# Patient Record
Sex: MALE | Race: WHITE | NOT HISPANIC OR LATINO | Employment: OTHER | URBAN - NONMETROPOLITAN AREA
[De-identification: names, ages, dates, MRNs, and addresses within clinical notes are randomized per-mention and may not be internally consistent; named-entity substitution may affect disease eponyms.]

---

## 2018-01-30 ENCOUNTER — OFFICE VISIT CONVERTED (OUTPATIENT)
Dept: FAMILY MEDICINE CLINIC | Age: 53
End: 2018-01-30
Attending: FAMILY MEDICINE

## 2018-02-28 ENCOUNTER — OFFICE VISIT CONVERTED (OUTPATIENT)
Dept: OTOLARYNGOLOGY | Facility: CLINIC | Age: 53
End: 2018-02-28
Attending: OTOLARYNGOLOGY

## 2018-04-27 ENCOUNTER — OFFICE VISIT CONVERTED (OUTPATIENT)
Dept: FAMILY MEDICINE CLINIC | Age: 53
End: 2018-04-27
Attending: FAMILY MEDICINE

## 2018-07-06 ENCOUNTER — OFFICE VISIT CONVERTED (OUTPATIENT)
Dept: FAMILY MEDICINE CLINIC | Age: 53
End: 2018-07-06
Attending: FAMILY MEDICINE

## 2018-10-02 ENCOUNTER — OFFICE VISIT CONVERTED (OUTPATIENT)
Dept: FAMILY MEDICINE CLINIC | Age: 53
End: 2018-10-02
Attending: FAMILY MEDICINE

## 2018-10-08 ENCOUNTER — OFFICE VISIT CONVERTED (OUTPATIENT)
Dept: FAMILY MEDICINE CLINIC | Age: 53
End: 2018-10-08
Attending: FAMILY MEDICINE

## 2018-10-29 ENCOUNTER — OFFICE VISIT CONVERTED (OUTPATIENT)
Dept: FAMILY MEDICINE CLINIC | Age: 53
End: 2018-10-29
Attending: NURSE PRACTITIONER

## 2018-12-31 ENCOUNTER — OFFICE VISIT CONVERTED (OUTPATIENT)
Dept: FAMILY MEDICINE CLINIC | Age: 53
End: 2018-12-31
Attending: NURSE PRACTITIONER

## 2019-01-09 ENCOUNTER — OFFICE VISIT CONVERTED (OUTPATIENT)
Dept: FAMILY MEDICINE CLINIC | Age: 54
End: 2019-01-09
Attending: FAMILY MEDICINE

## 2019-01-09 ENCOUNTER — HOSPITAL ENCOUNTER (OUTPATIENT)
Dept: OTHER | Facility: HOSPITAL | Age: 54
Discharge: HOME | End: 2019-01-09
Attending: FAMILY MEDICINE

## 2019-01-14 LAB
BACTERIA SPEC AEROBE CULT: ABNORMAL
CONV CEFTAZIDIME (BP): 0.25
CONV CEFTRIAXONE (BP): 2
CONV LEVOFLOXACIN (BP): 4
CONV MINOCYCLINE (BP): 3
CONV TICARCILLIN + CLAVULANATE (#) (BP): 0.5
CONV TRIMETHOPRIM + SULFAMETHOXAZOLE (#) (BP): >32

## 2019-02-14 ENCOUNTER — OFFICE VISIT CONVERTED (OUTPATIENT)
Dept: FAMILY MEDICINE CLINIC | Age: 54
End: 2019-02-14
Attending: FAMILY MEDICINE

## 2019-02-27 ENCOUNTER — OFFICE VISIT CONVERTED (OUTPATIENT)
Dept: FAMILY MEDICINE CLINIC | Age: 54
End: 2019-02-27
Attending: FAMILY MEDICINE

## 2019-03-28 ENCOUNTER — OFFICE VISIT CONVERTED (OUTPATIENT)
Dept: FAMILY MEDICINE CLINIC | Age: 54
End: 2019-03-28
Attending: FAMILY MEDICINE

## 2019-07-29 ENCOUNTER — OFFICE VISIT CONVERTED (OUTPATIENT)
Dept: FAMILY MEDICINE CLINIC | Age: 54
End: 2019-07-29
Attending: NURSE PRACTITIONER

## 2019-08-06 ENCOUNTER — OFFICE VISIT CONVERTED (OUTPATIENT)
Dept: FAMILY MEDICINE CLINIC | Age: 54
End: 2019-08-06
Attending: FAMILY MEDICINE

## 2019-08-07 ENCOUNTER — HOSPITAL ENCOUNTER (OUTPATIENT)
Dept: OTHER | Facility: HOSPITAL | Age: 54
Discharge: HOME | End: 2019-08-07
Attending: FAMILY MEDICINE

## 2019-08-11 LAB
CODEINE UR QL: <20 NG/ML
CONV 6-MAM (LCMSMS): <10 NG/ML
CONV OPIATES URINE NOROXYMORPHONE: <20 NG/ML
HYDROCODONE UR QL: 1037 NG/ML
HYDROMORPHONE UR QL: <20 NG/ML
MORPHINE UR QL: <20 NG/ML
OPIATES, URINE, NORHYDROCODONE: 196 NG/ML
OPIATES, URINE, NOROXYCODONE: <20 NG/ML
OXYCODONE UR: <20 NG/ML
OXYMORPHONE UR: <20 NG/ML

## 2019-08-12 ENCOUNTER — HOSPITAL ENCOUNTER (OUTPATIENT)
Dept: OTHER | Facility: HOSPITAL | Age: 54
Discharge: HOME | End: 2019-08-12
Attending: FAMILY MEDICINE

## 2019-08-17 LAB
BACTERIA SPEC AEROBE CULT: ABNORMAL
CONV AMIKACIN (KB): 33
CONV CEFEPIME (KB): 23
CONV CEFTAZIDIME (KB): 20
CONV CIPROFLOXACIN (KB): 6
CONV GENTAMICIN (KB): 36
CONV LEVOFLOXACIN SUSCEPTIBILITY BY DISK DIFFUSION (KB): 6
CONV PIPERACILLIN/TAZOBACTAM SUSCEPTIBILITY BY DISK DIFFUSION (KB): 29
CONV TOBRAMYCIN (KB): 35

## 2019-08-19 ENCOUNTER — HOSPITAL ENCOUNTER (OUTPATIENT)
Dept: OTHER | Facility: HOSPITAL | Age: 54
Discharge: HOME | End: 2019-08-19
Attending: FAMILY MEDICINE

## 2019-08-19 LAB
BASOPHILS # BLD MANUAL: 0.05 10*3/UL (ref 0–0.2)
BASOPHILS NFR BLD MANUAL: 0.6 % (ref 0–3)
DEPRECATED RDW RBC AUTO: 43.8 FL
EOSINOPHIL # BLD MANUAL: 0.16 10*3/UL (ref 0–0.7)
EOSINOPHIL NFR BLD MANUAL: 2 % (ref 0–7)
ERYTHROCYTE [DISTWIDTH] IN BLOOD BY AUTOMATED COUNT: 14.1 % (ref 11.5–14.5)
GRANS (ABSOLUTE): 4.78 10*3/UL (ref 2–8)
GRANS: 60 % (ref 30–85)
HBA1C MFR BLD: 10.3 G/DL (ref 14–18)
HCT VFR BLD AUTO: 32 % (ref 42–52)
IMM GRANULOCYTES # BLD: 0.01 10*3/UL (ref 0–0.54)
IMM GRANULOCYTES NFR BLD: 0.1 % (ref 0–0.43)
LYMPHOCYTES # BLD MANUAL: 2.39 10*3/UL (ref 1–5)
LYMPHOCYTES NFR BLD MANUAL: 7.3 % (ref 3–10)
MCH RBC QN AUTO: 27.1 PG (ref 27–31)
MCHC RBC AUTO-ENTMCNC: 32.2 G/DL (ref 33–37)
MCV RBC AUTO: 84.2 FL (ref 80–96)
MONOCYTES # BLD AUTO: 0.58 10*3/UL (ref 0.2–1.2)
PLATELET # BLD AUTO: 279 10*3/UL (ref 130–400)
PMV BLD AUTO: 9 FL (ref 7.4–10.4)
RBC # BLD AUTO: 3.8 10*6/UL (ref 4.7–6.1)
VARIANT LYMPHS NFR BLD MANUAL: 30 % (ref 20–45)
WBC # BLD AUTO: 7.97 10*3/UL (ref 4.8–10.8)

## 2019-09-03 ENCOUNTER — HOSPITAL ENCOUNTER (OUTPATIENT)
Dept: OTHER | Facility: HOSPITAL | Age: 54
Discharge: HOME | End: 2019-09-03
Attending: FAMILY MEDICINE

## 2019-09-03 ENCOUNTER — OFFICE VISIT CONVERTED (OUTPATIENT)
Dept: FAMILY MEDICINE CLINIC | Age: 54
End: 2019-09-03
Attending: FAMILY MEDICINE

## 2019-09-03 LAB
ANION GAP SERPL CALC-SCNC: 22 MMOL/L (ref 8–19)
BNP SERPL-MCNC: 186 PG/ML (ref 0–900)
BUN SERPL-MCNC: 17 MG/DL (ref 5–25)
BUN/CREAT SERPL: 15 {RATIO} (ref 6–20)
CALCIUM SERPL-MCNC: 9.1 MG/DL (ref 8.7–10.4)
CHLORIDE SERPL-SCNC: 94 MMOL/L (ref 99–111)
CONV CO2: 27 MMOL/L (ref 22–32)
CREAT UR-MCNC: 1.11 MG/DL (ref 0.7–1.2)
ERYTHROCYTE [DISTWIDTH] IN BLOOD BY AUTOMATED COUNT: 13.8 % (ref 11.5–14.5)
GFR SERPLBLD BASED ON 1.73 SQ M-ARVRAT: >60 ML/MIN/{1.73_M2}
GLUCOSE SERPL-MCNC: 251 MG/DL (ref 70–99)
HBA1C MFR BLD: 11.6 G/DL (ref 14–18)
HCT VFR BLD AUTO: 37.7 % (ref 42–52)
MCH RBC QN AUTO: 26.7 PG (ref 27–31)
MCHC RBC AUTO-ENTMCNC: 30.8 G/DL (ref 33–37)
MCV RBC AUTO: 86.9 FL (ref 80–96)
OSMOLALITY SERPL CALC.SUM OF ELEC: 296 MOSM/KG (ref 273–304)
PLATELET # BLD AUTO: 381 10*3/UL (ref 130–400)
PMV BLD AUTO: 8.6 FL (ref 7.4–10.4)
POTASSIUM SERPL-SCNC: 5.1 MMOL/L (ref 3.5–5.3)
RBC # BLD AUTO: 4.34 10*6/UL (ref 4.7–6.1)
SODIUM SERPL-SCNC: 138 MMOL/L (ref 135–147)
WBC # BLD AUTO: 8.92 10*3/UL (ref 4.8–10.8)

## 2019-09-05 LAB — BACTERIA SPEC AEROBE CULT: NORMAL

## 2019-09-18 ENCOUNTER — HOSPITAL ENCOUNTER (OUTPATIENT)
Dept: OTHER | Facility: HOSPITAL | Age: 54
Discharge: HOME | End: 2019-09-18
Attending: FAMILY MEDICINE

## 2019-09-18 ENCOUNTER — OFFICE VISIT CONVERTED (OUTPATIENT)
Dept: FAMILY MEDICINE CLINIC | Age: 54
End: 2019-09-18
Attending: FAMILY MEDICINE

## 2019-10-28 ENCOUNTER — OFFICE VISIT CONVERTED (OUTPATIENT)
Dept: PULMONOLOGY | Facility: CLINIC | Age: 54
End: 2019-10-28
Attending: INTERNAL MEDICINE

## 2019-11-06 ENCOUNTER — HOSPITAL ENCOUNTER (OUTPATIENT)
Dept: OTHER | Facility: HOSPITAL | Age: 54
Discharge: HOME | End: 2019-11-06
Attending: NURSE PRACTITIONER

## 2019-11-11 ENCOUNTER — OFFICE VISIT CONVERTED (OUTPATIENT)
Dept: FAMILY MEDICINE CLINIC | Age: 54
End: 2019-11-11
Attending: FAMILY MEDICINE

## 2019-11-11 ENCOUNTER — HOSPITAL ENCOUNTER (OUTPATIENT)
Dept: OTHER | Facility: HOSPITAL | Age: 54
Discharge: HOME | End: 2019-11-11
Attending: FAMILY MEDICINE

## 2019-11-11 LAB
ALBUMIN SERPL-MCNC: 3.7 G/DL (ref 3.5–5)
ALBUMIN/GLOB SERPL: 1 {RATIO} (ref 1.4–2.6)
ALP SERPL-CCNC: 136 U/L (ref 56–119)
ALT SERPL-CCNC: 12 U/L (ref 10–40)
ANION GAP SERPL CALC-SCNC: 20 MMOL/L (ref 8–19)
AST SERPL-CCNC: 18 U/L (ref 15–50)
BILIRUB SERPL-MCNC: 0.21 MG/DL (ref 0.2–1.3)
BUN SERPL-MCNC: 28 MG/DL (ref 5–25)
BUN/CREAT SERPL: 18 {RATIO} (ref 6–20)
CALCIUM SERPL-MCNC: 9.4 MG/DL (ref 8.7–10.4)
CHLORIDE SERPL-SCNC: 93 MMOL/L (ref 99–111)
CHOLEST SERPL-MCNC: 129 MG/DL (ref 107–200)
CHOLEST/HDLC SERPL: 3.5 {RATIO} (ref 3–6)
CONV CO2: 28 MMOL/L (ref 22–32)
CONV CREATININE URINE, RANDOM: 140.7 MG/DL (ref 10–300)
CONV MICROALBUM.,U,RANDOM: 926 MG/L (ref 0–20)
CONV TOTAL PROTEIN: 7.3 G/DL (ref 6.3–8.2)
CREAT UR-MCNC: 1.53 MG/DL (ref 0.7–1.2)
EST. AVERAGE GLUCOSE BLD GHB EST-MCNC: 243 MG/DL
GFR SERPLBLD BASED ON 1.73 SQ M-ARVRAT: 51 ML/MIN/{1.73_M2}
GLOBULIN UR ELPH-MCNC: 3.6 G/DL (ref 2–3.5)
GLUCOSE SERPL-MCNC: 306 MG/DL (ref 70–99)
HBA1C MFR BLD: 10.1 % (ref 3.5–5.7)
HDLC SERPL-MCNC: 37 MG/DL (ref 40–60)
LDLC SERPL CALC-MCNC: 58 MG/DL (ref 70–100)
MICROALBUMIN/CREAT UR: 658.1 MG/G{CRE} (ref 0–25)
OSMOLALITY SERPL CALC.SUM OF ELEC: 299 MOSM/KG (ref 273–304)
POTASSIUM SERPL-SCNC: 5.2 MMOL/L (ref 3.5–5.3)
SODIUM SERPL-SCNC: 136 MMOL/L (ref 135–147)
TRIGL SERPL-MCNC: 172 MG/DL (ref 40–150)
VLDLC SERPL-MCNC: 34 MG/DL (ref 5–37)

## 2019-11-14 ENCOUNTER — HOSPITAL ENCOUNTER (OUTPATIENT)
Dept: OTHER | Facility: HOSPITAL | Age: 54
Discharge: HOME | End: 2019-11-14
Attending: FAMILY MEDICINE

## 2019-12-05 ENCOUNTER — OFFICE VISIT CONVERTED (OUTPATIENT)
Dept: FAMILY MEDICINE CLINIC | Age: 54
End: 2019-12-05
Attending: NURSE PRACTITIONER

## 2019-12-05 ENCOUNTER — HOSPITAL ENCOUNTER (OUTPATIENT)
Dept: CARDIOLOGY | Facility: HOSPITAL | Age: 54
Discharge: HOME | End: 2019-12-05
Attending: NURSE PRACTITIONER

## 2019-12-07 LAB — BACTERIA SPEC AEROBE CULT: NORMAL

## 2019-12-16 ENCOUNTER — HOSPITAL ENCOUNTER (OUTPATIENT)
Dept: OTHER | Facility: HOSPITAL | Age: 54
Discharge: HOME | End: 2019-12-16
Attending: NURSE PRACTITIONER

## 2019-12-16 ENCOUNTER — OFFICE VISIT CONVERTED (OUTPATIENT)
Dept: FAMILY MEDICINE CLINIC | Age: 54
End: 2019-12-16
Attending: NURSE PRACTITIONER

## 2019-12-20 LAB
BACTERIA SPEC AEROBE CULT: ABNORMAL
CEFEPIME SUSC ISLT: <=1
CONV AMIKACIN (KB): ABNORMAL
CONV CEFTAZIDIME (KB): ABNORMAL
CONV CIPROFLOXACIN (KB): ABNORMAL
CONV GENTAMICIN (KB): ABNORMAL
CONV LEVOFLOXACIN SUSCEPTIBILITY BY DISK DIFFUSION (KB): ABNORMAL
CONV PIPERACILLIN/TAZOBACTAM SUSCEPTIBILITY BY DISK DIFFUSION (KB): ABNORMAL
Lab: ABNORMAL
Lab: ABNORMAL

## 2020-01-03 ENCOUNTER — OFFICE VISIT CONVERTED (OUTPATIENT)
Dept: FAMILY MEDICINE CLINIC | Age: 55
End: 2020-01-03
Attending: NURSE PRACTITIONER

## 2020-01-27 ENCOUNTER — OFFICE VISIT CONVERTED (OUTPATIENT)
Dept: FAMILY MEDICINE CLINIC | Age: 55
End: 2020-01-27
Attending: FAMILY MEDICINE

## 2020-02-11 ENCOUNTER — OFFICE VISIT CONVERTED (OUTPATIENT)
Dept: FAMILY MEDICINE CLINIC | Age: 55
End: 2020-02-11
Attending: FAMILY MEDICINE

## 2020-02-28 ENCOUNTER — OFFICE VISIT CONVERTED (OUTPATIENT)
Dept: FAMILY MEDICINE CLINIC | Age: 55
End: 2020-02-28
Attending: FAMILY MEDICINE

## 2020-02-28 ENCOUNTER — HOSPITAL ENCOUNTER (OUTPATIENT)
Dept: OTHER | Facility: HOSPITAL | Age: 55
Discharge: HOME | End: 2020-02-28
Attending: FAMILY MEDICINE

## 2020-03-11 ENCOUNTER — OFFICE VISIT CONVERTED (OUTPATIENT)
Dept: FAMILY MEDICINE CLINIC | Age: 55
End: 2020-03-11
Attending: FAMILY MEDICINE

## 2020-03-17 ENCOUNTER — OFFICE VISIT CONVERTED (OUTPATIENT)
Dept: FAMILY MEDICINE CLINIC | Age: 55
End: 2020-03-17
Attending: FAMILY MEDICINE

## 2020-03-17 ENCOUNTER — HOSPITAL ENCOUNTER (OUTPATIENT)
Dept: OTHER | Facility: HOSPITAL | Age: 55
Discharge: HOME | End: 2020-03-17
Attending: FAMILY MEDICINE

## 2020-03-17 LAB
ALBUMIN SERPL-MCNC: 3.3 G/DL (ref 3.5–5)
ALBUMIN/GLOB SERPL: 1 {RATIO} (ref 1.4–2.6)
ALP SERPL-CCNC: 119 U/L (ref 56–119)
ALT SERPL-CCNC: 18 U/L (ref 10–40)
ANION GAP SERPL CALC-SCNC: 21 MMOL/L (ref 8–19)
AST SERPL-CCNC: 24 U/L (ref 15–50)
BILIRUB SERPL-MCNC: <0.15 MG/DL (ref 0.2–1.3)
BUN SERPL-MCNC: 15 MG/DL (ref 5–25)
BUN/CREAT SERPL: 14 {RATIO} (ref 6–20)
CALCIUM SERPL-MCNC: 8.5 MG/DL (ref 8.7–10.4)
CHLORIDE SERPL-SCNC: 92 MMOL/L (ref 99–111)
CONV CO2: 27 MMOL/L (ref 22–32)
CONV TOTAL PROTEIN: 6.6 G/DL (ref 6.3–8.2)
CREAT UR-MCNC: 1.07 MG/DL (ref 0.7–1.2)
ERYTHROCYTE [DISTWIDTH] IN BLOOD BY AUTOMATED COUNT: 13.4 % (ref 11.5–14.5)
GFR SERPLBLD BASED ON 1.73 SQ M-ARVRAT: >60 ML/MIN/{1.73_M2}
GLOBULIN UR ELPH-MCNC: 3.3 G/DL (ref 2–3.5)
GLUCOSE SERPL-MCNC: 410 MG/DL (ref 70–99)
HBA1C MFR BLD: 10.1 G/DL (ref 14–18)
HCT VFR BLD AUTO: 32 % (ref 42–52)
MAGNESIUM SERPL-MCNC: 0.78 MG/DL (ref 1.6–2.3)
MCH RBC QN AUTO: 26.7 PG (ref 27–31)
MCHC RBC AUTO-ENTMCNC: 31.6 G/DL (ref 33–37)
MCV RBC AUTO: 84.7 FL (ref 80–96)
OSMOLALITY SERPL CALC.SUM OF ELEC: 298 MOSM/KG (ref 273–304)
PLATELET # BLD AUTO: 374 10*3/UL (ref 130–400)
PMV BLD AUTO: 9.3 FL (ref 7.4–10.4)
POTASSIUM SERPL-SCNC: 4.9 MMOL/L (ref 3.5–5.3)
RBC # BLD AUTO: 3.78 10*6/UL (ref 4.7–6.1)
SODIUM SERPL-SCNC: 135 MMOL/L (ref 135–147)
TSH SERPL-ACNC: 0.58 M[IU]/L (ref 0.27–4.2)
WBC # BLD AUTO: 10.12 10*3/UL (ref 4.8–10.8)

## 2020-03-18 ENCOUNTER — CONVERSION ENCOUNTER (OUTPATIENT)
Dept: CARDIOLOGY | Facility: CLINIC | Age: 55
End: 2020-03-18
Attending: INTERNAL MEDICINE

## 2020-03-18 LAB
FERRITIN SERPL-MCNC: 87 NG/ML (ref 30–300)
FOLATE SERPL-MCNC: 15.7 NG/ML (ref 4.8–20)
IRON SATN MFR SERPL: 13 % (ref 20–55)
IRON SERPL-MCNC: 34 UG/DL (ref 70–180)
TIBC SERPL-MCNC: 256 UG/DL (ref 245–450)
TRANSFERRIN SERPL-MCNC: 179 MG/DL (ref 215–365)
VIT B12 SERPL-MCNC: 307 PG/ML (ref 211–911)

## 2020-03-31 ENCOUNTER — OFFICE VISIT CONVERTED (OUTPATIENT)
Dept: FAMILY MEDICINE CLINIC | Age: 55
End: 2020-03-31
Attending: FAMILY MEDICINE

## 2020-04-09 ENCOUNTER — OFFICE VISIT CONVERTED (OUTPATIENT)
Dept: FAMILY MEDICINE CLINIC | Age: 55
End: 2020-04-09
Attending: FAMILY MEDICINE

## 2020-04-09 ENCOUNTER — HOSPITAL ENCOUNTER (OUTPATIENT)
Dept: OTHER | Facility: HOSPITAL | Age: 55
Discharge: HOME | End: 2020-04-09
Attending: FAMILY MEDICINE

## 2020-04-09 LAB — MAGNESIUM SERPL-MCNC: 1.74 MG/DL (ref 1.6–2.3)

## 2020-04-13 ENCOUNTER — OFFICE VISIT CONVERTED (OUTPATIENT)
Dept: FAMILY MEDICINE CLINIC | Age: 55
End: 2020-04-13
Attending: FAMILY MEDICINE

## 2020-05-20 ENCOUNTER — OFFICE VISIT CONVERTED (OUTPATIENT)
Dept: FAMILY MEDICINE CLINIC | Age: 55
End: 2020-05-20
Attending: FAMILY MEDICINE

## 2020-05-21 ENCOUNTER — HOSPITAL ENCOUNTER (OUTPATIENT)
Dept: OTHER | Facility: HOSPITAL | Age: 55
Discharge: HOME | End: 2020-05-21
Attending: FAMILY MEDICINE

## 2020-06-17 ENCOUNTER — HOSPITAL ENCOUNTER (OUTPATIENT)
Dept: OTHER | Facility: HOSPITAL | Age: 55
Discharge: HOME | End: 2020-06-17
Attending: FAMILY MEDICINE

## 2020-06-17 ENCOUNTER — OFFICE VISIT CONVERTED (OUTPATIENT)
Dept: FAMILY MEDICINE CLINIC | Age: 55
End: 2020-06-17
Attending: FAMILY MEDICINE

## 2020-06-17 LAB
ERYTHROCYTE [DISTWIDTH] IN BLOOD BY AUTOMATED COUNT: 13.2 % (ref 11.5–14.5)
EST. AVERAGE GLUCOSE BLD GHB EST-MCNC: 329 MG/DL
HBA1C MFR BLD: 10.6 G/DL (ref 14–18)
HBA1C MFR BLD: 13.1 % (ref 3.5–5.7)
HCT VFR BLD AUTO: 33.2 % (ref 42–52)
IRON SERPL-MCNC: 76 UG/DL (ref 70–180)
MCH RBC QN AUTO: 27.5 PG (ref 27–31)
MCHC RBC AUTO-ENTMCNC: 31.9 G/DL (ref 33–37)
MCV RBC AUTO: 86.2 FL (ref 80–96)
PLATELET # BLD AUTO: 327 10*3/UL (ref 130–400)
PMV BLD AUTO: 8.5 FL (ref 7.4–10.4)
RBC # BLD AUTO: 3.85 10*6/UL (ref 4.7–6.1)
VIT B12 SERPL-MCNC: 263 PG/ML (ref 211–911)
WBC # BLD AUTO: 9.61 10*3/UL (ref 4.8–10.8)

## 2020-07-22 ENCOUNTER — OFFICE VISIT CONVERTED (OUTPATIENT)
Dept: FAMILY MEDICINE CLINIC | Age: 55
End: 2020-07-22
Attending: FAMILY MEDICINE

## 2020-07-23 ENCOUNTER — HOSPITAL ENCOUNTER (OUTPATIENT)
Dept: OTHER | Facility: HOSPITAL | Age: 55
Discharge: HOME | End: 2020-07-23
Attending: FAMILY MEDICINE

## 2020-07-26 LAB — SARS-COV-2 RNA SPEC QL NAA+PROBE: NOT DETECTED

## 2020-09-08 ENCOUNTER — OFFICE VISIT CONVERTED (OUTPATIENT)
Dept: FAMILY MEDICINE CLINIC | Age: 55
End: 2020-09-08
Attending: FAMILY MEDICINE

## 2020-09-21 ENCOUNTER — OFFICE VISIT CONVERTED (OUTPATIENT)
Dept: FAMILY MEDICINE CLINIC | Age: 55
End: 2020-09-21
Attending: FAMILY MEDICINE

## 2020-09-28 ENCOUNTER — HOSPITAL ENCOUNTER (OUTPATIENT)
Dept: OTHER | Facility: HOSPITAL | Age: 55
Discharge: HOME | End: 2020-09-28
Attending: FAMILY MEDICINE

## 2020-09-28 LAB
ANION GAP SERPL CALC-SCNC: 18 MMOL/L (ref 8–19)
BASOPHILS # BLD AUTO: 0.09 10*3/UL (ref 0–0.2)
BASOPHILS NFR BLD AUTO: 0.7 % (ref 0–3)
BNP SERPL-MCNC: 656 PG/ML (ref 0–900)
BUN SERPL-MCNC: 9 MG/DL (ref 5–25)
BUN/CREAT SERPL: 8 {RATIO} (ref 6–20)
CALCIUM SERPL-MCNC: 9.4 MG/DL (ref 8.7–10.4)
CHLORIDE SERPL-SCNC: 93 MMOL/L (ref 99–111)
CONV ABS IMM GRAN: 0.05 10*3/UL (ref 0–0.2)
CONV CO2: 28 MMOL/L (ref 22–32)
CONV IMMATURE GRAN: 0.4 % (ref 0–1.8)
CREAT UR-MCNC: 1.13 MG/DL (ref 0.7–1.2)
DEPRECATED RDW RBC AUTO: 40.2 FL (ref 35.1–43.9)
EOSINOPHIL # BLD AUTO: 0.26 10*3/UL (ref 0–0.7)
EOSINOPHIL # BLD AUTO: 2.1 % (ref 0–7)
ERYTHROCYTE [DISTWIDTH] IN BLOOD BY AUTOMATED COUNT: 13.1 % (ref 11.6–14.4)
GFR SERPLBLD BASED ON 1.73 SQ M-ARVRAT: >60 ML/MIN/{1.73_M2}
GLUCOSE SERPL-MCNC: 272 MG/DL (ref 70–99)
HCT VFR BLD AUTO: 41.1 % (ref 42–52)
HGB BLD-MCNC: 13.1 G/DL (ref 14–18)
LYMPHOCYTES # BLD AUTO: 2.43 10*3/UL (ref 1–5)
LYMPHOCYTES NFR BLD AUTO: 20.1 % (ref 20–45)
MCH RBC QN AUTO: 27.1 PG (ref 27–31)
MCHC RBC AUTO-ENTMCNC: 31.9 G/DL (ref 33–37)
MCV RBC AUTO: 85.1 FL (ref 80–96)
MONOCYTES # BLD AUTO: 0.9 10*3/UL (ref 0.2–1.2)
MONOCYTES NFR BLD AUTO: 7.4 % (ref 3–10)
NEUTROPHILS # BLD AUTO: 8.38 10*3/UL (ref 2–8)
NEUTROPHILS NFR BLD AUTO: 69.3 % (ref 30–85)
NRBC CBCN: 0 % (ref 0–0.7)
OSMOLALITY SERPL CALC.SUM OF ELEC: 288 MOSM/KG (ref 273–304)
PLATELET # BLD AUTO: 423 10*3/UL (ref 130–400)
PMV BLD AUTO: 9.7 FL (ref 9.4–12.4)
POTASSIUM SERPL-SCNC: 3.8 MMOL/L (ref 3.5–5.3)
RBC # BLD AUTO: 4.83 10*6/UL (ref 4.7–6.1)
SODIUM SERPL-SCNC: 135 MMOL/L (ref 135–147)
WBC # BLD AUTO: 12.11 10*3/UL (ref 4.8–10.8)

## 2020-09-29 ENCOUNTER — HOSPITAL ENCOUNTER (OUTPATIENT)
Dept: OTHER | Facility: HOSPITAL | Age: 55
Discharge: HOME | End: 2020-09-29
Attending: FAMILY MEDICINE

## 2020-09-29 LAB — D DIMER PPP FEU-MCNC: 3.07 MG/L (ref 0–0.59)

## 2020-10-13 ENCOUNTER — OFFICE VISIT CONVERTED (OUTPATIENT)
Dept: FAMILY MEDICINE CLINIC | Age: 55
End: 2020-10-13
Attending: FAMILY MEDICINE

## 2020-10-13 ENCOUNTER — HOSPITAL ENCOUNTER (OUTPATIENT)
Dept: OTHER | Facility: HOSPITAL | Age: 55
Discharge: HOME | End: 2020-10-13
Attending: FAMILY MEDICINE

## 2020-10-13 LAB
ERYTHROCYTE [DISTWIDTH] IN BLOOD BY AUTOMATED COUNT: 12.7 % (ref 11.5–14.5)
HBA1C MFR BLD: 11.3 G/DL (ref 14–18)
HCT VFR BLD AUTO: 33.5 % (ref 42–52)
MCH RBC QN AUTO: 27.8 PG (ref 27–31)
MCHC RBC AUTO-ENTMCNC: 33.7 G/DL (ref 33–37)
MCV RBC AUTO: 82.3 FL (ref 80–96)
PLATELET # BLD AUTO: 452 10*3/UL (ref 130–400)
PMV BLD AUTO: 9.2 FL (ref 7.4–10.4)
RBC # BLD AUTO: 4.07 10*6/UL (ref 4.7–6.1)
WBC # BLD AUTO: 10.79 10*3/UL (ref 4.8–10.8)

## 2020-10-16 LAB
BACTERIA SPEC AEROBE CULT: ABNORMAL
CONV AMIKACIN (KB): ABNORMAL
CONV CEFEPIME (KB): ABNORMAL
CONV CEFTAZIDIME (KB): ABNORMAL
CONV CIPROFLOXACIN (KB): ABNORMAL
CONV GENTAMICIN (KB): ABNORMAL
CONV LEVOFLOXACIN SUSCEPTIBILITY BY DISK DIFFUSION (KB): ABNORMAL
CONV PIPERACILLIN/TAZOBACTAM SUSCEPTIBILITY BY DISK DIFFUSION (KB): ABNORMAL
CONV TOBRAMYCIN (KB): ABNORMAL

## 2020-10-17 LAB — SARS-COV-2 RNA SPEC QL NAA+PROBE: NOT DETECTED

## 2020-10-27 ENCOUNTER — OFFICE VISIT CONVERTED (OUTPATIENT)
Dept: PULMONOLOGY | Facility: CLINIC | Age: 55
End: 2020-10-27
Attending: INTERNAL MEDICINE

## 2020-12-01 ENCOUNTER — OFFICE VISIT CONVERTED (OUTPATIENT)
Dept: FAMILY MEDICINE CLINIC | Age: 55
End: 2020-12-01
Attending: FAMILY MEDICINE

## 2020-12-01 ENCOUNTER — HOSPITAL ENCOUNTER (OUTPATIENT)
Dept: OTHER | Facility: HOSPITAL | Age: 55
Discharge: HOME | End: 2020-12-01
Attending: FAMILY MEDICINE

## 2020-12-14 ENCOUNTER — OFFICE VISIT CONVERTED (OUTPATIENT)
Dept: PULMONOLOGY | Facility: CLINIC | Age: 55
End: 2020-12-14
Attending: NURSE PRACTITIONER

## 2020-12-23 ENCOUNTER — OFFICE VISIT CONVERTED (OUTPATIENT)
Dept: FAMILY MEDICINE CLINIC | Age: 55
End: 2020-12-23
Attending: FAMILY MEDICINE

## 2021-01-07 ENCOUNTER — OFFICE VISIT CONVERTED (OUTPATIENT)
Dept: FAMILY MEDICINE CLINIC | Age: 56
End: 2021-01-07
Attending: FAMILY MEDICINE

## 2021-05-16 VITALS — HEIGHT: 69 IN | TEMPERATURE: 97.4 F | BODY MASS INDEX: 46.65 KG/M2 | WEIGHT: 315 LBS

## 2021-05-18 NOTE — PROGRESS NOTES
Preston Wallis 1965     Office/Outpatient Visit    Visit Date: Tue, Jan 30, 2018 09:51 am    Provider: Kimmy Riley MD (Assistant: Yessi Stockton MA)    Location: [facility]        Electronically signed by Kimmy Riley MD on  01/31/2018 03:14:53 PM                             SUBJECTIVE:        CC: chronic back pain follow up-3 month face to face         HPI:     Gómez is following up for his pain med refills, this is his 3 month face to face.  He suffers from chronic LBP and PN pain with R foot pain due to chronic foot fracture.  His pain remains chronic and daily and the  hydrocodone 10/325 tid prn which allows him to be functional.  He tolerates meds well and needs refills today.   No known concern about abuse or diversion.           Additionally, he presents with history of hypertension.  his current cardiac medication regimen includes an ACE inhibitor ( Zestril ).  He is tolerating the medication well without side effects.  Compliance with treatment has been good; he takes his medication as directed and follows up as directed.      ongoing tinnitus, and feeling dizzy and lightheaded, doesnt think he has any hearing loss-his son was dx with menieres disease and he is wanting to be treated today, also     he is bruising easily on his UE     ROS:     CONSTITUTIONAL:  Negative for chills, fatigue, fever and weight change.      E/N/T:  Negative for nasal congestion and frequent rhinorrhea.      CARDIOVASCULAR:  Positive for pedal edema.   Negative for chest pain, orthopnea or paroxysmal nocturnal dyspnea.      RESPIRATORY:  Positive for chronic cough and dyspnea.      GASTROINTESTINAL:  Negative for abdominal pain, heartburn, constipation, diarrhea, and stool changes.      INTEGUMENTARY:  Positive for left great toe ulcer.          PMH/FMH/SH:     Last Reviewed on 1/30/2018 10:20 AM by Kimmy Riley    Past Medical History:     Use of high risk medications     MRSA pneumonia     Chronic  low back pain     Closed fracture of other tarsal and metatarsal bones     Eczema     Low back pain     Type 2 diabetes     Chronic bronchitis, mucopurulent     Diabetes with neurological manifestations, type II or unspecified type, not stated as uncontrolled     Allergies     Bronchiectasis     COPD     Hypercholesterolemia     Type II DM     Hypertension     GERD     Peripheral neuropathy attributed to type II diabetes     Erectile dysfunction due to organic reasons             Surgical History:         Tonsillectomy/Adenoidectomy      L knee;    venous port - L chest;         Family History:         Positive for Coronary Artery Disease ( mother ) and Hypertension ( mother ).      Positive for Cancer- type not specified ( sister ).      Positive for Asthma ( father ).      Positive for Type 2 Diabetes ( mother ).          Social History:     Occupation: Disabled (due to COPD)     Marital Status:      Children: 2 children         Tobacco/Alcohol/Supplements:     Last Reviewed on 1/30/2018 10:20 AM by Kimmy Riley    Tobacco: He has a past history of cigarette smoking; quit date:  1993.  Non-drinker         Substance Abuse History:     Last Reviewed on 5/22/2013 03:43 PM by Jorgito Ames            Allergies:     Last Reviewed on 12/15/2017 04:33 PM by Yessi Stockton    Proventil: candidiasis (Adverse Reaction)        Current Medications:     Last Reviewed on 12/15/2017 04:35 PM by Yessi Stockton    Bydureon 2mg/1pen Injection Suspension, Extended-Release Inject 2 mg subcutaneously q week     Amitriptyline HCl 25mg Tablet Take 1 tablet(s) by mouth at bedtime     Loratadine 10mg Tablet Take 1 tablet(s) by mouth daily     Trazodone HCl 100mg Tablet 1 tab HS     Omeprazole 40mg Capsules, Extended Release Take 1 capsule(s) by mouth daily     Hydrocodone/Acetaminophen 10mg/325mg Tablet 1 tab every 8 hours prn     Symbicort 160mcg/4.5mcg Oral Inhaler 1 puff BID     Lisinopril 10mg Tablet  "1/2 tab daily     Metformin HCl 500mg Tablets, Extended Release 2 po bid     Cardizem CD  120mg Capsules, Extended Release Take 1 capsule(s) by mouth daily     Breo Ellipta 200mcg/25mcg Inhalation Powder Take 1 inhalation(s) by mouth daily     Albuterol 0.083% Nebulizer Solution 1 vial(s) by nebulizer qid as directed     Levemir 100units/1ml Injection 70 units bid     Hydrochlorothiazide (HCTZ) 12.5mg Tablet 1 po daily     Montelukast Sodium 10mg Tablet 1 tab daily     Ventolin HFA 90mcg/1actuation Oral Inhaler Inhale 2 puff(s) by mouth 4 times a day as needed     Insulin Syringes 1ml Syringe Use as directed     Mucinex 600mg Tablets, Extended Release Take 2 tablet(s) by mouth q12h     BD Ultra-Fine Mini Pen Needle 31G x 3/16\"  Pen Needle Use TID with insulin.     Aspirin (ASA) 81mg Tablets, Enteric Coated 1 tab daily     Actos 45mg Tablet Take 1 tablet(s) by mouth daily     Atorvastatin Calcium 20mg Tablet 1 po q hs         OBJECTIVE:        Vitals:         Current: 1/30/2018 9:56:38 AM    Ht:  5 ft, 9 in;  Wt: 315.6 lbs;  BMI: 46.6    T: 97 F (oral);  BP: 129/68 mm Hg (left arm, sitting);  P: 101 bpm (left arm (BP Cuff), standing);  sCr: 1.08 mg/dL;  GFR: 97.17        Exams:     PHYSICAL EXAM:     GENERAL: Vitals recorded well developed, well nourished;  well groomed;  no apparent distress;     EYES: PERRL, EOMI     E/N/T: EARS:  normal external auditory canals and tympanic membranes;  grossly normal hearing; OROPHARYNX:  normal mucosa, dentition, gingiva, and posterior pharynx;     NECK:  supple, full ROM; no thyromegaly; no carotid bruits;     RESPIRATORY: normal respiratory rate and pattern with no distress; normal breath sounds with no rales, rhonchi, wheezes or rubs;     CARDIOVASCULAR: normal rate; rhythm is regular;  normal S1; normal S2; no systolic murmur; no cyanosis; no edema;     MUSCULOSKELETAL: gait: affected by a limp and unsteady;     SKIN-sores on L forearm, with small bruises on R forearm     " NEUROLOGICAL:  cranial nerves, motor and sensory function, reflexes, gait and coordination are all intact;     PSYCHIATRIC:  appropriate affect and demeanor; normal speech pattern; grossly normal memory;         Lab/Test Results:             Amphetamines Screen, Urin:  Negative (01/30/2018),     BAR-Barbiturates Screen, Urin:  Negative (01/30/2018),     Buprenorphine:  Negative (01/30/2018),     BZO-Benzodiazepines Screen,Ur:  Negative (01/30/2018),     Cocaine(Metab.)Screen, Ur:  Negative (01/30/2018),     MDMA-Ecstasy:  Negative (01/30/2018),     Met-Methamphetamine:  Negative (01/30/2018),     MTD-Methadone Screen, Urine:  Negative (01/30/2018),     Opiate Screen, Urine:  Positive (01/30/2018),     OXY-Oxycodone:  Negative (01/30/2018),     PCP-Phencyclidine Screen, Uri:  Negative (01/30/2018),     THC Cannabinoids Screen, Urin:  Negative (01/30/2018),     Urine temperature:  confirmed (01/30/2018),     Date and time of last pill:  Norco 1-30-18 at 7:30 am (01/30/2018),     Performed by:  pr (01/30/2018),     Collection Time:  10:12 (01/30/2018),             ASSESSMENT           V58.69   Z79.899  Use of high risk medications              DDx:     724.2   M54.5  Chronic low back pain              DDx:     401.1   I10  Hypertension              DDx:     250.60   E11.40  Peripheral neuropathy attributed to type II diabetes              DDx:     388.31   H93.13  Tinnitus              DDx:     782.7   R23.3  Easy bruising              DDx:     780.57   G47.33  Obstructive sleep apnea              DDx:     250.00   E11.8  Type II DM              DDx:         ORDERS:         Meds Prescribed:       Refill of: Hydrocodone/Acetaminophen 10mg/325mg Tablet 1 tab every 8 hours prn  #70 (Seventy) tablet(s) Refills: 0       Refill of: Bydureon (Exenatide) 2mg/1pen Injection Suspension, Extended-Release Inject 2 mg subcutaneously q week  #4 (Four) each Refills: 5         Lab Orders:       [id number]  Drug test prsmv qual dir optical  obs per day  (In-House)         [address]16  [address] CBC w/o diff  (Send-Out)           Procedures Ordered:       REFER  Referral to Specialist or Other Facility  (Send-Out)                   PLAN:          Use of high risk medications christine reviewed, drug screen performed and appropriate, consent is reviewed and signed and on the chart, pt is aware of risk of addiction on this medication and understands that he will need to follow up for a review every 3 months and his medications will be adjusted or decreased as deemed appropriate at each visit.  No personal history of drug or alcohol abuse.  No concerns about diversion or abuse.  He denies side effects related to the medication.  He is  aware that he may be called in for pill counts.     Drug screen           Orders:       [id number]  Drug test prsmv qual dir optical obs per day  (In-House)            Chronic low back pain stable on meds           Prescriptions:       Refill of: Hydrocodone/Acetaminophen 10mg/325mg Tablet 1 tab every 8 hours prn  #70 (Seventy) tablet(s) Refills: 0          Hypertension stable          Peripheral neuropathy attributed to type II diabetes cont pain meds          Tinnitus will refer to ENT for further work up         REFERRALS:  Referral initiated to an E/N/T ( [facility] ).            Orders:       REFER  Referral to Specialist or Other Facility  (Send-Out)            Easy bruising     LABORATORY:  Labs ordered to be performed today include CBC W/O DIFF.            Orders:       [id number]  [address] CBC w/o diff  (Send-Out)            Obstructive sleep apnea seeing Dr Singh-getting testing done  now          Type II DM needs bydureon refilled, he is well controlled, due for labs in 3 months           Prescriptions:       Refill of: Bydureon (Exenatide) 2mg/1pen Injection Suspension, Extended-Release Inject 2 mg subcutaneously q week  #4 (Four) each Refills: 5             CHARGE CAPTURE           **Please note: ICD descriptions below are  intended for billing purposes only and may not represent clinical diagnoses**        Primary Diagnosis:         V58.69 Use of high risk medications            Z79.899    Other long term (current) drug therapy              Orders:          82123   Office/outpatient visit; established patient, level 4  (In-House)             03056   Drug test prsmv qual dir optical obs per day  (In-House)           724.2 Chronic low back pain            M54.5    Low back pain    401.1 Hypertension            I10    Essential (primary) hypertension    250.60 Peripheral neuropathy attributed to type II diabetes            E11.40    Type 2 diabetes mellitus with diabetic neuropathy, unspecified    388.31 Tinnitus            H93.13    Tinnitus, bilateral    782.7 Easy bruising            R23.3    Spontaneous ecchymoses    780.57 Obstructive sleep apnea            G47.33    Obstructive sleep apnea (adult) (pediatric)    250.00 Type II DM            E11.8    Type 2 diabetes mellitus with unspecified complications        ADDENDUMS:      ____________________________________    Date: 01/31/2018 09:27 AM    Author: Randee Spears         Visit Note Faxed to:        James Sandoval  (Otolaryngology); Number [phone]     Health Summary Faxed to:        James Sandoval  (Otolaryngology); Number [phone]            Date: 04/10/2018 02:10 PM    Author: Randee Spears         Visit Note Faxed to:        Abad Gonzalez  (Surgery, Urological); Number [phone]

## 2021-05-18 NOTE — PROGRESS NOTES
Preston Wallis 1965     Office/Outpatient Visit    Visit Date: Fri, Jul 6, 2018 10:44 am    Provider: Kimmy Riley MD (Assistant: Marion Melendez MA)    Location: [facility]        Electronically signed by Kimmy Riley MD on  07/10/2018 03:50:46 PM                             SUBJECTIVE:        CC: f/u for chronic LBP         HPI:     Gómez is in today for his face to face for his chronic pain med refills, he has chronic LBP and PN pain with R foot pain due to chronic foot fracture.  His pain is moderate, chronic and daily.  He is functional on his meds, he takes   hydrocodone 10/325 tid prn (most days bid dosing).  He tolerates meds well and needs refills today.   No known concern about abuse or diversion.           Additionally, he presents with history of type II DM.  specifically, this is type 1 diabetes, complicated by peripheral neuropathy.  Compliance with treatment has been good; he takes his medication as directed and is keeping a glucose diary.  He denies experiencing any diabetes related symptoms.  Depression screen is performed and is negative.      Tobacco screen: Non-smoker.  Current meds include an oral hypoglycemic ( Actos, Glucotrol, and bydureon ), aspirin, and a lipid lowering agent.  He does not perform home blood glucose monitoring.  Most recent lab results include TSH:  1.360 (mIU/L) (07/12/2017), Total Cholesterol:  126 (mg/dL) (05/23/2018), HDL:  39 (mg/dL) (05/23/2018), Triglycerides:  162 (mg/dL) (05/23/2018), LDL:  55 (mg/dL) (05/23/2018), Creatinine, Urine:  71.4 (mg/dL) (05/23/2018), Microalbumin, Urine, rand:  399.7 (mg/L) (05/23/2018), Microalbumin/Creat Ratio:  559.8 (mg/g creat) (05/23/2018), A/G Ratio:  0.9 (RATIO) (05/23/2018), Hemoglobin A1c:  8.4 (%) (05/23/2018).  Has not fallen recently In regard to preventative care, his last ophthalmology exam was in 10/2017.      ROS:     CONSTITUTIONAL:  Negative for chills, fatigue, fever and weight change.       E/N/T:  Negative for nasal congestion and frequent rhinorrhea.      CARDIOVASCULAR:  Positive for pedal edema.   Negative for chest pain, orthopnea or paroxysmal nocturnal dyspnea.      RESPIRATORY:  Positive for chronic cough and dyspnea.      GASTROINTESTINAL:  Negative for abdominal pain, heartburn, constipation, diarrhea, and stool changes.      INTEGUMENTARY:  Positive for left great toe ulcer.      NEUROLOGICAL:  Positive for dizziness ( with standing ).   Negative for headaches or weakness.          PMH/FMH/SH:     Last Reviewed on 7/06/2018 11:06 AM by Kimmy Riley    Past Medical History:     Use of high risk medications     MRSA pneumonia     Chronic low back pain     Closed fracture of other tarsal and metatarsal bones     Eczema     Low back pain     Type 2 diabetes     Chronic bronchitis, mucopurulent     Diabetes with neurological manifestations, type II or unspecified type, not stated as uncontrolled     Allergies     Bronchiectasis     COPD     Hypercholesterolemia     Type II DM     Hypertension     GERD     Peripheral neuropathy attributed to type II diabetes     Erectile dysfunction due to organic reasons             Surgical History:         Tonsillectomy/Adenoidectomy      L knee;    venous port - L chest;         Family History:         Positive for Coronary Artery Disease ( mother ) and Hypertension ( mother ).      Positive for Cancer- type not specified ( sister ).      Positive for Asthma ( father ).      Positive for Type 2 Diabetes ( mother ).          Social History:     Occupation: Disabled (due to COPD)     Marital Status:      Children: 2 children         Tobacco/Alcohol/Supplements:     Last Reviewed on 7/06/2018 11:06 AM by Kimmy Riley    Tobacco: He has a past history of cigarette smoking; quit date:  1993.  Non-drinker         Substance Abuse History:     Last Reviewed on 5/22/2013 03:43 PM by Jorgito Ames            Allergies:     Last Reviewed on  "7/06/2018 10:51 AM by Marion Melendeztil: candidiasis (Adverse Reaction)        Current Medications:     Last Reviewed on 7/06/2018 10:51 AM by Marion Melendez CD  120mg Capsules, Extended Release Take 1 capsule(s) by mouth daily     Hydrocodone/Acetaminophen 10mg/325mg Tablet 1 tab every 8 hours prn     Loratadine 10mg Tablet Take 1 tablet(s) by mouth daily     Omeprazole 40mg Capsules, Extended Release Take 1 capsule(s) by mouth daily     Basaglar KwikPen 100units/1ml Injection Inject 15 units daily Dx E11.9     BD Insulin Syringe 1ml Syringe Use with Lantus at HS dx e11.9     Metformin HCl 500mg Tablets, Extended Release 2 po bid     Lisinopril 10mg Tablet 1/2 tab daily     Trazodone HCl 100mg Tablet 1 tab HS     Actos 45mg Tablet Take 1 tablet(s) by mouth daily     Amitriptyline HCl 25mg Tablet Take 1 tablet(s) by mouth at bedtime     Bydureon 2mg/1pen Injection Suspension, Extended-Release Inject 2 mg subcutaneously q week     Symbicort 160mcg/4.5mcg Oral Inhaler 1 puff BID     Breo Ellipta 200mcg/25mcg Inhalation Powder Take 1 inhalation(s) by mouth daily     Albuterol 0.083% Nebulizer Solution 1 vial(s) by nebulizer qid as directed     Hydrochlorothiazide (HCTZ) 12.5mg Tablet 1 po daily     Montelukast Sodium 10mg Tablet 1 tab daily     Ventolin HFA 90mcg/1actuation Oral Inhaler Inhale 2 puff(s) by mouth 4 times a day as needed     BD Ultra-Fine Mini Pen Needle 31G x 3/16\"  Pen Needle Use TID with insulin.     Aspirin (ASA) 81mg Tablets, Enteric Coated 1 tab daily     Humalog 100units/1ml Injection 5 units with meals     Atorvastatin Calcium 20mg Tablet 1 po q hs         OBJECTIVE:        Vitals:         Current: 7/6/2018 10:46:15 AM    Ht:  5 ft, 9 in;  Wt: 293 lbs;  BMI: 43.3    T: 97.6 F (oral);  BP: 152/75 mm Hg (left arm, sitting);  P: 110 bpm (left arm (BP Cuff), standing);  sCr: 1.07 mg/dL;  GFR: 93.98        Exams:     PHYSICAL EXAM:     GENERAL: Vitals recorded well developed, " well nourished;  well groomed;  no apparent distress;     EYES: PERRL, EOMI     E/N/T: EARS:  normal external auditory canals and tympanic membranes;  grossly normal hearing; OROPHARYNX:  normal mucosa, dentition, gingiva, and posterior pharynx;     NECK:  supple, full ROM; no thyromegaly; no carotid bruits;     RESPIRATORY: normal respiratory rate and pattern with no distress; diffuse inspiratory and expiratory wheezes;     CARDIOVASCULAR: normal rate; rhythm is regular;  normal S1; normal S2; no systolic murmur; no cyanosis; no edema;     SKIN:  skin C/D/I;     MUSCULOSKELETAL: gait: affected by a limp and unsteady;     NEUROLOGICAL:  cranial nerves, motor and sensory function, reflexes, gait and coordination are all intact;     PSYCHIATRIC:  appropriate affect and demeanor; normal speech pattern; grossly normal memory;         Lab/Test Results:             Amphetamines Screen, Urin:  Negative (07/06/2018),     BAR-Barbiturates Screen, Urin:  Negative (07/06/2018),     Buprenorphine:  Negative (07/06/2018),     BZO-Benzodiazepines Screen,Ur:  Negative (07/06/2018),     Cocaine(Metab.)Screen, Ur:  Negative (07/06/2018),     MDMA-Ecstasy:  Negative (07/06/2018),     Met-Methamphetamine:  Negative (07/06/2018),     MTD-Methadone Screen, Urine:  Negative (07/06/2018),     Opiate Screen, Urine:  Positive (07/06/2018),     OXY-Oxycodone:  Negative (07/06/2018),     PCP-Phencyclidine Screen, Uri:  Negative (07/06/2018),     THC Cannabinoids Screen, Urin:  Negative (07/06/2018),     Urine temperature:  confirmed (07/06/2018),     Date and time of last pill:  Hydrocodone 7/5/18@9pm (07/06/2018),     Performed by:  evelyne (07/06/2018),     Collection Time:  10:54 (07/06/2018),     Performed by::  tls (07/06/2018),     Hemoglobin A1c:  8.5 (07/06/2018),             ASSESSMENT           724.2   M54.5  Chronic low back pain              DDx:     250.00   E11.8  Type II DM              DDx:     272.0   E78.4  Hypercholesterolemia               DDx:     496   J44.1  COPD              DDx:     401.1   I10  Hypertension              DDx:     250.60   E11.40  Peripheral neuropathy attributed to type II diabetes              DDx:     780.4   R42  Light-headedness              DDx:     V58.69   Z79.899  Use of high risk medications              DDx:     491.21   J44.9  Decompensated chronic obstructive pulmonary disease (COPD) with exacerbation              DDx:     494.0   J47.9  Bronchiectasis              DDx:         ORDERS:         Meds Prescribed:       Prednisone 5mg Tablet 8 pills day 1, 6 pills day 2, 4 pills on day three, 2 pills on day 4, and 1 pill on day 5  #21 (Twenty One) tablet(s) Refills: 0         Lab Orders:       [id number]  Drug test prsmv read direct optical obs pr date  (In-House)         33600  Hemoglobin; glycosylated (A1C)  (In-House)           Procedures Ordered:       [id number]  Collection of capillary blood specimen (eg, finger, heel, ear stick)  (In-House)                   PLAN:          Chronic low back pain stable on meds     Passport PAF fee          Type II DM improving, he stopped checking BS due to bad meter, will give him an order for a new meter          Hypercholesterolemia stable on meds          COPD worse due to heat          Hypertension elevated          Peripheral neuropathy attributed to type II diabetes stable on meds, meds refilled           Orders:       71240  Collection of capillary blood specimen (eg, finger, heel, ear stick)  (In-House)         80198  Hemoglobin; glycosylated (A1C)  (In-House)            Light-headedness due to him not eating well in  this heat, he needs to start checking his BS and eat 3 meals with protein a day          Use of high risk medications christine reviewed, drug screen performed and appropriate, consent is reviewed and signed and on the chart, pt is aware of risk of addiction on this medication and understands that he will need to follow up for a review every 3 months and his  medications will be adjusted or decreased as deemed appropriate at each visit.  No personal history of drug or alcohol abuse.  No concerns about diversion or abuse.  He denies side effects related to the medication.  He is  aware that he may be called in for pill counts.           Orders:       [id number]  Drug test prsmv read direct optical obs pr date  (In-House)            Decompensated chronic obstructive pulmonary disease (COPD) with exacerbation watch BS-they will increase on the steroids         RECOMMENDATIONS given include: Push Fluids, Rest, Follow up if no improvement or worsening symptoms like high fevers, vomiting, weakness, or increasing shortness of air.    .            Prescriptions:       Prednisone 5mg Tablet 8 pills day 1, 6 pills day 2, 4 pills on day three, 2 pills on day 4, and 1 pill on day 5  #21 (Twenty One) tablet(s) Refills: 0          Bronchiectasis stable             CHARGE CAPTURE           **Please note: ICD descriptions below are intended for billing purposes only and may not represent clinical diagnoses**        Primary Diagnosis:         724.2 Chronic low back pain            M54.5    Low back pain              Orders:          68620   Office/outpatient visit; established patient, level 4  (In-House)             32092   Passport PAF Form  (In-House)           250.00 Type II DM            E11.8    Type 2 diabetes mellitus with unspecified complications    272.0 Hypercholesterolemia            E78.4    Other hyperlipidemia    496 COPD            J44.1    Chronic obstructive pulmonary disease with (acute) exacerbation    401.1 Hypertension            I10    Essential (primary) hypertension    250.60 Peripheral neuropathy attributed to type II diabetes            E11.40    Type 2 diabetes mellitus with diabetic neuropathy, unspecified              Orders:          09522   Collection of capillary blood specimen (eg, finger, heel, ear stick)  (In-House)             55753   Hemoglobin;  glycosylated (A1C)  (In-House)           780.4 Light-headedness            R42    Dizziness and giddiness    V58.69 Use of high risk medications            Z79.899    Other long term (current) drug therapy              Orders:          56394   Drug test prsmv read direct optical obs pr date  (In-House)           491.21 Decompensated chronic obstructive pulmonary disease (COPD) with exacerbation            J44.9    Chronic obstructive pulmonary disease, unspecified    494.0 Bronchiectasis            J47.9    Bronchiectasis, uncomplicated

## 2021-05-18 NOTE — PROGRESS NOTES
BimalPrestonCamilo 1965     Office/Outpatient Visit    Visit Date: Thu, Feb 14, 2019 11:27 am    Provider: Kimmy Riley MD (Assistant: Lorin Lambert MA)    Location: [facility]        Electronically signed by Kimmy Riley MD on  02/20/2019 09:23:56 AM                             SUBJECTIVE:        CC:     Gómez is a 53 year old White male.  He is here today following a transition of care from an inpatient hospital: [facility]. The patient was admitted on 1/28/19 & d/c on 2/5/19. The patient was admitted for pneumonia. Our office called the patient within 48 hours of discharge and scheduled the follow-up appointment. During the patient's hospital stay the patient was treated by Dr. Hankins.  (PT & VISTIOR ARE UNABLE TO GO OVER MEDS, THEY DID SAY HE IS NOT TAKING ACTOS ANYMORE) PT HAS BEEN DX WITH MRSA RESPIRATORY         HPI:     Gómez is in today for PRATIBHA from his recent hospitalizations over the past 6 weeks-he has been in the hospital 3 times for MRSA pneumonia with sepsis, acute respiratory failure, COPD acute on chronic, hyponatremia, poorly controlled IDDM, hypertension and sleep apnea.  I do not have his recent d/c summary from 1/28-2/5/19 to [facility].  He states today he feels very weak and light headed, he is unable to stand up on his own so I am unable to weigh him.  He has not been eating or drinking for past several days due to mouth pain from thrush.  In addition, he is newly on bumex 2 mg bid for past 2 weeks.  He looks like he has lost a lot of weight in past 6 weeks.  He is not checking his BS, our glucometer BS reading was 542.  His BS is extremely low.  He feels like his breathing is good and his cough is minimal.      ROS:     CONSTITUTIONAL:  Positive for fatigue.   Negative for chills or fever.      EYES:  Negative for blurred vision.      E/N/T:  Positive for sore throat and thrush.   Negative for ear pain, nasal congestion or frequent rhinorrhea.      CARDIOVASCULAR:  Positive for  dizziness.   Negative for chest pain or pedal edema.      RESPIRATORY:  Positive for dyspnea and frequent wheezing.   Negative for recent cough, chronic cough or pleuritic chest pain.      GASTROINTESTINAL:  Negative for abdominal pain, constipation, diarrhea, heartburn, hematochezia, melena, nausea and vomiting.      MUSCULOSKELETAL:  Positive for back pain.   Negative for arthralgias or myalgias.      INTEGUMENTARY:  Negative for rash.      NEUROLOGICAL:  Positive for dizziness.   Negative for headaches.      PSYCHIATRIC:  Positive for sleep disturbance.   Negative for anxiety, depression or suicidal thoughts.          PMH/FMH/SH:     Last Reviewed on 2/14/2019 12:18 PM by Kimmy iRley    Past Medical History:     Use of high risk medications     MRSA pneumonia     Chronic low back pain     Closed fracture of other tarsal and metatarsal bones     Eczema     Low back pain     Type 2 diabetes     Chronic bronchitis, mucopurulent     Diabetes with neurological manifestations, type II or unspecified type, not stated as uncontrolled     Allergies     Bronchiectasis     COPD     Hypercholesterolemia     Type II DM     Hypertension     GERD     Peripheral neuropathy attributed to type II diabetes     Erectile dysfunction due to organic reasons             Surgical History:         Tonsillectomy/Adenoidectomy      L knee;    venous port - L chest;         Family History:         Positive for Coronary Artery Disease ( mother ) and Hypertension ( mother ).      Positive for Cancer- type not specified ( sister ).      Positive for Asthma ( father ).      Positive for Type 2 Diabetes ( mother ).          Social History:     Occupation: Disabled (due to COPD)     Marital Status:      Children: 2 children         Tobacco/Alcohol/Supplements:     Last Reviewed on 2/14/2019 12:18 PM by Kimmy Riley    Tobacco: He has a past history of cigarette smoking; quit date:  1993.  Non-drinker         Substance Abuse  "History:     Last Reviewed on 5/22/2013 03:43 PM by Jorgito Ames            Allergies:     Last Reviewed on 1/09/2019 08:41 AM by Spurling, Sarah C    Proventil: candidiasis (Adverse Reaction)        Current Medications:     Last Reviewed on 1/09/2019 08:50 AM by Spurling, Sarah C    Actos 45mg Tablet Take 1 tablet(s) by mouth daily     Amitriptyline HCl 25mg Tablet Take 1 tablet(s) by mouth at bedtime     Loratadine 10mg Tablet Take 1 tablet(s) by mouth daily     Lisinopril 5mg Tablet 1 tab daily     Omeprazole 40mg Capsules, Extended Release Take 1 capsule(s) by mouth daily     Trazodone HCl 100mg Tablet 1 tab HS     Bydureon 2mg/1pen Injection Suspension, Extended-Release Inject 2 mg subcutaneously q week     Hydrocodone/Acetaminophen 10mg/325mg Tablet One PO BID PRN     Cardizem CD  120mg Capsules, Extended Release Take 1 capsule(s) by mouth daily     Hydrochlorothiazide (HCTZ) 12.5mg Tablet 1 po daily     Metformin HCl 500mg Tablets, Extended Release 2 po bid     ProAir HFA 90mcg/1actuation Oral Inhaler Inhale 2 puff(s) by mouth q 4 to 6 hr prn.     Basaglar KwikPen 100units/1ml Injection Inject 15 units daily Dx E11.9     BD Insulin Syringe 1ml Syringe Use with Lantus at HS dx e11.9     Symbicort 160mcg/4.5mcg Oral Inhaler 1 puff BID     Breo Ellipta 200mcg/25mcg Inhalation Powder Take 1 inhalation(s) by mouth daily     Albuterol 0.083% Nebulizer Solution 1 vial(s) by nebulizer qid as directed     Montelukast Sodium 10mg Tablet 1 tab daily     BD Ultra-Fine Mini Pen Needle 31G x 3/16\"  Pen Needle Use TID with insulin.     Aspirin (ASA) 81mg Tablets, Enteric Coated 1 tab daily     Diflucan 150mg Tablet 1 tablet one time     Atorvastatin Calcium 20mg Tablet 1 po q hs         OBJECTIVE:        Vitals:         Current: 2/14/2019 11:39:16 AM    Ht:  5 ft, 9 in;  Wt: 238 lbs (Estimated);  BMI: 35.1    T: 97.3 F (oral);  BP: 54/30 mm Hg (left arm, sitting);  P: 82 bpm (left arm (BP Cuff), standing);  sCr: " 0.91 mg/dL;  GFR: 101.16    O2 Sat: 100 % (room air)        Exams:     PHYSICAL EXAM:     GENERAL: Vitals recorded well groomed;  lethargic;     EYES: PERRL, EOMI     E/N/T: EARS:  normal external auditory canals and tympanic membranes;  grossly normal hearing; OROPHARYNX: tongue coated with thrush;  posterior pharynx, including tonsils, tongue, and uvula are normal;     NECK:  supple, full ROM; no thyromegaly; no carotid bruits;     RESPIRATORY: normal appearance and symmetric expansion of chest wall; normal respiratory rate and pattern with no distress; diffuse expiratory wheezes;     CARDIOVASCULAR: normal rate; rhythm is regular;  normal S1; normal S2; no systolic murmur; no cyanosis; no edema;     SKIN: skin-excoriations to L hand;     MUSCULOSKELETAL: gait: needs assist of 2 to stand and wheelchair bound;     NEUROLOGIC: mental status: oriented to person, place, and time;  GROSSLY INTACT     PSYCHIATRIC: appropriate affect and demeanor;         ASSESSMENT           482.40   J15.211  MRSA pneumonia              DDx:     276.51   E86.0  Dehydration              DDx:     112.0   B37.0  Oral candida infection              DDx:     458.8   I95.89  Hypotension, other              DDx:     491.21   J44.9  Decompensated chronic obstructive pulmonary disease (COPD)              DDx:     250.01   E10.65  IDDM              DDx:         ORDERS:         Meds Prescribed:       Clotrimazole 10mg Troches Dissolve 1 lozenge(s) by mouth tid  #30 (Thirty) dylan(s) Refills: 0       Diflucan (Fluconazole) 150mg Tablet Take 1 tablet(s) by mouth once a day for 3 days  #3 (Three) tablet(s) Refills: 0                 PLAN:          MRSA pneumonia he is doing well, respiratory status is stable for him          Dehydration sent to [place] via EMS for IVF.          Oral candida infection           Prescriptions:       Clotrimazole 10mg Troches Dissolve 1 lozenge(s) by mouth tid  #30 (Thirty) dylan(s) Refills: 0       Diflucan  (Fluconazole) 150mg Tablet Take 1 tablet(s) by mouth once a day for 3 days  #3 (Three) tablet(s) Refills: 0          Hypotension, other severe, he is not eating or drinking, unable to stand due to weakness, will transfer via EMS to [facility] for rehydration, find out what water pill he is on and assess if this needs to be stopped.          Decompensated chronic obstructive pulmonary disease (COPD) stable          IDDM -he is not taking his insulin             CHARGE CAPTURE           **Please note: ICD descriptions below are intended for billing purposes only and may not represent clinical diagnoses**        Primary Diagnosis:         482.40 MRSA pneumonia            J15.211    Pneumonia due to Methicillin susceptible Staphylococcus aureus              Orders:          10230   Transitional care manage service 14 day discharge  (In-House)           276.51 Dehydration            E86.0    Dehydration    112.0 Oral candida infection            B37.0    Candidal stomatitis    458.8 Hypotension, other            I95.89    Other hypotension    491.21 Decompensated chronic obstructive pulmonary disease (COPD)            J44.9    Chronic obstructive pulmonary disease, unspecified    250.01 IDDM            E10.65    Type 1 diabetes mellitus with hyperglycemia        ADDENDUMS:      ____________________________________    Addendum: 02/14/2019 01:01 PM - Kimmy Riley         Visit Note Faxed to:        [facility] ([facility] Number [phone]

## 2021-05-18 NOTE — PROGRESS NOTES
"Preston Wallis. 1965     Office/Outpatient Visit    Visit Date: Tue, Oct 2, 2018 03:05 pm    Provider: Kimmy Riley MD (Assistant: Raquel Pierce MA)    Location: [facility]        Electronically signed by Kimmy Riley MD on  10/04/2018 05:30:09 PM                             SUBJECTIVE:        CC: f/u on closed humerus fracture after a fall. ( PATIENT DIDN'T HAVE A MED LIST )         HPI: Mr. Gómez Wallis is a 54 yo WM with a history of Type 2 Diabetes Mellitus, severe peripheral neuropathy, unspecified closed foot fracture, severe obesity, hypertension, and ulcer of the toes; he presents today for f/u on a closed humerus fracture after a fall.         Closed humerus fracture- was taking son to work at 4am two weeks ago (9/18/18) and fell. He is uncertain why he fell, but he has a history of severe peripheral neuropathy and a foot fracture.  He took an ambulance to [facility] ER where an x-ray showed two humeral fractures. In the ER, he received the Tdap vaccine, Toradol IM 60 mg and Hydromorphine IM 1 mg for pain, and Zofran once for nausea.        Since then, he has been staying in bed.  He cannot move around by himself.  He has had 4 falls since this initial fall.  He is uncertain what is causing these falls but sometimes he feels like his \"legs are going to sleep\" or \"they are giving out.\"  He denies feeling lightheaded prior to the fall, but feels dizzy during the fall.  He endorses constant leg weakness, tingling in feet and legs, and numbness in feet. He has one little sore on a toe. For pain, taking Norco 10mg QID, will cut to BID tomorrow. Pain is 8/10. No tingling in right arm, tingling in both hands.         Saw Dr. Villa: instructed him to wear a brace for 6 weeks and return, and start PT in three weeks (will go next week).         He would like a lift chair and scooter to prevent falls.     IDDM and he is non compliant, takes his lantus once a day and bydureon, but is not checking his " BS's     ROS:     CONSTITUTIONAL:  Positive for fatigue.   Negative for chills, fever or weight change.      E/N/T:  Negative for nasal congestion and frequent rhinorrhea.      CARDIOVASCULAR:  Positive for pedal edema.   Negative for chest pain, orthopnea or paroxysmal nocturnal dyspnea.      RESPIRATORY:  Positive for chronic cough and dyspnea.      GASTROINTESTINAL:  Positive for nausea and dry heaving.   Negative for constipation or diarrhea.      MUSCULOSKELETAL:  Positive for right closed humeral fracture.      INTEGUMENTARY:  Positive for right knee abrasion and toe ulcer.      NEUROLOGICAL:  Positive for dizziness ( with standing ).   Negative for headaches or weakness.          PMH/FMH/SH:     Last Reviewed on 7/06/2018 11:06 AM by Kimmy Riley    Past Medical History:     Use of high risk medications     MRSA pneumonia     Chronic low back pain     Closed fracture of other tarsal and metatarsal bones     Eczema     Low back pain     Type 2 diabetes     Chronic bronchitis, mucopurulent     Diabetes with neurological manifestations, type II or unspecified type, not stated as uncontrolled     Allergies     Bronchiectasis     COPD     Hypercholesterolemia     Type II DM     Hypertension     GERD     Peripheral neuropathy attributed to type II diabetes     Erectile dysfunction due to organic reasons             Surgical History:         Tonsillectomy/Adenoidectomy      L knee;    venous port - L chest;         Family History:         Positive for Coronary Artery Disease ( mother ) and Hypertension ( mother ).      Positive for Cancer- type not specified ( sister ).      Positive for Asthma ( father ).      Positive for Type 2 Diabetes ( mother ).          Social History:     Occupation: Disabled (due to COPD)     Marital Status:      Children: 2 children         Tobacco/Alcohol/Supplements:     Last Reviewed on 7/06/2018 11:06 AM by Kimmy Riley    Tobacco: He has a past history of  "cigarette smoking; quit date:  1993.  Non-drinker         Substance Abuse History:     Last Reviewed on 5/22/2013 03:43 PM by Jorgito Ames            Allergies:     Last Reviewed on 10/02/2018 03:07 PM by Raquel Pierce    Proventil: candidiasis (Adverse Reaction)        Current Medications:     Last Reviewed on 10/02/2018 03:07 PM by Raquel Pierce    Cardizem CD  120mg Capsules, Extended Release Take 1 capsule(s) by mouth daily     Metformin HCl 500mg Tablets, Extended Release 2 po bid     Bydureon 2mg/1pen Injection Suspension, Extended-Release Inject 2 mg subcutaneously q week     Lisinopril 10mg Tablet 1/2 tab daily     Amitriptyline HCl 25mg Tablet Take 1 tablet(s) by mouth at bedtime     Actos 45mg Tablet Take 1 tablet(s) by mouth daily     Trazodone HCl 100mg Tablet 1 tab HS     Loratadine 10mg Tablet Take 1 tablet(s) by mouth daily     Omeprazole 40mg Capsules, Extended Release Take 1 capsule(s) by mouth daily     Lantus 100units/1ml Injection 15 qhs     BD Insulin Syringe 1ml Syringe Use with Lantus at HS dx e11.9     Symbicort 160mcg/4.5mcg Oral Inhaler 1 puff BID     Breo Ellipta 200mcg/25mcg Inhalation Powder Take 1 inhalation(s) by mouth daily     Albuterol 0.083% Nebulizer Solution 1 vial(s) by nebulizer qid as directed     Hydrochlorothiazide (HCTZ) 12.5mg Tablet 1 po daily     Montelukast Sodium 10mg Tablet 1 tab daily     Ventolin HFA 90mcg/1actuation Oral Inhaler Inhale 2 puff(s) by mouth 4 times a day as needed     BD Ultra-Fine Mini Pen Needle 31G x 3/16\"  Pen Needle Use TID with insulin.     Aspirin (ASA) 81mg Tablets, Enteric Coated 1 tab daily     Hydrocodone/Acetaminophen 10mg/325mg Tablet 1 po qid for 2 weeks, then resume 1 po bid     Atorvastatin Calcium 20mg Tablet 1 po q hs         OBJECTIVE:        Vitals:         Current: 10/2/2018 3:11:52 PM    Ht:  5 ft, 9 in;  Wt: 294.2 lbs;  BMI: 43.4    T: 98.4 F (oral);  BP: 122/59 mm Hg (left arm, sitting);  P: 98 bpm (left arm (BP " Cuff), standing);  sCr: 1.07 mg/dL;  GFR: 94.14        Repeat:     4:36:22 PM     BP:   136/65mm Hg (left arm, lying, PULSE 95    O2 )     4:39:33 PM     BP:   109/52mm Hg (left arm, sitting, PULSE 93    02 SAT 95)     4:43:10 PM     BP:   99/51mm Hg (PULSE 103    02 97)         Exams:     PHYSICAL EXAM:     GENERAL: Vitals recorded well developed, well nourished;  well groomed;  no apparent distress;     E/N/T: OROPHARYNX:  normal mucosa, dentition, gingiva, and posterior pharynx;     NECK:  supple, full ROM; no thyromegaly; no carotid bruits;     RESPIRATORY: normal respiratory rate and pattern with no distress; normal breath sounds with no rales, rhonchi, wheezes or rubs;     CARDIOVASCULAR: normal rate; rhythm is regular;  normal S1; normal S2; no systolic murmur; no cyanosis; no edema;     MUSCULOSKELETAL: gait: affected by a limp and unsteady;     NEUROLOGICAL:  cranial nerves, motor and sensory function, reflexes, gait and coordination are all intact;     PSYCHIATRIC:  appropriate affect and demeanor; normal speech pattern; grossly normal memory;         ASSESSMENT           812.20   S42.301D  Closed fracture of humerus, unspecified              DDx:     401.1   I10  Hypertension              DDx:     250.60   E10.49  Peripheral neuropathy attributed to type II diabetes              DDx:     491.21   J44.9  Decompensated chronic obstructive pulmonary disease (COPD)              DDx:     250.01   E10.65  IDDM              DDx:     780.4   R42  Dizziness              DDx:         ORDERS:         Meds Prescribed:       Glucose Reagent Blood Test Strips (Glucose Reagent Blood Test Strips) Reagent Strips Check blood sugar 1-2 times per day E11.9  #100 (One [place]) strip(s) Refills: 2       Lancet  Lancet 1-2 times daily w/ one touch verio Dx E11.9  #100 (One [place]) lancet Refills: 2         Lab Orders:       ORTHO  Orthostatic blood pressure  (In-House)                   PLAN:          Closed fracture of  humerus, unspecified comminuted fracture, non surgical, Pain is controlled-he defers higher pain med, he has a shoulder brace in place and seen Dr Lau and told he needs to be in this brace for 6 weeks. He does not qualify for  a scooter or lift chair, needs to use his cane and he has a wheelchair if needed, has dizzy spells and poor coordination and balance, he needs to stop hctz and lisinopril, f/u Monday and start checking his BS and getting them under tight control to prevent his worsening PN          Hypertension stop lisinopril and hctz           Orders:       ORTHO  Orthostatic blood pressure  (In-House)            Peripheral neuropathy attributed to type II diabetes progressively worsening, and he is having trouble with balance and this appears to be the cause of his pain          Decompensated chronic obstructive pulmonary disease (COPD) stable and well controlled          IDDM he needs to start checking his BS at a minimum of bid           Prescriptions:       Glucose Reagent Blood Test Strips (Glucose Reagent Blood Test Strips) Reagent Strips Check blood sugar 1-2 times per day E11.9  #100 (One [place]) strip(s) Refills: 2       Lancet  Lancet 1-2 times daily w/ one touch verio Dx E11.9  #100 (One [place]) lancet Refills: 2          Dizziness due to BP drops with orthostatics, stop hctz and the lisinopril for now.  Push fluids, rest, will order a wheelchair, he wants a lift chair and I am ok to order this for him but insurance will not pay for it             CHARGE CAPTURE           **Please note: ICD descriptions below are intended for billing purposes only and may not represent clinical diagnoses**        Primary Diagnosis:         812.20 Closed fracture of humerus, unspecified            S42.301D    Unspecified fracture of shaft of humerus, right arm, subsequent encounter for fracture with routine healing              Orders:          98959   Office/outpatient visit; established patient, level 4   (In-House)           401.1 Hypertension            I10    Essential (primary) hypertension              Orders:          ORTHO   Orthostatic blood pressure  (In-House)           250.60 Peripheral neuropathy attributed to type II diabetes            E10.49    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    491.21 Decompensated chronic obstructive pulmonary disease (COPD)            J44.9    Chronic obstructive pulmonary disease, unspecified    250.01 IDDM            E10.65    Type 1 diabetes mellitus with hyperglycemia    780.4 Dizziness            R42    Dizziness and giddiness

## 2021-05-18 NOTE — PROGRESS NOTES
Preston Wallis  1965     Office/Outpatient Visit    Visit Date: Mon, Dec 16, 2019 02:35 pm    Provider: Talisha Chaudhry N.P. (Assistant: Cary Verdugo RN)    Location: [facility]        Electronically signed by Talisha Chaudhry N.P. on  12/16/2019 06:23:42 PM                             Subjective:        CC: Gómez is a 54 year old White male.  states he doesnt feel any better from previous appt         HPI: Gómez presents with acute upper respiratory infection, unspecified.  These have been present for the past 3 weeks.  The symptoms include sputum production and shortness of breath.  He denies fever.  He reports recent exposure to illness from grandchildren. Sputum culture last month positive for moraxella catarrhalis and pseudomonas aeruginosa. Medical history is significant for COPD.  Breo daily, Albuterol. Has taken Augmentin and Levaquin. Using Albuterol neb twice per day. Prescribed course of prednisone.    ROS:     CONSTITUTIONAL:  Negative for chills and fever.      EYES:  Negative for blurred vision and eye drainage.      E/N/T:  Negative for ear pain and sore throat.      CARDIOVASCULAR:  Negative for chest pain and palpitations.      RESPIRATORY:  Positive for recent cough, dyspnea and frequent wheezing.      INTEGUMENTARY:  Positive for rash.      NEUROLOGICAL:  Negative for dizziness and headaches.      PSYCHIATRIC:  Negative for depression and suicidal thoughts.          Past Medical History / Family History / Social History:         Last Reviewed on 9/18/2019 08:33 PM by Pawan Teresa    Past Medical History:     Use of high risk medications     MRSA pneumonia     Chronic low back pain     Closed fracture of other tarsal and metatarsal bones     Eczema     Low back pain     Type 2 diabetes     Chronic bronchitis, mucopurulent     Diabetes with neurological manifestations, type II or unspecified type, not stated as uncontrolled     Allergies     Bronchiectasis     COPD      Hypercholesterolemia     Type II DM     Hypertension     GERD     Peripheral neuropathy attributed to type II diabetes     Erectile dysfunction due to organic reasons                 PREVENTIVE HEALTH MAINTENANCE             EYE EXAM: was last done 4/24/19         Surgical History:         Tonsillectomy/Adenoidectomy     L knee;    venous port - L chest;         Family History:         Positive for Coronary Artery Disease ( mother ) and Hypertension ( mother ).      Positive for Cancer- type not specified ( sister ).      Positive for Asthma ( father ).      Positive for Type 2 Diabetes ( mother ).          Social History:     Occupation: Disabled (due to COPD)     Marital Status:      Children: 2 children         Tobacco/Alcohol/Supplements:     Last Reviewed on 12/05/2019 11:26 AM by Sosa Domínguez    Tobacco: He has a past history of cigarette smoking; quit date:  1993.  Non-drinker         Substance Abuse History:     Last Reviewed on 9/18/2019 08:33 PM by Pawan Teresa        Mental Health History:     Last Reviewed on 9/18/2019 08:33 PM by Pawan Teresa        Communicable Diseases (eg STDs):     Last Reviewed on 9/18/2019 08:33 PM by Pawan Teresa        Current Problems:     Last Reviewed on 11/11/2019 08:49 AM by Kimmy Riley    Low back pain    Other long term (current) drug therapy    Chronic low back pain    Unspecified closed foot fracture    Morbid (severe) obesity due to excess calories    Obstructive sleep apnea (adult) (pediatric)    Chronic obstructive pulmonary disease, unspecified    Decompensated chronic obstructive pulmonary disease (COPD)    IDDM    Foot ulcer    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 without evidence of necrosis    MRSA pneumonia    Major depressive disorder, recurrent, mild    Essential hypertension, benign    Fall NOS    Unspecified fall, initial encounter    Type 1 diabetes mellitus with diabetic chronic kidney  "disease    Chronic obstructive pulmonary disease with (acute) exacerbation        Immunizations:     influenza, injectable, quadrivalent, preservative free 12/5/2019    zzFluzone pf-quadrivalent 3 and up 10/18/2016    zzFluzone pf-quadrivalent 3 and up 10/16/2017    Fluzone pf (3+ years dose) 9/19/2013    Fluzone Quadrivalent (3+ years) 10/8/2018    Tdap (Tetanus, reduced diph, acellular pertussis) 9/18/2018        Allergies:     Last Reviewed on 12/05/2019 11:26 AM by Sosa Domínguezl:      Proventil: candidiasis  (Adverse Reaction)        Current Medications:     Last Reviewed on 11/11/2019 08:49 AM by Kimmy Riley    Mucinex D     Aspirin Low Dose     Claritin     Glucose Reagent Blood Test Strips  Reagent Strips [Check  blood sugars AC HS. Dispense brand covered by insurance.e11.9]    HYDROcodone-acetaminophen  mg oral tablet [One PO BID PRN]    BD Ultra-Fine Mini Pen Needle 31 gauge x 3/16\"  [use w/ trulicity & basaglar]    Omeprazole 40 mg oral capsule,delayed release (enteric coated) [Take 1 capsule(s) by mouth daily]    Loratadine 10 mg oral tablet [Take 1 tablet(s) by mouth daily]    Ventolin HFA 90 mcg/actuation Inhalation HFA Aerosol Inhaler [Inhale 2 puff(s) by mouth 4 times a day as needed]    dilTIAZem HCl 120 mg oral Capsule, Extended Release 24 hr [TAKE 1 CAPSULE BY MOUTH EVERY DAY]    metFORMIN 500 mg oral Tablet, Extended Release 24 hr [TAKE 2 TABLETS BY MOUTH TWICE DAILY]    Breo Ellipta 200mcg/25mcg Inhalation Powder [Take 1 inhalation(s) by mouth daily]    furosemide 40 mg oral tablet [TAKE 1 TABLET BY MOUTH EVERY DAY]    Basaglar KwikPen 100units/1ml Injection [inject 35units daily  DX  E11.9]    Losartan 50 mg oral tablet [Take 1 tablet(s) by mouth bid]    Gabapentin 400 mg oral capsule [1 TAB BID]    hydroCHLOROthiazide 12.5 mg oral capsule [TAKE 1 CAPSULE BY MOUTH TWICE DAILY]    Eliquis 5mg Tablet [take 1 tab BID]    Trulicity PEN 1.5mg/0.5ml Injection [inject " 1.5mg once a week]    Cefepime 1 gram IV  BID times 7 days     losartan 50 mg oral tablet [take 1 tablet (50 mg) by oral route once daily]    traZODone 150 mg oral tablet [take 1 tablet (150 mg) by oral route qhs]    Cymbalta 60 mg oral capsule,delayed release (enteric coated) [take 1 capsule (60 mg) by oral route once daily]    fluticasone propionate 50 mcg/actuation Intranasal Spray, Suspension [inhale 1 spray (50 mcg) in each nostril by intranasal route 2 times per day]    HYDROcodone-acetaminophen  mg oral tablet [1 tab PO BID PRN]    Levaquin 500 mg oral tablet [take 1 tablet (500 mg) by oral route daily times 10 days]    predniSONE 10 mg oral tablet [take 4 tablets by oral route once daily for 4 days]    Augmentin 875-125 mg oral tablet [take 1 tablet by oral route every 12 hours]    Levaquin 500 mg oral tablet [take 1 tablet (500 mg) by oral route every 24 hours]        Objective:        Vitals:         Current: 12/16/2019 2:41:22 PM    Ht:  5 ft, 9 in;  Wt: 272 lbs;  BMI: 40.2T: 97.9 F (oral);  BP: 147/63 mm Hg (left arm, sitting);  P: 85 bpm (left arm (BP Cuff), sitting);  sCr: 1.53 mg/dL;  GFR: 62.97O2 Sat: 96 % (2 liters O2)        Exams:     PHYSICAL EXAM:     GENERAL: well developed, well nourished;  no apparent distress;     EYES: PERRL, EOMI     E/N/T: EARS: external auditory canal normal;  both TMs are dull;  NOSE: normal turbinates; no sinus tenderness; OROPHARYNX: oral mucosa is normal; posterior pharynx shows no exudate and post nasal drip;     NECK: range of motion is normal; trachea is midline;     RESPIRATORY: normal appearance and symmetric expansion of chest wall; normal respiratory rate and pattern with no distress; diffuse expiratory wheezes;     CARDIOVASCULAR: normal rate; rhythm is regular;     SKIN: a rash is noted on the chest;  the color is mainly pink;  it is best characterized as maculopapular;     NEUROLOGIC: mental status: alert and oriented x 3; GROSSLY INTACT      PSYCHIATRIC: appropriate affect and demeanor;         Assessment:         J44.1   Chronic obstructive pulmonary disease with (acute) exacerbation       R21   Rash and other nonspecific skin eruption           ORDERS:         Meds Prescribed:       [New Rx] predniSONE 20 mg oral tablet [take 2 tablets by oral route once daily for 7 days], #14 (fourteen) tablets, Refills: 0 (zero)       [New Rx] ipratropium-albuterol 0.5 mg-3 mg(2.5 mg base)/3 mL Inhalation Solution for Nebulization [inhale 3 milliliters by nebulization route 4 times per day as needed], #25 (twenty five) unspecified, Refills: 0 (zero)       [New Rx] Lotrisone 1-0.05 % Topical Cream [apply to the affected and surrounding areas of skin by topical route 2 times per day in the morning and evening], #30 (thirty) grams, Refills: 0 (zero)         Radiology/Test Orders:       [id number]  Radiologic exam chest 2 views  (Send-Out)              Lab Orders:       [id number]  [facility] Sputum Culture  (Send-Out)                      Plan:         Chronic obstructive pulmonary disease with (acute) exacerbationContinue course of steroids. Duonebs. Check sputum culture and chest xray. Follow up with pulmonology. ER precautions given.    LABORATORY:  Labs ordered to be performed today include sputum culture.      RADIOLOGY:  I have ordered a chest x-ray (PA and lateral) to be done today.      RECOMMENDATIONS given include: Further recommendation to be given after test results are complete.      FOLLOW-UP: Chronic visit follow up           Prescriptions:       [New Rx] predniSONE 20 mg oral tablet [take 2 tablets by oral route once daily for 7 days], #14 (fourteen) tablets, Refills: 0 (zero)       [New Rx] ipratropium-albuterol 0.5 mg-3 mg(2.5 mg base)/3 mL Inhalation Solution for Nebulization [inhale 3 milliliters by nebulization route 4 times per day as needed], #25 (twenty five) unspecified, Refills: 0 (zero)           Orders:       [id number]  [facility] Sputum Culture   (Send-Out)            [id number]  Radiologic exam chest 2 views  (Send-Out)              Rash and other nonspecific skin eruption          Prescriptions:       [New Rx] Lotrisone 1-0.05 % Topical Cream [apply to the affected and surrounding areas of skin by topical route 2 times per day in the morning and evening], #30 (thirty) grams, Refills: 0 (zero)             Charge Capture:         Primary Diagnosis:     J44.1  Chronic obstructive pulmonary disease with (acute) exacerbation           Orders:      [id number]  Office/outpatient visit; established patient, level 4  (In-House)              R21  Rash and other nonspecific skin eruption

## 2021-05-18 NOTE — PROGRESS NOTES
Preston WallisCamilo 1965     Office/Outpatient Visit    Visit Date: Wed, Feb 27, 2019 03:20 pm    Provider: Kimmy Riley MD (Assistant: Sarah Spurling, MA)    Location: [facility]        Electronically signed by Kimmy Riley MD on  03/01/2019 09:43:08 AM                             SUBJECTIVE:        CC: hypotension         HPI:     Gómez is in today for PRATIBHA from his recent hospitalization for dehydration after being sent home MRSA pneumonia with sepsis, acute respiratory failure, COPD acute on chronic, hyponatremia, poorly controlled IDDM, hypertension and sleep apnea.  He came in for his PRATIBHA and was very weak and light headed, he is unable to stand up on his own.  He has not been eating or drinking for past several days due to mouth pain from thrush.   He was newly on bumex 2 mg bid and hydralazine 25 mg tid .  He was admitted, treated with IVFs, given steroids for COPD exacerbation.  He did have kidney injury due to dehydration.  He was sent home on bumex 2 mg daily and hydralazine 25 mg bid.  He is in with his BS diary-she is on bydureon and basaglar 40 units a day, BS diary showed BS 2-330 until last 2 days, BS <150.  He is still light headed,dasia in AM and not getting out of bed due to this.  Poor motivation, but he denies depression.  He feels like he is breathing. His thrush has resolved.  He has completed the prednisone.  He is almost done with nystatin.  He is eating ok and BM are normal.         Dx with iDDM; specifically, this is type 1 diabetes, complicated by peripheral neuropathy.  Compliance with treatment has been good; he takes his medication as directed and is keeping a glucose diary.  Primary symptoms reported include fatigue and peripheral neuropathy.  He specifically denies blurred vision, leg cramps, polydipsia, polyphagia, polyuria, weakness or yeast infections.  Depression screening is positive for anhedonia, altered sleep habits and feelings of worthlessness.      Tobacco  screen: Non-smoker.  Current meds include an oral hypoglycemic ( Actos, Glucotrol, and bydureon ), insulin/injectable ( basaglar ), aspirin, and a lipid lowering agent.  He reports home blood glucose readings have averaged fasting readings in the 2-300 until last 2 days-130 and 120 mg/dL range. He checks his glucose 1 to 2 times daily.  Most recent lab results include Alkaline Phosphatase:  160 (U/L) (10/09/2018), ALT (SGPT):  22 (U/L) (10/09/2018), AST (SGOT):  34 (U/L) (10/09/2018), Creatinine, Serum:  0.91 (mg/dl) (10/09/2018), Glom Filt Rate, Est:  >60 (ml/min/1.73m2) (10/09/2018), HDL:  41 (mg/dL) (10/09/2018), Hemoglobin A1c:  7.6 (%) (10/09/2018), LDL:  60 (mg/dL) (10/09/2018), Total Cholesterol:  137 (mg/dL) (10/09/2018), Triglycerides:  179 (mg/dL) (10/09/2018).  Has not fallen recently In regard to preventative care, his last ophthalmology exam was in 10/2017-appt schedule 1/2019.      ROS:     CONSTITUTIONAL:  Positive for fatigue.   Negative for chills or fever.      E/N/T:  Negative for ear pain, nasal congestion, frequent rhinorrhea, sore throat and thrush.      CARDIOVASCULAR:  Positive for dizziness.   Negative for chest pain, palpitations or pedal edema.      RESPIRATORY:  Positive for frequent wheezing.   Negative for recent cough, chronic cough, dyspnea or pleuritic chest pain.      GASTROINTESTINAL:  Negative for abdominal pain, constipation, diarrhea, hematochezia, melena, nausea and vomiting.      MUSCULOSKELETAL:  Positive for back pain.   Negative for arthralgias or myalgias.      NEUROLOGICAL:  Positive for dizziness.   Negative for headaches or weakness.      PSYCHIATRIC:  Positive for feelings of stress, anhedonia, difficulty concentrating and sleep disturbance.   Negative for anxiety or suicidal thoughts.          PMH/FMH/SH:     Last Reviewed on 2/27/2019 04:37 PM by Kimmy Riley    Past Medical History:     Use of high risk medications     MRSA pneumonia     Chronic low back pain      Closed fracture of other tarsal and metatarsal bones     Eczema     Low back pain     Type 2 diabetes     Chronic bronchitis, mucopurulent     Diabetes with neurological manifestations, type II or unspecified type, not stated as uncontrolled     Allergies     Bronchiectasis     COPD     Hypercholesterolemia     Type II DM     Hypertension     GERD     Peripheral neuropathy attributed to type II diabetes     Erectile dysfunction due to organic reasons             Surgical History:         Tonsillectomy/Adenoidectomy      L knee;    venous port - L chest;         Family History:         Positive for Coronary Artery Disease ( mother ) and Hypertension ( mother ).      Positive for Cancer- type not specified ( sister ).      Positive for Asthma ( father ).      Positive for Type 2 Diabetes ( mother ).          Social History:     Occupation: Disabled (due to COPD)     Marital Status:      Children: 2 children         Tobacco/Alcohol/Supplements:     Last Reviewed on 2/27/2019 04:37 PM by Kimmy Riley    Tobacco: He has a past history of cigarette smoking; quit date:  1993.  Non-drinker         Substance Abuse History:     Last Reviewed on 5/22/2013 03:43 PM by Jorgito Ames            Allergies:     Last Reviewed on 2/14/2019 11:32 AM by Lorin Lambertryl:    Proventil: candidiasis (Adverse Reaction)        Current Medications:     Last Reviewed on 2/27/2019 03:26 PM by Spurling, Grecia C    Basaglar KwikPen 100units/1ml Injection Inject 40 units daily DX E11.9     Glucose Reagent Blood Test Strips  Reagent Strips Check  blood sugars AC HS. Dispense brand covered by insurance.e11.9     Hydrocodone/Acetaminophen 10mg/325mg Tablet One PO BID PRN     Amitriptyline HCl 25mg Tablet Take 1 tablet(s) by mouth at bedtime     Loratadine 10mg Tablet Take 1 tablet(s) by mouth daily     Omeprazole 40mg Capsules, Extended Release Take 1 capsule(s) by mouth daily     Trazodone HCl 100mg Tablet 1  "tab HS     Bydureon 2mg/1pen Injection Suspension, Extended-Release Inject 2 mg subcutaneously q week     Cardizem CD  120mg Capsules, Extended Release Take 1 capsule(s) by mouth daily     Metformin HCl 500mg Tablets, Extended Release 2 po bid     ProAir HFA 90mcg/1actuation Oral Inhaler Inhale 2 puff(s) by mouth q 4 to 6 hr prn.     Losartan 50mg Tablet Take 1 tablet(s) by mouth bid     Aspirin (ASA) 81mg Tablets, Enteric Coated 1 tab daily     BD Ultra-Fine Mini Pen Needle 31G x 3/16\"  Pen Needle use dalharoon with bydureon and lantus as directed     Glucose Monitoring Care Kit  Kit check blood sugar ac and hs dispense covered meter test strips and lancets Dx E11.9     Lancet   Lancet check blood sugars AC and HS dx e11.9     Atorvastatin Calcium 20mg Tablet 1 po q hs     Dulera 200mcg/5mcg Oral Inhaler 2 PUFFS BID     Hydralazine HCl 25mg Tablets 1 tab BID     Metoprolol 50mg Tablet 1 tab bid     Bumetanide 2mg Tablet 1 tab daily         OBJECTIVE:        Vitals:         Current: 2/27/2019 3:31:35 PM    Ht:  5 ft, 9 in;  Wt: 285 lbs;  BMI: 42.1    T: 97.5 F (oral);  BP: 98/56 mm Hg (left arm, sitting);  P: 92 bpm (left arm (BP Cuff), standing);  sCr: 0.91 mg/dL;  GFR: 109.21    O2 Sat: 99 % (room air)        Exams:     PHYSICAL EXAM:     GENERAL: Vitals recorded well groomed;  no apparent distress;     E/N/T: EARS:  normal external auditory canals and tympanic membranes;  grossly normal hearing; OROPHARYNX:  normal mucosa, dentition, gingiva, and posterior pharynx;     NECK:  supple, full ROM; no thyromegaly; no carotid bruits;     RESPIRATORY: normal appearance and symmetric expansion of chest wall; normal respiratory rate and pattern with no distress; expiratory wheezes in the mild;     CARDIOVASCULAR: normal rate; rhythm is regular;  normal S1; normal S2; no systolic murmur; no cyanosis; no edema;     SKIN: skin-excoriations to L hand;     MUSCULOSKELETAL: gait: wheelchair bound;     NEUROLOGIC: mental status: " oriented to person, place, and time;  GROSSLY INTACT     PSYCHIATRIC: affect/demeanor: flat;  normal psychomotor function; speech pattern: flat;  normal thought and perception;         ASSESSMENT           458.8   I95.89  Hypotension, other              DDx:     491.21   J44.9  Decompensated chronic obstructive pulmonary disease (COPD)              DDx:     250.01   E10.65  IDDM              DDx:     296.31   F33.0  Major depression, recurrent episode, mild              DDx:         ORDERS:         Meds Prescribed:       Cymbalta (Duloxetine HCl) 30mg Capsules, Delayed Release 1 capsule daily  #30 (Thirty) capsule(s) Refills: 1       Albuterol 0.083% Nebulizer Solution 1 vial q 6 hours prn SOA/Wheezing  DX J20.9  #120 ([employer] [place]) vial(s) Refills: 2                 PLAN:          Hypotension, other decrease bumex to 1 mg a day, hold hydralazine tonight only, decrease metoprolol to 25 mg 1/2 pill bid          Decompensated chronic obstructive pulmonary disease (COPD) he is doing well          IDDM decrease basaglar to 38 units nightly and call Monday and weekly with BS readings          Major depression, recurrent episode, mild           Prescriptions:       Cymbalta (Duloxetine HCl) 30mg Capsules, Delayed Release 1 capsule daily  #30 (Thirty) capsule(s) Refills: 1       Albuterol 0.083% Nebulizer Solution 1 vial q 6 hours prn SOA/Wheezing  DX J20.9  #120 (One [place] [employer]) vial(s) Refills: 2             CHARGE CAPTURE           **Please note: ICD descriptions below are intended for billing purposes only and may not represent clinical diagnoses**        Primary Diagnosis:         458.8 Hypotension, other            I95.89    Other hypotension              Orders:          [id number]   Office/outpatient visit; established patient, level 4  (In-House)           491.21 Decompensated chronic obstructive pulmonary disease (COPD)            J44.9    Chronic obstructive pulmonary disease, unspecified    250.01  IDDM            E10.65    Type 1 diabetes mellitus with hyperglycemia    296.31 Major depression, recurrent episode, mild            F33.0    Major depressive disorder, recurrent, mild

## 2021-05-18 NOTE — PROGRESS NOTES
Preston Wallis 1965     Office/Outpatient Visit    Visit Date: Mon, Dec 31, 2018 12:24 pm    Provider: Talisha Chaudhry N.P. (Assistant: Yessi Stockton MA)    Location: [facility]        Electronically signed by Talisha Chaudhry N.P. on  12/31/2018 02:18:35 PM                             SUBJECTIVE:        CC:     Gómez is a 53 year old White male.  presents today due to cough         HPI:         Patient complains of upper respiratory illness.  These have been present for the past 3 days.  The symptoms include productive cough and fever to 101 degrees.  He reports recent exposure to illness from family with flu.  He has already tried to relieve the symptoms with Phenergan.  Medical history is significant for COPD.      ROS:     CONSTITUTIONAL:  Positive for chills and fever.      EYES:  Negative for blurred vision and eye drainage.      E/N/T:  Negative for ear pain and sore throat.      CARDIOVASCULAR:  Negative for chest pain and palpitations.      RESPIRATORY:  Positive for recent cough, dyspnea and frequent wheezing.      GASTROINTESTINAL:  Positive for nausea.   Negative for abdominal pain or vomiting.          [facility]/:     Last Reviewed on 10/08/2018 04:27 PM by Kimmy Riley    Past Medical History:     Use of high risk medications     MRSA pneumonia     Chronic low back pain     Closed fracture of other tarsal and metatarsal bones     Eczema     Low back pain     Type 2 diabetes     Chronic bronchitis, mucopurulent     Diabetes with neurological manifestations, type II or unspecified type, not stated as uncontrolled     Allergies     Bronchiectasis     COPD     Hypercholesterolemia     Type II DM     Hypertension     GERD     Peripheral neuropathy attributed to type II diabetes     Erectile dysfunction due to organic reasons             Surgical History:         Tonsillectomy/Adenoidectomy      L knee;    venous port - L chest;         Family History:         Positive for Coronary  Artery Disease ( mother ) and Hypertension ( mother ).      Positive for Cancer- type not specified ( sister ).      Positive for Asthma ( father ).      Positive for Type 2 Diabetes ( mother ).          Social History:     Occupation: Disabled (due to COPD)     Marital Status:      Children: 2 children         Tobacco/Alcohol/Supplements:     Last Reviewed on 10/29/2018 01:52 PM by Spurling, Sarah C    Tobacco: He has a past history of cigarette smoking; quit date:  1993.  Non-drinker         Substance Abuse History:     Last Reviewed on 5/22/2013 03:43 PM by Jorgito Ames            Current Problems:     Last Reviewed on 10/29/2018 03:18 PM by Lilo Preciado    Acute exacerbation of chronic obstructive pulmonary disease (COPD)     Foot ulcer     Hypertension     Decompensated chronic obstructive pulmonary disease (COPD)     Dizziness     IDDM     Decompensated chronic obstructive pulmonary disease (COPD) with exacerbation     Light-headedness     Obstructive sleep apnea     Chronic bronchitis, obstructive, with (acute) exacerbation     Microscopic hematuria     Severe obesity     Unspecified closed foot fracture     Ulcer of toes     Use of high risk medications     Chronic low back pain     Eczema     Diabetes with neurological manifestations, type II or unspecified type, not stated as uncontrolled     Allergies     Bronchiectasis     COPD     Hypercholesterolemia     GERD     Peripheral neuropathy attributed to type II diabetes     Erectile dysfunction due to organic reasons         Immunizations:     zzFluzone pf-quadrivalent 3 and up 10/18/2016     zzFluzone pf-quadrivalent 3 and up 10/16/2017     Fluzone pf (3+ years dose) 9/19/2013     Fluzone Quadrivalent (3+ years) 10/8/2018     Tdap (Tetanus, reduced diph, acellular pertussis) 9/18/2018         Allergies:     Last Reviewed on 10/29/2018 01:52 PM by Spurling, Sarah C    Proventil: candidiasis (Adverse Reaction)        Current  "Medications:     Last Reviewed on 10/29/2018 01:55 PM by Spurling, Sarah C    Hydrocodone/Acetaminophen 10mg/325mg Tablet One PO BID PRN     Actos 45mg Tablet Take 1 tablet(s) by mouth daily     Amitriptyline HCl 25mg Tablet Take 1 tablet(s) by mouth at bedtime     Trazodone HCl 100mg Tablet 1 tab HS     Loratadine 10mg Tablet Take 1 tablet(s) by mouth daily     Omeprazole 40mg Capsules, Extended Release Take 1 capsule(s) by mouth daily     Basaglar KwikPen 100units/1ml Injection Inject 15 units daily Dx E11.9     Hydrochlorothiazide (HCTZ) 12.5mg Tablet 1 po daily     Cardizem CD  120mg Capsules, Extended Release Take 1 capsule(s) by mouth daily     Metformin HCl 500mg Tablets, Extended Release 2 po bid     Bydureon 2mg/1pen Injection Suspension, Extended-Release Inject 2 mg subcutaneously q week     BD Insulin Syringe 1ml Syringe Use with Lantus at HS dx e11.9     Symbicort 160mcg/4.5mcg Oral Inhaler 1 puff BID     Breo Ellipta 200mcg/25mcg Inhalation Powder Take 1 inhalation(s) by mouth daily     Albuterol 0.083% Nebulizer Solution 1 vial(s) by nebulizer qid as directed     Montelukast Sodium 10mg Tablet 1 tab daily     Ventolin HFA 90mcg/1actuation Oral Inhaler Inhale 2 puff(s) by mouth 4 times a day as needed     BD Ultra-Fine Mini Pen Needle 31G x 3/16\"  Pen Needle Use TID with insulin.     Aspirin (ASA) 81mg Tablets, Enteric Coated 1 tab daily     Atorvastatin Calcium 20mg Tablet 1 po q hs         OBJECTIVE:        Vitals:         Current: 12/31/2018 12:31:42 PM    Ht:  5 ft, 9 in;  Wt: 288.2 lbs;  BMI: 42.6    T: 98.1 F (oral);  BP: 132/57 mm Hg (left arm, sitting);  P: 103 bpm (left arm (BP Cuff), standing);  sCr: 0.91 mg/dL;  GFR: 109.73    O2 Sat: 95 % (room air)        Exams:     PHYSICAL EXAM:     GENERAL: well developed, well nourished;  no apparent distress;     EYES: PERRL, EOMI     E/N/T: EARS: external auditory canal normal;  both TMs are dull;  NOSE: normal turbinates; no sinus tenderness; " OROPHARYNX: oral mucosa is normal; posterior pharynx shows no exudate and post nasal drip;     NECK: range of motion is normal; trachea is midline;     RESPIRATORY: normal appearance and symmetric expansion of chest wall; normal respiratory rate and pattern with no distress; diffuse expiratory wheezes;     CARDIOVASCULAR: normal rate; rhythm is regular;     GASTROINTESTINAL: nontender; normal bowel sounds; no organomegaly;     MUSCULOSKELETAL: normal gait;     NEUROLOGIC: mental status: alert and oriented x 3; GROSSLY INTACT     PSYCHIATRIC: appropriate affect and demeanor;         Lab/Test Results:             Influenza A and B:  Negative (12/31/2018),     Performed by::   (12/31/2018),             ASSESSMENT           487.1   J10.1  Influenza-like syndrome              DDx:     496   J44.1  COPD              DDx:         ORDERS:         Meds Prescribed:       Promethazine HCl 25mg Tablet 1/2 - 1 tab q 4-6 hrs prn   prn nausea and vomiting  #20 (Twenty) tablet(s) Refills: 0       Tessalon Perles (Benzonatate) 100mg Capsules One PO Q 8 hours PRN cough  #30 (Thirty) capsule(s) Refills: 0       Mucinex (Guaifenesin) 600mg Tablets, Extended Release 1 bid prn  #60 (Sixty) tablet(s) Refills: 0       ProAir HFA (Albuterol) 90mcg/1actuation Oral Inhaler Inhale 2 puff(s) by mouth q 4 to 6 hr prn.  #1 (One) gm Refills: 1       Albuterol 0.083% Nebulizer Solution 1 vial q 4 hours prn  #100 (One Dixie) vial(s) Refills: 0       Prednisone 10mg Tablet take 6 pills today, 5 pills tomorrow, 4 pills day 3, 3 pills day 4, 2 pills day 5 and 1 pill day 6  #21 (Twenty One) tablet(s) Refills: 0         Lab Orders:       [id number]  Infectious agent antigen detection by immunoassay; Influenza  (In-House)         63390-61  Infectious agent antigen detection by immunoassay; Influenza  (In-House)                   PLAN:          Influenza-like syndrome Wife being seen today as well. She tested positive for influenza B. Daughter also recently  had flu B.     LABORATORY:  Labs ordered to be performed today include Flu A&B Flu A Flu B.      RECOMMENDATIONS given include: Push Fluids, Rest, Follow up if no improvement or worsening symptoms like high fevers, vomiting, weakness, or increasing shortness of air.    .      FOLLOW-UP: Schedule follow-up appointments on a p.r.n. basis. Chronic visit follow up           Prescriptions:       Promethazine HCl 25mg Tablet 1/2 - 1 tab q 4-6 hrs prn   prn nausea and vomiting  #20 (Twenty) tablet(s) Refills: 0       Tessalon Perles (Benzonatate) 100mg Capsules One PO Q 8 hours PRN cough  #30 (Thirty) capsule(s) Refills: 0       Mucinex (Guaifenesin) 600mg Tablets, Extended Release 1 bid prn  #60 (Sixty) tablet(s) Refills: 0       ProAir HFA (Albuterol) 90mcg/1actuation Oral Inhaler Inhale 2 puff(s) by mouth q 4 to 6 hr prn.  #1 (One) gm Refills: 1       Albuterol 0.083% Nebulizer Solution 1 vial q 4 hours prn  #100 (One [place]) vial(s) Refills: 0           Orders:       [id number]  Infectious agent antigen detection by immunoassay; Influenza  (In-House)         86935-75  Infectious agent antigen detection by immunoassay; Influenza  (In-House)            COPD           Prescriptions:       Prednisone 10mg Tablet take 6 pills today, 5 pills tomorrow, 4 pills day 3, 3 pills day 4, 2 pills day 5 and 1 pill day 6  #21 (Twenty One) tablet(s) Refills: 0             Patient Recommendations:        For  Influenza-like syndrome:     Schedule follow-up appointments as needed.              CHARGE CAPTURE           **Please note: ICD descriptions below are intended for billing purposes only and may not represent clinical diagnoses**        Primary Diagnosis:         487.1 Influenza-like syndrome            J10.1    Influenza due to other identified influenza virus with other respiratory manifestations              Orders:          [id number]   Office/outpatient visit; established patient, level 3  (In-House)             54842   Infectious  agent antigen detection by immunoassay; Influenza  (In-House)             14821 -[id number]  Infectious agent antigen detection by immunoassay; Influenza  (In-House)           496 COPD            J44.1    Chronic obstructive pulmonary disease with (acute) exacerbation

## 2021-05-18 NOTE — PROGRESS NOTES
Preston Wallis WANDER  1965     Office/Outpatient Visit    Visit Date: Fri, Feb 28, 2020 03:51 pm    Provider: Pawan Teresa MD (Assistant: Spurling, Sarah C, MA)    Location: [facility]        Electronically signed by Pawan Teresa MD on  03/18/2020 04:54:06 PM                             Subjective:        CC: Gómez is a 54 year old White male.  Possible pneumonia, shortness of air.          HPI:       Patient presents clinic today with complaints of cough and dyspnea.  Of note, the patient has a significant pulmonary history with severe COPD.  He was admitted to the hospital in December for a week and says he was intubated during that time.  He said he was diagnosed with pneumonia and feels as though he has a similar condition right now. His current respiratory medication regimen includes Brio Ellipta daily, duo nebs as needed As listed on our clinics medication list however his most recent pulmonology note lists that his regimen includes Spiriva, Symbicort and Brio Ellipta.  Patient is on able to verify which list is correct.  He is on home oxygen with a baseline requirement of 2 L continuously.  He said that he has been requiring 3 to 4 L to not feel short of breath even at rest. Of note, patient has a history of PE/DVT/a-fib and says that he takes eliquis as prescribed without missed doses.    ROS:     CONSTITUTIONAL:  Positive for fatigue.   Negative for chills or fever.      E/N/T:  Negative for ear pain, tinnitus, nasal congestion, frequent rhinorrhea, sinus pressure and sore throat.      CARDIOVASCULAR:  Negative for chest pain, dizziness, palpitations and edema.      RESPIRATORY:  Positive for dyspnea, frequent wheezing and cough.      GASTROINTESTINAL:  Negative for abdominal pain, constipation, diarrhea, heartburn, nausea and vomiting.      NEUROLOGICAL:  Negative for headaches, paresthesias and weakness.      ENDOCRINE:  Negative for hair loss, heat/cold intolerance, polydipsia, and  polyphagia.          Past Medical History / Family History / Social History:         Last Reviewed on 3/18/2020 04:53 PM by Pawan Teresa    Past Medical History:     Use of high risk medications     MRSA pneumonia     Chronic low back pain     Closed fracture of other tarsal and metatarsal bones     Eczema     Low back pain     Type 2 diabetes     Chronic bronchitis, mucopurulent     Diabetes with neurological manifestations, type II or unspecified type, not stated as uncontrolled     Allergies     Bronchiectasis     COPD     Hypercholesterolemia     Type II DM     Hypertension     GERD     Peripheral neuropathy attributed to type II diabetes     Erectile dysfunction due to organic reasons                 PREVENTIVE HEALTH MAINTENANCE             EYE EXAM: was last done 4/24/19         Surgical History:         Cholecystectomy    Tonsillectomy/Adenoidectomy     L knee;    venous port - L chest;         Family History:         Positive for Coronary Artery Disease ( mother ) and Hypertension ( mother ).      Positive for Cancer- type not specified ( sister ).      Positive for Asthma ( father ).      Positive for Type 2 Diabetes ( mother ).          Social History:     Occupation: Disabled (due to COPD)     Marital Status:      Children: 2 children         Tobacco/Alcohol/Supplements:     Last Reviewed on 3/18/2020 04:53 PM by Pawan Teresa    Tobacco: He has a past history of cigarette smoking; quit date:  1993.  Non-drinker         Substance Abuse History:     Last Reviewed on 3/18/2020 04:53 PM by Pawan Teresa        Mental Health History:     Last Reviewed on 3/18/2020 04:53 PM by Pawan Teresa        Communicable Diseases (eg STDs):     Last Reviewed on 3/18/2020 04:53 PM by Pawan Teresa        Current Problems:     Last Reviewed on 3/18/2020 04:53 PM by Pawan Teresa    Other long term (current) drug therapy    Low back pain    Morbid (severe) obesity due to excess calories    Obstructive sleep apnea  "(adult) (pediatric)    Chronic obstructive pulmonary disease, unspecified    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 without evidence of necrosis    Major depressive disorder, recurrent, mild    Unspecified fall, initial encounter    Type 1 diabetes mellitus with diabetic chronic kidney disease    Chronic obstructive pulmonary disease with (acute) exacerbation    Fall NOS    Rash and other nonspecific skin eruption    Encounter for follow-up examination after completed treatment for conditions other than malignant neoplasm    Other forms of dyspnea    Other pulmonary embolism without acute cor pulmonale    Pneumonia, unspecified organism    Encounter for screening for depression    Essential (primary) hypertension    Anemia, unspecified    Other specified noninfective gastroenteritis and colitis    Follow-up examination    Paroxysmal atrial fibrillation    Chest pain, unspecified    Tachycardia, unspecified        Immunizations:     influenza, injectable, quadrivalent, preservative free 12/5/2019    zzFluzone pf-quadrivalent 3 and up 10/18/2016    zzFluzone pf-quadrivalent 3 and up 10/16/2017    Fluzone pf (3+ years dose) 9/19/2013    Fluzone Quadrivalent (3+ years) 10/8/2018    Tdap (Tetanus, reduced diph, acellular pertussis) 9/18/2018        Allergies:     Last Reviewed on 3/18/2020 04:53 PM by Pawan Teresaryl:      Proventil: candidiasis  (Adverse Reaction)        Current Medications:     Last Reviewed on 3/18/2020 04:53 PM by Pawan Teresa    atorvastatin 20 mg oral tablet [take 1 tablet (20 mg) by oral route once daily]    Glucose Reagent Blood Test Strips  Reagent Strips [Check  blood sugars AC HS. Dispense brand covered by insurance.e11.9]    HYDROcodone-acetaminophen  mg oral tablet [One PO BID PRN]    BD Ultra-Fine Mini Pen Needle 31 gauge x 3/16\"  [use w/ trulicity & basaglar]    omeprazole 40 mg oral capsule,delayed release (enteric coated) [Take " 1 capsule(s) by mouth daily]    albuterol sulfate 90 mcg/actuation Inhalation HFA Aerosol Inhaler [INHALE 2 PUFF(S) BY MOUTH 4 TIMES A DAY AS NEEDED]    metFORMIN 500 mg oral Tablet, Extended Release 24 hr [2T PO BID]    atorvastatin 20 mg oral tablet [TAKE 1  TABLET PO Q HS]    Breo Ellipta 200mcg/25mcg Inhalation Powder [Take 1 inhalation(s) by mouth daily]    furosemide 40 mg oral tablet [1T PO QD]    Eliquis 5mg Tablet [take 1 tab BID]    losartan 100 mg oral tablet [take 1 tablet (100 mg) by oral route once daily]    traZODone 150 mg oral tablet [take 1 tablet (150 mg) by oral route qhs]    Cymbalta 60 mg oral capsule,delayed release (enteric coated) [take 1 capsule (60 mg) by oral route once daily]    ipratropium-albuterol 0.5 mg-3 mg(2.5 mg base)/3 mL Inhalation Solution for Nebulization [inhale 3 milliliters by nebulization route 4 times per day as needed]    Lotrisone 1-0.05 % Topical Cream [apply to the affected and surrounding areas of skin by topical route 2 times per day in the morning and evening]    BASAGLAR INJ 100UNIT Milliliters  [INJECT 40 UNITS UNDER THE SKIN EACH NIGHT AT BEDTIME]    potassium chloride 10 mEq oral capsule, extended release [take 1 capsule (10 meq) daily ]    Zofran 8 mg oral tablet [ODT FORM.  ONE Q SIX HOURS PRN N/V]    insulin lispro 100 unit/mL subcutaneous Insulin Pen [inject by subcutaneous route per prescriber's instructions AC AND HS while on Steriods]    loratadine 10 mg oral tablet [TAKE 1 TABLET(S) BY MOUTH DAILY]    dilTIAZem HCl 120 mg oral Capsule, Extended Release 24 hr [TAKE 1 CAPSULE BY MOUTH EVERY DAY]    Bydureon 2 mg/0.65 mL subcutaneous Pen Injector [Inject 2 mg subcutaneously q week]    hydroCHLOROthiazide 12.5 mg oral capsule [TAKE 1 CAPSULE BY MOUTH TWICE DAILY]    Gabapentin 400 mg oral capsule [1 TAB BID]        Objective:        Vitals:         Current: 2/28/2020 3:55:52 PM    Ht:  5 ft, 9 in;  Wt: 280 lbs (Estimated);  BMI: 41.3T: 98.7 F (oral);  BP:  "134/52 mm Hg (left arm, sitting);  P: 83 bpm (left arm (BP Cuff), sitting);  sCr: 1.53 mg/dL;  GFR: 63.75O2 Sat: 92 % (2 liters O2)        Exams:     PHYSICAL EXAM:     GENERAL: Vitals recorded Chronically ill appearing;  no apparent distress;     EYES: conjunctiva and cornea are normal;     RESPIRATORY: No use of acccessory muscles; Clear to auscultation bilateally; decreased breath sounds in the throughout that is worse in the upper lung zones;  no rales (\"crackles\") present; no rhonchi; diffuse expiratory wheezes;     CARDIOVASCULAR: normal rate; rhythm is regular;  No murmurs, clicks, gallops or rubs appreciated; no edema;     SKIN:  No significant rashes, lesions or suspicious moles within limits of examination;     NEUROLOGIC: Grossly intact; mental status: alert and oriented x 3;     PSYCHIATRIC: appropriate affect and demeanor; normal speech pattern; Normal behavior;         Assessment:         J44.1   Chronic obstructive pulmonary disease with (acute) exacerbation           ORDERS:         Meds Prescribed:       [New Rx] predniSONE 10 mg oral tablet [take 5 pills for 3 days, 4 pills for 3 days, 3 pills for 3 days, 2 pills for 3 days, 1 pill for 3 days], #45 (forty five) tablets, Refills: 0 (zero)       [New Rx] doxycycline monohydrate 100 mg oral tablet [take 1 tablet (100 mg) by oral route 2 times per day], #14 (fourteen) tablets, Refills: 0 (zero)         Radiology/Test Orders:       [id number]  Radiologic exam chest 2 views  (Send-Out)                      Plan:         Chronic obstructive pulmonary disease with (acute) exacerbation- Will order chest x-ray for further evaluation and to r/o PNA.   Prednisone taper given.  Doxycycline 100 mg twice daily x7 days.  Continue current COPD regimen as prescribed by pulmonology. I advised him to contact his specialist to set up a sooner follow-up.  I am concerned about possible progression of his disease given his prior severe exacerbations requiring intubation.  As " such, strict ED/return precautions given.        RADIOLOGY:  I have ordered a chest x-ray (PA and lateral) to be done today.            Prescriptions:       [New Rx] predniSONE 10 mg oral tablet [take 5 pills for 3 days, 4 pills for 3 days, 3 pills for 3 days, 2 pills for 3 days, 1 pill for 3 days], #45 (forty five) tablets, Refills: 0 (zero)       [New Rx] doxycycline monohydrate 100 mg oral tablet [take 1 tablet (100 mg) by oral route 2 times per day], #14 (fourteen) tablets, Refills: 0 (zero)           Orders:       [id number]  Radiologic exam chest 2 views  (Send-Out)                  Charge Capture:         Primary Diagnosis:     J44.1  Chronic obstructive pulmonary disease with (acute) exacerbation           Orders:      [id number]  Office/outpatient visit; established patient, level 3  (In-House)

## 2021-05-18 NOTE — PROGRESS NOTES
Preston Wallis 1965     Office/Outpatient Visit    Visit Date: Tue, Sep 3, 2019 11:27 am    Provider: Kimmy Riley MD (Assistant: Sarah Spurling, MA)    Location: [facility]        Electronically signed by Kimmy Riley MD on  09/05/2019 10:10:28 AM                             SUBJECTIVE:        CC: shortness of breath            HPI:         PHQ-9 Depression Screening: Completed form scanned and in chart; Total Score 8 Alcohol Consumption Screening: Completed form scanned and in chart; Total Score 0     acute  COPD wtih pseudomonas positive in his sputum, he is seeing Dr Singh who started him on IV abx for past week, he completed abx yesterday and woke up today with more chest congestion and woresening SOA and FREITAS      ROS:     CONSTITUTIONAL:  Positive for fatigue ( mild ).   Negative for chills or fever.      EYES:  Negative for blurred vision.      CARDIOVASCULAR:  Negative for chest pain, palpitations and pedal edema.      RESPIRATORY:  Positive for recent cough, dyspnea ( with mild exertion ) and frequent wheezing.   Negative for pleuritic chest pain.      GASTROINTESTINAL:  Negative for abdominal pain, constipation, diarrhea, nausea and vomiting.      MUSCULOSKELETAL:  Positive for back pain.   Negative for arthralgias or myalgias.      INTEGUMENTARY:  Negative for rash.      NEUROLOGICAL:  Negative for headaches, memory loss and weakness.          PMH/FMH/SH:     Last Reviewed on 9/03/2019 11:37 AM by Kimmy Riley    Past Medical History:     Use of high risk medications     MRSA pneumonia     Chronic low back pain     Closed fracture of other tarsal and metatarsal bones     Eczema     Low back pain     Type 2 diabetes     Chronic bronchitis, mucopurulent     Diabetes with neurological manifestations, type II or unspecified type, not stated as uncontrolled     Allergies     Bronchiectasis     COPD     Hypercholesterolemia     Type II DM     Hypertension     GERD     Peripheral  neuropathy attributed to type II diabetes     Erectile dysfunction due to organic reasons                 PREVENTIVE HEALTH MAINTENANCE             EYE EXAM: was last done 4/24/19         Surgical History:         Tonsillectomy/Adenoidectomy      L knee;    venous port - L chest;         Family History:         Positive for Coronary Artery Disease ( mother ) and Hypertension ( mother ).      Positive for Cancer- type not specified ( sister ).      Positive for Asthma ( father ).      Positive for Type 2 Diabetes ( mother ).          Social History:     Occupation: Disabled (due to COPD)     Marital Status:      Children: 2 children         Tobacco/Alcohol/Supplements:     Last Reviewed on 9/03/2019 11:38 AM by Kimmy Riley    Tobacco: He has a past history of cigarette smoking; quit date:  1993.  Non-drinker         Substance Abuse History:     Last Reviewed on 5/22/2013 03:43 PM by Jorgito Ames            Allergies:     Last Reviewed on 8/06/2019 03:20 PM by Olesya Garcíaryl:    Proventil: candidiasis (Adverse Reaction)        Current Medications:     Last Reviewed on 8/06/2019 03:24 PM by Olesya García Ellipta 200mcg/25mcg Inhalation Powder Take 1 inhalation(s) by mouth daily     Cardizem CD  120mg Capsules, Extended Release Take 1 capsule(s) by mouth daily     Cymbalta 30mg Capsules, Delayed Release 1 capsule daily     Hydrochlorothiazide (HCTZ) 12.5mg Tablet Take 1 tablet(s) by mouth bid     Hydrocodone/Acetaminophen 10mg/325mg Tablet One PO BID PRN     Loratadine 10mg Tablet Take 1 tablet(s) by mouth daily     Losartan 50mg Tablet Take 1 tablet(s) by mouth bid     Metformin HCl 500mg Tablets, Extended Release 2 po bid     Omeprazole 40mg Capsules, Extended Release Take 1 capsule(s) by mouth daily     Trazodone HCl 100mg Tablet 1 tab HS     Glucose Reagent Blood Test Strips  Reagent Strips Check  blood sugars AC HS. Dispense brand covered by insurance.e11.9     Ventolin  HFA 90mcg/1actuation Oral Inhaler Inhale 2 puff(s) by mouth 4 times a day as needed     Levaquin 500mg Tablet 1 tab daily x 10 days     Trulicity PEN 1.5mg/0.5ml Injection inject 1.5mg once a week     Atorvastatin Calcium 20mg Tablet 1 po q hs     Basaglar KwikPen 100units/1ml Injection inject 35units daily  DX  E11.9     Eliquis 5mg Tablet take 1 tab BID     Furosemide 40mg Tablets 1 by mouth  daily     Gabapentin 400mg Capsules 1 TAB BID     Metoprolol 50mg Tablet 1 tab bid     Potassium Chloride 10mEq Capsules, Extended Release 1 daily prn with lasix use         OBJECTIVE:        Vitals:         Current: 9/3/2019 11:33:45 AM    Ht:  5 ft, 9 in;  Wt: 284.6 lbs;  BMI: 42.0    T: 97.5 F (oral);  BP: 140/57 mm Hg (left arm, sitting);  P: 84 bpm (left arm (BP Cuff), sitting);  sCr: 0.91 mg/dL;  GFR: 107.92    O2 Sat: 85 % (room air)        Exams: wt is down 9#'s     PHYSICAL EXAM:     GENERAL: Vitals recorded well groomed;  no apparent distress;     E/N/T: EARS:  normal external auditory canals and tympanic membranes;  grossly normal hearing; NOSE:  normal nasal mucosa, septum, turbinates, and sinuses; OROPHARYNX:  normal mucosa, dentition, gingiva, and posterior pharynx;     NECK:  supple, full ROM; no thyromegaly; no carotid bruits;     RESPIRATORY: normal appearance and symmetric expansion of chest wall; normal respiratory rate and pattern with no distress; rales heard throughout; expiratory wheezes;     CARDIOVASCULAR: normal rate; rhythm is regular;  no systolic murmur; no edema;     NEUROLOGIC: mental status: oriented to person, place, and time;  GROSSLY INTACT     PSYCHIATRIC: affect/demeanor: flat;  normal psychomotor function; speech pattern: flat;  normal thought and perception;         Procedures:     Shortness of breath         Nebulizer: Medication: ipratropium bromide 0.5mg and albuterol sulfate 2.5mg 1 # of Treatments treatments were performed on patient. Pre-treatment Pulse: [enter pulse rate]; O2 Sat:  96 Post-treatment O2 Sat: 90 Lot# [id number]  Expiration: june 2020 Was treatment tolerated? yes; Administered by: ael             ASSESSMENT           V79.0   Z13.31  Screening for depression              DDx:     491.21   J44.1  Acute exacerbation of chronic obstructive pulmonary disease (COPD)              DDx:     786.05   R06.02  Shortness of breath              DDx:     782.3   R60.0  Lower limb edema              DDx:     799.02   R09.02  Hypoxemia              DDx:     482.1   J15.1  Bacterial pneumonia, due to Pseudomonas              DDx:         ORDERS:         Meds Prescribed:       Tobramycin 28mg Inhalant Capsule Inhale contents of 4 capsule(s) bid for 28 days using the Pohaler device.  #1 (One) kit(s) Refills: 0       Cefepime HCl 1gm Powder for Injection 1 gram IV bid X 7 days  #14 (Fourteen) vial Refills: 0         Radiology/Test Orders:       [id number]  Radiologic exam chest 2 views  (Send-Out; Stat)           Lab Orders:       [id number]  BMP - [facility] Basic Metabolic Panel  (Send-Out)         03597  BDCB2 - [facility] CBC w/o diff  (Send-Out; Stat)         37466  NTBNP - [facility] B-Type Natriurectic peptide  (Send-Out)         59017  SPUTC - [facility] Sputum Culture  (Send-Out)           Procedures Ordered:       30989  Pressureized or nonpressurized inhalation treatment for acute airway obstruction or for sputum induc  (In-House)           Other Orders:       13554  Noninvasive ear or pulse oximetry for oxygen saturation; single determination  (In-House)           Depression screen positive and follow up plan documented  (In-House)           DuoNeb (Ipratropium) unit does for nebulizer  (Send-Out)         87229  Noninvasive ear or pulse oximetry for oxygen saturation; multiple determinations  (In-House)           Albuterol, 2.5mg & ipratropium bromide, 0.5 mg, FDA final, non-compound admin DME (x1)                 PLAN:          Screening for depression     MIPS PHQ-9 Depression Screening: Completed form scanned  and in chart; Total Score 8 Positive Depression Screen: Stable on medications. No suicidal ideation.            Orders:         Depression screen positive and follow up plan documented  (In-House)            Acute exacerbation of chronic obstructive pulmonary disease (COPD) cystic bronchiectasis on CXR with stable VS and normal WBC, his sputum was positive for pseudomonas and has been on cefipime 1 gram bid-I d/w Dr Singh his current resp status including his O2 sats 85-88% and he agrees pt is ok to stay out of the hospital and continue to treat him with O2 NC 2 liters, home health of ongoing IV cefipime, pt is to start his duonebs QID again and I will also add the tobramycin via inhalation     LABORATORY:  Labs ordered to be performed today include basic metabolic panel and CBC W/O DIFF.      RADIOLOGY:  I have ordered a chest x-ray (PA and lateral) to be done today.            Prescriptions:       Tobramycin 28mg Inhalant Capsule Inhale contents of 4 capsule(s) bid for 28 days using the Pohaler device.  #1 (One) kit(s) Refills: 0       Cefepime HCl 1gm Powder for Injection 1 gram IV bid X 7 days  #14 (Fourteen) vial Refills: 0           Orders:       [id number]  BMP - [facility] Basic Metabolic Panel  (Send-Out)         91329  BDCB2 - [facility] CBC w/o diff  (Send-Out; Stat)         69428  Radiologic exam chest 2 views  (Send-Out; Stat)            Shortness of breath     LABORATORY:  Labs ordered to be performed today include BNP.            Orders:       15909  NTBNP - [facility] B-Type Natriurectic peptide  (Send-Out)           DuoNeb (Ipratropium) unit does for nebulizer  (Send-Out)         29216  Pressureized or nonpressurized inhalation treatment for acute airway obstruction or for sputum induc  (In-House)                     Albuterol, 2.5mg & ipratropium bromide, 0.5 mg, FDA final, non-compound admin DME (x1)       57290  Noninvasive ear or pulse oximetry for oxygen saturation; multiple determinations  (In-House)             Lower limb edema increase lasix for 3 days only          Hypoxemia will start him on 2 liters NC, he is 85-88% on RA at rest, 90-92% on 2 liters in our office          Bacterial pneumonia, due to Pseudomonas add tobramycin inhalent, cont cefepime, he has f/u appt with Dr Singh next week, he knows he can go to ER or be seen here asap if he worsens.  Will recheck a sputum cx     LABORATORY:  Labs ordered to be performed today include sputum culture.            Orders:       [id number]  [facility] Sputum Culture  (Send-Out)               Other Orders:       50598  Noninvasive ear or pulse oximetry for oxygen saturation; single determination  (In-House)           CHARGE CAPTURE           **Please note: ICD descriptions below are intended for billing purposes only and may not represent clinical diagnoses**        Primary Diagnosis:         V79.0 Screening for depression            Z13.31    Encounter for screening for depression              Orders:          52104   Office/outpatient visit; established patient, level 4  (In-House)                Depression screen positive and follow up plan documented  (In-House)           491.21 Acute exacerbation of chronic obstructive pulmonary disease (COPD)            J44.1    Chronic obstructive pulmonary disease with (acute) exacerbation    786.05 Shortness of breath            R06.02    Shortness of breath              Orders:          22227   Pressureized or nonpressurized inhalation treatment for acute airway obstruction or for sputum induc  (In-House)                                           Albuterol, 2.5mg & ipratropium bromide, 0.5 mg, FDA final, non-compound admin DME (x1)           71583   Noninvasive ear or pulse oximetry for oxygen saturation; multiple determinations  (In-House)           782.3 Lower limb edema            R60.0    Localized edema    799.02 Hypoxemia            R09.02    Hypoxemia    482.1 Bacterial pneumonia, due to Pseudomonas             J15.1    Pneumonia due to Pseudomonas        Other Orders:           07585   Noninvasive ear or pulse oximetry for oxygen saturation; single determination  (In-House)

## 2021-05-18 NOTE — PROGRESS NOTES
Preston Wallis  1965     Office/Outpatient Visit    Visit Date: Thu, Jan 7, 2021 11:19 am    Provider: Kimmy Riley MD (Assistant: Yessi Stockton MA)    Location: [facility]        Electronically signed by Kimmy Riley MD on  01/11/2021 02:49:25 PM                             Subjective:        CC: dox video [phone]    HPI:       I am seeing Gómez today for an acute rash over past  days on his mid back that is very itchy, he has tried topical antihistamine with minimal relief, only new medication is inhaled tobramycin and he is on this every other month and never had issues with it, no changes with his soaps or clothes detergents.  No fever or covid symptoms.  His COPD is stable today    ROS:     CONSTITUTIONAL:  Negative for chills, fatigue, fever, and weight change.      CARDIOVASCULAR:  Negative for chest pain, palpitations, tachycardia, orthopnea, and edema.      RESPIRATORY:  Positive for recent cough and frequent wheezing.      GASTROINTESTINAL:  Negative for abdominal pain, constipation, diarrhea, nausea and vomiting.      INTEGUMENTARY:  Positive for rash.      NEUROLOGICAL:  Negative for dizziness, memory loss, paresthesias, tremor and weakness.      PSYCHIATRIC:  Negative for anxiety, depression, and sleep disturbances.          Past Medical History / Family History / Social History:         Last Reviewed on 1/07/2021 12:05 PM by Kimmy Riley    Past Medical History:     Use of high risk medications     MRSA pneumonia     Chronic low back pain     Closed fracture of other tarsal and metatarsal bones     Eczema     Low back pain     Type 2 diabetes     Chronic bronchitis, mucopurulent     Diabetes with neurological manifestations, type II or unspecified type, not stated as uncontrolled     Allergies     Bronchiectasis     COPD     Hypercholesterolemia     Type II DM     Hypertension     GERD     Peripheral neuropathy attributed to type II diabetes      "Erectile dysfunction due to organic reasons                 PREVENTIVE HEALTH MAINTENANCE             EYE EXAM: was last done 4/24/19         Surgical History:         Cholecystectomy    Tonsillectomy/Adenoidectomy     L knee;    venous port - L chest;         Family History:         Positive for Coronary Artery Disease ( mother ) and Hypertension ( mother ).      Positive for Cancer- type not specified ( sister ).      Positive for Asthma ( father ).      Positive for Type 2 Diabetes ( mother ).          Social History:     Occupation: Disabled (due to COPD)     Marital Status:      Children: 2 children         Tobacco/Alcohol/Supplements:     Last Reviewed on 1/07/2021 11:21 AM by Yessi Stockton    Tobacco: He has a past history of cigarette smoking; quit date:  1993.  Non-drinker         Substance Abuse History:     Last Reviewed on 4/09/2020 12:12 PM by Pawan Teresa        Mental Health History:     Last Reviewed on 4/09/2020 12:12 PM by Pawan Teresa        Communicable Diseases (eg STDs):     Last Reviewed on 4/09/2020 12:12 PM by Pawan Teresa        Allergies:     Last Reviewed on 12/23/2020 02:54 PM by Yessi Stockton    Benadryl:      Proventil: candidiasis  (Adverse Reaction)        Current Medications:     Last Reviewed on 1/07/2021 11:21 AM by Yessi Stockton    aspirin 81 mg oral tablet, delayed release (enteric coated) [take 1 tablet (81 mg) by oral route once daily]    Symbicort 160-4.5 mcg/actuation Inhalation HFA Aerosol Inhaler [inhale 2 puffs by inhalation route 2 times per day in the morning and evening]    loratadine 10 mg oral tablet [take 1 tablet (10 mg) by oral route once daily]    ipratropium-albuteroL 0.5 mg-3 mg(2.5 mg base)/3 mL Inhalation Solution for Nebulization [inhale 3 milliliters by nebulization route 4 times per day]    Accu-Chek SmartView Test Strips  [CHECK BLOOD SUGAR BEFORE MEALS AND AT BEDTIME]    BD Ultra-Fine Mini Pen Needle 31 gauge x 3/16\"  [use w/ trulicity & " basaglar]    omeprazole 40 mg oral capsule,delayed release (enteric coated) [TAKE 1 CAPSULE(S) BY MOUTH DAILY]    albuterol sulfate 90 mcg/actuation Inhalation HFA Aerosol Inhaler [INHALE 2 PUFF(S) BY MOUTH 4 TIMES A DAY AS NEEDED]    Bydureon 2 mg/0.65 mL subcutaneous Pen Injector [Inject 2 mg subcutaneously q week]    furosemide 40 mg oral tablet [TAKE 1 TABLET BY MOUTH EVERY DAY]    Eliquis 5 mg oral tablet [TAKE 1 TABLET BY MOUTH TWICE DAILY]    DULoxetine 60 mg oral capsule,delayed release (enteric coated) [TAKE 1 CAPSULE BY MOUTH TWICE DAILY]    Basaglar KwikPen U-100 Insulin 100 unit/mL (3 mL) subcutaneous Insulin Pen [INJECT 63 UNITS UNDER THE SKIN EACH morning]    Calcitrate 200 mg (950 mg) oral tablet [1T PO QD]    magnesium oxide 400 mg (241.3 mg magnesium) oral tablet [TAKE ONE TABLET THREE TIMES DAILY]    ferrous sulfate 325 mg (65 mg iron) oral tablet [take 1 tablet (325 mg) by oral route once daily]    lancets  [check blood sugar ac and hs E11.9]    blood pressure monitor kit  [patient to monitor b/p twice daily DX I 10]    atorvastatin 20 mg oral tablet [TAKE 1 TABLET BY MOUTH EVERY AT BEDTIME]    traZODone 50 mg oral tablet [take 1 tablet (50 mg) by oral route qhs]    metoprolol tartrate 100 mg oral tablet [take 2 tablets by oral route in the am and one tablet in the PM]    dilTIAZem HCl 120 mg oral Capsule, Extended Release 24 hr [take 1 capsule (120 mg) by oral route once daily]    hydrALAZINE 25 mg oral tablet [take 1 tablet (25 mg) by oral route 2 times per day with food]    tobramycin 28 mg Inhalation Capsule, With Inhalation Device [inhale the contents of 4 capsules (112 mg) by inhalation route every 12 hours for 28 days]    Admelog SoloStar U-100 Insulin 100 unit/mL subcutaneous Insulin Pen [CHECK BEFORE MEALS AND AT BEDTIME . IF BLOOD SUGAR < 150 -0 UNITS 151-180=1 UNITS, 181-210 =2 UNITS, 211-240 =3 UNITS, 241-270= 4 UNITS, 271-300=5 UNITS 301-330= 6 UNITS 331-390= 8 UNITS, >390 GIVE 9  UNITS]        Objective:        Exams:     PHYSICAL EXAM:     GENERAL: Vitals recorded well developed, well nourished;  well groomed;  no apparent distress;     EYES: PERRL, EOMI     RESPIRATORY: normal respiratory rate and pattern with no distress;     NEUROLOGIC: mental status: oriented to person, place, and time;  GROSSLY INTACT     PSYCHIATRIC:  appropriate affect and demeanor; normal speech pattern; grossly normal memory;     skin-20 cm erythematous eczematous rash on his back-very pruritic        Assessment:         L23.9   Allergic contact dermatitis, unspecified cause       J44.9   Chronic obstructive pulmonary disease, unspecified       E10.22   Type 1 diabetes mellitus with diabetic chronic kidney disease           ORDERS:         Meds Prescribed:       [Recorded] predniSONE 20 mg oral tablet [2 po daily x 4 days, then 1 po daily x 4 days then 1/2 po daily x 4 days.]       [Recorded] cetirizine 10 mg oral tablet [take 1 tablet (10 mg) by oral route once daily]       [Refilled] predniSONE 20 mg oral tablet [2 po daily x 4 days, then 1 po daily x 4 days then 1/2 po daily x 4 days.], #14 (fourteen) tablets, Refills: 0 (zero)       [Refilled] cetirizine 10 mg oral tablet [take 1 tablet (10 mg) by oral route once daily], #30 (thirty) tablets, Refills: 0 (zero)                 Plan:         Allergic contact dermatitis, unspecified causehe is to change his soap to moisturinzing, change his detergent to dye and perfume free, take luke warm showers and pat dry, start aveeno lotion bid  and I will start him on a steroid dose shahram with zyrtec, if he is not any better he will come in next week for face to face eval, I am concerned about an allergic tx to his inhaled tobramycin, he is to call if his rash gets wose and go to ER is signs/symptoms of SOA or throat swelling    Telehealth: Verbal consent obtained for visit to occur via televideo conferencing; Staff, other than provider, present during telephone visit include  only Dr Bourne was present during the telehealth OV with patient           Prescriptions:       [Recorded] predniSONE 20 mg oral tablet [2 po daily x 4 days, then 1 po daily x 4 days then 1/2 po daily x 4 days.]       [Recorded] cetirizine 10 mg oral tablet [take 1 tablet (10 mg) by oral route once daily]       [Refilled] predniSONE 20 mg oral tablet [2 po daily x 4 days, then 1 po daily x 4 days then 1/2 po daily x 4 days.], #14 (fourteen) tablets, Refills: 0 (zero)       [Refilled] cetirizine 10 mg oral tablet [take 1 tablet (10 mg) by oral route once daily], #30 (thirty) tablets, Refills: 0 (zero)         Chronic obstructive pulmonary disease, unspecifiedstable, cont tobramycin and pulm toilet        Type 1 diabetes mellitus with diabetic chronic kidney diseasehis BS may get worse, he is to call if this occurs, cont current meds            Charge Capture:         Primary Diagnosis:     L23.9  Allergic contact dermatitis, unspecified cause           Orders:      99730  Office/outpatient visit; established patient, level 4  (In-House)              J44.9  Chronic obstructive pulmonary disease, unspecified     E10.22  Type 1 diabetes mellitus with diabetic chronic kidney disease

## 2021-05-18 NOTE — PROGRESS NOTES
"Preston Wallis. 1965     Office/Outpatient Visit    Visit Date: Wed, Sep 18, 2019 02:54 pm    Provider: Pawan Teresa MD (Assistant: Olesya García MA)    Location: [facility]        Electronically signed by Pawan Teresa MD on  09/18/2019 09:25:14 PM                             SUBJECTIVE:        CC: pt went outside this morning on porch and fell.. hurt his right side PT STATES INSURANCE DIDNT COVER TOBRAMYCIN pt doesnt really know what he is taking         HPI:     Patient reports that approximately 10:30 AM this morning he was walking onto his porch when he fell onto his right side onto a stack of 2 by fours. During the fall, he struck his right rib cage and right side of his chest.   He did not have diaphoresis, syncope, loss of consciousness or dizziness lightheadedness prior to falling.  He did not hit his head during the fall. He attributes the fall to his severe peripheral neuropathy in his bilateral feet.  As he says, \"I cannot feel anything in my feet at all.\" Immediately after the fall, he has endorsed right rib pain and right-sided chest pain.  He denies bruising or swelling. He notes that he has a history of a right shoulder fracture at that this is not currently painful.  He has no prior history of rib fractures on the right side.  He denies shortness of breath above baseline (he has a history of severe COPD). Of note, patient reports that he falls often.  However, these are usually minor falls nand leave him withour any lingering pain or soreness.     See above     As stated above, patient reports that he \"cannot feel anything in his feet.\"  His current regimen includes gabapentin 400 mg twice daily.  He does think that this helps with pain related to neuropathy. Reports that his right foot is broken and has never healed from an injury several years ago.  However, he denies any ulcerations or wounds on his feet bilaterally.         Hypertension details; his current cardiac " medication regimen includes a diuretic ( Lasix 40 mg daily and hydrochlorothiazide 12.5 mg daily ), a beta-blocker ( Metoprolol 50 mg twice daily ), a calcium channel blocker ( Cardizem 120 mg daily ), and an angiotensin receptor blocker ( Losartan 50 mg daily ).  Compliance with treatment has been fair; he Patient reports good compliance but cannot name his medications or how he takes them..  He is tolerating the medication well without side effects.  Gómez does not check his blood pressure other than at his clinic appointments.  Patient reports that his blood pressure has been running normal to slightly high at home on his current regimen.  However, he was borderline low upon presentation today.  He does not recall this happening before.  He denies dizziness or lightheadedness.  No tachycardia, chest pain or palpitations.  Bilateral lower extremity edema is stable.      ROS:         EYES:  Negative for blurred vision.      CARDIOVASCULAR:  Positive for chest pain and edema (at baseline).   Negative for dizziness, palpitations or tachycardia.      RESPIRATORY:  Positive for chronic cough and dyspnea.      MUSCULOSKELETAL:  Negative for right sided rib pain.      INTEGUMENTARY:  Negative for bruising.      NEUROLOGICAL:  Positive for paresthesia ( right lower extremity; left lower extremity ).   Negative for headaches or weakness.      PSYCHIATRIC:  Negative for anxiety, depression, and sleep disturbances.          PMH/FMH/SH:     Last Reviewed on 9/18/2019 08:33 PM by Pawan Teresa    Past Medical History:     Use of high risk medications     MRSA pneumonia     Chronic low back pain     Closed fracture of other tarsal and metatarsal bones     Eczema     Low back pain     Type 2 diabetes     Chronic bronchitis, mucopurulent     Diabetes with neurological manifestations, type II or unspecified type, not stated as uncontrolled     Allergies     Bronchiectasis     COPD     Hypercholesterolemia     Type II DM      Hypertension     GERD     Peripheral neuropathy attributed to type II diabetes     Erectile dysfunction due to organic reasons                 PREVENTIVE HEALTH MAINTENANCE             EYE EXAM: was last done 4/24/19         Surgical History:         Tonsillectomy/Adenoidectomy      L knee;    venous port - L chest;         Family History:         Positive for Coronary Artery Disease ( mother ) and Hypertension ( mother ).      Positive for Cancer- type not specified ( sister ).      Positive for Asthma ( father ).      Positive for Type 2 Diabetes ( mother ).          Social History:     Occupation: Disabled (due to COPD)     Marital Status:      Children: 2 children         Tobacco/Alcohol/Supplements:     Last Reviewed on 9/18/2019 08:33 PM by Pawan Teresa    Tobacco: He has a past history of cigarette smoking; quit date:  1993.  Non-drinker         Substance Abuse History:     Last Reviewed on 9/18/2019 08:33 PM by Pawan Teresa        Mental Health History:     Last Reviewed on 9/18/2019 08:33 PM by Pawan Teresa        Communicable Diseases (eg STDs):     Last Reviewed on 9/18/2019 08:33 PM by Pawan Teresa            Current Problems:     Last Reviewed on 9/18/2019 08:33 PM by Pawan Teresa    Fall NOS     Hypoxemia     Use of high risk medications     Essential hypertension, benign     Major depression, recurrent episode, mild     Dehydration     MRSA pneumonia     Acute exacerbation of chronic obstructive pulmonary disease (COPD)     Foot ulcer     Hypertension     Decompensated chronic obstructive pulmonary disease (COPD)     Dizziness     IDDM     Decompensated chronic obstructive pulmonary disease (COPD) with exacerbation     Light-headedness     Obstructive sleep apnea     Chronic bronchitis, obstructive, with (acute) exacerbation     Microscopic hematuria     Severe obesity     Unspecified closed foot fracture     Ulcer of toes     Chronic low back pain     Eczema     Diabetes with neurological  manifestations, type II or unspecified type, not stated as uncontrolled     Allergies     Bronchiectasis     COPD     Hypercholesterolemia     GERD     Peripheral neuropathy attributed to type II diabetes     Erectile dysfunction due to organic reasons     Rib pain     Bacterial pneumonia, due to Pseudomonas     Lower limb edema     Shortness of breath     Acute respiratory distress, NEC     Screening for depression     Hypotension, other     Follow-up examination         Immunizations:     zzFluzone pf-quadrivalent 3 and up 10/18/2016     zzFluzone pf-quadrivalent 3 and up 10/16/2017     Fluzone pf (3+ years dose) 9/19/2013     Fluzone Quadrivalent (3+ years) 10/8/2018     Tdap (Tetanus, reduced diph, acellular pertussis) 9/18/2018         Allergies:     Last Reviewed on 9/18/2019 08:33 PM by Pawan Teresa:    Proventil: candidiasis (Adverse Reaction)        Current Medications:     Last Reviewed on 9/18/2019 08:33 PM by Pawan Teresa    Hydrocodone/Acetaminophen 10mg/325mg Tablet One PO BID PRN     Breo Ellipta 200mcg/25mcg Inhalation Powder Take 1 inhalation(s) by mouth daily     Cardizem CD  120mg Capsules, Extended Release Take 1 capsule(s) by mouth daily     Cymbalta 30mg Capsules, Delayed Release 1 capsule daily     Hydrochlorothiazide (HCTZ) 12.5mg Tablet Take 1 tablet(s) by mouth bid     Loratadine 10mg Tablet Take 1 tablet(s) by mouth daily     Losartan 50mg Tablet Take 1 tablet(s) by mouth bid     Metformin HCl 500mg Tablets, Extended Release 2 po bid     Omeprazole 40mg Capsules, Extended Release Take 1 capsule(s) by mouth daily     Trazodone HCl 100mg Tablet 1 tab HS     Glucose Reagent Blood Test Strips  Reagent Strips Check  blood sugars AC HS. Dispense brand covered by insurance.e11.9     Ventolin HFA 90mcg/1actuation Oral Inhaler Inhale 2 puff(s) by mouth 4 times a day as needed     Tobramycin 28mg Inhalant Capsule Inhale contents of 4 capsule(s) bid for 28 days using the Pohaler device.  "    Trulicity PEN 1.5mg/0.5ml Injection inject 1.5mg once a week     Basaglar KwikPen 100units/1ml Injection inject 35units daily  DX  E11.9     Eliquis 5mg Tablet take 1 tab BID     Furosemide 40mg Tablets 1 by mouth  daily     Gabapentin 400mg Capsules 1 TAB BID     Metoprolol 50mg Tablet 1 tab bid     Potassium Chloride 10mEq Capsules, Extended Release 1 daily prn with lasix use     Cefepime 1 gram IV  BID times 7 days         OBJECTIVE:        Vitals:         Current: 9/18/2019 3:07:58 PM    Ht:  5 ft, 9 in;  Wt: 284 lbs (Estimated);  BMI: 41.9    T: 98.4 F (oral);  BP: 104/48 mm Hg (left arm, sitting);  P: 85 bpm (left arm (BP Cuff), sitting);  sCr: 1.11 mg/dL;  GFR: 88.40        Repeat:     3:09:23 PM     BP:   111/41mm Hg (left arm, sitting, P-83)         Exams:     PHYSICAL EXAM:     GENERAL: Vitals recorded morbidly obese;  no apparent distress;     EYES: conjunctiva and cornea are normal; PERRL, EOMI     RESPIRATORY: coarse breath sounds throughout; no rales (\"crackles\") present; no rhonchi; (+) expiratory wheezes;     CARDIOVASCULAR: normal rate; rhythm is regular;  No murmurs, clicks, gallops or rubs appreciated; no edema;     SKIN: No bruising of the chest wall or flank;     MUSCULOSKELETAL: Tenderness to palpation of the seventh eighth and ninth ribs.  Axillary line as well as tenderness to palpation of the second third and fourth ribs in the midclavicular line; No palpable abnormalities identified     NEUROLOGIC: mental status: alert and oriented x 3; Grossly intact;     PSYCHIATRIC: appropriate affect and demeanor; normal speech pattern; Normal behavior;         ASSESSMENT           E888.9   W19.XXXA  Fall NOS              DDx:     786.50   R07.82  Rib pain              DDx: Post fall pain versus intercostal strain versus rib fracture     250.60   E10.49  Peripheral neuropathy attributed to type II diabetes - Chronic              DDx:     401.1   I10  Hypertension - Borderline low today; BP " 104-111/41-48              DDx:         ORDERS:         Radiology/Test Orders:       [id number]  Radiologic exam chest 2 views  (Send-Out)         [id number]  Radiologic exam, ribs, right; 3 views  (Send-Out)           Lab Orders:       APPTO  Appointment need  (In-House)                   PLAN:          Fall NOS -See plan for rib pain below          Rib pain -Imaging ordered to evaluate for fractures; chest x-ray with right rib views.  Patient advised to continue his already prescribed pain regimen which includes hydrocodone/acetaminophen 10 mg / 325 mg twice daily as needed and gabapentin 400 mg twice daily. Further management will be based on imaging results.  If pain persists or worsens, patient advised to return to clinic.  Otherwise, he will follow-up with his PCP at first available appointment         RADIOLOGY:  I have ordered CXR 2 view with PA and Right Unilateral Rib 2 view Chest XRAY Unilateral Rib to be done today.      FOLLOW-UP: Schedule a follow-up appointment in 2 weeks.:.:for chronic disease follow up Schedule with PCP           Orders:       [id number]  Radiologic exam chest 2 views  (Send-Out)         [id number]  Radiologic exam, ribs, right; 3 views  (Send-Out)         APPTO  Appointment need  (In-House)            Peripheral neuropathy attributed to type II diabetes -Continue gabapentin 40 mg twice daily.  Reviewed fall precautions with patient such as limiting rugs, clot or and loose items in his home as well as using ambulatory assist devices such as a cane, walker or wheelchair.          Hypertension -Continue current medication regimen of losartan 50 mg daily, metoprolol 50 mg BID, HCTZ 12.5 mg daily, lasix 40 mg daily and Cardizem 120 mg daily. Patient advised to check BP at home every few days and to alert clinic for consistent BP < 100/60.  If hypotension occurs consistently, his regimen will likely have to be adjusted.             Patient Recommendations:        For  Rib pain:     Schedule a follow-up  visit in 2 weeks.                APPOINTMENT INFORMATION:        Monday Tuesday Wednesday Thursday Friday Saturday Sunday            Time:___________________AM  PM   Date:_____________________             CHARGE CAPTURE           **Please note: ICD descriptions below are intended for billing purposes only and may not represent clinical diagnoses**        Primary Diagnosis:         E888.9 Fall NOS            W19.XXXA    Unspecified fall, initial encounter              Orders:          [id number]   Office/outpatient visit; established patient, level 4  (In-House)           786.50 Rib pain            R07.82    Intercostal pain              Orders:          APPTO   Appointment need  (In-House)           250.60 Peripheral neuropathy attributed to type II diabetes            E10.49    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    401.1 Hypertension            I10    Essential (primary) hypertension

## 2021-05-18 NOTE — PROGRESS NOTES
Preston Wallis 1965     Office/Outpatient Visit    Visit Date: Thu, Mar 28, 2019 11:36 am    Provider: Kimmy Riley MD (Assistant: Lorin Lambert MA)    Location: [facility]        Electronically signed by Kimmy Riley MD on  03/30/2019 11:16:09 AM                             SUBJECTIVE:        CC: controlled med refills         HPI:         Gómez is following up for his chronic LBP med of  hydrocodone 10/325 bid and this med makes him functional and he is able to cope with his pain on this med.  He is c/o increased PN pain in feet B and wanting to get back on the gabapentin. He, tolerates meds well and denies confusion/weakness.  He is on permanent disability.  No signs of abuse or diversion.         Concerning hypertension, his current cardiac medication regimen includes an angiotensin receptor blocker ( Cozaar ).  He is tolerating the medication well without side effects.  Compliance with treatment has been good; he takes his medication as directed and follows up as directed.      Gómez is now on basaglar 35 units daily and his BS are running great and he is feeling a lot better.     ROS:     CONSTITUTIONAL:  Positive for fatigue.   Negative for chills or fever.      CARDIOVASCULAR:  Negative for chest pain, palpitations and pedal edema.      RESPIRATORY:  Positive for frequent wheezing.   Negative for recent cough, chronic cough, dyspnea or pleuritic chest pain.      GASTROINTESTINAL:  Negative for abdominal pain, constipation, diarrhea, hematochezia, melena, nausea and vomiting.      MUSCULOSKELETAL:  Positive for back pain.   Negative for arthralgias or myalgias.      INTEGUMENTARY:  Negative for rash.      NEUROLOGICAL:  Negative for headaches, memory loss and weakness.          PMH/FMH/SH:     Last Reviewed on 2/27/2019 04:37 PM by Kimmy Riley    Past Medical History:     Use of high risk medications     MRSA pneumonia     Chronic low back pain     Closed fracture of other  tarsal and metatarsal bones     Eczema     Low back pain     Type 2 diabetes     Chronic bronchitis, mucopurulent     Diabetes with neurological manifestations, type II or unspecified type, not stated as uncontrolled     Allergies     Bronchiectasis     COPD     Hypercholesterolemia     Type II DM     Hypertension     GERD     Peripheral neuropathy attributed to type II diabetes     Erectile dysfunction due to organic reasons             Surgical History:         Tonsillectomy/Adenoidectomy      L knee;    venous port - L chest;         Family History:         Positive for Coronary Artery Disease ( mother ) and Hypertension ( mother ).      Positive for Cancer- type not specified ( sister ).      Positive for Asthma ( father ).      Positive for Type 2 Diabetes ( mother ).          Social History:     Occupation: Disabled (due to COPD)     Marital Status:      Children: 2 children         Tobacco/Alcohol/Supplements:     Last Reviewed on 2/27/2019 04:37 PM by Kimmy Riley    Tobacco: He has a past history of cigarette smoking; quit date:  1993.  Non-drinker         Substance Abuse History:     Last Reviewed on 5/22/2013 03:43 PM by Jorgito Ames            Allergies:     Last Reviewed on 2/27/2019 03:20 PM by Spurling, Sarah C    Benadryl:    Proventil: candidiasis (Adverse Reaction)        Current Medications:     Last Reviewed on 2/27/2019 03:26 PM by Spurling, Sarah C    Basaglar KwikPen 100units/1ml Injection inject 35units at hs Dx  E11.9     Hydrocodone/Acetaminophen 10mg/325mg Tablet One PO BID PRN     Losartan 50mg Tablet Take 1 tablet(s) by mouth bid     Glucose Reagent Blood Test Strips  Reagent Strips Check  blood sugars AC HS. Dispense brand covered by insurance.e11.9     Amitriptyline HCl 25mg Tablet Take 1 tablet(s) by mouth at bedtime     Loratadine 10mg Tablet Take 1 tablet(s) by mouth daily     Omeprazole 40mg Capsules, Extended Release Take 1 capsule(s) by mouth daily      "Trazodone HCl 100mg Tablet 1 tab HS     Bydureon 2mg/1pen Injection Suspension, Extended-Release Inject 2 mg subcutaneously q week     Cardizem CD  120mg Capsules, Extended Release Take 1 capsule(s) by mouth daily     Metformin HCl 500mg Tablets, Extended Release 2 po bid     ProAir HFA 90mcg/1actuation Oral Inhaler Inhale 2 puff(s) by mouth q 4 to 6 hr prn.     Aspirin (ASA) 81mg Tablets, Enteric Coated 1 tab daily     Albuterol 0.083% Nebulizer Solution 1 vial q 6 hours prn SOA/Wheezing  DX J20.9     Cymbalta 30mg Capsules, Delayed Release 1 capsule daily     BD Ultra-Fine Mini Pen Needle 31G x 3/16\"  Pen Needle use dalily with bydureon and lantus as directed     Dulera 200mcg/5mcg Oral Inhaler 2 PUFFS BID     Hydralazine HCl 25mg Tablets 1 tab BID     Metoprolol 50mg Tablet 1 tab bid     Bumetanide 2mg Tablet 1 tab daily         OBJECTIVE:        Vitals:         Current: 3/28/2019 11:44:41 AM    Ht:  5 ft, 9 in;  Wt: 277.4 lbs;  BMI: 41.0    T: 97 F (oral);  BP: 119/69 mm Hg (right arm, sitting);  P: 98 bpm (right arm (BP Cuff), standing);  sCr: 0.91 mg/dL;  GFR: 107.96    O2 Sat: 98 % (room air)        Exams:     PHYSICAL EXAM:     GENERAL: Vitals recorded well groomed;  no apparent distress;     E/N/T: OROPHARYNX:  normal mucosa, dentition, gingiva, and posterior pharynx;     NECK:  supple, full ROM; no thyromegaly; no carotid bruits;     RESPIRATORY: normal appearance and symmetric expansion of chest wall; normal respiratory rate and pattern with no distress; expiratory wheezes in the mild;     CARDIOVASCULAR: normal rate; rhythm is regular;  normal S1; normal S2; no systolic murmur; no cyanosis; no edema;     NEUROLOGIC: mental status: oriented to person, place, and time;  GROSSLY INTACT     PSYCHIATRIC: affect/demeanor: flat;  normal psychomotor function; speech pattern: flat;  normal thought and perception;         ASSESSMENT           724.2   M54.5  Chronic low back pain              DDx:     401.1   I10  " Hypertension              DDx:     250.60   E10.49  Peripheral neuropathy attributed to type II diabetes              DDx:     250.01   E10.65  IDDM              DDx:     V58.69   Z79.899  Use of high risk medications              DDx:         ORDERS:         Meds Prescribed:       Refill of: Basaglar KwikPen (Insulin Glargine (rDNA)) 100units/1ml Injection inject 35units daily  DX  E11.9  #5 (Five) prefilled pen Refills: 5         Lab Orders:       [id number]  Drug test prsmv read direct optical obs pr date  (Send-Out)                   PLAN:          Chronic low back pain stable and well controlled          Hypertension well controlled          Peripheral neuropathy attributed to type II diabetes worse, try topical gabapentin          IDDM doing much better, he is doing great           Prescriptions:       Refill of: Basaglar KwikPen (Insulin Glargine (rDNA)) 100units/1ml Injection inject 35units daily  DX  E11.9  #5 (Five) prefilled pen Refills: 5          Use of high risk medications     christine reviewed, drug screen performed and appropriate, consent is reviewed and signed and on the chart, he is aware of risk of addiction on this medication and understands that he will need to follow up for a review every 3 months and his medications will be adjusted or decreased as deemed appropriate at each visit.  No personal history of drug or alcohol abuse.  No concerns about diversion or abuse.  He denies side effects related to the medication.  He is  aware that she may be called in for pill counts           Orders:       [id number]  Drug test prsmv read direct optical obs pr date  (Send-Out)               CHARGE CAPTURE           **Please note: ICD descriptions below are intended for billing purposes only and may not represent clinical diagnoses**        Primary Diagnosis:         724.2 Chronic low back pain            M54.5    Low back pain              Orders:          [id number]   Office/outpatient visit; established patient, level  4  (In-House)           401.1 Hypertension            I10    Essential (primary) hypertension    250.60 Peripheral neuropathy attributed to type II diabetes            E10.49    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    250.01 IDDM            E10.65    Type 1 diabetes mellitus with hyperglycemia    V58.69 Use of high risk medications            Z79.899    Other long term (current) drug therapy

## 2021-05-18 NOTE — PROGRESS NOTES
Preston Wallis  1965     Office/Outpatient Visit    Visit Date: Thu, Dec 5, 2019 11:20 am    Provider: Talisha Chaudhry N.P. (Assistant: Sosa Domínguez, )    Location: [facility]        Electronically signed by Talisha Chaudhry N.P. on  12/05/2019 01:17:15 PM                             Subjective:        CC: Gómez is a 54 year old White male.  presents today due to productive cough, SOA         HPI:           Gómez presents with acute upper respiratory infection, unspecified.  These have been present for the past 2 weeks.  The symptoms include sputum production and shortness of breath.  He denies fever.  He reports recent exposure to illness from grandchildren.  He has already tried to relieve the symptoms with Levaquin prescribed after sputum culture..  Sputum culture last month positive for moraxella catarrhalis and pseudomonas aeruginosa. Medical history is significant for COPD.  Breo daily, Albuterol     ROS:     CONSTITUTIONAL:  Negative for chills and fever.      EYES:  Negative for blurred vision and eye drainage.      E/N/T:  Negative for ear pain and sore throat.      CARDIOVASCULAR:  Negative for chest pain and palpitations.      RESPIRATORY:  Positive for recent cough, dyspnea and frequent wheezing.          Past Medical History / Family History / Social History:         Last Reviewed on 9/18/2019 08:33 PM by Pawan Teresa    Past Medical History:     Use of high risk medications     MRSA pneumonia     Chronic low back pain     Closed fracture of other tarsal and metatarsal bones     Eczema     Low back pain     Type 2 diabetes     Chronic bronchitis, mucopurulent     Diabetes with neurological manifestations, type II or unspecified type, not stated as uncontrolled     Allergies     Bronchiectasis     COPD     Hypercholesterolemia     Type II DM     Hypertension     GERD     Peripheral neuropathy attributed to type II diabetes     Erectile dysfunction due to organic reasons                  PREVENTIVE HEALTH MAINTENANCE             EYE EXAM: was last done 4/24/19         Surgical History:         Tonsillectomy/Adenoidectomy     L knee;    venous port - L chest;         Family History:         Positive for Coronary Artery Disease ( mother ) and Hypertension ( mother ).      Positive for Cancer- type not specified ( sister ).      Positive for Asthma ( father ).      Positive for Type 2 Diabetes ( mother ).          Social History:     Occupation: Disabled (due to COPD)     Marital Status:      Children: 2 children         Tobacco/Alcohol/Supplements:     Last Reviewed on 11/11/2019 08:39 AM by Lorin Lambert    Tobacco: He has a past history of cigarette smoking; quit date:  1993.  Non-drinker         Substance Abuse History:     Last Reviewed on 9/18/2019 08:33 PM by Pawan Teresa        Mental Health History:     Last Reviewed on 9/18/2019 08:33 PM by Pawan Teresa        Communicable Diseases (eg STDs):     Last Reviewed on 9/18/2019 08:33 PM by Pawan Teresa        Current Problems:     Last Reviewed on 11/11/2019 08:49 AM by Kimmy Riley    Low back pain    Other long term (current) drug therapy    Chronic low back pain    Unspecified closed foot fracture    Morbid (severe) obesity due to excess calories    Obstructive sleep apnea (adult) (pediatric)    Chronic obstructive pulmonary disease, unspecified    IDDM    Decompensated chronic obstructive pulmonary disease (COPD)    Foot ulcer    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 without evidence of necrosis    MRSA pneumonia    Major depressive disorder, recurrent, mild    Essential hypertension, benign    Unspecified fall, initial encounter    Fall NOS    Type 1 diabetes mellitus with diabetic chronic kidney disease        Immunizations:     zzFluzone pf-quadrivalent 3 and up 10/18/2016    zzFluzone pf-quadrivalent 3 and up 10/16/2017    Fluzone pf (3+ years dose) 9/19/2013    Fluzone  "Quadrivalent (3+ years) 10/8/2018    Tdap (Tetanus, reduced diph, acellular pertussis) 9/18/2018        Allergies:     Last Reviewed on 11/11/2019 08:49 AM by Kimmy Riley    Benadryl:      Proventil: candidiasis  (Adverse Reaction)        Current Medications:     Last Reviewed on 11/11/2019 08:49 AM by Kimmy Riley    Mucinex D     Aspirin Low Dose     Glucose Reagent Blood Test Strips  Reagent Strips [Check  blood sugars AC HS. Dispense brand covered by insurance.e11.9]    HYDROcodone-acetaminophen  mg oral tablet [One PO BID PRN]    BD Ultra-Fine Mini Pen Needle 31 gauge x 3/16\"  [use w/ trulicity & basaglar]    Omeprazole 40 mg oral capsule,delayed release (enteric coated) [Take 1 capsule(s) by mouth daily]    Loratadine 10 mg oral tablet [Take 1 tablet(s) by mouth daily]    Ventolin HFA 90 mcg/actuation Inhalation HFA Aerosol Inhaler [Inhale 2 puff(s) by mouth 4 times a day as needed]    dilTIAZem HCl 120 mg oral Capsule, Extended Release 24 hr [TAKE 1 CAPSULE BY MOUTH EVERY DAY]    metFORMIN 500 mg oral Tablet, Extended Release 24 hr [TAKE 2 TABLETS BY MOUTH TWICE DAILY]    Breo Ellipta 200mcg/25mcg Inhalation Powder [Take 1 inhalation(s) by mouth daily]    Basaglar KwikPen 100units/1ml Injection [inject 35units daily  DX  E11.9]    Losartan 50 mg oral tablet [Take 1 tablet(s) by mouth bid]    Gabapentin 400 mg oral capsule [1 TAB BID]    hydroCHLOROthiazide 12.5 mg oral capsule [TAKE 1 CAPSULE BY MOUTH TWICE DAILY]    Eliquis 5mg Tablet [take 1 tab BID]    Trulicity PEN 1.5mg/0.5ml Injection [inject 1.5mg once a week]    Cefepime 1 gram IV  BID times 7 days     losartan 50 mg oral tablet [take 1 tablet (50 mg) by oral route once daily]    traZODone 150 mg oral tablet [take 1 tablet (150 mg) by oral route qhs]    Cymbalta 60 mg oral capsule,delayed release (enteric coated) [take 1 capsule (60 mg) by oral route once daily]    fluticasone propionate 50 mcg/actuation Intranasal Spray, " "Suspension [inhale 1 spray (50 mcg) in each nostril by intranasal route 2 times per day]    HYDROcodone-acetaminophen  mg oral tablet [1 tab PO BID PRN]    Levaquin 500 mg oral tablet [take 1 tablet (500 mg) by oral route daily times 10 days]        Objective:        Vitals:         Historical:     11/11/2019  BP:   103/57 mm Hg ( (right arm, , sitting, );) 9/18/2019  BP:   111/41 mm Hg ( (left arm, , sitting, );) 11/11/2019  P:   74bpm ( (right arm (BP Cuff), , sitting, );)     Current: 12/5/2019 11:31:25 AM    Ht:  5 ft, 9 in;  Wt: 287 lbs (Estimated);  BMI: 42.4T: 97.6 F (oral);  BP: 141/72 mm Hg (left arm, sitting);  P: 90 bpm (left arm (BP Cuff), sitting);  sCr: 1.53 mg/dL;  GFR: 64.42O2 Sat: 92 % (2 liters O2)        Repeat:     12:12:42 PM  O2 Sat:   96% (2 liters O2)     Exams:     PHYSICAL EXAM:     GENERAL: well developed, well nourished;  no apparent distress;     EYES: PERRL, EOMI     E/N/T: EARS: external auditory canal normal;  bilateral TMs are normal;  NOSE: normal turbinates; no sinus tenderness; OROPHARYNX: oral mucosa is normal; posterior pharynx shows no exudate;     NECK: range of motion is normal; trachea is midline;     RESPIRATORY: increased A-P diameter resulting in \"barrel chest\"; normal respiratory rate and pattern with no distress; diffuse expiratory wheezes;     CARDIOVASCULAR: normal rate; rhythm is regular;     NEUROLOGIC: mental status: alert and oriented x 3; GROSSLY INTACT     PSYCHIATRIC: appropriate affect and demeanor;         Lab/Test Results:         Influenza A and B: Negative (12/05/2019),     Performed by:: tls (12/05/2019),     Rapid Strep Screen: Negative (12/05/2019),             Procedures:     Chronic obstructive pulmonary disease with (acute) exacerbation        Nebulizer: Medication: Albuterol sulfate 1 of Treatments treatments were performed on patient. Pre-treatment Pulse: 87; O2 Sat: 95 Post-treatment Pulse: 89; O2 Sat: 89 Lot#  [id number] Expiration:8/2020 Was " treatment tolerated?yes; Administered by: ael 1. Kenalog 60 mg given IM in the left hip; administered by ael;  lot number [id number]; expires 06/2021             Assessment:         J44.1   Chronic obstructive pulmonary disease with (acute) exacerbation       E10.22   Type 1 diabetes mellitus with diabetic chronic kidney disease           ORDERS:         Meds Prescribed:       [New Rx] predniSONE 10 mg oral tablet [take 4 tablets by oral route once daily for 4 days], #16 (sixteen) tablets, Refills: 0 (zero)       [New Rx] Augmentin 875-125 mg oral tablet [take 1 tablet by oral route every 12 hours], #20 (twenty) tablets, Refills: 0 (zero)       [New Rx] Levaquin 500 mg oral tablet [take 1 tablet (500 mg) by oral route every 24 hours], #7 (seven) tablets, Refills: 0 (zero)         Lab Orders:       56932  Infectious agent antigen detection by immunoassay; Influenza  (In-House)            12349-93  Infectious agent antigen detection by immunoassay; Influenza  (In-House)            12124  [facility] Rapid strep A  (In-House)            97574  [facility] Throat culture, strep  (Send-Out)              Procedures Ordered:       33105  inhalation treatment for acute airway obstruction or for sputum induction for diagnostic purposes; eg, with an aerosol generator nebulizer, metered dose inhaler, or intermittent positive pressure breathing (IPPB) device  (In-House)              Other Orders:       16407  Therapeutic injection  (In-House)              Albuterol, inhale soln, FDA-apprvd final, non-compounded, admin thru DME, unit dose 1 mg  (x1)          Kenalog, per 10 mg  (x6)                  Plan:         Chronic obstructive pulmonary disease with (acute) exacerbationStrong ER precautions given. If worsening shortness of breath, wheezing, he should go to ER. Use Albuterol neb every 4-6 hours.    LABORATORY:  Labs ordered to be performed today include Flu A&B Flu A Flu B and rapid strep test.       TESTS/PROCEDURES:  Will proceed with Inhalation Treatment Albuterol 0.083% unit dose (units 2.5) to be performed/scheduled now.      RECOMMENDATIONS given include: Push Fluids, Rest, Follow up if no improvement or worsening symptoms like high fevers, vomiting, weakness, or increasing shortness of air.    .  Steroids Kenalog 60 mg 1.5 ml     FOLLOW-UP: Chronic visit follow up           Prescriptions:       [New Rx] predniSONE 10 mg oral tablet [take 4 tablets by oral route once daily for 4 days], #16 (sixteen) tablets, Refills: 0 (zero)       [New Rx] Augmentin 875-125 mg oral tablet [take 1 tablet by oral route every 12 hours], #20 (twenty) tablets, Refills: 0 (zero)       [New Rx] Levaquin 500 mg oral tablet [take 1 tablet (500 mg) by oral route every 24 hours], #7 (seven) tablets, Refills: 0 (zero)           Orders:       93510  inhalation treatment for acute airway obstruction or for sputum induction for diagnostic purposes; eg, with an aerosol generator nebulizer, metered dose inhaler, or intermittent positive pressure breathing (IPPB) device  (In-House)              Albuterol, inhale soln, FDA-apprvd final, non-compounded, admin thru DME, unit dose 1 mg  (x1)        52117  Infectious agent antigen detection by immunoassay; Influenza  (In-House)            89627-93  Infectious agent antigen detection by immunoassay; Influenza  (In-House)            55165  [facility] Rapid strep A  (In-House)            25057  Therapeutic injection  (In-House)              Kenalog, per 10 mg  (x6)        65827  [facility] Throat culture, strep  (Send-Out)              Type 1 diabetes mellitus with diabetic chronic kidney diseaseAdvised to monitor blood sugar closely while taking oral steroids. Call for any concerns.             Charge Capture:         Primary Diagnosis:     J44.1  Chronic obstructive pulmonary disease with (acute) exacerbation           Orders:      72215  Office/outpatient visit; established patient,  level 3  (In-House)            79292  inhalation treatment for acute airway obstruction or for sputum induction for diagnostic purposes; eg, with an aerosol generator nebulizer, metered dose inhaler, or intermittent positive pressure breathing (IPPB) device  (In-House)              Albuterol, inhale soln, FDA-apprvd final, non-compounded, admin thru DME, unit dose 1 mg  (x1)        59169  Infectious agent antigen detection by immunoassay; Influenza  (In-House)            04769-82  Infectious agent antigen detection by immunoassay; Influenza  (In-House)            20679  [facility] Rapid strep A  (In-House)            20851  Therapeutic injection  (In-House)              Kenalog, per 10 mg  (x6)          E10.22  Type 1 diabetes mellitus with diabetic chronic kidney disease

## 2021-05-18 NOTE — PROGRESS NOTES
Preston Wallis  1965     Office/Outpatient Visit    Visit Date: Tue, Mar 17, 2020 11:01 am    Provider: Jorgito Ames MD (Assistant: Cary Verdugo RN)    Location: [facility]        Electronically signed by Jorgito Ames MD on  03/18/2020 07:46:42 PM                             Subjective:        CC: tachycardia    HPI:       Gómez is in today for evaluation of tachycardia.  He was noted by home health to have a very rapid heart beat after walking some today.  Home health is seeing him for PT and OT.  He has been weak for some time.  He was in [facility] fairly recently and was admitted for chest pain.  He was ruled out for acute MI then.  He did have stress testing yesterday, but we do not have results.  He did have echocardiogram done while in [facility] that actually looked pretty good.  He does feel light-headed when he stands, but this is not different for him.    ROS:     CONSTITUTIONAL:  Negative for chills and fever.      CARDIOVASCULAR:  Negative for chest pain and palpitations.      RESPIRATORY:  Positive for dyspnea ( at rest ).   Negative for recent cough.      GASTROINTESTINAL:  Negative for abdominal pain, nausea and vomiting.      INTEGUMENTARY:  Negative for atypical mole(s) and rash.          Past Medical History / Family History / Social History:         Last Reviewed on 3/17/2020 11:28 AM by Jorgito Ames    Past Medical History:     Use of high risk medications     MRSA pneumonia     Chronic low back pain     Closed fracture of other tarsal and metatarsal bones     Eczema     Low back pain     Type 2 diabetes     Chronic bronchitis, mucopurulent     Diabetes with neurological manifestations, type II or unspecified type, not stated as uncontrolled     Allergies     Bronchiectasis     COPD     Hypercholesterolemia     Type II DM     Hypertension     GERD     Peripheral neuropathy attributed to type II diabetes     Erectile dysfunction due to organic reasons                  PREVENTIVE HEALTH MAINTENANCE             EYE EXAM: was last done 4/24/19         Surgical History:         Cholecystectomy    Tonsillectomy/Adenoidectomy     L knee;    venous port - L chest;         Family History:         Positive for Coronary Artery Disease ( mother ) and Hypertension ( mother ).      Positive for Cancer- type not specified ( sister ).      Positive for Asthma ( father ).      Positive for Type 2 Diabetes ( mother ).          Social History:     Occupation: Disabled (due to COPD)     Marital Status:      Children: 2 children         Tobacco/Alcohol/Supplements:     Last Reviewed on 3/17/2020 11:28 AM by Jorgito Ames    Tobacco: He has a past history of cigarette smoking; quit date:  1993.  Non-drinker         Substance Abuse History:     Last Reviewed on 3/17/2020 11:28 AM by Jorgito Ames        Mental Health History:     Last Reviewed on 3/17/2020 11:28 AM by Jorgito Ames        Communicable Diseases (eg STDs):     Last Reviewed on 3/17/2020 11:28 AM by Jorgito Ames        Current Problems:     Last Reviewed on 3/17/2020 11:28 AM by Jorgito Ames    Low back pain    Other long term (current) drug therapy    Morbid (severe) obesity due to excess calories    Obstructive sleep apnea (adult) (pediatric)    Chronic obstructive pulmonary disease, unspecified    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 without evidence of necrosis    Major depressive disorder, recurrent, mild    Fall NOS    Unspecified fall, initial encounter    Type 1 diabetes mellitus with diabetic chronic kidney disease    Chronic obstructive pulmonary disease with (acute) exacerbation    Rash and other nonspecific skin eruption    Encounter for follow-up examination after completed treatment for conditions other than malignant neoplasm    Other forms of dyspnea    Other pulmonary embolism without acute cor pulmonale     "Essential (primary) hypertension    Pneumonia, unspecified organism    Encounter for screening for depression    Anemia, unspecified    Other specified noninfective gastroenteritis and colitis    Follow-up examination    Paroxysmal atrial fibrillation    Chest pain, unspecified    Tachycardia, unspecified        Immunizations:     influenza, injectable, quadrivalent, preservative free 12/5/2019    zzFluzone pf-quadrivalent 3 and up 10/18/2016    zzFluzone pf-quadrivalent 3 and up 10/16/2017    Fluzone pf (3+ years dose) 9/19/2013    Fluzone Quadrivalent (3+ years) 10/8/2018    Tdap (Tetanus, reduced diph, acellular pertussis) 9/18/2018        Allergies:     Last Reviewed on 3/17/2020 11:28 AM by Jorgito Ames    Benadryl:      Proventil: candidiasis  (Adverse Reaction)        Current Medications:     Last Reviewed on 3/17/2020 11:28 AM by Jorgito Ames    atorvastatin 20 mg oral tablet [take 1 tablet (20 mg) by oral route once daily]    insulin lispro 100 unit/mL subcutaneous Insulin Pen [inject by subcutaneous route per prescriber's instructions AC AND HS while on Steriods]    Glucose Reagent Blood Test Strips  Reagent Strips [Check  blood sugars AC HS. Dispense brand covered by insurance.e11.9]    HYDROcodone-acetaminophen  mg oral tablet [One PO BID PRN]    BD Ultra-Fine Mini Pen Needle 31 gauge x 3/16\"  [use w/ trulicity & basaglar]    omeprazole 40 mg oral capsule,delayed release (enteric coated) [Take 1 capsule(s) by mouth daily]    loratadine 10 mg oral tablet [TAKE 1 TABLET(S) BY MOUTH DAILY]    albuterol sulfate 90 mcg/actuation Inhalation HFA Aerosol Inhaler [INHALE 2 PUFF(S) BY MOUTH 4 TIMES A DAY AS NEEDED]    dilTIAZem HCl 120 mg oral Capsule, Extended Release 24 hr [TAKE 1 CAPSULE BY MOUTH EVERY DAY]    Bydureon 2 mg/0.65 mL subcutaneous Pen Injector [Inject 2 mg subcutaneously q week]    metFORMIN 500 mg oral Tablet, Extended Release 24 hr [2T PO BID]    atorvastatin 20 mg oral " tablet [TAKE 1  TABLET PO Q HS]    Breo Ellipta 200mcg/25mcg Inhalation Powder [Take 1 inhalation(s) by mouth daily]    furosemide 40 mg oral tablet [1T PO QD]    hydroCHLOROthiazide 12.5 mg oral capsule [TAKE 1 CAPSULE BY MOUTH TWICE DAILY]    Gabapentin 400 mg oral capsule [1 TAB BID]    Eliquis 5mg Tablet [take 1 tab BID]    losartan 100 mg oral tablet [take 1 tablet (100 mg) by oral route once daily]    traZODone 150 mg oral tablet [take 1 tablet (150 mg) by oral route qhs]    Cymbalta 60 mg oral capsule,delayed release (enteric coated) [take 1 capsule (60 mg) by oral route once daily]    ipratropium-albuterol 0.5 mg-3 mg(2.5 mg base)/3 mL Inhalation Solution for Nebulization [inhale 3 milliliters by nebulization route 4 times per day as needed]    Lotrisone 1-0.05 % Topical Cream [apply to the affected and surrounding areas of skin by topical route 2 times per day in the morning and evening]    BASAGLAR INJ 100UNIT Milliliters  [INJECT 40 UNITS UNDER THE SKIN EACH NIGHT AT BEDTIME]    potassium chloride 10 mEq oral capsule, extended release [take 1 capsule (10 meq) daily ]    Zofran 8 mg oral tablet [ODT FORM.  ONE Q SIX HOURS PRN N/V]    metoprolol tartrate 50 mg oral tablet [take 1 tablet (50 mg) by oral route 2 times per day with meals]        Objective:        Vitals:         Current: 3/17/2020 11:08:26 AM    Ht:  5 ft, 9 in;  Wt: 270 lbs (Estimated);  BMI: 39.9T: 98.7 F (oral);  BP: 151/69 mm Hg (left arm, sitting);  P: 132 bpm (finger clip, sitting);  sCr: 1.53 mg/dL;  GFR: 62.77O2 Sat: 95 % (2 liters O2)        Exams:     PHYSICAL EXAM:     GENERAL: Vitals recorded well developed,  moderately obese;     NECK: range of motion is normal; thyroid is non-palpable;     RESPIRATORY: normal respiratory rate and pattern with no distress; decreased breath sounds throughout; (+) expiratory wheezes;     CARDIOVASCULAR: normal rate; rhythm is regular;  no systolic murmur;     GASTROINTESTINAL: nontender; normal  bowel sounds; no masses;     SKIN:  no significant rashes or lesions; no suspicious moles;     NEUROLOGIC: mental status: alert and oriented x 3; cranial nerves II-XII grossly intact;     PSYCHIATRIC: appropriate affect and demeanor; normal psychomotor function;         Lab/Test Results:         LABORATORY RESULTS: EKG performed by pr         Procedures:     Tachycardia, unspecified        Holter Monitor: Holter monitor was hooked up, Gómez was given instructions on use.  Patient informed to return with device. 24 hour monitor ./pr 24 hr holter returned and data up loaded to [facility]./pr            Assessment:         R00.0   Tachycardia, unspecified       D64.9   Anemia, unspecified           ORDERS:         Radiology/Test Orders:       09276  Electrocardiogram, routine with at least 12 leads; with interpretation and report  (In-House)            40550  Holter monitor connection and disconnection  (In-House)              Lab Orders:       95118  BDCB2 - [facility] CBC w/o diff  (Send-Out)            84513  COMP - [facility] Comp. Metabolic Panel  (Send-Out)            06771  MG - [facility] Magnesium, Serum  (Send-Out)            21049  TSH - [facility] TSH  (Send-Out)            43938  FERR - [facility] Ferritin Serum  (Send-Out)            49121  IRONP - [facility] Iron and TIBC  (Send-Out)            09703  B12FO - [facility] Vitamin B12 with Folate  (Send-Out)                      Plan:         Tachycardia, unspecified    LABORATORY:  Labs ordered to be performed today include CBC W/O DIFF, Comprehensive metabolic panel, Magnesium level, and TSH.      TESTS/PROCEDURES:  Will proceed with Holter Monitor 24 hour to be performed/scheduled now.      RECOMMENDATIONS given include: Today, we have reviewed Gómez's care.  His heart rate is certainly better than it was at home.  He is not low on his blood pressure, and there is not an obvious finding on the EKG that looks concerning.  We will move ahead with Holter monitor as a precaution and repeat some  blood work as noted.  I want to be especially careful with Gómez that we keep him home if possible given the severity of his COPD.  No changes are anticipated.  He was to see endocrinology tomorrow, but I have recommended they not go..            Orders:       [id number]  Electrocardiogram, routine with at least 12 leads; with interpretation and report  (In-House)            07023  BDCB2 - [facility] CBC w/o diff  (Send-Out)            03606  COMP - [facility] Comp. Metabolic Panel  (Send-Out)            37361  MG - [facility] Magnesium, Serum  (Send-Out)            27509  TSH - [facility] TSH  (Send-Out)            13917  Holter monitor connection and disconnection  (In-House)              Anemia, unspecified    LABORATORY:  Labs ordered to be performed today include B12 with Folate, Ferritin Serum, and Iron, serum and TIBC.            Orders:       [id number]  FERR - [facility] Ferritin Serum  (Send-Out)            65092  IRONP - [facility] Iron and TIBC  (Send-Out)            64882  B12FO - [facility] Vitamin B12 with Folate  (Send-Out)                  Charge Capture:         Primary Diagnosis:     R00.0  Tachycardia, unspecified           Orders:      19787  Office/outpatient visit; established patient, level 4  (In-House)            90646  Electrocardiogram, routine with at least 12 leads; with interpretation and report  (In-House)            26034  Holter monitor connection and disconnection  (In-House)              D64.9  Anemia, unspecified

## 2021-05-18 NOTE — PROGRESS NOTES
Preston Wallis 1965     Office/Outpatient Visit    Visit Date: Mon, Oct 29, 2018 01:52 pm    Provider: Lilo Preciado N.P. (Assistant: Sarah Spurling, MA)    Location: [facility]        Electronically signed by Lilo Preciado N.P. on  10/29/2018 03:19:39 PM                             SUBJECTIVE:        CC:     Gómez is a 53 year old White male.  Trouble breathing, sinus infection.          HPI:         Patient complains of upper respiratory illness.  These have been present for the past 3 days.  The symptoms include chest congestion, cough, ear complaints, nasal congestion, nasal discharge and sputum production.  He denies fever or headache.  He reports recent exposure to illness from family members.  He has not tried any medications for symptomatic relief.  Medical history is significant for asthma and COPD.      ROS:     CONSTITUTIONAL:  Negative for chills, fatigue and fever.      E/N/T:  Positive for nasal congestion, frequent rhinorrhea and sinus pressure.   Negative for sore throat.      CARDIOVASCULAR:  Positive for dizziness ( off and on (chronic problem) ).   Negative for chest pain or pedal edema.      RESPIRATORY:  Positive for chronic cough, dyspnea, pleuritic chest pain and frequent wheezing.      GASTROINTESTINAL:  Negative for abdominal pain, constipation, diarrhea, heartburn, nausea and vomiting.      ALLERGIC/IMMUNOLOGIC:  Positive for seasonal allergies.          PM/FM/:     Last Reviewed on 10/08/2018 04:27 PM by Kimmy Riley    Past Medical History:     Use of high risk medications     MRSA pneumonia     Chronic low back pain     Closed fracture of other tarsal and metatarsal bones     Eczema     Low back pain     Type 2 diabetes     Chronic bronchitis, mucopurulent     Diabetes with neurological manifestations, type II or unspecified type, not stated as uncontrolled     Allergies     Bronchiectasis     COPD     Hypercholesterolemia     Type II DM      Hypertension     GERD     Peripheral neuropathy attributed to type II diabetes     Erectile dysfunction due to organic reasons             Surgical History:         Tonsillectomy/Adenoidectomy      L knee;    venous port - L chest;         Family History:         Positive for Coronary Artery Disease ( mother ) and Hypertension ( mother ).      Positive for Cancer- type not specified ( sister ).      Positive for Asthma ( father ).      Positive for Type 2 Diabetes ( mother ).          Social History:     Occupation: Disabled (due to COPD)     Marital Status:      Children: 2 children         Tobacco/Alcohol/Supplements:     Last Reviewed on 10/29/2018 01:52 PM by Spurling, Sarah C    Tobacco: He has a past history of cigarette smoking; quit date:  1993.  Non-drinker         Substance Abuse History:     Last Reviewed on 5/22/2013 03:43 PM by Jorgito Ames            Current Problems:     Last Reviewed on 10/29/2018 03:18 PM by Lilo Preciado    Acute exacerbation of chronic obstructive pulmonary disease (COPD)     Foot ulcer     Hypertension     Decompensated chronic obstructive pulmonary disease (COPD)     Dizziness     IDDM     Decompensated chronic obstructive pulmonary disease (COPD) with exacerbation     Light-headedness     Obstructive sleep apnea     Chronic bronchitis, obstructive, with (acute) exacerbation     Microscopic hematuria     Severe obesity     Unspecified closed foot fracture     Ulcer of toes     Use of high risk medications     Chronic low back pain     Eczema     Diabetes with neurological manifestations, type II or unspecified type, not stated as uncontrolled     Allergies     Bronchiectasis     COPD     Hypercholesterolemia     GERD     Peripheral neuropathy attributed to type II diabetes     Erectile dysfunction due to organic reasons     Cerumen impaction     Closed fracture of humerus, unspecified         Immunizations:     zzFluzone pf-quadrivalent 3 and up 10/18/2016  "    zzFluzone pf-quadrivalent 3 and up 10/16/2017     Fluzone pf (3+ years dose) 9/19/2013     Fluzone Quadrivalent (3+ years) 10/8/2018     Tdap (Tetanus, reduced diph, acellular pertussis) 9/18/2018         Allergies:     Last Reviewed on 10/29/2018 01:52 PM by Spurling, Sarah C    Proventil: candidiasis (Adverse Reaction)        Current Medications:     Last Reviewed on 10/29/2018 01:55 PM by Spurling, Sarah C    Trazodone HCl 100mg Tablet 1 tab HS     Loratadine 10mg Tablet Take 1 tablet(s) by mouth daily     Omeprazole 40mg Capsules, Extended Release Take 1 capsule(s) by mouth daily     Basaglar KwikPen 100units/1ml Injection Inject 15 units daily Dx E11.9     Hydrochlorothiazide (HCTZ) 12.5mg Tablet 1 po daily     Hydrocodone/Acetaminophen 10mg/325mg Tablet One PO BID PRN     Cardizem CD  120mg Capsules, Extended Release Take 1 capsule(s) by mouth daily     Metformin HCl 500mg Tablets, Extended Release 2 po bid     Bydureon 2mg/1pen Injection Suspension, Extended-Release Inject 2 mg subcutaneously q week     Amitriptyline HCl 25mg Tablet Take 1 tablet(s) by mouth at bedtime     Actos 45mg Tablet Take 1 tablet(s) by mouth daily     BD Insulin Syringe 1ml Syringe Use with Lantus at HS dx e11.9     Symbicort 160mcg/4.5mcg Oral Inhaler 1 puff BID     Breo Ellipta 200mcg/25mcg Inhalation Powder Take 1 inhalation(s) by mouth daily     Albuterol 0.083% Nebulizer Solution 1 vial(s) by nebulizer qid as directed     Montelukast Sodium 10mg Tablet 1 tab daily     Ventolin HFA 90mcg/1actuation Oral Inhaler Inhale 2 puff(s) by mouth 4 times a day as needed     BD Ultra-Fine Mini Pen Needle 31G x 3/16\"  Pen Needle Use TID with insulin.     Aspirin (ASA) 81mg Tablets, Enteric Coated 1 tab daily     Lisinopril 5mg Tablet 1 tab daily     Glucose Reagent Blood Test Strips  Reagent Strips Check blood sugar 1-2 times per day E11.9     Humalog 100units/1ml Injection give 2 units if BS between 150-199, 4units for 200-249, 6 units " for 250-349, 8 units if BS>350     Lancet   Lancet 1-2 times daily w/ one touch verio Dx E11.9     Atorvastatin Calcium 20mg Tablet 1 po q hs         OBJECTIVE:        Vitals:         Historical:     10/08/2018  BP:   185/79 mm Hg ( (left arm, , sitting, );)     07/06/2018  BP:   152/75 mm Hg ( (left arm, , sitting, );)     04/27/2018  BP:   123/68 mm Hg ( (left arm, , sitting, );)         Current: 10/29/2018 1:56:58 PM    Ht:  5 ft, 9 in;  Wt: 294 lbs;  BMI: 43.4    T: 98.4 F (oral);  BP: 159/82 mm Hg (left arm, sitting);  P: 109 bpm (left arm (BP Cuff), standing)    O2 Sat: 94 % (2 liters O2)        Exams:     PHYSICAL EXAM:     GENERAL: Vitals recorded well developed, well nourished;  no apparent distress;     E/N/T: EARS: external auditory canal normal bilaterally and occluded by cerumen on the left;  bilateral TMs are normal;  NOSE:  normal nasal mucosa, septum, turbinates, and sinuses; OROPHARYNX:  normal mucosa, dentition, gingiva, and posterior pharynx; (TM bilaterally Normal after irrigation)     NECK: range of motion is normal;     RESPIRATORY: normal appearance and symmetric expansion of chest wall; normal respiratory rate and pattern with no distress; diffuse expiratory wheezes;     CARDIOVASCULAR: mildly tachycardic;  rhythm is regular;     LYMPHATIC: no enlargement of cervical or facial nodes; no supraclavicular nodes;     MUSCULOSKELETAL: normal gait; decreased range of motion noted in: right arm (s/p surgery - in sling);  no limb or joint pain with range of motion;     NEUROLOGIC: mental status: alert and oriented x 3;     PSYCHIATRIC: appropriate affect and demeanor; normal speech pattern; normal thought and perception;         ASSESSMENT           491.21   J44.1  Acute exacerbation of chronic obstructive pulmonary disease (COPD)              DDx:     380.4   H61.23  Cerumen impaction              DDx:     401.1   I10  Hypertension              DDx:     250.01   E10.65  IDDM              DDx:          ORDERS:         Meds Prescribed:       Medrol (Methylprednisolone) 4mg Dosepak Take as directed with food  #1 (One) dose pack Refills: 0       Azithromycin 250mg Tablet 2 po today, 1 po x 4 days  #1 (One) tablet(s) Refills: 0         Procedures Ordered:       [id number]  Removal of impacted cerumen right ear (NURSE)  (In-House)           Other Orders:       [id number]  Removal of impacted cerumen left ear (NURSE)  (In-House)                   PLAN:          Acute exacerbation of chronic obstructive pulmonary disease (COPD) Has been instructed to call with follow up if worsening or if no better in 3-5 days - may need a CXR Also, he has not been using his spiriva - He has been instructed to use that as directed BID and to increase his nebulizer use over the next 3-5 days to minimal of BID  may use up to QID He denies needing any refills at this time           Prescriptions:       Medrol (Methylprednisolone) 4mg Dosepak Take as directed with food  #1 (One) dose pack Refills: 0       Azithromycin 250mg Tablet 2 po today, 1 po x 4 days  #1 (One) tablet(s) Refills: 0          Cerumen impaction           Orders:       [id number]  Removal of impacted cerumen left ear (NURSE)  (In-House)         [id number]  Removal of impacted cerumen right ear (NURSE)  (In-House)            Hypertension follow up with PCP as recommended          IDDM monitor BS while on Medrol dose pack - discontinue use if greater tan 200             CHARGE CAPTURE           **Please note: ICD descriptions below are intended for billing purposes only and may not represent clinical diagnoses**        Primary Diagnosis:         491.21 Acute exacerbation of chronic obstructive pulmonary disease (COPD)            J44.1    Chronic obstructive pulmonary disease with (acute) exacerbation              Orders:          [id number]   Office/outpatient visit; established patient, level 3  (In-House)           380.4 Cerumen impaction            H61.23    Impacted cerumen, bilateral               Orders:          [id number]   Removal of impacted cerumen left ear (NURSE)  (In-House)             [id number]   Removal of impacted cerumen right ear (NURSE)  (In-House)           401.1 Hypertension            I10    Essential (primary) hypertension    250.01 IDDM            E10.65    Type 1 diabetes mellitus with hyperglycemia

## 2021-05-18 NOTE — PROGRESS NOTES
Preston Wallis WANDER  1965     Office/Outpatient Visit    Visit Date: Tue, Sep 8, 2020 12:09 pm    Provider: Kimmy Riley MD (Assistant: Sosa Domínguez, )    Location: [facility]        Electronically signed by Kimmy Riley MD on  09/10/2020 01:21:34 PM                             Subjective:        CC: Gómez is a 55 year old White male.  He is here today following a transition of care from an inpatient hospital: [place]. The patient was admitted on 8/24/20 and discharged on 8/31/20. The patient was admitted for metabolic encephalopathy.. Our office called the patient within 48 hours of discharge and scheduled the follow-up appointment. During the patient's hospital stay the patient was treated by Dr. Perez..          HPI:           PHQ-9 Depression Screening: Completed form scanned and in chart; Total Score 24       Gómez was laying in bed and his wife came to bed and found him with his oxygen off and he started shaking all over for 30-60 seconds and the went into a deep sleep with snoring and was unconscious and unresponsive to his wife shaking him, EMS was called and he recieved narcan and was hospitalized and found to be in renal railur.  CXR showed opacities and it was questionable for COVID. Covid test was normal. CT and MRI were normal.  He ws d/c home with immobilization syndrome, he deferred rehab and was sent home.   Final dx was acute metbolic encephalopathy due to acute on chronic kidney disease (Cr 3), rapid afib with accelerated.  He also has chronic type 2 diabetes, hypomagnesemia, COPD/chronic bronchiectasis and morbid obesity.  He was treated with metooprolol/diltiazem (increased from 120 to 240)and eliquis for afib, hydralazine for HTN, chronic bronchiectasis with neg covid test, insulin for diabetes and he has lost weight. magnesium for his hypomagnesium, symbicort and nebs for his COPD.  A seizure study could not be performed in the hospital.  He was d/c last  "Monday Aug 31st without oxygen.  He is also still on his cymbalta and bydureon.  He has not been taking his iron supplementation.  He was d/c home on omeprazole but I am going to change this to pepcid due to renal failure.        He was suppose to go to rehab and patient refused.        He has been taken off his lasix, hydrocodone, gabapentin, muccinex, hctz, loratadine, losartan, metformin, K and trazodone.            His wife states since he has been home he is not himself.  He is in a lot of lower back pain,  he is very depressed with anhydonia without sadness, poor appetite, poor sleep since stopping trazodone.  His wife is worried about him, states he doesnt \"cut up\" like he normally does at home.    ROS:     CONSTITUTIONAL:  Negative for chills and fever.      EYES:  Negative for blurred vision and eye pain.      E/N/T:  Positive for nasal congestion.   Negative for ear pain, frequent rhinorrhea or sore throat.      CARDIOVASCULAR:  Negative for chest pain, orthopnea, paroxysmal nocturnal dyspnea and pedal edema.      RESPIRATORY:  Positive for persistent cough ( typically dry ).   Negative for dyspnea, hemoptysis or frequent wheezing.      GASTROINTESTINAL:  Negative for abdominal pain, heartburn, constipation, diarrhea, and stool changes.      INTEGUMENTARY:  Negative for rash.      NEUROLOGICAL:  Negative for dizziness and headaches.      PSYCHIATRIC:  Negative for anxiety, depression, and sleep disturbances.          Past Medical History / Family History / Social History:         Last Reviewed on 7/22/2020 02:07 PM by Kimmy Riley    Past Medical History:     Use of high risk medications     MRSA pneumonia     Chronic low back pain     Closed fracture of other tarsal and metatarsal bones     Eczema     Low back pain     Type 2 diabetes     Chronic bronchitis, mucopurulent     Diabetes with neurological manifestations, type II or unspecified type, not stated as uncontrolled     Allergies     " Bronchiectasis     COPD     Hypercholesterolemia     Type II DM     Hypertension     GERD     Peripheral neuropathy attributed to type II diabetes     Erectile dysfunction due to organic reasons                 PREVENTIVE HEALTH MAINTENANCE             EYE EXAM: was last done 4/24/19         Surgical History:         Cholecystectomy    Tonsillectomy/Adenoidectomy     L knee;    venous port - L chest;         Family History:         Positive for Coronary Artery Disease ( mother ) and Hypertension ( mother ).      Positive for Cancer- type not specified ( sister ).      Positive for Asthma ( father ).      Positive for Type 2 Diabetes ( mother ).          Social History:     Occupation: Disabled (due to COPD)     Marital Status:      Children: 2 children         Tobacco/Alcohol/Supplements:     Last Reviewed on 9/08/2020 12:17 PM by Sosa Domínguez    Tobacco: He has a past history of cigarette smoking; quit date:  1993.  Non-drinker         Substance Abuse History:     Last Reviewed on 4/09/2020 12:12 PM by Pawan Teresa        Mental Health History:     Last Reviewed on 4/09/2020 12:12 PM by Pawan Teresa        Communicable Diseases (eg STDs):     Last Reviewed on 4/09/2020 12:12 PM by Pawan Teresa        Allergies:     Last Reviewed on 7/22/2020 01:08 PM by Yessi Stockton    Benadryl:      Proventil: candidiasis  (Adverse Reaction)        Current Medications:     Last Reviewed on 9/02/2020 02:33 PM by Tara Rosado    aspirin 81 mg oral tablet, delayed release (enteric coated) [take 1 tablet (81 mg) by oral route once daily]    Symbicort 160-4.5 mcg/actuation Inhalation HFA Aerosol Inhaler [inhale 2 puffs by inhalation route 2 times per day in the morning and evening]    dilTIAZem HCl 240 mg oral Capsule, Extended Release 24 hr [take 1 capsule (240 mg) by oral route once daily]    hydrALAZINE 25 mg oral tablet [take 1 tablet (25 mg) by oral route 2 times per day with food]    insulin glargine  "100 unit/mL subcutaneous Solution [inject by subcutaneous 50 units every morning]    insulin lispro 100 unit/mL subcutaneous Insulin Pen [inject by subcutaneous route 0-6 units as needed for high blood sugar]    metoprolol tartrate 50 mg oral tablet [take 1 tablet (50 mg) by oral route 2 times per day with meals]    Accu-Chek SmartView Test Strips  [CHECK BLOOD SUGAR BEFORE MEALS AND AT BEDTIME]    BD Ultra-Fine Mini Pen Needle 31 gauge x 3/16\"  [use w/ trulicity & basaglar]    omeprazole 40 mg oral capsule,delayed release (enteric coated) [TAKE 1 CAPSULE(S) BY MOUTH DAILY]    albuterol sulfate 90 mcg/actuation Inhalation HFA Aerosol Inhaler [INHALE 2 PUFF(S) BY MOUTH 4 TIMES A DAY AS NEEDED]    Bydureon 2 mg/0.65 mL subcutaneous Pen Injector [Inject 2 mg subcutaneously q week]    Eliquis 5 mg oral tablet [1T PO BID]    DULoxetine 60 mg oral capsule,delayed release (enteric coated) [1C PO QD]    Lotrisone 1-0.05 % Topical Cream [apply to the affected and surrounding areas of skin by topical route 2 times per day in the morning and evening]    Calcitrate 200 mg (950 mg) oral tablet [1T PO QD]    magnesium oxide 400 mg (241.3 mg magnesium) oral tablet [TAKE ONE TABLET THREE TIMES DAILY]    ferrous sulfate 325 mg (65 mg iron) oral tablet [take 1 tablet (325 mg) by oral route once daily]    lancets  [check blood sugar ac and hs E11.9]    blood pressure monitor kit  [patient to monitor b/p twice daily DX I 10]    atorvastatin 20 mg oral tablet [TAKE 1 TABLET BY MOUTH EVERY AT BEDTIME]        Objective:        Vitals:         Current: 9/8/2020 12:19:41 PM    Ht:  5 ft, 9 in;  Wt: 266 lbs;  BMI: 39.3T: 97 F (temporal);  BP: 139/54 mm Hg (left arm, sitting);  P: 60 bpm (left arm (BP Cuff), sitting);  sCr: 1.07 mg/dL;  GFR: 88.18        Exams:     PHYSICAL EXAM:     GENERAL: Vitals recorded well developed, well nourished;  well groomed;  no apparent distress;     EYES: PERRL, EOMI     E/N/T: EARS:  normal external auditory " canals and tympanic membranes;  grossly normal hearing; OROPHARYNX:  normal mucosa, dentition, gingiva, and posterior pharynx;     NECK: range of motion is normal; thyroid is non-palpable; carotid exam is normal with good upstroke and no bruits; supple;     RESPIRATORY: normal respiratory rate and pattern with no distress;     CARDIOVASCULAR: regular rate and rhythm; normal S1, S2; no murmur, rub, or gallop; normal PMI;     GASTROINTESTINAL: nontender; normal bowel sounds;     MUSCULOSKELETAL: gait: in wheel chair-refuses to walk but he can with a walker;     NEUROLOGIC: mental status: oriented to person, place, and time;  GROSSLY INTACT     PSYCHIATRIC: affect/demeanor: flat;  normal psychomotor function; speech pattern: flat;  normal thought and perception;         Assessment:         R55   Syncope and collapse       J44.9   Chronic obstructive pulmonary disease, unspecified       E10.22   Type 1 diabetes mellitus with diabetic chronic kidney disease       I10   Essential (primary) hypertension       D64.9   Anemia, unspecified       I48.0   Paroxysmal atrial fibrillation       F33.1   Major depressive disorder, recurrent, moderate       M62.81   Muscle weakness (generalized)       K21.9   Gastro-esophageal reflux disease without esophagitis       Z13.31   Encounter for screening for depression       N18.3   Chronic kidney disease, stage 3 (moderate)       J96.11   Chronic respiratory failure with hypoxia       G62.9   Polyneuropathy, unspecified       I50.32   Chronic diastolic (congestive) heart failure       M47.896   Other spondylosis, lumbar region       Z99.81   Dependence on supplemental oxygen           ORDERS:         Meds Prescribed:       [Refilled] DULoxetine 60 mg oral capsule,delayed release (enteric coated) [1 po bid], #180 (one hundred and eighty) capsules, Refills: 0 (zero)       [Recorded] traZODone 50 mg oral tablet [take 1 tablet (50 mg) by oral route 3 times per day]       [Refilled] traZODone  50 mg oral tablet [take 1 tablet (50 mg) by oral route qhs], #90 (ninety) tablets, Refills: 0 (zero)       [Recorded] famotidine 40 mg oral tablet [take 1 tablet (40 mg) by oral route daily]       [Refilled] famotidine 40 mg oral tablet [take 1 tablet (40 mg) by oral route daily], #90 (ninety) tablets, Refills: 1 (one)         Other Orders:         Depression screen positive and follow up plan documented  (In-House)                      Plan:         Syncope and collapsedue to renal failure and multiple medications, he is back to baseling at this time, Final dx was acute metbolic encephalopathy due to acute on chronic kidney disease (Cr 3), rapid afib with accelerated.  He also has chronic type 2 diabetes, hypomagnesemia, COPD/chronic bronchiectasis and morbid obesity.  He was treated with metooprolol/diltiazem (increased from 120 to 240)and eliquis for afib, hydralazine for HTN, chronic bronchiectasis with neg covid test, insulin for diabetes and he has lost weight. magnesium for his hypomagnesium, symbicort and nebs for his COPD.  A seizure study could not be performed in the hospital.  He was d/c last Monday Aug 31st without oxygen.  He is also still on his cymbalta and bydureon.  He has not been taking his iron supplementation.  He was d/c home on omeprazole but I am going to change this to pepcid due to renal failure.        He was suppose to go to rehab and patient refused.        He has been taken off his lasix, hydrocodone, gabapentin, muccinex, hctz, loratadine, losartan, metformin, K and trazodone.        Chronic obstructive pulmonary disease, unspecifiedstable, told he does not need oxygen anymore        Essential (primary) hypertensionstable        Anemia, unspecifiedHCT 28-he needs to restart his iron        Paroxysmal atrial fibrillationback to rate controlled on metoprolol, eliquis and higher dose of diltiazem        Major depressive disorder, recurrent, moderateHis wife states since he has been  "home he is not himself.  He is in a lot of lower back pain,  he is very depressed with anhydonia without sadness, poor appetite, poor sleep since stopping trazodone.  His wife is worried about him, states he doesnt \"cut up\" like he normally does at home. will increase his cymbalta, recommend therapy, he defers, and restart his trazodone for his insomnia        Muscle weakness (generalized)will have [facility] nursing and PT see him in his home        Gastro-esophageal reflux disease without esophagitisstop omeprazole and try pepcid          Prescriptions:       [Refilled] DULoxetine 60 mg oral capsule,delayed release (enteric coated) [1 po bid], #180 (one hundred and eighty) capsules, Refills: 0 (zero)       [Recorded] traZODone 50 mg oral tablet [take 1 tablet (50 mg) by oral route 3 times per day]       [Refilled] traZODone 50 mg oral tablet [take 1 tablet (50 mg) by oral route qhs], #90 (ninety) tablets, Refills: 0 (zero)       [Recorded] famotidine 40 mg oral tablet [take 1 tablet (40 mg) by oral route daily]       [Refilled] famotidine 40 mg oral tablet [take 1 tablet (40 mg) by oral route daily], #90 (ninety) tablets, Refills: 1 (one)         Encounter for screening for depression    MIPS PHQ-9 Depression Screening: Completed form scanned and in chart; Total Score 24 Positive Depression Screen: Suicide Risk Assessment completed--denies suicidal/homicidal ideation; Pharmacologic intervention initiated/modified; due to lack of pain pills and sleep meds and being in the hospital, since he has been home he is feeling so much better           Orders:         Depression screen positive and follow up plan documented  (In-House)              Chronic kidney disease, stage 3 (moderate)back to baseline        Chronic respiratory failure with hypoxiastable, sees pulmonology        Polyneuropathy, unspecifiedoff gabapentin, will follow        Chronic diastolic (congestive) heart failurestable, no acute issues        Other " spondylosis, lumbar regionoff pain meds, recommend referral to pain management if needed        Dependence on supplemental oxygenon 2 liters O2 continurous            Charge Capture:         Primary Diagnosis:     R55  Syncope and collapse           Orders:      18319  Transitional care manage service 14 day discharge  (In-House)              J44.9  Chronic obstructive pulmonary disease, unspecified     E10.22  Type 1 diabetes mellitus with diabetic chronic kidney disease     I10  Essential (primary) hypertension     D64.9  Anemia, unspecified     I48.0  Paroxysmal atrial fibrillation     F33.1  Major depressive disorder, recurrent, moderate     M62.81  Muscle weakness (generalized)     K21.9  Gastro-esophageal reflux disease without esophagitis     Z13.31  Encounter for screening for depression           Orders:        Depression screen positive and follow up plan documented  (In-House)              N18.3  Chronic kidney disease, stage 3 (moderate)     J96.11  Chronic respiratory failure with hypoxia     G62.9  Polyneuropathy, unspecified     I50.32  Chronic diastolic (congestive) heart failure     M47.896  Other spondylosis, lumbar region     Z99.81  Dependence on supplemental oxygen         ADDENDUMS:      ____________________________________    Addendum: 09/09/2020 10:16 AM - One, Team         [facility]; Number [phone]            Addendum: 09/14/2020 04:35 PM - Kimmy Riley            HPI:  c/o very depressed and poor sleep    ROS: should state POSITIVE for depression and sleep disturbance. denzel

## 2021-05-18 NOTE — PROGRESS NOTES
Preston Wallis 1965     Office/Outpatient Visit    Visit Date: Fri, Apr 27, 2018 02:43 pm    Provider: Kimmy Riley MD (Assistant: Cary Verdugo RN)    Location: [facility]        Electronically signed by Kimmy Riley MD on  04/28/2018 08:46:36 AM                             SUBJECTIVE:        HPI:         Dx with hypertension; his current cardiac medication regimen includes an ACE inhibitor ( Zestril ).  He is tolerating the medication well without side effects.  Compliance with treatment has been good; he takes his medication as directed and follows up as directed.          Type II DM details; specifically, this is type 1 diabetes, complicated by peripheral neuropathy.  Compliance with treatment has been good; he takes his medication as directed and is keeping a glucose diary.  He denies experiencing any diabetes related symptoms.  Depression screen is performed and is negative.      Tobacco screen: Non-smoker.  Current meds include an oral hypoglycemic ( Actos, Glucotrol, and bydureon ), aspirin, and a lipid lowering agent.  He does not perform home blood glucose monitoring.  Most recent lab results include TSH:  1.360 (mIU/L) (07/12/2017), Creatinine, Serum:  1.08 (mg/dl) (07/12/2017), Glom Filt Rate, Est:  >60 (ml/min/1.73m2) (07/12/2017), Alkaline Phosphatase, Serum:  110 (U/L) (07/12/2017), ALT (SGPT):  33 (U/L) (07/12/2017), AST (SGOT):  47 (U/L) (07/12/2017), Total Cholesterol:  253 (10/25/2017), HDL:  38 (10/25/2017), Triglycerides:  219 (10/25/2017), LDL:  171 (10/25/2017), Microalbumin, Urine, rand:  583.7 (mg/L) (07/12/2017), Hemoglobin A1c:  7.1 (10/16/2017).  fell a month ago, rehurt his back In regard to preventative care, his last ophthalmology exam was in 10/2017.      ROS:     CONSTITUTIONAL:  Negative for chills, fatigue, fever and weight change.      E/N/T:  Negative for nasal congestion and frequent rhinorrhea.      CARDIOVASCULAR:  Positive for pedal edema.   Negative  for chest pain, orthopnea or paroxysmal nocturnal dyspnea.      RESPIRATORY:  Positive for chronic cough and dyspnea.      GASTROINTESTINAL:  Negative for abdominal pain, heartburn, constipation, diarrhea, and stool changes.      INTEGUMENTARY:  Positive for left great toe ulcer.          PMH/FMH/SH:     Last Reviewed on 4/27/2018 03:34 PM by Kimmy Riley    Past Medical History:     Use of high risk medications     MRSA pneumonia     Chronic low back pain     Closed fracture of other tarsal and metatarsal bones     Eczema     Low back pain     Type 2 diabetes     Chronic bronchitis, mucopurulent     Diabetes with neurological manifestations, type II or unspecified type, not stated as uncontrolled     Allergies     Bronchiectasis     COPD     Hypercholesterolemia     Type II DM     Hypertension     GERD     Peripheral neuropathy attributed to type II diabetes     Erectile dysfunction due to organic reasons             Surgical History:         Tonsillectomy/Adenoidectomy      L knee;    venous port - L chest;         Family History:         Positive for Coronary Artery Disease ( mother ) and Hypertension ( mother ).      Positive for Cancer- type not specified ( sister ).      Positive for Asthma ( father ).      Positive for Type 2 Diabetes ( mother ).          Social History:     Occupation: Disabled (due to COPD)     Marital Status:      Children: 2 children         Tobacco/Alcohol/Supplements:     Last Reviewed on 4/27/2018 03:34 PM by Kimmy Riley    Tobacco: He has a past history of cigarette smoking; quit date:  1993.  Non-drinker         Substance Abuse History:     Last Reviewed on 5/22/2013 03:43 PM by Jorgito Ames            Allergies:     Last Reviewed on 1/30/2018 10:03 AM by Leonela Melendez    Proventil: candidiasis (Adverse Reaction)        Current Medications:     Last Reviewed on 12/15/2017 04:35 PM by Yessi Stockton    Actos 45mg Tablet Take 1 tablet(s)  "by mouth daily     Amitriptyline HCl 25mg Tablet Take 1 tablet(s) by mouth at bedtime     Hydrocodone/Acetaminophen 10mg/325mg Tablet 1 tab every 8 hours prn     Trazodone HCl 100mg Tablet 1 tab HS     Cardizem CD  120mg Capsules, Extended Release Take 1 capsule(s) by mouth daily     Loratadine 10mg Tablet Take 1 tablet(s) by mouth daily     Metformin HCl 500mg Tablets, Extended Release 2 po bid     Omeprazole 40mg Capsules, Extended Release Take 1 capsule(s) by mouth daily     Bydureon 2mg/1pen Injection Suspension, Extended-Release Inject 2 mg subcutaneously q week     Symbicort 160mcg/4.5mcg Oral Inhaler 1 puff BID     Lisinopril 10mg Tablet 1/2 tab daily     Breo Ellipta 200mcg/25mcg Inhalation Powder Take 1 inhalation(s) by mouth daily     Albuterol 0.083% Nebulizer Solution 1 vial(s) by nebulizer qid as directed     Levemir 100units/1ml Injection 70 units bid     Hydrochlorothiazide (HCTZ) 12.5mg Tablet 1 po daily     Montelukast Sodium 10mg Tablet 1 tab daily     Ventolin HFA 90mcg/1actuation Oral Inhaler Inhale 2 puff(s) by mouth 4 times a day as needed     Insulin Syringes 1ml Syringe Use as directed     Mucinex 600mg Tablets, Extended Release Take 2 tablet(s) by mouth q12h     BD Ultra-Fine Mini Pen Needle 31G x 3/16\"  Pen Needle Use TID with insulin.     Aspirin (ASA) 81mg Tablets, Enteric Coated 1 tab daily     Atorvastatin Calcium 20mg Tablet 1 po q hs         OBJECTIVE:        Vitals:         Current: 4/27/2018 2:44:55 PM    Ht:  5 ft, 9 in;  Wt: 307 lbs;  BMI: 45.3    T: 98 F (oral);  BP: 123/68 mm Hg (left arm, sitting);  P: 104 bpm (left arm (BP Cuff), standing);  sCr: 1.08 mg/dL;  GFR: 96.03        Exams:     PHYSICAL EXAM:     GENERAL: Vitals recorded well developed, well nourished;  well groomed;  no apparent distress;     EYES: PERRL, EOMI     E/N/T: EARS:  normal external auditory canals and tympanic membranes;  grossly normal hearing; OROPHARYNX:  normal mucosa, dentition, gingiva, and " posterior pharynx;     NECK:  supple, full ROM; no thyromegaly; no carotid bruits;     RESPIRATORY: normal respiratory rate and pattern with no distress; normal breath sounds with no rales, rhonchi, wheezes or rubs;     CARDIOVASCULAR: normal rate; rhythm is regular;  normal S1; normal S2; no systolic murmur; no cyanosis; no edema;     MUSCULOSKELETAL: gait: affected by a limp and unsteady;     NEUROLOGICAL:  cranial nerves, motor and sensory function, reflexes, gait and coordination are all intact;     PSYCHIATRIC:  appropriate affect and demeanor; normal speech pattern; grossly normal memory;     Left foot exam    Protective sensation using Monofilament test: Loss of protective sensation. Approximately 4 grams of force is applied without sensory awareness.    Vascular status: normal peripheral vascular exam with palpable dorsal pedal and posterior tibal pulses and brisk digital capillary refill    Skin is intact without sores or ulcers    Right foot exam    Protective sensation using Monofilament test: Decreased, with estimated force of 2 grams applied.    Vascular deficit noted in the dorsal pedal artery and the posterior tibial artery         Lab/Test Results:             Amphetamines Screen, Urin:  Negative (04/27/2018),     BAR-Barbiturates Screen, Urin:  Negative (04/27/2018),     Buprenorphine:  Negative (04/27/2018),     BZO-Benzodiazepines Screen,Ur:  Negative (04/27/2018),     Cocaine(Metab.)Screen, Ur:  Negative (04/27/2018),     MDMA-Ecstasy:  Negative (04/27/2018),     Met-Methamphetamine:  Negative (04/27/2018),     MTD-Methadone Screen, Urine:  Negative (04/27/2018),     Opiate Screen, Urine:  Positive (04/27/2018),     OXY-Oxycodone:  Negative (04/27/2018),     PCP-Phencyclidine Screen, Uri:  Negative (04/27/2018),     THC Cannabinoids Screen, Urin:  Negative (04/27/2018),     Urine temperature:  confirmed (04/27/2018),     Date and time of last pill:  Hydrocodone 4-27-18 at 8am (04/27/2018),      Performed by:  pr (04/27/2018),     Collection Time:  2:49 (04/27/2018),             ASSESSMENT           724.2   M54.5  Chronic low back pain              DDx:     401.1   I10  Hypertension              DDx:     250.60   E11.40  Peripheral neuropathy attributed to type II diabetes              DDx:     250.00   E11.8  Type II DM              DDx:     V58.69   Z79.899  Use of high risk medications              DDx:         ORDERS:         Meds Prescribed:       Refill of: Hydrocodone/Acetaminophen 10mg/325mg Tablet 1 tab every 8 hours prn  #60 (Sixty) tablet(s) Refills: 0         Lab Orders:       [id number]  Drug test prsmv read direct optical obs pr date  (In-House)         22396  DIAB - [facility] CMP A1C LIPID AND MICRO ALBUM CR RATIO: [id number]  (Send-Out)           Other Orders:       2028F  Foot examination performed (includes examination through visual inspection, sensory exam with monofi  (In-House)                   PLAN:          Chronic low back pain stable on meds          Hypertension stable and well controlled          Peripheral neuropathy attributed to type II diabetes wears diabetic shoes          Type II DM he doesnt check his BSs, on bydureon, off insulin, wearing his cpap, he has diabetic shoes but poor compliance, foot exam noted above, eye exam noted above, start compression stockings for his LE edema     LABORATORY:  Labs ordered to be performed today include Diabetes Panel 2;CMP, A1C, Lipid, Microalbumin:Creatinine Ratio.            Prescriptions:       Refill of: Hydrocodone/Acetaminophen 10mg/325mg Tablet 1 tab every 8 hours prn  #60 (Sixty) tablet(s) Refills: 0           Orders:       [id number]  DIAB[address] [place] [address] CMP A1C LIPID AND MICRO ALBUM CR RATIO: [id number]  (Send-Out)            Use of high risk medications christine reviewed, drug screen performed and appropriate, consent is reviewed and signed and on the chart, pt is aware of risk of addiction on this medication  and understands that he will need to follow up for a review every 3 months and his medications will be adjusted or decreased as deemed appropriate at each visit.  No personal history of drug or alcohol abuse.  No concerns about diversion or abuse.  He denies side effects related to the medication.  He is  aware that he may be called in for pill counts.           Orders:       [id number]  Drug test prsmv read direct optical obs pr date  (In-House)               Other Orders:       2028F  Foot examination performed (includes examination through visual inspection, sensory exam with monofi  (In-House)           CHARGE CAPTURE           **Please note: ICD descriptions below are intended for billing purposes only and may not represent clinical diagnoses**        Primary Diagnosis:         724.2 Chronic low back pain            M54.5    Low back pain              Orders:          [id number]   Office/outpatient visit; established patient, level 4  (In-House)           401.1 Hypertension            I10    Essential (primary) hypertension    250.60 Peripheral neuropathy attributed to type II diabetes            E11.40    Type 2 diabetes mellitus with diabetic neuropathy, unspecified    250.00 Type II DM            E11.8    Type 2 diabetes mellitus with unspecified complications    V58.69 Use of high risk medications            Z79.899    Other long term (current) drug therapy              Orders:          [id number]   Drug test prsmv read direct optical obs pr date  (In-House)               Other Orders:           2028F   Foot examination performed (includes examination through visual inspection, sensory exam with monofi  (In-House)           ADDENDUMS:      ____________________________________    Date: 05/24/2018 09:25 AM    Author: Randee Spears         Visit Note Faxed to:        CHAPIS Aguayo (Endocrinology); Number [phone]

## 2021-05-18 NOTE — PROGRESS NOTES
Preston Wallis  1965     Office/Outpatient Visit    Visit Date: Wed, Dec 23, 2020 02:52 pm    Provider: Kimmy Riley MD (Assistant: Yessi Stockton MA)    Location: [facility]        Electronically signed by Kimmy Riley MD on  12/30/2020 11:47:53 AM                             Subjective:        CC: Gómez is a 55 year old White male.  This is a follow-up visit.  for his poorly controlled diabetes and recent pneumonia hospitalizationPT SAYS BASAGLAR IS 63 UNITS IN THE MORNING dox video [phone]        HPI:       Gómez was in the hospital recently for pseudomonas pneumonia, and he is doing much better.  He is still on inhaled tobramycin for chronic pseudomonas bronchiectasis by Dr Chavez.  He just did a telehealth with his doctor.   He is also on symbicort, albuterol nebulizers.    CXR showed bronchial thickening and bronchiectasis.            Type 1 diabetes mellitus with diabetic chronic kidney disease details; specifically, this is type 1 diabetes, complicated by nephropathy and peripheral neuropathy.  Compliance with treatment has been poor; he skips some insulin doses due to forgetfulness and inconvenience of dosing and does not follow a diet and exercise regimen.      Tobacco screen: Non-smoker.  Current meds include insulin/injectable ( amdelog, basaglar 60 units in am ).  He does not perform home blood glucose monitoring.  Most recent lab results include Creatinine, Serum:  1.13 (mg/dl) (09/28/2020), Glom Filt Rate, Est:  >60 (ml/min/1.73m2) (09/28/2020), Iron, Serum:  76 (ug/dL) (06/17/2020), TSH:  0.580 (mIU/L) (03/17/2020), Hemoglobin:  11.30 (gm/dl) (10/13/2020), Hematocrit:  33.5 (%) (10/13/2020), Total Cholesterol:  129 (mg/dL) (11/11/2019), HDL:  37 (mg/dL) (11/11/2019), Triglycerides:  172 (mg/dL) (11/11/2019), LDL:  58 (mg/dL) (11/11/2019), Microalbumin, Urine, rand:  926.0 (mg/L) (11/11/2019), Hemoglobin A1c:  11.6 (09/21/2020).  Has not fallen recently In  regard to preventative care, his last ophthalmology exam was in 4/2019.      ROS:     CONSTITUTIONAL:  Negative for chills and fever.      EYES:  Negative for blurred vision and eye pain.      E/N/T:  Negative for ear pain, frequent rhinorrhea and sore throat.      CARDIOVASCULAR:  Negative for chest pain, orthopnea, paroxysmal nocturnal dyspnea and pedal edema.      RESPIRATORY:  Positive for recent cough ( productive and green ), persistent cough ( typically dry ) and dyspnea.   Negative for hemoptysis or frequent wheezing.      GASTROINTESTINAL:  Negative for abdominal pain, constipation, diarrhea, nausea and vomiting.      INTEGUMENTARY:  Negative for rash.      NEUROLOGICAL:  Positive for weakness.   Negative for dizziness or headaches.      PSYCHIATRIC:  Positive for anxiety and feelings of stress.   Negative for sleep disturbance or suicidal thoughts.          Past Medical History / Family History / Social History:         Last Reviewed on 12/23/2020 03:50 PM by Kimmy Riley    Past Medical History:     Use of high risk medications     MRSA pneumonia     Chronic low back pain     Closed fracture of other tarsal and metatarsal bones     Eczema     Low back pain     Type 2 diabetes     Chronic bronchitis, mucopurulent     Diabetes with neurological manifestations, type II or unspecified type, not stated as uncontrolled     Allergies     Bronchiectasis     COPD     Hypercholesterolemia     Type II DM     Hypertension     GERD     Peripheral neuropathy attributed to type II diabetes     Erectile dysfunction due to organic reasons                 PREVENTIVE HEALTH MAINTENANCE             EYE EXAM: was last done 4/24/19         Surgical History:         Cholecystectomy    Tonsillectomy/Adenoidectomy     L knee;    venous port - L chest;         Family History:         Positive for Coronary Artery Disease ( mother ) and Hypertension ( mother ).      Positive for Cancer- type not specified ( sister ).       "Positive for Asthma ( father ).      Positive for Type 2 Diabetes ( mother ).          Social History:     Occupation: Disabled (due to COPD)     Marital Status:      Children: 2 children         Tobacco/Alcohol/Supplements:     Last Reviewed on 12/23/2020 02:54 PM by Yessi Stockton    Tobacco: He has a past history of cigarette smoking; quit date:  1993.  Non-drinker         Substance Abuse History:     Last Reviewed on 4/09/2020 12:12 PM by Pawan Teresa        Mental Health History:     Last Reviewed on 4/09/2020 12:12 PM by Pawan Teresa        Communicable Diseases (eg STDs):     Last Reviewed on 4/09/2020 12:12 PM by Pawan Teresa        Allergies:     Last Reviewed on 12/01/2020 10:53 AM by Yessi Stockton    Benadryl:      Proventil: candidiasis  (Adverse Reaction)        Current Medications:     Last Reviewed on 12/23/2020 02:56 PM by Yessi Stockton    aspirin 81 mg oral tablet, delayed release (enteric coated) [take 1 tablet (81 mg) by oral route once daily]    Symbicort 160-4.5 mcg/actuation Inhalation HFA Aerosol Inhaler [inhale 2 puffs by inhalation route 2 times per day in the morning and evening]    loratadine 10 mg oral tablet [take 1 tablet (10 mg) by oral route once daily]    ipratropium-albuteroL 0.5 mg-3 mg(2.5 mg base)/3 mL Inhalation Solution for Nebulization [inhale 3 milliliters by nebulization route 4 times per day]    Accu-Chek SmartView Test Strips  [CHECK BLOOD SUGAR BEFORE MEALS AND AT BEDTIME]    BD Ultra-Fine Mini Pen Needle 31 gauge x 3/16\"  [use w/ trulicity & basaglar]    omeprazole 40 mg oral capsule,delayed release (enteric coated) [TAKE 1 CAPSULE(S) BY MOUTH DAILY]    albuterol sulfate 90 mcg/actuation Inhalation HFA Aerosol Inhaler [INHALE 2 PUFF(S) BY MOUTH 4 TIMES A DAY AS NEEDED]    Bydureon 2 mg/0.65 mL subcutaneous Pen Injector [Inject 2 mg subcutaneously q week]    furosemide 40 mg oral tablet [TAKE 1 TABLET BY MOUTH EVERY DAY]    Eliquis 5 mg oral tablet [TAKE 1 TABLET " BY MOUTH TWICE DAILY]    DULoxetine 60 mg oral capsule,delayed release (enteric coated) [1 po bid]    Basaglar KwikPen U-100 Insulin 100 unit/mL (3 mL) subcutaneous Insulin Pen [INJECT 40 UNITS UNDER THE SKIN EACH NIGHT AT BEDTIME]    Calcitrate 200 mg (950 mg) oral tablet [1T PO QD]    magnesium oxide 400 mg (241.3 mg magnesium) oral tablet [TAKE ONE TABLET THREE TIMES DAILY]    ferrous sulfate 325 mg (65 mg iron) oral tablet [take 1 tablet (325 mg) by oral route once daily]    lancets  [check blood sugar ac and hs E11.9]    blood pressure monitor kit  [patient to monitor b/p twice daily DX I 10]    atorvastatin 20 mg oral tablet [TAKE 1 TABLET BY MOUTH EVERY AT BEDTIME]    traZODone 50 mg oral tablet [take 1 tablet (50 mg) by oral route qhs]    metoprolol tartrate 100 mg oral tablet [take 2 tablets by oral route in the am and one tablet in the PM]    dilTIAZem HCl 120 mg oral Capsule, Extended Release 24 hr [take 1 capsule (120 mg) by oral route once daily]    hydrALAZINE 25 mg oral tablet [take 1 tablet (25 mg) by oral route 2 times per day with food]    tobramycin 28 mg Inhalation Capsule, With Inhalation Device [inhale the contents of 4 capsules (112 mg) by inhalation route every 12 hours for 28 days]    Admelog SoloStar U-100 Insulin 100 unit/mL subcutaneous Insulin Pen [CHECK BEFORE MEALS AND AT BEDTIME . IF BLOOD SUGAR < 150 -0 UNITS 151-180=1 UNITS, 181-210 =2 UNITS, 211-240 =3 UNITS, 241-270= 4 UNITS, 271-300=5 UNITS 301-330= 6 UNITS 331-390= 8 UNITS, >390 GIVE 9 UNITS]        Objective:        Vitals:         Current: 12/23/2020 3:53:32 PM    Ht:  5 ft, 9 inT: 98 F (temporal);  R: 18 bpm;  sCr: 1.13 mg/dL;  GFR: 61.95        Exams:     PHYSICAL EXAM:     GENERAL: Vitals recorded well developed, well nourished;  well groomed;  no apparent distress;     EYES: PERRL, EOMI     RESPIRATORY: normal respiratory rate and pattern with no distress;     NEUROLOGIC: mental status: oriented to person, place, and time;   GROSSLY INTACT     PSYCHIATRIC:  appropriate affect and demeanor; normal speech pattern; grossly normal memory;         Assessment:         E10.22   Type 1 diabetes mellitus with diabetic chronic kidney disease       M54.5   Low back pain       E66.01   Morbid (severe) obesity due to excess calories       J44.9   Chronic obstructive pulmonary disease, unspecified           Plan:         Type 1 diabetes mellitus with diabetic chronic kidney diseaseincrease basaglar to 63 units daily, call back with BS diary in 2 weeks, his diabetic dietician appt is set for Feb.    Telehealth: Verbal consent obtained for visit to occur via televideo conferencing; Staff, other than provider, present during telephone visit include only Dr Bourne was present during the telehealth OV with patient         Low back painstable, on chronic pain meds        Morbid (severe) obesity due to excess calorieswork on low carb diabetic diet, he is losing wt!!!        Chronic obstructive pulmonary disease, unspecifiedoverall stable adn doing well            Charge Capture:         Primary Diagnosis:     E10.22  Type 1 diabetes mellitus with diabetic chronic kidney disease           Orders:      [id number]  Office/outpatient visit; established patient, level 4  (In-House)              M54.5  Low back pain     E66.01  Morbid (severe) obesity due to excess calories     J44.9  Chronic obstructive pulmonary disease, unspecified

## 2021-05-18 NOTE — PROGRESS NOTES
Preston Wallis WANDER  1965     Office/Outpatient Visit    Visit Date: Mon, Nov 11, 2019 08:31 am    Provider: Kimmy Riley MD (Assistant: Lorin Lambert MA)    Location: [facility]        Electronically signed by Kimmy Riley MD on  11/12/2019 04:21:35 PM                             Subjective:        CC: f/u med refills    HPI:       chronic COPD, recent hospitalization for respiratory distress and COPD exacerbation, he is overall doing well, on 2 liters oxygen and is on breo           Dx with pure hypercholesterolemia, unspecified; current treatment includes Lipitor.  Compliance with treatment has been good; he takes his medication as directed and follows up as directed.  He denies experiencing any hypercholesterolemia related symptoms.            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  Compliance with treatment has been good; he takes his medication as directed and is keeping a glucose diary.  Primary symptoms reported include fatigue and peripheral neuropathy.  He specifically denies blurred vision, leg cramps, polydipsia, polyphagia, polyuria, weakness or yeast infections.  Depression screening is positive for anhedonia, altered sleep habits and feelings of worthlessness.      Tobacco screen: Non-smoker.  Current meds include an oral hypoglycemic ( Actos, Glucotrol, and trulicity ), insulin/injectable ( basaglar ), aspirin, and a lipid lowering agent.  He reports home blood glucose readings have averaged fasting readings in the 2-300 until last 2 days-130 and 120 mg/dL range. He checks his glucose 1 to 2 times daily.  Has not fallen recently In regard to preventative care, his last ophthalmology exam was in 10/2017-appt schedule 1/2019.        Gómez is in for his F2F for his chronic LBP and pain med refills, he is on   hydrocodone 10/325 bid and this med makes him  "functional and he is able to cope with his pain on this med. Pain is 8/10 without meds, worse due to change in weather, he also has PN pain in his feet B.   He tolerates meds well.   He is on permanent disability.  No signs of abuse or diversion.       chronic depression and he is worse, on cymbalta 30 mg and he feels he has no \"get up and go\", with anhydonia, and more anxiety and poor sleep at night.  No suicidal ideation, he is on trazodone    ROS:     CONSTITUTIONAL:  Negative for chills and fever.      EYES:  Negative for blurred vision.      E/N/T:  Positive for frequent rhinorrhea.   Negative for sore throat.      CARDIOVASCULAR:  Negative for chest pain, dizziness, palpitations and tachycardia.      RESPIRATORY:  Positive for chronic cough, dyspnea and frequent wheezing.      GASTROINTESTINAL:  Negative for abdominal pain, constipation, diarrhea, nausea and vomiting.      INTEGUMENTARY:  Negative for bruising.      NEUROLOGICAL:  Positive for dizziness.   Negative for headaches or weakness.      PSYCHIATRIC:  Positive for anxiety, depression and feelings of stress.   Negative for sleep disturbance or suicidal thoughts.          Past Medical History / Family History / Social History:         Last Reviewed on 9/18/2019 08:33 PM by Pawan Teresa    Past Medical History:     Use of high risk medications     MRSA pneumonia     Chronic low back pain     Closed fracture of other tarsal and metatarsal bones     Eczema     Low back pain     Type 2 diabetes     Chronic bronchitis, mucopurulent     Diabetes with neurological manifestations, type II or unspecified type, not stated as uncontrolled     Allergies     Bronchiectasis     COPD     Hypercholesterolemia     Type II DM     Hypertension     GERD     Peripheral neuropathy attributed to type II diabetes     Erectile dysfunction due to organic reasons                 PREVENTIVE HEALTH MAINTENANCE             EYE EXAM: was last done 4/24/19         Surgical History: "         Tonsillectomy/Adenoidectomy     L knee;    venous port - L chest;         Family History:         Positive for Coronary Artery Disease ( mother ) and Hypertension ( mother ).      Positive for Cancer- type not specified ( sister ).      Positive for Asthma ( father ).      Positive for Type 2 Diabetes ( mother ).          Social History:     Occupation: Disabled (due to COPD)     Marital Status:      Children: 2 children         Tobacco/Alcohol/Supplements:     Last Reviewed on 11/11/2019 08:39 AM by Lorin Lambert    Tobacco: He has a past history of cigarette smoking; quit date:  1993.  Non-drinker         Substance Abuse History:     Last Reviewed on 9/18/2019 08:33 PM by Pawan Teresa        Mental Health History:     Last Reviewed on 9/18/2019 08:33 PM by Pawan Teresa        Communicable Diseases (eg STDs):     Last Reviewed on 9/18/2019 08:33 PM by Pawan Teresa        Immunizations:     zzFluzone pf-quadrivalent 3 and up 10/18/2016    zzFluzone pf-quadrivalent 3 and up 10/16/2017    Fluzone pf (3+ years dose) 9/19/2013    Fluzone Quadrivalent (3+ years) 10/8/2018    Tdap (Tetanus, reduced diph, acellular pertussis) 9/18/2018        Allergies:     Last Reviewed on 9/18/2019 08:33 PM by Pawan Teresa    Benadryl:      Proventil: candidiasis  (Adverse Reaction)        Current Medications:     Last Reviewed on 9/18/2019 08:33 PM by Pawan Teresa    Glucose Reagent Blood Test Strips  Reagent Strips [Check  blood sugars AC HS. Dispense brand covered by insurance.e11.9]    Hydrocodone/Acetaminophen 10mg/325mg Tablet [One PO BID PRN]    Omeprazole 40mg Capsules, Extended Release [Take 1 capsule(s) by mouth daily]    Trazodone HCl 100mg Tablet [1 tab HS ]    Loratadine 10mg Tablet [Take 1 tablet(s) by mouth daily]    Ventolin HFA 90mcg/1actuation Oral Inhaler [Inhale 2 puff(s) by mouth 4 times a day as needed]    Cardizem CD  120mg Capsules, Extended Release [Take 1 capsule(s) by mouth daily]     "Metformin HCl 500mg Tablets, Extended Release [2 po bid]    Breo Ellipta 200mcg/25mcg Inhalation Powder [Take 1 inhalation(s) by mouth daily]    Basaglar KwikPen 100units/1ml Injection [inject 35units daily  DX  E11.9]    Losartan 50mg Tablet [Take 1 tablet(s) by mouth bid]    Metoprolol 50mg Tablet [1 tab bid]    Cymbalta 30mg Capsules, Delayed Release [1 capsule daily]    Gabapentin 400mg Capsules [1 TAB BID]    Hydrochlorothiazide (HCTZ) 12.5mg Tablet [Take 1 tablet(s) by mouth bid]    Eliquis 5mg Tablet [take 1 tab BID]    Trulicity PEN 1.5mg/0.5ml Injection [inject 1.5mg once a week]    Cefepime 1 gram IV  BID times 7 days        Objective:        Vitals:         Current: 11/11/2019 8:43:47 AM    Ht:  5 ft, 9 in;  Wt: 276.4 lbs;  BMI: 40.8T: 97.7 F (oral);  BP: 103/57 mm Hg (right arm, sitting);  P: 74 bpm (right arm (BP Cuff), sitting);  sCr: 1.11 mg/dL;  GFR: 87.39O2 Sat: 96 % (2 liters O2)        Exams:     PHYSICAL EXAM:     GENERAL: Vitals recorded morbidly obese;  no apparent distress;     EYES: conjunctiva and cornea are normal; PERRL, EOMI     RESPIRATORY: pips and squeks in apex; no rales (\"crackles\") present; no rhonchi; (+) expiratory wheezes;     CARDIOVASCULAR: normal rate; rhythm is regular;  No murmurs, clicks, gallops or rubs appreciated; no edema;     SKIN: No bruising of the chest wall or flank;     MUSCULOSKELETAL: Tenderness to palpation of the seventh eighth and ninth ribs.  Axillary line as well as tenderness to palpation of the second third and fourth ribs in the midclavicular line;     NEUROLOGIC: Grossly intact; mental status: alert and oriented x 3;     PSYCHIATRIC: appropriate affect and demeanor; normal speech pattern; Normal behavior;     Left foot exam    Protective sensation using Monofilament test: Loss of protective sensation. Approximately 4 grams of force is applied without sensory awareness.    Vascular status: normal peripheral vascular exam with palpable dorsal pedal and " posterior tibal pulses and brisk digital capillary refill    Skin is intact without sores or ulcers    Right foot exam    Protective sensation using Monofilament test: Decreased, with estimated force of 2 grams applied.    Vascular deficit noted in the dorsal pedal artery and the posterior tibial artery         Lab/Test Results:         Amphetamines Screen, Urin: Negative (11/11/2019),     BAR-Barbiturates Screen, Urin: Negative (11/11/2019),     Buprenorphine: Negative (11/11/2019),     BZO-Benzodiazepines Screen,Ur: Negative (11/11/2019),     Cocaine(Metab.)Screen, Ur: Negative (11/11/2019),     MDMA-Ecstasy: Negative (11/11/2019),     Met-Methamphetamine: Negative (11/11/2019),     MTD-Methadone Screen, Urine: Negative (11/11/2019),     Opiate Screen, Urine: Positive (11/11/2019),     OXY-Oxycodone: Negative (11/11/2019),     PCP-Phencyclidine Screen, Uri: Negative (11/11/2019),     THC Cannabinoids Screen, Urin: Negative (11/11/2019),     Urine temperature: confirmed (11/11/2019),     Date and time of last pill: hydrocodone 11/10/19 @ 1000pm, gabapentin 11/10/19 @ 1000pm   /mnp (11/11/2019),     Performed by: evelyne (11/11/2019),     Collection Time: 0859 (11/11/2019),             Assessment:         J44.9   Chronic obstructive pulmonary disease, unspecified       E78.00   Pure hypercholesterolemia, unspecified       I10   Essential (primary) hypertension       E10.49   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       K21.9   Gastro-esophageal reflux disease without esophagitis       M54.5   Low back pain       Z79.899   Other long term (current) drug therapy       E10.22   Type 1 diabetes mellitus with diabetic chronic kidney disease         N18.3 Chronic kidney disease, stage 3 (moderate)F33.0   Major depressive disorder, recurrent, mild           ORDERS:         Meds Prescribed:       [Queued Refill] Losartan 50 mg oral tablet [Take 1 tablet(s) by mouth bid], #180 (one hundred and  eighty) tablets, Refills: 0 (zero)       [Queued Refill] Ventolin HFA 90 mcg/actuation Inhalation HFA Aerosol Inhaler [Inhale 2 puff(s) by mouth 4 times a day as needed], #1 (one) inhaler, Refills: 0 (zero)       [Recorded] losartan 50 mg oral tablet [take 1 tablet (50 mg) by oral route once daily]       [Recorded] traZODone 150 mg oral tablet [take 1 tablet (150 mg) by oral route 2 times per day]       [Recorded] Cymbalta 60 mg oral capsule,delayed release (enteric coated) [take 1 capsule (60 mg) by oral route once daily]       [Recorded] fluticasone propionate 50 mcg/actuation Intranasal Spray, Suspension [inhale 1 spray (50 mcg) in each nostril by intranasal route 2 times per day]       [Refilled] losartan 50 mg oral tablet [take 1 tablet (50 mg) by oral route once daily], #90 (ninety) tablets, Refills: 1 (one)       [Refilled] traZODone 150 mg oral tablet [take 1 tablet (150 mg) by oral route qhs], #90 (ninety) tablets, Refills: 1 (one)       [Refilled] Cymbalta 60 mg oral capsule,delayed release (enteric coated) [take 1 capsule (60 mg) by oral route once daily], #90 (ninety) capsules, Refills: 1 (one)       [Refilled] fluticasone propionate 50 mcg/actuation Intranasal Spray, Suspension [inhale 1 spray (50 mcg) in each nostril by intranasal route 2 times per day], #16 (sixteen) grams, Refills: 5 (five)       [Refilled] losartan 50 mg oral tablet [take 1 tablet (50 mg) by oral route once daily], #90 (ninety) tablets, Refills: 1 (one)         Lab Orders:       98846  DIAB2 - [facility] CMP A1C LIPID AND MICRO ALBUM CR RATIO: [id number]  (Send-Out)            39611  Drug test prsmv qual dir optical obs per day  (In-House)              Other Orders:       2028F  Foot examination performed (includes examination through visual inspection, sensory exam with monofilament, and pulse exam - report when any of the three components are completed) (DM)4  (In-House)                      Plan:         Chronic  obstructive pulmonary disease, unspecifiedstable on meds and oxygen        Pure hypercholesterolemia, unspecifieddue for labs, not on statin          Prescriptions:       [Recorded] losartan 50 mg oral tablet [take 1 tablet (50 mg) by oral route once daily]       [Recorded] traZODone 150 mg oral tablet [take 1 tablet (150 mg) by oral route 2 times per day]       [Recorded] Cymbalta 60 mg oral capsule,delayed release (enteric coated) [take 1 capsule (60 mg) by oral route once daily]       [Recorded] fluticasone propionate 50 mcg/actuation Intranasal Spray, Suspension [inhale 1 spray (50 mcg) in each nostril by intranasal route 2 times per day]       [Refilled] losartan 50 mg oral tablet [take 1 tablet (50 mg) by oral route once daily], #90 (ninety) tablets, Refills: 1 (one)       [Refilled] traZODone 150 mg oral tablet [take 1 tablet (150 mg) by oral route qhs], #90 (ninety) tablets, Refills: 1 (one)       [Refilled] Cymbalta 60 mg oral capsule,delayed release (enteric coated) [take 1 capsule (60 mg) by oral route once daily], #90 (ninety) capsules, Refills: 1 (one)       [Refilled] fluticasone propionate 50 mcg/actuation Intranasal Spray, Suspension [inhale 1 spray (50 mcg) in each nostril by intranasal route 2 times per day], #16 (sixteen) grams, Refills: 5 (five)       [Refilled] losartan 50 mg oral tablet [take 1 tablet (50 mg) by oral route once daily], #90 (ninety) tablets, Refills: 1 (one)         Essential (primary) hypertensionBP low, will decrease losartan to 25 mg -he will take 1/2 pill daily        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stable on meds, due for labs, needs eye exam in April, wears diabetic shoes, on ARB, I recommend statin    LABORATORY:  Labs ordered to be performed today include Diabetes Panel 2;CMP, A1C, Lipid, Microalbumin:Creatinine Ratio.            Prescriptions:       [Queued Refill] Ventolin HFA 90 mcg/actuation Inhalation HFA Aerosol Inhaler [Inhale  2 puff(s) by mouth 4 times a day as needed], #1 (one) inhaler, Refills: 0 (zero)           Orders:       95720  DIAB2 - [facility] CMP A1C LIPID AND MICRO ALBUM CR RATIO: [id number]  (Send-Out)              Gastro-esophageal reflux disease without esophagitis        RECOMMENDATIONS given include: patient is aware of increased risk of kidney failure, osteoporosis and alzheimers with daily use of this med.          Low back painfunctional on pain meds, meds will be refilled on Friday        Other long term (current) drug therapy    LABORATORY:  Labs ordered to be performed today include Drug screen.            Orders:       63400  Drug test prsmv qual dir optical obs per day  (In-House)              Type 1 diabetes mellitus with diabetic chronic kidney diseaseas above        Major depressive disorder, recurrent, mildwill increase cymbalta to 60 mg and trazodone to 150 mg nightly          Prescriptions:       [Queued Refill] Losartan 50 mg oral tablet [Take 1 tablet(s) by mouth bid], #180 (one hundred and eighty) tablets, Refills: 0 (zero)             Other Orders      2028F  Foot examination performed (includes examination through visual inspection, sensory exam with monofilament, and pulse exam - report when any of the three components are completed) (DM)4  (In-House)              Charge Capture:         Primary Diagnosis:     J44.9  Chronic obstructive pulmonary disease, unspecified           Orders:      78453  Office/outpatient visit; established patient, level 4  (In-House)              E78.00  Pure hypercholesterolemia, unspecified     I10  Essential (primary) hypertension     E10.49  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     K21.9  Gastro-esophageal reflux disease without esophagitis     M54.5  Low back pain     Z79.899  Other long term (current) drug therapy           Orders:      88057  Drug test prsmv qual dir optical obs per day  (In-House)              E10.22   Type 1 diabetes mellitus with diabetic chronic kidney disease       N18.3 Chronic kidney disease, stage 3 (moderate)F33.0  Major depressive disorder, recurrent, mild         Other Orders:       2028F  Foot examination performed (includes examination through visual inspection, sensory exam with monofilament, and pulse exam - report when any of the three components are completed) (DM)4  (In-House)

## 2021-05-18 NOTE — PROGRESS NOTES
Preston Wallis  1965     Office/Outpatient Visit    Visit Date: Mon, Jan 27, 2020 09:27 am    Provider: Walter Fitzgerald MD (Assistant: Olesya García MA)    Location: [facility]        Electronically signed by Walter Fitzgerald MD on  01/27/2020 02:58:03 PM                             Subjective:        CC: Gómez is a 54 year old White male.  nausea, not been able to eat         HPI:           Patient to be evaluated for nausea with vomiting, unspecified.  vomiting preceeded by typical nausea This has been present for the past two days.  Associated symptoms include bloating and diarrhea.  He denies associated abdominal pain or constipation.  NAUSEA HAS IMPROVED BUT STILL HAS DIARRHEA     ROS:     CONSTITUTIONAL:  Negative for chills and fever.      EYES:  Negative for eye drainage.      E/N/T:  Negative for ear pain, nasal congestion and sore throat.      RESPIRATORY:  Negative for recent cough and dyspnea.      MUSCULOSKELETAL:  Negative for myalgias.      NEUROLOGICAL:  Negative for headaches.          Past Medical History / Family History / Social History:         Last Reviewed on 1/27/2020 09:53 AM by Walter Fitzgerald    Past Medical History:     Use of high risk medications     MRSA pneumonia     Chronic low back pain     Closed fracture of other tarsal and metatarsal bones     Eczema     Low back pain     Type 2 diabetes     Chronic bronchitis, mucopurulent     Diabetes with neurological manifestations, type II or unspecified type, not stated as uncontrolled     Allergies     Bronchiectasis     COPD     Hypercholesterolemia     Type II DM     Hypertension     GERD     Peripheral neuropathy attributed to type II diabetes     Erectile dysfunction due to organic reasons                 PREVENTIVE HEALTH MAINTENANCE             EYE EXAM: was last done 4/24/19         Surgical History:         Tonsillectomy/Adenoidectomy     L knee;    venous port - L chest;         Family  History:         Positive for Coronary Artery Disease ( mother ) and Hypertension ( mother ).      Positive for Cancer- type not specified ( sister ).      Positive for Asthma ( father ).      Positive for Type 2 Diabetes ( mother ).          Social History:     Occupation: Disabled (due to COPD)     Marital Status:      Children: 2 children         Tobacco/Alcohol/Supplements:     Last Reviewed on 1/27/2020 09:53 AM by Walter Fitzgerald    Tobacco: He has a past history of cigarette smoking; quit date:  1993.  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 without evidence of necrosis    Major depressive disorder, recurrent, mild    Unspecified fall, initial encounter    Fall NOS    Type 1 diabetes mellitus with diabetic chronic kidney disease    Chronic obstructive pulmonary disease with (acute) exacerbation    Rash and other nonspecific skin eruption    Encounter for follow-up examination after completed treatment for conditions other than malignant neoplasm    Other forms of dyspnea    Other pulmonary embolism without acute cor pulmonale    Pneumonia, unspecified organism    Essential (primary) hypertension    Encounter for screening for depression    Anemia, unspecified        Immunizations:     influenza, injectable, quadrivalent, preservative free 12/5/2019    zzFluzone  "pf-quadrivalent 3 and up 10/18/2016    zzFluzone pf-quadrivalent 3 and up 10/16/2017    Fluzone pf (3+ years dose) 9/19/2013    Fluzone Quadrivalent (3+ years) 10/8/2018    Tdap (Tetanus, reduced diph, acellular pertussis) 9/18/2018        Allergies:     Last Reviewed on 1/27/2020 09:53 AM by Walter Fitzgerald    Benadryl:      Proventil: candidiasis  (Adverse Reaction)        Current Medications:     Last Reviewed on 1/27/2020 09:53 AM by Walter Fitzgerald    metoprolol tartrate 50 mg oral tablet [take 1 tablet (50 mg) by oral route 2 times per day with meals]    atorvastatin 20 mg oral tablet [take 1 tablet (20 mg) by oral route once daily]    Glucose Reagent Blood Test Strips  Reagent Strips [Check  blood sugars AC HS. Dispense brand covered by insurance.[id number]]    HYDROcodone-acetaminophen  mg oral tablet [One PO BID PRN]    BD Ultra-Fine Mini Pen Needle 31 gauge x 3/16\"  [use w/ trulicity & basaglar]    omeprazole 40 mg oral capsule,delayed release (enteric coated) [Take 1 capsule(s) by mouth daily]    Loratadine 10 mg oral tablet [Take 1 tablet(s) by mouth daily]    Ventolin HFA 90 mcg/actuation Inhalation HFA Aerosol Inhaler [Inhale 2 puff(s) by mouth 4 times a day as needed]    dilTIAZem HCl 120 mg oral Capsule, Extended Release 24 hr [TAKE 1 CAPSULE BY MOUTH EVERY DAY]    metFORMIN 500 mg oral Tablet, Extended Release 24 hr [TAKE 2 TABLETS BY MOUTH TWICE DAILY]    Breo Ellipta 200mcg/25mcg Inhalation Powder [Take 1 inhalation(s) by mouth daily]    furosemide 40 mg oral tablet [TAKE 1 TABLET BY MOUTH EVERY DAY]    hydroCHLOROthiazide 12.5 mg oral capsule [TAKE 1 CAPSULE BY MOUTH TWICE DAILY]    Gabapentin 400 mg oral capsule [1 TAB BID]    Eliquis 5mg Tablet [take 1 tab BID]    Trulicity PEN 1.5mg/0.5ml Injection [inject 1.5mg once a week]    losartan 50 mg oral tablet [take one tablet daily]    traZODone 150 mg oral tablet [take 1 tablet (150 mg) by oral route qhs]    Cymbalta 60 mg oral " capsule,delayed release (enteric coated) [take 1 capsule (60 mg) by oral route once daily]    ipratropium-albuterol 0.5 mg-3 mg(2.5 mg base)/3 mL Inhalation Solution for Nebulization [inhale 3 milliliters by nebulization route 4 times per day as needed]    Lotrisone 1-0.05 % Topical Cream [apply to the affected and surrounding areas of skin by topical route 2 times per day in the morning and evening]    AMLODIPINE 5MG Tablets  [TAKE 1 TABLET BY MOUTH ONCE DAILY]    BASAGLAR INJ 100UNIT Milliliters  [INJECT 40 UNITS UNDER THE SKIN EACH NIGHT AT BEDTIME]    potassium chloride 10 mEq oral capsule, extended release [take 1 capsule (10 meq) daily ]        Objective:        Vitals:         Current: 1/27/2020 9:37:02 AM    Ht:  5 ft, 9 in;  Wt: 270 lbs (Estimated);  BMI: 39.9T: 98.3 F (oral);  BP: 158/75 mm Hg (left arm, sitting);  P: 82 bpm (left arm (BP Cuff), sitting);  sCr: 1.53 mg/dL;  GFR: 62.77        Exams:     PHYSICAL EXAM:     GENERAL: Vitals recorded well developed, well nourished;  well groomed;  no apparent distress;     EYES: conjunctiva and cornea are normal;     E/N/T:  normal EACs, TMs, nasal/oral mucosa, teeth, gingiva, and oropharynx;     RESPIRATORY: normal respiratory rate and pattern with no distress; normal breath sounds with no rales, rhonchi, wheezes or rubs;     CARDIOVASCULAR: normal rate; rhythm is regular;  normal S1; normal S2; no systolic murmur; no cyanosis; no edema;     GASTROINTESTINAL: nontender, nondistended; no hepatosplenomegaly or masses; no bruits;     LYMPHATIC: no enlargement of cervical or facial nodes; no supraclavicular nodes;     NEUROLOGIC: GROSSLY INTACT         Assessment:         K52.89   Other specified noninfective gastroenteritis and colitis           ORDERS:         Meds Prescribed:       [New Rx] Zofran 8 mg oral tablet [ODT FORM.  ONE Q SIX HOURS PRN N/V], #12 (twelve) tablets, Refills: 0 (zero)                 Plan:         Other specified noninfective  gastroenteritis and colitis        MEDICATIONS: Over-the-counter medications recommended include Immodium-AD.      RECOMMENDATIONS given include: get plenty of rest, maintain a clear liquid diet, resume intake of solid foods gradually, and Go to the ER if worse.      FOLLOW-UP: Schedule follow-up appointments on a p.r.n. basis.  Consider further workup           Prescriptions:       [New Rx] Zofran 8 mg oral tablet [ODT FORM.  ONE Q SIX HOURS PRN N/V], #12 (twelve) tablets, Refills: 0 (zero)             Patient Recommendations:        For  Other specified noninfective gastroenteritis and colitis:    Get plenty of rest.     Maintain a diet consisting of clear liquids (clear beverages, broth, gelatin, flavored ices, pediatric electrolyte solutions, sports drinks, etc.).  Avoid solid foods or formula. After 4-8 hours have passed without vomiting, you may gradually advance your diet to include bland foods, such as saltine crackers or bread, or dilute formula.  Schedule follow-up appointments as needed.              Charge Capture:         Primary Diagnosis:     K52.89  Other specified noninfective gastroenteritis and colitis           Orders:      [id number]  Office/outpatient visit; established patient, level 3  (In-House)

## 2021-05-18 NOTE — PROGRESS NOTES
BimalPrestonCamilo 1965     Office/Outpatient Visit    Visit Date: [place], Aug 6, 2019 03:08 pm    Provider: Kimmy Riley MD (Assistant: Olesya García MA)    Location: [facility]        Electronically signed by Kimmy Riley MD on  08/12/2019 11:16:20 AM                             SUBJECTIVE:        CC:     Gómez is a 54 year old White male.  He is here today following a transition of care from an inpatient hospital: [facility]. The patient was admitted on 7-29-19 and discharged on 8-3-19. The patient was admitted for acute on respiratory failure and large pulmonary embolus. Our office called the patient within 48 hours of discharge and scheduled the follow-up appointment. During the patient's hospital stay the patient was treated by .          HPI:     Gómez is in today for his PRATIBHA eval, he was admitted on 7/29 for acute hypoxic and hypercarbic respiratory failure requiring ICU placement and intubation.  He was dx with B pneumonia due to MRSA.  He also had a R PE, type 2 non ST elevated MI and acute on chronic COPD exacerbation.  While in the hospital he was treated with empiric heparin, and started on IV steroids, neb treatments, empiric antibiotics.  When his sputum grew out MRSA, it was sensitive to levaquin so he was treated with levaquin. and his respiratory status improved quickly and he was able to be extubated.  He did have worsening renal function while in the hospital.  He was started on xarelto for the PE.  He was also slowly weaned off O2 and sent home off oxygen.  He was in the hospital for 6 days and d/c home on new meds xarelto, levaquin and prednisone.  He will have home health for SN/PT/OT.  He was consulted with DR Da Silva cardiology while ith the hospital.      ROS:     CONSTITUTIONAL:  Positive for fatigue ( mild ).   Negative for chills or fever.      EYES:  Negative for blurred vision.      CARDIOVASCULAR:  Negative for chest pain, palpitations and pedal edema.       RESPIRATORY:  Positive for recent cough, dyspnea ( with mild exertion ) and frequent wheezing.   Negative for pleuritic chest pain.      GASTROINTESTINAL:  Negative for abdominal pain, constipation, diarrhea, hematochezia, melena, nausea and vomiting.      MUSCULOSKELETAL:  Positive for back pain.   Negative for arthralgias or myalgias.      INTEGUMENTARY:  Negative for rash.      NEUROLOGICAL:  Negative for headaches, memory loss and weakness.          PMH/FMH/SH:     Last Reviewed on 2/27/2019 04:37 PM by Kimmy Riley    Past Medical History:     Use of high risk medications     MRSA pneumonia     Chronic low back pain     Closed fracture of other tarsal and metatarsal bones     Eczema     Low back pain     Type 2 diabetes     Chronic bronchitis, mucopurulent     Diabetes with neurological manifestations, type II or unspecified type, not stated as uncontrolled     Allergies     Bronchiectasis     COPD     Hypercholesterolemia     Type II DM     Hypertension     GERD     Peripheral neuropathy attributed to type II diabetes     Erectile dysfunction due to organic reasons                 PREVENTIVE HEALTH MAINTENANCE             EYE EXAM: was last done 4/24/19         Surgical History:         Tonsillectomy/Adenoidectomy      L knee;    venous port - L chest;         Family History:         Positive for Coronary Artery Disease ( mother ) and Hypertension ( mother ).      Positive for Cancer- type not specified ( sister ).      Positive for Asthma ( father ).      Positive for Type 2 Diabetes ( mother ).          Social History:     Occupation: Disabled (due to COPD)     Marital Status:      Children: 2 children         Tobacco/Alcohol/Supplements:     Last Reviewed on 7/29/2019 03:57 PM by Cindy Lopez    Tobacco: He has a past history of cigarette smoking; quit date:  1993.  Non-drinker         Substance Abuse History:     Last Reviewed on 5/22/2013 03:43 PM by Jorgito Ames             "Allergies:     Last Reviewed on 7/29/2019 03:54 PM by Cindy Lopez    Benadryl:    Proventil: candidiasis (Adverse Reaction)        Current Medications:     Last Reviewed on 7/29/2019 03:54 PM by Cindy Lopez    Losartan 50mg Tablet Take 1 tablet(s) by mouth bid     Hydrocodone/Acetaminophen 10mg/325mg Tablet One PO BID PRN     Hydrochlorothiazide (HCTZ) 12.5mg Tablet Take 1 tablet(s) by mouth bid     Omeprazole 40mg Capsules, Extended Release Take 1 capsule(s) by mouth daily     Trazodone HCl 100mg Tablet 1 tab HS     Amitriptyline HCl 25mg Tablet Take 1 tablet(s) by mouth at bedtime     Cardizem CD  120mg Capsules, Extended Release Take 1 capsule(s) by mouth daily     Cymbalta 30mg Capsules, Delayed Release 1 capsule daily     Metformin HCl 500mg Tablets, Extended Release 2 po bid     Loratadine 10mg Tablet Take 1 tablet(s) by mouth daily     Glucose Reagent Blood Test Strips  Reagent Strips Check  blood sugars AC HS. Dispense brand covered by insurance.e11.9     Bydureon 2mg/1pen Injection Suspension, Extended-Release Inject 2 mg subcutaneously q week     ProAir HFA 90mcg/1actuation Oral Inhaler Inhale 2 puff(s) by mouth q 4 to 6 hr prn.     Furosemide 40mg Tablets 1 by mouth  daily     Potassium Chloride 10mEq Capsules, Extended Release 1 daily prn with lasix use     Atorvastatin Calcium 20mg Tablet 1 po q hs     BD Ultra-Fine Mini Pen Needle 31G x 3/16\"  Pen Needle use dalily with bydureon and lantus as directed     Basaglar KwikPen 100units/1ml Injection inject 35units daily  DX  E11.9     Eliquis 5mg Tablet take 1 tab BID     Dulera 200mcg/5mcg Oral Inhaler 2 PUFFS BID     Metoprolol 50mg Tablet 1 tab bid         OBJECTIVE:        Vitals:         Current: 8/6/2019 3:27:13 PM    Ht:  5 ft, 9 in;  Wt: 293 lbs;  BMI: 43.3    T: 98.2 F (oral);  BP: 153/58 mm Hg (left arm, sitting);  P: 85 bpm (left arm (BP Cuff), sitting);  sCr: 0.91 mg/dL;  GFR: 109.27    O2 Sat: 92 %        Exams:     PHYSICAL EXAM:     " GENERAL: Vitals recorded well groomed;  no apparent distress;     E/N/T: OROPHARYNX:  normal mucosa, dentition, gingiva, and posterior pharynx;     NECK:  supple, full ROM; no thyromegaly; no carotid bruits;     RESPIRATORY: normal appearance and symmetric expansion of chest wall; normal respiratory rate and pattern with no distress; expiratory wheezes in the mild;     CARDIOVASCULAR: normal rate; rhythm is regular;  normal S1; normal S2; no systolic murmur; no cyanosis; no edema;     NEUROLOGIC: mental status: oriented to person, place, and time;  GROSSLY INTACT     skin-bruising arms and hands B with hematoma in L wrist.     PSYCHIATRIC: affect/demeanor: flat;  normal psychomotor function; speech pattern: flat;  normal thought and perception;         Lab/Test Results:             Urine temperature:  could not confirm (08/06/2019),     All urine drug screen levels confirmed negative:  yes (08/06/2019),     Date and time of last pill:  hydrocodone 8-6-19 @ 9am/ael (08/06/2019),     Performed by:  tls (08/06/2019),     Collection Time:  1625 (08/06/2019),             ASSESSMENT           518.82   J80  Acute respiratory distress, NEC              DDx:     491.21   J44.9  Decompensated chronic obstructive pulmonary disease (COPD)              DDx:     296.31   F33.0  Major depression, recurrent episode, mild              DDx:     724.2   M54.5  Chronic low back pain              DDx:     401.1   I10  Hypertension              DDx:     250.01   E10.65  IDDM              DDx:     Neurologic disorder associated with type II diabetes mellitus    250.60   E10.49  Peripheral neuropathy attributed to type II diabetes              DDx:     V58.69   Z79.899  Use of high risk medications              DDx:         ORDERS:         Meds Prescribed:       Refill of: Gabapentin 400mg Capsules 1 TAB BID  #60 (Sixty) capsule(s) Refills: 2       Refill of: Losartan 50mg Tablet Take 1 tablet(s) by mouth bid  #180 (One [place] and Eighty)  tablet(s) Refills: 0       Refill of: Hydrocodone/Acetaminophen 10mg/325mg Tablet One PO BID PRN  #60 (Sixty) tablet(s) Refills: 0       Refill of: Hydrochlorothiazide (HCTZ) 12.5mg Tablet Take 1 tablet(s) by mouth bid  #180 (One [place] and Eighty) tablet(s) Refills: 0       Refill of: Omeprazole 40mg Capsules, Extended Release Take 1 capsule(s) by mouth daily  #90 (Ninety) capsule(s) Refills: 0       Refill of: Trazodone HCl 100mg Tablet 1 tab HS  #90 (Ninety) tablet(s) Refills: 0       Refill of: Cardizem CD  (Diltiazem HCl) 120mg Capsules, Extended Release Take 1 capsule(s) by mouth daily  #90 (Ninety) capsule(s) Refills: 0       Refill of: Cymbalta (Duloxetine HCl) 30mg Capsules, Delayed Release 1 capsule daily  #90 (Ninety) capsule(s) Refills: 0       Refill of: Metformin HCl 500mg Tablets, Extended Release 2 po bid  #360 (Three [place] and Sixty) tablet(s) Refills: 0       Refill of: Loratadine 10mg Tablet Take 1 tablet(s) by mouth daily  #90 (Ninety) tablet(s) Refills: 0       Refill of: Bydureon (Exenatide) 2mg/1pen Injection Suspension, Extended-Release Inject 2 mg subcutaneously q week  #4 (Four) each Refills: 2       Refill of: Furosemide 40mg Tablets 1 by mouth  daily  #30 (Thirty) tablet(s) Refills: 1       Refill of: Potassium Chloride 10mEq Capsules, Extended Release 1 daily prn with lasix use  #30 (Thirty) capsule(s) Refills: 1       Refill of: Atorvastatin Calcium 20mg Tablet 1 po q hs  #90 (Ninety) tablet(s) Refills: 0       Refill of: Basaglar KwikPen (Insulin Glargine (rDNA)) 100units/1ml Injection inject 35units daily  DX  E11.9  #5 (Five) prefilled pen Refills: 5       Refill of: Eliquis (Apixaban) 5mg Tablet take 1 tab BID  #60 (Sixty) tablet(s) Refills: 2       Refill of: Dulera (Mometasone Furoate/Formoterol Fumarate) 200mcg/5mcg Oral Inhaler 2 PUFFS BID  #1 (One) inhaler(s) Refills: 2       Refill of: Metoprolol 50mg Tablet 1 tab bid  #60 (Sixty) tablet(s) Refills: 2         Lab Orders:        [id number]  Drug test prsmv read direct optical obs pr date  (In-House)         11260  [facility] [id number] OPIATES  (Send-Out)           Other Orders:       [id number]  Noninvasive ear or pulse oximetry for oxygen saturation; single determination  (In-House)                   PLAN:          Acute respiratory distress, NEC complete levaquin and prednisone and f/u with Dr Da Silva           Prescriptions:       Refill of: Losartan 50mg Tablet Take 1 tablet(s) by mouth bid  #180 (One [place] and Eighty) tablet(s) Refills: 0       Refill of: Hydrocodone/Acetaminophen 10mg/325mg Tablet One PO BID PRN  #60 (Sixty) tablet(s) Refills: 0       Refill of: Hydrochlorothiazide (HCTZ) 12.5mg Tablet Take 1 tablet(s) by mouth bid  #180 (One [place] and [place]) tablet(s) Refills: 0       Refill of: Omeprazole 40mg Capsules, Extended Release Take 1 capsule(s) by mouth daily  #90 (Ninety) capsule(s) Refills: 0       Refill of: Trazodone HCl 100mg Tablet 1 tab HS  #90 (Ninety) tablet(s) Refills: 0       Refill of: Cardizem CD  (Diltiazem HCl) 120mg Capsules, Extended Release Take 1 capsule(s) by mouth daily  #90 (Ninety) capsule(s) Refills: 0       Refill of: Cymbalta (Duloxetine HCl) 30mg Capsules, Delayed Release 1 capsule daily  #90 (Ninety) capsule(s) Refills: 0       Refill of: Metformin HCl 500mg Tablets, Extended Release 2 po bid  #360 (Three [place] and Sixty) tablet(s) Refills: 0       Refill of: Loratadine 10mg Tablet Take 1 tablet(s) by mouth daily  #90 (Ninety) tablet(s) Refills: 0       Refill of: Bydureon (Exenatide) 2mg/1pen Injection Suspension, Extended-Release Inject 2 mg subcutaneously q week  #4 (Four) each Refills: 2       Refill of: Furosemide 40mg Tablets 1 by mouth  daily  #30 (Thirty) tablet(s) Refills: 1       Refill of: Potassium Chloride 10mEq Capsules, Extended Release 1 daily prn with lasix use  #30 (Thirty) capsule(s) Refills: 1       Refill of: Atorvastatin Calcium 20mg Tablet 1 po q hs  #90 (Ninety) tablet(s)  Refills: 0       Refill of: Basaglar KwikPen (Insulin Glargine (rDNA)) 100units/1ml Injection inject 35units daily  DX  E11.9  #5 (Five) prefilled pen Refills: 5       Refill of: Eliquis (Apixaban) 5mg Tablet take 1 tab BID  #60 (Sixty) tablet(s) Refills: 2       Refill of: Dulera (Mometasone Furoate/Formoterol Fumarate) 200mcg/5mcg Oral Inhaler 2 PUFFS BID  #1 (One) inhaler(s) Refills: 2       Refill of: Metoprolol 50mg Tablet 1 tab bid  #60 (Sixty) tablet(s) Refills: 2           Orders:       [id number]  Noninvasive ear or pulse oximetry for oxygen saturation; single determination  (In-House)            Decompensated chronic obstructive pulmonary disease (COPD) back to baseline, off O2, complete steroids and levaquin and cont dulera and ventolin and home nebs          Major depression, recurrent episode, mild well controlled          Chronic low back pain stable on pain meds          Hypertension elevated          IDDM he is now back on insulin, he is to forward me his BS diary.          Peripheral neuropathy attributed to type II diabetes worse, he did better on gabapentin 400 mg for the pain, but he felt like he was seeing things while in the hospital, but he was hypoxic while in hospital and on high dose steroid, so since he is home in a safe and observing environment with his wife, I am ok increasing the gabapentin           Prescriptions:       Refill of: Gabapentin 400mg Capsules 1 TAB BID  #60 (Sixty) capsule(s) Refills: 2          Use of high risk medications     LABORATORY:  Labs ordered to be performed today include Drug Screen Urine [facility] reviewed, drug screen performed and appropriate, consent is reviewed and signed and on the chart, he is aware of risk of addiction on this medication and understands that he will need to follow up for a review every 3 months and his medications will be adjusted or decreased as deemed appropriate at each visit.  No personal history of drug or  alcohol abuse.  No concerns about diversion or abuse.  He denies side effects related to the medication.  He is  aware that she may be called in for pill counts           Orders:       [id number]  Drug test prsmv read direct optical obs pr date  (In-House)         01501  [facility] [id number] OPIATES  (Send-Out)               CHARGE CAPTURE           **Please note: ICD descriptions below are intended for billing purposes only and may not represent clinical diagnoses**        Primary Diagnosis:         518.82 Acute respiratory distress, NEC            J80    Acute respiratory distress syndrome              Orders:          [id number]   Transitional care manage service 7 day discharge  (In-House)             00742   Noninvasive ear or pulse oximetry for oxygen saturation; single determination  (In-House)           491.21 Decompensated chronic obstructive pulmonary disease (COPD)            J44.9    Chronic obstructive pulmonary disease, unspecified    296.31 Major depression, recurrent episode, mild            F33.0    Major depressive disorder, recurrent, mild    724.2 Chronic low back pain            M54.5    Low back pain    401.1 Hypertension            I10    Essential (primary) hypertension    250.01 IDDM            E10.65    Type 1 diabetes mellitus with hyperglycemia    Neurologic disorder associated with type II diabetes mellitus    250.60 Peripheral neuropathy attributed to type II diabetes            E10.49    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    V58.69 Use of high risk medications            Z79.899    Other long term (current) drug therapy              Orders:          [id number]   Drug test prsmv read direct optical obs pr date  (In-House)               ADDENDUMS:      ____________________________________    Addendum: 08/21/2019 09:56 AM - Tarhsa Aranda         Visit Note Faxed to:

## 2021-05-18 NOTE — PROGRESS NOTES
Preston Wallis  1965     Office/Outpatient Visit    Visit Date: Mon, Sep 21, 2020 12:30 pm    Provider: iKmmy Riley MD (Assistant: Andrea Beckford, )    Location: [facility]        Electronically signed by Kimmy Riley MD on  09/24/2020 10:36:45 AM                             Subjective:        CC: (NOT TAKING TRAZODONE)tachycardia    HPI:       Gómez is in today for a PRATIBHA from a recent ER visit, he is seeing home health and they noticed he had a HR > 130 so he was referred to ER, HR in ER came down to 98 and he was sent home, labs were overall OK with normal WBC, his breathing is stable for him with no acute issues today, he is very thirsty and not drinking very much, his BS was >400 in the ER.  He states he is taking his insulin lantus 50 units daily and SSI but he is only eating 1 meal a day.   He is noncompliant with checking his BS daily.  He is also on bydureon.  He states he is also concerned about his days and nights being all mixed up and he is not sleeping very much.          Type 1 diabetes mellitus with diabetic chronic kidney disease details; specifically, this is type 1 diabetes, complicated by nephropathy and peripheral neuropathy.  Compliance with treatment has been poor; he skips some insulin doses due to forgetfulness and inconvenience of dosing and does not follow a diet and exercise regimen.      Tobacco screen: Non-smoker.  He does not perform home blood glucose monitoring.  Has not fallen recently In regard to preventative care, his last ophthalmology exam was in 4/2019.      ROS:     CONSTITUTIONAL:  Negative for chills and fever.      EYES:  Negative for blurred vision and eye pain.      CARDIOVASCULAR:  Negative for chest pain, orthopnea, paroxysmal nocturnal dyspnea and pedal edema.      RESPIRATORY:  Positive for persistent cough ( typically dry ).   Negative for dyspnea, hemoptysis or frequent wheezing.      GASTROINTESTINAL:  Negative for abdominal pain,  heartburn, constipation, diarrhea, and stool changes.      INTEGUMENTARY:  Negative for rash.      NEUROLOGICAL:  Negative for dizziness and headaches.      PSYCHIATRIC:  Negative for anxiety, depression, and sleep disturbances.          Past Medical History / Family History / Social History:         Last Reviewed on 9/21/2020 01:40 PM by Kimmy Riley    Past Medical History:     Use of high risk medications     MRSA pneumonia     Chronic low back pain     Closed fracture of other tarsal and metatarsal bones     Eczema     Low back pain     Type 2 diabetes     Chronic bronchitis, mucopurulent     Diabetes with neurological manifestations, type II or unspecified type, not stated as uncontrolled     Allergies     Bronchiectasis     COPD     Hypercholesterolemia     Type II DM     Hypertension     GERD     Peripheral neuropathy attributed to type II diabetes     Erectile dysfunction due to organic reasons                 PREVENTIVE HEALTH MAINTENANCE             EYE EXAM: was last done 4/24/19         Surgical History:         Cholecystectomy    Tonsillectomy/Adenoidectomy     L knee;    venous port - L chest;         Family History:         Positive for Coronary Artery Disease ( mother ) and Hypertension ( mother ).      Positive for Cancer- type not specified ( sister ).      Positive for Asthma ( father ).      Positive for Type 2 Diabetes ( mother ).          Social History:     Occupation: Disabled (due to COPD)     Marital Status:      Children: 2 children         Tobacco/Alcohol/Supplements:     Last Reviewed on 9/21/2020 12:33 PM by Andrea Beckford    Tobacco: He has a past history of cigarette smoking; quit date:  1993.  Non-drinker         Substance Abuse History:     Last Reviewed on 4/09/2020 12:12 PM by Pawan Teresa        Mental Health History:     Last Reviewed on 4/09/2020 12:12 PM by Pawan Teresa        Communicable Diseases (eg STDs):     Last Reviewed on 4/09/2020 12:12 PM by Malick  "Pawan        Allergies:     Last Reviewed on 9/08/2020 12:17 PM by Sosa Domínguez:      Proventil: candidiasis  (Adverse Reaction)        Current Medications:     Last Reviewed on 9/08/2020 12:47 PM by Kimmy Riley    Symbicort 160-4.5 mcg/actuation Inhalation HFA Aerosol Inhaler [inhale 2 puffs by inhalation route 2 times per day in the morning and evening]    dilTIAZem HCl 240 mg oral Capsule, Extended Release 24 hr [take 1 capsule (240 mg) by oral route once daily]    hydrALAZINE 25 mg oral tablet [take 1 tablet (25 mg) by oral route 2 times per day with food]    insulin glargine 100 unit/mL subcutaneous Solution [inject by subcutaneous 50 units every morning]    insulin lispro 100 unit/mL subcutaneous Insulin Pen [inject by subcutaneous route 0-6 units as needed for high blood sugar]    metoprolol tartrate 50 mg oral tablet [take 1 tablet (50 mg) by oral route 2 times per day with meals]    aspirin 81 mg oral tablet, delayed release (enteric coated) [take 1 tablet (81 mg) by oral route once daily]    Accu-Chek SmartView Test Strips  [CHECK BLOOD SUGAR BEFORE MEALS AND AT BEDTIME]    BD Ultra-Fine Mini Pen Needle 31 gauge x 3/16\"  [use w/ trulicity & basaglar]    omeprazole 40 mg oral capsule,delayed release (enteric coated) [TAKE 1 CAPSULE(S) BY MOUTH DAILY]    albuterol sulfate 90 mcg/actuation Inhalation HFA Aerosol Inhaler [INHALE 2 PUFF(S) BY MOUTH 4 TIMES A DAY AS NEEDED]    Bydureon 2 mg/0.65 mL subcutaneous Pen Injector [Inject 2 mg subcutaneously q week]    Eliquis 5 mg oral tablet [1T PO BID]    DULoxetine 60 mg oral capsule,delayed release (enteric coated) [1 po bid]    Lotrisone 1-0.05 % Topical Cream [apply to the affected and surrounding areas of skin by topical route 2 times per day in the morning and evening]    Calcitrate 200 mg (950 mg) oral tablet [1T PO QD]    magnesium oxide 400 mg (241.3 mg magnesium) oral tablet [TAKE ONE TABLET THREE TIMES DAILY]    ferrous " sulfate 325 mg (65 mg iron) oral tablet [take 1 tablet (325 mg) by oral route once daily]    lancets  [check blood sugar ac and hs E11.9]    blood pressure monitor kit  [patient to monitor b/p twice daily DX I 10]    atorvastatin 20 mg oral tablet [TAKE 1 TABLET BY MOUTH EVERY AT BEDTIME]    traZODone 50 mg oral tablet [take 1 tablet (50 mg) by oral route qhs]    famotidine 40 mg oral tablet [take 1 tablet (40 mg) by oral route daily]        Objective:        Vitals:         Current: 9/21/2020 12:37:05 PM    Ht:  5 ft, 9 in;  Wt: 266 lbs (Estimated);  BMI: 39.3T: 97.1 F (temporal);  BP: 144/81 mm Hg (left arm, sitting);  P: 120 bpm (left arm (BP Cuff), sitting);  sCr: 1.07 mg/dL;  GFR: 88.18        Repeat:     12:38:4 PM  BP:   156/83mm Hg (left arm, sitting) 12:38:13 PM  P:   127bpm (left arm (BP Cuff), sitting)     Exams:     PHYSICAL EXAM:     GENERAL: Vitals recorded well developed, well nourished;  well groomed;  no apparent distress;     EYES: PERRL, EOMI     E/N/T: EARS:  normal external auditory canals and tympanic membranes;  grossly normal hearing; OROPHARYNX:  normal mucosa, dentition, gingiva, and posterior pharynx;     NECK: range of motion is normal; thyroid is non-palpable; carotid exam is normal with good upstroke and no bruits; supple;     RESPIRATORY: normal respiratory rate and pattern with no distress; expiratory wheezes in the apices;     CARDIOVASCULAR: regular rate and rhythm; normal S1, S2; no murmur, rub, or gallop; normal PMI;     GASTROINTESTINAL: nontender; normal bowel sounds;     MUSCULOSKELETAL: gait: in wheel chair-refuses to walk but he can with a walker;     NEUROLOGIC: mental status: oriented to person, place, and time;  GROSSLY INTACT     PSYCHIATRIC: affect/demeanor: flat;  normal psychomotor function; speech pattern: flat;  normal thought and perception;         Lab/Test Results:         LABORATORY RESULTS: EKG performed by tls/ pt sitting for ekg     Glucose capillary: 418  (09/21/2020),     Hemoglobin A1c: 11.6 (09/21/2020),     Performed by:: evelyne (09/21/2020),             Assessment:         R00.0   Tachycardia, unspecified       G47.00   Insomnia, unspecified       E10.22   Type 1 diabetes mellitus with diabetic chronic kidney disease       Z23   Encounter for immunization           ORDERS:         Meds Prescribed:       [Refilled] metoprolol tartrate 100 mg oral tablet [take 1 tablet (100 mg) by oral route 2 times per day], #60 (sixty) tablets, Refills: 2 (two)       [Refilled] traZODone 50 mg oral tablet [take 1 tablet (50 mg) by oral route qhs], #90 (ninety) tablets, Refills: 0 (zero)         Radiology/Test Orders:       [id number]  Electrocardiogram, routine with at least 12 leads; with interpretation and report  (In-House)              Lab Orders:       [id number]*  Glucose quantitative inhouse  (In-House)            37697*  Hgb A1c fast lab  (In-House)              Procedures Ordered:       [id number]  Immunization administration; one vaccine  (In-House)            87500  Collection of capillary blood specimen (eg, finger, heel, ear stick)  (In-House)              Other Orders:       [id number]  Influenza virus vaccine, quadrivalent, split virus, preservative free 3 years of age & older  (In-House)                      Plan:         Tachycardia, unspecifiedsinus tachycardia due to poorly controlled diabetes, will try to get his diabetes under better control, he is to push fluids 64 ounces a day water, take his SSI as prescribed +2 units QAC and QHS, increase his basaglar to 53 units a day, call in 3 days with his BS diary.        TESTS/PROCEDURES:  Will proceed with an ECG to be performed/scheduled now.            Prescriptions:       [Refilled] metoprolol tartrate 100 mg oral tablet [take 1 tablet (100 mg) by oral route 2 times per day], #60 (sixty) tablets, Refills: 2 (two)           Orders:       [id number]  Electrocardiogram, routine with at least 12 leads; with interpretation and report   (In-House)              Insomnia, unspecifiedworse, will restart him on his trazodone          Prescriptions:       [Refilled] traZODone 50 mg oral tablet [take 1 tablet (50 mg) by oral route qhs], #90 (ninety) tablets, Refills: 0 (zero)         Type 1 diabetes mellitus with diabetic chronic kidney diseasesee above    LABORATORY:  Labs ordered to be performed today include Glucose by glucometer and Hgb A1c inhouse fast lab.            Orders:       [id number]*  Glucose quantitative inhouse  (In-House)            35443*  Hgb A1c fast lab  (In-House)            57605  Collection of capillary blood specimen (eg, finger, heel, ear stick)  (In-House)              Encounter for immunization          Immunizations:       30668  Immunization administration; one vaccine  (In-House)            75981  Influenza virus vaccine, quadrivalent, split virus, preservative free 3 years of age & older  (In-House)                Dose (ml): 0.5  Site: right deltoid  Route: intramuscular  Administered by: Andrea Beckford          : [employer]  Lot #: [id number]  Exp: 06/30/2021          NDC: [address]-76            Charge Capture:         Primary Diagnosis:     R00.0  Tachycardia, unspecified           Orders:      46598  Office/outpatient visit; established patient, level 4  (In-House)            62895  Electrocardiogram, routine with at least 12 leads; with interpretation and report  (In-House)              G47.00  Insomnia, unspecified     E10.22  Type 1 diabetes mellitus with diabetic chronic kidney disease           Orders:      24718*  Glucose quantitative inhouse  (In-House)            04554*  Hgb A1c fast lab  (In-House)            90338  Collection of capillary blood specimen (eg, finger, heel, ear stick)  (In-House)              Z23  Encounter for immunization           Orders:      10258  Immunization administration; one vaccine  (In-House)            43677  Influenza virus vaccine, quadrivalent, split virus,  preservative free 3 years of age & older  (In-House)

## 2021-05-18 NOTE — PROGRESS NOTES
Preston Wallis  1965     Office/Outpatient Visit    Visit Date: Tue, Feb 11, 2020 09:14 am    Provider: Kimmy Riley MD (Assistant: Lorin Lambert MA)    Location: [facility]        Electronically signed by Kimmy Riley MD on  02/12/2020 11:50:21 AM                             Subjective:        CC: Gómez is a 54 year old White male.  This is a follow-up visit.  face to face for chronic LBP        HPI:       Gómez is doing his F2F for his chronic LBP and PN pain, he is on gabapentin and  hydrocodone 10/325 bid scheduled and he is tolerating meds well and functional on pain meds, pain is 8/10 without meds, but 3-4/10 and tolerable with meds.  He is disabled due to his Low back issues.  He shows no signs of abuse or diversion.      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  specifically, this is type 1 diabetes, complicated by nephropathy and peripheral neuropathy.  Compliance with treatment has been poor; he skips some insulin doses due to forgetfulness and inconvenience of dosing and does not follow a diet and exercise regimen.      Tobacco screen: Non-smoker.  Current meds include an oral hypoglycemic ( Actos, Glucotrol, and trulicity ), insulin/injectable ( basaglar 35 units nightly ), aspirin, and a lipid lowering agent.  He reports home blood glucose readings have averaged fasting readings in the 2-300 until last 2 days-130 and 120 mg/dL range. He checks his glucose 1 to 2 times daily.  Most recent lab results include Hemoglobin A1c:  10.1 (%) (11/11/2019), Creatinine, Serum:  1.53 (mg/dl) (11/11/2019), Glom Filt Rate, Est:  51 (ml/min/1.73m2) (11/11/2019), Hematocrit:  37.7 (%) (09/03/2019), Hemoglobin:  11.60 (gm/dl) (09/03/2019),  Alkaline Phosphatase:  136 (U/L) (11/11/2019), ALT (SGPT):  12 (U/L) (11/11/2019), AST (SGOT):  18 (U/L) (11/11/2019), HDL:  37 (mg/dL) (11/11/2019), LDL:  58 (mg/dL) (11/11/2019), Microalbumin, Urine, rand:  926.0 (mg/L) (11/11/2019), Total Cholesterol:  129 (mg/dL) (11/11/2019), Triglycerides:  172 (mg/dL) (11/11/2019).  Has not fallen recently In regard to preventative care, his last ophthalmology exam was in 4/2019.            Concerning essential (primary) hypertension, his current cardiac medication regimen includes an angiotensin receptor blocker ( Cozaar ).  He is tolerating the medication well without side effects.  Compliance with treatment has been good; he takes his medication as directed and follows up as directed.      ROS:     CONSTITUTIONAL:  Positive for fatigue.   Negative for chills or fever.      CARDIOVASCULAR:  Negative for chest pain, orthopnea, paroxysmal nocturnal dyspnea and pedal edema.      RESPIRATORY:  Positive for recent cough, dyspnea ( with moderate exertion ) and frequent wheezing.   Negative for hemoptysis or cough.      GASTROINTESTINAL:  Negative for abdominal pain, heartburn, constipation, diarrhea, and stool changes.      INTEGUMENTARY:  Positive for rash (chronic recurring).      NEUROLOGICAL:  Positive for weakness ( generalized ).          Past Medical History / Family History / Social History:         Last Reviewed on 2/11/2020 09:31 AM by Kimmy Riley    Past Medical History:     Use of high risk medications     MRSA pneumonia     Chronic low back pain     Closed fracture of other tarsal and metatarsal bones     Eczema     Low back pain     Type 2 diabetes     Chronic bronchitis, mucopurulent     Diabetes with neurological manifestations, type II or unspecified type, not stated as uncontrolled     Allergies     Bronchiectasis     COPD     Hypercholesterolemia     Type II DM     Hypertension     GERD     Peripheral neuropathy attributed to type II diabetes      "Erectile dysfunction due to organic reasons                 PREVENTIVE HEALTH MAINTENANCE             EYE EXAM: was last done 4/24/19         Surgical History:         Cholecystectomy    Tonsillectomy/Adenoidectomy     L knee;    venous port - L chest;         Family History:         Positive for Coronary Artery Disease ( mother ) and Hypertension ( mother ).      Positive for Cancer- type not specified ( sister ).      Positive for Asthma ( father ).      Positive for Type 2 Diabetes ( mother ).          Social History:     Occupation: Disabled (due to COPD)     Marital Status:      Children: 2 children         Tobacco/Alcohol/Supplements:     Last Reviewed on 2/11/2020 09:20 AM by Lorin Lambert    Tobacco: He has a past history of cigarette smoking; quit date:  1993.  Non-drinker         Substance Abuse History:     Last Reviewed on 9/18/2019 08:33 PM by Pawan Teresa        Mental Health History:     Last Reviewed on 9/18/2019 08:33 PM by Pawan Teresa        Communicable Diseases (eg STDs):     Last Reviewed on 9/18/2019 08:33 PM by Pawan Teresa        Allergies:     Last Reviewed on 1/27/2020 09:53 AM by Walter Fitzgerald    Benjessiryl:      Proventil: candidiasis  (Adverse Reaction)        Current Medications:     Last Reviewed on 2/11/2020 09:21 AM by Lorin Lmabert    metoprolol tartrate 50 mg oral tablet [take 1 tablet (50 mg) by oral route 2 times per day with meals]    atorvastatin 20 mg oral tablet [take 1 tablet (20 mg) by oral route once daily]    Glucose Reagent Blood Test Strips  Reagent Strips [Check  blood sugars AC HS. Dispense brand covered by insurance.e11.9]    HYDROcodone-acetaminophen  mg oral tablet [One PO BID PRN]    BD Ultra-Fine Mini Pen Needle 31 gauge x 3/16\"  [use w/ trulicity & basaglar]    omeprazole 40 mg oral capsule,delayed release (enteric coated) [Take 1 capsule(s) by mouth daily]    loratadine 10 mg oral tablet [TAKE 1 TABLET(S) BY MOUTH DAILY]    " albuterol sulfate 90 mcg/actuation Inhalation HFA Aerosol Inhaler [INHALE 2 PUFF(S) BY MOUTH 4 TIMES A DAY AS NEEDED]    dilTIAZem HCl 120 mg oral Capsule, Extended Release 24 hr [TAKE 1 CAPSULE BY MOUTH EVERY DAY]    atorvastatin 20 mg oral tablet [TAKE 1  TABLET PO Q HS]    Breo Ellipta 200mcg/25mcg Inhalation Powder [Take 1 inhalation(s) by mouth daily]    furosemide 40 mg oral tablet [1T PO QD]    hydroCHLOROthiazide 12.5 mg oral capsule [TAKE 1 CAPSULE BY MOUTH TWICE DAILY]    Gabapentin 400 mg oral capsule [1 TAB BID]    Eliquis 5mg Tablet [take 1 tab BID]    Trulicity PEN 1.5mg/0.5ml Injection [inject 1.5mg once a week]    losartan 50 mg oral tablet [take one tablet daily]    traZODone 150 mg oral tablet [take 1 tablet (150 mg) by oral route qhs]    Cymbalta 60 mg oral capsule,delayed release (enteric coated) [take 1 capsule (60 mg) by oral route once daily]    ipratropium-albuterol 0.5 mg-3 mg(2.5 mg base)/3 mL Inhalation Solution for Nebulization [inhale 3 milliliters by nebulization route 4 times per day as needed]    Lotrisone 1-0.05 % Topical Cream [apply to the affected and surrounding areas of skin by topical route 2 times per day in the morning and evening]    AMLODIPINE 5MG Tablets  [TAKE 1 TABLET BY MOUTH ONCE DAILY]    BASAGLAR INJ 100UNIT Milliliters  [INJECT 40 UNITS UNDER THE SKIN EACH NIGHT AT BEDTIME]    potassium chloride 10 mEq oral capsule, extended release [take 1 capsule (10 meq) daily ]    Zofran 8 mg oral tablet [ODT FORM.  ONE Q SIX HOURS PRN N/V]    metFORMIN 500 mg oral Tablet, Extended Release 24 hr [2T PO BID]        Objective:        Vitals:         Current: 2/11/2020 9:24:36 AM    Ht:  5 ft, 9 in;  Wt: 280 lbs;  BMI: 41.3T: 97.6 F (oral);  BP: 141/62 mm Hg (left arm, sitting);  P: 76 bpm (left arm (BP Cuff), sitting);  sCr: 1.53 mg/dL;  GFR: 63.75O2 Sat: 93 % (2 liters O2)        Exams:     PHYSICAL EXAM:     GENERAL: Vitals recorded well developed, well nourished;  no apparent  distress, tired-appearing;     NECK:  supple, full ROM; no thyromegaly; no carotid bruits;     RESPIRATORY: normal respiratory rate and pattern with no distress; expiratory wheezes in the VINCENT and RUL;     CARDIOVASCULAR: normal rate; rhythm is regular;  normal S1; normal S2; no systolic murmur; no cyanosis; no edema;     LYMPHATIC: no enlargement of cervical or facial nodes;     MUSCULOSKELETAL:  Normal range of motion, strength and tone;     NEUROLOGICAL:  cranial nerves, motor and sensory function, reflexes, gait and coordination are all intact;     PSYCHIATRIC:  appropriate affect and demeanor; normal speech pattern; grossly normal memory;         Lab/Test Results:         Amphetamines Screen, Urin: Negative (02/11/2020),     BAR-Barbiturates Screen, Urin: Negative (02/11/2020),     Buprenorphine: Negative (02/11/2020),     BZO-Benzodiazepines Screen,Ur: Negative (02/11/2020),     Cocaine(Metab.)Screen, Ur: Negative (02/11/2020),     MDMA-Ecstasy: Negative (02/11/2020),     Met-Methamphetamine: Negative (02/11/2020),     MTD-Methadone Screen, Urine: Negative (02/11/2020),     Opiate Screen, Urine: Positive (02/11/2020),     OXY-Oxycodone: Negative (02/11/2020),     PCP-Phencyclidine Screen, Uri: Negative (02/11/2020),     THC Cannabinoids Screen, Urin: Negative (02/11/2020),     Urine temperature: confirmed (02/11/2020),     Date and time of last pill: hydrocode 2/10/20 @ 9pm, gabapentin 2/11/20 @ 630am   /mnp (02/11/2020),     Performed by: tls (02/11/2020),     Collection Time: 0945 (02/11/2020),     Hemoglobin A1c: 11.8 (02/11/2020),     Performed by:: pr (02/11/2020),             Assessment:         M54.5   Low back pain       Z79.899   Other long term (current) drug therapy       G47.33   Obstructive sleep apnea (adult) (pediatric)       J44.9   Chronic obstructive pulmonary disease, unspecified       F33.0   Major depressive disorder, recurrent, mild       E10.22   Type 1 diabetes mellitus with diabetic  chronic kidney disease       I10   Essential (primary) hypertension       D64.9   Anemia, unspecified           ORDERS:         Meds Prescribed:       [Refilled] losartan 100 mg oral tablet [take 1 tablet (100 mg) by oral route once daily], #90 (ninety) tablets, Refills: 1 (one)         Lab Orders:       [id number]*  Hgb A1c fast lab  (In-House)            [id number]  Drug test prsmv read direct optical obs pr date  (In-House)              Procedures Ordered:       21411  Collection of capillary blood specimen (eg, finger, heel, ear stick)  (In-House)                      Plan:         Low back painstable and functional on pain meds         Other long term (current) drug therapy    christine reviewed, drug screen performed and appropriate, consent is reviewed and signed and on the chart, he is aware of risk of addiction on this medication and understands that he will need to follow up for a review every 3 months and his medications will be adjusted or decreased as deemed appropriate at each visit.  No personal history of drug or alcohol abuse.  No concerns about diversion or abuse.  He denies side effects related to the medication.  He is  aware that she may be called in for pill counts           Orders:       [id number]  Drug test prsmv read direct optical obs pr date  (In-House)              Obstructive sleep apnea (adult) (pediatric)not on cpap, doesnt tolerate it=I will refer him back to Dr Chavez        Chronic obstructive pulmonary disease, unspecifiedhe needs follow up with Dr Chavez        Major depressive disorder, recurrent, mildwell controlled        Type 1 diabetes mellitus with diabetic chronic kidney diseasepoorly controlled, he is on metformin and basaglar and trulicity, he is non compliant with his insulin, I d/w him how serious this is to his health and how he is increasing his risk of MI and stroke everyday he has high BSs, so he and his wife agree for him to be compliant with his meds and to call me in one week  with his BS readings with basaglar 35 units.    LABORATORY:  Labs ordered to be performed today include Hgb A1c inhouse fast lab.            Orders:       [id number]*  Hgb A1c fast lab  (In-House)            70614  Collection of capillary blood specimen (eg, finger, heel, ear stick)  (In-House)              Essential (primary) hypertensionborderline, will increase the diltiazem          Prescriptions:       [Refilled] losartan 100 mg oral tablet [take 1 tablet (100 mg) by oral route once daily], #90 (ninety) tablets, Refills: 1 (one)         Anemia, unspecifiedstable            Charge Capture:         Primary Diagnosis:     M54.5  Low back pain           Orders:      [id number]  Office/outpatient visit; established patient, level 4  (In-House)              Z79.899  Other long term (current) drug therapy           Orders:      [id number]  Drug test prsmv read direct optical obs pr date  (In-House)              G47.33  Obstructive sleep apnea (adult) (pediatric)     J44.9  Chronic obstructive pulmonary disease, unspecified     F33.0  Major depressive disorder, recurrent, mild     E10.22  Type 1 diabetes mellitus with diabetic chronic kidney disease           Orders:      21933*  Hgb A1c fast lab  (In-House)            41237  Collection of capillary blood specimen (eg, finger, heel, ear stick)  (In-House)              I10  Essential (primary) hypertension     D64.9  Anemia, unspecified

## 2021-05-18 NOTE — PROGRESS NOTES
Preston Wallis [place] 1965     Office/Outpatient Visit    Visit Date: Wed, Jan 9, 2019 08:41 am    Provider: Kimmy Riley MD (Assistant: Sarah Spurling, MA)    Location: [facility]        Electronically signed by Kimmy Riley MD on  01/09/2019 05:49:25 PM                             SUBJECTIVE:        CC: ongoing URI symptoms         HPI:         Dx with hypercholesterolemia; current treatment includes Lipitor.  Compliance with treatment has been good; he takes his medication as directed and follows up as directed.  He denies experiencing any hypercholesterolemia related symptoms.      Gómez is in for his chronic pain med refills for is PN foot pain and LBP, he is on hydrocodone 10/325 bid, occasional tid, he wants more pain meds but I told him I would have to get him in to pain management to increase this dosing sched and he defers.   He is functional with his pain meds, tolerates meds well and denies confusion/weakness.  He is on permanent disability.  No signs of abuse or diversion.          Additionally, he presents with history of hypertension.  his current cardiac medication regimen includes an ACE inhibitor ( Zestril ).  He is tolerating the medication well without side effects.  Compliance with treatment has been good; he takes his medication as directed and follows up as directed.          Dx with iDDM; specifically, this is type 1 diabetes, complicated by peripheral neuropathy.  Compliance with treatment has been good; he takes his medication as directed and is keeping a glucose diary.  Primary symptoms reported include fatigue and peripheral neuropathy.  He specifically denies blurred vision, leg cramps, polydipsia, polyphagia, polyuria, weakness or yeast infections.      Tobacco screen: Non-smoker.  Current meds include an oral hypoglycemic ( Actos, Glucotrol, and bydureon ), insulin/injectable ( basaglar ), aspirin, and a lipid lowering agent.  He does not perform home blood glucose  monitoring.  Most recent lab results include Hemoglobin:  12.80 (g/dl) (01/30/2018), Hematocrit:  38.4 (%) (01/30/2018), Creatinine, Serum:  0.91 (mg/dl) (10/09/2018), Glom Filt Rate, Est:  >60 (ml/min/1.73m2) (10/09/2018), Alkaline Phosphatase, Serum:  160 (U/L) (10/09/2018), ALT (SGPT):  22 (U/L) (10/09/2018), AST (SGOT):  34 (U/L) (10/09/2018), Total Cholesterol:  137 (mg/dL) (10/09/2018), HDL:  41 (mg/dL) (10/09/2018), Triglycerides:  179 (mg/dL) (10/09/2018), LDL:  60 (mg/dL) (10/09/2018), Hemoglobin A1c:  7.6 (%) (10/09/2018).  Has not fallen recently In regard to preventative care, his last ophthalmology exam was in 10/2017-appt schedule 1/2019.      ROS:     CONSTITUTIONAL:  Negative for chills, fatigue and fever.      EYES:  Negative for blurred vision.      E/N/T:  Positive for nasal congestion and frequent rhinorrhea.   Negative for sore throat.      CARDIOVASCULAR:  Positive for dizziness ( off and on (chronic problem) ).   Negative for chest pain or pedal edema.      RESPIRATORY:  Positive for chronic cough, dyspnea, pleuritic chest pain and frequent wheezing.      GASTROINTESTINAL:  Positive for nausea and vomiting.   Negative for abdominal pain, constipation, diarrhea, heartburn, hematochezia or melena.      MUSCULOSKELETAL:  Positive for back pain.   Negative for arthralgias or myalgias.      INTEGUMENTARY:  Negative for rash.      ALLERGIC/IMMUNOLOGIC:  Positive for seasonal allergies.          PMH/FMH/SH:     Last Reviewed on 1/09/2019 09:05 AM by Kimmy Riley    Past Medical History:     Use of high risk medications     MRSA pneumonia     Chronic low back pain     Closed fracture of other tarsal and metatarsal bones     Eczema     Low back pain     Type 2 diabetes     Chronic bronchitis, mucopurulent     Diabetes with neurological manifestations, type II or unspecified type, not stated as uncontrolled     Allergies     Bronchiectasis     COPD     Hypercholesterolemia     Type II DM      Hypertension     GERD     Peripheral neuropathy attributed to type II diabetes     Erectile dysfunction due to organic reasons             Surgical History:         Tonsillectomy/Adenoidectomy      L knee;    venous port - L chest;         Family History:         Positive for Coronary Artery Disease ( mother ) and Hypertension ( mother ).      Positive for Cancer- type not specified ( sister ).      Positive for Asthma ( father ).      Positive for Type 2 Diabetes ( mother ).          Social History:     Occupation: Disabled (due to COPD)     Marital Status:      Children: 2 children         Tobacco/Alcohol/Supplements:     Last Reviewed on 1/09/2019 09:05 AM by Kimmy Riley    Tobacco: He has a past history of cigarette smoking; quit date:  1993.  Non-drinker         Substance Abuse History:     Last Reviewed on 5/22/2013 03:43 PM by Jorgito Ames            Allergies:     Last Reviewed on 12/31/2018 12:28 PM by Yessi Stockton    Proventil: candidiasis (Adverse Reaction)        Current Medications:     Last Reviewed on 12/31/2018 12:29 PM by Yessi Stockton    Cardizem CD  120mg Capsules, Extended Release Take 1 capsule(s) by mouth daily     Hydrochlorothiazide (HCTZ) 12.5mg Tablet 1 po daily     Metformin HCl 500mg Tablets, Extended Release 2 po bid     ProAir HFA 90mcg/1actuation Oral Inhaler Inhale 2 puff(s) by mouth q 4 to 6 hr prn.     Hydrocodone/Acetaminophen 10mg/325mg Tablet One PO BID PRN     Actos 45mg Tablet Take 1 tablet(s) by mouth daily     Amitriptyline HCl 25mg Tablet Take 1 tablet(s) by mouth at bedtime     Trazodone HCl 100mg Tablet 1 tab HS     Loratadine 10mg Tablet Take 1 tablet(s) by mouth daily     Omeprazole 40mg Capsules, Extended Release Take 1 capsule(s) by mouth daily     Basaglar KwikPen 100units/1ml Injection Inject 15 units daily Dx E11.9     Bydureon 2mg/1pen Injection Suspension, Extended-Release Inject 2 mg subcutaneously q week     BD Insulin  "Syringe 1ml Syringe Use with Lantus at HS dx e11.9     Symbicort 160mcg/4.5mcg Oral Inhaler 1 puff BID     Breo Ellipta 200mcg/25mcg Inhalation Powder Take 1 inhalation(s) by mouth daily     Albuterol 0.083% Nebulizer Solution 1 vial(s) by nebulizer qid as directed     Montelukast Sodium 10mg Tablet 1 tab daily     BD Ultra-Fine Mini Pen Needle 31G x 3/16\"  Pen Needle Use TID with insulin.     Aspirin (ASA) 81mg Tablets, Enteric Coated 1 tab daily     Mucinex 600mg Tablets, Extended Release 1 bid prn     Prednisone 10mg Tablet take 6 pills today, 5 pills tomorrow, 4 pills day 3, 3 pills day 4, 2 pills day 5 and 1 pill day 6     Promethazine HCl 25mg Tablet 1/2 - 1 tab q 4-6 hrs prn   prn nausea and vomiting     Tessalon Perles 100mg Capsules One PO Q 8 hours PRN cough     Atorvastatin Calcium 20mg Tablet 1 po q hs         OBJECTIVE:        Vitals:         Current: 1/9/2019 8:53:48 AM    Ht:  5 ft, 9 in;  Wt: 283.2 lbs;  BMI: 41.8    T: 97.4 F (oral);  BP: 142/82 mm Hg (left arm, sitting);  P: 100 bpm (left arm (BP Cuff), standing);  sCr: 0.91 mg/dL;  GFR: 108.91        Exams:     PHYSICAL EXAM:     GENERAL: Vitals recorded well developed, well nourished;  well groomed;  no apparent distress;     EYES: PERRL, EOMI     E/N/T: OROPHARYNX:  normal mucosa, dentition, gingiva, and posterior pharynx;     NECK:  supple, full ROM; no thyromegaly; no carotid bruits;     RESPIRATORY: normal appearance and symmetric expansion of chest wall; normal respiratory rate and pattern with no distress; rhonchi heard in the mild and lower lobes B with mild expiratory wheezing, good BS to bases B;     CARDIOVASCULAR: normal rate; rhythm is regular;  normal S1; normal S2; no systolic murmur; no cyanosis; no edema;     GASTROINTESTINAL: nontender, nondistended; no hepatosplenomegaly or masses; no bruits;     MUSCULOSKELETAL: gait: affected by a limp and unsteady;  decreased ROM R shoulder;     NEUROLOGIC: mental status: oriented to person, " place, and time;  GROSSLY INTACT     PSYCHIATRIC: appropriate affect and demeanor;         Lab/Test Results:             Amphetamines Screen, Urin:  Negative (01/09/2019),     BAR-Barbiturates Screen, Urin:  Negative (01/09/2019),     Buprenorphine:  Negative (01/09/2019),     BZO-Benzodiazepines Screen,Ur:  Negative (01/09/2019),     Cocaine(Metab.)Screen, Ur:  Negative (01/09/2019),     MDMA-Ecstasy:  Negative (01/09/2019),     Met-Methamphetamine:  Negative (01/09/2019),     MTD-Methadone Screen, Urine:  Negative (01/09/2019),     Opiate Screen, Urine:  Positive (01/09/2019),     OXY-Oxycodone:  Negative (01/09/2019),     PCP-Phencyclidine Screen, Uri:  Negative (01/09/2019),     THC Cannabinoids Screen, Urin:  Negative (01/09/2019),     Urine temperature:  confirmed (01/09/2019),     Date and time of last pill:  hydrocodone 1-9-18 @ 730 (01/09/2019),     Performed by:  [facility] (01/09/2019),     Collection Time:  0856 (01/09/2019),             ASSESSMENT           465.8   J06.9  URI              DDx:     272.0   E78.00  Hypercholesterolemia              DDx:     250.60   E10.49  Peripheral neuropathy attributed to type II diabetes              DDx:     724.2   M54.5  Chronic low back pain              DDx:     491.21   J44.1  Acute exacerbation of chronic obstructive pulmonary disease (COPD)              DDx:     401.1   I10  Hypertension              DDx:     250.01   E10.65  IDDM              DDx:     V58.69   Z79.899  Use of high risk medications              DDx:         ORDERS:         Meds Prescribed:       Refill of: Bydureon (Exenatide) 2mg/1pen Injection Suspension, Extended-Release Inject 2 mg subcutaneously q week  #4 (Four) each Refills: 2       Prednisone 5mg Tablet 8 pills day 1, 6 pills day 2, 4 pills on day three, 2 pills on day 4, and 1 pill on day 5  #21 (Twenty One) tablet(s) Refills: 0         Lab Orders:       02029  Drug test prsmv read direct optical obs pr date  (In-House)         81074  [facility]  - [facility] Sputum Culture  (Send-Out)                   PLAN:          URI recent exposure to flu, seen last week, wife positive for flu B, his test was neg, he still feels SOA with productive cough that went from clear to yellow 3 days ago     LABORATORY:  Labs ordered to be performed today include sputum culture.            Orders:       [id number]  [facility] - [facility] Sputum Culture  (Send-Out)            Hypercholesterolemia stable          Peripheral neuropathy attributed to type II diabetes stable with pain meds          Chronic low back pain stable on chronic pain meds          Acute exacerbation of chronic obstructive pulmonary disease (COPD) will repeat steroid dose pack and check sputum culture           Prescriptions:       Refill of: Bydureon (Exenatide) 2mg/1pen Injection Suspension, Extended-Release Inject 2 mg subcutaneously q week  #4 (Four) each Refills: 2       Prednisone 5mg Tablet 8 pills day 1, 6 pills day 2, 4 pills on day three, 2 pills on day 4, and 1 pill on day 5  #21 (Twenty One) tablet(s) Refills: 0          Hypertension stable          IDDM 12 # wt loss, he is doing much better with his diet.          Use of high risk medications         RECOMMENDATIONS given include: christine reviewed, drug screen performed and appropriate, consent is reviewed and signed and on the chart, she is aware of risk of addiction on this medication and understands that she will need to follow up for a review every 3 months and her medications will be adjusted or decreased as deemed appropriate at each visit.  No personal history of drug or alcohol abuse.  No concerns about diversion or abuse.  She denies side effects related to the medication.  She is  aware that she may be called in for pill counts..            Orders:       02913  Drug test prsmv read direct optical obs pr date  (In-House)               CHARGE CAPTURE           **Please note: ICD descriptions below are intended for billing purposes only and may not represent  clinical diagnoses**        Primary Diagnosis:         465.8 URI            J06.9    Acute upper respiratory infection, unspecified              Orders:          06293   Office/outpatient visit; established patient, level 4  (In-House)           272.0 Hypercholesterolemia            E78.00    Pure hypercholesterolemia, unspecified    250.60 Peripheral neuropathy attributed to type II diabetes            E10.49    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    724.2 Chronic low back pain            M54.5    Low back pain    491.21 Acute exacerbation of chronic obstructive pulmonary disease (COPD)            J44.1    Chronic obstructive pulmonary disease with (acute) exacerbation    401.1 Hypertension            I10    Essential (primary) hypertension    250.01 IDDM            E10.65    Type 1 diabetes mellitus with hyperglycemia    V58.69 Use of high risk medications            Z79.899    Other long term (current) drug therapy              Orders:          71175   Drug test prsmv read direct optical obs pr date  (In-House)

## 2021-05-18 NOTE — PROGRESS NOTES
Preston Wallis 1965     Office/Outpatient Visit    Visit Date: Mon, Jul 29, 2019 03:49 pm    Provider: Talisha Chaudhry N.P. (Assistant: Cindy Lopez MA)    Location: [facility]        Electronically signed by Talisha Chaudhry N.P. on  07/29/2019 05:54:03 PM                             SUBJECTIVE:        CC:     Gómez is a 54 year old White male.  presents today due to complaints of cough and SOB X 1 week         HPI:         Patient complains of upper respiratory illness.  These have been present for the past one week.  The symptoms include chest congestion, productive cough, shortness of breath and fatigue.  He denies fever.  He has already tried to relieve the symptoms with Mucinex D and Albuterol.  Medical history is significant for COPD.      ROS:     CONSTITUTIONAL:  Positive for fatigue.   Negative for fever.      EYES:  Negative for blurred vision and eye drainage.      E/N/T:  Negative for ear pain and sore throat.      CARDIOVASCULAR:  Negative for chest pain and palpitations.      RESPIRATORY:  Positive for recent cough, dyspnea and frequent wheezing.          PMH/FM/:     Last Reviewed on 2/27/2019 04:37 PM by Kimym Riley    Past Medical History:     Use of high risk medications     MRSA pneumonia     Chronic low back pain     Closed fracture of other tarsal and metatarsal bones     Eczema     Low back pain     Type 2 diabetes     Chronic bronchitis, mucopurulent     Diabetes with neurological manifestations, type II or unspecified type, not stated as uncontrolled     Allergies     Bronchiectasis     COPD     Hypercholesterolemia     Type II DM     Hypertension     GERD     Peripheral neuropathy attributed to type II diabetes     Erectile dysfunction due to organic reasons                 PREVENTIVE HEALTH MAINTENANCE             EYE EXAM: was last done 4/24/19         Surgical History:         Tonsillectomy/Adenoidectomy      L knee;    venous port - L chest;         Family  History:         Positive for Coronary Artery Disease ( mother ) and Hypertension ( mother ).      Positive for Cancer- type not specified ( sister ).      Positive for Asthma ( father ).      Positive for Type 2 Diabetes ( mother ).          Social History:     Occupation: Disabled (due to COPD)     Marital Status:      Children: 2 children         Tobacco/Alcohol/Supplements:     Last Reviewed on 3/28/2019 11:40 AM by Lorin Lambert    Tobacco: He has a past history of cigarette smoking; quit date:  1993.  Non-drinker         Substance Abuse History:     Last Reviewed on 5/22/2013 03:43 PM by Jorgito Ames            Current Problems:     Last Reviewed on 10/29/2018 03:18 PM by Lilo Preciado    Essential hypertension, benign     Use of high risk medications     Major depression, recurrent episode, mild     Dehydration     MRSA pneumonia     Acute exacerbation of chronic obstructive pulmonary disease (COPD)     Foot ulcer     Hypertension     Decompensated chronic obstructive pulmonary disease (COPD)     Dizziness     IDDM     Decompensated chronic obstructive pulmonary disease (COPD) with exacerbation     Light-headedness     Obstructive sleep apnea     Chronic bronchitis, obstructive, with (acute) exacerbation     Microscopic hematuria     Severe obesity     Unspecified closed foot fracture     Ulcer of toes     Chronic low back pain     Eczema     Diabetes with neurological manifestations, type II or unspecified type, not stated as uncontrolled     Allergies     Bronchiectasis     COPD     Hypercholesterolemia     GERD     Peripheral neuropathy attributed to type II diabetes     Erectile dysfunction due to organic reasons     Hypotension, other     Follow-up examination         Immunizations:     zzFluzone pf-quadrivalent 3 and up 10/18/2016     zzFluzone pf-quadrivalent 3 and up 10/16/2017     Fluzone pf (3+ years dose) 9/19/2013     Fluzone Quadrivalent (3+ years) 10/8/2018     Tdap  "(Tetanus, reduced diph, acellular pertussis) 9/18/2018         Allergies:     Last Reviewed on 3/28/2019 11:40 AM by Lorin Lambertryl:    Proventil: candidiasis (Adverse Reaction)        Current Medications:     Last Reviewed on 3/28/2019 11:40 AM by Lorin Lambert    Losartan 50mg Tablet Take 1 tablet(s) by mouth bid     Hydrocodone/Acetaminophen 10mg/325mg Tablet One PO BID PRN     Hydrochlorothiazide (HCTZ) 12.5mg Tablet Take 1 tablet(s) by mouth bid     Omeprazole 40mg Capsules, Extended Release Take 1 capsule(s) by mouth daily     Trazodone HCl 100mg Tablet 1 tab HS     Amitriptyline HCl 25mg Tablet Take 1 tablet(s) by mouth at bedtime     Cardizem CD  120mg Capsules, Extended Release Take 1 capsule(s) by mouth daily     Cymbalta 30mg Capsules, Delayed Release 1 capsule daily     Metformin HCl 500mg Tablets, Extended Release 2 po bid     Loratadine 10mg Tablet Take 1 tablet(s) by mouth daily     Glucose Reagent Blood Test Strips  Reagent Strips Check  blood sugars AC HS. Dispense brand covered by insurance.e11.9     Bydureon 2mg/1pen Injection Suspension, Extended-Release Inject 2 mg subcutaneously q week     ProAir HFA 90mcg/1actuation Oral Inhaler Inhale 2 puff(s) by mouth q 4 to 6 hr prn.     Aspirin (ASA) 81mg Tablets, Enteric Coated 1 tab daily     Furosemide 40mg Tablets 1 by mouth  daily     Potassium Chloride 10mEq Capsules, Extended Release 1 daily prn with lasix use     Atorvastatin Calcium 20mg Tablet 1 po q hs     Gabapentin 300mg Capsules 1 capsule po BID     BD Ultra-Fine Mini Pen Needle 31G x 3/16\"  Pen Needle use dalily with bydureon and lantus as directed     Basaglar KwikPen 100units/1ml Injection inject 35units daily  DX  E11.9     Dulera 200mcg/5mcg Oral Inhaler 2 PUFFS BID     Metoprolol 50mg Tablet 1 tab bid         OBJECTIVE:        Vitals:         Current: 7/29/2019 3:59:08 PM    Ht:  5 ft, 9 in;  Wt: 295.2 lbs;  BMI: 43.6    T: 97.2 F (oral);  BP: 129/50 mm Hg (left arm, " sitting);  P: 68 bpm (left arm (BP Cuff), sitting);  sCr: 0.91 mg/dL;  GFR: 109.61    O2 Sat: 88 % (room air)        Repeat:     5:48:26 PM     BP:   88/67mm Hg (taken during appointment and charted later)     5:47:31 PM     O2 Sat:   68% (room air, taken during appt time)     5:47:59 PM     O2 Sat:   98% (non-rebreather mask, taken during appt and charted later)         Exams:     PHYSICAL EXAM:     GENERAL: well developed, well nourished;  appears ill;     EYES: PERRL, EOMI     E/N/T: EARS: external auditory canal normal;  both TMs are dull;  NOSE: normal turbinates; no sinus tenderness; OROPHARYNX: oral mucosa is normal; posterior pharynx shows no exudate;     RESPIRATORY: tachypneic rales heard throughout;     CARDIOVASCULAR: normal rate; rhythm is regular;     MUSCULOSKELETAL: gait: using wheelchair due to shortness of breath;     NEUROLOGIC: mental status: alert and oriented x 3; GROSSLY INTACT     PSYCHIATRIC: appropriate affect and demeanor;         ASSESSMENT           518.82   J80  Acute respiratory distress, NEC              DDx:     V79.0   Z13.31  Screening for depression              DDx:         ORDERS:         Other Orders:         Depression screen negative  (In-House)           Negative EtOH screen  (In-House)                   PLAN:          Acute respiratory distress, NEC Patient's oxygen saturation down to 68% on room air with increasing drowsiness. Patient placed on nonrebreather mask. Discussed with patient and wife that my recommendation is that he go to ER due to decreased oxygen saturation and abnormal breath sounds. EMS called to transport patient. Oxygen saturations up to 98% on nonrebreather mask. Report called to Kimmy at [facility] ER.         FOLLOW-UP: Chronic visit follow up          Screening for depression     MIPS PHQ-9 Depression Screening: Completed form scanned and in chart; Total Score 6; Positive Depression Screen but after further evaluation the patient does not have  a diagnosis of depression.  Negative alcohol screen           Orders:         Depression screen negative  (In-House)           Negative EtOH screen  (In-House)               CHARGE CAPTURE           **Please note: ICD descriptions below are intended for billing purposes only and may not represent clinical diagnoses**        Primary Diagnosis:         518.82 Acute respiratory distress, NEC            J80    Acute respiratory distress syndrome              Orders:          05109   Office/outpatient visit; established patient, level 4 (37 minutes)  (In-House)           V79.0 Screening for depression            Z13.31    Encounter for screening for depression              Orders:             Depression screen negative  (In-House)                Negative EtOH screen  (In-House)

## 2021-05-18 NOTE — PROGRESS NOTES
Preston Wallis  1965     Office/Outpatient Visit    Visit Date: Mon, Apr 13, 2020 10:26 am    Provider: Kimmy Riley MD (Assistant: Lorin Lambert MA)    Location: [facility]        Electronically signed by Kimmy Riley MD on  04/15/2020 02:07:59 PM                             Subjective:        CC: TELEHEALTH- CONSENT BY Rosangela is a 54 year old White male.  This is a follow-up visit.  pneumonia f/u(DISCUSS POTASSIUM)        HPI:       Gómez was seen last week by Dr Teresa for a telehealth visit for acute complaints of productive cough and worsening shortness of breath with darkgreen sputum and streaks of blood.  He has COPD and was on his O2 at baseline 2 L nasal cannula, nebulizer 4-5 times daily and he has had no fever/chills/CP.  He has had recurrent pneumonia with COPD exacerbations several times this year.   He was admitted to the hospital from 3/6 to 3/8  and d/c home on LevaquinIn June and September 2019 he was treated with IV antibiotics (ceftazidime and cefepime respectively). Dr Teresa treated him with  cefdinir 300 mg twice daily x7 days, doxycycline 1 mg twice daily x7 days and prednisone 50 mg daily x5 days, ordered a sputum sample and CBC and CMP to be collected by home health.  He is doing so much better on the antibiotic.          Additionally, he presents with history of essential (primary) hypertension.  his current cardiac medication regimen includes a diuretic ( Lasix 40 mg daily and hydrochlorothiazide 12.5 mg daily ), a beta-blocker ( Metoprolol 50 mg twice daily ), a calcium channel blocker ( Cardizem 120 mg daily ), and an angiotensin receptor blocker ( Losartan 50 mg daily ).  Compliance with treatment has been fair; he Patient reports good compliance but cannot name his medications or how he takes them..  He is tolerating the medication well without side effects.  He has not kept a blood pressure diary, but states that pressures have been okay.      ROS:      CONSTITUTIONAL:  Positive for fatigue.   Negative for chills or fever.      E/N/T:  Negative for ear pain, tinnitus, nasal congestion, frequent rhinorrhea, sinus pressure and sore throat.      CARDIOVASCULAR:  Negative for chest pain, dizziness, palpitations and edema.      RESPIRATORY:  Positive for recent cough ( typically dry ) and dyspnea.      GASTROINTESTINAL:  Negative for abdominal pain, diarrhea, nausea and vomiting.      MUSCULOSKELETAL:  Negative for arthralgias and myalgias.      INTEGUMENTARY:  Negative for rash.      NEUROLOGICAL:  Negative for headaches, paresthesias and weakness.          Past Medical History / Family History / Social History:         Last Reviewed on 4/09/2020 12:12 PM by Pawan Teresa    Past Medical History:     Use of high risk medications     MRSA pneumonia     Chronic low back pain     Closed fracture of other tarsal and metatarsal bones     Eczema     Low back pain     Type 2 diabetes     Chronic bronchitis, mucopurulent     Diabetes with neurological manifestations, type II or unspecified type, not stated as uncontrolled     Allergies     Bronchiectasis     COPD     Hypercholesterolemia     Type II DM     Hypertension     GERD     Peripheral neuropathy attributed to type II diabetes     Erectile dysfunction due to organic reasons                 PREVENTIVE HEALTH MAINTENANCE             EYE EXAM: was last done 4/24/19         Surgical History:         Cholecystectomy    Tonsillectomy/Adenoidectomy     L knee;    venous port - L chest;         Family History:         Positive for Coronary Artery Disease ( mother ) and Hypertension ( mother ).      Positive for Cancer- type not specified ( sister ).      Positive for Asthma ( father ).      Positive for Type 2 Diabetes ( mother ).          Social History:     Occupation: Disabled (due to COPD)     Marital Status:      Children: 2 children         Tobacco/Alcohol/Supplements:     Last Reviewed on 4/09/2020 12:12 PM by  "Pawan Teresa    Tobacco: He has a past history of cigarette smoking; quit date:  1993.  Non-drinker         Substance Abuse History:     Last Reviewed on 4/09/2020 12:12 PM by Pawan Teresa        Mental Health History:     Last Reviewed on 4/09/2020 12:12 PM by Pawan Teresa        Communicable Diseases (eg STDs):     Last Reviewed on 4/09/2020 12:12 PM by Pawan Teresa        Allergies:     Last Reviewed on 4/09/2020 12:12 PM by Pawan Teresa    Benadryl:      Proventil: candidiasis  (Adverse Reaction)        Current Medications:     Last Reviewed on 4/13/2020 10:35 AM by Lorin Lambert    atorvastatin 20 mg oral tablet [take 1 tablet (20 mg) by oral route once daily]    Blood Glucose Test strips  [Check  blood sugars AC HS. Dispense brand covered by insurance.e11.9]    HYDROcodone-acetaminophen  mg oral tablet [One PO BID PRN]    BD Ultra-Fine Mini Pen Needle 31 gauge x 3/16\"  [use w/ trulicity & basaglar]    omeprazole 40 mg oral capsule,delayed release (enteric coated) [Take 1 capsule(s) by mouth daily]    loratadine 10 mg oral tablet [TAKE 1 TABLET(S) BY MOUTH DAILY]    albuterol sulfate 90 mcg/actuation Inhalation HFA Aerosol Inhaler [INHALE 2 PUFF(S) BY MOUTH 4 TIMES A DAY AS NEEDED]    dilTIAZem HCl 120 mg oral Capsule, Extended Release 24 hr [TAKE 1 CAPSULE BY MOUTH EVERY DAY]    Bydureon 2 mg/0.65 mL subcutaneous Pen Injector [Inject 2 mg subcutaneously q week]    metFORMIN 500 mg oral Tablet, Extended Release 24 hr [2T PO BID]    Breo Ellipta 200mcg/25mcg Inhalation Powder [Take 1 inhalation(s) by mouth daily]    furosemide 40 mg oral tablet [1T PO QD]    Gabapentin 400 mg oral capsule [1 TAB BID]    Eliquis 5 mg oral tablet [1T PO BID]    losartan 100 mg oral tablet [take 1 tablet (100 mg) by oral route once daily]    traZODone 150 mg oral tablet [take 1 tablet (150 mg) by oral route qhs]    Cymbalta 60 mg oral capsule,delayed release (enteric coated) [take 1 capsule (60 mg) by oral route once " daily]    ipratropium-albuterol 0.5 mg-3 mg(2.5 mg base)/3 mL Inhalation Solution for Nebulization [inhale 3 milliliters by nebulization route 4 times per day as needed]    Lotrisone 1-0.05 % Topical Cream [apply to the affected and surrounding areas of skin by topical route 2 times per day in the morning and evening]    BASAGLAR INJ 100UNIT Milliliters  [INJECT 40 UNITS UNDER THE SKIN EACH NIGHT AT BEDTIME]    potassium chloride 10 mEq oral tablet, extended release [1T PO QD]    Zofran 8 mg oral tablet [ODT FORM.  ONE Q SIX HOURS PRN N/V]    metoprolol tartrate 50 mg oral tablet [take 1 tablet (50 mg) by oral route 2 times per day with meals]    Calcitrate 200 mg (950 mg) oral tablet [1T PO QD]    calcium citrate 250 mg calcium oral tablet [1 tab PO daily]    magnesium oxide 400 mg magnesium oral tablet [take one tablet tid]    ferrous sulfate 325 mg (65 mg iron) oral tablet [take 1 tablet (325 mg) by oral route once daily]    lancets  [check blood sugar ac and hs E11.9]    doxycycline monohydrate 100 mg oral capsule [take 1 capsule (100 mg) by oral route 2 times per day]    PREDNISONE 50MG Tablets [TAKE 1 TABLET DAILY]        Objective:        Vitals:         Current: 4/13/2020 11:31:29 AM    Ht:  5 ft, 9 in;  Wt: 279 lbs;  BMI: 41.2T: 97.6 F (oral);  BP: 128/ (left arm, sitting);  sCr: 1.07 mg/dL;  GFR: 91.01        Assessment:         J44.9   Chronic obstructive pulmonary disease, unspecified       I10   Essential (primary) hypertension       J18.9   Pneumonia, unspecified organism           Plan:         Chronic obstructive pulmonary disease, unspecifiedhe is better, wants to be on IV abx but I told him we need to wait and see how he responds to completion of his current abx regimen, cont pulm toilet with nebulizers, and 2 liters O2 NC, cont self isolation due to COVID pandemic    Telehealth: Verbal consent obtained for visit to occur via phone call; Staff, other than provider, present during telephone visit  include Lorin Lambert; Total time spent was 11 minutes; [id number]--Bsrsmtlxp E/M 11-20 minutes         Essential (primary) hypertensionhe was accidentally taking two K pills so he is to cut back to one a day and his labs for cmp via home health are pending-will order him a BP cuff.        Pneumonia, unspecified organismas above            Charge Capture:         Primary Diagnosis:     J44.9  Chronic obstructive pulmonary disease, unspecified           Orders:      [id number]  Phys/QHP telephone evaluation 11-20 minutes  (In-House)              I10  Essential (primary) hypertension     J18.9  Pneumonia, unspecified organism

## 2021-05-18 NOTE — PROGRESS NOTES
"Preston Wallis   1965     Office/Outpatient Visit    Visit Date: Thu, Apr 9, 2020 09:52 am    Provider: Pawan Teresa MD (Assistant: Olesya García MA)    Location: [facility]        Electronically signed by Pawan Teresa MD on  04/09/2020 12:12:59 PM                             Subjective:        CC: Gómez is a 54 year old White male.  coughing up blood, started yesterday wife states no meds have changed .    TELEMEDICINE VISIT:    - Patient consented to this telemedicine visit. Consent obtained by Olesya García MA and Pawan Teresa MD.    - Persons present during the telemedicine consultation include:  Patient, Dr. Teresa    - This visit is being conducted via telephone.            HPI: Patient is being evaluated today for complaints of productive cough and worsening shortness of breath.  Preston reports that for the last several days he has been coughing up progressively darkening green sputum.  There is been at least 2 occasions where he has had blood streaks in the sputum produced.  He has baseline shortness of breath and notes that this is mildly worse.  He has not required any additional supplemental oxygen; he is still on his baseline 2 L nasal cannula. He says he has been using his nebulizer 1-2 times more than he usually does daily. He denies fever chills.  No chest pain.  He has an extensive history of lung disease and respiratory exacerbations. Some of these exacerbations were significant enough to require intubation.  He was seen in our clinic on 2/28 and was diagnosed with pneumonia at that time for which he was started on treatment as an outpatient.  He was admitted to the hospital from 3/6 to 3/8 with chest pain And was ultimately given IV antibiotics.  He was discharged home with Levuin to complete his antibiotic course.  He said he completed this as directed.  He says he feels like he is \"been on antibiotics all year.\"Previous sputum cultures have grown.  In June and September " he was treated with IV antibiotics (ceftazidime and cefepime respectively).  He says that the days following completion of IV antibiotic courses with the only times that he is felt like his symptoms were controlled.  He is wondering if he could be given a round of IV antibiotics at this time.  He says that he has a port in place as well as home health services.    ROS:     CONSTITUTIONAL:  Positive for fatigue.   Negative for chills or fever.      E/N/T:  Negative for ear pain, tinnitus, nasal congestion, frequent rhinorrhea, sinus pressure and sore throat.      CARDIOVASCULAR:  Negative for chest pain, dizziness, palpitations and edema.      RESPIRATORY:  Positive for dyspnea, frequent wheezing, blood streaked sputum and cough.      GASTROINTESTINAL:  Negative for abdominal pain, diarrhea, nausea and vomiting.      MUSCULOSKELETAL:  Negative for arthralgias and myalgias.      INTEGUMENTARY:  Negative for rash.      NEUROLOGICAL:  Negative for headaches, paresthesias and weakness.          Past Medical History / Family History / Social History:         Last Reviewed on 4/09/2020 12:12 PM by Pawan Teresa    Past Medical History:     Use of high risk medications     MRSA pneumonia     Chronic low back pain     Closed fracture of other tarsal and metatarsal bones     Eczema     Low back pain     Type 2 diabetes     Chronic bronchitis, mucopurulent     Diabetes with neurological manifestations, type II or unspecified type, not stated as uncontrolled     Allergies     Bronchiectasis     COPD     Hypercholesterolemia     Type II DM     Hypertension     GERD     Peripheral neuropathy attributed to type II diabetes     Erectile dysfunction due to organic reasons                 PREVENTIVE HEALTH MAINTENANCE             EYE EXAM: was last done 4/24/19         Surgical History:         Cholecystectomy    Tonsillectomy/Adenoidectomy     L knee;    venous port - L chest;         Family History:         Positive for Coronary  Artery Disease ( mother ) and Hypertension ( mother ).      Positive for Cancer- type not specified ( sister ).      Positive for Asthma ( father ).      Positive for Type 2 Diabetes ( mother ).          Social History:     Occupation: Disabled (due to COPD)     Marital Status:      Children: 2 children         Tobacco/Alcohol/Supplements:     Last Reviewed on 4/09/2020 12:12 PM by Pawan Teresa    Tobacco: He has a past history of cigarette smoking; quit date:  1993.  Non-drinker         Substance Abuse History:     Last Reviewed on 4/09/2020 12:12 PM by Pawan Teresa        Mental Health History:     Last Reviewed on 4/09/2020 12:12 PM by Pawan Teresa        Communicable Diseases (eg STDs):     Last Reviewed on 4/09/2020 12:12 PM by Pawan Teresa        Current Problems:     Last Reviewed on 4/09/2020 12:12 PM by Pawan Teresa    Low back pain    Other long term (current) drug therapy    Morbid (severe) obesity due to excess calories    Obstructive sleep apnea (adult) (pediatric)    Chronic obstructive pulmonary disease, unspecified    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 without evidence of necrosis    Major depressive disorder, recurrent, mild    Unspecified fall, initial encounter    Type 1 diabetes mellitus with diabetic chronic kidney disease    Chronic obstructive pulmonary disease with (acute) exacerbation    Rash and other nonspecific skin eruption    Encounter for follow-up examination after completed treatment for conditions other than malignant neoplasm    Other forms of dyspnea    Other pulmonary embolism without acute cor pulmonale    Essential (primary) hypertension    Pneumonia, unspecified organism    Encounter for screening for depression    Anemia, unspecified    Other specified noninfective gastroenteritis and colitis    Paroxysmal atrial fibrillation    Chest pain, unspecified    Tachycardia, unspecified    Hypomagnesemia         "Immunizations:     influenza, injectable, quadrivalent, preservative free 12/5/2019    zzFluzone pf-quadrivalent 3 and up 10/18/2016    zzFluzone pf-quadrivalent 3 and up 10/16/2017    Fluzone pf (3+ years dose) 9/19/2013    Fluzone Quadrivalent (3+ years) 10/8/2018    Tdap (Tetanus, reduced diph, acellular pertussis) 9/18/2018        Allergies:     Last Reviewed on 4/09/2020 12:12 PM by Pawan Teresa:      Proventil: candidiasis  (Adverse Reaction)        Current Medications:     Last Reviewed on 4/09/2020 12:12 PM by Pawan Teresa    atorvastatin 20 mg oral tablet [take 1 tablet (20 mg) by oral route once daily]    insulin lispro 100 unit/mL subcutaneous Insulin Pen [inject by subcutaneous route per prescriber's instructions AC AND HS while on Steriods]    Blood Glucose Test strips  [Check  blood sugars AC HS. Dispense brand covered by insurance.e11.9]    HYDROcodone-acetaminophen  mg oral tablet [One PO BID PRN]    BD Ultra-Fine Mini Pen Needle 31 gauge x 3/16\"  [use w/ trulicity & basaglar]    omeprazole 40 mg oral capsule,delayed release (enteric coated) [Take 1 capsule(s) by mouth daily]    loratadine 10 mg oral tablet [TAKE 1 TABLET(S) BY MOUTH DAILY]    albuterol sulfate 90 mcg/actuation Inhalation HFA Aerosol Inhaler [INHALE 2 PUFF(S) BY MOUTH 4 TIMES A DAY AS NEEDED]    dilTIAZem HCl 120 mg oral Capsule, Extended Release 24 hr [TAKE 1 CAPSULE BY MOUTH EVERY DAY]    Bydureon 2 mg/0.65 mL subcutaneous Pen Injector [Inject 2 mg subcutaneously q week]    metFORMIN 500 mg oral Tablet, Extended Release 24 hr [2T PO BID]    atorvastatin 20 mg oral tablet [TAKE 1  TABLET PO Q HS]    Breo Ellipta 200mcg/25mcg Inhalation Powder [Take 1 inhalation(s) by mouth daily]    furosemide 40 mg oral tablet [1T PO QD]    Gabapentin 400 mg oral capsule [1 TAB BID]    Eliquis 5 mg oral tablet [1T PO BID]    losartan 100 mg oral tablet [take 1 tablet (100 mg) by oral route once daily]    traZODone 150 mg oral " tablet [take 1 tablet (150 mg) by oral route qhs]    Cymbalta 60 mg oral capsule,delayed release (enteric coated) [take 1 capsule (60 mg) by oral route once daily]    ipratropium-albuterol 0.5 mg-3 mg(2.5 mg base)/3 mL Inhalation Solution for Nebulization [inhale 3 milliliters by nebulization route 4 times per day as needed]    Lotrisone 1-0.05 % Topical Cream [apply to the affected and surrounding areas of skin by topical route 2 times per day in the morning and evening]    BASAGLAR INJ 100UNIT Milliliters  [INJECT 40 UNITS UNDER THE SKIN EACH NIGHT AT BEDTIME]    potassium chloride 10 mEq oral tablet, extended release [1T PO QD]    Zofran 8 mg oral tablet [ODT FORM.  ONE Q SIX HOURS PRN N/V]    metoprolol tartrate 50 mg oral tablet [take 1 tablet (50 mg) by oral route 2 times per day with meals]    Calcitrate 200 mg (950 mg) oral tablet [1T PO QD]    calcium citrate 250 mg calcium oral tablet [1 tab PO daily]    magnesium oxide 400 mg magnesium oral tablet [take one tablet tid]    ferrous sulfate 325 mg (65 mg iron) oral tablet [take 1 tablet (325 mg) by oral route once daily]    lancets  [check blood sugar ac and hs E11.9]        Assessment:         J18.9   Pneumonia, unspecified organism       J44.1   Chronic obstructive pulmonary disease with (acute) exacerbation           ORDERS:         Meds Prescribed:       [New Rx] cefdinir 300 mg oral capsule [take 1 capsule (300 mg) by oral route every 12 hours], #14 (fourteen) capsules, Refills: 0 (zero)       [New Rx] doxycycline monohydrate 100 mg oral tablet [take 1 tablet (100 mg) by oral route 2 times per day], #14 (fourteen) tablets, Refills: 0 (zero)       [New Rx] predniSONE 50 mg oral tablet [take 1 tab daily], #5 (five) tablets, Refills: 0 (zero)         Lab Orders:       [id number]  [facility] CBC with 3 part diff  (Send-Out)            [id number]  COMP - [facility] Comp. Metabolic Panel  (Send-Out)            [id number]  SPUT - [facility] Sputum Culture  (Send-Out)                       Plan:         Pneumonia, unspecified organism- Given patient history and clinical picture, will treat for community-acquired/COPD exacerbation.  Will start cefdinir 300 mg twice daily x7 days, doxycycline 1 mg twice daily x7 days and prednisone 50 mg daily x5 days.  I have also ordered a sputum sample, CBC and CMP to be collected by home health.  Will alter therapy if indicated by sputum sample results. I informed the patient thatTo start IV antibiotics should likely be based off culture results or severe symptoms rather than just empirically. Additionally, I will have the patient follow-up with his PCP for another tele-visit on Monday. ED/return precautions given.    LABORATORY:  Labs ordered to be performed today include CBC, Comprehensive metabolic panel, and sputum culture.  Telehealth: Verbal consent obtained for visit to occur via phone call; Total time spent was 22 minutes; [id number]--Vaulhgmfm E/M 21-30 minutes           Prescriptions:       [New Rx] cefdinir 300 mg oral capsule [take 1 capsule (300 mg) by oral route every 12 hours], #14 (fourteen) capsules, Refills: 0 (zero)       [New Rx] doxycycline monohydrate 100 mg oral tablet [take 1 tablet (100 mg) by oral route 2 times per day], #14 (fourteen) tablets, Refills: 0 (zero)       [New Rx] predniSONE 50 mg oral tablet [take 1 tab daily], #5 (five) tablets, Refills: 0 (zero)           Orders:       [id number]  [facility] CBC with 3 part diff  (Send-Out)            [id number]  COMP - [facility] Comp. Metabolic Panel  (Send-Out)            [id number]  SPUT - [facility] Sputum Culture  (Send-Out)              Chronic obstructive pulmonary disease with (acute) exacerbation- See above            Charge Capture:         Primary Diagnosis:     J18.9  Pneumonia, unspecified organism           Orders:      [id number]  Phys/QHP telephone evaluation 21-30 minutes  (In-House)              J44.1  Chronic obstructive pulmonary disease with (acute) exacerbation         ADDENDUMS:       ____________________________________    Addendum: 04/09/2020 11:25 AM - One, Team         Visit Note Faxed to:        SHANNAN [facility]; Number [phone]

## 2021-05-18 NOTE — PROGRESS NOTES
Preston Wallis  1965     Office/Outpatient Visit    Visit Date: Tue, Dec 1, 2020 10:45 am    Provider: Kimmy Riley MD (Assistant: Yessi Stockton MA)    Location: [facility]        Electronically signed by Kimmy Riley MD on  12/08/2020 01:37:13 PM                             Subjective:        CC: Gómez is a 55 year old White male.  He is here today following a transition of care from an inpatient hospital: [facility]. The patient was admitted on 11-24-20 and discharged on 11-26-20. The patient was admitted for hyperglycemia. Our office called the patient within 48 hours of discharge and scheduled the follow-up appointment. During the patient's hospital stay the patient was treated by Dr. Cottrell.          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  Specifically, this is type 1 diabetes, complicated by nephropathy and peripheral neuropathy.  Compliance with treatment has been poor; he skips some insulin doses due to forgetfulness and inconvenience of dosing and does not follow a diet and exercise regimen.      Tobacco screen: Non-smoker.  Current meds include insulin/injectable ( amdelog, basaglar ).  He does not perform home blood glucose monitoring.  Has not fallen recently In regard to preventative care, his last ophthalmology exam was in 4/2019.            Concerning essential (primary) hypertension, his current cardiac medication regimen includes a diuretic ( Lasix 40 mg daily ), a beta-blocker ( Metoprolol 50 mg twice daily ), a calcium channel blocker ( Cardizem 120 mg daily ), and hydralazine.  Compliance with treatment has been fair; he Patient reports good compliance but cannot name his medications or how he takes them..  He is tolerating the medication well without side effects.  He has not kept a blood pressure diary, but states that pressures have been okay.        Patient to be evaluated for PRATIBHA for recent  hospitalization for  pseudomonas pneumonia, and he is doing better.  He was admitted on 11/24 and d/c 11/26 and was admitted with BS 1200, hgba1c 16%, and he was hypoxic and SOA with thick green sputum.  He has recently started on tobramycin for chronic pseudomonas bronchiectasis by Dr Chavez.   He is also on symbicort daily, albuterol nebulizers.  In the hospital he had a couple of episodes of difficulty talking and his BP were high with the high BS so he was worked up for a stroke and the CT head showed no acute intracranial process.    CXR showed bronchial thickening and bronchiectasis.  Gómez was admitted to ICU for DKA.  He admits to me today he has not been taking his insulin for months because he forgets about it.  I lectured him hard today about how this is a life threatening illness and if he does not become compliant with his insulin he could die, dasia of MI or stroke or kidney failure.  In addition, I reiterated the need for him to bring his meds in with him every time so I can confirm what he is on.  He does not have a f/u appt with Dr Chavez for another 3 months and is due for a tobramycin refill in 3 days.  He feels good today, no fever, eating ok-just eats 2 meals a day.  BM are normal.  He is sleeping ok.  Still feels mild depression.  No fevers or SOA at rest.  His BS today was 131.  d/c labs show WBC 10, hgb 12.8. hct 42.3, Cr 1.2.  He still has a green productive cough        He has h/o PE and is on eliquis    H/o afib and is in NSR    hypokalemia-he was sent home on K supplementation    ROS:     CONSTITUTIONAL:  Negative for chills and fever.      EYES:  Negative for blurred vision and eye pain.      E/N/T:  Negative for ear pain, frequent rhinorrhea and sore throat.      CARDIOVASCULAR:  Negative for chest pain, orthopnea, paroxysmal nocturnal dyspnea and pedal edema.      RESPIRATORY:  Positive for recent cough ( productive and green ), persistent cough ( typically dry ) and dyspnea.    Negative for hemoptysis or frequent wheezing.      GASTROINTESTINAL:  Negative for abdominal pain, constipation, diarrhea, nausea and vomiting.      INTEGUMENTARY:  Negative for rash.      NEUROLOGICAL:  Positive for weakness.   Negative for dizziness or headaches.      PSYCHIATRIC:  Positive for anxiety and feelings of stress.   Negative for sleep disturbance or suicidal thoughts.          Past Medical History / Family History / Social History:         Last Reviewed on 12/01/2020 12:02 PM by Kimmy Riley    Past Medical History:     Use of high risk medications     MRSA pneumonia     Chronic low back pain     Closed fracture of other tarsal and metatarsal bones     Eczema     Low back pain     Type 2 diabetes     Chronic bronchitis, mucopurulent     Diabetes with neurological manifestations, type II or unspecified type, not stated as uncontrolled     Allergies     Bronchiectasis     COPD     Hypercholesterolemia     Type II DM     Hypertension     GERD     Peripheral neuropathy attributed to type II diabetes     Erectile dysfunction due to organic reasons                 PREVENTIVE HEALTH MAINTENANCE             EYE EXAM: was last done 4/24/19         Surgical History:         Cholecystectomy    Tonsillectomy/Adenoidectomy     L knee;    venous port - L chest;         Family History:         Positive for Coronary Artery Disease ( mother ) and Hypertension ( mother ).      Positive for Cancer- type not specified ( sister ).      Positive for Asthma ( father ).      Positive for Type 2 Diabetes ( mother ).          Social History:     Occupation: Disabled (due to COPD)     Marital Status:      Children: 2 children         Tobacco/Alcohol/Supplements:     Last Reviewed on 12/01/2020 10:53 AM by Yessi Stockton    Tobacco: He has a past history of cigarette smoking; quit date:  1993.  Non-drinker         Substance Abuse History:     Last Reviewed on 4/09/2020 12:12 PM by Pawan Teresa        [facility]  "History:     Last Reviewed on 4/09/2020 12:12 PM by Pawan Teresa        Communicable Diseases (eg STDs):     Last Reviewed on 4/09/2020 12:12 PM by Pawan Teresa        Allergies:     Last Reviewed on 10/13/2020 03:59 PM by Sanjuanita Crocker:      Proventil: candidiasis  (Adverse Reaction)        Current Medications:     Last Reviewed on 9/21/2020 12:35 PM by Andrea Beckford    aspirin 81 mg oral tablet, delayed release (enteric coated) [take 1 tablet (81 mg) by oral route once daily]    Symbicort 160-4.5 mcg/actuation Inhalation HFA Aerosol Inhaler [inhale 2 puffs by inhalation route 2 times per day in the morning and evening]    insulin glargine 100 unit/mL subcutaneous Solution [inject by subcutaneous 50 units every morning]    insulin lispro 100 unit/mL subcutaneous Insulin Pen [inject by subcutaneous route 0-6 units as needed for high blood sugar]    Accu-Chek SmartView Test Strips  [CHECK BLOOD SUGAR BEFORE MEALS AND AT BEDTIME]    BD Ultra-Fine Mini Pen Needle 31 gauge x 3/16\"  [use w/ trulicity & basaglar]    omeprazole 40 mg oral capsule,delayed release (enteric coated) [TAKE 1 CAPSULE(S) BY MOUTH DAILY]    albuterol sulfate 90 mcg/actuation Inhalation HFA Aerosol Inhaler [INHALE 2 PUFF(S) BY MOUTH 4 TIMES A DAY AS NEEDED]    Bydureon 2 mg/0.65 mL subcutaneous Pen Injector [Inject 2 mg subcutaneously q week]    metFORMIN 500 mg oral Tablet, Extended Release 24 hr [TAKE 2 TABLETS BY MOUTH TWICE DAILY]    Eliquis 5 mg oral tablet [TAKE 1 TABLET BY MOUTH TWICE DAILY]    DULoxetine 60 mg oral capsule,delayed release (enteric coated) [1 po bid]    Calcitrate 200 mg (950 mg) oral tablet [1T PO QD]    magnesium oxide 400 mg (241.3 mg magnesium) oral tablet [TAKE ONE TABLET THREE TIMES DAILY]    ferrous sulfate 325 mg (65 mg iron) oral tablet [take 1 tablet (325 mg) by oral route once daily]    lancets  [check blood sugar ac and hs E11.9]    blood pressure monitor kit  [patient to monitor b/p twice daily " DX I 10]    atorvastatin 20 mg oral tablet [TAKE 1 TABLET BY MOUTH EVERY AT BEDTIME]    traZODone 50 mg oral tablet [take 1 tablet (50 mg) by oral route qhs]    famotidine 40 mg oral tablet [take 1 tablet (40 mg) by oral route daily]    metoprolol tartrate 100 mg oral tablet [take 2 tablets by oral route in the am and one tablet in the PM]    dilTIAZem HCl 120 mg oral Capsule, Extended Release 24 hr [take 1 capsule (120 mg) by oral route once daily]    hydrALAZINE 25 mg oral tablet [take 1 tablet (25 mg) by oral route 2 times per day with food]    tobramycin 28 mg Inhalation Capsule, With Inhalation Device [inhale the contents of 4 capsules (112 mg) by inhalation route every 12 hours for 28 days]        Objective:        Vitals:         Current: 12/1/2020 10:54:54 AM    Ht:  5 ft, 9 in;  Wt: 257.2 lbs;  BMI: 38.0T: 96.5 F (temporal);  BP: 124/67 mm Hg (left arm, sitting);  P: 65 bpm (left arm (BP Cuff), sitting);  sCr: 1.13 mg/dL;  GFR: 82.31O2 Sat: 100 % (2 liters O2)        Exams:     PHYSICAL EXAM:     GENERAL: Vitals recorded well developed, well nourished;  well groomed;  no apparent distress;     EYES: PERRL, EOMI     E/N/T: EARS:  normal external auditory canals and tympanic membranes;  grossly normal hearing; OROPHARYNX:  normal mucosa, dentition, gingiva, and posterior pharynx;     NECK: range of motion is normal; thyroid is non-palpable; carotid exam is normal with good upstroke and no bruits; supple;     RESPIRATORY: normal respiratory rate and pattern with no distress; coarse breath sounds throughout; no wheezes;     CARDIOVASCULAR: regular rate and rhythm; normal S1, S2; no murmur, rub, or gallop; normal PMI;     GASTROINTESTINAL: nontender; normal bowel sounds;     MUSCULOSKELETAL: gait: in wheel chair-refuses to walk but he can with a walker;     NEUROLOGIC: mental status: oriented to person, place, and time;  GROSSLY INTACT     PSYCHIATRIC: affect/demeanor: flat;  normal psychomotor function; speech  pattern: flat;  normal thought and perception;         Assessment:         E10.22   Type 1 diabetes mellitus with diabetic chronic kidney disease       I10   Essential (primary) hypertension       J15.1   Pneumonia due to Pseudomonas       F33.0   Major depressive disorder, recurrent, mild       J44.1   Chronic obstructive pulmonary disease with (acute) exacerbation       I50.32   Chronic diastolic (congestive) heart failure           ORDERS:         Radiology/Test Orders:       [id number]  Radiologic exam chest 2 views  (Send-Out)              Procedures Ordered:       REFER  Referral to Specialist or Other Facility  (Send-Out)                      Plan:         Type 1 diabetes mellitus with diabetic chronic kidney diseaseBS 131 this am  I lectured him hard today about how this is a life threatening illness and if he does not become compliant with his insulin he could die, dasia of MI or stroke or kidney failure.  In addition, I reiterated the need for him to bring his meds in with him every time so I can confirm what he is on.         REFERRALS:  Referral initiated to Diabetes Management Program at [facility] ANDUJAR.            Orders:       REFER  Referral to Specialist or Other Facility  (Send-Out)              Essential (primary) hypertensionstable        Pneumonia due to Pseudomonashe is on albuterol, duloxetine, lasix 40, eliquis, trazodone, asa, cartia, calcium, hydralazine, atorvastatin, pepcid, bydureoon, admelog, and basaglar He does not have a f/u appt with Dr Chavez for another 3 months and is due for a tobramycin refill in 3 days-we will contract his office to refill this med and set up f/u appt, sent for cxr to re eval pneumonia.  He feels good today, no fever, eating ok-just eats 2 meals a day.  BM are normal.  He is sleeping ok.  Still feels mild depression.  No fevers or SOA at rest.  His BS today was 131.          RADIOLOGY:  I have ordered a chest x-ray (PA and lateral) to be done today.             Orders:       [id number]  Radiologic exam chest 2 views  (Send-Out)              Major depressive disorder, recurrent, mildcontinue duloxetine 60 mg daily        Chronic obstructive pulmonary disease with (acute) exacerbationas above        Chronic diastolic (congestive) heart failurechronic but stable            Charge Capture:         Primary Diagnosis:     E10.22  Type 1 diabetes mellitus with diabetic chronic kidney disease           Orders:      [id number]  Transitional care manage service 7 day discharge  (In-House)            01853  Office/outpatient visit; established patient, level 4  (In-House)              I10  Essential (primary) hypertension     J15.1  Pneumonia due to Pseudomonas     F33.0  Major depressive disorder, recurrent, mild     J44.1  Chronic obstructive pulmonary disease with (acute) exacerbation     I50.32  Chronic diastolic (congestive) heart failure         ADDENDUMS:      ____________________________________    Addendum: 12/14/2020 02:35 PM - Kimmy Riley        remove billing charge [id number].[facility]

## 2021-05-18 NOTE — PROGRESS NOTES
Preston Wallis  1965     Office/Outpatient Visit    Visit Date: Wed, Mar 11, 2020 02:25 pm    Provider: Kimmy Riley MD (Assistant: Cindy Lopez MA)    Location: [facility]        Electronically signed by Kimmy Riley MD on  03/12/2020 01:40:19 PM                             Subjective:        CC: Gómez is a 54 year old White male.  (could not stand for weight today, doesnt know what dosage of Metoprolol he is on)         HPI:       Gómez went to [facility] ER for acute chest pain on 3/6/20-he experienced chest pressure mid chest, non radiating,  improved with morphine and baby aspirin.  He was also SOA and he was admitted for EKG/troponins and telemetry to r/o MI-all was neg and he is going to f/u with cardiology Dr Foster and has a stress test-chemical stress test set up on Monday.  He was also treated with IV steroids and bronchodilators of acute on chronic COPD exacerbations.  He is already on eliquis for paroxysmal afib/PE/DVT.    He also recently was treated for pneumonia.         ECHO showed         Diarrhea inpt was treated with imodium            Discharge meds include K, mucinex, vicodin, claritan, ventolin hfa, duonebs, bydureon, lipitor, metformin, lopressor, eliquis, cymbalta, diltiazem omeprazole, trazodone, losartan, hctz, laxis neurontin, lantus 40 units and norvasc.      He was also sent home on K and this is new.  and steroids which he has not taken due to high BS's.         Last hgba1c was >11%-he is poorly compliant with his insulin-but doing better and now taking SSI    ROS:     CONSTITUTIONAL:  Positive for fatigue.   Negative for chills or fever.      CARDIOVASCULAR:  Negative for chest pain, orthopnea, paroxysmal nocturnal dyspnea and pedal edema.      RESPIRATORY:  Positive for recent cough, dyspnea ( with moderate exertion ) and frequent wheezing.   Negative for hemoptysis or cough.      GASTROINTESTINAL:  Negative for abdominal pain, heartburn, constipation,  diarrhea, and stool changes.      INTEGUMENTARY:  Positive for rash (chronic recurring).      NEUROLOGICAL:  Positive for weakness ( generalized ).          Past Medical History / Family History / Social History:         Last Reviewed on 3/11/2020 02:44 PM by Kimmy Riley    Past Medical History:     Use of high risk medications     MRSA pneumonia     Chronic low back pain     Closed fracture of other tarsal and metatarsal bones     Eczema     Low back pain     Type 2 diabetes     Chronic bronchitis, mucopurulent     Diabetes with neurological manifestations, type II or unspecified type, not stated as uncontrolled     Allergies     Bronchiectasis     COPD     Hypercholesterolemia     Type II DM     Hypertension     GERD     Peripheral neuropathy attributed to type II diabetes     Erectile dysfunction due to organic reasons                 PREVENTIVE HEALTH MAINTENANCE             EYE EXAM: was last done 4/24/19         Surgical History:         Cholecystectomy    Tonsillectomy/Adenoidectomy     L knee;    venous port - L chest;         Family History:         Positive for Coronary Artery Disease ( mother ) and Hypertension ( mother ).      Positive for Cancer- type not specified ( sister ).      Positive for Asthma ( father ).      Positive for Type 2 Diabetes ( mother ).          Social History:     Occupation: Disabled (due to COPD)     Marital Status:      Children: 2 children         Tobacco/Alcohol/Supplements:     Last Reviewed on 3/11/2020 02:27 PM by Cindy Lopez    Tobacco: He has a past history of cigarette smoking; quit date:  1993.  Non-drinker         Substance Abuse History:     Last Reviewed on 9/18/2019 08:33 PM by Pawan Teresa        Mental Health History:     Last Reviewed on 9/18/2019 08:33 PM by Pawan Teresa        Communicable Diseases (eg STDs):     Last Reviewed on 9/18/2019 08:33 PM by Pawan Teresa        Allergies:     Last Reviewed on 3/11/2020 02:27 PM by Cindy Lopez  "JADEN Watson:      Proventil: candidiasis  (Adverse Reaction)        Current Medications:     Last Reviewed on 3/11/2020 02:27 PM by Cindy Lopez    metoprolol tartrate 50 mg oral tablet [take 1 tablet (50 mg) by oral route 2 times per day with meals]    atorvastatin 20 mg oral tablet [take 1 tablet (20 mg) by oral route once daily]    insulin lispro 100 unit/mL subcutaneous Insulin Pen [inject by subcutaneous route per prescriber's instructions AC AND HS while on Steriods]    Glucose Reagent Blood Test Strips  Reagent Strips [Check  blood sugars AC HS. Dispense brand covered by insurance.e11.9]    HYDROcodone-acetaminophen  mg oral tablet [One PO BID PRN]    BD Ultra-Fine Mini Pen Needle 31 gauge x 3/16\"  [use w/ trulicity & basaglar]    omeprazole 40 mg oral capsule,delayed release (enteric coated) [Take 1 capsule(s) by mouth daily]    loratadine 10 mg oral tablet [TAKE 1 TABLET(S) BY MOUTH DAILY]    albuterol sulfate 90 mcg/actuation Inhalation HFA Aerosol Inhaler [INHALE 2 PUFF(S) BY MOUTH 4 TIMES A DAY AS NEEDED]    dilTIAZem HCl 120 mg oral Capsule, Extended Release 24 hr [TAKE 1 CAPSULE BY MOUTH EVERY DAY]    metFORMIN 500 mg oral Tablet, Extended Release 24 hr [2T PO BID]    atorvastatin 20 mg oral tablet [TAKE 1  TABLET PO Q HS]    Breo Ellipta 200mcg/25mcg Inhalation Powder [Take 1 inhalation(s) by mouth daily]    furosemide 40 mg oral tablet [1T PO QD]    metoprolol tartrate 50 mg oral tablet [1T PO  BID]    hydroCHLOROthiazide 12.5 mg oral capsule [TAKE 1 CAPSULE BY MOUTH TWICE DAILY]    Eliquis 5mg Tablet [take 1 tab BID]    losartan 100 mg oral tablet [take 1 tablet (100 mg) by oral route once daily]    traZODone 150 mg oral tablet [take 1 tablet (150 mg) by oral route qhs]    Cymbalta 60 mg oral capsule,delayed release (enteric coated) [take 1 capsule (60 mg) by oral route once daily]    ipratropium-albuterol 0.5 mg-3 mg(2.5 mg base)/3 mL Inhalation Solution for Nebulization [inhale 3 " "milliliters by nebulization route 4 times per day as needed]    Lotrisone 1-0.05 % Topical Cream [apply to the affected and surrounding areas of skin by topical route 2 times per day in the morning and evening]    BASAGLAR INJ 100UNIT Milliliters  [INJECT 40 UNITS UNDER THE SKIN EACH NIGHT AT BEDTIME]    potassium chloride 10 mEq oral capsule, extended release [take 1 capsule (10 meq) daily ]    Zofran 8 mg oral tablet [ODT FORM.  ONE Q SIX HOURS PRN N/V]        Objective:        Vitals:         Current: 3/11/2020 2:28:15 PM    Ht:  5 ft, 9 inT: 98.3 F (oral);  BP: 98/55 mm Hg (left arm, sitting);  P: 100 bpm (left arm (BP Cuff), sitting);  sCr: 1.53 mg/dL;  GFR: 46.27        Exams:     PHYSICAL EXAM:     GENERAL: Vitals recorded well developed, well nourished;  no apparent distress, tired-appearing;     EYES: PERRL, EOMI     E/N/T: EARS:  normal external auditory canals and tympanic membranes;  grossly normal hearing; OROPHARYNX:  normal mucosa, dentition, gingiva, and posterior pharynx;     NECK:  supple, full ROM; no thyromegaly; no carotid bruits;     RESPIRATORY: normal respiratory rate and pattern with no distress; no rales (\"crackles\") present; no rhonchi; diffuse inspiratory wheezes; expiratory wheezes in the apices;     CARDIOVASCULAR: normal rate; rhythm is regular;  normal S1; normal S2; no systolic murmur; no cyanosis; no edema;     GASTROINTESTINAL: nontender, nondistended; no hepatosplenomegaly or masses; no bruits;     LYMPHATIC: no enlargement of cervical or facial nodes;     MUSCULOSKELETAL:  Normal range of motion, strength and tone;     NEUROLOGICAL:  cranial nerves, motor and sensory function, reflexes, gait and coordination are all intact;     PSYCHIATRIC:  appropriate affect and demeanor; normal speech pattern; grossly normal memory;         Assessment:         R07.9   Chest pain, unspecified       J44.1   Chronic obstructive pulmonary disease with (acute) exacerbation       M54.5   Low back " pain       E66.01   Morbid (severe) obesity due to excess calories       E10.22   Type 1 diabetes mellitus with diabetic chronic kidney disease       D64.9   Anemia, unspecified       I48.0   Paroxysmal atrial fibrillation       I10   Essential (primary) hypertension           ORDERS:         Meds Prescribed:       [Refilled] hydroCHLOROthiazide 12.5 mg oral capsule [TAKE 1 CAPSULE BY MOUTH TWICE DAILY], #180 (one hundred and eighty) capsules, Refills: 1 (one)       [Refilled] dilTIAZem HCl 120 mg oral Capsule, Extended Release 24 hr [TAKE 1 CAPSULE BY MOUTH EVERY DAY], #90 (ninety) capsules, Refills: 1 (one)       [Refilled] metoprolol tartrate 50 mg oral tablet [take 1 tablet (50 mg) by oral route 2 times per day with meals], #180 (one hundred and eighty) tablets, Refills: 1 (one)       [Refilled] loratadine 10 mg oral tablet [TAKE 1 TABLET(S) BY MOUTH DAILY], #90 (ninety) tablets, Refills: 0 (zero)       [Refilled] Bydureon 2 mg/0.65 mL subcutaneous Pen Injector [Inject 2 mg subcutaneously q week], #4 (four) each, Refills: 5 (five)                 Plan:         Chest pain, unspecifiedset up to f/u cardiology Dr Foster, has stress test scheduled for Monday, no CP today, SOA is baseline        Chronic obstructive pulmonary disease with (acute) exacerbationhe is doing well, cont bronchodilators        Low back painstable        Morbid (severe) obesity due to excess caloriesneeds wt loss        Type 1 diabetes mellitus with diabetic chronic kidney diseasepoorly controlled, but much better on SSI with Basaglar          Prescriptions:       [Refilled] hydroCHLOROthiazide 12.5 mg oral capsule [TAKE 1 CAPSULE BY MOUTH TWICE DAILY], #180 (one hundred and eighty) capsules, Refills: 1 (one)       [Refilled] dilTIAZem HCl 120 mg oral Capsule, Extended Release 24 hr [TAKE 1 CAPSULE BY MOUTH EVERY DAY], #90 (ninety) capsules, Refills: 1 (one)       [Refilled] metoprolol tartrate 50 mg oral tablet [take 1 tablet (50 mg) by oral  route 2 times per day with meals], #180 (one hundred and eighty) tablets, Refills: 1 (one)       [Refilled] loratadine 10 mg oral tablet [TAKE 1 TABLET(S) BY MOUTH DAILY], #90 (ninety) tablets, Refills: 0 (zero)       [Refilled] Bydureon 2 mg/0.65 mL subcutaneous Pen Injector [Inject 2 mg subcutaneously q week], #4 (four) each, Refills: 5 (five)         Anemia, unspecifiedhct 32 and stable in hospital        Paroxysmal atrial fibrillationstable on CCB, BB and eliquis        Essential (primary) hypertensionVNA home health to asses for PT/OT, Bps and COPD, BP are low, he is taking 150 mg losartan-this is too much, the 50 mg tablet was removed from his meds            Charge Capture:         Primary Diagnosis:     R07.9  Chest pain, unspecified           Orders:      [id number]  Office/outpatient visit; established patient, level 4  (In-House)              J44.1  Chronic obstructive pulmonary disease with (acute) exacerbation     M54.5  Low back pain     E66.01  Morbid (severe) obesity due to excess calories     E10.22  Type 1 diabetes mellitus with diabetic chronic kidney disease     D64.9  Anemia, unspecified     I48.0  Paroxysmal atrial fibrillation     I10  Essential (primary) hypertension

## 2021-05-18 NOTE — PROGRESS NOTES
Preston Wallis  1965     Office/Outpatient Visit    Visit Date: Wed, Jul 22, 2020 01:03 pm    Provider: Kimmy Riley MD (Assistant: Yessi Stockton MA)    Location: [facility]        Electronically signed by Kimmy Riley MD on  07/28/2020 12:57:11 PM                             Subjective:        CC: poorly controlled diabetes    HPI:       His diabetes has been very poorly controlled, if I cannot get him back under control I will have to refer him to derm.  At this time increase his basaglar to 40 units a day, and SSI as 2 units for -199, 4 units 200-249, 6 units 250-299, 8 units 300-349, and 10 units > 350 as well as 1 unit for every 15 grams of carbohydrate he eats at his meal. Today he states his bs over the past month have improved, running in 200s first couple weeks and last week they have been in the 100's and he is pleased    Patient presents with type 1 diabetes mellitus with diabetic chronic kidney disease.  Specifically, this is type 1 diabetes, complicated by nephropathy and peripheral neuropathy.  Compliance with treatment has been poor; he skips some insulin doses due to forgetfulness and inconvenience of dosing and does not follow a diet and exercise regimen.      Tobacco screen: Non-smoker.  Most recent lab results include Total Cholesterol:  129 (mg/dL) (11/11/2019), HDL:  37 (mg/dL) (11/11/2019), Triglycerides:  172 (mg/dL) (11/11/2019), LDL:  58 (mg/dL) (11/11/2019), Hemoglobin A1c:  13.1 (%) (06/17/2020),  9.4 (07/22/2020).  Has not fallen recently In regard to preventative care, his last ophthalmology exam was in 4/2019.        chronic COPD, he is overall stable on meds and doing well    ROS:     CONSTITUTIONAL:  Negative for chills and fever.      EYES:  Negative for blurred vision and eye pain.      E/N/T:  Positive for nasal congestion.   Negative for ear pain, frequent rhinorrhea or sore throat.      CARDIOVASCULAR:  Negative for chest pain,  orthopnea, paroxysmal nocturnal dyspnea and pedal edema.      RESPIRATORY:  Positive for persistent cough ( typically dry ).   Negative for dyspnea, hemoptysis or frequent wheezing.      GASTROINTESTINAL:  Negative for abdominal pain, heartburn, constipation, diarrhea, and stool changes.      INTEGUMENTARY:  Negative for rash.      NEUROLOGICAL:  Negative for dizziness and headaches.      PSYCHIATRIC:  Negative for anxiety, depression, and sleep disturbances.          Past Medical History / Family History / Social History:         Last Reviewed on 7/22/2020 02:07 PM by Kimmy Riley    Past Medical History:     Use of high risk medications     MRSA pneumonia     Chronic low back pain     Closed fracture of other tarsal and metatarsal bones     Eczema     Low back pain     Type 2 diabetes     Chronic bronchitis, mucopurulent     Diabetes with neurological manifestations, type II or unspecified type, not stated as uncontrolled     Allergies     Bronchiectasis     COPD     Hypercholesterolemia     Type II DM     Hypertension     GERD     Peripheral neuropathy attributed to type II diabetes     Erectile dysfunction due to organic reasons                 PREVENTIVE HEALTH MAINTENANCE             EYE EXAM: was last done 4/24/19         Surgical History:         Cholecystectomy    Tonsillectomy/Adenoidectomy     L knee;    venous port - L chest;         Family History:         Positive for Coronary Artery Disease ( mother ) and Hypertension ( mother ).      Positive for Cancer- type not specified ( sister ).      Positive for Asthma ( father ).      Positive for Type 2 Diabetes ( mother ).          Social History:     Occupation: Disabled (due to COPD)     Marital Status:      Children: 2 children         Tobacco/Alcohol/Supplements:     Last Reviewed on 7/22/2020 01:08 PM by Yessi Stockton    Tobacco: He has a past history of cigarette smoking; quit date:  1993.  Non-drinker         Substance Abuse  "History:     Last Reviewed on 4/09/2020 12:12 PM by Pawan Teresa        Mental Health History:     Last Reviewed on 4/09/2020 12:12 PM by Pawan Teresa        Communicable Diseases (eg STDs):     Last Reviewed on 4/09/2020 12:12 PM by Pawan Teresa        Allergies:     Last Reviewed on 6/17/2020 12:14 PM by Cindy Lopezl:      Proventil: candidiasis  (Adverse Reaction)        Current Medications:     Last Reviewed on 6/17/2020 12:48 PM by Kimmy Riley    aspirin 81 mg oral tablet, delayed release (enteric coated) [take 1 tablet (81 mg) by oral route once daily]    Accu-Chek SmartView Test Strips  [CHECK BLOOD SUGAR BEFORE MEALS AND AT BEDTIME]    HYDROcodone-acetaminophen  mg oral tablet [One PO BID PRN]    BD Ultra-Fine Mini Pen Needle 31 gauge x 3/16\"  [use w/ trulicity & basaglar]    omeprazole 40 mg oral capsule,delayed release (enteric coated) [TAKE 1 CAPSULE(S) BY MOUTH DAILY]    loratadine 10 mg oral tablet [TAKE 1 TABLET(S) BY MOUTH DAILY]    albuterol sulfate 90 mcg/actuation Inhalation HFA Aerosol Inhaler [INHALE 2 PUFF(S) BY MOUTH 4 TIMES A DAY AS NEEDED]    dilTIAZem HCl 120 mg oral Capsule, Extended Release 24 hr [TAKE 1 CAPSULE BY MOUTH EVERY DAY]    Bydureon 2 mg/0.65 mL subcutaneous Pen Injector [Inject 2 mg subcutaneously q week]    metFORMIN 500 mg oral Tablet, Extended Release 24 hr [2T PO BID]    Breo Ellipta 200mcg/25mcg Inhalation Powder [Take 1 inhalation(s) by mouth daily]    furosemide 40 mg oral tablet [TAKE 1 TABLET BY MOUTH EVERY DAY]    gabapentin 400 mg oral capsule [TAKE 1 CAPSULE BY MOUTH TWICE DAILY]    Eliquis 5 mg oral tablet [1T PO BID]    losartan 100 mg oral tablet [take 1 tablet (100 mg) by oral route once daily]    traZODone 150 mg oral tablet [take 1 tablet (150 mg) by oral route qhs]    DULoxetine 60 mg oral capsule,delayed release (enteric coated) [1C PO QD]    ipratropium-albuterol 0.5 mg-3 mg(2.5 mg base)/3 mL Inhalation Solution for " Nebulization [inhale 3 milliliters by nebulization route 4 times per day as needed]    Lotrisone 1-0.05 % Topical Cream [apply to the affected and surrounding areas of skin by topical route 2 times per day in the morning and evening]    BASAGLAR INJ 100UNIT Milliliters  [INJECT 40 UNITS UNDER THE SKIN EACH NIGHT AT BEDTIME]    potassium chloride 10 mEq oral tablet, extended release [TAKE 1 TABLET BY MOUTH EVERY DAY]    Zofran 8 mg oral tablet [ODT FORM.  ONE Q SIX HOURS PRN N/V]    Calcitrate 200 mg (950 mg) oral tablet [1T PO QD]    calcium citrate 250 mg calcium oral tablet [1 tab PO daily]    magnesium oxide 400 mg (241.3 mg magnesium) oral tablet [TAKE ONE TABLET THREE TIMES DAILY]    ferrous sulfate 325 mg (65 mg iron) oral tablet [take 1 tablet (325 mg) by oral route once daily]    lancets  [check blood sugar ac and hs E11.9]    blood pressure monitor kit  [patient to monitor b/p twice daily DX I 10]    atorvastatin 20 mg oral tablet [TAKE 1  TABLET PO Q HS]    metoprolol tartrate 100 mg oral tablet [take 1 tablet (100 mg) by oral route 2 times per day]        Objective:        Vitals:         Current: 7/22/2020 1:10:22 PM    Ht:  5 ft, 9 in;  Wt: 272.4 lbs;  BMI: 40.2T: 97.1 F (oral);  BP: 136/61 mm Hg (left arm, sitting);  P: 60 bpm (left arm (BP Cuff), sitting);  sCr: 1.07 mg/dL;  GFR: 89.07O2 Sat: 93 % (2 liters O2)        Exams:     PHYSICAL EXAM:     GENERAL: Vitals recorded well developed, well nourished;  well groomed;  no apparent distress;     EYES: PERRL, EOMI     NECK: supple;     RESPIRATORY: normal respiratory rate and pattern with no distress;     CARDIOVASCULAR: regular rate and rhythm; normal S1, S2; no murmur, rub, or gallop; normal PMI;     MUSCULOSKELETAL: gait: in wheel chair;     NEUROLOGIC: mental status: oriented to person, place, and time;  GROSSLY INTACT     PSYCHIATRIC:  appropriate affect and demeanor; normal speech pattern; grossly normal memory;         Lab/Test Results:          Hemoglobin A1c: 9.4 (07/22/2020),     Performed by:: ANA MARIA RN (07/22/2020),             Assessment:         E10.22   Type 1 diabetes mellitus with diabetic chronic kidney disease       M54.5   Low back pain       E66.01   Morbid (severe) obesity due to excess calories       J44.9   Chronic obstructive pulmonary disease, unspecified           ORDERS:         Meds Prescribed:       [Refilled] HYDROcodone-acetaminophen  mg oral tablet [One PO BID PRN], #60 (sixty) tablets, Refills: 0 (zero)         Lab Orders:       [id number]*  Hgb A1c fast lab  (In-House)                      Plan:         Type 1 diabetes mellitus with diabetic chronic kidney diseasegreat improvement, no change in meds/insulin at this time, cont diet and exercise for wt loss    LABORATORY:  Labs ordered to be performed today include Hgb A1c inhouse fast lab.            Orders:       [id number]*  Hgb A1c fast lab  (In-House)              Low back painpain meds refilled          Prescriptions:       [Refilled] HYDROcodone-acetaminophen  mg oral tablet [One PO BID PRN], #60 (sixty) tablets, Refills: 0 (zero)         Morbid (severe) obesity due to excess calorieshe is to cont to work on diet and exercise        Chronic obstructive pulmonary disease, unspecifiedchronic COPD, he is overall stable on meds and doing well            Charge Capture:         Primary Diagnosis:     E10.22  Type 1 diabetes mellitus with diabetic chronic kidney disease           Orders:      [id number]  Office/outpatient visit; established patient, level 4  (In-House)            [id number]*  Hgb A1c fast lab  (In-House)              M54.5  Low back pain     E66.01  Morbid (severe) obesity due to excess calories     J44.9  Chronic obstructive pulmonary disease, unspecified

## 2021-05-18 NOTE — PROGRESS NOTES
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  Specifically, this is type 1 diabetes, complicated by nephropathy and peripheral neuropathy.  Compliance with treatment has been poor; he skips some insulin doses due to forgetfulness and inconvenience of dosing and does not follow a diet and exercise regimen.      Tobacco screen: Non-smoker.  Current meds include insulin/injectable ( basaglar 35 units nightly, SSI and bydureon weekly ), aspirin, and a lipid lowering agent.  He reports home blood glucose readings have averaged fasting readings in the 2-300 until last 2 days-130 and 120 mg/dL range. He checks his glucose 1 to 2 times daily.  Most recent lab results include Hemoglobin A1c:  10.1 (%) (11/11/2019), Creatinine, Serum:  1.53 (mg/dl) (11/11/2019), Glom Filt Rate, Est:  51 (ml/min/1.73m2) (11/11/2019), Hematocrit:  37.7 (%) (09/03/2019), Hemoglobin:  11.60 (gm/dl) (09/03/2019), Alkaline Phosphatase:  136 (U/L) (11/11/2019), ALT (SGPT):  12 (U/L) (11/11/2019), AST (SGOT):  18 (U/L) (11/11/2019), HDL:  37 (mg/dL) (11/11/2019), LDL:  58 (mg/dL) (11/11/2019), Microalbumin, Urine, rand:  926.0 (mg/L) (11/11/2019), Total Cholesterol:  129 (mg/dL) (11/11/2019), Triglycerides:  172 (mg/dL) (11/11/2019).  Has not fallen recently In regard to preventative care, his last ophthalmology exam was in 4/2019.        Gómez is doing telehealth video today with his home health nurse Mel and he is doing much better since the addition of doxycycline for a CXR  that showed probable multifocal pneumonia. He is  on 2 liters oxygen, on his inhalers as prescribed,  hardly using his nebulized albuterol-I advised him to use at least 3 X per day.  His magnesium is still low so he needs to take his magnesium tid-he is very non compliant with his meds, so I d/w him today getting his meds prepackaged at [facility] and he is ok with us proceeding in this direction.  His BS are running consistently < 130.  He is eating well and his BM are normal.  He has some generalized weakness from being sick, but is overall doing well.  No complaints today    ROS:     CONSTITUTIONAL:  Positive for fatigue.   Negative for chills or fever.      EYES:  Negative for blurred vision and eye pain.      E/N/T:  Positive for nasal congestion.   Negative for ear pain, frequent rhinorrhea or sore throat.      CARDIOVASCULAR:  Negative for chest pain, orthopnea, paroxysmal nocturnal dyspnea and pedal edema.      RESPIRATORY:  Positive for recent cough.   Negative for dyspnea, hemoptysis or frequent wheezing.      GASTROINTESTINAL:  Negative for abdominal pain, heartburn, constipation, diarrhea, and stool changes.      INTEGUMENTARY:  Negative for rash.      NEUROLOGICAL:  Positive for weakness ( generalized ).   Negative for dizziness or headaches.      PSYCHIATRIC:  Negative for anxiety, depression, sleep disturbance and suicidal thoughts.          Past Medical History / Family History / Social History:         Last Reviewed on 3/31/2020 10:34 AM by Kimmy Riley    Past Medical History:     Use of high risk medications     MRSA pneumonia     Chronic low back pain     Closed fracture of other tarsal and metatarsal bones     Eczema     Low back pain     Type 2 diabetes     Chronic bronchitis, mucopurulent     Diabetes with neurological manifestations, type II or unspecified type, not stated as uncontrolled     Allergies     Bronchiectasis     COPD     Hypercholesterolemia     Type II DM     Hypertension     GERD     Peripheral neuropathy attributed to type  "II diabetes     Erectile dysfunction due to organic reasons                 PREVENTIVE HEALTH MAINTENANCE             EYE EXAM: was last done 4/24/19         Surgical History:         Cholecystectomy    Tonsillectomy/Adenoidectomy     L knee;    venous port - L chest;         Family History:         Positive for Coronary Artery Disease ( mother ) and Hypertension ( mother ).      Positive for Cancer- type not specified ( sister ).      Positive for Asthma ( father ).      Positive for Type 2 Diabetes ( mother ).          Social History:     Occupation: Disabled (due to COPD)     Marital Status:      Children: 2 children         Tobacco/Alcohol/Supplements:     Last Reviewed on 3/31/2020 10:11 AM by Leonela Melendez    Tobacco: He has a past history of cigarette smoking; quit date:  1993.  Non-drinker         Substance Abuse History:     Last Reviewed on 3/18/2020 04:53 PM by Pawan Teresa        Mental Health History:     Last Reviewed on 3/18/2020 04:53 PM by Pawan Teresa        Communicable Diseases (eg STDs):     Last Reviewed on 3/18/2020 04:53 PM by Pawan Teresa        Allergies:     Last Reviewed on 3/31/2020 09:29 AM by Olesya Garcíaryl:      Proventil: candidiasis  (Adverse Reaction)        Current Medications:     Last Reviewed on 3/31/2020 09:29 AM by Olesya García    atorvastatin 20 mg oral tablet [take 1 tablet (20 mg) by oral route once daily]    insulin lispro 100 unit/mL subcutaneous Insulin Pen [inject by subcutaneous route per prescriber's instructions AC AND HS while on Steriods]    Blood Glucose Test strips  [Check  blood sugars AC HS. Dispense brand covered by insurance.e11.9]    HYDROcodone-acetaminophen  mg oral tablet [One PO BID PRN]    BD Ultra-Fine Mini Pen Needle 31 gauge x 3/16\"  [use w/ trulicity & basaglar]    omeprazole 40 mg oral capsule,delayed release (enteric coated) [Take 1 capsule(s) by mouth daily]    loratadine 10 mg oral tablet [TAKE 1 TABLET(S) " BY MOUTH DAILY]    albuterol sulfate 90 mcg/actuation Inhalation HFA Aerosol Inhaler [INHALE 2 PUFF(S) BY MOUTH 4 TIMES A DAY AS NEEDED]    dilTIAZem HCl 120 mg oral Capsule, Extended Release 24 hr [TAKE 1 CAPSULE BY MOUTH EVERY DAY]    Bydureon 2 mg/0.65 mL subcutaneous Pen Injector [Inject 2 mg subcutaneously q week]    metFORMIN 500 mg oral Tablet, Extended Release 24 hr [2T PO BID]    atorvastatin 20 mg oral tablet [TAKE 1  TABLET PO Q HS]    Breo Ellipta 200mcg/25mcg Inhalation Powder [Take 1 inhalation(s) by mouth daily]    furosemide 40 mg oral tablet [1T PO QD]    hydroCHLOROthiazide 12.5 mg oral capsule [TAKE 1 CAPSULE BY MOUTH TWICE DAILY]    Gabapentin 400 mg oral capsule [1 TAB BID]    Eliquis 5mg Tablet [take 1 tab BID]    losartan 100 mg oral tablet [take 1 tablet (100 mg) by oral route once daily]    traZODone 150 mg oral tablet [take 1 tablet (150 mg) by oral route qhs]    Cymbalta 60 mg oral capsule,delayed release (enteric coated) [take 1 capsule (60 mg) by oral route once daily]    ipratropium-albuterol 0.5 mg-3 mg(2.5 mg base)/3 mL Inhalation Solution for Nebulization [inhale 3 milliliters by nebulization route 4 times per day as needed]    Lotrisone 1-0.05 % Topical Cream [apply to the affected and surrounding areas of skin by topical route 2 times per day in the morning and evening]    BASAGLAR INJ 100UNIT Milliliters  [INJECT 40 UNITS UNDER THE SKIN EACH NIGHT AT BEDTIME]    potassium chloride 10 mEq oral capsule, extended release [take 1 capsule (10 meq) daily ]    Zofran 8 mg oral tablet [ODT FORM.  ONE Q SIX HOURS PRN N/V]    metoprolol tartrate 50 mg oral tablet [take 1 tablet (50 mg) by oral route 2 times per day with meals]    calcium citrate 250 mg calcium oral tablet [1 tab PO daily]    magnesium oxide 400 mg magnesium oral tablet [take one tablet tid]    ferrous sulfate 325 mg (65 mg iron) oral tablet [take 1 tablet (325 mg) by oral route once daily]    lancets  [check blood sugar ac  and hs E11.9]    doxycycline monohydrate 100 mg oral capsule [take 1 capsule (100 mg) by oral route 2 times per day times 10 days]        Objective:        Vitals:         Current: 3/31/2020 10:40:25 AM    Ht:  5 ft, 9 inT: 97.6 F (oral);  BP: 118/60 mm Hg (left arm, sitting);  P: 76 bpm (finger clip, sitting);  sCr: 1.07 mg/dL;  GFR: 66.17        Exams:     PHYSICAL EXAM:     GENERAL: Vitals recorded well groomed;  no apparent distress;     RESPIRATORY: normal appearance and symmetric expansion of chest wall; normal respiratory rate and pattern with no distress; expiratory wheezes in the LLL per Elana his home health nurse;     NEUROLOGIC: mental status: oriented to person, place, and time;  GROSSLY INTACT     PSYCHIATRIC:  appropriate affect and demeanor; normal speech pattern; grossly normal memory;         Assessment:         E10.22   Type 1 diabetes mellitus with diabetic chronic kidney disease       J44.1   Chronic obstructive pulmonary disease with (acute) exacerbation       J18.9   Pneumonia, unspecified organism       E83.42   Hypomagnesemia       I10   Essential (primary) hypertension       I48.0   Paroxysmal atrial fibrillation           Plan:         Type 1 diabetes mellitus with diabetic chronic kidney diseasevery well controlled, recommend yearly eye exam and flu vaccination, cont SSI, basaglar 35 units nightly and weekly bydureon        Chronic obstructive pulmonary disease with (acute) exacerbationnebulized albuterol-I advised him to use at least 3 X per day.        Pneumonia, unspecified organismcont 2 liters O2 continuous, start his albuterol nebs TID scheduled, cont breo, and complete doxycycline X 10 daysHe is eating well and his BM are normal.  He has some generalized weakness from being sick, but is overall doing well.  No complaints today    Telehealth: Verbal consent obtained for visit to occur via televideo conferencing; Staff, other than provider, present during telephone visit include  benny, nursing with home health, tristen araujo, nurse; Total time spent was 16 minutes         HypomagnesemiaHis magnesium is still low so he needs to take his magnesium tid-he is very non compliant with his meds, so I d/w him today getting his meds prepackaged at [facility] and he is ok with us proceeding in this direction. Repeat Mg lab in 1 week        Essential (primary) hypertensionstable and well controlled        Paroxysmal atrial fibrillationoverall doing well, no acute symptoms of afib            Charge Capture:         Primary Diagnosis:     E10.22  Type 1 diabetes mellitus with diabetic chronic kidney disease           Orders:      [id number]  Office/outpatient visit; established patient, level 4  (In-House)              J44.1  Chronic obstructive pulmonary disease with (acute) exacerbation     J18.9  Pneumonia, unspecified organism     E83.42  Hypomagnesemia     I10  Essential (primary) hypertension     I48.0  Paroxysmal atrial fibrillation

## 2021-05-18 NOTE — PROGRESS NOTES
Preston Wallis [place]ERCam[place] 1965     Office/Outpatient Visit    Visit Date: Mon, Oct 8, 2018 03:49 pm    Provider: Kimmy Riley MD (Assistant: Sarah Spurling, MA)    Location: [facility]        Electronically signed by Kimmy Riley MD on  10/10/2018 09:02:10 AM                             SUBJECTIVE:        CC: f/u of low BP and diabetes         HPI:         Gómez presents with hypertension.  His current cardiac medication regimen includes an ACE inhibitor ( Zestril ).  He is tolerating the medication well without side effects.  Compliance with treatment has been good; he takes his medication as directed and follows up as directed.          Concerning iDDM, specifically, this is type 1 diabetes, complicated by peripheral neuropathy.  Compliance with treatment has been good; he takes his medication as directed and is keeping a glucose diary.  He denies experiencing any diabetes related symptoms.  Depression screen is performed and is negative.      Tobacco screen: Non-smoker.  Current meds include an oral hypoglycemic ( Actos, Glucotrol, and bydureon ), insulin/injectable ( basaglar ), aspirin, and a lipid lowering agent.  He does not perform home blood glucose monitoring.  Most recent lab results include Hemoglobin:  12.80 (g/dl) (01/30/2018), Hematocrit:  38.4 (%) (01/30/2018), Creatinine, Serum:  1.07 (mg/dl) (05/23/2018), Glom Filt Rate, Est:  >60 (ml/min/1.73m2) (05/23/2018), Total Cholesterol:  126 (mg/dL) (05/23/2018), HDL:  39 (mg/dL) (05/23/2018), Triglycerides:  162 (mg/dL) (05/23/2018), LDL:  55 (mg/dL) (05/23/2018), Hemoglobin A1c:  8.5 (07/06/2018).  Has not fallen recently In regard to preventative care, his last ophthalmology exam was in 10/2017.      Gómez is following up for his face to face, he is on chronic hydrocodone 10/325 bid for foot PN and LBP pain, increased to qid s/p fall, he is stable on meds and tolerating meds well, he is functional with his pain.  He is on permanent  disability.  No signs of abuse or diversion.          ROS:     CONSTITUTIONAL:  Positive for fatigue.   Negative for chills, fever or weight change.      E/N/T:  Negative for nasal congestion and frequent rhinorrhea.      CARDIOVASCULAR:  Positive for pedal edema.   Negative for chest pain, orthopnea or paroxysmal nocturnal dyspnea.      RESPIRATORY:  Positive for chronic cough and dyspnea.      GASTROINTESTINAL:  Positive for nausea and dry heaving.   Negative for constipation or diarrhea.      MUSCULOSKELETAL:  Positive for right closed humeral fracture.      INTEGUMENTARY:  Positive for right knee abrasion and toe ulcer.      NEUROLOGICAL:  Positive for dizziness ( with standing ).   Negative for headaches or weakness.          PMH/FMH/SH:     Last Reviewed on 10/08/2018 04:27 PM by Kimmy Riley    Past Medical History:     Use of high risk medications     MRSA pneumonia     Chronic low back pain     Closed fracture of other tarsal and metatarsal bones     Eczema     Low back pain     Type 2 diabetes     Chronic bronchitis, mucopurulent     Diabetes with neurological manifestations, type II or unspecified type, not stated as uncontrolled     Allergies     Bronchiectasis     COPD     Hypercholesterolemia     Type II DM     Hypertension     GERD     Peripheral neuropathy attributed to type II diabetes     Erectile dysfunction due to organic reasons             Surgical History:         Tonsillectomy/Adenoidectomy      L knee;    venous port - L chest;         Family History:         Positive for Coronary Artery Disease ( mother ) and Hypertension ( mother ).      Positive for Cancer- type not specified ( sister ).      Positive for Asthma ( father ).      Positive for Type 2 Diabetes ( mother ).          Social History:     Occupation: Disabled (due to COPD)     Marital Status:      Children: 2 children         Tobacco/Alcohol/Supplements:     Last Reviewed on 10/08/2018 04:27 PM by Kimmy Riley  "[place]    Tobacco: He has a past history of cigarette smoking; quit date:  1993.  Non-drinker         Substance Abuse History:     Last Reviewed on 5/22/2013 03:43 PM by Jorgito Ames            Allergies:     Last Reviewed on 10/02/2018 03:07 PM by Raquel Pierce: candidiasis (Adverse Reaction)        Current Medications:     Last Reviewed on 10/08/2018 03:52 PM by Spurling, Sarah C    Hydrocodone/Acetaminophen 10mg/325mg Tablet One PO BID PRN     Basaglar KwikPen 100units/1ml Injection Inject 15 units daily Dx E11.9     Levemir Flex Pen* pen 15 units daily     Cardizem CD  120mg Capsules, Extended Release Take 1 capsule(s) by mouth daily     Metformin HCl 500mg Tablets, Extended Release 2 po bid     Bydureon 2mg/1pen Injection Suspension, Extended-Release Inject 2 mg subcutaneously q week     Lisinopril 10mg Tablet 1/2 tab daily     Amitriptyline HCl 25mg Tablet Take 1 tablet(s) by mouth at bedtime     Actos 45mg Tablet Take 1 tablet(s) by mouth daily     Trazodone HCl 100mg Tablet 1 tab HS     Loratadine 10mg Tablet Take 1 tablet(s) by mouth daily     Omeprazole 40mg Capsules, Extended Release Take 1 capsule(s) by mouth daily     BD Insulin Syringe 1ml Syringe Use with Lantus at HS dx e11.9     Symbicort 160mcg/4.5mcg Oral Inhaler 1 puff BID     Breo Ellipta 200mcg/25mcg Inhalation Powder Take 1 inhalation(s) by mouth daily     Albuterol 0.083% Nebulizer Solution 1 vial(s) by nebulizer qid as directed     Hydrochlorothiazide (HCTZ) 12.5mg Tablet 1 po daily     Montelukast Sodium 10mg Tablet 1 tab daily     Ventolin HFA 90mcg/1actuation Oral Inhaler Inhale 2 puff(s) by mouth 4 times a day as needed     BD Ultra-Fine Mini Pen Needle 31G x 3/16\"  Pen Needle Use TID with insulin.     Aspirin (ASA) 81mg Tablets, Enteric Coated 1 tab daily     Glucose Reagent Blood Test Strips  Reagent Strips Check blood sugar 1-2 times per day E11.9     Humalog 100units/1ml Injection give 2 units if BS between " 150-199, 4units for 200-249, 6 units for 250-349, 8 units if BS>350     Lancet   Lancet 1-2 times daily w/ one touch verio Dx E11.9     Atorvastatin Calcium 20mg Tablet 1 po q hs         OBJECTIVE:        Vitals:         Current: 10/8/2018 3:54:42 PM    Ht:  5 ft, 9 in;  Wt: 295.6 lbs;  BMI: 43.7    T: 98.4 F (oral);  BP: 185/79 mm Hg (left arm, sitting);  P: 100 bpm (left arm (BP Cuff), standing);  sCr: 1.07 mg/dL;  GFR: 94.33        Exams:     PHYSICAL EXAM:     GENERAL: Vitals recorded well developed, well nourished;  well groomed;  no apparent distress;     E/N/T: OROPHARYNX:  normal mucosa, dentition, gingiva, and posterior pharynx;     NECK:  supple, full ROM; no thyromegaly; no carotid bruits;     RESPIRATORY: CTA B, no wheezing/rales/rhonchi     CARDIOVASCULAR: normal rate; rhythm is regular;  normal S1; normal S2; no systolic murmur; no cyanosis; no edema;     MUSCULOSKELETAL: gait: affected by a limp and unsteady;     NEUROLOGICAL:  cranial nerves, motor and sensory function, reflexes, gait and coordination are all intact;     PSYCHIATRIC:  appropriate affect and demeanor; normal speech pattern; grossly normal memory;     Left foot exam    Protective sensation using Monofilament test: Loss of protective sensation. Approximately 4 grams of force is applied without sensory awareness.    Vascular status: normal peripheral vascular exam with palpable dorsal pedal and posterior tibal pulses and brisk digital capillary refill    Skin is intact without sores or ulcers    Right foot exam    Protective sensation using Monofilament test: Decreased, with estimated force of 2 grams applied.    Vascular deficit noted in the dorsal pedal artery and the posterior tibial artery     L great toe-open non  healing non infect ulcer L foot         Lab/Test Results:             Amphetamines Screen, Urin:  Negative (10/08/2018),     BAR-Barbiturates Screen, Urin:  Negative (10/08/2018),     Buprenorphine:  Negative (10/08/2018),      BZO-Benzodiazepines Screen,Ur:  Negative (10/08/2018),     Cocaine(Metab.)Screen, Ur:  Negative (10/08/2018),     MDMA-Ecstasy:  Negative (10/08/2018),     Met-Methamphetamine:  Negative (10/08/2018),     MTD-Methadone Screen, Urine:  Negative (10/08/2018),     Opiate Screen, Urine:  Positive (10/08/2018),     OXY-Oxycodone:  Negative (10/08/2018),     PCP-Phencyclidine Screen, Uri:  Negative (10/08/2018),     THC Cannabinoids Screen, Urin:  Negative (10/08/2018),     Urine temperature:  confirmed (10/08/2018),     Date and time of last pill:  Norco 10/8/18 8am./[facility] (10/08/2018),     Performed by:  pr (10/08/2018),     Collection Time:  17:07 (10/08/2018),             Procedures:     Vaccination against other viral diseases, Influenza     1. Influenza, seasonal PF (children 3 years to adult): 0.5 ml unit dose given IM in the left upper arm; administered by SCS;  lot number [id number]; expires 6-30-19             ASSESSMENT           401.1   I10  Hypertension              DDx:     250.01   E10.65  IDDM              DDx:     780.57   G47.33  Obstructive sleep apnea              DDx:     272.0   E78.4  Hypercholesterolemia              DDx:     250.60   E10.49  Peripheral neuropathy attributed to type II diabetes              DDx:     491.21   J44.9  Decompensated chronic obstructive pulmonary disease (COPD)              DDx:     V58.69   Z79.899  Use of high risk medications              DDx:     724.2   M54.5  Chronic low back pain              DDx:     707.14   L97.506  Foot ulcer              DDx:     V04.81   Z23  Vaccination against other viral diseases, Influenza              DDx:         ORDERS:         Meds Prescribed:       Refill of: Lisinopril 5mg Tablet 1 tab daily  #90 (Ninety) tablet(s) Refills: 0       Refill of: Hydrochlorothiazide (HCTZ) 12.5mg Tablet 1 po daily  #90 (Ninety) tablet(s) Refills: 0         Lab Orders:       [id number]  DIAB2 - [facility] CMP A1C LIPID AND MICRO ALBUM CR RATIO:  [id number]  (Send-Out)         82167  Drug test prsmv read direct optical obs pr date  (In-House)           Procedures Ordered:       REFER  Referral to Specialist or Other Facility  (Send-Out)           Other Orders:       2028F  Foot examination performed (includes examination through visual inspection, sensory exam with monofi  (In-House)         22769  Influenza virus vaccine, quadrivalent, split virus, preservative free 3 years of age & older  (In-House)           Administration of influenza virus vaccine (x1)                 PLAN:          Hypertension restart BP meds lisinopril and hctz, his BP now high           Prescriptions:       Refill of: Lisinopril 5mg Tablet 1 tab daily  #90 (Ninety) tablet(s) Refills: 0       Refill of: Hydrochlorothiazide (HCTZ) 12.5mg Tablet 1 po daily  #90 (Ninety) tablet(s) Refills: 0          IDDM on statin and restarted ace, BS running 150-200, he needs to increase his basaglar to 15 units daily, he is due for his eye exam end of this month, he needs diabetic shoes and he wont wear them     LABORATORY:  Labs ordered to be performed today include Diabetes Panel 2;CMP, A1C, Lipid, Microalbumin:Creatinine Ratio.            Orders:       [id number]  DIAB2 - [facility] CMP A1C LIPID AND MICRO ALBUM CR RATIO: [id number]  (Send-Out)            Obstructive sleep apnea he is noncompliant with his cpap          Hypercholesterolemia due for labs, on atorvastatin          Peripheral neuropathy attributed to type II diabetes he has diabetic shoes and he is non compliant          Decompensated chronic obstructive pulmonary disease (COPD) f/u pulmonology          Use of high risk medications     christine reviewed, drug screen performed and appropriate, consent is reviewed and signed and on the chart, he is aware of risk of addiction on this medication and understands that he will need to follow up for a review every 3 months and his medications will be adjusted or  decreased as deemed appropriate at each visit.  No personal history of drug or alcohol abuse.  No concerns about diversion or abuse.  He denies side effects related to the medication.  He is  aware that she may be called in for pill counts           Orders:       98151  Drug test prsmv read direct optical obs pr date  (In-House)            Chronic low back pain stable on chronic pain pills bid dosing          Foot ulcer referal to wound care for L great toe ulcer and diabetes         REFERRALS:  Referral initiated to referal to wound care for L great toe ulcer and diabetes.            Orders:       REFER  Referral to Specialist or Other Facility  (Send-Out)            Vaccination against other viral diseases, Influenza           Orders:       04277  Influenza virus vaccine, quadrivalent, split virus, preservative free 3 years of age & older  (In-House)                     Administration of influenza virus vaccine (x1)             Other Orders:       2028F  Foot examination performed (includes examination through visual inspection, sensory exam with monofi  (In-House)           Patient Recommendations:        For  Foot ulcer:     I also recommend referal to wound care for L great toe ulcer and diabetes.              CHARGE CAPTURE           **Please note: ICD descriptions below are intended for billing purposes only and may not represent clinical diagnoses**        Primary Diagnosis:         401.1 Hypertension            I10    Essential (primary) hypertension              Orders:          95054   Office/outpatient visit; established patient, level 4  (In-House)           250.01 IDDM            E10.65    Type 1 diabetes mellitus with hyperglycemia    780.57 Obstructive sleep apnea            G47.33    Obstructive sleep apnea (adult) (pediatric)    272.0 Hypercholesterolemia            E78.4    Other hyperlipidemia    250.60 Peripheral neuropathy attributed to type II diabetes            E10.49    Type 1 diabetes  mellitus with other diabetic neurological complication    491.21 Decompensated chronic obstructive pulmonary disease (COPD)            J44.9    Chronic obstructive pulmonary disease, unspecified    V58.69 Use of high risk medications            Z79.899    Other long term (current) drug therapy              Orders:          49227   Drug test prsmv read direct optical obs pr date  (In-House)           724.2 Chronic low back pain            M54.5    Low back pain    707.14 Foot ulcer            L97.506    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 without evidence of necrosis    V04.81 Vaccination against other viral diseases, Influenza            Z23    Encounter for immunization              Orders:          41408   Influenza virus vaccine, quadrivalent, split virus, preservative free 3 years of age & older  (In-House)                                           Administration of influenza virus vaccine (x1)             Other Orders:           2028F   Foot examination performed (includes examination through visual inspection, sensory exam with monofi  (In-House)

## 2021-05-18 NOTE — PROGRESS NOTES
Preston Wallis WANDER  1965     Office/Outpatient Visit    Visit Date: Wed, Jun 17, 2020 12:09 pm    Provider: Kimmy Riley MD (Assistant: Cindy Lopez MA)    Location: [facility]        Electronically signed by Kimmy Riley MD on  06/25/2020 03:00:53 PM                             Subjective:        CC: fall with knee swelling    HPI:       Gómez has had 2 falls in past month, due to  tripping, both times he injured and twisted his L knee, xray of L knee was normal and he is finally improving, pain is mild, swelling in moderate, and he has been taking more of his pain meds due to recent falls with injury        In addition Gómez has noticed his memory is worse, primarily short term memory, like he goes shopping and forgets what he bought.          Home health is still seeing him for his copd, PT is completed, but with his falls I recommend he restart PT          Concerning type 1 diabetes mellitus with diabetic chronic kidney disease, specifically, this is type 1 diabetes, complicated by nephropathy and peripheral neuropathy.  Compliance with treatment has been poor; he skips some insulin doses due to forgetfulness and inconvenience of dosing and does not follow a diet and exercise regimen.      Tobacco screen: Non-smoker.  Current meds include insulin/injectable ( basaglar 35 units nightly, SSI and bydureon weekly ), aspirin, and a lipid lowering agent.  He reports home blood glucose readings have averaged fasting readings in the 2-300 until last 2 days-130 and 120 mg/dL range. He checks his glucose 1 to 2 times daily.  Most recent lab results include Creatinine, Serum:  1.07 (mg/dl) (03/17/2020), Glom Filt Rate, Est:  >60 (ml/min/1.73m2) (03/17/2020), Hematocrit:  32.0 (%) (03/17/2020), Hemoglobin:  10.10 (gm/dl) (03/17/2020), TSH:  0.580 (mIU/L) (03/17/2020), Hemoglobin A1c:  11.8 (02/11/2020), Microalbumin, Urine, rand:  926.0 (mg/L) (11/11/2019), Total Cholesterol:  129 (mg/dL)  (11/11/2019), HDL:  37 (mg/dL) (11/11/2019), Triglycerides:  172 (mg/dL) (11/11/2019), LDL:  58 (mg/dL) (11/11/2019).  Has not fallen recently In regard to preventative care, his last ophthalmology exam was in 4/2019.            Additionally, he presents with history of essential (primary) hypertension.  his current cardiac medication regimen includes a diuretic ( Lasix 40 mg daily and hydrochlorothiazide 12.5 mg daily ), a beta-blocker ( Metoprolol 50 mg twice daily ), a calcium channel blocker ( Cardizem 120 mg daily ), and an angiotensin receptor blocker ( Losartan 50 mg daily ).  Compliance with treatment has been fair; he Patient reports good compliance but cannot name his medications or how he takes them..  He is tolerating the medication well without side effects.  He has not kept a blood pressure diary, but states that pressures have been okay.      ROS:     CONSTITUTIONAL:  Negative for chills and fever.      EYES:  Negative for blurred vision and eye pain.      E/N/T:  Positive for nasal congestion.   Negative for ear pain, frequent rhinorrhea or sore throat.      CARDIOVASCULAR:  Negative for chest pain, orthopnea, paroxysmal nocturnal dyspnea and pedal edema.      RESPIRATORY:  Positive for persistent cough ( typically dry ).   Negative for dyspnea, hemoptysis or frequent wheezing.      GASTROINTESTINAL:  Negative for abdominal pain, heartburn, constipation, diarrhea, and stool changes.      INTEGUMENTARY:  Negative for rash.      NEUROLOGICAL:  Negative for dizziness and headaches.      PSYCHIATRIC:  Negative for anxiety, depression, and sleep disturbances.          Past Medical History / Family History / Social History:         Last Reviewed on 6/17/2020 12:48 PM by Kimmy Riley    Past Medical History:     Use of high risk medications     MRSA pneumonia     Chronic low back pain     Closed fracture of other tarsal and metatarsal bones     Eczema     Low back pain     Type 2 diabetes     Chronic  "bronchitis, mucopurulent     Diabetes with neurological manifestations, type II or unspecified type, not stated as uncontrolled     Allergies     Bronchiectasis     COPD     Hypercholesterolemia     Type II DM     Hypertension     GERD     Peripheral neuropathy attributed to type II diabetes     Erectile dysfunction due to organic reasons                 PREVENTIVE HEALTH MAINTENANCE             EYE EXAM: was last done 4/24/19         Surgical History:         Cholecystectomy    Tonsillectomy/Adenoidectomy     L knee;    venous port - L chest;         Family History:         Positive for Coronary Artery Disease ( mother ) and Hypertension ( mother ).      Positive for Cancer- type not specified ( sister ).      Positive for Asthma ( father ).      Positive for Type 2 Diabetes ( mother ).          Social History:     Occupation: Disabled (due to COPD)     Marital Status:      Children: 2 children         Tobacco/Alcohol/Supplements:     Last Reviewed on 6/17/2020 12:14 PM by Cindy Lopez    Tobacco: He has a past history of cigarette smoking; quit date:  1993.  Non-drinker         Substance Abuse History:     Last Reviewed on 4/09/2020 12:12 PM by Pawan Teresa        Mental Health History:     Last Reviewed on 4/09/2020 12:12 PM by Pawan Teresa        Communicable Diseases (eg STDs):     Last Reviewed on 4/09/2020 12:12 PM by Pawan Teresa        Allergies:     Last Reviewed on 6/17/2020 12:14 PM by Cindy Lopez    Benadryl:      Proventil: candidiasis  (Adverse Reaction)        Current Medications:     Last Reviewed on 6/17/2020 12:48 PM by Kimmy Riley    aspirin 81 mg oral tablet, delayed release (enteric coated) [take 1 tablet (81 mg) by oral route once daily]    Blood Glucose Test strips  [Check  blood sugars AC HS. Dispense brand covered by insurance.e11.9]    HYDROcodone-acetaminophen  mg oral tablet [One PO BID PRN]    BD Ultra-Fine Mini Pen Needle 31 gauge x 3/16\"  [use w/ " trulicity & basaglar]    omeprazole 40 mg oral capsule,delayed release (enteric coated) [TAKE 1 CAPSULE(S) BY MOUTH DAILY]    loratadine 10 mg oral tablet [TAKE 1 TABLET(S) BY MOUTH DAILY]    albuterol sulfate 90 mcg/actuation Inhalation HFA Aerosol Inhaler [INHALE 2 PUFF(S) BY MOUTH 4 TIMES A DAY AS NEEDED]    dilTIAZem HCl 120 mg oral Capsule, Extended Release 24 hr [TAKE 1 CAPSULE BY MOUTH EVERY DAY]    Bydureon 2 mg/0.65 mL subcutaneous Pen Injector [Inject 2 mg subcutaneously q week]    metFORMIN 500 mg oral Tablet, Extended Release 24 hr [2T PO BID]    Breo Ellipta 200mcg/25mcg Inhalation Powder [Take 1 inhalation(s) by mouth daily]    furosemide 40 mg oral tablet [1T PO QD]    Gabapentin 400 mg oral capsule [1 TAB BID]    Eliquis 5 mg oral tablet [1T PO BID]    losartan 100 mg oral tablet [take 1 tablet (100 mg) by oral route once daily]    traZODone 150 mg oral tablet [take 1 tablet (150 mg) by oral route qhs]    DULoxetine 60 mg oral capsule,delayed release (enteric coated) [1C PO QD]    ipratropium-albuterol 0.5 mg-3 mg(2.5 mg base)/3 mL Inhalation Solution for Nebulization [inhale 3 milliliters by nebulization route 4 times per day as needed]    Lotrisone 1-0.05 % Topical Cream [apply to the affected and surrounding areas of skin by topical route 2 times per day in the morning and evening]    BASAGLAR INJ 100UNIT Milliliters  [INJECT 40 UNITS UNDER THE SKIN EACH NIGHT AT BEDTIME]    potassium chloride 10 mEq oral tablet, extended release [1T PO QD]    Zofran 8 mg oral tablet [ODT FORM.  ONE Q SIX HOURS PRN N/V]    metoprolol tartrate 50 mg oral tablet [take 1 tablet (50 mg) by oral route 2 times per day with meals]    Calcitrate 200 mg (950 mg) oral tablet [1T PO QD]    calcium citrate 250 mg calcium oral tablet [1 tab PO daily]    magnesium oxide 400 mg magnesium oral tablet [take one tablet tid]    ferrous sulfate 325 mg (65 mg iron) oral tablet [take 1 tablet (325 mg) by oral route once daily]     lancets  [check blood sugar ac and hs E11.9]    blood pressure monitor kit  [patient to monitor b/p twice daily DX I 10]    atorvastatin 20 mg oral tablet [TAKE 1  TABLET PO Q HS]        Objective:        Vitals:         Current: 6/17/2020 12:16:25 PM    Ht:  5 ft, 9 in;  Wt: 276.6 lbs;  BMI: 40.8T: 97.1 F (oral);  BP: 122/59 mm Hg (left arm, sitting);  P: 103 bpm (left arm (BP Cuff), sitting);  sCr: 1.07 mg/dL;  GFR: 90.68        Exams:     PHYSICAL EXAM:     GENERAL: Vitals recorded well developed, well nourished;  well groomed;  no apparent distress;     EYES: PERRL, EOMI     NECK: supple;     RESPIRATORY: normal respiratory rate and pattern with no distress;     CARDIOVASCULAR: regular rate and rhythm; normal S1, S2; no murmur, rub, or gallop; normal PMI;     MUSCULOSKELETAL: gait: in wheel chair;  decreased ROM in L knee with diffuse edema, no erythema;     NEUROLOGIC: mental status: oriented to person, place, and time;  GROSSLY INTACT     PSYCHIATRIC:  appropriate affect and demeanor; normal speech pattern; grossly normal memory;         Lab/Test Results:         Amphetamines Screen, Urin: Negative (06/17/2020),     BAR-Barbiturates Screen, Urin: Negative (06/17/2020),     Buprenorphine: Negative (06/17/2020),     BZO-Benzodiazepines Screen,Ur: Negative (06/17/2020),     Cocaine(Metab.)Screen, Ur: Negative (06/17/2020),     MDMA-Ecstasy: Negative (06/17/2020),     Met-Methamphetamine: Negative (06/17/2020),     MTD-Methadone Screen, Urine: Negative (06/17/2020),     Opiate Screen, Urine: Positive (06/17/2020),     OXY-Oxycodone: Negative (06/17/2020),     PCP-Phencyclidine Screen, Uri: Negative (06/17/2020),     THC Cannabinoids Screen, Urin: Negative (06/17/2020),     Urine temperature: not confirmed (06/17/2020),     Date and time of last pill: hydrocodone, gabapentin 6/17/2020 @ 9am (06/17/2020),     Performed by: tyson (06/17/2020),     Collection Time: -- (06/17/2020),             Assessment:          W19.XXXS   Unspecified fall, sequela       M54.5   Low back pain       Z79.899   Other long term (current) drug therapy       G47.33   Obstructive sleep apnea (adult) (pediatric)       J44.9   Chronic obstructive pulmonary disease, unspecified       R41.81   Age-related cognitive decline       E10.22   Type 1 diabetes mellitus with diabetic chronic kidney disease       I10   Essential (primary) hypertension       D64.9   Anemia, unspecified       I48.0   Paroxysmal atrial fibrillation       M25.561   Pain in right knee           ORDERS:         Meds Prescribed:       [Refilled] HYDROcodone-acetaminophen  mg oral tablet [One PO BID PRN], #60 (sixty) tablets, Refills: 0 (zero)         Lab Orders:       12200  A1CEG - [facility] Hemoglobin A1C  (Send-Out)            93285  Drug test prsmv read direct optical obs pr date  (In-House)            96696  VB12 - [facility] Vitamin B12  (Send-Out)            12303  BDCB2 - [facility] CBC w/o diff  (Send-Out)            10896  IRON - [facility] Iron, serum  (Send-Out)                      Plan:         Low back painstable on meds        Other long term (current) drug therapy    christine reviewed, drug screen performed and appropriate, consent is reviewed and signed and on the chart, he is aware of risk of addiction on this medication and understands that he will need to follow up for a review every 3 months and his medications will be adjusted or decreased as deemed appropriate at each visit.  No personal history of drug or alcohol abuse.  No concerns about diversion or abuse.  He denies side effects related to the medication.  He is  aware that she may be called in for pill counts           Prescriptions:       [Refilled] HYDROcodone-acetaminophen  mg oral tablet [One PO BID PRN], #60 (sixty) tablets, Refills: 0 (zero)           Orders:       11049  Drug test prsmv read direct optical obs pr date  (In-House)              Obstructive sleep apnea (adult) (pediatric)cont oxygen and CPAP         Chronic obstructive pulmonary disease, unspecifiedstable on meds        Age-related cognitive declinewill check B12 levels        Type 1 diabetes mellitus with diabetic chronic kidney disease    LABORATORY:  Labs ordered to be performed today include HgbA1C.            Orders:       04472  A1CEG - [facility] Hemoglobin A1C  (Send-Out)              Essential (primary) hypertensionwell controlled        Anemia, unspecified    LABORATORY:  Labs ordered to be performed today include B12, CBC W/O DIFF, and Iron Serum.            Orders:       51987  VB12 - [facility] Vitamin B12  (Send-Out)            94950  BDCB2 - [facility] CBC w/o diff  (Send-Out)            84652  IRON - [facility] Iron, serum  (Send-Out)              Paroxysmal atrial fibrillationNSR today, on  metoprolol and eliquis        Pain in right kneeimproving, edema ongoing, will start him on a fitted knee sleeve            Charge Capture:         Primary Diagnosis:     W19.XXXS  Unspecified fall, sequela           Orders:      31003  Office/outpatient visit; established patient, level 4  (In-House)              M54.5  Low back pain     Z79.899  Other long term (current) drug therapy           Orders:      61942  Drug test prsmv read direct optical obs pr date  (In-House)              G47.33  Obstructive sleep apnea (adult) (pediatric)     J44.9  Chronic obstructive pulmonary disease, unspecified     R41.81  Age-related cognitive decline     E10.22  Type 1 diabetes mellitus with diabetic chronic kidney disease     I10  Essential (primary) hypertension     D64.9  Anemia, unspecified     I48.0  Paroxysmal atrial fibrillation     M25.561  Pain in right knee         ADDENDUMS:      ____________________________________    Addendum: 06/17/2020 01:24 PM - One, Team         Visit Note Faxed to:        [facility]; Number [phone]

## 2021-05-18 NOTE — PROGRESS NOTES
Preston Wallis  1965     Office/Outpatient Visit    Visit Date: Tue, Oct 13, 2020 03:54 pm    Provider: Kimmy Riley MD (Assistant: Sanjuanita Crocker LPN)    Location: [facility]        Electronically signed by Kimmy Riley MD on  10/15/2020 10:08:06 AM                             Subjective:        CC: Gómez is a 55 year old White male.  Spitting up blood x 3 days, this morning coughing up green stuff, SOA         HPI:           Patient to be evaluated for bronchiectasis with acute lower respiratory infection.  These have been present for the past one to two days.  The symptoms include cough, hemoptysis, sputum production, wheezing and shortness of breath.  He denies body aches, Chills, dizziness, fever, headache, nasal congestion, nasal discharge or sinus pain/pressure.  He has already tried to relieve the symptoms with Levaquin prescribed after sputum culture..  Medical history is significant for COPD.  Breo daily, Albuterol Of note, he was outside on Friday with his Grandchildren when the weather was cooler and they are now both sick.  He has no fever, no loss of taste/smell, no known covid exposureCT chest 2 weeks ago was neg for PE.  CXR today showed bronchietasis, WBC was wNL.    ROS:     CONSTITUTIONAL:  Negative for chills and fever.      EYES:  Negative for blurred vision and eye pain.      CARDIOVASCULAR:  Negative for chest pain, orthopnea, paroxysmal nocturnal dyspnea and pedal edema.      RESPIRATORY:  Positive for persistent cough ( typically dry ).   Negative for dyspnea, hemoptysis or frequent wheezing.      GASTROINTESTINAL:  Negative for abdominal pain, heartburn, constipation, diarrhea, and stool changes.      INTEGUMENTARY:  Negative for rash.      NEUROLOGICAL:  Negative for dizziness and headaches.      PSYCHIATRIC:  Negative for anxiety, depression, and sleep disturbances.          Past Medical History / Family History / Social History:         Last Reviewed on  10/13/2020 04:39 PM by Kimmy Riley    Past Medical History:     Use of high risk medications     MRSA pneumonia     Chronic low back pain     Closed fracture of other tarsal and metatarsal bones     Eczema     Low back pain     Type 2 diabetes     Chronic bronchitis, mucopurulent     Diabetes with neurological manifestations, type II or unspecified type, not stated as uncontrolled     Allergies     Bronchiectasis     COPD     Hypercholesterolemia     Type II DM     Hypertension     GERD     Peripheral neuropathy attributed to type II diabetes     Erectile dysfunction due to organic reasons                 PREVENTIVE HEALTH MAINTENANCE             EYE EXAM: was last done 4/24/19         Surgical History:         Cholecystectomy    Tonsillectomy/Adenoidectomy     L knee;    venous port - L chest;         Family History:         Positive for Coronary Artery Disease ( mother ) and Hypertension ( mother ).      Positive for Cancer- type not specified ( sister ).      Positive for Asthma ( father ).      Positive for Type 2 Diabetes ( mother ).          Social History:     Occupation: Disabled (due to COPD)     Marital Status:      Children: 2 children         Tobacco/Alcohol/Supplements:     Last Reviewed on 10/13/2020 03:59 PM by Sanjuanita Crocker    Tobacco: He has a past history of cigarette smoking; quit date:  1993.  Non-drinker         Substance Abuse History:     Last Reviewed on 4/09/2020 12:12 PM by Pawan Teresa        Mental Health History:     Last Reviewed on 4/09/2020 12:12 PM by Pawan Teresa        Communicable Diseases (eg STDs):     Last Reviewed on 4/09/2020 12:12 PM by Pawan Teresa        Allergies:     Last Reviewed on 9/21/2020 12:33 PM by Andrea Beckford    Benadryl:      Proventil: candidiasis  (Adverse Reaction)        Current Medications:     Last Reviewed on 9/21/2020 12:35 PM by Andrea Beckford    aspirin 81 mg oral tablet, delayed release (enteric coated) [take 1 tablet (81 mg)  "by oral route once daily]    Symbicort 160-4.5 mcg/actuation Inhalation HFA Aerosol Inhaler [inhale 2 puffs by inhalation route 2 times per day in the morning and evening]    insulin glargine 100 unit/mL subcutaneous Solution [inject by subcutaneous 50 units every morning]    insulin lispro 100 unit/mL subcutaneous Insulin Pen [inject by subcutaneous route 0-6 units as needed for high blood sugar]    Accu-Chek SmartView Test Strips  [CHECK BLOOD SUGAR BEFORE MEALS AND AT BEDTIME]    BD Ultra-Fine Mini Pen Needle 31 gauge x 3/16\"  [use w/ trulicity & basaglar]    omeprazole 40 mg oral capsule,delayed release (enteric coated) [TAKE 1 CAPSULE(S) BY MOUTH DAILY]    albuterol sulfate 90 mcg/actuation Inhalation HFA Aerosol Inhaler [INHALE 2 PUFF(S) BY MOUTH 4 TIMES A DAY AS NEEDED]    Bydureon 2 mg/0.65 mL subcutaneous Pen Injector [Inject 2 mg subcutaneously q week]    Eliquis 5 mg oral tablet [1T PO BID]    DULoxetine 60 mg oral capsule,delayed release (enteric coated) [1 po bid]    Lotrisone 1-0.05 % Topical Cream [apply to the affected and surrounding areas of skin by topical route 2 times per day in the morning and evening]    Calcitrate 200 mg (950 mg) oral tablet [1T PO QD]    magnesium oxide 400 mg (241.3 mg magnesium) oral tablet [TAKE ONE TABLET THREE TIMES DAILY]    ferrous sulfate 325 mg (65 mg iron) oral tablet [take 1 tablet (325 mg) by oral route once daily]    lancets  [check blood sugar ac and hs E11.9]    blood pressure monitor kit  [patient to monitor b/p twice daily DX I 10]    atorvastatin 20 mg oral tablet [TAKE 1 TABLET BY MOUTH EVERY AT BEDTIME]    traZODone 50 mg oral tablet [take 1 tablet (50 mg) by oral route qhs]    famotidine 40 mg oral tablet [take 1 tablet (40 mg) by oral route daily]    metoprolol tartrate 100 mg oral tablet [take 2 tablets by oral route in the am and one tablet in the PM]    dilTIAZem HCl 120 mg oral Capsule, Extended Release 24 hr [take 1 capsule (120 mg) by oral route " once daily]    hydrALAZINE 25 mg oral tablet [take 1 tablet (25 mg) by oral route 2 times per day with food]        Objective:        Vitals:         Current: 10/13/2020 4:03:20 PM    Ht:  5 ft, 9 inBP: 141/59 mm Hg (left arm, sitting);  P: 88 bpm (left arm (BP Cuff), sitting);  sCr: 1.13 mg/dL;  GFR: 61.95        Exams:     PHYSICAL EXAM:     GENERAL: Vitals recorded well developed, well nourished;  well groomed;  no apparent distress;     EYES: PERRL, EOMI     E/N/T: EARS:  normal external auditory canals and tympanic membranes;  grossly normal hearing; OROPHARYNX:  normal mucosa, dentition, gingiva, and posterior pharynx;     NECK: range of motion is normal; thyroid is non-palpable; carotid exam is normal with good upstroke and no bruits; supple;     RESPIRATORY: normal respiratory rate and pattern with no distress;     CARDIOVASCULAR: regular rate and rhythm; normal S1, S2; no murmur, rub, or gallop; normal PMI;     GASTROINTESTINAL: nontender; normal bowel sounds;     MUSCULOSKELETAL: gait: in wheel chair-refuses to walk but he can with a walker;     NEUROLOGIC: mental status: oriented to person, place, and time;  GROSSLY INTACT     PSYCHIATRIC: affect/demeanor: flat;  normal psychomotor function; speech pattern: flat;  normal thought and perception;         Assessment:         J47.0   Bronchiectasis with acute lower respiratory infection       I10   Essential (primary) hypertension       R05   Cough       E10.22   Type 1 diabetes mellitus with diabetic chronic kidney disease       J44.1   Chronic obstructive pulmonary disease with (acute) exacerbation           ORDERS:         Meds Prescribed:       [Recorded] clarithromycin 500 mg oral tablet [take 1 tablet (500 mg) by oral route 2 times per day]       [Refilled] clarithromycin 500 mg oral tablet [take 1 tablet (500 mg) by oral route 2 times per day], #20 (twenty) tablets, Refills: 0 (zero)       [Refilled] hydrALAZINE 25 mg oral tablet [take 1 tablet (25 mg)  by oral route 2 times per day with food], #180 (one hundred and eighty) tablets, Refills: 0 (zero)         Radiology/Test Orders:       [id number]  COVID 19 Testing  (Send-Out)            [id number]  COVID 19 Testing  (Send-Out)                      Plan:         Bronchiectasis with acute lower respiratory infectionwill start him on biaxin and test him for covid    LABORATORY:  Labs ordered to be performed today include COVID 19 Testing.      RECOMMENDATIONS given include: Push Fluids, Rest, Follow up if no improvement or worsening symptoms like high fevers, vomiting, weakness, or increasing shortness of air.    .            Prescriptions:       [Recorded] clarithromycin 500 mg oral tablet [take 1 tablet (500 mg) by oral route 2 times per day]       [Refilled] clarithromycin 500 mg oral tablet [take 1 tablet (500 mg) by oral route 2 times per day], #20 (twenty) tablets, Refills: 0 (zero)           Orders:       [id number]  COVID 19 Testing  (Send-Out)              Essential (primary) hypertension          Prescriptions:       [Refilled] hydrALAZINE 25 mg oral tablet [take 1 tablet (25 mg) by oral route 2 times per day with food], #180 (one hundred and eighty) tablets, Refills: 0 (zero)         Cough    LABORATORY:  Labs ordered to be performed today include COVID 19 Testing.            Orders:       [id number]  COVID 19 Testing  (Send-Out)              Type 1 diabetes mellitus with diabetic chronic kidney diseasehe is doing better, BS <200        Chronic obstructive pulmonary disease with (acute) exacerbationtreat as noted above, no steroids needed at this time, cont breo daily and albuterol prn, cont 2 liters O2            Charge Capture:         Primary Diagnosis:     J47.0  Bronchiectasis with acute lower respiratory infection           Orders:      [id number]  Office/outpatient visit; established patient, level 4  (In-House)              I10  Essential (primary) hypertension     R05  Cough     E10.22  Type 1 diabetes mellitus with  diabetic chronic kidney disease     J44.1  Chronic obstructive pulmonary disease with (acute) exacerbation         ADDENDUMS:      ____________________________________    Addendum: 10/16/2020 04:39 PM - Kimmy Riley            ROS: make positive for  dyspnea & - wheezing HKM

## 2021-05-18 NOTE — PROGRESS NOTES
Preston Wallis  1965     Office/Outpatient Visit    Visit Date: Fri, Jey 3, 2020 08:36 am    Provider: Lilo Preciado N.P. (Assistant: Olesya García MA)    Location: [facility]        Electronically signed by Lilo Preciado N.P. on  01/07/2020 09:01:29 AM                             Subjective:        CC: Gómez is a 54 year old White male.  He is here today following a transition of care from an inpatient hospital: [facility]. The patient was admitted on 12-17-19 and discharged on 12-24-19. The patient was admitted for cough with tinge amount of blood and pneumonia. Our office called the patient within 48 hours of discharge and scheduled the follow-up appointment. During the patient's hospital stay the patient was treated by . Medications have been reviewed and reconciled with discharge summary..  PT NOT SURE OF WHAT ALL HE IS AND ISNT TAKING         HPI:           Gómez presents in follow up from hospital admission. He was admitted to the hospital on 12/17/19 - [facility] and discharged on 12/24/19.  He was diagnosed with Community-acquired pneumonia; hemoptysis;  uncontrolled DM;  uncontrolled HTN.  The following lab tests were done: CBC ( on admission WBC 8.4; Hgb 10.8 ), glucose ( 12/17 on admission to  ), HgbA1C ( 11 ).  The following radiology tests were done: chest CT ( angiogram was negative for PE but shows chronic bronchiectasis with bilateral lower lobe infiltrates ), chest x-ray ( 12/17/19 [facility] ).  The patient received the following prescriptions: sent home with omnicef ;  instructed to start amlodipine for BP;  compliance with insulin, IV medications, which included antibiotics, steroids, and DM management with instructions on importance of compliance.  The patient's course has improved.  Reports that he 'forgets' his lantus most nights       Gómez is unsure if he is taking the medicatin that the hospital put him on.  He takes what the pharmacy gives him. He does  not check his BP at home    ROS:     CONSTITUTIONAL:  Positive for fatigue.   Negative for chills or fever.      CARDIOVASCULAR:  Negative for chest pain, orthopnea, paroxysmal nocturnal dyspnea and pedal edema.      RESPIRATORY:  Positive for recent cough, dyspnea ( with moderate exertion ) and frequent wheezing.   Negative for hemoptysis or cough.      GASTROINTESTINAL:  Negative for abdominal pain, heartburn, constipation, diarrhea, and stool changes.      INTEGUMENTARY:  Positive for rash (chronic recurring).      NEUROLOGICAL:  Positive for weakness ( generalized ).          Past Medical History / Family History / Social History:         Last Reviewed on 9/18/2019 08:33 PM by Pawan Teresa    Past Medical History:     Use of high risk medications     MRSA pneumonia     Chronic low back pain     Closed fracture of other tarsal and metatarsal bones     Eczema     Low back pain     Type 2 diabetes     Chronic bronchitis, mucopurulent     Diabetes with neurological manifestations, type II or unspecified type, not stated as uncontrolled     Allergies     Bronchiectasis     COPD     Hypercholesterolemia     Type II DM     Hypertension     GERD     Peripheral neuropathy attributed to type II diabetes     Erectile dysfunction due to organic reasons                 PREVENTIVE HEALTH MAINTENANCE             EYE EXAM: was last done 4/24/19         Surgical History:         Tonsillectomy/Adenoidectomy     L knee;    venous port - L chest;         Family History:         Positive for Coronary Artery Disease ( mother ) and Hypertension ( mother ).      Positive for Cancer- type not specified ( sister ).      Positive for Asthma ( father ).      Positive for Type 2 Diabetes ( mother ).          Social History:     Occupation: Disabled (due to COPD)     Marital Status:      Children: 2 children         Tobacco/Alcohol/Supplements:     Last Reviewed on 12/16/2019 02:38 PM by Cary Verdugo    Tobacco: He has a past  history of cigarette smoking; quit date:  1993.  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 without evidence of necrosis    Major depressive disorder, recurrent, mild    Fall NOS    Unspecified fall, initial encounter    Type 1 diabetes mellitus with diabetic chronic kidney disease    Chronic obstructive pulmonary disease with (acute) exacerbation    Rash and other nonspecific skin eruption    Encounter for follow-up examination after completed treatment for conditions other than malignant neoplasm    Other forms of dyspnea    Other pulmonary embolism without acute cor pulmonale        Immunizations:     influenza, injectable, quadrivalent, preservative free 12/5/2019    zzFluzone pf-quadrivalent 3 and up 10/18/2016    zzFluzone pf-quadrivalent 3 and up 10/16/2017    Fluzone pf (3+ years dose) 9/19/2013    Fluzone Quadrivalent (3+ years) 10/8/2018    Tdap (Tetanus, reduced diph, acellular pertussis) 9/18/2018        Allergies:     Last Reviewed on 12/16/2019 02:38 PM by Cary Verdugo:      Proventil: candidiasis  (Adverse Reaction)        Current Medications:     Last Reviewed on 12/26/2019 12:21 PM by Tara Rosado    Mucinex D     Aspirin Low Dose     Claritin     Glucose Reagent Blood Test Strips  Reagent Strips [Check  blood sugars AC HS. Dispense brand covered by  "insurance.e11.9]    HYDROcodone-acetaminophen  mg oral tablet [One PO BID PRN]    BD Ultra-Fine Mini Pen Needle 31 gauge x 3/16\"  [use w/ trulicity & basaglar]    Omeprazole 40 mg oral capsule,delayed release (enteric coated) [Take 1 capsule(s) by mouth daily]    Loratadine 10 mg oral tablet [Take 1 tablet(s) by mouth daily]    Ventolin HFA 90 mcg/actuation Inhalation HFA Aerosol Inhaler [Inhale 2 puff(s) by mouth 4 times a day as needed]    dilTIAZem HCl 120 mg oral Capsule, Extended Release 24 hr [TAKE 1 CAPSULE BY MOUTH EVERY DAY]    metFORMIN 500 mg oral Tablet, Extended Release 24 hr [TAKE 2 TABLETS BY MOUTH TWICE DAILY]    Breo Ellipta 200mcg/25mcg Inhalation Powder [Take 1 inhalation(s) by mouth daily]    furosemide 40 mg oral tablet [TAKE 1 TABLET BY MOUTH EVERY DAY]    Gabapentin 400 mg oral capsule [1 TAB BID]    hydroCHLOROthiazide 12.5 mg oral capsule [TAKE 1 CAPSULE BY MOUTH TWICE DAILY]    Eliquis 5mg Tablet [take 1 tab BID]    Trulicity PEN 1.5mg/0.5ml Injection [inject 1.5mg once a week]    Cefepime 1 gram IV  BID times 7 days     losartan 50 mg oral tablet [take 1/2 tablet (25 mg) by oral route once daily]    traZODone 150 mg oral tablet [take 1 tablet (150 mg) by oral route qhs]    Cymbalta 60 mg oral capsule,delayed release (enteric coated) [take 1 capsule (60 mg) by oral route once daily]    fluticasone propionate 50 mcg/actuation Intranasal Spray, Suspension [inhale 1 spray (50 mcg) in each nostril by intranasal route 2 times per day]    ipratropium-albuterol 0.5 mg-3 mg(2.5 mg base)/3 mL Inhalation Solution for Nebulization [inhale 3 milliliters by nebulization route 4 times per day as needed]    Lotrisone 1-0.05 % Topical Cream [apply to the affected and surrounding areas of skin by topical route 2 times per day in the morning and evening]    AMLODIPINE 5MG Tablets  [TAKE 1 TABLET BY MOUTH ONCE DAILY]    BASAGLAR INJ 100UNIT Milliliters  [INJECT 40 UNITS UNDER THE SKIN EACH NIGHT AT " BEDTIME]    CEFDINIR 300MG Capsules  [TAKE 1 CAPSULE BY MOUTH TWICE DAILY]        Objective:        Vitals:         Historical:     12/16/2019  BP:   147/63 mm Hg ( (left arm, , sitting, );) 12/5/2019  BP:   141/72 mm Hg ( (left arm, , sitting, );) 11/11/2019  BP:   103/57 mm Hg ( (right arm, , sitting, );)     Current: 1/3/2020 8:44:13 AM    Ht:  5 ft, 9 in;  Wt: 270 lbs (Estimated);  BMI: 39.9T: 98.2 F (oral);  BP: 147/62 mm Hg (left arm, sitting);  P: 89 bpm (left arm (BP Cuff), sitting);  sCr: 1.53 mg/dL;  GFR: 62.77        Exams:     PHYSICAL EXAM:     GENERAL: Vitals recorded well developed, well nourished;  no apparent distress, tired-appearing;     NECK:  supple, full ROM; no thyromegaly; no carotid bruits;     RESPIRATORY: normal respiratory rate and pattern with no distress; decreased breath sounds throughout; expiratory wheezes in the VINCENT and RUL;     CARDIOVASCULAR: normal rate; rhythm is regular;  normal S1; normal S2; no systolic murmur; no cyanosis; no edema;     LYMPHATIC: no enlargement of cervical or facial nodes;     MUSCULOSKELETAL:  Normal range of motion, strength and tone;     NEUROLOGICAL:  cranial nerves, motor and sensory function, reflexes, gait and coordination are all intact;     PSYCHIATRIC:  appropriate affect and demeanor; normal speech pattern; grossly normal memory;         Assessment:         Z13.31   Encounter for screening for depression       J44.1   Chronic obstructive pulmonary disease with (acute) exacerbation       J18.9   Pneumonia, unspecified organism       I10   Essential (primary) hypertension       E10.22   Type 1 diabetes mellitus with diabetic chronic kidney disease       R21   Rash and other nonspecific skin eruption       D64.9   Anemia, unspecified           ORDERS:         Meds Prescribed:       [New Rx] nystatin 100,000 unit/gram Topical Cream [apply to the affected area(s) by topical route 2 times per day], #15 (fifteen) grams, Refills: 0 (zero)         Lab  Orders:       FUTURE  Future order to be done at patients convenience  (Send-Out)            [id number]  [facility] CBC w/o diff  (Send-Out)              Other Orders:         Depression screen positive and follow up plan documented  (In-House)                      Plan:         Encounter for screening for depression    MIPS PHQ-9 Depression Screening: Completed form scanned and in chart; Total Score 10 Positive Depression Screen: feels stable - related to recent illness           Orders:         Depression screen positive and follow up plan documented  (In-House)              Chronic obstructive pulmonary disease with (acute) exacerbationcontinue follow up with PCP week.  continue medication as prescribed        Pneumonia, unspecified organismcontinue taking medication until complete.  continue nebs at home and follow up if no improvement        Essential (primary) hypertensionI have instructed Gómez to always bring his medications with him to his appts.  Discussed that it is difficult to manage medications and chronic conditions if we are not aware of the medicaiton he is taking.  He will have his wife return to the office with his current bottles to ensure that the list we have is accurate and we will determine need for changes based on that updated list        Type 1 diabetes mellitus with diabetic chronic kidney diseasetommy admits to not feeling well when his blood sugar is below 200 so this makes him not want to take his medication.  I have discussed the long term effects of uncontrolled DM and that over time, he will feel better the longer his blood sugar is better controlled.  He will make every attempt to become more compliant.  He will discuss long term management with PCP at office visit next week.          Rash and other nonspecific skin eruption          Prescriptions:       [New Rx] nystatin 100,000 unit/gram Topical Cream [apply to the affected area(s) by topical route 2 times per day], #15  (fifteen) grams, Refills: 0 (zero)         Anemia, unspecifiedrecommend follow up in 3-4 weeks repeat CBC         FOLLOW-UP TESTING #1: FOLLOW-UP LABORATORY:  Labs to be scheduled in the future include CBC without diff.   Patient to schedule to be performed in 4 weeks.            Orders:       FUTURE  Future order to be done at patients convenience  (Send-Out)            [id number]  [address] CBC w/o diff  (Send-Out)                  Patient Recommendations:        For  Anemia, unspecified:            The following laboratory testing has been ordered: Schedule the above testing in 4 weeks.              Charge Capture:         Primary Diagnosis:     Z13.31  Encounter for screening for depression           Orders:      [id number]  Transitional care manage service 14 day discharge  (In-House)              Depression screen positive and follow up plan documented  (In-House)              J44.1  Chronic obstructive pulmonary disease with (acute) exacerbation     J18.9  Pneumonia, unspecified organism     I10  Essential (primary) hypertension     E10.22  Type 1 diabetes mellitus with diabetic chronic kidney disease     R21  Rash and other nonspecific skin eruption     D64.9  Anemia, unspecified

## 2021-05-18 NOTE — PROGRESS NOTES
Preston Wallis WANDER  1965     Office/Outpatient Visit    Visit Date: Wed, May 20, 2020 08:38 am    Provider: Kimmy Riley MD (Assistant: Roopa Contreras LPN)    Location: [facility]        Electronically signed by Kimmy Riley MD on  05/20/2020 11:29:04 AM                             Subjective:        CC: pt has been taking MucinexTommy is a 54 year old White male.  [employer] [phone], 3 month follow up appt.  pain med refills, COPD f/u        HPI:       Gómez is doing great, he is self isolating and his COPD is well controlled, pharmacy was asking for steroid refills so I had to see him today to assess his need, but he is doing great, breathing well on 2 liters O2 NC, walking more and his O2 walking is 98%, he is sleeping well, BM are normal, he is working on wt loss and exercising by walking every day and his breathing is good.  He is taking all of his inhalers as directed.  He uses his CPAP every night.  He has a chronic dry cough that is stable for him.  No fevers.  Allergies are well controlled.  No issues with anxiety or depression at this time.  Home health is still caring for him and I will get labs drawn for his diabetes.  We are also working with home health on getting his port flushed.          With regard to the type 1 diabetes mellitus with diabetic chronic kidney disease, specifically, this is type 1 diabetes, complicated by nephropathy and peripheral neuropathy.  Compliance with treatment has been poor; he skips some insulin doses due to forgetfulness and inconvenience of dosing and does not follow a diet and exercise regimen.      Tobacco screen: Non-smoker.  Current meds include insulin/injectable ( basaglar 35 units nightly, SSI and bydureon weekly ), aspirin, and a lipid lowering agent.  He reports home blood glucose readings have averaged fasting readings in the 2-300 until last 2 days-130 and 120 mg/dL range. He checks his glucose 1 to 2 times daily.   Most recent lab results include Creatinine, Serum:  1.07 (mg/dl) (03/17/2020), Glom Filt Rate, Est:  >60 (ml/min/1.73m2) (03/17/2020), Hematocrit:  32.0 (%) (03/17/2020), Hemoglobin:  10.10 (gm/dl) (03/17/2020), TSH:  0.580 (mIU/L) (03/17/2020), Hemoglobin A1c:  11.8 (02/11/2020), Microalbumin, Urine, rand:  926.0 (mg/L) (11/11/2019), Total Cholesterol:  129 (mg/dL) (11/11/2019), HDL:  37 (mg/dL) (11/11/2019), Triglycerides:  172 (mg/dL) (11/11/2019), LDL:  58 (mg/dL) (11/11/2019).  Has not fallen recently In regard to preventative care, his last ophthalmology exam was in 4/2019.      ROS:     CONSTITUTIONAL:  Negative for chills and fever.      EYES:  Negative for blurred vision and eye pain.      E/N/T:  Positive for nasal congestion.   Negative for ear pain, frequent rhinorrhea or sore throat.      CARDIOVASCULAR:  Negative for chest pain, orthopnea, paroxysmal nocturnal dyspnea and pedal edema.      RESPIRATORY:  Positive for persistent cough ( typically dry ).   Negative for recent cough, dyspnea, hemoptysis or frequent wheezing.      GASTROINTESTINAL:  Negative for abdominal pain, heartburn, constipation, diarrhea, and stool changes.      INTEGUMENTARY:  Negative for rash.      NEUROLOGICAL:  Negative for dizziness and headaches.      PSYCHIATRIC:  Negative for anxiety, depression, and sleep disturbances.          Past Medical History / Family History / Social History:         Last Reviewed on 5/20/2020 09:10 AM by Kimmy Riley    Past Medical History:     Use of high risk medications     MRSA pneumonia     Chronic low back pain     Closed fracture of other tarsal and metatarsal bones     Eczema     Low back pain     Type 2 diabetes     Chronic bronchitis, mucopurulent     Diabetes with neurological manifestations, type II or unspecified type, not stated as uncontrolled     Allergies     Bronchiectasis     COPD     Hypercholesterolemia     Type II DM     Hypertension     GERD     Peripheral neuropathy  "attributed to type II diabetes     Erectile dysfunction due to organic reasons                 PREVENTIVE HEALTH MAINTENANCE             EYE EXAM: was last done 4/24/19         Surgical History:         Cholecystectomy    Tonsillectomy/Adenoidectomy     L knee;    venous port - L chest;         Family History:         Positive for Coronary Artery Disease ( mother ) and Hypertension ( mother ).      Positive for Cancer- type not specified ( sister ).      Positive for Asthma ( father ).      Positive for Type 2 Diabetes ( mother ).          Social History:     Occupation: Disabled (due to COPD)     Marital Status:      Children: 2 children         Tobacco/Alcohol/Supplements:     Last Reviewed on 5/20/2020 08:42 AM by Roopa Contreras    Tobacco: He has a past history of cigarette smoking; quit date:  1993.  Non-drinker         Substance Abuse History:     Last Reviewed on 4/09/2020 12:12 PM by Pawan Teresa        Mental Health History:     Last Reviewed on 4/09/2020 12:12 PM by Pawan Teresa        Communicable Diseases (eg STDs):     Last Reviewed on 4/09/2020 12:12 PM by Pawan Teresa        Allergies:     Last Reviewed on 4/13/2020 10:27 AM by Lorin Lambert    Benadryl:      Proventil: candidiasis  (Adverse Reaction)        Current Medications:     Last Reviewed on 4/13/2020 10:35 AM by Lorin Lambert    aspirin 81 mg oral tablet, delayed release (enteric coated) [take 1 tablet (81 mg) by oral route once daily]    Blood Glucose Test strips  [Check  blood sugars AC HS. Dispense brand covered by insurance.e11.9]    HYDROcodone-acetaminophen  mg oral tablet [One PO BID PRN]    BD Ultra-Fine Mini Pen Needle 31 gauge x 3/16\"  [use w/ trulicity & basaglar]    omeprazole 40 mg oral capsule,delayed release (enteric coated) [TAKE 1 CAPSULE(S) BY MOUTH DAILY]    loratadine 10 mg oral tablet [TAKE 1 TABLET(S) BY MOUTH DAILY]    albuterol sulfate 90 mcg/actuation Inhalation HFA Aerosol Inhaler [INHALE 2 " PUFF(S) BY MOUTH 4 TIMES A DAY AS NEEDED]    dilTIAZem HCl 120 mg oral Capsule, Extended Release 24 hr [TAKE 1 CAPSULE BY MOUTH EVERY DAY]    Bydureon 2 mg/0.65 mL subcutaneous Pen Injector [Inject 2 mg subcutaneously q week]    metFORMIN 500 mg oral Tablet, Extended Release 24 hr [2T PO BID]    Breo Ellipta 200mcg/25mcg Inhalation Powder [Take 1 inhalation(s) by mouth daily]    furosemide 40 mg oral tablet [1T PO QD]    Gabapentin 400 mg oral capsule [1 TAB BID]    Eliquis 5 mg oral tablet [1T PO BID]    losartan 100 mg oral tablet [take 1 tablet (100 mg) by oral route once daily]    traZODone 150 mg oral tablet [take 1 tablet (150 mg) by oral route qhs]    Cymbalta 60 mg oral capsule,delayed release (enteric coated) [take 1 capsule (60 mg) by oral route once daily]    ipratropium-albuterol 0.5 mg-3 mg(2.5 mg base)/3 mL Inhalation Solution for Nebulization [inhale 3 milliliters by nebulization route 4 times per day as needed]    Lotrisone 1-0.05 % Topical Cream [apply to the affected and surrounding areas of skin by topical route 2 times per day in the morning and evening]    BASAGLAR INJ 100UNIT Milliliters  [INJECT 40 UNITS UNDER THE SKIN EACH NIGHT AT BEDTIME]    potassium chloride 10 mEq oral tablet, extended release [1T PO QD]    Zofran 8 mg oral tablet [ODT FORM.  ONE Q SIX HOURS PRN N/V]    metoprolol tartrate 50 mg oral tablet [take 1 tablet (50 mg) by oral route 2 times per day with meals]    calcium citrate 250 mg calcium oral tablet [1 tab PO daily]    Calcitrate 200 mg (950 mg) oral tablet [1T PO QD]    magnesium oxide 400 mg magnesium oral tablet [take one tablet tid]    ferrous sulfate 325 mg (65 mg iron) oral tablet [take 1 tablet (325 mg) by oral route once daily]    lancets  [check blood sugar ac and hs E11.9]    PREDNISONE 50MG Tablets  [TAKE 1 TABLET DAILY]    doxycycline monohydrate 100 mg oral capsule [take 1 capsule (100 mg) by oral route 2 times per day]    blood pressure monitor kit   [patient to monitor b/p twice daily DX I 10]    atorvastatin 20 mg oral tablet [TAKE 1  TABLET PO Q HS]        Objective:        Vitals:         Current: 5/20/2020 8:59:57 AM    Ht:  5 ft, 9 in;  Wt: 286 lbs;  BMI: 42.2sCr: 1.07 mg/dL;  GFR: 91.98        Exams:     PHYSICAL EXAM:     GENERAL: Vitals recorded well developed, well nourished;  well groomed;  no apparent distress;     EYES: PERRL, EOMI     RESPIRATORY: normal respiratory rate and pattern with no distress;     NEUROLOGIC: mental status: oriented to person, place, and time;  GROSSLY INTACT     PSYCHIATRIC:  appropriate affect and demeanor; normal speech pattern; grossly normal memory;         Assessment:         M54.5   Low back pain       G47.33   Obstructive sleep apnea (adult) (pediatric)       E10.22   Type 1 diabetes mellitus with diabetic chronic kidney disease       J44.9   Chronic obstructive pulmonary disease, unspecified       Z79.899   Other long term (current) drug therapy           ORDERS:         Radiology/Test Orders:       3017F  Colorectal CA screen results documented and reviewed (PV)  (In-House)              Lab Orders:       [id number]  Drug test prsmv read direct optical obs pr date  (Send-Out)            35871  DIAB2 - [facility] CMP A1C LIPID AND MICRO ALBUM CR RATIO: [id number]  (Send-Out)              Other Orders:         Depression screen negative  (In-House)            1101F  Pt screen for fall risk; document no falls in past year or only 1 fall w/o injury in past year (MCKENZIE)  (In-House)              Screening mammogram results documented  (Send-Out)                      Plan:         Low back painstable on current pain meds, he is walking more and working on wt loss which is GREAT!!!!  Counseled on diet and exercise    MIPS Negative Depression Screen Telehealth: Verbal consent obtained for visit to occur via televideo conferencing; Staff, other than provider, present during telephone visit include only Dr Bourne  was present during the telehealth OV with patient           Orders:         Depression screen negative  (In-House)            1101F  Pt screen for fall risk; document no falls in past year or only 1 fall w/o injury in past year (MCKENZIE)  (In-House)              Screening mammogram results documented  (Send-Out)            3017F  Colorectal CA screen results documented and reviewed (PV)  (In-House)              Obstructive sleep apnea (adult) (pediatric)cont CPAP-STABLE        Type 1 diabetes mellitus with diabetic chronic kidney diseasedue for labs and eye exam, overall he thinks his BS are doing better    LABORATORY:  Labs ordered to be performed today include Diabetes Panel 2;CMP, A1C, Lipid, Microalbumin:Creatinine Ratio.            Orders:       [id number]  DIAB2 - [facility] CMP A1C LIPID AND MICRO ALBUM CR RATIO: [id number]  (Send-Out)              Chronic obstructive pulmonary disease, unspecifieddoing well, feels good. just needs his port flushed with home health        Other long term (current) drug therapy    christine reviewed, drug screen was unable to be performed  due to COVID pandemic, consent is reviewed and signed and on the chart, pt is aware of risk of addiction on this medication and understands that he will need to follow up for a review every 3 months and his medications will be adjusted or decreased as deemed appropriate at each visit.  No personal history of drug or alcohol abuse.  No concerns about diversion or abuse.  He denies side effects related to the medication.  He is  aware that he may be called in for pill counts.            Orders:       [id number]  Drug test prsmv read direct optical obs pr date  (Send-Out)                  Charge Capture:         Primary Diagnosis:     M54.5  Low back pain           Orders:      [id number]  Office/outpatient visit; established patient, level 4  (In-House)              Depression screen negative  (In-House)            1101F  Pt screen for  fall risk; document no falls in past year or only 1 fall w/o injury in past year (MCKENZIE)  (In-House)            3017F  Colorectal CA screen results documented and reviewed (PV)  (In-House)              G47.33  Obstructive sleep apnea (adult) (pediatric)     E10.22  Type 1 diabetes mellitus with diabetic chronic kidney disease     J44.9  Chronic obstructive pulmonary disease, unspecified     Z79.899  Other long term (current) drug therapy

## 2021-05-28 VITALS
OXYGEN SATURATION: 93 % | DIASTOLIC BLOOD PRESSURE: 43 MMHG | SYSTOLIC BLOOD PRESSURE: 127 MMHG | WEIGHT: 276.5 LBS | RESPIRATION RATE: 20 BRPM | HEART RATE: 80 BPM | HEIGHT: 69 IN | BODY MASS INDEX: 40.95 KG/M2 | TEMPERATURE: 97.9 F

## 2021-05-28 VITALS
SYSTOLIC BLOOD PRESSURE: 138 MMHG | DIASTOLIC BLOOD PRESSURE: 60 MMHG | RESPIRATION RATE: 18 BRPM | TEMPERATURE: 97.1 F | HEART RATE: 79 BPM | OXYGEN SATURATION: 100 % | WEIGHT: 276 LBS | HEIGHT: 69 IN | BODY MASS INDEX: 40.88 KG/M2

## 2021-05-28 NOTE — PROGRESS NOTES
Patient: RICH URIARTE     Acct: [id number]     Report: #[id number]  UNIT #: [id number]     : 1965    Encounter Date:10/27/2020  PRIMARY CARE: BUNNY PUTNAM  ***Signed***  --------------------------------------------------------------------------------------------------------------------  Chief Complaint      Encounter Date      Oct 27, 2020            Primary Care Provider      BUNNY PUTNAM            Referring Provider      BUNNY PUTNAM            Patient Complaint      Patient is complaining of      Sputum Results, COPD            VITALS      Height 5 ft 9 in / 175.26 cm      Weight 276 lbs  / 125.017110 kg      BSA 2.37 m2      BMI 40.8 kg/m2      Temperature 97.1 F / 36.17 C - Tympanic      Pulse 79      Respirations 18      Blood Pressure 138/60 Sitting, Right Arm      Pulse Oximetry 100%, nasal cannula      Initial Exhaled Nitrous Oxide      Date:  Oct 28, 2019            HPI      The patient is a very pleasant 55 year old  male with bronchiectasis     and recurrent pseudomonas pneumonia here for bronchiectasis exacerbation.             I saw the patient last in 2018 and he was supposed to follow up with me but     unfortunately he never has. Since his last office visit he has grown pseudomonas    numerous times, his most recent one is multi-drug resistant. I discussed the     case with his primary care provider and he has a port already and he is about     penitentiary through his 2 week course of Cefepime. He has cough productive of mucoid    sputum, somewhat better since being on Cefepime. He has been on tobramycin     nebulizers in the past and we will need consider putting him on them again. He     is essentially wheelchair bound and needs assistance with activities of daily     living. He gets short of breath walking about 300-400 feet, severe in severity,     worse with exertion, improved with rest. He has nebulizer at home and only takes    them intermittently.  He denies nausea or  vomiting, fever or chills, headaches     and hemoptysis, chest pain, weight loss. His dyspnea is severe in severity worse    with exertion, improved with rest. I did review Dr. JOSELO Singh's note and he has    had a significant bronchiectasis work up all of which is unremarkable. He is     activities of daily living with assistance and denies any swollen glands in head    and neck.             I personally reviewed Review of Systems, family, social, surgical and medical     history and agree with their findings.            ROS      Constitutional:  Complains of: Fatigue; Denies: Fever, Weight gain, Weight loss,    Chills, Insomnia, Other      Respiratory/Breathing:  Complains of: Shortness of air, Wheezing, Cough; Denies:    Hemoptysis, Pleuritic pain, Other      Endocrine:  Denies: Polydipsia, Polyuria, Heat/cold intolerance, Diabetes, Other      Eyes:  Denies: Blurred vision, Vision Changes, Other      Ears, nose, mouth, throat:  Denies: Congestion, Dysphagia, Hearing Changes, Nose    Bleeding, Nasal Discharge, Throat pain, Tinnitus, Other      Cardiovascular:  Denies: Chest Pain, Exertional dyspnea, Peripheral Edema,     Palpitations, Syncope, Wake up Gasping for air, Orthopnea, Tachycardia, Other      Gastrointestinal:  Denies: Abdominal pain/cramping, Bloody stools, Constipation,    Diarrhea, Melena, Nausea, Vomiting, Other      Genitourinary:  Denies: Dysuria, Urinary frequency, Incontinence, Hematuria,     Urgency, Other      Musculoskeletal:  Denies: Joint Pain, Joint Stiffness, Joint Swelling, Myalgias,    Other      Hematologic/lymphatic:  DENIES: Lymphadenopathy, Bruising, Bleeding tendencies,     Other      Neurologic:  Denies: Headache, Numbness, Weakness, Seizures, Other      Psychiatric:  Denies: Anxiety, Appropriate Effect, Depression, Other      Sleep:  No: Excessive daytime sleep, Morning Headache?, Snoring, Insomnia?, Stop    breathing at sleep?, Other      Integumentary:  Denies: Rash, Dry skin,  "Skin Warm to Touch, Other            FAMILY/SOCIAL/MEDICAL HX      Surgical History:  Yes: Abdominal Surgery (hernia surgery 2004),     Cholecystectomy, Head Surgery (cataract ), Oral Surgery (TONSILLECTOMY),     Orthopedic Surgery (LEFT KNEE FRAGMENT REMOVED), Throat Surgery (T AND A), Other    Surgeries; No: Appendectomy, Bladder Surgery, Bowel Surgery, CABG, Vascular     Surgery      Stroke - Family Hx:  Mother, Father      Heart - Family Hx:  Mother, Father, Brother, Sister      Diabetes - Family Hx:  Mother, Sister      Cancer/Type - Family Hx:  Sister      Is Father Still Living?:  No      Is Mother Still Living?:  No       Family History:  Yes      Social History:  No Tobacco Use, No Alcohol Use, No Recreational Drug use      Smoking status:  Former smoker (quit 1995, .5 ppd x 6 years)      Anticoagulation Therapy:  No      Antibiotic Prophylaxis:  No      Medical History:  Yes: Arthritis (knee and right shoulder), Asthma, Cataracts     (SURGERY  BOTH EYES), Chronic Bronchitis/COPD, Depression, Anxiety, Diabetes     (TYPE II INSULIN DEPENDENT), Heart Attack (\"Possible mini heart attack\" last     week), Hemorrhoids/Rectal Prob (ACID REFLUX), High Blood Pressure, Reflux     Disease, Shortness Of Breath; No: Blood Disease, Chemotherapy/Cancer, Congestive    Heart Failu, Deafness or Ringing Ears, Seizures, Sinus Trouble, Miscellaneous     Medical/oth      Psychiatric History      anxiety and depression            PREVENTION      Hx Influenza Vaccination:  Yes      Date Influenza Vaccine Given:  Oct 1, 2020      Influenza Vaccine Declined:  No      2 or More Falls in Past Year?:  No      Fall Past Year with Injury?:  No      Hx Pneumococcal Vaccination:  Yes      Encouraged to follow-up with:  PCP regarding preventative exams.      Chart initiated by      Kelly Contreras CMA            ALLERGIES/MEDICATIONS      Allergies:        Coded Allergies:             DIPHENHYDRAMINE (Verified  Adverse Reaction, Intermediate, " 10/27/20)           ALBUTEROL (Verified  Adverse Reaction, Unknown, ALLERGIC TO BRAND NAME ONLY    (THRUSH); GENERIC IS OK, 10/27/20)                  pt. breaks out in hives if he takes proventil      Uncoded Allergies:             Albuterol (Allergy, Unknown, ALLERGIC TO BRAND NAME ONLY; GENERIC IS OK,     6/21/06)                  pt. breaks out in hives if he takes proventil      Medications    Last Reconciled on 10/27/20 09:16 by NEVILLE SANTORO MD      NEB-Albuterol Sulf (Albuterol) 2.5 Mg/0.5 Ml Vial.neb      2.5 MG INH Q4H PRN for SHORTNESS OF BREATH, #120 NEB 0 Refills         Prov: NEVILLE SANTORO         10/27/20       DULoxetine (Cymbalta) Unknown Strength Capsule.dr      PO BID, #60 CAP 0 Refills         Reported         10/27/20       Aspirin Chew (Aspirin Baby) 81 Mg Tab.chew      81 MG PO QDAY, #30 TAB.CHEW 0 Refills         Reported         1/28/19       MDI-Albuterol (Ventolin HFA) 18 Gm Hfa.aer.ad      2 PUFFS INH RTQID, #1 MDI 0 Refills         Reported         1/28/19       guaiFENesin Granules (Mucinex Mini-Melts) 100 Mg Gran.pack      200 MG PO BID, PKT         Reported         1/28/19       Atorvastatin (Atorvastatin) 20 Mg Tablet      20 MG PO QDAY         Reported         1/28/19       Hctz (hydroCHLOROthiazide) 12.5 Mg Capsule      12.5 MG PO QDAY         Reported         1/28/19       dilTIAZem LA (Cardizem LA) 120 Mg Tab.sr.24h      120 MG PO QDAY         Reported         1/28/19       (Bydureon Pen Inj 2MG) 1 PEN.INJCTR No Conflict Check      1 PEN.INJCTR SUBQ WEEKLY         Reported         1/28/19       Omeprazole (Omeprazole*) 40 Mg Capsule      40 MG PO QDAY         Reported         1/28/19       Losartan Potassium (Losartan*) 50 Mg Tablet      50 MG PO BID         Reported         1/28/19       Bumetanide (BUMETANIDE) 2 Mg Tablet      2 MG PO BID         Reported         1/28/19       Metoprolol Tartrate (Metoprolol Tartrate) 50 Mg Tablet      50 MG PO BID         Reported          19       hydrALAZINE HCL (hydrALAZINE HCL) 25 Mg Tablet      25 MG PO TID         Reported         19      Current Medications      Current Medications Reviewed 10/27/20            EXAM      Vital Signs Reviewed      Gen: WDWN, Alert, NAD.        HEENT:  PERRL, EOMI.  OP, nares clear, no sinus tenderness.      Neck:  Supple, no JVD, no thyromegaly.      Chest:  Good aeration, coarse crackles and rhonchi throughout all lung fields,      tympanic to percussion bilaterally, no work of breathing noted.      CV:  RRR, no MGR, pulses 2+, equal.      Abd:  Soft, NT, ND, + BS, no HSM. Obese.        EXT:  No clubbing, no cyanosis, trace bilateral lower extremity edema, no joint     tenderness.       Neuro:  A  Skin: No rashes or lesions.      Vitals      Vitals:             Height 5 ft 9 in / 175.26 cm           Weight 276 lbs  / 125.021361 kg           BSA 2.37 m2           BMI 40.8 kg/m2           Temperature 97.1 F / 36.17 C - Tympanic           Pulse 79           Respirations 18           Blood Pressure 138/60 Sitting, Right Arm           Pulse Oximetry 100%, nasal cannula            REVIEW      Results Reviewed      PCCS Results Reviewed?:  Yes Prev Lab Results, Yes Prev Radiology Results, Yes     Previous Mecial Records      Lab Results      I personally reviewed my last office note and Dr. JOSELO Singh's notes. I     personally reviewed alpha 1 antitrypsin testing which was unremarkable. Sputum     cultures grew multi-drug resistant pseudomonas.      Radiographic Results               [facility]                PACS RADIOLOGY REPORT            Patient: RICH URIARTE   Acct: #[id number]   Report: #[id number]            UNIT #: [id number]    DOS: 10/13/20 1514      INSURANCE:[employer] [facility]   ORDER #:[id number]      LOCATION:[facility]     : 1965            PROVIDERS      ADMITTING:     ATTENDING: BUNNY PUTNAM      FAMILY:  NONE,MD    ORDERING:  BUNNY PUTNAM         OTHER:    DICTATING:  MAHNAZ GREER MD            REQ #:[id number]   EXAM:CXR2 - CHEST 2V AP PA LAT      REASON FOR EXAM:  COPD      REASON FOR VISIT:  J44.1            *******Signed******         PROCEDURE:   CHEST AP/PA AND LATERAL             COMPARISON:   [facility], CT, CHEST W/ CONTRAST, 2020, 12:01.     [facility], CR, CHEST PA/AP          INDICATIONS:   INCREASED SHORT OF BREATH X 3 DAYS             FINDINGS:         There is a left subclavian Gqophy-S-Cgzf catheter with the tip in the superior     vena cava.  The       heart size and pulmonary vessels are normal.  There are chronic interstitial     changes in the upper       lobes bilaterally with decrease in the amount of confluent opacities associated     likely with the       fluid seen within bronchi and cystic bronchiectasis on prior exam.  The lung     bases are clear.  The       osseous structures are normal.             CONCLUSION:         1. Bilateral upper lobe chronic interstitial disease with slight decrease in the    previously noted       more confluent densities likely representing decrease in the fluid within the     dilated is cystic       bronchiectasis of the apices.              MAHNAZ GREER MD             Electronically Signed and Approved By: MAHNAZ GREER MD on 10/13/2020 at 15:47                               Until signed, this is an unconfirmed preliminary report that may contain      errors and is subject to change.                                              PILAR:      D:10/13/20 1547                     [facility]                PACS RADIOLOGY REPORT            Patient: RICH URIARTE   Acct: #[id number]   Report: #[id number]            UNIT #: [id number]    DOS: 20 1118      INSURANCE:[employer]   ORDER #:[id number]      LOCATION:ER     : 1965            PROVIDERS       ADMITTING:     ATTENDING:       FAMILY:  BUNNY PUTNAM   ORDERING:  DEVANTE BURK         OTHER:    DICTATING:  ORIANA WHITLEY MD            REQ #:[id number]   EXAM:CHW - CT CHEST with CONTRAST      REASON FOR EXAM:  Shortness of Breath      REASON FOR VISIT:  ABNORMAL LABS            *******Signed******         PROCEDURE:   CT CHEST W/ CONTRAST             COMPARISON:   [facility], CT, CHEST W/O CONTRAST, 11/06/2019,     12:08.             INDICATIONS:   Shortness of Breath             TECHNIQUE:   After obtaining the patient's consent, CT images were obtained with    non-ionic       intravenous contrast material.               PROTOCOL:     Pulmonary embolism imaging protocol performed                RADIATION:     DLP: 622.7mGy*cm          Automated exposure control was utilized to minimize radiation dose.       CONTRAST:   100cc Isovue 370 I.V.             FINDINGS:         The visualized soft tissue structures at the base and neck including the thyroid    appear within       normal limits for there is no lower cervical or axillary adenopathy.  The heart     size is normal.        There is no pericardial effusion.  The aorta is normal in caliber without     evidence of aneurysm       formation.  The main pulmonary artery is normal in caliber.  There are no     filling defects within       the pulmonary arterial system to suggest presence of underlying pulmonary e    mbolism.  There are       enlarged bilateral hilar lymph nodes which appear similar in size to the prior     examination, likely       reactive to underlying pulmonary disease.               The trachea and mainstem bronchi are patent.  There is abnormal sludge ago     bronchiectasis       predominantly involving the posterior left upper lobe and medial left lower lobe    as well as the       central portion of the lingula on the left and right upper, right middle, and     right lower lobe       centrally on the right.  There are  areas of surrounding tree-in-bud nodularity     as well as bronchial       wall thickening distally likely related to bronchiolitis and endobronchial     spread of infection.        This appears to be a chronic ongoing process.  There is no pleural effusion or     pneumothorax.             There is a tiny sliding type hiatal hernia.  Visualized portions of the upper     abdomen demonstrate       postcholecystectomy changes.  There are multilevel degenerative changes of the     thoracic spine.  No       suspicious lytic or sclerotic osseous lesion.             CONCLUSION:         1. No evidence of pulmonary embolism.      2. Bilateral central bronchiectasis with interstitial thickening and surrounding    tree-in-bud       nodularity.  This likely represents acute on chronic bronchitis/bronchiolitis     with endobronchial       spread of infection.  Potential etiologies would include aspiration or chronic     atypical       mycobacterium infection.                ORIANA WHITLEY MD             Electronically Signed and Approved By: ORIANA WHITLEY MD on 9/30/2020 at 12:22                                  Until signed, this is an unconfirmed preliminary report that may contain      errors and is subject to change.                                              BATB1:      D:09/30/20 1222            Assessment      Notes      New Medications      * DULoxetine (Cymbalta) Unknown Strength CAPSULE.DR: PO BID #60      Renewed Medications      * NEB-Albuterol Sulf (Albuterol) 2.5 MG/0.5 ML VIAL.NEB: 2.5 MG INH Q4H PRN       SHORTNESS OF BREATH #120         Instructions: DIAGNOSIS CODE REQUIRED PRIOR TO PRESCRIBING.      Discontinued Medications      * TIOTROPIUM BROMIDE (Spiriva Respimat 2.5 mcg/Puff) 4 GM MIST.INHAL: 2 PUFFS       INH QDAY #1      * Fluticasone/Vilanterol 200-25 Mcg Inh (Breo Ellipta 200-25 Mcg Inh) 1 EACH       BLST.W.DEV: 1 PUFF INH QDAY 30 Days #1      * Fluticasone/Vilanterol 200-25 Mcg Inh (Breo  Ellipta 200-25 Mcg Inh) 1 EACH       BLST.W.DEV: 1 PUFF INH QDAY 30 Days #1      ASSESSMENT:      1. Bronchiectasis with exacerbation.       2. Recurrent pseudomonas pneumonia and bronchopneumonia with multi-drug     resistant pseudomonas.       3. Chronic cough.       4. Chronic dyspnea.       5. Chronic wheezing.       6. History of PE in July 2019 on Xarelto without cor pulmonale.       7. Morbid obesity with BMI 40.8.      8. Very severe chronic obstructive pulmonary disease, FEV1 21% in 2008     noncompliant with inhalers. Chronic obstructive pulmonary disease assessment     test score is 35 today.             PLAN:      1. Complete 14 days of Cefepime via port for recurrent pseudomonas MDR,     bronchopneumonia.      2. Start tobramycin nebulizers. We will cycle off and on for 4 weeks intervals     for 6 months and reassess symptoms.      3. Patient refuses steroids.       4. I recommend the patient take DuoNeb up to 4 times a day and take them     scheduled at least 2-3 times a day to help improve his airway clearance. The     patient refuses flutter valve and controller medications for chronic obstructive    pulmonary disease.       5.  I spent 4 minutes discussing diet and exercise counseling. I recommend 30     minutes of daily exercise as well as 1800 calorie low fat diet.      6. Diuretics per primary care provider.       7. Continue anticoagulation.      8. The patient refuses vaccinations for Prevnar and Pneumovax. He is up to date     with flu vaccine.       9. Continue supplemental oxygen to keep oxygen saturation at or above 89%.      10. Follow up with us in 6-8 weeks to reassess symptoms.            Patient Education      ACO BMI High above 25:  Counseling Given, Encouraged weight loss, Encourage     dietary changes      Patient Education Provided:  Acute Respiratory Syndrom            Electronically signed by NEVILLE SANTORO  10/29/2020 13:05       Disclaimer: Converted document may not contain  table formatting or lab diagrams. Please see [employer] for the authenticated document.

## 2021-05-28 NOTE — PROGRESS NOTES
Patient: PRESTON WALLIS     Acct: [id number]     Report: #[id number]  UNIT #: [id number]     : 1965    Encounter Date:2020  PRIMARY CARE: BUNNY PUTNAM  ***Signed***  --------------------------------------------------------------------------------------------------------------------  History of Present Illness      Chief Complaint: F/U, Bronchiectasis, Pneumonia, COPD            Preston Wallis is presenting for evaluation via Telehealth visit. Verbal consent    obtained before beginning visit.            PAST MEDICAL HISTORY/OVERVIEW OF PATIENT SYMPTOMS            Symptoms: Cough, Wheezing, Soa            Any known Exposure to COVID-19:No            Former smoker (quit , .5 ppd x 6 years)            Provider spent 16 minutes with the patient during telehealth visit.            The following staff were present during this visit: Demetrice Johnston CMA, Betzaida ANDUJAR             The patient is a 55 year old male patient of Dr. Carolina's with bronchiectasis     and recurrent pseudomonas pneumonia  who presents for Telehealth visit today.     The patient was started on tobramycin nebulizers at the last visit and he is     doing 4 weeks on and 4 weeks off. The patient states his symptoms have improved     since starting tobramycin nebulizers however he still has some coughing and     wheezing at night when he lies down. The patient states his sputum is thick and     clear to yellow. The patient states he does get short of breath that is moderate    in severity, worse with exertion, improved with rest. The patient has severe     chronic obstructive pulmonary disease however he is not on any controller     medicines. The patient states he has flutter valve at home that he uses twice     daily along with DuoNeb. The patient denies any fever or chills, night sweats,     hemoptysis, swollen glands in head and neck, unintentional weight loss, chest     pain or chest tightness, abdominal pain, nausea  or vomiting or diarrhea. The     patient admits to having reflux and is taking omeprazole. The patient denies      any headaches, myalgias, sore throat, changes in sense of taste and smell any     coronavirus or flu like symptoms.  The patient states he finished his 2 week     course of Cefepime. The patient states his cough is productive of mucosputum     however it is improved with tobramycin nebulizers. The patient states he is     getting ready to restart his tobramycin nebulizers in about 1.5 weeks.  The     patient reports he did have a hospital stay at [facility] when he had a seizure when    his blood sugars were over 1200 and is following up with his PCP.   The patient     states he is able to perform his activities of daily living.            I reviewed the Review of Systems, medical, surgical and family history and agree    with those as entered.               Physical exam is deferred due to Telehealth visit.            I personally reviewed Dr. Carolina's last office note.                    Allergies and Medications      Allergies:        Coded Allergies:             DIPHENHYDRAMINE (Verified  Adverse Reaction, Intermediate, 12/14/20)           ALBUTEROL (Verified  Adverse Reaction, Unknown, ALLERGIC TO BRAND NAME ONLY    (THRUSH); GENERIC IS OK, 12/14/20)                  pt. breaks out in hives if he takes proventil      Uncoded Allergies:             Albuterol (Allergy, Unknown, ALLERGIC TO BRAND NAME ONLY; GENERIC IS OK,     6/21/06)                  pt. breaks out in hives if he takes proventil      Medications    Last Reconciled on 12/14/20 13:51 by MIGUEL ÁNGEL VERONICA       Tiotropium Br/Olodaterol HCl (Stiolto Respimat Inhal Spray) 4 Gm Mist.inhal      2 PUFFS INH QDAY, #1 INH 5 Refills         Prov: MIGUEL ÁNGEL VERONICA [facility]         12/14/20       Neb-Tobramycin (Jt 300 mg/5 ml Solution) 300 Mg/5 Ml Ampul.neb      300 MG INH RTQ12H, NEB         Reported         12/14/20       NEB-Albuterol Sulf  (Albuterol) 2.5 Mg/0.5 Ml Vial.neb      2.5 MG INH Q4H PRN for SHORTNESS OF BREATH, #120 NEB 0 Refills         Prov: NEVILLE SANTORO         10/27/20       DULoxetine (Cymbalta) Unknown Strength Capsule.dr      PO BID, #60 CAP 0 Refills         Reported         10/27/20       Aspirin Chew (Aspirin Baby) 81 Mg Tab.chew      81 MG PO QDAY, #30 TAB.CHEW 0 Refills         Reported         1/28/19       MDI-Albuterol (Ventolin HFA) 18 Gm Hfa.aer.ad      2 PUFFS INH RTQID, #1 MDI 0 Refills         Reported         1/28/19       guaiFENesin Granules (Mucinex Mini-Melts) 100 Mg Gran.pack      200 MG PO BID, PKT         Reported         1/28/19       Atorvastatin (Atorvastatin) 20 Mg Tablet      20 MG PO QDAY         Reported         1/28/19       Hctz (hydroCHLOROthiazide) 12.5 Mg Capsule      12.5 MG PO QDAY         Reported         1/28/19       dilTIAZem LA (Cardizem LA) 120 Mg Tab.sr.24h      120 MG PO QDAY         Reported         1/28/19       (Bydureon Pen Inj 2MG) 1 PEN.INJCTR No Conflict Check      1 PEN.INJCTR SUBQ WEEKLY         Reported         1/28/19       Omeprazole (Omeprazole*) 40 Mg Capsule      40 MG PO QDAY         Reported         1/28/19       Losartan Potassium (Losartan*) 50 Mg Tablet      50 MG PO BID         Reported         1/28/19       Bumetanide (BUMETANIDE) 2 Mg Tablet      2 MG PO BID         Reported         1/28/19       Metoprolol Tartrate (Metoprolol Tartrate) 50 Mg Tablet      50 MG PO BID         Reported         1/28/19       hydrALAZINE HCL (hydrALAZINE HCL) 25 Mg Tablet      25 MG PO TID         Reported         1/28/19            Assessment      Cough R05, Shortness of Air  R06.02            Plan      Orders:  Phone Eval 11-20 mi [phone]      Instructions      * Chronic conditions reviewed and taken in consideration for today's treatment       plan.      * Plan Of Care: ()      * Patient instructed to seek medical attention urgently for new or worsening       symptoms.      * Patient  was educated/instructed on their diagnosis, treatment and medications       today.      * Recommend self monitoring. Instructions given.      * Recommend self quarantine for 14 days.      * Recommend self quarantine until without fever for 72 hours without using fever       reducing medications.      * Recommends over the counter medications for symptom management.            ASSESSMENT:      1. Bronchiectasis.       2. Recurrent pseudomonas pneumonia and bronchopneumonia with multidrug resistant     pseudomonas, the patient on tobramycin nebulizers 4 weeks on and 4 weeks off.       3. Chronic cough.       4. Chronic dyspnea.       5. Chronic wheezing.       6. Gastroesophageal reflux disease.        7. Type II diabetes.        8. History of PE in July 2019 on Xarelto without cor pulmonale.       9. Very severe chronic obstructive pulmonary disease with FEV1 of 21% in 2008     noncompliant with inhalers.       10. Obstructive sleep apnea noncompliant with BiPAP.       11. Tobacco abuse of cigarettes in remission.               PLAN:      1. Continue tobramycin nebulizers 4 weeks on and 4 weeks off.       2. I will start the patient on Stiolto 2 puffs once a day.       3. Continue DuoNeb and albuterol as needed.       4. The patient is advised to continue flutter valve twice daily along with     DuoNeb to assist with airway clearance.       5. Diuretics per primary care provider.        6. The patient is advised to follow up with his primary care provider in regards     to management of his type II diabetes.        7. Continue with PPI as prescribed, sleep with the head of the bed elevated and     not eat 3-4 hours prior to bedtime.       8. Continue anticoagulation.       9. The patient reports he is up to date with his flu and pneumonia vaccines.       10. Continue supplemental oxygen to keep oxygen saturation at or above 89%.      11. The patient is advised to call the office, call 911 or go to the ER for any      new or worsening symptoms.       12. Follow up with Dr. Carolina in 3-4 weeks sooner if needed.            Electronically signed by MIGUEL ÁNGEL VERONICA [facility]  12/17/2020 16:25       Disclaimer: Converted document may not contain table formatting or lab diagrams. Please see [employer] [facility] for the authenticated document.

## 2021-05-28 NOTE — PROGRESS NOTES
Patient: RICH URIARTE     Acct: [id number]     Report: #[id number]  UNIT #: [id number]     : 1965    Encounter Date:10/28/2019  PRIMARY CARE: BUNNY PUTNAM  ***Signed***  --------------------------------------------------------------------------------------------------------------------  Chief Complaint      Encounter Date      Oct 28, 2019            Primary Care Provider      BUNNY PUTNAM            Referring Provider      BUNNY PUTNAM            Patient Complaint      Patient is complaining of      abnormal sputum results            VITALS      Height 5 ft 9.00 in / 175.26 cm      Weight 276 lbs 8.000 oz / 125.888285 kg      BSA 2.37 m2      BMI 40.8 kg/m2      Temperature 97.9 F / 36.61 C - Oral      Pulse 80      Respirations 20      Blood Pressure 127/43 Sitting, Left Arm      Pulse Oximetry 93%, nasal cannula, 2.0 lpm      Initial Exhaled Nitrous Oxide      Date:  Oct 28, 2019      Exhaled Nitrous Oxide Results:  6            HPI      The patient is a 54 year old morbidly obese  male former cigarette     smoker with interstitial lung disease, possible cystic lung disease and COPD     here for a second opinion.  He had previously seen Dr. JOSELO Singh, but wants to     see us because we are closer. He is not sure what inhaler he is on, but after     further questioning, it appears that he takes Dulera with albuterol.  He has a     history of pulmonary embolism two months after after being hospitalized at     [facility]. He had MRSA pneumonia and was also on a ventilator. He is at     baseline now, wearing two liters of oxygen since his hospitalization. He gets     short of breath walking about 100-200 feet, severe in severity, worse with     exertion and relieved with rest.  He wheezes nonstop and has chronically wheezed    for years. Inhalers do not help. He has a dry cough with no sputum production or    hemoptysis. He denies any headaches, leg swelling, orthopnea or PND. He denies      any nausea, vomiting, fevers, chills, headaches, chest pain or hemoptysis. He q    uit smoking about 4-5 years ago. Labs in 09/2019 show 160 peripheral eosinophils    and no evidence of chronic hypercapnic respiratory failure.  He is able to     perform ADLs without difficulty.  Denies any swollen glands or lymph nodes of     the head and neck.              I have personally reviewed the review of systems, past family, social, surgical     and medical histories and I agree with the findings.            ROS      Constitutional:  Denies: Fatigue, Fever, Weight gain, Weight loss, Chills,     Insomnia, Other      Respiratory/Breathing:  Complains of: Shortness of air, Wheezing, Cough; Denies:    Hemoptysis, Pleuritic pain, Other      Endocrine:  Denies: Polydipsia, Polyuria, Heat/cold intolerance, Diabetes, Other      Eyes:  Denies: Blurred vision, Vision Changes, Other      Ears, nose, mouth, throat:  Denies: Mouth lesions, Thrush, Throat pain,     Hoarseness, Allergies/Hay Fever, Post Nasal Drip, Headaches, Recent Head Injury,    Nose Bleeding, Neck Stiffness, Thyroid Mass, Hearing Loss, Ear Fullness, Dry     Mouth, Nasal or Sinus Pain, Dry Lips, Nasal discharge, Nasal congestion, Other      Cardiovascular:  Denies: Palpitations, Syncope, Claudication, Chest Pain, Wake     up Gasping for air, Leg Swelling, Irregular Heart Rate, Cyanosis, Dyspnea on     Exertion, Other      Gastrointestinal:  Denies: Nausea, Constipation, Diarrhea, Abdominal pain,     Vomiting, Difficulty Swallowing, Reflux/Heartburn, Dysphagia, Jaundice,     Bloating, Melena, Bloody stools, Other      Genitourinary:  Denies: Urinary frequency, Incontinence, Hematuria, Urgency,     Nocturia, Dysuria, Testicular problems, Other      Musculoskeletal:  Denies: Joint Pain, Joint Stiffness, Joint Swelling, Myalgias,    Other      Hematologic/lymphatic:  DENIES: Lymphadenopathy, Bruising, Bleeding tendencies,     Other      Neurological:  Denies: Headache,  "Numbness, Weakness, Seizures, Other      Psychiatric:  Denies: Anxiety, Appropriate Effect, Depression, Other      Sleep:  No: Excessive daytime sleep, Morning Headache?, Snoring, Insomnia?, Stop    breathing at sleep?, Other      Integumentary:  Denies: Rash, Dry skin, Skin Warm to Touch, Other      Immunologic/Allergic:  Denies: Latex allergy, Seasonal allergies, Asthma,     Urticaria, Eczema, Other      Immunization status:  No: Up to date            FAMILY/SOCIAL/MEDICAL HX      Surgical History:  Yes: Abdominal Surgery (hernia surgery 2004),     Cholecystectomy, Head Surgery (cataract ), Oral Surgery (TONSILLECTOMY),     Orthopedic Surgery (LEFT KNEE FRAGMENT REMOVED), Throat Surgery (T AND A), Other    Surgeries; No: Appendectomy, Bladder Surgery, Bowel Surgery, CABG, Vascular     Surgery      Stroke - Family Hx:  Mother, Father      Heart - Family Hx:  Mother, Father, Brother, Sister      Diabetes - Family Hx:  Mother, Sister (x2)      Cancer/Type - Family Hx:  Sister      Is Father Still Living?:  No      Is Mother Still Living?:  No       Family History:  Yes      Social History:  No Tobacco Use, No Alcohol Use, No Recreational Drug use      Smoking status:  Former smoker (.5 ppd x 20y quit 1990s)      Medical History:  Yes: Arthritis (knee and right shoulder), Asthma, Cataracts     (SURGERY  BOTH EYES), Chronic Bronchitis/COPD, Diabetes (TYPE II INSULIN     DEPENDENT), Heart Attack (\"Possible mini heart attack\" last week),     Hemorrhoids/Rectal Prob (ACID REFLUX), High Blood Pressure, Reflux Disease,     Shortness Of Breath; No: Blood Disease, Chemotherapy/Cancer, Congestive Heart     Failu, Deafness or Ringing Ears, Seizures, Miscellaneous Medical/oth      Psychiatric History      none            PREVENTION      Hx Influenza Vaccination:  Yes      Date Influenza Vaccine Given:  Sep 1, 2018      2 or More Falls Past Year?:  No      Fall Past Year with Injury?:  No      Hx Pneumococcal Vaccination:  No    "   Encouraged to follow-up with:  PCP regarding preventative exams.      Chart initiated by      bea milner/ ma            ALLERGIES/MEDICATIONS      Allergies:        Coded Allergies:             DIPHENHYDRAMINE (Verified  Adverse Reaction, Intermediate, 10/28/19)           ALBUTEROL (Verified  Adverse Reaction, Unknown, ALLERGIC TO BRAND NAME ONLY    (THRUSH); GENERIC IS OK, 10/28/19)                  pt. breaks out in hives if he takes proventil      Uncoded Allergies:             Albuterol (Allergy, Unknown, ALLERGIC TO BRAND NAME ONLY; GENERIC IS OK,     6/21/06)                  pt. breaks out in hives if he takes proventil      Medications    Last Reconciled on 10/28/19 10:08 by NEVILLE SANTORO MD      Budesonide/Formoterol Fumarate (Symbicort 160/4.5 Mcg) 10.2 Gm Inh      2 PUFF INH BID, #1 INH 5 Refills         Prov: [facility]         10/28/19       Tiotropium Bromide (Spiriva Respimat 2.5 mcg/Puff) 4 Gm Mist.inhal      2 PUFFS INH QDAY, #1 MDI 5 Refills         Prov: MIGUEL [facility]NGEL VERONICA [facility]         10/28/19       Furosemide* (Lasix*) 40 Mg Tablet      40 MG PO QDAY, #30 TAB 0 Refills         Reported         10/28/19       Aspirin Chew (Aspirin Baby) 81 Mg Tab.chew      81 MG PO QDAY, #30 TAB.CHEW 0 Refills         Reported         1/28/19       NEB-Albuterol Sulf (Albuterol) 2.5 Mg/0.5 Ml Vial.neb      2.5 MG INH Q4H PRN for SHORTNESS OF BREATH, #120 NEB 0 Refills         Reported         1/28/19       MDI-Albuterol (Ventolin HFA) 18 Gm Hfa.aer.ad      2 PUFFS INH RTQID, #1 MDI 0 Refills         Reported         1/28/19       Loratadine (Claritin) 5 Mg/5 Ml Solution      10 MG PO QDAY, ML         Reported         1/28/19       Guaifenesin (Mucinex*) 100 Mg Gran.pack      200 MG PO BID, PKT         Reported         1/28/19       Atorvastatin (Atorvastatin) 20 Mg Tablet      20 MG PO QDAY         Reported         1/28/19       Metformin HCl (Fortamet) 1,000 Mg Tab.er.24      1000 MG PO BID          Reported         1/28/19       Hydrochlorothiazide (Hydrochlorothiazide*) 12.5 Mg Capsule      12.5 MG PO QDAY         Reported         1/28/19       Diltiazem LA (Cardizem LA) 120 Mg Tab.sr.24h      120 MG PO QDAY         Reported         1/28/19       (Bydureon Pen Inj 2MG) 1 PEN.INJCTR No Conflict Check      1 PEN.INJCTR SUBQ WEEKLY         Reported         1/28/19       Acetaminophen/Hydrocodone 10/325 (Hydrocodone/Acetaminophen 10/325) 1 Each     Tablet      1 TAB PO BID         Reported         1/28/19       Omeprazole (Omeprazole*) 40 Mg Capsule      40 MG PO QDAY         Reported         1/28/19       Losartan Potassium (Losartan*) 50 Mg Tablet      50 MG PO BID         Reported         1/28/19       Bumetanide (BUMETANIDE) 2 Mg Tablet      2 MG PO BID         Reported         1/28/19       Metoprolol Tartrate (Metoprolol Tartrate) 50 Mg Tablet      50 MG PO BID         Reported         1/28/19       hydrALAZINE HCl (hydrALAZINE HCl) 25 Mg Tablet      25 MG PO TID         Reported         1/28/19      Current Medications      Current Medications Reviewed 10/28/19            EXAM      Vital Signs Reviewed.      General:  Wheelchair bound, WDWN, Alert, NAD.      HEENT: PERRL, EOMI.  OP, nares clear, no sinus tenderness.      Neck: Supple, no JVD, no thyromegaly.      Lymph: No axillary, cervical, supraclavicular lymphadenopathy noted bilaterally.      Chest: Barrel chested, coarse wheezing and rhonchi through all lung fields,     tympanic to percussion bilaterally, no work of breathing noted.        CV: RRR, no MGR, pulses 2+, equal.        Abd: Obese, soft, NT, ND, +BS, no HSM.      EXT: No clubbing, no cyanosis, no edema, no joint tenderness.        Neuro:  A  Skin: No rashes or lesions.      Vtials      Vitals:             Height 5 ft 9.00 in / 175.26 cm           Weight 276 lbs 8.000 oz / 125.570238 kg           BSA 2.37 m2           BMI 40.8 kg/m2           Temperature 97.9 F / 36.61 C - Oral            Pulse 80           Respirations 20           Blood Pressure 127/43 Sitting, Left Arm           Pulse Oximetry 93%, nasal cannula, 2.0 lpm            REVIEW      Results Reviewed      PCCS Results Reviewed?:  Yes Prev Lab Results, Yes Prev Radiology Results, Yes     Previous [facility] Records      Lab Results      I reviewed office notes from referring provider. I reviewed labs showing no     peripheral eosinophilia and no evidence of chronic hypercapnic respiratory     failure. I personally reviewed a chest x-ray from 2019.  I also reviewed his     hospital records from a hospital records from a hospitalization done in 2019.     I also personally reviewed a pulmonary function test from  showing an FEV1     of 21% of predicted at that time.      Radiographic Results               [facility]                PACS RADIOLOGY REPORT            Patient: RICH URIARTE   Acct: #[id number]   Report: #[id number]            UNIT #: [id number]    DOS: 19 1601      INSURANCE:[employer]   ORDER #:[id number]      LOCATION:[facility]     : 1965            PROVIDERS      ADMITTING:     ATTENDING: OSVALDO PORTILLO      FAMILY:  BUNNY PUTNAM   ORDERING:  OSVALDO PORTILLO         OTHER:    DICTATING:  CATIE LEMUS MD            REQ #:[id number]   EXAM:CXR2 - CHEST 2V AP PA LAT      REASON FOR EXAM:  V      REASON FOR VISIT:  RIB PAIN            *******Signed******         PROCEDURE:   CHEST AP/PA AND LATERAL             COMPARISON:   [facility] , CHEST PA/AP   12:32.             INDICATIONS:   CHEST XRAYS DUE TO POST FALL ON 2019.             FINDINGS:         Heart size unchanged.  Chronic appearing parenchymal change in both lungs is     stable.  Left-sided       chest port is stable.  No new dense consolidation.  No pleural fluid or     pneumothorax.             CONCLUSION:   No acute change from 9/3/2019               CATIE LEMUS MD             Electronically Signed and Approved By: CATIE LEMUS MD on 9/18/2019 at 16:30                        Until signed, this is an unconfirmed preliminary report that may contain      errors and is subject to change.                                              CAROLINE:      D:09/18/19 1630            Assessment      Dyspnea         Dyspnea on exertion - R06.09         Dyspnea type: dyspnea on exertion            Wheezing - R06.2            Pulmonary embolus         Other acute pulmonary embolism without acute cor pulmonale - I26.99         Pulmonary embolism type: other         Chronicity: acute         Acute cor pulmonale presence: without acute cor pulmonale            Pneumonia         Pneumonia of both lungs due to methicillin resistant Staphylococcus aureus        (MRSA), unspecified part of lung - J15.212         Pneumonia type: due to methicillin-resistant Staphylococcus aureus (MRSA)         Laterality: bilateral         Lung location: unspecified part of lung            Notes      New Medications      * Furosemide* (Lasix*) 40 MG TABLET: 40 MG PO QDAY #30      * TIOTROPIUM BROMIDE (Spiriva Respimat 2.5 mcg/Puff) 4 GM MIST.INHAL: 2 PUFFS       INH QDAY #1      * Budesonide/Formoterol Fumarate (Symbicort 160/4.5 Mcg) 10.2 GM INH: 2 PUFF INH      BID #1      * Fluticasone/Vilanterol 200-25 Mcg Inh (Breo Ellipta 200-25 Mcg Inh) 1 EACH       BLST.W.DEV: 1 PUFF INH QDAY 30 Days #1      Discontinued Medications      * predniSONE* 20 MG TABLET: 20 MG PO BID 7 Days #14      * Linezoid (Zyvox) 600 MG TAB: 600 MG PO BID #10      New Diagnostics      * 6 Min Walk w O2 Titration Test, Routine         Dx: Dyspnea - R06.00      * PFT-Comp, PrePost,DLCO,BodyBox, Week         Dx: Dyspnea - R06.00      * Chest W/O Cont CT, SCHEDULED PROCEDURE         Dx: Dyspnea - R06.00      New Office Procedures      * Flu Vacc Fluarix Quadrivalent, As Soon As Possible         Flu Vacc [id number](6Mos Up)/Pf  (Fluarix Quadrivalent [id number] Syringe) 60        MCG/0.5 ML SYRINGE: 60 MICROGRAM INTRAMUSCULARLY Qty 1 SYRINGE      IMPRESSION:      1.  Chronic dyspnea.      2. Chronic wheeze.      3. Chronic cough.      4. Acute pulmonary embolism in 07/2019 on Xarelto therapy without cor pulmonale.      5. History of bilateral MRSA pneumonia in 07/2019.      6.  Interstitial lung disease with question of cystic lung disease on chest x-    ray.      7. Morbid obesity, BMI 40.8.      8. Chronic hypoxemic respiratory failure.      9. Very severe COPD, FEV1 is 21% in 2008, only on Dulera therapy. COPD     assessment test score is 32 signifying poor control of underlying disease on     current therapies.               PLAN:      1.  I performed exhale nitric oxide testing in the office today.  Level of 6     indicative of no eosinophilic airway inflammation.       2.  Check noncontrast chest CT now to get a better appreciation of cystic lung     disease and pulmonary fibrosis, unclear of the etiology of this.      3. Stop Dulera.  Start Symbicort 160/4.5 two puffs twice a day and Spiriva     Respimat two puffs daily.  Inhaler education provided today. Continue albuterol     as needed.      4. Continue 2 liters of oxygen and keep SPO2 greater than 90%.      5. Check full PFTs and six minute walk test.      6. Check alpha 1 antitrypsin level and genotype.      7. Give patient flu vaccination today. He refuses any Prevnar or Pneumovax at th    is time.  We will reassess next visit.        8. I spent 4 minutes counseling the patient on diet and exercise.  I recommended    30 minutes of daily exercise and a 1800 calorie a day low fat diet.  The patient    verbalized understanding and will make attempts to lose weight.        9. Continue 2 liters of oxygen to keep SPO2 greater than 90%.      10. We will have patient follow up with us in 2-3 months to reassess his     symptoms and discuss his test results.            Patient Education       ACO BMI High above 25:  Counseling Given, Encouraged weight loss, Encourage     dietary changes      Patient Education Provided:  COPD, How to use an Inhaler            Electronically signed by NEVILLE SANTORO  10/31/2019 07:21       Disclaimer: Converted document may not contain table formatting or lab diagrams. Please see [employer] for the authenticated document.

## 2021-06-08 RX ORDER — METFORMIN HYDROCHLORIDE 500 MG/1
TABLET, EXTENDED RELEASE ORAL
Qty: 60 TABLET | Refills: 0 | Status: SHIPPED | OUTPATIENT
Start: 2021-06-08 | End: 2021-07-27

## 2021-06-09 DIAGNOSIS — Z79.4 TYPE 2 DIABETES MELLITUS WITH HYPERGLYCEMIA, WITH LONG-TERM CURRENT USE OF INSULIN (HCC): Primary | ICD-10-CM

## 2021-06-09 DIAGNOSIS — E11.65 TYPE 2 DIABETES MELLITUS WITH HYPERGLYCEMIA, WITH LONG-TERM CURRENT USE OF INSULIN (HCC): Primary | ICD-10-CM

## 2021-06-10 DIAGNOSIS — E11.65 TYPE 2 DIABETES MELLITUS WITH HYPERGLYCEMIA, UNSPECIFIED WHETHER LONG TERM INSULIN USE (HCC): Primary | ICD-10-CM

## 2021-06-15 RX ORDER — DULOXETIN HYDROCHLORIDE 60 MG/1
60 CAPSULE, DELAYED RELEASE ORAL 2 TIMES DAILY
COMMUNITY
End: 2021-07-30

## 2021-06-15 RX ORDER — ASPIRIN 81 MG/1
81 TABLET ORAL DAILY
COMMUNITY
End: 2022-07-06 | Stop reason: SDUPTHER

## 2021-06-15 RX ORDER — IPRATROPIUM BROMIDE AND ALBUTEROL SULFATE 2.5; .5 MG/3ML; MG/3ML
3 SOLUTION RESPIRATORY (INHALATION) EVERY 4 HOURS PRN
COMMUNITY
End: 2022-06-03 | Stop reason: SDUPTHER

## 2021-06-15 RX ORDER — DILTIAZEM HYDROCHLORIDE 120 MG/1
1 CAPSULE, COATED, EXTENDED RELEASE ORAL DAILY
COMMUNITY
Start: 2021-06-14 | End: 2021-07-30

## 2021-06-15 RX ORDER — ATORVASTATIN CALCIUM 20 MG/1
20 TABLET, FILM COATED ORAL DAILY
COMMUNITY
End: 2021-07-13

## 2021-06-15 RX ORDER — METOPROLOL TARTRATE 100 MG/1
100 TABLET ORAL
COMMUNITY
End: 2021-06-16 | Stop reason: SDUPTHER

## 2021-06-15 RX ORDER — TRAZODONE HYDROCHLORIDE 50 MG/1
50 TABLET ORAL NIGHTLY
COMMUNITY
End: 2021-07-21

## 2021-06-15 RX ORDER — LORATADINE 10 MG/1
10 TABLET ORAL DAILY
COMMUNITY
End: 2021-07-27

## 2021-06-15 RX ORDER — OMEPRAZOLE 40 MG/1
40 CAPSULE, DELAYED RELEASE ORAL DAILY
COMMUNITY
End: 2021-11-01

## 2021-06-15 RX ORDER — METFORMIN HYDROCHLORIDE 500 MG/1
1000 TABLET, EXTENDED RELEASE ORAL 2 TIMES DAILY
COMMUNITY
End: 2021-07-27

## 2021-06-15 RX ORDER — ALBUTEROL SULFATE 90 UG/1
2 AEROSOL, METERED RESPIRATORY (INHALATION) 4 TIMES DAILY PRN
COMMUNITY
End: 2022-06-03 | Stop reason: SDUPTHER

## 2021-06-15 RX ORDER — APIXABAN 5 MG/1
1 TABLET, FILM COATED ORAL 2 TIMES DAILY
COMMUNITY
Start: 2021-05-20 | End: 2021-07-30

## 2021-06-15 RX ORDER — INSULIN GLARGINE 100 [IU]/ML
40 INJECTION, SOLUTION SUBCUTANEOUS DAILY
COMMUNITY
End: 2021-09-13

## 2021-06-15 RX ORDER — FAMOTIDINE 40 MG/1
40 TABLET, FILM COATED ORAL DAILY
COMMUNITY
End: 2021-07-21

## 2021-06-15 RX ORDER — BUDESONIDE AND FORMOTEROL FUMARATE DIHYDRATE 160; 4.5 UG/1; UG/1
2 AEROSOL RESPIRATORY (INHALATION) 2 TIMES DAILY
COMMUNITY
End: 2022-04-05

## 2021-06-15 RX ORDER — FUROSEMIDE 40 MG/1
40 TABLET ORAL DAILY
COMMUNITY
End: 2021-07-21

## 2021-06-15 RX ORDER — FERROUS SULFATE 325(65) MG
325 TABLET ORAL
COMMUNITY
End: 2021-08-23

## 2021-06-16 RX ORDER — HYDRALAZINE HYDROCHLORIDE 25 MG/1
25 TABLET, FILM COATED ORAL 2 TIMES DAILY
COMMUNITY
End: 2021-07-30

## 2021-06-16 RX ORDER — FERROUS SULFATE 325(65) MG
TABLET ORAL
Qty: 30 TABLET | Refills: 1 | Status: SHIPPED | OUTPATIENT
Start: 2021-06-16 | End: 2021-07-27

## 2021-06-16 RX ORDER — APIXABAN 5 MG/1
TABLET, FILM COATED ORAL
Qty: 60 TABLET | Refills: 0 | Status: SHIPPED | OUTPATIENT
Start: 2021-06-16 | End: 2021-07-27

## 2021-06-16 RX ORDER — METOPROLOL TARTRATE 100 MG/1
TABLET ORAL
Qty: 90 TABLET | Refills: 1 | Status: SHIPPED | OUTPATIENT
Start: 2021-06-16 | End: 2021-09-13

## 2021-06-16 RX ORDER — CETIRIZINE HYDROCHLORIDE 10 MG/1
10 TABLET ORAL DAILY
COMMUNITY
End: 2021-07-27

## 2021-06-16 NOTE — TELEPHONE ENCOUNTER
eliquis LF:5/20/21 #60  Ferrous sulf LF:3/20/21 #30 w/ 2RF's  Metoprolol LF:3/4/21 #90 w/ 2RF's  LV:1/7/21./[facility]

## 2021-07-01 VITALS
BODY MASS INDEX: 43.78 KG/M2 | TEMPERATURE: 98.4 F | HEART RATE: 100 BPM | HEIGHT: 69 IN | SYSTOLIC BLOOD PRESSURE: 185 MMHG | DIASTOLIC BLOOD PRESSURE: 79 MMHG | WEIGHT: 295.6 LBS

## 2021-07-01 VITALS
DIASTOLIC BLOOD PRESSURE: 68 MMHG | TEMPERATURE: 97 F | HEART RATE: 101 BPM | SYSTOLIC BLOOD PRESSURE: 129 MMHG | WEIGHT: 315 LBS | HEIGHT: 69 IN | BODY MASS INDEX: 46.65 KG/M2

## 2021-07-01 VITALS
WEIGHT: 283.2 LBS | DIASTOLIC BLOOD PRESSURE: 82 MMHG | BODY MASS INDEX: 41.95 KG/M2 | HEART RATE: 100 BPM | SYSTOLIC BLOOD PRESSURE: 142 MMHG | HEIGHT: 69 IN | TEMPERATURE: 97.4 F

## 2021-07-01 VITALS
HEART RATE: 109 BPM | OXYGEN SATURATION: 94 % | TEMPERATURE: 98.4 F | SYSTOLIC BLOOD PRESSURE: 159 MMHG | WEIGHT: 294 LBS | DIASTOLIC BLOOD PRESSURE: 82 MMHG | HEIGHT: 69 IN | BODY MASS INDEX: 43.55 KG/M2

## 2021-07-01 VITALS
HEART RATE: 82 BPM | WEIGHT: 238 LBS | OXYGEN SATURATION: 100 % | TEMPERATURE: 97.3 F | HEIGHT: 69 IN | BODY MASS INDEX: 35.25 KG/M2 | DIASTOLIC BLOOD PRESSURE: 30 MMHG | SYSTOLIC BLOOD PRESSURE: 54 MMHG

## 2021-07-01 VITALS
HEART RATE: 85 BPM | TEMPERATURE: 98.4 F | HEIGHT: 69 IN | DIASTOLIC BLOOD PRESSURE: 41 MMHG | SYSTOLIC BLOOD PRESSURE: 111 MMHG | WEIGHT: 284 LBS | BODY MASS INDEX: 42.06 KG/M2

## 2021-07-01 VITALS
SYSTOLIC BLOOD PRESSURE: 153 MMHG | TEMPERATURE: 98.2 F | OXYGEN SATURATION: 92 % | HEIGHT: 69 IN | WEIGHT: 293 LBS | HEART RATE: 85 BPM | DIASTOLIC BLOOD PRESSURE: 58 MMHG | BODY MASS INDEX: 43.4 KG/M2

## 2021-07-01 VITALS
WEIGHT: 277.4 LBS | TEMPERATURE: 97 F | BODY MASS INDEX: 41.09 KG/M2 | OXYGEN SATURATION: 98 % | HEART RATE: 98 BPM | DIASTOLIC BLOOD PRESSURE: 69 MMHG | SYSTOLIC BLOOD PRESSURE: 119 MMHG | HEIGHT: 69 IN

## 2021-07-01 VITALS
TEMPERATURE: 97.9 F | HEART RATE: 85 BPM | WEIGHT: 272 LBS | SYSTOLIC BLOOD PRESSURE: 147 MMHG | OXYGEN SATURATION: 96 % | BODY MASS INDEX: 40.29 KG/M2 | HEIGHT: 69 IN | DIASTOLIC BLOOD PRESSURE: 63 MMHG

## 2021-07-01 VITALS
DIASTOLIC BLOOD PRESSURE: 67 MMHG | SYSTOLIC BLOOD PRESSURE: 88 MMHG | TEMPERATURE: 97.2 F | OXYGEN SATURATION: 98 % | HEART RATE: 68 BPM | HEIGHT: 69 IN | BODY MASS INDEX: 43.72 KG/M2 | WEIGHT: 295.2 LBS

## 2021-07-01 VITALS
HEART RATE: 98 BPM | BODY MASS INDEX: 43.58 KG/M2 | SYSTOLIC BLOOD PRESSURE: 99 MMHG | HEIGHT: 69 IN | TEMPERATURE: 98.4 F | DIASTOLIC BLOOD PRESSURE: 51 MMHG | WEIGHT: 294.2 LBS

## 2021-07-01 VITALS
DIASTOLIC BLOOD PRESSURE: 75 MMHG | HEART RATE: 110 BPM | HEIGHT: 69 IN | WEIGHT: 293 LBS | SYSTOLIC BLOOD PRESSURE: 152 MMHG | TEMPERATURE: 97.6 F | BODY MASS INDEX: 43.4 KG/M2

## 2021-07-01 VITALS
HEIGHT: 69 IN | WEIGHT: 276.4 LBS | BODY MASS INDEX: 40.94 KG/M2 | SYSTOLIC BLOOD PRESSURE: 103 MMHG | TEMPERATURE: 97.7 F | DIASTOLIC BLOOD PRESSURE: 57 MMHG | OXYGEN SATURATION: 96 % | HEART RATE: 74 BPM

## 2021-07-01 VITALS
HEIGHT: 69 IN | OXYGEN SATURATION: 96 % | DIASTOLIC BLOOD PRESSURE: 72 MMHG | BODY MASS INDEX: 42.51 KG/M2 | SYSTOLIC BLOOD PRESSURE: 141 MMHG | TEMPERATURE: 97.6 F | WEIGHT: 287 LBS | HEART RATE: 90 BPM

## 2021-07-01 VITALS
BODY MASS INDEX: 42.15 KG/M2 | TEMPERATURE: 97.5 F | HEIGHT: 69 IN | SYSTOLIC BLOOD PRESSURE: 140 MMHG | WEIGHT: 284.6 LBS | OXYGEN SATURATION: 85 % | HEART RATE: 84 BPM | DIASTOLIC BLOOD PRESSURE: 57 MMHG

## 2021-07-01 VITALS
SYSTOLIC BLOOD PRESSURE: 123 MMHG | TEMPERATURE: 98 F | HEIGHT: 69 IN | BODY MASS INDEX: 45.47 KG/M2 | HEART RATE: 104 BPM | DIASTOLIC BLOOD PRESSURE: 68 MMHG | WEIGHT: 307 LBS

## 2021-07-01 VITALS
OXYGEN SATURATION: 99 % | DIASTOLIC BLOOD PRESSURE: 56 MMHG | HEART RATE: 92 BPM | TEMPERATURE: 97.5 F | BODY MASS INDEX: 42.21 KG/M2 | WEIGHT: 285 LBS | SYSTOLIC BLOOD PRESSURE: 98 MMHG | HEIGHT: 69 IN

## 2021-07-01 VITALS
HEIGHT: 69 IN | TEMPERATURE: 98.1 F | WEIGHT: 288.2 LBS | BODY MASS INDEX: 42.69 KG/M2 | HEART RATE: 103 BPM | OXYGEN SATURATION: 95 % | DIASTOLIC BLOOD PRESSURE: 57 MMHG | SYSTOLIC BLOOD PRESSURE: 132 MMHG

## 2021-07-02 VITALS
SYSTOLIC BLOOD PRESSURE: 141 MMHG | OXYGEN SATURATION: 93 % | DIASTOLIC BLOOD PRESSURE: 62 MMHG | HEIGHT: 69 IN | TEMPERATURE: 97.6 F | WEIGHT: 280 LBS | HEART RATE: 76 BPM | BODY MASS INDEX: 41.47 KG/M2

## 2021-07-02 VITALS
DIASTOLIC BLOOD PRESSURE: 69 MMHG | OXYGEN SATURATION: 95 % | SYSTOLIC BLOOD PRESSURE: 151 MMHG | HEART RATE: 132 BPM | BODY MASS INDEX: 39.99 KG/M2 | WEIGHT: 270 LBS | HEIGHT: 69 IN | TEMPERATURE: 98.7 F

## 2021-07-02 VITALS
SYSTOLIC BLOOD PRESSURE: 158 MMHG | WEIGHT: 270 LBS | DIASTOLIC BLOOD PRESSURE: 75 MMHG | TEMPERATURE: 98.3 F | HEART RATE: 82 BPM | BODY MASS INDEX: 39.99 KG/M2 | HEIGHT: 69 IN

## 2021-07-02 VITALS
DIASTOLIC BLOOD PRESSURE: 59 MMHG | BODY MASS INDEX: 40.97 KG/M2 | TEMPERATURE: 97.1 F | WEIGHT: 276.6 LBS | SYSTOLIC BLOOD PRESSURE: 122 MMHG | HEIGHT: 69 IN | HEART RATE: 103 BPM

## 2021-07-02 VITALS
TEMPERATURE: 98.7 F | SYSTOLIC BLOOD PRESSURE: 134 MMHG | HEIGHT: 69 IN | DIASTOLIC BLOOD PRESSURE: 52 MMHG | HEART RATE: 83 BPM | BODY MASS INDEX: 41.47 KG/M2 | OXYGEN SATURATION: 92 % | WEIGHT: 280 LBS

## 2021-07-02 VITALS — HEIGHT: 69 IN | BODY MASS INDEX: 42.36 KG/M2 | WEIGHT: 286 LBS

## 2021-07-02 VITALS
HEART RATE: 76 BPM | HEIGHT: 69 IN | WEIGHT: 279 LBS | TEMPERATURE: 97.6 F | BODY MASS INDEX: 41.32 KG/M2 | BODY MASS INDEX: 39.87 KG/M2 | TEMPERATURE: 97.6 F | DIASTOLIC BLOOD PRESSURE: 60 MMHG | HEIGHT: 69 IN | SYSTOLIC BLOOD PRESSURE: 118 MMHG

## 2021-07-02 VITALS — BODY MASS INDEX: 37.98 KG/M2 | TEMPERATURE: 98 F | RESPIRATION RATE: 18 BRPM | HEIGHT: 69 IN

## 2021-07-02 VITALS
WEIGHT: 257.2 LBS | OXYGEN SATURATION: 100 % | DIASTOLIC BLOOD PRESSURE: 67 MMHG | TEMPERATURE: 96.5 F | HEIGHT: 69 IN | BODY MASS INDEX: 38.09 KG/M2 | HEART RATE: 65 BPM | SYSTOLIC BLOOD PRESSURE: 124 MMHG

## 2021-07-02 VITALS
BODY MASS INDEX: 41.35 KG/M2 | HEIGHT: 69 IN | HEART RATE: 100 BPM | SYSTOLIC BLOOD PRESSURE: 98 MMHG | TEMPERATURE: 98.3 F | DIASTOLIC BLOOD PRESSURE: 55 MMHG

## 2021-07-02 VITALS
DIASTOLIC BLOOD PRESSURE: 59 MMHG | BODY MASS INDEX: 39.28 KG/M2 | HEIGHT: 69 IN | SYSTOLIC BLOOD PRESSURE: 141 MMHG | HEART RATE: 88 BPM

## 2021-07-02 VITALS
WEIGHT: 266 LBS | DIASTOLIC BLOOD PRESSURE: 54 MMHG | BODY MASS INDEX: 39.4 KG/M2 | HEART RATE: 60 BPM | TEMPERATURE: 97 F | HEIGHT: 69 IN | SYSTOLIC BLOOD PRESSURE: 139 MMHG

## 2021-07-02 VITALS
HEIGHT: 69 IN | SYSTOLIC BLOOD PRESSURE: 156 MMHG | DIASTOLIC BLOOD PRESSURE: 83 MMHG | WEIGHT: 266 LBS | TEMPERATURE: 97.1 F | BODY MASS INDEX: 39.4 KG/M2 | HEART RATE: 127 BPM

## 2021-07-02 VITALS
HEART RATE: 89 BPM | TEMPERATURE: 98.2 F | SYSTOLIC BLOOD PRESSURE: 147 MMHG | WEIGHT: 270 LBS | BODY MASS INDEX: 39.99 KG/M2 | HEIGHT: 69 IN | DIASTOLIC BLOOD PRESSURE: 62 MMHG

## 2021-07-02 VITALS
HEART RATE: 60 BPM | BODY MASS INDEX: 40.35 KG/M2 | TEMPERATURE: 97.1 F | WEIGHT: 272.4 LBS | SYSTOLIC BLOOD PRESSURE: 136 MMHG | OXYGEN SATURATION: 93 % | HEIGHT: 69 IN | DIASTOLIC BLOOD PRESSURE: 61 MMHG

## 2021-07-12 DIAGNOSIS — E78.5 HYPERLIPIDEMIA, UNSPECIFIED HYPERLIPIDEMIA TYPE: Primary | ICD-10-CM

## 2021-07-13 RX ORDER — ATORVASTATIN CALCIUM 20 MG/1
TABLET, FILM COATED ORAL
Qty: 90 TABLET | Refills: 0 | Status: SHIPPED | OUTPATIENT
Start: 2021-07-13 | End: 2021-11-01

## 2021-07-21 RX ORDER — FAMOTIDINE 40 MG/1
TABLET, FILM COATED ORAL
Qty: 90 TABLET | Refills: 0 | Status: SHIPPED | OUTPATIENT
Start: 2021-07-21 | End: 2021-11-01

## 2021-07-21 RX ORDER — TRAZODONE HYDROCHLORIDE 50 MG/1
TABLET ORAL
Qty: 90 TABLET | Refills: 0 | Status: SHIPPED | OUTPATIENT
Start: 2021-07-21 | End: 2021-11-01

## 2021-07-21 RX ORDER — FUROSEMIDE 40 MG/1
TABLET ORAL
Qty: 90 TABLET | Refills: 0 | Status: SHIPPED | OUTPATIENT
Start: 2021-07-21 | End: 2021-11-01

## 2021-07-27 ENCOUNTER — OFFICE VISIT (OUTPATIENT)
Dept: FAMILY MEDICINE CLINIC | Age: 56
End: 2021-07-27

## 2021-07-27 ENCOUNTER — LAB (OUTPATIENT)
Dept: LAB | Facility: HOSPITAL | Age: 56
End: 2021-07-27

## 2021-07-27 VITALS
OXYGEN SATURATION: 98 % | WEIGHT: 243 LBS | HEIGHT: 69 IN | TEMPERATURE: 98 F | DIASTOLIC BLOOD PRESSURE: 59 MMHG | BODY MASS INDEX: 35.99 KG/M2 | HEART RATE: 105 BPM | SYSTOLIC BLOOD PRESSURE: 126 MMHG

## 2021-07-27 DIAGNOSIS — G62.9 PERIPHERAL POLYNEUROPATHY: ICD-10-CM

## 2021-07-27 DIAGNOSIS — Z79.4 TYPE 2 DIABETES MELLITUS WITH HYPERGLYCEMIA, WITH LONG-TERM CURRENT USE OF INSULIN (HCC): ICD-10-CM

## 2021-07-27 DIAGNOSIS — G89.29 CHRONIC BILATERAL LOW BACK PAIN, UNSPECIFIED WHETHER SCIATICA PRESENT: ICD-10-CM

## 2021-07-27 DIAGNOSIS — E11.65 TYPE 2 DIABETES MELLITUS WITH HYPERGLYCEMIA, WITH LONG-TERM CURRENT USE OF INSULIN (HCC): ICD-10-CM

## 2021-07-27 DIAGNOSIS — G25.81 RESTLESS LEG SYNDROME: ICD-10-CM

## 2021-07-27 DIAGNOSIS — M25.511 ACUTE PAIN OF RIGHT SHOULDER: ICD-10-CM

## 2021-07-27 DIAGNOSIS — I10 ESSENTIAL HYPERTENSION: ICD-10-CM

## 2021-07-27 DIAGNOSIS — Z79.899 HIGH RISK MEDICATION USE: Primary | ICD-10-CM

## 2021-07-27 DIAGNOSIS — E78.5 HYPERLIPIDEMIA, UNSPECIFIED HYPERLIPIDEMIA TYPE: ICD-10-CM

## 2021-07-27 DIAGNOSIS — M54.50 CHRONIC BILATERAL LOW BACK PAIN, UNSPECIFIED WHETHER SCIATICA PRESENT: ICD-10-CM

## 2021-07-27 DIAGNOSIS — K59.09 OTHER CONSTIPATION: ICD-10-CM

## 2021-07-27 LAB
ALBUMIN SERPL-MCNC: 3.4 G/DL (ref 3.5–5.2)
ALBUMIN UR-MCNC: 171.9 MG/DL
ALBUMIN/GLOB SERPL: 0.7 G/DL
ALP SERPL-CCNC: 182 U/L (ref 39–117)
ALT SERPL W P-5'-P-CCNC: 12 U/L (ref 1–41)
AMPHET+METHAMPHET UR QL: NEGATIVE
AMPHETAMINES UR QL: NEGATIVE
ANION GAP SERPL CALCULATED.3IONS-SCNC: 14.4 MMOL/L (ref 5–15)
AST SERPL-CCNC: 15 U/L (ref 1–40)
BARBITURATES UR QL SCN: NEGATIVE
BENZODIAZ UR QL SCN: NEGATIVE
BILIRUB SERPL-MCNC: 0.3 MG/DL (ref 0–1.2)
BUN SERPL-MCNC: 15 MG/DL (ref 6–20)
BUN/CREAT SERPL: 9.5 (ref 7–25)
BUPRENORPHINE SERPL-MCNC: NEGATIVE NG/ML
CALCIUM SPEC-SCNC: 9.4 MG/DL (ref 8.6–10.5)
CANNABINOIDS SERPL QL: NEGATIVE
CHLORIDE SERPL-SCNC: 90 MMOL/L (ref 98–107)
CHOLEST SERPL-MCNC: 183 MG/DL (ref 0–200)
CO2 SERPL-SCNC: 28.6 MMOL/L (ref 22–29)
COCAINE UR QL: NEGATIVE
CREAT SERPL-MCNC: 1.58 MG/DL (ref 0.76–1.27)
CREAT UR-MCNC: 35.9 MG/DL
EXPIRATION DATE: NORMAL
GFR SERPL CREATININE-BSD FRML MDRD: 46 ML/MIN/1.73
GLOBULIN UR ELPH-MCNC: 4.6 GM/DL
GLUCOSE SERPL-MCNC: 402 MG/DL (ref 65–99)
HDLC SERPL-MCNC: 38 MG/DL (ref 40–60)
LDLC SERPL CALC-MCNC: 114 MG/DL (ref 0–100)
LDLC/HDLC SERPL: 2.91 {RATIO}
Lab: NORMAL
MDMA UR QL SCN: NEGATIVE
METHADONE UR QL SCN: NEGATIVE
MICROALBUMIN/CREAT UR: 4788.3 MG/G
OPIATES UR QL: NEGATIVE
OXYCODONE UR QL SCN: NEGATIVE
PCP UR QL SCN: NEGATIVE
POTASSIUM SERPL-SCNC: 3.9 MMOL/L (ref 3.5–5.2)
PROT SERPL-MCNC: 8 G/DL (ref 6–8.5)
SODIUM SERPL-SCNC: 133 MMOL/L (ref 136–145)
TRIGL SERPL-MCNC: 173 MG/DL (ref 0–150)
VLDLC SERPL-MCNC: 31 MG/DL (ref 5–40)

## 2021-07-27 PROCEDURE — 80053 COMPREHEN METABOLIC PANEL: CPT

## 2021-07-27 PROCEDURE — 82043 UR ALBUMIN QUANTITATIVE: CPT

## 2021-07-27 PROCEDURE — 80061 LIPID PANEL: CPT

## 2021-07-27 PROCEDURE — 99214 OFFICE O/P EST MOD 30 MIN: CPT | Performed by: FAMILY MEDICINE

## 2021-07-27 PROCEDURE — 82570 ASSAY OF URINE CREATININE: CPT

## 2021-07-27 PROCEDURE — 80305 DRUG TEST PRSMV DIR OPT OBS: CPT | Performed by: FAMILY MEDICINE

## 2021-07-27 PROCEDURE — 36415 COLL VENOUS BLD VENIPUNCTURE: CPT

## 2021-07-27 RX ORDER — DOCUSATE SODIUM 100 MG/1
100 CAPSULE, LIQUID FILLED ORAL 2 TIMES DAILY
Qty: 180 CAPSULE | Refills: 1 | Status: SHIPPED | OUTPATIENT
Start: 2021-07-27 | End: 2021-10-25

## 2021-07-27 RX ORDER — GABAPENTIN 300 MG/1
300 CAPSULE ORAL
Qty: 90 CAPSULE | Refills: 0 | Status: SHIPPED | OUTPATIENT
Start: 2021-07-27 | End: 2021-07-28 | Stop reason: SDUPTHER

## 2021-07-28 ENCOUNTER — TELEPHONE (OUTPATIENT)
Dept: FAMILY MEDICINE CLINIC | Age: 56
End: 2021-07-28

## 2021-07-28 DIAGNOSIS — E11.65 TYPE 2 DIABETES MELLITUS WITH HYPERGLYCEMIA, WITH LONG-TERM CURRENT USE OF INSULIN (HCC): Primary | ICD-10-CM

## 2021-07-28 DIAGNOSIS — G89.29 CHRONIC BILATERAL LOW BACK PAIN, UNSPECIFIED WHETHER SCIATICA PRESENT: ICD-10-CM

## 2021-07-28 DIAGNOSIS — M25.511 ACUTE PAIN OF RIGHT SHOULDER: ICD-10-CM

## 2021-07-28 DIAGNOSIS — Z79.4 TYPE 2 DIABETES MELLITUS WITH HYPERGLYCEMIA, WITH LONG-TERM CURRENT USE OF INSULIN (HCC): Primary | ICD-10-CM

## 2021-07-28 DIAGNOSIS — M54.50 CHRONIC BILATERAL LOW BACK PAIN, UNSPECIFIED WHETHER SCIATICA PRESENT: ICD-10-CM

## 2021-07-28 RX ORDER — GABAPENTIN 300 MG/1
300 CAPSULE ORAL
Qty: 90 CAPSULE | Refills: 0 | Status: SHIPPED | OUTPATIENT
Start: 2021-07-28 | End: 2021-12-15 | Stop reason: SDUPTHER

## 2021-07-28 NOTE — TELEPHONE ENCOUNTER
Good morning.  I just spoke with Gómez regarding his critical glucose value.  I did ask him to check his sugar while he was on the phone and it was still about 440.  He did not take his insulin last night.  I advised him to go ahead and take 8 units of Admelog along with 40 units of Basaglar.  I triple check this with him given the early hours.  He and his wife Kami expressed understanding.  I recommended he repeat the sugar in about an hour and call us if the sugar is not coming down.  He indicated he would do this.  He denied confusion or other worrisome issue.     Please reach out to him this morning and see how he is doing.  Then, please follow-up with Dr. Riley about this.  He may need to consider changing the time of day for the long-acting insulin and/or possibly being changed to one of the newer medications that can be dosed during the day more readily.  Thanks.

## 2021-07-28 NOTE — TELEPHONE ENCOUNTER
Pt's wife is calling and reports that crumes did not get any rx from yesterday's ov can you resend

## 2021-07-28 NOTE — TELEPHONE ENCOUNTER
Inform patient that I Susana set him up with the diabetic educator to work on his insulin and his diet

## 2021-07-28 NOTE — TELEPHONE ENCOUNTER
I called pt and he is out and about and he feels great he said his blood sugar was 294 and she took 6 units of admelog this am . DR Bourne is out of the office today so I also sent this info to on call DR Dobson

## 2021-07-29 RX ORDER — GLUCOSAM/CHON-MSM1/C/MANG/BOSW 500-416.6
TABLET ORAL
Qty: 100 EACH | Refills: 0 | Status: SHIPPED | OUTPATIENT
Start: 2021-07-29

## 2021-07-30 RX ORDER — APIXABAN 5 MG/1
TABLET, FILM COATED ORAL
Qty: 60 TABLET | Refills: 0 | Status: SHIPPED | OUTPATIENT
Start: 2021-07-30 | End: 2021-08-31

## 2021-07-30 RX ORDER — HYDRALAZINE HYDROCHLORIDE 25 MG/1
TABLET, FILM COATED ORAL
Qty: 180 TABLET | Refills: 1 | Status: SHIPPED | OUTPATIENT
Start: 2021-07-30 | End: 2022-03-03

## 2021-07-30 RX ORDER — DULOXETIN HYDROCHLORIDE 60 MG/1
CAPSULE, DELAYED RELEASE ORAL
Qty: 180 CAPSULE | Refills: 0 | Status: SHIPPED | OUTPATIENT
Start: 2021-07-30 | End: 2022-03-17 | Stop reason: SDUPTHER

## 2021-07-30 RX ORDER — DILTIAZEM HYDROCHLORIDE 120 MG/1
CAPSULE, COATED, EXTENDED RELEASE ORAL
Qty: 30 CAPSULE | Refills: 1 | Status: SHIPPED | OUTPATIENT
Start: 2021-07-30 | End: 2021-09-13

## 2021-08-12 ENCOUNTER — NUTRITION (OUTPATIENT)
Dept: DIABETES SERVICES | Facility: HOSPITAL | Age: 56
End: 2021-08-12

## 2021-08-12 DIAGNOSIS — E11.65 UNCONTROLLED TYPE 2 DIABETES MELLITUS WITH HYPERGLYCEMIA (HCC): Primary | ICD-10-CM

## 2021-08-12 PROCEDURE — 97802 MEDICAL NUTRITION INDIV IN: CPT | Performed by: DIETITIAN, REGISTERED

## 2021-08-13 VITALS — HEIGHT: 69 IN | BODY MASS INDEX: 35.99 KG/M2 | WEIGHT: 243 LBS

## 2021-08-13 NOTE — PROGRESS NOTES
"Preston Wallis presents to [facility] for nutrition consult r/t diagnosis of T2DM.     General Information  General Information   Referral From:: MD hitchcock  Height: 175.3 cm (69\")  Height Method: Actual  Weight: 110 kg (243 lb)  Weight Method: Standing scale  Is patient pregnant?: n/a    Diabetes History  Diabetes History  What type of diabetes do you have?: Type 2  Length of Diabetes Diagnosis: 10 + years  Current DM knowledge: good  Do you test your blood sugar at home?: yes  Frequency of checks: 3x/day  Typical readings: fasting- 107; before lunch 210-220; before dinner 500s    Education Preferences  Education Preferences  What areas of diabetes would you like to learn about?: diet information    Nutrition Information  Nutrition Information  When was the last time you saw a dietician?: never  Enter everything you can remember eating in the last 24 hours (1 day): breakfast- egg, 1/2 biscuit; lunch- fast food, 2 piece fish meal, fried okra, hush puppies; dinner- turkey, gravy, dressing, fried green tomatoes, fried zucchini, mashed potatoes; snacks- peanuts, frosted mini wheats; beverages- diet tea, sugar-free flavor in water  How many meals do you eat each day?: 2  How many snacks do you eat each day?: 1  What is the biggest challenge you have with your diet?: Knowledge, Portions, Eating out    Medications  Pt reports taking Bydureon, Basaglar (65 units/day), Admelog (amount varies, he usually only takes 1-2x/day).  Pt states he is more consistent w/ taking his meds.    Labs   Lab Results   Component Value Date    HGBA1C 11.1 (H) 08/26/2020        Nutrition counseling provided on carbohydrate counting, portion control, measuring and reading labels. Discussed eating out and gave suggestions on controlling carbohydrate intake and making healthier food choices.     Meal Plan:   Total Carbohydrates per meal: 3-4 carb servings/meal, at least 3 meals/day  Lean protein with meals.  Limit " added fats.  Snacks: 1 carbohydrate serving (</= 22 g) + 1 protein serving.     Daily exercise encouraged (as recommended by healthcare provider). Discussed the befits of exercise in lowering blood glucose, blood pressure, cholesterol, stress and controlling body weight.     Advised to continue daily blood glucose monitoring to assist with understanding of factors affecting blood glucose and assist with management of diabetes.  Discussed and provided with target BG ranges.     Literature provided: Diabetes Nutrition Placemat, Choose Your Foods Booklet    Phone number provided and encouraged to call with questions or concerns.     Pt and wife would like to return for f/u visit, scheduled Sept 23 @ 9am.    Time spent with patient: 45 minutes    Demetrice Oshea RDN, NICKOLAS  08/12/2021

## 2021-08-16 RX ORDER — FLURBIPROFEN SODIUM 0.3 MG/ML
SOLUTION/ DROPS OPHTHALMIC
Qty: 100 EACH | Refills: 0 | Status: SHIPPED | OUTPATIENT
Start: 2021-08-16 | End: 2022-06-02 | Stop reason: SDUPTHER

## 2021-08-23 RX ORDER — FERROUS SULFATE 325(65) MG
TABLET ORAL
Qty: 30 TABLET | Refills: 1 | Status: SHIPPED | OUTPATIENT
Start: 2021-08-23 | End: 2021-11-01

## 2021-08-31 RX ORDER — APIXABAN 5 MG/1
TABLET, FILM COATED ORAL
Qty: 60 TABLET | Refills: 0 | Status: SHIPPED | OUTPATIENT
Start: 2021-08-31 | End: 2021-10-13

## 2021-09-07 ENCOUNTER — CLINICAL SUPPORT (OUTPATIENT)
Dept: FAMILY MEDICINE CLINIC | Age: 56
End: 2021-09-07

## 2021-09-07 DIAGNOSIS — Z20.822 EXPOSURE TO COVID-19 VIRUS: Primary | ICD-10-CM

## 2021-09-07 DIAGNOSIS — Z20.822 EXPOSURE TO COVID-19 VIRUS: ICD-10-CM

## 2021-09-07 PROCEDURE — 99211 OFF/OP EST MAY X REQ PHY/QHP: CPT | Performed by: NURSE PRACTITIONER

## 2021-09-07 PROCEDURE — U0004 COV-19 TEST NON-CDC HGH THRU: HCPCS | Performed by: NURSE PRACTITIONER

## 2021-09-07 PROCEDURE — C9803 HOPD COVID-19 SPEC COLLECT: HCPCS

## 2021-09-07 RX ORDER — EXENATIDE 2 MG/.85ML
INJECTION, SUSPENSION, EXTENDED RELEASE SUBCUTANEOUS
Qty: 4 ML | Refills: 2 | Status: SHIPPED | OUTPATIENT
Start: 2021-09-07 | End: 2022-03-17 | Stop reason: SDUPTHER

## 2021-09-08 ENCOUNTER — TELEPHONE (OUTPATIENT)
Dept: FAMILY MEDICINE CLINIC | Age: 56
End: 2021-09-08

## 2021-09-08 LAB — SARS-COV-2 RNA NOSE QL NAA+PROBE: NOT DETECTED

## 2021-09-08 NOTE — TELEPHONE ENCOUNTER
Caller: INGA URIARTE    Relationship: Emergency Contact    Best call back number: [phone]    Caller requesting test results: YES    What test was performed: COVID    When was the test performed: YESTERDAY 09/08/21    Where was the test performed: WITH IN [facility]

## 2021-09-13 RX ORDER — INSULIN GLARGINE 100 [IU]/ML
INJECTION, SOLUTION SUBCUTANEOUS
Qty: 15 ML | Refills: 0 | Status: SHIPPED | OUTPATIENT
Start: 2021-09-13 | End: 2021-10-13

## 2021-09-13 RX ORDER — DILTIAZEM HYDROCHLORIDE 120 MG/1
CAPSULE, COATED, EXTENDED RELEASE ORAL
Qty: 30 CAPSULE | Refills: 0 | Status: SHIPPED | OUTPATIENT
Start: 2021-09-13 | End: 2021-11-05

## 2021-09-13 RX ORDER — METOPROLOL TARTRATE 100 MG/1
TABLET ORAL
Qty: 90 TABLET | Refills: 0 | Status: SHIPPED | OUTPATIENT
Start: 2021-09-13 | End: 2021-11-05

## 2021-09-16 DIAGNOSIS — G89.29 CHRONIC BILATERAL LOW BACK PAIN, UNSPECIFIED WHETHER SCIATICA PRESENT: ICD-10-CM

## 2021-09-16 DIAGNOSIS — M25.511 ACUTE PAIN OF RIGHT SHOULDER: ICD-10-CM

## 2021-09-16 DIAGNOSIS — M54.50 CHRONIC BILATERAL LOW BACK PAIN, UNSPECIFIED WHETHER SCIATICA PRESENT: ICD-10-CM

## 2021-10-13 RX ORDER — APIXABAN 5 MG/1
TABLET, FILM COATED ORAL
Qty: 60 TABLET | Refills: 0 | Status: SHIPPED | OUTPATIENT
Start: 2021-10-13 | End: 2021-11-29 | Stop reason: SDUPTHER

## 2021-10-13 RX ORDER — INSULIN GLARGINE 100 [IU]/ML
INJECTION, SOLUTION SUBCUTANEOUS
Qty: 15 ML | Refills: 0 | Status: SHIPPED | OUTPATIENT
Start: 2021-10-13 | End: 2021-11-11

## 2021-10-19 ENCOUNTER — HOSPITAL ENCOUNTER (OUTPATIENT)
Dept: GENERAL RADIOLOGY | Facility: HOSPITAL | Age: 56
Discharge: HOME | End: 2021-10-19

## 2021-10-19 ENCOUNTER — TELEPHONE (OUTPATIENT)
Dept: FAMILY MEDICINE CLINIC | Age: 56
End: 2021-10-19

## 2021-10-19 ENCOUNTER — OFFICE VISIT (OUTPATIENT)
Dept: FAMILY MEDICINE CLINIC | Age: 56
End: 2021-10-19

## 2021-10-19 VITALS
OXYGEN SATURATION: 98 % | DIASTOLIC BLOOD PRESSURE: 47 MMHG | TEMPERATURE: 98.9 F | HEART RATE: 62 BPM | SYSTOLIC BLOOD PRESSURE: 117 MMHG

## 2021-10-19 DIAGNOSIS — U07.1 COVID-19: ICD-10-CM

## 2021-10-19 DIAGNOSIS — R05.9 COUGH: Primary | ICD-10-CM

## 2021-10-19 DIAGNOSIS — R05.9 COUGH: ICD-10-CM

## 2021-10-19 DIAGNOSIS — J44.9 CHRONIC OBSTRUCTIVE PULMONARY DISEASE, UNSPECIFIED COPD TYPE (HCC): ICD-10-CM

## 2021-10-19 LAB
EXPIRATION DATE: ABNORMAL
FLUAV AG UPPER RESP QL IA.RAPID: NOT DETECTED
FLUBV AG UPPER RESP QL IA.RAPID: NOT DETECTED
INTERNAL CONTROL: ABNORMAL
Lab: ABNORMAL
SARS-COV-2 AG UPPER RESP QL IA.RAPID: DETECTED

## 2021-10-19 PROCEDURE — 99213 OFFICE O/P EST LOW 20 MIN: CPT | Performed by: NURSE PRACTITIONER

## 2021-10-19 PROCEDURE — 87428 SARSCOV & INF VIR A&B AG IA: CPT | Performed by: NURSE PRACTITIONER

## 2021-10-19 NOTE — PROGRESS NOTES
Preston Wallis presents to [facility].    Chief Complaint:  Exposure To Known Illness (son tested positive for covid on 10/13/21, having fatigue and congestion)         History of Present Illness:  URI  When did symptoms start: 5 days ago   Any exposures:step son with covid/ he was + on 10-13-21  Symptoms: congestion and fatigue, increased cough and mild increase in his SOA  Treatment tried:albuterol in neb and inhaler, they have helped       COVID vaccines: not had     PMH: he has DM, HTN   COPD on disability/sees Emily mendiola/ saw his PCP here in 9-2021: He has h/o hospitalization for pseudomonas pneumonia in past, and he gets recurrent infections and sees Dr Chavez and is on intermittent tobramycin nebulizer.  He is also on albuterol/duo nebs and symbicort daily, albuterol nebulizers.    disabled    2 children         Review of Systems:  Review of Systems   Constitutional: Positive for fatigue and fever (99.8).   Respiratory: Positive for cough (productive cough at times) and wheezing (neb helps ).    Cardiovascular: Negative for chest pain.   Neurological:        No loss of taste or smell           Vital Signs:   /47 (BP Location: Left arm, Patient Position: Sitting)   Pulse 62   Temp 98.9 °F (37.2 °C) (Oral)   SpO2 98% Comment: 2 liters      Physical Exam:  Physical Exam  Constitutional:       General: He is not in acute distress.     Appearance: Normal appearance. He is obese.   HENT:      Right Ear: Tympanic membrane, ear canal and external ear normal.      Left Ear: Tympanic membrane, ear canal and external ear normal.      Nose: Nose normal.      Mouth/Throat:      Pharynx: Oropharynx is clear. No posterior oropharyngeal erythema.   Cardiovascular:      Rate and Rhythm: Normal rate and regular rhythm.      Heart sounds: No murmur heard.      Pulmonary:      Effort: Pulmonary effort is normal.      Breath sounds: Wheezing (heard faintly throughout lung  fields ) present.   Musculoskeletal:      Comments: Sitting in wheelchair    Lymphadenopathy:      Cervical: No cervical adenopathy.   Neurological:      Mental Status: He is alert.   Psychiatric:         Mood and Affect: Mood normal.         Behavior: Behavior normal.         Result Review      The following data was reviewed by: CON Hull on 10/19/2021:    Results for orders placed or performed in visit on 10/19/21   POCT SARS-CoV-2 Antigen BRENDA + Flu    Specimen: Swab   Result Value Ref Range    SARS Antigen Detected (A) Not Detected    Influenza A Antigen BRENDA Not Detected     Influenza B Antigen BRENDA Not Detected     Internal Control Passed Passed    Lot Number [id number]     Expiration Date 10,052,022                Assessment and Plan:          Diagnoses and all orders for this visit:    1. Cough (Primary)  Assessment & Plan:  Flu screen negative     Orders:  -     POCT SARS-CoV-2 Antigen BRENDA + Flu  -     Cancel: XR Chest 2 View; Future    2. Chronic obstructive pulmonary disease, unspecified COPD type (HCC)  Assessment & Plan:  Continue current rx's and follow up with pulm as directed        3. COVID-19  Assessment & Plan:  covid +, not had vaccines, high risk for complications, was going to send over for CXR, but he prefers to go to [facility] for monoclonal antibody infusion, contacted house supervisor at [facility] and sent to ER   Gave a hand out and discussion on positive covid / quarantine and the monoclonal antibody infusion     Orders:  -     Cancel: XR Chest 2 View; Future        Follow Up   Return if symptoms worsen or fail to improve.  Patient was given instructions and counseling regarding his condition or for health maintenance advice. Please see specific information pulled into the AVS if appropriate.

## 2021-10-19 NOTE — ASSESSMENT & PLAN NOTE
covid +, not had vaccines, high risk for complications, was going to send over for CXR, but he prefers to go to [facility] for monoclonal antibody infusion, contacted house supervisor at [facility] and sent to ER   Gave a hand out and discussion on positive covid / quarantine and the monoclonal antibody infusion

## 2021-10-19 NOTE — TELEPHONE ENCOUNTER
HUB UNABLE TO WARM TRANSFER    Caller: INGA URIARTE    Relationship to patient: Emergency Contact    Best call back number: [phone]    Patient is needing: PATIENT'S WIFE CALLED IN BECAUSE SHE AND HER  HAVE BEEN EXPOSED TO COVID.  THEY ARE NOW SICK AND HER  HAS COPD AND IS CONGESTED.  THEY NEED AN APPOINTMENT TOGETHER SINCE HE IS IN A WHEELCHAIR.    PLEASE CALL INGA AND SCHEDULE A SAME DAY APPOINTMENT ASAP.

## 2021-10-23 DIAGNOSIS — K59.09 OTHER CONSTIPATION: ICD-10-CM

## 2021-10-25 DIAGNOSIS — J44.9 CHRONIC OBSTRUCTIVE PULMONARY DISEASE, UNSPECIFIED COPD TYPE (HCC): Primary | ICD-10-CM

## 2021-10-25 RX ORDER — TOBRAMYCIN INHALATION 300 MG/4ML
SOLUTION RESPIRATORY (INHALATION)
Qty: 224 ML | OUTPATIENT
Start: 2021-10-25

## 2021-10-25 RX ORDER — TOBRAMYCIN INHALATION SOLUTION 300 MG/5ML
300 INHALANT RESPIRATORY (INHALATION) 2 TIMES DAILY
Qty: 5 ML | Refills: 3 | Status: SHIPPED | OUTPATIENT
Start: 2021-10-25 | End: 2022-04-05

## 2021-10-25 RX ORDER — TOBRAMYCIN INHALATION SOLUTION 300 MG/5ML
300 INHALANT RESPIRATORY (INHALATION) 2 TIMES DAILY
COMMUNITY
Start: 2021-08-23 | End: 2021-10-25 | Stop reason: SDUPTHER

## 2021-10-25 RX ORDER — DOCUSATE SODIUM 100 MG/1
CAPSULE, LIQUID FILLED ORAL
Qty: 180 CAPSULE | Refills: 1 | Status: SHIPPED | OUTPATIENT
Start: 2021-10-25 | End: 2022-04-04

## 2021-10-25 RX ORDER — TOBRAMYCIN INHALATION SOLUTION 300 MG/5ML
300 INHALANT RESPIRATORY (INHALATION) 2 TIMES DAILY
OUTPATIENT
Start: 2021-10-25

## 2021-10-30 DIAGNOSIS — E78.5 HYPERLIPIDEMIA, UNSPECIFIED HYPERLIPIDEMIA TYPE: ICD-10-CM

## 2021-11-01 ENCOUNTER — OFFICE VISIT (OUTPATIENT)
Dept: FAMILY MEDICINE CLINIC | Age: 56
End: 2021-11-01

## 2021-11-01 VITALS
HEIGHT: 69 IN | OXYGEN SATURATION: 97 % | BODY MASS INDEX: 35.25 KG/M2 | WEIGHT: 238 LBS | HEART RATE: 71 BPM | DIASTOLIC BLOOD PRESSURE: 50 MMHG | SYSTOLIC BLOOD PRESSURE: 118 MMHG

## 2021-11-01 DIAGNOSIS — E11.65 TYPE 2 DIABETES MELLITUS WITH HYPERGLYCEMIA, WITH LONG-TERM CURRENT USE OF INSULIN (HCC): ICD-10-CM

## 2021-11-01 DIAGNOSIS — U07.1 PNEUMONIA DUE TO COVID-19 VIRUS: Primary | ICD-10-CM

## 2021-11-01 DIAGNOSIS — U07.1 DEEP VEIN THROMBOSIS (DVT) ASSOCIATED WITH COVID-19: ICD-10-CM

## 2021-11-01 DIAGNOSIS — I82.90 DEEP VEIN THROMBOSIS (DVT) ASSOCIATED WITH COVID-19: ICD-10-CM

## 2021-11-01 DIAGNOSIS — Z79.4 TYPE 2 DIABETES MELLITUS WITH HYPERGLYCEMIA, WITH LONG-TERM CURRENT USE OF INSULIN (HCC): ICD-10-CM

## 2021-11-01 DIAGNOSIS — J12.82 PNEUMONIA DUE TO COVID-19 VIRUS: Primary | ICD-10-CM

## 2021-11-01 DIAGNOSIS — I10 ESSENTIAL HYPERTENSION: ICD-10-CM

## 2021-11-01 DIAGNOSIS — G62.9 PERIPHERAL POLYNEUROPATHY: ICD-10-CM

## 2021-11-01 PROCEDURE — 87205 SMEAR GRAM STAIN: CPT | Performed by: FAMILY MEDICINE

## 2021-11-01 PROCEDURE — 87147 CULTURE TYPE IMMUNOLOGIC: CPT | Performed by: FAMILY MEDICINE

## 2021-11-01 PROCEDURE — 87186 SC STD MICRODIL/AGAR DIL: CPT | Performed by: FAMILY MEDICINE

## 2021-11-01 PROCEDURE — 1111F DSCHRG MED/CURRENT MED MERGE: CPT | Performed by: FAMILY MEDICINE

## 2021-11-01 PROCEDURE — 99214 OFFICE O/P EST MOD 30 MIN: CPT | Performed by: FAMILY MEDICINE

## 2021-11-01 PROCEDURE — 87070 CULTURE OTHR SPECIMN AEROBIC: CPT | Performed by: FAMILY MEDICINE

## 2021-11-01 RX ORDER — LEVALBUTEROL TARTRATE 45 UG/1
1-2 AEROSOL, METERED ORAL EVERY 4 HOURS PRN
COMMUNITY
End: 2022-04-05

## 2021-11-01 RX ORDER — TRAZODONE HYDROCHLORIDE 50 MG/1
TABLET ORAL
Qty: 90 TABLET | Refills: 0 | Status: SHIPPED | OUTPATIENT
Start: 2021-11-01 | End: 2022-03-17

## 2021-11-01 RX ORDER — IRON POLYSACCHARIDE COMPLEX 150 MG
150 CAPSULE ORAL 2 TIMES DAILY
COMMUNITY
End: 2021-11-29 | Stop reason: SDUPTHER

## 2021-11-01 RX ORDER — ATORVASTATIN CALCIUM 20 MG/1
TABLET, FILM COATED ORAL
Qty: 90 TABLET | Refills: 0 | Status: SHIPPED | OUTPATIENT
Start: 2021-11-01 | End: 2022-03-17 | Stop reason: SDUPTHER

## 2021-11-01 RX ORDER — FUROSEMIDE 40 MG/1
TABLET ORAL
Qty: 90 TABLET | Refills: 0 | Status: SHIPPED | OUTPATIENT
Start: 2021-11-01 | End: 2022-02-04

## 2021-11-01 RX ORDER — FAMOTIDINE 40 MG/1
TABLET, FILM COATED ORAL
Qty: 90 TABLET | Refills: 0 | Status: SHIPPED | OUTPATIENT
Start: 2021-11-01 | End: 2022-03-17 | Stop reason: SDUPTHER

## 2021-11-01 NOTE — PROGRESS NOTES
Preston Wallis presents to [facility].    Chief Complaint:hospitalization for covid    Subjective       History of Present Illness:  HPI Mr. Wallis is being seen today for recent hospitalization from 10/19/2021 to 10/21/2021 for bilateral Covid pneumonia.  He has chronic lung disease with intermittent pseudomonal infections and does see a pulmonologist routinely.  He was admitted to the hospital when he tested positive for Covid and had significant shortness of air.  He was treated with IV Decadron, IV remdesivir,baricitinib, and antioxidants.  He was found to be in acute on chronic hypoxic respiratory failure with hypercapnia and was stable on 2 L/min nasal cannula oxygen patient states he was actually in the ICU for 2 days and then the regular floor for 1 day.  He was also diagnosed with a DVT right lower extremity and he was taking Eliquis when this occurred and it was recommended that he increase his Eliquis to 10 mg twice a day and patient has not done this yet.  He has poorly controlled diabetes type 2 and his hemoglobin A1c in the hospital was 9.1%.  Patient takes his medication but he is significantly noncompliant with diet, exercise, and checking his sugars.  He was also found to have stage III acute kidney injury on chronic kidney disease which improved with IV hydration.  His iron levels were low and he does have known iron deficiency anemia.  He was on 325 mg of iron daily and this was changed to polyiron 150 mg p.o. twice a day he was discharged home in good condition.  Discharge medications include polyiron 150 mg twice a day duo nebs Ventolin Bydureon pen weekly atorvastatin cardia metoprolol hydralazine trazodone Lasix insulin glargine gabapentin 300 mg at bedtime Cymbalta 60 mg twice a day and Colace 100 mg twice a day    Review of Systems:  Review of Systems   Constitutional: Positive for fatigue. Negative for chills and fever.   HENT: Positive for congestion.     Respiratory: Positive for cough, shortness of breath and wheezing.    Cardiovascular: Positive for leg swelling. Negative for chest pain and palpitations.   Gastrointestinal: Negative for abdominal pain, constipation, diarrhea, nausea, vomiting and GERD.   Genitourinary: Negative for flank pain.   Neurological: Negative for dizziness and headache.   Psychiatric/Behavioral: Negative for depressed mood.        Objective   Medical History:  Past Medical History:   • Age-related cognitive decline   • Allergic contact dermatitis   • Allergies   • Anemia   • Bronchiectasis with acute lower respiratory infection ([facility])   • Chest pain   • Chronic bronchitis ([facility])   • Chronic diastolic (congestive) heart failure ([facility])   • Chronic kidney disease   • Chronic respiratory failure with hypoxia ([facility])   • COPD (chronic obstructive pulmonary disease) ([facility])   • COPD with acute exacerbation ([facility])   • Cough   • Dependence on supplemental oxygen   • Eczema   • Erectile dysfunction    due to organic reasons   • Essential (primary) hypertension   • Fracture    closed fracture of other tarsal and metatarsal bones   • GERD without esophagitis   • High risk medication use   • Hypercholesteremia   • Hypomagnesemia   • Insomnia   • Low back pain   • Major depressive disorder   • Major depressive disorder   • Morbid (severe) obesity due to excess calories ([facility])   • MRSA pneumonia ([facility])   • Muscle weakness   • 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 without evidence of necrosis ([facility])   • Obstructive sleep apnea (adult) (pediatric)   • Other forms of dyspnea   • Other long term (current) drug therapy   • Other pulmonary embolism without acute cor pulmonale ([facility])   • Other specified noninfective gastroenteritis and colitis   • Other spondylosis, lumbar region   • Pain in both knees   • Paroxysmal atrial fibrillation ([facility])   • Peripheral neuropathy    attributed to type 2 diabetes   • Pneumonia,  unspecified organism   • Polyneuropathy   • Rash and other nonspecific skin eruption   • Syncope and collapse   • Tachycardia   • Type 1 diabetes mellitus with diabetic chronic kidney disease (HCC)   • Type 2 diabetes mellitus (HCC)   • Unspecified fall, initial encounter     Past Surgical History:   • CHOLECYSTECTOMY   • KNEE SURGERY   • OTHER SURGICAL HISTORY    venous port   • TONSILLECTOMY AND ADENOIDECTOMY      Family History   Problem Relation Age of Onset   • Coronary artery disease Mother    • Hypertension Mother    • Diabetes type II Mother    • Asthma Father    • Cancer Sister      Social History     Tobacco Use   • Smoking status: Former Smoker     Packs/day: 1.00     Years: 6.00     Pack years: 6.00     Types: Cigarettes     Quit date:      Years since quittin.8   • Smokeless tobacco: Never Used   Substance Use Topics   • Alcohol use: Not Currently       Health Maintenance Due   Topic Date Due   • COLORECTAL CANCER SCREENING  Never done   • ANNUAL PHYSICAL  Never done   • COVID-19 Vaccine (1) Never done   • Hepatitis B (1 of 3 - Risk 3-dose series) Never done   • ZOSTER VACCINE (1 of 2) Never done   • DIABETIC FOOT EXAM  Never done   • HEMOGLOBIN A1C  2021   • DIABETIC EYE EXAM  2021   • INFLUENZA VACCINE  2021        Immunization History   Administered Date(s) Administered   • Flu Vaccine Quad PF >36MO 10/18/2016, 10/16/2017, 2019   • Influenza Quad Vaccine (Inpatient) 2013   • Influenza, Unspecified 2020   • Pneumococcal Polysaccharide (PPSV23) 1997   • Tdap 2018       Allergies   Allergen Reactions   • Benadryl [Diphenhydramine] Itching   • Proventil [Albuterol] Other (See Comments)     Mouth sores          Medications:  Current Outpatient Medications on File Prior to Visit   Medication Sig   • albuterol sulfate  (90 Base) MCG/ACT inhaler Inhale 2 puffs 4 (Four) Times a Day As Needed.   • aspirin 81 MG EC tablet Take 81 mg by mouth Daily.    • B-D UF III MINI PEN NEEDLES 31G X 5 MM misc USE AS DIRECTED WITH INSULIN   • Bydureon BCise 2 MG/0.85ML auto-injector injection INJECT 2 MG SUBCUTANEOUSLY EVERY WEEK   • calcium citrate-vitamin d (CITRACAL) 200-250 MG-UNIT tablet tablet Take 1 tablet by mouth Daily.   • dilTIAZem CD (CARDIZEM CD) 120 MG 24 hr capsule TAKE 1 CAPSULE BY MOUTH EVERY DAY   • docusate sodium (COLACE) 100 MG capsule TAKE 1 CAPSULE BY MOUTH TWICE DAILY   • DULoxetine (CYMBALTA) 60 MG capsule TAKE 1 CAPSULE BY MOUTH TWICE DAILY   • Eliquis 5 MG tablet tablet TAKE 1 TABLET BY MOUTH TWICE DAILY   • gabapentin (NEURONTIN) 300 MG capsule Take 1 capsule by mouth every night at bedtime.   • hydrALAZINE (APRESOLINE) 25 MG tablet TAKE 1 TABLET (25 MG) BY ORAL ROUTE 2 TIMES PER DAY WITH FOOD   • Insulin Glargine (BASAGLAR KWIKPEN) 100 UNIT/ML injection pen INJECT 40 UNITS UNDER THE SKIN ONCE DAILY   • Insulin Lispro (ADMELOG SOLOSTAR SC) Inject  under the skin into the appropriate area as directed. Per SSI, if BS <150 = 0 units, 151-180= 1 unit, 181-210= 2units, 211-240= 3units, 241-270= 4units, 271-300= 5units, 301-330=6 untis, 331-390= 8units, <390 give 9units   • ipratropium (ATROVENT HFA) 17 MCG/ACT inhaler Inhale 2 puffs 4 (Four) Times a Day.   • ipratropium-albuterol (DUO-NEB) 0.5-2.5 mg/3 ml nebulizer Take 3 mL by nebulization Every 4 (Four) Hours As Needed.   • iron polysaccharides (NIFEREX) 150 MG capsule Take 150 mg by mouth 2 (Two) Times a Day.   • levalbuterol (XOPENEX HFA) 45 MCG/ACT inhaler Inhale 1-2 puffs Every 4 (Four) Hours As Needed for Wheezing.   • metoprolol tartrate (LOPRESSOR) 100 MG tablet TAKE 2 TABLETS BY MOUTH EVERY MORNING AND 1 TABLET IN THE EVENING   • mometasone-formoterol (DULERA 200) 200-5 MCG/ACT inhaler Inhale 2 puffs 2 (Two) Times a Day.   • tobramycin PF (MOIZ) 300 MG/5ML nebulizer solution Take 5 mL by nebulization 2 (two) times a day.   • TRUEplus Lancets 28G misc USE AS DIRECTED   • [DISCONTINUED]  "atorvastatin (LIPITOR) 20 MG tablet TAKE 1 TABLET BY MOUTH EVERY AT BEDTIME   • [DISCONTINUED] famotidine (PEPCID) 40 MG tablet TAKE 1 TABLET BY MOUTH EVERY DAY   • [DISCONTINUED] FeroSul 325 (65 Fe) MG tablet TAKE 1 TABLET BY MOUTH EVERY DAY   • [DISCONTINUED] furosemide (LASIX) 40 MG tablet TAKE 1 TABLET BY MOUTH EVERY DAY   • [DISCONTINUED] traZODone (DESYREL) 50 MG tablet TAKE 1 TABLET BY MOUTH AT BEDTIME   • atorvastatin (LIPITOR) 20 MG tablet TAKE 1 TABLET BY MOUTH EACH NIGHT AT BEDTIME   • budesonide-formoterol (Symbicort) 160-4.5 MCG/ACT inhaler Inhale 2 puffs 2 (two) times a day.   • Diclofenac Sodium (VOLTAREN) 1 % gel gel    • famotidine (PEPCID) 40 MG tablet TAKE 1 TABLET BY MOUTH EVERY DAY   • furosemide (LASIX) 40 MG tablet TAKE 1 TABLET BY MOUTH EVERY DAY   • magnesium oxide (MAGOX) 400 (241.3 Mg) MG tablet tablet Take 400 mg by mouth 3 (Three) Times a Day.   • traZODone (DESYREL) 50 MG tablet TAKE 1 TABLET BY MOUTH AT BEDTIME   • [DISCONTINUED] Calcitrate 950 MG tablet TAKE 1 TABLET BY MOUTH EVERY DAY   • [DISCONTINUED] omeprazole (priLOSEC) 40 MG capsule Take 40 mg by mouth Daily.     No current facility-administered medications on file prior to visit.       Vital Signs:   /50 (BP Location: Right arm, Patient Position: Sitting, Cuff Size: Large Adult)   Pulse 71   Ht 175.3 cm (69\")   Wt 108 kg (238 lb)   SpO2 97% Comment: room air  BMI 35.15 kg/m²       Physical Exam:  Physical Exam  Vitals and nursing note reviewed.   Constitutional:       General: He is not in acute distress.     Appearance: Normal appearance. He is not ill-appearing, toxic-appearing or diaphoretic.   HENT:      Head: Normocephalic and atraumatic.      Right Ear: Tympanic membrane, ear canal and external ear normal.      Left Ear: Tympanic membrane, ear canal and external ear normal.      Nose: No congestion or rhinorrhea.      Mouth/Throat:      Pharynx: Oropharynx is clear. No oropharyngeal exudate or posterior " oropharyngeal erythema.   Eyes:      Extraocular Movements: Extraocular movements intact.      Conjunctiva/sclera: Conjunctivae normal.   Cardiovascular:      Rate and Rhythm: Normal rate and regular rhythm.      Heart sounds: Normal heart sounds.   Pulmonary:      Effort: Pulmonary effort is normal.      Breath sounds: Examination of the right-upper field reveals wheezing and rhonchi. Examination of the left-upper field reveals wheezing and rhonchi. Examination of the right-middle field reveals rhonchi. Examination of the left-middle field reveals rhonchi. Examination of the right-lower field reveals rhonchi. Examination of the left-lower field reveals rhonchi. Wheezing and rhonchi present. No rales.   Abdominal:      General: Abdomen is flat.      Palpations: Abdomen is soft.   Musculoskeletal:      Cervical back: Neck supple. No rigidity.   Lymphadenopathy:      Cervical: No cervical adenopathy.   Skin:     General: Skin is warm and dry.   Neurological:      Mental Status: He is alert and oriented to person, place, and time.   Psychiatric:         Mood and Affect: Mood normal.         Behavior: Behavior normal.         Result Review      The following data was reviewed by Kimmy Riley MD on 11/01/2021.  Lab Results   Component Value Date    WBC 10.79 10/13/2020    HGB 11.30 (L) 10/13/2020    HCT 33.5 (L) 10/13/2020    MCV 82.3 10/13/2020    .00 (H) 10/13/2020     Lab Results   Component Value Date    GLUCOSE 402 (C) 07/27/2021    BUN 15 07/27/2021    CREATININE 1.58 (H) 07/27/2021    EGFRIFNONA 46 (L) 07/27/2021    BCR 9.5 07/27/2021    K 3.9 07/27/2021    CO2 28.6 07/27/2021    CALCIUM 9.4 07/27/2021    ALBUMIN 3.40 (L) 07/27/2021    LABIL2 0.9 (L) 09/30/2020    AST 15 07/27/2021    ALT 12 07/27/2021     Lab Results   Component Value Date    CHOL 183 07/27/2021    CHLPL 165 08/26/2020    TRIG 173 (H) 07/27/2021    HDL 38 (L) 07/27/2021     (H) 07/27/2021     Lab Results   Component Value  Date    TSH 0.580 03/17/2020     Lab Results   Component Value Date    HGBA1C 11.1 (H) 08/26/2020     No results found for: PSA                    Assessment and Plan:          Diagnoses and all orders for this visit:    1. Pneumonia due to COVID-19 virus (Primary)  Comments:  He has improved significantly.  Oxygen level 97%.  Continue 2 L of O2 and will send sputum in for culture  Orders:  -     Respiratory Culture - Sputum, Lung, Right Lower Lobe; Future  -     US Ankle / Brachial Indices Extremity Complete; Future  -     Respiratory Culture - Sputum, Lung, Right Lower Lobe    2. Deep vein thrombosis (DVT) associated with COVID-19  Comments:  Increase Eliquis 5 mg to 1-1/2 pills twice daily x10 days then go back to 1 p.o. twice daily KEYLA was ordered for further assessment  Orders:  -     US Ankle / Brachial Indices Extremity Complete; Future    3. Peripheral polyneuropathy  Comments:  Recommend he increase his gabapentin to 300 mg 2 pills nightly  Orders:  -     US Ankle / Brachial Indices Extremity Complete; Future    4. Essential hypertension  Comments:  Stable and well-controlled    5. Type 2 diabetes mellitus with hyperglycemia, with long-term current use of insulin (HCC)  Comments:  Poorly compliant.  I have asked patient to keep a blood sugar diary for the next 2 weeks for me so I can better manage his blood sugars and medications          Follow Up   No follow-ups on file.

## 2021-11-02 ENCOUNTER — TELEPHONE (OUTPATIENT)
Dept: PULMONOLOGY | Facility: CLINIC | Age: 56
End: 2021-11-02

## 2021-11-02 NOTE — TELEPHONE ENCOUNTER
[facility] for [facility]. [facility] had [facility] over a refill request and someonr for this office denied. Evelino was asking was it D/C. I looked Mr. Wallis and he has not been seen since 12/2020 by Betzaida and he has not a follow up appointment with Dr. Carolina. I spoke with Dr. Carolina and he advised Mr. Wallis needed a follow up before refill.

## 2021-11-04 ENCOUNTER — TELEPHONE (OUTPATIENT)
Dept: FAMILY MEDICINE CLINIC | Age: 56
End: 2021-11-04

## 2021-11-04 DIAGNOSIS — J15.212 PNEUMONIA DUE TO METHICILLIN RESISTANT STAPHYLOCOCCUS AUREUS (MRSA), UNSPECIFIED LATERALITY, UNSPECIFIED PART OF LUNG (HCC): Primary | ICD-10-CM

## 2021-11-04 LAB
BACTERIA SPEC RESP CULT: ABNORMAL
BACTERIA SPEC RESP CULT: ABNORMAL
GRAM STN SPEC: ABNORMAL

## 2021-11-04 RX ORDER — SULFAMETHOXAZOLE AND TRIMETHOPRIM 800; 160 MG/1; MG/1
1 TABLET ORAL 2 TIMES DAILY
Qty: 20 TABLET | Refills: 0 | Status: SHIPPED | OUTPATIENT
Start: 2021-11-04 | End: 2022-02-28

## 2021-11-05 RX ORDER — DILTIAZEM HYDROCHLORIDE 120 MG/1
CAPSULE, COATED, EXTENDED RELEASE ORAL
Qty: 30 CAPSULE | Refills: 0 | Status: SHIPPED | OUTPATIENT
Start: 2021-11-05 | End: 2021-11-29

## 2021-11-05 RX ORDER — CALCIUM CITRATE 200 MG (950 MG) TABLET 200(950)MG
TABLET ORAL
Qty: 90 TABLET | Refills: 0 | OUTPATIENT
Start: 2021-11-05

## 2021-11-05 RX ORDER — METOPROLOL TARTRATE 100 MG/1
TABLET ORAL
Qty: 90 TABLET | Refills: 0 | Status: SHIPPED | OUTPATIENT
Start: 2021-11-05 | End: 2021-11-29

## 2021-11-08 DIAGNOSIS — M79.604 PAIN IN BOTH LOWER EXTREMITIES: Primary | ICD-10-CM

## 2021-11-08 DIAGNOSIS — M79.605 PAIN IN BOTH LOWER EXTREMITIES: Primary | ICD-10-CM

## 2021-11-11 RX ORDER — INSULIN GLARGINE 100 [IU]/ML
INJECTION, SOLUTION SUBCUTANEOUS
Qty: 15 ML | Refills: 1 | Status: SHIPPED | OUTPATIENT
Start: 2021-11-11 | End: 2021-11-16

## 2021-11-12 ENCOUNTER — PATIENT EDUCATION (SURGERY INSTRUCTIONS) (OUTPATIENT)
Dept: FAMILY MEDICINE CLINIC | Age: 56
End: 2021-11-12

## 2021-11-12 ENCOUNTER — TELEPHONE (OUTPATIENT)
Dept: FAMILY MEDICINE CLINIC | Age: 56
End: 2021-11-12

## 2021-11-12 RX ORDER — CALCIUM CITRATE 200 MG (950 MG) TABLET 200(950)MG
TABLET ORAL
Qty: 90 TABLET | Refills: 0 | Status: SHIPPED | OUTPATIENT
Start: 2021-11-12 | End: 2022-04-05

## 2021-11-24 ENCOUNTER — APPOINTMENT (OUTPATIENT)
Dept: CARDIOLOGY | Facility: HOSPITAL | Age: 56
End: 2021-11-24

## 2021-11-29 RX ORDER — IRON POLYSACCHARIDE COMPLEX 150 MG
150 CAPSULE ORAL DAILY
Qty: 90 CAPSULE | Refills: 0 | Status: SHIPPED | OUTPATIENT
Start: 2021-11-29 | End: 2022-04-05

## 2021-11-29 RX ORDER — METOPROLOL TARTRATE 100 MG/1
TABLET ORAL
Qty: 90 TABLET | Refills: 0 | Status: SHIPPED | OUTPATIENT
Start: 2021-11-29 | End: 2022-03-17 | Stop reason: SDUPTHER

## 2021-11-29 RX ORDER — DILTIAZEM HYDROCHLORIDE 120 MG/1
CAPSULE, COATED, EXTENDED RELEASE ORAL
Qty: 30 CAPSULE | Refills: 0 | Status: SHIPPED | OUTPATIENT
Start: 2021-11-29 | End: 2022-02-04 | Stop reason: SDUPTHER

## 2021-12-10 DIAGNOSIS — Z79.4 TYPE 2 DIABETES MELLITUS WITH HYPERGLYCEMIA, WITH LONG-TERM CURRENT USE OF INSULIN (HCC): Primary | ICD-10-CM

## 2021-12-10 DIAGNOSIS — E11.65 TYPE 2 DIABETES MELLITUS WITH HYPERGLYCEMIA, WITH LONG-TERM CURRENT USE OF INSULIN (HCC): Primary | ICD-10-CM

## 2021-12-15 DIAGNOSIS — G89.29 CHRONIC BILATERAL LOW BACK PAIN, UNSPECIFIED WHETHER SCIATICA PRESENT: ICD-10-CM

## 2021-12-15 DIAGNOSIS — M54.50 CHRONIC BILATERAL LOW BACK PAIN, UNSPECIFIED WHETHER SCIATICA PRESENT: ICD-10-CM

## 2021-12-15 RX ORDER — GABAPENTIN 300 MG/1
300 CAPSULE ORAL
Qty: 90 CAPSULE | Refills: 0 | Status: SHIPPED | OUTPATIENT
Start: 2021-12-15 | End: 2022-06-09 | Stop reason: SDUPTHER

## 2021-12-15 NOTE — TELEPHONE ENCOUNTER
Caller: INGA URIARTE    Relationship: Emergency Contact    Best call back number: [phone]    Requested Prescriptions:   Requested Prescriptions     Pending Prescriptions Disp Refills   • gabapentin (NEURONTIN) 300 MG capsule 90 capsule 0     Sig: Take 1 capsule by mouth every night at bedtime.        Pharmacy where request should be sent:    [facility] [address] [place]  - [phone]  - [phone]   [phone]  Associate Signed OrdersPatient EstimateProvidersCurrent Interactions          Additional details provided by patient:     Does the patient have less than a 3 day supply:  [x] Yes  [] No    Hilda Breen Rep   12/15/21 11:58 EST

## 2021-12-22 ENCOUNTER — TELEPHONE (OUTPATIENT)
Dept: FAMILY MEDICINE CLINIC | Age: 56
End: 2021-12-22

## 2021-12-22 NOTE — TELEPHONE ENCOUNTER
Caller: INGA URIARTE    Relationship to patient: Emergency Contact    Best call back number: [phone]    Patient is needing: PATIENTS WIFE CALLED STATING SHE JUST WANTED TO MAKE SURE THE PATIENTS PCP AND HER NURSE KNEW ABOUT THE PATIENT BEING IN [facility] DUE TO HIM BREAKING HIS LEG AND IS SUPPOSED TO BE GETTING SURGERY ON IT TODAY. SHE STATED HE IS STILL IN THE ER AT THIS MOMENT AND JUST WANTED TO LET HER KNOW. PLEASE ADVISE THANK YOU.

## 2021-12-29 RX ORDER — DULOXETIN HYDROCHLORIDE 60 MG/1
CAPSULE, DELAYED RELEASE ORAL
Qty: 60 CAPSULE | Refills: 1 | OUTPATIENT
Start: 2021-12-29

## 2022-01-04 RX ORDER — EXENATIDE 2 MG/.85ML
INJECTION, SUSPENSION, EXTENDED RELEASE SUBCUTANEOUS
Qty: 4 ML | Refills: 2 | OUTPATIENT
Start: 2022-01-04

## 2022-01-06 RX ORDER — FAMOTIDINE 40 MG/1
TABLET, FILM COATED ORAL
Qty: 90 TABLET | Refills: 1 | OUTPATIENT
Start: 2022-01-06

## 2022-01-06 RX ORDER — HYDRALAZINE HYDROCHLORIDE 25 MG/1
TABLET, FILM COATED ORAL
Qty: 180 TABLET | Refills: 1 | OUTPATIENT
Start: 2022-01-06

## 2022-01-25 ENCOUNTER — READMISSION MANAGEMENT (OUTPATIENT)
Dept: CALL CENTER | Facility: HOSPITAL | Age: 57
End: 2022-01-25

## 2022-01-25 ENCOUNTER — TELEPHONE (OUTPATIENT)
Dept: FAMILY MEDICINE CLINIC | Age: 57
End: 2022-01-25

## 2022-01-25 NOTE — OUTREACH NOTE
Prep Survey      Responses   [facility] facility patient discharged from? Non-BH   Is LACE score < 7 ? Non-BH Discharge   Emergency Room discharge w/ pulse ox? No   Eligibility [facility]   [facility]   Date of Discharge 01/26/22   Discharge diagnosis unavailable   Does the patient have one of the following disease processes/diagnoses(primary or secondary)? Other   Prep survey completed? Yes          Jesica Osman RN

## 2022-01-25 NOTE — TELEPHONE ENCOUNTER
Caller: MRS CAROLINA    Relationship to patient: [facility]  Best call back number: [phone]    New or established patient?  [] New  [x] Established    Date of discharge: 01/26/21    Facility discharged from:  [facility]

## 2022-01-27 ENCOUNTER — TRANSITIONAL CARE MANAGEMENT TELEPHONE ENCOUNTER (OUTPATIENT)
Dept: CALL CENTER | Facility: HOSPITAL | Age: 57
End: 2022-01-27

## 2022-01-27 ENCOUNTER — TELEPHONE (OUTPATIENT)
Dept: FAMILY MEDICINE CLINIC | Age: 57
End: 2022-01-27

## 2022-01-27 NOTE — TELEPHONE ENCOUNTER
Caller: [facility]    Best call back number: [phone]    Who are you requesting to speak with (clinical staff, provider,  specific staff member): JANNA    What was the call regarding: THEY WANTED TO UPDATE OFFICE THAT THEY WILL BE PICKING UP PATIENT AND STARTING CARE. IF THERE ARE ANY CONFLICTS, PLEASE CALL [facility].

## 2022-01-27 NOTE — OUTREACH NOTE
Call Center TCM Note      Responses   [facility] patient discharged from? Non-   Does the patient have one of the following disease processes/diagnoses(primary or secondary)? Other   TCM attempt successful? Yes  [Kami spouse on verbal release ]   Call start time 1007   Call end time 1008   Discharge diagnosis unavailable   Meds reviewed with patient/caregiver? Yes   Is the patient having any side effects they believe may be caused by any medication additions or changes? No   Does the patient have all medications ordered at discharge? N/A   Is the patient taking all medications as directed (includes completed medication regime)? Yes   Does the patient have a primary care provider?  Yes   Does the patient have an appointment with their PCP within 7 days of discharge? Yes   Comments regarding PCP [facility] on 1-31-22    Has the patient kept scheduled appointments due by today? N/A   Psychosocial issues? No   Did the patient receive a copy of their discharge instructions? Yes   Nursing interventions Reviewed instructions with patient   What is the patient's perception of their health status since discharge? Improving   Is the patient/caregiver able to teach back signs and symptoms related to disease process for when to call PCP? Yes   Is the patient/caregiver able to teach back signs and symptoms related to disease process for when to call 911? Yes   Is the patient/caregiver able to teach back the hierarchy of who to call/visit for symptoms/problems? PCP, Specialist, Home health nurse, Urgent Care, ED, 911 Yes   If the patient is a current smoker, are they able to teach back resources for cessation? Not a smoker   TCM call completed? Yes          Herminia Ray RN    1/27/2022, 10:09 EST

## 2022-01-31 ENCOUNTER — OFFICE VISIT (OUTPATIENT)
Dept: FAMILY MEDICINE CLINIC | Age: 57
End: 2022-01-31

## 2022-01-31 VITALS
OXYGEN SATURATION: 90 % | HEART RATE: 67 BPM | DIASTOLIC BLOOD PRESSURE: 48 MMHG | WEIGHT: 248 LBS | SYSTOLIC BLOOD PRESSURE: 124 MMHG | BODY MASS INDEX: 36.73 KG/M2 | HEIGHT: 69 IN

## 2022-01-31 DIAGNOSIS — E11.65 TYPE 2 DIABETES MELLITUS WITH HYPERGLYCEMIA, WITH LONG-TERM CURRENT USE OF INSULIN: ICD-10-CM

## 2022-01-31 DIAGNOSIS — S72.8X2D OTHER CLOSED FRACTURE OF LEFT FEMUR WITH ROUTINE HEALING, UNSPECIFIED PORTION OF FEMUR, SUBSEQUENT ENCOUNTER: Primary | ICD-10-CM

## 2022-01-31 DIAGNOSIS — Z79.4 TYPE 2 DIABETES MELLITUS WITH HYPERGLYCEMIA, WITH LONG-TERM CURRENT USE OF INSULIN: ICD-10-CM

## 2022-01-31 DIAGNOSIS — I10 ESSENTIAL HYPERTENSION: ICD-10-CM

## 2022-01-31 DIAGNOSIS — N18.32 STAGE 3B CHRONIC KIDNEY DISEASE: ICD-10-CM

## 2022-01-31 DIAGNOSIS — J44.9 CHRONIC OBSTRUCTIVE PULMONARY DISEASE, UNSPECIFIED COPD TYPE: ICD-10-CM

## 2022-01-31 DIAGNOSIS — Z79.01 ON CONTINUOUS ORAL ANTICOAGULATION: ICD-10-CM

## 2022-01-31 DIAGNOSIS — I50.32 CHRONIC DIASTOLIC (CONGESTIVE) HEART FAILURE: ICD-10-CM

## 2022-01-31 PROCEDURE — 1111F DSCHRG MED/CURRENT MED MERGE: CPT | Performed by: FAMILY MEDICINE

## 2022-01-31 PROCEDURE — 99214 OFFICE O/P EST MOD 30 MIN: CPT | Performed by: FAMILY MEDICINE

## 2022-01-31 RX ORDER — TAMSULOSIN HYDROCHLORIDE 0.4 MG/1
1 CAPSULE ORAL DAILY
COMMUNITY
End: 2022-02-08

## 2022-01-31 RX ORDER — OXYCODONE HYDROCHLORIDE 5 MG/1
5 CAPSULE ORAL EVERY 4 HOURS PRN
COMMUNITY
End: 2022-04-05

## 2022-01-31 RX ORDER — DAPAGLIFLOZIN 5 MG/1
5 TABLET, FILM COATED ORAL DAILY
COMMUNITY
End: 2022-02-28 | Stop reason: SDUPTHER

## 2022-01-31 RX ORDER — ACETAMINOPHEN 500 MG
1000 TABLET ORAL EVERY 8 HOURS PRN
COMMUNITY
Start: 2021-12-30 | End: 2022-06-08 | Stop reason: ALTCHOICE

## 2022-01-31 NOTE — PROGRESS NOTES
Preston Wallis presents to [facility].    Chief Complaint: PRATIBHA from hospitalization for L femur fracture and NH admit for rehabd/c on 1/19/22 from [facility]    Subjective       History of Present Illness:  HPI   Patient is a 56-year-old male with a history of CHF, DVT, CKD, COPD, hypertension, diabetes mellitus, and hyperlipidemia who presents with left leg pain status post recent surgery onset yesterday. Patient reports that he was seen in the ED on 12/22 for a fracture to his LLE and received a surgical repair with judith and pins L femur. Post-op, he had worsening ERIC which was found to be due to a neurogenic bladder likely from DM. With IVF and lopez catheter, ERIC resolved. Started on Tamsulosin which should be continued after discharge.   Hb low prior to surgery, did require blood transfusion. Hb stable thereafter.   DM NP was consulted for DM management, changes made to DM regimen and reflected in discharge medications.   BP medications titrated as well, will likely need further titration as OP   Con't Apixaban for DVT hx   He is doing well with pain on hydrocodone 5 every 8 hours and tylenol in between.  He ended up going to the ER last week due a red shin but was evaluated and told all was ok.  He is on atorvastatin, aspirin, albuterol, dapagliflozin, apixaban, metoprolol, gabapentin, and insulin. Allergic to Benadryl and Proventil. Patient  Went to [facility] for a month and is now back home and has home health for PT/OT.  BM are normal.  He is eating well.   Denies CP, SOA, n/v/d, fever, dizziness, hitting head, or LOC. Also noted at [facility], patient is COVID positive. He has CHF.   His echo in the hospital showed Left ventricular global and regional systolic function is normal. Calculated left ventricular ejection fraction of 53 % (Single plane Dick'smethod). The right ventricle is normal in size and function.  He is on eliquis blood thinner due to DVT secondary to  Covid  Pt chronically on O2 for COPD, may also have chronic changes related to recent COVID infection. Remained stable on 2L NC.        PMH: COPD on 2L home O2, MILADY, obesity, CKD (baseline unclear), T2DM requiring insulin c/b peripheral neuropathy, HTN, HLD, CHF, DVT R thigh on Eliquis  PSH: L knee sx, exodontias, B cataract repairs, umbilical hernia repair, port L chest   SH: former smoker, quit 25-30yrs ago. Denies ETOH or drug use. Lives with wife and 2 sons in [place]. Disabled. Uses walker at baseline.          Review of Systems:  Review of Systems   Constitutional: Negative for chills, fatigue and fever.   HENT: Negative for congestion, ear discharge and sore throat.    Respiratory: Negative for shortness of breath.    Cardiovascular: Negative for chest pain.   Gastrointestinal: Negative for abdominal pain, constipation, diarrhea, nausea, vomiting and GERD.   Genitourinary: Negative for flank pain.   Neurological: Negative for dizziness and headache.   Psychiatric/Behavioral: Negative for depressed mood.        Objective   Medical History:  Past Medical History:   • Age-related cognitive decline   • Allergic contact dermatitis   • Allergies   • Anemia   • Bronchiectasis with acute lower respiratory infection (HCC)   • Chest pain   • Chronic bronchitis (HCC)   • Chronic diastolic (congestive) heart failure (HCC)   • Chronic kidney disease   • Chronic respiratory failure with hypoxia (HCC)   • COPD (chronic obstructive pulmonary disease) (HCC)   • COPD with acute exacerbation (HCC)   • Cough   • Dependence on supplemental oxygen   • Eczema   • Erectile dysfunction    due to organic reasons   • Essential (primary) hypertension   • Fracture    closed fracture of other tarsal and metatarsal bones   • GERD without esophagitis   • High risk medication use   • Hypercholesteremia   • Hypomagnesemia   • Insomnia   • Low back pain   • Major depressive disorder   • Major depressive disorder   • Morbid (severe) obesity  due to excess calories (HCC)   • MRSA pneumonia (HCC)   • Muscle weakness   • 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 without evidence of necrosis (HCC)   • Obstructive sleep apnea (adult) (pediatric)   • Other forms of dyspnea   • Other long term (current) drug therapy   • Other pulmonary embolism without acute cor pulmonale (HCC)   • Other specified noninfective gastroenteritis and colitis   • Other spondylosis, lumbar region   • Pain in both knees   • Paroxysmal atrial fibrillation (HCC)   • Peripheral neuropathy    attributed to type 2 diabetes   • Pneumonia, unspecified organism   • Polyneuropathy   • Rash and other nonspecific skin eruption   • Syncope and collapse   • Tachycardia   • Type 1 diabetes mellitus with diabetic chronic kidney disease (HCC)   • Type 2 diabetes mellitus (HCC)   • Unspecified fall, initial encounter     Past Surgical History:   • CHOLECYSTECTOMY   • KNEE SURGERY   • OTHER SURGICAL HISTORY    venous port   • TONSILLECTOMY AND ADENOIDECTOMY      Family History   Problem Relation Age of Onset   • Coronary artery disease Mother    • Hypertension Mother    • Diabetes type II Mother    • Asthma Father    • Cancer Sister      Social History     Tobacco Use   • Smoking status: Former Smoker     Packs/day: 1.00     Years: 6.00     Pack years: 6.00     Types: Cigarettes     Quit date:      Years since quittin.1   • Smokeless tobacco: Never Used   Substance Use Topics   • Alcohol use: Not Currently       Health Maintenance Due   Topic Date Due   • COLORECTAL CANCER SCREENING  Never done   • ANNUAL PHYSICAL  Never done   • COVID-19 Vaccine (1) Never done   • Hepatitis B (1 of 3 - Risk 3-dose series) Never done   • ZOSTER VACCINE (1 of 2) Never done   • DIABETIC FOOT EXAM  Never done   • HEMOGLOBIN A1C  2021   • DIABETIC EYE EXAM  2021   • INFLUENZA VACCINE  2021        Immunization History   Administered Date(s) Administered    • Flu Vaccine Quad PF >36MO 10/18/2016, 10/16/2017, 11/04/2019   • Influenza Quad Vaccine (Inpatient) 09/19/2013   • Influenza, Unspecified 09/21/2020   • Pneumococcal Polysaccharide (PPSV23) 11/20/1997   • Tdap 09/18/2018       Allergies   Allergen Reactions   • Benadryl [Diphenhydramine] Itching   • Proventil [Albuterol] Other (See Comments)     Mouth sores          Medications:  Current Outpatient Medications on File Prior to Visit   Medication Sig   • acetaminophen (TYLENOL) 500 MG tablet Take 1,000 mg by mouth Every 8 (Eight) Hours As Needed.   • albuterol sulfate  (90 Base) MCG/ACT inhaler Inhale 2 puffs 4 (Four) Times a Day As Needed.   • apixaban (Eliquis) 5 MG tablet tablet Take two tablets once  daily   • aspirin 81 MG EC tablet Take 81 mg by mouth Daily.   • atorvastatin (LIPITOR) 20 MG tablet TAKE 1 TABLET BY MOUTH EACH NIGHT AT BEDTIME   • B-D UF III MINI PEN NEEDLES 31G X 5 MM misc USE AS DIRECTED WITH INSULIN   • budesonide-formoterol (Symbicort) 160-4.5 MCG/ACT inhaler Inhale 2 puffs 2 (two) times a day.   • Bydureon BCise 2 MG/0.85ML auto-injector injection INJECT 2 MG SUBCUTANEOUSLY EVERY WEEK   • Calcitrate 950 (200 Ca) MG tablet TAKE 1 TABLET BY MOUTH EVERY DAY   • dapagliflozin (Farxiga) 5 MG tablet tablet Take 5 mg by mouth Daily.   • Diclofenac Sodium (VOLTAREN) 1 % gel gel    • dilTIAZem CD (CARDIZEM CD) 120 MG 24 hr capsule TAKE 1 CAPSULE BY MOUTH EVERY DAY   • docusate sodium (COLACE) 100 MG capsule TAKE 1 CAPSULE BY MOUTH TWICE DAILY   • DULoxetine (CYMBALTA) 60 MG capsule TAKE 1 CAPSULE BY MOUTH TWICE DAILY   • famotidine (PEPCID) 40 MG tablet TAKE 1 TABLET BY MOUTH EVERY DAY   • furosemide (LASIX) 40 MG tablet TAKE 1 TABLET BY MOUTH EVERY DAY   • gabapentin (NEURONTIN) 300 MG capsule Take 1 capsule by mouth every night at bedtime.   • hydrALAZINE (APRESOLINE) 25 MG tablet TAKE 1 TABLET (25 MG) BY ORAL ROUTE 2 TIMES PER DAY WITH FOOD   • Insulin Glargine (LANTUS SOLOSTAR) 100  "UNIT/ML injection pen Inject 40 Units under the skin into the appropriate area as directed Daily.   • Insulin Lispro (ADMELOG SOLOSTAR SC) Inject  under the skin into the appropriate area as directed. Per SSI, if BS <150 = 0 units, 151-180= 1 unit, 181-210= 2units, 211-240= 3units, 241-270= 4units, 271-300= 5units, 301-330=6 untis, 331-390= 8units, <390 give 9units   • ipratropium (ATROVENT HFA) 17 MCG/ACT inhaler Inhale 2 puffs 4 (Four) Times a Day.   • ipratropium-albuterol (DUO-NEB) 0.5-2.5 mg/3 ml nebulizer Take 3 mL by nebulization Every 4 (Four) Hours As Needed.   • iron polysaccharides (NIFEREX) 150 MG capsule Take 1 capsule by mouth Daily.   • levalbuterol (XOPENEX HFA) 45 MCG/ACT inhaler Inhale 1-2 puffs Every 4 (Four) Hours As Needed for Wheezing.   • magnesium oxide (MAGOX) 400 (241.3 Mg) MG tablet tablet Take 400 mg by mouth 3 (Three) Times a Day.   • metoprolol tartrate (LOPRESSOR) 100 MG tablet TAKE 2 TABLETS BY MOUTH EVERY MORNING AND 1 TABLET IN THE EVENING   • mometasone-formoterol (DULERA 200) 200-5 MCG/ACT inhaler Inhale 2 puffs 2 (Two) Times a Day.   • oxyCODONE (OXY-IR) 5 MG capsule Take 5 mg by mouth Every 4 (Four) Hours As Needed for Moderate Pain .   • sulfamethoxazole-trimethoprim (Bactrim DS) 800-160 MG per tablet Take 1 tablet by mouth 2 (Two) Times a Day.   • tamsulosin (FLOMAX) 0.4 MG capsule 24 hr capsule Take 1 capsule by mouth Daily.   • tobramycin PF (MOIZ) 300 MG/5ML nebulizer solution Take 5 mL by nebulization 2 (two) times a day.   • traZODone (DESYREL) 50 MG tablet TAKE 1 TABLET BY MOUTH AT BEDTIME   • TRUEplus Lancets 28G misc USE AS DIRECTED     No current facility-administered medications on file prior to visit.       Vital Signs:   /48 (BP Location: Left arm, Patient Position: Sitting, Cuff Size: Large Adult)   Pulse 67   Ht 175.3 cm (69\")   Wt 112 kg (248 lb)   SpO2 90% Comment: on 2l of oxygen  BMI 36.62 kg/m²       Physical Exam:  Physical Exam  Vitals and " nursing note reviewed.   Constitutional:       General: He is not in acute distress.     Appearance: Normal appearance. He is not ill-appearing, toxic-appearing or diaphoretic.   HENT:      Head: Normocephalic and atraumatic.      Right Ear: Tympanic membrane, ear canal and external ear normal.      Left Ear: Tympanic membrane, ear canal and external ear normal.      Nose: No congestion or rhinorrhea.      Mouth/Throat:      Mouth: Mucous membranes are moist.      Pharynx: Oropharynx is clear. No oropharyngeal exudate or posterior oropharyngeal erythema.   Eyes:      Extraocular Movements: Extraocular movements intact.      Conjunctiva/sclera: Conjunctivae normal.      Pupils: Pupils are equal, round, and reactive to light.   Cardiovascular:      Rate and Rhythm: Normal rate and regular rhythm.      Heart sounds: Normal heart sounds.   Pulmonary:      Effort: Pulmonary effort is normal.      Breath sounds: Normal breath sounds. No wheezing, rhonchi or rales.   Abdominal:      General: Abdomen is flat.      Palpations: Abdomen is soft.   Musculoskeletal:      Cervical back: Neck supple. No rigidity.   Lymphadenopathy:      Cervical: No cervical adenopathy.   Skin:     General: Skin is warm and dry.   Neurological:      Mental Status: He is alert and oriented to person, place, and time.   Psychiatric:         Mood and Affect: Mood normal.         Behavior: Behavior normal.         Result Review      The following data was reviewed by Kimmy Riley MD on 01/31/2022.  Lab Results   Component Value Date    WBC 12.34 (H) 01/02/2022    HGB 7.8 (L) 01/02/2022    HCT 24.9 (L) 01/02/2022    MCV 92.9 01/02/2022     (H) 01/02/2022     Lab Results   Component Value Date    GLUCOSE 402 (C) 07/27/2021    BUN 15 07/27/2021    CREATININE 1.58 (H) 07/27/2021    EGFRIFNONA 46 (L) 07/27/2021    BCR 9.5 07/27/2021    K 3.9 07/27/2021    CO2 28.6 07/27/2021    CALCIUM 9.4 07/27/2021    ALBUMIN 3.40 (L) 07/27/2021    LABIL2  0.9 (L) 09/30/2020    AST 15 07/27/2021    ALT 12 07/27/2021     Lab Results   Component Value Date    CHOL 183 07/27/2021    CHLPL 165 08/26/2020    TRIG 173 (H) 07/27/2021    HDL 38 (L) 07/27/2021     (H) 07/27/2021     Lab Results   Component Value Date    TSH 0.580 03/17/2020     Lab Results   Component Value Date    HGBA1C 11.1 (H) 08/26/2020     No results found for: PSA                    Assessment and Plan:          Diagnoses and all orders for this visit:    1. Other closed fracture of left femur with routine healing, unspecified portion of femur, subsequent encounter (Primary)  Comments:  Encouraged him to get up and ambulate more and to get out of bed.  Continue PT/OT with home health.  Pain is stable and well-controlled    2. Chronic obstructive pulmonary disease, unspecified COPD type ([facility])  Comments:  Continue pulmonary toilet with his Dulera and nebulizers.  Lungs are overall clear today    3. On continuous oral anticoagulation  Comments:  On Eliquis status post DVT due to Covid.  We will continue for 6 months total    4. Type 2 diabetes mellitus with hyperglycemia, with long-term current use of insulin ([facility])  Comments:  He is doing quite well with his current insulin doses and diabetes medications.  No changes needed at this time    5. Essential hypertension  Comments:  Stable and well-controlled with current medication    6. Chronic diastolic (congestive) heart failure ([facility])  Comments:  Echo reviewed.  Patient stable and has no current symptoms.  No chest pain or lower extremity edema, no shortness of air    7. Stage 3b chronic kidney disease ([facility])  Comments:  Improved back to baseline prior to hospital discharge          Follow Up   Return in about 1 month (around 2/28/2022) for Recheck.

## 2022-02-02 ENCOUNTER — TELEPHONE (OUTPATIENT)
Dept: FAMILY MEDICINE CLINIC | Age: 57
End: 2022-02-02

## 2022-02-02 NOTE — TELEPHONE ENCOUNTER
Caller: DAVID    Relationship to patient: Home Health    Best call back number: [phone]    Patient is needing: DAVID WITH [facility] HAS CALLED REQUESTING A CALL BACK FROM DR. PUTNAM'S NURSE TO DISCUSS THE PATIENT'S MEDICATIONS.  PLEASE ADVISE, THANK YOU.

## 2022-02-03 ENCOUNTER — TELEPHONE (OUTPATIENT)
Dept: FAMILY MEDICINE CLINIC | Age: 57
End: 2022-02-03

## 2022-02-03 DIAGNOSIS — Z79.4 TYPE 2 DIABETES MELLITUS WITH HYPERGLYCEMIA, WITH LONG-TERM CURRENT USE OF INSULIN: Primary | ICD-10-CM

## 2022-02-03 DIAGNOSIS — E11.65 TYPE 2 DIABETES MELLITUS WITH HYPERGLYCEMIA, WITH LONG-TERM CURRENT USE OF INSULIN: Primary | ICD-10-CM

## 2022-02-03 NOTE — TELEPHONE ENCOUNTER
I have gone thru my last few OV and DC summary from hospital to NH, he was on lasix in Nov, he was not on lasix at DC to NH, I recommend we change his lasix 40 mg to 3 days a week with K on same days and he is to weigh himself and can take an additional lasix if wt is > 2# in 1 day or 5# in one week.  Also have him check BP daily and hold lasix if BP <110/60. denzel

## 2022-02-03 NOTE — TELEPHONE ENCOUNTER
Call per Home health nurse and there is issues with meds that pt has in the home and the list that [facility] gave him at discharged and what we have here  [facility] did not have lasix , cardiezem or hydralazine listed . Per pt report to home health he is taking lasix 40 mg prn daily , and taking the cardizem as listed in 1- OV here and also taking hydralazine. On Tuesday pt's b/p was 100/60 and pulse in the 60's and on wed it was 144/76 pulse was 67 please advise ? I informed her that pcp is out of the office and to have the pt monitor b/p and pulse and call with any issues .

## 2022-02-04 RX ORDER — POTASSIUM CHLORIDE 750 MG/1
TABLET, FILM COATED, EXTENDED RELEASE ORAL
Qty: 40 TABLET | Refills: 1 | Status: SHIPPED | OUTPATIENT
Start: 2022-02-04 | End: 2022-04-01 | Stop reason: SDUPTHER

## 2022-02-04 RX ORDER — DILTIAZEM HYDROCHLORIDE 120 MG/1
120 CAPSULE, COATED, EXTENDED RELEASE ORAL DAILY
Qty: 30 CAPSULE | Refills: 0 | Status: SHIPPED | OUTPATIENT
Start: 2022-02-04 | End: 2022-02-15

## 2022-02-04 RX ORDER — FUROSEMIDE 40 MG/1
TABLET ORAL
Qty: 40 TABLET | Refills: 1 | Status: SHIPPED | OUTPATIENT
Start: 2022-02-04 | End: 2022-03-17 | Stop reason: SDUPTHER

## 2022-02-08 RX ORDER — TAMSULOSIN HYDROCHLORIDE 0.4 MG/1
CAPSULE ORAL
Qty: 14 CAPSULE | Refills: 0 | Status: SHIPPED | OUTPATIENT
Start: 2022-02-08 | End: 2022-02-22

## 2022-02-08 RX ORDER — CHOLECALCIFEROL (VITAMIN D3) 125 MCG
CAPSULE ORAL
Qty: 14 TABLET | Refills: 0 | Status: SHIPPED | OUTPATIENT
Start: 2022-02-08 | End: 2022-02-22

## 2022-02-11 ENCOUNTER — TELEPHONE (OUTPATIENT)
Dept: FAMILY MEDICINE CLINIC | Age: 57
End: 2022-02-11

## 2022-02-11 ENCOUNTER — TELEPHONE (OUTPATIENT)
Dept: PULMONOLOGY | Facility: CLINIC | Age: 57
End: 2022-02-11

## 2022-02-11 DIAGNOSIS — R05.9 COUGH: Primary | ICD-10-CM

## 2022-02-11 NOTE — TELEPHONE ENCOUNTER
Pt states that he coughed up thick green sputum that smells like psuemdomas . O2 96 % continuous O2 at 2 liters he is afebrile and denies any soa .    MD Tena [facility] is calling and they said that pt said he coughed up thick green sputum that smelled like [place] can they do a sputum cx and also I have asked them to call [facility]      per Dr Tena ok for sputum culture, he needs to be seen if he is SOA or having respiratory distress [facility] nurse informed .

## 2022-02-11 NOTE — TELEPHONE ENCOUNTER
"Received a call from [facility].  During home visit today patient complained of intermittent productive cough and further described the expectorated product as \"smells like pseudomonas\".  Nurse states patient is afebrile.  Primary care office gave an order for a sputum culture.    Requested home health ask patient to call pulmonary to schedule a follow up appointment as soon as possible, as it has been over one year since the patient was seen by Dr. Carolina or CON Fields.  She is agreeable and will have sputum culture results faxed to the pulmonary office.  "

## 2022-02-15 RX ORDER — DILTIAZEM HYDROCHLORIDE 120 MG/1
CAPSULE, COATED, EXTENDED RELEASE ORAL
Qty: 30 CAPSULE | Refills: 0 | Status: SHIPPED | OUTPATIENT
Start: 2022-02-15 | End: 2022-03-17 | Stop reason: SDUPTHER

## 2022-02-21 NOTE — TELEPHONE ENCOUNTER
Home health JOSE  said that pt has been unable to produce a sputum sample his coughing in less frequent but still yellow and it still smells .

## 2022-02-22 RX ORDER — TAMSULOSIN HYDROCHLORIDE 0.4 MG/1
CAPSULE ORAL
Qty: 14 CAPSULE | Refills: 0 | Status: SHIPPED | OUTPATIENT
Start: 2022-02-22 | End: 2022-02-28 | Stop reason: SDUPTHER

## 2022-02-22 RX ORDER — CHOLECALCIFEROL (VITAMIN D3) 125 MCG
CAPSULE ORAL
Qty: 14 TABLET | Refills: 0 | Status: SHIPPED | OUTPATIENT
Start: 2022-02-22 | End: 2022-02-28 | Stop reason: SDUPTHER

## 2022-02-28 ENCOUNTER — TELEMEDICINE (OUTPATIENT)
Dept: FAMILY MEDICINE CLINIC | Age: 57
End: 2022-02-28

## 2022-02-28 DIAGNOSIS — E55.9 VITAMIN D DEFICIENCY: ICD-10-CM

## 2022-02-28 DIAGNOSIS — I10 ESSENTIAL HYPERTENSION: ICD-10-CM

## 2022-02-28 DIAGNOSIS — I25.10 CORONARY ARTERY DISEASE INVOLVING NATIVE CORONARY ARTERY OF NATIVE HEART WITHOUT ANGINA PECTORIS: ICD-10-CM

## 2022-02-28 DIAGNOSIS — Z12.5 PROSTATE CANCER SCREENING: ICD-10-CM

## 2022-02-28 DIAGNOSIS — Z79.4 TYPE 2 DIABETES MELLITUS WITH HYPERGLYCEMIA, WITH LONG-TERM CURRENT USE OF INSULIN: ICD-10-CM

## 2022-02-28 DIAGNOSIS — N18.32 STAGE 3B CHRONIC KIDNEY DISEASE: ICD-10-CM

## 2022-02-28 DIAGNOSIS — J44.9 CHRONIC OBSTRUCTIVE PULMONARY DISEASE, UNSPECIFIED COPD TYPE: Primary | ICD-10-CM

## 2022-02-28 DIAGNOSIS — E11.65 TYPE 2 DIABETES MELLITUS WITH HYPERGLYCEMIA, WITH LONG-TERM CURRENT USE OF INSULIN: ICD-10-CM

## 2022-02-28 DIAGNOSIS — M54.50 CHRONIC BILATERAL LOW BACK PAIN, UNSPECIFIED WHETHER SCIATICA PRESENT: ICD-10-CM

## 2022-02-28 DIAGNOSIS — R35.1 BENIGN PROSTATIC HYPERPLASIA WITH NOCTURIA: ICD-10-CM

## 2022-02-28 DIAGNOSIS — I50.32 CHRONIC DIASTOLIC (CONGESTIVE) HEART FAILURE: ICD-10-CM

## 2022-02-28 DIAGNOSIS — N40.1 BENIGN PROSTATIC HYPERPLASIA WITH NOCTURIA: ICD-10-CM

## 2022-02-28 DIAGNOSIS — Z11.59 ENCOUNTER FOR SCREENING FOR OTHER VIRAL DISEASES: ICD-10-CM

## 2022-02-28 DIAGNOSIS — G89.29 CHRONIC BILATERAL LOW BACK PAIN, UNSPECIFIED WHETHER SCIATICA PRESENT: ICD-10-CM

## 2022-02-28 PROCEDURE — 99214 OFFICE O/P EST MOD 30 MIN: CPT | Performed by: FAMILY MEDICINE

## 2022-02-28 RX ORDER — CHOLECALCIFEROL (VITAMIN D3) 125 MCG
2000 CAPSULE ORAL DAILY
Qty: 90 TABLET | Refills: 1 | Status: SHIPPED | OUTPATIENT
Start: 2022-02-28 | End: 2022-03-11

## 2022-02-28 RX ORDER — TAMSULOSIN HYDROCHLORIDE 0.4 MG/1
1 CAPSULE ORAL EVERY EVENING
Qty: 90 CAPSULE | Refills: 1 | Status: SHIPPED | OUTPATIENT
Start: 2022-02-28 | End: 2022-06-08

## 2022-02-28 RX ORDER — NIFEDIPINE 30 MG/1
30 TABLET, EXTENDED RELEASE ORAL DAILY
Qty: 90 TABLET | Refills: 1 | Status: SHIPPED | OUTPATIENT
Start: 2022-02-28 | End: 2022-04-11 | Stop reason: HOSPADM

## 2022-02-28 RX ORDER — DAPAGLIFLOZIN 5 MG/1
5 TABLET, FILM COATED ORAL DAILY
Qty: 90 TABLET | Refills: 1 | Status: SHIPPED | OUTPATIENT
Start: 2022-02-28 | End: 2022-06-09 | Stop reason: SDUPTHER

## 2022-02-28 NOTE — PROGRESS NOTES
Preston Wallis presents to [facility].    Chief Complaint: Routine follow-up for med refills    Subjective       History of Present Illness:  HPI    Patient presents with type 1 diabetes mellitus with diabetic chronic kidney disease.  Specifically, this is type 1 diabetes, complicated by nephropathy and peripheral neuropathy.  Compliance with treatment has been poor; he skips some insulin doses due to forgetfulness and inconvenience of dosing and does not follow a diet and exercise regimen.      Tobacco screen: Non-smoker.  Current meds include insulin/injectable ( amdelog, basaglar ).  He does not perform home blood glucose monitoring-he states BS running 130-200.  Has not fallen recently In regard to preventative care, his last ophthalmology exam was in 4/2020 per patient memory          Concerning essential (primary) hypertension, his current cardiac medication regimen includes a diuretic ( Lasix 40 mg daily ), a beta-blocker ( Metoprolol 50 mg twice daily ), a calcium channel blocker ( Cardizem 120 mg daily ), and hydralazine.  Compliance with treatment has been fair; he Patient reports good compliance but cannot name his medications or how he takes them and continues to not bring his meds in to see me.  He is tolerating the medication well without side effects.  He has not kept a blood pressure diary, but states that pressures have been okay.        He has h/o hospitalization for pseudomonas and MRSA  pneumonia in past, and he gets recurrent infections and sees Dr Chavez and is on intermittent tobramycin nebulizer.  He is also on albuterol/duo nebs and symbicort daily, albuterol nebulizers.     Hypokalemia-he was sent home on K supplementation    He has foot pain due to neuropathy       Review of Systems:  Review of Systems   Constitutional: Negative for chills, fatigue and fever.   HENT: Negative for congestion, ear discharge and sore throat.    Respiratory: Negative for  shortness of breath.    Cardiovascular: Negative for chest pain.   Gastrointestinal: Negative for abdominal pain, constipation, diarrhea, nausea, vomiting and GERD.   Genitourinary: Negative for flank pain.   Neurological: Negative for dizziness and headache.   Psychiatric/Behavioral: Negative for sleep disturbance, suicidal ideas and depressed mood. The patient is not nervous/anxious.         Objective   Medical History:  Past Medical History:   • Age-related cognitive decline   • Allergic contact dermatitis   • Allergies   • Anemia   • Bronchiectasis with acute lower respiratory infection ([facility])   • Chest pain   • Chronic bronchitis ([facility])   • Chronic diastolic (congestive) heart failure ([facility])   • Chronic kidney disease   • Chronic respiratory failure with hypoxia ([facility])   • COPD (chronic obstructive pulmonary disease) ([facility])   • COPD with acute exacerbation ([facility])   • Cough   • Dependence on supplemental oxygen   • Eczema   • Erectile dysfunction    due to organic reasons   • Essential (primary) hypertension   • Fracture    closed fracture of other tarsal and metatarsal bones   • GERD without esophagitis   • High risk medication use   • Hypercholesteremia   • Hypomagnesemia   • Insomnia   • Low back pain   • Major depressive disorder   • Major depressive disorder   • Morbid (severe) obesity due to excess calories ([facility])   • MRSA pneumonia ([facility])   • Muscle weakness   • 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 without evidence of necrosis ([facility])   • Obstructive sleep apnea (adult) (pediatric)   • Other forms of dyspnea   • Other long term (current) drug therapy   • Other pulmonary embolism without acute cor pulmonale ([facility])   • Other specified noninfective gastroenteritis and colitis   • Other spondylosis, lumbar region   • Pain in both knees   • Paroxysmal atrial fibrillation ([facility])   • Peripheral neuropathy    attributed to type 2 diabetes   • Pneumonia, unspecified organism   •  Polyneuropathy   • Rash and other nonspecific skin eruption   • Syncope and collapse   • Tachycardia   • Type 1 diabetes mellitus with diabetic chronic kidney disease (HCC)   • Type 2 diabetes mellitus (HCC)   • Unspecified fall, initial encounter     Past Surgical History:   • CHOLECYSTECTOMY   • KNEE SURGERY   • OTHER SURGICAL HISTORY    venous port   • TONSILLECTOMY AND ADENOIDECTOMY      Family History   Problem Relation Age of Onset   • Coronary artery disease Mother    • Hypertension Mother    • Diabetes type II Mother    • Asthma Father    • Cancer Sister      Social History     Tobacco Use   • Smoking status: Former Smoker     Packs/day: 1.00     Years: 6.00     Pack years: 6.00     Types: Cigarettes     Quit date:      Years since quittin.1   • Smokeless tobacco: Never Used   Substance Use Topics   • Alcohol use: Not Currently       Health Maintenance Due   Topic Date Due   • COLORECTAL CANCER SCREENING  Never done   • ANNUAL PHYSICAL  Never done   • COVID-19 Vaccine (1) Never done   • Hepatitis B (1 of 3 - Risk 3-dose series) Never done   • ZOSTER VACCINE (1 of 2) Never done   • DIABETIC FOOT EXAM  Never done   • HEMOGLOBIN A1C  2021   • DIABETIC EYE EXAM  2021   • INFLUENZA VACCINE  2021        Immunization History   Administered Date(s) Administered   • Flu Vaccine Quad PF >36MO 10/18/2016, 10/16/2017, 2019   • Flu Vaccine Split Quad 2019   • Influenza Quad Vaccine (Inpatient) 2013   • Influenza, Unspecified 2020   • Pneumococcal Polysaccharide (PPSV23) 1997   • Tdap 2018       Allergies   Allergen Reactions   • Benadryl [Diphenhydramine] Itching   • Proventil [Albuterol] Other (See Comments)     Mouth sores          Medications:  Current Outpatient Medications on File Prior to Visit   Medication Sig   • acetaminophen (TYLENOL) 500 MG tablet Take 1,000 mg by mouth Every 8 (Eight) Hours As Needed.   • albuterol sulfate  (90 Base)  MCG/ACT inhaler Inhale 2 puffs 4 (Four) Times a Day As Needed.   • apixaban (Eliquis) 5 MG tablet tablet take 2 tablets BY MOUTH EVERY DAY   • aspirin 81 MG EC tablet Take 81 mg by mouth Daily.   • atorvastatin (LIPITOR) 20 MG tablet TAKE 1 TABLET BY MOUTH EACH NIGHT AT BEDTIME   • B-D UF III MINI PEN NEEDLES 31G X 5 MM misc USE AS DIRECTED WITH INSULIN   • Blood Glucose Monitoring Suppl w/Device kit 1 kit Take As Directed. Dx e11.9   • budesonide-formoterol (Symbicort) 160-4.5 MCG/ACT inhaler Inhale 2 puffs 2 (two) times a day.   • Bydureon BCise 2 MG/0.85ML auto-injector injection INJECT 2 MG SUBCUTANEOUSLY EVERY WEEK   • Calcitrate 950 (200 Ca) MG tablet TAKE 1 TABLET BY MOUTH EVERY DAY   • Diclofenac Sodium (VOLTAREN) 1 % gel gel    • dilTIAZem CD (CARDIZEM CD) 120 MG 24 hr capsule TAKE 1 CAPSULE BY MOUTH EVERY DAY   • docusate sodium (COLACE) 100 MG capsule TAKE 1 CAPSULE BY MOUTH TWICE DAILY   • DULoxetine (CYMBALTA) 60 MG capsule TAKE 1 CAPSULE BY MOUTH TWICE DAILY   • famotidine (PEPCID) 40 MG tablet TAKE 1 TABLET BY MOUTH EVERY DAY   • furosemide (Lasix) 40 MG tablet 1 po 3 days a week, hold if BP < 110/60. Take one potassium with every lasix   • gabapentin (NEURONTIN) 300 MG capsule Take 1 capsule by mouth every night at bedtime.   • glucose blood test strip 1 each by Other route 4 (Four) Times a Day With Meals & at Bedtime. Use as instructed dx e 11.9   • hydrALAZINE (APRESOLINE) 25 MG tablet TAKE 1 TABLET (25 MG) BY ORAL ROUTE 2 TIMES PER DAY WITH FOOD   • Insulin Glargine (LANTUS SOLOSTAR) 100 UNIT/ML injection pen Inject 40 Units under the skin into the appropriate area as directed Daily.   • Insulin Lispro (ADMELOG SOLOSTAR SC) Inject  under the skin into the appropriate area as directed. Per SSI, if BS <150 = 0 units, 151-180= 1 unit, 181-210= 2units, 211-240= 3units, 241-270= 4units, 271-300= 5units, 301-330=6 untis, 331-390= 8units, <390 give 9units   • ipratropium (ATROVENT HFA) 17 MCG/ACT inhaler  Inhale 2 puffs 4 (Four) Times a Day.   • ipratropium-albuterol (DUO-NEB) 0.5-2.5 mg/3 ml nebulizer Take 3 mL by nebulization Every 4 (Four) Hours As Needed.   • iron polysaccharides (NIFEREX) 150 MG capsule Take 1 capsule by mouth Daily.   • levalbuterol (XOPENEX HFA) 45 MCG/ACT inhaler Inhale 1-2 puffs Every 4 (Four) Hours As Needed for Wheezing.   • magnesium oxide (MAGOX) 400 (241.3 Mg) MG tablet tablet Take 400 mg by mouth 3 (Three) Times a Day.   • metoprolol tartrate (LOPRESSOR) 100 MG tablet TAKE 2 TABLETS BY MOUTH EVERY MORNING AND 1 TABLET IN THE EVENING   • mometasone-formoterol (DULERA 200) 200-5 MCG/ACT inhaler Inhale 2 puffs 2 (Two) Times a Day.   • oxyCODONE (OXY-IR) 5 MG capsule Take 5 mg by mouth Every 4 (Four) Hours As Needed for Moderate Pain .   • potassium chloride 10 MEQ CR tablet Take 1 po 3 days a week with lasix.   • tobramycin PF (MOIZ) 300 MG/5ML nebulizer solution Take 5 mL by nebulization 2 (two) times a day.   • traZODone (DESYREL) 50 MG tablet TAKE 1 TABLET BY MOUTH AT BEDTIME   • TRUEplus Lancets 28G misc USE AS DIRECTED   • [DISCONTINUED] Cholecalciferol (Vitamin D3) 50 MCG (2000 UT) tablet Take 1 tablet BY MOUTH EVERY MORNING FOR vitamin deficiency   • [DISCONTINUED] dapagliflozin (Farxiga) 5 MG tablet tablet Take 5 mg by mouth Daily.   • [DISCONTINUED] tamsulosin (FLOMAX) 0.4 MG capsule 24 hr capsule TAKE 1 CAPSULE BY MOUTH EVERY evening   • [DISCONTINUED] sulfamethoxazole-trimethoprim (Bactrim DS) 800-160 MG per tablet Take 1 tablet by mouth 2 (Two) Times a Day.     No current facility-administered medications on file prior to visit.       Vital Signs:   There were no vitals taken for this visit.      Physical Exam:  Physical Exam  Vitals reviewed.   Constitutional:       General: He is not in acute distress.     Appearance: Normal appearance. He is normal weight. He is not ill-appearing.   HENT:      Head: Normocephalic and atraumatic.   Eyes:      Extraocular Movements:  Extraocular movements intact.      Pupils: Pupils are equal, round, and reactive to light.   Pulmonary:      Effort: Pulmonary effort is normal.   Neurological:      General: No focal deficit present.      Mental Status: He is alert and oriented to person, place, and time.   Psychiatric:         Mood and Affect: Mood normal.         Behavior: Behavior normal.         Thought Content: Thought content normal.         Judgment: Judgment normal.         Result Review      The following data was reviewed by iKmmy Riley MD on 02/28/2022.  Lab Results   Component Value Date    WBC 12.34 (H) 01/02/2022    HGB 7.8 (L) 01/02/2022    HCT 24.9 (L) 01/02/2022    MCV 92.9 01/02/2022     (H) 01/02/2022     Lab Results   Component Value Date    GLUCOSE 402 (C) 07/27/2021    BUN 15 07/27/2021    CREATININE 1.58 (H) 07/27/2021    EGFRIFNONA 46 (L) 07/27/2021    BCR 9.5 07/27/2021    K 3.9 07/27/2021    CO2 28.6 07/27/2021    CALCIUM 9.4 07/27/2021    ALBUMIN 3.40 (L) 07/27/2021    LABIL2 0.9 (L) 09/30/2020    AST 15 07/27/2021    ALT 12 07/27/2021     Lab Results   Component Value Date    CHOL 183 07/27/2021    CHLPL 165 08/26/2020    TRIG 173 (H) 07/27/2021    HDL 38 (L) 07/27/2021     (H) 07/27/2021     Lab Results   Component Value Date    TSH 0.580 03/17/2020     Lab Results   Component Value Date    HGBA1C 11.1 (H) 08/26/2020     No results found for: PSA                    Assessment and Plan:          Diagnoses and all orders for this visit:    1. Chronic obstructive pulmonary disease, unspecified COPD type (HCC) (Primary)  Comments:  Overall stable and doing well on current inhalers.  No changes are needed in current medication treatment.  Patient tolerates medications well    2. Type 2 diabetes mellitus with hyperglycemia, with long-term current use of insulin ([facility])  Comments:  Blood sugars are improving.  Especially with the Farxiga.  Patient is due for labs.  Previous labs reviewed.  Patient needs his  eye exam updated  Orders:  -     dapagliflozin (Farxiga) 5 MG tablet tablet; Take 1 tablet by mouth Daily.  Dispense: 90 tablet; Refill: 1  -     Comprehensive Metabolic Panel; Future  -     Lipid Panel; Future  -     Hemoglobin A1c; Future  -     Microalbumin / Creatinine Urine Ratio - Urine, Clean Catch; Future    3. Essential hypertension  Comments:  Recommend home blood pressure checks.  Orders:  -     CBC (No Diff); Future    4. Chronic diastolic (congestive) heart failure (HCC)    5. Stage 3b chronic kidney disease (HCC)  Comments:  Avoid NSAIDs and PPIs, push fluids 64 ounces a day.  Due for labs reeval kidney function    6. Chronic bilateral low back pain, unspecified whether sciatica present  Comments:  Overall stable on current medication    7. Benign prostatic hyperplasia with nocturia  Comments:  Check a PSA  Orders:  -     tamsulosin (FLOMAX) 0.4 MG capsule 24 hr capsule; Take 1 capsule by mouth Every Evening.  Dispense: 90 capsule; Refill: 1  -     PSA SCREENING; Future    8. Coronary artery disease involving native coronary artery of native heart without angina pectoris  Comments:  No acute issues   Orders:  -     NIFEdipine XL (Procardia XL) 30 MG 24 hr tablet; Take 1 tablet by mouth Daily.  Dispense: 90 tablet; Refill: 1    9. Encounter for screening for other viral diseases  -     Hepatitis C antibody; Future    10. Vitamin D deficiency  -     Cholecalciferol (Vitamin D3) 50 MCG (2000 UT) tablet; Take 1 tablet by mouth Daily.  Dispense: 90 tablet; Refill: 1    11. Prostate cancer screening  -     PSA SCREENING; Future          Follow Up   Return in about 3 months (around 5/28/2022) for Annual physical.     Telehealth: Verbal consent obtained for visit to occur via televideo conferencing; Staff, other than provider, present during telephone visit include the floor nurse,  Dr Bourne was present during the telehealth OV with patient. [facility]

## 2022-03-03 RX ORDER — HYDRALAZINE HYDROCHLORIDE 25 MG/1
TABLET, FILM COATED ORAL
Qty: 180 TABLET | Refills: 1 | Status: SHIPPED | OUTPATIENT
Start: 2022-03-03 | End: 2022-03-25 | Stop reason: SDUPTHER

## 2022-03-10 ENCOUNTER — TELEPHONE (OUTPATIENT)
Dept: FAMILY MEDICINE CLINIC | Age: 57
End: 2022-03-10

## 2022-03-10 DIAGNOSIS — E55.9 VITAMIN D DEFICIENCY: ICD-10-CM

## 2022-03-10 NOTE — TELEPHONE ENCOUNTER
I called [facility] to inform them of overdue lab orders I had to leave a message on Carmen's mail to call me back . Carmen inf of needed orders they will see pt on 3- postponed  Until then .

## 2022-03-10 NOTE — TELEPHONE ENCOUNTER
----- Message from Yuliet Marques LPN sent at 2/21/2022 11:50 AM EST -----      ----- Message -----  From: SYSTEM  Sent: 2/21/2022   1:17 AM EST  To: [facility]

## 2022-03-14 RX ORDER — CHOLECALCIFEROL (VITAMIN D3) 125 MCG
CAPSULE ORAL
Qty: 90 TABLET | Refills: 0 | Status: SHIPPED | OUTPATIENT
Start: 2022-03-14 | End: 2022-04-18 | Stop reason: SDUPTHER

## 2022-03-15 NOTE — TELEPHONE ENCOUNTER
Pt is coming in Thursday for an appt and he will go to our lab to complete needed labs he has refused home health times 2 days.

## 2022-03-17 ENCOUNTER — OFFICE VISIT (OUTPATIENT)
Dept: FAMILY MEDICINE CLINIC | Age: 57
End: 2022-03-17

## 2022-03-17 ENCOUNTER — LAB (OUTPATIENT)
Dept: LAB | Facility: HOSPITAL | Age: 57
End: 2022-03-17

## 2022-03-17 VITALS
HEART RATE: 69 BPM | BODY MASS INDEX: 39.99 KG/M2 | SYSTOLIC BLOOD PRESSURE: 123 MMHG | HEIGHT: 69 IN | DIASTOLIC BLOOD PRESSURE: 55 MMHG | WEIGHT: 270 LBS | OXYGEN SATURATION: 100 % | TEMPERATURE: 97.9 F

## 2022-03-17 DIAGNOSIS — D50.8 IRON DEFICIENCY ANEMIA SECONDARY TO INADEQUATE DIETARY IRON INTAKE: ICD-10-CM

## 2022-03-17 DIAGNOSIS — K21.9 GASTROESOPHAGEAL REFLUX DISEASE WITHOUT ESOPHAGITIS: ICD-10-CM

## 2022-03-17 DIAGNOSIS — I10 ESSENTIAL HYPERTENSION: ICD-10-CM

## 2022-03-17 DIAGNOSIS — I48.0 PAROXYSMAL ATRIAL FIBRILLATION: ICD-10-CM

## 2022-03-17 DIAGNOSIS — E11.65 TYPE 2 DIABETES MELLITUS WITH HYPERGLYCEMIA, WITH LONG-TERM CURRENT USE OF INSULIN: ICD-10-CM

## 2022-03-17 DIAGNOSIS — N18.32 STAGE 3B CHRONIC KIDNEY DISEASE: ICD-10-CM

## 2022-03-17 DIAGNOSIS — I10 ESSENTIAL (PRIMARY) HYPERTENSION: ICD-10-CM

## 2022-03-17 DIAGNOSIS — R60.0 LOCALIZED EDEMA: ICD-10-CM

## 2022-03-17 DIAGNOSIS — F51.01 PRIMARY INSOMNIA: Primary | ICD-10-CM

## 2022-03-17 DIAGNOSIS — N40.1 BENIGN PROSTATIC HYPERPLASIA WITH NOCTURIA: ICD-10-CM

## 2022-03-17 DIAGNOSIS — Z11.59 ENCOUNTER FOR SCREENING FOR OTHER VIRAL DISEASES: ICD-10-CM

## 2022-03-17 DIAGNOSIS — Z12.5 PROSTATE CANCER SCREENING: ICD-10-CM

## 2022-03-17 DIAGNOSIS — Z79.4 TYPE 2 DIABETES MELLITUS WITH HYPERGLYCEMIA, WITH LONG-TERM CURRENT USE OF INSULIN: ICD-10-CM

## 2022-03-17 DIAGNOSIS — F32.4 MAJOR DEPRESSIVE DISORDER WITH SINGLE EPISODE, IN PARTIAL REMISSION: ICD-10-CM

## 2022-03-17 DIAGNOSIS — E55.9 VITAMIN D DEFICIENCY: ICD-10-CM

## 2022-03-17 DIAGNOSIS — E78.5 HYPERLIPIDEMIA, UNSPECIFIED HYPERLIPIDEMIA TYPE: ICD-10-CM

## 2022-03-17 DIAGNOSIS — R35.1 BENIGN PROSTATIC HYPERPLASIA WITH NOCTURIA: ICD-10-CM

## 2022-03-17 LAB
25(OH)D3 SERPL-MCNC: 27 NG/ML (ref 30–100)
ALBUMIN SERPL-MCNC: 3.6 G/DL (ref 3.5–5.2)
ALBUMIN/GLOB SERPL: 0.8 G/DL
ALP SERPL-CCNC: 176 U/L (ref 39–117)
ALT SERPL W P-5'-P-CCNC: 12 U/L (ref 1–41)
ANION GAP SERPL CALCULATED.3IONS-SCNC: 10.3 MMOL/L (ref 5–15)
AST SERPL-CCNC: 16 U/L (ref 1–40)
BILIRUB SERPL-MCNC: 0.2 MG/DL (ref 0–1.2)
BUN SERPL-MCNC: 17 MG/DL (ref 6–20)
BUN/CREAT SERPL: 11.1 (ref 7–25)
CALCIUM SPEC-SCNC: 9.4 MG/DL (ref 8.6–10.5)
CHLORIDE SERPL-SCNC: 95 MMOL/L (ref 98–107)
CHOLEST SERPL-MCNC: 131 MG/DL (ref 0–200)
CO2 SERPL-SCNC: 32.7 MMOL/L (ref 22–29)
CREAT SERPL-MCNC: 1.53 MG/DL (ref 0.76–1.27)
DEPRECATED RDW RBC AUTO: 40.7 FL (ref 37–54)
EGFRCR SERPLBLD CKD-EPI 2021: 53 ML/MIN/1.73
ERYTHROCYTE [DISTWIDTH] IN BLOOD BY AUTOMATED COUNT: 12.3 % (ref 12.3–15.4)
GLOBULIN UR ELPH-MCNC: 4.3 GM/DL
GLUCOSE SERPL-MCNC: 238 MG/DL (ref 65–99)
HBA1C MFR BLD: 8.3 % (ref 4.8–5.6)
HCT VFR BLD AUTO: 35.2 % (ref 37.5–51)
HCV AB SER DONR QL: NORMAL
HDLC SERPL-MCNC: 65 MG/DL (ref 40–60)
HGB BLD-MCNC: 10.8 G/DL (ref 13–17.7)
LDLC SERPL CALC-MCNC: 52 MG/DL (ref 0–100)
LDLC/HDLC SERPL: 0.8 {RATIO}
MCH RBC QN AUTO: 27.6 PG (ref 26.6–33)
MCHC RBC AUTO-ENTMCNC: 30.7 G/DL (ref 31.5–35.7)
MCV RBC AUTO: 90 FL (ref 79–97)
PLATELET # BLD AUTO: 397 10*3/MM3 (ref 140–450)
PMV BLD AUTO: 8.4 FL (ref 6–12)
POTASSIUM SERPL-SCNC: 3.5 MMOL/L (ref 3.5–5.2)
PROT SERPL-MCNC: 7.9 G/DL (ref 6–8.5)
PSA SERPL-MCNC: 0.72 NG/ML (ref 0–4)
RBC # BLD AUTO: 3.91 10*6/MM3 (ref 4.14–5.8)
SODIUM SERPL-SCNC: 138 MMOL/L (ref 136–145)
TRIGL SERPL-MCNC: 69 MG/DL (ref 0–150)
VLDLC SERPL-MCNC: 14 MG/DL (ref 5–40)
WBC NRBC COR # BLD: 10.63 10*3/MM3 (ref 3.4–10.8)

## 2022-03-17 PROCEDURE — G0103 PSA SCREENING: HCPCS

## 2022-03-17 PROCEDURE — 85027 COMPLETE CBC AUTOMATED: CPT

## 2022-03-17 PROCEDURE — 86803 HEPATITIS C AB TEST: CPT

## 2022-03-17 PROCEDURE — 99214 OFFICE O/P EST MOD 30 MIN: CPT | Performed by: FAMILY MEDICINE

## 2022-03-17 PROCEDURE — 83036 HEMOGLOBIN GLYCOSYLATED A1C: CPT

## 2022-03-17 PROCEDURE — 82306 VITAMIN D 25 HYDROXY: CPT

## 2022-03-17 PROCEDURE — 83540 ASSAY OF IRON: CPT

## 2022-03-17 PROCEDURE — 84466 ASSAY OF TRANSFERRIN: CPT

## 2022-03-17 PROCEDURE — 36415 COLL VENOUS BLD VENIPUNCTURE: CPT

## 2022-03-17 PROCEDURE — 80061 LIPID PANEL: CPT

## 2022-03-17 PROCEDURE — 80053 COMPREHEN METABOLIC PANEL: CPT

## 2022-03-17 RX ORDER — FUROSEMIDE 40 MG/1
TABLET ORAL
Qty: 40 TABLET | Refills: 1 | Status: SHIPPED | OUTPATIENT
Start: 2022-03-17 | End: 2022-04-05 | Stop reason: ALTCHOICE

## 2022-03-17 RX ORDER — DOXYCYCLINE HYCLATE 50 MG/1
324 CAPSULE, GELATIN COATED ORAL
Qty: 90 TABLET | Refills: 1 | Status: SHIPPED | OUTPATIENT
Start: 2022-03-17 | End: 2022-09-12 | Stop reason: SDUPTHER

## 2022-03-17 RX ORDER — FAMOTIDINE 40 MG/1
40 TABLET, FILM COATED ORAL DAILY
Qty: 90 TABLET | Refills: 0 | Status: SHIPPED | OUTPATIENT
Start: 2022-03-17 | End: 2022-03-21 | Stop reason: SDUPTHER

## 2022-03-17 RX ORDER — TRAZODONE HYDROCHLORIDE 100 MG/1
100 TABLET ORAL NIGHTLY
Qty: 90 TABLET | Refills: 1 | Status: SHIPPED | OUTPATIENT
Start: 2022-03-17 | End: 2022-09-12 | Stop reason: SDUPTHER

## 2022-03-17 RX ORDER — ATORVASTATIN CALCIUM 20 MG/1
20 TABLET, FILM COATED ORAL NIGHTLY
Qty: 90 TABLET | Refills: 1 | Status: SHIPPED | OUTPATIENT
Start: 2022-03-17 | End: 2022-11-28 | Stop reason: SDUPTHER

## 2022-03-17 RX ORDER — DILTIAZEM HYDROCHLORIDE 120 MG/1
120 CAPSULE, COATED, EXTENDED RELEASE ORAL DAILY
Qty: 30 CAPSULE | Refills: 0 | Status: SHIPPED | OUTPATIENT
Start: 2022-03-17 | End: 2022-05-05

## 2022-03-17 RX ORDER — DULOXETIN HYDROCHLORIDE 60 MG/1
60 CAPSULE, DELAYED RELEASE ORAL 2 TIMES DAILY
Qty: 180 CAPSULE | Refills: 0 | Status: SHIPPED | OUTPATIENT
Start: 2022-03-17 | End: 2022-06-02 | Stop reason: SDUPTHER

## 2022-03-17 RX ORDER — EXENATIDE 2 MG/.85ML
2 INJECTION, SUSPENSION, EXTENDED RELEASE SUBCUTANEOUS WEEKLY
Qty: 4 ML | Refills: 5 | Status: SHIPPED | OUTPATIENT
Start: 2022-03-17 | End: 2022-07-25

## 2022-03-17 RX ORDER — METOPROLOL TARTRATE 100 MG/1
100 TABLET ORAL 2 TIMES DAILY
Qty: 180 TABLET | Refills: 1 | Status: SHIPPED | OUTPATIENT
Start: 2022-03-17 | End: 2022-07-06 | Stop reason: SDUPTHER

## 2022-03-17 NOTE — PROGRESS NOTES
Preston Wallis presents to [facility].    Chief Complaint:    Subjective       History of Present Illness:  HPI     Not sleeping well, only gets about 4 hours a night.  He is s/p femur fracture LLE and he is still sore, s/p surgical repair with judith and pins L femur. He is on eliquis blood thinner due to DVT secondary to Covid  He is taking hydrocodone for pain.  He also has had N/V with stomach virus running thru the family,  He has had poor appetite, too, for past 4 days.       history of CHF, DVT, CKD, COPD, hypertension, diabetes mellitus, and hyperlipidemia       He is on atorvastatin, aspirin, albuterol, dapagliflozin, apixaban, metoprolol, gabapentin, and insulin. Allergic to Benadryl and Proventil.     He is on 2 liters oxygen for the COPD, as well as proventil      Denies CP, SOA, n/v/d, fever, dizziness, hitting head, or LOC.       His echo in the hospital showed Left ventricular global and regional systolic function is normal. Calculated left ventricular ejection fraction of 53 % (Single plane Dick'smethod). The right ventricle is normal in size and function.  Pt chronically on O2 for COPD, may also have chronic changes related to recent COVID infection. Remained stable on 2L NC.                Review of Systems:  Review of Systems   Constitutional: Negative for chills, fatigue and fever.   HENT: Negative for congestion, ear discharge and sore throat.    Respiratory: Negative for shortness of breath.    Cardiovascular: Negative for chest pain.   Gastrointestinal: Negative for abdominal pain, constipation, diarrhea, nausea, vomiting and GERD.   Genitourinary: Negative for flank pain.   Neurological: Negative for dizziness and headache.   Psychiatric/Behavioral: Negative for depressed mood.        Objective   Medical History:  Past Medical History:   • Age-related cognitive decline   • Allergic contact dermatitis   • Allergies   • Anemia   • Bronchiectasis with acute lower  respiratory infection ([facility])   • Chest pain   • Chronic bronchitis ([facility])   • Chronic diastolic (congestive) heart failure ([facility])   • Chronic kidney disease   • Chronic respiratory failure with hypoxia ([facility])   • COPD (chronic obstructive pulmonary disease) ([facility])   • COPD with acute exacerbation ([facility])   • Cough   • Dependence on supplemental oxygen   • Eczema   • Erectile dysfunction    due to organic reasons   • Essential (primary) hypertension   • Fracture    closed fracture of other tarsal and metatarsal bones   • GERD without esophagitis   • High risk medication use   • Hypercholesteremia   • Hypomagnesemia   • Insomnia   • Low back pain   • Major depressive disorder   • Major depressive disorder   • Morbid (severe) obesity due to excess calories ([facility])   • MRSA pneumonia ([facility])   • Muscle weakness   • 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 without evidence of necrosis ([facility])   • Obstructive sleep apnea (adult) (pediatric)   • Other forms of dyspnea   • Other long term (current) drug therapy   • Other pulmonary embolism without acute cor pulmonale ([facility])   • Other specified noninfective gastroenteritis and colitis   • Other spondylosis, lumbar region   • Pain in both knees   • Paroxysmal atrial fibrillation ([facility])   • Peripheral neuropathy    attributed to type 2 diabetes   • Pneumonia, unspecified organism   • Polyneuropathy   • Rash and other nonspecific skin eruption   • Syncope and collapse   • Tachycardia   • Type 1 diabetes mellitus with diabetic chronic kidney disease ([facility])   • Type 2 diabetes mellitus ([facility])   • Unspecified fall, initial encounter     Past Surgical History:   • CHOLECYSTECTOMY   • KNEE SURGERY   • OTHER SURGICAL HISTORY    venous port   • TONSILLECTOMY AND ADENOIDECTOMY      Family History   Problem Relation Age of Onset   • Coronary artery disease Mother    • Hypertension Mother    • Diabetes type II Mother    • Asthma Father    • Cancer Sister       Social History     Tobacco Use   • Smoking status: Former Smoker     Packs/day: 1.00     Years: 6.00     Pack years: 6.00     Types: Cigarettes     Quit date:      Years since quittin.2   • Smokeless tobacco: Never Used   Substance Use Topics   • Alcohol use: Not Currently       Health Maintenance Due   Topic Date Due   • COLORECTAL CANCER SCREENING  Never done   • ANNUAL PHYSICAL  Never done   • Hepatitis B (1 of 3 - Risk 3-dose series) Never done   • DIABETIC FOOT EXAM  Never done   • HEMOGLOBIN A1C  2021   • DIABETIC EYE EXAM  2021        Immunization History   Administered Date(s) Administered   • Flu Vaccine Quad PF >36MO 10/18/2016, 10/16/2017, 2019   • Flu Vaccine Split Quad 2019   • Influenza Quad Vaccine (Inpatient) 2013   • Influenza, Unspecified 2020   • Pneumococcal Polysaccharide (PPSV23) 1997   • Tdap 2018       Allergies   Allergen Reactions   • Benadryl [Diphenhydramine] Itching   • Proventil [Albuterol] Other (See Comments)     Mouth sores          Medications:  Current Outpatient Medications on File Prior to Visit   Medication Sig   • acetaminophen (TYLENOL) 500 MG tablet Take 1,000 mg by mouth Every 8 (Eight) Hours As Needed.   • albuterol sulfate  (90 Base) MCG/ACT inhaler Inhale 2 puffs 4 (Four) Times a Day As Needed.   • apixaban (Eliquis) 5 MG tablet tablet take 2 tablets BY MOUTH EVERY DAY   • aspirin 81 MG EC tablet Take 81 mg by mouth Daily.   • B-D UF III MINI PEN NEEDLES 31G X 5 MM misc USE AS DIRECTED WITH INSULIN   • Blood Glucose Monitoring Suppl w/Device kit 1 kit Take As Directed. Dx e11.9   • budesonide-formoterol (SYMBICORT) 160-4.5 MCG/ACT inhaler Inhale 2 puffs 2 (two) times a day.   • Calcitrate 950 (200 Ca) MG tablet TAKE 1 TABLET BY MOUTH EVERY DAY   • Cholecalciferol (Vitamin D3) 50 MCG (2000 UT) tablet Take 1 tablet BY MOUTH EVERY MORNING FOR vitamin deficiency   • dapagliflozin (Farxiga) 5 MG tablet tablet  Take 1 tablet by mouth Daily.   • Diclofenac Sodium (VOLTAREN) 1 % gel gel    • docusate sodium (COLACE) 100 MG capsule TAKE 1 CAPSULE BY MOUTH TWICE DAILY   • gabapentin (NEURONTIN) 300 MG capsule Take 1 capsule by mouth every night at bedtime.   • glucose blood test strip 1 each by Other route 4 (Four) Times a Day With Meals & at Bedtime. Use as instructed dx e 11.9   • hydrALAZINE (APRESOLINE) 25 MG tablet TAKE 1 TABLET BY MOUTH TWICE DAILY with food   • Insulin Glargine (LANTUS SOLOSTAR) 100 UNIT/ML injection pen Inject 40 Units under the skin into the appropriate area as directed Daily.   • Insulin Lispro (ADMELOG SOLOSTAR SC) Inject  under the skin into the appropriate area as directed. Per SSI, if BS <150 = 0 units, 151-180= 1 unit, 181-210= 2units, 211-240= 3units, 241-270= 4units, 271-300= 5units, 301-330=6 untis, 331-390= 8units, <390 give 9units   • ipratropium (ATROVENT HFA) 17 MCG/ACT inhaler Inhale 2 puffs 4 (Four) Times a Day.   • ipratropium-albuterol (DUO-NEB) 0.5-2.5 mg/3 ml nebulizer Take 3 mL by nebulization Every 4 (Four) Hours As Needed.   • NIFEdipine XL (Procardia XL) 30 MG 24 hr tablet Take 1 tablet by mouth Daily.   • oxyCODONE (OXY-IR) 5 MG capsule Take 5 mg by mouth Every 4 (Four) Hours As Needed for Moderate Pain .   • tamsulosin (FLOMAX) 0.4 MG capsule 24 hr capsule Take 1 capsule by mouth Every Evening.   • tobramycin PF (MOIZ) 300 MG/5ML nebulizer solution Take 5 mL by nebulization 2 (two) times a day.   • TRUEplus Lancets 28G misc USE AS DIRECTED   • [DISCONTINUED] atorvastatin (LIPITOR) 20 MG tablet TAKE 1 TABLET BY MOUTH EACH NIGHT AT BEDTIME   • [DISCONTINUED] Bydureon BCise 2 MG/0.85ML auto-injector injection INJECT 2 MG SUBCUTANEOUSLY EVERY WEEK   • [DISCONTINUED] dilTIAZem CD (CARDIZEM CD) 120 MG 24 hr capsule TAKE 1 CAPSULE BY MOUTH EVERY DAY   • [DISCONTINUED] DULoxetine (CYMBALTA) 60 MG capsule TAKE 1 CAPSULE BY MOUTH TWICE DAILY   • [DISCONTINUED] famotidine (PEPCID) 40 MG  "tablet TAKE 1 TABLET BY MOUTH EVERY DAY   • [DISCONTINUED] furosemide (Lasix) 40 MG tablet 1 po 3 days a week, hold if BP < 110/60. Take one potassium with every lasix   • [DISCONTINUED] metoprolol tartrate (LOPRESSOR) 100 MG tablet TAKE 2 TABLETS BY MOUTH EVERY MORNING AND 1 TABLET IN THE EVENING   • [DISCONTINUED] traZODone (DESYREL) 50 MG tablet TAKE 1 TABLET BY MOUTH AT BEDTIME   • iron polysaccharides (NIFEREX) 150 MG capsule Take 1 capsule by mouth Daily.   • levalbuterol (XOPENEX HFA) 45 MCG/ACT inhaler Inhale 1-2 puffs Every 4 (Four) Hours As Needed for Wheezing.   • magnesium oxide (MAGOX) 400 (241.3 Mg) MG tablet tablet Take 400 mg by mouth 3 (Three) Times a Day.   • mometasone-formoterol (DULERA 200) 200-5 MCG/ACT inhaler Inhale 2 puffs 2 (Two) Times a Day.   • potassium chloride 10 MEQ CR tablet Take 1 po 3 days a week with lasix.     No current facility-administered medications on file prior to visit.       Vital Signs:   /55 (BP Location: Right arm, Patient Position: Sitting, Cuff Size: Large Adult)   Pulse 69   Temp 97.9 °F (36.6 °C) (Oral)   Ht 175.3 cm (69\")   Wt 122 kg (270 lb) Comment: Pt reported  SpO2 100% Comment: 2L of O2  BMI 39.87 kg/m²       Physical Exam:  Physical Exam  Vitals and nursing note reviewed.   Constitutional:       General: He is not in acute distress.     Appearance: Normal appearance. He is not ill-appearing, toxic-appearing or diaphoretic.   HENT:      Head: Normocephalic and atraumatic.      Right Ear: Tympanic membrane, ear canal and external ear normal.      Left Ear: Tympanic membrane, ear canal and external ear normal.      Nose: No congestion or rhinorrhea.      Mouth/Throat:      Mouth: Mucous membranes are moist.      Pharynx: Oropharynx is clear. No oropharyngeal exudate or posterior oropharyngeal erythema.   Eyes:      Extraocular Movements: Extraocular movements intact.      Conjunctiva/sclera: Conjunctivae normal.      Pupils: Pupils are equal, " round, and reactive to light.   Cardiovascular:      Rate and Rhythm: Normal rate and regular rhythm.      Heart sounds: Normal heart sounds.   Pulmonary:      Effort: Pulmonary effort is normal.      Breath sounds: Normal breath sounds. No wheezing, rhonchi or rales.   Abdominal:      General: Abdomen is flat.      Palpations: Abdomen is soft.   Musculoskeletal:      Cervical back: Neck supple. No rigidity.   Lymphadenopathy:      Cervical: No cervical adenopathy.   Skin:     General: Skin is warm and dry.   Neurological:      Mental Status: He is alert and oriented to person, place, and time.   Psychiatric:         Mood and Affect: Mood normal.         Behavior: Behavior normal.         Result Review      The following data was reviewed by Kimmy Riley MD on 03/17/2022.  Lab Results   Component Value Date    WBC 12.34 (H) 01/02/2022    HGB 7.8 (L) 01/02/2022    HCT 24.9 (L) 01/02/2022    MCV 92.9 01/02/2022     (H) 01/02/2022     Lab Results   Component Value Date    GLUCOSE 402 (C) 07/27/2021    BUN 15 07/27/2021    CREATININE 1.58 (H) 07/27/2021    EGFRIFNONA 46 (L) 07/27/2021    BCR 9.5 07/27/2021    K 3.9 07/27/2021    CO2 28.6 07/27/2021    CALCIUM 9.4 07/27/2021    ALBUMIN 3.40 (L) 07/27/2021    LABIL2 0.9 (L) 09/30/2020    AST 15 07/27/2021    ALT 12 07/27/2021     Lab Results   Component Value Date    CHOL 183 07/27/2021    CHLPL 165 08/26/2020    TRIG 173 (H) 07/27/2021    HDL 38 (L) 07/27/2021     (H) 07/27/2021     Lab Results   Component Value Date    TSH 0.580 03/17/2020     Lab Results   Component Value Date    HGBA1C 11.1 (H) 08/26/2020     No results found for: PSA                    Assessment and Plan:          Diagnoses and all orders for this visit:    1. Primary insomnia (Primary)  -     traZODone (DESYREL) 100 MG tablet; Take 1 tablet by mouth Every Night.  Dispense: 90 tablet; Refill: 1    2. Hyperlipidemia, unspecified hyperlipidemia type  -     atorvastatin (LIPITOR)  20 MG tablet; Take 1 tablet by mouth Every Night.  Dispense: 90 tablet; Refill: 1    3. Essential (primary) hypertension  -     dilTIAZem CD (CARDIZEM CD) 120 MG 24 hr capsule; Take 1 capsule by mouth Daily.  Dispense: 30 capsule; Refill: 0  -     metoprolol tartrate (LOPRESSOR) 100 MG tablet; Take 1 tablet by mouth 2 (Two) Times a Day.  Dispense: 180 tablet; Refill: 1  -     Comprehensive Metabolic Panel; Future  -     CBC (No Diff); Future    4. Paroxysmal atrial fibrillation (HCC)    5. Iron deficiency anemia secondary to inadequate dietary iron intake  -     ferrous gluconate (FERGON) 324 MG tablet; Take 1 tablet by mouth Daily With Breakfast.  Dispense: 90 tablet; Refill: 1  -     CBC (No Diff); Future  -     Iron Profile; Future    6. Type 2 diabetes mellitus with hyperglycemia, with long-term current use of insulin ([facility])  -     exenatide er (Bydureon BCise) 2 MG/0.85ML auto-injector injection; Inject 0.85 mL under the skin into the appropriate area as directed 1 (One) Time Per Week.  Dispense: 4 mL; Refill: 5  -     Lipid Panel; Future  -     Hemoglobin A1c; Future  -     Microalbumin / Creatinine Urine Ratio - Urine, Clean Catch; Future    7. Localized edema  -     furosemide (Lasix) 40 MG tablet; 1 po 3 days a week, hold if BP < 110/60. Take one potassium with every lasix  Dispense: 40 tablet; Refill: 1    8. Stage 3b chronic kidney disease ([facility])    9. Gastroesophageal reflux disease without esophagitis  -     famotidine (PEPCID) 40 MG tablet; Take 1 tablet by mouth Daily.  Dispense: 90 tablet; Refill: 0    10. Major depressive disorder with single episode, in partial remission ([facility])  -     DULoxetine (CYMBALTA) 60 MG capsule; Take 1 capsule by mouth 2 (Two) Times a Day.  Dispense: 180 capsule; Refill: 0          Follow Up   Return in about 3 months (around 6/17/2022) for Recheck.

## 2022-03-18 ENCOUNTER — TELEPHONE (OUTPATIENT)
Dept: FAMILY MEDICINE CLINIC | Age: 57
End: 2022-03-18

## 2022-03-18 LAB
IRON 24H UR-MRATE: 83 MCG/DL (ref 59–158)
IRON SATN MFR SERPL: 30 % (ref 20–50)
TIBC SERPL-MCNC: 273 MCG/DL (ref 298–536)
TRANSFERRIN SERPL-MCNC: 183 MG/DL (ref 200–360)

## 2022-03-18 NOTE — TELEPHONE ENCOUNTER
Home health called is concerned  pt is c/o more soa and lung sounds are very congested, with a non productive cough . SPo2 96 at rest with O2 but decreases with activity to 90 % I reviewed labs with Home health nurse but informed him if he is concerned then he needs to go to the er at [facility] to be evaluated as his respiratory status changes very easy . I advised that he go to [facility] because he is an established pt with Dr Romano and they also can access his records . Sent to on call MD Dobson as an FYI and also PCP

## 2022-03-21 ENCOUNTER — OFFICE VISIT (OUTPATIENT)
Dept: FAMILY MEDICINE CLINIC | Age: 57
End: 2022-03-21

## 2022-03-21 VITALS
SYSTOLIC BLOOD PRESSURE: 158 MMHG | HEART RATE: 93 BPM | TEMPERATURE: 97.6 F | DIASTOLIC BLOOD PRESSURE: 60 MMHG | HEIGHT: 69 IN | OXYGEN SATURATION: 95 % | BODY MASS INDEX: 39.87 KG/M2

## 2022-03-21 DIAGNOSIS — J44.1 COPD WITH EXACERBATION: Primary | ICD-10-CM

## 2022-03-21 DIAGNOSIS — K21.9 GASTROESOPHAGEAL REFLUX DISEASE WITHOUT ESOPHAGITIS: ICD-10-CM

## 2022-03-21 DIAGNOSIS — R09.81 SINUS CONGESTION: ICD-10-CM

## 2022-03-21 PROCEDURE — 99213 OFFICE O/P EST LOW 20 MIN: CPT | Performed by: FAMILY MEDICINE

## 2022-03-21 RX ORDER — METHYLPREDNISOLONE 4 MG/1
TABLET ORAL
Qty: 1 EACH | Refills: 0 | Status: SHIPPED | OUTPATIENT
Start: 2022-03-21 | End: 2022-04-05

## 2022-03-21 RX ORDER — GUAIFENESIN AND DEXTROMETHORPHAN HYDROBROMIDE 600; 30 MG/1; MG/1
1 TABLET, EXTENDED RELEASE ORAL 2 TIMES DAILY PRN
Qty: 60 TABLET | Refills: 0 | Status: SHIPPED | OUTPATIENT
Start: 2022-03-21 | End: 2022-06-09 | Stop reason: SDUPTHER

## 2022-03-21 RX ORDER — FAMOTIDINE 40 MG/1
40 TABLET, FILM COATED ORAL 2 TIMES DAILY
Qty: 60 TABLET | Refills: 0 | Status: SHIPPED | OUTPATIENT
Start: 2022-03-21 | End: 2022-04-05

## 2022-03-21 NOTE — PROGRESS NOTES
Preston Wallis presents to [facility].    Chief Complaint: SOA  Subjective       History of Present Illness:  HPI  Pt is in today for increased SOA after seeing me last week in the office, his lungs sounded great at his OV, but on Friday he was seen by home health and he was very short of air and they called concerned.  He was referred by nursing to the ER and refused to go.  Today he announces that actually he figured out why he was more short of air.  He has a hole in his hose for his oxygen machine and the hose was not attached appropriately at the end so he felt like he was not getting the oxygen he normally does.  He is feeling better today and has fixed his oxygen machine problem and states that his oxygen levels are good again.  He denies fever other cold symptoms sore throat ear pain chills loss of taste or smell.  He is not vaccinated for Covid and I educated him on the Covid vaccine and encouraged him to consider getting his Covid vaccinations.    Review of Systems:  Review of Systems   Constitutional: Negative for chills, fatigue and fever.   HENT: Negative for congestion, ear discharge and sore throat.    Respiratory: Positive for shortness of breath and wheezing. Negative for cough.    Cardiovascular: Negative for chest pain.   Gastrointestinal: Positive for GERD. Negative for abdominal pain, constipation, diarrhea, nausea and vomiting.   Genitourinary: Negative for flank pain.   Neurological: Negative for dizziness and headache.   Psychiatric/Behavioral: Negative for depressed mood.        Objective   Medical History:  Past Medical History:   • Age-related cognitive decline   • Allergic contact dermatitis   • Allergies   • Anemia   • Bronchiectasis with acute lower respiratory infection (HCC)   • Chest pain   • Chronic bronchitis (HCC)   • Chronic diastolic (congestive) heart failure (HCC)   • Chronic kidney disease   • Chronic respiratory failure with hypoxia (HCC)   •  COPD (chronic obstructive pulmonary disease) ([facility])   • COPD with acute exacerbation ([facility])   • Cough   • Dependence on supplemental oxygen   • Eczema   • Erectile dysfunction    due to organic reasons   • Essential (primary) hypertension   • Fracture    closed fracture of other tarsal and metatarsal bones   • GERD without esophagitis   • High risk medication use   • Hypercholesteremia   • Hypomagnesemia   • Insomnia   • Low back pain   • Major depressive disorder   • Major depressive disorder   • Morbid (severe) obesity due to excess calories ([facility])   • MRSA pneumonia ([facility])   • Muscle weakness   • 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 without evidence of necrosis ([facility])   • Obstructive sleep apnea (adult) (pediatric)   • Other forms of dyspnea   • Other long term (current) drug therapy   • Other pulmonary embolism without acute cor pulmonale ([facility])   • Other specified noninfective gastroenteritis and colitis   • Other spondylosis, lumbar region   • Pain in both knees   • Paroxysmal atrial fibrillation ([facility])   • Peripheral neuropathy    attributed to type 2 diabetes   • Pneumonia, unspecified organism   • Polyneuropathy   • Rash and other nonspecific skin eruption   • Syncope and collapse   • Tachycardia   • Type 1 diabetes mellitus with diabetic chronic kidney disease ([facility])   • Type 2 diabetes mellitus ([facility])   • Unspecified fall, initial encounter     Past Surgical History:   • CHOLECYSTECTOMY   • KNEE SURGERY   • OTHER SURGICAL HISTORY    venous port   • TONSILLECTOMY AND ADENOIDECTOMY      Family History   Problem Relation Age of Onset   • Coronary artery disease Mother    • Hypertension Mother    • Diabetes type II Mother    • Asthma Father    • Cancer Sister      Social History     Tobacco Use   • Smoking status: Former Smoker     Packs/day: 1.00     Years: 6.00     Pack years: 6.00     Types: Cigarettes     Quit date:      Years since quittin.2   • Smokeless  tobacco: Never Used   Substance Use Topics   • Alcohol use: Not Currently       Health Maintenance Due   Topic Date Due   • COLORECTAL CANCER SCREENING  Never done   • ANNUAL PHYSICAL  Never done   • COVID-19 Vaccine (1) Never done   • Hepatitis B (1 of 3 - Risk 3-dose series) Never done   • ZOSTER VACCINE (1 of 2) Never done   • DIABETIC FOOT EXAM  Never done   • DIABETIC EYE EXAM  06/09/2021        Immunization History   Administered Date(s) Administered   • Flu Vaccine Quad PF >36MO 10/18/2016, 10/16/2017, 11/04/2019   • Flu Vaccine Split Quad 11/04/2019   • Influenza Quad Vaccine (Inpatient) 09/19/2013   • Influenza, Unspecified 09/21/2020   • Pneumococcal Polysaccharide (PPSV23) 11/20/1997   • Tdap 09/18/2018       Allergies   Allergen Reactions   • Benadryl [Diphenhydramine] Itching   • Proventil [Albuterol] Other (See Comments)     Mouth sores          Medications:  Current Outpatient Medications on File Prior to Visit   Medication Sig   • acetaminophen (TYLENOL) 500 MG tablet Take 1,000 mg by mouth Every 8 (Eight) Hours As Needed.   • albuterol sulfate  (90 Base) MCG/ACT inhaler Inhale 2 puffs 4 (Four) Times a Day As Needed.   • apixaban (Eliquis) 5 MG tablet tablet take 2 tablets BY MOUTH EVERY DAY   • aspirin 81 MG EC tablet Take 81 mg by mouth Daily.   • atorvastatin (LIPITOR) 20 MG tablet Take 1 tablet by mouth Every Night.   • B-D UF III MINI PEN NEEDLES 31G X 5 MM misc USE AS DIRECTED WITH INSULIN   • Blood Glucose Monitoring Suppl w/Device kit 1 kit Take As Directed. Dx e11.9   • budesonide-formoterol (SYMBICORT) 160-4.5 MCG/ACT inhaler Inhale 2 puffs 2 (two) times a day.   • Calcitrate 950 (200 Ca) MG tablet TAKE 1 TABLET BY MOUTH EVERY DAY   • Cholecalciferol (Vitamin D3) 50 MCG (2000 UT) tablet Take 1 tablet BY MOUTH EVERY MORNING FOR vitamin deficiency   • dapagliflozin (Farxiga) 5 MG tablet tablet Take 1 tablet by mouth Daily.   • Diclofenac Sodium (VOLTAREN) 1 % gel gel    • dilTIAZem CD  (CARDIZEM CD) 120 MG 24 hr capsule Take 1 capsule by mouth Daily.   • docusate sodium (COLACE) 100 MG capsule TAKE 1 CAPSULE BY MOUTH TWICE DAILY   • DULoxetine (CYMBALTA) 60 MG capsule Take 1 capsule by mouth 2 (Two) Times a Day.   • exenatide er (Bydureon BCise) 2 MG/0.85ML auto-injector injection Inject 0.85 mL under the skin into the appropriate area as directed 1 (One) Time Per Week.   • ferrous gluconate (FERGON) 324 MG tablet Take 1 tablet by mouth Daily With Breakfast.   • furosemide (Lasix) 40 MG tablet 1 po 3 days a week, hold if BP < 110/60. Take one potassium with every lasix   • gabapentin (NEURONTIN) 300 MG capsule Take 1 capsule by mouth every night at bedtime.   • glucose blood test strip 1 each by Other route 4 (Four) Times a Day With Meals & at Bedtime. Use as instructed dx e 11.9   • hydrALAZINE (APRESOLINE) 25 MG tablet TAKE 1 TABLET BY MOUTH TWICE DAILY with food   • Insulin Glargine (LANTUS SOLOSTAR) 100 UNIT/ML injection pen Inject 40 Units under the skin into the appropriate area as directed Daily.   • Insulin Lispro (ADMELOG SOLOSTAR SC) Inject  under the skin into the appropriate area as directed. Per SSI, if BS <150 = 0 units, 151-180= 1 unit, 181-210= 2units, 211-240= 3units, 241-270= 4units, 271-300= 5units, 301-330=6 untis, 331-390= 8units, <390 give 9units   • ipratropium (ATROVENT HFA) 17 MCG/ACT inhaler Inhale 2 puffs 4 (Four) Times a Day.   • ipratropium-albuterol (DUO-NEB) 0.5-2.5 mg/3 ml nebulizer Take 3 mL by nebulization Every 4 (Four) Hours As Needed.   • iron polysaccharides (NIFEREX) 150 MG capsule Take 1 capsule by mouth Daily.   • levalbuterol (XOPENEX HFA) 45 MCG/ACT inhaler Inhale 1-2 puffs Every 4 (Four) Hours As Needed for Wheezing.   • magnesium oxide (MAGOX) 400 (241.3 Mg) MG tablet tablet Take 400 mg by mouth 3 (Three) Times a Day.   • metoprolol tartrate (LOPRESSOR) 100 MG tablet Take 1 tablet by mouth 2 (Two) Times a Day.   • mometasone-formoterol (DULERA 200)  "200-5 MCG/ACT inhaler Inhale 2 puffs 2 (Two) Times a Day.   • NIFEdipine XL (Procardia XL) 30 MG 24 hr tablet Take 1 tablet by mouth Daily.   • O2 (OXYGEN) 2 Liter O2 - CONTINUOUS (route: Oxygen)   • oxyCODONE (OXY-IR) 5 MG capsule Take 5 mg by mouth Every 4 (Four) Hours As Needed for Moderate Pain .   • potassium chloride 10 MEQ CR tablet Take 1 po 3 days a week with lasix.   • tamsulosin (FLOMAX) 0.4 MG capsule 24 hr capsule Take 1 capsule by mouth Every Evening.   • tobramycin PF (MOIZ) 300 MG/5ML nebulizer solution Take 5 mL by nebulization 2 (two) times a day.   • traZODone (DESYREL) 100 MG tablet Take 1 tablet by mouth Every Night.   • TRUEplus Lancets 28G misc USE AS DIRECTED   • [DISCONTINUED] famotidine (PEPCID) 40 MG tablet Take 1 tablet by mouth Daily.     No current facility-administered medications on file prior to visit.       Vital Signs:   /60 (BP Location: Right arm, Patient Position: Sitting, Cuff Size: Adult)   Pulse 93   Temp 97.6 °F (36.4 °C) (Oral)   Ht 175.3 cm (69\")   SpO2 95% Comment: 2 liters of 02  BMI 39.87 kg/m²       Physical Exam:  Physical Exam  Vitals and nursing note reviewed.   Constitutional:       General: He is not in acute distress.     Appearance: Normal appearance. He is not ill-appearing, toxic-appearing or diaphoretic.   HENT:      Head: Normocephalic and atraumatic.      Right Ear: Tympanic membrane, ear canal and external ear normal.      Left Ear: Tympanic membrane, ear canal and external ear normal.      Nose: No congestion or rhinorrhea.      Mouth/Throat:      Mouth: Mucous membranes are moist.      Pharynx: Oropharynx is clear. No oropharyngeal exudate or posterior oropharyngeal erythema.   Eyes:      Extraocular Movements: Extraocular movements intact.      Conjunctiva/sclera: Conjunctivae normal.      Pupils: Pupils are equal, round, and reactive to light.   Cardiovascular:      Rate and Rhythm: Normal rate and regular rhythm.      Heart sounds: Normal " heart sounds.   Pulmonary:      Effort: Pulmonary effort is normal.      Breath sounds: Wheezing present. No rhonchi or rales.   Abdominal:      General: Abdomen is flat.      Palpations: Abdomen is soft.   Musculoskeletal:      Cervical back: Neck supple. No rigidity.   Lymphadenopathy:      Cervical: No cervical adenopathy.   Skin:     General: Skin is warm and dry.   Neurological:      Mental Status: He is alert and oriented to person, place, and time.   Psychiatric:         Mood and Affect: Mood normal.         Behavior: Behavior normal.         Result Review      The following data was reviewed by Kimmy Riley MD on 03/21/2022.  Lab Results   Component Value Date    WBC 10.63 03/17/2022    HGB 10.8 (L) 03/17/2022    HCT 35.2 (L) 03/17/2022    MCV 90.0 03/17/2022     03/17/2022     Lab Results   Component Value Date    GLUCOSE 238 (H) 03/17/2022    BUN 17 03/17/2022    CREATININE 1.53 (H) 03/17/2022    EGFRIFNONA 46 (L) 07/27/2021    BCR 11.1 03/17/2022    K 3.5 03/17/2022    CO2 32.7 (H) 03/17/2022    CALCIUM 9.4 03/17/2022    ALBUMIN 3.60 03/17/2022    LABIL2 0.9 (L) 09/30/2020    AST 16 03/17/2022    ALT 12 03/17/2022     Lab Results   Component Value Date    CHOL 131 03/17/2022    CHLPL 165 08/26/2020    TRIG 69 03/17/2022    HDL 65 (H) 03/17/2022    LDL 52 03/17/2022     Lab Results   Component Value Date    TSH 0.580 03/17/2020     Lab Results   Component Value Date    HGBA1C 8.30 (H) 03/17/2022     Lab Results   Component Value Date    PSA 0.725 03/17/2022                       Assessment and Plan:          Diagnoses and all orders for this visit:    1. COPD with exacerbation (HCC) (Primary)  Comments:  Will treat with Medrol dose steroid pack and cough syrup, continue pulmonary toilet with his current inhalers.  Orders:  -     methylPREDNISolone (Medrol) 4 MG dose pack; Take as directed on package instructions.  Dispense: 1 each; Refill: 0  -     guaifenesin-dextromethorphan (MUCINEX DM)   MG tablet sustained-release 12 hour tablet; Take 1 tablet by mouth 2 (Two) Times a Day As Needed (congestion).  Dispense: 60 tablet; Refill: 0    2. Sinus congestion  -     guaifenesin-dextromethorphan (MUCINEX DM)  MG tablet sustained-release 12 hour tablet; Take 1 tablet by mouth 2 (Two) Times a Day As Needed (congestion).  Dispense: 60 tablet; Refill: 0    3. Gastroesophageal reflux disease without esophagitis  Comments:  Worse.  Will increase Pepcid to twice a day dosing  Orders:  -     famotidine (PEPCID) 40 MG tablet; Take 1 tablet by mouth 2 (Two) Times a Day.  Dispense: 60 tablet; Refill: 0          Follow Up   Return if symptoms worsen or fail to improve.

## 2022-03-25 ENCOUNTER — TELEPHONE (OUTPATIENT)
Dept: FAMILY MEDICINE CLINIC | Age: 57
End: 2022-03-25

## 2022-03-25 ENCOUNTER — CLINICAL SUPPORT (OUTPATIENT)
Dept: FAMILY MEDICINE CLINIC | Age: 57
End: 2022-03-25

## 2022-03-25 VITALS — SYSTOLIC BLOOD PRESSURE: 175 MMHG | HEART RATE: 97 BPM | DIASTOLIC BLOOD PRESSURE: 78 MMHG

## 2022-03-25 RX ORDER — HYDRALAZINE HYDROCHLORIDE 25 MG/1
50 TABLET, FILM COATED ORAL 2 TIMES DAILY WITH MEALS
Qty: 180 TABLET | Refills: 1 | Status: SHIPPED | OUTPATIENT
Start: 2022-03-25 | End: 2022-04-05

## 2022-03-25 NOTE — TELEPHONE ENCOUNTER
Home health called yesterday and left a vm reporting pt has wheezing and rhonci  spo2 98 % with O2 , afebrile, pulse normal she reports b/p 180/60,170/62,179/73,211/78 he refused to go to the er and reports that he has taken all his medication

## 2022-03-25 NOTE — TELEPHONE ENCOUNTER
Pt is unable to check b/p at home , he said he feels great I asked if he can come in and have b/p checked at the office .tr for home health nurse [place] [phone]

## 2022-03-25 NOTE — TELEPHONE ENCOUNTER
Pt inf I asked pt if he was coming by here to get his b/p checked and he said no its cold I am going home and [place] with Home health inf    Message to Darinel because pt is currently on Hydralazine 25 mg bid she gave verbal orders to change Hydralazine 50 mg po bid . I called wife jason and informed her and also emphasized to her that if his b/p is greater then 180 or 120 then he needs to go to the er.

## 2022-03-25 NOTE — TELEPHONE ENCOUNTER
Increase his hydralazine to 50 mg tid, needs to check BP's at home and since he cant he needs to be seen either at our office or ER with a BP >180/120. denzel

## 2022-03-31 ENCOUNTER — TELEPHONE (OUTPATIENT)
Dept: FAMILY MEDICINE CLINIC | Age: 57
End: 2022-03-31

## 2022-04-01 NOTE — TELEPHONE ENCOUNTER
Pt is still complaining of insomnia he is taking his trazodone at night he refuses CPAP. He is complaining of  restless leg at night can you increase his gabapentin  Or do you recommend something else? Also home health recommends monthly visits here if that is ok with you ?

## 2022-04-03 DIAGNOSIS — K59.09 OTHER CONSTIPATION: ICD-10-CM

## 2022-04-04 RX ORDER — DOCUSATE SODIUM 100 MG/1
CAPSULE, LIQUID FILLED ORAL
Qty: 180 CAPSULE | Refills: 1 | Status: SHIPPED | OUTPATIENT
Start: 2022-04-04 | End: 2022-09-26 | Stop reason: SDUPTHER

## 2022-04-04 RX ORDER — POTASSIUM CHLORIDE 750 MG/1
TABLET, FILM COATED, EXTENDED RELEASE ORAL
Qty: 40 TABLET | Refills: 1 | Status: SHIPPED | OUTPATIENT
Start: 2022-04-04 | End: 2022-06-02

## 2022-04-05 ENCOUNTER — APPOINTMENT (OUTPATIENT)
Dept: CT IMAGING | Facility: HOSPITAL | Age: 57
End: 2022-04-05

## 2022-04-05 ENCOUNTER — OFFICE VISIT (OUTPATIENT)
Dept: FAMILY MEDICINE CLINIC | Age: 57
End: 2022-04-05

## 2022-04-05 ENCOUNTER — HOSPITAL ENCOUNTER (INPATIENT)
Facility: HOSPITAL | Age: 57
LOS: 6 days | Discharge: HOME HEALTH | End: 2022-04-11
Attending: EMERGENCY MEDICINE | Admitting: HOSPITALIST

## 2022-04-05 ENCOUNTER — APPOINTMENT (OUTPATIENT)
Dept: GENERAL RADIOLOGY | Facility: HOSPITAL | Age: 57
End: 2022-04-05

## 2022-04-05 VITALS
SYSTOLIC BLOOD PRESSURE: 126 MMHG | OXYGEN SATURATION: 61 % | BODY MASS INDEX: 39.87 KG/M2 | HEIGHT: 69 IN | DIASTOLIC BLOOD PRESSURE: 47 MMHG | HEART RATE: 67 BPM

## 2022-04-05 DIAGNOSIS — J44.9 CHRONIC OBSTRUCTIVE PULMONARY DISEASE, UNSPECIFIED COPD TYPE: ICD-10-CM

## 2022-04-05 DIAGNOSIS — E66.9 OBESITY (BMI 30-39.9): Chronic | ICD-10-CM

## 2022-04-05 DIAGNOSIS — I21.4 NON-STEMI (NON-ST ELEVATED MYOCARDIAL INFARCTION): ICD-10-CM

## 2022-04-05 DIAGNOSIS — Z78.9 DECREASED ACTIVITIES OF DAILY LIVING (ADL): ICD-10-CM

## 2022-04-05 DIAGNOSIS — J44.1 COPD WITH EXACERBATION: ICD-10-CM

## 2022-04-05 DIAGNOSIS — J96.21 ACUTE ON CHRONIC RESPIRATORY FAILURE WITH HYPOXIA: ICD-10-CM

## 2022-04-05 DIAGNOSIS — I10 ESSENTIAL (PRIMARY) HYPERTENSION: ICD-10-CM

## 2022-04-05 DIAGNOSIS — J18.9 MULTIFOCAL PNEUMONIA: Primary | ICD-10-CM

## 2022-04-05 DIAGNOSIS — U07.1 COVID-19: ICD-10-CM

## 2022-04-05 DIAGNOSIS — J44.1 CHRONIC OBSTRUCTIVE PULMONARY DISEASE WITH ACUTE EXACERBATION: ICD-10-CM

## 2022-04-05 DIAGNOSIS — J96.01 ACUTE RESPIRATORY FAILURE WITH HYPOXIA: Primary | ICD-10-CM

## 2022-04-05 DIAGNOSIS — G47.33 OBSTRUCTIVE SLEEP APNEA (ADULT) (PEDIATRIC): ICD-10-CM

## 2022-04-05 DIAGNOSIS — R13.12 OROPHARYNGEAL DYSPHAGIA: ICD-10-CM

## 2022-04-05 DIAGNOSIS — I50.32 CHRONIC DIASTOLIC (CONGESTIVE) HEART FAILURE: ICD-10-CM

## 2022-04-05 DIAGNOSIS — E10.22 TYPE 1 DIABETES MELLITUS WITH STAGE 3A CHRONIC KIDNEY DISEASE: ICD-10-CM

## 2022-04-05 DIAGNOSIS — N18.31 TYPE 1 DIABETES MELLITUS WITH STAGE 3A CHRONIC KIDNEY DISEASE: ICD-10-CM

## 2022-04-05 DIAGNOSIS — R26.2 DIFFICULTY WALKING: ICD-10-CM

## 2022-04-05 LAB
ALBUMIN SERPL-MCNC: 3.7 G/DL (ref 3.5–5.2)
ALBUMIN/GLOB SERPL: 0.9 G/DL
ALP SERPL-CCNC: 181 U/L (ref 39–117)
ALT SERPL W P-5'-P-CCNC: 14 U/L (ref 1–41)
ANION GAP SERPL CALCULATED.3IONS-SCNC: 10.2 MMOL/L (ref 5–15)
ANION GAP SERPL CALCULATED.3IONS-SCNC: 14 MMOL/L (ref 5–15)
AST SERPL-CCNC: 14 U/L (ref 1–40)
BASOPHILS # BLD AUTO: 0.03 10*3/MM3 (ref 0–0.2)
BASOPHILS # BLD AUTO: 0.06 10*3/MM3 (ref 0–0.2)
BASOPHILS NFR BLD AUTO: 0.2 % (ref 0–1.5)
BASOPHILS NFR BLD AUTO: 0.4 % (ref 0–1.5)
BILIRUB SERPL-MCNC: 0.4 MG/DL (ref 0–1.2)
BUN SERPL-MCNC: 40 MG/DL (ref 6–20)
BUN SERPL-MCNC: 43 MG/DL (ref 6–20)
BUN/CREAT SERPL: 21 (ref 7–25)
BUN/CREAT SERPL: 22 (ref 7–25)
CALCIUM SPEC-SCNC: 9.2 MG/DL (ref 8.6–10.5)
CALCIUM SPEC-SCNC: 9.4 MG/DL (ref 8.6–10.5)
CHLORIDE SERPL-SCNC: 94 MMOL/L (ref 98–107)
CHLORIDE SERPL-SCNC: 95 MMOL/L (ref 98–107)
CO2 SERPL-SCNC: 27 MMOL/L (ref 22–29)
CO2 SERPL-SCNC: 29.8 MMOL/L (ref 22–29)
CREAT SERPL-MCNC: 1.82 MG/DL (ref 0.76–1.27)
CREAT SERPL-MCNC: 2.05 MG/DL (ref 0.76–1.27)
D-LACTATE SERPL-SCNC: 1.2 MMOL/L (ref 0.5–2)
DEPRECATED RDW RBC AUTO: 42 FL (ref 37–54)
DEPRECATED RDW RBC AUTO: 42.3 FL (ref 37–54)
EGFRCR SERPLBLD CKD-EPI 2021: 37.3 ML/MIN/1.73
EGFRCR SERPLBLD CKD-EPI 2021: 43.1 ML/MIN/1.73
EOSINOPHIL # BLD AUTO: 0.01 10*3/MM3 (ref 0–0.4)
EOSINOPHIL # BLD AUTO: 0.06 10*3/MM3 (ref 0–0.4)
EOSINOPHIL NFR BLD AUTO: 0.1 % (ref 0.3–6.2)
EOSINOPHIL NFR BLD AUTO: 0.4 % (ref 0.3–6.2)
ERYTHROCYTE [DISTWIDTH] IN BLOOD BY AUTOMATED COUNT: 12.6 % (ref 12.3–15.4)
ERYTHROCYTE [DISTWIDTH] IN BLOOD BY AUTOMATED COUNT: 12.7 % (ref 12.3–15.4)
FERRITIN SERPL-MCNC: 146.8 NG/ML (ref 30–400)
GLOBULIN UR ELPH-MCNC: 4.3 GM/DL
GLUCOSE BLDC GLUCOMTR-MCNC: 217 MG/DL (ref 70–130)
GLUCOSE BLDC GLUCOMTR-MCNC: 244 MG/DL (ref 70–99)
GLUCOSE SERPL-MCNC: 231 MG/DL (ref 65–99)
GLUCOSE SERPL-MCNC: 279 MG/DL (ref 65–99)
HCT VFR BLD AUTO: 32.7 % (ref 37.5–51)
HCT VFR BLD AUTO: 33.4 % (ref 37.5–51)
HGB BLD-MCNC: 10 G/DL (ref 13–17.7)
HGB BLD-MCNC: 10.2 G/DL (ref 13–17.7)
HOLD SPECIMEN: NORMAL
IMM GRANULOCYTES # BLD AUTO: 0.14 10*3/MM3 (ref 0–0.05)
IMM GRANULOCYTES # BLD AUTO: 0.25 10*3/MM3 (ref 0–0.05)
IMM GRANULOCYTES NFR BLD AUTO: 1 % (ref 0–0.5)
IMM GRANULOCYTES NFR BLD AUTO: 1.7 % (ref 0–0.5)
IRON 24H UR-MRATE: 22 MCG/DL (ref 59–158)
IRON SATN MFR SERPL: 7 % (ref 20–50)
LYMPHOCYTES # BLD AUTO: 0.56 10*3/MM3 (ref 0.7–3.1)
LYMPHOCYTES # BLD AUTO: 1.27 10*3/MM3 (ref 0.7–3.1)
LYMPHOCYTES NFR BLD AUTO: 4.2 % (ref 19.6–45.3)
LYMPHOCYTES NFR BLD AUTO: 8.7 % (ref 19.6–45.3)
MCH RBC QN AUTO: 27.9 PG (ref 26.6–33)
MCH RBC QN AUTO: 28 PG (ref 26.6–33)
MCHC RBC AUTO-ENTMCNC: 30.5 G/DL (ref 31.5–35.7)
MCHC RBC AUTO-ENTMCNC: 30.6 G/DL (ref 31.5–35.7)
MCV RBC AUTO: 91.3 FL (ref 79–97)
MCV RBC AUTO: 91.6 FL (ref 79–97)
MONOCYTES # BLD AUTO: 0.18 10*3/MM3 (ref 0.1–0.9)
MONOCYTES # BLD AUTO: 1.18 10*3/MM3 (ref 0.1–0.9)
MONOCYTES NFR BLD AUTO: 1.3 % (ref 5–12)
MONOCYTES NFR BLD AUTO: 8.1 % (ref 5–12)
NEUTROPHILS NFR BLD AUTO: 11.74 10*3/MM3 (ref 1.7–7)
NEUTROPHILS NFR BLD AUTO: 12.57 10*3/MM3 (ref 1.7–7)
NEUTROPHILS NFR BLD AUTO: 80.7 % (ref 42.7–76)
NEUTROPHILS NFR BLD AUTO: 93.2 % (ref 42.7–76)
NRBC BLD AUTO-RTO: 0 /100 WBC (ref 0–0.2)
NRBC BLD AUTO-RTO: 0 /100 WBC (ref 0–0.2)
NT-PROBNP SERPL-MCNC: 1966 PG/ML (ref 0–900)
PLATELET # BLD AUTO: 340 10*3/MM3 (ref 140–450)
PLATELET # BLD AUTO: 346 10*3/MM3 (ref 140–450)
PMV BLD AUTO: 8.8 FL (ref 6–12)
PMV BLD AUTO: 9.1 FL (ref 6–12)
POTASSIUM SERPL-SCNC: 4.4 MMOL/L (ref 3.5–5.2)
POTASSIUM SERPL-SCNC: 5.4 MMOL/L (ref 3.5–5.2)
PROT SERPL-MCNC: 8 G/DL (ref 6–8.5)
QT INTERVAL: 443 MS
RBC # BLD AUTO: 3.57 10*6/MM3 (ref 4.14–5.8)
RBC # BLD AUTO: 3.66 10*6/MM3 (ref 4.14–5.8)
SODIUM SERPL-SCNC: 135 MMOL/L (ref 136–145)
SODIUM SERPL-SCNC: 135 MMOL/L (ref 136–145)
TIBC SERPL-MCNC: 304 MCG/DL (ref 298–536)
TRANSFERRIN SERPL-MCNC: 204 MG/DL (ref 200–360)
TROPONIN T SERPL-MCNC: 0.13 NG/ML (ref 0–0.03)
WBC NRBC COR # BLD: 13.49 10*3/MM3 (ref 3.4–10.8)
WBC NRBC COR # BLD: 14.56 10*3/MM3 (ref 3.4–10.8)
WHOLE BLOOD HOLD SPECIMEN: NORMAL

## 2022-04-05 PROCEDURE — 83540 ASSAY OF IRON: CPT | Performed by: HOSPITALIST

## 2022-04-05 PROCEDURE — 86738 MYCOPLASMA ANTIBODY: CPT | Performed by: NURSE PRACTITIONER

## 2022-04-05 PROCEDURE — 80053 COMPREHEN METABOLIC PANEL: CPT | Performed by: EMERGENCY MEDICINE

## 2022-04-05 PROCEDURE — 25010000002 CEFTRIAXONE PER 250 MG: Performed by: EMERGENCY MEDICINE

## 2022-04-05 PROCEDURE — 25010000002 FUROSEMIDE PER 20 MG: Performed by: EMERGENCY MEDICINE

## 2022-04-05 PROCEDURE — 84484 ASSAY OF TROPONIN QUANT: CPT | Performed by: EMERGENCY MEDICINE

## 2022-04-05 PROCEDURE — 94761 N-INVAS EAR/PLS OXIMETRY MLT: CPT

## 2022-04-05 PROCEDURE — 99214 OFFICE O/P EST MOD 30 MIN: CPT | Performed by: FAMILY MEDICINE

## 2022-04-05 PROCEDURE — 93010 ELECTROCARDIOGRAM REPORT: CPT | Performed by: INTERNAL MEDICINE

## 2022-04-05 PROCEDURE — U0004 COV-19 TEST NON-CDC HGH THRU: HCPCS | Performed by: EMERGENCY MEDICINE

## 2022-04-05 PROCEDURE — 94799 UNLISTED PULMONARY SVC/PX: CPT

## 2022-04-05 PROCEDURE — 86713 LEGIONELLA ANTIBODY: CPT | Performed by: HOSPITALIST

## 2022-04-05 PROCEDURE — 63710000001 INSULIN DETEMIR PER 5 UNITS: Performed by: HOSPITALIST

## 2022-04-05 PROCEDURE — 86603 ADENOVIRUS ANTIBODY: CPT | Performed by: HOSPITALIST

## 2022-04-05 PROCEDURE — 99285 EMERGENCY DEPT VISIT HI MDM: CPT

## 2022-04-05 PROCEDURE — 84466 ASSAY OF TRANSFERRIN: CPT | Performed by: HOSPITALIST

## 2022-04-05 PROCEDURE — 85025 COMPLETE CBC W/AUTO DIFF WBC: CPT | Performed by: EMERGENCY MEDICINE

## 2022-04-05 PROCEDURE — 82728 ASSAY OF FERRITIN: CPT | Performed by: HOSPITALIST

## 2022-04-05 PROCEDURE — 99223 1ST HOSP IP/OBS HIGH 75: CPT | Performed by: HOSPITALIST

## 2022-04-05 PROCEDURE — 25010000002 METHYLPREDNISOLONE PER 125 MG: Performed by: EMERGENCY MEDICINE

## 2022-04-05 PROCEDURE — 25010000002 AZITHROMYCIN PER 500 MG: Performed by: EMERGENCY MEDICINE

## 2022-04-05 PROCEDURE — 82962 GLUCOSE BLOOD TEST: CPT | Performed by: FAMILY MEDICINE

## 2022-04-05 PROCEDURE — 71250 CT THORAX DX C-: CPT

## 2022-04-05 PROCEDURE — 84484 ASSAY OF TROPONIN QUANT: CPT | Performed by: HOSPITALIST

## 2022-04-05 PROCEDURE — 86631 CHLAMYDIA ANTIBODY: CPT | Performed by: HOSPITALIST

## 2022-04-05 PROCEDURE — 85025 COMPLETE CBC W/AUTO DIFF WBC: CPT | Performed by: HOSPITALIST

## 2022-04-05 PROCEDURE — 94640 AIRWAY INHALATION TREATMENT: CPT

## 2022-04-05 PROCEDURE — 83605 ASSAY OF LACTIC ACID: CPT | Performed by: EMERGENCY MEDICINE

## 2022-04-05 PROCEDURE — 71045 X-RAY EXAM CHEST 1 VIEW: CPT

## 2022-04-05 PROCEDURE — 86738 MYCOPLASMA ANTIBODY: CPT | Performed by: HOSPITALIST

## 2022-04-05 PROCEDURE — 87040 BLOOD CULTURE FOR BACTERIA: CPT | Performed by: EMERGENCY MEDICINE

## 2022-04-05 PROCEDURE — 63710000001 INSULIN LISPRO (HUMAN) PER 5 UNITS: Performed by: HOSPITALIST

## 2022-04-05 PROCEDURE — 93005 ELECTROCARDIOGRAM TRACING: CPT | Performed by: EMERGENCY MEDICINE

## 2022-04-05 PROCEDURE — 83880 ASSAY OF NATRIURETIC PEPTIDE: CPT | Performed by: EMERGENCY MEDICINE

## 2022-04-05 PROCEDURE — 82962 GLUCOSE BLOOD TEST: CPT

## 2022-04-05 RX ORDER — METHYLPREDNISOLONE SODIUM SUCCINATE 125 MG/2ML
125 INJECTION, POWDER, LYOPHILIZED, FOR SOLUTION INTRAMUSCULAR; INTRAVENOUS ONCE
Status: COMPLETED | OUTPATIENT
Start: 2022-04-05 | End: 2022-04-05

## 2022-04-05 RX ORDER — GUAIFENESIN 600 MG/1
1200 TABLET, EXTENDED RELEASE ORAL EVERY 12 HOURS SCHEDULED
Status: DISCONTINUED | OUTPATIENT
Start: 2022-04-05 | End: 2022-04-11 | Stop reason: HOSPADM

## 2022-04-05 RX ORDER — SODIUM CHLORIDE 0.9 % (FLUSH) 0.9 %
10 SYRINGE (ML) INJECTION AS NEEDED
Status: DISCONTINUED | OUTPATIENT
Start: 2022-04-05 | End: 2022-04-11 | Stop reason: HOSPADM

## 2022-04-05 RX ORDER — IPRATROPIUM BROMIDE AND ALBUTEROL SULFATE 2.5; .5 MG/3ML; MG/3ML
3 SOLUTION RESPIRATORY (INHALATION) ONCE
Status: COMPLETED | OUTPATIENT
Start: 2022-04-05 | End: 2022-04-05

## 2022-04-05 RX ORDER — ARFORMOTEROL TARTRATE 15 UG/2ML
15 SOLUTION RESPIRATORY (INHALATION)
Status: DISCONTINUED | OUTPATIENT
Start: 2022-04-05 | End: 2022-04-11 | Stop reason: HOSPADM

## 2022-04-05 RX ORDER — HYDRALAZINE HYDROCHLORIDE 25 MG/1
25 TABLET, FILM COATED ORAL 2 TIMES DAILY
COMMUNITY
Start: 2022-03-25 | End: 2022-04-18 | Stop reason: SDUPTHER

## 2022-04-05 RX ORDER — ASPIRIN 81 MG/1
324 TABLET, CHEWABLE ORAL ONCE
Status: COMPLETED | OUTPATIENT
Start: 2022-04-05 | End: 2022-04-05

## 2022-04-05 RX ORDER — GABAPENTIN 300 MG/1
300 CAPSULE ORAL NIGHTLY
Status: DISCONTINUED | OUTPATIENT
Start: 2022-04-05 | End: 2022-04-11 | Stop reason: HOSPADM

## 2022-04-05 RX ORDER — DEXTROSE MONOHYDRATE 100 MG/ML
25 INJECTION, SOLUTION INTRAVENOUS
Status: DISCONTINUED | OUTPATIENT
Start: 2022-04-05 | End: 2022-04-11 | Stop reason: HOSPADM

## 2022-04-05 RX ORDER — FUROSEMIDE 10 MG/ML
40 INJECTION INTRAMUSCULAR; INTRAVENOUS
Status: DISCONTINUED | OUTPATIENT
Start: 2022-04-06 | End: 2022-04-07

## 2022-04-05 RX ORDER — ATORVASTATIN CALCIUM 20 MG/1
20 TABLET, FILM COATED ORAL NIGHTLY
Status: DISCONTINUED | OUTPATIENT
Start: 2022-04-05 | End: 2022-04-11 | Stop reason: HOSPADM

## 2022-04-05 RX ORDER — BUDESONIDE 0.5 MG/2ML
0.5 INHALANT ORAL
Status: DISCONTINUED | OUTPATIENT
Start: 2022-04-05 | End: 2022-04-11 | Stop reason: HOSPADM

## 2022-04-05 RX ORDER — METOPROLOL TARTRATE 50 MG/1
100 TABLET, FILM COATED ORAL 2 TIMES DAILY
Status: DISCONTINUED | OUTPATIENT
Start: 2022-04-05 | End: 2022-04-11 | Stop reason: HOSPADM

## 2022-04-05 RX ORDER — CEFTRIAXONE SODIUM 1 G/50ML
1 INJECTION, SOLUTION INTRAVENOUS EVERY 24 HOURS
Status: DISCONTINUED | OUTPATIENT
Start: 2022-04-05 | End: 2022-04-05

## 2022-04-05 RX ORDER — TRAZODONE HYDROCHLORIDE 100 MG/1
100 TABLET ORAL NIGHTLY
Status: DISCONTINUED | OUTPATIENT
Start: 2022-04-05 | End: 2022-04-11 | Stop reason: HOSPADM

## 2022-04-05 RX ORDER — FAMOTIDINE 40 MG/1
40 TABLET, FILM COATED ORAL 2 TIMES DAILY
COMMUNITY
Start: 2022-03-25 | End: 2022-04-18 | Stop reason: SDUPTHER

## 2022-04-05 RX ORDER — METHYLPREDNISOLONE SODIUM SUCCINATE 40 MG/ML
40 INJECTION, POWDER, LYOPHILIZED, FOR SOLUTION INTRAMUSCULAR; INTRAVENOUS EVERY 12 HOURS
Status: DISCONTINUED | OUTPATIENT
Start: 2022-04-06 | End: 2022-04-09

## 2022-04-05 RX ORDER — FERROUS SULFATE 325(65) MG
325 TABLET ORAL
Status: DISCONTINUED | OUTPATIENT
Start: 2022-04-06 | End: 2022-04-11 | Stop reason: HOSPADM

## 2022-04-05 RX ORDER — IPRATROPIUM BROMIDE AND ALBUTEROL SULFATE 2.5; .5 MG/3ML; MG/3ML
3 SOLUTION RESPIRATORY (INHALATION)
Status: DISCONTINUED | OUTPATIENT
Start: 2022-04-05 | End: 2022-04-11 | Stop reason: HOSPADM

## 2022-04-05 RX ORDER — HYDRALAZINE HYDROCHLORIDE 25 MG/1
25 TABLET, FILM COATED ORAL EVERY 12 HOURS SCHEDULED
Status: DISCONTINUED | OUTPATIENT
Start: 2022-04-05 | End: 2022-04-11 | Stop reason: HOSPADM

## 2022-04-05 RX ORDER — ASPIRIN 81 MG/1
81 TABLET ORAL DAILY
Status: DISCONTINUED | OUTPATIENT
Start: 2022-04-06 | End: 2022-04-05 | Stop reason: SDUPTHER

## 2022-04-05 RX ORDER — CEFTRIAXONE SODIUM 1 G/50ML
1 INJECTION, SOLUTION INTRAVENOUS EVERY 24 HOURS
Status: DISCONTINUED | OUTPATIENT
Start: 2022-04-06 | End: 2022-04-06

## 2022-04-05 RX ORDER — NICOTINE POLACRILEX 4 MG
15 LOZENGE BUCCAL
Status: DISCONTINUED | OUTPATIENT
Start: 2022-04-05 | End: 2022-04-11 | Stop reason: HOSPADM

## 2022-04-05 RX ORDER — GUAIFENESIN AND DEXTROMETHORPHAN HYDROBROMIDE 600; 30 MG/1; MG/1
1 TABLET, EXTENDED RELEASE ORAL 2 TIMES DAILY PRN
Status: DISCONTINUED | OUTPATIENT
Start: 2022-04-05 | End: 2022-04-11 | Stop reason: HOSPADM

## 2022-04-05 RX ORDER — FUROSEMIDE 40 MG/1
40 TABLET ORAL DAILY
Status: ON HOLD | COMMUNITY
Start: 2022-04-01 | End: 2022-04-11 | Stop reason: SDUPTHER

## 2022-04-05 RX ORDER — DILTIAZEM HYDROCHLORIDE 120 MG/1
120 CAPSULE, COATED, EXTENDED RELEASE ORAL DAILY
Status: DISCONTINUED | OUTPATIENT
Start: 2022-04-06 | End: 2022-04-11 | Stop reason: HOSPADM

## 2022-04-05 RX ORDER — CEFTRIAXONE SODIUM 1 G/50ML
1 INJECTION, SOLUTION INTRAVENOUS ONCE
Status: COMPLETED | OUTPATIENT
Start: 2022-04-05 | End: 2022-04-05

## 2022-04-05 RX ORDER — FUROSEMIDE 10 MG/ML
60 INJECTION INTRAMUSCULAR; INTRAVENOUS ONCE
Status: COMPLETED | OUTPATIENT
Start: 2022-04-05 | End: 2022-04-05

## 2022-04-05 RX ORDER — IBUPROFEN 200 MG
CAPSULE ORAL
Qty: 90 TABLET | Refills: 0 | Status: SHIPPED | OUTPATIENT
Start: 2022-04-05 | End: 2022-06-02 | Stop reason: SDUPTHER

## 2022-04-05 RX ORDER — FAMOTIDINE 20 MG/1
20 TABLET, FILM COATED ORAL
Status: DISCONTINUED | OUTPATIENT
Start: 2022-04-06 | End: 2022-04-11 | Stop reason: HOSPADM

## 2022-04-05 RX ORDER — ASPIRIN 81 MG/1
81 TABLET ORAL DAILY
Status: DISCONTINUED | OUTPATIENT
Start: 2022-04-06 | End: 2022-04-11 | Stop reason: HOSPADM

## 2022-04-05 RX ORDER — METHYLPREDNISOLONE SODIUM SUCCINATE 40 MG/ML
40 INJECTION, POWDER, LYOPHILIZED, FOR SOLUTION INTRAMUSCULAR; INTRAVENOUS EVERY 12 HOURS
Status: DISCONTINUED | OUTPATIENT
Start: 2022-04-05 | End: 2022-04-05

## 2022-04-05 RX ORDER — IPRATROPIUM BROMIDE AND ALBUTEROL SULFATE 2.5; .5 MG/3ML; MG/3ML
3 SOLUTION RESPIRATORY (INHALATION) EVERY 4 HOURS PRN
Status: DISCONTINUED | OUTPATIENT
Start: 2022-04-05 | End: 2022-04-11 | Stop reason: HOSPADM

## 2022-04-05 RX ORDER — DULOXETIN HYDROCHLORIDE 30 MG/1
60 CAPSULE, DELAYED RELEASE ORAL 2 TIMES DAILY
Status: DISCONTINUED | OUTPATIENT
Start: 2022-04-05 | End: 2022-04-11 | Stop reason: HOSPADM

## 2022-04-05 RX ORDER — TAMSULOSIN HYDROCHLORIDE 0.4 MG/1
0.4 CAPSULE ORAL EVERY EVENING
Status: DISCONTINUED | OUTPATIENT
Start: 2022-04-05 | End: 2022-04-11 | Stop reason: HOSPADM

## 2022-04-05 RX ADMIN — Medication 10 ML: at 23:26

## 2022-04-05 RX ADMIN — GUAIFENESIN 1200 MG: 600 TABLET ORAL at 23:27

## 2022-04-05 RX ADMIN — TRAZODONE HYDROCHLORIDE 100 MG: 100 TABLET ORAL at 23:33

## 2022-04-05 RX ADMIN — IPRATROPIUM BROMIDE AND ALBUTEROL SULFATE 3 ML: 2.5; .5 SOLUTION RESPIRATORY (INHALATION) at 16:57

## 2022-04-05 RX ADMIN — IPRATROPIUM BROMIDE AND ALBUTEROL SULFATE 3 ML: 2.5; .5 SOLUTION RESPIRATORY (INHALATION) at 20:52

## 2022-04-05 RX ADMIN — FUROSEMIDE 60 MG: 10 INJECTION, SOLUTION INTRAMUSCULAR; INTRAVENOUS at 16:53

## 2022-04-05 RX ADMIN — CEFTRIAXONE SODIUM 1 G: 1 INJECTION, SOLUTION INTRAVENOUS at 19:33

## 2022-04-05 RX ADMIN — TAMSULOSIN HYDROCHLORIDE 0.4 MG: 0.4 CAPSULE ORAL at 23:27

## 2022-04-05 RX ADMIN — GABAPENTIN 300 MG: 300 CAPSULE ORAL at 23:26

## 2022-04-05 RX ADMIN — DULOXETINE HYDROCHLORIDE 60 MG: 30 CAPSULE, DELAYED RELEASE ORAL at 23:27

## 2022-04-05 RX ADMIN — ATORVASTATIN CALCIUM 20 MG: 20 TABLET, FILM COATED ORAL at 23:27

## 2022-04-05 RX ADMIN — INSULIN LISPRO 4 UNITS: 100 INJECTION, SOLUTION INTRAVENOUS; SUBCUTANEOUS at 23:26

## 2022-04-05 RX ADMIN — AZITHROMYCIN 500 MG: 500 INJECTION, POWDER, LYOPHILIZED, FOR SOLUTION INTRAVENOUS at 20:05

## 2022-04-05 RX ADMIN — ASPIRIN 81 MG 324 MG: 81 TABLET ORAL at 16:54

## 2022-04-05 RX ADMIN — METHYLPREDNISOLONE SODIUM SUCCINATE 125 MG: 125 INJECTION, POWDER, FOR SOLUTION INTRAMUSCULAR; INTRAVENOUS at 16:53

## 2022-04-05 RX ADMIN — HYDRALAZINE HYDROCHLORIDE 25 MG: 25 TABLET, FILM COATED ORAL at 23:28

## 2022-04-05 RX ADMIN — APIXABAN 5 MG: 5 TABLET, FILM COATED ORAL at 23:27

## 2022-04-05 RX ADMIN — INSULIN DETEMIR 40 UNITS: 100 INJECTION, SOLUTION SUBCUTANEOUS at 23:26

## 2022-04-05 NOTE — ED PROVIDER NOTES
Time: 7:32 PM EDT  Arrived by: EMS  Chief Complaint: Shortness of breath   History provided by: Patient  History is limited by: N/A     History of Present Illness:  Patient is a 56 y.o.  male that presents to the emergency department with shortness of breath.  Patient reports that she was seen by her primary care provider last week and was placed on steroids.  Patient states she can feel better that the steroids have worn off she feels like it is coming back.  Patient states that she finished her steroids this past Sunday.  Patient reports a lot of nasal congestion.  She also admits to some chest congestion with a cough.  Patient reports mostly nonproductive.  She denies any fevers.  Patient does report that she is on home oxygen at 2 L nasal cannula.  Patient denies any other complaints.    HPI    Patient Care Team  Primary Care Provider: Kimmy Riley MD    Past Medical History:     Allergies   Allergen Reactions   • Benadryl [Diphenhydramine] Itching   • Proventil [Albuterol] Other (See Comments)     Mouth sores       Past Medical History:   Diagnosis Date   • Age-related cognitive decline    • Allergic contact dermatitis    • Allergies    • Anemia    • Bronchiectasis with acute lower respiratory infection (HCC)    • Chest pain    • Chronic bronchitis (HCC)    • Chronic diastolic (congestive) heart failure (HCC)    • Chronic kidney disease    • Chronic respiratory failure with hypoxia (HCC)    • COPD (chronic obstructive pulmonary disease) (HCC)    • COPD with acute exacerbation (HCC)    • Cough    • Dependence on supplemental oxygen    • Eczema    • Erectile dysfunction     due to organic reasons   • Essential (primary) hypertension    • Fracture     closed fracture of other tarsal and metatarsal bones   • GERD without esophagitis    • High risk medication use    • Hypercholesteremia    • Hypomagnesemia    • Insomnia    • Low back pain    • Major depressive disorder    • Major depressive disorder    •  Morbid (severe) obesity due to excess calories ([facility])    • MRSA pneumonia ([facility])    • Muscle weakness    • 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 without evidence of necrosis ([facility])    • Obstructive sleep apnea (adult) (pediatric)    • Other forms of dyspnea    • Other long term (current) drug therapy    • Other pulmonary embolism without acute cor pulmonale ([facility])    • Other specified noninfective gastroenteritis and colitis    • Other spondylosis, lumbar region    • Pain in both knees    • Paroxysmal atrial fibrillation (HCC)    • Peripheral neuropathy     attributed to type 2 diabetes   • Pneumonia, unspecified organism    • Polyneuropathy    • Rash and other nonspecific skin eruption    • Syncope and collapse    • Tachycardia    • Type 1 diabetes mellitus with diabetic chronic kidney disease (HCC)    • Type 2 diabetes mellitus (HCC)    • Unspecified fall, initial encounter      Past Surgical History:   Procedure Laterality Date   • CHOLECYSTECTOMY     • KNEE SURGERY Left    • OTHER SURGICAL HISTORY Left     venous port   • TONSILLECTOMY AND ADENOIDECTOMY       Family History   Problem Relation Age of Onset   • Coronary artery disease Mother    • Hypertension Mother    • Diabetes type II Mother    • Asthma Father    • Cancer Sister        Home Medications:  Prior to Admission medications    Medication Sig Start Date End Date Taking? Authorizing Provider   acetaminophen (TYLENOL) 500 MG tablet Take 1,000 mg by mouth Every 8 (Eight) Hours As Needed. 12/30/21   Breann Shukla MD   albuterol sulfate  (90 Base) MCG/ACT inhaler Inhale 2 puffs 4 (Four) Times a Day As Needed.    ProviderBreann MD   apixaban (Eliquis) 5 MG tablet tablet take 2 tablets BY MOUTH EVERY DAY 3/14/22   Kimmy Riley MD   aspirin 81 MG EC tablet Take 81 mg by mouth Daily.    Breann Shukla MD   atorvastatin (LIPITOR) 20 MG tablet Take 1 tablet by mouth Every Night.  3/17/22   Kimmy Riley MD   B-D UF III MINI PEN NEEDLES 31G X 5 MM misc USE AS DIRECTED WITH INSULIN 8/16/21   Kimmy Riley MD   Blood Glucose Monitoring Suppl w/Device kit 1 kit Take As Directed. Dx e11.9 2/3/22   Kimmy Riley MD   budesonide-formoterol (SYMBICORT) 160-4.5 MCG/ACT inhaler Inhale 2 puffs 2 (two) times a day.    Breann Shukla MD   calcium citrate (CALCITRATE) 950 (200 Ca) MG tablet TAKE 1 TABLET BY MOUTH EVERY DAY 4/5/22   Kimmy Riley MD   Cholecalciferol (Vitamin D3) 50 MCG (2000 UT) tablet Take 1 tablet BY MOUTH EVERY MORNING FOR vitamin deficiency 3/14/22   Kimmy Riley MD   dapagliflozin (Farxiga) 5 MG tablet tablet Take 1 tablet by mouth Daily. 2/28/22   Kimmy Riley MD   Diclofenac Sodium (VOLTAREN) 1 % gel gel  9/11/21   Breann Shukla MD   dilTIAZem CD (CARDIZEM CD) 120 MG 24 hr capsule Take 1 capsule by mouth Daily. 3/17/22   Kimmy Riley MD   docusate sodium (COLACE) 100 MG capsule TAKE 1 CAPSULE BY MOUTH TWICE DAILY 4/4/22   Kimmy Riley MD   DULoxetine (CYMBALTA) 60 MG capsule Take 1 capsule by mouth 2 (Two) Times a Day. 3/17/22   Kimmy Riley MD   exenatide er (Bydureon BCise) 2 MG/0.85ML auto-injector injection Inject 0.85 mL under the skin into the appropriate area as directed 1 (One) Time Per Week. 3/17/22   Kimmy Riley MD   FAMOTIDINE PO Take 20 mg by mouth 2 (Two) Times a Day. 3/25/22   Breann Shukla MD   ferrous gluconate (FERGON) 324 MG tablet Take 1 tablet by mouth Daily With Breakfast. 3/17/22   Kimmy Riley MD   furosemide (Lasix) 40 MG tablet Take 40 mg by mouth Daily. 4/1/22   Breann Shukla MD   gabapentin (NEURONTIN) 300 MG capsule Take 1 capsule by mouth every night at bedtime. 12/15/21   Kimmy Riley MD   glucose blood test strip 1 each by Other route 4 (Four) Times a Day With Meals & at Bedtime. Use as instructed dx e 11.9 2/3/22   Kimmy Riley  MD [place]   guaifenesin-dextromethorphan (MUCINEX DM)  MG tablet sustained-release 12 hour tablet Take 1 tablet by mouth 2 (Two) Times a Day As Needed (congestion). 3/21/22   Kimmy Riley MD   hydrALAZINE (APRESOLINE) 25 MG tablet 2 (Two) Times a Day. 3/25/22   Breann Shukla MD   Insulin Glargine (LANTUS SOLOSTAR) 100 UNIT/ML injection pen Inject 40 Units under the skin into the appropriate area as directed Daily. 11/16/21   Kimmy Riley MD   Insulin Lispro (ADMELOG SOLOSTAR SC) Inject  under the skin into the appropriate area as directed. Per SSI, if BS <150 = 0 units, 151-180= 1 unit, 181-210= 2units, 211-240= 3units, 241-270= 4units, 271-300= 5units, 301-330=6 untis, 331-390= 8units, <390 give 9units    Breann Shukla MD   ipratropium (ATROVENT HFA) 17 MCG/ACT inhaler Inhale 2 puffs 4 (Four) Times a Day.    Breann Shukla MD   ipratropium-albuterol (DUO-NEB) 0.5-2.5 mg/3 ml nebulizer Take 3 mL by nebulization Every 4 (Four) Hours As Needed.    Breann Shukla MD   iron polysaccharides (NIFEREX) 150 MG capsule Take 1 capsule by mouth Daily. 11/29/21   Kimmy Riley MD   levalbuterol (XOPENEX HFA) 45 MCG/ACT inhaler Inhale 1-2 puffs Every 4 (Four) Hours As Needed for Wheezing.    Breann Shukla MD   magnesium oxide (MAGOX) 400 (241.3 Mg) MG tablet tablet Take 400 mg by mouth 3 (Three) Times a Day.    Breann Shukla MD   methylPREDNISolone (Medrol) 4 MG dose pack Take as directed on package instructions. 3/21/22   Kimmy Riley MD   metoprolol tartrate (LOPRESSOR) 100 MG tablet Take 1 tablet by mouth 2 (Two) Times a Day. 3/17/22   Kimmy Riley MD   mometasone-formoterol (DULERA 200) 200-5 MCG/ACT inhaler Inhale 2 puffs 2 (Two) Times a Day.    Provider, MD Breann   NIFEdipine XL (Procardia XL) 30 MG 24 hr tablet Take 1 tablet by mouth Daily. 2/28/22   Kimmy Riley MD   O2 (OXYGEN) 2 Liter O2 - CONTINUOUS (route:  Oxygen) 22   Breann Shukla MD   oxyCODONE (OXY-IR) 5 MG capsule Take 5 mg by mouth Every 4 (Four) Hours As Needed for Moderate Pain .    Breann Shukla MD   potassium chloride 10 MEQ CR tablet Take 1 po 3 days a week with lasix. 22   Kimmy Riley MD   tamsulosin (FLOMAX) 0.4 MG capsule 24 hr capsule Take 1 capsule by mouth Every Evening. 22   Kimmy Riley MD   tobramycin PF (MOIZ) 300 MG/5ML nebulizer solution Take 5 mL by nebulization 2 (two) times a day. 10/25/21   Severiano Carolina MD   traZODone (DESYREL) 100 MG tablet Take 1 tablet by mouth Every Night. 3/17/22   Kimmy Riley MD   TRUEplus Lancets 28G misc USE AS DIRECTED 21   Kimmy Riley MD   Calcitrate 950 (200 Ca) MG tablet TAKE 1 TABLET BY MOUTH EVERY DAY 21  Kimmy Riley MD   famotidine (PEPCID) 40 MG tablet Take 1 tablet by mouth 2 (Two) Times a Day. 3/21/22 4/5/22  Kimmy Riley MD   furosemide (Lasix) 40 MG tablet 1 po 3 days a week, hold if BP < 110/60. Take one potassium with every lasix 3/17/22 4/5/22  Kimmy Riley MD   hydrALAZINE (APRESOLINE) 25 MG tablet Take 2 tablets by mouth 2 (Two) Times a Day With Meals. 3/25/22 4/5/22  Kimmy Riley MD        Social History:   Social History     Tobacco Use   • Smoking status: Former Smoker     Packs/day: 1.00     Years: 6.00     Pack years: 6.00     Types: Cigarettes     Quit date:      Years since quittin.2   • Smokeless tobacco: Never Used   Vaping Use   • Vaping Use: Never used   Substance Use Topics   • Alcohol use: Not Currently   • Drug use: Never       Review of Systems:  Review of Systems   Constitutional: Negative for chills and fever.   HENT: Positive for congestion. Negative for ear pain and sore throat.    Eyes: Negative for pain.   Respiratory: Positive for cough and shortness of breath. Negative for chest tightness.    Cardiovascular: Negative for chest pain.  "  Gastrointestinal: Negative for abdominal pain, diarrhea, nausea and vomiting.   Genitourinary: Negative for flank pain and hematuria.   Musculoskeletal: Negative for joint swelling.   Skin: Negative for pallor.   Neurological: Negative for seizures and headaches.   All other systems reviewed and are negative.         Physical Exam:  /62 (BP Location: Left arm, Patient Position: Sitting)   Pulse (!) 45   Temp 97.9 °F (36.6 °C) (Oral)   Resp 23   Ht 175.3 cm (69\")   Wt 125 kg (276 lb 3.8 oz)   SpO2 (!) 88%   BMI 40.79 kg/m²     Physical Exam Vital signs were reviewed under triage note.  General appearance - Patient appears well-developed and well-nourished.  Patient is in no acute distress.  Head - Normocephalic, atraumatic.  Pupils - Equal, round, reactive to light.  Extraocular muscles are intact.  Conjunctive is clear.  Nasal - Normal inspection.  No evidence of trauma or epistaxis.  Tympanic membranes - Gray, intact without erythema or retractions.  Oral mucosa - Pink and moist without lesions or erythema.  Uvula is midline.  Chest wall - Atraumatic.  Chest wall is nontender.  There is no vesicular rashes noted.  Neck - Supple.  Trachea was midline.  There is no palpable lymphadenopathy or thyromegaly.  There are no meningeal signs  Lungs - Clear to auscultation and percussion bilaterally.  There is mild scattered expiratory wheezes.  Heart - Regular rate and rhythm without any murmurs, clicks, or gallops.  Abdomen - Soft.  Bowel sounds are present.  There is no palpable tenderness.  There is no rebound, guarding, or rigidity.  There are no palpable masses.  There are no pulsatile masses.  Back - Spine is straight and midline.  There is no CVA tenderness.  Extremities - Intact x4 with full range of motion.  There is no palpable edema.  Pulses are intact x4 and equal.  Neurologic - Patient is awake, alert, and oriented x3.  Cranial nerves II through XII are grossly intact.  Motor and sensory " functions grossly intact.  Cerebellar function was normal.  Integument - There are no rashes.  There are no petechia or purpura lesions noted.  There are no vesicular lesions noted.            Medications in the Emergency Department:  Medications   sodium chloride 0.9 % flush 10 mL (has no administration in time range)   cefTRIAXone (ROCEPHIN) IVPB 1 g (1 g Intravenous New Bag 4/5/22 1933)   AZITHROMYCIN 500 MG/250 ML 0.9% NS IVPB (vial-mate) (has no administration in time range)   aspirin chewable tablet 324 mg (324 mg Oral Given 4/5/22 1654)   methylPREDNISolone sodium succinate (SOLU-Medrol) injection 125 mg (125 mg Intravenous Given 4/5/22 1653)   ipratropium-albuterol (DUO-NEB) nebulizer solution 3 mL (3 mL Nebulization Given 4/5/22 1657)   furosemide (LASIX) injection 60 mg (60 mg Intravenous Given 4/5/22 1653)        Labs  Lab Results (last 24 hours)     Procedure Component Value Units Date/Time    POCT Glucose [[id number]]  (Abnormal) Collected: 04/05/22 1357    Specimen: Blood Updated: 04/05/22 1357     Glucose 217 mg/dL     CBC & Differential [[id number]]  (Abnormal) Collected: 04/05/22 1529    Specimen: Blood Updated: 04/05/22 1536    Narrative:      The following orders were created for panel order CBC & Differential.  Procedure                               Abnormality         Status                     ---------                               -----------         ------                     CBC Auto Differential[[id number]]        Abnormal            Final result                 Please view results for these tests on the individual orders.    Comprehensive Metabolic Panel [[id number]]  (Abnormal) Collected: 04/05/22 1529    Specimen: Blood Updated: 04/05/22 1555     Glucose 231 mg/dL      BUN 40 mg/dL      Creatinine 1.82 mg/dL      Sodium 135 mmol/L      Potassium 4.4 mmol/L      Chloride 95 mmol/L      CO2 29.8 mmol/L      Calcium 9.4 mg/dL      Total Protein 8.0 g/dL      Albumin 3.70 g/dL      ALT (SGPT)  14 U/L      AST (SGOT) 14 U/L      Alkaline Phosphatase 181 U/L      Total Bilirubin 0.4 mg/dL      Globulin 4.3 gm/dL      A/G Ratio 0.9 g/dL      BUN/Creatinine Ratio 22.0     Anion Gap 10.2 mmol/L      eGFR 43.1 mL/min/1.73      Comment: National Kidney Foundation and American Society of Nephrology (ASN) Task Force recommended calculation based on the Chronic Kidney Disease Epidemiology Collaboration (CKD-EPI) equation refit without adjustment for race.       Narrative:      GFR Normal >60  Chronic Kidney Disease <60  Kidney Failure <15      BNP [[id number]]  (Abnormal) Collected: 04/05/22 1529    Specimen: Blood Updated: 04/05/22 1553     proBNP 1,966.0 pg/mL     Narrative:      Among patients with dyspnea, NT-proBNP is highly sensitive for the detection of acute congestive heart failure. In addition NT-proBNP of <300 pg/ml effectively rules out acute congestive heart failure with 99% negative predictive value.    Results may be falsely decreased if patient taking Biotin.      Troponin [[id number]]  (Abnormal) Collected: 04/05/22 1529    Specimen: Blood Updated: 04/05/22 1610     Troponin T 0.134 ng/mL     Narrative:      Troponin T Reference Range:  <= 0.03 ng/mL-   Negative for AMI  >0.03 ng/mL-     Abnormal for myocardial necrosis.  Clinicians would have to utilize clinical acumen, EKG, Troponin and serial changes to determine if it is an Acute Myocardial Infarction or myocardial injury due to an underlying chronic condition.       Results may be falsely decreased if patient taking Biotin.      CBC Auto Differential [[id number]]  (Abnormal) Collected: 04/05/22 1529    Specimen: Blood Updated: 04/05/22 1536     WBC 14.56 10*3/mm3      RBC 3.57 10*6/mm3      Hemoglobin 10.0 g/dL      Hematocrit 32.7 %      MCV 91.6 fL      MCH 28.0 pg      MCHC 30.6 g/dL      RDW 12.7 %      RDW-SD 42.3 fl      MPV 8.8 fL      Platelets 340 10*3/mm3      Neutrophil % 80.7 %      Lymphocyte % 8.7 %      Monocyte % 8.1 %       Eosinophil % 0.4 %      Basophil % 0.4 %      Immature Grans % 1.7 %      Neutrophils, Absolute 11.74 10*3/mm3      Lymphocytes, Absolute 1.27 10*3/mm3      Monocytes, Absolute 1.18 10*3/mm3      Eosinophils, Absolute 0.06 10*3/mm3      Basophils, Absolute 0.06 10*3/mm3      Immature Grans, Absolute 0.25 10*3/mm3      nRBC 0.0 /100 WBC     Lactic Acid, Plasma [[id number]] Collected: 04/05/22 1933    Specimen: Blood Updated: 04/05/22 1938    Blood Culture - Blood, Arm, Left [[id number]] Collected: 04/05/22 1933    Specimen: Blood from Arm, Left Updated: 04/05/22 1939    Blood Culture - Blood, Arm, Left [[id number]] Collected: 04/05/22 1933    Specimen: Blood from Arm, Left Updated: 04/05/22 1939    COVID-19,APTIMA PANTHER(DEBRA),[facility], NP/OP SWAB IN UTM/VTM/[facility] TRANSPORT MEDIA,24 HR TAT - Swab, Nasopharynx [[id number]] Collected: 04/05/22 1938    Specimen: Swab from Nasopharynx Updated: 04/05/22 1943           Imaging:  XR Chest 1 View    Result Date: 4/5/2022  PROCEDURE: XR CHEST 1 VW  COMPARISON: [facility], CHEST PA/AP & LAT 2V, 10/13/2020, 15:42.  [facility], CR, CHEST PA/AP & LAT 2V, 12/01/2020, 11:50.  INDICATIONS: short of breath  FINDINGS:  The patient is rotated.  There is a left-sided chest port with tip terminating at the upper SVC.  There is cardiomegaly.  There is persistent right upper lobe airspace opacity when compared to the prior examinations.  There are streaky interstitial opacities throughout the left lung.  Findings are concerning for multifocal pneumonia.  There is a probable right-sided pleural effusion.  There is no pneumothorax.  There are degenerative changes of the thoracic spine.        1. Persistent right upper lobe airspace opacity representing focal infection or mass. 2. Diffuse interstitial opacities within the left lung likely representing multifocal pneumonia. 3. Probable small right-sided pleural effusion.       ORIANA WHITLEY MD        Electronically Signed and Approved By: ORIANA WHITLEY MD on 4/05/2022 at 15:45                EKG:  EKG performed at 1540 was interpreted by me to show a sinus bradycardia with a ventricular rate of 50 bpm.  The NJ interval was 214 ms which constitutes a first-degree AV block.  P waves are normal.  QRS interval was normal.  Axis is 35 degrees.  There is a nonspecific ST-T wave changes identified.  QT corrected was 404 ms.  This EKG was compared with a prior dated 1/20/2019 and is changed.    Procedures:  Procedures    Progress                      The patient was seen and evaluated the ED by me.  The above history and physical examination was performed as documented.  Patient was placed on 5 to 6 L of O2 to maintain O2 saturations at 88 to 89%.  Patient's BNP came back elevated and was given IV Lasix of 60 mg.  Patient was also treated for a COPD exacerbation with IV Solu-Medrol and a DuoNeb breathing treatment.  Once the patient's chest x-ray was obtained and reviewed patient had blood cultures and as well as lactic acid drawn.  Patient was started on Rocephin and azithromycin to treat community-acquired pneumonia.  COVID-19 swab was also ordered.  Due to the patient's respiratory failure with hypoxemia patient require hospitalization.  I did consult the hospitalist service who agreed admit the patient.  Patient was noted have an elevated troponin and will need to have this trended as is possible he may have also had a non-STEMI.  Since patient is not having chest pain no nitroglycerin was administered.      Medical Decision Making:  [facility]     Final diagnoses:   Multifocal pneumonia   Chronic obstructive pulmonary disease, unspecified COPD type ([facility])   Non-STEMI (non-ST elevated myocardial infarction) ([facility])        Disposition:  ED Disposition     ED Disposition   Decision to Admit    Condition   --    Comment   Level of Care: Telemetry [5]   Diagnosis: COPD (chronic obstructive pulmonary disease) ([facility]) [150287]    Admitting Physician: LACEY CASAS [[id number]]   Attending Physician: LACEY CASAS [[id number]]   Isolate for COVID?: No [0]   Certification: I Certify That Inpatient Hospital Services Are Medically Necessary For Greater Than 2 Midnights                Reg Ring DO  04/10/22 1132

## 2022-04-05 NOTE — PROGRESS NOTES
Preston Wallis presents to [facility].    Chief Complaint: shortness of air    Subjective       History of Present Illness:  TONNY Magaña presents today with acute SOA, onset last night with extreme fatigue, evidentally he lays in bed all the time, on presentation his O2 sats were 56% on 2 liters non continuous, and he was falling asleep on us during conversation, his wife tried to get him to go to the hospital last night and he refused.  He has h/o MRSA pneumonia, COPD with frequent recurrent exacerbations, hospitalization for respiratory failure, sleep apnea (, stage 3a kidney failure, diabetes with hgba1c 8.3%.  He is non compliant with his cpap and his inhalers.      Review of Systems:  Review of Systems   Constitutional: Positive for chills and fatigue. Negative for fever.   HENT: Negative for congestion, ear discharge and sore throat.    Respiratory: Positive for cough, shortness of breath and wheezing.    Cardiovascular: Negative for chest pain, palpitations and leg swelling.   Gastrointestinal: Negative for abdominal pain, constipation, diarrhea, nausea, vomiting and GERD.   Genitourinary: Negative for dysuria.   Skin: Negative for rash.   Neurological: Negative for dizziness and headache.        Objective   Medical History:  Past Medical History:   • Age-related cognitive decline   • Allergic contact dermatitis   • Allergies   • Anemia   • Bronchiectasis with acute lower respiratory infection (HCC)   • Chest pain   • Chronic bronchitis (HCC)   • Chronic diastolic (congestive) heart failure (HCC)   • Chronic kidney disease   • Chronic respiratory failure with hypoxia (HCC)   • COPD (chronic obstructive pulmonary disease) (HCC)   • COPD with acute exacerbation (HCC)   • Cough   • Dependence on supplemental oxygen   • Eczema   • Erectile dysfunction    due to organic reasons   • Essential (primary) hypertension   • Fracture    closed fracture of other tarsal and metatarsal bones    • GERD without esophagitis   • High risk medication use   • Hypercholesteremia   • Hypomagnesemia   • Insomnia   • Low back pain   • Major depressive disorder   • Major depressive disorder   • Morbid (severe) obesity due to excess calories (HCC)   • MRSA pneumonia (HCC)   • Muscle weakness   • 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 without evidence of necrosis (HCC)   • Obstructive sleep apnea (adult) (pediatric)   • Other forms of dyspnea   • Other long term (current) drug therapy   • Other pulmonary embolism without acute cor pulmonale (HCC)   • Other specified noninfective gastroenteritis and colitis   • Other spondylosis, lumbar region   • Pain in both knees   • Paroxysmal atrial fibrillation (HCC)   • Peripheral neuropathy    attributed to type 2 diabetes   • Pneumonia, unspecified organism   • Polyneuropathy   • Rash and other nonspecific skin eruption   • Syncope and collapse   • Tachycardia   • Type 1 diabetes mellitus with diabetic chronic kidney disease (HCC)   • Type 2 diabetes mellitus (HCC)   • Unspecified fall, initial encounter     Past Surgical History:   • CHOLECYSTECTOMY   • KNEE SURGERY   • OTHER SURGICAL HISTORY    venous port   • TONSILLECTOMY AND ADENOIDECTOMY      Family History   Problem Relation Age of Onset   • Coronary artery disease Mother    • Hypertension Mother    • Diabetes type II Mother    • Asthma Father    • Cancer Sister      Social History     Tobacco Use   • Smoking status: Former Smoker     Packs/day: 1.00     Years: 6.00     Pack years: 6.00     Types: Cigarettes     Quit date:      Years since quittin.2   • Smokeless tobacco: Never Used   Substance Use Topics   • Alcohol use: Not Currently       Health Maintenance Due   Topic Date Due   • COLORECTAL CANCER SCREENING  Never done   • ANNUAL PHYSICAL  Never done   • COVID-19 Vaccine (1) Never done   • Hepatitis B (1 of 3 - Risk 3-dose series) Never done   • ZOSTER VACCINE  (1 of 2) Never done   • DIABETIC FOOT EXAM  Never done   • DIABETIC EYE EXAM  06/09/2021        Immunization History   Administered Date(s) Administered   • Flu Vaccine Quad PF >36MO 10/18/2016, 10/16/2017, 11/04/2019   • Flu Vaccine Split Quad 11/04/2019   • Influenza Quad Vaccine (Inpatient) 09/19/2013   • Influenza, Unspecified 09/21/2020   • Pneumococcal Polysaccharide (PPSV23) 11/20/1997   • Tdap 09/18/2018       Allergies   Allergen Reactions   • Benadryl [Diphenhydramine] Itching   • Proventil [Albuterol] Other (See Comments)     Mouth sores          Medications:  No current facility-administered medications on file prior to visit.     Current Outpatient Medications on File Prior to Visit   Medication Sig   • acetaminophen (TYLENOL) 500 MG tablet Take 1,000 mg by mouth Every 8 (Eight) Hours As Needed.   • albuterol sulfate  (90 Base) MCG/ACT inhaler Inhale 2 puffs 4 (Four) Times a Day As Needed.   • apixaban (Eliquis) 5 MG tablet tablet take 2 tablets BY MOUTH EVERY DAY   • aspirin 81 MG EC tablet Take 81 mg by mouth Daily.   • atorvastatin (LIPITOR) 20 MG tablet Take 1 tablet by mouth Every Night.   • B-D UF III MINI PEN NEEDLES 31G X 5 MM misc USE AS DIRECTED WITH INSULIN   • Blood Glucose Monitoring Suppl w/Device kit 1 kit Take As Directed. Dx e11.9   • budesonide-formoterol (SYMBICORT) 160-4.5 MCG/ACT inhaler Inhale 2 puffs 2 (two) times a day.   • calcium citrate (CALCITRATE) 950 (200 Ca) MG tablet TAKE 1 TABLET BY MOUTH EVERY DAY   • Cholecalciferol (Vitamin D3) 50 MCG (2000 UT) tablet Take 1 tablet BY MOUTH EVERY MORNING FOR vitamin deficiency   • dapagliflozin (Farxiga) 5 MG tablet tablet Take 1 tablet by mouth Daily.   • Diclofenac Sodium (VOLTAREN) 1 % gel gel    • dilTIAZem CD (CARDIZEM CD) 120 MG 24 hr capsule Take 1 capsule by mouth Daily.   • docusate sodium (COLACE) 100 MG capsule TAKE 1 CAPSULE BY MOUTH TWICE DAILY   • DULoxetine (CYMBALTA) 60 MG capsule Take 1 capsule by mouth 2 (Two)  Times a Day.   • exenatide er (Bydureon BCise) 2 MG/0.85ML auto-injector injection Inject 0.85 mL under the skin into the appropriate area as directed 1 (One) Time Per Week.   • FAMOTIDINE PO Take 20 mg by mouth 2 (Two) Times a Day.   • ferrous gluconate (FERGON) 324 MG tablet Take 1 tablet by mouth Daily With Breakfast.   • furosemide (Lasix) 40 MG tablet Take 40 mg by mouth Daily.   • gabapentin (NEURONTIN) 300 MG capsule Take 1 capsule by mouth every night at bedtime.   • glucose blood test strip 1 each by Other route 4 (Four) Times a Day With Meals & at Bedtime. Use as instructed dx e 11.9   • guaifenesin-dextromethorphan (MUCINEX DM)  MG tablet sustained-release 12 hour tablet Take 1 tablet by mouth 2 (Two) Times a Day As Needed (congestion).   • hydrALAZINE (APRESOLINE) 25 MG tablet 2 (Two) Times a Day.   • Insulin Glargine (LANTUS SOLOSTAR) 100 UNIT/ML injection pen Inject 40 Units under the skin into the appropriate area as directed Daily.   • Insulin Lispro (ADMELOG SOLOSTAR SC) Inject  under the skin into the appropriate area as directed. Per SSI, if BS <150 = 0 units, 151-180= 1 unit, 181-210= 2units, 211-240= 3units, 241-270= 4units, 271-300= 5units, 301-330=6 untis, 331-390= 8units, <390 give 9units   • ipratropium (ATROVENT HFA) 17 MCG/ACT inhaler Inhale 2 puffs 4 (Four) Times a Day.   • ipratropium-albuterol (DUO-NEB) 0.5-2.5 mg/3 ml nebulizer Take 3 mL by nebulization Every 4 (Four) Hours As Needed.   • iron polysaccharides (NIFEREX) 150 MG capsule Take 1 capsule by mouth Daily.   • levalbuterol (XOPENEX HFA) 45 MCG/ACT inhaler Inhale 1-2 puffs Every 4 (Four) Hours As Needed for Wheezing.   • magnesium oxide (MAGOX) 400 (241.3 Mg) MG tablet tablet Take 400 mg by mouth 3 (Three) Times a Day.   • methylPREDNISolone (Medrol) 4 MG dose pack Take as directed on package instructions.   • metoprolol tartrate (LOPRESSOR) 100 MG tablet Take 1 tablet by mouth 2 (Two) Times a Day.   •  "mometasone-formoterol (DULERA 200) 200-5 MCG/ACT inhaler Inhale 2 puffs 2 (Two) Times a Day.   • NIFEdipine XL (Procardia XL) 30 MG 24 hr tablet Take 1 tablet by mouth Daily.   • O2 (OXYGEN) 2 Liter O2 - CONTINUOUS (route: Oxygen)   • oxyCODONE (OXY-IR) 5 MG capsule Take 5 mg by mouth Every 4 (Four) Hours As Needed for Moderate Pain .   • potassium chloride 10 MEQ CR tablet Take 1 po 3 days a week with lasix.   • tamsulosin (FLOMAX) 0.4 MG capsule 24 hr capsule Take 1 capsule by mouth Every Evening.   • tobramycin PF (MOIZ) 300 MG/5ML nebulizer solution Take 5 mL by nebulization 2 (two) times a day.   • traZODone (DESYREL) 100 MG tablet Take 1 tablet by mouth Every Night.   • TRUEplus Lancets 28G misc USE AS DIRECTED   • [DISCONTINUED] famotidine (PEPCID) 40 MG tablet Take 1 tablet by mouth 2 (Two) Times a Day.   • [DISCONTINUED] furosemide (Lasix) 40 MG tablet 1 po 3 days a week, hold if BP < 110/60. Take one potassium with every lasix   • [DISCONTINUED] hydrALAZINE (APRESOLINE) 25 MG tablet Take 2 tablets by mouth 2 (Two) Times a Day With Meals.   • [DISCONTINUED] Calcitrate 950 (200 Ca) MG tablet TAKE 1 TABLET BY MOUTH EVERY DAY       Vital Signs:   /47 (BP Location: Right arm, Patient Position: Sitting, Cuff Size: Adult)   Pulse 67   Ht 175.3 cm (69\")   SpO2 (!) 61%   BMI 39.87 kg/m²       Physical Exam:  Physical Exam  Vitals and nursing note reviewed.   Constitutional:       General: He is in acute distress.      Appearance: Normal appearance. He is ill-appearing and toxic-appearing. He is not diaphoretic.   HENT:      Head: Normocephalic and atraumatic.      Nose: No congestion or rhinorrhea.      Mouth/Throat:      Mouth: Mucous membranes are dry.      Pharynx: No oropharyngeal exudate or posterior oropharyngeal erythema.   Eyes:      Extraocular Movements: Extraocular movements intact.      Conjunctiva/sclera: Conjunctivae normal.      Pupils: Pupils are equal, round, and reactive to light. "   Cardiovascular:      Rate and Rhythm: Normal rate and regular rhythm.      Heart sounds: Normal heart sounds.   Pulmonary:      Breath sounds: Decreased air movement present. Examination of the right-upper field reveals decreased breath sounds. Examination of the left-upper field reveals decreased breath sounds. Examination of the right-middle field reveals decreased breath sounds. Examination of the left-middle field reveals decreased breath sounds. Examination of the right-lower field reveals decreased breath sounds. Examination of the left-lower field reveals decreased breath sounds. Decreased breath sounds and wheezing present. No rhonchi or rales.   Abdominal:      General: Abdomen is flat.      Palpations: Abdomen is soft.   Musculoskeletal:      Cervical back: Neck supple. No rigidity.   Lymphadenopathy:      Cervical: No cervical adenopathy.   Skin:     General: Skin is warm and dry.   Neurological:      Mental Status: He is oriented to person, place, and time. He is lethargic.      Comments: Not confused, but falls asleep mid conversation, easily arousable.    Psychiatric:         Mood and Affect: Mood normal.         Behavior: Behavior normal.         Result Review      The following data was reviewed by Kimmy Riley MD on 04/05/2022.  Lab Results   Component Value Date    WBC 14.56 (H) 04/05/2022    HGB 10.0 (L) 04/05/2022    HCT 32.7 (L) 04/05/2022    MCV 91.6 04/05/2022     04/05/2022     Lab Results   Component Value Date    GLUCOSE 231 (H) 04/05/2022    BUN 40 (H) 04/05/2022    CREATININE 1.82 (H) 04/05/2022    EGFRIFNONA 46 (L) 07/27/2021    BCR 22.0 04/05/2022    K 4.4 04/05/2022    CO2 29.8 (H) 04/05/2022    CALCIUM 9.4 04/05/2022    ALBUMIN 3.70 04/05/2022    LABIL2 0.9 (L) 09/30/2020    AST 14 04/05/2022    ALT 14 04/05/2022     Lab Results   Component Value Date    CHOL 131 03/17/2022    CHLPL 165 08/26/2020    TRIG 69 03/17/2022    HDL 65 (H) 03/17/2022    LDL 52 03/17/2022      Lab Results   Component Value Date    TSH 0.580 03/17/2020     Lab Results   Component Value Date    HGBA1C 8.30 (H) 03/17/2022     Lab Results   Component Value Date    PSA 0.725 03/17/2022                       Assessment and Plan:          Diagnoses and all orders for this visit:    1. Acute respiratory failure with hypoxia (HCC) (Primary)  Comments:  O 2 sats 56% on 2 L non continuous, increased to 89 % on 4 L cont.    2. COPD with exacerbation (HCC)    3. Essential (primary) hypertension    4. Chronic diastolic (congestive) heart failure (HCC)    5. Type 1 diabetes mellitus with stage 3a chronic kidney disease (HCC)  -     POCT Glucose      EMS was contacted and came to  patient to take him to [facility].  He was in significant acute respiratory failure with hypoxia.  His O2 sats improved to 89% on 4 L continuous oxygen.  He was still drowsy but easily aroused upon leaving the facility.  [facility] ER was contacted and I discussed his case with the Dr. Other vital signs were stable with normal blood pressure and pulse.  No fever.  He had recent hip surgery and does not get out of bed often so I discussed with the doctor possibility of PE versus acute respiratory failure due to COPD acute exacerbation.      Follow Up   No follow-ups on file.

## 2022-04-05 NOTE — H&P
[facility] HISTORY AND PHYSICAL  Date: 2022   Patient Name: Preston Wallis  : 1965  MRN: [id number]  Primary Care Physician:  Kimmy Riley MD  Date of admission: 2022    Subjective Shortness of air  Subjective     Chief Complaint: Shortness of air    HPI: Patient presents with shortness of air that started late last night.  Patient also has extreme fatigue and has been sleeping most of the day.    On arrival to the ED, patient's temperature is 97.9, pulse is 72, respiratory rate is 24, blood pressure is 109/62, he is on 4 L of oxygen and saturating 89%.  He was then switched to 6 L of oxygen and is now saturating 88%.  On chest x-ray has persistent right upper lobe airspace opacity representing focal infection or mass.  He has diffuse interstitial opacities within the left lung likely representing 1 multifocal pneumonia.  And a probable small right-sided pleural effusion.    On labs patient has a white blood cell count of 14.56, hemoglobin of 10, troponin of 0.134, proBNP of 1966, glucose is 231, creatinine is 1.82, sodium is 134, CO2 was 29.8.    Patient has a complex pulmonary history.  He has had MRSA pneumonia, COPD with frequent recurrent exacerbations, hospitalization for respiratory failure, sleep apnea, stage III kidney failure, diabetes, is noncompliant with the CPAP or his inhalers.        Personal History     Past Medical History:  Past Medical History:   Diagnosis Date   • Age-related cognitive decline    • Allergic contact dermatitis    • Allergies    • Anemia    • Bronchiectasis with acute lower respiratory infection (HCC)    • Chest pain    • Chronic bronchitis (HCC)    • Chronic diastolic (congestive) heart failure (HCC)    • Chronic kidney disease    • Chronic respiratory failure with hypoxia (HCC)    • COPD (chronic obstructive pulmonary disease) (HCC)    • COPD with acute exacerbation (HCC)    • Cough    • Dependence on supplemental oxygen    • Eczema     • Erectile dysfunction     due to organic reasons   • Essential (primary) hypertension    • Fracture     closed fracture of other tarsal and metatarsal bones   • GERD without esophagitis    • High risk medication use    • Hypercholesteremia    • Hypomagnesemia    • Insomnia    • Low back pain    • Major depressive disorder    • Major depressive disorder    • Morbid (severe) obesity due to excess calories ([facility])    • MRSA pneumonia ([facility])    • Muscle weakness    • 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 without evidence of necrosis ([facility])    • Obstructive sleep apnea (adult) (pediatric)    • Other forms of dyspnea    • Other long term (current) drug therapy    • Other pulmonary embolism without acute cor pulmonale ([facility])    • Other specified noninfective gastroenteritis and colitis    • Other spondylosis, lumbar region    • Pain in both knees    • Paroxysmal atrial fibrillation ([facility])    • Peripheral neuropathy     attributed to type 2 diabetes   • Pneumonia, unspecified organism    • Polyneuropathy    • Rash and other nonspecific skin eruption    • Syncope and collapse    • Tachycardia    • Type 1 diabetes mellitus with diabetic chronic kidney disease ([facility])    • Type 2 diabetes mellitus ([facility])    • Unspecified fall, initial encounter          Past Surgical History:  Past Surgical History:   Procedure Laterality Date   • CHOLECYSTECTOMY     • KNEE SURGERY Left    • OTHER SURGICAL HISTORY Left     venous port   • TONSILLECTOMY AND ADENOIDECTOMY         Family History:   Family History   Problem Relation Age of Onset   • Coronary artery disease Mother    • Hypertension Mother    • Diabetes type II Mother    • Asthma Father    • Cancer Sister        Social History:   Social History     Socioeconomic History   • Marital status:    Tobacco Use   • Smoking status: Former Smoker     Packs/day: 1.00     Years: 6.00     Pack years: 6.00     Types: Cigarettes     Quit date: 1993      Years since quittin.2   • Smokeless tobacco: Never Used   Vaping Use   • Vaping Use: Never used   Substance and Sexual Activity   • Alcohol use: Not Currently   • Drug use: Never   • Sexual activity: Defer         Home Medications:  Blood Glucose Monitoring Suppl, DULoxetine, Insulin Glargine, Insulin Lispro, Insulin Pen Needle, NIFEdipine XL, O2, TRUEplus Lancets 28G, Vitamin D3, acetaminophen, albuterol sulfate HFA, apixaban, aspirin, atorvastatin, calcium citrate, dapagliflozin, dilTIAZem CD, docusate sodium, exenatide er, famotidine, ferrous gluconate, furosemide, gabapentin, glucose blood, guaifenesin-dextromethorphan, hydrALAZINE, ipratropium-albuterol, magnesium oxide, metoprolol tartrate, potassium chloride, tamsulosin, and traZODone    Allergies:  Allergies   Allergen Reactions   • Benadryl [Diphenhydramine] Itching   • Proventil [Albuterol] Other (See Comments)     Mouth sores         Review of Systems   All systems were reviewed and negative except for: somnolent, sob, cough, fatigue    Objective   Objective     Vitals:   Temp:  [97.7 °F (36.5 °C)-97.9 °F (36.6 °C)] 97.7 °F (36.5 °C)  Heart Rate:  [45-72] 49  Resp:  [18-24] 22  BP: (109-126)/(47-63) 125/63  Flow (L/min):  [4-12] 12    Physical Exam    Constitutional: somnolent    Eyes: Pupils equal, sclerae anicteric, no conjunctival injection   HENT: NCAT, mucous membranes moist   Neck: Supple, no thyromegaly, no lymphadenopathy, trachea midline   Respiratory: wheeze present    Cardiovascular: RRR, no murmurs, rubs, or gallops, palpable pedal pulses bilaterally   Gastrointestinal: Positive bowel sounds, soft, nontender, nondistended   Musculoskeletal: No bilateral ankle edema, no clubbing or cyanosis to extremities   Psychiatric: Appropriate affect, cooperative   Neurologic: Oriented x 3, strength symmetric in all extremities, Cranial Nerves grossly intact to confrontation, speech clear   Skin: No rashes     Result Review    Result Review:  I have  personally reviewed the results from the time of this admission to 4/5/2022 22:17 EDT and agree with these findings:  [x]  Laboratory  []  Microbiology  [x]  Radiology  []  EKG/Telemetry   []  Cardiology/Vascular   []  Pathology  [x]  Old records  []  Other:      Assessment/Plan   Assessment / Plan   #1 COPD exacerbation-duoneb, brovana, pulmicort, Mucinex, bronchopulmonary hygiene. Pulmonary consult.   #2  Multifocal pneumonia-azithromycin and Rocephin.  Respiratory cultures ordered.  CT for better visualization.  Swallow study R/o Aspiration.   #3 CHF exacerbation-40 mg of IV Lasix twice daily.  Echo ordered.  #4 elevated troponin-will trend.   #5 chance on ckd stage 3-should improve with diuresis.   #6 Anemia-check iron panel, FOBT, continue iron supplement  #7 Hx of pulmonary embolism-on eliquis   #8 CAD-ASA, lipitor, BB  #9 Poorly controlled DM-2-ISS, basal home dose  #10 MILADY-put on bipap   #11 GERD-continue pepcid  #12 depression-continue cymbalta   #13 acute hypoxic failure with hypercapnia-bipap   #14 PAF-on eliquis, cardizem, metoprolol.    #15 BMI-40.79   #16 hyperkalemia-should come down with lasix.        Addendum: CT shows: 1. Cardiomegaly with small bilateral pleural effusions, smooth interlobular septal thickening and ground-glass opacities likely representing pulmonary edema pattern.  2. Central bronchiectasis involving both lungs with surrounding consolidation likely representing superimposed infection.  3. Enlarged right paratracheal lymph node, likely reactive to underlying pulmonary disease.        DVT prophylaxis:  Medical DVT prophylaxis orders are present.    CODE STATUS:    Level Of Support Discussed With: Patient  Code Status (Patient has no pulse and is not breathing): CPR (Attempt to Resuscitate)  Medical Interventions (Patient has pulse or is breathing): Full Support      Admission Status:  I believe this patient meetsi npatient status.    Electronically signed by Marbella Aly DO, 04/05/22,  7:35 PM EDT.

## 2022-04-06 PROBLEM — J96.21 ACUTE ON CHRONIC RESPIRATORY FAILURE WITH HYPOXIA: Status: ACTIVE | Noted: 2022-04-05

## 2022-04-06 PROBLEM — E66.9 OBESITY (BMI 30-39.9): Chronic | Status: ACTIVE | Noted: 2022-04-06

## 2022-04-06 PROBLEM — Z79.01 CHRONIC ANTICOAGULATION: Status: ACTIVE | Noted: 2022-04-06

## 2022-04-06 LAB
ALPHA1 GLOB MFR UR ELPH: 256 MG/DL (ref 90–200)
B PARAPERT DNA SPEC QL NAA+PROBE: NOT DETECTED
B PERT DNA SPEC QL NAA+PROBE: NOT DETECTED
C PNEUM DNA NPH QL NAA+NON-PROBE: NOT DETECTED
CRP SERPL-MCNC: 16.36 MG/DL (ref 0–0.5)
D DIMER PPP FEU-MCNC: 1.54 MG/L (FEU) (ref 0–0.59)
FLUAV SUBTYP SPEC NAA+PROBE: NOT DETECTED
FLUBV RNA ISLT QL NAA+PROBE: NOT DETECTED
GLUCOSE BLDC GLUCOMTR-MCNC: 264 MG/DL (ref 70–99)
GLUCOSE BLDC GLUCOMTR-MCNC: 335 MG/DL (ref 70–99)
GLUCOSE BLDC GLUCOMTR-MCNC: 383 MG/DL (ref 70–99)
HADV DNA SPEC NAA+PROBE: NOT DETECTED
HCOV 229E RNA SPEC QL NAA+PROBE: NOT DETECTED
HCOV HKU1 RNA SPEC QL NAA+PROBE: NOT DETECTED
HCOV NL63 RNA SPEC QL NAA+PROBE: NOT DETECTED
HCOV OC43 RNA SPEC QL NAA+PROBE: NOT DETECTED
HMPV RNA NPH QL NAA+NON-PROBE: NOT DETECTED
HPIV1 RNA ISLT QL NAA+PROBE: NOT DETECTED
HPIV2 RNA SPEC QL NAA+PROBE: NOT DETECTED
HPIV3 RNA NPH QL NAA+PROBE: NOT DETECTED
HPIV4 P GENE NPH QL NAA+PROBE: NOT DETECTED
L PNEUMO1 AG UR QL IA: NEGATIVE
M PNEUMO IGG SER IA-ACNC: NOT DETECTED
M PNEUMO IGM SER QL: NEGATIVE
MRSA DNA SPEC QL NAA+PROBE: ABNORMAL
PROCALCITONIN SERPL-MCNC: 0.29 NG/ML (ref 0–0.25)
RHINOVIRUS RNA SPEC NAA+PROBE: NOT DETECTED
RSV RNA NPH QL NAA+NON-PROBE: NOT DETECTED
S PNEUM AG SPEC QL LA: NEGATIVE
SARS-COV-2 RNA PNL SPEC NAA+PROBE: DETECTED
SARS-COV-2 RNA PNL SPEC NAA+PROBE: NOT DETECTED
TROPONIN T SERPL-MCNC: 0.13 NG/ML (ref 0–0.03)
TROPONIN T SERPL-MCNC: 0.14 NG/ML (ref 0–0.03)

## 2022-04-06 PROCEDURE — 94799 UNLISTED PULMONARY SVC/PX: CPT

## 2022-04-06 PROCEDURE — 97161 PT EVAL LOW COMPLEX 20 MIN: CPT

## 2022-04-06 PROCEDURE — 92610 EVALUATE SWALLOWING FUNCTION: CPT

## 2022-04-06 PROCEDURE — 99233 SBSQ HOSP IP/OBS HIGH 50: CPT | Performed by: INTERNAL MEDICINE

## 2022-04-06 PROCEDURE — 25010000002 CEFEPIME PER 500 MG: Performed by: NURSE PRACTITIONER

## 2022-04-06 PROCEDURE — 82103 ALPHA-1-ANTITRYPSIN TOTAL: CPT | Performed by: NURSE PRACTITIONER

## 2022-04-06 PROCEDURE — 86140 C-REACTIVE PROTEIN: CPT | Performed by: NURSE PRACTITIONER

## 2022-04-06 PROCEDURE — 97110 THERAPEUTIC EXERCISES: CPT

## 2022-04-06 PROCEDURE — 0202U NFCT DS 22 TRGT SARS-COV-2: CPT | Performed by: NURSE PRACTITIONER

## 2022-04-06 PROCEDURE — 97165 OT EVAL LOW COMPLEX 30 MIN: CPT

## 2022-04-06 PROCEDURE — 84484 ASSAY OF TROPONIN QUANT: CPT | Performed by: HOSPITALIST

## 2022-04-06 PROCEDURE — 82962 GLUCOSE BLOOD TEST: CPT

## 2022-04-06 PROCEDURE — 97166 OT EVAL MOD COMPLEX 45 MIN: CPT

## 2022-04-06 PROCEDURE — 25010000002 FUROSEMIDE PER 20 MG: Performed by: HOSPITALIST

## 2022-04-06 PROCEDURE — 94640 AIRWAY INHALATION TREATMENT: CPT

## 2022-04-06 PROCEDURE — 63710000001 INSULIN LISPRO (HUMAN) PER 5 UNITS: Performed by: INTERNAL MEDICINE

## 2022-04-06 PROCEDURE — 87899 AGENT NOS ASSAY W/OPTIC: CPT | Performed by: HOSPITALIST

## 2022-04-06 PROCEDURE — 87641 MR-STAPH DNA AMP PROBE: CPT | Performed by: NURSE PRACTITIONER

## 2022-04-06 PROCEDURE — 82104 ALPHA-1-ANTITRYPSIN PHENO: CPT | Performed by: NURSE PRACTITIONER

## 2022-04-06 PROCEDURE — 94664 DEMO&/EVAL PT USE INHALER: CPT

## 2022-04-06 PROCEDURE — 99252 IP/OBS CONSLTJ NEW/EST SF 35: CPT | Performed by: INTERNAL MEDICINE

## 2022-04-06 PROCEDURE — 63710000001 INSULIN DETEMIR PER 5 UNITS: Performed by: HOSPITALIST

## 2022-04-06 PROCEDURE — 84145 PROCALCITONIN (PCT): CPT | Performed by: NURSE PRACTITIONER

## 2022-04-06 PROCEDURE — 25010000002 VANCOMYCIN 5 G RECONSTITUTED SOLUTION: Performed by: NURSE PRACTITIONER

## 2022-04-06 PROCEDURE — 85379 FIBRIN DEGRADATION QUANT: CPT | Performed by: NURSE PRACTITIONER

## 2022-04-06 PROCEDURE — 63710000001 INSULIN LISPRO (HUMAN) PER 5 UNITS: Performed by: HOSPITALIST

## 2022-04-06 PROCEDURE — 25010000002 METHYLPREDNISOLONE PER 40 MG: Performed by: HOSPITALIST

## 2022-04-06 RX ORDER — CEFEPIME 1 G/50ML
2 INJECTION, SOLUTION INTRAVENOUS EVERY 12 HOURS
Status: DISCONTINUED | OUTPATIENT
Start: 2022-04-06 | End: 2022-04-11 | Stop reason: HOSPADM

## 2022-04-06 RX ORDER — TOBRAMYCIN INHALATION SOLUTION 300 MG/5ML
300 INHALANT RESPIRATORY (INHALATION)
Status: DISCONTINUED | OUTPATIENT
Start: 2022-04-06 | End: 2022-04-11 | Stop reason: HOSPADM

## 2022-04-06 RX ORDER — CEFEPIME 1 G/50ML
2 INJECTION, SOLUTION INTRAVENOUS ONCE
Status: COMPLETED | OUTPATIENT
Start: 2022-04-06 | End: 2022-04-06

## 2022-04-06 RX ADMIN — IPRATROPIUM BROMIDE AND ALBUTEROL SULFATE 3 ML: 2.5; .5 SOLUTION RESPIRATORY (INHALATION) at 12:00

## 2022-04-06 RX ADMIN — GUAIFENESIN 1200 MG: 600 TABLET ORAL at 21:58

## 2022-04-06 RX ADMIN — METHYLPREDNISOLONE SODIUM SUCCINATE 40 MG: 40 INJECTION, POWDER, FOR SOLUTION INTRAMUSCULAR; INTRAVENOUS at 17:50

## 2022-04-06 RX ADMIN — DULOXETINE HYDROCHLORIDE 60 MG: 30 CAPSULE, DELAYED RELEASE ORAL at 21:58

## 2022-04-06 RX ADMIN — FAMOTIDINE 20 MG: 20 TABLET ORAL at 08:29

## 2022-04-06 RX ADMIN — HYDRALAZINE HYDROCHLORIDE 25 MG: 25 TABLET, FILM COATED ORAL at 21:59

## 2022-04-06 RX ADMIN — IPRATROPIUM BROMIDE AND ALBUTEROL SULFATE 3 ML: 2.5; .5 SOLUTION RESPIRATORY (INHALATION) at 06:28

## 2022-04-06 RX ADMIN — INSULIN DETEMIR 40 UNITS: 100 INJECTION, SOLUTION SUBCUTANEOUS at 21:58

## 2022-04-06 RX ADMIN — FERROUS SULFATE TAB 325 MG (65 MG ELEMENTAL FE) 325 MG: 325 (65 FE) TAB at 08:29

## 2022-04-06 RX ADMIN — IPRATROPIUM BROMIDE AND ALBUTEROL SULFATE 3 ML: 2.5; .5 SOLUTION RESPIRATORY (INHALATION) at 03:20

## 2022-04-06 RX ADMIN — IPRATROPIUM BROMIDE AND ALBUTEROL SULFATE 3 ML: 2.5; .5 SOLUTION RESPIRATORY (INHALATION) at 19:56

## 2022-04-06 RX ADMIN — ATORVASTATIN CALCIUM 20 MG: 20 TABLET, FILM COATED ORAL at 22:08

## 2022-04-06 RX ADMIN — GUAIFENESIN 1200 MG: 600 TABLET ORAL at 08:30

## 2022-04-06 RX ADMIN — Medication 10 ML: at 21:57

## 2022-04-06 RX ADMIN — DULOXETINE HYDROCHLORIDE 60 MG: 30 CAPSULE, DELAYED RELEASE ORAL at 08:29

## 2022-04-06 RX ADMIN — INSULIN LISPRO 6 UNITS: 100 INJECTION, SOLUTION INTRAVENOUS; SUBCUTANEOUS at 17:50

## 2022-04-06 RX ADMIN — TAMSULOSIN HYDROCHLORIDE 0.4 MG: 0.4 CAPSULE ORAL at 17:50

## 2022-04-06 RX ADMIN — FUROSEMIDE 40 MG: 10 INJECTION, SOLUTION INTRAMUSCULAR; INTRAVENOUS at 08:28

## 2022-04-06 RX ADMIN — INSULIN LISPRO 7 UNITS: 100 INJECTION, SOLUTION INTRAVENOUS; SUBCUTANEOUS at 08:28

## 2022-04-06 RX ADMIN — DILTIAZEM HYDROCHLORIDE 120 MG: 120 CAPSULE, COATED, EXTENDED RELEASE ORAL at 08:30

## 2022-04-06 RX ADMIN — METOPROLOL TARTRATE 100 MG: 50 TABLET, FILM COATED ORAL at 08:29

## 2022-04-06 RX ADMIN — CEFEPIME 2 G: 1 INJECTION, SOLUTION INTRAVENOUS at 13:00

## 2022-04-06 RX ADMIN — METHYLPREDNISOLONE SODIUM SUCCINATE 40 MG: 40 INJECTION, POWDER, FOR SOLUTION INTRAMUSCULAR; INTRAVENOUS at 05:51

## 2022-04-06 RX ADMIN — APIXABAN 5 MG: 5 TABLET, FILM COATED ORAL at 21:58

## 2022-04-06 RX ADMIN — HYDRALAZINE HYDROCHLORIDE 25 MG: 25 TABLET, FILM COATED ORAL at 08:30

## 2022-04-06 RX ADMIN — ASPIRIN 81 MG: 81 TABLET, COATED ORAL at 08:29

## 2022-04-06 RX ADMIN — BUDESONIDE 0.5 MG: 0.5 SUSPENSION RESPIRATORY (INHALATION) at 06:28

## 2022-04-06 RX ADMIN — METOPROLOL TARTRATE 100 MG: 50 TABLET, FILM COATED ORAL at 21:58

## 2022-04-06 RX ADMIN — INSULIN LISPRO 10 UNITS: 100 INJECTION, SOLUTION INTRAVENOUS; SUBCUTANEOUS at 17:50

## 2022-04-06 RX ADMIN — FAMOTIDINE 20 MG: 20 TABLET ORAL at 17:50

## 2022-04-06 RX ADMIN — TOBRAMYCIN 300 MG: 300 SOLUTION RESPIRATORY (INHALATION) at 12:00

## 2022-04-06 RX ADMIN — Medication 1500 MG: at 13:42

## 2022-04-06 RX ADMIN — INSULIN LISPRO 8 UNITS: 100 INJECTION, SOLUTION INTRAVENOUS; SUBCUTANEOUS at 13:00

## 2022-04-06 RX ADMIN — FUROSEMIDE 40 MG: 10 INJECTION, SOLUTION INTRAMUSCULAR; INTRAVENOUS at 17:50

## 2022-04-06 RX ADMIN — ARFORMOTEROL TARTRATE 15 MCG: 15 SOLUTION RESPIRATORY (INHALATION) at 06:28

## 2022-04-06 RX ADMIN — INSULIN LISPRO 10 UNITS: 100 INJECTION, SOLUTION INTRAVENOUS; SUBCUTANEOUS at 13:00

## 2022-04-06 RX ADMIN — TRAZODONE HYDROCHLORIDE 100 MG: 100 TABLET ORAL at 21:58

## 2022-04-06 RX ADMIN — ARFORMOTEROL TARTRATE 15 MCG: 15 SOLUTION RESPIRATORY (INHALATION) at 19:56

## 2022-04-06 RX ADMIN — APIXABAN 5 MG: 5 TABLET, FILM COATED ORAL at 08:29

## 2022-04-06 RX ADMIN — BUDESONIDE 0.5 MG: 0.5 SUSPENSION RESPIRATORY (INHALATION) at 19:56

## 2022-04-06 RX ADMIN — GABAPENTIN 300 MG: 300 CAPSULE ORAL at 21:58

## 2022-04-06 RX ADMIN — CEFEPIME 2 G: 1 INJECTION, SOLUTION INTRAVENOUS at 21:59

## 2022-04-06 NOTE — PROGRESS NOTES
"PHARMACY TO DOSE VANCOMYCIN DAY: 1  DURATION OF THERAPY: 4-13-22    INDICATION: PNEUMONIA  GOAL AUC: 400-600 MG/L.HR    55 YO male who presented with shortness of air that started late night PTA.  Patient also had extreme fatigue and had been sleeping most of the day. On arrival to the ED, patient's temperature was 97.9, pulse 72, respiratory rate 24, blood pressure 109/62, he was on 4 L of oxygen and saturating 89%.  He was then switched to 6 L of oxygen and then saturated 88%.  On chest x-ray had persistent right upper lobe airspace opacity representing focal infection or mass.  He has diffuse interstitial opacities within the left lung likely representing 1 multifocal pneumonia.  And a probable small right-sided pleural effusion. On labs patient had a white blood cell count of 14.56, hemoglobin of 10, troponin of 0.134, proBNP of 1966, glucose 231, creatinine 1.82, sodium 134, CO2 29.8. Patient has a complex pulmonary history.  He has had MRSA pneumonia, COPD with frequent recurrent exacerbations, hospitalization for respiratory failure, sleep apnea, stage III kidney failure, diabetes, is noncompliant with the CPAP or his inhalers.    HT: 175.3 cm (69\")      04/05/22 2151      Weight: 123 kg (270 lb 8.1 oz)        Estimated Creatinine Clearance: 52.1 mL/min (A) (by C-G formula based on SCr of 2.05 mg/dL (H)).  HD/CRRT/PD? NO  CONTRAST ADMINISTERED? NO  I/O last 3 completed shifts:  In: -   Out: 400 [Urine:400]    WBC: 13.49  TMAX: AF    Microbiology Results (last 10 days)       Procedure Component Value - Date/Time    Mycoplasma Pneumoniae Antibody, IgM - Blood, [[id number]]  (Normal) Collected: 04/05/22 2230    Lab Status: Final result Specimen: Blood Updated: 04/06/22 1109     Mycoplasma pneumo IgM Negative    COVID-19,APTIMA PANTHER(DEBRA),[facility], NP/OP SWAB IN UTM/VTM/SALINE TRANSPORT MEDIA,24 HR TAT - Swab, Nasopharynx [[id number]]  (Normal) Collected: 04/05/22 1938    Lab Status: Final result Specimen: " Swab from Nasopharynx Updated: 04/06/22 0552     COVID19 Not Detected    Narrative:      Fact sheet for providers: https://www.fda.gov/media/279772/download     Fact sheet for patients: https://www.fda.gov/media/178222/download    Test performed by RT PCR.          MRSA PCR ORDERED    04/05/22 2032  CT Chest    Cardiomegaly with small bilateral pleural effusions, smooth interlobular septal thickening and ground-glass opacities likely representing pulmonary edema pattern. Central bronchiectasis involving both lungs with surrounding consolidation likely representing superimposed infection. Enlarged right paratracheal lymph node, likely reactive to underlying pulmonary disease.     04/05/22 1549  XR Chest    Persistent right upper lobe airspace opacity representing focal infection or mass. Diffuse interstitial opacities within the left lung likely representing multifocal pneumonia. Probable small right-sided pleural effusion.    OTHER ANTIMICROBIAL THERAPY: Cefepime, tobramycin nebs    ASSESSMENT / PLAN:  Per [facility], a loading dose of 1500 mg followed by 1000 mg IV every 24 hours should provide:    AUC24,ss: 484 mg/L.hr  PAUC*: 66 %  Ctrough,ss: 13.8 mg/L  Pconc*: 28 %  Tox.: 9 %    Will order this and check a random level in a.m.  Labs ordered: BMP ordered for a.m.

## 2022-04-06 NOTE — PLAN OF CARE
Goal Outcome Evaluation:      Patient is COVID positive after being swabbed for respiratory panel with COVID. Patient is also positive for MRSA of the nares. Vitals have been within patient's normal limits. Urine sample was collected. Patient is on 7L high flow NC and has been in the chair for the majority of the shift. Blood sugars have been monitored and sliding scale insuline administered per MAR. Will continue to monitor.

## 2022-04-06 NOTE — PLAN OF CARE
Goal Outcome Evaluation:  Plan of Care Reviewed With: (P) patient           Outcome Evaluation: (P) Pt has decreased exercise tolerance at this time and is below reported baseline. Pt will benefit from skilled physical therapy to address their LE strength, balance, and activity tolerance to improve upon their functional mobility, gait, and transfers. Upon d/c, pt would benefit from continued physical therapy services w/ home health to continue improving on their functional mobility and activity tolerance.

## 2022-04-06 NOTE — PLAN OF CARE
Goal Outcome Evaluation:  Plan of Care Reviewed With: patient        Progress: no change  Outcome Evaluation: Patient presents with limitations in self-care, functional transfers, balance, and endurance. He would benefit from continued skilled occupational therapy services to maximize ADL performance and return home safely and independently.
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
## 2022-04-06 NOTE — THERAPY EVALUATION
Acute Care - Speech Language Pathology   Swallow Initial Evaluation  [place]     Patient Name: Preston Wallis  : 1965  MRN: [id number]  Today's Date: 2022               Admit Date: 2022    Visit Dx:     ICD-10-CM ICD-9-CM   1. Multifocal pneumonia  J18.9 486   2. Chronic obstructive pulmonary disease, unspecified COPD type ([facility])  J44.9 496   3. Non-STEMI (non-ST elevated myocardial infarction) ([facility])  I21.4 410.70   4. Oropharyngeal dysphagia  R13.12 787.22     Patient Active Problem List   Diagnosis   • COPD with exacerbation ([facility])   • Cough   • COVID-19   • Type 1 diabetes mellitus with diabetic chronic kidney disease ([facility])   • Polyneuropathy   • Pneumonia, unspecified organism   • Peripheral neuropathy   • Polyneuropathy   • Paroxysmal atrial fibrillation ([facility])   • Obstructive sleep apnea (adult) (pediatric)   • MRSA pneumonia ([facility])   • Low back pain   • Insomnia   • Essential (primary) hypertension   • Chronic kidney disease   • Chronic diastolic (congestive) heart failure ([facility])   • Anemia   • Allergies   • Acute on chronic respiratory failure with hypoxia ([facility])   • COPD (chronic obstructive pulmonary disease) ([facility])     Past Medical History:   Diagnosis Date   • Age-related cognitive decline    • Allergic contact dermatitis    • Allergies    • Anemia    • Bronchiectasis with acute lower respiratory infection ([facility])    • Chest pain    • Chronic bronchitis ([facility])    • Chronic diastolic (congestive) heart failure ([facility])    • Chronic kidney disease    • Chronic respiratory failure with hypoxia ([facility])    • COPD (chronic obstructive pulmonary disease) ([facility])    • COPD with acute exacerbation ([facility])    • Cough    • Dependence on supplemental oxygen    • Eczema    • Erectile dysfunction     due to organic reasons   • Essential (primary) hypertension    • Fracture     closed fracture of other tarsal and metatarsal bones   • GERD without esophagitis    • High risk medication use    • Hypercholesteremia    •  Hypomagnesemia    • Insomnia    • Low back pain    • Major depressive disorder    • Major depressive disorder    • Morbid (severe) obesity due to excess calories (HCC)    • MRSA pneumonia (HCC)    • Muscle weakness    • 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 without evidence of necrosis (HCC)    • Obstructive sleep apnea (adult) (pediatric)    • Other forms of dyspnea    • Other long term (current) drug therapy    • Other pulmonary embolism without acute cor pulmonale (HCC)    • Other specified noninfective gastroenteritis and colitis    • Other spondylosis, lumbar region    • Pain in both knees    • Paroxysmal atrial fibrillation (HCC)    • Peripheral neuropathy     attributed to type 2 diabetes   • Pneumonia, unspecified organism    • Polyneuropathy    • Rash and other nonspecific skin eruption    • Syncope and collapse    • Tachycardia    • Type 1 diabetes mellitus with diabetic chronic kidney disease (HCC)    • Type 2 diabetes mellitus (HCC)    • Unspecified fall, initial encounter      Past Surgical History:   Procedure Laterality Date   • CHOLECYSTECTOMY     • KNEE SURGERY Left    • OTHER SURGICAL HISTORY Left     venous port   • TONSILLECTOMY AND ADENOIDECTOMY             Inpatient Speech Pathology Dysphagia Evaluation        PAIN SCALE: None indicated.    PRECAUTIONS/CONTRAINDICATIONS: Standard    SUSPECTED ABUSE/NEGLECT/EXPLOITATION: None indicated.    SOCIAL/PSYCHOLOGICAL NEEDS/BARRIERS: None indicated.    PAST SOCIAL HISTORY: 56-year-old male lives at home    PRIOR FUNCTION: Independent    PATIENT GOALS/EXPECTATIONS: Return home    HISTORY: 56-year-old male with the above diagnosis is referred for speech therapy evaluation to assess for swallowing.  No previous speech therapy is reported.  Patient stated no complaint of difficulty swallowing but that foods do occasionally stick with him indicating mid chest.    CURRENT DIET LEVEL: Regular    OBJECTIVE:    TEST  ADMINISTERED: Clinical dysphagia evaluation    COGNITION/SAFETY AWARENESS: Patient followed directions and answered questions without difficulty    BEHAVIORAL OBSERVATIONS: Alert and cooperative    ORAL MOTOR EXAM: Grossly within functional limits.  Patient is edentulous    VOICE QUALITY: Adequate    REFLEX EXAM: Deferred    POSTURE: Assisted sitting upright in bed    FEEDING/SWALLOWING FUNCTION: Assessed with nectar liquid, thin liquids, puréed solids, crunchy solid.    CLINICAL OBSERVATIONS: Nectar liquid by cup appeared timely with vocal quality remaining clear to cervical auscultation.  Thin liquid by cup and by straw appeared timely with vocal quality remaining clear to cervical auscultation.  Patient taking multiple sips from straw.  Purée solid with swallow completed with laryngeal elevation noted to palpation.  Crunchy solid with adequate chewing followed by swallow completed clearing the oral cavity.    DYSPHAGIA CRITERIA: Swallow appears grossly functional for nutritional needs.  No overt clinical signs or symptoms of aspiration were noted at the bedside.  Note silent aspiration cannot be ruled out at the bedside.    FUNCTIONAL ASSESSMENT INSTRUMENT: Patient currently scored a level 7 of 7 on Functional Communication Measures for swallowing indicating a 0% limitation in function.    ASSESSMENT/ PLAN OF CARE:  No direct speech therapy is recommended at this time. Recommend rereferral should patient demonstrate change in status.    RECOMMENDATIONS:   1.   DIET: Regular solids cut small with additional moisture, thin liquid.    2.  POSITION: Positioning fully upright for all p.o. intake and 30 minutes following.    3.  COMPENSATORY STRATEGIES: Alternate small bites and small sips of solids and liquids at a slow rate.      Pt/responsible party agrees with plan of care and has been informed of all alternatives, risks and benefits.                            Anticipated Discharge Disposition (SLP): home  (04/06/22 0916)                                                                   Time Calculation:    Time Calculation- SLP     Row Name 04/06/22 0916             Time Calculation- SLP    SLP Start Time 0645  -TB      SLP Stop Time 0745  -TB      SLP Time Calculation (min) 60 min  -TB      SLP Received On 04/06/22  -TB              Untimed Charges    SLP Eval/Re-eval  ST Eval Oral Pharyng Swallow - 94757  -TB      03711-DB Eval Oral Pharyng Swallow Minutes 60  -TB              Total Minutes    Untimed Charges Total Minutes 60  -TB       Total Minutes 60  -TB            User Key  (r) = Recorded By, (t) = Taken By, (c) = Cosigned By    Initials Name Provider Type    TB Jayne Padilla SLP Speech and Language Pathologist                Therapy Charges for Today     Code Description Service Date Service Provider Modifiers Qty    36402219991  ST EVAL ORAL PHARYNG SWALLOW 4 4/6/2022 Jayne Padilla SLP GN 1               IVIS Wright  4/6/2022

## 2022-04-06 NOTE — PLAN OF CARE
Goal Outcome Evaluation:  Plan of Care Reviewed With: patient         DYSPHAGIA CRITERIA: Swallow appears grossly functional for nutritional needs.  No overt clinical signs or symptoms of aspiration were noted at the bedside.  Note silent aspiration cannot be ruled out at the bedside.     FUNCTIONAL ASSESSMENT INSTRUMENT: Patient currently scored a level 7 of 7 on Functional Communication Measures for swallowing indicating a 0% limitation in function.     ASSESSMENT/ PLAN OF CARE:  No direct speech therapy is recommended at this time. Recommend rereferral should patient demonstrate change in status.     RECOMMENDATIONS:   1.   DIET: Regular solids cut small with additional moisture, thin liquid.     2.  POSITION: Positioning fully upright for all p.o. intake and 30 minutes following.     3.  COMPENSATORY STRATEGIES: Alternate small bites and small sips of solids and liquids at a slow rate.

## 2022-04-06 NOTE — THERAPY EVALUATION
Acute Care - Physical Therapy Initial Evaluation  YAIMA [place]     Patient Name: Preston Wallis  : 1965  MRN: [id number]     Admit date: 2022     Referring Physician: Leonard Multani DO     Surgery Date:* No surgery found *          Today's Date: 2022      Visit Dx:     ICD-10-CM ICD-9-CM   1. Multifocal pneumonia  J18.9 486   2. Chronic obstructive pulmonary disease, unspecified COPD type ([facility])  J44.9 496   3. Non-STEMI (non-ST elevated myocardial infarction) ([facility])  I21.4 410.70   4. Oropharyngeal dysphagia  R13.12 787.22   5. Difficulty walking  R26.2 719.7     Patient Active Problem List   Diagnosis   • COPD with exacerbation ([facility])   • Cough   • COVID-19   • Type 1 diabetes mellitus with diabetic chronic kidney disease ([facility])   • Polyneuropathy   • Multifocal pneumonia   • Peripheral neuropathy   • Polyneuropathy   • Paroxysmal atrial fibrillation ([facility])   • Obstructive sleep apnea (adult) (pediatric)   • MRSA pneumonia ([facility])   • Low back pain   • Insomnia   • Essential (primary) hypertension   • Chronic kidney disease   • Chronic diastolic (congestive) heart failure ([facility])   • Anemia   • Allergies   • Acute on chronic respiratory failure with hypoxia ([facility])   • COPD (chronic obstructive pulmonary disease) ([facility])   • Obesity (BMI 30-39.9)   • Chronic anticoagulation     Past Medical History:   Diagnosis Date   • Age-related cognitive decline    • Allergic contact dermatitis    • Allergies    • Anemia    • Bronchiectasis with acute lower respiratory infection ([facility])    • Chest pain    • Chronic bronchitis ([facility])    • Chronic diastolic (congestive) heart failure ([facility])    • Chronic kidney disease    • Chronic respiratory failure with hypoxia ([facility])    • COPD (chronic obstructive pulmonary disease) ([facility])    • COPD with acute exacerbation ([facility])    • Cough    • Dependence on supplemental oxygen    • Eczema    • Erectile dysfunction     due to organic reasons   • Essential (primary) hypertension    • Fracture      closed fracture of other tarsal and metatarsal bones   • GERD without esophagitis    • High risk medication use    • Hypercholesteremia    • Hypomagnesemia    • Insomnia    • Low back pain    • Major depressive disorder    • Major depressive disorder    • Morbid (severe) obesity due to excess calories ([facility])    • MRSA pneumonia ([facility])    • Muscle weakness    • 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 without evidence of necrosis ([facility])    • Obstructive sleep apnea (adult) (pediatric)    • Other forms of dyspnea    • Other long term (current) drug therapy    • Other pulmonary embolism without acute cor pulmonale ([facility])    • Other specified noninfective gastroenteritis and colitis    • Other spondylosis, lumbar region    • Pain in both knees    • Paroxysmal atrial fibrillation ([facility])    • Peripheral neuropathy     attributed to type 2 diabetes   • Pneumonia, unspecified organism    • Polyneuropathy    • Rash and other nonspecific skin eruption    • Syncope and collapse    • Tachycardia    • Type 1 diabetes mellitus with diabetic chronic kidney disease (HCC)    • Type 2 diabetes mellitus (HCC)    • Unspecified fall, initial encounter      Past Surgical History:   Procedure Laterality Date   • CHOLECYSTECTOMY     • KNEE SURGERY Left    • OTHER SURGICAL HISTORY Left     venous port   • TONSILLECTOMY AND ADENOIDECTOMY       PT Assessment (last 12 hours)     PT Evaluation and Treatment     Row Name 04/06/22 1113          Physical Therapy Time and Intention    Subjective Information complains of;weakness;fatigue;pain (P)   -TK     Document Type evaluation (P)   -TK     Mode of Treatment individual therapy;physical therapy (P)   -TK     Patient Effort good (P)   -TK     Symptoms Noted During/After Treatment fatigue (P)   -TK     Row Name 04/06/22 1111          General Information    Patient Profile Reviewed yes (P)   -TK     Patient Observations alert;cooperative;agree to therapy (P)    -TK     Prior Level of Function min assist:;all household mobility;gait;transfer (P)   -TK     Equipment Currently Used at Home walker, rolling;rollator;wheelchair (P)   -TK     Existing Precautions/Restrictions oxygen therapy device and L/min (P)   -TK     Barriers to Rehab previous functional deficit (P)   -TK     Comment, General Information Pt reports using 2L of oxygen at home on a normal basis. (P)   -TK     Row Name 04/06/22 1113          Previous Level of Function/Home Environm    Bed Mobility, Premorbid Functional Level independent (P)   -TK     Transfers, Premorbid Functional Level partial assistance (P)   -TK     Household Ambulation, Premorbid Functional Level partial assistance (P)   -TK     Stairs, Premorbid Functional Level not applicable (see comment) (P)   Pt has ramp to enter home  -TK     Community Ambulation, Premorbid Functional Level not applicable (see comment) (P)   Pt uses wc w/ longer distances.  -TK     Row Name 04/06/22 1113          Living Environment    Current Living Arrangements home (P)   -TK     Home Accessibility -- (P)   ramp to enter home  -TK     People in Home child(chon), dependent;spouse (P)   -TK     Primary Care Provided by self;spouse/significant other;child(chon) (P)   -TK     Row Name 04/06/22 1113          Home Use of Assistive/Adaptive Equipment    Equipment Currently Used at Home wheelchair;walker, rolling;rollator (P)   -TK     Row Name 04/06/22 1113          Pain    Pretreatment Pain Rating 5/10 (P)   -TK     Posttreatment Pain Rating 5/10 (P)   -TK     Pain Location - Side/Orientation Left (P)   -TK     Pain Location - knee (P)   -TK     Row Name 04/06/22 1113          Strength (Manual Muscle Testing)    Strength (Manual Muscle Testing) -- (P)   grossly 4/5 B LEs  -TK     Row Name 04/06/22 1113          Transfers    Transfers sit-stand transfer;stand-sit transfer (P)   -TK     Sit-Stand [place] (Transfers) minimum assist (75% patient effort);nonverbal cues  (demo/gesture);verbal cues (P)   -TK     Stand-Sit [place] (Transfers) contact guard;nonverbal cues (demo/gesture);verbal cues (P)   -TK     Row Name 04/06/22 1113          Sit-Stand Transfer    Assistive Device (Sit-Stand Transfers) walker, front-wheeled (P)   -TK     Row Name 04/06/22 1113          Stand-Sit Transfer    Assistive Device (Stand-Sit Transfers) walker, front-wheeled (P)   -TK     Row Name 04/06/22 1113          Gait/Stairs (Locomotion)    Gait/Stairs Locomotion -- (P)   pt declines ambulation at this time.  -TK     Row Name 04/06/22 1113          Safety Issues, Functional Mobility    Safety Issues Affecting Function (Mobility) at risk behavior observed (P)   -TK     Impairments Affecting Function (Mobility) balance;pain;strength (P)   -TK     Row Name 04/06/22 1113          Balance    Balance Assessment standing static balance (P)   -TK     Static Standing Balance contact guard;non-verbal cues (demo/gesture);verbal cues (P)   -TK     Position/Device Used, Standing Balance supported;walker, front-wheeled (P)   -TK     Row Name 04/06/22 1113          Motor Skills    Therapeutic Exercise hip;knee;ankle (P)   -TK     Row Name 04/06/22 1113          Hip (Therapeutic Exercise)    Hip (Therapeutic Exercise) strengthening exercise (P)   -TK     Hip Strengthening (Therapeutic Exercise) marching while seated (P)   2x20 B  -TK     Row Name 04/06/22 1113          Knee (Therapeutic Exercise)    Knee (Therapeutic Exercise) strengthening exercise (P)   -TK     Knee Strengthening (Therapeutic Exercise) LAQ (long arc quad) (P)   2x20 B  -TK     Row Name 04/06/22 1113          Ankle (Therapeutic Exercise)    Ankle (Therapeutic Exercise) strengthening exercise (P)   -TK     Ankle Strengthening (Therapeutic Exercise) dorsiflexion;plantarflexion (P)   2x20 toe rais w/ calf raise B  -TK     Row Name 04/06/22 1113          Plan of Care Review    Plan of Care Reviewed With patient (P)   -TK     Outcome Evaluation Pt  has decreased exercise tolerance at this time and is below reported baseline. Pt will benefit from skilled physical therapy to address their LE strength, balance, and activity tolerance to improve upon their functional mobility, gait, and transfers. Upon d/c, pt would benefit from continued physical therapy services w/ home health to continue improving on their functional mobility and activity tolerance. (P)   -TK     Row Name 04/06/22 1113          Vital Signs    Pre SpO2 (%) 90 (P)   -TK     O2 Delivery Pre Treatment nasal cannula (P)   6L  -TK     O2 Delivery Intra Treatment -- (P)   6L  -TK     Post SpO2 (%) 90 (P)   -TK     O2 Delivery Post Treatment nasal cannula (P)   6L  -TK     Row Name 04/06/22 1113          Positioning and Restraints    Pre-Treatment Position sitting in chair/recliner (P)   -TK     Post Treatment Position chair (P)   -TK     In Chair call light within reach;encouraged to call for assist;with other staff (P)   -TK     Row Name 04/06/22 1113          Therapy Assessment/Plan (PT)    Rehab Potential (PT) fair, will monitor progress closely (P)   -TK     Criteria for Skilled Interventions Met (PT) yes;skilled treatment is necessary (P)   -TK     Problem List (PT) problems related to;balance;mobility;strength;pain (P)   -TK     Activity Limitations Related to Problem List (PT) unable to ambulate safely;unable to transfer safely (P)   -TK     Row Name 04/06/22 1113          Therapy Plan Review/Discharge Plan (PT)    Therapy Plan Review (PT) evaluation/treatment results reviewed;patient (P)   -TK     Row Name 04/06/22 1113          Physical Therapy Goals    Bed Mobility Goal Selection (PT) bed mobility, PT goal 1 (P)   -TK     Transfer Goal Selection (PT) transfer, PT goal 1 (P)   -TK     Gait Training Goal Selection (PT) gait training, PT goal 1 (P)   -TK     Row Name 04/06/22 1113          Bed Mobility Goal 1 (PT)    Activity/Assistive Device (Bed Mobility Goal 1, PT) bed mobility activities,  all (P)   -TK     [place] Level/Cues Needed (Bed Mobility Goal 1, PT) independent (P)   -TK     Time Frame (Bed Mobility Goal 1, PT) long term goal (LTG);10 days (P)   -TK     Row Name 04/06/22 1113          Transfer Goal 1 (PT)    Activity/Assistive Device (Transfer Goal 1, PT) transfers, all (P)   -TK     [place] Level/Cues Needed (Transfer Goal 1, PT) independent (P)   -TK     Time Frame (Transfer Goal 1, PT) long term goal (LTG);10 days (P)   -TK     Row Name 04/06/22 1113          Gait Training Goal 1 (PT)    Activity/Assistive Device (Gait Training Goal 1, PT) gait (walking locomotion);assistive device use (P)   -TK     [place] Level (Gait Training Goal 1, PT) standby assist (P)   -TK     Distance (Gait Training Goal 1, PT) 50 (P)   -TK     Time Frame (Gait Training Goal 1, PT) long term goal (LTG);10 days (P)   -TK           User Key  (r) = Recorded By, (t) = Taken By, (c) = Cosigned By    Initials Name Provider Type    TK Alber Dickson, PT Student PT Student                Physical Therapy Education                 Title: PT OT SLP Therapies (Done)     Topic: Physical Therapy (Done)     Point: Mobility training (Done)     Learning Progress Summary           Patient Acceptance, E, VU by TK at 4/6/2022 1138                   Point: Home exercise program (Done)     Learning Progress Summary           Patient Acceptance, E, VU by TK at 4/6/2022 1138                   Point: Body mechanics (Done)     Learning Progress Summary           Patient Acceptance, E, VU by TK at 4/6/2022 1138                   Point: Precautions (Done)     Learning Progress Summary           Patient Acceptance, E, VU by TK at 4/6/2022 1138                               User Key     Initials Effective Dates Name Provider Type Discipline    TK 02/09/22 -  Alber Dickson, PT Student PT Student PT              PT Recommendation and Plan  Anticipated Discharge Disposition (PT): (P) home with home health, home with assist  Planned  Therapy Interventions (PT): (P) balance training, bed mobility training, gait training, home exercise program, patient/family education, strengthening, stretching, transfer training, wheelchair management/propulsion training  Therapy Frequency (PT): (P) daily  Plan of Care Reviewed With: (P) patient  Outcome Evaluation: (P) Pt has decreased exercise tolerance at this time and is below reported baseline. Pt will benefit from skilled physical therapy to address their LE strength, balance, and activity tolerance to improve upon their functional mobility, gait, and transfers. Upon d/c, pt would benefit from continued physical therapy services w/ home health to continue improving on their functional mobility and activity tolerance.   Outcome Measures     Row Name 04/06/22 1100             How much help from another person do you currently need...    Turning from your back to your side while in flat bed without using bedrails? 3 (P)   -TK      Moving from lying on back to sitting on the side of a flat bed without bedrails? 3 (P)   -TK      Moving to and from a bed to a chair (including a wheelchair)? 3 (P)   -TK      Standing up from a chair using your arms (e.g., wheelchair, bedside chair)? 3 (P)   -TK      Climbing 3-5 steps with a railing? 2 (P)   -TK      To walk in hospital room? 2 (P)   -TK      AM-PAC 6 Clicks Score (PT) 16 (P)   -TK              Functional Assessment    Outcome Measure Options AM-PAC 6 Clicks Basic Mobility (PT) (P)   -TK            User Key  (r) = Recorded By, (t) = Taken By, (c) = Cosigned By    Initials Name Provider Type    TK Alber Dickson, PT Student PT Student                 Time Calculation:    PT Charges     Row Name 04/06/22 1113             Time Calculation    PT Received On 04/06/22 (P)   -TK      PT Goal Re-Cert Due Date 04/15/22 (P)   -TK              Timed Charges    53514 - PT Therapeutic Exercise Minutes 8 (P)   -TK              Untimed Charges    PT Eval/Re-eval Minutes 35 (P)    -TK              Total Minutes    Timed Charges Total Minutes 8 (P)   -TK      Untimed Charges Total Minutes 35 (P)   -TK       Total Minutes 43 (P)   -TK            User Key  (r) = Recorded By, (t) = Taken By, (c) = Cosigned By    Initials Name Provider Type    TK Alber Dickson, PT Student PT Student              Therapy Charges for Today     Code Description Service Date Service Provider Modifiers Qty    77029055836 HC PT THER PROC EA 15 MIN 4/6/2022 Alber Dickson, PT Student GP 1    79532342811 HC PT EVAL LOW COMPLEXITY 3 4/6/2022 Alber Dickson, PT Student GP 1          PT G-Codes  Outcome Measure Options: (P) AM-PAC 6 Clicks Basic Mobility (PT)  AM-PAC 6 Clicks Score (PT): (P) 16    Alber Dickson, PT Student  4/6/2022

## 2022-04-06 NOTE — PROGRESS NOTES
[facility]   Hospitalist Progress Note  Date: 2022  Patient Name: Preston Wallis  : 1965  MRN: [id number]  Date of admission: 2022      Subjective   Subjective     Chief Complaint: follow up for shortness of breath    Summary: 55 y/o M with COPD, sleep apnea, noncompliance with CPAP/inhalers, CKD stage III, type II DM with complications who presented with worsening shortness of air.  Oxygen saturation 89% on 4 L.  CT of the chest showed central bronchiectasis with surrounding consolidation involving both lungs + pulmonary edema.  Covid negative.  On antibiotics, diuretics.  Improving.  Pulmonary.    Interval Followup: Up in chair this morning.  Weaned down to 6 L/min.  Shortness of air about the same.  Having dry cough, unable to bring up any sputum.  No fevers or chills.  No chest pain or palpitations.  No swelling in his lower extremities.  Tolerated diet without nausea vomiting abdominal pain.  Denies MSK pain.  Discussed results of imaging and treatment plan.    Review of Systems  All other systems reviewed and negative unless stated above    Objective   Objective     Vitals:   Temp:  [97.3 °F (36.3 °C)-97.9 °F (36.6 °C)] 97.3 °F (36.3 °C)  Heart Rate:  [45-72] 71  Resp:  [18-24] 22  BP: (109-129)/(47-63) 129/59  Flow (L/min):  [4-15] 15  Physical Exam    Constitutional: WNWD, awake, alert, no acute distress   Eyes: Pupils equal and reactive, no conjunctival injection   HENT: NCAT, moist mucous membranes   Neck: Supple, trachea midline   Respiratory: Diminished aeration bilaterally with bilateral ankles, nonlabored respiration   Cardiovascular: RRR, no murmurs, trace BLE    Gastrointestinal: Positive bowel sounds, soft, nontender, nondistended   Musculoskeletal: No gross deformities, no clubbing or cyanosis to extremities   Psychiatric: Appropriate affect, cooperative   Neurologic: Oriented x 3, Cranial Nerves grossly intact speech clear   Skin: Warm and dry, no rashes     Result Review     Result Review:  I have personally reviewed the results from the time of this admission to 4/6/2022 07:40 EDT and agree with these findings:  [x]  Laboratory  CBC    CBC 1/2/22 3/17/22 4/5/22 4/5/22      1529 2240   WBC 12.34 (A) 10.63 14.56 (A) 13.49 (A)   RBC 2.68 (A) 3.91 (A) 3.57 (A) 3.66 (A)   Hemoglobin 7.8 (A) 10.8 (A) 10.0 (A) 10.2 (A)   Hematocrit 24.9 (A) 35.2 (A) 32.7 (A) 33.4 (A)   MCV 92.9 90.0 91.6 91.3   MCH 29.1 27.6 28.0 27.9   MCHC 31.3 (A) 30.7 (A) 30.6 (A) 30.5 (A)   RDW 13.6 12.3 12.7 12.6   Platelets 540 (A) 397 340 346   (A) Abnormal value            BMP    BMP 7/27/21 3/17/22 4/5/22 4/5/22      1529 2240   BUN 15 17 40 (A) 43 (A)   Creatinine 1.58 (A) 1.53 (A) 1.82 (A) 2.05 (A)   Sodium 133 (A) 138 135 (A) 135 (A)   Potassium 3.9 3.5 4.4 5.4 (A)   Chloride 90 (A) 95 (A) 95 (A) 94 (A)   CO2 28.6 32.7 (A) 29.8 (A) 27.0   Calcium 9.4 9.4 9.4 9.2   (A) Abnormal value                [x]  Microbiology Covid 19 negative  [x]  Radiology  [x]  EKG/Telemetry NSR, no events  []  Cardiology/Vascular   []  Pathology  []  Old records  []  Other:    Assessment/Plan   Assessment / Plan     Assessment/Plan:  Active Hospital Problems    Diagnosis  POA   • **Acute on chronic respiratory failure with hypoxia (HCC) [J96.21]  Unknown   • Obesity (BMI 30-39.9) [E66.9]  Unknown   • Chronic anticoagulation [Z79.01]  Not Applicable   • Paroxysmal atrial fibrillation (HCC) [I48.0]  Yes   • Essential (primary) hypertension [I10]  Yes   • COPD with exacerbation (HCC) [J44.1]  Yes          Pulmonology consulted; appreciate recommendations  Weaned down to 6 L/min this morning.  Baseline is 2 L.  Continue to wean to maintain SpO2 >90%  Continue IV Solu-Medrol 40 mg every 12 hours  Brovana and Pulmicort nebs twice daily  Strep and Legionella urinary antigen pending. Continue azithromycin and Rocephin, day 2.  Continue Mucinex twice daily  Add bronchopulmonary and bronchodilator protocol    Does not appear grossly volume  overloaded on exam.  Given evidence of pulmonary edema and increased proBNP will continue diuretics with IV Lasix 40 mg twice daily. Monitor strict I's and O's.  Trend renal function and electrolytes.  TTE pending    Currently in NSR.  Continue p.o. diltiazem  mg daily and Lopressor 100 mg twice daily  Continue Eliquis    Sugars uncontrolled.  Acutely worsened by steroids.  Add mealtime Humalog 10 units 3 times a day.  Continue Levemir 40 units q. nightly with moderate dose SSI  Continue home gabapentin 300 mg q. Nightly    PT/OT  A.m. labs ordered    Discussed plan with RN, pulmonology.    DVT prophylaxis:  Medical DVT prophylaxis orders are present.    CODE STATUS:   Level Of Support Discussed With: Patient  Code Status (Patient has no pulse and is not breathing): CPR (Attempt to Resuscitate)  Medical Interventions (Patient has pulse or is breathing): Full Support      Electronically signed by Leonard Multani DO, 04/06/22, 7:40 AM EDT.

## 2022-04-06 NOTE — THERAPY EVALUATION
Patient Name: Preston Wallis  : 1965    MRN: [id number]                              Today's Date: 2022       Admit Date: 2022    Visit Dx:     ICD-10-CM ICD-9-CM   1. Multifocal pneumonia  J18.9 486   2. Chronic obstructive pulmonary disease, unspecified COPD type ([facility])  J44.9 496   3. Non-STEMI (non-ST elevated myocardial infarction) ([facility])  I21.4 410.70   4. Oropharyngeal dysphagia  R13.12 787.22   5. Difficulty walking  R26.2 719.7   6. Decreased activities of daily living (ADL)  Z78.9 V49.89     Patient Active Problem List   Diagnosis   • COPD with exacerbation ([facility])   • Cough   • COVID-19   • Type 1 diabetes mellitus with diabetic chronic kidney disease ([facility])   • Polyneuropathy   • Multifocal pneumonia   • Peripheral neuropathy   • Polyneuropathy   • Paroxysmal atrial fibrillation ([facility])   • Obstructive sleep apnea (adult) (pediatric)   • MRSA pneumonia ([facility])   • Low back pain   • Insomnia   • Essential (primary) hypertension   • Chronic kidney disease   • Chronic diastolic (congestive) heart failure ([facility])   • Anemia   • Allergies   • Acute on chronic respiratory failure with hypoxia ([facility])   • COPD (chronic obstructive pulmonary disease) ([facility])   • Obesity (BMI 30-39.9)   • Chronic anticoagulation     Past Medical History:   Diagnosis Date   • Age-related cognitive decline    • Allergic contact dermatitis    • Allergies    • Anemia    • Bronchiectasis with acute lower respiratory infection ([facility])    • Chest pain    • Chronic bronchitis ([facility])    • Chronic diastolic (congestive) heart failure ([facility])    • Chronic kidney disease    • Chronic respiratory failure with hypoxia ([facility])    • COPD (chronic obstructive pulmonary disease) ([facility])    • COPD with acute exacerbation ([facility])    • Cough    • Dependence on supplemental oxygen    • Eczema    • Erectile dysfunction     due to organic reasons   • Essential (primary) hypertension    • Fracture     closed fracture of other tarsal and metatarsal bones   • GERD  without esophagitis    • High risk medication use    • Hypercholesteremia    • Hypomagnesemia    • Insomnia    • Low back pain    • Major depressive disorder    • Major depressive disorder    • Morbid (severe) obesity due to excess calories ([facility])    • MRSA pneumonia ([facility])    • Muscle weakness    • 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 without evidence of necrosis ([facility])    • Obstructive sleep apnea (adult) (pediatric)    • Other forms of dyspnea    • Other long term (current) drug therapy    • Other pulmonary embolism without acute cor pulmonale ([facility])    • Other specified noninfective gastroenteritis and colitis    • Other spondylosis, lumbar region    • Pain in both knees    • Paroxysmal atrial fibrillation ([facility])    • Peripheral neuropathy     attributed to type 2 diabetes   • Pneumonia, unspecified organism    • Polyneuropathy    • Rash and other nonspecific skin eruption    • Syncope and collapse    • Tachycardia    • Type 1 diabetes mellitus with diabetic chronic kidney disease ([facility])    • Type 2 diabetes mellitus ([facility])    • Unspecified fall, initial encounter      Past Surgical History:   Procedure Laterality Date   • CHOLECYSTECTOMY     • KNEE SURGERY Left    • OTHER SURGICAL HISTORY Left     venous port   • TONSILLECTOMY AND ADENOIDECTOMY        General Information     Row Name 04/06/22 1340          OT Time and Intention    Document Type evaluation  -LF     Mode of Treatment individual therapy;occupational therapy  -LF     Row Name 04/06/22 4377          General Information    Patient Profile Reviewed yes  -     Prior Level of Function --  Assist with ADLs from his wife, ambulated short distances with a RW or rollator, uses a w/c for long distances, sponge bathes with set-up, uses a BSC, sits to groom, and wears 2L home O2.  -LF     Existing Precautions/Restrictions no known precautions/restrictions  -LF     Barriers to Rehab none identified  -LF     Row Name  04/06/22 1348          Occupational Profile    Reason for Services/Referral (Occupational Profile) Occupational therapy consulted due to recent decline in ADLs/functional transfers. No previous occupational therapy services for current condition.  -     Row Name 04/06/22 1348          Living Environment    People in Home spouse;child(chon), adult;child(chon), dependent  -     Row Name 04/06/22 1348          Home Main Entrance    Number of Stairs, Main Entrance two  with ramp available  -     Row Name 04/06/22 1348          Stairs Within Home, Primary    Number of Stairs, Within Home, Primary none  -     Row Name 04/06/22 1348          Cognition    Orientation Status (Cognition) oriented x 3  -     Row Name 04/06/22 1348          Safety Issues, Functional Mobility    Impairments Affecting Function (Mobility) balance;endurance/activity tolerance;strength;shortness of breath  -     Comment, Safety Issues/Impairments (Mobility) Therapeutic exercises to address endurance.  -           User Key  (r) = Recorded By, (t) = Taken By, (c) = Cosigned By    Initials Name Provider Type     Lisa Miranda OT Occupational Therapist                 Mobility/ADL's     Row Name 04/06/22 1350          Bed Mobility    Bed Mobility supine-sit  -     Supine-Sit [place] (Bed Mobility) minimum assist (75% patient effort)  -     Bed Mobility, Safety Issues decreased use of arms for pushing/pulling;decreased use of legs for bridging/pushing  -     Assistive Device (Bed Mobility) bed rails;head of bed elevated  -     Row Name 04/06/22 1350          Transfers    Transfers sit-stand transfer;stand-sit transfer;bed-chair transfer  -     Bed-Chair Orla (Transfers) minimum assist (75% patient effort)  -     Assistive Device (Bed-Chair Transfers) walker, front-wheeled  -     Sit-Stand Orla (Transfers) minimum assist (75% patient effort)  -     Stand-Sit [place]la (Transfers) minimum assist  (75% patient effort)  -     Row Name 04/06/22 1350          Sit-Stand Transfer    Assistive Device (Sit-Stand Transfers) walker, front-wheeled  -     Row Name 04/06/22 1350          Activities of Daily Living    BADL Assessment/Intervention bathing;upper body dressing;lower body dressing;grooming;feeding;toileting  -[facility] Name 04/06/22 1350          Stand-Sit Transfer    Assistive Device (Stand-Sit Transfers) walker, front-wheeled  -     Row Name 04/06/22 1350          Bathing Assessment/Intervention    [place] Level (Bathing) bathing skills;upper body;standby assist;lower body;maximum assist (25% patient effort)  -     Row Name 04/06/22 1350          Upper Body Dressing Assessment/Training    [place] Level (Upper Body Dressing) upper body dressing skills;standby assist  -[facility] Name 04/06/22 1350          Lower Body Dressing Assessment/Training    [place] Level (Lower Body Dressing) lower body dressing skills;maximum assist (25% patient effort)  -[facility] Name 04/06/22 1350          Grooming Assessment/Training    [place] Level (Grooming) grooming skills;standby assist  -[facility] Name 04/06/22 1350          Self-Feeding Assessment/Training    [place] Level (Feeding) feeding skills;set up  -[facility] Name 04/06/22 1350          Toileting Assessment/Training    [place] Level (Toileting) toileting skills;maximum assist (25% patient effort)  -           User Key  (r) = Recorded By, (t) = Taken By, (c) = Cosigned By    Initials Name Provider Type     Lisa Miranda OT Occupational Therapist               Obj/Interventions     [facility] Name 04/06/22 1352          Sensory Assessment (Somatosensory)    Sensory Assessment (Somatosensory) UE sensation intact  -[facility] Name 04/06/22 1352          Vision Assessment/Intervention    Visual Impairment/Limitations WFL  -[facility] Name 04/06/22 1352          Range of Motion Comprehensive    General Range of Motion bilateral  upper extremity ROM WFL  -LF     Row Name 04/06/22 1352          Strength Comprehensive (MMT)    Comment, General Manual Muscle Testing (MMT) Assessment 4/5 bilateral upper extremities  -LF     Row Name 04/06/22 1352          Motor Skills    Motor Skills coordination;functional endurance  -LF     Coordination WFL  -LF     Functional Endurance Poor+/Fair-  -LF     Row Name 04/06/22 [facility]2          Balance    Balance Assessment sitting dynamic balance;standing dynamic balance  -LF     Dynamic Sitting Balance contact guard  -LF     Position, Sitting Balance supported;sitting edge of bed  -LF     Dynamic Standing Balance minimal assist  -LF     Position/Device Used, Standing Balance supported;walker, front-wheeled  -LF           User Key  (r) = Recorded By, (t) = Taken By, (c) = Cosigned By    Initials Name Provider Type     Lisa Miranda OT Occupational Therapist               Goals/Plan     [facility] Name 04/06/22 [facility]          Bed Mobility Goal 1 (OT)    Activity/Assistive Device (Bed Mobility Goal 1, OT) bed mobility activities, all  -LF     [place] Level/Cues Needed (Bed Mobility Goal 1, OT) modified independence  -LF     Time Frame (Bed Mobility Goal 1, OT) long term goal (LTG);10 days  -[facility] Name 04/06/22 [facility]          Transfer Goal 1 (OT)    Activity/Assistive Device (Transfer Goal 1, OT) transfers, all;walker, rolling  -     [place] Level/Cues Needed (Transfer Goal 1, OT) modified independence  -[facility] Name 04/06/22 [facility]          Bathing Goal 1 (OT)    Activity/Device (Bathing Goal 1, OT) bathing skills, all;lower body bathing  -     [place] Level/Cues Needed (Bathing Goal 1, OT) minimum assist (75% or more patient effort)  -     Time Frame (Bathing Goal 1, OT) long term goal (LTG);10 days  -[facility] Name 04/06/22 1354          Dressing Goal 1 (OT)    Activity/Device (Dressing Goal 1, OT) dressing skills, all;lower body dressing  -     [place]/Cues Needed (Dressing Goal 1,  OT) minimum assist (75% or more patient effort)  -LF     Time Frame (Dressing Goal 1, OT) long term goal (LTG);10 days  -     Row Name 04/06/22 731          Toileting Goal 1 (OT)    Activity/Device (Toileting Goal 1, OT) toileting skills, all  -LF     [place] Level/Cues Needed (Toileting Goal 1, OT) minimum assist (75% or more patient effort)  -LF     Time Frame (Toileting Goal 1, OT) long term goal (LTG);10 days  -     Row Name 04/06/22 378          Grooming Goal 1 (OT)    Activity/Device (Grooming Goal 1, OT) grooming skills, all  -LF     [place] (Grooming Goal 1, OT) set-up required  -LF     Time Frame (Grooming Goal 1, OT) long term goal (LTG);10 days  -     Row Name 04/06/22 3566          Therapy Assessment/Plan (OT)    Planned Therapy Interventions (OT) activity tolerance training;patient/caregiver education/training;BADL retraining;functional balance retraining;occupation/activity based interventions;transfer/mobility retraining  -           User Key  (r) = Recorded By, (t) = Taken By, (c) = Cosigned By    Initials Name Provider Type     Lisa Miranda OT Occupational Therapist               Clinical Impression     Row Name 04/06/22 1302          Plan of Care Review    Plan of Care Reviewed With patient  -     Progress no change  -     Outcome Evaluation Patient presents with limitations in self-care, functional transfers, balance, and endurance. He would benefit from continued skilled occupational therapy services to maximize ADL performance and return home safely and independently.  -     Row Name 04/06/22 4802          Therapy Assessment/Plan (OT)    Patient/Family Therapy Goal Statement (OT) To maximize independence.  -     Rehab Potential (OT) good, to achieve stated therapy goals  -     Criteria for Skilled Therapeutic Interventions Met (OT) yes;meets criteria;skilled treatment is necessary  -     Therapy Frequency (OT) 5 times/wk  -     Row Name 04/06/22 6646           Therapy Plan Review/Discharge Plan (OT)    Anticipated Discharge Disposition (OT) sub acute care setting;home with home health;home with assist  -LF     Row Name 04/06/22 1353          Vital Signs    O2 Delivery Pre Treatment hi-flow  -LF     O2 Delivery Intra Treatment hi-flow  -LF     O2 Delivery Post Treatment hi-flow  -LF           User Key  (r) = Recorded By, (t) = Taken By, (c) = Cosigned By    Initials Name Provider Type    LF Lisa Miranda, OT Occupational Therapist               Outcome Measures     Row Name 04/06/22 1354          How much help from another is currently needed...    Putting on and taking off regular lower body clothing? 2  -LF     Bathing (including washing, rinsing, and drying) 2  -LF     Toileting (which includes using toilet bed pan or urinal) 2  -LF     Putting on and taking off regular upper body clothing 3  -LF     Taking care of personal grooming (such as brushing teeth) 3  -LF     Eating meals 4  -LF     AM-PAC 6 Clicks Score (OT) 16  -LF     Row Name 04/06/22 1100          How much help from another person do you currently need...    Turning from your back to your side while in flat bed without using bedrails? 3 (P)   -TK     Moving from lying on back to sitting on the side of a flat bed without bedrails? 3 (P)   -TK     Moving to and from a bed to a chair (including a wheelchair)? 3 (P)   -TK     Standing up from a chair using your arms (e.g., wheelchair, bedside chair)? 3 (P)   -TK     Climbing 3-5 steps with a railing? 2 (P)   -TK     To walk in hospital room? 2 (P)   -TK     AM-PAC 6 Clicks Score (PT) 16 (P)   -TK     Row Name 04/06/22 1354 04/06/22 1100       Functional Assessment    Outcome Measure Options AM-PAC 6 Clicks Daily Activity (OT);Optimal Instrument  -LF AM-PAC 6 Clicks Basic Mobility (PT) (P)   -TK    Row Name 04/06/22 1354          Optimal Instrument    Optimal Instrument Optimal - 3  -LF     Bending/Stooping 2  -LF     Standing 2  -LF     Reaching 1   -LF     From the list, choose the 3 activities you would most like to be able to do without any difficulty Bending/stooping;Reaching;Standing  -LF     Total Score Optimal - 3 5  -LF           User Key  (r) = Recorded By, (t) = Taken By, (c) = Cosigned By    Initials Name Provider Type     Lisa Miranda, SAMSON Occupational Therapist    Alber Naranjo, PT Student PT Student                Occupational Therapy Education                 Title: PT OT SLP Therapies (In Progress)     Topic: Occupational Therapy (In Progress)     Point: ADL training (Done)     Description:   Instruct learner(s) on proper safety adaptation and remediation techniques during self care or transfers.   Instruct in proper use of assistive devices.              Learning Progress Summary           Patient Acceptance, E,TB, VU by  at 4/6/2022 1355                   Point: Home exercise program (Not Started)     Description:   Instruct learner(s) on appropriate technique for monitoring, assisting and/or progressing therapeutic exercises/activities.              Learner Progress:  Not documented in this visit.          Point: Precautions (Done)     Description:   Instruct learner(s) on prescribed precautions during self-care and functional transfers.              Learning Progress Summary           Patient Acceptance, E,TB, VU by  at 4/6/2022 1355                   Point: Body mechanics (Done)     Description:   Instruct learner(s) on proper positioning and spine alignment during self-care, functional mobility activities and/or exercises.              Learning Progress Summary           Patient Acceptance, E,TB, VU by  at 4/6/2022 1355                               User Key     Initials Effective Dates Name Provider Type Discipline     06/16/21 -  Lisa Miranda OT Occupational Therapist OT              OT Recommendation and Plan  Planned Therapy Interventions (OT): activity tolerance training, patient/caregiver education/training, BADL  retraining, functional balance retraining, occupation/activity based interventions, transfer/mobility retraining  Therapy Frequency (OT): 5 times/wk  Plan of Care Review  Plan of Care Reviewed With: patient  Progress: no change  Outcome Evaluation: Patient presents with limitations in self-care, functional transfers, balance, and endurance. He would benefit from continued skilled occupational therapy services to maximize ADL performance and return home safely and independently.     Time Calculation:    Time Calculation- OT     Row Name 04/06/22 1356             Time Calculation- OT    OT Received On 04/06/22  -LF      OT Goal Re-Cert Due Date 04/15/22  -LF              Untimed Charges    OT Eval/Re-eval Minutes 35  -LF              Total Minutes    Untimed Charges Total Minutes 35  -LF       Total Minutes 35  -LF            User Key  (r) = Recorded By, (t) = Taken By, (c) = Cosigned By    Initials Name Provider Type    LF Lisa Miranda OT Occupational Therapist              Therapy Charges for Today     Code Description Service Date Service Provider Modifiers Qty    [id number] HC OT EVAL MOD COMPLEXITY 3 4/6/2022 Lisa Miranda OT GO 1               Lisa Miranda OT  4/6/2022

## 2022-04-06 NOTE — PLAN OF CARE
Goal Outcome Evaluation:  Plan of Care Reviewed With: patient        Progress: no change  Outcome Evaluation: Pt arrived to unit from ER via stretcher at approx 2200 4-5-22, was able to ambulate with assist from stretcher to bed but 02 sats dropped with effort to mid 80s, pt recovered quickly, reviewed unit orientation and plan of care, pt denies questions at this time and states he just got out of the hospital recently, pt refusing Cpap and reports it makes him feel like he can't breathe at all when wearing the mask, has continued to report shortness of air throughout the night with exertion, will continue to monitor closely

## 2022-04-06 NOTE — CONSULTS
RT CM consulted to arrange for chest vest, PAP supplies and outpatient pulmonary rehab for pt with hx of COPD, bronchiectasis, MRSA pneumonia, MILADY and is now COVID positive.  Pt is home oxygen dependant and on PAP therapy for MILADY.  Pt states he has been dealing with chronic, daily productive cough since his initial bronchiectasis diagnosis in  2019.  Chest CT performed 04/05 confirms central bronchiectasis involving both lungs.  Mr Wallis states he has tried deep breathing and cough techniques, flutter and aerobika/PEP therapy, and postural drainage, but is still unable to fully mobilize secretions.  Pt believes he may have had a chest vest in the past but is uncertain.  When RT CM provided photos of therapy device, pt did not recognize any. No maintenance COPD medications are noted on pt's home medication list, only rescue albuterol hfa and duoneb. Pt states he need a order for PAP replacement supplies, and would like to try nasal pillows instead of full face mask.  RT CM discussed outpatient pulmonary rehab with pt.  He states he no longer drives, but says his wife could bring him on Tues and Thurs as long as he is scheduled for the morning.  RT CM placed pulmonary rehab order for cosign.  RT CM will continue to follow.

## 2022-04-06 NOTE — PROGRESS NOTES
PTD Remdesivir Day 1    COVID+ result on 4-6-22. Pt is on supplemental O2 via [facility]iflow NC. Labs Reviewed and approved for treatment.     COVID19   Date Value Ref Range Status   04/06/2022 Detected (C) Not Detected - Ref. Range Final        Lab Results   Component Value Date    GLUCOSE 279 (H) 04/05/2022    BUN 43 (H) 04/05/2022    CREATININE 2.05 (H) 04/05/2022    EGFRIFNONA 46 (L) 07/27/2021    BCR 21.0 04/05/2022    K 5.4 (H) 04/05/2022    CO2 27.0 04/05/2022    CALCIUM 9.2 04/05/2022    ALBUMIN 3.70 04/05/2022    LABIL2 0.9 (L) 09/30/2020    AST 14 04/05/2022    ALT 14 04/05/2022         Estimated Creatinine Clearance: 52.1 mL/min (A) (by C-G formula based on SCr of 2.05 mg/dL (H)).    COVID LABS:  Results From Last 14 Days   Lab Units 04/06/22  0321 04/05/22  2240 04/05/22  2206 04/05/22  1933 04/05/22  1529   PROBNP pg/mL  --   --   --   --  1,966.0*   FERRITIN ng/mL  --   --  146.80  --   --    LACTATE mmol/L  --   --   --  1.2  --    PROCALCITONIN ng/mL 0.29*  --   --   --   --    TROPONIN T ng/mL 0.130* 0.142*  --   --  0.134*

## 2022-04-07 ENCOUNTER — APPOINTMENT (OUTPATIENT)
Dept: CARDIOLOGY | Facility: HOSPITAL | Age: 57
End: 2022-04-07

## 2022-04-07 PROBLEM — J96.01 ACUTE HYPOXEMIC RESPIRATORY FAILURE DUE TO COVID-19: Status: ACTIVE | Noted: 2021-10-19

## 2022-04-07 PROBLEM — J96.02 ACUTE RESPIRATORY FAILURE WITH HYPERCAPNIA: Status: ACTIVE | Noted: 2022-04-07

## 2022-04-07 PROBLEM — E11.65 TYPE 2 DIABETES MELLITUS WITH HYPERGLYCEMIA: Status: ACTIVE | Noted: 2022-04-07

## 2022-04-07 PROBLEM — Z51.81 THERAPEUTIC DRUG MONITORING: Status: ACTIVE | Noted: 2022-04-07

## 2022-04-07 LAB
ALBUMIN SERPL-MCNC: 3.2 G/DL (ref 3.5–5.2)
ALP SERPL-CCNC: 176 U/L (ref 39–117)
ALT SERPL W P-5'-P-CCNC: 14 U/L (ref 1–41)
ANION GAP SERPL CALCULATED.3IONS-SCNC: 13.6 MMOL/L (ref 5–15)
ARTERIAL PATENCY WRIST A: POSITIVE
AST SERPL-CCNC: 17 U/L (ref 1–40)
BASE EXCESS BLDA CALC-SCNC: 0.5 MMOL/L (ref -2–2)
BASOPHILS # BLD AUTO: 0.01 10*3/MM3 (ref 0–0.2)
BASOPHILS NFR BLD AUTO: 0.1 % (ref 0–1.5)
BDY SITE: ABNORMAL
BILIRUB CONJ SERPL-MCNC: <0.2 MG/DL (ref 0–0.3)
BILIRUB INDIRECT SERPL-MCNC: ABNORMAL MG/DL
BILIRUB SERPL-MCNC: 0.2 MG/DL (ref 0–1.2)
BUN SERPL-MCNC: 58 MG/DL (ref 6–20)
BUN/CREAT SERPL: 25.2 (ref 7–25)
CALCIUM SPEC-SCNC: 9 MG/DL (ref 8.6–10.5)
CHLORIDE SERPL-SCNC: 91 MMOL/L (ref 98–107)
CO2 SERPL-SCNC: 25.4 MMOL/L (ref 22–29)
COHGB MFR BLD: 0.3 % (ref 0–1.5)
CREAT SERPL-MCNC: 2.3 MG/DL (ref 0.76–1.27)
CRP SERPL-MCNC: 9.73 MG/DL (ref 0–0.5)
DEPRECATED RDW RBC AUTO: 41.1 FL (ref 37–54)
EGFRCR SERPLBLD CKD-EPI 2021: 32.5 ML/MIN/1.73
EOSINOPHIL # BLD AUTO: 0 10*3/MM3 (ref 0–0.4)
EOSINOPHIL NFR BLD AUTO: 0 % (ref 0.3–6.2)
ERYTHROCYTE [DISTWIDTH] IN BLOOD BY AUTOMATED COUNT: 12.9 % (ref 12.3–15.4)
FHHB: 5.5 % (ref 0–5)
GAS FLOW AIRWAY: 6 LPM
GLUCOSE BLDC GLUCOMTR-MCNC: 223 MG/DL (ref 70–99)
GLUCOSE BLDC GLUCOMTR-MCNC: 295 MG/DL (ref 70–99)
GLUCOSE BLDC GLUCOMTR-MCNC: 346 MG/DL (ref 70–99)
GLUCOSE SERPL-MCNC: 340 MG/DL (ref 65–99)
HCO3 BLDA-SCNC: 28.5 MMOL/L (ref 22–26)
HCT VFR BLD AUTO: 28.7 % (ref 37.5–51)
HGB BLD-MCNC: 9.1 G/DL (ref 13–17.7)
HGB BLDA-MCNC: 10.3 G/DL (ref 13.8–16.4)
IMM GRANULOCYTES # BLD AUTO: 0.15 10*3/MM3 (ref 0–0.05)
IMM GRANULOCYTES NFR BLD AUTO: 0.9 % (ref 0–0.5)
LYMPHOCYTES # BLD AUTO: 0.73 10*3/MM3 (ref 0.7–3.1)
LYMPHOCYTES NFR BLD AUTO: 4.4 % (ref 19.6–45.3)
MCH RBC QN AUTO: 27.8 PG (ref 26.6–33)
MCHC RBC AUTO-ENTMCNC: 31.7 G/DL (ref 31.5–35.7)
MCV RBC AUTO: 87.8 FL (ref 79–97)
METHGB BLD QL: 0.3 % (ref 0–1.5)
MODALITY: ABNORMAL
MONOCYTES # BLD AUTO: 0.83 10*3/MM3 (ref 0.1–0.9)
MONOCYTES NFR BLD AUTO: 5 % (ref 5–12)
NEUTROPHILS NFR BLD AUTO: 14.78 10*3/MM3 (ref 1.7–7)
NEUTROPHILS NFR BLD AUTO: 89.6 % (ref 42.7–76)
NRBC BLD AUTO-RTO: 0 /100 WBC (ref 0–0.2)
OXYHGB MFR BLDV: 93.9 % (ref 94–99)
PCO2 BLDA: 65.1 MM HG (ref 35–45)
PH BLDA: 7.26 PH UNITS (ref 7.35–7.45)
PLATELET # BLD AUTO: 383 10*3/MM3 (ref 140–450)
PMV BLD AUTO: 9.4 FL (ref 6–12)
PO2 BLDA: 86.1 MM HG (ref 80–100)
POTASSIUM SERPL-SCNC: 4.9 MMOL/L (ref 3.5–5.2)
PROT SERPL-MCNC: 7.4 G/DL (ref 6–8.5)
RBC # BLD AUTO: 3.27 10*6/MM3 (ref 4.14–5.8)
SAO2 % BLDCOA: 94.5 % (ref 95–99)
SODIUM SERPL-SCNC: 130 MMOL/L (ref 136–145)
VANCOMYCIN SERPL-MCNC: 12.45 MCG/ML (ref 5–40)
WBC NRBC COR # BLD: 16.5 10*3/MM3 (ref 3.4–10.8)

## 2022-04-07 PROCEDURE — 25010000002 CEFEPIME PER 500 MG: Performed by: NURSE PRACTITIONER

## 2022-04-07 PROCEDURE — 25010000002 REMDESIVIR 100 MG RECONSTITUTED SOLUTION: Performed by: NURSE PRACTITIONER

## 2022-04-07 PROCEDURE — 82805 BLOOD GASES W/O2 SATURATION: CPT | Performed by: NURSE PRACTITIONER

## 2022-04-07 PROCEDURE — 80076 HEPATIC FUNCTION PANEL: CPT | Performed by: NURSE PRACTITIONER

## 2022-04-07 PROCEDURE — 83050 HGB METHEMOGLOBIN QUAN: CPT | Performed by: NURSE PRACTITIONER

## 2022-04-07 PROCEDURE — 94660 CPAP INITIATION&MGMT: CPT

## 2022-04-07 PROCEDURE — 99233 SBSQ HOSP IP/OBS HIGH 50: CPT | Performed by: INTERNAL MEDICINE

## 2022-04-07 PROCEDURE — 36600 WITHDRAWAL OF ARTERIAL BLOOD: CPT | Performed by: NURSE PRACTITIONER

## 2022-04-07 PROCEDURE — 82375 ASSAY CARBOXYHB QUANT: CPT | Performed by: NURSE PRACTITIONER

## 2022-04-07 PROCEDURE — 85025 COMPLETE CBC W/AUTO DIFF WBC: CPT | Performed by: HOSPITALIST

## 2022-04-07 PROCEDURE — 63710000001 INSULIN LISPRO (HUMAN) PER 5 UNITS: Performed by: INTERNAL MEDICINE

## 2022-04-07 PROCEDURE — 80202 ASSAY OF VANCOMYCIN: CPT | Performed by: NURSE PRACTITIONER

## 2022-04-07 PROCEDURE — 63710000001 INSULIN LISPRO (HUMAN) PER 5 UNITS: Performed by: HOSPITALIST

## 2022-04-07 PROCEDURE — 86140 C-REACTIVE PROTEIN: CPT | Performed by: INTERNAL MEDICINE

## 2022-04-07 PROCEDURE — 25010000002 METHYLPREDNISOLONE PER 40 MG: Performed by: HOSPITALIST

## 2022-04-07 PROCEDURE — 63710000001 INSULIN DETEMIR PER 5 UNITS: Performed by: INTERNAL MEDICINE

## 2022-04-07 PROCEDURE — 94799 UNLISTED PULMONARY SVC/PX: CPT

## 2022-04-07 PROCEDURE — 82962 GLUCOSE BLOOD TEST: CPT

## 2022-04-07 PROCEDURE — 99291 CRITICAL CARE FIRST HOUR: CPT | Performed by: INTERNAL MEDICINE

## 2022-04-07 PROCEDURE — 25010000002 VANCOMYCIN 5 G RECONSTITUTED SOLUTION: Performed by: NURSE PRACTITIONER

## 2022-04-07 PROCEDURE — 25010000002 FUROSEMIDE PER 20 MG: Performed by: HOSPITALIST

## 2022-04-07 PROCEDURE — 80048 BASIC METABOLIC PNL TOTAL CA: CPT | Performed by: HOSPITALIST

## 2022-04-07 PROCEDURE — 94761 N-INVAS EAR/PLS OXIMETRY MLT: CPT

## 2022-04-07 PROCEDURE — XW033E5 INTRODUCTION OF REMDESIVIR ANTI-INFECTIVE INTO PERIPHERAL VEIN, PERCUTANEOUS APPROACH, NEW TECHNOLOGY GROUP 5: ICD-10-PCS | Performed by: INTERNAL MEDICINE

## 2022-04-07 RX ORDER — FUROSEMIDE 10 MG/ML
40 INJECTION INTRAMUSCULAR; INTRAVENOUS DAILY
Status: DISCONTINUED | OUTPATIENT
Start: 2022-04-08 | End: 2022-04-11 | Stop reason: HOSPADM

## 2022-04-07 RX ADMIN — TOBRAMYCIN 300 MG: 300 SOLUTION RESPIRATORY (INHALATION) at 13:36

## 2022-04-07 RX ADMIN — INSULIN LISPRO 7 UNITS: 100 INJECTION, SOLUTION INTRAVENOUS; SUBCUTANEOUS at 08:26

## 2022-04-07 RX ADMIN — METHYLPREDNISOLONE SODIUM SUCCINATE 40 MG: 40 INJECTION, POWDER, FOR SOLUTION INTRAMUSCULAR; INTRAVENOUS at 04:38

## 2022-04-07 RX ADMIN — METOPROLOL TARTRATE 100 MG: 50 TABLET, FILM COATED ORAL at 22:09

## 2022-04-07 RX ADMIN — METHYLPREDNISOLONE SODIUM SUCCINATE 40 MG: 40 INJECTION, POWDER, FOR SOLUTION INTRAMUSCULAR; INTRAVENOUS at 17:41

## 2022-04-07 RX ADMIN — INSULIN LISPRO 15 UNITS: 100 INJECTION, SOLUTION INTRAVENOUS; SUBCUTANEOUS at 12:03

## 2022-04-07 RX ADMIN — ARFORMOTEROL TARTRATE 15 MCG: 15 SOLUTION RESPIRATORY (INHALATION) at 06:30

## 2022-04-07 RX ADMIN — TAMSULOSIN HYDROCHLORIDE 0.4 MG: 0.4 CAPSULE ORAL at 17:41

## 2022-04-07 RX ADMIN — ARFORMOTEROL TARTRATE 15 MCG: 15 SOLUTION RESPIRATORY (INHALATION) at 18:22

## 2022-04-07 RX ADMIN — FAMOTIDINE 20 MG: 20 TABLET ORAL at 17:42

## 2022-04-07 RX ADMIN — ATORVASTATIN CALCIUM 20 MG: 20 TABLET, FILM COATED ORAL at 22:09

## 2022-04-07 RX ADMIN — VANCOMYCIN HYDROCHLORIDE 1000 MG: 5 INJECTION, POWDER, LYOPHILIZED, FOR SOLUTION INTRAVENOUS at 13:47

## 2022-04-07 RX ADMIN — APIXABAN 5 MG: 5 TABLET, FILM COATED ORAL at 22:09

## 2022-04-07 RX ADMIN — BUDESONIDE 0.5 MG: 0.5 SUSPENSION RESPIRATORY (INHALATION) at 18:22

## 2022-04-07 RX ADMIN — INSULIN DETEMIR 50 UNITS: 100 INJECTION, SOLUTION SUBCUTANEOUS at 22:08

## 2022-04-07 RX ADMIN — INSULIN LISPRO 15 UNITS: 100 INJECTION, SOLUTION INTRAVENOUS; SUBCUTANEOUS at 08:26

## 2022-04-07 RX ADMIN — REMDESIVIR 100 MG: 100 INJECTION, POWDER, LYOPHILIZED, FOR SOLUTION INTRAVENOUS at 12:02

## 2022-04-07 RX ADMIN — GUAIFENESIN 1200 MG: 600 TABLET ORAL at 22:09

## 2022-04-07 RX ADMIN — INSULIN LISPRO 4 UNITS: 100 INJECTION, SOLUTION INTRAVENOUS; SUBCUTANEOUS at 12:03

## 2022-04-07 RX ADMIN — HYDRALAZINE HYDROCHLORIDE 25 MG: 25 TABLET, FILM COATED ORAL at 22:08

## 2022-04-07 RX ADMIN — CEFEPIME 2 G: 1 INJECTION, SOLUTION INTRAVENOUS at 21:27

## 2022-04-07 RX ADMIN — BUDESONIDE 0.5 MG: 0.5 SUSPENSION RESPIRATORY (INHALATION) at 06:30

## 2022-04-07 RX ADMIN — DULOXETINE HYDROCHLORIDE 60 MG: 30 CAPSULE, DELAYED RELEASE ORAL at 22:09

## 2022-04-07 RX ADMIN — INSULIN LISPRO 15 UNITS: 100 INJECTION, SOLUTION INTRAVENOUS; SUBCUTANEOUS at 17:42

## 2022-04-07 RX ADMIN — CEFEPIME 2 G: 1 INJECTION, SOLUTION INTRAVENOUS at 08:26

## 2022-04-07 RX ADMIN — Medication 10 ML: at 22:08

## 2022-04-07 RX ADMIN — TRAZODONE HYDROCHLORIDE 100 MG: 100 TABLET ORAL at 22:08

## 2022-04-07 RX ADMIN — IPRATROPIUM BROMIDE AND ALBUTEROL SULFATE 3 ML: 2.5; .5 SOLUTION RESPIRATORY (INHALATION) at 00:15

## 2022-04-07 RX ADMIN — IPRATROPIUM BROMIDE AND ALBUTEROL SULFATE 3 ML: 2.5; .5 SOLUTION RESPIRATORY (INHALATION) at 06:30

## 2022-04-07 RX ADMIN — INSULIN LISPRO 6 UNITS: 100 INJECTION, SOLUTION INTRAVENOUS; SUBCUTANEOUS at 17:41

## 2022-04-07 RX ADMIN — IPRATROPIUM BROMIDE AND ALBUTEROL SULFATE 3 ML: 2.5; .5 SOLUTION RESPIRATORY (INHALATION) at 13:35

## 2022-04-07 RX ADMIN — IPRATROPIUM BROMIDE AND ALBUTEROL SULFATE 3 ML: 2.5; .5 SOLUTION RESPIRATORY (INHALATION) at 18:22

## 2022-04-07 RX ADMIN — GABAPENTIN 300 MG: 300 CAPSULE ORAL at 22:08

## 2022-04-07 RX ADMIN — TOBRAMYCIN 300 MG: 300 SOLUTION RESPIRATORY (INHALATION) at 00:16

## 2022-04-07 RX ADMIN — FUROSEMIDE 40 MG: 10 INJECTION, SOLUTION INTRAMUSCULAR; INTRAVENOUS at 08:27

## 2022-04-07 NOTE — NURSING NOTE
Attempted to give PT AM meds. Pt was agreeable to take AM meds but was unable to stay awake long enough to take. MD made aware. Non PO meds given as ordered.

## 2022-04-07 NOTE — PROGRESS NOTES
[facility]   Hospitalist Progress Note  Date: 2022  Patient Name: Preston Wallis  : 1965  MRN: [id number]  Date of admission: 2022      Subjective   Subjective     Chief Complaint: follow up for shortness of breath    Summary: 55 y/o M with COPD, sleep apnea, noncompliance with CPAP/inhalers, CKD stage III, type II DM with complications who presented with worsening shortness of air.  Oxygen saturation 89% on 4 L.  CT of the chest showed central bronchiectasis with surrounding consolidation involving both lungs + pulmonary edema.  Covid positive  On broad-spectrum antibiotics, diuretics. Pulmonary on board    Interval Followup: No fevers overnight.  Hemodynamically stable.  More confused this morning.  On BiPAP.  pH 7.26, PCO2 65, PO2 86.  Patient lethargic on my exam will awake to stimuli but dozing off to sleep not answering any questions.    Review of Systems  All other systems reviewed and negative unless stated above    Objective   Objective     Vitals:   Temp:  [97.3 °F (36.3 °C)-98.2 °F (36.8 °C)] 97.3 °F (36.3 °C)  Heart Rate:  [67-86] 84  Resp:  [18-22] 20  BP: (113-155)/(49-67) 155/67  Flow (L/min):  [3.5-8] 3.5  Physical Exam    Constitutional:  male, up in bed on BiPAP, lethargic but arousable   Eyes: Pupils equal and reactive, no conjunctival injection   HENT: NCAT, nares patent, MMM   Neck: Supple, trachea midline   Respiratory: Diminished aeration bilaterally with bilateral wheezing, nonlabored respiration   Cardiovascular: RRR, no murmurs, trace BLE    Gastrointestinal: Positive bowel sounds, soft, nontender, nondistended   Musculoskeletal: No gross deformities, no clubbing or cyanosis to extremities   Neurologic: Suboptimally responsive, Cranial Nerves grossly intact speech clear   Skin: Warm and dry, no rashes     Result Review    Result Review:  I have personally reviewed the results from the time of this admission to 2022 17:04 EDT and agree with these  findings:  [x]  Laboratory  CBC    CBC 3/17/22 4/5/22 4/5/22 4/7/22     1529 2240    WBC 10.63 14.56 (A) 13.49 (A) 16.50 (A)   RBC 3.91 (A) 3.57 (A) 3.66 (A) 3.27 (A)   Hemoglobin 10.8 (A) 10.0 (A) 10.2 (A) 9.1 (A)   Hematocrit 35.2 (A) 32.7 (A) 33.4 (A) 28.7 (A)   MCV 90.0 91.6 91.3 87.8   MCH 27.6 28.0 27.9 27.8   MCHC 30.7 (A) 30.6 (A) 30.5 (A) 31.7   RDW 12.3 12.7 12.6 12.9   Platelets 397 340 346 383   (A) Abnormal value            BMP    BMP 3/17/22 4/5/22 4/5/22 4/7/22     1529 2240    BUN 17 40 (A) 43 (A) 58 (A)   Creatinine 1.53 (A) 1.82 (A) 2.05 (A) 2.30 (A)   Sodium 138 135 (A) 135 (A) 130 (A)   Potassium 3.5 4.4 5.4 (A) 4.9   Chloride 95 (A) 95 (A) 94 (A) 91 (A)   CO2 32.7 (A) 29.8 (A) 27.0 25.4   Calcium 9.4 9.4 9.2 9.0   (A) Abnormal value                [x]  Microbiology respiratory panel positive for Covid  MRSA PCR positive  Blood cultures pending  [x]  Radiology  [x]  EKG/Telemetry NSR, PVCs  []  Cardiology/Vascular   []  Pathology  []  Old records  [x]  Other:     Intake/Output Summary (Last 24 hours) at 4/7/2022 1712  Last data filed at 4/7/2022 1300  Gross per 24 hour   Intake 600 ml   Output 2550 ml   Net -1950 ml         Assessment/Plan   Assessment / Plan     Assessment/Plan:  Active Hospital Problems    Diagnosis  POA   • **Acute on chronic respiratory failure with hypoxia (HCC) [J96.21]  Unknown   • Acute respiratory failure with hypercapnia (HCC) [J96.02]  Unknown   • Type 2 diabetes mellitus with hyperglycemia (HCC) [E11.65]  Unknown   • Therapeutic drug monitoring [Z51.81]  Not Applicable   • Obesity (BMI 30-39.9) [E66.9]  Unknown   • Chronic anticoagulation [Z79.01]  Not Applicable   • Paroxysmal atrial fibrillation (HCC) [I48.0]  Yes   • Essential (primary) hypertension [I10]  Yes   • Multifocal pneumonia [J18.9]  Yes   • Acute hypoxemic respiratory failure due to COVID-19 (HCC) [U07.1, J96.01]  Yes   • COPD with exacerbation (HCC) [J44.1]  Yes          · Covid returned positive on  respiratory panel PCR.  Continue enhanced airborne isolation. Continue IV remdesivir.  Day 2. Daily CMP to monitor creatinine and liver enzymes  · Continued on BiPAP 12/5 due to elevated pCO2 and confusion.  If more alert this afternoon, will transition back to NC, SPO2 goal >88-92%.   · Continue IV steroids per pulmonology recommendation  · Continue Brovana and Pulmicort nebs twice daily  · Continue bronchopulmonary and bronchodilator protocol  · MRSA PCR positive.  Agree with IV vancomycin.  Continue IV cefepime.  Day 2.  Started on tobramycin nebs.  Blood cultures no growth to date.  Sputum culture pending.  · 2L net negative over last 24 hours. Cr 2.05 -> 2.30.  Adjust IV Lasix to 40 mg daily. TTE pending  · Remains in NSR.  Continue p.o. diltiazem  mg + Lopressor 100 mg twice daily. Continue Eliquis  · Blood sugar still high.  Adjust Levemir to 50 units q. nightly.  Increase mealtime Humalog to 15 units three times a day.  Continue moderate dose SSI  · Continue baby aspirin and statin  · A.m. labs ordered    Discussed plan with staff.     DVT prophylaxis:  Medical DVT prophylaxis orders are present.    CODE STATUS:   Level Of Support Discussed With: Patient  Code Status (Patient has no pulse and is not breathing): CPR (Attempt to Resuscitate)  Medical Interventions (Patient has pulse or is breathing): Full Support    Electronically signed by Leonard Multani DO, 04/07/22, 5:05 PM EDT.

## 2022-04-07 NOTE — PLAN OF CARE
Goal Outcome Evaluation:     Pt sat up in chair for several hours prior to bedtime, was able to transfer from chair to bed with assist x 1 and 02 sats did not drop, pt does not think diagnosis of Covid is correct and states he wants to talk to doctor this morning to be retested, pt still short of air on exertion but recovers quickly, will continue to monitor

## 2022-04-07 NOTE — PLAN OF CARE
Goal Outcome Evaluation:  Pt has been stable throughout the shift. Pt has been in recliner for the majority of the day. Pt wife was at bedside some earlier.

## 2022-04-07 NOTE — PROGRESS NOTES
Pulmonary / Critical Care Progress Note      Patient Name: Preston Wallis  : 1965  MRN: [id number]  Attending:  Leonard Multani DO  Date of admission: 2022    Subjective   Subjective   Follow-up for acute on chronic hypoxic respiratory failure secondary to COVID-19.    Over past 24 hours, has been weaned to 6 L nasal cannula.  Was started on steroids and remdesivir for diagnosis of COVID-19.  Continues on antibiotics and nebulizers.    No acute events overnight.    This morning,  Lying in bed on NIPPV /5 with FiO2 40% O2 sats 97%  Patient is very sleepy this morning  Does arouse some trying to answer questions  Feels slightly better  Diuresing well  -1 L fluid balance  Continues with 1-2+ pitting edema  Nonproductive cough  No chest pain  No fever or chills  Weak and fatigued    Review of Systems  General: Fatigue, otherwise denied complaints  Cardiovascular: Orthopnea, leg swelling, otherwise denied complaints  Respiratory: Dyspnea, cough, otherwise denied complaints  Gastrointestinal: Denied complaints  Musculoskeletal: Weakness, otherwise denied complaints    Objective   Objective     Vitals:   Temp:  [97.7 °F (36.5 °C)-98.4 °F (36.9 °C)] 97.9 °F (36.6 °C)  Heart Rate:  [63-86] 81  Resp:  [18-24] 18  BP: (130-152)/(51-66) 138/60  Flow (L/min):  [6-15] 6    Physical Exam   Vital Signs Reviewed   General: Obese male, lethargic, NAD on NIPPV 12/5   HEENT:  Dentition poor, Mallampati 4/4 PERRL, EOMI.  OP, nares clear  Chest: Shallow breaths, clear to auscultation bilaterally, mild work of breathing noted  CV: NSR 76, no MGR, pulses 2+, equal  Abd:  Obese, Soft, NT, ND, + BS, no HSM  EXT:  no clubbing, no cyanosis, 1-2+ BLE edema  Neuro:  A&Ox3, CN grossly intact, no focal deficits  Skin: No rashes or lesions noted    Result Review    Result Review:  I have personally reviewed the results from the time of this admission to 2022 06:43 EDT and agree with these findings:  [x]  Laboratory  [x]   Microbiology  [x]  Radiology  [x]  EKG/Telemetry   []  Cardiology/Vascular   []  Pathology  []  Old records  []  Other:  Most notable findings include: Pro-Cristi 0.29, potassium 4.9, creatinine 2.3  CRP trending down 16.7 --> 9.73  D-dimer 1.5  Ferritin 146    4/7 0630 ABG --> 7.25, 65.1, 86.1, 28.5 on 6 L nasal cannula    4/6 Covid PCR positive  Covid rapid swab negative  Strep and Legionella negative  MRSA PCR positive    Assessment/Plan   Assessment / Plan     Active Hospital Problems:  Active Hospital Problems    Diagnosis    • **Acute on chronic respiratory failure with hypoxia (HCC)    • Obesity (BMI 30-39.9)    • Chronic anticoagulation    • Paroxysmal atrial fibrillation (HCC)    • Essential (primary) hypertension    • Multifocal pneumonia    • COPD with exacerbation ([facility])      Impression:  1. MRSA pneumonia: History of Covid pneumonia 10/2021, Pseudomonas pneumonia 2019 and 2020., MRSA pneumonia 1/2019     2. Covid: positive 4/6/22, re-infection from 10/2021, unvaccinated      --> elevated inflammatory markers     3.  Acute exacerbation of heart failure: proBNP 1966, unknown EF  CT with small bilateral pleural effusions and pulmonary edema pattern     4. Acute exacerbation bronchiectasis: ?? smart vest in the past     5. Acute exacerbation COPD: clinically severe, FEV1 of 21% in 2008, 24-pack-year smoking history   a) oxygen dependent   b) chronic bronchitis   c) bronchiectasis   d) recurrent pneumonia     6. Acute on chronic hypoxic respiratory failure: Secondary to above     7. ERIC on chronic kidney disease: Baseline creatinine 1.5, diabetic nephropathy     8. MILADY: PSG unavailable, noncompliant with BiPAP, 2019 overnight oximetry with 100 minutes of desaturations low was 81%     9. Paroxysmal A. Fib: On chronic anticoagulation Eliquis     10. Pulmonary embolism: 2019 on Eliquis     11. NIDDM: 3/22 A1c 8.3     12.  Obesity: BMI 40     13.  Medical noncompliance    Plan:    ABG --> 7.25, 65.1, 86.1, 28.5 on 6  L nasal cannula.  Was placed on NIPPV 12/5 with FiO2 40%.  Pulling good tidal volumes.  Continue NIPPV today with breaks for meals.  Continue to wean O2 to keep sats 88-92%.    Continue aggressive airway clearance with MetaNeb.  Continue Pulmicort, Brovana, and DuoNebs.  Continue bronchopulmonary hygiene. Encourage I-S and flutter valve.  Continue Mucinex.     MRSA PCR positive.  Pending sputum culture.  Continue vancomycin and cefepime. Continue tobramycin nebulizers. De-escalate based on cultures.      COVID-19 PCR positive.  Continue Remdesivir, day 2 of 5.  Not a candidate for Actemra at this time due to MRSA pneumonia.  Continue Solu-Medrol 40 mg IV twice daily.  Continue to trend inflammatory markers every 48 hours.     Continue Lasix 40 mg IV twice daily.  Trend renal panel and electrolytes.  Pending echocardiogram.     Continue Eliquis.     Pending alpha 1 antitrypsin and phenotype.     Encourage activity.  Up to chair as tolerated.  PT/OT on board.     Speech therapy on board and appreciate recommendations.  No overt signs of aspiration noted.  Regular diet with thin liquids.     RT  assist patient in getting nasal pillows for home BiPAP machine, chest vest, and arrange for pulmonary rehab.     Not up-to-date on vaccines: No Covid/pneumonia/flu vaccines obtained     Would benefit from outpatient pulmonary rehab.  Will need outpatient PFTs.  Will need to reestablish care in our office.    DVT prophylaxis:  Medical DVT prophylaxis orders are present.    CODE STATUS:   Level Of Support Discussed With: Patient  Code Status (Patient has no pulse and is not breathing): CPR (Attempt to Resuscitate)  Medical Interventions (Patient has pulse or is breathing): Full Support    Labs, microbiology, radiology, medications, and provider notes personally reviewed.  Discussed with primary services and bedside RN.  Remains critically ill with multiple medical problems/frail and tenuous guarded status/40  minutes    Electronically signed by CON Garcia, 04/07/22, 11:59 AM EDT.  Electronically signed by Mick Lopes MD, 04/07/22, 6:43 AM EDT.

## 2022-04-08 LAB
A1AT PHENOTYP SERPL IFE: ABNORMAL
A1AT SERPL-MCNC: 252 MG/DL (ref 101–187)
ALBUMIN SERPL-MCNC: 3.4 G/DL (ref 3.5–5.2)
ALBUMIN/GLOB SERPL: 0.8 G/DL
ALP SERPL-CCNC: 160 U/L (ref 39–117)
ALT SERPL W P-5'-P-CCNC: 15 U/L (ref 1–41)
ANION GAP SERPL CALCULATED.3IONS-SCNC: 13.1 MMOL/L (ref 5–15)
AST SERPL-CCNC: 16 U/L (ref 1–40)
BILIRUB SERPL-MCNC: <0.2 MG/DL (ref 0–1.2)
BUN SERPL-MCNC: 59 MG/DL (ref 6–20)
BUN/CREAT SERPL: 26.7 (ref 7–25)
CALCIUM SPEC-SCNC: 9.4 MG/DL (ref 8.6–10.5)
CHLORIDE SERPL-SCNC: 95 MMOL/L (ref 98–107)
CO2 SERPL-SCNC: 26.9 MMOL/L (ref 22–29)
CREAT SERPL-MCNC: 2.21 MG/DL (ref 0.76–1.27)
DEPRECATED RDW RBC AUTO: 42.5 FL (ref 37–54)
EGFRCR SERPLBLD CKD-EPI 2021: 34.1 ML/MIN/1.73
ERYTHROCYTE [DISTWIDTH] IN BLOOD BY AUTOMATED COUNT: 13.2 % (ref 12.3–15.4)
GLOBULIN UR ELPH-MCNC: 4.2 GM/DL
GLUCOSE BLDC GLUCOMTR-MCNC: 201 MG/DL (ref 70–99)
GLUCOSE BLDC GLUCOMTR-MCNC: 233 MG/DL (ref 70–99)
GLUCOSE BLDC GLUCOMTR-MCNC: 256 MG/DL (ref 70–99)
GLUCOSE BLDC GLUCOMTR-MCNC: 311 MG/DL (ref 70–99)
GLUCOSE SERPL-MCNC: 288 MG/DL (ref 65–99)
HCT VFR BLD AUTO: 32.2 % (ref 37.5–51)
HGB BLD-MCNC: 10.2 G/DL (ref 13–17.7)
MCH RBC QN AUTO: 27.9 PG (ref 26.6–33)
MCHC RBC AUTO-ENTMCNC: 31.7 G/DL (ref 31.5–35.7)
MCV RBC AUTO: 88 FL (ref 79–97)
PLATELET # BLD AUTO: 392 10*3/MM3 (ref 140–450)
PMV BLD AUTO: 9.3 FL (ref 6–12)
POTASSIUM SERPL-SCNC: 4.5 MMOL/L (ref 3.5–5.2)
PROT SERPL-MCNC: 7.6 G/DL (ref 6–8.5)
RBC # BLD AUTO: 3.66 10*6/MM3 (ref 4.14–5.8)
SODIUM SERPL-SCNC: 135 MMOL/L (ref 136–145)
WBC NRBC COR # BLD: 12.92 10*3/MM3 (ref 3.4–10.8)

## 2022-04-08 PROCEDURE — 94660 CPAP INITIATION&MGMT: CPT

## 2022-04-08 PROCEDURE — 97110 THERAPEUTIC EXERCISES: CPT

## 2022-04-08 PROCEDURE — 99233 SBSQ HOSP IP/OBS HIGH 50: CPT | Performed by: INTERNAL MEDICINE

## 2022-04-08 PROCEDURE — 94799 UNLISTED PULMONARY SVC/PX: CPT

## 2022-04-08 PROCEDURE — 82962 GLUCOSE BLOOD TEST: CPT

## 2022-04-08 PROCEDURE — 63710000001 INSULIN LISPRO (HUMAN) PER 5 UNITS: Performed by: HOSPITALIST

## 2022-04-08 PROCEDURE — 94664 DEMO&/EVAL PT USE INHALER: CPT

## 2022-04-08 PROCEDURE — 25010000002 REMDESIVIR 100 MG RECONSTITUTED SOLUTION: Performed by: NURSE PRACTITIONER

## 2022-04-08 PROCEDURE — 63710000001 INSULIN LISPRO (HUMAN) PER 5 UNITS: Performed by: INTERNAL MEDICINE

## 2022-04-08 PROCEDURE — 80053 COMPREHEN METABOLIC PANEL: CPT | Performed by: INTERNAL MEDICINE

## 2022-04-08 PROCEDURE — 94761 N-INVAS EAR/PLS OXIMETRY MLT: CPT

## 2022-04-08 PROCEDURE — 25010000002 METHYLPREDNISOLONE PER 40 MG: Performed by: HOSPITALIST

## 2022-04-08 PROCEDURE — 25010000002 VANCOMYCIN 5 G RECONSTITUTED SOLUTION: Performed by: NURSE PRACTITIONER

## 2022-04-08 PROCEDURE — 63710000001 INSULIN DETEMIR PER 5 UNITS: Performed by: INTERNAL MEDICINE

## 2022-04-08 PROCEDURE — 97530 THERAPEUTIC ACTIVITIES: CPT

## 2022-04-08 PROCEDURE — 25010000002 CEFEPIME PER 500 MG: Performed by: NURSE PRACTITIONER

## 2022-04-08 PROCEDURE — 25010000002 FUROSEMIDE PER 20 MG: Performed by: INTERNAL MEDICINE

## 2022-04-08 PROCEDURE — 85027 COMPLETE CBC AUTOMATED: CPT | Performed by: INTERNAL MEDICINE

## 2022-04-08 RX ORDER — SODIUM CHLORIDE FOR INHALATION 3 %
4 VIAL, NEBULIZER (ML) INHALATION ONCE AS NEEDED
Status: DISCONTINUED | OUTPATIENT
Start: 2022-04-08 | End: 2022-04-11 | Stop reason: HOSPADM

## 2022-04-08 RX ADMIN — IPRATROPIUM BROMIDE AND ALBUTEROL SULFATE 3 ML: 2.5; .5 SOLUTION RESPIRATORY (INHALATION) at 11:42

## 2022-04-08 RX ADMIN — VANCOMYCIN HYDROCHLORIDE 1000 MG: 5 INJECTION, POWDER, LYOPHILIZED, FOR SOLUTION INTRAVENOUS at 14:11

## 2022-04-08 RX ADMIN — HYDRALAZINE HYDROCHLORIDE 25 MG: 25 TABLET, FILM COATED ORAL at 21:01

## 2022-04-08 RX ADMIN — METOPROLOL TARTRATE 100 MG: 50 TABLET, FILM COATED ORAL at 21:01

## 2022-04-08 RX ADMIN — INSULIN LISPRO 6 UNITS: 100 INJECTION, SOLUTION INTRAVENOUS; SUBCUTANEOUS at 12:19

## 2022-04-08 RX ADMIN — GUAIFENESIN 1200 MG: 600 TABLET ORAL at 08:01

## 2022-04-08 RX ADMIN — ARFORMOTEROL TARTRATE 15 MCG: 15 SOLUTION RESPIRATORY (INHALATION) at 06:34

## 2022-04-08 RX ADMIN — BUDESONIDE 0.5 MG: 0.5 SUSPENSION RESPIRATORY (INHALATION) at 06:34

## 2022-04-08 RX ADMIN — FAMOTIDINE 20 MG: 20 TABLET ORAL at 17:51

## 2022-04-08 RX ADMIN — Medication 10 ML: at 08:01

## 2022-04-08 RX ADMIN — HYDRALAZINE HYDROCHLORIDE 25 MG: 25 TABLET, FILM COATED ORAL at 08:02

## 2022-04-08 RX ADMIN — GUAIFENESIN 1200 MG: 600 TABLET ORAL at 21:01

## 2022-04-08 RX ADMIN — INSULIN LISPRO 4 UNITS: 100 INJECTION, SOLUTION INTRAVENOUS; SUBCUTANEOUS at 08:02

## 2022-04-08 RX ADMIN — APIXABAN 5 MG: 5 TABLET, FILM COATED ORAL at 08:01

## 2022-04-08 RX ADMIN — IPRATROPIUM BROMIDE AND ALBUTEROL SULFATE 3 ML: 2.5; .5 SOLUTION RESPIRATORY (INHALATION) at 00:31

## 2022-04-08 RX ADMIN — DILTIAZEM HYDROCHLORIDE 120 MG: 120 CAPSULE, COATED, EXTENDED RELEASE ORAL at 08:01

## 2022-04-08 RX ADMIN — FUROSEMIDE 40 MG: 10 INJECTION, SOLUTION INTRAMUSCULAR; INTRAVENOUS at 09:48

## 2022-04-08 RX ADMIN — METHYLPREDNISOLONE SODIUM SUCCINATE 40 MG: 40 INJECTION, POWDER, FOR SOLUTION INTRAMUSCULAR; INTRAVENOUS at 17:51

## 2022-04-08 RX ADMIN — ATORVASTATIN CALCIUM 20 MG: 20 TABLET, FILM COATED ORAL at 21:00

## 2022-04-08 RX ADMIN — IPRATROPIUM BROMIDE AND ALBUTEROL SULFATE 3 ML: 2.5; .5 SOLUTION RESPIRATORY (INHALATION) at 18:30

## 2022-04-08 RX ADMIN — INSULIN LISPRO 4 UNITS: 100 INJECTION, SOLUTION INTRAVENOUS; SUBCUTANEOUS at 21:09

## 2022-04-08 RX ADMIN — REMDESIVIR 100 MG: 100 INJECTION, POWDER, LYOPHILIZED, FOR SOLUTION INTRAVENOUS at 14:11

## 2022-04-08 RX ADMIN — INSULIN LISPRO 7 UNITS: 100 INJECTION, SOLUTION INTRAVENOUS; SUBCUTANEOUS at 18:17

## 2022-04-08 RX ADMIN — GABAPENTIN 300 MG: 300 CAPSULE ORAL at 21:00

## 2022-04-08 RX ADMIN — METHYLPREDNISOLONE SODIUM SUCCINATE 40 MG: 40 INJECTION, POWDER, FOR SOLUTION INTRAMUSCULAR; INTRAVENOUS at 04:53

## 2022-04-08 RX ADMIN — INSULIN DETEMIR 50 UNITS: 100 INJECTION, SOLUTION SUBCUTANEOUS at 21:09

## 2022-04-08 RX ADMIN — DULOXETINE HYDROCHLORIDE 60 MG: 30 CAPSULE, DELAYED RELEASE ORAL at 08:01

## 2022-04-08 RX ADMIN — TRAZODONE HYDROCHLORIDE 100 MG: 100 TABLET ORAL at 21:01

## 2022-04-08 RX ADMIN — METOPROLOL TARTRATE 100 MG: 50 TABLET, FILM COATED ORAL at 08:01

## 2022-04-08 RX ADMIN — TAMSULOSIN HYDROCHLORIDE 0.4 MG: 0.4 CAPSULE ORAL at 17:51

## 2022-04-08 RX ADMIN — TOBRAMYCIN 300 MG: 300 SOLUTION RESPIRATORY (INHALATION) at 00:31

## 2022-04-08 RX ADMIN — CEFEPIME 2 G: 1 INJECTION, SOLUTION INTRAVENOUS at 08:01

## 2022-04-08 RX ADMIN — DULOXETINE HYDROCHLORIDE 60 MG: 30 CAPSULE, DELAYED RELEASE ORAL at 21:00

## 2022-04-08 RX ADMIN — ASPIRIN 81 MG: 81 TABLET, COATED ORAL at 08:01

## 2022-04-08 RX ADMIN — CEFEPIME 2 G: 1 INJECTION, SOLUTION INTRAVENOUS at 21:00

## 2022-04-08 RX ADMIN — APIXABAN 5 MG: 5 TABLET, FILM COATED ORAL at 21:01

## 2022-04-08 RX ADMIN — FERROUS SULFATE TAB 325 MG (65 MG ELEMENTAL FE) 325 MG: 325 (65 FE) TAB at 08:01

## 2022-04-08 RX ADMIN — INSULIN LISPRO 20 UNITS: 100 INJECTION, SOLUTION INTRAVENOUS; SUBCUTANEOUS at 08:00

## 2022-04-08 RX ADMIN — ARFORMOTEROL TARTRATE 15 MCG: 15 SOLUTION RESPIRATORY (INHALATION) at 18:30

## 2022-04-08 RX ADMIN — INSULIN LISPRO 20 UNITS: 100 INJECTION, SOLUTION INTRAVENOUS; SUBCUTANEOUS at 18:17

## 2022-04-08 RX ADMIN — IPRATROPIUM BROMIDE AND ALBUTEROL SULFATE 3 ML: 2.5; .5 SOLUTION RESPIRATORY (INHALATION) at 06:34

## 2022-04-08 RX ADMIN — BUDESONIDE 0.5 MG: 0.5 SUSPENSION RESPIRATORY (INHALATION) at 18:30

## 2022-04-08 RX ADMIN — INSULIN LISPRO 20 UNITS: 100 INJECTION, SOLUTION INTRAVENOUS; SUBCUTANEOUS at 12:19

## 2022-04-08 RX ADMIN — TOBRAMYCIN 300 MG: 300 SOLUTION RESPIRATORY (INHALATION) at 11:42

## 2022-04-08 RX ADMIN — FAMOTIDINE 20 MG: 20 TABLET ORAL at 08:01

## 2022-04-08 NOTE — THERAPY TREATMENT NOTE
Acute Care - Physical Therapy Treatment Note   [facility]     Patient Name: Preston Wallis  : 1965  MRN: [id number]  Today's Date: 2022      Visit Dx:     ICD-10-CM ICD-9-CM   1. Multifocal pneumonia  J18.9 486   2. Chronic obstructive pulmonary disease, unspecified COPD type ([facility])  J44.9 496   3. Non-STEMI (non-ST elevated myocardial infarction) ([facility])  I21.4 410.70   4. Oropharyngeal dysphagia  R13.12 787.22   5. Difficulty walking  R26.2 719.7   6. Decreased activities of daily living (ADL)  Z78.9 V49.89   7. Obesity (BMI 30-39.9)  E66.9 278.00   8. Obstructive sleep apnea (adult) (pediatric)  G47.33 327.23   9. COVID-19  U07.1 079.89     Patient Active Problem List   Diagnosis   • COPD with exacerbation ([facility])   • Cough   • Acute hypoxemic respiratory failure due to COVID-19 ([facility])   • Type 1 diabetes mellitus with diabetic chronic kidney disease ([facility])   • Polyneuropathy   • Multifocal pneumonia   • Peripheral neuropathy   • Polyneuropathy   • Paroxysmal atrial fibrillation ([facility])   • Obstructive sleep apnea (adult) (pediatric)   • MRSA pneumonia ([facility])   • Low back pain   • Insomnia   • Essential (primary) hypertension   • Chronic kidney disease   • Chronic diastolic (congestive) heart failure ([facility])   • Anemia   • Allergies   • Acute on chronic respiratory failure with hypoxia ([facility])   • COPD (chronic obstructive pulmonary disease) ([facility])   • Obesity (BMI 30-39.9)   • Chronic anticoagulation   • Acute respiratory failure with hypercapnia ([facility])   • Type 2 diabetes mellitus with hyperglycemia ([facility])   • Therapeutic drug monitoring     Past Medical History:   Diagnosis Date   • Age-related cognitive decline    • Allergic contact dermatitis    • Allergies    • Anemia    • Bronchiectasis with acute lower respiratory infection ([facility])    • Chest pain    • Chronic bronchitis ([facility])    • Chronic diastolic (congestive) heart failure ([facility])    • Chronic kidney disease    • Chronic respiratory failure with hypoxia ([facility])     • COPD (chronic obstructive pulmonary disease) ([facility])    • COPD with acute exacerbation ([facility])    • Cough    • Dependence on supplemental oxygen    • Eczema    • Erectile dysfunction     due to organic reasons   • Essential (primary) hypertension    • Fracture     closed fracture of other tarsal and metatarsal bones   • GERD without esophagitis    • High risk medication use    • Hypercholesteremia    • Hypomagnesemia    • Insomnia    • Low back pain    • Major depressive disorder    • Major depressive disorder    • Morbid (severe) obesity due to excess calories ([facility])    • MRSA pneumonia ([facility])    • Muscle weakness    • 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 without evidence of necrosis ([facility])    • Obstructive sleep apnea (adult) (pediatric)    • Other forms of dyspnea    • Other long term (current) drug therapy    • Other pulmonary embolism without acute cor pulmonale ([facility])    • Other specified noninfective gastroenteritis and colitis    • Other spondylosis, lumbar region    • Pain in both knees    • Paroxysmal atrial fibrillation ([facility])    • Peripheral neuropathy     attributed to type 2 diabetes   • Pneumonia, unspecified organism    • Polyneuropathy    • Rash and other nonspecific skin eruption    • Syncope and collapse    • Tachycardia    • Type 1 diabetes mellitus with diabetic chronic kidney disease ([facility])    • Type 2 diabetes mellitus ([facility])    • Unspecified fall, initial encounter      Past Surgical History:   Procedure Laterality Date   • CHOLECYSTECTOMY     • KNEE SURGERY Left    • OTHER SURGICAL HISTORY Left     venous port   • TONSILLECTOMY AND ADENOIDECTOMY       PT Assessment (last 12 hours)     PT Evaluation and Treatment     Row Name 04/08/22 0839          Physical Therapy Time and Intention    Subjective Information no complaints  -WM     Document Type therapy note (daily note)  -WM     Mode of Treatment individual therapy;physical therapy  -WM     Patient  Effort good  -WM     Symptoms Noted During/After Treatment fatigue  -WM     Row Name 04/08/22 0839          Cognition    Affect/Mental Status (Cognition) WNL  -WM     Row Name 04/08/22 0839          Transfers    Sit-Stand [place] (Transfers) contact guard  -WM     Stand-Sit [place] (Transfers) contact guard  -WM     Row Name 04/08/22 0839          Sit-Stand Transfer    Assistive Device (Sit-Stand Transfers) walker, front-wheeled  -WM     Row Name 04/08/22 0839          Stand-Sit Transfer    Assistive Device (Stand-Sit Transfers) walker, front-wheeled  -WM     Row Name 04/08/22 0839          Gait/Stairs (Locomotion)    [place] Level (Gait) contact guard  -     Assistive Device (Gait) walker, front-wheeled  -     Distance in Feet (Gait) 4 forward/backward  -     Row Name 04/08/22 0839          Safety Issues, Functional Mobility    Impairments Affecting Function (Mobility) balance;endurance/activity tolerance;strength  -     Row Name 04/08/22 0839          Hip (Therapeutic Exercise)    Hip (Therapeutic Exercise) isometric exercises  -     Hip Isometrics (Therapeutic Exercise) bilateral;gluteal sets;10 repetitions;3 second hold;2 sets  -     Hip Strengthening (Therapeutic Exercise) bilateral;aBduction;aDduction;marching while seated;10 repetitions;2 sets  -     Row Name 04/08/22 0839          Knee (Therapeutic Exercise)    Knee (Therapeutic Exercise) isometric exercises  -     Knee Isometrics (Therapeutic Exercise) bilateral;quad sets;10 repetitions;3 second hold;2 sets  -     Knee Strengthening (Therapeutic Exercise) bilateral;LAQ (long arc quad);hamstring curls;sitting;10 repetitions;2 sets  -     Row Name 04/08/22 0839          Ankle (Therapeutic Exercise)    Ankle (Therapeutic Exercise) AROM (active range of motion)  -     Ankle AROM (Therapeutic Exercise) bilateral;dorsiflexion;plantarflexion;10 repetitions;2 sets  -     Row Name 04/08/22 0839          Progress Summary (PT)     Progress Toward Functional Goals (PT) progress toward functional goals is fair  -WM           User Key  (r) = Recorded By, (t) = Taken By, (c) = Cosigned By    Initials Name Provider Type    WM Walter Lee PTA Physical Therapist Assistant                Physical Therapy Education                 Title: PT OT SLP Therapies (In Progress)     Topic: Physical Therapy (Done)     Point: Mobility training (Done)     Learning Progress Summary           Patient Acceptance, E, VU by TK at 4/6/2022 1138                   Point: Home exercise program (Done)     Learning Progress Summary           Patient Acceptance, E, VU by TK at 4/6/2022 1138                   Point: Body mechanics (Done)     Learning Progress Summary           Patient Acceptance, E, VU by TK at 4/6/2022 1138                   Point: Precautions (Done)     Learning Progress Summary           Patient Acceptance, E, VU by TK at 4/6/2022 1138                               User Key     Initials Effective Dates Name Provider Type Discipline    TK 02/09/22 -  Alber Dickson, PT Student PT Student PT              PT Recommendation and Plan     Progress Summary (PT)  Progress Toward Functional Goals (PT): progress toward functional goals is fair   Outcome Measures     Row Name 04/08/22 0844 04/06/22 1100          How much help from another person do you currently need...    Turning from your back to your side while in flat bed without using bedrails? 3  -WM 3  -GILMA (r) TK (t) GILMA (c)     Moving from lying on back to sitting on the side of a flat bed without bedrails? 3  -WM 3  -GILMA (r) TK (t) GILMA (c)     Moving to and from a bed to a chair (including a wheelchair)? 3  -WM 3  -GILMA (r) TK (t) GILMA (c)     Standing up from a chair using your arms (e.g., wheelchair, bedside chair)? 3  -WM 3  -GILMA (r) TK (t) GILMA (c)     Climbing 3-5 steps with a railing? 2  -WM 2  -GILMA (r) TK (t) GILMA (c)     To walk in hospital room? 3  -WM 2  -GILMA (r) TK (t) GILMA (c)     AM-PAC 6 Clicks Score (PT)  17  -WM 16  -GILMA (r) TK (t)            Functional Assessment    Outcome Measure Options -- AM-PAC 6 Clicks Basic Mobility (PT)  -GILMA (r) TK (t) GILMA (c)           User Key  (r) = Recorded By, (t) = Taken By, (c) = Cosigned By    Initials Name Provider Type     Walter Lee PTA Physical Therapist Assistant    GILMAOctavio Baxter, PT Physical Therapist    TK Alber Dickson, PT Student PT Student                 Time Calculation:    PT Charges     Row Name 04/08/22 0837             Time Calculation    PT Received On 04/08/22  -WM              Timed Charges    07309 - PT Therapeutic Exercise Minutes 14  -WM      93549 - Gait Training Minutes  4  -WM      66494 - PT Therapeutic Activity Minutes 5  -WM              Total Minutes    Timed Charges Total Minutes 23  -WM       Total Minutes 23  -WM            User Key  (r) = Recorded By, (t) = Taken By, (c) = Cosigned By    Initials Name Provider Type     Walter Lee PTA Physical Therapist Assistant              Therapy Charges for Today     Code Description Service Date Service Provider Modifiers Qty    72651257058 HC PT THER PROC EA 15 MIN 4/8/2022 Walter Lee PTA GP 1    28525507027 HC PT THERAPEUTIC ACT EA 15 MIN 4/8/2022 Walter Lee PTA GP 1          PT G-Codes  Outcome Measure Options: AM-PAC 6 Clicks Daily Activity (OT), Optimal Instrument  AM-PAC 6 Clicks Score (PT): 17  AM-PAC 6 Clicks Score (OT): 16    Walter Lee PTA  4/8/2022

## 2022-04-08 NOTE — PLAN OF CARE
Goal Outcome Evaluation:  Pt has rested well, went to bed at approximately 11pm and was agreeable to wearing Bipap, tolerated Bipap throughout the night, continues to experience shortness of air with exertion, will continue to monitor

## 2022-04-08 NOTE — PROGRESS NOTES
[facility]   Hospitalist Progress Note  Date: 2022  Patient Name: Preston Wallis  : 1965  MRN: [id number]  Date of admission: 2022      Subjective   Subjective     Chief Complaint: follow up for shortness of breath    Summary: 57 y/o M with COPD, sleep apnea, noncompliance with CPAP/inhalers, CKD stage III, type II DM with complications who presented with worsening shortness of air.  Oxygen saturation 89% on 4 L.  CT of the chest showed central bronchiectasis with surrounding consolidation involving both lungs + pulmonary edema.  Covid positive, on remdesivir.  On broad-spectrum antibiotics, diuretics. Pulmonary on board.  Improving, oxygen requirements decreased    Interval Followup: Feeling better today.  No confusion.  Up in chair on 4 L/min nasal cannula O2 saturation 99%.  Reports improved shortness of air and less cough.  No chest pain or pressure.  No fever or chills.  No palpitations.  Reports mild wheezing but not bothering him significantly.  Getting up to chair independently with minimal FREITAS.  No issues with urination or constipation.  Have some swelling in his feet bilaterally.  No calf pain.  Tolerating oral intake, no nausea vomiting or abdominal pain.    Review of Systems  All other systems reviewed and negative unless stated above    Objective   Objective     Vitals:   Temp:  [97.2 °F (36.2 °C)-98.2 °F (36.8 °C)] 97.7 °F (36.5 °C)  Heart Rate:  [76-96] 76  Resp:  [18-22] 20  BP: (149-162)/(61-75) 161/64  Flow (L/min):  [3.5-4] 4  Physical Exam    Constitutional:  male, up in chair, conversant, pleasant, NAD   Eyes: Pupils equal and reactive, no conjunctival injection   HENT: NCAT, nares patent, MMM   Neck: Supple, trachea midline   Respiratory: Diminished aeration bilaterally with bilateral wheezing, nonlabored respiration   Cardiovascular: RRR, no murmurs, trace BLE    Gastrointestinal: Positive bowel sounds, soft, nontender, nondistended   Musculoskeletal: No gross  deformities, no clubbing or cyanosis to extremities   Neurologic: Alert and oriented x3, cranial Nerves grossly intact speech clear   Skin: Warm and dry, no rashes     Result Review    Result Review:  I have personally reviewed the results from the time of this admission to 4/8/2022 12:56 EDT and agree with these findings:  [x]  Laboratory  CBC    CBC 4/5/22 4/5/22 4/7/22 4/8/22    1529 2240     WBC 14.56 (A) 13.49 (A) 16.50 (A) 12.92 (A)   RBC 3.57 (A) 3.66 (A) 3.27 (A) 3.66 (A)   Hemoglobin 10.0 (A) 10.2 (A) 9.1 (A) 10.2 (A)   Hematocrit 32.7 (A) 33.4 (A) 28.7 (A) 32.2 (A)   MCV 91.6 91.3 87.8 88.0   MCH 28.0 27.9 27.8 27.9   MCHC 30.6 (A) 30.5 (A) 31.7 31.7   RDW 12.7 12.6 12.9 13.2   Platelets 340 346 383 392   (A) Abnormal value            BMP    BMP 4/5/22 4/5/22 4/7/22 4/8/22    1529 2240     BUN 40 (A) 43 (A) 58 (A) 59 (A)   Creatinine 1.82 (A) 2.05 (A) 2.30 (A) 2.21 (A)   Sodium 135 (A) 135 (A) 130 (A) 135 (A)   Potassium 4.4 5.4 (A) 4.9 4.5   Chloride 95 (A) 94 (A) 91 (A) 95 (A)   CO2 29.8 (A) 27.0 25.4 26.9   Calcium 9.4 9.2 9.0 9.4   (A) Abnormal value                [x]  Microbiology respiratory panel positive for Covid  MRSA PCR positive  Blood cultures NGTD  [x]  Radiology  [x]  EKG/Telemetry NSR, PVCs  []  Cardiology/Vascular   []  Pathology  []  Old records  [x]  Other:     Intake/Output Summary (Last 24 hours) at 4/8/2022 1256  Last data filed at 4/8/2022 0949  Gross per 24 hour   Intake 1200 ml   Output 5100 ml   Net -3900 ml         Assessment/Plan   Assessment / Plan     Assessment/Plan:  Active Hospital Problems    Diagnosis  POA   • **Acute on chronic respiratory failure with hypoxia (HCC) [J96.21]  Unknown   • Acute respiratory failure with hypercapnia (HCC) [J96.02]  Unknown   • Type 2 diabetes mellitus with hyperglycemia (HCC) [E11.65]  Unknown   • Therapeutic drug monitoring [Z51.81]  Not Applicable   • Obesity (BMI 30-39.9) [E66.9]  Unknown   • Chronic anticoagulation [Z79.01]  Not  Applicable   • Paroxysmal atrial fibrillation (HCC) [I48.0]  Yes   • Essential (primary) hypertension [I10]  Yes   • Multifocal pneumonia [J18.9]  Yes   • Acute hypoxemic respiratory failure due to COVID-19 ([facility]) [U07.1, J96.01]  Yes   • COPD with exacerbation (HCC) [J44.1]  Yes          Doing better from respiratory standpoint.  Continue to wean supplemental oxygen, SPO2 goal >90%. BiPAP 12/5 at night and as needed.   Liver enzymes normal.  Creatinine is elevated but EGFR greater than 30.  Okay to continue remdesivir. Day 3. Daily CMP to monitor creatinine and liver enzymes.  Check D-dimer, CRP in a.m.  Continue IV steroids per pulmonology recommendation  Continue Brovana and Pulmicort nebs twice daily.  DuoNebs every 6 hours.  Continue bronchopulmonary and bronchodilator protocol  Continue IV vancomycin and cefepime day 2.  Continue tobramycin nebs.  Sputum culture pending.  Cr 2-.2.2.  Almost net -4 L over the last 24 hours.  Continue IV Lasix to 40 mg daily.  May need to hold tomorrow depending on creatinine.   Remains in NSR.  Blood pressure well controlled.  Continue p.o. diltiazem  mg + Lopressor 100 mg twice daily. Continue Eliquis  Blood sugar still high.  Increase mealtime Humalog to 20 units three times a day. Continue Levemir 50 units q. nightly.   Continue moderate dose SSI  Continue baby aspirin and statin  Activity ad juanjo.  Up to chair daily  Continue enhanced airborne isolation    Discussed plan with staff.     DVT prophylaxis:  Medical DVT prophylaxis orders are present.    CODE STATUS:   Level Of Support Discussed With: Patient  Code Status (Patient has no pulse and is not breathing): CPR (Attempt to Resuscitate)  Medical Interventions (Patient has pulse or is breathing): Full Support    Electronically signed by Leonard Multani DO, 04/08/22, 12:56 PM EDT.

## 2022-04-08 NOTE — PROGRESS NOTES
"PHARMACY TO DOSE VANCOMYCIN DAY: 3  DURATION OF THERAPY: 4-13-22    INDICATION: PNEUMONIA  GOAL AUC: 400-600 MG/L.HR    HT: 175.3 cm (69\")      04/05/22 2151      Weight: 123 kg (270 lb 8.1 oz)        Estimated Creatinine Clearance: 48.4 mL/min (A) (by C-G formula based on SCr of 2.21 mg/dL (H)).  HD/CRRT/PD? NO  CONTRAST ADMINISTERED? NO  I/O last 3 completed shifts:  In: 1080 [P.O.:1080]  Out: 6550 [Urine:6550]    WBC: 12.92  TMAX: AF    Microbiology Results (last 10 days)       Procedure Component Value - Date/Time    Legionella Antigen, Urine - Urine, Urine, Clean Catch [[id number]]  (Normal) Collected: 04/06/22 1540    Lab Status: Final result Specimen: Urine, Clean Catch Updated: 04/06/22 1644     LEGIONELLA ANTIGEN, URINE Negative    S. Pneumo Ag Urine or CSF - Urine, Urine, Clean Catch [[id number]]  (Normal) Collected: 04/06/22 1540    Lab Status: Final result Specimen: Urine, Clean Catch Updated: 04/06/22 1645     Strep Pneumo Ag Negative    Respiratory Panel PCR w/COVID-19(SARS-CoV-2) [facility] In-House, NP Swab in UTM/VTM, 3-4 HR TAT - Swab, Nasopharynx [[id number]]  (Abnormal) Collected: 04/06/22 1102    Lab Status: Final result Specimen: Swab from Nasopharynx Updated: 04/06/22 1231     ADENOVIRUS, PCR Not Detected     Coronavirus 229E Not Detected     Coronavirus HKU1 Not Detected     Coronavirus NL63 Not Detected     Coronavirus OC43 Not Detected     COVID19 Detected     Human Metapneumovirus Not Detected     Human Rhinovirus/Enterovirus Not Detected     Influenza A PCR Not Detected     Influenza B PCR Not Detected     Parainfluenza Virus 1 Not Detected     Parainfluenza Virus 2 Not Detected     Parainfluenza Virus 3 Not Detected     Parainfluenza Virus 4 Not Detected     RSV, PCR Not Detected     Bordetella pertussis pcr Not Detected     Bordetella parapertussis PCR Not Detected     Chlamydophila pneumoniae PCR Not Detected     Mycoplasma pneumo by PCR Not Detected    Narrative:      " In the setting of a positive respiratory panel with a viral infection PLUS a negative procalcitonin without other underlying concern for bacterial infection, consider observing off antibiotics or discontinuation of antibiotics and continue supportive care. If the respiratory panel is positive for atypical bacterial infection (Bordetella pertussis, Chlamydophila pneumoniae, or Mycoplasma pneumoniae), consider antibiotic de-escalation to target atypical bacterial infection.    MRSA Screen, PCR (Inpatient) - Swab, Nares [[id number]]  (Abnormal) Collected: 04/06/22 1102    Lab Status: Final result Specimen: Swab from Nares Updated: 04/06/22 1423     MRSA PCR MRSA Detected    Mycoplasma Pneumoniae Antibody, IgM - Blood, [[id number]]  (Normal) Collected: 04/05/22 2230    Lab Status: Final result Specimen: Blood Updated: 04/06/22 1109     Mycoplasma pneumo IgM Negative    COVID-19,APTIMA PANTHER(DEBRA),[facility], NP/OP SWAB IN UTM/VTM/SALINE TRANSPORT MEDIA,24 HR TAT - Swab, Nasopharynx [[id number]]  (Normal) Collected: 04/05/22 1938    Lab Status: Final result Specimen: Swab from Nasopharynx Updated: 04/06/22 0552     COVID19 Not Detected    Narrative:      Fact sheet for providers: https://www.fda.gov/media/729235/download     Fact sheet for patients: https://www.fda.gov/media/341206/download    Test performed by RT PCR.    Blood Culture - Blood, Arm, Left [[id number]]  (Normal) Collected: 04/05/22 1933    Lab Status: Preliminary result Specimen: Blood from Arm, Left Updated: 04/07/22 1947     Blood Culture No growth at 2 days    Blood Culture - Blood, Arm, Left [[id number]]  (Normal) Collected: 04/05/22 1933    Lab Status: Preliminary result Specimen: Blood from Arm, Left Updated: 04/07/22 1947     Blood Culture No growth at 2 days          04/05/22 2032  CT Chest    Cardiomegaly with small bilateral pleural effusions, smooth interlobular septal thickening and ground-glass opacities likely representing pulmonary edema  pattern. Central bronchiectasis involving both lungs with surrounding consolidation likely representing superimposed infection. Enlarged right paratracheal lymph node, likely reactive to underlying pulmonary disease.     04/05/22 1549  XR Chest    Persistent right upper lobe airspace opacity representing focal infection or mass. Diffuse interstitial opacities within the left lung likely representing multifocal pneumonia. Probable small right-sided pleural effusion.    OTHER ANTIMICROBIAL THERAPY: Cefepime, remdesivir, tobramycin nebs    ASSESSMENT / PLAN:  Lab Results   Component Value Date    [facility] 29 (C) 01/30/2019    VANCORANDOM 12.45 04/07/2022     Current regimen is vancomycin 1000 mg q24hr. Per [facility], this regimen should provide:    AUC24,ss: 509 mg/L.hr  PAUC*: 78 %  Ctrough,ss: 16.0 mg/L  Pconc*: 30 %  Tox.: 11 %    Will continue same dose for now.

## 2022-04-08 NOTE — PROGRESS NOTES
Pulmonary / Critical Care Progress Note      Patient Name: Preston Wallis  : 1965  MRN: [id number]  Attending:  Leonard Multani DO  Date of admission: 2022    Subjective   Subjective   Follow-up for acute on chronic hypoxic respiratory failure secondary to COVID-19.    Over past 24 hours, has been weaned to 4 L nasal cannula.  Was started on steroids and remdesivir for diagnosis of COVID-19.  Continues on antibiotics and nebulizers.    No acute events overnight.    This morning,  Sitting up in chair, 2L NC with O2 sats 96-94%  Awake, alert and answering questions  Feels better  Diuresing well  -1 L fluid balance  Continues with 1-2+ pitting edema  Nonproductive cough  No chest pain  No fever or chills  Would like more education on Covid Vaccines    Review of Systems  General: Fatigue, otherwise denied complaints  Cardiovascular: Orthopnea, leg swelling, otherwise denied complaints  Respiratory: Dyspnea, cough, otherwise denied complaints  Gastrointestinal: Denied complaints  Musculoskeletal: Weakness, otherwise denied complaints    Objective   Objective     Vitals:   Temp:  [97.3 °F (36.3 °C)-98.2 °F (36.8 °C)] 98.2 °F (36.8 °C)  Heart Rate:  [72-96] 80  Resp:  [18-22] 20  BP: (113-162)/(49-72) 162/72  Flow (L/min):  [3.5-4] 4    Physical Exam   Vital Signs Reviewed   General: Obese male, awake and alert, NAD on NC O2 at 2L   HEENT:  Dentition poor, Mallampati 4/4 PERRL, EOMI.  OP, nares clear  Chest: Unlabored respirations, wheezing noted throughout all lung fields  CV: NSR, no MGR, pulses 2+, equal  Abd:  Obese, Soft, NT, ND, + BS, no HSM  EXT:  no clubbing, no cyanosis, 1-2+ BLE edema  Neuro:  A&Ox3, CN grossly intact, no focal deficits  Skin: No rashes or lesions noted    Result Review    Result Review:  I have personally reviewed the results from the time of this admission to 2022 06:52 EDT and agree with these findings:  [x]  Laboratory  [x]  Microbiology  [x]  Radiology  [x]  EKG/Telemetry   []   Cardiology/Vascular   []  Pathology  []  Old records  []  Other:  Most notable findings include: Blood sugars low to high 200's, 135 sodium, Cr 2.21  Yesterday,   CRP trending down 16.7 --> 9.73  D-dimer 1.5  Ferritin 146    4/7 0630 ABG --> 7.25, 65.1, 86.1, 28.5 on 6 L nasal cannula    4/6 Covid PCR positive  Covid rapid swab negative  Strep and Legionella negative  MRSA PCR positive    Assessment/Plan   Assessment / Plan     Active Hospital Problems:  Active Hospital Problems    Diagnosis    • **Acute on chronic respiratory failure with hypoxia (HCC)    • Acute respiratory failure with hypercapnia (HCC)    • Type 2 diabetes mellitus with hyperglycemia ([facility])    • Therapeutic drug monitoring    • Obesity (BMI 30-39.9)    • Chronic anticoagulation    • Paroxysmal atrial fibrillation (HCC)    • Essential (primary) hypertension    • Multifocal pneumonia    • Acute hypoxemic respiratory failure due to COVID-19 ([facility])    • COPD with exacerbation ([facility])      Impression:    1. MRSA pneumonia recurrent  history of Covid pneumonia 10/2021  Pseudomonas pneumonia 2019 and 2020  MRSA pneumonia 1/2019     2. Covid: positive 4/6/22, re-infection from 10/2021, unvaccinated      --> elevated inflammatory markers     3.  Acute exacerbation of heart failure: proBNP 1966, unknown EF  CT with small bilateral pleural effusions and pulmonary edema pattern     4. Acute exacerbation bronchiectasis: ? smart vest in the past     5. Acute exacerbation COPD: clinically severe, FEV1 of 21% in 2008, 24-pack-year smoking history   a) oxygen dependent   b) chronic bronchitis   c) bronchiectasis   d) recurrent pneumonia     6. Acute on chronic hypoxic respiratory failure: Secondary to above     7. ERIC on chronic kidney disease: Baseline creatinine 1.5, diabetic nephropathy     8. MILADY: PSG unavailable, noncompliant with BiPAP, 2019 overnight oximetry with 100 minutes of desaturations low was 81%     9. Paroxysmal A. Fib: On chronic anticoagulation  Eliquis     10. Pulmonary embolism: 2019 on Eliquis     11. NIDDM: 3/22 A1c 8.3     12.  Obesity: BMI 40     13.  Medical noncompliance    Plan:    Vanco # 3  Cefepime  # 3  Jt Nebs # 3    transitioned off of NIPPV to NC and very comfortable with NC  Continue to wean O2 to keep sats 88-92%.    Continue aggressive airway clearance with MetaNeb.  Continue Pulmicort, Brovana, and DuoNebs.  Continue bronchopulmonary hygiene. Encourage I-S and flutter valve.  Continue Mucinex     COVID-19 PCR positive.  Continue Remdesivir, day 3 of 5.  Not a candidate for Actemra at this time due to MRSA pneumonia.  Continue Solu-Medrol 40 mg IV twice daily.  Continue to trend inflammatory markers every 48 hours.     Continue Lasix 40 mg IV twice daily.  Trend renal panel and electrolytes.  Pending echocardiogram.     Continue Eliquis.     Pending alpha 1 antitrypsin and phenotype.     Encourage activity.  Up to chair as tolerated.  PT/OT on board.     Speech therapy on board and appreciate recommendations.  No overt signs of aspiration noted.  Regular diet with thin liquids.     RT  assist patient in getting nasal pillows for home BiPAP machine, chest vest, and arrange for pulmonary rehab.     Not up-to-date on vaccines: No Covid/pneumonia/flu vaccines obtained     Would benefit from outpatient pulmonary rehab.  Will need outpatient PFTs.  Will need to reestablish care in our office.    DVT prophylaxis:  Medical DVT prophylaxis orders are present.    CODE STATUS:   Level Of Support Discussed With: Patient  Code Status (Patient has no pulse and is not breathing): CPR (Attempt to Resuscitate)  Medical Interventions (Patient has pulse or is breathing): Full Support    Labs, microbiology, radiology, medications, and provider notes personally reviewed.  Discussed with primary services and bedside RN.  Remains ill with multiple medical problems/ guarded status/30 minutes

## 2022-04-09 ENCOUNTER — APPOINTMENT (OUTPATIENT)
Dept: CARDIOLOGY | Facility: HOSPITAL | Age: 57
End: 2022-04-09

## 2022-04-09 LAB
ALBUMIN SERPL-MCNC: 3.5 G/DL (ref 3.5–5.2)
ALBUMIN/GLOB SERPL: 0.9 G/DL
ALP SERPL-CCNC: 154 U/L (ref 39–117)
ALT SERPL W P-5'-P-CCNC: 17 U/L (ref 1–41)
ANION GAP SERPL CALCULATED.3IONS-SCNC: 9.6 MMOL/L (ref 5–15)
AST SERPL-CCNC: 18 U/L (ref 1–40)
BILIRUB SERPL-MCNC: 0.2 MG/DL (ref 0–1.2)
BUN SERPL-MCNC: 57 MG/DL (ref 6–20)
BUN/CREAT SERPL: 30.3 (ref 7–25)
CALCIUM SPEC-SCNC: 9.1 MG/DL (ref 8.6–10.5)
CHLAMYDIA IGG SER-ACNC: <0.91 RATIO (ref 0–0.9)
CHLORIDE SERPL-SCNC: 93 MMOL/L (ref 98–107)
CO2 SERPL-SCNC: 27.4 MMOL/L (ref 22–29)
CREAT SERPL-MCNC: 1.88 MG/DL (ref 0.76–1.27)
CRP SERPL-MCNC: 3.34 MG/DL (ref 0–0.5)
D DIMER PPP FEU-MCNC: 1.69 MG/L (FEU) (ref 0–0.59)
DEPRECATED RDW RBC AUTO: 40.4 FL (ref 37–54)
EGFRCR SERPLBLD CKD-EPI 2021: 41.4 ML/MIN/1.73
ERYTHROCYTE [DISTWIDTH] IN BLOOD BY AUTOMATED COUNT: 12.7 % (ref 12.3–15.4)
GLOBULIN UR ELPH-MCNC: 4 GM/DL
GLUCOSE BLDC GLUCOMTR-MCNC: 181 MG/DL (ref 70–99)
GLUCOSE BLDC GLUCOMTR-MCNC: 247 MG/DL (ref 70–99)
GLUCOSE BLDC GLUCOMTR-MCNC: 259 MG/DL (ref 70–99)
GLUCOSE BLDC GLUCOMTR-MCNC: 294 MG/DL (ref 70–99)
GLUCOSE SERPL-MCNC: 310 MG/DL (ref 65–99)
HADV AB TITR SER CF: NEGATIVE {TITER}
HCT VFR BLD AUTO: 33.1 % (ref 37.5–51)
HGB BLD-MCNC: 10.5 G/DL (ref 13–17.7)
L PNEUMO AB SER IA-ACNC: 1.51 OD RATIO (ref 0–0.9)
M PNEUMO IGG SER IA-ACNC: 394 U/ML (ref 0–99)
M PNEUMO IGM SER IA-ACNC: <770 U/ML (ref 0–769)
MCH RBC QN AUTO: 27.8 PG (ref 26.6–33)
MCHC RBC AUTO-ENTMCNC: 31.7 G/DL (ref 31.5–35.7)
MCV RBC AUTO: 87.6 FL (ref 79–97)
PLATELET # BLD AUTO: 405 10*3/MM3 (ref 140–450)
PMV BLD AUTO: 9.3 FL (ref 6–12)
POTASSIUM SERPL-SCNC: 4.7 MMOL/L (ref 3.5–5.2)
PROT SERPL-MCNC: 7.5 G/DL (ref 6–8.5)
RBC # BLD AUTO: 3.78 10*6/MM3 (ref 4.14–5.8)
SODIUM SERPL-SCNC: 130 MMOL/L (ref 136–145)
WBC NRBC COR # BLD: 13.33 10*3/MM3 (ref 3.4–10.8)

## 2022-04-09 PROCEDURE — 94761 N-INVAS EAR/PLS OXIMETRY MLT: CPT

## 2022-04-09 PROCEDURE — 63710000001 INSULIN DETEMIR PER 5 UNITS: Performed by: INTERNAL MEDICINE

## 2022-04-09 PROCEDURE — 25010000002 CEFEPIME PER 500 MG: Performed by: NURSE PRACTITIONER

## 2022-04-09 PROCEDURE — 25010000002 VANCOMYCIN 5 G RECONSTITUTED SOLUTION: Performed by: NURSE PRACTITIONER

## 2022-04-09 PROCEDURE — 94664 DEMO&/EVAL PT USE INHALER: CPT

## 2022-04-09 PROCEDURE — 94660 CPAP INITIATION&MGMT: CPT

## 2022-04-09 PROCEDURE — 94799 UNLISTED PULMONARY SVC/PX: CPT

## 2022-04-09 PROCEDURE — 63710000001 INSULIN LISPRO (HUMAN) PER 5 UNITS: Performed by: INTERNAL MEDICINE

## 2022-04-09 PROCEDURE — 85027 COMPLETE CBC AUTOMATED: CPT | Performed by: INTERNAL MEDICINE

## 2022-04-09 PROCEDURE — 63710000001 INSULIN LISPRO (HUMAN) PER 5 UNITS: Performed by: HOSPITALIST

## 2022-04-09 PROCEDURE — 99233 SBSQ HOSP IP/OBS HIGH 50: CPT | Performed by: INTERNAL MEDICINE

## 2022-04-09 PROCEDURE — 93308 TTE F-UP OR LMTD: CPT

## 2022-04-09 PROCEDURE — 25010000002 METHYLPREDNISOLONE PER 40 MG: Performed by: HOSPITALIST

## 2022-04-09 PROCEDURE — 86140 C-REACTIVE PROTEIN: CPT | Performed by: INTERNAL MEDICINE

## 2022-04-09 PROCEDURE — 25010000002 FUROSEMIDE PER 20 MG: Performed by: INTERNAL MEDICINE

## 2022-04-09 PROCEDURE — 80053 COMPREHEN METABOLIC PANEL: CPT | Performed by: INTERNAL MEDICINE

## 2022-04-09 PROCEDURE — 82962 GLUCOSE BLOOD TEST: CPT

## 2022-04-09 PROCEDURE — 85379 FIBRIN DEGRADATION QUANT: CPT | Performed by: INTERNAL MEDICINE

## 2022-04-09 PROCEDURE — 93321 DOPPLER ECHO F-UP/LMTD STD: CPT

## 2022-04-09 PROCEDURE — 25010000002 METHYLPREDNISOLONE PER 40 MG: Performed by: NURSE PRACTITIONER

## 2022-04-09 PROCEDURE — 25010000002 SULFUR HEXAFLUORIDE MICROSPH 60.7-25 MG RECONSTITUTED SUSPENSION: Performed by: INTERNAL MEDICINE

## 2022-04-09 PROCEDURE — 25010000002 REMDESIVIR 100 MG/20ML SOLUTION 1 EACH VIAL: Performed by: NURSE PRACTITIONER

## 2022-04-09 PROCEDURE — 93325 DOPPLER ECHO COLOR FLOW MAPG: CPT

## 2022-04-09 RX ORDER — METHYLPREDNISOLONE SODIUM SUCCINATE 40 MG/ML
20 INJECTION, POWDER, LYOPHILIZED, FOR SOLUTION INTRAMUSCULAR; INTRAVENOUS EVERY 12 HOURS
Status: DISCONTINUED | OUTPATIENT
Start: 2022-04-09 | End: 2022-04-11 | Stop reason: HOSPADM

## 2022-04-09 RX ADMIN — CEFEPIME 2 G: 1 INJECTION, SOLUTION INTRAVENOUS at 21:03

## 2022-04-09 RX ADMIN — METOPROLOL TARTRATE 100 MG: 50 TABLET, FILM COATED ORAL at 21:04

## 2022-04-09 RX ADMIN — IPRATROPIUM BROMIDE AND ALBUTEROL SULFATE 3 ML: 2.5; .5 SOLUTION RESPIRATORY (INHALATION) at 18:25

## 2022-04-09 RX ADMIN — GABAPENTIN 300 MG: 300 CAPSULE ORAL at 21:04

## 2022-04-09 RX ADMIN — DULOXETINE HYDROCHLORIDE 60 MG: 30 CAPSULE, DELAYED RELEASE ORAL at 21:04

## 2022-04-09 RX ADMIN — GUAIFENESIN 1200 MG: 600 TABLET ORAL at 09:16

## 2022-04-09 RX ADMIN — ARFORMOTEROL TARTRATE 15 MCG: 15 SOLUTION RESPIRATORY (INHALATION) at 18:26

## 2022-04-09 RX ADMIN — INSULIN LISPRO 25 UNITS: 100 INJECTION, SOLUTION INTRAVENOUS; SUBCUTANEOUS at 13:22

## 2022-04-09 RX ADMIN — METOPROLOL TARTRATE 100 MG: 50 TABLET, FILM COATED ORAL at 09:16

## 2022-04-09 RX ADMIN — BUDESONIDE 0.5 MG: 0.5 SUSPENSION RESPIRATORY (INHALATION) at 06:37

## 2022-04-09 RX ADMIN — APIXABAN 5 MG: 5 TABLET, FILM COATED ORAL at 21:04

## 2022-04-09 RX ADMIN — METHYLPREDNISOLONE SODIUM SUCCINATE 20 MG: 40 INJECTION, POWDER, FOR SOLUTION INTRAMUSCULAR; INTRAVENOUS at 18:05

## 2022-04-09 RX ADMIN — IPRATROPIUM BROMIDE AND ALBUTEROL SULFATE 3 ML: 2.5; .5 SOLUTION RESPIRATORY (INHALATION) at 06:37

## 2022-04-09 RX ADMIN — METHYLPREDNISOLONE SODIUM SUCCINATE 40 MG: 40 INJECTION, POWDER, FOR SOLUTION INTRAMUSCULAR; INTRAVENOUS at 04:32

## 2022-04-09 RX ADMIN — INSULIN LISPRO 2 UNITS: 100 INJECTION, SOLUTION INTRAVENOUS; SUBCUTANEOUS at 21:03

## 2022-04-09 RX ADMIN — ARFORMOTEROL TARTRATE 15 MCG: 15 SOLUTION RESPIRATORY (INHALATION) at 06:37

## 2022-04-09 RX ADMIN — INSULIN LISPRO 6 UNITS: 100 INJECTION, SOLUTION INTRAVENOUS; SUBCUTANEOUS at 09:15

## 2022-04-09 RX ADMIN — INSULIN LISPRO 25 UNITS: 100 INJECTION, SOLUTION INTRAVENOUS; SUBCUTANEOUS at 09:16

## 2022-04-09 RX ADMIN — ASPIRIN 81 MG: 81 TABLET, COATED ORAL at 09:16

## 2022-04-09 RX ADMIN — INSULIN LISPRO 6 UNITS: 100 INJECTION, SOLUTION INTRAVENOUS; SUBCUTANEOUS at 13:22

## 2022-04-09 RX ADMIN — VANCOMYCIN HYDROCHLORIDE 1000 MG: 5 INJECTION, POWDER, LYOPHILIZED, FOR SOLUTION INTRAVENOUS at 13:22

## 2022-04-09 RX ADMIN — DULOXETINE HYDROCHLORIDE 60 MG: 30 CAPSULE, DELAYED RELEASE ORAL at 09:16

## 2022-04-09 RX ADMIN — REMDESIVIR 100 MG: 100 INJECTION, POWDER, LYOPHILIZED, FOR SOLUTION INTRAVENOUS at 13:35

## 2022-04-09 RX ADMIN — Medication 10 ML: at 21:05

## 2022-04-09 RX ADMIN — TOBRAMYCIN 300 MG: 300 SOLUTION RESPIRATORY (INHALATION) at 00:07

## 2022-04-09 RX ADMIN — INSULIN DETEMIR 15 UNITS: 100 INJECTION, SOLUTION SUBCUTANEOUS at 10:11

## 2022-04-09 RX ADMIN — FAMOTIDINE 20 MG: 20 TABLET ORAL at 09:16

## 2022-04-09 RX ADMIN — HYDRALAZINE HYDROCHLORIDE 25 MG: 25 TABLET, FILM COATED ORAL at 09:16

## 2022-04-09 RX ADMIN — FERROUS SULFATE TAB 325 MG (65 MG ELEMENTAL FE) 325 MG: 325 (65 FE) TAB at 09:16

## 2022-04-09 RX ADMIN — TOBRAMYCIN 300 MG: 300 SOLUTION RESPIRATORY (INHALATION) at 23:44

## 2022-04-09 RX ADMIN — CEFEPIME 2 G: 1 INJECTION, SOLUTION INTRAVENOUS at 09:19

## 2022-04-09 RX ADMIN — TOBRAMYCIN 300 MG: 300 SOLUTION RESPIRATORY (INHALATION) at 11:43

## 2022-04-09 RX ADMIN — IPRATROPIUM BROMIDE AND ALBUTEROL SULFATE 3 ML: 2.5; .5 SOLUTION RESPIRATORY (INHALATION) at 00:07

## 2022-04-09 RX ADMIN — DILTIAZEM HYDROCHLORIDE 120 MG: 120 CAPSULE, COATED, EXTENDED RELEASE ORAL at 09:17

## 2022-04-09 RX ADMIN — GUAIFENESIN 1200 MG: 600 TABLET ORAL at 21:04

## 2022-04-09 RX ADMIN — Medication 10 ML: at 09:17

## 2022-04-09 RX ADMIN — INSULIN DETEMIR 50 UNITS: 100 INJECTION, SOLUTION SUBCUTANEOUS at 21:03

## 2022-04-09 RX ADMIN — INSULIN LISPRO 4 UNITS: 100 INJECTION, SOLUTION INTRAVENOUS; SUBCUTANEOUS at 18:06

## 2022-04-09 RX ADMIN — SULFUR HEXAFLUORIDE 2 ML: KIT at 16:41

## 2022-04-09 RX ADMIN — FAMOTIDINE 20 MG: 20 TABLET ORAL at 18:10

## 2022-04-09 RX ADMIN — APIXABAN 5 MG: 5 TABLET, FILM COATED ORAL at 09:16

## 2022-04-09 RX ADMIN — IPRATROPIUM BROMIDE AND ALBUTEROL SULFATE 3 ML: 2.5; .5 SOLUTION RESPIRATORY (INHALATION) at 11:43

## 2022-04-09 RX ADMIN — INSULIN LISPRO 25 UNITS: 100 INJECTION, SOLUTION INTRAVENOUS; SUBCUTANEOUS at 18:05

## 2022-04-09 RX ADMIN — HYDRALAZINE HYDROCHLORIDE 25 MG: 25 TABLET, FILM COATED ORAL at 21:04

## 2022-04-09 RX ADMIN — FUROSEMIDE 40 MG: 10 INJECTION, SOLUTION INTRAMUSCULAR; INTRAVENOUS at 09:16

## 2022-04-09 RX ADMIN — TRAZODONE HYDROCHLORIDE 100 MG: 100 TABLET ORAL at 21:04

## 2022-04-09 RX ADMIN — ATORVASTATIN CALCIUM 20 MG: 20 TABLET, FILM COATED ORAL at 21:04

## 2022-04-09 RX ADMIN — TAMSULOSIN HYDROCHLORIDE 0.4 MG: 0.4 CAPSULE ORAL at 18:10

## 2022-04-09 RX ADMIN — BUDESONIDE 0.5 MG: 0.5 SUSPENSION RESPIRATORY (INHALATION) at 18:26

## 2022-04-09 NOTE — PROGRESS NOTES
[facility]   Hospitalist Progress Note  Date: 2022  Patient Name: Preston Wallis  : 1965  MRN: [id number]  Date of admission: 2022      Subjective   Subjective     Chief Complaint: follow up for shortness of breath    Summary: 55 y/o M with COPD, sleep apnea, noncompliance with CPAP/inhalers, CKD stage III, type II DM with complications who presented with worsening shortness of air.  Oxygen saturation 89% on 4 L.  CT of the chest showed central bronchiectasis with surrounding consolidation involving both lungs + pulmonary edema.  Covid positive, on remdesivir.  On broad-spectrum antibiotics, diuretics. Pulmonary on board.  Improving, oxygen requirements decreased    Interval Followup: NAEON.  Compliant with NIPPV overnight.  Feeling a lot better.  Up in chair.  Weaned to room air.  Denies worsening shortness of air or productive cough.  No chest pain or pressure.  No fever or chills.  Continues to complain of swelling in the feet as well as neuropathy.  Neuropathy is chronic.  Tolerating oral intake.  No N/V, abdominal pain.  No issues with urination or constipation.    Review of Systems  All other systems reviewed and negative unless stated above    Objective   Objective     Vitals:   Temp:  [97.9 °F (36.6 °C)-98.4 °F (36.9 °C)] 97.9 °F (36.6 °C)  Heart Rate:  [65-82] 76  Resp:  [16-20] 20  BP: (144-166)/(57-65) 158/57  Flow (L/min):  [1-2] 1  Physical Exam    Constitutional:  male, up in chair, conversant, pleasant, NAD   Eyes: Pupils equal and reactive, no conjunctival injection   HENT: NCAT, nares patent, MMM   Neck: Supple, trachea midline   Respiratory: Improved aeration, bilateral wheezing, nonlabored respiration   Cardiovascular: RRR, no murmurs, trace BLE    Gastrointestinal: Positive bowel sounds, soft, nontender, nondistended   Musculoskeletal: No gross deformities, no clubbing or cyanosis to extremities   Neurologic: Alert and oriented x3, cranial Nerves grossly intact  speech clear   Skin: Warm and dry, no rashes     Result Review    Result Review:  I have personally reviewed the results from the time of this admission to 4/9/2022 12:16 EDT and agree with these findings:  [x]  Laboratory  CBC    CBC 4/7/22 4/8/22 4/9/22   WBC 16.50 (A) 12.92 (A) 13.33 (A)   RBC 3.27 (A) 3.66 (A) 3.78 (A)   Hemoglobin 9.1 (A) 10.2 (A) 10.5 (A)   Hematocrit 28.7 (A) 32.2 (A) 33.1 (A)   MCV 87.8 88.0 87.6   MCH 27.8 27.9 27.8   MCHC 31.7 31.7 31.7   RDW 12.9 13.2 12.7   Platelets 383 392 405   (A) Abnormal value            BMP    BMP 4/7/22 4/8/22 4/9/22   BUN 58 (A) 59 (A) 57 (A)   Creatinine 2.30 (A) 2.21 (A) 1.88 (A)   Sodium 130 (A) 135 (A) 130 (A)   Potassium 4.9 4.5 4.7   Chloride 91 (A) 95 (A) 93 (A)   CO2 25.4 26.9 27.4   Calcium 9.0 9.4 9.1   (A) Abnormal value                [x]  Microbiology respiratory panel positive for Covid  MRSA PCR positive  Blood cultures NGTD  [x]  Radiology  [x]  EKG/Telemetry NSR, PVCs  []  Cardiology/Vascular   []  Pathology  []  Old records  [x]  Other:     Intake/Output Summary (Last 24 hours) at 4/9/2022 1216  Last data filed at 4/9/2022 1120  Gross per 24 hour   Intake 2260 ml   Output 4070 ml   Net -1810 ml         Assessment/Plan   Assessment / Plan     Assessment/Plan:  Active Hospital Problems    Diagnosis  POA   • **Acute on chronic respiratory failure with hypoxia (HCC) [J96.21]  Unknown   • Acute respiratory failure with hypercapnia (HCC) [J96.02]  Unknown   • Type 2 diabetes mellitus with hyperglycemia (HCC) [E11.65]  Unknown   • Therapeutic drug monitoring [Z51.81]  Not Applicable   • Obesity (BMI 30-39.9) [E66.9]  Unknown   • Chronic anticoagulation [Z79.01]  Not Applicable   • Paroxysmal atrial fibrillation (HCC) [I48.0]  Yes   • Essential (primary) hypertension [I10]  Yes   • Multifocal pneumonia [J18.9]  Yes   • Acute hypoxemic respiratory failure due to COVID-19 (HCC) [U07.1, J96.01]  Yes   • COPD with exacerbation (HCC) [J44.1]  Yes           Continues to improve clinically.  Weaned to room air this AM.  CRP improved.  D-dimer not significantly elevated compared to prior value.  Liver enzymes normal. GFR greater than 30. Continue remdesivir, day 4/5   ->Daily CMP to monitor creatinine and liver enzymes  Pulmonology following; IV steroid regimen decreased today  Continue scheduled Brovana/Pulmicort/DuoNeb's. continue bronchopulmonary/bronchodilator protocol  Unable to get sputum cx.  Continue IV vancomycin and cefepime day 2.  Continue tobramycin nebs.    Cr improved. Continue IV Lasix to 40 mg daily.  Strict I's and O's.  Trend renal function and electrolytes  Continue BiPAP 12/5 at night and as needed.   Remains in NSR.  Blood pressure controlled.  Continue p.o. diltiazem  mg + Lopressor 100 mg twice daily. Continue Eliquis  Blood sugar not at goal.  Increase mealtime Humalog to 25 units three times a day.  Add Levemir 15 units daily.  Continue Levemir 50 units q. nightly.   Continue moderate dose SSI  Continue baby aspirin and statin  Activity ad juanjo.  Up to chair daily  Continue enhanced airborne isolation    Discussed plan with staff.     DVT prophylaxis:  Medical DVT prophylaxis orders are present.    CODE STATUS:   Level Of Support Discussed With: Patient  Code Status (Patient has no pulse and is not breathing): CPR (Attempt to Resuscitate)  Medical Interventions (Patient has pulse or is breathing): Full Support    Electronically signed by Leonard Multani DO, 04/09/22, 12:16 PM EDT.

## 2022-04-09 NOTE — PLAN OF CARE
Goal Outcome Evaluation:              Outcome Evaluation: patient still on one liter NC. blood sugars controlled through out shift per sliding scale. antibiotics still being given. no other changes noted. vss. will continue to monitor.

## 2022-04-09 NOTE — PROGRESS NOTES
Pulmonary / Critical Care Progress Note      Patient Name: Preston Wallis  : 1965  MRN: [id number]  Attending:  Leonard Multani DO  Date of admission: 2022    Subjective   Subjective   Follow-up for acute on chronic hypoxic respiratory failure secondary to COVID-19.    Over past 24 hours, has been weaned to 1 L nasal cannula.  Was started on steroids and remdesivir for diagnosis of COVID-19.  Continues on antibiotics and nebulizers.    No acute events overnight.    This morning,  Sitting up in chair, on room air with O2 sats 93-94%  Awake, alert and answering questions  Feels better  Continues to diurese well  Continues with 1-2+ pitting edema in his lower extremities  Nonproductive cough  Blood sugars in the 300s overnight  No chest pain  No fever or chills  Would like more education on Covid Vaccines    Review of Systems  General: Fatigue, otherwise denied complaints  Cardiovascular: Orthopnea, leg swelling, otherwise denied complaints  Respiratory: Dyspnea, cough, otherwise denied complaints  Gastrointestinal: Denied complaints  Musculoskeletal: Weakness, otherwise denied complaints    Objective   Objective     Vitals:   Temp:  [97.7 °F (36.5 °C)-98.4 °F (36.9 °C)] 97.9 °F (36.6 °C)  Heart Rate:  [65-82] 72  Resp:  [16-20] 20  BP: (144-166)/(57-65) 158/57  Flow (L/min):  [1-2] 2    Physical Exam   Vital Signs Reviewed   General: Obese male, awake and alert, NAD on room air   HEENT:  Dentition poor, Mallampati 4/4 PERRL, EOMI.  OP, nares clear  Chest: Unlabored respirations, wheezing noted throughout all lung fields, speaking full sentences  CV: NSR, no MGR, pulses 2+, equal  Abd:  Obese, Soft, NT, ND, + BS, no HSM  EXT:  no clubbing, no cyanosis, 1-2+ BLE edema, endorsing numbness and tingling to bilateral legs  Neuro:  A&Ox3, CN grossly intact, no focal deficits  Skin: No rashes or lesions noted    Result Review    Result Review:  I have personally reviewed the results from the time of this admission  to 4/9/2022 09:48 EDT and agree with these findings:  [x]  Laboratory  [x]  Microbiology  [x]  Radiology  [x]  EKG/Telemetry   []  Cardiology/Vascular   []  Pathology  []  Old records  []  Other:  Most notable findings include: Blood sugars up to 310 , 130 sodium, Cr improving 1.88    CRP trending down 16.7 --> 9.73 -> 3.34  D-dimer 1.69      4/7 0630 ABG --> 7.25, 65.1, 86.1, 28.5 on 6 L nasal cannula    4/6 Covid PCR positive  Covid rapid swab negative  Strep and Legionella negative  MRSA PCR positive    Assessment/Plan   Assessment / Plan     Active Hospital Problems:  Active Hospital Problems    Diagnosis    • **Acute on chronic respiratory failure with hypoxia (HCC)    • Acute respiratory failure with hypercapnia ([facility])    • Type 2 diabetes mellitus with hyperglycemia ([facility])    • Therapeutic drug monitoring    • Obesity (BMI 30-39.9)    • Chronic anticoagulation    • Paroxysmal atrial fibrillation ([facility])    • Essential (primary) hypertension    • Multifocal pneumonia    • Acute hypoxemic respiratory failure due to COVID-19 ([facility])    • COPD with exacerbation ([facility])      Impression:    1. MRSA pneumonia recurrent  history of Covid pneumonia 10/2021  Pseudomonas pneumonia 2019 and 2020  MRSA pneumonia 1/2019     2. Covid: positive 4/6/22, re-infection from 10/2021, unvaccinated      --> elevated inflammatory markers     3.  Acute exacerbation of heart failure: proBNP 1966, unknown EF  CT with small bilateral pleural effusions and pulmonary edema pattern     4. Acute exacerbation bronchiectasis: ? smart vest in the past     5. Acute exacerbation COPD: clinically severe, FEV1 of 21% in 2008, 24-pack-year smoking history   a) oxygen dependent   b) chronic bronchitis   c) bronchiectasis   d) recurrent pneumonia     6. Acute on chronic hypoxic respiratory failure: Secondary to above     7. ERIC on chronic kidney disease: Baseline creatinine 1.5, diabetic nephropathy     8. MILADY: PSG unavailable, noncompliant with BiPAP, 2019  overnight oximetry with 100 minutes of desaturations low was 81%     9. Paroxysmal A. Fib: On chronic anticoagulation Eliquis     10. Pulmonary embolism: 2019 on Eliquis     11. NIDDM: 3/22 A1c 8.3     12.  Obesity: BMI 40     13.  Medical noncompliance    Plan:    Vanco # 4  Cefepime  # 4  Jt Nebs # 4    transitioned off of NIPPV to NC and very comfortable with NC, now tolerating room air  Continue to wean O2 to keep sats 88-92%.    Continue aggressive airway clearance with MetaNeb.  Continue Pulmicort, Brovana, and DuoNebs.  Continue bronchopulmonary hygiene. Encourage I-S and flutter valve.  Continue Mucinex     COVID-19 PCR positive.  Continue Remdesivir, day 4 of 5.  Not a candidate for Actemra at this time due to MRSA pneumonia.  Continue Solu-Medrol, but will decrease from 40 mg IV twice daily to 20 mg twice daily  Continue to trend inflammatory markers every 48 hours,  Downtrending nicely     Continue Lasix 40 mg IV twice daily.  Trend renal panel and electrolytes.  Pending echocardiogram.     Continue Eliquis.     Pending alpha 1 antitrypsin and phenotype.     Encourage activity.  Up to chair as tolerated.  PT/OT on board.     Speech therapy on board and appreciate recommendations.  No overt signs of aspiration noted.  Regular diet with thin liquids.     RT  assist patient in getting nasal pillows for home BiPAP machine, chest vest, and arrange for pulmonary rehab.     Not up-to-date on vaccines: No Covid/pneumonia/flu vaccines obtained     Would benefit from outpatient pulmonary rehab.  Will need outpatient PFTs.  Will need to reestablish care in our office.    DVT prophylaxis:  Medical DVT prophylaxis orders are present.    CODE STATUS:   Level Of Support Discussed With: Patient  Code Status (Patient has no pulse and is not breathing): CPR (Attempt to Resuscitate)  Medical Interventions (Patient has pulse or is breathing): Full Support    Labs, microbiology, radiology, medications, and  provider notes personally reviewed.  Discussed with primary services and bedside RN.  Remains ill with multiple medical problems/ guarded status/30 minutes       [facility]-NP, am scribing for & in the presence of Dr. Lopes on 04/09/2022, who was present during rounds with me.    Part of this note may be an electronic transcription/translation of spoken language to printed text using the Dragon Dictation System.

## 2022-04-09 NOTE — PLAN OF CARE
Problem: Adult Inpatient Plan of Care  Goal: Patient-Specific Goal (Individualized)  4/9/2022 0330 by Yessica Santiago RN  Outcome: Ongoing, Progressing  4/9/2022 0330 by Yessica Santiago RN  Outcome: Ongoing, Progressing  Goal: Absence of Hospital-Acquired Illness or Injury  4/9/2022 0330 by Yessica Santiago RN  Outcome: Ongoing, Progressing  4/9/2022 0330 by Yessica Santiago RN  Outcome: Ongoing, Progressing  Intervention: Identify and Manage Fall Risk  Recent Flowsheet Documentation  Taken 4/9/2022 0050 by Yessica Santiago RN  Safety Promotion/Fall Prevention:   assistive device/personal items within reach   clutter free environment maintained   safety round/check completed   room organization consistent  Taken 4/8/2022 2101 by Yessica Santiago RN  Safety Promotion/Fall Prevention:   assistive device/personal items within reach   clutter free environment maintained   safety round/check completed   room organization consistent  Taken 4/8/2022 1920 by Yessica Santiago RN  Safety Promotion/Fall Prevention:   safety round/check completed   room organization consistent   assistive device/personal items within reach   clutter free environment maintained  Intervention: Prevent Infection  Recent Flowsheet Documentation  Taken 4/9/2022 0050 by Yessica Santiago RN  Infection Prevention:   single patient room provided   rest/sleep promoted   environmental surveillance performed   equipment surfaces disinfected  Taken 4/8/2022 2101 by Yessica Santiago RN  Infection Prevention:   single patient room provided   rest/sleep promoted   environmental surveillance performed   equipment surfaces disinfected  Taken 4/8/2022 1920 by Yessica Santiago RN  Infection Prevention:   single patient room provided   rest/sleep promoted   environmental surveillance performed   equipment surfaces disinfected  Goal: Optimal Comfort and Wellbeing  4/9/2022 0330 by Yessica Santiago RN  Outcome: Ongoing, Progressing  4/9/2022 0330 by Yessica Santiago RN  Outcome: Ongoing,  Progressing  Intervention: Provide Person-Centered Care  Recent Flowsheet Documentation  Taken 4/8/2022 1920 by Yessica Santiago RN  Trust Relationship/Rapport:   care explained   choices provided   emotional support provided   empathic listening provided   questions answered   questions encouraged   reassurance provided   thoughts/feelings acknowledged  Goal: Readiness for Transition of Care  4/9/2022 0330 by Yessica Santiago RN  Outcome: Ongoing, Progressing  4/9/2022 0330 by Yessica Santiago RN  Outcome: Ongoing, Progressing     Problem: Fall Injury Risk  Goal: Absence of Fall and Fall-Related Injury  4/9/2022 0330 by Yessica Santiago RN  Outcome: Ongoing, Progressing  4/9/2022 0330 by Yessica Santiago RN  Outcome: Ongoing, Progressing  Intervention: Identify and Manage Contributors  Recent Flowsheet Documentation  Taken 4/9/2022 0050 by Yessica Santiago RN  Medication Review/Management: medications reviewed  Taken 4/8/2022 2101 by Yessica Santiago RN  Medication Review/Management: medications reviewed  Taken 4/8/2022 1920 by Yessica Santiago RN  Medication Review/Management: medications reviewed  Intervention: Promote Injury-Free Environment  Recent Flowsheet Documentation  Taken 4/9/2022 0050 by Yessica Santiago RN  Safety Promotion/Fall Prevention:   assistive device/personal items within reach   clutter free environment maintained   safety round/check completed   room organization consistent  Taken 4/8/2022 2101 by Yessica Santiago RN  Safety Promotion/Fall Prevention:   assistive device/personal items within reach   clutter free environment maintained   safety round/check completed   room organization consistent  Taken 4/8/2022 1920 by Yessica Santiago RN  Safety Promotion/Fall Prevention:   safety round/check completed   room organization consistent   assistive device/personal items within reach   clutter free environment maintained     Problem: Skin Injury Risk Increased  Goal: Skin Health and Integrity  4/9/2022 0330 by Yessica Santiago RN  Outcome:  Ongoing, Progressing  4/9/2022 0330 by Yessica Santiago RN  Outcome: Ongoing, Progressing     Problem: Airway Clearance Ineffective  Goal: Effective Airway Clearance  4/9/2022 0330 by Yessica Santiago RN  Outcome: Ongoing, Progressing  4/9/2022 0330 by Yessica Santiago RN  Outcome: Ongoing, Progressing  Intervention: Promote Airway Secretion Clearance  Recent Flowsheet Documentation  Taken 4/8/2022 1920 by Yessica Santiago RN  Cough And Deep Breathing: done independently per patient   Goal Outcome Evaluation:  Plan of Care Reviewed With: patient        Progress: improving  Outcome Evaluation: patient down to one liter nasal canula and still on antibiotics. no other changes at this time will continue to monitor patient.

## 2022-04-09 NOTE — PROGRESS NOTES
"PHARMACY TO DOSE VANCOMYCIN DAY: 4  DURATION OF THERAPY: 4-13-22    INDICATION: PNEUMONIA  GOAL AUC: 400-600 MG/L.HR    HT: 175.3 cm (69\")      04/05/22 2151      Weight: 123 kg (270 lb 8.1 oz)        Estimated Creatinine Clearance: 56.8 mL/min (A) (by C-G formula based on SCr of 1.88 mg/dL (H)).  HD/CRRT/PD? NO  CONTRAST ADMINISTERED? NO  I/O last 3 completed shifts:  In: 3900 [P.O.:3800; IV Piggyback:100]  Out: 8170 [Urine:8170]    WBC: 13.33  TMAX: AF    Microbiology Results (last 10 days)       Procedure Component Value - Date/Time    Legionella Antigen, Urine - Urine, Urine, Clean Catch [[id number]]  (Normal) Collected: 04/06/22 1540    Lab Status: Final result Specimen: Urine, Clean Catch Updated: 04/06/22 1644     LEGIONELLA ANTIGEN, URINE Negative    S. Pneumo Ag Urine or CSF - Urine, Urine, Clean Catch [[id number]]  (Normal) Collected: 04/06/22 1540    Lab Status: Final result Specimen: Urine, Clean Catch Updated: 04/06/22 1645     Strep Pneumo Ag Negative    Respiratory Panel PCR w/COVID-19(SARS-CoV-2) OSMIN/ROBERTA/OCHOA/PAD/COR/MAD/LUIS In-House, NP Swab in UTM/VTM, 3-4 HR TAT - Swab, Nasopharynx [[id number]]  (Abnormal) Collected: 04/06/22 1102    Lab Status: Final result Specimen: Swab from Nasopharynx Updated: 04/06/22 1231     ADENOVIRUS, PCR Not Detected     Coronavirus 229E Not Detected     Coronavirus HKU1 Not Detected     Coronavirus NL63 Not Detected     Coronavirus OC43 Not Detected     COVID19 Detected     Human Metapneumovirus Not Detected     Human Rhinovirus/Enterovirus Not Detected     Influenza A PCR Not Detected     Influenza B PCR Not Detected     Parainfluenza Virus 1 Not Detected     Parainfluenza Virus 2 Not Detected     Parainfluenza Virus 3 Not Detected     Parainfluenza Virus 4 Not Detected     RSV, PCR Not Detected     Bordetella pertussis pcr Not Detected     Bordetella parapertussis PCR Not Detected     Chlamydophila pneumoniae PCR Not Detected     Mycoplasma pneumo by PCR Not Detected    " Narrative:      In the setting of a positive respiratory panel with a viral infection PLUS a negative procalcitonin without other underlying concern for bacterial infection, consider observing off antibiotics or discontinuation of antibiotics and continue supportive care. If the respiratory panel is positive for atypical bacterial infection (Bordetella pertussis, Chlamydophila pneumoniae, or Mycoplasma pneumoniae), consider antibiotic de-escalation to target atypical bacterial infection.    MRSA Screen, PCR (Inpatient) - Swab, Nares [[id number]]  (Abnormal) Collected: 04/06/22 1102    Lab Status: Final result Specimen: Swab from Nares Updated: 04/06/22 1423     MRSA PCR MRSA Detected    Mycoplasma Pneumoniae Antibody, IgM - Blood, [[id number]]  (Normal) Collected: 04/05/22 2230    Lab Status: Final result Specimen: Blood Updated: 04/06/22 1109     Mycoplasma pneumo IgM Negative    COVID-19,APTIMA PANTHER(DEBRA),[facility], NP/OP SWAB IN UTM/VTM/SALINE TRANSPORT MEDIA,24 HR TAT - Swab, Nasopharynx [[id number]]  (Normal) Collected: 04/05/22 1938    Lab Status: Final result Specimen: Swab from Nasopharynx Updated: 04/06/22 0552     COVID19 Not Detected    Narrative:      Fact sheet for providers: https://www.fda.gov/media/745472/download     Fact sheet for patients: https://www.fda.gov/media/605886/download    Test performed by RT PCR.    Blood Culture - Blood, Arm, Left [[id number]]  (Normal) Collected: 04/05/22 1933    Lab Status: Preliminary result Specimen: Blood from Arm, Left Updated: 04/08/22 1945     Blood Culture No growth at 3 days    Blood Culture - Blood, Arm, Left [[id number]]  (Normal) Collected: 04/05/22 1933    Lab Status: Preliminary result Specimen: Blood from Arm, Left Updated: 04/08/22 1945     Blood Culture No growth at 3 days          04/05/22 2032  CT Chest    Cardiomegaly with small bilateral pleural effusions, smooth interlobular septal thickening and ground-glass opacities likely representing  pulmonary edema pattern. Central bronchiectasis involving both lungs with surrounding consolidation likely representing superimposed infection. Enlarged right paratracheal lymph node, likely reactive to underlying pulmonary disease.     04/05/22 1549  XR Chest    Persistent right upper lobe airspace opacity representing focal infection or mass. Diffuse interstitial opacities within the left lung likely representing multifocal pneumonia. Probable small right-sided pleural effusion.    OTHER ANTIMICROBIAL THERAPY: Cefepime, remdesivir, tobramycin nebs    ASSESSMENT / PLAN:  Lab Results   Component Value Date    [facility] 29 (C) 01/30/2019    VANCORANDOM 12.45 04/07/2022     Current regimen is vancomycin 1000 mg q24hr. Per [facility], this regimen should provide:    AUC24,ss: 471 mg/L.hr  PAUC*: 67 %  Ctrough,ss: 14.1 mg/L  Pconc*: 25 %  Tox.: 9 %    Will continue same for now.

## 2022-04-10 ENCOUNTER — APPOINTMENT (OUTPATIENT)
Dept: GENERAL RADIOLOGY | Facility: HOSPITAL | Age: 57
End: 2022-04-10

## 2022-04-10 LAB
ALBUMIN SERPL-MCNC: 3.4 G/DL (ref 3.5–5.2)
ALBUMIN/GLOB SERPL: 0.8 G/DL
ALP SERPL-CCNC: 146 U/L (ref 39–117)
ALT SERPL W P-5'-P-CCNC: 19 U/L (ref 1–41)
ANION GAP SERPL CALCULATED.3IONS-SCNC: 11.4 MMOL/L (ref 5–15)
AST SERPL-CCNC: 18 U/L (ref 1–40)
BACTERIA SPEC AEROBE CULT: NORMAL
BACTERIA SPEC AEROBE CULT: NORMAL
BILIRUB SERPL-MCNC: 0.2 MG/DL (ref 0–1.2)
BUN SERPL-MCNC: 61 MG/DL (ref 6–20)
BUN/CREAT SERPL: 29.8 (ref 7–25)
CALCIUM SPEC-SCNC: 9.2 MG/DL (ref 8.6–10.5)
CHLORIDE SERPL-SCNC: 94 MMOL/L (ref 98–107)
CO2 SERPL-SCNC: 26.6 MMOL/L (ref 22–29)
CREAT SERPL-MCNC: 2.05 MG/DL (ref 0.76–1.27)
DEPRECATED RDW RBC AUTO: 40.4 FL (ref 37–54)
EGFRCR SERPLBLD CKD-EPI 2021: 37.3 ML/MIN/1.73
ERYTHROCYTE [DISTWIDTH] IN BLOOD BY AUTOMATED COUNT: 12.9 % (ref 12.3–15.4)
GLOBULIN UR ELPH-MCNC: 4.1 GM/DL
GLUCOSE BLDC GLUCOMTR-MCNC: 192 MG/DL (ref 70–99)
GLUCOSE BLDC GLUCOMTR-MCNC: 213 MG/DL (ref 70–99)
GLUCOSE BLDC GLUCOMTR-MCNC: 218 MG/DL (ref 70–99)
GLUCOSE BLDC GLUCOMTR-MCNC: 244 MG/DL (ref 70–99)
GLUCOSE SERPL-MCNC: 290 MG/DL (ref 65–99)
HCT VFR BLD AUTO: 33.7 % (ref 37.5–51)
HGB BLD-MCNC: 10.7 G/DL (ref 13–17.7)
MCH RBC QN AUTO: 27 PG (ref 26.6–33)
MCHC RBC AUTO-ENTMCNC: 31.8 G/DL (ref 31.5–35.7)
MCV RBC AUTO: 84.9 FL (ref 79–97)
PLATELET # BLD AUTO: 416 10*3/MM3 (ref 140–450)
PMV BLD AUTO: 9.1 FL (ref 6–12)
POTASSIUM SERPL-SCNC: 4.3 MMOL/L (ref 3.5–5.2)
PROT SERPL-MCNC: 7.5 G/DL (ref 6–8.5)
RBC # BLD AUTO: 3.97 10*6/MM3 (ref 4.14–5.8)
SODIUM SERPL-SCNC: 132 MMOL/L (ref 136–145)
WBC NRBC COR # BLD: 14.13 10*3/MM3 (ref 3.4–10.8)

## 2022-04-10 PROCEDURE — 97110 THERAPEUTIC EXERCISES: CPT

## 2022-04-10 PROCEDURE — 25010000002 METHYLPREDNISOLONE PER 40 MG: Performed by: NURSE PRACTITIONER

## 2022-04-10 PROCEDURE — 80053 COMPREHEN METABOLIC PANEL: CPT | Performed by: INTERNAL MEDICINE

## 2022-04-10 PROCEDURE — 63710000001 INSULIN DETEMIR PER 5 UNITS: Performed by: INTERNAL MEDICINE

## 2022-04-10 PROCEDURE — 94660 CPAP INITIATION&MGMT: CPT

## 2022-04-10 PROCEDURE — 25010000002 FUROSEMIDE PER 20 MG: Performed by: INTERNAL MEDICINE

## 2022-04-10 PROCEDURE — 25010000002 VANCOMYCIN 5 G RECONSTITUTED SOLUTION: Performed by: NURSE PRACTITIONER

## 2022-04-10 PROCEDURE — 97530 THERAPEUTIC ACTIVITIES: CPT

## 2022-04-10 PROCEDURE — 85027 COMPLETE CBC AUTOMATED: CPT | Performed by: INTERNAL MEDICINE

## 2022-04-10 PROCEDURE — 99232 SBSQ HOSP IP/OBS MODERATE 35: CPT | Performed by: INTERNAL MEDICINE

## 2022-04-10 PROCEDURE — 94799 UNLISTED PULMONARY SVC/PX: CPT

## 2022-04-10 PROCEDURE — 71045 X-RAY EXAM CHEST 1 VIEW: CPT

## 2022-04-10 PROCEDURE — 25010000002 REMDESIVIR 100 MG RECONSTITUTED SOLUTION: Performed by: NURSE PRACTITIONER

## 2022-04-10 PROCEDURE — 82962 GLUCOSE BLOOD TEST: CPT

## 2022-04-10 PROCEDURE — 63710000001 INSULIN LISPRO (HUMAN) PER 5 UNITS: Performed by: HOSPITALIST

## 2022-04-10 PROCEDURE — 25010000002 CEFEPIME PER 500 MG: Performed by: NURSE PRACTITIONER

## 2022-04-10 PROCEDURE — 99233 SBSQ HOSP IP/OBS HIGH 50: CPT | Performed by: INTERNAL MEDICINE

## 2022-04-10 PROCEDURE — 94761 N-INVAS EAR/PLS OXIMETRY MLT: CPT

## 2022-04-10 PROCEDURE — 63710000001 INSULIN LISPRO (HUMAN) PER 5 UNITS: Performed by: INTERNAL MEDICINE

## 2022-04-10 RX ADMIN — HYDRALAZINE HYDROCHLORIDE 25 MG: 25 TABLET, FILM COATED ORAL at 21:04

## 2022-04-10 RX ADMIN — INSULIN LISPRO 4 UNITS: 100 INJECTION, SOLUTION INTRAVENOUS; SUBCUTANEOUS at 08:37

## 2022-04-10 RX ADMIN — REMDESIVIR 100 MG: 100 INJECTION, POWDER, LYOPHILIZED, FOR SOLUTION INTRAVENOUS at 12:12

## 2022-04-10 RX ADMIN — IPRATROPIUM BROMIDE AND ALBUTEROL SULFATE 3 ML: 2.5; .5 SOLUTION RESPIRATORY (INHALATION) at 18:21

## 2022-04-10 RX ADMIN — HYDRALAZINE HYDROCHLORIDE 25 MG: 25 TABLET, FILM COATED ORAL at 08:36

## 2022-04-10 RX ADMIN — IPRATROPIUM BROMIDE AND ALBUTEROL SULFATE 3 ML: 2.5; .5 SOLUTION RESPIRATORY (INHALATION) at 07:42

## 2022-04-10 RX ADMIN — FAMOTIDINE 20 MG: 20 TABLET ORAL at 17:28

## 2022-04-10 RX ADMIN — INSULIN LISPRO 4 UNITS: 100 INJECTION, SOLUTION INTRAVENOUS; SUBCUTANEOUS at 12:13

## 2022-04-10 RX ADMIN — APIXABAN 5 MG: 5 TABLET, FILM COATED ORAL at 21:05

## 2022-04-10 RX ADMIN — FUROSEMIDE 40 MG: 10 INJECTION, SOLUTION INTRAMUSCULAR; INTRAVENOUS at 08:36

## 2022-04-10 RX ADMIN — TRAZODONE HYDROCHLORIDE 100 MG: 100 TABLET ORAL at 21:05

## 2022-04-10 RX ADMIN — METOPROLOL TARTRATE 100 MG: 50 TABLET, FILM COATED ORAL at 08:36

## 2022-04-10 RX ADMIN — METHYLPREDNISOLONE SODIUM SUCCINATE 20 MG: 40 INJECTION, POWDER, FOR SOLUTION INTRAMUSCULAR; INTRAVENOUS at 17:28

## 2022-04-10 RX ADMIN — TAMSULOSIN HYDROCHLORIDE 0.4 MG: 0.4 CAPSULE ORAL at 17:28

## 2022-04-10 RX ADMIN — DILTIAZEM HYDROCHLORIDE 120 MG: 120 CAPSULE, COATED, EXTENDED RELEASE ORAL at 08:36

## 2022-04-10 RX ADMIN — INSULIN LISPRO 4 UNITS: 100 INJECTION, SOLUTION INTRAVENOUS; SUBCUTANEOUS at 21:23

## 2022-04-10 RX ADMIN — ATORVASTATIN CALCIUM 20 MG: 20 TABLET, FILM COATED ORAL at 21:05

## 2022-04-10 RX ADMIN — GABAPENTIN 300 MG: 300 CAPSULE ORAL at 21:05

## 2022-04-10 RX ADMIN — TOBRAMYCIN 300 MG: 300 SOLUTION RESPIRATORY (INHALATION) at 11:14

## 2022-04-10 RX ADMIN — ARFORMOTEROL TARTRATE 15 MCG: 15 SOLUTION RESPIRATORY (INHALATION) at 07:42

## 2022-04-10 RX ADMIN — GUAIFENESIN 1200 MG: 600 TABLET ORAL at 08:36

## 2022-04-10 RX ADMIN — DULOXETINE HYDROCHLORIDE 60 MG: 30 CAPSULE, DELAYED RELEASE ORAL at 21:04

## 2022-04-10 RX ADMIN — FAMOTIDINE 20 MG: 20 TABLET ORAL at 08:36

## 2022-04-10 RX ADMIN — BUDESONIDE 0.5 MG: 0.5 SUSPENSION RESPIRATORY (INHALATION) at 18:21

## 2022-04-10 RX ADMIN — INSULIN DETEMIR 20 UNITS: 100 INJECTION, SOLUTION SUBCUTANEOUS at 08:40

## 2022-04-10 RX ADMIN — DULOXETINE HYDROCHLORIDE 60 MG: 30 CAPSULE, DELAYED RELEASE ORAL at 08:36

## 2022-04-10 RX ADMIN — APIXABAN 5 MG: 5 TABLET, FILM COATED ORAL at 08:36

## 2022-04-10 RX ADMIN — GUAIFENESIN 1200 MG: 600 TABLET ORAL at 21:05

## 2022-04-10 RX ADMIN — CEFEPIME 2 G: 1 INJECTION, SOLUTION INTRAVENOUS at 21:22

## 2022-04-10 RX ADMIN — CEFEPIME 2 G: 1 INJECTION, SOLUTION INTRAVENOUS at 08:40

## 2022-04-10 RX ADMIN — VANCOMYCIN HYDROCHLORIDE 1000 MG: 5 INJECTION, POWDER, LYOPHILIZED, FOR SOLUTION INTRAVENOUS at 15:04

## 2022-04-10 RX ADMIN — IPRATROPIUM BROMIDE AND ALBUTEROL SULFATE 3 ML: 2.5; .5 SOLUTION RESPIRATORY (INHALATION) at 00:01

## 2022-04-10 RX ADMIN — METHYLPREDNISOLONE SODIUM SUCCINATE 20 MG: 40 INJECTION, POWDER, FOR SOLUTION INTRAMUSCULAR; INTRAVENOUS at 05:26

## 2022-04-10 RX ADMIN — BUDESONIDE 0.5 MG: 0.5 SUSPENSION RESPIRATORY (INHALATION) at 07:42

## 2022-04-10 RX ADMIN — IPRATROPIUM BROMIDE AND ALBUTEROL SULFATE 3 ML: 2.5; .5 SOLUTION RESPIRATORY (INHALATION) at 11:17

## 2022-04-10 RX ADMIN — INSULIN LISPRO 25 UNITS: 100 INJECTION, SOLUTION INTRAVENOUS; SUBCUTANEOUS at 17:29

## 2022-04-10 RX ADMIN — ASPIRIN 81 MG: 81 TABLET, COATED ORAL at 08:36

## 2022-04-10 RX ADMIN — INSULIN LISPRO 25 UNITS: 100 INJECTION, SOLUTION INTRAVENOUS; SUBCUTANEOUS at 08:37

## 2022-04-10 RX ADMIN — FERROUS SULFATE TAB 325 MG (65 MG ELEMENTAL FE) 325 MG: 325 (65 FE) TAB at 08:36

## 2022-04-10 RX ADMIN — INSULIN DETEMIR 50 UNITS: 100 INJECTION, SOLUTION SUBCUTANEOUS at 21:23

## 2022-04-10 RX ADMIN — INSULIN LISPRO 2 UNITS: 100 INJECTION, SOLUTION INTRAVENOUS; SUBCUTANEOUS at 17:29

## 2022-04-10 RX ADMIN — INSULIN LISPRO 25 UNITS: 100 INJECTION, SOLUTION INTRAVENOUS; SUBCUTANEOUS at 12:12

## 2022-04-10 RX ADMIN — ARFORMOTEROL TARTRATE 15 MCG: 15 SOLUTION RESPIRATORY (INHALATION) at 18:21

## 2022-04-10 RX ADMIN — METOPROLOL TARTRATE 100 MG: 50 TABLET, FILM COATED ORAL at 21:04

## 2022-04-10 NOTE — PROGRESS NOTES
Pulmonary / Critical Care Progress Note      Patient Name: Preston Wallis  : 1965  MRN: [id number]  Attending:  Leonard Multani DO  Date of admission: 2022    Subjective   Subjective   Follow-up for acute on chronic hypoxic respiratory failure secondary to COVID-19.    Over past 24 hours, has been on room air, maintaining sats. Occasionally uses 1L NC and Bipap at night and during naps. Continues on antibiotics, nebs, lasix and steroids (decreased yesterday).    No acute events overnight.    This morning,  Sitting up in chair, on room air with O2 sats 93-95%, wore BiPap for 6 hours HS  Awake, alert and answering questions  Feels better  Continues to diurese well  Continues with 1-2+ pitting edema in his lower extremities  Nonproductive cough  Blood sugars improving with reduction of steroids yesterday  No chest pain  No fever or chills      Review of Systems  General: Fatigue, otherwise denied complaints  Cardiovascular: Orthopnea, leg swelling, otherwise denied complaints  Respiratory: Dyspnea, cough, otherwise denied complaints  Gastrointestinal: Denied complaints  Musculoskeletal: Weakness, otherwise denied complaints    Objective   Objective     Vitals:   Temp:  [97.3 °F (36.3 °C)-98.4 °F (36.9 °C)] 97.9 °F (36.6 °C)  Heart Rate:  [68-82] 68  Resp:  [18-22] 18  BP: (154-172)/(57-70) 165/69  Flow (L/min):  [1] 1    Physical Exam   Vital Signs Reviewed   General: Obese male, awake and alert, NAD on room air   HEENT:  Dentition poor, Mallampati 4/4 PERRL, EOMI.  OP, nares clear, earring  Chest: Unlabored respirations, wheezing noted throughout all lung fields, speaking full sentences  CV: NSR, no MGR, pulses 2+, equal  Abd:  Obese, Soft, NT, ND, + BS, no HSM  EXT:  no clubbing, no cyanosis, 1-2+ BLE edema, endorsing numbness and tingling to bilateral legs  Neuro:  A&Ox3, CN grossly intact, no focal deficits  Skin: No rashes or lesions noted, multiple tattoos    Result Review    Result Review:  I have  personally reviewed the results from the time of this admission to 4/10/2022 06:45 EDT and agree with these findings:  [x]  Laboratory  [x]  Microbiology  [x]  Radiology  [x]  EKG/Telemetry   []  Cardiology/Vascular   []  Pathology  []  Old records  []  Other:  Most notable findings include: Blood sugars up to 290 , 132 sodium, Cr slight increase to 2.05    CRP trending down 16.7 --> 9.73 -> 3.34  D-dimer 1.69      4/7 0630 ABG --> 7.25, 65.1, 86.1, 28.5 on 6 L nasal cannula    4/6 Covid PCR positive  Covid rapid swab negative  Strep and Legionella negative  MRSA PCR positive    Assessment/Plan   Assessment / Plan     Active Hospital Problems:  Active Hospital Problems    Diagnosis    • **Acute on chronic respiratory failure with hypoxia (HCC)    • Acute respiratory failure with hypercapnia (HCC)    • Type 2 diabetes mellitus with hyperglycemia ([facility])    • Therapeutic drug monitoring    • Obesity (BMI 30-39.9)    • Chronic anticoagulation    • Paroxysmal atrial fibrillation (HCC)    • Essential (primary) hypertension    • Multifocal pneumonia    • Acute hypoxemic respiratory failure due to COVID-19 ([facility])    • COPD with exacerbation ([facility])      Impression:    1. MRSA pneumonia recurrent  history of Covid pneumonia 10/2021  Pseudomonas pneumonia 2019 and 2020  MRSA pneumonia 1/2019     2. Covid: positive 4/6/22, re-infection from 10/2021, unvaccinated      --> elevated inflammatory markers     3.  Acute exacerbation of heart failure: proBNP 1966, unknown EF  CT with small bilateral pleural effusions and pulmonary edema pattern     4. Acute exacerbation bronchiectasis: ? smart vest in the past     5. Acute exacerbation COPD: clinically severe, FEV1 of 21% in 2008, 24-pack-year smoking history   a) oxygen dependent   b) chronic bronchitis   c) bronchiectasis   d) recurrent pneumonia     6. Acute on chronic hypoxic respiratory failure: Secondary to above     7. ERIC on chronic kidney disease: Baseline creatinine 1.5,  diabetic nephropathy     8. MILADY: PSG unavailable, noncompliant with BiPAP, 2019 overnight oximetry with 100 minutes of desaturations low was 81%     9. Paroxysmal A. Fib: On chronic anticoagulation Eliquis     10. Pulmonary embolism: 2019 on Eliquis     11. NIDDM: 3/22 A1c 8.3     12.  Obesity: BMI 40     13.  Medical noncompliance    Plan:    Follow-up CXR today     Vanco # 5  Cefepime  # 5  Jt Nebs # 5    transitioned off of NIPPV to NC and very comfortable with NC, now tolerating room air  Continue to wean O2 to keep sats 88-92%.    Continue aggressive airway clearance with MetaNeb.  Continue Pulmicort, Brovana, and DuoNebs.  Continue bronchopulmonary hygiene. Encourage I-S and flutter valve.  Continue Mucinex     COVID-19 PCR positive.  Completed Remdesivir  Not a candidate for Actemra at this time due to MRSA pneumonia.  Continue Solu-Medrol, decreased from 40 mg IV twice daily to 20 mg twice daily on 4/9  Continue to trend inflammatory markers every 48 hours,  Downtrending nicely     Continue Lasix 40 mg IV twice daily. Mobilize for dependent edema in feet/lower legs.  Trend renal panel and electrolytes.  Pending echocardiogram.     Continue Eliquis.     Pending alpha 1 antitrypsin and phenotype.     Encourage activity.  Up to chair as tolerated.  PT/OT on board.     Speech therapy on board and appreciate recommendations.  No overt signs of aspiration noted.  Regular diet with thin liquids.     RT  assist patient in getting nasal pillows for home BiPAP machine, chest vest, and arrange for pulmonary rehab.     Not up-to-date on vaccines: No Covid/pneumonia/flu vaccines obtained     Would benefit from outpatient pulmonary rehab.  Will need outpatient PFTs.  Will need to reestablish care in our office.  Safe for discharge tomorrow from pulmonary standpoint.    DVT prophylaxis:  Medical DVT prophylaxis orders are present.    CODE STATUS:   Level Of Support Discussed With: Patient  Code Status  (Patient has no pulse and is not breathing): CPR (Attempt to Resuscitate)  Medical Interventions (Patient has pulse or is breathing): Full Support    Labs, microbiology, radiology, medications, and provider notes personally reviewed.  Discussed with primary services and bedside RN.  Remains ill with multiple medical problems/ guarded status/30 minutes       Yessi BEAVER [facility]-NP, am scribing for & in the presence of Dr. Lopes on 04/10/2022, who was present during rounds with me.    Part of this note may be an electronic transcription/translation of spoken language to printed text using the Dragon Dictation System.

## 2022-04-10 NOTE — PROGRESS NOTES
[facility]   Hospitalist Progress Note  Date: 4/10/2022  Patient Name: Preston Wallis  : 1965  MRN: [id number]  Date of admission: 2022      Subjective   Subjective     Chief Complaint: follow up for shortness of breath    Summary: 55 y/o M with COPD, sleep apnea, noncompliance with CPAP/inhalers, CKD stage III, type II DM with complications who presented with worsening shortness of air.  Oxygen saturation 89% on 4 L.  CT of the chest showed central bronchiectasis with surrounding consolidation involving both lungs + pulmonary edema.  Covid positive, on remdesivir.  On broad-spectrum antibiotics, diuretics. Pulmonary on board.  Clinically improved.  Weaned to room air.    Interval Followup: No issues overnight.  Remains on room air.  No fevers.  Blood pressures well controlled.  Has been tolerating NIPPV.  Up in chair this morning.  No acute complaints.  Feels swelling in the feet has improved.  Denies trouble breathing, cough, chest pain, wheezing, hemoptysis.  Tolerating oral intake.  Ambulating independently.  No new complaints.    Review of Systems  All other systems reviewed and negative unless stated above    Objective   Objective     Vitals:   Temp:  [97 °F (36.1 °C)-98.4 °F (36.9 °C)] 97 °F (36.1 °C)  Heart Rate:  [68-82] 71  Resp:  [18-22] 18  BP: (145-172)/(64-70) 145/64  Physical Exam    Constitutional:  male, up in chair, conversant, pleasant, NAD   Eyes: Pupils equal and reactive, no conjunctival injection   HENT: NCAT, nares patent, MMM   Neck: Supple, trachea midline   Respiratory: Improved aeration, less wheezing, nonlabored respiration   Cardiovascular: RRR, no murmurs, trace BLE    Gastrointestinal: Positive bowel sounds, soft, nontender, nondistended   Musculoskeletal: No gross deformities, no clubbing or cyanosis to extremities   Neurologic: Alert and oriented x3, cranial Nerves grossly intact speech clear   Skin: Warm and dry, no rashes     Result Review    Result  Review:  I have personally reviewed the results from the time of this admission to 4/10/2022 11:59 EDT and agree with these findings:  [x]  Laboratory  CBC    CBC 4/8/22 4/9/22 4/10/22   WBC 12.92 (A) 13.33 (A) 14.13 (A)   RBC 3.66 (A) 3.78 (A) 3.97 (A)   Hemoglobin 10.2 (A) 10.5 (A) 10.7 (A)   Hematocrit 32.2 (A) 33.1 (A) 33.7 (A)   MCV 88.0 87.6 84.9   MCH 27.9 27.8 27.0   MCHC 31.7 31.7 31.8   RDW 13.2 12.7 12.9   Platelets 392 405 416   (A) Abnormal value            BMP    BMP 4/8/22 4/9/22 4/10/22   BUN 59 (A) 57 (A) 61 (A)   Creatinine 2.21 (A) 1.88 (A) 2.05 (A)   Sodium 135 (A) 130 (A) 132 (A)   Potassium 4.5 4.7 4.3   Chloride 95 (A) 93 (A) 94 (A)   CO2 26.9 27.4 26.6   Calcium 9.4 9.1 9.2   (A) Abnormal value                [x]  Microbiology respiratory panel positive for Covid  MRSA PCR positive  Blood cultures NGTD  [x]  Radiology  [x]  EKG/Telemetry NSR, no events  []  Cardiology/Vascular   []  Pathology  []  Old records  [x]  Other:     Intake/Output Summary (Last 24 hours) at 4/10/2022 1159  Last data filed at 4/10/2022 0724  Gross per 24 hour   Intake 1190 ml   Output 6900 ml   Net -5710 ml         Assessment/Plan   Assessment / Plan     Assessment/Plan:  Active Hospital Problems    Diagnosis  POA   • **Acute on chronic respiratory failure with hypoxia (HCC) [J96.21]  Unknown   • Acute respiratory failure with hypercapnia (HCC) [J96.02]  Unknown   • Type 2 diabetes mellitus with hyperglycemia (HCC) [E11.65]  Unknown   • Therapeutic drug monitoring [Z51.81]  Not Applicable   • Obesity (BMI 30-39.9) [E66.9]  Unknown   • Chronic anticoagulation [Z79.01]  Not Applicable   • Paroxysmal atrial fibrillation (HCC) [I48.0]  Yes   • Essential (primary) hypertension [I10]  Yes   • Multifocal pneumonia [J18.9]  Yes   • Acute hypoxemic respiratory failure due to COVID-19 (HCC) [U07.1, J96.01]  Yes   • COPD with exacerbation (HCC) [J44.1]  Yes        Clinically continues to do well.  Remains on room air.   Tolerating NIPPV at night.     -> Working on getting BiPAP set up for him at home; RT CM to follow-up tomorrow.  Continue enhanced airborne isolation  Complete 5-day course of remdesivir today  Pulmonology following; steroid regimen per their recommendation  Continue scheduled Brovana/Pulmicort/DuoNebs  Continue IV vancomycin and cefepime, day 3.  Continue tobramycin nebs.    Continue IV Lasix to 40 mg daily. Trend renal function and electrolytes  BP/HR controlled. Continue p.o. diltiazem  mg + Lopressor 100 mg twice daily.   Hemoglobin stable.  Continue Eliquis  Blood sugar not at goal.  Increase morning Levemir to 20 units daily. Continue Levemir 50 units q. nightly. Continue Humalog 25 units three times a day.   Continue moderate dose SSI  Continue baby aspirin and statin  Activity ad juanjo  CBC, CMP in a.m.    Discussed plan with staff.     DVT prophylaxis:  Medical DVT prophylaxis orders are present.    CODE STATUS:   Level Of Support Discussed With: Patient  Code Status (Patient has no pulse and is not breathing): CPR (Attempt to Resuscitate)  Medical Interventions (Patient has pulse or is breathing): Full Support    Electronically signed by Leonard Multani DO, 04/10/22, 11:59 AM EDT.

## 2022-04-10 NOTE — PROGRESS NOTES
"PHARMACY TO DOSE VANCOMYCIN DAY: 5  DURATION OF THERAPY: 4-13-22    INDICATION: PNEUMONIA  GOAL AUC: 400-600 MG/L.HR    HT: 175.3 cm (69\")      04/05/22 2151      Weight: 123 kg (270 lb 8.1 oz)        Estimated Creatinine Clearance: 52.1 mL/min (A) (by C-G formula based on SCr of 2.05 mg/dL (H)).  HD/CRRT/PD? NO  CONTRAST ADMINISTERED? LUMASON on 4-9-22  I/O last 3 completed shifts:  In: 2010 [P.O.:1560; IV Piggyback:450]  Out: 8670 [Urine:8670]    WBC: 14.13  TMAX: AF    Microbiology Results (last 10 days)       Procedure Component Value - Date/Time    Legionella Antigen, Urine - Urine, Urine, Clean Catch [[id number]]  (Normal) Collected: 04/06/22 1540    Lab Status: Final result Specimen: Urine, Clean Catch Updated: 04/06/22 1644     LEGIONELLA ANTIGEN, URINE Negative    S. Pneumo Ag Urine or CSF - Urine, Urine, Clean Catch [[id number]]  (Normal) Collected: 04/06/22 1540    Lab Status: Final result Specimen: Urine, Clean Catch Updated: 04/06/22 1645     Strep Pneumo Ag Negative    Respiratory Panel PCR w/COVID-19(SARS-CoV-2) [facility]/ROBERTA/OCHOA/PAD/COR/MAD/LUIS In-House, NP Swab in UTM/VTM, 3-4 HR TAT - Swab, Nasopharynx [[id number]]  (Abnormal) Collected: 04/06/22 1102    Lab Status: Final result Specimen: Swab from Nasopharynx Updated: 04/06/22 1231     ADENOVIRUS, PCR Not Detected     Coronavirus 229E Not Detected     Coronavirus HKU1 Not Detected     Coronavirus NL63 Not Detected     Coronavirus OC43 Not Detected     COVID19 Detected     Human Metapneumovirus Not Detected     Human Rhinovirus/Enterovirus Not Detected     Influenza A PCR Not Detected     Influenza B PCR Not Detected     Parainfluenza Virus 1 Not Detected     Parainfluenza Virus 2 Not Detected     Parainfluenza Virus 3 Not Detected     Parainfluenza Virus 4 Not Detected     RSV, PCR Not Detected     Bordetella pertussis pcr Not Detected     Bordetella parapertussis PCR Not Detected     Chlamydophila pneumoniae PCR Not Detected     Mycoplasma pneumo by PCR " Not Detected    Narrative:      In the setting of a positive respiratory panel with a viral infection PLUS a negative procalcitonin without other underlying concern for bacterial infection, consider observing off antibiotics or discontinuation of antibiotics and continue supportive care. If the respiratory panel is positive for atypical bacterial infection (Bordetella pertussis, Chlamydophila pneumoniae, or Mycoplasma pneumoniae), consider antibiotic de-escalation to target atypical bacterial infection.    MRSA Screen, PCR (Inpatient) - Swab, Nares [[id number]]  (Abnormal) Collected: 04/06/22 1102    Lab Status: Final result Specimen: Swab from Nares Updated: 04/06/22 1423     MRSA PCR MRSA Detected    Mycoplasma Pneumoniae Antibody, IgM - Blood, [[id number]]  (Normal) Collected: 04/05/22 2230    Lab Status: Final result Specimen: Blood Updated: 04/06/22 1109     Mycoplasma pneumo IgM Negative    COVID-19,APTIMA PANTHER(DEBRA),BH [facility]/BH [facility], NP/OP SWAB IN UTM/VTM/SALINE TRANSPORT MEDIA,24 HR TAT - Swab, Nasopharynx [[id number]]  (Normal) Collected: 04/05/22 1938    Lab Status: Final result Specimen: Swab from Nasopharynx Updated: 04/06/22 0552     COVID19 Not Detected    Narrative:      Fact sheet for providers: https://www.fda.gov/media/708099/download     Fact sheet for patients: https://www.fda.gov/media/147801/download    Test performed by RT PCR.    Blood Culture - Blood, Arm, Left [[id number]]  (Normal) Collected: 04/05/22 1933    Lab Status: Preliminary result Specimen: Blood from Arm, Left Updated: 04/09/22 1947     Blood Culture No growth at 4 days    Blood Culture - Blood, Arm, Left [[id number]]  (Normal) Collected: 04/05/22 1933    Lab Status: Preliminary result Specimen: Blood from Arm, Left Updated: 04/09/22 1946     Blood Culture No growth at 4 days          04/05/22 2032  CT Chest    Cardiomegaly with small bilateral pleural effusions, smooth interlobular septal thickening and ground-glass opacities likely  representing pulmonary edema pattern. Central bronchiectasis involving both lungs with surrounding consolidation likely representing superimposed infection. Enlarged right paratracheal lymph node, likely reactive to underlying pulmonary disease.     04/05/22 1549  XR Chest    Persistent right upper lobe airspace opacity representing focal infection or mass. Diffuse interstitial opacities within the left lung likely representing multifocal pneumonia. Probable small right-sided pleural effusion.    OTHER ANTIMICROBIAL THERAPY: Cefepime, tobramycin nebs    ASSESSMENT / PLAN:  Lab Results   Component Value Date    [facility] 29 (C) 01/30/2019    VANCORANDOM 12.45 04/07/2022     Current regimen is vancomycin 1000 mg q24hr. Per [facility], this regimen should provide:    AUC24,ss: 476 mg/L.hr  PAUC*: 70 %  Ctrough,ss: 14.7 mg/L  Pconc*: 25 %  Tox.: 10 %    Will continue same regimen for now. Will check a trough level at 1300 tomorrow  Labs ordered: CMP ordered for a.m.

## 2022-04-10 NOTE — THERAPY TREATMENT NOTE
Acute Care - Physical Therapy Progress Note   [facility]     Patient Name: Preston Wallis  : 1965  MRN: [id number]  Today's Date: 4/10/2022      Visit Dx:     ICD-10-CM ICD-9-CM   1. Multifocal pneumonia  J18.9 486   2. Chronic obstructive pulmonary disease, unspecified COPD type ([facility])  J44.9 496   3. Non-STEMI (non-ST elevated myocardial infarction) ([facility])  I21.4 410.70   4. Oropharyngeal dysphagia  R13.12 787.22   5. Difficulty walking  R26.2 719.7   6. Decreased activities of daily living (ADL)  Z78.9 V49.89   7. Obesity (BMI 30-39.9)  E66.9 278.00   8. Obstructive sleep apnea (adult) (pediatric)  G47.33 327.23   9. COVID-19  U07.1 079.89     Patient Active Problem List   Diagnosis   • COPD with exacerbation ([facility])   • Cough   • Acute hypoxemic respiratory failure due to COVID-19 ([facility])   • Type 1 diabetes mellitus with diabetic chronic kidney disease ([facility])   • Polyneuropathy   • Multifocal pneumonia   • Peripheral neuropathy   • Polyneuropathy   • Paroxysmal atrial fibrillation ([facility])   • Obstructive sleep apnea (adult) (pediatric)   • MRSA pneumonia ([facility])   • Low back pain   • Insomnia   • Essential (primary) hypertension   • Chronic kidney disease   • Chronic diastolic (congestive) heart failure ([facility])   • Anemia   • Allergies   • Acute on chronic respiratory failure with hypoxia ([facility])   • COPD (chronic obstructive pulmonary disease) ([facility])   • Obesity (BMI 30-39.9)   • Chronic anticoagulation   • Acute respiratory failure with hypercapnia ([facility])   • Type 2 diabetes mellitus with hyperglycemia ([facility])   • Therapeutic drug monitoring     Past Medical History:   Diagnosis Date   • Age-related cognitive decline    • Allergic contact dermatitis    • Allergies    • Anemia    • Bronchiectasis with acute lower respiratory infection ([facility])    • Chest pain    • Chronic bronchitis ([facility])    • Chronic diastolic (congestive) heart failure ([facility])    • Chronic kidney disease    • Chronic respiratory failure with hypoxia ([facility])     • COPD (chronic obstructive pulmonary disease) ([facility])    • COPD with acute exacerbation ([facility])    • Cough    • Dependence on supplemental oxygen    • Eczema    • Erectile dysfunction     due to organic reasons   • Essential (primary) hypertension    • Fracture     closed fracture of other tarsal and metatarsal bones   • GERD without esophagitis    • High risk medication use    • Hypercholesteremia    • Hypomagnesemia    • Insomnia    • Low back pain    • Major depressive disorder    • Major depressive disorder    • Morbid (severe) obesity due to excess calories ([facility])    • MRSA pneumonia ([facility])    • Muscle weakness    • 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 without evidence of necrosis ([facility])    • Obstructive sleep apnea (adult) (pediatric)    • Other forms of dyspnea    • Other long term (current) drug therapy    • Other pulmonary embolism without acute cor pulmonale ([facility])    • Other specified noninfective gastroenteritis and colitis    • Other spondylosis, lumbar region    • Pain in both knees    • Paroxysmal atrial fibrillation ([facility])    • Peripheral neuropathy     attributed to type 2 diabetes   • Pneumonia, unspecified organism    • Polyneuropathy    • Rash and other nonspecific skin eruption    • Syncope and collapse    • Tachycardia    • Type 1 diabetes mellitus with diabetic chronic kidney disease ([facility])    • Type 2 diabetes mellitus ([facility])    • Unspecified fall, initial encounter      Past Surgical History:   Procedure Laterality Date   • CHOLECYSTECTOMY     • KNEE SURGERY Left    • OTHER SURGICAL HISTORY Left     venous port   • TONSILLECTOMY AND ADENOIDECTOMY       PT Assessment (last 12 hours)     PT Evaluation and Treatment     Row Name 04/10/22 1300          Physical Therapy Time and Intention    Subjective Information no complaints  -CS     Document Type therapy note (daily note)  -CS     Mode of Treatment individual therapy;physical therapy  -CS     Patient  Effort good  -CS     Symptoms Noted During/After Treatment none  -CS     Row Name 04/10/22 1300          Transfers    Sit-Stand [place] (Transfers) contact guard;1 person assist  -CS     Stand-Sit [place] (Transfers) contact guard;1 person assist  -CS     Row Name 04/10/22 1300          Sit-Stand Transfer    Assistive Device (Sit-Stand Transfers) walker, front-wheeled  -CS     Row Name 04/10/22 1300          Stand-Sit Transfer    Assistive Device (Stand-Sit Transfers) walker, front-wheeled  -CS     Row Name 04/10/22 1300          Gait/Stairs (Locomotion)    Gait/Stairs Locomotion gait/ambulation independence;gait/ambulation assistive device;distance ambulated;gait pattern  -CS     [place] Level (Gait) contact guard;1 person assist  -CS     Assistive Device (Gait) walker, front-wheeled  -CS     Distance in Feet (Gait) 20  -CS     Pattern (Gait) 4-point;step-through  -CS     Deviations/Abnormal Patterns (Gait) christine decreased;festinating/shuffling;gait speed decreased  -CS     Bilateral Gait Deviations forward flexed posture  -CS     Row Name 04/10/22 1300          Hip (Therapeutic Exercise)    Hip (Therapeutic Exercise) AROM (active range of motion)  -CS     Hip AROM (Therapeutic Exercise) bilateral;flexion;extension;aBduction;aDduction;sitting;30 repititions  -CS     Row Name 04/10/22 1300          Knee (Therapeutic Exercise)    Knee (Therapeutic Exercise) AROM (active range of motion)  -CS     Knee AROM (Therapeutic Exercise) bilateral;LAQ (long arc quad);sitting;30 repititions  -CS     Row Name 04/10/22 1300          Ankle (Therapeutic Exercise)    Ankle (Therapeutic Exercise) AROM (active range of motion)  -CS     Ankle AROM (Therapeutic Exercise) bilateral;dorsiflexion;plantarflexion;sitting;30 repititions  -CS     Row Name 04/10/22 1300          Vital Signs    Pre SpO2 (%) 95  -CS     O2 Delivery Pre Treatment other (see comments)  Room air  -CS     Intra SpO2 (%) 91  -CS     O2 Delivery Intra  Treatment other (see comments)  Room air  -CS     Post SpO2 (%) 94  -CS     O2 Delivery Post Treatment other (see comments)  room air  -CS     Pre Patient Position Sitting  -CS     Intra Patient Position Standing  -CS     Post Patient Position Sitting  -CS     Row Name 04/10/22 1300          Progress Summary (PT)    Daily Progress Summary (PT) Ambulation performed in pt's room today while on room air.  SPO2 monitored throughout Tx while on room air and pt. sats maintained at least 91% throughout Tx today.  Pt. reports that distance ambulated today was relatively the same distance as his PLOF.  -CS           User Key  (r) = Recorded By, (t) = Taken By, (c) = Cosigned By    Initials Name Provider Type    Reilly Lombardo, DEBBY Physical Therapist Assistant                Physical Therapy Education                 Title: PT OT SLP Therapies (Done)     Topic: Physical Therapy (Done)     Point: Mobility training (Done)     Learning Progress Summary           Patient Acceptance, E,TB, VU,NR by AT at 4/9/2022 2152    Acceptance, E, VU by TK at 4/6/2022 1138                   Point: Home exercise program (Done)     Learning Progress Summary           Patient Acceptance, E,TB, VU,NR by AT at 4/9/2022 2152    Acceptance, E, VU by TK at 4/6/2022 1138                   Point: Body mechanics (Done)     Learning Progress Summary           Patient Acceptance, E,TB, VU,NR by AT at 4/9/2022 2152    Acceptance, E, VU by TK at 4/6/2022 1138                   Point: Precautions (Done)     Learning Progress Summary           Patient Acceptance, E,TB, VU,NR by AT at 4/9/2022 2152    Acceptance, E, VU by TK at 4/6/2022 1138                               User Key     Initials Effective Dates Name Provider Type Discipline    AT 06/16/21 -  Yessica Santiago, RN Registered Nurse Nurse    TK 02/09/22 -  Alber Dickson, PT Student PT Student PT              PT Recommendation and Plan     Progress Summary (PT)  Daily Progress Summary (PT): Ambulation  performed in pt's room today while on room air.  SPO2 monitored throughout Tx while on room air and pt. sats maintained at least 91% throughout Tx today.  Pt. reports that distance ambulated today was relatively the same distance as his PLOF.   Outcome Measures     Row Name 04/10/22 1300 04/08/22 0844          How much help from another person do you currently need...    Turning from your back to your side while in flat bed without using bedrails? 3  -CS 3  -WM     Moving from lying on back to sitting on the side of a flat bed without bedrails? 3  -CS 3  -WM     Moving to and from a bed to a chair (including a wheelchair)? 3  -CS 3  -WM     Standing up from a chair using your arms (e.g., wheelchair, bedside chair)? 3  -CS 3  -WM     Climbing 3-5 steps with a railing? 2  -CS 2  -WM     To walk in hospital room? 3  -CS 3  -WM     AM-PAC 6 Clicks Score (PT) 17  -CS 17  -WM            Functional Assessment    Outcome Measure Options AM-PAC 6 Clicks Basic Mobility (PT)  -CS --           User Key  (r) = Recorded By, (t) = Taken By, (c) = Cosigned By    Initials Name Provider Type     Walter Lee PTA Physical Therapist Assistant    Reilly Lombardo PTA Physical Therapist Assistant                 Time Calculation:    PT Charges     Row Name 04/10/22 1336             Time Calculation    Start Time 0919  -CS      PT Received On 04/10/22  -CS              Timed Charges    92939 - PT Therapeutic Exercise Minutes 13  -CS      45648 - PT Therapeutic Activity Minutes 12  -CS              Total Minutes    Timed Charges Total Minutes 25  -CS       Total Minutes 25  -CS            User Key  (r) = Recorded By, (t) = Taken By, (c) = Cosigned By    Initials Name Provider Type     Reilly Pond PTA Physical Therapist Assistant              Therapy Charges for Today     Code Description Service Date Service Provider Modifiers Qty    69746047458 HC PT THER PROC EA 15 MIN 4/10/2022 Reilly Pond PTA GP 1    [id number]  HC PT THERAPEUTIC ACT EA 15 MIN 4/10/2022 Reilly Pond, DEBBY GP 1          PT G-Codes  Outcome Measure Options: AM-PAC 6 Clicks Basic Mobility (PT)  AM-PAC 6 Clicks Score (PT): 17  AM-PAC 6 Clicks Score (OT): 16    Reilly Pond PTA  4/10/2022

## 2022-04-10 NOTE — PLAN OF CARE
Problem: Adult Inpatient Plan of Care  Goal: Plan of Care Review  Outcome: Ongoing, Progressing  Flowsheets (Taken 4/10/2022 0402)  Progress: improving  Plan of Care Reviewed With: patient  Outcome Evaluation: paient tolerating bipap well at night. when not on bipap staying on room air. no other changes.  Goal: Patient-Specific Goal (Individualized)  Outcome: Ongoing, Progressing  Goal: Absence of Hospital-Acquired Illness or Injury  Outcome: Ongoing, Progressing  Intervention: Identify and Manage Fall Risk  Recent Flowsheet Documentation  Taken 4/10/2022 0203 by Yessica Santiago RN  Safety Promotion/Fall Prevention:   safety round/check completed   room organization consistent   assistive device/personal items within reach   clutter free environment maintained  Taken 4/9/2022 2349 by Yessica Santiago RN  Safety Promotion/Fall Prevention:   assistive device/personal items within reach   clutter free environment maintained   safety round/check completed   room organization consistent  Taken 4/9/2022 2104 by Yessica Santiago RN  Safety Promotion/Fall Prevention:   assistive device/personal items within reach   clutter free environment maintained   safety round/check completed   room organization consistent  Taken 4/9/2022 1931 by Yessica Santiago RN  Safety Promotion/Fall Prevention:   assistive device/personal items within reach   clutter free environment maintained   safety round/check completed   room organization consistent  Intervention: Prevent Infection  Recent Flowsheet Documentation  Taken 4/10/2022 0203 by Yessica Santiago RN  Infection Prevention:   single patient room provided   rest/sleep promoted   environmental surveillance performed   equipment surfaces disinfected  Taken 4/9/2022 2349 by Yessica Santiago RN  Infection Prevention:   single patient room provided   rest/sleep promoted   environmental surveillance performed   equipment surfaces disinfected  Taken 4/9/2022 2104 by Yessica Santiago RN  Infection Prevention:   single  patient room provided   rest/sleep promoted   environmental surveillance performed   equipment surfaces disinfected  Taken 4/9/2022 1931 by Yessica Santiago RN  Infection Prevention:   single patient room provided   rest/sleep promoted   environmental surveillance performed   equipment surfaces disinfected  Goal: Optimal Comfort and Wellbeing  Outcome: Ongoing, Progressing  Intervention: Provide Person-Centered Care  Recent Flowsheet Documentation  Taken 4/9/2022 1931 by Yessica Santiago RN  Trust Relationship/Rapport:   care explained   choices provided   emotional support provided   empathic listening provided   questions answered   questions encouraged   reassurance provided   thoughts/feelings acknowledged  Goal: Readiness for Transition of Care  Outcome: Ongoing, Progressing     Problem: Fall Injury Risk  Goal: Absence of Fall and Fall-Related Injury  Outcome: Ongoing, Progressing  Intervention: Identify and Manage Contributors  Recent Flowsheet Documentation  Taken 4/10/2022 0203 by Yessica Santiago RN  Medication Review/Management: medications reviewed  Taken 4/9/2022 2349 by Yessica Santiago RN  Medication Review/Management: medications reviewed  Taken 4/9/2022 2104 by Yessica Santiago RN  Medication Review/Management: medications reviewed  Taken 4/9/2022 1931 by Yessica Santiago RN  Medication Review/Management: medications reviewed  Intervention: Promote Injury-Free Environment  Recent Flowsheet Documentation  Taken 4/10/2022 0203 by Yessica Santiago RN  Safety Promotion/Fall Prevention:   safety round/check completed   room organization consistent   assistive device/personal items within reach   clutter free environment maintained  Taken 4/9/2022 2349 by Yessica Santiago RN  Safety Promotion/Fall Prevention:   assistive device/personal items within reach   clutter free environment maintained   safety round/check completed   room organization consistent  Taken 4/9/2022 2104 by Yessica Santiago RN  Safety Promotion/Fall Prevention:    assistive device/personal items within reach   clutter free environment maintained   safety round/check completed   room organization consistent  Taken 4/9/2022 1931 by Yessica Santiago, RN  Safety Promotion/Fall Prevention:   assistive device/personal items within reach   clutter free environment maintained   safety round/check completed   room organization consistent     Problem: Skin Injury Risk Increased  Goal: Skin Health and Integrity  Outcome: Ongoing, Progressing     Problem: Airway Clearance Ineffective  Goal: Effective Airway Clearance  Outcome: Ongoing, Progressing  Intervention: Promote Airway Secretion Clearance  Recent Flowsheet Documentation  Taken 4/9/2022 1931 by Yessica Santiago, RN  Cough And Deep Breathing: done independently per patient   Goal Outcome Evaluation:  Plan of Care Reviewed With: patient        Progress: improving  Outcome Evaluation: paient tolerating bipap well at night. when not on bipap staying on room air. no other changes.

## 2022-04-11 ENCOUNTER — READMISSION MANAGEMENT (OUTPATIENT)
Dept: CALL CENTER | Facility: HOSPITAL | Age: 57
End: 2022-04-11

## 2022-04-11 VITALS
SYSTOLIC BLOOD PRESSURE: 151 MMHG | BODY MASS INDEX: 40.07 KG/M2 | DIASTOLIC BLOOD PRESSURE: 61 MMHG | RESPIRATION RATE: 18 BRPM | HEIGHT: 69 IN | HEART RATE: 70 BPM | WEIGHT: 270.5 LBS | OXYGEN SATURATION: 100 % | TEMPERATURE: 97.7 F

## 2022-04-11 PROBLEM — U07.1 ACUTE HYPOXEMIC RESPIRATORY FAILURE DUE TO COVID-19: Status: RESOLVED | Noted: 2021-10-19 | Resolved: 2022-04-11

## 2022-04-11 PROBLEM — D89.833 CYTOKINE RELEASE SYNDROME, GRADE 3: Status: RESOLVED | Noted: 2022-04-11 | Resolved: 2022-04-11

## 2022-04-11 PROBLEM — Z51.81 THERAPEUTIC DRUG MONITORING: Status: RESOLVED | Noted: 2022-04-07 | Resolved: 2022-04-11

## 2022-04-11 PROBLEM — R77.8 ELEVATED TROPONIN: Status: RESOLVED | Noted: 2022-04-11 | Resolved: 2022-04-11

## 2022-04-11 PROBLEM — D89.833 CYTOKINE RELEASE SYNDROME, GRADE 3: Status: ACTIVE | Noted: 2022-04-11

## 2022-04-11 PROBLEM — J96.01 ACUTE HYPOXEMIC RESPIRATORY FAILURE DUE TO COVID-19: Status: RESOLVED | Noted: 2021-10-19 | Resolved: 2022-04-11

## 2022-04-11 PROBLEM — J96.02 ACUTE RESPIRATORY FAILURE WITH HYPERCAPNIA (HCC): Status: RESOLVED | Noted: 2022-04-07 | Resolved: 2022-04-11

## 2022-04-11 PROBLEM — R79.89 ELEVATED TROPONIN: Status: RESOLVED | Noted: 2022-04-11 | Resolved: 2022-04-11

## 2022-04-11 PROBLEM — R77.8 ELEVATED TROPONIN: Status: ACTIVE | Noted: 2022-04-11

## 2022-04-11 PROBLEM — J96.21 ACUTE ON CHRONIC RESPIRATORY FAILURE WITH HYPOXIA: Status: RESOLVED | Noted: 2022-04-05 | Resolved: 2022-04-11

## 2022-04-11 PROBLEM — R79.89 ELEVATED TROPONIN: Status: ACTIVE | Noted: 2022-04-11

## 2022-04-11 LAB
ALBUMIN SERPL-MCNC: 3.5 G/DL (ref 3.5–5.2)
ALBUMIN/GLOB SERPL: 1 G/DL
ALP SERPL-CCNC: 141 U/L (ref 39–117)
ALT SERPL W P-5'-P-CCNC: 20 U/L (ref 1–41)
ANION GAP SERPL CALCULATED.3IONS-SCNC: 10.6 MMOL/L (ref 5–15)
AST SERPL-CCNC: 18 U/L (ref 1–40)
BH CV ECHO MEAS - AO ROOT DIAM: 3.1 CM
BH CV ECHO MEAS - EDV(MOD-SP2): 103 ML
BH CV ECHO MEAS - EDV(MOD-SP4): 107 ML
BH CV ECHO MEAS - EF(MOD-BP): 56.3 %
BH CV ECHO MEAS - ESV(MOD-SP2): 46 ML
BH CV ECHO MEAS - ESV(MOD-SP4): 47 ML
BH CV ECHO MEAS - IVSD: 0.7 CM
BH CV ECHO MEAS - LA DIMENSION(2D): 3.6 CM
BH CV ECHO MEAS - LVIDD: 5.7 CM
BH CV ECHO MEAS - LVIDS: 3.9 CM
BH CV ECHO MEAS - LVOT DIAM: 2 CM
BH CV ECHO MEAS - LVPWD: 1 CM
BH CV ECHO MEAS - MV A MAX VEL: 125 CM/SEC
BH CV ECHO MEAS - MV DEC TIME: 155 MSEC
BH CV ECHO MEAS - MV E MAX VEL: 97 CM/SEC
BH CV ECHO MEAS - MV E/A: 0.8
BH CV ECHO MEAS - RVDD: 2.8 CM
BILIRUB SERPL-MCNC: 0.2 MG/DL (ref 0–1.2)
BUN SERPL-MCNC: 62 MG/DL (ref 6–20)
BUN/CREAT SERPL: 32.8 (ref 7–25)
CALCIUM SPEC-SCNC: 9.1 MG/DL (ref 8.6–10.5)
CHLORIDE SERPL-SCNC: 96 MMOL/L (ref 98–107)
CO2 SERPL-SCNC: 28.4 MMOL/L (ref 22–29)
CREAT SERPL-MCNC: 1.89 MG/DL (ref 0.76–1.27)
DEPRECATED RDW RBC AUTO: 40.2 FL (ref 37–54)
EGFRCR SERPLBLD CKD-EPI 2021: 41.1 ML/MIN/1.73
ERYTHROCYTE [DISTWIDTH] IN BLOOD BY AUTOMATED COUNT: 12.9 % (ref 12.3–15.4)
GLOBULIN UR ELPH-MCNC: 3.6 GM/DL
GLUCOSE BLDC GLUCOMTR-MCNC: 245 MG/DL (ref 70–99)
GLUCOSE BLDC GLUCOMTR-MCNC: 294 MG/DL (ref 70–99)
GLUCOSE SERPL-MCNC: 275 MG/DL (ref 65–99)
HCT VFR BLD AUTO: 35 % (ref 37.5–51)
HGB BLD-MCNC: 11.2 G/DL (ref 13–17.7)
LEFT ATRIUM VOLUME INDEX: 17.3 ML/M2
MAXIMAL PREDICTED HEART RATE: 164 BPM
MCH RBC QN AUTO: 27.6 PG (ref 26.6–33)
MCHC RBC AUTO-ENTMCNC: 32 G/DL (ref 31.5–35.7)
MCV RBC AUTO: 86.2 FL (ref 79–97)
PLATELET # BLD AUTO: 406 10*3/MM3 (ref 140–450)
PMV BLD AUTO: 9.1 FL (ref 6–12)
POTASSIUM SERPL-SCNC: 4.1 MMOL/L (ref 3.5–5.2)
PROT SERPL-MCNC: 7.1 G/DL (ref 6–8.5)
RBC # BLD AUTO: 4.06 10*6/MM3 (ref 4.14–5.8)
SODIUM SERPL-SCNC: 135 MMOL/L (ref 136–145)
STRESS TARGET HR: 139 BPM
VANCOMYCIN TROUGH SERPL-MCNC: 18.88 MCG/ML (ref 5–20)
WBC NRBC COR # BLD: 13.86 10*3/MM3 (ref 3.4–10.8)

## 2022-04-11 PROCEDURE — 82962 GLUCOSE BLOOD TEST: CPT

## 2022-04-11 PROCEDURE — 25010000002 FUROSEMIDE PER 20 MG: Performed by: INTERNAL MEDICINE

## 2022-04-11 PROCEDURE — 94799 UNLISTED PULMONARY SVC/PX: CPT

## 2022-04-11 PROCEDURE — 85027 COMPLETE CBC AUTOMATED: CPT | Performed by: INTERNAL MEDICINE

## 2022-04-11 PROCEDURE — 25010000002 REMDESIVIR 100 MG/20ML SOLUTION 1 EACH VIAL: Performed by: NURSE PRACTITIONER

## 2022-04-11 PROCEDURE — 80202 ASSAY OF VANCOMYCIN: CPT | Performed by: NURSE PRACTITIONER

## 2022-04-11 PROCEDURE — 97530 THERAPEUTIC ACTIVITIES: CPT

## 2022-04-11 PROCEDURE — 97110 THERAPEUTIC EXERCISES: CPT

## 2022-04-11 PROCEDURE — 94761 N-INVAS EAR/PLS OXIMETRY MLT: CPT

## 2022-04-11 PROCEDURE — 25010000002 METHYLPREDNISOLONE PER 40 MG: Performed by: NURSE PRACTITIONER

## 2022-04-11 PROCEDURE — 80053 COMPREHEN METABOLIC PANEL: CPT | Performed by: INTERNAL MEDICINE

## 2022-04-11 PROCEDURE — 99239 HOSP IP/OBS DSCHRG MGMT >30: CPT | Performed by: INTERNAL MEDICINE

## 2022-04-11 PROCEDURE — 63710000001 INSULIN DETEMIR PER 5 UNITS: Performed by: INTERNAL MEDICINE

## 2022-04-11 PROCEDURE — 94664 DEMO&/EVAL PT USE INHALER: CPT

## 2022-04-11 PROCEDURE — 25010000002 CEFEPIME PER 500 MG: Performed by: NURSE PRACTITIONER

## 2022-04-11 PROCEDURE — 63710000001 INSULIN LISPRO (HUMAN) PER 5 UNITS: Performed by: HOSPITALIST

## 2022-04-11 PROCEDURE — 63710000001 INSULIN LISPRO (HUMAN) PER 5 UNITS: Performed by: INTERNAL MEDICINE

## 2022-04-11 PROCEDURE — 99232 SBSQ HOSP IP/OBS MODERATE 35: CPT | Performed by: INTERNAL MEDICINE

## 2022-04-11 PROCEDURE — 94660 CPAP INITIATION&MGMT: CPT

## 2022-04-11 RX ORDER — DOXYCYCLINE HYCLATE 100 MG/1
100 CAPSULE ORAL 2 TIMES DAILY
Qty: 4 CAPSULE | Refills: 0 | Status: SHIPPED | OUTPATIENT
Start: 2022-04-11 | End: 2022-06-02

## 2022-04-11 RX ORDER — ARFORMOTEROL TARTRATE 15 UG/2ML
15 SOLUTION RESPIRATORY (INHALATION)
Qty: 120 ML | Refills: 0 | Status: SHIPPED | OUTPATIENT
Start: 2022-04-11 | End: 2022-06-03 | Stop reason: SDUPTHER

## 2022-04-11 RX ORDER — PREDNISONE 20 MG/1
TABLET ORAL
Qty: 10 TABLET | Refills: 0 | Status: SHIPPED | OUTPATIENT
Start: 2022-04-12 | End: 2022-04-20

## 2022-04-11 RX ORDER — FUROSEMIDE 40 MG/1
40 TABLET ORAL DAILY
Qty: 30 TABLET | Refills: 0 | Status: SHIPPED | OUTPATIENT
Start: 2022-04-11 | End: 2022-06-02

## 2022-04-11 RX ORDER — BUDESONIDE 0.5 MG/2ML
0.5 INHALANT ORAL
Qty: 120 ML | Refills: 0 | Status: SHIPPED | OUTPATIENT
Start: 2022-04-11 | End: 2022-06-03 | Stop reason: SDUPTHER

## 2022-04-11 RX ADMIN — DILTIAZEM HYDROCHLORIDE 120 MG: 120 CAPSULE, COATED, EXTENDED RELEASE ORAL at 08:24

## 2022-04-11 RX ADMIN — IPRATROPIUM BROMIDE AND ALBUTEROL SULFATE 3 ML: 2.5; .5 SOLUTION RESPIRATORY (INHALATION) at 06:29

## 2022-04-11 RX ADMIN — INSULIN LISPRO 25 UNITS: 100 INJECTION, SOLUTION INTRAVENOUS; SUBCUTANEOUS at 12:01

## 2022-04-11 RX ADMIN — APIXABAN 5 MG: 5 TABLET, FILM COATED ORAL at 08:24

## 2022-04-11 RX ADMIN — DULOXETINE HYDROCHLORIDE 60 MG: 30 CAPSULE, DELAYED RELEASE ORAL at 08:23

## 2022-04-11 RX ADMIN — TOBRAMYCIN 300 MG: 300 SOLUTION RESPIRATORY (INHALATION) at 12:32

## 2022-04-11 RX ADMIN — FERROUS SULFATE TAB 325 MG (65 MG ELEMENTAL FE) 325 MG: 325 (65 FE) TAB at 08:24

## 2022-04-11 RX ADMIN — IPRATROPIUM BROMIDE AND ALBUTEROL SULFATE 3 ML: 2.5; .5 SOLUTION RESPIRATORY (INHALATION) at 00:00

## 2022-04-11 RX ADMIN — BUDESONIDE 0.5 MG: 0.5 SUSPENSION RESPIRATORY (INHALATION) at 06:29

## 2022-04-11 RX ADMIN — ASPIRIN 81 MG: 81 TABLET, COATED ORAL at 08:24

## 2022-04-11 RX ADMIN — ARFORMOTEROL TARTRATE 15 MCG: 15 SOLUTION RESPIRATORY (INHALATION) at 06:29

## 2022-04-11 RX ADMIN — INSULIN LISPRO 4 UNITS: 100 INJECTION, SOLUTION INTRAVENOUS; SUBCUTANEOUS at 08:24

## 2022-04-11 RX ADMIN — FAMOTIDINE 20 MG: 20 TABLET ORAL at 08:23

## 2022-04-11 RX ADMIN — IPRATROPIUM BROMIDE AND ALBUTEROL SULFATE 3 ML: 2.5; .5 SOLUTION RESPIRATORY (INHALATION) at 12:32

## 2022-04-11 RX ADMIN — METHYLPREDNISOLONE SODIUM SUCCINATE 20 MG: 40 INJECTION, POWDER, FOR SOLUTION INTRAMUSCULAR; INTRAVENOUS at 05:33

## 2022-04-11 RX ADMIN — INSULIN DETEMIR 20 UNITS: 100 INJECTION, SOLUTION SUBCUTANEOUS at 08:25

## 2022-04-11 RX ADMIN — HYDRALAZINE HYDROCHLORIDE 25 MG: 25 TABLET, FILM COATED ORAL at 08:24

## 2022-04-11 RX ADMIN — TOBRAMYCIN 300 MG: 300 SOLUTION RESPIRATORY (INHALATION) at 00:00

## 2022-04-11 RX ADMIN — GUAIFENESIN 1200 MG: 600 TABLET ORAL at 08:23

## 2022-04-11 RX ADMIN — FUROSEMIDE 40 MG: 10 INJECTION, SOLUTION INTRAMUSCULAR; INTRAVENOUS at 08:23

## 2022-04-11 RX ADMIN — CEFEPIME 2 G: 1 INJECTION, SOLUTION INTRAVENOUS at 10:06

## 2022-04-11 RX ADMIN — INSULIN LISPRO 6 UNITS: 100 INJECTION, SOLUTION INTRAVENOUS; SUBCUTANEOUS at 12:01

## 2022-04-11 RX ADMIN — INSULIN LISPRO 25 UNITS: 100 INJECTION, SOLUTION INTRAVENOUS; SUBCUTANEOUS at 08:24

## 2022-04-11 RX ADMIN — METOPROLOL TARTRATE 100 MG: 50 TABLET, FILM COATED ORAL at 08:24

## 2022-04-11 NOTE — PLAN OF CARE
Goal Outcome Evaluation:  Plan of Care Reviewed With: patient           Outcome Evaluation: Patient tolerating BIPAP well tonight. No acute changes, VSS.

## 2022-04-11 NOTE — CASE MANAGEMENT/SOCIAL WORK
[facility] arrived to set up home NIV but pt had already discharged.  RT CM phoned pt on cell phone, he stated he was still in [place], at [facility]'s to get something to eat.  RT CM provided pt with [facility] address and instructed pt to meet the RT's at the [facility] office for NIV set up.  RT CM will submit PA's for pt's nebulized brovana and budesonide through cover my meds.  RT CM confirms office notes documenting prior trials of Dulera, Symbicort, Breo, Spiriva and Stiolto. Due to confirmed very severe COPD (FEV1 21% of predicted),acute on chronic respiratory failure and pt currently being COVID positive,pt is unable to perform proper inspiratory flow or breath hold required to continue maintenance mdi treatment; will need nebulized LABA & ICS on discharge.

## 2022-04-11 NOTE — PROGRESS NOTES
Pulmonary / Critical Care Progress Note      Patient Name: Preston Wallis  : 1965  MRN: [id number]  Attending:  Leonard Multani DO  Date of admission: 2022    Subjective   Subjective   Follow-up for acute on chronic hypoxic respiratory failure secondary to COVID-19.    On room air  Feels breathing is improved  Using NIPPV  Breathing treatments helping  No chest pain      Review of Systems  General: Fatigue, otherwise denied complaints  Cardiovascular: Orthopnea, leg swelling, otherwise denied complaints  Respiratory: Dyspnea, cough, otherwise denied complaints  Gastrointestinal: Denied complaints  Musculoskeletal: Weakness, otherwise denied complaints    Objective   Objective     Vitals:   Temp:  [97.7 °F (36.5 °C)-98.8 °F (37.1 °C)] 97.7 °F (36.5 °C)  Heart Rate:  [71-85] 71  Resp:  [18-24] 18  BP: (152-175)/(58-79) 160/79  Flow (L/min):  [2] 2    Physical Exam   Vital Signs Reviewed   General: Obese male, awake and alert, NAD on room air   HEENT:  Dentition poor, Mallampati 4/4 PERRL, EOMI.  OP, nares clear, earring  Chest: Unlabored respirations, wheezing noted throughout all lung fields, speaking full sentences  CV: NSR, no MGR, pulses 2+, equal  Abd:  Obese, Soft, NT, ND, + BS, no HSM  EXT:  no clubbing, no cyanosis, 1-2+ BLE edema, endorsing numbness and tingling to bilateral legs  Neuro:  A&Ox3, CN grossly intact, no focal deficits  Skin: No rashes or lesions noted, multiple tattoos    Result Review    Result Review:  I have personally reviewed the results from the time of this admission to 2022 11:57 EDT and agree with these findings:  [x]  Laboratory  [x]  Microbiology  [x]  Radiology  [x]  EKG/Telemetry   []  Cardiology/Vascular   []  Pathology  []  Old records  []  Other:  Most notable findings include: Blood sugars up to 290 , 132 sodium, Cr slight increase to 2.05    CRP trending down 16.7 --> 9.73 -> 3.34  D-dimer 1.69       0630 ABG --> 7.25, 65.1, 86.1, 28.5 on 6 L nasal  cannula    4/6 Covid PCR positive  Covid rapid swab negative  Strep and Legionella negative  MRSA PCR positive    Assessment/Plan   Assessment / Plan     Active Hospital Problems:  Active Hospital Problems    Diagnosis    • **Acute on chronic respiratory failure with hypoxia ([facility])    • Acute respiratory failure with hypercapnia ([facility])    • Type 2 diabetes mellitus with hyperglycemia ([facility])    • Therapeutic drug monitoring    • Obesity (BMI 30-39.9)    • Chronic anticoagulation    • Paroxysmal atrial fibrillation ([facility])    • Essential (primary) hypertension    • Multifocal pneumonia    • Acute hypoxemic respiratory failure due to COVID-19 ([facility])    • COPD with exacerbation ([facility])      Impression:    1. MRSA pneumonia recurrent  history of Covid pneumonia 10/2021  Pseudomonas pneumonia 2019 and 2020  MRSA pneumonia 1/2019     2. Covid: positive 4/6/22, re-infection from 10/2021, unvaccinated      --> elevated inflammatory markers     3.  Acute exacerbation of heart failure: proBNP 1966, unknown EF  CT with small bilateral pleural effusions and pulmonary edema pattern     4. Acute exacerbation bronchiectasis: ? smart vest in the past     5. Acute exacerbation COPD: clinically severe, FEV1 of 21% in 2008, 24-pack-year smoking history   a) oxygen dependent   b) chronic bronchitis   c) bronchiectasis   d) recurrent pneumonia     6. Acute on chronic hypoxic respiratory failure: Secondary to above     7. ERIC on chronic kidney disease: Baseline creatinine 1.5, diabetic nephropathy     8. MILADY: PSG unavailable, noncompliant with BiPAP, 2019 overnight oximetry with 100 minutes of desaturations low was 81%     9. Paroxysmal A. Fib: On chronic anticoagulation Eliquis     10. Pulmonary embolism: 2019 on Eliquis     11. NIDDM: 3/22 A1c 8.3     12.  Obesity: BMI 40     13.  Medical noncompliance    Plan:  Patient doing well from pulmonary standpoint  On room air  Continue Brovana Pulmicort duo nebs  Continue bronchopulmonary  hygiene  Recommend continuing steroid and slowly tapering course the next 7 days  We will try to arrange NIPPV for home use    Will need to reestablish care in our office.  Safe for discharge from pulmonary standpoint.    We will sign off on the patient please call with questions  DVT prophylaxis:  Medical DVT prophylaxis orders are present.    CODE STATUS:   Level Of Support Discussed With: Patient  Code Status (Patient has no pulse and is not breathing): CPR (Attempt to Resuscitate)  Medical Interventions (Patient has pulse or is breathing): Full Support    Electronically signed by Walter Nicole DO, 04/11/22, 11:57 AM EDT.  '

## 2022-04-11 NOTE — THERAPY TREATMENT NOTE
Acute Care - Physical Therapy Treatment Note   [facility]     Patient Name: Preston Wallis  : 1965  MRN: [id number]  Today's Date: 2022      Visit Dx:     ICD-10-CM ICD-9-CM   1. Multifocal pneumonia  J18.9 486   2. Chronic obstructive pulmonary disease, unspecified COPD type ([facility])  J44.9 496   3. Non-STEMI (non-ST elevated myocardial infarction) ([facility])  I21.4 410.70   4. Oropharyngeal dysphagia  R13.12 787.22   5. Difficulty walking  R26.2 719.7   6. Decreased activities of daily living (ADL)  Z78.9 V49.89   7. Obesity (BMI 30-39.9)  E66.9 278.00   8. Obstructive sleep apnea (adult) (pediatric)  G47.33 327.23   9. COVID-19  U07.1 079.89     Patient Active Problem List   Diagnosis   • COPD with exacerbation ([facility])   • Cough   • Acute hypoxemic respiratory failure due to COVID-19 ([facility])   • Type 1 diabetes mellitus with diabetic chronic kidney disease ([facility])   • Polyneuropathy   • Multifocal pneumonia   • Peripheral neuropathy   • Polyneuropathy   • Paroxysmal atrial fibrillation ([facility])   • Obstructive sleep apnea (adult) (pediatric)   • MRSA pneumonia ([facility])   • Low back pain   • Insomnia   • Essential (primary) hypertension   • Chronic kidney disease   • Chronic diastolic (congestive) heart failure ([facility])   • Anemia   • Allergies   • Acute on chronic respiratory failure with hypoxia ([facility])   • COPD (chronic obstructive pulmonary disease) ([facility])   • Obesity (BMI 30-39.9)   • Chronic anticoagulation   • Acute respiratory failure with hypercapnia ([facility])   • Type 2 diabetes mellitus with hyperglycemia ([facility])   • Therapeutic drug monitoring     Past Medical History:   Diagnosis Date   • Age-related cognitive decline    • Allergic contact dermatitis    • Allergies    • Anemia    • Bronchiectasis with acute lower respiratory infection ([facility])    • Chest pain    • Chronic bronchitis ([facility])    • Chronic diastolic (congestive) heart failure ([facility])    • Chronic kidney disease    • Chronic respiratory failure with hypoxia ([facility])     • COPD (chronic obstructive pulmonary disease) ([facility])    • COPD with acute exacerbation ([facility])    • Cough    • Dependence on supplemental oxygen    • Eczema    • Erectile dysfunction     due to organic reasons   • Essential (primary) hypertension    • Fracture     closed fracture of other tarsal and metatarsal bones   • GERD without esophagitis    • High risk medication use    • Hypercholesteremia    • Hypomagnesemia    • Insomnia    • Low back pain    • Major depressive disorder    • Major depressive disorder    • Morbid (severe) obesity due to excess calories ([facility])    • MRSA pneumonia ([facility])    • Muscle weakness    • 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 without evidence of necrosis ([facility])    • Obstructive sleep apnea (adult) (pediatric)    • Other forms of dyspnea    • Other long term (current) drug therapy    • Other pulmonary embolism without acute cor pulmonale ([facility])    • Other specified noninfective gastroenteritis and colitis    • Other spondylosis, lumbar region    • Pain in both knees    • Paroxysmal atrial fibrillation ([facility])    • Peripheral neuropathy     attributed to type 2 diabetes   • Pneumonia, unspecified organism    • Polyneuropathy    • Rash and other nonspecific skin eruption    • Syncope and collapse    • Tachycardia    • Type 1 diabetes mellitus with diabetic chronic kidney disease ([facility])    • Type 2 diabetes mellitus ([facility])    • Unspecified fall, initial encounter      Past Surgical History:   Procedure Laterality Date   • CHOLECYSTECTOMY     • KNEE SURGERY Left    • OTHER SURGICAL HISTORY Left     venous port   • TONSILLECTOMY AND ADENOIDECTOMY       PT Assessment (last 12 hours)     PT Evaluation and Treatment     Row Name 04/11/22 1232          Physical Therapy Time and Intention    Subjective Information no complaints (P)   -PJ     Document Type therapy note (daily note) (P)   -PJ     Mode of Treatment individual therapy;physical therapy (P)   -PJ      Patient Effort good (P)   -PJ     Symptoms Noted During/After Treatment increased pain;fatigue (P)   Neuropathic pain in Bilateral feet with WB and ambulation.  -PJ     Row Name 04/11/22 1232          General Information    Patient Profile Reviewed yes (P)   -PJ     Patient Observations alert;cooperative;agree to therapy (P)   -PJ     Existing Precautions/Restrictions fall (P)   -PJ     Row Name 04/11/22 1232          Pain    Pretreatment Pain Rating 0/10 - no pain (P)   -PJ     Posttreatment Pain Rating 0/10 - no pain (P)   -PJ     Pain Location - Side/Orientation Bilateral (P)   -PJ     Pain Location other (see comments) (P)   [place]  -PJ     Pain Location - foot (P)   -PJ     Pre/Posttreatment Pain Comment Neuropathic pain 7/10 with WB and ambulation. Dissipated sitting in recliner. (P)   -PJ     Row Name 04/11/22 1232          Cognition    Orientation Status (Cognition) oriented x 3 (P)   -PJ     Follows Commands (Cognition) WFL (P)   -PJ     Row Name 04/11/22 1232          Transfers    Transfers sit-stand transfer;stand-sit transfer (P)   -PJ     Sit-Stand [place] (Transfers) minimum assist (75% patient effort);1 person assist;verbal cues (P)   -PJ     Stand-Sit [place] (Transfers) minimum assist (75% patient effort);1 person assist;verbal cues (P)   -PJ     Row Name 04/11/22 1232          Sit-Stand Transfer    Assistive Device (Sit-Stand Transfers) walker, front-wheeled (P)   -PJ     Comment, (Sit-Stand Transfer) x2 (P)   -PJ     Row Name 04/11/22 1232          Stand-Sit Transfer    Assistive Device (Stand-Sit Transfers) walker, front-wheeled (P)   -PJ     Comment, (Stand-Sit Transfer) Poor eccentric control (P)   -PJ     Row Name 04/11/22 1232          Gait/Stairs (Locomotion)    Gait/Stairs Locomotion gait/ambulation independence;gait/ambulation assistive device;distance ambulated;gait pattern (P)   -PJ     [place] Level (Gait) contact guard;1 person assist (P)   -PJ     Assistive Device  (Gait) walker, front-wheeled;other (see comments) (P)   Recliner in tow  -PJ     Distance in Feet (Gait) 20 (P)   Seated rest break after 10 ft  -PJ     Pattern (Gait) 4-point;step-through (P)   -PJ     Deviations/Abnormal Patterns (Gait) christine decreased;festinating/shuffling;gait speed decreased (P)   -PJ     Bilateral Gait Deviations forward flexed posture (P)   -PJ     Gait Assessment/Intervention Pt requiring seated rest break after 10 ft due to fatigue and neuropathic pain. SpO2 at 91% post ambulation. (P)   -PJ     Row Name 04/11/22 1232          Safety Issues, Functional Mobility    Impairments Affecting Function (Mobility) balance;endurance/activity tolerance;pain;strength (P)   -PJ     Row Name 04/11/22 1232          Balance    Balance Assessment standing dynamic balance (P)   -PJ     Dynamic Standing Balance contact guard;verbal cues (P)   -PJ     Position/Device Used, Standing Balance supported;walker, front-wheeled (P)   -PJ     Balance Interventions sit to stand;standing;supported;dynamic;weight shifting activity (P)   -PJ     Comment, Balance Good dynamic standing balance with RW use during ambulation. (P)   -PJ     Row Name 04/11/22 1232          Motor Skills    Therapeutic Exercise hip;knee;ankle (P)   -PJ     Row Name 04/11/22 1232          Hip (Therapeutic Exercise)    Hip (Therapeutic Exercise) AROM (active range of motion);isometric exercises (P)   -PJ     Hip AROM (Therapeutic Exercise) bilateral;flexion;aBduction;sitting;30 repititions (P)   -PJ     Hip Isometrics (Therapeutic Exercise) bilateral;aDduction;3 sets;10 repetitions;sitting (P)   -PJ     Row Name 04/11/22 1232          Knee (Therapeutic Exercise)    Knee (Therapeutic Exercise) AROM (active range of motion) (P)   -PJ     Knee AROM (Therapeutic Exercise) bilateral;LAQ (long arc quad);sitting;30 repititions (P)   -PJ     Row Name 04/11/22 1232          Ankle (Therapeutic Exercise)    Ankle (Therapeutic Exercise) AROM (active range of  motion) (P)   -PJ     Ankle AROM (Therapeutic Exercise) bilateral;dorsiflexion;plantarflexion;sitting;30 repititions (P)   -PJ     Row Name 04/11/22 1232          Vital Signs    Pre SpO2 (%) 95 (P)   -PJ     O2 Delivery Pre Treatment room air (P)   -PJ     Intra SpO2 (%) 91 (P)   -PJ     O2 Delivery Intra Treatment room air (P)   -PJ     Post SpO2 (%) 95 (P)   -PJ     O2 Delivery Post Treatment room air (P)   -PJ     Row Name 04/11/22 1232          Positioning and Restraints    Post Treatment Position chair (P)   -PJ     In Chair reclined;call light within reach (P)   -PJ     Row Name 04/11/22 1232          Progress Summary (PT)    Progress Toward Functional Goals (PT) progress toward functional goals as expected (P)   -PJ     Daily Progress Summary (PT) Pt tolerating more activity on room air, SpO2 maintaining above 90% with ambulation and therapeutic exercises. Neuropathic pain in Bilateral plantar feet and fatigue limited ambulation distance. Pt still demonstrates shuffling, slow christine with ambulation. Continue with POC. (P)   -PJ           User Key  (r) = Recorded By, (t) = Taken By, (c) = Cosigned By    Initials Name Provider Type    Wily Verma, PT Student PT Student                Physical Therapy Education                 Title: PT OT SLP Therapies (Done)     Topic: Physical Therapy (Done)     Point: Mobility training (Done)     Learning Progress Summary           Patient Acceptance, E,TB, VU,NR by AT at 4/9/2022 2152    Acceptance, E, VU by TK at 4/6/2022 1138                   Point: Home exercise program (Done)     Learning Progress Summary           Patient Acceptance, E,TB, VU,NR by AT at 4/9/2022 2152    Acceptance, E, VU by TK at 4/6/2022 1138                   Point: Body mechanics (Done)     Learning Progress Summary           Patient Acceptance, E,TB, VU,NR by AT at 4/9/2022 2152    Acceptance, E, VU by TK at 4/6/2022 1138                   Point: Precautions (Done)     Learning Progress  Summary           Patient Acceptance, E,TB, VU,NR by AT at 4/9/2022 2152    Acceptance, E, VU by TK at 4/6/2022 1138                               User Key     Initials Effective Dates Name Provider Type Discipline    AT 06/16/21 -  Yessica Santiago, RN Registered Nurse Nurse    TK 02/09/22 -  Alber Dickson, PT Student PT Student PT              PT Recommendation and Plan     Progress Summary (PT)  Progress Toward Functional Goals (PT): (P) progress toward functional goals as expected  Daily Progress Summary (PT): (P) Pt tolerating more activity on room air, SpO2 maintaining above 90% with ambulation and therapeutic exercises. Neuropathic pain in Bilateral plantar feet and fatigue limited ambulation distance. Pt still demonstrates shuffling, slow christine with ambulation. Continue with POC.   Outcome Measures     Row Name 04/11/22 1200 04/10/22 1300          How much help from another person do you currently need...    Turning from your back to your side while in flat bed without using bedrails? 4 (P)   -PJ 3  -CS     Moving from lying on back to sitting on the side of a flat bed without bedrails? 3 (P)   -PJ 3  -CS     Moving to and from a bed to a chair (including a wheelchair)? 3 (P)   -PJ 3  -CS     Standing up from a chair using your arms (e.g., wheelchair, bedside chair)? 3 (P)   -PJ 3  -CS     Climbing 3-5 steps with a railing? 2 (P)   -PJ 2  -CS     To walk in hospital room? 3 (P)   -PJ 3  -CS     AM-PAC 6 Clicks Score (PT) 18 (P)   -PJ 17  -CS            Functional Assessment    Outcome Measure Options AM-PAC 6 Clicks Basic Mobility (PT) (P)   -PJ AM-PAC 6 Clicks Basic Mobility (PT)  -CS           User Key  (r) = Recorded By, (t) = Taken By, (c) = Cosigned By    Initials Name Provider Type    CS Reilly Pond PTA Physical Therapist Assistant    Wily Verma, PT Student PT Student                 Time Calculation:    PT Charges     Row Name 04/11/22 1236             Time Calculation    PT Received On  04/11/22 (P)   -PJ              Timed Charges    96257 - PT Therapeutic Exercise Minutes 15 (P)   -PJ      06644 - Gait Training Minutes  4 (P)   -PJ      09550 - PT Therapeutic Activity Minutes 6 (P)   -PJ              Total Minutes    Timed Charges Total Minutes 25 (P)   -PJ       Total Minutes 25 (P)   -PJ            User Key  (r) = Recorded By, (t) = Taken By, (c) = Cosigned By    Initials Name Provider Type    PJ Wily Nguyen, PT Student PT Student              Therapy Charges for Today     Code Description Service Date Service Provider Modifiers Qty    00724802492 HC PT THER PROC EA 15 MIN 4/11/2022 Wily Nguyen, PT Student GP 1    89924380085 HC PT THERAPEUTIC ACT EA 15 MIN 4/11/2022 Wily Nguyen, PT Student GP 1          PT G-Codes  Outcome Measure Options: (P) AM-PAC 6 Clicks Basic Mobility (PT)  AM-PAC 6 Clicks Score (PT): (P) 18  AM-PAC 6 Clicks Score (OT): 16    Wily Nguyen, PT Student  4/11/2022

## 2022-04-11 NOTE — DISCHARGE SUMMARY
[facility]         HOSPITALIST  DISCHARGE SUMMARY    Patient Name: Preston Wallis  : 1965  MRN: [id number]    Date of Admission: 2022  Date of Discharge:  22  Primary Care Physician: Kimmy Riley MD    Consults     Date and Time Order Name Status Description    2022  7:49 AM Inpatient Pulmonology Consult      2022  7:13 PM Hospitalist (on-call MD unless specified)            Active and Resolved Hospital Problems:  Active Hospital Problems    Diagnosis POA   • Cytokine release syndrome, grade 3 [D89.833] No   • Elevated troponin [R77.8] Unknown   • Type 2 diabetes mellitus with hyperglycemia (HCC) [E11.65] Unknown   • Obesity (BMI 30-39.9) [E66.9] Unknown   • Chronic anticoagulation [Z79.01] Not Applicable   • Paroxysmal atrial fibrillation (HCC) [I48.0] Yes   • Essential (primary) hypertension [I10] Yes   • Multifocal pneumonia [J18.9] Yes   • COPD with exacerbation (HCC) [J44.1] Yes      Resolved Hospital Problems    Diagnosis POA   • **Acute on chronic respiratory failure with hypoxia (HCC) [J96.21] Unknown   • Acute respiratory failure with hypercapnia (HCC) [J96.02] Unknown   • Therapeutic drug monitoring [Z51.81] Not Applicable   • Acute hypoxemic respiratory failure due to COVID-19 (HCC) [U07.1, J96.01] Yes       Hospital Course     Hospital Course:  Preston Wallis is a 56 y.o. male chronic obstructive pulmonary disease, sleep apnea, noncompliance with CPAP/inhalers, chronic kidney disease stage III, type 2 diabetes with complications who presented with chief complaint of shortness of breath.  On presentation, oxygen saturation was 89% on 4 L with increased work of breathing and wheezing.  CT of the chest notable for changes of bronchiectasis with surrounding consolidation and pulmonary edema.  Infectious work-up obtained.  Had a history of previous MRSA and Pseudomonas pneumonia.  Started on IV broad-spectrum antibiotics, tobramycin nebulizers, steroids,  diuretics.  Found to be Covid positive.  Not a candidate for Actemra.  Started on remdesivir and completed 5 days.  Liver enzymes remained stable and creatinine remained near baseline.  He did improve with medical management and was weaned to room air.  Was unable to produce sputum specimen, transitioned to doxycycline to complete empiric course.  Transitioned to prednisone with rapid taper per pulmonology recommendations. Counseled on importance of medication compliance as well as NIPPV use at home and RT  will assist with arranging. Discharge medications were discussed. Follow-up was arranged with family doctor as well as pulmonologist.  Discharged in stable condition    Day of Discharge     Vital Signs:  Temp:  [97.7 °F (36.5 °C)-98.8 °F (37.1 °C)] 97.7 °F (36.5 °C)  Heart Rate:  [70-85] 70  Resp:  [18-24] 18  BP: (151-175)/(58-79) 151/61  Flow (L/min):  [2] 2  Physical Exam:   GENERAL: The patient is conversant and nontoxic.  HEENT: PERRLA, EOMI. Oropharynx clear. MMM  NECK: Supple, trachea midline  HEART: Regular rate and rhythm without murmurs.  LUNGS: Equal aeration bilaterally, mild expiratory wheezing, nonlabored respiration  ABDOMEN: Soft, positive bowel sounds, nontender, nondistended  SKIN: No rash or open wounds   NEUROLOGIC: Alert, cranial nerves grossly intact    Discharge Details        Discharge Medications      New Medications      Instructions Start Date   arformoterol 15 MCG/2ML nebulizer solution  Commonly known as: BROVANA   15 mcg, Nebulization, 2 Times Daily - RT      budesonide 0.5 MG/2ML nebulizer solution  Commonly known as: PULMICORT   0.5 mg, Nebulization, 2 Times Daily - RT      doxycycline 100 MG capsule  Commonly known as: VIBRAMYCIN   100 mg, Oral, 2 Times Daily      predniSONE 20 MG tablet  Commonly known as: DELTASONE   Take 2 tablets by mouth Daily for 2 days, THEN 1.5 tablets Daily for 2 days, THEN 1 tablet Daily for 2 days, THEN 0.5 tablets Daily for 2 days.   Start  Date: April 12, 2022        Changes to Medications      Instructions Start Date   apixaban 5 MG tablet tablet  Commonly known as: Eliquis  What changed:   how much to take  how to take this  when to take this  additional instructions   take 2 tablets BY MOUTH EVERY DAY      potassium chloride 10 MEQ CR tablet  What changed:   how much to take  how to take this  when to take this   Take 1 po 3 days a week with lasix.      Vitamin D3 50 MCG (2000 UT) tablet  What changed: See the new instructions.   Take 1 tablet BY MOUTH EVERY MORNING FOR vitamin deficiency         Continue These Medications      Instructions Start Date   acetaminophen 500 MG tablet  Commonly known as: TYLENOL   1,000 mg, Oral, Every 8 Hours PRN      ADMELOG SOLOSTAR SC   Subcutaneous, Per SSI, if BS <150 = 0 units, 151-180= 1 unit, 181-210= 2units, 211-240= 3units, 241-270= 4units, 271-300= 5units, 301-330=6 untis, 331-390= 8units, <390 give 9units      albuterol sulfate  (90 Base) MCG/ACT inhaler  Commonly known as: PROVENTIL HFA;VENTOLIN HFA;PROAIR HFA   2 puffs, Inhalation, 4 Times Daily PRN      aspirin 81 MG EC tablet   81 mg, Oral, Daily      atorvastatin 20 MG tablet  Commonly known as: LIPITOR   20 mg, Oral, Nightly      B-D UF III MINI PEN NEEDLES 31G X 5 MM misc  Generic drug: Insulin Pen Needle   USE AS DIRECTED WITH INSULIN      Blood Glucose Monitoring Suppl w/Device kit   1 kit, Does not apply, Take As Directed, Dx e11.9      Bydureon BCise 2 MG/0.85ML auto-injector injection  Generic drug: exenatide er   2 mg, Subcutaneous, Weekly      calcium citrate 950 (200 Ca) MG tablet  Commonly known as: CALCITRATE   TAKE 1 TABLET BY MOUTH EVERY DAY      dilTIAZem  MG 24 hr capsule  Commonly known as: CARDIZEM CD   120 mg, Oral, Daily      docusate sodium 100 MG capsule  Commonly known as: COLACE   TAKE 1 CAPSULE BY MOUTH TWICE DAILY      DULoxetine 60 MG capsule  Commonly known as: CYMBALTA   60 mg, Oral, 2 Times Daily       famotidine 40 MG tablet  Commonly known as: PEPCID   40 mg, Oral, 2 Times Daily      Farxiga 5 MG tablet tablet  Generic drug: dapagliflozin   5 mg, Oral, Daily      ferrous gluconate 324 MG tablet  Commonly known as: FERGON   324 mg, Oral, Daily With Breakfast      furosemide 40 MG tablet  Commonly known as: LASIX   40 mg, Oral, Daily      gabapentin 300 MG capsule  Commonly known as: NEURONTIN   300 mg, Oral, Every Night at Bedtime      glucose blood test strip   1 each, Other, 4 Times Daily With Meals & Nightly, Use as instructed dx e 11.9      guaifenesin-dextromethorphan  MG tablet sustained-release 12 hour tablet   1 tablet, Oral, 2 Times Daily PRN      hydrALAZINE 25 MG tablet  Commonly known as: APRESOLINE   25 mg, Oral, 2 Times Daily      Insulin Glargine 100 UNIT/ML injection pen  Commonly known as: LANTUS SOLOSTAR   40 Units, Subcutaneous, Daily      ipratropium-albuterol 0.5-2.5 mg/3 ml nebulizer  Commonly known as: DUO-NEB   3 mL, Nebulization, Every 4 Hours PRN      magnesium oxide 400 (241.3 Mg) MG tablet tablet  Commonly known as: MAGOX   400 mg, Oral, 2 Times Daily      metoprolol tartrate 100 MG tablet  Commonly known as: LOPRESSOR   100 mg, Oral, 2 Times Daily      O2  Commonly known as: OXYGEN   2 Liter O2 - CONTINUOUS (route: Oxygen)      tamsulosin 0.4 MG capsule 24 hr capsule  Commonly known as: FLOMAX   0.4 mg, Oral, Every Evening      traZODone 100 MG tablet  Commonly known as: DESYREL   100 mg, Oral, Nightly      TRUEplus Lancets 28G misc   USE AS DIRECTED         Stop These Medications    NIFEdipine XL 30 MG 24 hr tablet  Commonly known as: Procardia XL            Allergies   Allergen Reactions   • Benadryl [Diphenhydramine] Itching   • Proventil [Albuterol] Other (See Comments)     Mouth sores         Discharge Disposition:  Home-Health Care [facility]    Diet:  Hospital:  Diet Order   Procedures   • Diet Regular; Consistent Carbohydrate       Discharge Activity:   Activity Instructions      Activity as Tolerated            CODE STATUS:  Code Status and Medical Interventions:   Ordered at: 04/05/22 1919     Level Of Support Discussed With:    Patient     Code Status (Patient has no pulse and is not breathing):    CPR (Attempt to Resuscitate)     Medical Interventions (Patient has pulse or is breathing):    Full Support         Future Appointments   Date Time Provider Department Center   6/30/2022  1:30 PM Kimmy Riley MD [facility]       Additional Instructions for the Follow-ups that You Need to Schedule     Ambulatory Referral to Cardiac Rehab   As directed      Phase I and II pulmonary rehab   Dx: COVID WITH HX OF COPD J44.9    Order Comments: Phase I and II pulmonary rehab Dx: COVID WITH HX OF COPD J44.9          Discharge Follow-up with PCP   As directed       Currently Documented PCP:    Kimmy Riley MD    PCP Phone Number:    [phone]     Follow Up Details: 1 week         Discharge Follow-up with Specified Provider: Dr Carolina; 2 Weeks   As directed      To: Dr Carolina    Follow Up: 2 Weeks               Pertinent  and/or Most Recent Results     PROCEDURES: NONE    IMAGING:  CT Chest Without Contrast Diagnostic    Result Date: 4/5/2022  PROCEDURE: CT CHEST WO CONTRAST DIAGNOSTIC  COMPARISON: [facility], CT, CHEST W/ CONTRAST, 9/30/2020, 12:01.  INDICATIONS: Bilateral airspace disease.  TECHNIQUE: CT images were created without the administration of contrast material.   PROTOCOL:   Standard imaging protocol performed    RADIATION:   DLP: 731mGy*cm   Automated exposure control was utilized to minimize radiation dose.  FINDINGS:  The visualized soft tissue structures at the base of the neck including the thyroid appear within normal limits.  There is no lower cervical or axillary adenopathy.  There is a left-sided chest port with tip terminating in the SVC.  The heart is enlarged.  There is no pericardial effusion.  The aorta is normal in caliber without  evidence of aneurysm formation.  The main pulmonary artery appears normal in caliber.  There are partially calcified AP window lymph nodes likely related to chronic granulomatous disease.  There is an enlarged right paratracheal lymph node measuring up to 1.4 cm in short axis, increased from the prior examination.  This may be reactive to the underlying pulmonary parenchymal disease.  There is abnormal central bronchiectasis involving the bilateral upper and bilateral lower lobes.  There are small bilateral pleural effusions which appear new from the prior examination.  There is smooth interlobular septal thickening likely related to interstitial edema pattern.  There are areas of consolidation surrounding the bronchiectasis suspicious for superimposed pneumonia.  The esophagus is normal in course and caliber.  Visualized portions of the upper abdomen demonstrate postcholecystectomy changes.  There is paravertebral bridging ossification compatible with diffuse idiopathic skeletal hyperostosis.  There is an old left posterior 8th rib fracture.        1. Cardiomegaly with small bilateral pleural effusions, smooth interlobular septal thickening and ground-glass opacities likely representing pulmonary edema pattern. 2. Central bronchiectasis involving both lungs with surrounding consolidation likely representing superimposed infection. 3. Enlarged right paratracheal lymph node, likely reactive to underlying pulmonary disease.      ORIANA WHITLEY MD       Electronically Signed and Approved By: ORIANA WHITLEY MD on 4/05/2022 at 20:29             XR Chest 1 View    Result Date: 4/10/2022  PROCEDURE: XR CHEST 1 VW  COMPARISON: [facility], CT, CT CHEST WO CONTRAST DIAGNOSTIC, 4/05/2022, 19:53.  [facility], CR, XR CHEST 1 VW, 4/05/2022, 15:35.  INDICATIONS: Follow-up pneumonia, Shortness of Breath; Weakness - Generalized  FINDINGS:  The heart is unchanged in size.  Bilateral airspace disease is  improved in comparison to 4/5/2022.  Left subclavian Port-A-Cath device is in unchanged position.        Bilateral airspace disease is improved in comparison to 4/5/2022.       CASPER LONG MD       Electronically Signed and Approved By: CASPER LONG MD on 4/10/2022 at 16:09             XR Chest 1 View    Result Date: 4/5/2022  PROCEDURE: XR CHEST 1 VW  COMPARISON: [facility], CHEST PA/AP & LAT 2V, 10/13/2020, 15:42.  [facility], CR, CHEST PA/AP & LAT 2V, 12/01/2020, 11:50.  INDICATIONS: short of breath  FINDINGS:  The patient is rotated.  There is a left-sided chest port with tip terminating at the upper SVC.  There is cardiomegaly.  There is persistent right upper lobe airspace opacity when compared to the prior examinations.  There are streaky interstitial opacities throughout the left lung.  Findings are concerning for multifocal pneumonia.  There is a probable right-sided pleural effusion.  There is no pneumothorax.  There are degenerative changes of the thoracic spine.        1. Persistent right upper lobe airspace opacity representing focal infection or mass. 2. Diffuse interstitial opacities within the left lung likely representing multifocal pneumonia. 3. Probable small right-sided pleural effusion.       ORIANA WHITLEY MD       Electronically Signed and Approved By: ORIANA WHITLEY MD on 4/05/2022 at 15:45             Adult Transthoracic Echo Limited W/ Cont if Necessary Per Protocol    Result Date: 4/11/2022  · The aortic root measures 3.1 cm. · Left ventricular ejection fraction appears to be 56 - 60%. · Left ventricular diastolic function is consistent with (grade I) impaired relaxation.  There were no apparent intracardiac masses, vegetations or thrombi.       LAB RESULTS:      Lab 04/11/22  0434 04/10/22  0520 04/09/22  0433 04/09/22  0432 04/08/22  0434 04/07/22  0442 04/06/22  0321 04/05/22  2240 04/05/22  1933 04/05/22  1529   WBC 13.86* 14.13*  --  13.33*  12.92* 16.50*  --  13.49*  --  14.56*   HEMOGLOBIN 11.2* 10.7*  --  10.5* 10.2* 9.1*  --  10.2*  --  10.0*   HEMATOCRIT 35.0* 33.7*  --  33.1* 32.2* 28.7*  --  33.4*  --  32.7*   PLATELETS 406 416  --  405 392 383  --  346  --  340   NEUTROS ABS  --   --   --   --   --  14.78*  --  12.57*  --  11.74*   IMMATURE GRANS (ABS)  --   --   --   --   --  0.15*  --  0.14*  --  0.25*   LYMPHS ABS  --   --   --   --   --  0.73  --  0.56*  --  1.27   MONOS ABS  --   --   --   --   --  0.83  --  0.18  --  1.18*   EOS ABS  --   --   --   --   --  0.00  --  0.01  --  0.06   MCV 86.2 84.9  --  87.6 88.0 87.8  --  91.3  --  91.6   CRP  --   --  3.34*  --   --  9.73* 16.36*  --   --   --    PROCALCITONIN  --   --   --   --   --   --  0.29*  --   --   --    LACTATE  --   --   --   --   --   --   --   --  1.2  --          Lab 04/11/22  0434 04/10/22  05 04/09/22 0433 04/08/22 0434 04/07/22 0442   SODIUM 135* 132* 130* 135* 130*   POTASSIUM 4.1 4.3 4.7 4.5 4.9   CHLORIDE 96* 94* 93* 95* 91*   CO2 28.4 26.6 27.4 26.9 25.4   ANION GAP 10.6 11.4 9.6 13.1 13.6   BUN 62* 61* 57* 59* 58*   CREATININE 1.89* 2.05* 1.88* 2.21* 2.30*   EGFR 41.1* 37.3* 41.4* 34.1* 32.5*   GLUCOSE 275* 290* 310* 288* 340*   CALCIUM 9.1 9.2 9.1 9.4 9.0         Lab 04/11/22  0434 04/10/22  0520 04/09/22  0433 04/08/22  0434 04/07/22  0442 04/05/22  1529   TOTAL PROTEIN 7.1 7.5 7.5 7.6 7.4 8.0   ALBUMIN 3.50 3.40* 3.50 3.40* 3.20* 3.70   GLOBULIN 3.6 4.1 4.0 4.2  --  4.3   ALT (SGPT) 20 19 17 15 14 14   AST (SGOT) 18 18 18 16 17 14   BILIRUBIN 0.2 0.2 0.2 <0.2 0.2 0.4   BILIRUBIN DIRECT  --   --   --   --  <0.2  --    ALK PHOS 141* 146* 154* 160* 176* 181*         Lab 04/06/22  0321 04/05/22  2240 04/05/22  1529   PROBNP  --   --  1,966.0*   TROPONIN T 0.130* 0.142* 0.134*             Lab 04/05/22  2206   IRON 22*   IRON SATURATION 7*   TIBC 304   TRANSFERRIN 204   FERRITIN 146.80         Lab 04/07/22  0625   PH, ARTERIAL 7.259*   PCO2, ARTERIAL 65.1*   PO2 ART  86.1   O2 SATURATION ART 94.5*   HCO3 ART 28.5*   BASE EXCESS ART 0.5   CARBOXYHEMOGLOBIN 0.3     Brief Urine Lab Results  (Last result in the past 365 days)      Color   Clarity   Blood   Leuk Est   Nitrite   Protein   CREAT   Urine HCG        07/27/21 1430             35.9             Microbiology Results (last 10 days)     Procedure Component Value - Date/Time    Legionella Antigen, Urine - Urine, Urine, Clean Catch [[id number]]  (Normal) Collected: 04/06/22 1540    Lab Status: Final result Specimen: Urine, Clean Catch Updated: 04/06/22 1644     LEGIONELLA ANTIGEN, URINE Negative    S. Pneumo Ag Urine or CSF - Urine, Urine, Clean Catch [[id number]]  (Normal) Collected: 04/06/22 1540    Lab Status: Final result Specimen: Urine, Clean Catch Updated: 04/06/22 1645     Strep Pneumo Ag Negative    Respiratory Panel PCR w/COVID-19(SARS-CoV-2) [facility]/ROBERTA/OCHOA/PAD/COR/MAD/LUIS In-House, NP Swab in UTM/VTM, 3-4 HR TAT - Swab, Nasopharynx [[id number]]  (Abnormal) Collected: 04/06/22 1102    Lab Status: Final result Specimen: Swab from Nasopharynx Updated: 04/06/22 1231     ADENOVIRUS, PCR Not Detected     Coronavirus 229E Not Detected     Coronavirus HKU1 Not Detected     Coronavirus NL63 Not Detected     Coronavirus OC43 Not Detected     COVID19 Detected     Human Metapneumovirus Not Detected     Human Rhinovirus/Enterovirus Not Detected     Influenza A PCR Not Detected     Influenza B PCR Not Detected     Parainfluenza Virus 1 Not Detected     Parainfluenza Virus 2 Not Detected     Parainfluenza Virus 3 Not Detected     Parainfluenza Virus 4 Not Detected     RSV, PCR Not Detected     Bordetella pertussis pcr Not Detected     Bordetella parapertussis PCR Not Detected     Chlamydophila pneumoniae PCR Not Detected     Mycoplasma pneumo by PCR Not Detected    Narrative:      In the setting of a positive respiratory panel with a viral infection PLUS a negative procalcitonin without other underlying concern for bacterial infection,  consider observing off antibiotics or discontinuation of antibiotics and continue supportive care. If the respiratory panel is positive for atypical bacterial infection (Bordetella pertussis, Chlamydophila pneumoniae, or Mycoplasma pneumoniae), consider antibiotic de-escalation to target atypical bacterial infection.    MRSA Screen, PCR (Inpatient) - Swab, Nares [[id number]]  (Abnormal) Collected: 04/06/22 1102    Lab Status: Final result Specimen: Swab from Nares Updated: 04/06/22 1423     MRSA PCR MRSA Detected    Mycoplasma Pneumoniae Antibody, IgM - Blood, [[id number]]  (Normal) Collected: 04/05/22 2230    Lab Status: Final result Specimen: Blood Updated: 04/06/22 1109     Mycoplasma pneumo IgM Negative    COVID-19,APTIMA PANTHER(DEBRA),[facility], NP/OP SWAB IN UTM/VTM/SALINE TRANSPORT MEDIA,24 HR TAT - Swab, Nasopharynx [[id number]]  (Normal) Collected: 04/05/22 1938    Lab Status: Final result Specimen: Swab from Nasopharynx Updated: 04/06/22 0552     COVID19 Not Detected    Narrative:      Fact sheet for providers: https://www.fda.gov/media/701997/download     Fact sheet for patients: https://www.fda.gov/media/906119/download    Test performed by RT PCR.    Blood Culture - Blood, Arm, Left [[id number]]  (Normal) Collected: 04/05/22 1933    Lab Status: Final result Specimen: Blood from Arm, Left Updated: 04/10/22 1947     Blood Culture No growth at 5 days    Blood Culture - Blood, Arm, Left [[id number]]  (Normal) Collected: 04/05/22 1933    Lab Status: Final result Specimen: Blood from Arm, Left Updated: 04/10/22 1947     Blood Culture No growth at 5 days          CT Chest Without Contrast Diagnostic    Result Date: 4/5/2022  Impression:   1. Cardiomegaly with small bilateral pleural effusions, smooth interlobular septal thickening and ground-glass opacities likely representing pulmonary edema pattern. 2. Central bronchiectasis involving both lungs with surrounding consolidation likely representing superimposed  infection. 3. Enlarged right paratracheal lymph node, likely reactive to underlying pulmonary disease.      ORIANA WHITLEY MD       Electronically Signed and Approved By: ORIANA WHITLEY MD on 4/05/2022 at 20:29             XR Chest 1 View    Result Date: 4/10/2022  Impression:   Bilateral airspace disease is improved in comparison to 4/5/2022.       CASPER LONG MD       Electronically Signed and Approved By: CASPER LONG MD on 4/10/2022 at 16:09             XR Chest 1 View    Result Date: 4/5/2022  Impression:   1. Persistent right upper lobe airspace opacity representing focal infection or mass. 2. Diffuse interstitial opacities within the left lung likely representing multifocal pneumonia. 3. Probable small right-sided pleural effusion.       ORIANA WHITLEY MD       Electronically Signed and Approved By: ORIANA WHITLEY MD on 4/05/2022 at 15:45                       Results for orders placed during the hospital encounter of 04/05/22    Adult Transthoracic Echo Limited W/ Cont if Necessary Per Protocol    Interpretation Summary  · The aortic root measures 3.1 cm.  · Left ventricular ejection fraction appears to be 56 - 60%.  · Left ventricular diastolic function is consistent with (grade I) impaired relaxation.    There were no apparent intracardiac masses, vegetations or thrombi.      Labs Pending at Discharge:  Pending Labs     Order Current Status    Vancomycin, Trough This is a timed trough level. Make sure vancomycin is not infusing when this level is collected In process            Time spent on Discharge including face to face service: >30 minutes    Electronically signed by Leonard Multani DO, 04/11/22, 2:23 PM EDT.

## 2022-04-11 NOTE — SIGNIFICANT NOTE
04/11/22 1452   Plan   Final Discharge Disposition Code 06 - home with home health care   Final Note Pt already has home oxygen through [employer]. SW notified [facility] of pts discharge.

## 2022-04-11 NOTE — DISCHARGE INSTR - APPOINTMENTS
Please wear a mask to appointment on 4/18/22    Follow-up appointment with Dr. Carolina 4/26/22 9am

## 2022-04-11 NOTE — CONSULTS
Nutrition Services    Patient Name:  Preston Wallis  YOB: 1965  MRN: [id number]  Admit Date:  4/5/2022    Following for length of stay day 6. Pt has 100% meal intake. Sodium remains low but is improving. Wt is stable. No nutrition intervention indicated at this time. Will continue to monitor and follow per protocol.    Electronically signed by:  Kyra Bee RD  04/11/22 08:38 EDT

## 2022-04-11 NOTE — PROGRESS NOTES
"PHARMACY TO DOSE VANCOMYCIN DAY: 6  DURATION OF THERAPY: 4-13-22    INDICATION: PNEUMONIA  GOAL AUC: 400-600 MG/L.HR    HT: 175.3 cm (69\")      04/05/22 2151      Weight: 123 kg (270 lb 8.1 oz)        Estimated Creatinine Clearance: 56.5 mL/min (A) (by C-G formula based on SCr of 1.89 mg/dL (H)).  HD/CRRT/PD? NO  CONTRAST ADMINISTERED? LUMASON on 4-9-22  I/O last 3 completed shifts:  In: 1300 [P.O.:1200; IV Piggyback:100]  Out: 36651 [Urine:19873]    WBC: 14.13  TMAX: AF    Microbiology Results (last 10 days)       Procedure  Component  Value  -  Date/Time    Legionella Antigen, Urine - Urine, Urine, Clean Catch [[id number]]  (Normal)  Collected: 04/06/22 1540    Lab Status: Final result  Specimen: Urine, Clean Catch  Updated: 04/06/22 1644      LEGIONELLA ANTIGEN, URINE  Negative    S. Pneumo Ag Urine or CSF - Urine, Urine, Clean Catch [[id number]]  (Normal)  Collected: 04/06/22 1540    Lab Status: Final result  Specimen: Urine, Clean Catch  Updated: 04/06/22 1645      Strep Pneumo Ag  Negative    Respiratory Panel PCR w/COVID-19(SARS-CoV-2) OSMIN/ROBERTA/OCHOA/PAD/COR/MAD/LUIS In-House, NP Swab in UTM/VTM, 3-4 HR TAT - Swab, Nasopharynx [[id number]]  (Abnormal)  Collected: 04/06/22 1102    Lab Status: Final result  Specimen: Swab from Nasopharynx  Updated: 04/06/22 1231      ADENOVIRUS, PCR  Not Detected      Coronavirus 229E  Not Detected      Coronavirus HKU1  Not Detected      Coronavirus NL63  Not Detected      Coronavirus OC43  Not Detected      COVID19  Detected      Human Metapneumovirus  Not Detected      Human Rhinovirus/Enterovirus  Not Detected      Influenza A PCR  Not Detected      Influenza B PCR  Not Detected      Parainfluenza Virus 1  Not Detected      Parainfluenza Virus 2  Not Detected      Parainfluenza Virus 3  Not Detected      Parainfluenza Virus 4  Not Detected      RSV, PCR  Not Detected      Bordetella pertussis pcr  Not Detected      Bordetella parapertussis PCR  Not Detected      Chlamydophila " pneumoniae PCR  Not Detected      Mycoplasma pneumo by PCR  Not Detected    Narrative:      In the setting of a positive respiratory panel with a viral infection PLUS a negative procalcitonin without other underlying concern for bacterial infection, consider observing off antibiotics or discontinuation of antibiotics and continue supportive care. If the respiratory panel is positive for atypical bacterial infection (Bordetella pertussis, Chlamydophila pneumoniae, or Mycoplasma pneumoniae), consider antibiotic de-escalation to target atypical bacterial infection.    MRSA Screen, PCR (Inpatient) - Swab, Nares [[id number]]  (Abnormal)  Collected: 04/06/22 1102    Lab Status: Final result  Specimen: Swab from Nares  Updated: 04/06/22 1423      MRSA PCR  MRSA Detected    Mycoplasma Pneumoniae Antibody, IgM - Blood, [[id number]]  (Normal)  Collected: 04/05/22 2230    Lab Status: Final result  Specimen: Blood  Updated: 04/06/22 1109      Mycoplasma pneumo IgM  Negative    COVID-19,APTIMA PANTHER(DEBRA),[facility], NP/OP SWAB IN UTM/VTM/SALINE TRANSPORT MEDIA,24 HR TAT - Swab, Nasopharynx [[id number]]  (Normal)  Collected: 04/05/22 1938    Lab Status: Final result  Specimen: Swab from Nasopharynx  Updated: 04/06/22 0552      COVID19  Not Detected    Narrative:      Fact sheet for providers: https://www.fda.gov/media/120446/download     Fact sheet for patients: https://www.fda.gov/media/503965/download    Test performed by RT PCR.    Blood Culture - Blood, Arm, Left [[id number]]  (Normal)  Collected: 04/05/22 1933    Lab Status: Final result  Specimen: Blood from Arm, Left  Updated: 04/10/22 1947      Blood Culture  No growth at 5 days    Blood Culture - Blood, Arm, Left [[id number]]  (Normal)  Collected: 04/05/22 1933    Lab Status: Final result  Specimen: Blood from Arm, Left  Updated: 04/10/22 1947      Blood Culture  No growth at 5 days         04/05/22 2032  CT Chest    Cardiomegaly with small bilateral pleural effusions,  smooth interlobular septal thickening and ground-glass opacities likely representing pulmonary edema pattern. Central bronchiectasis involving both lungs with surrounding consolidation likely representing superimposed infection. Enlarged right paratracheal lymph node, likely reactive to underlying pulmonary disease.     04/05/22 1549  XR Chest    Persistent right upper lobe airspace opacity representing focal infection or mass. Diffuse interstitial opacities within the left lung likely representing multifocal pneumonia. Probable small right-sided pleural effusion.    OTHER ANTIMICROBIAL THERAPY: Cefepime, tobramycin nebs    ASSESSMENT / PLAN:  Lab Results   Component Value Date    VANCOTROUGH 18.88 04/11/2022    VANCORANDOM 12.45 04/07/2022     Regimen: 1000 mg IV every 24 hours.  Start time: 15:55 on 04/11/2022  Exposure target: AUC24 (range)400-600 mg/L.hr   AUC24,ss: 571 mg/L.hr  PAUC*: 100 %  Ctrough,ss: 18.9 mg/L  Pconc*: 28 %  Tox.: 15 %    Vancomycin trough returned at 18.88 mcg/mL  Vancomycin is scheduled to stop after 2 more doses. Will continue with this dose.  If vancomycin is continued past 2 more doses would recommend repeating trough and consider decreasing dose.    Labs ordered: BMP ordered for a.m.

## 2022-04-11 NOTE — CASE MANAGEMENT/SOCIAL WORK
RT CM consulted to arrange home NIV for pt with chronic respiratory failure secondary to COPD, PCO2 65.  Pt has tried and failed bilevel therapy as he was unable to tolerate at home.  Pt has had positIive response to NIPPV treatment while in hospitalized and agrees to try NIV with Astral.  Pt will need to continue non invasive ventilaltion at home on discharge to optimize pulmonary status/ventilation. Bilevel cannot provide target minute or tidal volume necessary to assure adequate ventilation to lessen/prevent hypercarbia.   Failure to to continue NIV would result in exacerbation of his clinically severe (FEV1 of 21% in 2008) COPD leading to rehospitalization.

## 2022-04-11 NOTE — OUTREACH NOTE
Prep Survey    Flowsheet Row Responses   [facility] discharged from? [facility]   Is LACE score < 7 ? No   Emergency Room discharge w/ pulse ox? No   Eligibility TCM   Hospital [facility]   Date of Admission 04/05/22   Date of Discharge 04/11/22   Discharge Disposition Home-Health Care S[facility]   Discharge diagnosis A/C hypoxic resp failure d/t COVID-19, multifocal PNA, COPD with exacerbation, T2DM   Does the patient have one of the following disease processes/diagnoses(primary or secondary)? COVID-19   Does the patient have Home health ordered? Yes   What is the Home health agency?  [facility]   Is there a DME ordered? Yes   What DME was ordered? has home O2, CPAP from [employer] ordered   Prep survey completed? Yes          THOR RODARTE - Registered Nurse

## 2022-04-12 ENCOUNTER — TRANSITIONAL CARE MANAGEMENT TELEPHONE ENCOUNTER (OUTPATIENT)
Dept: CALL CENTER | Facility: HOSPITAL | Age: 57
End: 2022-04-12

## 2022-04-12 NOTE — CASE MANAGEMENT/SOCIAL WORK
"RT JAVIER received approval from [employer] for Budesonide but Arformoterol was denied based on the member requiring   \"at least a 14-day trial and therapeutic failure, allergy, contraindication or intolerance to one of the preferred agents with the same sub class: Serevent Diskus\"  Serevent diskus is in the LABA class.    RT CM phoned [employer] and requested a reconsideration based on the fact that the information submitted supported the fact that the patient had indeed previously been on a LABA. In fact, Mr Wallis has been on Dulera, which contains formoterol, Breo, which contains vilanterol, and prior to admission was taking Symbicort (LABA/ICS), which contains formoterol and budesonide.  RT CM explained that since the Budesonide was approved and the arformoterol was denied, Mr Wallis will now be without his previously approved LABA/ICS therapy.  He will need both medications in nebulized form in order to avoid double dosing budesonide.  Mr Wallis has documented stage IV COPD (FGB602% of predicted), GROUP D and his inspiratory flow is currently further compromised to due his current COVID infection, which makes performing an mdi correctly difficult.  Failure to provide Mr Wallis with LABA/LAMA ICS after hospitalization puts him extremely high risk of readmission.  RT CM will fax appeal request to [employer] as the representative stated at this time it would not be reconsidered and an appeal must be filed.     Member ID:  [id number]  Member : [id number]  Prior Authorization Reference Number: [id number]    "

## 2022-04-12 NOTE — OUTREACH NOTE
Call Center TCM Note    Flowsheet Row Responses   [facility] patient discharged from? [facility]   Does the patient have one of the following disease processes/diagnoses(primary or secondary)? COVID-19   COVID-19 underlying condition? COPD   TCM attempt successful? Yes   Call start time 1455   Call end time 1504   Discharge diagnosis A/C hypoxic resp failure d/t COVID-19, multifocal PNA, COPD with exacerbation, T2DM   Is patient permission given to speak with other caregiver? Yes   List who call center can speak with wife   Person spoke with today (if not patient) and relationship Patient and wife   Meds reviewed with patient/caregiver? Yes   Is the patient having any side effects they believe may be caused by any medication additions or changes? No   Does the patient have all medications ordered at discharge? No   What is keeping the patient from filling the prescriptions? Lost script/didn't receive   Nursing Interventions Nurse called pharmacy   [facility] and they are waiting for insurance approval for the Brovana   Is the patient taking all medications as directed (includes completed medication regime)? No   What is preventing the patient from taking all medications as directed? Other   Medication comments Wife states she will  budesonide.   Does the patient have a primary care provider?  Yes   Does the patient have an appointment with their PCP or specialist within 7 days of discharge? Yes   Has the patient kept scheduled appointments due by today? N/A   Comments Has appt on 4/18   What is the Home health agency?  [facility]   Has home health visited the patient within 72 hours of discharge? Call prior to 72 hours   What DME was ordered? has home O2, CPAP from [employer] ordered   Has all DME been delivered? Yes   Psychosocial issues? No   Did the patient receive a copy of their discharge instructions? Yes   Did the patient receive a copy  of COVID-19 specific instructions? Yes   Nursing interventions Reviewed instructions with patient   What is the patient's perception of their health status since discharge? Improving   Does the patient have any of the following symptoms? None   Pulse Ox monitoring Intermittent   Pulse Ox device source Patient   O2 Sat: education provided Sat levels   Is the patient/caregiver able to teach back steps to recovery at home? Set small, achievable goals for return to baseline health, Rest and rebuild strength, gradually increase activity, Eat a well-balance diet, Make a list of questions for provider's appointment   If the patient is a current smoker, are they able to teach back resources for cessation? Not a smoker   Is the patient/caregiver able to teach back the hierarchy of who to call/visit for symptoms/problems? PCP, Specialist, Home health nurse, Urgent Care, ED, 911 Yes   Is the patient able to teach back COPD zones? Yes   Nursing interventions Education provided on various zones   Patient reports what zone on this call? Green Zone   Green Zone Reports doing well, Breathing without shortness of breath, Appetite is good   Green Zone interventions: Take daily medications   TCM call completed? Yes   Wrap up additional comments States he is doing well.  Has new cpap and he already had oxygen at home.          Kelly Wang LPN    4/12/2022, 15:13 EDT

## 2022-04-13 ENCOUNTER — READMISSION MANAGEMENT (OUTPATIENT)
Dept: CALL CENTER | Facility: HOSPITAL | Age: 57
End: 2022-04-13

## 2022-04-13 NOTE — OUTREACH NOTE
COVID-19 Week 1 Survey    Flowsheet Row Responses   [facility] patient discharged from? [facility]   Does the patient have one of the following disease processes/diagnoses(primary or secondary)? COVID-19   COVID-19 underlying condition? COPD   Call Number Call 2   Week 1 Call successful? Yes   Call start time 0857   Call end time 0909   Discharge diagnosis A/C hypoxic resp failure d/t COVID-19, multifocal PNA, COPD with exacerbation, T2DM   Is patient permission given to speak with other caregiver? Yes   List who call center can speak with wife   Person spoke with today (if not patient) and relationship patient   Meds reviewed with patient/caregiver? Yes   Does the patient have all medications ordered at discharge? No   What is keeping the patient from filling the prescriptions? --  [Patient reports that they have not gotten the new nebulizer meds but will check back with the pharmacy today. ]   Nursing Interventions Nurse provided patient education   Is the patient taking all medications as directed (includes completed medication regime)? No   What is preventing the patient from taking all medications as directed? Other  [See above. Patient taking his previous home meds and has been taking the antibiotic and steroid ordered. ]   Does the patient have a primary care provider?  Yes   Comments regarding PCP PCP Dr Riley. Appt 4/18/22   Does the patient have an appointment with their PCP or specialist within 7 days of discharge? Yes   Has the patient kept scheduled appointments due by today? N/A   What is the Home health agency?  [facility]   Has home health visited the patient within 72 hours of discharge? Call prior to 72 hours   Has all DME been delivered? Yes   DME comments Patient reports that he is not needing to wear the home O2 at this time.    Psychosocial issues? No   Did the patient receive a copy of their discharge instructions? Yes   Did the patient receive a copy of COVID-19 specific instructions? Yes    Nursing interventions Reviewed instructions with patient   What is the patient's perception of their health status since discharge? Improving   Does the patient have any of the following symptoms? Cough  [Occasional nonproductive cough. ]   Nursing Interventions Nurse provided patient education   Pulse Ox monitoring Intermittent   Pulse Ox device source Patient   O2 Sat comments Patient reports that he can check sats with his watch.    O2 Sat: education provided Sat levels, Monitoring frequency, When to seek care   O2 Sat education comments 98% on room air   Is the patient/caregiver able to teach back steps to recovery at home? Set small, achievable goals for return to baseline health, Rest and rebuild strength, gradually increase activity, Eat a well-balance diet   If the patient is a current smoker, are they able to teach back resources for cessation? Not a smoker   Is the patient/caregiver able to teach back the hierarchy of who to call/visit for symptoms/problems? PCP, Specialist, Home health nurse, Urgent Care, ED, 911 Yes   Patient reports what zone on this call? Green Zone   Green Zone Reports doing well, Breathing without shortness of breath, Sleeping well, Appetite is good, Usual activity and exercise level   Green Zone interventions: Avoid indoor/outdoor triggers, Take daily medications, Use oxygen as prescribed, Continue regular exercise/diet plan   COVID-19 call completed? Yes   Wrap up additional comments Patient reports that he is doing well. Denies any new questions or needs today.           ANDRES FANG - Registered Nurse

## 2022-04-14 ENCOUNTER — READMISSION MANAGEMENT (OUTPATIENT)
Dept: CALL CENTER | Facility: HOSPITAL | Age: 57
End: 2022-04-14

## 2022-04-14 NOTE — OUTREACH NOTE
COVID-19 Week 1 Survey    Flowsheet Row Responses   [facility] patient discharged from? [facility]   Does the patient have one of the following disease processes/diagnoses(primary or secondary)? COVID-19   COVID-19 underlying condition? COPD   Call Number Call 3   Week 1 Call successful? Yes   Call start time 1115   Call end time 1117   Discharge diagnosis A/C hypoxic resp failure d/t COVID-19, multifocal PNA, COPD with exacerbation, T2DM   Meds reviewed with patient/caregiver? Yes   Is the patient taking all medications as directed (includes completed medication regime)? Yes   Comments regarding PCP PCP Dr Riley. Appt 4/18/22   Has the patient kept scheduled appointments due by today? N/A   What is the patient's perception of their health status since discharge? Improving   Does the patient have any of the following symptoms? None   Pulse Ox monitoring Intermittent   O2 Sat comments 95% on RA    Is the patient/caregiver able to teach back steps to recovery at home? Rest and rebuild strength, gradually increase activity, Eat a well-balance diet   Patient reports what zone on this call? Green Zone   Green Zone Reports doing well, Sleeping well, Appetite is good, Breathing without shortness of breath   Green Zone interventions: Take daily medications   COVID-19 call completed? Yes   Revoked No further contact(revokes)-requires comment   Is the patient interested in additional calls from an ambulatory ?  NOTE:  applies to high risk patients requiring additional follow-up. No   Graduated/Revoked comments Pt reports he's continuing to improve. He does not have COVID symptoms.           AMALIA DOBBINS - Registered Nurse

## 2022-04-18 ENCOUNTER — OFFICE VISIT (OUTPATIENT)
Dept: FAMILY MEDICINE CLINIC | Age: 57
End: 2022-04-18

## 2022-04-18 VITALS
OXYGEN SATURATION: 93 % | HEART RATE: 68 BPM | DIASTOLIC BLOOD PRESSURE: 60 MMHG | HEIGHT: 69 IN | SYSTOLIC BLOOD PRESSURE: 158 MMHG | BODY MASS INDEX: 39.95 KG/M2 | TEMPERATURE: 97.9 F

## 2022-04-18 DIAGNOSIS — K21.9 GASTROESOPHAGEAL REFLUX DISEASE WITHOUT ESOPHAGITIS: ICD-10-CM

## 2022-04-18 DIAGNOSIS — N18.32 STAGE 3B CHRONIC KIDNEY DISEASE: ICD-10-CM

## 2022-04-18 DIAGNOSIS — E55.9 VITAMIN D DEFICIENCY: ICD-10-CM

## 2022-04-18 DIAGNOSIS — I10 ESSENTIAL HYPERTENSION: ICD-10-CM

## 2022-04-18 DIAGNOSIS — I48.0 PAROXYSMAL ATRIAL FIBRILLATION: ICD-10-CM

## 2022-04-18 DIAGNOSIS — I10 ESSENTIAL (PRIMARY) HYPERTENSION: ICD-10-CM

## 2022-04-18 DIAGNOSIS — J44.1 CHRONIC OBSTRUCTIVE PULMONARY DISEASE WITH ACUTE EXACERBATION: Primary | ICD-10-CM

## 2022-04-18 DIAGNOSIS — I21.3 ST ELEVATION MYOCARDIAL INFARCTION (STEMI), UNSPECIFIED ARTERY: ICD-10-CM

## 2022-04-18 DIAGNOSIS — E11.65 TYPE 2 DIABETES MELLITUS WITH HYPERGLYCEMIA, WITH LONG-TERM CURRENT USE OF INSULIN: ICD-10-CM

## 2022-04-18 DIAGNOSIS — Z22.322 MRSA NASAL COLONIZATION: ICD-10-CM

## 2022-04-18 DIAGNOSIS — I50.32 CHRONIC DIASTOLIC (CONGESTIVE) HEART FAILURE: ICD-10-CM

## 2022-04-18 DIAGNOSIS — Z79.4 TYPE 2 DIABETES MELLITUS WITH HYPERGLYCEMIA, WITH LONG-TERM CURRENT USE OF INSULIN: ICD-10-CM

## 2022-04-18 PROCEDURE — 1111F DSCHRG MED/CURRENT MED MERGE: CPT | Performed by: FAMILY MEDICINE

## 2022-04-18 PROCEDURE — 99214 OFFICE O/P EST MOD 30 MIN: CPT | Performed by: FAMILY MEDICINE

## 2022-04-18 RX ORDER — FAMOTIDINE 40 MG/1
40 TABLET, FILM COATED ORAL DAILY
Qty: 90 TABLET | Refills: 1 | Status: SHIPPED | OUTPATIENT
Start: 2022-04-18 | End: 2022-11-28 | Stop reason: SDUPTHER

## 2022-04-18 RX ORDER — HYDRALAZINE HYDROCHLORIDE 25 MG/1
50 TABLET, FILM COATED ORAL 3 TIMES DAILY
Qty: 90 TABLET | Refills: 2 | Status: SHIPPED | OUTPATIENT
Start: 2022-04-18 | End: 2022-05-10

## 2022-04-18 RX ORDER — CHOLECALCIFEROL (VITAMIN D3) 125 MCG
1 CAPSULE ORAL DAILY
Qty: 90 TABLET | Refills: 1 | Status: SHIPPED | OUTPATIENT
Start: 2022-04-18 | End: 2022-10-07

## 2022-04-18 NOTE — PROGRESS NOTES
Preston Wallis presents to [facility].    Chief Complaint:  F/U RESPIRATORY FAILURE    Subjective       History of Present Illness:  HPI    Preston Wallis is a 56 y.o. male chronic obstructive pulmonary disease, sleep apnea, noncompliance with CPAP/inhalers, chronic kidney disease stage III, type 2 diabetes with complications who I sent over the ER after he presented to meet with shortness of breath.    Hypoxia and wheezing.  CT of the chest notable for changes of bronchiectasis with surrounding consolidation and pulmonary edema.  Infectious work-up obtained and he ended up being COVID-positive.  Had a history of previous MRSA and Pseudomonas pneumonia.  Started on IV broad-spectrum antibiotics, tobramycin nebulizers, steroids, diuretics.    COVID was treated with remdesivir and completed 5 days.  Liver enzymes remained stable and creatinine remained near baseline.   When he stabilized he was discharged home in stable condition on doxycycline he was also started on prednisone with a rapid taper per pulmonology recommendations. WBC on admit 16, d/c was 13.  COVID was positive for covid19, MRSA in nose, Blood cx negative              CT  FINDINGS:            The visualized soft tissue structures at the base of the neck including the thyroid appear within normal limits.  There is no lower cervical or axillary adenopathy.  There is a left-sided chest port with tip terminating in the SVC.  The heart is enlarged.  There is no pericardial effusion.  The aorta is normal in caliber without evidence of aneurysm formation.  The main pulmonary artery appears normal in caliber.  There are partially calcified AP window lymph nodes likely related to chronic granulomatous disease.  There is an enlarged right paratracheal lymph node measuring up to 1.4 cm in short axis, increased from the prior examination.  This may be reactive to the underlying pulmonary parenchymal disease.  There is abnormal  central bronchiectasis involving the bilateral upper and bilateral lower lobes.  There are small bilateral pleural effusions which appear new from the prior examination.  There is smooth interlobular septal thickening likely related to interstitial edema pattern.  There are areas of consolidation surrounding the bronchiectasis suspicious for superimposed pneumonia.  The esophagus is normal in course and caliber.  Visualized portions of the upper abdomen demonstrate postcholecystectomy changes.  There is paravertebral bridging ossification compatible with diffuse idiopathic skeletal hyperostosis.  There is an old left posterior 8th rib fracture.     1. Cardiomegaly with small bilateral pleural effusions, smooth interlobular septal thickening and ground-glass opacities likely representing pulmonary edema pattern. 2. Central bronchiectasis involving both lungs with surrounding consolidation likely representing superimposed infection. 3. Enlarged right paratracheal lymph node, likely reactive to underlying pulmonary disease.         CXR:   Bilateral airspace disease is improved in comparison to 4/5/2022.         Follow-Ups     1 Follow up with [facility] (Cardiac Rehabilitation)  2 Follow up with Kimmy Riley MD (Family Medicine); 1 week  3 Follow up with Severiano Carolina MD (Pulmonary Disease)  4 Follow up with Kimmy Riley MD (Family Medicine)    Medication List at Discharge       Arformoterol Tartrate 15 mcg Nebulization 2 Times Daily - RT  Budesonide 0.5 mg Nebulization 2 Times Daily - RT  Doxycycline Hyclate 100 mg Oral 2 Times Daily  predniSONE 20 MG Take 2 tablets by mouth Daily for 2 days, THEN 1.5 tablets Daily for 2 days, THEN 1 tablet Daily for 2 days, THEN 0.5 tablets Daily for 2 days.  Acetaminophen 1,000 mg Oral Every 8 Hours PRN  Albuterol Sulfate 108 (90 Base) MCG/ACT 2 puffs Inhalation 4 Times Daily PRN  Apixaban 5 MG take 2 tablets BY MOUTH  EVERY DAY   Patient taking differently:  Take 5 mg by mouth 2 (Two) Times a Day.  Aspirin 81 mg Oral Daily  Atorvastatin Calcium 20 mg Oral Nightly  Blood Glucose Monitoring Suppl w/Device 1 kit Does not apply Take As Directed, Dx e11.9  Calcium Citrate 950 (200 Ca) MG TAKE 1 TABLET BY MOUTH EVERY DAY  Cholecalciferol 50 MCG (2000 UT) Take 1 tablet BY MOUTH EVERY MORNING FOR vitamin deficiency   Patient taking differently:  Take 1 tablet by mouth Daily.  Dapagliflozin Propanediol 5 mg Oral Daily  Dextromethorphan-guaiFENesin  MG 1 tablet Oral 2 Times Daily PRN  dilTIAZem HCl Coated Beads 120 mg Oral Daily  Docusate Sodium 100 MG TAKE 1 CAPSULE BY MOUTH TWICE DAILY  DULoxetine HCl 60 mg Oral 2 Times Daily  Exenatide 2 mg Subcutaneous Weekly  Famotidine 40 mg Oral 2 Times Daily  Ferrous Gluconate 324 mg Oral Daily With Breakfast  Furosemide 40 mg Oral Daily ONLY 3 x PER WEEK  Gabapentin 300 mg Oral Every Night at Bedtime  Protocol Details  Glucose Blood 1 each Other 4 Times Daily With Meals & Nightly, Use as instructed dx e 11.9  hydrALAZINE HCl 25 mg Oral 2 Times Daily  Insulin Glargine 40 Units Subcutaneous Daily  Insulin Lispro Subcutaneous, Per SSI, if BS <150 = 0 units, 151-180= 1 unit, 181-210= 2units, 211-240= 3units, 241-270= 4units, 271-300= 5units, 301-330=6 untis, 331-390= 8units, <390 give 9units  Insulin Pen Needle 31G X 5 MM USE AS DIRECTED WITH INSULIN  Ipratropium-Albuterol 0.5-2.5 mg/3 ml 3 mL Nebulization Every 4 Hours PRN  Lancets USE AS DIRECTED  Magnesium Oxide 400 mg Oral 2 Times Daily  Metoprolol Tartrate 100 mg Oral 2 Times Daily  O2 2 Liter O2 - CONTINUOUS (route: Oxygen)  Potassium Chloride 10 MEQ Take 1 po 3 days a week with lasix.   Patient taking differently:  Take 10 mEq by mouth 3 (Three) Times a Week. Take 1 po 3 days a week with lasix.  Tamsulosin HCl 0.4 mg Oral Every Evening  traZODone HCl 100 mg Oral Nightly    Review of Systems:  Review of Systems   Constitutional: Negative  for chills, fatigue and fever.   HENT: Negative for congestion, ear discharge and sore throat.    Respiratory: Positive for cough, shortness of breath and wheezing.    Cardiovascular: Negative for chest pain, palpitations and leg swelling.   Gastrointestinal: Negative for abdominal pain, constipation, diarrhea, nausea, vomiting and GERD.   Genitourinary: Negative for flank pain.   Neurological: Negative for dizziness and headache.   Psychiatric/Behavioral: Negative for depressed mood.        Objective   Medical History:  Past Medical History:   • Age-related cognitive decline   • Allergic contact dermatitis   • Allergies   • Anemia   • Bronchiectasis with acute lower respiratory infection ([facility])   • Chest pain   • Chronic bronchitis ([facility])   • Chronic diastolic (congestive) heart failure ([facility])   • Chronic kidney disease   • Chronic respiratory failure with hypoxia ([facility])   • COPD (chronic obstructive pulmonary disease) ([facility])   • COPD with acute exacerbation ([facility])   • Cough   • Dependence on supplemental oxygen   • Eczema   • Erectile dysfunction    due to organic reasons   • Essential (primary) hypertension   • Fracture    closed fracture of other tarsal and metatarsal bones   • GERD without esophagitis   • High risk medication use   • Hypercholesteremia   • Hypomagnesemia   • Insomnia   • Low back pain   • Major depressive disorder   • Major depressive disorder   • Morbid (severe) obesity due to excess calories ([facility])   • MRSA pneumonia ([facility])   • Muscle weakness   • 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 without evidence of necrosis ([facility])   • Obstructive sleep apnea (adult) (pediatric)   • Other forms of dyspnea   • Other long term (current) drug therapy   • Other pulmonary embolism without acute cor pulmonale ([facility])   • Other specified noninfective gastroenteritis and colitis   • Other spondylosis, lumbar region   • Pain in both knees   • Paroxysmal atrial fibrillation ([facility])    • Peripheral neuropathy    attributed to type 2 diabetes   • Pneumonia, unspecified organism   • Polyneuropathy   • Rash and other nonspecific skin eruption   • Syncope and collapse   • Tachycardia   • Type 1 diabetes mellitus with diabetic chronic kidney disease (HCC)   • Type 2 diabetes mellitus (HCC)   • Unspecified fall, initial encounter     Past Surgical History:   • CHOLECYSTECTOMY   • KNEE SURGERY   • OTHER SURGICAL HISTORY    venous port   • TONSILLECTOMY AND ADENOIDECTOMY      Family History   Problem Relation Age of Onset   • Coronary artery disease Mother    • Hypertension Mother    • Diabetes type II Mother    • Asthma Father    • Cancer Sister      Social History     Tobacco Use   • Smoking status: Former Smoker     Packs/day: 1.00     Years: 6.00     Pack years: 6.00     Types: Cigarettes     Quit date:      Years since quittin.3   • Smokeless tobacco: Never Used   Substance Use Topics   • Alcohol use: Not Currently       Health Maintenance Due   Topic Date Due   • COLORECTAL CANCER SCREENING  Never done   • ANNUAL PHYSICAL  Never done   • COVID-19 Vaccine (1) Never done   • Hepatitis B (1 of 3 - Risk 3-dose series) Never done   • Pneumococcal Vaccine 0-64 (2 - PCV) 1998   • ZOSTER VACCINE (1 of 2) Never done   • DIABETIC FOOT EXAM  Never done   • DIABETIC EYE EXAM  2021        Immunization History   Administered Date(s) Administered   • Flu Vaccine Quad PF >36MO 10/18/2016, 10/16/2017, 2019   • Flu Vaccine Split Quad 2019   • Influenza Quad Vaccine (Inpatient) 2013   • Influenza, Unspecified 2020   • Pneumococcal Polysaccharide (PPSV23) 1997   • Tdap 2018       Allergies   Allergen Reactions   • Benadryl [Diphenhydramine] Itching   • Proventil [Albuterol] Other (See Comments)     Mouth sores          Medications:  Current Outpatient Medications on File Prior to Visit   Medication Sig   • acetaminophen (TYLENOL) 500 MG tablet Take 1,000 mg by  mouth Every 8 (Eight) Hours As Needed.   • albuterol sulfate  (90 Base) MCG/ACT inhaler Inhale 2 puffs 4 (Four) Times a Day As Needed.   • arformoterol (BROVANA) 15 MCG/2ML nebulizer solution Take 2 mL by nebulization 2 (Two) Times a Day.   • aspirin 81 MG EC tablet Take 81 mg by mouth Daily.   • atorvastatin (LIPITOR) 20 MG tablet Take 1 tablet by mouth Every Night.   • B-D UF III MINI PEN NEEDLES 31G X 5 MM misc USE AS DIRECTED WITH INSULIN   • Blood Glucose Monitoring Suppl w/Device kit 1 kit Take As Directed. Dx e11.9   • budesonide (PULMICORT) 0.5 MG/2ML nebulizer solution Take 2 mL by nebulization 2 (Two) Times a Day.   • calcium citrate (CALCITRATE) 950 (200 Ca) MG tablet TAKE 1 TABLET BY MOUTH EVERY DAY   • dapagliflozin (Farxiga) 5 MG tablet tablet Take 1 tablet by mouth Daily.   • dilTIAZem CD (CARDIZEM CD) 120 MG 24 hr capsule Take 1 capsule by mouth Daily.   • docusate sodium (COLACE) 100 MG capsule TAKE 1 CAPSULE BY MOUTH TWICE DAILY   • doxycycline (VIBRAMYCIN) 100 MG capsule Take 1 capsule by mouth 2 (Two) Times a Day.   • DULoxetine (CYMBALTA) 60 MG capsule Take 1 capsule by mouth 2 (Two) Times a Day.   • exenatide er (Bydureon BCise) 2 MG/0.85ML auto-injector injection Inject 0.85 mL under the skin into the appropriate area as directed 1 (One) Time Per Week.   • ferrous gluconate (FERGON) 324 MG tablet Take 1 tablet by mouth Daily With Breakfast.   • furosemide (LASIX) 40 MG tablet Take 1 tablet by mouth Daily.   • gabapentin (NEURONTIN) 300 MG capsule Take 1 capsule by mouth every night at bedtime.   • glucose blood test strip 1 each by Other route 4 (Four) Times a Day With Meals & at Bedtime. Use as instructed dx e 11.9   • guaifenesin-dextromethorphan (MUCINEX DM)  MG tablet sustained-release 12 hour tablet Take 1 tablet by mouth 2 (Two) Times a Day As Needed (congestion).   • Insulin Glargine (LANTUS SOLOSTAR) 100 UNIT/ML injection pen Inject 40 Units under the skin into the  appropriate area as directed Daily.   • Insulin Lispro (ADMELOG SOLOSTAR SC) Inject  under the skin into the appropriate area as directed. Per SSI, if BS <150 = 0 units, 151-180= 1 unit, 181-210= 2units, 211-240= 3units, 241-270= 4units, 271-300= 5units, 301-330=6 untis, 331-390= 8units, <390 give 9units   • ipratropium-albuterol (DUO-NEB) 0.5-2.5 mg/3 ml nebulizer Take 3 mL by nebulization Every 4 (Four) Hours As Needed.   • magnesium oxide (MAGOX) 400 (241.3 Mg) MG tablet tablet Take 400 mg by mouth 2 (Two) Times a Day.   • metoprolol tartrate (LOPRESSOR) 100 MG tablet Take 1 tablet by mouth 2 (Two) Times a Day.   • O2 (OXYGEN) 2 Liter O2 - CONTINUOUS (route: Oxygen)   • potassium chloride 10 MEQ CR tablet Take 1 po 3 days a week with lasix. (Patient taking differently: Take 10 mEq by mouth 3 (Three) Times a Week. Take 1 po 3 days a week with lasix.)   • predniSONE (DELTASONE) 20 MG tablet Take 2 tablets by mouth Daily for 2 days, THEN 1.5 tablets Daily for 2 days, THEN 1 tablet Daily for 2 days, THEN 0.5 tablets Daily for 2 days.   • tamsulosin (FLOMAX) 0.4 MG capsule 24 hr capsule Take 1 capsule by mouth Every Evening.   • traZODone (DESYREL) 100 MG tablet Take 1 tablet by mouth Every Night.   • TRUEplus Lancets 28G misc USE AS DIRECTED   • [DISCONTINUED] apixaban (Eliquis) 5 MG tablet tablet take 2 tablets BY MOUTH EVERY DAY (Patient taking differently: Take 5 mg by mouth 2 (Two) Times a Day.)   • [DISCONTINUED] Cholecalciferol (Vitamin D3) 50 MCG (2000 UT) tablet Take 1 tablet BY MOUTH EVERY MORNING FOR vitamin deficiency (Patient taking differently: Take 1 tablet by mouth Daily.)   • [DISCONTINUED] famotidine (PEPCID) 40 MG tablet Take 40 mg by mouth 2 (Two) Times a Day.   • [DISCONTINUED] hydrALAZINE (APRESOLINE) 25 MG tablet Take 25 mg by mouth 2 (Two) Times a Day.     No current facility-administered medications on file prior to visit.       Vital Signs:   /60 (BP Location: Right arm, Patient  "Position: Sitting, Cuff Size: Adult)   Pulse 68   Temp 97.9 °F (36.6 °C) (Oral)   Ht 175.3 cm (69\")   SpO2 93% Comment: On 2L on O2  BMI 39.95 kg/m²       Physical Exam:  Physical Exam  Vitals and nursing note reviewed.   Constitutional:       General: He is not in acute distress.     Appearance: Normal appearance. He is not ill-appearing, toxic-appearing or diaphoretic.   HENT:      Head: Normocephalic and atraumatic.      Right Ear: Tympanic membrane, ear canal and external ear normal.      Left Ear: Tympanic membrane, ear canal and external ear normal.      Nose: No congestion or rhinorrhea.      Mouth/Throat:      Mouth: Mucous membranes are moist.      Pharynx: Oropharynx is clear. No oropharyngeal exudate or posterior oropharyngeal erythema.   Eyes:      Extraocular Movements: Extraocular movements intact.      Conjunctiva/sclera: Conjunctivae normal.      Pupils: Pupils are equal, round, and reactive to light.   Cardiovascular:      Rate and Rhythm: Normal rate and regular rhythm.      Heart sounds: Normal heart sounds.   Pulmonary:      Effort: Pulmonary effort is normal.      Breath sounds: Normal breath sounds. No wheezing, rhonchi or rales.   Abdominal:      General: Abdomen is flat.      Palpations: Abdomen is soft.   Musculoskeletal:      Cervical back: Neck supple. No rigidity.   Lymphadenopathy:      Cervical: No cervical adenopathy.   Skin:     General: Skin is warm and dry.   Neurological:      Mental Status: He is alert and oriented to person, place, and time.   Psychiatric:         Mood and Affect: Mood normal.         Behavior: Behavior normal.         Result Review      The following data was reviewed by Kimmy Riley MD on 04/18/2022.  Lab Results   Component Value Date    WBC 13.86 (H) 04/11/2022    HGB 11.2 (L) 04/11/2022    HCT 35.0 (L) 04/11/2022    MCV 86.2 04/11/2022     04/11/2022     Lab Results   Component Value Date    GLUCOSE 275 (H) 04/11/2022    BUN 62 (H) " 04/11/2022    CREATININE 1.89 (H) 04/11/2022    EGFRIFNONA 46 (L) 07/27/2021    BCR 32.8 (H) 04/11/2022    K 4.1 04/11/2022    CO2 28.4 04/11/2022    CALCIUM 9.1 04/11/2022    ALBUMIN 3.50 04/11/2022    LABIL2 0.9 (L) 09/30/2020    AST 18 04/11/2022    ALT 20 04/11/2022     Lab Results   Component Value Date    CHOL 131 03/17/2022    CHLPL 165 08/26/2020    TRIG 69 03/17/2022    HDL 65 (H) 03/17/2022    LDL 52 03/17/2022     Lab Results   Component Value Date    TSH 0.580 03/17/2020     Lab Results   Component Value Date    HGBA1C 8.30 (H) 03/17/2022     Lab Results   Component Value Date    PSA 0.725 03/17/2022                       Assessment and Plan:          Diagnoses and all orders for this visit:    1. Chronic obstructive pulmonary disease with acute exacerbation (HCC) (Primary)  Comments:  hospitalized for respiratory failure, he is IMPROVED ON BIPAP, moving air well, cont pulm toilet and f/u with Dr Morejon.     2. Chronic diastolic (congestive) heart failure (HCC)    3. Essential (primary) hypertension    4. ST elevation myocardial infarction (STEMI), unspecified artery (HCC)    5. Type 2 diabetes mellitus with hyperglycemia, with long-term current use of insulin (HCC)    6. Stage 3b chronic kidney disease (HCC)    7. Vitamin D deficiency  -     Cholecalciferol (Vitamin D3) 50 MCG (2000 UT) tablet; Take 1 tablet by mouth Daily.  Dispense: 90 tablet; Refill: 1    8. Essential hypertension  -     hydrALAZINE (APRESOLINE) 25 MG tablet; Take 2 tablets by mouth 3 (Three) Times a Day.  Dispense: 90 tablet; Refill: 2    9. Paroxysmal atrial fibrillation (HCC)  -     apixaban (Eliquis) 5 MG tablet tablet; Take 1 tablet by mouth Every 12 (Twelve) Hours.  Dispense: 60 tablet; Refill: 5    10. Gastroesophageal reflux disease without esophagitis  -     famotidine (PEPCID) 40 MG tablet; Take 1 tablet by mouth Daily.  Dispense: 90 tablet; Refill: 1    11. MRSA nasal colonization  -     mupirocin (Bactroban Nasal) 2 %  nasal ointment; into the nostril(s) as directed by provider 2 (Two) Times a Day.  Dispense: 10 g; Refill: 0      He is overall doing so much better.  He is to complete his antibiotics and tapering steroid and continue the oxygen.  I have advised that he start getting up more and doing things for himself around the house.  He needs to start preparing his own meals and getting his own drinks.  Time he has not been very active within the house and typically lays around and sleeps all day or gets in his recliner and watches TV all day.  He is really doing himself a disservice by not being active to help with that lung function improve.  He has a follow-up with pulmonology tomorrow.  Recommend he continue all of his current nebulizers and inhalers at this time.    Follow Up   Return in about 1 month (around 5/18/2022), or if symptoms worsen or fail to improve, for Recheck.

## 2022-04-25 ENCOUNTER — READMISSION MANAGEMENT (OUTPATIENT)
Dept: CALL CENTER | Facility: HOSPITAL | Age: 57
End: 2022-04-25

## 2022-04-25 NOTE — OUTREACH NOTE
Prep Survey    Flowsheet Row Responses   [facility] facility patient discharged from? Non-[facility]   Is LACE score < 7 ? Non-BH Discharge   Emergency Room discharge w/ pulse ox? No   Eligibility TCM   [facility]   Date of Admission 04/21/22   Date of Discharge 04/25/22   Discharge diagnosis Pneumonia    Does the patient have one of the following disease processes/diagnoses(primary or secondary)? Other   Prep survey completed? Yes          AMALIA DOBBINS - Registered Nurse

## 2022-04-26 ENCOUNTER — TRANSITIONAL CARE MANAGEMENT TELEPHONE ENCOUNTER (OUTPATIENT)
Dept: CALL CENTER | Facility: HOSPITAL | Age: 57
End: 2022-04-26

## 2022-04-26 NOTE — OUTREACH NOTE
Call Center TCM Note    Flowsheet Row Responses   [facility] patient discharged from? Non-BH  [[facility]]   Does the patient have one of the following disease processes/diagnoses(primary or secondary)? Other   TCM attempt successful? Yes  [verbal release for wife]   Call start time 0927   Call end time 0940   Discharge diagnosis Pneumonia    Person spoke with today (if not patient) and relationship Cristiana Mcclure reviewed with patient/caregiver? Yes   Is the patient having any side effects they believe may be caused by any medication additions or changes? No   Does the patient have all medications ordered at discharge? Yes   Is the patient taking all medications as directed (includes completed medication regime)? Yes   Medication comments Wife reports steroid and another medication added but uncertain of name at time of call   Comments regarding appointments Pulmonary 5/3/22   Does the patient have a primary care provider?  Yes   Does the patient have an appointment with their PCP within 7 days of discharge? Yes   Comments regarding PCP Hospital PCP FOLLOW UP APPOINTMENT IS 5/2/22@915am   Has the patient kept scheduled appointments due by today? N/A   What is the Home health agency?  [facility]   Has home health visited the patient within 72 hours of discharge? Unsure   DME comments Patient has O2, Bipap at home--compliant with use.  Family to purchase a pulse ox and is still awaiting approval for chest vest (has been sent to another company for possible coverage)   Psychosocial issues? No   Did the patient receive a copy of their discharge instructions? Yes   Nursing interventions Reviewed instructions with patient   What is the patient's perception of their health status since discharge? Improving  [Wife reports patient treated for pneumonia--encouraged use of nebs, oxygen devices as well as I.S. and flutter valves.  Reviewed return s/s and need to seek care.  ]   Is the patient/caregiver able to teach back signs  and symptoms related to disease process for when to call PCP? Yes   Is the patient/caregiver able to teach back signs and symptoms related to disease process for when to call 911? Yes   Is the patient/caregiver able to teach back the hierarchy of who to call/visit for symptoms/problems? PCP, Specialist, Home health nurse, Urgent Care, ED, 911 Yes   If the patient is a current smoker, are they able to teach back resources for cessation? Not a smoker  [Not smoking in over 20 years]   TCM call completed? Yes          Carmen Zurita RN    4/26/2022, 09:44 EDT

## 2022-05-02 ENCOUNTER — OFFICE VISIT (OUTPATIENT)
Dept: FAMILY MEDICINE CLINIC | Age: 57
End: 2022-05-02

## 2022-05-02 VITALS
HEART RATE: 65 BPM | DIASTOLIC BLOOD PRESSURE: 41 MMHG | BODY MASS INDEX: 39.95 KG/M2 | HEIGHT: 69 IN | OXYGEN SATURATION: 95 % | SYSTOLIC BLOOD PRESSURE: 103 MMHG

## 2022-05-02 DIAGNOSIS — G62.9 POLYNEUROPATHY: ICD-10-CM

## 2022-05-02 DIAGNOSIS — G47.33 OBSTRUCTIVE SLEEP APNEA (ADULT) (PEDIATRIC): ICD-10-CM

## 2022-05-02 DIAGNOSIS — I50.33 ACUTE ON CHRONIC DIASTOLIC (CONGESTIVE) HEART FAILURE: ICD-10-CM

## 2022-05-02 DIAGNOSIS — E10.22 TYPE 1 DIABETES MELLITUS WITH STAGE 3A CHRONIC KIDNEY DISEASE: ICD-10-CM

## 2022-05-02 DIAGNOSIS — I10 ESSENTIAL (PRIMARY) HYPERTENSION: ICD-10-CM

## 2022-05-02 DIAGNOSIS — N18.31 TYPE 1 DIABETES MELLITUS WITH STAGE 3A CHRONIC KIDNEY DISEASE: ICD-10-CM

## 2022-05-02 DIAGNOSIS — J44.1 COPD WITH EXACERBATION: Primary | ICD-10-CM

## 2022-05-02 DIAGNOSIS — I48.0 PAROXYSMAL ATRIAL FIBRILLATION: ICD-10-CM

## 2022-05-02 DIAGNOSIS — N18.31 STAGE 3A CHRONIC KIDNEY DISEASE: ICD-10-CM

## 2022-05-02 PROCEDURE — 99214 OFFICE O/P EST MOD 30 MIN: CPT | Performed by: FAMILY MEDICINE

## 2022-05-02 RX ORDER — PREDNISONE 20 MG/1
TABLET ORAL
COMMUNITY
Start: 2022-04-25 | End: 2022-06-02

## 2022-05-02 RX ORDER — AZITHROMYCIN 250 MG/1
TABLET, FILM COATED ORAL
COMMUNITY
Start: 2022-04-25 | End: 2022-06-02

## 2022-05-02 RX ORDER — LOSARTAN POTASSIUM 50 MG/1
TABLET ORAL
COMMUNITY
Start: 2022-04-25 | End: 2022-06-02

## 2022-05-02 NOTE — PROGRESS NOTES
Preston Wallis presents to [facility].    Chief Complaint: PRATIBHA for CO2 narcosis, copd exacerbation    Subjective       History of Present Illness:  HPI     I am seeing Mr. Wallis today for follow-up from recent hospitalization.  He was admitted to [facility] on 4/21/2022 and discharged on 4/25/2022.  He was admitted for chronic obstructive pulmonary disease exacerbation with acute on chronic hypoxic and hypercapnic respiratory failure as well as acute on chronic heart failure with diastolic dysfunction.  He also has underlying hypertension, diabetes, GERD, hypercholesterolemia, sleep apnea, stage 3 renal failure as well as recurrent pneumonia and h/o MRSA and pseudomonas pneumonia.  This is his second hospitalization in the last couple weeks.  For the exact same condition.  Mr. Wallis presented to the ER with acute onset of worsening shortness of air, fatigue and decreased activity tolerance.  His PCO2 was up in the 80s with adequate oxygen saturations on 4 L nasal cannula but because he had CO2 narcosis he was placed on BiPAP.  His CO2 decreased down to the 50 range with overnight BiPAP and with this his mental status improved and he was started on Solu-Medrol IV treatment and IV antibiotic biotics for suspected pneumonia Mr. Wallis had a slow recovery during his hospital course primarily with a significant decrease in his activity tolerance because of his recent recurrent exacerbation Mr. Wallis was sent home on a longer tapering dose of steroids as well as azithromycin 3 times a week as a prophylactic antibiotic in addition Mr. Wallis was volume overloaded and needed IV diuresis and upon discharge had an increase in his oral Lasix dose discharge medication includes azithromycin 250 mg Monday Wednesday Friday, Eliquis 5 mg twice daily, Lasix 40 mg twice daily, Cozaar 50 mg daily, prednisone 60 mg tapering over the next 8 days, albuterol MDI,exanatide 2 mg subcu as directed Lipitor  20 mg diltiazem 120 mg XR aspirin 81 Symbicort 80/4.5, insulin lispro, calcium citrate, ferrous sulfate, Pepcid, Lopressor 500 twice daily, hydralazine 50 mg twice daily, trazodone 100 mg nightly, Basaglar 50 units subcu daily, Neurontin 300 mg nightly, Cymbalta 60 mg twice daily, Brovana 15 mcg twice daily, Pulmicort 0.5 mg twice daily, Roseann Sean flows in 5 mg daily, guaifenesin 60 mg twice daily, DuoNebs every 4 hours as needed, magnesium oxide 400 mg twice daily, Klor-Con 10 mEq daily, Flomax 0.5 mg at bedtime, vitamin D 50 mcg daily.  Upon discharge he was started on a low-sodium and diabetic diet and activity as tolerated.  He has an appt with Scott tomorrow.   His BS are running in the 400s due to prednisone.  Insulin dose is now 60 basaglar qhs and SSI .  He is testing his BS 4 X a day.  He has home health, getting PT and nursing, he is unable to stand on his own due to LE weakness and edema.  I recommend NH place and he refuses.  I explained to him that he is a high fall risk in his current state and I do not feel like he is safe at home.        CT of the chest a couple weeks ago was notable for changes of bronchiectasis with surrounding consolidation and pulmonary edema.  Infectious work-up obtained and he ended up being COVID-positive.        Review of Systems:  Review of Systems   Constitutional: Negative for chills, fatigue and fever.   HENT: Negative for congestion, ear discharge and sore throat.    Respiratory: Negative for shortness of breath.    Cardiovascular: Negative for chest pain.   Gastrointestinal: Negative for abdominal pain, constipation, diarrhea, nausea, vomiting and GERD.   Genitourinary: Negative for flank pain.   Neurological: Negative for dizziness and headache.   Psychiatric/Behavioral: Negative for depressed mood.        Objective   Medical History:  Past Medical History:   • Age-related cognitive decline   • Allergic contact dermatitis   • Allergies   • Anemia   •  Bronchiectasis with acute lower respiratory infection ([facility])   • Chest pain   • Chronic bronchitis ([facility])   • Chronic diastolic (congestive) heart failure ([facility])   • Chronic kidney disease   • Chronic respiratory failure with hypoxia ([facility])   • COPD (chronic obstructive pulmonary disease) ([facility])   • COPD with acute exacerbation ([facility])   • Cough   • Dependence on supplemental oxygen   • Eczema   • Erectile dysfunction    due to organic reasons   • Essential (primary) hypertension   • Fracture    closed fracture of other tarsal and metatarsal bones   • GERD without esophagitis   • High risk medication use   • Hypercholesteremia   • Hypomagnesemia   • Insomnia   • Low back pain   • Major depressive disorder   • Major depressive disorder   • Morbid (severe) obesity due to excess calories ([facility])   • MRSA pneumonia ([facility])   • Muscle weakness   • 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 without evidence of necrosis ([facility])   • Obstructive sleep apnea (adult) (pediatric)   • Other forms of dyspnea   • Other long term (current) drug therapy   • Other pulmonary embolism without acute cor pulmonale ([facility])   • Other specified noninfective gastroenteritis and colitis   • Other spondylosis, lumbar region   • Pain in both knees   • Paroxysmal atrial fibrillation ([facility])   • Peripheral neuropathy    attributed to type 2 diabetes   • Pneumonia, unspecified organism   • Polyneuropathy   • Rash and other nonspecific skin eruption   • Syncope and collapse   • Tachycardia   • Type 1 diabetes mellitus with diabetic chronic kidney disease ([facility])   • Type 2 diabetes mellitus ([facility])   • Unspecified fall, initial encounter     Past Surgical History:   • CHOLECYSTECTOMY   • KNEE SURGERY   • OTHER SURGICAL HISTORY    venous port   • TONSILLECTOMY AND ADENOIDECTOMY      Family History   Problem Relation Age of Onset   • Coronary artery disease Mother    • Hypertension Mother    • Diabetes type II Mother    • Asthma  Father    • Cancer Sister      Social History     Tobacco Use   • Smoking status: Former Smoker     Packs/day: 1.00     Years: 6.00     Pack years: 6.00     Types: Cigarettes     Quit date:      Years since quittin.3   • Smokeless tobacco: Never Used   Substance Use Topics   • Alcohol use: Not Currently       Health Maintenance Due   Topic Date Due   • COLORECTAL CANCER SCREENING  Never done   • ANNUAL PHYSICAL  Never done   • COVID-19 Vaccine (1) Never done   • Hepatitis B (1 of 3 - Risk 3-dose series) Never done   • Pneumococcal Vaccine 0-64 (2 - PCV) 1998   • ZOSTER VACCINE (1 of 2) Never done   • DIABETIC FOOT EXAM  Never done   • DIABETIC EYE EXAM  2021        Immunization History   Administered Date(s) Administered   • Flu Vaccine Quad PF >36MO 10/18/2016, 10/16/2017, 2019   • Flu Vaccine Split Quad 2019   • Influenza Quad Vaccine (Inpatient) 2013   • Influenza, Unspecified 2020   • Pneumococcal Polysaccharide (PPSV23) 1997   • Tdap 2018       Allergies   Allergen Reactions   • Benadryl [Diphenhydramine] Itching   • Proventil [Albuterol] Other (See Comments)     Mouth sores          Medications:  Current Outpatient Medications on File Prior to Visit   Medication Sig   • acetaminophen (TYLENOL) 500 MG tablet Take 1,000 mg by mouth Every 8 (Eight) Hours As Needed.   • albuterol sulfate  (90 Base) MCG/ACT inhaler Inhale 2 puffs 4 (Four) Times a Day As Needed.   • apixaban (Eliquis) 5 MG tablet tablet Take 1 tablet by mouth Every 12 (Twelve) Hours.   • arformoterol (BROVANA) 15 MCG/2ML nebulizer solution Take 2 mL by nebulization 2 (Two) Times a Day.   • aspirin 81 MG EC tablet Take 81 mg by mouth Daily.   • atorvastatin (LIPITOR) 20 MG tablet Take 1 tablet by mouth Every Night.   • azithromycin (ZITHROMAX) 250 MG tablet    • B-D UF III MINI PEN NEEDLES 31G X 5 MM misc USE AS DIRECTED WITH INSULIN   • Blood Glucose Monitoring Suppl w/Device kit 1 kit  Take As Directed. Dx e11.9   • budesonide (PULMICORT) 0.5 MG/2ML nebulizer solution Take 2 mL by nebulization 2 (Two) Times a Day.   • calcium citrate (CALCITRATE) 950 (200 Ca) MG tablet TAKE 1 TABLET BY MOUTH EVERY DAY   • Cholecalciferol (Vitamin D3) 50 MCG (2000 UT) tablet Take 1 tablet by mouth Daily.   • dapagliflozin (Farxiga) 5 MG tablet tablet Take 1 tablet by mouth Daily.   • dilTIAZem CD (CARDIZEM CD) 120 MG 24 hr capsule Take 1 capsule by mouth Daily.   • docusate sodium (COLACE) 100 MG capsule TAKE 1 CAPSULE BY MOUTH TWICE DAILY   • doxycycline (VIBRAMYCIN) 100 MG capsule Take 1 capsule by mouth 2 (Two) Times a Day.   • DULoxetine (CYMBALTA) 60 MG capsule Take 1 capsule by mouth 2 (Two) Times a Day.   • exenatide er (Bydureon BCise) 2 MG/0.85ML auto-injector injection Inject 0.85 mL under the skin into the appropriate area as directed 1 (One) Time Per Week.   • famotidine (PEPCID) 40 MG tablet Take 1 tablet by mouth Daily.   • ferrous gluconate (FERGON) 324 MG tablet Take 1 tablet by mouth Daily With Breakfast.   • furosemide (LASIX) 40 MG tablet Take 1 tablet by mouth Daily. (Patient taking differently: Take 40 mg by mouth 2 (Two) Times a Day.)   • gabapentin (NEURONTIN) 300 MG capsule Take 1 capsule by mouth every night at bedtime.   • glucose blood test strip 1 each by Other route 4 (Four) Times a Day With Meals & at Bedtime. Use as instructed dx e 11.9   • guaifenesin-dextromethorphan (MUCINEX DM)  MG tablet sustained-release 12 hour tablet Take 1 tablet by mouth 2 (Two) Times a Day As Needed (congestion).   • hydrALAZINE (APRESOLINE) 25 MG tablet Take 2 tablets by mouth 3 (Three) Times a Day.   • Insulin Glargine (LANTUS SOLOSTAR) 100 UNIT/ML injection pen Inject 40 Units under the skin into the appropriate area as directed Daily.   • Insulin Lispro (ADMELOG SOLOSTAR SC) Inject  under the skin into the appropriate area as directed. Per SSI, if BS <150 = 0 units, 151-180= 1 unit, 181-210=  "2units, 211-240= 3units, 241-270= 4units, 271-300= 5units, 301-330=6 untis, 331-390= 8units, <390 give 9units   • ipratropium-albuterol (DUO-NEB) 0.5-2.5 mg/3 ml nebulizer Take 3 mL by nebulization Every 4 (Four) Hours As Needed.   • losartan (COZAAR) 50 MG tablet    • magnesium oxide (MAGOX) 400 (241.3 Mg) MG tablet tablet Take 400 mg by mouth 2 (Two) Times a Day.   • metoprolol tartrate (LOPRESSOR) 100 MG tablet Take 1 tablet by mouth 2 (Two) Times a Day.   • mupirocin (Bactroban Nasal) 2 % nasal ointment into the nostril(s) as directed by provider 2 (Two) Times a Day.   • O2 (OXYGEN) 2 Liter O2 - CONTINUOUS (route: Oxygen)   • potassium chloride 10 MEQ CR tablet Take 1 po 3 days a week with lasix. (Patient taking differently: Take 10 mEq by mouth 3 (Three) Times a Week. Take 1 po 3 days a week with lasix.)   • predniSONE (DELTASONE) 20 MG tablet    • tamsulosin (FLOMAX) 0.4 MG capsule 24 hr capsule Take 1 capsule by mouth Every Evening.   • traZODone (DESYREL) 100 MG tablet Take 1 tablet by mouth Every Night.   • TRUEplus Lancets 28G misc USE AS DIRECTED     No current facility-administered medications on file prior to visit.       Vital Signs:   /41 (BP Location: Right arm, Patient Position: Sitting, Cuff Size: Large Adult)   Pulse 65   Ht 175.3 cm (69\")   SpO2 95% Comment: Room air  BMI 39.95 kg/m²       Physical Exam:  Physical Exam  Vitals and nursing note reviewed.   Constitutional:       General: He is not in acute distress.     Appearance: He is obese. He is not ill-appearing, toxic-appearing or diaphoretic.   HENT:      Head: Normocephalic and atraumatic.      Nose: No congestion or rhinorrhea.      Mouth/Throat:      Mouth: Mucous membranes are moist.      Pharynx: Oropharynx is clear. No oropharyngeal exudate or posterior oropharyngeal erythema.   Eyes:      Extraocular Movements: Extraocular movements intact.      Conjunctiva/sclera: Conjunctivae normal.      Pupils: Pupils are equal, round, " and reactive to light.   Cardiovascular:      Rate and Rhythm: Normal rate and regular rhythm.      Heart sounds: Normal heart sounds.   Pulmonary:      Effort: Pulmonary effort is normal.      Breath sounds: Wheezing and rhonchi present.   Abdominal:      General: Abdomen is flat.      Palpations: Abdomen is soft.   Musculoskeletal:      Cervical back: Neck supple. No rigidity.      Right lower leg: 3+ Pitting Edema present.      Left lower leg: 3+ Pitting Edema present.   Skin:     General: Skin is warm and dry.   Neurological:      Mental Status: He is alert and oriented to person, place, and time.   Psychiatric:         Mood and Affect: Mood normal.         Behavior: Behavior normal.         Result Review      The following data was reviewed by Kimmy Riley MD on 05/02/2022.  Lab Results   Component Value Date    WBC 13.86 (H) 04/11/2022    HGB 11.2 (L) 04/11/2022    HCT 35.0 (L) 04/11/2022    MCV 86.2 04/11/2022     04/11/2022     Lab Results   Component Value Date    GLUCOSE 275 (H) 04/11/2022    BUN 62 (H) 04/11/2022    CREATININE 1.89 (H) 04/11/2022    EGFRIFNONA 46 (L) 07/27/2021    BCR 32.8 (H) 04/11/2022    K 4.1 04/11/2022    CO2 28.4 04/11/2022    CALCIUM 9.1 04/11/2022    ALBUMIN 3.50 04/11/2022    LABIL2 0.9 (L) 09/30/2020    AST 18 04/11/2022    ALT 20 04/11/2022     Lab Results   Component Value Date    CHOL 131 03/17/2022    CHLPL 165 08/26/2020    TRIG 69 03/17/2022    HDL 65 (H) 03/17/2022    LDL 52 03/17/2022     Lab Results   Component Value Date    TSH 0.580 03/17/2020     Lab Results   Component Value Date    HGBA1C 8.30 (H) 03/17/2022     Lab Results   Component Value Date    PSA 0.725 03/17/2022                       Assessment and Plan:          Diagnoses and all orders for this visit:    1. COPD with exacerbation (HCC) (Primary)    2. Essential (primary) hypertension    3. Stage 3a chronic kidney disease (HCC)    4. Type 1 diabetes mellitus with stage 3a chronic kidney  disease (HCC)    5. Polyneuropathy    6. Paroxysmal atrial fibrillation (HCC)    7. Obstructive sleep apnea (adult) (pediatric)    8. Acute on chronic diastolic (congestive) heart failure (HCC)      Gómez continues to not do very well.  He is unable to ambulate independently and has significant difficulty with transfers due to lower extremity swelling.  He is clearly fluid overloaded despite being increased in his furosemide to 40 mg twice a day.  He is also not moving air well in all 4 lobes of the lung, his O2 sats are good at 95% and he is not in respiratory distress but I am afraid he is at the beginning of another acute exacerbation of his COPD, especially with his underlying acute on chronic congestive heart failure.  I have advised that he go to [facility] ER where his pulmonologist is but his heart doctor is at [facility] and this does look more like congestive heart failure with fluid overload, so I am hoping he can get into the ER and be monitored closely with his low blood pressure and diuresed.  I also advised that we get him into a nursing home environment for rehab and he is adamant that he absolutely will not go back to a nursing home.  Of note he had CO2 narcosis on his recent admit on 4/21 even though his oxygen levels were good.  He may warrant another CO2 blood test.  Finally his diabetes is out of control with the prednisone his blood sugars have been running in the 4-600s and he needs to get these under control.    Follow Up   Return if symptoms worsen or fail to improve.

## 2022-05-05 ENCOUNTER — TELEPHONE (OUTPATIENT)
Dept: FAMILY MEDICINE CLINIC | Age: 57
End: 2022-05-05

## 2022-05-05 ENCOUNTER — READMISSION MANAGEMENT (OUTPATIENT)
Dept: CALL CENTER | Facility: HOSPITAL | Age: 57
End: 2022-05-05

## 2022-05-05 RX ORDER — DILTIAZEM HYDROCHLORIDE 120 MG/1
CAPSULE, COATED, EXTENDED RELEASE ORAL
Qty: 30 CAPSULE | Refills: 0 | Status: SHIPPED | OUTPATIENT
Start: 2022-05-05 | End: 2022-06-09 | Stop reason: ALTCHOICE

## 2022-05-05 NOTE — TELEPHONE ENCOUNTER
Caller: CRISTELA    Relationship to patient:     Best call back number: [phone]    New or established patient?  [] New  [x] Established    Date of discharge: 5/5/22    Facility discharged from: [facility]    Diagnosis/Symptoms: COPD, COVID    Length of stay (If applicable): 3 DAYS

## 2022-05-05 NOTE — OUTREACH NOTE
Prep Survey    Flowsheet Row Responses   [facility] facility patient discharged from? Non-BH   Is LACE score < 7 ? Non-BH Discharge   Emergency Room discharge w/ pulse ox? No   Eligibility [facility]   Hospital [facility]   Date of Admission 05/02/22   Date of Discharge 05/05/22   Discharge diagnosis COPD, COVID    Does the patient have one of the following disease processes/diagnoses(primary or secondary)? Other   Prep survey completed? Yes          AMALIA DOBBINS - Registered Nurse

## 2022-05-06 ENCOUNTER — TRANSITIONAL CARE MANAGEMENT TELEPHONE ENCOUNTER (OUTPATIENT)
Dept: CALL CENTER | Facility: HOSPITAL | Age: 57
End: 2022-05-06

## 2022-05-06 NOTE — OUTREACH NOTE
Call Center TCM Note    Flowsheet Row Responses   [facility] patient discharged from? Non-BH  [[facility]]   Does the patient have one of the following disease processes/diagnoses(primary or secondary)? Other   TCM attempt successful? Yes  [verbal release for wife]   Call start time 0824   Call end time 0826   Discharge diagnosis COPD, COVID    Person spoke with today (if not patient) and relationship Kami,    Meds reviewed with patient/caregiver? Yes   Is the patient having any side effects they believe may be caused by any medication additions or changes? No   Does the patient have all medications ordered at discharge? N/A   Is the patient taking all medications as directed (includes completed medication regime)? Yes   Medication comments Wife communicates no medication changes at discharge   Does the patient have a primary care provider?  Yes   Does the patient have an appointment with their PCP within 7 days of discharge? Yes   Comments regarding PCP Hospital PCP FOLLOW UP APPOINTMENT IS 5/10/22@0830am   Has the patient kept scheduled appointments due by today? N/A   What is the Home health agency?  [facility]   Has home health visited the patient within 72 hours of discharge? Unsure   DME comments Encouraged use of of O2, Bipap--family has not been able to purchase a pulse ox yet   Psychosocial issues? No   Nursing interventions --  [discharge instructions from [facility]]   What is the patient's perception of their health status since discharge? Improving  [Patient and wife still sleeping when call made so only provided limited information.  Wife reports patient has imrproved-compliant with oxgyen use, no change in medications and remains on steroids. unable to provide BG readings this am. ]   Is the patient/caregiver able to teach back signs and symptoms related to disease process for when to call PCP? Yes   Is the patient/caregiver able to teach back signs and symptoms related to disease process for when to  call 911? Yes   Additional teach back comments Encouraged to monitor respiratory status--seek care, notify MD for changes.    TCM call completed? Yes          Carmen Zurita RN    5/6/2022, 08:32 EDT

## 2022-05-08 DIAGNOSIS — I10 ESSENTIAL HYPERTENSION: ICD-10-CM

## 2022-05-10 ENCOUNTER — OFFICE VISIT (OUTPATIENT)
Dept: FAMILY MEDICINE CLINIC | Age: 57
End: 2022-05-10

## 2022-05-10 VITALS
HEART RATE: 74 BPM | WEIGHT: 272 LBS | DIASTOLIC BLOOD PRESSURE: 55 MMHG | OXYGEN SATURATION: 96 % | TEMPERATURE: 97.7 F | HEIGHT: 69 IN | BODY MASS INDEX: 40.29 KG/M2 | SYSTOLIC BLOOD PRESSURE: 98 MMHG

## 2022-05-10 DIAGNOSIS — N18.31 TYPE 1 DIABETES MELLITUS WITH STAGE 3A CHRONIC KIDNEY DISEASE: ICD-10-CM

## 2022-05-10 DIAGNOSIS — I50.32 CHRONIC DIASTOLIC (CONGESTIVE) HEART FAILURE: ICD-10-CM

## 2022-05-10 DIAGNOSIS — N18.31 STAGE 3A CHRONIC KIDNEY DISEASE: ICD-10-CM

## 2022-05-10 DIAGNOSIS — G47.33 OBSTRUCTIVE SLEEP APNEA (ADULT) (PEDIATRIC): ICD-10-CM

## 2022-05-10 DIAGNOSIS — J44.1 CHRONIC OBSTRUCTIVE PULMONARY DISEASE WITH ACUTE EXACERBATION: Primary | ICD-10-CM

## 2022-05-10 DIAGNOSIS — E10.22 TYPE 1 DIABETES MELLITUS WITH STAGE 3A CHRONIC KIDNEY DISEASE: ICD-10-CM

## 2022-05-10 DIAGNOSIS — I10 ESSENTIAL (PRIMARY) HYPERTENSION: ICD-10-CM

## 2022-05-10 DIAGNOSIS — I48.0 PAROXYSMAL ATRIAL FIBRILLATION: ICD-10-CM

## 2022-05-10 PROBLEM — E11.65 TYPE 2 DIABETES MELLITUS WITH HYPERGLYCEMIA: Status: RESOLVED | Noted: 2022-04-07 | Resolved: 2022-05-10

## 2022-05-10 PROCEDURE — 99214 OFFICE O/P EST MOD 30 MIN: CPT | Performed by: FAMILY MEDICINE

## 2022-05-10 RX ORDER — FINASTERIDE 5 MG/1
5 TABLET, FILM COATED ORAL
COMMUNITY
Start: 2022-05-05 | End: 2022-06-09 | Stop reason: SDUPTHER

## 2022-05-10 RX ORDER — HYDRALAZINE HYDROCHLORIDE 25 MG/1
TABLET, FILM COATED ORAL
Qty: 180 TABLET | Refills: 2 | Status: SHIPPED | OUTPATIENT
Start: 2022-05-10 | End: 2022-06-02 | Stop reason: SDUPTHER

## 2022-05-10 RX ORDER — FOLIC ACID 1 MG/1
1 TABLET ORAL DAILY
COMMUNITY
End: 2022-06-09 | Stop reason: SDUPTHER

## 2022-05-10 RX ORDER — MONTELUKAST SODIUM 10 MG/1
10 TABLET ORAL NIGHTLY
COMMUNITY
End: 2022-06-09 | Stop reason: SDUPTHER

## 2022-05-10 RX ORDER — CEFDINIR 300 MG/1
300 CAPSULE ORAL 2 TIMES DAILY
COMMUNITY
End: 2022-06-02

## 2022-05-10 RX ORDER — METOLAZONE 2.5 MG/1
TABLET ORAL
Qty: 20 TABLET | Refills: 1 | Status: SHIPPED | OUTPATIENT
Start: 2022-05-10 | End: 2022-06-02

## 2022-05-10 NOTE — PROGRESS NOTES
Preston Wallis presents to [facility].    Chief Complaint: Follow-up for transition of care    Subjective   {Problem List  Visit Diagnosis   Encounters  Notes  Medications  Labs  Result Review Imaging  Media :23}     History of Present Illness:  HPI  I saw Gómez on 5/2 for PRATIBHA from hospitalization at [facility] 4/21/2022 and discharged on 4/25/2022.  He was admitted for chronic obstructive pulmonary disease exacerbation with acute on chronic hypoxic and hypercapnic respiratory failure as well as acute on chronic heart failure with diastolic dysfunction.  He also has underlying hypertension, diabetes, GERD, hypercholesterolemia, sleep apnea, stage 3 renal failure as well as recurrent pneumonia and h/o MRSA and pseudomonas pneumonia.  This was his second hospitalization in the last couple weeks.  For the exact same condition.  Mr. Wallis presented to the ER with acute onset of worsening shortness of air, fatigue and decreased activity tolerance.  His PCO2 was up in the 80s with adequate oxygen saturations on 4 L nasal cannula but because he had CO2 narcosis he was placed on BiPAP.  His CO2 decreased down to the 50 range with overnight BiPAP and with this his mental status improved and he was started on Solu-Medrol IV treatment and IV antibiotic biotics for suspected pneumonia Mr. Wallis had a slow recovery during his hospital course primarily with a significant decrease in his activity tolerance because of his recent recurrent exacerbation.  Mr. Wallis was sent home on a longer tapering dose of steroids as well as azithromycin 3 times a week as a prophylactic antibiotic in addition Mr. Wallis was volume overloaded and needed IV diuresis and upon discharge had an increase in his oral Lasix dose discharge medication includes azithromycin 250 mg Monday Wednesday Friday, Eliquis 5 mg twice daily, Lasix 40 mg twice daily, Cozaar 50 mg daily, prednisone 60 mg tapering  Insulin dose is now 60  [facility] and [facility] . CT of the chest a couple weeks ago was notable for changes of bronchiectasis with surrounding consolidation and pulmonary edema.  Infectious work-up obtained and he ended up being COVID-positive.    SO: when I saw Gómez on 5/2 he was not doing well, he was fluid overloaded, low BP, unable to ambulate on his own, and not moving air in his lungs so I sent him back to the ER for CHF exacerbation and concerns with CO2 narcosis and he was readmitted rtom 5/2/22-5/6/22.  Admit diagnosis was heart failure with preserved ejection fraction and COPD exacerbation along with obstructive sleep apnea hypoventilation syndrome.  In the emergency room he was hypoxemic above his baseline and had to have an ABG with elevated CO2 again.  He was again placed on BiPAP and diuresed with Lasix as well as full pulmonary toilet and prednisone in addition he was diagnosed with acute on chronic kidney disease stage III.  Renal ultrasound was performed and within normal limits.  He did have hyperkalemia secondary to acute kidney injury.  In addition his diabetes was uncontrolled and he was treated with insulin.  He also had underlying chronic atrial fibrillation which was under control.  He was discharged home on 5/6/2022 with cefdinir 300 mg twice daily for 5 days folic acid 1 mg daily Proscar 5 mg daily and singular 10 mg daily.  His metoprolol was increased to 100 mg twice daily, duloxetine 30 mg twice daily, Farxiga 5 mg daily, he said 600 mg twice daily, vitamin D 2000 units daily, Lasix 40 mg was increased to 80 mg daily tabs prednisone 40 mg daily x3 days and 20 mg daily x3 days and 10 mg daily x3 days then stop.  BS running < 180 in am and 300s at night    Review of Systems:  Review of Systems   Constitutional: Negative for chills and fever.   HENT: Negative for congestion, ear discharge and sore throat.    Respiratory: Positive for cough and wheezing. Negative for shortness of breath.    Cardiovascular: Positive  for leg swelling. Negative for chest pain and palpitations.   Gastrointestinal: Negative for abdominal pain, constipation, diarrhea, nausea, vomiting and GERD.   Genitourinary: Negative for flank pain.   Neurological: Negative for dizziness and headache.   Psychiatric/Behavioral: Negative for depressed mood.        Objective   Medical History:  Past Medical History:   • Age-related cognitive decline   • Allergic contact dermatitis   • Allergies   • Anemia   • Bronchiectasis with acute lower respiratory infection ([facility])   • Chest pain   • Chronic bronchitis ([facility])   • Chronic diastolic (congestive) heart failure ([facility])   • Chronic kidney disease   • Chronic respiratory failure with hypoxia ([facility])   • COPD (chronic obstructive pulmonary disease) ([facility])   • COPD with acute exacerbation ([facility])   • Cough   • Dependence on supplemental oxygen   • Eczema   • Erectile dysfunction    due to organic reasons   • Essential (primary) hypertension   • Fracture    closed fracture of other tarsal and metatarsal bones   • GERD without esophagitis   • High risk medication use   • Hypercholesteremia   • Hypomagnesemia   • Insomnia   • Low back pain   • Major depressive disorder   • Major depressive disorder   • Morbid (severe) obesity due to excess calories ([facility])   • MRSA pneumonia ([facility])   • Muscle weakness   • 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 without evidence of necrosis ([facility])   • Obstructive sleep apnea (adult) (pediatric)   • Other forms of dyspnea   • Other long term (current) drug therapy   • Other pulmonary embolism without acute cor pulmonale ([facility])   • Other specified noninfective gastroenteritis and colitis   • Other spondylosis, lumbar region   • Pain in both knees   • Paroxysmal atrial fibrillation ([facility])   • Peripheral neuropathy    attributed to type 2 diabetes   • Pneumonia, unspecified organism   • Polyneuropathy   • Rash and other nonspecific skin eruption   • Syncope and  collapse   • Tachycardia   • Type 1 diabetes mellitus with diabetic chronic kidney disease (HCC)   • Type 2 diabetes mellitus (HCC)   • Unspecified fall, initial encounter     Past Surgical History:   • CHOLECYSTECTOMY   • KNEE SURGERY   • OTHER SURGICAL HISTORY    venous port   • TONSILLECTOMY AND ADENOIDECTOMY      Family History   Problem Relation Age of Onset   • Coronary artery disease Mother    • Hypertension Mother    • Diabetes type II Mother    • Asthma Father    • Cancer Sister      Social History     Tobacco Use   • Smoking status: Former Smoker     Packs/day: 1.00     Years: 6.00     Pack years: 6.00     Types: Cigarettes     Quit date:      Years since quittin.3   • Smokeless tobacco: Never Used   Substance Use Topics   • Alcohol use: Not Currently       Health Maintenance Due   Topic Date Due   • COLORECTAL CANCER SCREENING  Never done   • ANNUAL PHYSICAL  Never done   • COVID-19 Vaccine (1) Never done   • Hepatitis B (1 of 3 - Risk 3-dose series) Never done   • Pneumococcal Vaccine 0-64 (2 - PCV) 1998   • ZOSTER VACCINE (1 of 2) Never done   • DIABETIC FOOT EXAM  Never done   • DIABETIC EYE EXAM  2021        Immunization History   Administered Date(s) Administered   • Flu Vaccine Quad PF >36MO 10/18/2016, 10/16/2017, 2019   • Flu Vaccine Split Quad 2019   • Influenza Quad Vaccine (Inpatient) 2013   • Influenza, Unspecified 2020   • Pneumococcal Polysaccharide (PPSV23) 1997   • Tdap 2018       Allergies   Allergen Reactions   • Benadryl [Diphenhydramine] Itching   • Proventil [Albuterol] Other (See Comments)     Mouth sores          Medications:  Current Outpatient Medications on File Prior to Visit   Medication Sig   • acetaminophen (TYLENOL) 500 MG tablet Take 1,000 mg by mouth Every 8 (Eight) Hours As Needed.   • albuterol sulfate  (90 Base) MCG/ACT inhaler Inhale 2 puffs 4 (Four) Times a Day As Needed.   • apixaban (Eliquis) 5 MG  tablet tablet Take 1 tablet by mouth Every 12 (Twelve) Hours.   • arformoterol (BROVANA) 15 MCG/2ML nebulizer solution Take 2 mL by nebulization 2 (Two) Times a Day.   • aspirin 81 MG EC tablet Take 81 mg by mouth Daily.   • atorvastatin (LIPITOR) 20 MG tablet Take 1 tablet by mouth Every Night.   • azithromycin (ZITHROMAX) 250 MG tablet    • B-D UF III MINI PEN NEEDLES 31G X 5 MM misc USE AS DIRECTED WITH INSULIN   • Blood Glucose Monitoring Suppl w/Device kit 1 kit Take As Directed. Dx e11.9   • budesonide (PULMICORT) 0.5 MG/2ML nebulizer solution Take 2 mL by nebulization 2 (Two) Times a Day.   • calcium citrate (CALCITRATE) 950 (200 Ca) MG tablet TAKE 1 TABLET BY MOUTH EVERY DAY   • cefdinir (OMNICEF) 300 MG capsule Take 300 mg by mouth 2 (Two) Times a Day.   • Cholecalciferol (Vitamin D3) 50 MCG (2000 UT) tablet Take 1 tablet by mouth Daily.   • dapagliflozin (Farxiga) 5 MG tablet tablet Take 1 tablet by mouth Daily.   • dilTIAZem CD (CARDIZEM CD) 120 MG 24 hr capsule TAKE 1 CAPSULE BY MOUTH EVERY DAY   • docusate sodium (COLACE) 100 MG capsule TAKE 1 CAPSULE BY MOUTH TWICE DAILY   • doxycycline (VIBRAMYCIN) 100 MG capsule Take 1 capsule by mouth 2 (Two) Times a Day.   • DULoxetine (CYMBALTA) 60 MG capsule Take 1 capsule by mouth 2 (Two) Times a Day.   • exenatide er (Bydureon BCise) 2 MG/0.85ML auto-injector injection Inject 0.85 mL under the skin into the appropriate area as directed 1 (One) Time Per Week.   • famotidine (PEPCID) 40 MG tablet Take 1 tablet by mouth Daily.   • ferrous gluconate (FERGON) 324 MG tablet Take 1 tablet by mouth Daily With Breakfast.   • finasteride (PROSCAR) 5 MG tablet Take 5 mg by mouth.   • folic acid (FOLVITE) 1 MG tablet Take 1 mg by mouth Daily.   • furosemide (LASIX) 40 MG tablet Take 1 tablet by mouth Daily. (Patient taking differently: Take 40 mg by mouth 2 (Two) Times a Day.)   • gabapentin (NEURONTIN) 300 MG capsule Take 1 capsule by mouth every night at bedtime.   •  glucose blood test strip 1 each by Other route 4 (Four) Times a Day With Meals & at Bedtime. Use as instructed dx e 11.9   • guaifenesin-dextromethorphan (MUCINEX DM)  MG tablet sustained-release 12 hour tablet Take 1 tablet by mouth 2 (Two) Times a Day As Needed (congestion).   • hydrALAZINE (APRESOLINE) 25 MG tablet Take 2 tablets by mouth 3 (Three) Times a Day.   • Insulin Glargine (LANTUS SOLOSTAR) 100 UNIT/ML injection pen Inject 40 Units under the skin into the appropriate area as directed Daily.   • Insulin Lispro (ADMELOG SOLOSTAR SC) Inject  under the skin into the appropriate area as directed. Per SSI, if BS <150 = 0 units, 151-180= 1 unit, 181-210= 2units, 211-240= 3units, 241-270= 4units, 271-300= 5units, 301-330=6 untis, 331-390= 8units, <390 give 9units   • ipratropium-albuterol (DUO-NEB) 0.5-2.5 mg/3 ml nebulizer Take 3 mL by nebulization Every 4 (Four) Hours As Needed.   • losartan (COZAAR) 50 MG tablet    • magnesium oxide (MAGOX) 400 (241.3 Mg) MG tablet tablet Take 400 mg by mouth 2 (Two) Times a Day.   • metoprolol tartrate (LOPRESSOR) 100 MG tablet Take 1 tablet by mouth 2 (Two) Times a Day.   • montelukast (SINGULAIR) 10 MG tablet Take 10 mg by mouth Every Night.   • mupirocin (Bactroban Nasal) 2 % nasal ointment into the nostril(s) as directed by provider 2 (Two) Times a Day.   • O2 (OXYGEN) 2 Liter O2 - CONTINUOUS (route: Oxygen)   • potassium chloride 10 MEQ CR tablet Take 1 po 3 days a week with lasix. (Patient taking differently: Take 10 mEq by mouth 3 (Three) Times a Week. Take 1 po 3 days a week with lasix.)   • predniSONE (DELTASONE) 20 MG tablet    • tamsulosin (FLOMAX) 0.4 MG capsule 24 hr capsule Take 1 capsule by mouth Every Evening.   • traZODone (DESYREL) 100 MG tablet Take 1 tablet by mouth Every Night.   • TRUEplus Lancets 28G misc USE AS DIRECTED     No current facility-administered medications on file prior to visit.       Vital Signs:   BP 98/55 (BP Location: Right arm,  "Patient Position: Sitting, Cuff Size: Adult) Comment: manual  Pulse 74   Temp 97.7 °F (36.5 °C) (Oral)   Ht 175.3 cm (69\")   Wt 123 kg (272 lb)   SpO2 96% Comment: 2 liters of o2  BMI 40.17 kg/m²       Physical Exam:  Physical Exam  Vitals and nursing note reviewed.   Constitutional:       General: He is not in acute distress.     Appearance: Normal appearance. He is not ill-appearing, toxic-appearing or diaphoretic.   HENT:      Head: Normocephalic and atraumatic.      Right Ear: Tympanic membrane, ear canal and external ear normal.      Left Ear: Tympanic membrane, ear canal and external ear normal.      Nose: No congestion or rhinorrhea.      Mouth/Throat:      Mouth: Mucous membranes are moist.      Pharynx: Oropharynx is clear. No oropharyngeal exudate or posterior oropharyngeal erythema.   Eyes:      Extraocular Movements: Extraocular movements intact.      Conjunctiva/sclera: Conjunctivae normal.      Pupils: Pupils are equal, round, and reactive to light.   Cardiovascular:      Rate and Rhythm: Normal rate and regular rhythm.      Heart sounds: Normal heart sounds.   Pulmonary:      Effort: Pulmonary effort is normal.      Breath sounds: Normal breath sounds. No wheezing, rhonchi or rales.   Abdominal:      General: Abdomen is flat.      Palpations: Abdomen is soft.   Musculoskeletal:      Cervical back: Neck supple. No rigidity.   Lymphadenopathy:      Cervical: No cervical adenopathy.   Skin:     General: Skin is warm and dry.   Neurological:      Mental Status: He is alert and oriented to person, place, and time.   Psychiatric:         Mood and Affect: Mood normal.         Behavior: Behavior normal.         Result Review      The following data was reviewed by Kimmy Riley MD on 05/10/2022.  Lab Results   Component Value Date    WBC 13.86 (H) 04/11/2022    HGB 11.2 (L) 04/11/2022    HCT 35.0 (L) 04/11/2022    MCV 86.2 04/11/2022     04/11/2022     Lab Results   Component Value Date    " GLUCOSE 275 (H) 04/11/2022    BUN 62 (H) 04/11/2022    CREATININE 1.89 (H) 04/11/2022    EGFRIFNONA 46 (L) 07/27/2021    BCR 32.8 (H) 04/11/2022    K 4.1 04/11/2022    CO2 28.4 04/11/2022    CALCIUM 9.1 04/11/2022    ALBUMIN 3.50 04/11/2022    LABIL2 0.9 (L) 09/30/2020    AST 18 04/11/2022    ALT 20 04/11/2022     Lab Results   Component Value Date    CHOL 131 03/17/2022    CHLPL 165 08/26/2020    TRIG 69 03/17/2022    HDL 65 (H) 03/17/2022    LDL 52 03/17/2022     Lab Results   Component Value Date    TSH 0.580 03/17/2020     Lab Results   Component Value Date    HGBA1C 8.30 (H) 03/17/2022     Lab Results   Component Value Date    PSA 0.725 03/17/2022                       Assessment and Plan:          Diagnoses and all orders for this visit:    1. Chronic obstructive pulmonary disease with acute exacerbation (HCC) (Primary)    2. Stage 3a chronic kidney disease (HCC)    3. Chronic diastolic (congestive) heart failure (HCC)    4. Essential (primary) hypertension    5. Paroxysmal atrial fibrillation (HCC)    6. Type 1 diabetes mellitus with stage 3a chronic kidney disease (HCC)    7. Obstructive sleep apnea (adult) (pediatric)    Other orders  -     metOLazone (ZAROXOLYN) 2.5 MG tablet; 1 po daily x 3 days, then 1 po 3 days a week (M/W/F)  Dispense: 20 tablet; Refill: 1      Gómez is  fluid overloaded today.  He is moving air well in his lungs.  His lower extremity edema is not pitting.  He is to be on 80 mg of Lasix a day.  For the next 3 days I will add 40 mg at 2 PM and start him on Zaroxolyn 2.5 mg daily for 3 days and then 1 pill 3 days a week from here on out.  With his low blood pressures I think he is on too much blood pressure medication so I am going to stop his hydralazine 50 mg twice daily and change it to 25 mg twice daily as needed blood pressure greater than 150/100.  He needs to continue his sliding scale insulin with his sugars as well as his long-acting insulin.  He is also to take the cefdinir and  prednisone as prescribed by the hospital which she has not started yet.  Overall he has generalized weakness and really is poorly compliant with medications and taking care of himself.  Is why he keeps ending up in the hospital.  Advised nursing home placement for rehab and nursing care for at least the next 2 weeks if not more on a longstanding basis.  Patient refuses and is adamant he will not go into the nursing home.  He will receive much better care if he was in a nursing home situation.  His wife tries and does everything she can that time he is going to do what he wants to do.    Follow Up   Return in about 1 week (around 5/17/2022), or if symptoms worsen or fail to improve.

## 2022-05-16 ENCOUNTER — TELEPHONE (OUTPATIENT)
Dept: FAMILY MEDICINE CLINIC | Age: 57
End: 2022-05-16

## 2022-05-16 NOTE — TELEPHONE ENCOUNTER
He wants to be admitted to [facility] for rehab goal of  30 days I spoke with [facility] and they are going to readmit pt

## 2022-05-31 ENCOUNTER — READMISSION MANAGEMENT (OUTPATIENT)
Dept: CALL CENTER | Facility: HOSPITAL | Age: 57
End: 2022-05-31

## 2022-05-31 NOTE — OUTREACH NOTE
Prep Survey    Flowsheet Row Responses   [facility] facility patient discharged from? Non-BH   Is LACE score < 7 ? Non-BH Discharge   Emergency Room discharge w/ pulse ox? No   Eligibility TCM   H[facility]    Date of Admission 05/25/22   Date of Discharge 05/31/22   Discharge diagnosis chf, pneumonia    Does the patient have one of the following disease processes/diagnoses(primary or secondary)? Other   Prep survey completed? Yes          AMALIA DOBBINS - Registered Nurse

## 2022-06-01 ENCOUNTER — TRANSITIONAL CARE MANAGEMENT TELEPHONE ENCOUNTER (OUTPATIENT)
Dept: CALL CENTER | Facility: HOSPITAL | Age: 57
End: 2022-06-01

## 2022-06-01 NOTE — OUTREACH NOTE
Call Center TCM Note    Flowsheet Row Responses   [facility] patient discharged from? Non-  [[facility]]   Does the patient have one of the following disease processes/diagnoses(primary or secondary)? Other   TCM attempt successful? Yes   Call start time 0939   Call end time 0945   Discharge diagnosis chf, pneumonia    Person spoke with today (if not patient) and relationship catherine Mcclure   Meds reviewed with patient/caregiver? Yes   Does the patient have all medications ordered at discharge? Yes   Is the patient taking all medications as directed (includes completed medication regime)? Yes   Medication comments Wife reports patient had some medications added and some removed at discharge,  communicated her plans to bring all prescriptions and paperwork to f/u visit as she wants to review with PCP as she has concerns with interactions as he has many medications   Does the patient have a primary care provider?  Yes   Does the patient have an appointment with their PCP within 7 days of discharge? Yes   Comments regarding PCP Hospital PCP FOLLOW UP APPOINTMENT IS 6/2/22@1215pm   Has the patient kept scheduled appointments due by today? N/A   What is the Home health agency?  [facility]   Has home health visited the patient within 72 hours of discharge? Unsure   DME comments Encouraged use of of O2, Bipap-patient has purchased a CPAP but wife unable to verbalize readings at time of call   Psychosocial issues? No   Did the patient receive a copy of their discharge instructions? Yes   Nursing interventions --  [discharge instructions from [facility]]   What is the patient's perception of their health status since discharge? Same  [Wife reports continued leg edema even though he was diuresed while inpatient,  he has no scale at home for QD weights.  Reports he had SOA last pm and she had to remind him to use Bipap as ordered.  ]   Is the patient/caregiver able to teach back signs and symptoms related to disease process for  when to call PCP? Yes   Is the patient/caregiver able to teach back signs and symptoms related to disease process for when to call 911? Yes   Additional teach back comments BG this am 48--discussed insulin dosing, wife uncertain.  Discussed pm snack and wife reports he omitted last night as he went to bed after dinner.  She was unable to verbalize his readings now after eating as not home.  Encouraged HS snack and to bring BG readings to f/u appt tomorrow for PCP review.  Encouraged to monitor fluid and respiratory status--notify MD for worsening condition.    TCM call completed? Yes          Carmen Zurita RN    6/1/2022, 09:53 EDT

## 2022-06-02 ENCOUNTER — OFFICE VISIT (OUTPATIENT)
Dept: FAMILY MEDICINE CLINIC | Age: 57
End: 2022-06-02

## 2022-06-02 ENCOUNTER — TELEPHONE (OUTPATIENT)
Dept: FAMILY MEDICINE CLINIC | Age: 57
End: 2022-06-02

## 2022-06-02 VITALS
OXYGEN SATURATION: 93 % | SYSTOLIC BLOOD PRESSURE: 123 MMHG | DIASTOLIC BLOOD PRESSURE: 65 MMHG | HEART RATE: 70 BPM | BODY MASS INDEX: 40.17 KG/M2 | HEIGHT: 69 IN

## 2022-06-02 DIAGNOSIS — I10 ESSENTIAL (PRIMARY) HYPERTENSION: ICD-10-CM

## 2022-06-02 DIAGNOSIS — J44.1 CHRONIC OBSTRUCTIVE PULMONARY DISEASE WITH ACUTE EXACERBATION: ICD-10-CM

## 2022-06-02 DIAGNOSIS — E10.22 TYPE 1 DIABETES MELLITUS WITH STAGE 3A CHRONIC KIDNEY DISEASE: ICD-10-CM

## 2022-06-02 DIAGNOSIS — I10 ESSENTIAL HYPERTENSION: ICD-10-CM

## 2022-06-02 DIAGNOSIS — R39.11 BENIGN PROSTATIC HYPERPLASIA WITH URINARY HESITANCY: ICD-10-CM

## 2022-06-02 DIAGNOSIS — I50.32 CHRONIC DIASTOLIC (CONGESTIVE) HEART FAILURE: Primary | ICD-10-CM

## 2022-06-02 DIAGNOSIS — N18.31 TYPE 1 DIABETES MELLITUS WITH STAGE 3A CHRONIC KIDNEY DISEASE: ICD-10-CM

## 2022-06-02 DIAGNOSIS — I48.0 PAROXYSMAL ATRIAL FIBRILLATION: ICD-10-CM

## 2022-06-02 DIAGNOSIS — F32.4 MAJOR DEPRESSIVE DISORDER WITH SINGLE EPISODE, IN PARTIAL REMISSION: ICD-10-CM

## 2022-06-02 DIAGNOSIS — N40.1 BENIGN PROSTATIC HYPERPLASIA WITH URINARY HESITANCY: ICD-10-CM

## 2022-06-02 PROCEDURE — 99214 OFFICE O/P EST MOD 30 MIN: CPT | Performed by: FAMILY MEDICINE

## 2022-06-02 RX ORDER — IBUPROFEN 200 MG
950 CAPSULE ORAL DAILY
Qty: 90 TABLET | Refills: 3 | Status: SHIPPED | OUTPATIENT
Start: 2022-06-02 | End: 2022-06-09 | Stop reason: SDUPTHER

## 2022-06-02 RX ORDER — HYDRALAZINE HYDROCHLORIDE 25 MG/1
50 TABLET, FILM COATED ORAL 3 TIMES DAILY
Qty: 180 TABLET | Refills: 5 | Status: SHIPPED | OUTPATIENT
Start: 2022-06-02 | End: 2022-06-08

## 2022-06-02 RX ORDER — DULOXETIN HYDROCHLORIDE 60 MG/1
60 CAPSULE, DELAYED RELEASE ORAL 2 TIMES DAILY
Qty: 180 CAPSULE | Refills: 1 | Status: SHIPPED | OUTPATIENT
Start: 2022-06-02 | End: 2022-06-09 | Stop reason: SDUPTHER

## 2022-06-02 RX ORDER — OXYCODONE HYDROCHLORIDE 5 MG/1
5 CAPSULE ORAL EVERY 4 HOURS PRN
COMMUNITY
End: 2022-06-08 | Stop reason: ALTCHOICE

## 2022-06-02 RX ORDER — NIFEDIPINE 30 MG/1
30 TABLET, EXTENDED RELEASE ORAL DAILY
COMMUNITY
End: 2022-06-09 | Stop reason: SDUPTHER

## 2022-06-02 RX ORDER — FLURBIPROFEN SODIUM 0.3 MG/ML
1 SOLUTION/ DROPS OPHTHALMIC SEE ADMIN INSTRUCTIONS
Qty: 100 EACH | Refills: 3 | Status: SHIPPED | OUTPATIENT
Start: 2022-06-02 | End: 2022-09-27

## 2022-06-02 NOTE — PROGRESS NOTES
Preston Wallis presents to [facility].    Chief Complaint: CHF follow up, PRATIBHA    Subjective       History of Present Illness:  HPI     Preston Wallis is a 56 y.o. male. He had been in hospital at [facility] last week seen by Dr. Prabhakar for CHF with fluid overload on 5/25/22 from [facility].  Gómez states while in the NH he didn't get his meds for 3 days, was not monitored for BP or weights at all.  He has been in the hospital multiple times the past few months for chronic obstructive pulmonary disease exacerbation with acute on chronic hypoxic and hypercapnic respiratory failure as well as acute on chronic heart failure with diastolic dysfunction. He is on cpap and oxygen 2 liters NC.  He also has underlying hypertension, diabetes, GERD, hypercholesterolemia, sleep apnea, stage 3 renal failure as well as recurrent pneumonia and h/o MRSA and pseudomonas pneumonia.  In addition,  He recently saw Dr Martínez and Gómez states nothing was done with him.  He was told in the hospital that his prostate was enlarged and he is having trouble urinating.     D/C meds: pepcid, metoprolol, hydralazine, trazodone, insulin, gabapentin, marcos, brovana, pulmicort, farxiga, mucines, duonebs, vit d, eliquis, folic acid, proscar, singulair, flomax, procardia.  NWELY ON BUMEX, FLORANEX AND BACTRIM.      STOPPED PREDNISONE, LASIX CEFDINIR, MUPIROCIN, LOSARTAN, K    BS running low at home.  He is on 40 units basaglar.        Review of Systems:  Review of Systems   Constitutional: Negative for chills, fatigue and fever.   HENT: Negative for congestion, ear discharge and sore throat.    Respiratory: Negative for shortness of breath.    Cardiovascular: Negative for chest pain.   Gastrointestinal: Negative for abdominal pain, constipation, diarrhea, nausea, vomiting and GERD.   Genitourinary: Negative for flank pain.   Neurological: Negative for dizziness and headache.   Psychiatric/Behavioral: Negative for depressed mood.         Objective   Medical History:  Past Medical History:   • Age-related cognitive decline   • Allergic contact dermatitis   • Allergies   • Anemia   • Bronchiectasis with acute lower respiratory infection ([facility])   • Chest pain   • Chronic bronchitis ([facility])   • Chronic diastolic (congestive) heart failure ([facility])   • Chronic kidney disease   • Chronic respiratory failure with hypoxia ([facility])   • COPD (chronic obstructive pulmonary disease) ([facility])   • COPD with acute exacerbation ([facility])   • Cough   • Dependence on supplemental oxygen   • Eczema   • Erectile dysfunction    due to organic reasons   • Essential (primary) hypertension   • Fracture    closed fracture of other tarsal and metatarsal bones   • GERD without esophagitis   • High risk medication use   • Hypercholesteremia   • Hypomagnesemia   • Insomnia   • Low back pain   • Major depressive disorder   • Major depressive disorder   • Morbid (severe) obesity due to excess calories ([facility])   • MRSA pneumonia ([facility])   • Muscle weakness   • 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 without evidence of necrosis ([facility])   • Obstructive sleep apnea (adult) (pediatric)   • Other forms of dyspnea   • Other long term (current) drug therapy   • Other pulmonary embolism without acute cor pulmonale ([facility])   • Other specified noninfective gastroenteritis and colitis   • Other spondylosis, lumbar region   • Pain in both knees   • Paroxysmal atrial fibrillation ([facility])   • Peripheral neuropathy    attributed to type 2 diabetes   • Pneumonia, unspecified organism   • Polyneuropathy   • Rash and other nonspecific skin eruption   • Syncope and collapse   • Tachycardia   • Type 1 diabetes mellitus with diabetic chronic kidney disease ([facility])   • Type 2 diabetes mellitus ([facility])   • Unspecified fall, initial encounter     Past Surgical History:   • CHOLECYSTECTOMY   • KNEE SURGERY   • OTHER SURGICAL HISTORY    venous port   • TONSILLECTOMY AND ADENOIDECTOMY       Family History   Problem Relation Age of Onset   • Coronary artery disease Mother    • Hypertension Mother    • Diabetes type II Mother    • Asthma Father    • Cancer Sister      Social History     Tobacco Use   • Smoking status: Former Smoker     Packs/day: 1.00     Years: 6.00     Pack years: 6.00     Types: Cigarettes     Quit date:      Years since quittin.4   • Smokeless tobacco: Never Used   Substance Use Topics   • Alcohol use: Not Currently       Health Maintenance Due   Topic Date Due   • COLORECTAL CANCER SCREENING  Never done   • ANNUAL PHYSICAL  Never done   • COVID-19 Vaccine (1) Never done   • Hepatitis B (1 of 3 - Risk 3-dose series) Never done   • Pneumococcal Vaccine 0-64 (2 - PCV) 1998   • ZOSTER VACCINE (1 of 2) Never done   • DIABETIC FOOT EXAM  Never done   • DIABETIC EYE EXAM  2021        Immunization History   Administered Date(s) Administered   • Flu Vaccine Quad PF >36MO 10/18/2016, 10/16/2017, 2019   • Flu Vaccine Split Quad 2019   • Influenza Quad Vaccine (Inpatient) 2013   • Influenza, Unspecified 2020   • Pneumococcal Polysaccharide (PPSV23) 1997   • Tdap 2018       Allergies   Allergen Reactions   • Benadryl [Diphenhydramine] Itching   • Proventil [Albuterol] Other (See Comments)     Mouth sores          Medications:  Current Outpatient Medications on File Prior to Visit   Medication Sig   • acetaminophen (TYLENOL) 500 MG tablet Take 1,000 mg by mouth Every 8 (Eight) Hours As Needed.   • albuterol sulfate  (90 Base) MCG/ACT inhaler Inhale 2 puffs 4 (Four) Times a Day As Needed.   • apixaban (Eliquis) 5 MG tablet tablet Take 1 tablet by mouth Every 12 (Twelve) Hours.   • arformoterol (BROVANA) 15 MCG/2ML nebulizer solution Take 2 mL by nebulization 2 (Two) Times a Day.   • aspirin 81 MG EC tablet Take 81 mg by mouth Daily.   • atorvastatin (LIPITOR) 20 MG tablet Take 1 tablet by mouth Every Night.   • Blood Glucose  Monitoring Suppl w/Device kit 1 kit Take As Directed. Dx e11.9   • budesonide (PULMICORT) 0.5 MG/2ML nebulizer solution Take 2 mL by nebulization 2 (Two) Times a Day.   • Cholecalciferol (Vitamin D3) 50 MCG (2000 UT) tablet Take 1 tablet by mouth Daily.   • dapagliflozin (Farxiga) 5 MG tablet tablet Take 1 tablet by mouth Daily.   • dilTIAZem CD (CARDIZEM CD) 120 MG 24 hr capsule TAKE 1 CAPSULE BY MOUTH EVERY DAY   • docusate sodium (COLACE) 100 MG capsule TAKE 1 CAPSULE BY MOUTH TWICE DAILY   • exenatide er (Bydureon BCise) 2 MG/0.85ML auto-injector injection Inject 0.85 mL under the skin into the appropriate area as directed 1 (One) Time Per Week.   • famotidine (PEPCID) 40 MG tablet Take 1 tablet by mouth Daily.   • ferrous gluconate (FERGON) 324 MG tablet Take 1 tablet by mouth Daily With Breakfast.   • finasteride (PROSCAR) 5 MG tablet Take 5 mg by mouth.   • folic acid (FOLVITE) 1 MG tablet Take 1 mg by mouth Daily.   • gabapentin (NEURONTIN) 300 MG capsule Take 1 capsule by mouth every night at bedtime.   • glucose blood test strip 1 each by Other route 4 (Four) Times a Day With Meals & at Bedtime. Use as instructed dx e 11.9   • guaifenesin-dextromethorphan (MUCINEX DM)  MG tablet sustained-release 12 hour tablet Take 1 tablet by mouth 2 (Two) Times a Day As Needed (congestion).   • Insulin Glargine (LANTUS SOLOSTAR) 100 UNIT/ML injection pen Inject 40 Units under the skin into the appropriate area as directed Daily.   • Insulin Lispro (ADMELOG SOLOSTAR SC) Inject  under the skin into the appropriate area as directed. Per SSI, if BS <150 = 0 units, 151-180= 1 unit, 181-210= 2units, 211-240= 3units, 241-270= 4units, 271-300= 5units, 301-330=6 untis, 331-390= 8units, <390 give 9units   • ipratropium-albuterol (DUO-NEB) 0.5-2.5 mg/3 ml nebulizer Take 3 mL by nebulization Every 4 (Four) Hours As Needed.   • metoprolol tartrate (LOPRESSOR) 100 MG tablet Take 1 tablet by mouth 2 (Two) Times a Day.   •  montelukast (SINGULAIR) 10 MG tablet Take 10 mg by mouth Every Night.   • NIFEdipine XL (PROCARDIA XL) 30 MG 24 hr tablet Take 30 mg by mouth Daily.   • O2 (OXYGEN) 2 Liter O2 - CONTINUOUS (route: Oxygen)   • oxyCODONE (OXY-IR) 5 MG capsule Take 5 mg by mouth Every 4 (Four) Hours As Needed for Moderate Pain .   • tamsulosin (FLOMAX) 0.4 MG capsule 24 hr capsule Take 1 capsule by mouth Every Evening.   • traZODone (DESYREL) 100 MG tablet Take 1 tablet by mouth Every Night.   • TRUEplus Lancets 28G misc USE AS DIRECTED   • [DISCONTINUED] B-D UF III MINI PEN NEEDLES 31G X 5 MM misc USE AS DIRECTED WITH INSULIN   • [DISCONTINUED] calcium citrate (CALCITRATE) 950 (200 Ca) MG tablet TAKE 1 TABLET BY MOUTH EVERY DAY   • [DISCONTINUED] cefdinir (OMNICEF) 300 MG capsule Take 300 mg by mouth 2 (Two) Times a Day.   • [DISCONTINUED] DULoxetine (CYMBALTA) 60 MG capsule Take 1 capsule by mouth 2 (Two) Times a Day.   • [DISCONTINUED] hydrALAZINE (APRESOLINE) 25 MG tablet take 2 tablets BY MOUTH THREE TIMES DAILY   • [DISCONTINUED] azithromycin (ZITHROMAX) 250 MG tablet    • [DISCONTINUED] doxycycline (VIBRAMYCIN) 100 MG capsule Take 1 capsule by mouth 2 (Two) Times a Day.   • [DISCONTINUED] furosemide (LASIX) 40 MG tablet Take 1 tablet by mouth Daily. (Patient taking differently: Take 40 mg by mouth 2 (Two) Times a Day.)   • [DISCONTINUED] losartan (COZAAR) 50 MG tablet    • [DISCONTINUED] magnesium oxide (MAGOX) 400 (241.3 Mg) MG tablet tablet Take 400 mg by mouth 2 (Two) Times a Day.   • [DISCONTINUED] metOLazone (ZAROXOLYN) 2.5 MG tablet 1 po daily x 3 days, then 1 po 3 days a week (M/W/F)   • [DISCONTINUED] mupirocin (Bactroban Nasal) 2 % nasal ointment into the nostril(s) as directed by provider 2 (Two) Times a Day.   • [DISCONTINUED] potassium chloride 10 MEQ CR tablet Take 1 po 3 days a week with lasix. (Patient taking differently: Take 10 mEq by mouth 3 (Three) Times a Week. Take 1 po 3 days a week with lasix.)   •  "[DISCONTINUED] predniSONE (DELTASONE) 20 MG tablet      No current facility-administered medications on file prior to visit.       Vital Signs:   /65 (BP Location: Left arm, Patient Position: Sitting, Cuff Size: Large Adult)   Pulse 70   Ht 175.3 cm (69\")   SpO2 93% Comment: 2 Liters of 02  BMI 40.17 kg/m²       Physical Exam:  Physical Exam  Vitals and nursing note reviewed.   Constitutional:       General: He is not in acute distress.     Appearance: Normal appearance. He is not ill-appearing, toxic-appearing or diaphoretic.   HENT:      Head: Normocephalic and atraumatic.      Nose: No congestion or rhinorrhea.      Mouth/Throat:      Mouth: Mucous membranes are moist.      Pharynx: Oropharynx is clear. No oropharyngeal exudate or posterior oropharyngeal erythema.   Eyes:      Extraocular Movements: Extraocular movements intact.      Conjunctiva/sclera: Conjunctivae normal.      Pupils: Pupils are equal, round, and reactive to light.   Cardiovascular:      Rate and Rhythm: Normal rate and regular rhythm.      Heart sounds: Normal heart sounds.   Pulmonary:      Effort: Pulmonary effort is normal.      Breath sounds: Normal breath sounds. No wheezing, rhonchi or rales.   Abdominal:      General: Abdomen is flat.      Palpations: Abdomen is soft.   Musculoskeletal:      Cervical back: Neck supple. No rigidity.   Lymphadenopathy:      Cervical: No cervical adenopathy.   Skin:     General: Skin is warm and dry.   Neurological:      Mental Status: He is alert and oriented to person, place, and time.   Psychiatric:         Mood and Affect: Mood normal.         Behavior: Behavior normal.         Result Review      The following data was reviewed by Kimmy Riley MD on 06/02/2022.  Lab Results   Component Value Date    WBC 13.86 (H) 04/11/2022    HGB 11.2 (L) 04/11/2022    HCT 35.0 (L) 04/11/2022    MCV 86.2 04/11/2022     04/11/2022     Lab Results   Component Value Date    GLUCOSE 275 (H) " 04/11/2022    BUN 62 (H) 04/11/2022    CREATININE 1.89 (H) 04/11/2022    EGFRIFNONA 46 (L) 07/27/2021    BCR 32.8 (H) 04/11/2022    K 4.1 04/11/2022    CO2 28.4 04/11/2022    CALCIUM 9.1 04/11/2022    ALBUMIN 3.50 04/11/2022    LABIL2 0.9 (L) 09/30/2020    AST 18 04/11/2022    ALT 20 04/11/2022     Lab Results   Component Value Date    CHOL 131 03/17/2022    CHLPL 165 08/26/2020    TRIG 69 03/17/2022    HDL 65 (H) 03/17/2022    LDL 52 03/17/2022     Lab Results   Component Value Date    TSH 0.580 03/17/2020     Lab Results   Component Value Date    HGBA1C 8.30 (H) 03/17/2022     Lab Results   Component Value Date    PSA 0.725 03/17/2022                       Assessment and Plan:          Diagnoses and all orders for this visit:    1. Chronic diastolic (congestive) heart failure (HCC) (Primary)  Comments:  needs to start daily wts and call if > 2 # wt gain in a day or >5 # in a week. Cardiology referral for re eval  Orders:  -     Ambulatory Referral to Cardiology    2. Chronic obstructive pulmonary disease with acute exacerbation (HCC)  Comments:  stable, cont all nebs/inhalers    3. Essential (primary) hypertension  Comments:  stable and well controlled with recent med changes    4. Type 1 diabetes mellitus with stage 3a chronic kidney disease (HCC)  Comments:  BS running low, decrease basaglar to 35 units valerio  Orders:  -     Insulin Pen Needle (B-D UF III MINI PEN NEEDLES) 31G X 5 MM misc; Inject 1 each under the skin into the appropriate area as directed See Admin Instructions. with insulin  Dispense: 100 each; Refill: 3    5. Paroxysmal atrial fibrillation (HCC)  Comments:  NSR today, on eliquis    6. Benign prostatic hyperplasia with urinary hesitancy  Comments:  newly on proscar, already on flomax, needs urology eval  Orders:  -     Ambulatory Referral to Urology    7. Major depressive disorder with single episode, in partial remission (HCC)  -     DULoxetine (CYMBALTA) 60 MG capsule; Take 1 capsule by mouth  2 (Two) Times a Day.  Dispense: 180 capsule; Refill: 1    8. Essential hypertension  -     hydrALAZINE (APRESOLINE) 25 MG tablet; Take 2 tablets by mouth 3 (Three) Times a Day.  Dispense: 180 tablet; Refill: 5    Other orders  -     calcium citrate (CALCITRATE) 950 (200 Ca) MG tablet; Take 1 tablet by mouth Daily.  Dispense: 90 tablet; Refill: 3      Time he is overall stable today and seems to be improving.  No changes are needed in his current medications.  His medications were reviewed and meds refilled as needed.  He needs a cardiologist to start following him for this congestive heart failure with acute exacerbations.  He already sees his pulmonologist routinely.  He is to continue all his current inhalers.  His diabetes is doing well and fact he is having low blood sugar so I am decreasing his Basaglar to 35 units daily and would like a blood sugar diary in a couple weeks.  Blood pressure is well controlled.  Follow Up   Return in about 1 month (around 7/2/2022) for Recheck.

## 2022-06-02 NOTE — TELEPHONE ENCOUNTER
Caller: INGA URIARTE    Relationship: Emergency Contact    Best call back number: [phone]    Requested Prescriptions:   Requested Prescriptions      No prescriptions requested or ordered in this encounter        Pharmacy where request should be sent: [facility] [address] [phone] [facility] [phone] FX     Additional details provided by patient:       THE PATIENT'S WIFE SAID  THE PATIENT IS NEEDING HIS ALBUTEROL NEBULIZER REFILLED. SHE WASN'T SURE OF THE NAME          Does the patient have less than a 3 day supply:  [x] Yes  [] No    Hilda Mcpherson Rep   06/02/22 16:55 EDT

## 2022-06-03 RX ORDER — BUDESONIDE 0.5 MG/2ML
0.5 INHALANT ORAL
Qty: 120 ML | Refills: 0 | Status: SHIPPED | OUTPATIENT
Start: 2022-06-03 | End: 2022-08-25

## 2022-06-03 RX ORDER — ALBUTEROL SULFATE 90 UG/1
2 AEROSOL, METERED RESPIRATORY (INHALATION) 4 TIMES DAILY PRN
Qty: 18 G | Refills: 2 | Status: SHIPPED | OUTPATIENT
Start: 2022-06-03 | End: 2022-08-25 | Stop reason: SDUPTHER

## 2022-06-03 RX ORDER — ARFORMOTEROL TARTRATE 15 UG/2ML
15 SOLUTION RESPIRATORY (INHALATION)
Qty: 120 ML | Refills: 0 | Status: SHIPPED | OUTPATIENT
Start: 2022-06-03 | End: 2022-06-03 | Stop reason: ALTCHOICE

## 2022-06-03 RX ORDER — IPRATROPIUM BROMIDE AND ALBUTEROL SULFATE 2.5; .5 MG/3ML; MG/3ML
3 SOLUTION RESPIRATORY (INHALATION) EVERY 4 HOURS PRN
Qty: 360 ML | Refills: 0 | Status: SHIPPED | OUTPATIENT
Start: 2022-06-03 | End: 2022-08-25 | Stop reason: SDUPTHER

## 2022-06-03 NOTE — TELEPHONE ENCOUNTER
Please clarify inhalers d/c summary from [facility] and your note are not clear and the bovana is not covered and will need a PA if you continue insurance will prefer an advair or breo or symbicort  per pharmacy

## 2022-06-06 ENCOUNTER — READMISSION MANAGEMENT (OUTPATIENT)
Dept: CALL CENTER | Facility: HOSPITAL | Age: 57
End: 2022-06-06

## 2022-06-06 NOTE — OUTREACH NOTE
Prep Survey    Flowsheet Row Responses   [facility] facility patient discharged from? Non-BH   Is LACE score < 7 ? Non-BH Discharge   Emergency Room discharge w/ pulse ox? No   Eligibility TCM   Hospital [facility]   Date of Admission 06/03/22   Date of Discharge 06/05/22   Discharge diagnosis CHF/COPD   Does the patient have one of the following disease processes/diagnoses(primary or secondary)? CHF   Prep survey completed? Yes          ANGY Palencia Registered Nurse

## 2022-06-07 ENCOUNTER — TRANSITIONAL CARE MANAGEMENT TELEPHONE ENCOUNTER (OUTPATIENT)
Dept: CALL CENTER | Facility: HOSPITAL | Age: 57
End: 2022-06-07

## 2022-06-07 NOTE — OUTREACH NOTE
Call Center TCM Note    Flowsheet Row Responses   [facility] patient discharged from? Non-BH  [[facility]]   Does the patient have one of the following disease processes/diagnoses(primary or secondary)? CHF   TCM attempt successful? Yes   Call start time [phone]   Call end time 1102   Discharge diagnosis CHF   Is patient permission given to speak with other caregiver? Yes   List who call center can speak with wife, Kami   Person spoke with today (if not patient) and relationship patient   Meds reviewed with patient/caregiver? Yes  [Patient will bring discharge papers with updated med list to appt tomorrow. ]   Does the patient have all medications ordered at discharge? Yes   Is the patient taking all medications as directed (includes completed medication regime)? Yes   Does the patient have a primary care provider?  Yes   Does the patient have an appointment with their PCP within 7 days of discharge? Yes   Comments regarding PCP PCP Dr Riley. Hospital follow up scheduled for tomorrow 6/8/22  10am with Lilo Braggr CON   Has the patient kept scheduled appointments due by today? N/A   Has home health visited the patient within 72 hours of discharge? Call prior to 72 hours   DME comments Patient has home O2, CPAP   Psychosocial issues? No   Did the patient receive a copy of their discharge instructions? Yes   Nursing interventions Reviewed instructions with patient   What is the patient's perception of their health status since discharge? Improving   Is the patient/caregiver able to teach back signs and symptoms related to disease process for when to call PCP? Yes   Is the patient/caregiver able to teach back the hierarchy of who to call/visit for symptoms/problems? PCP, Specialist, Home health nurse, Urgent Care, ED, 911 Yes   If the patient is a current smoker, are they able to teach back resources for cessation? Not a smoker   Additional teach back comments Patient verbalized understanding of daily weight  monitoring for CHF management. Patient aware to contact DR office with gain >2# overnight or >5# in a week.    TCM call completed? Yes   Wrap up additional comments Denies any further questions or needs today before f/u appt tomorrow.           Mishel Hightower RN    6/7/2022, 11:02 EDT

## 2022-06-08 ENCOUNTER — PATIENT OUTREACH (OUTPATIENT)
Dept: CASE MANAGEMENT | Facility: OTHER | Age: 57
End: 2022-06-08

## 2022-06-08 ENCOUNTER — TELEPHONE (OUTPATIENT)
Dept: FAMILY MEDICINE CLINIC | Age: 57
End: 2022-06-08

## 2022-06-08 ENCOUNTER — REFERRAL TRIAGE (OUTPATIENT)
Dept: CASE MANAGEMENT | Facility: OTHER | Age: 57
End: 2022-06-08

## 2022-06-08 DIAGNOSIS — E10.22 TYPE 1 DIABETES MELLITUS WITH STAGE 3A CHRONIC KIDNEY DISEASE: ICD-10-CM

## 2022-06-08 DIAGNOSIS — I10 ESSENTIAL HYPERTENSION: ICD-10-CM

## 2022-06-08 DIAGNOSIS — I50.32 CHRONIC DIASTOLIC (CONGESTIVE) HEART FAILURE: Primary | ICD-10-CM

## 2022-06-08 DIAGNOSIS — R35.1 BENIGN PROSTATIC HYPERPLASIA WITH NOCTURIA: ICD-10-CM

## 2022-06-08 DIAGNOSIS — J44.1 CHRONIC OBSTRUCTIVE PULMONARY DISEASE WITH ACUTE EXACERBATION: ICD-10-CM

## 2022-06-08 DIAGNOSIS — N40.1 BENIGN PROSTATIC HYPERPLASIA WITH NOCTURIA: ICD-10-CM

## 2022-06-08 DIAGNOSIS — N18.31 TYPE 1 DIABETES MELLITUS WITH STAGE 3A CHRONIC KIDNEY DISEASE: ICD-10-CM

## 2022-06-08 RX ORDER — TAMSULOSIN HYDROCHLORIDE 0.4 MG/1
2 CAPSULE ORAL EVERY EVENING
Qty: 90 CAPSULE | Refills: 1 | Status: SHIPPED | OUTPATIENT
Start: 2022-06-08 | End: 2022-07-06 | Stop reason: SDUPTHER

## 2022-06-08 RX ORDER — HYDRALAZINE HYDROCHLORIDE 25 MG/1
25 TABLET, FILM COATED ORAL 3 TIMES DAILY
Qty: 180 TABLET | Refills: 5 | Status: SHIPPED | OUTPATIENT
Start: 2022-06-08 | End: 2022-06-09 | Stop reason: DRUGHIGH

## 2022-06-08 NOTE — OUTREACH NOTE
AMBULATORY CASE MANAGEMENT NOTE    Name and Relationship of Patient/Support Person:  -     Gómez has had multiple hospitalizations and was asked to review chart and possible engagement in Chronic care management program.    Called Kyra,  for passport to see if she can reach out to patient to see if she can inform of any available resources.  Kyra notes that she had attempted in the past, however they were busy at the time and requested that she call back at another time.      Gómez needs his medication pre-packaged for ease and less mix up.  Concern that his medications are changed so frequently, that it would be impossible.  Possibly if he was getting medications regularly, he would not need as much medication changes.  She recommend [facility].      Medication reconciliation performed with discharge summary at [facility].  Consult note from Dr. Da Silva states to discontinue Diltiazem, however the medication was still listed in the discharge summary and the patient knew nothing of the discontinuation.    There was a question about hydrazaline dosage also, appears that the change was made in EMR in April, will verify with provider if change was made or error.    Flomax dosage change also noted, will need to get discharge summary from [facility] in May.    Having an outside hospital creates a challenge with having a consistent medication list, but overall most are the same.     Gómez needs a follow up with pulmonology and urology.     Education Documentation  No documentation found.        ALISSON GOMEZ  Ambulatory Case Management    6/8/2022, 11:41 EDT

## 2022-06-08 NOTE — TELEPHONE ENCOUNTER
Med list updated as per d/c summary pt's wife is to bring in all pill bottles to the office tomorrow to reconcile and home health is to call on next visit on Friday .  DR Morgan's office about cardiac  Med consult in the hospital said pt was to d/c diltiazem but it is listed on d/c med list .

## 2022-06-09 ENCOUNTER — TELEPHONE (OUTPATIENT)
Dept: FAMILY MEDICINE CLINIC | Age: 57
End: 2022-06-09

## 2022-06-09 ENCOUNTER — PATIENT OUTREACH (OUTPATIENT)
Dept: CASE MANAGEMENT | Facility: OTHER | Age: 57
End: 2022-06-09

## 2022-06-09 ENCOUNTER — TELEPHONE (OUTPATIENT)
Dept: UROLOGY | Facility: CLINIC | Age: 57
End: 2022-06-09

## 2022-06-09 DIAGNOSIS — M54.50 CHRONIC BILATERAL LOW BACK PAIN, UNSPECIFIED WHETHER SCIATICA PRESENT: ICD-10-CM

## 2022-06-09 DIAGNOSIS — I50.32 CHRONIC DIASTOLIC (CONGESTIVE) HEART FAILURE: Primary | ICD-10-CM

## 2022-06-09 DIAGNOSIS — Z79.4 TYPE 2 DIABETES MELLITUS WITH HYPERGLYCEMIA, WITH LONG-TERM CURRENT USE OF INSULIN: ICD-10-CM

## 2022-06-09 DIAGNOSIS — R09.81 SINUS CONGESTION: ICD-10-CM

## 2022-06-09 DIAGNOSIS — G89.29 CHRONIC BILATERAL LOW BACK PAIN, UNSPECIFIED WHETHER SCIATICA PRESENT: ICD-10-CM

## 2022-06-09 DIAGNOSIS — F32.4 MAJOR DEPRESSIVE DISORDER WITH SINGLE EPISODE, IN PARTIAL REMISSION: ICD-10-CM

## 2022-06-09 DIAGNOSIS — E11.65 TYPE 2 DIABETES MELLITUS WITH HYPERGLYCEMIA, WITH LONG-TERM CURRENT USE OF INSULIN: ICD-10-CM

## 2022-06-09 DIAGNOSIS — J44.1 COPD WITH EXACERBATION: ICD-10-CM

## 2022-06-09 RX ORDER — HYDRALAZINE HYDROCHLORIDE 50 MG/1
50 TABLET, FILM COATED ORAL 2 TIMES DAILY
Qty: 180 TABLET | Refills: 1 | Status: SHIPPED | OUTPATIENT
Start: 2022-06-09 | End: 2022-11-21

## 2022-06-09 RX ORDER — NIFEDIPINE 30 MG/1
30 TABLET, EXTENDED RELEASE ORAL EVERY MORNING
COMMUNITY
End: 2022-11-28 | Stop reason: SDUPTHER

## 2022-06-09 RX ORDER — BUMETANIDE 2 MG/1
2 TABLET ORAL DAILY
COMMUNITY
End: 2022-06-09 | Stop reason: SDUPTHER

## 2022-06-09 RX ORDER — FINASTERIDE 5 MG/1
5 TABLET, FILM COATED ORAL DAILY
Qty: 90 TABLET | Refills: 0 | Status: SHIPPED | OUTPATIENT
Start: 2022-06-09 | End: 2022-06-22 | Stop reason: SDUPTHER

## 2022-06-09 RX ORDER — NIFEDIPINE 30 MG/1
30 TABLET, EXTENDED RELEASE ORAL DAILY
Qty: 90 TABLET | Refills: 1 | Status: SHIPPED | OUTPATIENT
Start: 2022-06-09 | End: 2022-06-09

## 2022-06-09 RX ORDER — HYDRALAZINE HYDROCHLORIDE 50 MG/1
50 TABLET, FILM COATED ORAL 2 TIMES DAILY
COMMUNITY
End: 2022-06-09 | Stop reason: SDUPTHER

## 2022-06-09 RX ORDER — GABAPENTIN 300 MG/1
300 CAPSULE ORAL
Qty: 90 CAPSULE | Refills: 0 | Status: SHIPPED | OUTPATIENT
Start: 2022-06-09 | End: 2022-08-04 | Stop reason: SDUPTHER

## 2022-06-09 RX ORDER — BUMETANIDE 2 MG/1
TABLET ORAL
Qty: 1 TABLET | Refills: 0 | Status: SHIPPED | OUTPATIENT
Start: 2022-06-09 | End: 2022-06-09

## 2022-06-09 RX ORDER — FOLIC ACID 1 MG/1
1 TABLET ORAL DAILY
Qty: 90 TABLET | Refills: 1 | Status: SHIPPED | OUTPATIENT
Start: 2022-06-09 | End: 2022-07-06 | Stop reason: SDUPTHER

## 2022-06-09 RX ORDER — DULOXETIN HYDROCHLORIDE 60 MG/1
60 CAPSULE, DELAYED RELEASE ORAL 2 TIMES DAILY
Qty: 180 CAPSULE | Refills: 1 | Status: SHIPPED | OUTPATIENT
Start: 2022-06-09 | End: 2022-11-21

## 2022-06-09 RX ORDER — DAPAGLIFLOZIN 5 MG/1
5 TABLET, FILM COATED ORAL DAILY
Qty: 90 TABLET | Refills: 1 | Status: SHIPPED | OUTPATIENT
Start: 2022-06-09 | End: 2023-01-30

## 2022-06-09 RX ORDER — MONTELUKAST SODIUM 10 MG/1
10 TABLET ORAL NIGHTLY
Qty: 90 TABLET | Refills: 1 | Status: SHIPPED | OUTPATIENT
Start: 2022-06-09 | End: 2022-11-21

## 2022-06-09 RX ORDER — GUAIFENESIN AND DEXTROMETHORPHAN HYDROBROMIDE 600; 30 MG/1; MG/1
1 TABLET, EXTENDED RELEASE ORAL 2 TIMES DAILY PRN
Qty: 60 TABLET | Refills: 3 | Status: SHIPPED | OUTPATIENT
Start: 2022-06-09 | End: 2022-09-27

## 2022-06-09 RX ORDER — IBUPROFEN 200 MG
950 CAPSULE ORAL DAILY
Qty: 90 TABLET | Refills: 3 | Status: SHIPPED | OUTPATIENT
Start: 2022-06-09

## 2022-06-09 RX ORDER — MONTELUKAST SODIUM 10 MG/1
10 TABLET ORAL NIGHTLY
COMMUNITY
End: 2022-06-09 | Stop reason: SDUPTHER

## 2022-06-09 NOTE — TELEPHONE ENCOUNTER
"----- Message from Judy Garg sent at 6/9/2022  8:59 AM EDT -----  Regarding: FW: APPT  Can see White or nurse practitioner, otherwise next available  ----- Message -----  From: Raj Warner RegSched Rep  Sent: 6/9/2022   8:49 AM EDT  To: Judy Garg  Subject: APPT                                             [facility]WIG[facility] REF PT TO DR LEWIS FOR \"Benign prostatic hyperplasia with urinary hesitancy, RECORDS SCANNED IN Epic, PLEASE ADVISE FOR AN APPT.      "

## 2022-06-09 NOTE — TELEPHONE ENCOUNTER
Bumex clarified by this nurse and Sima Rosado per Dr zamora during med set up and consult with wife . Order is to take 2 mg bid and call cardiology if weight is greater then 2 pounds in one day or 5 pounds . Rayna at DR Lantigua office also said that pt is to stop the diltiazem

## 2022-06-09 NOTE — TELEPHONE ENCOUNTER
CALLED AND SPOKE W/HENRIK AT DR.HOLLY PUTNAM OFFICE AND THEY WERE OK FOR PT REFERRAL TO BE SENT TO  OFFICE/SENT REFERRAL TO  OFFICE TO SCHEDULE APPT

## 2022-06-09 NOTE — TELEPHONE ENCOUNTER
Gabapentin last filled 12- #90 and per hospital # 14 6--9-2022, tox 7-, last ov 6-2-22 next OV 6-30-22

## 2022-06-09 NOTE — OUTREACH NOTE
AMBULATORY CASE MANAGEMENT NOTE    Name and Relationship of Patient/Support Person: INGA URIARTE - Emergency Contact    Referral place by PCP for chronic care management.  Gómez needs consist of medication management to prevent hospitalization.  Gómez has had 2 recent hospitalizations in the past month.      Consulted with PCP and nurse to review care, medication reconciliation and coordination with other providers.      Updated pharmacy, nephrologist and [facility] with medication list.    He is currently using remote Telehealth to monitor weight, blood pressure and oxygen saturations.     Education given regarding the importance of medication adherence.  Pre-planned 2 weeks of medication and will follow up in 2 weeks with additional med refills.      Education Documentation  Provider Follow-Up, taught by Traa Rosado RN at 6/9/2022  3:56 PM.  Learner: Significant Other  Readiness: Acceptance  Method: Explanation  Response: Verbalizes Understanding    Medication Management, taught by Tara Rosado RN at 6/9/2022  3:56 PM.  Learner: Significant Other  Readiness: Acceptance  Method: Explanation  Response: Verbalizes Understanding    Signs/Symptoms, taught by Tara Rosado RN at 6/9/2022  3:56 PM.  Learner: Significant Other  Readiness: Acceptance  Method: Explanation  Response: Verbalizes Understanding        Adult Patient Profile  Questions/Answers    Flowsheet Row Most Recent Value   Symptoms/Conditions Managed at Home cardiovascular   Cardiovascular Symptoms/Conditions heart failure   Cardiovascular Management Strategies fluid modification, medication therapy, weight management   Cardiovascular Self-Management Outcome 3 (uncertain)   Importance of Change 5   Confidence to Make Change 5   Readiness to Change 5   Missed Doses of Prescribed Medications During Past Week yes   Taken Prescribed Medications at Different Time or Schedule During Past Week yes   Taken More or Less Medication Than Prescribed yes         ALISSON GOMEZ  Ambulatory Case Management    6/9/2022, 15:57 EDT

## 2022-06-09 NOTE — TELEPHONE ENCOUNTER
PA request sent to plan 06/09/22    Drug -   Budesonide 0.5MG/2ML suspension  Form -   [place] Medicaid Electronic PA Form    Key: [id number] - PA Case ID: [id number] - Rx #: [id number]

## 2022-06-10 RX ORDER — BUMETANIDE 2 MG/1
TABLET ORAL
Qty: 1 TABLET | Refills: 0 | Status: SHIPPED | OUTPATIENT
Start: 2022-06-10 | End: 2022-07-06 | Stop reason: SDUPTHER

## 2022-06-13 ENCOUNTER — OFFICE VISIT (OUTPATIENT)
Dept: CARDIOLOGY | Facility: CLINIC | Age: 57
End: 2022-06-13

## 2022-06-13 ENCOUNTER — PATIENT OUTREACH (OUTPATIENT)
Dept: CASE MANAGEMENT | Facility: OTHER | Age: 57
End: 2022-06-13

## 2022-06-13 VITALS
SYSTOLIC BLOOD PRESSURE: 113 MMHG | DIASTOLIC BLOOD PRESSURE: 55 MMHG | HEIGHT: 69 IN | BODY MASS INDEX: 40.17 KG/M2 | HEART RATE: 79 BPM

## 2022-06-13 DIAGNOSIS — I10 ESSENTIAL (PRIMARY) HYPERTENSION: ICD-10-CM

## 2022-06-13 DIAGNOSIS — I50.32 CHRONIC DIASTOLIC (CONGESTIVE) HEART FAILURE: Primary | ICD-10-CM

## 2022-06-13 DIAGNOSIS — I48.0 PAROXYSMAL ATRIAL FIBRILLATION: ICD-10-CM

## 2022-06-13 PROCEDURE — 99213 OFFICE O/P EST LOW 20 MIN: CPT | Performed by: INTERNAL MEDICINE

## 2022-06-13 NOTE — OUTREACH NOTE
AMBULATORY CASE MANAGEMENT NOTE    Name and Relationship of Patient/Support Person: Kami Wallis - Emergency Contact    Spoke with both Elizabeth and Gómez.  Appointment today with cardiology went well.  No changes in medications.  Follow up in 1 month.  No labs today.  Has follow up with Dr. Riley end of month and urology this week.    Medication planning with medications was helpful they report.    Inquired about his port, Gómez says that it gets flushed at [facility] but was told needs replacing.  This has never been completed due to numerous hospitalizations.  Records show that last access was 2020 and outpatient infusion has not flushed since that time.  Last admissions, he has had peripheral IV access and not using port.  The port was placed in 11/2005 Nancy for Venous Insuffiency and IV antibiotics.  Should he need a consult to have removed ?      He is scheduled to come in on the 30th - do you want labs in advance?       PT is working with Gómez for mobility, deconditioning.       Education Documentation  No documentation found.        ALISSON GOMEZ  Ambulatory Case Management    6/13/2022, 14:02 EDT

## 2022-06-15 ENCOUNTER — OFFICE VISIT (OUTPATIENT)
Dept: UROLOGY | Facility: CLINIC | Age: 57
End: 2022-06-15

## 2022-06-15 VITALS
TEMPERATURE: 98.4 F | DIASTOLIC BLOOD PRESSURE: 58 MMHG | BODY MASS INDEX: 40.29 KG/M2 | HEART RATE: 84 BPM | HEIGHT: 69 IN | WEIGHT: 272 LBS | SYSTOLIC BLOOD PRESSURE: 135 MMHG

## 2022-06-15 DIAGNOSIS — Z74.09 IMPAIRED MOBILITY AND ENDURANCE: ICD-10-CM

## 2022-06-15 DIAGNOSIS — I50.32 CHRONIC DIASTOLIC (CONGESTIVE) HEART FAILURE: ICD-10-CM

## 2022-06-15 DIAGNOSIS — R33.8 URINARY RETENTION DUE TO BENIGN PROSTATIC HYPERPLASIA: Primary | ICD-10-CM

## 2022-06-15 DIAGNOSIS — J44.9 CHRONIC OBSTRUCTIVE PULMONARY DISEASE, UNSPECIFIED COPD TYPE: ICD-10-CM

## 2022-06-15 DIAGNOSIS — E10.22 TYPE 1 DIABETES MELLITUS WITH STAGE 3 CHRONIC KIDNEY DISEASE, UNSPECIFIED WHETHER STAGE 3A OR 3B CKD: ICD-10-CM

## 2022-06-15 DIAGNOSIS — Z97.8 FOLEY CATHETER IN PLACE: ICD-10-CM

## 2022-06-15 DIAGNOSIS — Z86.79 HISTORY OF ATRIAL FIBRILLATION: ICD-10-CM

## 2022-06-15 DIAGNOSIS — R31.29 MICROSCOPIC HEMATURIA: ICD-10-CM

## 2022-06-15 DIAGNOSIS — N40.1 URINARY RETENTION DUE TO BENIGN PROSTATIC HYPERPLASIA: Primary | ICD-10-CM

## 2022-06-15 DIAGNOSIS — N18.30 TYPE 1 DIABETES MELLITUS WITH STAGE 3 CHRONIC KIDNEY DISEASE, UNSPECIFIED WHETHER STAGE 3A OR 3B CKD: ICD-10-CM

## 2022-06-15 PROBLEM — N40.0 BENIGN PROSTATIC HYPERPLASIA: Status: ACTIVE | Noted: 2022-06-15

## 2022-06-15 PROBLEM — R05.9 COUGH: Status: RESOLVED | Noted: 2021-10-19 | Resolved: 2022-06-15

## 2022-06-15 LAB
BILIRUB BLD-MCNC: NEGATIVE MG/DL
CLARITY, POC: CLEAR
COLOR UR: YELLOW
GLUCOSE UR STRIP-MCNC: ABNORMAL MG/DL
KETONES UR QL: NEGATIVE
LEUKOCYTE EST, POC: NEGATIVE
NITRITE UR-MCNC: NEGATIVE MG/ML
PH UR: 7 [PH] (ref 5–8)
PROT UR STRIP-MCNC: ABNORMAL MG/DL
RBC # UR STRIP: ABNORMAL /UL
SP GR UR: 1.02 (ref 1–1.03)
UROBILINOGEN UR QL: NORMAL

## 2022-06-15 PROCEDURE — 99215 OFFICE O/P EST HI 40 MIN: CPT | Performed by: NURSE PRACTITIONER

## 2022-06-15 NOTE — PROGRESS NOTES
CARDIOLOGY INITIAL CONSULT       Chief Complaint  Atrial Fibrillation and Congestive Heart Failure (Establish care)    Subjective            Preston Wallis presents to [facility] CARDIOLOGY  History of Present Illness    This is a 56-year-old male with chronic diastolic heart failure, paroxysmal atrial fibrillation, chronic kidney disease, COPD, hypertension, sleep apnea, diabetes mellitus.  He is here to establish cardiac care.  He came in a wheelchair accompanied by spouse.  He had recurrent admissions to hospitals recently for heart failure exacerbation and COPD exacerbation.  He was most recently discharged from [facility] on 6/5/2022 after being treated for COPD and CHF exacerbation.  A Doyle catheter was placed on discharge since patient had inability in passing urine.  He is currently on Bumex twice daily.  For the past 1 week he is feeling somewhat better.  However he is unable to walk around due to severe weakness and fatigue along with shortness of breath.  He has no chest pain or tightness.  He denies having any palpitations.      Past History:    Medical History:  Past Medical History:   Diagnosis Date   • Age-related cognitive decline    • Allergic contact dermatitis    • Allergies    • Anemia    • Bronchiectasis with acute lower respiratory infection (HCC)    • Chest pain    • Chronic bronchitis (HCC)    • Chronic diastolic (congestive) heart failure (HCC)    • Chronic kidney disease    • Chronic respiratory failure with hypoxia (HCC)    • COPD (chronic obstructive pulmonary disease) (HCC)    • Cough    • Dependence on supplemental oxygen    • Eczema    • Erectile dysfunction     due to organic reasons   • Essential (primary) hypertension    • Fracture     closed fracture of other tarsal and metatarsal bones   • GERD without esophagitis    • High risk medication use    • Hypercholesteremia    • Hypomagnesemia    • Insomnia    • Low back pain    • Major depressive disorder    •  Major depressive disorder    • Morbid (severe) obesity due to excess calories (HCC)    • MRSA pneumonia (HCC)    • Muscle weakness    • 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 without evidence of necrosis (HCC)    • Obstructive sleep apnea (adult) (pediatric)    • Other forms of dyspnea    • Other long term (current) drug therapy    • Other specified noninfective gastroenteritis and colitis    • Other spondylosis, lumbar region    • Pain in both knees    • Paroxysmal atrial fibrillation (HCC)    • Peripheral neuropathy     attributed to type 2 diabetes   • Pneumonia, unspecified organism    • Polyneuropathy    • Rash and other nonspecific skin eruption    • Syncope and collapse    • Tachycardia    • Type 1 diabetes mellitus with diabetic chronic kidney disease (HCC)    • Type 2 diabetes mellitus (HCC)    • Unspecified fall, initial encounter        Surgical History: has a past surgical history that includes Cholecystectomy; tonsillectomy and adenoidectomy; Knee surgery (Left); and Other surgical history (Left).     Family History: family history includes Asthma in his father; Cancer in his sister; Coronary artery disease in his mother; Diabetes type II in his mother; Hypertension in his mother.  Family history reviewed and strongly positive for premature coronary artery disease.  Patient's mom had coronary bypass grafting at the age of 60 years.  Patient's brother had bypass grafting in his 50s.    Social History: reports that he quit smoking about 29 years ago. His smoking use included cigarettes. He has a 6.00 pack-year smoking history. He has never used smokeless tobacco. He reports previous alcohol use. He reports that he does not use drugs.    Allergies: Benadryl [diphenhydramine] and Proventil [albuterol]    Current Outpatient Medications on File Prior to Visit   Medication Sig   • albuterol sulfate  (90 Base) MCG/ACT inhaler Inhale 2 puffs 4 (Four) Times a Day  As Needed for Wheezing.   • apixaban (Eliquis) 5 MG tablet tablet Take 1 tablet by mouth Every 12 (Twelve) Hours.   • aspirin 81 MG EC tablet Take 81 mg by mouth Daily.   • atorvastatin (LIPITOR) 20 MG tablet Take 1 tablet by mouth Every Night.   • Blood Glucose Monitoring Suppl w/Device kit 1 kit Take As Directed. Dx e11.9   • budesonide (PULMICORT) 0.5 MG/2ML nebulizer solution Take 2 mL by nebulization 2 (Two) Times a Day.   • bumetanide (BUMEX) 2 MG tablet Take 1 tablet twice daily .  Call cardiology  if weight is great then 2 pounds in one day or 5 pounds in a week   • calcium citrate (CALCITRATE) 950 (200 Ca) MG tablet Take 1 tablet by mouth Daily.   • Cholecalciferol (Vitamin D3) 50 MCG (2000 UT) tablet Take 1 tablet by mouth Daily.   • dapagliflozin (Farxiga) 5 MG tablet tablet Take 1 tablet by mouth Daily.   • docusate sodium (COLACE) 100 MG capsule TAKE 1 CAPSULE BY MOUTH TWICE DAILY   • DULoxetine (CYMBALTA) 60 MG capsule Take 1 capsule by mouth 2 (Two) Times a Day.   • exenatide er (Bydureon BCise) 2 MG/0.85ML auto-injector injection Inject 0.85 mL under the skin into the appropriate area as directed 1 (One) Time Per Week.   • famotidine (PEPCID) 40 MG tablet Take 1 tablet by mouth Daily.   • ferrous gluconate (FERGON) 324 MG tablet Take 1 tablet by mouth Daily With Breakfast.   • finasteride (PROSCAR) 5 MG tablet Take 1 tablet by mouth Daily.   • folic acid (FOLVITE) 1 MG tablet Take 1 tablet by mouth Daily.   • gabapentin (NEURONTIN) 300 MG capsule Take 1 capsule by mouth every night at bedtime.   • glucose blood test strip 1 each by Other route 4 (Four) Times a Day With Meals & at Bedtime. Use as instructed dx e 11.9   • guaifenesin-dextromethorphan (MUCINEX DM)  MG tablet sustained-release 12 hour tablet Take 1 tablet by mouth 2 (Two) Times a Day As Needed (congestion).   • hydrALAZINE (APRESOLINE) 50 MG tablet Take 1 tablet by mouth 2 (Two) Times a Day.   • Insulin Glargine (LANTUS SOLOSTAR)  100 UNIT/ML injection pen Inject 40 Units under the skin into the appropriate area as directed Daily.   • Insulin Lispro (ADMELOG SOLOSTAR SC) Inject  under the skin into the appropriate area as directed. Per SSI, if BS <150 = 0 units, 151-180= 1 unit, 181-210= 2units, 211-240= 3units, 241-270= 4units, 271-300= 5units, 301-330=6 untis, 331-390= 8units, <390 give 9units   • Insulin Pen Needle (B-D UF III MINI PEN NEEDLES) 31G X 5 MM misc Inject 1 each under the skin into the appropriate area as directed See Admin Instructions. with insulin   • ipratropium-albuterol (DUO-NEB) 0.5-2.5 mg/3 ml nebulizer Take 3 mL by nebulization Every 4 (Four) Hours As Needed for Wheezing or Shortness of Air. Cancel plain albuterol nebs   • metoprolol tartrate (LOPRESSOR) 100 MG tablet Take 1 tablet by mouth 2 (Two) Times a Day.   • montelukast (SINGULAIR) 10 MG tablet Take 1 tablet by mouth Every Night.   • NIFEdipine XL (PROCARDIA XL) 30 MG 24 hr tablet Take 30 mg by mouth Every Morning.   • O2 (OXYGEN) 2 Liter O2 - CONTINUOUS (route: Oxygen)   • salmeterol (Serevent Diskus) 50 MCG/DOSE diskus inhaler Inhale 1 puff 2 (Two) Times a Day.   • tamsulosin (FLOMAX) 0.4 MG capsule 24 hr capsule Take 2 capsules by mouth Every Evening.   • traZODone (DESYREL) 100 MG tablet Take 1 tablet by mouth Every Night.   • TRUEplus Lancets 28G misc USE AS DIRECTED     No current facility-administered medications on file prior to visit.          Review of Systems   Constitutional: Positive for fatigue. Negative for unexpected weight gain and unexpected weight loss.   Eyes: Negative for double vision.   Respiratory: Positive for shortness of breath and wheezing. Negative for cough.    Cardiovascular: Positive for leg swelling. Negative for chest pain.   Gastrointestinal: Negative for abdominal pain, nausea and vomiting.   Endocrine: Negative for cold intolerance, heat intolerance, polydipsia and polyuria.   Musculoskeletal: Negative for arthralgias and  "back pain.   Skin: Negative for color change.   Neurological: Negative for dizziness, syncope, weakness and headache.   Hematological: Does not bruise/bleed easily.        Objective     /55   Pulse 79   Ht 175.3 cm (69\")   BMI 40.17 kg/m²       Physical Exam  Constitutional:       General: He is awake. He is not in acute distress.     Appearance: Normal appearance.   Eyes:      Extraocular Movements: Extraocular movements intact.      Pupils: Pupils are equal, round, and reactive to light.   Neck:      Thyroid: No thyromegaly.      Vascular: No carotid bruit or JVD.   Cardiovascular:      Rate and Rhythm: Normal rate and regular rhythm.      Chest Wall: PMI is not displaced.      Heart sounds: Normal heart sounds, S1 normal and S2 normal. No murmur heard.    No friction rub. No gallop. No S3 or S4 sounds.   Pulmonary:      Effort: Pulmonary effort is normal. No respiratory distress.      Breath sounds: Wheezing present. No rhonchi or rales.   Abdominal:      General: Bowel sounds are normal.      Palpations: Abdomen is soft.      Tenderness: There is no abdominal tenderness.   Musculoskeletal:      Cervical back: Neck supple.      Right lower leg: Edema present.      Left lower leg: Edema present.   Skin:     Nails: There is no clubbing.   Neurological:      General: No focal deficit present.      Mental Status: He is alert and oriented to person, place, and time.           Result Review :     The following data was reviewed by: Lino Ware MD on 06/13/2022:    CMP    CMP 4/9/22 4/10/22 4/11/22   Glucose 310 (A) 290 (A) 275 (A)   BUN 57 (A) 61 (A) 62 (A)   Creatinine 1.88 (A) 2.05 (A) 1.89 (A)   Sodium 130 (A) 132 (A) 135 (A)   Potassium 4.7 4.3 4.1   Chloride 93 (A) 94 (A) 96 (A)   Calcium 9.1 9.2 9.1   Albumin 3.50 3.40 (A) 3.50   Total Bilirubin 0.2 0.2 0.2   Alkaline Phosphatase 154 (A) 146 (A) 141 (A)   AST (SGOT) 18 18 18   ALT (SGPT) 17 19 20   (A) Abnormal value            CBC    CBC 4/9/22 " 4/10/22 4/11/22   WBC 13.33 (A) 14.13 (A) 13.86 (A)   RBC 3.78 (A) 3.97 (A) 4.06 (A)   Hemoglobin 10.5 (A) 10.7 (A) 11.2 (A)   Hematocrit 33.1 (A) 33.7 (A) 35.0 (A)   MCV 87.6 84.9 86.2   MCH 27.8 27.0 27.6   MCHC 31.7 31.8 32.0   RDW 12.7 12.9 12.9   Platelets 405 416 406   (A) Abnormal value              Lipid Panel    Lipid Panel 7/27/21 3/17/22   Total Cholesterol 183 131   Triglycerides 173 (A) 69   HDL Cholesterol 38 (A) 65 (A)   VLDL Cholesterol 31 14   LDL Cholesterol  114 (A) 52   LDL/HDL Ratio 2.91 0.80   (A) Abnormal value               Data reviewed: Cardiology studies    Results for orders placed during the hospital encounter of 04/05/22    Adult Transthoracic Echo Limited W/ Cont if Necessary Per Protocol    Interpretation Summary  · The aortic root measures 3.1 cm.  · Left ventricular ejection fraction appears to be 56 - 60%.  · Left ventricular diastolic function is consistent with (grade I) impaired relaxation.    There were no apparent intracardiac masses, vegetations or thrombi.          EKG done in April showed sinus rhythm, prolonged NC interval, borderline repolarization normalities multiple leads             Assessment and Plan        Diagnoses and all orders for this visit:    1. Chronic diastolic (congestive) heart failure (HCC) (Primary)  Assessment & Plan:  He also has chronic kidney disease and COPD.  He had multiple recent hospital admissions for exacerbation.  Today he has pedal edema.  No crackles on lung auscultation.  Most reasonable echocardiogram showed normal LV systolic function.  I talked extensively regarding medication compliance.  Also discussed about fluid restriction up to 1800 mL/day.  He is already following a low-salt diet.  We will continue Bumex at the current dose.      2. Paroxysmal atrial fibrillation (HCC)  Assessment & Plan:  He is in sinus rhythm on physical examination.  We will continue Eliquis for anticoagulation because of high [id number]-[id number] 2 score.  Continue  metoprolol for rate and rhythm management.      3. Essential (primary) hypertension  Assessment & Plan:  Blood pressure very well controlled.  He is on multiple antihypertensive medications including nifedipine, metoprolol, hydralazine.  Medication will be continued without changes.  Encouraged follow-up with nephrology as well and he has appointment in August.        I spent 24 minutes caring for Preston on this date of service. This time includes time spent by me in the following activities:reviewing tests, obtaining and/or reviewing a separately obtained history, performing a medically appropriate examination and/or evaluation , ordering medications, tests, or procedures, and documenting information in the medical record    Follow Up     We will follow closely in 1 month.  He is advised to call office for any rapid weight gain or worsening shortness of breath in the meantime.  Return in about 1 month (around 7/13/2022) for Recheck in [place] office .    Patient was given instructions and counseling regarding his condition or for health maintenance advice. Please see specific information pulled into the AVS if appropriate.

## 2022-06-15 NOTE — ASSESSMENT & PLAN NOTE
He also has chronic kidney disease and COPD.  He had multiple recent hospital admissions for exacerbation.  Today he has pedal edema.  No crackles on lung auscultation.  Most reasonable echocardiogram showed normal LV systolic function.  I talked extensively regarding medication compliance.  Also discussed about fluid restriction up to 1800 mL/day.  He is already following a low-salt diet.  We will continue Bumex at the current dose.

## 2022-06-15 NOTE — ASSESSMENT & PLAN NOTE
He is in sinus rhythm on physical examination.  We will continue Eliquis for anticoagulation because of high [id number] 2 score.  Continue metoprolol for rate and rhythm management.

## 2022-06-15 NOTE — PROGRESS NOTES
"Chief Complaint  Urinary retention,BPH     Subjective          Preston Wallis 56 y.o. male presents to [facility] UROLOGY  Referred by  PCP Kimmy Riley MD for evaluation of Urinary hesitancy and enlarged prostate.  Patient arrived in w/c with indwelling F/C. Spouse accompanied. Last psa level 0.725 on 3-17-22. Patient with extensive  medical history to include  Diabetes, Atrial Fibrillation,  CHF, COPD, Recurrent pneumonia and CKD. Patient is followed by Nephrology Associated Dr Martínez for ckd stage 3.  According to patient and spouse, patient having difficulty with voiding for at least a year and began tamsulosin. Finesteride added more recently . Patient hospitalized in April for CHF and pneumonia and a lopez catheter placed during admission  Due to difficulty voiding. Described as almost a liter of urine emptied. Removed prior to discharge. No previous history of f/c placement according to patient. \"In and out of hospital several times past few months. Latest admission to [facility] approximately 3 weeks ago under 48 hour observation due to his CHF. Discharged home with another lopez. CT ABD/Pelvis 5/26/22 without Upper tract abnormalities. No stones or hydronephrosis. Lopez Catheter decompresses bladder. Moderate colonic stool. Denies personal or family history of prostate cancer.     Review of Systems   Constitutional: Negative for chills and fever.   Cardiovascular: Positive for leg swelling.   Genitourinary: Positive for difficulty urinating. Negative for dysuria, frequency, hematuria, scrotal swelling and testicular pain.   Musculoskeletal: Positive for gait problem.      Objective   Vital Signs:   /58   Pulse 84   Temp 98.4 °F (36.9 °C)   Ht 175.3 cm (69\")   Wt 123 kg (272 lb)   BMI 40.17 kg/m²      Past Surgical History:   Procedure Laterality Date   • CHOLECYSTECTOMY     • KNEE SURGERY Left    • OTHER SURGICAL HISTORY Left     venous port   • TONSILLECTOMY AND " ADENOIDECTOMY          Physical Exam  Vitals and nursing note reviewed.   Constitutional:       General: He is not in acute distress.     Appearance: Normal appearance. He is well-developed. He is not ill-appearing.      Comments: Transfer and stands with max assist. W/C bmi 40.17   Cardiovascular:      Rate and Rhythm: Normal rate.      Heart sounds: No murmur heard.  Pulmonary:      Effort: Pulmonary effort is normal.      Comments: Lungs diminished throughout. Oxygen per N/C  Abdominal:      General: Bowel sounds are normal. There is no distension.      Palpations: Abdomen is soft. There is no mass.      Tenderness: There is no abdominal tenderness. There is no guarding or rebound.      Hernia: There is no hernia in the left inguinal area or right inguinal area.      Comments: Small umbilical hernia   Genitourinary:     Penis: Circumcised. No discharge or lesions. Penile erythema: meatus mild erythema f/c present.       Testes: Normal.      Epididymis:      Right: Normal.      Left: Normal.      Comments: Prostate approximately 25g normal consistance no nodules. Hard stool in rectal vault. Mild erythema meatus   Musculoskeletal:      Right lower leg: Edema present.      Left lower leg: Edema present.   Lymphadenopathy:      Lower Body: No right inguinal adenopathy. No left inguinal adenopathy.   Skin:     General: Skin is warm and dry.   Neurological:      General: No focal deficit present.      Mental Status: He is alert and oriented to person, place, and time.   Psychiatric:         Mood and Affect: Mood normal.         Behavior: Behavior is cooperative.         Thought Content: Thought content normal.         Judgment: Judgment normal.        Result Review :   The following data was reviewed by: CON Mon on 06/15/2022:  CMP    CMP 4/9/22 4/10/22 4/11/22   Glucose 310 (A) 290 (A) 275 (A)   BUN 57 (A) 61 (A) 62 (A)   Creatinine 1.88 (A) 2.05 (A) 1.89 (A)   Sodium 130 (A) 132 (A) 135 (A)    Potassium 4.7 4.3 4.1   Chloride 93 (A) 94 (A) 96 (A)   Calcium 9.1 9.2 9.1   Albumin 3.50 3.40 (A) 3.50   Total Bilirubin 0.2 0.2 0.2   Alkaline Phosphatase 154 (A) 146 (A) 141 (A)   AST (SGOT) 18 18 18   ALT (SGPT) 17 19 20   (A) Abnormal value            CBC    CBC 4/9/22 4/10/22 4/11/22   WBC 13.33 (A) 14.13 (A) 13.86 (A)   RBC 3.78 (A) 3.97 (A) 4.06 (A)   Hemoglobin 10.5 (A) 10.7 (A) 11.2 (A)   Hematocrit 33.1 (A) 33.7 (A) 35.0 (A)   MCV 87.6 84.9 86.2   MCH 27.8 27.0 27.6   MCHC 31.7 31.8 32.0   RDW 12.7 12.9 12.9   Platelets 405 416 406   (A) Abnormal value                PSA    PSA 3/17/22   PSA 0.725            POC Urinalysis Dipstick (06/15/2022 10:23)         SCANNED - IMAGING (05/26/2022)  SCANNED - IMAGING (05/03/2022)       Assessment and Plan    Diagnoses and all orders for this visit:    1. Urinary retention due to benign prostatic hyperplasia (Primary)  -     POC Urinalysis Dipstick    2. Lopez catheter in place    3. Microscopic hematuria    4. Chronic diastolic (congestive) heart failure (HCC)    5. Chronic obstructive pulmonary disease, unspecified COPD type (HCC)- varicoid bronchiectasis per imaging    6. Type 1 diabetes mellitus with stage 3 chronic kidney disease, unspecified whether stage 3a or 3b CKD (HCC)    7. History of atrial fibrillation    8. Impaired mobility and endurance    schedule cystoscopy in office to evaluate prostate and urinary retention. This lopez catheter present approximately 3 weeks.  clear yellow urine bsd. Will  evaluate if able to remove lopez during procedure. Evaluate microscopic hematuria and SAMUEL. Discussed also will evaluate if candidate for urolift. Prostate mild enlargement on LENY.  Patient is on anticoagulant therapy.   Continue current orders for tamsulosin 2 q hs and finesteride. Discussed appropriate perineal hygiene to prevent candidiasis. CT recent no stones or hydronephrosis.     Instructed to wear appropriate footwear/shoes and bring walker next  visit. Educated on difference between fluid retention( generalized edema) and urinary retention.     I spent 45 minutes caring for Preston on this date of service. This time includes time spent by me in the following activities:preparing for the visit, reviewing tests, obtaining and/or reviewing a separately obtained history, performing a medically appropriate examination and/or evaluation , counseling and educating the patient/family/caregiver, ordering medications, tests, or procedures, referring and communicating with other health care professionals , documenting information in the medical record and independently interpreting results and communicating that information with the patient/family/caregiver  Follow Up   Return in about 1 week (around 6/22/2022) for cystoscopy and remove f/c..  Patient was given instructions and counseling regarding his condition or for health maintenance advice. Please see specific information pulled into the AVS if appropriate.     Sulema Merritt, APRN

## 2022-06-15 NOTE — ASSESSMENT & PLAN NOTE
Blood pressure very well controlled.  He is on multiple antihypertensive medications including nifedipine, metoprolol, hydralazine.  Medication will be continued without changes.  Encouraged follow-up with nephrology as well and he has appointment in August.

## 2022-06-21 ENCOUNTER — PATIENT OUTREACH (OUTPATIENT)
Dept: CASE MANAGEMENT | Facility: OTHER | Age: 57
End: 2022-06-21

## 2022-06-21 DIAGNOSIS — Z45.2 ENCOUNTER FOR CARE RELATED TO VASCULAR ACCESS PORT: Primary | ICD-10-CM

## 2022-06-21 DIAGNOSIS — I50.32 CHRONIC DIASTOLIC (CONGESTIVE) HEART FAILURE: Primary | ICD-10-CM

## 2022-06-21 DIAGNOSIS — Z79.4 UNCONTROLLED TYPE 2 DIABETES MELLITUS WITH HYPERGLYCEMIA, WITH LONG-TERM CURRENT USE OF INSULIN: ICD-10-CM

## 2022-06-21 DIAGNOSIS — E55.9 VITAMIN D DEFICIENCY: ICD-10-CM

## 2022-06-21 DIAGNOSIS — I50.32 CHRONIC DIASTOLIC (CONGESTIVE) HEART FAILURE: ICD-10-CM

## 2022-06-21 DIAGNOSIS — D63.1 ANEMIA DUE TO STAGE 3B CHRONIC KIDNEY DISEASE: Primary | ICD-10-CM

## 2022-06-21 DIAGNOSIS — N18.32 ANEMIA DUE TO STAGE 3B CHRONIC KIDNEY DISEASE: Primary | ICD-10-CM

## 2022-06-21 DIAGNOSIS — E11.65 UNCONTROLLED TYPE 2 DIABETES MELLITUS WITH HYPERGLYCEMIA, WITH LONG-TERM CURRENT USE OF INSULIN: ICD-10-CM

## 2022-06-21 DIAGNOSIS — I10 ESSENTIAL (PRIMARY) HYPERTENSION: ICD-10-CM

## 2022-06-21 PROBLEM — E66.01 CLASS 3 SEVERE OBESITY WITH SERIOUS COMORBIDITY IN ADULT: Status: ACTIVE | Noted: 2022-06-21

## 2022-06-21 PROBLEM — D50.8 IRON DEFICIENCY ANEMIA SECONDARY TO INADEQUATE DIETARY IRON INTAKE: Status: ACTIVE | Noted: 2022-06-21

## 2022-06-21 PROBLEM — E66.813 CLASS 3 SEVERE OBESITY WITH SERIOUS COMORBIDITY IN ADULT: Status: ACTIVE | Noted: 2022-06-21

## 2022-06-21 PROBLEM — N18.9 VITAMIN D DEFICIENCY DUE TO CHRONIC KIDNEY DISEASE: Status: ACTIVE | Noted: 2022-06-21

## 2022-06-21 PROBLEM — R74.8 ELEVATED ALKALINE PHOSPHATASE LEVEL: Status: ACTIVE | Noted: 2022-06-21

## 2022-06-21 PROBLEM — R60.0 LOWER EXTREMITY EDEMA: Chronic | Status: ACTIVE | Noted: 2022-06-21

## 2022-06-21 NOTE — PROGRESS NOTES
Yes on referral to Dr. Engel to reeval need to keep port in versus removed.  It looks like labs are already ordered for him in the chart.  Dr. Riley

## 2022-06-21 NOTE — OUTREACH NOTE
AMBULATORY CASE MANAGEMENT NOTE    Name and Relationship of Patient/Support Person: Kami Wallis G - Emergency Contact    Called Kami to check in.  Tomorrow Gómez is scheduled for procedure at urology office, will follow.  Asked if Kami would drop off medications on the way to [facility] and can  on way back so can fill up planner.      Pended labs for Dr. Riley to sign for home health to draw next week.   Called Fanny Farmer to inform.     Education Documentation  No documentation found.      ALISSON R  Ambulatory Case Management    6/21/2022, 10:50 EDT

## 2022-06-22 ENCOUNTER — LAB (OUTPATIENT)
Dept: LAB | Facility: HOSPITAL | Age: 57
End: 2022-06-22

## 2022-06-22 ENCOUNTER — TELEPHONE (OUTPATIENT)
Dept: FAMILY MEDICINE CLINIC | Age: 57
End: 2022-06-22

## 2022-06-22 ENCOUNTER — DOCUMENTATION (OUTPATIENT)
Dept: FAMILY MEDICINE CLINIC | Age: 57
End: 2022-06-22

## 2022-06-22 ENCOUNTER — PATIENT OUTREACH (OUTPATIENT)
Dept: CASE MANAGEMENT | Facility: OTHER | Age: 57
End: 2022-06-22

## 2022-06-22 ENCOUNTER — PROCEDURE VISIT (OUTPATIENT)
Dept: UROLOGY | Facility: CLINIC | Age: 57
End: 2022-06-22

## 2022-06-22 DIAGNOSIS — Z79.4 UNCONTROLLED TYPE 2 DIABETES MELLITUS WITH HYPERGLYCEMIA, WITH LONG-TERM CURRENT USE OF INSULIN: Primary | ICD-10-CM

## 2022-06-22 DIAGNOSIS — I50.32 CHRONIC DIASTOLIC (CONGESTIVE) HEART FAILURE: ICD-10-CM

## 2022-06-22 DIAGNOSIS — N13.8 BPH WITH URINARY OBSTRUCTION: ICD-10-CM

## 2022-06-22 DIAGNOSIS — I10 ESSENTIAL (PRIMARY) HYPERTENSION: ICD-10-CM

## 2022-06-22 DIAGNOSIS — N18.32 ANEMIA DUE TO STAGE 3B CHRONIC KIDNEY DISEASE: ICD-10-CM

## 2022-06-22 DIAGNOSIS — Z79.4 UNCONTROLLED TYPE 2 DIABETES MELLITUS WITH HYPERGLYCEMIA, WITH LONG-TERM CURRENT USE OF INSULIN: ICD-10-CM

## 2022-06-22 DIAGNOSIS — D50.9 IRON DEFICIENCY ANEMIA, UNSPECIFIED IRON DEFICIENCY ANEMIA TYPE: Primary | ICD-10-CM

## 2022-06-22 DIAGNOSIS — N40.1 BENIGN PROSTATIC HYPERPLASIA WITH URINARY RETENTION: Primary | ICD-10-CM

## 2022-06-22 DIAGNOSIS — N40.1 BPH WITH URINARY OBSTRUCTION: ICD-10-CM

## 2022-06-22 DIAGNOSIS — E11.65 UNCONTROLLED TYPE 2 DIABETES MELLITUS WITH HYPERGLYCEMIA, WITH LONG-TERM CURRENT USE OF INSULIN: Primary | ICD-10-CM

## 2022-06-22 DIAGNOSIS — D63.1 ANEMIA DUE TO STAGE 3B CHRONIC KIDNEY DISEASE: ICD-10-CM

## 2022-06-22 DIAGNOSIS — R33.9 URINARY RETENTION: Primary | ICD-10-CM

## 2022-06-22 DIAGNOSIS — E55.9 VITAMIN D DEFICIENCY: ICD-10-CM

## 2022-06-22 DIAGNOSIS — R33.8 BENIGN PROSTATIC HYPERPLASIA WITH URINARY RETENTION: Primary | ICD-10-CM

## 2022-06-22 DIAGNOSIS — E11.65 UNCONTROLLED TYPE 2 DIABETES MELLITUS WITH HYPERGLYCEMIA, WITH LONG-TERM CURRENT USE OF INSULIN: ICD-10-CM

## 2022-06-22 DIAGNOSIS — N31.2 FLACCID NEUROGENIC BLADDER: ICD-10-CM

## 2022-06-22 LAB
25(OH)D3 SERPL-MCNC: 29.4 NG/ML (ref 30–100)
ALBUMIN SERPL-MCNC: 3.8 G/DL (ref 3.5–5.2)
ALBUMIN/GLOB SERPL: 1 G/DL
ALP SERPL-CCNC: 201 U/L (ref 39–117)
ALT SERPL W P-5'-P-CCNC: 20 U/L (ref 1–41)
ANION GAP SERPL CALCULATED.3IONS-SCNC: 11.9 MMOL/L (ref 5–15)
AST SERPL-CCNC: 19 U/L (ref 1–40)
BASOPHILS # BLD AUTO: 0.09 10*3/MM3 (ref 0–0.2)
BASOPHILS NFR BLD AUTO: 0.8 % (ref 0–1.5)
BILIRUB SERPL-MCNC: <0.2 MG/DL (ref 0–1.2)
BUN SERPL-MCNC: 24 MG/DL (ref 6–20)
BUN/CREAT SERPL: 14.8 (ref 7–25)
CALCIUM SPEC-SCNC: 9.3 MG/DL (ref 8.6–10.5)
CHLORIDE SERPL-SCNC: 93 MMOL/L (ref 98–107)
CO2 SERPL-SCNC: 31.1 MMOL/L (ref 22–29)
CREAT SERPL-MCNC: 1.62 MG/DL (ref 0.76–1.27)
DEPRECATED RDW RBC AUTO: 49.4 FL (ref 37–54)
EGFRCR SERPLBLD CKD-EPI 2021: 49.2 ML/MIN/1.73
EOSINOPHIL # BLD AUTO: 0.44 10*3/MM3 (ref 0–0.4)
EOSINOPHIL NFR BLD AUTO: 4.1 % (ref 0.3–6.2)
ERYTHROCYTE [DISTWIDTH] IN BLOOD BY AUTOMATED COUNT: 14.8 % (ref 12.3–15.4)
GLOBULIN UR ELPH-MCNC: 3.7 GM/DL
GLUCOSE SERPL-MCNC: 326 MG/DL (ref 65–99)
HBA1C MFR BLD: 8.3 % (ref 4.8–5.6)
HCT VFR BLD AUTO: 33.2 % (ref 37.5–51)
HGB BLD-MCNC: 10.2 G/DL (ref 13–17.7)
IMM GRANULOCYTES # BLD AUTO: 0.03 10*3/MM3 (ref 0–0.05)
IMM GRANULOCYTES NFR BLD AUTO: 0.3 % (ref 0–0.5)
LYMPHOCYTES # BLD AUTO: 1.95 10*3/MM3 (ref 0.7–3.1)
LYMPHOCYTES NFR BLD AUTO: 18.3 % (ref 19.6–45.3)
MCH RBC QN AUTO: 27.5 PG (ref 26.6–33)
MCHC RBC AUTO-ENTMCNC: 30.7 G/DL (ref 31.5–35.7)
MCV RBC AUTO: 89.5 FL (ref 79–97)
MONOCYTES # BLD AUTO: 0.84 10*3/MM3 (ref 0.1–0.9)
MONOCYTES NFR BLD AUTO: 7.9 % (ref 5–12)
NEUTROPHILS NFR BLD AUTO: 68.6 % (ref 42.7–76)
NEUTROPHILS NFR BLD AUTO: 7.28 10*3/MM3 (ref 1.7–7)
NT-PROBNP SERPL-MCNC: 463 PG/ML (ref 0–900)
PLATELET # BLD AUTO: 379 10*3/MM3 (ref 140–450)
PMV BLD AUTO: 8.8 FL (ref 6–12)
POTASSIUM SERPL-SCNC: 4.2 MMOL/L (ref 3.5–5.2)
PROT SERPL-MCNC: 7.5 G/DL (ref 6–8.5)
RBC # BLD AUTO: 3.71 10*6/MM3 (ref 4.14–5.8)
SODIUM SERPL-SCNC: 136 MMOL/L (ref 136–145)
WBC NRBC COR # BLD: 10.63 10*3/MM3 (ref 3.4–10.8)

## 2022-06-22 PROCEDURE — 83880 ASSAY OF NATRIURETIC PEPTIDE: CPT

## 2022-06-22 PROCEDURE — 80053 COMPREHEN METABOLIC PANEL: CPT

## 2022-06-22 PROCEDURE — 82306 VITAMIN D 25 HYDROXY: CPT

## 2022-06-22 PROCEDURE — 83036 HEMOGLOBIN GLYCOSYLATED A1C: CPT

## 2022-06-22 PROCEDURE — 52000 CYSTOURETHROSCOPY: CPT | Performed by: UROLOGY

## 2022-06-22 PROCEDURE — 99490 CHRNC CARE MGMT STAFF 1ST 20: CPT | Performed by: FAMILY MEDICINE

## 2022-06-22 PROCEDURE — 99439 CHRNC CARE MGMT STAF EA ADDL: CPT | Performed by: FAMILY MEDICINE

## 2022-06-22 PROCEDURE — 85025 COMPLETE CBC W/AUTO DIFF WBC: CPT

## 2022-06-22 PROCEDURE — 36415 COLL VENOUS BLD VENIPUNCTURE: CPT

## 2022-06-22 RX ORDER — FINASTERIDE 5 MG/1
5 TABLET, FILM COATED ORAL DAILY
Qty: 90 TABLET | Refills: 0 | Status: SHIPPED | OUTPATIENT
Start: 2022-06-22 | End: 2023-01-31

## 2022-06-22 RX ORDER — METOLAZONE 2.5 MG/1
TABLET ORAL
Qty: 20 TABLET | Refills: 1 | OUTPATIENT
Start: 2022-06-22

## 2022-06-22 NOTE — PROGRESS NOTES
Cystoscopy    Date/Time: 6/22/2022 2:06 PM  Performed by: Teresita White MD  Authorized by: Teresita White MD   Preparation: Patient was prepped and draped in the usual sterile fashion.  Local anesthesia used: yes    Anesthesia:  Local anesthesia used: yes  Local Anesthetic: topical anesthetic  Anesthetic total: 12 mL    Sedation:  Patient sedated: no        Indication.  Urinary retention.    Patient has a Doyle catheter.    Diabetes mellitus and possible neurogenic bladder    Patient was placed in lithotomy position.  Thorough scrubbing of lower abdomen and external genitalia was performed with Hibiclens.  18 Olympus flexible cystoscope was inserted into the urethra, which was normal.  Prostate gland is not very large but does have slight obstruction.  Both ureteral orifices are normal.  Bladder is trabeculated.  There is no bladder tumor present.  Patient has spotty inflammation of the urinary bladder where the catheter balloon resided but no tumor.  Patient felt like that his bladder is full so I removed the flexible cystoscope.  Patient could not urinate and I went to have his catheterization and emptied the urinary bladder.    Patient son is quadriplegic and mother has to catheterizing 4 times a day.  Since she cannot catheterize his son and knows how to do it I will recommend that she catheterizes him 4 times a day and she is willing to do it.  I will recheck him in 2 weeks time and I am hoping may be some of the bladder function will come back.  We can probably do a UroLift to open him up even bladder but before that we might do urodynamic testings on the patient.  Patient tolerated her procedure well.

## 2022-06-22 NOTE — OUTREACH NOTE
AMBULATORY CASE MANAGEMENT NOTE    Name and Relationship of Patient/Support Person: Kami Wallis - Emergency Contact    Kami and Gómez stopped by with all of Gómez's medications.  2 weeks were filled up in planner, however he is out of Finasteride, Gabapentin, Nifedipine and Lantus. Reached out to Dr. White, if he would like for Gómez to continue on both Tamsulosin and Finasteride, to send in refill today.  Gabapentin, Nifedipine and Lantus had remaining refills.  Will send in all new Rx after July 1, 2022 - insurance will .     Informed labs to do today if possible     Education Documentation  No documentation found.        ALISSON GOMEZ  Ambulatory Case Management    6/22/2022, 12:50 EDT     Last iron (Ferrlecit) infusion at [facility] 5/4/22 - 2 bags.

## 2022-06-22 NOTE — TELEPHONE ENCOUNTER
PA sent to plan covermymeds    Drug  Farxiga 5MG tablets    Key: [id number]    Form  [place] [employer] Electronic PA Form          PA Requirement*  Farxiga 5MG tablets Required  Lantus SoloStar Not Required  Levemir FlexTouch Not Required  MetFORMIN HCl Not Required  Alogliptin Benzoate Required  Basaglar KwikPen Required  Glyxambi Required  Invokana Required  Januvia Required  Jardiance Required  Ozempic (0.25 or 0.5 MG/DOSE) Required  Rybelsus Required  Steglatro Required  Tradjenta Required  Trijardy XR Required  Trulicity Required

## 2022-06-22 NOTE — ADDENDUM NOTE
Addended by: ALISSON PALACIOS on: 6/22/2022 12:44 PM     Modules accepted: Level of Service, SmartSet

## 2022-06-23 ENCOUNTER — PATIENT ROUNDING (BHMG ONLY) (OUTPATIENT)
Dept: CARDIOLOGY | Facility: CLINIC | Age: 57
End: 2022-06-23

## 2022-06-23 NOTE — PROGRESS NOTES
June 23, 2022    Hello, may I speak with Preston Wallis?    My name is Thu Judd.       I am  with [facility]  [facility] [place] [address] [phone].    Before we get started may I verify your date of birth? 1965    I am calling to officially welcome you to our practice and ask about your recent visit. Is this a good time to talk? no    Thank you, and have a great day.

## 2022-06-27 ENCOUNTER — PRIOR AUTHORIZATION (OUTPATIENT)
Dept: FAMILY MEDICINE CLINIC | Age: 57
End: 2022-06-27

## 2022-06-27 NOTE — TELEPHONE ENCOUNTER
PA for Finasteride sent to plan via CoverMyMeds    Form  [place] [employer] Electronic PA Form    Key: [id number]

## 2022-06-27 NOTE — TELEPHONE ENCOUNTER
PA for Bydureon sent to plan via CoverMyMeds    Key: [id number]    Form  [place] [employer] Electronic PA Form

## 2022-06-29 ENCOUNTER — PATIENT OUTREACH (OUTPATIENT)
Dept: CASE MANAGEMENT | Facility: OTHER | Age: 57
End: 2022-06-29

## 2022-06-29 DIAGNOSIS — N18.32 STAGE 3B CHRONIC KIDNEY DISEASE (CKD): Primary | ICD-10-CM

## 2022-06-29 DIAGNOSIS — D50.8 IRON DEFICIENCY ANEMIA SECONDARY TO INADEQUATE DIETARY IRON INTAKE: ICD-10-CM

## 2022-06-29 DIAGNOSIS — Z79.4 UNCONTROLLED TYPE 2 DIABETES MELLITUS WITH HYPERGLYCEMIA, WITH LONG-TERM CURRENT USE OF INSULIN: ICD-10-CM

## 2022-06-29 DIAGNOSIS — E11.65 UNCONTROLLED TYPE 2 DIABETES MELLITUS WITH HYPERGLYCEMIA, WITH LONG-TERM CURRENT USE OF INSULIN: ICD-10-CM

## 2022-06-29 DIAGNOSIS — N40.1 BENIGN PROSTATIC HYPERPLASIA WITH URINARY RETENTION: ICD-10-CM

## 2022-06-29 DIAGNOSIS — R33.8 BENIGN PROSTATIC HYPERPLASIA WITH URINARY RETENTION: ICD-10-CM

## 2022-06-29 PROCEDURE — 99487 CPLX CHRNC CARE 1ST 60 MIN: CPT | Performed by: FAMILY MEDICINE

## 2022-06-29 PROCEDURE — 99489 CPLX CHRNC CARE EA ADDL 30: CPT | Performed by: FAMILY MEDICINE

## 2022-06-30 ENCOUNTER — OFFICE VISIT (OUTPATIENT)
Dept: FAMILY MEDICINE CLINIC | Age: 57
End: 2022-06-30

## 2022-06-30 VITALS
SYSTOLIC BLOOD PRESSURE: 135 MMHG | WEIGHT: 272 LBS | BODY MASS INDEX: 40.29 KG/M2 | HEIGHT: 69 IN | DIASTOLIC BLOOD PRESSURE: 73 MMHG | TEMPERATURE: 98.4 F | OXYGEN SATURATION: 95 % | HEART RATE: 85 BPM

## 2022-06-30 DIAGNOSIS — G89.29 CHRONIC BILATERAL LOW BACK PAIN, UNSPECIFIED WHETHER SCIATICA PRESENT: ICD-10-CM

## 2022-06-30 DIAGNOSIS — N40.1 BENIGN PROSTATIC HYPERPLASIA WITH NOCTURIA: ICD-10-CM

## 2022-06-30 DIAGNOSIS — I10 ESSENTIAL (PRIMARY) HYPERTENSION: ICD-10-CM

## 2022-06-30 DIAGNOSIS — D50.9 IRON DEFICIENCY ANEMIA, UNSPECIFIED IRON DEFICIENCY ANEMIA TYPE: ICD-10-CM

## 2022-06-30 DIAGNOSIS — G62.9 PERIPHERAL POLYNEUROPATHY: ICD-10-CM

## 2022-06-30 DIAGNOSIS — N31.9 NEUROGENIC BLADDER: Primary | ICD-10-CM

## 2022-06-30 DIAGNOSIS — N18.31 STAGE 3A CHRONIC KIDNEY DISEASE: ICD-10-CM

## 2022-06-30 DIAGNOSIS — Z79.899 HIGH RISK MEDICATION USE: ICD-10-CM

## 2022-06-30 DIAGNOSIS — I48.0 PAROXYSMAL ATRIAL FIBRILLATION: ICD-10-CM

## 2022-06-30 DIAGNOSIS — N18.30 TYPE 1 DIABETES MELLITUS WITH STAGE 3 CHRONIC KIDNEY DISEASE, UNSPECIFIED WHETHER STAGE 3A OR 3B CKD: ICD-10-CM

## 2022-06-30 DIAGNOSIS — R35.1 BENIGN PROSTATIC HYPERPLASIA WITH NOCTURIA: ICD-10-CM

## 2022-06-30 DIAGNOSIS — M54.50 CHRONIC BILATERAL LOW BACK PAIN, UNSPECIFIED WHETHER SCIATICA PRESENT: ICD-10-CM

## 2022-06-30 DIAGNOSIS — E10.22 TYPE 1 DIABETES MELLITUS WITH STAGE 3 CHRONIC KIDNEY DISEASE, UNSPECIFIED WHETHER STAGE 3A OR 3B CKD: ICD-10-CM

## 2022-06-30 LAB
AMPHET+METHAMPHET UR QL: NEGATIVE
AMPHETAMINE INTERNAL CONTROL: NORMAL
AMPHETAMINES UR QL: NEGATIVE
BARBITURATE INTERNAL CONTROL: NORMAL
BARBITURATES UR QL SCN: NEGATIVE
BENZODIAZ UR QL SCN: NEGATIVE
BENZODIAZEPINE INTERNAL CONTROL: NORMAL
BUPRENORPHINE INTERNAL CONTROL: NORMAL
BUPRENORPHINE SERPL-MCNC: NEGATIVE NG/ML
CANNABINOIDS SERPL QL: NEGATIVE
COCAINE INTERNAL CONTROL: NORMAL
COCAINE UR QL: NEGATIVE
EXPIRATION DATE: NORMAL
Lab: NORMAL
MDMA (ECSTASY) INTERNAL CONTROL: NORMAL
MDMA UR QL SCN: NEGATIVE
METHADONE INTERNAL CONTROL: NORMAL
METHADONE UR QL SCN: NEGATIVE
METHAMPHETAMINE INTERNAL CONTROL: NORMAL
OPIATES INTERNAL CONTROL: NORMAL
OPIATES UR QL: NEGATIVE
OXYCODONE INTERNAL CONTROL: NORMAL
OXYCODONE UR QL SCN: NEGATIVE
PCP UR QL SCN: NEGATIVE
PHENCYCLIDINE INTERNAL CONTROL: NORMAL
THC INTERNAL CONTROL: NORMAL

## 2022-06-30 PROCEDURE — 99214 OFFICE O/P EST MOD 30 MIN: CPT | Performed by: FAMILY MEDICINE

## 2022-06-30 PROCEDURE — 80305 DRUG TEST PRSMV DIR OPT OBS: CPT | Performed by: FAMILY MEDICINE

## 2022-06-30 NOTE — PROGRESS NOTES
Preston Wallis presents to [facility].    Chief Complaint:  PRATIBHA from hospital admit on 6/3 and d/c 6/9/22    Subjective       History of Present Illness:  HPI   Preston Wallis is a 56 y.o. male who presented to the hospital again and was recently admitted to [facility] on 6/3 and discharged on 6/5/22 with PMH of congestive heart failure with preserved ejection fraction, diastolic dysfunction (in addition he has chronic lower extremity edema), chronic hypoxic hypercapnic respiratory failure with chronic obesity/ hypoventilation syndrome as well as chronic obstructive pulmonary disease moderate to severe  chronic failure.  The reason for admission was prostate hypertrophy with obstruction and a Doyle catheter placed, chronic kidney disease stage III, and type 2 diabetes with neuropathy.  He also suffers from deconditioning because he does not ambulate or take good care of himself.  He was released 6/5 with a Doyle catheter in place.  It was advised that he be discharged to a skilled nursing facility due to his multiple medical issues and need for more medical intervention at home and patient once again refused to go to rehab and skilled nursing facility.  His main problem was his inability to void.  He has been referred to urology for follow-up outpatient.  While in the hospital he did not have any fevers, hypoxia, or acute issues on this admission.  He did have significant lower extremity edema while in the hospital along with shortness of air so he was diuresed with Bumex IV and started on steroids for possible COPD exacerbation.  He remained hemodynamically stable while in the hospital and a failed febrile throughout his hospital stay.  He was on 2 L of oxygen nasal cannula which is his baseline.  After initial IV diuresis with 3 L of output patient started to feel better but continued to have chronic wheezing and shortness of air.  His Bumex was increased to 2 mg twice a day on  discharge.  He was not discharged home on oral steroids.  He is on positive pressure CPAP for obstructive sleep apnea at home, he has not been very compliant with this in the past.    Discharge medications include Bumex 4 mg daily and increase to twice a day as needed, albuterol, by durian, Lipitor, aspirin, lispro sliding scale, Cristi-Citrate, iron, Pepcid, Lopressor, hydralazine 25 mg 3 times daily, trazodone, gabapentin, Cymbalta, Colace, Brovana, budesonide, Deppe lymph [place], DuoNebs, vitamin D, Eliquis, folate, Proscar, Singulair, Flomax, Procardia, MiraLAX, senna/D OCC, Tylenol.  Diltiazem was stopped on his medication list per cardiology who consulted him during his hospitalization    Discharge instructions low-sodium diet, cardiac diet.  Activity as tolerated.  Home health for PT OT and nursing.  Outpatient cardiology appointment in place, urology referral, nephrology referral.  Doyle care.    A referral has been placed by me for him dilatory care Case management who is now preplanning his medication at 2-week intervals.  It is very clear to me that he is noncompliant with his medications and is not taking them correctly.      He also has underlying hypertension, diabetes, GERD, hypercholesterolemia, sleep apnea, stage 3 renal failure as well as recurrent pneumonia and h/o MRSA and pseudomonas pneumonia.     Since discharge he has seen urology Dr. White twice Doyle has been removed and he is now performing in and out caths.  He is also seeing cardiology Dr. Ware and no medications were changed he also has follow-up appointment scheduled for both of those.  He also has a follow-up with his pulmonologist in August.     D/C meds: pepcid, metoprolol, hydralazine, trazodone, insulin, gabapentin, marcos, brovana, pulmicort, farxiga, mucines, duonebs, vit d, eliquis, folic acid, proscar, singulair, flomax, procardia.  NEWELY ON BUMEX, FLORANEX AND BACTRIM.      He also has low iron with iron deficiency anemia and  has a referral in place to hematology Dr. Hanson for further work-up and treatment plan August 12, 2022.  He had 2 iron infusions while in the hospital in May.    He also has a 15-year-old port in place and has a referral to Dr. Engel on 6/28/2022 and he is scheduled for removal of this port with replacement on 7/7/2022.  Will stop eliquis 3 days prior to surgery.     Labs: Blood cultures were negative.    BM are normal, eating ok.       BS running good, 129 this am, he is on 40 units basaglar.      Review of Systems:  Review of Systems   Constitutional: Negative for chills, fatigue and fever.   HENT: Negative for congestion, ear discharge and sore throat.    Respiratory: Negative for shortness of breath.    Cardiovascular: Negative for chest pain.   Gastrointestinal: Negative for abdominal pain, constipation, diarrhea, nausea, vomiting and GERD.   Genitourinary: Negative for flank pain.   Neurological: Negative for dizziness and headache.   Psychiatric/Behavioral: Negative for depressed mood.        Objective   Medical History:  Past Medical History:   • Age-related cognitive decline   • Allergic contact dermatitis   • Allergies   • Anemia   • Bronchiectasis with acute lower respiratory infection (HCC)   • Chest pain   • Chronic bronchitis (HCC)   • Chronic diastolic (congestive) heart failure (HCC)   • Chronic kidney disease   • Chronic respiratory failure with hypoxia (HCC)   • COPD (chronic obstructive pulmonary disease) (HCC)   • Cough   • Dependence on supplemental oxygen   • Eczema   • Erectile dysfunction    due to organic reasons   • Essential (primary) hypertension   • Fracture    closed fracture of other tarsal and metatarsal bones   • GERD without esophagitis   • High risk medication use   • Hypercholesteremia   • Hypomagnesemia   • Insomnia   • Low back pain   • Major depressive disorder   • Major depressive disorder   • Morbid (severe) obesity due to excess calories (HCC)   • MRSA pneumonia ([facility])    • Muscle weakness   • 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 without evidence of necrosis (HCC)   • Obstructive sleep apnea (adult) (pediatric)   • Other forms of dyspnea   • Other long term (current) drug therapy   • Other specified noninfective gastroenteritis and colitis   • Other spondylosis, lumbar region   • Pain in both knees   • Paroxysmal atrial fibrillation (HCC)   • Peripheral neuropathy    attributed to type 2 diabetes   • Pneumonia, unspecified organism   • Polyneuropathy   • Rash and other nonspecific skin eruption   • Syncope and collapse   • Tachycardia   • Type 1 diabetes mellitus with diabetic chronic kidney disease (HCC)   • Type 2 diabetes mellitus (HCC)   • Unspecified fall, initial encounter     Past Surgical History:   • CHOLECYSTECTOMY   • KNEE SURGERY   • OTHER SURGICAL HISTORY    venous port   • TONSILLECTOMY AND ADENOIDECTOMY      Family History   Problem Relation Age of Onset   • Coronary artery disease Mother    • Hypertension Mother    • Diabetes type II Mother    • Asthma Father    • Cancer Sister      Social History     Tobacco Use   • Smoking status: Former Smoker     Packs/day: 1.00     Years: 6.00     Pack years: 6.00     Types: Cigarettes     Quit date:      Years since quittin.5   • Smokeless tobacco: Never Used   Substance Use Topics   • Alcohol use: Not Currently       Health Maintenance Due   Topic Date Due   • COLORECTAL CANCER SCREENING  Never done   • COVID-19 Vaccine (1) Never done   • ANNUAL PHYSICAL  Never done   • Hepatitis B (1 of 3 - Risk 3-dose series) Never done   • Pneumococcal Vaccine 0-64 (2 - PCV) 1998   • ZOSTER VACCINE (1 of 2) Never done   • DIABETIC FOOT EXAM  Never done   • DIABETIC EYE EXAM  2021        Immunization History   Administered Date(s) Administered   • Flu Vaccine Quad PF >36MO 10/18/2016, 10/16/2017, 2019   • Flu Vaccine Split Quad 2019   • Influenza Quad Vaccine  (Inpatient) 09/19/2013   • Influenza, Unspecified 09/21/2020   • Pneumococcal Polysaccharide (PPSV23) 11/20/1997   • Tdap 09/18/2018       Allergies   Allergen Reactions   • Benadryl [Diphenhydramine] Itching   • Proventil [Albuterol] Other (See Comments)     Mouth sores          Medications:  Current Outpatient Medications on File Prior to Visit   Medication Sig   • albuterol sulfate  (90 Base) MCG/ACT inhaler Inhale 2 puffs 4 (Four) Times a Day As Needed for Wheezing.   • apixaban (Eliquis) 5 MG tablet tablet Take 1 tablet by mouth Every 12 (Twelve) Hours.   • aspirin 81 MG EC tablet Take 81 mg by mouth Daily.   • atorvastatin (LIPITOR) 20 MG tablet Take 1 tablet by mouth Every Night.   • Blood Glucose Monitoring Suppl w/Device kit 1 kit Take As Directed. Dx e11.9   • budesonide (PULMICORT) 0.5 MG/2ML nebulizer solution Take 2 mL by nebulization 2 (Two) Times a Day.   • bumetanide (BUMEX) 2 MG tablet Take 1 tablet twice daily .  Call cardiology  if weight is great then 2 pounds in one day or 5 pounds in a week   • calcium citrate (CALCITRATE) 950 (200 Ca) MG tablet Take 1 tablet by mouth Daily.   • Cholecalciferol (Vitamin D3) 50 MCG (2000 UT) tablet Take 1 tablet by mouth Daily.   • dapagliflozin (Farxiga) 5 MG tablet tablet Take 1 tablet by mouth Daily.   • docusate sodium (COLACE) 100 MG capsule TAKE 1 CAPSULE BY MOUTH TWICE DAILY   • DULoxetine (CYMBALTA) 60 MG capsule Take 1 capsule by mouth 2 (Two) Times a Day.   • exenatide er (Bydureon BCise) 2 MG/0.85ML auto-injector injection Inject 0.85 mL under the skin into the appropriate area as directed 1 (One) Time Per Week.   • famotidine (PEPCID) 40 MG tablet Take 1 tablet by mouth Daily.   • ferrous gluconate (FERGON) 324 MG tablet Take 1 tablet by mouth Daily With Breakfast.   • finasteride (PROSCAR) 5 MG tablet Take 1 tablet by mouth Daily.   • folic acid (FOLVITE) 1 MG tablet Take 1 tablet by mouth Daily.   • gabapentin (NEURONTIN) 300 MG capsule Take  1 capsule by mouth every night at bedtime.   • glucose blood test strip 1 each by Other route 4 (Four) Times a Day With Meals & at Bedtime. Use as instructed dx e 11.9   • guaifenesin-dextromethorphan (MUCINEX DM)  MG tablet sustained-release 12 hour tablet Take 1 tablet by mouth 2 (Two) Times a Day As Needed (congestion).   • hydrALAZINE (APRESOLINE) 50 MG tablet Take 1 tablet by mouth 2 (Two) Times a Day.   • Insulin Glargine (LANTUS SOLOSTAR) 100 UNIT/ML injection pen Inject 40 Units under the skin into the appropriate area as directed Daily.   • Insulin Lispro (ADMELOG SOLOSTAR SC) Inject  under the skin into the appropriate area as directed. Per SSI, if BS <150 = 0 units, 151-180= 1 unit, 181-210= 2units, 211-240= 3units, 241-270= 4units, 271-300= 5units, 301-330=6 untis, 331-390= 8units, <390 give 9units   • Insulin Pen Needle (B-D UF III MINI PEN NEEDLES) 31G X 5 MM misc Inject 1 each under the skin into the appropriate area as directed See Admin Instructions. with insulin   • ipratropium-albuterol (DUO-NEB) 0.5-2.5 mg/3 ml nebulizer Take 3 mL by nebulization Every 4 (Four) Hours As Needed for Wheezing or Shortness of Air. Cancel plain albuterol nebs   • metoprolol tartrate (LOPRESSOR) 100 MG tablet Take 1 tablet by mouth 2 (Two) Times a Day.   • montelukast (SINGULAIR) 10 MG tablet Take 1 tablet by mouth Every Night.   • NIFEdipine XL (PROCARDIA XL) 30 MG 24 hr tablet Take 30 mg by mouth Every Morning.   • O2 (OXYGEN) 2 Liter O2 - CONTINUOUS (route: Oxygen)   • salmeterol (Serevent Diskus) 50 MCG/DOSE diskus inhaler Inhale 1 puff 2 (Two) Times a Day.   • tamsulosin (FLOMAX) 0.4 MG capsule 24 hr capsule Take 2 capsules by mouth Every Evening.   • traZODone (DESYREL) 100 MG tablet Take 1 tablet by mouth Every Night.   • TRUEplus Lancets 28G misc USE AS DIRECTED     No current facility-administered medications on file prior to visit.       Vital Signs:   /73 (BP Location: Left arm, Patient Position:  "Sitting, Cuff Size: Adult)   Pulse 85   Temp 98.4 °F (36.9 °C) (Oral)   Ht 175.3 cm (69.02\")   Wt 123 kg (272 lb)   SpO2 95% Comment: room air  BMI 40.15 kg/m²       Physical Exam:  Physical Exam  Vitals and nursing note reviewed.   Constitutional:       General: He is not in acute distress.     Appearance: Normal appearance. He is not ill-appearing, toxic-appearing or diaphoretic.   HENT:      Head: Normocephalic and atraumatic.      Right Ear: Tympanic membrane, ear canal and external ear normal.      Left Ear: Tympanic membrane, ear canal and external ear normal.      Nose: No congestion or rhinorrhea.      Mouth/Throat:      Mouth: Mucous membranes are moist.      Pharynx: Oropharynx is clear. No oropharyngeal exudate or posterior oropharyngeal erythema.   Eyes:      Extraocular Movements: Extraocular movements intact.      Conjunctiva/sclera: Conjunctivae normal.      Pupils: Pupils are equal, round, and reactive to light.   Cardiovascular:      Rate and Rhythm: Normal rate and regular rhythm.      Heart sounds: Normal heart sounds.   Pulmonary:      Effort: Pulmonary effort is normal.      Breath sounds: Normal breath sounds. No wheezing, rhonchi or rales.   Abdominal:      General: Abdomen is flat.      Palpations: Abdomen is soft.   Musculoskeletal:      Cervical back: Neck supple. No rigidity.   Lymphadenopathy:      Cervical: No cervical adenopathy.   Skin:     General: Skin is warm and dry.   Neurological:      Mental Status: He is alert and oriented to person, place, and time.   Psychiatric:         Mood and Affect: Mood normal.         Behavior: Behavior normal.         Result Review      The following data was reviewed by Kimmy Riley MD on 06/30/2022.  Lab Results   Component Value Date    WBC 10.63 06/22/2022    HGB 10.2 (L) 06/22/2022    HCT 33.2 (L) 06/22/2022    MCV 89.5 06/22/2022     06/22/2022     Lab Results   Component Value Date    GLUCOSE 326 (H) 06/22/2022    BUN 24 (H) " 06/22/2022    CREATININE 1.62 (H) 06/22/2022    EGFRIFNONA 46 (L) 07/27/2021    BCR 14.8 06/22/2022    K 4.2 06/22/2022    CO2 31.1 (H) 06/22/2022    CALCIUM 9.3 06/22/2022    ALBUMIN 3.80 06/22/2022    LABIL2 0.9 (L) 09/30/2020    AST 19 06/22/2022    ALT 20 06/22/2022     Lab Results   Component Value Date    CHOL 131 03/17/2022    CHLPL 165 08/26/2020    TRIG 69 03/17/2022    HDL 65 (H) 03/17/2022    LDL 52 03/17/2022     Lab Results   Component Value Date    TSH 0.580 03/17/2020     Lab Results   Component Value Date    HGBA1C 8.30 (H) 06/22/2022     Lab Results   Component Value Date    PSA 0.725 03/17/2022                       Assessment and Plan:          Diagnoses and all orders for this visit:    1. Neurogenic bladder (Primary)  Comments:  encouraged warm bladder stimulation and pelvic floor exercises, cont I and O cath    2. Type 1 diabetes mellitus with stage 3 chronic kidney disease, unspecified whether stage 3a or 3b CKD (HCC)    3. Peripheral polyneuropathy    4. Chronic bilateral low back pain, unspecified whether sciatica present  -     POC Urine Drug Screen [facility]    5. Iron deficiency anemia, unspecified iron deficiency anemia type    6. Essential (primary) hypertension    7. Paroxysmal atrial fibrillation (HCC)    8. Benign prostatic hyperplasia with nocturia    9. Stage 3a chronic kidney disease (HCC)    10. High risk medication use  -     POC Urine Drug Screen [facility]      Time he is overall doing well.  His wife is is self cathing without any difficulty but patient refuses to do it on his own.  His blood sugars are stable and well-controlled.  His chronic lower back pain with peripheral neuropathy pain are stable.  His today's is face-to-face and he is due for his urine tox for his gabapentin refills.  Urine screen was within normal limits.  His blood pressures are stable and well-controlled.  He has paroxysmal A. fib and is not currently in A. fib at this time.  Chronic  kidney disease is stable.  For his neurogenic bladder I encouraged bladder stimulation and pelvic floor exercises and to continue TREVOR cath.  Dr. Riley    Follow Up   No follow-ups on file.

## 2022-06-30 NOTE — OUTREACH NOTE
AMBULATORY CASE MANAGEMENT NOTE    Name and Relationship of Patient/Support Person:  -     Called Dr. Engel's office for office note, scheduled for 7/7/22 for Port replacement.  Called Dr. Hanson office, scheduled for 8/12/22 8:30am -iron.    Updated Dr. Riley with progress.     ALISSON R  Ambulatory Case Management    6/30/2022, 15:01 EDT    [facility] End of Month Documentation    This Chronic Medical Management Care Plan for Preston Wallis, 57 y.o. male, has been a new plan of care implemented and a new plan of care implemented for the month of June.  A cumulative time of 131  minutes was spent on this patient record this month, including face to face visit with provider and patient; phone call with care giver; electronic communication with other providers; electronic communication with primary care provider; electronic communication with pharmacist; chart review; phone call with pharmacist.    Regarding the patient's problems: has Type 1 diabetes mellitus with diabetic chronic kidney disease ([facility]); Polyneuropathy; Peripheral neuropathy; Paroxysmal atrial fibrillation ([facility]); Obstructive sleep apnea (adult) (pediatric); MRSA pneumonia ([facility]); Low back pain; Insomnia; Essential (primary) hypertension; Chronic diastolic (congestive) heart failure (HCC); Allergies; COPD (chronic obstructive pulmonary disease) ([facility]); Chronic anticoagulation; Benign prostatic hyperplasia; Doyle catheter in place; Microscopic hematuria; Impaired mobility and endurance; Stage 3b chronic kidney disease (CKD) ([facility]); Iron deficiency anemia secondary to inadequate dietary iron intake; Vitamin D deficiency due to chronic kidney disease; Class 3 severe obesity with serious comorbidity in adult (HCC); Lower extremity edema; and Elevated alkaline phosphatase level on their problem list., the following items were addressed: medications; transitions to medical care; medical records; referrals to community service providers and any changes can be found  within the plan section of the note.  A detailed listing of time spent for chronic care management is tracked within each outreach encounter.  Current medications include:  has a current medication list which includes the following prescription(s): albuterol sulfate hfa, apixaban, aspirin, atorvastatin, blood glucose monitoring suppl, budesonide, bumetanide, calcium citrate, vitamin d3, farxiga, docusate sodium, duloxetine, bydureon bcise, famotidine, ferrous gluconate, finasteride, folic acid, gabapentin, glucose blood, guaifenesin-dextromethorphan, hydralazine, insulin glargine, insulin lispro, b-d uf iii mini pen needles, ipratropium-albuterol, metoprolol tartrate, montelukast, nifedipine xl, o2, serevent diskus, tamsulosin, trazodone, and trueplus lancets 28g. and the patient is reported to be caregiver will take responsibility for med compliance,  Medications are reported to be non-effective in controlling symptoms and changes have been made to the medication protocol.  Regarding these diagnoses, referrals were made to the following provider(s):  specialist.  All notes on chart for PCP to review.    The patient was monitored remotely for pain; medications.    The patient's physical needs include:  help taking medications as prescribed; medication education; needs assistance with ADLs; physical healthcare; resources for disability needs; physician referral.     The patient's mental support needs include:  continued support    The patient's cognitive support needs include:  medication; continued support; needs assistance with ADLs    The patient's psychosocial support needs include:  continued support; coordination of community providers; medication management or adherence    The patient's functional needs include: health care coverage; medication education; needs assistance for ADLs; physical healthcare; physician referral; resources for disability needs    The patient's environmental needs include:  resources  for disability needs    Care Plan overall comments:  Gómez is a new referral, medication management to keep him out of the hospital.    Refer to previous outreach notes for more information on the areas listed above.    Monthly Billing Diagnoses  (N18.32) Stage 3b chronic kidney disease (CKD) ([facility])    (D50.8) Iron deficiency anemia secondary to inadequate dietary iron intake    (E11.65,  Z79.4) Uncontrolled type 2 diabetes mellitus with hyperglycemia, with long-term current use of insulin ([facility])    (N40.1,  R33.8) Benign prostatic hyperplasia with urinary retention    Medications   · Medications have been reconciled    Care Plan progress this month:      Recently Modified Care Plans Updates made since 5/30/2022 12:00 AM     Wellness (Adult)         Problem Priority Last Modified     ELS MEDICATION ADHERENCE (WELLNESS) (ADULT) --  6/9/2022  3:33 PM by Tara Rosado, RN              Goal Recent Progress Last Modified     Medication Adherence Maintained --  6/9/2022  3:33 PM by Tara Rosado, RN     Evidence-based guidance:   Develop a complete and accurate medication list including those prescribed and over-the-counter, those taken only occasionally and those not taken by mouth such as injections, inhalers, ointments or creams and drops.   Review all medications to determine if patient or caregiver knows why the medications are given and if taken as prescribed.   Complete or review a medication adherence assessment including barriers to medication adherence.   Arrange and encourage counseling and medication review by pharmacist.   Assess barriers to medication adherence.   Manage poor understanding or health literacy by using easy to understand language, teach-back, visual aids and teaching only 2 or 3 points at a time.   Assess presence of side effects; provide suggestions to manage or reduce side effects.   Consult with provider and/or pharmacist regarding substitute medication, changing dose, simplification of  regimen or safe discontinuation of some medications.   Encourage the use of medication reminders such as clock or cell phone alarm, color coding, pillboxes for am/pm and days of the week, pharmacy refill reminder, auto-refill system or mail-order option.   Assist with resources when cost is a barrier; refer to prescription assistance programs; confirm that generics are prescribed whenever possible; consider 90-day prescriptions to reduce copay cost; synchronize refills.   Provide help to complete medication assistance applications or health insurance forms as needed.   Complete a follow-up call 2 to 3 weeks after medication self-management plan developed; assess adherence and understanding, as well as listen to patient or caregiver concerns; amend plan as needed.   Provide frequent follow-up providing motivation, encouragement and support when medication nonadherence is identified.    Notes:              Task Due Date Last Modified     Optimize Medication Use --  6/22/2022  1:58 PM by Tara Rosado RN     Care Management Activities:      - barriers to medication adherence identified  - medication list compiled  - medication list reviewed  - medication-adherence assessment completed  - self-management plan initiated or updated      Notes:                   Heart Failure (Adult)         Problem Priority Last Modified     ELS SYMPTOM EXACERBATION (HEART FAILURE) (ADULT) --  6/9/2022  3:33 PM by Tara Rosado RN              Goal Recent Progress Last Modified     Symptom Exacerbation Prevented or Minimized --  6/9/2022  3:33 PM by Tara Rosado RN     Evidence-based guidance:   Perform or review cognitive and/or health literacy screening.   Assess understanding of adherence and barriers to treatment plan, as well as lifestyle changes; develop strategies to address barriers.   Establish a [id number] early intervention process to communicate with primary care provider when signs/symptoms worsen.    Facilitate timely posthospital discharge or emergency department treatment that includes intensive follow-up via telephone calls, home visit, telehealth monitoring and care at multidisciplinary heart failure clinic.   Adjust frequency and intensity of follow-up based on presentation, number of emergency department visits, hospital admissions and frequency and severity of symptom exacerbation.   Facilitate timely visit, usually within 1 week, with primary care provider following hospital discharge.   Collaborate with clinical pharmacist to address adverse drug reactions, drug interactions, subtherapeutic dosage, patient and family education.   Regularly screen for presence of depressive symptoms using a validated tool; consider pharmacologic therapy and/or referral for cognitive behavioral therapy when present.   Refer to community-based services, such as a heart failure support group, community health worker or peer support program.   Review immunization status; arrange receipt of needed vaccinations.   Prepare patient for home oxygen use based on signs/symptoms.    Notes:              Task Due Date Last Modified     Identify and Minimize Risk of Heart Failure Exacerbation --  6/21/2022 10:51 AM by Tara Rosado RN     Care Management Activities:      - in-home support services arranged  - medication-adherence assessment completed  - self-awareness of signs/symptoms of worsening disease encouraged      Notes:                Problem Priority Last Modified     ELS DISEASE PROGRESSION (HEART FAILURE) (ADULT) --  6/9/2022  3:33 PM by Tara Rosado, RN              Goal Recent Progress Last Modified     Comorbidities Identified and Managed --  6/9/2022  3:33 PM by Tara Rosado, RN     Evidence-based guidance:   Assess and address signs/symptoms of comorbidity, including dyslipidemia, diabetes, iron deficiency, gout, arthritis, dysrhythmia, hypertension, cachexia, coronary artery disease, kidney dysfunction and  lung disease.   Prepare patient for laboratory and diagnostic exams based on risk and presentation.   Prepare for use of pharmacologic therapy that may include statin, angiotensin converting enzyme (ACE) inhibitor, angiotensin receptor blocker (ARB), beta-blocker, digoxin, antidysrhythmic, diuretic or omega-3 fatty acid.   Monitor side effects and anticipate need for periodic adjustments.   Prepare patient for potential invasive treatment, such as implantable cardioverter-defibrillator, cardiac resynchronization therapy or heart transplant as disease progresses.    Notes:              Task Due Date Last Modified     Identify and Manage Comorbidities --  6/9/2022  3:34 PM by Tara Rosado, LEN     Care Management Activities:      - response to pharmacologic therapy monitored      Notes:                Goal Recent Progress Last Modified     Health Optimized --  6/9/2022  3:33 PM by Tara Rosado RN     Evidence-based guidance:   Use brief intervention, such as 5 A's (Ask, Advise, Assess, Assist, Arrange) to encourage smoking cessation; refer to smoking cessation program, if ready for more intensive intervention.   Perform or refer to a registered dietitian for a nutrition assessment and nutrition-focused physical exam.    Identify potential micronutrient deficiencies, such as iron, vitamin D and thiamin.   Assess need for potential diet and fluid modification, such as reduced sodium or fluid intake.   Minimize unnecessary dietary restrictions to increase oral intake. Note: Sodium restriction should be individualized to the patient and clinical status.   Facilitate home monitoring of weight.    Notes:              Task Due Date Last Modified     Optimize Health --  6/9/2022  3:34 PM by Tara Rosado, RN     Care Management Activities:      - fluid modification encouraged  - home monitoring of blood pressure encouraged  - home monitoring of weight gain or loss encouraged      Notes:                Problem Priority  Last Modified     ELS ACTIVITY TOLERANCE (HEART FAILURE) (ADULT) --  6/9/2022  3:33 PM by Alisson Rosado, RN              Goal Recent Progress Last Modified     Activity Tolerance Optimized --  6/9/2022  3:33 PM by Alisson Rosado, RN     Evidence-based guidance:   Promote daily physical activity that improves functional ability, cognition and quality of life.   Encourage reduction in sedentary time.    Encourage optimal, safe functional mobility and self-care performance based on ability and tolerance.    Promote breathing and energy conservation techniques, such as pursed-lip breathing, preplanning and pacing of activity, balancing activity and rest.   Encourage participation in cardiac rehabilitation services.    Notes:              Task Due Date Last Modified     Maintain Strength and Functional Ability --  6/9/2022  3:35 PM by Alisson Rosado RN     Care Management Activities:      - not discussed during this outreach      Notes:                          Instructions   · Patient was provided an electronic copy of care plan  · CCM services were explained and offered and patient has accepted these services.  · Patient has given their written consent to receive CCM services and understands that this includes the authorization of electronic communication of medical information with the other treating providers.  · Patient understands that they may stop CCM services at any time and these changes will be effective at the end of the calendar month and will effectively revocate the agreement of CCM services.  · Patient understands that only one practitioner can furnish and be paid for CCM services during one calendar month.  Patient also understands that there may be co-payment or deductible fees in association with CCM services.  · Patient will continue with at least monthly follow-up calls with the Nurse Navigator.    ALISSON GOMEZ  Ambulatory Case Management    6/30/2022, 15:01 EDT

## 2022-07-06 ENCOUNTER — OFFICE VISIT (OUTPATIENT)
Dept: UROLOGY | Facility: CLINIC | Age: 57
End: 2022-07-06

## 2022-07-06 ENCOUNTER — PATIENT OUTREACH (OUTPATIENT)
Dept: CASE MANAGEMENT | Facility: OTHER | Age: 57
End: 2022-07-06

## 2022-07-06 VITALS
HEIGHT: 69 IN | TEMPERATURE: 98.7 F | SYSTOLIC BLOOD PRESSURE: 152 MMHG | HEART RATE: 86 BPM | BODY MASS INDEX: 40.29 KG/M2 | WEIGHT: 272 LBS | DIASTOLIC BLOOD PRESSURE: 59 MMHG

## 2022-07-06 DIAGNOSIS — R30.0 DYSURIA: Primary | ICD-10-CM

## 2022-07-06 DIAGNOSIS — N18.32 STAGE 3B CHRONIC KIDNEY DISEASE (CKD): Primary | ICD-10-CM

## 2022-07-06 DIAGNOSIS — I50.32 CHRONIC DIASTOLIC (CONGESTIVE) HEART FAILURE: ICD-10-CM

## 2022-07-06 DIAGNOSIS — E11.65 UNCONTROLLED TYPE 2 DIABETES MELLITUS WITH HYPERGLYCEMIA, WITH LONG-TERM CURRENT USE OF INSULIN: Primary | ICD-10-CM

## 2022-07-06 DIAGNOSIS — N40.1 BENIGN PROSTATIC HYPERPLASIA WITH NOCTURIA: ICD-10-CM

## 2022-07-06 DIAGNOSIS — Z79.4 UNCONTROLLED TYPE 2 DIABETES MELLITUS WITH HYPERGLYCEMIA, WITH LONG-TERM CURRENT USE OF INSULIN: Primary | ICD-10-CM

## 2022-07-06 DIAGNOSIS — N31.9 NEUROGENIC BLADDER: ICD-10-CM

## 2022-07-06 DIAGNOSIS — R35.1 BENIGN PROSTATIC HYPERPLASIA WITH NOCTURIA: ICD-10-CM

## 2022-07-06 DIAGNOSIS — I10 ESSENTIAL (PRIMARY) HYPERTENSION: ICD-10-CM

## 2022-07-06 DIAGNOSIS — R33.9 URINARY RETENTION: ICD-10-CM

## 2022-07-06 LAB
BACTERIA UR QL AUTO: ABNORMAL /HPF
BILIRUB BLD-MCNC: NEGATIVE MG/DL
CLARITY, POC: CLEAR
COLOR UR: YELLOW
EPI CELLS #/AREA URNS HPF: 0 /[HPF]
GLUCOSE UR STRIP-MCNC: ABNORMAL MG/DL
KETONES UR QL: NEGATIVE
LEUKOCYTE EST, POC: NEGATIVE
NITRITE UR-MCNC: NEGATIVE MG/ML
PH UR: 6 [PH] (ref 5–8)
PROT UR STRIP-MCNC: ABNORMAL MG/DL
RBC # UR STRIP: ABNORMAL /HPF
RBC # UR STRIP: NEGATIVE /UL
RENAL EPITHELIAL, POC: 0
SP GR UR: 1.01 (ref 1–1.03)
UNIDENT CRYS URNS QL MICRO: ABNORMAL /HPF
UROBILINOGEN UR QL: NORMAL
WBC # UR STRIP: ABNORMAL /HPF

## 2022-07-06 PROCEDURE — 87086 URINE CULTURE/COLONY COUNT: CPT | Performed by: UROLOGY

## 2022-07-06 PROCEDURE — 81002 URINALYSIS NONAUTO W/O SCOPE: CPT | Performed by: UROLOGY

## 2022-07-06 PROCEDURE — 99213 OFFICE O/P EST LOW 20 MIN: CPT | Performed by: UROLOGY

## 2022-07-06 PROCEDURE — 51701 INSERT BLADDER CATHETER: CPT | Performed by: UROLOGY

## 2022-07-06 RX ORDER — METOPROLOL TARTRATE 100 MG/1
100 TABLET ORAL 2 TIMES DAILY
Qty: 180 TABLET | Refills: 1 | Status: SHIPPED | OUTPATIENT
Start: 2022-07-06 | End: 2022-11-28 | Stop reason: SDUPTHER

## 2022-07-06 RX ORDER — DILTIAZEM HYDROCHLORIDE 120 MG/1
CAPSULE, EXTENDED RELEASE ORAL
COMMUNITY
Start: 2022-05-17 | End: 2022-07-21 | Stop reason: ALTCHOICE

## 2022-07-06 RX ORDER — BUMETANIDE 2 MG/1
TABLET ORAL
Qty: 180 TABLET | Refills: 0 | Status: SHIPPED | OUTPATIENT
Start: 2022-07-06 | End: 2022-09-29 | Stop reason: SDUPTHER

## 2022-07-06 RX ORDER — FERROUS SULFATE 325(65) MG
TABLET ORAL
COMMUNITY
Start: 2022-05-16 | End: 2022-07-21

## 2022-07-06 RX ORDER — LANOLIN ALCOHOL/MO/W.PET/CERES
CREAM (GRAM) TOPICAL
COMMUNITY
Start: 2022-05-17 | End: 2022-07-21

## 2022-07-06 RX ORDER — TAMSULOSIN HYDROCHLORIDE 0.4 MG/1
2 CAPSULE ORAL EVERY EVENING
Qty: 180 CAPSULE | Refills: 1 | Status: SHIPPED | OUTPATIENT
Start: 2022-07-06 | End: 2022-09-12

## 2022-07-06 RX ORDER — DOCUSATE SODIUM AND SENNOSIDES 8.6; 5 MG/1; MG/1
TABLET ORAL
COMMUNITY
Start: 2022-05-16 | End: 2022-07-21

## 2022-07-06 RX ORDER — ASPIRIN 81 MG/1
TABLET, CHEWABLE ORAL
COMMUNITY
Start: 2022-05-16 | End: 2022-07-21

## 2022-07-06 RX ORDER — FOLIC ACID 1 MG/1
1 TABLET ORAL DAILY
Qty: 90 TABLET | Refills: 1 | Status: SHIPPED | OUTPATIENT
Start: 2022-07-06 | End: 2022-11-21

## 2022-07-06 NOTE — OUTREACH NOTE
AMBULATORY CASE MANAGEMENT NOTE    Name and Relationship of Patient/Support Person: Preston Wallis - Self    Elizabeth and Gómez dropped off Gómez meds.  Filled planner.  Called Harinder pharmacist at [facility] for refills (medicaid only paid for 30 day supply).  Sent in remaining prescriptions that needed to be filled.    Gómez did see urology today and scheduled again in a month for a follow up.  Scheduled a follow up in 6 weeks with PCP.    Having surgery in am for port placement.     Education Documentation  No documentation found.        ALISSON GOMEZ  Ambulatory Case Management    7/6/2022, 14:35 EDT

## 2022-07-06 NOTE — PROGRESS NOTES
"Chief Complaint  Follow-up for urinary retention.    Patient's wife catheterizes him.    Subjective No acute distress        Preston Wallis presents to [facility] UROLOGY  History of Present Illness    57-year-old white male has possible diabetic neurogenic bladder.  He urinates small amount but that burns him.  Volume of the urinary bladder sometimes exceeds 1000 mL because he takes diuretics.  No fever or chills    Objective No acute distress  Vital Signs:   /59   Pulse 86   Temp 98.7 °F (37.1 °C)   Ht 175.3 cm (69.02\")   Wt 123 kg (272 lb)   BMI 40.14 kg/m²     Allergies   Allergen Reactions   • Benadryl [Diphenhydramine] Itching   • Proventil [Albuterol] Other (See Comments)     Mouth sores        Past medical history:  has a past medical history of Age-related cognitive decline, Allergic contact dermatitis, Allergies, Anemia, Bronchiectasis with acute lower respiratory infection ([facility]), Chest pain, Chronic bronchitis ([facility]), Chronic diastolic (congestive) heart failure ([facility]), Chronic kidney disease, Chronic respiratory failure with hypoxia ([facility]), COPD (chronic obstructive pulmonary disease) ([facility]), Cough, Dependence on supplemental oxygen, Eczema, Erectile dysfunction, Essential (primary) hypertension, Fracture, GERD without esophagitis, High risk medication use, Hypercholesteremia, Hypomagnesemia, Insomnia, Low back pain, Major depressive disorder, Major depressive disorder, Morbid (severe) obesity due to excess calories ([facility]), MRSA pneumonia ([facility]), Muscle weakness, 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 without evidence of necrosis ([facility]), Obstructive sleep apnea (adult) (pediatric), Other forms of dyspnea, Other long term (current) drug therapy, Other specified noninfective gastroenteritis and colitis, Other spondylosis, lumbar region, Pain in both knees, Paroxysmal atrial fibrillation ([facility]), Peripheral neuropathy, Pneumonia, unspecified " organism, Polyneuropathy, Rash and other nonspecific skin eruption, Syncope and collapse, Tachycardia, Type 1 diabetes mellitus with diabetic chronic kidney disease (HCC), Type 2 diabetes mellitus (HCC), and Unspecified fall, initial encounter.   Past surgical history:  has a past surgical history that includes Cholecystectomy; tonsillectomy and adenoidectomy; Knee surgery (Left); and Other surgical history (Left).  Personal history: family history includes Asthma in his father; Cancer in his sister; Coronary artery disease in his mother; Diabetes type II in his mother; Hypertension in his mother.  Social history:  reports that he quit smoking about 29 years ago. His smoking use included cigarettes. He has a 6.00 pack-year smoking history. He has never used smokeless tobacco. He reports previous alcohol use. He reports that he does not use drugs.    Review of Systems    Please see past medical and surgical history and rest of the system is negative    Physical Exam  Constitutional:       General: He is not in acute distress.     Appearance: Normal appearance. He is obese. He is not ill-appearing or toxic-appearing.      Comments: Patient is O2 dependent   HENT:      Head: Normocephalic and atraumatic.      Ears:      Comments: No obvious hearing loss  Abdominal:      Palpations: Abdomen is soft.   Genitourinary:     Penis: Normal.    Skin:     General: Skin is warm.      Coloration: Skin is not jaundiced.   Neurological:      General: No focal deficit present.      Mental Status: He is alert and oriented to person, place, and time.      Motor: Weakness present.      Comments: Patient is on wheelchair.  Patient is a very unsteady if he tries to walk   Psychiatric:         Mood and Affect: Mood normal.         Behavior: Behavior normal.         Thought Content: Thought content normal.         Judgment: Judgment normal.        Result Review :                 Assessment and Plan    Diagnoses and all orders for this  visit:    1. Urinary retention (Primary)  -     Urine Culture - Urine, Urine, Catheter In/Out    2. Neurogenic bladder    3. Dysuria      I went ahead and catheterized the patient we received about 450 mL of urine.  Urine is sent for culture because of dysuria and 1+ bacteria in the urine.  We will give him antibiotic if the culture is positive.  Patient is going to have urodynamic testings on 8/11/2022 and I will see him after that  Brief Urine Lab Results  (Last result in the past 365 days)      Color   Clarity   Blood   Leuk Est   Nitrite   Protein   CREAT   Urine HCG        06/15/22 1023 Yellow   Clear   Large   Negative   Negative   100 mg/dL                  Follow Up   No follow-ups on file.  Patient was given instructions and counseling regarding his condition or for health maintenance advice. Please see specific information pulled into the AVS if appropriate.     Teresita White MD

## 2022-07-07 LAB — BACTERIA SPEC AEROBE CULT: NO GROWTH

## 2022-07-13 ENCOUNTER — OFFICE VISIT (OUTPATIENT)
Dept: CARDIOLOGY | Facility: CLINIC | Age: 57
End: 2022-07-13

## 2022-07-13 VITALS
DIASTOLIC BLOOD PRESSURE: 65 MMHG | BODY MASS INDEX: 40.14 KG/M2 | OXYGEN SATURATION: 95 % | SYSTOLIC BLOOD PRESSURE: 146 MMHG | HEART RATE: 92 BPM | WEIGHT: 272 LBS

## 2022-07-13 DIAGNOSIS — I50.32 CHRONIC DIASTOLIC (CONGESTIVE) HEART FAILURE: Primary | ICD-10-CM

## 2022-07-13 DIAGNOSIS — I48.0 PAROXYSMAL ATRIAL FIBRILLATION: ICD-10-CM

## 2022-07-13 DIAGNOSIS — I10 ESSENTIAL (PRIMARY) HYPERTENSION: ICD-10-CM

## 2022-07-13 PROCEDURE — 99213 OFFICE O/P EST LOW 20 MIN: CPT | Performed by: INTERNAL MEDICINE

## 2022-07-13 RX ORDER — HYDROCODONE BITARTRATE AND ACETAMINOPHEN 5; 325 MG/1; MG/1
TABLET ORAL AS NEEDED
COMMUNITY
Start: 2022-07-07 | End: 2022-07-21

## 2022-07-14 NOTE — ASSESSMENT & PLAN NOTE
He is compliant with all the medications at this time.  Per patient's spouse, once he started doing self-catheterization he has not had any major problems with the fluid retention, shortness of breath or swelling.  We will continue Bumex at the current dose.  Labs done on 7/7/2022 at [facility] showed a creatinine of 1.4 which is near his baseline.

## 2022-07-14 NOTE — ASSESSMENT & PLAN NOTE
Currently in sinus rhythm and denies any major palpitations.  Continue Cardizem CD and metoprolol along with Eliquis for anticoagulation.

## 2022-07-14 NOTE — PROGRESS NOTES
CARDIOLOGY FOLLOW-UP PROGRESS NOTE        Chief Complaint  Congestive Heart Failure and Atrial Fibrillation    Subjective            Preston Wallis presents to [facility] CARDIOLOGY  History of Present Illness    Mr. Wallis is here for 1 month follow-up visit.  He was previously seen on 6/13/2022 when he came to establish cardiac care for chronic diastolic heart failure, atrial fibrillation with multiple hospital admissions.  No medication changes were made at that time.  Since last visit, patient was seen by urologist for neurogenic bladder.  He is currently doing self catheterizations.  Overall there is no change in his symptoms over the past 1 month.  He has not had any hospital admissions or ER visits.  Pedal edema and shortness of breath are stable.  He denies any chest pain.  Of note, he is unable to get up from wheelchair, and is currently getting physical therapy at home.  Unable to quantify any weight gain.       Past History:    chronic diastolic heart failure  paroxysmal atrial fibrillation  chronic kidney disease  COPD  Hypertension  sleep apnea  diabetes mellitus  Neurogenic bladder, currently doing intermittent self catheterizations    Medical History:  Past Medical History:   Diagnosis Date   • Age-related cognitive decline    • Allergic contact dermatitis    • Allergies    • Anemia    • Bronchiectasis with acute lower respiratory infection (HCC)    • Chest pain    • Chronic bronchitis (HCC)    • Chronic diastolic (congestive) heart failure (HCC)    • Chronic kidney disease    • Chronic respiratory failure with hypoxia (HCC)    • COPD (chronic obstructive pulmonary disease) (HCC)    • Cough    • Dependence on supplemental oxygen    • Eczema    • Erectile dysfunction     due to organic reasons   • Essential (primary) hypertension    • Fracture     closed fracture of other tarsal and metatarsal bones   • GERD without esophagitis    • High risk medication use    • Hypercholesteremia    •  Hypomagnesemia    • Insomnia    • Low back pain    • Major depressive disorder    • Major depressive disorder    • Morbid (severe) obesity due to excess calories (HCC)    • MRSA pneumonia (HCC)    • Muscle weakness    • 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 without evidence of necrosis (HCC)    • Obstructive sleep apnea (adult) (pediatric)    • Other forms of dyspnea    • Other long term (current) drug therapy    • Other specified noninfective gastroenteritis and colitis    • Other spondylosis, lumbar region    • Pain in both knees    • Paroxysmal atrial fibrillation (HCC)    • Peripheral neuropathy     attributed to type 2 diabetes   • Pneumonia, unspecified organism    • Polyneuropathy    • Rash and other nonspecific skin eruption    • Syncope and collapse    • Tachycardia    • Type 1 diabetes mellitus with diabetic chronic kidney disease (HCC)    • Type 2 diabetes mellitus (HCC)    • Unspecified fall, initial encounter        Surgical History: has a past surgical history that includes Cholecystectomy; tonsillectomy and adenoidectomy; Knee surgery (Left); and Other surgical history (Left).     Family History: family history includes Asthma in his father; Cancer in his sister; Coronary artery disease in his mother; Diabetes type II in his mother; Hypertension in his mother.     Social History: reports that he quit smoking about 29 years ago. His smoking use included cigarettes. He has a 6.00 pack-year smoking history. He has never used smokeless tobacco. He reports previous alcohol use. He reports that he does not use drugs.    Allergies: Benadryl [diphenhydramine] and Proventil [albuterol]    Current Outpatient Medications on File Prior to Visit   Medication Sig   • albuterol sulfate  (90 Base) MCG/ACT inhaler Inhale 2 puffs 4 (Four) Times a Day As Needed for Wheezing.   • apixaban (Eliquis) 5 MG tablet tablet Take 1 tablet by mouth Every 12 (Twelve) Hours.   •  aspirin 81 MG chewable tablet    • atorvastatin (LIPITOR) 20 MG tablet Take 1 tablet by mouth Every Night.   • Blood Glucose Monitoring Suppl w/Device kit 1 kit Take As Directed. Dx e11.9   • budesonide (PULMICORT) 0.5 MG/2ML nebulizer solution Take 2 mL by nebulization 2 (Two) Times a Day.   • bumetanide (BUMEX) 2 MG tablet Take 1 tablet twice daily .  Call cardiology  if weight is great then 2 pounds in one day or 5 pounds in a week   • calcium citrate (CALCITRATE) 950 (200 Ca) MG tablet Take 1 tablet by mouth Daily.   • Cholecalciferol (Vitamin D3) 50 MCG (2000 UT) tablet Take 1 tablet by mouth Daily.   • dapagliflozin (Farxiga) 5 MG tablet tablet Take 1 tablet by mouth Daily.   • dilTIAZem XR (DILACOR XR) 120 MG 24 hr capsule    • docusate sodium (COLACE) 100 MG capsule TAKE 1 CAPSULE BY MOUTH TWICE DAILY   • DULoxetine (CYMBALTA) 60 MG capsule Take 1 capsule by mouth 2 (Two) Times a Day.   • exenatide er (Bydureon BCise) 2 MG/0.85ML auto-injector injection Inject 0.85 mL under the skin into the appropriate area as directed 1 (One) Time Per Week.   • famotidine (PEPCID) 40 MG tablet Take 1 tablet by mouth Daily.   • FeroSul 325 (65 Fe) MG tablet    • ferrous gluconate (FERGON) 324 MG tablet Take 1 tablet by mouth Daily With Breakfast.   • finasteride (PROSCAR) 5 MG tablet Take 1 tablet by mouth Daily.   • folic acid (FOLVITE) 1 MG tablet Take 1 tablet by mouth Daily.   • gabapentin (NEURONTIN) 300 MG capsule Take 1 capsule by mouth every night at bedtime.   • glucose blood test strip 1 each by Other route 4 (Four) Times a Day With Meals & at Bedtime. Use as instructed dx e 11.9   • guaifenesin-dextromethorphan (MUCINEX DM)  MG tablet sustained-release 12 hour tablet Take 1 tablet by mouth 2 (Two) Times a Day As Needed (congestion).   • hydrALAZINE (APRESOLINE) 50 MG tablet Take 1 tablet by mouth 2 (Two) Times a Day.   • HYDROcodone-acetaminophen (NORCO) 5-325 MG per tablet As Needed.   • Insulin Glargine  (LANTUS SOLOSTAR) 100 UNIT/ML injection pen Inject 40 Units under the skin into the appropriate area as directed Daily.   • Insulin Lispro (ADMELOG SOLOSTAR SC) Inject  under the skin into the appropriate area as directed. Per SSI, if BS <150 = 0 units, 151-180= 1 unit, 181-210= 2units, 211-240= 3units, 241-270= 4units, 271-300= 5units, 301-330=6 untis, 331-390= 8units, <390 give 9units   • Insulin Pen Needle (B-D UF III MINI PEN NEEDLES) 31G X 5 MM misc Inject 1 each under the skin into the appropriate area as directed See Admin Instructions. with insulin   • ipratropium-albuterol (DUO-NEB) 0.5-2.5 mg/3 ml nebulizer Take 3 mL by nebulization Every 4 (Four) Hours As Needed for Wheezing or Shortness of Air. Cancel plain albuterol nebs   • Magnesium Oxide 400 (240 Mg) MG tablet    • metoprolol tartrate (LOPRESSOR) 100 MG tablet Take 1 tablet by mouth 2 (Two) Times a Day.   • montelukast (SINGULAIR) 10 MG tablet Take 1 tablet by mouth Every Night.   • NIFEdipine XL (PROCARDIA XL) 30 MG 24 hr tablet Take 30 mg by mouth Every Morning.   • O2 (OXYGEN) 2 Liter O2 - CONTINUOUS (route: Oxygen)   • salmeterol (Serevent Diskus) 50 MCG/DOSE diskus inhaler Inhale 1 puff 2 (Two) Times a Day.   • Senna S 8.6-50 MG per tablet    • tamsulosin (FLOMAX) 0.4 MG capsule 24 hr capsule Take 2 capsules by mouth Every Evening.   • traZODone (DESYREL) 100 MG tablet Take 1 tablet by mouth Every Night.   • TRUEplus Lancets 28G misc USE AS DIRECTED     No current facility-administered medications on file prior to visit.          Review of Systems   Constitutional: Positive for fatigue.   Respiratory: Positive for cough and shortness of breath. Negative for wheezing.    Cardiovascular: Positive for leg swelling. Negative for chest pain and palpitations.   Gastrointestinal: Negative for nausea and vomiting.   Neurological: Negative for dizziness and syncope.        Objective     /65   Pulse 92   Wt 123 kg (272 lb)   SpO2 95% Comment: 2  LT O2  BMI 40.14 kg/m²       Physical Exam    General : Alert, awake, no acute distress  Neck : Supple, no carotid bruit, no jugular venous distention  CVS : Regular rate and rhythm, no murmur, rubs or gallops  Lungs: Bilateral wheezing heard, no crackles  Abdomen: Soft, nontender, bowel sounds heard in all 4 quadrants  Extremities: Warm, well-perfused, 1+ edema bilaterally      Result Review :     The following data was reviewed by: Lino Ware MD on 07/13/2022:    CMP    CMP 4/10/22 4/11/22 6/22/22   Glucose 290 (A) 275 (A) 326 (A)   BUN 61 (A) 62 (A) 24 (A)   Creatinine 2.05 (A) 1.89 (A) 1.62 (A)   Sodium 132 (A) 135 (A) 136   Potassium 4.3 4.1 4.2   Chloride 94 (A) 96 (A) 93 (A)   Calcium 9.2 9.1 9.3   Albumin 3.40 (A) 3.50 3.80   Total Bilirubin 0.2 0.2 <0.2   Alkaline Phosphatase 146 (A) 141 (A) 201 (A)   AST (SGOT) 18 18 19   ALT (SGPT) 19 20 20   (A) Abnormal value            CBC    CBC 4/10/22 4/11/22 6/22/22   WBC 14.13 (A) 13.86 (A) 10.63   RBC 3.97 (A) 4.06 (A) 3.71 (A)   Hemoglobin 10.7 (A) 11.2 (A) 10.2 (A)   Hematocrit 33.7 (A) 35.0 (A) 33.2 (A)   MCV 84.9 86.2 89.5   MCH 27.0 27.6 27.5   MCHC 31.8 32.0 30.7 (A)   RDW 12.9 12.9 14.8   Platelets 416 406 379   (A) Abnormal value              Lipid Panel    Lipid Panel 7/27/21 3/17/22   Total Cholesterol 183 131   Triglycerides 173 (A) 69   HDL Cholesterol 38 (A) 65 (A)   VLDL Cholesterol 31 14   LDL Cholesterol  114 (A) 52   LDL/HDL Ratio 2.91 0.80   (A) Abnormal value                       Data reviewed: Cardiology studies        Results for orders placed during the hospital encounter of 04/05/22    Adult Transthoracic Echo Limited W/ Cont if Necessary Per Protocol    Interpretation Summary  · The aortic root measures 3.1 cm.  · Left ventricular ejection fraction appears to be 56 - 60%.  · Left ventricular diastolic function is consistent with (grade I) impaired relaxation.    There were no apparent intracardiac masses, vegetations or  thrombi.                   Assessment and Plan        Diagnoses and all orders for this visit:    1. Chronic diastolic (congestive) heart failure (HCC) (Primary)  Assessment & Plan:  He is compliant with all the medications at this time.  Per patient's spouse, once he started doing self-catheterization he has not had any major problems with the fluid retention, shortness of breath or swelling.  We will continue Bumex at the current dose.  Labs done on 7/7/2022 at [facility] showed a creatinine of 1.4 which is near his baseline.      2. Paroxysmal atrial fibrillation (HCC)  Assessment & Plan:  Currently in sinus rhythm and denies any major palpitations.  Continue Cardizem CD and metoprolol along with Eliquis for anticoagulation.      3. Essential (primary) hypertension  Assessment & Plan:  Blood pressure is reasonably well controlled.  Continue current regimen for now.  Of note, he is both on Cardizem CD and nifedipine.  One of them may be trimmed down during follow-up visits.  Continue hydralazine and metoprolol without changes.              Follow Up     Return in about 3 months (around 10/13/2022) for Next scheduled follow up.    Patient was given instructions and counseling regarding his condition or for health maintenance advice. Please see specific information pulled into the AVS if appropriate.

## 2022-07-14 NOTE — ASSESSMENT & PLAN NOTE
Blood pressure is reasonably well controlled.  Continue current regimen for now.  Of note, he is both on Cardizem CD and nifedipine.  One of them may be trimmed down during follow-up visits.  Continue hydralazine and metoprolol without changes.

## 2022-07-20 ENCOUNTER — PATIENT OUTREACH (OUTPATIENT)
Dept: CASE MANAGEMENT | Facility: OTHER | Age: 57
End: 2022-07-20

## 2022-07-20 ENCOUNTER — TELEPHONE (OUTPATIENT)
Dept: FAMILY MEDICINE CLINIC | Age: 57
End: 2022-07-20

## 2022-07-20 DIAGNOSIS — Z12.11 SCREEN FOR COLON CANCER: Primary | ICD-10-CM

## 2022-07-21 ENCOUNTER — TRANSCRIBE ORDERS (OUTPATIENT)
Dept: ADMINISTRATIVE | Facility: HOSPITAL | Age: 57
End: 2022-07-21

## 2022-07-21 DIAGNOSIS — I87.2 VENOUS INSUFFICIENCY (CHRONIC) (PERIPHERAL): Primary | ICD-10-CM

## 2022-07-21 DIAGNOSIS — N18.31 CHRONIC KIDNEY DISEASE (CKD) STAGE G3A/A1, MODERATELY DECREASED GLOMERULAR FILTRATION RATE (GFR) BETWEEN 45-59 ML/MIN/1.73 SQUARE METER AND ALBUMINURIA CREATININE RATIO LESS THAN 30 MG/G (CMS/H*: Primary | ICD-10-CM

## 2022-07-21 RX ORDER — HEPARIN SODIUM (PORCINE) LOCK FLUSH IV SOLN 100 UNIT/ML 100 UNIT/ML
500 SOLUTION INTRAVENOUS
Qty: 5 ML | Refills: 12 | OUTPATIENT
Start: 2022-07-21

## 2022-07-21 RX ORDER — 0.9 % SODIUM CHLORIDE 0.9 %
10 VIAL (ML) INJECTION
Qty: 10 ML | Refills: 12 | OUTPATIENT
Start: 2022-08-03

## 2022-07-21 NOTE — OUTREACH NOTE
AMBULATORY CASE MANAGEMENT NOTE    Name and Relationship of Patient/Support Person: Kami Wallis G - Emergency Contact    Gómez brought in medications for me to fill.  Filled 3 weeks in planners.  Kami reports things are going well, taking medications as prescribed.  Orders placed for port flushes and scheduled for August.  Will mail letter.    Also will check with Kita to see if she has any lab orders that need to be done in advance.      Care gaps need to be addressed, scheduled physical exam in Fall.  Mailed all information to patient. Called and discussed.  Called nephrology for future lab order.     Education Documentation  No documentation found.        ALISSON GOMEZ  Ambulatory Case Management    7/21/2022, 13:15 EDT

## 2022-07-22 NOTE — TELEPHONE ENCOUNTER
Insurance was able to approve 1 visit for next week,then will try to recert him afterward.  Gómez notified.

## 2022-07-23 DIAGNOSIS — Z79.4 TYPE 2 DIABETES MELLITUS WITH HYPERGLYCEMIA, WITH LONG-TERM CURRENT USE OF INSULIN: ICD-10-CM

## 2022-07-23 DIAGNOSIS — E11.65 TYPE 2 DIABETES MELLITUS WITH HYPERGLYCEMIA, WITH LONG-TERM CURRENT USE OF INSULIN: ICD-10-CM

## 2022-07-25 RX ORDER — EXENATIDE 2 MG/.85ML
INJECTION, SUSPENSION, EXTENDED RELEASE SUBCUTANEOUS
Qty: 3.4 ML | Refills: 5 | Status: SHIPPED | OUTPATIENT
Start: 2022-07-25 | End: 2022-12-27

## 2022-07-29 ENCOUNTER — PATIENT OUTREACH (OUTPATIENT)
Dept: CASE MANAGEMENT | Facility: OTHER | Age: 57
End: 2022-07-29

## 2022-07-29 DIAGNOSIS — I50.32 CHRONIC DIASTOLIC (CONGESTIVE) HEART FAILURE: ICD-10-CM

## 2022-07-29 DIAGNOSIS — I10 ESSENTIAL (PRIMARY) HYPERTENSION: ICD-10-CM

## 2022-07-29 DIAGNOSIS — I87.2 VENOUS INSUFFICIENCY (CHRONIC) (PERIPHERAL): Primary | ICD-10-CM

## 2022-07-29 PROCEDURE — 99439 CHRNC CARE MGMT STAF EA ADDL: CPT | Performed by: FAMILY MEDICINE

## 2022-07-29 PROCEDURE — 99490 CHRNC CARE MGMT STAFF 1ST 20: CPT | Performed by: FAMILY MEDICINE

## 2022-07-29 NOTE — OUTREACH NOTE
AMBULATORY CASE MANAGEMENT NOTE    Name and Relationship of Patient/Support Person: Fanny Farmer - [facility]    Fanny Farmer called to report that [facility] did not deliver supplies.  She called and they state that they didn't service or insurance not me.  Noone was informed, however his appointment at [facility] for port flush was not cancelled.  Refaxed information to [employer].        ALISSON GOMEZ  Ambulatory Case Management    7/29/2022, 14:25 EDT     CCM End of Month Documentation    This Chronic Medical Management Care Plan for Preston Wallis, 57 y.o. male, has been monitored and managed; reviewed and a new plan of care implemented for the month of July.  A cumulative time of 70  minutes was spent on this patient record this month, including phone call with care giver; electronic communication with other providers; electronic communication with primary care provider; electronic communication with pharmacist; chart review; phone call with pharmacist.    Regarding the patient's problems: has Type 1 diabetes mellitus with diabetic chronic kidney disease ([facility]); Polyneuropathy; Peripheral neuropathy; Paroxysmal atrial fibrillation ([facility]); Obstructive sleep apnea (adult) (pediatric); MRSA pneumonia ([facility]); Low back pain; Insomnia; Essential (primary) hypertension; Chronic diastolic (congestive) heart failure ([facility]); Allergies; COPD (chronic obstructive pulmonary disease) ([facility]); Chronic anticoagulation; Benign prostatic hyperplasia; Doyle catheter in place; Microscopic hematuria; Impaired mobility and endurance; Stage 3b chronic kidney disease (CKD) ([facility]); Iron deficiency anemia secondary to inadequate dietary iron intake; Vitamin D deficiency due to chronic kidney disease; Class 3 severe obesity with serious comorbidity in adult ([facility]); Lower extremity edema; Elevated alkaline phosphatase level; and Venous insufficiency (chronic) (peripheral) on their problem list., the following items were addressed: medications;  transitions to medical care; medical records; referrals to community service providers and any changes can be found within the plan section of the note.  A detailed listing of time spent for chronic care management is tracked within each outreach encounter.  Current medications include:  has a current medication list which includes the following prescription(s): albuterol sulfate hfa, apixaban, atorvastatin, budesonide, bumetanide, bydureon bcise, calcium citrate, vitamin d3, farxiga, docusate sodium, duloxetine, famotidine, ferrous gluconate, finasteride, folic acid, gabapentin, glucose blood, guaifenesin-dextromethorphan, heparin, hydralazine, insulin glargine, insulin lispro, b-d uf iii mini pen needles, ipratropium-albuterol, metoprolol tartrate, montelukast, nifedipine xl, o2, serevent diskus, [START ON 8/3/2022] sodium chloride, tamsulosin, trazodone, and trueplus lancets 28g. and the patient is reported to be caregiver will take responsibility for med compliance,  Medications are reported to be effective.  Regarding these diagnoses, referrals were made to the following provider(s):  specialists.  All notes on chart for PCP to review.    The patient was monitored remotely for pain; medications.    The patient's physical needs include:  help taking medications as prescribed; medication education; needs assistance with ADLs; physical healthcare; resources for disability needs; physician referral.     The patient's mental support needs include:  continued support    The patient's cognitive support needs include:  medication; continued support; needs assistance with ADLs    The patient's psychosocial support needs include:  continued support; coordination of community providers; medication management or adherence    The patient's functional needs include: health care coverage; medication education; needs assistance for ADLs; physical healthcare; physician referral; resources for disability needs    The patient's  environmental needs include:  resources for disability needs    Care Plan overall comments:  Gómez has been stable for 6 weeks.  Still not at goal, however improving.    Refer to previous outreach notes for more information on the areas listed above.    Monthly Billing Diagnoses  (I87.2) Venous insufficiency (chronic) (peripheral)    (I50.32) Chronic diastolic (congestive) heart failure (HCC)    (I10) Essential (primary) hypertension    Medications   · Medications have been reconciled    Care Plan progress this month:      Recently Modified Care Plans Updates made since 6/28/2022 12:00 AM    No recently modified care plans.          Instructions   · Patient was provided an electronic copy of care plan  · CCM services were explained and offered and patient has accepted these services.  · Patient has given their written consent to receive CCM services and understands that this includes the authorization of electronic communication of medical information with the other treating providers.  · Patient understands that they may stop CCM services at any time and these changes will be effective at the end of the calendar month and will effectively revocate the agreement of CCM services.  · Patient understands that only one practitioner can furnish and be paid for CCM services during one calendar month.  Patient also understands that there may be co-payment or deductible fees in association with CCM services.  · Patient will continue with at least monthly follow-up calls with the Nurse Navigator.    ALISSON GOMEZ  Ambulatory Case Management    7/29/2022, 14:25 EDT

## 2022-08-04 DIAGNOSIS — G89.29 CHRONIC BILATERAL LOW BACK PAIN, UNSPECIFIED WHETHER SCIATICA PRESENT: ICD-10-CM

## 2022-08-04 DIAGNOSIS — M54.50 CHRONIC BILATERAL LOW BACK PAIN, UNSPECIFIED WHETHER SCIATICA PRESENT: ICD-10-CM

## 2022-08-04 RX ORDER — GABAPENTIN 300 MG/1
300 CAPSULE ORAL
Qty: 90 CAPSULE | Refills: 1 | Status: SHIPPED | OUTPATIENT
Start: 2022-08-04 | End: 2022-09-01

## 2022-08-04 NOTE — TELEPHONE ENCOUNTER
Not due until September, however I am filling his medication planner 3 weeks in advance.  I am not sure the pharmacy CAN fill early, but would like to have it ready.  Add 1 refill.      Gabapentin last filled 6/9/22  #90    tox 7-

## 2022-08-09 ENCOUNTER — PATIENT OUTREACH (OUTPATIENT)
Dept: CASE MANAGEMENT | Facility: OTHER | Age: 57
End: 2022-08-09

## 2022-08-09 ENCOUNTER — TELEMEDICINE (OUTPATIENT)
Dept: FAMILY MEDICINE CLINIC | Age: 57
End: 2022-08-09

## 2022-08-09 DIAGNOSIS — I48.0 PAROXYSMAL ATRIAL FIBRILLATION: ICD-10-CM

## 2022-08-09 DIAGNOSIS — N18.30 TYPE 1 DIABETES MELLITUS WITH STAGE 3 CHRONIC KIDNEY DISEASE, UNSPECIFIED WHETHER STAGE 3A OR 3B CKD: Primary | ICD-10-CM

## 2022-08-09 DIAGNOSIS — I87.2 VENOUS INSUFFICIENCY (CHRONIC) (PERIPHERAL): Primary | ICD-10-CM
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
## 2022-08-09 DIAGNOSIS — M54.50 CHRONIC BILATERAL LOW BACK PAIN, UNSPECIFIED WHETHER SCIATICA PRESENT: ICD-10-CM

## 2022-08-09 DIAGNOSIS — N18.31 STAGE 3A CHRONIC KIDNEY DISEASE: ICD-10-CM

## 2022-08-09 DIAGNOSIS — I10 ESSENTIAL (PRIMARY) HYPERTENSION: ICD-10-CM

## 2022-08-09 DIAGNOSIS — G62.9 PERIPHERAL POLYNEUROPATHY: ICD-10-CM

## 2022-08-09 DIAGNOSIS — G89.29 CHRONIC BILATERAL LOW BACK PAIN, UNSPECIFIED WHETHER SCIATICA PRESENT: ICD-10-CM

## 2022-08-09 DIAGNOSIS — M25.562 PAIN IN LATERAL PORTION OF LEFT KNEE: ICD-10-CM

## 2022-08-09 DIAGNOSIS — E10.22 TYPE 1 DIABETES MELLITUS WITH STAGE 3 CHRONIC KIDNEY DISEASE, UNSPECIFIED WHETHER STAGE 3A OR 3B CKD: Primary | ICD-10-CM

## 2022-08-09 DIAGNOSIS — D50.9 IRON DEFICIENCY ANEMIA, UNSPECIFIED IRON DEFICIENCY ANEMIA TYPE: ICD-10-CM

## 2022-08-09 PROCEDURE — 99214 OFFICE O/P EST MOD 30 MIN: CPT | Performed by: FAMILY MEDICINE

## 2022-08-09 NOTE — PROGRESS NOTES
Preston Wallis presents to [facility].    Chief Complaint: routine follow up, copd follow up    Subjective   {Problem List  Visit Diagnosis   Encounters  Notes  Medications  Labs  Result Review Imaging  Media :23}     History of Present Illness:  HPI   I am seeing Gómez for his chronic obstructive pulmonary disease exacerbation with recent hospitalization for acute on chronic hypoxic/hypercapnic respiratory failure and headache tell people that acute on chronic heart failure with diastolic dysfunction.  He also has underlying hypertension, diabetes, GERD, hypercholesterolemia, sleep apnea, stage 3 renal failure as well as recurrent pneumonia and h/o MRSA and pseudomonas pneumonia.  He also had underlying chronic atrial fibrillation  He is stable and has no CP or SOA or heart palpitations.  He is on Eliquis 5 mg twice daily, Bumex, metoprolol daily and tolerating meds well. He had diabetes and his BS are running < 180 in am and 300s at night.  He denies low BS or dizzy spells.  He is eating well and taking his meds as directed.  He has a nurse care manager who helps him with his medications and since she has started working with him his health has been improved.    Review of Systems:  Review of Systems   Constitutional: Negative for chills, fatigue and fever.   HENT: Negative for congestion, ear discharge and sore throat.    Respiratory: Negative for shortness of breath.    Cardiovascular: Negative for chest pain.   Gastrointestinal: Negative for abdominal pain, constipation, diarrhea, nausea, vomiting and GERD.   Genitourinary: Negative for flank pain.   Neurological: Negative for dizziness and headache.   Psychiatric/Behavioral: Negative for depressed mood.        Objective   Medical History:  Past Medical History:   • Age-related cognitive decline   • Allergic contact dermatitis   • Allergies   • Anemia   • Bronchiectasis with acute lower respiratory infection (HCC)   • Chest  pain   • Chronic bronchitis ([facility])   • Chronic diastolic (congestive) heart failure ([facility])   • Chronic kidney disease   • Chronic respiratory failure with hypoxia ([facility])   • COPD (chronic obstructive pulmonary disease) ([facility])   • Cough   • Dependence on supplemental oxygen   • Eczema   • Erectile dysfunction    due to organic reasons   • Essential (primary) hypertension   • Fracture    closed fracture of other tarsal and metatarsal bones   • GERD without esophagitis   • High risk medication use   • Hypercholesteremia   • Hypomagnesemia   • Insomnia   • Low back pain   • Major depressive disorder   • Major depressive disorder   • Morbid (severe) obesity due to excess calories ([facility])   • MRSA pneumonia ([facility])   • Muscle weakness   • 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 without evidence of necrosis ([facility])   • Obstructive sleep apnea (adult) (pediatric)   • Other forms of dyspnea   • Other long term (current) drug therapy   • Other specified noninfective gastroenteritis and colitis   • Other spondylosis, lumbar region   • Pain in both knees   • Paroxysmal atrial fibrillation ([facility])   • Peripheral neuropathy    attributed to type 2 diabetes   • Pneumonia, unspecified organism   • Polyneuropathy   • Rash and other nonspecific skin eruption   • Syncope and collapse   • Tachycardia   • Type 1 diabetes mellitus with diabetic chronic kidney disease ([facility])   • Type 2 diabetes mellitus ([facility])   • Unspecified fall, initial encounter     Past Surgical History:   • CHOLECYSTECTOMY   • KNEE SURGERY   • OTHER SURGICAL HISTORY    venous port   • TONSILLECTOMY AND ADENOIDECTOMY      Family History   Problem Relation Age of Onset   • Coronary artery disease Mother    • Hypertension Mother    • Diabetes type II Mother    • Asthma Father    • Cancer Sister      Social History     Tobacco Use   • Smoking status: Former Smoker     Packs/day: 1.00     Years: 6.00     Pack years: 6.00     Types: Cigarettes      Quit date:      Years since quittin.6   • Smokeless tobacco: Never Used   Substance Use Topics   • Alcohol use: Not Currently       Health Maintenance Due   Topic Date Due   • COLORECTAL CANCER SCREENING  Never done   • COVID-19 Vaccine (1) Never done   • ANNUAL PHYSICAL  Never done   • Hepatitis B (1 of 3 - Risk 3-dose series) Never done   • Pneumococcal Vaccine 0-64 (2 - PCV) 1998   • ZOSTER VACCINE (1 of 2) Never done   • DIABETIC FOOT EXAM  Never done   • DIABETIC EYE EXAM  2021   • URINE MICROALBUMIN  2022        Immunization History   Administered Date(s) Administered   • Flu Vaccine Quad PF >36MO 10/18/2016, 10/16/2017, 2019   • Flu Vaccine Split Quad 2019   • Influenza Quad Vaccine (Inpatient) 2013   • Influenza, Unspecified 2020   • Pneumococcal Polysaccharide (PPSV23) 1997   • Tdap 2018       Allergies   Allergen Reactions   • Benadryl [Diphenhydramine] Itching   • Proventil [Albuterol] Other (See Comments)     Mouth sores          Medications:  Current Outpatient Medications on File Prior to Visit   Medication Sig   • albuterol sulfate  (90 Base) MCG/ACT inhaler Inhale 2 puffs 4 (Four) Times a Day As Needed for Wheezing.   • apixaban (Eliquis) 5 MG tablet tablet Take 1 tablet by mouth Every 12 (Twelve) Hours.   • atorvastatin (LIPITOR) 20 MG tablet Take 1 tablet by mouth Every Night.   • budesonide (PULMICORT) 0.5 MG/2ML nebulizer solution Take 2 mL by nebulization 2 (Two) Times a Day.   • bumetanide (BUMEX) 2 MG tablet Take 1 tablet twice daily .  Call cardiology  if weight is great then 2 pounds in one day or 5 pounds in a week   • Bydureon BCise 2 MG/0.85ML auto-injector injection inject 2mg (one pen) UNDER THE SKIN into THE appropriate AREA AS DIRECTED one time PER WEEK   • calcium citrate (CALCITRATE) 950 (200 Ca) MG tablet Take 1 tablet by mouth Daily.   • Cholecalciferol (Vitamin D3) 50 MCG (2000 UT) tablet Take 1 tablet by  mouth Daily.   • dapagliflozin (Farxiga) 5 MG tablet tablet Take 1 tablet by mouth Daily.   • docusate sodium (COLACE) 100 MG capsule TAKE 1 CAPSULE BY MOUTH TWICE DAILY   • DULoxetine (CYMBALTA) 60 MG capsule Take 1 capsule by mouth 2 (Two) Times a Day.   • famotidine (PEPCID) 40 MG tablet Take 1 tablet by mouth Daily.   • ferrous gluconate (FERGON) 324 MG tablet Take 1 tablet by mouth Daily With Breakfast.   • finasteride (PROSCAR) 5 MG tablet Take 1 tablet by mouth Daily.   • folic acid (FOLVITE) 1 MG tablet Take 1 tablet by mouth Daily.   • gabapentin (NEURONTIN) 300 MG capsule Take 1 capsule by mouth every night at bedtime.   • glucose blood test strip 1 each by Other route 4 (Four) Times a Day With Meals & at Bedtime. Use as instructed dx e 11.9   • guaifenesin-dextromethorphan (MUCINEX DM)  MG tablet sustained-release 12 hour tablet Take 1 tablet by mouth 2 (Two) Times a Day As Needed (congestion).   • heparin 100 UNIT/ML solution injection 5 mL by Intracatheter route Every 30 (Thirty) Days.   • hydrALAZINE (APRESOLINE) 50 MG tablet Take 1 tablet by mouth 2 (Two) Times a Day.   • Insulin Glargine (LANTUS SOLOSTAR) 100 UNIT/ML injection pen Inject 40 Units under the skin into the appropriate area as directed Daily.   • Insulin Lispro (ADMELOG SOLOSTAR SC) Inject  under the skin into the appropriate area as directed. Per SSI, if BS <150 = 0 units, 151-180= 1 unit, 181-210= 2units, 211-240= 3units, 241-270= 4units, 271-300= 5units, 301-330=6 untis, 331-390= 8units, <390 give 9units   • Insulin Pen Needle (B-D UF III MINI PEN NEEDLES) 31G X 5 MM misc Inject 1 each under the skin into the appropriate area as directed See Admin Instructions. with insulin   • ipratropium-albuterol (DUO-NEB) 0.5-2.5 mg/3 ml nebulizer Take 3 mL by nebulization Every 4 (Four) Hours As Needed for Wheezing or Shortness of Air. Cancel plain albuterol nebs   • metoprolol tartrate (LOPRESSOR) 100 MG tablet Take 1 tablet by mouth 2  (Two) Times a Day.   • montelukast (SINGULAIR) 10 MG tablet Take 1 tablet by mouth Every Night.   • NIFEdipine XL (PROCARDIA XL) 30 MG 24 hr tablet Take 30 mg by mouth Every Morning.   • O2 (OXYGEN) 2 Liter O2 - CONTINUOUS (route: Oxygen)   • salmeterol (Serevent Diskus) 50 MCG/DOSE diskus inhaler Inhale 1 puff 2 (Two) Times a Day.   • sodium chloride 0.9 % injection Infuse 10 mL into a venous catheter Every 30 (Thirty) Days. Standing order.  Port flush q 30 days - 5mL Heparin   • tamsulosin (FLOMAX) 0.4 MG capsule 24 hr capsule Take 2 capsules by mouth Every Evening.   • traZODone (DESYREL) 100 MG tablet Take 1 tablet by mouth Every Night.   • TRUEplus Lancets 28G misc USE AS DIRECTED     No current facility-administered medications on file prior to visit.       Vital Signs:   There were no vitals taken for this visit.      Physical Exam:  Physical Exam  Vitals and nursing note reviewed.   Constitutional:       General: He is not in acute distress.     Appearance: Normal appearance. He is not ill-appearing, toxic-appearing or diaphoretic.   HENT:      Head: Normocephalic and atraumatic.      Mouth/Throat:      Pharynx: No posterior oropharyngeal erythema.   Pulmonary:      Effort: Pulmonary effort is normal.      Breath sounds: Normal breath sounds.   Musculoskeletal:      Left knee: Swelling present.   Skin:     General: Skin is warm and dry.   Neurological:      Mental Status: He is alert and oriented to person, place, and time.   Psychiatric:         Mood and Affect: Mood normal.         Behavior: Behavior normal.         Result Review      The following data was reviewed by Kimmy Riley MD on 08/09/2022.  Lab Results   Component Value Date    WBC 10.63 06/22/2022    HGB 10.2 (L) 06/22/2022    HCT 33.2 (L) 06/22/2022    MCV 89.5 06/22/2022     06/22/2022     Lab Results   Component Value Date    GLUCOSE 326 (H) 06/22/2022    BUN 24 (H) 06/22/2022    CREATININE 1.62 (H) 06/22/2022    EGFRIFNONA 46  (L) 07/27/2021    BCR 14.8 06/22/2022    K 4.2 06/22/2022    CO2 31.1 (H) 06/22/2022    CALCIUM 9.3 06/22/2022    ALBUMIN 3.80 06/22/2022    LABIL2 0.9 (L) 09/30/2020    AST 19 06/22/2022    ALT 20 06/22/2022     Lab Results   Component Value Date    CHOL 131 03/17/2022    CHLPL 165 08/26/2020    TRIG 69 03/17/2022    HDL 65 (H) 03/17/2022    LDL 52 03/17/2022     Lab Results   Component Value Date    TSH 0.580 03/17/2020     Lab Results   Component Value Date    HGBA1C 8.30 (H) 06/22/2022     Lab Results   Component Value Date    PSA 0.725 03/17/2022                       Assessment and Plan:          Diagnoses and all orders for this visit:    1. Type 1 diabetes mellitus with stage 3 chronic kidney disease, unspecified whether stage 3a or 3b CKD (HCC) (Primary)  Comments:  Not at goal, I will increase lantus to 38 units.  continue SSI     2. Peripheral polyneuropathy  Comments:  Stable on current dose of gabapentin.  No changes needed in current medications or treatment plan    3. Chronic bilateral low back pain, unspecified whether sciatica present    4. Iron deficiency anemia, unspecified iron deficiency anemia type  Comments:  Chronic but stable with hemoglobin 10.2    5. Essential (primary) hypertension  Comments:  Per patient has blood pressures have been all normal and he gets them checked routinely with home health. No changes needed in current medications or treatment     6. Paroxysmal atrial fibrillation (HCC)  Comments:  Stable and well-controlled.  Continue beta-blocker metoprolol and Eliquis as well as Coreg.    7. Stage 3a chronic kidney disease (HCC)  Comments:  Creatinine 1.62, GFR 46    8. Fall, initial encounter  Comments:  L knee gave out and he went home.     9. Pain in lateral portion of left knee  Comments:  He is to come in and get an x-ray of the left knee.  Orders:  -     XR Knee 1 or 2 View Left; Future          Follow Up   Return in about 3 months (around 11/9/2022).

## 2022-08-10 DIAGNOSIS — D50.9 IRON DEFICIENCY ANEMIA, UNSPECIFIED IRON DEFICIENCY ANEMIA TYPE: Primary | ICD-10-CM

## 2022-08-10 DIAGNOSIS — I50.32 CHRONIC DIASTOLIC (CONGESTIVE) HEART FAILURE: ICD-10-CM

## 2022-08-10 NOTE — OUTREACH NOTE
AMBULATORY CASE MANAGEMENT NOTE    Name and Relationship of Patient/Support Person:  -     Elizbaeth and Gómez stopped by with medication. Filled planner for 2 weeks only.    Discussed that pending lab orders waiting for nephrology.  Seeing Dr. Hanson on 8/12/22 and urology tomorrow.    Spoke with pharmacy regarding refills.  Insurance only given home health very few visits, cannot go out today for lab.    Dr. Hanson August 11, 2022 10am  Nephrology Aug 22, 2022 at 12pm Akash Castellon  Mailed AVS to patient with appts.       ALISSON GOMEZ  Ambulatory Case Management    8/9/2022, 15:23 EDT

## 2022-08-11 ENCOUNTER — HOSPITAL ENCOUNTER (OUTPATIENT)
Dept: GENERAL RADIOLOGY | Facility: HOSPITAL | Age: 57
Discharge: HOME | End: 2022-08-11

## 2022-08-11 ENCOUNTER — TELEPHONE (OUTPATIENT)
Dept: FAMILY MEDICINE CLINIC | Age: 57
End: 2022-08-11

## 2022-08-11 ENCOUNTER — PROCEDURE VISIT (OUTPATIENT)
Dept: UROLOGY | Facility: CLINIC | Age: 57
End: 2022-08-11

## 2022-08-11 ENCOUNTER — LAB (OUTPATIENT)
Dept: LAB | Facility: HOSPITAL | Age: 57
End: 2022-08-11

## 2022-08-11 DIAGNOSIS — D50.9 IRON DEFICIENCY ANEMIA, UNSPECIFIED IRON DEFICIENCY ANEMIA TYPE: ICD-10-CM

## 2022-08-11 DIAGNOSIS — I50.32 CHRONIC DIASTOLIC (CONGESTIVE) HEART FAILURE: ICD-10-CM

## 2022-08-11 DIAGNOSIS — D53.9 NUTRITIONAL ANEMIA, UNSPECIFIED: Primary | ICD-10-CM

## 2022-08-11 DIAGNOSIS — M25.562 PAIN IN LATERAL PORTION OF LEFT KNEE: ICD-10-CM

## 2022-08-11 DIAGNOSIS — R33.9 URINARY RETENTION: Primary | ICD-10-CM

## 2022-08-11 LAB
IRON 24H UR-MRATE: 49 MCG/DL (ref 59–158)
IRON SATN MFR SERPL: 15 % (ref 20–50)
NT-PROBNP SERPL-MCNC: 240 PG/ML (ref 0–900)
TIBC SERPL-MCNC: 325 MCG/DL (ref 298–536)
TRANSFERRIN SERPL-MCNC: 218 MG/DL (ref 200–360)

## 2022-08-11 PROCEDURE — 51741 ELECTRO-UROFLOWMETRY FIRST: CPT | Performed by: UROLOGY

## 2022-08-11 PROCEDURE — 73560 X-RAY EXAM OF KNEE 1 OR 2: CPT

## 2022-08-11 PROCEDURE — 51729 CYSTOMETROGRAM W/VP&UP: CPT | Performed by: UROLOGY

## 2022-08-11 PROCEDURE — 84466 ASSAY OF TRANSFERRIN: CPT

## 2022-08-11 PROCEDURE — 83880 ASSAY OF NATRIURETIC PEPTIDE: CPT

## 2022-08-11 PROCEDURE — 83540 ASSAY OF IRON: CPT

## 2022-08-11 PROCEDURE — 36415 COLL VENOUS BLD VENIPUNCTURE: CPT

## 2022-08-11 PROCEDURE — 51784 ANAL/URINARY MUSCLE STUDY: CPT | Performed by: UROLOGY

## 2022-08-11 PROCEDURE — 51797 INTRAABDOMINAL PRESSURE TEST: CPT | Performed by: UROLOGY

## 2022-08-11 NOTE — TELEPHONE ENCOUNTER
Incoming call from [facility], they are needing a passport referral for patient to be referred to Dr. Poncho Engel's Office.                  DX: D53.9- Nutritional Anemia    Passport referral created & faxed to Toro's Office- [phone].     shawna

## 2022-08-11 NOTE — PROGRESS NOTES
Primary Care Provider  Kimmy Riley MD     Referring Provider  No ref. provider found     Chief Complaint  COPD    Subjective          History of Presenting Illness  Patient is a 57-year-old male, patient of Dr. Jackson who presents for management of bronchiectasis and has a history of recurrent Pseudomonas pneumonia who presents for follow-up visit today. Patient's wife is present with the patient in the office today.  Of note, patient has not followed up since December 2020.  Patient states that he does get short of breath that is worse with exertion and extreme weather changes, moderate in severity, and improved with rest.  Patient also has intermittent coughing and wheezing.  Patient states he is currently not taking any maintenance inhalers.  Patient states he only has albuterol inhaler and DuoNeb nebulizer treatments that he takes as needed.  Patient is on oxygen 2 L/min via nasal cannula continuously.  Patient is a former cigarette smoker reports that he quit smoking in 1993.  Patient states that he is wearing his BiPAP machine at night and with naps which helps him to sleep.  Patient denies any morning headaches or excessive daytime sleepiness. Patient denies fever, chills, night sweats, swollen glands in the head and neck, unintentional weight loss, hemoptysis, purulent sputum production, dysphagia, chest pain, palpitations, chest tightness, abdominal pain, nausea, vomiting, and diarrhea.  Patient also denies any myalgias, changes in sense of taste and/or smell, sore throat, any other coronavirus or flu-like symptoms.  Patient denies any leg swelling, orthopnea, paroxysmal nocturnal dyspnea.  Patient is able to perform activities of daily living.        Review of Systems   Constitutional: Negative for activity change, appetite change, chills, diaphoresis, fatigue, fever, unexpected weight gain and unexpected weight loss.        Negative for Insomnia   HENT: Negative for congestion (Nasal), mouth  sores, nosebleeds, postnasal drip, sore throat, swollen glands and trouble swallowing.         Negative for Thrush  Negative for Hoarseness  Negative for Allergies/Hay Fever  Negative for Recent Head injury  Negative for Ear Fullness  Negative for Nasal or Sinus pain  Negative for Dry lips  Negative for Nasal discharge   Respiratory: Positive for cough, shortness of breath and wheezing. Negative for apnea and chest tightness.         Negative for Hemoptysis  Negative for Pleuritic pain   Cardiovascular: Negative for chest pain, palpitations and leg swelling.        Negative for Claudication  Negative for Cyanosis  Negative for Dyspnea on exertion   Gastrointestinal: Negative for abdominal pain, diarrhea, nausea, vomiting and GERD.   Musculoskeletal: Negative for joint swelling and myalgias.        Negative for Joint pain  Negative for Joint stiffness   Skin: Negative for color change, dry skin, pallor and rash.   Neurological: Negative for syncope, weakness and headache.   Hematological: Negative for adenopathy. Does not bruise/bleed easily.        Family History   Problem Relation Age of Onset   • Coronary artery disease Mother    • Hypertension Mother    • Diabetes type II Mother    • Asthma Father    • Cancer Sister         Social History     Socioeconomic History   • Marital status:    Tobacco Use   • Smoking status: Former Smoker     Packs/day: 1.00     Years: 12.00     Pack years: 12.00     Types: Cigarettes     Start date:      Quit date:      Years since quittin.6   • Smokeless tobacco: Never Used   Vaping Use   • Vaping Use: Never used   Substance and Sexual Activity   • Alcohol use: Not Currently   • Drug use: Never   • Sexual activity: Defer        Past Medical History:   Diagnosis Date   • Age-related cognitive decline    • Allergic contact dermatitis    • Allergies    • Anemia    • Bronchiectasis with acute lower respiratory infection (HCC)    • Chest pain    • Chronic bronchitis  ([facility])    • Chronic diastolic (congestive) heart failure ([facility])    • Chronic kidney disease    • Chronic respiratory failure with hypoxia ([facility])    • COPD (chronic obstructive pulmonary disease) ([facility])    • Cough    • Dependence on supplemental oxygen    • Eczema    • Erectile dysfunction     due to organic reasons   • Essential (primary) hypertension    • Fracture     closed fracture of other tarsal and metatarsal bones   • GERD without esophagitis    • High risk medication use    • Hypercholesteremia    • Hypomagnesemia    • Insomnia    • Low back pain    • Major depressive disorder    • Major depressive disorder    • Morbid (severe) obesity due to excess calories ([facility])    • MRSA pneumonia ([facility])    • Muscle weakness    • 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 without evidence of necrosis ([facility])    • Obstructive sleep apnea (adult) (pediatric)    • Other forms of dyspnea    • Other long term (current) drug therapy    • Other specified noninfective gastroenteritis and colitis    • Other spondylosis, lumbar region    • Pain in both knees    • Paroxysmal atrial fibrillation ([facility])    • Peripheral neuropathy     attributed to type 2 diabetes   • Pneumonia, unspecified organism    • Polyneuropathy    • Rash and other nonspecific skin eruption    • Syncope and collapse    • Tachycardia    • Type 1 diabetes mellitus with diabetic chronic kidney disease ([facility])    • Type 2 diabetes mellitus ([facility])    • Unspecified fall, initial encounter    • Urinary retention         Immunization History   Administered Date(s) Administered   • Flu Vaccine Quad PF >36MO 10/18/2016, 10/16/2017, 11/04/2019   • Flu Vaccine Split Quad 11/04/2019   • Influenza Quad Vaccine (Inpatient) 09/19/2013   • Influenza, Unspecified 09/21/2020   • Pneumococcal Polysaccharide (PPSV23) 11/20/1997   • Tdap 09/18/2018       Allergies   Allergen Reactions   • Benadryl [Diphenhydramine] Itching   • Proventil [Albuterol] Other  (See Comments)     Mouth sores            Current Outpatient Medications:   •  albuterol sulfate  (90 Base) MCG/ACT inhaler, Inhale 2 puffs 4 (Four) Times a Day As Needed for Wheezing., Disp: 18 g, Rfl: 11  •  apixaban (Eliquis) 5 MG tablet tablet, Take 1 tablet by mouth Every 12 (Twelve) Hours., Disp: 60 tablet, Rfl: 5  •  atorvastatin (LIPITOR) 20 MG tablet, Take 1 tablet by mouth Every Night., Disp: 90 tablet, Rfl: 1  •  bumetanide (BUMEX) 2 MG tablet, Take 1 tablet twice daily .  Call cardiology  if weight is great then 2 pounds in one day or 5 pounds in a week, Disp: 180 tablet, Rfl: 0  •  Bydureon BCise 2 MG/0.85ML auto-injector injection, inject 2mg (one pen) UNDER THE SKIN into THE appropriate AREA AS DIRECTED one time PER WEEK, Disp: 3.4 mL, Rfl: 5  •  calcium citrate (CALCITRATE) 950 (200 Ca) MG tablet, Take 1 tablet by mouth Daily., Disp: 90 tablet, Rfl: 3  •  Cholecalciferol (Vitamin D3) 50 MCG (2000 UT) tablet, Take 1 tablet by mouth Daily., Disp: 90 tablet, Rfl: 1  •  dapagliflozin (Farxiga) 5 MG tablet tablet, Take 1 tablet by mouth Daily., Disp: 90 tablet, Rfl: 1  •  docusate sodium (COLACE) 100 MG capsule, TAKE 1 CAPSULE BY MOUTH TWICE DAILY, Disp: 180 capsule, Rfl: 1  •  DULoxetine (CYMBALTA) 60 MG capsule, Take 1 capsule by mouth 2 (Two) Times a Day., Disp: 180 capsule, Rfl: 1  •  famotidine (PEPCID) 40 MG tablet, Take 1 tablet by mouth Daily., Disp: 90 tablet, Rfl: 1  •  ferrous gluconate (FERGON) 324 MG tablet, Take 1 tablet by mouth Daily With Breakfast., Disp: 90 tablet, Rfl: 1  •  finasteride (PROSCAR) 5 MG tablet, Take 1 tablet by mouth Daily., Disp: 90 tablet, Rfl: 0  •  folic acid (FOLVITE) 1 MG tablet, Take 1 tablet by mouth Daily., Disp: 90 tablet, Rfl: 1  •  gabapentin (NEURONTIN) 300 MG capsule, Take 1 capsule by mouth every night at bedtime., Disp: 90 capsule, Rfl: 1  •  glucose blood test strip, 1 each by Other route 4 (Four) Times a Day With Meals & at Bedtime. Use as  instructed dx e 11.9, Disp: 100 each, Rfl: 4  •  guaifenesin-dextromethorphan (MUCINEX DM)  MG tablet sustained-release 12 hour tablet, Take 1 tablet by mouth 2 (Two) Times a Day As Needed (congestion)., Disp: 60 tablet, Rfl: 3  •  heparin 100 UNIT/ML solution injection, 5 mL by Intracatheter route Every 30 (Thirty) Days., Disp: 5 mL, Rfl: 12  •  hydrALAZINE (APRESOLINE) 50 MG tablet, Take 1 tablet by mouth 2 (Two) Times a Day., Disp: 180 tablet, Rfl: 1  •  Insulin Lispro (ADMELOG SOLOSTAR SC), Inject  under the skin into the appropriate area as directed. Per SSI, if BS <150 = 0 units, 151-180= 1 unit, 181-210= 2units, 211-240= 3units, 241-270= 4units, 271-300= 5units, 301-330=6 untis, 331-390= 8units, <390 give 9units, Disp: , Rfl:   •  Insulin Pen Needle (B-D UF III MINI PEN NEEDLES) 31G X 5 MM misc, Inject 1 each under the skin into the appropriate area as directed See Admin Instructions. with insulin, Disp: 100 each, Rfl: 3  •  ipratropium-albuterol (DUO-NEB) 0.5-2.5 mg/3 ml nebulizer, Take 3 mL by nebulization Every 4 (Four) Hours As Needed for Wheezing or Shortness of Air for up to 30 days. Cancel plain albuterol nebs, Disp: 360 mL, Rfl: 11  •  Lantus SoloStar 100 UNIT/ML injection pen, INJECT 40 UNITS UNDER THE SKIN ONCE DAILY, Disp: 15 mL, Rfl: 1  •  metoprolol tartrate (LOPRESSOR) 100 MG tablet, Take 1 tablet by mouth 2 (Two) Times a Day., Disp: 180 tablet, Rfl: 1  •  montelukast (SINGULAIR) 10 MG tablet, Take 1 tablet by mouth Every Night., Disp: 90 tablet, Rfl: 1  •  NIFEdipine XL (PROCARDIA XL) 30 MG 24 hr tablet, Take 30 mg by mouth Every Morning., Disp: , Rfl:   •  O2 (OXYGEN), 2 Liter O2 - CONTINUOUS (route: Oxygen), Disp: , Rfl:   •  sodium chloride 0.9 % injection, Infuse 10 mL into a venous catheter Every 30 (Thirty) Days. Standing order.  Port flush q 30 days - 5mL Heparin, Disp: 10 mL, Rfl: 12  •  tamsulosin (FLOMAX) 0.4 MG capsule 24 hr capsule, Take 2 capsules by mouth Every Evening.,  "Disp: 180 capsule, Rfl: 1  •  traZODone (DESYREL) 100 MG tablet, Take 1 tablet by mouth Every Night., Disp: 90 tablet, Rfl: 1  •  TRUEplus Lancets 28G misc, USE AS DIRECTED, Disp: 100 each, Rfl: 0  •  Fluticasone-Umeclidin-Vilant (Trelegy Ellipta) 200-62.5-25 MCG/INH inhaler, Inhale 1 puff Daily for 30 days. Rinse mouth out after each use, Disp: 1 each, Rfl: 11     Objective     Physical Exam  Vital Signs:   WDWN, Alert, NAD.    HEENT:  PERRL, EOMI.  OP, nares clear, no sinus tenderness  Neck:  Supple, no JVD, no thyromegaly.  Lymph: no axillary, cervical, supraclavicular lymphadenopathy noted bilaterally  Chest: Mildly decreased breath sounds throughout. No wheezes, rales, or rhonchi appreciated.  Normal work of breathing noted.  Patient is able speak full sentences without difficulty.  Patient is on 2 L of oxygen per minute via nasal cannula.  CV: RRR, no MGR, pulses 2+, equal.  Abd:  Soft, NT, ND, + BS, no HSM  EXT:  no clubbing, no cyanosis, no edema, no joint tenderness  Neuro:  A&Ox3, CN grossly intact, no focal deficits.  Skin: No rashes or lesions noted.    /50 (BP Location: Left arm, Patient Position: Sitting)   Pulse 72   Temp 97.8 °F (36.6 °C)   Resp 18   Ht 175.3 cm (69.02\")   Wt 123 kg (271 lb)   SpO2 92% Comment: 2 L/min nasal cannula  BMI 40.00 kg/m²         Result Review :   I have reviewed my last telehealth visit note.    Procedures:         Assessment and Plan      Assessment:  1.  Very severe COPD with an FEV1 of 21% in 2008, noncompliant with inhalers.  2.  Bronchiectasis.  3.  Recurrent Pseudomonas pneumonia and bronchopneumonia with multidrug-resistant Pseudomonas, no longer on MOIZ nebs.   4.  Chronic dyspnea.  5.  Chronic cough.  6.  Chronic wheezing.  7.  GERD.  8.  History of PE in July 2019 on Xarelto without cor pulmonale.  9.  Obstructive sleep apnea, BiPAP  10.  Tobacco abuse of cigarettes in remission.      Plan:  1.  Will start patient on Trelegy Ellipta inhaler 200 mcg " 1 puff once daily.  Patient is advised rinse his mouth out after each use.  Medication compliance discussed with patient in the office today.  Risks of not taking medications as prescribed discussed with the patient.  Patient verbalized understanding and compliance. Patient is advised to take all medications as prescribed.  2.  Continue albuterol inhaler and DuoNeb nebulizer treatments as needed.  3.  Continue BiPAP at current settings at night and with naps oxygen bled in and clean mask and tubing daily.  Will request a copy of BiPAP compliance report and notify patient if any changes need to be made.  4.  Continue oxygen to keep SPO2 at 89% and above.  5.  Will order a CBC, IgE level, and an updated pulmonary function test.  Patient refuses 6-minute walk test stating he is not able to walk that well.  6.  For  GERD,  patient to continue PPI.   Patient is also advised to sleep with the head of the bed elevated and do not eat 3-4 hours prior to bedtime.  7.  Vaccination status:  patient reports they are up-to-date with flu vaccine.  Patient declines pneumonia and COVID-19 vaccinations.  Discussed with patient the benefits of vaccination including decreased risk of severe illness, hospitalization and death related to flu, pneumonia, and COVID-19.  Patient verbalized understanding and will consider getting vaccinated.  Patient is advised to continue to follow CDC recommendations such as social distancing, wearing a mask, and washing hands for least 20 seconds.  8.  Smoking status: Patient is a former cigarette smoker.  9.  Patient to call the office, 911, or go to the ER with new or worsening symptoms.  10.  Follow-up in 4 months, sooner if needed.              Follow Up   Return for 4 months in [place] with Dr. Carolina.  Patient was given instructions and counseling regarding his condition or for health maintenance advice. Please see specific information pulled into the AVS if appropriate.

## 2022-08-16 ENCOUNTER — OFFICE VISIT (OUTPATIENT)
Dept: UROLOGY | Facility: CLINIC | Age: 57
End: 2022-08-16

## 2022-08-16 VITALS
HEIGHT: 69 IN | WEIGHT: 272 LBS | SYSTOLIC BLOOD PRESSURE: 124 MMHG | DIASTOLIC BLOOD PRESSURE: 55 MMHG | BODY MASS INDEX: 40.29 KG/M2 | HEART RATE: 76 BPM | TEMPERATURE: 97.8 F

## 2022-08-16 DIAGNOSIS — R33.9 URINARY RETENTION: Primary | ICD-10-CM

## 2022-08-16 DIAGNOSIS — N31.9 NEUROGENIC BLADDER: ICD-10-CM

## 2022-08-16 DIAGNOSIS — N31.2 NEUROGENIC BLADDER, FLACCID: ICD-10-CM

## 2022-08-16 DIAGNOSIS — E10.8 DIABETES MELLITUS TYPE 1 WITH COMPLICATIONS: ICD-10-CM

## 2022-08-16 LAB
BILIRUB BLD-MCNC: NEGATIVE MG/DL
CLARITY, POC: CLEAR
COLOR UR: YELLOW
EXPIRATION DATE: ABNORMAL
GLUCOSE UR STRIP-MCNC: ABNORMAL MG/DL
KETONES UR QL: NEGATIVE
LEUKOCYTE EST, POC: NEGATIVE
Lab: ABNORMAL
NITRITE UR-MCNC: NEGATIVE MG/ML
PH UR: 5 [PH] (ref 5–8)
PROT UR STRIP-MCNC: ABNORMAL MG/DL
RBC # UR STRIP: NEGATIVE /UL
SP GR UR: 1.01 (ref 1–1.03)
UROBILINOGEN UR QL: NORMAL

## 2022-08-16 PROCEDURE — 99212 OFFICE O/P EST SF 10 MIN: CPT | Performed by: UROLOGY

## 2022-08-16 NOTE — PROGRESS NOTES
"Chief Complaint  Urinary Retention (Here for urodynamic results)    Diabetes mellitus    Neurogenic bladder    Subjective          Preston Wallis presents to [facility]  History of Present Illness    Patient had urodynamics testing because of neurogenic bladder.  Urodynamics checked and patient has no detrusor activity.  Patient is diabetic and obviously is he has motor paralytic bladder.    Objective No acute distress  Vital Signs:   /55   Pulse 76   Temp 97.8 °F (36.6 °C) (Infrared)   Ht 175.3 cm (69.02\")   Wt 123 kg (272 lb)   BMI 40.14 kg/m²     Allergies   Allergen Reactions   • Benadryl [Diphenhydramine] Itching   • Proventil [Albuterol] Other (See Comments)     Mouth sores        Past medical history:  has a past medical history of Age-related cognitive decline, Allergic contact dermatitis, Allergies, Anemia, Bronchiectasis with acute lower respiratory infection ([facility]), Chest pain, Chronic bronchitis ([facility]), Chronic diastolic (congestive) heart failure ([facility]), Chronic kidney disease, Chronic respiratory failure with hypoxia ([facility]), COPD (chronic obstructive pulmonary disease) ([facility]), Cough, Dependence on supplemental oxygen, Eczema, Erectile dysfunction, Essential (primary) hypertension, Fracture, GERD without esophagitis, High risk medication use, Hypercholesteremia, Hypomagnesemia, Insomnia, Low back pain, Major depressive disorder, Major depressive disorder, Morbid (severe) obesity due to excess calories ([facility]), MRSA pneumonia ([facility]), Muscle weakness, 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 without evidence of necrosis ([facility]), Obstructive sleep apnea (adult) (pediatric), Other forms of dyspnea, Other long term (current) drug therapy, Other specified noninfective gastroenteritis and colitis, Other spondylosis, lumbar region, Pain in both knees, Paroxysmal atrial fibrillation ([facility]), Peripheral neuropathy, Pneumonia, unspecified organism, " Polyneuropathy, Rash and other nonspecific skin eruption, Syncope and collapse, Tachycardia, Type 1 diabetes mellitus with diabetic chronic kidney disease (HCC), Type 2 diabetes mellitus (HCC), Unspecified fall, initial encounter, and Urinary retention.   Past surgical history:  has a past surgical history that includes Cholecystectomy; tonsillectomy and adenoidectomy; Knee surgery (Left); Other surgical history (Left); and Cystoscopy.  Personal history: family history includes Asthma in his father; Cancer in his sister; Coronary artery disease in his mother; Diabetes type II in his mother; Hypertension in his mother.  Social history:  reports that he quit smoking about 29 years ago. His smoking use included cigarettes. He has a 6.00 pack-year smoking history. He has never used smokeless tobacco. He reports previous alcohol use. He reports that he does not use drugs.    Review of Systems    No change from before    Physical Exam  Constitutional:       General: He is not in acute distress.     Appearance: Normal appearance. He is obese. He is not ill-appearing or toxic-appearing.      Comments: Patient is on a wheelchair   HENT:      Head: Normocephalic and atraumatic.      Ears:      Comments: No hearing loss  Pulmonary:      Effort: Pulmonary effort is normal.   Skin:     General: Skin is warm.      Coloration: Skin is not jaundiced.   Neurological:      General: No focal deficit present.      Mental Status: He is alert and oriented to person, place, and time.      Motor: Weakness present.      Comments: Cannot walk because of diabetic neuropathy   Psychiatric:         Mood and Affect: Mood normal.         Behavior: Behavior normal.         Thought Content: Thought content normal.         Judgment: Judgment normal.        Result Review :                 Assessment and Plan    Diagnoses and all orders for this visit:    1. Urinary retention (Primary)  -     POC Urinalysis Dipstick, Automated    2. Diabetes mellitus  type 1 with complications (HCC)    3. Neurogenic bladder    4. Neurogenic bladder, flaccid      Will continue intermittent catheterization and thankfully the wife is taking care of of his son who is quadriplegic and she knows how to catheterize the patient.  I will recheck him in 6 months time to make sure is not getting breakthrough infections.  Brief Urine Lab Results  (Last result in the past 365 days)      Color   Clarity   Blood   Leuk Est   Nitrite   Protein   CREAT   Urine HCG        08/16/22 1118 Yellow   Clear   Negative   Negative   Negative   100 mg/dL                  Follow Up   No follow-ups on file.  Patient was given instructions and counseling regarding his condition or for health maintenance advice. Please see specific information pulled into the AVS if appropriate.     Teresita White MD

## 2022-08-17 ENCOUNTER — OUTSIDE FACILITY SERVICE (OUTPATIENT)
Dept: FAMILY MEDICINE CLINIC | Age: 57
End: 2022-08-17

## 2022-08-17 DIAGNOSIS — M25.562 ACUTE PAIN OF LEFT KNEE: Primary | ICD-10-CM

## 2022-08-17 PROCEDURE — OUTSIDEPOS PR OUTSIDE POS PLACEHOLDER: Performed by: FAMILY MEDICINE

## 2022-08-18 ENCOUNTER — PATIENT OUTREACH (OUTPATIENT)
Dept: CASE MANAGEMENT | Facility: OTHER | Age: 57
End: 2022-08-18

## 2022-08-18 DIAGNOSIS — Z79.4 UNCONTROLLED TYPE 2 DIABETES MELLITUS WITH HYPERGLYCEMIA, WITH LONG-TERM CURRENT USE OF INSULIN: Primary | ICD-10-CM

## 2022-08-18 DIAGNOSIS — E11.65 UNCONTROLLED TYPE 2 DIABETES MELLITUS WITH HYPERGLYCEMIA, WITH LONG-TERM CURRENT USE OF INSULIN: Primary | ICD-10-CM

## 2022-08-18 RX ORDER — INSULIN GLARGINE 100 [IU]/ML
INJECTION, SOLUTION SUBCUTANEOUS
Qty: 15 ML | Refills: 1 | Status: SHIPPED | OUTPATIENT
Start: 2022-08-18 | End: 2022-11-21

## 2022-08-18 NOTE — OUTREACH NOTE
AMBULATORY CASE MANAGEMENT NOTE    Name and Relationship of Patient/Support Person: Dr. Riley - Provider    Consulted with Dr. Riley and ortho office regarding Gómez's x-ray of knee.  Referral placed for evaluation.  Called Gómez to discuss and he was agreeable.    Reviewed urology note.    Called nephrology to see if they received labs, states no. Resent to nephrology office      ALISSON GOMEZ  Ambulatory Case Management    8/17/2022, 09:09 EDT

## 2022-08-22 ENCOUNTER — LAB (OUTPATIENT)
Dept: LAB | Facility: HOSPITAL | Age: 57
End: 2022-08-22

## 2022-08-22 ENCOUNTER — TRANSCRIBE ORDERS (OUTPATIENT)
Dept: ADMINISTRATIVE | Facility: HOSPITAL | Age: 57
End: 2022-08-22

## 2022-08-22 DIAGNOSIS — R80.1 PERSISTENT PROTEINURIA: Primary | ICD-10-CM

## 2022-08-22 DIAGNOSIS — R80.1 PERSISTENT PROTEINURIA: ICD-10-CM

## 2022-08-22 DIAGNOSIS — N18.31 CHRONIC KIDNEY DISEASE (CKD) STAGE G3A/A1, MODERATELY DECREASED GLOMERULAR FILTRATION RATE (GFR) BETWEEN 45-59 ML/MIN/1.73 SQUARE METER AND ALBUMINURIA CREATININE RATIO LESS THAN 30 MG/G (CMS/H*: ICD-10-CM

## 2022-08-22 LAB
ANION GAP SERPL CALCULATED.3IONS-SCNC: 11.4 MMOL/L (ref 5–15)
BASOPHILS # BLD AUTO: 0.04 10*3/MM3 (ref 0–0.2)
BASOPHILS NFR BLD AUTO: 0.4 % (ref 0–1.5)
BUN SERPL-MCNC: 31 MG/DL (ref 6–20)
BUN/CREAT SERPL: 19 (ref 7–25)
C3 SERPL-MCNC: 165 MG/DL (ref 82–167)
C4 SERPL-MCNC: 37 MG/DL (ref 14–44)
CALCIUM SPEC-SCNC: 8.9 MG/DL (ref 8.6–10.5)
CHLORIDE SERPL-SCNC: 92 MMOL/L (ref 98–107)
CO2 SERPL-SCNC: 33.6 MMOL/L (ref 22–29)
CREAT SERPL-MCNC: 1.63 MG/DL (ref 0.76–1.27)
DEPRECATED RDW RBC AUTO: 43 FL (ref 37–54)
EGFRCR SERPLBLD CKD-EPI 2021: 48.8 ML/MIN/1.73
EOSINOPHIL # BLD AUTO: 0.31 10*3/MM3 (ref 0–0.4)
EOSINOPHIL NFR BLD AUTO: 2.7 % (ref 0.3–6.2)
ERYTHROCYTE [DISTWIDTH] IN BLOOD BY AUTOMATED COUNT: 13.1 % (ref 12.3–15.4)
GLUCOSE SERPL-MCNC: 358 MG/DL (ref 65–99)
HCT VFR BLD AUTO: 34.1 % (ref 37.5–51)
HGB BLD-MCNC: 10.4 G/DL (ref 13–17.7)
IMM GRANULOCYTES # BLD AUTO: 0.04 10*3/MM3 (ref 0–0.05)
IMM GRANULOCYTES NFR BLD AUTO: 0.4 % (ref 0–0.5)
LYMPHOCYTES # BLD AUTO: 2.37 10*3/MM3 (ref 0.7–3.1)
LYMPHOCYTES NFR BLD AUTO: 20.9 % (ref 19.6–45.3)
MCH RBC QN AUTO: 27.2 PG (ref 26.6–33)
MCHC RBC AUTO-ENTMCNC: 30.5 G/DL (ref 31.5–35.7)
MCV RBC AUTO: 89 FL (ref 79–97)
MONOCYTES # BLD AUTO: 0.84 10*3/MM3 (ref 0.1–0.9)
MONOCYTES NFR BLD AUTO: 7.4 % (ref 5–12)
NEUTROPHILS NFR BLD AUTO: 68.2 % (ref 42.7–76)
NEUTROPHILS NFR BLD AUTO: 7.75 10*3/MM3 (ref 1.7–7)
PHOSPHATE SERPL-MCNC: 4.5 MG/DL (ref 2.5–4.5)
PLATELET # BLD AUTO: 337 10*3/MM3 (ref 140–450)
PMV BLD AUTO: 8.6 FL (ref 6–12)
POTASSIUM SERPL-SCNC: 4.3 MMOL/L (ref 3.5–5.2)
PTH-INTACT SERPL-MCNC: 69.3 PG/ML (ref 15–65)
RBC # BLD AUTO: 3.83 10*6/MM3 (ref 4.14–5.8)
SODIUM SERPL-SCNC: 137 MMOL/L (ref 136–145)
WBC NRBC COR # BLD: 11.35 10*3/MM3 (ref 3.4–10.8)

## 2022-08-22 PROCEDURE — 86235 NUCLEAR ANTIGEN ANTIBODY: CPT

## 2022-08-22 PROCEDURE — 84100 ASSAY OF PHOSPHORUS: CPT

## 2022-08-22 PROCEDURE — 86160 COMPLEMENT ANTIGEN: CPT

## 2022-08-22 PROCEDURE — 83516 IMMUNOASSAY NONANTIBODY: CPT

## 2022-08-22 PROCEDURE — 82784 ASSAY IGA/IGD/IGG/IGM EACH: CPT

## 2022-08-22 PROCEDURE — 86037 ANCA TITER EACH ANTIBODY: CPT

## 2022-08-22 PROCEDURE — 85025 COMPLETE CBC W/AUTO DIFF WBC: CPT

## 2022-08-22 PROCEDURE — 84155 ASSAY OF PROTEIN SERUM: CPT

## 2022-08-22 PROCEDURE — 80048 BASIC METABOLIC PNL TOTAL CA: CPT

## 2022-08-22 PROCEDURE — 86225 DNA ANTIBODY NATIVE: CPT

## 2022-08-22 PROCEDURE — 83970 ASSAY OF PARATHORMONE: CPT

## 2022-08-22 PROCEDURE — 36415 COLL VENOUS BLD VENIPUNCTURE: CPT

## 2022-08-22 PROCEDURE — 84165 PROTEIN E-PHORESIS SERUM: CPT

## 2022-08-22 PROCEDURE — 86334 IMMUNOFIX E-PHORESIS SERUM: CPT

## 2022-08-23 ENCOUNTER — LAB (OUTPATIENT)
Dept: LAB | Facility: HOSPITAL | Age: 57
End: 2022-08-23

## 2022-08-23 DIAGNOSIS — N18.31 CHRONIC KIDNEY DISEASE (CKD) STAGE G3A/A1, MODERATELY DECREASED GLOMERULAR FILTRATION RATE (GFR) BETWEEN 45-59 ML/MIN/1.73 SQUARE METER AND ALBUMINURIA CREATININE RATIO LESS THAN 30 MG/G (CMS/H*: ICD-10-CM

## 2022-08-23 LAB
BACTERIA UR QL AUTO: ABNORMAL /HPF
BILIRUB UR QL STRIP: NEGATIVE
CLARITY UR: CLEAR
COLOR UR: YELLOW
GLUCOSE UR STRIP-MCNC: ABNORMAL MG/DL
HGB UR QL STRIP.AUTO: NEGATIVE
KETONES UR QL STRIP: NEGATIVE
LEUKOCYTE ESTERASE UR QL STRIP.AUTO: NEGATIVE
NITRITE UR QL STRIP: NEGATIVE
PH UR STRIP.AUTO: 6.5 [PH] (ref 5–8)
PROT UR QL STRIP: ABNORMAL
RBC # UR STRIP: ABNORMAL /HPF
REF LAB TEST METHOD: ABNORMAL
SP GR UR STRIP: 1.02 (ref 1–1.03)
SQUAMOUS #/AREA URNS HPF: ABNORMAL /HPF
UROBILINOGEN UR QL STRIP: ABNORMAL
WBC # UR STRIP: ABNORMAL /HPF

## 2022-08-23 PROCEDURE — 81001 URINALYSIS AUTO W/SCOPE: CPT

## 2022-08-23 PROCEDURE — 87086 URINE CULTURE/COLONY COUNT: CPT | Performed by: UROLOGY

## 2022-08-24 LAB
ALBUMIN SERPL ELPH-MCNC: 3 G/DL (ref 2.9–4.4)
ALBUMIN/GLOB SERPL: 0.8 {RATIO} (ref 0.7–1.7)
ALPHA1 GLOB SERPL ELPH-MCNC: 0.3 G/DL (ref 0–0.4)
ALPHA2 GLOB SERPL ELPH-MCNC: 1.4 G/DL (ref 0.4–1)
B-GLOBULIN SERPL ELPH-MCNC: 1.1 G/DL (ref 0.7–1.3)
BACTERIA SPEC AEROBE CULT: NO GROWTH
CENTROMERE B AB SER-ACNC: <0.2 AI (ref 0–0.9)
CHROMATIN AB SERPL-ACNC: <0.2 AI (ref 0–0.9)
DSDNA AB SER-ACNC: <1 IU/ML (ref 0–9)
ENA JO1 AB SER-ACNC: <0.2 AI (ref 0–0.9)
ENA RNP AB SER-ACNC: <0.2 AI (ref 0–0.9)
ENA SCL70 AB SER-ACNC: <0.2 AI (ref 0–0.9)
ENA SM AB SER-ACNC: <0.2 AI (ref 0–0.9)
ENA SS-A AB SER-ACNC: <0.2 AI (ref 0–0.9)
ENA SS-B AB SER-ACNC: <0.2 AI (ref 0–0.9)
GAMMA GLOB SERPL ELPH-MCNC: 1.2 G/DL (ref 0.4–1.8)
GLOBULIN SER-MCNC: 4.1 G/DL (ref 2.2–3.9)
IGA SERPL-MCNC: 430 MG/DL (ref 90–386)
IGG SERPL-MCNC: 1177 MG/DL (ref 603–1613)
IGM SERPL-MCNC: 70 MG/DL (ref 20–172)
INTERPRETATION SERPL IEP-IMP: ABNORMAL
LABORATORY COMMENT REPORT: ABNORMAL
Lab: NORMAL
M PROTEIN SERPL ELPH-MCNC: ABNORMAL G/DL
PROT SERPL-MCNC: 7.1 G/DL (ref 6–8.5)

## 2022-08-25 ENCOUNTER — TELEPHONE (OUTPATIENT)
Dept: PULMONOLOGY | Facility: CLINIC | Age: 57
End: 2022-08-25

## 2022-08-25 ENCOUNTER — OFFICE VISIT (OUTPATIENT)
Dept: PULMONOLOGY | Facility: CLINIC | Age: 57
End: 2022-08-25

## 2022-08-25 VITALS
WEIGHT: 271 LBS | OXYGEN SATURATION: 92 % | RESPIRATION RATE: 18 BRPM | HEART RATE: 72 BPM | TEMPERATURE: 97.8 F | BODY MASS INDEX: 40.14 KG/M2 | HEIGHT: 69 IN | DIASTOLIC BLOOD PRESSURE: 50 MMHG | SYSTOLIC BLOOD PRESSURE: 114 MMHG

## 2022-08-25 DIAGNOSIS — Z87.01 HISTORY OF PSEUDOMONAS PNEUMONIA: ICD-10-CM

## 2022-08-25 DIAGNOSIS — R06.09 CHRONIC DYSPNEA: ICD-10-CM

## 2022-08-25 DIAGNOSIS — J44.9 CHRONIC OBSTRUCTIVE PULMONARY DISEASE, UNSPECIFIED COPD TYPE: Primary | ICD-10-CM

## 2022-08-25 DIAGNOSIS — F17.201 TOBACCO ABUSE, IN REMISSION: ICD-10-CM

## 2022-08-25 DIAGNOSIS — J47.9 BRONCHIECTASIS WITHOUT COMPLICATION: ICD-10-CM

## 2022-08-25 DIAGNOSIS — R06.2 WHEEZING: ICD-10-CM

## 2022-08-25 DIAGNOSIS — R05.3 CHRONIC COUGH: ICD-10-CM

## 2022-08-25 DIAGNOSIS — K21.9 GASTROESOPHAGEAL REFLUX DISEASE, UNSPECIFIED WHETHER ESOPHAGITIS PRESENT: ICD-10-CM

## 2022-08-25 DIAGNOSIS — G47.33 OSA (OBSTRUCTIVE SLEEP APNEA): ICD-10-CM

## 2022-08-25 LAB
C-ANCA TITR SER IF: NORMAL TITER
MYELOPEROXIDASE AB SER IA-ACNC: <0.2 UNITS (ref 0–0.9)
P-ANCA ATYPICAL TITR SER IF: NORMAL TITER
P-ANCA TITR SER IF: NORMAL TITER
PROTEINASE3 AB SER IA-ACNC: <0.2 UNITS (ref 0–0.9)

## 2022-08-25 PROCEDURE — 99214 OFFICE O/P EST MOD 30 MIN: CPT | Performed by: NURSE PRACTITIONER

## 2022-08-25 RX ORDER — IPRATROPIUM BROMIDE AND ALBUTEROL SULFATE 2.5; .5 MG/3ML; MG/3ML
3 SOLUTION RESPIRATORY (INHALATION) EVERY 4 HOURS PRN
Qty: 360 ML | Refills: 11 | Status: SHIPPED | OUTPATIENT
Start: 2022-08-25 | End: 2022-09-26

## 2022-08-25 RX ORDER — ALBUTEROL SULFATE 90 UG/1
2 AEROSOL, METERED RESPIRATORY (INHALATION) 4 TIMES DAILY PRN
Qty: 18 G | Refills: 11 | Status: SHIPPED | OUTPATIENT
Start: 2022-08-25 | End: 2023-02-09 | Stop reason: SDUPTHER

## 2022-08-26 ENCOUNTER — PATIENT OUTREACH (OUTPATIENT)
Dept: CASE MANAGEMENT | Facility: OTHER | Age: 57
End: 2022-08-26

## 2022-08-26 DIAGNOSIS — I50.32 CHRONIC DIASTOLIC (CONGESTIVE) HEART FAILURE: ICD-10-CM

## 2022-08-26 DIAGNOSIS — Z79.4 UNCONTROLLED TYPE 2 DIABETES MELLITUS WITH HYPERGLYCEMIA, WITH LONG-TERM CURRENT USE OF INSULIN: Primary | ICD-10-CM

## 2022-08-26 DIAGNOSIS — E11.65 UNCONTROLLED TYPE 2 DIABETES MELLITUS WITH HYPERGLYCEMIA, WITH LONG-TERM CURRENT USE OF INSULIN: Primary | ICD-10-CM

## 2022-08-26 DIAGNOSIS — I87.2 VENOUS INSUFFICIENCY (CHRONIC) (PERIPHERAL): ICD-10-CM

## 2022-08-26 PROCEDURE — 99490 CHRNC CARE MGMT STAFF 1ST 20: CPT | Performed by: FAMILY MEDICINE

## 2022-08-26 PROCEDURE — 99439 CHRNC CARE MGMT STAF EA ADDL: CPT | Performed by: FAMILY MEDICINE

## 2022-08-26 NOTE — OUTREACH NOTE
AMBULATORY CASE MANAGEMENT NOTE    Name and Relationship of Patient/Support Person:  -     Gómez and chaim came in on the 8/22/22 for me to fill up medication planner.  Filled for 3 weeks duration.    Reviewed labs ordered by nephrology and pulmonology - surprised to see the glucose level and amount of glucose in urine still with medication compliance improved.      ALISSON R  Ambulatory Case Management    8/26/2022, 13:47 EDT     CCM End of Month Documentation    This Chronic Medical Management Care Plan for Preston Wallis, 57 y.o. male, has been monitored and managed; reviewed and a new plan of care implemented for the month of August.  A cumulative time of 75  minutes was spent on this patient record this month, including phone call with care giver; electronic communication with other providers; electronic communication with primary care provider; electronic communication with pharmacist; chart review; phone call with pharmacist.    Regarding the patient's problems: has Chronic cough; Type 1 diabetes mellitus with diabetic chronic kidney disease ([facility]); Polyneuropathy; Peripheral neuropathy; Paroxysmal atrial fibrillation ([facility]); MILADY (obstructive sleep apnea); MRSA pneumonia ([facility]); Low back pain; Insomnia; Essential (primary) hypertension; Chronic diastolic (congestive) heart failure ([facility]); Allergies; COPD (chronic obstructive pulmonary disease) ([facility]); Chronic anticoagulation; Benign prostatic hyperplasia; Doyle catheter in place; Microscopic hematuria; Impaired mobility and endurance; Stage 3b chronic kidney disease (CKD) ([facility]); Iron deficiency anemia secondary to inadequate dietary iron intake; Vitamin D deficiency due to chronic kidney disease; Class 3 severe obesity with serious comorbidity in adult ([facility]); Lower extremity edema; Elevated alkaline phosphatase level; Venous insufficiency (chronic) (peripheral); Tobacco abuse, in remission; History of Pseudomonas pneumonia; Chronic dyspnea; Gastroesophageal reflux  disease; Bronchiectasis without complication (HCC); and Wheezing on their problem list., the following items were addressed: medications; transitions to medical care; medical records; referrals to community service providers and any changes can be found within the plan section of the note.  A detailed listing of time spent for chronic care management is tracked within each outreach encounter.  Current medications include:  has a current medication list which includes the following prescription(s): albuterol sulfate hfa, apixaban, atorvastatin, bumetanide, bydureon bcise, calcium citrate, vitamin d3, farxiga, docusate sodium, duloxetine, famotidine, ferrous gluconate, finasteride, trelegy ellipta, folic acid, gabapentin, glucose blood, guaifenesin-dextromethorphan, heparin, hydralazine, insulin lispro, b-d uf iii mini pen needles, ipratropium-albuterol, lantus solostar, metoprolol tartrate, montelukast, nifedipine xl, o2, sodium chloride, tamsulosin, trazodone, and trueplus lancets 28g. and the patient is reported to be caregiver will take responsibility for med compliance,  Medications are reported to be non-effective in controlling symptoms and changes have been made to the medication protocol.  Regarding these diagnoses, referrals were made to the following provider(s):  specialists.  All notes on chart for PCP to review.    The patient was monitored remotely for pain; medications.    The patient's physical needs include:  help taking medications as prescribed; medication education; needs assistance with ADLs; physical healthcare; resources for disability needs; physician referral.     The patient's mental support needs include:  continued support    The patient's cognitive support needs include:  medication; continued support; needs assistance with ADLs    The patient's psychosocial support needs include:  continued support; coordination of community providers; medication management or adherence    The patient's  functional needs include: health care coverage; medication education; needs assistance for ADLs; physical healthcare; physician referral; resources for disability needs    The patient's environmental needs include:  resources for disability needs    Care Plan overall comments:  Still not at goal, however improving. will continue to follow.    Refer to previous outreach notes for more information on the areas listed above.    Monthly Billing Diagnoses  (E11.65,  Z79.4) Uncontrolled type 2 diabetes mellitus with hyperglycemia, with long-term current use of insulin (HCC)    (I87.2) Venous insufficiency (chronic) (peripheral)    (I50.32) Chronic diastolic (congestive) heart failure (HCC)    Medications   · Medications have been reconciled    Care Plan progress this month:      Recently Modified Care Plans Updates made since 7/26/2022 12:00 AM    No recently modified care plans.            Instructions   · Patient was provided an electronic copy of care plan  · CCM services were explained and offered and patient has accepted these services.  · Patient has given their written consent to receive CCM services and understands that this includes the authorization of electronic communication of medical information with the other treating providers.  · Patient understands that they may stop CCM services at any time and these changes will be effective at the end of the calendar month and will effectively revocate the agreement of CCM services.  · Patient understands that only one practitioner can furnish and be paid for CCM services during one calendar month.  Patient also understands that there may be co-payment or deductible fees in association with CCM services.  · Patient will continue with at least monthly follow-up calls with the Nurse Navigator.    ALISSON GOMEZ  Ambulatory Case Management    8/26/2022, 13:47 EDT

## 2022-09-01 ENCOUNTER — OFFICE VISIT (OUTPATIENT)
Dept: FAMILY MEDICINE CLINIC | Age: 57
End: 2022-09-01

## 2022-09-01 ENCOUNTER — PATIENT OUTREACH (OUTPATIENT)
Dept: CASE MANAGEMENT | Facility: OTHER | Age: 57
End: 2022-09-01

## 2022-09-01 VITALS
OXYGEN SATURATION: 94 % | BODY MASS INDEX: 40.14 KG/M2 | HEIGHT: 69 IN | WEIGHT: 271 LBS | DIASTOLIC BLOOD PRESSURE: 74 MMHG | HEART RATE: 80 BPM | SYSTOLIC BLOOD PRESSURE: 127 MMHG

## 2022-09-01 DIAGNOSIS — M17.12 PRIMARY OSTEOARTHRITIS OF LEFT KNEE: ICD-10-CM

## 2022-09-01 DIAGNOSIS — I10 ESSENTIAL (PRIMARY) HYPERTENSION: ICD-10-CM

## 2022-09-01 DIAGNOSIS — Z79.899 HIGH RISK MEDICATION USE: ICD-10-CM

## 2022-09-01 DIAGNOSIS — N31.9 NEUROGENIC BLADDER: ICD-10-CM

## 2022-09-01 DIAGNOSIS — D50.9 IRON DEFICIENCY ANEMIA, UNSPECIFIED IRON DEFICIENCY ANEMIA TYPE: ICD-10-CM

## 2022-09-01 DIAGNOSIS — N18.32 STAGE 3B CHRONIC KIDNEY DISEASE (CKD): Primary | ICD-10-CM

## 2022-09-01 DIAGNOSIS — I48.0 PAROXYSMAL ATRIAL FIBRILLATION: ICD-10-CM

## 2022-09-01 DIAGNOSIS — E11.40 TYPE 2 DIABETES MELLITUS WITH DIABETIC NEUROPATHY, WITH LONG-TERM CURRENT USE OF INSULIN: ICD-10-CM

## 2022-09-01 DIAGNOSIS — G62.9 PERIPHERAL POLYNEUROPATHY: ICD-10-CM

## 2022-09-01 DIAGNOSIS — L03.116 CELLULITIS OF LEFT LOWER EXTREMITY: ICD-10-CM

## 2022-09-01 DIAGNOSIS — Z79.4 TYPE 2 DIABETES MELLITUS WITH DIABETIC NEUROPATHY, WITH LONG-TERM CURRENT USE OF INSULIN: ICD-10-CM

## 2022-09-01 DIAGNOSIS — E11.40 POORLY CONTROLLED TYPE 2 DIABETES MELLITUS WITH NEUROPATHY: ICD-10-CM

## 2022-09-01 DIAGNOSIS — N18.31 STAGE 3A CHRONIC KIDNEY DISEASE: ICD-10-CM

## 2022-09-01 DIAGNOSIS — E55.9 VITAMIN D DEFICIENCY: ICD-10-CM

## 2022-09-01 DIAGNOSIS — N18.30 TYPE 1 DIABETES MELLITUS WITH STAGE 3 CHRONIC KIDNEY DISEASE, UNSPECIFIED WHETHER STAGE 3A OR 3B CKD: Primary | ICD-10-CM

## 2022-09-01 DIAGNOSIS — G89.29 CHRONIC BILATERAL LOW BACK PAIN, UNSPECIFIED WHETHER SCIATICA PRESENT: ICD-10-CM

## 2022-09-01 DIAGNOSIS — Z87.01 HISTORY OF PSEUDOMONAS PNEUMONIA: ICD-10-CM

## 2022-09-01 DIAGNOSIS — M54.50 CHRONIC BILATERAL LOW BACK PAIN, UNSPECIFIED WHETHER SCIATICA PRESENT: ICD-10-CM

## 2022-09-01 DIAGNOSIS — I50.32 CHRONIC DIASTOLIC (CONGESTIVE) HEART FAILURE: ICD-10-CM

## 2022-09-01 DIAGNOSIS — E10.22 TYPE 1 DIABETES MELLITUS WITH STAGE 3 CHRONIC KIDNEY DISEASE, UNSPECIFIED WHETHER STAGE 3A OR 3B CKD: Primary | ICD-10-CM

## 2022-09-01 DIAGNOSIS — E11.65 POORLY CONTROLLED TYPE 2 DIABETES MELLITUS WITH NEUROPATHY: ICD-10-CM

## 2022-09-01 PROBLEM — E11.22 TYPE 2 DM WITH CKD STAGE 3 AND HYPERTENSION: Status: ACTIVE | Noted: 2022-09-01

## 2022-09-01 PROBLEM — I12.9 TYPE 2 DM WITH CKD STAGE 3 AND HYPERTENSION: Status: ACTIVE | Noted: 2022-09-01

## 2022-09-01 LAB
AMPHET+METHAMPHET UR QL: NEGATIVE
AMPHETAMINES UR QL: NEGATIVE
BARBITURATES UR QL SCN: NEGATIVE
BENZODIAZ UR QL SCN: NEGATIVE
BUPRENORPHINE SERPL-MCNC: NEGATIVE NG/ML
CANNABINOIDS SERPL QL: NEGATIVE
COCAINE UR QL: NEGATIVE
EXPIRATION DATE: NORMAL
Lab: NORMAL
MDMA UR QL SCN: NEGATIVE
METHADONE UR QL SCN: NEGATIVE
OPIATES UR QL: NEGATIVE
OXYCODONE UR QL SCN: NEGATIVE
PCP UR QL SCN: NEGATIVE

## 2022-09-01 PROCEDURE — 80305 DRUG TEST PRSMV DIR OPT OBS: CPT | Performed by: FAMILY MEDICINE

## 2022-09-01 PROCEDURE — 99215 OFFICE O/P EST HI 40 MIN: CPT | Performed by: FAMILY MEDICINE

## 2022-09-01 RX ORDER — INSULIN LISPRO 100 U/ML
8 INJECTION, SOLUTION SUBCUTANEOUS 3 TIMES DAILY
Qty: 15 ML | Refills: 1 | Status: SHIPPED | OUTPATIENT
Start: 2022-09-01 | End: 2023-03-30 | Stop reason: SDUPTHER

## 2022-09-01 RX ORDER — INSULIN LISPRO 100 U/ML
INJECTION, SOLUTION SUBCUTANEOUS 3 TIMES DAILY
COMMUNITY
End: 2022-09-01 | Stop reason: DRUGHIGH

## 2022-09-01 RX ORDER — AMOXICILLIN AND CLAVULANATE POTASSIUM 875; 125 MG/1; MG/1
1 TABLET, FILM COATED ORAL 2 TIMES DAILY
Qty: 20 TABLET | Refills: 0 | Status: SHIPPED | OUTPATIENT
Start: 2022-09-01 | End: 2022-09-12

## 2022-09-01 RX ORDER — TRAMADOL HYDROCHLORIDE 50 MG/1
50 TABLET ORAL EVERY 6 HOURS PRN
Qty: 40 TABLET | Refills: 0 | Status: SHIPPED | OUTPATIENT
Start: 2022-09-01 | End: 2022-11-30 | Stop reason: SDUPTHER

## 2022-09-01 RX ORDER — INSULIN LISPRO 100 [IU]/ML
INJECTION, SOLUTION INTRAVENOUS; SUBCUTANEOUS
COMMUNITY
End: 2022-09-01

## 2022-09-01 RX ORDER — ACETAMINOPHEN 650 MG
TABLET, EXTENDED RELEASE ORAL AS NEEDED
Qty: 118 ML | Refills: 0 | Status: SHIPPED | OUTPATIENT
Start: 2022-09-01

## 2022-09-01 RX ORDER — GABAPENTIN 400 MG/1
400 CAPSULE ORAL NIGHTLY
Qty: 90 CAPSULE | Refills: 0 | Status: SHIPPED | OUTPATIENT
Start: 2022-09-01 | End: 2022-11-03

## 2022-09-01 NOTE — OUTREACH NOTE
AMBULATORY CASE MANAGEMENT NOTE    Name and Relationship of Patient/Support Person:  -     Checking in with Gómez - saw Dr. Bourne, she increased gabapentin, added Tramadol for pain control.  Currently scheduled with Dr. Engel on 9/8/22 for EGD/colon.  Will follow.     Interested in continuous glucose monitor for compliance. Unsure if taking any rapid insulin pre-meal.   Will send in new Rx with directions.   Will refer to diabetic educator for CGM help.    ALISSON GOMEZ  Ambulatory Case Management    9/1/2022, 15:07 EDT

## 2022-09-01 NOTE — PROGRESS NOTES
Preston Wallis presents to [facility].    Chief Complaint:  PRATIBHA from hospital admit on 6/3 and d/c 6/9/22    Subjective       History of Present Illness:  HPI     Gómez now has a diabetic bladder and will chronically need to be cathed.  He is tolerating the procedure well.  He sees urology Dr White    His L leg has had a lot of sun exposure and now he has an open sore that is not healing well.      Gómez has congestive heart failure with preserved ejection fraction, diastolic dysfunction (in addition he has chronic lower extremity edema).  He is stable on current meds.  He is on bumex.  He is working closely with our care coordinator nurse Sima.  He is to avoid salt in his diet    He is on positive pressure CPAP for obstructive sleep apnea at home and he is compliant    He also has low iron with iron deficiency anemia and sees hematology Dr. Hanson.  He had 2 iron infusions while in the hospital in May.  Colonoscopy and EGD pending with Dr Toro dasilva for Sept 8th     Patient presents with type 1 diabetes mellitus with diabetic chronic kidney disease and peripheral neuropathy.  Compliance with treatment has been poor; he skips some insulin doses due to forgetfulness and inconvenience of dosing and does not follow a diet and exercise regimen.    Tobacco screen: Non-smoker.  Current meds include insulin/injectable ( SSI, lantus 38 units nightly ), bydureon and farxiga.  He does not perform home blood glucose monitoring-he states they are [id number] wife states he is only checking his BS twice a day.   Has not fallen recently In regard to preventative care, his last ophthalmology exam was in 4/2020 per patient memory          Concerning essential (primary) hypertension, his current cardiac medication regimen includes a diuretic (bumex ), a beta-blocker ( Metoprolol 100 mg twice daily ), CCB procardia, and hydralazine.  Compliance  Has been good with pill packs.  He is tolerating the  medication well without side effects.  He has not kept a blood pressure diary, but states that pressures have been okay.           He has h/o hospitalization for pseudomonas pneumonia in past, and he gets recurrent infections and sees Dr Chavez and is on intermittent tobramycin nebulizer.  He is also on albuterol/duo nebs and symbicort daily, albuterol nebulizers       He has h/o PE and is on eliquis      H/o afib and is in NSR, on BB and eliquis    PN pain is worse with more RLS at night, he is on the gabapentin.      Worsening L knee pain, his pain is severe, he cant stand or balance due to pain, he has an ortho appt with Arti oropeza for knee replacement.     Review of Systems:  Review of Systems   Constitutional: Positive for fatigue. Negative for chills and fever.   HENT: Negative for congestion, ear discharge and sore throat.    Respiratory: Positive for cough, shortness of breath and wheezing.    Cardiovascular: Positive for leg swelling. Negative for chest pain and palpitations.   Gastrointestinal: Negative for abdominal pain, constipation, diarrhea, nausea, vomiting and GERD.   Genitourinary: Negative for flank pain.   Skin: Positive for rash.   Neurological: Negative for dizziness and headache.   Psychiatric/Behavioral: Positive for depressed mood. Negative for sleep disturbance. The patient is not nervous/anxious.         Objective   Medical History:  Past Medical History:   • Age-related cognitive decline   • Allergic contact dermatitis   • Allergies   • Anemia   • Bronchiectasis with acute lower respiratory infection (HCC)   • Chest pain   • Chronic bronchitis (HCC)   • Chronic diastolic (congestive) heart failure (HCC)   • Chronic kidney disease   • Chronic respiratory failure with hypoxia (HCC)   • COPD (chronic obstructive pulmonary disease) (HCC)   • Cough   • Dependence on supplemental oxygen   • Eczema   • Erectile dysfunction    due to organic reasons   • Essential (primary) hypertension   •  Fracture    closed fracture of other tarsal and metatarsal bones   • GERD without esophagitis   • High risk medication use   • Hypercholesteremia   • Hypomagnesemia   • Insomnia   • Low back pain   • Major depressive disorder   • Major depressive disorder   • Morbid (severe) obesity due to excess calories ([facility])   • MRSA pneumonia ([facility])   • Muscle weakness   • 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 without evidence of necrosis ([facility])   • Obstructive sleep apnea (adult) (pediatric)   • Other forms of dyspnea   • Other long term (current) drug therapy   • Other specified noninfective gastroenteritis and colitis   • Other spondylosis, lumbar region   • Pain in both knees   • Paroxysmal atrial fibrillation ([facility])   • Peripheral neuropathy    attributed to type 2 diabetes   • Pneumonia, unspecified organism   • Polyneuropathy   • Rash and other nonspecific skin eruption   • Syncope and collapse   • Tachycardia   • Type 1 diabetes mellitus with diabetic chronic kidney disease (HCC)   • Type 2 diabetes mellitus (HCC)   • Unspecified fall, initial encounter   • Urinary retention     Past Surgical History:   • CHOLECYSTECTOMY   • CYSTOSCOPY   • KNEE SURGERY   • OTHER SURGICAL HISTORY    venous port   • TONSILLECTOMY AND ADENOIDECTOMY      Family History   Problem Relation Age of Onset   • Coronary artery disease Mother    • Hypertension Mother    • Diabetes type II Mother    • Asthma Father    • Cancer Sister      Social History     Tobacco Use   • Smoking status: Former Smoker     Packs/day: 1.00     Years: 12.00     Pack years: 12.00     Types: Cigarettes     Start date:      Quit date:      Years since quittin.6   • Smokeless tobacco: Never Used   Substance Use Topics   • Alcohol use: Not Currently       Health Maintenance Due   Topic Date Due   • COLORECTAL CANCER SCREENING  Never done   • COVID-19 Vaccine (1) Never done   • ANNUAL PHYSICAL  Never done   • ZOSTER  VACCINE (1 of 2) Never done   • DIABETIC FOOT EXAM  Never done   • DIABETIC EYE EXAM  06/09/2021   • URINE MICROALBUMIN  07/27/2022        Immunization History   Administered Date(s) Administered   • Flu Vaccine Quad PF >36MO 10/18/2016, 10/16/2017, 11/04/2019   • Flu Vaccine Split Quad 11/04/2019   • Influenza Quad Vaccine (Inpatient) 09/19/2013   • Influenza, Unspecified 09/21/2020   • Pneumococcal Polysaccharide (PPSV23) 11/20/1997   • Tdap 09/18/2018       Allergies   Allergen Reactions   • Benadryl [Diphenhydramine] Itching   • Proventil [Albuterol] Other (See Comments)     Mouth sores          Medications:  Current Outpatient Medications on File Prior to Visit   Medication Sig   • [DISCONTINUED] Insulin Lispro (ADMELOG SOLOSTAR) 100 UNIT/ML injection pen Inject  under the skin into the appropriate area as directed 3 (Three) Times a Day. Per SSI, if BS <160 = 0 units, 161-220= 2 unit, 221-280= 4units, 281-340= 6units, 341-400= 8units  Dx: E11.40   • albuterol sulfate  (90 Base) MCG/ACT inhaler Inhale 2 puffs 4 (Four) Times a Day As Needed for Wheezing.   • apixaban (Eliquis) 5 MG tablet tablet Take 1 tablet by mouth Every 12 (Twelve) Hours.   • atorvastatin (LIPITOR) 20 MG tablet Take 1 tablet by mouth Every Night.   • bumetanide (BUMEX) 2 MG tablet Take 1 tablet twice daily .  Call cardiology  if weight is great then 2 pounds in one day or 5 pounds in a week   • Bydureon BCise 2 MG/0.85ML auto-injector injection inject 2mg (one pen) UNDER THE SKIN into THE appropriate AREA AS DIRECTED one time PER WEEK   • calcium citrate (CALCITRATE) 950 (200 Ca) MG tablet Take 1 tablet by mouth Daily.   • Cholecalciferol (Vitamin D3) 50 MCG (2000 UT) tablet Take 1 tablet by mouth Daily.   • dapagliflozin (Farxiga) 5 MG tablet tablet Take 1 tablet by mouth Daily.   • docusate sodium (COLACE) 100 MG capsule TAKE 1 CAPSULE BY MOUTH TWICE DAILY   • DULoxetine (CYMBALTA) 60 MG capsule Take 1 capsule by mouth 2 (Two) Times  a Day.   • famotidine (PEPCID) 40 MG tablet Take 1 tablet by mouth Daily.   • ferrous gluconate (FERGON) 324 MG tablet Take 1 tablet by mouth Daily With Breakfast.   • finasteride (PROSCAR) 5 MG tablet Take 1 tablet by mouth Daily.   • Fluticasone-Umeclidin-Vilant (Trelegy Ellipta) 200-62.5-25 MCG/INH inhaler Inhale 1 puff Daily for 30 days. Rinse mouth out after each use   • folic acid (FOLVITE) 1 MG tablet Take 1 tablet by mouth Daily.   • guaifenesin-dextromethorphan (MUCINEX DM)  MG tablet sustained-release 12 hour tablet Take 1 tablet by mouth 2 (Two) Times a Day As Needed (congestion).   • heparin 100 UNIT/ML solution injection 5 mL by Intracatheter route Every 30 (Thirty) Days.   • hydrALAZINE (APRESOLINE) 50 MG tablet Take 1 tablet by mouth 2 (Two) Times a Day.   • Insulin Pen Needle (B-D UF III MINI PEN NEEDLES) 31G X 5 MM misc Inject 1 each under the skin into the appropriate area as directed See Admin Instructions. with insulin   • ipratropium-albuterol (DUO-NEB) 0.5-2.5 mg/3 ml nebulizer Take 3 mL by nebulization Every 4 (Four) Hours As Needed for Wheezing or Shortness of Air for up to 30 days. Cancel plain albuterol nebs   • Lantus SoloStar 100 UNIT/ML injection pen INJECT 40 UNITS UNDER THE SKIN ONCE DAILY   • metoprolol tartrate (LOPRESSOR) 100 MG tablet Take 1 tablet by mouth 2 (Two) Times a Day.   • montelukast (SINGULAIR) 10 MG tablet Take 1 tablet by mouth Every Night.   • NIFEdipine XL (PROCARDIA XL) 30 MG 24 hr tablet Take 30 mg by mouth Every Morning.   • O2 (OXYGEN) 2 Liter O2 - CONTINUOUS (route: Oxygen)   • sodium chloride 0.9 % injection Infuse 10 mL into a venous catheter Every 30 (Thirty) Days. Standing order.  Port flush q 30 days - 5mL Heparin   • tamsulosin (FLOMAX) 0.4 MG capsule 24 hr capsule Take 2 capsules by mouth Every Evening.   • traZODone (DESYREL) 100 MG tablet Take 1 tablet by mouth Every Night.   • TRUEplus Lancets 28G misc USE AS DIRECTED   • [DISCONTINUED]  "gabapentin (NEURONTIN) 300 MG capsule Take 1 capsule by mouth every night at bedtime.   • [DISCONTINUED] glucose blood test strip 1 each by Other route 4 (Four) Times a Day With Meals & at Bedtime. Use as instructed dx e 11.9   • [DISCONTINUED] Insulin Lispro (ADMELOG SOLOSTAR SC) Inject  under the skin into the appropriate area as directed 3 (Three) Times a Day. Per SSI, if BS <160 = 0 units, 161-220= 2 unit, 221-280= 4units, 281-340= 6units, 341-400= 8units  Dx: E11.40   • [DISCONTINUED] Insulin Lispro (humaLOG) 100 UNIT/ML injection Inject  under the skin into the appropriate area as directed 3 (Three) Times a Day Before Meals.     No current facility-administered medications on file prior to visit.       Vital Signs:   /74 (BP Location: Right arm, Patient Position: Sitting, Cuff Size: Large Adult)   Pulse 80   Ht 175.3 cm (69.02\")   Wt 123 kg (271 lb) Comment: pt [id number] at home scale  SpO2 94% Comment: Room air  BMI 40.00 kg/m²       Physical Exam:  Physical Exam  Vitals and nursing note reviewed.   Constitutional:       General: He is not in acute distress.     Appearance: Normal appearance. He is not ill-appearing, toxic-appearing or diaphoretic.   HENT:      Head: Normocephalic and atraumatic.      Right Ear: Tympanic membrane, ear canal and external ear normal.      Left Ear: Tympanic membrane, ear canal and external ear normal.      Nose: No congestion or rhinorrhea.      Mouth/Throat:      Mouth: Mucous membranes are moist.      Pharynx: Oropharynx is clear. No oropharyngeal exudate or posterior oropharyngeal erythema.   Eyes:      Extraocular Movements: Extraocular movements intact.      Conjunctiva/sclera: Conjunctivae normal.      Pupils: Pupils are equal, round, and reactive to light.   Cardiovascular:      Rate and Rhythm: Normal rate and regular rhythm.      Pulses:           Dorsalis pedis pulses are 2+ on the right side and 2+ on the left side.      Heart sounds: Normal heart sounds. "   Pulmonary:      Effort: Pulmonary effort is normal.      Breath sounds: Normal breath sounds. No wheezing, rhonchi or rales.   Abdominal:      General: Abdomen is flat.      Palpations: Abdomen is soft.   Musculoskeletal:      Cervical back: Neck supple. No rigidity.   Feet:      Right foot:      Protective Sensation: 7 sites tested. 7 sites sensed.      Skin integrity: Skin integrity normal. No ulcer or blister.      Toenail Condition: Right toenails are normal.      Left foot:      Protective Sensation: 7 sites tested. 7 sites sensed.      Skin integrity: Skin integrity normal. No ulcer or blister.      Toenail Condition: Left toenails are normal.      Comments: Diabetic Foot Exam Performed and Monofilament Test Performed     Lymphadenopathy:      Cervical: No cervical adenopathy.   Skin:     General: Skin is warm and dry.          Neurological:      Mental Status: He is alert and oriented to person, place, and time.   Psychiatric:         Mood and Affect: Mood normal.         Behavior: Behavior normal.         Result Review      The following data was reviewed by Kimmy Riley MD on 06/30/2022.  Lab Results   Component Value Date    WBC 11.35 (H) 08/22/2022    HGB 10.4 (L) 08/22/2022    HCT 34.1 (L) 08/22/2022    MCV 89.0 08/22/2022     08/22/2022     Lab Results   Component Value Date    GLUCOSE 358 (H) 08/22/2022    BUN 31 (H) 08/22/2022    CREATININE 1.63 (H) 08/22/2022    EGFRIFNONA 46 (L) 07/27/2021    BCR 19.0 08/22/2022    K 4.3 08/22/2022    CO2 33.6 (H) 08/22/2022    CALCIUM 8.9 08/22/2022    PROTENTOTREF 7.1 08/22/2022    ALBUMIN 3.0 08/22/2022    LABIL2 0.8 08/22/2022    AST 19 06/22/2022    ALT 20 06/22/2022     Lab Results   Component Value Date    CHOL 131 03/17/2022    CHLPL 165 08/26/2020    TRIG 69 03/17/2022    HDL 65 (H) 03/17/2022    LDL 52 03/17/2022     Lab Results   Component Value Date    TSH 0.580 03/17/2020     Lab Results   Component Value Date    HGBA1C 8.30 (H)  06/22/2022     Lab Results   Component Value Date    PSA 0.725 03/17/2022                         Assessment and Plan:          Diagnoses and all orders for this visit:    1. Type 1 diabetes mellitus with stage 3 chronic kidney disease, unspecified whether stage 3a or 3b CKD (HCC) (Primary)  Comments:  Not well controlled.  We will have him add a blood sugar check and sliding scale insulin midday.    2. Iron deficiency anemia, unspecified iron deficiency anemia type  Comments:  He is seen Dr. Hanson and on iron supplementation and pending colonoscopy and EGD    3. Chronic diastolic (congestive) heart failure (HCC)  Comments:  Stable on Bumex and beta-blocker/calcium channel blocker.  No changes needed in current medication    4. Peripheral polyneuropathy  Comments:  Worse.  We will increase gabapentin to 400 mg nightly.  Continue diabetic shoes  Orders:  -     gabapentin (NEURONTIN) 400 MG capsule; Take 1 capsule by mouth Every Night.  Dispense: 90 capsule; Refill: 0    5. Essential (primary) hypertension  Comments:  Stable and very well controlled on current medications.  No changes are needed in his blood pressure medications or treatment plan at this time    6. Chronic bilateral low back pain, unspecified whether sciatica present  Comments:  Chronic and ongoing.  Today's is face-to-face, he is on gabapentin and he tolerates meds well.  Nosi/sx of abuse or diversion.  I will add tramadol to help with  Orders:  -     traMADol (ULTRAM) 50 MG tablet; Take 1 tablet by mouth Every 6 (Six) Hours As Needed for Moderate Pain.  Dispense: 40 tablet; Refill: 0    7. Paroxysmal atrial fibrillation (HCC)  Comments:  He is in normal sinus rhythm today.  No changes in current medications.  Follow-up with cardiology    8. Stage 3a chronic kidney disease (HCC)  Comments:  Labs reviewed and stable    9. Neurogenic bladder    10. Vitamin D deficiency    11. History of Pseudomonas pneumonia    12. High risk medication use  -     POC  Urine Drug Screen [facility]    13. Primary osteoarthritis of left knee  Comments:  Worse, he is seeing Ortho and will need a total knee replacement.  I will start him on tramadol for the pain    14. Cellulitis of left lower extremity  -     amoxicillin-clavulanate (Augmentin) 875-125 MG per tablet; Take 1 tablet by mouth 2 (Two) Times a Day for 10 days.  Dispense: 20 tablet; Refill: 0  -     povidone-iodine (Betadine) 10 % external solution; Apply  topically to the appropriate area as directed As Needed for Wound Care.  Dispense: 118 mL; Refill: 0    15. Poorly controlled type 2 diabetes mellitus with neuropathy (HCC)  Comments:  We will start him on sliding scale insulin and increase his Lantus to 40 units daily  Orders:  -     Insulin Lispro (ADMELOG SOLOSTAR) 100 UNIT/ML injection pen; Inject 8 Units under the skin into the appropriate area as directed 3 (Three) Times a Day. Per SSI, if BS <160 = 0 units, 161-220= 2 unit, 221-280= 4units, 281-340= 6units, 341-400= 8units  Dx: E11.40  Dispense: 15 mL; Refill: 1  -     glucose blood test strip; 1 each by Other route 3 (Three) Times a Day. Check blood sugar each meal 3 times daily     Dx:   E11.40  Dispense: 100 each; Refill: 4    16. Type 2 diabetes mellitus with diabetic neuropathy, with long-term current use of insulin (HCC)  -     Insulin Lispro (ADMELOG SOLOSTAR) 100 UNIT/ML injection pen; Inject 8 Units under the skin into the appropriate area as directed 3 (Three) Times a Day. Per SSI, if BS <160 = 0 units, 161-220= 2 unit, 221-280= 4units, 281-340= 6units, 341-400= 8units  Dx: E11.40  Dispense: 15 mL; Refill: 1  -     glucose blood test strip; 1 each by Other route 3 (Three) Times a Day. Check blood sugar each meal 3 times daily     Dx:   E11.40  Dispense: 100 each; Refill: 4  -     Ambulatory Referral to Diabetic Education      I spent over 60 minutes with the patient reviewing the record counseling refilling meds and going over treatment plans as  well as documenting.  Follow Up   Return in about 3 months (around 12/1/2022), or if symptoms worsen or fail to improve.

## 2022-09-08 ENCOUNTER — PATIENT OUTREACH (OUTPATIENT)
Dept: CASE MANAGEMENT | Facility: OTHER | Age: 57
End: 2022-09-08

## 2022-09-08 DIAGNOSIS — N18.32 STAGE 3B CHRONIC KIDNEY DISEASE (CKD): Primary | ICD-10-CM

## 2022-09-08 NOTE — OUTREACH NOTE
AMBULATORY CASE MANAGEMENT NOTE    Name and Relationship of Patient/Support Person:  -     Pulled labs from [facility] - pre-op for EGD / colon today.  Report not available at this time.  Elizabeth reports should go home today.       ALISSON GOMEZ  Ambulatory Case Management    9/8/2022, 13:01 EDT     AMBULATORY CASE MANAGEMENT NOTE    Name and Relationship of Patient/Support Person:  -     Called Elizabeth and Gómez came home yesterday - 1 polyp.  Will request reports.  Scheduled 2 month follow up.        ALISSON GOMEZ  Ambulatory Case Management    9/9/2022, 14:14 EDT

## 2022-09-12 ENCOUNTER — PATIENT OUTREACH (OUTPATIENT)
Dept: CASE MANAGEMENT | Facility: OTHER | Age: 57
End: 2022-09-12

## 2022-09-12 DIAGNOSIS — N18.32 STAGE 3B CHRONIC KIDNEY DISEASE (CKD): Primary | ICD-10-CM

## 2022-09-12 DIAGNOSIS — F51.01 PRIMARY INSOMNIA: ICD-10-CM

## 2022-09-12 DIAGNOSIS — D50.8 IRON DEFICIENCY ANEMIA SECONDARY TO INADEQUATE DIETARY IRON INTAKE: ICD-10-CM

## 2022-09-12 PROCEDURE — 99489 CPLX CHRNC CARE EA ADDL 30: CPT | Performed by: FAMILY MEDICINE

## 2022-09-12 PROCEDURE — 99487 CPLX CHRNC CARE 1ST 60 MIN: CPT | Performed by: FAMILY MEDICINE

## 2022-09-12 RX ORDER — TRAZODONE HYDROCHLORIDE 100 MG/1
100 TABLET ORAL NIGHTLY
Qty: 90 TABLET | Refills: 1 | Status: SHIPPED | OUTPATIENT
Start: 2022-09-12 | End: 2022-11-03

## 2022-09-12 RX ORDER — DOXYCYCLINE HYCLATE 50 MG/1
324 CAPSULE, GELATIN COATED ORAL
Qty: 90 TABLET | Refills: 1 | Status: SHIPPED | OUTPATIENT
Start: 2022-09-12 | End: 2023-02-27

## 2022-09-12 NOTE — OUTREACH NOTE
AMBULATORY CASE MANAGEMENT NOTE    Name and Relationship of Patient/Support Person:  -     Gómez/chaim came by to drop off medications to be filled.  Filled up 3 weeks, called pharmacy for the medications that could not be filled due to supply.     Reached out to urology - he is still taking tamsulosin 0.8mg - he is not urinating on his own and is being self cathed.    Response from Dr. White:  It was a very good question thank you.  We can stop Flomax completely on this patient because he does not have any detrusor function.  I will follow him like usual.  Thank you for your help.    Removed Flomax from medication planner and called let Harinder at [facility] know medication d/c.    Reached out to nephrology to see if they want to send order for port flush next month- fax order .  Outreach made for refill and lab order follow up.     Reviewed Dr. Engel operative note.  Not sure of follow up appt.     ALISSON GOMEZ  Ambulatory Case Management    9/12/2022, 11:23 EDT

## 2022-09-26 ENCOUNTER — PATIENT OUTREACH (OUTPATIENT)
Dept: CASE MANAGEMENT | Facility: OTHER | Age: 57
End: 2022-09-26

## 2022-09-26 DIAGNOSIS — Z79.4 UNCONTROLLED TYPE 2 DIABETES MELLITUS WITH HYPERGLYCEMIA, WITH LONG-TERM CURRENT USE OF INSULIN: Primary | ICD-10-CM

## 2022-09-26 DIAGNOSIS — I50.32 CHRONIC DIASTOLIC HEART FAILURE: Primary | ICD-10-CM

## 2022-09-26 DIAGNOSIS — N18.31 TYPE 1 DIABETES MELLITUS WITH STAGE 3A CHRONIC KIDNEY DISEASE: ICD-10-CM

## 2022-09-26 DIAGNOSIS — J44.1 COPD WITH EXACERBATION: ICD-10-CM

## 2022-09-26 DIAGNOSIS — E11.65 UNCONTROLLED TYPE 2 DIABETES MELLITUS WITH HYPERGLYCEMIA, WITH LONG-TERM CURRENT USE OF INSULIN: Primary | ICD-10-CM

## 2022-09-26 DIAGNOSIS — R09.81 SINUS CONGESTION: ICD-10-CM

## 2022-09-26 DIAGNOSIS — K59.09 OTHER CONSTIPATION: ICD-10-CM

## 2022-09-26 DIAGNOSIS — E10.22 TYPE 1 DIABETES MELLITUS WITH STAGE 3A CHRONIC KIDNEY DISEASE: ICD-10-CM

## 2022-09-26 NOTE — TELEPHONE ENCOUNTER
Dr. Riley, I asked Gómez/Elizabeth why he was taking a cough suppressant 2 times a day all the time (Mucinex DM) - and they weren't sure when it was started and why.      Can I suggest that we hold the medication for a couple of weeks to see how he does without.  Quite possibly this could have been prescribed from a hospitalization for pneumonia and has just remained on his list.    Another option would be to change just to plain Mucinex without the suppressant?     If we held the medication, they can always add back - I fill his medications every 3 weeks.

## 2022-09-26 NOTE — OUTREACH NOTE
AMBULATORY CASE MANAGEMENT NOTE    Name and Relationship of Patient/Support Person:  -     Called nephrology and cardiology and Dr. Hanson to see if they needed any labs ordered in advance of his appointment.  Also pended refill for PCP to sign and faxed a list for [facility] to fill and specialists.     ALISSON GOMEZ  Ambulatory Case Management    9/26/2022, 13:02 EDT   AMBULATORY CASE MANAGEMENT NOTE    Name and Relationship of Patient/Support Person: Preston Wallis - Self    Called and requested lab order from nephrology.  Faxed to [facility].  Unsuccessful reaching Dr. Hanson office for lab order.  Reached out to cardiology, added bnp.  Will see if PCP would like any in addition.     ALISSON GOMEZ  Ambulatory Case Management    9/27/2022, 14:32 EDT

## 2022-09-27 DIAGNOSIS — N18.31 TYPE 1 DIABETES MELLITUS WITH STAGE 3A CHRONIC KIDNEY DISEASE: Primary | ICD-10-CM

## 2022-09-27 DIAGNOSIS — E10.22 TYPE 1 DIABETES MELLITUS WITH STAGE 3A CHRONIC KIDNEY DISEASE: Primary | ICD-10-CM

## 2022-09-27 RX ORDER — GUAIFENESIN, DEXTROMETHORPHAN HBR 600; 30 MG/1; MG/1
TABLET ORAL
Qty: 60 TABLET | Refills: 3 | Status: SHIPPED | OUTPATIENT
Start: 2022-09-27

## 2022-09-27 RX ORDER — DOCUSATE SODIUM 100 MG/1
100 CAPSULE, LIQUID FILLED ORAL 2 TIMES DAILY
Qty: 180 CAPSULE | Refills: 1 | Status: SHIPPED | OUTPATIENT
Start: 2022-09-27 | End: 2022-11-28 | Stop reason: SDUPTHER

## 2022-09-27 RX ORDER — FLURBIPROFEN SODIUM 0.3 MG/ML
SOLUTION/ DROPS OPHTHALMIC
Qty: 100 EACH | Refills: 3 | Status: SHIPPED | OUTPATIENT
Start: 2022-09-27 | End: 2023-01-09

## 2022-09-29 ENCOUNTER — PATIENT OUTREACH (OUTPATIENT)
Dept: CASE MANAGEMENT | Facility: OTHER | Age: 57
End: 2022-09-29

## 2022-09-29 DIAGNOSIS — I87.2 VENOUS INSUFFICIENCY (CHRONIC) (PERIPHERAL): ICD-10-CM

## 2022-09-29 DIAGNOSIS — I50.32 CHRONIC DIASTOLIC (CONGESTIVE) HEART FAILURE: ICD-10-CM

## 2022-09-29 DIAGNOSIS — Z79.4 UNCONTROLLED TYPE 2 DIABETES MELLITUS WITH HYPERGLYCEMIA, WITH LONG-TERM CURRENT USE OF INSULIN: ICD-10-CM

## 2022-09-29 DIAGNOSIS — I50.32 CHRONIC DIASTOLIC (CONGESTIVE) HEART FAILURE: Primary | ICD-10-CM

## 2022-09-29 DIAGNOSIS — N18.32 STAGE 3B CHRONIC KIDNEY DISEASE (CKD): ICD-10-CM

## 2022-09-29 DIAGNOSIS — E11.65 UNCONTROLLED TYPE 2 DIABETES MELLITUS WITH HYPERGLYCEMIA, WITH LONG-TERM CURRENT USE OF INSULIN: ICD-10-CM

## 2022-09-29 DIAGNOSIS — D50.8 IRON DEFICIENCY ANEMIA SECONDARY TO INADEQUATE DIETARY IRON INTAKE: ICD-10-CM

## 2022-09-29 RX ORDER — BUMETANIDE 2 MG/1
TABLET ORAL
Qty: 180 TABLET | Refills: 1 | Status: SHIPPED | OUTPATIENT
Start: 2022-09-29 | End: 2023-03-23

## 2022-09-29 NOTE — OUTREACH NOTE
AMBULATORY CASE MANAGEMENT NOTE    Name and Relationship of Patient/Support Person:  -     Gómez dropped off his medications for me to fill for 21 days.  Needed refill on bumex, sent to pharmacy.      He is requesting pain medication for his left knee pain.  The toradol was ineffective and requesting Hydrocodone 5.        ALISSON R  Ambulatory Case Management    9/29/2022, 15:33 EDT     [facility] End of Month Documentation    This Chronic Medical Management Care Plan for Preston Wallis, 57 y.o. male, has been monitored and managed; reviewed and a new plan of care implemented for the month of September.  A cumulative time of 122  minutes was spent on this patient record this month, including phone call with care giver; electronic communication with other providers; electronic communication with primary care provider; electronic communication with pharmacist; chart review; phone call with pharmacist.    Regarding the patient's problems: has Chronic cough; Type 1 diabetes mellitus with diabetic chronic kidney disease (Formerly [facility]); Polyneuropathy; Peripheral neuropathy; Paroxysmal atrial fibrillation ([facility]); MILADY (obstructive sleep apnea); MRSA pneumonia ([facility]); Low back pain; Insomnia; Essential (primary) hypertension; Chronic diastolic (congestive) heart failure ([facility]); Allergies; COPD (chronic obstructive pulmonary disease) (Formerly [facility]); Chronic anticoagulation; Benign prostatic hyperplasia; Doyle catheter in place; Microscopic hematuria; Impaired mobility and endurance; Stage 3b chronic kidney disease (CKD) (Formerly [facility]); Iron deficiency anemia secondary to inadequate dietary iron intake; Vitamin D deficiency due to chronic kidney disease; Class 3 severe obesity with serious comorbidity in adult (Formerly [facility]); Lower extremity edema; Elevated alkaline phosphatase level; Venous insufficiency (chronic) (peripheral); Tobacco abuse, in remission; History of Pseudomonas pneumonia; Chronic dyspnea; Gastroesophageal reflux disease; Bronchiectasis without  complication (HCC); Wheezing; Poorly controlled type 2 diabetes mellitus with neuropathy (HCC); and Type 2 DM with CKD stage 3 and hypertension (HCC) on their problem list., the following items were addressed: medications; transitions to medical care; medical records; referrals to community service providers and any changes can be found within the plan section of the note.  A detailed listing of time spent for chronic care management is tracked within each outreach encounter.  Current medications include:  has a current medication list which includes the following prescription(s): albuterol sulfate hfa, apixaban, atorvastatin, b-d uf iii mini pen needles, bumetanide, bydureon bcise, calcium citrate, vitamin d3, farxiga, mucus relief dm, docusate sodium, duloxetine, famotidine, ferrous gluconate, finasteride, folic acid, gabapentin, glucose blood, heparin, hydralazine, insulin lispro, lantus solostar, metoprolol tartrate, montelukast, nifedipine xl, o2, povidone-iodine, sodium chloride, tramadol, trazodone, and trueplus lancets 28g. and the patient is reported to be caregiver will take responsibility for med compliance,  Medications are reported to be non-effective in controlling symptoms and changes have been made to the medication protocol.  Regarding these diagnoses, referrals were made to the following provider(s):  specialists.  All notes on chart for PCP to review.    The patient was monitored remotely for pain; medications.    The patient's physical needs include:  help taking medications as prescribed; medication education; needs assistance with ADLs; physical healthcare; resources for disability needs; physician referral.     The patient's mental support needs include:  continued support    The patient's cognitive support needs include:  medication; continued support; needs assistance with ADLs    The patient's psychosocial support needs include:  continued support; coordination of community providers;  medication management or adherence    The patient's functional needs include: health care coverage; medication education; needs assistance for ADLs; physical healthcare; physician referral; resources for disability needs    The patient's environmental needs include:  resources for disability needs    Care Plan overall comments:  Still not at goal, however improving. will continue to follow.    Refer to previous outreach notes for more information on the areas listed above.    Monthly Billing Diagnoses  (I50.32) Chronic diastolic (congestive) heart failure (HCC)    (E11.65,  Z79.4) Uncontrolled type 2 diabetes mellitus with hyperglycemia, with long-term current use of insulin (HCC)    (D50.8) Iron deficiency anemia secondary to inadequate dietary iron intake    (N18.32) Stage 3b chronic kidney disease (CKD) (HCC)    (I87.2) Venous insufficiency (chronic) (peripheral)    Medications   · Medications have been reconciled    Care Plan progress this month:      Recently Modified Care Plans Updates made since 8/29/2022 12:00 AM    No recently modified care plans.          · Current Specialty Plan of Care Status signed by both patient and provider    Instructions   · Patient was provided an electronic copy of care plan  · CCM services were explained and offered and patient has accepted these services.  · Patient has given their written consent to receive CCM services and understands that this includes the authorization of electronic communication of medical information with the other treating providers.  · Patient understands that they may stop CCM services at any time and these changes will be effective at the end of the calendar month and will effectively revocate the agreement of CCM services.  · Patient understands that only one practitioner can furnish and be paid for CCM services during one calendar month.  Patient also understands that there may be co-payment or deductible fees in association with CCM  services.  · Patient will continue with at least monthly follow-up calls with the Nurse Navigator.    ALISSON GOMEZ  Ambulatory Case Management    9/29/2022, 15:33 EDT

## 2022-10-05 ENCOUNTER — LAB REQUISITION (OUTPATIENT)
Dept: LAB | Facility: HOSPITAL | Age: 57
End: 2022-10-05

## 2022-10-05 DIAGNOSIS — J44.9 CHRONIC OBSTRUCTIVE PULMONARY DISEASE, UNSPECIFIED: ICD-10-CM

## 2022-10-05 DIAGNOSIS — E11.9 TYPE 2 DIABETES MELLITUS WITHOUT COMPLICATIONS: ICD-10-CM

## 2022-10-05 DIAGNOSIS — I50.9 HEART FAILURE, UNSPECIFIED: ICD-10-CM

## 2022-10-05 DIAGNOSIS — R80.1 PERSISTENT PROTEINURIA, UNSPECIFIED: ICD-10-CM

## 2022-10-05 DIAGNOSIS — N19 UNSPECIFIED KIDNEY FAILURE: ICD-10-CM

## 2022-10-05 LAB
C3 SERPL-MCNC: 151 MG/DL (ref 82–167)
C4 SERPL-MCNC: 31 MG/DL (ref 14–44)
NT-PROBNP SERPL-MCNC: 286 PG/ML (ref 0–900)

## 2022-10-05 PROCEDURE — 86235 NUCLEAR ANTIGEN ANTIBODY: CPT | Performed by: FAMILY MEDICINE

## 2022-10-05 PROCEDURE — 83036 HEMOGLOBIN GLYCOSYLATED A1C: CPT | Performed by: FAMILY MEDICINE

## 2022-10-05 PROCEDURE — 86037 ANCA TITER EACH ANTIBODY: CPT | Performed by: FAMILY MEDICINE

## 2022-10-05 PROCEDURE — 83516 IMMUNOASSAY NONANTIBODY: CPT | Performed by: FAMILY MEDICINE

## 2022-10-05 PROCEDURE — 86225 DNA ANTIBODY NATIVE: CPT | Performed by: FAMILY MEDICINE

## 2022-10-05 PROCEDURE — 83880 ASSAY OF NATRIURETIC PEPTIDE: CPT | Performed by: FAMILY MEDICINE

## 2022-10-05 PROCEDURE — 86160 COMPLEMENT ANTIGEN: CPT | Performed by: FAMILY MEDICINE

## 2022-10-06 ENCOUNTER — PATIENT OUTREACH (OUTPATIENT)
Dept: CASE MANAGEMENT | Facility: OTHER | Age: 57
End: 2022-10-06

## 2022-10-06 DIAGNOSIS — I50.32 CHRONIC DIASTOLIC (CONGESTIVE) HEART FAILURE: Primary | ICD-10-CM

## 2022-10-06 DIAGNOSIS — I25.10 CORONARY ARTERY DISEASE INVOLVING NATIVE CORONARY ARTERY OF NATIVE HEART WITHOUT ANGINA PECTORIS: Primary | ICD-10-CM

## 2022-10-06 LAB
CENTROMERE B AB SER-ACNC: <0.2 AI (ref 0–0.9)
CHROMATIN AB SERPL-ACNC: <0.2 AI (ref 0–0.9)
DSDNA AB SER-ACNC: <1 IU/ML (ref 0–9)
ENA JO1 AB SER-ACNC: <0.2 AI (ref 0–0.9)
ENA RNP AB SER-ACNC: <0.2 AI (ref 0–0.9)
ENA SCL70 AB SER-ACNC: <0.2 AI (ref 0–0.9)
ENA SM AB SER-ACNC: <0.2 AI (ref 0–0.9)
ENA SS-A AB SER-ACNC: <0.2 AI (ref 0–0.9)
ENA SS-B AB SER-ACNC: <0.2 AI (ref 0–0.9)
HBA1C MFR BLD: 9.1 % (ref 4.8–5.6)
Lab: NORMAL

## 2022-10-06 RX ORDER — ISOSORBIDE MONONITRATE 30 MG/1
30 TABLET, EXTENDED RELEASE ORAL DAILY
Qty: 90 TABLET | Refills: 1 | Status: SHIPPED | OUTPATIENT
Start: 2022-10-06

## 2022-10-06 RX ORDER — ISOSORBIDE MONONITRATE 30 MG/1
30 TABLET, EXTENDED RELEASE ORAL DAILY
COMMUNITY
Start: 2022-10-04 | End: 2022-10-06 | Stop reason: SDUPTHER

## 2022-10-06 NOTE — OUTREACH NOTE
AMBULATORY CASE MANAGEMENT NOTE    Name and Relationship of Patient/Support Person:  -     Gómez was reporting that his left knee is swollen and painful.  He took the Tramadol 50mg tid without relief.  Would like to have some Hydrocodone, it seems to help.  F2F on 9/1/22.      Requested records from Dr. Engel, barium swallow done.  Given Rx for Isosorbide, added to med list.      Labs drawn by home health yesterday ordered by nephrology, reviewed.  Not all resulted yet.      Education Documentation  No documentation found.        ALISSON R  Ambulatory Case Management    10/6/2022, 15:18 EDT

## 2022-10-06 NOTE — PROGRESS NOTES
I show he is on tramadol not hydrocodone.  Looks like he get put on it 1 time in July.  Why is he wanting hydrocodone now.  He will need to be seen in person for any kind of pain med change. denzel

## 2022-10-07 ENCOUNTER — LAB REQUISITION (OUTPATIENT)
Dept: LAB | Facility: HOSPITAL | Age: 57
End: 2022-10-07

## 2022-10-07 DIAGNOSIS — E11.9 TYPE 2 DIABETES MELLITUS WITHOUT COMPLICATIONS: ICD-10-CM

## 2022-10-07 DIAGNOSIS — E55.9 VITAMIN D DEFICIENCY: ICD-10-CM

## 2022-10-07 DIAGNOSIS — I50.9 HEART FAILURE, UNSPECIFIED: ICD-10-CM

## 2022-10-07 DIAGNOSIS — R80.1 PERSISTENT PROTEINURIA, UNSPECIFIED: ICD-10-CM

## 2022-10-07 DIAGNOSIS — N19 UNSPECIFIED KIDNEY FAILURE: ICD-10-CM

## 2022-10-07 DIAGNOSIS — J44.9 CHRONIC OBSTRUCTIVE PULMONARY DISEASE, UNSPECIFIED: ICD-10-CM

## 2022-10-07 LAB
C-ANCA TITR SER IF: ABNORMAL TITER
MYELOPEROXIDASE AB SER IA-ACNC: <0.2 UNITS (ref 0–0.9)
P-ANCA ATYPICAL TITR SER IF: ABNORMAL TITER
P-ANCA TITR SER IF: ABNORMAL TITER
PROTEINASE3 AB SER IA-ACNC: <0.2 UNITS (ref 0–0.9)

## 2022-10-07 PROCEDURE — 86335 IMMUNFIX E-PHORSIS/URINE/CSF: CPT | Performed by: FAMILY MEDICINE

## 2022-10-07 RX ORDER — CHOLECALCIFEROL (VITAMIN D3) 125 MCG
CAPSULE ORAL
Qty: 90 TABLET | Refills: 1 | Status: SHIPPED | OUTPATIENT
Start: 2022-10-07 | End: 2023-03-13

## 2022-10-07 NOTE — PROGRESS NOTES
Correct, he was requesting Hydrocodone.  However he will remain on Tramadol until he is seen again.  Refill request sent for Tramadol.

## 2022-10-11 LAB — INTERPRETATION UR IFE-IMP: NORMAL

## 2022-10-18 ENCOUNTER — OUTSIDE FACILITY SERVICE (OUTPATIENT)
Dept: FAMILY MEDICINE CLINIC | Age: 57
End: 2022-10-18

## 2022-10-18 PROCEDURE — OUTSIDEPOS PR OUTSIDE POS PLACEHOLDER: Performed by: FAMILY MEDICINE

## 2022-10-19 ENCOUNTER — PATIENT OUTREACH (OUTPATIENT)
Dept: CASE MANAGEMENT | Facility: OTHER | Age: 57
End: 2022-10-19

## 2022-10-19 ENCOUNTER — CLINICAL SUPPORT (OUTPATIENT)
Dept: FAMILY MEDICINE CLINIC | Age: 57
End: 2022-10-19

## 2022-10-19 ENCOUNTER — OFFICE VISIT (OUTPATIENT)
Dept: CARDIOLOGY | Facility: CLINIC | Age: 57
End: 2022-10-19

## 2022-10-19 VITALS
HEIGHT: 69 IN | BODY MASS INDEX: 39.99 KG/M2 | SYSTOLIC BLOOD PRESSURE: 150 MMHG | HEART RATE: 84 BPM | DIASTOLIC BLOOD PRESSURE: 71 MMHG | WEIGHT: 270 LBS

## 2022-10-19 DIAGNOSIS — Z23 NEED FOR INFLUENZA VACCINATION: Primary | ICD-10-CM

## 2022-10-19 DIAGNOSIS — I50.32 CHRONIC DIASTOLIC (CONGESTIVE) HEART FAILURE: Primary | ICD-10-CM

## 2022-10-19 DIAGNOSIS — I10 ESSENTIAL (PRIMARY) HYPERTENSION: ICD-10-CM

## 2022-10-19 DIAGNOSIS — D50.8 IRON DEFICIENCY ANEMIA SECONDARY TO INADEQUATE DIETARY IRON INTAKE: Primary | ICD-10-CM

## 2022-10-19 DIAGNOSIS — I48.0 PAROXYSMAL ATRIAL FIBRILLATION: ICD-10-CM

## 2022-10-19 PROCEDURE — 90471 IMMUNIZATION ADMIN: CPT | Performed by: FAMILY MEDICINE

## 2022-10-19 PROCEDURE — 99490 CHRNC CARE MGMT STAFF 1ST 20: CPT | Performed by: FAMILY MEDICINE

## 2022-10-19 PROCEDURE — 99213 OFFICE O/P EST LOW 20 MIN: CPT | Performed by: INTERNAL MEDICINE

## 2022-10-19 PROCEDURE — 90686 IIV4 VACC NO PRSV 0.5 ML IM: CPT | Performed by: FAMILY MEDICINE

## 2022-10-19 PROCEDURE — 99439 CHRNC CARE MGMT STAF EA ADDL: CPT | Performed by: FAMILY MEDICINE

## 2022-10-19 NOTE — ASSESSMENT & PLAN NOTE
He is currently in normal sinus rhythm and denies any palpitations.  We will continue Eliquis for anticoagulation.  Continue metoprolol for rate and rhythm management.

## 2022-10-19 NOTE — ASSESSMENT & PLAN NOTE
Is near euvolemic on physical examination.  Mild edema still persisting.  Recent labs showed normal proBNP.  Self-catheterization is helping him a lot.  Recommend to continue Bumex 2 mg daily

## 2022-10-19 NOTE — OUTREACH NOTE
AMBULATORY CASE MANAGEMENT NOTE    Name and Relationship of Patient/Support Person:  -     Gómez/chaim came in today to bring medications to be filled.  Filled 3 weeks of medications for Gmóez.  He received the flu shot while he was here today.  Saw Dr. Ware and no changes, 6 month follow up.       ALISSON GOMEZ  Ambulatory Case Management    10/19/2022, 14:14 EDT

## 2022-10-19 NOTE — PROGRESS NOTES
CARDIOLOGY FOLLOW-UP PROGRESS NOTE        Chief Complaint  Follow-up, Shortness of Breath, Congestive Heart Failure, and Atrial Fibrillation    Subjective            Preston Wallis presents to [facility] CARDIOLOGY  History of Present Illness      Mr. Wallis is here for routine 3-month follow-up visit.  Overall he feels fine and at his baseline.  He has not had a hospital admission in the past 6 months.  He is following a low-sodium diet and fluid restriction.  He is taking Bumex as prescribed.  Pedal edema is better.  Denies having any chest pain or palpitations.  He is still not able to walk but just started working with physical therapy.  He continues to do self catheterizations.      Past History:    Chronic diastolic heart failure  Paroxysmal atrial fibrillation  Chronic kidney disease  COPD  Hypertension  Sleep apnea  Diabetes mellitus  Neurogenic bladder, currently doing intermittent self catheterizations    Medical History:  Past Medical History:   Diagnosis Date   • Age-related cognitive decline    • Allergic contact dermatitis    • Allergies    • Anemia    • Bronchiectasis with acute lower respiratory infection (Formerly [facility])    • Chronic diastolic (congestive) heart failure (Formerly [facility])    • Chronic kidney disease    • Chronic respiratory failure with hypoxia (Former[facility])    • COPD (chronic obstructive pulmonary disease) (Formerly [facility])    • Dependence on supplemental oxygen    • Eczema    • Erectile dysfunction     due to organic reasons   • Essential (primary) hypertension    • Fracture     closed fracture of other tarsal and metatarsal bones   • GERD without esophagitis    • High risk medication use    • Hypercholesteremia    • Hypomagnesemia    • Insomnia    • Low back pain    • Major depressive disorder    • Morbid (severe) obesity due to excess calories (Formerly [facility])    • MRSA pneumonia ([facility])    • Muscle weakness    • 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 without evidence of  necrosis (HCC)    • Obstructive sleep apnea (adult) (pediatric)    • Other forms of dyspnea    • Other long term (current) drug therapy    • Other specified noninfective gastroenteritis and colitis    • Other spondylosis, lumbar region    • Pain in both knees    • Paroxysmal atrial fibrillation (HCC)    • Peripheral neuropathy     attributed to type 2 diabetes   • Pneumonia, unspecified organism    • Polyneuropathy    • Rash and other nonspecific skin eruption    • Syncope and collapse    • Tachycardia    • Type 1 diabetes mellitus with diabetic chronic kidney disease (HCC)    • Type 2 diabetes mellitus (HCC)    • Unspecified fall, initial encounter    • Urinary retention        Surgical History: has a past surgical history that includes Cholecystectomy; tonsillectomy and adenoidectomy; Knee surgery (Left); Other surgical history (Left); and Cystoscopy.     Family History: family history includes Asthma in his father; Cancer in his sister; Coronary artery disease in his mother; Diabetes type II in his mother; Hypertension in his mother.     Social History: reports that he quit smoking about 29 years ago. His smoking use included cigarettes. He started smoking about 41 years ago. He has a 12.00 pack-year smoking history. He has never used smokeless tobacco. He reports that he does not currently use alcohol. He reports that he does not use drugs.    Allergies: Benadryl [diphenhydramine] and Proventil [albuterol]    Current Outpatient Medications on File Prior to Visit   Medication Sig   • albuterol sulfate  (90 Base) MCG/ACT inhaler Inhale 2 puffs 4 (Four) Times a Day As Needed for Wheezing.   • apixaban (Eliquis) 5 MG tablet tablet Take 1 tablet by mouth Every 12 (Twelve) Hours.   • atorvastatin (LIPITOR) 20 MG tablet Take 1 tablet by mouth Every Night.   • B-D UF III MINI PEN NEEDLES 31G X 5 MM misc USE TO INJECT insulin AS DIRECTED   • bumetanide (BUMEX) 2 MG tablet Take 1 tablet twice daily .  Call  cardiology  if weight is great then 2 pounds in one day or 5 pounds in a week   • Bydureon BCise 2 MG/0.85ML auto-injector injection inject 2mg (one pen) UNDER THE SKIN into THE appropriate AREA AS DIRECTED one time PER WEEK   • calcium citrate (CALCITRATE) 950 (200 Ca) MG tablet Take 1 tablet by mouth Daily.   • Cholecalciferol (Vitamin D3) 50 MCG (2000 UT) tablet Take 1 tablet BY MOUTH EVERY DAY   • dapagliflozin (Farxiga) 5 MG tablet tablet Take 1 tablet by mouth Daily.   • Dextromethorphan-guaiFENesin (Mucus Relief DM)  MG tablet sustained-release 12 hour Take 1 tablet BY MOUTH TWICE DAILY AS NEEDED FOR congestion   • docusate sodium (COLACE) 100 MG capsule Take 1 capsule by mouth 2 (Two) Times a Day.   • DULoxetine (CYMBALTA) 60 MG capsule Take 1 capsule by mouth 2 (Two) Times a Day.   • famotidine (PEPCID) 40 MG tablet Take 1 tablet by mouth Daily.   • ferrous gluconate (FERGON) 324 MG tablet Take 1 tablet by mouth Daily With Breakfast.   • finasteride (PROSCAR) 5 MG tablet Take 1 tablet by mouth Daily.   • folic acid (FOLVITE) 1 MG tablet Take 1 tablet by mouth Daily.   • gabapentin (NEURONTIN) 400 MG capsule Take 1 capsule by mouth Every Night.   • glucose blood test strip 1 each by Other route 3 (Three) Times a Day. Check blood sugar each meal 3 times daily     Dx:   E11.40   • heparin 100 UNIT/ML solution injection 5 mL by Intracatheter route Every 30 (Thirty) Days.   • hydrALAZINE (APRESOLINE) 50 MG tablet Take 1 tablet by mouth 2 (Two) Times a Day.   • Insulin Lispro (ADMELOG SOLOSTAR) 100 UNIT/ML injection pen Inject 8 Units under the skin into the appropriate area as directed 3 (Three) Times a Day. Per SSI, if BS <160 = 0 units, 161-220= 2 unit, 221-280= 4units, 281-340= 6units, 341-400= 8units  Dx: E11.40   • isosorbide mononitrate (IMDUR) 30 MG 24 hr tablet Take 1 tablet by mouth Daily.   • Lantus SoloStar 100 UNIT/ML injection pen INJECT 40 UNITS UNDER THE SKIN ONCE DAILY   • metoprolol  "tartrate (LOPRESSOR) 100 MG tablet Take 1 tablet by mouth 2 (Two) Times a Day.   • montelukast (SINGULAIR) 10 MG tablet Take 1 tablet by mouth Every Night.   • NIFEdipine XL (PROCARDIA XL) 30 MG 24 hr tablet Take 30 mg by mouth Every Morning.   • O2 (OXYGEN) 2 Liter O2 - CONTINUOUS (route: Oxygen)   • povidone-iodine (Betadine) 10 % external solution Apply  topically to the appropriate area as directed As Needed for Wound Care.   • sodium chloride 0.9 % injection Infuse 10 mL into a venous catheter Every 30 (Thirty) Days. Standing order.  Port flush q 30 days - 5mL Heparin   • traMADol (ULTRAM) 50 MG tablet Take 1 tablet by mouth Every 6 (Six) Hours As Needed for Moderate Pain.   • traZODone (DESYREL) 100 MG tablet Take 1 tablet by mouth Every Night.   • TRUEplus Lancets 28G misc USE AS DIRECTED     No current facility-administered medications on file prior to visit.          Review of Systems   Constitutional: Positive for fatigue.   Respiratory: Positive for shortness of breath. Negative for cough and wheezing.    Cardiovascular: Positive for leg swelling. Negative for chest pain and palpitations.   Gastrointestinal: Negative for nausea and vomiting.   Musculoskeletal: Positive for arthralgias.   Neurological: Negative for dizziness and syncope.        Objective     /71   Pulse 84   Ht 175.3 cm (69\")   Wt 122 kg (270 lb)   BMI 39.87 kg/m²       Physical Exam    General : Alert, awake, no acute distress, in a wheelchair  Neck : Supple, no carotid bruit, no jugular venous distention  CVS : Regular rate and rhythm, no murmur, rubs or gallops  Lungs: Clear to auscultation bilaterally, no crackles or rhonchi  Abdomen: Soft, nontender, bowel sounds heard in all 4 quadrants  Extremities: Warm, well-perfused, 1+ edema bilaterally      Result Review :     The following data was reviewed by: Lino Ware MD on 10/19/2022:    CMP    CMP 4/11/22 6/22/22 8/22/22 8/22/22      1317 1317   Glucose 275 (A) 326 (A) " 358 (A)    BUN 62 (A) 24 (A) 31 (A)    Creatinine 1.89 (A) 1.62 (A) 1.63 (A)    Sodium 135 (A) 136 137    Potassium 4.1 4.2 4.3    Chloride 96 (A) 93 (A) 92 (A)    Calcium 9.1 9.3 8.9    Total Protein    7.1   Albumin 3.50 3.80  3.0   Globulin    4.1 (A)   Total Bilirubin 0.2 <0.2     Alkaline Phosphatase 141 (A) 201 (A)     AST (SGOT) 18 19     ALT (SGPT) 20 20     (A) Abnormal value            CBC    CBC 4/11/22 6/22/22 8/22/22   WBC 13.86 (A) 10.63 11.35 (A)   RBC 4.06 (A) 3.71 (A) 3.83 (A)   Hemoglobin 11.2 (A) 10.2 (A) 10.4 (A)   Hematocrit 35.0 (A) 33.2 (A) 34.1 (A)   MCV 86.2 89.5 89.0   MCH 27.6 27.5 27.2   MCHC 32.0 30.7 (A) 30.5 (A)   RDW 12.9 14.8 13.1   Platelets 406 379 337   (A) Abnormal value              Lipid Panel    Lipid Panel 3/17/22   Total Cholesterol 131   Triglycerides 69   HDL Cholesterol 65 (A)   VLDL Cholesterol 14   LDL Cholesterol  52   LDL/HDL Ratio 0.80   (A) Abnormal value                 Data reviewed: Cardiology studies        Results for orders placed during the hospital encounter of 04/05/22    Adult Transthoracic Echo Limited W/ Cont if Necessary Per Protocol    Interpretation Summary  · The aortic root measures 3.1 cm.  · Left ventricular ejection fraction appears to be 56 - 60%.  · Left ventricular diastolic function is consistent with (grade I) impaired relaxation.    There were no apparent intracardiac masses, vegetations or thrombi.                   Assessment and Plan        Diagnoses and all orders for this visit:    1. Chronic diastolic (congestive) heart failure (HCC) (Primary)  Assessment & Plan:  Is near euvolemic on physical examination.  Mild edema still persisting.  Recent labs showed normal proBNP.  Self-catheterization is helping him a lot.  Recommend to continue Bumex 2 mg daily      2. Essential (primary) hypertension  Assessment & Plan:  Blood pressure borderline elevated in the office today, previously better controlled during PCP visits.  We will continue  the same regimen including nifedipine, metoprolol, isosorbide, hydralazine.  Counseled again regarding low-sodium diet and fluid restriction.  Patient verbalized understanding.      3. Paroxysmal atrial fibrillation (HCC)  Assessment & Plan:  He is currently in normal sinus rhythm and denies any palpitations.  We will continue Eliquis for anticoagulation.  Continue metoprolol for rate and rhythm management.              Follow Up     Return in about 6 months (around 4/19/2023) for Next scheduled follow up.    Patient was given instructions and counseling regarding his condition or for health maintenance advice. Please see specific information pulled into the AVS if appropriate.

## 2022-10-19 NOTE — ASSESSMENT & PLAN NOTE
Blood pressure borderline elevated in the office today, previously better controlled during PCP visits.  We will continue the same regimen including nifedipine, metoprolol, isosorbide, hydralazine.  Counseled again regarding low-sodium diet and fluid restriction.  Patient verbalized understanding.

## 2022-10-26 ENCOUNTER — PATIENT OUTREACH (OUTPATIENT)
Dept: CASE MANAGEMENT | Facility: OTHER | Age: 57
End: 2022-10-26

## 2022-10-26 DIAGNOSIS — I50.32 CHRONIC DIASTOLIC (CONGESTIVE) HEART FAILURE: ICD-10-CM

## 2022-10-26 DIAGNOSIS — D50.8 IRON DEFICIENCY ANEMIA SECONDARY TO INADEQUATE DIETARY IRON INTAKE: Primary | ICD-10-CM

## 2022-10-26 DIAGNOSIS — I87.2 VENOUS INSUFFICIENCY (CHRONIC) (PERIPHERAL): ICD-10-CM

## 2022-10-26 DIAGNOSIS — N18.32 STAGE 3B CHRONIC KIDNEY DISEASE (CKD): ICD-10-CM

## 2022-10-26 DIAGNOSIS — E11.65 UNCONTROLLED TYPE 2 DIABETES MELLITUS WITH HYPERGLYCEMIA, WITH LONG-TERM CURRENT USE OF INSULIN: ICD-10-CM

## 2022-10-26 DIAGNOSIS — Z79.4 UNCONTROLLED TYPE 2 DIABETES MELLITUS WITH HYPERGLYCEMIA, WITH LONG-TERM CURRENT USE OF INSULIN: ICD-10-CM

## 2022-10-26 NOTE — OUTREACH NOTE
[facility] End of Month Documentation    This Chronic Medical Management Care Plan for Preston Wallis, 57 y.o. male, has been monitored and managed; reviewed and a new plan of care implemented for the month of October.  A cumulative time of 40  minutes was spent on this patient record this month, including phone call with care giver; electronic communication with other providers; electronic communication with primary care provider; electronic communication with pharmacist; chart review; phone call with pharmacist.    Regarding the patient's problems: has Chronic cough; Type 1 diabetes mellitus with diabetic chronic kidney disease (HCC); Polyneuropathy; Peripheral neuropathy; Paroxysmal atrial fibrillation ([facility]); MILADY (obstructive sleep apnea); MRSA pneumonia ([facility]); Low back pain; Insomnia; Essential (primary) hypertension; Chronic diastolic (congestive) heart failure ([facility]); Allergies; COPD (chronic obstructive pulmonary disease) ([facility]); Chronic anticoagulation; Benign prostatic hyperplasia; Doyle catheter in place; Microscopic hematuria; Impaired mobility and endurance; Stage 3b chronic kidney disease (CKD) ([facility]); Iron deficiency anemia secondary to inadequate dietary iron intake; Vitamin D deficiency due to chronic kidney disease; Class 3 severe obesity with serious comorbidity in adult ([facility]); Lower extremity edema; Elevated alkaline phosphatase level; Venous insufficiency (chronic) (peripheral); Tobacco abuse, in remission; History of Pseudomonas pneumonia; Chronic dyspnea; Gastroesophageal reflux disease; Bronchiectasis without complication (HCC); Wheezing; Poorly controlled type 2 diabetes mellitus with neuropathy ([facility]); and Type 2 DM with CKD stage 3 and hypertension ([facility]) on their problem list., the following items were addressed: medications; transitions to medical care; medical records; referrals to community service providers and any changes can be found within the plan section of the note.  A detailed listing of time  spent for chronic care management is tracked within each outreach encounter.  Current medications include:  has a current medication list which includes the following prescription(s): albuterol sulfate hfa, apixaban, atorvastatin, b-d uf iii mini pen needles, bumetanide, bydureon bcise, calcium citrate, vitamin d3, farxiga, mucus relief dm, docusate sodium, duloxetine, famotidine, ferrous gluconate, finasteride, folic acid, gabapentin, glucose blood, heparin, hydralazine, insulin lispro, isosorbide mononitrate, lantus solostar, metoprolol tartrate, montelukast, nifedipine xl, o2, povidone-iodine, sodium chloride, tramadol, trazodone, and trueplus lancets 28g. and the patient is reported to be caregiver will take responsibility for med compliance,  Medications are reported to be non-effective in controlling symptoms and changes have been made to the medication protocol.  Regarding these diagnoses, referrals were made to the following provider(s):  specialists.  All notes on chart for PCP to review.    The patient was monitored remotely for pain; medications.    The patient's physical needs include:  help taking medications as prescribed; medication education; needs assistance with ADLs; physical healthcare; resources for disability needs; physician referral.     The patient's mental support needs include:  continued support    The patient's cognitive support needs include:  medication; continued support; needs assistance with ADLs    The patient's psychosocial support needs include:  continued support; coordination of community providers; medication management or adherence    The patient's functional needs include: health care coverage; medication education; needs assistance for ADLs; physical healthcare; physician referral; resources for disability needs    The patient's environmental needs include:  resources for disability needs    Care Plan overall comments:  Still not at goal, however improving. will continue to  follow.    Refer to previous outreach notes for more information on the areas listed above.    Monthly Billing Diagnoses  (D50.8) Iron deficiency anemia secondary to inadequate dietary iron intake    (I50.32) Chronic diastolic (congestive) heart failure (HCC)    (E11.65,  Z79.4) Uncontrolled type 2 diabetes mellitus with hyperglycemia, with long-term current use of insulin (HCC)    (N18.32) Stage 3b chronic kidney disease (CKD) (HCC)    (I87.2) Venous insufficiency (chronic) (peripheral)    Medications   · Medications have been reconciled    Care Plan progress this month:      Recently Modified Care Plans Updates made since 9/25/2022 12:00 AM    No recently modified care plans.            Instructions   · Patient was provided an electronic copy of care plan  · CCM services were explained and offered and patient has accepted these services.  · Patient has given their written consent to receive CCM services and understands that this includes the authorization of electronic communication of medical information with the other treating providers.  · Patient understands that they may stop CCM services at any time and these changes will be effective at the end of the calendar month and will effectively revocate the agreement of CCM services.  · Patient understands that only one practitioner can furnish and be paid for CCM services during one calendar month.  Patient also understands that there may be co-payment or deductible fees in association with CCM services.  · Patient will continue with at least monthly follow-up calls with the Ambulatory .    ALISSON GOMEZ  Ambulatory Case Management    10/26/2022, 11:02 EDT

## 2022-11-02 ENCOUNTER — PATIENT OUTREACH (OUTPATIENT)
Dept: CASE MANAGEMENT | Facility: OTHER | Age: 57
End: 2022-11-02

## 2022-11-02 DIAGNOSIS — I50.32 CHRONIC DIASTOLIC (CONGESTIVE) HEART FAILURE: Primary | ICD-10-CM

## 2022-11-02 NOTE — OUTREACH NOTE
"AMBULATORY CASE MANAGEMENT NOTE    Name and Relationship of Patient/Support Person: Fanny Farmer, [facility]    Fanny called to state that she went out today to [facility].  She states that \"Gómez did not look good\".  He is complaining of coughing up thick yellow, sometimes brown sputum.    Fanny states he looks as if he as swelling in his abdomen.  Also increasing shortness of air.  Gómez says that \"he just feels swollen\"    Elizabeth is continuing to cath him, but there is a insurance problem stating over usage - 4 x day is allowed and she was cathing every 4 hours.   Saw nephrology last week -   Has appt tomorrow with PCP      ALISSON GOMEZ  Ambulatory Case Management    11/2/2022, 15:01 EDT  "

## 2022-11-03 ENCOUNTER — TRANSCRIBE ORDERS (OUTPATIENT)
Dept: LAB | Facility: HOSPITAL | Age: 57
End: 2022-11-03

## 2022-11-03 ENCOUNTER — LAB (OUTPATIENT)
Dept: LAB | Facility: HOSPITAL | Age: 57
End: 2022-11-03

## 2022-11-03 ENCOUNTER — OFFICE VISIT (OUTPATIENT)
Dept: FAMILY MEDICINE CLINIC | Age: 57
End: 2022-11-03

## 2022-11-03 ENCOUNTER — PATIENT OUTREACH (OUTPATIENT)
Dept: CASE MANAGEMENT | Facility: OTHER | Age: 57
End: 2022-11-03

## 2022-11-03 VITALS
DIASTOLIC BLOOD PRESSURE: 74 MMHG | HEART RATE: 78 BPM | SYSTOLIC BLOOD PRESSURE: 144 MMHG | HEIGHT: 69 IN | WEIGHT: 271 LBS | BODY MASS INDEX: 40.14 KG/M2 | OXYGEN SATURATION: 95 %

## 2022-11-03 DIAGNOSIS — M54.42 CHRONIC BILATERAL LOW BACK PAIN WITH BILATERAL SCIATICA: ICD-10-CM

## 2022-11-03 DIAGNOSIS — I12.9 TYPE 2 DM WITH CKD STAGE 3 AND HYPERTENSION: Primary | ICD-10-CM

## 2022-11-03 DIAGNOSIS — E11.22 TYPE 2 DM WITH CKD STAGE 3 AND HYPERTENSION: Primary | ICD-10-CM

## 2022-11-03 DIAGNOSIS — N18.32 STAGE 3B CHRONIC KIDNEY DISEASE (CKD): ICD-10-CM

## 2022-11-03 DIAGNOSIS — Z87.01 HISTORY OF PSEUDOMONAS PNEUMONIA: ICD-10-CM

## 2022-11-03 DIAGNOSIS — E11.40 POORLY CONTROLLED TYPE 2 DIABETES MELLITUS WITH NEUROPATHY: Primary | ICD-10-CM

## 2022-11-03 DIAGNOSIS — G89.29 CHRONIC BILATERAL LOW BACK PAIN WITH BILATERAL SCIATICA: ICD-10-CM

## 2022-11-03 DIAGNOSIS — I48.0 PAROXYSMAL ATRIAL FIBRILLATION: ICD-10-CM

## 2022-11-03 DIAGNOSIS — N18.31 CHRONIC KIDNEY DISEASE (CKD) STAGE G3A/A1, MODERATELY DECREASED GLOMERULAR FILTRATION RATE (GFR) BETWEEN 45-59 ML/MIN/1.73 SQUARE METER AND ALBUMINURIA CREATININE RATIO LESS THAN 30 MG/G (CMS/H*: ICD-10-CM

## 2022-11-03 DIAGNOSIS — J44.9 CHRONIC OBSTRUCTIVE PULMONARY DISEASE, UNSPECIFIED COPD TYPE: ICD-10-CM

## 2022-11-03 DIAGNOSIS — I50.32 CHRONIC DIASTOLIC (CONGESTIVE) HEART FAILURE: ICD-10-CM

## 2022-11-03 DIAGNOSIS — G62.9 PERIPHERAL POLYNEUROPATHY: ICD-10-CM

## 2022-11-03 DIAGNOSIS — Z79.899 HIGH RISK MEDICATION USE: ICD-10-CM

## 2022-11-03 DIAGNOSIS — E11.65 POORLY CONTROLLED TYPE 2 DIABETES MELLITUS WITH NEUROPATHY: Primary | ICD-10-CM

## 2022-11-03 DIAGNOSIS — F51.01 PRIMARY INSOMNIA: ICD-10-CM

## 2022-11-03 DIAGNOSIS — M54.41 CHRONIC BILATERAL LOW BACK PAIN WITH BILATERAL SCIATICA: ICD-10-CM

## 2022-11-03 DIAGNOSIS — N18.31 CHRONIC KIDNEY DISEASE (CKD) STAGE G3A/A1, MODERATELY DECREASED GLOMERULAR FILTRATION RATE (GFR) BETWEEN 45-59 ML/MIN/1.73 SQUARE METER AND ALBUMINURIA CREATININE RATIO LESS THAN 30 MG/G (CMS/H*: Primary | ICD-10-CM

## 2022-11-03 DIAGNOSIS — I10 ESSENTIAL (PRIMARY) HYPERTENSION: ICD-10-CM

## 2022-11-03 DIAGNOSIS — N18.30 TYPE 2 DM WITH CKD STAGE 3 AND HYPERTENSION: Primary | ICD-10-CM

## 2022-11-03 PROCEDURE — 80048 BASIC METABOLIC PNL TOTAL CA: CPT

## 2022-11-03 PROCEDURE — 99214 OFFICE O/P EST MOD 30 MIN: CPT | Performed by: FAMILY MEDICINE

## 2022-11-03 PROCEDURE — 36415 COLL VENOUS BLD VENIPUNCTURE: CPT

## 2022-11-03 PROCEDURE — 80305 DRUG TEST PRSMV DIR OPT OBS: CPT | Performed by: FAMILY MEDICINE

## 2022-11-03 RX ORDER — LOSARTAN POTASSIUM 50 MG/1
50 TABLET ORAL DAILY
COMMUNITY
End: 2023-01-29 | Stop reason: SDUPTHER

## 2022-11-03 RX ORDER — GABAPENTIN 300 MG/1
CAPSULE ORAL
Qty: 90 CAPSULE | Refills: 2 | Status: SHIPPED | OUTPATIENT
Start: 2022-11-03 | End: 2023-01-29 | Stop reason: SDUPTHER

## 2022-11-03 NOTE — PROGRESS NOTES
Time he was seen today and he was in good shape and his abdomen was soft and nontender and no fluid wave appreciated.  Dr. Riley

## 2022-11-03 NOTE — OUTREACH NOTE
AMBULATORY CASE MANAGEMENT NOTE    Name and Relationship of Patient/Support Person:  -     Gómez and Elizabeth were in the office today.  Gómez reports he was doing better since the call yesterday from home health.  Only changes today were to stop Trazadone, adding some Magnesium.Melatonin at night. Also gave Tramadol for pain.        ALISSON GOMEZ  Ambulatory Case Management    11/3/2022, 13:41 EDT

## 2022-11-03 NOTE — PROGRESS NOTES
"Preston Wallis presents to [facility].    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  Insurance will not pay for more than 4 cath today.  He is on a water pill and this may be causing increased pelvic swelling since caths only 4 times daily.  In addition he saw his nephrologist Dr. Long who started him on losartan which is a new medication.  He sees urology Dr White. Part of the issue is he tries to urinate on his own at night to give his wife a break and isnt getting all the urine out so he is more swollen in am.  He is on trazodone at night  For sleep and \"it is not working\" and given his urinary hesitancy I recommend we stop this med and try flomax, but we will not try flomax b/c he has been on this in past.       In addition he has new diagnosis of esophageal spasm and recently has been placed on isosorbide    His L leg has an open sore that is finally healing     Gómez has congestive heart failure with preserved ejection fraction, diastolic dysfunction (in addition he has chronic lower extremity edema).  He is stable on current meds.  He is on bumex.  He is working closely with our care coordinator nurse Sima.  He is to continue to avoid salt in his diet    He is on positive pressure CPAP for obstructive sleep apnea at home and he states he is compliant    He also has low iron with iron deficiency anemia and sees hematology Dr. Hanson.  He had 2 iron infusions while in the hospital in May.  Colonoscopy and EGD pending with Dr Toro dasilva for Sept 8th     Patient presents with type 2 diabetes mellitus, insulin dependent,  with diabetic chronic kidney disease and peripheral neuropathy.  Compliance with treatment has been poor; he skips " some insulin doses due to forgetfulness and inconvenience of dosing and does not follow a diet and exercise regimen.    Tobacco screen: Non-smoker.  Current meds include insulin/injectable ( SSI, lantus 38 units nightly ), bydureon and farxiga.  He does not perform home blood glucose monitoring-he states they are [id number] wife states he is only checking his BS twice a day and his blood sugars have been running higher and not under great control so he is now started on a sliding scale insulin and his care coordinator nurse Sima Rosado is working with him to make sure he takes his medications appropriately.  In regard to preventative care, his last ophthalmology exam was in 4/2020 per patient memory          Concerning essential (primary) hypertension, his current cardiac medication regimen includes a diuretic (bumex), a beta-blocker ( Metoprolol 100 mg twice daily ), CCB (procardia), and hydralazine.  Compliance  Has been good with pill packs.  He is tolerating the medication well without side effects.  He has not kept a blood pressure diary, but states that pressures have been okay.       Recurrent hospitalization for pneumonia (most commonly pseudomonas in past), and he sees Dr Chavez and is on intermittent tobramycin nebulizer.  He is also on albuterol/duo nebs and symbicort daily, albuterol nebulizers.  Tolerates medications well.  No acute issues today     H/o PE and is stable on eliquis      H/o afib and is in NSR, stable on BB and eliquis    PN pain is chronic and ongoing, he is on gabapentin for this and we will plan RLS gabapentin.      He is seeing Dr Chi for his severe chronic L knee pain        Review of Systems:  Review of Systems   Constitutional: Positive for fatigue. Negative for chills and fever.   HENT: Negative for congestion, ear discharge and sore throat.    Respiratory: Positive for wheezing. Negative for cough and shortness of breath.    Cardiovascular: Negative for chest pain,  palpitations and leg swelling.   Gastrointestinal: Negative for abdominal pain, constipation, diarrhea, nausea, vomiting and GERD.   Genitourinary: Negative for flank pain.   Skin: Positive for wound. Negative for rash.   Neurological: Negative for dizziness and headache.   Psychiatric/Behavioral: Negative for sleep disturbance and depressed mood. The patient is not nervous/anxious.         Objective   Medical History:  Past Medical History:   • Age-related cognitive decline   • Allergic contact dermatitis   • Allergies   • Anemia   • Bronchiectasis with acute lower respiratory infection ([facility])   • Chronic diastolic (congestive) heart failure ([facility])   • Chronic kidney disease   • Chronic respiratory failure with hypoxia ([facility])   • COPD (chronic obstructive pulmonary disease) ([facility])   • Dependence on supplemental oxygen   • Eczema   • Erectile dysfunction    due to organic reasons   • Essential (primary) hypertension   • Fracture    closed fracture of other tarsal and metatarsal bones   • GERD without esophagitis   • High risk medication use   • Hypercholesteremia   • Hypomagnesemia   • Insomnia   • Low back pain   • Major depressive disorder   • Morbid (severe) obesity due to excess calories ([facility])   • MRSA pneumonia ([facility])   • Muscle weakness   • 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 without evidence of necrosis ([facility])   • Obstructive sleep apnea (adult) (pediatric)   • Other forms of dyspnea   • Other long term (current) drug therapy   • Other specified noninfective gastroenteritis and colitis   • Other spondylosis, lumbar region   • Pain in both knees   • Paroxysmal atrial fibrillation ([facility])   • Peripheral neuropathy    attributed to type 2 diabetes   • Pneumonia, unspecified organism   • Polyneuropathy   • Rash and other nonspecific skin eruption   • Syncope and collapse   • Tachycardia   • Type 1 diabetes mellitus with diabetic chronic kidney disease ([facility])   • Type 2  diabetes mellitus (HCC)   • Unspecified fall, initial encounter   • Urinary retention     Past Surgical History:   • CHOLECYSTECTOMY   • CYSTOSCOPY   • KNEE SURGERY   • OTHER SURGICAL HISTORY    venous port   • TONSILLECTOMY AND ADENOIDECTOMY      Family History   Problem Relation Age of Onset   • Coronary artery disease Mother    • Hypertension Mother    • Diabetes type II Mother    • Asthma Father    • Cancer Sister      Social History     Tobacco Use   • Smoking status: Former     Packs/day: 1.00     Years: 12.00     Pack years: 12.00     Types: Cigarettes     Start date:      Quit date:      Years since quittin.8   • Smokeless tobacco: Never   Substance Use Topics   • Alcohol use: Not Currently       Health Maintenance Due   Topic Date Due   • COVID-19 Vaccine (1) Never done   • ANNUAL PHYSICAL  Never done   • ZOSTER VACCINE (1 of 2) Never done   • DIABETIC EYE EXAM  2021   • URINE MICROALBUMIN  2022        Immunization History   Administered Date(s) Administered   • Flu Vaccine Quad PF >36MO 10/18/2016, 10/16/2017, 2019   • Flu Vaccine Split Quad 2019   • FluLaval/Fluzone >6mos 10/19/2022   • Influenza Injectable Mdck Pf Quad 10/19/2022   • Influenza Quad Vaccine (Inpatient) 2013   • Influenza, Unspecified 2020   • Pneumococcal Polysaccharide (PPSV23) 1997   • Tdap 2018       Allergies   Allergen Reactions   • Benadryl [Diphenhydramine] Itching   • Proventil [Albuterol] Other (See Comments)     Mouth sores          Medications:  Current Outpatient Medications on File Prior to Visit   Medication Sig   • albuterol sulfate  (90 Base) MCG/ACT inhaler Inhale 2 puffs 4 (Four) Times a Day As Needed for Wheezing.   • apixaban (Eliquis) 5 MG tablet tablet Take 1 tablet by mouth Every 12 (Twelve) Hours.   • atorvastatin (LIPITOR) 20 MG tablet Take 1 tablet by mouth Every Night.   • B-D UF III MINI PEN NEEDLES 31G X 5 MM misc USE TO INJECT insulin AS  DIRECTED   • bumetanide (BUMEX) 2 MG tablet Take 1 tablet twice daily .  Call cardiology  if weight is great then 2 pounds in one day or 5 pounds in a week   • Bydureon BCise 2 MG/0.85ML auto-injector injection inject 2mg (one pen) UNDER THE SKIN into THE appropriate AREA AS DIRECTED one time PER WEEK   • calcium citrate (CALCITRATE) 950 (200 Ca) MG tablet Take 1 tablet by mouth Daily.   • Cholecalciferol (Vitamin D3) 50 MCG (2000 UT) tablet Take 1 tablet BY MOUTH EVERY DAY   • dapagliflozin (Farxiga) 5 MG tablet tablet Take 1 tablet by mouth Daily.   • Dextromethorphan-guaiFENesin (Mucus Relief DM)  MG tablet sustained-release 12 hour Take 1 tablet BY MOUTH TWICE DAILY AS NEEDED FOR congestion   • docusate sodium (COLACE) 100 MG capsule Take 1 capsule by mouth 2 (Two) Times a Day.   • DULoxetine (CYMBALTA) 60 MG capsule Take 1 capsule by mouth 2 (Two) Times a Day.   • famotidine (PEPCID) 40 MG tablet Take 1 tablet by mouth Daily.   • ferrous gluconate (FERGON) 324 MG tablet Take 1 tablet by mouth Daily With Breakfast.   • finasteride (PROSCAR) 5 MG tablet Take 1 tablet by mouth Daily.   • folic acid (FOLVITE) 1 MG tablet Take 1 tablet by mouth Daily.   • glucose blood test strip 1 each by Other route 3 (Three) Times a Day. Check blood sugar each meal 3 times daily     Dx:   E11.40   • heparin 100 UNIT/ML solution injection 5 mL by Intracatheter route Every 30 (Thirty) Days.   • hydrALAZINE (APRESOLINE) 50 MG tablet Take 1 tablet by mouth 2 (Two) Times a Day.   • Insulin Lispro (ADMELOG SOLOSTAR) 100 UNIT/ML injection pen Inject 8 Units under the skin into the appropriate area as directed 3 (Three) Times a Day. Per SSI, if BS <160 = 0 units, 161-220= 2 unit, 221-280= 4units, 281-340= 6units, 341-400= 8units  Dx: E11.40   • isosorbide mononitrate (IMDUR) 30 MG 24 hr tablet Take 1 tablet by mouth Daily.   • Lantus SoloStar 100 UNIT/ML injection pen INJECT 40 UNITS UNDER THE SKIN ONCE DAILY   • losartan (COZAAR)  "50 MG tablet Take 1 tablet by mouth Daily.   • metoprolol tartrate (LOPRESSOR) 100 MG tablet Take 1 tablet by mouth 2 (Two) Times a Day.   • montelukast (SINGULAIR) 10 MG tablet Take 1 tablet by mouth Every Night.   • NIFEdipine XL (PROCARDIA XL) 30 MG 24 hr tablet Take 30 mg by mouth Every Morning.   • O2 (OXYGEN) 2 Liter O2 - CONTINUOUS (route: Oxygen)   • povidone-iodine (Betadine) 10 % external solution Apply  topically to the appropriate area as directed As Needed for Wound Care.   • sodium chloride 0.9 % injection Infuse 10 mL into a venous catheter Every 30 (Thirty) Days. Standing order.  Port flush q 30 days - 5mL Heparin   • traMADol (ULTRAM) 50 MG tablet Take 1 tablet by mouth Every 6 (Six) Hours As Needed for Moderate Pain.   • TRUEplus Lancets 28G misc USE AS DIRECTED   • [DISCONTINUED] gabapentin (NEURONTIN) 400 MG capsule Take 1 capsule by mouth Every Night.   • [DISCONTINUED] traZODone (DESYREL) 100 MG tablet Take 1 tablet by mouth Every Night.     No current facility-administered medications on file prior to visit.       Vital Signs:   /74 (BP Location: Left arm, Patient Position: Sitting, Cuff Size: Large Adult)   Pulse 78   Ht 175.3 cm (69.02\")   Wt 123 kg (271 lb)   SpO2 95%   BMI 40.00 kg/m²       Physical Exam:  Physical Exam  Vitals and nursing note reviewed.   Constitutional:       General: He is not in acute distress.     Appearance: Normal appearance. He is not ill-appearing, toxic-appearing or diaphoretic.   HENT:      Head: Normocephalic and atraumatic.      Right Ear: Tympanic membrane, ear canal and external ear normal.      Left Ear: Tympanic membrane, ear canal and external ear normal.      Nose: No congestion or rhinorrhea.      Mouth/Throat:      Mouth: Mucous membranes are moist.      Pharynx: Oropharynx is clear. No oropharyngeal exudate or posterior oropharyngeal erythema.   Eyes:      Extraocular Movements: Extraocular movements intact.      Conjunctiva/sclera: " Conjunctivae normal.      Pupils: Pupils are equal, round, and reactive to light.   Cardiovascular:      Rate and Rhythm: Normal rate and regular rhythm.      Pulses:           Dorsalis pedis pulses are 2+ on the right side and 2+ on the left side.      Heart sounds: Normal heart sounds.   Pulmonary:      Effort: Pulmonary effort is normal.      Breath sounds: Normal breath sounds. No wheezing, rhonchi or rales.   Abdominal:      General: Abdomen is flat.      Palpations: Abdomen is soft.   Musculoskeletal:      Cervical back: Neck supple. No rigidity.   Feet:      Right foot:      Protective Sensation: 7 sites tested. 7 sites sensed.      Skin integrity: Skin integrity normal. No ulcer or blister.      Toenail Condition: Right toenails are normal.      Left foot:      Protective Sensation: 7 sites tested. 7 sites sensed.      Skin integrity: Skin integrity normal. No ulcer or blister.      Toenail Condition: Left toenails are normal.      Comments: Diabetic Foot Exam Performed and Monofilament Test Performed     Lymphadenopathy:      Cervical: No cervical adenopathy.   Skin:     General: Skin is warm and dry.          Neurological:      Mental Status: He is alert and oriented to person, place, and time.   Psychiatric:         Mood and Affect: Mood normal.         Behavior: Behavior normal.         Result Review      The following data was reviewed by Kimmy Riley MD on 06/30/2022.  Lab Results   Component Value Date    WBC 11.35 (H) 08/22/2022    HGB 10.4 (L) 08/22/2022    HCT 34.1 (L) 08/22/2022    MCV 89.0 08/22/2022     08/22/2022     Lab Results   Component Value Date    GLUCOSE 358 (H) 08/22/2022    BUN 31 (H) 08/22/2022    CREATININE 1.63 (H) 08/22/2022    EGFRIFNONA 46 (L) 07/27/2021    BCR 19.0 08/22/2022    K 4.3 08/22/2022    CO2 33.6 (H) 08/22/2022    CALCIUM 8.9 08/22/2022    PROTENTOTREF 7.1 08/22/2022    ALBUMIN 3.0 08/22/2022    LABIL2 0.8 08/22/2022    AST 19 06/22/2022    ALT 20  06/22/2022     Lab Results   Component Value Date    CHOL 131 03/17/2022    CHLPL 165 08/26/2020    TRIG 69 03/17/2022    HDL 65 (H) 03/17/2022    LDL 52 03/17/2022     Lab Results   Component Value Date    TSH 0.580 03/17/2020     Lab Results   Component Value Date    HGBA1C 9.10 (H) 10/05/2022     Lab Results   Component Value Date    PSA 0.725 03/17/2022                         Assessment and Plan:          Diagnoses and all orders for this visit:    1. Type 2 DM with CKD stage 3 and hypertension (HCC) (Primary)  Comments:  Worse.  We will start him on sliding scale insulin with meals and I have his nurse care coordinator he will follow-up on his blood sugar readings    2. Stage 3b chronic kidney disease (CKD) (HCC)  Comments:  Stable.  He is to avoid NSAIDs and push fluids 64 ounces a day newly on ARB to help protect kidneys by his nephrologist    3. Primary insomnia  Comments:  stop trazodone and try melatonin with magnesium  Orders:  -     Melatonin-Magnesium Citrate 1-71.5 MG tablet; Take 1 tablet by mouth Every Night.  Dispense: 90 tablet; Refill: 1    4. High risk medication use  -     POC Urine Drug Screen [facility]    5. Paroxysmal atrial fibrillation (HCC)  Comments:  Stable.  Continue beta-blocker and Eliquis and follow-up with cardiology as directed    6. Chronic bilateral low back pain with bilateral sciatica  Comments:  We will increase gabapentin to 300 mg in the a.m. and 600 mg nightly    7. Essential (primary) hypertension    8. Chronic obstructive pulmonary disease, unspecified COPD type ([facility])  Comments:  Stable on current pulmonary toilet.  He is to follow-up with his pulmonologist as directed and continue all meds    9. History of Pseudomonas pneumonia    10. Chronic diastolic (congestive) heart failure (HCC)  Comments:  No acute issues today.  No chest pain/shortness of air or lower extremity swelling    11. Peripheral polyneuropathy  Comments:  Worse.  We will increase gabapentin to 300  mg in the a.m. and 600 mg nightly        Follow Up   Return in about 3 months (around 2/3/2023), or if symptoms worsen or fail to improve, for Recheck.

## 2022-11-04 LAB
ANION GAP SERPL CALCULATED.3IONS-SCNC: 10.7 MMOL/L (ref 5–15)
BUN SERPL-MCNC: 27 MG/DL (ref 6–20)
BUN/CREAT SERPL: 16.5 (ref 7–25)
CALCIUM SPEC-SCNC: 9.2 MG/DL (ref 8.6–10.5)
CHLORIDE SERPL-SCNC: 95 MMOL/L (ref 98–107)
CO2 SERPL-SCNC: 30.3 MMOL/L (ref 22–29)
CREAT SERPL-MCNC: 1.64 MG/DL (ref 0.76–1.27)
EGFRCR SERPLBLD CKD-EPI 2021: 48.5 ML/MIN/1.73
GLUCOSE SERPL-MCNC: 247 MG/DL (ref 65–99)
POTASSIUM SERPL-SCNC: 4.1 MMOL/L (ref 3.5–5.2)
SODIUM SERPL-SCNC: 136 MMOL/L (ref 136–145)

## 2022-11-07 ENCOUNTER — TELEPHONE (OUTPATIENT)
Dept: CASE MANAGEMENT | Facility: OTHER | Age: 57
End: 2022-11-07

## 2022-11-07 NOTE — TELEPHONE ENCOUNTER
Called Elizabeth to let her know working remotely.  She can drop off medications anytime     Since AC is working remotely, instructions given to staff on process.      Also called Dr. Hanson office to see when he needs follow up with them or any?

## 2022-11-16 ENCOUNTER — PATIENT OUTREACH (OUTPATIENT)
Dept: CASE MANAGEMENT | Facility: OTHER | Age: 57
End: 2022-11-16

## 2022-11-16 DIAGNOSIS — E11.65 POORLY CONTROLLED TYPE 2 DIABETES MELLITUS WITH NEUROPATHY: Primary | ICD-10-CM

## 2022-11-16 DIAGNOSIS — E11.40 POORLY CONTROLLED TYPE 2 DIABETES MELLITUS WITH NEUROPATHY: Primary | ICD-10-CM

## 2022-11-16 PROCEDURE — 99439 CHRNC CARE MGMT STAF EA ADDL: CPT | Performed by: FAMILY MEDICINE

## 2022-11-16 PROCEDURE — 99490 CHRNC CARE MGMT STAFF 1ST 20: CPT | Performed by: FAMILY MEDICINE

## 2022-11-16 NOTE — OUTREACH NOTE
AMBULATORY CASE MANAGEMENT NOTE    Name and Relationship of Patient/Support Person: Preston Wallis - Self    Called Gómez to follow up with him regarding the rapid insulin injections pre-meal.  He admits he has not started taking them yet.  He does report being fatigued.  Informed lowering the blood sugar may help some with fatigue.   Reminded of upcoming appointment with ortho, he will bring in his medication planner at that time for me to fill.    Not at goal, will continue to follow.     Education Documentation  No documentation found.        ALISSON GOMEZ  Ambulatory Case Management    11/16/2022, 15:41 EST

## 2022-11-18 DIAGNOSIS — N18.32 STAGE 3B CHRONIC KIDNEY DISEASE (CKD): ICD-10-CM

## 2022-11-18 DIAGNOSIS — F32.4 MAJOR DEPRESSIVE DISORDER WITH SINGLE EPISODE, IN PARTIAL REMISSION: ICD-10-CM

## 2022-11-18 DIAGNOSIS — I10 ESSENTIAL (PRIMARY) HYPERTENSION: ICD-10-CM

## 2022-11-18 DIAGNOSIS — T78.40XS ALLERGY, SEQUELA: Primary | ICD-10-CM

## 2022-11-20 DIAGNOSIS — E11.65 POORLY CONTROLLED TYPE 2 DIABETES MELLITUS WITH NEUROPATHY: Primary | ICD-10-CM

## 2022-11-20 DIAGNOSIS — E11.40 POORLY CONTROLLED TYPE 2 DIABETES MELLITUS WITH NEUROPATHY: Primary | ICD-10-CM

## 2022-11-21 RX ORDER — FOLIC ACID 1 MG/1
TABLET ORAL
Qty: 90 TABLET | Refills: 1 | Status: SHIPPED | OUTPATIENT
Start: 2022-11-21

## 2022-11-21 RX ORDER — DULOXETIN HYDROCHLORIDE 60 MG/1
CAPSULE, DELAYED RELEASE ORAL
Qty: 180 CAPSULE | Refills: 1 | Status: SHIPPED | OUTPATIENT
Start: 2022-11-21

## 2022-11-21 RX ORDER — HYDRALAZINE HYDROCHLORIDE 50 MG/1
TABLET, FILM COATED ORAL
Qty: 180 TABLET | Refills: 1 | Status: SHIPPED | OUTPATIENT
Start: 2022-11-21

## 2022-11-21 RX ORDER — INSULIN GLARGINE 100 [IU]/ML
INJECTION, SOLUTION SUBCUTANEOUS
Qty: 15 ML | Refills: 1 | Status: SHIPPED | OUTPATIENT
Start: 2022-11-21 | End: 2023-02-07

## 2022-11-21 RX ORDER — MONTELUKAST SODIUM 10 MG/1
10 TABLET ORAL NIGHTLY
Qty: 90 TABLET | Refills: 1 | Status: SHIPPED | OUTPATIENT
Start: 2022-11-21

## 2022-11-28 ENCOUNTER — PATIENT OUTREACH (OUTPATIENT)
Dept: CASE MANAGEMENT | Facility: OTHER | Age: 57
End: 2022-11-28

## 2022-11-28 DIAGNOSIS — Z79.4 UNCONTROLLED TYPE 2 DIABETES MELLITUS WITH HYPERGLYCEMIA, WITH LONG-TERM CURRENT USE OF INSULIN: ICD-10-CM

## 2022-11-28 DIAGNOSIS — I10 ESSENTIAL (PRIMARY) HYPERTENSION: ICD-10-CM

## 2022-11-28 DIAGNOSIS — E11.65 UNCONTROLLED TYPE 2 DIABETES MELLITUS WITH HYPERGLYCEMIA, WITH LONG-TERM CURRENT USE OF INSULIN: ICD-10-CM

## 2022-11-28 DIAGNOSIS — I48.0 PAROXYSMAL ATRIAL FIBRILLATION: ICD-10-CM

## 2022-11-28 DIAGNOSIS — K59.09 OTHER CONSTIPATION: ICD-10-CM

## 2022-11-28 DIAGNOSIS — K21.9 GASTROESOPHAGEAL REFLUX DISEASE WITHOUT ESOPHAGITIS: ICD-10-CM

## 2022-11-28 DIAGNOSIS — E78.5 HYPERLIPIDEMIA, UNSPECIFIED HYPERLIPIDEMIA TYPE: ICD-10-CM

## 2022-11-28 DIAGNOSIS — I48.0 PAROXYSMAL ATRIAL FIBRILLATION: Primary | ICD-10-CM

## 2022-11-28 NOTE — OUTREACH NOTE
AMBULATORY CASE MANAGEMENT NOTE    Name and Relationship of Patient/Support Person: Kami Wallis G - Emergency Contact    Kami brought in Gómez's meds to fill.  In need of many refills.  Informed no blood thinner at all to include.  Refills sent to PCP to fill.  Filled 11 days of planner.  Patient had not taken any medication today.      ALISSON R  Ambulatory Case Management    11/28/2022, 17:22 EST   [facility] End of Month Documentation    This Chronic Medical Management Care Plan for Preston Wallis, 57 y.o. male, has been monitored and managed; reviewed and a new plan of care implemented for the month of November.  A cumulative time of 55  minutes was spent on this patient record this month, including phone call with care giver; electronic communication with other providers; electronic communication with primary care provider; electronic communication with pharmacist; chart review; phone call with pharmacist.    Regarding the patient's problems: has Chronic cough; Type 1 diabetes mellitus with diabetic chronic kidney disease ([facility]); Polyneuropathy; Peripheral neuropathy; Paroxysmal atrial fibrillation ([facility]); MILADY (obstructive sleep apnea); MRSA pneumonia ([facility]); Low back pain; Insomnia; Essential (primary) hypertension; Chronic diastolic (congestive) heart failure ([facility]); Allergies; COPD (chronic obstructive pulmonary disease) ([facility]); Chronic anticoagulation; Benign prostatic hyperplasia; Doyle catheter in place; Microscopic hematuria; Impaired mobility and endurance; Stage 3b chronic kidney disease (CKD) ([facility]); Iron deficiency anemia secondary to inadequate dietary iron intake; Vitamin D deficiency due to chronic kidney disease; Class 3 severe obesity with serious comorbidity in adult ([facility]); Lower extremity edema; Elevated alkaline phosphatase level; Venous insufficiency (chronic) (peripheral); Tobacco abuse, in remission; History of Pseudomonas pneumonia; Chronic dyspnea; Gastroesophageal reflux disease; Bronchiectasis  without complication (HCC); Wheezing; Poorly controlled type 2 diabetes mellitus with neuropathy (HCC); and Type 2 DM with CKD stage 3 and hypertension (HCC) on their problem list., the following items were addressed: medications; transitions to medical care; medical records; referrals to community service providers and any changes can be found within the plan section of the note.  A detailed listing of time spent for chronic care management is tracked within each outreach encounter.  Current medications include:  has a current medication list which includes the following prescription(s): albuterol sulfate hfa, apixaban, atorvastatin, b-d uf iii mini pen needles, bumetanide, bydureon bcise, calcium citrate, vitamin d3, farxiga, mucus relief dm, docusate sodium, duloxetine, famotidine, ferrous gluconate, finasteride, folic acid, gabapentin, glucose blood, heparin, hydralazine, insulin lispro, isosorbide mononitrate, lantus solostar, losartan, melatonin-magnesium citrate, metoprolol tartrate, montelukast, nifedipine xl, o2, povidone-iodine, sodium chloride, tramadol, and trueplus lancets 28g. and the patient is reported to be caregiver will take responsibility for med compliance,  Medications are reported to be non-effective in controlling symptoms and changes have been made to the medication protocol.  Regarding these diagnoses, referrals were made to the following provider(s):  specialists.  All notes on chart for PCP to review.    The patient was monitored remotely for pain; medications.    The patient's physical needs include:  help taking medications as prescribed; medication education; needs assistance with ADLs; physical healthcare; resources for disability needs; physician referral.     The patient's mental support needs include:  continued support    The patient's cognitive support needs include:  medication; continued support; needs assistance with ADLs    The patient's psychosocial support needs include:   continued support; coordination of community providers; medication management or adherence    The patient's functional needs include: health care coverage; medication education; needs assistance for ADLs; physical healthcare; physician referral; resources for disability needs    The patient's environmental needs include:  resources for disability needs    Care Plan overall comments:  Still not at goal, however improving. will continue to follow.    Refer to previous outreach notes for more information on the areas listed above.    Monthly Billing Diagnoses  (I48.0) Paroxysmal atrial fibrillation (HCC)    (K21.9) Gastroesophageal reflux disease without esophagitis    (I10) Essential (primary) hypertension    (E11.65,  Z79.4) Uncontrolled type 2 diabetes mellitus with hyperglycemia, with long-term current use of insulin (HCC)    Medications   · Medications have been reconciled    Care Plan progress this month:      Recently Modified Care Plans Updates made since 10/28/2022 12:00 AM    No recently modified care plans.          · Current Specialty Plan of Care Status signed by both patient and provider    Instructions   · Patient was provided an electronic copy of care plan  · CCM services were explained and offered and patient has accepted these services.  · Patient has given their written consent to receive CCM services and understands that this includes the authorization of electronic communication of medical information with the other treating providers.  · Patient understands that they may stop CCM services at any time and these changes will be effective at the end of the calendar month and will effectively revocate the agreement of CCM services.  · Patient understands that only one practitioner can furnish and be paid for CCM services during one calendar month.  Patient also understands that there may be co-payment or deductible fees in association with CCM services.  · Patient will continue with at least monthly  follow-up calls with the Ambulatory .    ALISSON GOMEZ  Ambulatory Case Management    11/28/2022, 17:22 EST

## 2022-11-29 RX ORDER — FAMOTIDINE 40 MG/1
40 TABLET, FILM COATED ORAL EVERY MORNING
Qty: 90 TABLET | Refills: 2 | Status: SHIPPED | OUTPATIENT
Start: 2022-11-29

## 2022-11-29 RX ORDER — METOPROLOL TARTRATE 100 MG/1
100 TABLET ORAL 2 TIMES DAILY
Qty: 180 TABLET | Refills: 2 | Status: SHIPPED | OUTPATIENT
Start: 2022-11-29

## 2022-11-29 RX ORDER — NIFEDIPINE 30 MG/1
30 TABLET, EXTENDED RELEASE ORAL EVERY MORNING
Qty: 90 TABLET | Refills: 2 | Status: SHIPPED | OUTPATIENT
Start: 2022-11-29

## 2022-11-29 RX ORDER — ATORVASTATIN CALCIUM 20 MG/1
20 TABLET, FILM COATED ORAL NIGHTLY
Qty: 90 TABLET | Refills: 2 | Status: SHIPPED | OUTPATIENT
Start: 2022-11-29

## 2022-11-29 RX ORDER — DOCUSATE SODIUM 100 MG/1
100 CAPSULE, LIQUID FILLED ORAL 2 TIMES DAILY
Qty: 60 CAPSULE | Refills: 5 | Status: SHIPPED | OUTPATIENT
Start: 2022-11-29 | End: 2023-02-07 | Stop reason: SDUPTHER

## 2022-11-30 DIAGNOSIS — M54.50 CHRONIC BILATERAL LOW BACK PAIN, UNSPECIFIED WHETHER SCIATICA PRESENT: ICD-10-CM

## 2022-11-30 DIAGNOSIS — G89.29 CHRONIC BILATERAL LOW BACK PAIN, UNSPECIFIED WHETHER SCIATICA PRESENT: ICD-10-CM

## 2022-12-01 RX ORDER — TRAMADOL HYDROCHLORIDE 50 MG/1
50 TABLET ORAL EVERY 6 HOURS PRN
Qty: 40 TABLET | Refills: 0 | Status: SHIPPED | OUTPATIENT
Start: 2022-12-01

## 2022-12-05 ENCOUNTER — PRIOR AUTHORIZATION (OUTPATIENT)
Dept: FAMILY MEDICINE CLINIC | Age: 57
End: 2022-12-05

## 2022-12-08 ENCOUNTER — OUTSIDE FACILITY SERVICE (OUTPATIENT)
Dept: FAMILY MEDICINE CLINIC | Age: 57
End: 2022-12-08

## 2022-12-08 ENCOUNTER — PATIENT OUTREACH (OUTPATIENT)
Dept: CASE MANAGEMENT | Facility: OTHER | Age: 57
End: 2022-12-08

## 2022-12-08 DIAGNOSIS — E11.65 UNCONTROLLED TYPE 2 DIABETES MELLITUS WITH HYPERGLYCEMIA, WITH LONG-TERM CURRENT USE OF INSULIN: Primary | ICD-10-CM

## 2022-12-08 DIAGNOSIS — Z79.4 UNCONTROLLED TYPE 2 DIABETES MELLITUS WITH HYPERGLYCEMIA, WITH LONG-TERM CURRENT USE OF INSULIN: Primary | ICD-10-CM

## 2022-12-08 PROCEDURE — OUTSIDEPOS PR OUTSIDE POS PLACEHOLDER: Performed by: FAMILY MEDICINE

## 2022-12-11 DIAGNOSIS — Z79.4 TYPE 2 DIABETES MELLITUS WITH DIABETIC NEUROPATHY, WITH LONG-TERM CURRENT USE OF INSULIN: ICD-10-CM

## 2022-12-11 DIAGNOSIS — E11.40 POORLY CONTROLLED TYPE 2 DIABETES MELLITUS WITH NEUROPATHY: ICD-10-CM

## 2022-12-11 DIAGNOSIS — E11.40 TYPE 2 DIABETES MELLITUS WITH DIABETIC NEUROPATHY, WITH LONG-TERM CURRENT USE OF INSULIN: ICD-10-CM

## 2022-12-11 DIAGNOSIS — E11.65 POORLY CONTROLLED TYPE 2 DIABETES MELLITUS WITH NEUROPATHY: ICD-10-CM

## 2022-12-12 RX ORDER — INSULIN LISPRO 100 [IU]/ML
INJECTION, SOLUTION INTRAVENOUS; SUBCUTANEOUS
Qty: 15 ML | Refills: 1 | OUTPATIENT
Start: 2022-12-12

## 2022-12-21 ENCOUNTER — PATIENT OUTREACH (OUTPATIENT)
Dept: CASE MANAGEMENT | Facility: OTHER | Age: 57
End: 2022-12-21

## 2022-12-21 DIAGNOSIS — E11.65 UNCONTROLLED TYPE 2 DIABETES MELLITUS WITH HYPERGLYCEMIA, WITH LONG-TERM CURRENT USE OF INSULIN: Primary | ICD-10-CM

## 2022-12-21 DIAGNOSIS — Z79.4 UNCONTROLLED TYPE 2 DIABETES MELLITUS WITH HYPERGLYCEMIA, WITH LONG-TERM CURRENT USE OF INSULIN: Primary | ICD-10-CM

## 2022-12-21 NOTE — OUTREACH NOTE
AMBULATORY CASE MANAGEMENT NOTE    Name and Relationship of Patient/Support Person: Preston Wallis - Self    Called number in chart, Elizabeth number.  Left voicemail regarding missing the pulmonary appointment.  Will follow up next week.       Gómez called back, he states everyone in the house was sick so they did not go out. Agreeable to reschedule.  Called and left message on pulm to call patient to reschedule.  Gómez states they will be in next week to fill medi planner.       ALISSON GOMEZ  Ambulatory Case Management    12/21/2022, 13:26 EST

## 2022-12-23 DIAGNOSIS — Z79.4 TYPE 2 DIABETES MELLITUS WITH HYPERGLYCEMIA, WITH LONG-TERM CURRENT USE OF INSULIN: ICD-10-CM

## 2022-12-23 DIAGNOSIS — E11.65 TYPE 2 DIABETES MELLITUS WITH HYPERGLYCEMIA, WITH LONG-TERM CURRENT USE OF INSULIN: ICD-10-CM

## 2022-12-27 RX ORDER — EXENATIDE 2 MG/.85ML
INJECTION, SUSPENSION, EXTENDED RELEASE SUBCUTANEOUS
Qty: 3.4 ML | Refills: 5 | Status: SHIPPED | OUTPATIENT
Start: 2022-12-27

## 2022-12-28 ENCOUNTER — PATIENT OUTREACH (OUTPATIENT)
Dept: CASE MANAGEMENT | Facility: OTHER | Age: 57
End: 2022-12-28

## 2022-12-28 DIAGNOSIS — I48.0 PAROXYSMAL ATRIAL FIBRILLATION: ICD-10-CM

## 2022-12-28 DIAGNOSIS — E11.65 UNCONTROLLED TYPE 2 DIABETES MELLITUS WITH HYPERGLYCEMIA, WITH LONG-TERM CURRENT USE OF INSULIN: Primary | ICD-10-CM

## 2022-12-28 DIAGNOSIS — I10 ESSENTIAL (PRIMARY) HYPERTENSION: ICD-10-CM

## 2022-12-28 DIAGNOSIS — I50.32 CHRONIC DIASTOLIC (CONGESTIVE) HEART FAILURE: ICD-10-CM

## 2022-12-28 DIAGNOSIS — Z79.4 UNCONTROLLED TYPE 2 DIABETES MELLITUS WITH HYPERGLYCEMIA, WITH LONG-TERM CURRENT USE OF INSULIN: Primary | ICD-10-CM

## 2022-12-28 PROCEDURE — 99490 CHRNC CARE MGMT STAFF 1ST 20: CPT | Performed by: FAMILY MEDICINE

## 2022-12-28 NOTE — OUTREACH NOTE
AMBULATORY CASE MANAGEMENT NOTE    Name and Relationship of Patient/Support Person: Kami Wallis - Emergency Contact    Elizabeth brought Gómez's meds in today, only had enough to fill for 5 days.  She is to stop by pharmacy to  remaining and bring to office tomorrow. Called Clinton at [facility] with refills needed.     ALISSON GOMEZ  Ambulatory Case Management    12/28/2022, 15:36 EST     [facility] End of Month Documentation    This Chronic Medical Management Care Plan for Preston Wallis, 57 y.o. male, has been monitored and managed; reviewed and a new plan of care implemented for the month of December.  A cumulative time of 37  minutes was spent on this patient record this month, including phone call with care giver; electronic communication with other providers; electronic communication with primary care provider; electronic communication with pharmacist; chart review; phone call with pharmacist.    Regarding the patient's problems: has Chronic cough; Type 1 diabetes mellitus with diabetic chronic kidney disease (HCC); Polyneuropathy; Peripheral neuropathy; Paroxysmal atrial fibrillation ([facility]); MILADY (obstructive sleep apnea); MRSA pneumonia ([facility]); Low back pain; Insomnia; Essential (primary) hypertension; Chronic diastolic (congestive) heart failure ([facility]); Allergies; COPD (chronic obstructive pulmonary disease) ([facility]); Chronic anticoagulation; Benign prostatic hyperplasia; Doyle catheter in place; Microscopic hematuria; Impaired mobility and endurance; Stage 3b chronic kidney disease (CKD) ([facility]); Iron deficiency anemia secondary to inadequate dietary iron intake; Vitamin D deficiency due to chronic kidney disease; Class 3 severe obesity with serious comorbidity in adult ([facility]); Lower extremity edema; Elevated alkaline phosphatase level; Venous insufficiency (chronic) (peripheral); Tobacco abuse, in remission; History of Pseudomonas pneumonia; Chronic dyspnea; Gastroesophageal reflux disease; Bronchiectasis without  complication (HCC); Wheezing; Poorly controlled type 2 diabetes mellitus with neuropathy (HCC); and Type 2 DM with CKD stage 3 and hypertension (HCC) on their problem list., the following items were addressed: medications; transitions to medical care; medical records; referrals to community service providers and any changes can be found within the plan section of the note.  A detailed listing of time spent for chronic care management is tracked within each outreach encounter.  Current medications include:  has a current medication list which includes the following prescription(s): albuterol sulfate hfa, apixaban, atorvastatin, b-d uf iii mini pen needles, bumetanide, bydureon bcise, calcium citrate, vitamin d3, farxiga, mucus relief dm, docusate sodium, duloxetine, famotidine, ferrous gluconate, finasteride, folic acid, gabapentin, glucose blood, heparin, hydralazine, insulin lispro, isosorbide mononitrate, lantus solostar, losartan, melatonin-magnesium citrate, metoprolol tartrate, montelukast, nifedipine xl, o2, povidone-iodine, sodium chloride, tramadol, and trueplus lancets 28g. and the patient is reported to be caregiver will take responsibility for med compliance,  Medications are reported to be non-effective in controlling symptoms and changes have been made to the medication protocol.  Regarding these diagnoses, referrals were made to the following provider(s):  specialists.  All notes on chart for PCP to review.    The patient was monitored remotely for pain; medications.    The patient's physical needs include:  help taking medications as prescribed; medication education; needs assistance with ADLs; physical healthcare; resources for disability needs; physician referral.     The patient's mental support needs include:  continued support    The patient's cognitive support needs include:  medication; continued support; needs assistance with ADLs    The patient's psychosocial support needs include:  continued  support; coordination of community providers; medication management or adherence    The patient's functional needs include: health care coverage; medication education; needs assistance for ADLs; physical healthcare; physician referral; resources for disability needs    The patient's environmental needs include:  resources for disability needs    Care Plan overall comments:  Still not at goal, however improving. will continue to follow.    Refer to previous outreach notes for more information on the areas listed above.    Monthly Billing Diagnoses  (E11.65,  Z79.4) Uncontrolled type 2 diabetes mellitus with hyperglycemia, with long-term current use of insulin (HCC)    (I48.0) Paroxysmal atrial fibrillation (HCC)    (I10) Essential (primary) hypertension    (I50.32) Chronic diastolic (congestive) heart failure (HCC)    Medications   · Medications have been reconciled    Care Plan progress this month:      Recently Modified Care Plans Updates made since 11/27/2022 12:00 AM    No recently modified care plans.          · Current Specialty Plan of Care Status signed by both patient and provider    Instructions   · Patient was provided an electronic copy of care plan  · CCM services were explained and offered and patient has accepted these services.  · Patient has given their written consent to receive CCM services and understands that this includes the authorization of electronic communication of medical information with the other treating providers.  · Patient understands that they may stop CCM services at any time and these changes will be effective at the end of the calendar month and will effectively revocate the agreement of CCM services.  · Patient understands that only one practitioner can furnish and be paid for CCM services during one calendar month.  Patient also understands that there may be co-payment or deductible fees in association with CCM services.  · Patient will continue with at least monthly follow-up  calls with the Ambulatory .    ALISSON GOMEZ  Ambulatory Case Management    12/28/2022, 15:36 EST

## 2023-01-09 ENCOUNTER — TRANSCRIBE ORDERS (OUTPATIENT)
Dept: LAB | Facility: HOSPITAL | Age: 58
End: 2023-01-09
Payer: COMMERCIAL

## 2023-01-09 ENCOUNTER — HOSPITAL ENCOUNTER (OUTPATIENT)
Dept: RESPIRATORY THERAPY | Facility: HOSPITAL | Age: 58
Discharge: HOME | End: 2023-01-09
Admitting: NURSE PRACTITIONER
Payer: COMMERCIAL

## 2023-01-09 ENCOUNTER — PATIENT OUTREACH (OUTPATIENT)
Dept: CASE MANAGEMENT | Facility: OTHER | Age: 58
End: 2023-01-09
Payer: COMMERCIAL

## 2023-01-09 DIAGNOSIS — N18.31 CHRONIC KIDNEY DISEASE (CKD) STAGE G3A/A1, MODERATELY DECREASED GLOMERULAR FILTRATION RATE (GFR) BETWEEN 45-59 ML/MIN/1.73 SQUARE METER AND ALBUMINURIA CREATININE RATIO LESS THAN 30 MG/G (CMS/H*: Primary | ICD-10-CM

## 2023-01-09 DIAGNOSIS — G47.33 OSA (OBSTRUCTIVE SLEEP APNEA): ICD-10-CM

## 2023-01-09 DIAGNOSIS — J44.9 CHRONIC OBSTRUCTIVE PULMONARY DISEASE, UNSPECIFIED COPD TYPE: ICD-10-CM

## 2023-01-09 DIAGNOSIS — E10.22 TYPE 1 DIABETES MELLITUS WITH STAGE 3A CHRONIC KIDNEY DISEASE: ICD-10-CM

## 2023-01-09 DIAGNOSIS — E11.65 UNCONTROLLED TYPE 2 DIABETES MELLITUS WITH HYPERGLYCEMIA, WITH LONG-TERM CURRENT USE OF INSULIN: Primary | ICD-10-CM

## 2023-01-09 DIAGNOSIS — E55.9 AVITAMINOSIS D: ICD-10-CM

## 2023-01-09 DIAGNOSIS — J47.9 BRONCHIECTASIS WITHOUT COMPLICATION: ICD-10-CM

## 2023-01-09 DIAGNOSIS — F17.201 TOBACCO ABUSE, IN REMISSION: ICD-10-CM

## 2023-01-09 DIAGNOSIS — K21.9 GASTROESOPHAGEAL REFLUX DISEASE, UNSPECIFIED WHETHER ESOPHAGITIS PRESENT: ICD-10-CM

## 2023-01-09 DIAGNOSIS — R05.3 CHRONIC COUGH: ICD-10-CM

## 2023-01-09 DIAGNOSIS — N18.31 TYPE 1 DIABETES MELLITUS WITH STAGE 3A CHRONIC KIDNEY DISEASE: ICD-10-CM

## 2023-01-09 DIAGNOSIS — Z79.4 UNCONTROLLED TYPE 2 DIABETES MELLITUS WITH HYPERGLYCEMIA, WITH LONG-TERM CURRENT USE OF INSULIN: Primary | ICD-10-CM

## 2023-01-09 DIAGNOSIS — Z87.01 HISTORY OF PSEUDOMONAS PNEUMONIA: ICD-10-CM

## 2023-01-09 DIAGNOSIS — R06.09 CHRONIC DYSPNEA: ICD-10-CM

## 2023-01-09 DIAGNOSIS — R06.2 WHEEZING: ICD-10-CM

## 2023-01-09 PROCEDURE — 94060 EVALUATION OF WHEEZING: CPT | Performed by: INTERNAL MEDICINE

## 2023-01-09 PROCEDURE — 94060 EVALUATION OF WHEEZING: CPT

## 2023-01-09 PROCEDURE — 94729 DIFFUSING CAPACITY: CPT

## 2023-01-09 PROCEDURE — 94729 DIFFUSING CAPACITY: CPT | Performed by: INTERNAL MEDICINE

## 2023-01-09 PROCEDURE — 94726 PLETHYSMOGRAPHY LUNG VOLUMES: CPT | Performed by: INTERNAL MEDICINE

## 2023-01-09 PROCEDURE — 94726 PLETHYSMOGRAPHY LUNG VOLUMES: CPT

## 2023-01-09 RX ORDER — FLURBIPROFEN SODIUM 0.3 MG/ML
SOLUTION/ DROPS OPHTHALMIC
Qty: 200 EACH | Refills: 1 | Status: SHIPPED | OUTPATIENT
Start: 2023-01-09

## 2023-01-09 RX ORDER — ALBUTEROL SULFATE 2.5 MG/3ML
2.5 SOLUTION RESPIRATORY (INHALATION) ONCE
Status: COMPLETED | OUTPATIENT
Start: 2023-01-09 | End: 2023-01-09

## 2023-01-09 RX ADMIN — ALBUTEROL SULFATE 2.5 MG: 2.5 SOLUTION RESPIRATORY (INHALATION) at 15:13

## 2023-01-09 NOTE — OUTREACH NOTE
AMBULATORY CASE MANAGEMENT NOTE    Name and Relationship of Patient/Support Person: Nephrology -     Called Nephrology to fax over lab order.  Gómez can have completed at office visit with Neli Paredes.  Fanny Farmer has already flushed port for this month, but it was too early to collect labs for the end of the month.  Gómez will have to be back in for med refill before then also.     Education Documentation  No documentation found.        ALISSON GOMEZ  Ambulatory Case Management    1/9/2023, 12:12 EST

## 2023-01-13 ENCOUNTER — OFFICE VISIT (OUTPATIENT)
Dept: DIABETES SERVICES | Facility: CLINIC | Age: 58
End: 2023-01-13
Payer: COMMERCIAL

## 2023-01-13 VITALS
TEMPERATURE: 98.1 F | OXYGEN SATURATION: 99 % | HEART RATE: 83 BPM | SYSTOLIC BLOOD PRESSURE: 140 MMHG | WEIGHT: 280 LBS | DIASTOLIC BLOOD PRESSURE: 61 MMHG | HEIGHT: 69 IN | BODY MASS INDEX: 41.47 KG/M2

## 2023-01-13 DIAGNOSIS — Z79.4 TYPE 2 DIABETES MELLITUS WITH STAGE 3A CHRONIC KIDNEY DISEASE, WITH LONG-TERM CURRENT USE OF INSULIN: ICD-10-CM

## 2023-01-13 DIAGNOSIS — E66.01 SEVERE OBESITY (BMI >= 40): ICD-10-CM

## 2023-01-13 DIAGNOSIS — N18.31 TYPE 2 DIABETES MELLITUS WITH STAGE 3A CHRONIC KIDNEY DISEASE, WITH LONG-TERM CURRENT USE OF INSULIN: ICD-10-CM

## 2023-01-13 DIAGNOSIS — E11.22 TYPE 2 DIABETES MELLITUS WITH STAGE 3A CHRONIC KIDNEY DISEASE, WITH LONG-TERM CURRENT USE OF INSULIN: ICD-10-CM

## 2023-01-13 DIAGNOSIS — Z79.4 TYPE 2 DIABETES MELLITUS WITH DIABETIC NEUROPATHY, WITH LONG-TERM CURRENT USE OF INSULIN: ICD-10-CM

## 2023-01-13 DIAGNOSIS — E11.65 UNCONTROLLED TYPE 2 DIABETES MELLITUS WITH HYPERGLYCEMIA: Primary | ICD-10-CM

## 2023-01-13 DIAGNOSIS — E11.40 TYPE 2 DIABETES MELLITUS WITH DIABETIC NEUROPATHY, WITH LONG-TERM CURRENT USE OF INSULIN: ICD-10-CM

## 2023-01-13 PROBLEM — R56.9 SEIZURES (HCC): Status: ACTIVE | Noted: 2020-08-24

## 2023-01-13 PROBLEM — I11.9 HYPERTENSIVE HEART DISEASE WITHOUT CONGESTIVE HEART FAILURE: Status: ACTIVE | Noted: 2023-01-13

## 2023-01-13 PROBLEM — Z87.891 EX-SMOKER: Status: ACTIVE | Noted: 2023-01-13

## 2023-01-13 PROBLEM — S42.209A FRACTURE OF PROXIMAL HUMERUS: Status: ACTIVE | Noted: 2023-01-13

## 2023-01-13 PROBLEM — I50.32 CHRONIC DIASTOLIC HEART FAILURE: Status: ACTIVE | Noted: 2022-02-01

## 2023-01-13 PROBLEM — S82.899A ANKLE FRACTURE: Status: ACTIVE | Noted: 2023-01-13

## 2023-01-13 PROBLEM — E66.811 CLASS 1 OBESITY: Status: ACTIVE | Noted: 2022-04-06

## 2023-01-13 PROBLEM — D64.9 ANEMIA: Status: ACTIVE | Noted: 2022-05-12

## 2023-01-13 PROBLEM — R06.02 SHORTNESS OF BREATH: Status: ACTIVE | Noted: 2022-08-25

## 2023-01-13 PROBLEM — E11.9 DIABETES: Status: ACTIVE | Noted: 2022-09-01

## 2023-01-13 PROBLEM — I83.229 INFECTED STASIS ULCER OF LEFT LOWER EXTREMITY: Status: ACTIVE | Noted: 2023-01-13

## 2023-01-13 PROBLEM — N18.9 CHRONIC KIDNEY DISEASE: Status: ACTIVE | Noted: 2023-01-13

## 2023-01-13 PROBLEM — R41.82 ALTERED MENTAL STATUS: Status: ACTIVE | Noted: 2020-08-25

## 2023-01-13 PROBLEM — I10 HYPERTENSION: Status: ACTIVE | Noted: 2022-01-12

## 2023-01-13 PROBLEM — H93.19 TINNITUS: Status: ACTIVE | Noted: 2023-01-13

## 2023-01-13 PROBLEM — R80.9 PROTEINURIA: Status: ACTIVE | Noted: 2022-10-28

## 2023-01-13 PROBLEM — I82.409: Status: ACTIVE | Noted: 2023-01-13

## 2023-01-13 PROBLEM — J45.909 ASTHMA: Status: ACTIVE | Noted: 2023-01-13

## 2023-01-13 PROBLEM — E87.6 HYPOKALEMIA: Status: ACTIVE | Noted: 2023-01-13

## 2023-01-13 PROBLEM — I50.9 CONGESTIVE HEART FAILURE: Status: ACTIVE | Noted: 2022-05-12

## 2023-01-13 PROBLEM — E86.0 LUETSCHER'S SYNDROME: Status: ACTIVE | Noted: 2023-01-13

## 2023-01-13 PROBLEM — R60.9 EDEMA: Status: ACTIVE | Noted: 2023-01-13

## 2023-01-13 PROBLEM — E66.9 CLASS 1 OBESITY: Status: ACTIVE | Noted: 2022-04-06
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
## 2023-01-13 PROBLEM — L97.929 INFECTED STASIS ULCER OF LEFT LOWER EXTREMITY: Status: ACTIVE | Noted: 2023-01-13

## 2023-01-13 PROBLEM — E11.9 TYPE 2 DIABETES MELLITUS WITHOUT COMPLICATION: Status: ACTIVE | Noted: 2020-08-25

## 2023-01-13 PROBLEM — T14.8XXA ABRASION: Status: ACTIVE | Noted: 2023-01-13

## 2023-01-13 PROBLEM — J44.1 COPD EXACERBATION: Status: ACTIVE | Noted: 2020-08-25

## 2023-01-13 PROBLEM — G62.9 POLYNEUROPATHY: Status: ACTIVE | Noted: 2022-05-12

## 2023-01-13 PROBLEM — E78.5 HYPERLIPIDEMIA: Status: ACTIVE | Noted: 2022-05-12

## 2023-01-13 PROBLEM — J15.1 PNEUMONIA DUE TO PSEUDOMONAS SPECIES: Status: ACTIVE | Noted: 2023-01-13

## 2023-01-13 PROBLEM — N17.9 ACUTE RENAL FAILURE SYNDROME: Status: ACTIVE | Noted: 2022-01-12

## 2023-01-13 PROBLEM — S72.453A: Status: ACTIVE | Noted: 2022-01-12

## 2023-01-13 PROBLEM — N31.9 NEUROGENIC BLADDER: Status: ACTIVE | Noted: 2022-10-28

## 2023-01-13 LAB
EXPIRATION DATE: ABNORMAL
HBA1C MFR BLD: 7.8 %
Lab: ABNORMAL

## 2023-01-13 PROCEDURE — 99204 OFFICE O/P NEW MOD 45 MIN: CPT | Performed by: NURSE PRACTITIONER

## 2023-01-13 RX ORDER — TRAZODONE HYDROCHLORIDE 100 MG/1
TABLET ORAL
COMMUNITY
Start: 2022-12-08 | End: 2023-02-02 | Stop reason: DRUGHIGH

## 2023-01-13 RX ORDER — BLOOD-GLUCOSE SENSOR
1 EACH MISCELLANEOUS
Qty: 2 EACH | Refills: 5 | Status: SHIPPED | OUTPATIENT
Start: 2023-01-13 | End: 2023-01-13

## 2023-01-13 NOTE — PROGRESS NOTES
"Chief Complaint  Diabetes (New pt, est care with diabetic provider, a1c eval, possible CGM )    Referred By: Kimmy Riley MD    Subjective          Preston Wallis presents to [facility] for diabetes medication management    History of Present Illness    Visit type:  to establish care  Diabetes type:  Type 2  Age at time of dx/Year of dx/Number of years: He estimates around 20 years  Family History of Diabetes: Mother and sisters  Current diabetes status/concerns/issues: He has been referred to our office for assistance in acquiring a continuous glucose sensor.  He has also been struggling with high glucose levels for a while.  He has been seen recently by the dietitian for nutrition counseling specific to diabetes.  Other current health concerns: COPD and emphysema, urinary retention requiring catheterization  Current Diabetes symptoms:    Polyuria: No   Polydipsia: Yes   Polyphagia: Yes   Blurred vision: Yes   Excessive fatigue: No  Known Diabetes complications:  Neuro: Neuropathy in the feet with tingling, numbness, feeling like they have \"frost bite\", and shooting pains  Renal: Stage IIIa  Eyes: None  Amputation/Wounds: He has a healed wound on the right foot  GI: None  Cardiovascular: Hypertension, hyperlipidemia, congestive heart failure  ED: None  Other: None  Hospitalizations/ED/911 secondary to DM?  Yes, He has been hospitalized on 2 separate occasions due to hyperglycemia  Hypoglycemia:  Level 1 hypoglycemia (54 mg/dL - 70 mg/dL); Frequency - He reports a rare occasion.  His last event was approximately 3 months ago when he had a glucose of 66  Hypoglycemia Symptoms:  Lightheadedness  Current Diabetes treatment: Byrureon 2 mg once weekly which he has been on for a while, Farxiga 5 mg once a day, Lantus 40 units once a day in the morning and Humalog before each meal using sliding scale.  He is typically taking it twice a day  Prior diabetes treatments: He is uncertain of " other medications used; he does recall metformin  Using ACEI or ARB: Yes, Losartan  Using Statin: Yes, Atorvastatin  Blood glucose device:  Meter  Blood glucose monitoring frequency:  1  Blood glucose range/average:  140-400  Dietary behavior:  Limits high carb/sweet foods, Avoids sugary drinks, Number of meals each day - 2-3; Number of snacks each day - Occasional, History of Diabetes Nutrition Counseling - YES  Activity/Exercise:  He is essentially wheelchair-bound.  He can have limited mobility using a walker  Last Eye Exam: 2021; Location: [place]  Last Foot Exam: per PCP  Diabetes Education Hx: None  Social Determinants of Health: Reading/writing/math/language barriers; He states he has difficulty with reading and writing    Past Medical History:   Diagnosis Date   • Age-related cognitive decline    • Allergic contact dermatitis    • Allergies    • Anemia    • Bronchiectasis with acute lower respiratory infection ([facility])    • Chronic diastolic (congestive) heart failure ([facility])    • Chronic kidney disease    • Chronic respiratory failure with hypoxia ([facility])    • COPD (chronic obstructive pulmonary disease) ([facility])    • Dependence on supplemental oxygen    • Eczema    • Erectile dysfunction     due to organic reasons   • Essential (primary) hypertension    • Fracture     closed fracture of other tarsal and metatarsal bones   • GERD without esophagitis    • High risk medication use    • Hypercholesteremia    • Hypomagnesemia    • Insomnia    • Low back pain    • Major depressive disorder    • Morbid (severe) obesity due to excess calories ([facility])    • MRSA pneumonia ([facility])    • Muscle weakness    • 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 without evidence of necrosis ([facility])    • Obstructive sleep apnea (adult) (pediatric)    • Other forms of dyspnea    • Other long term (current) drug therapy    • Other specified noninfective gastroenteritis and colitis    • Other spondylosis,  lumbar region    • Pain in both knees    • Paroxysmal atrial fibrillation (HCC)    • Peripheral neuropathy     attributed to type 2 diabetes   • Pneumonia, unspecified organism    • Polyneuropathy    • Rash and other nonspecific skin eruption    • Syncope and collapse    • Tachycardia    • Type 1 diabetes mellitus with diabetic chronic kidney disease (HCC)    • Type 2 diabetes mellitus (HCC)    • Unspecified fall, initial encounter    • Urinary retention      Past Surgical History:   Procedure Laterality Date   • CHOLECYSTECTOMY     • CYSTOSCOPY     • FEMUR SURGERY Left     Shravan placed   • KNEE SURGERY Left    • OTHER SURGICAL HISTORY Left     venous port   • TONSILLECTOMY AND ADENOIDECTOMY       Family History   Problem Relation Age of Onset   • Coronary artery disease Mother    • Hypertension Mother    • Diabetes type II Mother    • Asthma Father    • Diabetes type II Sister    • Cancer Sister      Social History     Socioeconomic History   • Marital status:    Tobacco Use   • Smoking status: Former     Packs/day: 1.00     Years: 12.00     Pack years: 12.00     Types: Cigarettes     Start date:      Quit date:      Years since quittin.0   • Smokeless tobacco: Never   Vaping Use   • Vaping Use: Never used   Substance and Sexual Activity   • Alcohol use: Not Currently   • Drug use: Never   • Sexual activity: Defer     Allergies   Allergen Reactions   • Benadryl [Diphenhydramine] Itching   • Proventil [Albuterol] Other (See Comments)     Mouth sores         Current Outpatient Medications:   •  albuterol sulfate  (90 Base) MCG/ACT inhaler, Inhale 2 puffs 4 (Four) Times a Day As Needed for Wheezing., Disp: 18 g, Rfl: 11  •  apixaban (Eliquis) 5 MG tablet tablet, Take 1 tablet by mouth Every 12 (Twelve) Hours., Disp: 180 tablet, Rfl: 2  •  atorvastatin (LIPITOR) 20 MG tablet, Take 1 tablet by mouth Every Night., Disp: 90 tablet, Rfl: 2  •  B-D UF III MINI PEN NEEDLES 31G X 5 MM misc, USE TO  INJECT insulin AS DIRECTED, Disp: 200 each, Rfl: 1  •  bumetanide (BUMEX) 2 MG tablet, Take 1 tablet twice daily .  Call cardiology  if weight is great then 2 pounds in one day or 5 pounds in a week, Disp: 180 tablet, Rfl: 1  •  Bydureon BCise 2 MG/0.85ML auto-injector injection, inject 2mg (one pen) UNDER THE SKIN into THE appropriate AREA AS DIRECTED one time PER WEEK, Disp: 3.4 mL, Rfl: 5  •  calcium citrate (CALCITRATE) 950 (200 Ca) MG tablet, Take 1 tablet by mouth Daily., Disp: 90 tablet, Rfl: 3  •  Cholecalciferol (Vitamin D3) 50 MCG (2000 UT) tablet, Take 1 tablet BY MOUTH EVERY DAY, Disp: 90 tablet, Rfl: 1  •  dapagliflozin (Farxiga) 5 MG tablet tablet, Take 1 tablet by mouth Daily., Disp: 90 tablet, Rfl: 1  •  Dextromethorphan-guaiFENesin (Mucus Relief DM)  MG tablet sustained-release 12 hour, Take 1 tablet BY MOUTH TWICE DAILY AS NEEDED FOR congestion, Disp: 60 tablet, Rfl: 3  •  docusate sodium (COLACE) 100 MG capsule, Take 1 capsule by mouth 2 (Two) Times a Day., Disp: 60 capsule, Rfl: 5  •  DULoxetine (CYMBALTA) 60 MG capsule, TAKE 1 CAPSULE BY MOUTH TWICE DAILY, Disp: 180 capsule, Rfl: 1  •  famotidine (PEPCID) 40 MG tablet, Take 1 tablet by mouth Every Morning., Disp: 90 tablet, Rfl: 2  •  ferrous gluconate (FERGON) 324 MG tablet, Take 1 tablet by mouth Daily With Breakfast., Disp: 90 tablet, Rfl: 1  •  finasteride (PROSCAR) 5 MG tablet, Take 1 tablet by mouth Daily., Disp: 90 tablet, Rfl: 0  •  folic acid (FOLVITE) 1 MG tablet, Take 1 tablet BY MOUTH EVERY DAY, Disp: 90 tablet, Rfl: 1  •  gabapentin (NEURONTIN) 300 MG capsule, 1 po in am, 2 po qhs, Disp: 90 capsule, Rfl: 2  •  glucose blood test strip, 1 each by Other route 3 (Three) Times a Day. Check blood sugar each meal 3 times daily     Dx:   E11.40, Disp: 100 each, Rfl: 4  •  heparin 100 UNIT/ML solution injection, 5 mL by Intracatheter route Every 30 (Thirty) Days., Disp: 5 mL, Rfl: 12  •  hydrALAZINE (APRESOLINE) 50 MG tablet, Take 1  tablet BY MOUTH TWICE DAILY, Disp: 180 tablet, Rfl: 1  •  Insulin Lispro (ADMELOG SOLOSTAR) 100 UNIT/ML injection pen, Inject 8 Units under the skin into the appropriate area as directed 3 (Three) Times a Day. Per SSI, if BS <160 = 0 units, 161-220= 2 unit, 221-280= 4units, 281-340= 6units, 341-400= 8units  Dx: E11.40, Disp: 15 mL, Rfl: 1  •  isosorbide mononitrate (IMDUR) 30 MG 24 hr tablet, Take 1 tablet by mouth Daily., Disp: 90 tablet, Rfl: 1  •  Lantus SoloStar 100 UNIT/ML injection pen, INJECT 40 UNITS UNDER THE SKIN ONCE DAILY, Disp: 15 mL, Rfl: 1  •  losartan (COZAAR) 50 MG tablet, Take 1 tablet by mouth Daily., Disp: , Rfl:   •  Melatonin-Magnesium Citrate 1-71.5 MG tablet, Take 1 tablet by mouth Every Night., Disp: 90 tablet, Rfl: 1  •  metoprolol tartrate (LOPRESSOR) 100 MG tablet, Take 1 tablet by mouth 2 (Two) Times a Day., Disp: 180 tablet, Rfl: 2  •  montelukast (SINGULAIR) 10 MG tablet, Take 1 tablet BY MOUTH EVERY night, Disp: 90 tablet, Rfl: 1  •  NIFEdipine XL (PROCARDIA XL) 30 MG 24 hr tablet, Take 1 tablet by mouth Every Morning., Disp: 90 tablet, Rfl: 2  •  O2 (OXYGEN), 2 Liter O2 - CONTINUOUS (route: Oxygen), Disp: , Rfl:   •  povidone-iodine (Betadine) 10 % external solution, Apply  topically to the appropriate area as directed As Needed for Wound Care., Disp: 118 mL, Rfl: 0  •  sodium chloride 0.9 % injection, Infuse 10 mL into a venous catheter Every 30 (Thirty) Days. Standing order.  Port flush q 30 days - 5mL Heparin, Disp: 10 mL, Rfl: 12  •  traMADol (ULTRAM) 50 MG tablet, Take 1 tablet by mouth Every 6 (Six) Hours As Needed for Moderate Pain., Disp: 40 tablet, Rfl: 0  •  traZODone (DESYREL) 100 MG tablet, , Disp: , Rfl:   •  TRUEplus Lancets 28G misc, USE AS DIRECTED, Disp: 100 each, Rfl: 0  •  Continuous Blood Gluc  (FreeStyle Bethany 2 [place]) device, 1 each 1 (One) Time for 1 dose., Disp: 1 each, Rfl: 0  •  Continuous Blood Gluc Sensor (FreeStyle Bethany 2 Sensor) misc, 1 each  "Every 14 (Fourteen) Days., Disp: 2 each, Rfl: 11    Review of Systems   Constitutional: Negative for activity change, appetite change, fatigue, unexpected weight gain and unexpected weight loss.   Eyes: Positive for blurred vision. Negative for visual disturbance.   Gastrointestinal: Negative for abdominal pain, constipation, diarrhea, nausea, vomiting, GERD and indigestion.   Endocrine: Positive for polydipsia and polyphagia. Negative for polyuria.   Neurological: Positive for numbness (In the feet).        Objective     Vitals:    01/13/23 1316   BP: 140/61   BP Location: Left arm   Patient Position: Sitting   Cuff Size: Adult   Pulse: 83   Temp: 98.1 °F (36.7 °C)   SpO2: 99%   Weight: 127 kg (280 lb)   Height: 175.3 cm (69\")   PainSc:   9     Body mass index is 41.35 kg/m².    Physical Exam  Constitutional:       Appearance: Normal appearance. He is obese.      Comments: Severe obesity with BMI of 41.35   HENT:      Head: Normocephalic and atraumatic.      Right Ear: External ear normal.      Left Ear: External ear normal.      Nose: Nose normal.   Eyes:      Extraocular Movements: Extraocular movements intact.      Conjunctiva/sclera: Conjunctivae normal.   Pulmonary:      Effort: Pulmonary effort is normal.   Musculoskeletal:         General: Normal range of motion.      Cervical back: Normal range of motion.      Comments: He requires use of a walker or wheelchair for mobility   Skin:     General: Skin is warm and dry.   Neurological:      General: No focal deficit present.      Mental Status: He is alert and oriented to person, place, and time. Mental status is at baseline.   Psychiatric:         Mood and Affect: Mood normal.         Behavior: Behavior normal.         Thought Content: Thought content normal.         Judgment: Judgment normal.         Result Review :   The following data was reviewed by: CON Ashley on 01/13/2023:    Most Recent A1C    HGBA1C Most Recent 1/13/23   Hemoglobin A1C " 7.8 (A)   (A) Abnormal value              A1C Last 3 Results    HGBA1C Last 3 Results 6/22/22 10/5/22 1/13/23   Hemoglobin A1C 8.30 (A) 9.10 (A) 7.8 (A)   (A) Abnormal value            Point-of-care A1c in the office today is 7.8% indicating uncontrolled type 2 diabetes.  This is down from prior result collected in October of this year.      Creatinine   Date Value Ref Range Status   11/03/2022 1.64 (H) 0.76 - 1.27 mg/dL Final   08/22/2022 1.63 (H) 0.76 - 1.27 mg/dL Final     eGFR   Date Value Ref Range Status   11/03/2022 48.5 (L) >60.0 mL/min/1.73 Final     Comment:     National Kidney Foundation and American Society of Nephrology (ASN) Task Force recommended calculation based on the Chronic Kidney Disease Epidemiology Collaboration (CKD-EPI) equation refit without adjustment for race.   08/22/2022 48.8 (L) >60.0 mL/min/1.73 Final     Comment:     National Kidney Foundation and American Society of Nephrology (ASN) Task Force recommended calculation based on the Chronic Kidney Disease Epidemiology Collaboration (CKD-EPI) equation refit without adjustment for race.     Labs collected on 11/3/2022 show stage IIIa renal disease            Assessment: The patient has had improvement in his A1c since last evaluation.  He has implemented dietary changes with looking at his carbohydrates and portion sizes since seeing the dietitian in August last year.  He is primarily interested in a continuous glucose sensor to help him monitor his glucose levels more easily.  He is not always using his Admelog insulin or guessing how much insulin he needs to take before the meals based on how he feels.  The dangers of this behavior were discussed with the patient.      Diagnoses and all orders for this visit:    1. Uncontrolled type 2 diabetes mellitus with hyperglycemia (HCC) (Primary)  -     POC Glycosylated Hemoglobin (Hb A1C)  -     Continuous Blood Gluc  (directworxyle Bethany 2 [place]) device; 1 each 1 (One) Time for 1 dose.   Dispense: 1 each; Refill: 0  -     Continuous Blood Gluc Sensor (FreeStyle Bethany 2 Sensor) misc; 1 each Every 14 (Fourteen) Days.  Dispense: 2 each; Refill: 11    2. Type 2 diabetes mellitus with diabetic neuropathy, with long-term current use of insulin ([facility])    3. Type 2 diabetes mellitus with stage 3a chronic kidney disease, with long-term current use of insulin ([facility])    4. Severe obesity (BMI >= 40) ([facility])    Other orders  -     Discontinue: Continuous Blood Gluc Sensor (FreeStyle Bethany 3 Sensor) misc; 1 each Every 14 (Fourteen) Days.  Dispense: 2 each; Refill: 5        Plan: We will process the patient for the bethany 2 continuous glucose sensor.  If he needs assistance in starting the device he will contact our office.  The patient is strongly encouraged to comply with use of his mealtime insulin to help further improve glucose control.    The patient will monitor his blood glucose levels using the continuous glucose sensor.  If he develops problematic hyperglycemia or hypoglycemia or adverse drug reactions, he will contact the office for further instructions.        Follow Up     Return in about 3 months (around 4/13/2023) for Medication Management, CGM Follow-up.    Patient was given instructions and counseling regarding his condition or for health maintenance advice. Please see specific information pulled into the AVS if appropriate.     Neli Paredes, CON  01/13/2023      Dictated Utilizing Dragon Dictation.  Please note that portions of this note were completed with a voice recognition program.  Part of this note may be an electronic transcription/translation of spoken language to printed text using the Dragon Dictation System.

## 2023-01-16 ENCOUNTER — TELEPHONE (OUTPATIENT)
Dept: DIABETES SERVICES | Facility: HOSPITAL | Age: 58
End: 2023-01-16
Payer: COMMERCIAL

## 2023-01-16 NOTE — TELEPHONE ENCOUNTER
PA SUB JOANNA 1-16-23 READER  Key: [id number] - Rx #: [id number]      Approvedtoday  The request has been approved. The authorization is effective from 01/16/2023 to 01/15/2024, as long as the member is enrolled in their current health plan. The request was approved as submitted. A written notification letter will follow with additional details.

## 2023-01-17 ENCOUNTER — PATIENT OUTREACH (OUTPATIENT)
Dept: CASE MANAGEMENT | Facility: OTHER | Age: 58
End: 2023-01-17
Payer: COMMERCIAL

## 2023-01-17 ENCOUNTER — TELEPHONE (OUTPATIENT)
Dept: DIABETES SERVICES | Facility: HOSPITAL | Age: 58
End: 2023-01-17
Payer: COMMERCIAL

## 2023-01-17 DIAGNOSIS — E11.65 UNCONTROLLED TYPE 2 DIABETES MELLITUS WITH HYPERGLYCEMIA, WITH LONG-TERM CURRENT USE OF INSULIN: Primary | ICD-10-CM

## 2023-01-17 DIAGNOSIS — Z79.4 UNCONTROLLED TYPE 2 DIABETES MELLITUS WITH HYPERGLYCEMIA, WITH LONG-TERM CURRENT USE OF INSULIN: Primary | ICD-10-CM

## 2023-01-17 NOTE — TELEPHONE ENCOUNTER
PA SUB JOANNA SENS 1-17-23    Approvedtoday  The request has been approved. The authorization is effective from 01/17/2023 to 01/16/2024, as long as the member is enrolled in their current health plan. The request was approved as submitted. A written notification letter will follow with additional details

## 2023-01-17 NOTE — OUTREACH NOTE
AMBULATORY CASE MANAGEMENT NOTE    Name and Relationship of Patient/Support Person:  -     Gómez brought his medications in to be filled.  Was only able to supply him with 17 days.  Also informed due to labs for nephrology.  AVS given to patient with appointment information.  He did see Neli Paredse and his A1c was improved.  Also will be getting the continuous glucose monitor.      Education Documentation  No documentation found.        ALISSON GOMEZ  Ambulatory Case Management    1/17/2023, 13:00 EST

## 2023-01-25 ENCOUNTER — LAB (OUTPATIENT)
Dept: LAB | Facility: HOSPITAL | Age: 58
End: 2023-01-25
Payer: COMMERCIAL

## 2023-01-25 DIAGNOSIS — J47.9 BRONCHIECTASIS WITHOUT COMPLICATION: ICD-10-CM

## 2023-01-25 DIAGNOSIS — E11.40 POORLY CONTROLLED TYPE 2 DIABETES MELLITUS WITH NEUROPATHY: ICD-10-CM

## 2023-01-25 DIAGNOSIS — R06.2 WHEEZING: ICD-10-CM

## 2023-01-25 DIAGNOSIS — E11.65 POORLY CONTROLLED TYPE 2 DIABETES MELLITUS WITH NEUROPATHY: ICD-10-CM

## 2023-01-25 DIAGNOSIS — F17.201 TOBACCO ABUSE, IN REMISSION: ICD-10-CM

## 2023-01-25 DIAGNOSIS — Z79.4 TYPE 2 DIABETES MELLITUS WITH DIABETIC NEUROPATHY, WITH LONG-TERM CURRENT USE OF INSULIN: ICD-10-CM

## 2023-01-25 DIAGNOSIS — J44.9 CHRONIC OBSTRUCTIVE PULMONARY DISEASE, UNSPECIFIED COPD TYPE: ICD-10-CM

## 2023-01-25 DIAGNOSIS — E11.40 TYPE 2 DIABETES MELLITUS WITH DIABETIC NEUROPATHY, WITH LONG-TERM CURRENT USE OF INSULIN: ICD-10-CM

## 2023-01-25 DIAGNOSIS — G47.33 OSA (OBSTRUCTIVE SLEEP APNEA): ICD-10-CM

## 2023-01-25 DIAGNOSIS — R05.3 CHRONIC COUGH: ICD-10-CM

## 2023-01-25 DIAGNOSIS — K21.9 GASTROESOPHAGEAL REFLUX DISEASE, UNSPECIFIED WHETHER ESOPHAGITIS PRESENT: ICD-10-CM

## 2023-01-25 DIAGNOSIS — R06.09 CHRONIC DYSPNEA: ICD-10-CM

## 2023-01-25 DIAGNOSIS — Z87.01 HISTORY OF PSEUDOMONAS PNEUMONIA: ICD-10-CM

## 2023-01-25 DIAGNOSIS — E55.9 AVITAMINOSIS D: ICD-10-CM

## 2023-01-25 DIAGNOSIS — N18.31 CHRONIC KIDNEY DISEASE (CKD) STAGE G3A/A1, MODERATELY DECREASED GLOMERULAR FILTRATION RATE (GFR) BETWEEN 45-59 ML/MIN/1.73 SQUARE METER AND ALBUMINURIA CREATININE RATIO LESS THAN 30 MG/G (CMS/H*: ICD-10-CM

## 2023-01-25 LAB
ANION GAP SERPL CALCULATED.3IONS-SCNC: 8.2 MMOL/L (ref 5–15)
BACTERIA UR QL AUTO: NORMAL /HPF
BASOPHILS # BLD AUTO: 0.05 10*3/MM3 (ref 0–0.2)
BASOPHILS NFR BLD AUTO: 0.4 % (ref 0–1.5)
BILIRUB UR QL STRIP: NEGATIVE
BUN SERPL-MCNC: 31 MG/DL (ref 6–20)
BUN/CREAT SERPL: 15.8 (ref 7–25)
CALCIUM SPEC-SCNC: 9.1 MG/DL (ref 8.6–10.5)
CHLORIDE SERPL-SCNC: 94 MMOL/L (ref 98–107)
CLARITY UR: CLEAR
CO2 SERPL-SCNC: 33.8 MMOL/L (ref 22–29)
COLOR UR: YELLOW
CREAT SERPL-MCNC: 1.96 MG/DL (ref 0.76–1.27)
CREAT UR-MCNC: 37.8 MG/DL
DEPRECATED RDW RBC AUTO: 42.5 FL (ref 37–54)
EGFRCR SERPLBLD CKD-EPI 2021: 39.1 ML/MIN/1.73
EOSINOPHIL # BLD AUTO: 0.31 10*3/MM3 (ref 0–0.4)
EOSINOPHIL NFR BLD AUTO: 2.7 % (ref 0.3–6.2)
ERYTHROCYTE [DISTWIDTH] IN BLOOD BY AUTOMATED COUNT: 13 % (ref 12.3–15.4)
GLUCOSE SERPL-MCNC: 434 MG/DL (ref 65–99)
GLUCOSE UR STRIP-MCNC: ABNORMAL MG/DL
HCT VFR BLD AUTO: 31.7 % (ref 37.5–51)
HGB BLD-MCNC: 9.8 G/DL (ref 13–17.7)
HGB UR QL STRIP.AUTO: NEGATIVE
HYALINE CASTS UR QL AUTO: NORMAL /LPF
IMM GRANULOCYTES # BLD AUTO: 0.02 10*3/MM3 (ref 0–0.05)
IMM GRANULOCYTES NFR BLD AUTO: 0.2 % (ref 0–0.5)
KETONES UR QL STRIP: NEGATIVE
LEUKOCYTE ESTERASE UR QL STRIP.AUTO: NEGATIVE
LYMPHOCYTES # BLD AUTO: 2.2 10*3/MM3 (ref 0.7–3.1)
LYMPHOCYTES NFR BLD AUTO: 19 % (ref 19.6–45.3)
MCH RBC QN AUTO: 27.6 PG (ref 26.6–33)
MCHC RBC AUTO-ENTMCNC: 30.9 G/DL (ref 31.5–35.7)
MCV RBC AUTO: 89.3 FL (ref 79–97)
MONOCYTES # BLD AUTO: 0.98 10*3/MM3 (ref 0.1–0.9)
MONOCYTES NFR BLD AUTO: 8.5 % (ref 5–12)
NEUTROPHILS NFR BLD AUTO: 69.2 % (ref 42.7–76)
NEUTROPHILS NFR BLD AUTO: 8 10*3/MM3 (ref 1.7–7)
NITRITE UR QL STRIP: NEGATIVE
PH UR STRIP.AUTO: 5.5 [PH] (ref 5–8)
PHOSPHATE SERPL-MCNC: 4.5 MG/DL (ref 2.5–4.5)
PLATELET # BLD AUTO: 405 10*3/MM3 (ref 140–450)
PMV BLD AUTO: 8.7 FL (ref 6–12)
POTASSIUM SERPL-SCNC: 4.6 MMOL/L (ref 3.5–5.2)
PROT ?TM UR-MCNC: 75.9 MG/DL
PROT UR QL STRIP: ABNORMAL
PROT/CREAT UR: 2.01 MG/G{CREAT}
RBC # BLD AUTO: 3.55 10*6/MM3 (ref 4.14–5.8)
RBC # UR STRIP: NORMAL /HPF
REF LAB TEST METHOD: NORMAL
SODIUM SERPL-SCNC: 136 MMOL/L (ref 136–145)
SP GR UR STRIP: 1.01 (ref 1–1.03)
SQUAMOUS #/AREA URNS HPF: NORMAL /HPF
UROBILINOGEN UR QL STRIP: ABNORMAL
WBC # UR STRIP: NORMAL /HPF
WBC NRBC COR # BLD: 11.56 10*3/MM3 (ref 3.4–10.8)

## 2023-01-25 PROCEDURE — 83970 ASSAY OF PARATHORMONE: CPT

## 2023-01-25 PROCEDURE — 36415 COLL VENOUS BLD VENIPUNCTURE: CPT

## 2023-01-25 PROCEDURE — 80048 BASIC METABOLIC PNL TOTAL CA: CPT

## 2023-01-25 PROCEDURE — 82785 ASSAY OF IGE: CPT

## 2023-01-25 PROCEDURE — 82306 VITAMIN D 25 HYDROXY: CPT

## 2023-01-25 PROCEDURE — 84156 ASSAY OF PROTEIN URINE: CPT

## 2023-01-25 PROCEDURE — 82570 ASSAY OF URINE CREATININE: CPT

## 2023-01-25 PROCEDURE — 81001 URINALYSIS AUTO W/SCOPE: CPT

## 2023-01-25 PROCEDURE — 85025 COMPLETE CBC W/AUTO DIFF WBC: CPT

## 2023-01-25 PROCEDURE — 84100 ASSAY OF PHOSPHORUS: CPT

## 2023-01-26 LAB
25(OH)D3 SERPL-MCNC: 32.9 NG/ML (ref 30–100)
PTH-INTACT SERPL-MCNC: 88 PG/ML (ref 15–65)

## 2023-01-27 LAB — IGE SERPL-ACNC: 135 KU/L

## 2023-01-29 ENCOUNTER — PATIENT OUTREACH (OUTPATIENT)
Dept: CASE MANAGEMENT | Facility: OTHER | Age: 58
End: 2023-01-29
Payer: COMMERCIAL

## 2023-01-29 DIAGNOSIS — I48.0 PAROXYSMAL ATRIAL FIBRILLATION: ICD-10-CM

## 2023-01-29 DIAGNOSIS — E11.65 UNCONTROLLED TYPE 2 DIABETES MELLITUS WITH HYPERGLYCEMIA, WITH LONG-TERM CURRENT USE OF INSULIN: Primary | ICD-10-CM

## 2023-01-29 DIAGNOSIS — I50.32 CHRONIC DIASTOLIC (CONGESTIVE) HEART FAILURE: ICD-10-CM

## 2023-01-29 DIAGNOSIS — N18.31 STAGE 3A CHRONIC KIDNEY DISEASE: Primary | ICD-10-CM

## 2023-01-29 DIAGNOSIS — M54.42 CHRONIC BILATERAL LOW BACK PAIN WITH BILATERAL SCIATICA: ICD-10-CM

## 2023-01-29 DIAGNOSIS — E11.65 TYPE 2 DIABETES MELLITUS WITH HYPERGLYCEMIA, WITH LONG-TERM CURRENT USE OF INSULIN: ICD-10-CM

## 2023-01-29 DIAGNOSIS — G89.29 CHRONIC BILATERAL LOW BACK PAIN WITH BILATERAL SCIATICA: ICD-10-CM

## 2023-01-29 DIAGNOSIS — Z79.4 UNCONTROLLED TYPE 2 DIABETES MELLITUS WITH HYPERGLYCEMIA, WITH LONG-TERM CURRENT USE OF INSULIN: Primary | ICD-10-CM

## 2023-01-29 DIAGNOSIS — I10 ESSENTIAL (PRIMARY) HYPERTENSION: ICD-10-CM

## 2023-01-29 DIAGNOSIS — Z79.4 TYPE 2 DIABETES MELLITUS WITH HYPERGLYCEMIA, WITH LONG-TERM CURRENT USE OF INSULIN: ICD-10-CM

## 2023-01-29 DIAGNOSIS — M54.41 CHRONIC BILATERAL LOW BACK PAIN WITH BILATERAL SCIATICA: ICD-10-CM

## 2023-01-29 DIAGNOSIS — G62.9 PERIPHERAL POLYNEUROPATHY: ICD-10-CM

## 2023-01-29 PROCEDURE — 99439 CHRNC CARE MGMT STAF EA ADDL: CPT | Performed by: FAMILY MEDICINE

## 2023-01-29 PROCEDURE — 99490 CHRNC CARE MGMT STAFF 1ST 20: CPT | Performed by: FAMILY MEDICINE

## 2023-01-29 NOTE — OUTREACH NOTE
[facility] End of Month Documentation    This Chronic Medical Management Care Plan for Preston Wallis, 57 y.o. male, has been monitored and managed; reviewed and a new plan of care implemented for the month of January.  A cumulative time of 48  minutes was spent on this patient record this month, including phone call with care giver; electronic communication with other providers; electronic communication with primary care provider; electronic communication with pharmacist; chart review; phone call with pharmacist.    Regarding the patient's problems: has Chronic cough; Type 1 diabetes mellitus with diabetic chronic kidney disease ([facility]); Polyneuropathy; Polyneuropathy; Paroxysmal atrial fibrillation ([facility]); Obstructive sleep apnea; MRSA pneumonia ([facility]); Low back pain; Insomnia; Hypertension; Chronic diastolic heart failure ([facility]); Allergies; COPD exacerbation ([facility]); Chronic anticoagulation; Benign prostatic hyperplasia; Doyle catheter in place; Microscopic hematuria; Impaired mobility and endurance; Stage 3a chronic kidney disease ([facility]); Iron deficiency anemia secondary to inadequate dietary iron intake; Vitamin D deficiency; Class 3 severe obesity with serious comorbidity in adult ([facility]); Lower extremity edema; Elevated alkaline phosphatase level; Venous insufficiency (chronic) (peripheral); Tobacco abuse, in remission; History of Pseudomonas pneumonia; Shortness of breath; Gastroesophageal reflux disease; Bronchiectasis without complication ([facility]); Wheezing; Type 2 diabetes mellitus without complication ([facility]); Diabetes ([facility]); Abrasion; Acute renal failure syndrome ([facility]); Altered mental status; Anemia; Ankle fracture; Asthma; Charcot foot due to diabetes mellitus ([facility]); Chronic kidney disease; Closed supracondylar fracture of femur ([facility]); Congestive heart failure ([facility]); Deep vein thrombosis (DVT) of lower extremity associated with air travel ([facility]); Diabetic neuropathy ([facility]); Edema; Ex-smoker; Fall; Fracture of proximal humerus;  Hyperlipidemia; Hypertensive heart disease without congestive heart failure; Hypokalemia; Infected stasis ulcer of left lower extremity (HCC); Luetscher's syndrome; Neurogenic bladder; Class 1 obesity; Pneumonia due to Pseudomonas species (HCC); Proteinuria; Seizures (HCC); and Tinnitus on their problem list., the following items were addressed: medications; transitions to medical care; medical records; referrals to community service providers and any changes can be found within the plan section of the note.  A detailed listing of time spent for chronic care management is tracked within each outreach encounter.  Current medications include:  has a current medication list which includes the following prescription(s): albuterol sulfate hfa, apixaban, atorvastatin, b-d uf iii mini pen needles, bumetanide, bydureon bcise, calcium citrate, vitamin d3, freestyle clau 2 sensor, farxiga, mucus relief dm, docusate sodium, duloxetine, famotidine, ferrous gluconate, finasteride, folic acid, gabapentin, glucose blood, heparin, hydralazine, insulin lispro, isosorbide mononitrate, lantus solostar, losartan, melatonin-magnesium citrate, metoprolol tartrate, montelukast, nifedipine xl, o2, povidone-iodine, sodium chloride, tramadol, trazodone, and trueplus lancets 28g. and the patient is reported to be caregiver will take responsibility for med compliance,  Medications are reported to be non-effective in controlling symptoms and changes have been made to the medication protocol.  Regarding these diagnoses, referrals were made to the following provider(s):  specialists.  All notes on chart for PCP to review.    The patient was monitored remotely for pain; medications.    The patient's physical needs include:  help taking medications as prescribed; medication education; needs assistance with ADLs; physical healthcare; resources for disability needs; physician referral.     The patient's mental support needs include:  continued  support    The patient's cognitive support needs include:  medication; continued support; needs assistance with ADLs    The patient's psychosocial support needs include:  continued support; coordination of community providers; medication management or adherence    The patient's functional needs include: health care coverage; medication education; needs assistance for ADLs; physical healthcare; physician referral; resources for disability needs    The patient's environmental needs include:  resources for disability needs    Care Plan overall comments:  Still not at goal, however improving. will continue to follow.    Refer to previous outreach notes for more information on the areas listed above.    Monthly Billing Diagnoses  (E11.65,  Z79.4) Uncontrolled type 2 diabetes mellitus with hyperglycemia, with long-term current use of insulin (HCC)    (I48.0) Paroxysmal atrial fibrillation (HCC)    (I10) Essential (primary) hypertension    (I50.32) Chronic diastolic (congestive) heart failure (HCC)    Medications   · Medications have been reconciled    Care Plan progress this month:      Recently Modified Care Plans Updates made since 12/29/2022 12:00 AM    No recently modified care plans.          · Current Specialty Plan of Care Status signed by both patient and provider    Instructions   · Patient was provided an electronic copy of care plan  · CCM services were explained and offered and patient has accepted these services.  · Patient has given their written consent to receive CCM services and understands that this includes the authorization of electronic communication of medical information with the other treating providers.  · Patient understands that they may stop CCM services at any time and these changes will be effective at the end of the calendar month and will effectively revocate the agreement of CCM services.  · Patient understands that only one practitioner can furnish and be paid for CCM services during one  calendar month.  Patient also understands that there may be co-payment or deductible fees in association with CCM services.  · Patient will continue with at least monthly follow-up calls with the Ambulatory .    ALISSON GOMEZ  Ambulatory Case Management    1/29/2023, 10:44 EST

## 2023-01-30 RX ORDER — DAPAGLIFLOZIN 5 MG/1
TABLET, FILM COATED ORAL
Qty: 90 TABLET | Refills: 1 | Status: SHIPPED | OUTPATIENT
Start: 2023-01-30

## 2023-01-30 RX ORDER — LOSARTAN POTASSIUM 50 MG/1
50 TABLET ORAL DAILY
Qty: 90 TABLET | Refills: 1 | Status: SHIPPED | OUTPATIENT
Start: 2023-01-30 | End: 2023-02-02 | Stop reason: DRUGHIGH

## 2023-01-30 RX ORDER — GABAPENTIN 300 MG/1
CAPSULE ORAL
Qty: 90 CAPSULE | Refills: 2 | Status: SHIPPED | OUTPATIENT
Start: 2023-01-30

## 2023-01-31 DIAGNOSIS — N40.1 BENIGN PROSTATIC HYPERPLASIA WITH URINARY RETENTION: ICD-10-CM

## 2023-01-31 DIAGNOSIS — R33.8 BENIGN PROSTATIC HYPERPLASIA WITH URINARY RETENTION: ICD-10-CM

## 2023-01-31 RX ORDER — FINASTERIDE 5 MG/1
TABLET, FILM COATED ORAL
Qty: 90 TABLET | Refills: 1 | Status: SHIPPED | OUTPATIENT
Start: 2023-01-31

## 2023-02-02 ENCOUNTER — PATIENT OUTREACH (OUTPATIENT)
Dept: CASE MANAGEMENT | Facility: OTHER | Age: 58
End: 2023-02-02
Payer: COMMERCIAL

## 2023-02-02 DIAGNOSIS — E11.65 UNCONTROLLED TYPE 2 DIABETES MELLITUS WITH HYPERGLYCEMIA, WITH LONG-TERM CURRENT USE OF INSULIN: Primary | ICD-10-CM

## 2023-02-02 DIAGNOSIS — Z79.4 UNCONTROLLED TYPE 2 DIABETES MELLITUS WITH HYPERGLYCEMIA, WITH LONG-TERM CURRENT USE OF INSULIN: Primary | ICD-10-CM

## 2023-02-02 RX ORDER — LOSARTAN POTASSIUM 25 MG/1
25 TABLET ORAL EVERY MORNING
COMMUNITY
Start: 2023-01-23

## 2023-02-02 NOTE — OUTREACH NOTE
AMBULATORY CASE MANAGEMENT NOTE    Name and Relationship of Patient/Support Person:  -     Gómez dropped off medications to be filled by [facility].  Filled 21 days in advance.      Education Documentation  No documentation found.        ALISSON GOMEZ  Ambulatory Case Management    2/2/2023, 12:01 EST

## 2023-02-07 ENCOUNTER — OFFICE VISIT (OUTPATIENT)
Dept: FAMILY MEDICINE CLINIC | Age: 58
End: 2023-02-07
Payer: COMMERCIAL

## 2023-02-07 VITALS
HEART RATE: 94 BPM | HEIGHT: 69 IN | TEMPERATURE: 98.1 F | SYSTOLIC BLOOD PRESSURE: 140 MMHG | BODY MASS INDEX: 41.33 KG/M2 | OXYGEN SATURATION: 93 % | DIASTOLIC BLOOD PRESSURE: 74 MMHG

## 2023-02-07 DIAGNOSIS — J44.9 CHRONIC OBSTRUCTIVE PULMONARY DISEASE, UNSPECIFIED COPD TYPE: ICD-10-CM

## 2023-02-07 DIAGNOSIS — I50.32 CHRONIC DIASTOLIC HEART FAILURE: ICD-10-CM

## 2023-02-07 DIAGNOSIS — I12.9 TYPE 2 DM WITH CKD STAGE 3 AND HYPERTENSION: ICD-10-CM

## 2023-02-07 DIAGNOSIS — I48.0 PAROXYSMAL ATRIAL FIBRILLATION: ICD-10-CM

## 2023-02-07 DIAGNOSIS — E55.9 VITAMIN D DEFICIENCY: ICD-10-CM

## 2023-02-07 DIAGNOSIS — Z00.00 ANNUAL PHYSICAL EXAM: ICD-10-CM

## 2023-02-07 DIAGNOSIS — E11.22 TYPE 2 DM WITH CKD STAGE 3 AND HYPERTENSION: ICD-10-CM

## 2023-02-07 DIAGNOSIS — G62.9 POLYNEUROPATHY: ICD-10-CM

## 2023-02-07 DIAGNOSIS — Z79.899 LONG-TERM USE OF HIGH-RISK MEDICATION: Primary | ICD-10-CM

## 2023-02-07 DIAGNOSIS — N18.30 TYPE 2 DM WITH CKD STAGE 3 AND HYPERTENSION: ICD-10-CM

## 2023-02-07 DIAGNOSIS — Z79.4 TYPE 2 DIABETES MELLITUS WITH HYPERGLYCEMIA, WITH LONG-TERM CURRENT USE OF INSULIN: ICD-10-CM

## 2023-02-07 DIAGNOSIS — N31.9 NEUROGENIC BLADDER: ICD-10-CM

## 2023-02-07 DIAGNOSIS — E11.65 POORLY CONTROLLED TYPE 2 DIABETES MELLITUS WITH NEUROPATHY: ICD-10-CM

## 2023-02-07 DIAGNOSIS — M17.12 PRIMARY OSTEOARTHRITIS OF LEFT KNEE: ICD-10-CM

## 2023-02-07 DIAGNOSIS — E66.01 CLASS 3 SEVERE OBESITY DUE TO EXCESS CALORIES WITH SERIOUS COMORBIDITY AND BODY MASS INDEX (BMI) OF 40.0 TO 44.9 IN ADULT: ICD-10-CM

## 2023-02-07 DIAGNOSIS — E11.65 TYPE 2 DIABETES MELLITUS WITH HYPERGLYCEMIA, WITH LONG-TERM CURRENT USE OF INSULIN: ICD-10-CM

## 2023-02-07 DIAGNOSIS — D50.8 IRON DEFICIENCY ANEMIA SECONDARY TO INADEQUATE DIETARY IRON INTAKE: ICD-10-CM

## 2023-02-07 DIAGNOSIS — Z12.11 COLON CANCER SCREENING: ICD-10-CM

## 2023-02-07 DIAGNOSIS — M54.41 CHRONIC BILATERAL LOW BACK PAIN WITH BILATERAL SCIATICA: ICD-10-CM

## 2023-02-07 DIAGNOSIS — N18.32 STAGE 3B CHRONIC KIDNEY DISEASE (CKD): ICD-10-CM

## 2023-02-07 DIAGNOSIS — Z12.5 PROSTATE CANCER SCREENING: ICD-10-CM

## 2023-02-07 DIAGNOSIS — I10 ESSENTIAL (PRIMARY) HYPERTENSION: ICD-10-CM

## 2023-02-07 DIAGNOSIS — E11.40 TYPE 2 DIABETES MELLITUS WITH DIABETIC NEUROPATHY, WITH LONG-TERM CURRENT USE OF INSULIN: ICD-10-CM

## 2023-02-07 DIAGNOSIS — K59.09 OTHER CONSTIPATION: ICD-10-CM

## 2023-02-07 DIAGNOSIS — Z79.4 TYPE 2 DIABETES MELLITUS WITH DIABETIC NEUROPATHY, WITH LONG-TERM CURRENT USE OF INSULIN: ICD-10-CM

## 2023-02-07 DIAGNOSIS — E11.40 POORLY CONTROLLED TYPE 2 DIABETES MELLITUS WITH NEUROPATHY: ICD-10-CM

## 2023-02-07 DIAGNOSIS — M54.42 CHRONIC BILATERAL LOW BACK PAIN WITH BILATERAL SCIATICA: ICD-10-CM

## 2023-02-07 DIAGNOSIS — G89.29 CHRONIC BILATERAL LOW BACK PAIN WITH BILATERAL SCIATICA: ICD-10-CM

## 2023-02-07 PROBLEM — S82.899A ANKLE FRACTURE: Status: RESOLVED | Noted: 2023-01-13 | Resolved: 2023-02-07

## 2023-02-07 PROBLEM — D64.9 ANEMIA: Status: RESOLVED | Noted: 2022-05-12 | Resolved: 2023-02-07

## 2023-02-07 PROBLEM — R05.3 CHRONIC COUGH: Status: RESOLVED | Noted: 2021-10-19 | Resolved: 2023-02-07

## 2023-02-07 PROBLEM — I83.229 INFECTED STASIS ULCER OF LEFT LOWER EXTREMITY: Status: RESOLVED | Noted: 2023-01-13 | Resolved: 2023-02-07

## 2023-02-07 PROBLEM — J45.909 ASTHMA: Status: RESOLVED | Noted: 2023-01-13 | Resolved: 2023-02-07

## 2023-02-07 PROBLEM — Z97.8 FOLEY CATHETER IN PLACE: Status: RESOLVED | Noted: 2022-06-15 | Resolved: 2023-02-07

## 2023-02-07 PROBLEM — R60.9 EDEMA: Status: RESOLVED | Noted: 2023-01-13 | Resolved: 2023-02-07

## 2023-02-07 PROBLEM — N17.9 ACUTE RENAL FAILURE SYNDROME: Status: RESOLVED | Noted: 2022-01-12 | Resolved: 2023-02-07

## 2023-02-07 PROBLEM — R80.9 PROTEINURIA: Status: RESOLVED | Noted: 2022-10-28 | Resolved: 2023-02-07

## 2023-02-07 PROBLEM — N18.9 CHRONIC KIDNEY DISEASE: Status: RESOLVED | Noted: 2023-01-13 | Resolved: 2023-02-07

## 2023-02-07 PROBLEM — R31.29 MICROSCOPIC HEMATURIA: Status: RESOLVED | Noted: 2022-06-15 | Resolved: 2023-02-07

## 2023-02-07 PROBLEM — S42.209A FRACTURE OF PROXIMAL HUMERUS: Status: RESOLVED | Noted: 2023-01-13 | Resolved: 2023-02-07

## 2023-02-07 PROBLEM — E11.9 DIABETES: Status: RESOLVED | Noted: 2022-09-01 | Resolved: 2023-02-07

## 2023-02-07 PROBLEM — I82.409: Status: RESOLVED | Noted: 2023-01-13 | Resolved: 2023-02-07

## 2023-02-07 PROBLEM — S72.453A: Status: RESOLVED | Noted: 2022-01-12 | Resolved: 2023-02-07

## 2023-02-07 PROBLEM — T14.8XXA ABRASION: Status: RESOLVED | Noted: 2023-01-13 | Resolved: 2023-02-07

## 2023-02-07 PROBLEM — I50.9 CONGESTIVE HEART FAILURE: Status: RESOLVED | Noted: 2022-05-12 | Resolved: 2023-02-07

## 2023-02-07 PROBLEM — E11.9 TYPE 2 DIABETES MELLITUS WITHOUT COMPLICATION (HCC): Status: RESOLVED | Noted: 2020-08-25 | Resolved: 2023-02-07

## 2023-02-07 PROBLEM — I11.9 HYPERTENSIVE HEART DISEASE WITHOUT CONGESTIVE HEART FAILURE: Status: RESOLVED | Noted: 2023-01-13 | Resolved: 2023-02-07
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
## 2023-02-07 PROBLEM — Z87.891 EX-SMOKER: Status: RESOLVED | Noted: 2023-01-13 | Resolved: 2023-02-07

## 2023-02-07 PROBLEM — E87.6 HYPOKALEMIA: Status: RESOLVED | Noted: 2023-01-13 | Resolved: 2023-02-07

## 2023-02-07 PROBLEM — L97.929 INFECTED STASIS ULCER OF LEFT LOWER EXTREMITY: Status: RESOLVED | Noted: 2023-01-13 | Resolved: 2023-02-07

## 2023-02-07 PROBLEM — H93.19 TINNITUS: Status: RESOLVED | Noted: 2023-01-13 | Resolved: 2023-02-07

## 2023-02-07 PROBLEM — R06.2 WHEEZING: Status: RESOLVED | Noted: 2022-08-25 | Resolved: 2023-02-07

## 2023-02-07 PROCEDURE — 99396 PREV VISIT EST AGE 40-64: CPT | Performed by: FAMILY MEDICINE

## 2023-02-07 PROCEDURE — 80305 DRUG TEST PRSMV DIR OPT OBS: CPT | Performed by: FAMILY MEDICINE

## 2023-02-07 PROCEDURE — 3051F HG A1C>EQUAL 7.0%<8.0%: CPT | Performed by: FAMILY MEDICINE

## 2023-02-07 RX ORDER — INSULIN GLARGINE 100 [IU]/ML
INJECTION, SOLUTION SUBCUTANEOUS
Qty: 15 ML | Refills: 1 | Status: SHIPPED | OUTPATIENT
Start: 2023-02-07

## 2023-02-07 RX ORDER — DOCUSATE SODIUM 100 MG/1
100 CAPSULE, LIQUID FILLED ORAL 2 TIMES DAILY
Qty: 60 CAPSULE | Refills: 5 | Status: SHIPPED | OUTPATIENT
Start: 2023-02-07

## 2023-02-07 RX ORDER — IPRATROPIUM BROMIDE AND ALBUTEROL SULFATE 2.5; .5 MG/3ML; MG/3ML
SOLUTION RESPIRATORY (INHALATION)
COMMUNITY
Start: 2023-02-06 | End: 2023-02-09 | Stop reason: SDUPTHER

## 2023-02-07 NOTE — PROGRESS NOTES
Preston Wallis presents to [facility].    Chief Complaint:  Annual physical    Subjective       History of Present Illness:  HPI     Annual physical.  He is overall somewhat healthy, follows diabetic diet, avoids sweets. NO  ETOH use.  NO Tobacco use currently,  tobacco use-quit 30 years ago (smoked 1 1/2 PPD x 3.5 yrs).  Last colonoscopy  9/8/22 with 1 8 mm polyp and gastritis on EGD.  He has no prostate issues  With urinary retention and caths, saw urologist and prostate exam and PSA are UTD.  Immunizations: due for prevnar 20 covid booster and shingrix.    He needs to wear his seatbelt in the car.    PSA 0.725 on 3/17/2022    Gómez now has a diabetic bladder and will chronically need to be cathed.  He is tolerating the procedure well.  He sees urology Dr Cindy Magaña has congestive heart failure with preserved ejection fraction, diastolic dysfunction (in addition he has chronic lower extremity edema).  He is stable on current meds.  He is on bumex.  He is working closely with our care coordinator nurse Sima.  He is to avoid salt in his diet.  Chronic SOA.  Denies CP/LE edema is mild     He is on positive pressure CPAP for obstructive sleep apnea at home and he is compliant and tolerates well.     Iiron deficiency anemia and sees hematology Dr. Hanson.  He had 2 iron infusions  In past, tolerates ferrous gluconate, on stool softener for constipation that is stable and well controlled.  Colonoscopy and EGD are UTD, showed 1 8 mm polyp and gastritis, done by Dr Engel      Patient presents with type 1 diabetes mellitus with diabetic chronic kidney disease, neurogenic bladder and peripheral neuropathy.  Compliance with treatment has been great, takes meds as directed, on low carb diet but not able to exercise regimen.    Tobacco screen: Non-smoker/Past smoker.  Current meds include insulin/injectable ( SSI, lantus 40 units nightly ), bydureon and farxiga.  He does not perform  home blood glucose monitoring-he states they are < 150, checks BS twice a day.   Has had 1 fall recently-no injury.   In regard to preventative care, his last ophthalmology exam was in 4/2020 per patient memory-NEEDS THIS UPDATED.        Essential (primary) hypertension, his current cardiac medication regimen includes a diuretic (bumex ), a beta-blocker ( Metoprolol 100 mg twice daily ), CCB procardia, and hydralazine.  Compliance has been good with pill packs.  He is tolerating the medication well without side effects.  He has not kept a blood pressure diary, but states that pressures have been okay.           He has h/o hospitalization for pseudomonas pneumonia in past, and he gets recurrent infections and sees Dr Chavez and is on intermittent tobramycin nebulizer.  He is also on albuterol/duo nebs and symbicort daily, albuterol nebulizers       He has h/o PE and is on eliquis      H/o afib and is in NSR, on BB and eliquis, SEES DR MILLER     PN pain is worse with more RLS at night, he is on the gabapentin.       ONGOING  L knee pain, his pain is severe, he cant stand or balance due to pain, he has an ortho appt with Arti oropeza for knee replacement.          Review of Systems:  Review of Systems   Constitutional: Positive for fatigue. Negative for chills and fever.   HENT: Negative for congestion, ear discharge and sore throat.    Respiratory: Positive for shortness of breath and wheezing.    Cardiovascular: Negative for chest pain.   Gastrointestinal: Positive for constipation. Negative for abdominal pain, diarrhea, nausea, vomiting and GERD.   Genitourinary: Negative for flank pain.   Neurological: Positive for weakness. Negative for dizziness and headache.   Psychiatric/Behavioral: Negative for sleep disturbance, suicidal ideas and depressed mood. The patient is not nervous/anxious.         Objective   Medical History:  Past Medical History:   • Age-related cognitive decline   • Allergic contact dermatitis    • Allergies   • Anemia   • Bronchiectasis with acute lower respiratory infection ([facility])   • Charcot foot due to diabetes mellitus ([facility])   • Chronic diastolic (congestive) heart failure ([facility])   • Chronic kidney disease   • Chronic respiratory failure with hypoxia ([facility])   • Closed supracondylar fracture of femur ([facility])   • COPD (chronic obstructive pulmonary disease) ([facility])   • Deep vein thrombosis (DVT) of lower extremity associated with air travel ([facility])   • Dependence on supplemental oxygen   • Eczema   • Erectile dysfunction    due to organic reasons   • Essential (primary) hypertension   • Fracture    closed fracture of other tarsal and metatarsal bones   • Fracture of proximal humerus   • GERD without esophagitis   • High risk medication use   • Hypercholesteremia   • Hypomagnesemia   • Infected stasis ulcer of left lower extremity ([facility])   • Insomnia   • Low back pain   • Major depressive disorder   • Morbid (severe) obesity due to excess calories ([facility])   • MRSA pneumonia ([facility])   • Muscle weakness   • 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 without evidence of necrosis ([facility])   • Obstructive sleep apnea (adult) (pediatric)   • Other forms of dyspnea   • Other long term (current) drug therapy   • Other specified noninfective gastroenteritis and colitis   • Other spondylosis, lumbar region   • Pain in both knees   • Paroxysmal atrial fibrillation ([facility])   • Peripheral neuropathy    attributed to type 2 diabetes   • Pneumonia, unspecified organism   • Polyneuropathy   • Rash and other nonspecific skin eruption   • Syncope and collapse   • Tachycardia   • Tinnitus   • Type 1 diabetes mellitus with diabetic chronic kidney disease ([facility])   • Type 2 diabetes mellitus ([facility])   • Unspecified fall, initial encounter   • Urinary retention     Past Surgical History:   • CHOLECYSTECTOMY   • CYSTOSCOPY   • FEMUR SURGERY    Shravan placed   • KNEE SURGERY   • OTHER SURGICAL HISTORY    venous  port   • TONSILLECTOMY AND ADENOIDECTOMY      Family History   Problem Relation Age of Onset   • Coronary artery disease Mother    • Hypertension Mother    • Diabetes type II Mother    • Asthma Father    • Diabetes type II Sister    • Cancer Sister      Social History     Tobacco Use   • Smoking status: Former     Packs/day: 1.00     Years: 12.00     Pack years: 12.00     Types: Cigarettes     Start date:      Quit date:      Years since quittin.1   • Smokeless tobacco: Never   Substance Use Topics   • Alcohol use: Not Currently       There are no preventive care reminders to display for this patient.     Immunization History   Administered Date(s) Administered   • Flu Vaccine Quad PF >36MO 10/18/2016, 10/16/2017, 2019   • Flu Vaccine Split Quad 2019   • FluLaval/Fluzone >6mos 10/19/2022   • Influenza Injectable Mdck Pf Quad 10/19/2022   • Influenza Quad Vaccine (Inpatient) 2013   • Influenza, Unspecified 2020   • Pneumococcal Polysaccharide (PPSV23) 1997   • Tdap 2018       Allergies   Allergen Reactions   • Benadryl [Diphenhydramine] Itching   • Proventil [Albuterol] Other (See Comments)     Mouth sores          Medications:  Current Outpatient Medications on File Prior to Visit   Medication Sig   • albuterol sulfate  (90 Base) MCG/ACT inhaler Inhale 2 puffs 4 (Four) Times a Day As Needed for Wheezing.   • apixaban (Eliquis) 5 MG tablet tablet Take 1 tablet by mouth Every 12 (Twelve) Hours.   • atorvastatin (LIPITOR) 20 MG tablet Take 1 tablet by mouth Every Night.   • B-D UF III MINI PEN NEEDLES 31G X 5 MM misc USE TO INJECT insulin AS DIRECTED   • bumetanide (BUMEX) 2 MG tablet Take 1 tablet twice daily .  Call cardiology  if weight is great then 2 pounds in one day or 5 pounds in a week   • Bydureon BCise 2 MG/0.85ML auto-injector injection inject 2mg (one pen) UNDER THE SKIN into THE appropriate AREA AS DIRECTED one time PER WEEK   • calcium citrate  (CALCITRATE) 950 (200 Ca) MG tablet Take 1 tablet by mouth Daily.   • Cholecalciferol (Vitamin D3) 50 MCG (2000 UT) tablet Take 1 tablet BY MOUTH EVERY DAY   • Continuous Blood Gluc  (FreeStyle [place] 2 [place]) device    • Continuous Blood Gluc Sensor (FreeStyle Beth[place] 2 Sensor) misc 1 each Every 14 (Fourteen) Days.   • Dextromethorphan-guaiFENesin (Mucus Relief DM)  MG tablet sustained-release 12 hour Take 1 tablet BY MOUTH TWICE DAILY AS NEEDED FOR congestion   • DULoxetine (CYMBALTA) 60 MG capsule TAKE 1 CAPSULE BY MOUTH TWICE DAILY   • famotidine (PEPCID) 40 MG tablet Take 1 tablet by mouth Every Morning.   • Farxiga 5 MG tablet tablet TAKE 1 TABLET BY MOUTH ONCE DAILY   • ferrous gluconate (FERGON) 324 MG tablet Take 1 tablet by mouth Daily With Breakfast.   • finasteride (PROSCAR) 5 MG tablet Take 1 tablet BY MOUTH EVERY DAY   • Finerenone 10 MG tablet Take 10 mg by mouth.   • folic acid (FOLVITE) 1 MG tablet Take 1 tablet BY MOUTH EVERY DAY   • gabapentin (NEURONTIN) 300 MG capsule 1 po in am, 2 po qhs   • glucose blood test strip 1 each by Other route Daily. Dx:   E11.40   • heparin 100 UNIT/ML solution injection 5 mL by Intracatheter route Every 30 (Thirty) Days.   • hydrALAZINE (APRESOLINE) 50 MG tablet Take 1 tablet BY MOUTH TWICE DAILY   • Insulin Lispro (ADMELOG SOLOSTAR) 100 UNIT/ML injection pen Inject 8 Units under the skin into the appropriate area as directed 3 (Three) Times a Day. Per SSI, if BS <160 = 0 units, 161-220= 2 unit, 221-280= 4units, 281-340= 6units, 341-400= 8units  Dx: E11.40   • ipratropium-albuterol (DUO-NEB) 0.5-2.5 mg/3 ml nebulizer    • isosorbide mononitrate (IMDUR) 30 MG 24 hr tablet Take 1 tablet by mouth Daily.   • Lantus SoloStar 100 UNIT/ML injection pen INJECT 40 UNITS UNDER THE SKIN ONCE DAILY   • losartan (COZAAR) 25 MG tablet Take 25 mg by mouth Every Morning.   • Melatonin-Magnesium Citrate 1-71.5 MG tablet Take 1 tablet by mouth Every Night.   • metoprolol  "tartrate (LOPRESSOR) 100 MG tablet Take 1 tablet by mouth 2 (Two) Times a Day.   • montelukast (SINGULAIR) 10 MG tablet Take 1 tablet BY MOUTH EVERY night   • NIFEdipine XL (PROCARDIA XL) 30 MG 24 hr tablet Take 1 tablet by mouth Every Morning.   • O2 (OXYGEN) 2 Liter O2 - CONTINUOUS (route: Oxygen)   • povidone-iodine (Betadine) 10 % external solution Apply  topically to the appropriate area as directed As Needed for Wound Care.   • sodium chloride 0.9 % injection Infuse 10 mL into a venous catheter Every 30 (Thirty) Days. Standing order.  Port flush q 30 days - 5mL Heparin   • traMADol (ULTRAM) 50 MG tablet Take 1 tablet by mouth Every 6 (Six) Hours As Needed for Moderate Pain.   • TRUEplus Lancets 28G misc USE AS DIRECTED   • [DISCONTINUED] docusate sodium (COLACE) 100 MG capsule Take 1 capsule by mouth 2 (Two) Times a Day.     No current facility-administered medications on file prior to visit.       Vital Signs:   /74 (BP Location: Right arm, Patient Position: Sitting)   Pulse 94   Temp 98.1 °F (36.7 °C) (Oral)   Ht 175.3 cm (69.02\")   SpO2 93%   BMI 41.33 kg/m²       Physical Exam:  Physical Exam  Vitals and nursing note reviewed.   Constitutional:       General: He is not in acute distress.     Appearance: Normal appearance. He is obese. He is not ill-appearing, toxic-appearing or diaphoretic.   HENT:      Head: Normocephalic and atraumatic.      Right Ear: Tympanic membrane, ear canal and external ear normal.      Left Ear: Tympanic membrane, ear canal and external ear normal.      Nose: No congestion or rhinorrhea.      Mouth/Throat:      Mouth: Mucous membranes are moist.      Pharynx: Oropharynx is clear. No oropharyngeal exudate or posterior oropharyngeal erythema.   Eyes:      Extraocular Movements: Extraocular movements intact.      Conjunctiva/sclera: Conjunctivae normal.      Pupils: Pupils are equal, round, and reactive to light.   Cardiovascular:      Rate and Rhythm: Normal rate and " regular rhythm.      Pulses:           Dorsalis pedis pulses are 2+ on the right side and 2+ on the left side.      Heart sounds: Normal heart sounds.   Pulmonary:      Effort: Pulmonary effort is normal.      Breath sounds: Examination of the right-middle field reveals wheezing. Examination of the left-middle field reveals wheezing. Examination of the right-lower field reveals decreased breath sounds. Examination of the left-lower field reveals decreased breath sounds. Decreased breath sounds and wheezing present. No rhonchi or rales.      Comments: Pips and squeaks noted on exam today-sees pulm in 2 days.  Abdominal:      General: Abdomen is flat.      Palpations: Abdomen is soft.   Genitourinary:     Comments: PROSTATE EXAM BY DR LONG  Musculoskeletal:      Cervical back: Neck supple. No rigidity.      Right lower le+ Edema present.      Left lower le+ Edema present.      Right foot: Deformity present.   Feet:      Right foot:      Protective Sensation: 7 sites tested. 0 sites sensed.      Skin integrity: Callus and dry skin present. No ulcer or blister.      Toenail Condition: Right toenails are normal.      Left foot:      Protective Sensation: 7 sites tested. 0 sites sensed.      Skin integrity: Callus and dry skin present. No ulcer or blister.      Toenail Condition: Left toenails are normal.      Comments: Diabetic Foot Exam Performed and Monofilament Test Performed     Lymphadenopathy:      Cervical: No cervical adenopathy.   Skin:     General: Skin is warm and dry.   Neurological:      Mental Status: He is alert and oriented to person, place, and time.   Psychiatric:         Mood and Affect: Mood normal.         Behavior: Behavior normal.         Result Review      The following data was reviewed by Kimmy Riley MD on 2023.  Lab Results   Component Value Date    WBC 11.56 (H) 2023    HGB 9.8 (L) 2023    HCT 31.7 (L) 2023    MCV 89.3 2023     2023      Lab Results   Component Value Date    GLUCOSE 434 (C) 01/25/2023    BUN 31 (H) 01/25/2023    CREATININE 1.96 (H) 01/25/2023    EGFR 39.1 (L) 01/25/2023    BCR 15.8 01/25/2023    K 4.6 01/25/2023    CO2 33.8 (H) 01/25/2023    CALCIUM 9.1 01/25/2023    PROTENTOTREF 7.1 08/22/2022    ALBUMIN 3.0 08/22/2022    LABIL2 0.8 08/22/2022    AST 19 06/22/2022    ALT 20 06/22/2022     Lab Results   Component Value Date    CHOL 131 03/17/2022    CHLPL 165 08/26/2020    TRIG 69 03/17/2022    HDL 65 (H) 03/17/2022    LDL 52 03/17/2022     Lab Results   Component Value Date    TSH 0.580 03/17/2020     Lab Results   Component Value Date    HGBA1C 7.8 (H) 01/13/2023     Lab Results   Component Value Date    PSA 0.725 03/17/2022                       Assessment and Plan:          Diagnoses and all orders for this visit:    1. Long-term use of high-risk medication (Primary)  -     POC Urine Drug Screen [facility]    2. Annual physical exam    3. Type 2 diabetes mellitus with hyperglycemia, with long-term current use of insulin ([facility])  Comments:  he is under much better control!  taking meds as directed, cont low carb diet, referral for eye exam, foot exam noted above    4. Type 2 diabetes mellitus with diabetic neuropathy, with long-term current use of insulin ([facility])    5. Stage 3b chronic kidney disease (CKD) ([facility])  Comments:  chronic, f/u Dr Martínez as directed    6. Essential (primary) hypertension  Comments:  not at goal, to check BP at home and call if BP > 140/90    7. Type 2 DM with CKD stage 3 and hypertension ([facility])  -     Ambulatory Referral to Optometry    8. Paroxysmal atrial fibrillation ([facility])  Comments:  overall stable, no acute issues, cont current meds and treatment plan, f/u cardiology as directed    9. Chronic bilateral low back pain with bilateral sciatica  Comments:  today is his F2F, no changes needed in his current meds (tramadol/gabapentin) or treatment plan, no si/sx diversion or abuse    10. Vitamin D  deficiency  Comments:  cont OTC vitamin D supplementation    11. Chronic obstructive pulmonary disease, unspecified COPD type (HCC)  Comments:  stable, cont current pulm meds, f/u Dr Chavez as directed    12. Neurogenic bladder  Comments:  cont f/u with urology, cont I and O cathertization    13. Polyneuropathy  Comments:  cont diabetic shoes, today is his F2F-refills ok for gabapentin, he tolerates meds well and it controls his PN pain    14. Iron deficiency anemia secondary to inadequate dietary iron intake  Comments:  cont iron supplementation, tolerates meds well    15. Chronic diastolic heart failure (HCC)  Comments:  overall stable, no acute issues, cont current meds and treatment plan, f/u cardiology as directed    16. Class 3 severe obesity due to excess calories with serious comorbidity and body mass index (BMI) of 40.0 to 44.9 in adult ([facility])  Comments:  recc diet and wt loss, he defers nutritionist referral    17. Primary osteoarthritis of left knee  Comments:  will try him on topical diclofenac gel  Orders:  -     Diclofenac Sodium (VOLTAREN) 1 % gel gel; Apply 4 g topically to the appropriate area as directed 4 (Four) Times a Day As Needed (arthritis) for up to 30 days.  Dispense: 100 g; Refill: 2    18. Other constipation  Comments:  will restart him on colace stool softener  Orders:  -     docusate sodium (COLACE) 100 MG capsule; Take 1 capsule by mouth 2 (Two) Times a Day.  Dispense: 60 capsule; Refill: 5    19. Colon cancer screening  Comments:  UTD, colonoscopy reviewed with him today    20. Prostate cancer screening  Comments:  prostate exam UTD with urology, PSA lab ordered          Follow Up   No follow-ups on file.

## 2023-02-09 ENCOUNTER — TELEPHONE (OUTPATIENT)
Dept: PULMONOLOGY | Facility: CLINIC | Age: 58
End: 2023-02-09

## 2023-02-09 ENCOUNTER — OFFICE VISIT (OUTPATIENT)
Dept: PULMONOLOGY | Facility: CLINIC | Age: 58
End: 2023-02-09
Payer: COMMERCIAL

## 2023-02-09 VITALS
OXYGEN SATURATION: 95 % | HEIGHT: 69 IN | RESPIRATION RATE: 20 BRPM | SYSTOLIC BLOOD PRESSURE: 123 MMHG | HEART RATE: 76 BPM | DIASTOLIC BLOOD PRESSURE: 56 MMHG | BODY MASS INDEX: 41.33 KG/M2

## 2023-02-09 DIAGNOSIS — K21.9 GASTROESOPHAGEAL REFLUX DISEASE, UNSPECIFIED WHETHER ESOPHAGITIS PRESENT: ICD-10-CM

## 2023-02-09 DIAGNOSIS — Z87.01 HISTORY OF PSEUDOMONAS PNEUMONIA: ICD-10-CM

## 2023-02-09 DIAGNOSIS — R06.09 CHRONIC DYSPNEA: ICD-10-CM

## 2023-02-09 DIAGNOSIS — R06.2 WHEEZING: ICD-10-CM

## 2023-02-09 DIAGNOSIS — F17.201 TOBACCO ABUSE, IN REMISSION: ICD-10-CM

## 2023-02-09 DIAGNOSIS — R05.3 CHRONIC COUGH: ICD-10-CM

## 2023-02-09 DIAGNOSIS — J47.9 BRONCHIECTASIS WITHOUT COMPLICATION: ICD-10-CM

## 2023-02-09 DIAGNOSIS — Z86.711 PERSONAL HISTORY OF PE (PULMONARY EMBOLISM): ICD-10-CM

## 2023-02-09 DIAGNOSIS — G47.33 OBSTRUCTIVE SLEEP APNEA: Primary | ICD-10-CM

## 2023-02-09 PROCEDURE — 99214 OFFICE O/P EST MOD 30 MIN: CPT | Performed by: NURSE PRACTITIONER

## 2023-02-09 RX ORDER — IPRATROPIUM BROMIDE AND ALBUTEROL SULFATE 2.5; .5 MG/3ML; MG/3ML
3 SOLUTION RESPIRATORY (INHALATION) 4 TIMES DAILY PRN
Qty: 120 ML | Refills: 5 | Status: SHIPPED | OUTPATIENT
Start: 2023-02-09 | End: 2023-03-11

## 2023-02-09 RX ORDER — ALBUTEROL SULFATE 90 UG/1
2 AEROSOL, METERED RESPIRATORY (INHALATION) 4 TIMES DAILY PRN
Qty: 18 G | Refills: 11 | Status: SHIPPED | OUTPATIENT
Start: 2023-02-09

## 2023-02-09 RX ORDER — BUDESONIDE, GLYCOPYRROLATE, AND FORMOTEROL FUMARATE 160; 9; 4.8 UG/1; UG/1; UG/1
2 AEROSOL, METERED RESPIRATORY (INHALATION) 2 TIMES DAILY
Qty: 1 EACH | Refills: 11 | Status: SHIPPED | OUTPATIENT
Start: 2023-02-09 | End: 2023-03-11

## 2023-02-09 RX ORDER — BUDESONIDE, GLYCOPYRROLATE, AND FORMOTEROL FUMARATE 160; 9; 4.8 UG/1; UG/1; UG/1
2 AEROSOL, METERED RESPIRATORY (INHALATION) 2 TIMES DAILY
Qty: 4 EACH | Refills: 0 | COMMUNITY
Start: 2023-02-09 | End: 2023-02-10

## 2023-02-09 RX ORDER — SODIUM CHLORIDE FOR INHALATION 3 %
4 VIAL, NEBULIZER (ML) INHALATION
Qty: 240 ML | Refills: 5 | Status: SHIPPED | OUTPATIENT
Start: 2023-02-09 | End: 2023-03-11

## 2023-02-13 DIAGNOSIS — N18.32 STAGE 3B CHRONIC KIDNEY DISEASE (CKD): ICD-10-CM

## 2023-02-13 RX ORDER — FOLIC ACID 1 MG/1
TABLET ORAL
Qty: 90 TABLET | Refills: 1 | OUTPATIENT
Start: 2023-02-13

## 2023-02-15 DIAGNOSIS — M17.12 PRIMARY OSTEOARTHRITIS OF LEFT KNEE: ICD-10-CM

## 2023-02-16 ENCOUNTER — TELEPHONE (OUTPATIENT)
Dept: UROLOGY | Facility: CLINIC | Age: 58
End: 2023-02-16

## 2023-02-16 NOTE — TELEPHONE ENCOUNTER
Caller: RAO URIARTE            Patient is needing: PT R/S TODAYS 11:15 TO Tuesday 2/21 11:30

## 2023-02-21 ENCOUNTER — OFFICE VISIT (OUTPATIENT)
Dept: UROLOGY | Facility: CLINIC | Age: 58
End: 2023-02-21
Payer: COMMERCIAL

## 2023-02-21 VITALS
HEART RATE: 79 BPM | DIASTOLIC BLOOD PRESSURE: 61 MMHG | BODY MASS INDEX: 41.47 KG/M2 | HEIGHT: 69 IN | TEMPERATURE: 97.7 F | WEIGHT: 280 LBS | SYSTOLIC BLOOD PRESSURE: 131 MMHG

## 2023-02-21 DIAGNOSIS — R33.9 URINARY RETENTION: Primary | ICD-10-CM

## 2023-02-21 DIAGNOSIS — N13.8 BPH WITH OBSTRUCTION/LOWER URINARY TRACT SYMPTOMS: ICD-10-CM

## 2023-02-21 DIAGNOSIS — N31.2 NEUROGENIC BLADDER, FLACCID: ICD-10-CM

## 2023-02-21 DIAGNOSIS — N40.1 BPH WITH OBSTRUCTION/LOWER URINARY TRACT SYMPTOMS: ICD-10-CM

## 2023-02-21 LAB
BILIRUB BLD-MCNC: NEGATIVE MG/DL
CLARITY, POC: CLEAR
COLOR UR: YELLOW
EXPIRATION DATE: ABNORMAL
GLUCOSE UR STRIP-MCNC: ABNORMAL MG/DL
KETONES UR QL: NEGATIVE
LEUKOCYTE EST, POC: NEGATIVE
Lab: ABNORMAL
NITRITE UR-MCNC: NEGATIVE MG/ML
PH UR: 6 [PH] (ref 5–8)
PROT UR STRIP-MCNC: ABNORMAL MG/DL
RBC # UR STRIP: NEGATIVE /UL
SP GR UR: 1.01 (ref 1–1.03)
UROBILINOGEN UR QL: ABNORMAL

## 2023-02-21 PROCEDURE — 99213 OFFICE O/P EST LOW 20 MIN: CPT | Performed by: UROLOGY

## 2023-02-21 NOTE — PROGRESS NOTES
"  Chief Complaint  Follow-up and Urinary Retention    Diabetic neurogenic bladder    BPH    Subjective  Patient is in no pain        Preston Wallis presents to [facility]  History of Present Illness    Patient has diabetic neurogenic bladder with no detrusor contraction.  Patient is in urinary retention and his wife catheterizes him 5-6 times a day.  No recent history of urinary tract infection.    Patient notes some feeling of fullness in her bladder gets full urinates small amounts using Crede.    Patient is diabetic with glycosuria and also has severe COPD    Objective No acute distress  Vital Signs:   /61   Pulse 79   Temp 97.7 °F (36.5 °C)   Ht 175.3 cm (69.02\")   Wt 127 kg (280 lb)   BMI 41.32 kg/m²     Allergies   Allergen Reactions   • Benadryl [Diphenhydramine] Itching   • Proventil [Albuterol] Other (See Comments)     Mouth sores        Past medical history:  has a past medical history of Age-related cognitive decline, Allergic contact dermatitis, Allergies, Anemia, Bronchiectasis with acute lower respiratory infection ([facility]), Charcot foot due to diabetes mellitus ([facility]) (9/10/2013), Chronic diastolic (congestive) heart failure ([facility]), Chronic kidney disease, Chronic respiratory failure with hypoxia ([facility]), Closed supracondylar fracture of femur ([facility]) (1/12/2022), COPD (chronic obstructive pulmonary disease) ([facility]), Deep vein thrombosis (DVT) of lower extremity associated with air travel ([facility]) (1/13/2023), Dependence on supplemental oxygen, Eczema, Erectile dysfunction, Essential (primary) hypertension, Fracture, Fracture of proximal humerus (1/13/2023), GERD without esophagitis, High risk medication use, Hypercholesteremia, Hypomagnesemia, Infected stasis ulcer of left lower extremity ([facility]) (1/13/2023), Insomnia, Low back pain, Major depressive disorder, Morbid (severe) obesity due to excess calories ([facility]), MRSA pneumonia ([facility]), Muscle weakness, Non-pressure chronic ulcer of " other part of unspecified foot with bone involvement without evidence of necrosis (HCC), Obstructive sleep apnea (adult) (pediatric), Other forms of dyspnea, Other long term (current) drug therapy, Other specified noninfective gastroenteritis and colitis, Other spondylosis, lumbar region, Pain in both knees, Paroxysmal atrial fibrillation (HCC), Peripheral neuropathy, Pneumonia, unspecified organism, Polyneuropathy, Rash and other nonspecific skin eruption, Syncope and collapse, Tachycardia, Tinnitus (1/13/2023), Type 1 diabetes mellitus with diabetic chronic kidney disease (HCC), Type 2 diabetes mellitus (HCC), Unspecified fall, initial encounter, and Urinary retention.   Past surgical history:  has a past surgical history that includes Cholecystectomy; tonsillectomy and adenoidectomy; Knee surgery (Left); Other surgical history (Left); Cystoscopy; and Femur Surgery (Left).  Personal history: family history includes Asthma in his father; Cancer in his sister; Coronary artery disease in his mother; Diabetes type II in his mother and sister; Hypertension in his mother.  Social history:  reports that he quit smoking about 30 years ago. His smoking use included cigarettes. He started smoking about 42 years ago. He has a 12.00 pack-year smoking history. He has never used smokeless tobacco. He reports that he does not currently use alcohol. He reports that he does not use drugs.    Review of Systems    Please see past medical surgical history and rest of the system is negative    Physical Exam  Constitutional:       General: He is not in acute distress.     Appearance: Normal appearance. He is obese. He is not ill-appearing or toxic-appearing.      Comments: Patient is on continuous oxygen   HENT:      Head: Normocephalic and atraumatic.      Ears:      Comments: No loss of hearing  Cardiovascular:      Rate and Rhythm: Normal rate and regular rhythm.      Heart sounds: Normal heart sounds. No murmur heard.  Pulmonary:       Effort: Pulmonary effort is normal.      Breath sounds: Normal breath sounds. No rhonchi or rales.   Abdominal:      Palpations: Abdomen is soft. There is no mass.      Tenderness: There is no abdominal tenderness. There is no right CVA tenderness or left CVA tenderness.   Genitourinary:     Penis: Normal.       Testes: Normal.      Comments: Right and left scrotum is normal and nontender  Musculoskeletal:      Cervical back: Normal range of motion and neck supple. No rigidity or tenderness.      Comments: Patient is on a wheelchair and cannot walk.  Weakness of lower extremities   Lymphadenopathy:      Cervical: No cervical adenopathy.   Skin:     General: Skin is warm.      Coloration: Skin is not jaundiced.   Neurological:      General: No focal deficit present.      Mental Status: He is alert and oriented to person, place, and time.      Motor: Weakness present.      Gait: Gait abnormal.   Psychiatric:         Mood and Affect: Mood normal.         Behavior: Behavior normal.         Thought Content: Thought content normal.         Judgment: Judgment normal.        Result Review :                 Assessment and Plan    Diagnoses and all orders for this visit:    1. Urinary retention (Primary)  -     POC Urinalysis Dipstick, Automated    2. BPH with obstruction/lower urinary tract symptoms    3. Neurogenic bladder, flaccid    Patient is severely diabetic and is on Farxiga.  Pelvic hygiene discussed.  Patient has no way to take a shower at this time but he needs to have a shower every day clean his scrotum and penis to prevent him getting gangrene of scrotum or perineum.  Discussed with the patient and the wife.    Patient is doing very well with intermittent catheterization and I will recheck him in 6 months time     Brief Urine Lab Results  (Last result in the past 365 days)      Color   Clarity   Blood   Leuk Est   Nitrite   Protein   CREAT   Urine HCG        02/21/23 1054 Yellow   Clear   Negative    Negative   Negative   100 mg/dL                  Follow Up   No follow-ups on file.  Patient was given instructions and counseling regarding his condition or for health maintenance advice. Please see specific information pulled into the AVS if appropriate.     Teresita White MD

## 2023-02-23 ENCOUNTER — PATIENT OUTREACH (OUTPATIENT)
Dept: CASE MANAGEMENT | Facility: OTHER | Age: 58
End: 2023-02-23
Payer: COMMERCIAL

## 2023-02-23 DIAGNOSIS — Z79.4 UNCONTROLLED TYPE 2 DIABETES MELLITUS WITH HYPERGLYCEMIA, WITH LONG-TERM CURRENT USE OF INSULIN: Primary | ICD-10-CM

## 2023-02-23 DIAGNOSIS — E11.65 UNCONTROLLED TYPE 2 DIABETES MELLITUS WITH HYPERGLYCEMIA, WITH LONG-TERM CURRENT USE OF INSULIN: Primary | ICD-10-CM

## 2023-02-23 DIAGNOSIS — G62.9 PERIPHERAL POLYNEUROPATHY: ICD-10-CM

## 2023-02-23 DIAGNOSIS — G89.29 CHRONIC BILATERAL LOW BACK PAIN WITH BILATERAL SCIATICA: ICD-10-CM

## 2023-02-23 DIAGNOSIS — N18.31 STAGE 3A CHRONIC KIDNEY DISEASE: ICD-10-CM

## 2023-02-23 DIAGNOSIS — M54.41 CHRONIC BILATERAL LOW BACK PAIN WITH BILATERAL SCIATICA: ICD-10-CM

## 2023-02-23 DIAGNOSIS — M54.42 CHRONIC BILATERAL LOW BACK PAIN WITH BILATERAL SCIATICA: ICD-10-CM

## 2023-02-23 PROCEDURE — 99439 CHRNC CARE MGMT STAF EA ADDL: CPT | Performed by: FAMILY MEDICINE

## 2023-02-23 PROCEDURE — 99490 CHRNC CARE MGMT STAFF 1ST 20: CPT | Performed by: FAMILY MEDICINE

## 2023-02-23 NOTE — OUTREACH NOTE
AMBULATORY CASE MANAGEMENT NOTE    Name and Relationship of Patient/Support Person: Preston Wallis - Self    Gómez/elizabeth dropped off medication yesterday.  Was able to fill 17 days only with the amount of medication that I was supplied.  Informed Elizabeth.  All meds had refills remaining.       Tara R  Ambulatory Case Management    2/23/2023, 13:53 EST     [facility] End of Month Documentation    This Chronic Medical Management Care Plan for Preston Wallis, 57 y.o. male, has been monitored and managed; reviewed and a new plan of care implemented for the month of February.  A cumulative time of 40  minutes was spent on this patient record this month, including phone call with care giver; electronic communication with other providers; electronic communication with primary care provider; electronic communication with pharmacist; chart review; phone call with pharmacist.    Regarding the patient's problems: has Polyneuropathy; Paroxysmal atrial fibrillation ([facility]); Obstructive sleep apnea; MRSA pneumonia ([facility]); Low back pain; Chronic diastolic heart failure ([facility]); Allergies; COPD exacerbation ([facility]); Chronic anticoagulation; Benign prostatic hyperplasia; Impaired mobility and endurance; Stage 3a chronic kidney disease (HCC); Iron deficiency anemia secondary to inadequate dietary iron intake; Vitamin D deficiency; Class 3 severe obesity with serious comorbidity in adult (HCC); Lower extremity edema; Elevated alkaline phosphatase level; Venous insufficiency (chronic) (peripheral); Tobacco abuse, in remission; History of Pseudomonas pneumonia; Chronic dyspnea; Gastroesophageal reflux disease; Bronchiectasis without complication (HCC); Altered mental status; Hyperlipidemia; Luetscher's syndrome; Neurogenic bladder; Class 1 obesity; Pneumonia due to Pseudomonas species ([facility]); Seizures ([facility]); Primary osteoarthritis of left knee; Other constipation; Chronic obstructive pulmonary disease (HCC); Type 2 DM with CKD stage 3 and hypertension  ([facility]); Essential (primary) hypertension; Stage 3b chronic kidney disease (CKD) ([facility]); Annual physical exam; Long-term use of high-risk medication; and Personal history of PE (pulmonary embolism) on their problem list., the following items were addressed: medications; transitions to medical care; medical records; referrals to community service providers and any changes can be found within the plan section of the note.  A detailed listing of time spent for chronic care management is tracked within each outreach encounter.  Current medications include:  has a current medication list which includes the following prescription(s): albuterol sulfate hfa, apixaban, atorvastatin, b-d uf iii mini pen needles, breztri aerosphere, bumetanide, bydureon bcise, calcium citrate, vitamin d3, freestyle clau 2 reader, freestyle clau 2 sensor, mucus relief dm, diclofenac sodium, docusate sodium, duloxetine, famotidine, farxiga, ferrous gluconate, finasteride, finerenone, folic acid, gabapentin, glucose blood, heparin, hydralazine, insulin lispro, ipratropium-albuterol, isosorbide mononitrate, lantus solostar, losartan, melatonin-magnesium citrate, metoprolol tartrate, montelukast, nifedipine xl, o2, povidone-iodine, sodium chloride, sodium chloride, tramadol, and trueplus lancets 28g. and the patient is reported to be caregiver will take responsibility for med compliance,  Medications are reported to be non-effective in controlling symptoms and changes have been made to the medication protocol.  Regarding these diagnoses, referrals were made to the following provider(s):  specialists.  All notes on chart for PCP to review.    The patient was monitored remotely for pain; medications.    The patient's physical needs include:  help taking medications as prescribed; medication education; needs assistance with ADLs; physical healthcare; resources for disability needs; physician referral.     The patient's mental support needs include:   continued support    The patient's cognitive support needs include:  medication; continued support; needs assistance with ADLs    The patient's psychosocial support needs include:  continued support; coordination of community providers; medication management or adherence    The patient's functional needs include: health care coverage; medication education; needs assistance for ADLs; physical healthcare; physician referral; resources for disability needs    The patient's environmental needs include:  resources for disability needs    Care Plan overall comments:  Still not at goal, however improving. will continue to follow.    Refer to previous outreach notes for more information on the areas listed above.    Monthly Billing Diagnoses  (E11.65,  Z79.4) Uncontrolled type 2 diabetes mellitus with hyperglycemia, with long-term current use of insulin (HCC)    (M54.42,  M54.41,  G89.29) Chronic bilateral low back pain with bilateral sciatica    (N18.31) Stage 3a chronic kidney disease (HCC)    (G62.9) Peripheral polyneuropathy    Medications   · Medications have been reconciled    Care Plan progress this month:      Recently Modified Care Plans Updates made since 1/23/2023 12:00 AM    No recently modified care plans.          · Current Specialty Plan of Care Status signed by both patient and provider    Instructions   · Patient was provided an electronic copy of care plan  · CCM services were explained and offered and patient has accepted these services.  · Patient has given their written consent to receive CCM services and understands that this includes the authorization of electronic communication of medical information with the other treating providers.  · Patient understands that they may stop CCM services at any time and these changes will be effective at the end of the calendar month and will effectively revocate the agreement of CCM services.  · Patient understands that only one practitioner can furnish and be paid  for CCM services during one calendar month.  Patient also understands that there may be co-payment or deductible fees in association with CCM services.  · Patient will continue with at least monthly follow-up calls with the Ambulatory .    Tara GOMEZ  Ambulatory Case Management    2/23/2023, 14:04 EST

## 2023-02-25 DIAGNOSIS — D50.8 IRON DEFICIENCY ANEMIA SECONDARY TO INADEQUATE DIETARY IRON INTAKE: ICD-10-CM

## 2023-02-27 RX ORDER — DOXYCYCLINE HYCLATE 50 MG/1
CAPSULE, GELATIN COATED ORAL
Qty: 90 TABLET | Refills: 1 | Status: SHIPPED | OUTPATIENT
Start: 2023-02-27

## 2023-03-06 ENCOUNTER — PATIENT OUTREACH (OUTPATIENT)
Dept: CASE MANAGEMENT | Facility: OTHER | Age: 58
End: 2023-03-06
Payer: COMMERCIAL

## 2023-03-06 DIAGNOSIS — F51.01 PRIMARY INSOMNIA: ICD-10-CM

## 2023-03-06 DIAGNOSIS — E11.65 UNCONTROLLED TYPE 2 DIABETES MELLITUS WITH HYPERGLYCEMIA, WITH LONG-TERM CURRENT USE OF INSULIN: Primary | ICD-10-CM

## 2023-03-06 DIAGNOSIS — Z79.4 UNCONTROLLED TYPE 2 DIABETES MELLITUS WITH HYPERGLYCEMIA, WITH LONG-TERM CURRENT USE OF INSULIN: Primary | ICD-10-CM

## 2023-03-06 RX ORDER — TRAZODONE HYDROCHLORIDE 100 MG/1
100 TABLET ORAL NIGHTLY
Qty: 90 TABLET | Refills: 1 | OUTPATIENT
Start: 2023-03-06

## 2023-03-06 NOTE — OUTREACH NOTE
AMBULATORY CASE MANAGEMENT NOTE    Name and Relationship of Patient/Support Person: Kami Wallis - Emergency Contact    Called to check in with Elizabeth and Gómez.  Unfortunately they were without electricity and a neighbor let them borrow generator for his oxygen.  They will be in this week for medication refill.     Education Documentation  No documentation found.        Tara GOMEZ  Ambulatory Case Management    3/6/2023, 14:11 EST

## 2023-03-08 ENCOUNTER — PATIENT OUTREACH (OUTPATIENT)
Dept: CASE MANAGEMENT | Facility: OTHER | Age: 58
End: 2023-03-08
Payer: COMMERCIAL

## 2023-03-08 DIAGNOSIS — E11.65 UNCONTROLLED TYPE 2 DIABETES MELLITUS WITH HYPERGLYCEMIA, WITH LONG-TERM CURRENT USE OF INSULIN: Primary | ICD-10-CM

## 2023-03-08 DIAGNOSIS — Z79.4 UNCONTROLLED TYPE 2 DIABETES MELLITUS WITH HYPERGLYCEMIA, WITH LONG-TERM CURRENT USE OF INSULIN: Primary | ICD-10-CM

## 2023-03-08 NOTE — OUTREACH NOTE
"AMBULATORY CASE MANAGEMENT NOTE    Name and Relationship of Patient/Support Person: Preston Wallis \"Gómez\" - Self    Gómez/chaim brought in medication to fill.  Missing Eliquis and Famotidine.  Called pharmacy and dispensed to patient.  Will need to add to daily medication. Called pharmacy to follow up on finerenone, PA send to nephrology, called nephrology to follow up, left message to return call.     Education Documentation  No documentation found.        Tara GOMEZ  Ambulatory Case Management    3/8/2023, 15:19 EST  "

## 2023-03-12 DIAGNOSIS — E55.9 VITAMIN D DEFICIENCY: ICD-10-CM

## 2023-03-13 RX ORDER — CHOLECALCIFEROL (VITAMIN D3) 125 MCG
CAPSULE ORAL
Qty: 90 TABLET | Refills: 1 | Status: SHIPPED | OUTPATIENT
Start: 2023-03-13

## 2023-03-15 DIAGNOSIS — M25.562 LEFT KNEE PAIN, UNSPECIFIED CHRONICITY: Primary | ICD-10-CM

## 2023-03-23 ENCOUNTER — OFFICE VISIT (OUTPATIENT)
Dept: ORTHOPEDIC SURGERY | Facility: CLINIC | Age: 58
End: 2023-03-23
Payer: COMMERCIAL

## 2023-03-23 ENCOUNTER — HOSPITAL ENCOUNTER (OUTPATIENT)
Dept: GENERAL RADIOLOGY | Facility: HOSPITAL | Age: 58
Discharge: HOME | End: 2023-03-23
Admitting: ORTHOPAEDIC SURGERY
Payer: COMMERCIAL

## 2023-03-23 VITALS — TEMPERATURE: 98.6 F | BODY MASS INDEX: 42.95 KG/M2 | HEIGHT: 69 IN | WEIGHT: 290 LBS

## 2023-03-23 DIAGNOSIS — M25.561 ACUTE PAIN OF RIGHT KNEE: Primary | ICD-10-CM

## 2023-03-23 DIAGNOSIS — M17.11 PRIMARY OSTEOARTHRITIS OF RIGHT KNEE: ICD-10-CM

## 2023-03-23 DIAGNOSIS — G89.29 CHRONIC PAIN OF LEFT KNEE: Primary | ICD-10-CM

## 2023-03-23 DIAGNOSIS — M25.562 LEFT KNEE PAIN, UNSPECIFIED CHRONICITY: ICD-10-CM

## 2023-03-23 DIAGNOSIS — I50.32 CHRONIC DIASTOLIC (CONGESTIVE) HEART FAILURE: ICD-10-CM

## 2023-03-23 DIAGNOSIS — S72.92XD ENCOUNTER FOR AFTERCARE FOR HEALING CLOSED TRAUMATIC FRACTURE OF LEFT FEMUR: ICD-10-CM

## 2023-03-23 DIAGNOSIS — M25.562 CHRONIC PAIN OF LEFT KNEE: Primary | ICD-10-CM

## 2023-03-23 DIAGNOSIS — M25.561 ACUTE PAIN OF RIGHT KNEE: ICD-10-CM

## 2023-03-23 PROCEDURE — 99204 OFFICE O/P NEW MOD 45 MIN: CPT | Performed by: ORTHOPAEDIC SURGERY

## 2023-03-23 PROCEDURE — 73560 X-RAY EXAM OF KNEE 1 OR 2: CPT

## 2023-03-23 PROCEDURE — 73562 X-RAY EXAM OF KNEE 3: CPT

## 2023-03-23 RX ORDER — BUMETANIDE 2 MG/1
TABLET ORAL
Qty: 180 TABLET | Refills: 1 | Status: SHIPPED | OUTPATIENT
Start: 2023-03-23

## 2023-03-23 NOTE — PROGRESS NOTES
Chief Complaint  Pain and Establish Care of the Right Knee and Pain and Establish Care of the Left Knee    Subjective    History of Present Illness      Preston Wallis is a 57 y.o. male who presents to [facility] for right knee pain and discomfort and aftercare for left femoral diaphyseal fracture.  History of Present Illness this patient has a very complex medical and a very complex orthopedic history.  He was injured in 2021 December when he fell at home and sustained a supracondylar fracture of his left femur.  He was transported to [facility] fracture service where he underwent open reduction internal fixation with an intramedullary judith.  He has done quite well from that surgical intervention.  There are no hardware related complication.  The femur fracture appears to be healing appropriately.  He states that both his knees are hurting quite a bit although the right mom is a little more symptomatic than the left one.  His mobility is significantly impaired and he is essentially bound to a wheelchair.  He finds it very difficult to get up and ambulate independently.  He does have a history of COPD and now has become dependent on oxygen as well.  He states that his pain is 7 on a scale of 1-10 and it is moderately severe.  There is associated swelling of the lower extremities as well.  The femoral fracture itself appears to be healing well and no further surgical procedures are proposed for this patient.  He does have a history of chronic kidney disease most likely resulting from his diabetes.  The patient is also morbidly obese and weighs 290 pounds.  Pain Location:  BILATERAL knee  Radiation: none  Quality: dull, aching  Intensity/Severity: mild-moderate  Duration: Several months  Progression of symptoms: no worsening, symptoms stable/unchanged  Onset quality: gradual   Timing: intermittent  Aggravating Factors: kneeling, rising after sitting,  "squatting  Alleviating Factors: NSAIDs  Previous Episodes: yes  Associated Symptoms: pain, swelling, clicking/popping  ADLs Affected: ambulating  Previous Treatment: NSAIDs and prior surgery       Objective   Vital Signs:   Temp 98.6 °F (37 °C)   Ht 175.3 cm (69\")   Wt 132 kg (290 lb)   BMI 42.83 kg/m²     Physical Exam  Physical Exam  Vitals signs and nursing note reviewed.   Constitutional:       Appearance: Normal appearance.   Pulmonary:      Effort: Pulmonary effort is normal.   Skin:     General: Skin is warm and dry.      Capillary Refill: Capillary refill takes less than 2 seconds.   Neurological:      General: No focal deficit present.      Mental Status: He is alert and oriented to person, place, and time. Mental status is at baseline.   Psychiatric:         Mood and Affect: Mood normal.         Behavior: Behavior normal.         Thought Content: Thought content normal.         Judgment: Judgment normal.     Ortho Exam   Left knee (varus). Patient has crepitus throughout range of motion. Positive patellar grind test. Mild effusion. Lachman is negative. Pivot shift is negative. Anterior and posterior drawer signs are negative. Significant joint line tenderness is noted on the medial aspect of the knee. Patient has a varus orientation of the knee. There is fullness and tenderness in the Popliteal fossa. Mild distention of a Popliteal cyst is noted in this location. Range of motion in flexion is from 0-90 degrees. Neurovascular status is intact.  Dorsalis pedis and posterior tibial artery pulses are palpable. Common peroneal nerve function is well preserved. Patient's gait is cautious and antalgic. Skin and soft tissues are mildly swollen, consistent with synovitis and effusion. The patient has a significant limp with the first few steps after starting the gait cycle. Getting out of a chair takes a lot of effort due to pain on knee flexion.       Left femur-ORIF. The patient is postop status post orif of a " femoral diaphyseal fracture 16 month(s). Incisions are clean. Calf is soft and nontender. Agustín's sign is negative. Patient has been appropriately anticoagulated. There is no limb length discrepancy. No hardware related problems are noted. Greater trochanter is somewhat tender. IT band is painful and tender for the patient. Hip flexion is 0-30 degrees, hip abduction is 0 to 30 degrees. Dorsalis pedis and posterior tibial artery pulses are palpable. Common peroneal nerve function is well preserved.         Result Review :   The following data was reviewed by: Rashad Chi MD on 03/23/2023:    xrays obtained today  bilateral Knee X-Ray  Indication: Evaluation of healing of the supracondylar femur fracture on the left side and because of pain on the right knee.  AP, Lateral views  Findings: The supracondylar fracture is in an acceptable alignment.  Callus formation is noted.  There is no secondary displacement.  There is advanced degenerative change posttraumatic involving the patellofemoral articulation as well as the tibiofemoral joint.  The knee joint on the contralateral side also shows advanced osteoarthritis with bone-on-bone appearance.  no bony lesion  Soft tissues within normal limits  decreased joint spaces  Hardware appropriately positioned yes      no prior studies available for comparison.    This patient's x-ray report was graded according to the Kellgren and Hank classification.  This took into account the joint space narrowing, osteophyte formation, sclerosis of the distal femur/proximal tibia along with deformity of those bones.  The findings were indicative of K L grade 3.    X-RAY was ordered and reviewed by Rashad Chi MD          Procedures           Assessment   Assessment and Plan    Diagnoses and all orders for this visit:    1. Chronic pain of left knee (Primary)    2. Encounter for aftercare for healing closed traumatic fracture of left femur    3. Primary osteoarthritis of right  knee          Follow Up   · Compression/brace to the knee to prevent it from buckling and giving out.  · Calcium and vitamin D for bone health.  · Glucosamine, chondroitin and turmeric for cartilage health.  · Intra-articular steroid injection and viscosupplementation injections discussed with the patient.  · There is no indication for removal of the judith at this point.  · Rest, ice, compression, and elevation (RICE) therapy  · Stretching and strengthening exercises of the quads and hamstrings.  · With his multiple medical issues he is definitely not a candidate for knee replacement surgery up until the point that he can lose weight and become healthier from a medical standpoint.  · OTC Tylenol 500-1000mg by mouth every 6 hours as needed for pain   · Follow up in 12 month(s)  • Patient was given instructions and counseling regarding his condition or for health maintenance advice. Please see specific information pulled into the AVS if appropriate.     Rashad Chi MD   Date of Encounter: 3/23/2023       EMR Dragon/Transcription disclaimer:  Much of this encounter note is an electronic transcription/translation of spoken language to printed text. The electronic translation of spoken language may permit erroneous, or at times, nonsensical words or phrases to be inadvertently transcribed; Although I have reviewed the note for such errors, some may still exist.

## 2023-03-28 ENCOUNTER — PATIENT OUTREACH (OUTPATIENT)
Dept: CASE MANAGEMENT | Facility: OTHER | Age: 58
End: 2023-03-28
Payer: COMMERCIAL

## 2023-03-28 DIAGNOSIS — Z79.4 UNCONTROLLED TYPE 2 DIABETES MELLITUS WITH HYPERGLYCEMIA, WITH LONG-TERM CURRENT USE OF INSULIN: Primary | ICD-10-CM

## 2023-03-28 DIAGNOSIS — G62.9 PERIPHERAL POLYNEUROPATHY: ICD-10-CM

## 2023-03-28 DIAGNOSIS — N18.31 STAGE 3A CHRONIC KIDNEY DISEASE: ICD-10-CM

## 2023-03-28 DIAGNOSIS — I50.32 CHRONIC DIASTOLIC (CONGESTIVE) HEART FAILURE: ICD-10-CM

## 2023-03-28 DIAGNOSIS — E11.65 UNCONTROLLED TYPE 2 DIABETES MELLITUS WITH HYPERGLYCEMIA, WITH LONG-TERM CURRENT USE OF INSULIN: Primary | ICD-10-CM

## 2023-03-28 PROCEDURE — 99490 CHRNC CARE MGMT STAFF 1ST 20: CPT | Performed by: FAMILY MEDICINE

## 2023-03-28 PROCEDURE — 99439 CHRNC CARE MGMT STAF EA ADDL: CPT | Performed by: FAMILY MEDICINE

## 2023-03-28 NOTE — OUTREACH NOTE
"AMBULATORY CASE MANAGEMENT NOTE    Name and Relationship of Patient/Support Person: Preston Wallis \"Gómez\" - Self    Gómez brought in his medications.  Only had enough for for 6 days to plan. Called pharmacy to confirm he had medication refills.  Called patient to inform that needs to  prescriptions at the pharmacy.  He will bring in meds on Monday for me to fill after he stops at pharmacy.     Tara JASON  Ambulatory Case Management    3/28/2023, 13:10 EDT     CCM End of Month Documentation    This Chronic Medical Management Care Plan for Preston Wallis, 57 y.o. male, has been monitored and managed; reviewed and a new plan of care implemented for the month of March.  A cumulative time of 55  minutes was spent on this patient record this month, including phone call with care giver; electronic communication with primary care provider; electronic communication with pharmacist; chart review; phone call with pharmacist.    Regarding the patient's problems: has Polyneuropathy; Paroxysmal atrial fibrillation (HCC); Obstructive sleep apnea; MRSA pneumonia (HCC); Low back pain; Chronic diastolic heart failure (HCC); Allergies; COPD exacerbation (HCC); Chronic anticoagulation; Benign prostatic hyperplasia; Impaired mobility and endurance; Stage 3a chronic kidney disease (HCC); Iron deficiency anemia secondary to inadequate dietary iron intake; Vitamin D deficiency; Class 3 severe obesity with serious comorbidity in adult (HCC); Lower extremity edema; Elevated alkaline phosphatase level; Venous insufficiency (chronic) (peripheral); Tobacco abuse, in remission; History of Pseudomonas pneumonia; Chronic dyspnea; Gastroesophageal reflux disease; Bronchiectasis without complication (HCC); Altered mental status; Hyperlipidemia; Luetscher's syndrome; Neurogenic bladder; Class 1 obesity; Pneumonia due to Pseudomonas species (HCC); Seizures (HCC); Primary osteoarthritis of left knee; Other constipation; Chronic obstructive " pulmonary disease (HCC); 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  A detailed listing of time spent for chronic care management is tracked within each outreach encounter.  Current medications include:  has a current medication list which includes the following prescription(s): albuterol sulfate hfa, apixaban, atorvastatin, b-d uf iii mini pen needles, bumetanide, bydureon bcise, calcium citrate, vitamin d3, freestyle clau 2 reader, freestyle clau 2 sensor, mucus relief dm, diclofenac sodium, docusate sodium, duloxetine, famotidine, farxiga, ferrous gluconate, finasteride, finerenone, folic acid, gabapentin, glucose blood, heparin, hydralazine, insulin lispro, ipratropium-albuterol, isosorbide mononitrate, lantus solostar, losartan, melatonin-magnesium citrate, metoprolol tartrate, montelukast, nifedipine xl, o2, povidone-iodine, sodium chloride, tramadol, and trueplus lancets 28g. and the patient is reported to be caregiver will take responsibility for med compliance,  Medications are reported to be non-effective in controlling symptoms and changes have been made to the medication protocol.  Regarding these diagnoses, referrals were made to the following provider(s):  specialists.  All notes on chart for PCP to review.    The patient was monitored remotely for pain; medications.    The patient's physical needs include:  help taking medications as prescribed; medication education; needs assistance with ADLs; physical healthcare; resources for disability needs; physician referral.     The patient's  mental support needs include:  continued support    The patient's cognitive support needs include:  medication; continued support; needs assistance with ADLs    The patient's psychosocial support needs include:  continued support; coordination of community providers; medication management or adherence    The patient's functional needs include: health care coverage; medication education; needs assistance for ADLs; physical healthcare; physician referral; resources for disability needs    The patient's environmental needs include:  resources for disability needs    Care Plan overall comments:  Still not at goal, however improving. will continue to follow.    Refer to previous outreach notes for more information on the areas listed above.    Monthly Billing Diagnoses  (E11.65,  Z79.4) Uncontrolled type 2 diabetes mellitus with hyperglycemia, with long-term current use of insulin (HCC)    (N18.31) Stage 3a chronic kidney disease (HCC)    (G62.9) Peripheral polyneuropathy    (I50.32) Chronic diastolic (congestive) heart failure (HCC)    Medications   · Medications have been reconciled    Care Plan progress this month:      Recently Modified Care Plans Updates made since 2/25/2023 12:00 AM    No recently modified care plans.          · Current Specialty Plan of Care Status signed by both patient and provider    Instructions   · Patient was provided an electronic copy of care plan  · CCM services were explained and offered and patient has accepted these services.  · Patient has given their written consent to receive CCM services and understands that this includes the authorization of electronic communication of medical information with the other treating providers.  · Patient understands that they may stop CCM services at any time and these changes will be effective at the end of the calendar month and will effectively revocate the agreement of CCM services.  · Patient understands that only one practitioner can furnish and  be paid for CCM services during one calendar month.  Patient also understands that there may be co-payment or deductible fees in association with CCM services.  · Patient will continue with at least monthly follow-up calls with the Ambulatory .    Tara GOMEZ  Ambulatory Case Management    3/28/2023, 13:34 EDT

## 2023-03-30 ENCOUNTER — PATIENT OUTREACH (OUTPATIENT)
Dept: CASE MANAGEMENT | Facility: OTHER | Age: 58
End: 2023-03-30
Payer: COMMERCIAL

## 2023-03-30 DIAGNOSIS — E11.40 POORLY CONTROLLED TYPE 2 DIABETES MELLITUS WITH NEUROPATHY: ICD-10-CM

## 2023-03-30 DIAGNOSIS — Z79.4 TYPE 2 DIABETES MELLITUS WITH DIABETIC NEUROPATHY, WITH LONG-TERM CURRENT USE OF INSULIN: ICD-10-CM

## 2023-03-30 DIAGNOSIS — E11.65 POORLY CONTROLLED TYPE 2 DIABETES MELLITUS WITH NEUROPATHY: ICD-10-CM

## 2023-03-30 DIAGNOSIS — E11.40 TYPE 2 DIABETES MELLITUS WITH DIABETIC NEUROPATHY, WITH LONG-TERM CURRENT USE OF INSULIN: ICD-10-CM

## 2023-03-30 DIAGNOSIS — N18.31 STAGE 3A CHRONIC KIDNEY DISEASE: Primary | ICD-10-CM

## 2023-03-30 RX ORDER — INSULIN LISPRO 100 [IU]/ML
INJECTION, SOLUTION INTRAVENOUS; SUBCUTANEOUS
Qty: 15 ML | Refills: 1 | Status: SHIPPED | OUTPATIENT
Start: 2023-03-30

## 2023-03-30 NOTE — OUTREACH NOTE
"AMBULATORY CASE MANAGEMENT NOTE    Name and Relationship of Patient/Support Person: Preston Wallis \"Gómez\" - Self    Filled 3 more weeks of medications    Education Documentation  No documentation found.        Tara GOMEZ  Ambulatory Case Management    3/30/2023, 15:31 EDT  "

## 2023-03-31 ENCOUNTER — TELEPHONE (OUTPATIENT)
Dept: FAMILY MEDICINE CLINIC | Age: 58
End: 2023-03-31
Payer: COMMERCIAL

## 2023-03-31 NOTE — TELEPHONE ENCOUNTER
Approvedtoday  The request has been approved. The authorization is effective for a maximum of 12 fills from 03/31/2023 to 03/30/2024, as long as the member is enrolled in their current health plan. The request was approved as submitted. A written notification letter will follow with additional details.

## 2023-04-06 RX ORDER — SODIUM CHLORIDE FOR INHALATION 3 %
VIAL, NEBULIZER (ML) INHALATION
Qty: 240 ML | Refills: 5 | OUTPATIENT
Start: 2023-04-06

## 2023-04-07 PROBLEM — G89.29 CHRONIC PAIN OF LEFT KNEE: Status: ACTIVE | Noted: 2023-04-07

## 2023-04-07 PROBLEM — M17.11 PRIMARY OSTEOARTHRITIS OF RIGHT KNEE: Status: ACTIVE | Noted: 2023-04-07

## 2023-04-07 PROBLEM — S72.92XD: Status: ACTIVE | Noted: 2023-04-07

## 2023-04-07 PROBLEM — M25.562 CHRONIC PAIN OF LEFT KNEE: Status: ACTIVE | Noted: 2023-04-07

## 2023-04-13 ENCOUNTER — PATIENT OUTREACH (OUTPATIENT)
Dept: CASE MANAGEMENT | Facility: OTHER | Age: 58
End: 2023-04-13
Payer: COMMERCIAL

## 2023-04-13 DIAGNOSIS — N18.31 STAGE 3A CHRONIC KIDNEY DISEASE: Primary | ICD-10-CM

## 2023-04-14 NOTE — OUTREACH NOTE
AMBULATORY CASE MANAGEMENT NOTE    Name and Relationship of Patient/Support Person:  -     Elizabeth dropped off medications, preplanned for 4 weeks.  ACM will not be in office, will contact at beginning of month to get filled so not to run out.      Education Documentation  No documentation found.        Tara GOMEZ  Ambulatory Case Management     14:28 EDT

## 2023-04-15 DIAGNOSIS — G62.9 PERIPHERAL POLYNEUROPATHY: ICD-10-CM

## 2023-04-15 DIAGNOSIS — M54.41 CHRONIC BILATERAL LOW BACK PAIN WITH BILATERAL SCIATICA: ICD-10-CM

## 2023-04-15 DIAGNOSIS — G89.29 CHRONIC BILATERAL LOW BACK PAIN WITH BILATERAL SCIATICA: ICD-10-CM

## 2023-04-15 DIAGNOSIS — M54.42 CHRONIC BILATERAL LOW BACK PAIN WITH BILATERAL SCIATICA: ICD-10-CM

## 2023-04-18 RX ORDER — GABAPENTIN 300 MG/1
CAPSULE ORAL
Qty: 90 CAPSULE | Refills: 0 | Status: SHIPPED | OUTPATIENT
Start: 2023-04-18

## 2023-04-20 ENCOUNTER — PATIENT OUTREACH (OUTPATIENT)
Dept: CASE MANAGEMENT | Facility: OTHER | Age: 58
End: 2023-04-20
Payer: COMMERCIAL

## 2023-04-20 DIAGNOSIS — Z79.4 UNCONTROLLED TYPE 2 DIABETES MELLITUS WITH HYPERGLYCEMIA, WITH LONG-TERM CURRENT USE OF INSULIN: Primary | ICD-10-CM

## 2023-04-20 DIAGNOSIS — E11.65 UNCONTROLLED TYPE 2 DIABETES MELLITUS WITH HYPERGLYCEMIA, WITH LONG-TERM CURRENT USE OF INSULIN: Primary | ICD-10-CM

## 2023-04-20 NOTE — OUTREACH NOTE
"AMBULATORY CASE MANAGEMENT NOTE    Name and Relationship of Patient/Support Person: Preston Wallis \"Gómez\" - Self    Called Gómez to inquire why he is cancelling his appointments.  He did not really have a reason.  Will reschedule endo and cardio, offices closed today at 4:40pm.  Encouraged him to keep his follow up appointments.  Faxed nephrology for lab order to be done at office visit for Dr. Riley.     Education Documentation  No documentation found.        Taar GOMEZ  Ambulatory Case Management    4/20/2023, 16:38 EDT  "

## 2023-04-26 ENCOUNTER — PATIENT OUTREACH (OUTPATIENT)
Dept: CASE MANAGEMENT | Facility: OTHER | Age: 58
End: 2023-04-26
Payer: COMMERCIAL

## 2023-04-26 DIAGNOSIS — Z79.4 UNCONTROLLED TYPE 2 DIABETES MELLITUS WITH HYPERGLYCEMIA, WITH LONG-TERM CURRENT USE OF INSULIN: Primary | ICD-10-CM

## 2023-04-26 DIAGNOSIS — E11.65 UNCONTROLLED TYPE 2 DIABETES MELLITUS WITH HYPERGLYCEMIA, WITH LONG-TERM CURRENT USE OF INSULIN: Primary | ICD-10-CM

## 2023-04-26 NOTE — OUTREACH NOTE
AMBULATORY CASE MANAGEMENT NOTE    Name and Relationship of Patient/Support Person: Kami Wallis - Emergency Contact    Called diabetes specialty to reschedule appointment and there was an availability on Friday, rescheduled and Elizabeth notified.     Asked her to bring in meds to fill up.    Called nephrology for lab order to fax to [facility].     Tara GOMEZ  Ambulatory Case Management    4/26/2023, 10:41 EDT

## 2023-04-27 DIAGNOSIS — E11.65 POORLY CONTROLLED TYPE 2 DIABETES MELLITUS WITH NEUROPATHY: ICD-10-CM

## 2023-04-27 DIAGNOSIS — E11.40 POORLY CONTROLLED TYPE 2 DIABETES MELLITUS WITH NEUROPATHY: ICD-10-CM

## 2023-04-27 RX ORDER — INSULIN GLARGINE 100 [IU]/ML
INJECTION, SOLUTION SUBCUTANEOUS
Qty: 15 ML | Refills: 1 | OUTPATIENT
Start: 2023-04-27

## 2023-04-27 NOTE — PROGRESS NOTES
Chief Complaint  Diabetes (Follow up, med mgt, a1c eval, )    Referred By: No ref. provider found    Subjective          Preston Wallis presents to [facility] for diabetes medication management    History of Present Illness    Visit type:  follow-up  Diabetes type:  Type 2  Current diabetes status/concerns/issues: He indicates his morning glucose levels are doing good however his afternoon and evening glucose levels are running high.  He has had problems with his clau device falling off.  Other health concerns: no new health concerns  Current Diabetes symptoms:    Polyuria: Yes   Polydipsia: Yes   Polyphagia: Yes   Blurred vision: Yes   Excessive fatigue: Yes   Known Diabetes complications:  Neuropathy: Numbness, Tingling, Shooting Pain and Temperature variation (hot or cold sensations); Location: Feet  Renal: Stage IIIb moderate (GFR = 30-44 mL/min  Eyes: None; Location: N/A  Amputation/Wounds: Healed wound on the right foot  GI: None  Cardiovascular: Hypertension, Hyperlipidemia and CHF  ED: None  Other: None  Hypoglycemia:  None reported at this time  Hypoglycemia Symptoms:  No hypoglycemia at this time  Current diabetes treatment:  Byrureon 2 mg once weekly, Farxiga 5 mg once a day, Lantus 40 units once a day in the morning and Humalog using a sliding scale as follows:  IF BLOOD SUGAR < 160= 0 units, 161-220= 2 units, 221-280= 4 units, 281-340 = 6 units, 341-400 = 8 units  Blood glucose device:  Meter  Blood glucose monitoring frequency:  1  Blood glucose range/average:  280-490 in the evenings; mornings are 140-170     Glucose Source: Patient Reported  Diet:  Limits high carb/sweet foods, Avoids sugary drinks  Activity/Exercise:  None    Past Medical History:   Diagnosis Date   • Age-related cognitive decline    • Allergic contact dermatitis    • Allergies    • Anemia    • Bronchiectasis with acute lower respiratory infection    • Charcot foot due to diabetes mellitus 9/10/2013    • Chronic diastolic (congestive) heart failure    • Chronic kidney disease    • Chronic respiratory failure with hypoxia    • Closed supracondylar fracture of femur 1/12/2022   • COPD (chronic obstructive pulmonary disease)    • Deep vein thrombosis (DVT) of lower extremity associated with air travel 1/13/2023   • Dependence on supplemental oxygen    • Eczema    • Erectile dysfunction     due to organic reasons   • Essential (primary) hypertension    • Fracture     closed fracture of other tarsal and metatarsal bones   • Fracture of proximal humerus 1/13/2023   • GERD without esophagitis    • High risk medication use    • Hypercholesteremia    • Hypomagnesemia    • Infected stasis ulcer of left lower extremity 1/13/2023   • Insomnia    • Low back pain    • Major depressive disorder    • Morbid (severe) obesity due to excess calories    • MRSA pneumonia    • Muscle weakness    • 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 without evidence of necrosis    • Obstructive sleep apnea (adult) (pediatric)    • Other forms of dyspnea    • Other long term (current) drug therapy    • Other specified noninfective gastroenteritis and colitis    • Other spondylosis, lumbar region    • Pain in both knees    • Paroxysmal atrial fibrillation    • Peripheral neuropathy     attributed to type 2 diabetes   • Pneumonia, unspecified organism    • Polyneuropathy    • Rash and other nonspecific skin eruption    • Syncope and collapse    • Tachycardia    • Tinnitus 1/13/2023   • Type 1 diabetes mellitus with diabetic chronic kidney disease    • Type 2 diabetes mellitus    • Unspecified fall, initial encounter    • Urinary retention      Past Surgical History:   Procedure Laterality Date   • CHOLECYSTECTOMY     • CYSTOSCOPY     • FEMUR SURGERY Left     Shravan placed   • KNEE SURGERY Left    • OTHER SURGICAL HISTORY Left     venous port   • TONSILLECTOMY AND ADENOIDECTOMY       Family History   Problem  Relation Age of Onset   • Coronary artery disease Mother    • Hypertension Mother    • Diabetes type II Mother    • Asthma Father    • Diabetes type II Sister    • Cancer Sister      Social History     Socioeconomic History   • Marital status:    Tobacco Use   • Smoking status: Former     Packs/day: 1.00     Years: 12.00     Pack years: 12.00     Types: Cigarettes     Start date:      Quit date:      Years since quittin.3   • Smokeless tobacco: Never   Vaping Use   • Vaping Use: Never used   Substance and Sexual Activity   • Alcohol use: Not Currently   • Drug use: Never   • Sexual activity: Defer     Allergies   Allergen Reactions   • Benadryl [Diphenhydramine] Itching   • Proventil [Albuterol] Other (See Comments)     Mouth sores         Current Outpatient Medications:   •  albuterol sulfate  (90 Base) MCG/ACT inhaler, Inhale 2 puffs 4 (Four) Times a Day As Needed for Wheezing., Disp: 18 g, Rfl: 11  •  apixaban (Eliquis) 5 MG tablet tablet, Take 1 tablet by mouth Every 12 (Twelve) Hours., Disp: 180 tablet, Rfl: 2  •  atorvastatin (LIPITOR) 20 MG tablet, Take 1 tablet by mouth Every Night., Disp: 90 tablet, Rfl: 2  •  bumetanide (BUMEX) 2 MG tablet, Take 1 tablet BY MOUTH TWICE DAILY. call cardiology IF weight is greater THAN 2 pounds in 1 DAY OR 5 pounds in A WEEK., Disp: 180 tablet, Rfl: 1  •  calcium citrate (CALCITRATE) 950 (200 Ca) MG tablet, Take 1 tablet by mouth Daily., Disp: 90 tablet, Rfl: 3  •  Cholecalciferol (Vitamin D3) 50 MCG (2000 UT) tablet, Take 1 tablet BY MOUTH ONCE DAILY, Disp: 90 tablet, Rfl: 1  •  Continuous Blood Gluc  (FreeStyle Bethany 2 Mildred) device, , Disp: , Rfl:   •  Continuous Blood Gluc Sensor (FreeStyle Bethany 2 Sensor) misc, 1 each Every 14 (Fourteen) Days., Disp: 2 each, Rfl: 11  •  Dextromethorphan-guaiFENesin (Mucus Relief DM)  MG tablet sustained-release 12 hour, Take 1 tablet BY MOUTH TWICE DAILY AS NEEDED FOR congestion, Disp: 60  tablet, Rfl: 3  •  Diclofenac Sodium (VOLTAREN) 1 % gel gel, APPLY 4 GRAMS TO APPROPRIATE AREA FOUR TIMES DAILY AS NEEDED FOR ARTHRITIS FOR UP TO 30 DAYS, Disp: 100 g, Rfl: 2  •  docusate sodium (COLACE) 100 MG capsule, Take 1 capsule by mouth 2 (Two) Times a Day., Disp: 60 capsule, Rfl: 5  •  exenatide er (Bydureon BCise) 2 MG/0.85ML auto-injector injection, Inject 0.85 mL under the skin into the appropriate area as directed 1 (One) Time Per Week for 168 days., Disp: 3.4 mL, Rfl: 5  •  famotidine (PEPCID) 40 MG tablet, Take 1 tablet by mouth Every Morning., Disp: 90 tablet, Rfl: 2  •  Farxiga 5 MG tablet tablet, TAKE 1 TABLET BY MOUTH ONCE DAILY, Disp: 90 tablet, Rfl: 1  •  ferrous gluconate (FERGON) 324 MG tablet, Take 1 tablet BY MOUTH EVERY DAY with breakfast, Disp: 90 tablet, Rfl: 1  •  finasteride (PROSCAR) 5 MG tablet, Take 1 tablet BY MOUTH EVERY DAY, Disp: 90 tablet, Rfl: 1  •  Finerenone 10 MG tablet, Take 1 tablet by mouth., Disp: , Rfl:   •  folic acid (FOLVITE) 1 MG tablet, Take 1 tablet BY MOUTH EVERY DAY, Disp: 90 tablet, Rfl: 1  •  gabapentin (NEURONTIN) 300 MG capsule, TAKE ONE CAPSULE BY MOUTH EVERY MORNING AND TAKE TWO CAPSULES BY MOUTH EVERY NIGHT AT BEDTIME, Disp: 90 capsule, Rfl: 0  •  glucose blood test strip, 1 each by Other route Daily. Dx:   E11.40, Disp: 100 each, Rfl: 4  •  heparin 100 UNIT/ML solution injection, 5 mL by Intracatheter route Every 30 (Thirty) Days., Disp: 5 mL, Rfl: 12  •  hydrALAZINE (APRESOLINE) 50 MG tablet, Take 1 tablet BY MOUTH TWICE DAILY, Disp: 180 tablet, Rfl: 1  •  Insulin Lispro, 1 Unit Dial, (HUMALOG) 100 UNIT/ML solution pen-injector, inject EIGHT units UNDER THE SKIN INTO THE APPROPRIATE AREA THREE TIMES DAILY PER sliding scale, IF BLOOD SUGAR < 160= 0 units, 161-220= 2 units, 221-280= 4 units, 281-340 = SIX units, 341-400 = EIGHT units, Disp: 15 mL, Rfl: 1  •  Insulin Pen Needle (B-D UF III MINI PEN NEEDLES) 31G X 5 MM misc, 1 each by Other route 4 (Four)  "Times a Day Before Meals & at Bedtime for 90 days., Disp: 360 each, Rfl: 1  •  isosorbide mononitrate (IMDUR) 30 MG 24 hr tablet, Take 1 tablet by mouth Daily., Disp: 90 tablet, Rfl: 1  •  Lantus SoloStar 100 UNIT/ML injection pen, INJECT 40 UNITS UNDER THE SKIN ONCE DAILY, Disp: 15 mL, Rfl: 1  •  losartan (COZAAR) 25 MG tablet, Take 1 tablet by mouth Every Morning., Disp: , Rfl:   •  Melatonin-Magnesium Citrate 1-71.5 MG tablet, Take 1 tablet by mouth Every Night., Disp: 90 tablet, Rfl: 1  •  metoprolol tartrate (LOPRESSOR) 100 MG tablet, Take 1 tablet by mouth 2 (Two) Times a Day., Disp: 180 tablet, Rfl: 2  •  montelukast (SINGULAIR) 10 MG tablet, Take 1 tablet BY MOUTH EVERY night, Disp: 90 tablet, Rfl: 1  •  NIFEdipine XL (PROCARDIA XL) 30 MG 24 hr tablet, Take 1 tablet by mouth Every Morning., Disp: 90 tablet, Rfl: 2  •  O2 (OXYGEN), 2 Liter O2 - CONTINUOUS (route: Oxygen), Disp: , Rfl:   •  povidone-iodine (Betadine) 10 % external solution, Apply  topically to the appropriate area as directed As Needed for Wound Care., Disp: 118 mL, Rfl: 0  •  sodium chloride 0.9 % injection, Infuse 10 mL into a venous catheter Every 30 (Thirty) Days. Standing order.  Port flush q 30 days - 5mL Heparin, Disp: 10 mL, Rfl: 12  •  traMADol (ULTRAM) 50 MG tablet, Take 1 tablet by mouth Every 6 (Six) Hours As Needed for Moderate Pain., Disp: 40 tablet, Rfl: 0  •  TRUEplus Lancets 28G misc, USE AS DIRECTED, Disp: 100 each, Rfl: 0  •  DULoxetine (CYMBALTA) 60 MG capsule, Take 1 capsule by mouth 2 (Two) Times a Day., Disp: 180 capsule, Rfl: 1  •  ipratropium-albuterol (DUO-NEB) 0.5-2.5 mg/3 ml nebulizer, Take 3 mL by nebulization 4 (Four) Times a Day As Needed for Wheezing or Shortness of Air for up to 30 days., Disp: 120 mL, Rfl: 5    Objective     Vitals:    04/28/23 0826   BP: 115/51   BP Location: Right arm   Patient Position: Sitting   Cuff Size: Adult   Pulse: 74   SpO2: 91%   Height: 175.3 cm (69\")   PainSc:   8     Body mass " index is 42.83 kg/m².    Physical Exam  Constitutional:       Appearance: Normal appearance. He is obese.   HENT:      Head: Normocephalic and atraumatic.      Right Ear: External ear normal.      Left Ear: External ear normal.      Nose: Nose normal.   Eyes:      Extraocular Movements: Extraocular movements intact.      Conjunctiva/sclera: Conjunctivae normal.   Pulmonary:      Effort: Pulmonary effort is normal.   Musculoskeletal:      Cervical back: Normal range of motion.      Comments: He requires a wheelchair for mobilization   Skin:     General: Skin is warm and dry.   Neurological:      General: No focal deficit present.      Mental Status: He is alert and oriented to person, place, and time. Mental status is at baseline.   Psychiatric:         Mood and Affect: Mood normal.         Behavior: Behavior normal.         Thought Content: Thought content normal.         Judgment: Judgment normal.             Result Review :   The following data was reviewed by: CON Ashley on 04/28/2023:    Most Recent A1C        4/28/2023    08:28   HGBA1C Most Recent   Hemoglobin A1C 8.9         A1C Last 3 Results        10/5/2022    15:00 1/13/2023    13:28 4/28/2023    08:28   HGBA1C Last 3 Results   Hemoglobin A1C 9.10   7.8   8.9       Point-of-care A1c in the office today is 8.9% indicating uncontrolled type 2 diabetes.  This is up from the prior result of 7.8 collected in January of this year.    Creatinine   Date Value Ref Range Status   01/25/2023 1.96 (H) 0.76 - 1.27 mg/dL Final   11/03/2022 1.64 (H) 0.76 - 1.27 mg/dL Final     eGFR   Date Value Ref Range Status   01/25/2023 39.1 (L) >60.0 mL/min/1.73 Final   11/03/2022 48.5 (L) >60.0 mL/min/1.73 Final     Comment:     National Kidney Foundation and American Society of Nephrology (ASN) Task Force recommended calculation based on the Chronic Kidney Disease Epidemiology Collaboration (CKD-EPI) equation refit without adjustment for race.     Labs collected on  1/25/2023 show stage IIIb renal disease          Assessment: The patient has had an increase in his A1c.  In discussion with the patient he is only using his Humalog insulin as needed for high glucose levels.  He just randomly checks his glucose level and will use a sliding scale.  He has had difficulty keeping his clau device is in place.  He was given some samples of some overlays and will try using these.      Diagnoses and all orders for this visit:    1. Uncontrolled type 2 diabetes mellitus with hyperglycemia (Primary)  -     POC Glycosylated Hemoglobin (Hb A1C)    2. Type 1 diabetes mellitus with stage 3a chronic kidney disease  Comments:  BS running low, decrease basaglar to 35 units valerio  Orders:  -     Insulin Pen Needle (B-D UF III MINI PEN NEEDLES) 31G X 5 MM misc; 1 each by Other route 4 (Four) Times a Day Before Meals & at Bedtime for 90 days.  Dispense: 360 each; Refill: 1    3. Type 2 diabetes mellitus with hyperglycemia, with long-term current use of insulin  -     exenatide er (Bydureon BCise) 2 MG/0.85ML auto-injector injection; Inject 0.85 mL under the skin into the appropriate area as directed 1 (One) Time Per Week for 168 days.  Dispense: 3.4 mL; Refill: 5    4. Type 2 diabetes mellitus with diabetic neuropathy, with long-term current use of insulin    5. Severe obesity (BMI >= 40)        Plan: We will increase the Lantus to 45 units each day.  The patient was instructed to begin taking Humalog 5 to 10 units before each meal based on carbohydrate content of the meal and then add the previously prescribed sliding scale to this meal dose.  Written copy of these instructions were reviewed with the patient and provided in the after visit summary.  He is to take this insulin with each meal.  The patient verbalizes understanding of these new instructions.    The patient will monitor his blood glucose levels using his continuous glucose sensor.  If he develops problematic hyperglycemia or  hypoglycemia or adverse drug reactions, he will contact the office for further instructions.        Follow Up     No follow-ups on file.    Patient was given instructions and counseling regarding his condition or for health maintenance advice. Please see specific information pulled into the AVS if appropriate.     Neli Paredes, APRN  04/28/2023      Dictated Utilizing Dragon Dictation.  Please note that portions of this note were completed with a voice recognition program.  Part of this note may be an electronic transcription/translation of spoken language to printed text using the Dragon Dictation System.

## 2023-04-28 ENCOUNTER — PATIENT OUTREACH (OUTPATIENT)
Dept: CASE MANAGEMENT | Facility: OTHER | Age: 58
End: 2023-04-28
Payer: COMMERCIAL

## 2023-04-28 ENCOUNTER — TRANSCRIBE ORDERS (OUTPATIENT)
Dept: ADMINISTRATIVE | Facility: HOSPITAL | Age: 58
End: 2023-04-28
Payer: COMMERCIAL

## 2023-04-28 ENCOUNTER — OFFICE VISIT (OUTPATIENT)
Dept: DIABETES SERVICES | Facility: CLINIC | Age: 58
End: 2023-04-28
Payer: COMMERCIAL

## 2023-04-28 ENCOUNTER — TELEPHONE (OUTPATIENT)
Dept: DIABETES SERVICES | Facility: HOSPITAL | Age: 58
End: 2023-04-28
Payer: COMMERCIAL

## 2023-04-28 VITALS
OXYGEN SATURATION: 91 % | SYSTOLIC BLOOD PRESSURE: 115 MMHG | HEART RATE: 74 BPM | HEIGHT: 69 IN | BODY MASS INDEX: 42.83 KG/M2 | DIASTOLIC BLOOD PRESSURE: 51 MMHG

## 2023-04-28 DIAGNOSIS — N18.31 CHRONIC KIDNEY DISEASE (CKD) STAGE G3A/A1, MODERATELY DECREASED GLOMERULAR FILTRATION RATE (GFR) BETWEEN 45-59 ML/MIN/1.73 SQUARE METER AND ALBUMINURIA CREATININE RATIO LESS THAN 30 MG/G (CMS/H*: Primary | ICD-10-CM

## 2023-04-28 DIAGNOSIS — E11.65 TYPE 2 DIABETES MELLITUS WITH HYPERGLYCEMIA, WITH LONG-TERM CURRENT USE OF INSULIN: ICD-10-CM

## 2023-04-28 DIAGNOSIS — N18.31 TYPE 1 DIABETES MELLITUS WITH STAGE 3A CHRONIC KIDNEY DISEASE: ICD-10-CM

## 2023-04-28 DIAGNOSIS — Z79.4 UNCONTROLLED TYPE 2 DIABETES MELLITUS WITH HYPERGLYCEMIA, WITH LONG-TERM CURRENT USE OF INSULIN: Primary | ICD-10-CM

## 2023-04-28 DIAGNOSIS — E10.22 TYPE 1 DIABETES MELLITUS WITH STAGE 3A CHRONIC KIDNEY DISEASE: ICD-10-CM

## 2023-04-28 DIAGNOSIS — I50.32 CHRONIC DIASTOLIC (CONGESTIVE) HEART FAILURE: ICD-10-CM

## 2023-04-28 DIAGNOSIS — Z79.4 TYPE 2 DIABETES MELLITUS WITH HYPERGLYCEMIA, WITH LONG-TERM CURRENT USE OF INSULIN: ICD-10-CM

## 2023-04-28 DIAGNOSIS — D50.8 IRON DEFICIENCY ANEMIA SECONDARY TO INADEQUATE DIETARY IRON INTAKE: ICD-10-CM

## 2023-04-28 DIAGNOSIS — I48.0 PAROXYSMAL ATRIAL FIBRILLATION: ICD-10-CM

## 2023-04-28 DIAGNOSIS — F32.4 MAJOR DEPRESSIVE DISORDER WITH SINGLE EPISODE, IN PARTIAL REMISSION: ICD-10-CM

## 2023-04-28 DIAGNOSIS — Z79.4 TYPE 2 DIABETES MELLITUS WITH DIABETIC NEUROPATHY, WITH LONG-TERM CURRENT USE OF INSULIN: ICD-10-CM

## 2023-04-28 DIAGNOSIS — E11.65 UNCONTROLLED TYPE 2 DIABETES MELLITUS WITH HYPERGLYCEMIA, WITH LONG-TERM CURRENT USE OF INSULIN: Primary | ICD-10-CM

## 2023-04-28 DIAGNOSIS — E55.9 VITAMIN D DEFICIENCY: ICD-10-CM

## 2023-04-28 DIAGNOSIS — E66.01 SEVERE OBESITY (BMI >= 40): ICD-10-CM

## 2023-04-28 DIAGNOSIS — E11.40 TYPE 2 DIABETES MELLITUS WITH DIABETIC NEUROPATHY, WITH LONG-TERM CURRENT USE OF INSULIN: ICD-10-CM

## 2023-04-28 DIAGNOSIS — E11.65 UNCONTROLLED TYPE 2 DIABETES MELLITUS WITH HYPERGLYCEMIA: Primary | ICD-10-CM

## 2023-04-28 DIAGNOSIS — N18.31 STAGE 3A CHRONIC KIDNEY DISEASE: ICD-10-CM

## 2023-04-28 DIAGNOSIS — G62.9 PERIPHERAL POLYNEUROPATHY: ICD-10-CM

## 2023-04-28 LAB
EXPIRATION DATE: ABNORMAL
HBA1C MFR BLD: 8.9 %
Lab: ABNORMAL

## 2023-04-28 RX ORDER — EXENATIDE 2 MG/.85ML
2 INJECTION, SUSPENSION, EXTENDED RELEASE SUBCUTANEOUS WEEKLY
Qty: 3.4 ML | Refills: 5 | Status: SHIPPED | OUTPATIENT
Start: 2023-04-28 | End: 2023-10-13

## 2023-04-28 RX ORDER — FLURBIPROFEN SODIUM 0.3 MG/ML
1 SOLUTION/ DROPS OPHTHALMIC
Qty: 360 EACH | Refills: 1 | Status: SHIPPED | OUTPATIENT
Start: 2023-04-28 | End: 2023-07-27

## 2023-04-28 RX ORDER — DULOXETIN HYDROCHLORIDE 60 MG/1
60 CAPSULE, DELAYED RELEASE ORAL 2 TIMES DAILY
Qty: 180 CAPSULE | Refills: 1 | Status: SHIPPED | OUTPATIENT
Start: 2023-04-28

## 2023-04-28 NOTE — TELEPHONE ENCOUNTER
RICH URIARTE Key: [id number] - PA Case ID: [id number] - Rx #: [id number]  BD Pen Needle Mini U/F 31G X 5 MM

## 2023-04-28 NOTE — PATIENT INSTRUCTIONS
Increase your Lantus to 45 units once a day in the evening    Begin taking Humalog 5 units before each meal.  Use only 5 units for a typical meal that is lower in carbohydrates and 10 units if the meal has quite a bit of carbohydrates such as beans and corn bread    Add to the 5 units or 10 units the sliding scale as follows:    IF BLOOD SUGAR < 160= 0 units  161-220= 2 units  221-280= 4 units  281-340 = 6 units  341-400 = 8 units

## 2023-04-28 NOTE — OUTREACH NOTE
AMBULATORY CASE MANAGEMENT NOTE    Name and Relationship of Patient/Support Person:  -     Gómez came in for medication prepackaging.  2 weeks planned.  Saw diabetes specialists, A1c not at goal.   Will continue to follow.      Tara GOMEZ  Ambulatory Case Management    4/28/2023, 10:10 EDT   [facility] End of Month Documentation    This Chronic Medical Management Care Plan for Preston Wallis, 57 y.o. male, has been monitored and managed; reviewed and a new plan of care implemented for the month of April.  A cumulative time of 58  minutes was spent on this patient record this month, including phone call with care giver; electronic communication with primary care provider; electronic communication with pharmacist; chart review; phone call with pharmacist.    Regarding the patient's problems: has Polyneuropathy; Paroxysmal atrial fibrillation; Obstructive sleep apnea; MRSA pneumonia; Low back pain; Chronic diastolic heart failure; Allergies; COPD exacerbation; Chronic anticoagulation; Benign prostatic hyperplasia; Impaired mobility and endurance; Stage 3a chronic kidney disease; Iron deficiency anemia secondary to inadequate dietary iron intake; Vitamin D deficiency; Class 3 severe obesity with serious comorbidity in adult; Lower extremity edema; Elevated alkaline phosphatase level; Venous insufficiency (chronic) (peripheral); Tobacco abuse, in remission; History of Pseudomonas pneumonia; Chronic dyspnea; Gastroesophageal reflux disease; Bronchiectasis without complication; Altered mental status; Hyperlipidemia; Luetscher's syndrome; Neurogenic bladder; Class 1 obesity; Pneumonia due to Pseudomonas species; Seizures; Primary osteoarthritis of left knee; Other constipation; Chronic obstructive pulmonary disease; Type 2 DM with CKD stage 3 and hypertension; Essential (primary) hypertension; Stage 3b chronic kidney disease (CKD); Annual physical exam; Long-term use of high-risk medication; Personal history of PE (pulmonary  embolism); Encounter for aftercare for healing closed traumatic fracture of left femur; Chronic pain of left knee; and Primary osteoarthritis of right knee on their problem list., the following items were addressed: medications; transitions to medical care; medical records; referrals to community service providers and any changes can be found within the plan section of the note.  A detailed listing of time spent for chronic care management is tracked within each outreach encounter.  Current medications include:  has a current medication list which includes the following prescription(s): albuterol sulfate hfa, apixaban, atorvastatin, bumetanide, calcium citrate, vitamin d3, freestyle clau 2 reader, freestyle clau 2 sensor, mucus relief dm, diclofenac sodium, docusate sodium, duloxetine, bydureon bcise, famotidine, farxiga, ferrous gluconate, finasteride, finerenone, folic acid, gabapentin, glucose blood, heparin, hydralazine, insulin lispro (1 unit dial), b-d uf iii mini pen needles, ipratropium-albuterol, isosorbide mononitrate, lantus solostar, losartan, melatonin-magnesium citrate, metoprolol tartrate, montelukast, nifedipine xl, o2, povidone-iodine, sodium chloride, tramadol, and trueplus lancets 28g. and the patient is reported to be caregiver will take responsibility for med compliance,  Medications are reported to be non-effective in controlling symptoms and changes have been made to the medication protocol.  Regarding these diagnoses, referrals were made to the following provider(s):  specialty.  All notes on chart for PCP to review.    The patient was monitored remotely for pain; medications.    The patient's physical needs include:  help taking medications as prescribed; medication education; needs assistance with ADLs; physical healthcare; resources for disability needs; physician referral.     The patient's mental support needs include:  continued support    The patient's cognitive support needs  include:  medication; continued support; needs assistance with ADLs    The patient's psychosocial support needs include:  continued support; coordination of community providers; medication management or adherence    The patient's functional needs include: health care coverage; medication education; needs assistance for ADLs; physical healthcare; physician referral; resources for disability needs    The patient's environmental needs include:  resources for disability needs    Care Plan overall comments:  Still not at goal, however improving. will continue to follow.    Refer to previous outreach notes for more information on the areas listed above.    Monthly Billing Diagnoses  (E11.65,  Z79.4) Uncontrolled type 2 diabetes mellitus with hyperglycemia, with long-term current use of insulin    (N18.31) Stage 3a chronic kidney disease    (G62.9) Peripheral polyneuropathy    (I50.32) Chronic diastolic (congestive) heart failure    (I48.0) Paroxysmal atrial fibrillation    Medications   · Medications have been reconciled    Care Plan progress this month:      Recently Modified Care Plans Updates made since 3/28/2023 12:00 AM    No recently modified care plans.          · Current Specialty Plan of Care Status signed by both patient and provider    Instructions   · Patient was provided an electronic copy of care plan  · CCM services were explained and offered and patient has accepted these services.  · Patient has given their written consent to receive CCM services and understands that this includes the authorization of electronic communication of medical information with the other treating providers.  · Patient understands that they may stop CCM services at any time and these changes will be effective at the end of the calendar month and will effectively revocate the agreement of CCM services.  · Patient understands that only one practitioner can furnish and be paid for CCM services during one calendar month.  Patient also  understands that there may be co-payment or deductible fees in association with CCM services.  · Patient will continue with at least monthly follow-up calls with the Ambulatory .    Tara GOMEZ  Ambulatory Case Management    4/28/2023, 10:10 EDT

## 2023-05-05 DIAGNOSIS — E11.65 POORLY CONTROLLED TYPE 2 DIABETES MELLITUS WITH NEUROPATHY: ICD-10-CM

## 2023-05-05 DIAGNOSIS — E11.40 POORLY CONTROLLED TYPE 2 DIABETES MELLITUS WITH NEUROPATHY: ICD-10-CM

## 2023-05-05 RX ORDER — INSULIN GLARGINE 100 [IU]/ML
INJECTION, SOLUTION SUBCUTANEOUS
Qty: 15 ML | Refills: 1 | Status: SHIPPED | OUTPATIENT
Start: 2023-05-05

## 2023-05-08 ENCOUNTER — LAB (OUTPATIENT)
Dept: LAB | Facility: HOSPITAL | Age: 58
End: 2023-05-08
Payer: COMMERCIAL

## 2023-05-08 ENCOUNTER — LAB REQUISITION (OUTPATIENT)
Dept: LAB | Facility: HOSPITAL | Age: 58
End: 2023-05-08
Payer: COMMERCIAL

## 2023-05-08 DIAGNOSIS — N18.30 CHRONIC KIDNEY DISEASE, STAGE 3 UNSPECIFIED: ICD-10-CM

## 2023-05-08 DIAGNOSIS — N18.31 CHRONIC KIDNEY DISEASE (CKD) STAGE G3A/A1, MODERATELY DECREASED GLOMERULAR FILTRATION RATE (GFR) BETWEEN 45-59 ML/MIN/1.73 SQUARE METER AND ALBUMINURIA CREATININE RATIO LESS THAN 30 MG/G (CMS/H*: ICD-10-CM

## 2023-05-08 DIAGNOSIS — D50.8 IRON DEFICIENCY ANEMIA SECONDARY TO INADEQUATE DIETARY IRON INTAKE: ICD-10-CM

## 2023-05-08 DIAGNOSIS — E55.9 VITAMIN D DEFICIENCY: ICD-10-CM

## 2023-05-08 DIAGNOSIS — J44.9 CHRONIC OBSTRUCTIVE PULMONARY DISEASE, UNSPECIFIED: ICD-10-CM

## 2023-05-08 DIAGNOSIS — I50.9 HEART FAILURE, UNSPECIFIED: ICD-10-CM

## 2023-05-08 DIAGNOSIS — I13.0 HYPERTENSIVE HEART AND CHRONIC KIDNEY DISEASE WITH HEART FAILURE AND STAGE 1 THROUGH STAGE 4 CHRONIC KIDNEY DISEASE, OR UNSPECIFIED CHRONIC KIDNEY DISEASE: ICD-10-CM

## 2023-05-08 DIAGNOSIS — D63.1 ANEMIA IN CHRONIC KIDNEY DISEASE (CODE): ICD-10-CM

## 2023-05-08 LAB
ALBUMIN SERPL-MCNC: 3.5 G/DL (ref 3.5–5.2)
ANION GAP SERPL CALCULATED.3IONS-SCNC: 8.3 MMOL/L (ref 5–15)
BACTERIA UR QL AUTO: ABNORMAL /HPF
BASOPHILS # BLD AUTO: 0.02 10*3/MM3 (ref 0–0.2)
BASOPHILS NFR BLD AUTO: 0.2 % (ref 0–1.5)
BILIRUB UR QL STRIP: NEGATIVE
BUN SERPL-MCNC: 29 MG/DL (ref 6–20)
BUN/CREAT SERPL: 16.8 (ref 7–25)
CALCIUM SPEC-SCNC: 9.3 MG/DL (ref 8.6–10.5)
CHLORIDE SERPL-SCNC: 97 MMOL/L (ref 98–107)
CLARITY UR: CLEAR
CO2 SERPL-SCNC: 32.7 MMOL/L (ref 22–29)
COLOR UR: YELLOW
CREAT SERPL-MCNC: 1.73 MG/DL (ref 0.76–1.27)
CREAT UR-MCNC: 30.8 MG/DL
DEPRECATED RDW RBC AUTO: 43.6 FL (ref 37–54)
EGFRCR SERPLBLD CKD-EPI 2021: 45.5 ML/MIN/1.73
EOSINOPHIL # BLD AUTO: 0.29 10*3/MM3 (ref 0–0.4)
EOSINOPHIL NFR BLD AUTO: 2.6 % (ref 0.3–6.2)
ERYTHROCYTE [DISTWIDTH] IN BLOOD BY AUTOMATED COUNT: 13.2 % (ref 12.3–15.4)
FERRITIN SERPL-MCNC: 55.77 NG/ML (ref 30–400)
GLUCOSE SERPL-MCNC: 167 MG/DL (ref 65–99)
GLUCOSE UR STRIP-MCNC: ABNORMAL MG/DL
HCT VFR BLD AUTO: 33.1 % (ref 37.5–51)
HGB BLD-MCNC: 10.2 G/DL (ref 13–17.7)
HGB UR QL STRIP.AUTO: ABNORMAL
IMM GRANULOCYTES # BLD AUTO: 0.03 10*3/MM3 (ref 0–0.05)
IMM GRANULOCYTES NFR BLD AUTO: 0.3 % (ref 0–0.5)
IRON 24H UR-MRATE: 60 MCG/DL (ref 59–158)
IRON SATN MFR SERPL: 20 % (ref 20–50)
KETONES UR QL STRIP: NEGATIVE
LEUKOCYTE ESTERASE UR QL STRIP.AUTO: NEGATIVE
LYMPHOCYTES # BLD AUTO: 2.52 10*3/MM3 (ref 0.7–3.1)
LYMPHOCYTES NFR BLD AUTO: 22.8 % (ref 19.6–45.3)
MCH RBC QN AUTO: 27.9 PG (ref 26.6–33)
MCHC RBC AUTO-ENTMCNC: 30.8 G/DL (ref 31.5–35.7)
MCV RBC AUTO: 90.7 FL (ref 79–97)
MONOCYTES # BLD AUTO: 0.94 10*3/MM3 (ref 0.1–0.9)
MONOCYTES NFR BLD AUTO: 8.5 % (ref 5–12)
NEUTROPHILS NFR BLD AUTO: 65.6 % (ref 42.7–76)
NEUTROPHILS NFR BLD AUTO: 7.24 10*3/MM3 (ref 1.7–7)
NITRITE UR QL STRIP: NEGATIVE
PH UR STRIP.AUTO: 7 [PH] (ref 5–8)
PHOSPHATE SERPL-MCNC: 4.4 MG/DL (ref 2.5–4.5)
PLATELET # BLD AUTO: 426 10*3/MM3 (ref 140–450)
PMV BLD AUTO: 8.8 FL (ref 6–12)
POTASSIUM SERPL-SCNC: 4.5 MMOL/L (ref 3.5–5.2)
PROT ?TM UR-MCNC: 145.1 MG/DL
PROT UR QL STRIP: ABNORMAL
PROT/CREAT UR: 4.71 MG/G{CREAT}
RBC # BLD AUTO: 3.65 10*6/MM3 (ref 4.14–5.8)
RBC # UR STRIP: ABNORMAL /HPF
REF LAB TEST METHOD: ABNORMAL
SODIUM SERPL-SCNC: 138 MMOL/L (ref 136–145)
SP GR UR STRIP: 1.02 (ref 1–1.03)
SQUAMOUS #/AREA URNS HPF: ABNORMAL /HPF
TIBC SERPL-MCNC: 305 MCG/DL (ref 298–536)
TRANSFERRIN SERPL-MCNC: 205 MG/DL (ref 200–360)
UROBILINOGEN UR QL STRIP: ABNORMAL
WBC # UR STRIP: ABNORMAL /HPF
WBC NRBC COR # BLD: 11.04 10*3/MM3 (ref 3.4–10.8)

## 2023-05-08 PROCEDURE — 82746 ASSAY OF FOLIC ACID SERUM: CPT | Performed by: NURSE PRACTITIONER

## 2023-05-08 PROCEDURE — 82306 VITAMIN D 25 HYDROXY: CPT | Performed by: NURSE PRACTITIONER

## 2023-05-08 PROCEDURE — 83540 ASSAY OF IRON: CPT | Performed by: NURSE PRACTITIONER

## 2023-05-08 PROCEDURE — 83970 ASSAY OF PARATHORMONE: CPT | Performed by: NURSE PRACTITIONER

## 2023-05-08 PROCEDURE — 81001 URINALYSIS AUTO W/SCOPE: CPT

## 2023-05-08 PROCEDURE — 85025 COMPLETE CBC W/AUTO DIFF WBC: CPT | Performed by: NURSE PRACTITIONER

## 2023-05-08 PROCEDURE — 82728 ASSAY OF FERRITIN: CPT | Performed by: NURSE PRACTITIONER

## 2023-05-08 PROCEDURE — 84156 ASSAY OF PROTEIN URINE: CPT

## 2023-05-08 PROCEDURE — 82607 VITAMIN B-12: CPT | Performed by: NURSE PRACTITIONER

## 2023-05-08 PROCEDURE — 80069 RENAL FUNCTION PANEL: CPT | Performed by: NURSE PRACTITIONER

## 2023-05-08 PROCEDURE — 82570 ASSAY OF URINE CREATININE: CPT

## 2023-05-08 PROCEDURE — 84466 ASSAY OF TRANSFERRIN: CPT | Performed by: NURSE PRACTITIONER

## 2023-05-09 DIAGNOSIS — E21.3 HYPERPARATHYROIDISM: Primary | ICD-10-CM

## 2023-05-09 LAB
25(OH)D3 SERPL-MCNC: 31.4 NG/ML (ref 30–100)
FOLATE SERPL-MCNC: >20 NG/ML (ref 4.78–24.2)
PTH-INTACT SERPL-MCNC: 280 PG/ML (ref 15–65)
VIT B12 BLD-MCNC: 491 PG/ML (ref 211–946)

## 2023-05-15 DIAGNOSIS — T78.40XS ALLERGY, SEQUELA: ICD-10-CM

## 2023-05-15 DIAGNOSIS — M17.12 PRIMARY OSTEOARTHRITIS OF LEFT KNEE: ICD-10-CM

## 2023-05-15 DIAGNOSIS — I10 ESSENTIAL (PRIMARY) HYPERTENSION: ICD-10-CM

## 2023-05-15 RX ORDER — HYDRALAZINE HYDROCHLORIDE 50 MG/1
TABLET, FILM COATED ORAL
Qty: 180 TABLET | Refills: 1 | Status: SHIPPED | OUTPATIENT
Start: 2023-05-15

## 2023-05-15 RX ORDER — SODIUM CHLORIDE FOR INHALATION 3 %
VIAL, NEBULIZER (ML) INHALATION
Qty: 240 ML | Refills: 5 | Status: SHIPPED | OUTPATIENT
Start: 2023-05-15

## 2023-05-15 RX ORDER — MONTELUKAST SODIUM 10 MG/1
TABLET ORAL
Qty: 90 TABLET | Refills: 1 | Status: SHIPPED | OUTPATIENT
Start: 2023-05-15

## 2023-05-16 DIAGNOSIS — G89.29 CHRONIC BILATERAL LOW BACK PAIN WITH BILATERAL SCIATICA: ICD-10-CM

## 2023-05-16 DIAGNOSIS — M54.42 CHRONIC BILATERAL LOW BACK PAIN WITH BILATERAL SCIATICA: ICD-10-CM

## 2023-05-16 DIAGNOSIS — G62.9 PERIPHERAL POLYNEUROPATHY: ICD-10-CM

## 2023-05-16 DIAGNOSIS — M54.41 CHRONIC BILATERAL LOW BACK PAIN WITH BILATERAL SCIATICA: ICD-10-CM

## 2023-05-16 RX ORDER — GABAPENTIN 300 MG/1
CAPSULE ORAL
Qty: 90 CAPSULE | Refills: 0 | Status: SHIPPED | OUTPATIENT
Start: 2023-05-16

## 2023-05-16 NOTE — TELEPHONE ENCOUNTER
Rx Refill Note  Requested Prescriptions     Pending Prescriptions Disp Refills   • gabapentin (NEURONTIN) 300 MG capsule [Pharmacy Med Name: gabapentin 300 mg capsule] 90 capsule 0     Sig: TAKE ONE CAPSULE BY MOUTH EVERY MORNING AND TAKE TWO CAPSULES BY MOUTH EVERY NIGHT AT BEDTIME      Last office visit with prescribing clinician: 2/7/23    Next office visit with prescribing clinician: 5/18/23    Last filled 4/19/23  #90 no refills     Last UDS 2/7/23  POC Urine Drug Screen [facility] (02/07/2023 10:38)    Contract 6/30/22    Dali Reynolds LPN  05/16/23, 08:16 EDT

## 2023-05-18 ENCOUNTER — HOSPITAL ENCOUNTER (OUTPATIENT)
Dept: GENERAL RADIOLOGY | Facility: HOSPITAL | Age: 58
Discharge: HOME | End: 2023-05-18
Payer: COMMERCIAL

## 2023-05-18 ENCOUNTER — PATIENT OUTREACH (OUTPATIENT)
Dept: CASE MANAGEMENT | Facility: OTHER | Age: 58
End: 2023-05-18
Payer: COMMERCIAL

## 2023-05-18 ENCOUNTER — OFFICE VISIT (OUTPATIENT)
Dept: FAMILY MEDICINE CLINIC | Age: 58
End: 2023-05-18
Payer: COMMERCIAL

## 2023-05-18 VITALS
HEIGHT: 69 IN | SYSTOLIC BLOOD PRESSURE: 110 MMHG | DIASTOLIC BLOOD PRESSURE: 43 MMHG | OXYGEN SATURATION: 94 % | HEART RATE: 75 BPM | BODY MASS INDEX: 42.81 KG/M2

## 2023-05-18 DIAGNOSIS — S42.91XA CLOSED FRACTURE OF RIGHT SHOULDER, INITIAL ENCOUNTER: ICD-10-CM

## 2023-05-18 DIAGNOSIS — M54.41 CHRONIC BILATERAL LOW BACK PAIN WITH BILATERAL SCIATICA: ICD-10-CM

## 2023-05-18 DIAGNOSIS — I10 ESSENTIAL (PRIMARY) HYPERTENSION: ICD-10-CM

## 2023-05-18 DIAGNOSIS — I50.32 CHRONIC DIASTOLIC (CONGESTIVE) HEART FAILURE: ICD-10-CM

## 2023-05-18 DIAGNOSIS — J40 BRONCHITIS: Primary | ICD-10-CM

## 2023-05-18 DIAGNOSIS — G89.29 CHRONIC RIGHT SHOULDER PAIN: ICD-10-CM

## 2023-05-18 DIAGNOSIS — E11.65 POORLY CONTROLLED TYPE 2 DIABETES MELLITUS WITH NEUROPATHY: ICD-10-CM

## 2023-05-18 DIAGNOSIS — N18.32 STAGE 3B CHRONIC KIDNEY DISEASE (CKD): ICD-10-CM

## 2023-05-18 DIAGNOSIS — N25.81 HYPERPARATHYROIDISM DUE TO RENAL INSUFFICIENCY: Primary | ICD-10-CM

## 2023-05-18 DIAGNOSIS — Z79.4 TYPE 2 DIABETES MELLITUS WITH DIABETIC NEUROPATHY, WITH LONG-TERM CURRENT USE OF INSULIN: ICD-10-CM

## 2023-05-18 DIAGNOSIS — R91.8 MASS OF UPPER LOBE OF RIGHT LUNG: ICD-10-CM

## 2023-05-18 DIAGNOSIS — M25.511 CHRONIC RIGHT SHOULDER PAIN: ICD-10-CM

## 2023-05-18 DIAGNOSIS — E11.40 TYPE 2 DIABETES MELLITUS WITH DIABETIC NEUROPATHY, WITH LONG-TERM CURRENT USE OF INSULIN: ICD-10-CM

## 2023-05-18 DIAGNOSIS — Z79.4 UNCONTROLLED TYPE 2 DIABETES MELLITUS WITH HYPERGLYCEMIA, WITH LONG-TERM CURRENT USE OF INSULIN: Primary | ICD-10-CM

## 2023-05-18 DIAGNOSIS — Z79.899 LONG-TERM USE OF HIGH-RISK MEDICATION: ICD-10-CM

## 2023-05-18 DIAGNOSIS — G89.29 CHRONIC BILATERAL LOW BACK PAIN WITH BILATERAL SCIATICA: ICD-10-CM

## 2023-05-18 DIAGNOSIS — F51.01 PRIMARY INSOMNIA: ICD-10-CM

## 2023-05-18 DIAGNOSIS — M54.42 CHRONIC BILATERAL LOW BACK PAIN WITH BILATERAL SCIATICA: ICD-10-CM

## 2023-05-18 DIAGNOSIS — E11.65 UNCONTROLLED TYPE 2 DIABETES MELLITUS WITH HYPERGLYCEMIA, WITH LONG-TERM CURRENT USE OF INSULIN: Primary | ICD-10-CM

## 2023-05-18 DIAGNOSIS — E21.3 HYPERPARATHYROIDISM: ICD-10-CM

## 2023-05-18 DIAGNOSIS — E11.40 POORLY CONTROLLED TYPE 2 DIABETES MELLITUS WITH NEUROPATHY: ICD-10-CM

## 2023-05-18 DIAGNOSIS — R82.90 CLOUDY URINE: ICD-10-CM

## 2023-05-18 DIAGNOSIS — M19.011 PRIMARY OSTEOARTHRITIS OF RIGHT SHOULDER: ICD-10-CM

## 2023-05-18 DIAGNOSIS — J40 BRONCHITIS: ICD-10-CM

## 2023-05-18 DIAGNOSIS — G62.9 PERIPHERAL POLYNEUROPATHY: ICD-10-CM

## 2023-05-18 DIAGNOSIS — D50.8 IRON DEFICIENCY ANEMIA SECONDARY TO INADEQUATE DIETARY IRON INTAKE: ICD-10-CM

## 2023-05-18 LAB
AMPHET+METHAMPHET UR QL: NEGATIVE
AMPHETAMINES UR QL: NEGATIVE
BARBITURATES UR QL SCN: NEGATIVE
BENZODIAZ UR QL SCN: NEGATIVE
BUPRENORPHINE SERPL-MCNC: POSITIVE NG/ML
CANNABINOIDS SERPL QL: NEGATIVE
COCAINE UR QL: NEGATIVE
EXPIRATION DATE: ABNORMAL
EXPIRATION DATE: NORMAL
FLUAV AG UPPER RESP QL IA.RAPID: NOT DETECTED
FLUBV AG UPPER RESP QL IA.RAPID: NOT DETECTED
INTERNAL CONTROL: NORMAL
Lab: ABNORMAL
Lab: NORMAL
MDMA UR QL SCN: NEGATIVE
METHADONE UR QL SCN: NEGATIVE
OPIATES UR QL: NEGATIVE
OXYCODONE UR QL SCN: NEGATIVE
PCP UR QL SCN: NEGATIVE
SARS-COV-2 AG UPPER RESP QL IA.RAPID: NOT DETECTED

## 2023-05-18 PROCEDURE — 73030 X-RAY EXAM OF SHOULDER: CPT

## 2023-05-18 PROCEDURE — 71046 X-RAY EXAM CHEST 2 VIEWS: CPT

## 2023-05-18 RX ORDER — SODIUM CHLORIDE FOR INHALATION 3 %
VIAL, NEBULIZER (ML) INHALATION
COMMUNITY
Start: 2023-03-20

## 2023-05-18 RX ORDER — METFORMIN HYDROCHLORIDE 500 MG/1
TABLET, EXTENDED RELEASE ORAL
COMMUNITY
Start: 2023-03-20

## 2023-05-18 RX ORDER — LOSARTAN POTASSIUM 50 MG/1
TABLET ORAL
COMMUNITY
Start: 2023-03-20

## 2023-05-18 RX ORDER — BUDESONIDE, GLYCOPYRROLATE, AND FORMOTEROL FUMARATE 160; 9; 4.8 UG/1; UG/1; UG/1
AEROSOL, METERED RESPIRATORY (INHALATION)
COMMUNITY
Start: 2023-03-20

## 2023-05-18 NOTE — PROGRESS NOTES
Preston Wallis presents to [facility].    Chief Complaint:  Annual physical    Subjective       History of Present Illness:  HPI     Time he is in today for follow-up for his type 1 diabetes with chronic diabetic kidney disease, neurogenic bladder requiring catheterization and peripheral neuropathy.  Today his BS is 301 today, he is now seeing endocrinology specialist Neli Paredes.     Compliance with treatment has been better since he has nurse care coordinator helping him with his medications.  He is taking his medication as directed.  He does not follow any exercise program and is not good about following a low carbohydrate diet. Tobacco screen: Non-smoker/Past smoker.  Current meds include insulin/injectable ( SSI, lantus 40 units nightly ), bydureon and farxiga.  He now has a continuous monitor for blood sugars so he is able to follow his blood sugars closely.  He had another fall recently and skinned up his left shin.  No signs of infection today.  Foot exam is up-to-date and done last office visit.  He still is overdue for his eye exam.  Last eye exam was 4/2020..    Gómez now has a diabetic bladder and will chronically need to be cathed.  He is tolerating the procedure well.  He sees urology Dr Cindy Magaña has congestive heart failure with preserved ejection fraction, diastolic dysfunction (in addition he has chronic lower extremity edema).  He continues to be stable on current meds.  He is on bumex.  He has improved with his medicine due to our care coordinator nurse Sima.  He is to avoid salt in his diet.    Denies CP/LE edema is mild     He is on positive pressure CPAP for obstructive sleep apnea at home and he is mostly compliant and tolerates well.     Iiron deficiency anemia and sees hematology Dr. Hanson.  He had 2 iron infusions in past, tolerates ferrous gluconate, on stool softener for constipation that is stable and well controlled.  Colonoscopy and EGD are UTD,  showed 1 8 mm polyp and gastritis, done by Dr Engel           Essential (primary) hypertension is overall stable and well-controlled on his current cardiac medication regimen includes a diuretic (bumex ), a beta-blocker ( Metoprolol 100 mg twice daily ), CCB, procardia, and hydralazine.  Compliance has been good with pill packs.  He is tolerating the medication well without side effects.  He has not kept a blood pressure diary.         He has h/o hospitalization for pseudomonas pneumonia in past, and he gets recurrent infections and sees Dr Chavez and is on intermittent tobramycin nebulizer.  He is also on albuterol/duo nebs and symbicort daily, albuterol nebulizers. He thinks he needs antibiotic for pneumonia, he feels bad, he is cold, he has fatigue with thick green productive cough.  No covid exposure      He has h/o PE and is on eliquis      H/o afib and is in NSR, on BB and eliquis, SEES DR MILLER denies chest pain or heart palpitations today.     He lives with chronic PN pain and RLS at night, he is overall stable and doing well on the gabapentin.       Chronic L knee pain, his pain is severe, he cant stand or balance due to pain, he has an ortho appt with Arti dudleyal for knee replacement.     He is not sleeping well at night, he sleeps during the day, on melatonin 2.5 mg, falls asleep but when he wakes up it can take hours to fall asleep       Review of Systems:  Review of Systems   Constitutional: Positive for fatigue. Negative for chills and fever.   HENT: Negative for congestion, ear discharge and sore throat.    Respiratory: Positive for shortness of breath and wheezing.    Cardiovascular: Negative for chest pain.   Gastrointestinal: Positive for constipation. Negative for abdominal pain, diarrhea, nausea, vomiting and GERD.   Genitourinary: Negative for flank pain.   Neurological: Positive for weakness. Negative for dizziness and headache.   Psychiatric/Behavioral: Negative for sleep disturbance,  suicidal ideas and depressed mood. The patient is not nervous/anxious.         Objective   Medical History:  Past Medical History:   • Age-related cognitive decline   • Allergic contact dermatitis   • Allergies   • Anemia   • Bronchiectasis with acute lower respiratory infection   • Charcot foot due to diabetes mellitus   • Chronic diastolic (congestive) heart failure   • Chronic kidney disease   • Chronic respiratory failure with hypoxia   • Closed supracondylar fracture of femur   • COPD (chronic obstructive pulmonary disease)   • Deep vein thrombosis (DVT) of lower extremity associated with air travel   • Dependence on supplemental oxygen   • Eczema   • Erectile dysfunction    due to organic reasons   • Essential (primary) hypertension   • Fracture    closed fracture of other tarsal and metatarsal bones   • Fracture of proximal humerus   • GERD without esophagitis   • High risk medication use   • Hypercholesteremia   • Hypomagnesemia   • Infected stasis ulcer of left lower extremity   • Insomnia   • Low back pain   • Major depressive disorder   • Morbid (severe) obesity due to excess calories   • MRSA pneumonia   • Muscle weakness   • 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 without evidence of necrosis   • Obstructive sleep apnea (adult) (pediatric)   • Other forms of dyspnea   • Other long term (current) drug therapy   • Other specified noninfective gastroenteritis and colitis   • Other spondylosis, lumbar region   • Pain in both knees   • Paroxysmal atrial fibrillation   • Peripheral neuropathy    attributed to type 2 diabetes   • Pneumonia, unspecified organism   • Polyneuropathy   • Rash and other nonspecific skin eruption   • Syncope and collapse   • Tachycardia   • Tinnitus   • Type 1 diabetes mellitus with diabetic chronic kidney disease   • Type 2 diabetes mellitus   • Unspecified fall, initial encounter   • Urinary retention     Past Surgical History:   •  CHOLECYSTECTOMY   • CYSTOSCOPY   • FEMUR SURGERY    Shravan placed   • KNEE SURGERY   • OTHER SURGICAL HISTORY    venous port   • TONSILLECTOMY AND ADENOIDECTOMY      Family History   Problem Relation Age of Onset   • Coronary artery disease Mother    • Hypertension Mother    • Diabetes type II Mother    • Asthma Father    • Diabetes type II Sister    • Cancer Sister      Social History     Tobacco Use   • Smoking status: Former     Packs/day: 1.00     Years: 12.00     Pack years: 12.00     Types: Cigarettes     Start date:      Quit date:      Years since quittin.3   • Smokeless tobacco: Never   Substance Use Topics   • Alcohol use: Not Currently       Health Maintenance Due   Topic Date Due   • LIPID PANEL  2023        Immunization History   Administered Date(s) Administered   • Flu Vaccine Quad PF >36MO 10/18/2016, 10/16/2017, 2019   • Flu Vaccine Split Quad 2019   • FluLaval/Fluzone >6mos 10/18/2016, 10/16/2017, 2019, 10/19/2022   • Influenza Injectable Mdck Pf Quad 10/19/2022   • Influenza Quad Vaccine (Inpatient) 2013   • Influenza, Unspecified 2020   • Pneumococcal Polysaccharide (PPSV23) 1997   • Tdap 2018       Allergies   Allergen Reactions   • Benadryl [Diphenhydramine] Itching   • Proventil [Albuterol] Other (See Comments)     Mouth sores          Medications:  Current Outpatient Medications on File Prior to Visit   Medication Sig   • albuterol sulfate  (90 Base) MCG/ACT inhaler Inhale 2 puffs 4 (Four) Times a Day As Needed for Wheezing.   • apixaban (Eliquis) 5 MG tablet tablet Take 1 tablet by mouth Every 12 (Twelve) Hours.   • atorvastatin (LIPITOR) 20 MG tablet Take 1 tablet by mouth Every Night.   • Budeson-Glycopyrrol-Formoterol (Breztri Aerosphere) 160-9-4.8 MCG/ACT aerosol inhaler    • bumetanide (BUMEX) 2 MG tablet Take 1 tablet BY MOUTH TWICE DAILY. call cardiology IF weight is greater THAN 2 pounds in 1 DAY OR 5 pounds in A WEEK.    • calcium citrate (CALCITRATE) 950 (200 Ca) MG tablet Take 1 tablet by mouth Daily.   • Cholecalciferol (Vitamin D3) 50 MCG (2000 UT) tablet Take 1 tablet BY MOUTH ONCE DAILY   • Continuous Blood Gluc  (FreeStyle Bethany 2 [place]) device    • Continuous Blood Gluc Sensor (FreeStyle Bethany 2 Sensor) misc 1 each Every 14 (Fourteen) Days.   • Dextromethorphan-guaiFENesin (Mucus Relief DM)  MG tablet sustained-release 12 hour Take 1 tablet BY MOUTH TWICE DAILY AS NEEDED FOR congestion   • Diclofenac Sodium (VOLTAREN) 1 % gel gel APPLY 4 GRAMS TO APPROPRIATE AREA FOUR TIMES DAILY AS NEEDED FOR ARTHRITIS FOR UP TO 30 DAYS   • docusate sodium (COLACE) 100 MG capsule Take 1 capsule by mouth 2 (Two) Times a Day.   • DULoxetine (CYMBALTA) 60 MG capsule Take 1 capsule by mouth 2 (Two) Times a Day.   • exenatide er (Bydureon BCise) 2 MG/0.85ML auto-injector injection Inject 0.85 mL under the skin into the appropriate area as directed 1 (One) Time Per Week for 168 days.   • famotidine (PEPCID) 40 MG tablet Take 1 tablet by mouth Every Morning.   • Farxiga 5 MG tablet tablet TAKE 1 TABLET BY MOUTH ONCE DAILY   • ferrous gluconate (FERGON) 324 MG tablet Take 1 tablet BY MOUTH EVERY DAY with breakfast   • finasteride (PROSCAR) 5 MG tablet Take 1 tablet BY MOUTH EVERY DAY   • Finerenone 10 MG tablet Take 1 tablet by mouth.   • folic acid (FOLVITE) 1 MG tablet Take 1 tablet BY MOUTH EVERY DAY   • gabapentin (NEURONTIN) 300 MG capsule TAKE ONE CAPSULE BY MOUTH EVERY MORNING AND TAKE TWO CAPSULES BY MOUTH EVERY NIGHT AT BEDTIME   • glucose blood test strip 1 each by Other route Daily. Dx:   E11.40   • heparin 100 UNIT/ML solution injection 5 mL by Intracatheter route Every 30 (Thirty) Days.   • hydrALAZINE (APRESOLINE) 50 MG tablet TAKE 1 TABLET BY MOUTH TWICE DAILY   • Insulin Lispro, 1 Unit Dial, (HUMALOG) 100 UNIT/ML solution pen-injector inject EIGHT units UNDER THE SKIN INTO THE APPROPRIATE AREA THREE TIMES DAILY PER  "sliding scale, IF BLOOD SUGAR < 160= 0 units, 161-220= 2 units, 221-280= 4 units, 281-340 = SIX units, 341-400 = EIGHT units   • Insulin Pen Needle (B-D UF III MINI PEN NEEDLES) 31G X 5 MM misc 1 each by Other route 4 (Four) Times a Day Before Meals & at Bedtime for 90 days.   • isosorbide mononitrate (IMDUR) 30 MG 24 hr tablet Take 1 tablet by mouth Daily.   • Lantus SoloStar 100 UNIT/ML injection pen INJECT 40 UNITS UNDER THE SKIN ONCE DAILY   • losartan (COZAAR) 25 MG tablet Take 1 tablet by mouth Every Morning.   • losartan (COZAAR) 50 MG tablet    • Melatonin-Magnesium Citrate 1-71.5 MG tablet Take 1 tablet by mouth Every Night.   • metFORMIN ER (GLUCOPHAGE-XR) 500 MG 24 hr tablet    • metoprolol tartrate (LOPRESSOR) 100 MG tablet Take 1 tablet by mouth 2 (Two) Times a Day.   • montelukast (SINGULAIR) 10 MG tablet TAKE 1 TABLET BY MOUTH EVERY NIGHT AT BEDTIME   • NIFEdipine XL (PROCARDIA XL) 30 MG 24 hr tablet Take 1 tablet by mouth Every Morning.   • O2 (OXYGEN) 2 Liter O2 - CONTINUOUS (route: Oxygen)   • povidone-iodine (Betadine) 10 % external solution Apply  topically to the appropriate area as directed As Needed for Wound Care.   • sodium chloride 0.9 % injection Infuse 10 mL into a venous catheter Every 30 (Thirty) Days. Standing order.  Port flush q 30 days - 5mL Heparin   • sodium chloride 3 % nebulizer solution TAKE 4 MLS BY NEBULIZER TWICE DAILY   • sodium chloride 3 % nebulizer solution    • traMADol (ULTRAM) 50 MG tablet Take 1 tablet by mouth Every 6 (Six) Hours As Needed for Moderate Pain.   • TRUEplus Lancets 28G misc USE AS DIRECTED   • ipratropium-albuterol (DUO-NEB) 0.5-2.5 mg/3 ml nebulizer Take 3 mL by nebulization 4 (Four) Times a Day As Needed for Wheezing or Shortness of Air for up to 30 days.     No current facility-administered medications on file prior to visit.       Vital Signs:   /43 (BP Location: Right arm, Patient Position: Sitting)   Pulse 75   Ht 175.3 cm (69.02\")   " SpO2 94% Comment: O2 mask.  BMI 42.81 kg/m²       Physical Exam:  Physical Exam  Vitals and nursing note reviewed.   Constitutional:       General: He is not in acute distress.     Appearance: Normal appearance. He is obese. He is not ill-appearing, toxic-appearing or diaphoretic.   HENT:      Head: Normocephalic and atraumatic.      Right Ear: Tympanic membrane, ear canal and external ear normal.      Left Ear: Tympanic membrane, ear canal and external ear normal.      Nose: No congestion or rhinorrhea.      Mouth/Throat:      Mouth: Mucous membranes are moist.      Pharynx: Oropharynx is clear. No oropharyngeal exudate or posterior oropharyngeal erythema.   Eyes:      Extraocular Movements: Extraocular movements intact.      Conjunctiva/sclera: Conjunctivae normal.      Pupils: Pupils are equal, round, and reactive to light.   Cardiovascular:      Rate and Rhythm: Normal rate and regular rhythm.      Heart sounds: Normal heart sounds. No murmur heard.  Pulmonary:      Effort: Pulmonary effort is normal.      Breath sounds: Wheezing present. No rhonchi or rales.   Abdominal:      General: Abdomen is flat. There is distension.      Palpations: Abdomen is soft. There is no mass.      Tenderness: There is no abdominal tenderness. There is no guarding or rebound.      Hernia: No hernia is present.   Musculoskeletal:      Cervical back: Neck supple. No rigidity.   Lymphadenopathy:      Cervical: No cervical adenopathy.   Skin:     General: Skin is warm and dry.          Neurological:      Mental Status: He is alert and oriented to person, place, and time.      Cranial Nerves: No cranial nerve deficit.      Motor: Weakness present.      Gait: Gait abnormal.      Deep Tendon Reflexes: Reflexes normal.   Psychiatric:         Mood and Affect: Mood normal.         Behavior: Behavior normal.         Thought Content: Thought content normal.         Judgment: Judgment normal.         Result Review      The following data was  reviewed by Kimmy Riley MD on 02/07/2023.  Lab Results   Component Value Date    WBC 11.04 (H) 05/08/2023    HGB 10.2 (L) 05/08/2023    HCT 33.1 (L) 05/08/2023    MCV 90.7 05/08/2023     05/08/2023     Lab Results   Component Value Date    GLUCOSE 167 (H) 05/08/2023    BUN 29 (H) 05/08/2023    CREATININE 1.73 (H) 05/08/2023    EGFR 45.5 (L) 05/08/2023    BCR 16.8 05/08/2023    K 4.5 05/08/2023    CO2 32.7 (H) 05/08/2023    CALCIUM 9.3 05/08/2023    PROTENTOTREF 7.1 08/22/2022    ALBUMIN 3.5 05/08/2023    LABIL2 0.8 08/22/2022    AST 19 06/22/2022    ALT 20 06/22/2022     Lab Results   Component Value Date    CHOL 131 03/17/2022    CHLPL 165 08/26/2020    TRIG 69 03/17/2022    HDL 65 (H) 03/17/2022    LDL 52 03/17/2022     Lab Results   Component Value Date    TSH 0.580 03/17/2020     Lab Results   Component Value Date    HGBA1C 8.9 04/28/2023     Lab Results   Component Value Date    PSA 0.725 03/17/2022                         Assessment and Plan:          Diagnoses and all orders for this visit:    1. Bronchitis (Primary)  Comments:  Recommend he start his Mucinex, albuterol nebulizer 4 times a day, and I will x-ray his chest and check for COVID and flu today  Orders:  -     XR Chest PA & Lateral; Future  -     POCT SARS-CoV-2 Antigen BRENDA + Flu    2. Long-term use of high-risk medication  -     POC Urine Drug Screen [facility]    3. Poorly controlled type 2 diabetes mellitus with neuropathy  Comments:  He is starting to do much better, continue current meds and close follow-up with Neli Paredes    4. Type 2 diabetes mellitus with diabetic neuropathy, with long-term current use of insulin    5. Chronic diastolic (congestive) heart failure  Comments:  No acute issues today.  Stable on medication    6. Hyperparathyroidism  Comments:  We will set up an ENT referral to further eval need for hyperthyroid treatment.  His calcium levels are normal and he is on calcium supplementation    7. Peripheral  polyneuropathy  Comments:  Chronic but stable with gabapentin.  Today is his F2F and a urine tox was performed and appropriate.  No SI/SX diversion or abuse    8. Chronic bilateral low back pain with bilateral sciatica  Comments:  Stable on current medication.  Tramadol be refilled today's is F2 F.  He tolerates medication well    9. Essential (primary) hypertension  Comments:  Blood pressure stable and very well controlled on current meds.  No changes needed in current meds or treatment plan.  He is to avoid sodium in his diet    10. Iron deficiency anemia secondary to inadequate dietary iron intake  Comments:  Continue iron supplementation, tolerates ferrous gluconate well.  Anemia profile stable    11. Stage 3b chronic kidney disease (CKD)  Comments:  Labs are stable.  Reviewed with him today.  He is to avoid NSAIDs and push fluids 64 ounces a day    12. Primary insomnia  Comments:  Increase melatonin to 5 mg.  He has been counseled on good sleep hygiene and handout was given to them today    13. Cloudy urine  Comments:  We will rule out UTI, urinalysis performed  Orders:  -     Urinalysis With Culture If Indicated -; Future    14. Chronic right shoulder pain  Comments:  Chronic, will x-ray right shoulder to further eval for degenerative joint disease/osteoarthritis  Orders:  -     XR Shoulder 2+ View Right; Future          Follow Up   No follow-ups on file.

## 2023-05-18 NOTE — OUTREACH NOTE
AMBULATORY CASE MANAGEMENT NOTE    Name and Relationship of Patient/Support Person:  -     Gómez came in today and brought medications for fill. Present during office visit and medication adjustments made accordingly to his melatonin.    He is being referred to ENT for hyperparathyroid, additional testing needed - Gómez and Kami aware.  Will create an encounter for follow up.      Education Documentation  No documentation found.        Tara GOMEZ  Ambulatory Case Management    5/18/2023, 14:11 EDT

## 2023-05-19 ENCOUNTER — PATIENT OUTREACH (OUTPATIENT)
Dept: CASE MANAGEMENT | Facility: OTHER | Age: 58
End: 2023-05-19
Payer: COMMERCIAL

## 2023-05-19 DIAGNOSIS — E11.65 UNCONTROLLED TYPE 2 DIABETES MELLITUS WITH HYPERGLYCEMIA, WITH LONG-TERM CURRENT USE OF INSULIN: Primary | ICD-10-CM

## 2023-05-19 DIAGNOSIS — Z79.4 UNCONTROLLED TYPE 2 DIABETES MELLITUS WITH HYPERGLYCEMIA, WITH LONG-TERM CURRENT USE OF INSULIN: Primary | ICD-10-CM

## 2023-05-19 NOTE — OUTREACH NOTE
"AMBULATORY CASE MANAGEMENT NOTE    Name and Relationship of Patient/Support Person: Preston Wallis \"Gómez\" - Self    Called Gómez to follow up on x-ray results.  Outreach created for follow up.  He is agreeable to have testing.        Tara GOMEZ  Ambulatory Case Management    5/19/2023, 09:11 EDT  "

## 2023-05-24 DIAGNOSIS — Z79.899 HIGH RISK MEDICATION USE: Primary | ICD-10-CM

## 2023-05-25 ENCOUNTER — LAB (OUTPATIENT)
Dept: LAB | Facility: HOSPITAL | Age: 58
End: 2023-05-25
Payer: COMMERCIAL

## 2023-05-25 DIAGNOSIS — N18.31 CHRONIC KIDNEY DISEASE (CKD) STAGE G3A/A1, MODERATELY DECREASED GLOMERULAR FILTRATION RATE (GFR) BETWEEN 45-59 ML/MIN/1.73 SQUARE METER AND ALBUMINURIA CREATININE RATIO LESS THAN 30 MG/G (CMS/H*: ICD-10-CM

## 2023-05-25 DIAGNOSIS — N25.81 HYPERPARATHYROIDISM DUE TO RENAL INSUFFICIENCY: ICD-10-CM

## 2023-05-25 DIAGNOSIS — E55.9 VITAMIN D DEFICIENCY: ICD-10-CM

## 2023-05-25 DIAGNOSIS — Z79.899 HIGH RISK MEDICATION USE: Primary | ICD-10-CM

## 2023-05-25 DIAGNOSIS — R82.90 CLOUDY URINE: ICD-10-CM

## 2023-05-25 DIAGNOSIS — Z12.5 PROSTATE CANCER SCREENING: ICD-10-CM

## 2023-05-25 DIAGNOSIS — D50.8 IRON DEFICIENCY ANEMIA SECONDARY TO INADEQUATE DIETARY IRON INTAKE: ICD-10-CM

## 2023-05-25 LAB
25(OH)D3 SERPL-MCNC: 25.5 NG/ML (ref 30–100)
ALBUMIN SERPL-MCNC: 3.6 G/DL (ref 3.5–5.2)
AMPHET+METHAMPHET UR QL: NEGATIVE
ANION GAP SERPL CALCULATED.3IONS-SCNC: 10 MMOL/L (ref 5–15)
BACTERIA UR QL AUTO: ABNORMAL /HPF
BARBITURATES UR QL SCN: NEGATIVE
BENZODIAZ UR QL SCN: NEGATIVE
BILIRUB UR QL STRIP: NEGATIVE
BUN SERPL-MCNC: 33 MG/DL (ref 6–20)
BUN/CREAT SERPL: 19.3 (ref 7–25)
CALCIUM SPEC-SCNC: 9.1 MG/DL (ref 8.6–10.5)
CANNABINOIDS SERPL QL: NEGATIVE
CHLORIDE SERPL-SCNC: 96 MMOL/L (ref 98–107)
CLARITY UR: CLEAR
CO2 SERPL-SCNC: 31 MMOL/L (ref 22–29)
COCAINE UR QL: NEGATIVE
COLOR UR: YELLOW
CREAT SERPL-MCNC: 1.71 MG/DL (ref 0.76–1.27)
DEPRECATED RDW RBC AUTO: 43.5 FL (ref 37–54)
EGFRCR SERPLBLD CKD-EPI 2021: 46.1 ML/MIN/1.73
ERYTHROCYTE [DISTWIDTH] IN BLOOD BY AUTOMATED COUNT: 13.2 % (ref 12.3–15.4)
FENTANYL UR-MCNC: NEGATIVE NG/ML
FERRITIN SERPL-MCNC: 63.3 NG/ML (ref 30–400)
FOLATE SERPL-MCNC: >20 NG/ML (ref 4.78–24.2)
GLUCOSE SERPL-MCNC: 278 MG/DL (ref 65–99)
GLUCOSE UR STRIP-MCNC: ABNORMAL MG/DL
HCT VFR BLD AUTO: 32.7 % (ref 37.5–51)
HGB BLD-MCNC: 10.2 G/DL (ref 13–17.7)
HGB UR QL STRIP.AUTO: ABNORMAL
IRON 24H UR-MRATE: 66 MCG/DL (ref 59–158)
IRON SATN MFR SERPL: 22 % (ref 20–50)
KETONES UR QL STRIP: NEGATIVE
LEUKOCYTE ESTERASE UR QL STRIP.AUTO: NEGATIVE
MCH RBC QN AUTO: 28.1 PG (ref 26.6–33)
MCHC RBC AUTO-ENTMCNC: 31.2 G/DL (ref 31.5–35.7)
MCV RBC AUTO: 90.1 FL (ref 79–97)
METHADONE UR QL SCN: NEGATIVE
NITRITE UR QL STRIP: NEGATIVE
OPIATES UR QL: NEGATIVE
OXYCODONE UR QL SCN: NEGATIVE
PH UR STRIP.AUTO: 6.5 [PH] (ref 5–8)
PHOSPHATE SERPL-MCNC: 4 MG/DL (ref 2.5–4.5)
PLATELET # BLD AUTO: 397 10*3/MM3 (ref 140–450)
PMV BLD AUTO: 8.6 FL (ref 6–12)
POTASSIUM SERPL-SCNC: 5 MMOL/L (ref 3.5–5.2)
PROT UR QL STRIP: ABNORMAL
PSA SERPL-MCNC: 0.2 NG/ML (ref 0–4)
RBC # BLD AUTO: 3.63 10*6/MM3 (ref 4.14–5.8)
RBC # UR STRIP: ABNORMAL /HPF
REF LAB TEST METHOD: ABNORMAL
SODIUM SERPL-SCNC: 137 MMOL/L (ref 136–145)
SP GR UR STRIP: 1.02 (ref 1–1.03)
SQUAMOUS #/AREA URNS HPF: ABNORMAL /HPF
T-UPTAKE NFR SERPL: 0.96 TBI (ref 0.8–1.3)
T4 SERPL-MCNC: 7.44 MCG/DL (ref 4.5–11.7)
TIBC SERPL-MCNC: 304 MCG/DL (ref 298–536)
TRANSFERRIN SERPL-MCNC: 204 MG/DL (ref 200–360)
TSH SERPL DL<=0.05 MIU/L-ACNC: 0.95 UIU/ML (ref 0.27–4.2)
UROBILINOGEN UR QL STRIP: ABNORMAL
VIT B12 BLD-MCNC: 475 PG/ML (ref 211–946)
WBC # UR STRIP: ABNORMAL /HPF
WBC NRBC COR # BLD: 12.27 10*3/MM3 (ref 3.4–10.8)

## 2023-05-25 PROCEDURE — 80307 DRUG TEST PRSMV CHEM ANLYZR: CPT

## 2023-05-25 PROCEDURE — 82607 VITAMIN B-12: CPT

## 2023-05-25 PROCEDURE — 36415 COLL VENOUS BLD VENIPUNCTURE: CPT

## 2023-05-25 PROCEDURE — 85027 COMPLETE CBC AUTOMATED: CPT

## 2023-05-25 PROCEDURE — 82728 ASSAY OF FERRITIN: CPT

## 2023-05-25 PROCEDURE — 84479 ASSAY OF THYROID (T3 OR T4): CPT

## 2023-05-25 PROCEDURE — G0103 PSA SCREENING: HCPCS

## 2023-05-25 PROCEDURE — 84466 ASSAY OF TRANSFERRIN: CPT

## 2023-05-25 PROCEDURE — 82306 VITAMIN D 25 HYDROXY: CPT

## 2023-05-25 PROCEDURE — 83540 ASSAY OF IRON: CPT

## 2023-05-25 PROCEDURE — 84443 ASSAY THYROID STIM HORMONE: CPT

## 2023-05-25 PROCEDURE — 81001 URINALYSIS AUTO W/SCOPE: CPT

## 2023-05-25 PROCEDURE — 84436 ASSAY OF TOTAL THYROXINE: CPT

## 2023-05-25 PROCEDURE — 82746 ASSAY OF FOLIC ACID SERUM: CPT

## 2023-05-25 PROCEDURE — 83970 ASSAY OF PARATHORMONE: CPT

## 2023-05-25 PROCEDURE — 80069 RENAL FUNCTION PANEL: CPT

## 2023-05-26 LAB — PTH-INTACT SERPL-MCNC: 86.1 PG/ML (ref 15–65)

## 2023-05-30 ENCOUNTER — PATIENT OUTREACH (OUTPATIENT)
Dept: CASE MANAGEMENT | Facility: OTHER | Age: 58
End: 2023-05-30

## 2023-05-30 ENCOUNTER — HOSPITAL ENCOUNTER (OUTPATIENT)
Dept: ULTRASOUND IMAGING | Facility: HOSPITAL | Age: 58
Discharge: HOME | End: 2023-05-30
Admitting: FAMILY MEDICINE

## 2023-05-30 DIAGNOSIS — N25.81 HYPERPARATHYROIDISM DUE TO RENAL INSUFFICIENCY: ICD-10-CM

## 2023-05-30 DIAGNOSIS — M54.41 CHRONIC BILATERAL LOW BACK PAIN WITH BILATERAL SCIATICA: ICD-10-CM

## 2023-05-30 DIAGNOSIS — Z79.4 UNCONTROLLED TYPE 2 DIABETES MELLITUS WITH HYPERGLYCEMIA, WITH LONG-TERM CURRENT USE OF INSULIN: Primary | ICD-10-CM

## 2023-05-30 DIAGNOSIS — M54.42 CHRONIC BILATERAL LOW BACK PAIN WITH BILATERAL SCIATICA: ICD-10-CM

## 2023-05-30 DIAGNOSIS — G89.29 CHRONIC BILATERAL LOW BACK PAIN WITH BILATERAL SCIATICA: ICD-10-CM

## 2023-05-30 DIAGNOSIS — I48.0 PAROXYSMAL ATRIAL FIBRILLATION: ICD-10-CM

## 2023-05-30 DIAGNOSIS — E11.65 UNCONTROLLED TYPE 2 DIABETES MELLITUS WITH HYPERGLYCEMIA, WITH LONG-TERM CURRENT USE OF INSULIN: Primary | ICD-10-CM

## 2023-05-30 DIAGNOSIS — G62.9 PERIPHERAL POLYNEUROPATHY: ICD-10-CM

## 2023-05-30 DIAGNOSIS — N18.31 STAGE 3A CHRONIC KIDNEY DISEASE: ICD-10-CM

## 2023-05-30 PROCEDURE — 76536 US EXAM OF HEAD AND NECK: CPT

## 2023-05-30 NOTE — OUTREACH NOTE
AMBULATORY CASE MANAGEMENT NOTE    Name and Relationship of Patient/Support Person:  -     Labs and ultrasound completed, proceed with ENT referral. Referrals notified.     Tara GOMEZ  Ambulatory Case Management    5/30/2023, 18:34 EDT     CCM End of Month Documentation    This Chronic Medical Management Care Plan for Preston Wallis, 57 y.o. male, has been established; monitored and managed; reviewed and a new plan of care implemented for the month of May.  A cumulative time of 36  minutes was spent on this patient record this month, including phone call with care giver; electronic communication with primary care provider; electronic communication with pharmacist; chart review; phone call with pharmacist.    Regarding the patient's problems: has Polyneuropathy; Paroxysmal atrial fibrillation; Obstructive sleep apnea; MRSA pneumonia; Low back pain; Chronic diastolic heart failure; Allergies; COPD exacerbation; Chronic anticoagulation; Benign prostatic hyperplasia; Impaired mobility and endurance; Stage 3a chronic kidney disease; Iron deficiency anemia secondary to inadequate dietary iron intake; Vitamin D deficiency; Class 3 severe obesity with serious comorbidity in adult; Lower extremity edema; Elevated alkaline phosphatase level; Venous insufficiency (chronic) (peripheral); Tobacco abuse, in remission; History of Pseudomonas pneumonia; Chronic dyspnea; Gastroesophageal reflux disease; Bronchiectasis without complication; Altered mental status; Hyperlipidemia; Luetscher's syndrome; Neurogenic bladder; Class 1 obesity; Pneumonia due to Pseudomonas species; Seizures; Primary osteoarthritis of left knee; Other constipation; Chronic obstructive pulmonary disease; Type 2 DM with CKD stage 3 and hypertension; Essential (primary) hypertension; Stage 3b chronic kidney disease (CKD); Annual physical exam; Long-term use of high-risk medication; Personal history of PE (pulmonary embolism); Encounter for aftercare for healing  closed traumatic fracture of left femur; Chronic pain of left knee; and Primary osteoarthritis of right knee on their problem list., the following items were addressed: medications; transitions to medical care; medical records; referrals to community service providers and any changes can be found within the plan section of the note.  A detailed listing of time spent for chronic care management is tracked within each outreach encounter.  Current medications include:  has a current medication list which includes the following prescription(s): albuterol sulfate hfa, apixaban, atorvastatin, breztri aerosphere, bumetanide, calcium citrate, vitamin d3, freestyle clau 2 reader, freestyle clau 2 sensor, mucus relief dm, diclofenac sodium, docusate sodium, duloxetine, bydureon bcise, famotidine, farxiga, ferrous gluconate, finasteride, finerenone, folic acid, gabapentin, glucose blood, heparin, hydralazine, insulin lispro (1 unit dial), b-d uf iii mini pen needles, ipratropium-albuterol, isosorbide mononitrate, lantus solostar, losartan, losartan, melatonin-magnesium citrate, metformin er, metoprolol tartrate, montelukast, nifedipine xl, o2, povidone-iodine, sodium chloride, sodium chloride, sodium chloride, tramadol, and trueplus lancets 28g. and the patient is reported to be caregiver will take responsibility for med compliance,  Medications are reported to be non-effective in controlling symptoms and changes have been made to the medication protocol.  Regarding these diagnoses, referrals were made to the following provider(s):  specialists.  All notes on chart for PCP to review.    The patient was monitored remotely for pain; medications.    The patient's physical needs include:  physician referral; help taking medications as prescribed; medication education; needs assistance with ADLs; resources for disability needs; DME supplies.     The patient's mental support needs include:  continued support    The patient's  cognitive support needs include:  medication; continued support; needs assistance with ADLs; health care    The patient's psychosocial support needs include:  continued support; coordination of community providers; medication management or adherence, Has great supportive family.    The patient's functional needs include: health care coverage; medication education; needs assistance for ADLs; physical healthcare; physician referral; resources for disability needs, Limited mobility.    The patient's environmental needs include:  resources for disability needs    Care Plan overall comments:  Still not at goal, however improving. will continue to follow. Has many upcoming appointments and referrals.    Refer to previous outreach notes for more information on the areas listed above.    Monthly Billing Diagnoses  (E11.65,  Z79.4) Uncontrolled type 2 diabetes mellitus with hyperglycemia, with long-term current use of insulin    (N18.31) Stage 3a chronic kidney disease    (G62.9) Peripheral polyneuropathy    (I48.0) Paroxysmal atrial fibrillation    (M54.42,  M54.41,  G89.29) Chronic bilateral low back pain with bilateral sciatica    Medications   · Medications have been reconciled    Care Plan progress this month:      Recently Modified Care Plans Updates made since 4/29/2023 12:00 AM    No recently modified care plans.          · Current Specialty Plan of Care Status signed by both patient and provider    Instructions   · Patient was provided an electronic copy of care plan  · CCM services were explained and offered and patient has accepted these services.  · Patient has given their written consent to receive CCM services and understands that this includes the authorization of electronic communication of medical information with the other treating providers.  · Patient understands that they may stop CCM services at any time and these changes will be effective at the end of the calendar month and will effectively revocate  the agreement of CCM services.  · Patient understands that only one practitioner can furnish and be paid for CCM services during one calendar month.  Patient also understands that there may be co-payment or deductible fees in association with CCM services.  · Patient will continue with at least monthly follow-up calls with the Ambulatory .    Tara GOMEZ  Ambulatory Case Management    5/30/2023, 18:39 EDT

## 2023-06-06 ENCOUNTER — PATIENT OUTREACH (OUTPATIENT)
Dept: CASE MANAGEMENT | Facility: OTHER | Age: 58
End: 2023-06-06
Payer: COMMERCIAL

## 2023-06-06 DIAGNOSIS — N18.32 STAGE 3B CHRONIC KIDNEY DISEASE (CKD): ICD-10-CM

## 2023-06-06 DIAGNOSIS — Z79.4 UNCONTROLLED TYPE 2 DIABETES MELLITUS WITH HYPERGLYCEMIA, WITH LONG-TERM CURRENT USE OF INSULIN: Primary | ICD-10-CM

## 2023-06-06 DIAGNOSIS — E11.65 UNCONTROLLED TYPE 2 DIABETES MELLITUS WITH HYPERGLYCEMIA, WITH LONG-TERM CURRENT USE OF INSULIN: Primary | ICD-10-CM

## 2023-06-06 RX ORDER — FOLIC ACID 1 MG/1
1000 TABLET ORAL DAILY
Qty: 90 TABLET | Refills: 1 | Status: SHIPPED | OUTPATIENT
Start: 2023-06-06

## 2023-06-06 NOTE — OUTREACH NOTE
AMBULATORY CASE MANAGEMENT NOTE    Name and Relationship of Patient/Support Person:  -     Gómez and Elizabeth brought in medications today for filling ( pre-plan).  Also medication changes from nephrology.  Informed pharmacy of discontinuation.  Informed nephrology that he was unable to get a medication that was prescribed, still remaining on list.  Sent clarification for diabetes regarding a different medication.  Sent in refill and will need additional refills, outreach created for follow up.      Tara GOMEZ  Ambulatory Case Management    6/6/2023, 12:47 EDT

## 2023-06-12 DIAGNOSIS — G89.29 CHRONIC BILATERAL LOW BACK PAIN WITH BILATERAL SCIATICA: ICD-10-CM

## 2023-06-12 DIAGNOSIS — G62.9 PERIPHERAL POLYNEUROPATHY: ICD-10-CM

## 2023-06-12 DIAGNOSIS — M54.41 CHRONIC BILATERAL LOW BACK PAIN WITH BILATERAL SCIATICA: ICD-10-CM

## 2023-06-12 DIAGNOSIS — M54.42 CHRONIC BILATERAL LOW BACK PAIN WITH BILATERAL SCIATICA: ICD-10-CM

## 2023-06-12 RX ORDER — IBUPROFEN 200 MG
CAPSULE ORAL
Qty: 90 TABLET | Refills: 3 | Status: SHIPPED | OUTPATIENT
Start: 2023-06-12

## 2023-06-14 RX ORDER — GABAPENTIN 300 MG/1
CAPSULE ORAL
Qty: 90 CAPSULE | Refills: 0 | Status: SHIPPED | OUTPATIENT
Start: 2023-06-14

## 2023-06-15 ENCOUNTER — HOSPITAL ENCOUNTER (OUTPATIENT)
Dept: CT IMAGING | Facility: HOSPITAL | Age: 58
Discharge: HOME | End: 2023-06-15
Payer: COMMERCIAL

## 2023-06-15 DIAGNOSIS — R91.8 MASS OF UPPER LOBE OF RIGHT LUNG: ICD-10-CM

## 2023-06-15 PROCEDURE — 71250 CT THORAX DX C-: CPT

## 2023-06-16 ENCOUNTER — PATIENT OUTREACH (OUTPATIENT)
Dept: CASE MANAGEMENT | Facility: OTHER | Age: 58
End: 2023-06-16
Payer: COMMERCIAL

## 2023-06-16 DIAGNOSIS — N18.31 STAGE 3A CHRONIC KIDNEY DISEASE: Primary | ICD-10-CM

## 2023-06-16 NOTE — OUTREACH NOTE
"AMBULATORY CASE MANAGEMENT NOTE    Name and Relationship of Patient/Support Person: Preston Wallis \"Gómez\" - Self    Reached out to Gómez regarding his CT scan.  PCP has requested that the results be sent to pulmonology.  Called pulmonology to schedule follow up appointment - will mail upcoming appointments to patient.      Education Documentation  No documentation found.        Tara GOMEZ  Ambulatory Case Management    6/16/2023, 10:31 EDT  "

## 2023-07-23 DIAGNOSIS — E11.40 POORLY CONTROLLED TYPE 2 DIABETES MELLITUS WITH NEUROPATHY: ICD-10-CM

## 2023-07-23 DIAGNOSIS — Z79.4 TYPE 2 DIABETES MELLITUS WITH DIABETIC NEUROPATHY, WITH LONG-TERM CURRENT USE OF INSULIN: ICD-10-CM

## 2023-07-23 DIAGNOSIS — E11.40 TYPE 2 DIABETES MELLITUS WITH DIABETIC NEUROPATHY, WITH LONG-TERM CURRENT USE OF INSULIN: ICD-10-CM

## 2023-07-23 DIAGNOSIS — E11.65 POORLY CONTROLLED TYPE 2 DIABETES MELLITUS WITH NEUROPATHY: ICD-10-CM

## 2023-07-24 ENCOUNTER — OFFICE VISIT (OUTPATIENT)
Dept: ORTHOPEDIC SURGERY | Facility: CLINIC | Age: 58
End: 2023-07-24
Payer: COMMERCIAL

## 2023-07-24 VITALS
SYSTOLIC BLOOD PRESSURE: 131 MMHG | OXYGEN SATURATION: 97 % | BODY MASS INDEX: 42.21 KG/M2 | WEIGHT: 285 LBS | DIASTOLIC BLOOD PRESSURE: 87 MMHG | HEART RATE: 77 BPM | HEIGHT: 69 IN

## 2023-07-24 DIAGNOSIS — S42.201S CLOSED FRACTURE OF PROXIMAL END OF RIGHT HUMERUS, UNSPECIFIED FRACTURE MORPHOLOGY, SEQUELA: ICD-10-CM

## 2023-07-24 DIAGNOSIS — M19.011 PRIMARY OSTEOARTHRITIS OF RIGHT SHOULDER: Primary | ICD-10-CM

## 2023-07-24 DIAGNOSIS — M75.101 TEAR OF RIGHT ROTATOR CUFF, UNSPECIFIED TEAR EXTENT, UNSPECIFIED WHETHER TRAUMATIC: ICD-10-CM

## 2023-07-24 RX ORDER — INSULIN LISPRO 100 [IU]/ML
INJECTION, SOLUTION INTRAVENOUS; SUBCUTANEOUS
Qty: 15 ML | Refills: 1 | Status: SHIPPED | OUTPATIENT
Start: 2023-07-24

## 2023-07-24 RX ORDER — TRIAMCINOLONE ACETONIDE 40 MG/ML
40 INJECTION, SUSPENSION INTRA-ARTICULAR; INTRAMUSCULAR
Status: COMPLETED | OUTPATIENT
Start: 2023-07-24 | End: 2023-07-24

## 2023-07-24 RX ORDER — LIDOCAINE HYDROCHLORIDE 10 MG/ML
5 INJECTION, SOLUTION INFILTRATION; PERINEURAL
Status: COMPLETED | OUTPATIENT
Start: 2023-07-24 | End: 2023-07-24

## 2023-07-24 RX ADMIN — LIDOCAINE HYDROCHLORIDE 5 ML: 10 INJECTION, SOLUTION INFILTRATION; PERINEURAL at 09:20

## 2023-07-24 RX ADMIN — TRIAMCINOLONE ACETONIDE 40 MG: 40 INJECTION, SUSPENSION INTRA-ARTICULAR; INTRAMUSCULAR at 09:20

## 2023-07-24 NOTE — PROGRESS NOTES
"Chief Complaint  Pain and Initial Evaluation of the Right Shoulder    Subjective          Preston Wallis presents to [facility] for   History of Present Illness    The patient presents here today for evaluation of the right shoulder. He reports he fractured his shoulder several years ago. He is ambulating in a wheelchair wearing O2 that he wears full time. He has had x-rays and an MRI prior to todays visit.   Allergies   Allergen Reactions    Benadryl [Diphenhydramine] Itching    Proventil [Albuterol] Other (See Comments)     Mouth sores          Social History     Socioeconomic History    Marital status:    Tobacco Use    Smoking status: Former     Packs/day: 1.00     Years: 12.00     Pack years: 12.00     Types: Cigarettes     Start date:      Quit date:      Years since quittin.5    Smokeless tobacco: Never   Vaping Use    Vaping Use: Never used   Substance and Sexual Activity    Alcohol use: Not Currently    Drug use: Never    Sexual activity: Defer        I reviewed the patient's chief complaint, history of present illness, review of systems, past medical history, surgical history, family history, social history, medications, and allergy list.     REVIEW OF SYSTEMS    Constitutional: Denies fevers, chills, weight loss  Cardiovascular: Denies chest pain, shortness of breath  Skin: Denies rashes, acute skin changes  Neurologic: Denies headache, loss of consciousness  MSK: Right shoulder pain      Objective   Vital Signs:   /87   Pulse 77   Ht 175.3 cm (69\")   Wt 129 kg (285 lb)   SpO2 97%   BMI 42.09 kg/m²     Body mass index is 42.09 kg/m².    Physical Exam    General: Alert. No acute distress.   Right shoulder- Forward elevation 30, External Rotation 10. Internal rotation to the lateral hip. Forward elevation passive, 60, 3/5 supraspinatus and infraspinatus, 4/5 subscapularis. Neurovascularly intact. No wounds about the shoulder.     Large Joint " Arthrocentesis  Date/Time: 7/24/2023 9:20 AM  Consent given by: patient  Site marked: site marked  Timeout: Immediately prior to procedure a time out was called to verify the correct patient, procedure, equipment, support staff and site/side marked as required   Supporting Documentation  Indications: pain   Procedure Details  Location: shoulder (RIGHT) -   Needle gauge: 21 G.  Medications administered: 5 mL lidocaine 1 %; 40 mg triamcinolone acetonide 40 MG/ML  Patient tolerance: patient tolerated the procedure well with no immediate complications      Imaging Results (Most Recent)       None                     Assessment and Plan        MRI Shoulder Right Without Contrast    Result Date: 6/29/2023  Narrative: PROCEDURE: MRI SHOULDER RIGHT WO CONTRAST  COMPARISON: [facility], CT, CT CHEST WO CONTRAST DIAGNOSTIC, 4/05/2022, 19:53.  [facility], CR, XR CHEST PA AND LATERAL, 5/18/2023, 11:35.  [facility], CT, CT CHEST WO CONTRAST DIAGNOSTIC, 6/15/2023, 10:02.  [facility] XR SHOULDER 2+ VW RIGHT, 5/18/2023, 11:37.  INDICATIONS: RIGHT SHOULDER PAIN WITH FROZEN SHOULDER, PATIENT WITH A HISTORY OF FEMUR FRACTURE 5-6 YEARS AGO      TECHNIQUE: A variety of imaging planes and parameters were utilized for visualization of suspected pathology.  Images were performed without contrast.   FINDINGS:  Evaluation is significantly limited due to patient positioning and motion artifact.  Due to chronic respiratory issues, patient was unable to tolerate positioning without labored breathing and pain which caused significant motion.  No significant full-thickness rotator cuff tendon defect is visualized on this exam.  There does appear to be some articular surface irregularity and thinning of the distal supraspinatus and infraspinatus tendons suspicious for broad partial thickness articular surface tear/fraying.  There also appears to be attenuation of the distal  subscapularis suggesting broad partial thickness articular surface tear.  The subscapularis is displaced secondary to proximal humeral morphology and osteophytes.  The teres minor tendon appears grossly intact.  There appears to be mild to moderate diffuse fatty muscle atrophy of the rotator cuff and deltoid muscles with approximately equal amounts of fat and muscle signal.  There does not appear to be significant fluid in the subacromial/subdeltoid bursa or subscapularis bursa on this exam.  The long head of the biceps tendon is visualized in the bicipital groove and intra-articular segment.  Small amount of fluid is present in the tendon sheath likely related to joint fluid.  Biceps tendinopathy and partial tear cannot be excluded.  Acromioclavicular alignment is within normal limits.  There appear to be mild to moderate degenerative changes at the acromioclavicular joint.  No significant periarticular edema is seen.  No axillary adenopathy.  As seen on prior radiographs there is impacted deformity of the proximal humeral neck and greater tuberosity suggesting remote fracture.  No definite acute humeral neck or tuberosity fracture is seen at this time.  There is suspected subchondral fracture of the posterior-medial humeral head.  In this location there appears to be heterogeneous subchondral edema signal and apparent fragmentation of the subchondral bone plate with fracture lines extending to the articular surface.  There is some T2 fluid signal in the subchondral marrow and multiple subchondral fractures with suspected unstable fragments with slight posterior displacement of a dominant fragment seen on the axial images.  The involved areas estimated to measure up to approximately 3 cm anterior to posterior and up to 2.8 cm superior to inferior.  This could be the sequelae of chronic avascular necrosis which could be associated with chronic lung disease.  There appears to be advanced chondromalacia of the humeral  head and glenoid with suspected high-grade partial and full-thickness cartilage loss.  There is a moderate joint effusion with suspected intra-articular fracture fragments and synovitis with heterogeneous signal in the axillary recess.  The labrum is not well value aided due to motion but there is suspected chronic degeneration and tear.        Impression:   1. Exam was significantly limited due to motion and patient positioning due to patient's underlying respiratory condition and discomfort. 2. Chronic proximal humerus fracture as seen on prior radiographs. 3. Findings of subchondral fracture at the posterior-medial humeral head with suspected unstable fracture fragments.  This could be the sequelae of chronic avascular necrosis. 4. Advanced glenohumeral osteoarthritis with moderate joint effusion and intra-articular bodies/synovitis. 5. Limited assessment of rotator cuff tendons with suspected broad partial thickness articular surface tears of the subscapularis, supraspinatus, and infraspinatus tendons.  No significant full-thickness defect is visualized on this exam.      OSVALDO BUENO MD       Electronically Signed and Approved By: OSVALDO BUENO MD on 6/29/2023 at 13:23               Diagnoses and all orders for this visit:    1. Primary osteoarthritis of right shoulder (Primary)    2. Closed fracture of proximal end of right humerus, unspecified fracture morphology, sequela    3. Tear of right rotator cuff, unspecified tear extent, unspecified whether traumatic         Discussed the treatment plan with the patient.  I reviewed the previous images. Home exercises given today. Discussed the risks and benefits of a right shoulder steroid injection.The patient expressed understanding and wished to proceed. He is on a blood thinner.     Call or return if worsening symptoms.    Scribed for Temo Aldrich MD by Joanne Shaw  07/24/2023   09:07 EDT         Follow Up       6 weeks    Patient was given  instructions and counseling regarding his condition or for health maintenance advice. Please see specific information pulled into the AVS if appropriate.       I have personally performed the services described in this document as scribed by the above individual and it is both accurate and complete.     Temo Aldrich MD  07/24/23  09:16 EDT

## 2023-07-25 ENCOUNTER — PATIENT OUTREACH (OUTPATIENT)
Dept: CASE MANAGEMENT | Facility: OTHER | Age: 58
End: 2023-07-25
Payer: COMMERCIAL

## 2023-07-25 DIAGNOSIS — G89.29 CHRONIC RIGHT SHOULDER PAIN: Primary | ICD-10-CM

## 2023-07-25 DIAGNOSIS — M25.511 CHRONIC RIGHT SHOULDER PAIN: Primary | ICD-10-CM

## 2023-07-25 NOTE — OUTREACH NOTE
"AMBULATORY CASE MANAGEMENT NOTE    Name and Relationship of Patient/Support Person: Preston Wallis \"Gómez\" - Self    Following up from orthopedic appointment this week.  Not surgical candidate, injected steroid to help relieve pain.  Considering PT - requesting home health, but will let me know decision at a later date.     Education Documentation  No documentation found.        Tara GOMEZ  Ambulatory Case Management    7/25/2023, 15:56 EDT  "

## 2023-07-26 ENCOUNTER — PATIENT OUTREACH (OUTPATIENT)
Dept: CASE MANAGEMENT | Facility: OTHER | Age: 58
End: 2023-07-26
Payer: COMMERCIAL

## 2023-07-26 DIAGNOSIS — M25.511 CHRONIC RIGHT SHOULDER PAIN: Primary | ICD-10-CM

## 2023-07-26 DIAGNOSIS — G89.29 CHRONIC RIGHT SHOULDER PAIN: Primary | ICD-10-CM

## 2023-07-26 DIAGNOSIS — M19.011 PRIMARY OSTEOARTHRITIS OF RIGHT SHOULDER: Primary | ICD-10-CM

## 2023-07-26 NOTE — OUTREACH NOTE
"AMBULATORY CASE MANAGEMENT NOTE    Name and Relationship of Patient/Support Person: Preston Wallis \"Gómez\" - Self    Kami brought medications in today.  Reviewed with family and filled planner for 5 weeks.  Outreach created for follow up reminder for refills and upcoming appointments.      Elizabeth also reports that Gómez is agreeable to home health for his shoulder. Sent message to provider, order placed and referral to fax.      Education Documentation  No documentation found.        Tara GOMEZ  Ambulatory Case Management    7/26/2023, 15:33 EDT  "

## 2023-07-27 ENCOUNTER — TELEPHONE (OUTPATIENT)
Dept: FAMILY MEDICINE CLINIC | Age: 58
End: 2023-07-27

## 2023-07-27 DIAGNOSIS — R11.0 NAUSEA: Primary | ICD-10-CM

## 2023-07-27 RX ORDER — ONDANSETRON 4 MG/1
4 TABLET, ORALLY DISINTEGRATING ORAL EVERY 8 HOURS PRN
Qty: 10 TABLET | Refills: 0 | Status: SHIPPED | OUTPATIENT
Start: 2023-07-27

## 2023-07-27 NOTE — TELEPHONE ENCOUNTER
Caller: Kami Wallis    Relationship: Emergency Contact    Best call back number: [phone]    What medication are you requesting: PHENEGREN OR SIMILAR    What are your current symptoms: N/A    How long have you been experiencing symptoms: N/A    Have you had these symptoms before:    [x] Yes  [] No    Have you been treated for these symptoms before:   [x] Yes  [] No    If a prescription is needed, what is your preferred pharmacy and phone number: [facility] [address] [phone] [facility] [phone]      Additional notes:PATIENT'S WIFE STATED HE IS HAVING DIARRHEA AND VOMITING. PLEASE SEND NEW PRESCRIPTION TO PHARMACY ASAP.

## 2023-07-27 NOTE — TELEPHONE ENCOUNTER
Past couple of day not feeling well, had a little vomiting this am with phlegm.  Got steroid injection in shoulder and has not been feeling well since.  BS was 77 while on the phone, he ate a honey bun.  2 days of diarrhea.  No new antibiotic.  She would like some Zofran sent into the pharmacy if possible.

## 2023-07-31 ENCOUNTER — PATIENT OUTREACH (OUTPATIENT)
Dept: CASE MANAGEMENT | Facility: OTHER | Age: 58
End: 2023-07-31
Payer: COMMERCIAL

## 2023-07-31 DIAGNOSIS — G89.29 CHRONIC RIGHT SHOULDER PAIN: Primary | ICD-10-CM

## 2023-07-31 DIAGNOSIS — E11.65 UNCONTROLLED TYPE 2 DIABETES MELLITUS WITH HYPERGLYCEMIA, WITH LONG-TERM CURRENT USE OF INSULIN: ICD-10-CM

## 2023-07-31 DIAGNOSIS — Z79.4 UNCONTROLLED TYPE 2 DIABETES MELLITUS WITH HYPERGLYCEMIA, WITH LONG-TERM CURRENT USE OF INSULIN: ICD-10-CM

## 2023-07-31 DIAGNOSIS — I50.32 CHRONIC DIASTOLIC (CONGESTIVE) HEART FAILURE: ICD-10-CM

## 2023-07-31 DIAGNOSIS — M25.511 CHRONIC RIGHT SHOULDER PAIN: Primary | ICD-10-CM

## 2023-07-31 DIAGNOSIS — N18.31 STAGE 3A CHRONIC KIDNEY DISEASE: ICD-10-CM

## 2023-08-01 DIAGNOSIS — D50.8 IRON DEFICIENCY ANEMIA SECONDARY TO INADEQUATE DIETARY IRON INTAKE: ICD-10-CM

## 2023-08-01 RX ORDER — DOXYCYCLINE HYCLATE 50 MG/1
CAPSULE, GELATIN COATED ORAL
Qty: 90 TABLET | Refills: 1 | Status: SHIPPED | OUTPATIENT
Start: 2023-08-01

## 2023-08-02 ENCOUNTER — TELEPHONE (OUTPATIENT)
Dept: FAMILY MEDICINE CLINIC | Age: 58
End: 2023-08-02
Payer: COMMERCIAL

## 2023-08-02 DIAGNOSIS — J47.9 BRONCHIECTASIS WITHOUT COMPLICATION: Primary | ICD-10-CM

## 2023-08-07 ENCOUNTER — PRIOR AUTHORIZATION (OUTPATIENT)
Dept: FAMILY MEDICINE CLINIC | Age: 58
End: 2023-08-07
Payer: COMMERCIAL

## 2023-08-07 DIAGNOSIS — M54.41 CHRONIC BILATERAL LOW BACK PAIN WITH BILATERAL SCIATICA: ICD-10-CM

## 2023-08-07 DIAGNOSIS — M54.42 CHRONIC BILATERAL LOW BACK PAIN WITH BILATERAL SCIATICA: ICD-10-CM

## 2023-08-07 DIAGNOSIS — G89.29 CHRONIC BILATERAL LOW BACK PAIN WITH BILATERAL SCIATICA: ICD-10-CM

## 2023-08-07 DIAGNOSIS — G62.9 PERIPHERAL POLYNEUROPATHY: ICD-10-CM

## 2023-08-07 RX ORDER — GABAPENTIN 300 MG/1
CAPSULE ORAL
Qty: 90 CAPSULE | Refills: 0 | Status: SHIPPED | OUTPATIENT
Start: 2023-08-07

## 2023-08-07 NOTE — TELEPHONE ENCOUNTER
Message from plan -   This drug/product is not covered under the pharmacy benefit. Prior Authorization is not available.    Spoke to Man hernandez [facility] - rx went through with a different NDC.

## 2023-08-09 ENCOUNTER — PATIENT OUTREACH (OUTPATIENT)
Dept: CASE MANAGEMENT | Facility: OTHER | Age: 58
End: 2023-08-09
Payer: COMMERCIAL

## 2023-08-09 DIAGNOSIS — Z79.899 LONG-TERM USE OF HIGH-RISK MEDICATION: Primary | ICD-10-CM

## 2023-08-09 NOTE — OUTREACH NOTE
AMBULATORY CASE MANAGEMENT NOTE    Name and Relationship of Patient/Support Person: [facility] at the [facility] - [place], [place] - [facility] [address] [facility] [phone] [facility] [phone] [facility] - Pharmacy    Called and spoke with Stan at [facility] at the [facility].  Attempting to sync meds.  Discussed and he will need to sit down and create specific amounts for us to send in.  He will get back in touch with us in a few days.     Fanny Farmer is seeing patient tomorrow for port flu, will contact PCP to see what labs she may want to get in advance of his appointment this month.     Education Documentation  No documentation found.        Tara GOMEZ  Ambulatory Case Management    8/9/2023, 09:51 EDT

## 2023-08-10 ENCOUNTER — LAB REQUISITION (OUTPATIENT)
Dept: LAB | Facility: HOSPITAL | Age: 58
End: 2023-08-10
Payer: COMMERCIAL

## 2023-08-10 DIAGNOSIS — R05.9 COUGH, UNSPECIFIED: ICD-10-CM

## 2023-08-10 DIAGNOSIS — J44.9 CHRONIC OBSTRUCTIVE PULMONARY DISEASE, UNSPECIFIED: ICD-10-CM

## 2023-08-10 DIAGNOSIS — K21.9 GASTROESOPHAGEAL REFLUX DISEASE WITHOUT ESOPHAGITIS: ICD-10-CM

## 2023-08-10 DIAGNOSIS — I50.32 CHRONIC DIASTOLIC (CONGESTIVE) HEART FAILURE: ICD-10-CM

## 2023-08-10 DIAGNOSIS — I50.9 HEART FAILURE, UNSPECIFIED: ICD-10-CM

## 2023-08-10 DIAGNOSIS — I13.0 HYPERTENSIVE HEART AND CHRONIC KIDNEY DISEASE WITH HEART FAILURE AND STAGE 1 THROUGH STAGE 4 CHRONIC KIDNEY DISEASE, OR UNSPECIFIED CHRONIC KIDNEY DISEASE: ICD-10-CM

## 2023-08-10 DIAGNOSIS — I48.0 PAROXYSMAL ATRIAL FIBRILLATION: ICD-10-CM

## 2023-08-10 LAB
ANION GAP SERPL CALCULATED.3IONS-SCNC: 7.8 MMOL/L (ref 5–15)
BASOPHILS # BLD AUTO: 0.02 10*3/MM3 (ref 0–0.2)
BASOPHILS NFR BLD AUTO: 0.2 % (ref 0–1.5)
BUN SERPL-MCNC: 24 MG/DL (ref 6–20)
BUN/CREAT SERPL: 15.3 (ref 7–25)
CALCIUM SPEC-SCNC: 9.2 MG/DL (ref 8.6–10.5)
CHLORIDE SERPL-SCNC: 97 MMOL/L (ref 98–107)
CO2 SERPL-SCNC: 36.2 MMOL/L (ref 22–29)
CREAT SERPL-MCNC: 1.57 MG/DL (ref 0.76–1.27)
DEPRECATED RDW RBC AUTO: 44.3 FL (ref 37–54)
EGFRCR SERPLBLD CKD-EPI 2021: 50.8 ML/MIN/1.73
EOSINOPHIL # BLD AUTO: 0.33 10*3/MM3 (ref 0–0.4)
EOSINOPHIL NFR BLD AUTO: 2.8 % (ref 0.3–6.2)
ERYTHROCYTE [DISTWIDTH] IN BLOOD BY AUTOMATED COUNT: 13.1 % (ref 12.3–15.4)
GLUCOSE SERPL-MCNC: 124 MG/DL (ref 65–99)
HCT VFR BLD AUTO: 37.7 % (ref 37.5–51)
HGB BLD-MCNC: 11.4 G/DL (ref 13–17.7)
IMM GRANULOCYTES # BLD AUTO: 0.02 10*3/MM3 (ref 0–0.05)
IMM GRANULOCYTES NFR BLD AUTO: 0.2 % (ref 0–0.5)
LYMPHOCYTES # BLD AUTO: 2.51 10*3/MM3 (ref 0.7–3.1)
LYMPHOCYTES NFR BLD AUTO: 21 % (ref 19.6–45.3)
MCH RBC QN AUTO: 27.4 PG (ref 26.6–33)
MCHC RBC AUTO-ENTMCNC: 30.2 G/DL (ref 31.5–35.7)
MCV RBC AUTO: 90.6 FL (ref 79–97)
MONOCYTES # BLD AUTO: 0.94 10*3/MM3 (ref 0.1–0.9)
MONOCYTES NFR BLD AUTO: 7.8 % (ref 5–12)
NEUTROPHILS NFR BLD AUTO: 68 % (ref 42.7–76)
NEUTROPHILS NFR BLD AUTO: 8.16 10*3/MM3 (ref 1.7–7)
PLATELET # BLD AUTO: 429 10*3/MM3 (ref 140–450)
PMV BLD AUTO: 8.8 FL (ref 6–12)
POTASSIUM SERPL-SCNC: 4.5 MMOL/L (ref 3.5–5.2)
RBC # BLD AUTO: 4.16 10*6/MM3 (ref 4.14–5.8)
SODIUM SERPL-SCNC: 141 MMOL/L (ref 136–145)
WBC NRBC COR # BLD: 11.98 10*3/MM3 (ref 3.4–10.8)

## 2023-08-10 PROCEDURE — 80048 BASIC METABOLIC PNL TOTAL CA: CPT | Performed by: FAMILY MEDICINE

## 2023-08-10 PROCEDURE — 85025 COMPLETE CBC W/AUTO DIFF WBC: CPT | Performed by: FAMILY MEDICINE

## 2023-08-10 RX ORDER — FAMOTIDINE 40 MG/1
40 TABLET, FILM COATED ORAL EVERY MORNING
Qty: 90 TABLET | Refills: 2 | Status: SHIPPED | OUTPATIENT
Start: 2023-08-10

## 2023-08-10 RX ORDER — APIXABAN 5 MG/1
TABLET, FILM COATED ORAL
Qty: 180 TABLET | Refills: 2 | Status: SHIPPED | OUTPATIENT
Start: 2023-08-10

## 2023-08-18 DIAGNOSIS — E55.9 VITAMIN D DEFICIENCY: ICD-10-CM

## 2023-08-18 RX ORDER — CHOLECALCIFEROL (VITAMIN D3) 125 MCG
CAPSULE ORAL
Qty: 90 TABLET | Refills: 1 | Status: SHIPPED | OUTPATIENT
Start: 2023-08-18

## 2023-08-21 ENCOUNTER — OFFICE VISIT (OUTPATIENT)
Dept: UROLOGY | Facility: CLINIC | Age: 58
End: 2023-08-21
Payer: COMMERCIAL

## 2023-08-21 VITALS
HEIGHT: 69 IN | TEMPERATURE: 97.2 F | SYSTOLIC BLOOD PRESSURE: 124 MMHG | DIASTOLIC BLOOD PRESSURE: 59 MMHG | HEART RATE: 72 BPM | WEIGHT: 285 LBS | BODY MASS INDEX: 42.21 KG/M2

## 2023-08-21 DIAGNOSIS — R33.9 URINARY RETENTION: Primary | ICD-10-CM

## 2023-08-21 DIAGNOSIS — Z78.9 INTERMITTENT SELF-CATHETERIZATION OF BLADDER: ICD-10-CM

## 2023-08-21 DIAGNOSIS — N31.2 NEUROGENIC BLADDER, FLACCID: ICD-10-CM

## 2023-08-21 LAB
BILIRUB BLD-MCNC: NEGATIVE MG/DL
CLARITY, POC: CLEAR
COLOR UR: YELLOW
EXPIRATION DATE: ABNORMAL
GLUCOSE UR STRIP-MCNC: ABNORMAL MG/DL
KETONES UR QL: NEGATIVE
LEUKOCYTE EST, POC: NEGATIVE
Lab: ABNORMAL
NITRITE UR-MCNC: NEGATIVE MG/ML
PH UR: 6.5 [PH] (ref 5–8)
PROT UR STRIP-MCNC: ABNORMAL MG/DL
RBC # UR STRIP: ABNORMAL /UL
SP GR UR: 1.02 (ref 1–1.03)
UROBILINOGEN UR QL: ABNORMAL

## 2023-08-21 NOTE — PROGRESS NOTES
"Chief Complaint  Urinary Retention (6 MONTH F/U)    Neurogenic bladder    Subjective patient has nasal oxygen going        Preston Wallis presents to [facility]  History of Present Illness    58-year-old white male has diabetes mellitus and neurogenic bladder check bladder.  Patient is pretty weak and cannot move around.  Wife does intermittent catheterization on the patient.  Sometimes patient can urinate on his own but his residual is running about 1000 cc.  Patient has no fever or chills    Objective no acute distress  Vital Signs:   /59 (BP Location: Left arm, Patient Position: Sitting, Cuff Size: Adult)   Pulse 72   Temp 97.2 øF (36.2 øC) (Temporal)   Ht 175.3 cm (69.02\")   Wt 129 kg (285 lb)   BMI 42.07 kg/mý     Allergies   Allergen Reactions    Benadryl [Diphenhydramine] Itching    Proventil [Albuterol] Other (See Comments)     Mouth sores        Past medical history:  has a past medical history of Age-related cognitive decline, Allergic contact dermatitis, Allergies, Anemia, Bronchiectasis with acute lower respiratory infection, Charcot foot due to diabetes mellitus (9/10/2013), Chronic diastolic (congestive) heart failure, Chronic kidney disease, Chronic respiratory failure with hypoxia, Closed supracondylar fracture of femur (1/12/2022), COPD (chronic obstructive pulmonary disease), Deep vein thrombosis (DVT) of lower extremity associated with air travel (1/13/2023), Dependence on supplemental oxygen, Eczema, Erectile dysfunction, Essential (primary) hypertension, Fracture, Fracture of proximal humerus (1/13/2023), GERD without esophagitis, High risk medication use, Hypercholesteremia, Hypomagnesemia, Infected stasis ulcer of left lower extremity (1/13/2023), Insomnia, Low back pain, Major depressive disorder, Morbid (severe) obesity due to excess calories, MRSA pneumonia, Muscle weakness, 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 " without evidence of necrosis, Obstructive sleep apnea (adult) (pediatric), Other forms of dyspnea, Other long term (current) drug therapy, Other specified noninfective gastroenteritis and colitis, Other spondylosis, lumbar region, Pain in both knees, Paroxysmal atrial fibrillation, Peripheral neuropathy, Pneumonia, unspecified organism, Polyneuropathy, Rash and other nonspecific skin eruption, Syncope and collapse, Tachycardia, Tinnitus (1/13/2023), Type 1 diabetes mellitus with diabetic chronic kidney disease, Type 2 diabetes mellitus, Unspecified fall, initial encounter, and Urinary retention.   Past surgical history:  has a past surgical history that includes Cholecystectomy; tonsillectomy and adenoidectomy; Knee surgery (Left); Other surgical history (Left); Cystoscopy; and Femur Surgery (Left).  Personal history: family history includes Asthma in his father; Cancer in his sister; Coronary artery disease in his mother; Diabetes type II in his mother and sister; Hypertension in his mother.  Social history:  reports that he quit smoking about 30 years ago. His smoking use included cigarettes. He started smoking about 42 years ago. He has a 12.00 pack-year smoking history. He has never used smokeless tobacco. He reports that he does not currently use alcohol. He reports that he does not use drugs.    Review of Systems    Please see past medical and surgical history rest of the system is negative    Physical Exam  Constitutional:       General: He is not in acute distress.     Appearance: He is obese. He is not ill-appearing or toxic-appearing.      Comments: Patient is very weak and is on a wheelchair.  Has nasal oxygen going   HENT:      Head: Normocephalic and atraumatic.      Ears:      Comments: No loss of hearing  Abdominal:      Palpations: There is no mass.      Tenderness: There is no abdominal tenderness. There is no right CVA tenderness or left CVA tenderness.   Genitourinary:     Penis: Normal.        Testes: Normal.      Comments: Scrotum is normal  Neurological:      General: No focal deficit present.      Mental Status: He is alert and oriented to person, place, and time.      Motor: Weakness present.      Gait: Gait abnormal.   Psychiatric:         Mood and Affect: Mood normal.         Behavior: Behavior normal.         Thought Content: Thought content normal.         Judgment: Judgment normal.      Result Review :                 Assessment and Plan    Diagnoses and all orders for this visit:    1. Urinary retention (Primary)  -     POC Urinalysis Dipstick, Automated    2. Neurogenic bladder, flaccid    3. Intermittent self-catheterization of bladder      I have asked the patient and the wife to make sure to get catheterize every 4-6 hours do not let his bladder distended to 1000 cc.  There is no evidence of urine tract infection at this time.  We will continue intermittent catheterization and recheck him in 6 months time.  Brief Urine Lab Results  (Last result in the past 365 days)        Color   Clarity   Blood   Leuk Est   Nitrite   Protein   CREAT   Urine HCG        08/21/23 1047 Yellow   Clear   Trace   Negative   Negative   100 mg/dL                    Follow Up   No follow-ups on file.  Patient was given instructions and counseling regarding his condition or for health maintenance advice. Please see specific information pulled into the AVS if appropriate.     Teresita White MD

## 2023-08-22 ENCOUNTER — OFFICE VISIT (OUTPATIENT)
Dept: FAMILY MEDICINE CLINIC | Age: 58
End: 2023-08-22
Payer: COMMERCIAL

## 2023-08-22 VITALS
SYSTOLIC BLOOD PRESSURE: 110 MMHG | OXYGEN SATURATION: 95 % | WEIGHT: 278 LBS | BODY MASS INDEX: 41.18 KG/M2 | HEART RATE: 76 BPM | DIASTOLIC BLOOD PRESSURE: 62 MMHG | HEIGHT: 69 IN

## 2023-08-22 DIAGNOSIS — K21.9 GASTROESOPHAGEAL REFLUX DISEASE WITHOUT ESOPHAGITIS: ICD-10-CM

## 2023-08-22 DIAGNOSIS — M25.511 CHRONIC RIGHT SHOULDER PAIN: ICD-10-CM

## 2023-08-22 DIAGNOSIS — Z79.899 HIGH RISK MEDICATION USE: ICD-10-CM

## 2023-08-22 DIAGNOSIS — M54.41 CHRONIC BILATERAL LOW BACK PAIN WITH BILATERAL SCIATICA: ICD-10-CM

## 2023-08-22 DIAGNOSIS — N18.32 STAGE 3B CHRONIC KIDNEY DISEASE (CKD): ICD-10-CM

## 2023-08-22 DIAGNOSIS — M17.12 PRIMARY OSTEOARTHRITIS OF LEFT KNEE: ICD-10-CM

## 2023-08-22 DIAGNOSIS — E55.9 VITAMIN D DEFICIENCY: ICD-10-CM
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
## 2023-08-22 DIAGNOSIS — R25.1 TREMOR: ICD-10-CM

## 2023-08-22 DIAGNOSIS — G89.29 CHRONIC RIGHT SHOULDER PAIN: ICD-10-CM

## 2023-08-22 DIAGNOSIS — N40.1 BENIGN PROSTATIC HYPERPLASIA WITH URINARY RETENTION: ICD-10-CM

## 2023-08-22 DIAGNOSIS — D50.8 IRON DEFICIENCY ANEMIA SECONDARY TO INADEQUATE DIETARY IRON INTAKE: ICD-10-CM

## 2023-08-22 DIAGNOSIS — R33.8 BENIGN PROSTATIC HYPERPLASIA WITH URINARY RETENTION: ICD-10-CM

## 2023-08-22 DIAGNOSIS — I10 ESSENTIAL (PRIMARY) HYPERTENSION: ICD-10-CM

## 2023-08-22 DIAGNOSIS — R05.8 PRODUCTIVE COUGH: ICD-10-CM

## 2023-08-22 DIAGNOSIS — M54.42 CHRONIC BILATERAL LOW BACK PAIN WITH BILATERAL SCIATICA: ICD-10-CM

## 2023-08-22 DIAGNOSIS — E11.40 TYPE 2 DIABETES MELLITUS WITH DIABETIC NEUROPATHY, WITH LONG-TERM CURRENT USE OF INSULIN: ICD-10-CM

## 2023-08-22 DIAGNOSIS — G89.29 CHRONIC BILATERAL LOW BACK PAIN WITH BILATERAL SCIATICA: ICD-10-CM

## 2023-08-22 DIAGNOSIS — R44.3 HALLUCINATION: ICD-10-CM

## 2023-08-22 DIAGNOSIS — Z79.4 TYPE 2 DIABETES MELLITUS WITH DIABETIC NEUROPATHY, WITH LONG-TERM CURRENT USE OF INSULIN: ICD-10-CM

## 2023-08-22 DIAGNOSIS — F32.4 MAJOR DEPRESSIVE DISORDER WITH SINGLE EPISODE, IN PARTIAL REMISSION: ICD-10-CM

## 2023-08-22 DIAGNOSIS — N25.81 HYPERPARATHYROIDISM DUE TO RENAL INSUFFICIENCY: ICD-10-CM

## 2023-08-22 DIAGNOSIS — I48.0 PAROXYSMAL ATRIAL FIBRILLATION: Primary | ICD-10-CM

## 2023-08-22 LAB
EXPIRATION DATE: NORMAL
HBA1C MFR BLD: 8 %
Lab: NORMAL

## 2023-08-22 PROCEDURE — 83036 HEMOGLOBIN GLYCOSYLATED A1C: CPT | Performed by: FAMILY MEDICINE

## 2023-08-22 PROCEDURE — 3052F HG A1C>EQUAL 8.0%<EQUAL 9.0%: CPT | Performed by: FAMILY MEDICINE

## 2023-08-22 PROCEDURE — 99214 OFFICE O/P EST MOD 30 MIN: CPT | Performed by: FAMILY MEDICINE

## 2023-08-22 PROCEDURE — 87070 CULTURE OTHR SPECIMN AEROBIC: CPT | Performed by: FAMILY MEDICINE

## 2023-08-22 PROCEDURE — 3074F SYST BP LT 130 MM HG: CPT | Performed by: FAMILY MEDICINE

## 2023-08-22 PROCEDURE — 3078F DIAST BP <80 MM HG: CPT | Performed by: FAMILY MEDICINE

## 2023-08-22 PROCEDURE — 87205 SMEAR GRAM STAIN: CPT | Performed by: FAMILY MEDICINE

## 2023-08-22 NOTE — PROGRESS NOTES
Preston Wallis presents to [facility].    Chief Complaint:  diabetes follow up    Subjective       History of Present Illness:  Diabetes  Pertinent negatives for hypoglycemia include no dizziness or nervousness/anxiousness. Associated symptoms include fatigue and weakness. Pertinent negatives for diabetes include no chest pain.      Time he is being seen today for his routine follow-up.  He is seeing Neli Paredes for his diabetes with chronic diabetic kidney disease, neurogenic bladder requiring catheterization and peripheral neuropathy.  He states his blood sugars have been consistently running less than 200 and occasionally he has low blood sugars in the 60-70 range.  Hemoglobin A1c performed today showed that he is improved and down from 8.9% to 8%.  He is to follow-up with his endocrinologist and continue his current medication and low-carb diet.  He has lost 7 pounds in the past couple months.  Tobacco screen: Non-smoker/Past smoker.  Current meds include insulin/injectable ( SSI, lantus 40 units nightly ), bydureon and farxiga.  He tolerates medication well.  He now has a continuous monitor for blood sugars so he is able to follow his blood sugars closely.  No signs of infection today but he does have thick sputum and I will send this in for sputum culture with his history of multiple upper respiratory infections.  He actually feels okay today. Foot exam is up-to-date and done last office visit.  He still is overdue for his eye exam.  Last eye exam was 4/2020. -He knows he needs follow-up for his eyes and he defers.  Gómez now has a diabetic bladder and will chronically need to be cathed.  He is tolerating the procedure well.  He sees urology Dr White he presents with    Ronnie is hallucinating more, he one day was yelling at his grandson who he thought was in the room and he wasn't there, seeing people in the house who are not there, thought he had a bag of chips in the doctors  office and he never had a bag of chips, this is the first time his wife has ever told me about this, this onset a few months ago and is getting worse. He does have parathyroid disease. He has an appt in October to see ENT.  And that I will recheck a PTH.  I am very concerned about him being more hyper parathyroid and this is causing his hallucinations.    And his face went to the corner and he slid down the wall.  He states he has had no injuries and is not in any pain.  He did not feel like he hit his head hard and has no teeth issues.  Denies headache or dizziness.       Gómez has congestive heart failure with preserved ejection fraction, diastolic dysfunction (in addition he has chronic lower extremity edema).  He continues to be stable on current meds.  He is on bumex.  He has improved with his medicine due to our care coordinator nurse Sima.  He is to avoid salt in his diet.    Denies CP/LE edema is mild     He has chronic iron deficiency anemia and sees hematology Dr. Hanson.  He had 2 iron infusions in past, tolerates ferrous gluconate, on stool softener for constipation that is stable and well controlled.  Colonoscopy and EGD are UTD, showed 1 8 mm polyp and gastritis, done by Dr Engel     He presents with essential (primary) hypertension, current cardiac medication regimen includes a diuretic (bumex ), a beta-blocker ( Metoprolol 100 mg twice daily ), ARB (losartan), IMDUR, CCB (procardia).  Compliance has been good with pill packs.  He is tolerating the medication well without side effects.  He has not kept a blood pressure diary.  Blood pressure today is low, he  denies dizziness.  I will decrease his metoprolol to 50 mg twice daily     He has h/o hospitalization for pseudomonas pneumonia in past, and he gets recurrent infections and sees Dr Chavez and is on intermittent tobramycin nebulizer.  He is also on albuterol/duo nebs and symbicort daily, albuterol nebulizers. He thinks he needs antibiotic  for pneumonia, he feels bad, he is cold, he has fatigue with thick green productive cough.  No covid exposure     He has h/o PE and is on eliquis and is stable on medication.        H/o afib and is in NSR, on BB and eliquis, Cardiologist is Dr. Ware, he has denies chest pain or heart palpitations today.     He has chronic PN pain and RLS at night, he is overall stable and doing well on the gabapentin.       Chronic L knee pain, his pain is severe, he cant stand or balance due to pain, he has an ortho appt with Arti oropeza for knee replacement.     He is on positive pressure CPAP for obstructive sleep apnea at home and he is mostly compliant and tolerates well, recommend he continue.    He is status post a fall today in our office.  While he was using the restroom he lost his balance     Review of Systems:  Review of Systems   Constitutional:  Positive for fatigue. Negative for chills and fever.   HENT:  Negative for congestion, ear discharge and sore throat.    Respiratory:  Positive for shortness of breath and wheezing.    Cardiovascular:  Negative for chest pain, palpitations and leg swelling.   Gastrointestinal:  Positive for constipation. Negative for abdominal pain, diarrhea, nausea, vomiting and GERD.   Genitourinary:  Negative for flank pain.   Neurological:  Positive for weakness. Negative for dizziness and headache.   Psychiatric/Behavioral:  Negative for sleep disturbance, suicidal ideas and depressed mood. The patient is not nervous/anxious.       Objective   Medical History:  Past Medical History:    Age-related cognitive decline    Allergic contact dermatitis    Allergies    Anemia    Bronchiectasis with acute lower respiratory infection    Charcot foot due to diabetes mellitus    Chronic diastolic (congestive) heart failure    Chronic kidney disease    Chronic respiratory failure with hypoxia    Closed supracondylar fracture of femur    COPD (chronic obstructive pulmonary disease)    Deep vein  thrombosis (DVT) of lower extremity associated with air travel    Dependence on supplemental oxygen    Eczema    Erectile dysfunction    due to organic reasons    Essential (primary) hypertension    Fracture    closed fracture of other tarsal and metatarsal bones    Fracture of proximal humerus    GERD without esophagitis    High risk medication use    Hypercholesteremia    Hypomagnesemia    Infected stasis ulcer of left lower extremity    Insomnia    Low back pain    Major depressive disorder    Morbid (severe) obesity due to excess calories    MRSA pneumonia    Muscle weakness    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 without evidence of necrosis    Obstructive sleep apnea (adult) (pediatric)    Other forms of dyspnea    Other long term (current) drug therapy    Other specified noninfective gastroenteritis and colitis    Other spondylosis, lumbar region    Pain in both knees    Paroxysmal atrial fibrillation    Peripheral neuropathy    attributed to type 2 diabetes    Pneumonia, unspecified organism    Polyneuropathy    Rash and other nonspecific skin eruption    Syncope and collapse    Tachycardia    Tinnitus    Type 1 diabetes mellitus with diabetic chronic kidney disease    Type 2 diabetes mellitus    Unspecified fall, initial encounter    Urinary retention     Past Surgical History:    CHOLECYSTECTOMY    CYSTOSCOPY    FEMUR SURGERY    Shravan placed    KNEE SURGERY    OTHER SURGICAL HISTORY    venous port    TONSILLECTOMY AND ADENOIDECTOMY      Family History   Problem Relation Age of Onset    Coronary artery disease Mother     Hypertension Mother     Diabetes type II Mother     Asthma Father     Diabetes type II Sister     Cancer Sister      Social History     Tobacco Use    Smoking status: Former     Packs/day: 1.00     Years: 12.00     Pack years: 12.00     Types: Cigarettes     Start date:      Quit date:      Years since quittin.6    Smokeless tobacco:  Never   Substance Use Topics    Alcohol use: Not Currently       Health Maintenance Due   Topic Date Due    LIPID PANEL  03/17/2023        Immunization History   Administered Date(s) Administered    Flu Vaccine Quad PF >36MO 10/18/2016, 10/16/2017, 11/04/2019    Flu Vaccine Split Quad 11/04/2019    Fluzone >6mos 10/18/2016, 10/16/2017, 11/04/2019, 10/19/2022    Influenza Injectable Mdck Pf Quad 10/19/2022    Influenza Quad Vaccine (Inpatient) 09/19/2013    Influenza, Unspecified 09/21/2020    Pneumococcal Polysaccharide (PPSV23) 11/20/1997    Tdap 09/18/2018       Allergies   Allergen Reactions    Benadryl [Diphenhydramine] Itching    Proventil [Albuterol] Other (See Comments)     Mouth sores          Medications:  Current Outpatient Medications on File Prior to Visit   Medication Sig    atorvastatin (LIPITOR) 20 MG tablet Take 1 tablet by mouth Every Night.    Budeson-Glycopyrrol-Formoterol (Breztri Aerosphere) 160-9-4.8 MCG/ACT aerosol inhaler     bumetanide (BUMEX) 2 MG tablet Take 1 tablet BY MOUTH TWICE DAILY. call cardiology IF weight is greater THAN 2 pounds in 1 DAY OR 5 pounds in A WEEK.    calcium citrate (CALCITRATE) 950 (200 Ca) MG tablet TAKE 1 TABLET BY MOUTH ONCE DAILY    Cholecalciferol (Vitamin D3) 50 MCG (2000 UT) tablet TAKE 1 TABLET BY MOUTH ONCE DAILY    Continuous Blood Gluc  (FreeStyle Bethany 2 [place]) device     Continuous Blood Gluc Sensor (FreeStyle Bethany 2 Sensor) misc 1 each Every 14 (Fourteen) Days.    Diclofenac Sodium (VOLTAREN) 1 % gel gel Apply  topically to the appropriate area as directed 4 (Four) Times a Day.    docusate sodium (COLACE) 100 MG capsule TAKE ONE CAPSULE BY MOUTH TWICE DAILY    DULoxetine (CYMBALTA) 60 MG capsule Take 1 capsule by mouth 2 (Two) Times a Day.    Eliquis 5 MG tablet tablet Take 1 tablet BY MOUTH EVERY 12 hours    exenatide er (Bydureon BCise) 2 MG/0.85ML auto-injector injection Inject 0.85 mL under the skin into the appropriate area as directed 1  (One) Time Per Week for 168 days.    famotidine (PEPCID) 40 MG tablet Take 1 tablet BY MOUTH EVERY MORNING    Farxiga 5 MG tablet tablet TAKE 1 TABLET BY MOUTH EVERY DAY    ferrous gluconate (FERGON) 324 MG tablet TAKE 1 TABLET BY MOUTH ONCE DAILY with breakfast    finasteride (PROSCAR) 5 MG tablet Take 1 tablet by mouth Daily.    folic acid (FOLVITE) 1 MG tablet Take 1 tablet by mouth Daily.    gabapentin (NEURONTIN) 300 MG capsule TAKE ONE CAPSULE BY MOUTH EVERY MORNING AND TAKE TWO CAPSULES BY MOUTH EVERY NIGHT AT BEDTIME    glucose blood test strip 1 each by Other route Daily. Dx:   E11.40    Insulin Lispro, 1 Unit Dial, (HUMALOG) 100 UNIT/ML solution pen-injector inject EIGHT units UNDER THE SKIN INTO THE APPROPRIATE AREA THREE TIMES DAILY PER sliding scale, IF BLOOD SUGAR < 160= 0 units, 161-220= 2 units, 221-280= 4 units, 281-340 = SIX units, 341-400 = EIGHT units    isosorbide mononitrate (IMDUR) 30 MG 24 hr tablet     Lantus SoloStar 100 UNIT/ML injection pen INJECT 40 UNITS UNDER THE SKIN ONCE DAILY    losartan (COZAAR) 25 MG tablet Take 1 tablet by mouth Every Morning.    Melatonin-Magnesium Citrate 1-71.5 MG tablet Take 1 tablet by mouth Every Night.    metoprolol tartrate (LOPRESSOR) 100 MG tablet Take 1 tablet by mouth 2 (Two) Times a Day.    montelukast (SINGULAIR) 10 MG tablet TAKE 1 TABLET BY MOUTH EVERY NIGHT AT BEDTIME    NIFEdipine XL (PROCARDIA XL) 30 MG 24 hr tablet Take 1 tablet by mouth Every Morning.    O2 (OXYGEN) 2 Liter O2 - CONTINUOUS (route: Oxygen)    ondansetron ODT (ZOFRAN-ODT) 4 MG disintegrating tablet Place 1 tablet on the tongue Every 8 (Eight) Hours As Needed for Nausea or Vomiting.    sodium chloride 3 % nebulizer solution TAKE 4 MLS BY NEBULIZER TWICE DAILY    traMADol (ULTRAM) 50 MG tablet Take 1 tablet by mouth Every 8 (Eight) Hours As Needed for Moderate Pain.    TRUEplus Lancets 28G misc USE AS DIRECTED    Ventolin  (90 Base) MCG/ACT inhaler INHALE 2 PUFFS FOUR  "TIMES DAILY AS NEEDED FOR WHEEZING    ipratropium-albuterol (DUO-NEB) 0.5-2.5 mg/3 ml nebulizer Take 3 mL by nebulization 4 (Four) Times a Day As Needed for Wheezing or Shortness of Air for up to 30 days.     No current facility-administered medications on file prior to visit.       Vital Signs:   /62 (BP Location: Right arm, Patient Position: Sitting)   Pulse 76   Ht 175.3 cm (69.02\")   Wt 126 kg (278 lb)   SpO2 95% Comment: 2 liters O2  BMI 41.03 kg/mý       Physical Exam:  Physical Exam  Vitals and nursing note reviewed.   Constitutional:       General: He is not in acute distress.     Appearance: Normal appearance. He is obese. He is not ill-appearing, toxic-appearing or diaphoretic.   HENT:      Head: Normocephalic and atraumatic.      Right Ear: Tympanic membrane, ear canal and external ear normal.      Left Ear: Tympanic membrane, ear canal and external ear normal.      Nose: No congestion or rhinorrhea.      Mouth/Throat:      Mouth: Mucous membranes are moist.      Pharynx: Oropharynx is clear. No oropharyngeal exudate or posterior oropharyngeal erythema.   Eyes:      Extraocular Movements: Extraocular movements intact.      Conjunctiva/sclera: Conjunctivae normal.      Pupils: Pupils are equal, round, and reactive to light.   Cardiovascular:      Rate and Rhythm: Normal rate and regular rhythm.      Heart sounds: Normal heart sounds. No murmur heard.  Pulmonary:      Effort: Pulmonary effort is normal.      Breath sounds: Wheezing present. No rhonchi or rales.   Abdominal:      General: Abdomen is flat. There is distension.      Palpations: Abdomen is soft. There is no mass.      Tenderness: There is no abdominal tenderness. There is no guarding or rebound.      Hernia: No hernia is present.   Musculoskeletal:      Cervical back: Neck supple. No rigidity.   Lymphadenopathy:      Cervical: No cervical adenopathy.   Skin:     General: Skin is warm and dry.          Neurological:      Mental " Status: He is alert and oriented to person, place, and time.      Cranial Nerves: No cranial nerve deficit.      Motor: Weakness present.      Gait: Gait abnormal.      Deep Tendon Reflexes: Reflexes normal.   Psychiatric:         Mood and Affect: Mood normal.         Behavior: Behavior normal.         Thought Content: Thought content normal.         Judgment: Judgment normal.       Result Review      The following data was reviewed by Kimmy Riley MD on 02/07/2023.  Lab Results   Component Value Date    WBC 11.98 (H) 08/10/2023    HGB 11.4 (L) 08/10/2023    HCT 37.7 08/10/2023    MCV 90.6 08/10/2023     08/10/2023     Lab Results   Component Value Date    GLUCOSE 124 (H) 08/10/2023    BUN 24 (H) 08/10/2023    CREATININE 1.57 (H) 08/10/2023    EGFR 50.8 (L) 08/10/2023    BCR 15.3 08/10/2023    K 4.5 08/10/2023    CO2 36.2 (H) 08/10/2023    CALCIUM 9.2 08/10/2023    PROTENTOTREF 7.1 08/22/2022    ALBUMIN 3.6 05/25/2023    LABIL2 0.8 08/22/2022    AST 19 06/22/2022    ALT 20 06/22/2022     Lab Results   Component Value Date    CHOL 131 03/17/2022    CHLPL 165 08/26/2020    TRIG 69 03/17/2022    HDL 65 (H) 03/17/2022    LDL 52 03/17/2022     Lab Results   Component Value Date    TSH 0.954 05/25/2023     Lab Results   Component Value Date    HGBA1C 8.0 08/22/2023     Lab Results   Component Value Date    PSA 0.203 05/25/2023    PSA 0.725 03/17/2022                       Assessment and Plan:          Diagnoses and all orders for this visit:    1. Paroxysmal atrial fibrillation (Primary)  Comments:  NSR on exam today.  Continue metoprolol and Eliquis.  We will decrease metoprolol to 50 mg twice daily due to low BP.  F/U cardiology as directed    2. Gastroesophageal reflux disease without esophagitis  Comments:  Stable on Pepcid.  No changes made in current meds or treatment plan    3. Vitamin D deficiency  Comments:  Continue vitamin D supplementation    4. Chronic bilateral low back pain with bilateral  sciatica  Comments:  Stable.  No changes in current meds or treatment plan.  Today is his F2F for Tramadol/gabapentin.  No SI/SX diversion or abuse    5. Iron deficiency anemia secondary to inadequate dietary iron intake  Comments:  Continue iron supplementation    6. Type 2 diabetes mellitus with diabetic neuropathy, with long-term current use of insulin  Comments:  Slowly improving.  I want him to follow-up with endocrinology and continue current medications as directed. Rec updated eye exam and he defers  Orders:  -     POC Glycosylated Hemoglobin (Hb A1C)    7. Primary osteoarthritis of left knee  Comments:  Follow-up with Ortho as directed    8. Stage 3b chronic kidney disease (CKD)  Comments:  Labs reviewed with him today.  He is to avoid NSAIDs and push fluids 64 ounces a day    9. Major depressive disorder with single episode, in partial remission  Comments:  No acute issues.  He is stable on current meds.  No SI/HI    10. Benign prostatic hyperplasia with urinary retention  Comments:  Stable.  No changes in current meds or treatment plan    11. Essential (primary) hypertension  Comments:  Blood pressure is low so I will decrease his metoprolol to 50 mg twice a day    12. Chronic right shoulder pain  Comments:  Status post steroid injection.  Shoulder pain is better but has had all kinds of generalized weakness status post steroid injection    13. Hyperparathyroidism due to renal insufficiency  Comments:  We will see if I can get him back in with ENT sooner than Oct. We will recheck a PTH level. It was 208 several months ago but it was back under 100 recently  Orders:  -     PTH, Intact; Future    14. Hallucination  Comments:  We will see if I can get him back in with ENT sooner than Oct. We will recheck a PTH level. It was 208 several months ago but it was back under 100 recently  Orders:  -     Urinalysis With Culture If Indicated -; Future    15. Tremor  Comments:  We will see if I can get him back in  with ENT sooner than Oct.  We will recheck a PTH level.  It was 208 several months ago but it was back under 100 recently    16. High risk medication use  -     POC Urine Drug Screen [facility]    17. Productive cough  Comments:  We will recheck a sputum culture  Orders:  -     Respiratory Culture - Sputum, Bronchus; Future  -     Respiratory Culture - Sputum, Bronchus    18. Fall, initial encounter  Comments:  No signs of injury on physical exam other than some skin abrasions and bruising on lower extremity.  He will follow-up if he has any SI/SX of injury.          Follow Up   No follow-ups on file.

## 2023-08-23 ENCOUNTER — LAB (OUTPATIENT)
Dept: LAB | Facility: HOSPITAL | Age: 58
End: 2023-08-23
Payer: COMMERCIAL

## 2023-08-23 DIAGNOSIS — N25.81 HYPERPARATHYROIDISM DUE TO RENAL INSUFFICIENCY: ICD-10-CM

## 2023-08-23 DIAGNOSIS — R44.3 HALLUCINATION: ICD-10-CM

## 2023-08-23 LAB
BACTERIA UR QL AUTO: ABNORMAL /HPF
BILIRUB UR QL STRIP: NEGATIVE
CLARITY UR: CLEAR
COLOR UR: YELLOW
GLUCOSE UR STRIP-MCNC: ABNORMAL MG/DL
HGB UR QL STRIP.AUTO: ABNORMAL
KETONES UR QL STRIP: NEGATIVE
LEUKOCYTE ESTERASE UR QL STRIP.AUTO: NEGATIVE
NITRITE UR QL STRIP: NEGATIVE
PH UR STRIP.AUTO: 7 [PH] (ref 5–8)
PROT UR QL STRIP: ABNORMAL
RBC # UR STRIP: ABNORMAL /HPF
REF LAB TEST METHOD: ABNORMAL
SP GR UR STRIP: 1.02 (ref 1–1.03)
SQUAMOUS #/AREA URNS HPF: ABNORMAL /HPF
UROBILINOGEN UR QL STRIP: ABNORMAL
WBC # UR STRIP: ABNORMAL /HPF

## 2023-08-23 PROCEDURE — 81001 URINALYSIS AUTO W/SCOPE: CPT

## 2023-08-24 ENCOUNTER — PATIENT OUTREACH (OUTPATIENT)
Dept: CASE MANAGEMENT | Facility: OTHER | Age: 58
End: 2023-08-24
Payer: COMMERCIAL

## 2023-08-24 DIAGNOSIS — I10 ESSENTIAL (PRIMARY) HYPERTENSION: Primary | ICD-10-CM

## 2023-08-24 DIAGNOSIS — I10 ESSENTIAL HYPERTENSION: Primary | ICD-10-CM

## 2023-08-24 RX ORDER — METOPROLOL TARTRATE 50 MG/1
50 TABLET, FILM COATED ORAL 2 TIMES DAILY
COMMUNITY
End: 2023-08-24 | Stop reason: SDUPTHER

## 2023-08-24 RX ORDER — METOPROLOL TARTRATE 50 MG/1
50 TABLET, FILM COATED ORAL 2 TIMES DAILY
Qty: 180 TABLET | Refills: 1 | Status: SHIPPED | OUTPATIENT
Start: 2023-08-24

## 2023-08-24 NOTE — OUTREACH NOTE
AMBULATORY CASE MANAGEMENT NOTE    Name and Relationship of Patient/Support Person: [employer] -     Called Gómez to let him know the results of the urine test.  Clarification given regarding the metoprolol dose, continue to cut in half.  Will send new Rx to pharmacy.        Tara GOMEZ  Ambulatory Case Management    8/24/2023, 12:08 EDT

## 2023-08-25 ENCOUNTER — OFFICE VISIT (OUTPATIENT)
Dept: DIABETES SERVICES | Facility: CLINIC | Age: 58
End: 2023-08-25
Payer: COMMERCIAL

## 2023-08-25 VITALS
HEART RATE: 80 BPM | HEIGHT: 69 IN | DIASTOLIC BLOOD PRESSURE: 70 MMHG | OXYGEN SATURATION: 94 % | SYSTOLIC BLOOD PRESSURE: 145 MMHG | BODY MASS INDEX: 41.05 KG/M2

## 2023-08-25 DIAGNOSIS — E11.22 TYPE 2 DIABETES MELLITUS WITH STAGE 3A CHRONIC KIDNEY DISEASE, WITH LONG-TERM CURRENT USE OF INSULIN: ICD-10-CM

## 2023-08-25 DIAGNOSIS — Z79.4 TYPE 2 DIABETES MELLITUS WITH DIABETIC NEUROPATHY, WITH LONG-TERM CURRENT USE OF INSULIN: ICD-10-CM

## 2023-08-25 DIAGNOSIS — Z79.4 TYPE 2 DIABETES MELLITUS WITH STAGE 3A CHRONIC KIDNEY DISEASE, WITH LONG-TERM CURRENT USE OF INSULIN: ICD-10-CM

## 2023-08-25 DIAGNOSIS — E11.65 UNCONTROLLED TYPE 2 DIABETES MELLITUS WITH HYPERGLYCEMIA: Primary | ICD-10-CM

## 2023-08-25 DIAGNOSIS — E11.40 TYPE 2 DIABETES MELLITUS WITH DIABETIC NEUROPATHY, WITH LONG-TERM CURRENT USE OF INSULIN: ICD-10-CM

## 2023-08-25 DIAGNOSIS — N18.31 TYPE 2 DIABETES MELLITUS WITH STAGE 3A CHRONIC KIDNEY DISEASE, WITH LONG-TERM CURRENT USE OF INSULIN: ICD-10-CM

## 2023-08-25 DIAGNOSIS — E66.01 SEVERE OBESITY (BMI >= 40): ICD-10-CM

## 2023-08-25 DIAGNOSIS — Z97.8 USES SELF-APPLIED CONTINUOUS GLUCOSE MONITORING DEVICE: ICD-10-CM

## 2023-08-25 LAB
BACTERIA SPEC RESP CULT: NORMAL
GRAM STN SPEC: NORMAL

## 2023-08-25 PROCEDURE — 3052F HG A1C>EQUAL 8.0%<EQUAL 9.0%: CPT | Performed by: NURSE PRACTITIONER

## 2023-08-25 PROCEDURE — 95251 CONT GLUC MNTR ANALYSIS I&R: CPT | Performed by: NURSE PRACTITIONER

## 2023-08-25 PROCEDURE — 1160F RVW MEDS BY RX/DR IN RCRD: CPT | Performed by: NURSE PRACTITIONER

## 2023-08-25 PROCEDURE — 1159F MED LIST DOCD IN RCRD: CPT | Performed by: NURSE PRACTITIONER

## 2023-08-25 PROCEDURE — 99214 OFFICE O/P EST MOD 30 MIN: CPT | Performed by: NURSE PRACTITIONER

## 2023-08-25 PROCEDURE — 3077F SYST BP >= 140 MM HG: CPT | Performed by: NURSE PRACTITIONER

## 2023-08-25 PROCEDURE — 3078F DIAST BP <80 MM HG: CPT | Performed by: NURSE PRACTITIONER

## 2023-08-25 NOTE — PATIENT INSTRUCTIONS
Change your Lantus insulin to taking 25 units twice a day approximately 12 hours apart    Focus on monitoring your glucose levels before every meal or at least 3 times a day with your sensor.  Make sure you take your insulin before each meal based on the 5 to 10 units for the food plus adding your sliding scale correction to the meal dose as below:    IF BLOOD SUGAR < 160= 0 units  161-220= 2 units  221-280= 4 units  281-340 = 6 units  341-400 = 8 units  Above 400 = 10 units

## 2023-08-25 NOTE — PROGRESS NOTES
Chief Complaint  Diabetes (Follow up, med mgt, cgm eval )    Referred By: Kimmy Riley MD    Subjective          Preston Wallis presents to [facility] for diabetes medication management    History of Present Illness    Visit type:  follow-up  Diabetes type:  Type 2  Current diabetes status/concerns/issues:  His sugar has been up and down.  He is having some lows at night  Other health concerns: He has had some issues with low BP and they decreased his BP meds.  He has some times of disorientation and has tremors at times.  He has been found to have some thyroid nodules and has been referred to ENT  Current Diabetes symptoms:    Polyuria: No   Polydipsia: No   Polyphagia: No   Blurred vision: No   Excessive fatigue: Yes    Known Diabetes complications:  Neuropathy: Numbness, Tingling, Shooting Pain and Temperature variation (hot or cold sensations); Location: Feet  Renal: Stage IIIa moderate (GFR = 45-59 mL/min)  Eyes: None; Location: N/A  Amputation/Wounds: Healed wound on the right foot  GI: None  Cardiovascular: Hypertension, Hyperlipidemia and CHF  ED: None  Other: None  Hypoglycemia:  Level 1 hypoglycemia (54 mg/dL - 70 mg/dL); Frequency - he reports having lows in the 60s and Nocturnal - he reports most lows are overnight; this may represent compression lows with his CGM  Hypoglycemia Symptoms:  dizziness  Current diabetes treatment:  Byrureon 2 mg once weekly, Farxiga 5 mg once a day, Lantus 45 units once a day in the morning and Humalog using 5 to 10 units before each meal plus sliding scale as follows:  IF BLOOD SUGAR < 160= 0 units, 161-220= 2 units, 221-280= 4 units, 281-340 = 6 units, 341-400 = 8 units   Blood glucose device:  Bethany CGM  Blood glucose monitoring frequency:  Continuous per CGM  Blood glucose range/average:  The 14-day sensor report shows an average glucose of 245mg/dL, with 23% in target range ( mgdL), 27% in the high range (181-250 mg/dL), 50% in  the very high range (>250 mg/dL), 0% in the low range (54-70 mg/dL) and 0% in the very low range (<54 mg/dL). Data is sporadic due to failure to scan during the days  Glucose Source: Device Reviewed  Diet:  Limits high carb/sweet foods, Avoids sugary drinks  Activity/Exercise:  None    Past Medical History:   Diagnosis Date    Age-related cognitive decline     Allergic contact dermatitis     Allergies     Anemia     Bronchiectasis with acute lower respiratory infection     Charcot foot due to diabetes mellitus 9/10/2013    Chronic diastolic (congestive) heart failure     Chronic kidney disease     Chronic respiratory failure with hypoxia     Closed supracondylar fracture of femur 1/12/2022    COPD (chronic obstructive pulmonary disease)     Deep vein thrombosis (DVT) of lower extremity associated with air travel 1/13/2023    Dependence on supplemental oxygen     Eczema     Erectile dysfunction     due to organic reasons    Essential (primary) hypertension     Fracture     closed fracture of other tarsal and metatarsal bones    Fracture of proximal humerus 1/13/2023    GERD without esophagitis     High risk medication use     Hypercholesteremia     Hypomagnesemia     Infected stasis ulcer of left lower extremity 1/13/2023    Insomnia     Low back pain     Major depressive disorder     Morbid (severe) obesity due to excess calories     MRSA pneumonia     Muscle weakness     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 without evidence of necrosis     Obstructive sleep apnea (adult) (pediatric)     Other forms of dyspnea     Other long term (current) drug therapy     Other specified noninfective gastroenteritis and colitis     Other spondylosis, lumbar region     Pain in both knees     Paroxysmal atrial fibrillation     Peripheral neuropathy     attributed to type 2 diabetes    Pneumonia, unspecified organism     Polyneuropathy     Rash and other nonspecific skin eruption      Syncope and collapse     Tachycardia     Tinnitus 2023    Type 1 diabetes mellitus with diabetic chronic kidney disease     Type 2 diabetes mellitus     Unspecified fall, initial encounter     Urinary retention      Past Surgical History:   Procedure Laterality Date    CHOLECYSTECTOMY      CYSTOSCOPY      FEMUR SURGERY Left     Shravan placed    KNEE SURGERY Left     OTHER SURGICAL HISTORY Left     venous port    TONSILLECTOMY AND ADENOIDECTOMY       Family History   Problem Relation Age of Onset    Coronary artery disease Mother     Hypertension Mother     Diabetes type II Mother     Asthma Father     Diabetes type II Sister     Cancer Sister      Social History     Socioeconomic History    Marital status:    Tobacco Use    Smoking status: Former     Packs/day: 1.00     Years: 12.00     Pack years: 12.00     Types: Cigarettes     Start date:      Quit date:      Years since quittin.6    Smokeless tobacco: Never   Vaping Use    Vaping Use: Never used   Substance and Sexual Activity    Alcohol use: Not Currently    Drug use: Never    Sexual activity: Defer     Allergies   Allergen Reactions    Benadryl [Diphenhydramine] Itching    Proventil [Albuterol] Other (See Comments)     Mouth sores         Current Outpatient Medications:     atorvastatin (LIPITOR) 20 MG tablet, Take 1 tablet by mouth Every Night., Disp: 90 tablet, Rfl: 2    Budeson-Glycopyrrol-Formoterol (Breztri Aerosphere) 160-9-4.8 MCG/ACT aerosol inhaler, , Disp: , Rfl:     bumetanide (BUMEX) 2 MG tablet, Take 1 tablet BY MOUTH TWICE DAILY. call cardiology IF weight is greater THAN 2 pounds in 1 DAY OR 5 pounds in A WEEK., Disp: 180 tablet, Rfl: 1    calcium citrate (CALCITRATE) 950 (200 Ca) MG tablet, TAKE 1 TABLET BY MOUTH ONCE DAILY, Disp: 90 tablet, Rfl: 3    Cholecalciferol (Vitamin D3) 50 MCG (2000 UT) tablet, TAKE 1 TABLET BY MOUTH ONCE DAILY, Disp: 90 tablet, Rfl: 1    Continuous Blood Gluc  (FreeStyle Bethany 2 [place])  device, , Disp: , Rfl:     Continuous Blood Gluc Sensor (FreeStyle Bethany 2 Sensor) misc, 1 each Every 14 (Fourteen) Days., Disp: 2 each, Rfl: 11    Diclofenac Sodium (VOLTAREN) 1 % gel gel, Apply  topically to the appropriate area as directed 4 (Four) Times a Day., Disp: 100 g, Rfl: 2    docusate sodium (COLACE) 100 MG capsule, TAKE ONE CAPSULE BY MOUTH TWICE DAILY, Disp: 60 capsule, Rfl: 5    DULoxetine (CYMBALTA) 60 MG capsule, Take 1 capsule by mouth 2 (Two) Times a Day., Disp: 180 capsule, Rfl: 1    Eliquis 5 MG tablet tablet, Take 1 tablet BY MOUTH EVERY 12 hours, Disp: 180 tablet, Rfl: 2    exenatide er (Bydureon BCise) 2 MG/0.85ML auto-injector injection, Inject 0.85 mL under the skin into the appropriate area as directed 1 (One) Time Per Week for 168 days., Disp: 3.4 mL, Rfl: 5    famotidine (PEPCID) 40 MG tablet, Take 1 tablet BY MOUTH EVERY MORNING, Disp: 90 tablet, Rfl: 2    Farxiga 5 MG tablet tablet, TAKE 1 TABLET BY MOUTH EVERY DAY, Disp: 90 tablet, Rfl: 1    ferrous gluconate (FERGON) 324 MG tablet, TAKE 1 TABLET BY MOUTH ONCE DAILY with breakfast, Disp: 90 tablet, Rfl: 1    finasteride (PROSCAR) 5 MG tablet, Take 1 tablet by mouth Daily., Disp: 30 tablet, Rfl: 0    folic acid (FOLVITE) 1 MG tablet, Take 1 tablet by mouth Daily., Disp: 90 tablet, Rfl: 1    gabapentin (NEURONTIN) 300 MG capsule, TAKE ONE CAPSULE BY MOUTH EVERY MORNING AND TAKE TWO CAPSULES BY MOUTH EVERY NIGHT AT BEDTIME, Disp: 90 capsule, Rfl: 0    glucose blood test strip, 1 each by Other route Daily. Dx:   E11.40, Disp: 100 each, Rfl: 4    Insulin Lispro, 1 Unit Dial, (HUMALOG) 100 UNIT/ML solution pen-injector, inject EIGHT units UNDER THE SKIN INTO THE APPROPRIATE AREA THREE TIMES DAILY PER sliding scale, IF BLOOD SUGAR < 160= 0 units, 161-220= 2 units, 221-280= 4 units, 281-340 = SIX units, 341-400 = EIGHT units, Disp: 15 mL, Rfl: 1    isosorbide mononitrate (IMDUR) 30 MG 24 hr tablet, , Disp: , Rfl:     Lantus SoloStar 100  "UNIT/ML injection pen, INJECT 40 UNITS UNDER THE SKIN ONCE DAILY, Disp: 15 mL, Rfl: 1    losartan (COZAAR) 25 MG tablet, Take 1 tablet by mouth Every Morning., Disp: , Rfl:     Melatonin-Magnesium Citrate 1-71.5 MG tablet, Take 1 tablet by mouth Every Night., Disp: 90 tablet, Rfl: 1    metoprolol tartrate (LOPRESSOR) 50 MG tablet, Take 1 tablet by mouth 2 (Two) Times a Day., Disp: 180 tablet, Rfl: 1    montelukast (SINGULAIR) 10 MG tablet, TAKE 1 TABLET BY MOUTH EVERY NIGHT AT BEDTIME, Disp: 90 tablet, Rfl: 1    NIFEdipine XL (PROCARDIA XL) 30 MG 24 hr tablet, Take 1 tablet by mouth Every Morning., Disp: 90 tablet, Rfl: 2    O2 (OXYGEN), 2 Liter O2 - CONTINUOUS (route: Oxygen), Disp: , Rfl:     ondansetron ODT (ZOFRAN-ODT) 4 MG disintegrating tablet, Place 1 tablet on the tongue Every 8 (Eight) Hours As Needed for Nausea or Vomiting., Disp: 10 tablet, Rfl: 0    sodium chloride 3 % nebulizer solution, TAKE 4 MLS BY NEBULIZER TWICE DAILY, Disp: 240 mL, Rfl: 5    traMADol (ULTRAM) 50 MG tablet, Take 1 tablet by mouth Every 8 (Eight) Hours As Needed for Moderate Pain., Disp: 40 tablet, Rfl: 0    TRUEplus Lancets 28G misc, USE AS DIRECTED, Disp: 100 each, Rfl: 0    Ventolin  (90 Base) MCG/ACT inhaler, INHALE 2 PUFFS FOUR TIMES DAILY AS NEEDED FOR WHEEZING, Disp: 18 g, Rfl: 11    ipratropium-albuterol (DUO-NEB) 0.5-2.5 mg/3 ml nebulizer, Take 3 mL by nebulization 4 (Four) Times a Day As Needed for Wheezing or Shortness of Air for up to 30 days., Disp: 120 mL, Rfl: 5    Objective     Vitals:    08/25/23 0835   BP: 145/70   BP Location: Left arm   Patient Position: Sitting   Cuff Size: Adult   Pulse: 80   SpO2: 94%   Height: 175.3 cm (69\")   PainSc:   7     Body mass index is 41.05 kg/mý.    Physical Exam  Constitutional:       Appearance: Normal appearance. He is obese.      Comments: Severe Obesity (BMI >= 40) Pt Current BMI = 41.05    HENT:      Head: Normocephalic and atraumatic.      Right Ear: External ear " normal.      Left Ear: External ear normal.      Nose: Nose normal.   Eyes:      Extraocular Movements: Extraocular movements intact.      Conjunctiva/sclera: Conjunctivae normal.   Pulmonary:      Effort: Pulmonary effort is normal.   Musculoskeletal:         General: Normal range of motion.      Cervical back: Normal range of motion.   Skin:     General: Skin is warm and dry.   Neurological:      General: No focal deficit present.      Mental Status: He is alert and oriented to person, place, and time. Mental status is at baseline.   Psychiatric:         Mood and Affect: Mood normal.         Behavior: Behavior normal.         Thought Content: Thought content normal.         Judgment: Judgment normal.           Result Review :   The following data was reviewed by: CON Ashley on 08/25/2023:    Most Recent A1C          8/22/2023    11:11   HGBA1C Most Recent   Hemoglobin A1C 8.0        A1C Last 3 Results          1/13/2023    13:28 4/28/2023    08:28 8/22/2023    11:11   HGBA1C Last 3 Results   Hemoglobin A1C 7.8  8.9  8.0      A1c collected 8/22/2023  is 8.0%, indicating Uncontrolled Type II diabetes.  This result is down from the prior result of 8.9% collected on 4/28/2023    Creatinine   Date Value Ref Range Status   08/10/2023 1.57 (H) 0.76 - 1.27 mg/dL Final   05/25/2023 1.71 (H) 0.76 - 1.27 mg/dL Final     eGFR   Date Value Ref Range Status   08/10/2023 50.8 (L) >60.0 mL/min/1.73 Final   05/25/2023 46.1 (L) >60.0 mL/min/1.73 Final     Labs collected on 8/10/2023 show Stage IIIa moderate (GFR = 45-59 mL/min            Assessment: He has had improvement in his A1c although he remains above target.  He does sometimes miss his mealtime insulin.  He is not always scanning his sensor so he is not adding the correction dose to his meal dose.  He also reports having some lows during the overnight hours.  He does report having occasions when he feels dizzy when these alerts are occurring but not always.   This may represent some compression lows with his sensor.      Diagnoses and all orders for this visit:    1. Uncontrolled type 2 diabetes mellitus with hyperglycemia (Primary)    2. Type 2 diabetes mellitus with diabetic neuropathy, with long-term current use of insulin    3. Type 2 diabetes mellitus with stage 3a chronic kidney disease, with long-term current use of insulin    4. Severe obesity (BMI >= 40)    5. Uses self-applied continuous glucose monitoring device        Plan: Because of his concerns for nocturnal hypoglycemia we will try splitting his Lantus into twice daily dosing.  We will increase it to a total daily dose of 50 units by having the patient take 25 units twice a day.  The patient is to focus on routine scanning of his continuous glucose sensor and adding the previously prescribed correction dose of insulin to his meal dose insulin at every meal.  No other changes were made to the treatment plan.  A written copy of the sliding scale correction was provided and reviewed with the patient and his wife.    The patient will monitor his blood glucose levels using the continuous glucose sensor.  If he develops problematic hyperglycemia or hypoglycemia or adverse drug reactions, he will contact the office for further instructions.        Follow Up     Return in about 3 months (around 11/25/2023) for Medication Management, CGM Follow-up.    Patient was given instructions and counseling regarding his condition or for health maintenance advice. Please see specific information pulled into the AVS if appropriate.     CON Ashley  08/25/2023      Dictated Utilizing Dragon Dictation.  Please note that portions of this note were completed with a voice recognition program.  Part of this note may be an electronic transcription/translation of spoken language to printed text using the Dragon Dictation System.

## 2023-08-30 ENCOUNTER — PATIENT OUTREACH (OUTPATIENT)
Dept: CASE MANAGEMENT | Facility: OTHER | Age: 58
End: 2023-08-30
Payer: COMMERCIAL

## 2023-08-30 DIAGNOSIS — E11.65 UNCONTROLLED TYPE 2 DIABETES MELLITUS WITH HYPERGLYCEMIA, WITH LONG-TERM CURRENT USE OF INSULIN: ICD-10-CM

## 2023-08-30 DIAGNOSIS — Z79.4 UNCONTROLLED TYPE 2 DIABETES MELLITUS WITH HYPERGLYCEMIA, WITH LONG-TERM CURRENT USE OF INSULIN: ICD-10-CM

## 2023-08-30 DIAGNOSIS — M25.511 CHRONIC RIGHT SHOULDER PAIN: ICD-10-CM

## 2023-08-30 DIAGNOSIS — I10 ESSENTIAL (PRIMARY) HYPERTENSION: ICD-10-CM

## 2023-08-30 DIAGNOSIS — G62.9 PERIPHERAL POLYNEUROPATHY: ICD-10-CM

## 2023-08-30 DIAGNOSIS — N18.31 STAGE 3A CHRONIC KIDNEY DISEASE: Primary | ICD-10-CM

## 2023-08-30 DIAGNOSIS — G89.29 CHRONIC RIGHT SHOULDER PAIN: ICD-10-CM

## 2023-08-30 NOTE — OUTREACH NOTE
[facility] End of Month Documentation    This Chronic Medical Management Care Plan for Preston Wallis, 58 y.o. male, has been established; monitored and managed; reviewed and a new plan of care implemented for the month of August.  A cumulative time of 27  minutes was spent on this patient record this month, including phone call with care giver; electronic communication with primary care provider; electronic communication with pharmacist; chart review; phone call with patient.    Regarding the patient's problems: has Polyneuropathy; Paroxysmal atrial fibrillation; Obstructive sleep apnea; MRSA pneumonia; Low back pain; Chronic diastolic heart failure; Allergies; COPD exacerbation; Chronic anticoagulation; Benign prostatic hyperplasia; Impaired mobility and endurance; Stage 3a chronic kidney disease; Iron deficiency anemia secondary to inadequate dietary iron intake; Vitamin D deficiency; Class 3 severe obesity with serious comorbidity in adult; Lower extremity edema; Elevated alkaline phosphatase level; Venous insufficiency (chronic) (peripheral); Tobacco abuse, in remission; History of Pseudomonas pneumonia; Chronic dyspnea; Gastroesophageal reflux disease; Bronchiectasis without complication; Altered mental status; Hyperlipidemia; Luetscher's syndrome; Neurogenic bladder; Class 1 obesity; Pneumonia due to Pseudomonas species; Seizures; Primary osteoarthritis of left knee; Other constipation; Chronic obstructive pulmonary disease; Type 2 DM with CKD stage 3 and hypertension; Essential hypertension; Stage 3b chronic kidney disease (CKD); Annual physical exam; Long-term use of high-risk medication; Personal history of PE (pulmonary embolism); Encounter for aftercare for healing closed traumatic fracture of left femur; Chronic pain of left knee; and Primary osteoarthritis of right knee on their problem list., the following items were addressed: medications; transitions to medical care; medical records; referrals to [facility]  service providers and any changes can be found within the plan section of the note.  A detailed listing of time spent for chronic care management is tracked within each outreach encounter.  Current medications include:  has a current medication list which includes the following prescription(s): atorvastatin, breztri aerosphere, bumetanide, calcium citrate, vitamin d3, freestyle clau 2 reader, freestyle clau 2 sensor, diclofenac sodium, docusate sodium, duloxetine, eliquis, bydureon bcise, famotidine, farxiga, ferrous gluconate, finasteride, folic acid, gabapentin, glucose blood, insulin lispro (1 unit dial), ipratropium-albuterol, isosorbide mononitrate, lantus solostar, losartan, melatonin-magnesium citrate, metoprolol tartrate, montelukast, nifedipine xl, o2, ondansetron odt, sodium chloride, tramadol, trueplus lancets 28g, and ventolin hfa. and the patient is reported to be caregiver will take responsibility for med compliance; patient is compliant with medication protocol,  Medications are reported to be non-effective in controlling symptoms and changes have been made to the medication protocol.  Regarding these diagnoses, referrals were made to the following provider(s):  specialists.  All notes on chart for PCP to review.    The patient was monitored remotely for pain; medications; blood glucose; mood & behavior.    The patient's physical needs include:  help taking medications as prescribed; medication education; needs assistance with ADLs; resources for disability needs; DME supplies.     The patient's mental support needs include:  continued support    The patient's cognitive support needs include:  medication; continued support; needs assistance with ADLs; health care    The patient's psychosocial support needs include:  continued support; coordination of community providers; medication management or adherence    The patient's functional needs include: health care coverage; medication education; needs  assistance for ADLs; physical healthcare; physician referral; resources for disability needs    The patient's environmental needs include:  resources for disability needs    Care Plan overall comments:  Still not at goal, however improving. will continue to follow. Has many upcoming appointments and referrals.    Refer to previous outreach notes for more information on the areas listed above.    Monthly Billing Diagnoses  (N18.31) Stage 3a chronic kidney disease    (E11.65,  Z79.4) Uncontrolled type 2 diabetes mellitus with hyperglycemia, with long-term current use of insulin    (M25.511,  G89.29) Chronic right shoulder pain    (I10) Essential (primary) hypertension    (G62.9) Peripheral polyneuropathy    Medications   Medications have been reconciled    Care Plan progress this month:      Recently Modified Care Plans Updates made since 7/30/2023 12:00 AM      No recently modified care plans.            Current Specialty Plan of Care Status signed by both patient and provider    Instructions   Patient was provided an electronic copy of care plan  CCM services were explained and offered and patient has accepted these services.  Patient has given their written consent to receive CCM services and understands that this includes the authorization of electronic communication of medical information with the other treating providers.  Patient understands that they may stop CCM services at any time and these changes will be effective at the end of the calendar month and will effectively revocate the agreement of CCM services.  Patient understands that only one practitioner can furnish and be paid for CCM services during one calendar month.  Patient also understands that there may be co-payment or deductible fees in association with CCM services.  Patient will continue with at least monthly follow-up calls with the Ambulatory .    Tara GOMEZ  Ambulatory Case Management    8/30/2023, 15:48 EDT

## 2023-08-31 RX ORDER — NIFEDIPINE 30 MG/1
30 TABLET, EXTENDED RELEASE ORAL EVERY MORNING
Qty: 90 TABLET | Refills: 2 | OUTPATIENT
Start: 2023-08-31

## 2023-09-04 DIAGNOSIS — G89.29 CHRONIC BILATERAL LOW BACK PAIN WITH BILATERAL SCIATICA: ICD-10-CM

## 2023-09-04 DIAGNOSIS — M54.41 CHRONIC BILATERAL LOW BACK PAIN WITH BILATERAL SCIATICA: ICD-10-CM

## 2023-09-04 DIAGNOSIS — M54.42 CHRONIC BILATERAL LOW BACK PAIN WITH BILATERAL SCIATICA: ICD-10-CM

## 2023-09-04 DIAGNOSIS — G62.9 PERIPHERAL POLYNEUROPATHY: ICD-10-CM

## 2023-09-05 RX ORDER — GABAPENTIN 300 MG/1
CAPSULE ORAL
Qty: 90 CAPSULE | Refills: 0 | Status: SHIPPED | OUTPATIENT
Start: 2023-09-05

## 2023-09-06 DIAGNOSIS — I50.32 CHRONIC DIASTOLIC (CONGESTIVE) HEART FAILURE: ICD-10-CM

## 2023-09-06 DIAGNOSIS — I10 ESSENTIAL (PRIMARY) HYPERTENSION: ICD-10-CM

## 2023-09-06 RX ORDER — METOPROLOL TARTRATE 100 MG/1
TABLET ORAL
Qty: 180 TABLET | Refills: 2 | OUTPATIENT
Start: 2023-09-06

## 2023-09-06 RX ORDER — BUMETANIDE 2 MG/1
TABLET ORAL
Qty: 180 TABLET | Refills: 1 | OUTPATIENT
Start: 2023-09-06

## 2023-09-07 PROCEDURE — 80061 LIPID PANEL: CPT

## 2023-09-07 PROCEDURE — 83970 ASSAY OF PARATHORMONE: CPT

## 2023-09-10 DIAGNOSIS — E11.65 POORLY CONTROLLED TYPE 2 DIABETES MELLITUS WITH NEUROPATHY: ICD-10-CM

## 2023-09-10 DIAGNOSIS — E11.40 POORLY CONTROLLED TYPE 2 DIABETES MELLITUS WITH NEUROPATHY: ICD-10-CM

## 2023-09-11 ENCOUNTER — PATIENT OUTREACH (OUTPATIENT)
Dept: CASE MANAGEMENT | Facility: OTHER | Age: 58
End: 2023-09-11
Payer: COMMERCIAL

## 2023-09-11 DIAGNOSIS — I10 HYPERTENSION, ESSENTIAL: Primary | ICD-10-CM

## 2023-09-11 RX ORDER — INSULIN GLARGINE 100 [IU]/ML
INJECTION, SOLUTION SUBCUTANEOUS
Qty: 15 ML | Refills: 1 | Status: SHIPPED | OUTPATIENT
Start: 2023-09-11

## 2023-09-11 NOTE — OUTREACH NOTE
AMBULATORY CASE MANAGEMENT NOTE    Name and Relationship of Patient/Support Person: Kami Wallis - Emergency Contact    Home health nurse and PT have both reported high blood pressure readings. Last week 180/90 and today 154/90.  Recently his metoprolol dose was decreased to 50mg from 100mg.     PCP responded and would like to increase back to 100mg bid.  Gómez notified.     Also received a fax from [employer].  Have already completed and faxed this form back.  Called [employer] to confirm, 1 area not filled in.  Completed and refaxed.       Education Documentation  No documentation found.        Tara GOMEZ  Ambulatory Case Management    9/11/2023, 14:00 EDT

## 2023-09-12 DIAGNOSIS — E11.65 UNCONTROLLED TYPE 2 DIABETES MELLITUS WITH HYPERGLYCEMIA: ICD-10-CM

## 2023-09-12 RX ORDER — FLURBIPROFEN SODIUM 0.3 MG/ML
SOLUTION/ DROPS OPHTHALMIC
Qty: 360 EACH | Refills: 1 | Status: SHIPPED | OUTPATIENT
Start: 2023-09-12

## 2023-09-14 DIAGNOSIS — E78.5 HYPERLIPIDEMIA, UNSPECIFIED HYPERLIPIDEMIA TYPE: ICD-10-CM

## 2023-09-14 RX ORDER — ATORVASTATIN CALCIUM 20 MG/1
20 TABLET, FILM COATED ORAL NIGHTLY
Qty: 90 TABLET | Refills: 2 | OUTPATIENT
Start: 2023-09-14

## 2023-09-18 ENCOUNTER — TRANSCRIBE ORDERS (OUTPATIENT)
Dept: ADMINISTRATIVE | Facility: HOSPITAL | Age: 58
End: 2023-09-18
Payer: COMMERCIAL

## 2023-09-18 DIAGNOSIS — E21.3 HYPERPARATHYROIDISM: Primary | ICD-10-CM

## 2023-09-22 ENCOUNTER — TRANSCRIBE ORDERS (OUTPATIENT)
Dept: FAMILY MEDICINE CLINIC | Age: 58
End: 2023-09-22
Payer: COMMERCIAL

## 2023-09-22 DIAGNOSIS — E21.3 HYPERPARATHYROIDISM: Primary | ICD-10-CM

## 2023-09-24 DIAGNOSIS — M17.12 PRIMARY OSTEOARTHRITIS OF LEFT KNEE: ICD-10-CM

## 2023-09-27 DIAGNOSIS — E11.65 TYPE 2 DIABETES MELLITUS WITH HYPERGLYCEMIA, WITH LONG-TERM CURRENT USE OF INSULIN: ICD-10-CM

## 2023-09-27 DIAGNOSIS — R33.8 BENIGN PROSTATIC HYPERPLASIA WITH URINARY RETENTION: ICD-10-CM

## 2023-09-27 DIAGNOSIS — G62.9 PERIPHERAL POLYNEUROPATHY: ICD-10-CM

## 2023-09-27 DIAGNOSIS — N40.1 BENIGN PROSTATIC HYPERPLASIA WITH URINARY RETENTION: ICD-10-CM

## 2023-09-27 DIAGNOSIS — G89.29 CHRONIC BILATERAL LOW BACK PAIN WITH BILATERAL SCIATICA: ICD-10-CM

## 2023-09-27 DIAGNOSIS — M54.42 CHRONIC BILATERAL LOW BACK PAIN WITH BILATERAL SCIATICA: ICD-10-CM

## 2023-09-27 DIAGNOSIS — Z79.4 TYPE 2 DIABETES MELLITUS WITH HYPERGLYCEMIA, WITH LONG-TERM CURRENT USE OF INSULIN: ICD-10-CM

## 2023-09-27 DIAGNOSIS — M54.41 CHRONIC BILATERAL LOW BACK PAIN WITH BILATERAL SCIATICA: ICD-10-CM

## 2023-09-27 DIAGNOSIS — I50.32 CHRONIC DIASTOLIC (CONGESTIVE) HEART FAILURE: ICD-10-CM

## 2023-09-27 DIAGNOSIS — E78.5 HYPERLIPIDEMIA, UNSPECIFIED HYPERLIPIDEMIA TYPE: ICD-10-CM

## 2023-09-27 RX ORDER — FINASTERIDE 5 MG/1
5 TABLET, FILM COATED ORAL DAILY
Qty: 30 TABLET | Refills: 0 | Status: SHIPPED | OUTPATIENT
Start: 2023-09-27

## 2023-09-27 RX ORDER — DAPAGLIFLOZIN 5 MG/1
5 TABLET, FILM COATED ORAL DAILY
Qty: 30 TABLET | Refills: 0 | Status: SHIPPED | OUTPATIENT
Start: 2023-09-27

## 2023-09-27 RX ORDER — NIFEDIPINE 30 MG/1
30 TABLET, EXTENDED RELEASE ORAL EVERY MORNING
Qty: 30 TABLET | Refills: 0 | Status: SHIPPED | OUTPATIENT
Start: 2023-09-27

## 2023-09-27 RX ORDER — BUMETANIDE 2 MG/1
TABLET ORAL
Qty: 60 TABLET | Refills: 0 | Status: SHIPPED | OUTPATIENT
Start: 2023-09-27

## 2023-09-27 RX ORDER — ATORVASTATIN CALCIUM 20 MG/1
20 TABLET, FILM COATED ORAL NIGHTLY
Qty: 30 TABLET | Refills: 0 | Status: SHIPPED | OUTPATIENT
Start: 2023-09-27

## 2023-09-27 RX ORDER — GABAPENTIN 300 MG/1
CAPSULE ORAL
Qty: 90 CAPSULE | Refills: 0 | Status: SHIPPED | OUTPATIENT
Start: 2023-09-27

## 2023-09-28 ENCOUNTER — PATIENT OUTREACH (OUTPATIENT)
Dept: CASE MANAGEMENT | Facility: OTHER | Age: 58
End: 2023-09-28
Payer: COMMERCIAL

## 2023-09-28 DIAGNOSIS — E11.65 UNCONTROLLED TYPE 2 DIABETES MELLITUS WITH HYPERGLYCEMIA, WITH LONG-TERM CURRENT USE OF INSULIN: ICD-10-CM

## 2023-09-28 DIAGNOSIS — Z79.4 UNCONTROLLED TYPE 2 DIABETES MELLITUS WITH HYPERGLYCEMIA, WITH LONG-TERM CURRENT USE OF INSULIN: ICD-10-CM

## 2023-09-28 DIAGNOSIS — I50.32 CHRONIC DIASTOLIC (CONGESTIVE) HEART FAILURE: ICD-10-CM

## 2023-09-28 DIAGNOSIS — N18.31 STAGE 3A CHRONIC KIDNEY DISEASE: ICD-10-CM

## 2023-09-28 DIAGNOSIS — I10 HYPERTENSION, ESSENTIAL: Primary | ICD-10-CM

## 2023-09-28 PROCEDURE — 99490 CHRNC CARE MGMT STAFF 1ST 20: CPT | Performed by: FAMILY MEDICINE

## 2023-09-28 PROCEDURE — 99439 CHRNC CARE MGMT STAF EA ADDL: CPT | Performed by: FAMILY MEDICINE

## 2023-09-28 NOTE — OUTREACH NOTE
"AMBULATORY CASE MANAGEMENT NOTE    Name and Relationship of Patient/Support Person: Preston Wallis \"Gómez\" - Self    Attempting to get medications all synced for consistent refills for medication compliance.  Filled 30 days of medications and filled planners for patient. He has some testing set up for October, will continue to follow patient.    Noticed his ortho follow up was made in [facility], will change to [place].     Education Documentation  No documentation found.        Tara R  Ambulatory Case Management    9/28/2023, 13:33 EDT  [facility] End of Month Documentation    This Chronic Medical Management Care Plan for Preston Wallis, 58 y.o. male, has been established; monitored and managed; reviewed and a new plan of care implemented for the month of September.  A cumulative time of 53  minutes was spent on this patient record this month, including phone call with care giver; electronic communication with primary care provider; electronic communication with pharmacist; chart review; phone call with patient.    Regarding the patient's problems: has Polyneuropathy; Paroxysmal atrial fibrillation; Obstructive sleep apnea; MRSA pneumonia; Low back pain; Chronic diastolic heart failure; Allergies; COPD exacerbation; Chronic anticoagulation; Benign prostatic hyperplasia; Impaired mobility and endurance; Stage 3a chronic kidney disease; Iron deficiency anemia secondary to inadequate dietary iron intake; Vitamin D deficiency; Class 3 severe obesity with serious comorbidity in adult; Lower extremity edema; Elevated alkaline phosphatase level; Venous insufficiency (chronic) (peripheral); Tobacco abuse, in remission; History of Pseudomonas pneumonia; Chronic dyspnea; Gastroesophageal reflux disease; Bronchiectasis without complication; Altered mental status; Hyperlipidemia; Luetscher's syndrome; Neurogenic bladder; Class 1 obesity; Pneumonia due to Pseudomonas species; Seizures; Primary osteoarthritis of left knee; Other " constipation; Chronic obstructive pulmonary disease; 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  A detailed listing of time spent for chronic care management is tracked within each outreach encounter.  Current medications include:  has a current medication list which includes the following prescription(s): atorvastatin, b-d uf iii mini pen needles, breztri aerosphere, bumetanide, calcium citrate, vitamin d3, freestyle clau 2 reader, freestyle clau 2 sensor, farxiga, diclofenac sodium, docusate sodium, duloxetine, eliquis, bydureon bcise, famotidine, ferrous gluconate, finasteride, folic acid, gabapentin, glucose blood, insulin lispro (1 unit dial), ipratropium-albuterol, isosorbide mononitrate, lantus solostar, losartan, melatonin-magnesium citrate, metoprolol tartrate, montelukast, nifedipine xl, o2, ondansetron odt, sodium chloride, tramadol, trueplus lancets 28g, and ventolin hfa. and the patient is reported to be caregiver will take responsibility for med compliance; patient is compliant with medication protocol,  Medications are reported to be non-effective in controlling symptoms and changes have been made to the medication protocol.  Regarding these diagnoses, referrals were made to the following provider(s):  specialists.  All notes on chart for PCP to review.    The patient was monitored remotely for pain; medications; blood glucose; mood & behavior.    The patient's  physical needs include:  help taking medications as prescribed; medication education; needs assistance with ADLs; resources for disability needs; DME supplies.     The patient's mental support needs include:  continued support    The patient's cognitive support needs include:  medication; continued support; needs assistance with ADLs; health care    The patient's psychosocial support needs include:  continued support; coordination of community providers; medication management or adherence    The patient's functional needs include: health care coverage; medication education; needs assistance for ADLs; physical healthcare; physician referral; resources for disability needs    The patient's environmental needs include:  resources for disability needs    Care Plan overall comments:  Still not at goal, however improving. will continue to follow. Has many upcoming appointments and referrals.    Refer to previous outreach notes for more information on the areas listed above.    Monthly Billing Diagnoses  (I10) Hypertension, essential    (E11.65,  Z79.4) Uncontrolled type 2 diabetes mellitus with hyperglycemia, with long-term current use of insulin    (N18.31) Stage 3a chronic kidney disease    (I50.32) Chronic diastolic (congestive) heart failure    Medications   Medications have been reconciled    Care Plan progress this month:      Recently Modified Care Plans Updates made since 8/28/2023 12:00 AM      No recently modified care plans.            Current Specialty Plan of Care Status signed by both patient and provider    Instructions   Patient was provided an electronic copy of care plan  CCM services were explained and offered and patient has accepted these services.  Patient has given their written consent to receive CCM services and understands that this includes the authorization of electronic communication of medical information with the other treating providers.  Patient understands that they may stop CCM services  at any time and these changes will be effective at the end of the calendar month and will effectively revocate the agreement of CCM services.  Patient understands that only one practitioner can furnish and be paid for CCM services during one calendar month.  Patient also understands that there may be co-payment or deductible fees in association with CCM services.  Patient will continue with at least monthly follow-up calls with the Ambulatory .    Tara GOMEZ  Ambulatory Case Management    9/28/2023, 13:45 EDT

## 2023-10-03 DIAGNOSIS — M54.41 CHRONIC BILATERAL LOW BACK PAIN WITH BILATERAL SCIATICA: ICD-10-CM

## 2023-10-03 DIAGNOSIS — M54.42 CHRONIC BILATERAL LOW BACK PAIN WITH BILATERAL SCIATICA: ICD-10-CM

## 2023-10-03 DIAGNOSIS — G89.29 CHRONIC BILATERAL LOW BACK PAIN WITH BILATERAL SCIATICA: ICD-10-CM

## 2023-10-03 DIAGNOSIS — G62.9 PERIPHERAL POLYNEUROPATHY: ICD-10-CM

## 2023-10-11 ENCOUNTER — OFFICE VISIT (OUTPATIENT)
Dept: OTOLARYNGOLOGY | Facility: CLINIC | Age: 58
End: 2023-10-11
Payer: COMMERCIAL

## 2023-10-11 VITALS — BODY MASS INDEX: 41.18 KG/M2 | WEIGHT: 278 LBS | HEIGHT: 69 IN | TEMPERATURE: 97.4 F

## 2023-10-11 DIAGNOSIS — E21.3 HYPERPARATHYROIDISM: Primary | ICD-10-CM

## 2023-10-11 PROCEDURE — 1160F RVW MEDS BY RX/DR IN RCRD: CPT | Performed by: OTOLARYNGOLOGY

## 2023-10-11 PROCEDURE — 1159F MED LIST DOCD IN RCRD: CPT | Performed by: OTOLARYNGOLOGY

## 2023-10-11 PROCEDURE — 99204 OFFICE O/P NEW MOD 45 MIN: CPT | Performed by: OTOLARYNGOLOGY

## 2023-10-11 RX ORDER — GLIPIZIDE 10 MG/1
10 TABLET, FILM COATED, EXTENDED RELEASE ORAL
COMMUNITY
End: 2023-10-26

## 2023-10-11 RX ORDER — ASPIRIN 81 MG/1
TABLET, CHEWABLE ORAL EVERY 24 HOURS
COMMUNITY
End: 2023-10-26

## 2023-10-11 RX ORDER — FUROSEMIDE 40 MG/1
TABLET ORAL
COMMUNITY
End: 2023-10-26

## 2023-10-11 RX ORDER — FLUTICASONE PROPIONATE AND SALMETEROL 500; 50 UG/1; UG/1
POWDER RESPIRATORY (INHALATION)
COMMUNITY
End: 2023-10-26

## 2023-10-11 RX ORDER — TRAZODONE HYDROCHLORIDE 100 MG/1
100 TABLET ORAL DAILY
COMMUNITY
End: 2023-10-26

## 2023-10-11 RX ORDER — LISINOPRIL 10 MG/1
TABLET ORAL
COMMUNITY
End: 2023-10-26

## 2023-10-17 DIAGNOSIS — E11.65 UNCONTROLLED TYPE 2 DIABETES MELLITUS WITH HYPERGLYCEMIA: ICD-10-CM

## 2023-10-17 RX ORDER — EXENATIDE 2 MG/.85ML
INJECTION, SUSPENSION, EXTENDED RELEASE SUBCUTANEOUS
Qty: 3.4 ML | Refills: 5 | Status: SHIPPED | OUTPATIENT
Start: 2023-10-17

## 2023-10-24 ENCOUNTER — PATIENT OUTREACH (OUTPATIENT)
Dept: CASE MANAGEMENT | Facility: OTHER | Age: 58
End: 2023-10-24
Payer: COMMERCIAL

## 2023-10-24 DIAGNOSIS — N50.89 SWOLLEN TESTICLE: Primary | ICD-10-CM

## 2023-10-24 NOTE — OUTREACH NOTE
AMBULATORY CASE MANAGEMENT NOTE    Name and Relationship of Patient/Support Person: Kami Wallis G - Emergency Contact    Kami called reporting left testicle swelling.  Called urology and got an appointment for tomorrow while they are in [place] for SPECT testing.      Education Documentation  No documentation found.        Tara GOMEZ  Ambulatory Case Management    10/24/2023, 16:54 EDT

## 2023-10-25 ENCOUNTER — HOSPITAL ENCOUNTER (OUTPATIENT)
Dept: NUCLEAR MEDICINE | Facility: HOSPITAL | Age: 58
Discharge: HOME | End: 2023-10-25
Payer: COMMERCIAL

## 2023-10-25 ENCOUNTER — OFFICE VISIT (OUTPATIENT)
Dept: UROLOGY | Facility: CLINIC | Age: 58
End: 2023-10-25
Payer: COMMERCIAL

## 2023-10-25 VITALS
HEART RATE: 85 BPM | BODY MASS INDEX: 41.05 KG/M2 | DIASTOLIC BLOOD PRESSURE: 48 MMHG | SYSTOLIC BLOOD PRESSURE: 122 MMHG | TEMPERATURE: 97.8 F | HEIGHT: 69 IN

## 2023-10-25 DIAGNOSIS — N31.2 NEUROGENIC BLADDER, FLACCID: ICD-10-CM

## 2023-10-25 DIAGNOSIS — N30.00 ACUTE CYSTITIS WITHOUT HEMATURIA: ICD-10-CM

## 2023-10-25 DIAGNOSIS — E21.3 HYPERPARATHYROIDISM: ICD-10-CM

## 2023-10-25 DIAGNOSIS — Z78.9 INTERMITTENT SELF-CATHETERIZATION OF BLADDER: ICD-10-CM

## 2023-10-25 DIAGNOSIS — R33.9 URINARY RETENTION: ICD-10-CM

## 2023-10-25 DIAGNOSIS — N45.3 EPIDIDYMOORCHITIS: Primary | ICD-10-CM

## 2023-10-25 LAB
BACTERIA UR QL AUTO: ABNORMAL /HPF
BILIRUB BLD-MCNC: NEGATIVE MG/DL
CLARITY, POC: ABNORMAL
COLOR UR: YELLOW
EPI CELLS #/AREA URNS HPF: 0 /[HPF]
GLUCOSE UR STRIP-MCNC: ABNORMAL MG/DL
KETONES UR QL: NEGATIVE
LEUKOCYTE EST, POC: ABNORMAL
NITRITE UR-MCNC: NEGATIVE MG/ML
PH UR: 6 [PH] (ref 5–8)
PROT UR STRIP-MCNC: ABNORMAL MG/DL
RBC # UR STRIP: ABNORMAL /HPF
RBC # UR STRIP: ABNORMAL /UL
RENAL EPITHELIAL, POC: 0
SP GR UR: 1.02 (ref 1–1.03)
UNIDENT CRYS URNS QL MICRO: ABNORMAL /HPF
UROBILINOGEN UR QL: ABNORMAL
WBC # UR STRIP: ABNORMAL /HPF

## 2023-10-25 PROCEDURE — 0 TECHNETIUM SESTAMIBI: Performed by: FAMILY MEDICINE

## 2023-10-25 PROCEDURE — 87086 URINE CULTURE/COLONY COUNT: CPT | Performed by: UROLOGY

## 2023-10-25 PROCEDURE — 78072 PARATHYRD PLANAR W/SPECT&CT: CPT

## 2023-10-25 PROCEDURE — A9500 TC99M SESTAMIBI: HCPCS | Performed by: FAMILY MEDICINE

## 2023-10-25 PROCEDURE — 87147 CULTURE TYPE IMMUNOLOGIC: CPT | Performed by: UROLOGY

## 2023-10-25 RX ORDER — CEPHALEXIN 250 MG/1
250 CAPSULE ORAL 4 TIMES DAILY
Qty: 40 CAPSULE | Refills: 0 | Status: SHIPPED | OUTPATIENT
Start: 2023-10-25 | End: 2023-11-04

## 2023-10-25 RX ADMIN — TECHNETIUM TC 99M SESTAMIBI 1 DOSE: 1 INJECTION INTRAVENOUS at 12:05

## 2023-10-25 NOTE — PROGRESS NOTES
"Chief Complaint  Groin Swelling    Subjective  no acute distress        Preston Wallis presents to [facility] UROLOGY  History of Present Illness    58-year-old white male who is diabetic and has neuropathy of lower extremities and cannot walk.  Has neurogenic bladder with urinary retention and his wife catheterizes him 4 times a day.    Started having swelling of his left testicle about 2 days ago.  He has pain when he moves around.  Patient has some dysuria but there is no gross hematuria.  Patient has no fever or chills.  Patient cannot walk because of the weakness of lower extremities.    Objective no acute distress      Vital Signs:   /48   Pulse 85   Temp 97.8 °F (36.6 °C)   Ht 175.3 cm (69\")   BMI 41.05 kg/m²     Allergies   Allergen Reactions    Benadryl [Diphenhydramine] Itching    Proventil [Albuterol] Other (See Comments)     Mouth sores        Past medical history:  has a past medical history of Age-related cognitive decline, Allergic contact dermatitis, Allergies, Anemia, Bronchiectasis with acute lower respiratory infection, Charcot foot due to diabetes mellitus (9/10/2013), Chronic diastolic (congestive) heart failure, Chronic kidney disease, Chronic respiratory failure with hypoxia, Closed supracondylar fracture of femur (1/12/2022), COPD (chronic obstructive pulmonary disease), Deep vein thrombosis (DVT) of lower extremity associated with air travel (1/13/2023), Dependence on supplemental oxygen, Eczema, Erectile dysfunction, Essential (primary) hypertension, Fracture, Fracture of proximal humerus (1/13/2023), GERD without esophagitis, High risk medication use, Hypercholesteremia, Hypomagnesemia, Infected stasis ulcer of left lower extremity (1/13/2023), Insomnia, Low back pain, Major depressive disorder, Morbid (severe) obesity due to excess calories, MRSA pneumonia, Muscle weakness, 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 without " evidence of necrosis, Obstructive sleep apnea (adult) (pediatric), Other forms of dyspnea, Other long term (current) drug therapy, Other specified noninfective gastroenteritis and colitis, Other spondylosis, lumbar region, Pain in both knees, Paroxysmal atrial fibrillation, Peripheral neuropathy, Pneumonia, unspecified organism, Polyneuropathy, Rash and other nonspecific skin eruption, Syncope and collapse, Tachycardia, Tinnitus (1/13/2023), Type 1 diabetes mellitus with diabetic chronic kidney disease, Type 2 diabetes mellitus, Unspecified fall, initial encounter, and Urinary retention.   Past surgical history:  has a past surgical history that includes Cholecystectomy; tonsillectomy and adenoidectomy; Knee surgery (Left); Other surgical history (Left); Cystoscopy; and Femur Surgery (Left).  Personal history: family history includes Asthma in his father; Cancer in his sister; Coronary artery disease in his mother; Diabetes type II in his mother and sister; Hypertension in his mother.  Social history:  reports that he quit smoking about 30 years ago. His smoking use included cigarettes. He started smoking about 42 years ago. He has a 12.00 pack-year smoking history. He has never used smokeless tobacco. He reports that he does not currently use alcohol. He reports that he does not use drugs.    Review of Systems    Please see past medical and surgical history and rest of the system is negative    Physical Exam  Constitutional:       General: He is not in acute distress.     Appearance: Normal appearance. He is obese. He is not ill-appearing or toxic-appearing.      Comments: Patient is on the wheelchair   HENT:      Head: Normocephalic and atraumatic.      Ears:      Comments: No loss of hearing  Abdominal:      Palpations: Abdomen is soft. There is no mass.      Tenderness: There is no abdominal tenderness. There is no right CVA tenderness or left CVA tenderness.   Genitourinary:     Penis: Normal.       Comments:  Right testicle and epididymis is normal.  Right scrotum is normal.    Left scrotum is discolored and is pink.  Left epididymis and testicle is tender.  Combined mass is about 8 cm time 8 cm.  No fluctuant mass and no evidence of abscess yet.      Musculoskeletal:      Comments: Weakness of lower extremities   Skin:     General: Skin is warm.      Coloration: Skin is not jaundiced.   Neurological:      General: No focal deficit present.      Mental Status: He is alert and oriented to person, place, and time.      Motor: Weakness present.      Gait: Gait abnormal.   Psychiatric:         Mood and Affect: Mood normal.         Behavior: Behavior normal.         Thought Content: Thought content normal.         Judgment: Judgment normal.        Result Review :                 Assessment and Plan    Diagnoses and all orders for this visit:    1. Epididymoorchitis (Primary)  -     POC Urinalysis Dipstick  -     POC Urine Microscopic Only  -     Urine Culture - Urine, Urine, Clean Catch  -     cephalexin (KEFLEX) 250 MG capsule; Take 1 capsule by mouth 4 (Four) Times a Day for 10 days.  Dispense: 40 capsule; Refill: 0    2. Acute cystitis without hematuria  -     POC Urine Microscopic Only  -     Urine Culture - Urine, Urine, Clean Catch  -     cephalexin (KEFLEX) 250 MG capsule; Take 1 capsule by mouth 4 (Four) Times a Day for 10 days.  Dispense: 40 capsule; Refill: 0    3. Urinary retention    4. Neurogenic bladder, flaccid    5. Intermittent self-catheterization of bladder    I will start the patient on Keflex 250 mg 4 times a day for 10 days.  Urine culture is done and we may have to change the antibiotic on Friday if resistant to Keflex.  His BUN and creatinine is elevated so I think this is a safer drug than other choices at this time     Brief Urine Lab Results  (Last result in the past 365 days)        Color   Clarity   Blood   Leuk Est   Nitrite   Protein   CREAT   Urine HCG        10/25/23 1059 Yellow   Cloudy    Small   Small (1+)   Negative   100 mg/dL                    Follow Up   No follow-ups on file.  Patient was given instructions and counseling regarding his condition or for health maintenance advice. Please see specific information pulled into the AVS if appropriate.     Teresita White MD

## 2023-10-26 DIAGNOSIS — G89.29 CHRONIC BILATERAL LOW BACK PAIN WITH BILATERAL SCIATICA: ICD-10-CM

## 2023-10-26 DIAGNOSIS — I48.0 PAROXYSMAL ATRIAL FIBRILLATION: ICD-10-CM

## 2023-10-26 DIAGNOSIS — K21.9 GASTROESOPHAGEAL REFLUX DISEASE WITHOUT ESOPHAGITIS: ICD-10-CM

## 2023-10-26 DIAGNOSIS — F32.4 MAJOR DEPRESSIVE DISORDER WITH SINGLE EPISODE, IN PARTIAL REMISSION: ICD-10-CM

## 2023-10-26 DIAGNOSIS — N40.1 BENIGN PROSTATIC HYPERPLASIA WITH URINARY RETENTION: ICD-10-CM

## 2023-10-26 DIAGNOSIS — D50.8 IRON DEFICIENCY ANEMIA SECONDARY TO INADEQUATE DIETARY IRON INTAKE: ICD-10-CM

## 2023-10-26 DIAGNOSIS — M54.42 CHRONIC BILATERAL LOW BACK PAIN WITH BILATERAL SCIATICA: ICD-10-CM

## 2023-10-26 DIAGNOSIS — M54.41 CHRONIC BILATERAL LOW BACK PAIN WITH BILATERAL SCIATICA: ICD-10-CM

## 2023-10-26 DIAGNOSIS — G62.9 PERIPHERAL POLYNEUROPATHY: ICD-10-CM

## 2023-10-26 DIAGNOSIS — K59.09 OTHER CONSTIPATION: ICD-10-CM

## 2023-10-26 DIAGNOSIS — I50.32 CHRONIC DIASTOLIC (CONGESTIVE) HEART FAILURE: ICD-10-CM

## 2023-10-26 DIAGNOSIS — E11.65 TYPE 2 DIABETES MELLITUS WITH HYPERGLYCEMIA, WITH LONG-TERM CURRENT USE OF INSULIN: ICD-10-CM

## 2023-10-26 DIAGNOSIS — T78.40XS ALLERGY, SEQUELA: ICD-10-CM

## 2023-10-26 DIAGNOSIS — E55.9 VITAMIN D DEFICIENCY: ICD-10-CM

## 2023-10-26 DIAGNOSIS — N18.32 STAGE 3B CHRONIC KIDNEY DISEASE (CKD): ICD-10-CM

## 2023-10-26 DIAGNOSIS — Z79.4 TYPE 2 DIABETES MELLITUS WITH HYPERGLYCEMIA, WITH LONG-TERM CURRENT USE OF INSULIN: ICD-10-CM

## 2023-10-26 DIAGNOSIS — E78.5 HYPERLIPIDEMIA, UNSPECIFIED HYPERLIPIDEMIA TYPE: ICD-10-CM

## 2023-10-26 DIAGNOSIS — R33.8 BENIGN PROSTATIC HYPERPLASIA WITH URINARY RETENTION: ICD-10-CM

## 2023-10-26 RX ORDER — GABAPENTIN 300 MG/1
CAPSULE ORAL
Qty: 90 CAPSULE | Refills: 0 | OUTPATIENT
Start: 2023-10-26

## 2023-10-26 RX ORDER — DAPAGLIFLOZIN 5 MG/1
5 TABLET, FILM COATED ORAL DAILY
Qty: 90 TABLET | Refills: 2 | Status: SHIPPED | OUTPATIENT
Start: 2023-10-26

## 2023-10-26 RX ORDER — BUMETANIDE 2 MG/1
TABLET ORAL
Qty: 180 TABLET | Refills: 2 | Status: SHIPPED | OUTPATIENT
Start: 2023-10-26

## 2023-10-26 RX ORDER — FAMOTIDINE 40 MG/1
40 TABLET, FILM COATED ORAL EVERY MORNING
Qty: 90 TABLET | Refills: 2 | Status: SHIPPED | OUTPATIENT
Start: 2023-10-26

## 2023-10-26 RX ORDER — METOPROLOL TARTRATE 100 MG/1
100 TABLET ORAL 2 TIMES DAILY
COMMUNITY
End: 2023-10-26 | Stop reason: SDUPTHER

## 2023-10-26 RX ORDER — ATORVASTATIN CALCIUM 20 MG/1
20 TABLET, FILM COATED ORAL NIGHTLY
Qty: 90 TABLET | Refills: 2 | Status: SHIPPED | OUTPATIENT
Start: 2023-10-26

## 2023-10-26 RX ORDER — CHOLECALCIFEROL (VITAMIN D3) 125 MCG
1 CAPSULE ORAL DAILY
Qty: 90 TABLET | Refills: 2 | Status: SHIPPED | OUTPATIENT
Start: 2023-10-26

## 2023-10-26 RX ORDER — DOCUSATE SODIUM 100 MG/1
100 CAPSULE, LIQUID FILLED ORAL DAILY
Qty: 90 CAPSULE | Refills: 2 | Status: SHIPPED | OUTPATIENT
Start: 2023-10-26

## 2023-10-26 RX ORDER — FOLIC ACID 1 MG/1
1 TABLET ORAL DAILY
Qty: 90 TABLET | Refills: 2 | Status: SHIPPED | OUTPATIENT
Start: 2023-10-26

## 2023-10-26 RX ORDER — GABAPENTIN 300 MG/1
CAPSULE ORAL
Qty: 270 CAPSULE | Refills: 2 | Status: SHIPPED | OUTPATIENT
Start: 2023-10-26

## 2023-10-26 RX ORDER — DULOXETIN HYDROCHLORIDE 60 MG/1
60 CAPSULE, DELAYED RELEASE ORAL 2 TIMES DAILY
Qty: 180 CAPSULE | Refills: 2 | Status: SHIPPED | OUTPATIENT
Start: 2023-10-26

## 2023-10-26 RX ORDER — NIFEDIPINE 30 MG/1
30 TABLET, EXTENDED RELEASE ORAL EVERY MORNING
Qty: 90 TABLET | Refills: 2 | Status: SHIPPED | OUTPATIENT
Start: 2023-10-26

## 2023-10-26 RX ORDER — METOPROLOL TARTRATE 100 MG/1
100 TABLET ORAL 2 TIMES DAILY
Qty: 189 TABLET | Refills: 2 | Status: SHIPPED | OUTPATIENT
Start: 2023-10-26

## 2023-10-26 RX ORDER — FINASTERIDE 5 MG/1
5 TABLET, FILM COATED ORAL DAILY
Qty: 90 TABLET | Refills: 2 | Status: SHIPPED | OUTPATIENT
Start: 2023-10-26

## 2023-10-26 RX ORDER — IBUPROFEN 200 MG
950 CAPSULE ORAL DAILY
Qty: 90 TABLET | Refills: 2 | Status: SHIPPED | OUTPATIENT
Start: 2023-10-26

## 2023-10-26 RX ORDER — FERROUS GLUCONATE 324(38)MG
1 TABLET ORAL
Qty: 90 TABLET | Refills: 2 | Status: SHIPPED | OUTPATIENT
Start: 2023-10-26

## 2023-10-26 RX ORDER — MONTELUKAST SODIUM 10 MG/1
10 TABLET ORAL
Qty: 90 TABLET | Refills: 2 | Status: SHIPPED | OUTPATIENT
Start: 2023-10-26

## 2023-10-27 LAB — BACTERIA SPEC AEROBE CULT: ABNORMAL

## 2023-10-30 ENCOUNTER — PATIENT OUTREACH (OUTPATIENT)
Dept: CASE MANAGEMENT | Facility: OTHER | Age: 58
End: 2023-10-30
Payer: COMMERCIAL

## 2023-10-30 DIAGNOSIS — I50.32 CHRONIC DIASTOLIC (CONGESTIVE) HEART FAILURE: ICD-10-CM

## 2023-10-30 DIAGNOSIS — G62.9 PERIPHERAL POLYNEUROPATHY: Primary | ICD-10-CM

## 2023-10-30 DIAGNOSIS — Z79.4 TYPE 2 DIABETES MELLITUS WITH HYPERGLYCEMIA, WITH LONG-TERM CURRENT USE OF INSULIN: ICD-10-CM

## 2023-10-30 DIAGNOSIS — E11.65 TYPE 2 DIABETES MELLITUS WITH HYPERGLYCEMIA, WITH LONG-TERM CURRENT USE OF INSULIN: ICD-10-CM

## 2023-10-30 PROCEDURE — 99439 CHRNC CARE MGMT STAF EA ADDL: CPT | Performed by: FAMILY MEDICINE

## 2023-10-30 PROCEDURE — 99490 CHRNC CARE MGMT STAFF 1ST 20: CPT | Performed by: FAMILY MEDICINE

## 2023-10-30 NOTE — OUTREACH NOTE
AMBULATORY CASE MANAGEMENT NOTE    Name and Relationship of Patient/Support Person: Kami Wallis G - Emergency Contact    Kami/Gómez came in with medications today.  Was able to assist planning 30 days of medication.     They report that the reason cancelling the urology appointment was he is better and has an eye appointment same day.  Advised to reschedule if needed.     Outreach created for follow up with diabetes and PCP.   [facility] questionnaire sent to patient.     Tara R  Ambulatory Case Management    10/30/2023, 12:01 EDT  [facility] End of Month Documentation    This Chronic Medical Management Care Plan for Preston Wallis, 58 y.o. male, has been established; monitored and managed; reviewed and a new plan of care implemented for the month of October.  A cumulative time of 56  minutes was spent on this patient record this month, including phone call with care giver; electronic communication with primary care provider; electronic communication with pharmacist; chart review; phone call with patient.    Regarding the patient's problems: has Polyneuropathy; Paroxysmal atrial fibrillation; Obstructive sleep apnea; MRSA pneumonia; Low back pain; Chronic diastolic heart failure; Allergies; COPD exacerbation; Chronic anticoagulation; Benign prostatic hyperplasia; Impaired mobility and endurance; Stage 3a chronic kidney disease; Iron deficiency anemia secondary to inadequate dietary iron intake; Vitamin D deficiency; Class 3 severe obesity with serious comorbidity in adult; Lower extremity edema; Elevated alkaline phosphatase level; Venous insufficiency (chronic) (peripheral); Tobacco abuse, in remission; History of Pseudomonas pneumonia; Chronic dyspnea; Gastroesophageal reflux disease; Bronchiectasis without complication; Altered mental status; Hyperlipidemia; Luetscher's syndrome; Neurogenic bladder; Class 1 obesity; Pneumonia due to Pseudomonas species; Seizures; Primary osteoarthritis of left knee; Other constipation;  Chronic obstructive pulmonary disease; 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  A detailed listing of time spent for chronic care management is tracked within each outreach encounter.  Current medications include:  has a current medication list which includes the following prescription(s): apixaban, atorvastatin, b-d uf iii mini pen needles, breztri aerosphere, bumetanide, bydureon bcise, calcium citrate, cephalexin, vitamin d3, freestyle clau 2 reader, freestyle clau 2 sensor, farxiga, docusate sodium, duloxetine, famotidine, ferrous gluconate, finasteride, folic acid, gabapentin, glucose blood, insulin lispro (1 unit dial), lantus solostar, losartan, metoprolol tartrate, montelukast, nifedipine xl, o2, ondansetron odt, trueplus lancets 28g, and ventolin hfa. and the patient is reported to be caregiver will take responsibility for med compliance; patient is compliant with medication protocol,  Medications are reported to be non-effective in controlling symptoms and changes have been made to the medication protocol.  Regarding these diagnoses, referrals were made to the following provider(s):  specialists.  All notes on chart for PCP to review.    The patient was monitored remotely for pain; medications; blood glucose; mood & behavior.    The patient's physical needs include:  help taking medications as prescribed; medication education; needs assistance with ADLs; resources for  disability needs; DME supplies.     The patient's mental support needs include:  continued support    The patient's cognitive support needs include:  medication; continued support; needs assistance with ADLs; health care    The patient's psychosocial support needs include:  continued support; coordination of community providers; medication management or adherence    The patient's functional needs include: health care coverage; medication education; needs assistance for ADLs; physical healthcare; physician referral; resources for disability needs    The patient's environmental needs include:  resources for disability needs    Care Plan overall comments:  Still not at goal, however improving. will continue to follow. Has many upcoming appointments and referrals.    Refer to previous outreach notes for more information on the areas listed above.    Monthly Billing Diagnoses  (G62.9) Peripheral polyneuropathy    (I50.32) Chronic diastolic (congestive) heart failure    (E11.65,  Z79.4) Type 2 diabetes mellitus with hyperglycemia, with long-term current use of insulin    Medications   Medications have been reconciled    Care Plan progress this month:      Recently Modified Care Plans Updates made since 9/29/2023 12:00 AM      No recently modified care plans.            Current Specialty Plan of Care Status signed by both patient and provider    Instructions   Patient was provided an electronic copy of care plan  CCM services were explained and offered and patient has accepted these services.  Patient has given their written consent to receive CCM services and understands that this includes the authorization of electronic communication of medical information with the other treating providers.  Patient understands that they may stop CCM services at any time and these changes will be effective at the end of the calendar month and will effectively revocate the agreement of CCM services.  Patient understands that only one  practitioner can furnish and be paid for CCM services during one calendar month.  Patient also understands that there may be co-payment or deductible fees in association with CCM services.  Patient will continue with at least monthly follow-up calls with the Ambulatory .    Tara GOMEZ  Ambulatory Case Management    10/30/2023, 12:01 EDT

## 2023-10-31 NOTE — PROGRESS NOTES
Patient Name: Preston Wallis   Visit Date: 10/11/2023   Patient ID: [id number]  Provider: Nilton Sandoval MD    Sex: male  [place] [facility] [place] [facility]   YOB: 1965  Location Address: [address] [place] [address], [address] [place] [address]?[place]?[id number]    Primary Care Provider Kimmy Riley MD  Location Phone: [phone]    Referring Provider: Kimmy Riley MD        Chief Complaint  Hyperthyroidism (New patient )    Subjective    History of Present Illness  Preston Wallis is a 58 y.o. male who presents to [facility] today as a consult from Kimmy Riley MD.    He presents to the clinic today for evaluation of elevated PTH levels and possible hyperparathyroidism.  I reviewed his blood work in May 8 his levels were significantly elevated at 280, but dropped to 86.1 on May 25.  They were again elevated to 306 in September.  He has a lot of medical comorbidities including significant heart issues, lung issues, diabetic kidney disease, and neurogenic bladder requiring catheterization.  Notes that his diabetes is not well controlled.  He does have congestive heart failure and has had chronic lower extremity edema.  He does have a history of pulmonary embolism, and is currently on Eliquis.    He did have a thyroid ultrasound and I reviewed this with him today.  This shows a 7 mm hypoechoic nodule along the inferior right thyroid which could be a small lymph node, parathyroid nodule, or thyroid nodule.  Sestamibi scan was recommended as a consideration.  Several other small nodules were seen measuring about 6 mm.    Past Medical History:   Diagnosis Date    Age-related cognitive decline     Allergic contact dermatitis     Allergies     Anemia     Bronchiectasis with acute lower respiratory infection     Charcot foot due to diabetes mellitus 9/10/2013    Chronic diastolic (congestive) heart failure     Chronic kidney disease     Chronic respiratory failure with  hypoxia     Closed supracondylar fracture of femur 1/12/2022    COPD (chronic obstructive pulmonary disease)     Deep vein thrombosis (DVT) of lower extremity associated with air travel 1/13/2023    Dependence on supplemental oxygen     Eczema     Erectile dysfunction     due to organic reasons    Essential (primary) hypertension     Fracture     closed fracture of other tarsal and metatarsal bones    Fracture of proximal humerus 1/13/2023    GERD without esophagitis     High risk medication use     Hypercholesteremia     Hypomagnesemia     Infected stasis ulcer of left lower extremity 1/13/2023    Insomnia     Low back pain     Major depressive disorder     Morbid (severe) obesity due to excess calories     MRSA pneumonia     Muscle weakness     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 without evidence of necrosis     Obstructive sleep apnea (adult) (pediatric)     Other forms of dyspnea     Other long term (current) drug therapy     Other specified noninfective gastroenteritis and colitis     Other spondylosis, lumbar region     Pain in both knees     Paroxysmal atrial fibrillation     Peripheral neuropathy     attributed to type 2 diabetes    Pneumonia, unspecified organism     Polyneuropathy     Rash and other nonspecific skin eruption     Syncope and collapse     Tachycardia     Tinnitus 1/13/2023    Type 1 diabetes mellitus with diabetic chronic kidney disease     Type 2 diabetes mellitus     Unspecified fall, initial encounter     Urinary retention        Past Surgical History:   Procedure Laterality Date    CHOLECYSTECTOMY      CYSTOSCOPY      FEMUR SURGERY Left     Shravan placed    KNEE SURGERY Left     OTHER SURGICAL HISTORY Left     venous port    TONSILLECTOMY AND ADENOIDECTOMY           Current Outpatient Medications:     B-D UF III MINI PEN NEEDLES 31G X 5 MM misc, USE FOUR TIMES DAILY BEFORE MEALS AND AT BEDTIME, Disp: 360 each, Rfl: 1     Budeson-Glycopyrrol-Formoterol (Breztri Aerosphere) 160-9-4.8 MCG/ACT aerosol inhaler, , Disp: , Rfl:     Continuous Blood Gluc  (FreeStyle Bethany 2 Bevier) device, , Disp: , Rfl:     Continuous Blood Gluc Sensor (FreeStyle Bethany 2 Sensor) misc, 1 each Every 14 (Fourteen) Days., Disp: 2 each, Rfl: 11    glucose blood test strip, 1 each by Other route Daily. Dx:   E11.40, Disp: 100 each, Rfl: 4    Insulin Lispro, 1 Unit Dial, (HUMALOG) 100 UNIT/ML solution pen-injector, inject EIGHT units UNDER THE SKIN INTO THE APPROPRIATE AREA THREE TIMES DAILY PER sliding scale, IF BLOOD SUGAR < 160= 0 units, 161-220= 2 units, 221-280= 4 units, 281-340 = SIX units, 341-400 = EIGHT units, Disp: 15 mL, Rfl: 1    Lantus SoloStar 100 UNIT/ML injection pen, INJECT 40 UNITS UNDER THE SKIN ONCE DAILY, Disp: 15 mL, Rfl: 1    losartan (COZAAR) 25 MG tablet, Take 1 tablet by mouth Every Morning., Disp: , Rfl:     O2 (OXYGEN), 2 Liter O2 - CONTINUOUS (route: Oxygen), Disp: , Rfl:     ondansetron ODT (ZOFRAN-ODT) 4 MG disintegrating tablet, Place 1 tablet on the tongue Every 8 (Eight) Hours As Needed for Nausea or Vomiting., Disp: 10 tablet, Rfl: 0    TRUEplus Lancets 28G misc, USE AS DIRECTED, Disp: 100 each, Rfl: 0    Ventolin  (90 Base) MCG/ACT inhaler, INHALE 2 PUFFS FOUR TIMES DAILY AS NEEDED FOR WHEEZING, Disp: 18 g, Rfl: 11    apixaban (Eliquis) 5 MG tablet tablet, Take 1 tablet by mouth Every 12 (Twelve) Hours., Disp: 180 tablet, Rfl: 2    atorvastatin (LIPITOR) 20 MG tablet, Take 1 tablet by mouth Every Night., Disp: 90 tablet, Rfl: 2    bumetanide (BUMEX) 2 MG tablet, Take 1 tablet BY MOUTH TWICE DAILY. call cardiology IF weight is greater THAN 2 pounds in 1 DAY OR 5 pounds in A WEEK., Disp: 180 tablet, Rfl: 2    Bydureon BCise 2 MG/0.85ML auto-injector injection, INJECT 2 MG UNDER THE SKIN INTO THE APPROPRIATE AREA ONCE WEEKLY, Disp: 3.4 mL, Rfl: 5    calcium citrate (CALCITRATE) 950 (200 Ca) MG tablet, Take 1 tablet  by mouth Daily., Disp: 90 tablet, Rfl: 2    cephalexin (KEFLEX) 250 MG capsule, Take 1 capsule by mouth 4 (Four) Times a Day for 10 days., Disp: 40 capsule, Rfl: 0    Cholecalciferol (Vitamin D3) 50 MCG (2000 UT) tablet, Take 1 tablet by mouth Daily., Disp: 90 tablet, Rfl: 2    dapagliflozin (Farxiga) 5 MG tablet tablet, Take 1 tablet by mouth Daily., Disp: 90 tablet, Rfl: 2    docusate sodium (COLACE) 100 MG capsule, Take 1 capsule by mouth Daily., Disp: 90 capsule, Rfl: 2    DULoxetine (CYMBALTA) 60 MG capsule, Take 1 capsule by mouth 2 (Two) Times a Day., Disp: 180 capsule, Rfl: 2    famotidine (PEPCID) 40 MG tablet, Take 1 tablet by mouth Every Morning., Disp: 90 tablet, Rfl: 2    ferrous gluconate (FERGON) 324 MG tablet, Take 1 tablet by mouth Daily With Breakfast., Disp: 90 tablet, Rfl: 2    finasteride (PROSCAR) 5 MG tablet, Take 1 tablet by mouth Daily., Disp: 90 tablet, Rfl: 2    folic acid (FOLVITE) 1 MG tablet, Take 1 tablet by mouth Daily., Disp: 90 tablet, Rfl: 2    gabapentin (NEURONTIN) 300 MG capsule, TAKE ONE CAPSULE BY MOUTH EVERY MORNING AND TAKE TWO CAPSULES BY MOUTH EVERY NIGHT AT BEDTIME, Disp: 270 capsule, Rfl: 2    metoprolol tartrate (LOPRESSOR) 100 MG tablet, Take 1 tablet by mouth 2 (Two) Times a Day., Disp: 189 tablet, Rfl: 2    montelukast (SINGULAIR) 10 MG tablet, Take 1 tablet by mouth every night at bedtime., Disp: 90 tablet, Rfl: 2    NIFEdipine XL (PROCARDIA XL) 30 MG 24 hr tablet, Take 1 tablet by mouth Every Morning., Disp: 90 tablet, Rfl: 2     Allergies   Allergen Reactions    Benadryl [Diphenhydramine] Itching    Proventil [Albuterol] Other (See Comments)     Mouth sores         Family History   Problem Relation Age of Onset    Coronary artery disease Mother     Hypertension Mother     Diabetes type II Mother     Asthma Father     Diabetes type II Sister     Cancer Sister         Social History     Social History Narrative    Not on file       Objective     Vital Signs:   Temp  "97.4 °F (36.3 °C) (Tympanic)   Ht 175.3 cm (69\")   Wt 126 kg (278 lb)   BMI 41.05 kg/m²       Physical Exam    Constitutional   Appearance  : well developed, well-nourished, alert and in no acute distress, voice clear and strong    Head  Inspection  : no deformities or lesions  Face  Inspection  : No facial lesions; House-Brackmann I/VI bilaterally  Palpation  : No TMJ crepitus nor  muscle tenderness bilaterally    Eyes  Vision  Visual Fields  : Extraocular movements are intact. No spontaneous or gaze-induced nystagmus.  Conjunctivae  : clear  Sclerae  : clear  Pupils and Irises  : pupils equal, round, and reactive to light.     Ears, Nose, Mouth and Throat    Ears    External Ears  : appearance within normal limits, no lesions present  Otoscopic Examination  : Tympanic membrane appearance within normal limits bilaterally without perforations, well-aerated middle ears  Hearing  : intact to conversational voice both ears  Tunning fork testing:     :    Nose    External Nose  : appearance normal  Intranasal Exam  : mucosa within normal limits, vestibules normal, no intranasal lesions present, septum midline, sinuses non tender to percussion  Oral Cavity    Oral Mucosa  : oral mucosa normal without pallor or cyanosis  Lips  : lip appearance normal  Teeth  : normal dentition for age  Gums  : gums pink, non-swollen, no bleeding present  Tongue  : tongue appearance normal; normal mobility  Palate  : hard palate normal, soft palate appearance normal with symmetric mobility    Throat    Oropharynx  : no inflammation or lesions present, tonsils within normal limits  Hypopharynx  : appearance within normal limits, superior epiglottis within normal limits  Larynx  : appearance within normal limits, vocal cords within normal limits, no lesions present    Neck  Inspection/Palpation  : normal appearance, no masses or tenderness, trachea midline; thyroid size normal, nontender, no nodules or masses present on " palpation    Respiratory  Respiratory Effort  : breathing unlabored  Inspection of Chest  : normal appearance, no retractions    Cardiovascular  Heart  : regular rate and rhythm    Lymphatic  Neck  : no lymphadenopathy present  Supraclavicular Nodes  : no lymphadenopathy present  Preauricular Nodes  : no lymphadenopathy present    Skin and Subcutaneous Tissue  General Inspection  : Regarding face and neck - there are no rashes present, no lesions present, and no areas of discoloration    Neurologic  Cranial Nerves  : cranial nerves II-XII are grossly intact bilaterally  Gait and Station  : normal gait, able to stand without diffculty    Psychiatric  Judgement and Insight  : judgment and insight intact  Mood and Affect  : mood normal, affect appropriate              Assessment and Plan    Diagnoses and all orders for this visit:    1. Hyperparathyroidism (Primary)    Examination today reveals a gentleman that has multiple comorbidities with no palpable abnormality on neck exam.  In reviewing his lab work and ultrasound it does appear to be consistent with hyperparathyroidism but he has had a lot of fluctuation.  Given his medical comorbidities he has significant surgical risks.  There is a 7 mm hypoechoic nodule adjacent to the inferior right thyroid that could reflect a parathyroid adenoma.  We will consider a sestamibi scan.  I like to see him in the clinic afterwards to discuss further management.    Follow Up   No follow-ups on file.  Patient was given instructions and counseling regarding his condition or for health maintenance advice. Please see specific information pulled into the AVS if appropriate.

## 2023-11-06 ENCOUNTER — OFFICE VISIT (OUTPATIENT)
Dept: UROLOGY | Facility: CLINIC | Age: 58
End: 2023-11-06
Payer: COMMERCIAL

## 2023-11-06 VITALS
HEART RATE: 75 BPM | WEIGHT: 278 LBS | HEIGHT: 69 IN | BODY MASS INDEX: 41.18 KG/M2 | SYSTOLIC BLOOD PRESSURE: 113 MMHG | DIASTOLIC BLOOD PRESSURE: 46 MMHG

## 2023-11-06 DIAGNOSIS — N31.2 NEUROGENIC BLADDER, FLACCID: ICD-10-CM

## 2023-11-06 DIAGNOSIS — N40.1 BPH WITH OBSTRUCTION/LOWER URINARY TRACT SYMPTOMS: ICD-10-CM

## 2023-11-06 DIAGNOSIS — N13.8 BPH WITH OBSTRUCTION/LOWER URINARY TRACT SYMPTOMS: ICD-10-CM

## 2023-11-06 DIAGNOSIS — Z78.9 INTERMITTENT SELF-CATHETERIZATION OF BLADDER: ICD-10-CM

## 2023-11-06 DIAGNOSIS — N45.3 EPIDIDYMOORCHITIS: ICD-10-CM

## 2023-11-06 DIAGNOSIS — R33.9 URINARY RETENTION: Primary | ICD-10-CM

## 2023-11-06 DIAGNOSIS — N30.00 ACUTE CYSTITIS WITHOUT HEMATURIA: ICD-10-CM

## 2023-11-06 LAB
BACTERIA UR QL AUTO: ABNORMAL /HPF
BILIRUB BLD-MCNC: NEGATIVE MG/DL
CLARITY, POC: ABNORMAL
COLOR UR: YELLOW
EPI CELLS #/AREA URNS HPF: 0 /[HPF]
EXPIRATION DATE: ABNORMAL
GLUCOSE UR STRIP-MCNC: ABNORMAL MG/DL
KETONES UR QL: NEGATIVE
LEUKOCYTE EST, POC: ABNORMAL
Lab: ABNORMAL
NITRITE UR-MCNC: NEGATIVE MG/ML
PH UR: 6 [PH] (ref 5–8)
PROT UR STRIP-MCNC: ABNORMAL MG/DL
RBC # UR STRIP: ABNORMAL /HPF
RBC # UR STRIP: ABNORMAL /UL
RENAL EPITHELIAL, POC: 0
SP GR UR: 1.02 (ref 1–1.03)
UNIDENT CRYS URNS QL MICRO: ABNORMAL /HPF
UROBILINOGEN UR QL: ABNORMAL
WBC # UR STRIP: ABNORMAL /HPF

## 2023-11-06 PROCEDURE — 87086 URINE CULTURE/COLONY COUNT: CPT | Performed by: UROLOGY

## 2023-11-06 PROCEDURE — 87147 CULTURE TYPE IMMUNOLOGIC: CPT | Performed by: UROLOGY

## 2023-11-06 RX ORDER — CEPHALEXIN 250 MG/1
250 CAPSULE ORAL 4 TIMES DAILY
Qty: 40 CAPSULE | Refills: 0 | Status: SHIPPED | OUTPATIENT
Start: 2023-11-06 | End: 2023-11-16

## 2023-11-06 NOTE — PROGRESS NOTES
"Chief Complaint  1 Week F/U and Epididymoorchitis    Subjective  no acute distress        Preston Wallis presents to [facility]  History of Present Illness    58-year-old white male who is diabetic neurogenic bladder and was seen with left epididymoorchitis 1 week ago.  Patient was started on Keflex 250 mg 4 times a day and on the urine culture he had Streptococcus beta-hemolytic in the urine.  Patient is improving.  Less pain and swelling.  Patient has no fever or chills.   wife catheterizing 4 times a day.  No fever or chills  Objective     No acute distress  Vital Signs:   /46 (BP Location: Right arm, Patient Position: Sitting, Cuff Size: Large Adult)   Pulse 75   Ht 175.3 cm (69\")   Wt 126 kg (278 lb)   BMI 41.05 kg/m²     Allergies   Allergen Reactions    Benadryl [Diphenhydramine] Itching    Proventil [Albuterol] Other (See Comments)     Mouth sores        Past medical history:  has a past medical history of Age-related cognitive decline, Allergic contact dermatitis, Allergies, Anemia, Bronchiectasis with acute lower respiratory infection, Charcot foot due to diabetes mellitus (9/10/2013), Chronic diastolic (congestive) heart failure, Chronic kidney disease, Chronic respiratory failure with hypoxia, Closed supracondylar fracture of femur (1/12/2022), COPD (chronic obstructive pulmonary disease), Deep vein thrombosis (DVT) of lower extremity associated with air travel (1/13/2023), Dependence on supplemental oxygen, Eczema, Erectile dysfunction, Essential (primary) hypertension, Fracture, Fracture of proximal humerus (1/13/2023), GERD without esophagitis, High risk medication use, Hypercholesteremia, Hypomagnesemia, Infected stasis ulcer of left lower extremity (1/13/2023), Insomnia, Low back pain, Major depressive disorder, Morbid (severe) obesity due to excess calories, MRSA pneumonia, Muscle weakness, Non-pressure chronic ulcer of other part of unspecified foot with bone " involvement without evidence of necrosis, Obstructive sleep apnea (adult) (pediatric), Other forms of dyspnea, Other long term (current) drug therapy, Other specified noninfective gastroenteritis and colitis, Other spondylosis, lumbar region, Pain in both knees, Paroxysmal atrial fibrillation, Peripheral neuropathy, Pneumonia, unspecified organism, Polyneuropathy, Rash and other nonspecific skin eruption, Syncope and collapse, Tachycardia, Tinnitus (1/13/2023), Type 1 diabetes mellitus with diabetic chronic kidney disease, Type 2 diabetes mellitus, Unspecified fall, initial encounter, and Urinary retention.   Past surgical history:  has a past surgical history that includes Cholecystectomy; tonsillectomy and adenoidectomy; Knee surgery (Left); Other surgical history (Left); Cystoscopy; and Femur Surgery (Left).  Personal history: family history includes Asthma in his father; Cancer in his sister; Coronary artery disease in his mother; Diabetes type II in his mother and sister; Hypertension in his mother.  Social history:  reports that he quit smoking about 30 years ago. His smoking use included cigarettes. He started smoking about 42 years ago. He has a 12.00 pack-year smoking history. He has never used smokeless tobacco. He reports that he does not currently use alcohol. He reports that he does not use drugs.    Review of Systems   Please see past medical and surgical history and rest of the system is negative  Physical Exam  Constitutional:       General: He is not in acute distress.     Appearance: Normal appearance. He is obese. He is not ill-appearing or toxic-appearing.      Comments: Patient is on wheelchair and has paraparesis   HENT:      Head: Normocephalic and atraumatic.      Ears:      Comments: No loss of hearing  Abdominal:      Palpations: There is no mass.      Tenderness: There is no abdominal tenderness.   Genitourinary:     Comments: Penis is normal.    Right epididymis and testicles  normal.    Left epididymis tomorrow orchitis but markedly improved from last time.  Size now is about 6 cm time 6 cm no evidence of abscess.  Testicle is less tender than it was last time  Musculoskeletal:      Comments: Paraparesis of lower extremities   Skin:     General: Skin is warm.      Coloration: Skin is not jaundiced.   Neurological:      General: No focal deficit present.      Mental Status: He is alert. Mental status is at baseline.      Motor: Weakness present.      Gait: Gait abnormal.      Comments: Patient has paraparesis.  Cannot walk but can stand with help   Psychiatric:         Mood and Affect: Mood normal.         Behavior: Behavior normal.         Thought Content: Thought content normal.         Judgment: Judgment normal.        Result Review :                 Assessment and Plan    Diagnoses and all orders for this visit:    1. Urinary retention (Primary)  -     POC Urinalysis Dipstick, Automated  -     cephalexin (KEFLEX) 250 MG capsule; Take 1 capsule by mouth 4 (Four) Times a Day for 10 days.  Dispense: 40 capsule; Refill: 0  -     POC Urine Microscopic Only  -     Urine Culture - Urine, Urine, Clean Catch    2. Neurogenic bladder, flaccid  -     cephalexin (KEFLEX) 250 MG capsule; Take 1 capsule by mouth 4 (Four) Times a Day for 10 days.  Dispense: 40 capsule; Refill: 0  -     POC Urine Microscopic Only  -     Urine Culture - Urine, Urine, Clean Catch    3. Epididymoorchitis  -     cephalexin (KEFLEX) 250 MG capsule; Take 1 capsule by mouth 4 (Four) Times a Day for 10 days.  Dispense: 40 capsule; Refill: 0  -     POC Urine Microscopic Only  -     Urine Culture - Urine, Urine, Clean Catch    4. Intermittent self-catheterization of bladder  -     cephalexin (KEFLEX) 250 MG capsule; Take 1 capsule by mouth 4 (Four) Times a Day for 10 days.  Dispense: 40 capsule; Refill: 0  -     POC Urine Microscopic Only  -     Urine Culture - Urine, Urine, Clean Catch    5. BPH with obstruction/lower urinary  tract symptoms  -     cephalexin (KEFLEX) 250 MG capsule; Take 1 capsule by mouth 4 (Four) Times a Day for 10 days.  Dispense: 40 capsule; Refill: 0  -     POC Urine Microscopic Only  -     Urine Culture - Urine, Urine, Clean Catch    6. Acute cystitis without hematuria  -     cephalexin (KEFLEX) 250 MG capsule; Take 1 capsule by mouth 4 (Four) Times a Day for 10 days.  Dispense: 40 capsule; Refill: 0  -     POC Urine Microscopic Only  -     Urine Culture - Urine, Urine, Clean Catch    I am going to continue Keflex to 50 mg 4 times a day for another 10 days and recheck him in 2 weeks time.  Patient is asked to use a lot of ice on the scrotum and also have a tight underwear.  Urine culture is repeated to make sure that he is on the right antibiotic.     Brief Urine Lab Results  (Last result in the past 365 days)        Color   Clarity   Blood   Leuk Est   Nitrite   Protein   CREAT   Urine HCG        11/06/23 1130 Yellow   Cloudy   Small   Small (1+)   Negative   100 mg/dL                    Follow Up   No follow-ups on file.  Patient was given instructions and counseling regarding his condition or for health maintenance advice. Please see specific information pulled into the AVS if appropriate.     Teresita White MD

## 2023-11-07 LAB — BACTERIA SPEC AEROBE CULT: ABNORMAL

## 2023-11-08 DIAGNOSIS — E11.40 TYPE 2 DIABETES MELLITUS WITH DIABETIC NEUROPATHY, WITH LONG-TERM CURRENT USE OF INSULIN: ICD-10-CM

## 2023-11-08 DIAGNOSIS — Z79.4 TYPE 2 DIABETES MELLITUS WITH DIABETIC NEUROPATHY, WITH LONG-TERM CURRENT USE OF INSULIN: ICD-10-CM

## 2023-11-08 DIAGNOSIS — E11.40 POORLY CONTROLLED TYPE 2 DIABETES MELLITUS WITH NEUROPATHY: ICD-10-CM

## 2023-11-08 DIAGNOSIS — E11.65 POORLY CONTROLLED TYPE 2 DIABETES MELLITUS WITH NEUROPATHY: ICD-10-CM

## 2023-11-08 DIAGNOSIS — E11.65 UNCONTROLLED TYPE 2 DIABETES MELLITUS WITH HYPERGLYCEMIA: ICD-10-CM

## 2023-11-09 RX ORDER — INSULIN LISPRO 100 [IU]/ML
INJECTION, SOLUTION INTRAVENOUS; SUBCUTANEOUS
Qty: 15 ML | Refills: 1 | Status: SHIPPED | OUTPATIENT
Start: 2023-11-09

## 2023-11-09 NOTE — PROGRESS NOTES
Chief Complaint  Diabetes (Follow up, med mgt, A1c eval, cgm eval )    Referred By: No ref. provider found    Subjective          Preston Wallis presents to [facility] for diabetes medication management    History of Present Illness    Visit type:  follow-up  Diabetes type:  Type 2  Current diabetes status/concerns/issues:  No specific concerns with his diabetes today  Other health concerns: He is on an antibiotic due to left epididymoorchitis + UTI  Current Diabetes symptoms:    Polyuria: No   Polydipsia: No   Polyphagia: No   Blurred vision: No   Excessive fatigue: No  Known Diabetes complications:  Neuropathy: Numbness, Tingling, Shooting Pain and Temperature variation (hot or cold sensations); Location: Feet  Renal: Stage IIIa moderate (GFR = 45-59 mL/min)  Eyes: Mild Nonproliferative Retinopathy and Without Macular Edema; Location: Bilateral  Amputation/Wounds: Healed wound on the right foot  GI: None  Cardiovascular: Hypertension, Hyperlipidemia and CHF  ED: None  Other: Neurogenic bladder  Hypoglycemia:  None reported at this time  Hypoglycemia Symptoms:  No hypoglycemia at this time  Current diabetes treatment:  Byrureon 2 mg once weekly, Farxiga 5 mg once a day, Lantus 25 units twice a day and Humalog using 5 to 10 units before each meal plus sliding scale as follows:  IF BLOOD SUGAR < 160= 0 units, 161-220= 2 units, 221-280= 4 units, 281-340 = 6 units, 341-400 = 8 units    Blood glucose device:  [employer] CGM  Blood glucose monitoring frequency:  Continuous per CGM  Blood glucose range/average:  The 14-day sensor report shows an average glucose of 229 mg/dL, with 45% in target range ( mgdL), 16% in the high range (181-250 mg/dL), 39% in the very high range (>250 mg/dL), 0% in the low range (54-70 mg/dL) and 0% in the very low range (<54 mg/dL).   Glucose Source: Device Reviewed  Diet:  Limits high carb/sweet foods, Avoids sugary drinks  Activity/Exercise:  None    Past  Medical History:   Diagnosis Date    Age-related cognitive decline     Allergic contact dermatitis     Allergies     Anemia     Bronchiectasis with acute lower respiratory infection     Charcot foot due to diabetes mellitus 9/10/2013    Chronic diastolic (congestive) heart failure     Chronic kidney disease     Chronic respiratory failure with hypoxia     Closed supracondylar fracture of femur 1/12/2022    COPD (chronic obstructive pulmonary disease)     Deep vein thrombosis (DVT) of lower extremity associated with air travel 1/13/2023    Dependence on supplemental oxygen     Eczema     Erectile dysfunction     due to organic reasons    Essential (primary) hypertension     Fracture     closed fracture of other tarsal and metatarsal bones    Fracture of proximal humerus 1/13/2023    GERD without esophagitis     High risk medication use     Hypercholesteremia     Hypomagnesemia     Infected stasis ulcer of left lower extremity 1/13/2023    Insomnia     Low back pain     Major depressive disorder     Morbid (severe) obesity due to excess calories     MRSA pneumonia     Muscle weakness     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 without evidence of necrosis     Obstructive sleep apnea (adult) (pediatric)     Other forms of dyspnea     Other long term (current) drug therapy     Other specified noninfective gastroenteritis and colitis     Other spondylosis, lumbar region     Pain in both knees     Paroxysmal atrial fibrillation     Peripheral neuropathy     attributed to type 2 diabetes    Pneumonia, unspecified organism     Polyneuropathy     Rash and other nonspecific skin eruption     Syncope and collapse     Tachycardia     Tinnitus 1/13/2023    Type 1 diabetes mellitus with diabetic chronic kidney disease     Type 2 diabetes mellitus     Unspecified fall, initial encounter     Urinary retention      Past Surgical History:   Procedure Laterality Date    CHOLECYSTECTOMY       CYSTOSCOPY      FEMUR SURGERY Left     Shravan placed    KNEE SURGERY Left     OTHER SURGICAL HISTORY Left     venous port    TONSILLECTOMY AND ADENOIDECTOMY       Family History   Problem Relation Age of Onset    Coronary artery disease Mother     Hypertension Mother     Diabetes type II Mother     Asthma Father     Diabetes type II Sister     Cancer Sister      Social History     Socioeconomic History    Marital status:    Tobacco Use    Smoking status: Former     Packs/day: 1.00     Years: 12.00     Additional pack years: 0.00     Total pack years: 12.00     Types: Cigarettes     Start date:      Quit date:      Years since quittin.8    Smokeless tobacco: Never   Vaping Use    Vaping Use: Never used   Substance and Sexual Activity    Alcohol use: Not Currently    Drug use: Never    Sexual activity: Defer     Allergies   Allergen Reactions    Benadryl [Diphenhydramine] Itching    Proventil [Albuterol] Other (See Comments)     Mouth sores         Current Outpatient Medications:     apixaban (Eliquis) 5 MG tablet tablet, Take 1 tablet by mouth Every 12 (Twelve) Hours., Disp: 180 tablet, Rfl: 2    atorvastatin (LIPITOR) 20 MG tablet, Take 1 tablet by mouth Every Night., Disp: 90 tablet, Rfl: 2    B-D UF III MINI PEN NEEDLES 31G X 5 MM misc, USE FOUR TIMES DAILY BEFORE MEALS AND AT BEDTIME, Disp: 360 each, Rfl: 1    Budeson-Glycopyrrol-Formoterol (Breztri Aerosphere) 160-9-4.8 MCG/ACT aerosol inhaler, , Disp: , Rfl:     bumetanide (BUMEX) 2 MG tablet, Take 1 tablet BY MOUTH TWICE DAILY. call cardiology IF weight is greater THAN 2 pounds in 1 DAY OR 5 pounds in A WEEK., Disp: 180 tablet, Rfl: 2    calcium citrate (CALCITRATE) 950 (200 Ca) MG tablet, Take 1 tablet by mouth Daily., Disp: 90 tablet, Rfl: 2    cephalexin (KEFLEX) 250 MG capsule, Take 1 capsule by mouth 4 (Four) Times a Day for 10 days., Disp: 40 capsule, Rfl: 0    Cholecalciferol (Vitamin D3) 50 MCG (2000 UT) tablet, Take 1 tablet by mouth  Daily., Disp: 90 tablet, Rfl: 2    Continuous Blood Gluc  (FreeStyle Bethany 2 Conway) device, , Disp: , Rfl:     Continuous Blood Gluc Sensor (FreeStyle Bethany 2 Sensor) misc, USE every 14 DAYS, Disp: 2 each, Rfl: 11    dapagliflozin (Farxiga) 5 MG tablet tablet, Take 1 tablet by mouth Daily., Disp: 90 tablet, Rfl: 2    docusate sodium (COLACE) 100 MG capsule, Take 1 capsule by mouth Daily., Disp: 90 capsule, Rfl: 2    DULoxetine (CYMBALTA) 60 MG capsule, Take 1 capsule by mouth 2 (Two) Times a Day., Disp: 180 capsule, Rfl: 2    famotidine (PEPCID) 40 MG tablet, Take 1 tablet by mouth Every Morning., Disp: 90 tablet, Rfl: 2    ferrous gluconate (FERGON) 324 MG tablet, Take 1 tablet by mouth Daily With Breakfast., Disp: 90 tablet, Rfl: 2    finasteride (PROSCAR) 5 MG tablet, Take 1 tablet by mouth Daily., Disp: 90 tablet, Rfl: 2    folic acid (FOLVITE) 1 MG tablet, Take 1 tablet by mouth Daily., Disp: 90 tablet, Rfl: 2    gabapentin (NEURONTIN) 300 MG capsule, TAKE ONE CAPSULE BY MOUTH EVERY MORNING AND TAKE TWO CAPSULES BY MOUTH EVERY NIGHT AT BEDTIME, Disp: 270 capsule, Rfl: 2    glucose blood test strip, 1 each by Other route Daily. Dx:   E11.40, Disp: 100 each, Rfl: 4    Insulin Lispro, 1 Unit Dial, (HUMALOG) 100 UNIT/ML solution pen-injector, inject EIGHT units UNDER THE SKIN INTO THE APPROPRIATE AREA THREE TIMES DAILY PER sliding scale, IF BLOOD SUGAR < 160= 0 units, 161-220= 2 units, 221-280= 4 units, 281-340 = SIX units, 341-400 = EIGHT units, Disp: 15 mL, Rfl: 1    Lantus SoloStar 100 UNIT/ML injection pen, INJECT 40 UNITS UNDER THE SKIN ONCE DAILY, Disp: 15 mL, Rfl: 1    losartan (COZAAR) 25 MG tablet, Take 1 tablet by mouth Every Morning., Disp: , Rfl:     metoprolol tartrate (LOPRESSOR) 100 MG tablet, Take 1 tablet by mouth 2 (Two) Times a Day., Disp: 189 tablet, Rfl: 2    montelukast (SINGULAIR) 10 MG tablet, Take 1 tablet by mouth every night at bedtime., Disp: 90 tablet, Rfl: 2    NIFEdipine XL  "(PROCARDIA XL) 30 MG 24 hr tablet, Take 1 tablet by mouth Every Morning., Disp: 90 tablet, Rfl: 2    O2 (OXYGEN), 2 Liter O2 - CONTINUOUS (route: Oxygen), Disp: , Rfl:     ondansetron ODT (ZOFRAN-ODT) 4 MG disintegrating tablet, Place 1 tablet on the tongue Every 8 (Eight) Hours As Needed for Nausea or Vomiting., Disp: 10 tablet, Rfl: 0    TRUEplus Lancets 28G misc, USE AS DIRECTED, Disp: 100 each, Rfl: 0    Ventolin  (90 Base) MCG/ACT inhaler, INHALE 2 PUFFS FOUR TIMES DAILY AS NEEDED FOR WHEEZING, Disp: 18 g, Rfl: 11    Semaglutide, 1 MG/DOSE, (Ozempic, 1 MG/DOSE,) 4 MG/3ML solution pen-injector, Inject 1 mg under the skin into the appropriate area as directed 1 (One) Time Per Week., Disp: 3 mL, Rfl: 5    Objective     Vitals:    11/10/23 0900   BP: 131/60   BP Location: Left arm   Patient Position: Sitting   Cuff Size: Adult   Pulse: 73   SpO2: 96%   Height: 175.3 cm (69\")   PainSc:   7     Body mass index is 41.05 kg/m².    Physical Exam  Constitutional:       Appearance: Normal appearance. He is obese.      Comments: Severe Obesity (BMI >= 40) Pt Current BMI = 41.05    HENT:      Head: Normocephalic and atraumatic.      Right Ear: External ear normal.      Left Ear: External ear normal.      Nose: Nose normal.   Eyes:      Extraocular Movements: Extraocular movements intact.      Conjunctiva/sclera: Conjunctivae normal.   Pulmonary:      Effort: Pulmonary effort is normal.   Musculoskeletal:         General: Normal range of motion.      Cervical back: Normal range of motion.   Skin:     General: Skin is warm and dry.   Neurological:      General: No focal deficit present.      Mental Status: He is alert and oriented to person, place, and time. Mental status is at baseline.   Psychiatric:         Mood and Affect: Mood normal.         Behavior: Behavior normal.         Thought Content: Thought content normal.         Judgment: Judgment normal.             Result Review :   The following data was reviewed " by: CON Ashley on 11/10/2023:    Most Recent A1C          11/10/2023    09:02   HGBA1C Most Recent   Hemoglobin A1C 8.7        A1C Last 3 Results          4/28/2023    08:28 8/22/2023    11:11 11/10/2023    09:02   HGBA1C Last 3 Results   Hemoglobin A1C 8.9  8.0  8.7      A1c collected in the office today is 8.7%, indicating Uncontrolled Type II diabetes.  This result is up from the prior result of 8.0% collected on 8/22/23       Total Cholesterol   Date Value Ref Range Status   09/07/2023 130 0 - 200 mg/dL Final     Triglycerides   Date Value Ref Range Status   09/07/2023 205 (H) 0 - 150 mg/dL Final     HDL Cholesterol   Date Value Ref Range Status   09/07/2023 48 40 - 60 mg/dL Final     LDL Cholesterol    Date Value Ref Range Status   09/07/2023 49 0 - 100 mg/dL Final     Lipid panel collected on 9/7/23 shows Hypertriglyceridemia            Assessment: He has had an increase in his A1c since last evaluation.  He admits he is not using his mealtime insulin routinely.  He does not take the insulin with him when he leaves his house.  He was under the impression that insulin had to remain refrigerated.  We discussed proper storage and use of insulin.  Patient is also on Bydureon and has been on this for an extended period of time.  It may be less effective than alternatives.      Diagnoses and all orders for this visit:    1. Uncontrolled type 2 diabetes mellitus with hyperglycemia (Primary)  -     POC Glycosylated Hemoglobin (Hb A1C)  -     Semaglutide, 1 MG/DOSE, (Ozempic, 1 MG/DOSE,) 4 MG/3ML solution pen-injector; Inject 1 mg under the skin into the appropriate area as directed 1 (One) Time Per Week.  Dispense: 3 mL; Refill: 5    2. Type 2 diabetes mellitus with diabetic neuropathy, with long-term current use of insulin    3. Type 2 diabetes mellitus with stage 3a chronic kidney disease, with long-term current use of insulin    4. Severe obesity (BMI >= 40)    5. Uses self-applied continuous glucose  monitoring device        Plan: We will try switching the patient to using Ozempic 1 mg once weekly and discontinue the Bydureon.  We will adjust the dose as needed.  In the meantime the patient is to focus on taking his mealtime insulin.  He is encouraged to take the insulin with him when he anticipates being away from the home.  He is to also focus on dietary strategies to control glucose levels and to help with weight loss.    The patient will monitor his blood glucose levels using his continuous glucose sensor.  If he develops problematic hyperglycemia or hypoglycemia or adverse drug reactions, he will contact the office for further instructions.        Follow Up     Return in about 3 months (around 2/10/2024) for CGM Follow-up.    Patient was given instructions and counseling regarding his condition or for health maintenance advice. Please see specific information pulled into the AVS if appropriate.     Neli Paredes, CON  11/10/2023      Dictated Utilizing Dragon Dictation.  Please note that portions of this note were completed with a voice recognition program.  Part of this note may be an electronic transcription/translation of spoken language to printed text using the Dragon Dictation System.

## 2023-11-10 ENCOUNTER — OFFICE VISIT (OUTPATIENT)
Dept: DIABETES SERVICES | Facility: CLINIC | Age: 58
End: 2023-11-10
Payer: COMMERCIAL

## 2023-11-10 VITALS
SYSTOLIC BLOOD PRESSURE: 131 MMHG | HEIGHT: 69 IN | DIASTOLIC BLOOD PRESSURE: 60 MMHG | OXYGEN SATURATION: 96 % | HEART RATE: 73 BPM | BODY MASS INDEX: 41.05 KG/M2

## 2023-11-10 DIAGNOSIS — E11.40 TYPE 2 DIABETES MELLITUS WITH DIABETIC NEUROPATHY, WITH LONG-TERM CURRENT USE OF INSULIN: ICD-10-CM

## 2023-11-10 DIAGNOSIS — Z79.4 TYPE 2 DIABETES MELLITUS WITH STAGE 3A CHRONIC KIDNEY DISEASE, WITH LONG-TERM CURRENT USE OF INSULIN: ICD-10-CM

## 2023-11-10 DIAGNOSIS — Z79.4 TYPE 2 DIABETES MELLITUS WITH DIABETIC NEUROPATHY, WITH LONG-TERM CURRENT USE OF INSULIN: ICD-10-CM

## 2023-11-10 DIAGNOSIS — E66.01 SEVERE OBESITY (BMI >= 40): ICD-10-CM

## 2023-11-10 DIAGNOSIS — E11.22 TYPE 2 DIABETES MELLITUS WITH STAGE 3A CHRONIC KIDNEY DISEASE, WITH LONG-TERM CURRENT USE OF INSULIN: ICD-10-CM

## 2023-11-10 DIAGNOSIS — N18.31 TYPE 2 DIABETES MELLITUS WITH STAGE 3A CHRONIC KIDNEY DISEASE, WITH LONG-TERM CURRENT USE OF INSULIN: ICD-10-CM

## 2023-11-10 DIAGNOSIS — E11.65 UNCONTROLLED TYPE 2 DIABETES MELLITUS WITH HYPERGLYCEMIA: Primary | ICD-10-CM

## 2023-11-10 DIAGNOSIS — Z97.8 USES SELF-APPLIED CONTINUOUS GLUCOSE MONITORING DEVICE: ICD-10-CM

## 2023-11-10 LAB
EXPIRATION DATE: ABNORMAL
HBA1C MFR BLD: 8.7 % (ref 4.5–5.7)
Lab: ABNORMAL

## 2023-11-10 PROCEDURE — 1160F RVW MEDS BY RX/DR IN RCRD: CPT | Performed by: NURSE PRACTITIONER

## 2023-11-10 PROCEDURE — 3052F HG A1C>EQUAL 8.0%<EQUAL 9.0%: CPT | Performed by: NURSE PRACTITIONER

## 2023-11-10 PROCEDURE — 3075F SYST BP GE 130 - 139MM HG: CPT | Performed by: NURSE PRACTITIONER

## 2023-11-10 PROCEDURE — 99214 OFFICE O/P EST MOD 30 MIN: CPT | Performed by: NURSE PRACTITIONER

## 2023-11-10 PROCEDURE — 1159F MED LIST DOCD IN RCRD: CPT | Performed by: NURSE PRACTITIONER

## 2023-11-10 PROCEDURE — 3078F DIAST BP <80 MM HG: CPT | Performed by: NURSE PRACTITIONER

## 2023-11-10 PROCEDURE — 95251 CONT GLUC MNTR ANALYSIS I&R: CPT | Performed by: NURSE PRACTITIONER

## 2023-11-10 RX ORDER — SEMAGLUTIDE 1.34 MG/ML
1 INJECTION, SOLUTION SUBCUTANEOUS WEEKLY
Qty: 3 ML | Refills: 5 | Status: SHIPPED | OUTPATIENT
Start: 2023-11-10

## 2023-11-10 NOTE — PATIENT INSTRUCTIONS
When it becomes available begin taking Ozempic 1 mg once weekly and discontinue the use of the Bydureon    Continue taking the long-acting insulin (Lantus) 25 units twice a day    Focus on taking your mealtime insulin (Humalog) 5 to 10 units before each meal plus the correction scale listed below:    IF BLOOD SUGAR < 160= 0 units,   161-220= 2 units,   221-280= 4 units,   281-340 = 6 units,   341-400 = 8 units

## 2023-11-13 ENCOUNTER — TELEPHONE (OUTPATIENT)
Dept: DIABETES SERVICES | Facility: HOSPITAL | Age: 58
End: 2023-11-13
Payer: COMMERCIAL

## 2023-11-14 ENCOUNTER — OFFICE VISIT (OUTPATIENT)
Dept: FAMILY MEDICINE CLINIC | Age: 58
End: 2023-11-14
Payer: COMMERCIAL

## 2023-11-14 VITALS
DIASTOLIC BLOOD PRESSURE: 69 MMHG | BODY MASS INDEX: 41.03 KG/M2 | OXYGEN SATURATION: 94 % | HEART RATE: 76 BPM | SYSTOLIC BLOOD PRESSURE: 111 MMHG | TEMPERATURE: 97.6 F | HEIGHT: 69 IN

## 2023-11-14 DIAGNOSIS — I48.0 PAROXYSMAL ATRIAL FIBRILLATION: ICD-10-CM

## 2023-11-14 DIAGNOSIS — G62.9 PERIPHERAL POLYNEUROPATHY: ICD-10-CM

## 2023-11-14 DIAGNOSIS — F22 DELUSION: ICD-10-CM

## 2023-11-14 DIAGNOSIS — Z23 ENCOUNTER FOR IMMUNIZATION: ICD-10-CM

## 2023-11-14 DIAGNOSIS — I10 ESSENTIAL (PRIMARY) HYPERTENSION: ICD-10-CM

## 2023-11-14 DIAGNOSIS — Z79.4 TYPE 2 DIABETES MELLITUS WITH DIABETIC NEUROPATHY, WITH LONG-TERM CURRENT USE OF INSULIN: Primary | ICD-10-CM

## 2023-11-14 DIAGNOSIS — E11.40 TYPE 2 DIABETES MELLITUS WITH DIABETIC NEUROPATHY, WITH LONG-TERM CURRENT USE OF INSULIN: Primary | ICD-10-CM

## 2023-11-14 DIAGNOSIS — N18.32 STAGE 3B CHRONIC KIDNEY DISEASE (CKD): ICD-10-CM

## 2023-11-14 DIAGNOSIS — N40.1 BENIGN PROSTATIC HYPERPLASIA WITH URINARY RETENTION: ICD-10-CM

## 2023-11-14 DIAGNOSIS — N30.00 ACUTE CYSTITIS WITHOUT HEMATURIA: ICD-10-CM

## 2023-11-14 DIAGNOSIS — E55.9 VITAMIN D DEFICIENCY: ICD-10-CM

## 2023-11-14 DIAGNOSIS — L03.116 CELLULITIS OF LEFT LEG: ICD-10-CM

## 2023-11-14 DIAGNOSIS — G47.33 OBSTRUCTIVE SLEEP APNEA SYNDROME: ICD-10-CM

## 2023-11-14 DIAGNOSIS — R33.8 BENIGN PROSTATIC HYPERPLASIA WITH URINARY RETENTION: ICD-10-CM

## 2023-11-14 DIAGNOSIS — D50.8 IRON DEFICIENCY ANEMIA SECONDARY TO INADEQUATE DIETARY IRON INTAKE: ICD-10-CM

## 2023-11-14 DIAGNOSIS — K59.09 OTHER CONSTIPATION: ICD-10-CM

## 2023-11-14 DIAGNOSIS — K21.9 GASTROESOPHAGEAL REFLUX DISEASE WITHOUT ESOPHAGITIS: ICD-10-CM

## 2023-11-14 DIAGNOSIS — E13.319 RETINOPATHY DUE TO SECONDARY DIABETES MELLITUS: ICD-10-CM

## 2023-11-14 DIAGNOSIS — J47.9 BRONCHIECTASIS WITHOUT COMPLICATION: ICD-10-CM

## 2023-11-14 NOTE — PROGRESS NOTES
Preston Wallis presents to [facility].    Chief Complaint:  diabetes and bp follow up    Subjective     History of Present Illness:  Diabetes  Pertinent negatives for hypoglycemia include no dizziness. Pertinent negatives for diabetes include no chest pain and no fatigue.   Gómez is following up for his diabetes, he is seeing Neli Paredes and has recently been changed from bydurion to Ozempic, he is also on long-acting Lantus insulin and Farxiga.  He recently picked up the Ozempic and should be starting this medication soon and stopping the bydurion.  His diabetes is improving with endocrinology's help.  He is not checking blood sugars at home like he should be.  He is not good at following a low carbohydrate diet and is pretty noncompliant overall.  Since our nurse navigator has been involved in helping with his medication and his overall health for the past year has been almost 100% better with a lot less hospitalizations.  The biggest obstacle we have is getting Gómez to be compliant with this medication.     Gómez sees cardiology for congestive heart failure with preserved ejection fraction, diastolic dysfunction (in addition he has chronic lower extremity edema-stable and well-controlled with Bumex), paroxysmal atrial fibrillation.  He continues to be stable on current meds including Bumex, Imdur, losartan, Procardia, Eliquis and metoprolol. .He is to avoid salt in his diet.    Denies CP.  He has chronical LE edema which is is mild. Cardiologist is Dr. Ware,     He has chronic iron deficiency anemia and sees hematology Dr. Hanson.  He had 2 iron infusions in past, tolerates ferrous gluconate, on stool softener Colace 100 mg daily for for constipation and he reports his bowel movements have been harder recently.  History of colonoscopy and EGD are UTD, showed 1 -8 mm polyp and gastritis, done by Dr Engel     He presents with essential (primary) hypertension, current cardiac  medication regimen includes a diuretic (bumex ), a beta-blocker ( Metoprolol 100 mg twice daily ), ARB (losartan), IMDUR, CCB (procardia).  Compliance has been good with pill packs.  He is tolerating the medication well without side effects.  He has not kept a blood pressure diary.  Blood pressure today is low, he  denies dizziness.  I will decrease his metoprolol to 50 mg twice daily     History of recurrent pseudomonas pneumonia in past, sees Dr Chavez and is on intermittent tobramycin nebulizer.  He is also on albuterol/duo nebs and symbicort daily, albuterol nebulizers. He has no acute issues today and is tolerating meds well    He has h/o PE and is on eliquis and is stable on medication.      He has chronic PN pain and RLS at night, he is overall stable and doing well on the gabapentin, I discussed with him trying to wean him off this medication to help decrease number of meds he is on and he states he has not tolerated wean off medication in the past and when he misses a pill he is in moderate to severe pain and does not want to try to wean off the medication at this time.  Today's is F2 F.     He also suffers from chronic L knee pain, his pain is severe, he cant stand or balance due to pain, he is under care with Dr. Arti oropeza for knee replacement.      He is on positive pressure CPAP for obstructive sleep apnea at home and he is mostly compliant and tolerates well    Frequent UTIs and he is now seeing urology.  He also has stage IIIb kidney insufficiency and sees nephrology.  He was recently diagnosed with another UTI and is currently on Keflex 250 mg 3 times daily for treatment x10 days.  In addition he scraped his leg the other day and has a mild case of cellulitis in the left shin.  Keflex should help.    Gómez is experiencing intermittent hallucinations.  The other day when he went to his urologist he thought I saw a kid behind the car in the parking lot.  None of his hallucinations have been  dangerous.  At the time of this current 1 he was diagnosed with a UTI and treated with Keflex and has not had issues since the treatment.  Gómez believes though that he has had hallucinations for a long time prior to this latest 1 with a UTI.      Result Review   The following data was reviewed by Kimmy Riley MD on 11/14/2023.  Lab Results   Component Value Date    WBC 11.98 (H) 08/10/2023    HGB 11.4 (L) 08/10/2023    HCT 37.7 08/10/2023    MCV 90.6 08/10/2023     08/10/2023     Lab Results   Component Value Date    GLUCOSE 124 (H) 08/10/2023    BUN 24 (H) 08/10/2023    CREATININE 1.57 (H) 08/10/2023    EGFR 50.8 (L) 08/10/2023    BCR 15.3 08/10/2023    K 4.5 08/10/2023    CO2 36.2 (H) 08/10/2023    CALCIUM 9.2 08/10/2023    PROTENTOTREF 7.1 08/22/2022    ALBUMIN 3.6 05/25/2023    BILITOT <0.2 06/22/2022    AST 19 06/22/2022    ALT 20 06/22/2022     Lab Results   Component Value Date    CHOL 130 09/07/2023    CHLPL 165 08/26/2020    TRIG 205 (H) 09/07/2023    HDL 48 09/07/2023    LDL 49 09/07/2023     Lab Results   Component Value Date    TSH 1.517 08/30/2023     Lab Results   Component Value Date    HGBA1C 8.7 (A) 11/10/2023     Lab Results   Component Value Date    PSA 0.203 05/25/2023    PSA 0.725 03/17/2022     Lab Results   Component Value Date    IRON 66 05/25/2023      Lab Results   Component Value Date    IGEC06AZ 25.5 (L) 05/25/2023               Assessment and Plan:   Diagnoses and all orders for this visit:    1. Type 2 diabetes mellitus with diabetic neuropathy, with long-term current use of insulin (Primary)  Comments:  newly on ozempic not started yet, continue long-acting Lantus and Farxiga, recommend taking his SSI and he defers, sees Neli Paredes    2. Peripheral polyneuropathy  Comments:  Stable/well-controlled on gabapentin.I d/w him weaning him down this med and he did not althea weaning off the med must have it to be functional and pain-free    3. Benign prostatic hyperplasia with  urinary retention  Comments:  He is self cathing and stable.  Recently treated for UTI by his urologist and the urine is clear    4. Essential (primary) hypertension  Comments:  BP is stable and well-controlled on current meds.  No changes needed current meds or Tx plan    5. Gastroesophageal reflux disease without esophagitis  Comments:  GERD is stable    6. Stage 3b chronic kidney disease (CKD)  Comments:  He is to avoid NSAIDs and push fluids 64 ounces a day.  Labs reviewed and stable    7. Vitamin D deficiency  Comments:  Continue vitamin D supplementation    8. Paroxysmal atrial fibrillation  Comments:  NSR today.  Follow-up with cardiology as directed and continue metoprolol and Eliquis.  No SI/SX GI bleeding or excessive bruising    9. Iron deficiency anemia secondary to inadequate dietary iron intake  Comments:  Continue iron supplementation.  Increase DOST to 200 mg daily due to constipation from iron.    10. Acute cystitis without hematuria  Comments:  under tx with urology, on keflex    11. 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 Bronchiectasis without complication    13. Encounter for immunization  Comments:  Given Prevnar 20 and flu vaccination today  Orders:  -     Fluzone >6 Months ([id number])  -     Pneumococcal Conjugate Vaccine 20-Valent (PCV20)    14. Obstructive sleep apnea syndrome  Comments:  he is not faithful with his cpap, I educated him again on need to use his cpap at night    15. Other constipation  Comments:  Increase Colace to 200 mg a day and okay to add MiraLAX 17 g with 8 ounces of water every 3 days no BM    16. Delusion  Comments:  History of delusions, these are intermittent, recent dx of UTI and he is under treatment.    17. Cellulitis of left leg  Comments:  on kelflex for UTI, will cont abx tx, his wife will help monitor him picture placed in his chart              Objective     Medications:  Current Outpatient Medications   Medication Instructions    apixaban  (ELIQUIS) 5 mg, Oral, Every 12 Hours    atorvastatin (LIPITOR) 20 mg, Oral, Nightly    B-D UF III MINI PEN NEEDLES 31G X 5 MM misc USE FOUR TIMES DAILY BEFORE MEALS AND AT BEDTIME    Budeson-Glycopyrrol-Formoterol (Breztri Aerosphere) 160-9-4.8 MCG/ACT aerosol inhaler No dose, route, or frequency recorded.    bumetanide (BUMEX) 2 MG tablet Take 1 tablet BY MOUTH TWICE DAILY. call cardiology IF weight is greater THAN 2 pounds in 1 DAY OR 5 pounds in A WEEK.    calcium citrate (CALCITRATE) 950 mg, Oral, Daily    cephalexin (KEFLEX) 250 mg, Oral, 4 Times Daily    Continuous Blood Gluc  (FreeStyle [place] 2 [place]) device No dose, route, or frequency recorded.    Continuous Blood Gluc Sensor (FreeStyle [place] 2 Sensor) misc USE every 14 DAYS    docusate sodium (COLACE) 100 mg, Oral, Daily    DULoxetine (CYMBALTA) 60 mg, Oral, 2 Times Daily    famotidine (PEPCID) 40 mg, Oral, Every Morning    Farxiga 5 mg, Oral, Daily    ferrous gluconate (FERGON) 324 mg, Oral, Daily With Breakfast    finasteride (PROSCAR) 5 mg, Oral, Daily    folic acid (FOLVITE) 1 mg, Oral, Daily    gabapentin (NEURONTIN) 300 MG capsule TAKE ONE CAPSULE BY MOUTH EVERY MORNING AND TAKE TWO CAPSULES BY MOUTH EVERY NIGHT AT BEDTIME    glucose blood test strip 1 each, Other, Daily, Dx:   E11.40    Insulin Lispro, 1 Unit Dial, (HUMALOG) 100 UNIT/ML solution pen-injector inject EIGHT units UNDER THE SKIN INTO THE APPROPRIATE AREA THREE TIMES DAILY PER sliding scale, IF BLOOD SUGAR < 160= 0 units, 161-220= 2 units, 221-280= 4 units, 281-340 = SIX units, 341-400 = EIGHT units    Lantus SoloStar 100 UNIT/ML injection pen INJECT 40 UNITS UNDER THE SKIN ONCE DAILY    losartan (COZAAR) 25 mg, Oral, Every Morning    metoprolol tartrate (LOPRESSOR) 100 mg, Oral, 2 Times Daily    montelukast (SINGULAIR) 10 mg, Oral, Every Night at Bedtime    NIFEdipine XL (PROCARDIA XL) 30 mg, Oral, Every Morning    O2 (OXYGEN) 2 Liter O2 - CONTINUOUS (route: Oxygen)     "ondansetron ODT (ZOFRAN-ODT) 4 mg, Translingual, Every 8 Hours PRN    Ozempic (1 MG/DOSE) 1 mg, Subcutaneous, Weekly    TRUEplus Lancets 28G misc USE AS DIRECTED    Ventolin  (90 Base) MCG/ACT inhaler INHALE 2 PUFFS FOUR TIMES DAILY AS NEEDED FOR WHEEZING    Vitamin D3 50 mcg, Oral, Daily        Vital Signs:   /69 (BP Location: Left arm, Patient Position: Sitting)   Pulse 76   Temp 97.6 °F (36.4 °C) (Oral)   Ht 175.3 cm (69.02\")   SpO2 94% Comment: 2 liters of O2  BMI 41.03 kg/m²             Physical Exam:  Physical Exam  Vitals and nursing note reviewed.   Constitutional:       General: He is not in acute distress.     Appearance: Normal appearance. He is obese. He is not ill-appearing, toxic-appearing or diaphoretic.   HENT:      Head: Normocephalic and atraumatic.      Right Ear: Tympanic membrane, ear canal and external ear normal.      Left Ear: Tympanic membrane, ear canal and external ear normal.      Nose: No congestion or rhinorrhea.      Mouth/Throat:      Mouth: Mucous membranes are moist.      Pharynx: Oropharynx is clear. No oropharyngeal exudate or posterior oropharyngeal erythema.   Eyes:      Extraocular Movements: Extraocular movements intact.      Conjunctiva/sclera: Conjunctivae normal.      Pupils: Pupils are equal, round, and reactive to light.   Cardiovascular:      Rate and Rhythm: Normal rate and regular rhythm.      Heart sounds: Normal heart sounds.   Pulmonary:      Effort: Pulmonary effort is normal.      Breath sounds: Normal breath sounds. No wheezing, rhonchi or rales.   Abdominal:      General: Abdomen is flat.      Palpations: Abdomen is soft. There is no mass.      Tenderness: There is no abdominal tenderness.      Hernia: No hernia is present.   Musculoskeletal:      Cervical back: Neck supple. No rigidity.      Right lower le+ Edema present.      Left lower le+ Edema present.   Lymphadenopathy:      Cervical: No cervical adenopathy.   Skin:     General: " Skin is warm and dry.          Neurological:      General: No focal deficit present.      Mental Status: He is alert and oriented to person, place, and time. Mental status is at baseline.   Psychiatric:         Mood and Affect: Mood normal.         Behavior: Behavior normal.         Thought Content: Thought content normal.         Judgment: Judgment normal.           Review of Systems:  Review of Systems   Constitutional:  Negative for chills, fatigue and fever.   HENT:  Negative for congestion, ear discharge and sore throat.    Respiratory:  Positive for wheezing. Negative for cough and shortness of breath.    Cardiovascular:  Positive for leg swelling. Negative for chest pain and palpitations.   Gastrointestinal:  Positive for constipation. Negative for abdominal pain, diarrhea, nausea, vomiting and GERD.   Genitourinary:  Negative for flank pain.   Skin:  Positive for rash.   Neurological:  Negative for dizziness and headache.   Psychiatric/Behavioral:  Positive for sleep disturbance.               Follow Up   Return if symptoms worsen or fail to improve.    Part of this note may be an electronic transcription/translation of spoken language to printed   text using the Dragon Dictation System.            Medical History:  There are no discontinued medications.   Past Medical History:    Age-related cognitive decline    Allergic contact dermatitis    Allergies    Anemia    Bronchiectasis with acute lower respiratory infection    Charcot foot due to diabetes mellitus    Chronic diastolic (congestive) heart failure    Chronic kidney disease    Chronic respiratory failure with hypoxia    Closed supracondylar fracture of femur    COPD (chronic obstructive pulmonary disease)    Deep vein thrombosis (DVT) of lower extremity associated with air travel    Dependence on supplemental oxygen    Eczema    Erectile dysfunction    due to organic reasons    Essential (primary) hypertension    Fracture    closed fracture of other  tarsal and metatarsal bones    Fracture of proximal humerus    GERD without esophagitis    High risk medication use    Hypercholesteremia    Hypomagnesemia    Infected stasis ulcer of left lower extremity    Insomnia    Low back pain    Major depressive disorder    Morbid (severe) obesity due to excess calories    MRSA pneumonia    Muscle weakness    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 without evidence of necrosis    Obstructive sleep apnea (adult) (pediatric)    Other forms of dyspnea    Other long term (current) drug therapy    Other specified noninfective gastroenteritis and colitis    Other spondylosis, lumbar region    Pain in both knees    Paroxysmal atrial fibrillation    Peripheral neuropathy    attributed to type 2 diabetes    Pneumonia, unspecified organism    Polyneuropathy    Rash and other nonspecific skin eruption    Syncope and collapse    Tachycardia    Tinnitus    Type 1 diabetes mellitus with diabetic chronic kidney disease    Type 2 diabetes mellitus    Unspecified fall, initial encounter    Urinary retention     Past Surgical History:    CHOLECYSTECTOMY    CYSTOSCOPY    FEMUR SURGERY    Shravan placed    KNEE SURGERY    OTHER SURGICAL HISTORY    venous port    TONSILLECTOMY AND ADENOIDECTOMY      Family History   Problem Relation Age of Onset    Coronary artery disease Mother     Hypertension Mother     Diabetes type II Mother     Asthma Father     Diabetes type II Sister     Cancer Sister      Social History     Tobacco Use    Smoking status: Former     Packs/day: 1.00     Years: 12.00     Additional pack years: 0.00     Total pack years: 12.00     Types: Cigarettes     Start date:      Quit date:      Years since quittin.8    Smokeless tobacco: Never   Substance Use Topics    Alcohol use: Not Currently       There are no preventive care reminders to display for this patient.     Immunization History   Administered Date(s) Administered     Flu Vaccine Quad PF >36MO 10/18/2016, 10/16/2017, 11/04/2019    Flu Vaccine Split Quad 11/04/2019    Fluzone (or Fluarix & Flulaval for VFC) >6mos 10/18/2016, 10/16/2017, 11/04/2019, 10/19/2022, 11/14/2023    Influenza Injectable Mdck Pf Quad 10/19/2022    Influenza Quad Vaccine (Inpatient) 09/19/2013    Influenza, Unspecified 09/21/2020    Pneumococcal Conjugate 20-Valent (PCV20) 11/14/2023    Pneumococcal Polysaccharide (PPSV23) 11/20/1997    Tdap 09/18/2018       Allergies   Allergen Reactions    Benadryl [Diphenhydramine] Itching    Proventil [Albuterol] Other (See Comments)     Mouth sores

## 2023-11-14 NOTE — PROGRESS NOTES
Preston Wallis presents to [facility].    Chief Complaint:    Subjective     History of Present Illness:  Diabetes  Hypoglycemia symptoms include nervousness/anxiousness. Pertinent negatives for hypoglycemia include no dizziness. Pertinent negatives for diabetes include no chest pain and no fatigue.         Result Review   The following data was reviewed by Kimmy Riley MD on 11/14/2023.  Lab Results   Component Value Date    WBC 11.98 (H) 08/10/2023    HGB 11.4 (L) 08/10/2023    HCT 37.7 08/10/2023    MCV 90.6 08/10/2023     08/10/2023     Lab Results   Component Value Date    GLUCOSE 124 (H) 08/10/2023    BUN 24 (H) 08/10/2023    CREATININE 1.57 (H) 08/10/2023    EGFR 50.8 (L) 08/10/2023    BCR 15.3 08/10/2023    K 4.5 08/10/2023    CO2 36.2 (H) 08/10/2023    CALCIUM 9.2 08/10/2023    PROTENTOTREF 7.1 08/22/2022    ALBUMIN 3.6 05/25/2023    BILITOT <0.2 06/22/2022    AST 19 06/22/2022    ALT 20 06/22/2022     Lab Results   Component Value Date    CHOL 130 09/07/2023    CHLPL 165 08/26/2020    TRIG 205 (H) 09/07/2023    HDL 48 09/07/2023    LDL 49 09/07/2023     Lab Results   Component Value Date    TSH 1.517 08/30/2023     Lab Results   Component Value Date    HGBA1C 8.7 (A) 11/10/2023     Lab Results   Component Value Date    PSA 0.203 05/25/2023    PSA 0.725 03/17/2022     Lab Results   Component Value Date    IRON 66 05/25/2023      Lab Results   Component Value Date    JLWF33BI 25.5 (L) 05/25/2023               Assessment and Plan:   Diagnoses and all orders for this visit:    1. Type 2 diabetes mellitus with diabetic neuropathy, with long-term current use of insulin (Primary)    2. Peripheral polyneuropathy    3. Benign prostatic hyperplasia with urinary retention    4. Essential (primary) hypertension    5. Gastroesophageal reflux disease without esophagitis    6. Stage 3b chronic kidney disease (CKD)    7. Vitamin D deficiency    8. Paroxysmal atrial  fibrillation    9. Iron deficiency anemia secondary to inadequate dietary iron intake    10. Acute cystitis without hematuria  Comments:  under tx with urology, on keflex    11. 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 Bronchiectasis without complication        {Time Spent (Optional):31057}      Objective     Medications:  Current Outpatient Medications   Medication Instructions    apixaban (ELIQUIS) 5 mg, Oral, Every 12 Hours    atorvastatin (LIPITOR) 20 mg, Oral, Nightly    B-D UF III MINI PEN NEEDLES 31G X 5 MM misc USE FOUR TIMES DAILY BEFORE MEALS AND AT BEDTIME    Budeson-Glycopyrrol-Formoterol (Breztri Aerosphere) 160-9-4.8 MCG/ACT aerosol inhaler No dose, route, or frequency recorded.    bumetanide (BUMEX) 2 MG tablet Take 1 tablet BY MOUTH TWICE DAILY. call cardiology IF weight is greater THAN 2 pounds in 1 DAY OR 5 pounds in A WEEK.    calcium citrate (CALCITRATE) 950 mg, Oral, Daily    cephalexin (KEFLEX) 250 mg, Oral, 4 Times Daily    Continuous Blood Gluc  (FreeStyle Bethany 2 [place]) device No dose, route, or frequency recorded.    Continuous Blood Gluc Sensor (FreeStyle Bethany 2 Sensor) misc USE every 14 DAYS    docusate sodium (COLACE) 100 mg, Oral, Daily    DULoxetine (CYMBALTA) 60 mg, Oral, 2 Times Daily    famotidine (PEPCID) 40 mg, Oral, Every Morning    Farxiga 5 mg, Oral, Daily    ferrous gluconate (FERGON) 324 mg, Oral, Daily With Breakfast    finasteride (PROSCAR) 5 mg, Oral, Daily    folic acid (FOLVITE) 1 mg, Oral, Daily    gabapentin (NEURONTIN) 300 MG capsule TAKE ONE CAPSULE BY MOUTH EVERY MORNING AND TAKE TWO CAPSULES BY MOUTH EVERY NIGHT AT BEDTIME    glucose blood test strip 1 each, Other, Daily, Dx:   E11.40    Insulin Lispro, 1 Unit Dial, (HUMALOG) 100 UNIT/ML solution pen-injector inject EIGHT units UNDER THE SKIN INTO THE APPROPRIATE AREA THREE TIMES DAILY PER sliding scale, IF BLOOD SUGAR < 160= 0 units, 161-220= 2 units, 221-280= 4 units, 281-340 = SIX units,  "341-400 = EIGHT units    Lantus SoloStar 100 UNIT/ML injection pen INJECT 40 UNITS UNDER THE SKIN ONCE DAILY    losartan (COZAAR) 25 mg, Oral, Every Morning    metoprolol tartrate (LOPRESSOR) 100 mg, Oral, 2 Times Daily    montelukast (SINGULAIR) 10 mg, Oral, Every Night at Bedtime    NIFEdipine XL (PROCARDIA XL) 30 mg, Oral, Every Morning    O2 (OXYGEN) 2 Liter O2 - CONTINUOUS (route: Oxygen)    ondansetron ODT (ZOFRAN-ODT) 4 mg, Translingual, Every 8 Hours PRN    Ozempic (1 MG/DOSE) 1 mg, Subcutaneous, Weekly    TRUEplus Lancets 28G misc USE AS DIRECTED    Ventolin  (90 Base) MCG/ACT inhaler INHALE 2 PUFFS FOUR TIMES DAILY AS NEEDED FOR WHEEZING    Vitamin D3 50 mcg, Oral, Daily        Vital Signs:   /69 (BP Location: Left arm, Patient Position: Sitting)   Pulse 76   Temp 97.6 °F (36.4 °C) (Oral)   Ht 175.3 cm (69.02\")   SpO2 94% Comment: 2 liters of O2  BMI 41.03 kg/m²             Physical Exam:  Physical Exam  Vitals and nursing note reviewed.   Constitutional:       General: He is not in acute distress.     Appearance: Normal appearance. He is not ill-appearing, toxic-appearing or diaphoretic.   HENT:      Head: Normocephalic and atraumatic.      Right Ear: Tympanic membrane, ear canal and external ear normal.      Left Ear: Tympanic membrane, ear canal and external ear normal.      Nose: No congestion or rhinorrhea.      Mouth/Throat:      Mouth: Mucous membranes are moist.      Pharynx: Oropharynx is clear. No oropharyngeal exudate or posterior oropharyngeal erythema.   Eyes:      Extraocular Movements: Extraocular movements intact.      Conjunctiva/sclera: Conjunctivae normal.      Pupils: Pupils are equal, round, and reactive to light.   Cardiovascular:      Rate and Rhythm: Normal rate and regular rhythm.      Heart sounds: Normal heart sounds.   Pulmonary:      Effort: Pulmonary effort is normal.      Breath sounds: Normal breath sounds. No wheezing, rhonchi or rales.   Abdominal:      " General: Abdomen is flat.      Palpations: Abdomen is soft. There is no mass.      Tenderness: There is no abdominal tenderness.      Hernia: No hernia is present.   Musculoskeletal:      Cervical back: Neck supple. No rigidity.      Right lower leg: No edema.      Left lower leg: No edema.   Lymphadenopathy:      Cervical: No cervical adenopathy.   Skin:     General: Skin is warm and dry.   Neurological:      General: No focal deficit present.      Mental Status: He is alert and oriented to person, place, and time. Mental status is at baseline.   Psychiatric:         Mood and Affect: Mood normal.         Behavior: Behavior normal.         Thought Content: Thought content normal.         Judgment: Judgment normal.           Review of Systems:  Review of Systems   Constitutional:  Negative for chills, fatigue and fever.   HENT:  Negative for congestion, ear discharge and sore throat.    Respiratory:  Negative for shortness of breath.    Cardiovascular:  Negative for chest pain.   Gastrointestinal:  Negative for abdominal pain, constipation, diarrhea, nausea, vomiting and GERD.   Genitourinary:  Negative for flank pain.   Neurological:  Negative for dizziness and headache.   Psychiatric/Behavioral:  Positive for sleep disturbance and depressed mood. Negative for suicidal ideas. The patient is nervous/anxious.               Follow Up   No follow-ups on file.    Part of this note may be an electronic transcription/translation of spoken language to printed   text using the Dragon Dictation System.            Medical History:  There are no discontinued medications.   Past Medical History:    Age-related cognitive decline    Allergic contact dermatitis    Allergies    Anemia    Bronchiectasis with acute lower respiratory infection    Charcot foot due to diabetes mellitus    Chronic diastolic (congestive) heart failure    Chronic kidney disease    Chronic respiratory failure with hypoxia    Closed supracondylar fracture of  femur    COPD (chronic obstructive pulmonary disease)    Deep vein thrombosis (DVT) of lower extremity associated with air travel    Dependence on supplemental oxygen    Eczema    Erectile dysfunction    due to organic reasons    Essential (primary) hypertension    Fracture    closed fracture of other tarsal and metatarsal bones    Fracture of proximal humerus    GERD without esophagitis    High risk medication use    Hypercholesteremia    Hypomagnesemia    Infected stasis ulcer of left lower extremity    Insomnia    Low back pain    Major depressive disorder    Morbid (severe) obesity due to excess calories    MRSA pneumonia    Muscle weakness    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 without evidence of necrosis    Obstructive sleep apnea (adult) (pediatric)    Other forms of dyspnea    Other long term (current) drug therapy    Other specified noninfective gastroenteritis and colitis    Other spondylosis, lumbar region    Pain in both knees    Paroxysmal atrial fibrillation    Peripheral neuropathy    attributed to type 2 diabetes    Pneumonia, unspecified organism    Polyneuropathy    Rash and other nonspecific skin eruption    Syncope and collapse    Tachycardia    Tinnitus    Type 1 diabetes mellitus with diabetic chronic kidney disease    Type 2 diabetes mellitus    Unspecified fall, initial encounter    Urinary retention     Past Surgical History:    CHOLECYSTECTOMY    CYSTOSCOPY    FEMUR SURGERY    Shravan placed    KNEE SURGERY    OTHER SURGICAL HISTORY    venous port    TONSILLECTOMY AND ADENOIDECTOMY      Family History   Problem Relation Age of Onset    Coronary artery disease Mother     Hypertension Mother     Diabetes type II Mother     Asthma Father     Diabetes type II Sister     Cancer Sister      Social History     Tobacco Use    Smoking status: Former     Packs/day: 1.00     Years: 12.00     Additional pack years: 0.00     Total pack years: 12.00     Types:  Cigarettes     Start date:      Quit date:      Years since quittin.8    Smokeless tobacco: Never   Substance Use Topics    Alcohol use: Not Currently       Health Maintenance Due   Topic Date Due    Pneumococcal Vaccine 0-64 (2 - PCV) 1998    INFLUENZA VACCINE  2023        Immunization History   Administered Date(s) Administered    Flu Vaccine Quad PF >36MO 10/18/2016, 10/16/2017, 2019    Flu Vaccine Split Quad 2019    Fluzone (or Fluarix & Flulaval for VFC) >6mos 10/18/2016, 10/16/2017, 2019, 10/19/2022    Influenza Injectable Mdck Pf Quad 10/19/2022    Influenza Quad Vaccine (Inpatient) 2013    Influenza, Unspecified 2020    Pneumococcal Polysaccharide (PPSV23) 1997    Tdap 2018       Allergies   Allergen Reactions    Benadryl [Diphenhydramine] Itching    Proventil [Albuterol] Other (See Comments)     Mouth sores

## 2023-11-17 ENCOUNTER — OFFICE VISIT (OUTPATIENT)
Dept: ORTHOPEDIC SURGERY | Facility: CLINIC | Age: 58
End: 2023-11-17
Payer: COMMERCIAL

## 2023-11-17 VITALS — BODY MASS INDEX: 41.18 KG/M2 | OXYGEN SATURATION: 94 % | HEIGHT: 69 IN | WEIGHT: 278 LBS

## 2023-11-17 DIAGNOSIS — M75.101 TEAR OF RIGHT ROTATOR CUFF, UNSPECIFIED TEAR EXTENT, UNSPECIFIED WHETHER TRAUMATIC: ICD-10-CM

## 2023-11-17 DIAGNOSIS — M19.011 PRIMARY OSTEOARTHRITIS OF RIGHT SHOULDER: ICD-10-CM

## 2023-11-17 DIAGNOSIS — M25.511 RIGHT SHOULDER PAIN, UNSPECIFIED CHRONICITY: Primary | ICD-10-CM

## 2023-11-17 DIAGNOSIS — S42.201S CLOSED FRACTURE OF PROXIMAL END OF RIGHT HUMERUS, UNSPECIFIED FRACTURE MORPHOLOGY, SEQUELA: ICD-10-CM

## 2023-11-17 NOTE — PROGRESS NOTES
"Chief Complaint  Pain and Follow-up of the Right Shoulder    Subjective          Preston Wallis presents to [facility] for   History of Present Illness    Mr. Wallis returns today for follow-up of his right shoulder.  To review, he had a history of a right shoulder fracture treating nonoperatively.  He had posttraumatic arthritis and rotator cuff tearing noted on follow-up imaging.  He is in a wheelchair full-time and requires full-time oxygen use.  We attempted a steroid injection last visit.  He did not get any relief from the injection.  He denies any new injury since his previous evaluation.    Allergies   Allergen Reactions    Benadryl [Diphenhydramine] Itching    Proventil [Albuterol] Other (See Comments)     Mouth sores          Social History     Socioeconomic History    Marital status:    Tobacco Use    Smoking status: Former     Packs/day: 1.00     Years: 12.00     Additional pack years: 0.00     Total pack years: 12.00     Types: Cigarettes     Start date:      Quit date:      Years since quittin.8    Smokeless tobacco: Never   Vaping Use    Vaping Use: Never used   Substance and Sexual Activity    Alcohol use: Not Currently    Drug use: Never    Sexual activity: Defer        I reviewed the patient's chief complaint, history of present illness, review of systems, past medical history, surgical history, family history, social history, medications, and allergy list.     REVIEW OF SYSTEMS    Constitutional: Denies fevers, chills, weight loss  Cardiovascular: Denies chest pain, shortness of breath  Skin: Denies rashes, acute skin changes  Neurologic: Denies headache, loss of consciousness  MSK: Right shoulder pain      Objective   Vital Signs:   Ht 175.3 cm (69\")   Wt 126 kg (278 lb)   SpO2 94%   BMI 41.05 kg/m²     Body mass index is 41.05 kg/m².    Physical Exam    General: Alert. No acute distress.   Right upper extremity: Forward elevation 30, External " Rotation 10. Internal rotation to the lateral hip. Forward elevation passive, 60, 3/5 supraspinatus and infraspinatus, 4/5 subscapularis. Neurovascularly intact. No wounds about the shoulder.     Procedures    Imaging Results (Most Recent)       None                     Assessment and Plan        NM Parathyroid Scan w SPECT CT    Result Date: 10/26/2023  Narrative: PROCEDURE: NM PARATHYROID PLANAR WITH SPECT AND CT  COMPARISON: [facility], CT, CT CHEST WO CONTRAST DIAGNOSTIC, 6/15/2023, 10:02.  INDICATIONS: Hyperparathyroidism  TECHNIQUE: Multiplanar images of the neck with SPECT were obtained in conjunction with non diagnostic low dose localization CT.  FINDINGS: There is increased radiotracer uptake on the delayed images within the left lobe of the thyroid gland.  On the SPECT images, I do not see a definite extra-thyroidal soft tissue mass.  There is also no mass visible within the thyroid gland.  The finding is of questionable significance.  There is physiologic activity within the salivary glands, tongue, and myocardium.  There is uptake demonstrated within the lungs.  There are areas of parenchymal consolidation in the upper lobes.  When correlated with the previous CT of the chest, the patient has chronic inflammatory/infectious changes in both lungs accounting for the activity.      Impression:  No convincing evidence of a parathyroid adenoma (see above discussion).      TANYA PFEIFFER MD       Electronically Signed and Approved By: TANYA PFEIFFER MD on 10/26/2023 at 9:43               Diagnoses and all orders for this visit:    1. Right shoulder pain, unspecified chronicity (Primary)  -     Ambulatory Referral to Pain Management    2. Primary osteoarthritis of right shoulder    3. Closed fracture of proximal end of right humerus, unspecified fracture morphology, sequela    4. Tear of right rotator cuff, unspecified tear extent, unspecified whether traumatic        We discussed continued nonoperative  management.  He did not get any relief from the intra-articular injection.  We discussed a pain management referral for consideration of nerve ablation to the shoulder.  He is not an ideal operative candidate given his multiple medical comorbidities and full-time oxygen requirement.  He will attempt the nerve ablation procedure and follow-up with me as needed.      Call or return if worsening symptoms.    Scribed for Temo Aldrich MD by Temo Aldrich MD  11/17/2023   10:31 EST         Follow Up       As needed    Patient was given instructions and counseling regarding his condition or for health maintenance advice. Please see specific information pulled into the AVS if appropriate.       I have personally performed the services described in this document as scribed by the above individual and it is both accurate and complete.     Temo Aldrich MD  11/17/23  10:32 EST

## 2023-11-20 ENCOUNTER — OFFICE VISIT (OUTPATIENT)
Dept: UROLOGY | Facility: CLINIC | Age: 58
End: 2023-11-20
Payer: COMMERCIAL

## 2023-11-20 VITALS
DIASTOLIC BLOOD PRESSURE: 53 MMHG | SYSTOLIC BLOOD PRESSURE: 122 MMHG | WEIGHT: 278 LBS | TEMPERATURE: 97.1 F | HEART RATE: 75 BPM | BODY MASS INDEX: 41.18 KG/M2 | HEIGHT: 69 IN

## 2023-11-20 DIAGNOSIS — E10.8 DIABETES MELLITUS TYPE 1 WITH COMPLICATIONS: ICD-10-CM

## 2023-11-20 DIAGNOSIS — N45.3 EPIDIDYMOORCHITIS: ICD-10-CM

## 2023-11-20 DIAGNOSIS — R33.9 URINARY RETENTION: Primary | ICD-10-CM

## 2023-11-20 DIAGNOSIS — Z78.9 INTERMITTENT SELF-CATHETERIZATION OF BLADDER: ICD-10-CM

## 2023-11-20 DIAGNOSIS — N31.2 NEUROGENIC BLADDER, FLACCID: ICD-10-CM

## 2023-11-20 LAB
BACTERIA UR QL AUTO: NORMAL /HPF
BILIRUB BLD-MCNC: NEGATIVE MG/DL
CLARITY, POC: CLEAR
COLOR UR: YELLOW
EPI CELLS #/AREA URNS HPF: 0 /[HPF]
EXPIRATION DATE: ABNORMAL
GLUCOSE UR STRIP-MCNC: ABNORMAL MG/DL
KETONES UR QL: NEGATIVE
LEUKOCYTE EST, POC: NEGATIVE
Lab: ABNORMAL
NITRITE UR-MCNC: NEGATIVE MG/ML
PH UR: 6 [PH] (ref 5–8)
PROT UR STRIP-MCNC: ABNORMAL MG/DL
RBC # UR STRIP: ABNORMAL /UL
RBC # UR STRIP: NORMAL /HPF
RENAL EPITHELIAL, POC: 0
SP GR UR: 1.01 (ref 1–1.03)
UNIDENT CRYS URNS QL MICRO: NORMAL /HPF
UROBILINOGEN UR QL: ABNORMAL
WBC # UR STRIP: NORMAL /HPF

## 2023-11-20 NOTE — PROGRESS NOTES
"Chief Complaint  Follow-up (2w follow up) for left epididymoorchitis    Subjective no acute distress        Preston Wallis presents to [facility]  History of Present Illness    58-year-old white male who has neurogenic bladder from diabetes mellitus and has urinary retention.  Wife catheterizes him 4 times a day and came in with left epididymoorchitis.  Urine culture grew 25,000 colonies of Streptococcus beta-hemolytic group C.  Patient was given Keflex before they will culture and then was continued for about 3 weeks because of obvious clinical left epididymoorchitis.  Patient is diabetic and obese and is a perfect set up for cellulitis and gangrene.    Patient is doing fine now and testicle is almost normal.  Patient has no dysuria or gross hematuria now    Objective no acute distress  Vital Signs:   /53 (BP Location: Left arm, Patient Position: Sitting, Cuff Size: Adult)   Pulse 75   Temp 97.1 °F (36.2 °C) (Temporal)   Ht 175.3 cm (69\")   Wt 126 kg (278 lb)   BMI 41.05 kg/m²     Allergies   Allergen Reactions    Benadryl [Diphenhydramine] Itching    Proventil [Albuterol] Other (See Comments)     Mouth sores        Past medical history:  has a past medical history of Age-related cognitive decline, Allergic contact dermatitis, Allergies, Anemia, Bronchiectasis with acute lower respiratory infection, Charcot foot due to diabetes mellitus (9/10/2013), Chronic diastolic (congestive) heart failure, Chronic kidney disease, Chronic respiratory failure with hypoxia, Closed supracondylar fracture of femur (1/12/2022), COPD (chronic obstructive pulmonary disease), Deep vein thrombosis (DVT) of lower extremity associated with air travel (1/13/2023), Dependence on supplemental oxygen, Eczema, Erectile dysfunction, Essential (primary) hypertension, Fracture, Fracture of proximal humerus (1/13/2023), GERD without esophagitis, High risk medication use, Hypercholesteremia, Hypomagnesemia, " Infected stasis ulcer of left lower extremity (1/13/2023), Insomnia, Low back pain, Major depressive disorder, Morbid (severe) obesity due to excess calories, MRSA pneumonia, Muscle weakness, 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 without evidence of necrosis, Obstructive sleep apnea (adult) (pediatric), Other forms of dyspnea, Other long term (current) drug therapy, Other specified noninfective gastroenteritis and colitis, Other spondylosis, lumbar region, Pain in both knees, Paroxysmal atrial fibrillation, Peripheral neuropathy, Pneumonia, unspecified organism, Polyneuropathy, Rash and other nonspecific skin eruption, Syncope and collapse, Tachycardia, Tinnitus (1/13/2023), Type 1 diabetes mellitus with diabetic chronic kidney disease, Type 2 diabetes mellitus, Unspecified fall, initial encounter, and Urinary retention.   Past surgical history:  has a past surgical history that includes Cholecystectomy; tonsillectomy and adenoidectomy; Knee surgery (Left); Other surgical history (Left); Cystoscopy; and Femur Surgery (Left).  Personal history: family history includes Asthma in his father; Cancer in his sister; Coronary artery disease in his mother; Diabetes type II in his mother and sister; Hypertension in his mother.  Social history:  reports that he quit smoking about 30 years ago. His smoking use included cigarettes. He started smoking about 42 years ago. He has a 12.00 pack-year smoking history. He has never used smokeless tobacco. He reports that he does not currently use alcohol. He reports that he does not use drugs.    Review of Systems    Please see past medical or surgical history rest of the system is negative.    Physical Exam  Constitutional:       General: He is not in acute distress.     Appearance: Normal appearance. He is obese. He is not ill-appearing or toxic-appearing.      Comments: Patient is on the wheelchair   HENT:      Head: Normocephalic and  atraumatic.      Ears:      Comments: No loss of hearing  Genitourinary:     Penis: Normal.       Comments: Right and left scrotum is normal.    Right testicle and epididymis is normal.    Left testicle is normal.  Still have some swelling of the left epididymis but it is not bad.  Patient is markedly improved.    Patient is nonambulatory and cannot check his prostate  Musculoskeletal:         General: Swelling present.      Right lower leg: Edema present.      Left lower leg: Edema present.   Skin:     General: Skin is warm.      Coloration: Skin is not jaundiced.   Neurological:      General: No focal deficit present.      Mental Status: He is alert and oriented to person, place, and time.      Motor: Weakness present.      Comments: Wheelchair-bound   Psychiatric:         Mood and Affect: Mood normal.         Behavior: Behavior normal.         Thought Content: Thought content normal.         Judgment: Judgment normal.        Result Review :                 Assessment and Plan    Diagnoses and all orders for this visit:    1. Urinary retention (Primary)  -     POC Urinalysis Dipstick, Automated    2. Epididymoorchitis    3. Intermittent self-catheterization of bladder    4. Neurogenic bladder, flaccid    5. Diabetes mellitus type 1 with complications      Patient is markedly improved.  I will recheck him in 6 months time because of intermittent catheterization and neurogenic bladder  Brief Urine Lab Results  (Last result in the past 365 days)        Color   Clarity   Blood   Leuk Est   Nitrite   Protein   CREAT   Urine HCG        11/20/23 0953 Yellow   Clear   Small   Negative   Negative   100 mg/dL                    Follow Up   No follow-ups on file.  Patient was given instructions and counseling regarding his condition or for health maintenance advice. Please see specific information pulled into the AVS if appropriate.     Teresita White MD

## 2023-11-21 DIAGNOSIS — R11.0 NAUSEA: ICD-10-CM

## 2023-11-21 RX ORDER — ONDANSETRON 4 MG/1
4 TABLET, ORALLY DISINTEGRATING ORAL EVERY 8 HOURS PRN
Qty: 30 TABLET | Refills: 0 | Status: SHIPPED | OUTPATIENT
Start: 2023-11-21 | End: 2023-11-22 | Stop reason: SDUPTHER

## 2023-11-22 RX ORDER — ONDANSETRON 4 MG/1
TABLET, ORALLY DISINTEGRATING ORAL
Qty: 10 TABLET | Refills: 0 | Status: SHIPPED | OUTPATIENT
Start: 2023-11-22

## 2023-11-28 ENCOUNTER — PATIENT OUTREACH (OUTPATIENT)
Dept: CASE MANAGEMENT | Facility: OTHER | Age: 58
End: 2023-11-28
Payer: COMMERCIAL

## 2023-11-28 DIAGNOSIS — I50.32 CHRONIC DIASTOLIC (CONGESTIVE) HEART FAILURE: Primary | ICD-10-CM

## 2023-11-28 DIAGNOSIS — E11.65 TYPE 2 DIABETES MELLITUS WITH HYPERGLYCEMIA, WITH LONG-TERM CURRENT USE OF INSULIN: ICD-10-CM

## 2023-11-28 DIAGNOSIS — R33.8 BENIGN PROSTATIC HYPERPLASIA WITH URINARY RETENTION: ICD-10-CM

## 2023-11-28 DIAGNOSIS — G62.9 PERIPHERAL POLYNEUROPATHY: ICD-10-CM

## 2023-11-28 DIAGNOSIS — N40.1 BENIGN PROSTATIC HYPERPLASIA WITH URINARY RETENTION: ICD-10-CM

## 2023-11-28 DIAGNOSIS — Z79.4 TYPE 2 DIABETES MELLITUS WITH HYPERGLYCEMIA, WITH LONG-TERM CURRENT USE OF INSULIN: ICD-10-CM

## 2023-11-28 PROCEDURE — 99439 CHRNC CARE MGMT STAF EA ADDL: CPT | Performed by: FAMILY MEDICINE

## 2023-11-28 PROCEDURE — 99490 CHRNC CARE MGMT STAFF 1ST 20: CPT | Performed by: FAMILY MEDICINE

## 2023-11-28 NOTE — OUTREACH NOTE
AMBULATORY CASE MANAGEMENT NOTE    Name and Relationship of Patient/Support Person: BimalKami G - Emergency Contact    Elizabeth called requesting an appointment to see Dr. Riley - he was having some congestion, weakness , concern for dehydration.  PCP not available and they declined another provider.  She is going to take him to the ER for evaluation, Elizabeth believes he will be admitted.  Requested that they come by here to  medication list before they go to the ER for continuity of care.      Asked Elizabeth why cancelled ENT, she states that there was nothing wrong on his scan. Explained to her that it only showed no cancer, he needs to follow up for full workup.  Rescheduled.      Brought meds by also.  Pharmacy dispensed the 50mg tablet of the metoprolol - called Harinder at [facility], this med was to be discontinued, he states it was on auto refill.  Disposed of med.  He is currently on 100mg bid of Metoprolol.     Reviewed chart, admitted for observation.  WBC ct 37,000 - given antibiotics.   BUN/cr elevated also, Na - possible dehydration as Kami stated.   Has pain management on Nov 30, 2023, unsure if [place] or [place].  Kami to call to confirm.     Tara R  Ambulatory Case Management    11/28/2023, 12:05 EST    [facility] End of Month Documentation    This Chronic Medical Management Care Plan for Preston Wallis, 58 y.o. male, has been established; monitored and managed; reviewed and a new plan of care implemented for the month of November.  A cumulative time of 45 minutes was spent on this patient record this month, including phone call with care giver; electronic communication with primary care provider; electronic communication with pharmacist; chart review; phone call with patient.    Regarding the patient's problems: has Polyneuropathy; Paroxysmal atrial fibrillation; Obstructive sleep apnea; MRSA pneumonia; Low back pain; Chronic diastolic heart failure; Allergies; COPD exacerbation; Chronic anticoagulation;  Benign prostatic hyperplasia; Impaired mobility and endurance; Stage 3a chronic kidney disease; Iron deficiency anemia secondary to inadequate dietary iron intake; Vitamin D deficiency; Class 3 severe obesity with serious comorbidity in adult; Lower extremity edema; Elevated alkaline phosphatase level; Venous insufficiency (chronic) (peripheral); Tobacco abuse, in remission; History of Pseudomonas pneumonia; Chronic dyspnea; Gastroesophageal reflux disease; Bronchiectasis without complication; Altered mental status; Hyperlipidemia; Luetscher's syndrome; Neurogenic bladder; Class 1 obesity; Pneumonia due to Pseudomonas species; Seizures; Primary osteoarthritis of left knee; Other constipation; Chronic obstructive pulmonary disease; 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  A detailed listing of time spent for chronic care management is tracked within each outreach encounter.  Current medications include:  has a current medication list which includes the following prescription(s): apixaban, atorvastatin, b-d uf iii mini pen needles, breztri aerosphere, bumetanide, calcium citrate, vitamin d3, freestyle clau 2 reader, freestyle clau 2 sensor, farxiga, docusate sodium, duloxetine, famotidine, ferrous gluconate, finasteride, folic acid, gabapentin, glucose blood, insulin lispro (1 unit dial), lantus solostar, losartan, metoprolol tartrate, montelukast, nifedipine xl, o2, ondansetron  odt, ozempic (1 mg/dose), trueplus lancets 28g, and ventolin hfa. and the patient is reported to be caregiver will take responsibility for med compliance; patient is compliant with medication protocol,  Medications are reported to be non-effective in controlling symptoms and changes have been made to the medication protocol.  Regarding these diagnoses, referrals were made to the following provider(s):  specialists.  All notes on chart for PCP to review.    The patient was monitored remotely for pain; medications; blood glucose; mood & behavior.    The patient's physical needs include:  help taking medications as prescribed; medication education; needs assistance with ADLs; resources for disability needs; DME supplies.     The patient's mental support needs include:  continued support    The patient's cognitive support needs include:  medication; continued support; needs assistance with ADLs; health care    The patient's psychosocial support needs include:  continued support; coordination of community providers; medication management or adherence    The patient's functional needs include: health care coverage; medication education; needs assistance for ADLs; physical healthcare; physician referral; resources for disability needs, Limited mobility.    The patient's environmental needs include:  resources for disability needs    Care Plan overall comments:  Still not at goal, however improving. will continue to follow. Has many upcoming appointments and referrals to specialists.    Refer to previous outreach notes for more information on the areas listed above.    Monthly Billing Diagnoses  (I50.32) Chronic diastolic (congestive) heart failure    (E11.65,  Z79.4) Type 2 diabetes mellitus with hyperglycemia, with long-term current use of insulin    (G62.9) Peripheral polyneuropathy    (N40.1,  R33.8) Benign prostatic hyperplasia with urinary retention    Medications   Medications have been reconciled    Care Plan  progress this month:      Recently Modified Care Plans Updates made since 10/28/2023 12:00 AM      No recently modified care plans.            Current Specialty Plan of Care Status signed by both patient and provider    Instructions   Patient was provided an electronic copy of care plan  CCM services were explained and offered and patient has accepted these services.  Patient has given their written consent to receive CCM services and understands that this includes the authorization of electronic communication of medical information with the other treating providers.  Patient understands that they may stop CCM services at any time and these changes will be effective at the end of the calendar month and will effectively revocate the agreement of CCM services.  Patient understands that only one practitioner can furnish and be paid for CCM services during one calendar month.  Patient also understands that there may be co-payment or deductible fees in association with CCM services.  Patient will continue with at least monthly follow-up calls with the Ambulatory .    Tara GOMEZ  Ambulatory Case Management    11/28/2023, 12:05 EST

## 2023-12-06 ENCOUNTER — APPOINTMENT (OUTPATIENT)
Dept: GENERAL RADIOLOGY | Facility: HOSPITAL | Age: 58
DRG: 853 | End: 2023-12-06
Payer: COMMERCIAL

## 2023-12-06 ENCOUNTER — HOSPITAL ENCOUNTER (INPATIENT)
Facility: HOSPITAL | Age: 58
LOS: 8 days | Discharge: HOME | DRG: 853 | End: 2023-12-14
Attending: INTERNAL MEDICINE | Admitting: HOSPITALIST
Payer: COMMERCIAL

## 2023-12-06 DIAGNOSIS — E11.40 POORLY CONTROLLED TYPE 2 DIABETES MELLITUS WITH NEUROPATHY: ICD-10-CM

## 2023-12-06 DIAGNOSIS — E11.65 POORLY CONTROLLED TYPE 2 DIABETES MELLITUS WITH NEUROPATHY: ICD-10-CM

## 2023-12-06 DIAGNOSIS — I50.32 CHRONIC DIASTOLIC (CONGESTIVE) HEART FAILURE: ICD-10-CM

## 2023-12-06 PROBLEM — J18.9 PNEUMONIA: Status: ACTIVE | Noted: 2023-12-06

## 2023-12-06 PROBLEM — A41.9 SEPSIS: Status: ACTIVE | Noted: 2023-12-06

## 2023-12-06 LAB
ALBUMIN SERPL-MCNC: 2.8 G/DL (ref 3.5–5.2)
ALBUMIN/GLOB SERPL: 0.7 G/DL
ALP SERPL-CCNC: 124 U/L (ref 39–117)
ALT SERPL W P-5'-P-CCNC: 22 U/L (ref 1–41)
ANION GAP SERPL CALCULATED.3IONS-SCNC: 12.8 MMOL/L (ref 5–15)
AST SERPL-CCNC: 17 U/L (ref 1–40)
BASOPHILS # BLD AUTO: 0.03 10*3/MM3 (ref 0–0.2)
BASOPHILS NFR BLD AUTO: 0.1 % (ref 0–1.5)
BILIRUB SERPL-MCNC: 0.3 MG/DL (ref 0–1.2)
BUN SERPL-MCNC: 48 MG/DL (ref 6–20)
BUN/CREAT SERPL: 15.5 (ref 7–25)
CALCIUM SPEC-SCNC: 8.5 MG/DL (ref 8.6–10.5)
CHLORIDE SERPL-SCNC: 101 MMOL/L (ref 98–107)
CO2 SERPL-SCNC: 24.2 MMOL/L (ref 22–29)
CREAT SERPL-MCNC: 3.09 MG/DL (ref 0.76–1.27)
D-LACTATE SERPL-SCNC: 1.3 MMOL/L (ref 0.5–2)
DEPRECATED RDW RBC AUTO: 39.9 FL (ref 37–54)
EGFRCR SERPLBLD CKD-EPI 2021: 22.5 ML/MIN/1.73
EOSINOPHIL # BLD AUTO: 0.42 10*3/MM3 (ref 0–0.4)
EOSINOPHIL NFR BLD AUTO: 1.7 % (ref 0.3–6.2)
ERYTHROCYTE [DISTWIDTH] IN BLOOD BY AUTOMATED COUNT: 13 % (ref 12.3–15.4)
GLOBULIN UR ELPH-MCNC: 3.8 GM/DL
GLUCOSE BLDC GLUCOMTR-MCNC: 192 MG/DL (ref 70–130)
GLUCOSE SERPL-MCNC: 151 MG/DL (ref 65–99)
HCT VFR BLD AUTO: 23.3 % (ref 37.5–51)
HGB BLD-MCNC: 7.7 G/DL (ref 13–17.7)
IMM GRANULOCYTES # BLD AUTO: 0.51 10*3/MM3 (ref 0–0.05)
IMM GRANULOCYTES NFR BLD AUTO: 2.1 % (ref 0–0.5)
INR PPP: 1.34 (ref 0.9–1.1)
LYMPHOCYTES # BLD AUTO: 2.61 10*3/MM3 (ref 0.7–3.1)
LYMPHOCYTES NFR BLD AUTO: 10.5 % (ref 19.6–45.3)
MCH RBC QN AUTO: 28.5 PG (ref 26.6–33)
MCHC RBC AUTO-ENTMCNC: 33 G/DL (ref 31.5–35.7)
MCV RBC AUTO: 86.3 FL (ref 79–97)
MONOCYTES # BLD AUTO: 1.58 10*3/MM3 (ref 0.1–0.9)
MONOCYTES NFR BLD AUTO: 6.4 % (ref 5–12)
NEUTROPHILS NFR BLD AUTO: 19.67 10*3/MM3 (ref 1.7–7)
NEUTROPHILS NFR BLD AUTO: 79.2 % (ref 42.7–76)
NRBC BLD AUTO-RTO: 0.2 /100 WBC (ref 0–0.2)
PLATELET # BLD AUTO: 435 10*3/MM3 (ref 140–450)
PMV BLD AUTO: 9 FL (ref 6–12)
POTASSIUM SERPL-SCNC: 4 MMOL/L (ref 3.5–5.2)
PROCALCITONIN SERPL-MCNC: 1.33 NG/ML (ref 0–0.25)
PROT SERPL-MCNC: 6.6 G/DL (ref 6–8.5)
PROTHROMBIN TIME: 16.8 SECONDS (ref 11.7–14.2)
RBC # BLD AUTO: 2.7 10*6/MM3 (ref 4.14–5.8)
SODIUM SERPL-SCNC: 138 MMOL/L (ref 136–145)
WBC NRBC COR # BLD AUTO: 24.82 10*3/MM3 (ref 3.4–10.8)

## 2023-12-06 PROCEDURE — 85025 COMPLETE CBC W/AUTO DIFF WBC: CPT | Performed by: NURSE PRACTITIONER

## 2023-12-06 PROCEDURE — 80053 COMPREHEN METABOLIC PANEL: CPT | Performed by: NURSE PRACTITIONER

## 2023-12-06 PROCEDURE — 84145 PROCALCITONIN (PCT): CPT | Performed by: NURSE PRACTITIONER

## 2023-12-06 PROCEDURE — 82948 REAGENT STRIP/BLOOD GLUCOSE: CPT

## 2023-12-06 PROCEDURE — 71045 X-RAY EXAM CHEST 1 VIEW: CPT

## 2023-12-06 PROCEDURE — 83605 ASSAY OF LACTIC ACID: CPT | Performed by: NURSE PRACTITIONER

## 2023-12-06 PROCEDURE — 85610 PROTHROMBIN TIME: CPT | Performed by: NURSE PRACTITIONER

## 2023-12-06 RX ORDER — BISACODYL 5 MG/1
5 TABLET, DELAYED RELEASE ORAL DAILY PRN
Status: DISCONTINUED | OUTPATIENT
Start: 2023-12-06 | End: 2023-12-14 | Stop reason: HOSPADM

## 2023-12-06 RX ORDER — ACETAMINOPHEN 325 MG/1
650 TABLET ORAL EVERY 4 HOURS PRN
Status: DISCONTINUED | OUTPATIENT
Start: 2023-12-06 | End: 2023-12-14 | Stop reason: HOSPADM

## 2023-12-06 RX ORDER — POLYETHYLENE GLYCOL 3350 17 G/17G
17 POWDER, FOR SOLUTION ORAL DAILY PRN
Status: DISCONTINUED | OUTPATIENT
Start: 2023-12-06 | End: 2023-12-14 | Stop reason: HOSPADM

## 2023-12-06 RX ORDER — ONDANSETRON 2 MG/ML
4 INJECTION INTRAMUSCULAR; INTRAVENOUS EVERY 6 HOURS PRN
Status: DISCONTINUED | OUTPATIENT
Start: 2023-12-06 | End: 2023-12-14 | Stop reason: HOSPADM

## 2023-12-06 RX ORDER — NICOTINE POLACRILEX 4 MG
15 LOZENGE BUCCAL
Status: DISCONTINUED | OUTPATIENT
Start: 2023-12-06 | End: 2023-12-14 | Stop reason: HOSPADM

## 2023-12-06 RX ORDER — IBUPROFEN 600 MG/1
1 TABLET ORAL
Status: DISCONTINUED | OUTPATIENT
Start: 2023-12-06 | End: 2023-12-14 | Stop reason: HOSPADM

## 2023-12-06 RX ORDER — FLUTICASONE PROPIONATE AND SALMETEROL 500; 50 UG/1; UG/1
1 POWDER RESPIRATORY (INHALATION)
COMMUNITY

## 2023-12-06 RX ORDER — INSULIN LISPRO 100 [IU]/ML
2-9 INJECTION, SOLUTION INTRAVENOUS; SUBCUTANEOUS
Status: DISCONTINUED | OUTPATIENT
Start: 2023-12-06 | End: 2023-12-07 | Stop reason: DRUGHIGH

## 2023-12-06 RX ORDER — CHOLECALCIFEROL (VITAMIN D3) 125 MCG
5 CAPSULE ORAL NIGHTLY PRN
Status: DISCONTINUED | OUTPATIENT
Start: 2023-12-06 | End: 2023-12-14 | Stop reason: HOSPADM

## 2023-12-06 RX ORDER — EXENATIDE 2 MG/.85ML
2 INJECTION, SUSPENSION, EXTENDED RELEASE SUBCUTANEOUS WEEKLY
COMMUNITY

## 2023-12-06 RX ORDER — ALUMINA, MAGNESIA, AND SIMETHICONE 2400; 2400; 240 MG/30ML; MG/30ML; MG/30ML
15 SUSPENSION ORAL EVERY 6 HOURS PRN
Status: DISCONTINUED | OUTPATIENT
Start: 2023-12-06 | End: 2023-12-14 | Stop reason: HOSPADM

## 2023-12-06 RX ORDER — BISACODYL 10 MG
10 SUPPOSITORY, RECTAL RECTAL DAILY PRN
Status: DISCONTINUED | OUTPATIENT
Start: 2023-12-06 | End: 2023-12-14 | Stop reason: HOSPADM

## 2023-12-06 RX ORDER — ONDANSETRON 4 MG/1
4 TABLET, FILM COATED ORAL EVERY 6 HOURS PRN
Status: DISCONTINUED | OUTPATIENT
Start: 2023-12-06 | End: 2023-12-14 | Stop reason: HOSPADM

## 2023-12-06 RX ORDER — AMOXICILLIN 250 MG
2 CAPSULE ORAL 2 TIMES DAILY
Status: DISCONTINUED | OUTPATIENT
Start: 2023-12-06 | End: 2023-12-14 | Stop reason: HOSPADM

## 2023-12-06 RX ORDER — SODIUM CHLORIDE 0.9 % (FLUSH) 0.9 %
10 SYRINGE (ML) INJECTION AS NEEDED
Status: DISCONTINUED | OUTPATIENT
Start: 2023-12-06 | End: 2023-12-14 | Stop reason: HOSPADM

## 2023-12-06 RX ORDER — LINEZOLID 2 MG/ML
600 INJECTION, SOLUTION INTRAVENOUS EVERY 12 HOURS
Status: DISCONTINUED | OUTPATIENT
Start: 2023-12-06 | End: 2023-12-07

## 2023-12-06 RX ORDER — DEXTROSE MONOHYDRATE 25 G/50ML
25 INJECTION, SOLUTION INTRAVENOUS
Status: DISCONTINUED | OUTPATIENT
Start: 2023-12-06 | End: 2023-12-14 | Stop reason: HOSPADM

## 2023-12-06 RX ORDER — NITROGLYCERIN 0.4 MG/1
0.4 TABLET SUBLINGUAL
Status: DISCONTINUED | OUTPATIENT
Start: 2023-12-06 | End: 2023-12-14 | Stop reason: HOSPADM

## 2023-12-07 ENCOUNTER — READMISSION MANAGEMENT (OUTPATIENT)
Dept: CALL CENTER | Facility: HOSPITAL | Age: 58
End: 2023-12-07
Payer: COMMERCIAL

## 2023-12-07 PROBLEM — J15.9 BACTERIAL PNEUMONIA: Status: ACTIVE | Noted: 2023-12-07

## 2023-12-07 PROBLEM — J47.9 BRONCHIECTASIS: Status: ACTIVE | Noted: 2023-12-07

## 2023-12-07 PROBLEM — D64.9 ANEMIA: Status: ACTIVE | Noted: 2023-12-07

## 2023-12-07 LAB
ABO GROUP BLD: NORMAL
ANION GAP SERPL CALCULATED.3IONS-SCNC: 12.3 MMOL/L (ref 5–15)
BASOPHILS # BLD AUTO: 0.02 10*3/MM3 (ref 0–0.2)
BASOPHILS NFR BLD AUTO: 0.1 % (ref 0–1.5)
BLD GP AB SCN SERPL QL: NEGATIVE
BUN SERPL-MCNC: 46 MG/DL (ref 6–20)
BUN/CREAT SERPL: 16.5 (ref 7–25)
CALCIUM SPEC-SCNC: 8.4 MG/DL (ref 8.6–10.5)
CHLORIDE SERPL-SCNC: 100 MMOL/L (ref 98–107)
CHLORIDE UR-SCNC: 41 MMOL/L
CO2 SERPL-SCNC: 21.7 MMOL/L (ref 22–29)
CREAT SERPL-MCNC: 2.79 MG/DL (ref 0.76–1.27)
CREAT UR-MCNC: 26.2 MG/DL
CREAT UR-MCNC: 35.1 MG/DL
DEPRECATED RDW RBC AUTO: 38.4 FL (ref 37–54)
EGFRCR SERPLBLD CKD-EPI 2021: 25.5 ML/MIN/1.73
EOSINOPHIL # BLD AUTO: 0.49 10*3/MM3 (ref 0–0.4)
EOSINOPHIL NFR BLD AUTO: 2.3 % (ref 0.3–6.2)
ERYTHROCYTE [DISTWIDTH] IN BLOOD BY AUTOMATED COUNT: 12.7 % (ref 12.3–15.4)
GLUCOSE BLDC GLUCOMTR-MCNC: 279 MG/DL (ref 70–130)
GLUCOSE BLDC GLUCOMTR-MCNC: 288 MG/DL (ref 70–130)
GLUCOSE BLDC GLUCOMTR-MCNC: 338 MG/DL (ref 70–130)
GLUCOSE BLDC GLUCOMTR-MCNC: 381 MG/DL (ref 70–130)
GLUCOSE SERPL-MCNC: 331 MG/DL (ref 65–99)
HCT VFR BLD AUTO: 22 % (ref 37.5–51)
HGB BLD-MCNC: 6.9 G/DL (ref 13–17.7)
IMM GRANULOCYTES # BLD AUTO: 0.36 10*3/MM3 (ref 0–0.05)
IMM GRANULOCYTES NFR BLD AUTO: 1.7 % (ref 0–0.5)
L PNEUMO1 AG UR QL IA: NEGATIVE
LYMPHOCYTES # BLD AUTO: 1.67 10*3/MM3 (ref 0.7–3.1)
LYMPHOCYTES NFR BLD AUTO: 7.7 % (ref 19.6–45.3)
MCH RBC QN AUTO: 26.8 PG (ref 26.6–33)
MCHC RBC AUTO-ENTMCNC: 31.4 G/DL (ref 31.5–35.7)
MCV RBC AUTO: 85.6 FL (ref 79–97)
MONOCYTES # BLD AUTO: 1.3 10*3/MM3 (ref 0.1–0.9)
MONOCYTES NFR BLD AUTO: 6 % (ref 5–12)
MRSA DNA SPEC QL NAA+PROBE: NORMAL
NEUTROPHILS NFR BLD AUTO: 17.76 10*3/MM3 (ref 1.7–7)
NEUTROPHILS NFR BLD AUTO: 82.2 % (ref 42.7–76)
NRBC BLD AUTO-RTO: 0.1 /100 WBC (ref 0–0.2)
PLATELET # BLD AUTO: 474 10*3/MM3 (ref 140–450)
PMV BLD AUTO: 8.9 FL (ref 6–12)
POTASSIUM SERPL-SCNC: 4.3 MMOL/L (ref 3.5–5.2)
PROT ?TM UR-MCNC: 66.6 MG/DL
PROT/CREAT UR: 2542 MG/G CREA (ref 0–200)
QT INTERVAL: 316 MS
QTC INTERVAL: 400 MS
RBC # BLD AUTO: 2.57 10*6/MM3 (ref 4.14–5.8)
RH BLD: NEGATIVE
S PNEUM AG SPEC QL LA: NEGATIVE
SODIUM SERPL-SCNC: 134 MMOL/L (ref 136–145)
SODIUM UR-SCNC: 47 MMOL/L
SODIUM UR-SCNC: 65 MMOL/L
T&S EXPIRATION DATE: NORMAL
WBC NRBC COR # BLD AUTO: 21.6 10*3/MM3 (ref 3.4–10.8)

## 2023-12-07 PROCEDURE — 94640 AIRWAY INHALATION TREATMENT: CPT

## 2023-12-07 PROCEDURE — 97530 THERAPEUTIC ACTIVITIES: CPT

## 2023-12-07 PROCEDURE — 82436 ASSAY OF URINE CHLORIDE: CPT | Performed by: INTERNAL MEDICINE

## 2023-12-07 PROCEDURE — 86901 BLOOD TYPING SEROLOGIC RH(D): CPT

## 2023-12-07 PROCEDURE — 25010000002 PIPERACILLIN SOD-TAZOBACTAM PER 1 G: Performed by: STUDENT IN AN ORGANIZED HEALTH CARE EDUCATION/TRAINING PROGRAM

## 2023-12-07 PROCEDURE — 86850 RBC ANTIBODY SCREEN: CPT | Performed by: HOSPITALIST

## 2023-12-07 PROCEDURE — 63710000001 INSULIN GLARGINE PER 5 UNITS: Performed by: NURSE PRACTITIONER

## 2023-12-07 PROCEDURE — 84156 ASSAY OF PROTEIN URINE: CPT | Performed by: INTERNAL MEDICINE

## 2023-12-07 PROCEDURE — 86900 BLOOD TYPING SEROLOGIC ABO: CPT

## 2023-12-07 PROCEDURE — P9016 RBC LEUKOCYTES REDUCED: HCPCS

## 2023-12-07 PROCEDURE — 63710000001 INSULIN LISPRO (HUMAN) PER 5 UNITS: Performed by: HOSPITALIST

## 2023-12-07 PROCEDURE — 97162 PT EVAL MOD COMPLEX 30 MIN: CPT

## 2023-12-07 PROCEDURE — 87205 SMEAR GRAM STAIN: CPT | Performed by: STUDENT IN AN ORGANIZED HEALTH CARE EDUCATION/TRAINING PROGRAM

## 2023-12-07 PROCEDURE — 84300 ASSAY OF URINE SODIUM: CPT | Performed by: INTERNAL MEDICINE

## 2023-12-07 PROCEDURE — 93010 ELECTROCARDIOGRAM REPORT: CPT | Performed by: INTERNAL MEDICINE

## 2023-12-07 PROCEDURE — 87899 AGENT NOS ASSAY W/OPTIC: CPT | Performed by: STUDENT IN AN ORGANIZED HEALTH CARE EDUCATION/TRAINING PROGRAM

## 2023-12-07 PROCEDURE — 80048 BASIC METABOLIC PNL TOTAL CA: CPT | Performed by: NURSE PRACTITIONER

## 2023-12-07 PROCEDURE — 97535 SELF CARE MNGMENT TRAINING: CPT

## 2023-12-07 PROCEDURE — 93005 ELECTROCARDIOGRAM TRACING: CPT | Performed by: NURSE PRACTITIONER

## 2023-12-07 PROCEDURE — 86923 COMPATIBILITY TEST ELECTRIC: CPT

## 2023-12-07 PROCEDURE — 86901 BLOOD TYPING SEROLOGIC RH(D): CPT | Performed by: HOSPITALIST

## 2023-12-07 PROCEDURE — 86900 BLOOD TYPING SEROLOGIC ABO: CPT | Performed by: HOSPITALIST

## 2023-12-07 PROCEDURE — 94799 UNLISTED PULMONARY SVC/PX: CPT

## 2023-12-07 PROCEDURE — 97166 OT EVAL MOD COMPLEX 45 MIN: CPT

## 2023-12-07 PROCEDURE — 82570 ASSAY OF URINE CREATININE: CPT | Performed by: INTERNAL MEDICINE

## 2023-12-07 PROCEDURE — 94761 N-INVAS EAR/PLS OXIMETRY MLT: CPT

## 2023-12-07 PROCEDURE — 25010000002 LINEZOLID 600 MG/300ML SOLUTION: Performed by: NURSE PRACTITIONER

## 2023-12-07 PROCEDURE — 87449 NOS EACH ORGANISM AG IA: CPT | Performed by: STUDENT IN AN ORGANIZED HEALTH CARE EDUCATION/TRAINING PROGRAM

## 2023-12-07 PROCEDURE — 36430 TRANSFUSION BLD/BLD COMPNT: CPT

## 2023-12-07 PROCEDURE — 63710000001 INSULIN LISPRO (HUMAN) PER 5 UNITS: Performed by: NURSE PRACTITIONER

## 2023-12-07 PROCEDURE — 99223 1ST HOSP IP/OBS HIGH 75: CPT | Performed by: STUDENT IN AN ORGANIZED HEALTH CARE EDUCATION/TRAINING PROGRAM

## 2023-12-07 PROCEDURE — 82948 REAGENT STRIP/BLOOD GLUCOSE: CPT

## 2023-12-07 PROCEDURE — 85025 COMPLETE CBC W/AUTO DIFF WBC: CPT | Performed by: NURSE PRACTITIONER

## 2023-12-07 PROCEDURE — 87641 MR-STAPH DNA AMP PROBE: CPT | Performed by: STUDENT IN AN ORGANIZED HEALTH CARE EDUCATION/TRAINING PROGRAM

## 2023-12-07 RX ORDER — MONTELUKAST SODIUM 10 MG/1
10 TABLET ORAL DAILY
Status: DISCONTINUED | OUTPATIENT
Start: 2023-12-07 | End: 2023-12-14 | Stop reason: HOSPADM

## 2023-12-07 RX ORDER — IPRATROPIUM BROMIDE AND ALBUTEROL SULFATE 2.5; .5 MG/3ML; MG/3ML
3 SOLUTION RESPIRATORY (INHALATION)
Status: DISCONTINUED | OUTPATIENT
Start: 2023-12-07 | End: 2023-12-14 | Stop reason: HOSPADM

## 2023-12-07 RX ORDER — BUDESONIDE AND FORMOTEROL FUMARATE DIHYDRATE 160; 4.5 UG/1; UG/1
2 AEROSOL RESPIRATORY (INHALATION)
Status: DISCONTINUED | OUTPATIENT
Start: 2023-12-07 | End: 2023-12-14 | Stop reason: HOSPADM

## 2023-12-07 RX ORDER — ALBUTEROL SULFATE 2.5 MG/3ML
2.5 SOLUTION RESPIRATORY (INHALATION) EVERY 6 HOURS PRN
Status: DISCONTINUED | OUTPATIENT
Start: 2023-12-07 | End: 2023-12-14 | Stop reason: HOSPADM

## 2023-12-07 RX ORDER — ALBUTEROL SULFATE 2.5 MG/3ML
2.5 SOLUTION RESPIRATORY (INHALATION)
Status: DISCONTINUED | OUTPATIENT
Start: 2023-12-07 | End: 2023-12-07

## 2023-12-07 RX ORDER — METOPROLOL TARTRATE 50 MG/1
100 TABLET, FILM COATED ORAL 2 TIMES DAILY
Status: DISCONTINUED | OUTPATIENT
Start: 2023-12-07 | End: 2023-12-14 | Stop reason: HOSPADM

## 2023-12-07 RX ORDER — FAMOTIDINE 20 MG/1
40 TABLET, FILM COATED ORAL EVERY MORNING
Status: DISCONTINUED | OUTPATIENT
Start: 2023-12-07 | End: 2023-12-14 | Stop reason: HOSPADM

## 2023-12-07 RX ORDER — IPRATROPIUM BROMIDE AND ALBUTEROL SULFATE 2.5; .5 MG/3ML; MG/3ML
3 SOLUTION RESPIRATORY (INHALATION)
Status: DISCONTINUED | OUTPATIENT
Start: 2023-12-07 | End: 2023-12-07

## 2023-12-07 RX ORDER — GABAPENTIN 300 MG/1
300 CAPSULE ORAL EVERY 8 HOURS SCHEDULED
Status: DISCONTINUED | OUTPATIENT
Start: 2023-12-07 | End: 2023-12-14 | Stop reason: HOSPADM

## 2023-12-07 RX ORDER — ACETAMINOPHEN 650 MG/1
650 SUPPOSITORY RECTAL ONCE
Status: COMPLETED | OUTPATIENT
Start: 2023-12-07 | End: 2023-12-07

## 2023-12-07 RX ORDER — SODIUM CHLORIDE FOR INHALATION 7 %
4 VIAL, NEBULIZER (ML) INHALATION
Status: DISCONTINUED | OUTPATIENT
Start: 2023-12-07 | End: 2023-12-14 | Stop reason: HOSPADM

## 2023-12-07 RX ORDER — FINASTERIDE 5 MG/1
5 TABLET, FILM COATED ORAL DAILY
Status: DISCONTINUED | OUTPATIENT
Start: 2023-12-07 | End: 2023-12-14 | Stop reason: HOSPADM

## 2023-12-07 RX ORDER — DOCUSATE SODIUM 100 MG/1
100 CAPSULE, LIQUID FILLED ORAL DAILY
Status: DISCONTINUED | OUTPATIENT
Start: 2023-12-07 | End: 2023-12-14 | Stop reason: HOSPADM

## 2023-12-07 RX ORDER — ACETAMINOPHEN 325 MG/1
650 TABLET ORAL ONCE
Status: COMPLETED | OUTPATIENT
Start: 2023-12-07 | End: 2023-12-07

## 2023-12-07 RX ORDER — ATORVASTATIN CALCIUM 20 MG/1
20 TABLET, FILM COATED ORAL NIGHTLY
Status: DISCONTINUED | OUTPATIENT
Start: 2023-12-07 | End: 2023-12-14 | Stop reason: HOSPADM

## 2023-12-07 RX ORDER — ACETAMINOPHEN 160 MG/5ML
650 SOLUTION ORAL ONCE
Status: COMPLETED | OUTPATIENT
Start: 2023-12-07 | End: 2023-12-07

## 2023-12-07 RX ORDER — INSULIN LISPRO 100 [IU]/ML
3-14 INJECTION, SOLUTION INTRAVENOUS; SUBCUTANEOUS
Status: DISCONTINUED | OUTPATIENT
Start: 2023-12-07 | End: 2023-12-12

## 2023-12-07 RX ADMIN — METOPROLOL TARTRATE 100 MG: 50 TABLET, FILM COATED ORAL at 08:12

## 2023-12-07 RX ADMIN — PIPERACILLIN SODIUM AND TAZOBACTAM SODIUM 4.5 G: 4; .5 INJECTION, SOLUTION INTRAVENOUS at 23:05

## 2023-12-07 RX ADMIN — MONTELUKAST SODIUM 10 MG: 10 TABLET, FILM COATED ORAL at 08:12

## 2023-12-07 RX ADMIN — LINEZOLID 600 MG: 600 INJECTION, SOLUTION INTRAVENOUS at 01:33

## 2023-12-07 RX ADMIN — INSULIN LISPRO 8 UNITS: 100 INJECTION, SOLUTION INTRAVENOUS; SUBCUTANEOUS at 21:01

## 2023-12-07 RX ADMIN — MUPIROCIN 1 APPLICATION: 20 OINTMENT TOPICAL at 21:01

## 2023-12-07 RX ADMIN — ATORVASTATIN CALCIUM 20 MG: 20 TABLET, FILM COATED ORAL at 21:00

## 2023-12-07 RX ADMIN — IPRATROPIUM BROMIDE AND ALBUTEROL SULFATE 3 ML: 2.5; .5 SOLUTION RESPIRATORY (INHALATION) at 15:14

## 2023-12-07 RX ADMIN — ZINC OXIDE 1 APPLICATION: 200 OINTMENT TOPICAL at 21:01

## 2023-12-07 RX ADMIN — BUDESONIDE AND FORMOTEROL FUMARATE DIHYDRATE 2 PUFF: 160; 4.5 AEROSOL RESPIRATORY (INHALATION) at 21:09

## 2023-12-07 RX ADMIN — GABAPENTIN 300 MG: 300 CAPSULE ORAL at 13:52

## 2023-12-07 RX ADMIN — FINASTERIDE 5 MG: 5 TABLET, FILM COATED ORAL at 08:12

## 2023-12-07 RX ADMIN — ACETAMINOPHEN 650 MG: 325 TABLET, FILM COATED ORAL at 11:52

## 2023-12-07 RX ADMIN — Medication 4 ML: at 21:09

## 2023-12-07 RX ADMIN — INSULIN GLARGINE 30 UNITS: 100 INJECTION, SOLUTION SUBCUTANEOUS at 21:01

## 2023-12-07 RX ADMIN — GABAPENTIN 300 MG: 300 CAPSULE ORAL at 23:05

## 2023-12-07 RX ADMIN — PIPERACILLIN SODIUM AND TAZOBACTAM SODIUM 4.5 G: 4; .5 INJECTION, SOLUTION INTRAVENOUS at 09:55

## 2023-12-07 RX ADMIN — INSULIN LISPRO 8 UNITS: 100 INJECTION, SOLUTION INTRAVENOUS; SUBCUTANEOUS at 17:01

## 2023-12-07 RX ADMIN — IPRATROPIUM BROMIDE AND ALBUTEROL SULFATE 3 ML: 2.5; .5 SOLUTION RESPIRATORY (INHALATION) at 12:06

## 2023-12-07 RX ADMIN — GABAPENTIN 300 MG: 300 CAPSULE ORAL at 06:36

## 2023-12-07 RX ADMIN — IPRATROPIUM BROMIDE AND ALBUTEROL SULFATE 3 ML: 2.5; .5 SOLUTION RESPIRATORY (INHALATION) at 09:13

## 2023-12-07 RX ADMIN — IPRATROPIUM BROMIDE AND ALBUTEROL SULFATE 3 ML: 2.5; .5 SOLUTION RESPIRATORY (INHALATION) at 21:09

## 2023-12-07 RX ADMIN — PIPERACILLIN SODIUM AND TAZOBACTAM SODIUM 4.5 G: 4; .5 INJECTION, SOLUTION INTRAVENOUS at 16:19

## 2023-12-07 RX ADMIN — METOPROLOL TARTRATE 100 MG: 50 TABLET, FILM COATED ORAL at 21:00

## 2023-12-07 RX ADMIN — BUDESONIDE AND FORMOTEROL FUMARATE DIHYDRATE 2 PUFF: 160; 4.5 AEROSOL RESPIRATORY (INHALATION) at 09:12

## 2023-12-07 RX ADMIN — INSULIN LISPRO 8 UNITS: 100 INJECTION, SOLUTION INTRAVENOUS; SUBCUTANEOUS at 08:12

## 2023-12-07 RX ADMIN — INSULIN LISPRO 10 UNITS: 100 INJECTION, SOLUTION INTRAVENOUS; SUBCUTANEOUS at 11:53

## 2023-12-07 RX ADMIN — FAMOTIDINE 40 MG: 20 TABLET ORAL at 06:36

## 2023-12-07 NOTE — CONSULTS
I was requested to see patient regarding an Advance Directive.  Patient has copy in chart and does not want to make any changes.

## 2023-12-07 NOTE — CONSULTS
Patient Identification:  Preston Wallis  58 y.o.  male  1965  [id number]          LOS 1    Requesting physician: Dr Eller    Reason for Consultation: Pneumonia    History of Present Illness:   58-year-old male admitted to hospital service with shortness of breath.  Has history of COPD, CHF with chronic hypoxemic respiratory failure.  He uses 2 L supplemental oxygen at home.  Transferred from outlying facility.  Sputum cultures grew Achromobacter.  Infectious disease following and managing antibiotics.  Complains of moderate cough and shortness of breath worse with activity.  No chest pain.  Complains of wheezing.    Past Medical History:  Past Medical History:   Diagnosis Date    Age-related cognitive decline     Allergic contact dermatitis     Allergies     Anemia     Bronchiectasis with acute lower respiratory infection     Charcot foot due to diabetes mellitus 9/10/2013    Chronic diastolic (congestive) heart failure     Chronic kidney disease     Chronic respiratory failure with hypoxia     Closed supracondylar fracture of femur 1/12/2022    COPD (chronic obstructive pulmonary disease)     Deep vein thrombosis (DVT) of lower extremity associated with air travel 1/13/2023    Dependence on supplemental oxygen     Eczema     Erectile dysfunction     due to organic reasons    Essential (primary) hypertension     Fracture     closed fracture of other tarsal and metatarsal bones    Fracture of proximal humerus 1/13/2023    GERD without esophagitis     High risk medication use     Hypercholesteremia     Hypomagnesemia     Infected stasis ulcer of left lower extremity 1/13/2023    Insomnia     Low back pain     Major depressive disorder     Morbid (severe) obesity due to excess calories     MRSA pneumonia     Muscle weakness     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 without evidence of necrosis     Obstructive sleep apnea (adult) (pediatric)     Other forms of  dyspnea     Other long term (current) drug therapy     Other specified noninfective gastroenteritis and colitis     Other spondylosis, lumbar region     Pain in both knees     Paroxysmal atrial fibrillation     Peripheral neuropathy     attributed to type 2 diabetes    Pneumonia, unspecified organism     Polyneuropathy     Rash and other nonspecific skin eruption     Syncope and collapse     Tachycardia     Tinnitus 1/13/2023    Type 1 diabetes mellitus with diabetic chronic kidney disease     Type 2 diabetes mellitus     Unspecified fall, initial encounter     Urinary retention        Past Surgical History:  Past Surgical History:   Procedure Laterality Date    CHOLECYSTECTOMY      CYSTOSCOPY      FEMUR SURGERY Left     Shravan placed    KNEE SURGERY Left     OTHER SURGICAL HISTORY Left     venous port    TONSILLECTOMY AND ADENOIDECTOMY          Home Meds:  Medications Prior to Admission   Medication Sig Dispense Refill Last Dose    atorvastatin (LIPITOR) 20 MG tablet Take 1 tablet by mouth Every Night. 90 tablet 2 12/5/2023    docusate sodium (COLACE) 100 MG capsule Take 1 capsule by mouth Daily. 90 capsule 2 12/5/2023    DULoxetine (CYMBALTA) 60 MG capsule Take 1 capsule by mouth 2 (Two) Times a Day. 180 capsule 2 12/5/2023    finasteride (PROSCAR) 5 MG tablet Take 1 tablet by mouth Daily. 90 tablet 2 12/5/2023    folic acid (FOLVITE) 1 MG tablet Take 1 tablet by mouth Daily. 90 tablet 2 12/6/2023    gabapentin (NEURONTIN) 300 MG capsule TAKE ONE CAPSULE BY MOUTH EVERY MORNING AND TAKE TWO CAPSULES BY MOUTH EVERY NIGHT AT BEDTIME 270 capsule 2 12/6/2023    metoprolol tartrate (LOPRESSOR) 100 MG tablet Take 1 tablet by mouth 2 (Two) Times a Day. 189 tablet 2 12/6/2023    montelukast (SINGULAIR) 10 MG tablet Take 1 tablet by mouth every night at bedtime. 90 tablet 2 12/6/2023    apixaban (Eliquis) 5 MG tablet tablet Take 1 tablet by mouth Every 12 (Twelve) Hours. (Patient taking differently: Take 1 tablet by mouth  Every 12 (Twelve) Hours. [facility]) 180 tablet 2 Unknown    B-D UF III MINI PEN NEEDLES 31G X 5 MM misc USE FOUR TIMES DAILY BEFORE MEALS AND AT BEDTIME 360 each 1 Unknown    Budeson-Glycopyrrol-Formoterol (Breztri Aerosphere) 160-9-4.8 MCG/ACT aerosol inhaler    Unknown    bumetanide (BUMEX) 2 MG tablet Take 1 tablet BY MOUTH TWICE DAILY. call cardiology IF weight is greater THAN 2 pounds in 1 DAY OR 5 pounds in A WEEK. (Patient taking differently: Take 1 tablet BY MOUTH TWICE DAILY. call cardiology IF weight is greater THAN 2 pounds in 1 DAY OR 5 pounds in A WEEK.    To be discontinued by MD at [facility]) 180 tablet 2 Unknown    calcium citrate (CALCITRATE) 950 (200 Ca) MG tablet Take 1 tablet by mouth Daily. 90 tablet 2 Unknown    Cholecalciferol (Vitamin D3) 50 MCG (2000 UT) tablet Take 1 tablet by mouth Daily. 90 tablet 2 Unknown    Continuous Blood Gluc  (FreeStyle [place] 2 [place]) device    Unknown    Continuous Blood Gluc Sensor (FreeStyle Bethany 2 Sensor) misc USE every 14 DAYS 2 each 11 Unknown    dapagliflozin (Farxiga) 5 MG tablet tablet Take 1 tablet by mouth Daily. 90 tablet 2 Unknown    exenatide er (Bydureon BCise) 2 MG/0.85ML auto-injector injection Inject 0.85 mL under the skin into the appropriate area as directed 1 (One) Time Per Week.   Unknown    famotidine (PEPCID) 40 MG tablet Take 1 tablet by mouth Every Morning. 90 tablet 2 Unknown    ferrous gluconate (FERGON) 324 MG tablet Take 1 tablet by mouth Daily With Breakfast. 90 tablet 2 Unknown    Fluticasone-Salmeterol (ADVAIR/WIXELA) 500-50 MCG/ACT DISKUS Inhale 1 puff 2 (Two) Times a Day.   Unknown    glucose blood test strip 1 each by Other route Daily. Dx:   E11.40 100 each 4 Unknown    Insulin Lispro, 1 Unit Dial, (HUMALOG) 100 UNIT/ML solution pen-injector inject EIGHT units UNDER THE SKIN INTO THE APPROPRIATE AREA THREE TIMES DAILY PER sliding scale, IF BLOOD SUGAR < 160= 0 units, 161-220= 2 units, 221-280= 4 units,  281-340 = SIX units, 341-400 = EIGHT units 15 mL 1 Unknown    Lantus SoloStar 100 UNIT/ML injection pen INJECT 40 UNITS UNDER THE SKIN ONCE DAILY 15 mL 1 Unknown    losartan (COZAAR) 25 MG tablet Take 1 tablet by mouth Every Morning. To be discontinued by MD at [facility]   Unknown    magnesium oxide (MAG-OX) 400 tablet tablet Take 1 tablet by mouth Daily. Started by [facility]   Unknown    NIFEdipine XL (PROCARDIA XL) 30 MG 24 hr tablet Take 1 tablet by mouth Every Morning. (Patient taking differently: Take 1 tablet by mouth Every Morning. To be discontinued by MD at [facility]) 90 tablet 2 Unknown    O2 (OXYGEN) 2 Liter O2 - CONTINUOUS (route: Oxygen)       ondansetron ODT (ZOFRAN-ODT) 4 MG disintegrating tablet PLACE 1 TABLET ON THE TONGUE EVERY 8 HOURS AS NEEDED FOR NAUSEA AND VOMITING 10 tablet 0 Unknown    Semaglutide, 1 MG/DOSE, (Ozempic, 1 MG/DOSE,) 4 MG/3ML solution pen-injector Inject 1 mg under the skin into the appropriate area as directed 1 (One) Time Per Week. 3 mL 5 Unknown    TRUEplus Lancets 28G misc USE AS DIRECTED 100 each 0 Unknown    Ventolin  (90 Base) MCG/ACT inhaler INHALE 2 PUFFS FOUR TIMES DAILY AS NEEDED FOR WHEEZING 18 g 11          Allergies:  Allergies   Allergen Reactions    Benadryl [Diphenhydramine] Itching    Proventil [Albuterol] Other (See Comments)     Mouth sores         Social History:   Social History     Socioeconomic History    Marital status:    Tobacco Use    Smoking status: Former     Packs/day: 1.00     Years: 12.00     Additional pack years: 0.00     Total pack years: 12.00     Types: Cigarettes     Start date:      Quit date:      Years since quittin.9    Smokeless tobacco: Never   Vaping Use    Vaping Use: Never used   Substance and Sexual Activity    Alcohol use: Not Currently    Drug use: Never    Sexual activity: Defer       Family History:  Family History   Problem Relation Age of Onset    Coronary artery disease Mother     Hypertension Mother      "Diabetes type II Mother     Asthma Father     Diabetes type II Sister     Cancer Sister        Review of Systems:  Denies fevers or chills  Denies nausea or vomiting  No new vision or hearing changes  No chest pain  Cough and shortness of breath  No diarrhea, hematemesis or hematochezia, no dysuria or frequency  No musculoskeletal complaints  No heat or cold intolerance  No skin rashes  No dizziness or confusion.  No seizure activity  No new anxiety or depression  12 system review of systems performed and all else negative    Objective:    PHYSICAL EXAM:    /64 (BP Location: Left arm, Patient Position: Lying)   Pulse 96   Temp 97.9 °F (36.6 °C) (Oral)   Resp 16   Ht 175.3 cm (69\")   Wt 126 kg (277 lb 12.5 oz)   SpO2 100%   BMI 41.02 kg/m²  Body mass index is 41.02 kg/m². 100% 126 kg (277 lb 12.5 oz)    GENERAL APPEARANCE:   Well developed  Well nourished  No acute distress   EYES:    PERRL                                                                           Conjunctivae normal  Sclerae nonicteric.  HENT:   Atraumatic, normocephalic  External ears and nose normal  Moist mucous membranes and no ulcers  NECK:  Thyroid not enlarged  Trachea midline   RESPIRATORY:    Nonlabored breathing   Diminished bilateral breath sounds  Rhonchi at bases right greater than left. + wheezing  No dullness  CARDIOVASCULAR:    RRR  Normal S1, S2  No murmur  Lower extremity edema: none    GI:   Bowel sounds normal  Abdomen soft , nondistended, nontender  No abdominal masses  MUSCULOSKELETAL:  Normal movement of extremities  No tenderness, no deformities  No clubbing or cyanosis   Skin:    No visible rashes  No palpable nodules  Cap refill normal.  No mottling.   PSYCHIATRIC:  Speech and behavior appropriate  Normal mood and affect  Oriented to person, place and time  NEUROLOGIC:  Cranial nerves II through XII grossly intact.  Sensation intact.      Lab Review:   Results from last 7 days   Lab Units 12/07/23  0833 " 12/06/23  2132   WBC 10*3/mm3 21.60* 24.82*   HEMOGLOBIN g/dL 6.9* 7.7*   HEMATOCRIT % 22.0* 23.3*   PLATELETS 10*3/mm3 474* 435     Results from last 7 days   Lab Units 12/07/23  0833 12/06/23  2132   SODIUM mmol/L 134* 138   POTASSIUM mmol/L 4.3 4.0   CHLORIDE mmol/L 100 101   CO2 mmol/L 21.7* 24.2   BUN mg/dL 46* 48*   CREATININE mg/dL 2.79* 3.09*   CALCIUM mg/dL 8.4* 8.5*   BILIRUBIN mg/dL  --  0.3   ALK PHOS U/L  --  124*   ALT (SGPT) U/L  --  22   AST (SGOT) U/L  --  17   GLUCOSE mg/dL 331* 151*                       Imaging reviewed  chest X-ray 12/7/2023 reviewed shows right basilar airspace disease.          CT chest 11/28 reviewed shows right lower lobe infiltrate with cystic bronchiectasis and chronic scarring right upper lobe.  Cardiomegaly.         Assessment:  Right lower lobe pneumonia  Bronchiectasis with acute exacerbation  COPD with acute exacerbation  Acute on chronic hypoxemic respiratory failure  Acute kidney injury on chronic kidney disease  Morbid obesity: BMI 41  Type 2 diabetes    Recommendations:  Agree with antibiotic changes.  Sending sputum for culture and narrow based on culture results.  Add hypertonic saline nebs to help with clearance.  Bronchodilators for COPD exacerbation.  Increase frequency.  Wean oxygen as able.        Thank you for allowing me to participate in the care of this patient.  I will continue to follow along with you.      Nathan Richards MD  [facility], [facility]  Pulmonary and Critical Care Medicine    12/7/2023  10:11 EST

## 2023-12-07 NOTE — NURSING NOTE
Wound/Ostomy: Consult received regarding skin issue on left gluteal area and legs. Patient alert, sitting in the chair with the lower extremities elevated, upon assessment we could see redness, blanchable, excoriation and satellite lesions, possibly related to moisture and fungal infection associated on buttocks and Coccyx. In addition, scab on leg was observed, which was removed, leaving the skin almost healthy and another scab rt medial abdomen. Bactroban was ordered.  Wound care order and pressure injury standing measures have been implemented into Epic.  Please add Accumax pump and reconsult for any additional needs.

## 2023-12-07 NOTE — THERAPY EVALUATION
Patient Name: Preston Wallis  : 1965    MRN: [id number]                              Today's Date: 2023       Admit Date: 2023    Visit Dx: No diagnosis found.  Patient Active Problem List   Diagnosis    Polyneuropathy    Paroxysmal atrial fibrillation    Obstructive sleep apnea    MRSA pneumonia    Low back pain    Chronic diastolic heart failure    Allergies    COPD exacerbation    Chronic anticoagulation    Benign prostatic hyperplasia    Impaired mobility and endurance    Stage 3a chronic kidney disease    Iron deficiency anemia secondary to inadequate dietary iron intake    Vitamin D deficiency    Class 3 severe obesity with serious comorbidity in adult    Lower extremity edema    Elevated alkaline phosphatase level    Venous insufficiency (chronic) (peripheral)    Tobacco abuse, in remission    History of Pseudomonas pneumonia    Chronic dyspnea    Gastroesophageal reflux disease    Bronchiectasis without complication    ERIC (acute kidney injury)    Altered mental status    Hyperlipidemia    Luetscher's syndrome    Neurogenic bladder    Class 1 obesity    Pneumonia due to Pseudomonas species    Seizures    Primary osteoarthritis of left knee    Other constipation    Chronic obstructive pulmonary disease    Type 2 DM with CKD stage 3 and hypertension    Essential hypertension    Stage 3b chronic kidney disease    Annual physical exam    Long-term use of high-risk medication    Personal history of PE (pulmonary embolism)    Encounter for aftercare for healing closed traumatic fracture of left femur    Chronic pain of left knee    Primary osteoarthritis of right knee    Sepsis    Bronchiectasis    Bacterial pneumonia    Anemia     Past Medical History:   Diagnosis Date    Age-related cognitive decline     Allergic contact dermatitis     Allergies     Anemia     Bronchiectasis with acute lower respiratory infection     Charcot foot due to diabetes mellitus 9/10/2013    Chronic diastolic  (congestive) heart failure     Chronic kidney disease     Chronic respiratory failure with hypoxia     Closed supracondylar fracture of femur 1/12/2022    COPD (chronic obstructive pulmonary disease)     Deep vein thrombosis (DVT) of lower extremity associated with air travel 1/13/2023    Dependence on supplemental oxygen     Eczema     Erectile dysfunction     due to organic reasons    Essential (primary) hypertension     Fracture     closed fracture of other tarsal and metatarsal bones    Fracture of proximal humerus 1/13/2023    GERD without esophagitis     High risk medication use     Hypercholesteremia     Hypomagnesemia     Infected stasis ulcer of left lower extremity 1/13/2023    Insomnia     Low back pain     Major depressive disorder     Morbid (severe) obesity due to excess calories     MRSA pneumonia     Muscle weakness     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 without evidence of necrosis     Obstructive sleep apnea (adult) (pediatric)     Other forms of dyspnea     Other long term (current) drug therapy     Other specified noninfective gastroenteritis and colitis     Other spondylosis, lumbar region     Pain in both knees     Paroxysmal atrial fibrillation     Peripheral neuropathy     attributed to type 2 diabetes    Pneumonia, unspecified organism     Polyneuropathy     Rash and other nonspecific skin eruption     Syncope and collapse     Tachycardia     Tinnitus 1/13/2023    Type 1 diabetes mellitus with diabetic chronic kidney disease     Type 2 diabetes mellitus     Unspecified fall, initial encounter     Urinary retention      Past Surgical History:   Procedure Laterality Date    CHOLECYSTECTOMY      CYSTOSCOPY      FEMUR SURGERY Left     Shravan placed    KNEE SURGERY Left     OTHER SURGICAL HISTORY Left     venous port    TONSILLECTOMY AND ADENOIDECTOMY        General Information       Row Name 12/07/23 1135          Physical Therapy Time and Intention     Document Type evaluation  -ER     Mode of Treatment individual therapy;physical therapy  -ER       Row Name 12/07/23 1135          General Information    Patient Profile Reviewed yes  -ER     Prior Level of Function --  Needs assist for dressing and bathing, reports that ~3 months ago he was able to ambulate short distances with a RW. Family reports that he typically uses W/C within the home and transfers independently from w/c to various surfaces  -ER     Existing Precautions/Restrictions fall;oxygen therapy device and L/min  2L o2 at BL  -ER     Barriers to Rehab medically complex  -ER       Row Name 12/07/23 1135          Living Environment    People in Home child(chon), adult;spouse  -ER       Row Name 12/07/23 1135          Home Main Entrance    Number of Stairs, Main Entrance none  -ER       Row Name 12/07/23 1135          Stairs Within Home, Primary    Stairs, Within Home, Primary w/c accessible  -ER       Row Name 12/07/23 1135          Cognition    Orientation Status (Cognition) oriented x 4  -ER       Row Name 12/07/23 1135          Safety Issues, Functional Mobility    Impairments Affecting Function (Mobility) balance;endurance/activity tolerance;strength;shortness of breath;range of motion (ROM)  -ER               User Key  (r) = Recorded By, (t) = Taken By, (c) = Cosigned By      Initials Name Provider Type    ER Cat Burris, EMMANUELLE Physical Therapist                   Mobility       Row Name 12/07/23 1137          Bed Mobility    Comment, (Bed Mobility) UIC  -ER       Row Name 12/07/23 1137          Transfers    Comment, (Transfers) does not use AD for t/f at BL  -ER       Row Name 12/07/23 1137          Sit-Stand Transfer    Sit-Stand Johnson (Transfers) minimum assist (75% patient effort);verbal cues  -ER               User Key  (r) = Recorded By, (t) = Taken By, (c) = Cosigned By      Initials Name Provider Type    ER Cat Burris, PT Physical Therapist                   Obj/Interventions        Row Name 12/07/23 1137          Range of Motion Comprehensive    Comment, General Range of Motion BLE lacking ~5 deg extension, otherwise WFL  -ER       Row Name 12/07/23 1137          Strength Comprehensive (MMT)    Comment, General Manual Muscle Testing (MMT) Assessment BLE ~4-/5, significant difficulty with PF/DF and knee extension bilaterally  -ER       Row Name 12/07/23 1137          Balance    Balance Assessment sitting static balance;sitting dynamic balance;sit to stand dynamic balance  -ER     Static Sitting Balance supervision  -ER     Dynamic Sitting Balance supervision;contact guard  -ER     Position, Sitting Balance sitting in chair  -ER     Sit to Stand Dynamic Balance minimal assist  -ER     Static Standing Balance contact guard  -ER     Dynamic Standing Balance minimal assist;moderate assist  -ER       Row Name 12/07/23 1137          Sensory Assessment (Somatosensory)    Sensory Assessment (Somatosensory) sensation intact  -ER               User Key  (r) = Recorded By, (t) = Taken By, (c) = Cosigned By      Initials Name Provider Type    ER Fatuma, Cat, PT Physical Therapist                   Goals/Plan       Row Name 12/07/23 1143          Bed Mobility Goal 1 (PT)    Activity/Assistive Device (Bed Mobility Goal 1, PT) bed mobility activities, all  -ER     Temple Level/Cues Needed (Bed Mobility Goal 1, PT) supervision required  -ER     Time Frame (Bed Mobility Goal 1, PT) 2 weeks  -ER       Row Name 12/07/23 1143          Transfer Goal 1 (PT)    Activity/Assistive Device (Transfer Goal 1, PT) transfers, all  -ER     Temple Level/Cues Needed (Transfer Goal 1, PT) supervision required  -ER     Time Frame (Transfer Goal 1, PT) 2 weeks  -ER       Row Name 12/07/23 1143          Gait Training Goal 1 (PT)    Activity/Assistive Device (Gait Training Goal 1, PT) gait (walking locomotion)  -ER     Temple Level (Gait Training Goal 1, PT) contact guard required;minimum assist (75% or more  patient effort)  -ER     Distance (Gait Training Goal 1, PT) 6  -ER     Time Frame (Gait Training Goal 1, PT) 2 weeks  -ER       Row Name 12/07/23 1143          Therapy Assessment/Plan (PT)    Planned Therapy Interventions (PT) balance training;bed mobility training;gait training;home exercise program;patient/family education;strengthening;ROM (range of motion);transfer training;wheelchair management/propulsion training  -ER               User Key  (r) = Recorded By, (t) = Taken By, (c) = Cosigned By      Initials Name Provider Type    ER Cat Burris, PT Physical Therapist                   Clinical Impression       Row Name 12/07/23 1139          Pain    Pretreatment Pain Rating 5/10  -ER     Posttreatment Pain Rating 5/10  -ER     Pain Location - Side/Orientation Left  -ER     Pain Location lower  -ER     Pain Location - extremity  -ER     Pre/Posttreatment Pain Comment LLE pain when standing  -ER     Pain Intervention(s) Rest;Repositioned;Ambulation/increased activity  -ER       Row Name 12/07/23 1133          Plan of Care Review    Plan of Care Reviewed With patient;family  -ER     Outcome Evaluation Preston Wallis is a 58 year old male with PMH of PNA, sepsis, COPD exacerbation, DM, CHF, morbid obesity, HTN, A-fib, and abdominal wall abscess. Pt. on 2L o2 via NC at baseline. Pt. lives in a one level home with his spouse and adult children. He reports that he has had HH in the past and was able to walk short distances with a RW and w/c follow. Now, he is using a wheelchair and transferring independently without the use of an AD. He reports 2-3 falls within the past year while getting out of bed and may have hit head several times per pt. family. Pt. did not seek medical attention following falls. Today, pt. performs 3x STS from bedside chair, and was able to tolerate each stance for ~1 min. Pt. has R shoulder pain so use of UE for support is difficult for pt. Pt. able to clear feet 2x with small range march with  bilateral UE support as well. Pt. with some signs of SOB however SPo2 >90%. PT recommending short term SNF stay vs HH pending progress to improve overall ease of mobility.  -ER       Row Name 12/07/23 1139          Therapy Assessment/Plan (PT)    Criteria for Skilled Interventions Met (PT) yes  -ER     Therapy Frequency (PT) 5 times/wk  -ER       Row Name 12/07/23 1139          Positioning and Restraints    Pre-Treatment Position sitting in chair/recliner  -ER     Post Treatment Position chair  -ER     In Chair notified nsg;with family/caregiver;call light within reach;encouraged to call for assist;exit alarm on  -ER               User Key  (r) = Recorded By, (t) = Taken By, (c) = Cosigned By      Initials Name Provider Type    Cat Curiel, EMMANUELLE Physical Therapist                   Outcome Measures       Row Name 12/07/23 1143 12/07/23 0812       How much help from another person do you currently need...    Turning from your back to your side while in flat bed without using bedrails? 3  -ER 3  -KE    Moving from lying on back to sitting on the side of a flat bed without bedrails? 3  -ER 3  -KE    Moving to and from a bed to a chair (including a wheelchair)? 2  -ER 2  -KE    Standing up from a chair using your arms (e.g., wheelchair, bedside chair)? 2  -ER 2  -KE    Climbing 3-5 steps with a railing? 1  -ER 1  -KE    To walk in hospital room? 1  -ER 2  -KE    AM-PAC 6 Clicks Score (PT) 12  -ER 13  -KE    Highest Level of Mobility Goal 4 --> Transfer to chair/commode  -ER 4 --> Transfer to chair/commode  -KE      Row Name 12/07/23 1143 12/07/23 1021       Functional Assessment    Outcome Measure Options AM-PAC 6 Clicks Basic Mobility (PT)  -ER AM-PAC 6 Clicks Daily Activity (OT)  -SM              User Key  (r) = Recorded By, (t) = Taken By, (c) = Cosigned By      Initials Name Provider Type    Grecia Jacobs, OT Occupational Therapist    Oliva Lombardi, RN Registered Nurse    ER Cat Burris, EMMANUELLE Physical  Therapist                                 Physical Therapy Education       Title: PT OT SLP Therapies (In Progress)       Topic: Physical Therapy (Done)       Point: Mobility training (Done)       Learning Progress Summary             Patient Acceptance, E, VU by ER at 12/7/2023 1144                         Point: Home exercise program (Done)       Learning Progress Summary             Patient Acceptance, E, VU by ER at 12/7/2023 1144                         Point: Body mechanics (Done)       Learning Progress Summary             Patient Acceptance, E, VU by ER at 12/7/2023 1144                         Point: Precautions (Done)       Learning Progress Summary             Patient Acceptance, E, VU by ER at 12/7/2023 1144                                         User Key       Initials Effective Dates Name Provider Type Discipline    ER 10/15/23 -  Cat Burris, PT Physical Therapist PT                  PT Recommendation and Plan  Planned Therapy Interventions (PT): balance training, bed mobility training, gait training, home exercise program, patient/family education, strengthening, ROM (range of motion), transfer training, wheelchair management/propulsion training  Plan of Care Reviewed With: patient, family  Outcome Evaluation: Preston Wallis is a 58 year old male with PMH of PNA, sepsis, COPD exacerbation, DM, CHF, morbid obesity, HTN, A-fib, and abdominal wall abscess. Pt. on 2L o2 via NC at baseline. Pt. lives in a one level home with his spouse and adult children. He reports that he has had HH in the past and was able to walk short distances with a RW and w/c follow. Now, he is using a wheelchair and transferring independently without the use of an AD. He reports 2-3 falls within the past year while getting out of bed and may have hit head several times per pt. family. Pt. did not seek medical attention following falls. Today, pt. performs 3x STS from bedside chair, and was able to tolerate each stance for ~1 min.  Pt. has R shoulder pain so use of UE for support is difficult for pt. Pt. able to clear feet 2x with small range march with bilateral UE support as well. Pt. with some signs of SOB however SPo2 >90%. PT recommending short term SNF stay vs HH pending progress to improve overall ease of mobility.     Time Calculation:         PT Charges       Row Name 12/07/23 1144             Time Calculation    Start Time 1050  -ER      Stop Time 1115  -ER      Time Calculation (min) 25 min  -ER      PT Received On 12/07/23  -ER      PT - Next Appointment 12/08/23  -ER      PT Goal Re-Cert Due Date 12/21/23  -ER         Time Calculation- PT    Total Timed Code Minutes- PT 17 minute(s)  -ER         Timed Charges    47350 - PT Therapeutic Activity Minutes 17  -ER         Untimed Charges    PT Eval/Re-eval Minutes 8  -ER         Total Minutes    Timed Charges Total Minutes 17  -ER      Untimed Charges Total Minutes 8  -ER       Total Minutes 25  -ER                User Key  (r) = Recorded By, (t) = Taken By, (c) = Cosigned By      Initials Name Provider Type    ER Cat Burris, PT Physical Therapist                  Therapy Charges for Today       Code Description Service Date Service Provider Modifiers Qty    1471965 HC PT THERAPEUTIC ACT EA 15 MIN 12/7/2023 Cat Burris, PT GP 1    37177056000 HC PT EVAL MOD COMPLEXITY 3 12/7/2023 Cat Burris, PT GP 1            PT G-Codes  Outcome Measure Options: AM-PAC 6 Clicks Basic Mobility (PT)  AM-PAC 6 Clicks Score (PT): 12  AM-PAC 6 Clicks Score (OT): 14       Cat Burris PT  12/7/2023

## 2023-12-07 NOTE — PLAN OF CARE
Goal Outcome Evaluation:  Plan of Care Reviewed With: patient, family           Outcome Evaluation: Preston Wallis is a 58 year old male with PMH of PNA, sepsis, COPD exacerbation, DM, CHF, morbid obesity, HTN, A-fib, and abdominal wall abscess. Pt. on 2L o2 via NC at baseline. Pt. lives in a one level home with his spouse and adult children. He reports that he has had HH in the past and was able to walk short distances with a RW and w/c follow. Now, he is using a wheelchair and transferring independently without the use of an AD. He reports 2-3 falls within the past year while getting out of bed and may have hit head several times per pt. family. Pt. did not seek medical attention following falls. Today, pt. performs 3x STS from bedside chair, and was able to tolerate each stance for ~1 min. Pt. has R shoulder pain so use of UE for support is difficult for pt. Pt. able to clear feet 2x with small range march with bilateral UE support as well. Pt. with some signs of SOB however SPo2 >90%. PT recommending short term SNF stay vs HH pending progress to improve overall ease of mobility.

## 2023-12-07 NOTE — OUTREACH NOTE
Prep Survey      Flowsheet Row Responses   [facility] facility patient discharged from? Non-[facility]   Is LACE score < 7 ? Non-BH Discharge   Eligibility [facility]   H[facility] [place]   Date of Admission 11/28/23   Date of Discharge 12/06/23   Discharge Disposition Home or Self Care   Discharge diagnosis Sepsis   Does the patient have one of the following disease processes/diagnoses(primary or secondary)? Sepsis   Prep survey completed? Yes            Karen VIRAMONTES - Registered Nurse

## 2023-12-07 NOTE — PLAN OF CARE
Goal Outcome Evaluation:      A/O x4, weaned to baseline of 2L NC, NSR. No complaints of pain. IV antibx continue. 1 unit of blood transfused without complication. VSS.

## 2023-12-07 NOTE — THERAPY EVALUATION
Patient Name: Preston Wallis  : 1965    MRN: [id number]                              Today's Date: 2023       Admit Date: 2023    Visit Dx: No diagnosis found.  Patient Active Problem List   Diagnosis    Polyneuropathy    Paroxysmal atrial fibrillation    Obstructive sleep apnea    MRSA pneumonia    Low back pain    Chronic diastolic heart failure    Allergies    COPD exacerbation    Chronic anticoagulation    Benign prostatic hyperplasia    Impaired mobility and endurance    Stage 3a chronic kidney disease    Iron deficiency anemia secondary to inadequate dietary iron intake    Vitamin D deficiency    Class 3 severe obesity with serious comorbidity in adult    Lower extremity edema    Elevated alkaline phosphatase level    Venous insufficiency (chronic) (peripheral)    Tobacco abuse, in remission    History of Pseudomonas pneumonia    Chronic dyspnea    Gastroesophageal reflux disease    Bronchiectasis without complication    ERIC (acute kidney injury)    Altered mental status    Hyperlipidemia    Luetscher's syndrome    Neurogenic bladder    Class 1 obesity    Pneumonia due to Pseudomonas species    Seizures    Primary osteoarthritis of left knee    Other constipation    Chronic obstructive pulmonary disease    Type 2 DM with CKD stage 3 and hypertension    Essential hypertension    Stage 3b chronic kidney disease    Annual physical exam    Long-term use of high-risk medication    Personal history of PE (pulmonary embolism)    Encounter for aftercare for healing closed traumatic fracture of left femur    Chronic pain of left knee    Primary osteoarthritis of right knee    Sepsis    Bronchiectasis    Bacterial pneumonia    Anemia     Past Medical History:   Diagnosis Date    Age-related cognitive decline     Allergic contact dermatitis     Allergies     Anemia     Bronchiectasis with acute lower respiratory infection     Charcot foot due to diabetes mellitus 9/10/2013    Chronic diastolic  (congestive) heart failure     Chronic kidney disease     Chronic respiratory failure with hypoxia     Closed supracondylar fracture of femur 1/12/2022    COPD (chronic obstructive pulmonary disease)     Deep vein thrombosis (DVT) of lower extremity associated with air travel 1/13/2023    Dependence on supplemental oxygen     Eczema     Erectile dysfunction     due to organic reasons    Essential (primary) hypertension     Fracture     closed fracture of other tarsal and metatarsal bones    Fracture of proximal humerus 1/13/2023    GERD without esophagitis     High risk medication use     Hypercholesteremia     Hypomagnesemia     Infected stasis ulcer of left lower extremity 1/13/2023    Insomnia     Low back pain     Major depressive disorder     Morbid (severe) obesity due to excess calories     MRSA pneumonia     Muscle weakness     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 without evidence of necrosis     Obstructive sleep apnea (adult) (pediatric)     Other forms of dyspnea     Other long term (current) drug therapy     Other specified noninfective gastroenteritis and colitis     Other spondylosis, lumbar region     Pain in both knees     Paroxysmal atrial fibrillation     Peripheral neuropathy     attributed to type 2 diabetes    Pneumonia, unspecified organism     Polyneuropathy     Rash and other nonspecific skin eruption     Syncope and collapse     Tachycardia     Tinnitus 1/13/2023    Type 1 diabetes mellitus with diabetic chronic kidney disease     Type 2 diabetes mellitus     Unspecified fall, initial encounter     Urinary retention      Past Surgical History:   Procedure Laterality Date    CHOLECYSTECTOMY      CYSTOSCOPY      FEMUR SURGERY Left     Shravan placed    KNEE SURGERY Left     OTHER SURGICAL HISTORY Left     venous port    TONSILLECTOMY AND ADENOIDECTOMY        General Information       Row Name 12/07/23 1009          OT Time and Intention    Document  "Type evaluation  -     Mode of Treatment occupational therapy;individual therapy  -       Row Name 12/07/23 1009          General Information    Patient Profile Reviewed yes  -     Prior Level of Function --  assist with LBD and bathing at baseline, pt can typically transfer self into his w/c and toilet self. Pt reports \"I havent walked with the walker in a while\" when further questioned pt reports \"maybe weeks\"  -     Existing Precautions/Restrictions fall;oxygen therapy device and L/min  -     Barriers to Rehab medically complex  -       Row Name 12/07/23 1009          Occupational Profile    Environmental Supports and Barriers (Occupational Profile) Home DME includes BSC, w/c, rwx, shower chair, walk in shower remodled as pt reports he lost ability to get into the tub  -       Row Name 12/07/23 1009          Living Environment    People in Home spouse;child(cohn), adult  -       Row Name 12/07/23 1009          Home Main Entrance    Number of Stairs, Main Entrance none  -       Row Name 12/07/23 1009          Stairs Within Home, Primary    Stairs, Within Home, Primary pt reports home is w/c accessible  -       Row Name 12/07/23 1009          Cognition    Orientation Status (Cognition) oriented x 4  -       Row Name 12/07/23 1009          Safety Issues, Functional Mobility    Impairments Affecting Function (Mobility) balance;endurance/activity tolerance;strength;shortness of breath;range of motion (ROM)  -               User Key  (r) = Recorded By, (t) = Taken By, (c) = Cosigned By      Initials Name Provider Type     Grecia Shelby OT Occupational Therapist                     Mobility/ADL's       Row Name 12/07/23 1012          Bed Mobility    Supine-Sit [place] (Bed Mobility) not tested  -       Row Name 12/07/23 1012          Transfers    Transfers sit-stand transfer;toilet transfer  -     Comment, (Transfers) HHA for transfers pt reports he doesnt use AD with transfers at " baseline  -[facility] Name 12/07/23 1012          Sit-Stand Transfer    Sit-Stand [place] (Transfers) minimum assist (75% patient effort);verbal cues  -[facility] Name 12/07/23 1012          Toilet Transfer    Owen Level (Toilet Transfer) moderate assist (50% patient effort);verbal cues  -     Assistive Device (Toilet Transfer) commode, bedside without drop arms  -     Comment, (Toilet Transfer) BSC > chair, pt became very fatiqued and difficulty with take 2 side steps to center in the chair.  -[facility] Name 12/07/23 [facility][note 3]2          Activities of Daily Living    BADL Assessment/Intervention lower body dressing;toileting;bathing;upper body dressing;feeding  -SM       Row Name 12/07/23 1012          Lower Body Dressing Assessment/Training    Owen Level (Lower Body Dressing) don;socks;dependent (less than 25% patient effort)  -SM       Row Name 12/07/23 1012          Toileting Assessment/Training    Owen Level (Toileting) dependent (less than 25% patient effort)  -     Assistive Devices (Toileting) commode, bedside without drop arms  -     Comment, (Toileting) OT assisted with standing balance while nag aid assisted with hygiene.  -SM       Row Name 12/07/23 1012          Bathing Assessment/Intervention    Owen Level (Bathing) upper body;minimum assist (75% patient effort);lower body;maximum assist (25% patient effort)  -     Position (Bathing) supported sitting  -SM       Row Name 12/07/23 1012          Upper Body Dressing Assessment/Training    Owen Level (Upper Body Dressing) don;front opening garment;standby assist  -     Position (Upper Body Dressing) supported sitting  -SM       Row Name 12/07/23 1012          Self-Feeding Assessment/Training    Owen Level (Feeding) feeding skills;independent  -     Position (Self-Feeding) supported sitting  -               User Key  (r) = Recorded By, (t) = Taken By, (c) = Cosigned By      Initials Name  Provider Type     Grecia Shelby OT Occupational Therapist                   Obj/Interventions       [facility] Name 12/07/23 1014          Range of Motion Comprehensive    Comment, General Range of Motion R shoulder hx of fracture and arthritis, shoulder flex impaired 75%, otherwise distal RUE WFL, LUE WFL  -[facility] Name 12/07/23 1014          Strength Comprehensive (MMT)    Comment, General Manual Muscle Testing (MMT) Assessment generalized weakness MMT BUE 4-/5, except R shoulder 2-/5  -[facility] Name 12/07/23 1014          Motor Skills    Motor Skills functional endurance  -     Functional Endurance poor  -SM       Row Name 12/07/23 1014          Balance    Balance Assessment standing static balance;standing dynamic balance;sitting static balance  -     Static Sitting Balance supervision  -     Position, Sitting Balance --  BSC  -     Static Standing Balance contact guard  -     Dynamic Standing Balance minimal assist;moderate assist  -               User Key  (r) = Recorded By, (t) = Taken By, (c) = Cosigned By      Initials Name Provider Type     Grecia Shelby OT Occupational Therapist                   Goals/Plan       Row Name 12/07/23 1020          Transfer Goal 1 (OT)    Activity/Assistive Device (Transfer Goal 1, OT) toilet;shower chair  -     [place] Level/Cues Needed (Transfer Goal 1, OT) standby assist  -     Time Frame (Transfer Goal 1, OT) short term goal (STG);2 weeks  -SM     Progress/Outcome (Transfer Goal 1, OT) goal ongoing  -SM       Row Name 12/07/23 1020          Toileting Goal 1 (OT)    Activity/Device (Toileting Goal 1, OT) toileting skills, all  -     [place] Level/Cues Needed (Toileting Goal 1, OT) standby assist  -     Time Frame (Toileting Goal 1, OT) short term goal (STG);2 weeks  -SM     Progress/Outcome (Toileting Goal 1, OT) goal ongoing  -SM       Row Name 12/07/23 1020          Problem Specific Goal 1 (OT)    Problem Specific Goal 1 (OT)  Pt to increase endurance to fair to tolerate 15 min ADL with minimal rest breaks in w/c position vs standing  -     Time Frame (Problem Specific Goal 1, OT) short term goal (STG);2 weeks  -     Progress/Outcome (Problem Specific Goal 1, OT) goal ongoing  -       Row Name 12/07/23 1020          Therapy Assessment/Plan (OT)    Planned Therapy Interventions (OT) activity tolerance training;adaptive equipment training;functional balance retraining;occupation/activity based interventions;patient/caregiver education/training;transfer/mobility retraining;strengthening exercise;ROM/therapeutic exercise  -               User Key  (r) = Recorded By, (t) = Taken By, (c) = Cosigned By      Initials Name Provider Type     Grecia Shelby OT Occupational Therapist                   Clinical Impression       Row Name 12/07/23 1016          Pain Assessment    Pretreatment Pain Rating 0/10 - no pain  -     Posttreatment Pain Rating 0/10 - no pain  -       Row Name 12/07/23 1016          Plan of Care Review    Plan of Care Reviewed With patient  -     Outcome Evaluation Pt is a 58 y.o male transfered from [facility] with PNA, sepsis, COPD exacerbation. Pt with PMH of DM, CHF, morbid obesity, HTN, A fib, neurological vladder, abdominal wall abcess. Pt reports he is primarly a w/c user at baseline. He needs assist with some ADLs but can transfer and toilet self independently. Pt on 2L O2 at baseline and today on 4L. He has FREITAS with transfering from BSC to chair but SPO2 at 96% when checked. Pt reports feeling weaker than his baseline. He needs increased assist to stand and complete ADLs today. He would benefit from continued OT to address deficits and increase safety/independence with ADLs as well as endurance for self care routine. Anticipate dc home with HH vs SNF. Pt reports he has family support at home and all needed DME and his goal is home.  -       Row Name 12/07/23 1016          Therapy  Assessment/Plan (OT)    Rehab Potential (OT) good, to achieve stated therapy goals  -     Criteria for Skilled Therapeutic Interventions Met (OT) yes;skilled treatment is necessary  -     Therapy Frequency (OT) 3 times/wk  -       Row Name 12/07/23 1016          Therapy Plan Review/Discharge Plan (OT)    Anticipated Discharge Disposition (OT) skilled nursing facility;home with home health;home with assist  -       Row Name 12/07/23 1016          Vital Signs    Intra SpO2 (%) 96  -SM     O2 Delivery Intra Treatment supplemental O2  -       Row Name 12/07/23 1016          Positioning and Restraints    Pre-Treatment Position bedside commode  -SM     Post Treatment Position chair  -SM     In Chair reclined;call light within reach;encouraged to call for assist;exit alarm on;notified nsg  -               User Key  (r) = Recorded By, (t) = Taken By, (c) = Cosigned By      Initials Name Provider Type    SM Grecia Shelby, OT Occupational Therapist                   Outcome Measures       Row Name 12/07/23 1021          How much help from another is currently needed...    Putting on and taking off regular lower body clothing? 1  -SM     Bathing (including washing, rinsing, and drying) 2  -SM     Toileting (which includes using toilet bed pan or urinal) 1  -SM     Putting on and taking off regular upper body clothing 3  -SM     Taking care of personal grooming (such as brushing teeth) 3  -SM     Eating meals 4  -SM     AM-PAC 6 Clicks Score (OT) 14  -SM       Row Name 12/07/23 1143 12/07/23 0812       How much help from another person do you currently need...    Turning from your back to your side while in flat bed without using bedrails? 3  -ER 3  -KE    Moving from lying on back to sitting on the side of a flat bed without bedrails? 3  -ER 3  -KE    Moving to and from a bed to a chair (including a wheelchair)? 2  -ER 2  -KE    Standing up from a chair using your arms (e.g., wheelchair, bedside chair)? 2  -ER  2  -KE    Climbing 3-5 steps with a railing? 1  -ER 1  -KE    To walk in hospital room? 1  -ER 2  -KE    AM-PAC 6 Clicks Score (PT) 12  -ER 13  -KE    Highest Level of Mobility Goal 4 --> Transfer to chair/commode  -ER 4 --> Transfer to chair/commode  -KE      Row Name 12/07/23 1143 12/07/23 1021       Functional Assessment    Outcome Measure Options AM-PAC 6 Clicks Basic Mobility (PT)  -ER AM-PAC 6 Clicks Daily Activity (OT)  -              User Key  (r) = Recorded By, (t) = Taken By, (c) = Cosigned By      Initials Name Provider Type     Grecia Shelby, OT Occupational Therapist    Oliva Lombardi, RN Registered Nurse    ER Cat Burris, PT Physical Therapist                    Occupational Therapy Education       Title: PT OT SLP Therapies (In Progress)       Topic: Occupational Therapy (In Progress)       Point: ADL training (Done)       Description:   Instruct learner(s) on proper safety adaptation and remediation techniques during self care or transfers.   Instruct in proper use of assistive devices.                  Learning Progress Summary             Patient Acceptance, E, VU by  at 12/7/2023 1210    Comment: OT goals, dc planning, DME for ADLs                         Point: Home exercise program (Not Started)       Description:   Instruct learner(s) on appropriate technique for monitoring, assisting and/or progressing therapeutic exercises/activities.                  Learner Progress:  Not documented in this visit.              Point: Precautions (Not Started)       Description:   Instruct learner(s) on prescribed precautions during self-care and functional transfers.                  Learner Progress:  Not documented in this visit.              Point: Body mechanics (Not Started)       Description:   Instruct learner(s) on proper positioning and spine alignment during self-care, functional mobility activities and/or exercises.                  Learner Progress:  Not documented in this  visit.                              User Key       Initials Effective Dates Name Provider Type Discipline     04/02/20 -  Grecia Shelby, SAMSON Occupational Therapist OT                  OT Recommendation and Plan  Planned Therapy Interventions (OT): activity tolerance training, adaptive equipment training, functional balance retraining, occupation/activity based interventions, patient/caregiver education/training, transfer/mobility retraining, strengthening exercise, ROM/therapeutic exercise  Therapy Frequency (OT): 3 times/wk  Plan of Care Review  Plan of Care Reviewed With: patient  Outcome Evaluation: Pt is a 58 y.o male transfered from [facility] with PNA, sepsis, COPD exacerbation. Pt with PMH of DM, CHF, morbid obesity, HTN, A fib, neurological vladder, abdominal wall abcess. Pt reports he is primarly a w/c user at baseline. He needs assist with some ADLs but can transfer and toilet self independently. Pt on 2L O2 at baseline and today on 4L. He has FREITAS with transfering from BSC to chair but SPO2 at 96% when checked. Pt reports feeling weaker than his baseline. He needs increased assist to stand and complete ADLs today. He would benefit from continued OT to address deficits and increase safety/independence with ADLs as well as endurance for self care routine. Anticipate dc home with HH vs SNF. Pt reports he has family support at home and all needed DME and his goal is home.     Time Calculation:   Evaluation Complexity (OT)  Review Occupational Profile/Medical/Therapy History Complexity: expanded/moderate complexity  Assessment, Occupational Performance/Identification of Deficit Complexity: 3-5 performance deficits  Clinical Decision Making Complexity (OT): detailed assessment/moderate complexity  Overall Complexity of Evaluation (OT): moderate complexity     Time Calculation- OT       Row Name 12/07/23 1212             Time Calculation- OT    OT Start Time 0830  -      OT Stop Time 0851  -      OT  Time Calculation (min) 21 min  -SM      Total Timed Code Minutes- OT 15 minute(s)  -SM      OT Received On 12/07/23  -      OT - Next Appointment 12/08/23  -SM      OT Goal Re-Cert Due Date 12/21/23  -SM         Timed Charges    23139 - OT Self Care/Mgmt Minutes 15  -SM         Untimed Charges    OT Eval/Re-eval Minutes 6  -SM         Total Minutes    Timed Charges Total Minutes 15  -SM      Untimed Charges Total Minutes 6  -SM       Total Minutes 21  -SM                User Key  (r) = Recorded By, (t) = Taken By, (c) = Cosigned By      Initials Name Provider Type     Grecia Shelby OT Occupational Therapist                  Therapy Charges for Today       Code Description Service Date Service Provider Modifiers Qty    82324312500 HC OT SELF CARE/MGMT/TRAIN EA 15 MIN 12/7/2023 Grecia Shelby OT GO 1    44535006939 HC OT EVAL MOD COMPLEXITY 3 12/7/2023 Grecia Shelby OT GO 1                 Grecia Shelby OT  12/7/2023

## 2023-12-07 NOTE — PLAN OF CARE
Goal Outcome Evaluation:  Plan of Care Reviewed With: patient           Outcome Evaluation: Pt is a 58 y.o male transfered from [facility] with PNA, sepsis, COPD exacerbation. Pt with PMH of DM, CHF, morbid obesity, HTN, A fib, neurological vladder, abdominal wall abcess. Pt reports he is primarly a w/c user at baseline. He needs assist with some ADLs but can transfer and toilet self independently. Pt on 2L O2 at baseline and today on 4L. He has FREITAS with transfering from BSC to chair but SPO2 at 96% when checked. Pt reports feeling weaker than his baseline. He needs increased assist to stand and complete ADLs today. He would benefit from continued OT to address deficits and increase safety/independence with ADLs as well as endurance for self care routine. Anticipate dc home with HH vs SNF. Pt reports he has family support at home and all needed DME and his goal is home.      Anticipated Discharge Disposition (OT): skilled nursing facility, home with home health, home with assist

## 2023-12-07 NOTE — CONSULTS
Referring Provider: Provider Not In System  Reason for Consultation:     +sputum culture showing achromobacter xylosoxidans         Subjective   History of present illness: Patient is a 58-year-old male with past medical history of bronchiectasis with recurrent pneumonia, diabetes, CHF, CKD, COPD and morbid obesity who presents as transfer from [facility] due to pneumonia.  ID consulted for positive sputum culture showing Achromobacter xylosoxidans.    Patient has chronic baseline oxygen requirement of 2 L and was reportedly up to 4 L at outside [facility] with abnormal chest x-ray and abnormal CT scan showing right lower lobe pneumonia.  Sputum culture grew Achromobacter and he was originally placed on Bactrim plus Zosyn but later transition to Zyvox.  Also had small I&D of a abscess on the abdominal wall that has been culture negative.    Today patient reports she is feeling much better.  Currently on 4 L nasal cannula.  States his cough has improved significantly.  Reported a significant amount of sputum production on admission however this is now minimal.  Denies any fevers or chills.  Denies any nausea, vomiting, diarrhea.  Denies any abdominal pain.    Patient remains afebrile with WBC count of 24.  Procalcitonin improved from 3 to 1.3.  Lactate is normal.  Continues to have worsening renal dysfunction with creatinine up to 3.    Past Medical History:   Diagnosis Date    Age-related cognitive decline     Allergic contact dermatitis     Allergies     Anemia     Bronchiectasis with acute lower respiratory infection     Charcot foot due to diabetes mellitus 9/10/2013    Chronic diastolic (congestive) heart failure     Chronic kidney disease     Chronic respiratory failure with hypoxia     Closed supracondylar fracture of femur 1/12/2022    COPD (chronic obstructive pulmonary disease)     Deep vein thrombosis (DVT) of lower extremity associated with air travel 1/13/2023    Dependence on supplemental  oxygen     Eczema     Erectile dysfunction     due to organic reasons    Essential (primary) hypertension     Fracture     closed fracture of other tarsal and metatarsal bones    Fracture of proximal humerus 1/13/2023    GERD without esophagitis     High risk medication use     Hypercholesteremia     Hypomagnesemia     Infected stasis ulcer of left lower extremity 1/13/2023    Insomnia     Low back pain     Major depressive disorder     Morbid (severe) obesity due to excess calories     MRSA pneumonia     Muscle weakness     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 without evidence of necrosis     Obstructive sleep apnea (adult) (pediatric)     Other forms of dyspnea     Other long term (current) drug therapy     Other specified noninfective gastroenteritis and colitis     Other spondylosis, lumbar region     Pain in both knees     Paroxysmal atrial fibrillation     Peripheral neuropathy     attributed to type 2 diabetes    Pneumonia, unspecified organism     Polyneuropathy     Rash and other nonspecific skin eruption     Syncope and collapse     Tachycardia     Tinnitus 1/13/2023    Type 1 diabetes mellitus with diabetic chronic kidney disease     Type 2 diabetes mellitus     Unspecified fall, initial encounter     Urinary retention        Past Surgical History:   Procedure Laterality Date    CHOLECYSTECTOMY      CYSTOSCOPY      FEMUR SURGERY Left     Shravan placed    KNEE SURGERY Left     OTHER SURGICAL HISTORY Left     venous port    TONSILLECTOMY AND ADENOIDECTOMY         family history includes Asthma in his father; Cancer in his sister; Coronary artery disease in his mother; Diabetes type II in his mother and sister; Hypertension in his mother.     reports that he quit smoking about 30 years ago. His smoking use included cigarettes. He started smoking about 42 years ago. He has a 12.00 pack-year smoking history. He has never used smokeless tobacco. He reports that he does  not currently use alcohol. He reports that he does not use drugs.     Allergies   Allergen Reactions    Benadryl [Diphenhydramine] Itching    Proventil [Albuterol] Other (See Comments)     Mouth sores         Medication:  Antibiotics:  Anti-Infectives (From admission, onward)      Ordered     Dose/Rate Route Frequency Start Stop    12/06/23 2219  Linezolid (ZYVOX) 600 mg 300 mL        Ordering Provider: Alan Alejandra APRN    600 mg  300 mL/hr over 60 Minutes Intravenous Every 12 Hours 12/06/23 2315 12/13/23 2314              Objective     Physical Exam:   Vital Signs   Temp:  [97.9 °F (36.6 °C)-98.2 °F (36.8 °C)] 97.9 °F (36.6 °C)  Heart Rate:  [] 97  Resp:  [18] 18  BP: (134-149)/(58-67) 149/64    GENERAL: Awake and alert, in no acute distress.   HEENT: Oropharynx is clear. Hearing is grossly normal.   EYES: PERRL. No conjunctival injection. No lid lag.   LUNGS: Normal work of breathing on 4 L nasal cannula  GI: Soft, nontender, nondistended.   SKIN: I&D site around the bellybutton with no surrounding erythema.  PSYCHIATRIC: Appropriate mood, affect, insight, and judgment.     Results Review:   I reviewed the patient's new clinical results.  I reviewed the patient's new imaging results and agree with the interpretation.  I reviewed the patient's other test results and agree with the interpretation    Lab Results   Component Value Date    WBC 24.82 (H) 12/06/2023    HGB 7.7 (L) 12/06/2023    HCT 23.3 (L) 12/06/2023    MCV 86.3 12/06/2023     12/06/2023       Lab Results   Component Value Date    VANCOTROUGH 26.3 (C) 12/01/2023    VANCORANDOM 12.45 04/07/2022       Lab Results   Component Value Date    GLUCOSE 151 (H) 12/06/2023    BUN 48 (H) 12/06/2023    CREATININE 3.09 (H) 12/06/2023    EGFRIFNONA 46 (L) 07/27/2021    BCR 15.5 12/06/2023    CO2 24.2 12/06/2023    CALCIUM 8.5 (L) 12/06/2023    PROTENTOTREF 7.1 08/22/2022    ALBUMIN 2.8 (L) 12/06/2023    LABIL2 0.8 08/22/2022    AST 17 12/06/2023     ALT 22 12/06/2023         Estimated Creatinine Clearance: 34.2 mL/min (A) (by C-G formula based on SCr of 3.09 mg/dL (H)).      Microbiology:  Outside studies:  - Sputum culture Achromobacter xylosoxidans  -MRSA nares positive  -Abdominal wound culture no growth    Radiology:  Outside CT chest report reviewed with right lower lobe pneumonia    Assessment     #Right lower lobe pneumonia  #ERIC on CKD  #Acute hypoxic respiratory failure  #Bronchiectasis with acute exacerbation  #Morbid obesity, BMI 41  #Type 2 diabetes    Culture isolating actinobacter xylosoxidans at outside hospital and recommend stopping Zyvox with transition to Zosyn 4.5 g every 8 hours.  Obtain new sputum culture, strep pneumo urine antigen, MRSA nares and Legionella urine antigen.  Agree with pulmonary consult.      Thank you for this consult.  We will continue to follow along and tailor antibiotics as the patient's clinical course evolves.

## 2023-12-07 NOTE — CONSULTS
[facility] Consult Note      Patient Name: Preston Wallis  : 1965  MRN: [id number]  Primary Care Physician:  Kimmy Riley MD  Referring Physician: Provider Not In System  Date of admission: 2023    Subjective     Reason for Consult: Acute kidney injury on chronic kidney disease    HPI:   Preston Wallis is a 58 y.o. male was transferred from [facility] on 2023.  He has bronchiectasis and recurrent pneumonia, diabetes mellitus type 2 with renal complication, history of congestive heart failure, chronic kidney disease followed by my partner Dr. Martínez in our office in [place].  His chronic kidney disease associated with diabetes mellitus diabetic nephropathy baseline creatinine 1.6-1.8, he has diabetic neuropathy but denies retinopathy, he has hypertension and is morbidly obese.  History of neurogenic bladder doing self-catheterization at home.    Patient has chronic shortness of breath he denies orthopnea or PND, no chest pain, no nausea or vomiting, has Doyle catheter anchored in place and he has been doing self-catheterization at home    Review of Systems:   14 point review of systems is otherwise negative except for mentioned above on HPI    Personal History     Past Medical History:   Diagnosis Date    Age-related cognitive decline     Allergic contact dermatitis     Allergies     Anemia     Bronchiectasis with acute lower respiratory infection     Charcot foot due to diabetes mellitus 9/10/2013    Chronic diastolic (congestive) heart failure     Chronic kidney disease     Chronic respiratory failure with hypoxia     Closed supracondylar fracture of femur 2022    COPD (chronic obstructive pulmonary disease)     Deep vein thrombosis (DVT) of lower extremity associated with air travel 2023    Dependence on supplemental oxygen     Eczema     Erectile dysfunction     due to organic reasons    Essential (primary) hypertension     Fracture      closed fracture of other tarsal and metatarsal bones    Fracture of proximal humerus 1/13/2023    GERD without esophagitis     High risk medication use     Hypercholesteremia     Hypomagnesemia     Infected stasis ulcer of left lower extremity 1/13/2023    Insomnia     Low back pain     Major depressive disorder     Morbid (severe) obesity due to excess calories     MRSA pneumonia     Muscle weakness     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 without evidence of necrosis     Obstructive sleep apnea (adult) (pediatric)     Other forms of dyspnea     Other long term (current) drug therapy     Other specified noninfective gastroenteritis and colitis     Other spondylosis, lumbar region     Pain in both knees     Paroxysmal atrial fibrillation     Peripheral neuropathy     attributed to type 2 diabetes    Pneumonia, unspecified organism     Polyneuropathy     Rash and other nonspecific skin eruption     Syncope and collapse     Tachycardia     Tinnitus 1/13/2023    Type 1 diabetes mellitus with diabetic chronic kidney disease     Type 2 diabetes mellitus     Unspecified fall, initial encounter     Urinary retention        Past Surgical History:   Procedure Laterality Date    CHOLECYSTECTOMY      CYSTOSCOPY      FEMUR SURGERY Left     Shravan placed    KNEE SURGERY Left     OTHER SURGICAL HISTORY Left     venous port    TONSILLECTOMY AND ADENOIDECTOMY         Family History: family history includes Asthma in his father; Cancer in his sister; Coronary artery disease in his mother; Diabetes type II in his mother and sister; Hypertension in his mother.    Social History:  reports that he quit smoking about 30 years ago. His smoking use included cigarettes. He started smoking about 42 years ago. He has a 12.00 pack-year smoking history. He has never used smokeless tobacco. He reports that he does not currently use alcohol. He reports that he does not use drugs.    Home  Medications:  Prior to Admission medications    Medication Sig Start Date End Date Taking? Authorizing Provider   atorvastatin (LIPITOR) 20 MG tablet Take 1 tablet by mouth Every Night. 10/26/23  Yes Kimmy Riley MD   docusate sodium (COLACE) 100 MG capsule Take 1 capsule by mouth Daily. 10/26/23  Yes Kimmy Riley MD   DULoxetine (CYMBALTA) 60 MG capsule Take 1 capsule by mouth 2 (Two) Times a Day. 10/26/23  Yes Kimmy Riley MD   finasteride (PROSCAR) 5 MG tablet Take 1 tablet by mouth Daily. 10/26/23  Yes Kimmy Riley MD   folic acid (FOLVITE) 1 MG tablet Take 1 tablet by mouth Daily. 10/26/23  Yes Kimmy Riley MD   gabapentin (NEURONTIN) 300 MG capsule TAKE ONE CAPSULE BY MOUTH EVERY MORNING AND TAKE TWO CAPSULES BY MOUTH EVERY NIGHT AT BEDTIME 10/26/23  Yes Kimmy Riley MD   metoprolol tartrate (LOPRESSOR) 100 MG tablet Take 1 tablet by mouth 2 (Two) Times a Day. 10/26/23  Yes Kimmy Riley MD   montelukast (SINGULAIR) 10 MG tablet Take 1 tablet by mouth every night at bedtime. 10/26/23  Yes Kimmy Riley MD   apixaban (Eliquis) 5 MG tablet tablet Take 1 tablet by mouth Every 12 (Twelve) Hours.  Patient taking differently: Take 1 tablet by mouth Every 12 (Twelve) Hours. On hold by MD at [facility] 10/26/23   Kimmy Riley MD   B-D UF III MINI PEN NEEDLES 31G X 5 MM misc USE FOUR TIMES DAILY BEFORE MEALS AND AT BEDTIME 9/12/23   Neli Paredes APRN   Budeson-Glycopyrrol-Formoterol (Breztri Aerosphere) 160-9-4.8 MCG/ACT aerosol inhaler  3/20/23   Provider, MD Breann   bumetanide (BUMEX) 2 MG tablet Take 1 tablet BY MOUTH TWICE DAILY. call cardiology IF weight is greater THAN 2 pounds in 1 DAY OR 5 pounds in A WEEK.  Patient taking differently: Take 1 tablet BY MOUTH TWICE DAILY. call cardiology IF weight is greater THAN 2 pounds in 1 DAY OR 5 pounds in A WEEK.    To be discontinued by MD at [facility] 10/26/23   Kimmy Riley MD    calcium citrate (CALCITRATE) 950 (200 Ca) MG tablet Take 1 tablet by mouth Daily. 10/26/23   Kimmy Riley MD   Cholecalciferol (Vitamin D3) 50 MCG (2000 UT) tablet Take 1 tablet by mouth Daily. 10/26/23   Kimmy Riley MD   Continuous Blood Gluc  (FreeStyle [place] 2 [place]) device  1/17/23   Breann Shukla MD   Continuous Blood Gluc Sensor (FreeStyle [place] 2 Sensor) misc USE every 14 DAYS 11/8/23   Neli Paredes APRN   dapagliflozin (Farxiga) 5 MG tablet tablet Take 1 tablet by mouth Daily. 10/26/23   Kimmy Riley MD   exenatide er (Bydureon BCise) 2 MG/0.85ML auto-injector injection Inject 0.85 mL under the skin into the appropriate area as directed 1 (One) Time Per Week.    Breann Shukla MD   famotidine (PEPCID) 40 MG tablet Take 1 tablet by mouth Every Morning. 10/26/23   Kimmy Riley MD   ferrous gluconate (FERGON) 324 MG tablet Take 1 tablet by mouth Daily With Breakfast. 10/26/23   Kimmy Riley MD   Fluticasone-Salmeterol (ADVAIR/WIXELA) 500-50 MCG/ACT DISKUS Inhale 1 puff 2 (Two) Times a Day.    Breann Shukla MD   glucose blood test strip 1 each by Other route Daily. Dx:   E11.40 1/25/23   Kimmy Riley MD   Insulin Lispro, 1 Unit Dial, (HUMALOG) 100 UNIT/ML solution pen-injector inject EIGHT units UNDER THE SKIN INTO THE APPROPRIATE AREA THREE TIMES DAILY PER sliding scale, IF BLOOD SUGAR < 160= 0 units, 161-220= 2 units, 221-280= 4 units, 281-340 = SIX units, 341-400 = EIGHT units 11/9/23   Neli Paredes APRN   Lantus SoloStar 100 UNIT/ML injection pen INJECT 40 UNITS UNDER THE SKIN ONCE DAILY 9/11/23   Kimmy Riley MD   losartan (COZAAR) 25 MG tablet Take 1 tablet by mouth Every Morning. To be discontinued by MD at [facility] 1/23/23   Joseph Martínez MD   magnesium oxide (MAG-OX) 400 tablet tablet Take 1 tablet by mouth Daily. Started by Breann Pratt MD   NIFEdipine XL (PROCARDIA XL) 30 MG  24 hr tablet Take 1 tablet by mouth Every Morning.  Patient taking differently: Take 1 tablet by mouth Every Morning. To be discontinued by MD at [facility] 10/26/23   Kimmy Riley MD   O2 (OXYGEN) 2 Liter O2 - CONTINUOUS (route: Oxygen) 2/1/22   Provider, MD Breann   ondansetron ODT (ZOFRAN-ODT) 4 MG disintegrating tablet PLACE 1 TABLET ON THE TONGUE EVERY 8 HOURS AS NEEDED FOR NAUSEA AND VOMITING 11/22/23   Kimmy Riley MD   Semaglutide, 1 MG/DOSE, (Ozempic, 1 MG/DOSE,) 4 MG/3ML solution pen-injector Inject 1 mg under the skin into the appropriate area as directed 1 (One) Time Per Week. 11/10/23   Neli Paredes APRN   TRUEplus Lancets 28G misc USE AS DIRECTED 7/29/21   Kimmy Riley MD   Ventolin  (90 Base) MCG/ACT inhaler INHALE 2 PUFFS FOUR TIMES DAILY AS NEEDED FOR WHEEZING 7/13/23   Betzaida Nelson APRN       Allergies:  Allergies   Allergen Reactions    Benadryl [Diphenhydramine] Itching    Proventil [Albuterol] Other (See Comments)     Mouth sores         Objective     Vitals:   Temp:  [97.9 °F (36.6 °C)-98.2 °F (36.8 °C)] 97.9 °F (36.6 °C)  Heart Rate:  [] 101  Resp:  [18-20] 20  BP: (134-149)/(58-67) 149/64  Flow (L/min):  [3-4] 3    Intake/Output Summary (Last 24 hours) at 12/7/2023 0918  Last data filed at 12/7/2023 0854  Gross per 24 hour   Intake 240 ml   Output 2200 ml   Net -1960 ml       Physical Exam:   Constitutional: Awake, alert, no acute distress.  Chronically ill and morbidly  HEENT: Sclera anicteric, no conjunctival injection  Neck: Supple, no thyromegaly, no lymphadenopathy, trachea at midline, no JVD  Respiratory: Bilateral rhonchi, nonlabored respiration  Cardiovascular: RRR, no murmurs, no rubs or gallops, no carotid bruit  Gastrointestinal: Positive bowel sounds, abdomen is soft, nontender and nondistended  : Doyle catheter anchored  Musculoskeletal: 2+ edema, no clubbing or cyanosis  Psychiatric: Appropriate affect,  cooperative  Neurologic: Oriented x3, moving all extremities, normal speech and mental status  Skin: Warm and dry       Scheduled Meds:     acetaminophen, 650 mg, Oral, Once   Or  acetaminophen, 650 mg, Oral, Once   Or  acetaminophen, 650 mg, Rectal, Once  atorvastatin, 20 mg, Oral, Nightly  budesonide-formoterol, 2 puff, Inhalation, BID - RT  docusate sodium, 100 mg, Oral, Daily  famotidine, 40 mg, Oral, QAM  finasteride, 5 mg, Oral, Daily  gabapentin, 300 mg, Oral, Q8H  insulin glargine, 30 Units, Subcutaneous, Nightly  insulin lispro, 2-9 Units, Subcutaneous, 4x Daily AC & at Bedtime  ipratropium-albuterol, 3 mL, Nebulization, 4x Daily - RT  metoprolol tartrate, 100 mg, Oral, BID  montelukast, 10 mg, Oral, Daily  piperacillin-tazobactam, 4.5 g, Intravenous, Once  piperacillin-tazobactam, 4.5 g, Intravenous, Q8H  senna-docusate sodium, 2 tablet, Oral, BID      IV Meds:        Results Reviewed:   I have personally reviewed the results from the time of this admission to 12/7/2023 09:18 EST     Lab Results   Component Value Date    GLUCOSE 331 (H) 12/07/2023    CALCIUM 8.4 (L) 12/07/2023     (L) 12/07/2023    K 4.3 12/07/2023    CO2 21.7 (L) 12/07/2023     12/07/2023    BUN 46 (H) 12/07/2023    CREATININE 2.79 (H) 12/07/2023    EGFRIFNONA 46 (L) 07/27/2021    BCR 16.5 12/07/2023    ANIONGAP 12.3 12/07/2023      Lab Results   Component Value Date    MG 2.3 11/30/2023    PHOS 4.0 05/25/2023    ALBUMIN 2.8 (L) 12/06/2023           Assessment / Plan       Paroxysmal atrial fibrillation    Obstructive sleep apnea    Chronic diastolic heart failure    ERIC (acute kidney injury)    Hyperlipidemia    Neurogenic bladder    Chronic obstructive pulmonary disease    Essential hypertension    Stage 3b chronic kidney disease    Sepsis    Bronchiectasis    Bacterial pneumonia    Anemia      ASSESSMENT:  Acute kidney injury on chronic kidney disease, multifactorial in etiology, creatinine slightly better today than on  admission, creatinine today 2.79  Chronic kidney disease stage III secondary to diabetic nephropathy baseline creatinine 1.6-1.8  6 bronchiectasis pneumonia being evaluated by ID  Diabetes mellitus type 2 with renal complication  Diabetic neuropathy  Neurogenic bladder quiring self-catheterization currently has Doyle catheter anchored in  Severe anemia, hemoglobin down to 6.9  Morbid obesity  Hypertension with chronic kidney disease, reasonably controlled    PLAN:  Check random urine for sodium, chloride and protein to creatinine  I agree with the present treatment  Packed RBCs transfusion already been ordered by the primary team  Surveillance labs    I reviewed the chart and other providers notes, I reviewed labs  I discussed the case with the patient and he voiced good understanding.    Thank you for involving us in the care of Preston Wallis.  Please feel free to call with any questions.    Buddy Ignacio MD  12/07/23  09:18 [facility]    [facility]  [phone]      Please note that portions of this note were completed with a voice recognition program.

## 2023-12-07 NOTE — PLAN OF CARE
Problem: Adult Inpatient Plan of Care  Goal: Plan of Care Review  Outcome: Ongoing, Progressing  Flowsheets (Taken 12/7/2023 3907)  Progress: no change  Plan of Care Reviewed With: patient  Outcome Evaluation: Patient from [facility] with COPD exac, PNA. IV antibiotics. Doyle in place. Port flushing and blood return noted. 4L NC. A&Ox4. Pulm, Nephro, and ID consulted. VSS. All needs met.

## 2023-12-07 NOTE — H&P
Patient Name:  Preston Wallis  YOB: 1965  MRN:  [id number]  Admit Date:  12/6/2023  Patient Care Team:  Kimmy Riley MD as PCP - General (Family Medicine)  Ermelinda Wesley LPN as Licensed Practical Nurse  Lino Ware MD as Consulting Physician (Cardiology)  Severiano Carolina MD as Consulting Physician (Pulmonary Disease)  Joseph Martínez MD as Consulting Physician (Nephrology)  Tara Rosado RN as Ambulatory  (Population Health)  Teresita White MD as Consulting Physician (Urology)  Neli Paredes APRN as Nurse Practitioner (Endocrinology)      Subjective   History Present Illness     Chief Complaint: Shortness of breath    Mr. Wallis is a 58 y.o. former smoker with a history of COPD, chronic diastolic CHF, pseudomonas pneumonia, MRSA pneumonia, chronic respiratory failure with hypoxia, chronic bronchiectasis, type 2 diabetes mellitus, hypertension, hyperlipidemia, paroxysmal atrial fibrillation, and MILADY  that presents to [facility] complaining of shortness of breath.  He was transferred from [facility] following sputum cultures that grew achromobacter xylosoxidans.  He initially presented to [facility] on 11/28/2023 and a CT showed evidence of right lower lobe pneumonia.  A respiratory viral panel was negative and a MRSA swab was positive.  He was started on Vancomycin and Cefepime.  Once the sputum cultures resulted on 12/3/2023, he was started on Bactrim and Zosyn.  He showed evidence of worsening pneumonia after a few days and his oxygen requirements increased to 4L NC from his baseline of 2L NC. He also had persistent leukocytosis. The Bactrim was discontinued and he was started on Zyvox for MRSA pneumonia.  He was also thought to be having a bronchiectasis exacerbation.  His kidney function has also worsened and he was transferred to [facility] for evaluation by infectious disease and  nephrology.      History of Present Illness  Review of Systems   Constitutional:  Positive for fever. Negative for chills.   HENT:  Negative for congestion and sore throat.    Eyes:  Negative for photophobia and visual disturbance.   Respiratory:  Positive for wheezing. Negative for cough, chest tightness and shortness of breath.    Cardiovascular:  Negative for chest pain and leg swelling.   Gastrointestinal:  Negative for abdominal pain, constipation, diarrhea, nausea and vomiting.   Endocrine: Negative for polydipsia, polyphagia and polyuria.   Musculoskeletal:  Negative for arthralgias and myalgias.   Skin:  Negative for color change and wound.   Neurological:  Negative for dizziness, weakness, light-headedness, numbness and headaches.        Personal History     Past Medical History:   Diagnosis Date    Age-related cognitive decline     Allergic contact dermatitis     Allergies     Anemia     Bronchiectasis with acute lower respiratory infection     Charcot foot due to diabetes mellitus 9/10/2013    Chronic diastolic (congestive) heart failure     Chronic kidney disease     Chronic respiratory failure with hypoxia     Closed supracondylar fracture of femur 1/12/2022    COPD (chronic obstructive pulmonary disease)     Deep vein thrombosis (DVT) of lower extremity associated with air travel 1/13/2023    Dependence on supplemental oxygen     Eczema     Erectile dysfunction     due to organic reasons    Essential (primary) hypertension     Fracture     closed fracture of other tarsal and metatarsal bones    Fracture of proximal humerus 1/13/2023    GERD without esophagitis     High risk medication use     Hypercholesteremia     Hypomagnesemia     Infected stasis ulcer of left lower extremity 1/13/2023    Insomnia     Low back pain     Major depressive disorder     Morbid (severe) obesity due to excess calories     MRSA pneumonia     Muscle weakness     Non-pressure chronic ulcer of other part of unspecified foot  with bone involvement without evidence of necrosis     Obstructive sleep apnea (adult) (pediatric)     Other forms of dyspnea     Other long term (current) drug therapy     Other specified noninfective gastroenteritis and colitis     Other spondylosis, lumbar region     Pain in both knees     Paroxysmal atrial fibrillation     Peripheral neuropathy     attributed to type 2 diabetes    Pneumonia, unspecified organism     Polyneuropathy     Rash and other nonspecific skin eruption     Syncope and collapse     Tachycardia     Tinnitus 2023    Type 1 diabetes mellitus with diabetic chronic kidney disease     Type 2 diabetes mellitus     Unspecified fall, initial encounter     Urinary retention      Past Surgical History:   Procedure Laterality Date    CHOLECYSTECTOMY      CYSTOSCOPY      FEMUR SURGERY Left     Shravan placed    KNEE SURGERY Left     OTHER SURGICAL HISTORY Left     venous port    TONSILLECTOMY AND ADENOIDECTOMY       Family History   Problem Relation Age of Onset    Coronary artery disease Mother     Hypertension Mother     Diabetes type II Mother     Asthma Father     Diabetes type II Sister     Cancer Sister      Social History     Tobacco Use    Smoking status: Former     Packs/day: 1.00     Years: 12.00     Additional pack years: 0.00     Total pack years: 12.00     Types: Cigarettes     Start date:      Quit date:      Years since quittin.9    Smokeless tobacco: Never   Vaping Use    Vaping Use: Never used   Substance Use Topics    Alcohol use: Not Currently    Drug use: Never     Medications Prior to Admission   Medication Sig Dispense Refill Last Dose    atorvastatin (LIPITOR) 20 MG tablet Take 1 tablet by mouth Every Night. 90 tablet 2 2023    docusate sodium (COLACE) 100 MG capsule Take 1 capsule by mouth Daily. 90 capsule 2 2023    DULoxetine (CYMBALTA) 60 MG capsule Take 1 capsule by mouth 2 (Two) Times a Day. 180 capsule 2 2023    finasteride (PROSCAR) 5 MG tablet  Take 1 tablet by mouth Daily. 90 tablet 2 12/5/2023    folic acid (FOLVITE) 1 MG tablet Take 1 tablet by mouth Daily. 90 tablet 2 12/6/2023    gabapentin (NEURONTIN) 300 MG capsule TAKE ONE CAPSULE BY MOUTH EVERY MORNING AND TAKE TWO CAPSULES BY MOUTH EVERY NIGHT AT BEDTIME 270 capsule 2 12/6/2023    metoprolol tartrate (LOPRESSOR) 100 MG tablet Take 1 tablet by mouth 2 (Two) Times a Day. 189 tablet 2 12/6/2023    montelukast (SINGULAIR) 10 MG tablet Take 1 tablet by mouth every night at bedtime. 90 tablet 2 12/6/2023    apixaban (Eliquis) 5 MG tablet tablet Take 1 tablet by mouth Every 12 (Twelve) Hours. (Patient taking differently: Take 1 tablet by mouth Every 12 (Twelve) Hours. On hold by MD at [facility]) 180 tablet 2 Unknown    B-D UF III MINI PEN NEEDLES 31G X 5 MM misc USE FOUR TIMES DAILY BEFORE MEALS AND AT BEDTIME 360 each 1 Unknown    Budeson-Glycopyrrol-Formoterol (Breztri Aerosphere) 160-9-4.8 MCG/ACT aerosol inhaler    Unknown    bumetanide (BUMEX) 2 MG tablet Take 1 tablet BY MOUTH TWICE DAILY. call cardiology IF weight is greater THAN 2 pounds in 1 DAY OR 5 pounds in A WEEK. (Patient taking differently: Take 1 tablet BY MOUTH TWICE DAILY. call cardiology IF weight is greater THAN 2 pounds in 1 DAY OR 5 pounds in A WEEK.    To be discontinued by MD at [facility]) 180 tablet 2 Unknown    calcium citrate (CALCITRATE) 950 (200 Ca) MG tablet Take 1 tablet by mouth Daily. 90 tablet 2 Unknown    Cholecalciferol (Vitamin D3) 50 MCG (2000 UT) tablet Take 1 tablet by mouth Daily. 90 tablet 2 Unknown    Continuous Blood Gluc  (FreeStyle [place] 2 [place]) device    Unknown    Continuous Blood Gluc Sensor (FreeStyle [place] 2 Sensor) misc USE every 14 DAYS 2 each 11 Unknown    dapagliflozin (Farxiga) 5 MG tablet tablet Take 1 tablet by mouth Daily. 90 tablet 2 Unknown    exenatide er (Bydureon BCise) 2 MG/0.85ML auto-injector injection Inject 0.85 mL under the skin into the appropriate area as directed 1 (One) Time  Per Week.   Unknown    famotidine (PEPCID) 40 MG tablet Take 1 tablet by mouth Every Morning. 90 tablet 2 Unknown    ferrous gluconate (FERGON) 324 MG tablet Take 1 tablet by mouth Daily With Breakfast. 90 tablet 2 Unknown    Fluticasone-Salmeterol (ADVAIR/WIXELA) 500-50 MCG/ACT DISKUS Inhale 1 puff 2 (Two) Times a Day.   Unknown    glucose blood test strip 1 each by Other route Daily. Dx:   E11.40 100 each 4 Unknown    Insulin Lispro, 1 Unit Dial, (HUMALOG) 100 UNIT/ML solution pen-injector inject EIGHT units UNDER THE SKIN INTO THE APPROPRIATE AREA THREE TIMES DAILY PER sliding scale, IF BLOOD SUGAR < 160= 0 units, 161-220= 2 units, 221-280= 4 units, 281-340 = SIX units, 341-400 = EIGHT units 15 mL 1 Unknown    Lantus SoloStar 100 UNIT/ML injection pen INJECT 40 UNITS UNDER THE SKIN ONCE DAILY 15 mL 1 Unknown    losartan (COZAAR) 25 MG tablet Take 1 tablet by mouth Every Morning. To be discontinued by MD at [facility]   Unknown    magnesium oxide (MAG-OX) 400 tablet tablet Take 1 tablet by mouth Daily. Started by [facility]   Unknown    NIFEdipine XL (PROCARDIA XL) 30 MG 24 hr tablet Take 1 tablet by mouth Every Morning. (Patient taking differently: Take 1 tablet by mouth Every Morning. To be discontinued by MD at [facility]) 90 tablet 2 Unknown    O2 (OXYGEN) 2 Liter O2 - CONTINUOUS (route: Oxygen)       ondansetron ODT (ZOFRAN-ODT) 4 MG disintegrating tablet PLACE 1 TABLET ON THE TONGUE EVERY 8 HOURS AS NEEDED FOR NAUSEA AND VOMITING 10 tablet 0 Unknown    Semaglutide, 1 MG/DOSE, (Ozempic, 1 MG/DOSE,) 4 MG/3ML solution pen-injector Inject 1 mg under the skin into the appropriate area as directed 1 (One) Time Per Week. 3 mL 5 Unknown    TRUEplus Lancets 28G misc USE AS DIRECTED 100 each 0 Unknown    Ventolin  (90 Base) MCG/ACT inhaler INHALE 2 PUFFS FOUR TIMES DAILY AS NEEDED FOR WHEEZING 18 g 11      Allergies:    Allergies   Allergen Reactions    Benadryl [Diphenhydramine] Itching    Proventil [Albuterol] Other (See  Comments)     Mouth sores         Objective    Objective     Vital Signs  Temp:  [97.9 °F (36.6 °C)-98.2 °F (36.8 °C)] 98.2 °F (36.8 °C)  Heart Rate:  [100] 100  Resp:  [18] 18  BP: (134-136)/(58-67) 136/67  SpO2:  [96 %-99 %] 99 %  on  Flow (L/min):  [4] 4;   Device (Oxygen Therapy): nasal cannula  Body mass index is 41.02 kg/m².    Physical Exam  Vitals and nursing note reviewed.   Constitutional:       Appearance: He is obese.   HENT:      Head: Normocephalic and atraumatic.      Nose: Nose normal.      Mouth/Throat:      Mouth: Mucous membranes are moist.      Pharynx: Oropharynx is clear.   Eyes:      Extraocular Movements: Extraocular movements intact.      Conjunctiva/sclera: Conjunctivae normal.   Cardiovascular:      Rate and Rhythm: Normal rate and regular rhythm.      Pulses: Normal pulses.      Heart sounds: Normal heart sounds.   Pulmonary:      Effort: Pulmonary effort is normal.      Breath sounds: Examination of the right-middle field reveals rhonchi. Examination of the left-middle field reveals rhonchi. Examination of the right-lower field reveals wheezing and rhonchi. Examination of the left-lower field reveals wheezing and rhonchi.   Abdominal:      General: Bowel sounds are normal.      Palpations: Abdomen is soft.   Musculoskeletal:         General: Normal range of motion.      Cervical back: Normal range of motion and neck supple.      Right lower leg: No edema.      Left lower leg: No edema.   Skin:     General: Skin is warm and dry.   Neurological:      General: No focal deficit present.      Mental Status: He is alert and oriented to person, place, and time.   Psychiatric:         Mood and Affect: Mood normal.         Behavior: Behavior normal.         Results Review:  I reviewed the patient's new clinical results.  I reviewed the patient's new imaging results and agree with the interpretation.  I reviewed the patient's other test results and agree with the interpretation  I personally  viewed and interpreted the patient's EKG/Telemetry data  Discussed with ED provider.    Lab Results (last 24 hours)       Procedure Component Value Units Date/Time    Glucose, Nova Meter [[id number]]  (Abnormal) Collected: 12/06/23 0740     Updated: 12/06/23 1902    Glucose, Nova Meter [[id number]]  (Abnormal) Collected: 12/06/23 1136     Updated: 12/06/23 1902    Glucose, Nova Meter [[id number]]  (Abnormal) Collected: 12/06/23 1613     Updated: 12/06/23 1902    CBC & Differential [[id number]]  (Abnormal) Collected: 12/06/23 2132    Specimen: Blood Updated: 12/06/23 2151    Narrative:      The following orders were created for panel order CBC & Differential.  Procedure                               Abnormality         Status                     ---------                               -----------         ------                     CBC Auto Differential[[id number]]        Abnormal            Final result                 Please view results for these tests on the individual orders.    Comprehensive Metabolic Panel [[id number]]  (Abnormal) Collected: 12/06/23 2132    Specimen: Blood Updated: 12/06/23 2208     Glucose 151 mg/dL      BUN 48 mg/dL      Creatinine 3.09 mg/dL      Sodium 138 mmol/L      Potassium 4.0 mmol/L      Chloride 101 mmol/L      CO2 24.2 mmol/L      Calcium 8.5 mg/dL      Total Protein 6.6 g/dL      Albumin 2.8 g/dL      ALT (SGPT) 22 U/L      AST (SGOT) 17 U/L      Alkaline Phosphatase 124 U/L      Total Bilirubin 0.3 mg/dL      Globulin 3.8 gm/dL      A/G Ratio 0.7 g/dL      BUN/Creatinine Ratio 15.5     Anion Gap 12.8 mmol/L      eGFR 22.5 mL/min/1.73     Narrative:      GFR Normal >60  Chronic Kidney Disease <60  Kidney Failure <15      Protime-INR [[id number]]  (Abnormal) Collected: 12/06/23 2132    Specimen: Blood Updated: 12/06/23 2203     Protime 16.8 Seconds      INR 1.34    Procalcitonin [[id number]]  (Abnormal) Collected: 12/06/23 2132    Specimen: Blood Updated: 12/06/23 2215     Procalcitonin  "1.33 ng/mL     Narrative:      As a Marker for Sepsis (Non-Neonates):    1. <0.5 ng/mL represents a low risk of severe sepsis and/or septic shock.  2. >2 ng/mL represents a high risk of severe sepsis and/or septic shock.    As a Marker for Lower Respiratory Tract Infections that require antibiotic therapy:    PCT on Admission    Antibiotic Therapy       6-12 Hrs later    >0.5                Strongly Recommended  >0.25 - <0.5        Recommended   0.1 - 0.25          Discouraged              Remeasure/reassess PCT  <0.1                Strongly Discouraged     Remeasure/reassess PCT    As 28 day mortality risk marker: \"Change in Procalcitonin Result\" (>80% or <=80%) if Day 0 (or Day 1) and Day 4 values are available. Refer to http://www.[email or web address]ion[facility]ct-calculator.com    Change in PCT <=80%  A decrease of PCT levels below or equal to 80% defines a positive change in PCT test result representing a higher risk for 28-day all-cause mortality of patients diagnosed with severe sepsis for septic shock.    Change in PCT >80%  A decrease of PCT levels of more than 80% defines a negative change in PCT result representing a lower risk for 28-day all-cause mortality of patients diagnosed with severe sepsis or septic shock.       Lactic Acid, Plasma [[id number]]  (Normal) Collected: 12/06/23 2132    Specimen: Blood Updated: 12/06/23 2232     Lactate 1.3 mmol/L     CBC Auto Differential [[id number]]  (Abnormal) Collected: 12/06/23 2132    Specimen: Blood Updated: 12/06/23 2151     WBC 24.82 10*3/mm3      RBC 2.70 10*6/mm3      Hemoglobin 7.7 g/dL      Hematocrit 23.3 %      MCV 86.3 fL      MCH 28.5 pg      MCHC 33.0 g/dL      RDW 13.0 %      RDW-SD 39.9 fl      MPV 9.0 fL      Platelets 435 10*3/mm3      Neutrophil % 79.2 %      Lymphocyte % 10.5 %      Monocyte % 6.4 %      Eosinophil % 1.7 %      Basophil % 0.1 %      Immature Grans % 2.1 %      Neutrophils, Absolute 19.67 10*3/mm3      Lymphocytes, Absolute 2.61 10*3/mm3      " Monocytes, Absolute 1.58 10*3/mm3      Eosinophils, Absolute 0.42 10*3/mm3      Basophils, Absolute 0.03 10*3/mm3      Immature Grans, Absolute 0.51 10*3/mm3      nRBC 0.2 /100 WBC     POC Glucose Once [[id number]]  (Abnormal) Collected: 12/06/23 2145    Specimen: Blood Updated: 12/06/23 2146     Glucose 192 mg/dL     Creatinine Urine Random (kidney function) GFR component - Indwelling Urethral Catheter [[id number]] Collected: 12/07/23 0148    Specimen: Urine from Indwelling Urethral Catheter Updated: 12/07/23 0539     Creatinine, Urine 35.1 mg/dL     Narrative:      Reference intervals for random urine have not been established.  Clinical usage is dependent upon physician's interpretation in combination with other laboratory tests.       Sodium, Urine, Random - Indwelling Urethral Catheter [[id number]] Collected: 12/07/23 0148    Specimen: Urine from Indwelling Urethral Catheter Updated: 12/07/23 0542     Sodium, Urine 47 mmol/L     Narrative:      Reference intervals for random urine have not been established.  Clinical usage is dependent upon physician's interpretation in combination with other laboratory tests.       POC Glucose Once [[id number]]  (Abnormal) Collected: 12/07/23 0646    Specimen: Blood Updated: 12/07/23 0648     Glucose 381 mg/dL             Imaging Results (Last 24 Hours)       Procedure Component Value Units Date/Time    XR Chest 1 View [[id number]] Collected: 12/07/23 0024     Updated: 12/07/23 0032    Narrative:      XR CHEST 1 VW-     INDICATION: Port placement     COMPARISON: 5/18/2023       Impression:      Unchanged right subclavian port with tip overlying the mid  SVC. Overlapping central venous tubing extending from the left  subclavian vein to the mid SVC, possibly abandoned. Patient is rotated.  Unchanged at least partial right upper lobe atelectasis and bilateral,  right greater than left parenchymal opacities. No pleural effusion or  pneumothorax.     This report was finalized on  12/7/2023 12:29 AM by Dr. Pawan Mcallister M.D on Workstation: [id number]               Results for orders placed during the hospital encounter of 04/05/22    Adult Transthoracic Echo Limited W/ Cont if Necessary Per Protocol    Interpretation Summary  · The aortic root measures 3.1 cm.  · Left ventricular ejection fraction appears to be 56 - 60%.  · Left ventricular diastolic function is consistent with (grade I) impaired relaxation.    There were no apparent intracardiac masses, vegetations or thrombi.      SCANNED - TELEMETRY     Final Result      SCANNED - TELEMETRY     Final Result      SCANNED - TELEMETRY     Final Result           Assessment/Plan     Active Hospital Problems    Diagnosis  POA    **Pneumonia [J18.9]  Unknown    Bronchiectasis [J47.9]  Unknown    Sepsis [A41.9]  Unknown    Essential hypertension [I10]  Yes     not at goal, to check BP at home and call if BP > 140/90      Chronic obstructive pulmonary disease [J44.9]  Yes     stable, cont current pulm meds, f/u Dr Chavez as directed      Stage 3b chronic kidney disease [N18.32]  Yes     chronic, f/u Dr Martínez as directed      Neurogenic bladder [N31.9]  Yes    Hyperlipidemia [E78.5]  Yes    Paroxysmal atrial fibrillation [I48.0]  Yes    Obstructive sleep apnea [G47.33]  Yes    Chronic diastolic heart failure [I50.32]  Yes    ERIC (acute kidney injury) [N17.9]  Yes       Sepsis secondary to Pneumonia  -His WBC is 24.82 and procal is 1.33  -MRSA nares screen was positive  -Sputum cultures grew out achromobacter xylosoxidans on 12/3/2023  -He was initially treated with Vancomycin and Cefepime. Once cultures resulted, he was started on Bactrim and Zosyn  -X-ray showed worsening pneumonia  -Continue Zyvox for now  -Infectious disease consult  -IS    ERIC on CKD Stage 3b  -His baseline creatinine appears to be around 1.8  -His creatinine when he [facility] to [facility] facility was 2. His diuretics and ARBs were initially placed on hold but then he  developed worsening respiratory symptoms and required BiPAP. He was started on Bumex 2 mg IV and his creatinine is currently 3.09  -Nephrology consult  -Repeat labs in AM    COPD/Chronic Cystic Bronchiectasis with Acute Exacerbation  -He is currently requiring supplemental oxygen at 4L NC. He wears 2L NC at home. Continue oxygen to keep sats greater than or equal to 90%  -Scheduled and PRN albuterol  -Pulmonology consult    Abdominal Wall Cellulitis  -He is s/p I&D of a small abscess of his pannus by general surgery  -He had some bleeding post procedure and his Eliquis was stopped  -Cellulitis has resolved  -WOCN consult    Hypertension  -Blood pressures stable. Continue Metoprolol  -Monitor    Paroxysmal Atrial Fibrillation  -Rate controlled  -Check EKG in AM    Chronic Diastolic CHF  -He was on Bumex at the outlying facility  -Hold off on diuretics for now due to his kidney function  -He may require a cardiology consult    Type 2 Diabetes Mellitus  -Hold oral diabetic medications  -Initiate correctional factor insulin  -Continue basal insulin at an attenuated dose  -Hgb A1C 8.7 on 11/10/23    Neurogenic Bladder  -An indwelling F/C was placed at [facility]  -Continue F/C care    Morbid Obesity  -Complicating all of the above    Bedbug Infestation      -I discussed the patients findings and my recommendations with patient.    VTE Prophylaxis - SCDs.  Code Status - Full code.       CON Carnes  [facility]  12/07/23  01:15 EST

## 2023-12-08 ENCOUNTER — APPOINTMENT (OUTPATIENT)
Dept: GENERAL RADIOLOGY | Facility: HOSPITAL | Age: 58
DRG: 853 | End: 2023-12-08
Payer: COMMERCIAL

## 2023-12-08 ENCOUNTER — TRANSITIONAL CARE MANAGEMENT TELEPHONE ENCOUNTER (OUTPATIENT)
Dept: CALL CENTER | Facility: HOSPITAL | Age: 58
End: 2023-12-08
Payer: COMMERCIAL

## 2023-12-08 LAB
ALBUMIN SERPL-MCNC: 3.1 G/DL (ref 3.5–5.2)
ALBUMIN/GLOB SERPL: 0.8 G/DL
ALP SERPL-CCNC: 130 U/L (ref 39–117)
ALT SERPL W P-5'-P-CCNC: 27 U/L (ref 1–41)
ANION GAP SERPL CALCULATED.3IONS-SCNC: 12 MMOL/L (ref 5–15)
AST SERPL-CCNC: 23 U/L (ref 1–40)
BACTERIA SPEC RESP CULT: NORMAL
BASOPHILS # BLD AUTO: 0.03 10*3/MM3 (ref 0–0.2)
BASOPHILS NFR BLD AUTO: 0.1 % (ref 0–1.5)
BH BB BLOOD EXPIRATION DATE: NORMAL
BH BB BLOOD TYPE BARCODE: 9500
BH BB DISPENSE STATUS: NORMAL
BH BB PRODUCT CODE: NORMAL
BH BB UNIT NUMBER: NORMAL
BILIRUB SERPL-MCNC: 0.2 MG/DL (ref 0–1.2)
BUN SERPL-MCNC: 35 MG/DL (ref 6–20)
BUN/CREAT SERPL: 12.9 (ref 7–25)
CALCIUM SPEC-SCNC: 9 MG/DL (ref 8.6–10.5)
CHLORIDE SERPL-SCNC: 102 MMOL/L (ref 98–107)
CO2 SERPL-SCNC: 27 MMOL/L (ref 22–29)
CREAT SERPL-MCNC: 2.71 MG/DL (ref 0.76–1.27)
CROSSMATCH INTERPRETATION: NORMAL
DEPRECATED RDW RBC AUTO: 41.2 FL (ref 37–54)
EGFRCR SERPLBLD CKD-EPI 2021: 26.4 ML/MIN/1.73
EOSINOPHIL # BLD AUTO: 0.65 10*3/MM3 (ref 0–0.4)
EOSINOPHIL NFR BLD AUTO: 3.1 % (ref 0.3–6.2)
ERYTHROCYTE [DISTWIDTH] IN BLOOD BY AUTOMATED COUNT: 13 % (ref 12.3–15.4)
GLOBULIN UR ELPH-MCNC: 3.7 GM/DL
GLUCOSE BLDC GLUCOMTR-MCNC: 149 MG/DL (ref 70–130)
GLUCOSE BLDC GLUCOMTR-MCNC: 228 MG/DL (ref 70–130)
GLUCOSE BLDC GLUCOMTR-MCNC: 237 MG/DL (ref 70–130)
GLUCOSE BLDC GLUCOMTR-MCNC: 298 MG/DL (ref 70–130)
GLUCOSE SERPL-MCNC: 167 MG/DL (ref 65–99)
GRAM STN SPEC: NORMAL
HCT VFR BLD AUTO: 25.4 % (ref 37.5–51)
HGB BLD-MCNC: 8.3 G/DL (ref 13–17.7)
IMM GRANULOCYTES # BLD AUTO: 0.28 10*3/MM3 (ref 0–0.05)
IMM GRANULOCYTES NFR BLD AUTO: 1.4 % (ref 0–0.5)
LYMPHOCYTES # BLD AUTO: 2.27 10*3/MM3 (ref 0.7–3.1)
LYMPHOCYTES NFR BLD AUTO: 11 % (ref 19.6–45.3)
MAGNESIUM SERPL-MCNC: 2.4 MG/DL (ref 1.6–2.6)
MCH RBC QN AUTO: 28.5 PG (ref 26.6–33)
MCHC RBC AUTO-ENTMCNC: 32.7 G/DL (ref 31.5–35.7)
MCV RBC AUTO: 87.3 FL (ref 79–97)
MONOCYTES # BLD AUTO: 1.52 10*3/MM3 (ref 0.1–0.9)
MONOCYTES NFR BLD AUTO: 7.4 % (ref 5–12)
NEUTROPHILS NFR BLD AUTO: 15.89 10*3/MM3 (ref 1.7–7)
NEUTROPHILS NFR BLD AUTO: 77 % (ref 42.7–76)
NRBC BLD AUTO-RTO: 0.1 /100 WBC (ref 0–0.2)
PHOSPHATE SERPL-MCNC: 3.9 MG/DL (ref 2.5–4.5)
PLATELET # BLD AUTO: 466 10*3/MM3 (ref 140–450)
PMV BLD AUTO: 8.7 FL (ref 6–12)
POTASSIUM SERPL-SCNC: 4.2 MMOL/L (ref 3.5–5.2)
PROT SERPL-MCNC: 6.8 G/DL (ref 6–8.5)
RBC # BLD AUTO: 2.91 10*6/MM3 (ref 4.14–5.8)
SODIUM SERPL-SCNC: 141 MMOL/L (ref 136–145)
UNIT  ABO: NORMAL
UNIT  RH: NORMAL
URATE SERPL-MCNC: 5.7 MG/DL (ref 3.4–7)
WBC NRBC COR # BLD AUTO: 20.64 10*3/MM3 (ref 3.4–10.8)

## 2023-12-08 PROCEDURE — 83735 ASSAY OF MAGNESIUM: CPT | Performed by: INTERNAL MEDICINE

## 2023-12-08 PROCEDURE — 63710000001 INSULIN LISPRO (HUMAN) PER 5 UNITS: Performed by: HOSPITALIST

## 2023-12-08 PROCEDURE — 85025 COMPLETE CBC W/AUTO DIFF WBC: CPT | Performed by: INTERNAL MEDICINE

## 2023-12-08 PROCEDURE — 84550 ASSAY OF BLOOD/URIC ACID: CPT | Performed by: INTERNAL MEDICINE

## 2023-12-08 PROCEDURE — 94664 DEMO&/EVAL PT USE INHALER: CPT

## 2023-12-08 PROCEDURE — 71046 X-RAY EXAM CHEST 2 VIEWS: CPT

## 2023-12-08 PROCEDURE — 25010000002 HEPARIN (PORCINE) PER 1000 UNITS: Performed by: HOSPITALIST

## 2023-12-08 PROCEDURE — 82948 REAGENT STRIP/BLOOD GLUCOSE: CPT

## 2023-12-08 PROCEDURE — 99232 SBSQ HOSP IP/OBS MODERATE 35: CPT | Performed by: STUDENT IN AN ORGANIZED HEALTH CARE EDUCATION/TRAINING PROGRAM

## 2023-12-08 PROCEDURE — 25010000002 PIPERACILLIN SOD-TAZOBACTAM PER 1 G: Performed by: STUDENT IN AN ORGANIZED HEALTH CARE EDUCATION/TRAINING PROGRAM

## 2023-12-08 PROCEDURE — 63710000001 INSULIN GLARGINE PER 5 UNITS: Performed by: NURSE PRACTITIONER

## 2023-12-08 PROCEDURE — 94799 UNLISTED PULMONARY SVC/PX: CPT

## 2023-12-08 PROCEDURE — 94761 N-INVAS EAR/PLS OXIMETRY MLT: CPT

## 2023-12-08 PROCEDURE — 84100 ASSAY OF PHOSPHORUS: CPT | Performed by: INTERNAL MEDICINE

## 2023-12-08 PROCEDURE — 87070 CULTURE OTHR SPECIMN AEROBIC: CPT | Performed by: HOSPITALIST

## 2023-12-08 PROCEDURE — 97530 THERAPEUTIC ACTIVITIES: CPT

## 2023-12-08 PROCEDURE — 87205 SMEAR GRAM STAIN: CPT | Performed by: HOSPITALIST

## 2023-12-08 PROCEDURE — 80053 COMPREHEN METABOLIC PANEL: CPT | Performed by: INTERNAL MEDICINE

## 2023-12-08 PROCEDURE — 97110 THERAPEUTIC EXERCISES: CPT

## 2023-12-08 RX ORDER — HEPARIN SODIUM 5000 [USP'U]/ML
5000 INJECTION, SOLUTION INTRAVENOUS; SUBCUTANEOUS EVERY 8 HOURS SCHEDULED
Status: DISCONTINUED | OUTPATIENT
Start: 2023-12-08 | End: 2023-12-14 | Stop reason: HOSPADM

## 2023-12-08 RX ADMIN — ATORVASTATIN CALCIUM 20 MG: 20 TABLET, FILM COATED ORAL at 20:40

## 2023-12-08 RX ADMIN — IPRATROPIUM BROMIDE AND ALBUTEROL SULFATE 3 ML: 2.5; .5 SOLUTION RESPIRATORY (INHALATION) at 00:06

## 2023-12-08 RX ADMIN — BUDESONIDE AND FORMOTEROL FUMARATE DIHYDRATE 2 PUFF: 160; 4.5 AEROSOL RESPIRATORY (INHALATION) at 22:12

## 2023-12-08 RX ADMIN — INSULIN LISPRO 5 UNITS: 100 INJECTION, SOLUTION INTRAVENOUS; SUBCUTANEOUS at 12:09

## 2023-12-08 RX ADMIN — INSULIN LISPRO 5 UNITS: 100 INJECTION, SOLUTION INTRAVENOUS; SUBCUTANEOUS at 21:41

## 2023-12-08 RX ADMIN — FINASTERIDE 5 MG: 5 TABLET, FILM COATED ORAL at 08:15

## 2023-12-08 RX ADMIN — IPRATROPIUM BROMIDE AND ALBUTEROL SULFATE 3 ML: 2.5; .5 SOLUTION RESPIRATORY (INHALATION) at 14:26

## 2023-12-08 RX ADMIN — Medication 4 ML: at 22:12

## 2023-12-08 RX ADMIN — IPRATROPIUM BROMIDE AND ALBUTEROL SULFATE 3 ML: 2.5; .5 SOLUTION RESPIRATORY (INHALATION) at 19:39

## 2023-12-08 RX ADMIN — INSULIN GLARGINE 30 UNITS: 100 INJECTION, SOLUTION SUBCUTANEOUS at 21:41

## 2023-12-08 RX ADMIN — Medication 4 ML: at 06:46

## 2023-12-08 RX ADMIN — INSULIN LISPRO 8 UNITS: 100 INJECTION, SOLUTION INTRAVENOUS; SUBCUTANEOUS at 17:08

## 2023-12-08 RX ADMIN — MUPIROCIN 1 APPLICATION: 20 OINTMENT TOPICAL at 08:15

## 2023-12-08 RX ADMIN — MONTELUKAST SODIUM 10 MG: 10 TABLET, FILM COATED ORAL at 08:15

## 2023-12-08 RX ADMIN — PIPERACILLIN SODIUM AND TAZOBACTAM SODIUM 4.5 G: 4; .5 INJECTION, SOLUTION INTRAVENOUS at 08:15

## 2023-12-08 RX ADMIN — GABAPENTIN 300 MG: 300 CAPSULE ORAL at 14:52

## 2023-12-08 RX ADMIN — IPRATROPIUM BROMIDE AND ALBUTEROL SULFATE 3 ML: 2.5; .5 SOLUTION RESPIRATORY (INHALATION) at 10:33

## 2023-12-08 RX ADMIN — ZINC OXIDE 1 APPLICATION: 200 OINTMENT TOPICAL at 20:48

## 2023-12-08 RX ADMIN — METOPROLOL TARTRATE 100 MG: 50 TABLET, FILM COATED ORAL at 08:14

## 2023-12-08 RX ADMIN — IPRATROPIUM BROMIDE AND ALBUTEROL SULFATE 3 ML: 2.5; .5 SOLUTION RESPIRATORY (INHALATION) at 06:45

## 2023-12-08 RX ADMIN — GABAPENTIN 300 MG: 300 CAPSULE ORAL at 06:55

## 2023-12-08 RX ADMIN — HEPARIN SODIUM 5000 UNITS: 5000 INJECTION INTRAVENOUS; SUBCUTANEOUS at 14:52

## 2023-12-08 RX ADMIN — FAMOTIDINE 40 MG: 20 TABLET ORAL at 06:55

## 2023-12-08 RX ADMIN — ZINC OXIDE 1 APPLICATION: 200 OINTMENT TOPICAL at 08:15

## 2023-12-08 RX ADMIN — BUDESONIDE AND FORMOTEROL FUMARATE DIHYDRATE 2 PUFF: 160; 4.5 AEROSOL RESPIRATORY (INHALATION) at 06:47

## 2023-12-08 RX ADMIN — METOPROLOL TARTRATE 100 MG: 50 TABLET, FILM COATED ORAL at 20:40

## 2023-12-08 RX ADMIN — IPRATROPIUM BROMIDE AND ALBUTEROL SULFATE 3 ML: 2.5; .5 SOLUTION RESPIRATORY (INHALATION) at 04:05

## 2023-12-08 RX ADMIN — HEPARIN SODIUM 5000 UNITS: 5000 INJECTION INTRAVENOUS; SUBCUTANEOUS at 21:41

## 2023-12-08 RX ADMIN — PIPERACILLIN SODIUM AND TAZOBACTAM SODIUM 4.5 G: 4; .5 INJECTION, SOLUTION INTRAVENOUS at 14:52

## 2023-12-08 RX ADMIN — GABAPENTIN 300 MG: 300 CAPSULE ORAL at 21:41

## 2023-12-08 RX ADMIN — IPRATROPIUM BROMIDE AND ALBUTEROL SULFATE 3 ML: 2.5; .5 SOLUTION RESPIRATORY (INHALATION) at 23:35

## 2023-12-08 NOTE — PROGRESS NOTES
LOS: 2 days     Chief Complaint: Pneumonia    Interval History: Patient reports he is feeling better today.  States his shortness of breath is improving.  Cough is improved as well.  WBC down to 20.  On 2 L which is his chronic requirements.    Vital Signs  Temp:  [97.3 °F (36.3 °C)-98.4 °F (36.9 °C)] 97.7 °F (36.5 °C)  Heart Rate:  [78-96] 84  Resp:  [16-20] 20  BP: (106-149)/(60-83) 130/60    Physical Exam:  General: In no acute distress  HEENT: Oropharynx clear, moist mucous membranes  Respiratory: Normal work of breathing on 2 L  Skin: No rashes or lesions  Extremities: No edema, cyanosis  Access: Peripheral IV    Antibiotics:  Anti-Infectives (From admission, onward)      Ordered     Dose/Rate Route Frequency Start Stop    12/07/23 0850  piperacillin-tazobactam (ZOSYN) 4.5 g in iso-osmotic dextrose 100 mL IVPB (premix)        Ordering Provider: Byorn Hinds DO    4.5 g  over 4 Hours Intravenous Every 8 Hours 12/07/23 1545 12/14/23 1544    12/07/23 0850  piperacillin-tazobactam (ZOSYN) 4.5 g in iso-osmotic dextrose 100 mL IVPB (premix)        Ordering Provider: Byron Hinds DO    4.5 g  over 30 Minutes Intravenous Once 12/07/23 0945 12/07/23 1025             Results Review:     I reviewed the patient's new clinical results.    Lab Results   Component Value Date    WBC 20.64 (H) 12/08/2023    HGB 8.3 (L) 12/08/2023    HCT 25.4 (L) 12/08/2023    MCV 87.3 12/08/2023     (H) 12/08/2023     Lab Results   Component Value Date    GLUCOSE 167 (H) 12/08/2023    BUN 35 (H) 12/08/2023    CREATININE 2.71 (H) 12/08/2023    EGFRIFNONA 46 (L) 07/27/2021    BCR 12.9 12/08/2023    CO2 27.0 12/08/2023    CALCIUM 9.0 12/08/2023    PROTENTOTREF 7.1 08/22/2022    ALBUMIN 3.1 (L) 12/08/2023    LABIL2 0.8 08/22/2022    AST 23 12/08/2023    ALT 27 12/08/2023       Microbiology:  Outside studies:  - Sputum culture Achromobacter xylosoxidans  -MRSA nares positive  -Abdominal wound culture no growth    12/7  Legionella urine antigen negative  12/7 strep pneumo urine antigen negative  12/7 MRSA nares negative  12/7 respiratory culture rejected  12/8 respiratory culture in process    Assessment    #Right lower lobe pneumonia  #ERIC on CKD  #Acute hypoxic respiratory failure  #Bronchiectasis with acute exacerbation  #Morbid obesity, BMI 41  #Type 2 diabetes    Plan to continue Zosyn 4.5 every 8 hours targeting Achromobacter.  Repeat culture initially rejected and now a new sample is pending.  Plan for 7 days of therapy for pneumonia through 12/14.

## 2023-12-08 NOTE — THERAPY TREATMENT NOTE
Patient Name: Preston Wallis  : 1965    MRN: [id number]                              Today's Date: 2023       Admit Date: 2023    Visit Dx: No diagnosis found.  Patient Active Problem List   Diagnosis    Polyneuropathy    Paroxysmal atrial fibrillation    Obstructive sleep apnea    MRSA pneumonia    Low back pain    Chronic diastolic heart failure    Allergies    COPD exacerbation    Chronic anticoagulation    Benign prostatic hyperplasia    Impaired mobility and endurance    Stage 3a chronic kidney disease    Iron deficiency anemia secondary to inadequate dietary iron intake    Vitamin D deficiency    Class 3 severe obesity with serious comorbidity in adult    Lower extremity edema    Elevated alkaline phosphatase level    Venous insufficiency (chronic) (peripheral)    Tobacco abuse, in remission    History of Pseudomonas pneumonia    Chronic dyspnea    Gastroesophageal reflux disease    Bronchiectasis without complication    ERIC (acute kidney injury)    Altered mental status    Hyperlipidemia    Luetscher's syndrome    Neurogenic bladder    Class 1 obesity    Pneumonia due to Pseudomonas species    Seizures    Primary osteoarthritis of left knee    Other constipation    Chronic obstructive pulmonary disease    Type 2 DM with CKD stage 3 and hypertension    Essential hypertension    Stage 3b chronic kidney disease    Annual physical exam    Long-term use of high-risk medication    Personal history of PE (pulmonary embolism)    Encounter for aftercare for healing closed traumatic fracture of left femur    Chronic pain of left knee    Primary osteoarthritis of right knee    Sepsis    Bronchiectasis    Bacterial pneumonia    Anemia     Past Medical History:   Diagnosis Date    Age-related cognitive decline     Allergic contact dermatitis     Allergies     Anemia     Bronchiectasis with acute lower respiratory infection     Charcot foot due to diabetes mellitus 9/10/2013    Chronic diastolic  (congestive) heart failure     Chronic kidney disease     Chronic respiratory failure with hypoxia     Closed supracondylar fracture of femur 1/12/2022    COPD (chronic obstructive pulmonary disease)     Deep vein thrombosis (DVT) of lower extremity associated with air travel 1/13/2023    Dependence on supplemental oxygen     Eczema     Erectile dysfunction     due to organic reasons    Essential (primary) hypertension     Fracture     closed fracture of other tarsal and metatarsal bones    Fracture of proximal humerus 1/13/2023    GERD without esophagitis     High risk medication use     Hypercholesteremia     Hypomagnesemia     Infected stasis ulcer of left lower extremity 1/13/2023    Insomnia     Low back pain     Major depressive disorder     Morbid (severe) obesity due to excess calories     MRSA pneumonia     Muscle weakness     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 without evidence of necrosis     Obstructive sleep apnea (adult) (pediatric)     Other forms of dyspnea     Other long term (current) drug therapy     Other specified noninfective gastroenteritis and colitis     Other spondylosis, lumbar region     Pain in both knees     Paroxysmal atrial fibrillation     Peripheral neuropathy     attributed to type 2 diabetes    Pneumonia, unspecified organism     Polyneuropathy     Rash and other nonspecific skin eruption     Syncope and collapse     Tachycardia     Tinnitus 1/13/2023    Type 1 diabetes mellitus with diabetic chronic kidney disease     Type 2 diabetes mellitus     Unspecified fall, initial encounter     Urinary retention      Past Surgical History:   Procedure Laterality Date    CHOLECYSTECTOMY      CYSTOSCOPY      FEMUR SURGERY Left     Shravan placed    KNEE SURGERY Left     OTHER SURGICAL HISTORY Left     venous port    TONSILLECTOMY AND ADENOIDECTOMY        General Information       Row Name 12/08/23 2278          Physical Therapy Time and Intention     Document Type therapy note (daily note)  -     Mode of Treatment individual therapy;physical therapy  -[facility] Name 12/08/23 1706          General Information    Patient Profile Reviewed yes  -     Existing Precautions/Restrictions fall;oxygen therapy device and L/min  -[facility] Name 12/08/23 1706          Cognition    Orientation Status (Cognition) oriented x 4  -[facility] Name 12/08/23 1706          Safety Issues, Functional Mobility    Impairments Affecting Function (Mobility) balance;endurance/activity tolerance;strength;shortness of breath  -               User Key  (r) = Recorded By, (t) = Taken By, (c) = Cosigned By      Initials Name Provider Type     Lizet Mckeon PT Physical Therapist                   Mobility       Row Name 12/08/23 1706          Transfers    Comment, (Transfers) Pt refuses any mobility, agreeable to LE exercises in chair only  -               User Key  (r) = Recorded By, (t) = Taken By, (c) = Cosigned By      Initials Name Provider Type     Lizet Mckeon PT Physical Therapist                   Obj/Interventions       [facility] Name 12/08/23 1718          Motor Skills    Therapeutic Exercise --  10 reps B AP/SLR/hip abduction/heel slides/QS/GS  -               User Key  (r) = Recorded By, (t) = Taken By, (c) = Cosigned By      Initials Name Provider Type     Lizet Mckeon, PT Physical Therapist                   Goals/Plan    No documentation.                  Clinical Impression       [facility] Name 12/08/23 1718          Pain    Pretreatment Pain Rating 0/10 - no pain  -     Posttreatment Pain Rating 0/10 - no pain  -[facility] Name 12/08/23 1718          Plan of Care Review    Plan of Care Reviewed With patient  -     Progress no change  -     Outcome Evaluation Pt seen for PT this PM, initially declining therapy but then c/o LE weakness and agreeable to LE exercises in chair. Pt completed 10 reps BLE AP/SLR/hip abduction/heel slides/GS/QS. Cued  for full range and requiring intermittent assist, especially with SLR. Pt encouraged to perform 3x/day and continue transferring with staff as able. PT will continue to follow to progress mobility as tolerated. Anticipate DC to SNF pending progress.  -       Row Name 12/08/23 1718          Vital Signs    O2 Delivery Pre Treatment supplemental O2  -     O2 Delivery Intra Treatment supplemental O2  -     O2 Delivery Post Treatment supplemental O2  -[facility] Name 12/08/23 1718          Positioning and Restraints    Pre-Treatment Position sitting in chair/recliner  -     Post Treatment Position chair  -     In Chair notified nsg;reclined;call light within reach;encouraged to call for assist;exit alarm on  -               User Key  (r) = Recorded By, (t) = Taken By, (c) = Cosigned By      Initials Name Provider Type     Lizet Mckeon, PT Physical Therapist                   Outcome Measures       Row Name 12/08/23 1721 12/08/23 0815       How much help from another person do you currently need...    Turning from your back to your side while in flat bed without using bedrails? 3  - 3  -KE    Moving from lying on back to sitting on the side of a flat bed without bedrails? 3  - 3  -KE    Moving to and from a bed to a chair (including a wheelchair)? 2  - 2  -KE    Standing up from a chair using your arms (e.g., wheelchair, bedside chair)? 2  - 2  -KE    Climbing 3-5 steps with a railing? 1  - 1  -KE    To walk in hospital room? 1  - 1  -KE    AM-[facility] 6 Clicks Score (PT) [facility] 12  -KE    Highest Level of Mobility Goal 4 --> Transfer to chair/commode  - 4 --> Transfer to chair/commode  -      Row Name 12/08/23 1721 12/08/23 1325       Functional Assessment    Outcome Measure Options AM-PAC 6 Clicks Basic Mobility (PT)  - AM-[facility] 6 Clicks Daily Activity (OT)  -              User Key  (r) = Recorded By, (t) = Taken By, (c) = Cosigned By      Initials Name Provider Type    ALEXY Shelby  Grecia, OT Occupational Therapist     Lizet Mckeon, PT Physical Therapist    Oliva Lombardi, RN Registered Nurse                                 Physical Therapy Education       Title: PT OT SLP Therapies (In Progress)       Topic: Physical Therapy (Done)       Point: Mobility training (Done)       Learning Progress Summary             Patient Acceptance, E, VU by ER at 12/7/2023 1144                         Point: Home exercise program (Done)       Learning Progress Summary             Patient Acceptance, E,TB,D, VU,NR by  at 12/8/2023 1722    Acceptance, E, VU by ER at 12/7/2023 1144                         Point: Body mechanics (Done)       Learning Progress Summary             Patient Acceptance, E, VU by ER at 12/7/2023 1144                         Point: Precautions (Done)       Learning Progress Summary             Patient Acceptance, E, VU by ER at 12/7/2023 1144                                         User Key       Initials Effective Dates Name Provider Type Discipline     04/08/22 -  Lizet Mckeon, PT Physical Therapist PT    ER 10/15/23 -  Cat Burris PT Physical Therapist PT                  PT Recommendation and Plan     Plan of Care Reviewed With: patient  Progress: no change  Outcome Evaluation: Pt seen for PT this PM, initially declining therapy but then c/o LE weakness and agreeable to LE exercises in chair. Pt completed 10 reps BLE AP/SLR/hip abduction/heel slides/GS/QS. Cued for full range and requiring intermittent assist, especially with SLR. Pt encouraged to perform 3x/day and continue transferring with staff as able. PT will continue to follow to progress mobility as tolerated. Anticipate DC to SNF pending progress.     Time Calculation:         PT Charges       Row Name 12/08/23 1722             Time Calculation    Start Time 1630  -      Stop Time 1641  -      Time Calculation (min) 11 min  -      PT Received On 12/08/23  -      PT - Next Appointment 12/11/23  -          Time Calculation- PT    Total Timed Code Minutes- PT 11 minute(s)  -         Timed Charges    35377 - PT Therapeutic Exercise Minutes 11  -BH         Total Minutes    Timed Charges Total Minutes 11  -BH       Total Minutes 11  -BH                User Key  (r) = Recorded By, (t) = Taken By, (c) = Cosigned By      Initials Name Provider Type    Lizet Hardin, PT Physical Therapist                  Therapy Charges for Today       Code Description Service Date Service Provider Modifiers Qty    59652886640 HC PT THER PROC EA 15 MIN 12/8/2023 Lizet Mckeon PT GP 1            PT G-Codes  Outcome Measure Options: AM-PAC 6 Clicks Basic Mobility (PT)  AM-PAC 6 Clicks Score (PT): 12  AM-PAC 6 Clicks Score (OT): 14  PT Discharge Summary  Anticipated Discharge Disposition (PT): skilled nursing facility    Lizet Mckeon PT  12/8/2023

## 2023-12-08 NOTE — PLAN OF CARE
Goal Outcome Evaluation:  Plan of Care Reviewed With: patient        Progress: no change  Outcome Evaluation: Pt is able to transfer to the chair today with mod A from OT and cues for technique. Pt then declines participation in any additional transfers, ADLs, exercise due to fatique after pivoting to the chair. OT discussed dc recommendation to SNF but pt declines reporting he will go home with HH, family, and DME. OT does have concerns with pt current weakness, endurance, risk of falls. RN reports pt sat in chair most of the day yesterday and required max AX2 from nsg to transfer back to the bed.      Anticipated Discharge Disposition (OT): skilled nursing facility, home with home health, home with assist

## 2023-12-08 NOTE — DISCHARGE PLACEMENT REQUEST
"Preston Wallis \"Gómez\" (58 y.o. Male)       Date of Birth   1965    Social Security Number       Address   [address] [place] [address]    Home Phone   [phone]    MRN   [id number]       Restorationist   Shinto    Marital Status                               Admission Date   12/6/23    Admission Type   Urgent    Admitting Provider   Iam Eller MD    Attending Provider   Iam Eller MD    Department, Room/Bed   [address], [address]/1       Discharge Date       Discharge Disposition       Discharge Destination                                 Attending Provider: Ima Eller MD    Allergies: Benadryl [Diphenhydramine], Proventil [Albuterol]    Isolation: Contact   Infection: MRSA (04/06/22)   Code Status: CPR    Ht: 175.3 cm (69\")   Wt: 126 kg (277 lb 12.5 oz)    Admission Cmt: None   Principal Problem: Bacterial pneumonia [J15.9]                   Active Insurance as of 12/6/2023       Primary Coverage       Payor Plan Insurance Group Employer/Plan Group    [facility] BY YING [facility] BY YING [id number]       Payor Plan Address Payor Plan Phone Number Payor Plan Fax Number Effective Dates    [address] [address]   7/1/2022 - None Entered    [facility] [address]         Subscriber Name Subscriber Birth Date Member ID       PRESTON WALLIS 1965 [id number]                     Emergency Contacts        (Rel.) Home Phone Work Phone Mobile Phone    Kami Wallis (Spouse) [phone]                "

## 2023-12-08 NOTE — PLAN OF CARE
Goal Outcome Evaluation:  Plan of Care Reviewed With: patient        Progress: no change  Outcome Evaluation: Pt seen for PT this PM, initially declining therapy but then c/o LE weakness and agreeable to LE exercises in chair. Pt completed 10 reps BLE AP/SLR/hip abduction/heel slides/GS/QS. Cued for full range and requiring intermittent assist, especially with SLR. Pt encouraged to perform 3x/day and continue transferring with staff as able. PT will continue to follow to progress mobility as tolerated. Anticipate DC to SNF pending progress.      Anticipated Discharge Disposition (PT): skilled nursing facility

## 2023-12-08 NOTE — PAYOR COMM NOTE
"Preston Wallis \"Gómez\" (58 y.o. Male)        PLEASE SEE ATTACHED FOR INPT AUTH.     DX:  A41.9  J18.9  J47.9  J44.9    PLEASE CALL   OR  [phone]    THANK YOU    CYNDI OBANDO LPN CCP   Date of Birth   1965    Social Security Number       Address   [address] [place] [address]    Home Phone   [phone]    MRN   [id number]       Confucianism   Cheondoism    Marital Status                               Admission Date   12/6/23    Admission Type   Urgent    Admitting Provider   Iam Eller MD    Attending Provider   Iam Eller MD    Department, Room/Bed   [address], S620/1       Discharge Date       Discharge Disposition       Discharge Destination                                 Attending Provider: Iam Eller MD    Allergies: Benadryl [Diphenhydramine], Proventil [Albuterol]    Isolation: Contact   Infection: MRSA (04/06/22)   Code Status: CPR    Ht: 175.3 cm (69\")   Wt: 126 kg (277 lb 12.5 oz)    Admission Cmt: None   Principal Problem: None                  Active Insurance as of 12/6/2023       Primary Coverage       Payor Plan Insurance Group Employer/Plan Group    [employer] BY YING [facility] BY YING [id number]       Payor Plan Address Payor Plan Phone Number Payor Plan Fax Number Effective Dates    [address] [address]   7/1/2022 - None Entered    [facility] [phone]         Subscriber Name Subscriber Birth Date Member ID       PRESTON WALLIS 1965 [id number]                     Emergency Contacts        (Rel.) Home Phone Work Phone Mobile Phone    Kami Wallis (Spouse) [phone]              [place]: NPI [id number]  Tax ID [id number]     History & Physical        Fanny Lopez APRN at 12/05/23 0000       Attestation signed by Iam Eller MD at 12/07/23 0935    Addendum: I have reviewed the history and plan as obtained by CON Montez and have " "performed my own independent history. I have personally examined the patient and my exam confirms her physical findings. I have performed >50% of the MDM for this split/shared service. I agree with the plan as listed below, with the addition of the followinyo gentleman, former smoker, with COPD, chronic bronchiectasis, CHRF, MILADY, chronic diastolic CHF, CKD3b, DM2, HTN, HLD, PAF (Eliquis), neurogenic bladder, morbid obesity, and prior h/o both MRSA PNA and Pseudomonas PNA, who was admitted to [facility] on  for RLL PNA. He was initially treated with Vanc/Cefepime and MRSA screen was positive. Sputum culture grew Achromobacter xylosoxidans on 12/3 and he was changed to Zosyn/Bactrim. Despite this he had worsening of O2 requirements and persistent leukocytosis. Renal function worsened so Bactrim was changed to Zyvox and arrangements were made to transfer him to [facility] so that he could evaluated by Infectious Disease, Pulm, and Renal services.     This AM he is doing \"fine\" and denies N/V/D/abd pain/F/C/NS/SOA/CP/palp/HA/vis changes.  Tolerating diet, eating well. Making urine.    Alert in NAD, chronically ill-appearing, non-toxic  HEENT: unremarkable, mmm  Neck: supple  Lungs: coarse rhonchi bilaterally R>L  CV: RRR, pulses intact  Abd: soft NTND, +BS  Extr: WWP, trace edema in BLEs, BCR<2sec  Skin: warm and dry, pale, tattoos  Psych: very pleasant  Neuro: no focal deficits  : lopez in place with clear yellow urine in bag      57yo gentleman transferred from [facility] with worsening hypoxia and worsening renal function in setting of polymicrobial PNA, COPD, and bronchiectasis.    RLL PNA (MRSA and Achromobacter)  Acute on chronic hypoxic resp failure  Pulm and ID consulted  Afebrile and VSS, stable on 4L/min (baseline 2L/min)  ID has changed Zyvox to Zosyn and ordered repeat cultures    COPD  Chronic bronchiectasis  Pulm consult  Supplemental O2  Treat pulm infection  Scheduled DuIsa and " Symbicort    ERIC/CKD3b  Renal consult    Anemia NOS  Hgb down to 6.9 this AM  Transfuse a unit of PRBCs  Check iron panel and vitamin levels    Abd wall cellulitis/abscess  Resolved after I&D by Surg at [facility]    PAF (Eliquis)  NSR on EKG here  HRs acceptable on metoprolol and BPs tolerating  Eliquis on hold due to bleeding from abd wall I&D site (now resolved)--restart AC if no procedures planned (?bronch)    Chronic diastolic CHF  Hold Bumex due to ERIC  Defer volume status to Renal for now  ?Card consult    DM2  A1c 8.4  Continue Lantus/SSI and monitor sugars closely  Holding Bydureon and Farxiga    HTN  BPs acceptable  Continue metoprolol    Neurogenic bladder  Chronic urinary retention  Pt self-caths 4 times/day normally  Doyle cath in place for now    Morbid obesity  Complicating all aspects of care    Bedbug infestation    Further orders to follow as suggested by evolving hospital course   D/w pt. D/w RN, CCP, and treatment team at morning huddle  Full code confirmed  SCDs for DVT ppx--change to therapeutic Lovenox dosing if no procedures planned.                      Patient Name:  Preston Wallis  YOB: 1965  MRN:  [id number]  Admit Date:  12/6/2023  Patient Care Team:  Kimmy Riley MD as PCP - General (Family Medicine)  Ermelinda Wesley LPN as Licensed Practical Nurse  Lino Ware MD as Consulting Physician (Cardiology)  Severiano Carolina MD as Consulting Physician (Pulmonary Disease)  Joseph Martínez MD as Consulting Physician (Nephrology)  Tara Rosado RN as Ambulatory  (Population Health)  Teresita White MD as Consulting Physician (Urology)  Neli Paredes APRN as Nurse Practitioner (Endocrinology)      Subjective  History Present Illness     Chief Complaint: Shortness of breath    Mr. Wallis is a 58 y.o. former smoker with a history of COPD, chronic diastolic CHF, pseudomonas pneumonia, MRSA pneumonia, chronic respiratory failure with hypoxia,  chronic bronchiectasis, type 2 diabetes mellitus, hypertension, hyperlipidemia, paroxysmal atrial fibrillation, and MILADY  that presents to [facility] complaining of shortness of breath.  He was transferred from [facility] following sputum cultures that grew achromobacter xylosoxidans.  He initially presented to [facility] on 11/28/2023 and a CT showed evidence of right lower lobe pneumonia.  A respiratory viral panel was negative and a MRSA swab was positive.  He was started on Vancomycin and Cefepime.  Once the sputum cultures resulted on 12/3/2023, he was started on Bactrim and Zosyn.  He showed evidence of worsening pneumonia after a few days and his oxygen requirements increased to 4L NC from his baseline of 2L NC. He also had persistent leukocytosis. The Bactrim was discontinued and he was started on Zyvox for MRSA pneumonia.  He was also thought to be having a bronchiectasis exacerbation.  His kidney function has also worsened and he was transferred to [facility] for evaluation by infectious disease and nephrology.      History of Present Illness  Review of Systems   Constitutional:  Positive for fever. Negative for chills.   HENT:  Negative for congestion and sore throat.    Eyes:  Negative for photophobia and visual disturbance.   Respiratory:  Positive for wheezing. Negative for cough, chest tightness and shortness of breath.    Cardiovascular:  Negative for chest pain and leg swelling.   Gastrointestinal:  Negative for abdominal pain, constipation, diarrhea, nausea and vomiting.   Endocrine: Negative for polydipsia, polyphagia and polyuria.   Musculoskeletal:  Negative for arthralgias and myalgias.   Skin:  Negative for color change and wound.   Neurological:  Negative for dizziness, weakness, light-headedness, numbness and headaches.        Personal History     Past Medical History:   Diagnosis Date    Age-related cognitive decline     Allergic contact  dermatitis     Allergies     Anemia     Bronchiectasis with acute lower respiratory infection     Charcot foot due to diabetes mellitus 9/10/2013    Chronic diastolic (congestive) heart failure     Chronic kidney disease     Chronic respiratory failure with hypoxia     Closed supracondylar fracture of femur 1/12/2022    COPD (chronic obstructive pulmonary disease)     Deep vein thrombosis (DVT) of lower extremity associated with air travel 1/13/2023    Dependence on supplemental oxygen     Eczema     Erectile dysfunction     due to organic reasons    Essential (primary) hypertension     Fracture     closed fracture of other tarsal and metatarsal bones    Fracture of proximal humerus 1/13/2023    GERD without esophagitis     High risk medication use     Hypercholesteremia     Hypomagnesemia     Infected stasis ulcer of left lower extremity 1/13/2023    Insomnia     Low back pain     Major depressive disorder     Morbid (severe) obesity due to excess calories     MRSA pneumonia     Muscle weakness     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 without evidence of necrosis     Obstructive sleep apnea (adult) (pediatric)     Other forms of dyspnea     Other long term (current) drug therapy     Other specified noninfective gastroenteritis and colitis     Other spondylosis, lumbar region     Pain in both knees     Paroxysmal atrial fibrillation     Peripheral neuropathy     attributed to type 2 diabetes    Pneumonia, unspecified organism     Polyneuropathy     Rash and other nonspecific skin eruption     Syncope and collapse     Tachycardia     Tinnitus 1/13/2023    Type 1 diabetes mellitus with diabetic chronic kidney disease     Type 2 diabetes mellitus     Unspecified fall, initial encounter     Urinary retention      Past Surgical History:   Procedure Laterality Date    CHOLECYSTECTOMY      CYSTOSCOPY      FEMUR SURGERY Left     Srhavan placed    KNEE SURGERY Left     OTHER SURGICAL  HISTORY Left     venous port    TONSILLECTOMY AND ADENOIDECTOMY       Family History   Problem Relation Age of Onset    Coronary artery disease Mother     Hypertension Mother     Diabetes type II Mother     Asthma Father     Diabetes type II Sister     Cancer Sister      Social History     Tobacco Use    Smoking status: Former     Packs/day: 1.00     Years: 12.00     Additional pack years: 0.00     Total pack years: 12.00     Types: Cigarettes     Start date:      Quit date:      Years since quittin.9    Smokeless tobacco: Never   Vaping Use    Vaping Use: Never used   Substance Use Topics    Alcohol use: Not Currently    Drug use: Never     Medications Prior to Admission   Medication Sig Dispense Refill Last Dose    atorvastatin (LIPITOR) 20 MG tablet Take 1 tablet by mouth Every Night. 90 tablet 2 2023    docusate sodium (COLACE) 100 MG capsule Take 1 capsule by mouth Daily. 90 capsule 2 2023    DULoxetine (CYMBALTA) 60 MG capsule Take 1 capsule by mouth 2 (Two) Times a Day. 180 capsule 2 2023    finasteride (PROSCAR) 5 MG tablet Take 1 tablet by mouth Daily. 90 tablet 2 2023    folic acid (FOLVITE) 1 MG tablet Take 1 tablet by mouth Daily. 90 tablet 2 2023    gabapentin (NEURONTIN) 300 MG capsule TAKE ONE CAPSULE BY MOUTH EVERY MORNING AND TAKE TWO CAPSULES BY MOUTH EVERY NIGHT AT BEDTIME 270 capsule 2 2023    metoprolol tartrate (LOPRESSOR) 100 MG tablet Take 1 tablet by mouth 2 (Two) Times a Day. 189 tablet 2 2023    montelukast (SINGULAIR) 10 MG tablet Take 1 tablet by mouth every night at bedtime. 90 tablet 2 2023    apixaban (Eliquis) 5 MG tablet tablet Take 1 tablet by mouth Every 12 (Twelve) Hours. (Patient taking differently: Take 1 tablet by mouth Every 12 (Twelve) Hours. On hold by MD at [facility]) 180 tablet 2 Unknown    B-D UF III MINI PEN NEEDLES 31G X 5 MM misc USE FOUR TIMES DAILY BEFORE MEALS AND AT BEDTIME 360 each 1 Unknown     Budeson-Glycopyrrol-Formoterol (Breztri Aerosphere) 160-9-4.8 MCG/ACT aerosol inhaler    Unknown    bumetanide (BUMEX) 2 MG tablet Take 1 tablet BY MOUTH TWICE DAILY. call cardiology IF weight is greater THAN 2 pounds in 1 DAY OR 5 pounds in A WEEK. (Patient taking differently: Take 1 tablet BY MOUTH TWICE DAILY. call cardiology IF weight is greater THAN 2 pounds in 1 DAY OR 5 pounds in A WEEK.    To be discontinued by MD at [facility]) 180 tablet 2 Unknown    calcium citrate (CALCITRATE) 950 (200 Ca) MG tablet Take 1 tablet by mouth Daily. 90 tablet 2 Unknown    Cholecalciferol (Vitamin D3) 50 MCG (2000 UT) tablet Take 1 tablet by mouth Daily. 90 tablet 2 Unknown    Continuous Blood Gluc  (FreeStyle [place] 2 [place]) device    Unknown    Continuous Blood Gluc Sensor (FreeStyle Bethany 2 Sensor) misc USE every 14 DAYS 2 each 11 Unknown    dapagliflozin (Farxiga) 5 MG tablet tablet Take 1 tablet by mouth Daily. 90 tablet 2 Unknown    exenatide er (Bydureon BCise) 2 MG/0.85ML auto-injector injection Inject 0.85 mL under the skin into the appropriate area as directed 1 (One) Time Per Week.   Unknown    famotidine (PEPCID) 40 MG tablet Take 1 tablet by mouth Every Morning. 90 tablet 2 Unknown    ferrous gluconate (FERGON) 324 MG tablet Take 1 tablet by mouth Daily With Breakfast. 90 tablet 2 Unknown    Fluticasone-Salmeterol (ADVAIR/WIXELA) 500-50 MCG/ACT DISKUS Inhale 1 puff 2 (Two) Times a Day.   Unknown    glucose blood test strip 1 each by Other route Daily. Dx:   E11.40 100 each 4 Unknown    Insulin Lispro, 1 Unit Dial, (HUMALOG) 100 UNIT/ML solution pen-injector inject EIGHT units UNDER THE SKIN INTO THE APPROPRIATE AREA THREE TIMES DAILY PER sliding scale, IF BLOOD SUGAR < 160= 0 units, 161-220= 2 units, 221-280= 4 units, 281-340 = SIX units, 341-400 = EIGHT units 15 mL 1 Unknown    Lantus SoloStar 100 UNIT/ML injection pen INJECT 40 UNITS UNDER THE SKIN ONCE DAILY 15 mL 1 Unknown    losartan (COZAAR) 25 MG  tablet Take 1 tablet by mouth Every Morning. To be discontinued by MD at [facility]   Unknown    magnesium oxide (MAG-OX) 400 tablet tablet Take 1 tablet by mouth Daily. Started by Mariano   Unknown    NIFEdipine XL (PROCARDIA XL) 30 MG 24 hr tablet Take 1 tablet by mouth Every Morning. (Patient taking differently: Take 1 tablet by mouth Every Morning. To be discontinued by MD at [facility]) 90 tablet 2 Unknown    O2 (OXYGEN) 2 Liter O2 - CONTINUOUS (route: Oxygen)       ondansetron ODT (ZOFRAN-ODT) 4 MG disintegrating tablet PLACE 1 TABLET ON THE TONGUE EVERY 8 HOURS AS NEEDED FOR NAUSEA AND VOMITING 10 tablet 0 Unknown    Semaglutide, 1 MG/DOSE, (Ozempic, 1 MG/DOSE,) 4 MG/3ML solution pen-injector Inject 1 mg under the skin into the appropriate area as directed 1 (One) Time Per Week. 3 mL 5 Unknown    TRUEplus Lancets 28G misc USE AS DIRECTED 100 each 0 Unknown    Ventolin  (90 Base) MCG/ACT inhaler INHALE 2 PUFFS FOUR TIMES DAILY AS NEEDED FOR WHEEZING 18 g 11      Allergies:    Allergies   Allergen Reactions    Benadryl [Diphenhydramine] Itching    Proventil [Albuterol] Other (See Comments)     Mouth sores         Objective   Objective     Vital Signs  Temp:  [97.9 °F (36.6 °C)-98.2 °F (36.8 °C)] 98.2 °F (36.8 °C)  Heart Rate:  [100] 100  Resp:  [18] 18  BP: (134-136)/(58-67) 136/67  SpO2:  [96 %-99 %] 99 %  on  Flow (L/min):  [4] 4;   Device (Oxygen Therapy): nasal cannula  Body mass index is 41.02 kg/m².    Physical Exam  Vitals and nursing note reviewed.   Constitutional:       Appearance: He is obese.   HENT:      Head: Normocephalic and atraumatic.      Nose: Nose normal.      Mouth/Throat:      Mouth: Mucous membranes are moist.      Pharynx: Oropharynx is clear.   Eyes:      Extraocular Movements: Extraocular movements intact.      Conjunctiva/sclera: Conjunctivae normal.   Cardiovascular:      Rate and Rhythm: Normal rate and regular rhythm.      Pulses: Normal pulses.      Heart sounds: Normal heart  sounds.   Pulmonary:      Effort: Pulmonary effort is normal.      Breath sounds: Examination of the right-middle field reveals rhonchi. Examination of the left-middle field reveals rhonchi. Examination of the right-lower field reveals wheezing and rhonchi. Examination of the left-lower field reveals wheezing and rhonchi.   Abdominal:      General: Bowel sounds are normal.      Palpations: Abdomen is soft.   Musculoskeletal:         General: Normal range of motion.      Cervical back: Normal range of motion and neck supple.      Right lower leg: No edema.      Left lower leg: No edema.   Skin:     General: Skin is warm and dry.   Neurological:      General: No focal deficit present.      Mental Status: He is alert and oriented to person, place, and time.   Psychiatric:         Mood and Affect: Mood normal.         Behavior: Behavior normal.         Results Review:  I reviewed the patient's new clinical results.  I reviewed the patient's new imaging results and agree with the interpretation.  I reviewed the patient's other test results and agree with the interpretation  I personally viewed and interpreted the patient's EKG/Telemetry data  Discussed with ED provider.    Lab Results (last 24 hours)       Procedure Component Value Units Date/Time    Glucose, Nova Meter [[id number]]  (Abnormal) Collected: 12/06/23 0740     Updated: 12/06/23 1902    Glucose, Nova Meter [[id number]]  (Abnormal) Collected: 12/06/23 1136     Updated: 12/06/23 1902    Glucose, Nova Meter [[id number]]  (Abnormal) Collected: 12/06/23 1613     Updated: 12/06/23 1902    CBC & Differential [[id number]]  (Abnormal) Collected: 12/06/23 2132    Specimen: Blood Updated: 12/06/23 2151    Narrative:      The following orders were created for panel order CBC & Differential.  Procedure                               Abnormality         Status                     ---------                               -----------         ------                     CBC Auto  "Differential[[id number]]        Abnormal            Final result                 Please view results for these tests on the individual orders.    Comprehensive Metabolic Panel [[id number]]  (Abnormal) Collected: 12/06/23 2132    Specimen: Blood Updated: 12/06/23 2208     Glucose 151 mg/dL      BUN 48 mg/dL      Creatinine 3.09 mg/dL      Sodium 138 mmol/L      Potassium 4.0 mmol/L      Chloride 101 mmol/L      CO2 24.2 mmol/L      Calcium 8.5 mg/dL      Total Protein 6.6 g/dL      Albumin 2.8 g/dL      ALT (SGPT) 22 U/L      AST (SGOT) 17 U/L      Alkaline Phosphatase 124 U/L      Total Bilirubin 0.3 mg/dL      Globulin 3.8 gm/dL      A/G Ratio 0.7 g/dL      BUN/Creatinine Ratio 15.5     Anion Gap 12.8 mmol/L      eGFR 22.5 mL/min/1.73     Narrative:      GFR Normal >60  Chronic Kidney Disease <60  Kidney Failure <15      Protime-INR [[id number]]  (Abnormal) Collected: 12/06/23 2132    Specimen: Blood Updated: 12/06/23 2203     Protime 16.8 Seconds      INR 1.34    Procalcitonin [[id number]]  (Abnormal) Collected: 12/06/23 2132    Specimen: Blood Updated: 12/06/23 2215     Procalcitonin 1.33 ng/mL     Narrative:      As a Marker for Sepsis (Non-Neonates):    1. <0.5 ng/mL represents a low risk of severe sepsis and/or septic shock.  2. >2 ng/mL represents a high risk of severe sepsis and/or septic shock.    As a Marker for Lower Respiratory Tract Infections that require antibiotic therapy:    PCT on Admission    Antibiotic Therapy       6-12 Hrs later    >0.5                Strongly Recommended  >0.25 - <0.5        Recommended   0.1 - 0.25          Discouraged              Remeasure/reassess PCT  <0.1                Strongly Discouraged     Remeasure/reassess PCT    As 28 day mortality risk marker: \"Change in Procalcitonin Result\" (>80% or <=80%) if Day 0 (or Day 1) and Day 4 values are available. Refer to http://www.[employer]-pct-calculator.com    Change in PCT <=80%  A decrease of PCT levels below or equal to 80% defines " a positive change in PCT test result representing a higher risk for 28-day all-cause mortality of patients diagnosed with severe sepsis for septic shock.    Change in PCT >80%  A decrease of PCT levels of more than 80% defines a negative change in PCT result representing a lower risk for 28-day all-cause mortality of patients diagnosed with severe sepsis or septic shock.       Lactic Acid, Plasma [[id number]]  (Normal) Collected: 12/06/23 2132    Specimen: Blood Updated: 12/06/23 2232     Lactate 1.3 mmol/L     CBC Auto Differential [[id number]]  (Abnormal) Collected: 12/06/23 2132    Specimen: Blood Updated: 12/06/23 2151     WBC 24.82 10*3/mm3      RBC 2.70 10*6/mm3      Hemoglobin 7.7 g/dL      Hematocrit 23.3 %      MCV 86.3 fL      MCH 28.5 pg      MCHC 33.0 g/dL      RDW 13.0 %      RDW-SD 39.9 fl      MPV 9.0 fL      Platelets 435 10*3/mm3      Neutrophil % 79.2 %      Lymphocyte % 10.5 %      Monocyte % 6.4 %      Eosinophil % 1.7 %      Basophil % 0.1 %      Immature Grans % 2.1 %      Neutrophils, Absolute 19.67 10*3/mm3      Lymphocytes, Absolute 2.61 10*3/mm3      Monocytes, Absolute 1.58 10*3/mm3      Eosinophils, Absolute 0.42 10*3/mm3      Basophils, Absolute 0.03 10*3/mm3      Immature Grans, Absolute 0.51 10*3/mm3      nRBC 0.2 /100 WBC     POC Glucose Once [[id number]]  (Abnormal) Collected: 12/06/23 2145    Specimen: Blood Updated: 12/06/23 2146     Glucose 192 mg/dL     Creatinine Urine Random (kidney function) GFR component - Indwelling Urethral Catheter [[id number]] Collected: 12/07/23 0148    Specimen: Urine from Indwelling Urethral Catheter Updated: 12/07/23 0539     Creatinine, Urine 35.1 mg/dL     Narrative:      Reference intervals for random urine have not been established.  Clinical usage is dependent upon physician's interpretation in combination with other laboratory tests.       Sodium, Urine, Random - Indwelling Urethral Catheter [[id number]] Collected: 12/07/23 0148    Specimen: Urine  from Indwelling Urethral Catheter Updated: 12/07/23 0542     Sodium, Urine 47 mmol/L     Narrative:      Reference intervals for random urine have not been established.  Clinical usage is dependent upon physician's interpretation in combination with other laboratory tests.       POC Glucose Once [[id number]]  (Abnormal) Collected: 12/07/23 0646    Specimen: Blood Updated: 12/07/23 0648     Glucose 381 mg/dL             Imaging Results (Last 24 Hours)       Procedure Component Value Units Date/Time    XR Chest 1 View [[id number]] Collected: 12/07/23 0024     Updated: 12/07/23 0032    Narrative:      XR CHEST 1 VW-     INDICATION: Port placement     COMPARISON: 5/18/2023       Impression:      Unchanged right subclavian port with tip overlying the mid  SVC. Overlapping central venous tubing extending from the left  subclavian vein to the mid SVC, possibly abandoned. Patient is rotated.  Unchanged at least partial right upper lobe atelectasis and bilateral,  right greater than left parenchymal opacities. No pleural effusion or  pneumothorax.     This report was finalized on 12/7/2023 12:29 AM by Dr. Pawan Mcallister M.D on Workstation: [id number]               Results for orders placed during the hospital encounter of 04/05/22    Adult Transthoracic Echo Limited W/ Cont if Necessary Per Protocol    Interpretation Summary  · The aortic root measures 3.1 cm.  · Left ventricular ejection fraction appears to be 56 - 60%.  · Left ventricular diastolic function is consistent with (grade I) impaired relaxation.    There were no apparent intracardiac masses, vegetations or thrombi.      SCANNED - TELEMETRY     Final Result      SCANNED - TELEMETRY     Final Result      SCANNED - TELEMETRY     Final Result           Assessment/Plan     Active Hospital Problems    Diagnosis  POA    **Pneumonia [J18.9]  Unknown    Bronchiectasis [J47.9]  Unknown    Sepsis [A41.9]  Unknown    Essential hypertension [I10]  Yes     not at goal, to  check BP at home and call if BP > 140/90      Chronic obstructive pulmonary disease [J44.9]  Yes     stable, cont current pulm meds, f/u Dr Chavez as directed      Stage 3b chronic kidney disease [N18.32]  Yes     chronic, f/u Dr Martínez as directed      Neurogenic bladder [N31.9]  Yes    Hyperlipidemia [E78.5]  Yes    Paroxysmal atrial fibrillation [I48.0]  Yes    Obstructive sleep apnea [G47.33]  Yes    Chronic diastolic heart failure [I50.32]  Yes    ERIC (acute kidney injury) [N17.9]  Yes       Sepsis secondary to Pneumonia  -His WBC is 24.82 and procal is 1.33  -MRSA nares screen was positive  -Sputum cultures grew out achromobacter xylosoxidans on 12/3/2023  -He was initially treated with Vancomycin and Cefepime. Once cultures resulted, he was started on Bactrim and Zosyn  -X-ray showed worsening pneumonia  -Continue Zyvox for now  -Infectious disease consult  -IS    ERIC on CKD Stage 3b  -His baseline creatinine appears to be around 1.8  -His creatinine when he presented to the [facility] was 2. His diuretics and ARBs were initially placed on hold but then he developed worsening respiratory symptoms and required BiPAP. He was started on Bumex 2 mg IV and his creatinine is currently 3.09  -Nephrology consult  -Repeat labs in AM    COPD/Chronic Cystic Bronchiectasis with Acute Exacerbation  -He is currently requiring supplemental oxygen at 4L NC. He wears 2L NC at home. Continue oxygen to keep sats greater than or equal to 90%  -Scheduled and PRN albuterol  -Pulmonology consult    Abdominal Wall Cellulitis  -He is s/p I&D of a small abscess of his pannus by general surgery  -He had some bleeding post procedure and his Eliquis was stopped  -Cellulitis has resolved  -WOCN consult    Hypertension  -Blood pressures stable. Continue Metoprolol  -Monitor    Paroxysmal Atrial Fibrillation  -Rate controlled  -Check EKG in AM    Chronic Diastolic CHF  -He was on Bumex at the [facility]  -Hold off on diuretics  for now due to his kidney function  -He may require a cardiology consult    Type 2 Diabetes Mellitus  -Hold oral diabetic medications  -Initiate correctional factor insulin  -Continue basal insulin at an attenuated dose  -Hgb A1C 8.7 on 11/10/23    Neurogenic Bladder  -An indwelling F/C was placed at [facility]  -Continue F/C care    Morbid Obesity  -Complicating all of the above    Bedbug Infestation      -I discussed the patients findings and my recommendations with patient.    VTE Prophylaxis - SCDs.  Code Status - Full code.       CON Carnes  [facility]  12/07/23  01:15 EST      Electronically signed by Iam Eller MD at 12/07/23 0935       Emergency Department Notes    No notes of this type exist for this encounter.       Oxygen Therapy (since admission)       Date/Time SpO2 Device (Oxygen Therapy) Flow (L/min) Oxygen Concentration (%) ETCO2 (mmHg)    12/08/23 1033 95 nasal cannula 2 -- --    12/08/23 0717 96 nasal cannula 2 -- --    12/08/23 0647 95 nasal cannula 2 -- --    12/08/23 0405 100 nasal cannula 2 -- --    12/08/23 0040 -- nasal cannula 2 -- --    12/07/23 2335 100 nasal cannula 2 -- --    12/07/23 2110 88 nasal cannula 2 -- --    12/07/23 2101 -- nasal cannula 2 -- --    12/07/23 2022 96 nasal cannula 2 -- --    12/07/23 1538 99 nasal cannula 2 -- --    12/07/23 1525 100 nasal cannula 1 -- --    12/07/23 1515 100 nasal cannula 1 -- --    12/07/23 1322 100 nasal cannula 2 -- --    12/07/23 1237 99 nasal cannula 2 -- --    12/07/23 1217 100 nasal cannula 2 -- --    12/07/23 1214 100 nasal cannula 2 -- --    12/07/23 1207 100 nasal cannula 3 -- --    12/07/23 0918 100 nasal cannula 3 -- --    12/07/23 0912 98 nasal cannula 3 -- --    12/07/23 0747 97 nasal cannula 4 -- --    12/07/23 0036 -- nasal cannula 4 -- --    12/06/23 2334 99 nasal cannula -- -- --    12/06/23 2100 -- nasal cannula 4 -- --    12/06/23 1904 96 nasal cannula -- -- --          Intake & Output  "(last 3 days)         12/05 0701 12/06 0700 12/06 0701 12/07 0700 12/07 0701 12/08 0700 12/08 0701 12/09 0700    P.O.   720 240    Blood   315.2     Total Intake(mL/kg)   1035.2 (8.2) 240 (1.9)    Urine (mL/kg/hr)  1450 4600 (1.5) 450 (0.9)    Stool  0 0 0    Total Output  1450 4600 450    Net  -1450 -3564.8 -210            Stool Unmeasured Occurrence  1 x 2 x 1 x           Lines, Drains & Airways       Active LDAs       Name Placement date Placement time Site Days    Urethral Catheter --  --  -- --    Single Lumen Implantable Port Right Subclavian --  --  Subclavian  --              Inactive LDAs       None                  Medication Administration Report for Preston Wallis \"Gómez\" as of 12/08/23 1107     Legend:    Given Hold Not Given Due Canceled Entry Other Actions    Time Time (Time) Time Time-Action         Discontinued     Completed     Future     MAR Hold     Linked             Medications 12/06/23 12/07/23 12/08/23      acetaminophen (TYLENOL) tablet 650 mg  Dose: 650 mg  Freq: Every 4 Hours PRN Route: PO  PRN Reason: Mild Pain  Start: 12/06/23 2034   Admin Instructions:   If given for fever, use fever parameter: fever greater than 100.4 °F  Based on patient request - if ordered for moderate or severe pain, provider allows for administration of a medication prescribed for a lower pain scale.    Do not exceed 4 grams of acetaminophen in a 24 hr period. Max dose of 2gm for AST/ALT greater than 120 units/L.    If given for pain, use the following pain scale:   Mild Pain = Pain Score of 1-3, CPOT 1-2  Moderate Pain = Pain Score of 4-6, CPOT 3-4  Severe Pain = Pain Score of 7-10, CPOT 5-8          albuterol (PROVENTIL) nebulizer solution 0.083% 2.5 mg/3mL  Dose: 2.5 mg  Freq: Every 6 Hours PRN Route: NEBULIZATION  PRN Reason: Shortness of Air  Start: 12/07/23 0651   Admin Instructions:   Include Respiratory Treatment Education          aluminum-magnesium hydroxide-simethicone (MAALOX MAX) 400-400-40 MG/5ML " suspension 15 mL  Dose: 15 mL  Freq: Every 6 Hours PRN Route: PO  PRN Reason: Heartburn  Start: 12/06/23 2035   Admin Instructions:   Maximum 60 mL in 24 hours.          atorvastatin (LIPITOR) tablet 20 mg  Dose: 20 mg  Freq: Nightly Route: PO  Start: 12/07/23 0315   Admin Instructions:   Avoid grapefruit juice.     (0244)-Not Given [C]     2100-Given         2100             sennosides-docusate (PERICOLACE) 8.6-50 MG per tablet 2 tablet  Dose: 2 tablet  Freq: 2 Times Daily Route: PO  Start: 12/06/23 2130   Admin Instructions:   HOLD MEDICATION IF PATIENT HAS HAD BOWEL MOVEMENT. Start bowel management regimen if patient has not had a bowel movement after 12 hours.    (2208)-Not Given          (0813)-Not Given     (2022)-Not Given         (0815)-Not Given     2100           And  polyethylene glycol (MIRALAX) packet 17 g  Dose: 17 g  Freq: Daily PRN Route: PO  PRN Reason: Constipation  PRN Comment: Use if senna-docusate is ineffective  Start: 12/06/23 2034   Admin Instructions:   Use if no bowel movement after 12 hours. Mix in 6-8 ounces of water.  Use 4-8 ounces of water, tea, or juice for each 17 gram dose.         And  bisacodyl (DULCOLAX) EC tablet 5 mg  Dose: 5 mg  Freq: Daily PRN Route: PO  PRN Reason: Constipation  PRN Comment: Use if polyethylene glycol is ineffective  Start: 12/06/23 2034   Admin Instructions:   Use if no bowel movement after 12 hours.  Swallow whole. Do not crush, split, or chew tablet.         And  bisacodyl (DULCOLAX) suppository 10 mg  Dose: 10 mg  Freq: Daily PRN Route: RE  PRN Reason: Constipation  PRN Comment: Use if bisacodyl oral is ineffective  Start: 12/06/23 2034   Admin Instructions:   Use if no bowel movement after 12 hours.  Hold for diarrhea          budesonide-formoterol (SYMBICORT) 160-4.5 MCG/ACT inhaler 2 puff  Dose: 2 puff  Freq: 2 Times Daily - RT Route: IN  Start: 12/07/23 0315   Admin Instructions:    Shake well.  Rinse mouth after use, do not swallow water.  Send  aerosols to pharmacy in ziplock bag for proper disposal.     (1450)-Not Given [C]     0912-Given     2109-Given        0647-Given     2130            dextrose (D50W) (25 g/50 mL) IV injection 25 g  Dose: 25 g  Freq: Every 15 Minutes PRN Route: IV  PRN Reason: Low Blood Sugar  PRN Comment: Blood Sugar Less Than 70  Start: 12/06/23 2037   Admin Instructions:   Blood sugar less than 70; patient has IV access - Unresponsive, NPO or Unable To Safely Swallow          dextrose (GLUTOSE) oral gel 15 g  Dose: 15 g  Freq: Every 15 Minutes PRN Route: PO  PRN Reason: Low Blood Sugar  PRN Comment: Blood sugar less than 70  Start: 12/06/23 2037   Admin Instructions:   BS<70, Patient Alert, Is not NPO, Can safely swallow.          docusate sodium (COLACE) capsule 100 mg  Dose: 100 mg  Freq: Daily Route: PO  Start: 12/07/23 0900   Admin Instructions:   Swallow whole.  Do not open, crush, or chew capsule.     (0812)-Not Given          (0815)-Not Given             famotidine (PEPCID) tablet 40 mg  Dose: 40 mg  Freq: Every Morning Route: PO  Start: 12/07/23 0700     0636-Given          0655-Given             finasteride (PROSCAR) tablet 5 mg  Dose: 5 mg  Freq: Daily Route: PO  Start: 12/07/23 0900   Admin Instructions:   Do not crush or chew the capsules or tablets. Contact Pharmacy if needed.  Group 2 ([place]) Hazardous Drug - Reproductive Risk Only - See Handling Guide     0812-Given          0815-Given             gabapentin (NEURONTIN) capsule 300 mg  Dose: 300 mg  Freq: Every 8 Hours Scheduled Route: PO  Start: 12/07/23 0600   Admin Instructions:        0636-Given     1352-Given     2305-Given        0655-Given     1400     2200           glucagon (GLUCAGEN) injection 1 mg  Dose: 1 mg  Freq: Every 15 Minutes PRN Route: IM  PRN Reason: Low Blood Sugar  PRN Comment: Blood Glucose Less Than 70  Start: 12/06/23 2037   Admin Instructions:   Blood Glucose Less Than 70 - Patient Without IV Access - Unresponsive, NPO or Unable To Safely  Swallow  Reconstitute powder for injection by adding 1 mL of -supplied sterile diluent or sterile water for injection to a vial containing 1 mg of the drug, to provide solutions containing 1 mg/mL. Shake vial gently to dissolve.          hydrocortisone-bacitracin-zinc oxide-nystatin (MAGIC BARRIER) ointment 1 application   Dose: 1 application   Freq: Every 12 Hours Scheduled Route: TOP  Start: 12/07/23 2100   Admin Instructions:   For topical use only     2101-Given          0815-Given     2100            insulin glargine (LANTUS, SEMGLEE) injection 30 Units  Dose: 30 Units  Freq: Nightly Route: SC  Start: 12/07/23 2100   Admin Instructions:   Do not hold basal insulin without an order. Consider requesting a dose edit, if needed.       2101-Given          2100             insulin lispro (HUMALOG/ADMELOG) injection 3-14 Units  Dose: 3-14 Units  Freq: 4 Times Daily Before Meals & Nightly Route: SC  Start: 12/07/23 1245   Admin Instructions:   Correction Insulin - Moderate-High Dose (Total Insulin Dose 60-80 units/day, Patient Taking Insulin at Home)    Blood Glucose 150-199 mg/dL - 3 units  Blood Glucose 200-249 mg/dL - 5 units  Blood Glucose 250-299 mg/dL - 8 units  Blood Glucose 300-349 mg/dL - 10 units  Blood Glucose 350-400 mg/dL - 12 units  Blood Glucose Greater Than 400 mg/dL - 14 units & Call Provider   Caution: Look alike/sound alike drug alert     1153-Given     1701-Given     2101-Given        (0656)-Not Given [C]     1130     1730       2100             ipratropium-albuterol (DUO-NEB) nebulizer solution 3 mL  Dose: 3 mL  Freq: Every 4 Hours - RT Route: NEBULIZATION  Start: 12/07/23 1130   Admin Instructions:   Include Respiratory Treatment Education     1206-Given     1514-Given     2109-Given        0006-Given     0405-Given     0645-Given       1033-Given     1530     1930       2330             melatonin tablet 5 mg  Dose: 5 mg  Freq: Nightly PRN Route: PO  PRN Reason: Sleep  Start: 12/06/23  2034          metoprolol tartrate (LOPRESSOR) tablet 100 mg  Dose: 100 mg  Freq: 2 Times Daily Route: PO  Start: 12/07/23 0900   Admin Instructions:   Hold for SBP less than 100, DBP less than 60, or heart rate less than 50     0812-Given     2100-Given         0814-Given     2100            montelukast (SINGULAIR) tablet 10 mg  Dose: 10 mg  Freq: Daily Route: PO  Start: 12/07/23 0900     0812-Given          0815-Given             mupirocin (BACTROBAN) 2 % ointment 1 application   Dose: 1 application   Freq: Every 12 Hours Scheduled Route: TOP  Start: 12/07/23 2100   Admin Instructions:   Apply to rt medial abdomen        2101-Given          0815-Given     2100            nitroglycerin (NITROSTAT) SL tablet 0.4 mg  Dose: 0.4 mg  Freq: Every 5 Minutes PRN Route: SL  PRN Reason: Chest Pain  PRN Comment: Only if SBP Greater Than 100  Start: 12/06/23 2034   Admin Instructions:   If Pain Unrelieved After 3 Doses Notify MD  May administer up to 3 doses per episode.          ondansetron (ZOFRAN) tablet 4 mg  Dose: 4 mg  Freq: Every 6 Hours PRN Route: PO  PRN Reasons: Nausea,Vomiting  Start: 12/06/23 2035   Admin Instructions:   If BOTH ondansetron (ZOFRAN) and promethazine (PHENERGAN) are ordered use ondansetron first and THEN promethazine IF ondansetron is ineffective.         Or  ondansetron (ZOFRAN) injection 4 mg  Dose: 4 mg  Freq: Every 6 Hours PRN Route: IV  PRN Reasons: Nausea,Vomiting  Start: 12/06/23 2035   Admin Instructions:   If BOTH ondansetron (ZOFRAN) and promethazine (PHENERGAN) are ordered use ondansetron first and THEN promethazine IF ondansetron is ineffective.          piperacillin-tazobactam (ZOSYN) 4.5 g in iso-osmotic dextrose 100 mL IVPB (premix)  Dose: 4.5 g  Freq: Every 8 Hours Route: IV  Indications of Use: PNEUMONIA  Start: 12/07/23 1545   End: 12/14/23 1544   Admin Instructions:   Refrigerate     1619-New Bag     2305-New Bag         0815-New Bag     1545     2345           sodium chloride 0.9  % flush 10 mL  Dose: 10 mL  Freq: As Needed Route: IV  PRN Reason: Line Care  Start: 12/06/23 2034          sodium chloride 7 % nebulizer solution nebulizer solution 4 mL  Dose: 4 mL  Freq: 2 Times Daily - RT Route: NEBULIZATION  Start: 12/07/23 1115   Admin Instructions:   CAUTION:  Hypertonic     (1206)-Not Given [C]     2109-Given         0646-Given     2130           Completed Medications  Medications 12/06/23 12/07/23 12/08/23       acetaminophen (TYLENOL) tablet 650 mg  Dose: 650 mg  Freq: Once Route: PO  Start: 12/07/23 1000   End: 12/07/23 1152   Admin Instructions:   Give Prior to First Transfusion  Based on patient request - if ordered for moderate or severe pain, provider allows for administration of a medication prescribed for a lower pain scale.    Do not exceed 4 grams of acetaminophen in a 24 hr period. Max dose of 2gm for AST/ALT greater than 120 units/L.    If given for pain, use the following pain scale:   Mild Pain = Pain Score of 1-3, CPOT 1-2  Moderate Pain = Pain Score of 4-6, CPOT 3-4  Severe Pain = Pain Score of 7-10, CPOT 5-8     1152-Given [C]             Or  acetaminophen (TYLENOL) 160 MG/5ML oral solution 650 mg  Dose: 650 mg  Freq: Once Route: PO  Start: 12/07/23 1000   End: 12/07/23 1152   Admin Instructions:   Give Prior to First Transfusion  Based on patient request - if ordered for moderate or severe pain, provider allows for administration of a medication prescribed for a lower pain scale.    Do not exceed 4 grams of acetaminophen in a 24 hr period. Max dose of 2gm for AST/ALT greater than 120 units/L.    If given for pain, use the following pain scale:   Mild Pain = Pain Score of 1-3, CPOT 1-2  Moderate Pain = Pain Score of 4-6, CPOT 3-4  Severe Pain = Pain Score of 7-10, CPOT 5-8     1152-Not Given:  See Alt             Or  acetaminophen (TYLENOL) suppository 650 mg  Dose: 650 mg  Freq: Once Route: RE  Start: 12/07/23 1000   End: 12/07/23 1152   Admin Instructions:   Give Prior to  First Transfusion  Based on patient request - if ordered for moderate or severe pain, provider allows for administration of a medication prescribed for a lower pain scale.    Do not exceed 4 grams of acetaminophen in a 24 hr period. Max dose of 2gm for AST/ALT greater than 120 units/L.    If given for pain, use the following pain scale:   Mild Pain = Pain Score of 1-3, CPOT 1-2  Moderate Pain = Pain Score of 4-6, CPOT 3-4  Severe Pain = Pain Score of 7-10, CPOT 5-8     1152-Not Given:  See Alt              piperacillin-tazobactam (ZOSYN) 4.5 g in iso-osmotic dextrose 100 mL IVPB (premix)  Dose: 4.5 g  Freq: Once Route: IV  Start: 12/07/23 0945   End: 12/07/23 1025   Admin Instructions:   Refrigerate     0955-New Bag             Discontinued Medications  Medications 12/06/23 12/07/23 12/08/23       albuterol (PROVENTIL) nebulizer solution 0.083% 2.5 mg/3mL  Dose: 2.5 mg  Freq: Every 6 Hours - RT Route: NEBULIZATION  Start: 12/07/23 0745   End: 12/07/23 0740   Admin Instructions:   Include Respiratory Treatment Education          insulin lispro (HUMALOG/ADMELOG) injection 2-9 Units  Dose: 2-9 Units  Freq: 4 Times Daily Before Meals & Nightly Route: SC  Start: 12/06/23 2130   End: 12/07/23 1140   Admin Instructions:   Correction Insulin - Moderate Dose (Total Insulin Dose 40-60 units/day, Average Weight Patient, Patient Taking Oral Hypoglycemic)    Blood Glucose 150-199 mg/dL - 2 units  Blood Glucose 200-249 mg/dL - 4 units  Blood Glucose 250-299 mg/dL - 6 units  Blood Glucose 300-349 mg/dL - 7 units  Blood Glucose 350-400 mg/dL - 8 units  Blood Glucose greater than 400 mg/dL - 9 units & Call Provider   Caution: Look alike/sound alike drug alert    (2132)-Not Given          0812-Given     1145-Canceled Entry             ipratropium-albuterol (DUO-NEB) nebulizer solution 3 mL  Dose: 3 mL  Freq: 4 Times Daily - RT Route: NEBULIZATION  Start: 12/07/23 0830   End: 12/07/23 1016   Admin Instructions:   Include Respiratory  Treatment Education     0913-Given              Linezolid (ZYVOX) 600 mg 300 mL  Dose: 600 mg  Freq: Every 12 Hours Route: IV  Indications of Use: PNEUMONIA  Start: 235   End: 23 0850   Admin Instructions:   Protect from light. Do NOT refrigerate.   Order specific questions:   Reason for Therapy Empiric MRSA pneumonia       0133-New Bag                         Operative/Procedure Notes (all)    No notes of this type exist for this encounter.          Physician Progress Notes (all)        Buddy Ignacio MD at 23 1013              [facility] Progress Note      Patient Name: Preston Wallis  : 1965  MRN: [id number]  Primary Care Physician:  Kimmy Riley MD  Date of admission: 2023    Subjective     Interval History:   Follow-up acute kidney injury on chronic kidney disease    Patient is feeling the same, denies any chest pain or shortness of air, no orthopnea or PND, no nausea or vomiting, no abdominal pain, he has Doyle catheter anchored in place, urine output yesterday was 4600 cc    Review of Systems:   As noted above    Objective     Vitals:   Temp:  [97.3 °F (36.3 °C)-98.4 °F (36.9 °C)] 97.7 °F (36.5 °C)  Heart Rate:  [78-92] 84  Resp:  [16-20] 20  BP: (106-149)/(60-83) 130/60  Flow (L/min):  [1-3] 2    Intake/Output Summary (Last 24 hours) at 2023 1013  Last data filed at 2023 0900  Gross per 24 hour   Intake 1035.17 ml   Output 3850 ml   Net -2814.83 ml       Physical Exam:    General Appearance: alert, awake, chronically ill, obese, no acute distress   Skin: warm and dry  HEENT: oral mucosa normal, nonicteric sclera  Neck: supple, no JVD  Lungs: Scattered rhonchi, breathing effort not labored  Heart: RRR, normal S1 and S2, no S3, no rub  Abdomen: soft, nontender, nondistended, normoactive bowel  : Doyle catheter anchored in place  Extremities: 1+ pedal and pretibial edema.  Neuro: normal speech and mental status     Scheduled  Meds:     atorvastatin, 20 mg, Oral, Nightly  budesonide-formoterol, 2 puff, Inhalation, BID - RT  docusate sodium, 100 mg, Oral, Daily  famotidine, 40 mg, Oral, QAM  finasteride, 5 mg, Oral, Daily  gabapentin, 300 mg, Oral, Q8H  hydrocortisone-bacitracin-zinc oxide-nystatin, 1 application , Topical, Q12H  insulin glargine, 30 Units, Subcutaneous, Nightly  insulin lispro, 3-14 Units, Subcutaneous, 4x Daily AC & at Bedtime  ipratropium-albuterol, 3 mL, Nebulization, Q4H - RT  metoprolol tartrate, 100 mg, Oral, BID  montelukast, 10 mg, Oral, Daily  mupirocin, 1 application , Topical, Q12H  piperacillin-tazobactam, 4.5 g, Intravenous, Q8H  senna-docusate sodium, 2 tablet, Oral, BID  sodium chloride, 4 mL, Nebulization, BID - RT      IV Meds:        Results Reviewed:   I have personally reviewed the results from the time of this admission to 12/8/2023 10:13 EST     Results from last 7 days   Lab Units 12/08/23  0657 12/07/23  0833 12/06/23  2132   SODIUM mmol/L 141 134* 138   POTASSIUM mmol/L 4.2 4.3 4.0   CHLORIDE mmol/L 102 100 101   CO2 mmol/L 27.0 21.7* 24.2   BUN mg/dL 35* 46* 48*   CREATININE mg/dL 2.71* 2.79* 3.09*   CALCIUM mg/dL 9.0 8.4* 8.5*   BILIRUBIN mg/dL 0.2  --  0.3   ALK PHOS U/L 130*  --  124*   ALT (SGPT) U/L 27  --  22   AST (SGOT) U/L 23  --  17   GLUCOSE mg/dL 167* 331* 151*       Estimated Creatinine Clearance: 39 mL/min (A) (by C-G formula based on SCr of 2.71 mg/dL (H)).    Results from last 7 days   Lab Units 12/08/23  0657   MAGNESIUM mg/dL 2.4   PHOSPHORUS mg/dL 3.9       Results from last 7 days   Lab Units 12/08/23  0657   URIC ACID mg/dL 5.7       Results from last 7 days   Lab Units 12/08/23  0657 12/07/23  0833 12/06/23  2132   WBC 10*3/mm3 20.64* 21.60* 24.82*   HEMOGLOBIN g/dL 8.3* 6.9* 7.7*   PLATELETS 10*3/mm3 466* 474* 435       Results from last 7 days   Lab Units 12/06/23  2132   INR  1.34*       Assessment / Plan     ASSESSMENT:  Acute kidney injury on chronic kidney disease,  multifactorial in etiology, creatinine slightly better today than on admission, creatinine today 2.71, very stable, electrolyte within acceptable range  Chronic kidney disease stage III secondary to diabetic nephropathy baseline creatinine 1.6-1.8, patient has a proteinuria his protein to creatinine ratio was 2542 mg/g on 12/7/2023.  6 bronchiectasis pneumonia being evaluated by ID  Diabetes mellitus type 2 with renal complication  Diabetic neuropathy  Neurogenic bladder quiring self-catheterization currently has Doyle catheter anchored in  Severe anemia, hemoglobin up to 8.3 after transfusion  Morbid obesity  Hypertension with chronic kidney disease, reasonably controlled  Volume excess improving    PLAN:  Continue the same treatment  Surveillance labs    I reviewed the chart and other providers notes, reviewed labs.  Discussed the case with the patient and he voiced good understanding  Copied text in this note has been reviewed and is accurate as of 12/08/23.         Thank you for involving us in the care of Preston Wallis.  Please feel free to call with any questions.    Buddy Ignacio MD  12/08/23  10:13 [facility]    [facility]  [phone]    Please note that portions of this note were completed with a voice recognition program.    Electronically signed by Buddy Ignacio MD at 12/08/23 1013       Byron Hinds DO at 12/08/23 0996           LOS: 2 days     Chief Complaint: Pneumonia    Interval History: Patient reports he is feeling better today.  States his shortness of breath is improving.  Cough is improved as well.  WBC down to 20.  On 2 L which is his chronic requirements.    Vital Signs  Temp:  [97.3 °F (36.3 °C)-98.4 °F (36.9 °C)] 97.7 °F (36.5 °C)  Heart Rate:  [78-96] 84  Resp:  [16-20] 20  BP: (106-149)/(60-83) 130/60    Physical Exam:  General: In no acute distress  HEENT: Oropharynx clear, moist mucous membranes  Respiratory: Normal work of breathing on 2  L  Skin: No rashes or lesions  Extremities: No edema, cyanosis  Access: Peripheral IV    Antibiotics:  Anti-Infectives (From admission, onward)      Ordered     Dose/Rate Route Frequency Start Stop    12/07/23 0850  piperacillin-tazobactam (ZOSYN) 4.5 g in iso-osmotic dextrose 100 mL IVPB (premix)        Ordering Provider: Byron Hinds DO    4.5 g  over 4 Hours Intravenous Every 8 Hours 12/07/23 1545 12/14/23 1544    12/07/23 0850  piperacillin-tazobactam (ZOSYN) 4.5 g in iso-osmotic dextrose 100 mL IVPB (premix)        Ordering Provider: Byron Hinds DO    4.5 g  over 30 Minutes Intravenous Once 12/07/23 0945 12/07/23 1025             Results Review:     I reviewed the patient's new clinical results.    Lab Results   Component Value Date    WBC 20.64 (H) 12/08/2023    HGB 8.3 (L) 12/08/2023    HCT 25.4 (L) 12/08/2023    MCV 87.3 12/08/2023     (H) 12/08/2023     Lab Results   Component Value Date    GLUCOSE 167 (H) 12/08/2023    BUN 35 (H) 12/08/2023    CREATININE 2.71 (H) 12/08/2023    EGFRIFNONA 46 (L) 07/27/2021    BCR 12.9 12/08/2023    CO2 27.0 12/08/2023    CALCIUM 9.0 12/08/2023    PROTENTOTREF 7.1 08/22/2022    ALBUMIN 3.1 (L) 12/08/2023    LABIL2 0.8 08/22/2022    AST 23 12/08/2023    ALT 27 12/08/2023       Microbiology:  Outside studies:  - Sputum culture Achromobacter xylosoxidans  -MRSA nares positive  -Abdominal wound culture no growth    12/7 Legionella urine antigen negative  12/7 strep pneumo urine antigen negative  12/7 MRSA nares negative  12/7 respiratory culture rejected  12/8 respiratory culture in process    Assessment    #Right lower lobe pneumonia  #ERIC on CKD  #Acute hypoxic respiratory failure  #Bronchiectasis with acute exacerbation  #Morbid obesity, BMI 41  #Type 2 diabetes    Plan to continue Zosyn 4.5 every 8 hours targeting Achromobacter.  Repeat culture initially rejected and now a new sample is pending.  Plan for 7 days of therapy for pneumonia through  .           Electronically signed by Byron Hinds DO at 23 0918       Nathan Richards MD at 23 0742              [facility] INPATIENT PROGRESS NOTE         [address]    2023      PATIENT IDENTIFICATION:  Name: Preston Wallis ADMIT: 2023   : 1965  PCP: Kimmy Riley MD    MRN: [id number] LOS: 2 days   AGE/SEX: 58 y.o. male  ROOM: [facility]                     LOS 2    Reason for visit: Pneumonia and COPD exacerbation      SUBJECTIVE:      Less short of breath.  On 2 L supplemental oxygen with saturations 99%.    Objective   OBJECTIVE:    Vital Sign Min/Max for last 24 hours  Temp  Min: 97.3 °F (36.3 °C)  Max: 98.4 °F (36.9 °C)   BP  Min: 106/69  Max: 149/83   Pulse  Min: 78  Max: 101   Resp  Min: 16  Max: 20   SpO2  Min: 88 %  Max: 100 %   No data recorded   No data recorded    Vitals:    23 0000 23 0405 23 0647 23 0717   BP:    130/60   BP Location:    Left arm   Patient Position:    Lying   Pulse: 83 78 84    Resp:   20 20   Temp:    97.7 °F (36.5 °C)   TempSrc:    Oral   SpO2:  100% 95%    Weight:       Height:                23  1904   Weight: 126 kg (277 lb 12.5 oz)       Body mass index is 41.02 kg/m².                          Body mass index is 41.02 kg/m².    Intake/Output Summary (Last 24 hours) at 2023 0742  Last data filed at 2023 0650  Gross per 24 hour   Intake 1035.17 ml   Output 4600 ml   Net -3564.83 ml         Exam:  GEN:  No distress, appears stated age  EYES:   PERRL, anicteric sclerae  ENT:    External ears/nose normal, OP clear  NECK:  No adenopathy, midline trachea  LUNGS: Normal chest on inspection, palpation and coarse right base and diminished breath sounds bilaterally on auscultation  CV:  Normal S1S2, without murmur  ABD:  Nontender, nondistended, no hepatosplenomegaly, +BS  EXT:  No edema.  No cyanosis or clubbing.  No mottling and normal cap refill.    Assessment     Scheduled  meds:  atorvastatin, 20 mg, Oral, Nightly  budesonide-formoterol, 2 puff, Inhalation, BID - RT  docusate sodium, 100 mg, Oral, Daily  famotidine, 40 mg, Oral, QAM  finasteride, 5 mg, Oral, Daily  gabapentin, 300 mg, Oral, Q8H  hydrocortisone-bacitracin-zinc oxide-nystatin, 1 application , Topical, Q12H  insulin glargine, 30 Units, Subcutaneous, Nightly  insulin lispro, 3-14 Units, Subcutaneous, 4x Daily AC & at Bedtime  ipratropium-albuterol, 3 mL, Nebulization, Q4H - RT  metoprolol tartrate, 100 mg, Oral, BID  montelukast, 10 mg, Oral, Daily  mupirocin, 1 application , Topical, Q12H  piperacillin-tazobactam, 4.5 g, Intravenous, Q8H  senna-docusate sodium, 2 tablet, Oral, BID  sodium chloride, 4 mL, Nebulization, BID - RT      IV meds:                         Data Review:  Results from last 7 days   Lab Units 12/08/23 0657 12/07/23  0833 12/06/23 2132   SODIUM mmol/L 141 134* 138   POTASSIUM mmol/L 4.2 4.3 4.0   CHLORIDE mmol/L 102 100 101   CO2 mmol/L 27.0 21.7* 24.2   BUN mg/dL 35* 46* 48*   CREATININE mg/dL 2.71* 2.79* 3.09*   GLUCOSE mg/dL 167* 331* 151*   CALCIUM mg/dL 9.0 8.4* 8.5*         Estimated Creatinine Clearance: 39 mL/min (A) (by C-G formula based on SCr of 2.71 mg/dL (H)).  Results from last 7 days   Lab Units 12/08/23  0657 12/07/23  0833 12/06/23  2132   WBC 10*3/mm3 20.64* 21.60* 24.82*   HEMOGLOBIN g/dL 8.3* 6.9* 7.7*   PLATELETS 10*3/mm3 466* 474* 435     Results from last 7 days   Lab Units 12/06/23  2132   INR  1.34*     Results from last 7 days   Lab Units 12/08/23  0657 12/06/23 2132   ALT (SGPT) U/L 27 22   AST (SGOT) U/L 23 17         Results from last 7 days   Lab Units 12/06/23  2132 12/04/23  0842   PROCALCITONIN ng/mL 1.33* 2.98*   LACTATE mmol/L 1.3  --          Glucose   Date/Time Value Ref Range Status   12/08/2023 0628 149 (H) 70 - 130 mg/dL Final   12/07/2023 2026 279 (H) 70 - 130 mg/dL Final   12/07/2023 1600 288 (H) 70 - 130 mg/dL Final   12/07/2023 1115 338 (H) 70 - 130  mg/dL Final   12/07/2023 0646 381 (H) 70 - 130 mg/dL Final   12/06/2023 2145 192 (H) 70 - 130 mg/dL Final         Imaging reviewed  Chest x-ray 12/6 reviewed          Microbiology reviewed  No growth to date         Active Hospital Problems    Diagnosis  POA    Bronchiectasis [J47.9]  Yes    Bacterial pneumonia [J15.9]  Yes    Anemia [D64.9]  Yes    Sepsis [A41.9]  Yes    Essential hypertension [I10]  Yes    Chronic obstructive pulmonary disease [J44.9]  Yes    Stage 3b chronic kidney disease [N18.32]  Yes    Neurogenic bladder [N31.9]  Yes    Hyperlipidemia [E78.5]  Yes    Paroxysmal atrial fibrillation [I48.0]  Yes    Obstructive sleep apnea [G47.33]  Yes    Chronic diastolic heart failure [I50.32]  Yes    ERIC (acute kidney injury) [N17.9]  Yes      Resolved Hospital Problems   No resolved problems to display.         ASSESSMENT:  Right lower lobe pneumonia  Bronchiectasis with acute exacerbation  COPD with acute exacerbation  Acute on chronic hypoxemic respiratory failure  Acute kidney injury on chronic kidney disease  Morbid obesity: BMI 41  Type 2 diabetes      PLAN:  Continue antibiotics.  Encourage pulmonary toilet.  Sputum sent for culture will narrow based on culture results.  Aggressive clearance measures with hypertonic saline and bronchodilators.  Wean oxygen as able.  Irregular-appearing infiltrate on x-ray but patient is rotated.  Repeat chest x-ray for follow-up and if does not show signs of improving would consider CT chest.  Has had an abnormality in the right lower lung field going back to November per reports from [place].  I am not able to visualize those images however in [facility].    Nathan Richards MD  Pulmonary and Critical Care Medicine  [facility], [facility]  12/8/2023    07:42 EST       Electronically signed by Nathan Richards MD at 12/08/23 0748          Consult Notes (all)        Nathan Richards MD at 12/07/23 1010        Consult Orders    1. Inpatient Pulmonology  Consult [[id number]] ordered by Iam Eller MD at 12/07/23 0718                       Patient Identification:  Preston Wallis  58 y.o.  male  1965  [id number]          LOS 1    Requesting physician: Dr Eller    Reason for Consultation: Pneumonia    History of Present Illness:   58-year-old male admitted to hospital service with shortness of breath.  Has history of COPD, CHF with chronic hypoxemic respiratory failure.  He uses 2 L supplemental oxygen at home.  Transferred from outlying facility.  Sputum cultures grew Achromobacter.  Infectious disease following and managing antibiotics.  Complains of moderate cough and shortness of breath worse with activity.  No chest pain.  Complains of wheezing.    Past Medical History:  Past Medical History:   Diagnosis Date    Age-related cognitive decline     Allergic contact dermatitis     Allergies     Anemia     Bronchiectasis with acute lower respiratory infection     Charcot foot due to diabetes mellitus 9/10/2013    Chronic diastolic (congestive) heart failure     Chronic kidney disease     Chronic respiratory failure with hypoxia     Closed supracondylar fracture of femur 1/12/2022    COPD (chronic obstructive pulmonary disease)     Deep vein thrombosis (DVT) of lower extremity associated with air travel 1/13/2023    Dependence on supplemental oxygen     Eczema     Erectile dysfunction     due to organic reasons    Essential (primary) hypertension     Fracture     closed fracture of other tarsal and metatarsal bones    Fracture of proximal humerus 1/13/2023    GERD without esophagitis     High risk medication use     Hypercholesteremia     Hypomagnesemia     Infected stasis ulcer of left lower extremity 1/13/2023    Insomnia     Low back pain     Major depressive disorder     Morbid (severe) obesity due to excess calories     MRSA pneumonia     Muscle weakness     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 without evidence  of necrosis     Obstructive sleep apnea (adult) (pediatric)     Other forms of dyspnea     Other long term (current) drug therapy     Other specified noninfective gastroenteritis and colitis     Other spondylosis, lumbar region     Pain in both knees     Paroxysmal atrial fibrillation     Peripheral neuropathy     attributed to type 2 diabetes    Pneumonia, unspecified organism     Polyneuropathy     Rash and other nonspecific skin eruption     Syncope and collapse     Tachycardia     Tinnitus 1/13/2023    Type 1 diabetes mellitus with diabetic chronic kidney disease     Type 2 diabetes mellitus     Unspecified fall, initial encounter     Urinary retention        Past Surgical History:  Past Surgical History:   Procedure Laterality Date    CHOLECYSTECTOMY      CYSTOSCOPY      FEMUR SURGERY Left     Shravan placed    KNEE SURGERY Left     OTHER SURGICAL HISTORY Left     venous port    TONSILLECTOMY AND ADENOIDECTOMY          Home Meds:  Medications Prior to Admission   Medication Sig Dispense Refill Last Dose    atorvastatin (LIPITOR) 20 MG tablet Take 1 tablet by mouth Every Night. 90 tablet 2 12/5/2023    docusate sodium (COLACE) 100 MG capsule Take 1 capsule by mouth Daily. 90 capsule 2 12/5/2023    DULoxetine (CYMBALTA) 60 MG capsule Take 1 capsule by mouth 2 (Two) Times a Day. 180 capsule 2 12/5/2023    finasteride (PROSCAR) 5 MG tablet Take 1 tablet by mouth Daily. 90 tablet 2 12/5/2023    folic acid (FOLVITE) 1 MG tablet Take 1 tablet by mouth Daily. 90 tablet 2 12/6/2023    gabapentin (NEURONTIN) 300 MG capsule TAKE ONE CAPSULE BY MOUTH EVERY MORNING AND TAKE TWO CAPSULES BY MOUTH EVERY NIGHT AT BEDTIME 270 capsule 2 12/6/2023    metoprolol tartrate (LOPRESSOR) 100 MG tablet Take 1 tablet by mouth 2 (Two) Times a Day. 189 tablet 2 12/6/2023    montelukast (SINGULAIR) 10 MG tablet Take 1 tablet by mouth every night at bedtime. 90 tablet 2 12/6/2023    apixaban (Eliquis) 5 MG tablet tablet Take 1 tablet by mouth  Every 12 (Twelve) Hours. (Patient taking differently: Take 1 tablet by mouth Every 12 (Twelve) Hours. On hold by [facility] at [facility]) 180 tablet 2 Unknown    B-D UF III MINI PEN NEEDLES 31G X 5 MM misc USE FOUR TIMES DAILY BEFORE MEALS AND AT BEDTIME 360 each 1 Unknown    Budeson-Glycopyrrol-Formoterol (Breztri Aerosphere) 160-9-4.8 MCG/ACT aerosol inhaler    Unknown    bumetanide (BUMEX) 2 MG tablet Take 1 tablet BY MOUTH TWICE DAILY. call cardiology IF weight is greater THAN 2 pounds in 1 DAY OR 5 pounds in A WEEK. (Patient taking differently: Take 1 tablet BY MOUTH TWICE DAILY. call cardiology IF weight is greater THAN 2 pounds in 1 DAY OR 5 pounds in A WEEK.    To be discontinued by MD at [facility]) 180 tablet 2 Unknown    calcium citrate (CALCITRATE) 950 (200 Ca) MG tablet Take 1 tablet by mouth Daily. 90 tablet 2 Unknown    Cholecalciferol (Vitamin D3) 50 MCG (2000 UT) tablet Take 1 tablet by mouth Daily. 90 tablet 2 Unknown    Continuous Blood Gluc  (FreeStyle Bethany 2 Baker) device    Unknown    Continuous Blood Gluc Sensor (FreeStyle Bethany 2 Sensor) misc USE every 14 DAYS 2 each 11 Unknown    dapagliflozin (Farxiga) 5 MG tablet tablet Take 1 tablet by mouth Daily. 90 tablet 2 Unknown    exenatide er (Bydureon BCise) 2 MG/0.85ML auto-injector injection Inject 0.85 mL under the skin into the appropriate area as directed 1 (One) Time Per Week.   Unknown    famotidine (PEPCID) 40 MG tablet Take 1 tablet by mouth Every Morning. 90 tablet 2 Unknown    ferrous gluconate (FERGON) 324 MG tablet Take 1 tablet by mouth Daily With Breakfast. 90 tablet 2 Unknown    Fluticasone-Salmeterol (ADVAIR/WIXELA) 500-50 MCG/ACT DISKUS Inhale 1 puff 2 (Two) Times a Day.   Unknown    glucose blood test strip 1 each by Other route Daily. Dx:   E11.40 100 each 4 Unknown    Insulin Lispro, 1 Unit Dial, (HUMALOG) 100 UNIT/ML solution pen-injector inject EIGHT units UNDER THE SKIN INTO THE APPROPRIATE AREA THREE TIMES DAILY PER sliding  scale, IF BLOOD SUGAR < 160= 0 units, 161-220= 2 units, 221-280= 4 units, 281-340 = SIX units, 341-400 = EIGHT units 15 mL 1 Unknown    Lantus SoloStar 100 UNIT/ML injection pen INJECT 40 UNITS UNDER THE SKIN ONCE DAILY 15 mL 1 Unknown    losartan (COZAAR) 25 MG tablet Take 1 tablet by mouth Every Morning. To be discontinued by MD at [facility]   Unknown    magnesium oxide (MAG-OX) 400 tablet tablet Take 1 tablet by mouth Daily. Started by [facility]   Unknown    NIFEdipine XL (PROCARDIA XL) 30 MG 24 hr tablet Take 1 tablet by mouth Every Morning. (Patient taking differently: Take 1 tablet by mouth Every Morning. To be discontinued by MD at [facility]) 90 tablet 2 Unknown    O2 (OXYGEN) 2 Liter O2 - CONTINUOUS (route: Oxygen)       ondansetron ODT (ZOFRAN-ODT) 4 MG disintegrating tablet PLACE 1 TABLET ON THE TONGUE EVERY 8 HOURS AS NEEDED FOR NAUSEA AND VOMITING 10 tablet 0 Unknown    Semaglutide, 1 MG/DOSE, (Ozempic, 1 MG/DOSE,) 4 MG/3ML solution pen-injector Inject 1 mg under the skin into the appropriate area as directed 1 (One) Time Per Week. 3 mL 5 Unknown    TRUEplus Lancets 28G misc USE AS DIRECTED 100 each 0 Unknown    Ventolin  (90 Base) MCG/ACT inhaler INHALE 2 PUFFS FOUR TIMES DAILY AS NEEDED FOR WHEEZING 18 g 11          Allergies:  Allergies   Allergen Reactions    Benadryl [Diphenhydramine] Itching    Proventil [Albuterol] Other (See Comments)     Mouth sores         Social History:   Social History     Socioeconomic History    Marital status:    Tobacco Use    Smoking status: Former     Packs/day: 1.00     Years: 12.00     Additional pack years: 0.00     Total pack years: 12.00     Types: Cigarettes     Start date:      Quit date:      Years since quittin.9    Smokeless tobacco: Never   Vaping Use    Vaping Use: Never used   Substance and Sexual Activity    Alcohol use: Not Currently    Drug use: Never    Sexual activity: Defer       Family History:  Family History   Problem Relation  "Age of Onset    Coronary artery disease Mother     Hypertension Mother     Diabetes type II Mother     Asthma Father     Diabetes type II Sister     Cancer Sister        Review of Systems:  Denies fevers or chills  Denies nausea or vomiting  No new vision or hearing changes  No chest pain  Cough and shortness of breath  No diarrhea, hematemesis or hematochezia, no dysuria or frequency  No musculoskeletal complaints  No heat or cold intolerance  No skin rashes  No dizziness or confusion.  No seizure activity  No new anxiety or depression  12 system review of systems performed and all else negative    Objective:    PHYSICAL EXAM:    /64 (BP Location: Left arm, Patient Position: Lying)   Pulse 96   Temp 97.9 °F (36.6 °C) (Oral)   Resp 16   Ht 175.3 cm (69\")   Wt 126 kg (277 lb 12.5 oz)   SpO2 100%   BMI 41.02 kg/m²  Body mass index is 41.02 kg/m². 100% 126 kg (277 lb 12.5 oz)    GENERAL APPEARANCE:   Well developed  Well nourished  No acute distress   EYES:    PERRL                                                                           Conjunctivae normal  Sclerae nonicteric.  HENT:   Atraumatic, normocephalic  External ears and nose normal  Moist mucous membranes and no ulcers  NECK:  Thyroid not enlarged  Trachea midline   RESPIRATORY:    Nonlabored breathing   Diminished bilateral breath sounds  Rhonchi at bases right greater than left. + wheezing  No dullness  CARDIOVASCULAR:    RRR  Normal S1, S2  No murmur  Lower extremity edema: none    GI:   Bowel sounds normal  Abdomen soft , nondistended, nontender  No abdominal masses  MUSCULOSKELETAL:  Normal movement of extremities  No tenderness, no deformities  No clubbing or cyanosis   Skin:    No visible rashes  No palpable nodules  Cap refill normal.  No mottling.   PSYCHIATRIC:  Speech and behavior appropriate  Normal mood and affect  Oriented to person, place and time  NEUROLOGIC:  Cranial nerves II through XII grossly intact.  Sensation " intact.      Lab Review:   Results from last 7 days   Lab Units 12/07/23  0833 12/06/23  2132   WBC 10*3/mm3 21.60* 24.82*   HEMOGLOBIN g/dL 6.9* 7.7*   HEMATOCRIT % 22.0* 23.3*   PLATELETS 10*3/mm3 474* 435     Results from last 7 days   Lab Units 12/07/23  0833 12/06/23  2132   SODIUM mmol/L 134* 138   POTASSIUM mmol/L 4.3 4.0   CHLORIDE mmol/L 100 101   CO2 mmol/L 21.7* 24.2   BUN mg/dL 46* 48*   CREATININE mg/dL 2.79* 3.09*   CALCIUM mg/dL 8.4* 8.5*   BILIRUBIN mg/dL  --  0.3   ALK PHOS U/L  --  124*   ALT (SGPT) U/L  --  22   AST (SGOT) U/L  --  17   GLUCOSE mg/dL 331* 151*                       Imaging reviewed  chest X-ray 12/7/2023 reviewed shows right basilar airspace disease.          CT chest 11/28 reviewed shows right lower lobe infiltrate with cystic bronchiectasis and chronic scarring right upper lobe.  Cardiomegaly.         Assessment:  Right lower lobe pneumonia  Bronchiectasis with acute exacerbation  COPD with acute exacerbation  Acute on chronic hypoxemic respiratory failure  Acute kidney injury on chronic kidney disease  Morbid obesity: BMI 41  Type 2 diabetes    Recommendations:  Agree with antibiotic changes.  Sending sputum for culture and narrow based on culture results.  Add hypertonic saline nebs to help with clearance.  Bronchodilators for COPD exacerbation.  Increase frequency.  Wean oxygen as able.        Thank you for allowing me to participate in the care of this patient.  I will continue to follow along with you.      Nathan Richards MD  [facility], [facility]  Pulmonary and Critical Care Medicine    12/7/2023  10:11 EST    Electronically signed by Nathan Richards MD at 12/07/23 1017       Mcnamara, Africa Agustín at 12/07/23 1000        Consult Orders    1. Inpatient Consult to Advance Care Planning [[id number]] ordered by Masha Gomez MD at 12/07/23 0329                 I was requested to see patient regarding an Advance Directive.  Patient has copy in chart and does not  want to make any changes.      Electronically signed by Africa Mcnamara at 23 1042       Buddy Ignacio MD at 23 0918        Consult Orders    1. Inpatient Nephrology Consult [[id number]] ordered by Alan Alejandra APRN at 23 0931                   [facility] Consult Note      Patient Name: Preston Wallis  : 1965  MRN: [id number]  Primary Care Physician:  Kimmy Riley MD  Referring Physician: Provider Not In System  Date of admission: 2023    Subjective     Reason for Consult: Acute kidney injury on chronic kidney disease    HPI:   Preston Wallis is a 58 y.o. male was transferred from [facility] on 2023.  He has bronchiectasis and recurrent pneumonia, diabetes mellitus type 2 with renal complication, history of congestive heart failure, chronic kidney disease followed by my partner Dr. Martínez in our office in [place].  His chronic kidney disease associated with diabetes mellitus diabetic nephropathy baseline creatinine 1.6-1.8, he has diabetic neuropathy but denies retinopathy, he has hypertension and is morbidly obese.  History of neurogenic bladder doing self-catheterization at home.    Patient has chronic shortness of breath he denies orthopnea or PND, no chest pain, no nausea or vomiting, has Doyle catheter anchored in place and he has been doing self-catheterization at home    Review of Systems:   14 point review of systems is otherwise negative except for mentioned above on HPI    Personal History     Past Medical History:   Diagnosis Date    Age-related cognitive decline     Allergic contact dermatitis     Allergies     Anemia     Bronchiectasis with acute lower respiratory infection     Charcot foot due to diabetes mellitus 9/10/2013    Chronic diastolic (congestive) heart failure     Chronic kidney disease     Chronic respiratory failure with hypoxia     Closed supracondylar fracture of femur 2022    COPD  (chronic obstructive pulmonary disease)     Deep vein thrombosis (DVT) of lower extremity associated with air travel 1/13/2023    Dependence on supplemental oxygen     Eczema     Erectile dysfunction     due to organic reasons    Essential (primary) hypertension     Fracture     closed fracture of other tarsal and metatarsal bones    Fracture of proximal humerus 1/13/2023    GERD without esophagitis     High risk medication use     Hypercholesteremia     Hypomagnesemia     Infected stasis ulcer of left lower extremity 1/13/2023    Insomnia     Low back pain     Major depressive disorder     Morbid (severe) obesity due to excess calories     MRSA pneumonia     Muscle weakness     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 without evidence of necrosis     Obstructive sleep apnea (adult) (pediatric)     Other forms of dyspnea     Other long term (current) drug therapy     Other specified noninfective gastroenteritis and colitis     Other spondylosis, lumbar region     Pain in both knees     Paroxysmal atrial fibrillation     Peripheral neuropathy     attributed to type 2 diabetes    Pneumonia, unspecified organism     Polyneuropathy     Rash and other nonspecific skin eruption     Syncope and collapse     Tachycardia     Tinnitus 1/13/2023    Type 1 diabetes mellitus with diabetic chronic kidney disease     Type 2 diabetes mellitus     Unspecified fall, initial encounter     Urinary retention        Past Surgical History:   Procedure Laterality Date    CHOLECYSTECTOMY      CYSTOSCOPY      FEMUR SURGERY Left     Shravan placed    KNEE SURGERY Left     OTHER SURGICAL HISTORY Left     venous port    TONSILLECTOMY AND ADENOIDECTOMY         Family History: family history includes Asthma in his father; Cancer in his sister; Coronary artery disease in his mother; Diabetes type II in his mother and sister; Hypertension in his mother.    Social History:  reports that he quit smoking about 30  years ago. His smoking use included cigarettes. He started smoking about 42 years ago. He has a 12.00 pack-year smoking history. He has never used smokeless tobacco. He reports that he does not currently use alcohol. He reports that he does not use drugs.    Home Medications:  Prior to Admission medications    Medication Sig Start Date End Date Taking? Authorizing Provider   atorvastatin (LIPITOR) 20 MG tablet Take 1 tablet by mouth Every Night. 10/26/23  Yes Kimmy Riley MD   docusate sodium (COLACE) 100 MG capsule Take 1 capsule by mouth Daily. 10/26/23  Yes Kimmy Riley MD   DULoxetine (CYMBALTA) 60 MG capsule Take 1 capsule by mouth 2 (Two) Times a Day. 10/26/23  Yes Kimmy Riley MD   finasteride (PROSCAR) 5 MG tablet Take 1 tablet by mouth Daily. 10/26/23  Yes Kimmy Riley MD   folic acid (FOLVITE) 1 MG tablet Take 1 tablet by mouth Daily. 10/26/23  Yes Kimmy Riley MD   gabapentin (NEURONTIN) 300 MG capsule TAKE ONE CAPSULE BY MOUTH EVERY MORNING AND TAKE TWO CAPSULES BY MOUTH EVERY NIGHT AT BEDTIME 10/26/23  Yes Kimmy Riley MD   metoprolol tartrate (LOPRESSOR) 100 MG tablet Take 1 tablet by mouth 2 (Two) Times a Day. 10/26/23  Yes Kimmy Riley MD   montelukast (SINGULAIR) 10 MG tablet Take 1 tablet by mouth every night at bedtime. 10/26/23  Yes Kimmy Riley MD   apixaban (Eliquis) 5 MG tablet tablet Take 1 tablet by mouth Every 12 (Twelve) Hours.  Patient taking differently: Take 1 tablet by mouth Every 12 (Twelve) Hours. On hold by MD at [facility] 10/26/23   Kimmy Riley MD   B-D UF III MINI PEN NEEDLES 31G X 5 MM misc USE FOUR TIMES DAILY BEFORE MEALS AND AT BEDTIME 9/12/23   Neli Paredes APRN   Budeson-Glycopyrrol-Formoterol (Breztri Aerosphere) 160-9-4.8 MCG/ACT aerosol inhaler  3/20/23   ProviderBreann MD   bumetanide (BUMEX) 2 MG tablet Take 1 tablet BY MOUTH TWICE DAILY. call cardiology IF weight is greater THAN 2  pounds in 1 DAY OR 5 pounds in A WEEK.  Patient taking differently: Take 1 tablet BY MOUTH TWICE DAILY. call cardiology IF weight is greater THAN 2 pounds in 1 DAY OR 5 pounds in A WEEK.    To be discontinued by MD at [facility] 10/26/23   Kimmy Riley MD   calcium citrate (CALCITRATE) 950 (200 Ca) MG tablet Take 1 tablet by mouth Daily. 10/26/23   Kimmy Riley MD   Cholecalciferol (Vitamin D3) 50 MCG (2000 UT) tablet Take 1 tablet by mouth Daily. 10/26/23   Kimmy Riley MD   Continuous Blood Gluc  (FreeStyle [place]any 2 [place]) device  1/17/23   ProviderBreann MD   Continuous Blood Gluc Sensor (FreeStyle Bethany 2 Sensor) misc USE every 14 DAYS 11/8/23   Neli Paredes APRN   dapagliflozin (Farxiga) 5 MG tablet tablet Take 1 tablet by mouth Daily. 10/26/23   Kimmy Riley MD   exenatide er (Bydureon BCise) 2 MG/0.85ML auto-injector injection Inject 0.85 mL under the skin into the appropriate area as directed 1 (One) Time Per Week.    Breann Shukla MD   famotidine (PEPCID) 40 MG tablet Take 1 tablet by mouth Every Morning. 10/26/23   Kimmy Riley MD   ferrous gluconate (FERGON) 324 MG tablet Take 1 tablet by mouth Daily With Breakfast. 10/26/23   Kimmy Riley MD   Fluticasone-Salmeterol (ADVAIR/WIXELA) 500-50 MCG/ACT DISKUS Inhale 1 puff 2 (Two) Times a Day.    ProviderBreann MD   glucose blood test strip 1 each by Other route Daily. Dx:   E11.40 1/25/23   Kimmy Riley MD   Insulin Lispro, 1 Unit Dial, (HUMALOG) 100 UNIT/ML solution pen-injector inject EIGHT units UNDER THE SKIN INTO THE APPROPRIATE AREA THREE TIMES DAILY PER sliding scale, IF BLOOD SUGAR < 160= 0 units, 161-220= 2 units, 221-280= 4 units, 281-340 = SIX units, 341-400 = EIGHT units 11/9/23   Neli Paredes APRN   Lantus SoloStar 100 UNIT/ML injection pen INJECT 40 UNITS UNDER THE SKIN ONCE DAILY 9/11/23   Kimmy Riley MD   losartan (COZAAR) 25 MG tablet  Take 1 tablet by mouth Every Morning. To be discontinued by MD at [facility] 1/23/23   Joseph Martínez MD   magnesium oxide (MAG-OX) 400 tablet tablet Take 1 tablet by mouth Daily. Started by [facility]    Breann Shukla MD   NIFEdipine XL (PROCARDIA XL) 30 MG 24 hr tablet Take 1 tablet by mouth Every Morning.  Patient taking differently: Take 1 tablet by mouth Every Morning. To be discontinued by MD at [facility] 10/26/23   Kimmy Riley MD   O2 (OXYGEN) 2 Liter O2 - CONTINUOUS (route: Oxygen) 2/1/22   Breann Shukla MD   ondansetron ODT (ZOFRAN-ODT) 4 MG disintegrating tablet PLACE 1 TABLET ON THE TONGUE EVERY 8 HOURS AS NEEDED FOR NAUSEA AND VOMITING 11/22/23   Kimmy Riley MD   Semaglutide, 1 MG/DOSE, (Ozempic, 1 MG/DOSE,) 4 MG/3ML solution pen-injector Inject 1 mg under the skin into the appropriate area as directed 1 (One) Time Per Week. 11/10/23   Neli Paredes APRN   TRUEplus Lancets 28G misc USE AS DIRECTED 7/29/21   Kimmy Riley MD   Ventolin  (90 Base) MCG/ACT inhaler INHALE 2 PUFFS FOUR TIMES DAILY AS NEEDED FOR WHEEZING 7/13/23   Betzaida Nelson APRN       Allergies:  Allergies   Allergen Reactions    Benadryl [Diphenhydramine] Itching    Proventil [Albuterol] Other (See Comments)     Mouth sores         Objective     Vitals:   Temp:  [97.9 °F (36.6 °C)-98.2 °F (36.8 °C)] 97.9 °F (36.6 °C)  Heart Rate:  [] 101  Resp:  [18-20] 20  BP: (134-149)/(58-67) 149/64  Flow (L/min):  [3-4] 3    Intake/Output Summary (Last 24 hours) at 12/7/2023 0918  Last data filed at 12/7/2023 0854  Gross per 24 hour   Intake 240 ml   Output 2200 ml   Net -1960 ml       Physical Exam:   Constitutional: Awake, alert, no acute distress.  Chronically ill and morbidly  HEENT: Sclera anicteric, no conjunctival injection  Neck: Supple, no thyromegaly, no lymphadenopathy, trachea at midline, no JVD  Respiratory: Bilateral rhonchi, nonlabored respiration  Cardiovascular: RRR, no murmurs,  no rubs or gallops, no carotid bruit  Gastrointestinal: Positive bowel sounds, abdomen is soft, nontender and nondistended  : Doyle catheter anchored  Musculoskeletal: 2+ edema, no clubbing or cyanosis  Psychiatric: Appropriate affect, cooperative  Neurologic: Oriented x3, moving all extremities, normal speech and mental status  Skin: Warm and dry       Scheduled Meds:     acetaminophen, 650 mg, Oral, Once   Or  acetaminophen, 650 mg, Oral, Once   Or  acetaminophen, 650 mg, Rectal, Once  atorvastatin, 20 mg, Oral, Nightly  budesonide-formoterol, 2 puff, Inhalation, BID - RT  docusate sodium, 100 mg, Oral, Daily  famotidine, 40 mg, Oral, QAM  finasteride, 5 mg, Oral, Daily  gabapentin, 300 mg, Oral, Q8H  insulin glargine, 30 Units, Subcutaneous, Nightly  insulin lispro, 2-9 Units, Subcutaneous, 4x Daily AC & at Bedtime  ipratropium-albuterol, 3 mL, Nebulization, 4x Daily - RT  metoprolol tartrate, 100 mg, Oral, BID  montelukast, 10 mg, Oral, Daily  piperacillin-tazobactam, 4.5 g, Intravenous, Once  piperacillin-tazobactam, 4.5 g, Intravenous, Q8H  senna-docusate sodium, 2 tablet, Oral, BID      IV Meds:        Results Reviewed:   I have personally reviewed the results from the time of this admission to 12/7/2023 09:18 EST     Lab Results   Component Value Date    GLUCOSE 331 (H) 12/07/2023    CALCIUM 8.4 (L) 12/07/2023     (L) 12/07/2023    K 4.3 12/07/2023    CO2 21.7 (L) 12/07/2023     12/07/2023    BUN 46 (H) 12/07/2023    CREATININE 2.79 (H) 12/07/2023    EGFRIFNONA 46 (L) 07/27/2021    BCR 16.5 12/07/2023    ANIONGAP 12.3 12/07/2023      Lab Results   Component Value Date    MG 2.3 11/30/2023    PHOS 4.0 05/25/2023    ALBUMIN 2.8 (L) 12/06/2023           Assessment / Plan       Paroxysmal atrial fibrillation    Obstructive sleep apnea    Chronic diastolic heart failure    ERIC (acute kidney injury)    Hyperlipidemia    Neurogenic bladder    Chronic obstructive pulmonary disease    Essential  hypertension    Stage 3b chronic kidney disease    Sepsis    Bronchiectasis    Bacterial pneumonia    Anemia      ASSESSMENT:  Acute kidney injury on chronic kidney disease, multifactorial in etiology, creatinine slightly better today than on admission, creatinine today 2.79  Chronic kidney disease stage III secondary to diabetic nephropathy baseline creatinine 1.6-1.8  6 bronchiectasis pneumonia being evaluated by ID  Diabetes mellitus type 2 with renal complication  Diabetic neuropathy  Neurogenic bladder quiring self-catheterization currently has Doyle catheter anchored in  Severe anemia, hemoglobin down to 6.9  Morbid obesity  Hypertension with chronic kidney disease, reasonably controlled    PLAN:  Check random urine for sodium, chloride and protein to creatinine  I agree with the present treatment  Packed RBCs transfusion already been ordered by the primary team  Surveillance labs    I reviewed the chart and other providers notes, I reviewed labs  I discussed the case with the patient and he voiced good understanding.    Thank you for involving us in the care of Preston Wallis.  Please feel free to call with any questions.    Buddy Ignacio MD  12/07/23  09:18 [facility]    [facility]  [phone]      Please note that portions of this note were completed with a voice recognition program.    Electronically signed by Buddy Ignacio MD at 12/07/23 0934       Byron Hinds DO at 12/07/23 0841        Consult Orders    1. Inpatient Infectious Diseases Consult [[id number]] ordered by Alan Alejandra APRN at 12/06/23 2036                 Referring Provider: Provider Not In System  Reason for Consultation:     +sputum culture showing achromobacter xylosoxidans         Subjective   History of present illness: Patient is a 58-year-old male with past medical history of bronchiectasis with recurrent pneumonia, diabetes, CHF, CKD, COPD and morbid obesity who presents as transfer  from [facility] due to pneumonia.  ID consulted for positive sputum culture showing Achromobacter xylosoxidans.    Patient has chronic baseline oxygen requirement of 2 L and was reportedly up to 4 L at outside [facility] with abnormal chest x-ray and abnormal CT scan showing right lower lobe pneumonia.  Sputum culture grew Achromobacter and he was originally placed on Bactrim plus Zosyn but later transition to Zyvox.  Also had small I&D of a abscess on the abdominal wall that has been culture negative.    Today patient reports she is feeling much better.  Currently on 4 L nasal cannula.  States his cough has improved significantly.  Reported a significant amount of sputum production on admission however this is now minimal.  Denies any fevers or chills.  Denies any nausea, vomiting, diarrhea.  Denies any abdominal pain.    Patient remains afebrile with WBC count of 24.  Procalcitonin improved from 3 to 1.3.  Lactate is normal.  Continues to have worsening renal dysfunction with creatinine up to 3.    Past Medical History:   Diagnosis Date    Age-related cognitive decline     Allergic contact dermatitis     Allergies     Anemia     Bronchiectasis with acute lower respiratory infection     Charcot foot due to diabetes mellitus 9/10/2013    Chronic diastolic (congestive) heart failure     Chronic kidney disease     Chronic respiratory failure with hypoxia     Closed supracondylar fracture of femur 1/12/2022    COPD (chronic obstructive pulmonary disease)     Deep vein thrombosis (DVT) of lower extremity associated with air travel 1/13/2023    Dependence on supplemental oxygen     Eczema     Erectile dysfunction     due to organic reasons    Essential (primary) hypertension     Fracture     closed fracture of other tarsal and metatarsal bones    Fracture of proximal humerus 1/13/2023    GERD without esophagitis     High risk medication use     Hypercholesteremia     Hypomagnesemia     Infected stasis  ulcer of left lower extremity 1/13/2023    Insomnia     Low back pain     Major depressive disorder     Morbid (severe) obesity due to excess calories     MRSA pneumonia     Muscle weakness     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 without evidence of necrosis     Obstructive sleep apnea (adult) (pediatric)     Other forms of dyspnea     Other long term (current) drug therapy     Other specified noninfective gastroenteritis and colitis     Other spondylosis, lumbar region     Pain in both knees     Paroxysmal atrial fibrillation     Peripheral neuropathy     attributed to type 2 diabetes    Pneumonia, unspecified organism     Polyneuropathy     Rash and other nonspecific skin eruption     Syncope and collapse     Tachycardia     Tinnitus 1/13/2023    Type 1 diabetes mellitus with diabetic chronic kidney disease     Type 2 diabetes mellitus     Unspecified fall, initial encounter     Urinary retention        Past Surgical History:   Procedure Laterality Date    CHOLECYSTECTOMY      CYSTOSCOPY      FEMUR SURGERY Left     Shravan placed    KNEE SURGERY Left     OTHER SURGICAL HISTORY Left     venous port    TONSILLECTOMY AND ADENOIDECTOMY         family history includes Asthma in his father; Cancer in his sister; Coronary artery disease in his mother; Diabetes type II in his mother and sister; Hypertension in his mother.     reports that he quit smoking about 30 years ago. His smoking use included cigarettes. He started smoking about 42 years ago. He has a 12.00 pack-year smoking history. He has never used smokeless tobacco. He reports that he does not currently use alcohol. He reports that he does not use drugs.     Allergies   Allergen Reactions    Benadryl [Diphenhydramine] Itching    Proventil [Albuterol] Other (See Comments)     Mouth sores         Medication:  Antibiotics:  Anti-Infectives (From admission, onward)      Ordered     Dose/Rate Route Frequency Start Stop     12/06/23 2219  Linezolid (ZYVOX) 600 mg 300 mL        Ordering Provider: Alan Alejandra APRN    600 mg  300 mL/hr over 60 Minutes Intravenous Every 12 Hours 12/06/23 2315 12/13/23 2314              Objective     Physical Exam:   Vital Signs   Temp:  [97.9 °F (36.6 °C)-98.2 °F (36.8 °C)] 97.9 °F (36.6 °C)  Heart Rate:  [] 97  Resp:  [18] 18  BP: (134-149)/(58-67) 149/64    GENERAL: Awake and alert, in no acute distress.   HEENT: Oropharynx is clear. Hearing is grossly normal.   EYES: PERRL. No conjunctival injection. No lid lag.   LUNGS: Normal work of breathing on 4 L nasal cannula  GI: Soft, nontender, nondistended.   SKIN: I&D site around the bellybutton with no surrounding erythema.  PSYCHIATRIC: Appropriate mood, affect, insight, and judgment.     Results Review:   I reviewed the patient's new clinical results.  I reviewed the patient's new imaging results and agree with the interpretation.  I reviewed the patient's other test results and agree with the interpretation    Lab Results   Component Value Date    WBC 24.82 (H) 12/06/2023    HGB 7.7 (L) 12/06/2023    HCT 23.3 (L) 12/06/2023    MCV 86.3 12/06/2023     12/06/2023       Lab Results   Component Value Date    VANCOTROUGH 26.3 (C) 12/01/2023    VANCORANDOM 12.45 04/07/2022       Lab Results   Component Value Date    GLUCOSE 151 (H) 12/06/2023    BUN 48 (H) 12/06/2023    CREATININE 3.09 (H) 12/06/2023    EGFRIFNONA 46 (L) 07/27/2021    BCR 15.5 12/06/2023    CO2 24.2 12/06/2023    CALCIUM 8.5 (L) 12/06/2023    PROTENTOTREF 7.1 08/22/2022    ALBUMIN 2.8 (L) 12/06/2023    LABIL2 0.8 08/22/2022    AST 17 12/06/2023    ALT 22 12/06/2023         Estimated Creatinine Clearance: 34.2 mL/min (A) (by C-G formula based on SCr of 3.09 mg/dL (H)).      Microbiology:  Outside studies:  - Sputum culture Achromobacter xylosoxidans  -MRSA nares positive  -Abdominal wound culture no growth    Radiology:  Outside CT chest report reviewed with right lower  lobe pneumonia    Assessment     #Right lower lobe pneumonia  #ERIC on CKD  #Acute hypoxic respiratory failure  #Bronchiectasis with acute exacerbation  #Morbid obesity, BMI 41  #Type 2 diabetes    Culture isolating actinobacter xylosoxidans at outside hospital and recommend stopping Zyvox with transition to Zosyn 4.5 g every 8 hours.  Obtain new sputum culture, strep pneumo urine antigen, MRSA nares and Legionella urine antigen.  Agree with pulmonary consult.      Thank you for this consult.  We will continue to follow along and tailor antibiotics as the patient's clinical course evolves.      Electronically signed by Byron Hinds DO at 12/07/23 2882

## 2023-12-08 NOTE — CONSULTS
"Diabetes Education  Assessment/Teaching    Patient Name:  Preston Wallis  YOB: 1965  MRN: [id number]  Admit Date:  12/6/2023      Assessment Date:  12/8/2023  Flowsheet Row Most Recent Value   General Information     Referral From: A1c, Database  [A1C 8.7%]   Height 175.3 cm (69\")   Height Method Stated   Weight 126 kg (277 lb 12.5 oz)   Weight Method Bed scale   Are you currently involved in an activity/exercise program?  No   Patient expressed need no questions a tthis time   Pregnancy Assessment    Diabetes History    What type of diabetes do you have? Type 2   Length of Diabetes Diagnosis 10 + years   Current DM knowledge good   Have you had diabetes education/teaching in the past? yes   Do you test your blood sugar at home? yes   Frequency of checks Bethany CGM   Have you had low blood sugar? (<70mg/dl) yes   How often do you have low blood sugar? rare   Have you had high blood sugar? (>140mg/dl) yes   How often do you have high blood sugar? rare   How would you rate your diabetes control? good   What makes it difficult for you to take care of your diabetes or yourself? all my other haelth issues   Education Preferences    What areas of diabetes would you like to learn about? avoiding high blood sugar, avoiding low blood sugar, testing my blood sugar at home   Nutrition Information    Assessment Topics    Reducing Risk - Assessment Needs education   DM Goals             Flowsheet Row Most Recent Value   DM Education Needs    Meter Has own   Meter Type Other (comment)  [Bethany CGM]   Medication Insulin, Other injectables  [ozempic Humulog and Lantus]   Physical Activity Frequency Rarely   Healthy Coping Appropriate   Discharge Plan Home, Follow-up with MD   Motivation Moderate   Teaching Method Explanation, Discussion   Patient Response Verbalized understanding              Other Comments:  discussed with pt his diabetes management.He state he wears a Bethany CGM. He states he is seeing a PCP regularly " and is very regular about taking his diabetes medications. No questions at this time. Current A1c is 8.7%        Electronically signed by:  Chichi Mcmullen RN  12/08/23 12:14 EST

## 2023-12-08 NOTE — PROGRESS NOTES
[facility] INPATIENT PROGRESS NOTE         [address]    2023      PATIENT IDENTIFICATION:  Name: Preston Wallis ADMIT: 2023   : 1965  PCP: Kimmy Riley MD    MRN: [id number] LOS: 2 days   AGE/SEX: 58 y.o. male  ROOM: [facility]                     LOS 2    Reason for visit: Pneumonia and COPD exacerbation      SUBJECTIVE:      Less short of breath.  On 2 L supplemental oxygen with saturations 99%.    Objective   OBJECTIVE:    Vital Sign Min/Max for last 24 hours  Temp  Min: 97.3 °F (36.3 °C)  Max: 98.4 °F (36.9 °C)   BP  Min: 106/69  Max: 149/83   Pulse  Min: 78  Max: 101   Resp  Min: 16  Max: 20   SpO2  Min: 88 %  Max: 100 %   No data recorded   No data recorded    Vitals:    23 0000 23 0405 23 0647 23 0717   BP:    130/60   BP Location:    Left arm   Patient Position:    Lying   Pulse: 83 78 84    Resp:   20 20   Temp:    97.7 °F (36.5 °C)   TempSrc:    Oral   SpO2:  100% 95%    Weight:       Height:                23  1904   Weight: 126 kg (277 lb 12.5 oz)       Body mass index is 41.02 kg/m².                          Body mass index is 41.02 kg/m².    Intake/Output Summary (Last 24 hours) at 2023 0742  Last data filed at 2023 0650  Gross per 24 hour   Intake 1035.17 ml   Output 4600 ml   Net -3564.83 ml         Exam:  GEN:  No distress, appears stated age  EYES:   PERRL, anicteric sclerae  ENT:    External ears/nose normal, OP clear  NECK:  No adenopathy, midline trachea  LUNGS: Normal chest on inspection, palpation and coarse right base and diminished breath sounds bilaterally on auscultation  CV:  Normal S1S2, without murmur  ABD:  Nontender, nondistended, no hepatosplenomegaly, +BS  EXT:  No edema.  No cyanosis or clubbing.  No mottling and normal cap refill.    Assessment     Scheduled meds:  atorvastatin, 20 mg, Oral, Nightly  budesonide-formoterol, 2 puff, Inhalation, BID - RT  docusate sodium, 100 mg, Oral, Daily  famotidine,  40 mg, Oral, QAM  finasteride, 5 mg, Oral, Daily  gabapentin, 300 mg, Oral, Q8H  hydrocortisone-bacitracin-zinc oxide-nystatin, 1 application , Topical, Q12H  insulin glargine, 30 Units, Subcutaneous, Nightly  insulin lispro, 3-14 Units, Subcutaneous, 4x Daily AC & at Bedtime  ipratropium-albuterol, 3 mL, Nebulization, Q4H - RT  metoprolol tartrate, 100 mg, Oral, BID  montelukast, 10 mg, Oral, Daily  mupirocin, 1 application , Topical, Q12H  piperacillin-tazobactam, 4.5 g, Intravenous, Q8H  senna-docusate sodium, 2 tablet, Oral, BID  sodium chloride, 4 mL, Nebulization, BID - RT      IV meds:                         Data Review:  Results from last 7 days   Lab Units 12/08/23 0657 12/07/23  0833 12/06/23 2132   SODIUM mmol/L 141 134* 138   POTASSIUM mmol/L 4.2 4.3 4.0   CHLORIDE mmol/L 102 100 101   CO2 mmol/L 27.0 21.7* 24.2   BUN mg/dL 35* 46* 48*   CREATININE mg/dL 2.71* 2.79* 3.09*   GLUCOSE mg/dL 167* 331* 151*   CALCIUM mg/dL 9.0 8.4* 8.5*         Estimated Creatinine Clearance: 39 mL/min (A) (by C-G formula based on SCr of 2.71 mg/dL (H)).  Results from last 7 days   Lab Units 12/08/23 0657 12/07/23  0833 12/06/23 2132   WBC 10*3/mm3 20.64* 21.60* 24.82*   HEMOGLOBIN g/dL 8.3* 6.9* 7.7*   PLATELETS 10*3/mm3 466* 474* 435     Results from last 7 days   Lab Units 12/06/23 2132   INR  1.34*     Results from last 7 days   Lab Units 12/08/23 0657 12/06/23 2132   ALT (SGPT) U/L 27 22   AST (SGOT) U/L 23 17         Results from last 7 days   Lab Units 12/06/23 2132 12/04/23  0842   PROCALCITONIN ng/mL 1.33* 2.98*   LACTATE mmol/L 1.3  --          Glucose   Date/Time Value Ref Range Status   12/08/2023 0628 149 (H) 70 - 130 mg/dL Final   12/07/2023 2026 279 (H) 70 - 130 mg/dL Final   12/07/2023 1600 288 (H) 70 - 130 mg/dL Final   12/07/2023 1115 338 (H) 70 - 130 mg/dL Final   12/07/2023 0646 381 (H) 70 - 130 mg/dL Final   12/06/2023 2145 192 (H) 70 - 130 mg/dL Final         Imaging reviewed  Chest x-ray 12/6  reviewed          Microbiology reviewed  No growth to date          Active Hospital Problems    Diagnosis  POA    Bronchiectasis [J47.9]  Yes    Bacterial pneumonia [J15.9]  Yes    Anemia [D64.9]  Yes    Sepsis [A41.9]  Yes    Essential hypertension [I10]  Yes    Chronic obstructive pulmonary disease [J44.9]  Yes    Stage 3b chronic kidney disease [N18.32]  Yes    Neurogenic bladder [N31.9]  Yes    Hyperlipidemia [E78.5]  Yes    Paroxysmal atrial fibrillation [I48.0]  Yes    Obstructive sleep apnea [G47.33]  Yes    Chronic diastolic heart failure [I50.32]  Yes    ERIC (acute kidney injury) [N17.9]  Yes      Resolved Hospital Problems   No resolved problems to display.         ASSESSMENT:  Right lower lobe pneumonia  Bronchiectasis with acute exacerbation  COPD with acute exacerbation  Acute on chronic hypoxemic respiratory failure  Acute kidney injury on chronic kidney disease  Morbid obesity: BMI 41  Type 2 diabetes      PLAN:  Continue antibiotics.  Encourage pulmonary toilet.  Sputum sent for culture will narrow based on culture results.  Aggressive clearance measures with hypertonic saline and bronchodilators.  Wean oxygen as able.  Irregular-appearing infiltrate on x-ray but patient is rotated.  Repeat chest x-ray for follow-up and if does not show signs of improving would consider CT chest.  Has had an abnormality in the right lower lung field going back to November per reports from [place].  I am not able to visualize those images however in [facility].    Nathan Richards MD  Pulmonary and Critical Care Medicine  [facility], [facility]  12/8/2023    07:42 EST

## 2023-12-08 NOTE — PROGRESS NOTES
[facility] Progress Note      Patient Name: Preston Wallis  : 1965  MRN: [id number]  Primary Care Physician:  Kimmy Riley MD  Date of admission: 2023    Subjective     Interval History:   Follow-up acute kidney injury on chronic kidney disease    Patient is feeling the same, denies any chest pain or shortness of air, no orthopnea or PND, no nausea or vomiting, no abdominal pain, he has Doyle catheter anchored in place, urine output yesterday was 4600 cc    Review of Systems:   As noted above    Objective     Vitals:   Temp:  [97.3 °F (36.3 °C)-98.4 °F (36.9 °C)] 97.7 °F (36.5 °C)  Heart Rate:  [78-92] 84  Resp:  [16-20] 20  BP: (106-149)/(60-83) 130/60  Flow (L/min):  [1-3] 2    Intake/Output Summary (Last 24 hours) at 2023 1013  Last data filed at 2023 0900  Gross per 24 hour   Intake 1035.17 ml   Output 3850 ml   Net -2814.83 ml       Physical Exam:    General Appearance: alert, awake, chronically ill, obese, no acute distress   Skin: warm and dry  HEENT: oral mucosa normal, nonicteric sclera  Neck: supple, no JVD  Lungs: Scattered rhonchi, breathing effort not labored  Heart: RRR, normal S1 and S2, no S3, no rub  Abdomen: soft, nontender, nondistended, normoactive bowel  : Doyle catheter anchored in place  Extremities: 1+ pedal and pretibial edema.  Neuro: normal speech and mental status     Scheduled Meds:     atorvastatin, 20 mg, Oral, Nightly  budesonide-formoterol, 2 puff, Inhalation, BID - RT  docusate sodium, 100 mg, Oral, Daily  famotidine, 40 mg, Oral, QAM  finasteride, 5 mg, Oral, Daily  gabapentin, 300 mg, Oral, Q8H  hydrocortisone-bacitracin-zinc oxide-nystatin, 1 application , Topical, Q12H  insulin glargine, 30 Units, Subcutaneous, Nightly  insulin lispro, 3-14 Units, Subcutaneous, 4x Daily AC & at Bedtime  ipratropium-albuterol, 3 mL, Nebulization, Q4H - RT  metoprolol tartrate, 100 mg, Oral, BID  montelukast, 10 mg, Oral,  Daily  mupirocin, 1 application , Topical, Q12H  piperacillin-tazobactam, 4.5 g, Intravenous, Q8H  senna-docusate sodium, 2 tablet, Oral, BID  sodium chloride, 4 mL, Nebulization, BID - RT      IV Meds:        Results Reviewed:   I have personally reviewed the results from the time of this admission to 12/8/2023 10:13 EST     Results from last 7 days   Lab Units 12/08/23  0657 12/07/23  0833 12/06/23  2132   SODIUM mmol/L 141 134* 138   POTASSIUM mmol/L 4.2 4.3 4.0   CHLORIDE mmol/L 102 100 101   CO2 mmol/L 27.0 21.7* 24.2   BUN mg/dL 35* 46* 48*   CREATININE mg/dL 2.71* 2.79* 3.09*   CALCIUM mg/dL 9.0 8.4* 8.5*   BILIRUBIN mg/dL 0.2  --  0.3   ALK PHOS U/L 130*  --  124*   ALT (SGPT) U/L 27  --  22   AST (SGOT) U/L 23  --  17   GLUCOSE mg/dL 167* 331* 151*       Estimated Creatinine Clearance: 39 mL/min (A) (by C-G formula based on SCr of 2.71 mg/dL (H)).    Results from last 7 days   Lab Units 12/08/23  0657   MAGNESIUM mg/dL 2.4   PHOSPHORUS mg/dL 3.9       Results from last 7 days   Lab Units 12/08/23  0657   URIC ACID mg/dL 5.7       Results from last 7 days   Lab Units 12/08/23  0657 12/07/23  0833 12/06/23  2132   WBC 10*3/mm3 20.64* 21.60* 24.82*   HEMOGLOBIN g/dL 8.3* 6.9* 7.7*   PLATELETS 10*3/mm3 466* 474* 435       Results from last 7 days   Lab Units 12/06/23  2132   INR  1.34*       Assessment / Plan     ASSESSMENT:  Acute kidney injury on chronic kidney disease, multifactorial in etiology, creatinine slightly better today than on admission, creatinine today 2.71, very stable, electrolyte within acceptable range  Chronic kidney disease stage III secondary to diabetic nephropathy baseline creatinine 1.6-1.8, patient has a proteinuria his protein to creatinine ratio was 2542 mg/g on 12/7/2023.  6 bronchiectasis pneumonia being evaluated by ID  Diabetes mellitus type 2 with renal complication  Diabetic neuropathy  Neurogenic bladder quiring self-catheterization currently has Doyle catheter anchored  in  Severe anemia, hemoglobin up to 8.3 after transfusion  Morbid obesity  Hypertension with chronic kidney disease, reasonably controlled  Volume excess improving    PLAN:  Continue the same treatment  Surveillance labs    I reviewed the chart and other providers notes, reviewed labs.  Discussed the case with the patient and he voiced good understanding  Copied text in this note has been reviewed and is accurate as of 12/08/23.         Thank you for involving us in the care of Preston Wallis.  Please feel free to call with any questions.    Buddy Ignacio MD  12/08/23  10:13 RUST    [facility]  [phone]    Please note that portions of this note were completed with a voice recognition program.

## 2023-12-08 NOTE — PROGRESS NOTES
Name: Presotn Wallis ADMIT: 2023   : 1965  PCP: Kimmy Riley MD    MRN: [id number] LOS: 2 days   AGE/SEX: 58 y.o. male  ROOM: [facility]     Subjective   Subjective   Feeling better today. Breathing is better. No cough. No SOA at rest. No N/V/D/abd pain. Tolerating diet and eating well. No F/C/NS. No CP or palp.       Objective   Objective   Vital Signs  Temp:  [97.3 °F (36.3 °C)-98.1 °F (36.7 °C)] 98.1 °F (36.7 °C)  Heart Rate:  [78-92] 88  Resp:  [16-20] 20  BP: (130-149)/(60-83) 141/65  SpO2:  [88 %-100 %] 95 %  on  Flow (L/min):  [1-2] 2;   Device (Oxygen Therapy): nasal cannula  Body mass index is 41.02 kg/m².  Physical Exam  Vitals and nursing note reviewed.   Constitutional:       General: He is not in acute distress.     Appearance: He is obese. He is ill-appearing (chronically). He is not toxic-appearing or diaphoretic.   HENT:      Head: Normocephalic.      Nose: Nose normal.      Mouth/Throat:      Mouth: Mucous membranes are moist.      Pharynx: Oropharynx is clear.   Eyes:      General: No scleral icterus.        Right eye: No discharge.         Left eye: No discharge.      Conjunctiva/sclera: Conjunctivae normal.   Cardiovascular:      Rate and Rhythm: Normal rate and regular rhythm.      Pulses: Normal pulses.   Pulmonary:      Effort: Pulmonary effort is normal. No respiratory distress.      Breath sounds: Rhonchi (coarse, bilateral) present. No wheezing or rales.   Abdominal:      General: Bowel sounds are normal. There is no distension.      Palpations: Abdomen is soft.      Tenderness: There is no abdominal tenderness.   Genitourinary:     Comments: Clear yellow urine in lopez bag  Musculoskeletal:         General: Swelling present. Normal range of motion.      Cervical back: Neck supple.   Skin:     General: Skin is warm and dry.      Capillary Refill: Capillary refill takes less than 2 seconds.      Coloration: Skin is pale. Skin is not jaundiced.   Neurological:      General:  No focal deficit present.      Mental Status: He is alert and oriented to person, place, and time. Mental status is at baseline.      Cranial Nerves: No cranial nerve deficit.      Coordination: Coordination normal.   Psychiatric:         Mood and Affect: Mood normal.         Behavior: Behavior normal.         Thought Content: Thought content normal.       Results Review     I reviewed the patient's new clinical results.  Results from last 7 days   Lab Units 12/08/23 0657 12/07/23  0833 12/06/23  2132   WBC 10*3/mm3 20.64* 21.60* 24.82*   HEMOGLOBIN g/dL 8.3* 6.9* 7.7*   PLATELETS 10*3/mm3 466* 474* 435     Results from last 7 days   Lab Units 12/08/23 0657 12/07/23  0833 12/06/23  2132   SODIUM mmol/L 141 134* 138   POTASSIUM mmol/L 4.2 4.3 4.0   CHLORIDE mmol/L 102 100 101   CO2 mmol/L 27.0 21.7* 24.2   BUN mg/dL 35* 46* 48*   CREATININE mg/dL 2.71* 2.79* 3.09*   GLUCOSE mg/dL 167* 331* 151*   EGFR mL/min/1.73 26.4* 25.5* 22.5*     Results from last 7 days   Lab Units 12/08/23 0657 12/06/23  2132   ALBUMIN g/dL 3.1* 2.8*   BILIRUBIN mg/dL 0.2 0.3   ALK PHOS U/L 130* 124*   AST (SGOT) U/L 23 17   ALT (SGPT) U/L 27 22     Results from last 7 days   Lab Units 12/08/23 0657 12/07/23  0833 12/06/23  2132   CALCIUM mg/dL 9.0 8.4* 8.5*   ALBUMIN g/dL 3.1*  --  2.8*   MAGNESIUM mg/dL 2.4  --   --    PHOSPHORUS mg/dL 3.9  --   --      Results from last 7 days   Lab Units 12/06/23 2132 12/04/23  0842   PROCALCITONIN ng/mL 1.33* 2.98*   LACTATE mmol/L 1.3  --      Glucose   Date/Time Value Ref Range Status   12/08/2023 1059 237 (H) 70 - 130 mg/dL Final   12/08/2023 0628 149 (H) 70 - 130 mg/dL Final   12/07/2023 2026 279 (H) 70 - 130 mg/dL Final   12/07/2023 1600 288 (H) 70 - 130 mg/dL Final   12/07/2023 1115 338 (H) 70 - 130 mg/dL Final   12/07/2023 0646 381 (H) 70 - 130 mg/dL Final   12/06/2023 2145 192 (H) 70 - 130 mg/dL Final       XR Chest PA & Lateral    Result Date: 12/8/2023  Unchanged at least partial right  upper lobe collapse with right-sided volume loss and rightward mediastinal shift. Associated widening of the right paratracheal stripe. Very similar bilateral right greater than left parenchymal opacities. Right lower lobe opacity silhouettes the shifted right heart border. Consider updated chest CT. Right subclavian port with tip overlying the mid SVC. Overlapping left subclavian central venous catheter tubing. No pleural effusion or pneumothorax. Stable normal size rightward shifted cardiomediastinal silhouette.  This report was finalized on 12/8/2023 12:28 PM by Dr. Pawan Mcallister M.D on Workstation: [id number]      XR Chest 1 View    Result Date: 12/7/2023  Unchanged right subclavian port with tip overlying the mid SVC. Overlapping central venous tubing extending from the left subclavian vein to the mid SVC, possibly abandoned. Patient is rotated. Unchanged at least partial right upper lobe atelectasis and bilateral, right greater than left parenchymal opacities. No pleural effusion or pneumothorax.  This report was finalized on 12/7/2023 12:29 AM by Dr. Pawan Mcallister M.D on Workstation: [id number]       I have personally reviewed all medications:  Scheduled Medications  atorvastatin, 20 mg, Oral, Nightly  budesonide-formoterol, 2 puff, Inhalation, BID - RT  docusate sodium, 100 mg, Oral, Daily  famotidine, 40 mg, Oral, QAM  finasteride, 5 mg, Oral, Daily  gabapentin, 300 mg, Oral, Q8H  hydrocortisone-bacitracin-zinc oxide-nystatin, 1 application , Topical, Q12H  insulin glargine, 30 Units, Subcutaneous, Nightly  insulin lispro, 3-14 Units, Subcutaneous, 4x Daily AC & at Bedtime  ipratropium-albuterol, 3 mL, Nebulization, Q4H - RT  metoprolol tartrate, 100 mg, Oral, BID  montelukast, 10 mg, Oral, Daily  mupirocin, 1 application , Topical, Q12H  piperacillin-tazobactam, 4.5 g, Intravenous, Q8H  senna-docusate sodium, 2 tablet, Oral, BID  sodium chloride, 4 mL, Nebulization, BID - RT    Infusions   Diet  Diet:  Cardiac Diets, Diabetic Diets; Healthy Heart (2-3 Na+); Consistent Carbohydrate; Texture: Regular Texture (IDDSI 7); Fluid Consistency: Thin (IDDSI 0)    I have personally reviewed:  [x]  Laboratory   [x]  Microbiology   [x]  Radiology   [x]  EKG/Telemetry  []  Cardiology/Vascular   []  Pathology    []  Records       Assessment/Plan     Active Hospital Problems    Diagnosis  POA    **Bacterial pneumonia [J15.9]  Yes    Bronchiectasis [J47.9]  Yes    Anemia [D64.9]  Yes    Sepsis [A41.9]  Yes    Essential hypertension [I10]  Yes    Chronic obstructive pulmonary disease [J44.9]  Yes    Stage 3b chronic kidney disease [N18.32]  Yes    Neurogenic bladder [N31.9]  Yes    Hyperlipidemia [E78.5]  Yes    Paroxysmal atrial fibrillation [I48.0]  Yes    Obstructive sleep apnea [G47.33]  Yes    Chronic diastolic heart failure [I50.32]  Yes    ERIC (acute kidney injury) [N17.9]  Yes      Resolved Hospital Problems   No resolved problems to display.       59yo gentleman, former smoker, with COPD, chronic bronchiectasis, CHRF, MILADY, chronic diastolic CHF, CKD3b, DM2, HTN, HLD, PAF (Eliquis), neurogenic bladder, morbid obesity, and prior h/o both MRSA PNA and Pseudomonas PNA, who was admitted to [facility] on 11/28 for RLL PNA. He was initially treated with Vanc/Cefepime and MRSA screen was positive. Sputum culture grew Achromobacter xylosoxidans on 12/3 and he was changed to Zosyn/Bactrim. Despite this he had worsening of O2 requirements and persistent leukocytosis. Renal function worsened so Bactrim was changed to Zyvox and arrangements were made to transfer him to [facility] so that he could evaluated by Infectious Disease, Pulm, and Renal services.     RLL PNA (MRSA and Achromobacter)  Acute on chronic hypoxic resp failure  Pulm and ID following  Afebrile and VSS, stable on 2L/min (baseline)  ID has changed Zyvox to Zosyn and ordered repeat cultures  Recommending abx through 12/14     COPD  Chronic  bronchiectasis  Appreciate Pulm attention to pt  Supplemental O2 stable at 2L/min  Treating PNA  Scheduled DuoNebs and Symbicort     ERIC/CKD3b  Appreciate Renal attention to pt  Cr sl better today     Anemia NOS  Hgb improved after a unit of PRBCs 12/7  Check iron panel and vitamin levels     Abd wall cellulitis/abscess  [facility]  Had some bleeding after--starting SQ Heparin for DVT ppx today, will watch for recurrence of bleeding     PAF (Eliquis)  NSR on EKG here  HRs acceptable on metoprolol and BPs tolerating  Eliquis on hold due to bleeding from abd wall I&D site (now resolved)--consider restarting AC      Chronic diastolic CHF  Holding Bumex due to ERIC  Defer volume status to Renal for now     DM2  A1c 8.4  Continue Lantus/SSI and monitor sugars closely  Holding Bydureon and Farxiga     HTN  BPs acceptable  Continue metoprolol     Neurogenic bladder  Chronic urinary retention  Pt self-caths 4 times/day normally  Doyle cath in place for now     Morbid obesity  Complicating all aspects of care     Bedbug infestation    Heparin SC for DVT prophylaxis.  Full code.  Discussed with patient, nursing staff, CCP, and care team on multidisciplinary rounds.  Anticipate discharge TBD        Iam Eller MD  [facility]  12/08/23  14:00 EST

## 2023-12-08 NOTE — THERAPY TREATMENT NOTE
Patient Name: Preston Wallis  : 1965    MRN: [id number]                              Today's Date: 2023       Admit Date: 2023    Visit Dx: No diagnosis found.  Patient Active Problem List   Diagnosis    Polyneuropathy    Paroxysmal atrial fibrillation    Obstructive sleep apnea    MRSA pneumonia    Low back pain    Chronic diastolic heart failure    Allergies    COPD exacerbation    Chronic anticoagulation    Benign prostatic hyperplasia    Impaired mobility and endurance    Stage 3a chronic kidney disease    Iron deficiency anemia secondary to inadequate dietary iron intake    Vitamin D deficiency    Class 3 severe obesity with serious comorbidity in adult    Lower extremity edema    Elevated alkaline phosphatase level    Venous insufficiency (chronic) (peripheral)    Tobacco abuse, in remission    History of Pseudomonas pneumonia    Chronic dyspnea    Gastroesophageal reflux disease    Bronchiectasis without complication    ERIC (acute kidney injury)    Altered mental status    Hyperlipidemia    Luetscher's syndrome    Neurogenic bladder    Class 1 obesity    Pneumonia due to Pseudomonas species    Seizures    Primary osteoarthritis of left knee    Other constipation    Chronic obstructive pulmonary disease    Type 2 DM with CKD stage 3 and hypertension    Essential hypertension    Stage 3b chronic kidney disease    Annual physical exam    Long-term use of high-risk medication    Personal history of PE (pulmonary embolism)    Encounter for aftercare for healing closed traumatic fracture of left femur    Chronic pain of left knee    Primary osteoarthritis of right knee    Sepsis    Bronchiectasis    Bacterial pneumonia    Anemia     Past Medical History:   Diagnosis Date    Age-related cognitive decline     Allergic contact dermatitis     Allergies     Anemia     Bronchiectasis with acute lower respiratory infection     Charcot foot due to diabetes mellitus 9/10/2013    Chronic diastolic  (congestive) heart failure     Chronic kidney disease     Chronic respiratory failure with hypoxia     Closed supracondylar fracture of femur 1/12/2022    COPD (chronic obstructive pulmonary disease)     Deep vein thrombosis (DVT) of lower extremity associated with air travel 1/13/2023    Dependence on supplemental oxygen     Eczema     Erectile dysfunction     due to organic reasons    Essential (primary) hypertension     Fracture     closed fracture of other tarsal and metatarsal bones    Fracture of proximal humerus 1/13/2023    GERD without esophagitis     High risk medication use     Hypercholesteremia     Hypomagnesemia     Infected stasis ulcer of left lower extremity 1/13/2023    Insomnia     Low back pain     Major depressive disorder     Morbid (severe) obesity due to excess calories     MRSA pneumonia     Muscle weakness     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 without evidence of necrosis     Obstructive sleep apnea (adult) (pediatric)     Other forms of dyspnea     Other long term (current) drug therapy     Other specified noninfective gastroenteritis and colitis     Other spondylosis, lumbar region     Pain in both knees     Paroxysmal atrial fibrillation     Peripheral neuropathy     attributed to type 2 diabetes    Pneumonia, unspecified organism     Polyneuropathy     Rash and other nonspecific skin eruption     Syncope and collapse     Tachycardia     Tinnitus 1/13/2023    Type 1 diabetes mellitus with diabetic chronic kidney disease     Type 2 diabetes mellitus     Unspecified fall, initial encounter     Urinary retention      Past Surgical History:   Procedure Laterality Date    CHOLECYSTECTOMY      CYSTOSCOPY      FEMUR SURGERY Left     Shravan placed    KNEE SURGERY Left     OTHER SURGICAL HISTORY Left     venous port    TONSILLECTOMY AND ADENOIDECTOMY        General Information       Row Name 12/08/23 1321          OT Time and Intention    Document  Type therapy note (daily note)  -     Mode of Treatment occupational therapy;individual therapy  -       Row Name 12/08/23 1321          General Information    Patient Profile Reviewed yes  -     Existing Precautions/Restrictions fall;oxygen therapy device and L/min  2L  -       Row Name 12/08/23 1321          Cognition    Orientation Status (Cognition) oriented x 4  -       Row Name 12/08/23 1321          Safety Issues, Functional Mobility    Safety Issues Affecting Function (Mobility) insight into deficits/self-awareness  -     Impairments Affecting Function (Mobility) balance;endurance/activity tolerance;strength;shortness of breath  -               User Key  (r) = Recorded By, (t) = Taken By, (c) = Cosigned By      Initials Name Provider Type     Grecia Shelby OT Occupational Therapist                     Mobility/ADL's       Row Name 12/08/23 1322          Bed Mobility    Supine-Sit Tift (Bed Mobility) standby assist  -     Assistive Device (Bed Mobility) head of bed elevated  -       Row Name 12/08/23 1322          Transfers    Transfers bed-chair transfer  -     Comment, (Transfers) HHAX1, pt reports doesnt use AD at baseline but may benefit from rwx 2/2 new onset weakness  -       Row Name 12/08/23 1322          Bed-Chair Transfer    Bed-Chair Tift (Transfers) moderate assist (50% patient effort);1 person assist  -       Row Name 12/08/23 1322          Sit-Stand Transfer    Sit-Stand Tift (Transfers) moderate assist (50% patient effort);1 person assist  -       Row Name 12/08/23 1322          Activities of Daily Living    BADL Assessment/Intervention lower body dressing  pt declines ADLs today, fatiqued  -       Row Name 12/08/23 1322          Lower Body Dressing Assessment/Training    Tift Level (Lower Body Dressing) don;socks;dependent (less than 25% patient effort)  -     Position (Lower Body Dressing) edge of bed sitting  -                User Key  (r) = Recorded By, (t) = Taken By, (c) = Cosigned By      Initials Name Provider Type    SM Grecia Shelby OT Occupational Therapist                   Obj/Interventions       Row Name 12/08/23 1323          Balance    Static Sitting Balance supervision  -SM     Static Standing Balance minimal assist  -SM     Dynamic Standing Balance moderate assist  -SM     Position/Device Used, Standing Balance supported;walker, rolling  -SM               User Key  (r) = Recorded By, (t) = Taken By, (c) = Cosigned By      Initials Name Provider Type    SM Grecia Shelby OT Occupational Therapist                   Goals/Plan    No documentation.                  Clinical Impression       Row Name 12/08/23 1323          Pain Assessment    Pretreatment Pain Rating 0/10 - no pain  -SM     Posttreatment Pain Rating 0/10 - no pain  -SM       Row Name 12/08/23 1323          Plan of Care Review    Plan of Care Reviewed With patient  -     Progress no change  -     Outcome Evaluation Pt is able to transfer to the chair today with mod A from OT and cues for technique. Pt then declines participation in any additional transfers, ADLs, exercise due to fatique after pivoting to the chair. OT discussed dc recommendation to SNF but pt declines reporting he will go home with HH, family, and DME. OT does have concerns with pt current weakness, endurance, risk of falls. RN reports pt sat in chair most of the day yesterday and required max AX2 from [facility] to transfer back to the bed.  -       Row Name 12/08/23 1323          Therapy Plan Review/Discharge Plan (OT)    Anticipated Discharge Disposition (OT) skilled nursing facility;home with home health;home with assist  -       Row Name 12/08/23 1323          Vital Signs    Pre SpO2 (%) 92  -SM     O2 Delivery Pre Treatment supplemental O2  -SM     Intra SpO2 (%) 96  -SM     O2 Delivery Intra Treatment supplemental O2  -SM     O2 Delivery Post Treatment supplemental O2  -SM        Row Name 12/08/23 1323          Positioning and Restraints    Pre-Treatment Position in bed  -SM     Post Treatment Position chair  -SM     In Chair reclined;call light within reach;encouraged to call for assist;exit alarm on;notified nsg  -               User Key  (r) = Recorded By, (t) = Taken By, (c) = Cosigned By      Initials Name Provider Type    Grecia Jacobs OT Occupational Therapist                   Outcome Measures       Row Name 12/08/23 1325          How much help from another is currently needed...    Putting on and taking off regular lower body clothing? 1  -SM     Bathing (including washing, rinsing, and drying) 2  -SM     Toileting (which includes using toilet bed pan or urinal) 1  -SM     Putting on and taking off regular upper body clothing 3  -SM     Taking care of personal grooming (such as brushing teeth) 3  -SM     Eating meals 4  -SM     AM-PAC 6 Clicks Score (OT) 14  -SM       Row Name 12/08/23 0815          How much help from another person do you currently need...    Turning from your back to your side while in flat bed without using bedrails? 3  -KE     Moving from lying on back to sitting on the side of a flat bed without bedrails? 3  -KE     Moving to and from a bed to a chair (including a wheelchair)? 2  -KE     Standing up from a chair using your arms (e.g., wheelchair, bedside chair)? 2  -KE     Climbing 3-5 steps with a railing? 1  -KE     To walk in hospital room? 1  -KE     AM-PAC 6 Clicks Score (PT) 12  -KE     Highest Level of Mobility Goal 4 --> Transfer to chair/commode  -KE       Row Name 12/08/23 1325          Functional Assessment    Outcome Measure Options AM-PAC 6 Clicks Daily Activity (OT)  -SM               User Key  (r) = Recorded By, (t) = Taken By, (c) = Cosigned By      Initials Name Provider Type    Grecia Jacobs OT Occupational Therapist    Oliva Lombardi, RN Registered Nurse                    Occupational Therapy Education        Title: PT OT SLP Therapies (In Progress)       Topic: Occupational Therapy (In Progress)       Point: ADL training (Done)       Description:   Instruct learner(s) on proper safety adaptation and remediation techniques during self care or transfers.   Instruct in proper use of assistive devices.                  Learning Progress Summary             Patient Acceptance, E, VU by  at 12/7/2023 1210    Comment: OT goals, dc planning, DME for ADLs                         Point: Home exercise program (Not Started)       Description:   Instruct learner(s) on appropriate technique for monitoring, assisting and/or progressing therapeutic exercises/activities.                  Learner Progress:  Not documented in this visit.              Point: Precautions (Not Started)       Description:   Instruct learner(s) on prescribed precautions during self-care and functional transfers.                  Learner Progress:  Not documented in this visit.              Point: Body mechanics (Not Started)       Description:   Instruct learner(s) on proper positioning and spine alignment during self-care, functional mobility activities and/or exercises.                  Learner Progress:  Not documented in this visit.                              User Key       Initials Effective Dates Name Provider Type Discipline     04/02/20 -  Grecia Shelby OT Occupational Therapist OT                  OT Recommendation and Plan  Planned Therapy Interventions (OT): activity tolerance training, adaptive equipment training, functional balance retraining, occupation/activity based interventions, patient/caregiver education/training, transfer/mobility retraining, strengthening exercise, ROM/therapeutic exercise  Therapy Frequency (OT): 3 times/wk  Plan of Care Review  Plan of Care Reviewed With: patient  Progress: no change  Outcome Evaluation: Pt is able to transfer to the chair today with mod A from OT and cues for technique. Pt then declines  participation in any additional transfers, ADLs, exercise due to fatique after pivoting to the chair. OT discussed dc recommendation to SNF but pt declines reporting he will go home with HH, family, and DME. OT does have concerns with pt current weakness, endurance, risk of falls. RN reports pt sat in chair most of the day yesterday and required max AX2 from ns to transfer back to the bed.     Time Calculation:   Evaluation Complexity (OT)  Review Occupational Profile/Medical/Therapy History Complexity: expanded/moderate complexity  Assessment, Occupational Performance/Identification of Deficit Complexity: 3-5 performance deficits  Clinical Decision Making Complexity (OT): detailed assessment/moderate complexity  Overall Complexity of Evaluation (OT): moderate complexity     Time Calculation- OT       Row Name 12/08/23 1326             Time Calculation- OT    OT Start Time 1300  -SM      OT Stop Time 1312  -SM      OT Time Calculation (min) 12 min  -SM      Total Timed Code Minutes- OT 12 minute(s)  -SM      OT Received On 12/08/23  -      OT - Next Appointment 12/11/23  -         Timed Charges    57429 - OT Therapeutic Activity Minutes 12  -SM         Total Minutes    Timed Charges Total Minutes 12  -SM       Total Minutes 12  -SM                User Key  (r) = Recorded By, (t) = Taken By, (c) = Cosigned By      Initials Name Provider Type     Grecia Shelby OT Occupational Therapist                  Therapy Charges for Today       Code Description Service Date Service Provider Modifiers Qty    83806266739 HC OT SELF CARE/MGMT/TRAIN EA 15 MIN 12/7/2023 Grecia Shelby OT GO 1    18380101729  OT EVAL MOD COMPLEXITY 3 12/7/2023 Grecia Shelby OT GO 1    49159872980 HC OT THERAPEUTIC ACT EA 15 MIN 12/8/2023 Grecia Shelby OT GO 1                 Grecia Shelby OT  12/8/2023

## 2023-12-08 NOTE — CASE MANAGEMENT/SOCIAL WORK
Discharge Planning Assessment  [facility]     Patient Name: Preston Wallis  MRN: [id number]  Today's Date: 12/8/2023    Admit Date: 12/6/2023    Plan: Home with family and [facility]  (current)   Discharge Needs Assessment       Row Name 12/08/23 1450       Living Environment    People in Home child(chon), adult;spouse    Current Living Arrangements home    Potentially Unsafe Housing Conditions none    Primary Care Provided by self    Provides Primary Care For no one    Family Caregiver if Needed spouse;child(chon), adult    Quality of Family Relationships helpful;involved;supportive    Able to Return to Prior Arrangements yes       Resource/Environmental Concerns    Resource/Environmental Concerns none    Transportation Concerns none       Transition Planning    Patient/Family Anticipates Transition to home with family    Patient/Family Anticipated Services at Transition none    Transportation Anticipated family or friend will provide       Discharge Needs Assessment    Readmission Within the Last 30 Days no previous admission in last 30 days    Current Outpatient/Agency/Support Group homecare agency    Equipment Currently Used at Home glucometer;shower chair;walker, rolling;wheelchair    Concerns to be Addressed no discharge needs identified;denies needs/concerns at this time    Anticipated Changes Related to Illness none    Equipment Needed After Discharge none    Provided Post Acute Provider List? N/A    Provided Post Acute Provider Quality & Resource List? N/A                   Discharge Plan       Row Name 12/08/23 1451       Plan    Plan Home with family and [facility] HH (current)    Patient/Family in Agreement with Plan yes    Plan Comments CCP met with the patient at bedside and confirmed the information on his face sheet was accurate. The patient states that he lives at home with his wife and his 2 adult sons ages 24 and 37. Patient states his 37-year-old son is a paraplegic. The patient states he is currently working  with [facility]. The patient states he has been to a SNF in [place] and will not go back; he did not remember the name of the SNF. He states his pharmacy is [facility] in [place], [place]. The patient states he has a continuous glucose monitor, wheelchair, a shower chair, and a walker. He states there is a ramp to get in/out of his home. He denies any steps inside his home. The patient states his wife can transport him at discharge. CCP to follow for needs. CD, SHABANAW.                  Continued Care and Services - Admitted Since 12/6/2023    Coordination has not been started for this encounter.       Selected Continued Care - Episodes Includes continued care and service providers with selected services from the active episodes listed below      Chronic Care Management Episode start date: 6/9/2022   There are no active outsourced providers for this episode.                 Expected Discharge Date and Time       Expected Discharge Date Expected Discharge Time    Dec 12, 2023            Demographic Summary       Row Name 12/08/23 1444       General Information    Admission Type inpatient    Arrived From hospital    Required Notices Provided Important Message from Medicare    Referral Source admission list    Reason for Consult discharge planning    Preferred Language English                   Functional Status       Row Name 12/08/23 1449       Functional Status    Usual Activity Tolerance moderate    Current Activity Tolerance moderate       Functional Status, IADL    Medications assistive equipment    Meal Preparation assistive equipment    Housekeeping assistive equipment    Laundry assistive equipment    Shopping assistive equipment       Mental Status    General Appearance WDL WDL       Mental Status Summary    Recent Changes in Mental Status/Cognitive Functioning no changes       Employment/    Employment Status disabled                   Psychosocial    No documentation.                   Abuse/Neglect    No documentation.                  Legal    No documentation.                  Substance Abuse    No documentation.                  Patient Forms    No documentation.

## 2023-12-08 NOTE — PLAN OF CARE
Goal Outcome Evaluation:  Patient slept well overnight. Remains on Zosyn. On 2L NC, baseline O2 requirement at home. A&Ox4. Heel boots in place. VSS. Sputum culture sent. All needs met.

## 2023-12-08 NOTE — OUTREACH NOTE
Call Center TCM Note      Flowsheet Row Responses   [facility] patient discharged from? [facility]   Does the patient have one of the following disease processes/diagnoses(primary or secondary)? Sepsis   TCM attempt successful? No   Unsuccessful attempts Attempt 1   Change in Health Status Readmitted            Karen Arechiga RN    12/8/2023, 06:21 EST

## 2023-12-09 ENCOUNTER — APPOINTMENT (OUTPATIENT)
Dept: CT IMAGING | Facility: HOSPITAL | Age: 58
DRG: 853 | End: 2023-12-09
Payer: COMMERCIAL

## 2023-12-09 LAB
ALBUMIN SERPL-MCNC: 3.1 G/DL (ref 3.5–5.2)
ALBUMIN/GLOB SERPL: 0.8 G/DL
ALP SERPL-CCNC: 132 U/L (ref 39–117)
ALT SERPL W P-5'-P-CCNC: 27 U/L (ref 1–41)
ANION GAP SERPL CALCULATED.3IONS-SCNC: 12.3 MMOL/L (ref 5–15)
AST SERPL-CCNC: 21 U/L (ref 1–40)
BASOPHILS # BLD AUTO: 0.02 10*3/MM3 (ref 0–0.2)
BASOPHILS NFR BLD AUTO: 0.1 % (ref 0–1.5)
BILIRUB SERPL-MCNC: 0.2 MG/DL (ref 0–1.2)
BUN SERPL-MCNC: 26 MG/DL (ref 6–20)
BUN/CREAT SERPL: 11 (ref 7–25)
CALCIUM SPEC-SCNC: 9 MG/DL (ref 8.6–10.5)
CHLORIDE SERPL-SCNC: 98 MMOL/L (ref 98–107)
CO2 SERPL-SCNC: 26.7 MMOL/L (ref 22–29)
CREAT SERPL-MCNC: 2.36 MG/DL (ref 0.76–1.27)
DEPRECATED RDW RBC AUTO: 41.1 FL (ref 37–54)
EGFRCR SERPLBLD CKD-EPI 2021: 31.1 ML/MIN/1.73
EOSINOPHIL # BLD AUTO: 0.67 10*3/MM3 (ref 0–0.4)
EOSINOPHIL NFR BLD AUTO: 3.8 % (ref 0.3–6.2)
ERYTHROCYTE [DISTWIDTH] IN BLOOD BY AUTOMATED COUNT: 13.2 % (ref 12.3–15.4)
FERRITIN SERPL-MCNC: 245 NG/ML (ref 30–400)
FOLATE SERPL-MCNC: >20 NG/ML (ref 4.78–24.2)
GLOBULIN UR ELPH-MCNC: 3.7 GM/DL
GLUCOSE BLDC GLUCOMTR-MCNC: 259 MG/DL (ref 70–130)
GLUCOSE BLDC GLUCOMTR-MCNC: 269 MG/DL (ref 70–130)
GLUCOSE BLDC GLUCOMTR-MCNC: 303 MG/DL (ref 70–130)
GLUCOSE BLDC GLUCOMTR-MCNC: 349 MG/DL (ref 70–130)
GLUCOSE SERPL-MCNC: 252 MG/DL (ref 65–99)
HCT VFR BLD AUTO: 25.4 % (ref 37.5–51)
HGB BLD-MCNC: 8.1 G/DL (ref 13–17.7)
IMM GRANULOCYTES # BLD AUTO: 0.21 10*3/MM3 (ref 0–0.05)
IMM GRANULOCYTES NFR BLD AUTO: 1.2 % (ref 0–0.5)
IRON 24H UR-MRATE: 29 MCG/DL (ref 59–158)
IRON SATN MFR SERPL: 14 % (ref 20–50)
LYMPHOCYTES # BLD AUTO: 2.37 10*3/MM3 (ref 0.7–3.1)
LYMPHOCYTES NFR BLD AUTO: 13.4 % (ref 19.6–45.3)
MAGNESIUM SERPL-MCNC: 2.1 MG/DL (ref 1.6–2.6)
MCH RBC QN AUTO: 27.8 PG (ref 26.6–33)
MCHC RBC AUTO-ENTMCNC: 31.9 G/DL (ref 31.5–35.7)
MCV RBC AUTO: 87.3 FL (ref 79–97)
MONOCYTES # BLD AUTO: 1.33 10*3/MM3 (ref 0.1–0.9)
MONOCYTES NFR BLD AUTO: 7.5 % (ref 5–12)
NEUTROPHILS NFR BLD AUTO: 13.11 10*3/MM3 (ref 1.7–7)
NEUTROPHILS NFR BLD AUTO: 74 % (ref 42.7–76)
NRBC BLD AUTO-RTO: 0 /100 WBC (ref 0–0.2)
PHOSPHATE SERPL-MCNC: 3.1 MG/DL (ref 2.5–4.5)
PLATELET # BLD AUTO: 463 10*3/MM3 (ref 140–450)
PMV BLD AUTO: 8.5 FL (ref 6–12)
POTASSIUM SERPL-SCNC: 4.2 MMOL/L (ref 3.5–5.2)
PROT SERPL-MCNC: 6.8 G/DL (ref 6–8.5)
RBC # BLD AUTO: 2.91 10*6/MM3 (ref 4.14–5.8)
SODIUM SERPL-SCNC: 137 MMOL/L (ref 136–145)
TIBC SERPL-MCNC: 207 MCG/DL (ref 298–536)
TRANSFERRIN SERPL-MCNC: 139 MG/DL (ref 200–360)
URATE SERPL-MCNC: 4.7 MG/DL (ref 3.4–7)
VIT B12 BLD-MCNC: 521 PG/ML (ref 211–946)
WBC NRBC COR # BLD AUTO: 17.71 10*3/MM3 (ref 3.4–10.8)

## 2023-12-09 PROCEDURE — 63710000001 INSULIN LISPRO (HUMAN) PER 5 UNITS: Performed by: HOSPITALIST

## 2023-12-09 PROCEDURE — 84550 ASSAY OF BLOOD/URIC ACID: CPT | Performed by: INTERNAL MEDICINE

## 2023-12-09 PROCEDURE — 82948 REAGENT STRIP/BLOOD GLUCOSE: CPT

## 2023-12-09 PROCEDURE — 82607 VITAMIN B-12: CPT | Performed by: HOSPITALIST

## 2023-12-09 PROCEDURE — 63710000001 INSULIN GLARGINE PER 5 UNITS: Performed by: HOSPITALIST

## 2023-12-09 PROCEDURE — 25010000002 HEPARIN (PORCINE) PER 1000 UNITS: Performed by: HOSPITALIST

## 2023-12-09 PROCEDURE — 83735 ASSAY OF MAGNESIUM: CPT | Performed by: INTERNAL MEDICINE

## 2023-12-09 PROCEDURE — 82746 ASSAY OF FOLIC ACID SERUM: CPT | Performed by: HOSPITALIST

## 2023-12-09 PROCEDURE — 83540 ASSAY OF IRON: CPT | Performed by: HOSPITALIST

## 2023-12-09 PROCEDURE — 94799 UNLISTED PULMONARY SVC/PX: CPT

## 2023-12-09 PROCEDURE — 84466 ASSAY OF TRANSFERRIN: CPT | Performed by: HOSPITALIST

## 2023-12-09 PROCEDURE — 25010000002 PIPERACILLIN SOD-TAZOBACTAM PER 1 G: Performed by: STUDENT IN AN ORGANIZED HEALTH CARE EDUCATION/TRAINING PROGRAM

## 2023-12-09 PROCEDURE — 71250 CT THORAX DX C-: CPT

## 2023-12-09 PROCEDURE — 94664 DEMO&/EVAL PT USE INHALER: CPT

## 2023-12-09 PROCEDURE — 82728 ASSAY OF FERRITIN: CPT | Performed by: HOSPITALIST

## 2023-12-09 PROCEDURE — 94761 N-INVAS EAR/PLS OXIMETRY MLT: CPT

## 2023-12-09 PROCEDURE — 84100 ASSAY OF PHOSPHORUS: CPT | Performed by: HOSPITALIST

## 2023-12-09 PROCEDURE — 85025 COMPLETE CBC W/AUTO DIFF WBC: CPT | Performed by: INTERNAL MEDICINE

## 2023-12-09 PROCEDURE — 80053 COMPREHEN METABOLIC PANEL: CPT | Performed by: HOSPITALIST

## 2023-12-09 RX ADMIN — FAMOTIDINE 40 MG: 20 TABLET ORAL at 06:08

## 2023-12-09 RX ADMIN — PIPERACILLIN SODIUM AND TAZOBACTAM SODIUM 4.5 G: 4; .5 INJECTION, SOLUTION INTRAVENOUS at 06:16

## 2023-12-09 RX ADMIN — MUPIROCIN 1 APPLICATION: 20 OINTMENT TOPICAL at 20:30

## 2023-12-09 RX ADMIN — IPRATROPIUM BROMIDE AND ALBUTEROL SULFATE 3 ML: 2.5; .5 SOLUTION RESPIRATORY (INHALATION) at 07:38

## 2023-12-09 RX ADMIN — METOPROLOL TARTRATE 100 MG: 50 TABLET, FILM COATED ORAL at 08:14

## 2023-12-09 RX ADMIN — ZINC OXIDE 1 APPLICATION: 200 OINTMENT TOPICAL at 09:50

## 2023-12-09 RX ADMIN — ATORVASTATIN CALCIUM 20 MG: 20 TABLET, FILM COATED ORAL at 20:30

## 2023-12-09 RX ADMIN — INSULIN LISPRO 8 UNITS: 100 INJECTION, SOLUTION INTRAVENOUS; SUBCUTANEOUS at 08:14

## 2023-12-09 RX ADMIN — MONTELUKAST SODIUM 10 MG: 10 TABLET, FILM COATED ORAL at 08:14

## 2023-12-09 RX ADMIN — IPRATROPIUM BROMIDE AND ALBUTEROL SULFATE 3 ML: 2.5; .5 SOLUTION RESPIRATORY (INHALATION) at 15:09

## 2023-12-09 RX ADMIN — INSULIN LISPRO 8 UNITS: 100 INJECTION, SOLUTION INTRAVENOUS; SUBCUTANEOUS at 16:49

## 2023-12-09 RX ADMIN — BUDESONIDE AND FORMOTEROL FUMARATE DIHYDRATE 2 PUFF: 160; 4.5 AEROSOL RESPIRATORY (INHALATION) at 07:34

## 2023-12-09 RX ADMIN — HEPARIN SODIUM 5000 UNITS: 5000 INJECTION INTRAVENOUS; SUBCUTANEOUS at 21:03

## 2023-12-09 RX ADMIN — METOPROLOL TARTRATE 100 MG: 50 TABLET, FILM COATED ORAL at 20:30

## 2023-12-09 RX ADMIN — IPRATROPIUM BROMIDE AND ALBUTEROL SULFATE 3 ML: 2.5; .5 SOLUTION RESPIRATORY (INHALATION) at 11:34

## 2023-12-09 RX ADMIN — PIPERACILLIN SODIUM AND TAZOBACTAM SODIUM 4.5 G: 4; .5 INJECTION, SOLUTION INTRAVENOUS at 14:45

## 2023-12-09 RX ADMIN — INSULIN LISPRO 10 UNITS: 100 INJECTION, SOLUTION INTRAVENOUS; SUBCUTANEOUS at 20:31

## 2023-12-09 RX ADMIN — MUPIROCIN 1 APPLICATION: 20 OINTMENT TOPICAL at 00:17

## 2023-12-09 RX ADMIN — IPRATROPIUM BROMIDE AND ALBUTEROL SULFATE 3 ML: 2.5; .5 SOLUTION RESPIRATORY (INHALATION) at 19:03

## 2023-12-09 RX ADMIN — HEPARIN SODIUM 5000 UNITS: 5000 INJECTION INTRAVENOUS; SUBCUTANEOUS at 06:08

## 2023-12-09 RX ADMIN — MUPIROCIN 1 APPLICATION: 20 OINTMENT TOPICAL at 09:50

## 2023-12-09 RX ADMIN — INSULIN GLARGINE 35 UNITS: 100 INJECTION, SOLUTION SUBCUTANEOUS at 20:31

## 2023-12-09 RX ADMIN — GABAPENTIN 300 MG: 300 CAPSULE ORAL at 06:08

## 2023-12-09 RX ADMIN — HEPARIN SODIUM 5000 UNITS: 5000 INJECTION INTRAVENOUS; SUBCUTANEOUS at 14:39

## 2023-12-09 RX ADMIN — GABAPENTIN 300 MG: 300 CAPSULE ORAL at 21:03

## 2023-12-09 RX ADMIN — FINASTERIDE 5 MG: 5 TABLET, FILM COATED ORAL at 08:14

## 2023-12-09 RX ADMIN — PIPERACILLIN SODIUM AND TAZOBACTAM SODIUM 4.5 G: 4; .5 INJECTION, SOLUTION INTRAVENOUS at 00:17

## 2023-12-09 RX ADMIN — INSULIN LISPRO 10 UNITS: 100 INJECTION, SOLUTION INTRAVENOUS; SUBCUTANEOUS at 11:58

## 2023-12-09 RX ADMIN — GABAPENTIN 300 MG: 300 CAPSULE ORAL at 14:39

## 2023-12-09 RX ADMIN — Medication 4 ML: at 07:36

## 2023-12-09 RX ADMIN — ZINC OXIDE 1 APPLICATION: 200 OINTMENT TOPICAL at 20:30

## 2023-12-09 NOTE — PLAN OF CARE
Goal Outcome Evaluation:  Plan of Care Reviewed With: patient        Progress: no change   VSS. Pt up with assist x 1-2 to pivot from bed/chair/bsc. FC in place-care done. Abd drsg done at MN. No c/o pain. IV zosyn via port. Labs to be drawn this AM from port. Q2H turn. Accucheck ACHS. CT of chest ordered-to be done today.

## 2023-12-09 NOTE — PROGRESS NOTES
Name: Preston Wallis ADMIT: 2023   : 1965  PCP: Kimmy Riley MD    MRN: [id number] LOS: 3 days   AGE/SEX: 58 y.o. male  ROOM: [facility]     Subjective   Subjective   Feeling better again today. Breathing is better. No cough. No SOA at rest. No N/V/D/abd pain. Tolerating diet and eating well. No F/C/NS. No CP or palp.       Objective   Objective   Vital Signs  Temp:  [97.3 °F (36.3 °C)-98.4 °F (36.9 °C)] 98.4 °F (36.9 °C)  Heart Rate:  [73-97] 93  Resp:  [20-22] 20  BP: (141-167)/(65-77) 149/67  SpO2:  [89 %-100 %] 100 %  on  Flow (L/min):  [2] 2;   Device (Oxygen Therapy): nasal cannula  Body mass index is 41.02 kg/m².  Physical Exam  Vitals and nursing note reviewed.   Constitutional:       General: He is not in acute distress.     Appearance: He is obese. He is ill-appearing (chronically). He is not toxic-appearing or diaphoretic.   HENT:      Head: Normocephalic.      Nose: Nose normal.      Mouth/Throat:      Mouth: Mucous membranes are moist.      Pharynx: Oropharynx is clear.   Eyes:      General: No scleral icterus.        Right eye: No discharge.         Left eye: No discharge.      Conjunctiva/sclera: Conjunctivae normal.   Cardiovascular:      Rate and Rhythm: Normal rate and regular rhythm.      Pulses: Normal pulses.   Pulmonary:      Effort: Pulmonary effort is normal. No respiratory distress.      Breath sounds: Wheezing (very faint in upper fields) present. No rhonchi or rales.   Abdominal:      General: Bowel sounds are normal. There is no distension.      Palpations: Abdomen is soft.      Tenderness: There is no abdominal tenderness.   Genitourinary:     Comments: Clear yellow urine in lopez bag  Musculoskeletal:         General: Swelling present. Normal range of motion.      Cervical back: Neck supple.   Skin:     General: Skin is warm and dry.      Capillary Refill: Capillary refill takes less than 2 seconds.      Coloration: Skin is pale. Skin is not jaundiced.   Neurological:       General: No focal deficit present.      Mental Status: He is alert and oriented to person, place, and time. Mental status is at baseline.      Cranial Nerves: No cranial nerve deficit.      Coordination: Coordination normal.   Psychiatric:         Mood and Affect: Mood normal.         Behavior: Behavior normal.         Thought Content: Thought content normal.       Results Review     I reviewed the patient's new clinical results.  Results from last 7 days   Lab Units 12/09/23  0624 12/08/23  0657 12/07/23  0833 12/06/23  2132   WBC 10*3/mm3 17.71* 20.64* 21.60* 24.82*   HEMOGLOBIN g/dL 8.1* 8.3* 6.9* 7.7*   PLATELETS 10*3/mm3 463* 466* 474* 435     Results from last 7 days   Lab Units 12/09/23  0624 12/08/23  0657 12/07/23  0833 12/06/23  2132   SODIUM mmol/L 137 141 134* 138   POTASSIUM mmol/L 4.2 4.2 4.3 4.0   CHLORIDE mmol/L 98 102 100 101   CO2 mmol/L 26.7 27.0 21.7* 24.2   BUN mg/dL 26* 35* 46* 48*   CREATININE mg/dL 2.36* 2.71* 2.79* 3.09*   GLUCOSE mg/dL 252* 167* 331* 151*   EGFR mL/min/1.73 31.1* 26.4* 25.5* 22.5*     Results from last 7 days   Lab Units 12/09/23  0624 12/08/23  0657 12/06/23  2132   ALBUMIN g/dL 3.1* 3.1* 2.8*   BILIRUBIN mg/dL 0.2 0.2 0.3   ALK PHOS U/L 132* 130* 124*   AST (SGOT) U/L 21 23 17   ALT (SGPT) U/L 27 27 22     Results from last 7 days   Lab Units 12/09/23  0624 12/08/23  0657 12/07/23  0833 12/06/23  2132   CALCIUM mg/dL 9.0 9.0 8.4* 8.5*   ALBUMIN g/dL 3.1* 3.1*  --  2.8*   MAGNESIUM mg/dL 2.1 2.4  --   --    PHOSPHORUS mg/dL 3.1 3.9  --   --      Results from last 7 days   Lab Units 12/06/23  2132 12/04/23  0842   PROCALCITONIN ng/mL 1.33* 2.98*   LACTATE mmol/L 1.3  --      Glucose   Date/Time Value Ref Range Status   12/09/2023 0632 259 (H) 70 - 130 mg/dL Final   12/08/2023 2110 228 (H) 70 - 130 mg/dL Final   12/08/2023 1606 298 (H) 70 - 130 mg/dL Final   12/08/2023 1059 237 (H) 70 - 130 mg/dL Final   12/08/2023 0628 149 (H) 70 - 130 mg/dL Final   12/07/2023 2026 279  (H) 70 - 130 mg/dL Final   12/07/2023 1600 288 (H) 70 - 130 mg/dL Final       XR Chest PA & Lateral    Result Date: 12/8/2023  Unchanged at least partial right upper lobe collapse with right-sided volume loss and rightward mediastinal shift. Associated widening of the right paratracheal stripe. Very similar bilateral right greater than left parenchymal opacities. Right lower lobe opacity silhouettes the shifted right heart border. Consider updated chest CT. Right subclavian port with tip overlying the mid SVC. Overlapping left subclavian central venous catheter tubing. No pleural effusion or pneumothorax. Stable normal size rightward shifted cardiomediastinal silhouette.  This report was finalized on 12/8/2023 12:28 PM by Dr. Pawan Mcallister M.D on Workstation: [id number]       I have personally reviewed all medications:  Scheduled Medications  atorvastatin, 20 mg, Oral, Nightly  budesonide-formoterol, 2 puff, Inhalation, BID - RT  docusate sodium, 100 mg, Oral, Daily  famotidine, 40 mg, Oral, QAM  finasteride, 5 mg, Oral, Daily  gabapentin, 300 mg, Oral, Q8H  heparin (porcine), 5,000 Units, Subcutaneous, Q8H  hydrocortisone-bacitracin-zinc oxide-nystatin, 1 application , Topical, Q12H  insulin glargine, 30 Units, Subcutaneous, Nightly  insulin lispro, 3-14 Units, Subcutaneous, 4x Daily AC & at Bedtime  ipratropium-albuterol, 3 mL, Nebulization, Q4H - RT  metoprolol tartrate, 100 mg, Oral, BID  montelukast, 10 mg, Oral, Daily  mupirocin, 1 application , Topical, Q12H  piperacillin-tazobactam, 4.5 g, Intravenous, Q8H  senna-docusate sodium, 2 tablet, Oral, BID  sodium chloride, 4 mL, Nebulization, BID - RT    Infusions   Diet  Diet: Cardiac Diets, Diabetic Diets; Healthy Heart (2-3 Na+); Consistent Carbohydrate; Texture: Regular Texture (IDDSI 7); Fluid Consistency: Thin (IDDSI 0)    I have personally reviewed:  [x]  Laboratory   [x]  Microbiology   [x]  Radiology   [x]  EKG/Telemetry  []  Cardiology/Vascular   []   Pathology    []  Records       Assessment/Plan     Active Hospital Problems    Diagnosis  POA    **Bacterial pneumonia [J15.9]  Yes    Bronchiectasis [J47.9]  Yes    Anemia [D64.9]  Yes    Sepsis [A41.9]  Yes    Essential hypertension [I10]  Yes    Chronic obstructive pulmonary disease [J44.9]  Yes    Stage 3b chronic kidney disease [N18.32]  Yes    Neurogenic bladder [N31.9]  Yes    Hyperlipidemia [E78.5]  Yes    Paroxysmal atrial fibrillation [I48.0]  Yes    Obstructive sleep apnea [G47.33]  Yes    Chronic diastolic heart failure [I50.32]  Yes    ERIC (acute kidney injury) [N17.9]  Yes      Resolved Hospital Problems   No resolved problems to display.       57yo gentleman, former smoker, with COPD, chronic bronchiectasis, CHRF, MILADY, chronic diastolic CHF, CKD3b, DM2, HTN, HLD, PAF (Eliquis), neurogenic bladder, morbid obesity, and prior h/o both MRSA PNA and Pseudomonas PNA, who was admitted to [facility] on 11/28 for RLL PNA. He was initially treated with Vanc/Cefepime and MRSA screen was positive. Sputum culture grew Achromobacter xylosoxidans on 12/3 and he was changed to Zosyn/Bactrim. Despite this he had worsening of O2 requirements and persistent leukocytosis. Renal function worsened so Bactrim was changed to Zyvox and arrangements were made to transfer him to [facility] so that he could evaluated by Infectious Disease, Pulm, and Renal services.     RLL PNA (MRSA and Achromobacter)  Acute on chronic hypoxic resp failure  Pulm and ID following  Afebrile and VSS, stable on 2L/min (baseline)  WBC falling  ID has changed Zyvox to Zosyn and ordered repeat cultures  Recommending abx through 12/14  CT chest pending     COPD  Chronic bronchiectasis  Appreciate Pulm attention to pt  Supplemental O2 stable at 2L/min  Treating PNA  Scheduled DuoNebs and Symbicort     ERIC/CKD3b  Appreciate Renal attention to pt  Cr sl better again today     Anemia NOS  Hgb improved after a unit of PRBCs 12/7, continue to  monitor  Labs c/w chronic disease and mild iron def     Abd wall cellulitis/abscess  Resolved after I&D by Surg at [place]  Topical Mupirocin  Had some bleeding after  On SQ Heparin for DVT ppx, watch for recurrence of bleeding     PAF (Eliquis)  NSR on EKG here  HRs acceptable on metoprolol and BPs tolerating  Eliquis on hold due to bleeding from abd wall I&D site (now resolved)--consider restarting AC      Chronic diastolic CHF  Holding Bumex due to ERIC  Defer volume status to Renal for now     DM2  A1c 8.4  Continue Lantus/SSI   Sugars high  Increase Lantus dose tonight  Holding Bydureon and Farxiga     HTN  BPs acceptable if a bit robust at times  Continue metoprolol     Neurogenic bladder  Chronic urinary retention  Pt self-caths 4 times/day normally  Doyle cath in place for now     Morbid obesity  Complicating all aspects of care     Bedbug infestation    Heparin SC for DVT prophylaxis.  Full code.  Discussed with patient and family x 2.  Anticipate discharge TBD        Iam Eller MD  [facility]  12/09/23  10:43 EST

## 2023-12-09 NOTE — PROGRESS NOTES
[facility] INPATIENT PROGRESS NOTE         [address]    2023      PATIENT IDENTIFICATION:  Name: Perston Wallis ADMIT: 2023   : 1965  PCP: Kimmy Riley MD    MRN: [id number] LOS: 3 days   AGE/SEX: 58 y.o. male  ROOM: [facility]                     LOS 3    Reason for visit: Pneumonia and COPD exacerbation      SUBJECTIVE:      Less short of breath.  On 2 L supplemental oxygen with saturations 100%.    Objective   OBJECTIVE:    Vital Sign Min/Max for last 24 hours  Temp  Min: 97.3 °F (36.3 °C)  Max: 98.4 °F (36.9 °C)   BP  Min: 141/65  Max: 167/77   Pulse  Min: 73  Max: 97   Resp  Min: 20  Max: 22   SpO2  Min: 89 %  Max: 100 %   No data recorded   No data recorded    Vitals:    23 0734 23 0736 23 0738 23 0757   BP:    149/67   BP Location:    Left arm   Patient Position:    Lying   Pulse: 93 93 97 93   Resp: 20 20  20   Temp:    98.4 °F (36.9 °C)   TempSrc:    Oral   SpO2: 99% 99% 99% 100%   Weight:       Height:                23  1904   Weight: 126 kg (277 lb 12.5 oz)       Body mass index is 41.02 kg/m².                          Body mass index is 41.02 kg/m².    Intake/Output Summary (Last 24 hours) at 2023 0806  Last data filed at 2023 0614  Gross per 24 hour   Intake 960 ml   Output 3625 ml   Net -2665 ml         Exam:  GEN:  No distress, appears stated age  EYES:   PERRL, anicteric sclerae  ENT:    External ears/nose normal, OP clear  NECK:  No adenopathy, midline trachea  LUNGS: Normal chest on inspection, palpation and coarse right base and diminished breath sounds bilaterally on auscultation  CV:  Normal S1S2, without murmur  ABD:  Nontender, nondistended, no hepatosplenomegaly, +BS  EXT:  No edema.  No cyanosis or clubbing.  No mottling and normal cap refill.    Assessment     Scheduled meds:  atorvastatin, 20 mg, Oral, Nightly  budesonide-formoterol, 2 puff, Inhalation, BID - RT  docusate sodium, 100 mg, Oral,  Daily  famotidine, 40 mg, Oral, QAM  finasteride, 5 mg, Oral, Daily  gabapentin, 300 mg, Oral, Q8H  heparin (porcine), 5,000 Units, Subcutaneous, Q8H  hydrocortisone-bacitracin-zinc oxide-nystatin, 1 application , Topical, Q12H  insulin glargine, 30 Units, Subcutaneous, Nightly  insulin lispro, 3-14 Units, Subcutaneous, 4x Daily AC & at Bedtime  ipratropium-albuterol, 3 mL, Nebulization, Q4H - RT  metoprolol tartrate, 100 mg, Oral, BID  montelukast, 10 mg, Oral, Daily  mupirocin, 1 application , Topical, Q12H  piperacillin-tazobactam, 4.5 g, Intravenous, Q8H  senna-docusate sodium, 2 tablet, Oral, BID  sodium chloride, 4 mL, Nebulization, BID - RT      IV meds:                         Data Review:  Results from last 7 days   Lab Units 12/09/23 0624 12/08/23  0657 12/07/23  0833 12/06/23  2132   SODIUM mmol/L 137 141 134* 138   POTASSIUM mmol/L 4.2 4.2 4.3 4.0   CHLORIDE mmol/L 98 102 100 101   CO2 mmol/L 26.7 27.0 21.7* 24.2   BUN mg/dL 26* 35* 46* 48*   CREATININE mg/dL 2.36* 2.71* 2.79* 3.09*   GLUCOSE mg/dL 252* 167* 331* 151*   CALCIUM mg/dL 9.0 9.0 8.4* 8.5*         Estimated Creatinine Clearance: 44.8 mL/min (A) (by C-G formula based on SCr of 2.36 mg/dL (H)).  Results from last 7 days   Lab Units 12/09/23  0624 12/08/23  0657 12/07/23  0833 12/06/23  2132   WBC 10*3/mm3 17.71* 20.64* 21.60* 24.82*   HEMOGLOBIN g/dL 8.1* 8.3* 6.9* 7.7*   PLATELETS 10*3/mm3 463* 466* 474* 435     Results from last 7 days   Lab Units 12/06/23  2132   INR  1.34*     Results from last 7 days   Lab Units 12/09/23  0624 12/08/23  0657 12/06/23  2132   ALT (SGPT) U/L 27 27 22   AST (SGOT) U/L 21 23 17         Results from last 7 days   Lab Units 12/06/23 2132 12/04/23  0842   PROCALCITONIN ng/mL 1.33* 2.98*   LACTATE mmol/L 1.3  --          Glucose   Date/Time Value Ref Range Status   12/09/2023 0632 259 (H) 70 - 130 mg/dL Final   12/08/2023 2110 228 (H) 70 - 130 mg/dL Final   12/08/2023 1606 298 (H) 70 - 130 mg/dL Final    12/08/2023 1059 237 (H) 70 - 130 mg/dL Final   12/08/2023 0628 149 (H) 70 - 130 mg/dL Final   12/07/2023 2026 279 (H) 70 - 130 mg/dL Final   12/07/2023 1600 288 (H) 70 - 130 mg/dL Final         Imaging reviewed  Chest x-ray 12/6 reviewed          Microbiology reviewed  No growth to date          Active Hospital Problems    Diagnosis  POA    **Bacterial pneumonia [J15.9]  Yes    Bronchiectasis [J47.9]  Yes    Anemia [D64.9]  Yes    Sepsis [A41.9]  Yes    Essential hypertension [I10]  Yes    Chronic obstructive pulmonary disease [J44.9]  Yes    Stage 3b chronic kidney disease [N18.32]  Yes    Neurogenic bladder [N31.9]  Yes    Hyperlipidemia [E78.5]  Yes    Paroxysmal atrial fibrillation [I48.0]  Yes    Obstructive sleep apnea [G47.33]  Yes    Chronic diastolic heart failure [I50.32]  Yes    ERIC (acute kidney injury) [N17.9]  Yes      Resolved Hospital Problems   No resolved problems to display.         ASSESSMENT:  Right lower lobe pneumonia  Bronchiectasis with acute exacerbation  COPD with acute exacerbation  Acute on chronic hypoxemic respiratory failure  Acute kidney injury on chronic kidney disease  Morbid obesity: BMI 41  Type 2 diabetes      PLAN:  Continue antibiotics per ID recs.  Encourage pulmonary toilet.   Aggressive clearance measures with hypertonic saline and bronchodilators.  Wean oxygen as able.  Irregular-appearing infiltrate on x-ray but patient is rotated.  Repeat chest x-ray for follow-up and if does not show signs of improving would consider CT chest.  Has had an abnormality in the right lower lung field going back to November per reports from [place].  I am not able to visualize those images however in [facility].  CT ordered yesterday and still pending.    Nathan Richards MD  Pulmonary and Critical Care Medicine  [facility], [facility]  12/9/2023    08:06 EST

## 2023-12-10 LAB
ALBUMIN SERPL-MCNC: 3.1 G/DL (ref 3.5–5.2)
ALBUMIN/GLOB SERPL: 0.8 G/DL
ALP SERPL-CCNC: 138 U/L (ref 39–117)
ALT SERPL W P-5'-P-CCNC: 24 U/L (ref 1–41)
ANION GAP SERPL CALCULATED.3IONS-SCNC: 11 MMOL/L (ref 5–15)
AST SERPL-CCNC: 20 U/L (ref 1–40)
BACTERIA SPEC RESP CULT: NORMAL
BILIRUB SERPL-MCNC: <0.2 MG/DL (ref 0–1.2)
BUN SERPL-MCNC: 29 MG/DL (ref 6–20)
BUN/CREAT SERPL: 12.8 (ref 7–25)
CALCIUM SPEC-SCNC: 9 MG/DL (ref 8.6–10.5)
CHLORIDE SERPL-SCNC: 99 MMOL/L (ref 98–107)
CO2 SERPL-SCNC: 26 MMOL/L (ref 22–29)
CREAT SERPL-MCNC: 2.27 MG/DL (ref 0.76–1.27)
DEPRECATED RDW RBC AUTO: 44.8 FL (ref 37–54)
EGFRCR SERPLBLD CKD-EPI 2021: 32.6 ML/MIN/1.73
ERYTHROCYTE [DISTWIDTH] IN BLOOD BY AUTOMATED COUNT: 13.3 % (ref 12.3–15.4)
GLOBULIN UR ELPH-MCNC: 3.7 GM/DL
GLUCOSE BLDC GLUCOMTR-MCNC: 278 MG/DL (ref 70–130)
GLUCOSE BLDC GLUCOMTR-MCNC: 341 MG/DL (ref 70–130)
GLUCOSE BLDC GLUCOMTR-MCNC: 383 MG/DL (ref 70–130)
GLUCOSE BLDC GLUCOMTR-MCNC: 392 MG/DL (ref 70–130)
GLUCOSE SERPL-MCNC: 270 MG/DL (ref 65–99)
GRAM STN SPEC: NORMAL
HCT VFR BLD AUTO: 26.9 % (ref 37.5–51)
HGB BLD-MCNC: 8.3 G/DL (ref 13–17.7)
MAGNESIUM SERPL-MCNC: 2 MG/DL (ref 1.6–2.6)
MCH RBC QN AUTO: 28.1 PG (ref 26.6–33)
MCHC RBC AUTO-ENTMCNC: 30.9 G/DL (ref 31.5–35.7)
MCV RBC AUTO: 91.2 FL (ref 79–97)
PLATELET # BLD AUTO: 460 10*3/MM3 (ref 140–450)
PMV BLD AUTO: 8.5 FL (ref 6–12)
POTASSIUM SERPL-SCNC: 5 MMOL/L (ref 3.5–5.2)
PROT SERPL-MCNC: 6.8 G/DL (ref 6–8.5)
RBC # BLD AUTO: 2.95 10*6/MM3 (ref 4.14–5.8)
SODIUM SERPL-SCNC: 136 MMOL/L (ref 136–145)
WBC NRBC COR # BLD AUTO: 15.63 10*3/MM3 (ref 3.4–10.8)

## 2023-12-10 PROCEDURE — 83735 ASSAY OF MAGNESIUM: CPT | Performed by: HOSPITALIST

## 2023-12-10 PROCEDURE — 94761 N-INVAS EAR/PLS OXIMETRY MLT: CPT

## 2023-12-10 PROCEDURE — 94799 UNLISTED PULMONARY SVC/PX: CPT

## 2023-12-10 PROCEDURE — 94664 DEMO&/EVAL PT USE INHALER: CPT

## 2023-12-10 PROCEDURE — 82948 REAGENT STRIP/BLOOD GLUCOSE: CPT

## 2023-12-10 PROCEDURE — 80053 COMPREHEN METABOLIC PANEL: CPT | Performed by: HOSPITALIST

## 2023-12-10 PROCEDURE — 25010000002 HEPARIN (PORCINE) PER 1000 UNITS: Performed by: HOSPITALIST

## 2023-12-10 PROCEDURE — 63710000001 INSULIN GLARGINE PER 5 UNITS: Performed by: HOSPITALIST

## 2023-12-10 PROCEDURE — 63710000001 INSULIN LISPRO (HUMAN) PER 5 UNITS: Performed by: HOSPITALIST

## 2023-12-10 PROCEDURE — 25010000002 PIPERACILLIN SOD-TAZOBACTAM PER 1 G: Performed by: STUDENT IN AN ORGANIZED HEALTH CARE EDUCATION/TRAINING PROGRAM

## 2023-12-10 PROCEDURE — 99232 SBSQ HOSP IP/OBS MODERATE 35: CPT | Performed by: STUDENT IN AN ORGANIZED HEALTH CARE EDUCATION/TRAINING PROGRAM

## 2023-12-10 PROCEDURE — 85027 COMPLETE CBC AUTOMATED: CPT | Performed by: HOSPITALIST

## 2023-12-10 RX ADMIN — METOPROLOL TARTRATE 100 MG: 50 TABLET, FILM COATED ORAL at 20:48

## 2023-12-10 RX ADMIN — MUPIROCIN 1 APPLICATION: 20 OINTMENT TOPICAL at 20:49

## 2023-12-10 RX ADMIN — HEPARIN SODIUM 5000 UNITS: 5000 INJECTION INTRAVENOUS; SUBCUTANEOUS at 05:57

## 2023-12-10 RX ADMIN — IPRATROPIUM BROMIDE AND ALBUTEROL SULFATE 3 ML: 2.5; .5 SOLUTION RESPIRATORY (INHALATION) at 16:40

## 2023-12-10 RX ADMIN — INSULIN LISPRO 12 UNITS: 100 INJECTION, SOLUTION INTRAVENOUS; SUBCUTANEOUS at 11:48

## 2023-12-10 RX ADMIN — PIPERACILLIN SODIUM AND TAZOBACTAM SODIUM 4.5 G: 4; .5 INJECTION, SOLUTION INTRAVENOUS at 00:07

## 2023-12-10 RX ADMIN — FINASTERIDE 5 MG: 5 TABLET, FILM COATED ORAL at 08:11

## 2023-12-10 RX ADMIN — ATORVASTATIN CALCIUM 20 MG: 20 TABLET, FILM COATED ORAL at 20:48

## 2023-12-10 RX ADMIN — PIPERACILLIN SODIUM AND TAZOBACTAM SODIUM 4.5 G: 4; .5 INJECTION, SOLUTION INTRAVENOUS at 23:38

## 2023-12-10 RX ADMIN — IPRATROPIUM BROMIDE AND ALBUTEROL SULFATE 3 ML: 2.5; .5 SOLUTION RESPIRATORY (INHALATION) at 07:41

## 2023-12-10 RX ADMIN — HEPARIN SODIUM 5000 UNITS: 5000 INJECTION INTRAVENOUS; SUBCUTANEOUS at 13:52

## 2023-12-10 RX ADMIN — INSULIN LISPRO 8 UNITS: 100 INJECTION, SOLUTION INTRAVENOUS; SUBCUTANEOUS at 08:12

## 2023-12-10 RX ADMIN — METOPROLOL TARTRATE 100 MG: 50 TABLET, FILM COATED ORAL at 08:11

## 2023-12-10 RX ADMIN — MONTELUKAST SODIUM 10 MG: 10 TABLET, FILM COATED ORAL at 08:11

## 2023-12-10 RX ADMIN — IPRATROPIUM BROMIDE AND ALBUTEROL SULFATE 3 ML: 2.5; .5 SOLUTION RESPIRATORY (INHALATION) at 19:03

## 2023-12-10 RX ADMIN — INSULIN GLARGINE 40 UNITS: 100 INJECTION, SOLUTION SUBCUTANEOUS at 20:48

## 2023-12-10 RX ADMIN — MUPIROCIN 1 APPLICATION: 20 OINTMENT TOPICAL at 08:11

## 2023-12-10 RX ADMIN — GABAPENTIN 300 MG: 300 CAPSULE ORAL at 20:49

## 2023-12-10 RX ADMIN — ZINC OXIDE 1 APPLICATION: 200 OINTMENT TOPICAL at 20:49

## 2023-12-10 RX ADMIN — GABAPENTIN 300 MG: 300 CAPSULE ORAL at 05:57

## 2023-12-10 RX ADMIN — BUDESONIDE AND FORMOTEROL FUMARATE DIHYDRATE 2 PUFF: 160; 4.5 AEROSOL RESPIRATORY (INHALATION) at 22:41

## 2023-12-10 RX ADMIN — GABAPENTIN 300 MG: 300 CAPSULE ORAL at 13:51

## 2023-12-10 RX ADMIN — FAMOTIDINE 40 MG: 20 TABLET ORAL at 06:06

## 2023-12-10 RX ADMIN — IPRATROPIUM BROMIDE AND ALBUTEROL SULFATE 3 ML: 2.5; .5 SOLUTION RESPIRATORY (INHALATION) at 13:10

## 2023-12-10 RX ADMIN — IPRATROPIUM BROMIDE AND ALBUTEROL SULFATE 3 ML: 2.5; .5 SOLUTION RESPIRATORY (INHALATION) at 22:40

## 2023-12-10 RX ADMIN — HEPARIN SODIUM 5000 UNITS: 5000 INJECTION INTRAVENOUS; SUBCUTANEOUS at 20:49

## 2023-12-10 RX ADMIN — BUDESONIDE AND FORMOTEROL FUMARATE DIHYDRATE 2 PUFF: 160; 4.5 AEROSOL RESPIRATORY (INHALATION) at 07:59

## 2023-12-10 RX ADMIN — INSULIN LISPRO 12 UNITS: 100 INJECTION, SOLUTION INTRAVENOUS; SUBCUTANEOUS at 17:39

## 2023-12-10 RX ADMIN — Medication 4 ML: at 22:41

## 2023-12-10 RX ADMIN — INSULIN LISPRO 10 UNITS: 100 INJECTION, SOLUTION INTRAVENOUS; SUBCUTANEOUS at 20:48

## 2023-12-10 RX ADMIN — Medication 4 ML: at 07:45

## 2023-12-10 RX ADMIN — PIPERACILLIN SODIUM AND TAZOBACTAM SODIUM 4.5 G: 4; .5 INJECTION, SOLUTION INTRAVENOUS at 15:46

## 2023-12-10 RX ADMIN — PIPERACILLIN SODIUM AND TAZOBACTAM SODIUM 4.5 G: 4; .5 INJECTION, SOLUTION INTRAVENOUS at 06:46

## 2023-12-10 NOTE — PROGRESS NOTES
Name: Preston Wallis ADMIT: 2023   : 1965  PCP: Kimmy Riley MD    MRN: [id number] LOS: 4 days   AGE/SEX: 58 y.o. male  ROOM: [facility]     Subjective   Subjective   Feeling better again today. Breathing is better--feels it's back to baseline for him. No cough. No SOA at rest. No N/V/D/abd pain. Tolerating diet and eating well. No F/C/NS. No CP or palp.       Objective   Objective   Vital Signs  Temp:  [97.9 °F (36.6 °C)-98.6 °F (37 °C)] 98.1 °F (36.7 °C)  Heart Rate:  [79-91] 90  Resp:  [18-20] 18  BP: (123-188)/() 123/107  SpO2:  [94 %-100 %] 98 %  on  Flow (L/min):  [2-3.5] 2;   Device (Oxygen Therapy): nasal cannula  Body mass index is 41.02 kg/m².  Physical Exam  Vitals and nursing note reviewed.   Constitutional:       General: He is not in acute distress.     Appearance: He is obese. He is ill-appearing (chronically). He is not toxic-appearing or diaphoretic.   HENT:      Head: Normocephalic.      Nose: Nose normal.      Mouth/Throat:      Mouth: Mucous membranes are moist.      Pharynx: Oropharynx is clear.   Eyes:      General: No scleral icterus.        Right eye: No discharge.         Left eye: No discharge.      Conjunctiva/sclera: Conjunctivae normal.   Cardiovascular:      Rate and Rhythm: Normal rate and regular rhythm.      Pulses: Normal pulses.   Pulmonary:      Effort: Pulmonary effort is normal. No respiratory distress.      Breath sounds: Wheezing (diffuse) and rhonchi present. No rales.   Abdominal:      General: Bowel sounds are normal. There is no distension.      Palpations: Abdomen is soft.      Tenderness: There is no abdominal tenderness.   Genitourinary:     Comments: Clear yellow urine in lopez bag  Musculoskeletal:         General: Swelling present. Normal range of motion.      Cervical back: Neck supple.   Skin:     General: Skin is warm and dry.      Capillary Refill: Capillary refill takes less than 2 seconds.      Coloration: Skin is pale. Skin is not  jaundiced.   Neurological:      General: No focal deficit present.      Mental Status: He is alert and oriented to person, place, and time. Mental status is at baseline.      Cranial Nerves: No cranial nerve deficit.      Coordination: Coordination normal.   Psychiatric:         Mood and Affect: Mood normal.         Behavior: Behavior normal.         Thought Content: Thought content normal.       Results Review     I reviewed the patient's new clinical results.  Results from last 7 days   Lab Units 12/10/23  0608 12/09/23  0624 12/08/23  0657 12/07/23  0833   WBC 10*3/mm3 15.63* 17.71* 20.64* 21.60*   HEMOGLOBIN g/dL 8.3* 8.1* 8.3* 6.9*   PLATELETS 10*3/mm3 460* 463* 466* 474*     Results from last 7 days   Lab Units 12/10/23  0608 12/09/23  0624 12/08/23  0657 12/07/23  0833   SODIUM mmol/L 136 137 141 134*   POTASSIUM mmol/L 5.0 4.2 4.2 4.3   CHLORIDE mmol/L 99 98 102 100   CO2 mmol/L 26.0 26.7 27.0 21.7*   BUN mg/dL 29* 26* 35* 46*   CREATININE mg/dL 2.27* 2.36* 2.71* 2.79*   GLUCOSE mg/dL 270* 252* 167* 331*   EGFR mL/min/1.73 32.6* 31.1* 26.4* 25.5*     Results from last 7 days   Lab Units 12/10/23  0608 12/09/23  0624 12/08/23  0657 12/06/23  2132   ALBUMIN g/dL 3.1* 3.1* 3.1* 2.8*   BILIRUBIN mg/dL <0.2 0.2 0.2 0.3   ALK PHOS U/L 138* 132* 130* 124*   AST (SGOT) U/L 20 21 23 17   ALT (SGPT) U/L 24 27 27 22     Results from last 7 days   Lab Units 12/10/23  0608 12/09/23  0624 12/08/23  0657 12/07/23  0833 12/06/23  2132   CALCIUM mg/dL 9.0 9.0 9.0 8.4* 8.5*   ALBUMIN g/dL 3.1* 3.1* 3.1*  --  2.8*   MAGNESIUM mg/dL 2.0 2.1 2.4  --   --    PHOSPHORUS mg/dL  --  3.1 3.9  --   --      Results from last 7 days   Lab Units 12/06/23  2132 12/04/23  0842   PROCALCITONIN ng/mL 1.33* 2.98*   LACTATE mmol/L 1.3  --      Glucose   Date/Time Value Ref Range Status   12/10/2023 0617 278 (H) 70 - 130 mg/dL Final   12/09/2023 1959 303 (H) 70 - 130 mg/dL Final   12/09/2023 1637 269 (H) 70 - 130 mg/dL Final   12/09/2023 1149  349 (H) 70 - 130 mg/dL Final   12/09/2023 0632 259 (H) 70 - 130 mg/dL Final   12/08/2023 2110 228 (H) 70 - 130 mg/dL Final   12/08/2023 1606 298 (H) 70 - 130 mg/dL Final       XR Chest PA & Lateral    Result Date: 12/8/2023  Unchanged at least partial right upper lobe collapse with right-sided volume loss and rightward mediastinal shift. Associated widening of the right paratracheal stripe. Very similar bilateral right greater than left parenchymal opacities. Right lower lobe opacity silhouettes the shifted right heart border. Consider updated chest CT. Right subclavian port with tip overlying the mid SVC. Overlapping left subclavian central venous catheter tubing. No pleural effusion or pneumothorax. Stable normal size rightward shifted cardiomediastinal silhouette.  This report was finalized on 12/8/2023 12:28 PM by Dr. Pawan Mcallister M.D on Workstation: [id number]       I have personally reviewed all medications:  Scheduled Medications  atorvastatin, 20 mg, Oral, Nightly  budesonide-formoterol, 2 puff, Inhalation, BID - RT  docusate sodium, 100 mg, Oral, Daily  famotidine, 40 mg, Oral, QAM  finasteride, 5 mg, Oral, Daily  gabapentin, 300 mg, Oral, Q8H  heparin (porcine), 5,000 Units, Subcutaneous, Q8H  hydrocortisone-bacitracin-zinc oxide-nystatin, 1 application , Topical, Q12H  insulin glargine, 35 Units, Subcutaneous, Nightly  insulin lispro, 3-14 Units, Subcutaneous, 4x Daily AC & at Bedtime  ipratropium-albuterol, 3 mL, Nebulization, Q4H - RT  metoprolol tartrate, 100 mg, Oral, BID  montelukast, 10 mg, Oral, Daily  mupirocin, 1 application , Topical, Q12H  piperacillin-tazobactam, 4.5 g, Intravenous, Q8H  senna-docusate sodium, 2 tablet, Oral, BID  sodium chloride, 4 mL, Nebulization, BID - RT    Infusions   Diet  Diet: Cardiac Diets, Diabetic Diets; Healthy Heart (2-3 Na+); Consistent Carbohydrate; Texture: Regular Texture (IDDSI 7); Fluid Consistency: Thin (IDDSI 0)    I have personally reviewed:  [x]   Laboratory   [x]  Microbiology   [x]  Radiology   [x]  EKG/Telemetry  []  Cardiology/Vascular   []  Pathology    []  Records       Assessment/Plan     Active Hospital Problems    Diagnosis  POA    **Bacterial pneumonia [J15.9]  Yes    Bronchiectasis [J47.9]  Yes    Anemia [D64.9]  Yes    Sepsis [A41.9]  Yes    Essential hypertension [I10]  Yes    Chronic obstructive pulmonary disease [J44.9]  Yes    Stage 3b chronic kidney disease [N18.32]  Yes    Neurogenic bladder [N31.9]  Yes    Hyperlipidemia [E78.5]  Yes    Paroxysmal atrial fibrillation [I48.0]  Yes    Obstructive sleep apnea [G47.33]  Yes    Chronic diastolic heart failure [I50.32]  Yes    ERIC (acute kidney injury) [N17.9]  Yes      Resolved Hospital Problems   No resolved problems to display.       59yo gentleman, former smoker, with COPD, chronic bronchiectasis, CHRF, MILADY, chronic diastolic CHF, CKD3b, DM2, HTN, HLD, PAF (Eliquis), neurogenic bladder, morbid obesity, and prior h/o both MRSA PNA and Pseudomonas PNA, who was admitted to [facility] on 11/28 for RLL PNA. He was initially treated with Vanc/Cefepime and MRSA screen was positive. Sputum culture grew Achromobacter xylosoxidans on 12/3 and he was changed to Zosyn/Bactrim. Despite this he had worsening of O2 requirements and persistent leukocytosis. Renal function worsened so Bactrim was changed to Zyvox and arrangements were made to transfer him to [facility] so that he could evaluated by Infectious Disease, Pulm, and Renal services.     RLL PNA (MRSA and Achromobacter)  Acute on chronic hypoxic resp failure  Pulm and ID following  Afebrile and VSS, stable on 2L/min (baseline)  WBC falling  ID has changed Zyvox to Zosyn  Recommending abx through 12/14  CT chest c/w infections--both old and acute, no mass seen  Will need outpt f/u with Pulm for re-imaging  Continue pulm hygiene efforts with hypertonic saline nebs     COPD  Chronic bronchiectasis  Appreciate Pulm attention to pt  Supplemental  O2 stable at 2L/min  Treating PNA  Scheduled DuoNebs and Symbicort, Singulair     ERIC/CKD3b  Appreciate Renal attention to pt  Cr sl better again today  Holding diuretic     Anemia NOS  Hgb improved after a unit of PRBCs 12/7, continue to monitor  Labs c/w chronic disease and mild iron def     Abd wall cellulitis/abscess  Resolved after I&D by Surg at [place]  Topical Mupirocin  [facility] so [facility]  On SQ Heparin for DVT ppx, watch for recurrence of bleeding--none so far     PAF (Eliquis)  NSR on EKG here  HRs acceptable on metoprolol and BPs tolerating  Eliquis on hold due to bleeding from abd wall I&D site (now resolved)--consider restarting AC      Chronic diastolic CHF  Holding Bumex due to ERIC  Defer volume status to Renal for now     DM2  A1c 8.4  Continue Lantus/SSI   Sugars high  Increase Lantus dose again tonight  Holding Bydureon and Farxiga     HTN  BPs acceptable if a bit robust at times  Continue metoprolol     Neurogenic bladder  Chronic urinary retention  Pt self-caths 4 times/day normally  Doyle cath in place for now     Morbid obesity  Complicating all aspects of care     Bedbug infestation    Heparin SC for DVT prophylaxis.  Full code.  Discussed with patient.  Anticipate discharge TBD        Iam Eller MD  [facility]  12/10/23  11:01 EST

## 2023-12-10 NOTE — PROGRESS NOTES
[facility] INPATIENT PROGRESS NOTE         [address]    12/10/2023      PATIENT IDENTIFICATION:  Name: Preston Wallis ADMIT: 2023   : 1965  PCP: Kimmy Riley MD    MRN: [id number] LOS: 4 days   AGE/SEX: 58 y.o. male  ROOM: [facility]                     LOS 4    Reason for visit: Pneumonia and COPD exacerbation      SUBJECTIVE:      Less short of breath.  On 2 L supplemental oxygen with saturations 98%.    Objective   OBJECTIVE:    Vital Sign Min/Max for last 24 hours  Temp  Min: 97.9 °F (36.6 °C)  Max: 98.6 °F (37 °C)   BP  Min: 123/107  Max: 188/79   Pulse  Min: 79  Max: 91   Resp  Min: 18  Max: 20   SpO2  Min: 94 %  Max: 100 %   No data recorded   No data recorded    Vitals:    12/10/23 0741 12/10/23 0759 12/10/23 0811 12/10/23 0816   BP:    (!) 123/107   BP Location:    Right arm   Patient Position:    Sitting   Pulse: 89 82 88 90   Resp: 18 18     Temp:       TempSrc:       SpO2: 100%   98%   Weight:       Height:                23  1904   Weight: 126 kg (277 lb 12.5 oz)       Body mass index is 41.02 kg/m².                          Body mass index is 41.02 kg/m².    Intake/Output Summary (Last 24 hours) at 12/10/2023 0846  Last data filed at 12/10/2023 0557  Gross per 24 hour   Intake 720 ml   Output 3050 ml   Net -2330 ml         Exam:  GEN:  No distress, appears stated age  NECK:  midline trachea  LUNGS: Nonlabored    Assessment     Scheduled meds:  atorvastatin, 20 mg, Oral, Nightly  budesonide-formoterol, 2 puff, Inhalation, BID - RT  docusate sodium, 100 mg, Oral, Daily  famotidine, 40 mg, Oral, QAM  finasteride, 5 mg, Oral, Daily  gabapentin, 300 mg, Oral, Q8H  heparin (porcine), 5,000 Units, Subcutaneous, Q8H  hydrocortisone-bacitracin-zinc oxide-nystatin, 1 application , Topical, Q12H  insulin glargine, 35 Units, Subcutaneous, Nightly  insulin lispro, 3-14 Units, Subcutaneous, 4x Daily AC & at Bedtime  ipratropium-albuterol, 3 mL, Nebulization, Q4H -  RT  metoprolol tartrate, 100 mg, Oral, BID  montelukast, 10 mg, Oral, Daily  mupirocin, 1 application , Topical, Q12H  piperacillin-tazobactam, 4.5 g, Intravenous, Q8H  senna-docusate sodium, 2 tablet, Oral, BID  sodium chloride, 4 mL, Nebulization, BID - RT      IV meds:                         Data Review:  Results from last 7 days   Lab Units 12/10/23  0608 12/09/23  0624 12/08/23  0657 12/07/23  0833 12/06/23 2132   SODIUM mmol/L 136 137 141 134* 138   POTASSIUM mmol/L 5.0 4.2 4.2 4.3 4.0   CHLORIDE mmol/L 99 98 102 100 101   CO2 mmol/L 26.0 26.7 27.0 21.7* 24.2   BUN mg/dL 29* 26* 35* 46* 48*   CREATININE mg/dL 2.27* 2.36* 2.71* 2.79* 3.09*   GLUCOSE mg/dL 270* 252* 167* 331* 151*   CALCIUM mg/dL 9.0 9.0 9.0 8.4* 8.5*         Estimated Creatinine Clearance: 46.6 mL/min (A) (by C-G formula based on SCr of 2.27 mg/dL (H)).  Results from last 7 days   Lab Units 12/10/23  0608 12/09/23  0624 12/08/23  0657 12/07/23  0833 12/06/23 2132   WBC 10*3/mm3 15.63* 17.71* 20.64* 21.60* 24.82*   HEMOGLOBIN g/dL 8.3* 8.1* 8.3* 6.9* 7.7*   PLATELETS 10*3/mm3 460* 463* 466* 474* 435     Results from last 7 days   Lab Units 12/06/23  2132   INR  1.34*     Results from last 7 days   Lab Units 12/10/23  0608 12/09/23  0624 12/08/23  0657 12/06/23  2132   ALT (SGPT) U/L 24 27 27 22   AST (SGOT) U/L 20 21 23 17         Results from last 7 days   Lab Units 12/06/23 2132 12/04/23  0842   PROCALCITONIN ng/mL 1.33* 2.98*   LACTATE mmol/L 1.3  --          Glucose   Date/Time Value Ref Range Status   12/10/2023 0617 278 (H) 70 - 130 mg/dL Final   12/09/2023 1959 303 (H) 70 - 130 mg/dL Final   12/09/2023 1637 269 (H) 70 - 130 mg/dL Final   12/09/2023 1149 349 (H) 70 - 130 mg/dL Final   12/09/2023 0632 259 (H) 70 - 130 mg/dL Final   12/08/2023 2110 228 (H) 70 - 130 mg/dL Final   12/08/2023 1606 298 (H) 70 - 130 mg/dL Final         Imaging reviewed  Chest x-ray 12/6 reviewed        CT chest 12/9 reviewed: cystic disease and infectious  infiltrate.  No lung mass.      Microbiology reviewed  No growth to date          Active Hospital Problems    Diagnosis  POA    **Bacterial pneumonia [J15.9]  Yes    Bronchiectasis [J47.9]  Yes    Anemia [D64.9]  Yes    Sepsis [A41.9]  Yes    Essential hypertension [I10]  Yes    Chronic obstructive pulmonary disease [J44.9]  Yes    Stage 3b chronic kidney disease [N18.32]  Yes    Neurogenic bladder [N31.9]  Yes    Hyperlipidemia [E78.5]  Yes    Paroxysmal atrial fibrillation [I48.0]  Yes    Obstructive sleep apnea [G47.33]  Yes    Chronic diastolic heart failure [I50.32]  Yes    ERIC (acute kidney injury) [N17.9]  Yes      Resolved Hospital Problems   No resolved problems to display.         ASSESSMENT:  Right lower lobe pneumonia  Bronchiectasis with acute exacerbation  COPD with acute exacerbation  Acute on chronic hypoxemic respiratory failure  Acute kidney injury on chronic kidney disease  Morbid obesity: BMI 41  Type 2 diabetes      PLAN:  Continue antibiotics per ID recs.  Encourage pulmonary toilet.   Aggressive clearance measures with hypertonic saline and bronchodilators.  Wean oxygen as able.  On 2LPM and sats 98%.  Chronic scarring right base from prior infections.  Cystic changes bilaterally right > left.  Infectious infiltrate noted on CT chest but no signs of lung mass.  F/U in pulmonary clinic after discharge recommended.   We will see as needed.    Nathan Richards MD  Pulmonary and Critical Care Medicine  [facility]  12/10/2023    08:46 EST

## 2023-12-10 NOTE — PLAN OF CARE
Goal Outcome Evaluation:           Progress: improving  Outcome Evaluation: Patient up in chair for all meals, encouraged skin care and dsg on abdominal wound changed. Patient does get short of breath on exertion and states he has an unproductive cough, encouraged and instructed on use of IS. Lungs sound wheezy on expiration, encouraged deep breathing and coughing technique.

## 2023-12-10 NOTE — PROGRESS NOTES
LOS: 4 days     Chief Complaint: Pneumonia    Interval History: Patient reports he is feeling better today.  States his breathing is about the same.  Overall reports cough improved.  Leukocytosis downtrending.  Remains afebrile.    Vital Signs  Temp:  [97.9 °F (36.6 °C)-98.6 °F (37 °C)] 98.1 °F (36.7 °C)  Heart Rate:  [79-91] 90  Resp:  [18-20] 18  BP: (123-188)/() 123/107    Physical Exam:  General: In no acute distress  HEENT: Oropharynx clear, moist mucous membranes  Respiratory: Normal work of breathing on 2 L  Skin: No rashes or lesions  Extremities: No edema, cyanosis  Access: Peripheral IV    Antibiotics:  Anti-Infectives (From admission, onward)      Ordered     Dose/Rate Route Frequency Start Stop    12/07/23 0850  piperacillin-tazobactam (ZOSYN) 4.5 g in iso-osmotic dextrose 100 mL IVPB (premix)        Ordering Provider: Byron Hinds DO    4.5 g  over 4 Hours Intravenous Every 8 Hours 12/07/23 1545 12/14/23 1544    12/07/23 0850  piperacillin-tazobactam (ZOSYN) 4.5 g in iso-osmotic dextrose 100 mL IVPB (premix)        Ordering Provider: Byron Hinds DO    4.5 g  over 30 Minutes Intravenous Once 12/07/23 0945 12/07/23 1025             Results Review:     I reviewed the patient's new clinical results.    Lab Results   Component Value Date    WBC 15.63 (H) 12/10/2023    HGB 8.3 (L) 12/10/2023    HCT 26.9 (L) 12/10/2023    MCV 91.2 12/10/2023     (H) 12/10/2023     Lab Results   Component Value Date    GLUCOSE 270 (H) 12/10/2023    BUN 29 (H) 12/10/2023    CREATININE 2.27 (H) 12/10/2023    EGFRIFNONA 46 (L) 07/27/2021    BCR 12.8 12/10/2023    CO2 26.0 12/10/2023    CALCIUM 9.0 12/10/2023    PROTENTOTREF 7.1 08/22/2022    ALBUMIN 3.1 (L) 12/10/2023    LABIL2 0.8 08/22/2022    AST 20 12/10/2023    ALT 24 12/10/2023       Microbiology:  Outside studies:  - Sputum culture Achromobacter xylosoxidans  -MRSA nares positive  -Abdominal wound culture no growth    12/7 Legionella urine  antigen negative  12/7 strep pneumo urine antigen negative  12/7 MRSA nares negative  12/7 respiratory culture rejected  12/8 respiratory culture in process    Assessment    #Right lower lobe pneumonia  #ERIC on CKD  #Acute hypoxic respiratory failure  #Bronchiectasis with acute exacerbation  #Morbid obesity, BMI 41  #Type 2 diabetes    Repeat respiratory culture here has been negative to date.  Plan to continue Zosyn 4.5 every 8 hours targeting Achromobacter for 7-day course while inpatient.  End date will be 12/14.  If discharged prior to completion of therapy would recommend Bactrim 1 DS tablet twice daily to finish out the same duration.    Thank you for allowing me to be involved in the care of this patient. Infectious diseases will sign off at this time with antibiotics plan in place, but please call me at [phone] if any further ID questions or new ID concerns.

## 2023-12-10 NOTE — PLAN OF CARE
Goal Outcome Evaluation:  Plan of Care Reviewed With: patient        Progress: no change     VSS. No c/o pain. IV zosyn via port. Labs to be drawn via port this AM. Accuchek CARSON. Drsg change to abd done last night. Mepilex to L thigh changed as well. Turned q2h.

## 2023-12-10 NOTE — PROGRESS NOTES
[facility] Progress Note      Patient Name: Preston Wallis  : 1965  MRN: [id number]  Primary Care Physician:  Kimmy Riley MD  Date of admission: 2023    Subjective     Interval History:   Follow-up acute kidney injury on chronic kidney disease    No shortness of breath lying flat; on 2 L/min, his usual home dose  Appetite is good; no N/V  UOP yesterday 2.2 L    Review of Systems:   As noted above    Objective     Vitals:   Temp:  [98.2 °F (36.8 °C)-98.6 °F (37 °C)] 98.6 °F (37 °C)  Heart Rate:  [79-97] 90  Resp:  [18-20] 18  BP: (145-188)/(67-79) 188/79  Flow (L/min):  [2-3.5] 2    Intake/Output Summary (Last 24 hours) at 2023  Last data filed at 2023  Gross per 24 hour   Intake 240 ml   Output 2750 ml   Net -2510 ml       Physical Exam:    General Appearance: alert, awake, chronically ill, obese, NAD  Skin: warm and dry  HEENT: oral mucosa normal, nonicteric sclera  Neck: supple, no JVD  Lungs: Diffuse wheezing, not labored on 2 L/min  Heart: RR, tachycardic, normal S1 and S2, no S3, no rub  Abdomen: soft, nontender, distended, BS +  : Doyle catheter anchored in place  Extremities: 1+ pedal and pretibial edema.  Neuro: normal speech and mental status     Scheduled Meds:     atorvastatin, 20 mg, Oral, Nightly  budesonide-formoterol, 2 puff, Inhalation, BID - RT  docusate sodium, 100 mg, Oral, Daily  famotidine, 40 mg, Oral, QAM  finasteride, 5 mg, Oral, Daily  gabapentin, 300 mg, Oral, Q8H  heparin (porcine), 5,000 Units, Subcutaneous, Q8H  hydrocortisone-bacitracin-zinc oxide-nystatin, 1 application , Topical, Q12H  insulin glargine, 35 Units, Subcutaneous, Nightly  insulin lispro, 3-14 Units, Subcutaneous, 4x Daily AC & at Bedtime  ipratropium-albuterol, 3 mL, Nebulization, Q4H - RT  metoprolol tartrate, 100 mg, Oral, BID  montelukast, 10 mg, Oral, Daily  mupirocin, 1 application , Topical, Q12H  piperacillin-tazobactam, 4.5 g, Intravenous,  Q8H  senna-docusate sodium, 2 tablet, Oral, BID  sodium chloride, 4 mL, Nebulization, BID - RT      IV Meds:        Results Reviewed:   I have personally reviewed the results from the time of this admission to 12/9/2023 21:01 EST     Results from last 7 days   Lab Units 12/09/23  0624 12/08/23  0657 12/07/23  0833 12/06/23  2132   SODIUM mmol/L 137 141 134* 138   POTASSIUM mmol/L 4.2 4.2 4.3 4.0   CHLORIDE mmol/L 98 102 100 101   CO2 mmol/L 26.7 27.0 21.7* 24.2   BUN mg/dL 26* 35* 46* 48*   CREATININE mg/dL 2.36* 2.71* 2.79* 3.09*   CALCIUM mg/dL 9.0 9.0 8.4* 8.5*   BILIRUBIN mg/dL 0.2 0.2  --  0.3   ALK PHOS U/L 132* 130*  --  124*   ALT (SGPT) U/L 27 27  --  22   AST (SGOT) U/L 21 23  --  17   GLUCOSE mg/dL 252* 167* 331* 151*       Estimated Creatinine Clearance: 44.8 mL/min (A) (by C-G formula based on SCr of 2.36 mg/dL (H)).    Results from last 7 days   Lab Units 12/09/23  0624 12/08/23  0657   MAGNESIUM mg/dL 2.1 2.4   PHOSPHORUS mg/dL 3.1 3.9       Results from last 7 days   Lab Units 12/09/23  0624 12/08/23  0657   URIC ACID mg/dL 4.7 5.7       Results from last 7 days   Lab Units 12/09/23  0624 12/08/23  0657 12/07/23  0833 12/06/23  2132   WBC 10*3/mm3 17.71* 20.64* 21.60* 24.82*   HEMOGLOBIN g/dL 8.1* 8.3* 6.9* 7.7*   PLATELETS 10*3/mm3 463* 466* 474* 435       Results from last 7 days   Lab Units 12/06/23  2132   INR  1.34*       Assessment / Plan     ASSESSMENT:  ERIC on CKD, nonoliguric, improving:  multifactorial; improving volume excess; stable electrolytes  Chronic kidney disease stage III secondary to diabetic nephropathy baseline creatinine 1.6-1.8, patient has a proteinuria his protein to creatinine ratio was 2542 mg/g on 12/7/2023.  Bronchiectasis with pneumonia, being evaluated by ID  Diabetes mellitus type 2 with renal complication; + neuropathy; also has been gastroparesis  Neurogenic bladder, requiring self-catheterization; currently has Doyle catheter anchored   Severe anemia  Morbid  obesity  Hypertension with chronic kidney disease, reasonably controlled  Chronic lung disease on continuous O2    PLAN:  Continue holding diuretic  Surveillance labs      Thank you for involving us in the care of Preston Wallis.  Please feel free to call with any questions.    Deshawn Turcios MD  12/09/23  21:01 [facility]    [facility]  [phone]    Please note that portions of this note were completed with a voice recognition program.

## 2023-12-11 ENCOUNTER — TELEPHONE (OUTPATIENT)
Dept: DIABETES SERVICES | Facility: HOSPITAL | Age: 58
End: 2023-12-11
Payer: COMMERCIAL

## 2023-12-11 DIAGNOSIS — E11.65 POORLY CONTROLLED TYPE 2 DIABETES MELLITUS WITH NEUROPATHY: ICD-10-CM

## 2023-12-11 DIAGNOSIS — E11.40 POORLY CONTROLLED TYPE 2 DIABETES MELLITUS WITH NEUROPATHY: ICD-10-CM

## 2023-12-11 LAB
ALBUMIN SERPL-MCNC: 3 G/DL (ref 3.5–5.2)
ANION GAP SERPL CALCULATED.3IONS-SCNC: 11.5 MMOL/L (ref 5–15)
BUN SERPL-MCNC: 31 MG/DL (ref 6–20)
BUN/CREAT SERPL: 14.8 (ref 7–25)
CALCIUM SPEC-SCNC: 9.2 MG/DL (ref 8.6–10.5)
CHLORIDE SERPL-SCNC: 101 MMOL/L (ref 98–107)
CO2 SERPL-SCNC: 25.5 MMOL/L (ref 22–29)
CREAT SERPL-MCNC: 2.1 MG/DL (ref 0.76–1.27)
DEPRECATED RDW RBC AUTO: 41 FL (ref 37–54)
EGFRCR SERPLBLD CKD-EPI 2021: 35.8 ML/MIN/1.73
ERYTHROCYTE [DISTWIDTH] IN BLOOD BY AUTOMATED COUNT: 12.7 % (ref 12.3–15.4)
GLUCOSE BLDC GLUCOMTR-MCNC: 247 MG/DL (ref 70–130)
GLUCOSE BLDC GLUCOMTR-MCNC: 273 MG/DL (ref 70–130)
GLUCOSE BLDC GLUCOMTR-MCNC: 375 MG/DL (ref 70–130)
GLUCOSE BLDC GLUCOMTR-MCNC: 412 MG/DL (ref 70–130)
GLUCOSE BLDC GLUCOMTR-MCNC: 442 MG/DL (ref 70–130)
GLUCOSE BLDC GLUCOMTR-MCNC: 458 MG/DL (ref 70–130)
GLUCOSE BLDC GLUCOMTR-MCNC: 474 MG/DL (ref 70–130)
GLUCOSE SERPL-MCNC: 396 MG/DL (ref 65–99)
HCT VFR BLD AUTO: 25.5 % (ref 37.5–51)
HGB BLD-MCNC: 7.6 G/DL (ref 13–17.7)
MAGNESIUM SERPL-MCNC: 2 MG/DL (ref 1.6–2.6)
MCH RBC QN AUTO: 26.6 PG (ref 26.6–33)
MCHC RBC AUTO-ENTMCNC: 29.8 G/DL (ref 31.5–35.7)
MCV RBC AUTO: 89.2 FL (ref 79–97)
PHOSPHATE SERPL-MCNC: 3.2 MG/DL (ref 2.5–4.5)
PLATELET # BLD AUTO: 453 10*3/MM3 (ref 140–450)
PMV BLD AUTO: 8.9 FL (ref 6–12)
POTASSIUM SERPL-SCNC: 4.9 MMOL/L (ref 3.5–5.2)
RBC # BLD AUTO: 2.86 10*6/MM3 (ref 4.14–5.8)
SODIUM SERPL-SCNC: 138 MMOL/L (ref 136–145)
WBC NRBC COR # BLD AUTO: 14.03 10*3/MM3 (ref 3.4–10.8)

## 2023-12-11 PROCEDURE — 94799 UNLISTED PULMONARY SVC/PX: CPT

## 2023-12-11 PROCEDURE — 97110 THERAPEUTIC EXERCISES: CPT

## 2023-12-11 PROCEDURE — 82948 REAGENT STRIP/BLOOD GLUCOSE: CPT

## 2023-12-11 PROCEDURE — 83735 ASSAY OF MAGNESIUM: CPT | Performed by: HOSPITALIST

## 2023-12-11 PROCEDURE — 63710000001 INSULIN LISPRO (HUMAN) PER 5 UNITS: Performed by: HOSPITALIST

## 2023-12-11 PROCEDURE — 25010000002 HEPARIN (PORCINE) PER 1000 UNITS: Performed by: HOSPITALIST

## 2023-12-11 PROCEDURE — 85027 COMPLETE CBC AUTOMATED: CPT | Performed by: HOSPITALIST

## 2023-12-11 PROCEDURE — 63710000001 INSULIN GLARGINE PER 5 UNITS: Performed by: HOSPITALIST

## 2023-12-11 PROCEDURE — 80069 RENAL FUNCTION PANEL: CPT | Performed by: INTERNAL MEDICINE

## 2023-12-11 PROCEDURE — 94760 N-INVAS EAR/PLS OXIMETRY 1: CPT

## 2023-12-11 PROCEDURE — 25010000002 PIPERACILLIN SOD-TAZOBACTAM PER 1 G: Performed by: STUDENT IN AN ORGANIZED HEALTH CARE EDUCATION/TRAINING PROGRAM

## 2023-12-11 PROCEDURE — 94664 DEMO&/EVAL PT USE INHALER: CPT

## 2023-12-11 PROCEDURE — 94761 N-INVAS EAR/PLS OXIMETRY MLT: CPT

## 2023-12-11 RX ORDER — INSULIN LISPRO 100 [IU]/ML
10 INJECTION, SOLUTION INTRAVENOUS; SUBCUTANEOUS ONCE
Status: COMPLETED | OUTPATIENT
Start: 2023-12-11 | End: 2023-12-11

## 2023-12-11 RX ADMIN — HEPARIN SODIUM 5000 UNITS: 5000 INJECTION INTRAVENOUS; SUBCUTANEOUS at 13:20

## 2023-12-11 RX ADMIN — INSULIN LISPRO 5 UNITS: 100 INJECTION, SOLUTION INTRAVENOUS; SUBCUTANEOUS at 17:09

## 2023-12-11 RX ADMIN — IPRATROPIUM BROMIDE AND ALBUTEROL SULFATE 3 ML: 2.5; .5 SOLUTION RESPIRATORY (INHALATION) at 21:47

## 2023-12-11 RX ADMIN — FAMOTIDINE 40 MG: 20 TABLET ORAL at 05:55

## 2023-12-11 RX ADMIN — INSULIN LISPRO 8 UNITS: 100 INJECTION, SOLUTION INTRAVENOUS; SUBCUTANEOUS at 23:11

## 2023-12-11 RX ADMIN — GABAPENTIN 300 MG: 300 CAPSULE ORAL at 13:20

## 2023-12-11 RX ADMIN — HEPARIN SODIUM 5000 UNITS: 5000 INJECTION INTRAVENOUS; SUBCUTANEOUS at 23:02

## 2023-12-11 RX ADMIN — IPRATROPIUM BROMIDE AND ALBUTEROL SULFATE 3 ML: 2.5; .5 SOLUTION RESPIRATORY (INHALATION) at 07:29

## 2023-12-11 RX ADMIN — HEPARIN SODIUM 5000 UNITS: 5000 INJECTION INTRAVENOUS; SUBCUTANEOUS at 05:55

## 2023-12-11 RX ADMIN — METOPROLOL TARTRATE 100 MG: 50 TABLET, FILM COATED ORAL at 08:57

## 2023-12-11 RX ADMIN — PIPERACILLIN SODIUM AND TAZOBACTAM SODIUM 4.5 G: 4; .5 INJECTION, SOLUTION INTRAVENOUS at 14:54

## 2023-12-11 RX ADMIN — IPRATROPIUM BROMIDE AND ALBUTEROL SULFATE 3 ML: 2.5; .5 SOLUTION RESPIRATORY (INHALATION) at 16:09

## 2023-12-11 RX ADMIN — GABAPENTIN 300 MG: 300 CAPSULE ORAL at 05:55

## 2023-12-11 RX ADMIN — ZINC OXIDE 1 APPLICATION: 200 OINTMENT TOPICAL at 08:57

## 2023-12-11 RX ADMIN — IPRATROPIUM BROMIDE AND ALBUTEROL SULFATE 3 ML: 2.5; .5 SOLUTION RESPIRATORY (INHALATION) at 03:23

## 2023-12-11 RX ADMIN — GABAPENTIN 300 MG: 300 CAPSULE ORAL at 23:02

## 2023-12-11 RX ADMIN — PIPERACILLIN SODIUM AND TAZOBACTAM SODIUM 4.5 G: 4; .5 INJECTION, SOLUTION INTRAVENOUS at 08:53

## 2023-12-11 RX ADMIN — BUDESONIDE AND FORMOTEROL FUMARATE DIHYDRATE 2 PUFF: 160; 4.5 AEROSOL RESPIRATORY (INHALATION) at 07:32

## 2023-12-11 RX ADMIN — IPRATROPIUM BROMIDE AND ALBUTEROL SULFATE 3 ML: 2.5; .5 SOLUTION RESPIRATORY (INHALATION) at 11:14

## 2023-12-11 RX ADMIN — PIPERACILLIN SODIUM AND TAZOBACTAM SODIUM 4.5 G: 4; .5 INJECTION, SOLUTION INTRAVENOUS at 23:02

## 2023-12-11 RX ADMIN — INSULIN LISPRO 14 UNITS: 100 INJECTION, SOLUTION INTRAVENOUS; SUBCUTANEOUS at 07:10

## 2023-12-11 RX ADMIN — BUDESONIDE AND FORMOTEROL FUMARATE DIHYDRATE 2 PUFF: 160; 4.5 AEROSOL RESPIRATORY (INHALATION) at 21:47

## 2023-12-11 RX ADMIN — MUPIROCIN 1 APPLICATION: 20 OINTMENT TOPICAL at 23:03

## 2023-12-11 RX ADMIN — INSULIN GLARGINE 50 UNITS: 100 INJECTION, SOLUTION SUBCUTANEOUS at 23:03

## 2023-12-11 RX ADMIN — MUPIROCIN 1 APPLICATION: 20 OINTMENT TOPICAL at 08:57

## 2023-12-11 RX ADMIN — INSULIN LISPRO 12 UNITS: 100 INJECTION, SOLUTION INTRAVENOUS; SUBCUTANEOUS at 11:49

## 2023-12-11 RX ADMIN — METOPROLOL TARTRATE 100 MG: 50 TABLET, FILM COATED ORAL at 23:02

## 2023-12-11 RX ADMIN — Medication 4 ML: at 21:47

## 2023-12-11 RX ADMIN — FINASTERIDE 5 MG: 5 TABLET, FILM COATED ORAL at 08:57

## 2023-12-11 RX ADMIN — INSULIN LISPRO 10 UNITS: 100 INJECTION, SOLUTION INTRAVENOUS; SUBCUTANEOUS at 09:15

## 2023-12-11 RX ADMIN — ZINC OXIDE 1 APPLICATION: 200 OINTMENT TOPICAL at 23:03

## 2023-12-11 RX ADMIN — MONTELUKAST SODIUM 10 MG: 10 TABLET, FILM COATED ORAL at 08:57

## 2023-12-11 RX ADMIN — Medication 4 ML: at 07:32

## 2023-12-11 RX ADMIN — ATORVASTATIN CALCIUM 20 MG: 20 TABLET, FILM COATED ORAL at 23:02

## 2023-12-11 NOTE — PLAN OF CARE
Goal Outcome Evaluation:      A/O x4, on baseline of 2L NC, NSR on monitor. Elevated blood sugars this AM,  MD aware and insulin orders modified. No c/o pain. Reports SOA on exertion. Up in chair most of the day. VSS.

## 2023-12-11 NOTE — PROGRESS NOTES
[facility] Progress Note      Patient Name: Preston Wallis  : 1965  MRN: [id number]  Primary Care Physician:  Kimmy Riley MD  Date of admission: 2023    Subjective     Interval History:   Follow-up acute kidney injury on chronic kidney disease    The patient is feeling better, no chest pain or shortness of air, no orthopnea or PND, no nausea or vomiting, has Doyle catheter anchored in  Review of Systems:   As noted above    Objective     Vitals:   Temp:  [97.6 °F (36.4 °C)-98.6 °F (37 °C)] 97.9 °F (36.6 °C)  Heart Rate:  [] 104  Resp:  [16-18] 16  BP: (121-175)/(54-85) 159/85  Flow (L/min):  [2] 2    Intake/Output Summary (Last 24 hours) at 2023 0957  Last data filed at 2023 0037  Gross per 24 hour   Intake --   Output 2150 ml   Net -2150 ml       Physical Exam:    General Appearance: alert, awake, chronically ill, obese, no acute distress  Skin: warm and dry  HEENT: oral mucosa normal, nonicteric sclera  Neck: supple, no JVD  Lungs: Soft wheezes, scattered rhonchi, not labored on 2 L/min  Heart: RR, tachycardic, normal S1 and S2, no S3, no rub  Abdomen: soft, nontender, distended, BS +  : Doyle catheter anchored in place  Extremities: 1+ pedal and pretibial edema.  Neuro: normal speech and mental status     Scheduled Meds:     atorvastatin, 20 mg, Oral, Nightly  budesonide-formoterol, 2 puff, Inhalation, BID - RT  docusate sodium, 100 mg, Oral, Daily  famotidine, 40 mg, Oral, QAM  finasteride, 5 mg, Oral, Daily  gabapentin, 300 mg, Oral, Q8H  heparin (porcine), 5,000 Units, Subcutaneous, Q8H  hydrocortisone-bacitracin-zinc oxide-nystatin, 1 application , Topical, Q12H  insulin glargine, 40 Units, Subcutaneous, Nightly  insulin lispro, 3-14 Units, Subcutaneous, 4x Daily AC & at Bedtime  ipratropium-albuterol, 3 mL, Nebulization, Q4H - RT  metoprolol tartrate, 100 mg, Oral, BID  montelukast, 10 mg, Oral, Daily  mupirocin, 1 application , Topical,  Q12H  piperacillin-tazobactam, 4.5 g, Intravenous, Q8H  senna-docusate sodium, 2 tablet, Oral, BID  sodium chloride, 4 mL, Nebulization, BID - RT      IV Meds:        Results Reviewed:   I have personally reviewed the results from the time of this admission to 12/11/2023 09:57 EST     Results from last 7 days   Lab Units 12/11/23  0849 12/10/23  0608 12/09/23  0624 12/08/23  0657   SODIUM mmol/L 138 136 137 141   POTASSIUM mmol/L 4.9 5.0 4.2 4.2   CHLORIDE mmol/L 101 99 98 102   CO2 mmol/L 25.5 26.0 26.7 27.0   BUN mg/dL 31* 29* 26* 35*   CREATININE mg/dL 2.10* 2.27* 2.36* 2.71*   CALCIUM mg/dL 9.2 9.0 9.0 9.0   BILIRUBIN mg/dL  --  <0.2 0.2 0.2   ALK PHOS U/L  --  138* 132* 130*   ALT (SGPT) U/L  --  24 27 27   AST (SGOT) U/L  --  20 21 23   GLUCOSE mg/dL 396* 270* 252* 167*       Estimated Creatinine Clearance: 50.3 mL/min (A) (by C-G formula based on SCr of 2.1 mg/dL (H)).    Results from last 7 days   Lab Units 12/11/23  0849 12/11/23  0553 12/10/23  0608 12/09/23  0624 12/08/23  0657   MAGNESIUM mg/dL  --  2.0 2.0 2.1 2.4   PHOSPHORUS mg/dL 3.2  --   --  3.1 3.9       Results from last 7 days   Lab Units 12/09/23  0624 12/08/23  0657   URIC ACID mg/dL 4.7 5.7       Results from last 7 days   Lab Units 12/11/23  0553 12/10/23  0608 12/09/23  0624 12/08/23  0657 12/07/23  0833   WBC 10*3/mm3 14.03* 15.63* 17.71* 20.64* 21.60*   HEMOGLOBIN g/dL 7.6* 8.3* 8.1* 8.3* 6.9*   PLATELETS 10*3/mm3 453* 460* 463* 466* 474*       Results from last 7 days   Lab Units 12/06/23  2132   INR  1.34*       Assessment / Plan     ASSESSMENT:  ERIC on CKD3b, nonoliguric, improving slowly:  multifactorial; improving volume excess; stable electrolytes, slowly improving creatinine today down to 2.1 and the electrolyte within acceptable range  CKD 3b, secondary to diabetic nephropathy; baseline creatinine 1.6-1.8; subnephrotic proteinuria, with urine protein to creatinine ratio 2.5 g/g on 12/7/2023.  Bronchiectasis with pneumonia, being  evaluated by ID  Diabetes mellitus type 2 with renal complication; + neuropathy; also has gastroparesis  Neurogenic bladder, requiring self-catheterization; currently has Doyle catheter anchored   Severe anemia  Morbid obesity  Hypertension with chronic kidney disease, reasonably controlled  Chronic lung disease on continuous O2    PLAN:  Continue holding diuretic as he is auto-diuresing  Surveillance labs    I reviewed the chart and other providers notes, reviewed labs.  I discussed the case with the patient  Copied text in this note has been reviewed and is accurate as of 12/11/23.       Thank you for involving us in the care of Preston Wallis.  Please feel free to call with any questions.    Buddy Ignacio MD  12/11/23  09:57 [facility]    [facility]  [phone]    Please note that portions of this note were completed with a voice recognition program.

## 2023-12-11 NOTE — THERAPY TREATMENT NOTE
Patient Name: Preston Wallis  : 1965    MRN: [id number]                              Today's Date: 2023       Admit Date: 2023    Visit Dx: No diagnosis found.  Patient Active Problem List   Diagnosis    Polyneuropathy    Paroxysmal atrial fibrillation    Obstructive sleep apnea    MRSA pneumonia    Low back pain    Chronic diastolic heart failure    Allergies    COPD exacerbation    Chronic anticoagulation    Benign prostatic hyperplasia    Impaired mobility and endurance    Stage 3a chronic kidney disease    Iron deficiency anemia secondary to inadequate dietary iron intake    Vitamin D deficiency    Class 3 severe obesity with serious comorbidity in adult    Lower extremity edema    Elevated alkaline phosphatase level    Venous insufficiency (chronic) (peripheral)    Tobacco abuse, in remission    History of Pseudomonas pneumonia    Chronic dyspnea    Gastroesophageal reflux disease    Bronchiectasis without complication    ERIC (acute kidney injury)    Altered mental status    Hyperlipidemia    Luetscher's syndrome    Neurogenic bladder    Class 1 obesity    Pneumonia due to Pseudomonas species    Seizures    Primary osteoarthritis of left knee    Other constipation    Chronic obstructive pulmonary disease    Type 2 DM with CKD stage 3 and hypertension    Essential hypertension    Stage 3b chronic kidney disease    Annual physical exam    Long-term use of high-risk medication    Personal history of PE (pulmonary embolism)    Encounter for aftercare for healing closed traumatic fracture of left femur    Chronic pain of left knee    Primary osteoarthritis of right knee    Sepsis    Bronchiectasis    Bacterial pneumonia    Anemia     Past Medical History:   Diagnosis Date    Age-related cognitive decline     Allergic contact dermatitis     Allergies     Anemia     Bronchiectasis with acute lower respiratory infection     Charcot foot due to diabetes mellitus 9/10/2013    Chronic diastolic  (congestive) heart failure     Chronic kidney disease     Chronic respiratory failure with hypoxia     Closed supracondylar fracture of femur 1/12/2022    COPD (chronic obstructive pulmonary disease)     Deep vein thrombosis (DVT) of lower extremity associated with air travel 1/13/2023    Dependence on supplemental oxygen     Eczema     Erectile dysfunction     due to organic reasons    Essential (primary) hypertension     Fracture     closed fracture of other tarsal and metatarsal bones    Fracture of proximal humerus 1/13/2023    GERD without esophagitis     High risk medication use     Hypercholesteremia     Hypomagnesemia     Infected stasis ulcer of left lower extremity 1/13/2023    Insomnia     Low back pain     Major depressive disorder     Morbid (severe) obesity due to excess calories     MRSA pneumonia     Muscle weakness     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 without evidence of necrosis     Obstructive sleep apnea (adult) (pediatric)     Other forms of dyspnea     Other long term (current) drug therapy     Other specified noninfective gastroenteritis and colitis     Other spondylosis, lumbar region     Pain in both knees     Paroxysmal atrial fibrillation     Peripheral neuropathy     attributed to type 2 diabetes    Pneumonia, unspecified organism     Polyneuropathy     Rash and other nonspecific skin eruption     Syncope and collapse     Tachycardia     Tinnitus 1/13/2023    Type 1 diabetes mellitus with diabetic chronic kidney disease     Type 2 diabetes mellitus     Unspecified fall, initial encounter     Urinary retention      Past Surgical History:   Procedure Laterality Date    CHOLECYSTECTOMY      CYSTOSCOPY      FEMUR SURGERY Left     Shravan placed    KNEE SURGERY Left     OTHER SURGICAL HISTORY Left     venous port    TONSILLECTOMY AND ADENOIDECTOMY        General Information       Row Name 12/11/23 0919          Physical Therapy Time and Intention     Document Type therapy note (daily note)  -PC     Mode of Treatment physical therapy  -PC       Row Name 12/11/23 1530          General Information    Patient Profile Reviewed yes  -PC     Existing Precautions/Restrictions fall;oxygen therapy device and L/min  -PC       Row Name 12/11/23 1530          Cognition    Orientation Status (Cognition) oriented x 4  -PC       Row Name 12/11/23 1530          Safety Issues, Functional Mobility    Impairments Affecting Function (Mobility) balance;endurance/activity tolerance;strength;shortness of breath  -PC               User Key  (r) = Recorded By, (t) = Taken By, (c) = Cosigned By      Initials Name Provider Type    PC Flower Pollock, PT Physical Therapist                   Mobility       Row Name 12/11/23 1530          Bed Mobility    Comment, (Bed Mobility) pt is up in a chair upon entry  -PC       Row Name 12/11/23 1530          Sit-Stand Transfer    Sit-Stand [place] (Transfers) standby assist  -PC     Assistive Device (Sit-Stand Transfers) walker, front-wheeled  -PC     Comment, (Sit-Stand Transfer) sit to stand x 3, pt was able to work on weight shifting, he could lift L leg up in a partial march, but did not feel comfortable lifting R leg off floor due to R shoulder weakness and did not feel like it would support him  -PC               User Key  (r) = Recorded By, (t) = Taken By, (c) = Cosigned By      Initials Name Provider Type    PC Flower Pollock PT Physical Therapist                   Obj/Interventions       Row Name 12/11/23 1532          Motor Skills    Therapeutic Exercise --  10 reps AP, LAQ  -PC               User Key  (r) = Recorded By, (t) = Taken By, (c) = Cosigned By      Initials Name Provider Type    PC Flower Pollock PT Physical Therapist                   Goals/Plan    No documentation.                  Clinical Impression       Row Name 12/11/23 1532          Pain    Pretreatment Pain Rating 0/10 - no pain  -PC       Row Name 12/11/23  1532          Plan of Care Review    Plan of Care Reviewed With patient  -PC     Progress improving  -PC     Outcome Evaluation Pt up in a chair, worked on sit to stand transfers, standing tolerance and balance with weight shifting, marching, pt did well with sit to stand, at a stand by assist level, pt is nearing his baseline and is appropriate to return home with home health  -PC       Row Name 12/11/23 1532          Positioning and Restraints    Pre-Treatment Position sitting in chair/recliner  -PC     Post Treatment Position chair  -PC     In Chair sitting;call light within reach;encouraged to call for assist  -PC               User Key  (r) = Recorded By, (t) = Taken By, (c) = Cosigned By      Initials Name Provider Type    PC Flower Pollock, PT Physical Therapist                   Outcome Measures       Row Name 12/11/23 1534 12/11/23 0856       How much help from another person do you currently need...    Turning from your back to your side while in flat bed without using bedrails? 3  -PC 3  -KE    Moving from lying on back to sitting on the side of a flat bed without bedrails? 3  -PC 3  -KE    Moving to and from a bed to a chair (including a wheelchair)? 3  -PC 2  -KE    Standing up from a chair using your arms (e.g., wheelchair, bedside chair)? 3  -PC 2  -KE    Climbing 3-5 steps with a railing? 1  -PC 1  -KE    To walk in hospital room? 1  -PC 2  -KE    AM-PAC 6 Clicks Score (PT) 14  -PC 13  -KE    Highest Level of Mobility Goal 4 --> Transfer to chair/commode  -PC 4 --> Transfer to chair/commode  -KE              User Key  (r) = Recorded By, (t) = Taken By, (c) = Cosigned By      Initials Name Provider Type    PC Flower Pollock, PT Physical Therapist    Oliva Lombardi, RN Registered Nurse                                 Physical Therapy Education       Title: PT OT SLP Therapies (In Progress)       Topic: Physical Therapy (Done)       Point: Mobility training (Done)       Learning Progress Summary              Patient Acceptance, E,D, DU by  at 12/11/2023 1534    Acceptance, E, VU by ER at 12/7/2023 1144                         Point: Home exercise program (Done)       Learning Progress Summary             Patient Acceptance, E,D, DU by  at 12/11/2023 1534    Acceptance, E,TB,D, VU,NR by  at 12/8/2023 1722    Acceptance, E, VU by ER at 12/7/2023 1144                         Point: Body mechanics (Done)       Learning Progress Summary             Patient Acceptance, E, VU by ER at 12/7/2023 1144                         Point: Precautions (Done)       Learning Progress Summary             Patient Acceptance, E, VU by ER at 12/7/2023 1144                                         User Key       Initials Effective Dates Name Provider Type Discipline     06/16/21 -  Flower Pollock, PT Physical Therapist PT     04/08/22 -  Lizet Mckeon PT Physical Therapist PT    ER 10/15/23 -  Cat Burris, PT Physical Therapist PT                  PT Recommendation and Plan     Plan of Care Reviewed With: patient  Progress: improving  Outcome Evaluation: Pt up in a chair, worked on sit to stand transfers, standing tolerance and balance with weight shifting, marching, pt did well with sit to stand, at a stand by assist level, pt is nearing his baseline and is appropriate to return home with home health     Time Calculation:         PT Charges       Row Name 12/11/23 1535             Time Calculation    Start Time 1506  -PC      Stop Time 1520  -PC      Time Calculation (min) 14 min  -PC      PT Received On 12/11/23  -PC      PT - Next Appointment 12/12/23  -PC                User Key  (r) = Recorded By, (t) = Taken By, (c) = Cosigned By      Initials Name Provider Type    PC Flower Pollock, PT Physical Therapist                  Therapy Charges for Today       Code Description Service Date Service Provider Modifiers Qty    55230206379 HC PT THER PROC EA 15 MIN 12/11/2023 Flower Pollock, PT GP 1            PT  G-Codes  Outcome Measure Options: AM-PAC 6 Clicks Basic Mobility (PT)  AM-PAC 6 Clicks Score (PT): 14  AM-PAC 6 Clicks Score (OT): 14  PT Discharge Summary  Anticipated Discharge Disposition (PT): home with assist, home with home health    Flower Pollock, PT  12/11/2023

## 2023-12-11 NOTE — PLAN OF CARE
Goal Outcome Evaluation:  Plan of Care Reviewed With: patient        Progress: improving  Outcome Evaluation: Pt up in a chair, worked on sit to stand transfers, standing tolerance and balance with weight shifting, marching, pt did well with sit to stand, at a stand by assist level, pt is nearing his baseline and is appropriate to return home with home health      Anticipated Discharge Disposition (PT): home with assist, home with home health

## 2023-12-11 NOTE — PROGRESS NOTES
[facility] Progress Note      Patient Name: Preston Wallis  : 1965  MRN: [id number]  Primary Care Physician:  Kimmy Riley MD  Date of admission: 2023    Subjective     Interval History:   Follow-up acute kidney injury on chronic kidney disease    Breathing is comfortable on 2 L/min (his home dose); no chest pain  Eating fine; no N/V; abdominal pain controlled  UOP yesterday 3.4 L    Review of Systems:   As noted above    Objective     Vitals:   Temp:  [97.6 °F (36.4 °C)-98.1 °F (36.7 °C)] 97.9 °F (36.6 °C)  Heart Rate:  [71-90] 90  Resp:  [18-20] 18  BP: (121-175)/() 175/78  Flow (L/min):  [2] 2    Intake/Output Summary (Last 24 hours) at 12/10/2023 2050  Last data filed at 12/10/2023 1356  Gross per 24 hour   Intake 840 ml   Output 1900 ml   Net -1060 ml       Physical Exam:    General Appearance: alert, awake, chronically ill, obese, NAD  Skin: warm and dry  HEENT: oral mucosa normal, nonicteric sclera  Neck: supple, no JVD  Lungs: Soft wheezes, scattered rhonchi, not labored on 2 L/min  Heart: RR, tachycardic, normal S1 and S2, no S3, no rub  Abdomen: soft, nontender, distended, BS +  : Doyle catheter anchored in place  Extremities: 1+ pedal and pretibial edema.  Neuro: normal speech and mental status     Scheduled Meds:     atorvastatin, 20 mg, Oral, Nightly  budesonide-formoterol, 2 puff, Inhalation, BID - RT  docusate sodium, 100 mg, Oral, Daily  famotidine, 40 mg, Oral, QAM  finasteride, 5 mg, Oral, Daily  gabapentin, 300 mg, Oral, Q8H  heparin (porcine), 5,000 Units, Subcutaneous, Q8H  hydrocortisone-bacitracin-zinc oxide-nystatin, 1 application , Topical, Q12H  insulin glargine, 40 Units, Subcutaneous, Nightly  insulin lispro, 3-14 Units, Subcutaneous, 4x Daily AC & at Bedtime  ipratropium-albuterol, 3 mL, Nebulization, Q4H - RT  metoprolol tartrate, 100 mg, Oral, BID  montelukast, 10 mg, Oral, Daily  mupirocin, 1 application , Topical,  Q12H  piperacillin-tazobactam, 4.5 g, Intravenous, Q8H  senna-docusate sodium, 2 tablet, Oral, BID  sodium chloride, 4 mL, Nebulization, BID - RT      IV Meds:        Results Reviewed:   I have personally reviewed the results from the time of this admission to 12/10/2023 20:50 EST     Results from last 7 days   Lab Units 12/10/23  0608 12/09/23  0624 12/08/23  0657   SODIUM mmol/L 136 137 141   POTASSIUM mmol/L 5.0 4.2 4.2   CHLORIDE mmol/L 99 98 102   CO2 mmol/L 26.0 26.7 27.0   BUN mg/dL 29* 26* 35*   CREATININE mg/dL 2.27* 2.36* 2.71*   CALCIUM mg/dL 9.0 9.0 9.0   BILIRUBIN mg/dL <0.2 0.2 0.2   ALK PHOS U/L 138* 132* 130*   ALT (SGPT) U/L 24 27 27   AST (SGOT) U/L 20 21 23   GLUCOSE mg/dL 270* 252* 167*       Estimated Creatinine Clearance: 46.6 mL/min (A) (by C-G formula based on SCr of 2.27 mg/dL (H)).    Results from last 7 days   Lab Units 12/10/23  0608 12/09/23  0624 12/08/23  0657   MAGNESIUM mg/dL 2.0 2.1 2.4   PHOSPHORUS mg/dL  --  3.1 3.9       Results from last 7 days   Lab Units 12/09/23  0624 12/08/23  0657   URIC ACID mg/dL 4.7 5.7       Results from last 7 days   Lab Units 12/10/23  0608 12/09/23  0624 12/08/23  0657 12/07/23  0833 12/06/23  2132   WBC 10*3/mm3 15.63* 17.71* 20.64* 21.60* 24.82*   HEMOGLOBIN g/dL 8.3* 8.1* 8.3* 6.9* 7.7*   PLATELETS 10*3/mm3 460* 463* 466* 474* 435       Results from last 7 days   Lab Units 12/06/23  2132   INR  1.34*       Assessment / Plan     ASSESSMENT:  ERIC on CKD3b, nonoliguric, improving slowly:  multifactorial; improving volume excess; stable electrolytes  CKD 3b, secondary to diabetic nephropathy; baseline creatinine 1.6-1.8; subnephrotic proteinuria, with urine protein to creatinine ratio 2.5 g/g on 12/7/2023.  Bronchiectasis with pneumonia, being evaluated by ID  Diabetes mellitus type 2 with renal complication; + neuropathy; also has gastroparesis  Neurogenic bladder, requiring self-catheterization; currently has Doyle catheter anchored   Severe  anemia  Morbid obesity  Hypertension with chronic kidney disease, reasonably controlled  Chronic lung disease on continuous O2    PLAN:  Continue holding diuretic as he is auto-diuresing  Surveillance labs      Thank you for involving us in the care of Preston Wallis.  Please feel free to call with any questions.    Deshawn Turcios MD  12/10/23  20:50 [facility]    [facility]  [phone]    Please note that portions of this note were completed with a voice recognition program.

## 2023-12-11 NOTE — PROGRESS NOTES
Name: Preston Wallis ADMIT: 2023   : 1965  PCP: Kimmy Riley MD    MRN: [id number] LOS: 5 days   AGE/SEX: 58 y.o. male  ROOM: [facility]     Subjective   Subjective   Feeling fine today. Breathing is better--feels it's back to baseline for him. No cough. No SOA at rest. No N/V/D/abd pain. Tolerating diet and eating well. Making urine. No F/C/NS. No CP or palp.       Objective   Objective   Vital Signs  Temp:  [97.6 °F (36.4 °C)-98.6 °F (37 °C)] 97.9 °F (36.6 °C)  Heart Rate:  [] 82  Resp:  [16-18] 16  BP: (136-175)/(54-85) 159/85  SpO2:  [96 %-100 %] 100 %  on  Flow (L/min):  [2] 2;   Device (Oxygen Therapy): nasal cannula  Body mass index is 41.02 kg/m².  Physical Exam  Vitals and nursing note reviewed.   Constitutional:       General: He is not in acute distress.     Appearance: He is obese. He is ill-appearing (chronically). He is not toxic-appearing or diaphoretic.   HENT:      Head: Normocephalic.      Nose: Nose normal.      Mouth/Throat:      Mouth: Mucous membranes are moist.      Pharynx: Oropharynx is clear.   Eyes:      General: No scleral icterus.        Right eye: No discharge.         Left eye: No discharge.      Conjunctiva/sclera: Conjunctivae normal.   Cardiovascular:      Rate and Rhythm: Normal rate and regular rhythm.      Pulses: Normal pulses.   Pulmonary:      Effort: Pulmonary effort is normal. No respiratory distress.      Breath sounds: Normal breath sounds. No wheezing or rales.      Comments: Anteriorly   Abdominal:      General: Bowel sounds are normal. There is no distension.      Palpations: Abdomen is soft.      Tenderness: There is no abdominal tenderness.   Genitourinary:     Comments: Clear yellow urine in lopez bag  Musculoskeletal:         General: Swelling present. Normal range of motion.      Cervical back: Neck supple.   Skin:     General: Skin is warm and dry.      Capillary Refill: Capillary refill takes less than 2 seconds.      Coloration: Skin is  pale. Skin is not jaundiced.   Neurological:      General: No focal deficit present.      Mental Status: He is alert and oriented to person, place, and time. Mental status is at baseline.      Cranial Nerves: No cranial nerve deficit.      Coordination: Coordination normal.   Psychiatric:         Mood and Affect: Mood normal.         Behavior: Behavior normal.         Thought Content: Thought content normal.       Results Review     I reviewed the patient's new clinical results.  Results from last 7 days   Lab Units 12/11/23  0553 12/10/23  0608 12/09/23  0624 12/08/23  0657   WBC 10*3/mm3 14.03* 15.63* 17.71* 20.64*   HEMOGLOBIN g/dL 7.6* 8.3* 8.1* 8.3*   PLATELETS 10*3/mm3 453* 460* 463* 466*     Results from last 7 days   Lab Units 12/11/23  0849 12/10/23  0608 12/09/23  0624 12/08/23  0657   SODIUM mmol/L 138 136 137 141   POTASSIUM mmol/L 4.9 5.0 4.2 4.2   CHLORIDE mmol/L 101 99 98 102   CO2 mmol/L 25.5 26.0 26.7 27.0   BUN mg/dL 31* 29* 26* 35*   CREATININE mg/dL 2.10* 2.27* 2.36* 2.71*   GLUCOSE mg/dL 396* 270* 252* 167*   EGFR mL/min/1.73 35.8* 32.6* 31.1* 26.4*     Results from last 7 days   Lab Units 12/11/23  0849 12/10/23  0608 12/09/23  0624 12/08/23  0657 12/06/23  2132   ALBUMIN g/dL 3.0* 3.1* 3.1* 3.1* 2.8*   BILIRUBIN mg/dL  --  <0.2 0.2 0.2 0.3   ALK PHOS U/L  --  138* 132* 130* 124*   AST (SGOT) U/L  --  20 21 23 17   ALT (SGPT) U/L  --  24 27 27 22     Results from last 7 days   Lab Units 12/11/23  0849 12/11/23  0553 12/10/23  0608 12/09/23  0624 12/08/23  0657   CALCIUM mg/dL 9.2  --  9.0 9.0 9.0   ALBUMIN g/dL 3.0*  --  3.1* 3.1* 3.1*   MAGNESIUM mg/dL  --  2.0 2.0 2.1 2.4   PHOSPHORUS mg/dL 3.2  --   --  3.1 3.9     Results from last 7 days   Lab Units 12/06/23  2132   PROCALCITONIN ng/mL 1.33*   LACTATE mmol/L 1.3     Glucose   Date/Time Value Ref Range Status   12/11/2023 1121 375 (H) 70 - 130 mg/dL Final   12/11/2023 0856 412 (C) 70 - 130 mg/dL Final   12/11/2023 0751 474 (C) 70 - 130  mg/dL Final   12/11/2023 0747 458 (C) 70 - 130 mg/dL Final   12/11/2023 0635 442 (C) 70 - 130 mg/dL Final   12/10/2023 2004 341 (H) 70 - 130 mg/dL Final   12/10/2023 1611 392 (H) 70 - 130 mg/dL Final       No radiology results for the last day    I have personally reviewed all medications:  Scheduled Medications  atorvastatin, 20 mg, Oral, Nightly  budesonide-formoterol, 2 puff, Inhalation, BID - RT  docusate sodium, 100 mg, Oral, Daily  famotidine, 40 mg, Oral, QAM  finasteride, 5 mg, Oral, Daily  gabapentin, 300 mg, Oral, Q8H  heparin (porcine), 5,000 Units, Subcutaneous, Q8H  hydrocortisone-bacitracin-zinc oxide-nystatin, 1 application , Topical, Q12H  insulin glargine, 40 Units, Subcutaneous, Nightly  insulin lispro, 3-14 Units, Subcutaneous, 4x Daily AC & at Bedtime  ipratropium-albuterol, 3 mL, Nebulization, Q4H - RT  metoprolol tartrate, 100 mg, Oral, BID  montelukast, 10 mg, Oral, Daily  mupirocin, 1 application , Topical, Q12H  piperacillin-tazobactam, 4.5 g, Intravenous, Q8H  senna-docusate sodium, 2 tablet, Oral, BID  sodium chloride, 4 mL, Nebulization, BID - RT    Infusions   Diet  Diet: Cardiac Diets, Diabetic Diets; Healthy Heart (2-3 Na+); Consistent Carbohydrate; Texture: Regular Texture (IDDSI 7); Fluid Consistency: Thin (IDDSI 0)    I have personally reviewed:  [x]  Laboratory   [x]  Microbiology   [x]  Radiology   [x]  EKG/Telemetry  []  Cardiology/Vascular   []  Pathology    []  Records       Assessment/Plan     Active Hospital Problems    Diagnosis  POA    **Bacterial pneumonia [J15.9]  Yes    Bronchiectasis [J47.9]  Yes    Anemia [D64.9]  Yes    Sepsis [A41.9]  Yes    Essential hypertension [I10]  Yes    Chronic obstructive pulmonary disease [J44.9]  Yes    Stage 3b chronic kidney disease [N18.32]  Yes    Neurogenic bladder [N31.9]  Yes    Hyperlipidemia [E78.5]  Yes    Paroxysmal atrial fibrillation [I48.0]  Yes    Obstructive sleep apnea [G47.33]  Yes    Chronic diastolic heart failure  [I50.32]  Yes    ERIC (acute kidney injury) [N17.9]  Yes      Resolved Hospital Problems   No resolved problems to display.       57yo gentleman, former smoker, with COPD, chronic bronchiectasis, CHRF, MILADY, chronic diastolic CHF, CKD3b, DM2, HTN, HLD, PAF (Eliquis), neurogenic bladder, morbid obesity, and prior h/o both MRSA PNA and Pseudomonas PNA, who was admitted to [facility] on 11/28 for RLL PNA. He was initially treated with Vanc/Cefepime and MRSA screen was positive. Sputum culture grew Achromobacter xylosoxidans on 12/3 and he was changed to Zosyn/Bactrim. Despite this he had worsening of O2 requirements and persistent leukocytosis. Renal function worsened so Bactrim was changed to Zyvox and arrangements were made to transfer him to [facility] so that he could evaluated by Infectious Disease, Pulm, and Renal services.     RLL PNA (MRSA and Achromobacter)  Acute on chronic hypoxic resp failure  Pulm and ID following  Afebrile and VSS, stable on 2L/min (baseline)  WBC falling  ID has changed Zyvox to Zosyn  Recommending abx through 12/14  CT chest c/w infections--both old and acute, no mass seen  Will need outpt f/u with Pulm for re-imaging  Continue pulm hygiene efforts with hypertonic saline nebs     COPD  Chronic bronchiectasis  Appreciate Pulm attention to pt  Supplemental O2 stable at 2L/min (baseline)  Treating PNA  Scheduled DuoNebs and Symbicort, Singulair     ERIC/CKD3b  Appreciate Renal attention to pt  Cr sl better again today  Holding diuretic, auto-diuresing     Anemia NOS  Hgb improved after a unit of PRBCs 12/7 but down some again today  No evidence of bleeding   Continue to monitor Hgb and transfuse if drops below 7.0  Labs c/w chronic disease and mild iron def     Abd wall cellulitis/abscess  Resolved after I&D by Surg at [facility]  Topical Mupirocin  Had some bleeding after at [facility] so Eliquis on hold  On SQ Heparin for DVT ppx, watch for recurrence of bleeding--none so far     PAF  (Eliquis)  NSR on EKG here  HRs acceptable on metoprolol and BPs tolerating  Eliquis on hold due to bleeding from abd wall I&D site (now resolved)--consider restarting AC      Chronic diastolic CHF  Holding Bumex due to ERIC  Defer volume status to Renal for now  Auto-diuresing     DM2  A1c 8.4  Continue Lantus/SSI   Sugars high  Increase Lantus tonight to 50 units  Holding Bydureon and Farxiga     HTN  BPs acceptable if a bit robust at times  Continue metoprolol     Neurogenic bladder  Chronic urinary retention  Pt self-caths 4 times/day normally  Doyle cath in place for now     Morbid obesity  Complicating all aspects of care     Bedbug infestation    Heparin SC for DVT prophylaxis.  Full code.  Discussed with patient. D/w RN, CCP, and Pharm at morning huddle.  Anticipate discharge TBD        Iam Eller MD  [facility]  12/11/23  12:42 EST

## 2023-12-12 LAB
ALBUMIN SERPL-MCNC: 3.3 G/DL (ref 3.5–5.2)
ANION GAP SERPL CALCULATED.3IONS-SCNC: 9 MMOL/L (ref 5–15)
BASOPHILS # BLD AUTO: 0.05 10*3/MM3 (ref 0–0.2)
BASOPHILS NFR BLD AUTO: 0.4 % (ref 0–1.5)
BUN SERPL-MCNC: 27 MG/DL (ref 6–20)
BUN/CREAT SERPL: 14.5 (ref 7–25)
CALCIUM SPEC-SCNC: 9.4 MG/DL (ref 8.6–10.5)
CHLORIDE SERPL-SCNC: 101 MMOL/L (ref 98–107)
CO2 SERPL-SCNC: 27 MMOL/L (ref 22–29)
CREAT SERPL-MCNC: 1.86 MG/DL (ref 0.76–1.27)
DEPRECATED RDW RBC AUTO: 46.2 FL (ref 37–54)
EGFRCR SERPLBLD CKD-EPI 2021: 41.4 ML/MIN/1.73
EOSINOPHIL # BLD AUTO: 0.58 10*3/MM3 (ref 0–0.4)
EOSINOPHIL NFR BLD AUTO: 4.2 % (ref 0.3–6.2)
ERYTHROCYTE [DISTWIDTH] IN BLOOD BY AUTOMATED COUNT: 14.6 % (ref 12.3–15.4)
GLUCOSE BLDC GLUCOMTR-MCNC: 303 MG/DL (ref 70–130)
GLUCOSE BLDC GLUCOMTR-MCNC: 320 MG/DL (ref 70–130)
GLUCOSE BLDC GLUCOMTR-MCNC: 327 MG/DL (ref 70–130)
GLUCOSE BLDC GLUCOMTR-MCNC: 362 MG/DL (ref 70–130)
GLUCOSE SERPL-MCNC: 317 MG/DL (ref 65–99)
HCT VFR BLD AUTO: 24.8 % (ref 37.5–51)
HGB BLD-MCNC: 8.1 G/DL (ref 13–17.7)
IMM GRANULOCYTES # BLD AUTO: 0.15 10*3/MM3 (ref 0–0.05)
IMM GRANULOCYTES NFR BLD AUTO: 1.1 % (ref 0–0.5)
LYMPHOCYTES # BLD AUTO: 2.76 10*3/MM3 (ref 0.7–3.1)
LYMPHOCYTES NFR BLD AUTO: 20 % (ref 19.6–45.3)
MAGNESIUM SERPL-MCNC: 2 MG/DL (ref 1.6–2.6)
MCH RBC QN AUTO: 29.2 PG (ref 26.6–33)
MCHC RBC AUTO-ENTMCNC: 32.7 G/DL (ref 31.5–35.7)
MCV RBC AUTO: 89.5 FL (ref 79–97)
MONOCYTES # BLD AUTO: 0.92 10*3/MM3 (ref 0.1–0.9)
MONOCYTES NFR BLD AUTO: 6.7 % (ref 5–12)
NEUTROPHILS NFR BLD AUTO: 67.6 % (ref 42.7–76)
NEUTROPHILS NFR BLD AUTO: 9.37 10*3/MM3 (ref 1.7–7)
NRBC BLD AUTO-RTO: 0 /100 WBC (ref 0–0.2)
PHOSPHATE SERPL-MCNC: 3.6 MG/DL (ref 2.5–4.5)
PLATELET # BLD AUTO: 474 10*3/MM3 (ref 140–450)
PMV BLD AUTO: 9.8 FL (ref 6–12)
POTASSIUM SERPL-SCNC: 4.7 MMOL/L (ref 3.5–5.2)
RBC # BLD AUTO: 2.77 10*6/MM3 (ref 4.14–5.8)
SODIUM SERPL-SCNC: 137 MMOL/L (ref 136–145)
URATE SERPL-MCNC: 4.1 MG/DL (ref 3.4–7)
WBC NRBC COR # BLD AUTO: 13.83 10*3/MM3 (ref 3.4–10.8)

## 2023-12-12 PROCEDURE — 63710000001 INSULIN GLARGINE PER 5 UNITS: Performed by: HOSPITALIST

## 2023-12-12 PROCEDURE — 80069 RENAL FUNCTION PANEL: CPT | Performed by: INTERNAL MEDICINE

## 2023-12-12 PROCEDURE — 94761 N-INVAS EAR/PLS OXIMETRY MLT: CPT

## 2023-12-12 PROCEDURE — 97110 THERAPEUTIC EXERCISES: CPT

## 2023-12-12 PROCEDURE — 94799 UNLISTED PULMONARY SVC/PX: CPT

## 2023-12-12 PROCEDURE — 84550 ASSAY OF BLOOD/URIC ACID: CPT | Performed by: INTERNAL MEDICINE

## 2023-12-12 PROCEDURE — 25010000002 HEPARIN (PORCINE) PER 1000 UNITS: Performed by: HOSPITALIST

## 2023-12-12 PROCEDURE — 85025 COMPLETE CBC W/AUTO DIFF WBC: CPT | Performed by: INTERNAL MEDICINE

## 2023-12-12 PROCEDURE — 94664 DEMO&/EVAL PT USE INHALER: CPT

## 2023-12-12 PROCEDURE — 82948 REAGENT STRIP/BLOOD GLUCOSE: CPT

## 2023-12-12 PROCEDURE — 83735 ASSAY OF MAGNESIUM: CPT | Performed by: INTERNAL MEDICINE

## 2023-12-12 PROCEDURE — 63710000001 INSULIN LISPRO (HUMAN) PER 5 UNITS: Performed by: HOSPITALIST

## 2023-12-12 PROCEDURE — 25010000002 PIPERACILLIN SOD-TAZOBACTAM PER 1 G: Performed by: STUDENT IN AN ORGANIZED HEALTH CARE EDUCATION/TRAINING PROGRAM

## 2023-12-12 PROCEDURE — 94760 N-INVAS EAR/PLS OXIMETRY 1: CPT

## 2023-12-12 RX ORDER — NIFEDIPINE 30 MG/1
30 TABLET, EXTENDED RELEASE ORAL
Status: DISCONTINUED | OUTPATIENT
Start: 2023-12-12 | End: 2023-12-14 | Stop reason: HOSPADM

## 2023-12-12 RX ORDER — INSULIN GLARGINE 100 [IU]/ML
40 INJECTION, SOLUTION SUBCUTANEOUS DAILY
Qty: 15 ML | Refills: 1 | Status: SHIPPED | OUTPATIENT
Start: 2023-12-12 | End: 2023-12-14 | Stop reason: SDUPTHER

## 2023-12-12 RX ORDER — INSULIN LISPRO 100 [IU]/ML
4-24 INJECTION, SOLUTION INTRAVENOUS; SUBCUTANEOUS
Status: DISCONTINUED | OUTPATIENT
Start: 2023-12-12 | End: 2023-12-13

## 2023-12-12 RX ADMIN — FAMOTIDINE 40 MG: 20 TABLET ORAL at 06:00

## 2023-12-12 RX ADMIN — BUDESONIDE AND FORMOTEROL FUMARATE DIHYDRATE 2 PUFF: 160; 4.5 AEROSOL RESPIRATORY (INHALATION) at 19:25

## 2023-12-12 RX ADMIN — IPRATROPIUM BROMIDE AND ALBUTEROL SULFATE 3 ML: 2.5; .5 SOLUTION RESPIRATORY (INHALATION) at 06:48

## 2023-12-12 RX ADMIN — INSULIN LISPRO 10 UNITS: 100 INJECTION, SOLUTION INTRAVENOUS; SUBCUTANEOUS at 12:17

## 2023-12-12 RX ADMIN — BUDESONIDE AND FORMOTEROL FUMARATE DIHYDRATE 2 PUFF: 160; 4.5 AEROSOL RESPIRATORY (INHALATION) at 06:58

## 2023-12-12 RX ADMIN — NIFEDIPINE 30 MG: 30 TABLET, FILM COATED, EXTENDED RELEASE ORAL at 13:43

## 2023-12-12 RX ADMIN — IPRATROPIUM BROMIDE AND ALBUTEROL SULFATE 3 ML: 2.5; .5 SOLUTION RESPIRATORY (INHALATION) at 19:24

## 2023-12-12 RX ADMIN — ZINC OXIDE 1 APPLICATION: 200 OINTMENT TOPICAL at 09:01

## 2023-12-12 RX ADMIN — IPRATROPIUM BROMIDE AND ALBUTEROL SULFATE 3 ML: 2.5; .5 SOLUTION RESPIRATORY (INHALATION) at 23:25

## 2023-12-12 RX ADMIN — METOPROLOL TARTRATE 100 MG: 50 TABLET, FILM COATED ORAL at 09:01

## 2023-12-12 RX ADMIN — INSULIN LISPRO 10 UNITS: 100 INJECTION, SOLUTION INTRAVENOUS; SUBCUTANEOUS at 06:35

## 2023-12-12 RX ADMIN — HEPARIN SODIUM 5000 UNITS: 5000 INJECTION INTRAVENOUS; SUBCUTANEOUS at 06:00

## 2023-12-12 RX ADMIN — METOPROLOL TARTRATE 100 MG: 50 TABLET, FILM COATED ORAL at 22:39

## 2023-12-12 RX ADMIN — PIPERACILLIN SODIUM AND TAZOBACTAM SODIUM 4.5 G: 4; .5 INJECTION, SOLUTION INTRAVENOUS at 22:45

## 2023-12-12 RX ADMIN — IPRATROPIUM BROMIDE AND ALBUTEROL SULFATE 3 ML: 2.5; .5 SOLUTION RESPIRATORY (INHALATION) at 03:23

## 2023-12-12 RX ADMIN — HEPARIN SODIUM 5000 UNITS: 5000 INJECTION INTRAVENOUS; SUBCUTANEOUS at 13:43

## 2023-12-12 RX ADMIN — INSULIN LISPRO 20 UNITS: 100 INJECTION, SOLUTION INTRAVENOUS; SUBCUTANEOUS at 16:38

## 2023-12-12 RX ADMIN — FINASTERIDE 5 MG: 5 TABLET, FILM COATED ORAL at 09:01

## 2023-12-12 RX ADMIN — Medication 4 ML: at 19:25

## 2023-12-12 RX ADMIN — IPRATROPIUM BROMIDE AND ALBUTEROL SULFATE 3 ML: 2.5; .5 SOLUTION RESPIRATORY (INHALATION) at 11:46

## 2023-12-12 RX ADMIN — INSULIN LISPRO 16 UNITS: 100 INJECTION, SOLUTION INTRAVENOUS; SUBCUTANEOUS at 22:40

## 2023-12-12 RX ADMIN — INSULIN GLARGINE 50 UNITS: 100 INJECTION, SOLUTION SUBCUTANEOUS at 22:40

## 2023-12-12 RX ADMIN — Medication 4 ML: at 06:48

## 2023-12-12 RX ADMIN — GABAPENTIN 300 MG: 300 CAPSULE ORAL at 13:43

## 2023-12-12 RX ADMIN — MUPIROCIN 1 APPLICATION: 20 OINTMENT TOPICAL at 22:39

## 2023-12-12 RX ADMIN — ZINC OXIDE 1 APPLICATION: 200 OINTMENT TOPICAL at 22:39

## 2023-12-12 RX ADMIN — PIPERACILLIN SODIUM AND TAZOBACTAM SODIUM 4.5 G: 4; .5 INJECTION, SOLUTION INTRAVENOUS at 09:01

## 2023-12-12 RX ADMIN — PIPERACILLIN SODIUM AND TAZOBACTAM SODIUM 4.5 G: 4; .5 INJECTION, SOLUTION INTRAVENOUS at 15:37

## 2023-12-12 RX ADMIN — IPRATROPIUM BROMIDE AND ALBUTEROL SULFATE 3 ML: 2.5; .5 SOLUTION RESPIRATORY (INHALATION) at 15:13

## 2023-12-12 RX ADMIN — MUPIROCIN 1 APPLICATION: 20 OINTMENT TOPICAL at 09:01

## 2023-12-12 RX ADMIN — GABAPENTIN 300 MG: 300 CAPSULE ORAL at 06:00

## 2023-12-12 RX ADMIN — HEPARIN SODIUM 5000 UNITS: 5000 INJECTION INTRAVENOUS; SUBCUTANEOUS at 22:39

## 2023-12-12 RX ADMIN — ATORVASTATIN CALCIUM 20 MG: 20 TABLET, FILM COATED ORAL at 22:39

## 2023-12-12 RX ADMIN — GABAPENTIN 300 MG: 300 CAPSULE ORAL at 22:39

## 2023-12-12 RX ADMIN — MONTELUKAST SODIUM 10 MG: 10 TABLET, FILM COATED ORAL at 09:01

## 2023-12-12 NOTE — THERAPY TREATMENT NOTE
Patient Name: Preston Wallis  : 1965    MRN: [id number]                              Today's Date: 2023       Admit Date: 2023    Visit Dx: No diagnosis found.  Patient Active Problem List   Diagnosis    Polyneuropathy    Paroxysmal atrial fibrillation    Obstructive sleep apnea    MRSA pneumonia    Low back pain    Chronic diastolic heart failure    Allergies    COPD exacerbation    Chronic anticoagulation    Benign prostatic hyperplasia    Impaired mobility and endurance    Stage 3a chronic kidney disease    Iron deficiency anemia secondary to inadequate dietary iron intake    Vitamin D deficiency    Class 3 severe obesity with serious comorbidity in adult    Lower extremity edema    Elevated alkaline phosphatase level    Venous insufficiency (chronic) (peripheral)    Tobacco abuse, in remission    History of Pseudomonas pneumonia    Chronic dyspnea    Gastroesophageal reflux disease    Bronchiectasis without complication    ERIC (acute kidney injury)    Altered mental status    Hyperlipidemia    Luetscher's syndrome    Neurogenic bladder    Class 1 obesity    Pneumonia due to Pseudomonas species    Seizures    Primary osteoarthritis of left knee    Other constipation    Chronic obstructive pulmonary disease    Type 2 DM with CKD stage 3 and hypertension    Essential hypertension    Stage 3b chronic kidney disease    Annual physical exam    Long-term use of high-risk medication    Personal history of PE (pulmonary embolism)    Encounter for aftercare for healing closed traumatic fracture of left femur    Chronic pain of left knee    Primary osteoarthritis of right knee    Sepsis    Bronchiectasis    Bacterial pneumonia    Anemia     Past Medical History:   Diagnosis Date    Age-related cognitive decline     Allergic contact dermatitis     Allergies     Anemia     Bronchiectasis with acute lower respiratory infection     Charcot foot due to diabetes mellitus 9/10/2013    Chronic diastolic  (congestive) heart failure     Chronic kidney disease     Chronic respiratory failure with hypoxia     Closed supracondylar fracture of femur 1/12/2022    COPD (chronic obstructive pulmonary disease)     Deep vein thrombosis (DVT) of lower extremity associated with air travel 1/13/2023    Dependence on supplemental oxygen     Eczema     Erectile dysfunction     due to organic reasons    Essential (primary) hypertension     Fracture     closed fracture of other tarsal and metatarsal bones    Fracture of proximal humerus 1/13/2023    GERD without esophagitis     High risk medication use     Hypercholesteremia     Hypomagnesemia     Infected stasis ulcer of left lower extremity 1/13/2023    Insomnia     Low back pain     Major depressive disorder     Morbid (severe) obesity due to excess calories     MRSA pneumonia     Muscle weakness     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 without evidence of necrosis     Obstructive sleep apnea (adult) (pediatric)     Other forms of dyspnea     Other long term (current) drug therapy     Other specified noninfective gastroenteritis and colitis     Other spondylosis, lumbar region     Pain in both knees     Paroxysmal atrial fibrillation     Peripheral neuropathy     attributed to type 2 diabetes    Pneumonia, unspecified organism     Polyneuropathy     Rash and other nonspecific skin eruption     Syncope and collapse     Tachycardia     Tinnitus 1/13/2023    Type 1 diabetes mellitus with diabetic chronic kidney disease     Type 2 diabetes mellitus     Unspecified fall, initial encounter     Urinary retention      Past Surgical History:   Procedure Laterality Date    CHOLECYSTECTOMY      CYSTOSCOPY      FEMUR SURGERY Left     Shravan placed    KNEE SURGERY Left     OTHER SURGICAL HISTORY Left     venous port    TONSILLECTOMY AND ADENOIDECTOMY        General Information       Row Name 12/12/23 142          Physical Therapy Time and Intention     Document Type therapy note (daily note)  -PC     Mode of Treatment physical therapy  -PC       Row Name 12/12/23 1423          General Information    Patient Profile Reviewed yes  -PC     Existing Precautions/Restrictions fall;oxygen therapy device and L/min  -PC       Row Name 12/12/23 1423          Cognition    Orientation Status (Cognition) oriented x 4  -PC       Row Name 12/12/23 1423          Safety Issues, Functional Mobility    Impairments Affecting Function (Mobility) balance;endurance/activity tolerance;strength;shortness of breath  -PC               User Key  (r) = Recorded By, (t) = Taken By, (c) = Cosigned By      Initials Name Provider Type    PC Flower Pollock, PT Physical Therapist                   Mobility       Row Name 12/12/23 1424          Bed Mobility    Comment, (Bed Mobility) pt is up in chair  -PC       Row Name 12/12/23 1424          Sit-Stand Transfer    Sit-Stand [place] (Transfers) standby assist  -PC     Assistive Device (Sit-Stand Transfers) walker, front-wheeled  -PC     Comment, (Sit-Stand Transfer) sit to stand x 2  -PC       Row Name 12/12/23 1424          Gait/Stairs (Locomotion)    [place] Level (Gait) contact guard;1 person assist;1 person to manage equipment  -PC     Assistive Device (Gait) walker, front-wheeled  -PC     Distance in Feet (Gait) 6 ft X 2 with a seated rest, followed with chair  -PC               User Key  (r) = Recorded By, (t) = Taken By, (c) = Cosigned By      Initials Name Provider Type    PC Flower Pollock, PT Physical Therapist                   Obj/Interventions    No documentation.                  Goals/Plan    No documentation.                  Clinical Impression       Row Name 12/12/23 1425          Pain    Pretreatment Pain Rating 0/10 - no pain  -PC       Row Name 12/12/23 1425          Plan of Care Review    Plan of Care Reviewed With patient  -PC     Progress improving  -PC     Outcome Evaluation Pt able to take 2 short walks today  with CGA, plus assist for lines and to follow with chair, O2 sats >90% throughout, overall good improvement in toleration of activity  -PC       Row Name 12/12/23 1425          Positioning and Restraints    Pre-Treatment Position sitting in chair/recliner  -PC     Post Treatment Position chair  -PC     In Chair reclined;call light within reach;encouraged to call for assist  -PC               User Key  (r) = Recorded By, (t) = Taken By, (c) = Cosigned By      Initials Name Provider Type    PC Flower Pollock, PT Physical Therapist                   Outcome Measures       Row Name 12/12/23 1426 12/12/23 0859       How much help from another person do you currently need...    Turning from your back to your side while in flat bed without using bedrails? 3  -PC 3  -KE    Moving from lying on back to sitting on the side of a flat bed without bedrails? 3  -PC 3  -KE    Moving to and from a bed to a chair (including a wheelchair)? 3  -PC 3  -KE    Standing up from a chair using your arms (e.g., wheelchair, bedside chair)? 3  -PC 3  -KE    Climbing 3-5 steps with a railing? 1  -PC 1  -KE    To walk in hospital room? 2  -PC 1  -KE    AM-PAC 6 Clicks Score (PT) 15  -PC 14  -KE    Highest Level of Mobility Goal 4 --> Transfer to chair/commode  -PC 4 --> Transfer to chair/commode  -KE              User Key  (r) = Recorded By, (t) = Taken By, (c) = Cosigned By      Initials Name Provider Type    Flower Hillman, PT Physical Therapist    Oliva Lombardi, RN Registered Nurse                                 Physical Therapy Education       Title: PT OT SLP Therapies (In Progress)       Topic: Physical Therapy (Done)       Point: Mobility training (Done)       Learning Progress Summary             Patient Acceptance, E,D, DU by PC at 12/12/2023 1426    Acceptance, E,D, DU by PC at 12/11/2023 1534    Acceptance, E, VU by ER at 12/7/2023 1144                         Point: Home exercise program (Done)       Learning Progress  Summary             Patient Acceptance, E,D, DU by  at 12/11/2023 1534    Acceptance, E,TB,D, VU,NR by  at 12/8/2023 1722    Acceptance, E, VU by ER at 12/7/2023 1144                         Point: Body mechanics (Done)       Learning Progress Summary             Patient Acceptance, E,D, DU by  at 12/12/2023 1426    Acceptance, E, VU by ER at 12/7/2023 1144                         Point: Precautions (Done)       Learning Progress Summary             Patient Acceptance, E,D, DU by  at 12/12/2023 1426    Acceptance, E, VU by ER at 12/7/2023 1144                                         User Key       Initials Effective Dates Name Provider Type Discipline     06/16/21 -  Flower Pollock, PT Physical Therapist PT     04/08/22 -  Lizet Mckeon, PT Physical Therapist PT    ER 10/15/23 -  Cat Burris, PT Physical Therapist PT                  PT Recommendation and Plan     Plan of Care Reviewed With: patient  Progress: improving  Outcome Evaluation: Pt able to take 2 short walks today with CGA, plus assist for lines and to follow with chair, O2 sats >90% throughout, overall good improvement in toleration of activity     Time Calculation:         PT Charges       Row Name 12/12/23 1427             Time Calculation    Start Time 1358  -PC      Stop Time 1417  -PC      Time Calculation (min) 19 min  -PC      PT Received On 12/12/23  -      PT - Next Appointment 12/13/23  -                User Key  (r) = Recorded By, (t) = Taken By, (c) = Cosigned By      Initials Name Provider Type     Flower Pollock, PT Physical Therapist                  Therapy Charges for Today       Code Description Service Date Service Provider Modifiers Qty    29206847480 HC PT THER PROC EA 15 MIN 12/11/2023 Flower Pollock, PT GP 1    19805372436 HC PT THER PROC EA 15 MIN 12/12/2023 Flower Pollock, PT GP 1            PT G-Codes  Outcome Measure Options: AM-PAC 6 Clicks Basic Mobility (PT)  AM-PAC 6 Clicks Score (PT): 15  AM-PAC 6  Clicks Score (OT): 14  PT Discharge Summary  Anticipated Discharge Disposition (PT): home with assist, home with home health    Flower Pollock, PT  12/12/2023

## 2023-12-12 NOTE — PROGRESS NOTES
[facility] Progress Note      Patient Name: Preston Wallis  : 1965  MRN: [id number]  Primary Care Physician:  Kimmy Riley MD  Date of admission: 2023    Subjective     Interval History:   Follow-up acute kidney injury on chronic kidney disease    The patient is feeling better, no chest pain or shortness of air, no orthopnea or PND, no nausea or vomiting, has Doyle catheter anchored in  Review of Systems:   As noted above    Objective     Vitals:   Temp:  [97.9 °F (36.6 °C)-98.2 °F (36.8 °C)] 98.2 °F (36.8 °C)  Heart Rate:  [73-93] 91  Resp:  [16-20] 20  BP: (136-186)/(76-83) 136/76  Flow (L/min):  [2] 2    Intake/Output Summary (Last 24 hours) at 2023 0917  Last data filed at 2023 0909  Gross per 24 hour   Intake --   Output 3100 ml   Net -3100 ml       Physical Exam:    General Appearance: alert, awake, chronically ill, obese, no acute distress  Skin: warm and dry  HEENT: oral mucosa normal, nonicteric sclera  Neck: supple, no JVD  Lungs: Soft wheezes, scattered rhonchi, not labored on 2 L/min  Heart: RR, tachycardic, normal S1 and S2, no S3, no rub  Abdomen: soft, nontender, distended, BS +  : Doyle catheter anchored in place  Extremities: Trace pedal and pretibial edema.  Neuro: normal speech and mental status     Scheduled Meds:     atorvastatin, 20 mg, Oral, Nightly  budesonide-formoterol, 2 puff, Inhalation, BID - RT  docusate sodium, 100 mg, Oral, Daily  famotidine, 40 mg, Oral, QAM  finasteride, 5 mg, Oral, Daily  gabapentin, 300 mg, Oral, Q8H  heparin (porcine), 5,000 Units, Subcutaneous, Q8H  hydrocortisone-bacitracin-zinc oxide-nystatin, 1 application , Topical, Q12H  insulin glargine, 50 Units, Subcutaneous, Nightly  insulin lispro, 3-14 Units, Subcutaneous, 4x Daily AC & at Bedtime  ipratropium-albuterol, 3 mL, Nebulization, Q4H - RT  metoprolol tartrate, 100 mg, Oral, BID  montelukast, 10 mg, Oral, Daily  mupirocin, 1 application ,  Topical, Q12H  piperacillin-tazobactam, 4.5 g, Intravenous, Q8H  senna-docusate sodium, 2 tablet, Oral, BID  sodium chloride, 4 mL, Nebulization, BID - RT      IV Meds:        Results Reviewed:   I have personally reviewed the results from the time of this admission to 12/12/2023 09:17 EST     Results from last 7 days   Lab Units 12/11/23  0849 12/10/23  0608 12/09/23  0624 12/08/23  0657   SODIUM mmol/L 138 136 137 141   POTASSIUM mmol/L 4.9 5.0 4.2 4.2   CHLORIDE mmol/L 101 99 98 102   CO2 mmol/L 25.5 26.0 26.7 27.0   BUN mg/dL 31* 29* 26* 35*   CREATININE mg/dL 2.10* 2.27* 2.36* 2.71*   CALCIUM mg/dL 9.2 9.0 9.0 9.0   BILIRUBIN mg/dL  --  <0.2 0.2 0.2   ALK PHOS U/L  --  138* 132* 130*   ALT (SGPT) U/L  --  24 27 27   AST (SGOT) U/L  --  20 21 23   GLUCOSE mg/dL 396* 270* 252* 167*       Estimated Creatinine Clearance: 50.3 mL/min (A) (by C-G formula based on SCr of 2.1 mg/dL (H)).    Results from last 7 days   Lab Units 12/12/23  0712 12/11/23  0849 12/11/23  0553 12/10/23  0608 12/09/23  0624 12/08/23  0657   MAGNESIUM mg/dL 2.0  --  2.0 2.0 2.1 2.4   PHOSPHORUS mg/dL  --  3.2  --   --  3.1 3.9       Results from last 7 days   Lab Units 12/12/23  0712 12/09/23  0624 12/08/23  0657   URIC ACID mg/dL 4.1 4.7 5.7       Results from last 7 days   Lab Units 12/12/23  0712 12/11/23  0553 12/10/23  0608 12/09/23  0624 12/08/23  0657   WBC 10*3/mm3 13.83* 14.03* 15.63* 17.71* 20.64*   HEMOGLOBIN g/dL 8.1* 7.6* 8.3* 8.1* 8.3*   PLATELETS 10*3/mm3 474* 453* 460* 463* 466*       Results from last 7 days   Lab Units 12/06/23  2132   INR  1.34*       Assessment / Plan     ASSESSMENT:  ERIC on CKD3b, nonoliguric, improving slowly:  multifactorial; improving volume excess; stable electrolytes, slowly improving creatinine yesterday was down to 2.1 and the electrolyte within acceptable range, today's chemistry still pending, urine output remains robust at 2725 cc in the past 24  CKD 3b, secondary to diabetic nephropathy;  baseline creatinine 1.6-1.8; subnephrotic proteinuria, with urine protein to creatinine ratio 2.5 g/g on 12/7/2023.  Bronchiectasis with pneumonia, being evaluated by ID  Diabetes mellitus type 2 with renal complication; + neuropathy; also has gastroparesis  Neurogenic bladder, requiring self-catheterization; currently has Doyle catheter anchored   Anemia, hemoglobin today 8.1 relatively speaking stable  Morbid obesity  Hypertension with chronic kidney disease, reasonably controlled  Chronic lung disease on continuous O2    PLAN:  Continue holding diuretic as he is auto-diuresing  Surveillance labs    I reviewed the chart and other providers notes, reviewed labs.  I discussed the case with the patient  Copied text in this note has been reviewed and is accurate as of 12/12/23.       Thank you for involving us in the care of Preston Wallis.  Please feel free to call with any questions.    Buddy Ignacio MD  12/12/23  09:17 [facility]    [facility]  [phone]    Please note that portions of this note were completed with a voice recognition program.

## 2023-12-12 NOTE — PLAN OF CARE
Goal Outcome Evaluation:  Plan of Care Reviewed With: patient        Progress: improving  Outcome Evaluation: Pt able to take 2 short walks today with CGA, plus assist for lines and to follow with chair, O2 sats >90% throughout, overall good improvement in toleration of activity      Anticipated Discharge Disposition (PT): home with assist, home with home health

## 2023-12-12 NOTE — PROGRESS NOTES
Name: Preston Wallis ADMIT: 2023   : 1965  PCP: Kimmy Riley MD    MRN: [id number] LOS: 6 days   AGE/SEX: 58 y.o. male  ROOM: [facility]     Subjective   Subjective   Feeling fine again today. Breathing is better--feels it's back to baseline for him. No cough. No SOA at rest. No N/V/D/abd pain. Tolerating diet and eating well. Making urine. No F/C/NS. No CP or palp. No HA or vis changes.       Objective   Objective   Vital Signs  Temp:  [97.9 °F (36.6 °C)-98.2 °F (36.8 °C)] 98.2 °F (36.8 °C)  Heart Rate:  [73-93] 78  Resp:  [16-20] 20  BP: (136-186)/(76-83) 136/76  SpO2:  [96 %-100 %] 100 %  on  Flow (L/min):  [2] 2;   Device (Oxygen Therapy): nasal cannula  Body mass index is 41.02 kg/m².    (No change in exam today)    Physical Exam  Vitals and nursing note reviewed.   Constitutional:       General: He is not in acute distress.     Appearance: He is obese. He is ill-appearing (chronically). He is not toxic-appearing or diaphoretic.   HENT:      Head: Normocephalic.      Nose: Nose normal.      Mouth/Throat:      Mouth: Mucous membranes are moist.      Pharynx: Oropharynx is clear.   Eyes:      General: No scleral icterus.        Right eye: No discharge.         Left eye: No discharge.      Conjunctiva/sclera: Conjunctivae normal.   Cardiovascular:      Rate and Rhythm: Normal rate and regular rhythm.      Pulses: Normal pulses.   Pulmonary:      Effort: Pulmonary effort is normal. No respiratory distress.      Breath sounds: Normal breath sounds. No wheezing or rales.      Comments: Anteriorly   Abdominal:      General: Bowel sounds are normal. There is no distension.      Palpations: Abdomen is soft.      Tenderness: There is no abdominal tenderness.   Genitourinary:     Comments: Clear yellow urine in lopez bag  Musculoskeletal:         General: Swelling present. Normal range of motion.      Cervical back: Neck supple.   Skin:     General: Skin is warm and dry.      Capillary Refill: Capillary  refill takes less than 2 seconds.      Coloration: Skin is pale. Skin is not jaundiced.   Neurological:      General: No focal deficit present.      Mental Status: He is alert and oriented to person, place, and time. Mental status is at baseline.      Cranial Nerves: No cranial nerve deficit.      Coordination: Coordination normal.   Psychiatric:         Mood and Affect: Mood normal.         Behavior: Behavior normal.         Thought Content: Thought content normal.       Results Review     I reviewed the patient's new clinical results.  Results from last 7 days   Lab Units 12/12/23  0712 12/11/23  0553 12/10/23  0608 12/09/23  0624   WBC 10*3/mm3 13.83* 14.03* 15.63* 17.71*   HEMOGLOBIN g/dL 8.1* 7.6* 8.3* 8.1*   PLATELETS 10*3/mm3 474* 453* 460* 463*     Results from last 7 days   Lab Units 12/12/23  0859 12/11/23  0849 12/10/23  0608 12/09/23  0624   SODIUM mmol/L 137 138 136 137   POTASSIUM mmol/L 4.7 4.9 5.0 4.2   CHLORIDE mmol/L 101 101 99 98   CO2 mmol/L 27.0 25.5 26.0 26.7   BUN mg/dL 27* 31* 29* 26*   CREATININE mg/dL 1.86* 2.10* 2.27* 2.36*   GLUCOSE mg/dL 317* 396* 270* 252*   EGFR mL/min/1.73 41.4* 35.8* 32.6* 31.1*     Results from last 7 days   Lab Units 12/12/23  0859 12/11/23  0849 12/10/23  0608 12/09/23  0624 12/08/23  0657 12/06/23  2132   ALBUMIN g/dL 3.3* 3.0* 3.1* 3.1* 3.1* 2.8*   BILIRUBIN mg/dL  --   --  <0.2 0.2 0.2 0.3   ALK PHOS U/L  --   --  138* 132* 130* 124*   AST (SGOT) U/L  --   --  20 21 23 17   ALT (SGPT) U/L  --   --  24 27 27 22     Results from last 7 days   Lab Units 12/12/23  0859 12/12/23  0712 12/11/23  0849 12/11/23  0553 12/10/23  0608 12/09/23  0624 12/08/23  0657   CALCIUM mg/dL 9.4  --  9.2  --  9.0 9.0 9.0   ALBUMIN g/dL 3.3*  --  3.0*  --  3.1* 3.1* 3.1*   MAGNESIUM mg/dL  --  2.0  --  2.0 2.0 2.1 2.4   PHOSPHORUS mg/dL 3.6  --  3.2  --   --  3.1 3.9     Results from last 7 days   Lab Units 12/06/23  2132   PROCALCITONIN ng/mL 1.33*   LACTATE mmol/L 1.3     Glucose    Date/Time Value Ref Range Status   12/12/2023 1204 327 (H) 70 - 130 mg/dL Final   12/12/2023 0627 320 (H) 70 - 130 mg/dL Final   12/11/2023 2305 273 (H) 70 - 130 mg/dL Final   12/11/2023 1657 247 (H) 70 - 130 mg/dL Final   12/11/2023 1121 375 (H) 70 - 130 mg/dL Final   12/11/2023 0856 412 (C) 70 - 130 mg/dL Final   12/11/2023 0751 474 (C) 70 - 130 mg/dL Final       No radiology results for the last day    I have personally reviewed all medications:  Scheduled Medications  atorvastatin, 20 mg, Oral, Nightly  budesonide-formoterol, 2 puff, Inhalation, BID - RT  docusate sodium, 100 mg, Oral, Daily  famotidine, 40 mg, Oral, QAM  finasteride, 5 mg, Oral, Daily  gabapentin, 300 mg, Oral, Q8H  heparin (porcine), 5,000 Units, Subcutaneous, Q8H  hydrocortisone-bacitracin-zinc oxide-nystatin, 1 application , Topical, Q12H  insulin glargine, 50 Units, Subcutaneous, Nightly  insulin lispro, 4-24 Units, Subcutaneous, 4x Daily AC & at Bedtime  ipratropium-albuterol, 3 mL, Nebulization, Q4H - RT  metoprolol tartrate, 100 mg, Oral, BID  montelukast, 10 mg, Oral, Daily  mupirocin, 1 application , Topical, Q12H  piperacillin-tazobactam, 4.5 g, Intravenous, Q8H  senna-docusate sodium, 2 tablet, Oral, BID  sodium chloride, 4 mL, Nebulization, BID - RT    Infusions   Diet  Diet: Cardiac Diets, Diabetic Diets; Healthy Heart (2-3 Na+); Consistent Carbohydrate; Texture: Regular Texture (IDDSI 7); Fluid Consistency: Thin (IDDSI 0)    I have personally reviewed:  [x]  Laboratory   []  Microbiology   []  Radiology   [x]  EKG/Telemetry  []  Cardiology/Vascular   []  Pathology    []  Records       Assessment/Plan     Active Hospital Problems    Diagnosis  POA    **Bacterial pneumonia [J15.9]  Yes    Bronchiectasis [J47.9]  Yes    Anemia [D64.9]  Yes    Sepsis [A41.9]  Yes    Essential hypertension [I10]  Yes    Chronic obstructive pulmonary disease [J44.9]  Yes    Stage 3b chronic kidney disease [N18.32]  Yes    Neurogenic bladder [N31.9]   Yes    Hyperlipidemia [E78.5]  Yes    Paroxysmal atrial fibrillation [I48.0]  Yes    Obstructive sleep apnea [G47.33]  Yes    Chronic diastolic heart failure [I50.32]  Yes    EIRC (acute kidney injury) [N17.9]  Yes      Resolved Hospital Problems   No resolved problems to display.       59yo gentleman, former smoker, with COPD, chronic bronchiectasis, CHRF, MILADY, chronic diastolic CHF, CKD3b, DM2, HTN, HLD, PAF (Eliquis), neurogenic bladder, morbid obesity, and prior h/o both MRSA PNA and Pseudomonas PNA, who was admitted to [facility] on 11/28 for RLL PNA. He was initially treated with Vanc/Cefepime and MRSA screen was positive. Sputum culture grew Achromobacter xylosoxidans on 12/3 and he was changed to Zosyn/Bactrim. Despite this he had worsening of O2 requirements and persistent leukocytosis. Renal function worsened so Bactrim was changed to Zyvox and arrangements were made to transfer him to [facility] so that he could evaluated by Infectious Disease, Pulm, and Renal services.     RLL PNA (MRSA and Achromobacter)  Acute on chronic hypoxic resp failure  Pulm and ID following  Afebrile and VSS, stable on 2L/min (baseline)  WBC falling  ID has changed Zyvox to Zosyn  Recommending abx through 12/14  CT chest c/w infections--both old and acute, no mass seen  Will need outpt f/u with Pulm for re-imaging  Continue pulm hygiene efforts with hypertonic saline nebs     COPD  Chronic bronchiectasis  Appreciate Pulm attention to pt  Supplemental O2 stable at 2L/min (baseline)  Treating PNA  Scheduled DuoNebs and Symbicort, Singulair     ERIC/CKD3b  Appreciate Renal attention to pt  Cr improving steadily   Holding diuretic, auto-diuresing     Anemia NOS  Hgb improved after a unit of PRBCs 12/7   No evidence of bleeding   Continue to monitor Hgb and transfuse if drops below 7.0  Labs c/w chronic disease and mild iron def     Abd wall cellulitis/abscess  Resolved after I&D by Surg at [facility]  Topical Mupirocin  Had some  bleeding after at [facility] so Eliquis on hold  On SQ Heparin for DVT ppx, watch for recurrence of bleeding--none so far     PAF (Eliquis)  NSR on EKG here  HRs acceptable on metoprolol and BPs tolerating  Eliquis on hold due to bleeding from abd wall I&D site (now resolved)--consider restarting AC      Chronic diastolic CHF  Holding Bumex due to ERIC  Defer volume status to Renal for now  Auto-diuresing     DM2  A1c 8.4  Continue Lantus/SSI   Increased Lantus to 50 units  Sugars still high  Increase SSI dose today  Likely increase Lantus again tomorrow  Holding Bydureon and Farxiga     HTN  BPs high  Continue metoprolol  His home nifedipine has been on hold--will restart     Neurogenic bladder  Chronic urinary retention  Pt self-caths 4 times/day normally  Doyle cath in place for now     Morbid obesity  Complicating all aspects of care     Bedbug infestation    Heparin SC for DVT prophylaxis.  Full code.  Discussed with patient. D/w RN, CCP, and Pharm at morning huddle.  Anticipate discharge TBD . Maybe ready to go home Thursday after completes course of Zosyn.      Iam Eller MD  [facility]  12/12/23  12:38 EST

## 2023-12-13 LAB
ALBUMIN SERPL-MCNC: 3.1 G/DL (ref 3.5–5.2)
ANION GAP SERPL CALCULATED.3IONS-SCNC: 11 MMOL/L (ref 5–15)
BASOPHILS # BLD AUTO: 0.06 10*3/MM3 (ref 0–0.2)
BASOPHILS NFR BLD AUTO: 0.4 % (ref 0–1.5)
BUN SERPL-MCNC: 24 MG/DL (ref 6–20)
BUN/CREAT SERPL: 13 (ref 7–25)
CALCIUM SPEC-SCNC: 9.1 MG/DL (ref 8.6–10.5)
CHLORIDE SERPL-SCNC: 99 MMOL/L (ref 98–107)
CO2 SERPL-SCNC: 27 MMOL/L (ref 22–29)
CREAT SERPL-MCNC: 1.85 MG/DL (ref 0.76–1.27)
DEPRECATED RDW RBC AUTO: 41.9 FL (ref 37–54)
EGFRCR SERPLBLD CKD-EPI 2021: 41.7 ML/MIN/1.73
EOSINOPHIL # BLD AUTO: 0.55 10*3/MM3 (ref 0–0.4)
EOSINOPHIL NFR BLD AUTO: 3.9 % (ref 0.3–6.2)
ERYTHROCYTE [DISTWIDTH] IN BLOOD BY AUTOMATED COUNT: 12.8 % (ref 12.3–15.4)
GLUCOSE BLDC GLUCOMTR-MCNC: 223 MG/DL (ref 70–130)
GLUCOSE BLDC GLUCOMTR-MCNC: 245 MG/DL (ref 70–130)
GLUCOSE BLDC GLUCOMTR-MCNC: 268 MG/DL (ref 70–130)
GLUCOSE BLDC GLUCOMTR-MCNC: 331 MG/DL (ref 70–130)
GLUCOSE SERPL-MCNC: 328 MG/DL (ref 65–99)
HBA1C MFR BLD: 8.2 % (ref 4.8–5.6)
HCT VFR BLD AUTO: 25.7 % (ref 37.5–51)
HGB BLD-MCNC: 8 G/DL (ref 13–17.7)
IMM GRANULOCYTES # BLD AUTO: 0.15 10*3/MM3 (ref 0–0.05)
IMM GRANULOCYTES NFR BLD AUTO: 1.1 % (ref 0–0.5)
LYMPHOCYTES # BLD AUTO: 2.9 10*3/MM3 (ref 0.7–3.1)
LYMPHOCYTES NFR BLD AUTO: 20.4 % (ref 19.6–45.3)
MAGNESIUM SERPL-MCNC: 1.8 MG/DL (ref 1.6–2.6)
MCH RBC QN AUTO: 28 PG (ref 26.6–33)
MCHC RBC AUTO-ENTMCNC: 31.1 G/DL (ref 31.5–35.7)
MCV RBC AUTO: 89.9 FL (ref 79–97)
MONOCYTES # BLD AUTO: 0.95 10*3/MM3 (ref 0.1–0.9)
MONOCYTES NFR BLD AUTO: 6.7 % (ref 5–12)
NEUTROPHILS NFR BLD AUTO: 67.5 % (ref 42.7–76)
NEUTROPHILS NFR BLD AUTO: 9.63 10*3/MM3 (ref 1.7–7)
NRBC BLD AUTO-RTO: 0 /100 WBC (ref 0–0.2)
PHOSPHATE SERPL-MCNC: 3.5 MG/DL (ref 2.5–4.5)
PLATELET # BLD AUTO: 415 10*3/MM3 (ref 140–450)
PMV BLD AUTO: 8.5 FL (ref 6–12)
POTASSIUM SERPL-SCNC: 4.7 MMOL/L (ref 3.5–5.2)
RBC # BLD AUTO: 2.86 10*6/MM3 (ref 4.14–5.8)
SODIUM SERPL-SCNC: 137 MMOL/L (ref 136–145)
URATE SERPL-MCNC: 4.2 MG/DL (ref 3.4–7)
WBC NRBC COR # BLD AUTO: 14.24 10*3/MM3 (ref 3.4–10.8)

## 2023-12-13 PROCEDURE — 25010000002 HEPARIN (PORCINE) PER 1000 UNITS: Performed by: HOSPITALIST

## 2023-12-13 PROCEDURE — 83735 ASSAY OF MAGNESIUM: CPT | Performed by: INTERNAL MEDICINE

## 2023-12-13 PROCEDURE — 94664 DEMO&/EVAL PT USE INHALER: CPT

## 2023-12-13 PROCEDURE — 63710000001 INSULIN LISPRO (HUMAN) PER 5 UNITS: Performed by: HOSPITALIST

## 2023-12-13 PROCEDURE — 85025 COMPLETE CBC W/AUTO DIFF WBC: CPT | Performed by: INTERNAL MEDICINE

## 2023-12-13 PROCEDURE — 63710000001 INSULIN GLARGINE PER 5 UNITS: Performed by: HOSPITALIST

## 2023-12-13 PROCEDURE — 94799 UNLISTED PULMONARY SVC/PX: CPT

## 2023-12-13 PROCEDURE — 94760 N-INVAS EAR/PLS OXIMETRY 1: CPT

## 2023-12-13 PROCEDURE — 82948 REAGENT STRIP/BLOOD GLUCOSE: CPT

## 2023-12-13 PROCEDURE — 83036 HEMOGLOBIN GLYCOSYLATED A1C: CPT | Performed by: INTERNAL MEDICINE

## 2023-12-13 PROCEDURE — 97535 SELF CARE MNGMENT TRAINING: CPT

## 2023-12-13 PROCEDURE — 84550 ASSAY OF BLOOD/URIC ACID: CPT | Performed by: INTERNAL MEDICINE

## 2023-12-13 PROCEDURE — 63710000001 INSULIN LISPRO (HUMAN) PER 5 UNITS: Performed by: INTERNAL MEDICINE

## 2023-12-13 PROCEDURE — 25010000002 PIPERACILLIN SOD-TAZOBACTAM PER 1 G: Performed by: STUDENT IN AN ORGANIZED HEALTH CARE EDUCATION/TRAINING PROGRAM

## 2023-12-13 PROCEDURE — 94761 N-INVAS EAR/PLS OXIMETRY MLT: CPT

## 2023-12-13 PROCEDURE — 97110 THERAPEUTIC EXERCISES: CPT

## 2023-12-13 PROCEDURE — 80069 RENAL FUNCTION PANEL: CPT | Performed by: INTERNAL MEDICINE

## 2023-12-13 RX ORDER — INSULIN LISPRO 100 [IU]/ML
2-9 INJECTION, SOLUTION INTRAVENOUS; SUBCUTANEOUS
Status: DISCONTINUED | OUTPATIENT
Start: 2023-12-13 | End: 2023-12-14 | Stop reason: HOSPADM

## 2023-12-13 RX ORDER — INSULIN LISPRO 100 [IU]/ML
16 INJECTION, SOLUTION INTRAVENOUS; SUBCUTANEOUS
Status: DISCONTINUED | OUTPATIENT
Start: 2023-12-13 | End: 2023-12-14 | Stop reason: HOSPADM

## 2023-12-13 RX ADMIN — INSULIN LISPRO 16 UNITS: 100 INJECTION, SOLUTION INTRAVENOUS; SUBCUTANEOUS at 17:06

## 2023-12-13 RX ADMIN — Medication 4 ML: at 23:34

## 2023-12-13 RX ADMIN — PIPERACILLIN SODIUM AND TAZOBACTAM SODIUM 4.5 G: 4; .5 INJECTION, SOLUTION INTRAVENOUS at 08:34

## 2023-12-13 RX ADMIN — BUDESONIDE AND FORMOTEROL FUMARATE DIHYDRATE 2 PUFF: 160; 4.5 AEROSOL RESPIRATORY (INHALATION) at 06:45

## 2023-12-13 RX ADMIN — IPRATROPIUM BROMIDE AND ALBUTEROL SULFATE 3 ML: 2.5; .5 SOLUTION RESPIRATORY (INHALATION) at 10:48

## 2023-12-13 RX ADMIN — GABAPENTIN 300 MG: 300 CAPSULE ORAL at 14:54

## 2023-12-13 RX ADMIN — INSULIN LISPRO 4 UNITS: 100 INJECTION, SOLUTION INTRAVENOUS; SUBCUTANEOUS at 17:06

## 2023-12-13 RX ADMIN — IPRATROPIUM BROMIDE AND ALBUTEROL SULFATE 3 ML: 2.5; .5 SOLUTION RESPIRATORY (INHALATION) at 06:36

## 2023-12-13 RX ADMIN — HEPARIN SODIUM 5000 UNITS: 5000 INJECTION INTRAVENOUS; SUBCUTANEOUS at 14:54

## 2023-12-13 RX ADMIN — MONTELUKAST SODIUM 10 MG: 10 TABLET, FILM COATED ORAL at 08:34

## 2023-12-13 RX ADMIN — INSULIN LISPRO 16 UNITS: 100 INJECTION, SOLUTION INTRAVENOUS; SUBCUTANEOUS at 12:03

## 2023-12-13 RX ADMIN — IPRATROPIUM BROMIDE AND ALBUTEROL SULFATE 3 ML: 2.5; .5 SOLUTION RESPIRATORY (INHALATION) at 03:39

## 2023-12-13 RX ADMIN — INSULIN GLARGINE 50 UNITS: 100 INJECTION, SOLUTION SUBCUTANEOUS at 20:33

## 2023-12-13 RX ADMIN — GABAPENTIN 300 MG: 300 CAPSULE ORAL at 21:45

## 2023-12-13 RX ADMIN — HEPARIN SODIUM 5000 UNITS: 5000 INJECTION INTRAVENOUS; SUBCUTANEOUS at 05:40

## 2023-12-13 RX ADMIN — FAMOTIDINE 40 MG: 20 TABLET ORAL at 05:39

## 2023-12-13 RX ADMIN — HEPARIN SODIUM 5000 UNITS: 5000 INJECTION INTRAVENOUS; SUBCUTANEOUS at 21:45

## 2023-12-13 RX ADMIN — ZINC OXIDE 1 APPLICATION: 200 OINTMENT TOPICAL at 08:35

## 2023-12-13 RX ADMIN — METOPROLOL TARTRATE 100 MG: 50 TABLET, FILM COATED ORAL at 20:33

## 2023-12-13 RX ADMIN — MUPIROCIN 1 APPLICATION: 20 OINTMENT TOPICAL at 08:35

## 2023-12-13 RX ADMIN — MUPIROCIN 1 APPLICATION: 20 OINTMENT TOPICAL at 21:45

## 2023-12-13 RX ADMIN — NIFEDIPINE 30 MG: 30 TABLET, FILM COATED, EXTENDED RELEASE ORAL at 08:34

## 2023-12-13 RX ADMIN — IPRATROPIUM BROMIDE AND ALBUTEROL SULFATE 3 ML: 2.5; .5 SOLUTION RESPIRATORY (INHALATION) at 19:54

## 2023-12-13 RX ADMIN — IPRATROPIUM BROMIDE AND ALBUTEROL SULFATE 3 ML: 2.5; .5 SOLUTION RESPIRATORY (INHALATION) at 14:29

## 2023-12-13 RX ADMIN — GABAPENTIN 300 MG: 300 CAPSULE ORAL at 05:39

## 2023-12-13 RX ADMIN — INSULIN LISPRO 16 UNITS: 100 INJECTION, SOLUTION INTRAVENOUS; SUBCUTANEOUS at 08:34

## 2023-12-13 RX ADMIN — INSULIN LISPRO 6 UNITS: 100 INJECTION, SOLUTION INTRAVENOUS; SUBCUTANEOUS at 20:33

## 2023-12-13 RX ADMIN — BUDESONIDE AND FORMOTEROL FUMARATE DIHYDRATE 2 PUFF: 160; 4.5 AEROSOL RESPIRATORY (INHALATION) at 23:35

## 2023-12-13 RX ADMIN — ZINC OXIDE 1 APPLICATION: 200 OINTMENT TOPICAL at 21:45

## 2023-12-13 RX ADMIN — FINASTERIDE 5 MG: 5 TABLET, FILM COATED ORAL at 08:34

## 2023-12-13 RX ADMIN — METOPROLOL TARTRATE 100 MG: 50 TABLET, FILM COATED ORAL at 08:34

## 2023-12-13 RX ADMIN — Medication 4 ML: at 06:36

## 2023-12-13 RX ADMIN — ATORVASTATIN CALCIUM 20 MG: 20 TABLET, FILM COATED ORAL at 20:33

## 2023-12-13 RX ADMIN — PIPERACILLIN SODIUM AND TAZOBACTAM SODIUM 4.5 G: 4; .5 INJECTION, SOLUTION INTRAVENOUS at 14:54

## 2023-12-13 RX ADMIN — IPRATROPIUM BROMIDE AND ALBUTEROL SULFATE 3 ML: 2.5; .5 SOLUTION RESPIRATORY (INHALATION) at 23:34

## 2023-12-13 NOTE — THERAPY TREATMENT NOTE
Patient Name: Preston Wallis  : 1965    MRN: [id number]                              Today's Date: 2023       Admit Date: 2023    Visit Dx: No diagnosis found.  Patient Active Problem List   Diagnosis    Polyneuropathy    Paroxysmal atrial fibrillation    Obstructive sleep apnea    MRSA pneumonia    Low back pain    Chronic diastolic heart failure    Allergies    COPD exacerbation    Chronic anticoagulation    Benign prostatic hyperplasia    Impaired mobility and endurance    Stage 3a chronic kidney disease    Iron deficiency anemia secondary to inadequate dietary iron intake    Vitamin D deficiency    Class 3 severe obesity with serious comorbidity in adult    Lower extremity edema    Elevated alkaline phosphatase level    Venous insufficiency (chronic) (peripheral)    Tobacco abuse, in remission    History of Pseudomonas pneumonia    Chronic dyspnea    Gastroesophageal reflux disease    Bronchiectasis without complication    ERIC (acute kidney injury)    Altered mental status    Hyperlipidemia    Luetscher's syndrome    Neurogenic bladder    Class 1 obesity    Pneumonia due to Pseudomonas species    Seizures    Primary osteoarthritis of left knee    Other constipation    Chronic obstructive pulmonary disease    Type 2 DM with CKD stage 3 and hypertension    Essential hypertension    Stage 3b chronic kidney disease    Annual physical exam    Long-term use of high-risk medication    Personal history of PE (pulmonary embolism)    Encounter for aftercare for healing closed traumatic fracture of left femur    Chronic pain of left knee    Primary osteoarthritis of right knee    Sepsis    Bronchiectasis    Bacterial pneumonia    Anemia     Past Medical History:   Diagnosis Date    Age-related cognitive decline     Allergic contact dermatitis     Allergies     Anemia     Bronchiectasis with acute lower respiratory infection     Charcot foot due to diabetes mellitus 9/10/2013    Chronic diastolic  (congestive) heart failure     Chronic kidney disease     Chronic respiratory failure with hypoxia     Closed supracondylar fracture of femur 1/12/2022    COPD (chronic obstructive pulmonary disease)     Deep vein thrombosis (DVT) of lower extremity associated with air travel 1/13/2023    Dependence on supplemental oxygen     Eczema     Erectile dysfunction     due to organic reasons    Essential (primary) hypertension     Fracture     closed fracture of other tarsal and metatarsal bones    Fracture of proximal humerus 1/13/2023    GERD without esophagitis     High risk medication use     Hypercholesteremia     Hypomagnesemia     Infected stasis ulcer of left lower extremity 1/13/2023    Insomnia     Low back pain     Major depressive disorder     Morbid (severe) obesity due to excess calories     MRSA pneumonia     Muscle weakness     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 without evidence of necrosis     Obstructive sleep apnea (adult) (pediatric)     Other forms of dyspnea     Other long term (current) drug therapy     Other specified noninfective gastroenteritis and colitis     Other spondylosis, lumbar region     Pain in both knees     Paroxysmal atrial fibrillation     Peripheral neuropathy     attributed to type 2 diabetes    Pneumonia, unspecified organism     Polyneuropathy     Rash and other nonspecific skin eruption     Syncope and collapse     Tachycardia     Tinnitus 1/13/2023    Type 1 diabetes mellitus with diabetic chronic kidney disease     Type 2 diabetes mellitus     Unspecified fall, initial encounter     Urinary retention      Past Surgical History:   Procedure Laterality Date    CHOLECYSTECTOMY      CYSTOSCOPY      FEMUR SURGERY Left     Shravan placed    KNEE SURGERY Left     OTHER SURGICAL HISTORY Left     venous port    TONSILLECTOMY AND ADENOIDECTOMY        General Information       Row Name 12/13/23 1518 12/13/23 1402       OT Time and Intention     Document Type therapy note (daily note)  - therapy note (daily note)  -    Mode of Treatment occupational therapy;individual therapy  - occupational therapy;individual therapy  -[facility] Name 12/13/23 1518 12/13/23 1402       General Information    Patient Profile Reviewed yes  -SM yes  -    Existing Precautions/Restrictions fall;oxygen therapy device and L/min  - fall;oxygen therapy device and L/min  -SM      [facility] Name 12/13/23 1518 12/13/23 1402       Cognition    Orientation Status (Cognition) oriented x 4  -SM oriented x 4  -SM      [facility] Name 12/13/23 1518 12/13/23 1402       Safety Issues, Functional Mobility    Impairments Affecting Function (Mobility) balance;endurance/activity tolerance;strength;shortness of breath;range of motion (ROM)  - balance;endurance/activity tolerance;strength;shortness of breath;range of motion (ROM)  -              User Key  (r) = Recorded By, (t) = Taken By, (c) = Cosigned By      Initials Name Provider Type     Grecia Shelby OT Occupational Therapist                     Mobility/ADL's       [facility] Name 12/13/23 1518          Sit-Stand Transfer    Sit-Stand Ethel (Transfers) contact guard  -     Assistive Device (Sit-Stand Transfers) walker, front-wheeled  -[facility] Name 12/13/23 1518          Toilet Transfer    Ethel Level (Toilet Transfer) --  -     Assistive Device (Toilet Transfer) --  -SM       Row Name 12/13/23 1518          Functional Mobility    Functional Mobility- Ind. Level --  -     Functional Mobility- Device --  -     Functional Mobility-Distance (Feet) --  5+5  -SM       Row Name 12/13/23 1518          Toileting Assessment/Training    Ethel Level (Toileting) --  -SM       Row Name 12/13/23 1518          Bathing Assessment/Intervention    Ethel Level (Bathing) upper extremities;moderate assist (50% patient effort)  -     Position (Bathing) supported sitting;supported standing  -[facility] Name 12/13/23  1518          Upper Body Dressing Assessment/Training    Wayne Level (Upper Body Dressing) don;front opening garment;standby assist  -     Position (Upper Body Dressing) supported sitting  -       Row Name 12/13/23 1518          Grooming Assessment/Training    Comment, (Grooming) min A to apply deoderant to R underarm and finish combing hair  -               User Key  (r) = Recorded By, (t) = Taken By, (c) = Cosigned By      Initials Name Provider Type    Grecia Jacobs OT Occupational Therapist                   Obj/Interventions       Row Name 12/13/23 1521          Balance    Static Standing Balance contact guard  -     Position/Device Used, Standing Balance supported;walker, rolling  -               User Key  (r) = Recorded By, (t) = Taken By, (c) = Cosigned By      Initials Name Provider Type    Grecia Jacobs OT Occupational Therapist                   Goals/Plan    No documentation.                  Clinical Impression       Row Name 12/13/23 1522          Pain Assessment    Pretreatment Pain Rating 0/10 - no pain  -SM     Posttreatment Pain Rating 0/10 - no pain  -       Row Name 12/13/23 1522          Plan of Care Review    Plan of Care Reviewed With patient  -     Outcome Evaluation Pt participated in OT today to complete ADL upright sitting in the chair. Pt was able to complete bathing but needed assist to wash RUE duue to poor L shoulder ROM. Pt also needed assist to wash figueroa area standing. Typically he can reach his figueroa area for bathing/toileting but today he is fearful his legs will give out on him if he lets go of the rwx. Pt stood CGA and static stood at the rwx but declined further mobility. He did report he was able to walk in PT eariler today. He has all needed DME to pivot transfer at home.  -       Row Name 12/13/23 1522          Therapy Plan Review/Discharge Plan (OT)    Anticipated Discharge Disposition (OT) home with home health;home with 24/7 care  -        Row Name 12/13/23 1522          Vital Signs    O2 Delivery Pre Treatment supplemental O2  -SM     O2 Delivery Intra Treatment supplemental O2  -SM     O2 Delivery Post Treatment supplemental O2  -SM       Row Name 12/13/23 1522          Positioning and Restraints    Pre-Treatment Position sitting in chair/recliner  -SM     Post Treatment Position chair  -SM     In Chair reclined;call light within reach;encouraged to call for assist;notified nsg  -               User Key  (r) = Recorded By, (t) = Taken By, (c) = Cosigned By      Initials Name Provider Type    Grecia Jacobs, OT Occupational Therapist                   Outcome Measures       Row Name 12/13/23 1524          How much help from another is currently needed...    Putting on and taking off regular lower body clothing? 2  -SM     Bathing (including washing, rinsing, and drying) 2  -SM     Toileting (which includes using toilet bed pan or urinal) 2  -SM     Putting on and taking off regular upper body clothing 3  -SM     Taking care of personal grooming (such as brushing teeth) 3  -SM     Eating meals 4  -SM     AM-PAC 6 Clicks Score (OT) 16  -SM       Row Name 12/13/23 1245 12/13/23 0846       How much help from another person do you currently need...    Turning from your back to your side while in flat bed without using bedrails? 3  -PC 3  -KE    Moving from lying on back to sitting on the side of a flat bed without bedrails? 3  -PC 3  -KE    Moving to and from a bed to a chair (including a wheelchair)? 3  -PC 3  -KE    Standing up from a chair using your arms (e.g., wheelchair, bedside chair)? 3  -PC 3  -KE    Climbing 3-5 steps with a railing? 1  -PC 1  -KE    To walk in hospital room? 3  -PC 2  -KE    AM-PAC 6 Clicks Score (PT) 16  -PC 15  -KE    Highest Level of Mobility Goal 5 --> Static standing  -PC 4 --> Transfer to chair/commode  -KE      Row Name 12/13/23 1524          Functional Assessment    Outcome Measure Options AM-PAC 6 Clicks  Daily Activity (OT)  -               User Key  (r) = Recorded By, (t) = Taken By, (c) = Cosigned By      Initials Name Provider Type    PC Flower Pollock, PT Physical Therapist    Grecia Jacobs, OT Occupational Therapist    Oliva Lombardi, RN Registered Nurse                    Occupational Therapy Education       Title: PT OT SLP Therapies (In Progress)       Topic: Occupational Therapy (In Progress)       Point: ADL training (Done)       Description:   Instruct learner(s) on proper safety adaptation and remediation techniques during self care or transfers.   Instruct in proper use of assistive devices.                  Learning Progress Summary             Patient Acceptance, E, VU by  at 12/7/2023 1210    Comment: OT goals, dc planning, DME for ADLs                         Point: Home exercise program (Not Started)       Description:   Instruct learner(s) on appropriate technique for monitoring, assisting and/or progressing therapeutic exercises/activities.                  Learner Progress:  Not documented in this visit.              Point: Precautions (Not Started)       Description:   Instruct learner(s) on prescribed precautions during self-care and functional transfers.                  Learner Progress:  Not documented in this visit.              Point: Body mechanics (Not Started)       Description:   Instruct learner(s) on proper positioning and spine alignment during self-care, functional mobility activities and/or exercises.                  Learner Progress:  Not documented in this visit.                              User Key       Initials Effective Dates Name Provider Type Discipline     04/02/20 -  Grecia Shelby, OT Occupational Therapist OT                  OT Recommendation and Plan  Planned Therapy Interventions (OT): activity tolerance training, adaptive equipment training, functional balance retraining, occupation/activity based interventions, patient/caregiver  education/training, transfer/mobility retraining, strengthening exercise, ROM/therapeutic exercise  Therapy Frequency (OT): 3 times/wk  Plan of Care Review  Plan of Care Reviewed With: patient  Progress: no change  Outcome Evaluation: Pt participated in OT today to complete ADL upright sitting in the chair. Pt was able to complete bathing but needed assist to wash RUE duue to poor L shoulder ROM. Pt also needed assist to wash figueroa area standing. Typically he can reach his figueroa area for bathing/toileting but today he is fearful his legs will give out on him if he lets go of the rwx. Pt stood CGA and static stood at the rwx but declined further mobility. He did report he was able to walk in PT eariler today. He has all needed DME to pivot transfer at home.     Time Calculation:   Evaluation Complexity (OT)  Review Occupational Profile/Medical/Therapy History Complexity: expanded/moderate complexity  Assessment, Occupational Performance/Identification of Deficit Complexity: 3-5 performance deficits  Clinical Decision Making Complexity (OT): detailed assessment/moderate complexity  Overall Complexity of Evaluation (OT): moderate complexity     Time Calculation- OT       Row Name 12/13/23 1525             Time Calculation- OT    OT Start Time 1351  -SM      OT Stop Time 1420  -SM      OT Time Calculation (min) 29 min  -SM      Total Timed Code Minutes- OT 29 minute(s)  -SM      OT Received On 12/13/23  -      OT - Next Appointment 12/15/23  -         Timed Charges    56450 - OT Self Care/Mgmt Minutes 29  -SM         Total Minutes    Timed Charges Total Minutes 29  -SM       Total Minutes 29  -SM                User Key  (r) = Recorded By, (t) = Taken By, (c) = Cosigned By      Initials Name Provider Type    Grecia Jacobs OT Occupational Therapist                  Therapy Charges for Today       Code Description Service Date Service Provider Modifiers Qty    10452975729 HC OT SELF CARE/MGMT/TRAIN EA 15 MIN  12/13/2023 Grecia Shelby, OT GO 2                 Grecia Shelby, SAMSON  12/13/2023

## 2023-12-13 NOTE — PLAN OF CARE
Goal Outcome Evaluation:  Plan of Care Reviewed With: patient        Progress: no change  Outcome Evaluation: Pt participated in OT today to complete ADL upright sitting in the chair. Pt was able to complete bathing but needed assist to wash RUE duue to poor L shoulder ROM. Pt also needed assist to wash figueroa area standing. Typically he can reach his figueroa area for bathing/toileting but today he is fearful his legs will give out on him if he lets go of the rwx. Pt stood CGA and static stood at the rwx but declined further mobility. He did report he was able to walk in PT eariler today. He has all needed DME to pivot transfer at home.      Anticipated Discharge Disposition (OT): home with home health, home with 24/7 care

## 2023-12-13 NOTE — THERAPY TREATMENT NOTE
Patient Name: Preston Wallis  : 1965    MRN: [id number]                              Today's Date: 2023       Admit Date: 2023    Visit Dx: No diagnosis found.  Patient Active Problem List   Diagnosis    Polyneuropathy    Paroxysmal atrial fibrillation    Obstructive sleep apnea    MRSA pneumonia    Low back pain    Chronic diastolic heart failure    Allergies    COPD exacerbation    Chronic anticoagulation    Benign prostatic hyperplasia    Impaired mobility and endurance    Stage 3a chronic kidney disease    Iron deficiency anemia secondary to inadequate dietary iron intake    Vitamin D deficiency    Class 3 severe obesity with serious comorbidity in adult    Lower extremity edema    Elevated alkaline phosphatase level    Venous insufficiency (chronic) (peripheral)    Tobacco abuse, in remission    History of Pseudomonas pneumonia    Chronic dyspnea    Gastroesophageal reflux disease    Bronchiectasis without complication    ERIC (acute kidney injury)    Altered mental status    Hyperlipidemia    Luetscher's syndrome    Neurogenic bladder    Class 1 obesity    Pneumonia due to Pseudomonas species    Seizures    Primary osteoarthritis of left knee    Other constipation    Chronic obstructive pulmonary disease    Type 2 DM with CKD stage 3 and hypertension    Essential hypertension    Stage 3b chronic kidney disease    Annual physical exam    Long-term use of high-risk medication    Personal history of PE (pulmonary embolism)    Encounter for aftercare for healing closed traumatic fracture of left femur    Chronic pain of left knee    Primary osteoarthritis of right knee    Sepsis    Bronchiectasis    Bacterial pneumonia    Anemia     Past Medical History:   Diagnosis Date    Age-related cognitive decline     Allergic contact dermatitis     Allergies     Anemia     Bronchiectasis with acute lower respiratory infection     Charcot foot due to diabetes mellitus 9/10/2013    Chronic diastolic  (congestive) heart failure     Chronic kidney disease     Chronic respiratory failure with hypoxia     Closed supracondylar fracture of femur 1/12/2022    COPD (chronic obstructive pulmonary disease)     Deep vein thrombosis (DVT) of lower extremity associated with air travel 1/13/2023    Dependence on supplemental oxygen     Eczema     Erectile dysfunction     due to organic reasons    Essential (primary) hypertension     Fracture     closed fracture of other tarsal and metatarsal bones    Fracture of proximal humerus 1/13/2023    GERD without esophagitis     High risk medication use     Hypercholesteremia     Hypomagnesemia     Infected stasis ulcer of left lower extremity 1/13/2023    Insomnia     Low back pain     Major depressive disorder     Morbid (severe) obesity due to excess calories     MRSA pneumonia     Muscle weakness     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 without evidence of necrosis     Obstructive sleep apnea (adult) (pediatric)     Other forms of dyspnea     Other long term (current) drug therapy     Other specified noninfective gastroenteritis and colitis     Other spondylosis, lumbar region     Pain in both knees     Paroxysmal atrial fibrillation     Peripheral neuropathy     attributed to type 2 diabetes    Pneumonia, unspecified organism     Polyneuropathy     Rash and other nonspecific skin eruption     Syncope and collapse     Tachycardia     Tinnitus 1/13/2023    Type 1 diabetes mellitus with diabetic chronic kidney disease     Type 2 diabetes mellitus     Unspecified fall, initial encounter     Urinary retention      Past Surgical History:   Procedure Laterality Date    CHOLECYSTECTOMY      CYSTOSCOPY      FEMUR SURGERY Left     Shravan placed    KNEE SURGERY Left     OTHER SURGICAL HISTORY Left     venous port    TONSILLECTOMY AND ADENOIDECTOMY        General Information       Row Name 12/13/23 1240          Physical Therapy Time and Intention     Document Type therapy note (daily note)  -PC     Mode of Treatment physical therapy  -PC       Row Name 12/13/23 1240          General Information    Patient Profile Reviewed yes  -PC     Existing Precautions/Restrictions fall;oxygen therapy device and L/min  -PC       Row Name 12/13/23 1240          Cognition    Orientation Status (Cognition) oriented x 4  -PC       Row Name 12/13/23 1240          Safety Issues, Functional Mobility    Impairments Affecting Function (Mobility) balance;endurance/activity tolerance;strength;shortness of breath  -PC               User Key  (r) = Recorded By, (t) = Taken By, (c) = Cosigned By      Initials Name Provider Type    PC Flower Pollock, PT Physical Therapist                   Mobility       Row Name 12/13/23 1241          Bed Mobility    Comment, (Bed Mobility) in chair upon entry  -PC       Row Name 12/13/23 1241          Sit-Stand Transfer    Sit-Stand [place] (Transfers) standby assist  -PC     Assistive Device (Sit-Stand Transfers) walker, front-wheeled  -PC       Row Name 12/13/23 1241          Gait/Stairs (Locomotion)    [place] Level (Gait) contact guard;1 person assist;1 person to manage equipment  -PC     Assistive Device (Gait) walker, front-wheeled  -PC     Distance in Feet (Gait) 8 ft x 2 with seated rest, followed with chair  -PC               User Key  (r) = Recorded By, (t) = Taken By, (c) = Cosigned By      Initials Name Provider Type    PC Flower Pollock, PT Physical Therapist                   Obj/Interventions    No documentation.                  Goals/Plan    No documentation.                  Clinical Impression       Row Name 12/13/23 1242          Pain    Pretreatment Pain Rating 0/10 - no pain  -PC       Row Name 12/13/23 1242          Plan of Care Review    Plan of Care Reviewed With patient  -PC     Outcome Evaluation pt cont to show good effort and is increasing his toleration to activity, able to take 2 short walks with CGA with a  rolling walker, followed with chair as pt fatigues quickly and legs give out per pt, pt has assist at home, nearing baseline level, pt is appropriate to go home with assist  -PC       Row Name 12/13/23 1242          Vital Signs    Post SpO2 (%) 100  -PC     O2 Delivery Post Treatment supplemental O2  -PC       Row Name 12/13/23 1242          Positioning and Restraints    Pre-Treatment Position sitting in chair/recliner  -PC     Post Treatment Position chair  -PC     In Chair reclined;call light within reach;encouraged to call for assist;exit alarm on  -PC               User Key  (r) = Recorded By, (t) = Taken By, (c) = Cosigned By      Initials Name Provider Type    PC Flower Pollock, PT Physical Therapist                   Outcome Measures       Row Name 12/13/23 1245 12/13/23 0846       How much help from another person do you currently need...    Turning from your back to your side while in flat bed without using bedrails? 3  -PC 3  -KE    Moving from lying on back to sitting on the side of a flat bed without bedrails? 3  -PC 3  -KE    Moving to and from a bed to a chair (including a wheelchair)? 3  -PC 3  -KE    Standing up from a chair using your arms (e.g., wheelchair, bedside chair)? 3  -PC 3  -KE    Climbing 3-5 steps with a railing? 1  -PC 1  -KE    To walk in hospital room? 3  -PC 2  -KE    AM-PAC 6 Clicks Score (PT) 16  -PC 15  -KE    Highest Level of Mobility Goal 5 --> Static standing  -PC 4 --> Transfer to chair/commode  -KE              User Key  (r) = Recorded By, (t) = Taken By, (c) = Cosigned By      Initials Name Provider Type    PC Flower Pollock, PT Physical Therapist    Oliva Lombardi, RN Registered Nurse                                 Physical Therapy Education       Title: PT OT SLP Therapies (In Progress)       Topic: Physical Therapy (Done)       Point: Mobility training (Done)       Learning Progress Summary             Patient Acceptance, E,D, DU by PC at 12/13/2023 1065     Acceptance, E,D, DU by PC at 12/12/2023 1426    Acceptance, E,D, DU by PC at 12/11/2023 1534    Acceptance, E, VU by ER at 12/7/2023 1144                         Point: Home exercise program (Done)       Learning Progress Summary             Patient Acceptance, E,D, DU by PC at 12/11/2023 1534    Acceptance, E,TB,D, VU,NR by  at 12/8/2023 1722    Acceptance, E, VU by ER at 12/7/2023 1144                         Point: Body mechanics (Done)       Learning Progress Summary             Patient Acceptance, E,D, DU by PC at 12/13/2023 1246    Acceptance, E,D, DU by PC at 12/12/2023 1426    Acceptance, E, VU by ER at 12/7/2023 1144                         Point: Precautions (Done)       Learning Progress Summary             Patient Acceptance, E,D, DU by PC at 12/13/2023 1246    Acceptance, E,D, DU by PC at 12/12/2023 1426    Acceptance, E, VU by ER at 12/7/2023 1144                                         User Key       Initials Effective Dates Name Provider Type Discipline     06/16/21 -  Flower Pollock, PT Physical Therapist PT     04/08/22 -  Lizet Mckeon, PT Physical Therapist PT    ER 10/15/23 -  Cat Burris, PT Physical Therapist PT                  PT Recommendation and Plan     Plan of Care Reviewed With: patient  Progress: improving  Outcome Evaluation: pt cont to show good effort and is increasing his toleration to activity, able to take 2 short walks with CGA with a rolling walker, followed with chair as pt fatigues quickly and legs give out per pt, pt has assist at home, nearing baseline level, pt is appropriate to go home with assist     Time Calculation:         PT Charges       Row Name 12/13/23 1246             Time Calculation    Start Time 1136  -PC      Stop Time 1148  -PC      Time Calculation (min) 12 min  -PC      PT Received On 12/13/23  -PC      PT - Next Appointment 12/14/23  -                User Key  (r) = Recorded By, (t) = Taken By, (c) = Cosigned By      Initials Name Provider  Type    PC Flower Pollock, PT Physical Therapist                  Therapy Charges for Today       Code Description Service Date Service Provider Modifiers Qty    10711708709 HC PT THER PROC EA 15 MIN 12/12/2023 Flower Pollock, PT GP 1    88503961704 HC PT THER PROC EA 15 MIN 12/13/2023 Flower Pollock, PT GP 1    22115710713 HC PT THER SUPP EA 15 MIN 12/13/2023 Flower Pollock, PT GP 1            PT G-Codes  Outcome Measure Options: AM-PAC 6 Clicks Basic Mobility (PT)  AM-PAC 6 Clicks Score (PT): 16  AM-PAC 6 Clicks Score (OT): 14  PT Discharge Summary  Anticipated Discharge Disposition (PT): home with assist, home with home health    Flower Pollock, PT  12/13/2023

## 2023-12-13 NOTE — PLAN OF CARE
Goal Outcome Evaluation:    Vss overnight on 2l. IV abx. Wound care completed. Port needle exchanged.

## 2023-12-13 NOTE — PROGRESS NOTES
Name: Preston Wallis ADMIT: 2023   : 1965  PCP: Kimmy Riley MD    MRN: [id number] LOS: 7 days   AGE/SEX: 58 y.o. male  ROOM: [facility]/     Subjective   Subjective   No chief complaint on file.    He reports that he is feeling improved.  No chest pain or palpitations reported.  No nausea vomiting or abdominal pain.  Nursing reports that he has had multiple snacks throughout the day and late in the evening. Was asking about potential discharge.     Objective   Objective   Vital Signs  Temp:  [97.3 °F (36.3 °C)-98.2 °F (36.8 °C)] 97.9 °F (36.6 °C)  Heart Rate:  [72-94] 72  Resp:  [15-20] 16  BP: (118-164)/(56-82) 118/56  SpO2:  [97 %-100 %] 100 %  on  Flow (L/min):  [2] 2;   Device (Oxygen Therapy): nasal cannula  Body mass index is 41.02 kg/m².    Physical Exam  Vitals and nursing note reviewed.   Constitutional:       General: He is not in acute distress.     Appearance: He is obese. He is ill-appearing (chronically). He is not diaphoretic.   HENT:      Head: Atraumatic.   Eyes:      Conjunctiva/sclera: Conjunctivae normal.   Cardiovascular:      Rate and Rhythm: Normal rate and regular rhythm.      Pulses: Normal pulses.   Pulmonary:      Effort: Pulmonary effort is normal.      Breath sounds: No wheezing or rales.   Abdominal:      General: There is no distension.      Palpations: Abdomen is soft.      Tenderness: There is no abdominal tenderness. There is no guarding or rebound.   Musculoskeletal:         General: Swelling present. No tenderness.   Skin:     General: Skin is warm and dry.   Neurological:      Mental Status: He is alert. Mental status is at baseline.   Psychiatric:         Mood and Affect: Mood normal.         Behavior: Behavior normal.       Results Review  I reviewed the patient's new clinical results.    Results from last 7 days   Lab Units 23  0540 23  0712 23  0553 12/10/23  0608   WBC 10*3/mm3 14.24* 13.83* 14.03* 15.63*   HEMOGLOBIN g/dL 8.0* 8.1* 7.6*  8.3*   PLATELETS 10*3/mm3 415 474* 453* 460*     Results from last 7 days   Lab Units 12/13/23  0540 12/12/23  0859 12/11/23  0849 12/10/23  0608   SODIUM mmol/L 137 137 138 136   POTASSIUM mmol/L 4.7 4.7 4.9 5.0   CHLORIDE mmol/L 99 101 101 99   CO2 mmol/L 27.0 27.0 25.5 26.0   BUN mg/dL 24* 27* 31* 29*   CREATININE mg/dL 1.85* 1.86* 2.10* 2.27*   GLUCOSE mg/dL 328* 317* 396* 270*   EGFR mL/min/1.73 41.7* 41.4* 35.8* 32.6*     Results from last 7 days   Lab Units 12/13/23  0540 12/12/23  0859 12/11/23  0849 12/10/23  0608 12/09/23  0624 12/08/23  0657 12/06/23  2132   ALBUMIN g/dL 3.1* 3.3* 3.0* 3.1* 3.1* 3.1* 2.8*   BILIRUBIN mg/dL  --   --   --  <0.2 0.2 0.2 0.3   ALK PHOS U/L  --   --   --  138* 132* 130* 124*   AST (SGOT) U/L  --   --   --  20 21 23 17   ALT (SGPT) U/L  --   --   --  24 27 27 22     Results from last 7 days   Lab Units 12/13/23  0540 12/12/23  0859 12/12/23  0712 12/11/23  0849 12/11/23  0553 12/10/23  0608 12/09/23  0624   CALCIUM mg/dL 9.1 9.4  --  9.2  --  9.0 9.0   ALBUMIN g/dL 3.1* 3.3*  --  3.0*  --  3.1* 3.1*   MAGNESIUM mg/dL 1.8  --  2.0  --  2.0 2.0 2.1   PHOSPHORUS mg/dL 3.5 3.6  --  3.2  --   --  3.1     Results from last 7 days   Lab Units 12/06/23  2132   PROCALCITONIN ng/mL 1.33*   LACTATE mmol/L 1.3     Hemoglobin A1C   Date/Time Value Ref Range Status   12/13/2023 0540 8.20 (H) 4.80 - 5.60 % Final     Glucose   Date/Time Value Ref Range Status   12/13/2023 1129 245 (H) 70 - 130 mg/dL Final   12/13/2023 0624 331 (H) 70 - 130 mg/dL Final   12/12/2023 2045 303 (H) 70 - 130 mg/dL Final   12/12/2023 1602 362 (H) 70 - 130 mg/dL Final   12/12/2023 1204 327 (H) 70 - 130 mg/dL Final   12/12/2023 0627 320 (H) 70 - 130 mg/dL Final   12/11/2023 2305 273 (H) 70 - 130 mg/dL Final       No radiology results for the last day    I have personally reviewed all medications:  Scheduled Medications  atorvastatin, 20 mg, Oral, Nightly  budesonide-formoterol, 2 puff, Inhalation, BID - RT  docusate  sodium, 100 mg, Oral, Daily  famotidine, 40 mg, Oral, QAM  finasteride, 5 mg, Oral, Daily  gabapentin, 300 mg, Oral, Q8H  heparin (porcine), 5,000 Units, Subcutaneous, Q8H  hydrocortisone-bacitracin-zinc oxide-nystatin, 1 application , Topical, Q12H  insulin glargine, 50 Units, Subcutaneous, Nightly  insulin lispro, 16 Units, Subcutaneous, TID With Meals  insulin lispro, 2-9 Units, Subcutaneous, 4x Daily AC & at Bedtime  ipratropium-albuterol, 3 mL, Nebulization, Q4H - RT  metoprolol tartrate, 100 mg, Oral, BID  montelukast, 10 mg, Oral, Daily  mupirocin, 1 application , Topical, Q12H  NIFEdipine XL, 30 mg, Oral, Q24H  piperacillin-tazobactam, 4.5 g, Intravenous, Q8H  senna-docusate sodium, 2 tablet, Oral, BID  sodium chloride, 4 mL, Nebulization, BID - RT      Infusions     Diet  Diet: Cardiac Diets, Diabetic Diets; Healthy Heart (2-3 Na+); Consistent Carbohydrate; Texture: Regular Texture (IDDSI 7); Fluid Consistency: Thin (IDDSI 0)    I have personally reviewed:  [x]  Laboratory   [x]  Microbiology   [x]  Radiology   [x]  EKG/Telemetry  []  Cardiology/Vascular   []  Pathology    []  Records       Assessment/Plan     Active Hospital Problems    Diagnosis  POA    **Bacterial pneumonia [J15.9]  Yes    Bronchiectasis [J47.9]  Yes    Anemia [D64.9]  Yes    Sepsis [A41.9]  Yes    Essential hypertension [I10]  Yes    Chronic obstructive pulmonary disease [J44.9]  Yes    Stage 3b chronic kidney disease [N18.32]  Yes    Neurogenic bladder [N31.9]  Yes    Hyperlipidemia [E78.5]  Yes    Paroxysmal atrial fibrillation [I48.0]  Yes    Obstructive sleep apnea [G47.33]  Yes    Chronic diastolic heart failure [I50.32]  Yes    ERIC (acute kidney injury) [N17.9]  Yes      Resolved Hospital Problems   No resolved problems to display.       58 y.o. male admitted with Bacterial pneumonia.    Pneumonia: MRSA and Achromobacter.  Completing antibiotic course tomorrow.  Pulmonology and infectious disease evaluated.  Outpatient follow-up  with pulmonology planned.  COPD, bronchiectasis: Continue breathing treatments.  ERIC on CKD 3b: Creatinine is improving and closer to baseline.  Nephrology following.  Diabetes: A1c 8.2.  Frequent snacking is contributing.  He is on diabetic diet.  Increase Premeal insulin to 16 units scheduled plus SSI.  Continue Lantus 50 units.  Titrate based on requirements.  Hypertension: Was elevated in the morning but has been more acceptable this afternoon.  Will monitor.  Anemia: Transfuse as needed  Abdominal wall cellulitis/abscess: I&D at [facility] day by surgery.  Outpatient follow-up.  PAF: Eliquis was held due to bleeding from the I&D site previously.  No acute blood loss more recently and hemoglobin is stable overnight.  Going to monitor hemoglobin and can consider resuming the anticoagulation tomorrow.   Neurogenic bladder: CIC  PPX: See above  Disposition: Home/possibly tomorrow    Expected Discharge Date: 12/14/2023; Expected Discharge Time:      Deshawn Moser MD  [facility]  12/13/23  15:13 EST    Dictated portions of note using Dragon dictation software.  Copied text in this note has been reviewed by me and remains accurate as of 12/13/23

## 2023-12-13 NOTE — PLAN OF CARE
Goal Outcome Evaluation:  Plan of Care Reviewed With: patient        Progress: improving  Outcome Evaluation: pt cont to show good effort and is increasing his toleration to activity, able to take 2 short walks with CGA with a rolling walker, followed with chair as pt fatigues quickly and legs give out per pt, pt has assist at home, nearing baseline level, pt is appropriate to go home with assist      Anticipated Discharge Disposition (PT): home with assist, home with home health

## 2023-12-13 NOTE — PROGRESS NOTES
[facility] Progress Note      Patient Name: Preston Wallis  : 1965  MRN: [id number]  Primary Care Physician:  Kimmy Riley MD  Date of admission: 2023    Subjective     Interval History:   Follow-up acute kidney injury on CKD 3B.  Sitting up in chair.  Feels much better.  Blood sugars running in the 300s.  He reports that he can go to bed without eating and get up with a blood sugar 200-300.  Here he snacks frequently.  Urinating via Doyle.  Does self cath at home.  On home oxygen continuously.  Review of Systems:   As noted above    Objective     Vitals:   Temp:  [97.3 °F (36.3 °C)-98.2 °F (36.8 °C)] 97.3 °F (36.3 °C)  Heart Rate:  [74-94] 74  Resp:  [15-20] 16  BP: (146-165)/(71-82) 146/71  Flow (L/min):  [2] 2    Intake/Output Summary (Last 24 hours) at 2023 1258  Last data filed at 2023 1207  Gross per 24 hour   Intake --   Output 4375 ml   Net -4375 ml       Physical Exam:    General Appearance: alert, oriented x 3, no acute distress .  Being up in chair.  Nasal O2.  Skin: warm and dry  HEENT: oral mucosa normal, nonicteric sclera  Neck: supple, no JVD  Lungs: Decreased breath sounds in the bases bilaterally.  No wheezing.  Unlabored.  Heart: RRR, no 3 or rub.  Abdomen: soft, nontender, nondistended.  Obese.+bs  : Fully catheter  Extremities: 1+ lower extremity edema  Neuro: normal speech and mental status     Scheduled Meds:     atorvastatin, 20 mg, Oral, Nightly  budesonide-formoterol, 2 puff, Inhalation, BID - RT  docusate sodium, 100 mg, Oral, Daily  famotidine, 40 mg, Oral, QAM  finasteride, 5 mg, Oral, Daily  gabapentin, 300 mg, Oral, Q8H  heparin (porcine), 5,000 Units, Subcutaneous, Q8H  hydrocortisone-bacitracin-zinc oxide-nystatin, 1 application , Topical, Q12H  insulin glargine, 50 Units, Subcutaneous, Nightly  insulin lispro, 4-24 Units, Subcutaneous, 4x Daily AC & at Bedtime  ipratropium-albuterol, 3 mL, Nebulization, Q4H -  RT  metoprolol tartrate, 100 mg, Oral, BID  montelukast, 10 mg, Oral, Daily  mupirocin, 1 application , Topical, Q12H  NIFEdipine XL, 30 mg, Oral, Q24H  piperacillin-tazobactam, 4.5 g, Intravenous, Q8H  senna-docusate sodium, 2 tablet, Oral, BID  sodium chloride, 4 mL, Nebulization, BID - RT      IV Meds:        Results Reviewed:   I have personally reviewed the results from the time of this admission to 12/13/2023 12:58 EST     Results from last 7 days   Lab Units 12/13/23  0540 12/12/23  0859 12/11/23  0849 12/10/23  0608 12/09/23  0624 12/08/23  0657   SODIUM mmol/L 137 137 138 136 137 141   POTASSIUM mmol/L 4.7 4.7 4.9 5.0 4.2 4.2   CHLORIDE mmol/L 99 101 101 99 98 102   CO2 mmol/L 27.0 27.0 25.5 26.0 26.7 27.0   BUN mg/dL 24* 27* 31* 29* 26* 35*   CREATININE mg/dL 1.85* 1.86* 2.10* 2.27* 2.36* 2.71*   CALCIUM mg/dL 9.1 9.4 9.2 9.0 9.0 9.0   BILIRUBIN mg/dL  --   --   --  <0.2 0.2 0.2   ALK PHOS U/L  --   --   --  138* 132* 130*   ALT (SGPT) U/L  --   --   --  24 27 27   AST (SGOT) U/L  --   --   --  20 21 23   GLUCOSE mg/dL 328* 317* 396* 270* 252* 167*       Estimated Creatinine Clearance: 57.1 mL/min (A) (by C-G formula based on SCr of 1.85 mg/dL (H)).    Results from last 7 days   Lab Units 12/13/23  0540 12/12/23  0859 12/12/23  0712 12/11/23  0849 12/11/23  0553   MAGNESIUM mg/dL 1.8  --  2.0  --  2.0   PHOSPHORUS mg/dL 3.5 3.6  --  3.2  --        Results from last 7 days   Lab Units 12/13/23  0540 12/12/23  0712 12/09/23  0624 12/08/23  0657   URIC ACID mg/dL 4.2 4.1 4.7 5.7       Results from last 7 days   Lab Units 12/13/23  0540 12/12/23  0712 12/11/23  0553 12/10/23  0608 12/09/23  0624   WBC 10*3/mm3 14.24* 13.83* 14.03* 15.63* 17.71*   HEMOGLOBIN g/dL 8.0* 8.1* 7.6* 8.3* 8.1*   PLATELETS 10*3/mm3 415 474* 453* 460* 463*       Results from last 7 days   Lab Units 12/06/23  2132   INR  1.34*       Assessment / Plan     ASSESSMENT:  Acute kidney injury on CKD 3B, baseline 1.6-1.8.  Subnephrotic  proteinuria 2.5 g by urine protein to creatinine ratio.  Creatinine stable the past 2 days.  Diuresing without diuretic likely due to hyperglycemia.  2.  Right lower lobe pneumonia, bronchiectasis.  Acinetobacter on outside culture per Dr. Hinds's ID note and MRSA nares swab positive.  Repeat sputum culture with no staph or Pseudomonas.  Currently on Zosyn.  3.  COPD with acute exacerbation, acute on chronic hypoxic respiratory failure.  4.  Diabetes mellitus type II.  Not controlled.  Will speak with Dr. Moser about his blood sugar control.  5.  Neurogenic bladder on intermittent self cath at home.  Currently has Doyle catheter.  PLAN:  No Diuretic today.  2.  Will discuss blood sugar control with Dr. Moser.  Thank you for involving us in the care of Preston Wallis.  Please feel free to call with any questions.    Kelly Mccarthy MD  12/13/23  12:58 EST    [facility]""  [phone]    Please note that portions of this note were completed with a voice recognition program.

## 2023-12-14 ENCOUNTER — APPOINTMENT (OUTPATIENT)
Dept: GENERAL RADIOLOGY | Facility: HOSPITAL | Age: 58
DRG: 853 | End: 2023-12-14
Payer: COMMERCIAL

## 2023-12-14 ENCOUNTER — READMISSION MANAGEMENT (OUTPATIENT)
Dept: CALL CENTER | Facility: HOSPITAL | Age: 58
End: 2023-12-14
Payer: COMMERCIAL

## 2023-12-14 ENCOUNTER — TRANSITIONAL CARE MANAGEMENT TELEPHONE ENCOUNTER (OUTPATIENT)
Dept: CALL CENTER | Facility: HOSPITAL | Age: 58
End: 2023-12-14
Payer: COMMERCIAL

## 2023-12-14 ENCOUNTER — HOSPITAL ENCOUNTER (INPATIENT)
Facility: HOSPITAL | Age: 58
LOS: 7 days | Discharge: HOME | DRG: 853 | End: 2023-12-28
Attending: STUDENT IN AN ORGANIZED HEALTH CARE EDUCATION/TRAINING PROGRAM | Admitting: INTERNAL MEDICINE
Payer: COMMERCIAL

## 2023-12-14 VITALS
TEMPERATURE: 97.7 F | OXYGEN SATURATION: 100 % | DIASTOLIC BLOOD PRESSURE: 82 MMHG | HEART RATE: 72 BPM | RESPIRATION RATE: 16 BRPM | SYSTOLIC BLOOD PRESSURE: 152 MMHG | WEIGHT: 277.78 LBS | BODY MASS INDEX: 41.14 KG/M2 | HEIGHT: 69 IN

## 2023-12-14 DIAGNOSIS — N18.9 CHRONIC KIDNEY DISEASE, UNSPECIFIED CKD STAGE: ICD-10-CM

## 2023-12-14 DIAGNOSIS — S82.142A CLOSED FRACTURE OF LEFT TIBIAL PLATEAU, INITIAL ENCOUNTER: ICD-10-CM

## 2023-12-14 DIAGNOSIS — Z74.09 IMMOBILITY: ICD-10-CM

## 2023-12-14 DIAGNOSIS — E11.69 TYPE 2 DIABETES MELLITUS WITH OTHER SPECIFIED COMPLICATION, UNSPECIFIED WHETHER LONG TERM INSULIN USE: ICD-10-CM

## 2023-12-14 DIAGNOSIS — S82.132A CLOSED FRACTURE OF MEDIAL PORTION OF LEFT TIBIAL PLATEAU, INITIAL ENCOUNTER: Primary | ICD-10-CM

## 2023-12-14 DIAGNOSIS — J44.9 CHRONIC OBSTRUCTIVE PULMONARY DISEASE, UNSPECIFIED COPD TYPE: ICD-10-CM

## 2023-12-14 LAB
ALBUMIN SERPL-MCNC: 3.3 G/DL (ref 3.5–5.2)
ALBUMIN SERPL-MCNC: 3.6 G/DL (ref 3.5–5.2)
ALBUMIN/GLOB SERPL: 0.9 G/DL
ALP SERPL-CCNC: 129 U/L (ref 39–117)
ALT SERPL W P-5'-P-CCNC: 26 U/L (ref 1–41)
ANION GAP SERPL CALCULATED.3IONS-SCNC: 11.4 MMOL/L (ref 5–15)
ANION GAP SERPL CALCULATED.3IONS-SCNC: 8.9 MMOL/L (ref 5–15)
AST SERPL-CCNC: 17 U/L (ref 1–40)
BASOPHILS # BLD AUTO: 0.09 10*3/MM3 (ref 0–0.2)
BASOPHILS NFR BLD AUTO: 0.7 % (ref 0–1.5)
BILIRUB SERPL-MCNC: <0.2 MG/DL (ref 0–1.2)
BUN SERPL-MCNC: 26 MG/DL (ref 6–20)
BUN SERPL-MCNC: 26 MG/DL (ref 6–20)
BUN/CREAT SERPL: 12.5 (ref 7–25)
BUN/CREAT SERPL: 13.4 (ref 7–25)
CALCIUM SPEC-SCNC: 9.2 MG/DL (ref 8.6–10.5)
CALCIUM SPEC-SCNC: 9.2 MG/DL (ref 8.6–10.5)
CHLORIDE SERPL-SCNC: 100 MMOL/L (ref 98–107)
CHLORIDE SERPL-SCNC: 101 MMOL/L (ref 98–107)
CO2 SERPL-SCNC: 25.6 MMOL/L (ref 22–29)
CO2 SERPL-SCNC: 27.1 MMOL/L (ref 22–29)
CREAT SERPL-MCNC: 1.94 MG/DL (ref 0.76–1.27)
CREAT SERPL-MCNC: 2.08 MG/DL (ref 0.76–1.27)
DEPRECATED RDW RBC AUTO: 42.9 FL (ref 37–54)
DEPRECATED RDW RBC AUTO: 45.8 FL (ref 37–54)
EGFRCR SERPLBLD CKD-EPI 2021: 36.2 ML/MIN/1.73
EGFRCR SERPLBLD CKD-EPI 2021: 39.4 ML/MIN/1.73
EOSINOPHIL # BLD AUTO: 0.59 10*3/MM3 (ref 0–0.4)
EOSINOPHIL NFR BLD AUTO: 4.8 % (ref 0.3–6.2)
ERYTHROCYTE [DISTWIDTH] IN BLOOD BY AUTOMATED COUNT: 13.1 % (ref 12.3–15.4)
ERYTHROCYTE [DISTWIDTH] IN BLOOD BY AUTOMATED COUNT: 13.5 % (ref 12.3–15.4)
GLOBULIN UR ELPH-MCNC: 4.1 GM/DL
GLUCOSE BLDC GLUCOMTR-MCNC: 125 MG/DL (ref 70–130)
GLUCOSE BLDC GLUCOMTR-MCNC: 199 MG/DL (ref 70–130)
GLUCOSE BLDC GLUCOMTR-MCNC: 247 MG/DL (ref 70–130)
GLUCOSE BLDC GLUCOMTR-MCNC: 75 MG/DL (ref 70–130)
GLUCOSE SERPL-MCNC: 157 MG/DL (ref 65–99)
GLUCOSE SERPL-MCNC: 184 MG/DL (ref 65–99)
HCT VFR BLD AUTO: 27 % (ref 37.5–51)
HCT VFR BLD AUTO: 30.9 % (ref 37.5–51)
HGB BLD-MCNC: 8.5 G/DL (ref 13–17.7)
HGB BLD-MCNC: 9.6 G/DL (ref 13–17.7)
IMM GRANULOCYTES # BLD AUTO: 0.08 10*3/MM3 (ref 0–0.05)
IMM GRANULOCYTES NFR BLD AUTO: 0.6 % (ref 0–0.5)
LYMPHOCYTES # BLD AUTO: 2.54 10*3/MM3 (ref 0.7–3.1)
LYMPHOCYTES NFR BLD AUTO: 20.5 % (ref 19.6–45.3)
MAGNESIUM SERPL-MCNC: 1.9 MG/DL (ref 1.6–2.6)
MCH RBC QN AUTO: 28.3 PG (ref 26.6–33)
MCH RBC QN AUTO: 28.7 PG (ref 26.6–33)
MCHC RBC AUTO-ENTMCNC: 31.1 G/DL (ref 31.5–35.7)
MCHC RBC AUTO-ENTMCNC: 31.5 G/DL (ref 31.5–35.7)
MCV RBC AUTO: 90 FL (ref 79–97)
MCV RBC AUTO: 92.2 FL (ref 79–97)
MONOCYTES # BLD AUTO: 1.03 10*3/MM3 (ref 0.1–0.9)
MONOCYTES NFR BLD AUTO: 8.3 % (ref 5–12)
NEUTROPHILS NFR BLD AUTO: 65.1 % (ref 42.7–76)
NEUTROPHILS NFR BLD AUTO: 8.04 10*3/MM3 (ref 1.7–7)
NRBC BLD AUTO-RTO: 0.1 /100 WBC (ref 0–0.2)
PHOSPHATE SERPL-MCNC: 3.8 MG/DL (ref 2.5–4.5)
PLATELET # BLD AUTO: 408 10*3/MM3 (ref 140–450)
PLATELET # BLD AUTO: 414 10*3/MM3 (ref 140–450)
PMV BLD AUTO: 8.4 FL (ref 6–12)
PMV BLD AUTO: 8.5 FL (ref 6–12)
POTASSIUM SERPL-SCNC: 4.1 MMOL/L (ref 3.5–5.2)
POTASSIUM SERPL-SCNC: 4.5 MMOL/L (ref 3.5–5.2)
PROT SERPL-MCNC: 7.7 G/DL (ref 6–8.5)
RBC # BLD AUTO: 3 10*6/MM3 (ref 4.14–5.8)
RBC # BLD AUTO: 3.35 10*6/MM3 (ref 4.14–5.8)
SODIUM SERPL-SCNC: 137 MMOL/L (ref 136–145)
SODIUM SERPL-SCNC: 137 MMOL/L (ref 136–145)
WBC NRBC COR # BLD AUTO: 12.37 10*3/MM3 (ref 3.4–10.8)
WBC NRBC COR # BLD AUTO: 13.47 10*3/MM3 (ref 3.4–10.8)

## 2023-12-14 PROCEDURE — 84100 ASSAY OF PHOSPHORUS: CPT | Performed by: INTERNAL MEDICINE

## 2023-12-14 PROCEDURE — 73590 X-RAY EXAM OF LOWER LEG: CPT

## 2023-12-14 PROCEDURE — 25810000003 SODIUM CHLORIDE 0.9 % SOLUTION: Performed by: INTERNAL MEDICINE

## 2023-12-14 PROCEDURE — 25010000002 PIPERACILLIN SOD-TAZOBACTAM PER 1 G: Performed by: STUDENT IN AN ORGANIZED HEALTH CARE EDUCATION/TRAINING PROGRAM

## 2023-12-14 PROCEDURE — 94799 UNLISTED PULMONARY SVC/PX: CPT

## 2023-12-14 PROCEDURE — G0378 HOSPITAL OBSERVATION PER HR: HCPCS

## 2023-12-14 PROCEDURE — 25010000002 ONDANSETRON PER 1 MG: Performed by: STUDENT IN AN ORGANIZED HEALTH CARE EDUCATION/TRAINING PROGRAM

## 2023-12-14 PROCEDURE — 73560 X-RAY EXAM OF KNEE 1 OR 2: CPT

## 2023-12-14 PROCEDURE — 83735 ASSAY OF MAGNESIUM: CPT | Performed by: INTERNAL MEDICINE

## 2023-12-14 PROCEDURE — 63710000001 INSULIN LISPRO (HUMAN) PER 5 UNITS: Performed by: INTERNAL MEDICINE

## 2023-12-14 PROCEDURE — 82948 REAGENT STRIP/BLOOD GLUCOSE: CPT

## 2023-12-14 PROCEDURE — 80053 COMPREHEN METABOLIC PANEL: CPT | Performed by: INTERNAL MEDICINE

## 2023-12-14 PROCEDURE — 25010000002 HEPARIN LOCK FLUSH PER 10 UNITS: Performed by: INTERNAL MEDICINE

## 2023-12-14 PROCEDURE — 85027 COMPLETE CBC AUTOMATED: CPT | Performed by: INTERNAL MEDICINE

## 2023-12-14 PROCEDURE — 94761 N-INVAS EAR/PLS OXIMETRY MLT: CPT

## 2023-12-14 PROCEDURE — 25010000002 MORPHINE PER 10 MG: Performed by: STUDENT IN AN ORGANIZED HEALTH CARE EDUCATION/TRAINING PROGRAM

## 2023-12-14 PROCEDURE — 94664 DEMO&/EVAL PT USE INHALER: CPT

## 2023-12-14 PROCEDURE — 85025 COMPLETE CBC W/AUTO DIFF WBC: CPT | Performed by: PHYSICIAN ASSISTANT

## 2023-12-14 PROCEDURE — 25010000002 HEPARIN (PORCINE) PER 1000 UNITS: Performed by: HOSPITALIST

## 2023-12-14 PROCEDURE — 25010000002 HYDROMORPHONE PER 4 MG: Performed by: INTERNAL MEDICINE

## 2023-12-14 RX ORDER — HEPARIN SODIUM (PORCINE) LOCK FLUSH IV SOLN 100 UNIT/ML 100 UNIT/ML
500 SOLUTION INTRAVENOUS ONCE
Status: COMPLETED | OUTPATIENT
Start: 2023-12-14 | End: 2023-12-14

## 2023-12-14 RX ORDER — ACETAMINOPHEN 325 MG/1
650 TABLET ORAL EVERY 4 HOURS PRN
Status: DISCONTINUED | OUTPATIENT
Start: 2023-12-14 | End: 2023-12-28 | Stop reason: HOSPADM

## 2023-12-14 RX ORDER — ONDANSETRON 4 MG/1
4 TABLET, FILM COATED ORAL EVERY 6 HOURS PRN
Status: DISCONTINUED | OUTPATIENT
Start: 2023-12-14 | End: 2023-12-28 | Stop reason: HOSPADM

## 2023-12-14 RX ORDER — HYDROCODONE BITARTRATE AND ACETAMINOPHEN 7.5; 325 MG/1; MG/1
1 TABLET ORAL EVERY 6 HOURS PRN
Status: DISPENSED | OUTPATIENT
Start: 2023-12-14 | End: 2023-12-21

## 2023-12-14 RX ORDER — INSULIN LISPRO 100 [IU]/ML
2-7 INJECTION, SOLUTION INTRAVENOUS; SUBCUTANEOUS
Status: DISCONTINUED | OUTPATIENT
Start: 2023-12-14 | End: 2023-12-28 | Stop reason: HOSPADM

## 2023-12-14 RX ORDER — BISACODYL 5 MG/1
5 TABLET, DELAYED RELEASE ORAL DAILY PRN
Status: DISCONTINUED | OUTPATIENT
Start: 2023-12-14 | End: 2023-12-28 | Stop reason: HOSPADM

## 2023-12-14 RX ORDER — SODIUM CHLORIDE 9 MG/ML
40 INJECTION, SOLUTION INTRAVENOUS AS NEEDED
Status: DISCONTINUED | OUTPATIENT
Start: 2023-12-14 | End: 2023-12-28 | Stop reason: HOSPADM

## 2023-12-14 RX ORDER — SODIUM CHLORIDE 0.9 % (FLUSH) 0.9 %
10 SYRINGE (ML) INJECTION AS NEEDED
Status: DISCONTINUED | OUTPATIENT
Start: 2023-12-14 | End: 2023-12-28 | Stop reason: HOSPADM

## 2023-12-14 RX ORDER — ONDANSETRON 2 MG/ML
4 INJECTION INTRAMUSCULAR; INTRAVENOUS EVERY 6 HOURS PRN
Status: DISCONTINUED | OUTPATIENT
Start: 2023-12-14 | End: 2023-12-28 | Stop reason: HOSPADM

## 2023-12-14 RX ORDER — SODIUM CHLORIDE 0.9 % (FLUSH) 0.9 %
10 SYRINGE (ML) INJECTION EVERY 12 HOURS SCHEDULED
Status: DISCONTINUED | OUTPATIENT
Start: 2023-12-14 | End: 2023-12-28 | Stop reason: HOSPADM

## 2023-12-14 RX ORDER — NICOTINE POLACRILEX 4 MG
15 LOZENGE BUCCAL
Status: DISCONTINUED | OUTPATIENT
Start: 2023-12-14 | End: 2023-12-28 | Stop reason: HOSPADM

## 2023-12-14 RX ORDER — HEPARIN SODIUM (PORCINE) LOCK FLUSH IV SOLN 100 UNIT/ML 100 UNIT/ML
5 SOLUTION INTRAVENOUS AS NEEDED
Status: DISCONTINUED | OUTPATIENT
Start: 2023-12-14 | End: 2023-12-28 | Stop reason: HOSPADM

## 2023-12-14 RX ORDER — NALOXONE HCL 0.4 MG/ML
0.4 VIAL (ML) INJECTION
Status: DISCONTINUED | OUTPATIENT
Start: 2023-12-14 | End: 2023-12-28 | Stop reason: HOSPADM

## 2023-12-14 RX ORDER — SODIUM CHLORIDE 0.9 % (FLUSH) 0.9 %
20 SYRINGE (ML) INJECTION AS NEEDED
Status: DISCONTINUED | OUTPATIENT
Start: 2023-12-14 | End: 2023-12-28 | Stop reason: HOSPADM

## 2023-12-14 RX ORDER — AMOXICILLIN 250 MG
2 CAPSULE ORAL 2 TIMES DAILY
Status: DISCONTINUED | OUTPATIENT
Start: 2023-12-14 | End: 2023-12-28 | Stop reason: HOSPADM

## 2023-12-14 RX ORDER — INSULIN GLARGINE 100 [IU]/ML
50 INJECTION, SOLUTION SUBCUTANEOUS DAILY
Qty: 15 ML | Refills: 0 | Status: SHIPPED | OUTPATIENT
Start: 2023-12-14

## 2023-12-14 RX ORDER — HYDROMORPHONE HYDROCHLORIDE 1 MG/ML
0.5 INJECTION, SOLUTION INTRAMUSCULAR; INTRAVENOUS; SUBCUTANEOUS
Status: DISPENSED | OUTPATIENT
Start: 2023-12-14 | End: 2023-12-21

## 2023-12-14 RX ORDER — SODIUM CHLORIDE 9 MG/ML
75 INJECTION, SOLUTION INTRAVENOUS CONTINUOUS
Status: DISCONTINUED | OUTPATIENT
Start: 2023-12-14 | End: 2023-12-16

## 2023-12-14 RX ORDER — UREA 10 %
3 LOTION (ML) TOPICAL NIGHTLY PRN
Status: DISCONTINUED | OUTPATIENT
Start: 2023-12-14 | End: 2023-12-28 | Stop reason: HOSPADM

## 2023-12-14 RX ORDER — DEXTROSE MONOHYDRATE 25 G/50ML
25 INJECTION, SOLUTION INTRAVENOUS
Status: DISCONTINUED | OUTPATIENT
Start: 2023-12-14 | End: 2023-12-28 | Stop reason: HOSPADM

## 2023-12-14 RX ORDER — POLYETHYLENE GLYCOL 3350 17 G/17G
17 POWDER, FOR SOLUTION ORAL DAILY PRN
Status: DISCONTINUED | OUTPATIENT
Start: 2023-12-14 | End: 2023-12-28 | Stop reason: HOSPADM

## 2023-12-14 RX ORDER — HYDROCODONE BITARTRATE AND ACETAMINOPHEN 5; 325 MG/1; MG/1
1 TABLET ORAL EVERY 4 HOURS PRN
Status: DISPENSED | OUTPATIENT
Start: 2023-12-14 | End: 2023-12-24

## 2023-12-14 RX ORDER — BISACODYL 10 MG
10 SUPPOSITORY, RECTAL RECTAL DAILY PRN
Status: DISCONTINUED | OUTPATIENT
Start: 2023-12-14 | End: 2023-12-28 | Stop reason: HOSPADM

## 2023-12-14 RX ORDER — MORPHINE SULFATE 2 MG/ML
4 INJECTION, SOLUTION INTRAMUSCULAR; INTRAVENOUS ONCE
Status: COMPLETED | OUTPATIENT
Start: 2023-12-14 | End: 2023-12-14

## 2023-12-14 RX ORDER — ONDANSETRON 2 MG/ML
4 INJECTION INTRAMUSCULAR; INTRAVENOUS ONCE
Status: COMPLETED | OUTPATIENT
Start: 2023-12-14 | End: 2023-12-14

## 2023-12-14 RX ORDER — IBUPROFEN 600 MG/1
1 TABLET ORAL
Status: DISCONTINUED | OUTPATIENT
Start: 2023-12-14 | End: 2023-12-28 | Stop reason: HOSPADM

## 2023-12-14 RX ADMIN — BUDESONIDE AND FORMOTEROL FUMARATE DIHYDRATE 2 PUFF: 160; 4.5 AEROSOL RESPIRATORY (INHALATION) at 08:45

## 2023-12-14 RX ADMIN — HEPARIN SODIUM 5000 UNITS: 5000 INJECTION INTRAVENOUS; SUBCUTANEOUS at 06:32

## 2023-12-14 RX ADMIN — INSULIN LISPRO 16 UNITS: 100 INJECTION, SOLUTION INTRAVENOUS; SUBCUTANEOUS at 12:44

## 2023-12-14 RX ADMIN — FAMOTIDINE 40 MG: 20 TABLET ORAL at 06:32

## 2023-12-14 RX ADMIN — IPRATROPIUM BROMIDE AND ALBUTEROL SULFATE 3 ML: 2.5; .5 SOLUTION RESPIRATORY (INHALATION) at 08:30

## 2023-12-14 RX ADMIN — MORPHINE SULFATE 4 MG: 2 INJECTION, SOLUTION INTRAMUSCULAR; INTRAVENOUS at 16:57

## 2023-12-14 RX ADMIN — NIFEDIPINE 30 MG: 30 TABLET, FILM COATED, EXTENDED RELEASE ORAL at 08:51

## 2023-12-14 RX ADMIN — FINASTERIDE 5 MG: 5 TABLET, FILM COATED ORAL at 08:51

## 2023-12-14 RX ADMIN — HEPARIN 500 UNITS: 100 SYRINGE at 12:44

## 2023-12-14 RX ADMIN — METOPROLOL TARTRATE 100 MG: 50 TABLET, FILM COATED ORAL at 08:51

## 2023-12-14 RX ADMIN — ONDANSETRON 4 MG: 2 INJECTION INTRAMUSCULAR; INTRAVENOUS at 16:59

## 2023-12-14 RX ADMIN — INSULIN LISPRO 16 UNITS: 100 INJECTION, SOLUTION INTRAVENOUS; SUBCUTANEOUS at 08:51

## 2023-12-14 RX ADMIN — INSULIN LISPRO 2 UNITS: 100 INJECTION, SOLUTION INTRAVENOUS; SUBCUTANEOUS at 08:50

## 2023-12-14 RX ADMIN — INSULIN LISPRO 4 UNITS: 100 INJECTION, SOLUTION INTRAVENOUS; SUBCUTANEOUS at 12:43

## 2023-12-14 RX ADMIN — ZINC OXIDE 1 APPLICATION: 200 OINTMENT TOPICAL at 08:51

## 2023-12-14 RX ADMIN — HYDROCODONE BITARTRATE AND ACETAMINOPHEN 1 TABLET: 5; 325 TABLET ORAL at 20:59

## 2023-12-14 RX ADMIN — MONTELUKAST SODIUM 10 MG: 10 TABLET, FILM COATED ORAL at 08:51

## 2023-12-14 RX ADMIN — PIPERACILLIN SODIUM AND TAZOBACTAM SODIUM 4.5 G: 4; .5 INJECTION, SOLUTION INTRAVENOUS at 00:14

## 2023-12-14 RX ADMIN — SODIUM CHLORIDE 75 ML/HR: 9 INJECTION, SOLUTION INTRAVENOUS at 19:42

## 2023-12-14 RX ADMIN — PIPERACILLIN SODIUM AND TAZOBACTAM SODIUM 4.5 G: 4; .5 INJECTION, SOLUTION INTRAVENOUS at 08:50

## 2023-12-14 RX ADMIN — Medication 4 ML: at 08:32

## 2023-12-14 RX ADMIN — IPRATROPIUM BROMIDE AND ALBUTEROL SULFATE 3 ML: 2.5; .5 SOLUTION RESPIRATORY (INHALATION) at 11:24

## 2023-12-14 RX ADMIN — GABAPENTIN 300 MG: 300 CAPSULE ORAL at 06:32

## 2023-12-14 RX ADMIN — IPRATROPIUM BROMIDE AND ALBUTEROL SULFATE 3 ML: 2.5; .5 SOLUTION RESPIRATORY (INHALATION) at 03:29

## 2023-12-14 RX ADMIN — HYDROMORPHONE HYDROCHLORIDE 0.5 MG: 1 INJECTION, SOLUTION INTRAMUSCULAR; INTRAVENOUS; SUBCUTANEOUS at 19:08

## 2023-12-14 RX ADMIN — MUPIROCIN 1 APPLICATION: 20 OINTMENT TOPICAL at 08:52

## 2023-12-14 NOTE — ED NOTES
"Nursing report ED to floor  Preston Wallis  58 y.o.  male    HPI :   Chief Complaint   Patient presents with    Fall    Knee Pain     L knee       Admitting doctor:   Yohana Anaya MD    Admitting diagnosis:   The primary encounter diagnosis was Closed fracture of medial portion of left tibial plateau, initial encounter. Diagnoses of Chronic kidney disease, unspecified CKD stage, Chronic obstructive pulmonary disease, unspecified COPD type, Type 2 diabetes mellitus with other specified complication, unspecified whether long term insulin use, and Immobility were also pertinent to this visit.    Code status:   Current Code Status       Date Active Code Status Order ID Comments User Context       12/14/2023 1826 CPR (Attempt to Resuscitate) [id number]  Yohana Anaya MD ED        Question Answer    Code Status (Patient has no pulse and is not breathing) CPR (Attempt to Resuscitate)    Medical Interventions (Patient has pulse or is breathing) Full Support                    Allergies:   Benadryl [diphenhydramine] and Proventil [albuterol]    Isolation:   No active isolations    Intake and Output  No intake or output data in the 24 hours ending 12/14/23 1856    Weight:       12/14/23  1400   Weight: 126 kg (277 lb)       Most recent vitals:   Vitals:    12/14/23 1400 12/14/23 1441 12/14/23 1601 12/14/23 1701   BP:  171/84 179/88 170/100   BP Location:  Right arm     Pulse:  74 75 81   Resp:  18     Temp: 97.9 °F (36.6 °C)      SpO2:  100% 100% 100%   Weight: 126 kg (277 lb)      Height: 175.3 cm (69\")          Active LDAs/IV Access:   Lines, Drains & Airways       Active LDAs       Name Placement date Placement time Site Days    Peripheral IV 12/14/23 1657 Left Antecubital 12/14/23 1657  Antecubital  less than 1    Single Lumen Implantable Port Right Subclavian --  --  Subclavian  --                    Labs (abnormal labs have a star):   Labs Reviewed   COMPREHENSIVE METABOLIC PANEL - Abnormal; Notable for " the following components:       Result Value    Glucose 157 (*)     BUN 26 (*)     Creatinine 2.08 (*)     Alkaline Phosphatase 129 (*)     eGFR 36.2 (*)     All other components within normal limits    Narrative:     GFR Normal >60  Chronic Kidney Disease <60  Kidney Failure <15     CBC WITH AUTO DIFFERENTIAL - Abnormal; Notable for the following components:    WBC 12.37 (*)     RBC 3.35 (*)     Hemoglobin 9.6 (*)     Hematocrit 30.9 (*)     MCHC 31.1 (*)     Immature Grans % 0.6 (*)     Neutrophils, Absolute 8.04 (*)     Monocytes, Absolute 1.03 (*)     Eosinophils, Absolute 0.59 (*)     Immature Grans, Absolute 0.08 (*)     All other components within normal limits   POCT GLUCOSE FINGERSTICK - Normal   CBC AND DIFFERENTIAL    Narrative:     The following orders were created for panel order CBC & Differential.  Procedure                               Abnormality         Status                     ---------                               -----------         ------                     CBC Auto Differential[[id number]]        Abnormal            Final result                 Please view results for these tests on the individual orders.       EKG:   No orders to display       Meds given in ED:   Medications   sodium chloride 0.9 % flush 10 mL (has no administration in time range)   sodium chloride 0.9 % infusion (has no administration in time range)   acetaminophen (TYLENOL) tablet 650 mg (has no administration in time range)   HYDROcodone-acetaminophen (NORCO) 5-325 MG per tablet 1 tablet (has no administration in time range)   HYDROmorphone (DILAUDID) injection 0.5 mg (has no administration in time range)     And   naloxone (NARCAN) injection 0.4 mg (has no administration in time range)   sennosides-docusate (PERICOLACE) 8.6-50 MG per tablet 2 tablet (has no administration in time range)     And   polyethylene glycol (MIRALAX) packet 17 g (has no administration in time range)     And   bisacodyl (DULCOLAX) EC tablet 5  mg (has no administration in time range)     And   bisacodyl (DULCOLAX) suppository 10 mg (has no administration in time range)   ondansetron (ZOFRAN) tablet 4 mg (has no administration in time range)     Or   ondansetron (ZOFRAN) injection 4 mg (has no administration in time range)   melatonin tablet 3 mg (has no administration in time range)   morphine injection 4 mg (4 mg Intravenous Given 23 1657)   ondansetron (ZOFRAN) injection 4 mg (4 mg Intravenous Given 23 1659)       Imaging results:  XR Knee 1 or 2 View Left    Result Date: 2023   Proximal left tibia and fibula fractures. Degenerative, chronic, and postsurgical changes.    This report was finalized on 2023 3:32 PM by Dr. Linden Magallon M.D on Workstation: [employer]      XR Tibia Fibula 2 View Left    Result Date: 2023   Proximal left tibia and fibula fractures. Degenerative, chronic, and postsurgical changes.    This report was finalized on 2023 3:32 PM by Dr. Linden Magallon M.D on Workstation: [employer]       Ambulatory status:   - assist    Social issues:   Social History     Socioeconomic History    Marital status:    Tobacco Use    Smoking status: Former     Packs/day: 1.00     Years: 12.00     Additional pack years: 0.00     Total pack years: 12.00     Types: Cigarettes     Start date:      Quit date:      Years since quittin.9    Smokeless tobacco: Never   Vaping Use    Vaping Use: Never used   Substance and Sexual Activity    Alcohol use: Not Currently    Drug use: Never    Sexual activity: Defer       NIH Stroke Scale:       Solitario Teague RN  23 18:56 EST

## 2023-12-14 NOTE — DISCHARGE SUMMARY
Date of Admission: 12/6/2023  Date of Discharge:  12/14/2023  Primary Care Physician: Kimmy Riley MD     Discharge Diagnosis:  Active Hospital Problems    Diagnosis  POA    **Bacterial pneumonia [J15.9]  Yes    Bronchiectasis [J47.9]  Yes    Anemia [D64.9]  Yes    Sepsis [A41.9]  Yes    Essential hypertension [I10]  Yes    Chronic obstructive pulmonary disease [J44.9]  Yes    Stage 3b chronic kidney disease [N18.32]  Yes    Neurogenic bladder [N31.9]  Yes    Hyperlipidemia [E78.5]  Yes    Paroxysmal atrial fibrillation [I48.0]  Yes    Obstructive sleep apnea [G47.33]  Yes    Chronic diastolic heart failure [I50.32]  Yes    ERIC (acute kidney injury) [N17.9]  Yes      Resolved Hospital Problems   No resolved problems to display.       Presenting Problem/History of Present Illness from H&P:  Bacterial pneumonia [J15.9]     Mr. Wallis is a 58 y.o. former smoker with a history of COPD, chronic diastolic CHF, pseudomonas pneumonia, MRSA pneumonia, chronic respiratory failure with hypoxia, chronic bronchiectasis, type 2 diabetes mellitus, hypertension, hyperlipidemia, paroxysmal atrial fibrillation, and MILADY  that presents to [facility] complaining of shortness of breath.  He was transferred from [facility] following sputum cultures that grew achromobacter xylosoxidans.  He initially presented to [facility] on 11/28/2023 and a CT showed evidence of right lower lobe pneumonia.  A respiratory viral panel was negative and a MRSA swab was positive.  He was started on Vancomycin and Cefepime.  Once the sputum cultures resulted on 12/3/2023, he was started on Bactrim and Zosyn.  He showed evidence of worsening pneumonia after a few days and his oxygen requirements increased to 4L NC from his baseline of 2L NC. He also had persistent leukocytosis. The Bactrim was discontinued and he was started on Zyvox for MRSA pneumonia.  He was also thought to be having a bronchiectasis exacerbation.   His kidney function has also worsened and he was transferred to [facility] for evaluation by infectious disease and nephrology.     Hospital Course:  The patient is a 58 y.o. male who was transferred here after initial hospitalization and Flagyl day where he was treated for MRSA and Achromobacter pneumonia.  Patient also had abdominal wall cellulitis/abscess and had incision and drainage at [facility].  He subsequently had bleeding from that site so his Eliquis was held.  Upon arrival here he was evaluated by infectious disease, pulmonology, nephrology services.  He has had improvement in his respiratory status and will complete his antibiotic course today prior to discharge.  He is going to follow-up with pulmonology as an outpatient.    Patient has had hyperglycemia and also had frequent snacking while here.  His A1c is 8.2.  He has had increased insulin requirements here.  He is going to discharge on his home regimen but he did report that he has had elevated blood glucose fairly consistently at home.  Increasing his Lantus to 50 units.    Patient has paroxysmal atrial fibrillation and his anemia has stabilized.  He has tolerated prophylactic dose of heparin.  The I&D was over a week ago.  He can resume Eliquis at discharge and should follow-up with the surgeon from [facility].    Patient has neurogenic bladder and chronic intermittent catheterization at home.    His creatinine has been improving and is near baseline.  He has been off of Bumex while here and is not acutely volume overloaded.  Plan to have him follow-up with his home cardiologist to discuss when to resume Bumex and monitoring.    Exam Today:  Constitutional:       General: He is not in acute distress.     Appearance: He is obese. He is ill-appearing (chronically). He is not diaphoretic.   HENT:      Head: Atraumatic.   Eyes:      Conjunctiva/sclera: Conjunctivae normal.   Cardiovascular:      Rate and Rhythm: Normal rate and regular  rhythm.      Pulses: Normal pulses.   Pulmonary:      Effort: Pulmonary effort is normal.      Breath sounds: No wheezing or rales.   Abdominal:      General: There is no distension.      Palpations: Abdomen is soft.      Tenderness: There is no abdominal tenderness. There is no guarding or rebound.   Musculoskeletal:         General: Swelling present. No tenderness.   Skin:     General: Skin is warm and dry.   Neurological:      Mental Status: He is alert. Mental status is at baseline.   Psychiatric:         Mood and Affect: Mood normal.         Behavior: Behavior normal.     Results:  CXR  Unchanged right subclavian port with tip overlying the mid  SVC. Overlapping central venous tubing extending from the left  subclavian vein to the mid SVC, possibly abandoned. Patient is rotated.  Unchanged at least partial right upper lobe atelectasis and bilateral,  right greater than left parenchymal opacities. No pleural effusion or  pneumothorax.    CXR  Unchanged at least partial right upper lobe collapse with  right-sided volume loss and rightward mediastinal shift. Associated  widening of the right paratracheal stripe. Very similar bilateral right  greater than left parenchymal opacities. Right lower lobe opacity  silhouettes the shifted right heart border. Consider updated chest CT.  Right subclavian port with tip overlying the mid SVC. Overlapping left  subclavian central venous catheter tubing. No pleural effusion or  pneumothorax. Stable normal size rightward shifted cardiomediastinal  silhouette.    CT Chest  1. There is extensive cystic bronchiectasis at the perihilar regions of  all lobes, and the ectatic bronchi are thickened. There are  reticulonodular opacities scattered throughout the right lung and to a  lesser degree at the left lower lobe. Some of these opacities have  ill-defined margins and are suspected to represent acute bronchiolitis.  There is also ill-defined ground-glass density at the right lower  lobe  which is suspected to represent infectious infiltrate as well and the  appearance can be seen with atypical pneumonia.     2. There is bandlike atelectasis/scarring at the right lower lobe. There  are no pleural or pericardial effusions. There is no lymphadenopathy  within the chest. At the visualized upper abdomen, the stomach is nearly  filled with food debris and fluid.    Procedures Performed:         Consults:   Consults       Date and Time Order Name Status Description    12/7/2023  7:18 AM Inpatient Pulmonology Consult Completed     12/6/2023 10:17 PM Inpatient Pulmonology Consult      12/6/2023 10:17 PM Inpatient Nephrology Consult Completed     12/6/2023  8:36 PM Inpatient Infectious Diseases Consult Completed              Discharge Disposition:  Home or Self Care    Discharge Medications:     Discharge Medications        Changes to Medications        Instructions Start Date   apixaban 5 MG tablet tablet  Commonly known as: Eliquis  What changed: additional instructions   5 mg, Oral, Every 12 Hours      Lantus SoloStar 100 UNIT/ML injection pen  Generic drug: Insulin Glargine  What changed: See the new instructions.   50 Units, Subcutaneous, Daily      NIFEdipine XL 30 MG 24 hr tablet  Commonly known as: PROCARDIA XL  What changed: additional instructions   30 mg, Oral, Every Morning             Continue These Medications        Instructions Start Date   atorvastatin 20 MG tablet  Commonly known as: LIPITOR   20 mg, Oral, Nightly      B-D UF III MINI PEN NEEDLES 31G X 5 MM misc  Generic drug: Insulin Pen Needle   USE FOUR TIMES DAILY BEFORE MEALS AND AT BEDTIME      Dina Aerosphere 160-9-4.8 MCG/ACT aerosol inhaler  Generic drug: Budeson-Glycopyrrol-Formoterol   No dose, route, or frequency recorded.      Bydureon BCise 2 MG/0.85ML auto-injector injection  Generic drug: exenatide er   2 mg, Subcutaneous, Weekly      calcium citrate 950 (200 Ca) MG tablet  Commonly known as: CALCITRATE   950 mg,  Oral, Daily      docusate sodium 100 MG capsule  Commonly known as: COLACE   100 mg, Oral, Daily      DULoxetine 60 MG capsule  Commonly known as: CYMBALTA   60 mg, Oral, 2 Times Daily      famotidine 40 MG tablet  Commonly known as: PEPCID   40 mg, Oral, Every Morning      Farxiga 5 MG tablet tablet  Generic drug: dapagliflozin   5 mg, Oral, Daily      ferrous gluconate 324 MG tablet  Commonly known as: FERGON   324 mg, Oral, Daily With Breakfast      finasteride 5 MG tablet  Commonly known as: PROSCAR   5 mg, Oral, Daily      Fluticasone-Salmeterol 500-50 MCG/ACT DISKUS  Commonly known as: ADVAIR/WIXELA   1 puff, Inhalation, 2 Times Daily - RT      folic acid 1 MG tablet  Commonly known as: FOLVITE   1 mg, Oral, Daily      FreeStyle Bethany 2 [place] device   No dose, route, or frequency recorded.      FreeStyle Bethany 2 Sensor misc   USE every 14 DAYS      gabapentin 300 MG capsule  Commonly known as: NEURONTIN   TAKE ONE CAPSULE BY MOUTH EVERY MORNING AND TAKE TWO CAPSULES BY MOUTH EVERY NIGHT AT BEDTIME      glucose blood test strip   1 each, Other, Daily, Dx:   E11.40      Insulin Lispro (1 Unit Dial) 100 UNIT/ML solution pen-injector  Commonly known as: HUMALOG   inject EIGHT units UNDER THE SKIN INTO THE APPROPRIATE AREA THREE TIMES DAILY PER sliding scale, IF BLOOD SUGAR < 160= 0 units, 161-220= 2 units, 221-280= 4 units, 281-340 = SIX units, 341-400 = EIGHT units      magnesium oxide 400 tablet tablet  Commonly known as: MAG-OX   400 mg, Oral, Daily, Started by Flaget       metoprolol tartrate 100 MG tablet  Commonly known as: LOPRESSOR   100 mg, Oral, 2 Times Daily      montelukast 10 MG tablet  Commonly known as: SINGULAIR   10 mg, Oral, Every Night at Bedtime      O2  Commonly known as: OXYGEN   2 Liter O2 - CONTINUOUS (route: Oxygen)      ondansetron ODT 4 MG disintegrating tablet  Commonly known as: ZOFRAN-ODT   PLACE 1 TABLET ON THE TONGUE EVERY 8 HOURS AS NEEDED FOR NAUSEA AND VOMITING      Ozempic (1  MG/DOSE) 4 MG/3ML solution pen-injector  Generic drug: Semaglutide (1 MG/DOSE)   1 mg, Subcutaneous, Weekly      TRUEplus Lancets 28G misc   USE AS DIRECTED      Ventolin  (90 Base) MCG/ACT inhaler  Generic drug: albuterol sulfate HFA   INHALE 2 PUFFS FOUR TIMES DAILY AS NEEDED FOR WHEEZING      Vitamin D3 50 MCG (2000 UT) tablet   50 mcg, Oral, Daily             Stop These Medications      bumetanide 2 MG tablet  Commonly known as: BUMEX     losartan 25 MG tablet  Commonly known as: COZAAR              Discharge Diet:   Diet Instructions       Diet: Cardiac Diets, Diabetic Diets; Healthy Heart (2-3 Na+); Thin (IDDSI 0); Consistent Carbohydrate      Discharge Diet:  Cardiac Diets  Diabetic Diets       Cardiac Diet: Healthy Heart (2-3 Na+)    Fluid Consistency: Thin (IDDSI 0)    Diabetic Diet: Consistent Carbohydrate            Activity at Discharge:   Activity Instructions       Activity as Tolerated              Follow-up Appointments:  Additional Instructions for the Follow-ups that You Need to Schedule       Discharge Follow-up with Specialty: home cardiologist   As directed      Specialty: home cardiologist   Follow Up Details: call to discuss when/if to resume bumex and monitoring with them               Contact information for follow-up providers       Kimmy Riley MD .    Specialty: Family Medicine  Contact information:  [address]  [address]  [facility] [address]  [phone]               Kelly Mccarthy MD Follow up.    Specialty: Nephrology  Contact information:  [address]  [address]  [facility] [address]  [phone]                       Contact information for after-discharge care       Home Medical Care       [facility] .    Service: Home Rehabilitation  Contact information:  [address] [facility], [address]  [facility] [address] [phone]                                   Test Results Pending at Discharge:       Deshawn Moser,  MD  12/14/23  11:56 EST    Time Spent on Discharge Activities: >30 minutes    Dictated portions using Dragon dictation software.

## 2023-12-14 NOTE — ED NOTES
Pt was getting into the car after being discharged. Pt was stepping off the curb to get closer tot he car and stated his knee gave out and he landed on the L knee.     Pt stated he has had surgery on the L knee before and there is a judith in the knee.     Pt denies hitting his head but states he is on a blood thinner but cannot recall what the blood thinner is.     PT alert and oriented at time of triage and assessment.

## 2023-12-14 NOTE — ED PROVIDER NOTES
EMERGENCY DEPARTMENT ENCOUNTER    Room Number:  13/13  Date of encounter:  12/15/2023  PCP: Kimmy Riley MD  Historian: Patient, family  Chronic or social conditions impacting care (social determinants of health): Full code from home    HPI:  Chief Complaint: Fall  A complete HPI/ROS/PMH/PSH/SH/FH are unobtainable due to: Nothing    Context: Preston Wallis is a 58 y.o. male who presents to the ED c/o injuries sustained in a mechanical fall prior to arrival.  Patient was actually just discharged from the hospital this afternoon and was attempting to get in his car in the parking lot.  Patient reports his left knee gave out causing him to fall.  He denies any other injuries from the fall.  Specifically denies any head, neck, trunk injuries.  Patient had a lengthy admission for pneumonia, COPD.  Patient reports that he was able to stand and walk to some degree.  He is chronically ill-appearing and fairly immobile at baseline.  He is on Eliquis for history of A-fib.    Review of prior external notes (non-ED):   I reviewed admission from 12/6/2023 through 12/14/2023.    Review of prior external test results outside of this encounter:  I reviewed a BMP from earlier today.  Creatinine 1.94, potassium 4.1    PAST MEDICAL HISTORY  Active Ambulatory Problems     Diagnosis Date Noted    Polyneuropathy     Paroxysmal atrial fibrillation     Obstructive sleep apnea     MRSA pneumonia     Low back pain     Chronic diastolic heart failure 02/01/2022    Allergies     COPD exacerbation 08/25/2020    Chronic anticoagulation 04/06/2022    Benign prostatic hyperplasia 06/15/2022    Impaired mobility and endurance 06/15/2022    Stage 3a chronic kidney disease 05/12/2022    Iron deficiency anemia secondary to inadequate dietary iron intake 06/21/2022    Vitamin D deficiency 06/21/2022    Class 3 severe obesity with serious comorbidity in adult 06/21/2022    Lower extremity edema 06/21/2022    Elevated alkaline phosphatase level  06/21/2022    Venous insufficiency (chronic) (peripheral) 07/21/2022    Tobacco abuse, in remission 08/25/2022    History of Pseudomonas pneumonia 08/25/2022    Chronic dyspnea 08/25/2022    Gastroesophageal reflux disease 08/25/2022    Bronchiectasis without complication 08/25/2022    ERIC (acute kidney injury) 01/12/2022    Altered mental status 08/25/2020    Hyperlipidemia 05/12/2022    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    Neurogenic bladder 10/28/2022    Class 1 obesity 04/06/2022    Pneumonia due to Pseudomonas species 01/13/2023    Seizures 08/24/2020    Primary osteoarthritis of left knee 02/07/2023    Other constipation 02/07/2023    Chronic obstructive pulmonary disease 02/07/2023    Type 2 DM with CKD stage 3 and hypertension 02/07/2023    Essential hypertension 02/07/2023    Stage 3b chronic kidney disease 02/07/2023    Annual physical exam 02/07/2023    Long-term use of high-risk medication 02/07/2023    Personal history of PE (pulmonary embolism) 02/09/2023    Encounter for aftercare for healing closed traumatic fracture of left femur 04/07/2023    Chronic pain of left knee 04/07/2023    Primary osteoarthritis of right knee 04/07/2023    Sepsis 12/06/2023    Bronchiectasis 12/07/2023    Bacterial pneumonia 12/07/2023    Anemia 12/07/2023     Resolved Ambulatory Problems     Diagnosis Date Noted    COPD with exacerbation     Chronic cough 10/19/2021    Acute hypoxemic respiratory failure due to COVID-19 10/19/2021    Type 1 diabetes mellitus with diabetic chronic kidney disease     Multifocal pneumonia     Polyneuropathy 05/12/2022    Insomnia     Hypertension 01/12/2022    Acute on chronic respiratory failure with hypoxia 04/05/2022    Acute respiratory failure with hypercapnia 04/07/2022    Type 2 diabetes mellitus with hyperglycemia 04/07/2022    Therapeutic drug monitoring 04/07/2022    Cytokine release syndrome, grade 3 04/11/2022    Elevated troponin 04/11/2022    Doyle catheter in place 06/15/2022     Microscopic hematuria 06/15/2022    Wheezing 08/25/2022    Type 2 diabetes mellitus without complication 08/25/2020    Diabetes 09/01/2022    Abrasion 01/13/2023    Anemia 05/12/2022    Ankle fracture 01/13/2023    Asthma 01/13/2023    Charcot foot due to diabetes mellitus 09/10/2013    Chronic kidney disease 01/13/2023    Closed supracondylar fracture of femur 01/12/2022    Congestive heart failure 05/12/2022    Deep vein thrombosis (DVT) of lower extremity associated with air travel 01/13/2023    Diabetic neuropathy 09/10/2013    Edema 01/13/2023    Ex-smoker 01/13/2023    Fall 01/12/2022    Fracture of proximal humerus 01/13/2023    Hypertensive heart disease without congestive heart failure 01/13/2023    Hypokalemia 01/13/2023    Infected stasis ulcer of left lower extremity 01/13/2023    Proteinuria 10/28/2022    Tinnitus 01/13/2023     Past Medical History:   Diagnosis Date    Age-related cognitive decline     Allergic contact dermatitis     Bronchiectasis with acute lower respiratory infection     Chronic diastolic (congestive) heart failure     Chronic respiratory failure with hypoxia     COPD (chronic obstructive pulmonary disease)     Dependence on supplemental oxygen     Eczema     Erectile dysfunction     Essential (primary) hypertension     Fracture     GERD without esophagitis     High risk medication use     Hypercholesteremia     Hypomagnesemia     Major depressive disorder     Morbid (severe) obesity due to excess calories     Muscle weakness     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 without evidence of necrosis     Obstructive sleep apnea (adult) (pediatric)     Other forms of dyspnea     Other long term (current) drug therapy     Other specified noninfective gastroenteritis and colitis     Other spondylosis, lumbar region     Pain in both knees     Peripheral neuropathy     Pneumonia, unspecified organism     Rash and other nonspecific skin eruption      Syncope and collapse     Tachycardia     Type 2 diabetes mellitus     Unspecified fall, initial encounter     Urinary retention          PAST SURGICAL HISTORY  Past Surgical History:   Procedure Laterality Date    CHOLECYSTECTOMY      CYSTOSCOPY      FEMUR SURGERY Left     Shravan placed    KNEE SURGERY Left     OTHER SURGICAL HISTORY Left     venous port    TONSILLECTOMY AND ADENOIDECTOMY           FAMILY HISTORY  Family History   Problem Relation Age of Onset    Coronary artery disease Mother     Hypertension Mother     Diabetes type II Mother     Asthma Father     Diabetes type II Sister     Cancer Sister          SOCIAL HISTORY  Social History     Socioeconomic History    Marital status:    Tobacco Use    Smoking status: Former     Packs/day: 1.00     Years: 12.00     Additional pack years: 0.00     Total pack years: 12.00     Types: Cigarettes     Start date:      Quit date:      Years since quittin.9    Smokeless tobacco: Never   Vaping Use    Vaping Use: Never used   Substance and Sexual Activity    Alcohol use: Not Currently    Drug use: Never    Sexual activity: Defer         ALLERGIES  Benadryl [diphenhydramine] and Proventil [albuterol]        REVIEW OF SYSTEMS  All systems reviewed and negative except for those discussed in HPI.       PHYSICAL EXAM    I have reviewed the triage vital signs and nursing notes.    ED Triage Vitals   Temp Heart Rate Resp BP SpO2   23 1400 23 1441 23 1441 23 1441 23 1441   97.9 °F (36.6 °C) 74 18 171/84 100 %      Temp src Heart Rate Source Patient Position BP Location FiO2 (%)   -- -- -- 23 1441 --      Right arm        Physical Exam  GENERAL: Alert, oriented, chronically ill-appearing, not distressed  HENT: head atraumatic, no nuchal rigidity  EYES: no scleral icterus, EOMI  CV: regular rhythm, regular rate, no murmur  RESPIRATORY: normal effort, CTA  ABDOMEN: soft, nontender  MUSCULOSKELETAL: Moderate tenderness, mild  pain, deformity noted at the proximal tibia.  Pulses intact distally, chronic numbness of bilateral lower extremities.  NEURO: alert, moves all extremities, follows commands.  Chronic numbness to bilateral lower extremities secondary to neuropathy  SKIN: warm, dry        LAB RESULTS  Recent Results (from the past 24 hour(s))   POC Glucose Once    Collection Time: 12/14/23  6:35 AM    Specimen: Blood   Result Value Ref Range    Glucose 199 (H) 70 - 130 mg/dL   Renal Function Panel    Collection Time: 12/14/23  6:43 AM    Specimen: Blood   Result Value Ref Range    Glucose 184 (H) 65 - 99 mg/dL    BUN 26 (H) 6 - 20 mg/dL    Creatinine 1.94 (H) 0.76 - 1.27 mg/dL    Sodium 137 136 - 145 mmol/L    Potassium 4.1 3.5 - 5.2 mmol/L    Chloride 101 98 - 107 mmol/L    CO2 27.1 22.0 - 29.0 mmol/L    Calcium 9.2 8.6 - 10.5 mg/dL    Albumin 3.3 (L) 3.5 - 5.2 g/dL    Phosphorus 3.8 2.5 - 4.5 mg/dL    Anion Gap 8.9 5.0 - 15.0 mmol/L    BUN/Creatinine Ratio 13.4 7.0 - 25.0    eGFR 39.4 (L) >60.0 mL/min/1.73   CBC (No Diff)    Collection Time: 12/14/23  6:43 AM    Specimen: Blood   Result Value Ref Range    WBC 13.47 (H) 3.40 - 10.80 10*3/mm3    RBC 3.00 (L) 4.14 - 5.80 10*6/mm3    Hemoglobin 8.5 (L) 13.0 - 17.7 g/dL    Hematocrit 27.0 (L) 37.5 - 51.0 %    MCV 90.0 79.0 - 97.0 fL    MCH 28.3 26.6 - 33.0 pg    MCHC 31.5 31.5 - 35.7 g/dL    RDW 13.1 12.3 - 15.4 %    RDW-SD 42.9 37.0 - 54.0 fl    MPV 8.4 6.0 - 12.0 fL    Platelets 408 140 - 450 10*3/mm3   Magnesium    Collection Time: 12/14/23  6:43 AM    Specimen: Blood   Result Value Ref Range    Magnesium 1.9 1.6 - 2.6 mg/dL   POC Glucose Once    Collection Time: 12/14/23 11:21 AM    Specimen: Blood   Result Value Ref Range    Glucose 247 (H) 70 - 130 mg/dL   Comprehensive Metabolic Panel    Collection Time: 12/14/23  3:56 PM    Specimen: Blood   Result Value Ref Range    Glucose 157 (H) 65 - 99 mg/dL    BUN 26 (H) 6 - 20 mg/dL    Creatinine 2.08 (H) 0.76 - 1.27 mg/dL    Sodium 137  136 - 145 mmol/L    Potassium 4.5 3.5 - 5.2 mmol/L    Chloride 100 98 - 107 mmol/L    CO2 25.6 22.0 - 29.0 mmol/L    Calcium 9.2 8.6 - 10.5 mg/dL    Total Protein 7.7 6.0 - 8.5 g/dL    Albumin 3.6 3.5 - 5.2 g/dL    ALT (SGPT) 26 1 - 41 U/L    AST (SGOT) 17 1 - 40 U/L    Alkaline Phosphatase 129 (H) 39 - 117 U/L    Total Bilirubin <0.2 0.0 - 1.2 mg/dL    Globulin 4.1 gm/dL    A/G Ratio 0.9 g/dL    BUN/Creatinine Ratio 12.5 7.0 - 25.0    Anion Gap 11.4 5.0 - 15.0 mmol/L    eGFR 36.2 (L) >60.0 mL/min/1.73   CBC Auto Differential    Collection Time: 12/14/23  3:56 PM    Specimen: Blood   Result Value Ref Range    WBC 12.37 (H) 3.40 - 10.80 10*3/mm3    RBC 3.35 (L) 4.14 - 5.80 10*6/mm3    Hemoglobin 9.6 (L) 13.0 - 17.7 g/dL    Hematocrit 30.9 (L) 37.5 - 51.0 %    MCV 92.2 79.0 - 97.0 fL    MCH 28.7 26.6 - 33.0 pg    MCHC 31.1 (L) 31.5 - 35.7 g/dL    RDW 13.5 12.3 - 15.4 %    RDW-SD 45.8 37.0 - 54.0 fl    MPV 8.5 6.0 - 12.0 fL    Platelets 414 140 - 450 10*3/mm3    Neutrophil % 65.1 42.7 - 76.0 %    Lymphocyte % 20.5 19.6 - 45.3 %    Monocyte % 8.3 5.0 - 12.0 %    Eosinophil % 4.8 0.3 - 6.2 %    Basophil % 0.7 0.0 - 1.5 %    Immature Grans % 0.6 (H) 0.0 - 0.5 %    Neutrophils, Absolute 8.04 (H) 1.70 - 7.00 10*3/mm3    Lymphocytes, Absolute 2.54 0.70 - 3.10 10*3/mm3    Monocytes, Absolute 1.03 (H) 0.10 - 0.90 10*3/mm3    Eosinophils, Absolute 0.59 (H) 0.00 - 0.40 10*3/mm3    Basophils, Absolute 0.09 0.00 - 0.20 10*3/mm3    Immature Grans, Absolute 0.08 (H) 0.00 - 0.05 10*3/mm3    nRBC 0.1 0.0 - 0.2 /100 WBC   POC Glucose Once    Collection Time: 12/14/23  4:53 PM    Specimen: Blood   Result Value Ref Range    Glucose 125 70 - 130 mg/dL   POC Glucose Once    Collection Time: 12/14/23 11:01 PM    Specimen: Blood   Result Value Ref Range    Glucose 75 70 - 130 mg/dL       Ordered the above labs and independently reviewed the results.        RADIOLOGY  XR Knee 1 or 2 View Left, XR Tibia Fibula 2 View Left    Result Date:  12/14/2023  XR KNEE 1 OR 2 VW LEFT-, XR TIBIA FIBULA 2 VW LEFT-  INDICATIONS: Trauma.  TECHNIQUE: FRONTAL AND LATERAL VIEWS OF THE left knee, 3 views of the left lower leg  COMPARISON: 3/23/2023  FINDINGS:  Right and screw surgical hardware of the distal left femur is partly included, appears similar to prior exam, with appearance of protrusion of the judith into the intercondylar groove. Old left femur deformity is partly included.  New, mildly impacted fractures are seen at the level of the proximal tibial metadiaphysis and proximal fibular metaphysis, with surrounding soft tissue swelling. No other fractures are noted. No dislocation. Degenerative changes are seen at the knee with prominent medial tibiofemoral joint space narrowing.       Proximal left tibia and fibula fractures. Degenerative, chronic, and postsurgical changes.    This report was finalized on 12/14/2023 3:32 PM by Dr. Linden Magallon M.D on Workstation: [id number]       I ordered the above noted radiological studies. Reviewed by me and discussed with radiologist.  See dictation for official radiology interpretation.      MEDICATIONS GIVEN IN ER    Medications   sodium chloride 0.9 % flush 10 mL (has no administration in time range)   sodium chloride 0.9 % infusion (75 mL/hr Intravenous New Bag 12/14/23 1942)   acetaminophen (TYLENOL) tablet 650 mg (has no administration in time range)   HYDROcodone-acetaminophen (NORCO) 5-325 MG per tablet 1 tablet (1 tablet Oral Given 12/14/23 2059)   HYDROmorphone (DILAUDID) injection 0.5 mg (0.5 mg Intravenous Given 12/14/23 1908)     And   naloxone (NARCAN) injection 0.4 mg (has no administration in time range)   sennosides-docusate (PERICOLACE) 8.6-50 MG per tablet 2 tablet (has no administration in time range)     And   polyethylene glycol (MIRALAX) packet 17 g (has no administration in time range)     And   bisacodyl (DULCOLAX) EC tablet 5 mg (has no administration in time range)     And   bisacodyl  (DULCOLAX) suppository 10 mg (has no administration in time range)   ondansetron (ZOFRAN) tablet 4 mg (has no administration in time range)     Or   ondansetron (ZOFRAN) injection 4 mg (has no administration in time range)   melatonin tablet 3 mg (has no administration in time range)   sodium chloride 0.9 % flush 10 mL (has no administration in time range)   sodium chloride 0.9 % flush 10 mL (has no administration in time range)   sodium chloride 0.9 % flush 10 mL (has no administration in time range)   sodium chloride 0.9 % flush 20 mL (has no administration in time range)   sodium chloride 0.9 % infusion 40 mL (has no administration in time range)   heparin injection 500 Units (has no administration in time range)   dextrose (GLUTOSE) oral gel 15 g (has no administration in time range)   dextrose (D50W) (25 g/50 mL) IV injection 25 g (has no administration in time range)   glucagon (GLUCAGEN) injection 1 mg (has no administration in time range)   insulin lispro (HUMALOG/ADMELOG) injection 2-7 Units ( Subcutaneous Not Given 12/14/23 2304)   HYDROcodone-acetaminophen (NORCO) 7.5-325 MG per tablet 1 tablet (has no administration in time range)   morphine injection 4 mg (4 mg Intravenous Given 12/14/23 1657)   ondansetron (ZOFRAN) injection 4 mg (4 mg Intravenous Given 12/14/23 1659)         ADDITIONAL ORDERS CONSIDERED BUT NOT ORDERED:  Nothing      PROGRESS, DATA ANALYSIS, CONSULTS, AND MEDICAL DECISION MAKING    All labs have been independently interpreted by myself.  All radiology studies have been independently interpreted by myself and discussed with radiologist dictating the report.   EKG's independently interpreted by myself.  Discussion below represents my analysis of pertinent findings related to patient's condition, differential diagnosis, treatment plan and final disposition.    I have discussed case with Dr. Alejandra, emergency room physician.  He has performed his own bedside examination and agrees with  treatment plan.    ED Course as of 12/15/23 0026   u Dec 14, 2023   1504 Patient presents after fall while leaving the hospital.  Patient has deformity to the left knee.  Patient is obese and was just discharged after 8 days in hospital.  He will likely need to be admitted.  No other injuries. [EE]   1529 Left knee images show a mildly displaced proximal tibial fracture with possible intra-articular extension. [EE]   1705 Updated patient and family on workup.  Patient is extremely immobile and chronically ill.  The patient's family is unable to care for the patient.  Given that he was just discharged we will admit him for further evaluation, Ortho evaluation, likely placement. [EE]   1714 Creatinine(!): 2.08 [EE]   1714 Hemoglobin(!): 9.6 [EE]   1755 I discussed case with Dr. Anaya, [facility].  She agrees to admit. [EE]   1837 I discussed the patient with Dr. Kline, orthopedics.  He agrees to consult.  He requests a CT of the knee. [EE]      ED Course User Index  [EE] Kevon Love PA       AS OF 00:26 EST VITALS:    BP - 150/88  HR - 93  TEMP - 97.9 °F (36.6 °C)  O2 SATS - 100%        DIAGNOSIS  Final diagnoses:   Closed fracture of medial portion of left tibial plateau, initial encounter   Chronic kidney disease, unspecified CKD stage   Chronic obstructive pulmonary disease, unspecified COPD type   Type 2 diabetes mellitus with other specified complication, unspecified whether long term insulin use   Immobility         DISPOSITION  Admitted      Dictated utilizing Dragon dictation     Kevon Love PA  12/15/23 0026

## 2023-12-14 NOTE — ED PROVIDER NOTES
MD ATTESTATION NOTE    The AL and I have discussed this patient's history, physical exam, and treatment plan.  I have reviewed the documentation and personally had a face to face interaction with the patient. I affirm the documentation and agree with the treatment and plan.  The attached note describes my personal findings.      I provided a substantive portion of the care of the patient.  I personally performed the physical exam in its entirety, and below are my findings.        Brief HPI: 58-year-old male presenting for evaluation of knee injury.  Patient sustained a mechanical fall after being discharged from the hospital today while he was attempting to get in the car.  Patient fell down and landed on his knee but denies hitting his head.    PHYSICAL EXAM  ED Triage Vitals   Temp Heart Rate Resp BP SpO2   12/14/23 1400 12/14/23 1441 12/14/23 1441 12/14/23 1441 12/14/23 1441   97.9 °F (36.6 °C) 74 18 171/84 100 %      Temp src Heart Rate Source Patient Position BP Location FiO2 (%)   -- -- -- 12/14/23 1441 --      Right arm          GENERAL: no acute distress  HENT: nares patent  EYES: no scleral icterus  CV: regular rhythm, normal rate  RESPIRATORY: normal effort  ABDOMEN: soft  MUSCULOSKELETAL:  Tenderness to palpation in the anterior infrapatellar area of the left knee.  Range of motion limited  NEURO: alert, moves all extremities, follows commands  PSYCH:  calm, cooperative  SKIN: warm, dry    Vital signs and nursing notes reviewed.        Plan: 58-year-old male presenting for evaluation of knee pain following mechanical fall.  Imaging shows tibial plateau fracture.  Patient has baseline difficulties with ambulation which will be significantly more difficult given new fracture.  We will admit patient for further evaluation and management with orthopedic consult.       Pawan Alejandra MD  12/14/23 0970

## 2023-12-14 NOTE — PLAN OF CARE
Goal Outcome Evaluation:  Plan of Care Reviewed With: patient, spouse        Progress: improving  Outcome Evaluation: Patient discharged to home.

## 2023-12-14 NOTE — OUTREACH NOTE
Prep Survey      Flowsheet Row Responses   Denominational facility patient discharged from? [place]   Is LACE score < 7 ? No   Eligibility [facility]   Date of Admission 12/06/23   Date of Discharge 12/14/23   Discharge Disposition Home-Health Care Svc   Discharge diagnosis bacterial PNA   Does the patient have one of the following disease processes/diagnoses(primary or secondary)? Pneumonia   Does the patient have Home health ordered? Yes   What is the Home health agency?  [facility]   Is there a DME ordered? No   Prep survey completed? Yes            Romelia RODARTE - Registered Nurse

## 2023-12-15 ENCOUNTER — APPOINTMENT (OUTPATIENT)
Dept: GENERAL RADIOLOGY | Facility: HOSPITAL | Age: 58
DRG: 853 | End: 2023-12-15
Payer: COMMERCIAL

## 2023-12-15 ENCOUNTER — APPOINTMENT (OUTPATIENT)
Dept: CT IMAGING | Facility: HOSPITAL | Age: 58
DRG: 853 | End: 2023-12-15
Payer: COMMERCIAL

## 2023-12-15 LAB
ANION GAP SERPL CALCULATED.3IONS-SCNC: 10 MMOL/L (ref 5–15)
BUN SERPL-MCNC: 28 MG/DL (ref 6–20)
BUN/CREAT SERPL: 14.4 (ref 7–25)
CALCIUM SPEC-SCNC: 8.9 MG/DL (ref 8.6–10.5)
CHLORIDE SERPL-SCNC: 105 MMOL/L (ref 98–107)
CO2 SERPL-SCNC: 25 MMOL/L (ref 22–29)
CREAT SERPL-MCNC: 1.95 MG/DL (ref 0.76–1.27)
DEPRECATED RDW RBC AUTO: 39.9 FL (ref 37–54)
EGFRCR SERPLBLD CKD-EPI 2021: 39.1 ML/MIN/1.73
ERYTHROCYTE [DISTWIDTH] IN BLOOD BY AUTOMATED COUNT: 12.7 % (ref 12.3–15.4)
GLUCOSE BLDC GLUCOMTR-MCNC: 158 MG/DL (ref 70–130)
GLUCOSE BLDC GLUCOMTR-MCNC: 160 MG/DL (ref 70–130)
GLUCOSE BLDC GLUCOMTR-MCNC: 167 MG/DL (ref 70–130)
GLUCOSE BLDC GLUCOMTR-MCNC: 177 MG/DL (ref 70–130)
GLUCOSE BLDC GLUCOMTR-MCNC: 208 MG/DL (ref 70–130)
GLUCOSE SERPL-MCNC: 145 MG/DL (ref 65–99)
HCT VFR BLD AUTO: 25.1 % (ref 37.5–51)
HGB BLD-MCNC: 7.8 G/DL (ref 13–17.7)
MCH RBC QN AUTO: 27 PG (ref 26.6–33)
MCHC RBC AUTO-ENTMCNC: 31.1 G/DL (ref 31.5–35.7)
MCV RBC AUTO: 86.9 FL (ref 79–97)
PLATELET # BLD AUTO: 365 10*3/MM3 (ref 140–450)
PMV BLD AUTO: 8.4 FL (ref 6–12)
POTASSIUM SERPL-SCNC: 4.1 MMOL/L (ref 3.5–5.2)
RBC # BLD AUTO: 2.89 10*6/MM3 (ref 4.14–5.8)
SODIUM SERPL-SCNC: 140 MMOL/L (ref 136–145)
WBC NRBC COR # BLD AUTO: 13.25 10*3/MM3 (ref 3.4–10.8)

## 2023-12-15 PROCEDURE — 63710000001 INSULIN LISPRO (HUMAN) PER 5 UNITS: Performed by: INTERNAL MEDICINE

## 2023-12-15 PROCEDURE — 63710000001 INSULIN GLARGINE PER 5 UNITS: Performed by: ORTHOPAEDIC SURGERY

## 2023-12-15 PROCEDURE — 73590 X-RAY EXAM OF LOWER LEG: CPT

## 2023-12-15 PROCEDURE — 94799 UNLISTED PULMONARY SVC/PX: CPT

## 2023-12-15 PROCEDURE — 94761 N-INVAS EAR/PLS OXIMETRY MLT: CPT

## 2023-12-15 PROCEDURE — 63710000001 INSULIN LISPRO (HUMAN) PER 5 UNITS: Performed by: ORTHOPAEDIC SURGERY

## 2023-12-15 PROCEDURE — 94760 N-INVAS EAR/PLS OXIMETRY 1: CPT

## 2023-12-15 PROCEDURE — 85027 COMPLETE CBC AUTOMATED: CPT | Performed by: INTERNAL MEDICINE

## 2023-12-15 PROCEDURE — G0378 HOSPITAL OBSERVATION PER HR: HCPCS

## 2023-12-15 PROCEDURE — 82948 REAGENT STRIP/BLOOD GLUCOSE: CPT

## 2023-12-15 PROCEDURE — 25810000003 SODIUM CHLORIDE 0.9 % SOLUTION: Performed by: INTERNAL MEDICINE

## 2023-12-15 PROCEDURE — 25010000002 HYDROMORPHONE PER 4 MG: Performed by: INTERNAL MEDICINE

## 2023-12-15 PROCEDURE — 80048 BASIC METABOLIC PNL TOTAL CA: CPT | Performed by: INTERNAL MEDICINE

## 2023-12-15 PROCEDURE — 63710000001 INSULIN GLARGINE PER 5 UNITS: Performed by: INTERNAL MEDICINE

## 2023-12-15 PROCEDURE — 36415 COLL VENOUS BLD VENIPUNCTURE: CPT | Performed by: INTERNAL MEDICINE

## 2023-12-15 PROCEDURE — 94664 DEMO&/EVAL PT USE INHALER: CPT

## 2023-12-15 PROCEDURE — 73700 CT LOWER EXTREMITY W/O DYE: CPT

## 2023-12-15 RX ORDER — ATORVASTATIN CALCIUM 20 MG/1
20 TABLET, FILM COATED ORAL NIGHTLY
Status: DISCONTINUED | OUTPATIENT
Start: 2023-12-15 | End: 2023-12-28 | Stop reason: HOSPADM

## 2023-12-15 RX ORDER — INSULIN LISPRO 100 [IU]/ML
8 INJECTION, SOLUTION INTRAVENOUS; SUBCUTANEOUS
Status: DISCONTINUED | OUTPATIENT
Start: 2023-12-15 | End: 2023-12-28 | Stop reason: HOSPADM

## 2023-12-15 RX ORDER — ALBUTEROL SULFATE 0.63 MG/3ML
0.63 SOLUTION RESPIRATORY (INHALATION) EVERY 6 HOURS PRN
Status: DISCONTINUED | OUTPATIENT
Start: 2023-12-15 | End: 2023-12-28 | Stop reason: HOSPADM

## 2023-12-15 RX ORDER — FAMOTIDINE 20 MG/1
40 TABLET, FILM COATED ORAL EVERY MORNING
Status: DISCONTINUED | OUTPATIENT
Start: 2023-12-15 | End: 2023-12-28 | Stop reason: HOSPADM

## 2023-12-15 RX ORDER — MONTELUKAST SODIUM 10 MG/1
10 TABLET ORAL NIGHTLY
Status: DISCONTINUED | OUTPATIENT
Start: 2023-12-15 | End: 2023-12-28 | Stop reason: HOSPADM

## 2023-12-15 RX ORDER — FERROUS SULFATE 325(65) MG
325 TABLET ORAL
Status: DISCONTINUED | OUTPATIENT
Start: 2023-12-15 | End: 2023-12-17

## 2023-12-15 RX ORDER — MELATONIN
2000 DAILY
Status: DISCONTINUED | OUTPATIENT
Start: 2023-12-15 | End: 2023-12-28 | Stop reason: HOSPADM

## 2023-12-15 RX ORDER — CALCIUM CARBONATE 500(1250)
500 TABLET ORAL DAILY
Status: DISCONTINUED | OUTPATIENT
Start: 2023-12-15 | End: 2023-12-28 | Stop reason: HOSPADM

## 2023-12-15 RX ORDER — GABAPENTIN 300 MG/1
300 CAPSULE ORAL EVERY 12 HOURS SCHEDULED
Status: DISCONTINUED | OUTPATIENT
Start: 2023-12-15 | End: 2023-12-28 | Stop reason: HOSPADM

## 2023-12-15 RX ORDER — FOLIC ACID 1 MG/1
1 TABLET ORAL DAILY
Status: DISCONTINUED | OUTPATIENT
Start: 2023-12-15 | End: 2023-12-28 | Stop reason: HOSPADM

## 2023-12-15 RX ORDER — DULOXETIN HYDROCHLORIDE 60 MG/1
60 CAPSULE, DELAYED RELEASE ORAL 2 TIMES DAILY
Status: DISCONTINUED | OUTPATIENT
Start: 2023-12-15 | End: 2023-12-28 | Stop reason: HOSPADM

## 2023-12-15 RX ORDER — METOPROLOL TARTRATE 50 MG/1
100 TABLET, FILM COATED ORAL 2 TIMES DAILY
Status: DISCONTINUED | OUTPATIENT
Start: 2023-12-15 | End: 2023-12-26

## 2023-12-15 RX ORDER — FINASTERIDE 5 MG/1
5 TABLET, FILM COATED ORAL DAILY
Status: DISCONTINUED | OUTPATIENT
Start: 2023-12-15 | End: 2023-12-28 | Stop reason: HOSPADM

## 2023-12-15 RX ORDER — BUDESONIDE AND FORMOTEROL FUMARATE DIHYDRATE 160; 4.5 UG/1; UG/1
2 AEROSOL RESPIRATORY (INHALATION)
Status: DISCONTINUED | OUTPATIENT
Start: 2023-12-15 | End: 2023-12-28 | Stop reason: HOSPADM

## 2023-12-15 RX ORDER — NIFEDIPINE 30 MG/1
30 TABLET, EXTENDED RELEASE ORAL EVERY MORNING
Status: DISCONTINUED | OUTPATIENT
Start: 2023-12-15 | End: 2023-12-28 | Stop reason: HOSPADM

## 2023-12-15 RX ADMIN — FINASTERIDE 5 MG: 5 TABLET, FILM COATED ORAL at 09:18

## 2023-12-15 RX ADMIN — HYDROCODONE BITARTRATE AND ACETAMINOPHEN 1 TABLET: 7.5; 325 TABLET ORAL at 06:12

## 2023-12-15 RX ADMIN — ATORVASTATIN CALCIUM 20 MG: 20 TABLET, FILM COATED ORAL at 22:09

## 2023-12-15 RX ADMIN — NIFEDIPINE 30 MG: 30 TABLET, FILM COATED, EXTENDED RELEASE ORAL at 06:08

## 2023-12-15 RX ADMIN — Medication 10 ML: at 22:10

## 2023-12-15 RX ADMIN — HYDROMORPHONE HYDROCHLORIDE 0.5 MG: 1 INJECTION, SOLUTION INTRAMUSCULAR; INTRAVENOUS; SUBCUTANEOUS at 03:45

## 2023-12-15 RX ADMIN — METOPROLOL TARTRATE 100 MG: 50 TABLET, FILM COATED ORAL at 09:18

## 2023-12-15 RX ADMIN — INSULIN LISPRO 2 UNITS: 100 INJECTION, SOLUTION INTRAVENOUS; SUBCUTANEOUS at 22:10

## 2023-12-15 RX ADMIN — INSULIN GLARGINE 50 UNITS: 100 INJECTION, SOLUTION SUBCUTANEOUS at 09:18

## 2023-12-15 RX ADMIN — INSULIN LISPRO 2 UNITS: 100 INJECTION, SOLUTION INTRAVENOUS; SUBCUTANEOUS at 12:42

## 2023-12-15 RX ADMIN — Medication 500 MG: at 09:18

## 2023-12-15 RX ADMIN — METOPROLOL TARTRATE 100 MG: 50 TABLET, FILM COATED ORAL at 22:09

## 2023-12-15 RX ADMIN — DULOXETINE HYDROCHLORIDE 60 MG: 60 CAPSULE, DELAYED RELEASE ORAL at 22:09

## 2023-12-15 RX ADMIN — GABAPENTIN 300 MG: 300 CAPSULE ORAL at 22:09

## 2023-12-15 RX ADMIN — DULOXETINE HYDROCHLORIDE 60 MG: 60 CAPSULE, DELAYED RELEASE ORAL at 09:18

## 2023-12-15 RX ADMIN — MAGNESIUM OXIDE 400 MG (241.3 MG MAGNESIUM) TABLET 400 MG: TABLET at 09:18

## 2023-12-15 RX ADMIN — INSULIN LISPRO 8 UNITS: 100 INJECTION, SOLUTION INTRAVENOUS; SUBCUTANEOUS at 12:42

## 2023-12-15 RX ADMIN — INSULIN LISPRO 8 UNITS: 100 INJECTION, SOLUTION INTRAVENOUS; SUBCUTANEOUS at 17:41

## 2023-12-15 RX ADMIN — INSULIN LISPRO 8 UNITS: 100 INJECTION, SOLUTION INTRAVENOUS; SUBCUTANEOUS at 06:36

## 2023-12-15 RX ADMIN — Medication 2000 UNITS: at 09:18

## 2023-12-15 RX ADMIN — BUDESONIDE AND FORMOTEROL FUMARATE DIHYDRATE 2 PUFF: 160; 4.5 AEROSOL RESPIRATORY (INHALATION) at 07:52

## 2023-12-15 RX ADMIN — BUDESONIDE AND FORMOTEROL FUMARATE DIHYDRATE 2 PUFF: 160; 4.5 AEROSOL RESPIRATORY (INHALATION) at 20:22

## 2023-12-15 RX ADMIN — MONTELUKAST SODIUM 10 MG: 10 TABLET, FILM COATED ORAL at 22:09

## 2023-12-15 RX ADMIN — SENNOSIDES AND DOCUSATE SODIUM 2 TABLET: 50; 8.6 TABLET ORAL at 09:18

## 2023-12-15 RX ADMIN — Medication 10 ML: at 09:19

## 2023-12-15 RX ADMIN — SODIUM CHLORIDE 75 ML/HR: 9 INJECTION, SOLUTION INTRAVENOUS at 14:41

## 2023-12-15 RX ADMIN — FAMOTIDINE 40 MG: 20 TABLET ORAL at 06:08

## 2023-12-15 RX ADMIN — HYDROCODONE BITARTRATE AND ACETAMINOPHEN 1 TABLET: 7.5; 325 TABLET ORAL at 14:41

## 2023-12-15 RX ADMIN — INSULIN LISPRO 2 UNITS: 100 INJECTION, SOLUTION INTRAVENOUS; SUBCUTANEOUS at 06:36

## 2023-12-15 RX ADMIN — GABAPENTIN 300 MG: 300 CAPSULE ORAL at 09:18

## 2023-12-15 RX ADMIN — FERROUS SULFATE TAB 325 MG (65 MG ELEMENTAL FE) 325 MG: 325 (65 FE) TAB at 09:18

## 2023-12-15 RX ADMIN — FOLIC ACID 1 MG: 1 TABLET ORAL at 09:18

## 2023-12-15 RX ADMIN — SENNOSIDES AND DOCUSATE SODIUM 2 TABLET: 50; 8.6 TABLET ORAL at 22:09

## 2023-12-15 RX ADMIN — INSULIN LISPRO 3 UNITS: 100 INJECTION, SOLUTION INTRAVENOUS; SUBCUTANEOUS at 17:41

## 2023-12-15 NOTE — PROGRESS NOTES
"DAILY PROGRESS NOTE  [facility]    Patient Identification:  Name: Preston Wallis  Age: 58 y.o.  Sex: male  :  1965  MRN: [id number]         Primary Care Physician: Kimmy Riley MD    Subjective:  Interval History:He complains of leg pain.    Objective:    Scheduled Meds:atorvastatin, 20 mg, Oral, Nightly  budesonide-formoterol, 2 puff, Inhalation, BID - RT  calcium carbonate (oyster shell), 500 mg, Oral, Daily  cholecalciferol, 2,000 Units, Oral, Daily  DULoxetine, 60 mg, Oral, BID  famotidine, 40 mg, Oral, QAM  ferrous sulfate, 325 mg, Oral, Daily With Breakfast  finasteride, 5 mg, Oral, Daily  folic acid, 1 mg, Oral, Daily  gabapentin, 300 mg, Oral, Q12H  insulin glargine, 50 Units, Subcutaneous, Daily  insulin lispro, 2-7 Units, Subcutaneous, 4x Daily AC & at Bedtime  insulin lispro, 8 Units, Subcutaneous, TID AC  magnesium oxide, 400 mg, Oral, Daily  metoprolol tartrate, 100 mg, Oral, BID  montelukast, 10 mg, Oral, Nightly  NIFEdipine XL, 30 mg, Oral, QAM  O2, 2 L/min, Inhalation, Once  senna-docusate sodium, 2 tablet, Oral, BID  sodium chloride, 10 mL, Intravenous, Q12H      Continuous Infusions:sodium chloride, 75 mL/hr, Last Rate: 75 mL/hr (23)        Vital signs in last 24 hours:  Temp:  [96.9 °F (36.1 °C)-97.1 °F (36.2 °C)] 97.1 °F (36.2 °C)  Heart Rate:  [74-97] 92  Resp:  [16-20] 16  BP: (131-183)/() 131/71    Intake/Output:    Intake/Output Summary (Last 24 hours) at 12/15/2023 1403  Last data filed at 12/15/2023 0330  Gross per 24 hour   Intake --   Output 950 ml   Net -950 ml       Exam:  /71 (BP Location: Left arm)   Pulse 92   Temp 97.1 °F (36.2 °C) (Oral)   Resp 16   Ht 175.3 cm (69\")   Wt 127 kg (280 lb)   SpO2 96%   BMI 41.35 kg/m²     General Appearance:    Alert, cooperative, no distress   Head:    Normocephalic, without obvious abnormality, atraumatic   Eyes:       Throat:   Lips, tongue, gums normal   Neck:   Supple, symmetrical, " trachea midline, no JVD   Lungs:     Clear to auscultation bilaterally, respirations unlabored   Chest Wall:    No tenderness or deformity    Heart:    Regular rate and rhythm, S1 and S2 normal, no murmur,no  Rub or gallop   Abdomen:     Soft, nontender, bowel sounds active, no masses, no organomegaly    Extremities:   Extremities normal, left leg swelling in immobilizer., no cyanosis or edema   Pulses:      Skin:   Skin is warm and dry,  no rashes or palpable lesions   Neurologic:   no focal deficits noted      Lab Results (last 72 hours)       Procedure Component Value Units Date/Time    POC Glucose Once [[id number]]  (Abnormal) Collected: 12/15/23 1206    Specimen: Blood Updated: 12/15/23 1208     Glucose 177 mg/dL     POC Glucose Once [[id number]]  (Abnormal) Collected: 12/15/23 0746    Specimen: Blood Updated: 12/15/23 0747     Glucose 160 mg/dL     POC Glucose Once [[id number]]  (Abnormal) Collected: 12/15/23 0618    Specimen: Blood Updated: 12/15/23 0618     Glucose 167 mg/dL     CBC (No Diff) [[id number]]  (Abnormal) Collected: 12/15/23 0503    Specimen: Blood Updated: 12/15/23 0551     WBC 13.25 10*3/mm3      RBC 2.89 10*6/mm3      Hemoglobin 7.8 g/dL      Hematocrit 25.1 %      MCV 86.9 fL      MCH 27.0 pg      MCHC 31.1 g/dL      RDW 12.7 %      RDW-SD 39.9 fl      MPV 8.4 fL      Platelets 365 10*3/mm3     Basic Metabolic Panel [[id number]]  (Abnormal) Collected: 12/15/23 0503    Specimen: Blood Updated: 12/15/23 0542     Glucose 145 mg/dL      BUN 28 mg/dL      Creatinine 1.95 mg/dL      Sodium 140 mmol/L      Potassium 4.1 mmol/L      Chloride 105 mmol/L      CO2 25.0 mmol/L      Calcium 8.9 mg/dL      BUN/Creatinine Ratio 14.4     Anion Gap 10.0 mmol/L      eGFR 39.1 mL/min/1.73     Narrative:      GFR Normal >60  Chronic Kidney Disease <60  Kidney Failure <15      POC Glucose Once [[id number]]  (Normal) Collected: 12/14/23 2301    Specimen: Blood Updated: 12/14/23 2303     Glucose 75 mg/dL     POC Glucose  Once [[id number]]  (Normal) Collected: 12/14/23 1653    Specimen: Blood Updated: 12/14/23 1654     Glucose 125 mg/dL     Comprehensive Metabolic Panel [[id number]]  (Abnormal) Collected: 12/14/23 1556    Specimen: Blood Updated: 12/14/23 1628     Glucose 157 mg/dL      BUN 26 mg/dL      Creatinine 2.08 mg/dL      Sodium 137 mmol/L      Potassium 4.5 mmol/L      Chloride 100 mmol/L      CO2 25.6 mmol/L      Calcium 9.2 mg/dL      Total Protein 7.7 g/dL      Albumin 3.6 g/dL      ALT (SGPT) 26 U/L      AST (SGOT) 17 U/L      Alkaline Phosphatase 129 U/L      Total Bilirubin <0.2 mg/dL      Globulin 4.1 gm/dL      A/G Ratio 0.9 g/dL      BUN/Creatinine Ratio 12.5     Anion Gap 11.4 mmol/L      eGFR 36.2 mL/min/1.73     Narrative:      GFR Normal >60  Chronic Kidney Disease <60  Kidney Failure <15      CBC & Differential [[id number]]  (Abnormal) Collected: 12/14/23 1556    Specimen: Blood Updated: 12/14/23 1609    Narrative:      The following orders were created for panel order CBC & Differential.  Procedure                               Abnormality         Status                     ---------                               -----------         ------                     CBC Auto Differential[[id number]]        Abnormal            Final result                 Please view results for these tests on the individual orders.    CBC Auto Differential [[id number]]  (Abnormal) Collected: 12/14/23 1556    Specimen: Blood Updated: 12/14/23 1609     WBC 12.37 10*3/mm3      RBC 3.35 10*6/mm3      Hemoglobin 9.6 g/dL      Hematocrit 30.9 %      MCV 92.2 fL      MCH 28.7 pg      MCHC 31.1 g/dL      RDW 13.5 %      RDW-SD 45.8 fl      MPV 8.5 fL      Platelets 414 10*3/mm3      Neutrophil % 65.1 %      Lymphocyte % 20.5 %      Monocyte % 8.3 %      Eosinophil % 4.8 %      Basophil % 0.7 %      Immature Grans % 0.6 %      Neutrophils, Absolute 8.04 10*3/mm3      Lymphocytes, Absolute 2.54 10*3/mm3      Monocytes, Absolute 1.03 10*3/mm3       Eosinophils, Absolute 0.59 10*3/mm3      Basophils, Absolute 0.09 10*3/mm3      Immature Grans, Absolute 0.08 10*3/mm3      nRBC 0.1 /100 WBC           Data Review:  Results from last 7 days   Lab Units 12/15/23  0503 12/14/23  1556 12/14/23  0643   SODIUM mmol/L 140 137 137   POTASSIUM mmol/L 4.1 4.5 4.1   CHLORIDE mmol/L 105 100 101   CO2 mmol/L 25.0 25.6 27.1   BUN mg/dL 28* 26* 26*   CREATININE mg/dL 1.95* 2.08* 1.94*   GLUCOSE mg/dL 145* 157* 184*   CALCIUM mg/dL 8.9 9.2 9.2     Results from last 7 days   Lab Units 12/15/23  0503 12/14/23  1556 12/14/23  0643   WBC 10*3/mm3 13.25* 12.37* 13.47*   HEMOGLOBIN g/dL 7.8* 9.6* 8.5*   HEMATOCRIT % 25.1* 30.9* 27.0*   PLATELETS 10*3/mm3 365 414 408         Results from last 7 days   Lab Units 12/13/23  0540   HEMOGLOBIN A1C % 8.20*     Lab Results   Lab Value Date/Time    TROPONINT 0.130 (C) 04/06/2022 0321    TROPONINT 0.142 (C) 04/05/2022 2240    TROPONINT 0.134 (C) 04/05/2022 1529    TROPONINT <0.01 01/29/2019 0909    TROPONINT 0.01 01/29/2019 0100    TROPONINT 0.04 01/28/2019 1406         Results from last 7 days   Lab Units 12/14/23  1556 12/10/23  0608 12/09/23  0624   ALK PHOS U/L 129* 138* 132*   BILIRUBIN mg/dL <0.2 <0.2 0.2   ALT (SGPT) U/L 26 24 27   AST (SGOT) U/L 17 20 21         Results from last 7 days   Lab Units 12/13/23  0540   HEMOGLOBIN A1C % 8.20*     Glucose   Date/Time Value Ref Range Status   12/15/2023 1206 177 (H) 70 - 130 mg/dL Final   12/15/2023 0746 160 (H) 70 - 130 mg/dL Final   12/15/2023 0618 167 (H) 70 - 130 mg/dL Final   12/14/2023 2301 75 70 - 130 mg/dL Final   12/14/2023 1653 125 70 - 130 mg/dL Final   12/14/2023 1121 247 (H) 70 - 130 mg/dL Final   12/14/2023 0635 199 (H) 70 - 130 mg/dL Final   12/13/2023 2014 268 (H) 70 - 130 mg/dL Final           Past Medical History:   Diagnosis Date    Age-related cognitive decline     Allergic contact dermatitis     Allergies     Anemia     Bronchiectasis with acute lower respiratory infection      Charcot foot due to diabetes mellitus 9/10/2013    Chronic diastolic (congestive) heart failure     Chronic kidney disease     Chronic respiratory failure with hypoxia     Closed supracondylar fracture of femur 1/12/2022    COPD (chronic obstructive pulmonary disease)     Deep vein thrombosis (DVT) of lower extremity associated with air travel 1/13/2023    Dependence on supplemental oxygen     Eczema     Erectile dysfunction     due to organic reasons    Essential (primary) hypertension     Fracture     closed fracture of other tarsal and metatarsal bones    Fracture of proximal humerus 1/13/2023    GERD without esophagitis     High risk medication use     Hypercholesteremia     Hypomagnesemia     Infected stasis ulcer of left lower extremity 1/13/2023    Insomnia     Low back pain     Major depressive disorder     Morbid (severe) obesity due to excess calories     MRSA pneumonia     Muscle weakness     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 without evidence of necrosis     Obstructive sleep apnea (adult) (pediatric)     Other forms of dyspnea     Other long term (current) drug therapy     Other specified noninfective gastroenteritis and colitis     Other spondylosis, lumbar region     Pain in both knees     Paroxysmal atrial fibrillation     Peripheral neuropathy     attributed to type 2 diabetes    Pneumonia, unspecified organism     Polyneuropathy     Rash and other nonspecific skin eruption     Syncope and collapse     Tachycardia     Tinnitus 1/13/2023    Type 1 diabetes mellitus with diabetic chronic kidney disease     Type 2 diabetes mellitus     Unspecified fall, initial encounter     Urinary retention        Assessment:  Active Hospital Problems    Diagnosis  POA    **Closed fracture of left tibial plateau [S82.142A]  Yes    Essential hypertension [I10]  Yes    Type 2 DM with CKD stage 3 and hypertension [E11.22, I12.9, N18.30]  Yes    Chronic obstructive  pulmonary disease [J44.9]  Yes    Polyneuropathy [G62.9]  Yes    Paroxysmal atrial fibrillation [I48.0]  Yes    Obstructive sleep apnea [G47.33]  Yes    Chronic diastolic heart failure [I50.32]  Yes      Resolved Hospital Problems   No resolved problems to display.       Plan:  Pain  control. Follow up on CT left leg and get XR right tib fib. Follow up lab.    Pain control.      Bautista Tse MD  12/15/2023  14:03 EST

## 2023-12-15 NOTE — CASE MANAGEMENT/SOCIAL WORK
Discharge Planning Assessment  [facility]     Patient Name: Preston Wallis  MRN: [id number]  Today's Date: 12/15/2023    Admit Date: 12/14/2023    Plan: Home with [facility] (current)   Discharge Needs Assessment       Row Name 12/15/23 0934       Living Environment    People in Home child(chon), adult    Current Living Arrangements home    Primary Care Provided by self    Provides Primary Care For no one    Family Caregiver if Needed child(chon), adult;spouse    Quality of Family Relationships helpful;involved    Able to Return to Prior Arrangements yes       Resource/Environmental Concerns    Resource/Environmental Concerns none       Transition Planning    Patient/Family Anticipates Transition to home with family    Patient/Family Anticipated Services at Transition home health care    Transportation Anticipated family or friend will provide       Discharge Needs Assessment    Readmission Within the Last 30 Days no previous admission in last 30 days    Current Outpatient/Agency/Support Group homecare agency    Equipment Currently Used at Home wheelchair    Concerns to be Addressed discharge planning    Equipment Needed After Discharge none    Outpatient/Agency/Support Group Needs homecare agency    Discharge Facility/Level of Care Needs home with home health                   Discharge Plan       Row Name 12/15/23 0935       Plan    Plan Home with [facility] (current)    Patient/Family in Agreement with Plan yes    Plan Comments Spoke with pt bedside. Confirmed facesheet correct. Pt reports he lives at home with his wife and 2 adult children. Pt is current with [facility] . Pt has been to SNF in past in [place] but wife states he will be coming home not to SNF. Pt has weheelchair and walker at home and ramp to get in his home. Ortho consult pending. CCP to follow.                  Continued Care and Services - Admitted Since 12/14/2023       Home Medical Care       Service Provider Request Status Selected Services Address  Phone Fax Patient Preferred    [facility] Request Sent N[facility]A [address], [address] [address] [phone] --                  Selected Continued Care - Episodes Includes continued care and service providers with selected services from the active episodes listed below      Chronic Care Management Episode start date: 6/9/2022   There are no active outsourced providers for this episode.                 Selected Continued Care - Prior Encounters Includes continued care and service providers with selected services from prior encounters from 9/15/2023 to 12/15/2023      Discharged on 12/14/2023 Admission date: 12/6/2023 - Discharge disposition: Home or Self Care      Home Medical Care       Service Provider Selected Services Address Phone Fax Patient Preferred    [facility] [address], [address] [address] [phone] --                             Demographic Summary    No documentation.                  Functional Status    No documentation.                  Psychosocial    No documentation.                  Abuse/Neglect    No documentation.                  Legal    No documentation.                  Substance Abuse    No documentation.                  Patient Forms    No documentation.                     MONICA Morales

## 2023-12-15 NOTE — CONSULTS
Orthopedic Consult      Patient: Preston Wallis  YOB: 1965     Date of Admission: 12/14/2023  2:35 PM    Medical Record Number: [id number]    Attending Physician: Bautista Tse MD    Consulting Physician: Hugo Kline MD    Reason for Consult: Left proximal tibia fracture comminuted, closed, minimal displacement    History of Present Illness: 58 y.o. male admitted to [facility] with Immobility [Z74.09]  Closed fracture of left tibial plateau [S82.142A]  Chronic obstructive pulmonary disease, unspecified COPD type [J44.9]  Chronic kidney disease, unspecified CKD stage [N18.9]  Closed fracture of medial portion of left tibial plateau, initial encounter [S82.132A]  Type 2 diabetes mellitus with other specified complication, unspecified whether long term insulin use [E11.69].     The patient was evaluated in the emergency room and was diagnosed with a left tibia fracture.   Secondary to the age and multiple medical comorbidities the patient was admitted to the hospitalist.   As I was on call for the emergency room I was consulted for further evaluation and treatment.     The patient was in the usual state of health and has been discharged from [facility] yesterday late evening.  While going home he tried to transfer from his wheelchair to the vehicle and felt a snap and pop in his left leg and has been readmitted after evaluation in the emergency department with a fracture.  He noticed sudden onset of severe pain and inability to transfer.    Apparently at baseline he has been a minimal ambulator.  He has been using a wheelchair for several months.  He does have a high BMI.  Apparently he has had a foot fracture that was managed without surgery and this resulted in his loss of mobility.  He also has a history of right femur fracture that has been managed with intramedullary nail.    Denies any history of loss of consciousness, headache, vomiting, or seizures.   Denies any other  injuries.   The patient is accompanied by  family members  to this hospital visit.    The patient has history of : Chronic kidney disease, COPD, DVT, diabetes mellitus, Charcot neuropathy, chronic diastolic congestive heart failure, morbid obesity, history of MRSA  The patient is on anticoagulants:       Past medical history, Past surgical history, family history, Social history, current medications, home medications Have been reviewed by me.    Past Medical History:   Diagnosis Date    Age-related cognitive decline     Allergic contact dermatitis     Allergies     Anemia     Bronchiectasis with acute lower respiratory infection     Charcot foot due to diabetes mellitus 9/10/2013    Chronic diastolic (congestive) heart failure     Chronic kidney disease     Chronic respiratory failure with hypoxia     Closed supracondylar fracture of femur 1/12/2022    COPD (chronic obstructive pulmonary disease)     Deep vein thrombosis (DVT) of lower extremity associated with air travel 1/13/2023    Dependence on supplemental oxygen     Eczema     Erectile dysfunction     due to organic reasons    Essential (primary) hypertension     Fracture     closed fracture of other tarsal and metatarsal bones    Fracture of proximal humerus 1/13/2023    GERD without esophagitis     High risk medication use     Hypercholesteremia     Hypomagnesemia     Infected stasis ulcer of left lower extremity 1/13/2023    Insomnia     Low back pain     Major depressive disorder     Morbid (severe) obesity due to excess calories     MRSA pneumonia     Muscle weakness     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 without evidence of necrosis     Obstructive sleep apnea (adult) (pediatric)     Other forms of dyspnea     Other long term (current) drug therapy     Other specified noninfective gastroenteritis and colitis     Other spondylosis, lumbar region     Pain in both knees     Paroxysmal atrial fibrillation      Peripheral neuropathy     attributed to type 2 diabetes    Pneumonia, unspecified organism     Polyneuropathy     Rash and other nonspecific skin eruption     Syncope and collapse     Tachycardia     Tinnitus 2023    Type 1 diabetes mellitus with diabetic chronic kidney disease     Type 2 diabetes mellitus     Unspecified fall, initial encounter     Urinary retention      Past Surgical History:   Procedure Laterality Date    CHOLECYSTECTOMY      CYSTOSCOPY      FEMUR SURGERY Left     Shravan placed    KNEE SURGERY Left     OTHER SURGICAL HISTORY Left     venous port    TONSILLECTOMY AND ADENOIDECTOMY       Social History     Occupational History    Occupation: disabled     Comment: due to copd   Tobacco Use    Smoking status: Former     Packs/day: 1.00     Years: 12.00     Additional pack years: 0.00     Total pack years: 12.00     Types: Cigarettes     Start date:      Quit date:      Years since quittin.9    Smokeless tobacco: Never   Vaping Use    Vaping Use: Never used   Substance and Sexual Activity    Alcohol use: Not Currently    Drug use: Never    Sexual activity: Defer      Social History     Social History Narrative    Not on file     Family History   Problem Relation Age of Onset    Coronary artery disease Mother     Hypertension Mother     Diabetes type II Mother     Asthma Father     Diabetes type II Sister     Cancer Sister           Allergies   Allergen Reactions    Benadryl [Diphenhydramine] Itching    Proventil [Albuterol] Other (See Comments)     Mouth sores          Home Medications:  Medications Prior to Admission   Medication Sig Dispense Refill Last Dose    apixaban (Eliquis) 5 MG tablet tablet Take 1 tablet by mouth Every 12 (Twelve) Hours. 180 tablet 2 2023    atorvastatin (LIPITOR) 20 MG tablet Take 1 tablet by mouth Every Night. 90 tablet 2 2023    B-D UF III MINI PEN NEEDLES 31G X 5 MM misc USE FOUR TIMES DAILY BEFORE MEALS AND AT BEDTIME 360 each 1 2023     Budeson-Glycopyrrol-Formoterol (Breztri Aerosphere) 160-9-4.8 MCG/ACT aerosol inhaler    Past Week    calcium citrate (CALCITRATE) 950 (200 Ca) MG tablet Take 1 tablet by mouth Daily. 90 tablet 2 12/14/2023    Cholecalciferol (Vitamin D3) 50 MCG (2000 UT) tablet Take 1 tablet by mouth Daily. 90 tablet 2 12/14/2023    dapagliflozin (Farxiga) 5 MG tablet tablet Take 1 tablet by mouth Daily. 90 tablet 2 12/14/2023    docusate sodium (COLACE) 100 MG capsule Take 1 capsule by mouth Daily. 90 capsule 2 12/14/2023    DULoxetine (CYMBALTA) 60 MG capsule Take 1 capsule by mouth 2 (Two) Times a Day. 180 capsule 2 12/14/2023    famotidine (PEPCID) 40 MG tablet Take 1 tablet by mouth Every Morning. 90 tablet 2 12/14/2023    ferrous gluconate (FERGON) 324 MG tablet Take 1 tablet by mouth Daily With Breakfast. 90 tablet 2 12/14/2023    finasteride (PROSCAR) 5 MG tablet Take 1 tablet by mouth Daily. 90 tablet 2 12/14/2023    Fluticasone-Salmeterol (ADVAIR/WIXELA) 500-50 MCG/ACT DISKUS Inhale 1 puff 2 (Two) Times a Day.   12/14/2023    folic acid (FOLVITE) 1 MG tablet Take 1 tablet by mouth Daily. 90 tablet 2 Past Month    gabapentin (NEURONTIN) 300 MG capsule TAKE ONE CAPSULE BY MOUTH EVERY MORNING AND TAKE TWO CAPSULES BY MOUTH EVERY NIGHT AT BEDTIME 270 capsule 2 12/14/2023    Insulin Glargine (Lantus SoloStar) 100 UNIT/ML injection pen Inject 50 Units under the skin into the appropriate area as directed Daily. 15 mL 0 12/14/2023    Insulin Lispro, 1 Unit Dial, (HUMALOG) 100 UNIT/ML solution pen-injector inject EIGHT units UNDER THE SKIN INTO THE APPROPRIATE AREA THREE TIMES DAILY PER sliding scale, IF BLOOD SUGAR < 160= 0 units, 161-220= 2 units, 221-280= 4 units, 281-340 = SIX units, 341-400 = EIGHT units 15 mL 1 12/14/2023    magnesium oxide (MAG-OX) 400 tablet tablet Take 1 tablet by mouth Daily. Started by [place]   12/14/2023    metoprolol tartrate (LOPRESSOR) 100 MG tablet Take 1 tablet by mouth 2 (Two) Times a Day. 189  tablet 2 12/14/2023    montelukast (SINGULAIR) 10 MG tablet Take 1 tablet by mouth every night at bedtime. 90 tablet 2 12/14/2023    NIFEdipine XL (PROCARDIA XL) 30 MG 24 hr tablet Take 1 tablet by mouth Every Morning. 90 tablet 2 12/14/2023    O2 (OXYGEN) 2 Liter O2 - CONTINUOUS (route: Oxygen)   12/15/2023    ondansetron ODT (ZOFRAN-ODT) 4 MG disintegrating tablet PLACE 1 TABLET ON THE TONGUE EVERY 8 HOURS AS NEEDED FOR NAUSEA AND VOMITING 10 tablet 0 Past Month    Semaglutide, 1 MG/DOSE, (Ozempic, 1 MG/DOSE,) 4 MG/3ML solution pen-injector Inject 1 mg under the skin into the appropriate area as directed 1 (One) Time Per Week. 3 mL 5 Past Week    Ventolin  (90 Base) MCG/ACT inhaler INHALE 2 PUFFS FOUR TIMES DAILY AS NEEDED FOR WHEEZING 18 g 11 Past Week    Continuous Blood Gluc  (FreeStyle Bethany 2 Townsend) device        Continuous Blood Gluc Sensor (FreeStyle Bethany 2 Sensor) misc USE every 14 DAYS 2 each 11     exenatide er (Bydureon BCise) 2 MG/0.85ML auto-injector injection Inject 0.85 mL under the skin into the appropriate area as directed 1 (One) Time Per Week.       glucose blood test strip 1 each by Other route Daily. Dx:   E11.40 100 each 4     TRUEplus Lancets 28G misc USE AS DIRECTED 100 each 0        Current Medications:  Scheduled Meds:atorvastatin, 20 mg, Oral, Nightly  budesonide-formoterol, 2 puff, Inhalation, BID - RT  calcium carbonate (oyster shell), 500 mg, Oral, Daily  cholecalciferol, 2,000 Units, Oral, Daily  DULoxetine, 60 mg, Oral, BID  famotidine, 40 mg, Oral, QAM  ferrous sulfate, 325 mg, Oral, Daily With Breakfast  finasteride, 5 mg, Oral, Daily  folic acid, 1 mg, Oral, Daily  gabapentin, 300 mg, Oral, Q12H  insulin glargine, 50 Units, Subcutaneous, Daily  insulin lispro, 2-7 Units, Subcutaneous, 4x Daily AC & at Bedtime  insulin lispro, 8 Units, Subcutaneous, TID AC  magnesium oxide, 400 mg, Oral, Daily  metoprolol tartrate, 100 mg, Oral, BID  montelukast, 10 mg, Oral,  Nightly  NIFEdipine XL, 30 mg, Oral, QAM  O2, 2 L/min, Inhalation, Once  senna-docusate sodium, 2 tablet, Oral, BID  sodium chloride, 10 mL, Intravenous, Q12H      Continuous Infusions:sodium chloride, 75 mL/hr, Last Rate: 75 mL/hr (12/15/23 1441)      PRN Meds:.  acetaminophen    albuterol    senna-docusate sodium **AND** polyethylene glycol **AND** bisacodyl **AND** bisacodyl    dextrose    dextrose    glucagon (human recombinant)    heparin    HYDROcodone-acetaminophen    HYDROcodone-acetaminophen    HYDROmorphone **AND** naloxone    melatonin    ondansetron **OR** ondansetron    Insert Peripheral IV **AND** sodium chloride    Access VAD **AND** sodium chloride    sodium chloride    sodium chloride    sodium chloride    Review of Systems:   A 12 point system review was reviewed with the patient and from the chart  and is negative except as mentioned in history of present illness.      Physical Exam: 58 y.o. male                    Vitals:    12/15/23 0330 12/15/23 0555 12/15/23 0556 12/15/23 0753   BP: 135/92 171/80 131/71    BP Location: Left arm Left arm Left arm    Patient Position: Lying Lying     Pulse: 93 97  92   Resp: 20 18  16   Temp: 96.9 °F (36.1 °C) 97.1 °F (36.2 °C)     TempSrc: Oral Oral     SpO2: 99% 97%  96%   Weight: 127 kg (280 lb)      Height:            Gait: Not evaluated.     Mental/HEENT/cardio/skin: The patient's general appearance was well-nourished, well-hydrated, no acute distress.  Orientation was alert and oriented ×3.  The patient's mood was normal.   Pulmonary exam shows normal late exchange, no labored breathing, or shortness of breath.    The  skin exam showed normal temperature and color in the area of examination.    Extremities: Left leg positive swelling, positive tenderness, attempted movements of the left leg are painful and restricted.    Pulses:  Pulses palpable and equal bilaterally    Diagnostic Tests:    Results from last 7 days   Lab Units 12/15/23  0503 12/14/23  1476  "12/14/23  0643   WBC 10*3/mm3 13.25* 12.37* 13.47*   HEMOGLOBIN g/dL 7.8* 9.6* 8.5*   HEMATOCRIT % 25.1* 30.9* 27.0*   PLATELETS 10*3/mm3 365 414 408     Results from last 7 days   Lab Units 12/15/23  0503 12/14/23  1556 12/14/23  0643   SODIUM mmol/L 140 137 137   POTASSIUM mmol/L 4.1 4.5 4.1   CHLORIDE mmol/L 105 100 101   CO2 mmol/L 25.0 25.6 27.1   BUN mg/dL 28* 26* 26*   CREATININE mg/dL 1.95* 2.08* 1.94*   GLUCOSE mg/dL 145* 157* 184*   CALCIUM mg/dL 8.9 9.2 9.2         Lab Results   Component Value Date    URICACID 4.2 12/13/2023     No results found for: \"CRYSTAL\"  Microbiology Results (last 10 days)       Procedure Component Value - Date/Time    Respiratory Culture - Sputum, Cough [[id number]] Collected: 12/08/23 0656    Lab Status: Final result Specimen: Sputum from Cough Updated: 12/10/23 0937     Respiratory Culture Rare Normal respiratory alexander. No S. aureus or Pseudomonas aeruginosa detected. Final report.     Gram Stain Moderate (3+) WBCs per low power field      No epithelial cells seen      No organisms seen    Respiratory Culture - Sputum, Cough [[id number]] Collected: 12/07/23 2305    Lab Status: Final result Specimen: Sputum from Cough Updated: 12/08/23 0546     Respiratory Culture Rejected     Gram Stain No epithelial cells seen      Few (2+) WBCs seen      Occasional Gram positive cocci in pairs      Occasional Gram negative bacilli    Narrative:      Specimen rejected due to oropharyngeal contamination. Please reorder and recollect specimen if clinically necessary.    MRSA Screen, PCR (Inpatient) - Swab, Nares [[id number]]  (Normal) Collected: 12/07/23 0811    Lab Status: Final result Specimen: Swab from Nares Updated: 12/07/23 0936     MRSA PCR No MRSA Detected    Narrative:      The negative predictive value of this diagnostic test is high and should only be used to consider de-escalating anti-MRSA therapy. A positive result may indicate colonization with MRSA and must be correlated clinically. "    S. Pneumo Ag Urine or CSF - Urine, Urine, Clean Catch [[id number]]  (Normal) Collected: 12/07/23 0811    Lab Status: Final result Specimen: Urine, Clean Catch Updated: 12/07/23 0919     Strep Pneumo Ag Negative    Legionella Antigen, Urine - Urine, Urine, Clean Catch [[id number]]  (Normal) Collected: 12/07/23 0811    Lab Status: Final result Specimen: Urine, Clean Catch Updated: 12/07/23 0919     LEGIONELLA ANTIGEN, URINE Negative          CT Lower Extremity Left Without Contrast    Result Date: 12/15/2023  Comminuted, impacted, minimally displaced proximal left tibial diametaphysis fracture. There is also an acute, comminuted, intra-articular fracture involving the fibular head and proximal metaphysis.  There is a retrograde nail in the distal femur which extends 10 mm beyond the articular cortex of the femoral trochlea. Advanced osteoarthritic change at the knee with an old, healed fracture along the posterior aspect of the medial tibial plateau. No acute intra-articular fracture at the knee.    Radiation dose reduction techniques were utilized, including automated exposure control and exposure modulation based on body size.   This report was finalized on 12/15/2023 4:20 PM by Dr. Iam Gaston M.D on Workstation: [id number]      XR Knee 1 or 2 View Left    Result Date: 12/14/2023   Proximal left tibia and fibula fractures. Degenerative, chronic, and postsurgical changes.    This report was finalized on 12/14/2023 3:32 PM by Dr. Linden Magallon M.D on Workstation: [id number]      XR Tibia Fibula 2 View Left    Result Date: 12/14/2023   Proximal left tibia and fibula fractures. Degenerative, chronic, and postsurgical changes.    This report was finalized on 12/14/2023 3:32 PM by Dr. Linden Magallon M.D on Workstation: [id number]      XR Chest PA & Lateral    Result Date: 12/8/2023  Unchanged at least partial right upper lobe collapse with right-sided volume loss and rightward mediastinal shift. Associated  widening of the right paratracheal stripe. Very similar bilateral right greater than left parenchymal opacities. Right lower lobe opacity silhouettes the shifted right heart border. Consider updated chest CT. Right subclavian port with tip overlying the mid SVC. Overlapping left subclavian central venous catheter tubing. No pleural effusion or pneumothorax. Stable normal size rightward shifted cardiomediastinal silhouette.  This report was finalized on 12/8/2023 12:28 PM by Dr. Pawan Mcallister M.D on Workstation: [id number]       The labs, X-ray results for preoperative evaluation have been reviewed by me.    Assessment: Left proximal tibia fracture metaphyseal with combination, minimal displacement  Left knee osteoarthritis primary degenerative      Closed fracture of left tibial plateau    Polyneuropathy    Paroxysmal atrial fibrillation    Obstructive sleep apnea    Chronic diastolic heart failure    Chronic obstructive pulmonary disease    Type 2 DM with CKD stage 3 and hypertension    Essential hypertension      Plan:    Options and alternatives were discussed in detail with the patient and   family.   The patient is indicated for nonoperative management/possible open reduction internal fixation left tibial fracture.  He has severe osteopenia.  He is a minimal ambulator.  Despite best explanation the patient and family want to proceed with open reduction internal fixation.    The patient voiced understanding of the risks, benefits, and alternative forms of treatment that were discussed including but not limited to  Infection, DVT, pulmonary embolism, fat embolism, malunion, nonunion, Leg length discrepancy, medical risks including stroke, heart attack have been discussed. Despite the risks involved the patient and   family consent to proceed with open reduction internal fixation of left proximal tibial  fracture.     The patient is scheduled for the above surgery tentatively for December 22, 2023.    The  patient's admitting service has seen the patient and the patient is   cleared to the operating room.     He has been on an anticoagulant.  He does have significant swelling but there is no sign of compartment syndrome.  We are going to monitor him.  He is a high risk for open surgery.  He will need a plate fixation and this is likely to help him with range of motion of the knee however I am not certain if I will be able to get him weightbearing quickly enough secondary to his severe osteopenia and history of osteoarthritis in the knee and also with morbid obesity.    Surgery will be scheduled for about a 1 week approximately from today due to concerns for compartment syndrome/breakdown of the skin.  I do not think that he will need an external fixator but that will be 1 option if he starts having signs of compartment syndrome.  I will keep him under observation for another 23 hours before making a decision.  If there is no compartment syndrome on December 16, 2023 he can possibly be transferred to a subacute rehab and brought back for the surgery on an elective basis.  He will need admission for his surgery.    Date: 12/15/2023    Hugo Kline MD     CC: Kimmy [place] MD; MD Dedrick Rawls Lyle E, MD

## 2023-12-15 NOTE — PROGRESS NOTES
Case Management Discharge Note      Final Note: DC'd home 12/14 with [facility] following.    Provided Post Acute Provider List?: N/A  Provided Post Acute Provider Quality & Resource List?: N/A         Home Medical Care Coordination complete.      Service Provider Selected Services Address Phone Fax Patient Preferred    [facility] [address] [place] [address], [facility] [address] [id number] [phone] --                      Selected Continued Care - Episodes Includes continued care and service providers with selected services from the active episodes listed below      Chronic Care Management Episode start date: 6/9/2022   There are no active outsourced providers for this episode.                 Transportation Services  Private: Car    Final Discharge Disposition Code: 06 - home with home health care

## 2023-12-15 NOTE — PROGRESS NOTES
"Enter Query Response Below      Query Response: acute kidney injury and acute respiratory failure due to sepsis              If applicable, please update the problem list.       Patient: Preston Wallis \"Gómez\"        : 1965  Account: [id number]           Admit Date: 2023        How to Respond to this query:       a. Click New Note     b. Answer query within the yellow box.                c. Update the Problem List, if applicable.      If you have any questions about this query contact me at: [email or web address]     Dr. Moser,    Patient presented in transfer on .  Discharge summary includes diagnoses of sepsis.  Patient also documented to have acute on chronic respiratory failure, bronchiectasis, and pneumonia.  Nephrology documented patient's acute kidney injury as \"multifactorial.\" Discharge summary includes, \"He initially presented to [facility] on 2023 and a CT showed evidence of right lower lobe pneumonia.  A respiratory viral panel was negative and a MRSA swab was positive.  He was started on Vancomycin and Cefepime.  Once the sputum cultures resulted on 12/3/2023, he was started on Bactrim and Zosyn.  He showed evidence of worsening pneumonia after a few days and his oxygen requirements increased to 4L NC from his baseline of 2L NC. He also had persistent leukocytosis. The Bactrim was discontinued and he was started on Zyvox for MRSA pneumonia.  He was also thought to be having a bronchiectasis exacerbation.  His kidney function has also worsened and he was transferred to [facility] for evaluation by infectious disease and nephrology.\"    Can this be further specified as:    - acute kidney injury and acute respiratory failure due to sepsis  - acute kidney injury due to sepsis, acute respiratory failure not due to sepsis  - acute kidney injury not due to sepsis, acute respiratory failure due to sepsis  - other ___________________________  - clinically " indeterminable    By submitting this query, we are merely seeking further clarification of documentation to accurately reflect all conditions that you are monitoring, evaluating, treating or that extend the hospitalization or utilize additional resources of care. Please utilize your independent clinical judgment when addressing the question(s) above.     This query and your response, once completed, will be entered into the legal medical record.    Sincerely,  Shabana Aguilar RN [facility]  Clinical Documentation Integrity Program

## 2023-12-15 NOTE — DISCHARGE PLACEMENT REQUEST
"Preston Wallis \"Gómez\" (58 y.o. Male)       Date of Birth   1965    Social Security Number       Address   [address] [place] [address]    Home Phone       MRN   [id number]       Shinto   Pent[employer]alism    Marital Status                               Admission Date   12/14/23    Admission Type   Urgent    Admitting Provider   Yohana Anaya MD    Attending Provider   Bautista Tse MD    Department, Room/Bed   [address], P793/1       Discharge Date       Discharge Disposition       Discharge Destination                                 Attending Provider: Bautista Tse MD    Allergies: Benadryl [Diphenhydramine], Proventil [Albuterol]    Isolation: None   Infection: MRSA/History Only (12/15/23)   Code Status: CPR    Ht: 175.3 cm (69\")   Wt: 127 kg (280 lb)    Admission Cmt: None   Principal Problem: Closed fracture of left tibial plateau [S82.142A]                   Active Insurance as of 12/14/2023       Primary Coverage       Payor Plan Insurance Group Employer/Plan Group    [facility] BY HE [facility] BY YING [id number]       Payor Plan Address Payor Plan Phone Number Payor Plan Fax Number Effective Dates    [address] [address]   7/1/2022 - None Entered    [facility] [address]         Subscriber Name Subscriber Birth Date Member ID       PRESTON WALLIS 1965 [id number]                     Emergency Contacts        (Rel.) Home Phone Work Phone Mobile Phone    Kami Wallis (Spouse) [phone]    Elaine Zaldivar (Daughter) -- -- [phone]          "

## 2023-12-15 NOTE — PLAN OF CARE
Goal Outcome Evaluation:  Plan of Care Reviewed With: patient        Progress: no change  Outcome Evaluation: Pt admitted through the ER after falling in parking lot at discharge, fracture to tibia/fibula, A/O, on 2 L at baseline, accumax placed, using I/S to 1200, dilaudid given for c/o pain w/ relief, IVF infusing, blood sugar monitoring, contact and falls precautions maintained, lopez catheter to BSD

## 2023-12-15 NOTE — H&P
HISTORY AND PHYSICAL   [facility]        Date of Admission: 2023  Patient Identification:  Name: Preston Wallis  Age: 58 y.o.  Sex: male  :  1965  MRN: [id number]                     Primary Care Physician: Kimmy Riley MD    Chief Complaint:  58 year old gentleman who presented to the emergency room after a fall; he was discharged from the hospital earlier today after treatment for pneumonia; he was trying to get into the car when he fell; he complained of pain of his left knee;     History of Present Illness:   As above    Past Medical History:  Past Medical History:   Diagnosis Date    Age-related cognitive decline     Allergic contact dermatitis     Allergies     Anemia     Bronchiectasis with acute lower respiratory infection     Charcot foot due to diabetes mellitus 9/10/2013    Chronic diastolic (congestive) heart failure     Chronic kidney disease     Chronic respiratory failure with hypoxia     Closed supracondylar fracture of femur 2022    COPD (chronic obstructive pulmonary disease)     Deep vein thrombosis (DVT) of lower extremity associated with air travel 2023    Dependence on supplemental oxygen     Eczema     Erectile dysfunction     due to organic reasons    Essential (primary) hypertension     Fracture     closed fracture of other tarsal and metatarsal bones    Fracture of proximal humerus 2023    GERD without esophagitis     High risk medication use     Hypercholesteremia     Hypomagnesemia     Infected stasis ulcer of left lower extremity 2023    Insomnia     Low back pain     Major depressive disorder     Morbid (severe) obesity due to excess calories     MRSA pneumonia     Muscle weakness     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 without evidence of necrosis     Obstructive sleep apnea (adult) (pediatric)     Other forms of dyspnea     Other long term (current) drug therapy     Other specified  noninfective gastroenteritis and colitis     Other spondylosis, lumbar region     Pain in both knees     Paroxysmal atrial fibrillation     Peripheral neuropathy     attributed to type 2 diabetes    Pneumonia, unspecified organism     Polyneuropathy     Rash and other nonspecific skin eruption     Syncope and collapse     Tachycardia     Tinnitus 2023    Type 1 diabetes mellitus with diabetic chronic kidney disease     Type 2 diabetes mellitus     Unspecified fall, initial encounter     Urinary retention      Past Surgical History:  Past Surgical History:   Procedure Laterality Date    CHOLECYSTECTOMY      CYSTOSCOPY      FEMUR SURGERY Left     Shravan placed    KNEE SURGERY Left     OTHER SURGICAL HISTORY Left     venous port    TONSILLECTOMY AND ADENOIDECTOMY        Home Meds:  (Not in a hospital admission)      Allergies:  Allergies   Allergen Reactions    Benadryl [Diphenhydramine] Itching    Proventil [Albuterol] Other (See Comments)     Mouth sores       Immunizations:  Immunization History   Administered Date(s) Administered    Flu Vaccine Quad PF >36MO 10/18/2016, 10/16/2017, 2019    Flu Vaccine Split Quad 2019    Fluzone (or Fluarix & Flulaval for VFC) >6mos 10/18/2016, 10/16/2017, 2019, 10/19/2022, 2023    Influenza Injectable Mdck Pf Quad 10/19/2022    Influenza Quad Vaccine (Inpatient) 2013    Influenza, Unspecified 2020    Pneumococcal Conjugate 20-Valent (PCV20) 2023    Pneumococcal Polysaccharide (PPSV23) 1997    Tdap 2018     Social History:   Social History     Social History Narrative    Not on file     Social History     Socioeconomic History    Marital status:    Tobacco Use    Smoking status: Former     Packs/day: 1.00     Years: 12.00     Additional pack years: 0.00     Total pack years: 12.00     Types: Cigarettes     Start date:      Quit date:      Years since quittin.9    Smokeless tobacco: Never   Vaping Use     "Vaping Use: Never used   Substance and Sexual Activity    Alcohol use: Not Currently    Drug use: Never    Sexual activity: Defer       Family History:  Family History   Problem Relation Age of Onset    Coronary artery disease Mother     Hypertension Mother     Diabetes type II Mother     Asthma Father     Diabetes type II Sister     Cancer Sister         Review of Systems  See history of present illness and past medical history.  Patient denies headache, dizziness, syncope,   change in vision, change in hearing, change in taste, changes in weight, changes in appetite, focal weakness, numbness, or paresthesia.  Patient denies chest pain, palpitations, dyspnea, orthopnea, PND, cough, sinus pressure, rhinorrhea, epistaxis, hemoptysis, nausea, vomiting,hematemesis, diarrhea, constipation or hematochezia.  Denies cold or heat intolerance, polydipsia, polyuria, polyphagia. Denies hematuria, pyuria, dysuria, hesitancy, frequency or urgency. Denies consumption of raw and under cooked meats foods or change in water source.  Denies fever, chills, sweats, night sweats.      Objective:  T Max 24 hrs: Temp (24hrs), Av.8 °F (36.6 °C), Min:97.7 °F (36.5 °C), Max:97.9 °F (36.6 °C)    Vitals Ranges:   Temp:  [97.7 °F (36.5 °C)-97.9 °F (36.6 °C)] 97.9 °F (36.6 °C)  Heart Rate:  [72-96] 95  Resp:  [16-18] 18  BP: (152-179)/() 174/86      Exam:  /86   Pulse 95   Temp 97.9 °F (36.6 °C)   Resp 18   Ht 175.3 cm (69\")   Wt 126 kg (277 lb)   SpO2 92%   BMI 40.91 kg/m²     General Appearance:    Alert, cooperative, no distress, appears stated age   Head:    Normocephalic, without obvious abnormality, atraumatic   Eyes:    PERRL, conjunctivae/corneas clear, EOM's intact, both eyes   Ears:    Normal external ear canals, both ears   Nose:   Nares normal, septum midline, mucosa normal, no drainage    or sinus tenderness   Throat:   Lips, mucosa, and tongue normal   Neck:   Supple, symmetrical, trachea midline, no " adenopathy;     thyroid:  no enlargement/tenderness/nodules; no carotid    bruit or JVD   Back:     Symmetric, no curvature, ROM normal, no CVA tenderness   Lungs:     Clear to auscultation bilaterally, respirations unlabored   Chest Wall:    No tenderness or deformity    Heart:    Regular rate and rhythm, S1 and S2 normal, no murmur, rub   or gallop   Abdomen:     Soft, nontender, bowel sounds active all four quadrants,     no masses, no hepatomegaly, no splenomegaly   Extremities:   Extremities normal, atraumatic, swelling,mild deformity left knee                       .    Data Review:  Labs in chart were reviewed.  WBC   Date Value Ref Range Status   12/14/2023 12.37 (H) 3.40 - 10.80 10*3/mm3 Final     Hemoglobin   Date Value Ref Range Status   12/14/2023 9.6 (L) 13.0 - 17.7 g/dL Final     Hematocrit   Date Value Ref Range Status   12/14/2023 30.9 (L) 37.5 - 51.0 % Final     Platelets   Date Value Ref Range Status   12/14/2023 414 140 - 450 10*3/mm3 Final     Sodium   Date Value Ref Range Status   12/14/2023 137 136 - 145 mmol/L Final     Potassium   Date Value Ref Range Status   12/14/2023 4.5 3.5 - 5.2 mmol/L Final     Chloride   Date Value Ref Range Status   12/14/2023 100 98 - 107 mmol/L Final     CO2   Date Value Ref Range Status   12/14/2023 25.6 22.0 - 29.0 mmol/L Final     BUN   Date Value Ref Range Status   12/14/2023 26 (H) 6 - 20 mg/dL Final     Creatinine   Date Value Ref Range Status   12/14/2023 2.08 (H) 0.76 - 1.27 mg/dL Final     Glucose   Date Value Ref Range Status   12/14/2023 157 (H) 65 - 99 mg/dL Final     Calcium   Date Value Ref Range Status   12/14/2023 9.2 8.6 - 10.5 mg/dL Final     Magnesium   Date Value Ref Range Status   12/14/2023 1.9 1.6 - 2.6 mg/dL Final     Phosphorus   Date Value Ref Range Status   12/14/2023 3.8 2.5 - 4.5 mg/dL Final     AST (SGOT)   Date Value Ref Range Status   12/14/2023 17 1 - 40 U/L Final     ALT (SGPT)   Date Value Ref Range Status   12/14/2023 26 1 - 41  U/L Final     Alkaline Phosphatase   Date Value Ref Range Status   12/14/2023 129 (H) 39 - 117 U/L Final           Results from last 7 days   Lab Units 12/13/23  0540   HEMOGLOBIN A1C % 8.20*      Imaging Results (All)       Procedure Component Value Units Date/Time    XR Knee 1 or 2 View Left [[id number]] Collected: 12/14/23 1528     Updated: 12/14/23 1535    Narrative:      XR KNEE 1 OR 2 VW LEFT-, XR TIBIA FIBULA 2 VW LEFT-     INDICATIONS: Trauma.     TECHNIQUE: FRONTAL AND LATERAL VIEWS OF THE left knee, 3 views of the  left lower leg     COMPARISON: 3/23/2023     FINDINGS:     Right and screw surgical hardware of the distal left femur is partly  included, appears similar to prior exam, with appearance of protrusion  of the judith into the intercondylar groove. Old left femur deformity is  partly included.     New, mildly impacted fractures are seen at the level of the proximal  tibial metadiaphysis and proximal fibular metaphysis, with surrounding  soft tissue swelling. No other fractures are noted. No dislocation.  Degenerative changes are seen at the knee with prominent medial  tibiofemoral joint space narrowing.       Impression:         Proximal left tibia and fibula fractures. Degenerative, chronic, and  postsurgical changes.           This report was finalized on 12/14/2023 3:32 PM by Dr. Linden Magallon M.D on Workstation: [id number]       XR Tibia Fibula 2 View Left [[id number]] Collected: 12/14/23 1528     Updated: 12/14/23 1535    Narrative:      XR KNEE 1 OR 2 VW LEFT-, XR TIBIA FIBULA 2 VW LEFT-     INDICATIONS: Trauma.     TECHNIQUE: FRONTAL AND LATERAL VIEWS OF THE left knee, 3 views of the  left lower leg     COMPARISON: 3/23/2023     FINDINGS:     Right and screw surgical hardware of the distal left femur is partly  included, appears similar to prior exam, with appearance of protrusion  of the judith into the intercondylar groove. Old left femur deformity is  partly included.     New, mildly impacted  fractures are seen at the level of the proximal  tibial metadiaphysis and proximal fibular metaphysis, with surrounding  soft tissue swelling. No other fractures are noted. No dislocation.  Degenerative changes are seen at the knee with prominent medial  tibiofemoral joint space narrowing.       Impression:         Proximal left tibia and fibula fractures. Degenerative, chronic, and  postsurgical changes.           This report was finalized on 12/14/2023 3:32 PM by Dr. Linden Magallon M.D on Workstation: [id number]                 Assessment:  Active Hospital Problems    Diagnosis  POA    **Closed fracture of left tibial plateau [S82.142A]  Yes      Resolved Hospital Problems   No resolved problems to display.   Fall  Diabetes  Hypertension  A fib  Weakness  Ckd3  Anemia  Copd  Chronic diastolic chf  Obesity  Sleep apnea  Neurogenic bladder      Plan:  Ortho to see  Will likely need placement  He was having difficulty ambulating prior to breaking his leg and family was struggling to take care of him  Doyle cath for now  Pain control  Accu checks, insulin sliding scale  Dw patient and ed provider    Yohana Anaya MD  12/14/2023  22:28 EST

## 2023-12-15 NOTE — OUTREACH NOTE
Call Center TCM Note      Flowsheet Row Responses   [facility] patient discharged from? [place]   Does the patient have one of the following disease processes/diagnoses(primary or secondary)? Pneumonia   TCM attempt successful? No   Unsuccessful attempts Attempt 1   Change in Health Status Readmitted            Karen Arechiga RN    12/14/2023, 20:42 EST         Please obtain cardiac clearance for pt  Pt sees cardiologist due to fast heartbeat  Pt's cardiologist is Dr Caroline Peralta - Phone number is [phone] x [phone]  Pt does not have fax number

## 2023-12-15 NOTE — PLAN OF CARE
Goal Outcome Evaluation:              Outcome Evaluation: L Tibial Plateau fx due to fall, KI in place, A&O, VSS, 2L (baseline), f/c in place, bedrest, CT LLE & x-ray on RLE completed today, wound consulted but not seen pt as of yet, Norco for pain, accucheck w/SSI, educated on glucose monitoring, CTM

## 2023-12-16 LAB
ANION GAP SERPL CALCULATED.3IONS-SCNC: 8 MMOL/L (ref 5–15)
BASOPHILS # BLD AUTO: 0.06 10*3/MM3 (ref 0–0.2)
BASOPHILS NFR BLD AUTO: 0.6 % (ref 0–1.5)
BUN SERPL-MCNC: 28 MG/DL (ref 6–20)
BUN/CREAT SERPL: 15.4 (ref 7–25)
CALCIUM SPEC-SCNC: 8.5 MG/DL (ref 8.6–10.5)
CHLORIDE SERPL-SCNC: 104 MMOL/L (ref 98–107)
CO2 SERPL-SCNC: 25 MMOL/L (ref 22–29)
CREAT SERPL-MCNC: 1.82 MG/DL (ref 0.76–1.27)
DEPRECATED RDW RBC AUTO: 42.4 FL (ref 37–54)
EGFRCR SERPLBLD CKD-EPI 2021: 42.5 ML/MIN/1.73
EOSINOPHIL # BLD AUTO: 0.43 10*3/MM3 (ref 0–0.4)
EOSINOPHIL NFR BLD AUTO: 4 % (ref 0.3–6.2)
ERYTHROCYTE [DISTWIDTH] IN BLOOD BY AUTOMATED COUNT: 12.8 % (ref 12.3–15.4)
GLUCOSE BLDC GLUCOMTR-MCNC: 102 MG/DL (ref 70–130)
GLUCOSE BLDC GLUCOMTR-MCNC: 152 MG/DL (ref 70–130)
GLUCOSE BLDC GLUCOMTR-MCNC: 181 MG/DL (ref 70–130)
GLUCOSE BLDC GLUCOMTR-MCNC: 232 MG/DL (ref 70–130)
GLUCOSE SERPL-MCNC: 261 MG/DL (ref 65–99)
HCT VFR BLD AUTO: 22.7 % (ref 37.5–51)
HGB BLD-MCNC: 7.3 G/DL (ref 13–17.7)
IMM GRANULOCYTES # BLD AUTO: 0.08 10*3/MM3 (ref 0–0.05)
IMM GRANULOCYTES NFR BLD AUTO: 0.7 % (ref 0–0.5)
LYMPHOCYTES # BLD AUTO: 2.16 10*3/MM3 (ref 0.7–3.1)
LYMPHOCYTES NFR BLD AUTO: 19.9 % (ref 19.6–45.3)
MCH RBC QN AUTO: 29 PG (ref 26.6–33)
MCHC RBC AUTO-ENTMCNC: 32.2 G/DL (ref 31.5–35.7)
MCV RBC AUTO: 90.1 FL (ref 79–97)
MONOCYTES # BLD AUTO: 0.88 10*3/MM3 (ref 0.1–0.9)
MONOCYTES NFR BLD AUTO: 8.1 % (ref 5–12)
NEUTROPHILS NFR BLD AUTO: 66.7 % (ref 42.7–76)
NEUTROPHILS NFR BLD AUTO: 7.25 10*3/MM3 (ref 1.7–7)
NRBC BLD AUTO-RTO: 0.1 /100 WBC (ref 0–0.2)
PLATELET # BLD AUTO: 308 10*3/MM3 (ref 140–450)
PMV BLD AUTO: 8.9 FL (ref 6–12)
POTASSIUM SERPL-SCNC: 4.2 MMOL/L (ref 3.5–5.2)
RBC # BLD AUTO: 2.52 10*6/MM3 (ref 4.14–5.8)
SODIUM SERPL-SCNC: 137 MMOL/L (ref 136–145)
WBC NRBC COR # BLD AUTO: 10.86 10*3/MM3 (ref 3.4–10.8)

## 2023-12-16 PROCEDURE — 63710000001 INSULIN LISPRO (HUMAN) PER 5 UNITS: Performed by: INTERNAL MEDICINE

## 2023-12-16 PROCEDURE — 63710000001 INSULIN GLARGINE PER 5 UNITS: Performed by: ORTHOPAEDIC SURGERY

## 2023-12-16 PROCEDURE — G0378 HOSPITAL OBSERVATION PER HR: HCPCS

## 2023-12-16 PROCEDURE — 80048 BASIC METABOLIC PNL TOTAL CA: CPT | Performed by: HOSPITALIST

## 2023-12-16 PROCEDURE — 63710000001 INSULIN GLARGINE PER 5 UNITS: Performed by: INTERNAL MEDICINE

## 2023-12-16 PROCEDURE — 94761 N-INVAS EAR/PLS OXIMETRY MLT: CPT

## 2023-12-16 PROCEDURE — 94799 UNLISTED PULMONARY SVC/PX: CPT

## 2023-12-16 PROCEDURE — 94664 DEMO&/EVAL PT USE INHALER: CPT

## 2023-12-16 PROCEDURE — 82948 REAGENT STRIP/BLOOD GLUCOSE: CPT

## 2023-12-16 PROCEDURE — 63710000001 INSULIN LISPRO (HUMAN) PER 5 UNITS: Performed by: ORTHOPAEDIC SURGERY

## 2023-12-16 PROCEDURE — 85025 COMPLETE CBC W/AUTO DIFF WBC: CPT | Performed by: HOSPITALIST

## 2023-12-16 RX ADMIN — MONTELUKAST SODIUM 10 MG: 10 TABLET, FILM COATED ORAL at 21:11

## 2023-12-16 RX ADMIN — Medication 10 ML: at 08:35

## 2023-12-16 RX ADMIN — HYDROCODONE BITARTRATE AND ACETAMINOPHEN 1 TABLET: 7.5; 325 TABLET ORAL at 21:12

## 2023-12-16 RX ADMIN — INSULIN GLARGINE 50 UNITS: 100 INJECTION, SOLUTION SUBCUTANEOUS at 08:38

## 2023-12-16 RX ADMIN — DULOXETINE HYDROCHLORIDE 60 MG: 60 CAPSULE, DELAYED RELEASE ORAL at 08:37

## 2023-12-16 RX ADMIN — Medication 10 ML: at 21:14

## 2023-12-16 RX ADMIN — METOPROLOL TARTRATE 100 MG: 50 TABLET, FILM COATED ORAL at 21:12

## 2023-12-16 RX ADMIN — APIXABAN 5 MG: 5 TABLET, FILM COATED ORAL at 21:11

## 2023-12-16 RX ADMIN — HYDROCODONE BITARTRATE AND ACETAMINOPHEN 1 TABLET: 7.5; 325 TABLET ORAL at 08:42

## 2023-12-16 RX ADMIN — INSULIN LISPRO 8 UNITS: 100 INJECTION, SOLUTION INTRAVENOUS; SUBCUTANEOUS at 08:37

## 2023-12-16 RX ADMIN — FOLIC ACID 1 MG: 1 TABLET ORAL at 08:37

## 2023-12-16 RX ADMIN — BUDESONIDE AND FORMOTEROL FUMARATE DIHYDRATE 2 PUFF: 160; 4.5 AEROSOL RESPIRATORY (INHALATION) at 19:40

## 2023-12-16 RX ADMIN — BUDESONIDE AND FORMOTEROL FUMARATE DIHYDRATE 2 PUFF: 160; 4.5 AEROSOL RESPIRATORY (INHALATION) at 08:47

## 2023-12-16 RX ADMIN — APIXABAN 5 MG: 5 TABLET, FILM COATED ORAL at 11:56

## 2023-12-16 RX ADMIN — Medication 2000 UNITS: at 08:37

## 2023-12-16 RX ADMIN — INSULIN LISPRO 8 UNITS: 100 INJECTION, SOLUTION INTRAVENOUS; SUBCUTANEOUS at 11:56

## 2023-12-16 RX ADMIN — FINASTERIDE 5 MG: 5 TABLET, FILM COATED ORAL at 08:37

## 2023-12-16 RX ADMIN — INSULIN LISPRO 2 UNITS: 100 INJECTION, SOLUTION INTRAVENOUS; SUBCUTANEOUS at 21:12

## 2023-12-16 RX ADMIN — Medication 500 MG: at 08:37

## 2023-12-16 RX ADMIN — NIFEDIPINE 30 MG: 30 TABLET, FILM COATED, EXTENDED RELEASE ORAL at 06:20

## 2023-12-16 RX ADMIN — INSULIN LISPRO 2 UNITS: 100 INJECTION, SOLUTION INTRAVENOUS; SUBCUTANEOUS at 11:56

## 2023-12-16 RX ADMIN — METOPROLOL TARTRATE 100 MG: 50 TABLET, FILM COATED ORAL at 08:36

## 2023-12-16 RX ADMIN — DULOXETINE HYDROCHLORIDE 60 MG: 60 CAPSULE, DELAYED RELEASE ORAL at 21:11

## 2023-12-16 RX ADMIN — GABAPENTIN 300 MG: 300 CAPSULE ORAL at 08:37

## 2023-12-16 RX ADMIN — GABAPENTIN 300 MG: 300 CAPSULE ORAL at 21:11

## 2023-12-16 RX ADMIN — MAGNESIUM OXIDE 400 MG (241.3 MG MAGNESIUM) TABLET 400 MG: TABLET at 08:36

## 2023-12-16 RX ADMIN — INSULIN LISPRO 8 UNITS: 100 INJECTION, SOLUTION INTRAVENOUS; SUBCUTANEOUS at 16:42

## 2023-12-16 RX ADMIN — INSULIN LISPRO 3 UNITS: 100 INJECTION, SOLUTION INTRAVENOUS; SUBCUTANEOUS at 08:37

## 2023-12-16 RX ADMIN — HYDROCODONE BITARTRATE AND ACETAMINOPHEN 1 TABLET: 7.5; 325 TABLET ORAL at 15:25

## 2023-12-16 RX ADMIN — ATORVASTATIN CALCIUM 20 MG: 20 TABLET, FILM COATED ORAL at 21:12

## 2023-12-16 RX ADMIN — FAMOTIDINE 40 MG: 20 TABLET ORAL at 06:20

## 2023-12-16 RX ADMIN — FERROUS SULFATE TAB 325 MG (65 MG ELEMENTAL FE) 325 MG: 325 (65 FE) TAB at 08:37

## 2023-12-16 NOTE — PROGRESS NOTES
I have seen the patient today    Left leg in a knee immobilizer.  Knee immobilizer adjusted by me.  There is evidence of some blisters over the anterolateral aspect of the proximal third of the leg near the fracture site.  Blisters are superficial and are filled with serous fluid.    Compartments are soft.    Due to the nature of the injury I think it is safer to hold surgery for 3 to 7 days.  We will reassess the skin condition again in a day or 2.    Okay to restart Eliquis pending surgery.    Secondary to scheduling conflicts either myself or one of my partners will take care of him.  If we do not have any cover we will request for transfer to [facility] as he has had previous surgery on his leg with that facility.

## 2023-12-16 NOTE — PLAN OF CARE
Goal Outcome Evaluation:  Plan of Care Reviewed With: patient        Progress: improving  Outcome Evaluation: A&Ox4. VSS. KI in place. DM with SSI. Chest port accessed, S/L. Norco for pain. Currently planning for surgery on 12/22, possibly d/c to SNF and then come back? Pending progress.

## 2023-12-16 NOTE — PROGRESS NOTES
DAILY PROGRESS NOTE  [facility]    Patient Identification:  Name: Preston Wallis  Age: 58 y.o.  Sex: male  :  1965  MRN: [id number]         Primary Care Physician: Kimmy Riley MD    Subjective:  Interval History: Feels sore though pain control seems adequate.  We discussed pain med regimen and he is only required a dose of IV overnight.  We are trying to transition to p.o.  Counseled I-S.  Denies any new chest pain or troubles breathing.  Denies any altered mentation.  When asked about bowel movements, he says currently having one.  No nausea no vomiting    Objective: Conversational and pleasant    Scheduled Meds:atorvastatin, 20 mg, Oral, Nightly  budesonide-formoterol, 2 puff, Inhalation, BID - RT  calcium carbonate (oyster shell), 500 mg, Oral, Daily  cholecalciferol, 2,000 Units, Oral, Daily  DULoxetine, 60 mg, Oral, BID  famotidine, 40 mg, Oral, QAM  ferrous sulfate, 325 mg, Oral, Daily With Breakfast  finasteride, 5 mg, Oral, Daily  folic acid, 1 mg, Oral, Daily  gabapentin, 300 mg, Oral, Q12H  insulin glargine, 50 Units, Subcutaneous, Daily  insulin lispro, 2-7 Units, Subcutaneous, 4x Daily AC & at Bedtime  insulin lispro, 8 Units, Subcutaneous, TID AC  magnesium oxide, 400 mg, Oral, Daily  metoprolol tartrate, 100 mg, Oral, BID  montelukast, 10 mg, Oral, Nightly  NIFEdipine XL, 30 mg, Oral, QAM  O2, 2 L/min, Inhalation, Once  senna-docusate sodium, 2 tablet, Oral, BID  sodium chloride, 10 mL, Intravenous, Q12H      Continuous Infusions:     Vital signs in last 24 hours:  Temp:  [97.7 °F (36.5 °C)-98.3 °F (36.8 °C)] 98.3 °F (36.8 °C)  Heart Rate:  [74-86] 81  Resp:  [18] 18  BP: (138-160)/(67-77) 138/67    Intake/Output:    Intake/Output Summary (Last 24 hours) at 2023 1784  Last data filed at 2023 0510  Gross per 24 hour   Intake 1180 ml   Output 2100 ml   Net -920 ml       Exam:  /67 (BP Location: Left arm, Patient Position: Lying)   Pulse 81   Temp 98.3  "°F (36.8 °C) (Oral)   Resp 18   Ht 175.3 cm (69\")   Wt 127 kg (280 lb)   SpO2 98%   BMI 41.35 kg/m²     General Appearance:    Alert, cooperative, nontoxic, AAOx3                         Throat:   Oral mucosa pink and moist                           Neck:   No JVD                         Lungs:    Clear to auscultation bilaterally, respirations unlabored                          Heart:    Regular rate and rhythm, S1 and S2 normal                  Abdomen:     Soft, nontender, bowel sounds active                 Extremities: Knee immobilizer left lower extremity, no pitting edema, NVM intact distally                                                    Data Review:  Labs in chart were reviewed.    Assessment:  Active Hospital Problems    Diagnosis  POA    **Closed fracture of left tibial plateau [S82.142A]  Yes    Essential hypertension [I10]  Yes    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       Plan:    Evaluated by Dr. Kline 12/15/2022 with surgical plans tentatively on 12/22/2023 for ORIF to left proximal tibia metaphyseal fracture with displacement and patient states has previous hardware in that extremity from the [facility] in years past   -Continue as needed pain control -trying to switch to p.o. slowly as he required IV overnight as reprep for probable transfer this upcoming week to SNF pending CCP and insurance coverage as he is unable to take care of himself in the home setting until then   -Continue bowel regimen   -Baseline 2 L O2   -AC on hold.  Nothing modifiable in preparation for the OR and medically stable        Recent pneumonia treated.  COPD without acute " exacerbation    CKD 3B stable -saline lock fluids -no need for daily trending    DM2 with decently controlled blood sugars compounded by neuropathy on gabapentin   -Continue basal with sliding scale    PAF with Eliquis on hold in preparation for OR.  Chronic diastolic CHF with no volume overload    Anemia of chronic disease -trend Hgb and if it goes any lower than we will consider transfusion   -Check iron levels in a.m.    Doyle catheter in place -claims utilizes in and out catheterization as outpatient.  Just leave Doyle in for now given immobility and lower extremity fracture    Baljit Neumann MD  12/16/2023  07:58 EST

## 2023-12-16 NOTE — PLAN OF CARE
A/Ox4. O2 at 2L/min per nasal cannula, baseline. No s/s distress. IVF NS at 75ml/hr infusing per chest port. W/ chronic Doyle. Last BM this evening. W/ KI to the LLE. On bed rest. On contact isolation for h/o MRSA.

## 2023-12-17 LAB
FERRITIN SERPL-MCNC: 90 NG/ML (ref 30–400)
GLUCOSE BLDC GLUCOMTR-MCNC: 113 MG/DL (ref 70–130)
GLUCOSE BLDC GLUCOMTR-MCNC: 122 MG/DL (ref 70–130)
GLUCOSE BLDC GLUCOMTR-MCNC: 177 MG/DL (ref 70–130)
GLUCOSE BLDC GLUCOMTR-MCNC: 75 MG/DL (ref 70–130)
HCT VFR BLD AUTO: 25.8 % (ref 37.5–51)
HGB BLD-MCNC: 8.3 G/DL (ref 13–17.7)
IRON 24H UR-MRATE: 40 MCG/DL (ref 59–158)
IRON SATN MFR SERPL: 14 % (ref 20–50)
TIBC SERPL-MCNC: 282 MCG/DL (ref 298–536)
TRANSFERRIN SERPL-MCNC: 189 MG/DL (ref 200–360)

## 2023-12-17 PROCEDURE — 85014 HEMATOCRIT: CPT | Performed by: HOSPITALIST

## 2023-12-17 PROCEDURE — 84466 ASSAY OF TRANSFERRIN: CPT | Performed by: HOSPITALIST

## 2023-12-17 PROCEDURE — 94799 UNLISTED PULMONARY SVC/PX: CPT

## 2023-12-17 PROCEDURE — 63710000001 INSULIN LISPRO (HUMAN) PER 5 UNITS: Performed by: INTERNAL MEDICINE

## 2023-12-17 PROCEDURE — 83540 ASSAY OF IRON: CPT | Performed by: HOSPITALIST

## 2023-12-17 PROCEDURE — 82728 ASSAY OF FERRITIN: CPT | Performed by: HOSPITALIST

## 2023-12-17 PROCEDURE — 82948 REAGENT STRIP/BLOOD GLUCOSE: CPT

## 2023-12-17 PROCEDURE — 63710000001 INSULIN GLARGINE PER 5 UNITS: Performed by: ORTHOPAEDIC SURGERY

## 2023-12-17 PROCEDURE — G0378 HOSPITAL OBSERVATION PER HR: HCPCS

## 2023-12-17 PROCEDURE — 63710000001 INSULIN LISPRO (HUMAN) PER 5 UNITS: Performed by: ORTHOPAEDIC SURGERY

## 2023-12-17 PROCEDURE — 85018 HEMOGLOBIN: CPT | Performed by: HOSPITALIST

## 2023-12-17 PROCEDURE — 63710000001 INSULIN GLARGINE PER 5 UNITS: Performed by: INTERNAL MEDICINE

## 2023-12-17 PROCEDURE — 94664 DEMO&/EVAL PT USE INHALER: CPT

## 2023-12-17 PROCEDURE — 94761 N-INVAS EAR/PLS OXIMETRY MLT: CPT

## 2023-12-17 RX ORDER — FERROUS SULFATE 325(65) MG
325 TABLET ORAL 2 TIMES DAILY WITH MEALS
Status: DISCONTINUED | OUTPATIENT
Start: 2023-12-17 | End: 2023-12-28 | Stop reason: HOSPADM

## 2023-12-17 RX ADMIN — HYDROCODONE BITARTRATE AND ACETAMINOPHEN 1 TABLET: 7.5; 325 TABLET ORAL at 18:13

## 2023-12-17 RX ADMIN — FERROUS SULFATE TAB 325 MG (65 MG ELEMENTAL FE) 325 MG: 325 (65 FE) TAB at 18:13

## 2023-12-17 RX ADMIN — Medication 500 MG: at 08:26

## 2023-12-17 RX ADMIN — BUDESONIDE AND FORMOTEROL FUMARATE DIHYDRATE 2 PUFF: 160; 4.5 AEROSOL RESPIRATORY (INHALATION) at 08:38

## 2023-12-17 RX ADMIN — INSULIN LISPRO 2 UNITS: 100 INJECTION, SOLUTION INTRAVENOUS; SUBCUTANEOUS at 11:31

## 2023-12-17 RX ADMIN — METOPROLOL TARTRATE 100 MG: 50 TABLET, FILM COATED ORAL at 20:58

## 2023-12-17 RX ADMIN — Medication 10 ML: at 08:29

## 2023-12-17 RX ADMIN — GABAPENTIN 300 MG: 300 CAPSULE ORAL at 08:26

## 2023-12-17 RX ADMIN — HYDROCODONE BITARTRATE AND ACETAMINOPHEN 1 TABLET: 7.5; 325 TABLET ORAL at 08:26

## 2023-12-17 RX ADMIN — APIXABAN 5 MG: 5 TABLET, FILM COATED ORAL at 20:58

## 2023-12-17 RX ADMIN — FOLIC ACID 1 MG: 1 TABLET ORAL at 08:26

## 2023-12-17 RX ADMIN — INSULIN LISPRO 8 UNITS: 100 INJECTION, SOLUTION INTRAVENOUS; SUBCUTANEOUS at 11:31

## 2023-12-17 RX ADMIN — ATORVASTATIN CALCIUM 20 MG: 20 TABLET, FILM COATED ORAL at 20:58

## 2023-12-17 RX ADMIN — FAMOTIDINE 40 MG: 20 TABLET ORAL at 06:34

## 2023-12-17 RX ADMIN — BUDESONIDE AND FORMOTEROL FUMARATE DIHYDRATE 2 PUFF: 160; 4.5 AEROSOL RESPIRATORY (INHALATION) at 19:04

## 2023-12-17 RX ADMIN — DULOXETINE HYDROCHLORIDE 60 MG: 60 CAPSULE, DELAYED RELEASE ORAL at 20:58

## 2023-12-17 RX ADMIN — GABAPENTIN 300 MG: 300 CAPSULE ORAL at 20:58

## 2023-12-17 RX ADMIN — MAGNESIUM OXIDE 400 MG (241.3 MG MAGNESIUM) TABLET 400 MG: TABLET at 08:26

## 2023-12-17 RX ADMIN — Medication 10 ML: at 20:58

## 2023-12-17 RX ADMIN — FERROUS SULFATE TAB 325 MG (65 MG ELEMENTAL FE) 325 MG: 325 (65 FE) TAB at 08:26

## 2023-12-17 RX ADMIN — DULOXETINE HYDROCHLORIDE 60 MG: 60 CAPSULE, DELAYED RELEASE ORAL at 08:26

## 2023-12-17 RX ADMIN — SENNOSIDES AND DOCUSATE SODIUM 2 TABLET: 50; 8.6 TABLET ORAL at 20:58

## 2023-12-17 RX ADMIN — FINASTERIDE 5 MG: 5 TABLET, FILM COATED ORAL at 08:26

## 2023-12-17 RX ADMIN — APIXABAN 5 MG: 5 TABLET, FILM COATED ORAL at 08:26

## 2023-12-17 RX ADMIN — Medication 2000 UNITS: at 08:26

## 2023-12-17 RX ADMIN — MONTELUKAST SODIUM 10 MG: 10 TABLET, FILM COATED ORAL at 20:58

## 2023-12-17 RX ADMIN — INSULIN GLARGINE 50 UNITS: 100 INJECTION, SOLUTION SUBCUTANEOUS at 08:26

## 2023-12-17 RX ADMIN — NIFEDIPINE 30 MG: 30 TABLET, FILM COATED, EXTENDED RELEASE ORAL at 06:34

## 2023-12-17 RX ADMIN — METOPROLOL TARTRATE 100 MG: 50 TABLET, FILM COATED ORAL at 08:26

## 2023-12-17 RX ADMIN — INSULIN LISPRO 8 UNITS: 100 INJECTION, SOLUTION INTRAVENOUS; SUBCUTANEOUS at 08:26

## 2023-12-17 NOTE — PLAN OF CARE
A/Ox4. O2 at 1L/min per nasal cannula, baseline. No s/s distress. W/ chest port, accessed. SLIV. W/ chronic Doyle. Last BM on 12/16/23. W/ KI to the LLE. On bed rest. On contact isolation for h/o MRSA.

## 2023-12-17 NOTE — PLAN OF CARE
Goal Outcome Evaluation:  Plan of Care Reviewed With: patient        Progress: no change  Outcome Evaluation: A&Ox4. VSS. KI in place. DM with SSI. Chest port accessed, S/L. Norco for pain. Continues to plan for surgery on 12/22, possibly discharging to SNF until then. Pending progress.

## 2023-12-17 NOTE — PROGRESS NOTES
I have seen the patient again today.    He is comfortably resting in bed.  Left leg no sign of compartment syndrome.  No new blisters.  Small superficial serous blisters resolving.    Eliquis has been restarted.  I will check on him again tomorrow and make appropriate surgical timing decision.  In any case surgery will not be sooner than Wednesday this week due to him being on Eliquis and secondary to the swelling.      I do plan to operate on him this Friday if he elects to proceed with surgery.

## 2023-12-17 NOTE — PROGRESS NOTES
"    DAILY PROGRESS NOTE  [facility]    Patient Identification:  Name: Preston Wallis  Age: 58 y.o.  Sex: male  :  1965  MRN: [id number]         Primary Care Physician: Kimmy Riley MD    Subjective:  Interval History: No new complaints.  No chest pain fever confusion vomiting    Objective: Sitting up in bed conversational and pleasant.  No family present    Scheduled Meds:apixaban, 5 mg, Oral, Q12H  atorvastatin, 20 mg, Oral, Nightly  budesonide-formoterol, 2 puff, Inhalation, BID - RT  calcium carbonate (oyster shell), 500 mg, Oral, Daily  cholecalciferol, 2,000 Units, Oral, Daily  DULoxetine, 60 mg, Oral, BID  famotidine, 40 mg, Oral, QAM  ferrous sulfate, 325 mg, Oral, Daily With Breakfast  finasteride, 5 mg, Oral, Daily  folic acid, 1 mg, Oral, Daily  gabapentin, 300 mg, Oral, Q12H  insulin glargine, 50 Units, Subcutaneous, Daily  insulin lispro, 2-7 Units, Subcutaneous, 4x Daily AC & at Bedtime  insulin lispro, 8 Units, Subcutaneous, TID AC  magnesium oxide, 400 mg, Oral, Daily  metoprolol tartrate, 100 mg, Oral, BID  montelukast, 10 mg, Oral, Nightly  NIFEdipine XL, 30 mg, Oral, QAM  O2, 2 L/min, Inhalation, Once  senna-docusate sodium, 2 tablet, Oral, BID  sodium chloride, 10 mL, Intravenous, Q12H      Continuous Infusions:     Vital signs in last 24 hours:  Temp:  [97.6 °F (36.4 °C)-98.1 °F (36.7 °C)] 97.9 °F (36.6 °C)  Heart Rate:  [69-84] 79  Resp:  [14-16] 16  BP: (131-154)/(66-75) 147/73    Intake/Output:    Intake/Output Summary (Last 24 hours) at 2023 0910  Last data filed at 2023 0545  Gross per 24 hour   Intake 1200 ml   Output 3650 ml   Net -2450 ml       Exam:  /73 (BP Location: Left arm, Patient Position: Sitting)   Pulse 79   Temp 97.9 °F (36.6 °C) (Oral)   Resp 16   Ht 175.3 cm (69\")   Wt 127 kg (280 lb)   SpO2 96%   BMI 41.35 kg/m²     General Appearance:    Alert, cooperative, AAOx3                         Throat:   Oral mucosa pink and " moist                         Lungs:    Clear to auscultation bilaterally, respirations unlabored                 Chest Wall:    No tenderness or deformity                          Heart:    Regular rate and rhythm, S1 and S2 normal                  Abdomen:     Soft, nontender, bowel sounds active                  Extremities: Reassuring volume status                        Pulses:   Pulses palpable in lower extremities                               Data Review:  Labs in chart were reviewed.    Assessment:  Active Hospital Problems    Diagnosis  POA    **Closed fracture of left tibial plateau [S82.142A]  Yes    Essential hypertension [I10]  Yes    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       Plan:    Awaiting final recommendations per orthopedics regarding surgery here versus [facility]    Eliquis continued    Recent pneumonia treated.  COPD without acute exacerbation     CKD 3B stable -saline lock fluids -no need for daily trending     DM2 with decently controlled blood sugars compounded by neuropathy on gabapentin              -Continue basal with sliding scale     PAF with Eliquis resumed.  Chronic diastolic CHF with no volume overload     Anemia of chronic disease -trend Hgb and if it goes any lower than we will consider transfusion              -Iron deficient and will increase frequency to twice daily   -Hgb trended up to 8.3 avoiding any need for transfusion     Doyle catheter in place -claims utilizes in and out catheterization as outpatient.  Doyle will remain in place given current immobility and lower extremity fracture      Baljit Neumann MD  12/17/2023  09:10 EST

## 2023-12-18 LAB
GLUCOSE BLDC GLUCOMTR-MCNC: 137 MG/DL (ref 70–130)
GLUCOSE BLDC GLUCOMTR-MCNC: 148 MG/DL (ref 70–130)
GLUCOSE BLDC GLUCOMTR-MCNC: 158 MG/DL (ref 70–130)
GLUCOSE BLDC GLUCOMTR-MCNC: 85 MG/DL (ref 70–130)

## 2023-12-18 PROCEDURE — 82948 REAGENT STRIP/BLOOD GLUCOSE: CPT

## 2023-12-18 PROCEDURE — 94664 DEMO&/EVAL PT USE INHALER: CPT

## 2023-12-18 PROCEDURE — 94799 UNLISTED PULMONARY SVC/PX: CPT

## 2023-12-18 PROCEDURE — 63710000001 INSULIN GLARGINE PER 5 UNITS: Performed by: ORTHOPAEDIC SURGERY

## 2023-12-18 PROCEDURE — 63710000001 INSULIN GLARGINE PER 5 UNITS: Performed by: INTERNAL MEDICINE

## 2023-12-18 PROCEDURE — 63710000001 INSULIN LISPRO (HUMAN) PER 5 UNITS: Performed by: INTERNAL MEDICINE

## 2023-12-18 PROCEDURE — 63710000001 INSULIN LISPRO (HUMAN) PER 5 UNITS: Performed by: ORTHOPAEDIC SURGERY

## 2023-12-18 PROCEDURE — G0378 HOSPITAL OBSERVATION PER HR: HCPCS

## 2023-12-18 RX ORDER — CASTOR OIL AND BALSAM, PERU 788; 87 MG/G; MG/G
1 OINTMENT TOPICAL EVERY 12 HOURS SCHEDULED
Status: DISCONTINUED | OUTPATIENT
Start: 2023-12-18 | End: 2023-12-28 | Stop reason: HOSPADM

## 2023-12-18 RX ADMIN — DULOXETINE HYDROCHLORIDE 60 MG: 60 CAPSULE, DELAYED RELEASE ORAL at 21:26

## 2023-12-18 RX ADMIN — INSULIN LISPRO 8 UNITS: 100 INJECTION, SOLUTION INTRAVENOUS; SUBCUTANEOUS at 11:53

## 2023-12-18 RX ADMIN — ACETAMINOPHEN 650 MG: 325 TABLET, FILM COATED ORAL at 21:24

## 2023-12-18 RX ADMIN — CASTOR OIL AND BALSAM, PERU 1 APPLICATION: 788; 87 OINTMENT TOPICAL at 17:17

## 2023-12-18 RX ADMIN — ZINC OXIDE 1 APPLICATION: 200 OINTMENT TOPICAL at 17:17

## 2023-12-18 RX ADMIN — FAMOTIDINE 40 MG: 20 TABLET ORAL at 06:38

## 2023-12-18 RX ADMIN — MUPIROCIN 1 APPLICATION: 20 OINTMENT TOPICAL at 21:23

## 2023-12-18 RX ADMIN — GABAPENTIN 300 MG: 300 CAPSULE ORAL at 09:26

## 2023-12-18 RX ADMIN — Medication 500 MG: at 09:28

## 2023-12-18 RX ADMIN — SENNOSIDES AND DOCUSATE SODIUM 2 TABLET: 50; 8.6 TABLET ORAL at 09:26

## 2023-12-18 RX ADMIN — FOLIC ACID 1 MG: 1 TABLET ORAL at 09:27

## 2023-12-18 RX ADMIN — GABAPENTIN 300 MG: 300 CAPSULE ORAL at 21:24

## 2023-12-18 RX ADMIN — APIXABAN 5 MG: 5 TABLET, FILM COATED ORAL at 21:24

## 2023-12-18 RX ADMIN — INSULIN LISPRO 8 UNITS: 100 INJECTION, SOLUTION INTRAVENOUS; SUBCUTANEOUS at 09:25

## 2023-12-18 RX ADMIN — METOPROLOL TARTRATE 100 MG: 50 TABLET, FILM COATED ORAL at 21:24

## 2023-12-18 RX ADMIN — Medication 10 ML: at 21:26

## 2023-12-18 RX ADMIN — MUPIROCIN 1 APPLICATION: 20 OINTMENT TOPICAL at 17:18

## 2023-12-18 RX ADMIN — MONTELUKAST SODIUM 10 MG: 10 TABLET, FILM COATED ORAL at 21:26

## 2023-12-18 RX ADMIN — INSULIN LISPRO 2 UNITS: 100 INJECTION, SOLUTION INTRAVENOUS; SUBCUTANEOUS at 21:23

## 2023-12-18 RX ADMIN — METOPROLOL TARTRATE 100 MG: 50 TABLET, FILM COATED ORAL at 09:26

## 2023-12-18 RX ADMIN — CASTOR OIL AND BALSAM, PERU 1 APPLICATION: 788; 87 OINTMENT TOPICAL at 21:23

## 2023-12-18 RX ADMIN — BUDESONIDE AND FORMOTEROL FUMARATE DIHYDRATE 2 PUFF: 160; 4.5 AEROSOL RESPIRATORY (INHALATION) at 20:04

## 2023-12-18 RX ADMIN — FERROUS SULFATE TAB 325 MG (65 MG ELEMENTAL FE) 325 MG: 325 (65 FE) TAB at 09:26

## 2023-12-18 RX ADMIN — NIFEDIPINE 30 MG: 30 TABLET, FILM COATED, EXTENDED RELEASE ORAL at 06:38

## 2023-12-18 RX ADMIN — HYDROCODONE BITARTRATE AND ACETAMINOPHEN 1 TABLET: 7.5; 325 TABLET ORAL at 00:41

## 2023-12-18 RX ADMIN — ZINC OXIDE 1 APPLICATION: 200 OINTMENT TOPICAL at 21:23

## 2023-12-18 RX ADMIN — MAGNESIUM OXIDE 400 MG (241.3 MG MAGNESIUM) TABLET 400 MG: TABLET at 09:28

## 2023-12-18 RX ADMIN — APIXABAN 5 MG: 5 TABLET, FILM COATED ORAL at 09:26

## 2023-12-18 RX ADMIN — FINASTERIDE 5 MG: 5 TABLET, FILM COATED ORAL at 09:27

## 2023-12-18 RX ADMIN — INSULIN GLARGINE 50 UNITS: 100 INJECTION, SOLUTION SUBCUTANEOUS at 09:25

## 2023-12-18 RX ADMIN — ATORVASTATIN CALCIUM 20 MG: 20 TABLET, FILM COATED ORAL at 21:24

## 2023-12-18 RX ADMIN — Medication 10 ML: at 09:28

## 2023-12-18 RX ADMIN — DULOXETINE HYDROCHLORIDE 60 MG: 60 CAPSULE, DELAYED RELEASE ORAL at 09:28

## 2023-12-18 RX ADMIN — Medication 2000 UNITS: at 09:26

## 2023-12-18 RX ADMIN — FERROUS SULFATE TAB 325 MG (65 MG ELEMENTAL FE) 325 MG: 325 (65 FE) TAB at 17:19

## 2023-12-18 RX ADMIN — BUDESONIDE AND FORMOTEROL FUMARATE DIHYDRATE 2 PUFF: 160; 4.5 AEROSOL RESPIRATORY (INHALATION) at 09:47

## 2023-12-18 RX ADMIN — HYDROCODONE BITARTRATE AND ACETAMINOPHEN 1 TABLET: 7.5; 325 TABLET ORAL at 06:38

## 2023-12-18 RX ADMIN — INSULIN LISPRO 8 UNITS: 100 INJECTION, SOLUTION INTRAVENOUS; SUBCUTANEOUS at 17:19

## 2023-12-18 NOTE — NURSING NOTE
Jamila Tanner, requested I speak to Dr. Neumann regarding removal of the lopez catheter.  The patient is scheduled for surgery this Friday.  Her concern was that the patient could possibly develop a cauti.  Patient self caths.  I spoke to Dr. Neumann regarding her concern.  He wanted the lopez to remain in because the patient is on bedrest.  Jamila Lora was notified.

## 2023-12-18 NOTE — NURSING NOTE
Wound/Ostomy: We see the patient at the request of the floor nurse, regarding multiple skin issues in left lower leg/feet, abdomen and Gluteal area. Patient alert, known of the previous admission,  upon assessment we could see redness, blanchable, excoriation and satellite lesions, possibly related to moisture and fungal infection associated on buttocks and Coccyx.   In addition, multiples scab on leg  and toes were  observed, Betadine ordered,  and another excoriation/scab on rt medial abdomen. Bactroban was ordered.  Also on left Heel localized purple discoloration with intact blister was noted, DTI POA.   Wound care order and pressure injury standing measures have been implemented into Epic.  Please re-consult for any additional needs.

## 2023-12-18 NOTE — PROGRESS NOTES
"    DAILY PROGRESS NOTE  [facility]    Patient Identification:  Name: Preston Wallis  Age: 58 y.o.  Sex: male  :  1965  MRN: [id number]         Primary Care Physician: Kimmy Riley MD    Subjective:  Interval History: Not voicing anything new    Objective:    Scheduled Meds:apixaban, 5 mg, Oral, Q12H  atorvastatin, 20 mg, Oral, Nightly  budesonide-formoterol, 2 puff, Inhalation, BID - RT  calcium carbonate (oyster shell), 500 mg, Oral, Daily  castor oil-balsam peru, 1 application , Topical, Q12H  cholecalciferol, 2,000 Units, Oral, Daily  DULoxetine, 60 mg, Oral, BID  famotidine, 40 mg, Oral, QAM  ferrous sulfate, 325 mg, Oral, BID With Meals  finasteride, 5 mg, Oral, Daily  folic acid, 1 mg, Oral, Daily  gabapentin, 300 mg, Oral, Q12H  hydrocortisone-bacitracin-zinc oxide-nystatin, 1 application , Topical, Q12H  insulin glargine, 50 Units, Subcutaneous, Daily  insulin lispro, 2-7 Units, Subcutaneous, 4x Daily AC & at Bedtime  insulin lispro, 8 Units, Subcutaneous, TID AC  magnesium oxide, 400 mg, Oral, Daily  metoprolol tartrate, 100 mg, Oral, BID  montelukast, 10 mg, Oral, Nightly  mupirocin, 1 application , Topical, Q12H  NIFEdipine XL, 30 mg, Oral, QAM  O2, 2 L/min, Inhalation, Once  senna-docusate sodium, 2 tablet, Oral, BID  sodium chloride, 10 mL, Intravenous, Q12H      Continuous Infusions:     Vital signs in last 24 hours:  Temp:  [97.5 °F (36.4 °C)-98.5 °F (36.9 °C)] 97.8 °F (36.6 °C)  Heart Rate:  [70-77] 75  Resp:  [16-18] 16  BP: (132-168)/(58-80) 168/80    Intake/Output:    Intake/Output Summary (Last 24 hours) at 2023 1240  Last data filed at 2023 1100  Gross per 24 hour   Intake 720 ml   Output 4000 ml   Net -3280 ml       Exam:  /80 (BP Location: Left arm, Patient Position: Lying)   Pulse 75   Temp 97.8 °F (36.6 °C) (Oral)   Resp 16   Ht 175.3 cm (69\")   Wt 127 kg (280 lb)   SpO2 97%   BMI 41.35 kg/m²     General Appearance:    Alert, " cooperative, no distress, AAOx3                         Throat:   Oral mucosa pink and moist                         Lungs:    Clear to auscultation bilaterally, respirations unlabored                 Chest Wall:    No tenderness or deformity                          Heart:    Regular rate and rhythm, S1 and S2 normal                  Abdomen:     Soft, nontender, bowel sounds active                  Extremities: Left knee in immobilizer                        Pulses:   Pulses palpable in lower extremities                              Data Review:  Labs in chart were reviewed.    Assessment:  Active Hospital Problems    Diagnosis  POA    **Closed fracture of left tibial plateau [S82.142A]  Yes    Essential hypertension [I10]  Yes    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       Plan:    Awaiting final recommendations per orthopedics regarding surgery here versus [facility] but it seems like the tentative plan is this Friday and may be even possibly Wednesday?     Eliquis continued for now-will defer to orthopedics to DC pending their schedule in OR timing     Recent pneumonia treated.  COPD without acute exacerbation     CKD 3B stable      DM2 with decently controlled blood sugars compounded by neuropathy on gabapentin              -Continue basal with sliding scale     PAF with Eliquis resumed.  Chronic diastolic CHF with no volume overload     Anemia of chronic disease/NAEL   -P.o. supplementation   -Hgb has been stable and noted previous improvement    Counseled I-S importance and technique today     Doyle catheter in place -claims utilizes in and out catheterization as outpatient.   Doyle will remain in place given current immobility and lower extremity fracture      Addendum -received a phone call about Doyle and asked to remove Doyle because he normally utilizes in and out self-catheterization.  This patient has a broken leg and is in a knee immobilizer and the Doyle is not going to be taken out unless the patient himself wants to switch over but given broken leg, he will likely increase chances of further infection due to limited mobility w/ I/O caths    Baljit Neumann MD  12/18/2023  12:40 EST

## 2023-12-18 NOTE — PLAN OF CARE
A/Ox4. O2 at 1L/min per nasal cannula, baseline. No s/s distress. W/ chest port, accessed, w/ good blood return, flushes well, changed ports and caps. SLIV. W/ chronic Doyle. Last BM on 12/16/23. W/ KI to the LLE. On bed rest. CHG wipes and gown changed this morning.

## 2023-12-19 LAB
GLUCOSE BLDC GLUCOMTR-MCNC: 140 MG/DL (ref 70–130)
GLUCOSE BLDC GLUCOMTR-MCNC: 187 MG/DL (ref 70–130)
GLUCOSE BLDC GLUCOMTR-MCNC: 187 MG/DL (ref 70–130)
GLUCOSE BLDC GLUCOMTR-MCNC: 188 MG/DL (ref 70–130)

## 2023-12-19 PROCEDURE — 63710000001 INSULIN LISPRO (HUMAN) PER 5 UNITS: Performed by: ORTHOPAEDIC SURGERY

## 2023-12-19 PROCEDURE — 63710000001 INSULIN GLARGINE PER 5 UNITS: Performed by: ORTHOPAEDIC SURGERY

## 2023-12-19 PROCEDURE — 94664 DEMO&/EVAL PT USE INHALER: CPT

## 2023-12-19 PROCEDURE — 63710000001 INSULIN GLARGINE PER 5 UNITS: Performed by: INTERNAL MEDICINE

## 2023-12-19 PROCEDURE — 63710000001 INSULIN LISPRO (HUMAN) PER 5 UNITS: Performed by: INTERNAL MEDICINE

## 2023-12-19 PROCEDURE — 94799 UNLISTED PULMONARY SVC/PX: CPT

## 2023-12-19 PROCEDURE — 94761 N-INVAS EAR/PLS OXIMETRY MLT: CPT

## 2023-12-19 PROCEDURE — 82948 REAGENT STRIP/BLOOD GLUCOSE: CPT

## 2023-12-19 PROCEDURE — G0378 HOSPITAL OBSERVATION PER HR: HCPCS

## 2023-12-19 RX ADMIN — SENNOSIDES AND DOCUSATE SODIUM 2 TABLET: 50; 8.6 TABLET ORAL at 21:40

## 2023-12-19 RX ADMIN — BUDESONIDE AND FORMOTEROL FUMARATE DIHYDRATE 2 PUFF: 160; 4.5 AEROSOL RESPIRATORY (INHALATION) at 19:22

## 2023-12-19 RX ADMIN — METOPROLOL TARTRATE 100 MG: 50 TABLET, FILM COATED ORAL at 21:40

## 2023-12-19 RX ADMIN — BUDESONIDE AND FORMOTEROL FUMARATE DIHYDRATE 2 PUFF: 160; 4.5 AEROSOL RESPIRATORY (INHALATION) at 08:57

## 2023-12-19 RX ADMIN — INSULIN LISPRO 8 UNITS: 100 INJECTION, SOLUTION INTRAVENOUS; SUBCUTANEOUS at 17:33

## 2023-12-19 RX ADMIN — GABAPENTIN 300 MG: 300 CAPSULE ORAL at 08:12

## 2023-12-19 RX ADMIN — DULOXETINE HYDROCHLORIDE 60 MG: 60 CAPSULE, DELAYED RELEASE ORAL at 21:40

## 2023-12-19 RX ADMIN — MUPIROCIN 1 APPLICATION: 20 OINTMENT TOPICAL at 08:16

## 2023-12-19 RX ADMIN — FOLIC ACID 1 MG: 1 TABLET ORAL at 08:16

## 2023-12-19 RX ADMIN — ZINC OXIDE 1 APPLICATION: 200 OINTMENT TOPICAL at 21:41

## 2023-12-19 RX ADMIN — FERROUS SULFATE TAB 325 MG (65 MG ELEMENTAL FE) 325 MG: 325 (65 FE) TAB at 17:34

## 2023-12-19 RX ADMIN — INSULIN LISPRO 2 UNITS: 100 INJECTION, SOLUTION INTRAVENOUS; SUBCUTANEOUS at 21:40

## 2023-12-19 RX ADMIN — FINASTERIDE 5 MG: 5 TABLET, FILM COATED ORAL at 08:16

## 2023-12-19 RX ADMIN — INSULIN LISPRO 2 UNITS: 100 INJECTION, SOLUTION INTRAVENOUS; SUBCUTANEOUS at 17:33

## 2023-12-19 RX ADMIN — Medication 2000 UNITS: at 08:12

## 2023-12-19 RX ADMIN — INSULIN LISPRO 8 UNITS: 100 INJECTION, SOLUTION INTRAVENOUS; SUBCUTANEOUS at 12:09

## 2023-12-19 RX ADMIN — ATORVASTATIN CALCIUM 20 MG: 20 TABLET, FILM COATED ORAL at 21:39

## 2023-12-19 RX ADMIN — DULOXETINE HYDROCHLORIDE 60 MG: 60 CAPSULE, DELAYED RELEASE ORAL at 08:15

## 2023-12-19 RX ADMIN — SENNOSIDES AND DOCUSATE SODIUM 2 TABLET: 50; 8.6 TABLET ORAL at 08:12

## 2023-12-19 RX ADMIN — HYDROCODONE BITARTRATE AND ACETAMINOPHEN 1 TABLET: 7.5; 325 TABLET ORAL at 09:15

## 2023-12-19 RX ADMIN — MAGNESIUM OXIDE 400 MG (241.3 MG MAGNESIUM) TABLET 400 MG: TABLET at 08:16

## 2023-12-19 RX ADMIN — CASTOR OIL AND BALSAM, PERU 1 APPLICATION: 788; 87 OINTMENT TOPICAL at 21:41

## 2023-12-19 RX ADMIN — Medication 10 ML: at 21:00

## 2023-12-19 RX ADMIN — NIFEDIPINE 30 MG: 30 TABLET, FILM COATED, EXTENDED RELEASE ORAL at 06:27

## 2023-12-19 RX ADMIN — Medication 10 ML: at 08:17

## 2023-12-19 RX ADMIN — MUPIROCIN 1 APPLICATION: 20 OINTMENT TOPICAL at 21:41

## 2023-12-19 RX ADMIN — Medication 500 MG: at 08:17

## 2023-12-19 RX ADMIN — APIXABAN 5 MG: 5 TABLET, FILM COATED ORAL at 21:40

## 2023-12-19 RX ADMIN — INSULIN GLARGINE 50 UNITS: 100 INJECTION, SOLUTION SUBCUTANEOUS at 08:13

## 2023-12-19 RX ADMIN — CASTOR OIL AND BALSAM, PERU 1 APPLICATION: 788; 87 OINTMENT TOPICAL at 08:12

## 2023-12-19 RX ADMIN — APIXABAN 5 MG: 5 TABLET, FILM COATED ORAL at 08:13

## 2023-12-19 RX ADMIN — MONTELUKAST SODIUM 10 MG: 10 TABLET, FILM COATED ORAL at 21:39

## 2023-12-19 RX ADMIN — FAMOTIDINE 40 MG: 20 TABLET ORAL at 06:27

## 2023-12-19 RX ADMIN — HYDROCODONE BITARTRATE AND ACETAMINOPHEN 1 TABLET: 7.5; 325 TABLET ORAL at 03:14

## 2023-12-19 RX ADMIN — ZINC OXIDE 1 APPLICATION: 200 OINTMENT TOPICAL at 08:16

## 2023-12-19 RX ADMIN — FERROUS SULFATE TAB 325 MG (65 MG ELEMENTAL FE) 325 MG: 325 (65 FE) TAB at 08:12

## 2023-12-19 RX ADMIN — METOPROLOL TARTRATE 100 MG: 50 TABLET, FILM COATED ORAL at 08:12

## 2023-12-19 RX ADMIN — INSULIN LISPRO 2 UNITS: 100 INJECTION, SOLUTION INTRAVENOUS; SUBCUTANEOUS at 12:09

## 2023-12-19 RX ADMIN — INSULIN LISPRO 8 UNITS: 100 INJECTION, SOLUTION INTRAVENOUS; SUBCUTANEOUS at 08:14

## 2023-12-19 RX ADMIN — GABAPENTIN 300 MG: 300 CAPSULE ORAL at 21:40

## 2023-12-19 NOTE — PLAN OF CARE
Goal Outcome Evaluation:  Plan of Care Reviewed With: patient        Progress: no change  Outcome Evaluation: vss, norco for pain, bed alarm for safety, lopez cath in place with good urine output, cream and ointment applied to affected area, rt knee immobilizer in place, continue to monitor the pt.

## 2023-12-19 NOTE — PROGRESS NOTES
Name: Preston Wallis ADMIT: 2023   : 1965  PCP: Kimmy Riley MD    MRN: [id number] LOS: 0 days   AGE/SEX: 58 y.o. male  ROOM: Counts include 234 beds at the [facility]     Subjective   Subjective   Patient is seen at bedside, no new complaints.       Objective   Objective   Vital Signs  Temp:  [97.3 °F (36.3 °C)-98.4 °F (36.9 °C)] 97.3 °F (36.3 °C)  Heart Rate:  [72-76] 73  Resp:  [16-24] 20  BP: (132-171)/(69-79) 167/75  SpO2:  [92 %-100 %] 98 %  on  Flow (L/min):  [1-2] 1.5;   Device (Oxygen Therapy): nasal cannula  Body mass index is 41.35 kg/m².  Physical Exam  General, awake and alert.  Head and ENT, normocephalic and atraumatic.  Lungs, symmetric expansion, equal air entry bilaterally.  Heart, regular rate and rhythm.  Abdomen, soft and nontender.  Extremities, no clubbing or cyanosis.  Neuro, no focal deficits.  Skin: Warm and no rash.  Psych, normal mood and affect.  Musculoskeletal, joint examination is grossly normal.      Results Review     I reviewed the patient's new clinical results.  Results from last 7 days   Lab Units 23  0451 23  0402 12/15/23  0503 23  1556 23  0643   WBC 10*3/mm3  --  10.86* 13.25* 12.37* 13.47*   HEMOGLOBIN g/dL 8.3* 7.3* 7.8* 9.6* 8.5*   PLATELETS 10*3/mm3  --  308 365 414 408     Results from last 7 days   Lab Units 23  0402 12/15/23  0503 23  1556 23  0643   SODIUM mmol/L 137 140 137 137   POTASSIUM mmol/L 4.2 4.1 4.5 4.1   CHLORIDE mmol/L 104 105 100 101   CO2 mmol/L 25.0 25.0 25.6 27.1   BUN mg/dL 28* 28* 26* 26*   CREATININE mg/dL 1.82* 1.95* 2.08* 1.94*   GLUCOSE mg/dL 261* 145* 157* 184*   EGFR mL/min/1.73 42.5* 39.1* 36.2* 39.4*     Results from last 7 days   Lab Units 23  1556 23  0643 23  0540   ALBUMIN g/dL 3.6 3.3* 3.1*   BILIRUBIN mg/dL <0.2  --   --    ALK PHOS U/L 129*  --   --    AST (SGOT) U/L 17  --   --    ALT (SGPT) U/L 26  --   --      Results from last 7 days   Lab Units 23  0402 12/15/23  0509  12/14/23  1556 12/14/23  0643 12/13/23  0540   CALCIUM mg/dL 8.5* 8.9 9.2 9.2 9.1   ALBUMIN g/dL  --   --  3.6 3.3* 3.1*   MAGNESIUM mg/dL  --   --   --  1.9 1.8   PHOSPHORUS mg/dL  --   --   --  3.8 3.5       Glucose   Date/Time Value Ref Range Status   12/19/2023 1649 188 (H) 70 - 130 mg/dL Final   12/19/2023 1128 187 (H) 70 - 130 mg/dL Final   12/19/2023 0718 140 (H) 70 - 130 mg/dL Final   12/18/2023 2055 158 (H) 70 - 130 mg/dL Final   12/18/2023 1629 148 (H) 70 - 130 mg/dL Final   12/18/2023 1121 137 (H) 70 - 130 mg/dL Final   12/18/2023 0723 85 70 - 130 mg/dL Final       No radiology results for the last day    I have personally reviewed all medications:  Scheduled Medications  apixaban, 5 mg, Oral, Q12H  atorvastatin, 20 mg, Oral, Nightly  budesonide-formoterol, 2 puff, Inhalation, BID - RT  calcium carbonate (oyster shell), 500 mg, Oral, Daily  castor oil-balsam peru, 1 application , Topical, Q12H  cholecalciferol, 2,000 Units, Oral, Daily  DULoxetine, 60 mg, Oral, BID  famotidine, 40 mg, Oral, QAM  ferrous sulfate, 325 mg, Oral, BID With Meals  finasteride, 5 mg, Oral, Daily  folic acid, 1 mg, Oral, Daily  gabapentin, 300 mg, Oral, Q12H  hydrocortisone-bacitracin-zinc oxide-nystatin, 1 application , Topical, Q12H  insulin glargine, 50 Units, Subcutaneous, Daily  insulin lispro, 2-7 Units, Subcutaneous, 4x Daily AC & at Bedtime  insulin lispro, 8 Units, Subcutaneous, TID AC  magnesium oxide, 400 mg, Oral, Daily  metoprolol tartrate, 100 mg, Oral, BID  montelukast, 10 mg, Oral, Nightly  mupirocin, 1 application , Topical, Q12H  NIFEdipine XL, 30 mg, Oral, QAM  O2, 2 L/min, Inhalation, Once  senna-docusate sodium, 2 tablet, Oral, BID  sodium chloride, 10 mL, Intravenous, Q12H    Infusions   Diet  Diet: Cardiac Diets; Healthy Heart (2-3 Na+); Texture: Regular Texture (IDDSI 7); Fluid Consistency: Thin (IDDSI 0)    I have personally reviewed:  [x]  Laboratory   [x]  Microbiology   [x]  Radiology   [x]   EKG/Telemetry  [x]  Cardiology/Vascular   []  Pathology    []  Records       Assessment/Plan     Active Hospital Problems    Diagnosis  POA    **Closed fracture of left tibial plateau [S82.142A]  Yes    Essential hypertension [I10]  Yes    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       58 y.o. male admitted with Closed fracture of left tibial plateau.    Assessment and plan  1.  Closed fracture of left tibia plateau, management per Ortho.    2.  Hypertension, diabetes, COPD, chronic conditions.    3. Paroxysmal atrial fibrillation, patient is currently rate controlled.    4.  Morbid obesity, complicates aspects of care.    5.  CODE STATUS is full code.  Further plans based on hospital course.          Shabbir Peraza MD  [facility]  12/19/23  16:58 EST

## 2023-12-19 NOTE — CASE MANAGEMENT/SOCIAL WORK
Continued Stay Note  [facility]     Patient Name: Preston Wallis  MRN: [id number]  Today's Date: 12/19/2023    Admit Date: 12/14/2023    Plan: home with [facility] ( current patient)- CCP to follow   Discharge Plan       Row Name 12/19/23 1424       Plan    Plan home with [facility] ( current patient)- CCP to follow    Plan Comments Plans for possible OR Friday.  CCP will follow up after surgery. [facility] continues to follow.                   Discharge Codes    No documentation.                 Expected Discharge Date and Time       Expected Discharge Date Expected Discharge Time    Dec 22, 2023               Ermelinda Rios RN

## 2023-12-19 NOTE — PLAN OF CARE
Goal Outcome Evaluation:      Patient is alert x4.  Vitals are stable.  Port to right chest is intact.  Patient is on bedrest. Knee immobilizer worn.  Patient is turn q2 hours.  Heels remain elevated.  Silicone border dressing applied to bony prominences.  Doyle catheter placed related to urinary retention. Order received to hold eliquis for surgery.  Surgery is scheduled for Friday.Teaching provided on blood glucose monitoring.  Will continue to monitor an update accordingly.

## 2023-12-19 NOTE — PLAN OF CARE
Goal Outcome Evaluation:      Patient is alert x4.  Vitals are stable.  Port to right chest is intact.  Patient is on bedrest.  Patient is turn q2 hours.  Heels remain elevated.  Silicone border dressing applied to bony prominences.  Doyle catheter placed related to urinary retention.  Will continue to monitor an update accordingly.

## 2023-12-20 ENCOUNTER — PATIENT OUTREACH (OUTPATIENT)
Dept: CASE MANAGEMENT | Facility: OTHER | Age: 58
End: 2023-12-20
Payer: COMMERCIAL

## 2023-12-20 DIAGNOSIS — S82.142A CLOSED FRACTURE OF LEFT TIBIAL PLATEAU, INITIAL ENCOUNTER: Primary | ICD-10-CM

## 2023-12-20 LAB
ALBUMIN SERPL-MCNC: 2.7 G/DL (ref 3.5–5.2)
ALBUMIN/GLOB SERPL: 0.7 G/DL
ALP SERPL-CCNC: 135 U/L (ref 39–117)
ALT SERPL W P-5'-P-CCNC: 17 U/L (ref 1–41)
ANION GAP SERPL CALCULATED.3IONS-SCNC: 11 MMOL/L (ref 5–15)
AST SERPL-CCNC: 17 U/L (ref 1–40)
BASOPHILS # BLD AUTO: 0.11 10*3/MM3 (ref 0–0.2)
BASOPHILS NFR BLD AUTO: 1 % (ref 0–1.5)
BILIRUB SERPL-MCNC: 0.2 MG/DL (ref 0–1.2)
BUN SERPL-MCNC: 26 MG/DL (ref 6–20)
BUN/CREAT SERPL: 17.6 (ref 7–25)
CALCIUM SPEC-SCNC: 9.4 MG/DL (ref 8.6–10.5)
CHLORIDE SERPL-SCNC: 102 MMOL/L (ref 98–107)
CO2 SERPL-SCNC: 25 MMOL/L (ref 22–29)
CREAT SERPL-MCNC: 1.48 MG/DL (ref 0.76–1.27)
DEPRECATED RDW RBC AUTO: 42.8 FL (ref 37–54)
EGFRCR SERPLBLD CKD-EPI 2021: 54.5 ML/MIN/1.73
EOSINOPHIL # BLD AUTO: 0.52 10*3/MM3 (ref 0–0.4)
EOSINOPHIL NFR BLD AUTO: 4.9 % (ref 0.3–6.2)
ERYTHROCYTE [DISTWIDTH] IN BLOOD BY AUTOMATED COUNT: 13 % (ref 12.3–15.4)
GLOBULIN UR ELPH-MCNC: 3.9 GM/DL
GLUCOSE BLDC GLUCOMTR-MCNC: 123 MG/DL (ref 70–130)
GLUCOSE BLDC GLUCOMTR-MCNC: 149 MG/DL (ref 70–130)
GLUCOSE BLDC GLUCOMTR-MCNC: 154 MG/DL (ref 70–130)
GLUCOSE BLDC GLUCOMTR-MCNC: 189 MG/DL (ref 70–130)
GLUCOSE SERPL-MCNC: 132 MG/DL (ref 65–99)
HCT VFR BLD AUTO: 24.7 % (ref 37.5–51)
HGB BLD-MCNC: 7.9 G/DL (ref 13–17.7)
LYMPHOCYTES # BLD AUTO: 2.03 10*3/MM3 (ref 0.7–3.1)
LYMPHOCYTES NFR BLD AUTO: 19.3 % (ref 19.6–45.3)
MCH RBC QN AUTO: 29 PG (ref 26.6–33)
MCHC RBC AUTO-ENTMCNC: 32 G/DL (ref 31.5–35.7)
MCV RBC AUTO: 90.8 FL (ref 79–97)
MONOCYTES # BLD AUTO: 1.1 10*3/MM3 (ref 0.1–0.9)
MONOCYTES NFR BLD AUTO: 10.5 % (ref 5–12)
NEUTROPHILS NFR BLD AUTO: 6.7 10*3/MM3 (ref 1.7–7)
NEUTROPHILS NFR BLD AUTO: 63.7 % (ref 42.7–76)
PLAT MORPH BLD: NORMAL
PLATELET # BLD AUTO: 375 10*3/MM3 (ref 140–450)
PMV BLD AUTO: 9.5 FL (ref 6–12)
POTASSIUM SERPL-SCNC: 5.1 MMOL/L (ref 3.5–5.2)
PROT SERPL-MCNC: 6.6 G/DL (ref 6–8.5)
RBC # BLD AUTO: 2.72 10*6/MM3 (ref 4.14–5.8)
RBC MORPH BLD: NORMAL
SODIUM SERPL-SCNC: 138 MMOL/L (ref 136–145)
WBC MORPH BLD: NORMAL
WBC NRBC COR # BLD AUTO: 10.52 10*3/MM3 (ref 3.4–10.8)

## 2023-12-20 PROCEDURE — 82948 REAGENT STRIP/BLOOD GLUCOSE: CPT

## 2023-12-20 PROCEDURE — 25010000002 HYDROMORPHONE PER 4 MG: Performed by: INTERNAL MEDICINE

## 2023-12-20 PROCEDURE — 94799 UNLISTED PULMONARY SVC/PX: CPT

## 2023-12-20 PROCEDURE — 63710000001 INSULIN GLARGINE PER 5 UNITS: Performed by: INTERNAL MEDICINE

## 2023-12-20 PROCEDURE — 85007 BL SMEAR W/DIFF WBC COUNT: CPT | Performed by: INTERNAL MEDICINE

## 2023-12-20 PROCEDURE — 63710000001 INSULIN LISPRO (HUMAN) PER 5 UNITS: Performed by: INTERNAL MEDICINE

## 2023-12-20 PROCEDURE — 94664 DEMO&/EVAL PT USE INHALER: CPT

## 2023-12-20 PROCEDURE — 85025 COMPLETE CBC W/AUTO DIFF WBC: CPT | Performed by: INTERNAL MEDICINE

## 2023-12-20 PROCEDURE — 63710000001 INSULIN LISPRO (HUMAN) PER 5 UNITS: Performed by: ORTHOPAEDIC SURGERY

## 2023-12-20 PROCEDURE — G0378 HOSPITAL OBSERVATION PER HR: HCPCS

## 2023-12-20 PROCEDURE — 63710000001 INSULIN GLARGINE PER 5 UNITS: Performed by: ORTHOPAEDIC SURGERY

## 2023-12-20 PROCEDURE — 94761 N-INVAS EAR/PLS OXIMETRY MLT: CPT

## 2023-12-20 PROCEDURE — 80053 COMPREHEN METABOLIC PANEL: CPT | Performed by: INTERNAL MEDICINE

## 2023-12-20 RX ADMIN — MAGNESIUM OXIDE 400 MG (241.3 MG MAGNESIUM) TABLET 400 MG: TABLET at 08:08

## 2023-12-20 RX ADMIN — FINASTERIDE 5 MG: 5 TABLET, FILM COATED ORAL at 08:08

## 2023-12-20 RX ADMIN — HYDROMORPHONE HYDROCHLORIDE 0.5 MG: 1 INJECTION, SOLUTION INTRAMUSCULAR; INTRAVENOUS; SUBCUTANEOUS at 22:17

## 2023-12-20 RX ADMIN — CASTOR OIL AND BALSAM, PERU 1 APPLICATION: 788; 87 OINTMENT TOPICAL at 22:03

## 2023-12-20 RX ADMIN — Medication 10 ML: at 08:09

## 2023-12-20 RX ADMIN — INSULIN LISPRO 2 UNITS: 100 INJECTION, SOLUTION INTRAVENOUS; SUBCUTANEOUS at 22:19

## 2023-12-20 RX ADMIN — DULOXETINE HYDROCHLORIDE 60 MG: 60 CAPSULE, DELAYED RELEASE ORAL at 08:07

## 2023-12-20 RX ADMIN — MONTELUKAST SODIUM 10 MG: 10 TABLET, FILM COATED ORAL at 22:02

## 2023-12-20 RX ADMIN — INSULIN LISPRO 8 UNITS: 100 INJECTION, SOLUTION INTRAVENOUS; SUBCUTANEOUS at 17:27

## 2023-12-20 RX ADMIN — ATORVASTATIN CALCIUM 20 MG: 20 TABLET, FILM COATED ORAL at 22:02

## 2023-12-20 RX ADMIN — MUPIROCIN 1 APPLICATION: 20 OINTMENT TOPICAL at 22:04

## 2023-12-20 RX ADMIN — FAMOTIDINE 40 MG: 20 TABLET ORAL at 06:12

## 2023-12-20 RX ADMIN — ZINC OXIDE 1 APPLICATION: 200 OINTMENT TOPICAL at 22:04

## 2023-12-20 RX ADMIN — INSULIN LISPRO 8 UNITS: 100 INJECTION, SOLUTION INTRAVENOUS; SUBCUTANEOUS at 08:07

## 2023-12-20 RX ADMIN — CASTOR OIL AND BALSAM, PERU 1 APPLICATION: 788; 87 OINTMENT TOPICAL at 08:08

## 2023-12-20 RX ADMIN — Medication 10 ML: at 22:03

## 2023-12-20 RX ADMIN — FERROUS SULFATE TAB 325 MG (65 MG ELEMENTAL FE) 325 MG: 325 (65 FE) TAB at 08:07

## 2023-12-20 RX ADMIN — GABAPENTIN 300 MG: 300 CAPSULE ORAL at 08:07

## 2023-12-20 RX ADMIN — FOLIC ACID 1 MG: 1 TABLET ORAL at 08:08

## 2023-12-20 RX ADMIN — Medication 2000 UNITS: at 08:07

## 2023-12-20 RX ADMIN — FERROUS SULFATE TAB 325 MG (65 MG ELEMENTAL FE) 325 MG: 325 (65 FE) TAB at 17:27

## 2023-12-20 RX ADMIN — INSULIN LISPRO 2 UNITS: 100 INJECTION, SOLUTION INTRAVENOUS; SUBCUTANEOUS at 11:35

## 2023-12-20 RX ADMIN — ZINC OXIDE 1 APPLICATION: 200 OINTMENT TOPICAL at 08:08

## 2023-12-20 RX ADMIN — METOPROLOL TARTRATE 100 MG: 50 TABLET, FILM COATED ORAL at 08:07

## 2023-12-20 RX ADMIN — MUPIROCIN 1 APPLICATION: 20 OINTMENT TOPICAL at 08:08

## 2023-12-20 RX ADMIN — NIFEDIPINE 30 MG: 30 TABLET, FILM COATED, EXTENDED RELEASE ORAL at 06:12

## 2023-12-20 RX ADMIN — GABAPENTIN 300 MG: 300 CAPSULE ORAL at 22:02

## 2023-12-20 RX ADMIN — METOPROLOL TARTRATE 100 MG: 50 TABLET, FILM COATED ORAL at 22:02

## 2023-12-20 RX ADMIN — HYDROCODONE BITARTRATE AND ACETAMINOPHEN 1 TABLET: 7.5; 325 TABLET ORAL at 10:18

## 2023-12-20 RX ADMIN — INSULIN GLARGINE 50 UNITS: 100 INJECTION, SOLUTION SUBCUTANEOUS at 08:07

## 2023-12-20 RX ADMIN — BUDESONIDE AND FORMOTEROL FUMARATE DIHYDRATE 2 PUFF: 160; 4.5 AEROSOL RESPIRATORY (INHALATION) at 19:30

## 2023-12-20 RX ADMIN — INSULIN LISPRO 8 UNITS: 100 INJECTION, SOLUTION INTRAVENOUS; SUBCUTANEOUS at 11:35

## 2023-12-20 RX ADMIN — BUDESONIDE AND FORMOTEROL FUMARATE DIHYDRATE 2 PUFF: 160; 4.5 AEROSOL RESPIRATORY (INHALATION) at 08:34

## 2023-12-20 RX ADMIN — DULOXETINE HYDROCHLORIDE 60 MG: 60 CAPSULE, DELAYED RELEASE ORAL at 22:16

## 2023-12-20 RX ADMIN — Medication 500 MG: at 08:09

## 2023-12-20 NOTE — PAYOR COMM NOTE
"Bimal Preston \"Gómez\" (58 y.o. Male)      PATIENT DISCHARGED 12/14/23: REF#  [id number]      DEPT: [phone],  [phone]    [facility]:           Date of Birth   1965    Social Security Number       Address   [address] [place] [address]    Home Phone       MRN   [id number]       [place]   [employer]    Marital Status                               Admission Date   12/6/23    Admission Type   Urgent    Admitting Provider   Iam Eller MD    Attending Provider       Department, Room/Bed   [facility], S620/1       Discharge Date   12/14/2023    Discharge Disposition   Home or Self Care    Discharge Destination                                 Attending Provider: (none)   Allergies: Benadryl [Diphenhydramine], Proventil [Albuterol]    Isolation: None   Infection: MRSA/History Only (12/15/23)   Code Status: CPR    Ht: 175.3 cm (69\")   Wt: 126 kg (277 lb 12.5 oz)    Admission Cmt: None   Principal Problem: Bacterial pneumonia [J15.9]                   Active Insurance as of 12/6/2023       Primary Coverage       Payor Plan Insurance Group Employer/Plan Group    [facility] BY HE [facility] BY HE [id number]       Payor Plan Address Payor Plan Phone Number Payor Plan Fax Number Effective Dates    [address] [address]   7/1/2022 - None Entered    [facility] [id number]         Subscriber Name Subscriber Birth Date Member ID       PRESTON WALLIS 1965 [id number]                     Emergency Contacts        (Rel.) Home Phone Work Phone Mobile Phone    Kaim Wallis (Spouse) [phone]    Elaine Zaldivar (Daughter) -- -- [phone]              [place]: NPI [id number]  Tax ID [id number]           Discharge Summary        Deshawn Moser MD at 12/14/23 1155              Date of Admission: 12/6/2023  Date of Discharge:  12/14/2023  Primary Care Physician: Kimmy Riley MD "     Discharge Diagnosis:  Active Hospital Problems    Diagnosis  POA    **Bacterial pneumonia [J15.9]  Yes    Bronchiectasis [J47.9]  Yes    Anemia [D64.9]  Yes    Sepsis [A41.9]  Yes    Essential hypertension [I10]  Yes    Chronic obstructive pulmonary disease [J44.9]  Yes    Stage 3b chronic kidney disease [N18.32]  Yes    Neurogenic bladder [N31.9]  Yes    Hyperlipidemia [E78.5]  Yes    Paroxysmal atrial fibrillation [I48.0]  Yes    Obstructive sleep apnea [G47.33]  Yes    Chronic diastolic heart failure [I50.32]  Yes    ERIC (acute kidney injury) [N17.9]  Yes      Resolved Hospital Problems   No resolved problems to display.       Presenting Problem/History of Present Illness from H&P:  Bacterial pneumonia [J15.9]     Mr. Wallis is a 58 y.o. former smoker with a history of COPD, chronic diastolic CHF, pseudomonas pneumonia, MRSA pneumonia, chronic respiratory failure with hypoxia, chronic bronchiectasis, type 2 diabetes mellitus, hypertension, hyperlipidemia, paroxysmal atrial fibrillation, and MILADY  that presents to [facility] complaining of shortness of breath.  He was transferred from [facility] following sputum cultures that grew achromobacter xylosoxidans.  He initially presented to [facility] on 11/28/2023 and a CT showed evidence of right lower lobe pneumonia.  A respiratory viral panel was negative and a MRSA swab was positive.  He was started on Vancomycin and Cefepime.  Once the sputum cultures resulted on 12/3/2023, he was started on Bactrim and Zosyn.  He showed evidence of worsening pneumonia after a few days and his oxygen requirements increased to 4L NC from his baseline of 2L NC. He also had persistent leukocytosis. The Bactrim was discontinued and he was started on Zyvox for MRSA pneumonia.  He was also thought to be having a bronchiectasis exacerbation.  His kidney function has also worsened and he was transferred to [facility] for evaluation by  infectious disease and nephrology.     Hospital Course:  The patient is a 58 y.o. male who was transferred here after initial hospitalization and Flagyl day where he was treated for MRSA and Achromobacter pneumonia.  Patient also had abdominal wall cellulitis/abscess and had incision and drainage at [facility].  He subsequently had bleeding from that site so his Eliquis was held.  Upon arrival here he was evaluated by infectious disease, pulmonology, nephrology services.  He has had improvement in his respiratory status and will complete his antibiotic course today prior to discharge.  He is going to follow-up with pulmonology as an outpatient.    Patient has had hyperglycemia and also had frequent snacking while here.  His A1c is 8.2.  He has had increased insulin requirements here.  He is going to discharge on his home regimen but he did report that he has had elevated blood glucose fairly consistently at home.  Increasing his Lantus to 50 units.    Patient has paroxysmal atrial fibrillation and his anemia has stabilized.  He has tolerated prophylactic dose of heparin.  The I&D was over a week ago.  He can resume Eliquis at discharge and should follow-up with the surgeon from [facility].    Patient has neurogenic bladder and chronic intermittent catheterization at home.    His creatinine has been improving and is near baseline.  He has been off of Bumex while here and is not acutely volume overloaded.  Plan to have him follow-up with his home cardiologist to discuss when to resume Bumex and monitoring.    Exam Today:  Constitutional:       General: He is not in acute distress.     Appearance: He is obese. He is ill-appearing (chronically). He is not diaphoretic.   HENT:      Head: Atraumatic.   Eyes:      Conjunctiva/sclera: Conjunctivae normal.   Cardiovascular:      Rate and Rhythm: Normal rate and regular rhythm.      Pulses: Normal pulses.   Pulmonary:      Effort: Pulmonary effort is normal.      Breath sounds: No  wheezing or rales.   Abdominal:      General: There is no distension.      Palpations: Abdomen is soft.      Tenderness: There is no abdominal tenderness. There is no guarding or rebound.   Musculoskeletal:         General: Swelling present. No tenderness.   Skin:     General: Skin is warm and dry.   Neurological:      Mental Status: He is alert. Mental status is at baseline.   Psychiatric:         Mood and Affect: Mood normal.         Behavior: Behavior normal.     Results:  CXR  Unchanged right subclavian port with tip overlying the mid  SVC. Overlapping central venous tubing extending from the left  subclavian vein to the mid SVC, possibly abandoned. Patient is rotated.  Unchanged at least partial right upper lobe atelectasis and bilateral,  right greater than left parenchymal opacities. No pleural effusion or  pneumothorax.    CXR  Unchanged at least partial right upper lobe collapse with  right-sided volume loss and rightward mediastinal shift. Associated  widening of the right paratracheal stripe. Very similar bilateral right  greater than left parenchymal opacities. Right lower lobe opacity  silhouettes the shifted right heart border. Consider updated chest CT.  Right subclavian port with tip overlying the mid SVC. Overlapping left  subclavian central venous catheter tubing. No pleural effusion or  pneumothorax. Stable normal size rightward shifted cardiomediastinal  silhouette.    CT Chest  1. There is extensive cystic bronchiectasis at the perihilar regions of  all lobes, and the ectatic bronchi are thickened. There are  reticulonodular opacities scattered throughout the right lung and to a  lesser degree at the left lower lobe. Some of these opacities have  ill-defined margins and are suspected to represent acute bronchiolitis.  There is also ill-defined ground-glass density at the right lower lobe  which is suspected to represent infectious infiltrate as well and the  appearance can be seen with atypical  pneumonia.     2. There is bandlike atelectasis/scarring at the right lower lobe. There  are no pleural or pericardial effusions. There is no lymphadenopathy  within the chest. At the visualized upper abdomen, the stomach is nearly  filled with food debris and fluid.    Procedures Performed:         Consults:   Consults       Date and Time Order Name Status Description    12/7/2023  7:18 AM Inpatient Pulmonology Consult Completed     12/6/2023 10:17 PM Inpatient Pulmonology Consult      12/6/2023 10:17 PM Inpatient Nephrology Consult Completed     12/6/2023  8:36 PM Inpatient Infectious Diseases Consult Completed              Discharge Disposition:  Home or Self Care    Discharge Medications:     Discharge Medications        Changes to Medications        Instructions Start Date   apixaban 5 MG tablet tablet  Commonly known as: Eliquis  What changed: additional instructions   5 mg, Oral, Every 12 Hours      Lantus SoloStar 100 UNIT/ML injection pen  Generic drug: Insulin Glargine  What changed: See the new instructions.   50 Units, Subcutaneous, Daily      NIFEdipine XL 30 MG 24 hr tablet  Commonly known as: PROCARDIA XL  What changed: additional instructions   30 mg, Oral, Every Morning             Continue These Medications        Instructions Start Date   atorvastatin 20 MG tablet  Commonly known as: LIPITOR   20 mg, Oral, Nightly      B-D UF III MINI PEN NEEDLES 31G X 5 MM misc  Generic drug: Insulin Pen Needle   USE FOUR TIMES DAILY BEFORE MEALS AND AT BEDTIME      Breztri Aerosphere 160-9-4.8 MCG/ACT aerosol inhaler  Generic drug: Budeson-Glycopyrrol-Formoterol   No dose, route, or frequency recorded.      Bydureon BCise 2 MG/0.85ML auto-injector injection  Generic drug: exenatide er   2 mg, Subcutaneous, Weekly      calcium citrate 950 (200 Ca) MG tablet  Commonly known as: CALCITRATE   950 mg, Oral, Daily      docusate sodium 100 MG capsule  Commonly known as: COLACE   100 mg, Oral, Daily      DULoxetine 60  MG capsule  Commonly known as: CYMBALTA   60 mg, Oral, 2 Times Daily      famotidine 40 MG tablet  Commonly known as: PEPCID   40 mg, Oral, Every Morning      Farxiga 5 MG tablet tablet  Generic drug: dapagliflozin   5 mg, Oral, Daily      ferrous gluconate 324 MG tablet  Commonly known as: FERGON   324 mg, Oral, Daily With Breakfast      finasteride 5 MG tablet  Commonly known as: PROSCAR   5 mg, Oral, Daily      Fluticasone-Salmeterol 500-50 MCG/ACT DISKUS  Commonly known as: ADVAIR/WIXELA   1 puff, Inhalation, 2 Times Daily - RT      folic acid 1 MG tablet  Commonly known as: FOLVITE   1 mg, Oral, Daily      FreeStyle Bethany 2 Gordo device   No dose, route, or frequency recorded.      FreeStyle Bethany 2 Sensor misc   USE every 14 DAYS      gabapentin 300 MG capsule  Commonly known as: NEURONTIN   TAKE ONE CAPSULE BY MOUTH EVERY MORNING AND TAKE TWO CAPSULES BY MOUTH EVERY NIGHT AT BEDTIME      glucose blood test strip   1 each, Other, Daily, Dx:   E11.40      Insulin Lispro (1 Unit Dial) 100 UNIT/ML solution pen-injector  Commonly known as: HUMALOG   inject EIGHT units UNDER THE SKIN INTO THE APPROPRIATE AREA THREE TIMES DAILY PER sliding scale, IF BLOOD SUGAR < 160= 0 units, 161-220= 2 units, 221-280= 4 units, 281-340 = SIX units, 341-400 = EIGHT units      magnesium oxide 400 tablet tablet  Commonly known as: MAG-OX   400 mg, Oral, Daily, Started by Flaget       metoprolol tartrate 100 MG tablet  Commonly known as: LOPRESSOR   100 mg, Oral, 2 Times Daily      montelukast 10 MG tablet  Commonly known as: SINGULAIR   10 mg, Oral, Every Night at Bedtime      O2  Commonly known as: OXYGEN   2 Liter O2 - CONTINUOUS (route: Oxygen)      ondansetron ODT 4 MG disintegrating tablet  Commonly known as: ZOFRAN-ODT   PLACE 1 TABLET ON THE TONGUE EVERY 8 HOURS AS NEEDED FOR NAUSEA AND VOMITING      Ozempic (1 MG/DOSE) 4 MG/3ML solution pen-injector  Generic drug: Semaglutide (1 MG/DOSE)   1 mg, Subcutaneous, Weekly       TRUEplus Lancets 28G misc   USE AS DIRECTED      Ventolin  (90 Base) MCG/ACT inhaler  Generic drug: albuterol sulfate HFA   INHALE 2 PUFFS FOUR TIMES DAILY AS NEEDED FOR WHEEZING      Vitamin D3 50 MCG (2000 UT) tablet   50 mcg, Oral, Daily             Stop These Medications      bumetanide 2 MG tablet  Commonly known as: BUMEX     losartan 25 MG tablet  Commonly known as: COZAAR              Discharge Diet:   Diet Instructions       Diet: Cardiac Diets, Diabetic Diets; Healthy Heart (2-3 Na+); Thin (IDDSI 0); Consistent Carbohydrate      Discharge Diet:  Cardiac Diets  Diabetic Diets       Cardiac Diet: Healthy Heart (2-3 Na+)    Fluid Consistency: Thin (IDDSI 0)    Diabetic Diet: Consistent Carbohydrate            Activity at Discharge:   Activity Instructions       Activity as Tolerated              Follow-up Appointments:  Additional Instructions for the Follow-ups that You Need to Schedule       Discharge Follow-up with Specialty: home cardiologist   As directed      Specialty: home cardiologist   Follow Up Details: call to discuss when/if to resume bumex and monitoring with them               Contact information for follow-up providers       Kimmy Riley MD .    Specialty: Family Medicine  Contact information:  [address] [facility]  [address]  [facility] [address]  [phone]               Kelly Mccarthy MD Follow up.    Specialty: Nephrology  Contact information:  [address]  [address]  [facility] [address]  [phone]                       Contact information for after-discharge care       Home Medical Care       [facility] .    Service: Home Rehabilitation  Contact information:  [address] [facility], [address]  [facility] [id number] [phone]                                   Test Results Pending at Discharge:       Deshawn Moser MD  12/14/23  11:56 EST    Time Spent on Discharge Activities: >30 minutes    Dictated portions using Dragon  dictation software.    Electronically signed by Deshawn Moser MD at 12/14/23 1203       Discharge Order (From admission, onward)       Start     Ordered    12/14/23 1149  Discharge patient  Once        Expected Discharge Date: 12/14/23   Discharge Disposition: Home or Self Care   Physician of Record for Attribution - Please select from Treatment Team: DESHAWN MOSER [[id number]]   Review needed by CMO to determine Physician of Record: No      Question Answer Comment   Physician of Record for Attribution - Please select from Treatment Team DESHAWN MOSER    Review needed by CMO to determine Physician of Record No        12/14/23 1145

## 2023-12-20 NOTE — PLAN OF CARE
Goal Outcome Evaluation:           Progress: no change          Pt re admitted on 12/14. Pt was discharged and fell getting into the car. Pt has a left tibia fracture. Pt continues with the KI. Pt has been sleeping between care for most of the shift. Pt plans for surgery on Fri. Treatments done. Pt has a port to the rt upper chest that has been accessed. Uses home O2. Pt resting at this time, will continue to monitor.

## 2023-12-20 NOTE — OUTREACH NOTE
AMBULATORY CASE MANAGEMENT NOTE    Name and Relationship of Patient/Support Person: Kami Wallis - Emergency Contact    Elizabeth called to report that Gómez is having surgery on Friday for his legs. Will keep close check on his chart.  Recommended very highly that he go to rehab and not go home for PT/she would like [facility] if possible, not local.     Education Documentation  No documentation found.        Tara GOMEZ  Ambulatory Case Management    12/20/2023, 14:09 EST

## 2023-12-20 NOTE — PLAN OF CARE
Goal Outcome Evaluation:   Patient is scheduled for a left tibia ORIF on Friday, 12/22. Aox4. VSS on 2 L NC. Strict bedrest. Knee immobilizer in place. BM today. Right sided chest port, CHG daily. Doyle catheter. Diabetic on SSI. Wound care provided and dressings changed per order. Pain is adequately controlled. Will continue to monitor.

## 2023-12-20 NOTE — PROGRESS NOTES
Name: Preston Wallis ADMIT: 2023   : 1965  PCP: Kimmy Riley MD    MRN: [id number] LOS: 0 days   AGE/SEX: 58 y.o. male  ROOM: [facility]     Subjective   Subjective   Patient seen at bedside.       Objective   Objective   Vital Signs  Temp:  [97.4 °F (36.3 °C)-97.9 °F (36.6 °C)] 97.4 °F (36.3 °C)  Heart Rate:  [75-78] 77  Resp:  [16-20] 18  BP: (150-164)/(73-80) 157/77  SpO2:  [91 %-100 %] 97 %  on  Flow (L/min):  [1.5-2] 1.5;   Device (Oxygen Therapy): nasal cannula  Body mass index is 41.35 kg/m².  Physical Exam  General, awake and alert.  Head and ENT, normocephalic and atraumatic.  Lungs, symmetric expansion, equal air entry bilaterally.  Heart, regular rate and rhythm.  Abdomen, soft and nontender.  Extremities, no clubbing or cyanosis.  Neuro, no focal deficits.  Skin: Warm and no rash.  Psych, normal mood and affect.  Musculoskeletal, joint examination is grossly normal.          Copied text material from yesterday's note has been reviewed for appropriate changes and remains accurate as of 23.          Results Review     I reviewed the patient's new clinical results.  Results from last 7 days   Lab Units 23  0516 23  0451 23  0402 12/15/23  0503 23  1556   WBC 10*3/mm3 10.52  --  10.86* 13.25* 12.37*   HEMOGLOBIN g/dL 7.9* 8.3* 7.3* 7.8* 9.6*   PLATELETS 10*3/mm3 375  --  308 365 414     Results from last 7 days   Lab Units 23  0516 23  0402 12/15/23  0503 23  1556   SODIUM mmol/L 138 137 140 137   POTASSIUM mmol/L 5.1 4.2 4.1 4.5   CHLORIDE mmol/L 102 104 105 100   CO2 mmol/L 25.0 25.0 25.0 25.6   BUN mg/dL 26* 28* 28* 26*   CREATININE mg/dL 1.48* 1.82* 1.95* 2.08*   GLUCOSE mg/dL 132* 261* 145* 157*   EGFR mL/min/1.73 54.5* 42.5* 39.1* 36.2*     Results from last 7 days   Lab Units 23  0516 23  1556 23  0643   ALBUMIN g/dL 2.7* 3.6 3.3*   BILIRUBIN mg/dL 0.2 <0.2  --    ALK PHOS U/L 135* 129*  --    AST (SGOT) U/L 17 17   --    ALT (SGPT) U/L 17 26  --      Results from last 7 days   Lab Units 12/20/23  0516 12/16/23  0402 12/15/23  0503 12/14/23  1556 12/14/23  0643   CALCIUM mg/dL 9.4 8.5* 8.9 9.2 9.2   ALBUMIN g/dL 2.7*  --   --  3.6 3.3*   MAGNESIUM mg/dL  --   --   --   --  1.9   PHOSPHORUS mg/dL  --   --   --   --  3.8       Glucose   Date/Time Value Ref Range Status   12/20/2023 1629 123 70 - 130 mg/dL Final   12/20/2023 1123 154 (H) 70 - 130 mg/dL Final   12/20/2023 0709 149 (H) 70 - 130 mg/dL Final   12/19/2023 2029 187 (H) 70 - 130 mg/dL Final   12/19/2023 1649 188 (H) 70 - 130 mg/dL Final   12/19/2023 1128 187 (H) 70 - 130 mg/dL Final   12/19/2023 0718 140 (H) 70 - 130 mg/dL Final       No radiology results for the last day    I have personally reviewed all medications:  Scheduled Medications  [Held by provider] apixaban, 5 mg, Oral, Q12H  atorvastatin, 20 mg, Oral, Nightly  budesonide-formoterol, 2 puff, Inhalation, BID - RT  calcium carbonate (oyster shell), 500 mg, Oral, Daily  castor oil-balsam peru, 1 application , Topical, Q12H  cholecalciferol, 2,000 Units, Oral, Daily  DULoxetine, 60 mg, Oral, BID  famotidine, 40 mg, Oral, QAM  ferrous sulfate, 325 mg, Oral, BID With Meals  finasteride, 5 mg, Oral, Daily  folic acid, 1 mg, Oral, Daily  gabapentin, 300 mg, Oral, Q12H  hydrocortisone-bacitracin-zinc oxide-nystatin, 1 application , Topical, Q12H  insulin glargine, 50 Units, Subcutaneous, Daily  insulin lispro, 2-7 Units, Subcutaneous, 4x Daily AC & at Bedtime  insulin lispro, 8 Units, Subcutaneous, TID AC  magnesium oxide, 400 mg, Oral, Daily  metoprolol tartrate, 100 mg, Oral, BID  montelukast, 10 mg, Oral, Nightly  mupirocin, 1 application , Topical, Q12H  NIFEdipine XL, 30 mg, Oral, QAM  O2, 2 L/min, Inhalation, Once  senna-docusate sodium, 2 tablet, Oral, BID  sodium chloride, 10 mL, Intravenous, Q12H    Infusions   Diet  Diet: Cardiac Diets; Healthy Heart (2-3 Na+); Texture: Regular Texture (IDDSI 7); Fluid  Consistency: Thin (IDDSI 0)    I have personally reviewed:  [x]  Laboratory   [x]  Microbiology   [x]  Radiology   [x]  EKG/Telemetry  [x]  Cardiology/Vascular   []  Pathology    []  Records       Assessment/Plan     Active Hospital Problems    Diagnosis  POA    **Closed fracture of left tibial plateau [S82.142A]  Yes    Essential hypertension [I10]  Yes    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       58 y.o. male admitted with Closed fracture of left tibial plateau.    Assessment and plan  1.  Closed fracture of left tibia plateau, management per Ortho.  Continue pain control and physical therapy.     2.  Hypertension, diabetes, COPD, chronic conditions.     3. Paroxysmal atrial fibrillation, patient is currently rate controlled.     4.  Morbid obesity, complicates aspects of care.     5.  CODE STATUS is full code.  Further plans based on hospital course.      Shabbir Peraza MD  [facility]  12/20/23  17:44 EST

## 2023-12-21 LAB
ALBUMIN SERPL-MCNC: 3 G/DL (ref 3.5–5.2)
ALBUMIN/GLOB SERPL: 0.9 G/DL
ALP SERPL-CCNC: 145 U/L (ref 39–117)
ALT SERPL W P-5'-P-CCNC: 11 U/L (ref 1–41)
ANION GAP SERPL CALCULATED.3IONS-SCNC: 8.5 MMOL/L (ref 5–15)
AST SERPL-CCNC: 16 U/L (ref 1–40)
BASOPHILS # BLD AUTO: 0.09 10*3/MM3 (ref 0–0.2)
BASOPHILS NFR BLD AUTO: 0.9 % (ref 0–1.5)
BILIRUB SERPL-MCNC: 0.2 MG/DL (ref 0–1.2)
BUN SERPL-MCNC: 31 MG/DL (ref 6–20)
BUN/CREAT SERPL: 17.8 (ref 7–25)
CALCIUM SPEC-SCNC: 9.1 MG/DL (ref 8.6–10.5)
CHLORIDE SERPL-SCNC: 99 MMOL/L (ref 98–107)
CO2 SERPL-SCNC: 28.5 MMOL/L (ref 22–29)
CREAT SERPL-MCNC: 1.74 MG/DL (ref 0.76–1.27)
DEPRECATED RDW RBC AUTO: 42.6 FL (ref 37–54)
EGFRCR SERPLBLD CKD-EPI 2021: 44.9 ML/MIN/1.73
EOSINOPHIL # BLD AUTO: 0.47 10*3/MM3 (ref 0–0.4)
EOSINOPHIL NFR BLD AUTO: 4.6 % (ref 0.3–6.2)
ERYTHROCYTE [DISTWIDTH] IN BLOOD BY AUTOMATED COUNT: 13.1 % (ref 12.3–15.4)
GLOBULIN UR ELPH-MCNC: 3.5 GM/DL
GLUCOSE BLDC GLUCOMTR-MCNC: 115 MG/DL (ref 70–130)
GLUCOSE BLDC GLUCOMTR-MCNC: 156 MG/DL (ref 70–130)
GLUCOSE BLDC GLUCOMTR-MCNC: 157 MG/DL (ref 70–130)
GLUCOSE BLDC GLUCOMTR-MCNC: 168 MG/DL (ref 70–130)
GLUCOSE SERPL-MCNC: 170 MG/DL (ref 65–99)
HCT VFR BLD AUTO: 25.4 % (ref 37.5–51)
HGB BLD-MCNC: 8.1 G/DL (ref 13–17.7)
IMM GRANULOCYTES # BLD AUTO: 0.05 10*3/MM3 (ref 0–0.05)
IMM GRANULOCYTES NFR BLD AUTO: 0.5 % (ref 0–0.5)
LYMPHOCYTES # BLD AUTO: 2.48 10*3/MM3 (ref 0.7–3.1)
LYMPHOCYTES NFR BLD AUTO: 24.3 % (ref 19.6–45.3)
MCH RBC QN AUTO: 28.7 PG (ref 26.6–33)
MCHC RBC AUTO-ENTMCNC: 31.9 G/DL (ref 31.5–35.7)
MCV RBC AUTO: 90.1 FL (ref 79–97)
MONOCYTES # BLD AUTO: 1.15 10*3/MM3 (ref 0.1–0.9)
MONOCYTES NFR BLD AUTO: 11.3 % (ref 5–12)
NEUTROPHILS NFR BLD AUTO: 5.98 10*3/MM3 (ref 1.7–7)
NEUTROPHILS NFR BLD AUTO: 58.4 % (ref 42.7–76)
NRBC BLD AUTO-RTO: 0 /100 WBC (ref 0–0.2)
PLATELET # BLD AUTO: 430 10*3/MM3 (ref 140–450)
PMV BLD AUTO: 8.9 FL (ref 6–12)
POTASSIUM SERPL-SCNC: 4.8 MMOL/L (ref 3.5–5.2)
PROT SERPL-MCNC: 6.5 G/DL (ref 6–8.5)
RBC # BLD AUTO: 2.82 10*6/MM3 (ref 4.14–5.8)
SODIUM SERPL-SCNC: 136 MMOL/L (ref 136–145)
WBC NRBC COR # BLD AUTO: 10.22 10*3/MM3 (ref 3.4–10.8)

## 2023-12-21 PROCEDURE — 63710000001 INSULIN GLARGINE PER 5 UNITS: Performed by: ORTHOPAEDIC SURGERY

## 2023-12-21 PROCEDURE — 94761 N-INVAS EAR/PLS OXIMETRY MLT: CPT

## 2023-12-21 PROCEDURE — 94799 UNLISTED PULMONARY SVC/PX: CPT

## 2023-12-21 PROCEDURE — 94760 N-INVAS EAR/PLS OXIMETRY 1: CPT

## 2023-12-21 PROCEDURE — 80053 COMPREHEN METABOLIC PANEL: CPT | Performed by: INTERNAL MEDICINE

## 2023-12-21 PROCEDURE — 63710000001 INSULIN GLARGINE PER 5 UNITS: Performed by: INTERNAL MEDICINE

## 2023-12-21 PROCEDURE — 82948 REAGENT STRIP/BLOOD GLUCOSE: CPT

## 2023-12-21 PROCEDURE — 94664 DEMO&/EVAL PT USE INHALER: CPT

## 2023-12-21 PROCEDURE — 63710000001 INSULIN LISPRO (HUMAN) PER 5 UNITS: Performed by: INTERNAL MEDICINE

## 2023-12-21 PROCEDURE — 85025 COMPLETE CBC W/AUTO DIFF WBC: CPT | Performed by: INTERNAL MEDICINE

## 2023-12-21 PROCEDURE — 63710000001 INSULIN LISPRO (HUMAN) PER 5 UNITS: Performed by: ORTHOPAEDIC SURGERY

## 2023-12-21 RX ADMIN — Medication 10 ML: at 20:51

## 2023-12-21 RX ADMIN — FAMOTIDINE 40 MG: 20 TABLET ORAL at 06:13

## 2023-12-21 RX ADMIN — Medication 2000 UNITS: at 08:05

## 2023-12-21 RX ADMIN — INSULIN LISPRO 2 UNITS: 100 INJECTION, SOLUTION INTRAVENOUS; SUBCUTANEOUS at 08:06

## 2023-12-21 RX ADMIN — MAGNESIUM OXIDE 400 MG (241.3 MG MAGNESIUM) TABLET 400 MG: TABLET at 08:05

## 2023-12-21 RX ADMIN — MUPIROCIN 1 APPLICATION: 20 OINTMENT TOPICAL at 08:06

## 2023-12-21 RX ADMIN — INSULIN LISPRO 2 UNITS: 100 INJECTION, SOLUTION INTRAVENOUS; SUBCUTANEOUS at 12:04

## 2023-12-21 RX ADMIN — INSULIN LISPRO 8 UNITS: 100 INJECTION, SOLUTION INTRAVENOUS; SUBCUTANEOUS at 12:03

## 2023-12-21 RX ADMIN — DULOXETINE HYDROCHLORIDE 60 MG: 60 CAPSULE, DELAYED RELEASE ORAL at 20:50

## 2023-12-21 RX ADMIN — Medication 500 MG: at 08:05

## 2023-12-21 RX ADMIN — FERROUS SULFATE TAB 325 MG (65 MG ELEMENTAL FE) 325 MG: 325 (65 FE) TAB at 17:08

## 2023-12-21 RX ADMIN — DULOXETINE HYDROCHLORIDE 60 MG: 60 CAPSULE, DELAYED RELEASE ORAL at 08:05

## 2023-12-21 RX ADMIN — ZINC OXIDE 1 APPLICATION: 200 OINTMENT TOPICAL at 08:06

## 2023-12-21 RX ADMIN — BUDESONIDE AND FORMOTEROL FUMARATE DIHYDRATE 2 PUFF: 160; 4.5 AEROSOL RESPIRATORY (INHALATION) at 07:05

## 2023-12-21 RX ADMIN — GABAPENTIN 300 MG: 300 CAPSULE ORAL at 20:50

## 2023-12-21 RX ADMIN — ATORVASTATIN CALCIUM 20 MG: 20 TABLET, FILM COATED ORAL at 20:50

## 2023-12-21 RX ADMIN — MONTELUKAST SODIUM 10 MG: 10 TABLET, FILM COATED ORAL at 20:50

## 2023-12-21 RX ADMIN — FINASTERIDE 5 MG: 5 TABLET, FILM COATED ORAL at 08:05

## 2023-12-21 RX ADMIN — INSULIN LISPRO 8 UNITS: 100 INJECTION, SOLUTION INTRAVENOUS; SUBCUTANEOUS at 08:07

## 2023-12-21 RX ADMIN — NIFEDIPINE 30 MG: 30 TABLET, FILM COATED, EXTENDED RELEASE ORAL at 06:13

## 2023-12-21 RX ADMIN — INSULIN GLARGINE 50 UNITS: 100 INJECTION, SOLUTION SUBCUTANEOUS at 08:06

## 2023-12-21 RX ADMIN — METOPROLOL TARTRATE 100 MG: 50 TABLET, FILM COATED ORAL at 21:06

## 2023-12-21 RX ADMIN — INSULIN LISPRO 2 UNITS: 100 INJECTION, SOLUTION INTRAVENOUS; SUBCUTANEOUS at 21:32

## 2023-12-21 RX ADMIN — FERROUS SULFATE TAB 325 MG (65 MG ELEMENTAL FE) 325 MG: 325 (65 FE) TAB at 08:05

## 2023-12-21 RX ADMIN — Medication 10 ML: at 08:05

## 2023-12-21 RX ADMIN — METOPROLOL TARTRATE 100 MG: 50 TABLET, FILM COATED ORAL at 08:05

## 2023-12-21 RX ADMIN — FOLIC ACID 1 MG: 1 TABLET ORAL at 08:05

## 2023-12-21 RX ADMIN — HYDROCODONE BITARTRATE AND ACETAMINOPHEN 1 TABLET: 7.5; 325 TABLET ORAL at 10:08

## 2023-12-21 RX ADMIN — CASTOR OIL AND BALSAM, PERU 1 APPLICATION: 788; 87 OINTMENT TOPICAL at 20:50

## 2023-12-21 RX ADMIN — MUPIROCIN 1 APPLICATION: 20 OINTMENT TOPICAL at 20:52

## 2023-12-21 RX ADMIN — GABAPENTIN 300 MG: 300 CAPSULE ORAL at 08:05

## 2023-12-21 RX ADMIN — BUDESONIDE AND FORMOTEROL FUMARATE DIHYDRATE 2 PUFF: 160; 4.5 AEROSOL RESPIRATORY (INHALATION) at 20:27

## 2023-12-21 RX ADMIN — ZINC OXIDE 1 APPLICATION: 200 OINTMENT TOPICAL at 20:51

## 2023-12-21 RX ADMIN — CASTOR OIL AND BALSAM, PERU 1 APPLICATION: 788; 87 OINTMENT TOPICAL at 08:07

## 2023-12-21 NOTE — PLAN OF CARE
Goal Outcome Evaluation:  Plan of Care Reviewed With: patient        Progress: improving  Outcome Evaluation: Pt is pleasant and cooperative with care. Dressing change done, able to tolerate. Lt leg immobilizer in place. Pt request for pain meds as needed.

## 2023-12-21 NOTE — PLAN OF CARE
Goal Outcome Evaluation:  Plan of Care Reviewed With: patient        Progress: improving  Outcome Evaluation: VSS. NVI. plan sx tomorrow. leg immobilizer present. R chest port present needle change due today, IV therapy aware. chronic lopez. sliding scale. mepalex on bottom stage 2 pressure ulcer. meplex on abd from healing ulcer. lower leg scabs curlex and optifoam. plan to d/c when medically stable.

## 2023-12-21 NOTE — PROGRESS NOTES
Name: Preston Wallis ADMIT: 2023   : 1965  PCP: Kimmy Riley MD    MRN: [id number] LOS: 0 days   AGE/SEX: 58 y.o. male  ROOM: [facility]     Subjective   Subjective      Patient is seen at bedside, no new complaints.         Objective   Objective   Vital Signs  Temp:  [97.8 °F (36.6 °C)-98.3 °F (36.8 °C)] 98.3 °F (36.8 °C)  Heart Rate:  [77-83] 81  Resp:  [16-18] 16  BP: (135-180)/(72-81) 135/79  SpO2:  [92 %-99 %] 96 %  on  Flow (L/min):  [1-2] 1;   Device (Oxygen Therapy): room air  Body mass index is 41.35 kg/m².  Physical Exam  General, awake and alert.  Head and ENT, normocephalic and atraumatic.  Lungs, symmetric expansion, equal air entry bilaterally.  Heart, regular rate and rhythm.  Abdomen, soft and nontender.  Extremities, no clubbing or cyanosis.  Neuro, no focal deficits.  Skin: Warm and no rash.  Psych, normal mood and affect.  Musculoskeletal, joint examination is grossly normal.             Copied text material from yesterday's note has been reviewed for appropriate changes and remains accurate as of 23.      Results Review     I reviewed the patient's new clinical results.  Results from last 7 days   Lab Units 23  0538 23  0516 23  0451 23  0402 12/15/23  0503   WBC 10*3/mm3 10.22 10.52  --  10.86* 13.25*   HEMOGLOBIN g/dL 8.1* 7.9* 8.3* 7.3* 7.8*   PLATELETS 10*3/mm3 430 375  --  308 365     Results from last 7 days   Lab Units 23  0538 23  0516 23  0402 12/15/23  0503   SODIUM mmol/L 136 138 137 140   POTASSIUM mmol/L 4.8 5.1 4.2 4.1   CHLORIDE mmol/L 99 102 104 105   CO2 mmol/L 28.5 25.0 25.0 25.0   BUN mg/dL 31* 26* 28* 28*   CREATININE mg/dL 1.74* 1.48* 1.82* 1.95*   GLUCOSE mg/dL 170* 132* 261* 145*   EGFR mL/min/1.73 44.9* 54.5* 42.5* 39.1*     Results from last 7 days   Lab Units 23  0538 23  0516   ALBUMIN g/dL 3.0* 2.7*   BILIRUBIN mg/dL 0.2 0.2   ALK PHOS U/L 145* 135*   AST (SGOT) U/L 16 17   ALT (SGPT) U/L  11 17     Results from last 7 days   Lab Units 12/21/23  0538 12/20/23  0516 12/16/23  0402 12/15/23  0503   CALCIUM mg/dL 9.1 9.4 8.5* 8.9   ALBUMIN g/dL 3.0* 2.7*  --   --        Glucose   Date/Time Value Ref Range Status   12/21/2023 1548 115 70 - 130 mg/dL Final   12/21/2023 1044 157 (H) 70 - 130 mg/dL Final   12/21/2023 0653 156 (H) 70 - 130 mg/dL Final   12/20/2023 2148 189 (H) 70 - 130 mg/dL Final   12/20/2023 1629 123 70 - 130 mg/dL Final   12/20/2023 1123 154 (H) 70 - 130 mg/dL Final   12/20/2023 0709 149 (H) 70 - 130 mg/dL Final       No radiology results for the last day    I have personally reviewed all medications:  Scheduled Medications  [Held by provider] apixaban, 5 mg, Oral, Q12H  atorvastatin, 20 mg, Oral, Nightly  budesonide-formoterol, 2 puff, Inhalation, BID - RT  calcium carbonate (oyster shell), 500 mg, Oral, Daily  castor oil-balsam peru, 1 application , Topical, Q12H  cholecalciferol, 2,000 Units, Oral, Daily  DULoxetine, 60 mg, Oral, BID  famotidine, 40 mg, Oral, QAM  ferrous sulfate, 325 mg, Oral, BID With Meals  finasteride, 5 mg, Oral, Daily  folic acid, 1 mg, Oral, Daily  gabapentin, 300 mg, Oral, Q12H  hydrocortisone-bacitracin-zinc oxide-nystatin, 1 application , Topical, Q12H  insulin glargine, 50 Units, Subcutaneous, Daily  insulin lispro, 2-7 Units, Subcutaneous, 4x Daily AC & at Bedtime  insulin lispro, 8 Units, Subcutaneous, TID AC  magnesium oxide, 400 mg, Oral, Daily  metoprolol tartrate, 100 mg, Oral, BID  montelukast, 10 mg, Oral, Nightly  mupirocin, 1 application , Topical, Q12H  NIFEdipine XL, 30 mg, Oral, QAM  O2, 2 L/min, Inhalation, Once  senna-docusate sodium, 2 tablet, Oral, BID  sodium chloride, 10 mL, Intravenous, Q12H    Infusions   Diet  Diet: Cardiac Diets; Healthy Heart (2-3 Na+); Texture: Regular Texture (IDDSI 7); Fluid Consistency: Thin (IDDSI 0)    I have personally reviewed:  [x]  Laboratory   [x]  Microbiology   [x]  Radiology   [x]  EKG/Telemetry  [x]   Cardiology/Vascular   []  Pathology    []  Records       Assessment/Plan     Active Hospital Problems    Diagnosis  POA    **Closed fracture of left tibial plateau [S82.142A]  Yes    Essential hypertension [I10]  Yes    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       58 y.o. male admitted with Closed fracture of left tibial plateau.    Assessment and plan  1.  Closed fracture of left tibia plateau, management per Ortho.  Continue pain control and physical therapy.     2.  Hypertension, diabetes, COPD, chronic conditions.     3. Paroxysmal atrial fibrillation, patient is currently rate controlled.     4.  Morbid obesity, complicates aspects of care.     5.  CODE STATUS is full code.  Further plans based on hospital course.            Shabbir Peraza MD  [facility]  12/21/23  16:46 EST

## 2023-12-21 NOTE — PAYOR COMM NOTE
"Preston Wallis \"Gómez\" (58 y.o. Male)      ATTN: BG HARRINGTON    CONTINUED STAY CLINICALS: REF# [id number]      DEPT: [phone],  [phone]    [facility]: NPI [id number] [facility]# [id number]    LEISA REIS RN,[facility]     Date of Birth   1965    Social Security Number       Address   [address] [place] [address]    Home Phone       MRN   [id number]       Denominational   Hoahaoism    Marital Status                               Admission Date   12/14/23    Admission Type   Urgent    Admitting Provider   Yohana Anaya MD    Attending Provider   Shabbir Peraza MD    Department, Room/Bed   [address], P793/1       Discharge Date       Discharge Disposition       Discharge Destination                                 Attending Provider: Shabbir Peraza MD    Allergies: Benadryl [Diphenhydramine], Proventil [Albuterol]    Isolation: None   Infection: MRSA/History Only (12/15/23)   Code Status: CPR    Ht: 175.3 cm (69\")   Wt: 127 kg (280 lb)    Admission Cmt: None   Principal Problem: Closed fracture of left tibial plateau [S82.142A]                   Active Insurance as of 12/14/2023       Primary Coverage       Payor Plan Insurance Group Employer/Plan Group    [facility] BY YING [facility] BY YING [id number]       Payor Plan Address Payor Plan Phone Number Payor Plan Fax Number Effective Dates    [address] [address]   7/1/2022 - None Entered    [facility] [id number]         Subscriber Name Subscriber Birth Date Member ID       PRESTON WALLIS 1965 [id number]                     Emergency Contacts        (Rel.) Home Phone Work Phone Mobile Phone    BimalKami (Spouse) [phone]    Elaine Zaldivar (Daughter) -- -- [phone]              Vital Signs (last 2 days)       Date/Time Temp Temp src Pulse Resp BP Patient Position SpO2    12/21/23 0948 98.2 (36.8) Oral 81 16 150/81 Lying 96    " 12/21/23 0705 -- -- 77 18 -- -- 99    12/21/23 0517 -- -- 81 18 149/72 Lying 97    12/20/23 2050 98 (36.7) Oral 83 18 137/72 Lying 92    12/20/23 1930 -- -- 81 18 -- -- 99    12/20/23 1751 97.8 (36.6) Oral 81 18 180/81 Lying 98    12/20/23 1415 97.4 (36.3) Oral 77 18 157/77 Lying 97    12/20/23 0931 97.9 (36.6) Oral 78 18 152/78 Lying 100    12/20/23 0838 -- -- 78 16 -- -- 99    12/20/23 0536 97.7 (36.5) Oral 75 20 164/80 Lying 98    12/19/23 2111 97.4 (36.3) Oral 78 20 152/77 Lying 91    12/19/23 1922 -- -- 76 20 -- -- 100    12/19/23 1818 97.7 (36.5) Axillary 78 20 150/73 Lying 91    12/19/23 1431 -- -- 73 20 167/75 Lying 98    12/19/23 1030 97.3 (36.3) Oral 72 20 132/69 Lying 100    12/19/23 0858 -- -- -- 24 -- -- 100    12/19/23 0724 -- Oral -- -- -- -- --    12/19/23 0500 97.4 (36.3) Oral 76 16 171/79 Lying 92          Oxygen Therapy (last 2 days)       Date/Time SpO2 Device (Oxygen Therapy) Flow (L/min) Oxygen Concentration (%) ETCO2 (mmHg)    12/21/23 0948 96 room air -- -- --    12/21/23 0805 -- nasal cannula 1 -- --    12/21/23 0705 99 nasal cannula 1.5 -- --    12/21/23 0517 97 nasal cannula 2 -- --    12/20/23 2217 -- nasal cannula 2 -- --    12/20/23 2050 92 nasal cannula 1.5 -- --    12/20/23 2000 -- nasal cannula 2 -- --    12/20/23 1930 99 nasal cannula 1.5 -- --    12/20/23 1751 98 nasal cannula -- -- --    12/20/23 1415 97 nasal cannula 1.5 -- --    12/20/23 0931 100 nasal cannula 1.5 -- --    12/20/23 0838 99 -- -- -- --    12/20/23 0835 -- nasal cannula 1.5 -- --    12/20/23 0807 -- nasal cannula 2 -- --    12/20/23 0536 98 nasal cannula 2 -- --    12/19/23 2111 91 nasal cannula 2 -- --    12/19/23 2000 -- nasal cannula 2 -- --    12/19/23 1922 100 nasal cannula 1.5 -- --    12/19/23 1818 91 nasal cannula 1.5 -- --    12/19/23 1431 98 nasal cannula 1.5 -- --    12/19/23 1030 100 nasal cannula 1.5 -- --    12/19/23 0858 100 nasal cannula 1.5 -- --    12/19/23 0813 -- nasal cannula 2 -- --     "12/19/23 0500 92 nasal cannula 1 -- --          Intake & Output (last 2 days)         12/19 0701  12/20 0700 12/20 0701 12/21 0700 12/21 0701 12/22 0700    P.O. 800 420 340    Total Intake(mL/kg) 800 (6.3) 420 (3.3) 340 (2.7)    Urine (mL/kg/hr) 4150 (1.4) 2550 (0.8) 800 (1)    Stool 0 0     Total Output 4150 2550 800    Net -3350 -2130 -460           Stool Unmeasured Occurrence 1 x 1 x           Lines, Drains & Airways       Active LDAs       Name Placement date Placement time Site Days    Urethral Catheter Non-latex 16 Fr. 12/14/23 2038  -- 6    Single Lumen Implantable Port Right Subclavian --  --  Subclavian  --               Medication Administration Report for Preston Wallis \"Gómez\" as of 12/21/23 1329     Legend:    Given Hold Not Given Due Canceled Entry Other Actions    Time Time (Time) Time Time-Action         Discontinued     Completed     Future     MAR Hold     Linked             Medications 12/19/23 12/20/23 12/21/23      acetaminophen (TYLENOL) tablet 650 mg  Dose: 650 mg  Freq: Every 4 Hours PRN Route: PO  PRN Reason: Mild Pain  Start: 12/14/23 1826   Admin Instructions:   Do not exceed 4 grams of acetaminophen in a 24 hr period.    If given for pain, use the following pain scale:   Mild Pain = Pain Score of 1-3, CPOT 1-2  Moderate Pain = Pain Score of 4-6, CPOT 3-4  Severe Pain = Pain Score of 7-10, CPOT 5-8  Based on patient request - if ordered for moderate or severe pain, provider allows for administration of a medication prescribed for a lower pain scale.    Do not exceed 4 grams of acetaminophen in a 24 hr period. Max dose of 2gm for AST/ALT greater than 120 units/L.    If given for pain, use the following pain scale:   Mild Pain = Pain Score of 1-3, CPOT 1-2  Moderate Pain = Pain Score of 4-6, CPOT 3-4  Severe Pain = Pain Score of 7-10, CPOT 5-8          albuterol (ACCUNEB) nebulizer solution 0.63 mg  Dose: 0.63 mg  Freq: Every 6 Hours PRN Route: NEBULIZATION  PRN Reason: Wheezing  Start: " 12/15/23 0327   Admin Instructions:   Include Respiratory Treatment Education          apixaban (ELIQUIS) tablet 5 mg  Dose: 5 mg  Freq: Every 12 Hours Scheduled Route: PO  Indications of Use: Post Surgical - Other  Start: 12/16/23 1100   Admin Instructions:   Tablet may be crushed and suspended in 60 mL of water or D5W and immediately delivered via NG tube.    0813-Given     2140-Given         0000-Held by provider [C]     0900-Held by provider     2100-Held by provider        0900-Held by provider     2100-Held by provider            atorvastatin (LIPITOR) tablet 20 mg  Dose: 20 mg  Freq: Nightly Route: PO  Start: 12/15/23 2100   Admin Instructions:   Avoid grapefruit juice.    2139-Given          2202-Given          2100             sennosides-docusate (PERICOLACE) 8.6-50 MG per tablet 2 tablet  Dose: 2 tablet  Freq: 2 Times Daily Route: PO  Start: 12/14/23 2100   Admin Instructions:   HOLD MEDICATION IF PATIENT HAS HAD BOWEL MOVEMENT. Start bowel management regimen if patient has not had a bowel movement after 12 hours.    0812-Given     2140-Given         (0808)-Not Given [C]     (2205)-Not Given         (0807)-Not Given [C]     2100           And  polyethylene glycol (MIRALAX) packet 17 g  Dose: 17 g  Freq: Daily PRN Route: PO  PRN Reason: Constipation  PRN Comment: Use if senna-docusate is ineffective  Start: 12/14/23 1826   Admin Instructions:   Use if no bowel movement after 12 hours. Mix in 6-8 ounces of water.  Use 4-8 ounces of water, tea, or juice for each 17 gram dose.         And  bisacodyl (DULCOLAX) EC tablet 5 mg  Dose: 5 mg  Freq: Daily PRN Route: PO  PRN Reason: Constipation  PRN Comment: Use if polyethylene glycol is ineffective  Start: 12/14/23 1826   Admin Instructions:   Use if no bowel movement after 12 hours.  Swallow whole. Do not crush, split, or chew tablet.         And  bisacodyl (DULCOLAX) suppository 10 mg  Dose: 10 mg  Freq: Daily PRN Route: RE  PRN Reason: Constipation  PRN Comment:  Use if bisacodyl oral is ineffective  Start: 12/14/23 1826   Admin Instructions:   Use if no bowel movement after 12 hours.  Hold for diarrhea          budesonide-formoterol (SYMBICORT) 160-4.5 MCG/ACT inhaler 2 puff  Dose: 2 puff  Freq: 2 Times Daily - RT Route: IN  Start: 12/15/23 0930   Admin Instructions:   Include Respiratory Treatment Education     Shake well.  Rinse mouth after use, do not swallow water.  Send aerosols to pharmacy in ziplock bag for proper disposal.    0857-Given     1922-Given         0834-Given     1930-Given         0705-Given     2130            calcium carbonate (oyster shell) tablet 500 mg  Dose: 500 mg  Freq: Daily Route: PO  Start: 12/15/23 0900    0817-Given          0809-Given          0805-Given             castor oil-balsam peru (VENELEX) ointment 1 application   Dose: 1 application   Freq: Every 12 Hours Scheduled Route: TOP  Start: 12/18/23 1300   Admin Instructions:   Apply to left Heel.    0812-Given     2141-Given         0808-Given     2203-Given         0807-Given     2100            cholecalciferol (VITAMIN D3) tablet 2,000 Units  Dose: 2,000 Units  Freq: Daily Route: PO  Start: 12/15/23 0900    0812-Given          0807-Given          0805-Given             dextrose (D50W) (25 g/50 mL) IV injection 25 g  Dose: 25 g  Freq: Every 15 Minutes PRN Route: IV  PRN Reason: Low Blood Sugar  PRN Comment: Blood Sugar Less Than 70  Start: 12/14/23 2237   Admin Instructions:   Blood sugar less than 70; patient has IV access - Unresponsive, NPO or Unable To Safely Swallow          dextrose (GLUTOSE) oral gel 15 g  Dose: 15 g  Freq: Every 15 Minutes PRN Route: PO  PRN Reason: Low Blood Sugar  PRN Comment: Blood sugar less than 70  Start: 12/14/23 2237   Admin Instructions:   BS<70, Patient Alert, Is not NPO, Can safely swallow.          DULoxetine (CYMBALTA) DR capsule 60 mg  Dose: 60 mg  Freq: 2 Times Daily Route: PO  Start: 12/15/23 0900   Admin Instructions:   Do not crush or chew the  capsules or tablets. The drug may not work as designed if the capsule or tablet is crushed or chewed. Swallow whole.  Caution: Look alike/sound alike drug alert. Capsule may be opened and sprinkled on applesauce or apple juice. Do not crush or chew capsule.    0815-Given     2140-Given         0807-Given     2216-Given         0805-Given     2100            famotidine (PEPCID) tablet 40 mg  Dose: 40 mg  Freq: Every Morning Route: PO  Start: 12/15/23 0700    0627-Given          0612-Given          0613-Given             ferrous sulfate tablet 325 mg  Dose: 325 mg  Freq: 2 Times Daily With Meals Route: PO  Start: 12/17/23 1800   Admin Instructions:   Swallow whole. Do not crush, split, or chew. Take with food if GI upset occurs.    0812-Given     1734-Given         0807-Given     1727-Given         0805-Given     1800            finasteride (PROSCAR) tablet 5 mg  Dose: 5 mg  Freq: Daily Route: PO  Start: 12/15/23 0900   Admin Instructions:   Do not crush or chew the capsules or tablets. Contact Pharmacy if needed.  Group 2 ([place]) Hazardous Drug - Reproductive Risk Only - See Handling Guide    0816-Given          0808-Given          0805-Given             folic acid (FOLVITE) tablet 1 mg  Dose: 1 mg  Freq: Daily Route: PO  Start: 12/15/23 0900    0816-Given          0808-Given          0805-Given             gabapentin (NEURONTIN) capsule 300 mg  Dose: 300 mg  Freq: Every 12 Hours Scheduled Route: PO  Start: 12/15/23 0900   Admin Instructions:         0812-Given     2140-Given         0807-Given     2202-Given         0805-Given     2100            glucagon (GLUCAGEN) injection 1 mg  Dose: 1 mg  Freq: Every 15 Minutes PRN Route: IM  PRN Reason: Low Blood Sugar  PRN Comment: Blood Glucose Less Than 70  Start: 12/14/23 2237   Admin Instructions:   Blood Glucose Less Than 70 - Patient Without IV Access - Unresponsive, NPO or Unable To Safely Swallow  Reconstitute powder for injection by adding 1 mL of  -supplied sterile diluent or sterile water for injection to a vial containing 1 mg of the drug, to provide solutions containing 1 mg/mL. Shake vial gently to dissolve.          heparin injection 500 Units  Dose: 5 mL  Freq: As Needed Route: IV  PRN Reason: Line Care  PRN Comment: Prior to De-Accessing Port  Start: 12/14/23 2014          HYDROcodone-acetaminophen (NORCO) 5-325 MG per tablet 1 tablet  Dose: 1 tablet  Freq: Every 4 Hours PRN Route: PO  PRN Reason: Moderate Pain  Start: 12/14/23 1826   End: 12/24/23 1825   Admin Instructions:   [ANDREINA]    Do not exceed 4 grams of acetaminophen in a 24 hr period.    If given for pain, use the following pain scale:   Mild Pain = Pain Score of 1-3, CPOT 1-2  Moderate Pain = Pain Score of 4-6, CPOT 3-4  Severe Pain = Pain Score of 7-10, CPOT 5-8  [ANDREINA]    Do not exceed 4 grams of acetaminophen in a 24 hr period. Max dose of 2gm for AST/ALT greater than 120 units/L        If given for pain, use the following pain scale:   Mild Pain = Pain Score of 1-3, CPOT 1-2  Moderate Pain = Pain Score of 4-6, CPOT 3-4  Severe Pain = Pain Score of 7-10, CPOT 5-8          HYDROcodone-acetaminophen (NORCO) 7.5-325 MG per tablet 1 tablet  Dose: 1 tablet  Freq: Every 6 Hours PRN Route: PO  PRN Reason: Moderate Pain  Start: 12/14/23 2241   End: 12/21/23 2240   Admin Instructions:   Based on patient request - if ordered for moderate or severe pain, provider allows for administration of a medication prescribed for a lower pain scale.  [ANDREINA]    Do not exceed 4 grams of acetaminophen in a 24 hr period. Max dose of 2gm for AST/ALT greater than 120 units/L        If given for pain, use the following pain scale:   Mild Pain = Pain Score of 1-3, CPOT 1-2  Moderate Pain = Pain Score of 4-6, CPOT 3-4  Severe Pain = Pain Score of 7-10, CPOT 5-8    0314-Given     0815-Return to Cabinet     0915-Given        1018-Given          1008-Given             hydrocortisone-bacitracin-zinc oxide-nystatin  (MAGIC BARRIER) ointment 1 application   Dose: 1 application   Freq: Every 12 Hours Scheduled Route: TOP  Start: 12/18/23 1300   Admin Instructions:   For topical use only    0816-Given     2141-Given         0808-Given     2204-Given         0806-Given     2100            HYDROmorphone (DILAUDID) injection 0.5 mg  Dose: 0.5 mg  Freq: Every 2 Hours PRN Route: IV  PRN Reason: Severe Pain  Start: 12/14/23 1826   End: 12/21/23 1825   Admin Instructions:   Based on patient request - if ordered for moderate or severe pain, provider allows for administration of a medication prescribed for a lower pain scale.  If given for pain, use the following pain scale:  Mild Pain = Pain Score of 1-3, CPOT 1-2  Moderate Pain = Pain Score of 4-6, CPOT 3-4  Severe Pain = Pain Score of 7-10, CPOT 5-8     2217-Given             And  naloxone (NARCAN) injection 0.4 mg  Dose: 0.4 mg  Freq: Every 5 Minutes PRN Route: IV  PRN Reason: Respiratory Depression  Start: 12/14/23 1826   Admin Instructions:   If Respiratory Rate Less Than 8 or Patient is Difficult to Arouse, Stop ALL Narcotics & Contact Provider.  Administer Slow IV Push.  Repeat As Ordered Until Respiratory Rate is Greater Than 12.          insulin glargine (LANTUS, SEMGLEE) injection 50 Units  Dose: 50 Units  Freq: Daily Route: SC  Start: 12/15/23 0900   Admin Instructions:   Do not hold basal insulin without an order. Consider requesting a dose edit, if needed.        0813-Given          0807-Given          0806-Given             insulin lispro (HUMALOG/ADMELOG) injection 2-7 Units  Dose: 2-7 Units  Freq: 4 Times Daily Before Meals & Nightly Route: SC  Start: 12/14/23 2253   Admin Instructions:   Correction Insulin - Low Dose - Total Insulin Dose Less Than 40 units/day (Lean, Elderly or Renal Patients)    Blood Glucose 150-199 mg/dL - 2 units  Blood Glucose 200-249 mg/dL - 3 units  Blood Glucose 250-299 mg/dL - 4 units  Blood Glucose 300-349 mg/dL - 5 units  Blood Glucose 350-400  mg/dL - 6 units  Blood Glucose Greater Than 400 mg/dL - 7 units & Call Provider     Caution: Look alike/sound alike drug alert      (0726)-Not Given     1209-Given     1733-Given       2140-Given          (0737)-Not Given     1135-Given     (1728)-Not Given       2219-Given          0806-Given     1204-Given     1730       2100             insulin lispro (HUMALOG/ADMELOG) injection 8 Units  Dose: 8 Units  Freq: 3 Times Daily Before Meals Route: SC  Start: 12/15/23 0730   Admin Instructions:    Solution should be clear. If cloudy, contact pharmacy for a new vial. Scheduled mealtime doses of this medication should be held if patient NPO.     Caution: Look alike/sound alike drug alert      0814-Given     1209-Given     1733-Given        0807-Given     1135-Given     1727-Given        0807-Given     1203-Given     1730           magnesium oxide (MAG-OX) tablet 400 mg  Dose: 400 mg  Freq: Daily Route: PO  Start: 12/15/23 0900   Admin Instructions:   Each 400mg of magnesium oxide is equal to 241.3mg of elemental magnesium.    0816-Given          0808-Given          0805-Given             melatonin tablet 3 mg  Dose: 3 mg  Freq: Nightly PRN Route: PO  PRN Reason: Sleep  Start: 12/14/23 1826          metoprolol tartrate (LOPRESSOR) tablet 100 mg  Dose: 100 mg  Freq: 2 Times Daily Route: PO  Start: 12/15/23 0900   Admin Instructions:   Hold for SBP less than 100, DBP less than 60, or heart rate less than 50    0812-Given     2140-Given         0807-Given     2202-Given         0805-Given     2100            montelukast (SINGULAIR) tablet 10 mg  Dose: 10 mg  Freq: Nightly Route: PO  Start: 12/15/23 2100    2139-Given          2202-Given          2100             mupirocin (BACTROBAN) 2 % ointment 1 application   Dose: 1 application   Freq: Every 12 Hours Scheduled Route: TOP  Start: 12/18/23 1400   Admin Instructions:   Apply to abdomen excoriation         0816-Given     2141-Given         0808-Given     2204-Given          0806-Given     2100            NIFEdipine XL (PROCARDIA XL) 24 hr tablet 30 mg  Dose: 30 mg  Freq: Every Morning Route: PO  Start: 12/15/23 0700   Admin Instructions:   Hold for SBP less than 100, DBP less than 60  Caution: Look alike/sound alike drug alert. Avoid grapefruit juice. Swallow whole. Do not crush, split or chew.    0627-Given          0612-Given          0613-Given             O2 (OXYGEN)  Dose: 2 L/min  Freq: Once Route: IN  Start: 12/15/23 0415          ondansetron (ZOFRAN) tablet 4 mg  Dose: 4 mg  Freq: Every 6 Hours PRN Route: PO  PRN Reasons: Nausea,Vomiting  Start: 12/14/23 1826         Or  ondansetron (ZOFRAN) injection 4 mg  Dose: 4 mg  Freq: Every 6 Hours PRN Route: IV  PRN Reasons: Nausea,Vomiting  Start: 12/14/23 1826          sodium chloride 0.9 % flush 10 mL  Dose: 10 mL  Freq: As Needed Route: IV  PRN Reason: Line Care  PRN Comment: After Medication Administration & Prior to De-Accessing Port  Start: 12/14/23 2014          sodium chloride 0.9 % flush 10 mL  Dose: 10 mL  Freq: Every 12 Hours Scheduled Route: IV  Start: 12/14/23 2100   Admin Instructions:   If Port Accessed But Not In Use    0817-Given     2100-Given         0809-Given     2203-Given         0805-Given     2100            sodium chloride 0.9 % flush 10 mL  Dose: 10 mL  Freq: As Needed Route: IV  PRN Reason: Line Care  Start: 12/14/23 2014          sodium chloride 0.9 % flush 10 mL  Dose: 10 mL  Freq: As Needed Route: IV  PRN Reason: Line Care  Start: 12/14/23 1502          sodium chloride 0.9 % flush 20 mL  Dose: 20 mL  Freq: As Needed Route: IV  PRN Reason: Line Care  PRN Comment: After Blood Draws or Blood Product Administration  Start: 12/14/23 2014          sodium chloride 0.9 % infusion 40 mL  Dose: 40 mL  Freq: As Needed Route: IV  PRN Reason: Line Care  Start: 12/14/23 2014   Admin Instructions:   Following administration of an IV intermittent medication, flush line with 40mL NS at 100mL/hr.         Completed  "Medications  Medications 12/19/23 12/20/23 12/21/23       morphine injection 4 mg  Dose: 4 mg  Freq: Once Route: IV  Start: 12/14/23 1703   End: 12/14/23 1657   Admin Instructions:   Based on patient request - if ordered for moderate or severe pain, provider allows for administration of a medication prescribed for a lower pain scale.  If given for pain, use the following pain scale:  Mild Pain = Pain Score of 1-3, CPOT 1-2  Moderate Pain = Pain Score of 4-6, CPOT 3-4  Severe Pain = Pain Score of 7-10, CPOT 5-8          ondansetron (ZOFRAN) injection 4 mg  Dose: 4 mg  Freq: Once Route: IV  Start: 12/14/23 1703   End: 12/14/23 1659   Admin Instructions:   \"If multiple N/V medications ordered, use in the following order: Ondansetron, Prochlorperazine, Promethazine. Use PO unless patient refuses or patient unable to swallow.\"         Discontinued Medications  Medications 12/19/23 12/20/23 12/21/23       ferrous sulfate tablet 325 mg  Dose: 325 mg  Freq: Daily With Breakfast Route: PO  Start: 12/15/23 0800   End: 12/17/23 0912   Admin Instructions:   Swallow whole. Do not crush, split, or chew. Take with food if GI upset occurs.          sodium chloride 0.9 % infusion  Rate: 75 mL/hr Dose: 75 mL/hr  Freq: Continuous Route: IV  Start: 12/14/23 1842   End: 12/16/23 0727             Lab Results (last 72 hours)       Procedure Component Value Units Date/Time    POC Glucose Once [[id number]]  (Abnormal) Collected: 12/21/23 1044    Specimen: Blood Updated: 12/21/23 1045     Glucose 157 mg/dL     POC Glucose Once [[id number]]  (Abnormal) Collected: 12/21/23 0653    Specimen: Blood Updated: 12/21/23 0654     Glucose 156 mg/dL     Comprehensive Metabolic Panel [[id number]]  (Abnormal) Collected: 12/21/23 0538    Specimen: Blood Updated: 12/21/23 0647     Glucose 170 mg/dL      BUN 31 mg/dL      Creatinine 1.74 mg/dL      Sodium 136 mmol/L      Potassium 4.8 mmol/L      Comment: Slight hemolysis detected by analyzer. Result may be " falsely elevated.        Chloride 99 mmol/L      CO2 28.5 mmol/L      Calcium 9.1 mg/dL      Total Protein 6.5 g/dL      Albumin 3.0 g/dL      ALT (SGPT) 11 U/L      AST (SGOT) 16 U/L      Alkaline Phosphatase 145 U/L      Total Bilirubin 0.2 mg/dL      Globulin 3.5 gm/dL      A/G Ratio 0.9 g/dL      BUN/Creatinine Ratio 17.8     Anion Gap 8.5 mmol/L      eGFR 44.9 mL/min/1.73     Narrative:      GFR Normal >60  Chronic Kidney Disease <60  Kidney Failure <15      CBC & Differential [[id number]]  (Abnormal) Collected: 12/21/23 0538    Specimen: Blood Updated: 12/21/23 0630    Narrative:      The following orders were created for panel order CBC & Differential.  Procedure                               Abnormality         Status                     ---------                               -----------         ------                     CBC Auto Differential[[id number]]        Abnormal            Final result                 Please view results for these tests on the individual orders.    CBC Auto Differential [[id number]]  (Abnormal) Collected: 12/21/23 0538    Specimen: Blood Updated: 12/21/23 0630     WBC 10.22 10*3/mm3      RBC 2.82 10*6/mm3      Hemoglobin 8.1 g/dL      Hematocrit 25.4 %      MCV 90.1 fL      MCH 28.7 pg      MCHC 31.9 g/dL      RDW 13.1 %      RDW-SD 42.6 fl      MPV 8.9 fL      Platelets 430 10*3/mm3      Neutrophil % 58.4 %      Lymphocyte % 24.3 %      Monocyte % 11.3 %      Eosinophil % 4.6 %      Basophil % 0.9 %      Immature Grans % 0.5 %      Neutrophils, Absolute 5.98 10*3/mm3      Lymphocytes, Absolute 2.48 10*3/mm3      Monocytes, Absolute 1.15 10*3/mm3      Eosinophils, Absolute 0.47 10*3/mm3      Basophils, Absolute 0.09 10*3/mm3      Immature Grans, Absolute 0.05 10*3/mm3      nRBC 0.0 /100 WBC     POC Glucose Once [[id number]]  (Abnormal) Collected: 12/20/23 2148    Specimen: Blood Updated: 12/20/23 2149     Glucose 189 mg/dL     POC Glucose Once [[id number]]  (Normal) Collected: 12/20/23  1629    Specimen: Blood Updated: 12/20/23 1631     Glucose 123 mg/dL     POC Glucose Once [[id number]]  (Abnormal) Collected: 12/20/23 1123    Specimen: Blood Updated: 12/20/23 1125     Glucose 154 mg/dL     POC Glucose Once [[id number]]  (Abnormal) Collected: 12/20/23 0709    Specimen: Blood Updated: 12/20/23 0710     Glucose 149 mg/dL     CBC & Differential [[id number]]  (Abnormal) Collected: 12/20/23 0516    Specimen: Blood Updated: 12/20/23 0652    Narrative:      The following orders were created for panel order CBC & Differential.  Procedure                               Abnormality         Status                     ---------                               -----------         ------                     CBC Auto Differential[[id number]]        Abnormal            Final result               Scan Slide[[id number]]                   Normal              Final result                 Please view results for these tests on the individual orders.    CBC Auto Differential [[id number]]  (Abnormal) Collected: 12/20/23 0516    Specimen: Blood Updated: 12/20/23 0652     WBC 10.52 10*3/mm3      RBC 2.72 10*6/mm3      Hemoglobin 7.9 g/dL      Hematocrit 24.7 %      MCV 90.8 fL      MCH 29.0 pg      MCHC 32.0 g/dL      RDW 13.0 %      RDW-SD 42.8 fl      MPV 9.5 fL      Platelets 375 10*3/mm3      Neutrophil % 63.7 %      Lymphocyte % 19.3 %      Monocyte % 10.5 %      Eosinophil % 4.9 %      Basophil % 1.0 %      Neutrophils, Absolute 6.70 10*3/mm3      Lymphocytes, Absolute 2.03 10*3/mm3      Monocytes, Absolute 1.10 10*3/mm3      Eosinophils, Absolute 0.52 10*3/mm3      Basophils, Absolute 0.11 10*3/mm3     Scan Slide [[id number]]  (Normal) Collected: 12/20/23 0516    Specimen: Blood Updated: 12/20/23 0652     RBC Morphology Normal     WBC Morphology Normal     Platelet Morphology Normal    Comprehensive Metabolic Panel [[id number]]  (Abnormal) Collected: 12/20/23 0516    Specimen: Blood Updated: 12/20/23 0615     Glucose 132 mg/dL       BUN 26 mg/dL      Creatinine 1.48 mg/dL      Sodium 138 mmol/L      Potassium 5.1 mmol/L      Comment: Slight hemolysis detected by analyzer. Result may be falsely elevated.        Chloride 102 mmol/L      CO2 25.0 mmol/L      Calcium 9.4 mg/dL      Total Protein 6.6 g/dL      Albumin 2.7 g/dL      ALT (SGPT) 17 U/L      AST (SGOT) 17 U/L      Comment: Slight hemolysis detected by analyzer. Result may be falsely elevated.        Alkaline Phosphatase 135 U/L      Total Bilirubin 0.2 mg/dL      Globulin 3.9 gm/dL      A/G Ratio 0.7 g/dL      BUN/Creatinine Ratio 17.6     Anion Gap 11.0 mmol/L      eGFR 54.5 mL/min/1.73     Narrative:      GFR Normal >60  Chronic Kidney Disease <60  Kidney Failure <15      POC Glucose Once [[id number]]  (Abnormal) Collected: 23    Specimen: Blood Updated: 23 203     Glucose 187 mg/dL     POC Glucose Once [[id number]]  (Abnormal) Collected: 23 164    Specimen: Blood Updated: 23 165     Glucose 188 mg/dL     POC Glucose Once [[id number]]  (Abnormal) Collected: 23 1128    Specimen: Blood Updated: 23 1130     Glucose 187 mg/dL     POC Glucose Once [[id number]]  (Abnormal) Collected: 23    Specimen: Blood Updated: 23     Glucose 140 mg/dL     POC Glucose Once [[id number]]  (Abnormal) Collected: 23    Specimen: Blood Updated: 23     Glucose 158 mg/dL     POC Glucose Once [[id number]]  (Abnormal) Collected: 23 1629    Specimen: Blood Updated: 23 1631     Glucose 148 mg/dL                Physician Progress Notes (last 72 hours)        Shabbir Peraza MD at 23 3524              Name: Preston Wallis ADMIT: 2023   : 1965  PCP: Kimmy Riley MD    MRN: [id number] LOS: 0 days   AGE/SEX: 58 y.o. male  ROOM: [facility]     Subjective   Subjective   Patient seen at bedside.      Objective   Objective   Vital Signs  Temp:  [97.4 °F (36.3 °C)-97.9 °F (36.6 °C)] 97.4 °F (36.3  °C)  Heart Rate:  [75-78] 77  Resp:  [16-20] 18  BP: (150-164)/(73-80) 157/77  SpO2:  [91 %-100 %] 97 %  on  Flow (L/min):  [1.5-2] 1.5;   Device (Oxygen Therapy): nasal cannula  Body mass index is 41.35 kg/m².  Physical Exam  General, awake and alert.  Head and ENT, normocephalic and atraumatic.  Lungs, symmetric expansion, equal air entry bilaterally.  Heart, regular rate and rhythm.  Abdomen, soft and nontender.  Extremities, no clubbing or cyanosis.  Neuro, no focal deficits.  Skin: Warm and no rash.  Psych, normal mood and affect.  Musculoskeletal, joint examination is grossly normal.          Copied text material from yesterday's note has been reviewed for appropriate changes and remains accurate as of 12/20/23.          Results Review     I reviewed the patient's new clinical results.  Results from last 7 days   Lab Units 12/20/23  0516 12/17/23  0451 12/16/23  0402 12/15/23  0503 12/14/23  1556   WBC 10*3/mm3 10.52  --  10.86* 13.25* 12.37*   HEMOGLOBIN g/dL 7.9* 8.3* 7.3* 7.8* 9.6*   PLATELETS 10*3/mm3 375  --  308 365 414     Results from last 7 days   Lab Units 12/20/23  0516 12/16/23  0402 12/15/23  0503 12/14/23  1556   SODIUM mmol/L 138 137 140 137   POTASSIUM mmol/L 5.1 4.2 4.1 4.5   CHLORIDE mmol/L 102 104 105 100   CO2 mmol/L 25.0 25.0 25.0 25.6   BUN mg/dL 26* 28* 28* 26*   CREATININE mg/dL 1.48* 1.82* 1.95* 2.08*   GLUCOSE mg/dL 132* 261* 145* 157*   EGFR mL/min/1.73 54.5* 42.5* 39.1* 36.2*     Results from last 7 days   Lab Units 12/20/23  0516 12/14/23  1556 12/14/23  0643   ALBUMIN g/dL 2.7* 3.6 3.3*   BILIRUBIN mg/dL 0.2 <0.2  --    ALK PHOS U/L 135* 129*  --    AST (SGOT) U/L 17 17  --    ALT (SGPT) U/L 17 26  --      Results from last 7 days   Lab Units 12/20/23  0516 12/16/23  0402 12/15/23  0503 12/14/23  1556 12/14/23  0643   CALCIUM mg/dL 9.4 8.5* 8.9 9.2 9.2   ALBUMIN g/dL 2.7*  --   --  3.6 3.3*   MAGNESIUM mg/dL  --   --   --   --  1.9   PHOSPHORUS mg/dL  --   --   --   --  3.8        Glucose   Date/Time Value Ref Range Status   12/20/2023 1629 123 70 - 130 mg/dL Final   12/20/2023 1123 154 (H) 70 - 130 mg/dL Final   12/20/2023 0709 149 (H) 70 - 130 mg/dL Final   12/19/2023 2029 187 (H) 70 - 130 mg/dL Final   12/19/2023 1649 188 (H) 70 - 130 mg/dL Final   12/19/2023 1128 187 (H) 70 - 130 mg/dL Final   12/19/2023 0718 140 (H) 70 - 130 mg/dL Final       No radiology results for the last day    I have personally reviewed all medications:  Scheduled Medications  [Held by provider] apixaban, 5 mg, Oral, Q12H  atorvastatin, 20 mg, Oral, Nightly  budesonide-formoterol, 2 puff, Inhalation, BID - RT  calcium carbonate (oyster shell), 500 mg, Oral, Daily  castor oil-balsam peru, 1 application , Topical, Q12H  cholecalciferol, 2,000 Units, Oral, Daily  DULoxetine, 60 mg, Oral, BID  famotidine, 40 mg, Oral, QAM  ferrous sulfate, 325 mg, Oral, BID With Meals  finasteride, 5 mg, Oral, Daily  folic acid, 1 mg, Oral, Daily  gabapentin, 300 mg, Oral, Q12H  hydrocortisone-bacitracin-zinc oxide-nystatin, 1 application , Topical, Q12H  insulin glargine, 50 Units, Subcutaneous, Daily  insulin lispro, 2-7 Units, Subcutaneous, 4x Daily AC & at Bedtime  insulin lispro, 8 Units, Subcutaneous, TID AC  magnesium oxide, 400 mg, Oral, Daily  metoprolol tartrate, 100 mg, Oral, BID  montelukast, 10 mg, Oral, Nightly  mupirocin, 1 application , Topical, Q12H  NIFEdipine XL, 30 mg, Oral, QAM  O2, 2 L/min, Inhalation, Once  senna-docusate sodium, 2 tablet, Oral, BID  sodium chloride, 10 mL, Intravenous, Q12H    Infusions   Diet  Diet: Cardiac Diets; Healthy Heart (2-3 Na+); Texture: Regular Texture (IDDSI 7); Fluid Consistency: Thin (IDDSI 0)    I have personally reviewed:  [x]  Laboratory   [x]  Microbiology   [x]  Radiology   [x]  EKG/Telemetry  [x]  Cardiology/Vascular   []  Pathology    []  Records      Assessment/Plan     Active Hospital Problems    Diagnosis  POA    **Closed fracture of left tibial plateau [S82.142A]  Yes     Essential hypertension [I10]  Yes    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       58 y.o. male admitted with Closed fracture of left tibial plateau.    Assessment and plan  1.  Closed fracture of left tibia plateau, management per Ortho.  Continue pain control and physical therapy.     2.  Hypertension, diabetes, COPD, chronic conditions.     3. Paroxysmal atrial fibrillation, patient is currently rate controlled.     4.  Morbid obesity, complicates aspects of care.     5.  CODE STATUS is full code.  Further plans based on hospital course.      Shabbir Peraza MD  [facility]  23  17:44 EST      Electronically signed by Shabbir Peraza MD at 23 [phone]       Shabbir Peraza MD at 23 [phone]              Name: Preston Wallis ADMIT: 2023   : 1965  PCP: Kimmy Riley MD    MRN: [id number] LOS: 0 days   AGE/SEX: 58 y.o. male  ROOM: [facility]     Subjective   Subjective   Patient is seen at bedside, no new complaints.      Objective   Objective   Vital Signs  Temp:  [97.3 °F (36.3 °C)-98.4 °F (36.9 °C)] 97.3 °F (36.3 °C)  Heart Rate:  [72-76] 73  Resp:  [16-24] 20  BP: (132-171)/(69-79) 167/75  SpO2:  [92 %-100 %] 98 %  on  Flow (L/min):  [1-2] 1.5;   Device (Oxygen Therapy): nasal cannula  Body mass index is 41.35 kg/m².  Physical Exam  General, awake and alert.  Head and ENT, normocephalic and atraumatic.  Lungs, symmetric expansion, equal air entry bilaterally.  Heart, regular rate and rhythm.  Abdomen, soft and nontender.  Extremities, no clubbing or cyanosis.  Neuro, no focal deficits.  Skin: Warm and no rash.  Psych, normal mood and  affect.  Musculoskeletal, joint examination is grossly normal.      Results Review     I reviewed the patient's new clinical results.  Results from last 7 days   Lab Units 12/17/23  0451 12/16/23  0402 12/15/23  0503 12/14/23  1556 12/14/23  0643   WBC 10*3/mm3  --  10.86* 13.25* 12.37* 13.47*   HEMOGLOBIN g/dL 8.3* 7.3* 7.8* 9.6* 8.5*   PLATELETS 10*3/mm3  --  308 365 414 408     Results from last 7 days   Lab Units 12/16/23  0402 12/15/23  0503 12/14/23  1556 12/14/23  0643   SODIUM mmol/L 137 140 137 137   POTASSIUM mmol/L 4.2 4.1 4.5 4.1   CHLORIDE mmol/L 104 105 100 101   CO2 mmol/L 25.0 25.0 25.6 27.1   BUN mg/dL 28* 28* 26* 26*   CREATININE mg/dL 1.82* 1.95* 2.08* 1.94*   GLUCOSE mg/dL 261* 145* 157* 184*   EGFR mL/min/1.73 42.5* 39.1* 36.2* 39.4*     Results from last 7 days   Lab Units 12/14/23  1556 12/14/23  0643 12/13/23  0540   ALBUMIN g/dL 3.6 3.3* 3.1*   BILIRUBIN mg/dL <0.2  --   --    ALK PHOS U/L 129*  --   --    AST (SGOT) U/L 17  --   --    ALT (SGPT) U/L 26  --   --      Results from last 7 days   Lab Units 12/16/23  0402 12/15/23  0503 12/14/23  1556 12/14/23  0643 12/13/23  0540   CALCIUM mg/dL 8.5* 8.9 9.2 9.2 9.1   ALBUMIN g/dL  --   --  3.6 3.3* 3.1*   MAGNESIUM mg/dL  --   --   --  1.9 1.8   PHOSPHORUS mg/dL  --   --   --  3.8 3.5       Glucose   Date/Time Value Ref Range Status   12/19/2023 1649 188 (H) 70 - 130 mg/dL Final   12/19/2023 1128 187 (H) 70 - 130 mg/dL Final   12/19/2023 0718 140 (H) 70 - 130 mg/dL Final   12/18/2023 2055 158 (H) 70 - 130 mg/dL Final   12/18/2023 1629 148 (H) 70 - 130 mg/dL Final   12/18/2023 1121 137 (H) 70 - 130 mg/dL Final   12/18/2023 0723 85 70 - 130 mg/dL Final       No radiology results for the last day    I have personally reviewed all medications:  Scheduled Medications  apixaban, 5 mg, Oral, Q12H  atorvastatin, 20 mg, Oral, Nightly  budesonide-formoterol, 2 puff, Inhalation, BID - RT  calcium carbonate (oyster shell), 500 mg, Oral, Daily  castor  oil-balsam peru, 1 application , Topical, Q12H  cholecalciferol, 2,000 Units, Oral, Daily  DULoxetine, 60 mg, Oral, BID  famotidine, 40 mg, Oral, QAM  ferrous sulfate, 325 mg, Oral, BID With Meals  finasteride, 5 mg, Oral, Daily  folic acid, 1 mg, Oral, Daily  gabapentin, 300 mg, Oral, Q12H  hydrocortisone-bacitracin-zinc oxide-nystatin, 1 application , Topical, Q12H  insulin glargine, 50 Units, Subcutaneous, Daily  insulin lispro, 2-7 Units, Subcutaneous, 4x Daily AC & at Bedtime  insulin lispro, 8 Units, Subcutaneous, TID AC  magnesium oxide, 400 mg, Oral, Daily  metoprolol tartrate, 100 mg, Oral, BID  montelukast, 10 mg, Oral, Nightly  mupirocin, 1 application , Topical, Q12H  NIFEdipine XL, 30 mg, Oral, QAM  O2, 2 L/min, Inhalation, Once  senna-docusate sodium, 2 tablet, Oral, BID  sodium chloride, 10 mL, Intravenous, Q12H    Infusions   Diet  Diet: Cardiac Diets; Healthy Heart (2-3 Na+); Texture: Regular Texture (IDDSI 7); Fluid Consistency: Thin (IDDSI 0)    I have personally reviewed:  [x]  Laboratory   [x]  Microbiology   [x]  Radiology   [x]  EKG/Telemetry  [x]  Cardiology/Vascular   []  Pathology    []  Records      Assessment/Plan     Active Hospital Problems    Diagnosis  POA    **Closed fracture of left tibial plateau [S82.142A]  Yes    Essential hypertension [I10]  Yes    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       58 y.o. male admitted with Closed fracture of left tibial plateau.    Assessment and plan  1.  Closed fracture of left tibia plateau, management per Ortho.    2.  Hypertension, diabetes, COPD, chronic conditions.    3. Paroxysmal atrial fibrillation, patient is currently  rate controlled.    4.  Morbid obesity, complicates aspects of care.    5.  CODE STATUS is full code.  Further plans based on hospital course.          Shabbir Peraza MD  [facility]  12/19/23  16:58 EST      Electronically signed by Shabbir Peraza MD at 12/19/23 1659       Consult Notes (last 72 hours)  Notes from 12/18/23 1330 through 12/21/23 1330   No notes of this type exist for this encounter.

## 2023-12-21 NOTE — NURSING NOTE
Put a call out to IV team this morning regarding the need for patient's port needle to be changed today. Waiting on response. Will continue to monitor.

## 2023-12-22 ENCOUNTER — APPOINTMENT (OUTPATIENT)
Dept: GENERAL RADIOLOGY | Facility: HOSPITAL | Age: 58
DRG: 853 | End: 2023-12-22
Payer: COMMERCIAL

## 2023-12-22 ENCOUNTER — ANESTHESIA EVENT (OUTPATIENT)
Dept: PERIOP | Facility: HOSPITAL | Age: 58
End: 2023-12-22
Payer: COMMERCIAL

## 2023-12-22 ENCOUNTER — ANESTHESIA (OUTPATIENT)
Dept: PERIOP | Facility: HOSPITAL | Age: 58
End: 2023-12-22
Payer: COMMERCIAL

## 2023-12-22 LAB
ABO GROUP BLD: NORMAL
ALBUMIN SERPL-MCNC: 2.7 G/DL (ref 3.5–5.2)
ALBUMIN/GLOB SERPL: 0.7 G/DL
ALP SERPL-CCNC: 149 U/L (ref 39–117)
ALT SERPL W P-5'-P-CCNC: 16 U/L (ref 1–41)
ANION GAP SERPL CALCULATED.3IONS-SCNC: 7 MMOL/L (ref 5–15)
AST SERPL-CCNC: 15 U/L (ref 1–40)
BASOPHILS # BLD AUTO: 0.07 10*3/MM3 (ref 0–0.2)
BASOPHILS NFR BLD AUTO: 0.7 % (ref 0–1.5)
BILIRUB SERPL-MCNC: 0.3 MG/DL (ref 0–1.2)
BLD GP AB SCN SERPL QL: NEGATIVE
BUN SERPL-MCNC: 26 MG/DL (ref 6–20)
BUN/CREAT SERPL: 17.1 (ref 7–25)
CALCIUM SPEC-SCNC: 9 MG/DL (ref 8.6–10.5)
CHLORIDE SERPL-SCNC: 99 MMOL/L (ref 98–107)
CO2 SERPL-SCNC: 30 MMOL/L (ref 22–29)
CREAT SERPL-MCNC: 1.52 MG/DL (ref 0.76–1.27)
DEPRECATED RDW RBC AUTO: 40.5 FL (ref 37–54)
EGFRCR SERPLBLD CKD-EPI 2021: 52.8 ML/MIN/1.73
EOSINOPHIL # BLD AUTO: 0.55 10*3/MM3 (ref 0–0.4)
EOSINOPHIL NFR BLD AUTO: 5.4 % (ref 0.3–6.2)
ERYTHROCYTE [DISTWIDTH] IN BLOOD BY AUTOMATED COUNT: 13 % (ref 12.3–15.4)
GLOBULIN UR ELPH-MCNC: 3.9 GM/DL
GLUCOSE BLDC GLUCOMTR-MCNC: 114 MG/DL (ref 70–130)
GLUCOSE BLDC GLUCOMTR-MCNC: 183 MG/DL (ref 70–130)
GLUCOSE BLDC GLUCOMTR-MCNC: 224 MG/DL (ref 70–130)
GLUCOSE BLDC GLUCOMTR-MCNC: 77 MG/DL (ref 70–130)
GLUCOSE BLDC GLUCOMTR-MCNC: 97 MG/DL (ref 70–130)
GLUCOSE SERPL-MCNC: 110 MG/DL (ref 65–99)
HCT VFR BLD AUTO: 24.7 % (ref 37.5–51)
HGB BLD-MCNC: 7.6 G/DL (ref 13–17.7)
IMM GRANULOCYTES # BLD AUTO: 0.03 10*3/MM3 (ref 0–0.05)
IMM GRANULOCYTES NFR BLD AUTO: 0.3 % (ref 0–0.5)
LYMPHOCYTES # BLD AUTO: 2.26 10*3/MM3 (ref 0.7–3.1)
LYMPHOCYTES NFR BLD AUTO: 22 % (ref 19.6–45.3)
MCH RBC QN AUTO: 26.7 PG (ref 26.6–33)
MCHC RBC AUTO-ENTMCNC: 30.8 G/DL (ref 31.5–35.7)
MCV RBC AUTO: 86.7 FL (ref 79–97)
MONOCYTES # BLD AUTO: 1.19 10*3/MM3 (ref 0.1–0.9)
MONOCYTES NFR BLD AUTO: 11.6 % (ref 5–12)
NEUTROPHILS NFR BLD AUTO: 6.18 10*3/MM3 (ref 1.7–7)
NEUTROPHILS NFR BLD AUTO: 60 % (ref 42.7–76)
NRBC BLD AUTO-RTO: 0 /100 WBC (ref 0–0.2)
PLATELET # BLD AUTO: 477 10*3/MM3 (ref 140–450)
PMV BLD AUTO: 8.5 FL (ref 6–12)
POTASSIUM SERPL-SCNC: 4.5 MMOL/L (ref 3.5–5.2)
PROT SERPL-MCNC: 6.6 G/DL (ref 6–8.5)
RBC # BLD AUTO: 2.85 10*6/MM3 (ref 4.14–5.8)
RH BLD: NEGATIVE
SODIUM SERPL-SCNC: 136 MMOL/L (ref 136–145)
T&S EXPIRATION DATE: NORMAL
WBC NRBC COR # BLD AUTO: 10.28 10*3/MM3 (ref 3.4–10.8)

## 2023-12-22 PROCEDURE — 86850 RBC ANTIBODY SCREEN: CPT | Performed by: ANESTHESIOLOGY

## 2023-12-22 PROCEDURE — C1713 ANCHOR/SCREW BN/BN,TIS/BN: HCPCS | Performed by: ORTHOPAEDIC SURGERY

## 2023-12-22 PROCEDURE — 25010000002 CEFAZOLIN PER 500 MG: Performed by: ORTHOPAEDIC SURGERY

## 2023-12-22 PROCEDURE — 25010000002 SUCCINYLCHOLINE PER 20 MG: Performed by: NURSE ANESTHETIST, CERTIFIED REGISTERED

## 2023-12-22 PROCEDURE — 94664 DEMO&/EVAL PT USE INHALER: CPT

## 2023-12-22 PROCEDURE — 94761 N-INVAS EAR/PLS OXIMETRY MLT: CPT

## 2023-12-22 PROCEDURE — 0QSH04Z REPOSITION LEFT TIBIA WITH INTERNAL FIXATION DEVICE, OPEN APPROACH: ICD-10-PCS | Performed by: ORTHOPAEDIC SURGERY

## 2023-12-22 PROCEDURE — 25010000002 NEOSTIGMINE 5 MG/10ML SOLUTION: Performed by: NURSE ANESTHETIST, CERTIFIED REGISTERED

## 2023-12-22 PROCEDURE — 82948 REAGENT STRIP/BLOOD GLUCOSE: CPT

## 2023-12-22 PROCEDURE — 94799 UNLISTED PULMONARY SVC/PX: CPT

## 2023-12-22 PROCEDURE — 63710000001 INSULIN GLARGINE PER 5 UNITS: Performed by: INTERNAL MEDICINE

## 2023-12-22 PROCEDURE — 25010000002 MIDAZOLAM PER 1 MG: Performed by: ANESTHESIOLOGY

## 2023-12-22 PROCEDURE — P9016 RBC LEUKOCYTES REDUCED: HCPCS

## 2023-12-22 PROCEDURE — 25010000002 FENTANYL CITRATE (PF) 50 MCG/ML SOLUTION: Performed by: NURSE ANESTHETIST, CERTIFIED REGISTERED

## 2023-12-22 PROCEDURE — 25010000002 PROPOFOL 10 MG/ML EMULSION: Performed by: NURSE ANESTHETIST, CERTIFIED REGISTERED

## 2023-12-22 PROCEDURE — 36430 TRANSFUSION BLD/BLD COMPNT: CPT

## 2023-12-22 PROCEDURE — 94760 N-INVAS EAR/PLS OXIMETRY 1: CPT

## 2023-12-22 PROCEDURE — 73590 X-RAY EXAM OF LOWER LEG: CPT

## 2023-12-22 PROCEDURE — 80053 COMPREHEN METABOLIC PANEL: CPT | Performed by: INTERNAL MEDICINE

## 2023-12-22 PROCEDURE — 86900 BLOOD TYPING SEROLOGIC ABO: CPT

## 2023-12-22 PROCEDURE — 25010000002 DEXAMETHASONE PER 1 MG: Performed by: NURSE ANESTHETIST, CERTIFIED REGISTERED

## 2023-12-22 PROCEDURE — 63710000001 INSULIN LISPRO (HUMAN) PER 5 UNITS: Performed by: ORTHOPAEDIC SURGERY

## 2023-12-22 PROCEDURE — 25010000002 VANCOMYCIN 10 G RECONSTITUTED SOLUTION: Performed by: ORTHOPAEDIC SURGERY

## 2023-12-22 PROCEDURE — 25010000002 HYDROMORPHONE PER 4 MG: Performed by: NURSE ANESTHETIST, CERTIFIED REGISTERED

## 2023-12-22 PROCEDURE — 25010000002 FENTANYL CITRATE (PF) 50 MCG/ML SOLUTION: Performed by: ANESTHESIOLOGY

## 2023-12-22 PROCEDURE — 25010000002 ROPIVACAINE PER 1 MG: Performed by: ANESTHESIOLOGY

## 2023-12-22 PROCEDURE — 86900 BLOOD TYPING SEROLOGIC ABO: CPT | Performed by: ANESTHESIOLOGY

## 2023-12-22 PROCEDURE — 25810000003 LACTATED RINGERS PER 1000 ML: Performed by: ANESTHESIOLOGY

## 2023-12-22 PROCEDURE — 86901 BLOOD TYPING SEROLOGIC RH(D): CPT | Performed by: ANESTHESIOLOGY

## 2023-12-22 PROCEDURE — 25810000003 SODIUM CHLORIDE 0.9 % SOLUTION: Performed by: ORTHOPAEDIC SURGERY

## 2023-12-22 PROCEDURE — 86923 COMPATIBILITY TEST ELECTRIC: CPT

## 2023-12-22 PROCEDURE — 85025 COMPLETE CBC W/AUTO DIFF WBC: CPT | Performed by: INTERNAL MEDICINE

## 2023-12-22 PROCEDURE — 25010000002 CEFAZOLIN IN DEXTROSE 2-4 GM/100ML-% SOLUTION: Performed by: ORTHOPAEDIC SURGERY

## 2023-12-22 PROCEDURE — 25010000002 ONDANSETRON PER 1 MG: Performed by: NURSE ANESTHETIST, CERTIFIED REGISTERED

## 2023-12-22 PROCEDURE — 76000 FLUOROSCOPY <1 HR PHYS/QHP: CPT

## 2023-12-22 DEVICE — KNOTLESS TISSUE CONTROL DEVICE, UNDYED UNIDIRECTIONAL (ANTIBACTERIAL) SYNTHETIC ABSORBABLE DEVICE
Type: IMPLANTABLE DEVICE | Site: LEG | Status: FUNCTIONAL
Brand: STRATAFIX

## 2023-12-22 DEVICE — LOCKING SCREW
Type: IMPLANTABLE DEVICE | Site: LEG | Status: FUNCTIONAL
Brand: AXSOS

## 2023-12-22 DEVICE — PROXIMAL LATERAL TIBIA PLATE
Type: IMPLANTABLE DEVICE | Site: LEG | Status: FUNCTIONAL
Brand: AXSOS

## 2023-12-22 RX ORDER — MAGNESIUM HYDROXIDE 1200 MG/15ML
LIQUID ORAL AS NEEDED
Status: DISCONTINUED | OUTPATIENT
Start: 2023-12-22 | End: 2023-12-22 | Stop reason: HOSPADM

## 2023-12-22 RX ORDER — MIDAZOLAM HYDROCHLORIDE 1 MG/ML
1 INJECTION INTRAMUSCULAR; INTRAVENOUS
Status: DISCONTINUED | OUTPATIENT
Start: 2023-12-22 | End: 2023-12-22 | Stop reason: HOSPADM

## 2023-12-22 RX ORDER — SUCCINYLCHOLINE CHLORIDE 20 MG/ML
INJECTION INTRAMUSCULAR; INTRAVENOUS AS NEEDED
Status: DISCONTINUED | OUTPATIENT
Start: 2023-12-22 | End: 2023-12-22 | Stop reason: SURG

## 2023-12-22 RX ORDER — FAMOTIDINE 10 MG/ML
20 INJECTION, SOLUTION INTRAVENOUS ONCE
Status: COMPLETED | OUTPATIENT
Start: 2023-12-22 | End: 2023-12-22

## 2023-12-22 RX ORDER — ROCURONIUM BROMIDE 10 MG/ML
INJECTION, SOLUTION INTRAVENOUS AS NEEDED
Status: DISCONTINUED | OUTPATIENT
Start: 2023-12-22 | End: 2023-12-22 | Stop reason: SURG

## 2023-12-22 RX ORDER — SODIUM CHLORIDE, SODIUM LACTATE, POTASSIUM CHLORIDE, CALCIUM CHLORIDE 600; 310; 30; 20 MG/100ML; MG/100ML; MG/100ML; MG/100ML
9 INJECTION, SOLUTION INTRAVENOUS CONTINUOUS
Status: DISCONTINUED | OUTPATIENT
Start: 2023-12-22 | End: 2023-12-22

## 2023-12-22 RX ORDER — DROPERIDOL 2.5 MG/ML
0.62 INJECTION, SOLUTION INTRAMUSCULAR; INTRAVENOUS
Status: DISCONTINUED | OUTPATIENT
Start: 2023-12-22 | End: 2023-12-22 | Stop reason: HOSPADM

## 2023-12-22 RX ORDER — HYDRALAZINE HYDROCHLORIDE 20 MG/ML
5 INJECTION INTRAMUSCULAR; INTRAVENOUS
Status: DISCONTINUED | OUTPATIENT
Start: 2023-12-22 | End: 2023-12-22 | Stop reason: HOSPADM

## 2023-12-22 RX ORDER — FENTANYL CITRATE 50 UG/ML
25 INJECTION, SOLUTION INTRAMUSCULAR; INTRAVENOUS
Status: DISCONTINUED | OUTPATIENT
Start: 2023-12-22 | End: 2023-12-22 | Stop reason: HOSPADM

## 2023-12-22 RX ORDER — FENTANYL CITRATE 50 UG/ML
50 INJECTION, SOLUTION INTRAMUSCULAR; INTRAVENOUS ONCE AS NEEDED
Status: DISCONTINUED | OUTPATIENT
Start: 2023-12-22 | End: 2023-12-22 | Stop reason: HOSPADM

## 2023-12-22 RX ORDER — DIPHENHYDRAMINE HYDROCHLORIDE 50 MG/ML
12.5 INJECTION INTRAMUSCULAR; INTRAVENOUS
Status: DISCONTINUED | OUTPATIENT
Start: 2023-12-22 | End: 2023-12-22 | Stop reason: HOSPADM

## 2023-12-22 RX ORDER — SODIUM CHLORIDE 0.9 % (FLUSH) 0.9 %
3 SYRINGE (ML) INJECTION EVERY 12 HOURS SCHEDULED
Status: DISCONTINUED | OUTPATIENT
Start: 2023-12-22 | End: 2023-12-22 | Stop reason: HOSPADM

## 2023-12-22 RX ORDER — LABETALOL HYDROCHLORIDE 5 MG/ML
5 INJECTION, SOLUTION INTRAVENOUS
Status: DISCONTINUED | OUTPATIENT
Start: 2023-12-22 | End: 2023-12-22 | Stop reason: HOSPADM

## 2023-12-22 RX ORDER — HYDROMORPHONE HYDROCHLORIDE 1 MG/ML
0.25 INJECTION, SOLUTION INTRAMUSCULAR; INTRAVENOUS; SUBCUTANEOUS
Status: DISCONTINUED | OUTPATIENT
Start: 2023-12-22 | End: 2023-12-22 | Stop reason: HOSPADM

## 2023-12-22 RX ORDER — ONDANSETRON 2 MG/ML
INJECTION INTRAMUSCULAR; INTRAVENOUS AS NEEDED
Status: DISCONTINUED | OUTPATIENT
Start: 2023-12-22 | End: 2023-12-22 | Stop reason: SURG

## 2023-12-22 RX ORDER — PROMETHAZINE HYDROCHLORIDE 25 MG/1
25 TABLET ORAL ONCE AS NEEDED
Status: DISCONTINUED | OUTPATIENT
Start: 2023-12-22 | End: 2023-12-22 | Stop reason: HOSPADM

## 2023-12-22 RX ORDER — VANCOMYCIN 2 GRAM/500 ML IN 0.9 % SODIUM CHLORIDE INTRAVENOUS
15 ONCE
Status: COMPLETED | OUTPATIENT
Start: 2023-12-22 | End: 2023-12-22

## 2023-12-22 RX ORDER — CEFAZOLIN SODIUM 2 G/100ML
2000 INJECTION, SOLUTION INTRAVENOUS EVERY 8 HOURS
Qty: 200 ML | Refills: 0 | Status: COMPLETED | OUTPATIENT
Start: 2023-12-22 | End: 2023-12-23

## 2023-12-22 RX ORDER — GLYCOPYRROLATE 0.2 MG/ML
INJECTION INTRAMUSCULAR; INTRAVENOUS AS NEEDED
Status: DISCONTINUED | OUTPATIENT
Start: 2023-12-22 | End: 2023-12-22 | Stop reason: SURG

## 2023-12-22 RX ORDER — HYDROCODONE BITARTRATE AND ACETAMINOPHEN 5; 325 MG/1; MG/1
1 TABLET ORAL ONCE AS NEEDED
Status: DISCONTINUED | OUTPATIENT
Start: 2023-12-22 | End: 2023-12-22 | Stop reason: HOSPADM

## 2023-12-22 RX ORDER — FLUMAZENIL 0.1 MG/ML
0.2 INJECTION INTRAVENOUS AS NEEDED
Status: DISCONTINUED | OUTPATIENT
Start: 2023-12-22 | End: 2023-12-22 | Stop reason: HOSPADM

## 2023-12-22 RX ORDER — CEFAZOLIN SODIUM IN 0.9 % NACL 3 G/100 ML
3 INTRAVENOUS SOLUTION, PIGGYBACK (ML) INTRAVENOUS ONCE
Status: COMPLETED | OUTPATIENT
Start: 2023-12-22 | End: 2023-12-22

## 2023-12-22 RX ORDER — MIDAZOLAM HYDROCHLORIDE 1 MG/ML
2 INJECTION INTRAMUSCULAR; INTRAVENOUS ONCE
Status: DISCONTINUED | OUTPATIENT
Start: 2023-12-22 | End: 2023-12-22 | Stop reason: HOSPADM

## 2023-12-22 RX ORDER — LIDOCAINE HYDROCHLORIDE 20 MG/ML
INJECTION, SOLUTION INFILTRATION; PERINEURAL AS NEEDED
Status: DISCONTINUED | OUTPATIENT
Start: 2023-12-22 | End: 2023-12-22 | Stop reason: SURG

## 2023-12-22 RX ORDER — NALOXONE HCL 0.4 MG/ML
0.2 VIAL (ML) INJECTION AS NEEDED
Status: DISCONTINUED | OUTPATIENT
Start: 2023-12-22 | End: 2023-12-22 | Stop reason: HOSPADM

## 2023-12-22 RX ORDER — VANCOMYCIN 2 GRAM/500 ML IN 0.9 % SODIUM CHLORIDE INTRAVENOUS
15 ONCE
Status: COMPLETED | OUTPATIENT
Start: 2023-12-23 | End: 2023-12-23

## 2023-12-22 RX ORDER — EPHEDRINE SULFATE 50 MG/ML
5 INJECTION, SOLUTION INTRAVENOUS ONCE AS NEEDED
Status: DISCONTINUED | OUTPATIENT
Start: 2023-12-22 | End: 2023-12-22 | Stop reason: HOSPADM

## 2023-12-22 RX ORDER — LIDOCAINE HYDROCHLORIDE 10 MG/ML
0.5 INJECTION, SOLUTION INFILTRATION; PERINEURAL ONCE AS NEEDED
Status: DISCONTINUED | OUTPATIENT
Start: 2023-12-22 | End: 2023-12-22 | Stop reason: HOSPADM

## 2023-12-22 RX ORDER — SODIUM CHLORIDE 0.9 % (FLUSH) 0.9 %
3-10 SYRINGE (ML) INJECTION AS NEEDED
Status: DISCONTINUED | OUTPATIENT
Start: 2023-12-22 | End: 2023-12-22 | Stop reason: HOSPADM

## 2023-12-22 RX ORDER — ROPIVACAINE HYDROCHLORIDE 5 MG/ML
INJECTION, SOLUTION EPIDURAL; INFILTRATION; PERINEURAL
Status: COMPLETED | OUTPATIENT
Start: 2023-12-22 | End: 2023-12-22

## 2023-12-22 RX ORDER — NEOSTIGMINE METHYLSULFATE 0.5 MG/ML
INJECTION, SOLUTION INTRAVENOUS AS NEEDED
Status: DISCONTINUED | OUTPATIENT
Start: 2023-12-22 | End: 2023-12-22 | Stop reason: SURG

## 2023-12-22 RX ORDER — PROPOFOL 10 MG/ML
VIAL (ML) INTRAVENOUS AS NEEDED
Status: DISCONTINUED | OUTPATIENT
Start: 2023-12-22 | End: 2023-12-22 | Stop reason: SURG

## 2023-12-22 RX ORDER — ROPIVACAINE HYDROCHLORIDE 5 MG/ML
INJECTION, SOLUTION EPIDURAL; INFILTRATION; PERINEURAL
Status: COMPLETED
Start: 2023-12-22 | End: 2023-12-22

## 2023-12-22 RX ORDER — DEXAMETHASONE SODIUM PHOSPHATE 4 MG/ML
INJECTION, SOLUTION INTRA-ARTICULAR; INTRALESIONAL; INTRAMUSCULAR; INTRAVENOUS; SOFT TISSUE AS NEEDED
Status: DISCONTINUED | OUTPATIENT
Start: 2023-12-22 | End: 2023-12-22 | Stop reason: SURG

## 2023-12-22 RX ORDER — FENTANYL CITRATE 50 UG/ML
50 INJECTION, SOLUTION INTRAMUSCULAR; INTRAVENOUS ONCE
Status: COMPLETED | OUTPATIENT
Start: 2023-12-22 | End: 2023-12-22

## 2023-12-22 RX ORDER — HYDROCODONE BITARTRATE AND ACETAMINOPHEN 7.5; 325 MG/1; MG/1
1 TABLET ORAL EVERY 4 HOURS PRN
Status: DISCONTINUED | OUTPATIENT
Start: 2023-12-22 | End: 2023-12-22 | Stop reason: HOSPADM

## 2023-12-22 RX ORDER — IPRATROPIUM BROMIDE AND ALBUTEROL SULFATE 2.5; .5 MG/3ML; MG/3ML
3 SOLUTION RESPIRATORY (INHALATION) ONCE AS NEEDED
Status: DISCONTINUED | OUTPATIENT
Start: 2023-12-22 | End: 2023-12-22 | Stop reason: HOSPADM

## 2023-12-22 RX ORDER — SODIUM CHLORIDE 9 MG/ML
INJECTION, SOLUTION INTRAVENOUS CONTINUOUS PRN
Status: DISCONTINUED | OUTPATIENT
Start: 2023-12-22 | End: 2023-12-22 | Stop reason: SURG

## 2023-12-22 RX ORDER — ONDANSETRON 2 MG/ML
4 INJECTION INTRAMUSCULAR; INTRAVENOUS ONCE AS NEEDED
Status: DISCONTINUED | OUTPATIENT
Start: 2023-12-22 | End: 2023-12-22 | Stop reason: HOSPADM

## 2023-12-22 RX ORDER — PROMETHAZINE HYDROCHLORIDE 25 MG/1
25 SUPPOSITORY RECTAL ONCE AS NEEDED
Status: DISCONTINUED | OUTPATIENT
Start: 2023-12-22 | End: 2023-12-22 | Stop reason: HOSPADM

## 2023-12-22 RX ORDER — FENTANYL CITRATE 50 UG/ML
INJECTION, SOLUTION INTRAMUSCULAR; INTRAVENOUS AS NEEDED
Status: DISCONTINUED | OUTPATIENT
Start: 2023-12-22 | End: 2023-12-22 | Stop reason: SURG

## 2023-12-22 RX ADMIN — METOPROLOL TARTRATE 100 MG: 50 TABLET, FILM COATED ORAL at 10:33

## 2023-12-22 RX ADMIN — CASTOR OIL AND BALSAM, PERU 1 APPLICATION: 788; 87 OINTMENT TOPICAL at 21:14

## 2023-12-22 RX ADMIN — FENTANYL CITRATE 50 MCG: 50 INJECTION, SOLUTION INTRAMUSCULAR; INTRAVENOUS at 11:58

## 2023-12-22 RX ADMIN — SODIUM CHLORIDE: 9 INJECTION, SOLUTION INTRAVENOUS at 12:46

## 2023-12-22 RX ADMIN — ONDANSETRON 4 MG: 2 INJECTION INTRAMUSCULAR; INTRAVENOUS at 13:37

## 2023-12-22 RX ADMIN — DULOXETINE HYDROCHLORIDE 60 MG: 60 CAPSULE, DELAYED RELEASE ORAL at 21:13

## 2023-12-22 RX ADMIN — MIDAZOLAM HYDROCHLORIDE 1 MG: 1 INJECTION, SOLUTION INTRAMUSCULAR; INTRAVENOUS at 11:58

## 2023-12-22 RX ADMIN — INSULIN GLARGINE 50 UNITS: 100 INJECTION, SOLUTION SUBCUTANEOUS at 09:52

## 2023-12-22 RX ADMIN — HYDROMORPHONE HYDROCHLORIDE 0.25 MG: 1 INJECTION, SOLUTION INTRAMUSCULAR; INTRAVENOUS; SUBCUTANEOUS at 15:16

## 2023-12-22 RX ADMIN — MUPIROCIN 1 APPLICATION: 20 OINTMENT TOPICAL at 16:51

## 2023-12-22 RX ADMIN — ROCURONIUM BROMIDE 35 MG: 10 INJECTION, SOLUTION INTRAVENOUS at 12:25

## 2023-12-22 RX ADMIN — ROPIVACAINE HYDROCHLORIDE 30 ML: 5 INJECTION EPIDURAL; INFILTRATION; PERINEURAL at 12:04

## 2023-12-22 RX ADMIN — SODIUM CHLORIDE, POTASSIUM CHLORIDE, SODIUM LACTATE AND CALCIUM CHLORIDE 9 ML/HR: 600; 310; 30; 20 INJECTION, SOLUTION INTRAVENOUS at 11:45

## 2023-12-22 RX ADMIN — CEFAZOLIN SODIUM 2000 MG: 2 INJECTION, SOLUTION INTRAVENOUS at 21:13

## 2023-12-22 RX ADMIN — CEFAZOLIN 3 G: 10 INJECTION, POWDER, FOR SOLUTION INTRAVENOUS at 12:04

## 2023-12-22 RX ADMIN — FAMOTIDINE 20 MG: 10 INJECTION INTRAVENOUS at 11:44

## 2023-12-22 RX ADMIN — METOPROLOL TARTRATE 100 MG: 50 TABLET, FILM COATED ORAL at 21:41

## 2023-12-22 RX ADMIN — BUDESONIDE AND FORMOTEROL FUMARATE DIHYDRATE 2 PUFF: 160; 4.5 AEROSOL RESPIRATORY (INHALATION) at 19:43

## 2023-12-22 RX ADMIN — Medication 10 ML: at 09:00

## 2023-12-22 RX ADMIN — ATORVASTATIN CALCIUM 20 MG: 20 TABLET, FILM COATED ORAL at 21:13

## 2023-12-22 RX ADMIN — GABAPENTIN 300 MG: 300 CAPSULE ORAL at 21:13

## 2023-12-22 RX ADMIN — MUPIROCIN 1 APPLICATION: 20 OINTMENT TOPICAL at 21:14

## 2023-12-22 RX ADMIN — Medication 10 ML: at 21:15

## 2023-12-22 RX ADMIN — ZINC OXIDE 1 APPLICATION: 200 OINTMENT TOPICAL at 16:51

## 2023-12-22 RX ADMIN — FOLIC ACID 1 MG: 1 TABLET ORAL at 16:23

## 2023-12-22 RX ADMIN — GLYCOPYRROLATE 0.4 MCG: 0.2 INJECTION INTRAMUSCULAR; INTRAVENOUS at 13:50

## 2023-12-22 RX ADMIN — VANCOMYCIN HYDROCHLORIDE 2000 MG: 10 INJECTION, POWDER, LYOPHILIZED, FOR SOLUTION INTRAVENOUS at 11:34

## 2023-12-22 RX ADMIN — SUCCINYLCHOLINE CHLORIDE 120 MG: 20 INJECTION, SOLUTION INTRAMUSCULAR; INTRAVENOUS; PARENTERAL at 12:21

## 2023-12-22 RX ADMIN — NEOSTIGMINE METHYLSULFATE 3 MG: 0.5 INJECTION INTRAVENOUS at 13:50

## 2023-12-22 RX ADMIN — PROPOFOL 150 MG: 10 INJECTION, EMULSION INTRAVENOUS at 12:21

## 2023-12-22 RX ADMIN — Medication 2000 UNITS: at 16:24

## 2023-12-22 RX ADMIN — CASTOR OIL AND BALSAM, PERU 1 APPLICATION: 788; 87 OINTMENT TOPICAL at 16:23

## 2023-12-22 RX ADMIN — ROCURONIUM BROMIDE 5 MG: 10 INJECTION, SOLUTION INTRAVENOUS at 12:21

## 2023-12-22 RX ADMIN — Medication 500 MG: at 16:24

## 2023-12-22 RX ADMIN — FENTANYL CITRATE 50 MCG: 50 INJECTION, SOLUTION INTRAMUSCULAR; INTRAVENOUS at 12:54

## 2023-12-22 RX ADMIN — DEXAMETHASONE SODIUM PHOSPHATE 4 MG: 4 INJECTION, SOLUTION INTRA-ARTICULAR; INTRALESIONAL; INTRAMUSCULAR; INTRAVENOUS; SOFT TISSUE at 12:30

## 2023-12-22 RX ADMIN — MONTELUKAST SODIUM 10 MG: 10 TABLET, FILM COATED ORAL at 21:14

## 2023-12-22 RX ADMIN — ZINC OXIDE 1 APPLICATION: 200 OINTMENT TOPICAL at 21:14

## 2023-12-22 RX ADMIN — INSULIN LISPRO 3 UNITS: 100 INJECTION, SOLUTION INTRAVENOUS; SUBCUTANEOUS at 21:14

## 2023-12-22 RX ADMIN — FERROUS SULFATE TAB 325 MG (65 MG ELEMENTAL FE) 325 MG: 325 (65 FE) TAB at 16:24

## 2023-12-22 RX ADMIN — FENTANYL CITRATE 25 MCG: 50 INJECTION, SOLUTION INTRAMUSCULAR; INTRAVENOUS at 14:51

## 2023-12-22 RX ADMIN — FINASTERIDE 5 MG: 5 TABLET, FILM COATED ORAL at 16:24

## 2023-12-22 RX ADMIN — MAGNESIUM OXIDE 400 MG (241.3 MG MAGNESIUM) TABLET 400 MG: TABLET at 16:23

## 2023-12-22 RX ADMIN — LIDOCAINE HYDROCHLORIDE 100 MG: 20 INJECTION, SOLUTION INFILTRATION; PERINEURAL at 12:21

## 2023-12-22 RX ADMIN — HYDROMORPHONE HYDROCHLORIDE 0.25 MG: 1 INJECTION, SOLUTION INTRAMUSCULAR; INTRAVENOUS; SUBCUTANEOUS at 14:26

## 2023-12-22 RX ADMIN — BUDESONIDE AND FORMOTEROL FUMARATE DIHYDRATE 2 PUFF: 160; 4.5 AEROSOL RESPIRATORY (INHALATION) at 07:55

## 2023-12-22 NOTE — PROGRESS NOTES
"  Orthopedic Progress Note      Patient: Preston Wallis  YOB: 1965     Date of Admission: 12/14/2023  2:35 PM Medical Record Number: [id number]     Attending Physician: Shabbir Peraza MD    Planned Procedure:  Procedure(s):  TIBIAL PLATEAU OPEN REDUCTION INTERNAL FIXATION (Awaiting Surgery)    Subjective : No new orthopaedic complaints     Pain Relief: some relief with present medication.     Systemic Complaints: No Complaints  Vitals:    12/21/23 2027 12/21/23 2105 12/22/23 0518 12/22/23 0755   BP:  139/74 159/76    BP Location:  Left arm Left arm    Patient Position:  Lying Lying    Pulse: 74 74 78 82   Resp: 16 16 16 16   Temp:  97.5 °F (36.4 °C) 97.8 °F (36.6 °C)    TempSrc:  Oral Oral    SpO2: 96% 95% 92% 98%   Weight:       Height:           Physical Exam: 58 y.o. male    General Appearance:       Alert, cooperative, in no acute distress                  Extremities:   Left leg swelling decreased, blisters resolved, compartments soft          No clinical sign of DVT        Able to do good movements of digits    Pulses:   Pulses palpable and equal bilaterally           Diagnostic Tests:    Results from last 7 days   Lab Units 12/22/23  0431 12/21/23  0538 12/20/23  0516   WBC 10*3/mm3 10.28 10.22 10.52   HEMOGLOBIN g/dL 7.6* 8.1* 7.9*   HEMATOCRIT % 24.7* 25.4* 24.7*   PLATELETS 10*3/mm3 477* 430 375     Results from last 7 days   Lab Units 12/22/23  0431 12/21/23  0538 12/20/23  0516   SODIUM mmol/L 136 136 138   POTASSIUM mmol/L 4.5 4.8 5.1   CHLORIDE mmol/L 99 99 102   CO2 mmol/L 30.0* 28.5 25.0   BUN mg/dL 26* 31* 26*   CREATININE mg/dL 1.52* 1.74* 1.48*   GLUCOSE mg/dL 110* 170* 132*   CALCIUM mg/dL 9.0 9.1 9.4         Lab Results   Component Value Date    URICACID 4.2 12/13/2023     No results found for: \"CRYSTAL\"  Microbiology Results (last 10 days)       ** No results found for the last 240 hours. **          CT Lower Extremity Left Without Contrast    Result Date: " 12/15/2023  Comminuted, impacted, minimally displaced proximal left tibial diametaphysis fracture. There is also an acute, comminuted, intra-articular fracture involving the fibular head and proximal metaphysis.  There is a retrograde nail in the distal femur which extends 10 mm beyond the articular cortex of the femoral trochlea. Advanced osteoarthritic change at the knee with an old, healed fracture along the posterior aspect of the medial tibial plateau. No acute intra-articular fracture at the knee.    Radiation dose reduction techniques were utilized, including automated exposure control and exposure modulation based on body size.   This report was finalized on 12/15/2023 4:20 PM by Dr. Iam Gaston M.D on Workstation: [id number]      XR Knee 1 or 2 View Left    Result Date: 12/14/2023   Proximal left tibia and fibula fractures. Degenerative, chronic, and postsurgical changes.    This report was finalized on 12/14/2023 3:32 PM by Dr. Linden Magallon M.D on Workstation: [id number]      XR Tibia Fibula 2 View Left    Result Date: 12/14/2023   Proximal left tibia and fibula fractures. Degenerative, chronic, and postsurgical changes.    This report was finalized on 12/14/2023 3:32 PM by Dr. Linden Magallon M.D on Workstation: [id number]             Current Medications:  Scheduled Meds:[Held by provider] apixaban, 5 mg, Oral, Q12H  atorvastatin, 20 mg, Oral, Nightly  budesonide-formoterol, 2 puff, Inhalation, BID - RT  calcium carbonate (oyster shell), 500 mg, Oral, Daily  castor oil-balsam peru, 1 application , Topical, Q12H  cholecalciferol, 2,000 Units, Oral, Daily  DULoxetine, 60 mg, Oral, BID  famotidine, 40 mg, Oral, QAM  ferrous sulfate, 325 mg, Oral, BID With Meals  finasteride, 5 mg, Oral, Daily  folic acid, 1 mg, Oral, Daily  gabapentin, 300 mg, Oral, Q12H  hydrocortisone-bacitracin-zinc oxide-nystatin, 1 application , Topical, Q12H  insulin glargine, 50 Units, Subcutaneous, Daily  insulin lispro, 2-7  Units, Subcutaneous, 4x Daily AC & at Bedtime  insulin lispro, 8 Units, Subcutaneous, TID AC  magnesium oxide, 400 mg, Oral, Daily  metoprolol tartrate, 100 mg, Oral, BID  montelukast, 10 mg, Oral, Nightly  mupirocin, 1 application , Topical, Q12H  NIFEdipine XL, 30 mg, Oral, QAM  O2, 2 L/min, Inhalation, Once  senna-docusate sodium, 2 tablet, Oral, BID  sodium chloride, 10 mL, Intravenous, Q12H      Continuous Infusions:   PRN Meds:.  acetaminophen    albuterol    senna-docusate sodium **AND** polyethylene glycol **AND** bisacodyl **AND** bisacodyl    dextrose    dextrose    glucagon (human recombinant)    heparin    HYDROcodone-acetaminophen    melatonin    [] HYDROmorphone **AND** naloxone    ondansetron **OR** ondansetron    Insert Peripheral IV **AND** sodium chloride    Access VAD **AND** sodium chloride    sodium chloride    sodium chloride    sodium chloride    Assessment:   Procedure(s):  TIBIAL PLATEAU OPEN REDUCTION INTERNAL FIXATION (Awaiting Surgery)      Closed fracture of left tibial plateau    Polyneuropathy    Paroxysmal atrial fibrillation    Obstructive sleep apnea    Chronic diastolic heart failure    Chronic obstructive pulmonary disease    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  7.6.  He has been low since admission.  His Eliquis has been on hold.  I will type and screen and hold 2 units of PRBC to be transfused after surgery today.  To OR on call    Hugo Kline MD    Date: 2023    Time: 09:00 EST

## 2023-12-22 NOTE — PLAN OF CARE
Goal Outcome Evaluation:  Plan of Care Reviewed With: patient        Progress: improving  Outcome Evaluation: HTN. NVI. pain managed with pain medication. sx today. lopez in place. nonwb. dressing changes done CDI. plan to d/c when medically stable.

## 2023-12-22 NOTE — PLAN OF CARE
Goal Outcome Evaluation:  Plan of Care Reviewed With: patient        Progress: no change  Outcome Evaluation: Pt remains stable. Skin protection measures followed, all wound care completed. Sx scheduled for 1200 today, made NPO at MN. Minimal c/o pain this shift, no requests for pain meds. Chest port functioning well. Remains on 1L O2, unable to ween off. Educated on diabetes management, insulin regimen followed.

## 2023-12-22 NOTE — ANESTHESIA PREPROCEDURE EVALUATION
Anesthesia Evaluation     NPO Solid Status: > 8 hours             Airway   Mallampati: II  TM distance: >3 FB  Neck ROM: full  Dental    (+) edentulous    Pulmonary - normal exam   (+) COPD,sleep apnea on CPAP  Cardiovascular - normal exam    (+) hypertension, dysrhythmias Paroxysmal Atrial Fib, CHF Diastolic >=55%, hyperlipidemia      Neuro/Psych  GI/Hepatic/Renal/Endo    (+) obesity, GERD, renal disease- CRI, diabetes mellitus    Musculoskeletal     Abdominal    Substance History      OB/GYN          Other                    Anesthesia Plan    ASA 3     general     (Femoral nerve block for postoperative pain control per surgeon request)    Anesthetic plan, risks, benefits, and alternatives have been provided, discussed and informed consent has been obtained with: patient.    CODE STATUS:    Code Status (Patient has no pulse and is not breathing): CPR (Attempt to Resuscitate)  Medical Interventions (Patient has pulse or is breathing): Full Support

## 2023-12-22 NOTE — OP NOTE
ORTHOPAEDIC OPERATIVE NOTE    Patient: Preston Wallis    YOB: 1965    Medical Record Number: [id number]    Attending Physician: Shabbir Peraza MD    Primary Care Physician: Kimmy Riley MD    Date of Service: 12/22/2023    Surgeon: Hugo Kline MD        DATE OF PROCEDURE: 12/22/2023    Pre-op Diagnosis:   Closed fracture of left tibial plateau, initial encounter [S82.142A]    Post-Op Diagnosis Codes:     * Closed fracture of left tibial plateau, initial encounter [S82.142A]    PROCEDURE PERFORMED: LEFT TIBIAL PLATEAU OPEN REDUCTION INTERNAL FIXATION  utilizing  [employer] Axsos 3  anatomically contoured proximal tibia  locking plate  (titanium).    Implant Name Type Inv. Item Serial No.  Lot No. LRB No. Used Action   DEV CONTRL TISS STRATAFIX SPIRAL MNCRYL UD 3/0 PLS 30CM - [id number] Implant DEV CONTRL TISS STRATAFIX SPIRAL MNCRYL UD 3/0 PLS 30CM  [employer]  DIV OF J AND J [id number] Left 1 Implanted   PLT TIB AXSOS LAT/PROX 10H 199MM LT - [id number] Implant PLT TIB AXSOS LAT/PROX 10H 199MM LT  [employer]UG[employer] . Left 1 Implanted   SCRW LK AXSOS 4X70MM - [id number] Implant SCRW LK AXSOS 4X70MM  [employer]UG[employer] . Left 3 Implanted   SCRW LK AXSOS 4X24MM - [id number] Implant SCRW LK AXSOS 4X24MM  [employer] . Left 1 Implanted   SCRW LK AXSOS 4X28MM - [id number] Implant SCRW LK AXSOS 4X28MM  [employer] . Left 3 Implanted   SCRW LK AXSOS 4X34MM - [id number] Implant SCRW LK AXSOS 4X34MM  [employer] . Left 1 Implanted   SCRW LK AXSOS 6DSG12CW - [id number] Implant SCRW LK AXSOS [id number]  TYREE AUGUST . Left 2 Implanted       SURGEON: Hugo Kline MD     ASSISTANT: Kristopher Gandhi MD, Fellow    ANESTHESIA: General with Block  Anesthesiologist: Dante Whittington MD  CRNA: Deshawn Restrepo CRNA    Estimated Blood Loss: 30 mL    Specimens:   Order Name Source Comment Collection Info Order Time   TYPE AND SCREEN    Collected By: Alix Garcia RN 12/22/2023 11:35 AM     Release to patient   Routine Release        PREPARE RBC Other   12/22/2023 12:18 PM     When to Transfuse?   Hold          Indication for Transfusion   Surgery          Surgery Date   12/22/2023          Release to patient   Routine Release            COMPLICATIONS:  Nil.     DRAINS:    Urethral Catheter Non-latex 16 Fr. (Active)   Daily Indications Chronic Urinary Retention related to Obstructive, Infectious/Inflammatory, Neurologic or Traumatic Causes 12/22/23 0846   Site Assessment Clean;Skin intact 12/22/23 0846   Collection Container Standard drainage bag 12/22/23 0846   Securement Method Securing device 12/22/23 0846   Catheter care complete Yes 12/21/23 0805   Irrigation/Instillation Indication patency maintenance 12/20/23 2000   Intake (mL) 240 mL 12/17/23 1754   Output (mL) 350 mL 12/22/23 0718       [REMOVED] Urethral Catheter (Removed)   Daily Indications Acute Urinary Retention 12/14/23 0850   Site Assessment Clean;Skin intact 12/13/23 2032   Collection Container Standard drainage bag 12/14/23 0958   Securement Method Securing device 12/14/23 0958   Catheter care complete Yes 12/14/23 0958   Output (mL) 950 mL 12/15/23 0330        INDICATIONS: The patient is a 58 y.o. male  who presented to [facility] following a history of injury.  He was discharged from the hospital and while he was leaving the hospital he tried to transfer from his wheelchair to the car and that he slipped and fell.  The patient sustained a comminuted proximal tibial  fracture involving the metaphysis.  There was displacement.  He has been admitted for further evaluation and management.     He has been on Eliquis and this has been on hold for the day of admission.  Subsequently it was restarted.  He did develop some superficial blisters.  Blisters eventually started resolving and he was scheduled for surgery.  Patient had his Eliquis again held for the past 2 days for  surgery.     He did drop his hemoglobin, he has chronic anemia.  His hemoglobin was 7.6 today.     Treatment options and alternatives were discussed in detail with the patient who is indicated for an open reduction and internal fixation . The likely risks and benefits of the procedure including but not limited to infection, DVT, pulmonary embolism, leg length discrepancy, malunion nonunion, possibility of injury to nerves or vessels, stiffness, arthritis, compartment syndrome,  possibility of periprosthetic fractures have been discussed in detail. Despite the risks involved, the patient and  family would like to proceed. Despite the risks involved, the patient elected to proceed and informed consent was obtained and the patient was scheduled for surgery. The patient was seen in the preoperative holding area and the operative site was marked.    DESCRIPTION OF PROCEDURE:     The patient was transferred to [facility] operating room. Preoperative antibiotics in the form of vancomycin and Kefzol  intravenously was infused prior to the incision  according to the SCIP protocol.  A surgical time out was done with the team and the correct patient, surgical side and site were identified.     He had a pressure ulceration early stage on the left heel.  I did not utilize a tourniquet.    A bone foam was utilized to support the leg.    The fracture was reduced by closed manipulation.  Under image intensifier control the fracture site was identified and reduction was checked and it was found to be satisfactory in both AP and lateral views.     The plan was for MIPO at the metaphyseal fracture.   A skin incision was made on the lateral aspect of the proximal third of the leg in a S shaped manner.  skin and subcutaneous tissue were incised and the deep fascia was incised in line with the skin incision.  The lateral aspect of the proximal tibia  was identified. The fascia overlying the tibialis anterior muscle was  released from the Gerdy's tubercle and the muscle was elevated from proximal tibia.  Subperiosteal dissection was done along the shaft of the tibia with a long periosteal elevator under image intensifier control.      I selected an anatomically contoured locking plate 10 holes ([place] titanium) in order to span the  Fracture and to be able to adequately fix the fracture. The plate was mounted to its locking guide and passed along the lateral aspect of the tibia and positioned satisfactorily both in AP and lateral views.  A periarticular clamp was utilized to reduce the intercondylar fracture line and compressed the fracture line. n the lateral side the clamp was placed on the plate.A guidewire was passed into the proxima tibia using the guide parallel to the joint line.  Followed by this a closed box construct was obtained by placing a drill bit in the shaft portion of the plate utilizing the percutaneous aiming arm with small incisions on the lateral aspect of the leg. Traction was utilized prior prior to placing the drill bit.  The overall positioning of the plate and reduction of the fracture was found to be satisfactory.      Four proximal locking screws were fixed in the proximal  portion of the tibia under image intensifier control. The overall alignment of the shaft was found to be satisfactory.  There was mild medial displacement of the tibial shaft of the distal fragment. A push pull device was utilized distal to the fracture site to correct the varus and  achieve overall satisfactory alignment.  I finished the distal fixation of the shaft with the percutaneous guide fixing  four screws distally utilizing alternating screw holes flexible fixation . Followed by this the aiming arm was removed.   Care was taken to avoid placement of any intra-articular  screws .  The overall reduction of the fracture, fixation of the fracture and position of the hardware was found to be satisfactory and stable.  The knee was  ranged and range of motion was found to be satisfactory from 0-90°. The knee was stable for varus valgus stress.       The bone quality was good and there was no necessity for augmentation with bone void fillers.     The sponge and needle count was found to be correct. The incisions were thoroughly irrigated with saline and the incisions were closed in layers sterile dressings were placed and the patient was transferred to the recovery room in a stable condition. The patient had adequate distal pulses and good capillary refill.  The compartments were soft. A knee immobilizer was given.      I plan to start Eliquis 2.5 mg 2 times daily starting on postoperative day #1 followed by resuming his home dose on postop day #2 for DVT prophylaxis.  Also patient will receive 2 more doses of  Kefzol   for antibiotic prophylaxis .   We will assess mobility with physical therapy and make plans accordingly for discharge.  I will try to keep him nonweightbearing.  If he is unable to maintain nonweightbearing I will change it to weightbearing as tolerated for transfers only.    The heel ulcer has been protected with waffle boot.    He has significant limited mobility.  He has a flexion contracture of about 10 to 15 degrees around the left knee.  He has severe osteoarthritis of the left knee.  He has a previous left femur intramedullary nail.    I discussed the satisfactory performance of the procedure with the patient's family and discussed with them The postoperative management.      Hugo Kline M.D.    12/22/2023    CC: Kimmy Riley MD

## 2023-12-22 NOTE — ANESTHESIA PROCEDURE NOTES
Peripheral Block      Patient reassessed immediately prior to procedure    Start time: 12/22/2023 12:00 PM  Stop time: 12/22/2023 12:04 PM  Reason for block: at surgeon's request and post-op pain management  Performed by  Anesthesiologist: Venkatesh Wyman MD  Preanesthetic Checklist  Completed: patient identified, risks and benefits discussed, surgical consent, pre-op evaluation and timeout performed  Prep:  Pt Position: supine  Sterile barriers:cap, gloves, mask and washed/disinfected hands  Prep: ChloraPrep  Patient monitoring: blood pressure monitoring, continuous pulse oximetry and EKG  Procedure    Sedation: yes    Guidance:ultrasound guided    ULTRASOUND INTERPRETATION.  Using ultrasound guidance a 21 G gauge needle was placed in close proximity to the femoral nerve, at which point, under ultrasound guidance anesthetic was injected in the area of the nerve and spread of the anesthesia was seen on ultrasound in close proximity thereto.  There were no abnormalities seen on ultrasound; a digital image was taken; and the patient tolerated the procedure with no complications. Images:still images obtained, printed/placed on chart    Laterality:left  Block Type:femoral  Injection Technique:single-shot  Needle Type:echogenic  Needle Gauge:21 G      Medications Used: ropivacaine (NAROPIN) 0.5 % injection - Injection   30 mL - 12/22/2023 12:04:00 PM      Post Assessment  Injection Assessment: negative aspiration for heme, no paresthesia on injection and incremental injection  Patient Tolerance:comfortable throughout block  Complications:no  Additional Notes  ULTRASOUND INTERPRETATION. Using ultrasound guidance theneedle was placed in close proximity to the nerve, at which point, under ultrasound guidance, local anesthetic was injected around but not in the nerve and spread of the anesthesia was seen on ultrasound in close proximity thereto.  There were no abnormalities seen on ultrasound; a digital image was taken;  and the patient tolerated the procedure with no complications.

## 2023-12-22 NOTE — NURSING NOTE
RN was contacted by clinical outcome about this pt lopez and CAUTI risk. It was recommended that he straight cath in the hospital instead of keeping the lopez in place. When discussed with Stu they agree that would be fine. After, RN discussed with Pt and they stated they do not straight cath themself, but his wife does it for him and he is unable to do it on his own. After discussing this with Stu it was agreed upon that the lopez should stay in place for now.

## 2023-12-22 NOTE — ANESTHESIA PROCEDURE NOTES
Airway  Urgency: elective    Date/Time: 12/22/2023 12:22 PM  Airway not difficult    General Information and Staff    Patient location during procedure: OR  Anesthesiologist: Dante Whittington MD  CRNA/CAA: Deshawn Restrepo CRNA    Indications and Patient Condition  Indications for airway management: airway protection    Preoxygenated: yes  MILS maintained throughout  Mask difficulty assessment: 2 - vent by mask + OA or adjuvant +/- NMBA    Final Airway Details  Final airway type: endotracheal airway      Successful airway: ETT  Cuffed: yes   Successful intubation technique: direct laryngoscopy  Endotracheal tube insertion site: oral  Blade: Dayna  Blade size: 4  ETT size (mm): 7.5  Cormack-Lehane Classification: grade I - full view of glottis  Placement verified by: chest auscultation and capnometry   Measured from: gums  ETT/EBT to gums (cm): 23  Number of attempts at approach: 1  Assessment: lips, teeth, and gum same as pre-op and atraumatic intubation

## 2023-12-22 NOTE — ANESTHESIA POSTPROCEDURE EVALUATION
"Patient: Preston Wallis    Procedure Summary       Date: 12/22/23 Room / Location: [facility] OR  / [facility] MAIN OR    Anesthesia Start: 1215 Anesthesia Stop: 1420    Procedure: TIBIAL PLATEAU OPEN REDUCTION INTERNAL FIXATION (Left: Leg Lower) Diagnosis:       Closed fracture of left tibial plateau, initial encounter      (Closed fracture of left tibial plateau, initial encounter [S82.142A])    Surgeons: Hugo Kline MD Provider: Dante Whittington MD    Anesthesia Type: general ASA Status: 3            Anesthesia Type: general    Vitals  Vitals Value Taken Time   /87 12/22/23 1500   Temp 37 °C (98.6 °F) 12/22/23 1420   Pulse 80 12/22/23 1510   Resp 16 12/22/23 1445   SpO2 96 % 12/22/23 1510   Vitals shown include unfiled device data.        Post Anesthesia Care and Evaluation    Patient location during evaluation: bedside  Patient participation: complete - patient participated  Level of consciousness: awake and alert  Pain management: adequate    Airway patency: patent  Anesthetic complications: No anesthetic complications  PONV Status: controlled  Cardiovascular status: acceptable and hemodynamically stable  Respiratory status: acceptable, spontaneous ventilation and nonlabored ventilation  Hydration status: acceptable    Comments: /87   Pulse 81   Temp 37 °C (98.6 °F) (Oral)   Resp 16   Ht 175.3 cm (69\")   Wt 127 kg (280 lb)   SpO2 95%   BMI 41.35 kg/m²       "

## 2023-12-23 LAB
ALBUMIN SERPL-MCNC: 3.1 G/DL (ref 3.5–5.2)
ALBUMIN/GLOB SERPL: 0.8 G/DL
ALP SERPL-CCNC: 168 U/L (ref 39–117)
ALT SERPL W P-5'-P-CCNC: 12 U/L (ref 1–41)
ANION GAP SERPL CALCULATED.3IONS-SCNC: 9.1 MMOL/L (ref 5–15)
AST SERPL-CCNC: 18 U/L (ref 1–40)
BASOPHILS # BLD AUTO: 0.03 10*3/MM3 (ref 0–0.2)
BASOPHILS NFR BLD AUTO: 0.3 % (ref 0–1.5)
BH BB BLOOD EXPIRATION DATE: NORMAL
BH BB BLOOD EXPIRATION DATE: NORMAL
BH BB BLOOD TYPE BARCODE: 9500
BH BB BLOOD TYPE BARCODE: 9500
BH BB DISPENSE STATUS: NORMAL
BH BB DISPENSE STATUS: NORMAL
BH BB PRODUCT CODE: NORMAL
BH BB PRODUCT CODE: NORMAL
BH BB UNIT NUMBER: NORMAL
BH BB UNIT NUMBER: NORMAL
BILIRUB SERPL-MCNC: 0.2 MG/DL (ref 0–1.2)
BUN SERPL-MCNC: 26 MG/DL (ref 6–20)
BUN/CREAT SERPL: 18.2 (ref 7–25)
CALCIUM SPEC-SCNC: 9.1 MG/DL (ref 8.6–10.5)
CHLORIDE SERPL-SCNC: 98 MMOL/L (ref 98–107)
CO2 SERPL-SCNC: 29.9 MMOL/L (ref 22–29)
CREAT SERPL-MCNC: 1.43 MG/DL (ref 0.76–1.27)
CROSSMATCH INTERPRETATION: NORMAL
CROSSMATCH INTERPRETATION: NORMAL
DEPRECATED RDW RBC AUTO: 44 FL (ref 37–54)
EGFRCR SERPLBLD CKD-EPI 2021: 56.8 ML/MIN/1.73
EOSINOPHIL # BLD AUTO: 0 10*3/MM3 (ref 0–0.4)
EOSINOPHIL NFR BLD AUTO: 0 % (ref 0.3–6.2)
ERYTHROCYTE [DISTWIDTH] IN BLOOD BY AUTOMATED COUNT: 14.3 % (ref 12.3–15.4)
GLOBULIN UR ELPH-MCNC: 3.9 GM/DL
GLUCOSE BLDC GLUCOMTR-MCNC: 130 MG/DL (ref 70–130)
GLUCOSE BLDC GLUCOMTR-MCNC: 158 MG/DL (ref 70–130)
GLUCOSE BLDC GLUCOMTR-MCNC: 160 MG/DL (ref 70–130)
GLUCOSE BLDC GLUCOMTR-MCNC: 180 MG/DL (ref 70–130)
GLUCOSE SERPL-MCNC: 191 MG/DL (ref 65–99)
HCT VFR BLD AUTO: 32 % (ref 37.5–51)
HGB BLD-MCNC: 10 G/DL (ref 13–17.7)
IMM GRANULOCYTES # BLD AUTO: 0.03 10*3/MM3 (ref 0–0.05)
IMM GRANULOCYTES NFR BLD AUTO: 0.3 % (ref 0–0.5)
LYMPHOCYTES # BLD AUTO: 1.24 10*3/MM3 (ref 0.7–3.1)
LYMPHOCYTES NFR BLD AUTO: 13.4 % (ref 19.6–45.3)
MCH RBC QN AUTO: 27 PG (ref 26.6–33)
MCHC RBC AUTO-ENTMCNC: 31.3 G/DL (ref 31.5–35.7)
MCV RBC AUTO: 86.3 FL (ref 79–97)
MONOCYTES # BLD AUTO: 0.89 10*3/MM3 (ref 0.1–0.9)
MONOCYTES NFR BLD AUTO: 9.6 % (ref 5–12)
NEUTROPHILS NFR BLD AUTO: 7.06 10*3/MM3 (ref 1.7–7)
NEUTROPHILS NFR BLD AUTO: 76.4 % (ref 42.7–76)
NRBC BLD AUTO-RTO: 0 /100 WBC (ref 0–0.2)
PLATELET # BLD AUTO: 448 10*3/MM3 (ref 140–450)
PMV BLD AUTO: 8.2 FL (ref 6–12)
POTASSIUM SERPL-SCNC: 4.7 MMOL/L (ref 3.5–5.2)
PROT SERPL-MCNC: 7 G/DL (ref 6–8.5)
RBC # BLD AUTO: 3.71 10*6/MM3 (ref 4.14–5.8)
SODIUM SERPL-SCNC: 137 MMOL/L (ref 136–145)
UNIT  ABO: NORMAL
UNIT  ABO: NORMAL
UNIT  RH: NORMAL
UNIT  RH: NORMAL
WBC NRBC COR # BLD AUTO: 9.25 10*3/MM3 (ref 3.4–10.8)

## 2023-12-23 PROCEDURE — 94664 DEMO&/EVAL PT USE INHALER: CPT

## 2023-12-23 PROCEDURE — 82948 REAGENT STRIP/BLOOD GLUCOSE: CPT

## 2023-12-23 PROCEDURE — 80053 COMPREHEN METABOLIC PANEL: CPT | Performed by: INTERNAL MEDICINE

## 2023-12-23 PROCEDURE — 63710000001 INSULIN GLARGINE PER 5 UNITS: Performed by: ORTHOPAEDIC SURGERY

## 2023-12-23 PROCEDURE — 63710000001 INSULIN LISPRO (HUMAN) PER 5 UNITS: Performed by: ORTHOPAEDIC SURGERY

## 2023-12-23 PROCEDURE — 85025 COMPLETE CBC W/AUTO DIFF WBC: CPT | Performed by: INTERNAL MEDICINE

## 2023-12-23 PROCEDURE — 97530 THERAPEUTIC ACTIVITIES: CPT | Performed by: PHYSICAL THERAPIST

## 2023-12-23 PROCEDURE — 97163 PT EVAL HIGH COMPLEX 45 MIN: CPT | Performed by: PHYSICAL THERAPIST

## 2023-12-23 PROCEDURE — 25010000002 VANCOMYCIN 10 G RECONSTITUTED SOLUTION: Performed by: ORTHOPAEDIC SURGERY

## 2023-12-23 PROCEDURE — 25010000002 CEFAZOLIN IN DEXTROSE 2-4 GM/100ML-% SOLUTION: Performed by: ORTHOPAEDIC SURGERY

## 2023-12-23 PROCEDURE — 94799 UNLISTED PULMONARY SVC/PX: CPT

## 2023-12-23 PROCEDURE — 25810000003 SODIUM CHLORIDE 0.9 % SOLUTION: Performed by: ORTHOPAEDIC SURGERY

## 2023-12-23 RX ADMIN — INSULIN LISPRO 8 UNITS: 100 INJECTION, SOLUTION INTRAVENOUS; SUBCUTANEOUS at 13:07

## 2023-12-23 RX ADMIN — FOLIC ACID 1 MG: 1 TABLET ORAL at 09:50

## 2023-12-23 RX ADMIN — Medication 10 ML: at 09:45

## 2023-12-23 RX ADMIN — SENNOSIDES AND DOCUSATE SODIUM 2 TABLET: 50; 8.6 TABLET ORAL at 09:50

## 2023-12-23 RX ADMIN — CASTOR OIL AND BALSAM, PERU 1 APPLICATION: 788; 87 OINTMENT TOPICAL at 21:15

## 2023-12-23 RX ADMIN — Medication 2000 UNITS: at 09:51

## 2023-12-23 RX ADMIN — METOPROLOL TARTRATE 100 MG: 50 TABLET, FILM COATED ORAL at 21:15

## 2023-12-23 RX ADMIN — ZINC OXIDE 1 APPLICATION: 200 OINTMENT TOPICAL at 10:44

## 2023-12-23 RX ADMIN — Medication 500 MG: at 09:50

## 2023-12-23 RX ADMIN — MUPIROCIN 1 APPLICATION: 20 OINTMENT TOPICAL at 10:46

## 2023-12-23 RX ADMIN — INSULIN LISPRO 8 UNITS: 100 INJECTION, SOLUTION INTRAVENOUS; SUBCUTANEOUS at 09:52

## 2023-12-23 RX ADMIN — MUPIROCIN 1 APPLICATION: 20 OINTMENT TOPICAL at 21:18

## 2023-12-23 RX ADMIN — INSULIN LISPRO 2 UNITS: 100 INJECTION, SOLUTION INTRAVENOUS; SUBCUTANEOUS at 09:51

## 2023-12-23 RX ADMIN — DULOXETINE HYDROCHLORIDE 60 MG: 60 CAPSULE, DELAYED RELEASE ORAL at 09:50

## 2023-12-23 RX ADMIN — METOPROLOL TARTRATE 100 MG: 50 TABLET, FILM COATED ORAL at 09:50

## 2023-12-23 RX ADMIN — INSULIN LISPRO 2 UNITS: 100 INJECTION, SOLUTION INTRAVENOUS; SUBCUTANEOUS at 16:27

## 2023-12-23 RX ADMIN — ATORVASTATIN CALCIUM 20 MG: 20 TABLET, FILM COATED ORAL at 21:15

## 2023-12-23 RX ADMIN — INSULIN GLARGINE 50 UNITS: 100 INJECTION, SOLUTION SUBCUTANEOUS at 09:51

## 2023-12-23 RX ADMIN — DULOXETINE HYDROCHLORIDE 60 MG: 60 CAPSULE, DELAYED RELEASE ORAL at 21:15

## 2023-12-23 RX ADMIN — CEFAZOLIN SODIUM 2000 MG: 2 INJECTION, SOLUTION INTRAVENOUS at 04:42

## 2023-12-23 RX ADMIN — BUDESONIDE AND FORMOTEROL FUMARATE DIHYDRATE 2 PUFF: 160; 4.5 AEROSOL RESPIRATORY (INHALATION) at 21:51

## 2023-12-23 RX ADMIN — MAGNESIUM OXIDE 400 MG (241.3 MG MAGNESIUM) TABLET 400 MG: TABLET at 09:50

## 2023-12-23 RX ADMIN — MONTELUKAST SODIUM 10 MG: 10 TABLET, FILM COATED ORAL at 21:15

## 2023-12-23 RX ADMIN — NIFEDIPINE 30 MG: 30 TABLET, FILM COATED, EXTENDED RELEASE ORAL at 06:25

## 2023-12-23 RX ADMIN — APIXABAN 2.5 MG: 2.5 TABLET, FILM COATED ORAL at 09:50

## 2023-12-23 RX ADMIN — FERROUS SULFATE TAB 325 MG (65 MG ELEMENTAL FE) 325 MG: 325 (65 FE) TAB at 09:51

## 2023-12-23 RX ADMIN — GABAPENTIN 300 MG: 300 CAPSULE ORAL at 09:50

## 2023-12-23 RX ADMIN — Medication 10 ML: at 21:16

## 2023-12-23 RX ADMIN — BUDESONIDE AND FORMOTEROL FUMARATE DIHYDRATE 2 PUFF: 160; 4.5 AEROSOL RESPIRATORY (INHALATION) at 08:17

## 2023-12-23 RX ADMIN — HYDROCODONE BITARTRATE AND ACETAMINOPHEN 1 TABLET: 5; 325 TABLET ORAL at 13:06

## 2023-12-23 RX ADMIN — FERROUS SULFATE TAB 325 MG (65 MG ELEMENTAL FE) 325 MG: 325 (65 FE) TAB at 16:26

## 2023-12-23 RX ADMIN — FAMOTIDINE 40 MG: 20 TABLET ORAL at 06:20

## 2023-12-23 RX ADMIN — FINASTERIDE 5 MG: 5 TABLET, FILM COATED ORAL at 09:50

## 2023-12-23 RX ADMIN — APIXABAN 2.5 MG: 2.5 TABLET, FILM COATED ORAL at 21:15

## 2023-12-23 RX ADMIN — GABAPENTIN 300 MG: 300 CAPSULE ORAL at 21:15

## 2023-12-23 RX ADMIN — VANCOMYCIN 2 GRAM/500 ML IN 0.9 % SODIUM CHLORIDE INTRAVENOUS 2000 MG: at 10:28

## 2023-12-23 RX ADMIN — INSULIN LISPRO 8 UNITS: 100 INJECTION, SOLUTION INTRAVENOUS; SUBCUTANEOUS at 16:28

## 2023-12-23 RX ADMIN — ZINC OXIDE 1 APPLICATION: 200 OINTMENT TOPICAL at 21:18

## 2023-12-23 NOTE — PLAN OF CARE
Goal Outcome Evaluation:  Plan of Care Reviewed With: patient      Mr. Wallis is a 58-year-old male.  He is 1 day status post left tibial plateau fracture/ORIF procedure.  Previous history of being admitted to the hospital 12/14/2023 for pneumonia, fell in the parking lot upon discharge and immediate return to the hospital.  He reports at baseline he is of bed to wheelchair transfer that he can perform independently to mod assist.  Reports also baseline requiring some assistance for dressing and bathing and bed mobility.  He is nonweightbearing left lower extremity.  He has a straight leg brace which is down for attempts at mobility today he requires moderate assistance of 2 for bed mobility.  He is able to sit edge of bed greater than 5 minutes.  Attempts at stand pivot transfers are unsuccessful with patient unable to clear glutes from the bed.  We ultimately utilize Elvia lift for bed to chair transfer.  Anticipated discharge location SNF. Mr. Wlalis is a candidate for skilled physical therapy to facilitate and ease of performing basic functional mobility activities.            Anticipated Discharge Disposition (PT): skilled nursing facility

## 2023-12-23 NOTE — PROGRESS NOTES
Name: Preston Wallis ADMIT: 2023   : 1965  PCP: Kimmy Riley MD    MRN: [id number] LOS: 1 days   AGE/SEX: 58 y.o. male  ROOM: [facility]     Subjective   Subjective   Patient seen at bedside.       Objective   Objective   Vital Signs  Temp:  [96.9 °F (36.1 °C)-98.6 °F (37 °C)] 97.9 °F (36.6 °C)  Heart Rate:  [74-88] 77  Resp:  [16-20] 16  BP: (139-201)/(74-91) 164/78  SpO2:  [92 %-100 %] 98 %  on  Flow (L/min):  [1-10] 2;   Device (Oxygen Therapy): nasal cannula  Body mass index is 41.35 kg/m².  Physical Exam  General, awake and alert.  Head and ENT, normocephalic and atraumatic.  Lungs, symmetric expansion, equal air entry bilaterally.  Heart, regular rate and rhythm.  Abdomen, soft and nontender.  Extremities, no clubbing or cyanosis.  Neuro, no focal deficits.  Skin: Warm and no rash.  Psych, normal mood and affect.  Musculoskeletal, joint examination is grossly normal.             Copied text material from yesterday's note has been reviewed for appropriate changes and remains accurate as of 23.      Results Review     I reviewed the patient's new clinical results.  Results from last 7 days   Lab Units 23  04323  0538 23  0516 23  0451 23  0402   WBC 10*3/mm3 10.28 10.22 10.52  --  10.86*   HEMOGLOBIN g/dL 7.6* 8.1* 7.9* 8.3* 7.3*   PLATELETS 10*3/mm3 477* 430 375  --  308     Results from last 7 days   Lab Units 23  0431 23  0538 23  0516 23  0402   SODIUM mmol/L 136 136 138 137   POTASSIUM mmol/L 4.5 4.8 5.1 4.2   CHLORIDE mmol/L 99 99 102 104   CO2 mmol/L 30.0* 28.5 25.0 25.0   BUN mg/dL 26* 31* 26* 28*   CREATININE mg/dL 1.52* 1.74* 1.48* 1.82*   GLUCOSE mg/dL 110* 170* 132* 261*   EGFR mL/min/1.73 52.8* 44.9* 54.5* 42.5*     Results from last 7 days   Lab Units 23  0431 23  0538 23  0516   ALBUMIN g/dL 2.7* 3.0* 2.7*   BILIRUBIN mg/dL 0.3 0.2 0.2   ALK PHOS U/L 149* 145* 135*   AST (SGOT) U/L 15 16 17   ALT  (SGPT) U/L 16 11 17     Results from last 7 days   Lab Units 12/22/23  0431 12/21/23  0538 12/20/23  0516 12/16/23  0402   CALCIUM mg/dL 9.0 9.1 9.4 8.5*   ALBUMIN g/dL 2.7* 3.0* 2.7*  --        Glucose   Date/Time Value Ref Range Status   12/22/2023 2016 224 (H) 70 - 130 mg/dL Final   12/22/2023 1609 97 70 - 130 mg/dL Final   12/22/2023 1420 77 70 - 130 mg/dL Final   12/22/2023 1106 114 70 - 130 mg/dL Final   12/22/2023 0646 183 (H) 70 - 130 mg/dL Final   12/21/2023 2117 168 (H) 70 - 130 mg/dL Final   12/21/2023 1548 115 70 - 130 mg/dL Final       No radiology results for the last day    I have personally reviewed all medications:  Scheduled Medications  [START ON 12/23/2023] apixaban, 2.5 mg, Oral, Q12H  [Held by provider] apixaban, 5 mg, Oral, Q12H  [START ON 12/24/2023] apixaban, 5 mg, Oral, Q12H  atorvastatin, 20 mg, Oral, Nightly  budesonide-formoterol, 2 puff, Inhalation, BID - RT  calcium carbonate (oyster shell), 500 mg, Oral, Daily  castor oil-balsam peru, 1 application , Topical, Q12H  ceFAZolin, 2,000 mg, Intravenous, Q8H  cholecalciferol, 2,000 Units, Oral, Daily  DULoxetine, 60 mg, Oral, BID  famotidine, 40 mg, Oral, QAM  ferrous sulfate, 325 mg, Oral, BID With Meals  finasteride, 5 mg, Oral, Daily  folic acid, 1 mg, Oral, Daily  gabapentin, 300 mg, Oral, Q12H  hydrocortisone-bacitracin-zinc oxide-nystatin, 1 application , Topical, Q12H  insulin glargine, 50 Units, Subcutaneous, Daily  insulin lispro, 2-7 Units, Subcutaneous, 4x Daily AC & at Bedtime  insulin lispro, 8 Units, Subcutaneous, TID AC  magnesium oxide, 400 mg, Oral, Daily  metoprolol tartrate, 100 mg, Oral, BID  montelukast, 10 mg, Oral, Nightly  mupirocin, 1 application , Topical, Q12H  NIFEdipine XL, 30 mg, Oral, QAM  O2, 2 L/min, Inhalation, Once  senna-docusate sodium, 2 tablet, Oral, BID  sodium chloride, 10 mL, Intravenous, Q12H  [START ON 12/23/2023] vancomycin, 15 mg/kg, Intravenous, Once    Infusions   Diet  Diet: Regular/House  Diet; Texture: Regular Texture (IDDSI 7); Fluid Consistency: Thin (IDDSI 0)    I have personally reviewed:  [x]  Laboratory   [x]  Microbiology   [x]  Radiology   [x]  EKG/Telemetry  [x]  Cardiology/Vascular   []  Pathology    []  Records       Assessment/Plan     Active Hospital Problems    Diagnosis  POA    **Closed fracture of left tibial plateau [S82.142A]  Yes    Essential hypertension [I10]  Yes    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       58 y.o. male admitted with Closed fracture of left tibial plateau.    Assessment and plan  1.  Closed fracture of left tibia plateau, management per Ortho.  Continue pain control and physical therapy.     2.  Hypertension, diabetes, COPD, chronic conditions.     3. Paroxysmal atrial fibrillation, patient is currently rate controlled.     4.  Morbid obesity, complicates aspects of care.     5.  CODE STATUS is full code.  Further plans based on hospital course.      Shabbir Peraza MD  [facility]  12/22/23  20:20 EST

## 2023-12-23 NOTE — PLAN OF CARE
Goal Outcome Evaluation:  Plan of Care Reviewed With: patient        Progress: no change  Outcome Evaluation: Pt remains stable. NWB to LLE, will have PT eval tomorrow. Dressing to LLE is cdi. Wound care completed as ordered. Chest port utilized. Eliquis to be resumed today. Received blood in PACU, will monitoring hgb. FC remains in place. Educated on diabetes management, insulin regimen followed. CCP to f/u for dc planning.

## 2023-12-23 NOTE — PROGRESS NOTES
Orthopedic Progress Note      Patient: Preston Wallis  Date of Admission: 12/14/2023  YOB: 1965  Medical Record Number: [id number]    POD # :  1 Day Post-Op Procedure(s) (LRB):  TIBIAL PLATEAU OPEN REDUCTION INTERNAL FIXATION (Left)    Systemic or Specific Complaints: No Complaints    Pain Relief: some relief    Physical Exam:  58 y.o.  male  Vitals:  Temp:  [96.9 °F (36.1 °C)-98.6 °F (37 °C)] 97.6 °F (36.4 °C)  Heart Rate:  [75-90] 81  Resp:  [16-20] 16  BP: (155-201)/(78-93) 168/85  Mental Status: Awake and alert  Chest: Clear to auscultation  CV: Regular Rate and Rhythm  Abd: Soft, NT, with BS +  Ext: NV intact. ROM appropriate. Calf is soft and nontender. Negative Homans sign  Skin: Incision clean dry and intact w/out signs or  symptoms of infection.    Activity: Bed to chair transfers only  Weight Bearing: NWB LLE in knee immobilizer     Data Review     No results displayed because visit has over 200 results.          No results found.    Medications:  apixaban, 2.5 mg, Oral, Q12H  [Held by provider] apixaban, 5 mg, Oral, Q12H  [START ON 12/24/2023] apixaban, 5 mg, Oral, Q12H  atorvastatin, 20 mg, Oral, Nightly  budesonide-formoterol, 2 puff, Inhalation, BID - RT  calcium carbonate (oyster shell), 500 mg, Oral, Daily  castor oil-balsam peru, 1 application , Topical, Q12H  cholecalciferol, 2,000 Units, Oral, Daily  DULoxetine, 60 mg, Oral, BID  famotidine, 40 mg, Oral, QAM  ferrous sulfate, 325 mg, Oral, BID With Meals  finasteride, 5 mg, Oral, Daily  folic acid, 1 mg, Oral, Daily  gabapentin, 300 mg, Oral, Q12H  hydrocortisone-bacitracin-zinc oxide-nystatin, 1 application , Topical, Q12H  insulin glargine, 50 Units, Subcutaneous, Daily  insulin lispro, 2-7 Units, Subcutaneous, 4x Daily AC & at Bedtime  insulin lispro, 8 Units, Subcutaneous, TID AC  magnesium oxide, 400 mg, Oral, Daily  metoprolol tartrate, 100 mg, Oral, BID  montelukast, 10 mg, Oral, Nightly  mupirocin, 1 application , Topical,  Q12H  NIFEdipine XL, 30 mg, Oral, QAM  O2, 2 L/min, Inhalation, Once  senna-docusate sodium, 2 tablet, Oral, BID  sodium chloride, 10 mL, Intravenous, Q12H  vancomycin, 15 mg/kg, Intravenous, Once        acetaminophen    albuterol    senna-docusate sodium **AND** polyethylene glycol **AND** bisacodyl **AND** bisacodyl    dextrose    dextrose    glucagon (human recombinant)    heparin    HYDROcodone-acetaminophen    melatonin    [] HYDROmorphone **AND** naloxone    ondansetron **OR** ondansetron    Insert Peripheral IV **AND** sodium chloride    Access VAD **AND** sodium chloride    sodium chloride    sodium chloride    sodium chloride    Assessment:  Doing well POD  # 1 Day Post-Op Procedure(s) (LRB):  TIBIAL PLATEAU OPEN REDUCTION INTERNAL FIXATION (Left)  Problems Addressed this Visit          Musculoskeletal and Injuries    * (Principal) Closed fracture of left tibial plateau    Relevant Orders    Case Request (Completed)       Pulmonary and Pneumonias    Chronic obstructive pulmonary disease    Relevant Medications    budesonide-formoterol (SYMBICORT) 160-4.5 MCG/ACT inhaler 2 puff    montelukast (SINGULAIR) tablet 10 mg    albuterol (ACCUNEB) nebulizer solution 0.63 mg     Other Visit Diagnoses       Closed fracture of medial portion of left tibial plateau, initial encounter    -  Primary    Relevant Orders    [facility].  Knee Immobilizer (Completed)    Chronic kidney disease, unspecified CKD stage        Type 2 diabetes mellitus with other specified complication, unspecified whether long term insulin use        Relevant Medications    dextrose (GLUTOSE) oral gel 15 g    glucagon (GLUCAGEN) injection 1 mg    insulin lispro (HUMALOG/ADMELOG) injection 2-7 Units    insulin glargine (LANTUS, SEMGLEE) injection 50 Units    insulin lispro (HUMALOG/ADMELOG) injection 8 Units    Immobility              Diagnoses         Codes Comments    Closed fracture of medial portion of left tibial plateau,  initial encounter    -  Primary ICD-10-CM: S82.132A  ICD-9-CM: 823.00     Chronic kidney disease, unspecified CKD stage     ICD-10-CM: N18.9  ICD-9-CM: 585.9     Chronic obstructive pulmonary disease, unspecified COPD type     ICD-10-CM: J44.9  ICD-9-CM: 496     Type 2 diabetes mellitus with other specified complication, unspecified whether long term insulin use     ICD-10-CM: E11.69  ICD-9-CM: 250.80     Immobility     ICD-10-CM: Z74.09  ICD-9-CM: 799.89     Closed fracture of left tibial plateau, initial encounter     ICD-10-CM: S82.142A  ICD-9-CM: 823.00             Plan:  NWB LLE with knee immobilizer intact  Continue Pain Control Measures - Orals  Continue incisional Care - change dressing if soiled  DVT prophylaxis - resume home dose of Eliquis  F/U with Dr. Kline in two weeks for re-evalutation      Discharge Plan: Ok to d/c from orthopedic standpoint    CON Ortiz    Date: 12/23/2023  Time: 09:39 EST

## 2023-12-23 NOTE — DISCHARGE INSTRUCTIONS
Discharge and Follow up Instructions:     Fracture Discharge Instructions:    I. ACTIVITIES:  1. Exercises:  Complete exercise program as taught by the hospital physical therapist 2 times per day  Exercise program will be advanced by the home health/ subacute rehab physical therapist  During the day be up ambulating every 2 hours (while awake) for short distances,    Follow weightbearing instruction as given  Complete the ankle pump exercises at least 10 times per hour (while awake)  Elevate legs when in bed and for at least 30 minutes during the day.Use cold packs 20-30 minutes approximately 5 times per day. This should be done before and after completing your exercises and at any time you are experiencing pain/ stiffness in your operative extremity.      2. Activities of Daily Living:  No tub baths, hot tubs, or swimming pools for 4 weeks  May shower and let water run over the incision on post-operative day #5 if no drainage. Do not scrub or rub the incision. Simply let the water run over the incision and pat dry.    II. Restrictions    Your surgeon will discuss with you when you will be able to drive again. Usual guidelines are you are to be off pain medications prior to driving.  Weight bearing is NWB  First week stay inside on even terrain  After one week, you may venture outside (if cleared to do so by physical therapist).    III. Precautions:  Everyone that comes near you should wash their hands  \Avoid sick people. If you must be around someone who is ill, they should wear a mask.  Avoid visits to the Emergency Room or Urgent Care. If you feel you need to go to the Emergency Room, please notify your Surgeon's office.      IV. INCISION CARE:  Wash your hands prior to dressing changes  Change the dressing as needed to keep incision clean and dry. Utilize dry gauze and paper tape. Avoid touching the side of the gauze that goes against the incision with your hands.  No creams or ointments to the incision  May  remove dressing once the incision is free of drainage  Do not touch or pick at the incision  Check incision every day and notify surgeon immediately if any of the following signs or symptoms are noted:  Increase in redness  Increase in swelling around the incision and of the entire extremity  Increase in pain  Drainage oozing from the incision  Pulling apart of the edges of the incision  Increase in overall body temperature (greater than 100.5 degrees)    Your Staples will be removed between  10-14 days postoperation.  This may be done by either the home health nurse, rehabilitation nurse or during your return visit to Dr. Kline's office.  You will then be instructed on how to care for the incision.  (Please call the office if your staples have not been removed within 14 days after surgery).    V. Medications:   1. Anticoagulants: You will be discharged on an anticoagulant. This is a prophylactic medication that helps prevent blood clots during your post-operative period.  You will be on home dose of anticoagulation.   While taking the anticoagulant, you should avoid taking any additional aspirin, ibuprofen (Advil or Motrin), Aleve (Naprosyn) or other non-steroidal anti-inflammatory medications.   Notify surgeon immediately if any jovana bleeding is noted in the urine, stool, emesis, or from the nose or the incision. Blood in the stool will often appear as black rather than red. Blood in urine may appear as pink. Blood in emesis may appear as brown/black like coffee grounds.  You will need to apply pressure for longer periods of time to any cuts or abrasions to stop bleeding  Avoid alcohol while taking anticoagulants    2. Stool Softeners: You will be at greater risk of constipation after surgery due to being less mobile and the pain medications.   Take stool softeners as instructed by your surgeon while on pain medications. Over the counter Colace 100 mg 1-2 capsules twice daily.   If stools become too loose or  too frequent, please decreases the dosage or stop the stool softener.  If constipation occurs despite use of stool softeners, you are to continue the stool softeners and add a laxative (Milk of Magnesia 1 ounce daily as needed).  Dulcolax oral tabs or suppository, or a fleets enema can also be utilized for constipation and can be obtained over the counter.   If above interventions are unsuccessful in inducing bowel movements, please contact your surgeon's office / family physician's office.  Drink plenty of fluids, and eat fruits and vegetables during your recovery time    3. Pain Medications utilized after surgery are narcotics and the law requires that the following information be given to all patients that are prescribed narcotics:  CLASSIFICATION: Pain medications are called Opioids and are narcotics  LEGALITIES: It is illegal to share narcotics with others and to drive within 24 hours of taking narcotics  POTENTIAL SIDE EFFECTS: Potential side effects of opioids include: nausea, vomiting, itching, dizziness, drowsiness, dry mouth, constipation, and difficulty urinating.  POTENTIAL ADVERSE EFFECTS:   Opioid tolerance can develop with use of pain medications and this simply means that it requires more and more of the medication to control pain; however, this is seen more in patients that use opioids for longer periods of time.  Opioid dependence can develop with use of Opioids and this simply means that to stop the medication can cause withdrawal symptoms; however, this is seen with patients that use Opioids for longer periods of time.  Opioid addiction can develop with use of Opioids and the incidence of this is very unlikely in patients who take the medications as ordered and stop the medications as instructed.  Opioid overdose can be dangerous, but is unlikely when the medication is taken as ordered and stopped when ordered. It is important not to mix opioids with alcohol or with and type of sedative such as  Benadryl as this can lead to over sedation and respiratory difficulty.  DOSAGE:   Pain medications will need to be taken consistently for the first week to decrease pain and promote adequate pain relief and participation in physical therapy.  After the initial surgical pain begins to resolve, you may begin to decrease the pain medication. By the end of 6 weeks, you should be off of pain medications.  Refills will not be given by the office during evening hours, on weekends, or after 6 weeks post-op.  To seek refills on pain medications during the initial 6 week post-operative period, you must call the office 48 hours in advance to request the refill. The office will then notify you when to  the prescription. DO NOT wait until you are out of the medication to request a refill.    V. FOLLOW-UP VISITS:  You will need to follow up in the office with your surgeon in on Jan 23,2024. Please call this number [phone]  to schedule this appointment.  If you have any concerns or suspected complications prior to your follow up visit, please call your surgeons office. Do not wait until your appointment time if you suspect complications. These will need to be addressed in the office promptly.

## 2023-12-23 NOTE — THERAPY EVALUATION
Patient Name: Preston Wallis  : 1965    MRN: [id number]                              Today's Date: 2023       Admit Date: 2023    Visit Dx:     ICD-10-CM ICD-9-CM   1. Closed fracture of medial portion of left tibial plateau, initial encounter  S82.132A 823.00   2. Chronic kidney disease, unspecified CKD stage  N18.9 585.9   3. Chronic obstructive pulmonary disease, unspecified COPD type  J44.9 496   4. Type 2 diabetes mellitus with other specified complication, unspecified whether long term insulin use  E11.69 250.80   5. Immobility  Z74.09 799.89   6. Closed fracture of left tibial plateau, initial encounter  S82.142A 823.00     Patient Active Problem List   Diagnosis    Polyneuropathy    Paroxysmal atrial fibrillation    Obstructive sleep apnea    MRSA pneumonia    Low back pain    Chronic diastolic heart failure    Allergies    COPD exacerbation    Chronic anticoagulation    Benign prostatic hyperplasia    Impaired mobility and endurance    Stage 3a chronic kidney disease    Iron deficiency anemia secondary to inadequate dietary iron intake    Vitamin D deficiency    Class 3 severe obesity with serious comorbidity in adult    Lower extremity edema    Elevated alkaline phosphatase level    Venous insufficiency (chronic) (peripheral)    Tobacco abuse, in remission    History of Pseudomonas pneumonia    Chronic dyspnea    Gastroesophageal reflux disease    Bronchiectasis without complication    ERIC (acute kidney injury)    Altered mental status    Hyperlipidemia    Luetscher's syndrome    Neurogenic bladder    Class 1 obesity    Pneumonia due to Pseudomonas species    Seizures    Primary osteoarthritis of left knee    Other constipation    Chronic obstructive pulmonary disease    Type 2 DM with CKD stage 3 and hypertension    Essential hypertension    Stage 3b chronic kidney disease    Annual physical exam    Long-term use of high-risk medication    Personal history of PE (pulmonary embolism)     Encounter for aftercare for healing closed traumatic fracture of left femur    Chronic pain of left knee    Primary osteoarthritis of right knee    Sepsis    Bronchiectasis    Bacterial pneumonia    Anemia    Closed fracture of left tibial plateau     Past Medical History:   Diagnosis Date    Age-related cognitive decline     Allergic contact dermatitis     Allergies     Anemia     Bronchiectasis with acute lower respiratory infection     Charcot foot due to diabetes mellitus 9/10/2013    Chronic diastolic (congestive) heart failure     Chronic kidney disease     Chronic respiratory failure with hypoxia     Closed supracondylar fracture of femur 1/12/2022    COPD (chronic obstructive pulmonary disease)     Deep vein thrombosis (DVT) of lower extremity associated with air travel 1/13/2023    Dependence on supplemental oxygen     Eczema     Erectile dysfunction     due to organic reasons    Essential (primary) hypertension     Fracture     closed fracture of other tarsal and metatarsal bones    Fracture of proximal humerus 1/13/2023    GERD without esophagitis     High risk medication use     Hypercholesteremia     Hypomagnesemia     Infected stasis ulcer of left lower extremity 1/13/2023    Insomnia     Low back pain     Major depressive disorder     Morbid (severe) obesity due to excess calories     MRSA pneumonia     Muscle weakness     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 without evidence of necrosis     Obstructive sleep apnea (adult) (pediatric)     Other forms of dyspnea     Other long term (current) drug therapy     Other specified noninfective gastroenteritis and colitis     Other spondylosis, lumbar region     Pain in both knees     Paroxysmal atrial fibrillation     Peripheral neuropathy     attributed to type 2 diabetes    Pneumonia, unspecified organism     Polyneuropathy     Rash and other nonspecific skin eruption     Syncope and collapse     Tachycardia      Tinnitus 1/13/2023    Type 1 diabetes mellitus with diabetic chronic kidney disease     Type 2 diabetes mellitus     Unspecified fall, initial encounter     Urinary retention      Past Surgical History:   Procedure Laterality Date    CHOLECYSTECTOMY      CYSTOSCOPY      FEMUR SURGERY Left     Shravan placed    KNEE SURGERY Left     OTHER SURGICAL HISTORY Left     venous port    TONSILLECTOMY AND ADENOIDECTOMY        General Information       Row Name 12/23/23 [facility]6          Physical Therapy Time and Intention    Document Type evaluation  -GJ     Mode of Treatment individual therapy;physical therapy  -GJ       Row Name 12/23/23 [facility]4          General Information    Patient Profile Reviewed yes  -GJ     Prior Level of Function mod assist:;all household mobility;community mobility;transfer;bed mobility;dressing;bathing  -GJ     Existing Precautions/Restrictions fall;non-weight bearing  -GJ     Barriers to Rehab medically complex;previous functional deficit  -GJ       Row Name 12/23/23 1359          Living Environment    People in Home spouse;child(chon), adult  -GJ       Row Name 12/23/23 [facility]0          Home Main Entrance    Number of Stairs, Main Entrance none  -GJ       Row Name 12/23/23 [facility]9          Stairs Within Home, Primary    Number of Stairs, Within Home, Primary none  -GJ       Row Name 12/23/23 [facility]9          Cognition    Orientation Status (Cognition) oriented x 3  -GJ       Row Name 12/23/23 6742          Safety Issues, Functional Mobility    Safety Issues Affecting Function (Mobility) insight into deficits/self-awareness  -GJ     Impairments Affecting Function (Mobility) strength;pain;postural/trunk control;range of motion (ROM)  -GJ               User Key  (r) = Recorded By, (t) = Taken By, (c) = Cosigned By      Initials Name Provider Type    GJ Linden Tafoya, PT Physical Therapist                   Mobility       Row Name 12/23/23 4592          Bed Mobility    Bed Mobility supine-sit;sit-supine  -GJ      Supine-Sit [place] (Bed Mobility) set up;verbal cues;nonverbal cues (demo/gesture);moderate assist (50% patient effort);2 person assist  -GJ     Sit-Supine [place] (Bed Mobility) set up;verbal cues;nonverbal cues (demo/gesture);moderate assist (50% patient effort);2 person assist  -GJ     Assistive Device (Bed Mobility) bed rails;draw sheet;head of bed elevated  -       Row Name 12/23/23 1786          Bed-Chair Transfer    Bed-Chair [place] (Transfers) not tested;other (see comments)  Attempted sit to stand, stand pivot transfer patient unable to clear glutes from bed.  Unable to maintain weightbearing status.  -     Assistive Device (Bed-Chair Transfers) walker, front-wheeled  -       Row Name 12/23/23 5526          Mobility    Extremity Weight-bearing Status left lower extremity  -     Left Lower Extremity (Weight-bearing Status) non weight-bearing (NWB)  -               User Key  (r) = Recorded By, (t) = Taken By, (c) = Cosigned By      Initials Name Provider Type     Linden Tafoya, PT Physical Therapist                   Obj/Interventions       Row Name 12/23/23 0269          Range of Motion Comprehensive    Comment, General Range of Motion Right upper extremity demonstrates approximately 25% of full range of motion, patient reports this is premorbid.  Right ankle demonstrates approximately 50% of full active range of motion, patient reports this is premorbid.  Left lower extremity not tested secondary to recent surgery.  -       Row Name 12/23/23 6815          Strength Comprehensive (MMT)    Comment, General Manual Muscle Testing (MMT) Assessment Bilateral upper extremities grossly 3+ out of 5 within available range right lower extremity manual muscle testing grossly 3+ out of 5 within available range.  -       Row Name 12/23/23 1355          Motor Skills    Therapeutic Exercise ankle  -       Row Name 12/23/23 1359          Ankle (Therapeutic Exercise)    Ankle (Therapeutic  Exercise) AROM (active range of motion)  -     Ankle AROM (Therapeutic Exercise) bilateral;dorsiflexion;plantarflexion;10 repetitions;supine  -               User Key  (r) = Recorded By, (t) = Taken By, (c) = Cosigned By      Initials Name Provider Type    Linden Chaves, PT Physical Therapist                   Goals/Plan       Row Name 12/23/23 1400          Bed Mobility Goal 1 (PT)    Activity/Assistive Device (Bed Mobility Goal 1, PT) sit to supine;supine to sit  -GJ     [place] Level/Cues Needed (Bed Mobility Goal 1, PT) minimum assist (75% or more patient effort)  -GJ     Time Frame (Bed Mobility Goal 1, PT) long term goal (LTG);5 days  -       Row Name 12/23/23 1400          Transfer Goal 1 (PT)    Activity/Assistive Device (Transfer Goal 1, PT) bed-to-chair/chair-to-bed  -GJ     [place] Level/Cues Needed (Transfer Goal 1, PT) minimum assist (75% or more patient effort)  -GJ     Time Frame (Transfer Goal 1, PT) long term goal (LTG);5 days  -       Row Name 12/23/23 1400          Therapy Assessment/Plan (PT)    Planned Therapy Interventions (PT) balance training;bed mobility training;neuromuscular re-education;patient/family education;strengthening;transfer training  -               User Key  (r) = Recorded By, (t) = Taken By, (c) = Cosigned By      Initials Name Provider Type    Linden Chaves, PT Physical Therapist                   Clinical Impression       Row Name 12/23/23 1356          Pain    Pain Intervention(s) Repositioned  -       Row Name 12/23/23 1357          Plan of Care Review    Plan of Care Reviewed With patient  -       Row Name 12/23/23 1355          Therapy Assessment/Plan (PT)    Rehab Potential (PT) fair, will monitor progress closely  -     Criteria for Skilled Interventions Met (PT) yes;skilled treatment is necessary  -     Therapy Frequency (PT) 5 times/wk  -GJ     Predicted Duration of Therapy Intervention (PT) 5 days  -       Row Name 12/23/23  1359          Positioning and Restraints    Pre-Treatment Position in bed  -GJ     Post Treatment Position chair  -GJ     In Chair reclined;call light within reach;encouraged to call for assist;exit alarm on;with nsg  -GJ               User Key  (r) = Recorded By, (t) = Taken By, (c) = Cosigned By      Initials Name Provider Type    Linden Chaves, PT Physical Therapist                   Outcome Measures       Row Name 12/23/23 1401          How much help from another person do you currently need...    Turning from your back to your side while in flat bed without using bedrails? 3  -GJ     Moving from lying on back to sitting on the side of a flat bed without bedrails? 3  -GJ     Moving to and from a bed to a chair (including a wheelchair)? 1  -GJ     Standing up from a chair using your arms (e.g., wheelchair, bedside chair)? 1  -GJ     Climbing 3-5 steps with a railing? 1  -GJ     To walk in hospital room? 1  -GJ     AM-PAC 6 Clicks Score (PT) 10  -GJ     Highest Level of Mobility Goal 4 --> Transfer to chair/commode  -GJ       Row Name 12/23/23 1401          Functional Assessment    Outcome Measure Options AM-PAC 6 Clicks Basic Mobility (PT)  -GJ               User Key  (r) = Recorded By, (t) = Taken By, (c) = Cosigned By      Initials Name Provider Type    Linden Chaves, PT Physical Therapist                                   PT Recommendation and Plan  Planned Therapy Interventions (PT): balance training, bed mobility training, neuromuscular re-education, patient/family education, strengthening, transfer training  Plan of Care Reviewed With: patient     Time Calculation:         PT Charges       Row Name 12/23/23 1404             Time Calculation    Start Time 1100  -GJ      Stop Time 1137  -GJ      Time Calculation (min) 37 min  -GJ      PT Received On 12/23/23  -      PT - Next Appointment 12/26/23  -GJ         Timed Charges    84193 - PT Therapeutic Activity Minutes 30  -GJ         Total Minutes     Timed Charges Total Minutes 30  -GJ       Total Minutes 30  -GJ                User Key  (r) = Recorded By, (t) = Taken By, (c) = Cosigned By      Initials Name Provider Type    Linden Chaves, PT Physical Therapist                  Therapy Charges for Today       Code Description Service Date Service Provider Modifiers Qty    82867394065  PT THERAPEUTIC ACT EA 15 MIN 12/23/2023 Linden Tafoya, PT GP 2    [id number]  PT EVAL HIGH COMPLEXITY 1 12/23/2023 Linden Tafoya, PT GP 1            PT G-Codes  Outcome Measure Options: AM-PAC 6 Clicks Basic Mobility (PT)  AM-PAC 6 Clicks Score (PT): 10  PT Discharge Summary  Anticipated Discharge Disposition (PT): skilled nursing facility    Linden Tafoya PT  12/23/2023

## 2023-12-23 NOTE — PROGRESS NOTES
Name: Preston Wallis ADMIT: 2023   : 1965  PCP: Kimmy Riley MD    MRN: [id number] LOS: 2 days   AGE/SEX: 58 y.o. male  ROOM: [facility]     Subjective   Subjective    Patient is seen at bedside, no new complaints.       Objective   Objective   Vital Signs  Temp:  [97.4 °F (36.3 °C)-98.1 °F (36.7 °C)] 97.4 °F (36.3 °C)  Heart Rate:  [77-90] 83  Resp:  [16] 16  BP: (143-197)/(55-93) 143/55  SpO2:  [93 %-100 %] 93 %  on  Flow (L/min):  [2] 2;   Device (Oxygen Therapy): nasal cannula  Body mass index is 41.35 kg/m².  Physical Exam  General, awake and alert.  Head and ENT, normocephalic and atraumatic.  Lungs, symmetric expansion, equal air entry bilaterally.  Heart, regular rate and rhythm.  Abdomen, soft and nontender.  Extremities, no clubbing or cyanosis.  Neuro, no focal deficits.  Skin: Warm and no rash.  Psych, normal mood and affect.  Musculoskeletal, joint examination is grossly normal.             Copied text material from yesterday's note has been reviewed for appropriate changes and remains accurate as of 23.      Results Review     I reviewed the patient's new clinical results.  Results from last 7 days   Lab Units 23  0440 23  0431 23  0538 23  0516   WBC 10*3/mm3 9.25 10.28 10.22 10.52   HEMOGLOBIN g/dL 10.0* 7.6* 8.1* 7.9*   PLATELETS 10*3/mm3 448 477* 430 375     Results from last 7 days   Lab Units 23  0620 23  0431 23  0538 23  0516   SODIUM mmol/L 137 136 136 138   POTASSIUM mmol/L 4.7 4.5 4.8 5.1   CHLORIDE mmol/L 98 99 99 102   CO2 mmol/L 29.9* 30.0* 28.5 25.0   BUN mg/dL 26* 26* 31* 26*   CREATININE mg/dL 1.43* 1.52* 1.74* 1.48*   GLUCOSE mg/dL 191* 110* 170* 132*   EGFR mL/min/1.73 56.8* 52.8* 44.9* 54.5*     Results from last 7 days   Lab Units 23  0620 23  0431 23  0538 23  0516   ALBUMIN g/dL 3.1* 2.7* 3.0* 2.7*   BILIRUBIN mg/dL 0.2 0.3 0.2 0.2   ALK PHOS U/L 168* 149* 145* 135*   AST (SGOT) U/L  18 15 16 17   ALT (SGPT) U/L 12 16 11 17     Results from last 7 days   Lab Units 12/23/23  0620 12/22/23  0431 12/21/23  0538 12/20/23  0516   CALCIUM mg/dL 9.1 9.0 9.1 9.4   ALBUMIN g/dL 3.1* 2.7* 3.0* 2.7*       Glucose   Date/Time Value Ref Range Status   12/23/2023 1611 180 (H) 70 - 130 mg/dL Final   12/23/2023 1119 130 70 - 130 mg/dL Final   12/23/2023 0720 158 (H) 70 - 130 mg/dL Final   12/22/2023 2016 224 (H) 70 - 130 mg/dL Final   12/22/2023 1609 97 70 - 130 mg/dL Final   12/22/2023 1420 77 70 - 130 mg/dL Final   12/22/2023 1106 114 70 - 130 mg/dL Final       No radiology results for the last day    I have personally reviewed all medications:  Scheduled Medications  apixaban, 2.5 mg, Oral, Q12H  [Held by provider] apixaban, 5 mg, Oral, Q12H  [START ON 12/24/2023] apixaban, 5 mg, Oral, Q12H  atorvastatin, 20 mg, Oral, Nightly  budesonide-formoterol, 2 puff, Inhalation, BID - RT  calcium carbonate (oyster shell), 500 mg, Oral, Daily  castor oil-balsam peru, 1 application , Topical, Q12H  cholecalciferol, 2,000 Units, Oral, Daily  DULoxetine, 60 mg, Oral, BID  famotidine, 40 mg, Oral, QAM  ferrous sulfate, 325 mg, Oral, BID With Meals  finasteride, 5 mg, Oral, Daily  folic acid, 1 mg, Oral, Daily  gabapentin, 300 mg, Oral, Q12H  hydrocortisone-bacitracin-zinc oxide-nystatin, 1 application , Topical, Q12H  insulin glargine, 50 Units, Subcutaneous, Daily  insulin lispro, 2-7 Units, Subcutaneous, 4x Daily AC & at Bedtime  insulin lispro, 8 Units, Subcutaneous, TID AC  magnesium oxide, 400 mg, Oral, Daily  metoprolol tartrate, 100 mg, Oral, BID  montelukast, 10 mg, Oral, Nightly  mupirocin, 1 application , Topical, Q12H  NIFEdipine XL, 30 mg, Oral, QAM  O2, 2 L/min, Inhalation, Once  senna-docusate sodium, 2 tablet, Oral, BID  sodium chloride, 10 mL, Intravenous, Q12H    Infusions   Diet  Diet: Regular/House Diet; Texture: Regular Texture (IDDSI 7); Fluid Consistency: Thin (IDDSI 0)    I have personally  reviewed:  [x]  Laboratory   [x]  Microbiology   [x]  Radiology   [x]  EKG/Telemetry  [x]  Cardiology/Vascular   []  Pathology    []  Records       Assessment/Plan     Active Hospital Problems    Diagnosis  POA    **Closed fracture of left tibial plateau [S82.142A]  Yes    Essential hypertension [I10]  Yes    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       58 y.o. male admitted with Closed fracture of left tibial plateau.    Assessment and plan  1.  Closed fracture of left tibia plateau, management per Ortho.  Continue pain control and physical therapy.     2.  Hypertension, diabetes, COPD, chronic conditions.     3. Paroxysmal atrial fibrillation, patient is currently rate controlled.     4.  Morbid obesity, complicates aspects of care.     5.  CODE STATUS is full code.  Further plans based on hospital course.      Shabbir Peraza MD  [facility]  12/23/23  18:33 EST

## 2023-12-24 LAB
ALBUMIN SERPL-MCNC: 2.9 G/DL (ref 3.5–5.2)
ALBUMIN/GLOB SERPL: 0.8 G/DL
ALP SERPL-CCNC: 155 U/L (ref 39–117)
ALT SERPL W P-5'-P-CCNC: <5 U/L (ref 1–41)
ANION GAP SERPL CALCULATED.3IONS-SCNC: 8.6 MMOL/L (ref 5–15)
AST SERPL-CCNC: 16 U/L (ref 1–40)
BASOPHILS # BLD AUTO: 0.06 10*3/MM3 (ref 0–0.2)
BASOPHILS NFR BLD AUTO: 0.6 % (ref 0–1.5)
BILIRUB SERPL-MCNC: 0.3 MG/DL (ref 0–1.2)
BUN SERPL-MCNC: 28 MG/DL (ref 6–20)
BUN/CREAT SERPL: 15.8 (ref 7–25)
CALCIUM SPEC-SCNC: 8.9 MG/DL (ref 8.6–10.5)
CHLORIDE SERPL-SCNC: 101 MMOL/L (ref 98–107)
CO2 SERPL-SCNC: 29.4 MMOL/L (ref 22–29)
CREAT SERPL-MCNC: 1.77 MG/DL (ref 0.76–1.27)
DEPRECATED RDW RBC AUTO: 44.1 FL (ref 37–54)
EGFRCR SERPLBLD CKD-EPI 2021: 44 ML/MIN/1.73
EOSINOPHIL # BLD AUTO: 0.51 10*3/MM3 (ref 0–0.4)
EOSINOPHIL NFR BLD AUTO: 5 % (ref 0.3–6.2)
ERYTHROCYTE [DISTWIDTH] IN BLOOD BY AUTOMATED COUNT: 13.9 % (ref 12.3–15.4)
GLOBULIN UR ELPH-MCNC: 3.5 GM/DL
GLUCOSE BLDC GLUCOMTR-MCNC: 117 MG/DL (ref 70–130)
GLUCOSE BLDC GLUCOMTR-MCNC: 117 MG/DL (ref 70–130)
GLUCOSE BLDC GLUCOMTR-MCNC: 131 MG/DL (ref 70–130)
GLUCOSE BLDC GLUCOMTR-MCNC: 97 MG/DL (ref 70–130)
GLUCOSE SERPL-MCNC: 102 MG/DL (ref 65–99)
HCT VFR BLD AUTO: 29.1 % (ref 37.5–51)
HGB BLD-MCNC: 9.3 G/DL (ref 13–17.7)
IMM GRANULOCYTES # BLD AUTO: 0.05 10*3/MM3 (ref 0–0.05)
IMM GRANULOCYTES NFR BLD AUTO: 0.5 % (ref 0–0.5)
LYMPHOCYTES # BLD AUTO: 2.43 10*3/MM3 (ref 0.7–3.1)
LYMPHOCYTES NFR BLD AUTO: 23.7 % (ref 19.6–45.3)
MCH RBC QN AUTO: 27.8 PG (ref 26.6–33)
MCHC RBC AUTO-ENTMCNC: 32 G/DL (ref 31.5–35.7)
MCV RBC AUTO: 87.1 FL (ref 79–97)
MONOCYTES # BLD AUTO: 1.8 10*3/MM3 (ref 0.1–0.9)
MONOCYTES NFR BLD AUTO: 17.6 % (ref 5–12)
NEUTROPHILS NFR BLD AUTO: 5.39 10*3/MM3 (ref 1.7–7)
NEUTROPHILS NFR BLD AUTO: 52.6 % (ref 42.7–76)
NRBC BLD AUTO-RTO: 0 /100 WBC (ref 0–0.2)
PLATELET # BLD AUTO: 439 10*3/MM3 (ref 140–450)
PMV BLD AUTO: 8.6 FL (ref 6–12)
POTASSIUM SERPL-SCNC: 4.3 MMOL/L (ref 3.5–5.2)
PROT SERPL-MCNC: 6.4 G/DL (ref 6–8.5)
RBC # BLD AUTO: 3.34 10*6/MM3 (ref 4.14–5.8)
SODIUM SERPL-SCNC: 139 MMOL/L (ref 136–145)
WBC NRBC COR # BLD AUTO: 10.24 10*3/MM3 (ref 3.4–10.8)

## 2023-12-24 PROCEDURE — 94799 UNLISTED PULMONARY SVC/PX: CPT

## 2023-12-24 PROCEDURE — 85025 COMPLETE CBC W/AUTO DIFF WBC: CPT | Performed by: INTERNAL MEDICINE

## 2023-12-24 PROCEDURE — 80053 COMPREHEN METABOLIC PANEL: CPT | Performed by: INTERNAL MEDICINE

## 2023-12-24 PROCEDURE — 82948 REAGENT STRIP/BLOOD GLUCOSE: CPT

## 2023-12-24 PROCEDURE — 63710000001 INSULIN LISPRO (HUMAN) PER 5 UNITS: Performed by: ORTHOPAEDIC SURGERY

## 2023-12-24 PROCEDURE — 63710000001 INSULIN GLARGINE PER 5 UNITS: Performed by: ORTHOPAEDIC SURGERY

## 2023-12-24 PROCEDURE — 94664 DEMO&/EVAL PT USE INHALER: CPT

## 2023-12-24 RX ADMIN — CASTOR OIL AND BALSAM, PERU 1 APPLICATION: 788; 87 OINTMENT TOPICAL at 09:07

## 2023-12-24 RX ADMIN — FOLIC ACID 1 MG: 1 TABLET ORAL at 09:06

## 2023-12-24 RX ADMIN — MAGNESIUM OXIDE 400 MG (241.3 MG MAGNESIUM) TABLET 400 MG: TABLET at 09:06

## 2023-12-24 RX ADMIN — GABAPENTIN 300 MG: 300 CAPSULE ORAL at 20:20

## 2023-12-24 RX ADMIN — DULOXETINE HYDROCHLORIDE 60 MG: 60 CAPSULE, DELAYED RELEASE ORAL at 20:20

## 2023-12-24 RX ADMIN — SENNOSIDES AND DOCUSATE SODIUM 2 TABLET: 50; 8.6 TABLET ORAL at 09:07

## 2023-12-24 RX ADMIN — FERROUS SULFATE TAB 325 MG (65 MG ELEMENTAL FE) 325 MG: 325 (65 FE) TAB at 18:23

## 2023-12-24 RX ADMIN — APIXABAN 2.5 MG: 2.5 TABLET, FILM COATED ORAL at 09:06

## 2023-12-24 RX ADMIN — CASTOR OIL AND BALSAM, PERU 1 APPLICATION: 788; 87 OINTMENT TOPICAL at 20:45

## 2023-12-24 RX ADMIN — BUDESONIDE AND FORMOTEROL FUMARATE DIHYDRATE 2 PUFF: 160; 4.5 AEROSOL RESPIRATORY (INHALATION) at 07:51

## 2023-12-24 RX ADMIN — FAMOTIDINE 40 MG: 20 TABLET ORAL at 06:16

## 2023-12-24 RX ADMIN — METOPROLOL TARTRATE 100 MG: 50 TABLET, FILM COATED ORAL at 20:20

## 2023-12-24 RX ADMIN — GABAPENTIN 300 MG: 300 CAPSULE ORAL at 09:16

## 2023-12-24 RX ADMIN — DULOXETINE HYDROCHLORIDE 60 MG: 60 CAPSULE, DELAYED RELEASE ORAL at 09:05

## 2023-12-24 RX ADMIN — ZINC OXIDE 1 APPLICATION: 200 OINTMENT TOPICAL at 09:08

## 2023-12-24 RX ADMIN — INSULIN LISPRO 8 UNITS: 100 INJECTION, SOLUTION INTRAVENOUS; SUBCUTANEOUS at 18:23

## 2023-12-24 RX ADMIN — MUPIROCIN 1 APPLICATION: 20 OINTMENT TOPICAL at 20:45

## 2023-12-24 RX ADMIN — MONTELUKAST SODIUM 10 MG: 10 TABLET, FILM COATED ORAL at 20:20

## 2023-12-24 RX ADMIN — Medication 2000 UNITS: at 09:06

## 2023-12-24 RX ADMIN — Medication 500 MG: at 09:06

## 2023-12-24 RX ADMIN — FINASTERIDE 5 MG: 5 TABLET, FILM COATED ORAL at 09:06

## 2023-12-24 RX ADMIN — METOPROLOL TARTRATE 100 MG: 50 TABLET, FILM COATED ORAL at 09:05

## 2023-12-24 RX ADMIN — INSULIN LISPRO 8 UNITS: 100 INJECTION, SOLUTION INTRAVENOUS; SUBCUTANEOUS at 12:23

## 2023-12-24 RX ADMIN — FERROUS SULFATE TAB 325 MG (65 MG ELEMENTAL FE) 325 MG: 325 (65 FE) TAB at 09:06

## 2023-12-24 RX ADMIN — Medication 10 ML: at 20:22

## 2023-12-24 RX ADMIN — HYDROCODONE BITARTRATE AND ACETAMINOPHEN 1 TABLET: 5; 325 TABLET ORAL at 12:26

## 2023-12-24 RX ADMIN — INSULIN GLARGINE 50 UNITS: 100 INJECTION, SOLUTION SUBCUTANEOUS at 09:05

## 2023-12-24 RX ADMIN — NIFEDIPINE 30 MG: 30 TABLET, FILM COATED, EXTENDED RELEASE ORAL at 06:16

## 2023-12-24 RX ADMIN — ATORVASTATIN CALCIUM 20 MG: 20 TABLET, FILM COATED ORAL at 20:20

## 2023-12-24 RX ADMIN — APIXABAN 5 MG: 5 TABLET, FILM COATED ORAL at 20:20

## 2023-12-24 RX ADMIN — ZINC OXIDE 1 APPLICATION: 200 OINTMENT TOPICAL at 20:45

## 2023-12-24 RX ADMIN — INSULIN LISPRO 8 UNITS: 100 INJECTION, SOLUTION INTRAVENOUS; SUBCUTANEOUS at 09:04

## 2023-12-24 RX ADMIN — MUPIROCIN 1 APPLICATION: 20 OINTMENT TOPICAL at 09:08

## 2023-12-24 RX ADMIN — BUDESONIDE AND FORMOTEROL FUMARATE DIHYDRATE 2 PUFF: 160; 4.5 AEROSOL RESPIRATORY (INHALATION) at 22:00

## 2023-12-24 RX ADMIN — Medication 10 ML: at 09:16

## 2023-12-24 RX ADMIN — SENNOSIDES AND DOCUSATE SODIUM 2 TABLET: 50; 8.6 TABLET ORAL at 20:20

## 2023-12-24 NOTE — PROGRESS NOTES
[facility] Consult Note      Patient Name: Preston Wallis  : 1965  MRN: [id number]  Primary Care Physician:  Kimmy Riley MD  Referring Physician: No ref. provider found  Date of admission: 2023    Subjective     Reason for Consult:   CKD     HPI:     Mrs. Preston Wallis is a pleasant 58-year-old  male with morbid obesity BMI of 31, history of tobacco abuse with chronic obstructive pulmonary disease on chronic supplemental oxygen at 2 L/min, longstanding diabetes mellitus type 2 over 30 years with extensive complications including neuropathy gastroparesis and nephropathy with a neurogenic bladder requiring self bladder catheterization, congestive heart failure, chronic atrial fibrillation, hypertension, dyslipidemia, chronic spinal disc degenerative disease with pain syndrome    Patient was admitted for COPD exacerbation to outside facility [facility] found to have a left lower lung pneumonia with isolation of Achromobacter xylosoxidan .  She was transferred to [facility] to continue medical care.  On 2023 and discharged on 2023     Unfortunately the patient sustained a fall upon discharge having a proximal left tibia and fibula fractures on the left side.  And require surgical repair on 2023    Nephrology consultation been requested for further management     Review of Systems:   14 point review of systems is otherwise negative except for mentioned above on HPI    Personal History     Past Medical History:   Diagnosis Date    Age-related cognitive decline     Allergic contact dermatitis     Allergies     Anemia     Bronchiectasis with acute lower respiratory infection     Charcot foot due to diabetes mellitus 9/10/2013    Chronic diastolic (congestive) heart failure     Chronic kidney disease     Chronic respiratory failure with hypoxia     Closed supracondylar fracture of femur 2022    COPD (chronic obstructive  pulmonary disease)     Deep vein thrombosis (DVT) of lower extremity associated with air travel 1/13/2023    Dependence on supplemental oxygen     Eczema     Erectile dysfunction     due to organic reasons    Essential (primary) hypertension     Fracture     closed fracture of other tarsal and metatarsal bones    Fracture of proximal humerus 1/13/2023    GERD without esophagitis     High risk medication use     Hypercholesteremia     Hypomagnesemia     Infected stasis ulcer of left lower extremity 1/13/2023    Insomnia     Low back pain     Major depressive disorder     Morbid (severe) obesity due to excess calories     MRSA pneumonia     Muscle weakness     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 without evidence of necrosis     Obstructive sleep apnea (adult) (pediatric)     Other forms of dyspnea     Other long term (current) drug therapy     Other specified noninfective gastroenteritis and colitis     Other spondylosis, lumbar region     Pain in both knees     Paroxysmal atrial fibrillation     Peripheral neuropathy     attributed to type 2 diabetes    Pneumonia, unspecified organism     Polyneuropathy     Rash and other nonspecific skin eruption     Syncope and collapse     Tachycardia     Tinnitus 1/13/2023    Type 1 diabetes mellitus with diabetic chronic kidney disease     Type 2 diabetes mellitus     Unspecified fall, initial encounter     Urinary retention        Past Surgical History:   Procedure Laterality Date    CHOLECYSTECTOMY      CYSTOSCOPY      FEMUR SURGERY Left     Shravan placed    KNEE SURGERY Left     OTHER SURGICAL HISTORY Left     venous port    TONSILLECTOMY AND ADENOIDECTOMY         Family History: family history includes Asthma in his father; Cancer in his sister; Coronary artery disease in his mother; Diabetes type II in his mother and sister; Hypertension in his mother.    Social History:  reports that he quit smoking about 30 years ago. His  smoking use included cigarettes. He started smoking about 43 years ago. He has a 12.00 pack-year smoking history. He has never used smokeless tobacco. He reports that he does not currently use alcohol. He reports that he does not use drugs.    Home Medications:  Prior to Admission medications    Medication Sig Start Date End Date Taking? Authorizing Provider   apixaban (Eliquis) 5 MG tablet tablet Take 1 tablet by mouth Every 12 (Twelve) Hours. 10/26/23  Yes Kimmy Riley MD   atorvastatin (LIPITOR) 20 MG tablet Take 1 tablet by mouth Every Night. 10/26/23  Yes Kimmy Riley MD   B-D UF III MINI PEN NEEDLES 31G X 5 MM misc USE FOUR TIMES DAILY BEFORE MEALS AND AT BEDTIME 9/12/23  Yes Neli Paredes APRN   Budeson-Glycopyrrol-Formoterol (Breztri Aerosphere) 160-9-4.8 MCG/ACT aerosol inhaler  3/20/23  Yes ProviderBreann MD   calcium citrate (CALCITRATE) 950 (200 Ca) MG tablet Take 1 tablet by mouth Daily. 10/26/23  Yes Kimmy Riley MD   Cholecalciferol (Vitamin D3) 50 MCG (2000 UT) tablet Take 1 tablet by mouth Daily. 10/26/23  Yes Kimmy Riley MD   dapagliflozin (Farxiga) 5 MG tablet tablet Take 1 tablet by mouth Daily. 10/26/23  Yes Kimmy Riley MD   docusate sodium (COLACE) 100 MG capsule Take 1 capsule by mouth Daily. 10/26/23  Yes Kimmy Riley MD   DULoxetine (CYMBALTA) 60 MG capsule Take 1 capsule by mouth 2 (Two) Times a Day. 10/26/23  Yes Kimmy Riley MD   famotidine (PEPCID) 40 MG tablet Take 1 tablet by mouth Every Morning. 10/26/23  Yes Kimmy Riley MD   ferrous gluconate (FERGON) 324 MG tablet Take 1 tablet by mouth Daily With Breakfast. 10/26/23  Yes Kimmy Riley MD   finasteride (PROSCAR) 5 MG tablet Take 1 tablet by mouth Daily. 10/26/23  Yes Kimmy Riley MD   Fluticasone-Salmeterol (ADVAIR/WIXELA) 500-50 MCG/ACT DISKUS Inhale 1 puff 2 (Two) Times a Day.   Yes Provider, MD Breann   folic acid (FOLVITE) 1 MG  tablet Take 1 tablet by mouth Daily. 10/26/23  Yes Kimmy Riley MD   gabapentin (NEURONTIN) 300 MG capsule TAKE ONE CAPSULE BY MOUTH EVERY MORNING AND TAKE TWO CAPSULES BY MOUTH EVERY NIGHT AT BEDTIME 10/26/23  Yes Kimmy Riley MD   Insulin Glargine (Lantus SoloStar) 100 UNIT/ML injection pen Inject 50 Units under the skin into the appropriate area as directed Daily. 12/14/23  Yes Deshawn Moser MD   Insulin Lispro, 1 Unit Dial, (HUMALOG) 100 UNIT/ML solution pen-injector inject EIGHT units UNDER THE SKIN INTO THE APPROPRIATE AREA THREE TIMES DAILY PER sliding scale, IF BLOOD SUGAR < 160= 0 units, 161-220= 2 units, 221-280= 4 units, 281-340 = SIX units, 341-400 = EIGHT units 11/9/23  Yes Neli Paredes APRN   magnesium oxide (MAG-OX) 400 tablet tablet Take 1 tablet by mouth Daily. Started by [place]   Yes Breann Shukla MD   metoprolol tartrate (LOPRESSOR) 100 MG tablet Take 1 tablet by mouth 2 (Two) Times a Day. 10/26/23  Yes Kimmy Riley MD   montelukast (SINGULAIR) 10 MG tablet Take 1 tablet by mouth every night at bedtime. 10/26/23  Yes Kimmy Riley MD   NIFEdipine XL (PROCARDIA XL) 30 MG 24 hr tablet Take 1 tablet by mouth Every Morning. 10/26/23  Yes Kimmy Riley MD   O2 (OXYGEN) 2 Liter O2 - CONTINUOUS (route: Oxygen) 2/1/22  Yes ProviderBreann MD   ondansetron ODT (ZOFRAN-ODT) 4 MG disintegrating tablet PLACE 1 TABLET ON THE TONGUE EVERY 8 HOURS AS NEEDED FOR NAUSEA AND VOMITING 11/22/23  Yes Kimmy Riley MD   Semaglutide, 1 MG/DOSE, (Ozempic, 1 MG/DOSE,) 4 MG/3ML solution pen-injector Inject 1 mg under the skin into the appropriate area as directed 1 (One) Time Per Week. 11/10/23  Yes Neli Paredes APRN   Ventolin  (90 Base) MCG/ACT inhaler INHALE 2 PUFFS FOUR TIMES DAILY AS NEEDED FOR WHEEZING 7/13/23  Yes Betzaida Nelson APRN   Continuous Blood Gluc  (FreeStyle Bethany 2 Tintah) device  1/17/23   Provider,  MD Breann   Continuous Blood Gluc Sensor (FreeStyle Bethany 2 Sensor) misc USE every 14 DAYS 11/8/23   Neli Paredes APRN   exenatide er (Bydureon BCise) 2 MG/0.85ML auto-injector injection Inject 0.85 mL under the skin into the appropriate area as directed 1 (One) Time Per Week.    Provider, MD Breann   glucose blood test strip 1 each by Other route Daily. Dx:   E11.40 1/25/23   Kimmy Riley MD   TRUEplus Lancets 28G misc USE AS DIRECTED 7/29/21   Kimmy Riley MD       Allergies:  Allergies   Allergen Reactions    Benadryl [Diphenhydramine] Itching    Proventil [Albuterol] Other (See Comments)     Mouth sores         Objective     Vitals:   Temp:  [97.4 °F (36.3 °C)-98.2 °F (36.8 °C)] 98.2 °F (36.8 °C)  Heart Rate:  [76-90] 90  Resp:  [16-18] 16  BP: (143-158)/(55-79) 143/79  Flow (L/min):  [2] 2    Intake/Output Summary (Last 24 hours) at 12/24/2023 1559  Last data filed at 12/24/2023 1347  Gross per 24 hour   Intake 900 ml   Output 2550 ml   Net -1650 ml       Physical Exam:   Constitutional: Awake, alert, no acute distress.  Morbid obese BMI 41.  Currently on nasal cannula  HEENT: Sclera anicteric, no conjunctival injection  Neck: Supple, no thyromegaly, no lymphadenopathy, trachea at midline, no JVD  Respiratory: Diffuse crackles bibasilar  Cardiovascular: RRR, systolic extra murmur  Gastrointestinal: Positive bowel sounds, abdomen is soft, nontender and nondistended  :   Doyle catheter in place  Musculoskeletal: Bilateral edema, no clubbing or cyanosis  Psychiatric: Appropriate affect, cooperative  Neurologic: Oriented x3, moving all extremities, normal speech and mental status  Skin: Warm and dry       Scheduled Meds:     [Held by provider] apixaban, 5 mg, Oral, Q12H  apixaban, 5 mg, Oral, Q12H  atorvastatin, 20 mg, Oral, Nightly  budesonide-formoterol, 2 puff, Inhalation, BID - RT  calcium carbonate (oyster shell), 500 mg, Oral, Daily  castor oil-balsam peru, 1 application ,  Topical, Q12H  cholecalciferol, 2,000 Units, Oral, Daily  DULoxetine, 60 mg, Oral, BID  famotidine, 40 mg, Oral, QAM  ferrous sulfate, 325 mg, Oral, BID With Meals  finasteride, 5 mg, Oral, Daily  folic acid, 1 mg, Oral, Daily  gabapentin, 300 mg, Oral, Q12H  hydrocortisone-bacitracin-zinc oxide-nystatin, 1 application , Topical, Q12H  insulin glargine, 50 Units, Subcutaneous, Daily  insulin lispro, 2-7 Units, Subcutaneous, 4x Daily AC & at Bedtime  insulin lispro, 8 Units, Subcutaneous, TID AC  magnesium oxide, 400 mg, Oral, Daily  metoprolol tartrate, 100 mg, Oral, BID  montelukast, 10 mg, Oral, Nightly  mupirocin, 1 application , Topical, Q12H  NIFEdipine XL, 30 mg, Oral, QAM  O2, 2 L/min, Inhalation, Once  senna-docusate sodium, 2 tablet, Oral, BID  sodium chloride, 10 mL, Intravenous, Q12H      IV Meds:        Results Reviewed:   I have personally reviewed the results from the time of this admission to 12/24/2023 15:59 EST     Lab Results   Component Value Date    GLUCOSE 102 (H) 12/24/2023    CALCIUM 8.9 12/24/2023     12/24/2023    K 4.3 12/24/2023    CO2 29.4 (H) 12/24/2023     12/24/2023    BUN 28 (H) 12/24/2023    CREATININE 1.77 (H) 12/24/2023    EGFRIFNONA 46 (L) 07/27/2021    BCR 15.8 12/24/2023    ANIONGAP 8.6 12/24/2023      Lab Results   Component Value Date    MG 1.9 12/14/2023    PHOS 3.8 12/14/2023    ALBUMIN 2.9 (L) 12/24/2023           Assessment / Plan       Closed fracture of left tibial plateau    Polyneuropathy    Paroxysmal atrial fibrillation    Obstructive sleep apnea    Chronic diastolic heart failure    Chronic obstructive pulmonary disease    Type 2 DM with CKD stage 3 and hypertension    Essential hypertension      ASSESSMENT:    Chronic kidney disease stage IIIb.  Baseline creatinine 1.6-1.8 follow-up closely by Dr. Martínez , secondary to diabetic nephropathy and subnephrotic proteinuria of 2.8 g associated with neurogenic bladder with chronic self bladder  catheterization.  .  Currently renal function is at baseline    Neurogenic bladder with a chronic bladder catheterization currently Doyle catheter in place.  Catheter care by nursing staff    proximal left tibia and fibula fractures on the left side. S/p  surgical repair on 12/22/2023 by orthopedics followed closely.     Diabetes Mellitus type 2 w/ complications ( , gastroparesis , neuropathy , nephropathy ) .Continue insulin regimen / hypoglycemic regimen .Hypoglycemic protocol . POC glucose 4 x day .Diabetic diet    Paroxysmal atrial fibrillation anticoagulated on apixaban and rate control metoprolol     Hypertension with CKD/currently on nifedipine metoprolol.  And heart healthy diet    PLAN:  -Currently renal function at baseline electrolytes  and volume status stable  -Will continue to follow closely with surveillance labs      Thank you for involving us in the care of Preston Wallis.  Please feel free to call with any questions.    Jose Bansal MD  12/24/23  15:59 EST    [facility]  [phone]      Please note that portions of this note were completed with a voice recognition program.

## 2023-12-24 NOTE — PLAN OF CARE
A/Ox4. O2 at 2-3L/min per NC. No s/s distress. POD#2 left tibial plateau ORIF. Dressing w/ moderate amount of bloody discharge, changed dressing this shift. W/ knee immobilizer. NWB to the LLE. On Eliquis q12H. PRN Norco for pain. Voiding per Doyle. Chest port accessed, flushes well, good blood return, caps and rubber port changed this morning, CHG wipes done. Elvia lift for transfers. Wound care done. Plan to d/c at SNF.

## 2023-12-24 NOTE — PROGRESS NOTES
Name: Preston Wallis ADMIT: 2023   : 1965  PCP: Kimmy Riley MD    MRN: [id number] LOS: 3 days   AGE/SEX: 58 y.o. male  ROOM: [facility]     Subjective   Subjective   Patient is seen at bedside, no new complaints.       Objective   Objective   Vital Signs  Temp:  [97.4 °F (36.3 °C)-98.2 °F (36.8 °C)] 98.2 °F (36.8 °C)  Heart Rate:  [76-90] 90  Resp:  [16-18] 16  BP: (143-158)/(55-79) 143/79  SpO2:  [93 %-97 %] 96 %  on  Flow (L/min):  [2] 2;   Device (Oxygen Therapy): nasal cannula  Body mass index is 41.35 kg/m².  Physical Exam  General, awake and alert.  Head and ENT, normocephalic and atraumatic.  Lungs, symmetric expansion, equal air entry bilaterally.  Heart, regular rate and rhythm.  Abdomen, soft and nontender.  Extremities, no clubbing or cyanosis.  Neuro, no focal deficits.  Skin: Warm and no rash.  Psych, normal mood and affect.  Musculoskeletal, joint examination is grossly normal.             Copied text material from yesterday's note has been reviewed for appropriate changes and remains accurate as of 23.       Results Review     I reviewed the patient's new clinical results.  Results from last 7 days   Lab Units 23  0616 23  0440 23  0431 23  0538   WBC 10*3/mm3 10.24 9.25 10.28 10.22   HEMOGLOBIN g/dL 9.3* 10.0* 7.6* 8.1*   PLATELETS 10*3/mm3 439 448 477* 430     Results from last 7 days   Lab Units 23  0616 23  0620 23  0431 23  0538   SODIUM mmol/L 139 137 136 136   POTASSIUM mmol/L 4.3 4.7 4.5 4.8   CHLORIDE mmol/L 101 98 99 99   CO2 mmol/L 29.4* 29.9* 30.0* 28.5   BUN mg/dL 28* 26* 26* 31*   CREATININE mg/dL 1.77* 1.43* 1.52* 1.74*   GLUCOSE mg/dL 102* 191* 110* 170*   EGFR mL/min/1.73 44.0* 56.8* 52.8* 44.9*     Results from last 7 days   Lab Units 23  0616 23  0620 23  0431 23  0538   ALBUMIN g/dL 2.9* 3.1* 2.7* 3.0*   BILIRUBIN mg/dL 0.3 0.2 0.3 0.2   ALK PHOS U/L 155* 168* 149* 145*   AST (SGOT)  U/L 16 18 15 16   ALT (SGPT) U/L <5 12 16 11     Results from last 7 days   Lab Units 12/24/23  0616 12/23/23  0620 12/22/23  0431 12/21/23  0538   CALCIUM mg/dL 8.9 9.1 9.0 9.1   ALBUMIN g/dL 2.9* 3.1* 2.7* 3.0*       Glucose   Date/Time Value Ref Range Status   12/24/2023 1122 131 (H) 70 - 130 mg/dL Final   12/24/2023 0727 97 70 - 130 mg/dL Final   12/23/2023 2049 160 (H) 70 - 130 mg/dL Final   12/23/2023 1611 180 (H) 70 - 130 mg/dL Final   12/23/2023 1119 130 70 - 130 mg/dL Final   12/23/2023 0720 158 (H) 70 - 130 mg/dL Final   12/22/2023 2016 224 (H) 70 - 130 mg/dL Final       No radiology results for the last day    I have personally reviewed all medications:  Scheduled Medications  [Held by provider] apixaban, 5 mg, Oral, Q12H  apixaban, 5 mg, Oral, Q12H  atorvastatin, 20 mg, Oral, Nightly  budesonide-formoterol, 2 puff, Inhalation, BID - RT  calcium carbonate (oyster shell), 500 mg, Oral, Daily  castor oil-balsam peru, 1 application , Topical, Q12H  cholecalciferol, 2,000 Units, Oral, Daily  DULoxetine, 60 mg, Oral, BID  famotidine, 40 mg, Oral, QAM  ferrous sulfate, 325 mg, Oral, BID With Meals  finasteride, 5 mg, Oral, Daily  folic acid, 1 mg, Oral, Daily  gabapentin, 300 mg, Oral, Q12H  hydrocortisone-bacitracin-zinc oxide-nystatin, 1 application , Topical, Q12H  insulin glargine, 50 Units, Subcutaneous, Daily  insulin lispro, 2-7 Units, Subcutaneous, 4x Daily AC & at Bedtime  insulin lispro, 8 Units, Subcutaneous, TID AC  magnesium oxide, 400 mg, Oral, Daily  metoprolol tartrate, 100 mg, Oral, BID  montelukast, 10 mg, Oral, Nightly  mupirocin, 1 application , Topical, Q12H  NIFEdipine XL, 30 mg, Oral, QAM  O2, 2 L/min, Inhalation, Once  senna-docusate sodium, 2 tablet, Oral, BID  sodium chloride, 10 mL, Intravenous, Q12H    Infusions   Diet  Diet: Regular/House Diet; Texture: Regular Texture (IDDSI 7); Fluid Consistency: Thin (IDDSI 0)    I have personally reviewed:  [x]  Laboratory   [x]  Microbiology    [x]  Radiology   [x]  EKG/Telemetry  [x]  Cardiology/Vascular   []  Pathology    []  Records       Assessment/Plan     Active Hospital Problems    Diagnosis  POA    **Closed fracture of left tibial plateau [S82.142A]  Yes    Essential hypertension [I10]  Yes    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       58 y.o. male admitted with Closed fracture of left tibial plateau.    Assessment and plan  1.  Closed fracture of left tibia plateau, management per Ortho.  Continue pain control and physical therapy.     2.  Hypertension, diabetes, COPD, chronic conditions.     3. Paroxysmal atrial fibrillation, patient is currently rate controlled.     4.  Morbid obesity, complicates aspects of care.    5.  Acute chronic kidney injury, nephrology evaluation.     6.  CODE STATUS is full code.  Further plans based on hospital course.       Shabbir Peraza MD  [facility]  12/24/23  14:41 EST

## 2023-12-24 NOTE — PLAN OF CARE
Goal Outcome Evaluation:              Outcome Evaluation: Pt remains stable. NWB to LLE. Dressing C/D/I. Knee immobilizer in place. Doyle in place and to stay in place until after rehab per MD. Elvia lift at bedside. D/C plan for SNF. Will continue to monitor. Educated on glucose monitoring. Laxatives given.

## 2023-12-24 NOTE — PLAN OF CARE
Goal Outcome Evaluation:POD1 Left tibia ORIF, VSS, afebrile, O2 @2l/nc, restarted on Eliquis today, up in chair today with therapy via lift. Accucheck with s/s coverage, FC draining clear yellow urine for urinary retention, Right chest port accessed flushes well with good blood return, caps changed. Labs ordered for tomorrow am.

## 2023-12-24 NOTE — PROGRESS NOTES
Orthopedic Progress Note      Patient: Preston Wallis  Date of Admission: 12/14/2023  YOB: 1965  Medical Record Number: [id number]    POD # :  2 Days Post-Op Procedure(s) (LRB):  TIBIAL PLATEAU OPEN REDUCTION INTERNAL FIXATION (Left)    Systemic or Specific Complaints: No Complaints    Pain Relief: some relief    Physical Exam:  58 y.o.  male  Vitals:  Temp:  [97.4 °F (36.3 °C)-98.2 °F (36.8 °C)] 98.2 °F (36.8 °C)  Heart Rate:  [76-90] 90  Resp:  [16-18] 18  BP: (143-177)/(55-85) 152/75  alert and oriented  Chest: Clear to auscultation  CV: Regular Rate and Rhythm  Abd: Soft, NT, with BS +  Ext: NV intact. ROM appropriate. Calf is soft and nontender. Negative Homans sign  Skin: Incision clean dry and intact w/out signs or  symptoms of infection.    Activity: Limited - Bed to chair transfers only  Weight Bearing: NWB LLE in knee immobilizer    Data Review     No results displayed because visit has over 200 results.          No results found.    Medications:  [Held by provider] apixaban, 5 mg, Oral, Q12H  apixaban, 5 mg, Oral, Q12H  atorvastatin, 20 mg, Oral, Nightly  budesonide-formoterol, 2 puff, Inhalation, BID - RT  calcium carbonate (oyster shell), 500 mg, Oral, Daily  castor oil-balsam peru, 1 application , Topical, Q12H  cholecalciferol, 2,000 Units, Oral, Daily  DULoxetine, 60 mg, Oral, BID  famotidine, 40 mg, Oral, QAM  ferrous sulfate, 325 mg, Oral, BID With Meals  finasteride, 5 mg, Oral, Daily  folic acid, 1 mg, Oral, Daily  gabapentin, 300 mg, Oral, Q12H  hydrocortisone-bacitracin-zinc oxide-nystatin, 1 application , Topical, Q12H  insulin glargine, 50 Units, Subcutaneous, Daily  insulin lispro, 2-7 Units, Subcutaneous, 4x Daily AC & at Bedtime  insulin lispro, 8 Units, Subcutaneous, TID AC  magnesium oxide, 400 mg, Oral, Daily  metoprolol tartrate, 100 mg, Oral, BID  montelukast, 10 mg, Oral, Nightly  mupirocin, 1 application , Topical, Q12H  NIFEdipine XL, 30 mg, Oral, QAM  O2, 2 L/min,  Inhalation, Once  senna-docusate sodium, 2 tablet, Oral, BID  sodium chloride, 10 mL, Intravenous, Q12H        acetaminophen    albuterol    senna-docusate sodium **AND** polyethylene glycol **AND** bisacodyl **AND** bisacodyl    dextrose    dextrose    glucagon (human recombinant)    heparin    HYDROcodone-acetaminophen    melatonin    [] HYDROmorphone **AND** naloxone    ondansetron **OR** ondansetron    Insert Peripheral IV **AND** sodium chloride    Access VAD **AND** sodium chloride    sodium chloride    sodium chloride    sodium chloride    Assessment:  Doing well POD  # 2 Days Post-Op Procedure(s) (LRB):  TIBIAL PLATEAU OPEN REDUCTION INTERNAL FIXATION (Left)  Problems Addressed this Visit          Musculoskeletal and Injuries    * (Principal) Closed fracture of left tibial plateau    Relevant Orders    Case Request (Completed)       Pulmonary and Pneumonias    Chronic obstructive pulmonary disease    Relevant Medications    budesonide-formoterol (SYMBICORT) 160-4.5 MCG/ACT inhaler 2 puff    montelukast (SINGULAIR) tablet 10 mg    albuterol (ACCUNEB) nebulizer solution 0.63 mg     Other Visit Diagnoses       Closed fracture of medial portion of left tibial plateau, initial encounter    -  Primary    Relevant Orders    [facility] 05.  Knee Immobilizer (Completed)    Chronic kidney disease, unspecified CKD stage        Type 2 diabetes mellitus with other specified complication, unspecified whether long term insulin use        Relevant Medications    dextrose (GLUTOSE) oral gel 15 g    glucagon (GLUCAGEN) injection 1 mg    insulin lispro (HUMALOG/ADMELOG) injection 2-7 Units    insulin glargine (LANTUS, SEMGLEE) injection 50 Units    insulin lispro (HUMALOG/ADMELOG) injection 8 Units    Immobility              Diagnoses         Codes Comments    Closed fracture of medial portion of left tibial plateau, initial encounter    -  Primary ICD-10-CM: S82.132A  ICD-9-CM: 823.00     Chronic kidney  disease, unspecified CKD stage     ICD-10-CM: N18.9  ICD-9-CM: 585.9     Chronic obstructive pulmonary disease, unspecified COPD type     ICD-10-CM: J44.9  ICD-9-CM: 496     Type 2 diabetes mellitus with other specified complication, unspecified whether long term insulin use     ICD-10-CM: E11.69  ICD-9-CM: 250.80     Immobility     ICD-10-CM: Z74.09  ICD-9-CM: 799.89     Closed fracture of left tibial plateau, initial encounter     ICD-10-CM: S82.142A  ICD-9-CM: 823.00             Plan:  NWB LLE with knee immobilizer intact  Bed to chair transfers only  Continue Pain Control Measures - Orals  Continue incisional Care - change if soiled  DVT prophylaxis - cont Elishahriar  F/U with Dr. Kline in two weeks for re-evaluation in clinic    Discharge Plan:Rehab - ok to d/c from orthopedic standpoint    CON Ortiz    Date: 12/24/2023  Time: 09:44 EST

## 2023-12-25 LAB
BH BB BLOOD EXPIRATION DATE: NORMAL
BH BB BLOOD TYPE BARCODE: NORMAL
BH BB DISPENSE STATUS: NORMAL
BH BB PRODUCT CODE: NORMAL
BH BB UNIT NUMBER: NORMAL
CROSSMATCH INTERPRETATION: NORMAL
GLUCOSE BLDC GLUCOMTR-MCNC: 101 MG/DL (ref 70–130)
GLUCOSE BLDC GLUCOMTR-MCNC: 118 MG/DL (ref 70–130)
GLUCOSE BLDC GLUCOMTR-MCNC: 87 MG/DL (ref 70–130)
GLUCOSE BLDC GLUCOMTR-MCNC: 96 MG/DL (ref 70–130)
UNIT  ABO: NORMAL
UNIT  RH: NORMAL

## 2023-12-25 PROCEDURE — 94799 UNLISTED PULMONARY SVC/PX: CPT

## 2023-12-25 PROCEDURE — 94761 N-INVAS EAR/PLS OXIMETRY MLT: CPT

## 2023-12-25 PROCEDURE — 63710000001 INSULIN LISPRO (HUMAN) PER 5 UNITS: Performed by: ORTHOPAEDIC SURGERY

## 2023-12-25 PROCEDURE — 82948 REAGENT STRIP/BLOOD GLUCOSE: CPT

## 2023-12-25 PROCEDURE — 94664 DEMO&/EVAL PT USE INHALER: CPT

## 2023-12-25 PROCEDURE — 94760 N-INVAS EAR/PLS OXIMETRY 1: CPT

## 2023-12-25 PROCEDURE — 63710000001 INSULIN GLARGINE PER 5 UNITS: Performed by: ORTHOPAEDIC SURGERY

## 2023-12-25 RX ADMIN — Medication 10 ML: at 08:38

## 2023-12-25 RX ADMIN — FERROUS SULFATE TAB 325 MG (65 MG ELEMENTAL FE) 325 MG: 325 (65 FE) TAB at 08:37

## 2023-12-25 RX ADMIN — APIXABAN 5 MG: 5 TABLET, FILM COATED ORAL at 21:33

## 2023-12-25 RX ADMIN — MAGNESIUM OXIDE 400 MG (241.3 MG MAGNESIUM) TABLET 400 MG: TABLET at 08:37

## 2023-12-25 RX ADMIN — FINASTERIDE 5 MG: 5 TABLET, FILM COATED ORAL at 08:37

## 2023-12-25 RX ADMIN — APIXABAN 5 MG: 5 TABLET, FILM COATED ORAL at 08:37

## 2023-12-25 RX ADMIN — DULOXETINE HYDROCHLORIDE 60 MG: 60 CAPSULE, DELAYED RELEASE ORAL at 08:37

## 2023-12-25 RX ADMIN — GABAPENTIN 300 MG: 300 CAPSULE ORAL at 21:33

## 2023-12-25 RX ADMIN — SENNOSIDES AND DOCUSATE SODIUM 2 TABLET: 50; 8.6 TABLET ORAL at 08:37

## 2023-12-25 RX ADMIN — METOPROLOL TARTRATE 100 MG: 50 TABLET, FILM COATED ORAL at 08:37

## 2023-12-25 RX ADMIN — CASTOR OIL AND BALSAM, PERU 1 APPLICATION: 788; 87 OINTMENT TOPICAL at 21:35

## 2023-12-25 RX ADMIN — METOPROLOL TARTRATE 100 MG: 50 TABLET, FILM COATED ORAL at 21:33

## 2023-12-25 RX ADMIN — INSULIN LISPRO 8 UNITS: 100 INJECTION, SOLUTION INTRAVENOUS; SUBCUTANEOUS at 11:53

## 2023-12-25 RX ADMIN — MUPIROCIN 1 APPLICATION: 20 OINTMENT TOPICAL at 08:38

## 2023-12-25 RX ADMIN — Medication 10 ML: at 21:33

## 2023-12-25 RX ADMIN — ZINC OXIDE 1 APPLICATION: 200 OINTMENT TOPICAL at 08:38

## 2023-12-25 RX ADMIN — BUDESONIDE AND FORMOTEROL FUMARATE DIHYDRATE 2 PUFF: 160; 4.5 AEROSOL RESPIRATORY (INHALATION) at 08:06

## 2023-12-25 RX ADMIN — FERROUS SULFATE TAB 325 MG (65 MG ELEMENTAL FE) 325 MG: 325 (65 FE) TAB at 18:42

## 2023-12-25 RX ADMIN — NIFEDIPINE 30 MG: 30 TABLET, FILM COATED, EXTENDED RELEASE ORAL at 06:17

## 2023-12-25 RX ADMIN — ATORVASTATIN CALCIUM 20 MG: 20 TABLET, FILM COATED ORAL at 21:33

## 2023-12-25 RX ADMIN — ZINC OXIDE 1 APPLICATION: 200 OINTMENT TOPICAL at 21:34

## 2023-12-25 RX ADMIN — Medication 2000 UNITS: at 08:37

## 2023-12-25 RX ADMIN — CASTOR OIL AND BALSAM, PERU 1 APPLICATION: 788; 87 OINTMENT TOPICAL at 08:37

## 2023-12-25 RX ADMIN — BUDESONIDE AND FORMOTEROL FUMARATE DIHYDRATE 2 PUFF: 160; 4.5 AEROSOL RESPIRATORY (INHALATION) at 19:12

## 2023-12-25 RX ADMIN — Medication 500 MG: at 08:37

## 2023-12-25 RX ADMIN — INSULIN GLARGINE 50 UNITS: 100 INJECTION, SOLUTION SUBCUTANEOUS at 08:38

## 2023-12-25 RX ADMIN — DULOXETINE HYDROCHLORIDE 60 MG: 60 CAPSULE, DELAYED RELEASE ORAL at 21:33

## 2023-12-25 RX ADMIN — FOLIC ACID 1 MG: 1 TABLET ORAL at 08:37

## 2023-12-25 RX ADMIN — FAMOTIDINE 40 MG: 20 TABLET ORAL at 06:17

## 2023-12-25 RX ADMIN — GABAPENTIN 300 MG: 300 CAPSULE ORAL at 08:37

## 2023-12-25 RX ADMIN — MONTELUKAST SODIUM 10 MG: 10 TABLET, FILM COATED ORAL at 21:35

## 2023-12-25 RX ADMIN — MUPIROCIN 1 APPLICATION: 20 OINTMENT TOPICAL at 21:34

## 2023-12-25 NOTE — PROGRESS NOTES
[facility] Progress Note      Patient Name: Preston Wallis  : 1965  MRN: [id number]  Primary Care Physician:  Kimmy Riley MD  Date of admission: 2023    Subjective     Interval History:     Overnight event  Patient lying in bed comfortable on supplemental oxygen  Minimal discomfort on surgical wound  Tolerating oral intake  Doyle catheter in place with 2.6 L of urine output    No fevers or chills    Accompanied by wife and daughter at bedside    Review of Systems:   As noted above    Objective     Vitals:   Temp:  [97.7 °F (36.5 °C)-98.3 °F (36.8 °C)] 98.3 °F (36.8 °C)  Heart Rate:  [78-84] 84  Resp:  [16] 16  BP: (161-173)/(74-82) 173/82  Flow (L/min):  [2] 2    Intake/Output Summary (Last 24 hours) at 2023 1411  Last data filed at 2023 1340  Gross per 24 hour   Intake 840 ml   Output 2600 ml   Net -1760 ml       Physical Exam:      Constitutional: Awake, alert, no acute distress.  Morbid obese BMI 41.  Currently on nasal cannula  HEENT: Sclera anicteric, no conjunctival injection  Neck: Supple, no thyromegaly, no lymphadenopathy, trachea at midline, no JVD  Respiratory: Diffuse crackles bibasilar  Cardiovascular: RRR, systolic extra murmur  Gastrointestinal: Positive bowel sounds, abdomen is soft, nontender and nondistended  :   Doyle catheter in place  Musculoskeletal: Bilateral edema, no clubbing or cyanosis.  Surgical wound on left tibial area  Psychiatric: Appropriate affect, cooperative  Neurologic: Oriented x3, moving all extremities, normal speech and mental status  Skin: Warm and dry        Scheduled Meds:     [Held by provider] apixaban, 5 mg, Oral, Q12H  apixaban, 5 mg, Oral, Q12H  atorvastatin, 20 mg, Oral, Nightly  budesonide-formoterol, 2 puff, Inhalation, BID - RT  calcium carbonate (oyster shell), 500 mg, Oral, Daily  castor oil-balsam peru, 1 application , Topical, Q12H  cholecalciferol, 2,000 Units, Oral, Daily  DULoxetine, 60 mg,  Oral, BID  famotidine, 40 mg, Oral, QAM  ferrous sulfate, 325 mg, Oral, BID With Meals  finasteride, 5 mg, Oral, Daily  folic acid, 1 mg, Oral, Daily  gabapentin, 300 mg, Oral, Q12H  hydrocortisone-bacitracin-zinc oxide-nystatin, 1 application , Topical, Q12H  insulin glargine, 50 Units, Subcutaneous, Daily  insulin lispro, 2-7 Units, Subcutaneous, 4x Daily AC & at Bedtime  insulin lispro, 8 Units, Subcutaneous, TID AC  magnesium oxide, 400 mg, Oral, Daily  metoprolol tartrate, 100 mg, Oral, BID  montelukast, 10 mg, Oral, Nightly  mupirocin, 1 application , Topical, Q12H  NIFEdipine XL, 30 mg, Oral, QAM  O2, 2 L/min, Inhalation, Once  senna-docusate sodium, 2 tablet, Oral, BID  sodium chloride, 10 mL, Intravenous, Q12H      IV Meds:        Results Reviewed:   I have personally reviewed the results from the time of this admission to 12/25/2023 14:11 EST     Results from last 7 days   Lab Units 12/24/23  0616 12/23/23  0620 12/22/23  0431   SODIUM mmol/L 139 137 136   POTASSIUM mmol/L 4.3 4.7 4.5   CHLORIDE mmol/L 101 98 99   CO2 mmol/L 29.4* 29.9* 30.0*   BUN mg/dL 28* 26* 26*   CREATININE mg/dL 1.77* 1.43* 1.52*   CALCIUM mg/dL 8.9 9.1 9.0   BILIRUBIN mg/dL 0.3 0.2 0.3   ALK PHOS U/L 155* 168* 149*   ALT (SGPT) U/L <5 12 16   AST (SGOT) U/L 16 18 15   GLUCOSE mg/dL 102* 191* 110*       Estimated Creatinine Clearance: 60 mL/min (A) (by C-G formula based on SCr of 1.77 mg/dL (H)).                Results from last 7 days   Lab Units 12/24/23  0616 12/23/23  0440 12/22/23  0431 12/21/23  0538 12/20/23  0516   WBC 10*3/mm3 10.24 9.25 10.28 10.22 10.52   HEMOGLOBIN g/dL 9.3* 10.0* 7.6* 8.1* 7.9*   PLATELETS 10*3/mm3 439 448 477* 430 375             Assessment / Plan     ASSESSMENT:    Chronic kidney disease stage IIIb.  Baseline creatinine 1.6-1.8 follow-up closely by Dr. Martínez , ( at [place], [place] )  secondary to diabetic nephropathy and subnephrotic proteinuria of 2.8 g associated with neurogenic bladder with chronic  self bladder catheterization.  .  Currently renal function is at baseline     Neurogenic bladder with a chronic bladder catheterization currently Doyle catheter in place.  Catheter care by nursing staff     Proximal left tibia and fibula fractures on the left side. S/p  surgical repair on 12/22/2023 by orthopedics followed closely.      Diabetes Mellitus type 2 w/ complications ( , gastroparesis , neuropathy , nephropathy ) .Continue insulin regimen / hypoglycemic regimen .Hypoglycemic protocol . POC glucose 4 x day .Diabetic diet     Paroxysmal atrial fibrillation anticoagulated on apixaban and rate control metoprolol      Hypertension with CKD/currently on nifedipine and  metoprolol.  And heart healthy diet    PLAN:  Currently renal function at baseline electrolytes  and volume status stable  Will continue to follow closely with surveillance labs  Upon discharge patient can be followed closely as an outpatient       Thank you for involving us in the care of Preston Wallis.  Please feel free to call with any questions.    Jose Bansal MD  12/25/23  14:11 [facility]    [facility]  [phone]    Please note that portions of this note were completed with a voice recognition program.

## 2023-12-25 NOTE — PLAN OF CARE
Goal Outcome Evaluation:              Outcome Evaluation: POD 3 L tibial plateau ORIF, A&O, VSS, 2L, optifoam dressing, dressings completed on all wounds, chronic f/c, small BM this shift, chest port accessed, nina lift for transfers, scds in place, SL, reg c/c diet, accucheck w/ssi, very minimal c/o pain, educated on glucose monitoring, CTM

## 2023-12-25 NOTE — PLAN OF CARE
Goal Outcome Evaluation:  Plan of Care Reviewed With: patient           Outcome Evaluation: VSS. Pt cont voiding per lopez cath and care done. C/o minimal pain as of the moment. Dressings c/d/i. Wound care done and tolerated well. CHG bath done. Educated on central cath and lopez cath infection prevention, falls precaution, blood glucose and b/p monitoring. Plan to d/c to a SNF. Will take note of any changes.

## 2023-12-25 NOTE — PROGRESS NOTES
Name: Preston Wallis ADMIT: 2023   : 1965  PCP: Kimmy Riley MD    MRN: [id number] LOS: 4 days   AGE/SEX: 58 y.o. male  ROOM: [facility]     Subjective   Subjective   Patient seen at bedside.       Objective   Objective   Vital Signs  Temp:  [97.7 °F (36.5 °C)-98.3 °F (36.8 °C)] 98.1 °F (36.7 °C)  Heart Rate:  [78-84] 84  Resp:  [16] 16  BP: (148-173)/(74-82) 148/77  SpO2:  [92 %-98 %] 96 %  on  Flow (L/min):  [2] 2;   Device (Oxygen Therapy): nasal cannula  Body mass index is 41.35 kg/m².  Physical Exam  General, awake and alert.  Head and ENT, normocephalic and atraumatic.  Lungs, symmetric expansion, equal air entry bilaterally.  Heart, regular rate and rhythm.  Abdomen, soft and nontender.  Extremities, no clubbing or cyanosis.  Neuro, no focal deficits.  Skin: Warm and no rash.  Psych, normal mood and affect.  Musculoskeletal, joint examination is grossly normal.             Copied text material from yesterday's note has been reviewed for appropriate changes and remains accurate as of 23.      Results Review     I reviewed the patient's new clinical results.  Results from last 7 days   Lab Units 23  0616 23  0440 23  0431 23  0538   WBC 10*3/mm3 10.24 9.25 10.28 10.22   HEMOGLOBIN g/dL 9.3* 10.0* 7.6* 8.1*   PLATELETS 10*3/mm3 439 448 477* 430     Results from last 7 days   Lab Units 23  0616 23  0620 23  0431 23  0538   SODIUM mmol/L 139 137 136 136   POTASSIUM mmol/L 4.3 4.7 4.5 4.8   CHLORIDE mmol/L 101 98 99 99   CO2 mmol/L 29.4* 29.9* 30.0* 28.5   BUN mg/dL 28* 26* 26* 31*   CREATININE mg/dL 1.77* 1.43* 1.52* 1.74*   GLUCOSE mg/dL 102* 191* 110* 170*   EGFR mL/min/1.73 44.0* 56.8* 52.8* 44.9*     Results from last 7 days   Lab Units 23  0616 23  0620 23  0431 23  0538   ALBUMIN g/dL 2.9* 3.1* 2.7* 3.0*   BILIRUBIN mg/dL 0.3 0.2 0.3 0.2   ALK PHOS U/L 155* 168* 149* 145*   AST (SGOT) U/L 16 18 15 16   ALT  (SGPT) U/L <5 12 16 11     Results from last 7 days   Lab Units 12/24/23  0616 12/23/23  0620 12/22/23  0431 12/21/23  0538   CALCIUM mg/dL 8.9 9.1 9.0 9.1   ALBUMIN g/dL 2.9* 3.1* 2.7* 3.0*       Glucose   Date/Time Value Ref Range Status   12/25/2023 1554 87 70 - 130 mg/dL Final   12/25/2023 1057 118 70 - 130 mg/dL Final   12/25/2023 0704 96 70 - 130 mg/dL Final   12/24/2023 2042 117 70 - 130 mg/dL Final   12/24/2023 1620 117 70 - 130 mg/dL Final   12/24/2023 1122 131 (H) 70 - 130 mg/dL Final   12/24/2023 0727 97 70 - 130 mg/dL Final       No radiology results for the last day    I have personally reviewed all medications:  Scheduled Medications  [Held by provider] apixaban, 5 mg, Oral, Q12H  apixaban, 5 mg, Oral, Q12H  atorvastatin, 20 mg, Oral, Nightly  budesonide-formoterol, 2 puff, Inhalation, BID - RT  calcium carbonate (oyster shell), 500 mg, Oral, Daily  castor oil-balsam peru, 1 application , Topical, Q12H  cholecalciferol, 2,000 Units, Oral, Daily  DULoxetine, 60 mg, Oral, BID  famotidine, 40 mg, Oral, QAM  ferrous sulfate, 325 mg, Oral, BID With Meals  finasteride, 5 mg, Oral, Daily  folic acid, 1 mg, Oral, Daily  gabapentin, 300 mg, Oral, Q12H  hydrocortisone-bacitracin-zinc oxide-nystatin, 1 application , Topical, Q12H  insulin glargine, 50 Units, Subcutaneous, Daily  insulin lispro, 2-7 Units, Subcutaneous, 4x Daily AC & at Bedtime  insulin lispro, 8 Units, Subcutaneous, TID AC  magnesium oxide, 400 mg, Oral, Daily  metoprolol tartrate, 100 mg, Oral, BID  montelukast, 10 mg, Oral, Nightly  mupirocin, 1 application , Topical, Q12H  NIFEdipine XL, 30 mg, Oral, QAM  O2, 2 L/min, Inhalation, Once  senna-docusate sodium, 2 tablet, Oral, BID  sodium chloride, 10 mL, Intravenous, Q12H    Infusions   Diet  Diet: Regular/House Diet; Texture: Regular Texture (IDDSI 7); Fluid Consistency: Thin (IDDSI 0)    I have personally reviewed:  [x]  Laboratory   [x]  Microbiology   [x]  Radiology   [x]  EKG/Telemetry  [x]   Cardiology/Vascular   []  Pathology    []  Records       Assessment/Plan     Active Hospital Problems    Diagnosis  POA    **Closed fracture of left tibial plateau [S82.142A]  Yes    Essential hypertension [I10]  Yes    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       58 y.o. male admitted with Closed fracture of left tibial plateau.    Assessment and plan  1.  Closed fracture of left tibia plateau, management per Ortho.  Continue pain control and physical therapy.     2.  Hypertension, diabetes, COPD, chronic conditions.     3. Paroxysmal atrial fibrillation, patient is currently rate controlled.     4.  Morbid obesity, complicates aspects of care.     5.  Acute chronic kidney injury, nephrology on board.      6.  CODE STATUS is full code.  Further plans based on hospital course.      Shabbir Peraza MD  [facility]  12/25/23  18:14 EST

## 2023-12-26 LAB
ALBUMIN SERPL-MCNC: 3 G/DL (ref 3.5–5.2)
ANION GAP SERPL CALCULATED.3IONS-SCNC: 9.8 MMOL/L (ref 5–15)
BASOPHILS # BLD AUTO: 0.08 10*3/MM3 (ref 0–0.2)
BASOPHILS NFR BLD AUTO: 0.8 % (ref 0–1.5)
BUN SERPL-MCNC: 29 MG/DL (ref 6–20)
BUN/CREAT SERPL: 19.6 (ref 7–25)
CALCIUM SPEC-SCNC: 9 MG/DL (ref 8.6–10.5)
CHLORIDE SERPL-SCNC: 102 MMOL/L (ref 98–107)
CO2 SERPL-SCNC: 29.2 MMOL/L (ref 22–29)
CREAT SERPL-MCNC: 1.48 MG/DL (ref 0.76–1.27)
DEPRECATED RDW RBC AUTO: 44.1 FL (ref 37–54)
EGFRCR SERPLBLD CKD-EPI 2021: 54.5 ML/MIN/1.73
EOSINOPHIL # BLD AUTO: 1.08 10*3/MM3 (ref 0–0.4)
EOSINOPHIL NFR BLD AUTO: 11 % (ref 0.3–6.2)
ERYTHROCYTE [DISTWIDTH] IN BLOOD BY AUTOMATED COUNT: 13.5 % (ref 12.3–15.4)
GLUCOSE BLDC GLUCOMTR-MCNC: 154 MG/DL (ref 70–130)
GLUCOSE BLDC GLUCOMTR-MCNC: 182 MG/DL (ref 70–130)
GLUCOSE BLDC GLUCOMTR-MCNC: 223 MG/DL (ref 70–130)
GLUCOSE BLDC GLUCOMTR-MCNC: 87 MG/DL (ref 70–130)
GLUCOSE SERPL-MCNC: 81 MG/DL (ref 65–99)
HCT VFR BLD AUTO: 30.5 % (ref 37.5–51)
HGB BLD-MCNC: 9.7 G/DL (ref 13–17.7)
IMM GRANULOCYTES # BLD AUTO: 0.06 10*3/MM3 (ref 0–0.05)
IMM GRANULOCYTES NFR BLD AUTO: 0.6 % (ref 0–0.5)
LYMPHOCYTES # BLD AUTO: 2.47 10*3/MM3 (ref 0.7–3.1)
LYMPHOCYTES NFR BLD AUTO: 25.1 % (ref 19.6–45.3)
MCH RBC QN AUTO: 28.4 PG (ref 26.6–33)
MCHC RBC AUTO-ENTMCNC: 31.8 G/DL (ref 31.5–35.7)
MCV RBC AUTO: 89.4 FL (ref 79–97)
MONOCYTES # BLD AUTO: 1.58 10*3/MM3 (ref 0.1–0.9)
MONOCYTES NFR BLD AUTO: 16 % (ref 5–12)
NEUTROPHILS NFR BLD AUTO: 4.59 10*3/MM3 (ref 1.7–7)
NEUTROPHILS NFR BLD AUTO: 46.5 % (ref 42.7–76)
NRBC BLD AUTO-RTO: 0 /100 WBC (ref 0–0.2)
PHOSPHATE SERPL-MCNC: 2.7 MG/DL (ref 2.5–4.5)
PLATELET # BLD AUTO: 498 10*3/MM3 (ref 140–450)
PMV BLD AUTO: 8.8 FL (ref 6–12)
POTASSIUM SERPL-SCNC: 4.3 MMOL/L (ref 3.5–5.2)
RBC # BLD AUTO: 3.41 10*6/MM3 (ref 4.14–5.8)
SODIUM SERPL-SCNC: 141 MMOL/L (ref 136–145)
WBC NRBC COR # BLD AUTO: 9.86 10*3/MM3 (ref 3.4–10.8)

## 2023-12-26 PROCEDURE — 82948 REAGENT STRIP/BLOOD GLUCOSE: CPT

## 2023-12-26 PROCEDURE — 63710000001 INSULIN LISPRO (HUMAN) PER 5 UNITS: Performed by: ORTHOPAEDIC SURGERY

## 2023-12-26 PROCEDURE — 80069 RENAL FUNCTION PANEL: CPT | Performed by: INTERNAL MEDICINE

## 2023-12-26 PROCEDURE — 94799 UNLISTED PULMONARY SVC/PX: CPT

## 2023-12-26 PROCEDURE — 63710000001 INSULIN GLARGINE PER 5 UNITS: Performed by: ORTHOPAEDIC SURGERY

## 2023-12-26 PROCEDURE — 94664 DEMO&/EVAL PT USE INHALER: CPT

## 2023-12-26 PROCEDURE — 97110 THERAPEUTIC EXERCISES: CPT

## 2023-12-26 PROCEDURE — 94760 N-INVAS EAR/PLS OXIMETRY 1: CPT

## 2023-12-26 PROCEDURE — 94761 N-INVAS EAR/PLS OXIMETRY MLT: CPT

## 2023-12-26 PROCEDURE — 85025 COMPLETE CBC W/AUTO DIFF WBC: CPT | Performed by: INTERNAL MEDICINE

## 2023-12-26 RX ORDER — LOSARTAN POTASSIUM 25 MG/1
25 TABLET ORAL
Status: DISCONTINUED | OUTPATIENT
Start: 2023-12-26 | End: 2023-12-27

## 2023-12-26 RX ORDER — CARVEDILOL 25 MG/1
25 TABLET ORAL EVERY 12 HOURS SCHEDULED
Status: DISCONTINUED | OUTPATIENT
Start: 2023-12-26 | End: 2023-12-28 | Stop reason: HOSPADM

## 2023-12-26 RX ADMIN — ATORVASTATIN CALCIUM 20 MG: 20 TABLET, FILM COATED ORAL at 21:52

## 2023-12-26 RX ADMIN — APIXABAN 5 MG: 5 TABLET, FILM COATED ORAL at 08:40

## 2023-12-26 RX ADMIN — ACETAMINOPHEN 650 MG: 325 TABLET, FILM COATED ORAL at 23:51

## 2023-12-26 RX ADMIN — MAGNESIUM OXIDE 400 MG (241.3 MG MAGNESIUM) TABLET 400 MG: TABLET at 08:40

## 2023-12-26 RX ADMIN — INSULIN GLARGINE 50 UNITS: 100 INJECTION, SOLUTION SUBCUTANEOUS at 08:40

## 2023-12-26 RX ADMIN — METOPROLOL TARTRATE 100 MG: 50 TABLET, FILM COATED ORAL at 08:40

## 2023-12-26 RX ADMIN — Medication 500 MG: at 08:40

## 2023-12-26 RX ADMIN — Medication 2000 UNITS: at 08:40

## 2023-12-26 RX ADMIN — FERROUS SULFATE TAB 325 MG (65 MG ELEMENTAL FE) 325 MG: 325 (65 FE) TAB at 08:40

## 2023-12-26 RX ADMIN — DULOXETINE HYDROCHLORIDE 60 MG: 60 CAPSULE, DELAYED RELEASE ORAL at 21:55

## 2023-12-26 RX ADMIN — ACETAMINOPHEN 650 MG: 325 TABLET, FILM COATED ORAL at 01:15

## 2023-12-26 RX ADMIN — FERROUS SULFATE TAB 325 MG (65 MG ELEMENTAL FE) 325 MG: 325 (65 FE) TAB at 19:44

## 2023-12-26 RX ADMIN — GABAPENTIN 300 MG: 300 CAPSULE ORAL at 21:52

## 2023-12-26 RX ADMIN — INSULIN LISPRO 8 UNITS: 100 INJECTION, SOLUTION INTRAVENOUS; SUBCUTANEOUS at 11:26

## 2023-12-26 RX ADMIN — BUDESONIDE AND FORMOTEROL FUMARATE DIHYDRATE 2 PUFF: 160; 4.5 AEROSOL RESPIRATORY (INHALATION) at 19:34

## 2023-12-26 RX ADMIN — CASTOR OIL AND BALSAM, PERU 1 APPLICATION: 788; 87 OINTMENT TOPICAL at 19:44

## 2023-12-26 RX ADMIN — MUPIROCIN 1 APPLICATION: 20 OINTMENT TOPICAL at 19:45

## 2023-12-26 RX ADMIN — Medication 10 ML: at 21:55

## 2023-12-26 RX ADMIN — INSULIN LISPRO 2 UNITS: 100 INJECTION, SOLUTION INTRAVENOUS; SUBCUTANEOUS at 11:26

## 2023-12-26 RX ADMIN — NIFEDIPINE 30 MG: 30 TABLET, FILM COATED, EXTENDED RELEASE ORAL at 06:00

## 2023-12-26 RX ADMIN — MUPIROCIN 1 APPLICATION: 20 OINTMENT TOPICAL at 21:52

## 2023-12-26 RX ADMIN — INSULIN LISPRO 4 UNITS: 100 INJECTION, SOLUTION INTRAVENOUS; SUBCUTANEOUS at 21:51

## 2023-12-26 RX ADMIN — ZINC OXIDE 1 APPLICATION: 200 OINTMENT TOPICAL at 19:45

## 2023-12-26 RX ADMIN — DULOXETINE HYDROCHLORIDE 60 MG: 60 CAPSULE, DELAYED RELEASE ORAL at 08:40

## 2023-12-26 RX ADMIN — GABAPENTIN 300 MG: 300 CAPSULE ORAL at 08:40

## 2023-12-26 RX ADMIN — MONTELUKAST SODIUM 10 MG: 10 TABLET, FILM COATED ORAL at 21:55

## 2023-12-26 RX ADMIN — ZINC OXIDE 1 APPLICATION: 200 OINTMENT TOPICAL at 21:52

## 2023-12-26 RX ADMIN — APIXABAN 5 MG: 5 TABLET, FILM COATED ORAL at 21:52

## 2023-12-26 RX ADMIN — FOLIC ACID 1 MG: 1 TABLET ORAL at 08:40

## 2023-12-26 RX ADMIN — SENNOSIDES AND DOCUSATE SODIUM 2 TABLET: 50; 8.6 TABLET ORAL at 21:55

## 2023-12-26 RX ADMIN — FINASTERIDE 5 MG: 5 TABLET, FILM COATED ORAL at 08:40

## 2023-12-26 RX ADMIN — Medication 10 ML: at 08:40

## 2023-12-26 RX ADMIN — BUDESONIDE AND FORMOTEROL FUMARATE DIHYDRATE 2 PUFF: 160; 4.5 AEROSOL RESPIRATORY (INHALATION) at 07:04

## 2023-12-26 RX ADMIN — LOSARTAN POTASSIUM 25 MG: 25 TABLET, FILM COATED ORAL at 14:22

## 2023-12-26 RX ADMIN — CARVEDILOL 25 MG: 25 TABLET, FILM COATED ORAL at 21:52

## 2023-12-26 RX ADMIN — CASTOR OIL AND BALSAM, PERU 1 APPLICATION: 788; 87 OINTMENT TOPICAL at 21:52

## 2023-12-26 RX ADMIN — FAMOTIDINE 40 MG: 20 TABLET ORAL at 06:00

## 2023-12-26 NOTE — THERAPY TREATMENT NOTE
Patient Name: Preston Wallis  : 1965    MRN: [id number]                              Today's Date: 2023       Admit Date: 2023    Visit Dx:     ICD-10-CM ICD-9-CM   1. Closed fracture of medial portion of left tibial plateau, initial encounter  S82.132A 823.00   2. Chronic kidney disease, unspecified CKD stage  N18.9 585.9   3. Chronic obstructive pulmonary disease, unspecified COPD type  J44.9 496   4. Type 2 diabetes mellitus with other specified complication, unspecified whether long term insulin use  E11.69 250.80   5. Immobility  Z74.09 799.89   6. Closed fracture of left tibial plateau, initial encounter  S82.142A 823.00     Patient Active Problem List   Diagnosis    Polyneuropathy    Paroxysmal atrial fibrillation    Obstructive sleep apnea    MRSA pneumonia    Low back pain    Chronic diastolic heart failure    Allergies    COPD exacerbation    Chronic anticoagulation    Benign prostatic hyperplasia    Impaired mobility and endurance    Stage 3a chronic kidney disease    Iron deficiency anemia secondary to inadequate dietary iron intake    Vitamin D deficiency    Class 3 severe obesity with serious comorbidity in adult    Lower extremity edema    Elevated alkaline phosphatase level    Venous insufficiency (chronic) (peripheral)    Tobacco abuse, in remission    History of Pseudomonas pneumonia    Chronic dyspnea    Gastroesophageal reflux disease    Bronchiectasis without complication    ERIC (acute kidney injury)    Altered mental status    Hyperlipidemia    Luetscher's syndrome    Neurogenic bladder    Class 1 obesity    Pneumonia due to Pseudomonas species    Seizures    Primary osteoarthritis of left knee    Other constipation    Chronic obstructive pulmonary disease    Type 2 DM with CKD stage 3 and hypertension    Essential hypertension    Stage 3b chronic kidney disease    Annual physical exam    Long-term use of high-risk medication    Personal history of PE (pulmonary embolism)     Encounter for aftercare for healing closed traumatic fracture of left femur    Chronic pain of left knee    Primary osteoarthritis of right knee    Sepsis    Bronchiectasis    Bacterial pneumonia    Anemia    Closed fracture of left tibial plateau     Past Medical History:   Diagnosis Date    Age-related cognitive decline     Allergic contact dermatitis     Allergies     Anemia     Bronchiectasis with acute lower respiratory infection     Charcot foot due to diabetes mellitus 9/10/2013    Chronic diastolic (congestive) heart failure     Chronic kidney disease     Chronic respiratory failure with hypoxia     Closed supracondylar fracture of femur 1/12/2022    COPD (chronic obstructive pulmonary disease)     Deep vein thrombosis (DVT) of lower extremity associated with air travel 1/13/2023    Dependence on supplemental oxygen     Eczema     Erectile dysfunction     due to organic reasons    Essential (primary) hypertension     Fracture     closed fracture of other tarsal and metatarsal bones    Fracture of proximal humerus 1/13/2023    GERD without esophagitis     High risk medication use     Hypercholesteremia     Hypomagnesemia     Infected stasis ulcer of left lower extremity 1/13/2023    Insomnia     Low back pain     Major depressive disorder     Morbid (severe) obesity due to excess calories     MRSA pneumonia     Muscle weakness     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 without evidence of necrosis     Obstructive sleep apnea (adult) (pediatric)     Other forms of dyspnea     Other long term (current) drug therapy     Other specified noninfective gastroenteritis and colitis     Other spondylosis, lumbar region     Pain in both knees     Paroxysmal atrial fibrillation     Peripheral neuropathy     attributed to type 2 diabetes    Pneumonia, unspecified organism     Polyneuropathy     Rash and other nonspecific skin eruption     Syncope and collapse     Tachycardia      Tinnitus 1/13/2023    Type 1 diabetes mellitus with diabetic chronic kidney disease     Type 2 diabetes mellitus     Unspecified fall, initial encounter     Urinary retention      Past Surgical History:   Procedure Laterality Date    CHOLECYSTECTOMY      CYSTOSCOPY      FEMUR SURGERY Left     Shravan placed    KNEE SURGERY Left     OTHER SURGICAL HISTORY Left     venous port    TIBIAL PLATEAU OPEN REDUCTION INTERNAL FIXATION Left 12/22/2023    Procedure: TIBIAL PLATEAU OPEN REDUCTION INTERNAL FIXATION;  Surgeon: Hugo Kline MD;  Location: [facility];  Service: Orthopedics;  Laterality: Left;    TONSILLECTOMY AND ADENOIDECTOMY        General Information       Row Name 12/26/23 1036          Physical Therapy Time and Intention    Document Type therapy note (daily note)  -MS     Mode of Treatment physical therapy  -MS       Row Name 12/26/23 1036          General Information    Existing Precautions/Restrictions fall;non-weight bearing  -MS       Row Name 12/26/23 1036          Cognition    Orientation Status (Cognition) oriented x 3  -MS       Row Name 12/26/23 1036          Safety Issues, Functional Mobility    Safety Issues Affecting Function (Mobility) safety precautions follow-through/compliance;insight into deficits/self-awareness;judgment;positioning of assistive device  -MS     Impairments Affecting Function (Mobility) strength;pain;postural/trunk control;range of motion (ROM)  -MS     Comment, Safety Issues/Impairments (Mobility) Gait belt and non skid socks donned.  -MS               User Key  (r) = Recorded By, (t) = Taken By, (c) = Cosigned By      Initials Name Provider Type    MS Xiao Loyd, PT Physical Therapist                   Mobility       Row Name 12/26/23 1036          Bed Mobility    Bed Mobility supine-sit  -MS     Supine-Sit [place] (Bed Mobility) standby assist;verbal cues;nonverbal cues (demo/gesture)  -MS     Assistive Device (Bed Mobility) bed rails;draw sheet;head  of bed elevated  -MS     Comment, (Bed Mobility) SV for sitting balance.  -MS       Row Name 12/26/23 1036          Transfers    Comment, (Transfers) Strong difficulty maintaining NWBing status of L LE. Deferred further trials to ensure safety.  -MS       Row Name 12/26/23 1036          Bed-Chair Transfer    Bed-Chair [place] (Transfers) moderate assist (50% patient effort);maximum assist (25% patient effort);verbal cues;nonverbal cues (demo/gesture);2 person assist  -MS     Assistive Device (Bed-Chair Transfers) walker, front-wheeled  -MS     Comment, (Bed-Chair Transfer) Max sequencing and hand placement cues. Pivot to the R.  -MS       Row Name 12/26/23 1036          Sit-Stand Transfer    Sit-Stand [place] (Transfers) moderate assist (50% patient effort);verbal cues;nonverbal cues (demo/gesture)  -MS     Assistive Device (Sit-Stand Transfers) walker, front-wheeled  -MS     Comment, (Sit-Stand Transfer) one STS- able to complete 15s NWBing  -MS       Row Name 12/26/23 1036          Gait/Stairs (Locomotion)    Comment, (Gait/Stairs) Deferred this date.  -MS       Row Name 12/26/23 1036          Mobility    Extremity Weight-bearing Status left lower extremity  -MS     Left Lower Extremity (Weight-bearing Status) non weight-bearing (NWB)  -MS               User Key  (r) = Recorded By, (t) = Taken By, (c) = Cosigned By      Initials Name Provider Type    Xiao Noble, PT Physical Therapist                   Obj/Interventions       Row Name 12/26/23 1038          Balance    Balance Assessment sitting static balance;standing static balance  -MS     Static Sitting Balance supervision  -MS     Dynamic Sitting Balance supervision  -MS     Position, Sitting Balance sitting edge of bed  -MS     Static Standing Balance contact guard  -MS     Dynamic Standing Balance contact guard  -MS     Position/Device Used, Standing Balance supported;walker, rolling  -MS               User Key  (r) = Recorded By, (t) =  Taken By, (c) = Cosigned By      Initials Name Provider Type    MS LoydXiao, PT Physical Therapist                   Goals/Plan       Row Name 12/26/23 1042          Bed Mobility Goal 1 (PT)    Activity/Assistive Device (Bed Mobility Goal 1, PT) bed mobility activities, all  -MS     [place] Level/Cues Needed (Bed Mobility Goal 1, PT) supervision required  -MS     Time Frame (Bed Mobility Goal 1, PT) 1 week  -MS     Progress/Outcomes (Bed Mobility Goal 1, PT) goal revised this date  -MS       Row Name 12/26/23 1042          Transfer Goal 1 (PT)    Activity/Assistive Device (Transfer Goal 1, PT) transfers, all;walker, rolling  maintaining NWBing status  -MS     [place] Level/Cues Needed (Transfer Goal 1, PT) contact guard required  -MS     Time Frame (Transfer Goal 1, PT) 1 week  -MS     Progress/Outcome (Transfer Goal 1, PT) goal revised this date  -MS       Row Name 12/26/23 1042          Gait Training Goal 1 (PT)    Activity/Assistive Device (Gait Training Goal 1, PT) gait (walking locomotion);walker, rolling  maintaining NWBing status  -MS     [place] Level (Gait Training Goal 1, PT) minimum assist (75% or more patient effort)  -MS     Distance (Gait Training Goal 1, PT) 25'  -MS     Time Frame (Gait Training Goal 1, PT) 1 week  -MS       Row Name 12/26/23 1042          Therapy Assessment/Plan (PT)    Planned Therapy Interventions (PT) balance training;bed mobility training;gait training;home exercise program;postural re-education;patient/family education;ROM (range of motion);stair training;strengthening;transfer training  -MS               User Key  (r) = Recorded By, (t) = Taken By, (c) = Cosigned By      Initials Name Provider Type    MS LoydXiao, PT Physical Therapist                   Clinical Impression       Row Name 12/26/23 1039          Pain    Pretreatment Pain Rating 5/10  -MS     Posttreatment Pain Rating 5/10  -MS     Pain Location - Side/Orientation Left  -MS      Pain Location lower  -MS     Pain Location - extremity  -MS     Pain Intervention(s) Repositioned;Ambulation/increased activity;Rest  -MS       Row Name 12/26/23 1039          Plan of Care Review    Plan of Care Reviewed With patient  -MS     Progress improving  -MS     Outcome Evaluation Patient pleasant and agreeable to PT this morning. Reviewed NWBing status of L LE. SBA for bed mobility and completed bed<>chair transfer requiring mod-maxAx2 and use of RW for UE support. Second STS completed in front of recliner and able to maintain NWBing status for 15s with use of RW for UE support. PT will continue to follow and advance as able. Continue to recommend SNF upon DC.  -MS       Row Name 12/26/23 1039          Vital Signs    O2 Delivery Pre Treatment supplemental O2  -MS     O2 Delivery Intra Treatment supplemental O2  -MS     O2 Delivery Post Treatment supplemental O2  -MS       Row Name 12/26/23 1039          Positioning and Restraints    Pre-Treatment Position in bed  -MS     Post Treatment Position chair  -MS     In Chair notified nsg;reclined;call light within reach;encouraged to call for assist;exit alarm on  -MS               User Key  (r) = Recorded By, (t) = Taken By, (c) = Cosigned By      Initials Name Provider Type    MS RachXiao, PT Physical Therapist                   Outcome Measures       Row Name 12/26/23 1042 12/26/23 0840       How much help from another person do you currently need...    Turning from your back to your side while in flat bed without using bedrails? 3  -MS 3  -MN    Moving from lying on back to sitting on the side of a flat bed without bedrails? 2  -MS 3  -MN    Moving to and from a bed to a chair (including a wheelchair)? 2  -MS 2  -MN    Standing up from a chair using your arms (e.g., wheelchair, bedside chair)? 2  -MS 2  -MN    Climbing 3-5 steps with a railing? 1  -MS 1  -MN    To walk in hospital room? 1  -MS 2  -MN    AM-PAC 6 Clicks Score (PT) 11  -MS 13  -MN     Highest Level of Mobility Goal 4 --> Transfer to chair/commode  -MS 4 --> Transfer to chair/commode  -MN              User Key  (r) = Recorded By, (t) = Taken By, (c) = Cosigned By      Initials Name Provider Type    Yolanda Mathew, RN Registered Nurse    MS Xiao Loyd, PT Physical Therapist                                 Physical Therapy Education       Title: PT OT SLP Therapies (Done)       Topic: Physical Therapy (Done)       Point: Mobility training (Done)       Learning Progress Summary             Patient Acceptance, E,TB, VU by MS at 12/26/2023 1043                         Point: Home exercise program (Done)       Learning Progress Summary             Patient Acceptance, E,TB, VU by MS at 12/26/2023 1043                         Point: Body mechanics (Done)       Learning Progress Summary             Patient Acceptance, E,TB, VU by MS at 12/26/2023 1043                         Point: Precautions (Done)       Learning Progress Summary             Patient Acceptance, E,TB, VU by MS at 12/26/2023 1043                                         User Key       Initials Effective Dates Name Provider Type Discipline    MS 06/16/21 -  Xiao Loyd PT Physical Therapist PT                  PT Recommendation and Plan  Planned Therapy Interventions (PT): balance training, bed mobility training, gait training, home exercise program, postural re-education, patient/family education, ROM (range of motion), stair training, strengthening, transfer training  Plan of Care Reviewed With: patient  Progress: improving  Outcome Evaluation: Patient pleasant and agreeable to PT this morning. Reviewed NWBing status of L LE. SBA for bed mobility and completed bed<>chair transfer requiring mod-maxAx2 and use of RW for UE support. Second STS completed in front of recliner and able to maintain NWBing status for 15s with use of RW for UE support. PT will continue to follow and advance as able. Continue to recommend SNF  upon DC.     Time Calculation:         PT Charges       Row Name 12/26/23 1035             Time Calculation    Start Time 0852  -MS      Stop Time 0906  -MS      Time Calculation (min) 14 min  -MS      PT Received On 12/26/23  -MS      PT - Next Appointment 12/27/23  -MS      PT Goal Re-Cert Due Date 01/02/24  -MS                User Key  (r) = Recorded By, (t) = Taken By, (c) = Cosigned By      Initials Name Provider Type    Xiao Noble, PT Physical Therapist                  Therapy Charges for Today       Code Description Service Date Service Provider Modifiers Qty    23434956343 HC PT THER PROC EA 15 MIN 12/26/2023 Xiao Loyd, PT GP 1    41376838069 HC PT THER SUPP EA 15 MIN 12/26/2023 Xiao Loyd, PT GP 1            PT G-Codes  Outcome Measure Options: AM-PAC 6 Clicks Basic Mobility (PT)  AM-PAC 6 Clicks Score (PT): 11  PT Discharge Summary  Anticipated Discharge Disposition (PT): skilled nursing facility    Xiao Loyd PT  12/26/2023

## 2023-12-26 NOTE — CASE MANAGEMENT/SOCIAL WORK
Continued Stay Note  [facility]     Patient Name: Preston Wallis  MRN: [id number]  Today's Date: 12/26/2023    Admit Date: 12/14/2023    Plan: [facility] -- Accepted.   Discharge Plan       Row Name 12/26/23 1559       Plan    Plan [facility] -- Accepted.    Patient/Family in Agreement with Plan yes    Plan Comments Spoke with Moe/Huang who has accepted the patient and plans to have a SNF bed for him tomorrow at [facility]. Moe said the patient does not need pre-cert.                   Discharge Codes    No documentation.                 Expected Discharge Date and Time       Expected Discharge Date Expected Discharge Time    Dec 28, 2023               Brianne LUIS RN

## 2023-12-26 NOTE — PLAN OF CARE
Goal Outcome Evaluation:  Plan of Care Reviewed With: patient        Progress: no change  Outcome Evaluation: patient repositioning self in bed, tylenol given for pain, f/c to remain in place for chronic urinary retention and unable to self cath at this time, BM 12/25/23, chest port accessed with good blood return, plans to d/c to rehab, educated on hygiene to prevent CAUTI and CLABSI and b/p and glucose monitoring

## 2023-12-26 NOTE — CASE MANAGEMENT/SOCIAL WORK
Continued Stay Note  [facility]     Patient Name: Preston Wallis  MRN: [id number]  Today's Date: 12/26/2023    Admit Date: 12/14/2023    Plan: SNF -- Referral Pending. SHANNAN LEWIS.   Discharge Plan       Row Name 12/26/23 1025       Plan    Plan SNF -- Referral Pending. SHANNAN LEWIS.    Patient/Family in Agreement with Plan yes    Provided Post Acute Provider List? Yes    Post Acute Provider List Nursing Home  SNF.    Plan Comments Spoke with the patient who said he plans to d/c to SNF. He requests [facility]. Referral sent in Epic. Called and left a message for Moe[facility]. CCP to follow.                   Discharge Codes    No documentation.                 Expected Discharge Date and Time       Expected Discharge Date Expected Discharge Time    Dec 26, 2023               Brianne LUIS RN

## 2023-12-26 NOTE — PROGRESS NOTES
[facility] Progress Note      Patient Name: Preston Wallis  : 1965  MRN: [id number]  Primary Care Physician:  Kimmy Riley MD  Date of admission: 2023    Subjective     Interval History:   F/u CKD3    Review of Systems:   BP high 160s to 180s systolic  Denies dyspnea or nausea     Objective     Vitals:   Temp:  [97.8 °F (36.6 °C)-98.7 °F (37.1 °C)] 98.4 °F (36.9 °C)  Heart Rate:  [73-86] 78  Resp:  [16-20] 16  BP: (148-188)/(77-94) 188/94  Flow (L/min):  [2] 2    Intake/Output Summary (Last 24 hours) at 2023 1116  Last data filed at 2023 0723  Gross per 24 hour   Intake 1200 ml   Output 3950 ml   Net -2750 ml       Physical Exam:    General Appearance: alert, comfortable   Neck: supple, no JVD  Lungs: CTA bilat no rales   Heart: RRR, normal S1 and S2  Abdomen: soft, nontender, nondistended  : +lopez  Extremities: trace BLE pedal/ankle edema    Scheduled Meds:     apixaban, 5 mg, Oral, Q12H  atorvastatin, 20 mg, Oral, Nightly  budesonide-formoterol, 2 puff, Inhalation, BID - RT  calcium carbonate (oyster shell), 500 mg, Oral, Daily  castor oil-balsam peru, 1 application , Topical, Q12H  cholecalciferol, 2,000 Units, Oral, Daily  DULoxetine, 60 mg, Oral, BID  famotidine, 40 mg, Oral, QAM  ferrous sulfate, 325 mg, Oral, BID With Meals  finasteride, 5 mg, Oral, Daily  folic acid, 1 mg, Oral, Daily  gabapentin, 300 mg, Oral, Q12H  hydrocortisone-bacitracin-zinc oxide-nystatin, 1 application , Topical, Q12H  insulin glargine, 50 Units, Subcutaneous, Daily  insulin lispro, 2-7 Units, Subcutaneous, 4x Daily AC & at Bedtime  insulin lispro, 8 Units, Subcutaneous, TID AC  magnesium oxide, 400 mg, Oral, Daily  metoprolol tartrate, 100 mg, Oral, BID  montelukast, 10 mg, Oral, Nightly  mupirocin, 1 application , Topical, Q12H  NIFEdipine XL, 30 mg, Oral, QAM  O2, 2 L/min, Inhalation, Once  senna-docusate sodium, 2 tablet, Oral, BID  sodium chloride, 10 mL,  Intravenous, Q12H      IV Meds:        Results Reviewed:   I have personally reviewed the results from the time of this admission to 12/26/2023 11:16 EST     Results from last 7 days   Lab Units 12/26/23  0541 12/24/23  0616 12/23/23  0620 12/22/23  0431   SODIUM mmol/L 141 139 137 136   POTASSIUM mmol/L 4.3 4.3 4.7 4.5   CHLORIDE mmol/L 102 101 98 99   CO2 mmol/L 29.2* 29.4* 29.9* 30.0*   BUN mg/dL 29* 28* 26* 26*   CREATININE mg/dL 1.48* 1.77* 1.43* 1.52*   CALCIUM mg/dL 9.0 8.9 9.1 9.0   BILIRUBIN mg/dL  --  0.3 0.2 0.3   ALK PHOS U/L  --  155* 168* 149*   ALT (SGPT) U/L  --  <5 12 16   AST (SGOT) U/L  --  16 18 15   GLUCOSE mg/dL 81 102* 191* 110*     Estimated Creatinine Clearance: 71.7 mL/min (A) (by C-G formula based on SCr of 1.48 mg/dL (H)).  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  Also obs uropathy related to neurogenic bladder.  Dr Martínez follows.  Cr improved 1.4, BL mid 1's.  Lytes stable.  Also on farxiga, on hold currently  HTN - uncontrolled, on max metop 100 BID and low dose nifedipine ER 30mg but hx edema makes me reluctant to titrate CCB; looks like he was on on ARB (losartan 25mg) at office visit June 2023, not on admission med list however  Neurogenic bladder, self caths at home, currently with loepz here   Proximal left tibia and fibula fractures on the left side. S/p surgical repair on 12/22/2023 by orthopedics followed closely.   DM2 w/ complications ( , gastroparesis , neuropathy , nephropathy ); mgmt per [facility]  Edema - mild, but need to clarify if still on diuretic at home (was on buemx 2mg BID as  of June 2023, while no loop diuretics listed on admission).  PAF on AC (eliquis)    PLAN:  Change metop to coreg 25 BID  Resume ARB resume  Resume farxiga at discharge  Clarify if on bumex still at home   Rehab placement in progress      Romeo Onofre MD  12/26/23  11:16 [facility]    [facility]  [phone]

## 2023-12-26 NOTE — PAYOR COMM NOTE
"Preston Wallis \"Gómez\" (58 y.o. Male)      CONTINUED STAY CLINICALS: REF#  [id number]      DEPT: [phone],  [phone]    [facility]: NPI [id number] [facility]# [id number]    LEISA REIS RN,[facility]       Date of Birth   1965    Social Security Number       Address   [address] [place] [address]    Home Phone       MRN   [id number]       Alevism   Religious    Marital Status                               Admission Date   12/14/23    Admission Type   Urgent    Admitting Provider   Yohana Anaya MD    Attending Provider   Shabbir Peraza MD    Department, Room/Bed   [address], P793/1       Discharge Date       Discharge Disposition       Discharge Destination                                 Attending Provider: Shabbir Peraza MD    Allergies: Benadryl [Diphenhydramine], Proventil [Albuterol]    Isolation: None   Infection: MRSA/History Only (12/15/23)   Code Status: CPR    Ht: 175.3 cm (69\")   Wt: 127 kg (280 lb)    Admission Cmt: None   Principal Problem: Closed fracture of left tibial plateau [S82.142A]                   Active Insurance as of 12/14/2023       Primary Coverage       Payor Plan Insurance Group Employer/Plan Group    [facility] BY YING [facility] BY YING [id number]       Payor Plan Address Payor Plan Phone Number Payor Plan Fax Number Effective Dates    [address] [address]   7/1/2022 - None Entered    [facility] [id number]         Subscriber Name Subscriber Birth Date Member ID       PRESTON WALLIS 1965 [id number]                     Emergency Contacts        (Rel.) Home Phone Work Phone Mobile Phone    Kami Wallis GAYE (Spouse) [phone]    Elaine Zaldivar (Daughter) -- -- [phone]              Vital Signs (last 2 days)       Date/Time Temp Temp src Pulse Resp BP Patient Position SpO2    12/26/23 1030 98.4 (36.9) Oral 78 16 188/94 Sitting 99    12/26/23 0704 " -- -- 74 16 -- -- 99    12/26/23 0555 97.8 (36.6) Oral 73 16 164/80 Lying 98    12/25/23 2131 98.7 (37.1) Oral 85 16 174/81 Lying 97    12/25/23 1913 -- -- 84 20 -- -- 98    12/25/23 1912 -- -- 86 20 -- -- 96    12/25/23 1751 98.1 (36.7) Oral 84 16 148/77 Lying 96    12/25/23 0910 98.3 (36.8) Oral 84 16 173/82 Lying 98    12/25/23 0806 -- -- -- 16 -- -- --    12/25/23 0500 98.2 (36.8) Oral 84 16 172/78 Lying 92    12/24/23 2200 -- -- 78 16 -- -- 98    12/24/23 2018 97.7 (36.5) Axillary 80 16 161/76 Lying 97    12/24/23 1824 98 (36.7) Oral 84 16 164/74 Lying 97    12/24/23 1008 98.2 (36.8) Oral 90 16 143/79 Lying 96    12/24/23 0905 -- -- 90 -- 152/75 -- --    12/24/23 0751 -- -- -- 18 -- -- --    12/24/23 0512 98.2 (36.8) Oral 76 16 158/79 Lying 97          Oxygen Therapy (last 2 days)       Date/Time SpO2 Device (Oxygen Therapy) Flow (L/min) Oxygen Concentration (%) ETCO2 (mmHg)    12/26/23 1030 99 nasal cannula 2 -- --    12/26/23 0704 99 nasal cannula 2 -- --    12/26/23 0555 98 nasal cannula 2 -- --    12/25/23 2131 97 nasal cannula 2 -- --    12/25/23 1950 -- nasal cannula 2 -- --    12/25/23 1913 98 nasal cannula 2 -- --    12/25/23 1912 96 nasal cannula 2 -- --    12/25/23 1751 96 nasal cannula 2 -- --    12/25/23 0910 98 nasal cannula 2 -- --    12/25/23 0840 -- -- 2 -- --    12/25/23 0806 -- room air -- -- --    12/25/23 0500 92 room air -- -- --    12/24/23 2200 98 nasal cannula 2 -- --    12/24/23 2018 97 nasal cannula 2 -- --    12/24/23 1824 97 nasal cannula -- -- --    12/24/23 1008 96 nasal cannula -- -- --    12/24/23 0900 -- nasal cannula -- -- --    12/24/23 0751 -- nasal cannula 2 -- --    12/24/23 0512 97 nasal cannula 2 -- --          Intake & Output (last 2 days)         12/24 0701 12/25 0700 12/25 0701 12/26 0700 12/26 0701  12/27 0700    P.O. 720 1200 480    Total Intake(mL/kg) 720 (5.7) 1200 (9.4) 480 (3.8)    Urine (mL/kg/hr) 2600 (0.9) 3950 (1.3) 150 (0.1)    Stool  0     Total Output  "2600 7850 150    [facility] [phone]           Stool Unmeasured Occurrence  1 x           Lines, Drains & Airways       Active LDAs       Name Placement date Placement time Site Days    Peripheral IV 12/22/23 1118 Posterior;Right Hand 12/22/23  1118  Hand  4    Urethral Catheter Non-latex 16 Fr. 12/14/23 2038  -- 11    Single Lumen Implantable Port Right Subclavian --  --  Subclavian  --             Medication Administration Report for Preston Wallis \"Gómez\" as of 12/26/23 1526     Legend:    Given Hold Not Given Due Canceled Entry Other Actions    Time Time (Time) Time Time-Action         Discontinued     Completed     Future     MAR Hold     Linked             Medications 12/25/23 12/26/23      acetaminophen (TYLENOL) tablet 650 mg  Dose: 650 mg  Freq: Every 4 Hours PRN Route: PO  PRN Reason: Mild Pain  Start: 12/14/23 1826   Admin Instructions:   Do not exceed 4 grams of acetaminophen in a 24 hr period.    If given for pain, use the following pain scale:   Mild Pain = Pain Score of 1-3, CPOT 1-2  Moderate Pain = Pain Score of 4-6, CPOT 3-4  Severe Pain = Pain Score of 7-10, CPOT 5-8  Based on patient request - if ordered for moderate or severe pain, provider allows for administration of a medication prescribed for a lower pain scale.    Do not exceed 4 grams of acetaminophen in a 24 hr period. Max dose of 2gm for AST/ALT greater than 120 units/L.    If given for pain, use the following pain scale:   Mild Pain = Pain Score of 1-3, CPOT 1-2  Moderate Pain = Pain Score of 4-6, CPOT 3-4  Severe Pain = Pain Score of 7-10, CPOT 5-8     0115-Given               albuterol (ACCUNEB) nebulizer solution 0.63 mg  Dose: 0.63 mg  Freq: Every 6 Hours PRN Route: NEBULIZATION  PRN Reason: Wheezing  Start: 12/15/23 0327   Admin Instructions:   Include Respiratory Treatment Education         apixaban (ELIQUIS) tablet 5 mg  Dose: 5 mg  Freq: Every 12 Hours Scheduled Route: PO  Indications of Use: PROPHYLAXIS OF VENOUS " THROMBOEMBOLISM  Start: 12/24/23 2100   Admin Instructions:   Tablet may be crushed and suspended in 60 mL of water or D5W and immediately delivered via NG tube.    0837-Given     2133-Given           0840-Given     2100              atorvastatin (LIPITOR) tablet 20 mg  Dose: 20 mg  Freq: Nightly Route: PO  Start: 12/15/23 2100   Admin Instructions:   Avoid grapefruit juice.    2133-Given            2100               sennosides-docusate (PERICOLACE) 8.6-50 MG per tablet 2 tablet  Dose: 2 tablet  Freq: 2 Times Daily Route: PO  Start: 12/14/23 2100   Admin Instructions:   HOLD MEDICATION IF PATIENT HAS HAD BOWEL MOVEMENT. Start bowel management regimen if patient has not had a bowel movement after 12 hours.    0837-Given     (2134)-Not Given           (0841)-Not Given     2100             And  polyethylene glycol (MIRALAX) packet 17 g  Dose: 17 g  Freq: Daily PRN Route: PO  PRN Reason: Constipation  PRN Comment: Use if senna-docusate is ineffective  Start: 12/14/23 1826   Admin Instructions:   Use if no bowel movement after 12 hours. Mix in 6-8 ounces of water.  Use 4-8 ounces of water, tea, or juice for each 17 gram dose.        And  bisacodyl (DULCOLAX) EC tablet 5 mg  Dose: 5 mg  Freq: Daily PRN Route: PO  PRN Reason: Constipation  PRN Comment: Use if polyethylene glycol is ineffective  Start: 12/14/23 1826   Admin Instructions:   Use if no bowel movement after 12 hours.  Swallow whole. Do not crush, split, or chew tablet.        And  bisacodyl (DULCOLAX) suppository 10 mg  Dose: 10 mg  Freq: Daily PRN Route: RE  PRN Reason: Constipation  PRN Comment: Use if bisacodyl oral is ineffective  Start: 12/14/23 1826   Admin Instructions:   Use if no bowel movement after 12 hours.  Hold for diarrhea         budesonide-formoterol (SYMBICORT) 160-4.5 MCG/ACT inhaler 2 puff  Dose: 2 puff  Freq: 2 Times Daily - RT Route: IN  Start: 12/15/23 0930   Admin Instructions:   Include Respiratory Treatment Education     Shake well.   Rinse mouth after use, do not swallow water.  Send aerosols to pharmacy in ziplock bag for proper disposal.    0806-Given     1912-Given           0704-Given [C]     2130              calcium carbonate (oyster shell) tablet 500 mg  Dose: 500 mg  Freq: Daily Route: PO  Start: 12/15/23 0900    0837-Given            0840-Given               castor oil-balsam peru (VENELEX) ointment 1 application   Dose: 1 application   Freq: Every 12 Hours Scheduled Route: TOP  Start: 12/18/23 1300   Admin Instructions:   Apply to left Heel.    0837-Given     2135-Given           1800     2100              cholecalciferol (VITAMIN D3) tablet 2,000 Units  Dose: 2,000 Units  Freq: Daily Route: PO  Start: 12/15/23 0900    0837-Given            0840-Given               dextrose (D50W) (25 g/50 mL) IV injection 25 g  Dose: 25 g  Freq: Every 15 Minutes PRN Route: IV  PRN Reason: Low Blood Sugar  PRN Comment: Blood Sugar Less Than 70  Start: 12/14/23 2237   Admin Instructions:   Blood sugar less than 70; patient has IV access - Unresponsive, NPO or Unable To Safely Swallow         dextrose (GLUTOSE) oral gel 15 g  Dose: 15 g  Freq: Every 15 Minutes PRN Route: PO  PRN Reason: Low Blood Sugar  PRN Comment: Blood sugar less than 70  Start: 12/14/23 2237   Admin Instructions:   BS<70, Patient Alert, Is not NPO, Can safely swallow.         DULoxetine (CYMBALTA) DR capsule 60 mg  Dose: 60 mg  Freq: 2 Times Daily Route: PO  Start: 12/15/23 0900   Admin Instructions:   Do not crush or chew the capsules or tablets. The drug may not work as designed if the capsule or tablet is crushed or chewed. Swallow whole.  Caution: Look alike/sound alike drug alert. Capsule may be opened and sprinkled on applesauce or apple juice. Do not crush or chew capsule.    0837-Given     2133-Given           0840-Given     2100              famotidine (PEPCID) tablet 40 mg  Dose: 40 mg  Freq: Every Morning Route: PO  Start: 12/15/23 0700    0617-Given             0600-Given               ferrous sulfate tablet 325 mg  Dose: 325 mg  Freq: 2 Times Daily With Meals Route: PO  Start: 12/17/23 1800   Admin Instructions:   Swallow whole. Do not crush, split, or chew. Take with food if GI upset occurs.    0837-Given     1842-Given           0840-Given     1800              finasteride (PROSCAR) tablet 5 mg  Dose: 5 mg  Freq: Daily Route: PO  Start: 12/15/23 0900   Admin Instructions:   Do not crush or chew the capsules or tablets. Contact Pharmacy if needed.  Group 2 ([place]) Hazardous Drug - Reproductive Risk Only - See Handling Guide    0837-Given            0840-Given               folic acid (FOLVITE) tablet 1 mg  Dose: 1 mg  Freq: Daily Route: PO  Start: 12/15/23 0900    0837-Given            0840-Given               gabapentin (NEURONTIN) capsule 300 mg  Dose: 300 mg  Freq: Every 12 Hours Scheduled Route: PO  Start: 12/15/23 0900   Admin Instructions:         0837-Given     2133-Given           0840-Given     2100              glucagon (GLUCAGEN) injection 1 mg  Dose: 1 mg  Freq: Every 15 Minutes PRN Route: IM  PRN Reason: Low Blood Sugar  PRN Comment: Blood Glucose Less Than 70  Start: 12/14/23 2237   Admin Instructions:   Blood Glucose Less Than 70 - Patient Without IV Access - Unresponsive, NPO or Unable To Safely Swallow  Reconstitute powder for injection by adding 1 mL of -supplied sterile diluent or sterile water for injection to a vial containing 1 mg of the drug, to provide solutions containing 1 mg/mL. Shake vial gently to dissolve.         heparin injection 500 Units  Dose: 5 mL  Freq: As Needed Route: IV  PRN Reason: Line Care  PRN Comment: Prior to De-Accessing Port  Start: 12/14/23 2014         hydrocortisone-bacitracin-zinc oxide-nystatin (MAGIC BARRIER) ointment 1 application   Dose: 1 application   Freq: Every 12 Hours Scheduled Route: TOP  Start: 12/18/23 1300   Admin Instructions:   For topical use only    0838-Given     2134-Given            1800     2100              insulin glargine (LANTUS, SEMGLEE) injection 50 Units  Dose: 50 Units  Freq: Daily Route: SC  Start: 12/15/23 0900   Admin Instructions:   Do not hold basal insulin without an order. Consider requesting a dose edit, if needed.        0838-Given            0840-Given               insulin lispro (HUMALOG/ADMELOG) injection 2-7 Units  Dose: 2-7 Units  Freq: 4 Times Daily Before Meals & Nightly Route: SC  Start: 12/14/23 2253   Admin Instructions:   Correction Insulin - Low Dose - Total Insulin Dose Less Than 40 units/day (Lean, Elderly or Renal Patients)    Blood Glucose 150-199 mg/dL - 2 units  Blood Glucose 200-249 mg/dL - 3 units  Blood Glucose 250-299 mg/dL - 4 units  Blood Glucose 300-349 mg/dL - 5 units  Blood Glucose 350-400 mg/dL - 6 units  Blood Glucose Greater Than 400 mg/dL - 7 units & Call Provider     Caution: Look alike/sound alike drug alert      (0759)-Not Given     (1122)-Not Given     (1633)-Not Given     (2135)-Not Given         (0744)-Not Given     1126-Given     1730     2100            insulin lispro (HUMALOG/ADMELOG) injection 8 Units  Dose: 8 Units  Freq: 3 Times Daily Before Meals Route: SC  Start: 12/15/23 0730   Admin Instructions:    Solution should be clear. If cloudy, contact pharmacy for a new vial. Scheduled mealtime doses of this medication should be held if patient NPO.     Caution: Look alike/sound alike drug alert      (0800)-Not Given [C]     1153-Given     (1633)-Not Given [C]          (0745)-Not Given     1126-Given     1730             losartan (COZAAR) tablet 25 mg  Dose: 25 mg  Freq: Every 24 Hours Scheduled Route: PO  Start: 12/26/23 1230   Admin Instructions:   Hold for SBP less than 100, DBP less than 60     1422-Given               magnesium oxide (MAG-OX) tablet 400 mg  Dose: 400 mg  Freq: Daily Route: PO  Start: 12/15/23 0900   Admin Instructions:   Each 400mg of magnesium oxide is equal to 241.3mg of elemental magnesium.    0837-Given             0840-Given               melatonin tablet 3 mg  Dose: 3 mg  Freq: Nightly PRN Route: PO  PRN Reason: Sleep  Start: 12/14/23 1826         montelukast (SINGULAIR) tablet 10 mg  Dose: 10 mg  Freq: Nightly Route: PO  Start: 12/15/23 2100    2135-Given            2100               mupirocin (BACTROBAN) 2 % ointment 1 application   Dose: 1 application   Freq: Every 12 Hours Scheduled Route: TOP  Start: 12/18/23 1400   Admin Instructions:   Apply to abdomen excoriation         0838-Given     2134-Given           1800     2100              HYDROmorphone (DILAUDID) injection 0.5 mg  Dose: 0.5 mg  Freq: Every 2 Hours PRN Route: IV  PRN Reason: Severe Pain  Start: 12/14/23 1826   End: 12/21/23 1825   Admin Instructions:   Based on patient request - if ordered for moderate or severe pain, provider allows for administration of a medication prescribed for a lower pain scale.  If given for pain, use the following pain scale:  Mild Pain = Pain Score of 1-3, CPOT 1-2  Moderate Pain = Pain Score of 4-6, CPOT 3-4  Severe Pain = Pain Score of 7-10, CPOT 5-8        And  naloxone (NARCAN) injection 0.4 mg  Dose: 0.4 mg  Freq: Every 5 Minutes PRN Route: IV  PRN Reason: Respiratory Depression  Start: 12/14/23 1826   Admin Instructions:   If Respiratory Rate Less Than 8 or Patient is Difficult to Arouse, Stop ALL Narcotics & Contact Provider.  Administer Slow IV Push.  Repeat As Ordered Until Respiratory Rate is Greater Than 12.         NIFEdipine XL (PROCARDIA XL) 24 hr tablet 30 mg  Dose: 30 mg  Freq: Every Morning Route: PO  Start: 12/15/23 0700   Admin Instructions:   Hold for SBP less than 100, DBP less than 60  Caution: Look alike/sound alike drug alert. Avoid grapefruit juice. Swallow whole. Do not crush, split or chew.    0617-Given            0600-Given               O2 (OXYGEN)  Dose: 2 L/min  Freq: Once Route: IN  Start: 12/15/23 0415         ondansetron (ZOFRAN) tablet 4 mg  Dose: 4 mg  Freq: Every 6 Hours PRN Route: PO  PRN  Reasons: Nausea,Vomiting  Start: 12/14/23 1826        Or  ondansetron (ZOFRAN) injection 4 mg  Dose: 4 mg  Freq: Every 6 Hours PRN Route: IV  PRN Reasons: Nausea,Vomiting  Start: 12/14/23 1826         sodium chloride 0.9 % flush 10 mL  Dose: 10 mL  Freq: As Needed Route: IV  PRN Reason: Line Care  PRN Comment: After Medication Administration & Prior to De-Accessing Port  Start: 12/14/23 2014         sodium chloride 0.9 % flush 10 mL  Dose: 10 mL  Freq: Every 12 Hours Scheduled Route: IV  Start: 12/14/23 2100   Admin Instructions:   If Port Accessed But Not In Use    0838-Given     2133-Given           0840-Given     2100              sodium chloride 0.9 % flush 10 mL  Dose: 10 mL  Freq: As Needed Route: IV  PRN Reason: Line Care  Start: 12/14/23 2014         sodium chloride 0.9 % flush 10 mL  Dose: 10 mL  Freq: As Needed Route: IV  PRN Reason: Line Care  Start: 12/14/23 1502         sodium chloride 0.9 % flush 20 mL  Dose: 20 mL  Freq: As Needed Route: IV  PRN Reason: Line Care  PRN Comment: After Blood Draws or Blood Product Administration  Start: 12/14/23 2014         sodium chloride 0.9 % infusion 40 mL  Dose: 40 mL  Freq: As Needed Route: IV  PRN Reason: Line Care  Start: 12/14/23 2014   Admin Instructions:   Following administration of an IV intermittent medication, flush line with 40mL NS at 100mL/hr.        Future Medications  Medications 12/25/23 12/26/23       carvedilol (COREG) tablet 25 mg  Dose: 25 mg  Freq: Every 12 Hours Scheduled Route: PO  Start: 12/26/23 2100   Admin Instructions:   Hold for SBP less than 100, DBP less than 60, or heart rate less than 50  Give with food.     2100              Completed Medications  Medications 12/25/23 12/26/23       apixaban (ELIQUIS) tablet 2.5 mg  Dose: 2.5 mg  Freq: Every 12 Hours Scheduled Route: PO  Indications of Use: PROPHYLAXIS OF VENOUS THROMBOEMBOLISM  Start: 12/23/23 0900   End: 12/24/23 0906   Admin Instructions:   Tablet may be crushed and suspended in  60 mL of water or D5W and immediately delivered via NG tube.  Avoid grapefruit juice.         ceFAZolin in dextrose (ANCEF) IVPB solution 2,000 mg  Dose: 2,000 mg  Freq: Every 8 Hours Route: IV  Indications of Use: PERIOPERATIVE PHARMACOPROPHYLAXIS  Start: 12/22/23 2030   End: 12/23/23 0512   Admin Instructions:   Time first dose from pre-op dose.    Caution: Look alike/sound alike drug alert         ceFAZolin in Sodium Chloride (ANCEF) IVPB solution 3 g  Dose: 3 g  Freq: Once Route: IV  Indications of Use: PERIOPERATIVE PHARMACOPROPHYLAXIS  Start: 12/22/23 1121   End: 12/22/23 1234   Admin Instructions:   Administer Within 1 Hour of Surgical Incision.  Redose 4 Hours From Pre-Op Dose if Procedure Ongoing or Blood Loss Greater Than 1.5 L  Caution: Look alike/sound alike drug alert. Refrigerate         ethyl alcohol 62 % 2 each  Dose: 2 swab   Freq: Once Route: NA  Start: 12/22/23 1121   End: 12/22/23 1155   Admin Instructions:   Administer 15-60 minutes prior to surgery following these steps: Clean nostrils with a tissue, apply a 62% ethyl alcohol swab to the right nostril gently rotating the swab for 30 seconds, repeat the process with the 2nd swab in the left nostril.         famotidine (PEPCID) injection 20 mg  Dose: 20 mg  Freq: Once Route: IV  Start: 12/22/23 1138   End: 12/22/23 1144   Admin Instructions:   Give IV push over 2 minutes.         fentaNYL citrate (PF) (SUBLIMAZE) injection 50 mcg  Dose: 50 mcg  Freq: Once Route: IV  Indications of Use: PERIPROCEDURAL SEDATION  Start: 12/22/23 1138   End: 12/22/23 1158   Admin Instructions:   For procedure sedation  If given for pain, use the following pain scale:  Mild Pain = Pain Score of 1-3, CPOT 1-2  Moderate Pain = Pain Score of 4-6, CPOT 3-4  Severe Pain = Pain Score of 7-10, CPOT 5-8         morphine injection 4 mg  Dose: 4 mg  Freq: Once Route: IV  Start: 12/14/23 1703   End: 12/14/23 1657   Admin Instructions:   Based on patient request - if ordered for  "moderate or severe pain, provider allows for administration of a medication prescribed for a lower pain scale.  If given for pain, use the following pain scale:  Mild Pain = Pain Score of 1-3, CPOT 1-2  Moderate Pain = Pain Score of 4-6, CPOT 3-4  Severe Pain = Pain Score of 7-10, CPOT 5-8         ondansetron (ZOFRAN) injection 4 mg  Dose: 4 mg  Freq: Once Route: IV  Start: 12/14/23 1703   End: 12/14/23 1659   Admin Instructions:   \"If multiple N/V medications ordered, use in the following order: Ondansetron, Prochlorperazine, Promethazine. Use PO unless patient refuses or patient unable to swallow.\"         ropivacaine (NAROPIN) 0.5 % injection  - ADS Override Pull  Start: 12/22/23 1151   End: 12/22/23 1205   Admin Instructions:   Created by cabinet override         vancomycin IVPB 2000 mg in 0.9% Sodium Chloride 500 mL  Dose: 15 mg/kg  Weight Dosing Info: 127 kg  Freq: Once Route: IV  Indications of Use: PERIOPERATIVE PHARMACOPROPHYLAXIS  Start: 12/23/23 1000   End: 12/23/23 1228   Admin Instructions:   Time First Dose 12 Hours After Pre-Op Dose   Order specific questions:   SCIP Justification for use: Documentation of MRSA Colonization           vancomycin IVPB 2000 mg in 0.9% Sodium Chloride 500 mL  Dose: 15 mg/kg  Weight Dosing Info: 127 kg  Freq: Once Route: IV  Indications of Use: PERIOPERATIVE PHARMACOPROPHYLAXIS  Start: 12/22/23 1121   End: 12/22/23 1334   Admin Instructions:   Administer Within 2 Hours of Surgical Incision.     Order specific questions:   SCIP Justification for use: Documentation of MRSA Colonization          Discontinued Medications  Medications 12/25/23 12/26/23       apixaban (ELIQUIS) tablet 5 mg  Dose: 5 mg  Freq: Every 12 Hours Scheduled Route: PO  Indications of Use: Post Surgical - Other  Start: 12/16/23 1100   End: 12/25/23 2136   Admin Instructions:   Tablet may be crushed and suspended in 60 mL of water or D5W and immediately delivered via NG tube.    0900-ProviderHeld     " 2100-ProviderHeld     2136-Unheld by provider              diphenhydrAMINE (BENADRYL) injection 12.5 mg  Dose: 12.5 mg  Freq: Every 15 Minutes PRN Route: IV  PRN Reason: Itching  PRN Comment: May repeat x 1  Start: 12/22/23 1418   End: 12/22/23 1552   Admin Instructions:   Caution: Look alike/sound alike drug alert. This med may be ordered in other forms and routes. Before giving verify the last time the drug was given by any route/form.         droperidol (INAPSINE) injection 0.625 mg  Dose: 0.625 mg  Freq: Every 20 Minutes PRN Route: IV  PRN Reasons: Nausea,Vomiting  Indications of Use: NAUSEA AND VOMITING  Start: 12/22/23 1418   End: 12/22/23 1552   Admin Instructions:   Use ondansetron first; proceed to droperidol for nausea refractory to ondansetron.        Or  droperidol (INAPSINE) injection 0.625 mg  Dose: 0.625 mg  Freq: Every 20 Minutes PRN Route: IM  PRN Reasons: Nausea,Vomiting  Indications of Use: NAUSEA AND VOMITING  Start: 12/22/23 1418   End: 12/22/23 1552   Admin Instructions:   Use ondansetron first; proceed to droperidol for nausea refractory to ondansetron.         ePHEDrine injection 5 mg  Dose: 5 mg  Freq: Once As Needed Route: IV  PRN Comment: symptomatic hypotension - Notify attending anesthesiologist if this needs to be given  Start: 12/22/23 1418   End: 12/22/23 1552   Admin Instructions:   Caution: Look alike/sound alike drug alert   Dilute with NS to 5-10 mg/mL.  Central line preferred, if unavailable use large bore IV access with frequent nurse monitoring of IV site.         fentaNYL citrate (PF) (SUBLIMAZE) injection 25 mcg  Dose: 25 mcg  Freq: Every 5 Minutes PRN Route: IV  PRN Reason: Severe Pain  Start: 12/22/23 1418   End: 12/22/23 1552   Admin Instructions:   Maximum total dose of fentanyl is 200 mcg.  If given for pain, use the following pain scale:  Mild Pain = Pain Score of 1-3, CPOT 1-2  Moderate Pain = Pain Score of 4-6, CPOT 3-4  Severe Pain = Pain Score of 7-10, CPOT 5-8          fentaNYL citrate (PF) (SUBLIMAZE) injection 50 mcg  Dose: 50 mcg  Freq: Once As Needed Route: IV  PRN Reason: Severe Pain  Start: 12/22/23 1135   End: 12/22/23 1434   Admin Instructions:   Maximum total dose of fentanyl is 50 mcg without additional orders from physician. May be used for preoperative pain or procedural sedation.  If given for pain, use the following pain scale:  Mild Pain = Pain Score of 1-3, CPOT 1-2  Moderate Pain = Pain Score of 4-6, CPOT 3-4  Severe Pain = Pain Score of 7-10, CPOT 5-8         ferrous sulfate tablet 325 mg  Dose: 325 mg  Freq: Daily With Breakfast Route: PO  Start: 12/15/23 0800   End: 12/17/23 0912   Admin Instructions:   Swallow whole. Do not crush, split, or chew. Take with food if GI upset occurs.         flumazenil (ROMAZICON) injection 0.2 mg  Dose: 0.2 mg  Freq: As Needed Route: IV  PRN Comment: for benzodiazepine induced unresponsiveness or sedation  Indications of Use: BENZODIAZEPINE-INDUCED SEDATION  Start: 12/22/23 1418   End: 12/22/23 1552   Admin Instructions:   Notify Anesthesia if given  ** give IV over 15-30 seconds **         hydrALAZINE (APRESOLINE) injection 5 mg  Dose: 5 mg  Freq: Every 10 Minutes PRN Route: IV  PRN Reason: High Blood Pressure  PRN Comment: for systolic blood pressure greater than 180 mmHg or diastolic blood pressure greater than 105 mmHg  Start: 12/22/23 1418   End: 12/22/23 1552   Admin Instructions:   Up to 20 mg. If labetalol and hydralazine are both ordered, use labetalol first.  Caution: Look alike/sound alike drug alert         HYDROcodone-acetaminophen (NORCO) 5-325 MG per tablet 1 tablet  Dose: 1 tablet  Freq: Once As Needed Route: PO  PRN Reason: Moderate Pain  Start: 12/22/23 1418   End: 12/22/23 1552   Admin Instructions:   Based on patient request - if ordered for moderate or severe pain, provider allows for administration of a medication prescribed for a lower pain scale.  [ANDREINA]    Do not exceed 4 grams of acetaminophen in a 24  hr period. Max dose of 2gm for AST/ALT greater than 120 units/L        If given for pain, use the following pain scale:   Mild Pain = Pain Score of 1-3, CPOT 1-2  Moderate Pain = Pain Score of 4-6, CPOT 3-4  Severe Pain = Pain Score of 7-10, CPOT 5-8         HYDROcodone-acetaminophen (NORCO) 5-325 MG per tablet 1 tablet  Dose: 1 tablet  Freq: Every 4 Hours PRN Route: PO  PRN Reason: Moderate Pain  Start: 12/14/23 1826   End: 12/24/23 1825   Admin Instructions:   [ANDREINA]    Do not exceed 4 grams of acetaminophen in a 24 hr period.    If given for pain, use the following pain scale:   Mild Pain = Pain Score of 1-3, CPOT 1-2  Moderate Pain = Pain Score of 4-6, CPOT 3-4  Severe Pain = Pain Score of 7-10, CPOT 5-8  [ANDREINA]    Do not exceed 4 grams of acetaminophen in a 24 hr period. Max dose of 2gm for AST/ALT greater than 120 units/L        If given for pain, use the following pain scale:   Mild Pain = Pain Score of 1-3, CPOT 1-2  Moderate Pain = Pain Score of 4-6, CPOT 3-4  Severe Pain = Pain Score of 7-10, CPOT 5-8         HYDROcodone-acetaminophen (NORCO) 7.5-325 MG per tablet 1 tablet  Dose: 1 tablet  Freq: Every 4 Hours PRN Route: PO  PRN Reason: Severe Pain  Start: 12/22/23 1418   End: 12/22/23 1552   Admin Instructions:   Based on patient request - if ordered for moderate or severe pain, provider allows for administration of a medication prescribed for a lower pain scale.  [ANDREINA]    Do not exceed 4 grams of acetaminophen in a 24 hr period. Max dose of 2gm for AST/ALT greater than 120 units/L        If given for pain, use the following pain scale:   Mild Pain = Pain Score of 1-3, CPOT 1-2  Moderate Pain = Pain Score of 4-6, CPOT 3-4  Severe Pain = Pain Score of 7-10, CPOT 5-8         HYDROcodone-acetaminophen (NORCO) 7.5-325 MG per tablet 1 tablet  Dose: 1 tablet  Freq: Every 6 Hours PRN Route: PO  PRN Reason: Moderate Pain  Start: 12/14/23 2241   End: 12/21/23 2240   Admin Instructions:   Based on patient request - if  ordered for moderate or severe pain, provider allows for administration of a medication prescribed for a lower pain scale.  [ANDREINA]    Do not exceed 4 grams of acetaminophen in a 24 hr period. Max dose of 2gm for AST/ALT greater than 120 units/L        If given for pain, use the following pain scale:   Mild Pain = Pain Score of 1-3, CPOT 1-2  Moderate Pain = Pain Score of 4-6, CPOT 3-4  Severe Pain = Pain Score of 7-10, CPOT 5-8         HYDROmorphone (DILAUDID) injection 0.25 mg  Dose: 0.25 mg  Freq: Every 5 Minutes PRN Route: IV  PRN Reason: Moderate Pain  Start: 12/22/23 1418   End: 12/22/23 1552   Admin Instructions:   Maximum total dose of hydromorphone is 2 mg.  If given for pain, use the following pain scale:  Mild Pain = Pain Score of 1-3, CPOT 1-2  Moderate Pain = Pain Score of 4-6, CPOT 3-4  Severe Pain = Pain Score of 7-10, CPOT 5-8         ipratropium-albuterol (DUO-NEB) nebulizer solution 3 mL  Dose: 3 mL  Freq: Once As Needed Route: NEBULIZATION  PRN Reasons: Wheezing,Shortness of Air  PRN Comment: bronchospasm  Start: 12/22/23 1418   End: 12/22/23 1552   Admin Instructions:   Notify Anesthesia if minineb is given         labetalol (NORMODYNE,TRANDATE) injection 5 mg  Dose: 5 mg  Freq: Every 5 Minutes PRN Route: IV  PRN Reason: High Blood Pressure  PRN Comment: for systolic blood pressure greater than 180 mmHg or diastolic blood pressure greater than 105 mmHg  Start: 12/22/23 1418   End: 12/22/23 1552   Admin Instructions:   Hold for heart rate less than 60.    If labetalol and hydralazine are both ordered, use labetalol first. Maximum dose of labetalol is 20mg IV while in PACU, notify anesthesiologist for further orders if needed.  Give IV Push over 2 minutes.         lactated ringers infusion  Rate: 9 mL/hr Dose: 9 mL/hr  Freq: Continuous Route: IV  Start: 12/22/23 1138   End: 12/22/23 1553         lidocaine (XYLOCAINE) 1 % injection 0.5 mL  Dose: 0.5 mL  Freq: Once As Needed Route: ID  PRN Comment: IV  Start  Start: 12/22/23 1135   End: 12/22/23 1434         metoprolol tartrate (LOPRESSOR) tablet 100 mg  Dose: 100 mg  Freq: 2 Times Daily Route: PO  Start: 12/15/23 0900   End: 12/26/23 1136   Admin Instructions:   Hold for SBP less than 100, DBP less than 60, or heart rate less than 50    0837-Given     2133-Given           0840-Given               midazolam (VERSED) injection 1 mg  Dose: 1 mg  Freq: Every 5 Minutes PRN Route: IV  PRN Comment: Anxiety prophylaxis, Pre-op comfort  Start: 12/22/23 1135   End: 12/22/23 1434   Admin Instructions:   May repeat dose in 5 minutes one time then contact provider for additional orders.             midazolam (VERSED) injection 2 mg  Dose: 2 mg  Freq: Once Route: IV  Indications Comment: Periprocedural Sedation  Start: 12/22/23 1138   End: 12/22/23 1434   Admin Instructions:   For procedure sedation             naloxone (NARCAN) injection 0.2 mg  Dose: 0.2 mg  Freq: As Needed Route: IV  PRN Reasons: Opioid Reversal,Respiratory Depression  PRN Comment: unresponsiveness, decrease oxygen saturation  Indications of Use: ACUTE RESPIRATORY FAILURE,OPIOID-INDUCED RESPIRATORY DEPRESSION  Start: 12/22/23 1418   End: 12/22/23 1552   Admin Instructions:   Notify Anesthesia if given         ondansetron (ZOFRAN) injection 4 mg  Dose: 4 mg  Freq: Once As Needed Route: IV  PRN Reasons: Nausea,Vomiting  Indications of Use: POSTOPERATIVE NAUSEA AND VOMITING  Start: 12/22/23 1418   End: 12/22/23 1552   Admin Instructions:   If BOTH ondansetron (ZOFRAN) and promethazine (PHENERGAN) are ordered use ondansetron first and THEN promethazine IF ondansetron is ineffective.         promethazine (PHENERGAN) suppository 25 mg  Dose: 25 mg  Freq: Once As Needed Route: RE  PRN Reasons: Nausea,Vomiting  Start: 12/22/23 1418   End: 12/22/23 1552   Admin Instructions:   If BOTH ondansetron (ZOFRAN) and promethazine (PHENERGAN) are ordered use ondansetron first and THEN promethazine IF ondansetron is  ineffective.        Or  promethazine (PHENERGAN) tablet 25 mg  Dose: 25 mg  Freq: Once As Needed Route: PO  PRN Reasons: Nausea,Vomiting  Start: 12/22/23 1418   End: 12/22/23 1552   Admin Instructions:   If BOTH ondansetron (ZOFRAN) and promethazine (PHENERGAN) are ordered use ondansetron first and THEN promethazine IF ondansetron is ineffective.             sodium chloride (NS) irrigation solution  Freq: As Needed  Start: 12/22/23 1302   End: 12/22/23 1406         sodium chloride 0.9 % flush 3 mL  Dose: 3 mL  Freq: Every 12 Hours Scheduled Route: IV  Start: 12/22/23 1138   End: 12/22/23 1434         sodium chloride 0.9 % flush 3-10 mL  Dose: 3-10 mL  Freq: As Needed Route: IV  PRN Reason: Line Care  Start: 12/22/23 1135   End: 12/22/23 1434         sodium chloride 0.9 % infusion  Rate: 75 mL/hr Dose: 75 mL/hr  Freq: Continuous Route: IV  Start: 12/14/23 1842   End: 12/16/23 0727         sterile water irrigation solution  Freq: As Needed  Start: 12/22/23 1302   End: 12/22/23 1406                    Blood Administration Record (From admission, onward)      Completed transfusions       Ordered     Start    12/22/23 1156  Transfuse RBC Infuse Each Unit Over: 3.5H  Transfusion        Released Time Blood Unit Number Status   12/22/23 1253   23  [id number]  [id number] Completed 12/22/23 1424       12/22/23 1156            Canceled transfusions       Ordered     Start    12/22/23 1326  Transfuse RBC  Status:  Canceled      Released Time Blood Unit Number Status   12/22/23 1326   23  [id number]  *-[id number] Transfusing       12/22/23 1326             Lab Results (last 48 hours)       Procedure Component Value Units Date/Time    POC Glucose Once [[id number]]  (Abnormal) Collected: 12/26/23 1118    Specimen: Blood Updated: 12/26/23 1119     Glucose 182 mg/dL     Renal Function Panel [[id number]]  (Abnormal) Collected: 12/26/23 0541    Specimen: Blood Updated: 12/26/23 0726     Glucose 81 mg/dL      BUN 29 mg/dL      Creatinine  1.48 mg/dL      Sodium 141 mmol/L      Potassium 4.3 mmol/L      Chloride 102 mmol/L      CO2 29.2 mmol/L      Calcium 9.0 mg/dL      Albumin 3.0 g/dL      Phosphorus 2.7 mg/dL      Anion Gap 9.8 mmol/L      BUN/Creatinine Ratio 19.6     eGFR 54.5 mL/min/1.73     Narrative:      GFR Normal >60  Chronic Kidney Disease <60  Kidney Failure <15      POC Glucose Once [[id number]]  (Normal) Collected: 12/26/23 0717    Specimen: Blood Updated: 12/26/23 0721     Glucose 87 mg/dL     CBC & Differential [[id number]]  (Abnormal) Collected: 12/26/23 0541    Specimen: Blood Updated: 12/26/23 0708    Narrative:      The following orders were created for panel order CBC & Differential.  Procedure                               Abnormality         Status                     ---------                               -----------         ------                     CBC Auto Differential[[id number]]        Abnormal            Final result                 Please view results for these tests on the individual orders.    CBC Auto Differential [[id number]]  (Abnormal) Collected: 12/26/23 0541    Specimen: Blood Updated: 12/26/23 0708     WBC 9.86 10*3/mm3      RBC 3.41 10*6/mm3      Hemoglobin 9.7 g/dL      Hematocrit 30.5 %      MCV 89.4 fL      MCH 28.4 pg      MCHC 31.8 g/dL      RDW 13.5 %      RDW-SD 44.1 fl      MPV 8.8 fL      Platelets 498 10*3/mm3      Neutrophil % 46.5 %      Lymphocyte % 25.1 %      Monocyte % 16.0 %      Eosinophil % 11.0 %      Basophil % 0.8 %      Immature Grans % 0.6 %      Neutrophils, Absolute 4.59 10*3/mm3      Lymphocytes, Absolute 2.47 10*3/mm3      Monocytes, Absolute 1.58 10*3/mm3      Eosinophils, Absolute 1.08 10*3/mm3      Basophils, Absolute 0.08 10*3/mm3      Immature Grans, Absolute 0.06 10*3/mm3      nRBC 0.0 /100 WBC     POC Glucose Once [[id number]]  (Normal) Collected: 12/25/23 2052    Specimen: Blood Updated: 12/25/23 2053     Glucose 101 mg/dL     POC Glucose Once [[id number]]  (Normal) Collected:  23 1554    Specimen: Blood Updated: 23 1555     Glucose 87 mg/dL     POC Glucose Once [[id number]]  (Normal) Collected: 23 1057    Specimen: Blood Updated: 23 1058     Glucose 118 mg/dL     POC Glucose Once [[id number]]  (Normal) Collected: 23 0704    Specimen: Blood Updated: 23 0705     Glucose 96 mg/dL     POC Glucose Once [[id number]]  (Normal) Collected: 23 204    Specimen: Blood Updated: 23 204     Glucose 117 mg/dL     POC Glucose Once [[id number]]  (Normal) Collected: 23 1620    Specimen: Blood Updated: 23 162     Glucose 117 mg/dL              Physician Progress Notes (last 48 hours)        Romeo Onofre MD at 23 1116              [facility] Progress Note      Patient Name: Preston Wallis  : 1965  MRN: [id number]  Primary Care Physician:  Kimmy Riley MD  Date of admission: 2023    Subjective     Interval History:   F/u CKD3    Review of Systems:   BP high 160s to 180s systolic  Denies dyspnea or nausea     Objective     Vitals:   Temp:  [97.8 °F (36.6 °C)-98.7 °F (37.1 °C)] 98.4 °F (36.9 °C)  Heart Rate:  [73-86] 78  Resp:  [16-20] 16  BP: (148-188)/(77-94) 188/94  Flow (L/min):  [2] 2    Intake/Output Summary (Last 24 hours) at 2023 1116  Last data filed at 2023 0723  Gross per 24 hour   Intake 1200 ml   Output 3950 ml   Net -2750 ml       Physical Exam:    General Appearance: alert, comfortable   Neck: supple, no JVD  Lungs: CTA bilat no rales   Heart: RRR, normal S1 and S2  Abdomen: soft, nontender, nondistended  : +lopez  Extremities: trace BLE pedal/ankle edema    Scheduled Meds:     apixaban, 5 mg, Oral, Q12H  atorvastatin, 20 mg, Oral, Nightly  budesonide-formoterol, 2 puff, Inhalation, BID - RT  calcium carbonate (oyster shell), 500 mg, Oral, Daily  castor oil-balsam peru, 1 application , Topical, Q12H  cholecalciferol, 2,000 Units, Oral, Daily  DULoxetine, 60 mg,  Oral, BID  famotidine, 40 mg, Oral, QAM  ferrous sulfate, 325 mg, Oral, BID With Meals  finasteride, 5 mg, Oral, Daily  folic acid, 1 mg, Oral, Daily  gabapentin, 300 mg, Oral, Q12H  hydrocortisone-bacitracin-zinc oxide-nystatin, 1 application , Topical, Q12H  insulin glargine, 50 Units, Subcutaneous, Daily  insulin lispro, 2-7 Units, Subcutaneous, 4x Daily AC & at Bedtime  insulin lispro, 8 Units, Subcutaneous, TID AC  magnesium oxide, 400 mg, Oral, Daily  metoprolol tartrate, 100 mg, Oral, BID  montelukast, 10 mg, Oral, Nightly  mupirocin, 1 application , Topical, Q12H  NIFEdipine XL, 30 mg, Oral, QAM  O2, 2 L/min, Inhalation, Once  senna-docusate sodium, 2 tablet, Oral, BID  sodium chloride, 10 mL, Intravenous, Q12H      IV Meds:        Results Reviewed:   I have personally reviewed the results from the time of this admission to 12/26/2023 11:16 EST     Results from last 7 days   Lab Units 12/26/23  0541 12/24/23  0616 12/23/23  0620 12/22/23  0431   SODIUM mmol/L 141 139 137 136   POTASSIUM mmol/L 4.3 4.3 4.7 4.5   CHLORIDE mmol/L 102 101 98 99   CO2 mmol/L 29.2* 29.4* 29.9* 30.0*   BUN mg/dL 29* 28* 26* 26*   CREATININE mg/dL 1.48* 1.77* 1.43* 1.52*   CALCIUM mg/dL 9.0 8.9 9.1 9.0   BILIRUBIN mg/dL  --  0.3 0.2 0.3   ALK PHOS U/L  --  155* 168* 149*   ALT (SGPT) U/L  --  <5 12 16   AST (SGOT) U/L  --  16 18 15   GLUCOSE mg/dL 81 102* 191* 110*     Estimated Creatinine Clearance: 71.7 mL/min (A) (by C-G formula based on SCr of 1.48 mg/dL (H)).  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  Also obs uropathy related to  neurogenic bladder.  Dr Martínez follows.  Cr improved 1.4, BL mid 1's.  Lytes stable.  Also on farxiga, on hold currently  HTN - uncontrolled, on max metop 100 BID and low dose nifedipine ER 30mg but hx edema makes me reluctant to titrate CCB; looks like he was on on ARB (losartan 25mg) at office visit 2023, not on admission med list however  Neurogenic bladder, self caths at home, currently with lopez here   Proximal left tibia and fibula fractures on the left side. S/p surgical repair on 2023 by orthopedics followed closely.   DM2 w/ complications ( , gastroparesis , neuropathy , nephropathy ); mgmt per [facility]  Edema - mild, but need to clarify if still on diuretic at home (was on buemx 2mg BID as of 2023, while no loop diuretics listed on admission).  PAF on AC (eliquis)    PLAN:  Change metop to coreg 25 BID  Resume ARB resume  Resume farxiga at discharge  Clarify if on bumex still at home   Rehab placement in progress      Romeo Onofre MD  23  11:16 [facility]    [facility]  [phone]    Electronically signed by Romeo Onofre MD at 23 [phone]       Shabbir Peraza MD at 23 [phone]              Name: Preston Wallis ADMIT: 2023   : 1965  PCP: Kimmy Riley MD    MRN: [id number] LOS: 4 days   AGE/SEX: 58 y.o. male  ROOM: [facility]     Subjective   Subjective   Patient seen at bedside.      Objective   Objective   Vital Signs  Temp:  [97.7 °F (36.5 °C)-98.3 °F (36.8 °C)] 98.1 °F (36.7 °C)  Heart Rate:  [78-84] 84  Resp:  [16] 16  BP: (148-173)/(74-82) 148/77  SpO2:  [92 %-98 %] 96 %  on  Flow (L/min):  [2] 2;   Device (Oxygen Therapy): nasal cannula  Body mass index is 41.35 kg/m².  Physical Exam  General, awake and alert.  Head and ENT, normocephalic and atraumatic.  Lungs, symmetric expansion, equal air entry bilaterally.  Heart, regular rate and rhythm.  Abdomen, soft and nontender.  Extremities, no clubbing or  cyanosis.  Neuro, no focal deficits.  Skin: Warm and no rash.  Psych, normal mood and affect.  Musculoskeletal, joint examination is grossly normal.             Copied text material from yesterday's note has been reviewed for appropriate changes and remains accurate as of 12/25/23.      Results Review     I reviewed the patient's new clinical results.  Results from last 7 days   Lab Units 12/24/23  0616 12/23/23  0440 12/22/23  0431 12/21/23  0538   WBC 10*3/mm3 10.24 9.25 10.28 10.22   HEMOGLOBIN g/dL 9.3* 10.0* 7.6* 8.1*   PLATELETS 10*3/mm3 439 448 477* 430     Results from last 7 days   Lab Units 12/24/23  0616 12/23/23  0620 12/22/23  0431 12/21/23  0538   SODIUM mmol/L 139 137 136 136   POTASSIUM mmol/L 4.3 4.7 4.5 4.8   CHLORIDE mmol/L 101 98 99 99   CO2 mmol/L 29.4* 29.9* 30.0* 28.5   BUN mg/dL 28* 26* 26* 31*   CREATININE mg/dL 1.77* 1.43* 1.52* 1.74*   GLUCOSE mg/dL 102* 191* 110* 170*   EGFR mL/min/1.73 44.0* 56.8* 52.8* 44.9*     Results from last 7 days   Lab Units 12/24/23  0616 12/23/23  0620 12/22/23  0431 12/21/23  0538   ALBUMIN g/dL 2.9* 3.1* 2.7* 3.0*   BILIRUBIN mg/dL 0.3 0.2 0.3 0.2   ALK PHOS U/L 155* 168* 149* 145*   AST (SGOT) U/L 16 18 15 16   ALT (SGPT) U/L <5 12 16 11     Results from last 7 days   Lab Units 12/24/23  0616 12/23/23  0620 12/22/23  0431 12/21/23  0538   CALCIUM mg/dL 8.9 9.1 9.0 9.1   ALBUMIN g/dL 2.9* 3.1* 2.7* 3.0*       Glucose   Date/Time Value Ref Range Status   12/25/2023 1554 87 70 - 130 mg/dL Final   12/25/2023 1057 118 70 - 130 mg/dL Final   12/25/2023 0704 96 70 - 130 mg/dL Final   12/24/2023 2042 117 70 - 130 mg/dL Final   12/24/2023 1620 117 70 - 130 mg/dL Final   12/24/2023 1122 131 (H) 70 - 130 mg/dL Final   12/24/2023 0727 97 70 - 130 mg/dL Final       No radiology results for the last day    I have personally reviewed all medications:  Scheduled Medications  [Held by provider] apixaban, 5 mg, Oral, Q12H  apixaban, 5 mg, Oral, Q12H  atorvastatin, 20 mg,  Oral, Nightly  budesonide-formoterol, 2 puff, Inhalation, BID - RT  calcium carbonate (oyster shell), 500 mg, Oral, Daily  castor oil-balsam peru, 1 application , Topical, Q12H  cholecalciferol, 2,000 Units, Oral, Daily  DULoxetine, 60 mg, Oral, BID  famotidine, 40 mg, Oral, QAM  ferrous sulfate, 325 mg, Oral, BID With Meals  finasteride, 5 mg, Oral, Daily  folic acid, 1 mg, Oral, Daily  gabapentin, 300 mg, Oral, Q12H  hydrocortisone-bacitracin-zinc oxide-nystatin, 1 application , Topical, Q12H  insulin glargine, 50 Units, Subcutaneous, Daily  insulin lispro, 2-7 Units, Subcutaneous, 4x Daily AC & at Bedtime  insulin lispro, 8 Units, Subcutaneous, TID AC  magnesium oxide, 400 mg, Oral, Daily  metoprolol tartrate, 100 mg, Oral, BID  montelukast, 10 mg, Oral, Nightly  mupirocin, 1 application , Topical, Q12H  NIFEdipine XL, 30 mg, Oral, QAM  O2, 2 L/min, Inhalation, Once  senna-docusate sodium, 2 tablet, Oral, BID  sodium chloride, 10 mL, Intravenous, Q12H    Infusions   Diet  Diet: Regular/House Diet; Texture: Regular Texture (IDDSI 7); Fluid Consistency: Thin (IDDSI 0)    I have personally reviewed:  [x]  Laboratory   [x]  Microbiology   [x]  Radiology   [x]  EKG/Telemetry  [x]  Cardiology/Vascular   []  Pathology    []  Records      Assessment/Plan     Active Hospital Problems    Diagnosis  POA    **Closed fracture of left tibial plateau [S82.142A]  Yes    Essential hypertension [I10]  Yes    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       58 y.o. male admitted with Closed fracture of left tibial plateau.    Assessment and plan  1.  Closed fracture of left tibia plateau, management per Ortho.   Continue pain control and physical therapy.     2.  Hypertension, diabetes, COPD, chronic conditions.     3. Paroxysmal atrial fibrillation, patient is currently rate controlled.     4.  Morbid obesity, complicates aspects of care.     5.  Acute chronic kidney injury, nephrology on board.      6.  CODE STATUS is full code.  Further plans based on hospital course.      Shabbir Peraza MD  [facility]  23  18:14 EST      Electronically signed by Shabbir Peraza MD at 23 1815       Jose Bansal MD at 23 1411              [facility] Progress Note      Patient Name: Preston Wallis  : 1965  MRN: [id number]  Primary Care Physician:  Kimmy Riley MD  Date of admission: 2023    Subjective     Interval History:     Overnight event  Patient lying in bed comfortable on supplemental oxygen  Minimal discomfort on surgical wound  Tolerating oral intake  Doyle catheter in place with 2.6 L of urine output    No fevers or chills    Accompanied by wife and daughter at bedside    Review of Systems:   As noted above    Objective     Vitals:   Temp:  [97.7 °F (36.5 °C)-98.3 °F (36.8 °C)] 98.3 °F (36.8 °C)  Heart Rate:  [78-84] 84  Resp:  [16] 16  BP: (161-173)/(74-82) 173/82  Flow (L/min):  [2] 2    Intake/Output Summary (Last 24 hours) at 2023 1411  Last data filed at 2023 1340  Gross per 24 hour   Intake 840 ml   Output 2600 ml   Net -1760 ml       Physical Exam:      Constitutional: Awake, alert, no acute distress.  Morbid obese BMI 41.  Currently on nasal cannula  HEENT: Sclera anicteric, no conjunctival injection  Neck: Supple, no thyromegaly, no lymphadenopathy, trachea at midline, no JVD  Respiratory: Diffuse crackles bibasilar  Cardiovascular: RRR, systolic extra murmur  Gastrointestinal: Positive bowel sounds, abdomen is soft, nontender and nondistended  :   Doyle catheter in place  Musculoskeletal: Bilateral edema, no  clubbing or cyanosis.  Surgical wound on left tibial area  Psychiatric: Appropriate affect, cooperative  Neurologic: Oriented x3, moving all extremities, normal speech and mental status  Skin: Warm and dry        Scheduled Meds:     [Held by provider] apixaban, 5 mg, Oral, Q12H  apixaban, 5 mg, Oral, Q12H  atorvastatin, 20 mg, Oral, Nightly  budesonide-formoterol, 2 puff, Inhalation, BID - RT  calcium carbonate (oyster shell), 500 mg, Oral, Daily  castor oil-balsam peru, 1 application , Topical, Q12H  cholecalciferol, 2,000 Units, Oral, Daily  DULoxetine, 60 mg, Oral, BID  famotidine, 40 mg, Oral, QAM  ferrous sulfate, 325 mg, Oral, BID With Meals  finasteride, 5 mg, Oral, Daily  folic acid, 1 mg, Oral, Daily  gabapentin, 300 mg, Oral, Q12H  hydrocortisone-bacitracin-zinc oxide-nystatin, 1 application , Topical, Q12H  insulin glargine, 50 Units, Subcutaneous, Daily  insulin lispro, 2-7 Units, Subcutaneous, 4x Daily AC & at Bedtime  insulin lispro, 8 Units, Subcutaneous, TID AC  magnesium oxide, 400 mg, Oral, Daily  metoprolol tartrate, 100 mg, Oral, BID  montelukast, 10 mg, Oral, Nightly  mupirocin, 1 application , Topical, Q12H  NIFEdipine XL, 30 mg, Oral, QAM  O2, 2 L/min, Inhalation, Once  senna-docusate sodium, 2 tablet, Oral, BID  sodium chloride, 10 mL, Intravenous, Q12H      IV Meds:        Results Reviewed:   I have personally reviewed the results from the time of this admission to 12/25/2023 14:11 EST     Results from last 7 days   Lab Units 12/24/23  0616 12/23/23  0620 12/22/23  0431   SODIUM mmol/L 139 137 136   POTASSIUM mmol/L 4.3 4.7 4.5   CHLORIDE mmol/L 101 98 99   CO2 mmol/L 29.4* 29.9* 30.0*   BUN mg/dL 28* 26* 26*   CREATININE mg/dL 1.77* 1.43* 1.52*   CALCIUM mg/dL 8.9 9.1 9.0   BILIRUBIN mg/dL 0.3 0.2 0.3   ALK PHOS U/L 155* 168* 149*   ALT (SGPT) U/L <5 12 16   AST (SGOT) U/L 16 18 15   GLUCOSE mg/dL 102* 191* 110*       Estimated Creatinine Clearance: 60 mL/min (A) (by C-G formula based on  SCr of 1.77 mg/dL (H)).                Results from last 7 days   Lab Units 12/24/23  0616 12/23/23  0440 12/22/23  0431 12/21/23  0538 12/20/23  0516   WBC 10*3/mm3 10.24 9.25 10.28 10.22 10.52   HEMOGLOBIN g/dL 9.3* 10.0* 7.6* 8.1* 7.9*   PLATELETS 10*3/mm3 439 448 477* 430 375             Assessment / Plan     ASSESSMENT:    Chronic kidney disease stage IIIb.  Baseline creatinine 1.6-1.8 follow-up closely by Dr. Martínez , ( at [place], [place] )  secondary to diabetic nephropathy and subnephrotic proteinuria of 2.8 g associated with neurogenic bladder with chronic self bladder catheterization.  .  Currently renal function is at baseline     Neurogenic bladder with a chronic bladder catheterization currently Doyle catheter in place.  Catheter care by nursing staff     Proximal left tibia and fibula fractures on the left side. S/p  surgical repair on 12/22/2023 by orthopedics followed closely.      Diabetes Mellitus type 2 w/ complications ( , gastroparesis , neuropathy , nephropathy ) .Continue insulin regimen / hypoglycemic regimen .Hypoglycemic protocol . POC glucose 4 x day .Diabetic diet     Paroxysmal atrial fibrillation anticoagulated on apixaban and rate control metoprolol      Hypertension with CKD/currently on nifedipine and  metoprolol.  And heart healthy diet    PLAN:  Currently renal function at baseline electrolytes  and volume status stable  Will continue to follow closely with surveillance labs  Upon discharge patient can be followed closely as an outpatient       Thank you for involving us in the care of Preston Wallis.  Please feel free to call with any questions.    Jose Bansal MD  12/25/23  14:11 [facility]    [facility]  [phone]    Please note that portions of this note were completed with a voice recognition program.      Electronically signed by Jose Bansal MD at 12/25/23 1093       Jose Banasl MD at 12/24/23 9131            [facility]   [facility] Consult Note      Patient Name: Preston Wallis  : 1965  MRN: [id number]  Primary Care Physician:  Kimmy Riley MD  Referring Physician: No ref. provider found  Date of admission: 2023    Subjective     Reason for Consult:   CKD     HPI:     Mrs. Preston Wallis is a pleasant 58-year-old  male with morbid obesity BMI of 31, history of tobacco abuse with chronic obstructive pulmonary disease on chronic supplemental oxygen at 2 L/min, longstanding diabetes mellitus type 2 over 30 years with extensive complications including neuropathy gastroparesis and nephropathy with a neurogenic bladder requiring self bladder catheterization, congestive heart failure, chronic atrial fibrillation, hypertension, dyslipidemia, chronic spinal disc degenerative disease with pain syndrome    Patient was admitted for COPD exacerbation to outside facility [facility] found to have a left lower lung pneumonia with isolation of Achromobacter xylosoxidan .  She was transferred to [facility] [place] to continue medical care.  On 2023 and discharged on 2023     Unfortunately the patient sustained a fall upon discharge having a proximal left tibia and fibula fractures on the left side.  And require surgical repair on 2023    Nephrology consultation been requested for further management     Review of Systems:   14 point review of systems is otherwise negative except for mentioned above on HPI    Personal History     Past Medical History:   Diagnosis Date    Age-related cognitive decline     Allergic contact dermatitis     Allergies     Anemia     Bronchiectasis with acute lower respiratory infection     Charcot foot due to diabetes mellitus 9/10/2013    Chronic diastolic (congestive) heart failure     Chronic kidney disease     Chronic respiratory failure with hypoxia     Closed supracondylar fracture of femur 2022    COPD (chronic obstructive pulmonary disease)     Deep  vein thrombosis (DVT) of lower extremity associated with air travel 1/13/2023    Dependence on supplemental oxygen     Eczema     Erectile dysfunction     due to organic reasons    Essential (primary) hypertension     Fracture     closed fracture of other tarsal and metatarsal bones    Fracture of proximal humerus 1/13/2023    GERD without esophagitis     High risk medication use     Hypercholesteremia     Hypomagnesemia     Infected stasis ulcer of left lower extremity 1/13/2023    Insomnia     Low back pain     Major depressive disorder     Morbid (severe) obesity due to excess calories     MRSA pneumonia     Muscle weakness     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 without evidence of necrosis     Obstructive sleep apnea (adult) (pediatric)     Other forms of dyspnea     Other long term (current) drug therapy     Other specified noninfective gastroenteritis and colitis     Other spondylosis, lumbar region     Pain in both knees     Paroxysmal atrial fibrillation     Peripheral neuropathy     attributed to type 2 diabetes    Pneumonia, unspecified organism     Polyneuropathy     Rash and other nonspecific skin eruption     Syncope and collapse     Tachycardia     Tinnitus 1/13/2023    Type 1 diabetes mellitus with diabetic chronic kidney disease     Type 2 diabetes mellitus     Unspecified fall, initial encounter     Urinary retention        Past Surgical History:   Procedure Laterality Date    CHOLECYSTECTOMY      CYSTOSCOPY      FEMUR SURGERY Left     Shravan placed    KNEE SURGERY Left     OTHER SURGICAL HISTORY Left     venous port    TONSILLECTOMY AND ADENOIDECTOMY         Family History: family history includes Asthma in his father; Cancer in his sister; Coronary artery disease in his mother; Diabetes type II in his mother and sister; Hypertension in his mother.    Social History:  reports that he quit smoking about 30 years ago. His smoking use included cigarettes.  He started smoking about 43 years ago. He has a 12.00 pack-year smoking history. He has never used smokeless tobacco. He reports that he does not currently use alcohol. He reports that he does not use drugs.    Home Medications:  Prior to Admission medications    Medication Sig Start Date End Date Taking? Authorizing Provider   apixaban (Eliquis) 5 MG tablet tablet Take 1 tablet by mouth Every 12 (Twelve) Hours. 10/26/23  Yes Kimmy Riley MD   atorvastatin (LIPITOR) 20 MG tablet Take 1 tablet by mouth Every Night. 10/26/23  Yes Kimmy Riley MD   B-D UF III MINI PEN NEEDLES 31G X 5 MM misc USE FOUR TIMES DAILY BEFORE MEALS AND AT BEDTIME 9/12/23  Yes Neli Paredes APRN   Budeson-Glycopyrrol-Formoterol (Breztri Aerosphere) 160-9-4.8 MCG/ACT aerosol inhaler  3/20/23  Yes ProviderBreann MD   calcium citrate (CALCITRATE) 950 (200 Ca) MG tablet Take 1 tablet by mouth Daily. 10/26/23  Yes Kimmy Riley MD   Cholecalciferol (Vitamin D3) 50 MCG (2000 UT) tablet Take 1 tablet by mouth Daily. 10/26/23  Yes Kimmy Riley MD   dapagliflozin (Farxiga) 5 MG tablet tablet Take 1 tablet by mouth Daily. 10/26/23  Yes Kimmy Riley MD   docusate sodium (COLACE) 100 MG capsule Take 1 capsule by mouth Daily. 10/26/23  Yes Kimmy Riley MD   DULoxetine (CYMBALTA) 60 MG capsule Take 1 capsule by mouth 2 (Two) Times a Day. 10/26/23  Yes Kimmy Riley MD   famotidine (PEPCID) 40 MG tablet Take 1 tablet by mouth Every Morning. 10/26/23  Yes Kimmy Riley MD   ferrous gluconate (FERGON) 324 MG tablet Take 1 tablet by mouth Daily With Breakfast. 10/26/23  Yes Kimmy Riley MD   finasteride (PROSCAR) 5 MG tablet Take 1 tablet by mouth Daily. 10/26/23  Yes Kimmy Riley MD   Fluticasone-Salmeterol (ADVAIR/WIXELA) 500-50 MCG/ACT DISKUS Inhale 1 puff 2 (Two) Times a Day.   Yes Provider, MD Breann   folic acid (FOLVITE) 1 MG tablet Take 1 tablet by mouth  Daily. 10/26/23  Yes Kimmy Riley MD   gabapentin (NEURONTIN) 300 MG capsule TAKE ONE CAPSULE BY MOUTH EVERY MORNING AND TAKE TWO CAPSULES BY MOUTH EVERY NIGHT AT BEDTIME 10/26/23  Yes Kimmy Riley MD   Insulin Glargine (Lantus SoloStar) 100 UNIT/ML injection pen Inject 50 Units under the skin into the appropriate area as directed Daily. 12/14/23  Yes Deshawn Moser MD   Insulin Lispro, 1 Unit Dial, (HUMALOG) 100 UNIT/ML solution pen-injector inject EIGHT units UNDER THE SKIN INTO THE APPROPRIATE AREA THREE TIMES DAILY PER sliding scale, IF BLOOD SUGAR < 160= 0 units, 161-220= 2 units, 221-280= 4 units, 281-340 = SIX units, 341-400 = EIGHT units 11/9/23  Yes Neli Paredes APRN   magnesium oxide (MAG-OX) 400 tablet tablet Take 1 tablet by mouth Daily. Started by [facility]   Yes Breann Shukla MD   metoprolol tartrate (LOPRESSOR) 100 MG tablet Take 1 tablet by mouth 2 (Two) Times a Day. 10/26/23  Yes Kimmy Riley MD   montelukast (SINGULAIR) 10 MG tablet Take 1 tablet by mouth every night at bedtime. 10/26/23  Yes Kimmy Riley MD   NIFEdipine XL (PROCARDIA XL) 30 MG 24 hr tablet Take 1 tablet by mouth Every Morning. 10/26/23  Yes Kimmy Riley MD   O2 (OXYGEN) 2 Liter O2 - CONTINUOUS (route: Oxygen) 2/1/22  Yes ProviderBreann MD   ondansetron ODT (ZOFRAN-ODT) 4 MG disintegrating tablet PLACE 1 TABLET ON THE TONGUE EVERY 8 HOURS AS NEEDED FOR NAUSEA AND VOMITING 11/22/23  Yes Kimmy Riley MD   Semaglutide, 1 MG/DOSE, (Ozempic, 1 MG/DOSE,) 4 MG/3ML solution pen-injector Inject 1 mg under the skin into the appropriate area as directed 1 (One) Time Per Week. 11/10/23  Yes Neli Paredes APRN   Ventolin  (90 Base) MCG/ACT inhaler INHALE 2 PUFFS FOUR TIMES DAILY AS NEEDED FOR WHEEZING 7/13/23  Yes Betzaida Nelson APRN   Continuous Blood Gluc  (FreeStyle Bethany 2 Remer) device  1/17/23   Provider, MD Breann   Continuous Blood  Gluc Sensor (FreeStyle Bethany 2 Sensor) misc USE every 14 DAYS 11/8/23   Neli Paredes APRN   exenatide er (Bydureon BCise) 2 MG/0.85ML auto-injector injection Inject 0.85 mL under the skin into the appropriate area as directed 1 (One) Time Per Week.    Provider, MD Breann   glucose blood test strip 1 each by Other route Daily. Dx:   E11.40 1/25/23   Kimmy Riley MD   TRUEplus Lancets 28G misc USE AS DIRECTED 7/29/21   Kimmy Riley MD       Allergies:  Allergies   Allergen Reactions    Benadryl [Diphenhydramine] Itching    Proventil [Albuterol] Other (See Comments)     Mouth sores         Objective     Vitals:   Temp:  [97.4 °F (36.3 °C)-98.2 °F (36.8 °C)] 98.2 °F (36.8 °C)  Heart Rate:  [76-90] 90  Resp:  [16-18] 16  BP: (143-158)/(55-79) 143/79  Flow (L/min):  [2] 2    Intake/Output Summary (Last 24 hours) at 12/24/2023 1559  Last data filed at 12/24/2023 1347  Gross per 24 hour   Intake 900 ml   Output 2550 ml   Net -1650 ml       Physical Exam:   Constitutional: Awake, alert, no acute distress.  Morbid obese BMI 41.  Currently on nasal cannula  HEENT: Sclera anicteric, no conjunctival injection  Neck: Supple, no thyromegaly, no lymphadenopathy, trachea at midline, no JVD  Respiratory: Diffuse crackles bibasilar  Cardiovascular: RRR, systolic extra murmur  Gastrointestinal: Positive bowel sounds, abdomen is soft, nontender and nondistended  :   Doyle catheter in place  Musculoskeletal: Bilateral edema, no clubbing or cyanosis  Psychiatric: Appropriate affect, cooperative  Neurologic: Oriented x3, moving all extremities, normal speech and mental status  Skin: Warm and dry       Scheduled Meds:     [Held by provider] apixaban, 5 mg, Oral, Q12H  apixaban, 5 mg, Oral, Q12H  atorvastatin, 20 mg, Oral, Nightly  budesonide-formoterol, 2 puff, Inhalation, BID - RT  calcium carbonate (oyster shell), 500 mg, Oral, Daily  castor oil-balsam peru, 1 application , Topical, Q12H  cholecalciferol,  2,000 Units, Oral, Daily  DULoxetine, 60 mg, Oral, BID  famotidine, 40 mg, Oral, QAM  ferrous sulfate, 325 mg, Oral, BID With Meals  finasteride, 5 mg, Oral, Daily  folic acid, 1 mg, Oral, Daily  gabapentin, 300 mg, Oral, Q12H  hydrocortisone-bacitracin-zinc oxide-nystatin, 1 application , Topical, Q12H  insulin glargine, 50 Units, Subcutaneous, Daily  insulin lispro, 2-7 Units, Subcutaneous, 4x Daily AC & at Bedtime  insulin lispro, 8 Units, Subcutaneous, TID AC  magnesium oxide, 400 mg, Oral, Daily  metoprolol tartrate, 100 mg, Oral, BID  montelukast, 10 mg, Oral, Nightly  mupirocin, 1 application , Topical, Q12H  NIFEdipine XL, 30 mg, Oral, QAM  O2, 2 L/min, Inhalation, Once  senna-docusate sodium, 2 tablet, Oral, BID  sodium chloride, 10 mL, Intravenous, Q12H      IV Meds:        Results Reviewed:   I have personally reviewed the results from the time of this admission to 12/24/2023 15:59 EST     Lab Results   Component Value Date    GLUCOSE 102 (H) 12/24/2023    CALCIUM 8.9 12/24/2023     12/24/2023    K 4.3 12/24/2023    CO2 29.4 (H) 12/24/2023     12/24/2023    BUN 28 (H) 12/24/2023    CREATININE 1.77 (H) 12/24/2023    EGFRIFNONA 46 (L) 07/27/2021    BCR 15.8 12/24/2023    ANIONGAP 8.6 12/24/2023      Lab Results   Component Value Date    MG 1.9 12/14/2023    PHOS 3.8 12/14/2023    ALBUMIN 2.9 (L) 12/24/2023           Assessment / Plan       Closed fracture of left tibial plateau    Polyneuropathy    Paroxysmal atrial fibrillation    Obstructive sleep apnea    Chronic diastolic heart failure    Chronic obstructive pulmonary disease    Type 2 DM with CKD stage 3 and hypertension    Essential hypertension      ASSESSMENT:    Chronic kidney disease stage IIIb.  Baseline creatinine 1.6-1.8 follow-up closely by Dr. Martínez , secondary to diabetic nephropathy and subnephrotic proteinuria of 2.8 g associated with neurogenic bladder with chronic self bladder catheterization.  .  Currently renal function is  at baseline    Neurogenic bladder with a chronic bladder catheterization currently Doyle catheter in place.  Catheter care by nursing staff    proximal left tibia and fibula fractures on the left side. S/p  surgical repair on 12/22/2023 by orthopedics followed closely.     Diabetes Mellitus type 2 w/ complications ( , gastroparesis , neuropathy , nephropathy ) .Continue insulin regimen / hypoglycemic regimen .Hypoglycemic protocol . POC glucose 4 x day .Diabetic diet    Paroxysmal atrial fibrillation anticoagulated on apixaban and rate control metoprolol     Hypertension with CKD/currently on nifedipine metoprolol.  And heart healthy diet    PLAN:  -Currently renal function at baseline electrolytes  and volume status stable  -Will continue to follow closely with surveillance labs      Thank you for involving us in the care of Preston Wallis.  Please feel free to call with any questions.    Jose Bansal MD  12/24/23  15:59 [facility]    [facility]  [phone]      Please note that portions of this note were completed with a voice recognition program.      Electronically signed by Jose Bansal MD at 12/24/23 1607       Consult Notes (last 48 hours)  Notes from 12/24/23 1527 through 12/26/23 1527   No notes of this type exist for this encounter.

## 2023-12-26 NOTE — PLAN OF CARE
Goal Outcome Evaluation:  Plan of Care Reviewed With: patient        Progress: improving  Outcome Evaluation: Patient pleasant and agreeable to PT this morning. Reviewed NWBing status of L LE. SBA for bed mobility and completed bed<>chair transfer requiring mod-maxAx2 and use of RW for UE support. Second STS completed in front of recliner and able to maintain NWBing status for 15s with use of RW for UE support. PT will continue to follow and advance as able. Continue to recommend SNF upon DC.      Anticipated Discharge Disposition (PT): skilled nursing facility

## 2023-12-26 NOTE — DISCHARGE PLACEMENT REQUEST
"Preston Wallis \"Gómez\" (58 y.o. Male)       Date of Birth   1965    Social Security Number       Address   [address] [place] [address]    Home Phone       MRN   [id number]       Gnosticist   Spiritism    Marital Status                               Admission Date   12/14/23    Admission Type   Urgent    Admitting Provider   Yohana Anaya MD    Attending Provider   Shabbir Peraza MD    Department, Room/Bed   [address], P793/1       Discharge Date       Discharge Disposition       Discharge Destination                                 Attending Provider: Shabbir Peraza MD    Allergies: Benadryl [Diphenhydramine], Proventil [Albuterol]    Isolation: None   Infection: MRSA/History Only (12/15/23)   Code Status: CPR    Ht: 175.3 cm (69\")   Wt: 127 kg (280 lb)    Admission Cmt: None   Principal Problem: Closed fracture of left tibial plateau [S82.142A]                   Active Insurance as of 12/14/2023       Primary Coverage       Payor Plan Insurance Group Employer/Plan Group    [facility] BY YING [facility] BY YING [id number]       Payor Plan Address Payor Plan Phone Number Payor Plan Fax Number Effective Dates    [address] [address]   7/1/2022 - None Entered    [facility] [address]         Subscriber Name Subscriber Birth Date Member ID       PRESTON WALLIS 1965 [id number]                     Emergency Contacts        (Rel.) Home Phone Work Phone Mobile Phone    Kami Wallis (Spouse) [phone]    Elaine Zaldivar (Daughter) -- -- [phone]              "

## 2023-12-26 NOTE — PROGRESS NOTES
Name: Preston Wallis ADMIT: 2023   : 1965  PCP: Kimmy Riley MD    MRN: [id number] LOS: 5 days   AGE/SEX: 58 y.o. male  ROOM: [facility]     Subjective   Subjective   Patient is seen at bedside, no new complaints.       Objective   Objective   Vital Signs  Temp:  [97.8 °F (36.6 °C)-98.7 °F (37.1 °C)] 98.4 °F (36.9 °C)  Heart Rate:  [73-86] 78  Resp:  [16-20] 16  BP: (148-188)/(77-94) 188/94  SpO2:  [96 %-99 %] 99 %  on  Flow (L/min):  [2] 2;   Device (Oxygen Therapy): nasal cannula  Body mass index is 41.35 kg/m².  Physical Exam  General, awake and alert.  Head and ENT, normocephalic and atraumatic.  Lungs, symmetric expansion, equal air entry bilaterally.  Heart, regular rate and rhythm.  Abdomen, soft and nontender.  Extremities, no clubbing or cyanosis.  Neuro, no focal deficits.  Skin: Warm and no rash.  Psych, normal mood and affect.  Musculoskeletal, joint examination is grossly normal.             Copied text material from yesterday's note has been reviewed for appropriate changes and remains accurate as of 23.        Results Review     I reviewed the patient's new clinical results.  Results from last 7 days   Lab Units 23  0541 23  0616 23  0440 23  0431   WBC 10*3/mm3 9.86 10.24 9.25 10.28   HEMOGLOBIN g/dL 9.7* 9.3* 10.0* 7.6*   PLATELETS 10*3/mm3 498* 439 448 477*     Results from last 7 days   Lab Units 23  0541 23  0616 23  0620 23  0431   SODIUM mmol/L 141 139 137 136   POTASSIUM mmol/L 4.3 4.3 4.7 4.5   CHLORIDE mmol/L 102 101 98 99   CO2 mmol/L 29.2* 29.4* 29.9* 30.0*   BUN mg/dL 29* 28* 26* 26*   CREATININE mg/dL 1.48* 1.77* 1.43* 1.52*   GLUCOSE mg/dL 81 102* 191* 110*   EGFR mL/min/1.73 54.5* 44.0* 56.8* 52.8*     Results from last 7 days   Lab Units 23  0541 23  0616 23  0620 23  0431 23  0538   ALBUMIN g/dL 3.0* 2.9* 3.1* 2.7* 3.0*   BILIRUBIN mg/dL  --  0.3 0.2 0.3 0.2   ALK PHOS U/L  --   155* 168* 149* 145*   AST (SGOT) U/L  --  16 18 15 16   ALT (SGPT) U/L  --  <5 12 16 11     Results from last 7 days   Lab Units 12/26/23  0541 12/24/23  0616 12/23/23  0620 12/22/23  0431   CALCIUM mg/dL 9.0 8.9 9.1 9.0   ALBUMIN g/dL 3.0* 2.9* 3.1* 2.7*   PHOSPHORUS mg/dL 2.7  --   --   --        Glucose   Date/Time Value Ref Range Status   12/26/2023 1118 182 (H) 70 - 130 mg/dL Final   12/26/2023 0717 87 70 - 130 mg/dL Final   12/25/2023 2052 101 70 - 130 mg/dL Final   12/25/2023 1554 87 70 - 130 mg/dL Final   12/25/2023 1057 118 70 - 130 mg/dL Final   12/25/2023 0704 96 70 - 130 mg/dL Final   12/24/2023 2042 117 70 - 130 mg/dL Final       No radiology results for the last day    I have personally reviewed all medications:  Scheduled Medications  apixaban, 5 mg, Oral, Q12H  atorvastatin, 20 mg, Oral, Nightly  budesonide-formoterol, 2 puff, Inhalation, BID - RT  calcium carbonate (oyster shell), 500 mg, Oral, Daily  carvedilol, 25 mg, Oral, Q12H  castor oil-balsam peru, 1 application , Topical, Q12H  cholecalciferol, 2,000 Units, Oral, Daily  DULoxetine, 60 mg, Oral, BID  famotidine, 40 mg, Oral, QAM  ferrous sulfate, 325 mg, Oral, BID With Meals  finasteride, 5 mg, Oral, Daily  folic acid, 1 mg, Oral, Daily  gabapentin, 300 mg, Oral, Q12H  hydrocortisone-bacitracin-zinc oxide-nystatin, 1 application , Topical, Q12H  insulin glargine, 50 Units, Subcutaneous, Daily  insulin lispro, 2-7 Units, Subcutaneous, 4x Daily AC & at Bedtime  insulin lispro, 8 Units, Subcutaneous, TID AC  losartan, 25 mg, Oral, Q24H  magnesium oxide, 400 mg, Oral, Daily  montelukast, 10 mg, Oral, Nightly  mupirocin, 1 application , Topical, Q12H  NIFEdipine XL, 30 mg, Oral, QAM  O2, 2 L/min, Inhalation, Once  senna-docusate sodium, 2 tablet, Oral, BID  sodium chloride, 10 mL, Intravenous, Q12H    Infusions   Diet  Diet: Regular/House Diet; Texture: Regular Texture (IDDSI 7); Fluid Consistency: Thin (IDDSI 0)    I have personally  reviewed:  [x]  Laboratory   [x]  Microbiology   [x]  Radiology   [x]  EKG/Telemetry  [x]  Cardiology/Vascular   []  Pathology    []  Records       Assessment/Plan     Active Hospital Problems    Diagnosis  POA    **Closed fracture of left tibial plateau [S82.142A]  Yes    Essential hypertension [I10]  Yes    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       58 y.o. male admitted with Closed fracture of left tibial plateau.    Assessment and plan  1.  Closed fracture of left tibia plateau, management per Ortho.  Continue pain control and physical therapy.     2.  Hypertension, diabetes, COPD, chronic conditions.     3. Paroxysmal atrial fibrillation, patient is currently rate controlled.     4.  Morbid obesity, complicates aspects of care.     5.  Acute chronic kidney injury, nephrology on board.      6.  CODE STATUS is full code.  Further plans based on hospital course.      Shabbir Peraza MD  [facility]  12/26/23  15:55 EST

## 2023-12-27 LAB
ALBUMIN SERPL-MCNC: 2.8 G/DL (ref 3.5–5.2)
ANION GAP SERPL CALCULATED.3IONS-SCNC: 8.8 MMOL/L (ref 5–15)
BASOPHILS # BLD AUTO: 0.08 10*3/MM3 (ref 0–0.2)
BASOPHILS NFR BLD AUTO: 0.7 % (ref 0–1.5)
BUN SERPL-MCNC: 37 MG/DL (ref 6–20)
BUN/CREAT SERPL: 18.5 (ref 7–25)
CALCIUM SPEC-SCNC: 9.1 MG/DL (ref 8.6–10.5)
CHLORIDE SERPL-SCNC: 96 MMOL/L (ref 98–107)
CO2 SERPL-SCNC: 30.2 MMOL/L (ref 22–29)
CREAT SERPL-MCNC: 2 MG/DL (ref 0.76–1.27)
DEPRECATED RDW RBC AUTO: 42.9 FL (ref 37–54)
EGFRCR SERPLBLD CKD-EPI 2021: 38 ML/MIN/1.73
EOSINOPHIL # BLD AUTO: 1.22 10*3/MM3 (ref 0–0.4)
EOSINOPHIL NFR BLD AUTO: 10.1 % (ref 0.3–6.2)
ERYTHROCYTE [DISTWIDTH] IN BLOOD BY AUTOMATED COUNT: 13.3 % (ref 12.3–15.4)
GLUCOSE BLDC GLUCOMTR-MCNC: 158 MG/DL (ref 70–130)
GLUCOSE BLDC GLUCOMTR-MCNC: 158 MG/DL (ref 70–130)
GLUCOSE BLDC GLUCOMTR-MCNC: 178 MG/DL (ref 70–130)
GLUCOSE BLDC GLUCOMTR-MCNC: 180 MG/DL (ref 70–130)
GLUCOSE SERPL-MCNC: 143 MG/DL (ref 65–99)
HCT VFR BLD AUTO: 28.6 % (ref 37.5–51)
HGB BLD-MCNC: 9.2 G/DL (ref 13–17.7)
IMM GRANULOCYTES # BLD AUTO: 0.08 10*3/MM3 (ref 0–0.05)
IMM GRANULOCYTES NFR BLD AUTO: 0.7 % (ref 0–0.5)
LYMPHOCYTES # BLD AUTO: 2.65 10*3/MM3 (ref 0.7–3.1)
LYMPHOCYTES NFR BLD AUTO: 22 % (ref 19.6–45.3)
MCH RBC QN AUTO: 28.3 PG (ref 26.6–33)
MCHC RBC AUTO-ENTMCNC: 32.2 G/DL (ref 31.5–35.7)
MCV RBC AUTO: 88 FL (ref 79–97)
MONOCYTES # BLD AUTO: 1.81 10*3/MM3 (ref 0.1–0.9)
MONOCYTES NFR BLD AUTO: 15 % (ref 5–12)
NEUTROPHILS NFR BLD AUTO: 51.5 % (ref 42.7–76)
NEUTROPHILS NFR BLD AUTO: 6.2 10*3/MM3 (ref 1.7–7)
NRBC BLD AUTO-RTO: 0 /100 WBC (ref 0–0.2)
PHOSPHATE SERPL-MCNC: 3.3 MG/DL (ref 2.5–4.5)
PLATELET # BLD AUTO: 466 10*3/MM3 (ref 140–450)
PMV BLD AUTO: 8.5 FL (ref 6–12)
POTASSIUM SERPL-SCNC: 4.5 MMOL/L (ref 3.5–5.2)
RBC # BLD AUTO: 3.25 10*6/MM3 (ref 4.14–5.8)
SODIUM SERPL-SCNC: 135 MMOL/L (ref 136–145)
WBC NRBC COR # BLD AUTO: 12.04 10*3/MM3 (ref 3.4–10.8)

## 2023-12-27 PROCEDURE — 94664 DEMO&/EVAL PT USE INHALER: CPT

## 2023-12-27 PROCEDURE — 63710000001 INSULIN GLARGINE PER 5 UNITS: Performed by: ORTHOPAEDIC SURGERY

## 2023-12-27 PROCEDURE — 94761 N-INVAS EAR/PLS OXIMETRY MLT: CPT

## 2023-12-27 PROCEDURE — 85025 COMPLETE CBC W/AUTO DIFF WBC: CPT | Performed by: INTERNAL MEDICINE

## 2023-12-27 PROCEDURE — 82948 REAGENT STRIP/BLOOD GLUCOSE: CPT

## 2023-12-27 PROCEDURE — 94760 N-INVAS EAR/PLS OXIMETRY 1: CPT

## 2023-12-27 PROCEDURE — 80069 RENAL FUNCTION PANEL: CPT | Performed by: INTERNAL MEDICINE

## 2023-12-27 PROCEDURE — 63710000001 INSULIN LISPRO (HUMAN) PER 5 UNITS: Performed by: ORTHOPAEDIC SURGERY

## 2023-12-27 PROCEDURE — 94799 UNLISTED PULMONARY SVC/PX: CPT

## 2023-12-27 RX ADMIN — ATORVASTATIN CALCIUM 20 MG: 20 TABLET, FILM COATED ORAL at 20:46

## 2023-12-27 RX ADMIN — DULOXETINE HYDROCHLORIDE 60 MG: 60 CAPSULE, DELAYED RELEASE ORAL at 20:45

## 2023-12-27 RX ADMIN — ZINC OXIDE 1 APPLICATION: 200 OINTMENT TOPICAL at 11:15

## 2023-12-27 RX ADMIN — MUPIROCIN 1 APPLICATION: 20 OINTMENT TOPICAL at 11:15

## 2023-12-27 RX ADMIN — CARVEDILOL 25 MG: 25 TABLET, FILM COATED ORAL at 08:34

## 2023-12-27 RX ADMIN — SENNOSIDES AND DOCUSATE SODIUM 2 TABLET: 50; 8.6 TABLET ORAL at 08:34

## 2023-12-27 RX ADMIN — INSULIN LISPRO 2 UNITS: 100 INJECTION, SOLUTION INTRAVENOUS; SUBCUTANEOUS at 08:35

## 2023-12-27 RX ADMIN — LOSARTAN POTASSIUM 25 MG: 25 TABLET, FILM COATED ORAL at 08:34

## 2023-12-27 RX ADMIN — DULOXETINE HYDROCHLORIDE 60 MG: 60 CAPSULE, DELAYED RELEASE ORAL at 08:34

## 2023-12-27 RX ADMIN — INSULIN LISPRO 2 UNITS: 100 INJECTION, SOLUTION INTRAVENOUS; SUBCUTANEOUS at 16:47

## 2023-12-27 RX ADMIN — FOLIC ACID 1 MG: 1 TABLET ORAL at 08:34

## 2023-12-27 RX ADMIN — CASTOR OIL AND BALSAM, PERU 1 APPLICATION: 788; 87 OINTMENT TOPICAL at 20:52

## 2023-12-27 RX ADMIN — MUPIROCIN 1 APPLICATION: 20 OINTMENT TOPICAL at 20:57

## 2023-12-27 RX ADMIN — CASTOR OIL AND BALSAM, PERU 1 APPLICATION: 788; 87 OINTMENT TOPICAL at 11:15

## 2023-12-27 RX ADMIN — INSULIN LISPRO 8 UNITS: 100 INJECTION, SOLUTION INTRAVENOUS; SUBCUTANEOUS at 12:00

## 2023-12-27 RX ADMIN — GABAPENTIN 300 MG: 300 CAPSULE ORAL at 20:45

## 2023-12-27 RX ADMIN — BUDESONIDE AND FORMOTEROL FUMARATE DIHYDRATE 2 PUFF: 160; 4.5 AEROSOL RESPIRATORY (INHALATION) at 19:32

## 2023-12-27 RX ADMIN — Medication 10 ML: at 08:35

## 2023-12-27 RX ADMIN — INSULIN LISPRO 2 UNITS: 100 INJECTION, SOLUTION INTRAVENOUS; SUBCUTANEOUS at 23:04

## 2023-12-27 RX ADMIN — INSULIN GLARGINE 50 UNITS: 100 INJECTION, SOLUTION SUBCUTANEOUS at 08:36

## 2023-12-27 RX ADMIN — BUDESONIDE AND FORMOTEROL FUMARATE DIHYDRATE 2 PUFF: 160; 4.5 AEROSOL RESPIRATORY (INHALATION) at 08:17

## 2023-12-27 RX ADMIN — INSULIN LISPRO 8 UNITS: 100 INJECTION, SOLUTION INTRAVENOUS; SUBCUTANEOUS at 08:35

## 2023-12-27 RX ADMIN — SENNOSIDES AND DOCUSATE SODIUM 2 TABLET: 50; 8.6 TABLET ORAL at 20:46

## 2023-12-27 RX ADMIN — CARVEDILOL 25 MG: 25 TABLET, FILM COATED ORAL at 20:46

## 2023-12-27 RX ADMIN — APIXABAN 5 MG: 5 TABLET, FILM COATED ORAL at 08:35

## 2023-12-27 RX ADMIN — ACETAMINOPHEN 650 MG: 325 TABLET, FILM COATED ORAL at 15:53

## 2023-12-27 RX ADMIN — MAGNESIUM OXIDE 400 MG (241.3 MG MAGNESIUM) TABLET 400 MG: TABLET at 08:34

## 2023-12-27 RX ADMIN — MONTELUKAST SODIUM 10 MG: 10 TABLET, FILM COATED ORAL at 20:45

## 2023-12-27 RX ADMIN — FERROUS SULFATE TAB 325 MG (65 MG ELEMENTAL FE) 325 MG: 325 (65 FE) TAB at 17:54

## 2023-12-27 RX ADMIN — FINASTERIDE 5 MG: 5 TABLET, FILM COATED ORAL at 08:34

## 2023-12-27 RX ADMIN — INSULIN LISPRO 2 UNITS: 100 INJECTION, SOLUTION INTRAVENOUS; SUBCUTANEOUS at 12:00

## 2023-12-27 RX ADMIN — FAMOTIDINE 40 MG: 20 TABLET ORAL at 06:17

## 2023-12-27 RX ADMIN — NIFEDIPINE 30 MG: 30 TABLET, FILM COATED, EXTENDED RELEASE ORAL at 06:17

## 2023-12-27 RX ADMIN — GABAPENTIN 300 MG: 300 CAPSULE ORAL at 08:38

## 2023-12-27 RX ADMIN — FERROUS SULFATE TAB 325 MG (65 MG ELEMENTAL FE) 325 MG: 325 (65 FE) TAB at 08:34

## 2023-12-27 RX ADMIN — Medication 2000 UNITS: at 08:34

## 2023-12-27 RX ADMIN — INSULIN LISPRO 8 UNITS: 100 INJECTION, SOLUTION INTRAVENOUS; SUBCUTANEOUS at 16:47

## 2023-12-27 RX ADMIN — Medication 500 MG: at 08:34

## 2023-12-27 RX ADMIN — APIXABAN 5 MG: 5 TABLET, FILM COATED ORAL at 20:46

## 2023-12-27 RX ADMIN — Medication 10 ML: at 20:47

## 2023-12-27 RX ADMIN — ZINC OXIDE 1 APPLICATION: 200 OINTMENT TOPICAL at 20:57

## 2023-12-27 NOTE — PROGRESS NOTES
Name: Preston Wallis ADMIT: 2023   : 1965  PCP: Kimmy Riley MD    MRN: [id number] LOS: 6 days   AGE/SEX: 58 y.o. male  ROOM: [facility]     Subjective   Subjective   Patient is seen at bedside, no new complaints.       Objective   Objective   Vital Signs  Temp:  [97.5 °F (36.4 °C)-98.7 °F (37.1 °C)] 97.5 °F (36.4 °C)  Heart Rate:  [74-84] 82  Resp:  [16-20] 16  BP: (117-176)/(73-89) 117/73  SpO2:  [96 %-98 %] 96 %  on  Flow (L/min):  [2] 2;   Device (Oxygen Therapy): nasal cannula  Body mass index is 41.35 kg/m².  Physical Exam  General, awake and alert.  Head and ENT, normocephalic and atraumatic.  Lungs, symmetric expansion, equal air entry bilaterally.  Heart, regular rate and rhythm.  Abdomen, soft and nontender.  Extremities, no clubbing or cyanosis.  Neuro, no focal deficits.  Skin: Warm and no rash.  Psych, normal mood and affect.  Musculoskeletal, joint examination is grossly normal.             Copied text material from yesterday's note has been reviewed for appropriate changes and remains accurate as of 23.      Results Review     I reviewed the patient's new clinical results.  Results from last 7 days   Lab Units 23  04223  0541 23  0616 23  0440   WBC 10*3/mm3 12.04* 9.86 10.24 9.25   HEMOGLOBIN g/dL 9.2* 9.7* 9.3* 10.0*   PLATELETS 10*3/mm3 466* 498* 439 448     Results from last 7 days   Lab Units 23  0425 23  0541 23  0616 23  0620   SODIUM mmol/L 135* 141 139 137   POTASSIUM mmol/L 4.5 4.3 4.3 4.7   CHLORIDE mmol/L 96* 102 101 98   CO2 mmol/L 30.2* 29.2* 29.4* 29.9*   BUN mg/dL 37* 29* 28* 26*   CREATININE mg/dL 2.00* 1.48* 1.77* 1.43*   GLUCOSE mg/dL 143* 81 102* 191*   EGFR mL/min/1.73 38.0* 54.5* 44.0* 56.8*     Results from last 7 days   Lab Units 23  0425 23  0541 23  0616 23  0620 23  0431 23  0538   ALBUMIN g/dL 2.8* 3.0* 2.9* 3.1* 2.7* 3.0*   BILIRUBIN mg/dL  --   --  0.3 0.2 0.3  0.2   ALK PHOS U/L  --   --  155* 168* 149* 145*   AST (SGOT) U/L  --   --  16 18 15 16   ALT (SGPT) U/L  --   --  <5 12 16 11     Results from last 7 days   Lab Units 12/27/23  0425 12/26/23  0541 12/24/23  0616 12/23/23  0620   CALCIUM mg/dL 9.1 9.0 8.9 9.1   ALBUMIN g/dL 2.8* 3.0* 2.9* 3.1*   PHOSPHORUS mg/dL 3.3 2.7  --   --        Glucose   Date/Time Value Ref Range Status   12/27/2023 1618 158 (H) 70 - 130 mg/dL Final   12/27/2023 1117 180 (H) 70 - 130 mg/dL Final   12/27/2023 0737 158 (H) 70 - 130 mg/dL Final   12/26/2023 2104 223 (H) 70 - 130 mg/dL Final   12/26/2023 1612 154 (H) 70 - 130 mg/dL Final   12/26/2023 1118 182 (H) 70 - 130 mg/dL Final   12/26/2023 0717 87 70 - 130 mg/dL Final       No radiology results for the last day    I have personally reviewed all medications:  Scheduled Medications  apixaban, 5 mg, Oral, Q12H  atorvastatin, 20 mg, Oral, Nightly  budesonide-formoterol, 2 puff, Inhalation, BID - RT  calcium carbonate (oyster shell), 500 mg, Oral, Daily  carvedilol, 25 mg, Oral, Q12H  castor oil-balsam peru, 1 application , Topical, Q12H  cholecalciferol, 2,000 Units, Oral, Daily  DULoxetine, 60 mg, Oral, BID  famotidine, 40 mg, Oral, QAM  ferrous sulfate, 325 mg, Oral, BID With Meals  finasteride, 5 mg, Oral, Daily  folic acid, 1 mg, Oral, Daily  gabapentin, 300 mg, Oral, Q12H  hydrocortisone-bacitracin-zinc oxide-nystatin, 1 application , Topical, Q12H  insulin glargine, 50 Units, Subcutaneous, Daily  insulin lispro, 2-7 Units, Subcutaneous, 4x Daily AC & at Bedtime  insulin lispro, 8 Units, Subcutaneous, TID AC  magnesium oxide, 400 mg, Oral, Daily  montelukast, 10 mg, Oral, Nightly  mupirocin, 1 application , Topical, Q12H  NIFEdipine XL, 30 mg, Oral, QAM  O2, 2 L/min, Inhalation, Once  senna-docusate sodium, 2 tablet, Oral, BID  sodium chloride, 10 mL, Intravenous, Q12H    Infusions   Diet  Diet: Regular/House Diet; Texture: Regular Texture (IDDSI 7); Fluid Consistency: Thin (IDDSI  0)    I have personally reviewed:  [x]  Laboratory   [x]  Microbiology   [x]  Radiology   [x]  EKG/Telemetry  [x]  Cardiology/Vascular   []  Pathology    []  Records       Assessment/Plan     Active Hospital Problems    Diagnosis  POA    **Closed fracture of left tibial plateau [S82.142A]  Yes    Essential hypertension [I10]  Yes    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       58 y.o. male admitted with Closed fracture of left tibial plateau.    Assessment and plan  1.  Closed fracture of left tibia plateau, management per Ortho.  Continue pain control and physical therapy.     2.  Hypertension, diabetes, COPD, chronic conditions.     3. Paroxysmal atrial fibrillation, patient is currently rate controlled.     4.  Morbid obesity, complicates aspects of care.     5.  Acute chronic kidney injury, nephrology on board.      6.  CODE STATUS is full code.  Further plans based on hospital course.         Shabbir Peraza MD  [facility]  12/27/23  17:21 EST

## 2023-12-27 NOTE — PROGRESS NOTES
Nutrition Services    Patient Name:  Preston Wallis  YOB: 1965  MRN: [id number]  Admit Date:  12/14/2023  Assessment Date:  12/27/23    Summary: Nutrition screen for skin integrity  This is a 59 yo male who was admitted after a fall and sustained a Closed fracture of left tibial plateau. He is now s/p left tibial plateau open reduction internal fixation.    Labs na 135, glu 180, BUN 37, cr 2.0  Skin: incision, left heel pressure injury, right medial abd abscess, left gluteal wound, left leg wound  He states appetite is good, eating 100% most meals.  Encouraged good protein intake. Pt likes yogurt and  eggs for breakfast.    Plan/Recommendations  Good protein intake encouraged  RD to follow.    CLINICAL NUTRITION ASSESSMENT      Reason for Assessment Pressure Injury and/or Non-Healing Wound     Diagnosis/Problem   Closed fracture of left tibial plateau. He is now s/p left tibial plateau open reduction internal fixation.     Medical/Surgical History Past Medical History:   Diagnosis Date    Age-related cognitive decline     Allergic contact dermatitis     Allergies     Anemia     Bronchiectasis with acute lower respiratory infection     Charcot foot due to diabetes mellitus 9/10/2013    Chronic diastolic (congestive) heart failure     Chronic kidney disease     Chronic respiratory failure with hypoxia     Closed supracondylar fracture of femur 1/12/2022    COPD (chronic obstructive pulmonary disease)     Deep vein thrombosis (DVT) of lower extremity associated with air travel 1/13/2023    Dependence on supplemental oxygen     Eczema     Erectile dysfunction     due to organic reasons    Essential (primary) hypertension     Fracture     closed fracture of other tarsal and metatarsal bones    Fracture of proximal humerus 1/13/2023    GERD without esophagitis     High risk medication use     Hypercholesteremia     Hypomagnesemia     Infected stasis ulcer of left lower extremity 1/13/2023    Insomnia     Low  "back pain     Major depressive disorder     Morbid (severe) obesity due to excess calories     MRSA pneumonia     Muscle weakness     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 without evidence of necrosis     Obstructive sleep apnea (adult) (pediatric)     Other forms of dyspnea     Other long term (current) drug therapy     Other specified noninfective gastroenteritis and colitis     Other spondylosis, lumbar region     Pain in both knees     Paroxysmal atrial fibrillation     Peripheral neuropathy     attributed to type 2 diabetes    Pneumonia, unspecified organism     Polyneuropathy     Rash and other nonspecific skin eruption     Syncope and collapse     Tachycardia     Tinnitus 1/13/2023    Type 1 diabetes mellitus with diabetic chronic kidney disease     Type 2 diabetes mellitus     Unspecified fall, initial encounter     Urinary retention        Past Surgical History:   Procedure Laterality Date    CHOLECYSTECTOMY      CYSTOSCOPY      FEMUR SURGERY Left     Shravan placed    KNEE SURGERY Left     OTHER SURGICAL HISTORY Left     venous port    TIBIAL PLATEAU OPEN REDUCTION INTERNAL FIXATION Left 12/22/2023    Procedure: TIBIAL PLATEAU OPEN REDUCTION INTERNAL FIXATION;  Surgeon: Hugo Kline MD;  Location: [facility];  Service: Orthopedics;  Laterality: Left;    TONSILLECTOMY AND ADENOIDECTOMY          Anthropometrics        Current Height  Current Weight  BMI kg/m2 Height: 175.3 cm (69\")  Weight: 127 kg (280 lb) (12/15/23 0330)  Body mass index is 41.35 kg/m².   Adjusted BMI (if applicable)    BMI Category Obese, Class III (40 or higher)   Ideal Body Weight (IBW) 155   Usual Body Weight (UBW) 280   Weight Trend Stable   Weight History Wt Readings from Last 30 Encounters:   12/15/23 0330 127 kg (280 lb)   12/14/23 1400 126 kg (277 lb)   12/06/23 1904 126 kg (277 lb 12.5 oz)   11/20/23 0953 126 kg (278 lb)   11/17/23 1019 126 kg (278 lb)   11/06/23 1119 126 kg " (278 lb)   10/11/23 1020 126 kg (278 lb)   08/22/23 0917 126 kg (278 lb)   08/21/23 1017 129 kg (285 lb)   07/24/23 0849 129 kg (285 lb)   07/19/23 1346 129 kg (285 lb)   06/29/23 1009 132 kg (290 lb)   03/23/23 1508 132 kg (290 lb)   02/21/23 1050 127 kg (280 lb)   01/13/23 1316 127 kg (280 lb)   11/03/22 1211 123 kg (271 lb)   10/19/22 1253 122 kg (270 lb)   09/01/22 1118 123 kg (271 lb)   08/25/22 1055 123 kg (271 lb)   08/16/22 1104 123 kg (272 lb)   07/13/22 1330 123 kg (272 lb)   07/06/22 1100 123 kg (272 lb)   06/30/22 1320 123 kg (272 lb)   06/15/22 1012 123 kg (272 lb)   05/10/22 0842 123 kg (272 lb)   04/05/22 2151 123 kg (270 lb 8.1 oz)   04/05/22 1501 125 kg (276 lb 3.8 oz)   03/17/22 1219 122 kg (270 lb)   01/31/22 1222 112 kg (248 lb)   11/01/21 1401 108 kg (238 lb)   08/13/21 0925 110 kg (243 lb)   07/27/21 1337 110 kg (243 lb)      --  Labs       Pertinent Labs    Results from last 7 days   Lab Units 12/27/23  0425 12/26/23  0541 12/24/23  0616 12/23/23  0620 12/22/23  0431   SODIUM mmol/L 135* 141 139 137 136   POTASSIUM mmol/L 4.5 4.3 4.3 4.7 4.5   CHLORIDE mmol/L 96* 102 101 98 99   CO2 mmol/L 30.2* 29.2* 29.4* 29.9* 30.0*   BUN mg/dL 37* 29* 28* 26* 26*   CREATININE mg/dL 2.00* 1.48* 1.77* 1.43* 1.52*   CALCIUM mg/dL 9.1 9.0 8.9 9.1 9.0   BILIRUBIN mg/dL  --   --  0.3 0.2 0.3   ALK PHOS U/L  --   --  155* 168* 149*   ALT (SGPT) U/L  --   --  <5 12 16   AST (SGOT) U/L  --   --  16 18 15   GLUCOSE mg/dL 143* 81 102* 191* 110*     Results from last 7 days   Lab Units 12/27/23  0425   PHOSPHORUS mg/dL 3.3   HEMOGLOBIN g/dL 9.2*   HEMATOCRIT % 28.6*   WBC 10*3/mm3 12.04*   ALBUMIN g/dL 2.8*     Results from last 7 days   Lab Units 12/27/23  0425 12/26/23  0541 12/24/23  0616 12/23/23  0440 12/22/23  0431   PLATELETS 10*3/mm3 466* 498* 439 448 477*     COVID19   Date Value Ref Range Status   04/06/2022 Detected (C) Not Detected - Ref. Range Final     Lab Results   Component Value Date    HGBA1C 8.20  (H) 2023          Medications           Scheduled Medications apixaban, 5 mg, Oral, Q12H  atorvastatin, 20 mg, Oral, Nightly  budesonide-formoterol, 2 puff, Inhalation, BID - RT  calcium carbonate (oyster shell), 500 mg, Oral, Daily  carvedilol, 25 mg, Oral, Q12H  castor oil-balsam peru, 1 application , Topical, Q12H  cholecalciferol, 2,000 Units, Oral, Daily  DULoxetine, 60 mg, Oral, BID  famotidine, 40 mg, Oral, QAM  ferrous sulfate, 325 mg, Oral, BID With Meals  finasteride, 5 mg, Oral, Daily  folic acid, 1 mg, Oral, Daily  gabapentin, 300 mg, Oral, Q12H  hydrocortisone-bacitracin-zinc oxide-nystatin, 1 application , Topical, Q12H  insulin glargine, 50 Units, Subcutaneous, Daily  insulin lispro, 2-7 Units, Subcutaneous, 4x Daily AC & at Bedtime  insulin lispro, 8 Units, Subcutaneous, TID AC  magnesium oxide, 400 mg, Oral, Daily  montelukast, 10 mg, Oral, Nightly  mupirocin, 1 application , Topical, Q12H  NIFEdipine XL, 30 mg, Oral, QAM  O2, 2 L/min, Inhalation, Once  senna-docusate sodium, 2 tablet, Oral, BID  sodium chloride, 10 mL, Intravenous, Q12H       Infusions     PRN Medications   acetaminophen    albuterol    senna-docusate sodium **AND** polyethylene glycol **AND** bisacodyl **AND** bisacodyl    dextrose    dextrose    glucagon (human recombinant)    heparin    melatonin    [] HYDROmorphone **AND** naloxone    ondansetron **OR** ondansetron    Insert Peripheral IV **AND** sodium chloride    Access VAD **AND** sodium chloride    sodium chloride    sodium chloride    sodium chloride     Physical Findings          General Findings alert, obese, oriented   Oral/Mouth Cavity tooth or teeth missing   Edema  2+ (mild)   Gastrointestinal last bowel movement:    Skin  blisters, bruising, pressure injury:  , surgical incision:  , other:   incision, left heel pressure injury, right medial abd abscess, left gluteal wound, left leg wound   Tubes/Drains/Lines implantable port   NFPE No clinical  signs of muscle wasting or fat loss   --  Current Nutrition Orders & Evaluation of Intake       Oral Nutrition     Food Allergies NKFA   Current PO Diet Diet: Regular/House Diet; Texture: Regular Texture (IDDSI 7); Fluid Consistency: Thin (IDDSI 0)   Supplement n/a   PO Evaluation     % PO Intake 100%    Factors Affecting Intake: No factors at this time   --  PES STATEMENT / NUTRITION DIAGNOSIS      Nutrition Dx Problem  Problem: Nutrition Appropriate for Condition at this Time  Etiology: Medical Diagnosis - Fx    Signs/Symptoms: Report/Observation     NUTRITION INTERVENTION / PLAN OF CARE      Intervention Goal(s) Maintain nutrition status, Reduce/improve symptoms, Maintain intake, and Appropriate weight loss         RD Intervention/Action Interview for preferences and Continue to monitor   --      Prescription/Orders:       PO Diet       Supplements       Enteral Nutrition       Parenteral Nutrition    New Prescription Ordered? Continue same per protocol   --      Monitor/Evaluation Per protocol   Discharge Plan/Needs Pending clinical course   --    RD to follow per protocol.      Electronically signed by:  Judy Loo RD  12/27/23 11:26 EST

## 2023-12-27 NOTE — PROGRESS NOTES
"Physicians Statement of Medical Necessity for  Ambulance Transportation    GENERAL INFORMATION     Name: Preston Wallis  YOB: 1965  Medicare #: see [email or web address]  Transport Date: 12/28/23   (Valid for round trips this date, or for scheduled repetitive trips for 60 days from the date signed below.)  Origin: [facility] 7P 793  Destination: [facility])  Is the Patient's stay covered under Medicare Part A (PPS/DRG?)Yes  Closest appropriate facility? Yes  If this a hosp-hosp transfer? No  Is this a hospice patient? No    MEDICAL NECESSITY QUESTIONAIRE    Ambulance Transportation is medically necessary only if other means of transportation are contraindicated or would be potentially harmful to the patient.  To meet this requirement, the patient must be either \"bed confined\" or suffer from a condition such that transport by means other than an ambulance is contraindicated by the patient's condition.  The following questions must be answered by the healthcare professional signing below for this form to be valid:     1) Describe the MEDICAL CONDITION (physical and/or mental) of this patient AT THE TIME OF AMBULANCE TRANSPORT that requires the patient to be transported in an ambulance, and why transport by other means is contraindicated by the patient's condition: post op tibial fx/requires O2  Past Medical History:   Diagnosis Date    Age-related cognitive decline     Allergic contact dermatitis     Allergies     Anemia     Bronchiectasis with acute lower respiratory infection     Charcot foot due to diabetes mellitus 9/10/2013    Chronic diastolic (congestive) heart failure     Chronic kidney disease     Chronic respiratory failure with hypoxia     Closed supracondylar fracture of femur 1/12/2022    COPD (chronic obstructive pulmonary disease)     Deep vein thrombosis (DVT) of lower extremity associated with air travel 1/13/2023    Dependence on supplemental oxygen     Eczema     Erectile " "dysfunction     due to organic reasons    Essential (primary) hypertension     Fracture     closed fracture of other tarsal and metatarsal bones    Fracture of proximal humerus 1/13/2023    GERD without esophagitis     High risk medication use     Hypercholesteremia     Hypomagnesemia     Infected stasis ulcer of left lower extremity 1/13/2023    Insomnia     Low back pain     Major depressive disorder     Morbid (severe) obesity due to excess calories     MRSA pneumonia     Muscle weakness     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 without evidence of necrosis     Obstructive sleep apnea (adult) (pediatric)     Other forms of dyspnea     Other long term (current) drug therapy     Other specified noninfective gastroenteritis and colitis     Other spondylosis, lumbar region     Pain in both knees     Paroxysmal atrial fibrillation     Peripheral neuropathy     attributed to type 2 diabetes    Pneumonia, unspecified organism     Polyneuropathy     Rash and other nonspecific skin eruption     Syncope and collapse     Tachycardia     Tinnitus 1/13/2023    Type 1 diabetes mellitus with diabetic chronic kidney disease     Type 2 diabetes mellitus     Unspecified fall, initial encounter     Urinary retention       Past Surgical History:   Procedure Laterality Date    CHOLECYSTECTOMY      CYSTOSCOPY      FEMUR SURGERY Left     Shravan placed    KNEE SURGERY Left     OTHER SURGICAL HISTORY Left     venous port    TIBIAL PLATEAU OPEN REDUCTION INTERNAL FIXATION Left 12/22/2023    Procedure: TIBIAL PLATEAU OPEN REDUCTION INTERNAL FIXATION;  Surgeon: Hugo Kline MD;  Location: [facility];  Service: Orthopedics;  Laterality: Left;    TONSILLECTOMY AND ADENOIDECTOMY        2) Is this patient \"bed confined\" as defined below?Yes   To be \"bed confined\" the patient must satisfy all three of the following criteria:  (1) unable to get up from bed without assistance; AND (2) " unable to ambulate;  AND (3) unable to sit in a chair or wheelchair.  3) Can this patient safely be transported by car or wheelchair van (I.e., may safely sit during transport, without an attendant or monitoring?)No   4. In addition to completing questions 1-3 above, please check any of the following conditions that apply*:          *Note: supporting documentation for any boxes checked must be maintained in the patient's medical records Non-healed fractures, Moderate/severe pain on movement, Medical attendant required, Unable to tolerate seated position for time needed to transport, and Orthopedic device (backboard, halo, pins, traction, brace, wedge, etc.) required special handling during transport      SIGNATURE OF PHYSICIAN OR OTHER AUTHORIZED HEALTHCARE PROFESSIONAL    I certify that the above information is true and correct based on my evaluation of this patient, and represent that the patient requires transport by ambulance and that other forms of transport are contraindicated.  I understand that this information will be used by the Centers for Medicare and Medicaid Services (CMS) to support the determiniation of medical necessity for ambulance services, and I represent that I have personal knowledge of the patient's condition at the time of transport.    X   If this box is checked, I also certify that the patient is physically or mentally incapable of signing the ambulance service's claim form and that the institution with which I am affiliated has furnished care, services or assistance to the patient.  My signature below is made on behalf of the patient pursuant to 42 .36(b)(4). In accordance with 42 .37, the specific reason(s) that the patient is physically or mentally incapable of signing the claim for is as follows: post op/require supp O2    Signature of Physician or Healthcare Professional  Date/Time:   12/28/23 1100     (For Scheduled repetitive transport, this form is not valid for  transports performed more than 60 days after this date).                                                                                                                                            --------------------------------------------------------------------------------------------  Printed Name and Credentials of Physician or Authorized Healthcare Professional     *Form must be signed by patient's attending physician for scheduled, repetitive transports,.  For non-repetitive ambulance transports, if unable to obtain the signature of the attending physician, any of the following may sign (please select below):     Physician  Clinical Nurse Specialist  Registered Nurse     Physician Assistant  Discharge Planner  Licensed Practical Nurse     Nurse Practitioner

## 2023-12-27 NOTE — PLAN OF CARE
Goal Outcome Evaluation:  Plan of Care Reviewed With: patient        Progress: improving  Outcome Evaluation: patient transferring with max assist x1 to [place], f/c in place for chronic urinary retention, BM this shift, plans to d/c 12/27/23, educated on b/p and glucose monitoring

## 2023-12-27 NOTE — PROGRESS NOTES
[facility] Progress Note      Patient Name: Preston Wallis  : 1965  MRN: [id number]  Primary Care Physician:  Kimmy Riley MD  Date of admission: 2023    Subjective     Interval History:   F/u CKD3B    Review of Systems:   Patient not sure if he is or isn't taking losartan and bumex at home (both listed in office visit )  BP labile but better  Denies dyspnea     Objective     Vitals:   Temp:  [97.8 °F (36.6 °C)-98.7 °F (37.1 °C)] 98.7 °F (37.1 °C)  Heart Rate:  [74-84] 79  Resp:  [16-20] 16  BP: (132-188)/(73-94) 132/73  Flow (L/min):  [2] 2    Intake/Output Summary (Last 24 hours) at 2023 0858  Last data filed at 2023 0758  Gross per 24 hour   Intake 1560 ml   Output 1200 ml   Net 360 ml       Physical Exam:    General Appearance: alert, comfortable  Neck: supple, no JVD  Lungs: CTA bilat no rales   Heart: RRR, normal S1 and S2  Abdomen: soft, nontender, nondistended  : trace palpable bladder      Scheduled Meds:     apixaban, 5 mg, Oral, Q12H  atorvastatin, 20 mg, Oral, Nightly  budesonide-formoterol, 2 puff, Inhalation, BID - RT  calcium carbonate (oyster shell), 500 mg, Oral, Daily  carvedilol, 25 mg, Oral, Q12H  castor oil-balsam peru, 1 application , Topical, Q12H  cholecalciferol, 2,000 Units, Oral, Daily  DULoxetine, 60 mg, Oral, BID  famotidine, 40 mg, Oral, QAM  ferrous sulfate, 325 mg, Oral, BID With Meals  finasteride, 5 mg, Oral, Daily  folic acid, 1 mg, Oral, Daily  gabapentin, 300 mg, Oral, Q12H  hydrocortisone-bacitracin-zinc oxide-nystatin, 1 application , Topical, Q12H  insulin glargine, 50 Units, Subcutaneous, Daily  insulin lispro, 2-7 Units, Subcutaneous, 4x Daily AC & at Bedtime  insulin lispro, 8 Units, Subcutaneous, TID AC  losartan, 25 mg, Oral, Q24H  magnesium oxide, 400 mg, Oral, Daily  montelukast, 10 mg, Oral, Nightly  mupirocin, 1 application , Topical, Q12H  NIFEdipine XL, 30 mg, Oral, QAM  O2, 2 L/min, Inhalation,  Once  senna-docusate sodium, 2 tablet, Oral, BID  sodium chloride, 10 mL, Intravenous, Q12H      IV Meds:        Results Reviewed:   I have personally reviewed the results from the time of this admission to 12/27/2023 08:58 EST     Results from last 7 days   Lab Units 12/27/23  0425 12/26/23  0541 12/24/23  0616 12/23/23  0620 12/22/23  0431   SODIUM mmol/L 135* 141 139 137 136   POTASSIUM mmol/L 4.5 4.3 4.3 4.7 4.5   CHLORIDE mmol/L 96* 102 101 98 99   CO2 mmol/L 30.2* 29.2* 29.4* 29.9* 30.0*   BUN mg/dL 37* 29* 28* 26* 26*   CREATININE mg/dL 2.00* 1.48* 1.77* 1.43* 1.52*   CALCIUM mg/dL 9.1 9.0 8.9 9.1 9.0   BILIRUBIN mg/dL  --   --  0.3 0.2 0.3   ALK PHOS U/L  --   --  155* 168* 149*   ALT (SGPT) U/L  --   --  <5 12 16   AST (SGOT) U/L  --   --  16 18 15   GLUCOSE mg/dL 143* 81 102* 191* 110*     Estimated Creatinine Clearance: 53.1 mL/min (A) (by C-G formula based on SCr of 2 mg/dL (H)).  Results from last 7 days   Lab Units 12/27/23 0425 12/26/23  0541   PHOSPHORUS mg/dL 3.3 2.7         Results from last 7 days   Lab Units 12/27/23  0425 12/26/23  0541 12/24/23  0616 12/23/23  0440 12/22/23  0431   WBC 10*3/mm3 12.04* 9.86 10.24 9.25 10.28   HEMOGLOBIN g/dL 9.2* 9.7* 9.3* 10.0* 7.6*   PLATELETS 10*3/mm3 466* 498* 439 448 477*           Assessment / Plan     ASSESSMENT:  CKD stage 3B - diabetic nephropathy with subnephrotic proteinuria 2.8gm.  Also obs uropathy related to neurogenic bladder.  Dr Martínez follows.  Cr worse 2.0, prerenal/hemodynamic in relation to ARB resumption and labile BPs.  BL mid 1's.  Lytes stable.  Also on farxiga, on hold currently  HTN - uncontrolled, though better with change metop to coreg yesterday and resumption of losartan 25 (listed as taking this at office visit June 2023, not on admission med list however)  Neurogenic bladder, self caths at home, currently with lopez here   Proximal left tibia and fibula fractures on the left side. S/p surgical repair on 12/22/2023 by orthopedics  followed closely.   DM2 w/ complications ( , gastroparesis , neuropathy , nephropathy ); mgmt per A  Edema - mild, but need to clarify if still on diuretic at home (was on buemx 2mg BID as of June 2023, while no loop diuretics listed on admission).  PAF on AC (eliquis)  Anemia of CKD, hgb stable 9.2    PLAN:  Hold ARB tomorrow AM (already received today) pending repeat labs to assure Cr stable  Asked RN to clarify with pharmacy if actually on diuretic at home   Rehab placement in progress  Continue coreg in place of efren Onofre MD  12/27/23  08:58 [facility]    [facility]  [phone]

## 2023-12-27 NOTE — PLAN OF CARE
Goal Outcome Evaluation:           Progress: no change  Outcome Evaluation: vss, nvi, dressings changed this am, up assist of 1 stand/pivot to chair/BSC, voiding per lopez, ambulance scheduled for tomorrow at 11 for DC to [facility] in [place], educated on bp meds and monitoring

## 2023-12-28 ENCOUNTER — READMISSION MANAGEMENT (OUTPATIENT)
Dept: CALL CENTER | Facility: HOSPITAL | Age: 58
End: 2023-12-28
Payer: COMMERCIAL

## 2023-12-28 VITALS
OXYGEN SATURATION: 98 % | WEIGHT: 280 LBS | TEMPERATURE: 97.6 F | HEART RATE: 85 BPM | HEIGHT: 69 IN | DIASTOLIC BLOOD PRESSURE: 70 MMHG | BODY MASS INDEX: 41.47 KG/M2 | RESPIRATION RATE: 18 BRPM | SYSTOLIC BLOOD PRESSURE: 145 MMHG

## 2023-12-28 LAB
ALBUMIN SERPL-MCNC: 3 G/DL (ref 3.5–5.2)
ANION GAP SERPL CALCULATED.3IONS-SCNC: 9.5 MMOL/L (ref 5–15)
BASOPHILS # BLD AUTO: 0.08 10*3/MM3 (ref 0–0.2)
BASOPHILS NFR BLD AUTO: 0.7 % (ref 0–1.5)
BUN SERPL-MCNC: 37 MG/DL (ref 6–20)
BUN/CREAT SERPL: 20.8 (ref 7–25)
CALCIUM SPEC-SCNC: 8.9 MG/DL (ref 8.6–10.5)
CHLORIDE SERPL-SCNC: 101 MMOL/L (ref 98–107)
CO2 SERPL-SCNC: 28.5 MMOL/L (ref 22–29)
CREAT SERPL-MCNC: 1.78 MG/DL (ref 0.76–1.27)
DEPRECATED RDW RBC AUTO: 40.7 FL (ref 37–54)
EGFRCR SERPLBLD CKD-EPI 2021: 43.7 ML/MIN/1.73
EOSINOPHIL # BLD AUTO: 1.24 10*3/MM3 (ref 0–0.4)
EOSINOPHIL NFR BLD AUTO: 11.2 % (ref 0.3–6.2)
ERYTHROCYTE [DISTWIDTH] IN BLOOD BY AUTOMATED COUNT: 13.2 % (ref 12.3–15.4)
GLUCOSE BLDC GLUCOMTR-MCNC: 81 MG/DL (ref 70–130)
GLUCOSE SERPL-MCNC: 77 MG/DL (ref 65–99)
HCT VFR BLD AUTO: 27.3 % (ref 37.5–51)
HGB BLD-MCNC: 9.1 G/DL (ref 13–17.7)
IMM GRANULOCYTES # BLD AUTO: 0.08 10*3/MM3 (ref 0–0.05)
IMM GRANULOCYTES NFR BLD AUTO: 0.7 % (ref 0–0.5)
LYMPHOCYTES # BLD AUTO: 2.45 10*3/MM3 (ref 0.7–3.1)
LYMPHOCYTES NFR BLD AUTO: 22.2 % (ref 19.6–45.3)
MCH RBC QN AUTO: 28.7 PG (ref 26.6–33)
MCHC RBC AUTO-ENTMCNC: 33.3 G/DL (ref 31.5–35.7)
MCV RBC AUTO: 86.1 FL (ref 79–97)
MONOCYTES # BLD AUTO: 1.7 10*3/MM3 (ref 0.1–0.9)
MONOCYTES NFR BLD AUTO: 15.4 % (ref 5–12)
NEUTROPHILS NFR BLD AUTO: 49.8 % (ref 42.7–76)
NEUTROPHILS NFR BLD AUTO: 5.49 10*3/MM3 (ref 1.7–7)
NRBC BLD AUTO-RTO: 0 /100 WBC (ref 0–0.2)
PHOSPHATE SERPL-MCNC: 3.6 MG/DL (ref 2.5–4.5)
PLATELET # BLD AUTO: 481 10*3/MM3 (ref 140–450)
PMV BLD AUTO: 8.7 FL (ref 6–12)
POTASSIUM SERPL-SCNC: 4.3 MMOL/L (ref 3.5–5.2)
RBC # BLD AUTO: 3.17 10*6/MM3 (ref 4.14–5.8)
SODIUM SERPL-SCNC: 139 MMOL/L (ref 136–145)
WBC NRBC COR # BLD AUTO: 11.04 10*3/MM3 (ref 3.4–10.8)

## 2023-12-28 PROCEDURE — 82948 REAGENT STRIP/BLOOD GLUCOSE: CPT

## 2023-12-28 PROCEDURE — 94799 UNLISTED PULMONARY SVC/PX: CPT

## 2023-12-28 PROCEDURE — 94664 DEMO&/EVAL PT USE INHALER: CPT

## 2023-12-28 PROCEDURE — 80069 RENAL FUNCTION PANEL: CPT | Performed by: INTERNAL MEDICINE

## 2023-12-28 PROCEDURE — 63710000001 INSULIN GLARGINE PER 5 UNITS: Performed by: ORTHOPAEDIC SURGERY

## 2023-12-28 PROCEDURE — 85025 COMPLETE CBC W/AUTO DIFF WBC: CPT | Performed by: INTERNAL MEDICINE

## 2023-12-28 PROCEDURE — 63710000001 INSULIN LISPRO (HUMAN) PER 5 UNITS: Performed by: ORTHOPAEDIC SURGERY

## 2023-12-28 RX ORDER — LOSARTAN POTASSIUM 25 MG/1
25 TABLET ORAL
Status: DISCONTINUED | OUTPATIENT
Start: 2023-12-28 | End: 2023-12-28 | Stop reason: HOSPADM

## 2023-12-28 RX ORDER — LOSARTAN POTASSIUM 25 MG/1
25 TABLET ORAL
Qty: 30 TABLET | Refills: 0 | Status: SHIPPED | OUTPATIENT
Start: 2023-12-29

## 2023-12-28 RX ORDER — CARVEDILOL 25 MG/1
25 TABLET ORAL EVERY 12 HOURS SCHEDULED
Qty: 60 TABLET | Refills: 0 | Status: SHIPPED | OUTPATIENT
Start: 2023-12-28

## 2023-12-28 RX ORDER — BUDESONIDE, GLYCOPYRROLATE, AND FORMOTEROL FUMARATE 160; 9; 4.8 UG/1; UG/1; UG/1
AEROSOL, METERED RESPIRATORY (INHALATION)
Qty: 10.7 G | Refills: 11 | Status: SHIPPED | OUTPATIENT
Start: 2023-12-28

## 2023-12-28 RX ADMIN — ZINC OXIDE 1 APPLICATION: 200 OINTMENT TOPICAL at 08:35

## 2023-12-28 RX ADMIN — DULOXETINE HYDROCHLORIDE 60 MG: 60 CAPSULE, DELAYED RELEASE ORAL at 08:34

## 2023-12-28 RX ADMIN — LOSARTAN POTASSIUM 25 MG: 25 TABLET, FILM COATED ORAL at 09:21

## 2023-12-28 RX ADMIN — Medication 2000 UNITS: at 08:33

## 2023-12-28 RX ADMIN — NIFEDIPINE 30 MG: 30 TABLET, FILM COATED, EXTENDED RELEASE ORAL at 06:22

## 2023-12-28 RX ADMIN — FINASTERIDE 5 MG: 5 TABLET, FILM COATED ORAL at 08:33

## 2023-12-28 RX ADMIN — FAMOTIDINE 40 MG: 20 TABLET ORAL at 06:06

## 2023-12-28 RX ADMIN — FOLIC ACID 1 MG: 1 TABLET ORAL at 08:33

## 2023-12-28 RX ADMIN — BUDESONIDE AND FORMOTEROL FUMARATE DIHYDRATE 2 PUFF: 160; 4.5 AEROSOL RESPIRATORY (INHALATION) at 07:45

## 2023-12-28 RX ADMIN — MUPIROCIN 1 APPLICATION: 20 OINTMENT TOPICAL at 08:36

## 2023-12-28 RX ADMIN — CASTOR OIL AND BALSAM, PERU 1 APPLICATION: 788; 87 OINTMENT TOPICAL at 08:34

## 2023-12-28 RX ADMIN — APIXABAN 5 MG: 5 TABLET, FILM COATED ORAL at 08:33

## 2023-12-28 RX ADMIN — Medication 500 MG: at 08:34

## 2023-12-28 RX ADMIN — MAGNESIUM OXIDE 400 MG (241.3 MG MAGNESIUM) TABLET 400 MG: TABLET at 08:33

## 2023-12-28 RX ADMIN — GABAPENTIN 300 MG: 300 CAPSULE ORAL at 08:32

## 2023-12-28 RX ADMIN — Medication 10 ML: at 08:36

## 2023-12-28 RX ADMIN — INSULIN LISPRO 8 UNITS: 100 INJECTION, SOLUTION INTRAVENOUS; SUBCUTANEOUS at 08:37

## 2023-12-28 RX ADMIN — FERROUS SULFATE TAB 325 MG (65 MG ELEMENTAL FE) 325 MG: 325 (65 FE) TAB at 08:32

## 2023-12-28 RX ADMIN — CARVEDILOL 25 MG: 25 TABLET, FILM COATED ORAL at 08:33

## 2023-12-28 RX ADMIN — INSULIN GLARGINE 50 UNITS: 100 INJECTION, SOLUTION SUBCUTANEOUS at 08:31

## 2023-12-28 NOTE — PLAN OF CARE
Goal Outcome Evaluation:  Plan of Care Reviewed With: patient        Progress: improving  Outcome Evaluation: VSS. Pt cont voiding per lopez and care done. Dressings are c/d/i. C/o minimal pain as of the moment. Wound care done and tolerated well. Educated on lopez cath and central cath infection prevention, blood glucose and b/p meds and monitoring, falls precaution and medication management. Plan to d/c to [facility] today. Will take note of any changes.

## 2023-12-28 NOTE — PLAN OF CARE
Goal Outcome Evaluation:           Progress: improving  Outcome Evaluation: Discharged to [facility] this AM. PIV removed. Report given to oncoming staff. Meds sent to rehab.

## 2023-12-28 NOTE — PAYOR COMM NOTE
"Preston Wallis \"Gómez\" (58 y.o. Male)      PATIENT DISCHARGED 12/28/2023: REF#  [id number]       DEPT: [phone],  [phone]     [facility]: NPI [id number] [facility]# [id number]     LEISA REIS RN,[facility]       Date of Birth   1965    Social Security Number       Address   [address] [place] [address]    Home Phone       MRN   [id number]       Zoroastrianism   Muslim    Marital Status                               Admission Date   12/14/23    Admission Type   Urgent    Admitting Provider   Yohana Anaya MD    Attending Provider       Department, Room/Bed   [address], P793/1       Discharge Date   12/28/2023    Discharge Disposition   Home or Self Care    Discharge Destination                                 Attending Provider: (none)   Allergies: Benadryl [Diphenhydramine], Proventil [Albuterol]    Isolation: None   Infection: MRSA/History Only (12/15/23)   Code Status: CPR    Ht: 175.3 cm (69\")   Wt: 127 kg (280 lb)    Admission Cmt: None   Principal Problem: Closed fracture of left tibial plateau [S82.142A]                   Active Insurance as of 12/14/2023       Primary Coverage       Payor Plan Insurance Group Employer/Plan Group    [facility] BY YING [facility] BY YING [id number]       Payor Plan Address Payor Plan Phone Number Payor Plan Fax Number Effective Dates    [address] [address]   7/1/2022 - None Entered    [facility] [address]         Subscriber Name Subscriber Birth Date Member ID       PRESTON WALLIS 1965 [id number]                     Emergency Contacts        (Rel.) Home Phone Work Phone Mobile Phone    Kami Wallis (Spouse) [phone]    KayleyElaine (Daughter) -- -- [phone]                 Discharge Summary        Shabbir Peraza MD at 12/28/23 [facility]                              [facility]    Date of Discharge:  " 12/28/2023    PCP: Kimmy Riley MD    Discharge Diagnosis:   Active Hospital Problems    Diagnosis  POA    **Closed fracture of left tibial plateau [S82.142A]  Yes    Essential hypertension [I10]  Yes    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     Procedure(s):  TIBIAL PLATEAU OPEN REDUCTION INTERNAL FIXATION     Consults       Date and Time Order Name Status Description    12/24/2023  2:44 PM Inpatient Nephrology Consult      12/14/2023  6:26 PM Inpatient Orthopedic Surgery Consult Completed     12/14/2023  5:49 PM [facility] (on-call MD unless specified) Details      12/14/2023  4:25 PM Ortho (on-call MD unless specified) Completed     12/7/2023  7:18 AM Inpatient Pulmonology Consult Completed     12/6/2023 10:17 PM Inpatient Nephrology Consult Completed     12/6/2023  8:36 PM Inpatient Infectious Diseases Consult Completed           Hospital Course  58 y.o. male with past medical history significant for hypertension, diabetes, COPD, atrial fibrillation, chronic kidney disease, presented to the hospital with closed fracture of left tibial plateau, orthopedics was consulted.  We had continued pain control and patient underwent surgical intervention.  Patient also continued physical therapy.  Patient has been cleared by orthopedics for discharge, he will be going to rehab facility today.  I have seen and examined patient at bedside, total time spent on discharge management for this patient is more than 30 minutes.  Plan of care discussed with the patient and he verbalized understanding.        Temp:  [96.7 °F (35.9 °C)-97.7 °F (36.5 °C)] 97.6 °F (36.4 °C)  Heart Rate:  [79-85] 85  Resp:  [16-18]  18  BP: (135-156)/(70-74) 145/70  Body mass index is 41.35 kg/m².    Physical Exam  General, awake and alert.  Head and ENT, normocephalic and atraumatic.  Lungs, symmetric expansion, equal air entry bilaterally.  Heart, regular rate and rhythm.  Abdomen, soft and nontender.  Extremities, no clubbing or cyanosis.  Neuro, no focal deficits.  Skin: Warm and no rash.  Psych, normal mood and affect.  Musculoskeletal, joint examination is grossly normal.      Disposition: Home or Self Care       Discharge Medications        New Medications        Instructions Start Date   carvedilol 25 MG tablet  Commonly known as: COREG   25 mg, Oral, Every 12 Hours Scheduled      losartan 25 MG tablet  Commonly known as: COZAAR   25 mg, Oral, Every 24 Hours Scheduled   Start Date: December 29, 2023            Changes to Medications        Instructions Start Date   Breztri Aerosphere 160-9-4.8 MCG/ACT aerosol inhaler  Generic drug: Budeson-Glycopyrrol-Formoterol  What changed: See the new instructions.   INHALE 2 PUFFS TWICE DAILY. RINSE MOUTH OUT AFTER EACH USE             Continue These Medications        Instructions Start Date   apixaban 5 MG tablet tablet  Commonly known as: Eliquis   5 mg, Oral, Every 12 Hours      atorvastatin 20 MG tablet  Commonly known as: LIPITOR   20 mg, Oral, Nightly      B-D UF III MINI PEN NEEDLES 31G X 5 MM misc  Generic drug: Insulin Pen Needle   USE FOUR TIMES DAILY BEFORE MEALS AND AT BEDTIME      Bydureon BCise 2 MG/0.85ML auto-injector injection  Generic drug: exenatide er   2 mg, Subcutaneous, Weekly      calcium citrate 950 (200 Ca) MG tablet  Commonly known as: CALCITRATE   950 mg, Oral, Daily      docusate sodium 100 MG capsule  Commonly known as: COLACE   100 mg, Oral, Daily      DULoxetine 60 MG capsule  Commonly known as: CYMBALTA   60 mg, Oral, 2 Times Daily      famotidine 40 MG tablet  Commonly known as: PEPCID   40 mg, Oral, Every Morning      Farxiga 5 MG tablet tablet  Generic drug:  dapagliflozin   5 mg, Oral, Daily      ferrous gluconate 324 MG tablet  Commonly known as: FERGON   324 mg, Oral, Daily With Breakfast      finasteride 5 MG tablet  Commonly known as: PROSCAR   5 mg, Oral, Daily      Fluticasone-Salmeterol 500-50 MCG/ACT DISKUS  Commonly known as: ADVAIR/WIXELA   1 puff, Inhalation, 2 Times Daily - RT      folic acid 1 MG tablet  Commonly known as: FOLVITE   1 mg, Oral, Daily      FreeStyle Bethany 2 [place] device   No dose, route, or frequency recorded.      FreeStyle Bethany 2 Sensor misc   USE every 14 DAYS      gabapentin 300 MG capsule  Commonly known as: NEURONTIN   TAKE ONE CAPSULE BY MOUTH EVERY MORNING AND TAKE TWO CAPSULES BY MOUTH EVERY NIGHT AT BEDTIME      glucose blood test strip   1 each, Other, Daily, Dx:   E11.40      Insulin Lispro (1 Unit Dial) 100 UNIT/ML solution pen-injector  Commonly known as: HUMALOG   inject EIGHT units UNDER THE SKIN INTO THE APPROPRIATE AREA THREE TIMES DAILY PER sliding scale, IF BLOOD SUGAR < 160= 0 units, 161-220= 2 units, 221-280= 4 units, 281-340 = SIX units, 341-400 = EIGHT units      Lantus SoloStar 100 UNIT/ML injection pen  Generic drug: Insulin Glargine   50 Units, Subcutaneous, Daily      magnesium oxide 400 tablet tablet  Commonly known as: MAG-OX   400 mg, Oral, Daily, Started by Flaget       montelukast 10 MG tablet  Commonly known as: SINGULAIR   10 mg, Oral, Every Night at Bedtime      NIFEdipine XL 30 MG 24 hr tablet  Commonly known as: PROCARDIA XL   30 mg, Oral, Every Morning      O2  Commonly known as: OXYGEN   2 Liter O2 - CONTINUOUS (route: Oxygen)      ondansetron ODT 4 MG disintegrating tablet  Commonly known as: ZOFRAN-ODT   PLACE 1 TABLET ON THE TONGUE EVERY 8 HOURS AS NEEDED FOR NAUSEA AND VOMITING      Ozempic (1 MG/DOSE) 4 MG/3ML solution pen-injector  Generic drug: Semaglutide (1 MG/DOSE)   1 mg, Subcutaneous, Weekly      TRUEplus Lancets 28G misc   USE AS DIRECTED      Ventolin  (90 Base) MCG/ACT  inhaler  Generic drug: albuterol sulfate HFA   INHALE 2 PUFFS FOUR TIMES DAILY AS NEEDED FOR WHEEZING      Vitamin D3 50 MCG (2000 UT) tablet   50 mcg, Oral, Daily             Stop These Medications      metoprolol tartrate 100 MG tablet  Commonly known as: LOPRESSOR               Additional Instructions for the Follow-ups that You Need to Schedule       Discharge Follow-up with PCP   As directed       Currently Documented PCP:    Kimmy Riley MD    PCP Phone Number:    [phone]     Follow Up Details: Follow-up with PCP in 7 days.               Contact information for follow-up providers       Hugo Kline MD Follow up on 1/23/2024.    Specialty: Orthopedic Surgery  Contact information:  [address] [facility]  [address]  [place] [id number] [phone]               Kimmy Riley MD .    Specialty: Family Medicine  Why: Follow-up with PCP in 7 days.  Contact information:  [address] [facility]  [address]  [place] [address]  [phone]                       Contact information for after-discharge care       Home Medical Care       [facility] .    Services: Home Living Aide Services, Home Medical , Home Nursing, Home Rehabilitation  Contact information:  [address] [facility] [address], [address]  [facility] [id number] [phone]                                  Future Appointments   Date Time Provider Department Center   1/11/2024  3:45 PM Severiano Carolina MD [facility]   1/17/2024 10:30 AM Nilton Sandoval MD [facility] ENT BAR VAUGHN   1/30/2024 10:30 AM Cindy Crabtree APRN [facility] CD BARDS [facility]   2/13/2024  9:15 AM Kimmy Riley MD [facility] PC BARDS [facility]   3/8/2024 10:40 AM Neli Paredes APRN [facility] DIAB BT VAUGHN   3/21/2024 10:00 AM Rashad Chi MD [facility] BARD OSMIN   5/20/2024 10:30 AM Teresita White [facility]        Shabbir Peraza MD  [facility]  12/28/23    Discharge time spent greater than 30  "minutes.    Electronically signed by Shabbir Wang MD at 12/28/23 1031       Discharge Order (From admission, onward)       Start     Ordered    12/28/23 1029  Discharge patient  Once        Expected Discharge Date: 12/28/23   Discharge Disposition: Home or Self Care   Physician of Record for Attribution - Please select from Treatment Team: SHABBIR WANG [[id number]]   Review needed by CMO to determine Physician of Record: No      Question Answer Comment   Physician of Record for Attribution - Please select from Treatment Team SHABBIR WANG    Review needed by CMO to determine Physician of Record No        12/28/23 1030                    Judy Holman, RN     Case Management     Progress Notes     Signed     Date of Service: 12/27/23 1655  Creation Time: 12/27/23 1655     Signed       Expand All Collapse All    Physicians Statement of Medical Necessity for  Ambulance Transportation     GENERAL INFORMATION      Name: Preston Wallis  YOB: 1965  Medicare #: see facesheet  Transport Date: 12/28/23   (Valid for round trips this date, or for scheduled repetitive trips for 60 days from the date signed below.)  Origin: [facility]  Destination: [facility]  Is the Patient's stay covered under Medicare Part A (PPS/DRG?)Yes  Closest appropriate facility? Yes  If this a hosp-hosp transfer? No  Is this a hospice patient? No     MEDICAL NECESSITY QUESTIONAIRE     Ambulance Transportation is medically necessary only if other means of transportation are contraindicated or would be potentially harmful to the patient.  To meet this requirement, the patient must be either \"bed confined\" or suffer from a condition such that transport by means other than an ambulance is contraindicated by the patient's condition.  The following questions must be answered by the healthcare professional signing below for this form to be valid:      1) Describe the MEDICAL CONDITION (physical and/or " mental) of this patient AT THE TIME OF AMBULANCE TRANSPORT that requires the patient to be transported in an ambulance, and why transport by other means is contraindicated by the patient's condition: post op tibial fx/requires O2  Medical History        Past Medical History:   Diagnosis Date    Age-related cognitive decline      Allergic contact dermatitis      Allergies      Anemia      Bronchiectasis with acute lower respiratory infection      Charcot foot due to diabetes mellitus 9/10/2013    Chronic diastolic (congestive) heart failure      Chronic kidney disease      Chronic respiratory failure with hypoxia      Closed supracondylar fracture of femur 1/12/2022    COPD (chronic obstructive pulmonary disease)      Deep vein thrombosis (DVT) of lower extremity associated with air travel 1/13/2023    Dependence on supplemental oxygen      Eczema      Erectile dysfunction       due to organic reasons    Essential (primary) hypertension      Fracture       closed fracture of other tarsal and metatarsal bones    Fracture of proximal humerus 1/13/2023    GERD without esophagitis      High risk medication use      Hypercholesteremia      Hypomagnesemia      Infected stasis ulcer of left lower extremity 1/13/2023    Insomnia      Low back pain      Major depressive disorder      Morbid (severe) obesity due to excess calories      MRSA pneumonia      Muscle weakness      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 without evidence of necrosis      Obstructive sleep apnea (adult) (pediatric)      Other forms of dyspnea      Other long term (current) drug therapy      Other specified noninfective gastroenteritis and colitis      Other spondylosis, lumbar region      Pain in both knees      Paroxysmal atrial fibrillation      Peripheral neuropathy       attributed to type 2 diabetes    Pneumonia, unspecified organism      Polyneuropathy      Rash and other nonspecific skin eruption       "Syncope and collapse      Tachycardia      Tinnitus 1/13/2023    Type 1 diabetes mellitus with diabetic chronic kidney disease      Type 2 diabetes mellitus      Unspecified fall, initial encounter      Urinary retention           Surgical History         Past Surgical History:   Procedure Laterality Date    CHOLECYSTECTOMY        CYSTOSCOPY        FEMUR SURGERY Left       Shravan placed    KNEE SURGERY Left      OTHER SURGICAL HISTORY Left       venous port    TIBIAL PLATEAU OPEN REDUCTION INTERNAL FIXATION Left 12/22/2023     Procedure: TIBIAL PLATEAU OPEN REDUCTION INTERNAL FIXATION;  Surgeon: Hugo Kline MD;  Location: [facility];  Service: Orthopedics;  Laterality: Left;    TONSILLECTOMY AND ADENOIDECTOMY             2) Is this patient \"bed confined\" as defined below?Yes              To be \"bed confined\" the patient must satisfy all three of the following criteria:       (1) unable to get up from bed without assistance; AND (2) unable to ambulate;  AND (3) unable to sit in a chair or wheelchair.  3) Can this patient safely be transported by car or wheelchair van (I.e., may safely sit during transport, without an attendant or monitoring?)No   4. In addition to completing questions 1-3 above, please check any of the following conditions that apply*:          *Note: supporting documentation for any boxes checked must be maintained in the patient's medical records Non-healed fractures, Moderate/severe pain on movement, Medical attendant required, Unable to tolerate seated position for time needed to transport, and Orthopedic device (backboard, halo, pins, traction, brace, wedge, etc.) required special handling during transport        SIGNATURE OF PHYSICIAN OR OTHER AUTHORIZED HEALTHCARE PROFESSIONAL     I certify that the above information is true and correct based on my evaluation of this patient, and represent that the patient requires transport by ambulance and that other forms of transport are " contraindicated.  I understand that this information will be used by the Centers for Medicare and Medicaid Services (CMS) to support the determiniation of medical necessity for ambulance services, and I represent that I have personal knowledge of the patient's condition at the time of transport.     X   If this box is checked, I also certify that the patient is physically or mentally incapable of signing the ambulance service's claim form and that the institution with which I am affiliated has furnished care, services or assistance to the patient.  My signature below is made on behalf of the patient pursuant to 42 .36(b)(4). In accordance with 42 .37, the specific reason(s) that the patient is physically or mentally incapable of signing the claim for is as follows: post op/require supp O2     Signature of Physician or Healthcare Professional  Date/Time:   12/28/23 1100      (For Scheduled repetitive transport, this form is not valid for transports performed more than 60 days after this date).                                                                                                                                            --------------------------------------------------------------------------------------------  Printed Name and Credentials of Physician or Authorized Healthcare Professional      *Form must be signed by patient's attending physician for scheduled, repetitive transports,.  For non-repetitive ambulance transports, if unable to obtain the signature of the attending physician, any of the following may sign (please select below):       Physician   Clinical Nurse Specialist   Registered Nurse       Physician Assistant   Discharge Planner   Licensed Practical Nurse       Nurse Practitioner

## 2023-12-28 NOTE — DISCHARGE SUMMARY
[facility]    Date of Discharge:  12/28/2023    PCP: Kimmy Riley MD    Discharge Diagnosis:   Active Hospital Problems    Diagnosis  POA    **Closed fracture of left tibial plateau [S82.142A]  Yes    Essential hypertension [I10]  Yes    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     Procedure(s):  TIBIAL PLATEAU OPEN REDUCTION INTERNAL FIXATION     Consults       Date and Time Order Name Status Description    12/24/2023  2:44 PM Inpatient Nephrology Consult      12/14/2023  6:26 PM Inpatient Orthopedic Surgery Consult Completed     12/14/2023  5:49 PM [facility] (on-call MD unless specified) Details      12/14/2023  4:25 PM Ortho (on-call MD unless specified) Completed     12/7/2023  7:18 AM Inpatient Pulmonology Consult Completed     12/6/2023 10:17 PM Inpatient Nephrology Consult Completed     12/6/2023  8:36 PM Inpatient Infectious Diseases Consult Completed           Hospital Course  58 y.o. male with past medical history significant for hypertension, diabetes, COPD, atrial fibrillation, chronic kidney disease, presented to the hospital with closed fracture of left tibial plateau, orthopedics was consulted.  We had continued pain control and patient underwent surgical intervention.  Patient also continued physical therapy.  Patient has been cleared by orthopedics for discharge, he will be going to rehab facility today.  I have seen and examined patient at bedside, total time spent on discharge management for this patient is more than 30 minutes.  Plan of care discussed with the patient and he verbalized understanding.        Temp:  [96.7 °F  (35.9 °C)-97.7 °F (36.5 °C)] 97.6 °F (36.4 °C)  Heart Rate:  [79-85] 85  Resp:  [16-18] 18  BP: (135-156)/(70-74) 145/70  Body mass index is 41.35 kg/m².    Physical Exam  General, awake and alert.  Head and ENT, normocephalic and atraumatic.  Lungs, symmetric expansion, equal air entry bilaterally.  Heart, regular rate and rhythm.  Abdomen, soft and nontender.  Extremities, no clubbing or cyanosis.  Neuro, no focal deficits.  Skin: Warm and no rash.  Psych, normal mood and affect.  Musculoskeletal, joint examination is grossly normal.      Disposition: Home or Self Care       Discharge Medications        New Medications        Instructions Start Date   carvedilol 25 MG tablet  Commonly known as: COREG   25 mg, Oral, Every 12 Hours Scheduled      losartan 25 MG tablet  Commonly known as: COZAAR   25 mg, Oral, Every 24 Hours Scheduled   Start Date: December 29, 2023            Changes to Medications        Instructions Start Date   Ogi Aerosphere 160-9-4.8 MCG/ACT aerosol inhaler  Generic drug: Budeson-Glycopyrrol-Formoterol  What changed: See the new instructions.   INHALE 2 PUFFS TWICE DAILY. RINSE MOUTH OUT AFTER EACH USE             Continue These Medications        Instructions Start Date   apixaban 5 MG tablet tablet  Commonly known as: Eliquis   5 mg, Oral, Every 12 Hours      atorvastatin 20 MG tablet  Commonly known as: LIPITOR   20 mg, Oral, Nightly      B-D UF III MINI PEN NEEDLES 31G X 5 MM misc  Generic drug: Insulin Pen Needle   USE FOUR TIMES DAILY BEFORE MEALS AND AT BEDTIME      Bydureon BCise 2 MG/0.85ML auto-injector injection  Generic drug: exenatide er   2 mg, Subcutaneous, Weekly      calcium citrate 950 (200 Ca) MG tablet  Commonly known as: CALCITRATE   950 mg, Oral, Daily      docusate sodium 100 MG capsule  Commonly known as: COLACE   100 mg, Oral, Daily      DULoxetine 60 MG capsule  Commonly known as: CYMBALTA   60 mg, Oral, 2 Times Daily      famotidine 40 MG tablet  Commonly known  as: PEPCID   40 mg, Oral, Every Morning      Farxiga 5 MG tablet tablet  Generic drug: dapagliflozin   5 mg, Oral, Daily      ferrous gluconate 324 MG tablet  Commonly known as: FERGON   324 mg, Oral, Daily With Breakfast      finasteride 5 MG tablet  Commonly known as: PROSCAR   5 mg, Oral, Daily      Fluticasone-Salmeterol 500-50 MCG/ACT DISKUS  Commonly known as: ADVAIR/WIXELA   1 puff, Inhalation, 2 Times Daily - RT      folic acid 1 MG tablet  Commonly known as: FOLVITE   1 mg, Oral, Daily      FreeStyle Bethany 2 Vilas device   No dose, route, or frequency recorded.      FreeStyle Bethany 2 Sensor misc   USE every 14 DAYS      gabapentin 300 MG capsule  Commonly known as: NEURONTIN   TAKE ONE CAPSULE BY MOUTH EVERY MORNING AND TAKE TWO CAPSULES BY MOUTH EVERY NIGHT AT BEDTIME      glucose blood test strip   1 each, Other, Daily, Dx:   E11.40      Insulin Lispro (1 Unit Dial) 100 UNIT/ML solution pen-injector  Commonly known as: HUMALOG   inject EIGHT units UNDER THE SKIN INTO THE APPROPRIATE AREA THREE TIMES DAILY PER sliding scale, IF BLOOD SUGAR < 160= 0 units, 161-220= 2 units, 221-280= 4 units, 281-340 = SIX units, 341-400 = EIGHT units      Lantus SoloStar 100 UNIT/ML injection pen  Generic drug: Insulin Glargine   50 Units, Subcutaneous, Daily      magnesium oxide 400 tablet tablet  Commonly known as: MAG-OX   400 mg, Oral, Daily, Started by Flaget       montelukast 10 MG tablet  Commonly known as: SINGULAIR   10 mg, Oral, Every Night at Bedtime      NIFEdipine XL 30 MG 24 hr tablet  Commonly known as: PROCARDIA XL   30 mg, Oral, Every Morning      O2  Commonly known as: OXYGEN   2 Liter O2 - CONTINUOUS (route: Oxygen)      ondansetron ODT 4 MG disintegrating tablet  Commonly known as: ZOFRAN-ODT   PLACE 1 TABLET ON THE TONGUE EVERY 8 HOURS AS NEEDED FOR NAUSEA AND VOMITING      Ozempic (1 MG/DOSE) 4 MG/3ML solution pen-injector  Generic drug: Semaglutide (1 MG/DOSE)   1 mg, Subcutaneous, Weekly       TRUEplus Lancets 28G misc   USE AS DIRECTED      Ventolin  (90 Base) MCG/ACT inhaler  Generic drug: albuterol sulfate HFA   INHALE 2 PUFFS FOUR TIMES DAILY AS NEEDED FOR WHEEZING      Vitamin D3 50 MCG (2000 UT) tablet   50 mcg, Oral, Daily             Stop These Medications      metoprolol tartrate 100 MG tablet  Commonly known as: LOPRESSOR               Additional Instructions for the Follow-ups that You Need to Schedule       Discharge Follow-up with PCP   As directed       Currently Documented PCP:    Kimmy Riley MD    PCP Phone Number:    [phone]     Follow Up Details: Follow-up with PCP in 7 days.               Contact information for follow-up providers       Hugo Kline MD Follow up on 1/23/2024.    Specialty: Orthopedic Surgery  Contact information:  [address] [facility] [address]  [address]  [place] [id number] [phone]               Kimmy Riley MD .    Specialty: Family Medicine  Why: Follow-up with PCP in 7 days.  Contact information:  [address] [facility]  [address]  [facility] [address] [phone]                       Contact information for after-discharge care       Home Medical Care       [facility] .    Services: Home Living Aide Services, Home Medical , Home Nursing, Home Rehabilitation  Contact information:  [address], [address]  [facility] [id number] [phone]                                  Future Appointments   Date Time Provider Department Center   1/11/2024  3:45 PM Severiano Carolina MD [facility]   1/17/2024 10:30 AM Nilton Sandoval MD [facility] BAR [facility]AU[facility]   1/30/2024 10:30 AM Cindy Crabtree APRN [facility] CD BARDS [facility]   2/13/2024  9:15 AM Kimmy Riley MD [facility] PC BARDS VAUGHN   3/8/2024 10:40 AM Neli Paredes APRN [facility] DIAB BT VAUGHN   3/21/2024 10:00 AM Rashad Chi MD [facility] BARD OSIMN   5/20/2024 10:30 AM [facility]        Shabbir Peraza,  MD [facility]  12/28/23    Discharge time spent greater than 30 minutes.

## 2023-12-28 NOTE — PROGRESS NOTES
[facility] Progress Note      Patient Name: Preston Wallis  : 1965  MRN: [id number]  Primary Care Physician:  Kimmy Riley MD  Date of admission: 2023    Subjective     Interval History:   F/u CKD3B    Review of Systems:   RN gathered from pharmacy yesterday patient been off bumex since 2022  Denies dyspnea or nausea     Objective     Vitals:   Temp:  [96.7 °F (35.9 °C)-97.7 °F (36.5 °C)] 97.6 °F (36.4 °C)  Heart Rate:  [79-85] 85  Resp:  [16] 16  BP: (117-156)/(70-74) 145/70  Flow (L/min):  [2] 2    Intake/Output Summary (Last 24 hours) at 2023 0719  Last data filed at 2023 0623  Gross per 24 hour   Intake 1200 ml   Output 3150 ml   Net -1950 ml       Physical Exam:    General Appearance: alert, comfortable  HEENT: oral mucosa normal, nonicteric sclera  Neck: supple, no JVD  Lungs: CTA bilat no rales  Heart: RRR, normal S1 and S2  Abdomen: soft, nontender, nondistended  Extremities: trace BLE edema, no cyanosis or clubbing  Neuro: normal speech and mental status     Scheduled Meds:     apixaban, 5 mg, Oral, Q12H  atorvastatin, 20 mg, Oral, Nightly  budesonide-formoterol, 2 puff, Inhalation, BID - RT  calcium carbonate (oyster shell), 500 mg, Oral, Daily  carvedilol, 25 mg, Oral, Q12H  castor oil-balsam peru, 1 application , Topical, Q12H  cholecalciferol, 2,000 Units, Oral, Daily  DULoxetine, 60 mg, Oral, BID  famotidine, 40 mg, Oral, QAM  ferrous sulfate, 325 mg, Oral, BID With Meals  finasteride, 5 mg, Oral, Daily  folic acid, 1 mg, Oral, Daily  gabapentin, 300 mg, Oral, Q12H  hydrocortisone-bacitracin-zinc oxide-nystatin, 1 application , Topical, Q12H  insulin glargine, 50 Units, Subcutaneous, Daily  insulin lispro, 2-7 Units, Subcutaneous, 4x Daily AC & at Bedtime  insulin lispro, 8 Units, Subcutaneous, TID AC  magnesium oxide, 400 mg, Oral, Daily  montelukast, 10 mg, Oral, Nightly  mupirocin, 1 application , Topical, Q12H  NIFEdipine XL, 30 mg,  Oral, QAM  O2, 2 L/min, Inhalation, Once  senna-docusate sodium, 2 tablet, Oral, BID  sodium chloride, 10 mL, Intravenous, Q12H      IV Meds:        Results Reviewed:   I have personally reviewed the results from the time of this admission to 12/28/2023 07:19 EST     Results from last 7 days   Lab Units 12/28/23  0603 12/27/23 0425 12/26/23  0541 12/24/23  0616 12/23/23  0620 12/22/23  0431   SODIUM mmol/L 139 135* 141 139 137 136   POTASSIUM mmol/L 4.3 4.5 4.3 4.3 4.7 4.5   CHLORIDE mmol/L 101 96* 102 101 98 99   CO2 mmol/L 28.5 30.2* 29.2* 29.4* 29.9* 30.0*   BUN mg/dL 37* 37* 29* 28* 26* 26*   CREATININE mg/dL 1.78* 2.00* 1.48* 1.77* 1.43* 1.52*   CALCIUM mg/dL 8.9 9.1 9.0 8.9 9.1 9.0   BILIRUBIN mg/dL  --   --   --  0.3 0.2 0.3   ALK PHOS U/L  --   --   --  155* 168* 149*   ALT (SGPT) U/L  --   --   --  <5 12 16   AST (SGOT) U/L  --   --   --  16 18 15   GLUCOSE mg/dL 77 143* 81 102* 191* 110*     Estimated Creatinine Clearance: 59.6 mL/min (A) (by C-G formula based on SCr of 1.78 mg/dL (H)).  Results from last 7 days   Lab Units 12/28/23  0603 12/27/23 0425 12/26/23  0541   PHOSPHORUS mg/dL 3.6 3.3 2.7         Results from last 7 days   Lab Units 12/28/23  0603 12/27/23  0425 12/26/23  0541 12/24/23  0616 12/23/23  0440   WBC 10*3/mm3 11.04* 12.04* 9.86 10.24 9.25   HEMOGLOBIN g/dL 9.1* 9.2* 9.7* 9.3* 10.0*   PLATELETS 10*3/mm3 481* 466* 498* 439 448           Assessment / Plan     ASSESSMENT:  CKD stage 3B - diabetic nephropathy with subnephrotic proteinuria 2.8gm.  Also obs uropathy related to neurogenic bladder.  Dr Martínez follows.  Cr labile, climbed some to 2.0 with ARB resumption but down to 1.7 today and need to continue losartan to target proteinuria. BL Cr mid 1's.  Lytes stable.  Resume farxiga at discharge  HTN - improved, changed metop to coreg, resumed ARB, on low dose nifedipine 30 mg, continue for now, but if able to titrate ARB further may be able to stop CCB later (given edema)  Neurogenic  bladder, self caths at home, currently with lopez here   Proximal left tibia and fibula fractures s/p surgical repair on 12/22/2023   DM2 w/ complications (gastroparesis, neuropathy, nephropathy ); mgmt per [facility]  Edema - mild, with low alb (3) and 3rd spacing; clarified been off bumex since June 2022 - no absolute need to resume right now  PAF on AC (eliquis)  Anemia of CKD, hgb stable 9.1    PLAN:  Continue losartan 25 mg   Resume farxiga upon discharge  No objection to DC to rehab  Will arrange nephrology follow up 2 to 4 weeks with Dr Tanya Onofre MD  12/28/23  07:19 [facility]    [facility]  [phone]

## 2023-12-28 NOTE — OUTREACH NOTE
Prep Survey      Flowsheet Row Responses   [facility] facility patient discharged from? [place]   Is LACE score < 7 ? No   Eligibility Not Eligible   What are the reasons patient is not eligible? [facility]  [[facility]]   Does the patient have one of the following disease processes/diagnoses(primary or secondary)? Other   Prep survey completed? Yes            ANA CASSIDY - Registered Nurse

## 2023-12-29 NOTE — PROGRESS NOTES
Continued Stay Note  [facility]     Patient Name: Preston Wallis  MRN: [id number]  Today's Date: 12/28/2023    Admit Date: 12/14/2023    Plan: [facility] -- Accepted.   Discharge Plan       Row Name 12/28/23 2103       Plan    Final Discharge Disposition Code 03 - skilled nursing facility (SNF)    Final Note Pt d/c'ed to [facility], [facility] transport                   Discharge Codes    No documentation.                 Expected Discharge Date and Time       Expected Discharge Date Expected Discharge Time    Dec 28, 2023               Judy Holman RN

## 2024-01-04 ENCOUNTER — PATIENT OUTREACH (OUTPATIENT)
Dept: CASE MANAGEMENT | Facility: OTHER | Age: 59
End: 2024-01-04
Payer: COMMERCIAL

## 2024-01-04 ENCOUNTER — TRANSCRIBE ORDERS (OUTPATIENT)
Dept: ADMINISTRATIVE | Facility: HOSPITAL | Age: 59
End: 2024-01-04
Payer: COMMERCIAL

## 2024-01-04 DIAGNOSIS — S82.142A CLOSED FRACTURE OF LEFT TIBIAL PLATEAU, INITIAL ENCOUNTER: Primary | ICD-10-CM

## 2024-01-04 DIAGNOSIS — D64.9 ANEMIA, UNSPECIFIED TYPE: ICD-10-CM

## 2024-01-04 DIAGNOSIS — N18.31 CHRONIC KIDNEY DISEASE (CKD) STAGE G3A/A1, MODERATELY DECREASED GLOMERULAR FILTRATION RATE (GFR) BETWEEN 45-59 ML/MIN/1.73 SQUARE METER AND ALBUMINURIA CREATININE RATIO LESS THAN 30 MG/G (CMS/H*: Primary | ICD-10-CM

## 2024-01-08 ENCOUNTER — TELEPHONE (OUTPATIENT)
Dept: DIABETES SERVICES | Facility: HOSPITAL | Age: 59
End: 2024-01-08
Payer: COMMERCIAL

## 2024-01-08 NOTE — TELEPHONE ENCOUNTER
RICH URIARTE Key: [id number] - PA Case ID: [id number] - Rx #: [id number]  FreeStyle Bethany 2 Sensor

## 2024-01-11 ENCOUNTER — OFFICE VISIT (OUTPATIENT)
Dept: PULMONOLOGY | Facility: CLINIC | Age: 59
End: 2024-01-11
Payer: COMMERCIAL

## 2024-01-11 VITALS
WEIGHT: 272 LBS | HEART RATE: 82 BPM | BODY MASS INDEX: 40.29 KG/M2 | RESPIRATION RATE: 18 BRPM | HEIGHT: 69 IN | DIASTOLIC BLOOD PRESSURE: 59 MMHG | OXYGEN SATURATION: 99 % | SYSTOLIC BLOOD PRESSURE: 128 MMHG

## 2024-01-11 DIAGNOSIS — F17.201 TOBACCO ABUSE, IN REMISSION: ICD-10-CM

## 2024-01-11 DIAGNOSIS — J47.9 BRONCHIECTASIS WITHOUT COMPLICATION: ICD-10-CM

## 2024-01-11 DIAGNOSIS — E66.01 CLASS 3 OBESITY: ICD-10-CM

## 2024-01-11 DIAGNOSIS — J44.9 CHRONIC OBSTRUCTIVE PULMONARY DISEASE, UNSPECIFIED COPD TYPE: ICD-10-CM

## 2024-01-11 DIAGNOSIS — G47.33 OBSTRUCTIVE SLEEP APNEA: ICD-10-CM

## 2024-01-11 DIAGNOSIS — Z87.01 HISTORY OF PSEUDOMONAS PNEUMONIA: ICD-10-CM

## 2024-01-11 DIAGNOSIS — R05.3 CHRONIC COUGH: Primary | ICD-10-CM

## 2024-01-11 RX ORDER — BUMETANIDE 2 MG/1
TABLET ORAL
COMMUNITY
Start: 2023-12-19

## 2024-01-11 RX ORDER — FUROSEMIDE 40 MG/1
40 TABLET ORAL DAILY
COMMUNITY
Start: 2024-01-06

## 2024-01-11 RX ORDER — POTASSIUM CHLORIDE 750 MG/1
TABLET, EXTENDED RELEASE ORAL
COMMUNITY
Start: 2024-01-04

## 2024-01-11 RX ORDER — ONDANSETRON 4 MG/1
TABLET, FILM COATED ORAL
COMMUNITY
Start: 2023-12-28

## 2024-01-11 RX ORDER — ERTUGLIFLOZIN 5 MG/1
TABLET, FILM COATED ORAL
COMMUNITY
Start: 2023-12-28

## 2024-01-11 RX ORDER — METOPROLOL TARTRATE 50 MG/1
TABLET, FILM COATED ORAL
COMMUNITY
Start: 2023-11-19

## 2024-01-11 NOTE — PROGRESS NOTES
Primary Care Provider  Kimmy Riley MD     Referring Provider  No ref. provider found     Chief Complaint  COPD, Shortness of Breath, Wheezing, Cough, and Follow-up (Pt here for 11 month follow up)    Subjective          History of Presenting Illness  D8-year-old bronchiectasis, recurrent Pseudomonas infection and chronic respiratory failure here for follow-up.  Patient was hospitalized for MRSA pneumonia and was complicated by a tib-fib fracture that had to be repaired.  Is currently at rehab.  It appears that rehab they are giving him both Advair and Breztri.  He feels like his respiratory status is at baseline.  He does wear continuous 2 L of oxygen.  He does have a flutter valve which she is using there.  He gets short of breath walking but has not been able to walk since undergoing rehab.  Has chronic cough in the morning started with a cloudy secretions.  Denies any wheezing.  He continues uses BiPAP nightly with naps without issues.  Does have oxygen bled into it.  Last had a PFTs prior to his hospitalization and were at baseline.  Patient denies fever, chills, night sweats, swollen glands in the head and neck, unintentional weight loss, hemoptysis, purulent sputum production, dysphagia, chest pain, palpitations, chest tightness, abdominal pain, nausea, vomiting, and diarrhea.  Patient also denies any myalgias, changes in sense of taste and/or smell, sore throat, any other coronavirus or flu-like symptoms.  Patient denies any leg swelling, orthopnea, paroxysmal nocturnal dyspnea.  Patient is able to perform activities of daily living.       Family History   Problem Relation Age of Onset    Coronary artery disease Mother     Hypertension Mother     Diabetes type II Mother     Asthma Father     Diabetes type II Sister     Cancer Sister         Social History     Socioeconomic History    Marital status:    Tobacco Use    Smoking status: Former     Packs/day: 1.00     Years: 12.00     Additional  pack years: 0.00     Total pack years: 12.00     Types: Cigarettes     Start date:      Quit date:      Years since quittin.0    Smokeless tobacco: Never   Vaping Use    Vaping Use: Never used   Substance and Sexual Activity    Alcohol use: Not Currently    Drug use: Never    Sexual activity: Defer        Past Medical History:   Diagnosis Date    Age-related cognitive decline     Allergic contact dermatitis     Allergies     Anemia     Bronchiectasis with acute lower respiratory infection     Charcot foot due to diabetes mellitus 9/10/2013    Chronic diastolic (congestive) heart failure     Chronic kidney disease     Chronic respiratory failure with hypoxia     Closed supracondylar fracture of femur 2022    COPD (chronic obstructive pulmonary disease)     Deep vein thrombosis (DVT) of lower extremity associated with air travel 2023    Dependence on supplemental oxygen     Eczema     Erectile dysfunction     due to organic reasons    Essential (primary) hypertension     Fracture     closed fracture of other tarsal and metatarsal bones    Fracture of proximal humerus 2023    GERD without esophagitis     High risk medication use     Hypercholesteremia     Hypomagnesemia     Infected stasis ulcer of left lower extremity 2023    Insomnia     Low back pain     Major depressive disorder     Morbid (severe) obesity due to excess calories     MRSA pneumonia     Muscle weakness     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 without evidence of necrosis     Obstructive sleep apnea (adult) (pediatric)     Other forms of dyspnea     Other long term (current) drug therapy     Other specified noninfective gastroenteritis and colitis     Other spondylosis, lumbar region     Pain in both knees     Paroxysmal atrial fibrillation     Peripheral neuropathy     attributed to type 2 diabetes    Pneumonia, unspecified organism     Polyneuropathy     Rash and other  nonspecific skin eruption     Syncope and collapse     Tachycardia     Tinnitus 1/13/2023    Type 1 diabetes mellitus with diabetic chronic kidney disease     Type 2 diabetes mellitus     Unspecified fall, initial encounter     Urinary retention         Immunization History   Administered Date(s) Administered    Flu Vaccine Quad PF >36MO 10/18/2016, 10/16/2017, 11/04/2019    Flu Vaccine Split Quad 11/04/2019    Fluzone (or Fluarix & Flulaval for VFC) >6mos 10/18/2016, 10/16/2017, 11/04/2019, 10/19/2022, 11/14/2023    Influenza Injectable Mdck Pf Quad 10/19/2022    Influenza Quad Vaccine (Inpatient) 09/19/2013    Influenza, Unspecified 09/21/2020    PEDS-Pneumococcal Conjugate (PCV7) 11/14/2023    Pneumococcal Conjugate 20-Valent (PCV20) 11/14/2023    Pneumococcal Polysaccharide (PPSV23) 11/20/1997    Tdap 09/18/2018    influenza Split 11/04/2019       Allergies   Allergen Reactions    Benadryl [Diphenhydramine] Itching    Proventil [Albuterol] Other (See Comments)     Mouth sores            Current Outpatient Medications:     apixaban (Eliquis) 5 MG tablet tablet, Take 1 tablet by mouth Every 12 (Twelve) Hours., Disp: 180 tablet, Rfl: 2    atorvastatin (LIPITOR) 20 MG tablet, Take 1 tablet by mouth Every Night., Disp: 90 tablet, Rfl: 2    B-D UF III MINI PEN NEEDLES 31G X 5 MM misc, USE FOUR TIMES DAILY BEFORE MEALS AND AT BEDTIME, Disp: 360 each, Rfl: 1    Breztri Aerosphere 160-9-4.8 MCG/ACT aerosol inhaler, INHALE 2 PUFFS TWICE DAILY. RINSE MOUTH OUT AFTER EACH USE, Disp: 10.7 g, Rfl: 11    bumetanide (BUMEX) 2 MG tablet, , Disp: , Rfl:     calcium citrate (CALCITRATE) 950 (200 Ca) MG tablet, Take 1 tablet by mouth Daily., Disp: 90 tablet, Rfl: 2    carvedilol (COREG) 25 MG tablet, Take 1 tablet by mouth Every 12 (Twelve) Hours., Disp: 60 tablet, Rfl: 0    Cholecalciferol (Vitamin D3) 50 MCG (2000 UT) tablet, Take 1 tablet by mouth Daily., Disp: 90 tablet, Rfl: 2    Continuous Blood Gluc  (FreeStyle Bethany  2 Harrell) device, , Disp: , Rfl:     Continuous Blood Gluc Sensor (FreeStyle Bethany 2 Sensor) misc, USE every 14 DAYS, Disp: 2 each, Rfl: 11    dapagliflozin (Farxiga) 5 MG tablet tablet, Take 1 tablet by mouth Daily., Disp: 90 tablet, Rfl: 2    docusate sodium (COLACE) 100 MG capsule, Take 1 capsule by mouth Daily., Disp: 90 capsule, Rfl: 2    DULoxetine (CYMBALTA) 60 MG capsule, Take 1 capsule by mouth 2 (Two) Times a Day., Disp: 180 capsule, Rfl: 2    exenatide er (Bydureon BCise) 2 MG/0.85ML auto-injector injection, Inject 0.85 mL under the skin into the appropriate area as directed 1 (One) Time Per Week., Disp: , Rfl:     famotidine (PEPCID) 40 MG tablet, Take 1 tablet by mouth Every Morning., Disp: 90 tablet, Rfl: 2    ferrous gluconate (FERGON) 324 MG tablet, Take 1 tablet by mouth Daily With Breakfast., Disp: 90 tablet, Rfl: 2    finasteride (PROSCAR) 5 MG tablet, Take 1 tablet by mouth Daily., Disp: 90 tablet, Rfl: 2    folic acid (FOLVITE) 1 MG tablet, Take 1 tablet by mouth Daily., Disp: 90 tablet, Rfl: 2    furosemide (LASIX) 40 MG tablet, Take 1 tablet by mouth Daily., Disp: , Rfl:     gabapentin (NEURONTIN) 300 MG capsule, TAKE ONE CAPSULE BY MOUTH EVERY MORNING AND TAKE TWO CAPSULES BY MOUTH EVERY NIGHT AT BEDTIME, Disp: 270 capsule, Rfl: 2    glucose blood test strip, 1 each by Other route Daily. Dx:   E11.40, Disp: 100 each, Rfl: 4    Insulin Glargine (Lantus SoloStar) 100 UNIT/ML injection pen, Inject 50 Units under the skin into the appropriate area as directed Daily., Disp: 15 mL, Rfl: 0    Insulin Lispro, 1 Unit Dial, (HUMALOG) 100 UNIT/ML solution pen-injector, inject EIGHT units UNDER THE SKIN INTO THE APPROPRIATE AREA THREE TIMES DAILY PER sliding scale, IF BLOOD SUGAR < 160= 0 units, 161-220= 2 units, 221-280= 4 units, 281-340 = SIX units, 341-400 = EIGHT units, Disp: 15 mL, Rfl: 1    losartan (COZAAR) 25 MG tablet, Take 1 tablet by mouth Daily., Disp: 30 tablet, Rfl: 0    magnesium oxide  "(MAG-OX) 400 tablet tablet, Take 1 tablet by mouth Daily. Started by [place], Disp: , Rfl:     metoprolol tartrate (LOPRESSOR) 50 MG tablet, , Disp: , Rfl:     montelukast (SINGULAIR) 10 MG tablet, Take 1 tablet by mouth every night at bedtime., Disp: 90 tablet, Rfl: 2    NIFEdipine XL (PROCARDIA XL) 30 MG 24 hr tablet, Take 1 tablet by mouth Every Morning., Disp: 90 tablet, Rfl: 2    O2 (OXYGEN), 2 Liter O2 - CONTINUOUS (route: Oxygen), Disp: , Rfl:     ondansetron (ZOFRAN) 4 MG tablet, , Disp: , Rfl:     ondansetron ODT (ZOFRAN-ODT) 4 MG disintegrating tablet, PLACE 1 TABLET ON THE TONGUE EVERY 8 HOURS AS NEEDED FOR NAUSEA AND VOMITING, Disp: 10 tablet, Rfl: 0    potassium chloride (K-DUR,KLOR-CON) 10 MEQ CR tablet, , Disp: , Rfl:     Semaglutide, 1 MG/DOSE, (Ozempic, 1 MG/DOSE,) 4 MG/3ML solution pen-injector, Inject 1 mg under the skin into the appropriate area as directed 1 (One) Time Per Week., Disp: 3 mL, Rfl: 5    silver sulfadiazine (SILVADENE, SSD) 1 % cream, , Disp: , Rfl:     Steglatro 5 MG tablet, , Disp: , Rfl:     TRUEplus Lancets 28G misc, USE AS DIRECTED, Disp: 100 each, Rfl: 0    Ventolin  (90 Base) MCG/ACT inhaler, INHALE 2 PUFFS FOUR TIMES DAILY AS NEEDED FOR WHEEZING, Disp: 18 g, Rfl: 11     Objective     Physical Exam  Vital Signs:   WDWN, Alert, NAD.  Patient sitting in a wheelchair.   HEENT:  PERRL, EOMI.  OP, nares clear  Chest:   Mildly decreased breath sounds throughout.  Rhonchi bilaterally.  Normal work of breathing noted.  Patient is able speak full sentences without difficulty.  CV: RRR, no MGR, pulses 2+, equal.  Abd:  Soft, NT, ND, + BS, no HSM, obese  EXT:  no clubbing, no cyanosis, BLE edema, lateral incision left calf noted  Neuro:  A&Ox3, CN grossly intact, no focal deficits.  Skin: No rashes or lesions noted.    /59 (BP Location: Left arm, Patient Position: Sitting, Cuff Size: Adult)   Pulse 82   Resp 18   Ht 175.3 cm (69\")   Wt 123 kg (272 lb) Comment: Pt stated " weight verbally, no weight bearing on left leg/pt broke it / then had surgery  SpO2 99% Comment: nasal cannula/ 2 liters continous  BMI 40.17 kg/m²         Result Review :   I reviewed our services last office note.  I reviewed last set of PFTs showing very severe airflow obstruction.  I reviewed hospital records from [facility] including chest CT which showed diffuse and chronic bronchiectasis.         Assessment and Plan      Assessment:  1.  Very severe COPD with an FEV1 of 26  %  2.  Bronchiectasis without exacerbation.  3.  History of recurrent Pseudomonas infection with MDRO Pseudomonas  4.  Chronic dyspnea.  At baseline  5.  Chronic cough.  At baseline  6.  Chronic wheezing.  At baseline  7.  GERD.  8.  History of PE in July 2019 on Xarelto without cor pulmonale.  9.  Obstructive sleep apnea: On BiPAP machine.    10.  Tobacco abuse of cigarettes in remission.  Class III obesity BMI 40.1  Recent tib-fib fracture        Plan:  It appears on reviewing the medication record it is rehab facility he is getting both Advair and Breztri.  I will DC Advair.  Continue Breztri 2 puffs twice a day.  Has albuterol as needed.  Inhaler education provided today.  Continue BiPAP nightly with naps on current settings with oxygen bled into it  Wean O2 to keep SPO2 greater than 90%  Continue Pepcid  Continue rehab process.  If patient is doing well after rehab, it would be ideal for him to go to pulmonary rehab  PFTs were personally reviewed with severe airflow obstruction consistent with his bronchiectasis.  I also reviewed the patient's  Continue flutter valve as needed for airway clearance  Diet and exercise counseling provided today.  Recommended 30 minutes daily exercise well is an 1800 -calorie/day diet.  Patient verbalized understanding.  Total time counseling the patient today was 4 minutes  Vaccination status:  patient reports they are up-to-date with flu and Prevnar 20  Smoking status: Patient is a former  cigarette smoker.  Not eligible for lung cancer screening as patient reports he quit smoking in 1993.    Follow Up   Return in about 3 months (around 4/11/2024).  Patient was given instructions and counseling regarding his condition or for health maintenance advice. Please see specific information pulled into the AVS if appropriate.     Electronically signed by Severiano Carolina MD, 01/11/24, 4:31 PM EST.

## 2024-01-17 ENCOUNTER — TELEPHONE (OUTPATIENT)
Dept: OTOLARYNGOLOGY | Facility: CLINIC | Age: 59
End: 2024-01-17

## 2024-01-25 ENCOUNTER — PATIENT OUTREACH (OUTPATIENT)
Dept: CASE MANAGEMENT | Facility: OTHER | Age: 59
End: 2024-01-25
Payer: COMMERCIAL

## 2024-01-25 DIAGNOSIS — G62.9 PERIPHERAL POLYNEUROPATHY: Primary | ICD-10-CM

## 2024-01-25 NOTE — OUTREACH NOTE
AMBULATORY CASE MANAGEMENT NOTE    Name and Relationship of Patient/Support Person:  -     Chart review - Gómez has cancelled his follow up with Dr. Sandoval a couple of times.  The last reason in chart states - patient does not wish to reschedule.    I asked Kami the first time the appointment was cancelled, she said (after the nuclear scan) - well he doesn't have cancer.  Explained that he still needs to get to the bottom of the hyperparathyroid disorder, so she allowed me to reschedule.  Now cancelled again.  He has an appointment 2/13/24 - can address at that time.     Education Documentation  No documentation found.        Tara GOMEZ  Ambulatory Case Management    1/25/2024, 15:21 EST

## 2024-01-27 DIAGNOSIS — E11.65 UNCONTROLLED TYPE 2 DIABETES MELLITUS WITH HYPERGLYCEMIA: ICD-10-CM

## 2024-01-29 RX ORDER — FLURBIPROFEN SODIUM 0.3 MG/ML
SOLUTION/ DROPS OPHTHALMIC
Qty: 400 EACH | Refills: 1 | Status: SHIPPED | OUTPATIENT
Start: 2024-01-29

## 2024-01-30 ENCOUNTER — OFFICE VISIT (OUTPATIENT)
Dept: CARDIOLOGY | Facility: CLINIC | Age: 59
End: 2024-01-30
Payer: COMMERCIAL

## 2024-01-30 VITALS
WEIGHT: 282 LBS | DIASTOLIC BLOOD PRESSURE: 69 MMHG | BODY MASS INDEX: 41.77 KG/M2 | HEART RATE: 87 BPM | SYSTOLIC BLOOD PRESSURE: 132 MMHG | HEIGHT: 69 IN

## 2024-01-30 DIAGNOSIS — I48.0 PAROXYSMAL ATRIAL FIBRILLATION: ICD-10-CM

## 2024-01-30 DIAGNOSIS — I10 ESSENTIAL HYPERTENSION: ICD-10-CM

## 2024-01-30 DIAGNOSIS — I50.32 CHRONIC DIASTOLIC HEART FAILURE: Primary | ICD-10-CM

## 2024-01-30 RX ORDER — FLUTICASONE PROPIONATE AND SALMETEROL 500; 50 UG/1; UG/1
POWDER RESPIRATORY (INHALATION)
COMMUNITY

## 2024-01-30 NOTE — PROGRESS NOTES
Chief Complaint  Congestive Heart Failure    Subjective        History of Present Illness  Preston Wallis presents to [facility] CARDIOLOGY   Mr. Wallis is a 58-year-old male patient coming in today for routine 6-month cardiac follow-up.  He had recent hospital stay for pneumonia, once he was ready to discharge, unfortunately he had a fall during the discharge process, and was subsequently readmitted for tibial fracture which required surgical repair.  And he was discharged to nursing home for rehab where he is still currently residing.  He is in the office today in a wheelchair is accompanied by his wife.  Denies any cardiac concerns today.  There is no been any change to his chronic shortness of breath, and he continues to wear oxygen via nasal cannula.  He has no complaints of chest pains, palpitations, lightheadedness or dizziness.  No recent weight gain or change to  pedal edema.  Beta-blocker therapy was changed from metoprolol to carvedilol during the course of his admission.      Past History:     Chronic diastolic heart failure  Paroxysmal atrial fibrillation  Chronic kidney disease  COPD  Hypertension  Sleep apnea  Diabetes mellitus  Neurogenic bladder, currently doing intermittent self catheterizations    Past Medical History:   Diagnosis Date    Age-related cognitive decline     Allergic contact dermatitis     Allergies     Anemia     Bronchiectasis with acute lower respiratory infection     Charcot foot due to diabetes mellitus 9/10/2013    Chronic diastolic (congestive) heart failure     Chronic kidney disease     Chronic respiratory failure with hypoxia     Closed supracondylar fracture of femur 1/12/2022    COPD (chronic obstructive pulmonary disease)     Deep vein thrombosis (DVT) of lower extremity associated with air travel 1/13/2023    Dependence on supplemental oxygen     Eczema     Erectile dysfunction     Essential (primary) hypertension     Fracture     Fracture of proximal  humerus 1/13/2023    GERD without esophagitis     High risk medication use     Hypercholesteremia     Hypomagnesemia     Infected stasis ulcer of left lower extremity 1/13/2023    Insomnia     Low back pain     Major depressive disorder     Morbid (severe) obesity due to excess calories     MRSA pneumonia     Muscle weakness     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 without evidence of necrosis     Obstructive sleep apnea (adult) (pediatric)     Other forms of dyspnea     Other long term (current) drug therapy     Other specified noninfective gastroenteritis and colitis     Other spondylosis, lumbar region     Pain in both knees     Paroxysmal atrial fibrillation     Peripheral neuropathy     Pneumonia, unspecified organism     Polyneuropathy     Rash and other nonspecific skin eruption     Syncope and collapse     Tachycardia     Tinnitus 1/13/2023    Type 1 diabetes mellitus with diabetic chronic kidney disease     Type 2 diabetes mellitus     Unspecified fall, initial encounter     Urinary retention        Allergies   Allergen Reactions    Benadryl [Diphenhydramine] Itching    Proventil [Albuterol] Other (See Comments)     Mouth sores          Past Surgical History:   Procedure Laterality Date    CHOLECYSTECTOMY      CYSTOSCOPY      FEMUR SURGERY Left     Shravan placed    KNEE SURGERY Left     OTHER SURGICAL HISTORY Left     venous port    TIBIAL PLATEAU OPEN REDUCTION INTERNAL FIXATION Left 12/22/2023    Procedure: TIBIAL PLATEAU OPEN REDUCTION INTERNAL FIXATION;  Surgeon: Hugo Kline MD;  Location: [facility];  Service: Orthopedics;  Laterality: Left;    TONSILLECTOMY AND ADENOIDECTOMY          Social History  He  reports that he quit smoking about 31 years ago. His smoking use included cigarettes. He started smoking about 43 years ago. He has a 12.00 pack-year smoking history. He has never used smokeless tobacco. He reports that he does not currently  use alcohol. He reports that he does not use drugs.    Family History  His family history includes Asthma in his father; Cancer in his sister; Coronary artery disease in his mother; Diabetes type II in his mother and sister; Hypertension in his mother.       Current Outpatient Medications on File Prior to Visit   Medication Sig    apixaban (Eliquis) 5 MG tablet tablet Take 1 tablet by mouth Every 12 (Twelve) Hours.    B-D UF III MINI PEN NEEDLES 31G X 5 MM misc USE FOUR TIMES DAILY BEFORE MEALS AND AT BEDTIME    Breztri Aerosphere 160-9-4.8 MCG/ACT aerosol inhaler INHALE 2 PUFFS TWICE DAILY. RINSE MOUTH OUT AFTER EACH USE    bumetanide (BUMEX) 2 MG tablet     calcium citrate (CALCITRATE) 950 (200 Ca) MG tablet Take 1 tablet by mouth Daily.    carvedilol (COREG) 25 MG tablet Take 1 tablet by mouth Every 12 (Twelve) Hours.    Cholecalciferol (Vitamin D3) 50 MCG (2000 UT) tablet Take 1 tablet by mouth Daily.    Continuous Blood Gluc  (FreeStyle Bethany 2 Camilla) device     Continuous Blood Gluc Sensor (FreeStyle Bethany 2 Sensor) misc USE every 14 DAYS    dapagliflozin (Farxiga) 5 MG tablet tablet Take 1 tablet by mouth Daily.    DULoxetine (CYMBALTA) 60 MG capsule Take 1 capsule by mouth 2 (Two) Times a Day.    exenatide er (Bydureon BCise) 2 MG/0.85ML auto-injector injection Inject 0.85 mL under the skin into the appropriate area as directed 1 (One) Time Per Week.    famotidine (PEPCID) 40 MG tablet Take 1 tablet by mouth Every Morning.    ferrous gluconate (FERGON) 324 MG tablet Take 1 tablet by mouth Daily With Breakfast.    finasteride (PROSCAR) 5 MG tablet Take 1 tablet by mouth Daily.    Fluticasone-Salmeterol (ADVAIR/WIXELA) 500-50 MCG/ACT DISKUS Inhale 2 (Two) Times a Day.    folic acid (FOLVITE) 1 MG tablet Take 1 tablet by mouth Daily.    gabapentin (NEURONTIN) 300 MG capsule TAKE ONE CAPSULE BY MOUTH EVERY MORNING AND TAKE TWO CAPSULES BY MOUTH EVERY NIGHT AT BEDTIME    glucose blood test strip 1 each  by Other route Daily. Dx:   E11.40    Insulin Glargine (Lantus SoloStar) 100 UNIT/ML injection pen Inject 50 Units under the skin into the appropriate area as directed Daily.    Insulin Lispro, 1 Unit Dial, (HUMALOG) 100 UNIT/ML solution pen-injector inject EIGHT units UNDER THE SKIN INTO THE APPROPRIATE AREA THREE TIMES DAILY PER sliding scale, IF BLOOD SUGAR < 160= 0 units, 161-220= 2 units, 221-280= 4 units, 281-340 = SIX units, 341-400 = EIGHT units    losartan (COZAAR) 25 MG tablet Take 1 tablet by mouth Daily.    magnesium oxide (MAG-OX) 400 tablet tablet Take 1 tablet by mouth Daily. Started by Flaget    montelukast (SINGULAIR) 10 MG tablet Take 1 tablet by mouth every night at bedtime.    NIFEdipine XL (PROCARDIA XL) 30 MG 24 hr tablet Take 1 tablet by mouth Every Morning.    O2 (OXYGEN) 2 Liter O2 - CONTINUOUS (route: Oxygen)    potassium chloride (K-DUR,KLOR-CON) 10 MEQ CR tablet     Semaglutide, 1 MG/DOSE, (Ozempic, 1 MG/DOSE,) 4 MG/3ML solution pen-injector Inject 1 mg under the skin into the appropriate area as directed 1 (One) Time Per Week.    silver sulfadiazine (SILVADENE, SSD) 1 % cream     Steglatro 5 MG tablet     TRUEplus Lancets 28G misc USE AS DIRECTED    Ventolin  (90 Base) MCG/ACT inhaler INHALE 2 PUFFS FOUR TIMES DAILY AS NEEDED FOR WHEEZING    docusate sodium (COLACE) 100 MG capsule Take 1 capsule by mouth Daily. (Patient not taking: Reported on 1/30/2024)     No current facility-administered medications on file prior to visit.         Review of Systems   Constitutional:  Negative for fatigue.   Respiratory:  Positive for shortness of breath. Negative for cough and chest tightness.    Cardiovascular:  Positive for leg swelling. Negative for chest pain and palpitations.   Gastrointestinal:  Negative for nausea and vomiting.   Neurological:  Negative for dizziness and syncope.   Hematological:  Does not bruise/bleed easily.        Objective   Vitals:    01/30/24 1032 01/30/24 1040  "  BP: 168/73 132/69  Comment: Home reading from nursing home log   Pulse: 87    Weight: 128 kg (282 lb)    Height: 175.3 cm (69\")          Physical Exam  General : Alert, awake, no acute distress  Neck : Supple, no carotid bruit, no jugular venous distention  CVS : Regular rate and rhythm, no murmur, rubs or gallops  Lungs: Clear to auscultation bilaterally, no crackles or rhonchi  Abdomen: Soft, nontender, bowel sounds active  Extremities: Warm, well-perfused, trace bilateral pedal edema      Result Review     The following data was reviewed by CON Jeong  proBNP   Date Value Ref Range Status   10/05/2022 286.0 0.0 - 900.0 pg/mL Final     CMP          12/26/2023    05:41 12/27/2023    04:25 12/28/2023    06:03   CMP   Glucose 81  143  77    BUN 29  37  37    Creatinine 1.48  2.00  1.78    EGFR 54.5  38.0  43.7    Sodium 141  135  139    Potassium 4.3  4.5  4.3    Chloride 102  96  101    Calcium 9.0  9.1  8.9    Albumin 3.0  2.8  3.0    BUN/Creatinine Ratio 19.6  18.5  20.8    Anion Gap 9.8  8.8  9.5      CBC w/diff          12/26/2023    05:41 12/27/2023    04:25 12/28/2023    06:03   CBC w/Diff   WBC 9.86  12.04  11.04    RBC 3.41  3.25  3.17    Hemoglobin 9.7  9.2  9.1    Hematocrit 30.5  28.6  27.3    MCV 89.4  88.0  86.1    MCH 28.4  28.3  28.7    MCHC 31.8  32.2  33.3    RDW 13.5  13.3  13.2    Platelets 498  466  481    Neutrophil Rel % 46.5  51.5  49.8    Immature Granulocyte Rel % 0.6  0.7  0.7    Lymphocyte Rel % 25.1  22.0  22.2    Monocyte Rel % 16.0  15.0  15.4    Eosinophil Rel % 11.0  10.1  11.2    Basophil Rel % 0.8  0.7  0.7       Lab Results   Component Value Date    TSH 0.239 (L) 11/29/2023      No results found for: \"FREET4\"   No results found for: \"DDIMERQUANT\"  Magnesium   Date Value Ref Range Status   12/14/2023 1.9 1.6 - 2.6 mg/dL Final      No results found for: \"DIGOXIN\"   Lab Results   Component Value Date    TROPONINT 0.130 (C) 04/06/2022           Lipid Panel          9/7/2023 "    15:30   Lipid Panel   Total Cholesterol 130    Triglycerides 205    HDL Cholesterol 48    VLDL Cholesterol 33    LDL Cholesterol  49    LDL/HDL Ratio 0.85        Results for orders placed during the hospital encounter of 04/05/22    Adult Transthoracic Echo Limited W/ Cont if Necessary Per Protocol    Interpretation Summary  · The aortic root measures 3.1 cm.  · Left ventricular ejection fraction appears to be 56 - 60%.  · Left ventricular diastolic function is consistent with (grade I) impaired relaxation.    There were no apparent intracardiac masses, vegetations or thrombi.         Assessment and Plan   Diagnoses and all orders for this visit:    1. Chronic diastolic heart failure (Primary)  Assessment & Plan:  No significant volume overload, he continues to have mild pedal edema.  Continue current dose of Bumex.  He has CKD, and his renal numbers have remained stable.      2. Paroxysmal atrial fibrillation  Assessment & Plan:  Is in sinus rhythm without any complaints of palpitations.  Beta-blocker was changed during recent hospitalization, we will continue carvedilol for rate and rhythm control.  Continue anticoagulation with Eliquis, hemoglobin hematocrit have remained stable.      3. Essential hypertension  Assessment & Plan:  Blood pressure is well-controlled, continue current regimen.              Follow Up   Return in about 6 months (around 7/30/2024) for with Dr. Ware.    Patient was given instructions and counseling regarding his condition or for health maintenance advice. Please see specific information pulled into the AVS if appropriate.     Signed,  Cindy Crabtree, APRN  01/30/2024     Dictated Utilizing Dragon Dictation: Please note that portions of this note were completed with a voice recognition program.  Part of this note may be an electronic transcription/translation of spoken language to printed text using the Dragon Dictation System.

## 2024-01-30 NOTE — ASSESSMENT & PLAN NOTE
Blood pressure is well-controlled, continue current regimen.  
Is in sinus rhythm without any complaints of palpitations.  Beta-blocker was changed during recent hospitalization, we will continue carvedilol for rate and rhythm control.  Continue anticoagulation with Eliquis, hemoglobin hematocrit have remained stable.  
No significant volume overload, he continues to have mild pedal edema.  Continue current dose of Bumex.  He has CKD, and his renal numbers have remained stable.  
PAST MEDICAL HISTORY:  Anemia

## 2024-02-02 ENCOUNTER — TELEPHONE (OUTPATIENT)
Dept: DIABETES SERVICES | Facility: HOSPITAL | Age: 59
End: 2024-02-02

## 2024-02-02 NOTE — TELEPHONE ENCOUNTER
RICH URIARTE (Wu: [id number]) - [phone]  Sent to [employer]  [facility]  The plan will fax you a determination, typically within 1 to 5 business days.    How do I follow up?  Drug  Bydureon BCise 2MG/0.85ML auto-injectors

## 2024-02-05 ENCOUNTER — TELEPHONE (OUTPATIENT)
Dept: DIABETES SERVICES | Facility: HOSPITAL | Age: 59
End: 2024-02-05

## 2024-02-07 DIAGNOSIS — I10 ESSENTIAL HYPERTENSION: ICD-10-CM

## 2024-02-07 RX ORDER — METOPROLOL TARTRATE 50 MG/1
50 TABLET, FILM COATED ORAL 2 TIMES DAILY
Qty: 180 TABLET | Refills: 1 | OUTPATIENT
Start: 2024-02-07

## 2024-02-12 ENCOUNTER — PRIOR AUTHORIZATION (OUTPATIENT)
Dept: FAMILY MEDICINE CLINIC | Age: 59
End: 2024-02-12

## 2024-02-15 NOTE — PLAN OF CARE
Gómez is a 58-year-old gentleman with past medical history of COPD with bronchiectasis on home oxygen, CKD with baseline creatinine between 1.4-1.7, DVT, struct of sleep apnea, type 2 diabetes, and morbid obesity with BMI 41 who initially presented to [facility] with concern for pneumonia.  Patient was recently admitted to [facility] with tibial plateau fracture.  Chest x-ray showed pneumonia and he had a leukocytosis.  In the process of managing his pneumonia, was noted that he had a area on his fifth metatarsal that was concerning for osteomyelitis and MRI actually showed septic arthritis.  Dr. Nelson was consulted and will see the patient for septic arthritis.  Patient is currently on cefepime and vancomycin.  CBC shows a white count of 20,000 and his creatinine is 1.4 which is close to his baseline.  Will continue antibiotics.    Was contacted by the transfer center that the patient had had a significant status change and was currently in their ICU on BiPAP.  I discussed with transfer center and bedboard and felt that the safest plan for the patient was to cancel the transfer at this time.  As result, they will have to call back and discuss the patient tomorrow if he is safe enough for transport.

## 2024-02-16 ENCOUNTER — HOSPITAL ENCOUNTER (INPATIENT)
Facility: HOSPITAL | Age: 59
LOS: 9 days | Discharge: NURSING, REHAB OR LONG-TERM CARE | DRG: 871 | End: 2024-02-25
Attending: INTERNAL MEDICINE | Admitting: INTERNAL MEDICINE
Payer: MEDICAID

## 2024-02-16 ENCOUNTER — APPOINTMENT (OUTPATIENT)
Dept: GENERAL RADIOLOGY | Facility: HOSPITAL | Age: 59
DRG: 871 | End: 2024-02-16
Payer: MEDICAID

## 2024-02-16 DIAGNOSIS — R26.2 DIFFICULTY WALKING: ICD-10-CM

## 2024-02-16 DIAGNOSIS — Z78.9 DECREASED ACTIVITIES OF DAILY LIVING (ADL): Primary | ICD-10-CM

## 2024-02-16 DIAGNOSIS — G40.909 SEIZURE DISORDER: ICD-10-CM

## 2024-02-16 PROBLEM — L97.421 SKIN ULCER OF LEFT HEEL, LIMITED TO BREAKDOWN OF SKIN: Status: ACTIVE | Noted: 2024-02-16

## 2024-02-16 PROBLEM — M79.672 LEFT FOOT PAIN: Status: ACTIVE | Noted: 2024-02-16

## 2024-02-16 PROBLEM — L97.521 ULCER OF LEFT FOOT, LIMITED TO BREAKDOWN OF SKIN: Status: ACTIVE | Noted: 2024-02-16

## 2024-02-16 PROBLEM — M00.9 SEPTIC JOINT: Status: ACTIVE | Noted: 2024-02-16

## 2024-02-16 PROBLEM — M00.9 SEPTIC ARTHRITIS: Status: ACTIVE | Noted: 2024-02-16

## 2024-02-16 LAB
ALBUMIN SERPL-MCNC: 2.8 G/DL (ref 3.5–5.2)
ALBUMIN/GLOB SERPL: 0.5 G/DL
ALP SERPL-CCNC: 214 U/L (ref 39–117)
ALT SERPL W P-5'-P-CCNC: 13 U/L (ref 1–41)
ANION GAP SERPL CALCULATED.3IONS-SCNC: 8.2 MMOL/L (ref 5–15)
ARTERIAL PATENCY WRIST A: POSITIVE
AST SERPL-CCNC: 14 U/L (ref 1–40)
B PARAPERT DNA SPEC QL NAA+PROBE: NOT DETECTED
B PERT DNA SPEC QL NAA+PROBE: NOT DETECTED
BASE EXCESS BLDA CALC-SCNC: -1.2 MMOL/L (ref -2–2)
BASOPHILS # BLD AUTO: 0.09 10*3/MM3 (ref 0–0.2)
BASOPHILS NFR BLD AUTO: 0.6 % (ref 0–1.5)
BDY SITE: ABNORMAL
BILIRUB SERPL-MCNC: 0.2 MG/DL (ref 0–1.2)
BUN SERPL-MCNC: 29 MG/DL (ref 6–20)
BUN/CREAT SERPL: 16.3 (ref 7–25)
C PNEUM DNA NPH QL NAA+NON-PROBE: NOT DETECTED
CA-I BLDA-SCNC: 1.2 MMOL/L (ref 1.13–1.32)
CALCIUM SPEC-SCNC: 8.8 MG/DL (ref 8.6–10.5)
CHLORIDE BLDA-SCNC: 105 MMOL/L (ref 98–106)
CHLORIDE SERPL-SCNC: 102 MMOL/L (ref 98–107)
CO2 SERPL-SCNC: 27.8 MMOL/L (ref 22–29)
COHGB MFR BLD: 0.1 % (ref 0–1.5)
CREAT SERPL-MCNC: 1.78 MG/DL (ref 0.76–1.27)
CRP SERPL-MCNC: 6.82 MG/DL (ref 0–0.5)
D-LACTATE SERPL-SCNC: 0.5 MMOL/L (ref 0.5–2)
DEPRECATED RDW RBC AUTO: 48 FL (ref 37–54)
EGFRCR SERPLBLD CKD-EPI 2021: 43.7 ML/MIN/1.73
EOSINOPHIL # BLD AUTO: 0.11 10*3/MM3 (ref 0–0.4)
EOSINOPHIL NFR BLD AUTO: 0.7 % (ref 0.3–6.2)
ERYTHROCYTE [DISTWIDTH] IN BLOOD BY AUTOMATED COUNT: 14.5 % (ref 12.3–15.4)
ERYTHROCYTE [SEDIMENTATION RATE] IN BLOOD: 46 MM/HR (ref 0–20)
FHHB: 5.7 % (ref 0–5)
FLUAV SUBTYP SPEC NAA+PROBE: NOT DETECTED
FLUBV RNA ISLT QL NAA+PROBE: NOT DETECTED
GAS FLOW AIRWAY: 3 LPM
GLOBULIN UR ELPH-MCNC: 5.5 GM/DL
GLUCOSE BLDA-MCNC: 277 MG/DL (ref 70–99)
GLUCOSE BLDC GLUCOMTR-MCNC: 254 MG/DL (ref 70–99)
GLUCOSE BLDC GLUCOMTR-MCNC: 259 MG/DL (ref 70–99)
GLUCOSE SERPL-MCNC: 281 MG/DL (ref 65–99)
HADV DNA SPEC NAA+PROBE: NOT DETECTED
HCO3 BLDA-SCNC: 26.4 MMOL/L (ref 22–26)
HCOV 229E RNA SPEC QL NAA+PROBE: NOT DETECTED
HCOV HKU1 RNA SPEC QL NAA+PROBE: NOT DETECTED
HCOV NL63 RNA SPEC QL NAA+PROBE: NOT DETECTED
HCOV OC43 RNA SPEC QL NAA+PROBE: NOT DETECTED
HCT VFR BLD AUTO: 28.1 % (ref 37.5–51)
HGB BLD-MCNC: 8.3 G/DL (ref 13–17.7)
HGB BLDA-MCNC: 9.8 G/DL (ref 13.8–16.4)
HMPV RNA NPH QL NAA+NON-PROBE: NOT DETECTED
HPIV1 RNA ISLT QL NAA+PROBE: NOT DETECTED
HPIV2 RNA SPEC QL NAA+PROBE: NOT DETECTED
HPIV3 RNA NPH QL NAA+PROBE: NOT DETECTED
HPIV4 P GENE NPH QL NAA+PROBE: NOT DETECTED
IMM GRANULOCYTES # BLD AUTO: 0.34 10*3/MM3 (ref 0–0.05)
IMM GRANULOCYTES NFR BLD AUTO: 2.3 % (ref 0–0.5)
INHALED O2 CONCENTRATION: 32 %
INR PPP: 1.21 (ref 0.86–1.15)
LACTATE BLDA-SCNC: 1 MMOL/L (ref 0.5–2)
LYMPHOCYTES # BLD AUTO: 0.99 10*3/MM3 (ref 0.7–3.1)
LYMPHOCYTES NFR BLD AUTO: 6.7 % (ref 19.6–45.3)
M PNEUMO IGG SER IA-ACNC: NOT DETECTED
MAGNESIUM SERPL-MCNC: 2.1 MG/DL (ref 1.6–2.6)
MCH RBC QN AUTO: 26.9 PG (ref 26.6–33)
MCHC RBC AUTO-ENTMCNC: 29.5 G/DL (ref 31.5–35.7)
MCV RBC AUTO: 91.2 FL (ref 79–97)
METHGB BLD QL: 0.1 % (ref 0–1.5)
MODALITY: ABNORMAL
MONOCYTES # BLD AUTO: 0.33 10*3/MM3 (ref 0.1–0.9)
MONOCYTES NFR BLD AUTO: 2.2 % (ref 5–12)
NEUTROPHILS NFR BLD AUTO: 12.98 10*3/MM3 (ref 1.7–7)
NEUTROPHILS NFR BLD AUTO: 87.5 % (ref 42.7–76)
NOTE: ABNORMAL
NRBC BLD AUTO-RTO: 0 /100 WBC (ref 0–0.2)
NT-PROBNP SERPL-MCNC: 2580 PG/ML (ref 0–900)
OXYHGB MFR BLDV: 94.1 % (ref 94–99)
PCO2 BLDA: 60.1 MM HG (ref 35–45)
PH BLDA: 7.26 PH UNITS (ref 7.35–7.45)
PHOSPHATE SERPL-MCNC: 2.9 MG/DL (ref 2.5–4.5)
PLATELET # BLD AUTO: 491 10*3/MM3 (ref 140–450)
PMV BLD AUTO: 8.9 FL (ref 6–12)
PO2 BLD: 245 MM[HG] (ref 0–500)
PO2 BLDA: 78.5 MM HG (ref 80–100)
POTASSIUM BLDA-SCNC: 5.13 MMOL/L (ref 3.5–5)
POTASSIUM SERPL-SCNC: 5.2 MMOL/L (ref 3.5–5.2)
PROCALCITONIN SERPL-MCNC: 0.78 NG/ML (ref 0–0.25)
PROT SERPL-MCNC: 8.3 G/DL (ref 6–8.5)
PROTHROMBIN TIME: 15.5 SECONDS (ref 11.8–14.9)
RBC # BLD AUTO: 3.08 10*6/MM3 (ref 4.14–5.8)
RHINOVIRUS RNA SPEC NAA+PROBE: NOT DETECTED
RSV RNA NPH QL NAA+NON-PROBE: NOT DETECTED
SAO2 % BLDCOA: 94.3 % (ref 95–99)
SARS-COV-2 RNA RESP QL NAA+PROBE: NOT DETECTED
SODIUM BLDA-SCNC: 135.8 MMOL/L (ref 136–146)
SODIUM SERPL-SCNC: 138 MMOL/L (ref 136–145)
WBC NRBC COR # BLD AUTO: 14.84 10*3/MM3 (ref 3.4–10.8)

## 2024-02-16 PROCEDURE — 83605 ASSAY OF LACTIC ACID: CPT | Performed by: INTERNAL MEDICINE

## 2024-02-16 PROCEDURE — G8404 LOW EXTEMITY NEUR EXAM DOCUM: HCPCS | Performed by: PODIATRIST

## 2024-02-16 PROCEDURE — 87040 BLOOD CULTURE FOR BACTERIA: CPT | Performed by: INTERNAL MEDICINE

## 2024-02-16 PROCEDURE — 86140 C-REACTIVE PROTEIN: CPT | Performed by: INTERNAL MEDICINE

## 2024-02-16 PROCEDURE — 85025 COMPLETE CBC W/AUTO DIFF WBC: CPT | Performed by: INTERNAL MEDICINE

## 2024-02-16 PROCEDURE — 63710000001 INSULIN DETEMIR PER 5 UNITS: Performed by: INTERNAL MEDICINE

## 2024-02-16 PROCEDURE — 25010000002 CEFEPIME PER 500 MG: Performed by: INTERNAL MEDICINE

## 2024-02-16 PROCEDURE — 99291 CRITICAL CARE FIRST HOUR: CPT | Performed by: INTERNAL MEDICINE

## 2024-02-16 PROCEDURE — 85652 RBC SED RATE AUTOMATED: CPT | Performed by: INTERNAL MEDICINE

## 2024-02-16 PROCEDURE — 82948 REAGENT STRIP/BLOOD GLUCOSE: CPT | Performed by: INTERNAL MEDICINE

## 2024-02-16 PROCEDURE — 84100 ASSAY OF PHOSPHORUS: CPT | Performed by: INTERNAL MEDICINE

## 2024-02-16 PROCEDURE — 85610 PROTHROMBIN TIME: CPT | Performed by: INTERNAL MEDICINE

## 2024-02-16 PROCEDURE — 94799 UNLISTED PULMONARY SVC/PX: CPT

## 2024-02-16 PROCEDURE — 71045 X-RAY EXAM CHEST 1 VIEW: CPT

## 2024-02-16 PROCEDURE — 82375 ASSAY CARBOXYHB QUANT: CPT

## 2024-02-16 PROCEDURE — 94761 N-INVAS EAR/PLS OXIMETRY MLT: CPT

## 2024-02-16 PROCEDURE — 83880 ASSAY OF NATRIURETIC PEPTIDE: CPT | Performed by: INTERNAL MEDICINE

## 2024-02-16 PROCEDURE — 25010000002 FUROSEMIDE PER 20 MG

## 2024-02-16 PROCEDURE — 83050 HGB METHEMOGLOBIN QUAN: CPT

## 2024-02-16 PROCEDURE — 36600 WITHDRAWAL OF ARTERIAL BLOOD: CPT

## 2024-02-16 PROCEDURE — 82805 BLOOD GASES W/O2 SATURATION: CPT

## 2024-02-16 PROCEDURE — 99223 1ST HOSP IP/OBS HIGH 75: CPT | Performed by: INTERNAL MEDICINE

## 2024-02-16 PROCEDURE — 83735 ASSAY OF MAGNESIUM: CPT | Performed by: INTERNAL MEDICINE

## 2024-02-16 PROCEDURE — 25010000002 HYDRALAZINE PER 20 MG: Performed by: FAMILY MEDICINE

## 2024-02-16 PROCEDURE — 63710000001 INSULIN LISPRO (HUMAN) PER 5 UNITS: Performed by: INTERNAL MEDICINE

## 2024-02-16 PROCEDURE — 99254 IP/OBS CNSLTJ NEW/EST MOD 60: CPT | Performed by: PODIATRIST

## 2024-02-16 PROCEDURE — 0202U NFCT DS 22 TRGT SARS-COV-2: CPT

## 2024-02-16 PROCEDURE — 84145 PROCALCITONIN (PCT): CPT | Performed by: INTERNAL MEDICINE

## 2024-02-16 PROCEDURE — 80053 COMPREHEN METABOLIC PANEL: CPT | Performed by: INTERNAL MEDICINE

## 2024-02-16 PROCEDURE — 94660 CPAP INITIATION&MGMT: CPT

## 2024-02-16 RX ORDER — NIFEDIPINE 30 MG/1
30 TABLET, EXTENDED RELEASE ORAL EVERY MORNING
Status: DISCONTINUED | OUTPATIENT
Start: 2024-02-17 | End: 2024-02-19

## 2024-02-16 RX ORDER — DULOXETIN HYDROCHLORIDE 30 MG/1
30 CAPSULE, DELAYED RELEASE ORAL 2 TIMES DAILY
Status: DISCONTINUED | OUTPATIENT
Start: 2024-02-17 | End: 2024-02-17

## 2024-02-16 RX ORDER — MONTELUKAST SODIUM 10 MG/1
10 TABLET ORAL NIGHTLY
Status: DISCONTINUED | OUTPATIENT
Start: 2024-02-16 | End: 2024-02-25 | Stop reason: HOSPADM

## 2024-02-16 RX ORDER — HEPARIN SODIUM 5000 [USP'U]/ML
5000 INJECTION, SOLUTION INTRAVENOUS; SUBCUTANEOUS EVERY 8 HOURS SCHEDULED
Status: DISCONTINUED | OUTPATIENT
Start: 2024-02-16 | End: 2024-02-16

## 2024-02-16 RX ORDER — NICOTINE POLACRILEX 4 MG
15 LOZENGE BUCCAL
Status: DISCONTINUED | OUTPATIENT
Start: 2024-02-16 | End: 2024-02-25 | Stop reason: HOSPADM

## 2024-02-16 RX ORDER — BUDESONIDE 0.5 MG/2ML
0.5 INHALANT ORAL
Status: DISCONTINUED | OUTPATIENT
Start: 2024-02-16 | End: 2024-02-25 | Stop reason: HOSPADM

## 2024-02-16 RX ORDER — BUMETANIDE 1 MG/1
2 TABLET ORAL DAILY
Status: DISCONTINUED | OUTPATIENT
Start: 2024-02-17 | End: 2024-02-17

## 2024-02-16 RX ORDER — BENZONATATE 100 MG/1
100 CAPSULE ORAL 3 TIMES DAILY PRN
Status: DISCONTINUED | OUTPATIENT
Start: 2024-02-16 | End: 2024-02-25 | Stop reason: HOSPADM

## 2024-02-16 RX ORDER — INSULIN LISPRO 100 [IU]/ML
4-24 INJECTION, SOLUTION INTRAVENOUS; SUBCUTANEOUS
Status: DISCONTINUED | OUTPATIENT
Start: 2024-02-16 | End: 2024-02-25 | Stop reason: HOSPADM

## 2024-02-16 RX ORDER — GUAIFENESIN/DEXTROMETHORPHAN 100-10MG/5
5 SYRUP ORAL EVERY 4 HOURS PRN
Status: DISCONTINUED | OUTPATIENT
Start: 2024-02-16 | End: 2024-02-25 | Stop reason: HOSPADM

## 2024-02-16 RX ORDER — ALBUTEROL SULFATE 2.5 MG/3ML
2.5 SOLUTION RESPIRATORY (INHALATION) EVERY 4 HOURS PRN
Status: DISCONTINUED | OUTPATIENT
Start: 2024-02-16 | End: 2024-02-25 | Stop reason: HOSPADM

## 2024-02-16 RX ORDER — NICOTINE 21 MG/24HR
1 PATCH, TRANSDERMAL 24 HOURS TRANSDERMAL DAILY PRN
Status: DISCONTINUED | OUTPATIENT
Start: 2024-02-16 | End: 2024-02-25 | Stop reason: HOSPADM

## 2024-02-16 RX ORDER — GABAPENTIN 100 MG/1
100 CAPSULE ORAL 3 TIMES DAILY PRN
Status: DISCONTINUED | OUTPATIENT
Start: 2024-02-16 | End: 2024-02-21

## 2024-02-16 RX ORDER — PROCHLORPERAZINE EDISYLATE 5 MG/ML
5 INJECTION INTRAMUSCULAR; INTRAVENOUS EVERY 6 HOURS PRN
Status: DISCONTINUED | OUTPATIENT
Start: 2024-02-16 | End: 2024-02-25 | Stop reason: HOSPADM

## 2024-02-16 RX ORDER — POLYETHYLENE GLYCOL 3350 17 G/17G
17 POWDER, FOR SOLUTION ORAL DAILY PRN
Status: DISCONTINUED | OUTPATIENT
Start: 2024-02-16 | End: 2024-02-25 | Stop reason: HOSPADM

## 2024-02-16 RX ORDER — SODIUM CHLORIDE 9 MG/ML
40 INJECTION, SOLUTION INTRAVENOUS AS NEEDED
Status: DISCONTINUED | OUTPATIENT
Start: 2024-02-16 | End: 2024-02-25 | Stop reason: HOSPADM

## 2024-02-16 RX ORDER — ONDANSETRON 2 MG/ML
4 INJECTION INTRAMUSCULAR; INTRAVENOUS EVERY 6 HOURS PRN
Status: DISCONTINUED | OUTPATIENT
Start: 2024-02-16 | End: 2024-02-25 | Stop reason: HOSPADM

## 2024-02-16 RX ORDER — BISACODYL 5 MG/1
5 TABLET, DELAYED RELEASE ORAL DAILY PRN
Status: DISCONTINUED | OUTPATIENT
Start: 2024-02-16 | End: 2024-02-25 | Stop reason: HOSPADM

## 2024-02-16 RX ORDER — ARFORMOTEROL TARTRATE 15 UG/2ML
15 SOLUTION RESPIRATORY (INHALATION)
Status: DISCONTINUED | OUTPATIENT
Start: 2024-02-16 | End: 2024-02-25 | Stop reason: HOSPADM

## 2024-02-16 RX ORDER — IPRATROPIUM BROMIDE AND ALBUTEROL SULFATE 2.5; .5 MG/3ML; MG/3ML
3 SOLUTION RESPIRATORY (INHALATION)
Status: DISCONTINUED | OUTPATIENT
Start: 2024-02-16 | End: 2024-02-22

## 2024-02-16 RX ORDER — HYDRALAZINE HYDROCHLORIDE 20 MG/ML
10 INJECTION INTRAMUSCULAR; INTRAVENOUS EVERY 6 HOURS PRN
Status: DISCONTINUED | OUTPATIENT
Start: 2024-02-16 | End: 2024-02-25 | Stop reason: HOSPADM

## 2024-02-16 RX ORDER — BISACODYL 10 MG
10 SUPPOSITORY, RECTAL RECTAL DAILY PRN
Status: DISCONTINUED | OUTPATIENT
Start: 2024-02-16 | End: 2024-02-25 | Stop reason: HOSPADM

## 2024-02-16 RX ORDER — FUROSEMIDE 10 MG/ML
40 INJECTION INTRAMUSCULAR; INTRAVENOUS ONCE
Status: COMPLETED | OUTPATIENT
Start: 2024-02-16 | End: 2024-02-16

## 2024-02-16 RX ORDER — HYDROXYZINE HYDROCHLORIDE 25 MG/1
25 TABLET, FILM COATED ORAL 3 TIMES DAILY PRN
Status: DISCONTINUED | OUTPATIENT
Start: 2024-02-16 | End: 2024-02-21

## 2024-02-16 RX ORDER — SODIUM CHLORIDE 0.9 % (FLUSH) 0.9 %
10 SYRINGE (ML) INJECTION EVERY 12 HOURS SCHEDULED
Status: DISCONTINUED | OUTPATIENT
Start: 2024-02-16 | End: 2024-02-25 | Stop reason: HOSPADM

## 2024-02-16 RX ORDER — IBUPROFEN 600 MG/1
1 TABLET ORAL
Status: DISCONTINUED | OUTPATIENT
Start: 2024-02-16 | End: 2024-02-25 | Stop reason: HOSPADM

## 2024-02-16 RX ORDER — LOSARTAN POTASSIUM 25 MG/1
25 TABLET ORAL
Status: DISCONTINUED | OUTPATIENT
Start: 2024-02-17 | End: 2024-02-23

## 2024-02-16 RX ORDER — POLYETHYLENE GLYCOL 3350 17 G/17G
17 POWDER, FOR SOLUTION ORAL 2 TIMES DAILY PRN
Status: DISCONTINUED | OUTPATIENT
Start: 2024-02-16 | End: 2024-02-16

## 2024-02-16 RX ORDER — ONDANSETRON 2 MG/ML
4 INJECTION INTRAMUSCULAR; INTRAVENOUS EVERY 4 HOURS PRN
Status: DISCONTINUED | OUTPATIENT
Start: 2024-02-16 | End: 2024-02-16

## 2024-02-16 RX ORDER — ALUMINA, MAGNESIA, AND SIMETHICONE 2400; 2400; 240 MG/30ML; MG/30ML; MG/30ML
15 SUSPENSION ORAL EVERY 6 HOURS PRN
Status: DISCONTINUED | OUTPATIENT
Start: 2024-02-16 | End: 2024-02-25 | Stop reason: HOSPADM

## 2024-02-16 RX ORDER — DEXTROSE MONOHYDRATE 25 G/50ML
25 INJECTION, SOLUTION INTRAVENOUS
Status: DISCONTINUED | OUTPATIENT
Start: 2024-02-16 | End: 2024-02-25 | Stop reason: HOSPADM

## 2024-02-16 RX ORDER — FERROUS SULFATE 325(65) MG
325 TABLET ORAL
Status: DISCONTINUED | OUTPATIENT
Start: 2024-02-17 | End: 2024-02-23

## 2024-02-16 RX ORDER — CARVEDILOL 25 MG/1
25 TABLET ORAL EVERY 12 HOURS SCHEDULED
Status: DISCONTINUED | OUTPATIENT
Start: 2024-02-16 | End: 2024-02-25 | Stop reason: HOSPADM

## 2024-02-16 RX ORDER — ACETAMINOPHEN 325 MG/1
650 TABLET ORAL EVERY 6 HOURS PRN
Status: DISCONTINUED | OUTPATIENT
Start: 2024-02-16 | End: 2024-02-16

## 2024-02-16 RX ORDER — CHOLECALCIFEROL (VITAMIN D3) 125 MCG
5 CAPSULE ORAL NIGHTLY PRN
Status: DISCONTINUED | OUTPATIENT
Start: 2024-02-16 | End: 2024-02-25 | Stop reason: HOSPADM

## 2024-02-16 RX ORDER — ECHINACEA PURPUREA EXTRACT 125 MG
2 TABLET ORAL AS NEEDED
Status: DISCONTINUED | OUTPATIENT
Start: 2024-02-16 | End: 2024-02-25 | Stop reason: HOSPADM

## 2024-02-16 RX ORDER — LIDOCAINE 4 G/G
1 PATCH TOPICAL DAILY PRN
Status: DISCONTINUED | OUTPATIENT
Start: 2024-02-16 | End: 2024-02-25 | Stop reason: HOSPADM

## 2024-02-16 RX ORDER — FAMOTIDINE 20 MG/1
40 TABLET, FILM COATED ORAL EVERY MORNING
Status: DISCONTINUED | OUTPATIENT
Start: 2024-02-17 | End: 2024-02-18

## 2024-02-16 RX ORDER — AMOXICILLIN 250 MG
2 CAPSULE ORAL 2 TIMES DAILY
Status: DISCONTINUED | OUTPATIENT
Start: 2024-02-16 | End: 2024-02-25 | Stop reason: HOSPADM

## 2024-02-16 RX ORDER — SACCHAROMYCES BOULARDII 250 MG
250 CAPSULE ORAL 2 TIMES DAILY
Status: DISCONTINUED | OUTPATIENT
Start: 2024-02-16 | End: 2024-02-22

## 2024-02-16 RX ORDER — ACETAMINOPHEN 325 MG/1
650 TABLET ORAL EVERY 4 HOURS PRN
Status: DISCONTINUED | OUTPATIENT
Start: 2024-02-16 | End: 2024-02-25 | Stop reason: HOSPADM

## 2024-02-16 RX ORDER — SODIUM CHLORIDE 0.9 % (FLUSH) 0.9 %
10 SYRINGE (ML) INJECTION AS NEEDED
Status: DISCONTINUED | OUTPATIENT
Start: 2024-02-16 | End: 2024-02-25 | Stop reason: HOSPADM

## 2024-02-16 RX ORDER — FOLIC ACID 1 MG/1
1 TABLET ORAL DAILY
Status: DISCONTINUED | OUTPATIENT
Start: 2024-02-16 | End: 2024-02-25 | Stop reason: HOSPADM

## 2024-02-16 RX ORDER — FINASTERIDE 5 MG/1
5 TABLET, FILM COATED ORAL DAILY
Status: DISCONTINUED | OUTPATIENT
Start: 2024-02-16 | End: 2024-02-19

## 2024-02-16 RX ORDER — GUAIFENESIN 600 MG/1
600 TABLET, EXTENDED RELEASE ORAL EVERY 12 HOURS SCHEDULED
Status: DISCONTINUED | OUTPATIENT
Start: 2024-02-16 | End: 2024-02-22

## 2024-02-16 RX ADMIN — INSULIN LISPRO 12 UNITS: 100 INJECTION, SOLUTION INTRAVENOUS; SUBCUTANEOUS at 23:10

## 2024-02-16 RX ADMIN — IPRATROPIUM BROMIDE AND ALBUTEROL SULFATE 3 ML: .5; 3 SOLUTION RESPIRATORY (INHALATION) at 19:35

## 2024-02-16 RX ADMIN — APIXABAN 5 MG: 5 TABLET, FILM COATED ORAL at 23:10

## 2024-02-16 RX ADMIN — DOCUSATE SODIUM 50MG AND SENNOSIDES 8.6MG 2 TABLET: 8.6; 5 TABLET, FILM COATED ORAL at 23:11

## 2024-02-16 RX ADMIN — FUROSEMIDE 40 MG: 10 INJECTION, SOLUTION INTRAMUSCULAR; INTRAVENOUS at 20:18

## 2024-02-16 RX ADMIN — CEFEPIME 2000 MG: 2 INJECTION, POWDER, FOR SOLUTION INTRAVENOUS at 20:18

## 2024-02-16 RX ADMIN — FOLIC ACID 1 MG: 1 TABLET ORAL at 23:12

## 2024-02-16 RX ADMIN — Medication 10 ML: at 23:20

## 2024-02-16 RX ADMIN — MONTELUKAST 10 MG: 10 TABLET, FILM COATED ORAL at 23:12

## 2024-02-16 RX ADMIN — CARVEDILOL 25 MG: 25 TABLET, FILM COATED ORAL at 23:11

## 2024-02-16 RX ADMIN — ARFORMOTEROL TARTRATE 15 MCG: 15 SOLUTION RESPIRATORY (INHALATION) at 19:35

## 2024-02-16 RX ADMIN — HYDRALAZINE HYDROCHLORIDE 10 MG: 20 INJECTION, SOLUTION INTRAMUSCULAR; INTRAVENOUS at 23:19

## 2024-02-16 RX ADMIN — FINASTERIDE 5 MG: 5 TABLET, FILM COATED ORAL at 23:12

## 2024-02-16 RX ADMIN — BUDESONIDE 0.5 MG: 0.5 INHALANT ORAL at 19:35

## 2024-02-16 RX ADMIN — GUAIFENESIN 600 MG: 600 TABLET ORAL at 23:12

## 2024-02-16 RX ADMIN — INSULIN DETEMIR 20 UNITS: 100 INJECTION, SOLUTION SUBCUTANEOUS at 23:09

## 2024-02-16 RX ADMIN — INSULIN LISPRO 12 UNITS: 100 INJECTION, SOLUTION INTRAVENOUS; SUBCUTANEOUS at 17:20

## 2024-02-16 RX ADMIN — Medication 250 MG: at 23:12

## 2024-02-16 NOTE — CONSULTS
[facility]   HISTORY AND PHYSICAL    Patient Name: Preston Wallis  : 1965  MRN: [id number]  Primary Care Physician:  Kimmy Riley MD  Date of admission: 2024    Subjective   Subjective     Chief Complaint: Left foot ulcerations    HPI:    Preston Wallis is a 58 y.o. male     PMH:  COPD with bronchiectasis on 2 L home oxygen, CKD with baseline creatinine between 1.4-1.7, DVT, obstructive sleep apnea, noncompliant with BiPAP, type 2 diabetes, nursing home resident and morbid obesity with BMI 41 who initially presented to [facility] on  with concern for pneumonia.  Patient was recently admitted to [place] with tibial plateau fracture.  Chest x-ray showed pneumonia and he had a leukocytosis.  He was started on broad-spectrum vancomycin and cefepime and admitted for further care.  In the process of managing his pneumonia, was noted that he had a area on his fifth metatarsal that was concerning for osteomyelitis and MRI actually showed septic arthritis.  Podiatry was consulted and will see the patient for septic arthritis.  Patient is currently on cefepime and vancomycin.  CBC shows a white count of 20,000 and his creatinine is 2.0.  He was planned to be transferred to [facility] on 2/15 but he had an acute status change with hypercapnia and pH 7.23, CO2 71.  He was placed on BiPAP with improvement of his mental status.  He again refused to wear BiPAP overnight however he is back to his baseline mentation this morning.  ABG this morning showed pH 7.26, CO2 66.  However, he has on PCU, currently on 3 L nasal cannula, blood pressure 179/80, normal heart rate.  He is transferred to [facility] on .  Recommend consultation to Dr. Nelson and pulmonology upon arrival.  Plan to continue vancomycin and cefepime.     Of note, on  the patient had 2/2 blood cultures positive for MRSA.  Repeat blood cultures were negative, it was presumed to be from cellulitis of his foot and he was treated with Zyvox.  Repeat  blood cultures this admission have been negative for MRSA.  He has never had a KARINA as his blood cultures cleared quickly.         Patient states he has numbness throughout his feet.  He states he is unsure how long the ulcerations about on his left foot.  He states he is feeling better upon admission to [facility].    Review of Systems   All systems were reviewed and negative except for: Left foot ulcerations    Personal History     Past Medical History:   Diagnosis Date    Age-related cognitive decline     Allergic contact dermatitis     Allergies     Anemia     Bronchiectasis with acute lower respiratory infection     Charcot foot due to diabetes mellitus 9/10/2013    Chronic diastolic (congestive) heart failure     Chronic kidney disease     Chronic respiratory failure with hypoxia     Closed supracondylar fracture of femur 1/12/2022    COPD (chronic obstructive pulmonary disease)     Deep vein thrombosis (DVT) of lower extremity associated with air travel 1/13/2023    Dependence on supplemental oxygen     Eczema     Erectile dysfunction     due to organic reasons    Essential (primary) hypertension     Fracture     closed fracture of other tarsal and metatarsal bones    Fracture of proximal humerus 1/13/2023    GERD without esophagitis     High risk medication use     Hypercholesteremia     Hypomagnesemia     Infected stasis ulcer of left lower extremity 1/13/2023    Insomnia     Low back pain     Major depressive disorder     Morbid (severe) obesity due to excess calories     MRSA pneumonia     Muscle weakness     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 without evidence of necrosis     Obstructive sleep apnea (adult) (pediatric)     Other forms of dyspnea     Other long term (current) drug therapy     Other specified noninfective gastroenteritis and colitis     Other spondylosis, lumbar region     Pain in both knees     Paroxysmal atrial fibrillation      Peripheral neuropathy     attributed to type 2 diabetes    Pneumonia, unspecified organism     Polyneuropathy     Rash and other nonspecific skin eruption     Syncope and collapse     Tachycardia     Tinnitus 1/13/2023    Type 1 diabetes mellitus with diabetic chronic kidney disease     Type 2 diabetes mellitus     Unspecified fall, initial encounter     Urinary retention        Past Surgical History:   Procedure Laterality Date    CHOLECYSTECTOMY      CYSTOSCOPY      FEMUR SURGERY Left     Shravan placed    KNEE SURGERY Left     OTHER SURGICAL HISTORY Left     venous port    TIBIAL PLATEAU OPEN REDUCTION INTERNAL FIXATION Left 12/22/2023    Procedure: TIBIAL PLATEAU OPEN REDUCTION INTERNAL FIXATION;  Surgeon: Hugo Kline MD;  Location: [facility];  Service: Orthopedics;  Laterality: Left;    TONSILLECTOMY AND ADENOIDECTOMY         Family History: family history includes Asthma in his father; Cancer in his sister; Coronary artery disease in his mother; Diabetes type II in his mother and sister; Hypertension in his mother. Otherwise pertinent FHx was reviewed and not pertinent to current issue.    Social History:  reports that he quit smoking about 31 years ago. His smoking use included cigarettes. He started smoking about 43 years ago. He has a 12.00 pack-year smoking history. He has never used smokeless tobacco. He reports that he does not currently use alcohol. He reports that he does not use drugs.    Home Medications:  Budeson-Glycopyrrol-Formoterol, DULoxetine, Ertugliflozin L-PyroglutamicAc, Fluticasone-Salmeterol, FreeStyle Bethany 2 Kittery, FreeStyle Bethany 2 Sensor, Insulin Glargine, Insulin Lispro (1 Unit Dial), Insulin Pen Needle, NIFEdipine XL, O2, Semaglutide (1 MG/DOSE), TRUEplus Lancets 28G, Vitamin D3, albuterol sulfate HFA, apixaban, bumetanide, calcium citrate, carvedilol, dapagliflozin, exenatide er, famotidine, ferrous gluconate, finasteride, folic acid, gabapentin, glucose blood,  losartan, magnesium oxide, montelukast, potassium chloride, and silver sulfadiazine      Allergies:  Allergies   Allergen Reactions    Benadryl [Diphenhydramine] Itching    Proventil [Albuterol] Other (See Comments)     Mouth sores         Objective   Objective     Vitals:   Temp:  [97.3 °F (36.3 °C)] 97.3 °F (36.3 °C)  Heart Rate:  [76] 76  Resp:  [16] 16  BP: (155)/(87) 155/87  Flow (L/min):  [3] 3  Physical Exam      GEN:   A&Ox3, Patient seen at bedside in no apparent distress.    Physical Exam  Constitutional:       General: He is not in acute distress.     Appearance: He is obese. He is ill-appearing and chronically ill-appearing.   Cardiovascular:      Pulses:           Dorsalis pedis pulses are 1+ on the right side and 1+ on the left side.        Posterior tibial pulses are 1+ on the right side and 1+ on the left side.   Feet:      Right foot:      Protective Sensation: 10 sites tested.        Skin integrity: Warmth present.      Left foot:      Protective Sensation: 10 sites tested.        Skin integrity: Ulcer and warmth present.         Foot/Ankle Exam    GENERAL  Appearance:  obese, chronically ill and appears ill  Orientation:  AAOx3  Affect:  appropriate    VASCULAR     Right Foot Vascularity   Dorsalis pedis:  1+  Posterior tibial:  1+  Skin temperature:  warm  Edema grading:  None  CFT:  < 3 seconds  Pedal hair growth:  Absent  Varicosities:  mild varicosities     Left Foot Vascularity   Dorsalis pedis:  1+  Posterior tibial:  1+  Skin temperature:  warm  Edema grading:  None  CFT:  < 3 seconds  Pedal hair growth:  Absent  Varicosities:  none     NEUROLOGIC     Right Foot Neurologic   Light touch sensation: absent  Vibratory sensation: absent  Hot/Cold sensation: absent  Protective Sensation using [place]-Deshaun Monofilament:   Sites tested: 10     Left Foot Neurologic   Light touch sensation: absent  Vibratory sensation: absent  Hot/Cold sensation:  absent  Protective Sensation using  [place]amar-Deshaun Monofilament:   Sites tested: 10    MUSCLE STRENGTH     Right Foot Muscle Strength   Foot dorsiflexion:  3  Foot plantar flexion:  3  Foot inversion:  3  Foot eversion:  3     Left Foot Muscle Strength   Foot dorsiflexion:  3  Foot plantar flexion:  3  Foot inversion:  3  Foot eversion:  3    RANGE OF MOTION     Right Foot Range of Motion   Foot and ankle ROM within normal limits       Left Foot Range of Motion   Foot and ankle ROM within normal limits      DERMATOLOGIC      Right Foot Dermatologic   Skin  Right foot skin is intact.      Left Foot Dermatologic   Skin  Positive for ulcer.      Left foot additional comments: Pressure ulceration is seen on left heel.  No drainage present.    Ulceration plantar left fifth metatarsal head with eschar.  Surrounding skin was loose and debrided manually.  No fluctuance seen.  Will local erythema with no edema.  No lymphangitis.  No probing to bone.              LEFT FOOT SERIES    HISTORY: Left foot pain, left foot wound.    FINDINGS: Three views show no evidence of an acute, displaced fracture  or dislocation of the visualized bony architecture. There is mild  diffuse degenerative change. No soft tissue gas or radiopaque foreign  body. No obvious bone destruction. No soft tissue abnormality is seen.  Exam End: 02/11/24 14:10    Specimen Collected: 02/11/24 15:20 Last Resulted: 02/11/24 15:22   Received From: [facility]  Result Received: 02/16/24 16:45     MRI OF LEFT FOOT     PROCEDURE:   Multiplanar, multisequence MRI of the left foot was performed.     HISTORY:   Left foot pain. Osteomyelitis, foot     COMPARISON:   None.     FINDINGS:   Bones/joint:   There is diffuse bone marrow edema involving the fifth digit middle   phalanx, proximal phalanx, and distal aspect of the fifth metatarsal   involving the head and neck. No abnormal bone marrow signal changes to   suggest osteomyelitis/intrinsic bone infection.     Soft tissues:   There is  soft tissue irregularity/ulceration along the plantar/lateral   aspect of the forefoot adjacent to the fifth MTP joint, consistent with   soft tissue ulceration, which appears to be measuring approximately 1.6   x 2.0 x 2.4 cm. There is adjacent cellulitis. There is no abnormal   discrete fluid collections to suggest abscess.     Trace amount of joint fluid in the first, third, and fourth MTP joint   and associated degenerative changes, without abnormal bone marrow signal   changes or surrounding soft tissue abnormality, likely degenerative   effusion. Small amount of fifth MTP joint surrounding soft tissue   swelling and joint effusion. No abnormal marrow enhancement.     Nerves and vessels:   Unremarkable.     Impression      1. Soft tissue ulceration along the plantar/lateral aspect of the  forefoot adjacent to the fifth MTP joint with adjacent cellulitis. No  abnormal fluid collections to suggest abscess.  2. There is bone marrow edema in the fifth digit proximal phalanx and  distal fifth metatarsal and a trace amount of joint effusion. Findings  are concerning for septic arthritis with reactive marrow edema. No  definite osseous destructive changes/abnormal bone marrow signal changes  to suggest osteomyelitis.  3. Mild multijoint degenerative changes of the remaining MTP joints with  a trace amount of joint effusion, and without abnormal MRI signal  changes or enhancement, likely representing degenerative effusions.          Images personally reviewed, interpreted and dictated by SHANT Velasquez.    Exam End: 02/14/24 12:32    Specimen Collected: 02/14/24 13:06 Last Resulted: 02/14/24 13:21   Received From: [facility]  Result Received: 02/16/24 16:45       Result Review    Result Review:  I have personally reviewed the results from the time of this admission to 2/16/2024 18:22 EST and agree with these findings:  [x]  Laboratory  [x]  Microbiology  []  Radiology  []  EKG/Telemetry   []   Cardiology/Vascular   []  Pathology  []  Old records  []  Other:      WBC   Date Value Ref Range Status   02/16/2024 14.84 (H) 3.40 - 10.80 10*3/mm3 Final     RBC   Date Value Ref Range Status   02/16/2024 3.08 (L) 4.14 - 5.80 10*6/mm3 Final     Hemoglobin   Date Value Ref Range Status   02/16/2024 8.3 (L) 13.0 - 17.7 g/dL Final     Hematocrit   Date Value Ref Range Status   02/16/2024 28.1 (L) 37.5 - 51.0 % Final     MCV   Date Value Ref Range Status   02/16/2024 91.2 79.0 - 97.0 fL Final     MCH   Date Value Ref Range Status   02/16/2024 26.9 26.6 - 33.0 pg Final     MCHC   Date Value Ref Range Status   02/16/2024 29.5 (L) 31.5 - 35.7 g/dL Final     RDW   Date Value Ref Range Status   02/16/2024 14.5 12.3 - 15.4 % Final     RDW-SD   Date Value Ref Range Status   02/16/2024 48.0 37.0 - 54.0 fl Final     MPV   Date Value Ref Range Status   02/16/2024 8.9 6.0 - 12.0 fL Final     Platelets   Date Value Ref Range Status   02/16/2024 491 (H) 140 - 450 10*3/mm3 Final     Neutrophil %   Date Value Ref Range Status   02/16/2024 87.5 (H) 42.7 - 76.0 % Final     Lymphocyte %   Date Value Ref Range Status   02/16/2024 6.7 (L) 19.6 - 45.3 % Final     Monocyte %   Date Value Ref Range Status   02/16/2024 2.2 (L) 5.0 - 12.0 % Final     Eosinophil %   Date Value Ref Range Status   02/16/2024 0.7 0.3 - 6.2 % Final     Basophil %   Date Value Ref Range Status   02/16/2024 0.6 0.0 - 1.5 % Final     Immature Grans %   Date Value Ref Range Status   02/16/2024 2.3 (H) 0.0 - 0.5 % Final     Neutrophils, Absolute   Date Value Ref Range Status   02/16/2024 12.98 (H) 1.70 - 7.00 10*3/mm3 Final     Lymphocytes, Absolute   Date Value Ref Range Status   02/16/2024 0.99 0.70 - 3.10 10*3/mm3 Final     Monocytes, Absolute   Date Value Ref Range Status   02/16/2024 0.33 0.10 - 0.90 10*3/mm3 Final     Eosinophils, Absolute   Date Value Ref Range Status   02/16/2024 0.11 0.00 - 0.40 10*3/mm3 Final     Basophils, Absolute   Date Value Ref Range  Status   02/16/2024 0.09 0.00 - 0.20 10*3/mm3 Final     Immature Grans, Absolute   Date Value Ref Range Status   02/16/2024 0.34 (H) 0.00 - 0.05 10*3/mm3 Final     nRBC   Date Value Ref Range Status   02/16/2024 0.0 0.0 - 0.2 /100 WBC Final        Lab Results   Component Value Date    GLUCOSE 281 (H) 02/16/2024    BUN 29 (H) 02/16/2024    CREATININE 1.78 (H) 02/16/2024    EGFRIFNONA 46 (L) 07/27/2021    BCR 16.3 02/16/2024    K 5.2 02/16/2024    CO2 27.8 02/16/2024    CALCIUM 8.8 02/16/2024    PROTENTOTREF 7.1 08/22/2022    ALBUMIN 2.8 (L) 02/16/2024    LABIL2 0.8 08/22/2022    AST 14 02/16/2024    ALT 13 02/16/2024             Most notable findings include: Left heel and Left 5th metatarsal head ulcerations    Assessment & Plan   Assessment / Plan       Active Hospital Problems:  Active Hospital Problems    Diagnosis     **Septic joint     Septic arthritis     Skin ulcer of left heel, limited to breakdown of skin     Ulcer of left foot, limited to breakdown of skin     Left foot pain     Type 2 DM with CKD stage 3 and hypertension     Polyneuropathy        Plan:     Comprehensive lower extremity examination and evaluation was performed.    Discussed findings and treatment plan including risks, benefits, and treatment options with patient in detail. Patient agreed with treatment plan.    Orders written for wound care nurse consult and dressing changes.    Discussed with the patient's hospitalist, Dr. Shane Abarca.  It is not felt that the patient's leukocytosis is from his foot.  The patient does not need surgical debridement at this time.    Patient be reassessed over the weekend.    Discussed with the patient's nurse.    Thank you for including me in the care of this patient.    DVT prophylaxis:  Medical DVT prophylaxis orders are present.        CODE STATUS:    Code Status (Patient has no pulse and is not breathing): CPR (Attempt to Resuscitate)  Medical Interventions (Patient has pulse or is breathing): Full  Support      Electronically signed by Donald Nelson DPM, 02/16/24, 6:11 PM EST.

## 2024-02-16 NOTE — H&P
[facility]    Date of admission: 2/16/2024  Patient Name: Preston Wallis   1965  Primary Care Physician:  Kimmy Riley MD    Subjective       HPI:   Gómez is a 58-year-old gentleman with past medical history of COPD with bronchiectasis on 2 L home oxygen, CKD with baseline creatinine between 1.4-1.7, DVT, obstructive sleep apnea, noncompliant with BiPAP, type 2 diabetes, nursing home resident and morbid obesity with BMI 41 who initially presented to [facility] on 2/11 with concern for pneumonia.  Patient was recently admitted to [place] with tibial plateau fracture.  Chest x-ray showed pneumonia and he had a leukocytosis.  He was started on broad-spectrum vancomycin and cefepime and admitted for further care.  In the process of managing his pneumonia, was noted that he had a area on his fifth metatarsal that was concerning for osteomyelitis and MRI actually showed septic arthritis.  Dr. Nelson was consulted and will see the patient for septic arthritis.  Patient is currently on cefepime and vancomycin.  CBC shows a white count of 20,000 and his creatinine is 2.0.  He was planned to be transferred to [facility] on 2/15 but he had an acute status change with hypercapnia and pH 7.23, CO2 71.  He was placed on BiPAP with improvement of his mental status.  He again refused to wear BiPAP overnight however he is back to his baseline mentation this morning.  ABG this morning showed pH 7.26, CO2 66.  However, he has on PCU, currently on 3 L nasal cannula, blood pressure 179/80, normal heart rate.  He is transferred to [facility] on 2/16.  Recommend consultation to Dr. Nelson and pulmonology upon arrival.  Plan to continue vancomycin and cefepime.     Of note, on 1/31 the patient had 2/2 blood cultures positive for MRSA.  Repeat blood cultures were negative, it was presumed to be from cellulitis of his foot and he was treated with Zyvox.  Repeat blood cultures this admission have been  negative for MRSA.  He has never had a KARINA as his blood cultures cleared quickly.       Hx obtained primarily from chart review and discussion with initially accepting MD as patient is lethargic on arrival requiring bipap.  He apparently was more awake on 3L NC prior to transfer.  Following being on bipap he is waking up more, denies cp/pressure, still feels soa, notes dry mouth, but otherwise very difficult to get hx from him      [facility] notable for:     Active Ambulatory Problems     Diagnosis Date Noted    Polyneuropathy     Paroxysmal atrial fibrillation     Obstructive sleep apnea     MRSA pneumonia     Low back pain     Chronic diastolic heart failure 02/01/2022    Allergies     COPD exacerbation 08/25/2020    Chronic anticoagulation 04/06/2022    Benign prostatic hyperplasia 06/15/2022    Impaired mobility and endurance 06/15/2022    Stage 3a chronic kidney disease 05/12/2022    Iron deficiency anemia secondary to inadequate dietary iron intake 06/21/2022    Vitamin D deficiency 06/21/2022    Class 3 severe obesity with serious comorbidity in adult 06/21/2022    Lower extremity edema 06/21/2022    Elevated alkaline phosphatase level 06/21/2022    Venous insufficiency (chronic) (peripheral) 07/21/2022    Tobacco abuse, in remission 08/25/2022    History of Pseudomonas pneumonia 08/25/2022    Chronic dyspnea 08/25/2022    Gastroesophageal reflux disease 08/25/2022    Bronchiectasis without complication 08/25/2022    ERIC (acute kidney injury) 01/12/2022    Altered mental status 08/25/2020    Hyperlipidemia 05/12/2022    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    Neurogenic bladder 10/28/2022    Class 1 obesity 04/06/2022    Pneumonia due to Pseudomonas species 01/13/2023    Seizures 08/24/2020    Primary osteoarthritis of left knee 02/07/2023    Other constipation 02/07/2023    Chronic obstructive pulmonary disease 02/07/2023    Type 2 DM with CKD stage 3 and hypertension 02/07/2023    Essential hypertension 02/07/2023     Stage 3b chronic kidney disease 02/07/2023    Annual physical exam 02/07/2023    Long-term use of high-risk medication 02/07/2023    Personal history of PE (pulmonary embolism) 02/09/2023    Encounter for aftercare for healing closed traumatic fracture of left femur 04/07/2023    Chronic pain of left knee 04/07/2023    Primary osteoarthritis of right knee 04/07/2023    Sepsis 12/06/2023    Bronchiectasis 12/07/2023    Bacterial pneumonia 12/07/2023    Anemia 12/07/2023    Closed fracture of left tibial plateau 12/14/2023     Resolved Ambulatory Problems     Diagnosis Date Noted    COPD with exacerbation     Chronic cough 10/19/2021    Acute hypoxemic respiratory failure due to COVID-19 10/19/2021    Type 1 diabetes mellitus with diabetic chronic kidney disease     Multifocal pneumonia     Polyneuropathy 05/12/2022    Insomnia     Hypertension 01/12/2022    Acute on chronic respiratory failure with hypoxia 04/05/2022    Acute respiratory failure with hypercapnia 04/07/2022    Type 2 diabetes mellitus with hyperglycemia 04/07/2022    Therapeutic drug monitoring 04/07/2022    Cytokine release syndrome, grade 3 04/11/2022    Elevated troponin 04/11/2022    Doyle catheter in place 06/15/2022    Microscopic hematuria 06/15/2022    Wheezing 08/25/2022    Type 2 diabetes mellitus without complication 08/25/2020    Diabetes 09/01/2022    Abrasion 01/13/2023    Anemia 05/12/2022    Ankle fracture 01/13/2023    Asthma 01/13/2023    Charcot foot due to diabetes mellitus 09/10/2013    Chronic kidney disease 01/13/2023    Closed supracondylar fracture of femur 01/12/2022    Congestive heart failure 05/12/2022    Deep vein thrombosis (DVT) of lower extremity associated with air travel 01/13/2023    Diabetic neuropathy 09/10/2013    Edema 01/13/2023    Ex-smoker 01/13/2023    Fall 01/12/2022    Fracture of proximal humerus 01/13/2023    Hypertensive heart disease without congestive heart failure 01/13/2023    Hypokalemia 01/13/2023     Infected stasis ulcer of left lower extremity 01/13/2023    Proteinuria 10/28/2022    Tinnitus 01/13/2023     Past Medical History:   Diagnosis Date    Age-related cognitive decline     Allergic contact dermatitis     Bronchiectasis with acute lower respiratory infection     Chronic diastolic (congestive) heart failure     Chronic respiratory failure with hypoxia     COPD (chronic obstructive pulmonary disease)     Dependence on supplemental oxygen     Eczema     Erectile dysfunction     Essential (primary) hypertension     Fracture     GERD without esophagitis     High risk medication use     Hypercholesteremia     Hypomagnesemia     Major depressive disorder     Morbid (severe) obesity due to excess calories     Muscle weakness     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 without evidence of necrosis     Obstructive sleep apnea (adult) (pediatric)     Other forms of dyspnea     Other long term (current) drug therapy     Other specified noninfective gastroenteritis and colitis     Other spondylosis, lumbar region     Pain in both knees     Peripheral neuropathy     Pneumonia, unspecified organism     Rash and other nonspecific skin eruption     Syncope and collapse     Tachycardia     Type 2 diabetes mellitus     Unspecified fall, initial encounter     Urinary retention        Past Surgical History:   Procedure Laterality Date    CHOLECYSTECTOMY      CYSTOSCOPY      FEMUR SURGERY Left     Shravan placed    KNEE SURGERY Left     OTHER SURGICAL HISTORY Left     venous port    TIBIAL PLATEAU OPEN REDUCTION INTERNAL FIXATION Left 12/22/2023    Procedure: TIBIAL PLATEAU OPEN REDUCTION INTERNAL FIXATION;  Surgeon: Hugo Kline MD;  Location: [facility];  Service: Orthopedics;  Laterality: Left;    TONSILLECTOMY AND ADENOIDECTOMY          Family History   Problem Relation Age of Onset    Coronary artery disease Mother     Hypertension Mother     Diabetes type II  Mother     Asthma Father     Diabetes type II Sister     Cancer Sister        Social History     Socioeconomic History    Marital status:    Tobacco Use    Smoking status: Former     Packs/day: 1.00     Years: 12.00     Additional pack years: 0.00     Total pack years: 12.00     Types: Cigarettes     Start date:      Quit date:      Years since quittin.1    Smokeless tobacco: Never   Vaping Use    Vaping Use: Never used   Substance and Sexual Activity    Alcohol use: Not Currently    Drug use: Never    Sexual activity: Defer         Objective     Vitals:   Temp:  [97.3 °F (36.3 °C)] 97.3 °F (36.3 °C)  Heart Rate:  [76] 76  Resp:  [16] 16  BP: (155)/(87) 155/87  Flow (L/min):  [3] 3    Physical Exam   Lethargic tired in bed morbidly obese appears older than stated age  Bowel rhonchi and expiratory wheezing dyspneic requires frequent prompting  Irregular regular normal rate no lower extremity pitting edema  Obese abdomen soft no apparent tenderness positive bowel sounds  Left foot left lateral eschar currently undergoing local debridement by podiatry    Result Review    Vital signs, labs and any relevant imaging reviewed.     Assessment / Plan     Sepsis secondary to Pneumonia, bacterial  Acute hypoxemic hypercapnic respiratory failure  Acute metabolic encephalopathy in setting of above  Diabetic foot Infection with initial concerns of septic arthritis  CKD3/4 baseline creatinine ~1.4-1.7  Paroxysmal Afib on apixaban  COPD with exacerbation, baseline 2 L oxygen  Hx of DVT  MILADY noncompliant with home bipap  IDDM2  CAD  BPH  Chronic NH resident  Morbid Obesity    lactic, CBC CMP ordered, Pro-Cristi elevated, white count of 14.8 BNP 2580,  Stat ABG, ordered given lethargy, hypercarbic to pCO2 of 60, placed on BiPAP, monitor closely continuous pulse ox, if not improving transferred to ICU  Reculture, blood sputum  IV antibiotics with Vanc/cefepime, monitor levels, renal function, creatinine appears near  baseline  Give IV Lasix, is on Bumex 2 mg daily, monitor I's and O's BNP elevated  Increased respiratory hygiene, scheduled nebs  Pulmonology consulted given respiratory failure appreciate assistance  Discussed with podiatry at bedside, no plan for surgical intervention, local debridement, does not suspect acute septic arthritis or osteomyelitis based on imaging and exam.  Will continue local wound care  Levemir, SSI for diabetes monitor closely while on steroids  Continue aspirin and statin  Hold sedating medications, reorient, holding gabapentin will need to renally dose  Cont finasteride, isnt on flomax, has lopez  Will likely add flomax prior to removing lpoez, will try and verify why he's not on this  Monitor blood pressure continue home Coreg, will hold losartan and nifedipine reassess in the morning given patient sepsis, and renal function  Started on duloxetine tomorrow at lower dose monitor with renal function  Gabapentin changed to as needed at lower dose hold for sedation  Continue home iron and folic acid, checking iron studies, hemoglobin of 8.3, no bleeding noted, reassess in morning  Check am cbc, bmp, mg, phos  discussed initial management and workup with ED provider  based on the above problems patient warrants hospitalization for continued evaluation, treatment and monitoring    DVT prophylaxis:  Medical DVT prophylaxis orders are present.      Code: full  Diet: as tolerated cardiac/diabetic once mentation improves     Patient is critically ill due to ahhrf, sepsis, pna, chf, and issues as above.  I have spent 35 minutes of critical care time reviewing documentation, pertinent labs, imaging studies, examining the patient, modifying care plan, and discussing patient's condition and care plan with the patient, nursing and pulm and podiatry.      CBC          12/27/2023    04:25 12/28/2023    06:03 2/16/2024    17:07   CBC   WBC 12.04  11.04  14.84    RBC 3.25  3.17  3.08    Hemoglobin 9.2  9.1  8.3     Hematocrit 28.6  27.3  28.1    MCV 88.0  86.1  91.2    MCH 28.3  28.7  26.9    MCHC 32.2  33.3  29.5    RDW 13.3  13.2  14.5    Platelets 466  481  491        CMP          12/27/2023    04:25 12/28/2023    06:03 2/16/2024    16:45 2/16/2024    17:07   CMP   Glucose 143  77  277  281    BUN 37  37   29    Creatinine 2.00  1.78   1.78    EGFR 38.0  43.7   43.7    Sodium 135  139  135.8  138    Potassium 4.5  4.3   5.2    Chloride 96  101   102    Calcium 9.1  8.9   8.8    Total Protein    8.3    Albumin 2.8  3.0   2.8    Globulin    5.5    Total Bilirubin    0.2    Alkaline Phosphatase    214    AST (SGOT)    14    ALT (SGPT)    13    Albumin/Globulin Ratio    0.5    BUN/Creatinine Ratio 18.5  20.8   16.3    Anion Gap 8.8  9.5   8.2

## 2024-02-16 NOTE — PLAN OF CARE
Goal Outcome Evaluation:  Plan of Care Reviewed With: patient        Progress: no change  Outcome Evaluation: VSS. no complaints of pain. Podiatry changed foot dressing. Pt was on bipap, but changed to NC to eat dinner. Will preform covid swab. Will continue to monitor.    Vivien Pepper RN

## 2024-02-16 NOTE — PROGRESS NOTES
"[facility] Clinical Pharmacy Services: Vancomycin Pharmacokinetic Initial Consult Note    Preston Wallis is a 58 y.o. male who is on day 6 of vancomycin from another facility .     Consult Information  Consulting Provider: Rima  Planned Duration of  NewTherapy: 7 days  Was Patient Receiving Prior to Admission/Consult?:  Last dose today ,500mg iv @ 11:38 at [facility]  PK/PD Target: -600 mg/L.hr   Other Antimicrobials: Cefepime    Imaging Reviewed?: Yes    Microbiology Data      Vitals/Labs  Ht: 175.3 cm (69\"); Wt: 126 kg (276 lb 10.8 oz)  Temp (24hrs), Av.3 °F (36.3 °C), Min:97.3 °F (36.3 °C), Max:97.3 °F (36.3 °C)   CrCl cannot be calculated (Patient's most recent lab result is older than the maximum 30 days allowed.).       Results from last 7 days   Lab Units 24  1029 24  1440 24  1424   VANCOMYCIN TR ug/mL 17.5 26.1* 0.9*     Assessment/Plan:    Vancomycin Dose: Received dose today of 500mg @ 11:38 at [facility]    Vanc Random ordered for am.  Pharmacy will follow patient's kidney function and will adjust doses and obtain levels as necessary. Thank you for involving pharmacy in this patient's care. Please contact pharmacy with any questions or concerns.                           Hannah Alcantar  Clinical Pharmacist    "

## 2024-02-16 NOTE — CONSULTS
Pulmonary / Critical Care Consult Note      Patient Name: Preston Wallis  : 1965  MRN: [id number]  Primary Care Physician:  Kimmy Riley MD  Referring Physician: Shane Abarca MD  Date of admission: 2024    Subjective   Subjective     Reason for Consult/ Chief Complaint:   Hypercapnic and hypoxemic respiratory failure requiring NIPPV    HPI:  Preston Wallis is a 58 y.o. male with a past medical history of severe COPD with an FEV1 of 26%, bronchiectasis, history of recurrent Pseudomonas infections with MDRO Pseudomonas, history of PE in 2019 on Xarelto, tobacco abuse of cigarettes in remission, obstructive sleep apnea with home BiPAP machine presented to the ED for evaluation of altered mental status with hypercapnia from outside facility.  At the time of this consult patient was lying in bed obtunded.     Review of Systems  Unable to obtain      Personal History     Past Medical History:   Diagnosis Date    Age-related cognitive decline     Allergic contact dermatitis     Allergies     Anemia     Bronchiectasis with acute lower respiratory infection     Charcot foot due to diabetes mellitus 9/10/2013    Chronic diastolic (congestive) heart failure     Chronic kidney disease     Chronic respiratory failure with hypoxia     Closed supracondylar fracture of femur 2022    COPD (chronic obstructive pulmonary disease)     Deep vein thrombosis (DVT) of lower extremity associated with air travel 2023    Dependence on supplemental oxygen     Eczema     Erectile dysfunction     due to organic reasons    Essential (primary) hypertension     Fracture     closed fracture of other tarsal and metatarsal bones    Fracture of proximal humerus 2023    GERD without esophagitis     High risk medication use     Hypercholesteremia     Hypomagnesemia     Infected stasis ulcer of left lower extremity 2023    Insomnia     Low back pain     Major depressive disorder     Morbid (severe) obesity due  to excess calories     MRSA pneumonia     Muscle weakness     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 without evidence of necrosis     Obstructive sleep apnea (adult) (pediatric)     Other forms of dyspnea     Other long term (current) drug therapy     Other specified noninfective gastroenteritis and colitis     Other spondylosis, lumbar region     Pain in both knees     Paroxysmal atrial fibrillation     Peripheral neuropathy     attributed to type 2 diabetes    Pneumonia, unspecified organism     Polyneuropathy     Rash and other nonspecific skin eruption     Syncope and collapse     Tachycardia     Tinnitus 1/13/2023    Type 1 diabetes mellitus with diabetic chronic kidney disease     Type 2 diabetes mellitus     Unspecified fall, initial encounter     Urinary retention        Past Surgical History:   Procedure Laterality Date    CHOLECYSTECTOMY      CYSTOSCOPY      FEMUR SURGERY Left     Shravan placed    KNEE SURGERY Left     OTHER SURGICAL HISTORY Left     venous port    TIBIAL PLATEAU OPEN REDUCTION INTERNAL FIXATION Left 12/22/2023    Procedure: TIBIAL PLATEAU OPEN REDUCTION INTERNAL FIXATION;  Surgeon: Hugo Kline MD;  Location: [facility];  Service: Orthopedics;  Laterality: Left;    TONSILLECTOMY AND ADENOIDECTOMY         Family History: family history includes Asthma in his father; Cancer in his sister; Coronary artery disease in his mother; Diabetes type II in his mother and sister; Hypertension in his mother. Otherwise pertinent FHx was reviewed and not pertinent to current issue.    Social History:  reports that he quit smoking about 31 years ago. His smoking use included cigarettes. He started smoking about 43 years ago. He has a 12.00 pack-year smoking history. He has never used smokeless tobacco. He reports that he does not currently use alcohol. He reports that he does not use drugs.    Home Medications:  Budeson-Glycopyrrol-Formoterol,  DULoxetine, Ertugliflozin L-PyroglutamicAc, Fluticasone-Salmeterol, FreeStyle Bethany 2 [place], FreeStyle Bethany 2 Sensor, Insulin Glargine, Insulin Lispro (1 Unit Dial), Insulin Pen Needle, NIFEdipine XL, O2, Semaglutide (1 MG/DOSE), TRUEplus Lancets 28G, Vitamin D3, albuterol sulfate HFA, apixaban, bumetanide, calcium citrate, carvedilol, dapagliflozin, exenatide er, famotidine, ferrous gluconate, finasteride, folic acid, gabapentin, glucose blood, losartan, magnesium oxide, montelukast, potassium chloride, and silver sulfadiazine    Allergies:  Allergies   Allergen Reactions    Benadryl [Diphenhydramine] Itching    Proventil [Albuterol] Other (See Comments)     Mouth sores         Objective    Objective     Vitals:   Temp:  [97.3 °F (36.3 °C)] 97.3 °F (36.3 °C)  Heart Rate:  [76] 76  Resp:  [16] 16  BP: (155)/(87) 155/87  Flow (L/min):  [3] 3    Physical Exam:  Vital Signs Reviewed   Obese obtunded male  Scattered wheezes heard throughout all lung fields  Does grimace to stimuli  Does have lower extremity edema    Result Review    Result Review:  I have personally reviewed the results from the time of this admission to 2/16/2024 17:51 EST and agree with these findings:  [x]  Laboratory  [x]  Microbiology  [x]  Radiology  []  EKG/Telemetry   []  Cardiology/Vascular   []  Pathology  []  Old records  []  Other:  Most notable findings include:         Lab 02/16/24  1707 02/16/24  1645   WBC 14.84*  --    HEMOGLOBIN 8.3*  --    HEMATOCRIT 28.1*  --    PLATELETS 491*  --    SODIUM 138  --    SODIUM, ARTERIAL  --  135.8*   POTASSIUM 5.2  --    CHLORIDE 102  --    CO2 27.8  --    BUN 29*  --    CREATININE 1.78*  --    GLUCOSE 281*  --    GLUCOSE, ARTERIAL  --  277*   CALCIUM 8.8  --    PHOSPHORUS 2.9  --    TOTAL PROTEIN 8.3  --    ALBUMIN 2.8*  --    GLOBULIN 5.5  --        Assessment & Plan   Assessment / Plan     Active Hospital Problems:  Active Hospital Problems    Diagnosis     **Septic joint     Septic arthritis       Impression:  Acute on chronic hypoxemic and hypercapnic respiratory failure  Acute on chronic COPD exacerbation, FEV1 of 26%  CO2 narcosis respiratory\  Acidosis  Elevated BNP, 2590  ERIC on CKD  History of recurrent Pseudomonas infection with MDRO Pseudomonas  History of PE in July 2019  Tobacco abuse in remission    Plan:  -Place patient on NIPPV 14/7.  Patient's respiratory status remains tenuous at this time.  Has a low threshold for transfer  -Check ABG, 7.2 6/60/78.  Will repeat ABG at 1800  -Blood cultures pending.  Continue cefepime for now  -Check respiratory viral panel, Legionella, strep pneumo, and sputum culture  -Give Lasix 40 mg IV one-time  -Continue to monitor renal panel and electrolytes.  Replace electrolytes as necessary  -Continue bronchopulmonary hygiene.  -Continue Brovana, Pulmicort, and DuoNebs  -Rest of care per primary      After exam immediately ordered stat ABG as patient is obtunded  Patient was found to have a respiratory acidosis with a pH of 7.2 and a pCO2 of 16 and PaO2 of 78  Will start patient on BiPAP 14/7  Repeat blood gas in 1 hour  Avoid sedating medications  Will start on LABA LAMA and a corticosteroid  NT BNP elevated would benefit from diuresis if possible  DVT prophylaxis:  Medical DVT prophylaxis orders are present.    Code Status and Medical Interventions:   Ordered at: 02/16/24 1618     Code Status (Patient has no pulse and is not breathing):    CPR (Attempt to Resuscitate)     Medical Interventions (Patient has pulse or is breathing):    Full Support      Labs, imaging, notes and medications personally reviewed.  Discussed with primary and patient    Thank you for involving me in the care of this patient.    Electronically signed by CON Sampson, 02/16/24, 6:06 PM EST.    This visit was performed by BOTH a physician and an APC.  I spent more than 51% of time caring for this patient I personally evaluated and examined the patient. I performed all  aspects of MDM as documented. , I have reviewed and confirmed the accuracy of the patient's history as documented in this note. and I have reexamined the patient and the results are consistent with the previously documented exam. I have updated the documentation as necessary    Electronically signed by Walter Nicole DO, 02/16/24, 7:47 PM EST.

## 2024-02-16 NOTE — H&P
Gómez is a 58-year-old gentleman with past medical history of COPD with bronchiectasis on 2 L home oxygen, CKD with baseline creatinine between 1.4-1.7, DVT, obstructive sleep apnea, noncompliant with BiPAP, type 2 diabetes, nursing home resident and morbid obesity with BMI 41 who initially presented to [facility] on 2/11 with concern for pneumonia.  Patient was recently admitted to [place] with tibial plateau fracture.  Chest x-ray showed pneumonia and he had a leukocytosis.  He was started on broad-spectrum vancomycin and cefepime and admitted for further care.  In the process of managing his pneumonia, was noted that he had a area on his fifth metatarsal that was concerning for osteomyelitis and MRI actually showed septic arthritis.  Dr. Nelson was consulted and will see the patient for septic arthritis.  Patient is currently on cefepime and vancomycin.  CBC shows a white count of 20,000 and his creatinine is 2.0.  He was planned to be transferred to [facility] on 2/15 but he had an acute status change with hypercapnia and pH 7.23, CO2 71.  He was placed on BiPAP with improvement of his mental status.  He again refused to wear BiPAP overnight however he is back to his baseline mentation this morning.  ABG this morning showed pH 7.26, CO2 66.  However, he has on PCU, currently on 3 L nasal cannula, blood pressure 179/80, normal heart rate.  He is transferred to [facility] on 2/16.  Recommend consultation to Dr. Nelson and pulmonology upon arrival.  Plan to continue vancomycin and cefepime.    Of note, on 1/31 the patient had 2/2 blood cultures positive for MRSA.  Repeat blood cultures were negative, it was presumed to be from cellulitis of his foot and he was treated with Zyvox.  Repeat blood cultures this admission have been negative for MRSA.  He has never had a KARINA as his blood cultures cleared quickly.

## 2024-02-17 ENCOUNTER — APPOINTMENT (OUTPATIENT)
Dept: CT IMAGING | Facility: HOSPITAL | Age: 59
DRG: 871 | End: 2024-02-17
Payer: MEDICAID

## 2024-02-17 ENCOUNTER — APPOINTMENT (OUTPATIENT)
Dept: GENERAL RADIOLOGY | Facility: HOSPITAL | Age: 59
DRG: 871 | End: 2024-02-17
Payer: MEDICAID

## 2024-02-17 LAB
ANION GAP SERPL CALCULATED.3IONS-SCNC: 11.7 MMOL/L (ref 5–15)
ARTERIAL PATENCY WRIST A: POSITIVE
BASE EXCESS BLDA CALC-SCNC: -0.3 MMOL/L (ref -2–2)
BASOPHILS # BLD AUTO: 0.06 10*3/MM3 (ref 0–0.2)
BASOPHILS NFR BLD AUTO: 0.5 % (ref 0–1.5)
BDY SITE: ABNORMAL
BUN SERPL-MCNC: 31 MG/DL (ref 6–20)
BUN/CREAT SERPL: 17.8 (ref 7–25)
CA-I BLDA-SCNC: 1.18 MMOL/L (ref 1.13–1.32)
CALCIUM SPEC-SCNC: 8.8 MG/DL (ref 8.6–10.5)
CHLORIDE BLDA-SCNC: 105 MMOL/L (ref 98–106)
CHLORIDE SERPL-SCNC: 102 MMOL/L (ref 98–107)
CO2 SERPL-SCNC: 25.3 MMOL/L (ref 22–29)
COHGB MFR BLD: 0.2 % (ref 0–1.5)
CREAT SERPL-MCNC: 1.74 MG/DL (ref 0.76–1.27)
DEPRECATED RDW RBC AUTO: 46.2 FL (ref 37–54)
EGFRCR SERPLBLD CKD-EPI 2021: 44.9 ML/MIN/1.73
EOSINOPHIL # BLD AUTO: 0 10*3/MM3 (ref 0–0.4)
EOSINOPHIL NFR BLD AUTO: 0 % (ref 0.3–6.2)
ERYTHROCYTE [DISTWIDTH] IN BLOOD BY AUTOMATED COUNT: 14.4 % (ref 12.3–15.4)
FERRITIN SERPL-MCNC: 214.4 NG/ML (ref 30–400)
FHHB: 3.6 % (ref 0–5)
GAS FLOW AIRWAY: 3 LPM
GLUCOSE BLDA-MCNC: 249 MG/DL (ref 70–99)
GLUCOSE BLDC GLUCOMTR-MCNC: 152 MG/DL (ref 70–99)
GLUCOSE BLDC GLUCOMTR-MCNC: 182 MG/DL (ref 70–99)
GLUCOSE BLDC GLUCOMTR-MCNC: 208 MG/DL (ref 70–99)
GLUCOSE BLDC GLUCOMTR-MCNC: 91 MG/DL (ref 70–99)
GLUCOSE SERPL-MCNC: 268 MG/DL (ref 65–99)
HCO3 BLDA-SCNC: 24.1 MMOL/L (ref 22–26)
HCT VFR BLD AUTO: 26.7 % (ref 37.5–51)
HGB BLD-MCNC: 8.1 G/DL (ref 13–17.7)
HGB BLDA-MCNC: 9.5 G/DL (ref 13.8–16.4)
HOLD SPECIMEN: NORMAL
IMM GRANULOCYTES # BLD AUTO: 0.18 10*3/MM3 (ref 0–0.05)
IMM GRANULOCYTES NFR BLD AUTO: 1.4 % (ref 0–0.5)
INHALED O2 CONCENTRATION: 32 %
IRON 24H UR-MRATE: 32 MCG/DL (ref 59–158)
IRON SATN MFR SERPL: 16 % (ref 20–50)
L PNEUMO1 AG UR QL IA: NEGATIVE
LACTATE BLDA-SCNC: 1.25 MMOL/L (ref 0.5–2)
LYMPHOCYTES # BLD AUTO: 0.84 10*3/MM3 (ref 0.7–3.1)
LYMPHOCYTES NFR BLD AUTO: 6.4 % (ref 19.6–45.3)
MAGNESIUM SERPL-MCNC: 1.9 MG/DL (ref 1.6–2.6)
MCH RBC QN AUTO: 26.8 PG (ref 26.6–33)
MCHC RBC AUTO-ENTMCNC: 30.3 G/DL (ref 31.5–35.7)
MCV RBC AUTO: 88.4 FL (ref 79–97)
METHGB BLD QL: 0.1 % (ref 0–1.5)
MODALITY: ABNORMAL
MONOCYTES # BLD AUTO: 0.39 10*3/MM3 (ref 0.1–0.9)
MONOCYTES NFR BLD AUTO: 3 % (ref 5–12)
NEUTROPHILS NFR BLD AUTO: 11.69 10*3/MM3 (ref 1.7–7)
NEUTROPHILS NFR BLD AUTO: 88.7 % (ref 42.7–76)
NOTE: ABNORMAL
NRBC BLD AUTO-RTO: 0 /100 WBC (ref 0–0.2)
OXYHGB MFR BLDV: 96.1 % (ref 94–99)
PCO2 BLDA: 38.7 MM HG (ref 35–45)
PH BLDA: 7.41 PH UNITS (ref 7.35–7.45)
PHOSPHATE SERPL-MCNC: 2.3 MG/DL (ref 2.5–4.5)
PLATELET # BLD AUTO: 455 10*3/MM3 (ref 140–450)
PMV BLD AUTO: 8.7 FL (ref 6–12)
PO2 BLD: 267 MM[HG] (ref 0–500)
PO2 BLDA: 85.5 MM HG (ref 80–100)
POTASSIUM BLDA-SCNC: 4.23 MMOL/L (ref 3.5–5)
POTASSIUM SERPL-SCNC: 4.3 MMOL/L (ref 3.5–5.2)
RBC # BLD AUTO: 3.02 10*6/MM3 (ref 4.14–5.8)
S PNEUM AG SPEC QL LA: NEGATIVE
SAO2 % BLDCOA: 96.4 % (ref 95–99)
SODIUM BLDA-SCNC: 138.2 MMOL/L (ref 136–146)
SODIUM SERPL-SCNC: 139 MMOL/L (ref 136–145)
TIBC SERPL-MCNC: 200 MCG/DL (ref 298–536)
TRANSFERRIN SERPL-MCNC: 134 MG/DL (ref 200–360)
VANCOMYCIN SERPL-MCNC: 20.8 MCG/ML (ref 5–40)
WBC NRBC COR # BLD AUTO: 13.16 10*3/MM3 (ref 3.4–10.8)
WHOLE BLOOD HOLD COAG: NORMAL
WHOLE BLOOD HOLD SPECIMEN: NORMAL

## 2024-02-17 PROCEDURE — 84100 ASSAY OF PHOSPHORUS: CPT | Performed by: INTERNAL MEDICINE

## 2024-02-17 PROCEDURE — 82948 REAGENT STRIP/BLOOD GLUCOSE: CPT | Performed by: INTERNAL MEDICINE

## 2024-02-17 PROCEDURE — 71045 X-RAY EXAM CHEST 1 VIEW: CPT

## 2024-02-17 PROCEDURE — 99233 SBSQ HOSP IP/OBS HIGH 50: CPT | Performed by: INTERNAL MEDICINE

## 2024-02-17 PROCEDURE — 94799 UNLISTED PULMONARY SVC/PX: CPT

## 2024-02-17 PROCEDURE — 36600 WITHDRAWAL OF ARTERIAL BLOOD: CPT | Performed by: INTERNAL MEDICINE

## 2024-02-17 PROCEDURE — 63710000001 INSULIN DETEMIR PER 5 UNITS: Performed by: INTERNAL MEDICINE

## 2024-02-17 PROCEDURE — 25810000003 SODIUM CHLORIDE 0.9 % SOLUTION: Performed by: INTERNAL MEDICINE

## 2024-02-17 PROCEDURE — 25010000002 VANCOMYCIN 5 G RECONSTITUTED SOLUTION: Performed by: INTERNAL MEDICINE

## 2024-02-17 PROCEDURE — 87899 AGENT NOS ASSAY W/OPTIC: CPT

## 2024-02-17 PROCEDURE — 25010000002 BUMETANIDE PER 0.5 MG: Performed by: INTERNAL MEDICINE

## 2024-02-17 PROCEDURE — 94660 CPAP INITIATION&MGMT: CPT

## 2024-02-17 PROCEDURE — 25010000002 LABETALOL 5 MG/ML SOLUTION: Performed by: FAMILY MEDICINE

## 2024-02-17 PROCEDURE — 82805 BLOOD GASES W/O2 SATURATION: CPT | Performed by: INTERNAL MEDICINE

## 2024-02-17 PROCEDURE — 84466 ASSAY OF TRANSFERRIN: CPT | Performed by: INTERNAL MEDICINE

## 2024-02-17 PROCEDURE — 82728 ASSAY OF FERRITIN: CPT | Performed by: INTERNAL MEDICINE

## 2024-02-17 PROCEDURE — 87449 NOS EACH ORGANISM AG IA: CPT

## 2024-02-17 PROCEDURE — 85025 COMPLETE CBC W/AUTO DIFF WBC: CPT | Performed by: INTERNAL MEDICINE

## 2024-02-17 PROCEDURE — 83540 ASSAY OF IRON: CPT | Performed by: INTERNAL MEDICINE

## 2024-02-17 PROCEDURE — 63710000001 INSULIN LISPRO (HUMAN) PER 5 UNITS: Performed by: INTERNAL MEDICINE

## 2024-02-17 PROCEDURE — 80048 BASIC METABOLIC PNL TOTAL CA: CPT | Performed by: INTERNAL MEDICINE

## 2024-02-17 PROCEDURE — 80202 ASSAY OF VANCOMYCIN: CPT | Performed by: INTERNAL MEDICINE

## 2024-02-17 PROCEDURE — 82948 REAGENT STRIP/BLOOD GLUCOSE: CPT

## 2024-02-17 PROCEDURE — 82375 ASSAY CARBOXYHB QUANT: CPT | Performed by: INTERNAL MEDICINE

## 2024-02-17 PROCEDURE — 94761 N-INVAS EAR/PLS OXIMETRY MLT: CPT

## 2024-02-17 PROCEDURE — 83735 ASSAY OF MAGNESIUM: CPT | Performed by: INTERNAL MEDICINE

## 2024-02-17 PROCEDURE — 83050 HGB METHEMOGLOBIN QUAN: CPT | Performed by: INTERNAL MEDICINE

## 2024-02-17 PROCEDURE — 70450 CT HEAD/BRAIN W/O DYE: CPT

## 2024-02-17 PROCEDURE — 25010000002 CEFEPIME PER 500 MG: Performed by: INTERNAL MEDICINE

## 2024-02-17 RX ORDER — LABETALOL HYDROCHLORIDE 5 MG/ML
10 INJECTION, SOLUTION INTRAVENOUS ONCE
Status: COMPLETED | OUTPATIENT
Start: 2024-02-17 | End: 2024-02-17

## 2024-02-17 RX ORDER — BUMETANIDE 0.25 MG/ML
2 INJECTION INTRAMUSCULAR; INTRAVENOUS DAILY
Status: DISCONTINUED | OUTPATIENT
Start: 2024-02-17 | End: 2024-02-24

## 2024-02-17 RX ORDER — TAMSULOSIN HYDROCHLORIDE 0.4 MG/1
0.4 CAPSULE ORAL DAILY
Status: DISCONTINUED | OUTPATIENT
Start: 2024-02-17 | End: 2024-02-19

## 2024-02-17 RX ORDER — DULOXETIN HYDROCHLORIDE 30 MG/1
30 CAPSULE, DELAYED RELEASE ORAL DAILY
Status: DISCONTINUED | OUTPATIENT
Start: 2024-02-18 | End: 2024-02-25 | Stop reason: HOSPADM

## 2024-02-17 RX ADMIN — IPRATROPIUM BROMIDE AND ALBUTEROL SULFATE 3 ML: .5; 3 SOLUTION RESPIRATORY (INHALATION) at 07:22

## 2024-02-17 RX ADMIN — Medication 250 MG: at 09:24

## 2024-02-17 RX ADMIN — INSULIN LISPRO 4 UNITS: 100 INJECTION, SOLUTION INTRAVENOUS; SUBCUTANEOUS at 21:58

## 2024-02-17 RX ADMIN — SODIUM HYPOCHLORITE: 1.25 SOLUTION TOPICAL at 09:27

## 2024-02-17 RX ADMIN — INSULIN LISPRO 4 UNITS: 100 INJECTION, SOLUTION INTRAVENOUS; SUBCUTANEOUS at 12:00

## 2024-02-17 RX ADMIN — CEFEPIME 2000 MG: 2 INJECTION, POWDER, FOR SOLUTION INTRAVENOUS at 16:53

## 2024-02-17 RX ADMIN — CEFEPIME 2000 MG: 2 INJECTION, POWDER, FOR SOLUTION INTRAVENOUS at 23:09

## 2024-02-17 RX ADMIN — INSULIN DETEMIR 30 UNITS: 100 INJECTION, SOLUTION SUBCUTANEOUS at 09:24

## 2024-02-17 RX ADMIN — APIXABAN 5 MG: 5 TABLET, FILM COATED ORAL at 09:24

## 2024-02-17 RX ADMIN — FINASTERIDE 5 MG: 5 TABLET, FILM COATED ORAL at 09:24

## 2024-02-17 RX ADMIN — MONTELUKAST 10 MG: 10 TABLET, FILM COATED ORAL at 21:46

## 2024-02-17 RX ADMIN — IPRATROPIUM BROMIDE AND ALBUTEROL SULFATE 3 ML: .5; 3 SOLUTION RESPIRATORY (INHALATION) at 18:19

## 2024-02-17 RX ADMIN — Medication 250 MG: at 21:46

## 2024-02-17 RX ADMIN — FOLIC ACID 1 MG: 1 TABLET ORAL at 09:24

## 2024-02-17 RX ADMIN — ARFORMOTEROL TARTRATE 15 MCG: 15 SOLUTION RESPIRATORY (INHALATION) at 18:19

## 2024-02-17 RX ADMIN — GUAIFENESIN 600 MG: 600 TABLET ORAL at 09:24

## 2024-02-17 RX ADMIN — Medication 400 MG: at 21:46

## 2024-02-17 RX ADMIN — DULOXETINE HYDROCHLORIDE 30 MG: 30 CAPSULE, DELAYED RELEASE ORAL at 09:25

## 2024-02-17 RX ADMIN — INSULIN LISPRO 8 UNITS: 100 INJECTION, SOLUTION INTRAVENOUS; SUBCUTANEOUS at 07:39

## 2024-02-17 RX ADMIN — ARFORMOTEROL TARTRATE 15 MCG: 15 SOLUTION RESPIRATORY (INHALATION) at 07:22

## 2024-02-17 RX ADMIN — HYDROXYZINE HYDROCHLORIDE 25 MG: 25 TABLET, FILM COATED ORAL at 08:06

## 2024-02-17 RX ADMIN — CEFEPIME 2000 MG: 2 INJECTION, POWDER, FOR SOLUTION INTRAVENOUS at 07:40

## 2024-02-17 RX ADMIN — CARVEDILOL 25 MG: 25 TABLET, FILM COATED ORAL at 21:46

## 2024-02-17 RX ADMIN — VANCOMYCIN HYDROCHLORIDE 1000 MG: 5 INJECTION, POWDER, LYOPHILIZED, FOR SOLUTION INTRAVENOUS at 15:34

## 2024-02-17 RX ADMIN — Medication 10 ML: at 21:47

## 2024-02-17 RX ADMIN — FAMOTIDINE 40 MG: 20 TABLET ORAL at 08:01

## 2024-02-17 RX ADMIN — Medication 400 MG: at 09:24

## 2024-02-17 RX ADMIN — SODIUM HYPOCHLORITE: 1.25 SOLUTION TOPICAL at 21:46

## 2024-02-17 RX ADMIN — HYDROXYZINE HYDROCHLORIDE 25 MG: 25 TABLET, FILM COATED ORAL at 15:34

## 2024-02-17 RX ADMIN — IPRATROPIUM BROMIDE AND ALBUTEROL SULFATE 3 ML: .5; 3 SOLUTION RESPIRATORY (INHALATION) at 13:37

## 2024-02-17 RX ADMIN — GABAPENTIN 100 MG: 100 CAPSULE ORAL at 23:09

## 2024-02-17 RX ADMIN — TAMSULOSIN HYDROCHLORIDE 0.4 MG: 0.4 CAPSULE ORAL at 09:24

## 2024-02-17 RX ADMIN — FERROUS SULFATE TAB 325 MG (65 MG ELEMENTAL FE) 325 MG: 325 (65 FE) TAB at 07:40

## 2024-02-17 RX ADMIN — CARVEDILOL 25 MG: 25 TABLET, FILM COATED ORAL at 09:24

## 2024-02-17 RX ADMIN — BUDESONIDE 0.5 MG: 0.5 INHALANT ORAL at 07:22

## 2024-02-17 RX ADMIN — Medication 10 ML: at 09:26

## 2024-02-17 RX ADMIN — GUAIFENESIN 600 MG: 600 TABLET ORAL at 21:46

## 2024-02-17 RX ADMIN — BUMETANIDE 2 MG: 0.25 INJECTION INTRAMUSCULAR; INTRAVENOUS at 10:11

## 2024-02-17 RX ADMIN — DOCUSATE SODIUM 50MG AND SENNOSIDES 8.6MG 2 TABLET: 8.6; 5 TABLET, FILM COATED ORAL at 09:24

## 2024-02-17 RX ADMIN — BUDESONIDE 0.5 MG: 0.5 INHALANT ORAL at 18:19

## 2024-02-17 RX ADMIN — IPRATROPIUM BROMIDE AND ALBUTEROL SULFATE 3 ML: .5; 3 SOLUTION RESPIRATORY (INHALATION) at 01:24

## 2024-02-17 RX ADMIN — APIXABAN 5 MG: 5 TABLET, FILM COATED ORAL at 21:46

## 2024-02-17 RX ADMIN — LABETALOL HYDROCHLORIDE 10 MG: 5 INJECTION, SOLUTION INTRAVENOUS at 04:26

## 2024-02-17 RX ADMIN — INSULIN DETEMIR 30 UNITS: 100 INJECTION, SOLUTION SUBCUTANEOUS at 21:57

## 2024-02-17 NOTE — PROGRESS NOTES
RT EQUIPMENT DEVICE RELATED - SKIN ASSESSMENT    RT Medical Equipment/Device:     NIV Mask:  Under-the-nose   size:  B    Skin Assessment:      Neck:  Intact  Nose:  Intact  Lips:  Intact  Mouth:  Intact    Device Skin Pressure Protection:  Pressure points protected    Nurse Notification:  Kaylin Antonio, RRT

## 2024-02-17 NOTE — PLAN OF CARE
Goal Outcome Evaluation:   Patient has been on Continuous BIPAP 14/7 throughout the night. Patient has tolerated well. Around 0030 patient removed mask. ABG was drawn and showed improvement. Patient wanted to go back on mask. Patient is tolerating well. FIO2 titrated to 25%                                            admission

## 2024-02-17 NOTE — PROGRESS NOTES
RT EQUIPMENT DEVICE RELATED - SKIN ASSESSMENT    RT Medical Equipment/Device:     NIV Mask:  Under-the-nose   size:       Skin Assessment:      Cheek:  Intact  Chin:  Intact  Forehead:  Intact  Nose:  Intact  Mouth:  Intact    Device Skin Pressure Protection:  Pressure points protected    Nurse Notification:  Kaylin Daniels, RRT

## 2024-02-17 NOTE — PROGRESS NOTES
[facility]   Hospitalist Progress Note    Date of admission: 2/16/2024  Patient Name: Preston Wallis  1965  Date: 2/17/2024      Subjective     No chief complaint on file.    Interval Followup: Patient RRT overnight for worsening confusion, CT head was obtained motion limited by was negative.  Patient doing better after wearing continuous BiPAP overnight.  Has confusion about timeline of events does not remember talking with me yesterday.  Feel little less short of air today.  Has some itching in his skin near wound       Objective     Vitals:   Temp:  [97.2 °F (36.2 °C)-98.2 °F (36.8 °C)] 97.2 °F (36.2 °C)  Heart Rate:  [75-95] 76  Resp:  [16-24] 18  BP: (114-175)/(53-89) 114/62  Flow (L/min):  [2-3] 2    Physical Exam  Chronic ill-appearing morbidly obese in bed tired slightly more awake and conversant today does require some prompting at times  Bilateral rhonchi and wheezing, diminished at bases slightly improved aeration compared to yesterday currently on nasal cannula  No RRR  Abdomen soft obese  Left lower extremity wound dressed no discharge or drainage    Result Review:  Vital signs, labs and recent relevant imaging reviewed.        acetaminophen    albuterol    aluminum-magnesium hydroxide-simethicone    benzonatate    senna-docusate sodium **AND** polyethylene glycol **AND** bisacodyl **AND** bisacodyl    dextrose    dextrose    Diclofenac Sodium    gabapentin    glucagon (human recombinant)    guaiFENesin-dextromethorphan    hydrALAZINE    hydrOXYzine    Lidocaine    melatonin    nicotine    ondansetron    Pharmacy Consult - Pharmacy to dose    Pharmacy to dose vancomycin    prochlorperazine    sodium chloride    sodium chloride    sodium chloride    apixaban, 5 mg, Oral, Q12H  arformoterol, 15 mcg, Nebulization, BID - RT  budesonide, 0.5 mg, Nebulization, BID - RT  bumetanide, 2 mg, Intravenous, Daily  carvedilol, 25 mg, Oral, Q12H  cefepime, 2,000 mg, Intravenous, Q8H  DULoxetine, 30 mg, Oral,  BID  famotidine, 40 mg, Oral, QAM  ferrous sulfate, 325 mg, Oral, Daily With Breakfast  finasteride, 5 mg, Oral, Daily  folic acid, 1 mg, Oral, Daily  guaiFENesin, 600 mg, Oral, Q12H  insulin detemir, 30 Units, Subcutaneous, BID  insulin lispro, 4-24 Units, Subcutaneous, 4x Daily AC & at Bedtime  ipratropium-albuterol, 3 mL, Nebulization, 4x Daily - RT  [Held by provider] losartan, 25 mg, Oral, Q24H  magnesium oxide, 400 mg, Oral, BID  montelukast, 10 mg, Oral, Nightly  [Held by provider] NIFEdipine XL, 30 mg, Oral, QAM  saccharomyces boulardii, 250 mg, Oral, BID  senna-docusate sodium, 2 tablet, Oral, BID  sodium chloride, 10 mL, Intravenous, Q12H  Sodium Hypochlorite, , Topical, Q12H  tamsulosin, 0.4 mg, Oral, Daily  vancomycin, 1,000 mg, Intravenous, Once        CT Head Without Contrast    Result Date: 2/17/2024   Motion degraded exam with no definite acute abnormalities.     MIAH GREEN MD       Electronically Signed and Approved By: MIAH GREEN MD on 2/17/2024 at 1:21             XR Chest 1 View    Result Date: 2/17/2024   No significant interval change.       MIAH GREEN MD       Electronically Signed and Approved By: MIAH GREEN MD on 2/17/2024 at 1:01             XR Chest 1 View    Result Date: 2/16/2024    1. There is borderline heart size with pulmonary vascular congestion 2. Chronic bilateral airspace disease       WHIT HARDIN MD       Electronically Signed and Approved By: WHIT HARDIN MD on 2/16/2024 at 16:52             XR chest AP portable    Result Date: 2/16/2024  No significant interval change. Images personally reviewed, interpreted and dictated by SHANT Velasquez.          MRI foot left with and without contrast    Result Date: 2/14/2024  1. Soft tissue ulceration along the plantar/lateral aspect of the forefoot adjacent to the fifth MTP joint with adjacent cellulitis. No abnormal fluid collections to suggest abscess. 2. There is bone marrow edema in the fifth digit  proximal phalanx and distal fifth metatarsal and a trace amount of joint effusion. Findings are concerning for septic arthritis with reactive marrow edema. No definite osseous destructive changes/abnormal bone marrow signal changes to suggest osteomyelitis. 3. Mild multijoint degenerative changes of the remaining MTP joints with a trace amount of joint effusion, and without abnormal MRI signal changes or enhancement, likely representing degenerative effusions. Images personally reviewed, interpreted and dictated by SHANT Velasquez.    XR Foot 3+ View Left    Result Date: 2/11/2024  No acute osseous abnormality. Images reviewed, interpreted, and dictated by Dr. Abad Billingsley. Transcribed by Joseph Denton PA-C.    X-ray chest AP only    Result Date: 2/11/2024  Right suprahilar pneumonia superimposed on chronic change. Images reviewed, interpreted, and dictated by Dr. Abad Billingsley. Transcribed by Joseph Denton PA-C.    CT chest for pulmonary embolus    Result Date: 2/11/2024  1. No evidence of pulmonary embolism. 2. New bilateral extensive multifocal pneumonia, right greater than left. Pneumonia is predominantly peripheral to the areas of chronic cystic bronchiectasis.    This study was performed using dose reduction techniques to achieve radiation exposure as low as reasonably achievable (ALARA)      2/2/24 echo from OSH  Normal LV wall motion with estimated LV ejection fraction 50 to 55%   Mild concentric LVH   Normal valvular structure and function       Assessment / Plan     Summary: 58-year-old male morbidly obese COPD on 2 L baseline, CKD 3/4, MILADY not adherent to BiPAP, chronic nursing home resident who presented to the outside hospital on 2/11 with pneumonia.  Patient was recently admitted to [place] with tibial plateau fracture.     Assessment/Plan (clinically significant if listed here)  Sepsis secondary to Pneumonia, bacterial  Acute hypoxemic hypercapnic respiratory failure  MILADY  noncompliant with home bipap  Acute metabolic encephalopathy in setting of above  Diabetic foot Infection/SSTI, unspecified organism   Diastolic CHF with exacerbation 2/2024 from outside hospital EF 50%  CKD3/4 baseline creatinine ~1.4-1.7  Paroxysmal Afib on apixaban  CAD  COPD with exacerbation, baseline 2 L oxygen  Hx of DVT  IDDM2  Iron deficiency anemia   BPH  Chronic NH resident  Morbid Obesity    Monitor white count, 13, follow infectious studies  Continue IV vancomycin and cefepime for pneumonia and foot infection,  narrow further as able, monitor cultures, Florastor  Continue p.o. Pulmicort scheduled nebs respite hygiene  Monitor sedation, continuous pulse ox, has increasing lethargy need to be placed back on BiPAP, suspect episodes of increased confusion related to hypercarbia, has CT head overnight motion limited but no acute infarct noted  Give additional IV diuretics today monitor I's and O's   Appreciate podiatry assistance, no additional surgical invention need further exam  Discussed with pulmonology appreciate assistance, monitoring BiPAP and continue respiratory hygiene management as above  Continue Coreg holding other blood pressure medications monitor  Decrease duloxetine to once daily question if this is contributing sedation, hold gabapentin, use lidocaine prn if needed, if resuming gabapentin will use lower dose   Hemoglobin 8, similar to yesterday no bleeding, TSAT 16 iron 32,  Continue on iron  Continue insulin monitor  Add Flomax continue finasteride Doyle for now voiding trial in the next couple days  PT/OT  Check a.m. CBC, BMP, magnesium, phosphorus  Continue hospitalization at current level of care, telemetry, still high risk for decompensation given his continuous BiPAP need and only recently able to transition off with high risk study will likely need this again.  Will minimally needed for sleep and naps        DVT prophylaxis:  Medical DVT prophylaxis orders are present.        Code  Status (Patient has no pulse and is not breathing): CPR (Attempt to Resuscitate)  Medical Interventions (Patient has pulse or is breathing): Full Support        CBC          12/28/2023    06:03 2/16/2024    17:07 2/17/2024    01:17   CBC   WBC 11.04  14.84  13.16    RBC 3.17  3.08  3.02    Hemoglobin 9.1  8.3  8.1    Hematocrit 27.3  28.1  26.7    MCV 86.1  91.2  88.4    MCH 28.7  26.9  26.8    MCHC 33.3  29.5  30.3    RDW 13.2  14.5  14.4    Platelets 481  491  455        CMP          12/28/2023    06:03 2/16/2024    16:45 2/16/2024    17:07 2/17/2024    00:44 2/17/2024    01:17   CMP   Glucose 77  277  281  249  268    BUN 37   29   31    Creatinine 1.78   1.78   1.74    EGFR 43.7   43.7   44.9    Sodium 139  135.8  138  138.2  139    Potassium 4.3   5.2   4.3    Chloride 101   102   102    Calcium 8.9   8.8   8.8    Total Protein   8.3      Albumin 3.0   2.8      Globulin   5.5      Total Bilirubin   0.2      Alkaline Phosphatase   214      AST (SGOT)   14      ALT (SGPT)   13      Albumin/Globulin Ratio   0.5      BUN/Creatinine Ratio 20.8   16.3   17.8    Anion Gap 9.5   8.2   11.7

## 2024-02-17 NOTE — PROGRESS NOTES
Pulmonary / Critical Care Progress Note      Patient Name: Preston Wallis  : 1965  MRN: [id number]  Primary Care Physician:  Kimmy Riley MD  Date of admission: 2024    Subjective   Subjective   Follow-up for hypercapnic and hypoxemic respiratory failure requiring NIPPV    Over past 24 hours: Remains on NIPPV.  Continues Brovana, Pulmicort, and DuoNebs.  Remains on Bumex 2 mg IV daily    No acute events overnight.     This morning,   On NIPPV  Lying in bed  Mentation much improved  Denies any chest pain or chest tightness  ABG much improved  No fever or chills  Tmax 98.2  Diuresing well  -2.5 L urine output    Review of Systems  General: + Fatigue, No Fever,   HEENT: No dysphagia, No Visual Changes, no rhinorrhea  Respiratory:  + Productive cough,+Dyspnea, -phlegm, No Pleuritic Pain, + wheezing, no hemoptysis  Cardiovascular: Denies chest pain, denies palpitations,+FREITAS, No Chest Pressure  Gastrointestinal:  No Abdominal Pain, No Nausea, No Vomiting, No Diarrhea  Genitourinary:  No Dysuria, No Frequency, No Hesitancy  Musculoskeletal: No muscle pain or swelling  Endocrine:  No Heat Intolerance, No Cold Intolerance  Hematologic:  No Bleeding, No Bruising  Psychiatric:  No Anxiety, No Depression  Neurologic:  No Confusion, no Dysarthria, No Headaches  Skin:  No Rash, No Open Wounds    Objective   Objective     Vitals:   Temp:  [97.3 °F (36.3 °C)-98.1 °F (36.7 °C)] 98.1 °F (36.7 °C)  Heart Rate:  [76-95] 86  Resp:  [16-22] 18  BP: (149-175)/(61-89) 175/70  Flow (L/min):  [3] 3  Physical Exam   Vital Signs Reviewed   General:  WDWN, Alert, NAD.    HEENT:  PERRL, EOMI.  OP, nares clear, no sinus tenderness  Neck:  Supple, no JVD, no thyromegaly  Chest:  good aeration, clear to auscultation bilaterally, tympanic to percussion bilaterally, no work of breathing noted  CV: RRR, no MGR, pulses 2+, equal.  Abd:  Soft, NT, ND, + BS, no HSM  EXT:  no clubbing, no cyanosis, no edema  Neuro:  A&Ox3, CN grossly  intact, no focal deficits.  Skin: No rashes or lesions noted    Result Review    Result Review:  I have personally reviewed the results from the time of this admission to 2/17/2024 07:08 EST and agree with these findings:  [x]  Laboratory  [x]  Microbiology  [x]  Radiology  [x]  EKG/Telemetry   [x]  Cardiology/Vascular   []  Pathology  []  Old records  []  Other:  Most notable findings include:         Lab 02/17/24  0117 02/17/24  0044 02/16/24  1707 02/16/24  1645   WBC 13.16*  --  14.84*  --    HEMOGLOBIN 8.1*  --  8.3*  --    HEMATOCRIT 26.7*  --  28.1*  --    PLATELETS 455*  --  491*  --    SODIUM 139  --  138  --    SODIUM, ARTERIAL  --  138.2  --  135.8*   POTASSIUM 4.3  --  5.2  --    CHLORIDE 102  --  102  --    CO2 25.3  --  27.8  --    BUN 31*  --  29*  --    CREATININE 1.74*  --  1.78*  --    GLUCOSE 268*  --  281*  --    GLUCOSE, ARTERIAL  --  249*  --  277*   CALCIUM 8.8  --  8.8  --    PHOSPHORUS 2.3*  --  2.9  --    TOTAL PROTEIN  --   --  8.3  --    ALBUMIN  --   --  2.8*  --    GLOBULIN  --   --  5.5  --        Assessment & Plan   Assessment / Plan     Active Hospital Problems:  Active Hospital Problems    Diagnosis     **Septic joint     Septic arthritis     Skin ulcer of left heel, limited to breakdown of skin     Ulcer of left foot, limited to breakdown of skin     Left foot pain     Type 2 DM with CKD stage 3 and hypertension     Polyneuropathy      Impression:   Acute on chronic hypoxemic and hypercapnic respiratory failure  Acute on chronic COPD exacerbation, FEV1 of 26%  CO2 narcosis respiratory  Acidosis  Elevated BNP, 2590  ERIC on CKD  History of recurrent Pseudomonas infection with MDRO Pseudomonas  History of PE in July 2019  Tobacco abuse in remission    Plan:   -Continue to wean supplemental oxygen to maintain SpO2 greater than 90%.  Patient transitioned to 2 L  -Encourage NIPPV wear at nighttime and as needed during naps  -Morning ABG much improved, 7.41/38/85  -Blood cultures pending.   Continue cefepime for septic arthritis  -Respiratory viral panel negative. Legionella, strep pneumo, and sputum culture pending  -Continue Bumex 2 mg IV daily  -Continue to monitor renal panel and electrolytes.  Replace electrolytes as necessary  -Continue bronchopulmonary hygiene.  -Continue Brovana, Pulmicort, and DuoNebs  -On Eliquis for A-fib  -Rest of care per primary     DVT prophylaxis:  Medical DVT prophylaxis orders are present.    CODE STATUS:   Code Status (Patient has no pulse and is not breathing): CPR (Attempt to Resuscitate)  Medical Interventions (Patient has pulse or is breathing): Full Support    I personally reviewed pertinent labs, imaging and provider notes. Discussed with bedside nurse and will discuss with primary service.     Electronically signed by CON Sampson, 02/17/24, 11:01 AM EST.    Electronically signed by Martín Rivera MD, 2/17/2024, 07:08 EST.    This visit was performed by BOTH a physician and an APC. I personally evaluated and examined the patient. I performed all aspects of MDM as documented. , I have reviewed and confirmed the accuracy of the patient's history as documented in this note., and I have reexamined the patient and the results are consistent with the previously documented exam. I have updated the documentation as necessary.     Electronically signed by Martín Rivera MD, 02/17/24, 5:20 PM EST.

## 2024-02-17 NOTE — PROGRESS NOTES
"[facility] Clinical Pharmacy Services: Vancomycin Monitoring Note    Preston Wallis is a 58 y.o. male who is on day 7  of  vancomycin (received 6 days at outside hospital)    Previous Vancomycin Dose:   500 mg IV   @1138 ([place]) yesterday  Imaging Reviewed?: Yes  Updated Cultures and Sensitivities:    resp cx ordered    Vitals/Labs  Ht: 175.3 cm (69\"); Wt: 126 kg (276 lb 10.8 oz)   Temp (24hrs), Av.8 °F (36.6 °C), Min:97.2 °F (36.2 °C), Max:98.2 °F (36.8 °C)   Estimated Creatinine Clearance: 60.7 mL/min (A) (by C-G formula based on SCr of 1.74 mg/dL (H)).       Results from last 7 days   Lab Units 24  0117 24  1707 24  1029 24  1440 24  1424   VANCOMYCIN RM mcg/mL 20.80  --   --   --   --    VANCOMYCIN TR ug/mL  --   --  17.5 26.1* 0.9*   CREATININE mg/dL 1.74* 1.78*  --   --   --    WBC 10*3/mm3 13.16* 14.84*  --   --   --      Assessment/Plan    Note with outside administration and levels, Insight program not properly calculating data.  Will redose vancomycin at this time with 1000mg iv x 1 .     Next Vanc Random ordered for am.  We will continue to monitor patient changes and renal function     Thank you for involving pharmacy in this patient's care. Please contact pharmacy with any questions or concerns.    Hannah Alcantar  Clinical Pharmacist   "

## 2024-02-17 NOTE — PLAN OF CARE
Goal Outcome Evaluation:      Patient pleasant through shift. VSS. Encouraged to use Bipap. No complaints of pain or nausea. Dressing changed. Tolerated well. Complaints of itching treated with PRN medicine. See MAR. Call light and table within reach. No concerns per patient at this time.   Eh Prakash RN

## 2024-02-18 LAB
ABO GROUP BLD: NORMAL
ABO GROUP BLD: NORMAL
ANION GAP SERPL CALCULATED.3IONS-SCNC: 8.1 MMOL/L (ref 5–15)
BASOPHILS # BLD AUTO: 0.13 10*3/MM3 (ref 0–0.2)
BASOPHILS NFR BLD AUTO: 1 % (ref 0–1.5)
BLD GP AB SCN SERPL QL: NEGATIVE
BUN SERPL-MCNC: 30 MG/DL (ref 6–20)
BUN/CREAT SERPL: 17 (ref 7–25)
CALCIUM SPEC-SCNC: 8.5 MG/DL (ref 8.6–10.5)
CHLORIDE SERPL-SCNC: 102 MMOL/L (ref 98–107)
CO2 SERPL-SCNC: 27.9 MMOL/L (ref 22–29)
CREAT SERPL-MCNC: 1.76 MG/DL (ref 0.76–1.27)
DEPRECATED RDW RBC AUTO: 47.2 FL (ref 37–54)
EGFRCR SERPLBLD CKD-EPI 2021: 44.3 ML/MIN/1.73
EOSINOPHIL # BLD AUTO: 0.45 10*3/MM3 (ref 0–0.4)
EOSINOPHIL NFR BLD AUTO: 3.6 % (ref 0.3–6.2)
ERYTHROCYTE [DISTWIDTH] IN BLOOD BY AUTOMATED COUNT: 14.7 % (ref 12.3–15.4)
GLUCOSE BLDC GLUCOMTR-MCNC: 106 MG/DL (ref 70–99)
GLUCOSE BLDC GLUCOMTR-MCNC: 138 MG/DL (ref 70–99)
GLUCOSE BLDC GLUCOMTR-MCNC: 85 MG/DL (ref 70–99)
GLUCOSE BLDC GLUCOMTR-MCNC: 94 MG/DL (ref 70–99)
GLUCOSE SERPL-MCNC: 130 MG/DL (ref 65–99)
HCT VFR BLD AUTO: 24.1 % (ref 37.5–51)
HCT VFR BLD AUTO: 24.7 % (ref 37.5–51)
HGB BLD-MCNC: 7.1 G/DL (ref 13–17.7)
HGB BLD-MCNC: 7.5 G/DL (ref 13–17.7)
IMM GRANULOCYTES # BLD AUTO: 0.13 10*3/MM3 (ref 0–0.05)
IMM GRANULOCYTES NFR BLD AUTO: 1 % (ref 0–0.5)
LYMPHOCYTES # BLD AUTO: 2.49 10*3/MM3 (ref 0.7–3.1)
LYMPHOCYTES NFR BLD AUTO: 19.9 % (ref 19.6–45.3)
MAGNESIUM SERPL-MCNC: 1.8 MG/DL (ref 1.6–2.6)
MCH RBC QN AUTO: 26.5 PG (ref 26.6–33)
MCHC RBC AUTO-ENTMCNC: 29.5 G/DL (ref 31.5–35.7)
MCV RBC AUTO: 89.9 FL (ref 79–97)
MONOCYTES # BLD AUTO: 1.26 10*3/MM3 (ref 0.1–0.9)
MONOCYTES NFR BLD AUTO: 10.1 % (ref 5–12)
NEUTROPHILS NFR BLD AUTO: 64.4 % (ref 42.7–76)
NEUTROPHILS NFR BLD AUTO: 8.07 10*3/MM3 (ref 1.7–7)
NRBC BLD AUTO-RTO: 0 /100 WBC (ref 0–0.2)
PHOSPHATE SERPL-MCNC: 3 MG/DL (ref 2.5–4.5)
PLATELET # BLD AUTO: 454 10*3/MM3 (ref 140–450)
PMV BLD AUTO: 8.6 FL (ref 6–12)
POTASSIUM SERPL-SCNC: 4.4 MMOL/L (ref 3.5–5.2)
RBC # BLD AUTO: 2.68 10*6/MM3 (ref 4.14–5.8)
RH BLD: NEGATIVE
RH BLD: NEGATIVE
SODIUM SERPL-SCNC: 138 MMOL/L (ref 136–145)
T&S EXPIRATION DATE: NORMAL
VANCOMYCIN SERPL-MCNC: 21.93 MCG/ML (ref 5–40)
VANCOMYCIN SERPL-MCNC: 23.63 MCG/ML (ref 5–40)
VANCOMYCIN SERPL-MCNC: 24 MCG/ML (ref 5–40)
WBC NRBC COR # BLD AUTO: 12.53 10*3/MM3 (ref 3.4–10.8)

## 2024-02-18 PROCEDURE — 84100 ASSAY OF PHOSPHORUS: CPT | Performed by: INTERNAL MEDICINE

## 2024-02-18 PROCEDURE — 80202 ASSAY OF VANCOMYCIN: CPT | Performed by: INTERNAL MEDICINE

## 2024-02-18 PROCEDURE — 86850 RBC ANTIBODY SCREEN: CPT | Performed by: INTERNAL MEDICINE

## 2024-02-18 PROCEDURE — 63710000001 INSULIN DETEMIR PER 5 UNITS: Performed by: INTERNAL MEDICINE

## 2024-02-18 PROCEDURE — 94799 UNLISTED PULMONARY SVC/PX: CPT

## 2024-02-18 PROCEDURE — 86900 BLOOD TYPING SEROLOGIC ABO: CPT | Performed by: INTERNAL MEDICINE

## 2024-02-18 PROCEDURE — 25010000002 CEFEPIME PER 500 MG: Performed by: INTERNAL MEDICINE

## 2024-02-18 PROCEDURE — 99232 SBSQ HOSP IP/OBS MODERATE 35: CPT | Performed by: PODIATRIST

## 2024-02-18 PROCEDURE — 87070 CULTURE OTHR SPECIMN AEROBIC: CPT

## 2024-02-18 PROCEDURE — 94664 DEMO&/EVAL PT USE INHALER: CPT

## 2024-02-18 PROCEDURE — 99233 SBSQ HOSP IP/OBS HIGH 50: CPT | Performed by: INTERNAL MEDICINE

## 2024-02-18 PROCEDURE — 25010000002 BUMETANIDE PER 0.5 MG: Performed by: INTERNAL MEDICINE

## 2024-02-18 PROCEDURE — 86901 BLOOD TYPING SEROLOGIC RH(D): CPT | Performed by: INTERNAL MEDICINE

## 2024-02-18 PROCEDURE — 82948 REAGENT STRIP/BLOOD GLUCOSE: CPT

## 2024-02-18 PROCEDURE — 85014 HEMATOCRIT: CPT | Performed by: INTERNAL MEDICINE

## 2024-02-18 PROCEDURE — 83735 ASSAY OF MAGNESIUM: CPT | Performed by: INTERNAL MEDICINE

## 2024-02-18 PROCEDURE — 80048 BASIC METABOLIC PNL TOTAL CA: CPT | Performed by: INTERNAL MEDICINE

## 2024-02-18 PROCEDURE — 94761 N-INVAS EAR/PLS OXIMETRY MLT: CPT

## 2024-02-18 PROCEDURE — 85025 COMPLETE CBC W/AUTO DIFF WBC: CPT | Performed by: INTERNAL MEDICINE

## 2024-02-18 PROCEDURE — 85018 HEMOGLOBIN: CPT | Performed by: INTERNAL MEDICINE

## 2024-02-18 PROCEDURE — 87205 SMEAR GRAM STAIN: CPT

## 2024-02-18 RX ORDER — PANTOPRAZOLE SODIUM 40 MG/1
40 TABLET, DELAYED RELEASE ORAL
Status: DISCONTINUED | OUTPATIENT
Start: 2024-02-18 | End: 2024-02-25 | Stop reason: HOSPADM

## 2024-02-18 RX ADMIN — IPRATROPIUM BROMIDE AND ALBUTEROL SULFATE 3 ML: .5; 3 SOLUTION RESPIRATORY (INHALATION) at 11:47

## 2024-02-18 RX ADMIN — TAMSULOSIN HYDROCHLORIDE 0.4 MG: 0.4 CAPSULE ORAL at 09:21

## 2024-02-18 RX ADMIN — CARVEDILOL 25 MG: 25 TABLET, FILM COATED ORAL at 09:21

## 2024-02-18 RX ADMIN — HYDROXYZINE HYDROCHLORIDE 25 MG: 25 TABLET, FILM COATED ORAL at 22:12

## 2024-02-18 RX ADMIN — Medication 10 ML: at 21:05

## 2024-02-18 RX ADMIN — CEFEPIME 2000 MG: 2 INJECTION, POWDER, FOR SOLUTION INTRAVENOUS at 16:32

## 2024-02-18 RX ADMIN — IPRATROPIUM BROMIDE AND ALBUTEROL SULFATE 3 ML: .5; 3 SOLUTION RESPIRATORY (INHALATION) at 06:27

## 2024-02-18 RX ADMIN — Medication 250 MG: at 21:04

## 2024-02-18 RX ADMIN — CEFEPIME 2000 MG: 2 INJECTION, POWDER, FOR SOLUTION INTRAVENOUS at 09:20

## 2024-02-18 RX ADMIN — ARFORMOTEROL TARTRATE 15 MCG: 15 SOLUTION RESPIRATORY (INHALATION) at 06:27

## 2024-02-18 RX ADMIN — GUAIFENESIN 600 MG: 600 TABLET ORAL at 09:21

## 2024-02-18 RX ADMIN — FOLIC ACID 1 MG: 1 TABLET ORAL at 09:21

## 2024-02-18 RX ADMIN — Medication 400 MG: at 09:21

## 2024-02-18 RX ADMIN — Medication 250 MG: at 09:21

## 2024-02-18 RX ADMIN — BUDESONIDE 0.5 MG: 0.5 INHALANT ORAL at 06:27

## 2024-02-18 RX ADMIN — IPRATROPIUM BROMIDE AND ALBUTEROL SULFATE 3 ML: .5; 3 SOLUTION RESPIRATORY (INHALATION) at 00:11

## 2024-02-18 RX ADMIN — CARVEDILOL 25 MG: 25 TABLET, FILM COATED ORAL at 21:04

## 2024-02-18 RX ADMIN — HYDROXYZINE HYDROCHLORIDE 25 MG: 25 TABLET, FILM COATED ORAL at 03:07

## 2024-02-18 RX ADMIN — INSULIN DETEMIR 30 UNITS: 100 INJECTION, SOLUTION SUBCUTANEOUS at 09:22

## 2024-02-18 RX ADMIN — DOCUSATE SODIUM 50MG AND SENNOSIDES 8.6MG 2 TABLET: 8.6; 5 TABLET, FILM COATED ORAL at 09:21

## 2024-02-18 RX ADMIN — SODIUM HYPOCHLORITE: 1.25 SOLUTION TOPICAL at 09:22

## 2024-02-18 RX ADMIN — BUDESONIDE 0.5 MG: 0.5 INHALANT ORAL at 18:40

## 2024-02-18 RX ADMIN — FINASTERIDE 5 MG: 5 TABLET, FILM COATED ORAL at 09:21

## 2024-02-18 RX ADMIN — BUMETANIDE 2 MG: 0.25 INJECTION INTRAMUSCULAR; INTRAVENOUS at 09:21

## 2024-02-18 RX ADMIN — ACETAMINOPHEN 650 MG: 325 TABLET ORAL at 04:43

## 2024-02-18 RX ADMIN — Medication 10 ML: at 09:21

## 2024-02-18 RX ADMIN — IPRATROPIUM BROMIDE AND ALBUTEROL SULFATE 3 ML: .5; 3 SOLUTION RESPIRATORY (INHALATION) at 18:40

## 2024-02-18 RX ADMIN — Medication 400 MG: at 21:04

## 2024-02-18 RX ADMIN — APIXABAN 5 MG: 5 TABLET, FILM COATED ORAL at 21:04

## 2024-02-18 RX ADMIN — GUAIFENESIN 600 MG: 600 TABLET ORAL at 21:03

## 2024-02-18 RX ADMIN — MONTELUKAST 10 MG: 10 TABLET, FILM COATED ORAL at 21:03

## 2024-02-18 RX ADMIN — PANTOPRAZOLE SODIUM 40 MG: 40 TABLET, DELAYED RELEASE ORAL at 09:21

## 2024-02-18 RX ADMIN — DULOXETINE HYDROCHLORIDE 30 MG: 30 CAPSULE, DELAYED RELEASE ORAL at 09:20

## 2024-02-18 RX ADMIN — APIXABAN 5 MG: 5 TABLET, FILM COATED ORAL at 09:21

## 2024-02-18 RX ADMIN — ARFORMOTEROL TARTRATE 15 MCG: 15 SOLUTION RESPIRATORY (INHALATION) at 18:40

## 2024-02-18 NOTE — THERAPY EVALUATION
RT EQUIPMENT DEVICE RELATED - SKIN ASSESSMENT    RT Medical Equipment/Device:     NIV Mask:  Under-the-nose   size:       Skin Assessment:      Cheek:  Intact  Chin:  Intact  Nares:  Intact  Neck:  Intact    Device Skin Pressure Protection:  Positioning supports utilized    Nurse Notification:  Kaylin Mullins, RRT

## 2024-02-18 NOTE — PROGRESS NOTES
[facility]   Hospitalist Progress Note    Date of admission: 2/16/2024  Patient Name: Preston Wallis  1965  Date: 2/18/2024      Subjective     No chief complaint on file.    Interval Followup: Less short of air today.  No fevers chills.  Had large bowel movement that was hard and slightly dark in color.    Objective     Vitals:   Temp:  [97.3 °F (36.3 °C)-97.9 °F (36.6 °C)] 97.7 °F (36.5 °C)  Heart Rate:  [69-81] 75  Resp:  [18] 18  BP: (116-171)/(42-70) 171/65  Flow (L/min):  [2] 2    Physical Exam  Chronic ill-appearing morbidly obese in chair at bedside more awake today  Bilateral rhonchi with decreasing wheezing conversational dyspnea on nasal cannula  RRR  Abdomen soft obese  Left lower extremity wound dressed no discharge or drainage    Result Review:  Vital signs, labs and recent relevant imaging reviewed.        acetaminophen    albuterol    aluminum-magnesium hydroxide-simethicone    benzonatate    senna-docusate sodium **AND** polyethylene glycol **AND** bisacodyl **AND** bisacodyl    dextrose    dextrose    Diclofenac Sodium    gabapentin    glucagon (human recombinant)    guaiFENesin-dextromethorphan    hydrALAZINE    hydrOXYzine    Lidocaine    melatonin    nicotine    ondansetron    Pharmacy Consult - Pharmacy to dose    Pharmacy to dose vancomycin    prochlorperazine    sodium chloride    sodium chloride    sodium chloride    apixaban, 5 mg, Oral, Q12H  arformoterol, 15 mcg, Nebulization, BID - RT  budesonide, 0.5 mg, Nebulization, BID - RT  bumetanide, 2 mg, Intravenous, Daily  carvedilol, 25 mg, Oral, Q12H  cefepime, 2,000 mg, Intravenous, Q8H  collagenase, 1 Application, Topical, Q24H  DULoxetine, 30 mg, Oral, Daily  [Held by provider] ferrous sulfate, 325 mg, Oral, Daily With Breakfast  finasteride, 5 mg, Oral, Daily  folic acid, 1 mg, Oral, Daily  guaiFENesin, 600 mg, Oral, Q12H  insulin detemir, 30 Units, Subcutaneous, BID  insulin lispro, 4-24 Units, Subcutaneous, 4x Daily AC & at  Bedtime  ipratropium-albuterol, 3 mL, Nebulization, 4x Daily - RT  [Held by provider] losartan, 25 mg, Oral, Q24H  magnesium oxide, 400 mg, Oral, BID  montelukast, 10 mg, Oral, Nightly  [Held by provider] NIFEdipine XL, 30 mg, Oral, QAM  pantoprazole, 40 mg, Oral, Q AM  psyllium, 1 packet, Oral, Daily  saccharomyces boulardii, 250 mg, Oral, BID  senna-docusate sodium, 2 tablet, Oral, BID  sodium chloride, 10 mL, Intravenous, Q12H  Sodium Hypochlorite, , Topical, Q12H  tamsulosin, 0.4 mg, Oral, Daily        CT Head Without Contrast    Result Date: 2/17/2024   Motion degraded exam with no definite acute abnormalities.     MIAH GREEN MD       Electronically Signed and Approved By: MIAH GREEN MD on 2/17/2024 at 1:21             XR Chest 1 View    Result Date: 2/17/2024   No significant interval change.       MIAH GREEN MD       Electronically Signed and Approved By: MIAH GREEN MD on 2/17/2024 at 1:01             XR Chest 1 View    Result Date: 2/16/2024    1. There is borderline heart size with pulmonary vascular congestion 2. Chronic bilateral airspace disease       WHIT HARDIN MD       Electronically Signed and Approved By: WHIT HARDIN MD on 2/16/2024 at 16:52             XR chest AP portable    Result Date: 2/16/2024  No significant interval change. Images personally reviewed, interpreted and dictated by SHANT Velasquez.          MRI foot left with and without contrast    Result Date: 2/14/2024  1. Soft tissue ulceration along the plantar/lateral aspect of the forefoot adjacent to the fifth MTP joint with adjacent cellulitis. No abnormal fluid collections to suggest abscess. 2. There is bone marrow edema in the fifth digit proximal phalanx and distal fifth metatarsal and a trace amount of joint effusion. Findings are concerning for septic arthritis with reactive marrow edema. No definite osseous destructive changes/abnormal bone marrow signal changes to suggest osteomyelitis. 3. Mild  multijoint degenerative changes of the remaining MTP joints with a trace amount of joint effusion, and without abnormal MRI signal changes or enhancement, likely representing degenerative effusions. Images personally reviewed, interpreted and dictated by SHANT Velasquez.    XR Foot 3+ View Left    Result Date: 2/11/2024  No acute osseous abnormality. Images reviewed, interpreted, and dictated by Dr. Abad Billingsley. Transcribed by Joseph Denton PA-C.    X-ray chest AP only    Result Date: 2/11/2024  Right suprahilar pneumonia superimposed on chronic change. Images reviewed, interpreted, and dictated by Dr. Abad Billingsley. Transcribed by Joseph Denton PA-C.    CT chest for pulmonary embolus    Result Date: 2/11/2024  1. No evidence of pulmonary embolism. 2. New bilateral extensive multifocal pneumonia, right greater than left. Pneumonia is predominantly peripheral to the areas of chronic cystic bronchiectasis.    This study was performed using dose reduction techniques to achieve radiation exposure as low as reasonably achievable (ALARA)      2/2/24 echo from OSH  Normal LV wall motion with estimated LV ejection fraction 50 to 55%   Mild concentric LVH   Normal valvular structure and function       Assessment / Plan     Summary: 58-year-old male morbidly obese COPD on 2 L baseline, CKD 3/4, MILADY not adherent to BiPAP, chronic nursing home resident who presented to the outside hospital on 2/11 with pneumonia.  Patient was recently admitted to [facility] with tibial plateau fracture.     Assessment/Plan (clinically significant if listed here)  Sepsis secondary to Pneumonia, bacterial  Acute hypoxemic hypercapnic respiratory failure  MILADY noncompliant with home bipap  Acute metabolic encephalopathy in setting of above  Diabetic foot Infection/SSTI, unspecified organism   Diastolic CHF with exacerbation 2/2024 from outside hospital EF 50%  CKD3/4 baseline creatinine ~1.4-1.7  Paroxysmal Afib on  apixaban  CAD  COPD with exacerbation, baseline 2 L oxygen  Hx of DVT  IDDM2  Iron deficiency anemia   BPH  Hx of neurogenic bladder with straight cath at home  Chronic NH resident  Morbid Obesity    White count 12.5 slight decrease, continue to monitor infectious status  Continue IV vancomycin and cefepime for pneumonia and foot infection,  narrow further as able, monitor cultures, Florastor  Patient recently underwent treatment for MRSA pneumonia 12/2023  Continue brov/pulm, scheduled nebs respiratory hygiene  Monitor sedation, continuous pulse ox, if pt has increasing lethargy need to be placed back on BiPAP, suspect episodes of increased confusion related to hypercarbia, doing better with continued adherence to bipap regimen  Appreciate podiatry assistance, no additional surgical invention need further exam  Discussed with pulmonology appreciate assistance, monitoring BiPAP and continue respiratory hygiene management as above  Hb decreased to 7, no bleeding noted, repeat hnh type and screen later today, has NAEL and AOCD, monitor, change famotidine to ppi for now.  Hemoglobin is running low in the past, does look like he got a transfusion back in December as well  Continue Coreg holding other blood pressure medications monitor  Have decreased duloxetine to once daily question if this is contributing sedation, hold gabapentin, use lidocaine prn if needed, if resuming gabapentin will use lower dose   Continue insulin monitor  Added Flomax continue finasteride Doyle for now voiding trial 1-2 days  ?review of prior notes may have had history of neurogenic bladder with straight cathing at home.    PT/OT  Check a.m. CBC, BMP, magnesium, phosphorus  Continue hospitalization at current level of care, telemetry, still high risk for decompensation given his continuous BiPAP need and only recently able to transition off with high risk study will likely need this again.  Will minimally needed for sleep and naps        DVT  prophylaxis:  Medical DVT prophylaxis orders are present.        Code Status (Patient has no pulse and is not breathing): CPR (Attempt to Resuscitate)  Medical Interventions (Patient has pulse or is breathing): Full Support        CBC          2/16/2024    17:07 2/17/2024    01:17 2/18/2024    06:00   CBC   WBC 14.84  13.16  12.53    RBC 3.08  3.02  2.68    Hemoglobin 8.3  8.1  7.1    Hematocrit 28.1  26.7  24.1    MCV 91.2  88.4  89.9    MCH 26.9  26.8  26.5    MCHC 29.5  30.3  29.5    RDW 14.5  14.4  14.7    Platelets 491  455  454        CMP          2/16/2024    16:45 2/16/2024    17:07 2/17/2024    00:44 2/17/2024    01:17 2/18/2024    06:00   CMP   Glucose 277  281  249  268  130    BUN  29   31  30    Creatinine  1.78   1.74  1.76    EGFR  43.7   44.9  44.3    Sodium 135.8  138  138.2  139  138    Potassium  5.2   4.3  4.4    Chloride  102   102  102    Calcium  8.8   8.8  8.5    Total Protein  8.3       Albumin  2.8       Globulin  5.5       Total Bilirubin  0.2       Alkaline Phosphatase  214       AST (SGOT)  14       ALT (SGPT)  13       Albumin/Globulin Ratio  0.5       BUN/Creatinine Ratio  16.3   17.8  17.0    Anion Gap  8.2   11.7  8.1

## 2024-02-18 NOTE — PROGRESS NOTES
Respiratory Therapist Broncho-Pulmonary Hygiene Progress Note      Patient Name:  Preston Wallis  YOB: 1965    Preston Wallis meets the qualification for Level 2 of the Bronco-Pulmonary Hygiene Protocol. This was based on my daily patient assessment and includes review of chest x-ray results, cough ability and quality, oxygenation, secretions or risk for secretion development and patient mobility.     Broncho-Pulmonary Hygiene Assessment:    Level of Movement: Actively changing positions-requires assistance  Disoriented/Follows Commands    Breath Sounds: Diminished and/or coarse rhonchi    Cough: Strong, effective and/or frequent    Chest X-Ray: Possible signs of consolidation and/or atelectasis or clear.     Sputum Productions: Able to produce small to moderate amount, of moderately thick secretions    History and Physical: Chronic condition    SpO2 to Oxygen Need: greater than 92% on room air or  less than 3L nasal canula    Current SpO2 is: 97 on 2L    Based on this information, I have completed the following interventions: PAP with [employer]      Electronically signed by Thu Mullins RRT, 02/18/24, 9:20 AM EST.

## 2024-02-18 NOTE — PROGRESS NOTES
"[facility] Clinical Pharmacy Services: Vancomycin Monitoring Note    Preston Wallis is a 58 y.o. male who is on day 8  of  vancomycin (received 6 days at outside hospital)    Previous Vancomycin Dose:   500 mg IV   @1138 ([place]) yesterday  Imaging Reviewed?: Yes  Updated Cultures and Sensitivities:    Resp Culture- Rare gram neg bacilli, Many WBC, Rare epithelial cells   Urine- Legionella, S Pneumo- Negative antigen    Blood- NGTD    Vitals/Labs  Ht: 175.3 cm (69\"); Wt: 126 kg (276 lb 10.8 oz)   Temp (24hrs), Av.7 °F (36.5 °C), Min:97.3 °F (36.3 °C), Max:97.9 °F (36.6 °C)   Estimated Creatinine Clearance: 60.1 mL/min (A) (by C-G formula based on SCr of 1.76 mg/dL (H)).     Results from last 7 days   Lab Units 24  1304 24  0600 24  0117 24  1707 24  1029 24  1440 24  1424   VANCOMYCIN RM mcg/mL 21.93 23.63  24.00 20.80  --   --   --   --    VANCOMYCIN TR ug/mL  --   --   --   --  17.5 26.1* 0.9*   CREATININE mg/dL  --  1.76* 1.74* 1.78*  --   --   --    WBC 10*3/mm3  --  12.53* 13.16* 14.84*  --   --   --      Assessment/Plan  Vancomycin level this AM reported as 23.63.  Level was later reported as 21.93 ~7 hours later.  No additional vancomycin will be provided at this time.     Next Vanc Random ordered for am.  We will continue to monitor patient changes and renal function     Thank you for involving pharmacy in this patient's care. Please contact pharmacy with any questions or concerns.    Juan Car  Clinical Pharmacist   "

## 2024-02-18 NOTE — PROGRESS NOTES
[facility] - PODIATRY    Today's Date: 02/18/24    Patient Name: Preston Wallis  MRN: [id number]  CSN: [id number]  PCP: Kimmy Riley MD  Referring Provider: Mason Sandoval,*  Attending Provider: Shane Abarca MD  Length of Stay: 2    SUBJECTIVE   Chief Complaint: Left foot ulcerations     HPI:     Preston Wallis is a 58 y.o. male      PMH:  COPD with bronchiectasis on 2 L home oxygen, CKD with baseline creatinine between 1.4-1.7, DVT, obstructive sleep apnea, noncompliant with BiPAP, type 2 diabetes, nursing home resident and morbid obesity with BMI 41 who initially presented to [facility] on 2/11 with concern for pneumonia.  Patient was recently admitted to [place] with tibial plateau fracture.  Chest x-ray showed pneumonia and he had a leukocytosis.  He was started on broad-spectrum vancomycin and cefepime and admitted for further care.  In the process of managing his pneumonia, was noted that he had a area on his fifth metatarsal that was concerning for osteomyelitis and MRI actually showed septic arthritis.  Podiatry was consulted and will see the patient for septic arthritis.  Patient is currently on cefepime and vancomycin.  CBC shows a white count of 20,000 and his creatinine is 2.0.  He was planned to be transferred to [facility] on 2/15 but he had an acute status change with hypercapnia and pH 7.23, CO2 71.  He was placed on BiPAP with improvement of his mental status.  He again refused to wear BiPAP overnight however he is back to his baseline mentation this morning.  ABG this morning showed pH 7.26, CO2 66.  However, he has on PCU, currently on 3 L nasal cannula, blood pressure 179/80, normal heart rate.  He is transferred to [facility] on 2/16.  Recommend consultation to Dr. Nelson and pulmonology upon arrival.  Plan to continue vancomycin and cefepime.     Of note, on 1/31 the patient had 2/2 blood cultures positive for MRSA.  Repeat blood cultures were negative, it was presumed to be  from cellulitis of his foot and he was treated with Zyvox.  Repeat blood cultures this admission have been negative for MRSA.  He has never had a KARINA as his blood cultures cleared quickly.         Patient states he is feeling better this morning.     Review of Systems              All systems were reviewed and negative except for: Left foot ulcerations    Past Medical History:   Diagnosis Date    Age-related cognitive decline     Allergic contact dermatitis     Allergies     Anemia     Bronchiectasis with acute lower respiratory infection     Charcot foot due to diabetes mellitus 9/10/2013    Chronic diastolic (congestive) heart failure     Chronic kidney disease     Chronic respiratory failure with hypoxia     Closed supracondylar fracture of femur 1/12/2022    COPD (chronic obstructive pulmonary disease)     Deep vein thrombosis (DVT) of lower extremity associated with air travel 1/13/2023    Dependence on supplemental oxygen     Eczema     Erectile dysfunction     due to organic reasons    Essential (primary) hypertension     Fracture     closed fracture of other tarsal and metatarsal bones    Fracture of proximal humerus 1/13/2023    GERD without esophagitis     High risk medication use     Hypercholesteremia     Hypomagnesemia     Infected stasis ulcer of left lower extremity 1/13/2023    Insomnia     Low back pain     Major depressive disorder     Morbid (severe) obesity due to excess calories     MRSA pneumonia     Muscle weakness     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 without evidence of necrosis     Obstructive sleep apnea (adult) (pediatric)     Other forms of dyspnea     Other long term (current) drug therapy     Other specified noninfective gastroenteritis and colitis     Other spondylosis, lumbar region     Pain in both knees     Paroxysmal atrial fibrillation     Peripheral neuropathy     attributed to type 2 diabetes    Pneumonia, unspecified organism      Polyneuropathy     Rash and other nonspecific skin eruption     Syncope and collapse     Tachycardia     Tinnitus 2023    Type 1 diabetes mellitus with diabetic chronic kidney disease     Type 2 diabetes mellitus     Unspecified fall, initial encounter     Urinary retention      Past Surgical History:   Procedure Laterality Date    CHOLECYSTECTOMY      CYSTOSCOPY      FEMUR SURGERY Left     Shravan placed    KNEE SURGERY Left     OTHER SURGICAL HISTORY Left     venous port    TIBIAL PLATEAU OPEN REDUCTION INTERNAL FIXATION Left 2023    Procedure: TIBIAL PLATEAU OPEN REDUCTION INTERNAL FIXATION;  Surgeon: Hugo Kline MD;  Location: [facility];  Service: Orthopedics;  Laterality: Left;    TONSILLECTOMY AND ADENOIDECTOMY       Family History   Problem Relation Age of Onset    Coronary artery disease Mother     Hypertension Mother     Diabetes type II Mother     Asthma Father     Diabetes type II Sister     Cancer Sister      Social History     Socioeconomic History    Marital status:    Tobacco Use    Smoking status: Former     Packs/day: 1.00     Years: 12.00     Additional pack years: 0.00     Total pack years: 12.00     Types: Cigarettes     Start date:      Quit date:      Years since quittin.1    Smokeless tobacco: Never   Vaping Use    Vaping Use: Never used   Substance and Sexual Activity    Alcohol use: Not Currently    Drug use: Never    Sexual activity: Defer     Allergies   Allergen Reactions    Benadryl [Diphenhydramine] Itching    Proventil [Albuterol] Other (See Comments)     Mouth sores       Current Facility-Administered Medications   Medication Dose Route Frequency Provider Last Rate Last Admin    acetaminophen (TYLENOL) tablet 650 mg  650 mg Oral Q4H PRN Shane Abarca MD   650 mg at 24 0443    albuterol (PROVENTIL) nebulizer solution 0.083% 2.5 mg/3mL  2.5 mg Nebulization Q4H PRN Shane Abarca MD        aluminum-magnesium hydroxide-simethicone  (MAALOX MAX) 400-400-40 MG/5ML suspension 15 mL  15 mL Oral Q6H PRN Shane Abarca MD        apixaban (ELIQUIS) tablet 5 mg  5 mg Oral Q12H Shane Abarca MD   5 mg at 02/17/24 2146    arformoterol (BROVANA) nebulizer solution 15 mcg  15 mcg Nebulization BID - RT Shane Abarca MD   15 mcg at 02/18/24 0627    benzonatate (TESSALON) capsule 100 mg  100 mg Oral TID PRN Shane Abarca MD        sennosides-docusate (PERICOLACE) 8.6-50 MG per tablet 2 tablet  2 tablet Oral BID Shane Abarca MD   2 tablet at 02/17/24 0924    And    polyethylene glycol (MIRALAX) packet 17 g  17 g Oral Daily PRN Shane Abarca MD        And    bisacodyl (DULCOLAX) EC tablet 5 mg  5 mg Oral Daily PRN Shane Abarca MD        And    bisacodyl (DULCOLAX) suppository 10 mg  10 mg Rectal Daily PRN Shane Abarca MD        budesonide (PULMICORT) nebulizer solution 0.5 mg  0.5 mg Nebulization BID - RT Shane Abarca MD   0.5 mg at 02/18/24 0627    bumetanide (BUMEX) injection 2 mg  2 mg Intravenous Daily Shane Abarca MD   2 mg at 02/17/24 1011    carvedilol (COREG) tablet 25 mg  25 mg Oral Q12H Shane Abarca MD   25 mg at 02/17/24 2146    cefepime (MAXIPIME) 2000 mg/100 mL 0.9% NS (mbp)  2,000 mg Intravenous Q8H Shane Abarca MD   2,000 mg at 02/17/24 2309    dextrose (D50W) (25 g/50 mL) IV injection 25 g  25 g Intravenous Q15 Min PRN Shane Abarca MD        dextrose (GLUTOSE) oral gel 15 g  15 g Oral Q15 Min PRN Shane Abarca MD        Diclofenac Sodium (VOLTAREN) 1 % gel 2 g  2 g Topical 4x Daily PRN Shane Abarca MD        DULoxetine (CYMBALTA) DR capsule 30 mg  30 mg Oral Daily Shane Abarca MD        famotidine (PEPCID) tablet 40 mg  40 mg Oral QAM Shane Abarca MD   40 mg at 02/17/24 0801    ferrous sulfate tablet 325 mg  325 mg Oral Daily With Breakfast Shane Abarca MD   325 mg at 02/17/24 0740    finasteride (PROSCAR) tablet 5 mg  5 mg Oral Daily Shane Abarca MD   5 mg at 02/17/24 0924    folic acid  (FOLVITE) tablet 1 mg  1 mg Oral Daily Shane Abarca MD   1 mg at 02/17/24 0924    gabapentin (NEURONTIN) capsule 100 mg  100 mg Oral TID PRN Shane Abarca MD   100 mg at 02/17/24 2309    glucagon (GLUCAGEN) injection 1 mg  1 mg Intramuscular Q15 Min PRN Shane Abarca MD        guaiFENesin (MUCINEX) 12 hr tablet 600 mg  600 mg Oral Q12H Shane Abarca MD   600 mg at 02/17/24 2146    guaiFENesin-dextromethorphan (ROBITUSSIN DM) 100-10 MG/5ML syrup 5 mL  5 mL Oral Q4H PRN Shane Abarca MD        hydrALAZINE (APRESOLINE) injection 10 mg  10 mg Intravenous Q6H PRN Charles So MD   10 mg at 02/16/24 2319    hydrOXYzine (ATARAX) tablet 25 mg  25 mg Oral TID PRN Shane Abarca MD   25 mg at 02/18/24 0307    insulin detemir (LEVEMIR) injection 30 Units  30 Units Subcutaneous BID Shane Abarca MD   30 Units at 02/17/24 2157    Insulin Lispro (humaLOG) injection 4-24 Units  4-24 Units Subcutaneous 4x Daily AC & at Bedtime Shane Abarca MD   4 Units at 02/17/24 2158    ipratropium-albuterol (DUO-NEB) nebulizer solution 3 mL  3 mL Nebulization 4x Daily - RT Shane Abarca MD   3 mL at 02/18/24 0627    Lidocaine 4 % 1 patch  1 patch Transdermal Daily PRN Shane Abarca MD        [Held by provider] losartan (COZAAR) tablet 25 mg  25 mg Oral Q24H Shane Abarca MD        magnesium oxide (MAG-OX) tablet 400 mg  400 mg Oral BID Shane Abarca MD   400 mg at 02/17/24 2146    melatonin tablet 5 mg  5 mg Oral Nightly PRN Shane Abarca MD        montelukast (SINGULAIR) tablet 10 mg  10 mg Oral Nightly Shane Abarca MD   10 mg at 02/17/24 2146    nicotine (NICODERM CQ) 21 MG/24HR patch 1 patch  1 patch Transdermal Daily PRN Shane Abarca MD        [Held by provider] NIFEdipine XL (PROCARDIA XL) 24 hr tablet 30 mg  30 mg Oral QAM Shane Abarca MD        ondansetron (ZOFRAN) injection 4 mg  4 mg Intravenous Q6H PRN Shane Abarca MD        Pharmacy to Dose Cefepime   Does not apply Continuous PRN Rima,  MD Shane        Pharmacy to dose vancomycin   Does not apply Continuous PRN Shane Abarca MD        prochlorperazine (COMPAZINE) injection 5 mg  5 mg Intravenous Q6H PRN Shane Abarca MD        saccharomyces boulardii (FLORASTOR) capsule 250 mg  250 mg Oral BID Shane Abarca MD   250 mg at 02/17/24 2146    sodium chloride 0.9 % flush 10 mL  10 mL Intravenous Q12H Shane Abarca MD   10 mL at 02/17/24 2147    sodium chloride 0.9 % flush 10 mL  10 mL Intravenous PRN Shane Abarca MD        sodium chloride 0.9 % infusion 40 mL  40 mL Intravenous PRN Shane Abarca MD        sodium chloride nasal spray 2 spray  2 spray Each Nare PRN Shane Abarca MD        Sodium Hypochlorite (DAKIN'S) 0.125 % topical solution 0.125% (quarter strength)   Topical Q12H Donald Nelson DPM   Given at 02/17/24 2146    tamsulosin (FLOMAX) 24 hr capsule 0.4 mg  0.4 mg Oral Daily Shane Abarca MD   0.4 mg at 02/17/24 0924     Review of Systems    OBJECTIVE     Vitals:    02/18/24 0745   BP: 157/70   Pulse: 71   Resp: 18   Temp: 97.7 °F (36.5 °C)   SpO2: 97%       PHYSICAL EXAM    GEN:   A&Ox3, NAD. Pt presents in hospital bed.     Physical Exam                           GEN:   A&Ox3, Patient seen at bedside in no apparent distress.     Physical Exam  Constitutional:       General: He is not in acute distress.     Appearance: He is obese. He is ill-appearing and chronically ill-appearing.   Cardiovascular:      Pulses:           Dorsalis pedis pulses are 1+ on the right side and 1+ on the left side.        Posterior tibial pulses are 1+ on the right side and 1+ on the left side.   Feet:      Right foot:      Protective Sensation: 10 sites tested.        Skin integrity: Warmth present.      Left foot:      Protective Sensation: 10 sites tested.        Skin integrity: Ulcer and warmth present.            Foot/Ankle Exam     GENERAL  Appearance:  obese, chronically ill and appears ill  Orientation:  AAOx3  Affect:   appropriate     VASCULAR      Right Foot Vascularity   Dorsalis pedis:  1+  Posterior tibial:  1+  Skin temperature:  warm  Edema grading:  None  CFT:  < 3 seconds  Pedal hair growth:  Absent  Varicosities:  mild varicosities      Left Foot Vascularity   Dorsalis pedis:  1+  Posterior tibial:  1+  Skin temperature:  warm  Edema grading:  None  CFT:  < 3 seconds  Pedal hair growth:  Absent  Varicosities:  none     NEUROLOGIC      Right Foot Neurologic   Light touch sensation: absent  Vibratory sensation: absent  Hot/Cold sensation: absent  Protective Sensation using Gilbert-Deshaun Monofilament:   Sites tested: 10      Left Foot Neurologic   Light touch sensation: absent  Vibratory sensation: absent  Hot/Cold sensation:  absent  Protective Sensation using Gilbert-Deshaun Monofilament:   Sites tested: 10     MUSCLE STRENGTH      Right Foot Muscle Strength   Foot dorsiflexion:  3  Foot plantar flexion:  3  Foot inversion:  3  Foot eversion:  3      Left Foot Muscle Strength   Foot dorsiflexion:  3  Foot plantar flexion:  3  Foot inversion:  3  Foot eversion:  3     RANGE OF MOTION      Right Foot Range of Motion   Foot and ankle ROM within normal limits        Left Foot Range of Motion   Foot and ankle ROM within normal limits       DERMATOLOGIC       Right Foot Dermatologic   Skin  Right foot skin is intact.       Left Foot Dermatologic   Skin  Positive for ulcer.      Left foot additional comments: Pressure ulceration is seen on left heel.  No drainage present.  Improving.     Ulceration plantar left fifth metatarsal head with soft eschar.  Surrounding tissue is improved compared to previous exam.  Resolving edema and erythema.  No lymphangitis.  No fluctuance.              LABORATORY/CULTURE RESULTS:  Results from last 7 days   Lab Units 02/18/24  0600 02/17/24  0117 02/16/24  1707   WBC 10*3/mm3 12.53* 13.16* 14.84*   HEMOGLOBIN g/dL 7.1* 8.1* 8.3*   HEMATOCRIT % 24.1* 26.7* 28.1*   PLATELETS 10*3/mm3 454* 455*  491*     Results from last 7 days   Lab Units 02/18/24  0600 02/17/24  0117 02/17/24  0044 02/16/24  1707   SODIUM mmol/L 138 139  --  138   SODIUM, ARTERIAL mmol/L  --   --  138.2  --    POTASSIUM mmol/L 4.4 4.3  --  5.2   CHLORIDE mmol/L 102 102  --  102   CO2 mmol/L 27.9 25.3  --  27.8   BUN mg/dL 30* 31*  --  29*   CREATININE mg/dL 1.76* 1.74*  --  1.78*   CALCIUM mg/dL 8.5* 8.8  --  8.8   BILIRUBIN mg/dL  --   --   --  0.2   ALK PHOS U/L  --   --   --  214*   ALT (SGPT) U/L  --   --   --  13   AST (SGOT) U/L  --   --   --  14   GLUCOSE mg/dL 130* 268*  --  281*   GLUCOSE, ARTERIAL mg/dL  --   --  249*  --      Results from last 7 days   Lab Units 02/16/24  1707   INR  1.21*     Microbiology Results (last 10 days)       Procedure Component Value - Date/Time    Respiratory Culture - Sputum, Cough [[id number]] Collected: 02/18/24 0311    Lab Status: Preliminary result Specimen: Sputum from Cough Updated: 02/18/24 0510     Gram Stain Rare (1+) Gram negative bacilli      Many (4+) WBCs seen      Rare (1+) Epithelial cells seen    S. Pneumo Ag Urine or CSF - Urine, Urine, Clean Catch [[id number]]  (Normal) Collected: 02/17/24 1806    Lab Status: Final result Specimen: Urine, Clean Catch Updated: 02/17/24 1911     Strep Pneumo Ag Negative    Legionella Antigen, Urine - Urine, Urine, Clean Catch [[id number]]  (Normal) Collected: 02/17/24 1806    Lab Status: Final result Specimen: Urine, Clean Catch Updated: 02/17/24 1911     LEGIONELLA ANTIGEN, URINE Negative    Respiratory Panel PCR w/COVID-19(SARS-CoV-2) OSMIN/ROBERTA/OCHOA/PAD/COR/LUIS In-House, NP Swab in UTM/VTM, 2 HR TAT - Swab, Nasopharynx [[id number]]  (Normal) Collected: 02/16/24 1849    Lab Status: Final result Specimen: Swab from Nasopharynx Updated: 02/16/24 1949     ADENOVIRUS, PCR Not Detected     Coronavirus 229E Not Detected     Coronavirus HKU1 Not Detected     Coronavirus NL63 Not Detected     Coronavirus OC43 Not Detected     COVID19 Not Detected     Human  Metapneumovirus Not Detected     Human Rhinovirus/Enterovirus Not Detected     Influenza A PCR Not Detected     Influenza B PCR Not Detected     Parainfluenza Virus 1 Not Detected     Parainfluenza Virus 2 Not Detected     Parainfluenza Virus 3 Not Detected     Parainfluenza Virus 4 Not Detected     RSV, PCR Not Detected     Bordetella pertussis pcr Not Detected     Bordetella parapertussis PCR Not Detected     Chlamydophila pneumoniae PCR Not Detected     Mycoplasma pneumo by PCR Not Detected    Narrative:      In the setting of a positive respiratory panel with a viral infection PLUS a negative procalcitonin without other underlying concern for bacterial infection, consider observing off antibiotics or discontinuation of antibiotics and continue supportive care. If the respiratory panel is positive for atypical bacterial infection (Bordetella pertussis, Chlamydophila pneumoniae, or Mycoplasma pneumoniae), consider antibiotic de-escalation to target atypical bacterial infection.    Blood Culture - Blood, Hand, Left [[id number]]  (Normal) Collected: 02/16/24 1730    Lab Status: Preliminary result Specimen: Blood from Hand, Left Updated: 02/17/24 1745     Blood Culture No growth at 24 hours    Blood Culture - Blood, Arm, Left [[id number]]  (Normal) Collected: 02/16/24 1707    Lab Status: Preliminary result Specimen: Blood from Arm, Left Updated: 02/17/24 1732     Blood Culture No growth at 24 hours             ASSESSMENT/PLAN     Active Hospital Problems:  Active Hospital Problems    Diagnosis     **Septic joint     Septic arthritis     Skin ulcer of left heel, limited to breakdown of skin     Ulcer of left foot, limited to breakdown of skin     Left foot pain     Type 2 DM with CKD stage 3 and hypertension     Polyneuropathy        Comprehensive lower extremity examination and evaluation was performed.    Discussed findings and treatment plan including risks, benefits, and treatment options with patient in detail.  Patient agreed with treatment plan.    Orders written for Santyl to plantar left fifth metatarsal head ulceration.    Wound care nurse pending.    No surgery is indicated at this time.    Lab Frequency Next Occurrence   POC Urine Drug Screen Premier Bio-Cup Once 08/27/2023   PTH, Intact Once 08/28/2023   NM Parathyroid Scan Once 09/23/2023   CBC & Differential Once 01/09/2024   Basic Metabolic Panel Once 01/09/2024   Urinalysis With Microscopic - Urine, Clean Catch Once 01/04/2024   Protein / Creatinine Ratio, Urine - Urine, Clean Catch Once 01/04/2024   PTH, Intact Once 01/09/2024   Phosphorus Once 01/09/2024   Iron Profile Once 01/09/2024   Ferritin Once 01/09/2024       This document has been electronically signed by Donald Nelson DPM on February 18, 2024 08:44 EST

## 2024-02-18 NOTE — PLAN OF CARE
Goal Outcome Evaluation:      Patient wanted to wear his home unit tonight. Left v6o in the room in case he wanted to go back to it.

## 2024-02-18 NOTE — PROGRESS NOTES
Pulmonary / Critical Care Progress Note      Patient Name: Preston Wallis  : 1965  MRN: [id number]  Primary Care Physician:  Kimmy Riley MD  Date of admission: 2024    Subjective   Subjective   Follow-up for hypercapnic and hypoxemic respiratory failure requiring NIPPV    Over past 24 hours: Home NIPPV brought and bedside continues Brovana, Pulmicort, and DuoNebs.  Remains on Bumex 2 mg IV daily    No acute events overnight.     This morning,   On 2 L nasal cannula  Lying in bed  Reports feeling very well today  Dyspnea improved  Denies any chest pain or chest tightness  Denies cough  Fever chills  Diuresing well  -1.7 L urine output    Review of Systems  General: + Fatigue, No Fever,   HEENT: No dysphagia, No Visual Changes, no rhinorrhea  Respiratory: Unproductive cough,+Dyspnea, -phlegm, No Pleuritic Pain, - wheezing, no hemoptysis  Cardiovascular: Denies chest pain, denies palpitations,+FREITAS, No Chest Pressure  Gastrointestinal:  No Abdominal Pain, No Nausea, No Vomiting, No Diarrhea  Genitourinary:  No Dysuria, No Frequency, No Hesitancy    Objective   Objective     Vitals:   Temp:  [97.2 °F (36.2 °C)-98.2 °F (36.8 °C)] 97.3 °F (36.3 °C)  Heart Rate:  [69-87] 69  Resp:  [18-24] 18  BP: (114-144)/(42-67) 116/67  Flow (L/min):  [2-3] 2  Physical Exam   Vital Signs Reviewed   General:  WDWN, Alert, NAD.  Chronically ill-appearing male lying in bed  HEENT:  PERRL, EOMI.  OP, nares clear, no sinus tenderness  Neck:  Supple, no JVD, no thyromegaly  Chest:  good aeration, clear to auscultation bilaterally, no work of breathing noted on 2 L  CV: RRR, no MGR, pulses 2+, equal.  Abd:  Soft, NT, ND, + BS, no HSM, obese  EXT:  no clubbing, no cyanosis, no edema  Neuro:  A&Ox3, CN grossly intact, no focal deficits.  Skin: No rashes or lesions noted    Result Review    Result Review:  I have personally reviewed the results from the time of this admission to 2024 06:57 EST and agree with these  findings:  [x]  Laboratory  [x]  Microbiology  [x]  Radiology  [x]  EKG/Telemetry   [x]  Cardiology/Vascular   []  Pathology  []  Old records  []  Other:  Most notable findings include:         Lab 02/18/24  0600 02/17/24  0117 02/17/24  0044 02/16/24  1707 02/16/24  1645   WBC 12.53* 13.16*  --  14.84*  --    HEMOGLOBIN 7.1* 8.1*  --  8.3*  --    HEMATOCRIT 24.1* 26.7*  --  28.1*  --    PLATELETS 454* 455*  --  491*  --    SODIUM 138 139  --  138  --    SODIUM, ARTERIAL  --   --  138.2  --  135.8*   POTASSIUM 4.4 4.3  --  5.2  --    CHLORIDE 102 102  --  102  --    CO2 27.9 25.3  --  27.8  --    BUN 30* 31*  --  29*  --    CREATININE 1.76* 1.74*  --  1.78*  --    GLUCOSE 130* 268*  --  281*  --    GLUCOSE, ARTERIAL  --   --  249*  --  277*   CALCIUM 8.5* 8.8  --  8.8  --    PHOSPHORUS 3.0 2.3*  --  2.9  --    TOTAL PROTEIN  --   --   --  8.3  --    ALBUMIN  --   --   --  2.8*  --    GLOBULIN  --   --   --  5.5  --        Assessment & Plan   Assessment / Plan     Active Hospital Problems:  Active Hospital Problems    Diagnosis     **Septic joint     Septic arthritis     Skin ulcer of left heel, limited to breakdown of skin     Ulcer of left foot, limited to breakdown of skin     Left foot pain     Type 2 DM with CKD stage 3 and hypertension     Polyneuropathy      Impression:   Acute on chronic hypoxemic and hypercapnic respiratory failure  Acute on chronic COPD exacerbation, FEV1 of 26%  CO2 narcosis respiratory  Acidosis  Elevated BNP, 2590  ERIC on CKD  History of recurrent Pseudomonas infection with MDRO Pseudomonas  History of PE in July 2019  Tobacco abuse in remission    Plan:   -Remain on 2 L.  Continue to maintain SpO2 greater than 90%.  2 L is patient's baseline  -Encourage NIPPV wear at nighttime and as needed during naps.  Okay to wear home NIPPV  -Blood cultures negative.  Continue cefepime for septic arthritis  -Respiratory viral panel negative. Legionella, strep pneumo, and sputum culture  negative  -Continue Bumex 2 mg IV daily  -Continue to monitor renal panel and electrolytes.  Replace electrolytes as necessary  -Continue bronchopulmonary hygiene.  Encourage I-S and flutter  -Continue Brovana, Pulmicort, and DuoNebs  -Encourage mobilization.  Out of bed to chair  -On Eliquis for A-fib  -Rest of care per primary     DVT prophylaxis:  Medical DVT prophylaxis orders are present.    CODE STATUS:   Code Status (Patient has no pulse and is not breathing): CPR (Attempt to Resuscitate)  Medical Interventions (Patient has pulse or is breathing): Full Support    I personally reviewed pertinent labs, imaging and provider notes. Discussed with bedside nurse and will discuss with primary service.     Electronically signed by CON Sampson, 02/18/24, 9:37 AM EST.    Electronically signed by Martín Rivera MD, 2/18/2024, 06:57 EST.    This visit was performed by BOTH a physician and an APC. I personally evaluated and examined the patient. I performed all aspects of MDM as documented. , I have reviewed and confirmed the accuracy of the patient's history as documented in this note., and I have reexamined the patient and the results are consistent with the previously documented exam. I have updated the documentation as necessary.     Electronically signed by Martín Rivera MD, 02/18/24, 3:22 PM EST.

## 2024-02-18 NOTE — PLAN OF CARE
Goal Outcome Evaluation:              Outcome Evaluation: Patient home unit in room and patient briefly wore last night; This am patient is on 2L nasal cannula which is his home o2; patient on a continuous pulse oximeter. Tolerating well.

## 2024-02-19 LAB
ANION GAP SERPL CALCULATED.3IONS-SCNC: 9.2 MMOL/L (ref 5–15)
ARTERIAL PATENCY WRIST A: POSITIVE
BASE EXCESS BLDA CALC-SCNC: 1.5 MMOL/L (ref -2–2)
BASOPHILS # BLD AUTO: 0.14 10*3/MM3 (ref 0–0.2)
BASOPHILS NFR BLD AUTO: 1.3 % (ref 0–1.5)
BDY SITE: ABNORMAL
BUN SERPL-MCNC: 27 MG/DL (ref 6–20)
BUN/CREAT SERPL: 15.6 (ref 7–25)
CA-I BLDA-SCNC: 1.14 MMOL/L (ref 1.13–1.32)
CALCIUM SPEC-SCNC: 8.8 MG/DL (ref 8.6–10.5)
CHLORIDE BLDA-SCNC: 103 MMOL/L (ref 98–106)
CHLORIDE SERPL-SCNC: 99 MMOL/L (ref 98–107)
CO2 SERPL-SCNC: 26.8 MMOL/L (ref 22–29)
COHGB MFR BLD: 0.3 % (ref 0–1.5)
CREAT SERPL-MCNC: 1.73 MG/DL (ref 0.76–1.27)
DEPRECATED RDW RBC AUTO: 47.1 FL (ref 37–54)
EGFRCR SERPLBLD CKD-EPI 2021: 45.2 ML/MIN/1.73
EOSINOPHIL # BLD AUTO: 0.6 10*3/MM3 (ref 0–0.4)
EOSINOPHIL NFR BLD AUTO: 5.6 % (ref 0.3–6.2)
ERYTHROCYTE [DISTWIDTH] IN BLOOD BY AUTOMATED COUNT: 14.6 % (ref 12.3–15.4)
FHHB: 2.8 % (ref 0–5)
GAS FLOW AIRWAY: 2 LPM
GLUCOSE BLDA-MCNC: 153 MG/DL (ref 70–99)
GLUCOSE BLDC GLUCOMTR-MCNC: 141 MG/DL (ref 70–99)
GLUCOSE BLDC GLUCOMTR-MCNC: 151 MG/DL (ref 70–99)
GLUCOSE BLDC GLUCOMTR-MCNC: 172 MG/DL (ref 70–99)
GLUCOSE BLDC GLUCOMTR-MCNC: 194 MG/DL (ref 70–99)
GLUCOSE SERPL-MCNC: 162 MG/DL (ref 65–99)
HCO3 BLDA-SCNC: 27.4 MMOL/L (ref 22–26)
HCT VFR BLD AUTO: 25.7 % (ref 37.5–51)
HGB BLD-MCNC: 7.7 G/DL (ref 13–17.7)
HGB BLDA-MCNC: 9.1 G/DL (ref 13.8–16.4)
IMM GRANULOCYTES # BLD AUTO: 0.09 10*3/MM3 (ref 0–0.05)
IMM GRANULOCYTES NFR BLD AUTO: 0.8 % (ref 0–0.5)
INHALED O2 CONCENTRATION: ABNORMAL %
LACTATE BLDA-SCNC: 0.72 MMOL/L (ref 0.5–2)
LYMPHOCYTES # BLD AUTO: 2.16 10*3/MM3 (ref 0.7–3.1)
LYMPHOCYTES NFR BLD AUTO: 20.1 % (ref 19.6–45.3)
MAGNESIUM SERPL-MCNC: 1.7 MG/DL (ref 1.6–2.6)
MCH RBC QN AUTO: 26.6 PG (ref 26.6–33)
MCHC RBC AUTO-ENTMCNC: 30 G/DL (ref 31.5–35.7)
MCV RBC AUTO: 88.6 FL (ref 79–97)
METHGB BLD QL: 0.1 % (ref 0–1.5)
MODALITY: ABNORMAL
MONOCYTES # BLD AUTO: 1.24 10*3/MM3 (ref 0.1–0.9)
MONOCYTES NFR BLD AUTO: 11.6 % (ref 5–12)
NEUTROPHILS NFR BLD AUTO: 6.49 10*3/MM3 (ref 1.7–7)
NEUTROPHILS NFR BLD AUTO: 60.6 % (ref 42.7–76)
NOTE: ABNORMAL
NRBC BLD AUTO-RTO: 0 /100 WBC (ref 0–0.2)
OXYHGB MFR BLDV: 96.8 % (ref 94–99)
PCO2 BLDA: 49.3 MM HG (ref 35–45)
PH BLDA: 7.36 PH UNITS (ref 7.35–7.45)
PHOSPHATE SERPL-MCNC: 2.8 MG/DL (ref 2.5–4.5)
PLATELET # BLD AUTO: 487 10*3/MM3 (ref 140–450)
PMV BLD AUTO: 8.7 FL (ref 6–12)
PO2 BLD: ABNORMAL MM[HG]
PO2 BLDA: 106 MM HG (ref 80–100)
POTASSIUM BLDA-SCNC: 4.32 MMOL/L (ref 3.5–5)
POTASSIUM SERPL-SCNC: 4.6 MMOL/L (ref 3.5–5.2)
PROCALCITONIN SERPL-MCNC: 0.27 NG/ML (ref 0–0.25)
RBC # BLD AUTO: 2.9 10*6/MM3 (ref 4.14–5.8)
SAO2 % BLDCOA: 97.2 % (ref 95–99)
SODIUM BLDA-SCNC: 136.2 MMOL/L (ref 136–146)
SODIUM SERPL-SCNC: 135 MMOL/L (ref 136–145)
VANCOMYCIN SERPL-MCNC: 19.76 MCG/ML (ref 5–40)
WBC NRBC COR # BLD AUTO: 10.72 10*3/MM3 (ref 3.4–10.8)

## 2024-02-19 PROCEDURE — 84145 PROCALCITONIN (PCT): CPT | Performed by: INTERNAL MEDICINE

## 2024-02-19 PROCEDURE — 99233 SBSQ HOSP IP/OBS HIGH 50: CPT | Performed by: INTERNAL MEDICINE

## 2024-02-19 PROCEDURE — 94761 N-INVAS EAR/PLS OXIMETRY MLT: CPT

## 2024-02-19 PROCEDURE — 25010000002 BUMETANIDE PER 0.5 MG: Performed by: INTERNAL MEDICINE

## 2024-02-19 PROCEDURE — 94799 UNLISTED PULMONARY SVC/PX: CPT

## 2024-02-19 PROCEDURE — 97165 OT EVAL LOW COMPLEX 30 MIN: CPT

## 2024-02-19 PROCEDURE — 63710000001 INSULIN DETEMIR PER 5 UNITS: Performed by: INTERNAL MEDICINE

## 2024-02-19 PROCEDURE — 82375 ASSAY CARBOXYHB QUANT: CPT

## 2024-02-19 PROCEDURE — 25810000003 SODIUM CHLORIDE 0.9 % SOLUTION: Performed by: INTERNAL MEDICINE

## 2024-02-19 PROCEDURE — 83735 ASSAY OF MAGNESIUM: CPT | Performed by: INTERNAL MEDICINE

## 2024-02-19 PROCEDURE — 94640 AIRWAY INHALATION TREATMENT: CPT

## 2024-02-19 PROCEDURE — 80202 ASSAY OF VANCOMYCIN: CPT | Performed by: INTERNAL MEDICINE

## 2024-02-19 PROCEDURE — 94664 DEMO&/EVAL PT USE INHALER: CPT

## 2024-02-19 PROCEDURE — 80048 BASIC METABOLIC PNL TOTAL CA: CPT | Performed by: INTERNAL MEDICINE

## 2024-02-19 PROCEDURE — 82948 REAGENT STRIP/BLOOD GLUCOSE: CPT

## 2024-02-19 PROCEDURE — 85025 COMPLETE CBC W/AUTO DIFF WBC: CPT | Performed by: INTERNAL MEDICINE

## 2024-02-19 PROCEDURE — 25010000002 VANCOMYCIN 5 G RECONSTITUTED SOLUTION: Performed by: INTERNAL MEDICINE

## 2024-02-19 PROCEDURE — 84100 ASSAY OF PHOSPHORUS: CPT | Performed by: INTERNAL MEDICINE

## 2024-02-19 PROCEDURE — 82948 REAGENT STRIP/BLOOD GLUCOSE: CPT | Performed by: INTERNAL MEDICINE

## 2024-02-19 PROCEDURE — 36600 WITHDRAWAL OF ARTERIAL BLOOD: CPT

## 2024-02-19 PROCEDURE — 25010000002 CEFEPIME PER 500 MG: Performed by: INTERNAL MEDICINE

## 2024-02-19 PROCEDURE — 83050 HGB METHEMOGLOBIN QUAN: CPT

## 2024-02-19 PROCEDURE — 97161 PT EVAL LOW COMPLEX 20 MIN: CPT

## 2024-02-19 PROCEDURE — 82805 BLOOD GASES W/O2 SATURATION: CPT

## 2024-02-19 PROCEDURE — 63710000001 INSULIN LISPRO (HUMAN) PER 5 UNITS: Performed by: INTERNAL MEDICINE

## 2024-02-19 RX ORDER — NIFEDIPINE 30 MG/1
30 TABLET, EXTENDED RELEASE ORAL EVERY MORNING
Status: DISCONTINUED | OUTPATIENT
Start: 2024-02-19 | End: 2024-02-25 | Stop reason: HOSPADM

## 2024-02-19 RX ORDER — DOXYCYCLINE 100 MG/1
100 CAPSULE ORAL EVERY 12 HOURS SCHEDULED
Status: DISPENSED | OUTPATIENT
Start: 2024-02-19 | End: 2024-02-22

## 2024-02-19 RX ORDER — CETIRIZINE HYDROCHLORIDE 10 MG/1
10 TABLET ORAL DAILY
Status: DISCONTINUED | OUTPATIENT
Start: 2024-02-19 | End: 2024-02-25 | Stop reason: HOSPADM

## 2024-02-19 RX ORDER — HYDROXYZINE HYDROCHLORIDE 25 MG/1
25 TABLET, FILM COATED ORAL 3 TIMES DAILY
Status: DISCONTINUED | OUTPATIENT
Start: 2024-02-19 | End: 2024-02-21

## 2024-02-19 RX ADMIN — NIFEDIPINE 30 MG: 30 TABLET, FILM COATED, EXTENDED RELEASE ORAL at 11:23

## 2024-02-19 RX ADMIN — INSULIN LISPRO 4 UNITS: 100 INJECTION, SOLUTION INTRAVENOUS; SUBCUTANEOUS at 13:07

## 2024-02-19 RX ADMIN — SODIUM HYPOCHLORITE: 1.25 SOLUTION TOPICAL at 18:04

## 2024-02-19 RX ADMIN — Medication 250 MG: at 08:31

## 2024-02-19 RX ADMIN — SODIUM HYPOCHLORITE: 1.25 SOLUTION TOPICAL at 00:35

## 2024-02-19 RX ADMIN — GUAIFENESIN 600 MG: 600 TABLET ORAL at 20:51

## 2024-02-19 RX ADMIN — MONTELUKAST 10 MG: 10 TABLET, FILM COATED ORAL at 20:51

## 2024-02-19 RX ADMIN — Medication 10 ML: at 20:51

## 2024-02-19 RX ADMIN — BUDESONIDE 0.5 MG: 0.5 INHALANT ORAL at 18:19

## 2024-02-19 RX ADMIN — INSULIN DETEMIR 30 UNITS: 100 INJECTION, SOLUTION SUBCUTANEOUS at 20:50

## 2024-02-19 RX ADMIN — DOXYCYCLINE 100 MG: 100 CAPSULE ORAL at 20:51

## 2024-02-19 RX ADMIN — CARVEDILOL 25 MG: 25 TABLET, FILM COATED ORAL at 20:50

## 2024-02-19 RX ADMIN — TAMSULOSIN HYDROCHLORIDE 0.4 MG: 0.4 CAPSULE ORAL at 08:32

## 2024-02-19 RX ADMIN — IPRATROPIUM BROMIDE AND ALBUTEROL SULFATE 3 ML: .5; 3 SOLUTION RESPIRATORY (INHALATION) at 12:14

## 2024-02-19 RX ADMIN — BUDESONIDE 0.5 MG: 0.5 INHALANT ORAL at 07:04

## 2024-02-19 RX ADMIN — SODIUM CHLORIDE 1250 MG: 9 INJECTION, SOLUTION INTRAVENOUS at 11:23

## 2024-02-19 RX ADMIN — COLLAGENASE SANTYL 1 APPLICATION: 250 OINTMENT TOPICAL at 18:04

## 2024-02-19 RX ADMIN — GUAIFENESIN 600 MG: 600 TABLET ORAL at 08:32

## 2024-02-19 RX ADMIN — CEFEPIME 2000 MG: 2 INJECTION, POWDER, FOR SOLUTION INTRAVENOUS at 16:59

## 2024-02-19 RX ADMIN — ARFORMOTEROL TARTRATE 15 MCG: 15 SOLUTION RESPIRATORY (INHALATION) at 18:19

## 2024-02-19 RX ADMIN — FINASTERIDE 5 MG: 5 TABLET, FILM COATED ORAL at 08:32

## 2024-02-19 RX ADMIN — APIXABAN 5 MG: 5 TABLET, FILM COATED ORAL at 08:32

## 2024-02-19 RX ADMIN — APIXABAN 5 MG: 5 TABLET, FILM COATED ORAL at 20:51

## 2024-02-19 RX ADMIN — IPRATROPIUM BROMIDE AND ALBUTEROL SULFATE 3 ML: .5; 3 SOLUTION RESPIRATORY (INHALATION) at 00:23

## 2024-02-19 RX ADMIN — INSULIN DETEMIR 30 UNITS: 100 INJECTION, SOLUTION SUBCUTANEOUS at 08:13

## 2024-02-19 RX ADMIN — Medication 400 MG: at 08:32

## 2024-02-19 RX ADMIN — HYDROXYZINE HYDROCHLORIDE 25 MG: 25 TABLET, FILM COATED ORAL at 20:51

## 2024-02-19 RX ADMIN — IPRATROPIUM BROMIDE AND ALBUTEROL SULFATE 3 ML: .5; 3 SOLUTION RESPIRATORY (INHALATION) at 18:19

## 2024-02-19 RX ADMIN — INSULIN LISPRO 4 UNITS: 100 INJECTION, SOLUTION INTRAVENOUS; SUBCUTANEOUS at 08:14

## 2024-02-19 RX ADMIN — CARVEDILOL 25 MG: 25 TABLET, FILM COATED ORAL at 08:32

## 2024-02-19 RX ADMIN — POTASSIUM & SODIUM PHOSPHATES POWDER PACK 280-160-250 MG 1 PACKET: 280-160-250 PACK at 08:14

## 2024-02-19 RX ADMIN — Medication 400 MG: at 20:51

## 2024-02-19 RX ADMIN — CEFEPIME 2000 MG: 2 INJECTION, POWDER, FOR SOLUTION INTRAVENOUS at 23:57

## 2024-02-19 RX ADMIN — CEFEPIME 2000 MG: 2 INJECTION, POWDER, FOR SOLUTION INTRAVENOUS at 08:35

## 2024-02-19 RX ADMIN — PANTOPRAZOLE SODIUM 40 MG: 40 TABLET, DELAYED RELEASE ORAL at 05:56

## 2024-02-19 RX ADMIN — DULOXETINE HYDROCHLORIDE 30 MG: 30 CAPSULE, DELAYED RELEASE ORAL at 08:35

## 2024-02-19 RX ADMIN — CEFEPIME 2000 MG: 2 INJECTION, POWDER, FOR SOLUTION INTRAVENOUS at 00:35

## 2024-02-19 RX ADMIN — ARFORMOTEROL TARTRATE 15 MCG: 15 SOLUTION RESPIRATORY (INHALATION) at 07:04

## 2024-02-19 RX ADMIN — CETIRIZINE HYDROCHLORIDE 10 MG: 10 TABLET, FILM COATED ORAL at 18:04

## 2024-02-19 RX ADMIN — IPRATROPIUM BROMIDE AND ALBUTEROL SULFATE 3 ML: .5; 3 SOLUTION RESPIRATORY (INHALATION) at 07:04

## 2024-02-19 RX ADMIN — Medication 250 MG: at 20:51

## 2024-02-19 RX ADMIN — PSYLLIUM HUSK 1 PACKET: 3.4 POWDER ORAL at 08:13

## 2024-02-19 RX ADMIN — BUMETANIDE 2 MG: 0.25 INJECTION INTRAMUSCULAR; INTRAVENOUS at 08:13

## 2024-02-19 RX ADMIN — FOLIC ACID 1 MG: 1 TABLET ORAL at 08:32

## 2024-02-19 NOTE — PLAN OF CARE
Goal Outcome Evaluation:      Pt turned in bed and weight shifting in bed. Port re accessed and dressing changed. Dressing changes per wound care. No concerns at this time.  Adeola Haines RN

## 2024-02-19 NOTE — PROGRESS NOTES
Pulmonary / Critical Care Progress Note      Patient Name: Preston Wallis  : 1965  MRN: [id number]  Primary Care Physician:  Kimmy Riley MD  Date of admission: 2024    Subjective   Subjective   Follow-up for hypercapnic and hypoxemic respiratory failure requiring NIPPV    Over past 24 hours: Home NIPPV brought and bedside continues Brovana, Pulmicort, and Eloisa.  Remains on Bumex 2 mg IV daily    No acute events overnight.     This morning,   Remains on 2 L nasal cannula  Lying in bed with family members present  Reports feeling well today  Dyspnea improved  Denies rest pain or chest tightness  Denies cough  No fever or chills  Diuresing well  -5.7 L urine output    Objective   Objective     Vitals:   Temp:  [97.3 °F (36.3 °C)-98.1 °F (36.7 °C)] 97.9 °F (36.6 °C)  Heart Rate:  [71-83] 83  Resp:  [18-20] 20  BP: (144-166)/(63-75) 144/75  Flow (L/min):  [2] 2  Physical Exam   Vital Signs Reviewed   General:  WDWN, Alert, NAD.  Chronically ill-appearing male lying in bed  HEENT:  PERRL, EOMI.  OP, nares clear, no sinus tenderness  Neck:  Supple, no JVD, no thyromegaly  Chest:  good aeration, clear to auscultation bilaterally, no work of breathing noted on 2 L  CV: RRR, no MGR, pulses 2+, equal.  Abd:  Soft, NT, ND, + BS, no HSM, obese  EXT:  no clubbing, no cyanosis, no edema  Neuro:  A&Ox3, CN grossly intact, no focal deficits.  Skin: No rashes or lesions noted    Result Review    Result Review:  I have personally reviewed the results from the time of this admission to 2024 13:34 EST and agree with these findings:  [x]  Laboratory  [x]  Microbiology  [x]  Radiology  [x]  EKG/Telemetry   [x]  Cardiology/Vascular   []  Pathology  []  Old records  []  Other:  Most notable findings include:         Lab 24  0559 24  1632 24  0600 24  0117 24  0044 24  1707 24  1645   WBC 10.72  --  12.53* 13.16*  --  14.84*  --    HEMOGLOBIN 7.7* 7.5* 7.1* 8.1*  --  8.3*   --    HEMATOCRIT 25.7* 24.7* 24.1* 26.7*  --  28.1*  --    PLATELETS 487*  --  454* 455*  --  491*  --    SODIUM 135*  --  138 139  --  138  --    SODIUM, ARTERIAL  --   --   --   --  138.2  --  135.8*   POTASSIUM 4.6  --  4.4 4.3  --  5.2  --    CHLORIDE 99  --  102 102  --  102  --    CO2 26.8  --  27.9 25.3  --  27.8  --    BUN 27*  --  30* 31*  --  29*  --    CREATININE 1.73*  --  1.76* 1.74*  --  1.78*  --    GLUCOSE 162*  --  130* 268*  --  281*  --    GLUCOSE, ARTERIAL  --   --   --   --  249*  --  277*   CALCIUM 8.8  --  8.5* 8.8  --  8.8  --    PHOSPHORUS 2.8  --  3.0 2.3*  --  2.9  --    TOTAL PROTEIN  --   --   --   --   --  8.3  --    ALBUMIN  --   --   --   --   --  2.8*  --    GLOBULIN  --   --   --   --   --  5.5  --        Assessment & Plan   Assessment / Plan     Active Hospital Problems:  Active Hospital Problems    Diagnosis     **Septic joint     Septic arthritis     Skin ulcer of left heel, limited to breakdown of skin     Ulcer of left foot, limited to breakdown of skin     Left foot pain     Type 2 DM with CKD stage 3 and hypertension     Polyneuropathy      Impression:   Acute on chronic hypoxemic and hypercapnic respiratory failure  Acute on chronic COPD exacerbation, FEV1 of 26%  CO2 narcosis respiratory  Acidosis  Elevated BNP, 2590  ERIC on CKD  History of recurrent Pseudomonas infection with MDRO Pseudomonas  History of PE in July 2019  Tobacco abuse in remission     Plan:   -Remain on 2 L.  Continue to maintain SpO2 greater than 90%.  2 L is patient's baseline  -Encourage NIPPV wear at nighttime and as needed during naps.  Okay to wear home NIPPV  -Continue Bumex 2 mg IV daily  -Continue to monitor renal panel and electrolytes.  Replace magnesium  -Continue bronchopulmonary hygiene.  Encourage I-S and flutter  -Continue Brovana, Pulmicort, and DuoNebs  -Encourage mobilization.  Out of bed to chair  -Continue Eliquis for A-fib  -Cefepime per primary  -Rest of care per primary     DVT  prophylaxis:  Medical DVT prophylaxis orders are present.    CODE STATUS:   Code Status (Patient has no pulse and is not breathing): CPR (Attempt to Resuscitate)  Medical Interventions (Patient has pulse or is breathing): Full Support      Labs, imaging, microbiology, notes and medications personally reviewed  Discussed with primary    I, Dr. Severiano Carolina, have spent more than 50% of the total time managing the patient in this encounter today.  This included personally reviewing all pertinent labs, imaging, microbiology and documentation. Also discussing the case with the patient and any available family, the admitting physician and any available ancillary staff.    Electronically signed by CON Sampson, 02/19/24, 1:34 PM EST.  Electronically signed by Severiano Carolina MD, 02/19/24, 3:23 PM EST.

## 2024-02-19 NOTE — THERAPY EVALUATION
Patient Name: Preston Wallis  : 1965    MRN: [id number]                              Today's Date: 2024       Admit Date: 2024    Visit Dx:     ICD-10-CM ICD-9-CM   1. Decreased activities of daily living (ADL)  Z78.9 V49.89   2. Difficulty walking  R26.2 719.7     Patient Active Problem List   Diagnosis    Polyneuropathy    Paroxysmal atrial fibrillation    Obstructive sleep apnea    MRSA pneumonia    Low back pain    Chronic diastolic heart failure    Allergies    COPD exacerbation    Chronic anticoagulation    Benign prostatic hyperplasia    Impaired mobility and endurance    Stage 3a chronic kidney disease    Iron deficiency anemia secondary to inadequate dietary iron intake    Vitamin D deficiency    Class 3 severe obesity with serious comorbidity in adult    Lower extremity edema    Elevated alkaline phosphatase level    Venous insufficiency (chronic) (peripheral)    Tobacco abuse, in remission    History of Pseudomonas pneumonia    Chronic dyspnea    Gastroesophageal reflux disease    Bronchiectasis without complication    ERIC (acute kidney injury)    Altered mental status    Hyperlipidemia    Luetscher's syndrome    Neurogenic bladder    Class 1 obesity    Pneumonia due to Pseudomonas species    Seizures    Primary osteoarthritis of left knee    Other constipation    Chronic obstructive pulmonary disease    Type 2 DM with CKD stage 3 and hypertension    Essential hypertension    Stage 3b chronic kidney disease    Annual physical exam    Long-term use of high-risk medication    Personal history of PE (pulmonary embolism)    Encounter for aftercare for healing closed traumatic fracture of left femur    Chronic pain of left knee    Primary osteoarthritis of right knee    Sepsis    Bronchiectasis    Bacterial pneumonia    Anemia    Closed fracture of left tibial plateau    Septic joint    Septic arthritis    Skin ulcer of left heel, limited to breakdown of skin    Ulcer of left foot, limited to  breakdown of skin    Left foot pain     Past Medical History:   Diagnosis Date    Age-related cognitive decline     Allergic contact dermatitis     Allergies     Anemia     Bronchiectasis with acute lower respiratory infection     Charcot foot due to diabetes mellitus 9/10/2013    Chronic diastolic (congestive) heart failure     Chronic kidney disease     Chronic respiratory failure with hypoxia     Closed supracondylar fracture of femur 1/12/2022    COPD (chronic obstructive pulmonary disease)     Deep vein thrombosis (DVT) of lower extremity associated with air travel 1/13/2023    Dependence on supplemental oxygen     Eczema     Erectile dysfunction     due to organic reasons    Essential (primary) hypertension     Fracture     closed fracture of other tarsal and metatarsal bones    Fracture of proximal humerus 1/13/2023    GERD without esophagitis     High risk medication use     Hypercholesteremia     Hypomagnesemia     Infected stasis ulcer of left lower extremity 1/13/2023    Insomnia     Low back pain     Major depressive disorder     Morbid (severe) obesity due to excess calories     MRSA pneumonia     Muscle weakness     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 without evidence of necrosis     Obstructive sleep apnea (adult) (pediatric)     Other forms of dyspnea     Other long term (current) drug therapy     Other specified noninfective gastroenteritis and colitis     Other spondylosis, lumbar region     Pain in both knees     Paroxysmal atrial fibrillation     Peripheral neuropathy     attributed to type 2 diabetes    Pneumonia, unspecified organism     Polyneuropathy     Rash and other nonspecific skin eruption     Syncope and collapse     Tachycardia     Tinnitus 1/13/2023    Type 1 diabetes mellitus with diabetic chronic kidney disease     Type 2 diabetes mellitus     Unspecified fall, initial encounter     Urinary retention      Past Surgical History:    Procedure Laterality Date    CHOLECYSTECTOMY      CYSTOSCOPY      FEMUR SURGERY Left     Shravan placed    KNEE SURGERY Left     OTHER SURGICAL HISTORY Left     venous port    TIBIAL PLATEAU OPEN REDUCTION INTERNAL FIXATION Left 12/22/2023    Procedure: TIBIAL PLATEAU OPEN REDUCTION INTERNAL FIXATION;  Surgeon: Hugo Kline MD;  Location: [facility] OR;  Service: Orthopedics;  Laterality: Left;    TONSILLECTOMY AND ADENOIDECTOMY        General Information       Row Name 02/19/24 1518          OT Time and Intention    Document Type evaluation  -     Mode of Treatment individual therapy;occupational therapy  -       Row Name 02/19/24 1518          General Information    Patient Profile Reviewed yes  -LF     Prior Level of Function --  Recently at [facility] for rehab, typically requires assist from wife for ADLs, ambulates short distances using /home health PT, mostly uses manual w/c which he self-propels, has a walk-in shower with chair/grab bars, e.commode, and wears 2L home O2.  -     Existing Precautions/Restrictions fall;non-weight bearing  NWB LLE  -     Barriers to Rehab none identified  -       Row Name 02/19/24 1518          Occupational Profile    Reason for Services/Referral (Occupational Profile) Patient is a 58 year old male admitted to [facility] on February 16th, 2024. Occupational therapy consulted due to recent decline in ADLs/functional transfers. No previous occupational therapy services for current condition.  -       Row Name 02/19/24 1518          Living Environment    People in Home spouse  -       Row Name 02/19/24 1518          Home Main Entrance    Number of Stairs, Main Entrance none  Ramp  -       Row Name 02/19/24 1518          Cognition    Orientation Status (Cognition) oriented x 3  -       Row Name 02/19/24 1518          Safety Issues, Functional Mobility    Safety Issues Affecting Function (Mobility) awareness of need for assistance;insight into  deficits/self-awareness;safety precaution awareness  -     Impairments Affecting Function (Mobility) balance;endurance/activity tolerance;pain;strength  -               User Key  (r) = Recorded By, (t) = Taken By, (c) = Cosigned By      Initials Name Provider Type     Lisa Miranda OT Occupational Therapist                     Mobility/ADL's       [facility] Name 02/19/24 1522          Bed Mobility    Comment, (Bed Mobility) Pt completed bed mobility with PT prior to OT's arrival, requiring min assist. Further functional transfers deferred.  -[facility] Name 02/19/24 1522          Activities of Daily Living    BADL Assessment/Intervention bathing;upper body dressing;lower body dressing;grooming;feeding;toileting  -LF       Row Name 02/19/24 1522          Mobility    Extremity Weight-bearing Status left lower extremity  -     Left Lower Extremity (Weight-bearing Status) non weight-bearing (NWB)  -LF       Row Name 02/19/24 1522          Bathing Assessment/Intervention    [place] Level (Bathing) bathing skills;upper body;minimum assist (75% patient effort);lower body;maximum assist (25% patient effort);dependent (less than 25% patient effort)  -LF       Row Name 02/19/24 1522          Upper Body Dressing Assessment/Training    [place] Level (Upper Body Dressing) upper body dressing skills;minimum assist (75% patient effort)  -LF       Row Name 02/19/24 1522          Lower Body Dressing Assessment/Training    [place] Level (Lower Body Dressing) lower body dressing skills;maximum assist (25% patient effort);dependent (less than 25% patient effort)  -LF       Row Name 02/19/24 1522          Grooming Assessment/Training    [place] Level (Grooming) grooming skills;standby assist  -LF       Row Name 02/19/24 1522          Self-Feeding Assessment/Training    [place] Level (Feeding) feeding skills;set up  -LF       Row Name 02/19/24 1522          Toileting Assessment/Training    [place]  Level (Toileting) toileting skills;maximum assist (25% patient effort);dependent (less than 25% patient effort)  -               User Key  (r) = Recorded By, (t) = Taken By, (c) = Cosigned By      Initials Name Provider Type     Lisa Miranda OT Occupational Therapist                   Obj/Interventions       Row Name 02/19/24 1531          Sensory Assessment (Somatosensory)    Sensory Assessment (Somatosensory) UE sensation intact  -LF       Row Name 02/19/24 1531          Vision Assessment/Intervention    Visual Impairment/Limitations WFL  -LF       Row Name 02/19/24 1531          Range of Motion Comprehensive    Comment, General Range of Motion ~45° active right shoulder flexion, full AROM distally, and throughout LUE.  -LF       Row Name 02/19/24 1531          Strength Comprehensive (MMT)    Comment, General Manual Muscle Testing (MMT) Assessment 2+/5 right a.delt/lower trap, 4+/5 distally, and 4+/5 throughout LUE.  -LF       Row Name 02/19/24 1531          Motor Skills    Motor Skills coordination;functional endurance  -LF     Coordination bilateral;upper extremity;WFL  -LF     Functional Endurance Fair-  -LF       Row Name 02/19/24 1531          Balance    Balance Assessment --  -               User Key  (r) = Recorded By, (t) = Taken By, (c) = Cosigned By      Initials Name Provider Type    Lisa Sheffield OT Occupational Therapist                   Goals/Plan       Row Name 02/19/24 1533          Bed Mobility Goal 1 (OT)    Activity/Assistive Device (Bed Mobility Goal 1, OT) bed mobility activities, all  -     [place] Level/Cues Needed (Bed Mobility Goal 1, OT) standby assist  -     Time Frame (Bed Mobility Goal 1, OT) long term goal (LTG);10 days  -LF       Row Name 02/19/24 1533          Transfer Goal 1 (OT)    Activity/Assistive Device (Transfer Goal 1, OT) transfers, all;walker, rolling  -     [place] Level/Cues Needed (Transfer Goal 1, OT) minimum assist (75% or more  patient effort)  -LF     Time Frame (Transfer Goal 1, OT) long term goal (LTG);10 days  -       Row Name 02/19/24 1533          Bathing Goal 1 (OT)    Activity/Device (Bathing Goal 1, OT) bathing skills, all  -LF     [place] Level/Cues Needed (Bathing Goal 1, OT) minimum assist (75% or more patient effort)  -LF     Time Frame (Bathing Goal 1, OT) long term goal (LTG);10 days  -       Row Name 02/19/24 1533          Dressing Goal 1 (OT)    Activity/Device (Dressing Goal 1, OT) dressing skills, all  -LF     [place]/Cues Needed (Dressing Goal 1, OT) minimum assist (75% or more patient effort)  -LF     Time Frame (Dressing Goal 1, OT) long term goal (LTG);10 days  -[facility] Name 02/19/24 1533          Toileting Goal 1 (OT)    Activity/Device (Toileting Goal 1, OT) toileting skills, all  -LF     [place] Level/Cues Needed (Toileting Goal 1, OT) minimum assist (75% or more patient effort)  -LF     Time Frame (Toileting Goal 1, OT) long term goal (LTG);10 days  -       Row Name 02/19/24 1533          Problem Specific Goal 1 (OT)    Problem Specific Goal 1 (OT) Patient will demonstrate fair+ endurance to support ADLs/functional transfers.  -     Time Frame (Problem Specific Goal 1, OT) long term goal (LTG);10 days  -[facility] Name 02/19/24 1533          Therapy Assessment/Plan (OT)    Planned Therapy Interventions (OT) activity tolerance training;BADL retraining;functional balance retraining;occupation/activity based interventions;patient/caregiver education/training;strengthening exercise;transfer/mobility retraining  -               User Key  (r) = Recorded By, (t) = Taken By, (c) = Cosigned By      Initials Name Provider Type     Lisa Miranda OT Occupational Therapist                   Clinical Impression       Row Name 02/19/24 1532          Pain Assessment    Additional Documentation Pain Scale: FACES Pre/Post-Treatment (Group)  -[facility] Name 02/19/24 1532          Pain Scale:  FACES Pre/Post-Treatment    Pain: FACES Scale, Pretreatment 2-->hurts little bit  -LF     Posttreatment Pain Rating 2-->hurts little bit  -LF     Pain Location generalized  -LF       Row Name 02/19/24 1532          Plan of Care Review    Plan of Care Reviewed With patient;spouse;daughter  -LF     Progress no change  -LF     Outcome Evaluation Patient presents with limitations in self-care, functional transfers, balane, and endurance. He would benefit from continued skilled occupational therapy services to maximize independence with ADLs/functional transfers.  -       Row Name 02/19/24 1532          Therapy Assessment/Plan (OT)    Patient/Family Therapy Goal Statement (OT) To maximize independence.  -     Rehab Potential (OT) good, to achieve stated therapy goals  -     Criteria for Skilled Therapeutic Interventions Met (OT) yes;meets criteria;skilled treatment is necessary  -     Therapy Frequency (OT) 5 times/wk  -       Row Name 02/19/24 1532          Therapy Plan Review/Discharge Plan (OT)    Anticipated Discharge Disposition (OT) sub acute care setting  -       Row Name 02/19/24 1532          Vital Signs    O2 Delivery Pre Treatment nasal cannula  -     O2 Delivery Intra Treatment nasal cannula  -     O2 Delivery Post Treatment nasal cannula  -       Row Name 02/19/24 1532          Positioning and Restraints    Pre-Treatment Position in bed  -LF     Post Treatment Position bed  -LF     In Bed fowlers;call light within reach;encouraged to call for assist  -               User Key  (r) = Recorded By, (t) = Taken By, (c) = Cosigned By      Initials Name Provider Type    LF Lisa Miranda, OT Occupational Therapist                   Outcome Measures       Row Name 02/19/24 1533          How much help from another is currently needed...    Putting on and taking off regular lower body clothing? 1  -LF     Bathing (including washing, rinsing, and drying) 2  -LF     Toileting (which includes using  toilet bed pan or urinal) 1  -LF     Putting on and taking off regular upper body clothing 3  -LF     Taking care of personal grooming (such as brushing teeth) 3  -LF     Eating meals 4  -LF     AM-PAC 6 Clicks Score (OT) 14  -LF       Row Name 02/19/24 1500 02/19/24 0832       How much help from another person do you currently need...    Turning from your back to your side while in flat bed without using bedrails? 3  -AV 3  -JJ    Moving from lying on back to sitting on the side of a flat bed without bedrails? 3  -AV 3  -JJ    Moving to and from a bed to a chair (including a wheelchair)? 1  -AV 2  -JJ    Standing up from a chair using your arms (e.g., wheelchair, bedside chair)? 2  -AV 2  -JJ    Climbing 3-5 steps with a railing? 1  -AV 1  -JJ    To walk in hospital room? 1  -AV 1  -JJ    AM-PAC 6 Clicks Score (PT) 11  -AV 12  -JJ    Highest Level of Mobility Goal 4 --> Transfer to chair/commode  -AV 4 --> Transfer to chair/commode  -JJ      Row Name 02/19/24 1533 02/19/24 1500       Functional Assessment    Outcome Measure Options AM-PAC 6 Clicks Daily Activity (OT);Optimal Instrument  -LF AM-PAC 6 Clicks Basic Mobility (PT)  -AV      Row Name 02/19/24 1533          Optimal Instrument    Optimal Instrument Optimal - 3  -LF     Bending/Stooping 4  -LF     Standing 5  -LF     Reaching 2  -LF     From the list, choose the 3 activities you would most like to be able to do without any difficulty Bending/stooping;Standing;Reaching  -LF     Total Score Optimal - 3 11  -LF               User Key  (r) = Recorded By, (t) = Taken By, (c) = Cosigned By      Initials Name Provider Type    LF Lisa Miranda OT Occupational Therapist    Andrea Henry, PT Physical Therapist    Alix Montoya, RN Registered Nurse                    Occupational Therapy Education       Title: PT OT SLP Therapies (In Progress)       Topic: Occupational Therapy (In Progress)       Point: ADL training (In Progress)       Description:    Instruct learner(s) on proper safety adaptation and remediation techniques during self care or transfers.   Instruct in proper use of assistive devices.                  Learning Progress Summary             Patient Acceptance, E,TB, NR by  at 2/19/2024 1534                         Point: Precautions (In Progress)       Description:   Instruct learner(s) on prescribed precautions during self-care and functional transfers.                  Learning Progress Summary             Patient Acceptance, E,TB, NR by  at 2/19/2024 1534                         Point: Body mechanics (In Progress)       Description:   Instruct learner(s) on proper positioning and spine alignment during self-care, functional mobility activities and/or exercises.                  Learning Progress Summary             Patient Acceptance, E,TB, NR by  at 2/19/2024 1534                                         User Key       Initials Effective Dates Name Provider Type Discipline     06/16/21 -  Lisa Miranda OT Occupational Therapist OT                  OT Recommendation and Plan  Planned Therapy Interventions (OT): activity tolerance training, BADL retraining, functional balance retraining, occupation/activity based interventions, patient/caregiver education/training, strengthening exercise, transfer/mobility retraining  Therapy Frequency (OT): 5 times/wk  Plan of Care Review  Plan of Care Reviewed With: patient, spouse, daughter  Progress: no change  Outcome Evaluation: Patient presents with limitations in self-care, functional transfers, balane, and endurance. He would benefit from continued skilled occupational therapy services to maximize independence with ADLs/functional transfers.     Time Calculation:   Evaluation Complexity (OT)  Review Occupational Profile/Medical/Therapy History Complexity: brief/low complexity  Assessment, Occupational Performance/Identification of Deficit Complexity: 3-5 performance deficits  Clinical  Decision Making Complexity (OT): problem focused assessment/low complexity  Overall Complexity of Evaluation (OT): low complexity     Time Calculation- OT       Row Name 02/19/24 1534             Time Calculation- OT    OT Received On 02/19/24  -LF      OT Goal Re-Cert Due Date 02/28/24  -LF         Untimed Charges    OT Eval/Re-eval Minutes 33  -LF         Total Minutes    Untimed Charges Total Minutes 33  -LF       Total Minutes 33  -LF                User Key  (r) = Recorded By, (t) = Taken By, (c) = Cosigned By      Initials Name Provider Type    LF Lisa Miranda, OT Occupational Therapist                  Therapy Charges for Today       Code Description Service Date Service Provider Modifiers Qty    [id number] HC OT EVAL LOW COMPLEXITY 3 2/19/2024 Lisa Miranda OT  1                 Lisa Miranda OT  2/19/2024

## 2024-02-19 NOTE — PROGRESS NOTES
[facility]   Hospitalist Progress Note    Date of admission: 2/16/2024  Patient Name: Preston Wallis  1965  Date: 2/19/2024      Subjective     No chief complaint on file.    Interval Followup: Less short of air today. Pt notes itching skin, over abdomen and lower extremities, thinks may be associated with abx.  Patient is been on antibiotics for several days at this point has not had any associated respiratory issues or other symptoms associated with allergic reaction.  Patient's wife is asking if his antibiotics can make him agitated as he noted he has been more thoroughly lately.  Objective     Vitals:   Temp:  [97.3 °F (36.3 °C)-98.2 °F (36.8 °C)] 98.2 °F (36.8 °C)  Heart Rate:  [71-83] 80  Resp:  [18-20] 18  BP: (144-166)/(64-75) 144/64  Flow (L/min):  [2] 2    Physical Exam  Chronic ill-appearing morbidly obese in chair, family at bedside  Bilateral rhonchi, improving aeration, less wheezing today on nasal cannula with mild conversational dyspnea  RRR  Abdomen soft obese, has some petechiae from where it appears he has been scratching on his chest/abdomen, no obvious superficial skin rash  Left lower extremity wound dressed no discharge or drainage, dry skin on left leg    Result Review:  Vital signs, labs and recent relevant imaging reviewed.        acetaminophen    albuterol    aluminum-magnesium hydroxide-simethicone    benzonatate    senna-docusate sodium **AND** polyethylene glycol **AND** bisacodyl **AND** bisacodyl    dextrose    dextrose    Diclofenac Sodium    gabapentin    glucagon (human recombinant)    guaiFENesin-dextromethorphan    hydrALAZINE    hydrOXYzine    Lidocaine    melatonin    nicotine    ondansetron    Pharmacy Consult - Pharmacy to dose    Pharmacy to dose vancomycin    prochlorperazine    sodium chloride    sodium chloride    sodium chloride    apixaban, 5 mg, Oral, Q12H  arformoterol, 15 mcg, Nebulization, BID - RT  budesonide, 0.5 mg, Nebulization, BID - RT  bumetanide, 2  mg, Intravenous, Daily  carvedilol, 25 mg, Oral, Q12H  cefepime, 2,000 mg, Intravenous, Q8H  collagenase, 1 Application, Topical, Q24H  DULoxetine, 30 mg, Oral, Daily  [Held by provider] ferrous sulfate, 325 mg, Oral, Daily With Breakfast  finasteride, 5 mg, Oral, Daily  folic acid, 1 mg, Oral, Daily  guaiFENesin, 600 mg, Oral, Q12H  insulin detemir, 30 Units, Subcutaneous, BID  insulin lispro, 4-24 Units, Subcutaneous, 4x Daily AC & at Bedtime  ipratropium-albuterol, 3 mL, Nebulization, 4x Daily - RT  [Held by provider] losartan, 25 mg, Oral, Q24H  magnesium oxide, 400 mg, Oral, BID  montelukast, 10 mg, Oral, Nightly  NIFEdipine XL, 30 mg, Oral, QAM  pantoprazole, 40 mg, Oral, Q AM  psyllium, 1 packet, Oral, Daily  saccharomyces boulardii, 250 mg, Oral, BID  senna-docusate sodium, 2 tablet, Oral, BID  sodium chloride, 10 mL, Intravenous, Q12H  Sodium Hypochlorite, , Topical, Q12H  tamsulosin, 0.4 mg, Oral, Daily        CT Head Without Contrast    Result Date: 2/17/2024   Motion degraded exam with no definite acute abnormalities.     MIAH GREEN MD       Electronically Signed and Approved By: MIAH GREEN MD on 2/17/2024 at 1:21             XR Chest 1 View    Result Date: 2/17/2024   No significant interval change.       MIAH GREEN MD       Electronically Signed and Approved By: MIAH GREEN MD on 2/17/2024 at 1:01             XR Chest 1 View    Result Date: 2/16/2024    1. There is borderline heart size with pulmonary vascular congestion 2. Chronic bilateral airspace disease       WHIT HARDIN MD       Electronically Signed and Approved By: WHIT HARDIN MD on 2/16/2024 at 16:52             XR chest AP portable    Result Date: 2/16/2024  No significant interval change. Images personally reviewed, interpreted and dictated by SHANT Velasquez.          MRI foot left with and without contrast    Result Date: 2/14/2024  1. Soft tissue ulceration along the plantar/lateral aspect of the forefoot  adjacent to the fifth MTP joint with adjacent cellulitis. No abnormal fluid collections to suggest abscess. 2. There is bone marrow edema in the fifth digit proximal phalanx and distal fifth metatarsal and a trace amount of joint effusion. Findings are concerning for septic arthritis with reactive marrow edema. No definite osseous destructive changes/abnormal bone marrow signal changes to suggest osteomyelitis. 3. Mild multijoint degenerative changes of the remaining MTP joints with a trace amount of joint effusion, and without abnormal MRI signal changes or enhancement, likely representing degenerative effusions. Images personally reviewed, interpreted and dictated by SHANT Velasquez.     2/2/24 echo from OSH  Normal LV wall motion with estimated LV ejection fraction 50 to 55%   Mild concentric LVH   Normal valvular structure and function       Assessment / Plan     Summary: 58-year-old male morbidly obese COPD on 2 L baseline, CKD 3/4, MILADY not adherent to BiPAP, chronic nursing home resident who presented to the outside hospital on 2/11 with pneumonia.  Patient was recently admitted to [facility] with tibial plateau fracture.  Transferred here for further evaluation concerned that he may need podiatry intervention for foot infection.  Podiatry evaluated no acute intervention required apart from local wound care and some superficial debridement.  Patient with acute hypoxemic hypercapnic respiratory failure requiring continuous BiPAP initially has issues with nonadherence to this as outpatient.  Pulmonology and podiatry assisting.  Treated with IV antibiotics for foot infection and pneumonia.  Rehab versus home health for discharge.  Hopefully in the next 2 days.    Assessment/Plan (clinically significant if listed here)  Sepsis secondary to Pneumonia, bacterial  Acute hypoxemic hypercapnic respiratory failure  MILADY noncompliant with home bipap  Acute metabolic encephalopathy in setting of above  Diabetic foot  Infection/SSTI, unspecified organism   Diastolic CHF with exacerbation 2/2024 from outside hospital EF 50%  CKD3/4 baseline creatinine ~1.4-1.7  Paroxysmal Afib on apixaban  CAD  COPD with exacerbation, baseline 2 L oxygen  Hx of DVT  IDDM2  Iron deficiency anemia   BPH  Hx of neurogenic bladder with chronic lopez  Chronic NH resident  Morbid Obesity  Polypharmacy     Leukocytosis improving, Pro-Cristi decreased, given advanced COPD and pneumonia and diabetic foot infection on IV Vanco and cefepime, has a history of MRSA pna 12/2023 infection.  Will change vancomycin to doxycycline and continue cefepime for now,   Cultures here are unremarkable for specific organism, will try to obtain patient records prior hospital.  Continue brov/pulm, scheduled nebs respiratory hygiene  Monitor sedation, continuous pulse ox, if pt has increasing lethargy need to be placed back on BiPAP, suspect episodes of increased confusion related to hypercarbia, doing better with continued adherence to bipap regimen. Have also adjusted home medicaiton regimen, givne renal fxn have decreased duloxetine, monitor, may benefit from alternative agent  Appreciate podiatry assistance, no additional surgical invention need further exam  Discussed with pulmonology appreciate assistance, monitoring BiPAP and continue respiratory hygiene management as above  Hb variable, AOCD, has NAEL and AOCD, monitor, change famotidine to ppi for now.    Iv bumex  Continue Coreg, nifedipine  use lidocaine prn if needed, have held gabapentin without acute neuropathy complaints  if resuming gabapentin will use lower dose   Continue insulin monitor  Wound care to LE   Replace magnesium  Stop flomax/bph meds as pt with chronic catheter   PT/OT  Check a.m. CBC, BMP, magnesium, phosphorus  Continue hospitalization at current level of care, telemetry,     Pt was at [facility], now looking for another facility vs        DVT prophylaxis:  Medical DVT prophylaxis orders are  present.        Code Status (Patient has no pulse and is not breathing): CPR (Attempt to Resuscitate)  Medical Interventions (Patient has pulse or is breathing): Full Support        CBC          2/17/2024    01:17 2/18/2024    06:00 2/18/2024    16:32 2/19/2024    05:59   CBC   WBC 13.16  12.53   10.72    RBC 3.02  2.68   2.90    Hemoglobin 8.1  7.1  7.5  7.7    Hematocrit 26.7  24.1  24.7  25.7    MCV 88.4  89.9   88.6    MCH 26.8  26.5   26.6    MCHC 30.3  29.5   30.0    RDW 14.4  14.7   14.6    Platelets 455  454   487        CMP          2/17/2024    00:44 2/17/2024    01:17 2/18/2024    06:00 2/19/2024    05:59   CMP   Glucose 249  268  130  162    BUN  31  30  27    Creatinine  1.74  1.76  1.73    EGFR  44.9  44.3  45.2    Sodium 138.2  139  138  135    Potassium  4.3  4.4  4.6    Chloride  102  102  99    Calcium  8.8  8.5  8.8    BUN/Creatinine Ratio  17.8  17.0  15.6    Anion Gap  11.7  8.1  9.2

## 2024-02-19 NOTE — PROGRESS NOTES
"[facility] Clinical Pharmacy Services: Vancomycin Monitoring Note    Preston Wallis is a 58 y.o. male who is on day 9  of  vancomycin (received 6 days at outside hospital)    Previous Vancomycin Dose:   500 mg IV   @1138 ([place]) yesterday  Imaging Reviewed?: Yes  Updated Cultures and Sensitivities:    Resp Culture- Rare gram neg bacilli, Many WBC, Rare epithelial cells   Urine- Legionella, S Pneumo- Negative antigen    Blood- NGTD    Vitals/Labs  Ht: 175.3 cm (69\"); Wt: 126 kg (276 lb 10.8 oz)   Temp (24hrs), Av.8 °F (36.6 °C), Min:97.3 °F (36.3 °C), Max:98.1 °F (36.7 °C)   Estimated Creatinine Clearance: 61.1 mL/min (A) (by C-G formula based on SCr of 1.73 mg/dL (H)).     Results from last 7 days   Lab Units 24  0559 24  1304 24  0600 24  0117 24  1707 24  1029 24  1440   VANCOMYCIN RM mcg/mL 19.76 21.93 23.63  24.00 20.80  --   --   --    VANCOMYCIN TR ug/mL  --   --   --   --   --  17.5 26.1*   CREATININE mg/dL 1.73*  --  1.76* 1.74*   < >  --   --    WBC 10*3/mm3 10.72  --  12.53* 13.16*   < >  --   --     < > = values in this interval not displayed.     Assessment/Plan  Vancomycin level this AM reported as 19.76. Previous dose received on  @1534. Will re-dose today with 1250 mg for one dose.    Next Vanc Random ordered for am.  We will continue to monitor patient changes and renal function     Thank you for involving pharmacy in this patient's care. Please contact pharmacy with any questions or concerns.    Pete Grimm [facility]  Clinical Pharmacist     "

## 2024-02-19 NOTE — PLAN OF CARE
Goal Outcome Evaluation:  Plan of Care Reviewed With: patient, spouse, daughter        Progress: no change  Outcome Evaluation: Patient presents with limitations in self-care, functional transfers, balane, and endurance. He would benefit from continued skilled occupational therapy services to maximize independence with ADLs/functional transfers.      Anticipated Discharge Disposition (OT): sub acute care setting

## 2024-02-19 NOTE — THERAPY EVALUATION
Acute Care - Physical Therapy Initial Evaluation  YAIMA Stockton     Patient Name: Preston Wallis  : 1965  MRN: [id number]  Today's Date: 2024      Visit Dx:     ICD-10-CM ICD-9-CM   1. Decreased activities of daily living (ADL)  Z78.9 V49.89   2. Difficulty walking  R26.2 719.7     Patient Active Problem List   Diagnosis    Polyneuropathy    Paroxysmal atrial fibrillation    Obstructive sleep apnea    MRSA pneumonia    Low back pain    Chronic diastolic heart failure    Allergies    COPD exacerbation    Chronic anticoagulation    Benign prostatic hyperplasia    Impaired mobility and endurance    Stage 3a chronic kidney disease    Iron deficiency anemia secondary to inadequate dietary iron intake    Vitamin D deficiency    Class 3 severe obesity with serious comorbidity in adult    Lower extremity edema    Elevated alkaline phosphatase level    Venous insufficiency (chronic) (peripheral)    Tobacco abuse, in remission    History of Pseudomonas pneumonia    Chronic dyspnea    Gastroesophageal reflux disease    Bronchiectasis without complication    ERIC (acute kidney injury)    Altered mental status    Hyperlipidemia    Luetscher's syndrome    Neurogenic bladder    Class 1 obesity    Pneumonia due to Pseudomonas species    Seizures    Primary osteoarthritis of left knee    Other constipation    Chronic obstructive pulmonary disease    Type 2 DM with CKD stage 3 and hypertension    Essential hypertension    Stage 3b chronic kidney disease    Annual physical exam    Long-term use of high-risk medication    Personal history of PE (pulmonary embolism)    Encounter for aftercare for healing closed traumatic fracture of left femur    Chronic pain of left knee    Primary osteoarthritis of right knee    Sepsis    Bronchiectasis    Bacterial pneumonia    Anemia    Closed fracture of left tibial plateau    Septic joint    Septic arthritis    Skin ulcer of left heel, limited to breakdown of skin    Ulcer of left foot,  limited to breakdown of skin    Left foot pain     Past Medical History:   Diagnosis Date    Age-related cognitive decline     Allergic contact dermatitis     Allergies     Anemia     Bronchiectasis with acute lower respiratory infection     Charcot foot due to diabetes mellitus 9/10/2013    Chronic diastolic (congestive) heart failure     Chronic kidney disease     Chronic respiratory failure with hypoxia     Closed supracondylar fracture of femur 1/12/2022    COPD (chronic obstructive pulmonary disease)     Deep vein thrombosis (DVT) of lower extremity associated with air travel 1/13/2023    Dependence on supplemental oxygen     Eczema     Erectile dysfunction     due to organic reasons    Essential (primary) hypertension     Fracture     closed fracture of other tarsal and metatarsal bones    Fracture of proximal humerus 1/13/2023    GERD without esophagitis     High risk medication use     Hypercholesteremia     Hypomagnesemia     Infected stasis ulcer of left lower extremity 1/13/2023    Insomnia     Low back pain     Major depressive disorder     Morbid (severe) obesity due to excess calories     MRSA pneumonia     Muscle weakness     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 without evidence of necrosis     Obstructive sleep apnea (adult) (pediatric)     Other forms of dyspnea     Other long term (current) drug therapy     Other specified noninfective gastroenteritis and colitis     Other spondylosis, lumbar region     Pain in both knees     Paroxysmal atrial fibrillation     Peripheral neuropathy     attributed to type 2 diabetes    Pneumonia, unspecified organism     Polyneuropathy     Rash and other nonspecific skin eruption     Syncope and collapse     Tachycardia     Tinnitus 1/13/2023    Type 1 diabetes mellitus with diabetic chronic kidney disease     Type 2 diabetes mellitus     Unspecified fall, initial encounter     Urinary retention      Past Surgical  History:   Procedure Laterality Date    CHOLECYSTECTOMY      CYSTOSCOPY      FEMUR SURGERY Left     Shravan placed    KNEE SURGERY Left     OTHER SURGICAL HISTORY Left     venous port    TIBIAL PLATEAU OPEN REDUCTION INTERNAL FIXATION Left 12/22/2023    Procedure: TIBIAL PLATEAU OPEN REDUCTION INTERNAL FIXATION;  Surgeon: Hugo Kline MD;  Location: [facility];  Service: Orthopedics;  Laterality: Left;    TONSILLECTOMY AND ADENOIDECTOMY       PT Assessment (last 12 hours)       PT Evaluation and Treatment       Row Name 02/19/24 1522          Physical Therapy Time and Intention    Document Type evaluation  -AV     Mode of Treatment individual therapy;physical therapy  -AV       Row Name 02/19/24 1522          General Information    Patient Profile Reviewed yes  -AV     Prior Level of Function --  Recently at [facility] for rehab. At baseline, requires assist for ADLs and ambulated with RW. Primarily uses manual w/c for mobility. 2 L O2.  -AV     Existing Precautions/Restrictions fall;non-weight bearing  NWB LLE  -AV       Row Name 02/19/24 1522          Living Environment    People in Home spouse  -AV       Row Name 02/19/24 1522          Range of Motion (ROM)    Range of Motion bilateral lower extremities;ROM is WFL  -AV       Row Name 02/19/24 1529          Mobility    Extremity Weight-bearing Status left lower extremity  -AV     Left Lower Extremity (Weight-bearing Status) non weight-bearing (NWB)  -AV       Row Name 02/19/24 1529          Bed Mobility    Bed Mobility supine-sit;sit-supine  -AV     Supine-Sit [place] (Bed Mobility) minimum assist (75% patient effort)  -AV     Sit-Supine [place] (Bed Mobility) minimum assist (75% patient effort)  -AV     Bed Mobility, Safety Issues decreased use of legs for bridging/pushing;decreased use of arms for pushing/pulling  -AV     Assistive Device (Bed Mobility) head of bed elevated;draw sheet  -AV       Row Name 02/19/24 1524           Transfers    Comment, (Transfers) Patient completed partial stand x2 with moderate assist but is unable to stand fully upright or maintain NWB to LLE  -AV       Row Name 02/19/24 1529          Safety Issues, Functional Mobility    Safety Issues Affecting Function (Mobility) awareness of need for assistance;insight into deficits/self-awareness;impulsivity;judgment;safety precaution awareness  -AV     Impairments Affecting Function (Mobility) balance;endurance/activity tolerance;pain;strength  -AV       Row Name 02/19/24 1529          Balance    Balance Assessment standing static balance  -AV     Static Standing Balance maximum assist  -AV     Position/Device Used, Standing Balance supported  -AV       Row Name             Wound 12/22/23 1322 Left posterior heel Pressure Injury    Wound - Properties Group Placement Date: 12/22/23  -LH Placement Time: 1322  -LH Side: Left  -LH Orientation: posterior  -LH Location: heel  -LH Primary Wound Type: Pressure inj  -LH    Retired Wound - Properties Group Placement Date: 12/22/23  -LH Placement Time: 1322  -LH Side: Left  -LH Orientation: posterior  -LH Location: heel  -LH Primary Wound Type: Pressure inj  -LH    Retired Wound - Properties Group Date first assessed: 12/22/23  -LH Time first assessed: 1322  -LH Side: Left  -LH Location: heel  -LH Primary Wound Type: Pressure inj  -LH      Row Name             Wound 02/16/24 1700 Left posterior foot Pressure Injury    Wound - Properties Group Placement Date: 02/16/24  -AG Placement Time: 1700  -AG Side: Left  -AG Orientation: posterior  -AG Location: foot  -AG Primary Wound Type: Pressure inj  -AG    Retired Wound - Properties Group Placement Date: 02/16/24  -AG Placement Time: 1700  -AG Side: Left  -AG Orientation: posterior  -AG Location: foot  -AG Primary Wound Type: Pressure inj  -AG    Retired Wound - Properties Group Date first assessed: 02/16/24  -AG Time first assessed: 1700  -AG Side: Left  -AG Location: foot  -AG  Primary Wound Type: Pressure inj  -AG      Row Name             Wound Left gluteal    Wound - Properties Group Present on Original Admission: Y  -KN Side: Left  -KN Location: gluteal  -KN    Retired Wound - Properties Group Present on Original Admission: Y  -KN Side: Left  -KN Location: gluteal  -KN    Retired Wound - Properties Group Present on Original Admission: Y  -KN Side: Left  -KN Location: gluteal  -KN      Row Name 02/19/24 1529          Plan of Care Review    Plan of Care Reviewed With patient;family  -AV     Progress no change  -AV     Outcome Evaluation Patient presents with deficits in balance, endurance, strength, and transfers. Patient will benefit from skilled PT services to address these mobility deficits and decrease risk of falls.  -AV       Row Name 02/19/24 1529          Therapy Assessment/Plan (PT)    Rehab Potential (PT) fair, will monitor progress closely  -AV     Criteria for Skilled Interventions Met (PT) yes;meets criteria  -AV     Therapy Frequency (PT) daily  -AV     Predicted Duration of Therapy Intervention (PT) 10 days  -AV     Problem List (PT) problems related to;balance;mobility;strength  -AV     Activity Limitations Related to Problem List (PT) unable to transfer safely;unable to ambulate safely  -AV       Row Name 02/19/24 1529          PT Evaluation Complexity    History, PT Evaluation Complexity 1-2 personal factors and/or comorbidities  -AV     Examination of Body Systems (PT Eval Complexity) total of 4 or more elements  -AV     Clinical Presentation (PT Evaluation Complexity) stable  -AV     Clinical Decision Making (PT Evaluation Complexity) low complexity  -AV     Overall Complexity (PT Evaluation Complexity) low complexity  -AV       Row Name 02/19/24 1523          Therapy Plan Review/Discharge Plan (PT)    Therapy Plan Review (PT) evaluation/treatment results reviewed;patient  -AV       Row Name 02/19/24 1529          Physical Therapy Goals    Bed Mobility Goal Selection  (PT) bed mobility, PT goal 1  -AV     Transfer Goal Selection (PT) transfer, PT goal 1  -AV     Gait Training Goal Selection (PT) gait training, PT goal 1  -AV       Row Name 02/19/24 1529          Bed Mobility Goal 1 (PT)    Activity/Assistive Device (Bed Mobility Goal 1, PT) sit to supine/supine to sit  -AV     [place] Level/Cues Needed (Bed Mobility Goal 1, PT) standby assist  -AV     Time Frame (Bed Mobility Goal 1, PT) 10 days  -AV       Row Name 02/19/24 1529          Transfer Goal 1 (PT)    Activity/Assistive Device (Transfer Goal 1, PT) sit-to-stand/stand-to-sit;bed-to-chair/chair-to-bed;walker, rolling  -AV     [place] Level/Cues Needed (Transfer Goal 1, PT) minimum assist (75% or more patient effort)  -AV     Time Frame (Transfer Goal 1, PT) 10 days  -AV       Row Name 02/19/24 1529          Gait Training Goal 1 (PT)    Activity/Assistive Device (Gait Training Goal 1, PT) gait (walking locomotion);assistive device use;walker, rolling  -AV     [place] Level (Gait Training Goal 1, PT) minimum assist (75% or more patient effort)  -AV     Distance (Gait Training Goal 1, PT) 10  -AV     Time Frame (Gait Training Goal 1, PT) 10 days  -AV               User Key  (r) = Recorded By, (t) = Taken By, (c) = Cosigned By      Initials Name Provider Type    Carmen Wise RN Registered Nurse    Maria Isabel Sylvester RN Registered Nurse    Cindy Lorenzo RN Registered Nurse    Andrea Henry, PT Physical Therapist                    Physical Therapy Education       Title: PT OT SLP Therapies (In Progress)       Topic: Physical Therapy (In Progress)       Point: Mobility training (In Progress)       Learning Progress Summary             Patient Acceptance, E,TB, NR by AV at 2/19/2024 1535                         Point: Home exercise program (Not Started)       Learner Progress:  Not documented in this visit.              Point: Body mechanics (In Progress)       Learning Progress Summary              Patient Acceptance, E,TB, NR by AV at 2/19/2024 1535                         Point: Precautions (In Progress)       Learning Progress Summary             Patient Acceptance, E,TB, NR by AV at 2/19/2024 1535                                         User Key       Initials Effective Dates Name Provider Type Discipline    AV 06/11/21 -  Andrea Ruiz, PT Physical Therapist PT                  PT Recommendation and Plan  Anticipated Discharge Disposition (PT): sub acute care setting  Planned Therapy Interventions (PT): balance training, bed mobility training, home exercise program, neuromuscular re-education, strengthening, transfer training, gait training  Therapy Frequency (PT): daily  Plan of Care Reviewed With: patient, family  Progress: no change  Outcome Evaluation: Patient presents with deficits in balance, endurance, strength, and transfers. Patient will benefit from skilled PT services to address these mobility deficits and decrease risk of falls.   Outcome Measures       Row Name 02/19/24 1500             How much help from another person do you currently need...    Turning from your back to your side while in flat bed without using bedrails? 3  -AV      Moving from lying on back to sitting on the side of a flat bed without bedrails? 3  -AV      Moving to and from a bed to a chair (including a wheelchair)? 1  -AV      Standing up from a chair using your arms (e.g., wheelchair, bedside chair)? 2  -AV      Climbing 3-5 steps with a railing? 1  -AV      To walk in hospital room? 1  -AV      AM-PAC 6 Clicks Score (PT) 11  -AV      Highest Level of Mobility Goal 4 --> Transfer to chair/commode  -AV         Functional Assessment    Outcome Measure Options AM-PAC 6 Clicks Basic Mobility (PT)  -AV                User Key  (r) = Recorded By, (t) = Taken By, (c) = Cosigned By      Initials Name Provider Type    AV Andrea Ruiz, PT Physical Therapist                     Time Calculation:    PT Charges        Row Name 02/19/24 1534             Time Calculation    PT Received On 02/19/24  -AV      PT Goal Re-Cert Due Date 02/28/24  -AV         Untimed Charges    PT Eval/Re-eval Minutes 50  -AV         Total Minutes    Untimed Charges Total Minutes 50  -AV       Total Minutes 50  -AV                User Key  (r) = Recorded By, (t) = Taken By, (c) = Cosigned By      Initials Name Provider Type    AV Andrea Ruiz, PT Physical Therapist                  Therapy Charges for Today       Code Description Service Date Service Provider Modifiers Qty    05550488046 HC PT EVAL LOW COMPLEXITY 4 2/19/2024 Andrea Ruiz, PT GP 1            PT G-Codes  Outcome Measure Options: AM-PAC 6 Clicks Daily Activity (OT), Optimal Instrument  AM-PAC 6 Clicks Score (PT): 11  AM-PAC 6 Clicks Score (OT): 14    Andrea Ruiz, PT  2/19/2024

## 2024-02-19 NOTE — SIGNIFICANT NOTE
Wound Eval / Progress Noted    YAIMA Stockton     Patient Name: Preston Wallis  : 1965  MRN: [id number]  Today's Date: 2024                 Admit Date: 2024    Visit Dx:    ICD-10-CM ICD-9-CM   1. Decreased activities of daily living (ADL)  Z78.9 V49.89   2. Difficulty walking  R26.2 719.7         Septic joint    Polyneuropathy    Type 2 DM with CKD stage 3 and hypertension    Septic arthritis    Skin ulcer of left heel, limited to breakdown of skin    Ulcer of left foot, limited to breakdown of skin    Left foot pain        Past Medical History:   Diagnosis Date    Age-related cognitive decline     Allergic contact dermatitis     Allergies     Anemia     Bronchiectasis with acute lower respiratory infection     Charcot foot due to diabetes mellitus 9/10/2013    Chronic diastolic (congestive) heart failure     Chronic kidney disease     Chronic respiratory failure with hypoxia     Closed supracondylar fracture of femur 2022    COPD (chronic obstructive pulmonary disease)     Deep vein thrombosis (DVT) of lower extremity associated with air travel 2023    Dependence on supplemental oxygen     Eczema     Erectile dysfunction     due to organic reasons    Essential (primary) hypertension     Fracture     closed fracture of other tarsal and metatarsal bones    Fracture of proximal humerus 2023    GERD without esophagitis     High risk medication use     Hypercholesteremia     Hypomagnesemia     Infected stasis ulcer of left lower extremity 2023    Insomnia     Low back pain     Major depressive disorder     Morbid (severe) obesity due to excess calories     MRSA pneumonia     Muscle weakness     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 without evidence of necrosis     Obstructive sleep apnea (adult) (pediatric)     Other forms of dyspnea     Other long term (current) drug therapy     Other specified noninfective gastroenteritis and colitis      Other spondylosis, lumbar region     Pain in both knees     Paroxysmal atrial fibrillation     Peripheral neuropathy     attributed to type 2 diabetes    Pneumonia, unspecified organism     Polyneuropathy     Rash and other nonspecific skin eruption     Syncope and collapse     Tachycardia     Tinnitus 1/13/2023    Type 1 diabetes mellitus with diabetic chronic kidney disease     Type 2 diabetes mellitus     Unspecified fall, initial encounter     Urinary retention         Past Surgical History:   Procedure Laterality Date    CHOLECYSTECTOMY      CYSTOSCOPY      FEMUR SURGERY Left     Shravan placed    KNEE SURGERY Left     OTHER SURGICAL HISTORY Left     venous port    TIBIAL PLATEAU OPEN REDUCTION INTERNAL FIXATION Left 12/22/2023    Procedure: TIBIAL PLATEAU OPEN REDUCTION INTERNAL FIXATION;  Surgeon: Hugo Kline MD;  Location: [facility] OR;  Service: Orthopedics;  Laterality: Left;    TONSILLECTOMY AND ADENOIDECTOMY           Physical Assessment:  Wound Left gluteal (Active)   Wound Image   02/19/24 1648   Dressing Appearance open to air 02/19/24 1225   Closure None 02/19/24 1225   Base blanchable;red;dry 02/19/24 1225   Periwound intact;dry;pink 02/19/24 1225   Periwound Temperature warm 02/19/24 1225   Periwound Skin Turgor soft 02/19/24 1225   Edges open;rolled/closed 02/19/24 1225   Drainage Amount none 02/19/24 1225   Care, Wound cleansed with;sterile normal saline 02/19/24 1225   Dressing Care open to air;skin barrier agent applied 02/19/24 1225   Periwound Care barrier ointment applied 02/19/24 1225       Wound 12/22/23 1322 Left posterior heel Pressure Injury (Active)   Wound Image    02/19/24 1225   Pressure Injury Stage DTPI 02/19/24 1225   Dressing Appearance intact;dry 02/19/24 1225   Closure None 02/19/24 1225   Base non-blanchable;dry;maroon/purple 02/19/24 1225   Periwound intact;dry;blistered;redness 02/19/24 1225   Periwound Temperature warm 02/19/24 1225   Periwound Skin Turgor soft  02/19/24 1225   Edges rolled/closed 02/19/24 1225   Drainage Amount none 02/19/24 1225   Care, Wound cleansed with;sterile normal saline 02/19/24 1225   Dressing Care dressing applied;non-adherent;petroleum-based;gauze;gauze, dry;tubular wrap 02/19/24 1225   Periwound Care absorptive dressing applied 02/19/24 1225       Wound 02/16/24 1700 Left posterior foot Pressure Injury (Active)   Wound Image   02/19/24 1225   Dressing Appearance intact;dry 02/19/24 1225   Closure None 02/19/24 1225   Base black eschar;necrotic;red;moist 02/19/24 1225   Black (%), Wound Tissue Color 50 02/19/24 1225   Red (%), Wound Tissue Color 50 02/19/24 1225   Periwound intact;moist;redness 02/19/24 1225   Periwound Temperature warm 02/19/24 1225   Periwound Skin Turgor soft 02/19/24 1225   Edges open;rolled/closed 02/19/24 1225   Wound Length (cm) 3.1 cm 02/19/24 1225   Wound Width (cm) 6 cm 02/19/24 1225   Wound Surface Area (cm^2) 18.6 cm^2 02/19/24 1225   Drainage Characteristics/Odor serosanguineous 02/19/24 1225   Drainage Amount scant 02/19/24 1225   Care, Wound cleansed with;sterile normal saline 02/19/24 1225   Dressing Care dressing applied;non-adherent;petroleum-based;gauze;gauze, dry;tubular wrap 02/19/24 1225   Periwound Care absorptive dressing applied 02/19/24 1225        Wound Check / Follow-up: Patient seen today for a wound consult. Patient is unable to verbalize when the wounds to the left heel and left plantar foot developed; reports  numbness to the feet. Patient currently using foam boots; will recommend to continue current use to the bilateral heels    DTPI noted to the the left heel with areas of maroon / purple non-blanchable tissue present; blistering also present. Tissue is boggy and intact, with no drainage at this time. Cleansed with NS. Recommending daily dressing changes with non- adherent petroleum gauze to the wound base and secure with gauze and gauze roll.    Ulcerations present to the left fifth  metatarsal with dry stable eschar present; periwound with red moist tissue. Cleansed with NS and gauze. Recommending application of betadine moistened non-adherent petroleum gauze to the wound base and secure with an ABD pad and gauze roll.    Friction and shearing present to the left gluteal aspect with areas of red moist tissue scattered intermittently. Periwound is soft and dry. Recommending TID / PRN application of blue top moisture barrier to the buttocks. Keep the patient clean and free from moisture at all times. Implement Q2H turns and offload at all times for a Dimitrios score of 17    Impression: DTPI to the left heel, ulceration to the left 5th metatarsal; friction and shearing to the left gluteal    Short term goals:  regain skin integrity, pressure reduction, daily dressing changes, topical treatment, moisture prevention    Soco Cardoza RN    2/19/2024    17:33 EST

## 2024-02-19 NOTE — PLAN OF CARE
Goal Outcome Evaluation:  Plan of Care Reviewed With: patient, family        Progress: no change  Outcome Evaluation: Patient presents with deficits in balance, endurance, strength, and transfers. Patient will benefit from skilled PT services to address these mobility deficits and decrease risk of falls.      Anticipated Discharge Disposition (PT): sub acute care setting

## 2024-02-20 ENCOUNTER — APPOINTMENT (OUTPATIENT)
Dept: GENERAL RADIOLOGY | Facility: HOSPITAL | Age: 59
DRG: 871 | End: 2024-02-20
Payer: MEDICAID

## 2024-02-20 LAB
AMMONIA BLD-SCNC: 18 UMOL/L (ref 16–60)
ANION GAP SERPL CALCULATED.3IONS-SCNC: 10.1 MMOL/L (ref 5–15)
ARTERIAL PATENCY WRIST A: POSITIVE
BACTERIA SPEC RESP CULT: NORMAL
BASE EXCESS BLDA CALC-SCNC: 1.8 MMOL/L (ref -2–2)
BASOPHILS # BLD AUTO: 0.15 10*3/MM3 (ref 0–0.2)
BASOPHILS NFR BLD AUTO: 1.5 % (ref 0–1.5)
BDY SITE: ABNORMAL
BUN SERPL-MCNC: 25 MG/DL (ref 6–20)
BUN/CREAT SERPL: 16 (ref 7–25)
CA-I BLDA-SCNC: 1.15 MMOL/L (ref 1.13–1.32)
CALCIUM SPEC-SCNC: 8.7 MG/DL (ref 8.6–10.5)
CHLORIDE BLDA-SCNC: 104 MMOL/L (ref 98–106)
CHLORIDE SERPL-SCNC: 101 MMOL/L (ref 98–107)
CO2 SERPL-SCNC: 25.9 MMOL/L (ref 22–29)
COHGB MFR BLD: 0.3 % (ref 0–1.5)
CREAT SERPL-MCNC: 1.56 MG/DL (ref 0.76–1.27)
DEPRECATED RDW RBC AUTO: 47.3 FL (ref 37–54)
EGFRCR SERPLBLD CKD-EPI 2021: 51.2 ML/MIN/1.73
EOSINOPHIL # BLD AUTO: 0.68 10*3/MM3 (ref 0–0.4)
EOSINOPHIL NFR BLD AUTO: 6.8 % (ref 0.3–6.2)
ERYTHROCYTE [DISTWIDTH] IN BLOOD BY AUTOMATED COUNT: 14.8 % (ref 12.3–15.4)
FHHB: 2.6 % (ref 0–5)
GAS FLOW AIRWAY: 2 LPM
GLUCOSE BLDA-MCNC: 123 MG/DL (ref 70–99)
GLUCOSE BLDC GLUCOMTR-MCNC: 107 MG/DL (ref 70–99)
GLUCOSE BLDC GLUCOMTR-MCNC: 135 MG/DL (ref 70–99)
GLUCOSE BLDC GLUCOMTR-MCNC: 136 MG/DL (ref 70–99)
GLUCOSE BLDC GLUCOMTR-MCNC: 150 MG/DL (ref 70–99)
GLUCOSE SERPL-MCNC: 130 MG/DL (ref 65–99)
GRAM STN SPEC: NORMAL
HCO3 BLDA-SCNC: 27.5 MMOL/L (ref 22–26)
HCT VFR BLD AUTO: 26.6 % (ref 37.5–51)
HGB BLD-MCNC: 7.9 G/DL (ref 13–17.7)
HGB BLDA-MCNC: 9.4 G/DL (ref 13.8–16.4)
IMM GRANULOCYTES # BLD AUTO: 0.07 10*3/MM3 (ref 0–0.05)
IMM GRANULOCYTES NFR BLD AUTO: 0.7 % (ref 0–0.5)
INHALED O2 CONCENTRATION: ABNORMAL %
LACTATE BLDA-SCNC: 0.74 MMOL/L (ref 0.5–2)
LYMPHOCYTES # BLD AUTO: 2.03 10*3/MM3 (ref 0.7–3.1)
LYMPHOCYTES NFR BLD AUTO: 20.3 % (ref 19.6–45.3)
MAGNESIUM SERPL-MCNC: 1.7 MG/DL (ref 1.6–2.6)
MCH RBC QN AUTO: 26.2 PG (ref 26.6–33)
MCHC RBC AUTO-ENTMCNC: 29.7 G/DL (ref 31.5–35.7)
MCV RBC AUTO: 88.1 FL (ref 79–97)
METHGB BLD QL: 0 % (ref 0–1.5)
MODALITY: ABNORMAL
MONOCYTES # BLD AUTO: 1.12 10*3/MM3 (ref 0.1–0.9)
MONOCYTES NFR BLD AUTO: 11.2 % (ref 5–12)
NEUTROPHILS NFR BLD AUTO: 5.95 10*3/MM3 (ref 1.7–7)
NEUTROPHILS NFR BLD AUTO: 59.5 % (ref 42.7–76)
NOTE: ABNORMAL
NRBC BLD AUTO-RTO: 0 /100 WBC (ref 0–0.2)
OXYHGB MFR BLDV: 97.1 % (ref 94–99)
PCO2 BLDA: 48.9 MM HG (ref 35–45)
PH BLDA: 7.37 PH UNITS (ref 7.35–7.45)
PHOSPHATE SERPL-MCNC: 2.8 MG/DL (ref 2.5–4.5)
PLATELET # BLD AUTO: 464 10*3/MM3 (ref 140–450)
PMV BLD AUTO: 8.7 FL (ref 6–12)
PO2 BLD: ABNORMAL MM[HG]
PO2 BLDA: 113.1 MM HG (ref 80–100)
POTASSIUM BLDA-SCNC: 4.32 MMOL/L (ref 3.5–5)
POTASSIUM SERPL-SCNC: 4.3 MMOL/L (ref 3.5–5.2)
RBC # BLD AUTO: 3.02 10*6/MM3 (ref 4.14–5.8)
SAO2 % BLDCOA: 97.4 % (ref 95–99)
SODIUM BLDA-SCNC: 137.3 MMOL/L (ref 136–146)
SODIUM SERPL-SCNC: 137 MMOL/L (ref 136–145)
WBC NRBC COR # BLD AUTO: 10 10*3/MM3 (ref 3.4–10.8)

## 2024-02-20 PROCEDURE — 63710000001 INSULIN DETEMIR PER 5 UNITS: Performed by: INTERNAL MEDICINE

## 2024-02-20 PROCEDURE — 25010000002 CEFEPIME PER 500 MG: Performed by: INTERNAL MEDICINE

## 2024-02-20 PROCEDURE — 82805 BLOOD GASES W/O2 SATURATION: CPT

## 2024-02-20 PROCEDURE — 25010000002 BUMETANIDE PER 0.5 MG: Performed by: INTERNAL MEDICINE

## 2024-02-20 PROCEDURE — 99233 SBSQ HOSP IP/OBS HIGH 50: CPT | Performed by: INTERNAL MEDICINE

## 2024-02-20 PROCEDURE — 94799 UNLISTED PULMONARY SVC/PX: CPT

## 2024-02-20 PROCEDURE — 83050 HGB METHEMOGLOBIN QUAN: CPT

## 2024-02-20 PROCEDURE — 82948 REAGENT STRIP/BLOOD GLUCOSE: CPT

## 2024-02-20 PROCEDURE — 85025 COMPLETE CBC W/AUTO DIFF WBC: CPT

## 2024-02-20 PROCEDURE — 71045 X-RAY EXAM CHEST 1 VIEW: CPT

## 2024-02-20 PROCEDURE — 83605 ASSAY OF LACTIC ACID: CPT

## 2024-02-20 PROCEDURE — 84100 ASSAY OF PHOSPHORUS: CPT | Performed by: INTERNAL MEDICINE

## 2024-02-20 PROCEDURE — 93010 ELECTROCARDIOGRAM REPORT: CPT | Performed by: SPECIALIST

## 2024-02-20 PROCEDURE — 83735 ASSAY OF MAGNESIUM: CPT | Performed by: INTERNAL MEDICINE

## 2024-02-20 PROCEDURE — 25010000002 HYDRALAZINE PER 20 MG: Performed by: FAMILY MEDICINE

## 2024-02-20 PROCEDURE — 94664 DEMO&/EVAL PT USE INHALER: CPT

## 2024-02-20 PROCEDURE — 83735 ASSAY OF MAGNESIUM: CPT

## 2024-02-20 PROCEDURE — 80053 COMPREHEN METABOLIC PANEL: CPT | Performed by: INTERNAL MEDICINE

## 2024-02-20 PROCEDURE — 94761 N-INVAS EAR/PLS OXIMETRY MLT: CPT

## 2024-02-20 PROCEDURE — 82375 ASSAY CARBOXYHB QUANT: CPT

## 2024-02-20 PROCEDURE — 85025 COMPLETE CBC W/AUTO DIFF WBC: CPT | Performed by: INTERNAL MEDICINE

## 2024-02-20 PROCEDURE — 36600 WITHDRAWAL OF ARTERIAL BLOOD: CPT

## 2024-02-20 PROCEDURE — 82140 ASSAY OF AMMONIA: CPT

## 2024-02-20 PROCEDURE — 93005 ELECTROCARDIOGRAM TRACING: CPT

## 2024-02-20 RX ORDER — ATORVASTATIN CALCIUM 20 MG/1
20 TABLET, FILM COATED ORAL NIGHTLY
COMMUNITY
Start: 2024-02-08

## 2024-02-20 RX ORDER — TRAZODONE HYDROCHLORIDE 100 MG/1
100 TABLET ORAL NIGHTLY
COMMUNITY
End: 2024-02-25 | Stop reason: HOSPADM

## 2024-02-20 RX ORDER — GABAPENTIN 300 MG/1
600 CAPSULE ORAL NIGHTLY
COMMUNITY
End: 2024-02-25 | Stop reason: HOSPADM

## 2024-02-20 RX ORDER — METOPROLOL TARTRATE 100 MG/1
100 TABLET ORAL 2 TIMES DAILY
COMMUNITY
Start: 2024-02-08 | End: 2024-02-25 | Stop reason: HOSPADM

## 2024-02-20 RX ORDER — FUROSEMIDE 40 MG/1
40 TABLET ORAL DAILY
COMMUNITY
Start: 2024-01-06 | End: 2024-02-25 | Stop reason: HOSPADM

## 2024-02-20 RX ORDER — GABAPENTIN 300 MG/1
300 CAPSULE ORAL 2 TIMES DAILY
COMMUNITY
End: 2024-02-25 | Stop reason: HOSPADM

## 2024-02-20 RX ORDER — DIAZEPAM 5 MG/ML
2.5 INJECTION, SOLUTION INTRAMUSCULAR; INTRAVENOUS ONCE AS NEEDED
Status: COMPLETED | OUTPATIENT
Start: 2024-02-20 | End: 2024-02-21

## 2024-02-20 RX ORDER — ONDANSETRON 4 MG/1
4 TABLET, FILM COATED ORAL EVERY 8 HOURS PRN
COMMUNITY
Start: 2023-12-28

## 2024-02-20 RX ORDER — DOCUSATE SODIUM 100 MG/1
100 CAPSULE, LIQUID FILLED ORAL 2 TIMES DAILY PRN
COMMUNITY

## 2024-02-20 RX ORDER — MULTIVIT/IRON SULF/FOLIC ACID 15MG-0.4MG
1 TABLET ORAL DAILY
COMMUNITY

## 2024-02-20 RX ORDER — GINSENG 100 MG
1 CAPSULE ORAL DAILY
COMMUNITY

## 2024-02-20 RX ORDER — ASCORBIC ACID 500 MG
500 TABLET ORAL 2 TIMES DAILY
COMMUNITY
Start: 2024-02-08

## 2024-02-20 RX ORDER — DIPHENHYDRAMINE HYDROCHLORIDE 50 MG/ML
25 INJECTION INTRAMUSCULAR; INTRAVENOUS EVERY 6 HOURS PRN
Status: DISCONTINUED | OUTPATIENT
Start: 2024-02-20 | End: 2024-02-20

## 2024-02-20 RX ORDER — DIPHENHYDRAMINE HYDROCHLORIDE 50 MG/ML
25 INJECTION INTRAMUSCULAR; INTRAVENOUS ONCE
Status: DISCONTINUED | OUTPATIENT
Start: 2024-02-21 | End: 2024-02-20

## 2024-02-20 RX ADMIN — CEFEPIME 2000 MG: 2 INJECTION, POWDER, FOR SOLUTION INTRAVENOUS at 12:33

## 2024-02-20 RX ADMIN — INSULIN DETEMIR 30 UNITS: 100 INJECTION, SOLUTION SUBCUTANEOUS at 09:44

## 2024-02-20 RX ADMIN — BUDESONIDE 0.5 MG: 0.5 INHALANT ORAL at 06:32

## 2024-02-20 RX ADMIN — IPRATROPIUM BROMIDE AND ALBUTEROL SULFATE 3 ML: .5; 3 SOLUTION RESPIRATORY (INHALATION) at 11:07

## 2024-02-20 RX ADMIN — Medication 400 MG: at 09:44

## 2024-02-20 RX ADMIN — BUDESONIDE 0.5 MG: 0.5 INHALANT ORAL at 19:04

## 2024-02-20 RX ADMIN — FOLIC ACID 1 MG: 1 TABLET ORAL at 09:44

## 2024-02-20 RX ADMIN — HYDROXYZINE HYDROCHLORIDE 25 MG: 25 TABLET, FILM COATED ORAL at 09:44

## 2024-02-20 RX ADMIN — ARFORMOTEROL TARTRATE 15 MCG: 15 SOLUTION RESPIRATORY (INHALATION) at 19:04

## 2024-02-20 RX ADMIN — IPRATROPIUM BROMIDE AND ALBUTEROL SULFATE 3 ML: .5; 3 SOLUTION RESPIRATORY (INHALATION) at 19:04

## 2024-02-20 RX ADMIN — IPRATROPIUM BROMIDE AND ALBUTEROL SULFATE 3 ML: .5; 3 SOLUTION RESPIRATORY (INHALATION) at 01:17

## 2024-02-20 RX ADMIN — CARVEDILOL 25 MG: 25 TABLET, FILM COATED ORAL at 09:44

## 2024-02-20 RX ADMIN — DULOXETINE HYDROCHLORIDE 30 MG: 30 CAPSULE, DELAYED RELEASE ORAL at 09:44

## 2024-02-20 RX ADMIN — HYDROXYZINE HYDROCHLORIDE 25 MG: 25 TABLET, FILM COATED ORAL at 17:14

## 2024-02-20 RX ADMIN — BUMETANIDE 2 MG: 0.25 INJECTION INTRAMUSCULAR; INTRAVENOUS at 12:33

## 2024-02-20 RX ADMIN — CETIRIZINE HYDROCHLORIDE 10 MG: 10 TABLET, FILM COATED ORAL at 09:44

## 2024-02-20 RX ADMIN — COLLAGENASE SANTYL 1 APPLICATION: 250 OINTMENT TOPICAL at 09:45

## 2024-02-20 RX ADMIN — GUAIFENESIN 600 MG: 600 TABLET ORAL at 09:44

## 2024-02-20 RX ADMIN — ALBUTEROL SULFATE 2.5 MG: 2.5 SOLUTION RESPIRATORY (INHALATION) at 09:14

## 2024-02-20 RX ADMIN — HYDRALAZINE HYDROCHLORIDE 10 MG: 20 INJECTION, SOLUTION INTRAMUSCULAR; INTRAVENOUS at 22:33

## 2024-02-20 RX ADMIN — PSYLLIUM HUSK 1 PACKET: 3.4 POWDER ORAL at 09:44

## 2024-02-20 RX ADMIN — ARFORMOTEROL TARTRATE 15 MCG: 15 SOLUTION RESPIRATORY (INHALATION) at 06:32

## 2024-02-20 RX ADMIN — Medication 250 MG: at 09:44

## 2024-02-20 RX ADMIN — DOCUSATE SODIUM 50MG AND SENNOSIDES 8.6MG 2 TABLET: 8.6; 5 TABLET, FILM COATED ORAL at 09:44

## 2024-02-20 RX ADMIN — APIXABAN 5 MG: 5 TABLET, FILM COATED ORAL at 09:44

## 2024-02-20 RX ADMIN — IPRATROPIUM BROMIDE AND ALBUTEROL SULFATE 3 ML: .5; 3 SOLUTION RESPIRATORY (INHALATION) at 06:32

## 2024-02-20 RX ADMIN — DOXYCYCLINE 100 MG: 100 CAPSULE ORAL at 12:32

## 2024-02-20 RX ADMIN — SODIUM HYPOCHLORITE: 1.25 SOLUTION TOPICAL at 09:44

## 2024-02-20 NOTE — PLAN OF CARE
Goal Outcome Evaluation:      Pt confused throughout shift and lethargic. Pt refusing bipap. Pt refusing meals. Pt refuses turns. Port reaccessed by IV resource RN. Wound care completed per order.  Adeola Haines RN

## 2024-02-20 NOTE — PROGRESS NOTES
[facility]   Hospitalist Progress Note    Date of admission: 2/16/2024  Patient Name: Preston Wallis  1965  Date: 2/20/2024      Subjective     No chief complaint on file.    Interval Followup:  Patient continues to refuse to wear his BiPAP machine.  Patient remains lethargic and confused  Patient also does not want to wear his home BiPAP machine  Patient is not wanting to talk much today he is upset when being told he needs to wear his BiPAP machine  He has wheezing today.     Vitals:   Temp:  [97.5 °F (36.4 °C)-98.4 °F (36.9 °C)] 98.4 °F (36.9 °C)  Heart Rate:  [76-84] 79  Resp:  [16-20] 16  BP: (126-170)/(64-80) 142/80  Flow (L/min):  [2] 2    Physical Exam  Chronic ill-appearing morbidly obese in bed   HEENT: NC/AT  Resp: Bilateral rhonchi and wheezing  CV: RRRAbdomen soft obese, has some petechiae from where it appears he has been scratching on his chest/abdomen, no obvious superficial skin rash  Left lower extremity wound dressed no discharge or drainage, dry skin on left leg    Result Review:  Vital signs, labs and recent relevant imaging reviewed.        acetaminophen    albuterol    aluminum-magnesium hydroxide-simethicone    benzonatate    senna-docusate sodium **AND** polyethylene glycol **AND** bisacodyl **AND** bisacodyl    dextrose    dextrose    Diclofenac Sodium    gabapentin    glucagon (human recombinant)    guaiFENesin-dextromethorphan    hydrALAZINE    hydrOXYzine    Lidocaine    melatonin    nicotine    ondansetron    Pharmacy Consult - Pharmacy to dose    prochlorperazine    sodium chloride    sodium chloride    sodium chloride    apixaban, 5 mg, Oral, Q12H  arformoterol, 15 mcg, Nebulization, BID - RT  budesonide, 0.5 mg, Nebulization, BID - RT  bumetanide, 2 mg, Intravenous, Daily  carvedilol, 25 mg, Oral, Q12H  cefepime, 2,000 mg, Intravenous, Q8H  cetirizine, 10 mg, Oral, Daily  collagenase, 1 Application, Topical, Q24H  doxycycline, 100 mg, Oral, Q12H  DULoxetine, 30 mg, Oral,  Daily  [Held by provider] ferrous sulfate, 325 mg, Oral, Daily With Breakfast  folic acid, 1 mg, Oral, Daily  guaiFENesin, 600 mg, Oral, Q12H  hydrOXYzine, 25 mg, Oral, TID  insulin detemir, 30 Units, Subcutaneous, BID  insulin lispro, 4-24 Units, Subcutaneous, 4x Daily AC & at Bedtime  ipratropium-albuterol, 3 mL, Nebulization, 4x Daily - RT  [Held by provider] losartan, 25 mg, Oral, Q24H  magnesium oxide, 400 mg, Oral, BID  montelukast, 10 mg, Oral, Nightly  NIFEdipine XL, 30 mg, Oral, QAM  pantoprazole, 40 mg, Oral, Q AM  psyllium, 1 packet, Oral, Daily  saccharomyces boulardii, 250 mg, Oral, BID  senna-docusate sodium, 2 tablet, Oral, BID  sodium chloride, 10 mL, Intravenous, Q12H  Sodium Hypochlorite, , Topical, Q12H        XR Chest 1 View    Result Date: 2/20/2024   Stable appearance of the chest allowing for difficulties with positioning.       MIAH GREEN MD       Electronically Signed and Approved By: MIAH GREEN MD on 2/20/2024 at 5:55             CT Head Without Contrast    Result Date: 2/17/2024   Motion degraded exam with no definite acute abnormalities.     MIAH GREEN MD       Electronically Signed and Approved By: MIAH GREEN MD on 2/17/2024 at 1:21             XR Chest 1 View    Result Date: 2/17/2024   No significant interval change.       MIAH GREEN MD       Electronically Signed and Approved By: MIAH GREEN MD on 2/17/2024 at 1:01             XR Chest 1 View    Result Date: 2/16/2024    1. There is borderline heart size with pulmonary vascular congestion 2. Chronic bilateral airspace disease       WHIT HARDIN MD       Electronically Signed and Approved By: WHIT HARDIN MD on 2/16/2024 at 16:52             XR chest AP portable    Result Date: 2/16/2024  No significant interval change. Images personally reviewed, interpreted and dictated by SHANT Velasquez.          MRI foot left with and without contrast    Result Date: 2/14/2024  1. Soft tissue ulceration along the  plantar/lateral aspect of the forefoot adjacent to the fifth MTP joint with adjacent cellulitis. No abnormal fluid collections to suggest abscess. 2. There is bone marrow edema in the fifth digit proximal phalanx and distal fifth metatarsal and a trace amount of joint effusion. Findings are concerning for septic arthritis with reactive marrow edema. No definite osseous destructive changes/abnormal bone marrow signal changes to suggest osteomyelitis. 3. Mild multijoint degenerative changes of the remaining MTP joints with a trace amount of joint effusion, and without abnormal MRI signal changes or enhancement, likely representing degenerative effusions. Images personally reviewed, interpreted and dictated by SHANT Velasquez.     2/2/24 echo from OSH  Normal LV wall motion with estimated LV ejection fraction 50 to 55%   Mild concentric LVH   Normal valvular structure and function       Assessment / Plan     Summary: 58-year-old male morbidly obese COPD on 2 L baseline, CKD 3/4, MILADY not adherent to BiPAP, chronic nursing home resident who presented to the outside hospital on 2/11 with pneumonia.  Patient was recently admitted to [place] with tibial plateau fracture.  Transferred here for further evaluation concerned that he may need podiatry intervention for foot infection.  Podiatry evaluated no acute intervention required apart from local wound care and some superficial debridement.  Patient with acute hypoxemic hypercapnic respiratory failure requiring continuous BiPAP initially has issues with nonadherence to this as outpatient.  Pulmonology and podiatry assisting.  Treated with IV antibiotics for foot infection and pneumonia.  Rehab versus home health for discharge.  Hopefully in the next 2 days.    Assessment/Plan (clinically significant if listed here)  Sepsis secondary to Pneumonia, bacterial  Acute hypoxemic hypercapnic respiratory failure  MILADY noncompliant with home bipap  Acute metabolic  encephalopathy in setting of above  Diabetic foot Infection/SSTI, unspecified organism   Diastolic CHF with exacerbation 2/2024 from outside hospital EF 50%  CKD3/4 baseline creatinine ~1.4-1.7  Paroxysmal Afib on apixaban  CAD  COPD with exacerbation, baseline 2 L oxygen  Hx of DVT  IDDM2  Iron deficiency anemia   BPH  Hx of neurogenic bladder with chronic lopez  Chronic NH resident  Morbid Obesity  Polypharmacy     PLAN  Patient's white blood cell count is back to normal.  Patient remains on cefepime and vancomycin has been transitioned over to doxycycline.  Will reach out to infectious disease to discuss antibiotic regimen  Cultures here are unremarkable for specific organism  Continue brov/pulm, scheduled nebs respiratory hygiene  Monitor sedation, continuous pulse ox, if pt has increasing lethargy need to be placed back on BiPAP, suspect episodes of increased confusion related to hypercarbia, doing better with continued adherence to bipap regimen. However patient is refusing to use it   Appreciate podiatry assistance, no additional surgical invention need further exam  Discussed with pulmonology appreciate assistance, monitoring BiPAP and continue respiratory hygiene management as above  Iv bumex  Continue Coreg, nifedipine  use lidocaine prn if needed, have held gabapentin without acute neuropathy complaints  Continue insulin monitor  Wound care to LE   Off flomax/bph meds as pt with chronic catheter   PT/OT  Check a.m. CBC, BMP, magnesium, phosphorus  Continue hospitalization at current level of care, telemetry,   Dispo: rehab placement       DVT prophylaxis:  Medical DVT prophylaxis orders are present.        Code Status (Patient has no pulse and is not breathing): CPR (Attempt to Resuscitate)  Medical Interventions (Patient has pulse or is breathing): Full Support        CBC          2/18/2024    06:00 2/18/2024    16:32 2/19/2024    05:59 2/20/2024    04:48   CBC   WBC 12.53   10.72  10.00    RBC 2.68    2.90  3.02    Hemoglobin 7.1  7.5  7.7  7.9    Hematocrit 24.1  24.7  25.7  26.6    MCV 89.9   88.6  88.1    MCH 26.5   26.6  26.2    MCHC 29.5   30.0  29.7    RDW 14.7   14.6  14.8    Platelets 454   487  464        CMP          2/18/2024    06:00 2/19/2024    05:59 2/19/2024    22:36 2/20/2024    04:26 2/20/2024    04:48   CMP   Glucose 130  162  153  123  130    BUN 30  27    25    Creatinine 1.76  1.73    1.56    EGFR 44.3  45.2    51.2    Sodium 138  135  136.2  137.3  137    Potassium 4.4  4.6    4.3    Chloride 102  99    101    Calcium 8.5  8.8    8.7    BUN/Creatinine Ratio 17.0  15.6    16.0    Anion Gap 8.1  9.2    10.1

## 2024-02-20 NOTE — PLAN OF CARE
Goal Outcome Evaluation:      Patient with increased confusion and lethargy overnight, ABG obtained x2, chest xray obtained, patient refusing bipap. MD aware of results and bipap refusal. VSS, no complaints of pain, dressing to foot C/D/I. Lin Rodriguez RN

## 2024-02-20 NOTE — PROGRESS NOTES
Pulmonary / Critical Care Progress Note      Patient Name: Preston Wallis  : 1965  MRN: [id number]  Primary Care Physician:  Kimmy Riley MD  Date of admission: 2024    Subjective   Subjective   Follow-up for hypercapnic and hypoxemic respiratory failure requiring NIPPV    Doing well on home NIPPV overnight a.m. 2 L of oxygen  Dyspnea improved  Denies chest tightness or cough  No fevers or chills  Diuresing well  ABG 7.3 /      Objective   Objective     Vitals:   Temp:  [97.5 °F (36.4 °C)-98.4 °F (36.9 °C)] 98.4 °F (36.9 °C)  Heart Rate:  [76-84] 79  Resp:  [16-20] 16  BP: (126-170)/(67-80) 142/80  Flow (L/min):  [2] 2  Physical Exam   Vital Signs Reviewed   General:  WDWN, Alert, NAD.  Chronically ill-appearing male lying in bed  HEENT:  PERRL, EOMI.  OP, nares clear  Chest:  good aeration, clear to auscultation bilaterally, no work of breathing noted on 2 L  CV: RRR, no MGR, pulses 2+, equal.  Abd:  Soft, NT, ND, + BS, no HSM, obese  EXT:  no clubbing, no cyanosis, no edema  Neuro:  A&Ox3, CN grossly intact, no focal deficits.  Skin: No rashes or lesions noted    Result Review    Result Review:  I have personally reviewed the results from the time of this admission to 2024 15:17 EST and agree with these findings:  [x]  Laboratory  [x]  Microbiology  [x]  Radiology  [x]  EKG/Telemetry   [x]  Cardiology/Vascular   []  Pathology  []  Old records  []  Other:  Most notable findings include:         Lab 24  0448 24  0426 24  2236 24  0559 24  1632 24  0600 24  0117 24  0044 24  1707   WBC 10.00  --   --  10.72  --  12.53* 13.16*  --  14.84*   HEMOGLOBIN 7.9*  --   --  7.7* 7.5* 7.1* 8.1*  --  8.3*   HEMATOCRIT 26.6*  --   --  25.7* 24.7* 24.1* 26.7*  --  28.1*   PLATELETS 464*  --   --  487*  --  454* 455*  --  491*   SODIUM 137  --   --  135*  --  138 139  --  138   SODIUM, ARTERIAL  --  137.3 136.2  --   --   --   --  138.2  --     POTASSIUM 4.3  --   --  4.6  --  4.4 4.3  --  5.2   CHLORIDE 101  --   --  99  --  102 102  --  102   CO2 25.9  --   --  26.8  --  27.9 25.3  --  27.8   BUN 25*  --   --  27*  --  30* 31*  --  29*   CREATININE 1.56*  --   --  1.73*  --  1.76* 1.74*  --  1.78*   GLUCOSE 130*  --   --  162*  --  130* 268*  --  281*   GLUCOSE, ARTERIAL  --  123* 153*  --   --   --   --  249*  --    CALCIUM 8.7  --   --  8.8  --  8.5* 8.8  --  8.8   PHOSPHORUS 2.8  --   --  2.8  --  3.0 2.3*  --  2.9   TOTAL PROTEIN  --   --   --   --   --   --   --   --  8.3   ALBUMIN  --   --   --   --   --   --   --   --  2.8*   GLOBULIN  --   --   --   --   --   --   --   --  5.5       Assessment & Plan   Assessment / Plan     Active Hospital Problems:  Active Hospital Problems    Diagnosis     **Septic joint     Septic arthritis     Skin ulcer of left heel, limited to breakdown of skin     Ulcer of left foot, limited to breakdown of skin     Left foot pain     Type 2 DM with CKD stage 3 and hypertension     Polyneuropathy      Impression:   Acute on chronic hypoxemic and hypercapnic respiratory failure  Acute on chronic COPD exacerbation, FEV1 of 26%  CO2 narcosis respiratory  Acidosis  Elevated BNP, 2590  ERIC on CKD  History of recurrent Pseudomonas infection with MDRO Pseudomonas  History of PE in July 2019  Tobacco abuse in remission     Plan:   -On home oxygen of 2 L/min  -Continue home NIPPV nightly with naps on current settings  -Continue Bumex 2 mg IV daily  -Continue to monitor renal panel and electrolytes.   -Continue bronchopulmonary hygiene.  Encourage I-S and flutter  -Continue Brovana, Pulmicort, and DuoNebs  -Encourage mobilization.  Out of bed to chair  -Continue Eliquis for A-fib  -Antibiotics per primary  -Rest of care per primary     DVT prophylaxis:  Medical DVT prophylaxis orders are present.    CODE STATUS:   Code Status (Patient has no pulse and is not breathing): CPR (Attempt to Resuscitate)  Medical Interventions (Patient  has pulse or is breathing): Full Support      Labs, imaging, microbiology, notes and medications personally reviewed  Discussed with primary    I, Dr. Severiano Carolina, have spent more than 50% of the total time managing the patient in this encounter today.  This included personally reviewing all pertinent labs, imaging, microbiology and documentation. Also discussing the case with the patient and any available family, the admitting physician and any available ancillary staff.    Electronically signed by Severiano Carolina MD, 02/20/24, 3:18 PM EST.

## 2024-02-20 NOTE — SIGNIFICANT NOTE
Called to assess port that would not flush.  It appears needle has dislodged, resistance met when flushing, no blood return.  Port re-accesed, now has brisk blood return and flushes easily.

## 2024-02-21 ENCOUNTER — APPOINTMENT (OUTPATIENT)
Dept: GENERAL RADIOLOGY | Facility: HOSPITAL | Age: 59
DRG: 871 | End: 2024-02-21
Payer: MEDICAID

## 2024-02-21 ENCOUNTER — APPOINTMENT (OUTPATIENT)
Dept: CT IMAGING | Facility: HOSPITAL | Age: 59
DRG: 871 | End: 2024-02-21
Payer: MEDICAID

## 2024-02-21 LAB
ALBUMIN SERPL-MCNC: 2.8 G/DL (ref 3.5–5.2)
ALBUMIN/GLOB SERPL: 0.6 G/DL
ALP SERPL-CCNC: 174 U/L (ref 39–117)
ALT SERPL W P-5'-P-CCNC: 14 U/L (ref 1–41)
ANION GAP SERPL CALCULATED.3IONS-SCNC: 10.5 MMOL/L (ref 5–15)
ANION GAP SERPL CALCULATED.3IONS-SCNC: 11.3 MMOL/L (ref 5–15)
AST SERPL-CCNC: 18 U/L (ref 1–40)
BACTERIA SPEC AEROBE CULT: NORMAL
BACTERIA SPEC AEROBE CULT: NORMAL
BASE EXCESS BLDA CALC-SCNC: 1.2 MMOL/L (ref -2–2)
BASOPHILS # BLD AUTO: 0.12 10*3/MM3 (ref 0–0.2)
BASOPHILS NFR BLD AUTO: 1.2 % (ref 0–1.5)
BDY SITE: ABNORMAL
BILIRUB SERPL-MCNC: 0.2 MG/DL (ref 0–1.2)
BUN SERPL-MCNC: 23 MG/DL (ref 6–20)
BUN SERPL-MCNC: 25 MG/DL (ref 6–20)
BUN/CREAT SERPL: 15.8 (ref 7–25)
BUN/CREAT SERPL: 16.2 (ref 7–25)
CALCIUM SPEC-SCNC: 8.7 MG/DL (ref 8.6–10.5)
CALCIUM SPEC-SCNC: 8.8 MG/DL (ref 8.6–10.5)
CHLORIDE SERPL-SCNC: 101 MMOL/L (ref 98–107)
CHLORIDE SERPL-SCNC: 105 MMOL/L (ref 98–107)
CO2 SERPL-SCNC: 23.7 MMOL/L (ref 22–29)
CO2 SERPL-SCNC: 25.5 MMOL/L (ref 22–29)
COHGB MFR BLD: 0.3 % (ref 0–1.5)
CREAT SERPL-MCNC: 1.46 MG/DL (ref 0.76–1.27)
CREAT SERPL-MCNC: 1.54 MG/DL (ref 0.76–1.27)
D-LACTATE SERPL-SCNC: 0.7 MMOL/L (ref 0.5–2)
DEPRECATED RDW RBC AUTO: 47.5 FL (ref 37–54)
DEPRECATED RDW RBC AUTO: 49.5 FL (ref 37–54)
EGFRCR SERPLBLD CKD-EPI 2021: 52 ML/MIN/1.73
EGFRCR SERPLBLD CKD-EPI 2021: 55.4 ML/MIN/1.73
EOSINOPHIL # BLD AUTO: 0.71 10*3/MM3 (ref 0–0.4)
EOSINOPHIL NFR BLD AUTO: 7 % (ref 0.3–6.2)
ERYTHROCYTE [DISTWIDTH] IN BLOOD BY AUTOMATED COUNT: 15 % (ref 12.3–15.4)
ERYTHROCYTE [DISTWIDTH] IN BLOOD BY AUTOMATED COUNT: 15.9 % (ref 12.3–15.4)
FHHB: 6.5 % (ref 0–5)
GLOBULIN UR ELPH-MCNC: 5 GM/DL
GLUCOSE BLDC GLUCOMTR-MCNC: 100 MG/DL (ref 70–99)
GLUCOSE BLDC GLUCOMTR-MCNC: 103 MG/DL (ref 70–99)
GLUCOSE BLDC GLUCOMTR-MCNC: 90 MG/DL (ref 70–99)
GLUCOSE BLDC GLUCOMTR-MCNC: 92 MG/DL (ref 70–99)
GLUCOSE BLDC GLUCOMTR-MCNC: 98 MG/DL (ref 70–99)
GLUCOSE SERPL-MCNC: 100 MG/DL (ref 65–99)
GLUCOSE SERPL-MCNC: 101 MG/DL (ref 65–99)
HCO3 BLDA-SCNC: 25.3 MMOL/L (ref 22–26)
HCT VFR BLD AUTO: 27.1 % (ref 37.5–51)
HCT VFR BLD AUTO: 28.6 % (ref 37.5–51)
HGB BLD-MCNC: 8.3 G/DL (ref 13–17.7)
HGB BLD-MCNC: 8.6 G/DL (ref 13–17.7)
HGB BLDA-MCNC: 9.5 G/DL (ref 13.8–16.4)
IMM GRANULOCYTES # BLD AUTO: 0.07 10*3/MM3 (ref 0–0.05)
IMM GRANULOCYTES NFR BLD AUTO: 0.7 % (ref 0–0.5)
INHALED O2 CONCENTRATION: 21 %
LYMPHOCYTES # BLD AUTO: 2.24 10*3/MM3 (ref 0.7–3.1)
LYMPHOCYTES NFR BLD AUTO: 21.9 % (ref 19.6–45.3)
MAGNESIUM SERPL-MCNC: 1.7 MG/DL (ref 1.6–2.6)
MCH RBC QN AUTO: 26.4 PG (ref 26.6–33)
MCH RBC QN AUTO: 26.9 PG (ref 26.6–33)
MCHC RBC AUTO-ENTMCNC: 30.1 G/DL (ref 31.5–35.7)
MCHC RBC AUTO-ENTMCNC: 30.6 G/DL (ref 31.5–35.7)
MCV RBC AUTO: 87.7 FL (ref 79–97)
MCV RBC AUTO: 88 FL (ref 79–97)
METHGB BLD QL: 0.1 % (ref 0–1.5)
MODALITY: ABNORMAL
MONOCYTES # BLD AUTO: 0.97 10*3/MM3 (ref 0.1–0.9)
MONOCYTES NFR BLD AUTO: 9.5 % (ref 5–12)
NEUTROPHILS NFR BLD AUTO: 59.7 % (ref 42.7–76)
NEUTROPHILS NFR BLD AUTO: 6.1 10*3/MM3 (ref 1.7–7)
NRBC BLD AUTO-RTO: 0 /100 WBC (ref 0–0.2)
OXYHGB MFR BLDV: 93.1 % (ref 94–99)
PCO2 BLDA: 38.1 MM HG (ref 35–45)
PH BLDA: 7.44 PH UNITS (ref 7.35–7.45)
PHOSPHATE SERPL-MCNC: 2.6 MG/DL (ref 2.5–4.5)
PLATELET # BLD AUTO: 469 10*3/MM3 (ref 140–450)
PLATELET # BLD AUTO: 488 10*3/MM3 (ref 140–450)
PMV BLD AUTO: 8.7 FL (ref 6–12)
PMV BLD AUTO: 8.8 FL (ref 6–12)
PO2 BLD: 326 MM[HG] (ref 0–500)
PO2 BLDA: 68.5 MM HG (ref 80–100)
POTASSIUM SERPL-SCNC: 4.1 MMOL/L (ref 3.5–5.2)
POTASSIUM SERPL-SCNC: 4.4 MMOL/L (ref 3.5–5.2)
PROT SERPL-MCNC: 7.8 G/DL (ref 6–8.5)
QT INTERVAL: 420 MS
QTC INTERVAL: 491 MS
RBC # BLD AUTO: 3.08 10*6/MM3 (ref 4.14–5.8)
RBC # BLD AUTO: 3.26 10*6/MM3 (ref 4.14–5.8)
SAO2 % BLDCOA: 93.5 % (ref 95–99)
SODIUM SERPL-SCNC: 137 MMOL/L (ref 136–145)
SODIUM SERPL-SCNC: 140 MMOL/L (ref 136–145)
WBC NRBC COR # BLD AUTO: 10.21 10*3/MM3 (ref 3.4–10.8)
WBC NRBC COR # BLD AUTO: 11.95 10*3/MM3 (ref 3.4–10.8)

## 2024-02-21 PROCEDURE — 25010000002 MIDAZOLAM PER 1MG

## 2024-02-21 PROCEDURE — 82948 REAGENT STRIP/BLOOD GLUCOSE: CPT

## 2024-02-21 PROCEDURE — 94799 UNLISTED PULMONARY SVC/PX: CPT

## 2024-02-21 PROCEDURE — 83050 HGB METHEMOGLOBIN QUAN: CPT | Performed by: INTERNAL MEDICINE

## 2024-02-21 PROCEDURE — 25010000002 HALOPERIDOL LACTATE PER 5 MG: Performed by: FAMILY MEDICINE

## 2024-02-21 PROCEDURE — 82375 ASSAY CARBOXYHB QUANT: CPT | Performed by: INTERNAL MEDICINE

## 2024-02-21 PROCEDURE — 85027 COMPLETE CBC AUTOMATED: CPT | Performed by: INTERNAL MEDICINE

## 2024-02-21 PROCEDURE — 94664 DEMO&/EVAL PT USE INHALER: CPT

## 2024-02-21 PROCEDURE — 25010000002 HYDRALAZINE PER 20 MG: Performed by: FAMILY MEDICINE

## 2024-02-21 PROCEDURE — 82805 BLOOD GASES W/O2 SATURATION: CPT | Performed by: INTERNAL MEDICINE

## 2024-02-21 PROCEDURE — 94761 N-INVAS EAR/PLS OXIMETRY MLT: CPT

## 2024-02-21 PROCEDURE — 99233 SBSQ HOSP IP/OBS HIGH 50: CPT | Performed by: INTERNAL MEDICINE

## 2024-02-21 PROCEDURE — 80048 BASIC METABOLIC PNL TOTAL CA: CPT | Performed by: INTERNAL MEDICINE

## 2024-02-21 PROCEDURE — 36600 WITHDRAWAL OF ARTERIAL BLOOD: CPT | Performed by: INTERNAL MEDICINE

## 2024-02-21 PROCEDURE — 71045 X-RAY EXAM CHEST 1 VIEW: CPT

## 2024-02-21 PROCEDURE — 25010000002 DIAZEPAM PER 5 MG

## 2024-02-21 PROCEDURE — 70450 CT HEAD/BRAIN W/O DYE: CPT

## 2024-02-21 PROCEDURE — 82948 REAGENT STRIP/BLOOD GLUCOSE: CPT | Performed by: INTERNAL MEDICINE

## 2024-02-21 RX ORDER — HALOPERIDOL 5 MG/ML
5 INJECTION INTRAMUSCULAR ONCE
Status: COMPLETED | OUTPATIENT
Start: 2024-02-21 | End: 2024-02-21

## 2024-02-21 RX ORDER — DIAZEPAM 5 MG/ML
INJECTION, SOLUTION INTRAMUSCULAR; INTRAVENOUS
Status: DISPENSED
Start: 2024-02-21 | End: 2024-02-21

## 2024-02-21 RX ORDER — MIDAZOLAM HYDROCHLORIDE 2 MG/2ML
2 INJECTION, SOLUTION INTRAMUSCULAR; INTRAVENOUS ONCE
Status: COMPLETED | OUTPATIENT
Start: 2024-02-21 | End: 2024-02-21

## 2024-02-21 RX ORDER — DIAZEPAM 5 MG/ML
2.5 INJECTION, SOLUTION INTRAMUSCULAR; INTRAVENOUS ONCE AS NEEDED
Status: COMPLETED | OUTPATIENT
Start: 2024-02-21 | End: 2024-02-21

## 2024-02-21 RX ORDER — DIAZEPAM 5 MG/ML
5 INJECTION, SOLUTION INTRAMUSCULAR; INTRAVENOUS ONCE
Status: DISCONTINUED | OUTPATIENT
Start: 2024-02-21 | End: 2024-02-21

## 2024-02-21 RX ORDER — DIAZEPAM 5 MG/ML
2.5 INJECTION, SOLUTION INTRAMUSCULAR; INTRAVENOUS ONCE AS NEEDED
Status: DISCONTINUED | OUTPATIENT
Start: 2024-02-21 | End: 2024-02-21

## 2024-02-21 RX ADMIN — ARFORMOTEROL TARTRATE 15 MCG: 15 SOLUTION RESPIRATORY (INHALATION) at 18:35

## 2024-02-21 RX ADMIN — APIXABAN 5 MG: 5 TABLET, FILM COATED ORAL at 21:10

## 2024-02-21 RX ADMIN — BUDESONIDE 0.5 MG: 0.5 INHALANT ORAL at 18:35

## 2024-02-21 RX ADMIN — DIAZEPAM 2.5 MG: 5 INJECTION, SOLUTION INTRAMUSCULAR; INTRAVENOUS at 00:56

## 2024-02-21 RX ADMIN — BUDESONIDE 0.5 MG: 0.5 INHALANT ORAL at 06:40

## 2024-02-21 RX ADMIN — HYDRALAZINE HYDROCHLORIDE 10 MG: 20 INJECTION, SOLUTION INTRAMUSCULAR; INTRAVENOUS at 04:59

## 2024-02-21 RX ADMIN — IPRATROPIUM BROMIDE AND ALBUTEROL SULFATE 3 ML: .5; 3 SOLUTION RESPIRATORY (INHALATION) at 12:08

## 2024-02-21 RX ADMIN — MIDAZOLAM HYDROCHLORIDE 2 MG: 1 INJECTION, SOLUTION INTRAMUSCULAR; INTRAVENOUS at 03:00

## 2024-02-21 RX ADMIN — HALOPERIDOL LACTATE 5 MG: 5 INJECTION, SOLUTION INTRAMUSCULAR at 02:15

## 2024-02-21 RX ADMIN — IPRATROPIUM BROMIDE AND ALBUTEROL SULFATE 3 ML: .5; 3 SOLUTION RESPIRATORY (INHALATION) at 06:40

## 2024-02-21 RX ADMIN — DIAZEPAM 2.5 MG: 5 INJECTION, SOLUTION INTRAMUSCULAR; INTRAVENOUS at 00:00

## 2024-02-21 RX ADMIN — ARFORMOTEROL TARTRATE 15 MCG: 15 SOLUTION RESPIRATORY (INHALATION) at 06:40

## 2024-02-21 RX ADMIN — IPRATROPIUM BROMIDE AND ALBUTEROL SULFATE 3 ML: .5; 3 SOLUTION RESPIRATORY (INHALATION) at 18:35

## 2024-02-21 NOTE — PROGRESS NOTES
Pulmonary / Critical Care Progress Note      Patient Name: Preston Wallis  : 1965  MRN: [id number]  Primary Care Physician:  Kimmy Riley MD  Date of admission: 2024    Subjective   Subjective   Follow-up for hypercapnic and hypoxemic respiratory failure requiring NIPPV    Having issues with some agitation overnight so received quite a bit of benzodiazepines from overnight service  Now very agitated and throwing off BiPAP  Very confused  ABG checked and does not have any acute CO2 retention at this time  Chest x-ray overnight checked and overall unchanged  Cannot answer questions due to confusion  On 3 L of oxygen      Objective   Objective     Vitals:   Temp:  [97.7 °F (36.5 °C)-98.6 °F (37 °C)] 97.7 °F (36.5 °C)  Heart Rate:  [75-93] 93  Resp:  [16-18] 18  BP: (133-177)/(46-94) 145/46  Flow (L/min):  [2-3] 3  Physical Exam   Vital Signs Reviewed   General:  WDWN, NAD.  Chronically ill-appearing male lying in bed  HEENT:  PERRL, EOMI.  OP, nares clear  Chest:  good aeration, clear to auscultation bilaterally, no work of breathing noted   CV: RRR, no MGR, pulses 2+, equal.  Abd:  Soft, NT, ND, + BS, no HSM, obese  EXT:  no clubbing, no cyanosis, no edema  Neuro:  A&Ox1, lethargic and agitated, confused, CN grossly intact, no focal deficits.  Skin: No rashes or lesions noted    Result Review    Result Review:  I have personally reviewed the results from the time of this admission to 2024 08:06 EST and agree with these findings:  [x]  Laboratory  [x]  Microbiology  [x]  Radiology  [x]  EKG/Telemetry   [x]  Cardiology/Vascular   []  Pathology  []  Old records  []  Other:  Most notable findings include:         Lab 24  2349 24  0448 24  0426 24  2236 24  0559 24  1632 24  0600 24  0117 24  0044 24  1707   WBC 10.21 10.00  --   --  10.72  --  12.53* 13.16*  --  14.84*   HEMOGLOBIN 8.6* 7.9*  --   --  7.7* 7.5* 7.1* 8.1*  --  8.3*    HEMATOCRIT 28.6* 26.6*  --   --  25.7* 24.7* 24.1* 26.7*  --  28.1*   PLATELETS 488* 464*  --   --  487*  --  454* 455*  --  491*   SODIUM 137 137  --   --  135*  --  138 139  --  138   SODIUM, ARTERIAL  --   --  137.3 136.2  --   --   --   --    < >  --    POTASSIUM 4.1 4.3  --   --  4.6  --  4.4 4.3  --  5.2   CHLORIDE 101 101  --   --  99  --  102 102  --  102   CO2 25.5 25.9  --   --  26.8  --  27.9 25.3  --  27.8   BUN 23* 25*  --   --  27*  --  30* 31*  --  29*   CREATININE 1.46* 1.56*  --   --  1.73*  --  1.76* 1.74*  --  1.78*   GLUCOSE 100* 130*  --   --  162*  --  130* 268*  --  281*   GLUCOSE, ARTERIAL  --   --  123* 153*  --   --   --   --    < >  --    CALCIUM 8.8 8.7  --   --  8.8  --  8.5* 8.8  --  8.8   PHOSPHORUS 2.6 2.8  --   --  2.8  --  3.0 2.3*  --  2.9   TOTAL PROTEIN 7.8  --   --   --   --   --   --   --   --  8.3   ALBUMIN 2.8*  --   --   --   --   --   --   --   --  2.8*   GLOBULIN 5.0  --   --   --   --   --   --   --   --  5.5    < > = values in this interval not displayed.     ABG on room air 7.44/38/65/25  Chest x-ray overnight with no significant changes    Assessment & Plan   Assessment / Plan     Active Hospital Problems:  Active Hospital Problems    Diagnosis     **Septic joint     Septic arthritis     Skin ulcer of left heel, limited to breakdown of skin     Ulcer of left foot, limited to breakdown of skin     Left foot pain     Type 2 DM with CKD stage 3 and hypertension     Polyneuropathy      Impression:   Acute on chronic hypoxemic and hypercapnic respiratory failure  Acute on chronic COPD exacerbation, FEV1 of 26%  CO2 narcosis respiratory  Acidosis  Elevated BNP, 2590  ERIC on CKD  History of recurrent Pseudomonas infection with MDRO Pseudomonas  History of PE in July 2019  Tobacco abuse in remission  Altered mental status  Toxic encephalopathy     Plan:   -Patient's altered mental status is because he received so many benzodiazepines overnight.  I would probably not do that  again.  ABG with no evidence of acute CO2 retention.  No need to force BiPAP on him at this time with altered mental status.  Just leave him on oxygen and monitor  -On home oxygen of 2 L/min  -Continue home NIPPV nightly with naps on current settings  -Continue Bumex 2 mg IV daily  -Continue to monitor renal panel and electrolytes.   -Continue bronchopulmonary hygiene.  Encourage I-S and flutter  -Continue Brovana, Pulmicort, and DuoNebs  -Encourage mobilization.  Out of bed to chair  -Continue Eliquis for A-fib  -Antibiotics per primary  -Rest of care per primary     DVT prophylaxis:  Medical DVT prophylaxis orders are present.    CODE STATUS:   Code Status (Patient has no pulse and is not breathing): CPR (Attempt to Resuscitate)  Medical Interventions (Patient has pulse or is breathing): Full Support      Labs, imaging, microbiology, notes and medications personally reviewed  Discussed with primary    Electronically signed by Severiano Carolina MD, 02/21/24, 3:34 PM EST.

## 2024-02-21 NOTE — SIGNIFICANT NOTE
Received call from RN earlier in shift that patient was significantly confused, similar to yesterday and on day shift.  Ordered ammonia, which resulted normal.  Patient's confusion worsened later in the shift and responded to RRT with Dr. Thomson and Dr. So.  Patient was found to be rolling around in bed, flailing and not following commands well.  At that time, labs reviewed and new labs ordered.  Also ordered EKG and chest x-ray.  Over the last few shifts, patient has had several workups for his altered mental status without a clear cause.  Initially, we were going to give him 25 mg IV Benadryl, however his daughter was at the bedside and after discussion with a family member, she stated he cannot have  Benadryl.  States it makes him itch worse and causes agitation/altered mental status.  Following review of the chart, he has been taking Atarax 3 times daily scheduled over the last couple of days.  I have put his Atarax on hold to see if this may be contributing to his AMS.  We also gave him 2 doses of 2.5 mg IV Valium to help him tolerate the BiPAP better.    Mikaela Alanis PA-C

## 2024-02-21 NOTE — PROGRESS NOTES
[facility]   Hospitalist Progress Note    Date of admission: 2/16/2024  Patient Name: Preston Wallis  1965  Date: 2/21/2024      Subjective     No chief complaint on file.    Interval Followup:  Patient remains more confused today and thrashing around in bed.  Patient was given some Haldol and Versed overnight by the overnight physician assistant secondary to anxiety and inability to tolerate his BiPAP machine.  He did wear his BiPAP machine for a little bit however this morning he is confused and slightly agitated.    Patient's blood gas that was done this morning appears fine.  Suspect that his confusion and agitation is secondary to medication he was given overnight which includes Haldol and Versed.  This will continue to wear off during the day and hopefully his mental status will improve.    Patient's family also states that he has been scratching his chest so much so that he is causing it to bleed.  Patient's daughter states that he is allergic to Benadryl or that causes him to itch more  Vitals:   Temp:  [97.7 °F (36.5 °C)-98.6 °F (37 °C)] 97.7 °F (36.5 °C)  Heart Rate:  [75-93] 93  Resp:  [16-18] 18  BP: (133-177)/(46-94) 145/46  Flow (L/min):  [2-3] 3    Physical Exam  Chronic ill-appearing morbidly obese in bed. Resting in bed with family at the bedside   HEENT: NC/AT  Resp: decreased and clear   CV: RRRAbdomen soft obese, has some petechiae from where it appears he has been scratching on his chest/abdomen, no obvious superficial skin rash  Left lower extremity wound dressed no discharge or drainage, dry skin on left leg  Neuro: Patient is not currently able to follow commands     Result Review:  Vital signs, labs and recent relevant imaging reviewed.        acetaminophen    albuterol    aluminum-magnesium hydroxide-simethicone    benzonatate    senna-docusate sodium **AND** polyethylene glycol **AND** bisacodyl **AND** bisacodyl    dextrose    dextrose    diazePAM    Diclofenac Sodium     gabapentin    glucagon (human recombinant)    guaiFENesin-dextromethorphan    hydrALAZINE    hydrOXYzine    Lidocaine    melatonin    nicotine    ondansetron    prochlorperazine    sodium chloride    sodium chloride    sodium chloride    apixaban, 5 mg, Oral, Q12H  arformoterol, 15 mcg, Nebulization, BID - RT  budesonide, 0.5 mg, Nebulization, BID - RT  bumetanide, 2 mg, Intravenous, Daily  carvedilol, 25 mg, Oral, Q12H  cetirizine, 10 mg, Oral, Daily  collagenase, 1 Application, Topical, Q24H  diazePAM, , ,   doxycycline, 100 mg, Oral, Q12H  DULoxetine, 30 mg, Oral, Daily  [Held by provider] ferrous sulfate, 325 mg, Oral, Daily With Breakfast  folic acid, 1 mg, Oral, Daily  guaiFENesin, 600 mg, Oral, Q12H  [Held by provider] hydrOXYzine, 25 mg, Oral, TID  insulin detemir, 30 Units, Subcutaneous, BID  insulin lispro, 4-24 Units, Subcutaneous, 4x Daily AC & at Bedtime  ipratropium-albuterol, 3 mL, Nebulization, 4x Daily - RT  [Held by provider] losartan, 25 mg, Oral, Q24H  magnesium oxide, 400 mg, Oral, BID  montelukast, 10 mg, Oral, Nightly  NIFEdipine XL, 30 mg, Oral, QAM  pantoprazole, 40 mg, Oral, Q AM  psyllium, 1 packet, Oral, Daily  saccharomyces boulardii, 250 mg, Oral, BID  senna-docusate sodium, 2 tablet, Oral, BID  sodium chloride, 10 mL, Intravenous, Q12H  Sodium Hypochlorite, , Topical, Q12H        XR Chest 1 View    Result Date: 2/21/2024   No significant interval change allowing for rotation on the current study.       MIAH GREEN MD       Electronically Signed and Approved By: MIAH GREEN MD on 2/21/2024 at 2:07             XR Chest 1 View    Result Date: 2/20/2024   Stable appearance of the chest allowing for difficulties with positioning.       MIAH GREEN MD       Electronically Signed and Approved By: MIAH GREEN MD on 2/20/2024 at 5:55             CT Head Without Contrast    Result Date: 2/17/2024   Motion degraded exam with no definite acute abnormalities.     MIAH GREEN MD        Electronically Signed and Approved By: MIAH GREEN MD on 2/17/2024 at 1:21             XR Chest 1 View    Result Date: 2/17/2024   No significant interval change.       MIAH GREEN MD       Electronically Signed and Approved By: MIAH GREEN MD on 2/17/2024 at 1:01             XR Chest 1 View    Result Date: 2/16/2024    1. There is borderline heart size with pulmonary vascular congestion 2. Chronic bilateral airspace disease       WHIT HARDIN MD       Electronically Signed and Approved By: WHIT HARDIN MD on 2/16/2024 at 16:52             XR chest AP portable    Result Date: 2/16/2024  No significant interval change. Images personally reviewed, interpreted and dictated by SHANT Velasquez.          MRI foot left with and without contrast    Result Date: 2/14/2024  1. Soft tissue ulceration along the plantar/lateral aspect of the forefoot adjacent to the fifth MTP joint with adjacent cellulitis. No abnormal fluid collections to suggest abscess. 2. There is bone marrow edema in the fifth digit proximal phalanx and distal fifth metatarsal and a trace amount of joint effusion. Findings are concerning for septic arthritis with reactive marrow edema. No definite osseous destructive changes/abnormal bone marrow signal changes to suggest osteomyelitis. 3. Mild multijoint degenerative changes of the remaining MTP joints with a trace amount of joint effusion, and without abnormal MRI signal changes or enhancement, likely representing degenerative effusions. Images personally reviewed, interpreted and dictated by SHANT Velasquez.     2/2/24 echo from OSH  Normal LV wall motion with estimated LV ejection fraction 50 to 55%   Mild concentric LVH   Normal valvular structure and function       Assessment / Plan     Summary: 58-year-old male morbidly obese COPD on 2 L baseline, CKD 3/4, MILADY not adherent to BiPAP, chronic nursing home resident who presented to the outside hospital on 2/11 with pneumonia.   Patient was recently admitted to [facility] with tibial plateau fracture.  Transferred here for further evaluation concerned that he may need podiatry intervention for foot infection.  Podiatry evaluated no acute intervention required apart from local wound care and some superficial debridement.  Patient with acute hypoxemic hypercapnic respiratory failure requiring continuous BiPAP initially has issues with nonadherence to this as outpatient.  Pulmonology and podiatry assisting.  Treated with IV antibiotics for foot infection and pneumonia.  Rehab versus home health for discharge.  Hopefully in the next 2 days.    Assessment/Plan (clinically significant if listed here)  Sepsis secondary to Pneumonia, bacterial  Acute hypoxemic hypercapnic respiratory failure  MILADY noncompliant with home bipap  Acute metabolic encephalopathy in setting of above  Diabetic foot Infection/SSTI, unspecified organism   Diastolic CHF with exacerbation 2/2024 from outside hospital EF 50%  CKD3/4 baseline creatinine ~1.4-1.7  Paroxysmal Afib on apixaban  CAD  COPD with exacerbation, baseline 2 L oxygen  Hx of DVT  IDDM2  Iron deficiency anemia   BPH  Hx of neurogenic bladder with chronic lopez  Chronic NH resident  Morbid Obesity  Polypharmacy     PLAN  Patient's white blood cell count is back to normal.  Cefepime has been discontinued as well as vancomycin and patient is currently on doxycycline  Cultures here are unremarkable for specific organism  Continue brov/pulm, scheduled nebs respiratory hygiene  Monitor sedation, continuous pulse ox, continue to encourage BiPAP use.  ABG done this morning and appears fine  Appreciate podiatry assistance, no additional surgical invention need further exam  Pulmonary following  CT of head ordered due to mental status however I feel that it is more likely due to medication he was given for anxiety overnight  Iv bumex  Continue Coreg, nifedipine  Continue to avoid sedating medication  Continue insulin  monitor  Wound care to LE   Off flomax/bph meds as pt with chronic catheter   PT/OT  Check a.m. CBC, BMP, magnesium, phosphorus  Continue hospitalization at current level of care, telemetry,   Dispo: rehab placement       DVT prophylaxis:  Medical DVT prophylaxis orders are present.        Code Status (Patient has no pulse and is not breathing): CPR (Attempt to Resuscitate)  Medical Interventions (Patient has pulse or is breathing): Full Support        CBC          2/18/2024    06:00 2/18/2024    16:32 2/19/2024    05:59 2/20/2024    04:48 2/20/2024    23:49   CBC   WBC 12.53   10.72  10.00  10.21    RBC 2.68   2.90  3.02  3.26    Hemoglobin 7.1  7.5  7.7  7.9  8.6    Hematocrit 24.1  24.7  25.7  26.6  28.6    MCV 89.9   88.6  88.1  87.7    MCH 26.5   26.6  26.2  26.4    MCHC 29.5   30.0  29.7  30.1    RDW 14.7   14.6  14.8  15.0    Platelets 454   487  464  488        CMP          2/18/2024    06:00 2/19/2024    05:59 2/19/2024    22:36 2/20/2024    04:26 2/20/2024    04:48 2/20/2024    23:49   CMP   Glucose 130  162  153  123  130  100    BUN 30  27    25  23    Creatinine 1.76  1.73    1.56  1.46    EGFR 44.3  45.2    51.2  55.4    Sodium 138  135  136.2  137.3  137  137    Potassium 4.4  4.6    4.3  4.1    Chloride 102  99    101  101    Calcium 8.5  8.8    8.7  8.8    Total Protein      7.8    Albumin      2.8    Globulin      5.0    Total Bilirubin      0.2    Alkaline Phosphatase      174    AST (SGOT)      18    ALT (SGPT)      14    Albumin/Globulin Ratio      0.6    BUN/Creatinine Ratio 17.0  15.6    16.0  15.8    Anion Gap 8.1  9.2    10.1  10.5

## 2024-02-21 NOTE — CONSULTS
"RT CM consulted regarding patient having issues tolerating home NIV/Astral.      Mr. Wallis has not been home since breaking his leg in November.  Patient has been in and out of hospital and rehab and has not been using his NIV. Patient is currently confused (not CO2 related) but patient's wife states he was previously adherent with therapy but recently began stating it was \"taking his breath away\".      Patient's Astral is in the room with the patient.  When patient's mental status is back to baseline, DME representative can work with patient to adjust pressures and settings to improve adherence.     Patient noted to have history of bronchiectasis. Chest Vest order was sent in 04/2022; patient reports not receiving the device. I will follow up when patient's mentation is back to baseline.   "

## 2024-02-22 ENCOUNTER — APPOINTMENT (OUTPATIENT)
Dept: CARDIOLOGY | Facility: HOSPITAL | Age: 59
DRG: 871 | End: 2024-02-22
Payer: MEDICAID

## 2024-02-22 ENCOUNTER — APPOINTMENT (OUTPATIENT)
Dept: NEUROLOGY | Facility: HOSPITAL | Age: 59
DRG: 871 | End: 2024-02-22
Payer: MEDICAID

## 2024-02-22 LAB
ANION GAP SERPL CALCULATED.3IONS-SCNC: 14.3 MMOL/L (ref 5–15)
BASE EXCESS BLDA CALC-SCNC: -2.5 MMOL/L (ref -2–2)
BASOPHILS # BLD AUTO: 0.14 10*3/MM3 (ref 0–0.2)
BASOPHILS NFR BLD AUTO: 1.1 % (ref 0–1.5)
BDY SITE: ABNORMAL
BH CV ECHO MEAS - AO ROOT DIAM: 2.9 CM
BH CV ECHO MEAS - EDV(CUBED): 109.2 ML
BH CV ECHO MEAS - EDV(MOD-SP2): 84.7 ML
BH CV ECHO MEAS - EDV(MOD-SP4): 121 ML
BH CV ECHO MEAS - EF(MOD-BP): 61.5 %
BH CV ECHO MEAS - EF(MOD-SP2): 54 %
BH CV ECHO MEAS - EF(MOD-SP4): 67.6 %
BH CV ECHO MEAS - ESV(CUBED): 28.4 ML
BH CV ECHO MEAS - ESV(MOD-SP2): 39 ML
BH CV ECHO MEAS - ESV(MOD-SP4): 39.2 ML
BH CV ECHO MEAS - FS: 36.2 %
BH CV ECHO MEAS - IVS/LVPW: 1.03 CM
BH CV ECHO MEAS - IVSD: 1.26 CM
BH CV ECHO MEAS - LA DIMENSION: 3.2 CM
BH CV ECHO MEAS - LAT PEAK E' VEL: 7.5 CM/SEC
BH CV ECHO MEAS - LV MASS(C)D: 228.1 GRAMS
BH CV ECHO MEAS - LVIDD: 4.8 CM
BH CV ECHO MEAS - LVIDS: 3.1 CM
BH CV ECHO MEAS - LVOT AREA: 3.1 CM2
BH CV ECHO MEAS - LVOT DIAM: 2 CM
BH CV ECHO MEAS - LVPWD: 1.22 CM
BH CV ECHO MEAS - MED PEAK E' VEL: 6.9 CM/SEC
BH CV ECHO MEAS - MV A MAX VEL: 121 CM/SEC
BH CV ECHO MEAS - MV DEC SLOPE: 865 CM/SEC2
BH CV ECHO MEAS - MV DEC TIME: 0.11 SEC
BH CV ECHO MEAS - MV E MAX VEL: 94 CM/SEC
BH CV ECHO MEAS - MV E/A: 0.78
BH CV ECHO MEAS - RVDD: 2.8 CM
BH CV ECHO MEAS - SV(MOD-SP2): 45.7 ML
BH CV ECHO MEAS - SV(MOD-SP4): 81.8 ML
BH CV ECHO MEASUREMENTS AVERAGE E/E' RATIO: 13.06
BUN SERPL-MCNC: 25 MG/DL (ref 6–20)
BUN/CREAT SERPL: 16.2 (ref 7–25)
CALCIUM SPEC-SCNC: 8.9 MG/DL (ref 8.6–10.5)
CHLORIDE SERPL-SCNC: 103 MMOL/L (ref 98–107)
CK MB SERPL-CCNC: 14.07 NG/ML
CK SERPL-CCNC: 206 U/L (ref 20–200)
CO2 SERPL-SCNC: 22.7 MMOL/L (ref 22–29)
COHGB MFR BLD: 0.4 % (ref 0–1.5)
CREAT SERPL-MCNC: 1.54 MG/DL (ref 0.76–1.27)
DEPRECATED RDW RBC AUTO: 49.6 FL (ref 37–54)
EGFRCR SERPLBLD CKD-EPI 2021: 52 ML/MIN/1.73
EOSINOPHIL # BLD AUTO: 0.52 10*3/MM3 (ref 0–0.4)
EOSINOPHIL NFR BLD AUTO: 4.2 % (ref 0.3–6.2)
ERYTHROCYTE [DISTWIDTH] IN BLOOD BY AUTOMATED COUNT: 15.8 % (ref 12.3–15.4)
FHHB: 7.7 % (ref 0–5)
GEN 5 2HR TROPONIN T REFLEX: 138 NG/L
GLUCOSE BLDC GLUCOMTR-MCNC: 125 MG/DL (ref 70–99)
GLUCOSE BLDC GLUCOMTR-MCNC: 131 MG/DL (ref 70–99)
GLUCOSE BLDC GLUCOMTR-MCNC: 151 MG/DL (ref 70–99)
GLUCOSE BLDC GLUCOMTR-MCNC: 201 MG/DL (ref 70–99)
GLUCOSE BLDC GLUCOMTR-MCNC: 94 MG/DL (ref 70–99)
GLUCOSE SERPL-MCNC: 119 MG/DL (ref 65–99)
HCO3 BLDA-SCNC: 21.6 MMOL/L (ref 22–26)
HCT VFR BLD AUTO: 26.9 % (ref 37.5–51)
HGB BLD-MCNC: 8.2 G/DL (ref 13–17.7)
HGB BLDA-MCNC: 12.8 G/DL (ref 13.8–16.4)
IMM GRANULOCYTES # BLD AUTO: 0.07 10*3/MM3 (ref 0–0.05)
IMM GRANULOCYTES NFR BLD AUTO: 0.6 % (ref 0–0.5)
LEFT ATRIUM VOLUME INDEX: 29.8 ML/M2
LYMPHOCYTES # BLD AUTO: 1.79 10*3/MM3 (ref 0.7–3.1)
LYMPHOCYTES NFR BLD AUTO: 14.5 % (ref 19.6–45.3)
MAGNESIUM SERPL-MCNC: 1.8 MG/DL (ref 1.6–2.6)
MCH RBC QN AUTO: 26.9 PG (ref 26.6–33)
MCHC RBC AUTO-ENTMCNC: 30.5 G/DL (ref 31.5–35.7)
MCV RBC AUTO: 88.2 FL (ref 79–97)
METHGB BLD QL: 0.2 % (ref 0–1.5)
MODALITY: ABNORMAL
MONOCYTES # BLD AUTO: 1.27 10*3/MM3 (ref 0.1–0.9)
MONOCYTES NFR BLD AUTO: 10.3 % (ref 5–12)
NEUTROPHILS NFR BLD AUTO: 69.3 % (ref 42.7–76)
NEUTROPHILS NFR BLD AUTO: 8.54 10*3/MM3 (ref 1.7–7)
NRBC BLD AUTO-RTO: 0 /100 WBC (ref 0–0.2)
NT-PROBNP SERPL-MCNC: 947.8 PG/ML (ref 0–900)
OXYHGB MFR BLDV: 91.7 % (ref 94–99)
PCO2 BLDA: 35.5 MM HG (ref 35–45)
PH BLDA: 7.4 PH UNITS (ref 7.35–7.45)
PHOSPHATE SERPL-MCNC: 2.7 MG/DL (ref 2.5–4.5)
PLATELET # BLD AUTO: 463 10*3/MM3 (ref 140–450)
PMV BLD AUTO: 9.1 FL (ref 6–12)
PO2 BLDA: 68.3 MM HG (ref 80–100)
POTASSIUM SERPL-SCNC: 4.4 MMOL/L (ref 3.5–5.2)
RBC # BLD AUTO: 3.05 10*6/MM3 (ref 4.14–5.8)
SAO2 % BLDCOA: 92.3 % (ref 95–99)
SODIUM SERPL-SCNC: 140 MMOL/L (ref 136–145)
T-UPTAKE NFR SERPL: 0.87 TBI (ref 0.8–1.3)
T4 SERPL-MCNC: 6.74 MCG/DL (ref 4.5–11.7)
TROPONIN T DELTA: -10 NG/L
TROPONIN T SERPL HS-MCNC: 148 NG/L
TSH SERPL DL<=0.05 MIU/L-ACNC: 1.79 UIU/ML (ref 0.27–4.2)
URATE SERPL-MCNC: 5 MG/DL (ref 3.4–7)
WBC NRBC COR # BLD AUTO: 12.33 10*3/MM3 (ref 3.4–10.8)

## 2024-02-22 PROCEDURE — 84484 ASSAY OF TROPONIN QUANT: CPT | Performed by: INTERNAL MEDICINE

## 2024-02-22 PROCEDURE — 84550 ASSAY OF BLOOD/URIC ACID: CPT | Performed by: PHYSICIAN ASSISTANT

## 2024-02-22 PROCEDURE — 84443 ASSAY THYROID STIM HORMONE: CPT | Performed by: PHYSICIAN ASSISTANT

## 2024-02-22 PROCEDURE — 82948 REAGENT STRIP/BLOOD GLUCOSE: CPT

## 2024-02-22 PROCEDURE — 82550 ASSAY OF CK (CPK): CPT | Performed by: INTERNAL MEDICINE

## 2024-02-22 PROCEDURE — 82375 ASSAY CARBOXYHB QUANT: CPT | Performed by: INTERNAL MEDICINE

## 2024-02-22 PROCEDURE — 83880 ASSAY OF NATRIURETIC PEPTIDE: CPT | Performed by: INTERNAL MEDICINE

## 2024-02-22 PROCEDURE — 99233 SBSQ HOSP IP/OBS HIGH 50: CPT | Performed by: INTERNAL MEDICINE

## 2024-02-22 PROCEDURE — 93005 ELECTROCARDIOGRAM TRACING: CPT | Performed by: INTERNAL MEDICINE

## 2024-02-22 PROCEDURE — 93306 TTE W/DOPPLER COMPLETE: CPT | Performed by: INTERNAL MEDICINE

## 2024-02-22 PROCEDURE — 80048 BASIC METABOLIC PNL TOTAL CA: CPT | Performed by: INTERNAL MEDICINE

## 2024-02-22 PROCEDURE — 36600 WITHDRAWAL OF ARTERIAL BLOOD: CPT | Performed by: INTERNAL MEDICINE

## 2024-02-22 PROCEDURE — 94799 UNLISTED PULMONARY SVC/PX: CPT

## 2024-02-22 PROCEDURE — 99253 IP/OBS CNSLTJ NEW/EST LOW 45: CPT | Performed by: INTERNAL MEDICINE

## 2024-02-22 PROCEDURE — 82805 BLOOD GASES W/O2 SATURATION: CPT | Performed by: INTERNAL MEDICINE

## 2024-02-22 PROCEDURE — 84100 ASSAY OF PHOSPHORUS: CPT | Performed by: INTERNAL MEDICINE

## 2024-02-22 PROCEDURE — 83735 ASSAY OF MAGNESIUM: CPT | Performed by: INTERNAL MEDICINE

## 2024-02-22 PROCEDURE — 93010 ELECTROCARDIOGRAM REPORT: CPT | Performed by: INTERNAL MEDICINE

## 2024-02-22 PROCEDURE — 94761 N-INVAS EAR/PLS OXIMETRY MLT: CPT

## 2024-02-22 PROCEDURE — 85025 COMPLETE CBC W/AUTO DIFF WBC: CPT | Performed by: INTERNAL MEDICINE

## 2024-02-22 PROCEDURE — 25010000002 BUMETANIDE PER 0.5 MG: Performed by: INTERNAL MEDICINE

## 2024-02-22 PROCEDURE — 63710000001 INSULIN LISPRO (HUMAN) PER 5 UNITS: Performed by: INTERNAL MEDICINE

## 2024-02-22 PROCEDURE — 94664 DEMO&/EVAL PT USE INHALER: CPT

## 2024-02-22 PROCEDURE — 84436 ASSAY OF TOTAL THYROXINE: CPT | Performed by: PHYSICIAN ASSISTANT

## 2024-02-22 PROCEDURE — 83050 HGB METHEMOGLOBIN QUAN: CPT | Performed by: INTERNAL MEDICINE

## 2024-02-22 PROCEDURE — 84479 ASSAY OF THYROID (T3 OR T4): CPT | Performed by: PHYSICIAN ASSISTANT

## 2024-02-22 PROCEDURE — 93306 TTE W/DOPPLER COMPLETE: CPT

## 2024-02-22 PROCEDURE — 82553 CREATINE MB FRACTION: CPT | Performed by: INTERNAL MEDICINE

## 2024-02-22 PROCEDURE — 82948 REAGENT STRIP/BLOOD GLUCOSE: CPT | Performed by: INTERNAL MEDICINE

## 2024-02-22 PROCEDURE — 95816 EEG AWAKE AND DROWSY: CPT

## 2024-02-22 RX ORDER — IPRATROPIUM BROMIDE AND ALBUTEROL SULFATE 2.5; .5 MG/3ML; MG/3ML
3 SOLUTION RESPIRATORY (INHALATION) EVERY 6 HOURS PRN
Status: DISCONTINUED | OUTPATIENT
Start: 2024-02-22 | End: 2024-02-25 | Stop reason: HOSPADM

## 2024-02-22 RX ADMIN — Medication 10 ML: at 20:37

## 2024-02-22 RX ADMIN — DOXYCYCLINE 100 MG: 100 CAPSULE ORAL at 10:20

## 2024-02-22 RX ADMIN — IPRATROPIUM BROMIDE AND ALBUTEROL SULFATE 3 ML: .5; 3 SOLUTION RESPIRATORY (INHALATION) at 16:03

## 2024-02-22 RX ADMIN — BISACODYL 5 MG: 5 TABLET, COATED ORAL at 10:16

## 2024-02-22 RX ADMIN — APIXABAN 5 MG: 5 TABLET, FILM COATED ORAL at 10:20

## 2024-02-22 RX ADMIN — GUAIFENESIN 600 MG: 600 TABLET ORAL at 10:26

## 2024-02-22 RX ADMIN — IPRATROPIUM BROMIDE AND ALBUTEROL SULFATE 3 ML: .5; 3 SOLUTION RESPIRATORY (INHALATION) at 07:04

## 2024-02-22 RX ADMIN — BUMETANIDE 2 MG: 0.25 INJECTION INTRAMUSCULAR; INTRAVENOUS at 12:50

## 2024-02-22 RX ADMIN — ARFORMOTEROL TARTRATE 15 MCG: 15 SOLUTION RESPIRATORY (INHALATION) at 19:24

## 2024-02-22 RX ADMIN — CARVEDILOL 25 MG: 25 TABLET, FILM COATED ORAL at 20:39

## 2024-02-22 RX ADMIN — INSULIN LISPRO 4 UNITS: 100 INJECTION, SOLUTION INTRAVENOUS; SUBCUTANEOUS at 18:34

## 2024-02-22 RX ADMIN — MONTELUKAST 10 MG: 10 TABLET, FILM COATED ORAL at 20:41

## 2024-02-22 RX ADMIN — CARVEDILOL 25 MG: 25 TABLET, FILM COATED ORAL at 10:20

## 2024-02-22 RX ADMIN — BUDESONIDE 0.5 MG: 0.5 INHALANT ORAL at 19:24

## 2024-02-22 RX ADMIN — DULOXETINE HYDROCHLORIDE 30 MG: 30 CAPSULE, DELAYED RELEASE ORAL at 10:26

## 2024-02-22 RX ADMIN — ARFORMOTEROL TARTRATE 15 MCG: 15 SOLUTION RESPIRATORY (INHALATION) at 07:04

## 2024-02-22 RX ADMIN — FOLIC ACID 1 MG: 1 TABLET ORAL at 10:26

## 2024-02-22 RX ADMIN — Medication 10 ML: at 12:50

## 2024-02-22 RX ADMIN — BUDESONIDE 0.5 MG: 0.5 INHALANT ORAL at 07:04

## 2024-02-22 RX ADMIN — DOCUSATE SODIUM 50MG AND SENNOSIDES 8.6MG 2 TABLET: 8.6; 5 TABLET, FILM COATED ORAL at 10:24

## 2024-02-22 RX ADMIN — NIFEDIPINE 30 MG: 30 TABLET, FILM COATED, EXTENDED RELEASE ORAL at 10:19

## 2024-02-22 RX ADMIN — Medication 250 MG: at 10:26

## 2024-02-22 RX ADMIN — PSYLLIUM HUSK 1 PACKET: 3.4 POWDER ORAL at 10:23

## 2024-02-22 RX ADMIN — INSULIN LISPRO 8 UNITS: 100 INJECTION, SOLUTION INTRAVENOUS; SUBCUTANEOUS at 20:37

## 2024-02-22 RX ADMIN — APIXABAN 5 MG: 5 TABLET, FILM COATED ORAL at 20:41

## 2024-02-22 RX ADMIN — IPRATROPIUM BROMIDE AND ALBUTEROL SULFATE 3 ML: .5; 3 SOLUTION RESPIRATORY (INHALATION) at 00:37

## 2024-02-22 RX ADMIN — CETIRIZINE HYDROCHLORIDE 10 MG: 10 TABLET, FILM COATED ORAL at 10:26

## 2024-02-22 RX ADMIN — ACETAMINOPHEN 650 MG: 325 TABLET ORAL at 11:46

## 2024-02-22 RX ADMIN — Medication 400 MG: at 10:26

## 2024-02-22 NOTE — SIGNIFICANT NOTE
Due to patient's tossing/turning, Unable to safely maintain port access at this time.  PIV placed for access.

## 2024-02-22 NOTE — PLAN OF CARE
Goal Outcome Evaluation:      NSR on monitor. VSS. PT on room air overnight with continous pulse ox in place. Family at bedside during shift change and updated. PT not responding to questions or making appropriate eye contact. Pt unable to follow commands. Pt unable to take most medications. Port flushed with difficulty, no blood return noted. Pt itching and causing wounds and bleeding. Approx 0230 RN found patient covered in blood in the bed. Hospitalist on floor and asked to come to bedside. It was found patient had created a wound on the buttocks and was bleeding from it. Wound was dressed, pt continued to pull dressing of through the night. Labs added. Doyle catheter in place, pt continues to pull and dislodge stat lock. Pt responding with one word minimally. Bed changed multiple times and pt repositioned by staff for safety. Pt did unlock his own bedrails at one point. Bed alarm active throughout shift.     Domi Carlos RN                                           Negative

## 2024-02-22 NOTE — CONSULTS
[facility]   Cardiology Consult Note    Patient Name: Preston Wallis  : 1965  MRN: [id number]  Primary Care Physician:  Kimmy Riley MD  Referring Physician: Nguyễn Farnsworth DO     Date of admission: 2024    Subjective   Subjective     Reason for Consultation : Chest pain    Chief Complaint : Shortness of breath, fever, confusion, foot pain    HPI:  Preston Wallis is a 58 y.o. male with chronic diastolic heart failure, paroxysmal atrial fibrillation on chronic kidney disease, COPD, obstructive sleep apnea, hypertension, diabetes mellitus, neurogenic bladder, on intermittent self-catheterization.  He is currently a nursing home resident.  He was admitted to the hospital on 2024 as a transfer from [facility] emergency room because of altered mental status, hypercapnic respiratory failure and septic arthritis.  He is on broad-spectrum antibiotics, along with local wound care and superficial debridement of the wound.  He is on oral diuretics at home.  Currently getting IV diuretics.  Home losartan is on hold due to worsening renal functions.  Overall, patient's clinical status since admission improved.  Blood cultures have been negative.    This morning, he reported an episode of chest pain.  Subsequently, he had an EKG which was normal.  High sensitive troponin was mildly elevated but subsequently trended down.  Cardiology was consulted to assist with further care.  During my interview, patient denied any active chest pain.  He does not remember having chest pain in the morning.  He denies shortness of breath or palpitations.  His mental status significantly improved over the past 24 hours.    Review of Systems   All systems were reviewed and negative except for: Shortness of breath, foot pain, fatigue and single episode of chest pain.    Personal History      Medical History   Chronic diastolic heart failure  Paroxysmal atrial fibrillation  Chronic kidney  disease  COPD  Hypertension  Sleep apnea  Diabetes mellitus  Neurogenic bladder, currently doing intermittent self catheterizations    Family History: family history includes Asthma in his father; Cancer in his sister; Coronary artery disease in his mother; Diabetes type II in his mother and sister; Hypertension in his mother. Otherwise pertinent FHx was reviewed and not pertinent to current issue.    Social History:  reports that he quit smoking about 31 years ago. His smoking use included cigarettes. He started smoking about 43 years ago. He has a 12.00 pack-year smoking history. He has never used smokeless tobacco. He reports that he does not currently use alcohol. He reports that he does not use drugs.    Home Medications:  Budeson-Glycopyrrol-Formoterol, DULoxetine, Fluticasone-Salmeterol, Insulin Glargine, Insulin Lispro (1 Unit Dial), NIFEdipine XL, O2, Vitamin D3, Zinc, albuterol sulfate HFA, apixaban, atorvastatin, bumetanide, calcium citrate, carvedilol, dapagliflozin, docusate sodium, exenatide er, famotidine, ferrous gluconate, finasteride, folic acid, furosemide, gabapentin, losartan, magnesium oxide, metoprolol tartrate, montelukast, multivitamin with iron, ondansetron, silver sulfadiazine, traZODone, and vitamin C    Allergies:  Allergies   Allergen Reactions    Benadryl [Diphenhydramine] Itching    Proventil [Albuterol] Other (See Comments)     Mouth sores         Objective    Objective     Vitals:   Temp:  [97.3 °F (36.3 °C)-98.1 °F (36.7 °C)] 98.1 °F (36.7 °C)  Heart Rate:  [85-96] 85  Resp:  [18-20] 18  BP: ()/(56-91) 159/75      Physical Exam:   Constitutional: Awake, alert, No acute distress, nasal cannula on   Eyes: PERRLA, sclerae anicteric, no conjunctival injection   HENT: NCAT, mucous membranes moist   Neck: Supple, no thyromegaly, no lymphadenopathy, trachea midline   Respiratory: Clear to auscultation bilaterally, nonlabored respirations    Cardiovascular: RRR, no murmurs, rubs, or  gallops, palpable pedal pulses bilaterally   Gastrointestinal: Positive bowel sounds, soft, nontender, nondistended   Musculoskeletal: Left foot in dressing, no edema right ankle no clubbing or cyanosis to extremities   Psychiatric: Appropriate affect, cooperative   Neurologic: Oriented x 2, , speech clear   Skin: No rashes     Result Review    Result Review:  I have personally reviewed the results from the time of this admission to 2/22/2024 16:02 EST and agree with these findings:  [x]  Laboratory  []  Microbiology  [x]  Radiology  [x]  EKG/Telemetry   [x]  Cardiology/Vascular   []  Pathology  [x]  Old records  []  Other:  Most notable findings include:           Latest Reference Range & Units 02/16/24 17:07 02/22/24 04:32 02/22/24 09:05   Creatine Kinase 20 - 200 U/L   206 (H)   CKMB <=10.40 ng/mL   14.07 (H)   HS Troponin T <22 ng/L  148 (C) 138 (C)   Troponin T Delta >=-4 - <+4 ng/L   -10 (L)   proBNP 0.0 - 900.0 pg/mL 2,580.0 (H)  947.8 (H)       EKG done today showed sinus rhythm, normal axis, no significant ischemic changes      Assessment & Plan   Assessment / Plan     Brief Patient Summary:  Preston Wallis is a 58 y.o. male with chronic diastolic heart failure, paroxysmal atrial fibrillation on chronic kidney disease, COPD, obstructive sleep apnea, hypertension, diabetes mellitus, neurogenic bladder, on intermittent self-catheterization.  Currently admitted with septic arthritis,  Pneumonia with sepsis and hypoxic hypercarbic respiratory failure.    Active Hospital Problems:  Active Hospital Problems    Diagnosis     **Septic joint     Sepsis secondary to pneumonia, bacterial     Hypoxic hypercarbic respiratory failure, improved     Altered mental status     Neurogenic bladder, chronic Doyle/self-catheterization     Type 2 DM with CKD stage 3 and hypertension     Polyneuropathy      Chest pain : Symptoms are somewhat atypical, currently pain-free.  EKG is normal with no ischemic changes.  High sensitive  troponin is elevated and flat.  Differential diagnosis include type II MI secondary to acute systemic illness, respiratory failure Vs type I MI (less likely)    Paroxysmal atrial fibrillation : On anticoagulation at home, currently in normal sinus rhythm  Chronic diastolic heart failure : Currently on IV diuretics, proBNP near normal today, patient is near euvolemic on physical examination    Plan:     Will proceed with an echocardiogram to reevaluate LV function and rule out any regional wall motion abnormalities  Continue IV diuretics, repeat labs a.m.  Continue home antihypertensive medications including beta-blockers    Antibiotics per primary team and pulmonology  We will follow    Electronically signed by Lino Ware MD, 02/22/24, 4:02 PM EST.

## 2024-02-22 NOTE — PROGRESS NOTES
Pulmonary / Critical Care Progress Note      Patient Name: Preston Wallis  : 1965  MRN: [id number]  Primary Care Physician:  Kimmy Riley MD  Date of admission: 2024    Subjective   Subjective   Follow-up for hypercapnic and hypoxemic respiratory failure requiring NIPPV    No acute events overnight.    This morning,  Lying in bed  Remains on room air  Family at bedside  Mentation improved  Overall feeling better  Denies dyspnea  No fever or chills  No chest pain or hemoptysis      Objective   Objective     Vitals:   Temp:  [97.7 °F (36.5 °C)] 97.7 °F (36.5 °C)  Heart Rate:  [88-96] 93  Resp:  [18-20] 18  BP: (145-167)/(46-91) 158/68  Flow (L/min):  [2] 2    Physical Exam   Vital Signs Reviewed   General:  WDWN, NAD.  Chronically ill-appearing male, lying in bed  Chest:  good aeration, clear to auscultation bilaterally, no work of breathing noted   CV: RRR, no MGR, pulses 2+, equal.  Abd:  Soft, NT, ND, + BS, no HSM, obese  EXT:  no clubbing, no cyanosis, no edema  Neuro:  A&Ox2-3, CN grossly intact, no focal deficits.  Skin: No rashes or lesions noted    Result Review    Result Review:  I have personally reviewed the results from the time of this admission to 2024 07:56 EST and agree with these findings:  [x]  Laboratory  [x]  Microbiology  [x]  Radiology  [x]  EKG/Telemetry   [x]  Cardiology/Vascular   []  Pathology  []  Old records  []  Other:  Most notable findings include:    ABG on room air 7.40/35/68      Lab 24  0432 24  1659 24  2349 24  0448 24  0426 24  2236 24  0559 24  1632 24  0600 24  0117 24  0044 24  1707   WBC 12.33* 11.95* 10.21 10.00  --   --  10.72  --  12.53* 13.16*  --  14.84*   HEMOGLOBIN 8.2* 8.3* 8.6* 7.9*  --   --  7.7* 7.5* 7.1* 8.1*  --  8.3*   HEMATOCRIT 26.9* 27.1* 28.6* 26.6*  --   --  25.7* 24.7* 24.1* 26.7*  --  28.1*   PLATELETS 463* 469* 488* 464*  --   --  487*  --  454* 455*  --   491*   SODIUM 140 140 137 137  --   --  135*  --  138 139  --  138   SODIUM, ARTERIAL  --   --   --   --  137.3 136.2  --   --   --   --    < >  --    POTASSIUM 4.4 4.4 4.1 4.3  --   --  4.6  --  4.4 4.3  --  5.2   CHLORIDE 103 105 101 101  --   --  99  --  102 102  --  102   CO2 22.7 23.7 25.5 25.9  --   --  26.8  --  27.9 25.3  --  27.8   BUN 25* 25* 23* 25*  --   --  27*  --  30* 31*  --  29*   CREATININE 1.54* 1.54* 1.46* 1.56*  --   --  1.73*  --  1.76* 1.74*  --  1.78*   GLUCOSE 119* 101* 100* 130*  --   --  162*  --  130* 268*  --  281*   GLUCOSE, ARTERIAL  --   --   --   --  123* 153*  --   --   --   --    < >  --    CALCIUM 8.9 8.7 8.8 8.7  --   --  8.8  --  8.5* 8.8  --  8.8   PHOSPHORUS 2.7  --  2.6 2.8  --   --  2.8  --  3.0 2.3*  --  2.9   URIC ACID 5.0  --   --   --   --   --   --   --   --   --   --   --    TOTAL PROTEIN  --   --  7.8  --   --   --   --   --   --   --   --  8.3   ALBUMIN  --   --  2.8*  --   --   --   --   --   --   --   --  2.8*   GLOBULIN  --   --  5.0  --   --   --   --   --   --   --   --  5.5    < > = values in this interval not displayed.     Assessment & Plan   Assessment / Plan     Active Hospital Problems:  Active Hospital Problems    Diagnosis     **Septic joint     Septic arthritis     Skin ulcer of left heel, limited to breakdown of skin     Ulcer of left foot, limited to breakdown of skin     Left foot pain     Type 2 DM with CKD stage 3 and hypertension     Polyneuropathy      Impression:   Acute on chronic hypoxemic and hypercapnic respiratory failure  Acute on chronic COPD exacerbation, FEV1 of 26%  CO2 narcosis respiratory  Acidosis  Elevated BNP, 2590  ERIC on CKD  History of recurrent Pseudomonas infection with MDRO Pseudomonas  History of PE in July 2019  Tobacco abuse in remission  Altered mental status  Toxic encephalopathy     Plan:   -Patient's mentation has much improved.  -CT of head without acute intracranial process noted.  -Continue to maintain SpO2 greater  than 90%  -Continue home NIPPV nightly with naps on current settings  -RT  on board.  Appreciate assistance.  -Completed course of antibiotics.  -Continue Bumex 2 mg IV daily  -Continue to monitor renal panel and electrolytes.  Replace electrolytes as needed.  -Continue bronchopulmonary hygiene.  Encourage I-S and flutter  -Continue Brovana, Pulmicort, and DuoNebs  -Encourage mobilization.  Out of bed to chair  -Continue Eliquis for A-fib  -Rest of care per primary    DVT prophylaxis:  Medical DVT prophylaxis orders are present.    CODE STATUS:   Code Status (Patient has no pulse and is not breathing): CPR (Attempt to Resuscitate)  Medical Interventions (Patient has pulse or is breathing): Full Support      Labs, imaging, microbiology, notes and medications personally reviewed  Discussed with primary    I, Dr. Severiano Carolina, have spent more than 50% of the total time managing the patient in this encounter today.  This included personally reviewing all pertinent labs, imaging, microbiology and documentation. Also discussing the case with the patient and any available family, the admitting physician and any available ancillary staff.    Electronically signed by CNO Camilo, 02/22/24, 2:36 PM EST.  Electronically signed by Severiano Carolina MD, 02/22/24, 2:45 PM EST.

## 2024-02-22 NOTE — CASE MANAGEMENT/SOCIAL WORK
Patient was on EEG when I went to visit.  [facility] RT will come later today to make Astral adjustments.

## 2024-02-22 NOTE — NURSING NOTE
"RN attempted to give patient night time medications. Pt only responded once when RN stated patient name and he said  \"what\". Pt might be oriented to self at times. RN gave pt ice chips and pt was able to take small bites at times. RN attempted to give medications one at a time with ice chips. Pt was able to take eliquis with ice chips. Rn then attempted to give another medication and pt spit it back out. Rn then gave patient a bite of pudding and pt did well. RN attempted to give medication with pudding one at a time and pt spit the medicine out. Only medication pt received on night time medication pass was eliquis.     Domi Carlos RN    "

## 2024-02-22 NOTE — PLAN OF CARE
Goal Outcome Evaluation:      At end of shift, patient is a&o x3, did not know the year. Ate half of dinner. Had 2 BM this shift. Turning in bed on own. Wound care completed per order. Decreased restlessness. Chest port deaccessed, IV access. No concerns at this time.  Adeola Haines RN

## 2024-02-22 NOTE — PROGRESS NOTES
[facility]   Hospitalist Progress Note    Date of admission: 2/16/2024  Patient Name: Preston Wallis  1965  Date: 2/22/2024      Subjective     No chief complaint on file.    Interval Followup:  Patient's confusion is much better today.  He is not back to his baseline but he is much more oriented able to follow commands.  Patient's sedating medications have been held.  His blood gas this morning appears fine.  Patient still is not able to wear his BiPAP due to anxiety.  Patient has also not been having as much itching.    This morning patient did complain of chest pain.  Patient's troponin was slightly elevated and his EKG was fine.  Patient currently is denying chest pain.  Patient's family would like neurology evaluation as to his mental status also.  They would also like an MRI of his brain.        Vitals:   Temp:  [97.3 °F (36.3 °C)-97.7 °F (36.5 °C)] 97.3 °F (36.3 °C)  Heart Rate:  [88-96] 95  Resp:  [18-20] 18  BP: ()/(56-91) 85/56  Flow (L/min):  [2] 2    Physical Exam  Chronic ill-appearing morbidly obese in bed. Resting in bed with family at the bedside   HEENT: NC/AT  Resp: decreased and clear   CV: RRRAbdomen soft obese, has some petechiae from where it appears he has been scratching on his chest/abdomen, no obvious superficial skin rash  Left lower extremity wound dressed no discharge or drainage, dry skin on left leg  Neuro: Patient is alert to person.  He is able to follow commands.  No focal deficits noted    Result Review:  Vital signs, labs and recent relevant imaging reviewed.        acetaminophen    albuterol    aluminum-magnesium hydroxide-simethicone    benzonatate    senna-docusate sodium **AND** polyethylene glycol **AND** bisacodyl **AND** bisacodyl    dextrose    dextrose    Diclofenac Sodium    glucagon (human recombinant)    guaiFENesin-dextromethorphan    hydrALAZINE    Lidocaine    melatonin    nicotine    ondansetron    prochlorperazine    sodium chloride    sodium  chloride    sodium chloride    apixaban, 5 mg, Oral, Q12H  arformoterol, 15 mcg, Nebulization, BID - RT  budesonide, 0.5 mg, Nebulization, BID - RT  bumetanide, 2 mg, Intravenous, Daily  carvedilol, 25 mg, Oral, Q12H  cetirizine, 10 mg, Oral, Daily  collagenase, 1 Application, Topical, Q24H  doxycycline, 100 mg, Oral, Q12H  DULoxetine, 30 mg, Oral, Daily  [Held by provider] ferrous sulfate, 325 mg, Oral, Daily With Breakfast  folic acid, 1 mg, Oral, Daily  guaiFENesin, 600 mg, Oral, Q12H  insulin detemir, 30 Units, Subcutaneous, BID  insulin lispro, 4-24 Units, Subcutaneous, 4x Daily AC & at Bedtime  ipratropium-albuterol, 3 mL, Nebulization, 4x Daily - RT  [Held by provider] losartan, 25 mg, Oral, Q24H  magnesium oxide, 400 mg, Oral, BID  montelukast, 10 mg, Oral, Nightly  NIFEdipine XL, 30 mg, Oral, QAM  pantoprazole, 40 mg, Oral, Q AM  psyllium, 1 packet, Oral, Daily  saccharomyces boulardii, 250 mg, Oral, BID  senna-docusate sodium, 2 tablet, Oral, BID  sodium chloride, 10 mL, Intravenous, Q12H  Sodium Hypochlorite, , Topical, Q12H        CT Head Without Contrast    Result Date: 2/21/2024   No acute intracranial process identified.     LU RIVERA MD       Electronically Signed and Approved By: LU RIVERA MD on 2/21/2024 at 16:25             XR Chest 1 View    Result Date: 2/21/2024   No significant interval change allowing for rotation on the current study.       MIAH GREEN MD       Electronically Signed and Approved By: MIAH GREEN MD on 2/21/2024 at 2:07             XR Chest 1 View    Result Date: 2/20/2024   Stable appearance of the chest allowing for difficulties with positioning.       MIAH GREEN MD       Electronically Signed and Approved By: MIAH GREEN MD on 2/20/2024 at 5:55             CT Head Without Contrast    Result Date: 2/17/2024   Motion degraded exam with no definite acute abnormalities.     MIAH GREEN MD       Electronically Signed and Approved By: MIAH GREEN  MD on 2/17/2024 at 1:21             XR Chest 1 View    Result Date: 2/17/2024   No significant interval change.       MIAH GREEN MD       Electronically Signed and Approved By: MIAH GREEN MD on 2/17/2024 at 1:01             XR Chest 1 View    Result Date: 2/16/2024    1. There is borderline heart size with pulmonary vascular congestion 2. Chronic bilateral airspace disease       WHIT HARDIN MD       Electronically Signed and Approved By: WHIT HARDIN MD on 2/16/2024 at 16:52             XR chest AP portable    Result Date: 2/16/2024  No significant interval change. Images personally reviewed, interpreted and dictated by SHANT Velasquez.          MRI foot left with and without contrast    Result Date: 2/14/2024  1. Soft tissue ulceration along the plantar/lateral aspect of the forefoot adjacent to the fifth MTP joint with adjacent cellulitis. No abnormal fluid collections to suggest abscess. 2. There is bone marrow edema in the fifth digit proximal phalanx and distal fifth metatarsal and a trace amount of joint effusion. Findings are concerning for septic arthritis with reactive marrow edema. No definite osseous destructive changes/abnormal bone marrow signal changes to suggest osteomyelitis. 3. Mild multijoint degenerative changes of the remaining MTP joints with a trace amount of joint effusion, and without abnormal MRI signal changes or enhancement, likely representing degenerative effusions. Images personally reviewed, interpreted and dictated by SHANT Velasquez.     2/2/24 echo from OSH  Normal LV wall motion with estimated LV ejection fraction 50 to 55%   Mild concentric LVH   Normal valvular structure and function       Assessment / Plan     Summary: 58-year-old male morbidly obese COPD on 2 L baseline, CKD 3/4, MILADY not adherent to BiPAP, chronic nursing home resident who presented to the outside hospital on 2/11 with pneumonia.  Patient was recently admitted to [place] with tibial  plateau fracture.  Transferred here for further evaluation concerned that he may need podiatry intervention for foot infection.  Podiatry evaluated no acute intervention required apart from local wound care and some superficial debridement.  Patient with acute hypoxemic hypercapnic respiratory failure requiring continuous BiPAP initially has issues with nonadherence to this as outpatient.  Pulmonology and podiatry assisting.  Treated with IV antibiotics for foot infection and pneumonia.  Rehab versus home health for discharge.  Hopefully in the next 2 days.    Assessment/Plan (clinically significant if listed here)  Sepsis secondary to Pneumonia, bacterial  Acute hypoxemic hypercapnic respiratory failure  MILADY noncompliant with home bipap  Acute metabolic encephalopathy in setting of above  Diabetic foot Infection/SSTI, unspecified organism   Diastolic CHF with exacerbation 2/2024 from outside hospital EF 50%  CKD3/4 baseline creatinine ~1.4-1.7  Paroxysmal Afib on apixaban  CAD  Elevated troponin  COPD with exacerbation, baseline 2 L oxygen  Hx of DVT  IDDM2  Iron deficiency anemia   BPH  Hx of neurogenic bladder with chronic lopez  Chronic NH resident  Morbid Obesity  Polypharmacy     PLAN  Patient's white blood cell count is back to normal.  Cefepime has been discontinued as well as vancomycin and patient is currently on doxycycline  Cultures here are unremarkable for specific organism  Continue brov/pulm, scheduled nebs respiratory hygiene  Monitor sedation, continuous pulse ox, continue to encourage BiPAP use.  ABG done this morning and appears fine  Appreciate podiatry assistance, no additional surgical invention need further exam  Pulmonary following  CT of head was fine.  Given patient's continued confusion however it is improving.  Patient's family requesting neurology evaluation as well as MRI of the brain which I have ordered.  Will also get an EEG.  For patient's chest pain will obtain echocardiogram and  cardiology consult  Iv bumex  Continue Coreg, nifedipine  Continue insulin monitor  Wound care to LE   Off flomax/bph meds as pt with chronic catheter   PT/OT  Check a.m. CBC, BMP, magnesium, phosphorus  Continue hospitalization at current level of care, telemetry,   Dispo: rehab placement       DVT prophylaxis:  Medical DVT prophylaxis orders are present.        Code Status (Patient has no pulse and is not breathing): CPR (Attempt to Resuscitate)  Medical Interventions (Patient has pulse or is breathing): Full Support        CBC          2/20/2024    04:48 2/20/2024    23:49 2/21/2024    16:59 2/22/2024    04:32   CBC   WBC 10.00  10.21  11.95  12.33    RBC 3.02  3.26  3.08  3.05    Hemoglobin 7.9  8.6  8.3  8.2    Hematocrit 26.6  28.6  27.1  26.9    MCV 88.1  87.7  88.0  88.2    MCH 26.2  26.4  26.9  26.9    MCHC 29.7  30.1  30.6  30.5    RDW 14.8  15.0  15.9  15.8    Platelets 464  488  469  463        CMP          2/20/2024    04:26 2/20/2024    04:48 2/20/2024    23:49 2/21/2024    16:59 2/22/2024    04:32   CMP   Glucose 123  130  100  101  119    BUN  25  23  25  25    Creatinine  1.56  1.46  1.54  1.54    EGFR  51.2  55.4  52.0  52.0    Sodium 137.3  137  137  140  140    Potassium  4.3  4.1  4.4  4.4    Chloride  101  101  105  103    Calcium  8.7  8.8  8.7  8.9    Total Protein   7.8      Albumin   2.8      Globulin   5.0      Total Bilirubin   0.2      Alkaline Phosphatase   174      AST (SGOT)   18      ALT (SGPT)   14      Albumin/Globulin Ratio   0.6      BUN/Creatinine Ratio  16.0  15.8  16.2  16.2    Anion Gap  10.1  10.5  11.3  14.3

## 2024-02-23 ENCOUNTER — APPOINTMENT (OUTPATIENT)
Dept: MRI IMAGING | Facility: HOSPITAL | Age: 59
DRG: 871 | End: 2024-02-23
Payer: MEDICAID

## 2024-02-23 LAB
ANION GAP SERPL CALCULATED.3IONS-SCNC: 12.5 MMOL/L (ref 5–15)
BASOPHILS # BLD AUTO: 0.1 10*3/MM3 (ref 0–0.2)
BASOPHILS NFR BLD AUTO: 1.1 % (ref 0–1.5)
BUN SERPL-MCNC: 24 MG/DL (ref 6–20)
BUN/CREAT SERPL: 15.1 (ref 7–25)
CALCIUM SPEC-SCNC: 8.4 MG/DL (ref 8.6–10.5)
CHLORIDE SERPL-SCNC: 95 MMOL/L (ref 98–107)
CK SERPL-CCNC: 108 U/L (ref 20–200)
CO2 SERPL-SCNC: 22.5 MMOL/L (ref 22–29)
CREAT SERPL-MCNC: 1.59 MG/DL (ref 0.76–1.27)
DEPRECATED RDW RBC AUTO: 52.2 FL (ref 37–54)
EGFRCR SERPLBLD CKD-EPI 2021: 50 ML/MIN/1.73
EOSINOPHIL # BLD AUTO: 0.57 10*3/MM3 (ref 0–0.4)
EOSINOPHIL NFR BLD AUTO: 6.5 % (ref 0.3–6.2)
ERYTHROCYTE [DISTWIDTH] IN BLOOD BY AUTOMATED COUNT: 16.2 % (ref 12.3–15.4)
GLUCOSE BLDC GLUCOMTR-MCNC: 114 MG/DL (ref 70–99)
GLUCOSE BLDC GLUCOMTR-MCNC: 139 MG/DL (ref 70–99)
GLUCOSE BLDC GLUCOMTR-MCNC: 195 MG/DL (ref 70–99)
GLUCOSE BLDC GLUCOMTR-MCNC: 209 MG/DL (ref 70–99)
GLUCOSE SERPL-MCNC: 99 MG/DL (ref 65–99)
HCT VFR BLD AUTO: 26 % (ref 37.5–51)
HGB BLD-MCNC: 7.8 G/DL (ref 13–17.7)
IMM GRANULOCYTES # BLD AUTO: 0.05 10*3/MM3 (ref 0–0.05)
IMM GRANULOCYTES NFR BLD AUTO: 0.6 % (ref 0–0.5)
LYMPHOCYTES # BLD AUTO: 2.27 10*3/MM3 (ref 0.7–3.1)
LYMPHOCYTES NFR BLD AUTO: 25.9 % (ref 19.6–45.3)
MAGNESIUM SERPL-MCNC: 1.7 MG/DL (ref 1.6–2.6)
MCH RBC QN AUTO: 26.9 PG (ref 26.6–33)
MCHC RBC AUTO-ENTMCNC: 30 G/DL (ref 31.5–35.7)
MCV RBC AUTO: 89.7 FL (ref 79–97)
MONOCYTES # BLD AUTO: 0.91 10*3/MM3 (ref 0.1–0.9)
MONOCYTES NFR BLD AUTO: 10.4 % (ref 5–12)
NEUTROPHILS NFR BLD AUTO: 4.85 10*3/MM3 (ref 1.7–7)
NEUTROPHILS NFR BLD AUTO: 55.5 % (ref 42.7–76)
NRBC BLD AUTO-RTO: 0 /100 WBC (ref 0–0.2)
PHOSPHATE SERPL-MCNC: 2.9 MG/DL (ref 2.5–4.5)
PLATELET # BLD AUTO: 339 10*3/MM3 (ref 140–450)
PMV BLD AUTO: 10.6 FL (ref 6–12)
POTASSIUM SERPL-SCNC: 3.8 MMOL/L (ref 3.5–5.2)
RBC # BLD AUTO: 2.9 10*6/MM3 (ref 4.14–5.8)
SODIUM SERPL-SCNC: 130 MMOL/L (ref 136–145)
WBC NRBC COR # BLD AUTO: 8.75 10*3/MM3 (ref 3.4–10.8)

## 2024-02-23 PROCEDURE — 97110 THERAPEUTIC EXERCISES: CPT

## 2024-02-23 PROCEDURE — 82948 REAGENT STRIP/BLOOD GLUCOSE: CPT | Performed by: INTERNAL MEDICINE

## 2024-02-23 PROCEDURE — 63710000001 INSULIN LISPRO (HUMAN) PER 5 UNITS: Performed by: INTERNAL MEDICINE

## 2024-02-23 PROCEDURE — 97530 THERAPEUTIC ACTIVITIES: CPT

## 2024-02-23 PROCEDURE — 94761 N-INVAS EAR/PLS OXIMETRY MLT: CPT

## 2024-02-23 PROCEDURE — 99222 1ST HOSP IP/OBS MODERATE 55: CPT | Performed by: PSYCHIATRY & NEUROLOGY

## 2024-02-23 PROCEDURE — 99231 SBSQ HOSP IP/OBS SF/LOW 25: CPT | Performed by: INTERNAL MEDICINE

## 2024-02-23 PROCEDURE — 82550 ASSAY OF CK (CPK): CPT | Performed by: INTERNAL MEDICINE

## 2024-02-23 PROCEDURE — 94799 UNLISTED PULMONARY SVC/PX: CPT

## 2024-02-23 PROCEDURE — 80048 BASIC METABOLIC PNL TOTAL CA: CPT | Performed by: INTERNAL MEDICINE

## 2024-02-23 PROCEDURE — 84100 ASSAY OF PHOSPHORUS: CPT | Performed by: INTERNAL MEDICINE

## 2024-02-23 PROCEDURE — 82948 REAGENT STRIP/BLOOD GLUCOSE: CPT

## 2024-02-23 PROCEDURE — 99233 SBSQ HOSP IP/OBS HIGH 50: CPT | Performed by: INTERNAL MEDICINE

## 2024-02-23 PROCEDURE — 83735 ASSAY OF MAGNESIUM: CPT | Performed by: INTERNAL MEDICINE

## 2024-02-23 PROCEDURE — 85025 COMPLETE CBC W/AUTO DIFF WBC: CPT | Performed by: INTERNAL MEDICINE

## 2024-02-23 PROCEDURE — 70551 MRI BRAIN STEM W/O DYE: CPT

## 2024-02-23 PROCEDURE — 94664 DEMO&/EVAL PT USE INHALER: CPT

## 2024-02-23 PROCEDURE — 25010000002 BUMETANIDE PER 0.5 MG: Performed by: INTERNAL MEDICINE

## 2024-02-23 RX ORDER — ALBUTEROL SULFATE 90 UG/1
AEROSOL, METERED RESPIRATORY (INHALATION)
Qty: 18 G | Refills: 11 | Status: SHIPPED | OUTPATIENT
Start: 2024-02-23

## 2024-02-23 RX ORDER — TRAZODONE HYDROCHLORIDE 50 MG/1
50 TABLET ORAL NIGHTLY
Status: DISCONTINUED | OUTPATIENT
Start: 2024-02-23 | End: 2024-02-25 | Stop reason: HOSPADM

## 2024-02-23 RX ORDER — LEVETIRACETAM 500 MG/1
500 TABLET ORAL 2 TIMES DAILY
Status: DISCONTINUED | OUTPATIENT
Start: 2024-02-23 | End: 2024-02-25 | Stop reason: HOSPADM

## 2024-02-23 RX ADMIN — INSULIN LISPRO 4 UNITS: 100 INJECTION, SOLUTION INTRAVENOUS; SUBCUTANEOUS at 21:22

## 2024-02-23 RX ADMIN — CETIRIZINE HYDROCHLORIDE 10 MG: 10 TABLET, FILM COATED ORAL at 11:00

## 2024-02-23 RX ADMIN — CARVEDILOL 25 MG: 25 TABLET, FILM COATED ORAL at 10:59

## 2024-02-23 RX ADMIN — ACETAMINOPHEN 650 MG: 325 TABLET ORAL at 06:35

## 2024-02-23 RX ADMIN — Medication 10 ML: at 11:08

## 2024-02-23 RX ADMIN — BUDESONIDE 0.5 MG: 0.5 INHALANT ORAL at 19:40

## 2024-02-23 RX ADMIN — MONTELUKAST 10 MG: 10 TABLET, FILM COATED ORAL at 21:13

## 2024-02-23 RX ADMIN — APIXABAN 5 MG: 5 TABLET, FILM COATED ORAL at 21:13

## 2024-02-23 RX ADMIN — BUDESONIDE 0.5 MG: 0.5 INHALANT ORAL at 07:48

## 2024-02-23 RX ADMIN — APIXABAN 5 MG: 5 TABLET, FILM COATED ORAL at 10:59

## 2024-02-23 RX ADMIN — LEVETIRACETAM 500 MG: 500 TABLET, FILM COATED ORAL at 21:13

## 2024-02-23 RX ADMIN — Medication 10 ML: at 21:13

## 2024-02-23 RX ADMIN — PSYLLIUM HUSK 1 PACKET: 3.4 POWDER ORAL at 11:01

## 2024-02-23 RX ADMIN — FOLIC ACID 1 MG: 1 TABLET ORAL at 10:59

## 2024-02-23 RX ADMIN — ARFORMOTEROL TARTRATE 15 MCG: 15 SOLUTION RESPIRATORY (INHALATION) at 19:39

## 2024-02-23 RX ADMIN — SODIUM HYPOCHLORITE: 1.25 SOLUTION TOPICAL at 21:14

## 2024-02-23 RX ADMIN — LEVETIRACETAM 500 MG: 500 TABLET, FILM COATED ORAL at 11:00

## 2024-02-23 RX ADMIN — DULOXETINE HYDROCHLORIDE 30 MG: 30 CAPSULE, DELAYED RELEASE ORAL at 11:00

## 2024-02-23 RX ADMIN — PANTOPRAZOLE SODIUM 40 MG: 40 TABLET, DELAYED RELEASE ORAL at 06:36

## 2024-02-23 RX ADMIN — IPRATROPIUM BROMIDE AND ALBUTEROL SULFATE 3 ML: .5; 3 SOLUTION RESPIRATORY (INHALATION) at 07:48

## 2024-02-23 RX ADMIN — BUMETANIDE 2 MG: 0.25 INJECTION INTRAMUSCULAR; INTRAVENOUS at 11:00

## 2024-02-23 RX ADMIN — NIFEDIPINE 30 MG: 30 TABLET, FILM COATED, EXTENDED RELEASE ORAL at 06:35

## 2024-02-23 RX ADMIN — TRAZODONE HYDROCHLORIDE 50 MG: 50 TABLET ORAL at 21:13

## 2024-02-23 RX ADMIN — ARFORMOTEROL TARTRATE 15 MCG: 15 SOLUTION RESPIRATORY (INHALATION) at 07:48

## 2024-02-23 RX ADMIN — INSULIN LISPRO 8 UNITS: 100 INJECTION, SOLUTION INTRAVENOUS; SUBCUTANEOUS at 12:41

## 2024-02-23 RX ADMIN — CARVEDILOL 25 MG: 25 TABLET, FILM COATED ORAL at 21:13

## 2024-02-23 NOTE — PLAN OF CARE
Goal Outcome Evaluation:      Pt alert and oriented x3, disoriented to situation at times. Wound care completed per order. Pt not turning independently, started q2h turns. No concerns at this time.  Adeola Haines RN

## 2024-02-23 NOTE — THERAPY TREATMENT NOTE
Acute Care - Physical Therapy Treatment Note  [facility]     Patient Name: Preston Wallis  : 1965  MRN: [id number]  Today's Date: 2024      Visit Dx:     ICD-10-CM ICD-9-CM   1. Decreased activities of daily living (ADL)  Z78.9 V49.89   2. Difficulty walking  R26.2 719.7     Patient Active Problem List   Diagnosis    Polyneuropathy    Paroxysmal atrial fibrillation    Obstructive sleep apnea    MRSA pneumonia    Low back pain    Chronic diastolic heart failure    Allergies    COPD exacerbation    Chronic anticoagulation    Benign prostatic hyperplasia    Impaired mobility and endurance    Stage 3a chronic kidney disease    Iron deficiency anemia secondary to inadequate dietary iron intake    Vitamin D deficiency    Class 3 severe obesity with serious comorbidity in adult    Lower extremity edema    Elevated alkaline phosphatase level    Venous insufficiency (chronic) (peripheral)    Tobacco abuse, in remission    History of Pseudomonas pneumonia    Chronic dyspnea    Gastroesophageal reflux disease    Bronchiectasis without complication    ERIC (acute kidney injury)    Altered mental status    Hyperlipidemia    Luetscher's syndrome    Neurogenic bladder    Class 1 obesity    Pneumonia due to Pseudomonas species    Seizures    Primary osteoarthritis of left knee    Other constipation    Chronic obstructive pulmonary disease    Type 2 DM with CKD stage 3 and hypertension    Essential hypertension    Stage 3b chronic kidney disease    Annual physical exam    Long-term use of high-risk medication    Personal history of PE (pulmonary embolism)    Encounter for aftercare for healing closed traumatic fracture of left femur    Chronic pain of left knee    Primary osteoarthritis of right knee    Sepsis    Bronchiectasis    Bacterial pneumonia    Anemia    Closed fracture of left tibial plateau    Septic joint    Septic arthritis    Skin ulcer of left heel, limited to breakdown of skin    Ulcer of left foot, limited  to breakdown of skin    Left foot pain     Past Medical History:   Diagnosis Date    Age-related cognitive decline     Allergic contact dermatitis     Allergies     Anemia     Bronchiectasis with acute lower respiratory infection     Charcot foot due to diabetes mellitus 9/10/2013    Chronic diastolic (congestive) heart failure     Chronic kidney disease     Chronic respiratory failure with hypoxia     Closed supracondylar fracture of femur 1/12/2022    COPD (chronic obstructive pulmonary disease)     Deep vein thrombosis (DVT) of lower extremity associated with air travel 1/13/2023    Dependence on supplemental oxygen     Eczema     Erectile dysfunction     due to organic reasons    Essential (primary) hypertension     Fracture     closed fracture of other tarsal and metatarsal bones    Fracture of proximal humerus 1/13/2023    GERD without esophagitis     High risk medication use     Hypercholesteremia     Hypomagnesemia     Infected stasis ulcer of left lower extremity 1/13/2023    Insomnia     Low back pain     Major depressive disorder     Morbid (severe) obesity due to excess calories     MRSA pneumonia     Muscle weakness     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 without evidence of necrosis     Obstructive sleep apnea (adult) (pediatric)     Other forms of dyspnea     Other long term (current) drug therapy     Other specified noninfective gastroenteritis and colitis     Other spondylosis, lumbar region     Pain in both knees     Paroxysmal atrial fibrillation     Peripheral neuropathy     attributed to type 2 diabetes    Pneumonia, unspecified organism     Polyneuropathy     Rash and other nonspecific skin eruption     Syncope and collapse     Tachycardia     Tinnitus 1/13/2023    Type 1 diabetes mellitus with diabetic chronic kidney disease     Type 2 diabetes mellitus     Unspecified fall, initial encounter     Urinary retention      Past Surgical History:    Procedure Laterality Date    CHOLECYSTECTOMY      CYSTOSCOPY      FEMUR SURGERY Left     Shravan placed    KNEE SURGERY Left     OTHER SURGICAL HISTORY Left     venous port    TIBIAL PLATEAU OPEN REDUCTION INTERNAL FIXATION Left 12/22/2023    Procedure: TIBIAL PLATEAU OPEN REDUCTION INTERNAL FIXATION;  Surgeon: Hugo Kline MD;  Location: [facility];  Service: Orthopedics;  Laterality: Left;    TONSILLECTOMY AND ADENOIDECTOMY       PT Assessment (last 12 hours)       PT Evaluation and Treatment       Row Name 02/23/24 1400          Physical Therapy Time and Intention    Subjective Information complains of;pain  -AV     Document Type therapy note (daily note)  -AV     Mode of Treatment individual therapy;physical therapy  -AV       Row Name 02/23/24 1400          Mobility    Extremity Weight-bearing Status left lower extremity  -AV     Left Lower Extremity (Weight-bearing Status) non weight-bearing (NWB)  -AV       Row Name 02/23/24 1400          Bed Mobility    Comment, (Bed Mobility) Patient initially declined all therapy as he had just worked with OT. With encouragement, patient agreeable to supine exercises.  -AV       Row Name 02/23/24 1400          Motor Skills    Therapeutic Exercise hip;knee;ankle  -AV       Row Name 02/23/24 1400          Hip (Therapeutic Exercise)    Hip (Therapeutic Exercise) AAROM (active assistive range of motion)  -AV     Hip AAROM (Therapeutic Exercise) bilateral;flexion;extension;aBduction;aDduction;supine;2 sets;10 repetitions  -AV       Row Name 02/23/24 1400          Knee (Therapeutic Exercise)    Knee (Therapeutic Exercise) AAROM (active assistive range of motion);isometric exercises  -AV     Knee AAROM (Therapeutic Exercise) bilateral;flexion;extension;supine;2 sets;10 repetitions  -AV     Knee Isometrics (Therapeutic Exercise) bilateral;quad sets;supine;2 sets;10 repetitions  -AV       Row Name 02/23/24 1400          Ankle (Therapeutic Exercise)    Ankle  (Therapeutic Exercise) AROM (active range of motion)  -AV     Ankle AROM (Therapeutic Exercise) bilateral;dorsiflexion;plantarflexion;supine;2 sets;10 repetitions  -AV       Row Name             Wound 12/22/23 1322 Left posterior heel Pressure Injury    Wound - Properties Group Placement Date: 12/22/23  - Placement Time: 1322  -LH Side: Left  -LH Orientation: posterior  -LH Location: heel  -LH Primary Wound Type: Pressure inj  -LH    Retired Wound - Properties Group Placement Date: 12/22/23  - Placement Time: 1322  -LH Side: Left  -LH Orientation: posterior  -LH Location: heel  -LH Primary Wound Type: Pressure inj  -LH    Retired Wound - Properties Group Date first assessed: 12/22/23  - Time first assessed: 1322  -LH Side: Left  -LH Location: heel  -LH Primary Wound Type: Pressure inj  -LH      Row Name             Wound 02/16/24 1700 Left posterior foot Pressure Injury    Wound - Properties Group Placement Date: 02/16/24  -AG Placement Time: 1700  -AG Side: Left  -AG Orientation: posterior  -AG Location: foot  -AG Primary Wound Type: Pressure inj  -AG    Retired Wound - Properties Group Placement Date: 02/16/24  -AG Placement Time: 1700  -AG Side: Left  -AG Orientation: posterior  -AG Location: foot  -AG Primary Wound Type: Pressure inj  -AG    Retired Wound - Properties Group Date first assessed: 02/16/24  -AG Time first assessed: 1700  -AG Side: Left  -AG Location: foot  -AG Primary Wound Type: Pressure inj  -AG      Row Name             Wound Left gluteal    Wound - Properties Group Present on Original Admission: Y  -KN Side: Left  -KN Location: gluteal  -KN    Retired Wound - Properties Group Present on Original Admission: Y  -KN Side: Left  -KN Location: gluteal  -KN    Retired Wound - Properties Group Present on Original Admission: Y  -KN Side: Left  -KN Location: gluteal  -KN      Row Name 02/23/24 1400          Progress Summary (PT)    Progress Toward Functional Goals (PT) progress toward functional  goals is fair  -AV               User Key  (r) = Recorded By, (t) = Taken By, (c) = Cosigned By      Initials Name Provider Type     Carmen Nava, RN Registered Nurse    Maria Isabel Sylvester, RN Registered Nurse    Cindy Lorenzo RN Registered Nurse    Andrea Henry, PT Physical Therapist                    Physical Therapy Education       Title: PT OT SLP Therapies (In Progress)       Topic: Physical Therapy (In Progress)       Point: Mobility training (In Progress)       Learning Progress Summary             Patient Acceptance, E,TB, NR by KT at 2/21/2024 2227    Acceptance, E,TB, NR by AV at 2/19/2024 1535                         Point: Home exercise program (In Progress)       Learning Progress Summary             Patient Acceptance, E,TB, NR by KT at 2/21/2024 2227                         Point: Body mechanics (In Progress)       Learning Progress Summary             Patient Acceptance, E,TB, NR by KT at 2/21/2024 2227    Acceptance, E,TB, NR by AV at 2/19/2024 1535                         Point: Precautions (In Progress)       Learning Progress Summary             Patient Acceptance, E,TB, NR by KT at 2/21/2024 2227    Acceptance, E,TB, NR by AV at 2/19/2024 1535                                         User Key       Initials Effective Dates Name Provider Type Discipline    AV 06/11/21 -  Andrea Ruiz, EMMANUELLE Physical Therapist PT    KT 10/09/23 -  Domi Carlos, RN Registered Nurse Nurse                  PT Recommendation and Plan  Anticipated Discharge Disposition (PT): sub acute care setting  Planned Therapy Interventions (PT): balance training, bed mobility training, home exercise program, neuromuscular re-education, strengthening, transfer training, gait training  Therapy Frequency (PT): daily  Progress Summary (PT)  Progress Toward Functional Goals (PT): progress toward functional goals is fair  Plan of Care Reviewed With: patient, family  Progress: no change  Outcome Evaluation: Patient  presents with deficits in balance, endurance, strength, and transfers. Patient will benefit from skilled PT services to address these mobility deficits and decrease risk of falls.   Outcome Measures       Row Name 02/23/24 1400             How much help from another person do you currently need...    Turning from your back to your side while in flat bed without using bedrails? 3  -AV      Moving from lying on back to sitting on the side of a flat bed without bedrails? 2  -AV      Moving to and from a bed to a chair (including a wheelchair)? 1  -AV      Standing up from a chair using your arms (e.g., wheelchair, bedside chair)? 1  -AV      Climbing 3-5 steps with a railing? 1  -AV      To walk in hospital room? 1  -AV      AM-PAC 6 Clicks Score (PT) 9  -AV      Highest Level of Mobility Goal 3 --> Sit at edge of bed  -AV         Functional Assessment    Outcome Measure Options AM-PAC 6 Clicks Basic Mobility (PT)  -AV                User Key  (r) = Recorded By, (t) = Taken By, (c) = Cosigned By      Initials Name Provider Type    AV Andrea Ruiz, PT Physical Therapist                     Time Calculation:    PT Charges       Row Name 02/23/24 1408             Time Calculation    PT Received On 02/23/24  -AV         Timed Charges    13393 - PT Therapeutic Exercise Minutes 10  -AV         Total Minutes    Timed Charges Total Minutes 10  -AV       Total Minutes 10  -AV                User Key  (r) = Recorded By, (t) = Taken By, (c) = Cosigned By      Initials Name Provider Type    Andrea Henry, PT Physical Therapist                  Therapy Charges for Today       Code Description Service Date Service Provider Modifiers Qty    [id number]  PT THER PROC EA 15 MIN 2/23/2024 Andrea Ruiz, PT GP 1            PT G-Codes  Outcome Measure Options: AM-PAC 6 Clicks Basic Mobility (PT)  AM-PAC 6 Clicks Score (PT): 9  AM-PAC 6 Clicks Score (OT): 14    Andrea Ruiz PT  2/23/2024

## 2024-02-23 NOTE — THERAPY TREATMENT NOTE
Patient Name: Preston Wallis  : 1965    MRN: [id number]                              Today's Date: 2024       Admit Date: 2024    Visit Dx:     ICD-10-CM ICD-9-CM   1. Decreased activities of daily living (ADL)  Z78.9 V49.89   2. Difficulty walking  R26.2 719.7     Patient Active Problem List   Diagnosis    Polyneuropathy    Paroxysmal atrial fibrillation    Obstructive sleep apnea    MRSA pneumonia    Low back pain    Chronic diastolic heart failure    Allergies    COPD exacerbation    Chronic anticoagulation    Benign prostatic hyperplasia    Impaired mobility and endurance    Stage 3a chronic kidney disease    Iron deficiency anemia secondary to inadequate dietary iron intake    Vitamin D deficiency    Class 3 severe obesity with serious comorbidity in adult    Lower extremity edema    Elevated alkaline phosphatase level    Venous insufficiency (chronic) (peripheral)    Tobacco abuse, in remission    History of Pseudomonas pneumonia    Chronic dyspnea    Gastroesophageal reflux disease    Bronchiectasis without complication    ERIC (acute kidney injury)    Altered mental status    Hyperlipidemia    Luetscher's syndrome    Neurogenic bladder    Class 1 obesity    Pneumonia due to Pseudomonas species    Seizures    Primary osteoarthritis of left knee    Other constipation    Chronic obstructive pulmonary disease    Type 2 DM with CKD stage 3 and hypertension    Essential hypertension    Stage 3b chronic kidney disease    Annual physical exam    Long-term use of high-risk medication    Personal history of PE (pulmonary embolism)    Encounter for aftercare for healing closed traumatic fracture of left femur    Chronic pain of left knee    Primary osteoarthritis of right knee    Sepsis    Bronchiectasis    Bacterial pneumonia    Anemia    Closed fracture of left tibial plateau    Septic joint    Septic arthritis    Skin ulcer of left heel, limited to breakdown of skin    Ulcer of left foot, limited to  breakdown of skin    Left foot pain     Past Medical History:   Diagnosis Date    Age-related cognitive decline     Allergic contact dermatitis     Allergies     Anemia     Bronchiectasis with acute lower respiratory infection     Charcot foot due to diabetes mellitus 9/10/2013    Chronic diastolic (congestive) heart failure     Chronic kidney disease     Chronic respiratory failure with hypoxia     Closed supracondylar fracture of femur 1/12/2022    COPD (chronic obstructive pulmonary disease)     Deep vein thrombosis (DVT) of lower extremity associated with air travel 1/13/2023    Dependence on supplemental oxygen     Eczema     Erectile dysfunction     due to organic reasons    Essential (primary) hypertension     Fracture     closed fracture of other tarsal and metatarsal bones    Fracture of proximal humerus 1/13/2023    GERD without esophagitis     High risk medication use     Hypercholesteremia     Hypomagnesemia     Infected stasis ulcer of left lower extremity 1/13/2023    Insomnia     Low back pain     Major depressive disorder     Morbid (severe) obesity due to excess calories     MRSA pneumonia     Muscle weakness     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 without evidence of necrosis     Obstructive sleep apnea (adult) (pediatric)     Other forms of dyspnea     Other long term (current) drug therapy     Other specified noninfective gastroenteritis and colitis     Other spondylosis, lumbar region     Pain in both knees     Paroxysmal atrial fibrillation     Peripheral neuropathy     attributed to type 2 diabetes    Pneumonia, unspecified organism     Polyneuropathy     Rash and other nonspecific skin eruption     Syncope and collapse     Tachycardia     Tinnitus 1/13/2023    Type 1 diabetes mellitus with diabetic chronic kidney disease     Type 2 diabetes mellitus     Unspecified fall, initial encounter     Urinary retention      Past Surgical History:    Procedure Laterality Date    CHOLECYSTECTOMY      CYSTOSCOPY      FEMUR SURGERY Left     Shravan placed    KNEE SURGERY Left     OTHER SURGICAL HISTORY Left     venous port    TIBIAL PLATEAU OPEN REDUCTION INTERNAL FIXATION Left 12/22/2023    Procedure: TIBIAL PLATEAU OPEN REDUCTION INTERNAL FIXATION;  Surgeon: Hugo Kline MD;  Location: [facility] OR;  Service: Orthopedics;  Laterality: Left;    TONSILLECTOMY AND ADENOIDECTOMY        General Information       Row Name 02/23/24 1436          OT Time and Intention    Document Type therapy note (daily note)  -     Mode of Treatment individual therapy;occupational therapy  -               User Key  (r) = Recorded By, (t) = Taken By, (c) = Cosigned By      Initials Name Provider Type     Lisa Miranda OT Occupational Therapist                     Mobility/ADL's       Row Name 02/23/24 1436          Bed Mobility    Bed Mobility supine-sit;sit-supine  -     Supine-Sit [place] (Bed Mobility) moderate assist (50% patient effort)  -     Sit-Supine [place] (Bed Mobility) moderate assist (50% patient effort)  -     Bed Mobility, Safety Issues decreased use of legs for bridging/pushing;decreased use of arms for pushing/pulling  -     Assistive Device (Bed Mobility) head of bed elevated;draw sheet  -     Comment, (Bed Mobility) Patient tolerated sitting EOB for duration of BUE exercises, he declined attempting further functional transfers.  -       Row Name 02/23/24 1436          Mobility    Extremity Weight-bearing Status left lower extremity  -LF     Left Lower Extremity (Weight-bearing Status) non weight-bearing (NWB)  -               User Key  (r) = Recorded By, (t) = Taken By, (c) = Cosigned By      Initials Name Provider Type     Lisa Miranda OT Occupational Therapist                   Obj/Interventions       Row Name 02/23/24 1437          Shoulder (Therapeutic Exercise)    Shoulder (Therapeutic Exercise) AAROM (active  assistive range of motion);strengthening exercise  -     Shoulder AAROM (Therapeutic Exercise) right  Horizontal abduction/adduction and foreward reach, 2 sets x 10 reps  -LF     Shoulder Strengthening (Therapeutic Exercise) left;resistance band;yellow  Horizontal abduction/adduction and foreward reach, 2 sets x 10 reps  -       Row Name 02/23/24 1437          Elbow/Forearm (Therapeutic Exercise)    Elbow/Forearm (Therapeutic Exercise) AROM (active range of motion);strengthening exercise  -     Elbow/Forearm AROM (Therapeutic Exercise) right;flexion;extension;2 sets;10 repetitions  -     Elbow/Forearm Strengthening (Therapeutic Exercise) left;flexion;extension;resistance band;yellow;2 sets;10 repetitions  -       Row Name 02/23/24 1437          Motor Skills    Motor Skills functional endurance  -     Functional Endurance Fair-/fair  -LF     Therapeutic Exercise shoulder;elbow/forearm  -       Row Name 02/23/24 1437          Balance    Balance Assessment sitting dynamic balance  -     Dynamic Sitting Balance supervision  -     Position, Sitting Balance unsupported;sitting edge of bed  -     Balance Interventions sitting;dynamic;UE activity with balance activity;weight shifting activity  -               User Key  (r) = Recorded By, (t) = Taken By, (c) = Cosigned By      Initials Name Provider Type     Lisa Miranda OT Occupational Therapist                   Goals/Plan    No documentation.                  Clinical Impression       Row Name 02/23/24 2294          Pain Assessment    Additional Documentation Pain Scale: FACES Pre/Post-Treatment (Group)  -[facility] Name 02/23/24 0172          Pain Scale: FACES Pre/Post-Treatment    Pain: FACES Scale, Pretreatment 0-->no hurt  -     Posttreatment Pain Rating 0-->no hurt  -       Row Name 02/23/24 1040          Plan of Care Review    Plan of Care Reviewed With patient  -     Progress improving  -     Outcome Evaluation Patient  agreeable to bed mobility, in preparation for BUE exercises on EOB, demonstrating fair endurance. Mod cues throughout for proper body mechanics d/t exhibiting compensatory techniques, causing strain to trunk/core muscles. He would benefit from continued OT services to maximize independence.  -       Row Name 02/23/24 1452          Vital Signs    O2 Delivery Pre Treatment nasal cannula  -LF     O2 Delivery Intra Treatment nasal cannula  -LF     O2 Delivery Post Treatment nasal cannula  -LF       Row Name 02/23/24 1452          Positioning and Restraints    Pre-Treatment Position in bed  -LF     Post Treatment Position bed  -LF     In Bed fowlers;call light within reach;encouraged to call for assist;exit alarm on  -LF               User Key  (r) = Recorded By, (t) = Taken By, (c) = Cosigned By      Initials Name Provider Type     Lisa Miranda OT Occupational Therapist                   Outcome Measures       Row Name 02/23/24 6237          How much help from another is currently needed...    Putting on and taking off regular lower body clothing? 1  -LF     Bathing (including washing, rinsing, and drying) 2  -LF     Toileting (which includes using toilet bed pan or urinal) 1  -LF     Putting on and taking off regular upper body clothing 3  -LF     Taking care of personal grooming (such as brushing teeth) 3  -LF     Eating meals 4  -LF     AM-PAC 6 Clicks Score (OT) 14  -       Row Name 02/23/24 1400          How much help from another person do you currently need...    Turning from your back to your side while in flat bed without using bedrails? 3  -AV     Moving from lying on back to sitting on the side of a flat bed without bedrails? 2  -AV     Moving to and from a bed to a chair (including a wheelchair)? 1  -AV     Standing up from a chair using your arms (e.g., wheelchair, bedside chair)? 1  -AV     Climbing 3-5 steps with a railing? 1  -AV     To walk in hospital room? 1  -AV     AM-PAC 6 Clicks Score  (PT) 9  -AV     Highest Level of Mobility Goal 3 --> Sit at edge of bed  -AV       Row Name 02/23/24 1457 02/23/24 1400       Functional Assessment    Outcome Measure Options AM-PAC 6 Clicks Daily Activity (OT);Optimal Instrument  -LF AM-PAC 6 Clicks Basic Mobility (PT)  -AV      Row Name 02/23/24 1457          Optimal Instrument    Optimal Instrument Optimal - 3  -LF     Bending/Stooping 4  -LF     Standing 5  -LF     Reaching 2  -LF     From the list, choose the 3 activities you would most like to be able to do without any difficulty Bending/stooping;Standing;Reaching  -LF     Total Score Optimal - 3 11  -LF               User Key  (r) = Recorded By, (t) = Taken By, (c) = Cosigned By      Initials Name Provider Type    LF Lisa Miranda, OT Occupational Therapist    AV Andrea Ruiz, PT Physical Therapist                    Occupational Therapy Education       Title: PT OT SLP Therapies (In Progress)       Topic: Occupational Therapy (In Progress)       Point: ADL training (In Progress)       Description:   Instruct learner(s) on proper safety adaptation and remediation techniques during self care or transfers.   Instruct in proper use of assistive devices.                  Learning Progress Summary             Patient Acceptance, E,TB, NR by KT at 2/21/2024 2227    Acceptance, E,TB, NR by LF at 2/19/2024 1534                         Point: Home exercise program (In Progress)       Description:   Instruct learner(s) on appropriate technique for monitoring, assisting and/or progressing therapeutic exercises/activities.                  Learning Progress Summary             Patient Acceptance, E,TB, NR by KT at 2/21/2024 2227                         Point: Precautions (In Progress)       Description:   Instruct learner(s) on prescribed precautions during self-care and functional transfers.                  Learning Progress Summary             Patient Acceptance, E,TB, NR by KT at 2/21/2024 2227    Acceptance,  E,TB, NR by  at 2/19/2024 1534                         Point: Body mechanics (In Progress)       Description:   Instruct learner(s) on proper positioning and spine alignment during self-care, functional mobility activities and/or exercises.                  Learning Progress Summary             Patient Acceptance, E,TB, NR by  at 2/21/2024 2227    Acceptance, E,TB, NR by  at 2/19/2024 1534                                         User Key       Initials Effective Dates Name Provider Type Discipline     06/16/21 -  Lisa Miranda OT Occupational Therapist OT     10/09/23 -  Domi Carlos RN Registered Nurse Nurse                  OT Recommendation and Plan  Planned Therapy Interventions (OT): activity tolerance training, BADL retraining, functional balance retraining, occupation/activity based interventions, patient/caregiver education/training, strengthening exercise, transfer/mobility retraining  Therapy Frequency (OT): 5 times/wk  Plan of Care Review  Plan of Care Reviewed With: patient  Progress: improving  Outcome Evaluation: Patient agreeable to bed mobility, in preparation for BUE exercises on EOB, demonstrating fair endurance. Mod cues throughout for proper body mechanics d/t exhibiting compensatory techniques, causing strain to trunk/core muscles. He would benefit from continued OT services to maximize independence.     Time Calculation:   Evaluation Complexity (OT)  Review Occupational Profile/Medical/Therapy History Complexity: brief/low complexity  Assessment, Occupational Performance/Identification of Deficit Complexity: 3-5 performance deficits  Clinical Decision Making Complexity (OT): problem focused assessment/low complexity  Overall Complexity of Evaluation (OT): low complexity     Time Calculation- OT       Row Name 02/23/24 1457             Time Calculation- OT    OT Received On 02/23/24  -      OT Goal Re-Cert Due Date 02/28/24  -         Timed Charges    19438 - OT Therapeutic  Exercise Minutes 13  -LF      42999 - OT Therapeutic Activity Minutes 10  -LF         Total Minutes    Timed Charges Total Minutes 23  -LF       Total Minutes 23  -LF                User Key  (r) = Recorded By, (t) = Taken By, (c) = Cosigned By      Initials Name Provider Type    Lisa Sheffield OT Occupational Therapist                  Therapy Charges for Today       Code Description Service Date Service Provider Modifiers Qty    [id number]  OT THER PROC EA 15 MIN 2/23/2024 Lisa Miranda OT GO 1    [id number]  OT THERAPEUTIC ACT EA 15 MIN 2/23/2024 Lisa Miranda OT GO 1                 Lisa Miranda OT  2/23/2024

## 2024-02-23 NOTE — PROGRESS NOTES
[facility]   Hospitalist Progress Note    Date of admission: 2/16/2024  Patient Name: Preston Wallis  1965  Date: 2/23/2024      Subjective     No chief complaint on file.    Interval Followup:    Patient appears almost back to normal.  He is alert and oriented however does not remember the events of the prior days.  Patient denies any further chest pain.  Echocardiogram shows diastolic dysfunction.  No further workup planned per cardiology.  Patient had an MRI of his brain which showed a small remote lacunar infarct within the basal ganglia on the right but no acute changes.  Patient was seen by neurology patient did undergo an EEG that did note a left to form discharges consistent with possible seizures.  Neurology is recommending starting patient on Keppra 500 twice daily.  Neurology also recommending to hold aspirin given his chronic lacunar infarct on MR I and the fact that he is already on Eliquis.    Patient still unable to tolerate bipap machine due to anxiety       ROS:   All systems reviewed and negative at this time    Vitals:   Temp:  [97.2 °F (36.2 °C)-98.1 °F (36.7 °C)] 97.7 °F (36.5 °C)  Heart Rate:  [65-95] 79  Resp:  [16-18] 16  BP: ()/(53-78) 135/53  Flow (L/min):  [2] 2    Physical Exam  Chronic ill-appearing morbidly obese in bed. Resting in bed. No family at the bedside currently   HEENT: NC/AT  Resp: decreased and clear   CV: RRRAbdomen soft obese, has some petechiae from where it appears he has been scratching on his chest/abdomen, no obvious superficial skin rash  Left lower extremity wound dressed no discharge or drainage, dry skin on left leg  Neuro: no focal deficits noted     Result Review:  Vital signs, labs and recent relevant imaging reviewed.        acetaminophen    albuterol    aluminum-magnesium hydroxide-simethicone    benzonatate    senna-docusate sodium **AND** polyethylene glycol **AND** bisacodyl **AND** bisacodyl    dextrose    dextrose    Diclofenac Sodium     glucagon (human recombinant)    guaiFENesin-dextromethorphan    hydrALAZINE    ipratropium-albuterol    Lidocaine    melatonin    nicotine    ondansetron    prochlorperazine    sodium chloride    sodium chloride    sodium chloride    apixaban, 5 mg, Oral, Q12H  arformoterol, 15 mcg, Nebulization, BID - RT  budesonide, 0.5 mg, Nebulization, BID - RT  bumetanide, 2 mg, Intravenous, Daily  carvedilol, 25 mg, Oral, Q12H  cetirizine, 10 mg, Oral, Daily  collagenase, 1 Application, Topical, Q24H  DULoxetine, 30 mg, Oral, Daily  [Held by provider] ferrous sulfate, 325 mg, Oral, Daily With Breakfast  folic acid, 1 mg, Oral, Daily  insulin lispro, 4-24 Units, Subcutaneous, 4x Daily AC & at Bedtime  [Held by provider] losartan, 25 mg, Oral, Q24H  montelukast, 10 mg, Oral, Nightly  NIFEdipine XL, 30 mg, Oral, QAM  pantoprazole, 40 mg, Oral, Q AM  psyllium, 1 packet, Oral, Daily  senna-docusate sodium, 2 tablet, Oral, BID  sodium chloride, 10 mL, Intravenous, Q12H  Sodium Hypochlorite, , Topical, Q12H        MRI Brain Without Contrast    Result Date: 2/23/2024    1. Given motion limitations no acute ischemic change or acute intracranial abnormality noted 2. White matter changes most compatible small-vessel ischemic disease in this age group noted 3. Bilateral mastoid effusions      ALLAN ALBA MD       Electronically Signed and Approved By: ALLAN ALBA MD on 2/23/2024 at 8:06             CT Head Without Contrast    Result Date: 2/21/2024   No acute intracranial process identified.     LU RIVERA MD       Electronically Signed and Approved By: LU RIVERA MD on 2/21/2024 at 16:25             XR Chest 1 View    Result Date: 2/21/2024   No significant interval change allowing for rotation on the current study.       MIAH GREEN MD       Electronically Signed and Approved By: MIAH GREEN MD on 2/21/2024 at 2:07             XR Chest 1 View    Result Date: 2/20/2024   Stable appearance of the chest allowing  for difficulties with positioning.       MIAH GREEN MD       Electronically Signed and Approved By: MIAH GREEN MD on 2/20/2024 at 5:55             CT Head Without Contrast    Result Date: 2/17/2024   Motion degraded exam with no definite acute abnormalities.     MIAH GREEN MD       Electronically Signed and Approved By: MIAH GREEN MD on 2/17/2024 at 1:21             XR Chest 1 View    Result Date: 2/17/2024   No significant interval change.       MIAH GREEN MD       Electronically Signed and Approved By: MIAH GREEN MD on 2/17/2024 at 1:01             XR Chest 1 View    Result Date: 2/16/2024    1. There is borderline heart size with pulmonary vascular congestion 2. Chronic bilateral airspace disease       WHIT HARDIN MD       Electronically Signed and Approved By: WHIT HARDIN MD on 2/16/2024 at 16:52             XR chest AP portable    Result Date: 2/16/2024  No significant interval change. Images personally reviewed, interpreted and dictated by SHANT Velasquez.           2/2/24 echo from OSH  Normal LV wall motion with estimated LV ejection fraction 50 to 55%   Mild concentric LVH   Normal valvular structure and function       Assessment / Plan     Summary: 58-year-old male morbidly obese COPD on 2 L baseline, CKD 3/4, MILADY not adherent to BiPAP, chronic nursing home resident who presented to the outside hospital on 2/11 with pneumonia.  Patient was recently admitted to [facility] with tibial plateau fracture.  Transferred here for further evaluation concerned that he may need podiatry intervention for foot infection.  Podiatry evaluated no acute intervention required apart from local wound care and some superficial debridement.  Patient with acute hypoxemic hypercapnic respiratory failure requiring continuous BiPAP initially has issues with nonadherence to this as outpatient.  Pulmonology and podiatry assisting.  Treated with IV antibiotics for foot infection and pneumonia.   Rehab versus home health for discharge.  Hopefully in the next 2 days.    Assessment/Plan (clinically significant if listed here)  Sepsis secondary to Pneumonia, bacterial  Acute hypoxemic hypercapnic respiratory failure  MILADY noncompliant with home bipap  Acute metabolic encephalopathy in setting of above  Diabetic foot Infection/SSTI, unspecified organism   Diastolic CHF with exacerbation 2/2024 from outside hospital EF 50%  CKD3/4 baseline creatinine ~1.4-1.7  Paroxysmal Afib on apixaban  CAD  Elevated troponin  COPD with exacerbation, baseline 2 L oxygen  Hx of DVT  IDDM2  Iron deficiency anemia   BPH  Hx of neurogenic bladder with chronic lopez  Chronic NH resident  Morbid Obesity  Polypharmacy   Old lacunar infarct   Possible seizure disorder     PLAN  Patient's white blood cell count is back to normal.  Cefepime has been discontinued as well as vancomycin and patient is currently on doxycycline  Cultures here are unremarkable for specific organism  Continue brov/pulm, scheduled nebs respiratory hygiene  Monitor sedation, continuous pulse ox, continue to encourage BiPAP use.  ABG done this morning and appears fine  Appreciate podiatry assistance, no additional surgical invention need further exam  Pulmonary following  CT of head was fine.  MRI of the brain did show old lacunar infarct however no new/acute infarct  Neurology following.  Patient has been started on Keppra given abnormal EEG  Cardiology following for chest pain.  Patient denies any further chest pain.  Echocardiogram shows diastolic dysfunction  Iv bumex  Continue Coreg, nifedipine  Continue insulin monitor  Wound care to LE   Off flomax/bph meds as pt with chronic catheter   PT/OT  Check a.m. CBC, BMP, magnesium, phosphorus  Continue hospitalization at current level of care, telemetry,   Dispo: rehab placement       DVT prophylaxis:  Medical DVT prophylaxis orders are present.        Code Status (Patient has no pulse and is not breathing): CPR  (Attempt to Resuscitate)  Medical Interventions (Patient has pulse or is breathing): Full Support        CBC          2/21/2024    16:59 2/22/2024    04:32 2/23/2024    04:18   CBC   WBC 11.95  12.33  8.75    RBC 3.08  3.05  2.90    Hemoglobin 8.3  8.2  7.8    Hematocrit 27.1  26.9  26.0    MCV 88.0  88.2  89.7    MCH 26.9  26.9  26.9    MCHC 30.6  30.5  30.0    RDW 15.9  15.8  16.2    Platelets 469  463  339        CMP          2/21/2024    16:59 2/22/2024    04:32 2/23/2024    04:18   CMP   Glucose 101  119  99    BUN 25  25  24    Creatinine 1.54  1.54  1.59    EGFR 52.0  52.0  50.0    Sodium 140  140  130    Potassium 4.4  4.4  3.8    Chloride 105  103  95    Calcium 8.7  8.9  8.4    BUN/Creatinine Ratio 16.2  16.2  15.1    Anion Gap 11.3  14.3  12.5

## 2024-02-23 NOTE — PROGRESS NOTES
[facility]     Cardiology Progress Note    Patient Name: Preston Wallis  : 1965  MRN: [id number]  Primary Care Physician:  Kimmy Riley MD  Date of admission: 2024    Subjective   Subjective     Chief Complaint: Follow-up visit for chest pain, paroxysmal atrial fibrillation    Interval HPI:    Patient reports intermittent confusion.  Denies any further episodes of chest pain.  No events on telemetry    Review of Systems   All systems were reviewed and negative except for: Confusion, fatigue.  Negative for chest pain or palpitations    Objective   Objective     Vitals:   Temp:  [97.2 °F (36.2 °C)-98.1 °F (36.7 °C)] 97.7 °F (36.5 °C)  Heart Rate:  [65-86] 79  Resp:  [16-18] 16  BP: (126-159)/(53-75) 135/53  Flow (L/min):  [2] 2  Physical Exam      General : Alert, awake, no acute distress  CVS : Regular rate and rhythm, no murmur, rubs or gallops  Lungs: Clear to auscultation bilaterally, no crackles or rhonchi  Abdomen: Soft, nontender, bowel sounds heard in all 4 quadrants  Extremities: Warm, well-perfused, left foot dressing, trace edema RLE    Scheduled Meds:apixaban, 5 mg, Oral, Q12H  arformoterol, 15 mcg, Nebulization, BID - RT  budesonide, 0.5 mg, Nebulization, BID - RT  bumetanide, 2 mg, Intravenous, Daily  carvedilol, 25 mg, Oral, Q12H  cetirizine, 10 mg, Oral, Daily  collagenase, 1 Application, Topical, Q24H  DULoxetine, 30 mg, Oral, Daily  [Held by provider] ferrous sulfate, 325 mg, Oral, Daily With Breakfast  folic acid, 1 mg, Oral, Daily  insulin lispro, 4-24 Units, Subcutaneous, 4x Daily AC & at Bedtime  levETIRAcetam, 500 mg, Oral, BID  [Held by provider] losartan, 25 mg, Oral, Q24H  montelukast, 10 mg, Oral, Nightly  NIFEdipine XL, 30 mg, Oral, QAM  pantoprazole, 40 mg, Oral, Q AM  psyllium, 1 packet, Oral, Daily  senna-docusate sodium, 2 tablet, Oral, BID  sodium chloride, 10 mL, Intravenous, Q12H  Sodium Hypochlorite, , Topical, Q12H  traZODone, 50 mg, Oral,  Nightly         Result Review    Result Review:  I have personally reviewed the results from the time of this admission to 2/23/2024 09:58 EST and agree with these findings:  [x]  Laboratory  []  Microbiology  [x]  Radiology  [x]  EKG/Telemetry   [x]  Cardiology/Vascular   []  Pathology  []  Old records  []  Other:  Most notable findings include:       CARDIAC LABS:      Lab 02/22/24  0905 02/22/24  0432 02/16/24  1707   PROBNP 947.8*  --  2,580.0*   HSTROP T 138* 148*  --    PROTIME  --   --  15.5*   INR  --   --  1.21*      Results for orders placed during the hospital encounter of 02/16/24    Adult Transthoracic Echo Complete W/ Cont if Necessary Per Protocol    Interpretation Summary    Left ventricular systolic function is normal. Left ventricular ejection fraction appears to be 61 - 65%.    Left ventricular wall thickness is consistent with mild concentric hypertrophy.    Left ventricular diastolic function is consistent with (grade I) impaired relaxation.    There are no significant valvular abnormalities.       Assessment & Plan   Assessment / Plan     Brief Patient Summary:  Preston Wallis is a 58 y.o. male with chronic diastolic heart failure, paroxysmal atrial fibrillation on chronic kidney disease, COPD, obstructive sleep apnea, hypertension, diabetes mellitus, neurogenic bladder, on intermittent self-catheterization.  Currently admitted with septic arthritis,  Pneumonia with sepsis and hypoxic hypercarbic respiratory failure.    Active Hospital Problems:  Active Hospital Problems    Diagnosis     **Septic joint     Septic arthritis     Skin ulcer of left heel, limited to breakdown of skin     Ulcer of left foot, limited to breakdown of skin     Left foot pain     Type 2 DM with CKD stage 3 and hypertension     Polyneuropathy      Chest pain : Symptoms are somewhat atypical, no recurrent episodes.  Echo showed preserved LV function with no regional wall motion abnormalities.  EKG unremarkable.  Mildly  elevated troponin likely represents type II MI from systemic illness and renal failure.     Paroxysmal atrial fibrillation : On anticoagulation at home, currently in normal sinus rhythm  Chronic diastolic heart failure : Currently on IV diuretics, proBNP near normal today, patient is near euvolemic on physical examination    Plan:     Currently on IV diuretics, to be changed to oral form on discharge  Continue beta-blockers, Eliquis  We will see as needed while inpatient please call with questions       CODE STATUS:   Code Status (Patient has no pulse and is not breathing): CPR (Attempt to Resuscitate)  Medical Interventions (Patient has pulse or is breathing): Full Support      Electronically signed by Lino Ware MD, 02/23/24, 9:58 AM EST.

## 2024-02-23 NOTE — PROGRESS NOTES
Respiratory Therapist Broncho-Pulmonary Hygiene Progress Note      Patient Name:  Preston Wallis  YOB: 1965    Preston Wallis meets the qualification for Level 1 of the Bronco-Pulmonary Hygiene Protocol. This was based on my daily patient assessment and includes review of chest x-ray results, cough ability and quality, oxygenation, secretions or risk for secretion development and patient mobility.     Broncho-Pulmonary Hygiene Assessment:    Level of Movement: Actively changing positions without assistance  Alert/ oriented/ cooperative  Borderline 2- improving  Breath Sounds: Diminished and/or coarse rhonchi wheezing  2  Cough: Strong, effective patient says he doesn't need to cough  1  Chest X-Ray: Mild consolidation and/or atelectasis.  2/3  Sputum Productions: None or small amount of thin or watery secretions with effective cough  1  History and Physical: New onset of bronchitis or existing chronic pulmonary conditions.  **(not in an exacerbation) and Home use of oxygen   1/2  SpO2 to Oxygen Need: greater than 92% on room air or  less than 3L nasal canula  1  Current SpO2 is: 99 on 2 L nasal cannula (home o2)    Based on this information, I have completed the following interventions: Teach/Instruct patient on cough and deep breathe      Electronically signed by Mamta Singh RRT, 02/23/24, 7:50 AM EST.

## 2024-02-23 NOTE — PROGRESS NOTES
Pulmonary / Critical Care Progress Note      Patient Name: Preston Wallis  : 1965  MRN: [id number]  Primary Care Physician:  Kimmy Riley MD  Date of admission: 2024    Subjective   Subjective   Follow-up for hypercapnic and hypoxemic respiratory failure requiring NIPPV    No acute events overnight.  Unable to tolerate NIPPV.    This morning,  Sitting up on side of bed  Currently on 2 L nasal cannula  Working with physical therapy  Mentation improved  Overall feeling better  Denies dyspnea  No fever or chills  No chest pain or hemoptysis      Objective   Objective     Vitals:   Temp:  [97.2 °F (36.2 °C)-98.1 °F (36.7 °C)] 97.7 °F (36.5 °C)  Heart Rate:  [65-95] 79  Resp:  [16-18] 16  BP: ()/(53-78) 135/53  Flow (L/min):  [2] 2    Physical Exam   Vital Signs Reviewed   General:  WDWN, NAD.  Chronically ill-appearing, obese male, l sitting on side of bed  Chest:  good aeration, clear to auscultation bilaterally, no work of breathing noted   CV: RRR, no MGR, pulses 2+, equal.  Abd:  Soft, NT, ND, + BS, no HSM, obese  EXT:  no clubbing, no cyanosis, no edema  Neuro:  A&Ox2-3, CN grossly intact, no focal deficits.  Skin: No rashes or lesions noted    Result Review    Result Review:  I have personally reviewed the results from the time of this admission to 2024 08:36 EST and agree with these findings:  [x]  Laboratory  [x]  Microbiology  [x]  Radiology  [x]  EKG/Telemetry   [x]  Cardiology/Vascular   []  Pathology  []  Old records  []  Other:  Most notable findings include:    ABG on room air 7.40/35/68      Lab 24  0418 24  0432 24  1659 24  2349 24  0448 24  0426 24  2236 24  0559 24  1632 24  0600 24  0117 24  0044 24  1707   WBC 8.75 12.33* 11.95* 10.21 10.00  --   --  10.72  --  12.53* 13.16*  --  14.84*   HEMOGLOBIN 7.8* 8.2* 8.3* 8.6* 7.9*  --   --  7.7* 7.5* 7.1* 8.1*  --  8.3*   HEMATOCRIT 26.0* 26.9*  27.1* 28.6* 26.6*  --   --  25.7* 24.7* 24.1* 26.7*  --  28.1*   PLATELETS 339 463* 469* 488* 464*  --   --  487*  --  454* 455*  --  491*   SODIUM 130* 140 140 137 137  --   --  135*  --  138 139  --  138   SODIUM, ARTERIAL  --   --   --   --   --  137.3 136.2  --   --   --   --    < >  --    POTASSIUM 3.8 4.4 4.4 4.1 4.3  --   --  4.6  --  4.4 4.3  --  5.2   CHLORIDE 95* 103 105 101 101  --   --  99  --  102 102  --  102   CO2 22.5 22.7 23.7 25.5 25.9  --   --  26.8  --  27.9 25.3  --  27.8   BUN 24* 25* 25* 23* 25*  --   --  27*  --  30* 31*  --  29*   CREATININE 1.59* 1.54* 1.54* 1.46* 1.56*  --   --  1.73*  --  1.76* 1.74*  --  1.78*   GLUCOSE 99 119* 101* 100* 130*  --   --  162*  --  130* 268*  --  281*   GLUCOSE, ARTERIAL  --   --   --   --   --  123* 153*  --   --   --   --    < >  --    CALCIUM 8.4* 8.9 8.7 8.8 8.7  --   --  8.8  --  8.5* 8.8  --  8.8   PHOSPHORUS 2.9 2.7  --  2.6 2.8  --   --  2.8  --  3.0 2.3*  --  2.9   URIC ACID  --  5.0  --   --   --   --   --   --   --   --   --   --   --    TOTAL PROTEIN  --   --   --  7.8  --   --   --   --   --   --   --   --  8.3   ALBUMIN  --   --   --  2.8*  --   --   --   --   --   --   --   --  2.8*   GLOBULIN  --   --   --  5.0  --   --   --   --   --   --   --   --  5.5    < > = values in this interval not displayed.     Assessment & Plan   Assessment / Plan     Active Hospital Problems:  Active Hospital Problems    Diagnosis     **Septic joint     Septic arthritis     Skin ulcer of left heel, limited to breakdown of skin     Ulcer of left foot, limited to breakdown of skin     Left foot pain     Type 2 DM with CKD stage 3 and hypertension     Polyneuropathy      Impression:   Acute on chronic hypoxemic and hypercapnic respiratory failure  Acute on chronic COPD exacerbation, FEV1 of 26%  CO2 narcosis respiratory  Acidosis  Elevated BNP, 2590  ERIC on CKD  History of recurrent Pseudomonas infection with MDRO Pseudomonas  History of PE in July 2019  Tobacco  abuse in remission  Altered mental status  Toxic encephalopathy     Plan:   -Patient's mentation has much improved.  -Neurology on board.  Appreciate assistance.  -CT of head without acute intracranial process noted.  -MRI brain with white matter changes compatible with small vessel ischemia associated with aging, no acute ischemic changes or acute intracranial abnormality noted  -Continue to maintain SpO2 greater than 90%  -Continue home NIPPV nightly with naps on current settings.  Continue to work to adjust settings for tolerability.  -Start trazodone 50 mg p.o. at bedtime  -RT  on board.  Appreciate assistance.  -Completed course of antibiotics.  -Continue Bumex 2 mg IV daily  -Continue to monitor renal panel and electrolytes.  Replace electrolytes as needed.  -Continue bronchopulmonary hygiene.  Encourage I-S and flutter  -Continue Brovana, Pulmicort, and DuoNebs  -Encourage mobilization.  Out of bed to chair  -Continue Eliquis for A-fib  -Rest of care per primary    DVT prophylaxis:  Medical DVT prophylaxis orders are present.    CODE STATUS:   Code Status (Patient has no pulse and is not breathing): CPR (Attempt to Resuscitate)  Medical Interventions (Patient has pulse or is breathing): Full Support      Labs, imaging, microbiology, notes and medications personally reviewed  Discussed with primary    I, Dr. Severiano Carolina, have spent more than 50% of the total time managing the patient in this encounter today.  This included personally reviewing all pertinent labs, imaging, microbiology and documentation. Also discussing the case with the patient and any available family, the admitting physician and any available ancillary staff.    Electronically signed by CON Camilo, 02/23/24, 2:06 PM EST.  Electronically signed by Severiano Carolina MD, 02/23/24, 3:01 PM EST.

## 2024-02-23 NOTE — PROGRESS NOTES
[facility]  [facility] TeleNeurology Consult Services    Routine Consult New    Patient Name:   Preston Wallis  YOB: 1965  Identification Number:   MRN - [id number]  Date of Service:   02/23/2024 08:58:39    Diagnosis        G93.49 - Encephalopathy Multifactorial    Impression  58 y.o. male with chronic diastolic heart failure, paroxysmal atrial fibrillation on chronic kidney disease, COPD, obstructive sleep apnea, hypertension, diabetes mellitus, neurogenic bladder who was admitted to the hospital on 2/16/2024 as a transfer from [facility] emergency room because of altered mental status, hypercapnic respiratory failure and septic arthritis. MRI brain without acute findings, chronic right lacunar infarct. Routine EEG with reported epileptiform discharges as below. AMS likely multifactorial from underlying toxic/metabolic encephalopathy w/ delirium and possible seizures. Recommend:    Start Keppra 500 mg BID  Would hold on starting ASA given chronic lacunar infarct on MRI Brain with fluctuating anemia at this time as already on Eliquis  Risk factor control of HTN, DM, HL, healthy diet and exercise  Seizure precautions with no driving as per state law  Delirium precautions  Outpatient f/up with neurology in 2-3 weeks  PT/OT  Please call with any questions    Our recommendations are outlined below    Nursing Recommendations :  Delirium precautions: Blinds open during the day, closed at night, frequent reorientation, minimize nighttime interruptionsWhen possible avoid benzodiazepines, opioid pain medications, and anticholinergic medications    Consultations :  Toxic metabolic work up per primary team    Disposition :  Neurology will follow    ----------------------------------------------------------------------------------------------------    Imaging  CT head w/o cont:  No acute intracranial process identified.    rEEG 2/22/24:  Extremely abnormal EEG because of frequent high-voltage  "epileptiform discharges which are generalized and synchronous.    MRI brain w/o cont:  1. Given motion limitations no acute ischemic change or acute intracranial abnormality noted  2. White matter changes most compatible small-vessel ischemic disease in this age group noted  3. Bilateral mastoid effusions  Small remote lacunar  infarcts noted within the basal ganglia on the right.      Labs  HbA1c 7.2    Chief Complaint:  AMS    History of Present Illness:  Patient is a 58 year old Male.  Per chart: \"58 y.o. male with chronic diastolic heart failure, paroxysmal atrial fibrillation on chronic kidney disease, COPD, obstructive sleep apnea, hypertension, diabetes mellitus, neurogenic bladder, on intermittent self-catheterization. He is currently a nursing home resident. He was admitted to the hospital on 2/16/2024 as a transfer from [facility] emergency room because of altered mental status, hypercapnic respiratory failure and septic arthritis. He is on broad-spectrum antibiotics, along with local wound care and superficial debridement of the wound. He is on oral diuretics at home. Currently getting IV diuretics. Home losartan is on hold due to worsening renal functions. Overall, patient's clinical status since admission improved. Blood cultures have been negative.\" Neurology is consulted for AMS.    Per daughter, initially during hospitalization was mildly confused. On Monday, noted became much more disoriented x few days. Yesterday morning, was talking and was responsive, prior to that was not talking, \"lights off but no one is home.\" He gradually improved but was frustrated and tired often. By yesterday afternoon and evening, he continues to do well and more awake, more likely his normal self. In conversations with him, he loses his train of thought often. He gets anxious at times when having difficulty understanding things. He uses a wheelchair at home, does not ambulate much due to neuropathy, rods and pins " in left leg as well.      Past Medical History:       Atrial Fibrillation    Medications:    Anticoagulant use:  Yes Eliquis  No Antiplatelet use  Reviewed EMR for current medications    Allergies:   Reviewed    Social History:  Smoking: Former  Alcohol Use: No  Drug Use: No    Family History:    There is no family history of premature cerebrovascular disease pertinent to this consultation    ROS :  14 Points Review of Systems was performed and was negative except mentioned in HPI.    Past Surgical History:  There Is No Surgical History Contributory To Today’s Visit    Examination  BP(137/67), Pulse(66), Temp(97.2), Resp(16),  1A: Level of Consciousness - Alert; keenly responsive + 0  1B: Ask Month and Age - Could Not Answer Either Question Correctly + 2  1C: Blink Eyes & Squeeze Hands - Performs Both Tasks + 0  2: Test Horizontal Extraocular Movements - Normal + 0  3: Test Visual Fields - No Visual Loss + 0  4: Test Facial Palsy (Use Grimace if Obtunded) - Normal symmetry + 0  5A: Test Left Arm Motor Drift - No Drift for 10 Seconds + 0  5B: Test Right Arm Motor Drift - Drift, but doesn't hit bed + 1  6A: Test Left Leg Motor Drift - No Drift for 5 Seconds + 0  6B: Test Right Leg Motor Drift - No Drift for 5 Seconds + 0  7: Test Limb Ataxia (FNF/Heel-Shin) - No Ataxia + 0  8: Test Sensation - Normal; No sensory loss + 0  9: Test Language/Aphasia - Normal; No aphasia + 0  10: Test Dysarthria - Normal + 0  11: Test Extinction/Inattention - No abnormality + 0    NIHSS Score: 3  NIHSS Free Text : AAOx does not know date, [facility], name  Chronic right arm weakness due to collarbone fracture          Patient/Family was informed the Neurology Consult would happen via TeleHealth consult by way of interactive audio and video telecommunications and consented to receiving care in this manner.    Telehealth Neurology consultation was provided. I spent minutes providing telehealth care. This includes time spent  for face to face visit via telemedicine, review of medical records, imaging studies and discussion of findings with providers, the patient and/or family.      Dr Soledad Johnston      [facility]  For Inpatient follow-up with [facility] physician please call [facility] [phone]. This is not an outpatient service. Post hospital discharge, please contact hospital directly.    Please do not communicate with [facility] physicians via secure chat. If you have any questions, Please contact [facility].  Please call or reconsult our service if there are any clinical or diagnostic changes.

## 2024-02-23 NOTE — PLAN OF CARE
Goal Outcome Evaluation:  Plan of Care Reviewed With: patient, daughter        Progress: improving  Outcome Evaluation: Pt off the floor for MRI this am. He attempted to wear the bipap multiple times last night but was unable to tolerate it for more than 30 minutes at a time. Bisi Verduzco RN

## 2024-02-24 LAB
ANION GAP SERPL CALCULATED.3IONS-SCNC: 12.2 MMOL/L (ref 5–15)
BUN SERPL-MCNC: 23 MG/DL (ref 6–20)
BUN/CREAT SERPL: 13.6 (ref 7–25)
CALCIUM SPEC-SCNC: 8.4 MG/DL (ref 8.6–10.5)
CHLORIDE SERPL-SCNC: 95 MMOL/L (ref 98–107)
CO2 SERPL-SCNC: 23.8 MMOL/L (ref 22–29)
CREAT SERPL-MCNC: 1.69 MG/DL (ref 0.76–1.27)
DEPRECATED RDW RBC AUTO: 49 FL (ref 37–54)
EGFRCR SERPLBLD CKD-EPI 2021: 46.5 ML/MIN/1.73
ERYTHROCYTE [DISTWIDTH] IN BLOOD BY AUTOMATED COUNT: 15.7 % (ref 12.3–15.4)
GLUCOSE BLDC GLUCOMTR-MCNC: 146 MG/DL (ref 70–99)
GLUCOSE BLDC GLUCOMTR-MCNC: 208 MG/DL (ref 70–99)
GLUCOSE BLDC GLUCOMTR-MCNC: 210 MG/DL (ref 70–99)
GLUCOSE BLDC GLUCOMTR-MCNC: 211 MG/DL (ref 70–99)
GLUCOSE SERPL-MCNC: 170 MG/DL (ref 65–99)
HCT VFR BLD AUTO: 27.6 % (ref 37.5–51)
HGB BLD-MCNC: 8.4 G/DL (ref 13–17.7)
MAGNESIUM SERPL-MCNC: 1.8 MG/DL (ref 1.6–2.6)
MCH RBC QN AUTO: 26.3 PG (ref 26.6–33)
MCHC RBC AUTO-ENTMCNC: 30.4 G/DL (ref 31.5–35.7)
MCV RBC AUTO: 86.5 FL (ref 79–97)
PLATELET # BLD AUTO: 411 10*3/MM3 (ref 140–450)
PMV BLD AUTO: 9.1 FL (ref 6–12)
POTASSIUM SERPL-SCNC: 3.8 MMOL/L (ref 3.5–5.2)
RBC # BLD AUTO: 3.19 10*6/MM3 (ref 4.14–5.8)
SODIUM SERPL-SCNC: 131 MMOL/L (ref 136–145)
WBC NRBC COR # BLD AUTO: 9.94 10*3/MM3 (ref 3.4–10.8)

## 2024-02-24 PROCEDURE — 82948 REAGENT STRIP/BLOOD GLUCOSE: CPT

## 2024-02-24 PROCEDURE — 63710000001 INSULIN LISPRO (HUMAN) PER 5 UNITS: Performed by: INTERNAL MEDICINE

## 2024-02-24 PROCEDURE — 99232 SBSQ HOSP IP/OBS MODERATE 35: CPT | Performed by: INTERNAL MEDICINE

## 2024-02-24 PROCEDURE — 99231 SBSQ HOSP IP/OBS SF/LOW 25: CPT | Performed by: PSYCHIATRY & NEUROLOGY

## 2024-02-24 PROCEDURE — 85027 COMPLETE CBC AUTOMATED: CPT | Performed by: INTERNAL MEDICINE

## 2024-02-24 PROCEDURE — 94799 UNLISTED PULMONARY SVC/PX: CPT

## 2024-02-24 PROCEDURE — 94664 DEMO&/EVAL PT USE INHALER: CPT

## 2024-02-24 PROCEDURE — 83735 ASSAY OF MAGNESIUM: CPT | Performed by: INTERNAL MEDICINE

## 2024-02-24 PROCEDURE — 94760 N-INVAS EAR/PLS OXIMETRY 1: CPT

## 2024-02-24 PROCEDURE — 80048 BASIC METABOLIC PNL TOTAL CA: CPT | Performed by: INTERNAL MEDICINE

## 2024-02-24 PROCEDURE — 94761 N-INVAS EAR/PLS OXIMETRY MLT: CPT

## 2024-02-24 RX ORDER — ASPIRIN 81 MG/1
81 TABLET ORAL DAILY
Status: DISCONTINUED | OUTPATIENT
Start: 2024-02-25 | End: 2024-02-25 | Stop reason: HOSPADM

## 2024-02-24 RX ORDER — FERROUS SULFATE 325(65) MG
325 TABLET ORAL
Status: DISCONTINUED | OUTPATIENT
Start: 2024-02-25 | End: 2024-02-25 | Stop reason: HOSPADM

## 2024-02-24 RX ORDER — ATORVASTATIN CALCIUM 20 MG/1
20 TABLET, FILM COATED ORAL NIGHTLY
Status: DISCONTINUED | OUTPATIENT
Start: 2024-02-24 | End: 2024-02-25 | Stop reason: HOSPADM

## 2024-02-24 RX ADMIN — TRAZODONE HYDROCHLORIDE 50 MG: 50 TABLET ORAL at 20:26

## 2024-02-24 RX ADMIN — DULOXETINE HYDROCHLORIDE 30 MG: 30 CAPSULE, DELAYED RELEASE ORAL at 09:05

## 2024-02-24 RX ADMIN — ATORVASTATIN CALCIUM 20 MG: 20 TABLET, FILM COATED ORAL at 20:26

## 2024-02-24 RX ADMIN — INSULIN LISPRO 8 UNITS: 100 INJECTION, SOLUTION INTRAVENOUS; SUBCUTANEOUS at 17:15

## 2024-02-24 RX ADMIN — CARVEDILOL 25 MG: 25 TABLET, FILM COATED ORAL at 20:26

## 2024-02-24 RX ADMIN — BUDESONIDE 0.5 MG: 0.5 INHALANT ORAL at 20:10

## 2024-02-24 RX ADMIN — SODIUM HYPOCHLORITE: 1.25 SOLUTION TOPICAL at 09:06

## 2024-02-24 RX ADMIN — Medication 10 ML: at 20:26

## 2024-02-24 RX ADMIN — APIXABAN 5 MG: 5 TABLET, FILM COATED ORAL at 09:05

## 2024-02-24 RX ADMIN — INSULIN LISPRO 8 UNITS: 100 INJECTION, SOLUTION INTRAVENOUS; SUBCUTANEOUS at 12:31

## 2024-02-24 RX ADMIN — PANTOPRAZOLE SODIUM 40 MG: 40 TABLET, DELAYED RELEASE ORAL at 06:01

## 2024-02-24 RX ADMIN — ARFORMOTEROL TARTRATE 15 MCG: 15 SOLUTION RESPIRATORY (INHALATION) at 07:33

## 2024-02-24 RX ADMIN — ARFORMOTEROL TARTRATE 15 MCG: 15 SOLUTION RESPIRATORY (INHALATION) at 20:10

## 2024-02-24 RX ADMIN — BUDESONIDE 0.5 MG: 0.5 INHALANT ORAL at 07:33

## 2024-02-24 RX ADMIN — CETIRIZINE HYDROCHLORIDE 10 MG: 10 TABLET, FILM COATED ORAL at 09:05

## 2024-02-24 RX ADMIN — LEVETIRACETAM 500 MG: 500 TABLET, FILM COATED ORAL at 20:25

## 2024-02-24 RX ADMIN — APIXABAN 5 MG: 5 TABLET, FILM COATED ORAL at 20:26

## 2024-02-24 RX ADMIN — FOLIC ACID 1 MG: 1 TABLET ORAL at 09:05

## 2024-02-24 RX ADMIN — NIFEDIPINE 30 MG: 30 TABLET, FILM COATED, EXTENDED RELEASE ORAL at 06:01

## 2024-02-24 RX ADMIN — SODIUM HYPOCHLORITE: 1.25 SOLUTION TOPICAL at 20:36

## 2024-02-24 RX ADMIN — MONTELUKAST 10 MG: 10 TABLET, FILM COATED ORAL at 20:26

## 2024-02-24 RX ADMIN — LEVETIRACETAM 500 MG: 500 TABLET, FILM COATED ORAL at 09:05

## 2024-02-24 RX ADMIN — INSULIN LISPRO 8 UNITS: 100 INJECTION, SOLUTION INTRAVENOUS; SUBCUTANEOUS at 20:53

## 2024-02-24 NOTE — PLAN OF CARE
Problem: Adult Inpatient Plan of Care  Goal: Plan of Care Review  Outcome: Ongoing, Progressing  Flowsheets (Taken 2/24/2024 1721)  Outcome Evaluation: Resting in bed. Patient attempted to get up in chair and was unable to take steps. Practiced sitting to standing. Possible discharge tomorrow  Goal: Patient-Specific Goal (Individualized)  Outcome: Ongoing, Progressing  Goal: Absence of Hospital-Acquired Illness or Injury  Outcome: Ongoing, Progressing  Intervention: Identify and Manage Fall Risk  Recent Flowsheet Documentation  Taken 2/24/2024 0752 by Alix Zheng RN  Safety Promotion/Fall Prevention: safety round/check completed  Intervention: Prevent and Manage VTE (Venous Thromboembolism) Risk  Recent Flowsheet Documentation  Taken 2/24/2024 0752 by Alix Zheng RN  Activity Management: bedrest  Goal: Optimal Comfort and Wellbeing  Outcome: Ongoing, Progressing  Intervention: Provide Person-Centered Care  Recent Flowsheet Documentation  Taken 2/24/2024 0752 by Alix Zheng RN  Trust Relationship/Rapport:   care explained   choices provided  Goal: Readiness for Transition of Care  Outcome: Ongoing, Progressing     Problem: Diabetes Comorbidity  Goal: Blood Glucose Level Within Targeted Range  Outcome: Ongoing, Progressing     Problem: Hypertension Comorbidity  Goal: Blood Pressure in Desired Range  Outcome: Ongoing, Progressing     Problem: Skin Injury Risk Increased  Goal: Skin Health and Integrity  Outcome: Ongoing, Progressing     Problem: Noninvasive Ventilation Acute  Goal: Effective Unassisted Ventilation and Oxygenation  Outcome: Ongoing, Progressing     Problem: Fall Injury Risk  Goal: Absence of Fall and Fall-Related Injury  Outcome: Ongoing, Progressing  Intervention: Promote Injury-Free Environment  Recent Flowsheet Documentation  Taken 2/24/2024 0752 by Alix Zheng RN  Safety Promotion/Fall Prevention: safety round/check completed   Goal Outcome Evaluation:              Outcome  Evaluation: Resting in bed. Patient attempted to get up in chair and was unable to take steps. Practiced sitting to standing. Possible discharge tomorrow

## 2024-02-24 NOTE — PROGRESS NOTES
[facility]   Hospitalist Progress Note    Date of admission: 2/16/2024  Patient Name: Preston Wallis  1965  Date: 2/24/2024      Subjective     No chief complaint on file.    Interval Followup:    Patient appears almost back to normal.  He is alert and oriented however does not remember the events of the prior days.  Patient denies any further chest pain.  Echocardiogram shows diastolic dysfunction.  No further workup planned per cardiology.  Patient had an MRI of his brain which showed a small remote lacunar infarct within the basal ganglia on the right but no acute changes.  Patient was seen by neurology patient did undergo an EEG that did note a left to form discharges consistent with possible seizures.  Neurology is recommending starting patient on Keppra 500 twice daily.  Neurology also recommending to hold aspirin given his chronic lacunar infarct on MR I and the fact that he is already on Eliquis.    Patient still unable to tolerate bipap machine due to anxiety       ROS:   All systems reviewed and negative at this time    Vitals:   Temp:  [97.5 °F (36.4 °C)-98.2 °F (36.8 °C)] 97.9 °F (36.6 °C)  Heart Rate:  [66-78] 77  Resp:  [16-22] 18  BP: (119-139)/(48-66) 126/48  Flow (L/min):  [2] 2    Physical Exam  Chronic ill-appearing morbidly obese in bed. Resting in bed. Family at the bedside currently   HEENT: NC/AT  Resp: decreased and clear   CV: RRRAbdomen soft obese, has some petechiae from where it appears he has been scratching on his chest/abdomen, no obvious superficial skin rash  Left lower extremity wound dressed no discharge or drainage, dry skin on left leg  Neuro: no focal deficits noted     Result Review:  Vital signs, labs and recent relevant imaging reviewed.        acetaminophen    albuterol    aluminum-magnesium hydroxide-simethicone    benzonatate    senna-docusate sodium **AND** polyethylene glycol **AND** bisacodyl **AND** bisacodyl    dextrose    dextrose    Diclofenac Sodium     glucagon (human recombinant)    guaiFENesin-dextromethorphan    hydrALAZINE    ipratropium-albuterol    Lidocaine    melatonin    nicotine    ondansetron    prochlorperazine    sodium chloride    sodium chloride    sodium chloride    apixaban, 5 mg, Oral, Q12H  arformoterol, 15 mcg, Nebulization, BID - RT  budesonide, 0.5 mg, Nebulization, BID - RT  bumetanide, 2 mg, Intravenous, Daily  carvedilol, 25 mg, Oral, Q12H  cetirizine, 10 mg, Oral, Daily  collagenase, 1 Application, Topical, Q24H  DULoxetine, 30 mg, Oral, Daily  folic acid, 1 mg, Oral, Daily  insulin lispro, 4-24 Units, Subcutaneous, 4x Daily AC & at Bedtime  levETIRAcetam, 500 mg, Oral, BID  montelukast, 10 mg, Oral, Nightly  NIFEdipine XL, 30 mg, Oral, QAM  pantoprazole, 40 mg, Oral, Q AM  psyllium, 1 packet, Oral, Daily  senna-docusate sodium, 2 tablet, Oral, BID  sodium chloride, 10 mL, Intravenous, Q12H  Sodium Hypochlorite, , Topical, Q12H  traZODone, 50 mg, Oral, Nightly        MRI Brain Without Contrast    Result Date: 2/23/2024    1. Given motion limitations no acute ischemic change or acute intracranial abnormality noted 2. White matter changes most compatible small-vessel ischemic disease in this age group noted 3. Bilateral mastoid effusions      ALLAN ALBA MD       Electronically Signed and Approved By: ALLAN ALBA MD on 2/23/2024 at 8:06             CT Head Without Contrast    Result Date: 2/21/2024   No acute intracranial process identified.     LU RIVERA MD       Electronically Signed and Approved By: LU RIVERA MD on 2/21/2024 at 16:25             XR Chest 1 View    Result Date: 2/21/2024   No significant interval change allowing for rotation on the current study.       MIAH GREEN MD       Electronically Signed and Approved By: MIAH GREEN MD on 2/21/2024 at 2:07             XR Chest 1 View    Result Date: 2/20/2024   Stable appearance of the chest allowing for difficulties with positioning.       MIAH  PETER MITCHELL       Electronically Signed and Approved By: MIAH GREEN MD on 2/20/2024 at 5:55             CT Head Without Contrast    Result Date: 2/17/2024   Motion degraded exam with no definite acute abnormalities.     MIAH GREEN MD       Electronically Signed and Approved By: MIAH GREEN MD on 2/17/2024 at 1:21             XR Chest 1 View    Result Date: 2/17/2024   No significant interval change.       MIAH GREEN MD       Electronically Signed and Approved By: MIAH GREEN MD on 2/17/2024 at 1:01             XR Chest 1 View    Result Date: 2/16/2024    1. There is borderline heart size with pulmonary vascular congestion 2. Chronic bilateral airspace disease       WHIT HARDIN MD       Electronically Signed and Approved By: WHIT HARDIN MD on 2/16/2024 at 16:52              2/2/24 echo from OSH  Normal LV wall motion with estimated LV ejection fraction 50 to 55%   Mild concentric LVH   Normal valvular structure and function       Assessment / Plan     Summary: 58-year-old male morbidly obese COPD on 2 L baseline, CKD 3/4, MILADY not adherent to BiPAP, chronic nursing home resident who presented to the outside hospital on 2/11 with pneumonia.  Patient was recently admitted to [facility] with tibial plateau fracture.  Transferred here for further evaluation concerned that he may need podiatry intervention for foot infection.  Podiatry evaluated no acute intervention required apart from local wound care and some superficial debridement.  Patient with acute hypoxemic hypercapnic respiratory failure requiring continuous BiPAP initially has issues with nonadherence to this as outpatient.  Pulmonology and podiatry assisting.  Treated with IV antibiotics for foot infection and pneumonia.  Rehab versus home health for discharge.  Hopefully in the next 2 days.    Assessment/Plan (clinically significant if listed here)  Sepsis secondary to Pneumonia, bacterial  Acute hypoxemic hypercapnic respiratory  failure  MILADY noncompliant with home bipap  Acute metabolic encephalopathy in setting of above  Diabetic foot Infection/SSTI, unspecified organism   Diastolic CHF with exacerbation 2/2024 from outside hospital EF 50%  CKD3/4 baseline creatinine ~1.4-1.7  Paroxysmal Afib on apixaban  CAD  Elevated troponin  COPD with exacerbation, baseline 2 L oxygen  Hx of DVT  IDDM2  Iron deficiency anemia   BPH  Hx of neurogenic bladder with chronic lopez  Chronic NH resident  Morbid Obesity  Polypharmacy   Old lacunar infarct   Possible seizure disorder     PLAN  Patient's white blood cell count is back to normal.  Completed a course of antibiotics   Cultures here are unremarkable for specific organism  Continue brov/pulm, scheduled [facility] respiratory hygiene  ontinue to encourage BiPAP use.  ABG done this morning and appears fine  Appreciate podiatry assistance, no additional surgical invention need further exam  Pulmonary following  CT of head was fine.  MRI of the brain did show old lacunar infarct however no new/acute infarct  Neurology following.  Patient has been started on Keppra given abnormal EEG  Cardiology following for chest pain.  Patient denies any further chest pain.  Echocardiogram shows diastolic dysfunction  Can change to oral Bumex in am   Continue Coreg, nifedipine  Continue insulin monitor  Wound care to LE   Off flomax/bph meds as pt with chronic catheter   PT/OT  Check a.m. CBC, BMP, magnesium, phosphorus  Continue hospitalization at current level of care, telemetry,   Dispo: rehab placement in [place] at [facility]       DVT prophylaxis:  Medical DVT prophylaxis orders are present.        Code Status (Patient has no pulse and is not breathing): CPR (Attempt to Resuscitate)  Medical Interventions (Patient has pulse or is breathing): Full Support        CBC          2/22/2024    04:32 2/23/2024    04:18 2/24/2024    04:03   CBC   WBC 12.33  8.75  9.94    RBC 3.05  2.90  3.19    Hemoglobin 8.2  7.8  8.4    Hematocrit  26.9  26.0  27.6    MCV 88.2  89.7  86.5    MCH 26.9  26.9  26.3    MCHC 30.5  30.0  30.4    RDW 15.8  16.2  15.7    Platelets 463  339  411        CMP          2/22/2024    04:32 2/23/2024    04:18 2/24/2024    04:03   CMP   Glucose 119  99  170    BUN 25  24  23    Creatinine 1.54  1.59  1.69    EGFR 52.0  50.0  46.5    Sodium 140  130  131    Potassium 4.4  3.8  3.8    Chloride 103  95  95    Calcium 8.9  8.4  8.4    BUN/Creatinine Ratio 16.2  15.1  13.6    Anion Gap 14.3  12.5  12.2

## 2024-02-24 NOTE — PROGRESS NOTES
[facility]  [facility] Tele[facility]urology Consult Services    Routine Consult Follow-Up    Patient Name:   Preston Wallis  YOB: 1965  Identification Number:   MRN - [id number]  Date of Service:   02/24/2024 08:16:42    Diagnosis        G93.49 - Encephalopathy Multifactorial        I63.9 - Cerebrovascular accident (CVA), unspecified mechanism (HCC)    Impression  58 y.o. male with chronic diastolic heart failure, paroxysmal atrial fibrillation on chronic kidney disease, COPD, obstructive sleep apnea, hypertension, diabetes mellitus, neurogenic bladder who was admitted to the hospital on 2/16/2024 as a transfer from [facility] emergency room because of altered mental status, hypercapnic respiratory failure and septic arthritis. MRI brain without acute findings, chronic right lacunar infarct. Routine EEG with reported epileptiform discharges as below. AMS likely multifactorial from underlying toxic/metabolic encephalopathy w/ delirium and possible seizures. Recommend:    Discussed starting ASA 81 mg daily for secondary stroke prevention given lacunar infarct that is small vessel in etiology. Discussed increased bleeding risk in combination with Eliquis. Family would like to think about this  Continue Keppra 500 mg BID  Risk factor control of HTN, DM, HL, healthy diet and exercise  Seizure precautions with no driving as per state law  Delirium precautions  Outpatient f/up with neurology in 2-3 weeks  PT/OT  Please call with any questions    Our recommendations are outlined below    Nursing Recommendations :  Delirium precautions: Blinds open during the day, closed at night, frequent reorientation, minimize nighttime interruptionsWhen possible avoid benzodiazepines, opioid pain medications, and anticholinergic medications    Consultations :  Toxic metabolic work up per primary team    Disposition :  Outpatient Neurology follow up in 3-6 weeks    Subjective  No acute events overnight. Hb 8.4  "improved today    Hospital Course  Per chart: \"58 y.o. male with chronic diastolic heart failure, paroxysmal atrial fibrillation on chronic kidney disease, COPD, obstructive sleep apnea, hypertension, diabetes mellitus, neurogenic bladder, on intermittent self-catheterization. He is currently a nursing home resident. He was admitted to the hospital on 2/16/2024 as a transfer from [facility] emergency room because of altered mental status, hypercapnic respiratory failure and septic arthritis. He is on broad-spectrum antibiotics, along with local wound care and superficial debridement of the wound. He is on oral diuretics at home. Currently getting IV diuretics. Home losartan is on hold due to worsening renal functions. Overall, patient's clinical status since admission improved. Blood cultures have been negative.\" Neurology is consulted for AMS.    Per daughter, initially during hospitalization was mildly confused. On Monday, noted became much more disoriented x few days. Yesterday morning, was talking and was responsive, prior to that was not talking, \"lights off but no one is home.\" He gradually improved but was frustrated and tired often. By yesterday afternoon and evening, he continues to do well and more awake, more likely his normal self. In conversations with him, he loses his train of thought often. He gets anxious at times when having difficulty understanding things. He uses a wheelchair at home, does not ambulate much due to neuropathy, rods and pins in left leg as well.    Imaging  CT head w/o cont:  No acute intracranial process identified.    rEEG 2/22/24:  Extremely abnormal EEG because of frequent high-voltage epileptiform discharges which are generalized and synchronous.    MRI brain w/o cont:  1. Given motion limitations no acute ischemic change or acute intracranial abnormality noted  2. White matter changes most compatible small-vessel ischemic disease in this age group noted  3. Bilateral " mastoid effusions  Small remote lacunar  infarcts noted within the basal ganglia on the right.      Labs  HbA1c 7.2      Examination  BP(129/56), Pulse(75), Temp(97.9), Resp(20),  1A: Level of Consciousness - Alert; keenly responsive + 0  1B: Ask Month and Age - Could Not Answer Either Question Correctly + 2  1C: Blink Eyes & Squeeze Hands - Performs Both Tasks + 0  2: Test Horizontal Extraocular Movements - Normal + 0  3: Test Visual Fields - No Visual Loss + 0  4: Test Facial Palsy (Use Grimace if Obtunded) - Normal symmetry + 0  5A: Test Left Arm Motor Drift - No Drift for 10 Seconds + 0  5B: Test Right Arm Motor Drift - No Drift for 10 Seconds + 0  6A: Test Left Leg Motor Drift - No Drift for 5 Seconds + 0  6B: Test Right Leg Motor Drift - No Drift for 5 Seconds + 0  7: Test Limb Ataxia (FNF/Heel-Shin) - No Ataxia + 0  8: Test Sensation - Normal; No sensory loss + 0  9: Test Language/Aphasia - Normal; No aphasia + 0  10: Test Dysarthria - Normal + 0  11: Test Extinction/Inattention - No abnormality + 0    NIHSS Score: 2  NIHSS Free Text : AAOx February 2014 then corrected 2024, Saturday, [facility], name  Chronic right arm weakness due to collarbone fracture  Can lift LE off bed and antigravity but cannot lift very high  Inc sensation right leg          Patient/Family was informed the Neurology Consult would happen via TeleHealth consult by way of interactive audio and video telecommunications and consented to receiving care in this manner.    Telehealth Neurology consultation was provided. I spent minutes providing telehealth care. This includes time spent for face to face visit via telemedicine, review of medical records, imaging studies and discussion of findings with providers, the patient and/or family.      Dr Soledad Johnston      [facility]  For Inpatient follow-up with [facility] physician please call [facility] [phone]. This is not an outpatient service. Post hospital discharge,  please contact hospital directly.    Please do not communicate with [facility] physicians via secure chat. If you have any questions, Please contact [facility].  Please call or reconsult our service if there are any clinical or diagnostic changes.

## 2024-02-24 NOTE — PROGRESS NOTES
Pulmonary / Critical Care Progress Note      Patient Name: Preston Wallis  : 1965  MRN: [id number]  Primary Care Physician:  Kimmy Riley MD  Date of admission: 2024    Subjective   Subjective   Follow-up for hypercapnic and hypoxemic respiratory failure requiring NIPPV    No acute events overnight.  Wore NIPPV.    This afternoon,  Lying in bed  Remains on 2 L nasal cannula baseline  Overall feeling better  Alert and answering questions appropriately  Tolerated Home NIPPV for a few hours overnight  Denies dyspnea  No fever or chills  No chest pain or hemoptysis      Objective   Objective     Vitals:   Temp:  [97.7 °F (36.5 °C)-98.2 °F (36.8 °C)] 97.7 °F (36.5 °C)  Heart Rate:  [68-84] 77  Resp:  [16-22] 18  BP: (125-139)/(47-66) 137/47  Flow (L/min):  [1-2] 1    Physical Exam   Vital Signs Reviewed  General WDWN, Alert, NAD.    Chest:  good aeration, clear to auscultation bilaterally, tympanic to percussion bilaterally, no work of breathing noted  CV: RRR, no MGR, .  EXT:  no clubbing, no cyanosis, no edema, no joint tenderness  Neuro:  A&Ox3, CN grossly intact, no focal deficits.  Skin: No rashes or lesions noted    Result Review    Result Review:  I have personally reviewed the results from the time of this admission to 2024 18:14 EST and agree with these findings:  [x]  Laboratory  [x]  Microbiology  [x]  Radiology  [x]  EKG/Telemetry   [x]  Cardiology/Vascular   []  Pathology  []  Old records  []  Other:  Most notable findings include:         Lab 24  0403 24  0418 24  0432 24  1659 24  2349 24  0448 24  0426 24  2236 24  0559 24  1632 24  0600   WBC 9.94 8.75 12.33* 11.95* 10.21 10.00  --   --  10.72  --  12.53*   HEMOGLOBIN 8.4* 7.8* 8.2* 8.3* 8.6* 7.9*  --   --  7.7*   < > 7.1*   HEMATOCRIT 27.6* 26.0* 26.9* 27.1* 28.6* 26.6*  --   --  25.7*   < > 24.1*   PLATELETS 411 339 463* 469* 488* 464*  --   --  487*  --  454*    SODIUM 131* 130* 140 140 137 137  --   --  135*  --  138   SODIUM, ARTERIAL  --   --   --   --   --   --  137.3   < >  --   --   --    POTASSIUM 3.8 3.8 4.4 4.4 4.1 4.3  --   --  4.6  --  4.4   CHLORIDE 95* 95* 103 105 101 101  --   --  99  --  102   CO2 23.8 22.5 22.7 23.7 25.5 25.9  --   --  26.8  --  27.9   BUN 23* 24* 25* 25* 23* 25*  --   --  27*  --  30*   CREATININE 1.69* 1.59* 1.54* 1.54* 1.46* 1.56*  --   --  1.73*  --  1.76*   GLUCOSE 170* 99 119* 101* 100* 130*  --   --  162*  --  130*   GLUCOSE, ARTERIAL  --   --   --   --   --   --  123*   < >  --   --   --    CALCIUM 8.4* 8.4* 8.9 8.7 8.8 8.7  --   --  8.8  --  8.5*   PHOSPHORUS  --  2.9 2.7  --  2.6 2.8  --   --  2.8  --  3.0   URIC ACID  --   --  5.0  --   --   --   --   --   --   --   --    TOTAL PROTEIN  --   --   --   --  7.8  --   --   --   --   --   --    ALBUMIN  --   --   --   --  2.8*  --   --   --   --   --   --    GLOBULIN  --   --   --   --  5.0  --   --   --   --   --   --     < > = values in this interval not displayed.     Assessment & Plan   Assessment / Plan     Active Hospital Problems:  Active Hospital Problems    Diagnosis     **Septic joint     Septic arthritis     Skin ulcer of left heel, limited to breakdown of skin     Ulcer of left foot, limited to breakdown of skin     Left foot pain     Type 2 DM with CKD stage 3 and hypertension     Polyneuropathy      Impression:   Acute on chronic hypoxemic and hypercapnic respiratory failure  Acute on chronic COPD exacerbation, FEV1 of 26%  CO2 narcosis respiratory  Acidosis  Elevated BNP, 2590  ERIC on CKD  History of recurrent Pseudomonas infection with MDRO Pseudomonas  History of PE in July 2019  Tobacco abuse in remission  Altered mental status  Toxic encephalopathy     Plan:   -Continue to maintain SpO2 greater than 90%.  2 L baseline.  -Continue home NIPPV nightly with naps on current settings.  Continue to work to adjust settings for tolerability.  -Continue trazodone 50 mg p.o.  at bedtime  -RT  on board.  Appreciate assistance.  -Completed course of antibiotics.  -Continue Bumex 2 mg IV daily  -Continue to monitor renal panel and electrolytes.  Replace electrolytes as needed.  -Continue bronchopulmonary hygiene.  Encourage I-S and flutter  -Continue Brovana, Pulmicort, and DuoNebs  -Continue Eliquis for A-fib.  -Neurology on board.  Appreciate assistance.  -CT of head without acute intracranial process noted.  -MRI brain with old lunar infarct.  No new or acute infarcts noted.  -Encourage mobilization.  Out of bed to chair    Unless otherwise needed, pulmonary will sign off at this time. Please call with any questions or concern    DVT prophylaxis:  Medical DVT prophylaxis orders are present.    CODE STATUS:   Code Status (Patient has no pulse and is not breathing): CPR (Attempt to Resuscitate)  Medical Interventions (Patient has pulse or is breathing): Full Support      Labs, imaging, microbiology, notes and medications personally reviewed  Discussed with primary    Electronically signed by CON Camilo, 02/24/24, 6:14 PM EST.      This visit was performed by BOTH a physician and an APC. I personally evaluated and examined the patient.  And spent more than 51% of time caring for this patient I performed all aspects of MDM as documented. , I have reviewed and confirmed the accuracy of the patient's history as documented in this note. and I have reexamined the patient and the results are consistent with the previously documented exam. I have updated the documentation as necessary    Electronically signed by Walter Nicole DO, 02/24/24, 6:36 PM EST.

## 2024-02-25 VITALS
OXYGEN SATURATION: 96 % | RESPIRATION RATE: 18 BRPM | BODY MASS INDEX: 40.98 KG/M2 | HEART RATE: 69 BPM | SYSTOLIC BLOOD PRESSURE: 115 MMHG | DIASTOLIC BLOOD PRESSURE: 88 MMHG | WEIGHT: 276.68 LBS | HEIGHT: 69 IN | TEMPERATURE: 97.8 F

## 2024-02-25 LAB
ANION GAP SERPL CALCULATED.3IONS-SCNC: 10.7 MMOL/L (ref 5–15)
BUN SERPL-MCNC: 23 MG/DL (ref 6–20)
BUN/CREAT SERPL: 11.4 (ref 7–25)
CALCIUM SPEC-SCNC: 8.5 MG/DL (ref 8.6–10.5)
CHLORIDE SERPL-SCNC: 97 MMOL/L (ref 98–107)
CO2 SERPL-SCNC: 24.3 MMOL/L (ref 22–29)
CREAT SERPL-MCNC: 2.01 MG/DL (ref 0.76–1.27)
DEPRECATED RDW RBC AUTO: 50.6 FL (ref 37–54)
EGFRCR SERPLBLD CKD-EPI 2021: 37.7 ML/MIN/1.73
ERYTHROCYTE [DISTWIDTH] IN BLOOD BY AUTOMATED COUNT: 15.9 % (ref 12.3–15.4)
GLUCOSE BLDC GLUCOMTR-MCNC: 138 MG/DL (ref 70–99)
GLUCOSE BLDC GLUCOMTR-MCNC: 230 MG/DL (ref 70–99)
GLUCOSE SERPL-MCNC: 207 MG/DL (ref 65–99)
HCT VFR BLD AUTO: 27.2 % (ref 37.5–51)
HGB BLD-MCNC: 8.3 G/DL (ref 13–17.7)
MAGNESIUM SERPL-MCNC: 1.6 MG/DL (ref 1.6–2.6)
MCH RBC QN AUTO: 26.9 PG (ref 26.6–33)
MCHC RBC AUTO-ENTMCNC: 30.5 G/DL (ref 31.5–35.7)
MCV RBC AUTO: 88 FL (ref 79–97)
PLATELET # BLD AUTO: 353 10*3/MM3 (ref 140–450)
PMV BLD AUTO: 9 FL (ref 6–12)
POTASSIUM SERPL-SCNC: 4 MMOL/L (ref 3.5–5.2)
QT INTERVAL: 365 MS
QTC INTERVAL: 467 MS
RBC # BLD AUTO: 3.09 10*6/MM3 (ref 4.14–5.8)
SODIUM SERPL-SCNC: 132 MMOL/L (ref 136–145)
WBC NRBC COR # BLD AUTO: 10.33 10*3/MM3 (ref 3.4–10.8)

## 2024-02-25 PROCEDURE — 80048 BASIC METABOLIC PNL TOTAL CA: CPT | Performed by: INTERNAL MEDICINE

## 2024-02-25 PROCEDURE — 94799 UNLISTED PULMONARY SVC/PX: CPT

## 2024-02-25 PROCEDURE — 94761 N-INVAS EAR/PLS OXIMETRY MLT: CPT

## 2024-02-25 PROCEDURE — 63710000001 INSULIN LISPRO (HUMAN) PER 5 UNITS: Performed by: INTERNAL MEDICINE

## 2024-02-25 PROCEDURE — 82948 REAGENT STRIP/BLOOD GLUCOSE: CPT

## 2024-02-25 PROCEDURE — 83735 ASSAY OF MAGNESIUM: CPT | Performed by: INTERNAL MEDICINE

## 2024-02-25 PROCEDURE — 94664 DEMO&/EVAL PT USE INHALER: CPT

## 2024-02-25 PROCEDURE — 99239 HOSP IP/OBS DSCHRG MGMT >30: CPT | Performed by: INTERNAL MEDICINE

## 2024-02-25 PROCEDURE — 82948 REAGENT STRIP/BLOOD GLUCOSE: CPT | Performed by: INTERNAL MEDICINE

## 2024-02-25 PROCEDURE — 85027 COMPLETE CBC AUTOMATED: CPT | Performed by: INTERNAL MEDICINE

## 2024-02-25 RX ORDER — ASPIRIN 81 MG/1
81 TABLET ORAL DAILY
Start: 2024-02-26

## 2024-02-25 RX ORDER — LEVETIRACETAM 500 MG/1
500 TABLET ORAL 2 TIMES DAILY
Start: 2024-02-25

## 2024-02-25 RX ORDER — NICOTINE 21 MG/24HR
1 PATCH, TRANSDERMAL 24 HOURS TRANSDERMAL DAILY PRN
Start: 2024-02-25

## 2024-02-25 RX ORDER — INSULIN DETEMIR 100 [IU]/ML
10 INJECTION, SOLUTION SUBCUTANEOUS NIGHTLY
Start: 2024-02-25

## 2024-02-25 RX ORDER — INSULIN LISPRO 100 [IU]/ML
4-24 INJECTION, SOLUTION INTRAVENOUS; SUBCUTANEOUS
Start: 2024-02-25

## 2024-02-25 RX ORDER — DULOXETIN HYDROCHLORIDE 30 MG/1
30 CAPSULE, DELAYED RELEASE ORAL DAILY
Start: 2024-02-26

## 2024-02-25 RX ORDER — TRAZODONE HYDROCHLORIDE 50 MG/1
50 TABLET ORAL NIGHTLY
Start: 2024-02-25

## 2024-02-25 RX ADMIN — APIXABAN 5 MG: 5 TABLET, FILM COATED ORAL at 08:27

## 2024-02-25 RX ADMIN — FOLIC ACID 1 MG: 1 TABLET ORAL at 08:28

## 2024-02-25 RX ADMIN — FERROUS SULFATE TAB 325 MG (65 MG ELEMENTAL FE) 325 MG: 325 (65 FE) TAB at 08:28

## 2024-02-25 RX ADMIN — CETIRIZINE HYDROCHLORIDE 10 MG: 10 TABLET, FILM COATED ORAL at 08:27

## 2024-02-25 RX ADMIN — ARFORMOTEROL TARTRATE 15 MCG: 15 SOLUTION RESPIRATORY (INHALATION) at 06:43

## 2024-02-25 RX ADMIN — NIFEDIPINE 30 MG: 30 TABLET, FILM COATED, EXTENDED RELEASE ORAL at 06:13

## 2024-02-25 RX ADMIN — CARVEDILOL 25 MG: 25 TABLET, FILM COATED ORAL at 08:27

## 2024-02-25 RX ADMIN — INSULIN LISPRO 8 UNITS: 100 INJECTION, SOLUTION INTRAVENOUS; SUBCUTANEOUS at 08:28

## 2024-02-25 RX ADMIN — PANTOPRAZOLE SODIUM 40 MG: 40 TABLET, DELAYED RELEASE ORAL at 06:13

## 2024-02-25 RX ADMIN — BUDESONIDE 0.5 MG: 0.5 INHALANT ORAL at 06:43

## 2024-02-25 RX ADMIN — LEVETIRACETAM 500 MG: 500 TABLET, FILM COATED ORAL at 08:27

## 2024-02-25 RX ADMIN — ASPIRIN 81 MG: 81 TABLET, COATED ORAL at 08:27

## 2024-02-25 RX ADMIN — DULOXETINE HYDROCHLORIDE 30 MG: 30 CAPSULE, DELAYED RELEASE ORAL at 08:27

## 2024-02-25 NOTE — PLAN OF CARE
Goal Outcome Evaluation:   PT ABLE TO TOLERATE BIPAP FROM 1358-6611. DRESSINGS CLEAN AND INTACT. NO OTHER CONCERNS. PLANNING FOR DC TO [facility] TODAY. Lizet Herrera RN

## 2024-02-25 NOTE — PLAN OF CARE
Problem: Adult Inpatient Plan of Care  Goal: Plan of Care Review  Outcome: Adequate for Care Transition  Goal: Patient-Specific Goal (Individualized)  Outcome: Adequate for Care Transition  Goal: Absence of Hospital-Acquired Illness or Injury  Outcome: Adequate for Care Transition  Intervention: Identify and Manage Fall Risk  Recent Flowsheet Documentation  Taken 2/25/2024 0900 by Alix Zheng RN  Safety Promotion/Fall Prevention: safety round/check completed  Intervention: Prevent and Manage VTE (Venous Thromboembolism) Risk  Recent Flowsheet Documentation  Taken 2/25/2024 0900 by Alix Zheng RN  Activity Management: activity encouraged  Goal: Optimal Comfort and Wellbeing  Outcome: Adequate for Care Transition  Intervention: Provide Person-Centered Care  Recent Flowsheet Documentation  Taken 2/25/2024 0900 by Alix Zheng RN  Trust Relationship/Rapport:   care explained   choices provided  Goal: Readiness for Transition of Care  Outcome: Adequate for Care Transition     Problem: Diabetes Comorbidity  Goal: Blood Glucose Level Within Targeted Range  Outcome: Adequate for Care Transition     Problem: Hypertension Comorbidity  Goal: Blood Pressure in Desired Range  Outcome: Adequate for Care Transition     Problem: Skin Injury Risk Increased  Goal: Skin Health and Integrity  Outcome: Adequate for Care Transition     Problem: Noninvasive Ventilation Acute  Goal: Effective Unassisted Ventilation and Oxygenation  Outcome: Adequate for Care Transition     Problem: Fall Injury Risk  Goal: Absence of Fall and Fall-Related Injury  Outcome: Adequate for Care Transition  Intervention: Promote Injury-Free Environment  Recent Flowsheet Documentation  Taken 2/25/2024 0900 by Alix Zheng RN  Safety Promotion/Fall Prevention: safety round/check completed   Goal Outcome Evaluation:              Outcome Evaluation: Resting in bed. Patient attempted to get up in chair and was unable to take steps. Practiced  sitting to standing. Possible discharge tomorrow

## 2024-02-25 NOTE — DISCHARGE SUMMARY
[facility]         HOSPITALIST  DISCHARGE SUMMARY    Patient Name: Preston Wallis  : 1965  MRN: [id number]    Date of Admission: 2024  Date of Discharge:  2024    Primary Care Physician: Kimmy Riley MD    Consults       Date and Time Order Name Status Description    2024 11:24 AM Inpatient Neurology Consult Other (see comments)      2024  9:38 AM Inpatient Cardiology Consult Completed     2024  4:10 PM Inpatient Podiatry Consult Completed     2024  4:10 PM Inpatient Pulmonology Consult              Active and Resolved Hospital Problems:  Active Hospital Problems    Diagnosis POA    **Septic joint [M00.9] Yes    Septic arthritis [M00.9] Yes    Skin ulcer of left heel, limited to breakdown of skin [L97.421] Yes    Ulcer of left foot, limited to breakdown of skin [L97.521] Yes    Left foot pain [M79.672] Yes    Type 2 DM with CKD stage 3 and hypertension [E11.22, I12.9, N18.30] Yes    Polyneuropathy [G62.9] Yes      Resolved Hospital Problems   No resolved problems to display.       Hospital Course     Hospital Course:  Preston Wallis is a 58 y.o. male with morbidly obese COPD on 2 L baseline, CKD 3/4, MILADY not adherent to BiPAP,  who presented to the outside hospital on  with pneumonia. Patient was recently admitted to [place] with tibial plateau fracture. Transferred here for further evaluation concerned that he may need podiatry intervention for foot infection. Podiatry evaluated no acute intervention required apart from local wound care and some superficial debridement. Patient with acute hypoxemic hypercapnic respiratory failure requiring continuous BiPAP initially has issues with nonadherence to this as outpatient.  Patient was treated with IV antibiotics and transitioned over to oral antibiotics and completed treatment during his hospitalization.  His hospitalization was complicated with agitation/confusion worsened after being given some  medication for his agitation.  This eventually resolved.  Patient was also evaluated by neurology for his confusion.  Patient's MRI did show an old lacunar infarct however no acute infarct.  Patient also had an EEG which was abnormal and concerning for seizure-like activity and neurology has started patient on Keppra.  Patient was also seen by cardiology as he was having some chest pains with elevated troponin however his echocardiogram appears to show diastolic dysfunction and at this time cardiology recommended continued medical management.  Patient will be discharged to the rehab facility today in stable condition    Please note patient has had multiple medication changes his Cymbalta and trazodone dose has been decreased.  His Neurontin has been discontinued as he has not needed it during his hospitalization and it can contribute to his altered mental status.  Patient was also on Coreg in addition to metoprolol.  Metoprolol has been discontinued.  Patient also is on 2 diuretics he was on Bumex and Lasix at this time patient will be continued on Bumex which he was getting here in the hospital.  Continue to monitor his creatinine as it was slightly elevated at 2.  His Cozaar was also discontinued until his creatinine improves.  Patient was also started on Keppra for his seizures.  He was also started on aspirin.  Patient also had significant reduction in his insulin as his diet was monitored during his hospital stay therefore he may not need as much insulin upon discharge charged from rehab facility.      DISCHARGE Follow Up Recommendations for labs and diagnostics:   Patient will need close follow-up with his outpatient primary care provider for all of his medical issues and continued wound care.      Day of Discharge     Vital Signs:  Temp:  [97.7 °F (36.5 °C)-98.2 °F (36.8 °C)] 97.9 °F (36.6 °C)  Heart Rate:  [69-84] 69  Resp:  [16-20] 16  BP: (125-149)/(47-90) 125/90  Flow (L/min):  [1-2] 1  Physical Exam:    Chronic ill-appearing morbidly obese in bed. Resting in bed. No family at the bedside   HEENT: NC/AT  Resp: decreased and clear   CV: RRR no murmur  Abdomen: Soft NT, ND   Left lower extremity wound dressed no discharge or drainage, dry skin on left leg  Neuro: no focal deficits noted   Psych: Normal mood and affect noted       Discharge Details        Discharge Medications        New Medications        Instructions Start Date   aspirin 81 MG EC tablet   81 mg, Oral, Daily   Start Date: February 26, 2024     collagenase 250 UNIT/GM ointment   1 Application, Topical, Every 24 Hours Scheduled      Insulin Lispro 100 UNIT/ML injection  Commonly known as: humaLOG  Replaces: Insulin Lispro (1 Unit Dial) 100 UNIT/ML solution pen-injector   4-24 Units, Subcutaneous, 4 Times Daily Before Meals & Nightly      Levemir 100 UNIT/ML injection  Generic drug: insulin detemir   10 Units, Subcutaneous, Nightly      levETIRAcetam 500 MG tablet  Commonly known as: KEPPRA   500 mg, Oral, 2 Times Daily      nicotine 21 MG/24HR patch  Commonly known as: NICODERM CQ   1 patch, Transdermal, Daily PRN      Sodium Hypochlorite 0.125 % topical solution  Commonly known as: DAKIN'S   Topical, Every 12 Hours Scheduled             Changes to Medications        Instructions Start Date   Breztri Aerosphere 160-9-4.8 MCG/ACT aerosol inhaler  Generic drug: Budeson-Glycopyrrol-Formoterol  What changed: See the new instructions.   INHALE 2 PUFFS TWICE DAILY. RINSE MOUTH OUT AFTER EACH USE      DULoxetine 30 MG capsule  Commonly known as: CYMBALTA  What changed:   medication strength  how much to take  when to take this   30 mg, Oral, Daily   Start Date: February 26, 2024     traZODone 50 MG tablet  Commonly known as: DESYREL  What changed:   medication strength  how much to take   50 mg, Oral, Nightly      Ventolin  (90 Base) MCG/ACT inhaler  Generic drug: albuterol sulfate HFA  What changed: See the new instructions.   INHALE 2 PUFFS FOUR  TIMES DAILY AS NEEDED FOR WHEEZING             Continue These Medications        Instructions Start Date   apixaban 5 MG tablet tablet  Commonly known as: Eliquis   5 mg, Oral, Every 12 Hours      atorvastatin 20 MG tablet  Commonly known as: LIPITOR   20 mg, Oral, Nightly      bumetanide 2 MG tablet  Commonly known as: BUMEX   Take 1 tablet by mouth 2 (Two) Times a Day.      Bydureon BCise 2 MG/0.85ML auto-injector injection  Generic drug: exenatide er   2 mg, Subcutaneous, Weekly      calcium citrate 950 (200 Ca) MG tablet  Commonly known as: CALCITRATE   950 mg, Oral, Daily      carvedilol 25 MG tablet  Commonly known as: COREG   25 mg, Oral, Every 12 Hours Scheduled      docusate sodium 100 MG capsule  Commonly known as: COLACE   100 mg, Oral, 2 Times Daily PRN      famotidine 40 MG tablet  Commonly known as: PEPCID   40 mg, Oral, Every Morning      Farxiga 5 MG tablet tablet  Generic drug: dapagliflozin   5 mg, Oral, Daily      ferrous gluconate 324 MG tablet  Commonly known as: FERGON   324 mg, Oral, Daily With Breakfast      finasteride 5 MG tablet  Commonly known as: PROSCAR   5 mg, Oral, Daily      Fluticasone-Salmeterol 500-50 MCG/ACT DISKUS  Commonly known as: ADVAIR/WIXELA   1 puff, Inhalation, 2 Times Daily - RT      folic acid 1 MG tablet  Commonly known as: FOLVITE   1 mg, Oral, Daily      magnesium oxide 400 tablet tablet  Commonly known as: MAG-OX   400 mg, Oral, Daily, Started by Flaget       montelukast 10 MG tablet  Commonly known as: SINGULAIR   10 mg, Oral, Every Night at Bedtime      multivitamin with iron tablet tablet   1 tablet, Oral, Daily      NIFEdipine XL 30 MG 24 hr tablet  Commonly known as: PROCARDIA XL   30 mg, Oral, Every Morning      O2  Commonly known as: OXYGEN   2 Liter O2 - CONTINUOUS (route: Oxygen)      ondansetron 4 MG tablet  Commonly known as: ZOFRAN   4 mg, Oral, Every 8 Hours PRN      silver sulfadiazine 1 % cream  Commonly known as: SILVADENE, SSD   Apply 1 Application  topically to the appropriate area as directed 2 (Two) Times a Day. For his shin      vitamin C 500 MG tablet  Commonly known as: ASCORBIC ACID   500 mg, Oral, 2 Times Daily      Vitamin D3 50 MCG (2000 UT) tablet   50 mcg, Oral, Daily      Zinc 50 MG tablet   1 tablet, Oral, Daily             Stop These Medications      furosemide 40 MG tablet  Commonly known as: LASIX     gabapentin 300 MG capsule  Commonly known as: NEURONTIN     Insulin Lispro (1 Unit Dial) 100 UNIT/ML solution pen-injector  Commonly known as: HUMALOG  Replaced by: Insulin Lispro 100 UNIT/ML injection     Lantus SoloStar 100 UNIT/ML injection pen  Generic drug: Insulin Glargine     losartan 25 MG tablet  Commonly known as: COZAAR     metoprolol tartrate 100 MG tablet  Commonly known as: LOPRESSOR              Allergies   Allergen Reactions    Benadryl [Diphenhydramine] Itching    Proventil [Albuterol] Other (See Comments)     Mouth sores         Discharge Disposition:  Rehab Facility or Unit (DC - External)    Diet:  Hospital:  Diet Order   Procedures    Diet: Cardiac Diets; Healthy Heart (2-3 Na+); Texture: Regular Texture (IDDSI 7); Fluid Consistency: Thin (IDDSI 0)       Discharge Activity: as tolerated       CODE STATUS:  Code Status and Medical Interventions:   Ordered at: 02/16/24 1618     Code Status (Patient has no pulse and is not breathing):    CPR (Attempt to Resuscitate)     Medical Interventions (Patient has pulse or is breathing):    Full Support         Future Appointments   Date Time Provider Department Center   3/8/2024 10:40 AM Neli Paredes APRN [facility] DI[place] BT [facility]   3/21/2024 10:00 AM Rashad Chi MD [facility]   4/4/2024 11:15 AM Betzaida Nelson APRN [facility] PCC BAR [facility]   5/20/2024 10:30 AM Teresita White MD [facility] U ETWOOD [facility]   7/31/2024 10:45 AM Lino Ware MD [facility] CD BARDS [facility]       Additional Instructions for the Follow-ups that You Need to Schedule       Discharge Follow-up with PCP   As directed        Currently Documented PCP:    Kimmy Riley MD    PCP Phone Number:    [phone]     Follow Up Details: follow up when discharge from rehab                Pertinent  and/or Most Recent Results     PROCEDURES:   None     LAB RESULTS:      Lab 02/25/24  0507 02/24/24  0403 02/23/24  0418 02/22/24  0432 02/21/24  1659 02/20/24  2349 02/20/24  0448 02/20/24  0426 02/19/24  2236 02/19/24  0559   WBC 10.33 9.94 8.75 12.33* 11.95* 10.21 10.00  --   --  10.72   HEMOGLOBIN 8.3* 8.4* 7.8* 8.2* 8.3* 8.6* 7.9*  --   --  7.7*   HEMATOCRIT 27.2* 27.6* 26.0* 26.9* 27.1* 28.6* 26.6*  --   --  25.7*   PLATELETS 353 411 339 463* 469* 488* 464*  --   --  487*   NEUTROS ABS  --   --  4.85 8.54*  --  6.10 5.95  --   --  6.49   IMMATURE GRANS (ABS)  --   --  0.05 0.07*  --  0.07* 0.07*  --   --  0.09*   LYMPHS ABS  --   --  2.27 1.79  --  2.24 2.03  --   --  2.16   MONOS ABS  --   --  0.91* 1.27*  --  0.97* 1.12*  --   --  1.24*   EOS ABS  --   --  0.57* 0.52*  --  0.71* 0.68*  --   --  0.60*   MCV 88.0 86.5 89.7 88.2 88.0 87.7 88.1  --   --  88.6   PROCALCITONIN  --   --   --   --   --   --   --   --   --  0.27*   LACTATE  --   --   --   --   --  0.7  --   --   --   --    LACTATE, ARTERIAL  --   --   --   --   --   --   --  0.74 0.72  --          Lab 02/25/24  0507 02/24/24  0403 02/23/24  0418 02/22/24  0432 02/21/24  1659 02/20/24  2349 02/20/24  0448 02/20/24  0426 02/19/24 2236 02/19/24 2236 02/19/24  0559   SODIUM 132* 131* 130* 140 140 137 137  --   --   --  135*   SODIUM, ARTERIAL  --   --   --   --   --   --   --  137.3   < > 136.2  --    POTASSIUM 4.0 3.8 3.8 4.4 4.4 4.1 4.3  --   --   --  4.6   CHLORIDE 97* 95* 95* 103 105 101 101  --   --   --  99   CO2 24.3 23.8 22.5 22.7 23.7 25.5 25.9  --   --   --  26.8   ANION GAP 10.7 12.2 12.5 14.3 11.3 10.5 10.1  --   --   --  9.2   BUN 23* 23* 24* 25* 25* 23* 25*  --   --   --  27*   CREATININE 2.01* 1.69* 1.59* 1.54* 1.54* 1.46* 1.56*  --   --   --  1.73*   EGFR 37.7*  46.5* 50.0* 52.0* 52.0* 55.4* 51.2*  --   --   --  45.2*   GLUCOSE 207* 170* 99 119* 101* 100* 130*  --   --   --  162*   GLUCOSE, ARTERIAL  --   --   --   --   --   --   --  123*   < > 153*  --    CALCIUM 8.5* 8.4* 8.4* 8.9 8.7 8.8 8.7  --   --   --  8.8   IONIZED CALCIUM  --   --   --   --   --   --   --  1.15  --  1.14  --    MAGNESIUM 1.6 1.8 1.7 1.8  --  1.7 1.7  --   --   --  1.7   PHOSPHORUS  --   --  2.9 2.7  --  2.6 2.8  --   --   --  2.8   TSH  --   --   --  1.790  --   --   --   --   --   --   --     < > = values in this interval not displayed.         Lab 02/20/24  2349   TOTAL PROTEIN 7.8   ALBUMIN 2.8*   GLOBULIN 5.0   ALT (SGPT) 14   AST (SGOT) 18   BILIRUBIN 0.2   ALK PHOS 174*         Lab 02/22/24  0905 02/22/24  0432   PROBNP 947.8*  --    HSTROP T 138* 148*             Lab 02/18/24  1758   ABO TYPING O   RH TYPING Negative   ANTIBODY SCREEN Negative         Lab 02/22/24  0921 02/21/24  1109 02/20/24  0426   PH, ARTERIAL 7.403 7.440 7.368   PCO2, ARTERIAL 35.5 38.1 48.9*   PO2 ART 68.3* 68.5* 113.1*   O2 SATURATION ART 92.3* 93.5* 97.4   FIO2  --  21  --    HCO3 ART 21.6* 25.3 27.5*   BASE EXCESS ART -2.5* 1.2 1.8   CARBOXYHEMOGLOBIN 0.4 0.3 0.3     Brief Urine Lab Results  (Last result in the past 365 days)        Color   Clarity   Blood   Leuk Est   Nitrite   Protein   CREAT   Urine HCG        12/07/23 0811             26.2               Microbiology Results (last 10 days)       Procedure Component Value - Date/Time    Respiratory Culture - Sputum, Cough [[id number]] Collected: 02/18/24 0311    Lab Status: Final result Specimen: Sputum from Cough Updated: 02/20/24 0919     Respiratory Culture Moderate growth (3+) Normal respiratory alexander. No S. aureus or Pseudomonas aeruginosa detected. Final report.     Gram Stain Rare (1+) Gram negative bacilli      Many (4+) WBCs seen      Rare (1+) Epithelial cells seen    S. Pneumo Ag Urine or CSF - Urine, Urine, Clean Catch [[id number]]  (Normal) Collected:  02/17/24 1806    Lab Status: Final result Specimen: Urine, Clean Catch Updated: 02/17/24 1911     Strep Pneumo Ag Negative    Legionella Antigen, Urine - Urine, Urine, Clean Catch [[id number]]  (Normal) Collected: 02/17/24 1806    Lab Status: Final result Specimen: Urine, Clean Catch Updated: 02/17/24 1911     LEGIONELLA ANTIGEN, URINE Negative    Respiratory Panel PCR w/COVID-19(SARS-CoV-2) [facility]IS In-House, NP Swab in UTM/VTM, 2 HR TAT - Swab, Nasopharynx [[id number]]  (Normal) Collected: 02/16/24 1849    Lab Status: Final result Specimen: Swab from Nasopharynx Updated: 02/16/24 1949     ADENOVIRUS, PCR Not Detected     Coronavirus 229E Not Detected     Coronavirus HKU1 Not Detected     Coronavirus NL63 Not Detected     Coronavirus OC43 Not Detected     COVID19 Not Detected     Human Metapneumovirus Not Detected     Human Rhinovirus/Enterovirus Not Detected     Influenza A PCR Not Detected     Influenza B PCR Not Detected     Parainfluenza Virus 1 Not Detected     Parainfluenza Virus 2 Not Detected     Parainfluenza Virus 3 Not Detected     Parainfluenza Virus 4 Not Detected     RSV, PCR Not Detected     Bordetella pertussis pcr Not Detected     Bordetella parapertussis PCR Not Detected     Chlamydophila pneumoniae PCR Not Detected     Mycoplasma pneumo by PCR Not Detected    Narrative:      In the setting of a positive respiratory panel with a viral infection PLUS a negative procalcitonin without other underlying concern for bacterial infection, consider observing off antibiotics or discontinuation of antibiotics and continue supportive care. If the respiratory panel is positive for atypical bacterial infection (Bordetella pertussis, Chlamydophila pneumoniae, or Mycoplasma pneumoniae), consider antibiotic de-escalation to target atypical bacterial infection.    Blood Culture - Blood, Hand, Left [[id number]]  (Normal) Collected: 02/16/24 1730    Lab Status: Final result Specimen: Blood from Hand, Left  Updated: 02/21/24 1746     Blood Culture No growth at 5 days    Blood Culture - Blood, Arm, Left [[id number]]  (Normal) Collected: 02/16/24 1707    Lab Status: Final result Specimen: Blood from Arm, Left Updated: 02/21/24 1731     Blood Culture No growth at 5 days            MRI Brain Without Contrast    Result Date: 2/23/2024    1. Given motion limitations no acute ischemic change or acute intracranial abnormality noted 2. White matter changes most compatible small-vessel ischemic disease in this age group noted 3. Bilateral mastoid effusions      ALLAN ALBA MD       Electronically Signed and Approved By: ALLAN ALBA MD on 2/23/2024 at 8:06             CT Head Without Contrast    Result Date: 2/21/2024   No acute intracranial process identified.     LU RIVERA MD       Electronically Signed and Approved By: LU RIVERA MD on 2/21/2024 at 16:25             XR Chest 1 View    Result Date: 2/21/2024   No significant interval change allowing for rotation on the current study.       MIAH GREEN MD       Electronically Signed and Approved By: MIAH GREEN MD on 2/21/2024 at 2:07             XR Chest 1 View    Result Date: 2/20/2024   Stable appearance of the chest allowing for difficulties with positioning.       MIAH GREEN MD       Electronically Signed and Approved By: MIAH GREEN MD on 2/20/2024 at 5:55                       Results for orders placed during the hospital encounter of 02/16/24    Adult Transthoracic Echo Complete W/ Cont if Necessary Per Protocol    Interpretation Summary    Left ventricular systolic function is normal. Left ventricular ejection fraction appears to be 61 - 65%.    Left ventricular wall thickness is consistent with mild concentric hypertrophy.    Left ventricular diastolic function is consistent with (grade I) impaired relaxation.    There are no significant valvular abnormalities.      Labs Pending at Discharge:        Time spent on Discharge including  face to face service:  more than 35 minutes    Electronically signed by Nguyễn Farnsworth DO, 02/25/24, 11:40 AM EST.

## 2024-02-28 DIAGNOSIS — Z79.4 TYPE 2 DIABETES MELLITUS WITH DIABETIC NEUROPATHY, WITH LONG-TERM CURRENT USE OF INSULIN: ICD-10-CM

## 2024-02-28 DIAGNOSIS — E11.40 POORLY CONTROLLED TYPE 2 DIABETES MELLITUS WITH NEUROPATHY: ICD-10-CM

## 2024-02-28 DIAGNOSIS — E11.40 TYPE 2 DIABETES MELLITUS WITH DIABETIC NEUROPATHY, WITH LONG-TERM CURRENT USE OF INSULIN: ICD-10-CM

## 2024-02-28 DIAGNOSIS — E11.65 POORLY CONTROLLED TYPE 2 DIABETES MELLITUS WITH NEUROPATHY: ICD-10-CM

## 2024-02-28 RX ORDER — INSULIN LISPRO 100 [IU]/ML
INJECTION, SOLUTION INTRAVENOUS; SUBCUTANEOUS
Qty: 15 ML | Refills: 1 | OUTPATIENT
Start: 2024-02-28

## 2024-03-13 ENCOUNTER — PATIENT OUTREACH (OUTPATIENT)
Dept: CASE MANAGEMENT | Facility: OTHER | Age: 59
End: 2024-03-13
Payer: MEDICAID

## 2024-03-13 ENCOUNTER — TELEPHONE (OUTPATIENT)
Dept: FAMILY MEDICINE CLINIC | Age: 59
End: 2024-03-13
Payer: MEDICAID

## 2024-03-13 ENCOUNTER — READMISSION MANAGEMENT (OUTPATIENT)
Dept: CALL CENTER | Facility: HOSPITAL | Age: 59
End: 2024-03-13
Payer: MEDICAID

## 2024-03-13 DIAGNOSIS — S82.142A CLOSED FRACTURE OF LEFT TIBIAL PLATEAU, INITIAL ENCOUNTER: Primary | ICD-10-CM

## 2024-03-13 NOTE — TELEPHONE ENCOUNTER
Caller: [facility]    Relationship to patient: Other    Best call back number: [phone]    New or established patient?  [] New  [x] Established    Date of discharge: 03/13/2024    Facility discharged from:   [facility]    Diagnosis/Symptoms: COPD    Length of stay (If applicable): 02/25/2024-03/13/2024    Specialty Only: Did you see a Sh[employer] health provider?    [] Yes  [] No  If so, who?

## 2024-03-13 NOTE — OUTREACH NOTE
Prep Survey      Flowsheet Row Responses   [facility] facility patient discharged from? Non-BH   Is LACE score < 7 ? Non-BH Discharge   Eligibility [facility]   Hospital [facility]   Date of Admission 02/25/24   Date of Discharge 03/13/24   Discharge Disposition Home or Self Care   Discharge diagnosis COPD   Does the patient have one of the following disease processes/diagnoses(primary or secondary)? COPD   Prep survey completed? Yes            Karen VIRAMONTES - Registered Nurse

## 2024-03-13 NOTE — OUTREACH NOTE
AMBULATORY CASE MANAGEMENT NOTE    Name and Relationship of Patient/Support Person: Kami Wallis G - Emergency Contact    Gómez was discharged today from [facility].  Kami is coming by tomorrow with medications and a list to help straighten out from what he had.   She was not at home when I called. He is scheduled to see PCP next week.    She had some insurance questions, unfortunately I am unable to answer.     Tara GOMEZ  Ambulatory Case Management    3/13/2024, 14:49 EDT

## 2024-03-14 ENCOUNTER — PATIENT OUTREACH (OUTPATIENT)
Dept: CASE MANAGEMENT | Facility: OTHER | Age: 59
End: 2024-03-14
Payer: MEDICAID

## 2024-03-14 ENCOUNTER — TRANSITIONAL CARE MANAGEMENT TELEPHONE ENCOUNTER (OUTPATIENT)
Dept: CALL CENTER | Facility: HOSPITAL | Age: 59
End: 2024-03-14
Payer: MEDICAID

## 2024-03-14 DIAGNOSIS — U07.1 COVID: Primary | ICD-10-CM

## 2024-03-14 NOTE — OUTREACH NOTE
Call Center TCM Note      Flowsheet Row Responses   [facility] patient discharged from? Non-BH  [[facility]]   Does the patient have one of the following disease processes/diagnoses(primary or secondary)? Other   TCM attempt successful? Yes   Change in Health Status Readmitted  [Per wife Pt admitted to [facility] 3/13/24]   Has home health visited the patient within 72 hours of discharge? N/A   Psychosocial issues? No   TCM call completed? Yes            Ann Ruth RN    3/14/2024, 09:54 EDT

## 2024-03-14 NOTE — OUTREACH NOTE
AMBULATORY CASE MANAGEMENT NOTE    Name and Relationship of Patient/Support Person:  -     Kami called to see if we could sort through his medications.  She reports that she had to take him back to the ER, he has covid.  She brought me discharge papers with updated medication list.     He will need iron infusion again.      Updated medications for Gómez.  Faxed copy to [facility]  Ambulatory Case Management    3/14/2024, 10:32 EDT

## 2024-03-18 ENCOUNTER — PATIENT OUTREACH (OUTPATIENT)
Dept: CASE MANAGEMENT | Facility: OTHER | Age: 59
End: 2024-03-18
Payer: MEDICAID

## 2024-03-18 DIAGNOSIS — D50.8 IRON DEFICIENCY ANEMIA SECONDARY TO INADEQUATE DIETARY IRON INTAKE: Primary | ICD-10-CM

## 2024-03-18 NOTE — OUTREACH NOTE
AMBULATORY CASE MANAGEMENT NOTE    Name and Relationship of Patient/Support Person: Kami Wallis G - Emergency Contact    Kami called to inquire about Gómez's meds. They are at the  waiting since Thursday.  He will most likely get discharged tomorrow and do not see any medication changes.   Will update Harinder at [facility] for update.    Kami asked about hospital bed, will be better, more efficient from hospital since we have not had a F2F in some time.   Will call hospital since the note they have on file states that I was going to take care of it.  Spoke with Kenna on Medsurg unit that will let the other team know that he needs.  Information given on how to contact me.     Maria Isabel, care coordinator returned call to report that Dr. Herring will add appropriate documentation to support hospital bed. If not, can contact [facility] for assistance.     Tara GOMEZ  Ambulatory Case Management    3/18/2024, 11:20 EDT

## 2024-03-19 NOTE — PROGRESS NOTES
Primary Care Provider  Kimmy Riley MD     Referring Provider  No ref. provider found     Chief Complaint  COPD, Sleep Apnea, Follow-up (3 Month /), Shortness of Breath (Constant /Patient states hi O2 level is dropping multiple times a day ), Cough (Productive, clear now, patient states it was green ), Wheezing (Constant ), and Chest Tightness    Subjective          History of Presenting Illness  Patient is a 58-year-old male, patient of Dr. Carolina's who presents for management of bronchiectasis and very severe COPD and has a history of recurrent Pseudomonas infection and chronic respiratory failure who presents for a follow-up visit today.  Patient's wife is present with the patient in the office today.  Since last office visit patient was hospitalized at [facility] from 2/16/2024 to 2/25/2024 for altered mental status with hypercapnia, septic arthritis, and ulcer of left foot.  Per discharge summary report, patient is a morbidly obese male with COPD at 2 L of oxygen at baseline, CKD stage III/IV, obstructive sleep apnea not adherent to BiPAP who presented to an outside hospital on 2/11/2024 with pneumonia.  Patient was recently admitted to [place] [facility] with a tibial plateau fracture.  Patient was transferred to [facility] for further evaluation as there was a concern that he may need podiatry intervention for foot infection.  Podiatry evaluated with no acute intervention required apart from local wound care and some superficial debridement.  Patient also had acute hypoxemic and hypercapnic respiratory failure requiring continuous BiPAP initially and had issues with nonadherence to this as outpatient.  Patient was treated with IV antibiotics since her incision over to oral antibiotics and completed treatment during his hospitalization.  Patient's hospitalization was complicated with agitation/confusion which worsened after beginning medication for his agitation.  This eventually did  resolve.  Patient was also seen by neurology for his confusion.  Patient's MRI did show an old coronary infarct however no acute infarct.  Patient also had an EEG which was abnormal and concerning for seizure-like activity and neurology has started patient on Keppra. Patient was also seen by cardiology as he was having some chest pains with elevated troponin however his echocardiogram appears to show diastolic dysfunction and at this time cardiology recommended continued medical management. Patient will be discharged to the rehab facility today in stable condition per discharge summary report.  Patient states that after hospitalization he had another hospitalization at [facility] for acute on chronic respiratory failure, Pseudomonas pneumonia, COVID-19 pneumonia, and COPD exacerbation.  Per [facility] discharge summary report, patient was in the hospital for approximately 14 days and was treated for COVID-19 with remdesivir and Decadron.  Patient sputum did grow Pseudomonas and he has a history of bronchiectasis so there was a question of colonization versus infection however patient was treated with Zosyn and completed course.  Patient also had cellulitis with necrotic tissue that required debridement with a history of MRSA and so he was initially treated vancomycin and switch to daptomycin.  There is some evidence of fluid overload and he received diuresis.  Patient was discharged from the hospital medically stable per discharge summary report.  Patient states that he is feeling better since hospital stays.  Patient states that his breathing is back to baseline.  Patient states that he does get short of breath that is worse with exertion, moderate in severity, and improved with rest.  Patient states that he is taking Breztri and Advair inhalers and uses Ventolin inhaler and DuoNeb nebulizer treatments as needed.  Patient is also taking Singulair.  Patient is on 3 L of oxygen per minute  via nasal cannula.  Patient is taking Pepcid for reflux.  Patient states that he is only using his BiPAP machine sporadically as he is having a hard time bleeding his oxygen into his machine.  Patient denies any morning headaches or excessive daytime sleepiness.  Patient denies fever, chills, night sweats, swollen glands in the head and neck, unintentional weight loss, hemoptysis, purulent sputum production, dysphagia, chest pain, palpitations, chest tightness, abdominal pain, nausea, vomiting, and diarrhea.   Patient denies any leg swelling, orthopnea, paroxysmal nocturnal dyspnea.  Patient also denies any hematuria, hematochezia, hematemesis, and epistaxis.  Patient is able to perform activities of daily living.        Review of Systems     Family History   Problem Relation Age of Onset    Coronary artery disease Mother     Hypertension Mother     Diabetes type II Mother     Asthma Father     Diabetes type II Sister     Cancer Sister         Social History     Socioeconomic History    Marital status:    Tobacco Use    Smoking status: Former     Current packs/day: 0.00     Average packs/day: 1 pack/day for 12.0 years (12.0 ttl pk-yrs)     Types: Cigarettes     Start date:      Quit date:      Years since quittin.2     Passive exposure: Past    Smokeless tobacco: Never   Vaping Use    Vaping status: Never Used   Substance and Sexual Activity    Alcohol use: Not Currently    Drug use: Never    Sexual activity: Defer        Past Medical History:   Diagnosis Date    Age-related cognitive decline     Allergic contact dermatitis     Allergies     Anemia     Bronchiectasis with acute lower respiratory infection     Charcot foot due to diabetes mellitus 9/10/2013    Chronic diastolic (congestive) heart failure     Chronic kidney disease     Chronic respiratory failure with hypoxia     Closed supracondylar fracture of femur 2022    COPD (chronic obstructive pulmonary disease)     Deep vein  thrombosis (DVT) of lower extremity associated with air travel 1/13/2023    Dependence on supplemental oxygen     Eczema     Erectile dysfunction     due to organic reasons    Essential (primary) hypertension     Fracture     closed fracture of other tarsal and metatarsal bones    Fracture of proximal humerus 1/13/2023    GERD without esophagitis     High risk medication use     Hypercholesteremia     Hypomagnesemia     Infected stasis ulcer of left lower extremity 1/13/2023    Insomnia     Low back pain     Major depressive disorder     Morbid (severe) obesity due to excess calories     MRSA pneumonia     Muscle weakness     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 without evidence of necrosis     Obstructive sleep apnea (adult) (pediatric)     Other forms of dyspnea     Other long term (current) drug therapy     Other specified noninfective gastroenteritis and colitis     Other spondylosis, lumbar region     Pain in both knees     Paroxysmal atrial fibrillation     Peripheral neuropathy     attributed to type 2 diabetes    Pneumonia, unspecified organism     Polyneuropathy     Rash and other nonspecific skin eruption     Syncope and collapse     Tachycardia     Tinnitus 1/13/2023    Type 1 diabetes mellitus with diabetic chronic kidney disease     Type 2 diabetes mellitus     Unspecified fall, initial encounter     Urinary retention         Immunization History   Administered Date(s) Administered    Flu Vaccine Quad PF >36MO 10/18/2016, 10/16/2017, 11/04/2019    Flu Vaccine Split Quad 11/04/2019    Fluzone (or Fluarix & Flulaval for VFC) >6mos 10/18/2016, 10/16/2017, 11/04/2019, 10/19/2022, 11/14/2023    Influenza Injectable Mdck Pf Quad 10/19/2022    Influenza Quad Vaccine (Inpatient) 09/19/2013    Influenza, Unspecified 09/21/2020    PEDS-Pneumococcal Conjugate (PCV7) 11/14/2023    Pneumococcal Conjugate 20-Valent (PCV20) 11/14/2023    Pneumococcal Polysaccharide  (PPSV23) 11/20/1997    Tdap 09/18/2018    influenza Split 11/04/2019       Allergies   Allergen Reactions    Benadryl [Diphenhydramine] Itching    Proventil [Albuterol] Other (See Comments)     Mouth sores            Current Outpatient Medications:     apixaban (Eliquis) 5 MG tablet tablet, Take 1 tablet by mouth Every 12 (Twelve) Hours., Disp: 180 tablet, Rfl: 2    aspirin 81 MG EC tablet, Take 1 tablet by mouth Daily., Disp: , Rfl:     atorvastatin (LIPITOR) 20 MG tablet, Take 1 tablet by mouth Every Night., Disp: , Rfl:     Budeson-Glycopyrrol-Formoterol (Breztri Aerosphere) 160-9-4.8 MCG/ACT aerosol inhaler, Inhale 2 puffs 2 (Two) Times a Day for 30 days. Rinse mouth out after each use, Disp: 1 each, Rfl: 5    bumetanide (BUMEX) 2 MG tablet, Take 1 tablet by mouth 2 (Two) Times a Day., Disp: , Rfl:     calcium citrate (CALCITRATE) 950 (200 Ca) MG tablet, Take 1 tablet by mouth Daily., Disp: 90 tablet, Rfl: 2    carvedilol (COREG) 25 MG tablet, Take 1 tablet by mouth Every 12 (Twelve) Hours., Disp: 60 tablet, Rfl: 0    Cholecalciferol (Vitamin D3) 50 MCG (2000 UT) tablet, Take 1 tablet by mouth Daily., Disp: 90 tablet, Rfl: 2    collagenase 250 UNIT/GM ointment, Apply 1 Application topically to the appropriate area as directed Daily., Disp: , Rfl:     dapagliflozin (Farxiga) 5 MG tablet tablet, Take 1 tablet by mouth Daily., Disp: 90 tablet, Rfl: 2    docusate sodium (COLACE) 100 MG capsule, Take 1 capsule by mouth Daily., Disp: , Rfl:     DULoxetine (CYMBALTA) 30 MG capsule, Take 1 capsule by mouth Daily., Disp: , Rfl:     exenatide er (Bydureon BCise) 2 MG/0.85ML auto-injector injection, Inject 0.85 mL under the skin into the appropriate area as directed 1 (One) Time Per Week., Disp: , Rfl:     famotidine (PEPCID) 40 MG tablet, Take 1 tablet by mouth Every Morning., Disp: 90 tablet, Rfl: 2    ferrous gluconate (FERGON) 324 MG tablet, Take 1 tablet by mouth Daily With Breakfast., Disp: 90 tablet, Rfl: 2     finasteride (PROSCAR) 5 MG tablet, Take 1 tablet by mouth Daily., Disp: 90 tablet, Rfl: 2    folic acid (FOLVITE) 1 MG tablet, Take 1 tablet by mouth Daily., Disp: 90 tablet, Rfl: 2    Insulin Lispro (humaLOG) 100 UNIT/ML injection, Inject 4-24 Units under the skin into the appropriate area as directed 4 (Four) Times a Day Before Meals & at Bedtime., Disp: , Rfl:     ipratropium-albuterol (DUO-NEB) 0.5-2.5 mg/3 ml nebulizer, Take 3 mL by nebulization Every 4 (Four) Hours As Needed for Wheezing or Shortness of Air., Disp: 360 mL, Rfl: 5    levETIRAcetam (KEPPRA) 500 MG tablet, Take 1 tablet by mouth 2 (Two) Times a Day., Disp: , Rfl:     magnesium oxide (MAG-OX) 400 tablet tablet, Take 1 tablet by mouth Daily. Started by [place], Disp: , Rfl:     montelukast (SINGULAIR) 10 MG tablet, Take 1 tablet by mouth every night at bedtime., Disp: 90 tablet, Rfl: 2    Multiple Vitamins-Iron (multivitamin with iron) tablet tablet, Take 1 tablet by mouth Daily., Disp: , Rfl:     nicotine (NICODERM CQ) 21 MG/24HR patch, Place 1 patch on the skin as directed by provider Daily As Needed (smoking cessation)., Disp: , Rfl:     NIFEdipine XL (PROCARDIA XL) 30 MG 24 hr tablet, Take 1 tablet by mouth Every Morning., Disp: 90 tablet, Rfl: 2    O2 (OXYGEN), 2 Liter O2 - CONTINUOUS (route: Oxygen), Disp: , Rfl:     ondansetron (ZOFRAN) 4 MG tablet, Take 1 tablet by mouth Every 8 (Eight) Hours As Needed for Nausea or Vomiting., Disp: , Rfl:     silver sulfadiazine (SILVADENE, SSD) 1 % cream, Apply 1 Application topically to the appropriate area as directed 2 (Two) Times a Day. For his shin, Disp: , Rfl:     Sodium Hypochlorite (DAKIN'S) 0.125 % topical solution, Apply  topically to the appropriate area as directed Every 12 (Twelve) Hours., Disp: , Rfl:     traZODone (DESYREL) 50 MG tablet, Take 1 tablet by mouth Every Night., Disp: , Rfl:     Ventolin  (90 Base) MCG/ACT inhaler, Inhale 2 puffs Every 4 (Four) Hours As Needed for  "Wheezing or Shortness of Air for up to 30 days., Disp: 18 g, Rfl: 11    vitamin C (ASCORBIC ACID) 500 MG tablet, Take 1 tablet by mouth 2 (Two) Times a Day., Disp: , Rfl:     Zinc 50 MG tablet, Take 1 tablet by mouth Daily., Disp: , Rfl:     doxycycline (ADOXA) 100 MG tablet, Take 1 tablet by mouth 2 (Two) Times a Day for 10 days., Disp: 20 tablet, Rfl: 0    insulin detemir (Levemir) 100 UNIT/ML injection, Inject 10 Units under the skin into the appropriate area as directed Every Night., Disp: 15 mL, Rfl: 2     Objective     Physical Exam  Vital Signs:   WDWN, Alert, NAD.    HEENT:  PERRL, EOMI.  OP, nares clear, no sinus tenderness  Neck:  Supple, no JVD, no thyromegaly.  Lymph: no axillary, cervical, supraclavicular lymphadenopathy noted bilaterally  Chest: Mildly decreased breath sounds throughout. No wheezes, rales, or rhonchi appreciated.  Normal work of breathing noted.  Patient is able speak full sentences without difficulty.  CV: RRR, no MGR, pulses 2+, equal.  Abd:  Soft, NT, ND, + BS, no HSM  EXT:  no clubbing, no cyanosis, no edema, no joint tenderness  Neuro:  A&Ox3, CN grossly intact, no focal deficits.  Skin: No rashes or lesions noted.    /55 (BP Location: Left arm, Patient Position: Sitting, Cuff Size: Large Adult)   Pulse 73   Resp 16   Ht 175.3 cm (69\")   Wt 122 kg (270 lb) Comment: Per patient, unable to stand  SpO2 97% Comment: 3L  BMI 39.87 kg/m²         Result Review :   I have reviewed discharge summary and imaging study reports from both recent hospital stays.  See scanned reports.    Procedures:      XR Chest 1 View    Result Date: 3/14/2024  There has been no significant interval change  . Continued follow-up recommended.      XR Chest 1 View    Result Date: 2/21/2024   No significant interval change allowing for rotation on the current study.       MIAH GREEN MD       Electronically Signed and Approved By: MIAH GREEN MD on 2/21/2024 at 2:07             XR Chest 1 " View    Result Date: 2/20/2024   Stable appearance of the chest allowing for difficulties with positioning.       MIAH GREEN MD       Electronically Signed and Approved By: MIAH GREEN MD on 2/20/2024 at 5:55           d and Approved By: MIAH GREEN MD on 2/17/2024 at 1:21             XR Chest 1 View    Result Date: 2/17/2024   No significant interval change.       MIAH GREEN MD       Electronically Signed and Approved By: MIAH GREEN MD on 2/17/2024 at 1:01             XR Chest 1 View    Result Date: 2/16/2024    1. There is borderline heart size with pulmonary vascular congestion 2. Chronic bilateral airspace disease       WHIT HARDIN MD       Electronically Signed and Approved By: WHIT HARDIN MD on 2/16/2024 at 16:52             XR chest AP portable    Result Date: 2/16/2024  No significant interval change. Images personally reviewed, interpreted and dictated by SHANT Velasquez.            X-ray chest AP only    Result Date: 2/11/2024  Right suprahilar pneumonia superimposed on chronic change. Images reviewed, interpreted, and dictated by Dr. Abad Billingsley. Transcribed by Joseph Denton PA-C.    CT chest for pulmonary embolus    Result Date: 2/11/2024  1. No evidence of pulmonary embolism. 2. New bilateral extensive multifocal pneumonia, right greater than left. Pneumonia is predominantly peripheral to the areas of chronic cystic bronchiectasis.    This study was performed using dose reduction techniques to achieve radiation exposure as low as reasonably achievable (ALARA)          Assessment and Plan      Assessment:  1.  Very severe COPD with an FEV1 of 21% in 2008, noncompliant with inhalers.  2.  Bronchiectasis.  3.  Recurrent Pseudomonas pneumonia and bronchopneumonia with multidrug-resistant Pseudomonas.  4.  Pseudomonas pneumonia with hospitalization at [facility] in March 2024.  5.  COVID-19 pneumonia with hospitalization at [facility] in March  2024.  6.  Chronic dyspnea.  7.  Chronic cough.  8.  Chronic wheezing.  9.  GERD.  10.  History of PE in July 2019 on Eliquis without cor pulmonale.  11.  Obstructive sleep apnea: On BiPAP machine.    12.  Tobacco abuse of cigarettes in remission.          Plan:  1.  Patient reports that he has been taking both Breztri and Advair.  Patient is advised that he cannot take both Breztri and Advair as this is duplicate therapy.  Will stop Advair.  Patient to continue Breztri 2 puffs twice daily as prescribed and rinse mouth out after each use.  2.  Patient reports that he is not able to take Proventil inhaler due to an allergy, however has been able to tolerate Ventolin inhaler and DuoNeb nebulizer treatments without any issues.  Patient to continue Ventolin inhaler and DuoNeb nebulizer treatments as needed.  3.  Patient reports that he is having some difficulties with his BiPAP machine and bleeding oxygen in to his machine.  A message has been sent to our DME coordinator to contact patient's DME company to see if patient can receive assistance with this.  Patient to continue BiPAP with oxygen bled in at current settings at night and with naps and clean mask and tubing daily.  4.  Continue oxygen to keep SPO2 at 89% and above.  5.  Continue Singulair.  6.  For  GERD,  patient to continue Pepcid.   Patient is also advised to sleep with the head of the bed elevated and do not eat 3-4 hours prior to bedtime.  7.  Continue flutter valve and nebulizer treatments to assist with airway clearance.  8.  Vaccination status:  patient reports they are up-to-date with flu and pneumonia vaccine.  Patient declines COVID-19 vaccinations.  Discussed with patient the benefits of vaccination including decreased risk of severe illness, hospitalization and death related to flu, pneumonia, and COVID-19.  Patient verbalized understanding.  Patient is advised to continue to follow CDC recommendations such as social distancing, wearing a mask,  and washing hands for least 20 seconds.  9.  Smoking status: Patient is a former cigarette smoker.  Not eligible for lung cancer screening as patient reports he quit smoking in 1993.  10.  Patient to call the office, 911, or go to the ER with new or worsening symptoms.  11.  Follow-up in 6 to 8 weeks, sooner if needed.          Follow Up   Return for 6-8 weeks in [place].  Patient was given instructions and counseling regarding his condition or for health maintenance advice. Please see specific information pulled into the AVS if appropriate.

## 2024-03-25 ENCOUNTER — READMISSION MANAGEMENT (OUTPATIENT)
Dept: CALL CENTER | Facility: HOSPITAL | Age: 59
End: 2024-03-25
Payer: MEDICAID

## 2024-03-26 ENCOUNTER — TRANSITIONAL CARE MANAGEMENT TELEPHONE ENCOUNTER (OUTPATIENT)
Dept: CALL CENTER | Facility: HOSPITAL | Age: 59
End: 2024-03-26
Payer: MEDICAID

## 2024-03-26 ENCOUNTER — PATIENT OUTREACH (OUTPATIENT)
Dept: CASE MANAGEMENT | Facility: OTHER | Age: 59
End: 2024-03-26
Payer: MEDICAID

## 2024-03-26 DIAGNOSIS — I50.32 CHRONIC DIASTOLIC (CONGESTIVE) HEART FAILURE: ICD-10-CM

## 2024-03-26 DIAGNOSIS — J42 CHRONIC BRONCHITIS, UNSPECIFIED CHRONIC BRONCHITIS TYPE: ICD-10-CM

## 2024-03-26 DIAGNOSIS — D50.8 IRON DEFICIENCY ANEMIA SECONDARY TO INADEQUATE DIETARY IRON INTAKE: Primary | ICD-10-CM

## 2024-03-26 DIAGNOSIS — N18.31 STAGE 3A CHRONIC KIDNEY DISEASE: ICD-10-CM

## 2024-03-26 NOTE — OUTREACH NOTE
Prep Survey      Flowsheet Row Responses   [facility] facility patient discharged from? Non-BH   Is LACE score < 7 ? Non-BH Discharge   Eligibility [facility]   Hospital [facility]   Date of Admission 03/13/24   Date of Discharge 03/25/24   Discharge Disposition Home or Self Care   Discharge diagnosis Covid/COPD/Foot ulcer-IRRIGATION AND DEBRIDEMENT, WOUND, HEEL AND FOOT   Does the patient have one of the following disease processes/diagnoses(primary or secondary)? General Surgery   Does the patient have Home health ordered? No   Prep survey completed? Yes            BRE DELUNA - Registered Nurse

## 2024-03-26 NOTE — OUTREACH NOTE
Call Center TCM Note      Flowsheet Row Responses   [facility] patient discharged from? Non-   Does the patient have one of the following disease processes/diagnoses(primary or secondary)? General Surgery   TCM attempt successful? Yes   Call start time 1639   Change in Health Status Readmitted   Call end time 1639   Discharge diagnosis Covid/COPD/Foot ulcer-IRRIGATION AND DEBRIDEMENT, WOUND, HEEL AND FOOT   Call end time [id number]            Layla Calzada RN    3/26/2024, 16:40 EDT

## 2024-03-26 NOTE — OUTREACH NOTE
AMBULATORY CASE MANAGEMENT NOTE    Name and Relationship of Patient/Support Person: BimalKami G - Emergency Contact    Reached out to Kami today, Gómez's appointment today was cancelled due to hospitalization.    He did make it home, became more short of breath, EMS took him back to local hospital.    Discussed with patient ( if he gets discharged in time) that the oxygen company is faxing/calling for completion of oxygen CMN.  Called [facility] and requested that pulmonology sign orders and he has an appointment on 4/4/24.    Inquired if he had received an iron infusion inpatient, she states no only blood.  Will have to get him scheduled at [facility] upon discharge.     Tara GOMEZ  Ambulatory Case Management    3/26/2024, 10:57 EDT  [facility] End of Month Documentation    This Chronic Medical Management Care Plan for Preston Wallis, 58 y.o. male, has been established; monitored and managed; reviewed and a new plan of care implemented for the month of March.  A cumulative time of 55  minutes was spent on this patient record this month, including phone call with care giver; electronic communication with primary care provider; electronic communication with pharmacist; chart review; phone call with patient.    Regarding the patient's problems: has Polyneuropathy; Paroxysmal atrial fibrillation; Obstructive sleep apnea; MRSA pneumonia; Low back pain; Chronic diastolic heart failure; Allergies; COPD exacerbation; Chronic anticoagulation; Benign prostatic hyperplasia; Impaired mobility and endurance; Stage 3a chronic kidney disease; Iron deficiency anemia secondary to inadequate dietary iron intake; Vitamin D deficiency; Class 3 severe obesity with serious comorbidity in adult; Lower extremity edema; Elevated alkaline phosphatase level; Venous insufficiency (chronic) (peripheral); Tobacco abuse, in remission; History of Pseudomonas pneumonia; Chronic dyspnea; Gastroesophageal reflux disease; Bronchiectasis without complication; ERIC  (acute kidney injury); Altered mental status; Hyperlipidemia; Luetscher's syndrome; Neurogenic bladder; Class 1 obesity; Pneumonia due to Pseudomonas species; Seizures; Primary osteoarthritis of left knee; Other constipation; Chronic obstructive pulmonary disease; Type 2 DM with CKD stage 3 and hypertension; Essential hypertension; Stage 3b chronic kidney disease; Annual physical exam; Long-term use of high-risk medication; Personal history of PE (pulmonary embolism); Encounter for aftercare for healing closed traumatic fracture of left femur; Chronic pain of left knee; Primary osteoarthritis of right knee; Sepsis; Bronchiectasis; Bacterial pneumonia; Anemia; Closed fracture of left tibial plateau; Septic joint; Septic arthritis; Skin ulcer of left heel, limited to breakdown of skin; Ulcer of left foot, limited to breakdown of skin; and Left foot pain on their problem list., the following items were addressed: medications; transitions to medical care; medical records; referrals to community service providers and any changes can be found within the plan section of the note.  A detailed listing of time spent for chronic care management is tracked within each outreach encounter.  Current medications include:  has a current medication list which includes the following prescription(s): apixaban, aspirin, atorvastatin, breztri aerosphere, bumetanide, calcium citrate, carvedilol, vitamin d3, collagenase, farxiga, docusate sodium, duloxetine, bydureon bcise, famotidine, ferrous gluconate, finasteride, fluticasone-salmeterol, folic acid, levemir, insulin lispro, levetiracetam, magnesium oxide, montelukast, multivitamin with iron, nicotine, nifedipine xl, o2, ondansetron, silver sulfadiazine, sodium hypochlorite, trazodone, ventolin hfa, vitamin c, and zinc. and the patient is reported to be caregiver will take responsibility for med compliance; patient is compliant with medication protocol,  Medications are reported to be  non-effective in controlling symptoms and changes have been made to the medication protocol.  Regarding these diagnoses, referrals were made to the following provider(s):  specialists.  All notes on chart for PCP to review.    The patient was monitored remotely for pain; medications; blood glucose; mood & behavior.    The patient's physical needs include:  help taking medications as prescribed; medication education; needs assistance with ADLs; resources for disability needs; DME supplies.     The patient's mental support needs include:  continued support    The patient's cognitive support needs include:  medication; continued support; needs assistance with ADLs; health care    The patient's psychosocial support needs include:  continued support; coordination of community providers; medication management or adherence    The patient's functional needs include: health care coverage; medication education; needs assistance for ADLs; physical healthcare; physician referral; resources for disability needs    The patient's environmental needs include:  resources for disability needs    Care Plan overall comments:  Still not at goal, however improving. will continue to follow. Has many upcoming appointments and referrals to specialists.    Refer to previous outreach notes for more information on the areas listed above.    Monthly Billing Diagnoses  (D50.8) Iron deficiency anemia secondary to inadequate dietary iron intake    (I50.32) Chronic diastolic (congestive) heart failure    (N18.31) Stage 3a chronic kidney disease    (J42) Chronic bronchitis, unspecified chronic bronchitis type    Medications   Medications have been reconciled    Care Plan progress this month:      Recently Modified Care Plans Updates made since 2/24/2024 12:00 AM      No recently modified care plans.            Current Specialty Plan of Care Status signed by both patient and provider    Instructions   Patient was provided an electronic copy of care  plan  CCM services were explained and offered and patient has accepted these services.  Patient has given their written consent to receive CCM services and understands that this includes the authorization of electronic communication of medical information with the other treating providers.  Patient understands that they may stop CCM services at any time and these changes will be effective at the end of the calendar month and will effectively revocate the agreement of CCM services.  Patient understands that only one practitioner can furnish and be paid for CCM services during one calendar month.  Patient also understands that there may be co-payment or deductible fees in association with CCM services.  Patient will continue with at least monthly follow-up calls with the Ambulatory .    Tara GOMEZ  Ambulatory Case Management    3/26/2024, 10:57 EDT

## 2024-04-01 ENCOUNTER — PATIENT OUTREACH (OUTPATIENT)
Dept: CASE MANAGEMENT | Facility: OTHER | Age: 59
End: 2024-04-01
Payer: MEDICAID

## 2024-04-01 DIAGNOSIS — D50.8 IRON DEFICIENCY ANEMIA SECONDARY TO INADEQUATE DIETARY IRON INTAKE: Primary | ICD-10-CM

## 2024-04-01 NOTE — OUTREACH NOTE
AMBULATORY CASE MANAGEMENT NOTE    Name and Relationship of Patient/Support Person: Kami Wallis G - Emergency Contact    Kami called attempting to get Gómez a follow up appointment with PCP on same day seeing pulmonology.  Spoke with PCP and she was agreeable to work in.  Left message on [employer].    Reviewed medication discharge list and no changes made during hospitalization.  Kami states [employer] needs a form filled out for medical necessity due to there are cars registered to the address, however Kami cannot get him in the car.       Tara GOMEZ  Ambulatory Case Management    4/1/2024, 14:25 EDT

## 2024-04-02 ENCOUNTER — READMISSION MANAGEMENT (OUTPATIENT)
Dept: CALL CENTER | Facility: HOSPITAL | Age: 59
End: 2024-04-02
Payer: MEDICAID

## 2024-04-02 ENCOUNTER — TRANSITIONAL CARE MANAGEMENT TELEPHONE ENCOUNTER (OUTPATIENT)
Dept: CALL CENTER | Facility: HOSPITAL | Age: 59
End: 2024-04-02
Payer: MEDICAID

## 2024-04-02 NOTE — OUTREACH NOTE
Prep Survey      Flowsheet Row Responses   [facility] facility patient discharged from? Non-BH   Is LACE score < 7 ? Non-BH Discharge   Eligibility [facility]   Hospital [facility]   Date of Discharge 03/29/24   Discharge Disposition Home or Self Care   Discharge diagnosis Acute on chronic congestive heart failure, COPD exacerbation   Does the patient have one of the following disease processes/diagnoses(primary or secondary)? CHF   Prep survey completed? Yes            Jesica RODARTE - Registered Nurse

## 2024-04-02 NOTE — OUTREACH NOTE
Call Center TCM Note      Flowsheet Row Responses   [facility] patient discharged from? Non-BH  [[facility]]   Does the patient have one of the following disease processes/diagnoses(primary or secondary)? Other  [CHF]   TCM attempt successful? Yes  [verbal release from 2021 lists Kami, wife]   Call start time [id number]   Call end time 0954   General alerts for this patient Patient is followed by ACM   Discharge diagnosis Acute on chronic congestive heart failure, COPD exacerbation   Meds reviewed with patient/caregiver? Yes   Does the patient have all medications ordered at discharge? N/A   Prescription comments reports no changes in current meds   Is the patient taking all medications as directed (includes completed medication regime)? Yes   Comments Pt has an appt with PCP on 4/4/24@1200pm (will send a note)-Pulmonary on 4/4/24   Does the patient have an appointment with their PCP within 7-14 days of discharge? Yes   Has home health visited the patient within 72 hours of discharge? N/A   What DME was ordered? hospital bed   Has all DME been delivered? Yes   DME comments Pt has home O2 --uses at 2 lpm reports his normal sats are 86-92%   Psychosocial issues? No   Did the patient receive a copy of their discharge instructions? Yes   Nursing interventions Reviewed instructions with patient   What is the patient's perception of their health status since discharge? Same  [Reports he is much the same, sats as noted and reports he is having edema to feet/hands which was present during admission, taking diuretics and reports compliance with his diet.  Pt unable to stand to weigh and has no scale, uses a wheelchair.]   Is the patient/caregiver able to teach back signs and symptoms related to disease process for when to call PCP? Yes   Is the patient/caregiver able to teach back signs and symptoms related to disease process for when to call 911? Yes   TCM call completed? Yes   Call end time [id number]            Carmen Zurita  RN    4/2/2024, 10:01 EDT

## 2024-04-04 ENCOUNTER — OFFICE VISIT (OUTPATIENT)
Dept: FAMILY MEDICINE CLINIC | Age: 59
End: 2024-04-04
Payer: MEDICAID

## 2024-04-04 ENCOUNTER — PATIENT OUTREACH (OUTPATIENT)
Dept: CASE MANAGEMENT | Facility: OTHER | Age: 59
End: 2024-04-04
Payer: MEDICAID

## 2024-04-04 ENCOUNTER — TELEPHONE (OUTPATIENT)
Dept: PULMONOLOGY | Facility: CLINIC | Age: 59
End: 2024-04-04

## 2024-04-04 ENCOUNTER — OFFICE VISIT (OUTPATIENT)
Dept: PULMONOLOGY | Facility: CLINIC | Age: 59
End: 2024-04-04
Payer: MEDICAID

## 2024-04-04 VITALS
WEIGHT: 270 LBS | DIASTOLIC BLOOD PRESSURE: 55 MMHG | OXYGEN SATURATION: 97 % | RESPIRATION RATE: 16 BRPM | BODY MASS INDEX: 39.99 KG/M2 | HEART RATE: 73 BPM | SYSTOLIC BLOOD PRESSURE: 150 MMHG | HEIGHT: 69 IN

## 2024-04-04 VITALS
SYSTOLIC BLOOD PRESSURE: 130 MMHG | HEART RATE: 68 BPM | DIASTOLIC BLOOD PRESSURE: 56 MMHG | BODY MASS INDEX: 39.87 KG/M2 | HEIGHT: 69 IN | OXYGEN SATURATION: 94 % | TEMPERATURE: 98.4 F

## 2024-04-04 DIAGNOSIS — N18.32 STAGE 3B CHRONIC KIDNEY DISEASE (CKD): ICD-10-CM

## 2024-04-04 DIAGNOSIS — I12.9 TYPE 2 DM WITH CKD STAGE 3 AND HYPERTENSION: ICD-10-CM

## 2024-04-04 DIAGNOSIS — Z86.711 PERSONAL HISTORY OF PE (PULMONARY EMBOLISM): ICD-10-CM

## 2024-04-04 DIAGNOSIS — G47.33 OBSTRUCTIVE SLEEP APNEA SYNDROME: ICD-10-CM

## 2024-04-04 DIAGNOSIS — J44.9 CHRONIC OBSTRUCTIVE PULMONARY DISEASE, UNSPECIFIED COPD TYPE: ICD-10-CM

## 2024-04-04 DIAGNOSIS — E11.22 TYPE 2 DM WITH CKD STAGE 3 AND HYPERTENSION: ICD-10-CM

## 2024-04-04 DIAGNOSIS — E08.621 DIABETIC ULCER OF LEFT HEEL ASSOCIATED WITH DIABETES MELLITUS DUE TO UNDERLYING CONDITION, LIMITED TO BREAKDOWN OF SKIN: Primary | ICD-10-CM

## 2024-04-04 DIAGNOSIS — J47.9 BRONCHIECTASIS WITHOUT COMPLICATION: ICD-10-CM

## 2024-04-04 DIAGNOSIS — G62.9 PERIPHERAL POLYNEUROPATHY: ICD-10-CM

## 2024-04-04 DIAGNOSIS — J47.1 BRONCHIECTASIS WITH ACUTE EXACERBATION: ICD-10-CM

## 2024-04-04 DIAGNOSIS — U07.1 PNEUMONIA DUE TO COVID-19 VIRUS: ICD-10-CM

## 2024-04-04 DIAGNOSIS — Z87.01 HISTORY OF PSEUDOMONAS PNEUMONIA: ICD-10-CM

## 2024-04-04 DIAGNOSIS — I10 ESSENTIAL HYPERTENSION: ICD-10-CM

## 2024-04-04 DIAGNOSIS — L97.421 DIABETIC ULCER OF LEFT HEEL ASSOCIATED WITH DIABETES MELLITUS DUE TO UNDERLYING CONDITION, LIMITED TO BREAKDOWN OF SKIN: Primary | ICD-10-CM

## 2024-04-04 DIAGNOSIS — L97.421 SKIN ULCER OF LEFT HEEL, LIMITED TO BREAKDOWN OF SKIN: ICD-10-CM

## 2024-04-04 DIAGNOSIS — J12.82 PNEUMONIA DUE TO COVID-19 VIRUS: ICD-10-CM

## 2024-04-04 DIAGNOSIS — F17.201 TOBACCO ABUSE, IN REMISSION: Primary | ICD-10-CM

## 2024-04-04 DIAGNOSIS — N18.30 TYPE 2 DM WITH CKD STAGE 3 AND HYPERTENSION: ICD-10-CM

## 2024-04-04 DIAGNOSIS — R63.8 DECREASED ORAL INTAKE: Primary | ICD-10-CM

## 2024-04-04 DIAGNOSIS — I48.0 PAROXYSMAL ATRIAL FIBRILLATION: ICD-10-CM

## 2024-04-04 DIAGNOSIS — E11.40 TYPE 2 DIABETES MELLITUS WITH DIABETIC NEUROPATHY, WITH LONG-TERM CURRENT USE OF INSULIN: ICD-10-CM

## 2024-04-04 DIAGNOSIS — R06.09 CHRONIC DYSPNEA: ICD-10-CM

## 2024-04-04 DIAGNOSIS — Z79.4 TYPE 2 DIABETES MELLITUS WITH DIABETIC NEUROPATHY, WITH LONG-TERM CURRENT USE OF INSULIN: ICD-10-CM

## 2024-04-04 DIAGNOSIS — R06.2 WHEEZING: ICD-10-CM

## 2024-04-04 DIAGNOSIS — G47.33 OBSTRUCTIVE SLEEP APNEA: ICD-10-CM

## 2024-04-04 DIAGNOSIS — D50.8 IRON DEFICIENCY ANEMIA SECONDARY TO INADEQUATE DIETARY IRON INTAKE: Primary | ICD-10-CM

## 2024-04-04 DIAGNOSIS — L03.116 CELLULITIS OF LEFT LEG: ICD-10-CM

## 2024-04-04 DIAGNOSIS — J15.1 PNEUMONIA DUE TO PSEUDOMONAS SPECIES, UNSPECIFIED LATERALITY, UNSPECIFIED PART OF LUNG: ICD-10-CM

## 2024-04-04 DIAGNOSIS — D50.8 IRON DEFICIENCY ANEMIA SECONDARY TO INADEQUATE DIETARY IRON INTAKE: ICD-10-CM

## 2024-04-04 PROCEDURE — 99214 OFFICE O/P EST MOD 30 MIN: CPT | Performed by: NURSE PRACTITIONER

## 2024-04-04 PROCEDURE — 3078F DIAST BP <80 MM HG: CPT | Performed by: NURSE PRACTITIONER

## 2024-04-04 PROCEDURE — 1160F RVW MEDS BY RX/DR IN RCRD: CPT | Performed by: NURSE PRACTITIONER

## 2024-04-04 PROCEDURE — 1159F MED LIST DOCD IN RCRD: CPT | Performed by: NURSE PRACTITIONER

## 2024-04-04 PROCEDURE — 3077F SYST BP >= 140 MM HG: CPT | Performed by: NURSE PRACTITIONER

## 2024-04-04 RX ORDER — INSULIN DETEMIR 100 [IU]/ML
10 INJECTION, SOLUTION SUBCUTANEOUS NIGHTLY
Qty: 15 ML | Refills: 0
Start: 2024-04-04 | End: 2024-04-04

## 2024-04-04 RX ORDER — IPRATROPIUM BROMIDE AND ALBUTEROL SULFATE 2.5; .5 MG/3ML; MG/3ML
3 SOLUTION RESPIRATORY (INHALATION) EVERY 4 HOURS PRN
COMMUNITY
End: 2024-04-04 | Stop reason: SDUPTHER

## 2024-04-04 RX ORDER — IPRATROPIUM BROMIDE AND ALBUTEROL SULFATE 2.5; .5 MG/3ML; MG/3ML
3 SOLUTION RESPIRATORY (INHALATION) EVERY 4 HOURS PRN
Qty: 360 ML | Refills: 5 | Status: SHIPPED | OUTPATIENT
Start: 2024-04-04

## 2024-04-04 RX ORDER — BUDESONIDE, GLYCOPYRROLATE, AND FORMOTEROL FUMARATE 160; 9; 4.8 UG/1; UG/1; UG/1
2 AEROSOL, METERED RESPIRATORY (INHALATION) 2 TIMES DAILY
Qty: 1 EACH | Refills: 5 | Status: SHIPPED | OUTPATIENT
Start: 2024-04-04 | End: 2024-05-04

## 2024-04-04 RX ORDER — ALBUTEROL SULFATE 90 UG/1
2 AEROSOL, METERED RESPIRATORY (INHALATION) EVERY 4 HOURS PRN
Qty: 18 G | Refills: 11 | Status: SHIPPED | OUTPATIENT
Start: 2024-04-04 | End: 2024-05-04

## 2024-04-04 RX ORDER — INSULIN DETEMIR 100 [IU]/ML
10 INJECTION, SOLUTION SUBCUTANEOUS NIGHTLY
Qty: 15 ML | Refills: 2 | Status: SHIPPED | OUTPATIENT
Start: 2024-04-04

## 2024-04-04 RX ORDER — INSULIN DETEMIR 100 [IU]/ML
10 INJECTION, SOLUTION SUBCUTANEOUS NIGHTLY
Qty: 15 ML | Refills: 2 | Status: SHIPPED | OUTPATIENT
Start: 2024-04-04 | End: 2024-04-04

## 2024-04-04 RX ORDER — DOXYCYCLINE 100 MG/1
100 TABLET ORAL 2 TIMES DAILY
Qty: 20 TABLET | Refills: 0 | Status: SHIPPED | OUTPATIENT
Start: 2024-04-04 | End: 2024-04-14

## 2024-04-04 NOTE — PATIENT INSTRUCTIONS
Sleep Apnea  Sleep apnea affects breathing during sleep. It causes breathing to stop for 10 seconds or more, or to become shallow. People with sleep apnea usually snore loudly.  It can also increase the risk of:  Heart attack.  Stroke.  Being very overweight (obese).  Diabetes.  Heart failure.  Irregular heartbeat.  High blood pressure.  The goal of treatment is to help you breathe normally again.  What are the causes?    The most common cause of this condition is a collapsed or blocked airway.  There are three kinds of sleep apnea:  Obstructive sleep apnea. This is caused by a blocked or collapsed airway.  Central sleep apnea. This happens when the brain does not send the right signals to the muscles that control breathing.  Mixed sleep apnea. This is a combination of obstructive and central sleep apnea.  What increases the risk?  Being overweight.  Smoking.  Having a small airway.  Being older.  Being male.  Drinking alcohol.  Taking medicines to calm yourself (sedatives or tranquilizers).  Having family members with the condition.  Having a tongue or tonsils that are larger than normal.  What are the signs or symptoms?  Trouble staying asleep.  Loud snoring.  Headaches in the morning.  Waking up gasping.  Dry mouth or sore throat in the morning.  Being sleepy or tired during the day.  If you are sleepy or tired during the day, you may also:  Not be able to focus your mind (concentrate).  Forget things.  Get angry a lot and have mood swings.  Feel sad (depressed).  Have changes in your personality.  Have less interest in sex, if you are female.  Be unable to have an erection, if you are male.  How is this treated?    Sleeping on your side.  Using a medicine to get rid of mucus in your nose (decongestant).  Avoiding the use of alcohol, medicines to help you relax, or certain pain medicines (narcotics).  Losing weight, if needed.  Changing your diet.  Quitting smoking.  Using a machine to open your airway while you  sleep, such as:  An oral appliance. This is a mouthpiece that shifts your lower jaw forward.  A CPAP device. This device blows air through a mask when you breathe out (exhale).  An EPAP device. This has valves that you put in each nostril.  A BIPAP device. This device blows air through a mask when you breathe in (inhale) and breathe out.  Having surgery if other treatments do not work.  Follow these instructions at home:  Lifestyle  Make changes that your doctor recommends.  Eat a healthy diet.  Lose weight if needed.  Avoid alcohol, medicines to help you relax, and some pain medicines.  Do not smoke or use any products that contain nicotine or tobacco. If you need help quitting, ask your doctor.  General instructions  Take over-the-counter and prescription medicines only as told by your doctor.  If you were given a machine to use while you sleep, use it only as told by your doctor.  If you are having surgery, make sure to tell your doctor you have sleep apnea. You may need to bring your device with you.  Keep all follow-up visits.  Contact a doctor if:  The machine that you were given to use during sleep bothers you or does not seem to be working.  You do not get better.  You get worse.  Get help right away if:  Your chest hurts.  You have trouble breathing in enough air.  You have an uncomfortable feeling in your back, arms, or stomach.  You have trouble talking.  One side of your body feels weak.  A part of your face is hanging down.  These symptoms may be an emergency. Get help right away. Call your local emergency services (911 in the [place]  Do not wait to see if the symptoms will go away.  Do not drive yourself to the hospital.  Summary  This condition affects breathing during sleep.  The most common cause is a collapsed or blocked airway.  The goal of treatment is to help you breathe normally while you sleep.  This information is not intended to replace advice given to you by your health care provider. Make  "sure you discuss any questions you have with your health care provider.  Document Revised: 07/27/2022 Document Reviewed: 11/26/2021  Elsevier Patient Education © 2023 [employer]  CPAP and BIPAP Information  CPAP and BIPAP are methods that use air pressure to keep your airways open and to help you breathe well. CPAP and BIPAP use different amounts of pressure. Your health care provider will tell you whether CPAP or BIPAP would be more helpful for you.  CPAP stands for \"continuous positive airway pressure.\" With CPAP, the amount of pressure stays the same while you breathe in (inhale) and out (exhale).  BIPAP stands for \"bi-level positive airway pressure.\" With BIPAP, the amount of pressure will be higher when you inhale and lower when you exhale. This allows you to take larger breaths.  CPAP or BIPAP may be used in the hospital, or your health care provider may want you to use it at home. You may need to have a sleep study before your health care provider can order a machine for you to use at home.  What are the advantages?  CPAP or BIPAP can be helpful if you have:  Sleep apnea.  Chronic obstructive pulmonary disease (COPD).  Heart failure.  Medical conditions that cause muscle weakness, including muscular dystrophy or amyotrophic lateral sclerosis (ALS).  Other problems that cause breathing to be shallow, weak, abnormal, or difficult.  CPAP and BIPAP are most commonly used for obstructive sleep apnea (MILADY) to keep the airways from collapsing when the muscles relax during sleep.  What are the risks?  Generally, this is a safe treatment. However, problems may occur, including:  Irritated skin or skin sores if the mask does not fit properly.  Dry or stuffy nose or nosebleeds.  Dry mouth.  Feeling gassy or bloated.  Sinus or lung infection if the equipment is not cleaned properly.  When should CPAP or BIPAP be used?  In most cases, the mask only needs to be worn during sleep. Generally, the mask needs to be worn " throughout the night and during any daytime naps. People with certain medical conditions may also need to wear the mask at other times, such as when they are awake. Follow instructions from your health care provider about when to use the machine.  What happens during CPAP or BIPAP?    Both CPAP and BIPAP are provided by a small machine with a flexible plastic tube that attaches to a plastic mask that you wear. Air is blown through the mask into your nose or mouth. The amount of pressure that is used to blow the air can be adjusted on the machine. Your health care provider will set the pressure setting and help you find the best mask for you.  Tips for using the mask  Because the mask needs to be snug, some people feel trapped or closed-in (claustrophobic) when first using the mask. If you feel this way, you may need to get used to the mask. One way to do this is to hold the mask loosely over your nose or mouth and then gradually apply the mask more snugly. You can also gradually increase the amount of time that you use the mask.  Masks are available in various types and sizes. If your mask does not fit well, talk with your health care provider about getting a different one. Some common types of masks include:  Full face masks, which fit over the mouth and nose.  Nasal masks, which fit over the nose.  Nasal pillow or prong masks, which fit into the nostrils.  If you are using a mask that fits over your nose and you tend to breathe through your mouth, a chin strap may be applied to help keep your mouth closed.  Use a skin barrier to protect your skin as told by your health care provider.  Some CPAP and BIPAP machines have alarms that may sound if the mask comes off or develops a leak.  If you have trouble with the mask, it is very important that you talk with your health care provider about finding a way to make the mask easier to tolerate. Do not stop using the mask. There could be a negative impact on your health if  you stop using the mask.  Tips for using the machine  Place your CPAP or BIPAP machine on a secure table or stand near an electrical outlet.  Know where the on/off switch is on the machine.  Follow instructions from your health care provider about how to set the pressure on your machine and when you should use it.  Do not eat or drink while the CPAP or BIPAP machine is on. Food or fluids could get pushed into your lungs by the pressure of the CPAP or BIPAP.  For home use, CPAP and BIPAP machines can be rented or purchased through home health care companies. Many different brands of machines are available. Renting a machine before purchasing may help you find out which particular machine works well for you. Your health insurance company may also decide which machine you may get.  Keep the CPAP or BIPAP machine and attachments clean. Ask your health care provider for specific instructions.  Check the humidifier if you have a dry stuffy nose or nosebleeds. Make sure it is working correctly.  Follow these instructions at home:  Take over-the-counter and prescription medicines only as told by your health care provider. Ask if you can take sinus medicine if your sinuses are blocked.  Do not use any products that contain nicotine or tobacco. These products include cigarettes, chewing tobacco, and vaping devices, such as e-cigarettes. If you need help quitting, ask your health care provider.  Keep all follow-up visits. This is important.  Contact a health care provider if:  You have redness or pressure sores on your head, face, mouth, or nose from the mask or head gear.  You have trouble using the CPAP or BIPAP machine.  You cannot tolerate wearing the CPAP or BIPAP mask.  Someone tells you that you snore even when wearing your CPAP or BIPAP.  Get help right away if:  You have trouble breathing.  You feel confused.  Summary  CPAP and BIPAP are methods that use air pressure to keep your airways open and to help you breathe  well.  If you have trouble with the mask, it is very important that you talk with your health care provider about finding a way to make the mask easier to tolerate. Do not stop using the mask. There could be a negative impact to your health if you stop using the mask.  Follow instructions from your health care provider about when to use the machine.  This information is not intended to replace advice given to you by your health care provider. Make sure you discuss any questions you have with your health care provider.  Document Revised: 07/27/2022 Document Reviewed: 11/26/2021  Elsevier Patient Education © 2023 [employer]

## 2024-04-04 NOTE — PROGRESS NOTES
Preston Wallis presents to [facility].    Chief Complaint:  pseudomonas pneumonia and covid URI infections    Subjective     History of Present Illness:  HPI     Date of Admission: 3/25/2024   Date of Discharge: 3/29/2024    Discharge Diagnoses:  Acute on chronic respiratory failure with hypoxia (HCC), Pseudomonas pneumonia, COVID-19 pneumonia, COPD acute on chronic exacerbation, diabetic ulcer with cellulitis of the left foot, Heart failure with preserved ejection fraction, Chronic kidney disease stage IV, Sleep apnea (stopped using his cpap), h/o seizures, Paroxysmal atrial fibrillation, Anemia,  Diabetes mellitus    Hospital Course:  I am seeing Gómez today to follow-up for his recent hospitalization.  He has been in the hospital twice this past month.  The first hospitalization involved in be admitted for 14 days for COVID-19 infection and he was treated with remdesivir and Decadron.  He then presented to the ER with acute shortness of air and hypoxia.  He is O2 dependent at home on 2 L nasal cannula continuous.   His sputum grew Pseudomonas and he has known chronic colonization but specialist decided to go ahead and treat with Zosyn and he completed course.  He also presents with 2 diabetic decubitus ulcers on his left foot on the heel and lateral foot.  He has cellulitis with necrotic tissue and this required debridement by Dr. Remington Leo in the hospital.  He also has underlying history of MRSA so he was initially treated with vancomycin and switched to daptomycin.  He presents today with ongoing eschar but healing diabetic decubitus ulcers bilaterally without any erythematous or sign of infection.  However his entire left shin has significant fiery red erythema with multiple small skin breaks throughout    Today he is not feeling well, O2 is dropping to 74% when he lays down at night, he no longer has a cough, he is SOA with activity.  He is chronically hypoxic and is on 2  liters during the day NC, and 3 liters at night when O2 sats drop.    He has chronic diabetes and has not been taking his Levemir because he says his blood sugars have been ranging from .  He also has Farxiga but not has not been on this continuously      He has diastolic heart failure with preserved ejection fraction and was found to be in CHF exacerbation while in the hospital and was treated with diuretics and tolerated well.  His wife says his left lower extremity is still swollen but his right lower extremity looks good.  He is currently on Bumex 2 mg daily for this.  In addition he has paroxysmal A-fib and is in normal sinus rhythm and stable on Eliquis and Coreg.  He is to follow-up with his cardiologist as directed outpatient    He also presented with chronic kidney disease stage III-IV with a baseline creatinine 1.5 to 1.7    He has chronic sleep apnea and has not been using his CPAP at home because he says that his CPAP is inconsistent and he needs follow-up for evaluation of his equipment.  He does not tolerate BiPAP.    Underlying history of seizures are stable on Keppra no seizures appreciated within the past year     He has chronic iron deficiency anemia with a hemoglobin of 7.5 in the hospital.  He had acute blood loss anemia-foot bleeds occasionally. His hemoglobin has trended down to 7.0 and he was symptomatic and he was treated with 1 unit of packed red blood cells during his first hospitalization.        Studies Performed:  Chest x-ray  Impression:     Cardiomegaly with worsening left greater than right   opacities and likely small bilateral pleural effusions.     Procedures Performed:   1. Excisional debridement of left heel and left fifth metatarsal wounds involving the skin, subcutaneous tissue and muscle. Total wound debrided area was 26 cm²  Performed by Dr. Leo on 3/18/2024    Discharge Medications: Albuterol nebulizer solution, albuterol HFA, sorbic acid with Shantelle hips 500 mg  daily, ASA 81 mg daily, atorvastatin 20 mg nightly, but history which she is not currently taking and needs to restart, Bumex 2 mg twice daily, bydureon 2 mg daily,carvediloL 25 MG tablet bid, vitamin D3 50 mcg (2,000 unit) daily. docusate sodium 100 MG capsule bid, DULoxetine 30 MG capsule daily. Eliquis 5 mg Tab tablet 2 (two) times daily.  Pepcid 40 mg daily, Farxiga 5 mg daily he is not currently taking, iron 324 mg daily, finasteride 5 mg daily, folic acid 1 mg daily, sliding scale insulin lispro, ipratropium-albuteroL 0.5-2.5 (3) mg/3 mL nebulizer solution every 6 hours as needed, long-acting insulin Lantus or Levemir 25 units twice a day, Keppra 500 mg twice daily, magnesium 400 mg daily, Minerin Lotn topical lotion, Singulair 10 mg nightly, nifedipine 30 mg XL daily, Zofran 4 mg as needed, Symbicort 2 puffs 160/4.5 twice daily, trazodone 50 mg nightly                    Result Review   The following data was reviewed by Kimmy Riley MD on 04/04/2024.  Lab Results   Component Value Date    WBC 9.61 03/03/2024    HGB 7.3 (L) 03/03/2024    HCT 24.7 (L) 03/03/2024    MCV 91.8 03/03/2024     03/03/2024     Lab Results   Component Value Date    GLUCOSE 54 (L) 03/11/2024    GLUCOSE 54 (L) 03/11/2024    BUN 41 (H) 03/11/2024    BUN 41 (H) 03/11/2024    CREATININE 2.41 (H) 03/11/2024    CREATININE 2.41 (H) 03/11/2024    EGFR 30.4 (L) 03/11/2024    EGFR 30.4 (L) 03/11/2024    BCR 17.0 03/11/2024    BCR 17.0 03/11/2024    K 4.4 03/11/2024    K 4.4 03/11/2024    CO2 36.8 (H) 03/11/2024    CO2 36.8 (H) 03/11/2024    CALCIUM 8.8 03/11/2024    CALCIUM 8.8 03/11/2024    PROTENTOTREF 7.1 08/22/2022    ALBUMIN 2.9 (L) 03/11/2024    BILITOT 0.2 02/26/2024    AST 19 02/26/2024    ALT 15 02/26/2024     Lab Results   Component Value Date    CHOL 130 09/07/2023    CHLPL 165 08/26/2020    TRIG 205 (H) 09/07/2023    HDL 48 09/07/2023    LDL 49 09/07/2023     Lab Results   Component Value Date    TSH 1.790 02/22/2024      Lab Results   Component Value Date    HGBA1C 8.20 (H) 12/13/2023     Lab Results   Component Value Date    PSA 0.203 05/25/2023    PSA 0.725 03/17/2022     Lab Results   Component Value Date    IRON 41 (L) 03/01/2024      Lab Results   Component Value Date    MGAQ53YG 25.5 (L) 05/25/2023               Assessment and Plan:   Diagnoses and all orders for this visit:    1. Diabetic ulcer of left heel associated with diabetes mellitus due to underlying condition, limited to breakdown of skin (Primary)  Comments:  Will set him up for wound care and his wife will continue to dress and bandage foot.  Will treat the cellulitis left shin with doxycycline 100 mgbid x 10 days  Orders:  -     Ambulatory Referral to Wound Clinic    2. Cellulitis of left leg  Comments:  Will treat the cellulitis on the left shin with doxycycline 100 mg twice daily x 10 days  Orders:  -     doxycycline (ADOXA) 100 MG tablet; Take 1 tablet by mouth 2 (Two) Times a Day for 10 days.  Dispense: 20 tablet; Refill: 0    3. Type 2 diabetes mellitus with diabetic neuropathy, with long-term current use of insulin  Comments:  He is not keeping BS diary, will have him keep a BS diary and restart Levemir 10 mg nightly and Farxiga 5 mg daily.  Orders:  -     Discontinue: insulin detemir (Levemir) 100 UNIT/ML injection; Inject 10 Units under the skin into the appropriate area as directed Every Night.  Dispense: 15 mL; Refill: 0  -     Discontinue: insulin detemir (Levemir) 100 UNIT/ML injection; Inject 10 Units under the skin into the appropriate area as directed Every Night.  Dispense: 15 mL; Refill: 2  -     insulin detemir (Levemir) 100 UNIT/ML injection; Inject 10 Units under the skin into the appropriate area as directed Every Night.  Dispense: 15 mL; Refill: 2  -     Hemoglobin A1c; Future    4. Stage 3b chronic kidney disease (CKD)  Comments:  He is avoid NSAIDs and push fluids 64 ounces a day, most recent GFR was 44  Orders:  -     Comprehensive  Metabolic Panel; Future    5. Essential hypertension  Comments:  BP is stable and well-controlled.  No changes in current meds or Tx plan    6. Bronchiectasis with acute exacerbation  Comments:  He saw his pulmonologist today.Pending bronchoscopy for further eval.Off antibiotics.  Having nighttime hypoxia.  Will increase O2 4 L if O2 sats less than 88%    7. Paroxysmal atrial fibrillation  Comments:  He is in normal sinus rhythm today.  Follow-up with cardiology as directed.  No changes in current meds or Tx plan    8. Iron deficiency anemia secondary to inadequate dietary iron intake  Comments:  Continue iron supplementation  Orders:  -     CBC (No Diff); Future    9. Obstructive sleep apnea syndrome  Comments:  He has been set up for referral to a sleep specialist to reeval his CPAP machine, he is not currently on CPAP due to machine issues    10. Peripheral polyneuropathy  Comments:  He is off gabapentin p.o. by mouth.  Will try him on topical gabapentin              Objective     Medications:  Current Outpatient Medications   Medication Instructions    apixaban (ELIQUIS) 5 mg, Oral, Every 12 Hours    aspirin 81 mg, Oral, Daily    atorvastatin (LIPITOR) 20 mg, Oral, Nightly    Budeson-Glycopyrrol-Formoterol (Breztri Aerosphere) 160-9-4.8 MCG/ACT aerosol inhaler 2 puffs, Inhalation, 2 Times Daily, Rinse mouth out after each use    bumetanide (BUMEX) 2 MG tablet Take 1 tablet by mouth 2 (Two) Times a Day.    Bydureon BCise 2 mg, Subcutaneous, Weekly    calcium citrate (CALCITRATE) 950 mg, Oral, Daily    carvedilol (COREG) 25 mg, Oral, Every 12 Hours Scheduled    collagenase 250 UNIT/GM ointment 1 Application, Topical, Every 24 Hours Scheduled    docusate sodium (COLACE) 100 mg, Oral, Daily    doxycycline (ADOXA) 100 mg, Oral, 2 Times Daily    DULoxetine (CYMBALTA) 30 mg, Oral, Daily    famotidine (PEPCID) 40 mg, Oral, Every Morning    Farxiga 5 mg, Oral, Daily    ferrous gluconate (FERGON) 324 mg, Oral, Daily With  "Breakfast    finasteride (PROSCAR) 5 mg, Oral, Daily    folic acid (FOLVITE) 1 mg, Oral, Daily    Insulin Lispro (HUMALOG) 4-24 Units, Subcutaneous, 4 Times Daily Before Meals & Nightly    ipratropium-albuterol (DUO-NEB) 0.5-2.5 mg/3 ml nebulizer 3 mL, Nebulization, Every 4 Hours PRN    Levemir 10 Units, Subcutaneous, Nightly    levETIRAcetam (KEPPRA) 500 mg, Oral, 2 Times Daily    magnesium oxide (MAG-OX) 400 mg, Oral, Daily, Started by [place]     montelukast (SINGULAIR) 10 mg, Oral, Every Night at Bedtime    Multiple Vitamins-Iron (multivitamin with iron) tablet tablet 1 tablet, Oral, Daily    nicotine (NICODERM CQ) 21 MG/24HR patch 1 patch, Transdermal, Daily PRN    NIFEdipine XL (PROCARDIA XL) 30 mg, Oral, Every Morning    O2 (OXYGEN) 2 Liter O2 - CONTINUOUS (route: Oxygen)    ondansetron (ZOFRAN) 4 mg, Oral, Every 8 Hours PRN    silver sulfadiazine (SILVADENE, SSD) 1 % cream Apply 1 Application topically to the appropriate area as directed 2 (Two) Times a Day. For his shin    Sodium Hypochlorite (DAKIN'S) 0.125 % topical solution Topical, Every 12 Hours Scheduled    traZODone (DESYREL) 50 mg, Oral, Nightly    Ventolin  (90 Base) MCG/ACT inhaler 2 puffs, Inhalation, Every 4 Hours PRN    vitamin C (ASCORBIC ACID) 500 mg, Oral, 2 Times Daily    Vitamin D3 50 mcg, Oral, Daily    Zinc 50 MG tablet 1 tablet, Oral, Daily        Vital Signs:   /56 (BP Location: Right arm, Patient Position: Sitting, Cuff Size: Adult)   Pulse 68   Temp 98.4 °F (36.9 °C) (Oral)   Ht 175.3 cm (69\")   SpO2 94%   BMI 39.87 kg/m²     Class 2 Severe Obesity (BMI >=35 and <=39.9). Obesity-related health conditions include the following: hypertension, diabetes mellitus, and dyslipidemias. Obesity is unchanged. BMI is is above average; BMI management plan is completed. We discussed portion control.       Physical Exam:  Physical Exam  Vitals and nursing note reviewed.   Constitutional:       General: He is not in acute " distress.     Appearance: Normal appearance. He is not ill-appearing, toxic-appearing or diaphoretic.   HENT:      Head: Normocephalic and atraumatic.      Right Ear: Tympanic membrane, ear canal and external ear normal.      Left Ear: Tympanic membrane, ear canal and external ear normal.      Nose: No congestion or rhinorrhea.      Mouth/Throat:      Mouth: Mucous membranes are moist.      Pharynx: Oropharynx is clear. No oropharyngeal exudate or posterior oropharyngeal erythema.   Eyes:      Extraocular Movements: Extraocular movements intact.      Conjunctiva/sclera: Conjunctivae normal.      Pupils: Pupils are equal, round, and reactive to light.   Cardiovascular:      Rate and Rhythm: Normal rate and regular rhythm.      Heart sounds: Normal heart sounds.   Pulmonary:      Effort: Pulmonary effort is normal.      Breath sounds: Normal breath sounds. Examination of the right-upper field reveals wheezing. Examination of the left-upper field reveals wheezing. Examination of the right-middle field reveals wheezing. Examination of the left-middle field reveals wheezing. No wheezing, rhonchi or rales.      Comments: Pips and squeaks on exam  Abdominal:      General: Abdomen is flat.      Palpations: Abdomen is soft. There is no mass.      Tenderness: There is no abdominal tenderness.      Hernia: No hernia is present.   Musculoskeletal:      Cervical back: Neck supple. No rigidity.      Right lower leg: No edema.      Left lower le+ No edema.   Lymphadenopathy:      Cervical: No cervical adenopathy.   Skin:     General: Skin is warm and dry.          Neurological:      General: No focal deficit present.      Mental Status: He is alert and oriented to person, place, and time. Mental status is at baseline.   Psychiatric:         Mood and Affect: Mood normal.         Behavior: Behavior normal.         Thought Content: Thought content normal.         Judgment: Judgment normal.           Review of Systems:  Review of  Systems   Constitutional:  Positive for fatigue. Negative for chills and fever.   HENT:  Negative for congestion, ear discharge and sore throat.    Respiratory:  Negative for shortness of breath.    Cardiovascular:  Positive for leg swelling. Negative for chest pain and palpitations.   Gastrointestinal:  Negative for abdominal pain, constipation, diarrhea, nausea, vomiting and GERD.   Genitourinary:  Positive for nocturia. Negative for dysuria, flank pain and frequency.   Neurological:  Positive for weakness and numbness. Negative for dizziness and headache.   Psychiatric/Behavioral:  Positive for sleep disturbance. Negative for suicidal ideas and depressed mood.               Follow Up   Return in about 3 months (around 7/4/2024), or if symptoms worsen or fail to improve, for Recheck.    Part of this note may be an electronic transcription/translation of spoken language to printed   text using the Dragon Dictation System.            Medical History:  Medications Discontinued During This Encounter   Medication Reason    insulin detemir (Levemir) 100 UNIT/ML injection Reorder    insulin detemir (Levemir) 100 UNIT/ML injection     insulin detemir (Levemir) 100 UNIT/ML injection       Past Medical History:    Age-related cognitive decline    Allergic contact dermatitis    Allergies    Anemia    Bronchiectasis with acute lower respiratory infection    Charcot foot due to diabetes mellitus    Chronic diastolic (congestive) heart failure    Chronic kidney disease    Chronic respiratory failure with hypoxia    Closed supracondylar fracture of femur    COPD (chronic obstructive pulmonary disease)    Deep vein thrombosis (DVT) of lower extremity associated with air travel    Dependence on supplemental oxygen    Eczema    Erectile dysfunction    due to organic reasons    Essential (primary) hypertension    Fracture    closed fracture of other tarsal and metatarsal bones    Fracture of proximal humerus    GERD without  esophagitis    High risk medication use    Hypercholesteremia    Hypomagnesemia    Infected stasis ulcer of left lower extremity    Insomnia    Low back pain    Major depressive disorder    Morbid (severe) obesity due to excess calories    MRSA pneumonia    Muscle weakness    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 without evidence of necrosis    Obstructive sleep apnea (adult) (pediatric)    Other forms of dyspnea    Other long term (current) drug therapy    Other specified noninfective gastroenteritis and colitis    Other spondylosis, lumbar region    Pain in both knees    Paroxysmal atrial fibrillation    Peripheral neuropathy    attributed to type 2 diabetes    Pneumonia, unspecified organism    Polyneuropathy    Rash and other nonspecific skin eruption    Syncope and collapse    Tachycardia    Tinnitus    Type 1 diabetes mellitus with diabetic chronic kidney disease    Type 2 diabetes mellitus    Unspecified fall, initial encounter    Urinary retention     Past Surgical History:    CHOLECYSTECTOMY    CYSTOSCOPY    FEMUR SURGERY    Shravan placed    KNEE SURGERY    OTHER SURGICAL HISTORY    venous port    TIBIAL PLATEAU OPEN REDUCTION INTERNAL FIXATION    Procedure: TIBIAL PLATEAU OPEN REDUCTION INTERNAL FIXATION;  Surgeon: Hugo Kline MD;  Location: [facility];  Service: Orthopedics;  Laterality: Left;    TONSILLECTOMY AND ADENOIDECTOMY      Family History   Problem Relation Age of Onset    Coronary artery disease Mother     Hypertension Mother     Diabetes type II Mother     Asthma Father     Diabetes type II Sister     Cancer Sister      Social History     Tobacco Use    Smoking status: Former     Current packs/day: 0.00     Average packs/day: 1 pack/day for 12.0 years (12.0 ttl pk-yrs)     Types: Cigarettes     Start date:      Quit date:      Years since quittin.2     Passive exposure: Past    Smokeless tobacco: Never   Substance Use  Topics    Alcohol use: Not Currently       Health Maintenance Due   Topic Date Due    ZOSTER VACCINE (1 of 2) Never done    BMI FOLLOWUP  11/03/2023    ANNUAL PHYSICAL  02/07/2024    DIABETIC FOOT EXAM  02/07/2024        Immunization History   Administered Date(s) Administered    Flu Vaccine Quad PF >36MO 10/18/2016, 10/16/2017, 11/04/2019    Flu Vaccine Split Quad 11/04/2019    Fluzone (or Fluarix & Flulaval for VFC) >6mos 10/18/2016, 10/16/2017, 11/04/2019, 10/19/2022, 11/14/2023    Influenza Injectable Mdck Pf Quad 10/19/2022    Influenza Quad Vaccine (Inpatient) 09/19/2013    Influenza, Unspecified 09/21/2020    PEDS-Pneumococcal Conjugate (PCV7) 11/14/2023    Pneumococcal Conjugate 20-Valent (PCV20) 11/14/2023    Pneumococcal Polysaccharide (PPSV23) 11/20/1997    Tdap 09/18/2018    influenza Split 11/04/2019       Allergies   Allergen Reactions    Benadryl [Diphenhydramine] Itching    Proventil [Albuterol] Other (See Comments)     Mouth sores

## 2024-04-04 NOTE — OUTREACH NOTE
AMBULATORY CASE MANAGEMENT NOTE    Name and Relationship of Patient/Support Person: Joey Magaña was in the office today.  Needing referrals for his foot ulcers, referral placed to foot/ankle.   Scheduled appointment on Monday 4/8/24 at 10am.    Requested dietary supplement for nutritional support, called Joey and will fax order to see if he qualifies.   Home health is full at this time, need to call back on Monday to see if can accept referral.   Reached out to Dr. Hanson office to review labs to see if he needs iron infusions   Counseled about using inhalers.     Tara GOMEZ  Ambulatory Case Management    4/4/2024, 14:56 EDT  AMBULATORY CASE MANAGEMENT NOTE    Name and Relationship of Patient/Support Person: Dr. Hanson office -     Faxed order to Joey.   Called Kami [phone] 4:30pm to inform about podiatry appointment on Monday so she can arrange transportation 72 hour notice.   Checked with  and confirmed that Medicaid is primary insurance, will need to call passport.    Tara GOMEZ  Ambulatory Case Management    4/5/2024, 09:35 EDT

## 2024-04-04 NOTE — TELEPHONE ENCOUNTER
Pt is having hard time with his CPAP machine. Pt states that he is having a hard time bleeding oxygen through machine. Can we please contact [employer] to see if they can assist him Thanks.

## 2024-04-04 NOTE — Clinical Note
I have ordered a CMP, CBC and hemoglobin A1c.  Can you see if home health can draw this on him next week.  Dr. Riley

## 2024-04-08 ENCOUNTER — PATIENT OUTREACH (OUTPATIENT)
Dept: CASE MANAGEMENT | Facility: OTHER | Age: 59
End: 2024-04-08
Payer: COMMERCIAL

## 2024-04-08 DIAGNOSIS — R60.0 LOWER EXTREMITY EDEMA: Primary | Chronic | ICD-10-CM

## 2024-04-08 NOTE — OUTREACH NOTE
AMBULATORY CASE MANAGEMENT NOTE    Name and Relationship of Patient/Support Person: Kami Wallis G - Emergency Contact    Kami called requesting something to help Gómez with his anxiety.  He is having increased shortness of air when he lies down in bed.  He is up and down all night.  She states that she tried to reassure him that his oxygen level was ok, it will drop to 90%.    Asked if they had heard from meets regarding his Cpap equipment, they have not heard from anyone.  Possibly the contact number is not correct.   Reached out to [facility], they are waiting on a form to be filled out by pulmonology and faxed back to [facility].   Had  check insurance, active with [employer].   Check to see if anyone has flushed his port?   Elizabeth states his legs are better. Still wrapping.     [facility] called back to report they are not taking [employer], neither is [facility].  [facility] will not accept to to high admission frequency.      Kami reports that his port was accessed during the last hospitalization.  Explained that that will need to be addressed - flushing - routinely.      Requested lab orders from nephrology to be faxed.    Will need also to reach out to heme to see if he needs iron infusions at this time     Kami did move his podiatry appointment to Thursday, Gómez did not feel like going.     Tara GOMEZ  Ambulatory Case Management    4/8/2024, 13:42 EDT

## 2024-04-10 ENCOUNTER — PATIENT OUTREACH (OUTPATIENT)
Dept: CASE MANAGEMENT | Facility: OTHER | Age: 59
End: 2024-04-10
Payer: COMMERCIAL

## 2024-04-10 DIAGNOSIS — D50.8 IRON DEFICIENCY ANEMIA SECONDARY TO INADEQUATE DIETARY IRON INTAKE: Primary | ICD-10-CM

## 2024-04-10 DIAGNOSIS — D50.9 IRON DEFICIENCY ANEMIA, UNSPECIFIED IRON DEFICIENCY ANEMIA TYPE: Primary | ICD-10-CM

## 2024-04-10 NOTE — Clinical Note
His iron study in the hospital was low, he did receive 1 unit of blood, but can we recheck his level to see if he needs transfusion-pended for you to sign

## 2024-04-10 NOTE — OUTREACH NOTE
AMBULATORY CASE MANAGEMENT NOTE    Name and Relationship of Patient/Support Person: Nephrology -     Reached out to nephrology to see if they wanted to add any additional labs for next week.    Reached out to heme office to see if they needed to get him scheduled for iron infusions from hospital labs in March, iron low.   When hematology office call back, ask about port flushing.     Scott from hematology returned call, we reviewed his office note and states no further workup needed.  He had only received iron in the hospital in 2022, not outpatient.  Will repeat studies and see if he needs to be sent to outpatient infusion for iron.  Pended for PCP to sign.     Nephrology returned call and faxing labs.    Nephrology reports they do not need any labs, there were plenty done in the hospital.    Tara GOMEZ  Ambulatory Case Management    4/10/2024, 09:20 EDT

## 2024-04-11 ENCOUNTER — PATIENT OUTREACH (OUTPATIENT)
Dept: CASE MANAGEMENT | Facility: OTHER | Age: 59
End: 2024-04-11
Payer: COMMERCIAL

## 2024-04-11 DIAGNOSIS — F41.9 ANXIETY: Primary | ICD-10-CM

## 2024-04-11 DIAGNOSIS — D50.8 IRON DEFICIENCY ANEMIA SECONDARY TO INADEQUATE DIETARY IRON INTAKE: Primary | ICD-10-CM

## 2024-04-11 RX ORDER — BUSPIRONE HYDROCHLORIDE 15 MG/1
15 TABLET ORAL 2 TIMES DAILY PRN
Qty: 60 TABLET | Refills: 0 | Status: ON HOLD | OUTPATIENT
Start: 2024-04-11

## 2024-04-11 NOTE — OUTREACH NOTE
AMBULATORY CASE MANAGEMENT NOTE    Name and Relationship of Patient/Support Person: Kami Wallis G - Emergency Contact    Reached out to Kami to ask about podiatry visit today, she states that it was moved again till next Monday.  Expressed how important this appointment is and to please do not cancel anymore.  He is at risk for losing his foot.    Informed that  did send in a Rx for Gómez for his anxiety.   Kami reports that she almost took him to ER - his oxygen was dropping into the 70's when lying down and then recovers back into the 90's.  They have tried the cpap/bipap without success.   Discussed getting labs and iron level to see if he needs infusion, she has no way to get him here. He has nephrology appointment next week also.        Education Documentation  No documentation found.        Tara GOMEZ  Ambulatory Case Management    4/11/2024, 15:20 EDT

## 2024-04-11 NOTE — PROGRESS NOTES
It says this is closed, not sure if this was already addressed with on call, if not, I will start him on buspar 15 mg bid for his anxiety. DR Riley

## 2024-04-14 ENCOUNTER — HOSPITAL ENCOUNTER (INPATIENT)
Facility: HOSPITAL | Age: 59
LOS: 5 days | Discharge: HOME | DRG: 280 | End: 2024-04-19
Attending: EMERGENCY MEDICINE | Admitting: FAMILY MEDICINE
Payer: COMMERCIAL

## 2024-04-14 ENCOUNTER — APPOINTMENT (OUTPATIENT)
Dept: GENERAL RADIOLOGY | Facility: HOSPITAL | Age: 59
DRG: 280 | End: 2024-04-14
Payer: COMMERCIAL

## 2024-04-14 DIAGNOSIS — J96.02 ACUTE RESPIRATORY FAILURE WITH HYPERCAPNIA: Primary | ICD-10-CM

## 2024-04-14 DIAGNOSIS — Z78.9 DECREASED ACTIVITIES OF DAILY LIVING (ADL): ICD-10-CM

## 2024-04-14 DIAGNOSIS — R26.2 DIFFICULTY WALKING: ICD-10-CM

## 2024-04-14 DIAGNOSIS — R91.1 LUNG NODULE SEEN ON IMAGING STUDY: ICD-10-CM

## 2024-04-14 PROBLEM — J96.22 ACUTE ON CHRONIC RESPIRATORY FAILURE WITH HYPERCAPNIA: Status: ACTIVE | Noted: 2024-04-14

## 2024-04-14 LAB
ALBUMIN SERPL-MCNC: 3.4 G/DL (ref 3.5–5.2)
ALBUMIN/GLOB SERPL: 0.9 G/DL
ALP SERPL-CCNC: 193 U/L (ref 39–117)
ALT SERPL W P-5'-P-CCNC: 17 U/L (ref 1–41)
ANION GAP SERPL CALCULATED.3IONS-SCNC: 6.6 MMOL/L (ref 5–15)
ARTERIAL PATENCY WRIST A: POSITIVE
AST SERPL-CCNC: 20 U/L (ref 1–40)
BASE EXCESS BLDA CALC-SCNC: 10.7 MMOL/L (ref -2–2)
BASOPHILS # BLD AUTO: 0.04 10*3/MM3 (ref 0–0.2)
BASOPHILS NFR BLD AUTO: 0.5 % (ref 0–1.5)
BDY SITE: ABNORMAL
BILIRUB SERPL-MCNC: 0.2 MG/DL (ref 0–1.2)
BUN SERPL-MCNC: 23 MG/DL (ref 6–20)
BUN/CREAT SERPL: 12 (ref 7–25)
CALCIUM SPEC-SCNC: 8.6 MG/DL (ref 8.6–10.5)
CHLORIDE SERPL-SCNC: 93 MMOL/L (ref 98–107)
CO2 SERPL-SCNC: 38.4 MMOL/L (ref 22–29)
COHGB MFR BLD: 0.8 % (ref 0–1.5)
CREAT SERPL-MCNC: 1.92 MG/DL (ref 0.76–1.27)
D-LACTATE SERPL-SCNC: 0.8 MMOL/L (ref 0.5–2)
DEPRECATED RDW RBC AUTO: 54.3 FL (ref 37–54)
EGFRCR SERPLBLD CKD-EPI 2021: 39.9 ML/MIN/1.73
EOSINOPHIL # BLD AUTO: 0.51 10*3/MM3 (ref 0–0.4)
EOSINOPHIL NFR BLD AUTO: 6.5 % (ref 0.3–6.2)
ERYTHROCYTE [DISTWIDTH] IN BLOOD BY AUTOMATED COUNT: 15.7 % (ref 12.3–15.4)
FHHB: 3.3 % (ref 0–5)
GAS FLOW AIRWAY: 4 LPM
GEN 5 2HR TROPONIN T REFLEX: 111 NG/L
GLOBULIN UR ELPH-MCNC: 3.8 GM/DL
GLUCOSE SERPL-MCNC: 234 MG/DL (ref 65–99)
HCO3 BLDA-SCNC: 39.7 MMOL/L (ref 22–26)
HCT VFR BLD AUTO: 26 % (ref 37.5–51)
HGB BLD-MCNC: 7.4 G/DL (ref 13–17.7)
HGB BLDA-MCNC: 8.7 G/DL (ref 13.8–16.4)
HOLD SPECIMEN: NORMAL
HOLD SPECIMEN: NORMAL
IMM GRANULOCYTES # BLD AUTO: 0.07 10*3/MM3 (ref 0–0.05)
IMM GRANULOCYTES NFR BLD AUTO: 0.9 % (ref 0–0.5)
INHALED O2 CONCENTRATION: 36 %
LYMPHOCYTES # BLD AUTO: 1.62 10*3/MM3 (ref 0.7–3.1)
LYMPHOCYTES NFR BLD AUTO: 20.5 % (ref 19.6–45.3)
MCH RBC QN AUTO: 27 PG (ref 26.6–33)
MCHC RBC AUTO-ENTMCNC: 28.5 G/DL (ref 31.5–35.7)
MCV RBC AUTO: 94.9 FL (ref 79–97)
METHGB BLD QL: 0.2 % (ref 0–1.5)
MODALITY: ABNORMAL
MONOCYTES # BLD AUTO: 0.9 10*3/MM3 (ref 0.1–0.9)
MONOCYTES NFR BLD AUTO: 11.4 % (ref 5–12)
NEUTROPHILS NFR BLD AUTO: 4.75 10*3/MM3 (ref 1.7–7)
NEUTROPHILS NFR BLD AUTO: 60.2 % (ref 42.7–76)
NOTE: ABNORMAL
NRBC BLD AUTO-RTO: 0 /100 WBC (ref 0–0.2)
NT-PROBNP SERPL-MCNC: 807.4 PG/ML (ref 0–900)
OXYHGB MFR BLDV: 95.7 % (ref 94–99)
PCO2 BLDA: 89.5 MM HG (ref 35–45)
PH BLDA: 7.26 PH UNITS (ref 7.35–7.45)
PLATELET # BLD AUTO: 312 10*3/MM3 (ref 140–450)
PMV BLD AUTO: 8.3 FL (ref 6–12)
PO2 BLD: 270 MM[HG] (ref 0–500)
PO2 BLDA: 97.2 MM HG (ref 80–100)
POTASSIUM SERPL-SCNC: 4.7 MMOL/L (ref 3.5–5.2)
PROT SERPL-MCNC: 7.2 G/DL (ref 6–8.5)
RBC # BLD AUTO: 2.74 10*6/MM3 (ref 4.14–5.8)
SAO2 % BLDCOA: 96.7 % (ref 95–99)
SODIUM SERPL-SCNC: 138 MMOL/L (ref 136–145)
TROPONIN T DELTA: -7 NG/L
TROPONIN T SERPL HS-MCNC: 118 NG/L
WBC NRBC COR # BLD AUTO: 7.89 10*3/MM3 (ref 3.4–10.8)
WHOLE BLOOD HOLD COAG: NORMAL
WHOLE BLOOD HOLD SPECIMEN: NORMAL

## 2024-04-14 PROCEDURE — 83605 ASSAY OF LACTIC ACID: CPT

## 2024-04-14 PROCEDURE — 83540 ASSAY OF IRON: CPT | Performed by: FAMILY MEDICINE

## 2024-04-14 PROCEDURE — 84466 ASSAY OF TRANSFERRIN: CPT | Performed by: FAMILY MEDICINE

## 2024-04-14 PROCEDURE — 25010000002 FUROSEMIDE PER 20 MG: Performed by: EMERGENCY MEDICINE

## 2024-04-14 PROCEDURE — 83050 HGB METHEMOGLOBIN QUAN: CPT | Performed by: EMERGENCY MEDICINE

## 2024-04-14 PROCEDURE — 71045 X-RAY EXAM CHEST 1 VIEW: CPT

## 2024-04-14 PROCEDURE — 83880 ASSAY OF NATRIURETIC PEPTIDE: CPT

## 2024-04-14 PROCEDURE — 85025 COMPLETE CBC W/AUTO DIFF WBC: CPT

## 2024-04-14 PROCEDURE — 80053 COMPREHEN METABOLIC PANEL: CPT | Performed by: EMERGENCY MEDICINE

## 2024-04-14 PROCEDURE — 82805 BLOOD GASES W/O2 SATURATION: CPT | Performed by: EMERGENCY MEDICINE

## 2024-04-14 PROCEDURE — 36415 COLL VENOUS BLD VENIPUNCTURE: CPT

## 2024-04-14 PROCEDURE — 94799 UNLISTED PULMONARY SVC/PX: CPT

## 2024-04-14 PROCEDURE — 84484 ASSAY OF TROPONIN QUANT: CPT | Performed by: EMERGENCY MEDICINE

## 2024-04-14 PROCEDURE — 82375 ASSAY CARBOXYHB QUANT: CPT | Performed by: EMERGENCY MEDICINE

## 2024-04-14 PROCEDURE — 93005 ELECTROCARDIOGRAM TRACING: CPT | Performed by: EMERGENCY MEDICINE

## 2024-04-14 PROCEDURE — 99291 CRITICAL CARE FIRST HOUR: CPT

## 2024-04-14 PROCEDURE — 36600 WITHDRAWAL OF ARTERIAL BLOOD: CPT | Performed by: EMERGENCY MEDICINE

## 2024-04-14 PROCEDURE — 94660 CPAP INITIATION&MGMT: CPT

## 2024-04-14 PROCEDURE — 94640 AIRWAY INHALATION TREATMENT: CPT

## 2024-04-14 PROCEDURE — 87040 BLOOD CULTURE FOR BACTERIA: CPT

## 2024-04-14 PROCEDURE — 93005 ELECTROCARDIOGRAM TRACING: CPT

## 2024-04-14 RX ORDER — SODIUM CHLORIDE 0.9 % (FLUSH) 0.9 %
10 SYRINGE (ML) INJECTION AS NEEDED
Status: DISCONTINUED | OUTPATIENT
Start: 2024-04-14 | End: 2024-04-19 | Stop reason: HOSPADM

## 2024-04-14 RX ORDER — IPRATROPIUM BROMIDE AND ALBUTEROL SULFATE 2.5; .5 MG/3ML; MG/3ML
3 SOLUTION RESPIRATORY (INHALATION) ONCE
Status: COMPLETED | OUTPATIENT
Start: 2024-04-14 | End: 2024-04-14

## 2024-04-14 RX ORDER — FUROSEMIDE 10 MG/ML
40 INJECTION INTRAMUSCULAR; INTRAVENOUS ONCE
Status: COMPLETED | OUTPATIENT
Start: 2024-04-14 | End: 2024-04-14

## 2024-04-14 RX ADMIN — IPRATROPIUM BROMIDE AND ALBUTEROL SULFATE 3 ML: .5; 3 SOLUTION RESPIRATORY (INHALATION) at 20:25

## 2024-04-14 RX ADMIN — Medication 10 ML: at 21:43

## 2024-04-14 RX ADMIN — FUROSEMIDE 40 MG: 10 INJECTION, SOLUTION INTRAMUSCULAR; INTRAVENOUS at 21:43

## 2024-04-14 NOTE — ED NOTES
PT ARRIVED VIA EMS FROM HOME WITH WORSENING SOA. PT ON 4L VIA NC. PT GIVEN DUO-NEB EN ROUTE AND TOOK ONE AROUND 4 PM AT HOME.PT HAS HX OF COPD AND CHF. PT HAS +3 EDEMA AND A URINARY CATHETER.

## 2024-04-14 NOTE — ED PROVIDER NOTES
Time: 7:52 PM EDT  Date of encounter:  4/14/2024  Independent Historian/Clinical History and Information was obtained by:   Patient    History is limited by: N/A    Chief Complaint: Short of breath      History of Present Illness:  Patient is a 58 y.o. year old male who presents to the emergency department for evaluation of shortness of breath.    HPI    Patient Care Team  Primary Care Provider: Kimmy Riley MD    Past Medical History:     Allergies   Allergen Reactions    Benadryl [Diphenhydramine] Itching    Proventil [Albuterol] Other (See Comments)     Mouth sores       Past Medical History:   Diagnosis Date    Age-related cognitive decline     Allergic contact dermatitis     Allergies     Anemia     Bronchiectasis with acute lower respiratory infection     Charcot foot due to diabetes mellitus 9/10/2013    Chronic diastolic (congestive) heart failure     Chronic kidney disease     Chronic respiratory failure with hypoxia     Closed supracondylar fracture of femur 1/12/2022    COPD (chronic obstructive pulmonary disease)     Deep vein thrombosis (DVT) of lower extremity associated with air travel 1/13/2023    Dependence on supplemental oxygen     Eczema     Erectile dysfunction     due to organic reasons    Essential (primary) hypertension     Fracture     closed fracture of other tarsal and metatarsal bones    Fracture of proximal humerus 1/13/2023    GERD without esophagitis     High risk medication use     Hypercholesteremia     Hypomagnesemia     Infected stasis ulcer of left lower extremity 1/13/2023    Insomnia     Low back pain     Major depressive disorder     Morbid (severe) obesity due to excess calories     MRSA pneumonia     Muscle weakness     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 without evidence of necrosis     Obstructive sleep apnea (adult) (pediatric)     Other forms of dyspnea     Other long term (current) drug therapy     Other specified  noninfective gastroenteritis and colitis     Other spondylosis, lumbar region     Pain in both knees     Paroxysmal atrial fibrillation     Peripheral neuropathy     attributed to type 2 diabetes    Pneumonia, unspecified organism     Polyneuropathy     Rash and other nonspecific skin eruption     Syncope and collapse     Tachycardia     Tinnitus 1/13/2023    Type 1 diabetes mellitus with diabetic chronic kidney disease     Type 2 diabetes mellitus     Unspecified fall, initial encounter     Urinary retention      Past Surgical History:   Procedure Laterality Date    CHOLECYSTECTOMY      CYSTOSCOPY      FEMUR SURGERY Left     Shravan placed    KNEE SURGERY Left     OTHER SURGICAL HISTORY Left     venous port, REMOVED    PORTACATH PLACEMENT Right     TIBIAL PLATEAU OPEN REDUCTION INTERNAL FIXATION Left 12/22/2023    Procedure: TIBIAL PLATEAU OPEN REDUCTION INTERNAL FIXATION;  Surgeon: Hugo Kline MD;  Location: [facility] OR;  Service: Orthopedics;  Laterality: Left;    TONSILLECTOMY AND ADENOIDECTOMY       Family History   Problem Relation Age of Onset    Coronary artery disease Mother     Hypertension Mother     Diabetes type II Mother     Asthma Father     Diabetes type II Sister     Cancer Sister        Home Medications:  Prior to Admission medications    Medication Sig Start Date End Date Taking? Authorizing Provider   apixaban (Eliquis) 5 MG tablet tablet Take 1 tablet by mouth Every 12 (Twelve) Hours. 10/26/23   Kimmy Riley MD   aspirin 81 MG EC tablet Take 1 tablet by mouth Daily. 2/26/24   Nguyễn Farnsworth DO   atorvastatin (LIPITOR) 20 MG tablet Take 1 tablet by mouth Every Night. 2/8/24   Provider, MD Breann   Budeson-Glycopyrrol-Formoterol (Breztri Aerosphere) 160-9-4.8 MCG/ACT aerosol inhaler Inhale 2 puffs 2 (Two) Times a Day for 30 days. Rinse mouth out after each use 4/4/24 5/4/24  Betzaida Nelson APRN   bumetanide (BUMEX) 2 MG tablet Take 1 tablet by mouth 2 (Two) Times a  Day. 12/19/23   Breann Shukla MD   busPIRone (BUSPAR) 15 MG tablet Take 1 tablet by mouth 2 (Two) Times a Day As Needed (anxiety). 4/11/24   Kimmy Riley MD   calcium citrate (CALCITRATE) 950 (200 Ca) MG tablet Take 1 tablet by mouth Daily. 10/26/23   Kimmy Riley MD   carvedilol (COREG) 25 MG tablet Take 1 tablet by mouth Every 12 (Twelve) Hours. 12/28/23   Shabbir Peraza MD   Cholecalciferol (Vitamin D3) 50 MCG (2000 UT) tablet Take 1 tablet by mouth Daily. 10/26/23   Kimmy Riley MD   collagenase 250 UNIT/GM ointment Apply 1 Application topically to the appropriate area as directed Daily. 2/25/24   Nguyễn Farnsworth DO   dapagliflozin (Farxiga) 5 MG tablet tablet Take 1 tablet by mouth Daily. 10/26/23   Kimmy Riley MD   docusate sodium (COLACE) 100 MG capsule Take 1 capsule by mouth Daily.    Breann Shukla MD   doxycycline (ADOXA) 100 MG tablet Take 1 tablet by mouth 2 (Two) Times a Day for 10 days. 4/4/24 4/14/24  Kimmy Riley MD   DULoxetine (CYMBALTA) 30 MG capsule Take 1 capsule by mouth Daily. 2/26/24   Nguyễn Farnsworth DO   exenatide er (Bydureon BCise) 2 MG/0.85ML auto-injector injection Inject 0.85 mL under the skin into the appropriate area as directed 1 (One) Time Per Week.    Breann Shukla MD   famotidine (PEPCID) 40 MG tablet Take 1 tablet by mouth Every Morning. 10/26/23   Kimmy Riley MD   ferrous gluconate (FERGON) 324 MG tablet Take 1 tablet by mouth Daily With Breakfast. 10/26/23   Kimmy Riley MD   finasteride (PROSCAR) 5 MG tablet Take 1 tablet by mouth Daily. 10/26/23   Kimmy Riley MD   folic acid (FOLVITE) 1 MG tablet Take 1 tablet by mouth Daily. 10/26/23   Kimmy Riley MD   insulin detemir (Levemir) 100 UNIT/ML injection Inject 10 Units under the skin into the appropriate area as directed Every Night. 4/4/24   Kimmy Riley MD   Insulin Lispro (humaLOG) 100 UNIT/ML injection Inject 4-24  Units under the skin into the appropriate area as directed 4 (Four) Times a Day Before Meals & at Bedtime. 2/25/24   Nguyễn Farnsworth DO   ipratropium-albuterol (DUO-NEB) 0.5-2.5 mg/3 ml nebulizer Take 3 mL by nebulization Every 4 (Four) Hours As Needed for Wheezing or Shortness of Air. 4/4/24   Betzaida Nelson APRN   levETIRAcetam (KEPPRA) 500 MG tablet Take 1 tablet by mouth 2 (Two) Times a Day. 2/25/24   Nguyễn Farnsworth DO   magnesium oxide (MAG-OX) 400 tablet tablet Take 1 tablet by mouth Daily. Started by [facility]    Breann Shukla MD   montelukast (SINGULAIR) 10 MG tablet Take 1 tablet by mouth every night at bedtime. 10/26/23   Kimmy Riley MD   Multiple Vitamins-Iron (multivitamin with iron) tablet tablet Take 1 tablet by mouth Daily.    ProviderBreann MD   nicotine (NICODERM CQ) 21 MG/24HR patch Place 1 patch on the skin as directed by provider Daily As Needed (smoking cessation). 2/25/24   Nguyễn Farnsworth DO   NIFEdipine XL (PROCARDIA XL) 30 MG 24 hr tablet Take 1 tablet by mouth Every Morning. 10/26/23   Kimmy Riley MD   O2 (OXYGEN) 2 Liter O2 - CONTINUOUS (route: Oxygen) 2/1/22   Breann Shukla MD   ondansetron (ZOFRAN) 4 MG tablet Take 1 tablet by mouth Every 8 (Eight) Hours As Needed for Nausea or Vomiting. 12/28/23   Breann Shukla MD   silver sulfadiazine (SILVADENE, SSD) 1 % cream Apply 1 Application topically to the appropriate area as directed 2 (Two) Times a Day. For his shin 12/29/23   Breann Shukla MD   Sodium Hypochlorite (DAKIN'S) 0.125 % topical solution Apply  topically to the appropriate area as directed Every 12 (Twelve) Hours. 2/25/24   Nguyễn Farnsworth DO   traZODone (DESYREL) 50 MG tablet Take 1 tablet by mouth Every Night. 2/25/24   Nguyễn Farnsworth DO   Ventolin  (90 Base) MCG/ACT inhaler Inhale 2 puffs Every 4 (Four) Hours As Needed for Wheezing or Shortness of Air for up to 30 days. 4/4/24 5/4/24  Betzaida Nelson, APRN  "  vitamin C (ASCORBIC ACID) 500 MG tablet Take 1 tablet by mouth 2 (Two) Times a Day. 24   ProviderBreann MD   Zinc 50 MG tablet Take 1 tablet by mouth Daily.    Provider, MD Breann        Social History:   Social History     Tobacco Use    Smoking status: Former     Current packs/day: 0.00     Average packs/day: 1 pack/day for 12.0 years (12.0 ttl pk-yrs)     Types: Cigarettes     Start date:      Quit date:      Years since quittin.3     Passive exposure: Past    Smokeless tobacco: Never   Vaping Use    Vaping status: Never Used   Substance Use Topics    Alcohol use: Not Currently    Drug use: Never         Review of Systems:  Review of Systems   Respiratory:  Positive for shortness of breath.         Physical Exam:  /51 (BP Location: Right arm, Patient Position: Lying)   Pulse 72   Temp 98.6 °F (37 °C) (Oral)   Resp 18   Ht 175.3 cm (69\")   Wt 124 kg (272 lb 11.3 oz)   SpO2 94%   BMI 40.27 kg/m²     Physical Exam  Vitals and nursing note reviewed.   Constitutional:       General: He is not in acute distress.  HENT:      Head: Normocephalic and atraumatic.   Eyes:      Extraocular Movements: Extraocular movements intact.   Cardiovascular:      Rate and Rhythm: Normal rate and regular rhythm.   Pulmonary:      Effort: Pulmonary effort is normal. No respiratory distress.      Breath sounds: Examination of the right-upper field reveals decreased breath sounds. Examination of the left-upper field reveals decreased breath sounds. Examination of the right-middle field reveals decreased breath sounds. Examination of the left-middle field reveals decreased breath sounds. Examination of the right-lower field reveals decreased breath sounds. Examination of the left-lower field reveals decreased breath sounds.   Abdominal:      General: Abdomen is flat.      Palpations: Abdomen is soft.      Tenderness: There is no abdominal tenderness.   Musculoskeletal:         General: Normal " range of motion.      Cervical back: Normal range of motion and neck supple.   Skin:     General: Skin is warm and dry.   Neurological:      Mental Status: He is alert and oriented to person, place, and time. Mental status is at baseline.                  Procedures:  Procedures      Medical Decision Making:      Comorbidities that affect care:    COPD    External Notes reviewed:    Previous Clinic Note: Patient seen 4/4/2024 in pulmonary office for follow-up      The following orders were placed and all results were independently analyzed by me:  Orders Placed This Encounter   Procedures    Blood Culture - Blood,    Blood Culture - Blood,    Respiratory Culture - Sputum, Cough    Respiratory Panel PCR w/COVID-19(SARS-CoV-2) OSMIN/ROBERTA/OCHOA/PAD/COR/LUIS In-House, NP Swab in UTM/VTM, 2 HR TAT - Swab, Nasopharynx    Legionella Antigen, Urine - Urine, Urine, Clean Catch    S. Pneumo Ag Urine or CSF - Urine, Urine, Clean Catch    AFB Culture - Body Fluid, Pleural Cavity    Body Fluid Culture - Body Fluid, Pleural Cavity    Anaerobic Culture - Pleural Fluid, Pleural Cavity    Fungus Culture - Body Fluid, Pleural Cavity    XR Chest 1 View    CT Chest Without Contrast Diagnostic    US Point Of Care    XR Chest 1 View    [place] Draw    Comprehensive Metabolic Panel    BNP    Single High Sensitivity Troponin T    CBC Auto Differential    Lactic Acid, Plasma    Blood Gas, Arterial -With Co-Ox Panel: Yes    High Sensitivity Troponin T 2Hr    Iron Profile    Reticulocytes    Ferritin    Procalcitonin    Basic Metabolic Panel    Magnesium    Phosphorus    CBC Auto Differential    Body Fluid Cell Count With Differential - Body Fluid, Pleural Cavity    pH, Body Fluid - Body Fluid, Pleural Cavity    Albumin, Fluid - Pleural Fluid, Pleural Cavity    Protein, Body Fluid - Pleural Fluid, Pleural Cavity    Lactate Dehydrogenase, Body Fluid - Body Fluid, Pleural Cavity    Glucose, Body Fluid - Pleural Fluid, Pleural Cavity    Triglycerides,  Body Fluid - Pleural Fluid, Pleural Cavity    Cholesterol, Body Fluid - Pleural Fluid, Pleural Cavity    Body fluid cell count - Body Fluid, Pleural Cavity    Body fluid differential - Body Fluid, Pleural Cavity    Diet: Cardiac, Fluid Restriction (240 mL/tray); Low Sodium (2g); 1500 mL/day; Fluid Consistency: Thin (IDDSI 0)    Undress & Gown    Continuous Pulse Oximetry    Vital Signs    Reason for COPD Admission: New Oxygen Requirements    Tobacco Cessation Education    Respiratory Treatment Education (MDI / Spacer / Nebulizer)    COPD Education    Cough / Deep Breathe    Vital Signs    Intake & Output    Weigh Patient    Oral Care    Saline Lock & Maintain IV Access    Activity - Ad Teri    Strict Intake & Output    Daily Weights    BID WET TO DRY DRESSING CHANGE FOR LLE UNTIL WOUND CARE EVALUATES PT.  Nursing Communication    Apply Heat To Affected Area    Bladder Scan    Strict Intake & Output    Daily Weights    Change Dressing    Elevate Heels Off of Bed    Turn Patient    Use Seat Cushion When Up In Chair    Head of Bed 30 Degrees or Less    Wound Care    Wound Care    Skin Care    Straight Cath    Obtain Informed Consent    Insert Indwelling Urinary Catheter    Assess Need for Indwelling Urinary Catheter - Follow Removal Protocol    Urinary Catheter Care    Code Status and Medical Interventions:    Inpatient Hospitalist Consult    Inpatient Case Management  Consult    Inpatient Nutrition Consult    Inpatient Podiatry Consult    Inpatient Pulmonology Consult    OT Consult: Eval & Treat ADL Performance Below Baseline    PT Consult: Eval & Treat Functional Mobility Below Baseline    Oxygen Therapy- Nasal Cannula; Titrate 1-6 LPM Per SpO2; 90 - 95%    NIPPV-IPPV / BIPAP / CPAP    Document Pulse Oximetry - On Room Air / Home O2 Level    Incentive Spirometry    Oxygen Therapy- Nasal Cannula; Titrate 1-6 LPM Per SpO2; 88 - 92%    Incentive Spirometry    Oscillating Positive Expiratory Pressure (OPEP)     RT to Initiate Bronchopulmonary Hygiene Protocol    Oscillating Positive Expiratory Pressure (OPEP)    POC Glucose Once    POC Glucose 4x Daily Before Meals & at Bedtime    POC Glucose Once    POC Glucose Once    POC Glucose Once    POC Glucose Once    POC Glucose Once    ECG 12 Lead ED Triage Standing Order; SOA    Wound Ostomy Eval & Treat    Insert Peripheral IV    Insert Peripheral IV    Inpatient Admission    CBC & Differential    Green Top (Gel)    Lavender Top    Gold Top - SST    Light Blue Top    CBC & Differential       Medications Given in the Emergency Department:  Medications   sodium chloride 0.9 % flush 10 mL (10 mL Intravenous Given 4/14/24 2143)   sodium chloride 0.9 % flush 10 mL (10 mL Intravenous Given 4/16/24 0808)   sodium chloride 0.9 % flush 10 mL (has no administration in time range)   sodium chloride 0.9 % infusion 40 mL (has no administration in time range)   sennosides-docusate (PERICOLACE) 8.6-50 MG per tablet 2 tablet (has no administration in time range)     And   polyethylene glycol (MIRALAX) packet 17 g (has no administration in time range)     And   bisacodyl (DULCOLAX) EC tablet 5 mg (has no administration in time range)     And   bisacodyl (DULCOLAX) suppository 10 mg (has no administration in time range)   ondansetron (ZOFRAN) injection 4 mg (has no administration in time range)   guaiFENesin (MUCINEX) 12 hr tablet 1,200 mg (1,200 mg Oral Given 4/16/24 0807)   guaiFENesin-dextromethorphan (ROBITUSSIN DM) 100-10 MG/5ML syrup 10 mL (10 mL Oral Given 4/15/24 0922)   apixaban (ELIQUIS) tablet 5 mg (5 mg Oral Given 4/16/24 1542)   methylPREDNISolone sodium succinate (SOLU-Medrol) injection 40 mg (40 mg Intravenous Given 4/16/24 0807)   aspirin EC tablet 81 mg (81 mg Oral Given 4/16/24 0808)   atorvastatin (LIPITOR) tablet 20 mg (20 mg Oral Given 4/15/24 2033)   busPIRone (BUSPAR) tablet 15 mg (has no administration in time range)   carvedilol (COREG) tablet 25 mg (25 mg Oral Given  4/16/24 0808)   DULoxetine (CYMBALTA) DR capsule 30 mg (30 mg Oral Given 4/16/24 0807)   folic acid (FOLVITE) tablet 1 mg (1 mg Oral Given 4/16/24 0808)   levETIRAcetam (KEPPRA) tablet 500 mg (500 mg Oral Given 4/16/24 0807)   montelukast (SINGULAIR) tablet 10 mg (10 mg Oral Given 4/15/24 2033)   NIFEdipine XL (PROCARDIA XL) 24 hr tablet 30 mg (30 mg Oral Given 4/16/24 0808)   ipratropium-albuterol (DUO-NEB) nebulizer solution 3 mL (3 mL Nebulization Given 4/16/24 1353)   dextrose (GLUTOSE) oral gel 15 g (has no administration in time range)   dextrose (D50W) (25 g/50 mL) IV injection 25 g (has no administration in time range)   glucagon (GLUCAGEN) injection 1 mg (has no administration in time range)   insulin detemir (LEVEMIR) injection 15 Units (15 Units Subcutaneous Given 4/15/24 2123)   Insulin Lispro (humaLOG) injection 3-14 Units (8 Units Subcutaneous Given 4/16/24 1701)   melatonin tablet 5 mg (5 mg Oral Given 4/15/24 2033)   aluminum-magnesium hydroxide-simethicone (MAALOX MAX) 400-400-40 MG/5ML suspension 15 mL (has no administration in time range)   nicotine (NICODERM CQ) 21 MG/24HR patch 1 patch (has no administration in time range)   hydrOXYzine (ATARAX) tablet 25 mg (has no administration in time range)   ondansetron (ZOFRAN) injection 4 mg (has no administration in time range)   acetaminophen (TYLENOL) tablet 650 mg (has no administration in time range)   Diclofenac Sodium (VOLTAREN) 1 % gel 2 g (has no administration in time range)   Lidocaine 4 % 1 patch (has no administration in time range)   prochlorperazine (COMPAZINE) injection 5 mg (has no administration in time range)   sodium chloride nasal spray 2 spray (has no administration in time range)   benzonatate (TESSALON) capsule 100 mg (has no administration in time range)   albuterol (PROVENTIL) nebulizer solution 0.083% 2.5 mg/3mL (has no administration in time range)   arformoterol (BROVANA) nebulizer solution 15 mcg (15 mcg Nebulization Given  4/16/24 0734)   budesonide (PULMICORT) nebulizer solution 0.5 mg (0.5 mg Nebulization Given 4/16/24 0734)   bumetanide (BUMEX) injection 2 mg (2 mg Intravenous Given 4/16/24 1542)   empagliflozin (JARDIANCE) tablet 10 mg (10 mg Oral Given 4/16/24 0808)   famotidine (PEPCID) tablet 40 mg (40 mg Oral Given 4/16/24 0808)   finasteride (PROSCAR) tablet 5 mg (5 mg Oral Given 4/16/24 0808)   magnesium oxide (MAG-OX) tablet 400 mg (400 mg Oral Given 4/16/24 0808)   traZODone (DESYREL) tablet 50 mg (50 mg Oral Given 4/15/24 2033)   doxycycline (MONODOX) capsule 100 mg (100 mg Oral Given 4/16/24 0808)   ferrous sulfate tablet 325 mg (325 mg Oral Given 4/16/24 1204)   Pharmacy to Dose Cefepime (has no administration in time range)   cefepime (MAXIPIME) 2000 mg/100 mL 0.9% NS (mbp) (2,000 mg Intravenous New Bag 4/16/24 1702)   ipratropium-albuterol (DUO-NEB) nebulizer solution 3 mL (3 mL Nebulization Given 4/14/24 2025)   furosemide (LASIX) injection 40 mg (40 mg Intravenous Given 4/14/24 2143)   cefepime (MAXIPIME) 2000 mg/100 mL 0.9% NS (mbp) (2,000 mg Intravenous New Bag 4/16/24 0049)   metOLazone (ZAROXOLYN) tablet 5 mg (5 mg Oral Given 4/16/24 0933)        ED Course:         Labs:    Lab Results (last 24 hours)       Procedure Component Value Units Date/Time    POC Glucose Once [[id number]]  (Abnormal) Collected: 04/15/24 2116    Specimen: Blood Updated: 04/15/24 2119     Glucose 194 mg/dL      Comment: Serial Number: [id number]:  [id number]       Basic Metabolic Panel [[id number]]  (Abnormal) Collected: 04/16/24 0353    Specimen: Blood Updated: 04/16/24 0440     Glucose 224 mg/dL      BUN 29 mg/dL      Creatinine 1.72 mg/dL      Sodium 137 mmol/L      Potassium 4.8 mmol/L      Chloride 94 mmol/L      CO2 36.3 mmol/L      Calcium 8.2 mg/dL      BUN/Creatinine Ratio 16.9     Anion Gap 6.7 mmol/L      eGFR 45.5 mL/min/1.73     Narrative:      GFR Normal >60  Chronic Kidney Disease <60  Kidney Failure <15      CBC &  Differential [[id number]]  (Abnormal) Collected: 04/16/24 0353    Specimen: Blood Updated: 04/16/24 0422    Narrative:      The following orders were created for panel order CBC & Differential.  Procedure                               Abnormality         Status                     ---------                               -----------         ------                     CBC Auto Differential[[id number]]        Abnormal            Final result                 Please view results for these tests on the individual orders.    Magnesium [[id number]]  (Normal) Collected: 04/16/24 0353    Specimen: Blood Updated: 04/16/24 0440     Magnesium 2.4 mg/dL     Phosphorus [[id number]]  (Normal) Collected: 04/16/24 0353    Specimen: Blood Updated: 04/16/24 0440     Phosphorus 4.0 mg/dL     CBC Auto Differential [[id number]]  (Abnormal) Collected: 04/16/24 0353    Specimen: Blood Updated: 04/16/24 0422     WBC 6.52 10*3/mm3      RBC 2.88 10*6/mm3      Hemoglobin 7.9 g/dL      Hematocrit 26.5 %      MCV 92.0 fL      MCH 27.4 pg      MCHC 29.8 g/dL      RDW 15.8 %      RDW-SD 52.7 fl      MPV 9.0 fL      Platelets 334 10*3/mm3      Neutrophil % 79.1 %      Lymphocyte % 13.5 %      Monocyte % 6.0 %      Eosinophil % 0.0 %      Basophil % 0.2 %      Immature Grans % 1.2 %      Neutrophils, Absolute 5.16 10*3/mm3      Lymphocytes, Absolute 0.88 10*3/mm3      Monocytes, Absolute 0.39 10*3/mm3      Eosinophils, Absolute 0.00 10*3/mm3      Basophils, Absolute 0.01 10*3/mm3      Immature Grans, Absolute 0.08 10*3/mm3      nRBC 0.0 /100 WBC     POC Glucose 4x Daily Before Meals & at Bedtime [[id number]]  (Abnormal) Collected: 04/16/24 0719    Specimen: Blood Updated: 04/16/24 0721     Glucose 166 mg/dL      Comment: Serial Number: [id number]:  [id number]       Respiratory Culture - Sputum, Cough [[id number]] Collected: 04/16/24 0912    Specimen: Sputum from Cough Updated: 04/16/24 1040     Gram Stain Moderate (3+) WBCs per low power field       Rare (1+) Epithelial cells per low power field      Few (2+) Gram positive cocci in pairs and chains      Moderate (3+) Gram negative bacilli    POC Glucose 4x Daily Before Meals & at Bedtime [[id number]]  (Abnormal) Collected: 04/16/24 1100    Specimen: Blood Updated: 04/16/24 1102     Glucose 212 mg/dL      Comment: Serial Number: [id number]:  [id number]       Body Fluid Cell Count With Differential - Body Fluid, Pleural Cavity [[id number]]  (Abnormal) Collected: 04/16/24 1235    Specimen: Body Fluid from Pleural Cavity Updated: 04/16/24 1503    Narrative:      The following orders were created for panel order Body Fluid Cell Count With Differential - Body Fluid, Pleural Cavity.  Procedure                               Abnormality         Status                     ---------                               -----------         ------                     Body fluid cell count - ...[[id number]]  Abnormal            Final result               Body fluid differential ...[[id number]]                      Final result                 Please view results for these tests on the individual orders.    AFB Culture - Body Fluid, Pleural Cavity [[id number]] Collected: 04/16/24 1235    Specimen: Body Fluid from Pleural Cavity Updated: 04/16/24 1536     AFB Stain No acid fast bacilli seen    Body Fluid Culture - Body Fluid, Pleural Cavity [[id number]] Collected: 04/16/24 1235    Specimen: Body Fluid from Pleural Cavity Updated: 04/16/24 1456     Gram Stain Moderate (3+) WBCs seen      No organisms seen    Fungus Culture - Body Fluid, Pleural Cavity [[id number]] Collected: 04/16/24 1235    Specimen: Body Fluid from Pleural Cavity Updated: 04/16/24 1249    Body fluid cell count - Body Fluid, Pleural Cavity [[id number]]  (Abnormal) Collected: 04/16/24 1235    Specimen: Body Fluid from Pleural Cavity Updated: 04/16/24 1337     Color, Fluid Orange     Appearance, Fluid Cloudy     Nucleated Cells, Fluid 503 /mm3      RBC, Fluid 32,000 /mm3      Narrative:      No reference range established. Physician to interpret results with clinical findings.    Body fluid differential - Body Fluid, Pleural Cavity [[id number]] Collected: 04/16/24 1235    Specimen: Body Fluid from Pleural Cavity Updated: 04/16/24 1503     Neutrophils, Fluid 3 %      Lymphocytes, Fluid 88 %      Monocytes, Fluid 8 %      Macrophage, Fluid 1 %     pH, Body Fluid - Body Fluid, Pleural Cavity [[id number]] Collected: 04/16/24 1236    Specimen: Body Fluid from Pleural Cavity Updated: 04/16/24 1301     pH, Fluid 8.00    Albumin, Fluid - Pleural Fluid, Pleural Cavity [[id number]] Collected: 04/16/24 1236    Specimen: Pleural Fluid from Pleural Cavity Updated: 04/16/24 1249    Protein, Body Fluid - Pleural Fluid, Pleural Cavity [[id number]] Collected: 04/16/24 1236    Specimen: Pleural Fluid from Pleural Cavity Updated: 04/16/24 1249    Lactate Dehydrogenase, Body Fluid - Pleural Fluid, Pleural Cavity [[id number]] Collected: 04/16/24 1236    Specimen: Pleural Fluid from Pleural Cavity Updated: 04/16/24 1702    Glucose, Body Fluid - Pleural Fluid, Pleural Cavity [[id number]] Collected: 04/16/24 1236    Specimen: Pleural Fluid from Pleural Cavity Updated: 04/16/24 1249    Triglycerides, Body Fluid - Pleural Fluid, Pleural Cavity [[id number]] Collected: 04/16/24 1236    Specimen: Pleural Fluid from Pleural Cavity Updated: 04/16/24 1249    Cholesterol, Body Fluid - Pleural Fluid, Pleural Cavity [[id number]] Collected: 04/16/24 1236    Specimen: Pleural Fluid from Pleural Cavity Updated: 04/16/24 1249    Anaerobic Culture - Pleural Fluid, Pleural Cavity [[id number]] Collected: 04/16/24 1236    Specimen: Pleural Fluid from Pleural Cavity Updated: 04/16/24 1249    POC Glucose Once [[id number]]  (Abnormal) Collected: 04/16/24 1651    Specimen: Blood Updated: 04/16/24 1657     Glucose 264 mg/dL      Comment: Serial Number: [id number]:  [id number]                Imaging:    XR Chest 1 View    Result Date:  4/16/2024  XR CHEST 1 VW-  Date of Exam: 4/16/2024 2:24 PM  Indication: s/p right Thoracentesis; J96.02-Acute respiratory failure with hypercapnia; Z78.9-Other specified health status  Comparison: 4/14/2024  Findings: Patient rotated to the right. Right subclavian port and left subclavian central venous line noted. No pneumothorax status post right thoracentesis. No significant change in multifocal airspace disease      Impression:  1. No pneumothorax status post thoracentesis 2. Stable multifocal airspace disease   Electronically Signed By-Derrick Dana On:4/16/2024 3:30 PM         Differential Diagnosis and Discussion:    Dyspnea: Differential diagnosis includes but is not limited to metabolic acidosis, neurological disorders, psychogenic, asthma, pneumothorax, upper airway obstruction, COPD, pneumonia, noncardiogenic pulmonary edema, interstitial lung disease, anemia, congestive heart failure, and pulmonary embolism    All labs were reviewed and interpreted by me.  All X-rays impressions were independently interpreted by me.  EKG was interpreted by me.    [facility]       Critical Care Note: Total Critical Care time of 40 minutes. Total critical care time documented does not include time spent on separately billed procedures for services of nurses or physician assistants. I personally saw and examined the patient. I have reviewed all diagnostic interpretations and treatment plans as written. I was present for the key portions of any procedures performed and the inclusive time noted in any critical care statement. Critical care time includes patient management by me, time spent at the patients bedside,  time to review lab and imaging results, discussing patient care, documentation in the medical record, and time spent with family or caregiver.        Patient Care Considerations:          Consultants/Shared Management Plan:    Hospitalist: I have discussed the case with Dr. So who agrees to accept the patient for  admission.    Social Determinants of Health:    Patient is independent, reliable, and has access to care.       Disposition and Care Coordination:    Admit:   Through independent evaluation of the patient's history, physical, and imperical data, the patient meets criteria for inpatient admission to the hospital.        Final diagnoses:   Acute respiratory failure with hypercapnia        ED Disposition       ED Disposition   Decision to Admit    Condition   --    Comment   Level of Care: Telemetry [5]   Diagnosis: Acute on chronic respiratory failure with hypercapnia [3523588]   Admitting Physician: QUINTON BROCK [[id number]]   Certification: I Certify That Inpatient Hospital Services Are Medically Necessary For Greater Than 2 Midnights                 This medical record created using voice recognition software.             Ari Murrieta DO  04/16/24 1320

## 2024-04-15 ENCOUNTER — APPOINTMENT (OUTPATIENT)
Dept: CT IMAGING | Facility: HOSPITAL | Age: 59
DRG: 280 | End: 2024-04-15
Payer: COMMERCIAL

## 2024-04-15 PROBLEM — J96.12 CHRONIC RESPIRATORY FAILURE WITH HYPOXIA AND HYPERCAPNIA: Status: ACTIVE | Noted: 2024-04-15

## 2024-04-15 PROBLEM — J96.11 CHRONIC RESPIRATORY FAILURE WITH HYPOXIA AND HYPERCAPNIA: Status: ACTIVE | Noted: 2024-04-15

## 2024-04-15 LAB
ANION GAP SERPL CALCULATED.3IONS-SCNC: 7.3 MMOL/L (ref 5–15)
B PARAPERT DNA SPEC QL NAA+PROBE: NOT DETECTED
B PERT DNA SPEC QL NAA+PROBE: NOT DETECTED
BUN SERPL-MCNC: 23 MG/DL (ref 6–20)
BUN/CREAT SERPL: 12.1 (ref 7–25)
C PNEUM DNA NPH QL NAA+NON-PROBE: NOT DETECTED
CALCIUM SPEC-SCNC: 8.5 MG/DL (ref 8.6–10.5)
CHLORIDE SERPL-SCNC: 92 MMOL/L (ref 98–107)
CO2 SERPL-SCNC: 36.7 MMOL/L (ref 22–29)
CREAT SERPL-MCNC: 1.9 MG/DL (ref 0.76–1.27)
DEPRECATED RDW RBC AUTO: 53.5 FL (ref 37–54)
EGFRCR SERPLBLD CKD-EPI 2021: 40.4 ML/MIN/1.73
ERYTHROCYTE [DISTWIDTH] IN BLOOD BY AUTOMATED COUNT: 15.9 % (ref 12.3–15.4)
FERRITIN SERPL-MCNC: 117.9 NG/ML (ref 30–400)
FLUAV SUBTYP SPEC NAA+PROBE: NOT DETECTED
FLUBV RNA ISLT QL NAA+PROBE: NOT DETECTED
GLUCOSE BLDC GLUCOMTR-MCNC: 140 MG/DL (ref 70–99)
GLUCOSE BLDC GLUCOMTR-MCNC: 144 MG/DL (ref 70–99)
GLUCOSE BLDC GLUCOMTR-MCNC: 166 MG/DL (ref 70–99)
GLUCOSE BLDC GLUCOMTR-MCNC: 178 MG/DL (ref 70–99)
GLUCOSE BLDC GLUCOMTR-MCNC: 194 MG/DL (ref 70–99)
GLUCOSE SERPL-MCNC: 158 MG/DL (ref 65–99)
HADV DNA SPEC NAA+PROBE: NOT DETECTED
HCOV 229E RNA SPEC QL NAA+PROBE: NOT DETECTED
HCOV HKU1 RNA SPEC QL NAA+PROBE: NOT DETECTED
HCOV NL63 RNA SPEC QL NAA+PROBE: NOT DETECTED
HCOV OC43 RNA SPEC QL NAA+PROBE: NOT DETECTED
HCT VFR BLD AUTO: 25.7 % (ref 37.5–51)
HGB BLD-MCNC: 7.5 G/DL (ref 13–17.7)
HMPV RNA NPH QL NAA+NON-PROBE: NOT DETECTED
HPIV1 RNA ISLT QL NAA+PROBE: NOT DETECTED
HPIV2 RNA SPEC QL NAA+PROBE: NOT DETECTED
HPIV3 RNA NPH QL NAA+PROBE: NOT DETECTED
HPIV4 P GENE NPH QL NAA+PROBE: NOT DETECTED
IRON 24H UR-MRATE: 46 MCG/DL (ref 59–158)
IRON SATN MFR SERPL: 17 % (ref 20–50)
L PNEUMO1 AG UR QL IA: NEGATIVE
M PNEUMO IGG SER IA-ACNC: NOT DETECTED
MCH RBC QN AUTO: 27.3 PG (ref 26.6–33)
MCHC RBC AUTO-ENTMCNC: 29.2 G/DL (ref 31.5–35.7)
MCV RBC AUTO: 93.5 FL (ref 79–97)
PLATELET # BLD AUTO: 327 10*3/MM3 (ref 140–450)
PMV BLD AUTO: 8.8 FL (ref 6–12)
POTASSIUM SERPL-SCNC: 4.4 MMOL/L (ref 3.5–5.2)
PROCALCITONIN SERPL-MCNC: 0.11 NG/ML (ref 0–0.25)
RBC # BLD AUTO: 2.75 10*6/MM3 (ref 4.14–5.8)
RETICS # AUTO: 0.08 10*6/MM3 (ref 0.02–0.13)
RETICS/RBC NFR AUTO: 2.92 % (ref 0.7–1.9)
RHINOVIRUS RNA SPEC NAA+PROBE: NOT DETECTED
RSV RNA NPH QL NAA+NON-PROBE: NOT DETECTED
S PNEUM AG SPEC QL LA: NEGATIVE
SARS-COV-2 RNA RESP QL NAA+PROBE: NOT DETECTED
SODIUM SERPL-SCNC: 136 MMOL/L (ref 136–145)
TIBC SERPL-MCNC: 271 MCG/DL (ref 298–536)
TRANSFERRIN SERPL-MCNC: 182 MG/DL (ref 200–360)
WBC NRBC COR # BLD AUTO: 8 10*3/MM3 (ref 3.4–10.8)

## 2024-04-15 PROCEDURE — 94799 UNLISTED PULMONARY SVC/PX: CPT

## 2024-04-15 PROCEDURE — 63710000001 INSULIN LISPRO (HUMAN) PER 5 UNITS: Performed by: FAMILY MEDICINE

## 2024-04-15 PROCEDURE — 94660 CPAP INITIATION&MGMT: CPT

## 2024-04-15 PROCEDURE — 25010000002 BUMETANIDE PER 0.5 MG: Performed by: INTERNAL MEDICINE

## 2024-04-15 PROCEDURE — 82728 ASSAY OF FERRITIN: CPT | Performed by: INTERNAL MEDICINE

## 2024-04-15 PROCEDURE — 82948 REAGENT STRIP/BLOOD GLUCOSE: CPT

## 2024-04-15 PROCEDURE — 0202U NFCT DS 22 TRGT SARS-COV-2: CPT

## 2024-04-15 PROCEDURE — 80048 BASIC METABOLIC PNL TOTAL CA: CPT | Performed by: FAMILY MEDICINE

## 2024-04-15 PROCEDURE — 25010000002 BUMETANIDE PER 0.5 MG: Performed by: FAMILY MEDICINE

## 2024-04-15 PROCEDURE — 71250 CT THORAX DX C-: CPT

## 2024-04-15 PROCEDURE — 99221 1ST HOSP IP/OBS SF/LOW 40: CPT | Performed by: PODIATRIST

## 2024-04-15 PROCEDURE — 85045 AUTOMATED RETICULOCYTE COUNT: CPT | Performed by: FAMILY MEDICINE

## 2024-04-15 PROCEDURE — 85027 COMPLETE CBC AUTOMATED: CPT | Performed by: FAMILY MEDICINE

## 2024-04-15 PROCEDURE — 99223 1ST HOSP IP/OBS HIGH 75: CPT | Performed by: INTERNAL MEDICINE

## 2024-04-15 PROCEDURE — 99223 1ST HOSP IP/OBS HIGH 75: CPT | Performed by: FAMILY MEDICINE

## 2024-04-15 PROCEDURE — 25010000002 METHYLPREDNISOLONE PER 40 MG: Performed by: FAMILY MEDICINE

## 2024-04-15 PROCEDURE — 94664 DEMO&/EVAL PT USE INHALER: CPT

## 2024-04-15 PROCEDURE — 25010000002 CEFTRIAXONE PER 250 MG: Performed by: INTERNAL MEDICINE

## 2024-04-15 PROCEDURE — 63710000001 INSULIN DETEMIR PER 5 UNITS: Performed by: FAMILY MEDICINE

## 2024-04-15 PROCEDURE — 99291 CRITICAL CARE FIRST HOUR: CPT | Performed by: INTERNAL MEDICINE

## 2024-04-15 PROCEDURE — 94761 N-INVAS EAR/PLS OXIMETRY MLT: CPT

## 2024-04-15 PROCEDURE — 84145 PROCALCITONIN (PCT): CPT | Performed by: INTERNAL MEDICINE

## 2024-04-15 PROCEDURE — 87449 NOS EACH ORGANISM AG IA: CPT

## 2024-04-15 RX ORDER — NICOTINE POLACRILEX 4 MG
15 LOZENGE BUCCAL
Status: DISCONTINUED | OUTPATIENT
Start: 2024-04-15 | End: 2024-04-19 | Stop reason: HOSPADM

## 2024-04-15 RX ORDER — LEVETIRACETAM 500 MG/1
500 TABLET ORAL 2 TIMES DAILY
Status: DISCONTINUED | OUTPATIENT
Start: 2024-04-15 | End: 2024-04-19 | Stop reason: HOSPADM

## 2024-04-15 RX ORDER — ONDANSETRON 2 MG/ML
4 INJECTION INTRAMUSCULAR; INTRAVENOUS EVERY 6 HOURS PRN
Status: DISCONTINUED | OUTPATIENT
Start: 2024-04-15 | End: 2024-04-19 | Stop reason: HOSPADM

## 2024-04-15 RX ORDER — DOCUSATE SODIUM 100 MG/1
100 CAPSULE, LIQUID FILLED ORAL 2 TIMES DAILY PRN
Status: ON HOLD | COMMUNITY

## 2024-04-15 RX ORDER — HYDROXYZINE HYDROCHLORIDE 25 MG/1
25 TABLET, FILM COATED ORAL 3 TIMES DAILY PRN
Status: DISCONTINUED | OUTPATIENT
Start: 2024-04-15 | End: 2024-04-19 | Stop reason: HOSPADM

## 2024-04-15 RX ORDER — FUROSEMIDE 10 MG/ML
40 INJECTION INTRAMUSCULAR; INTRAVENOUS EVERY 12 HOURS
Status: DISCONTINUED | OUTPATIENT
Start: 2024-04-15 | End: 2024-04-15

## 2024-04-15 RX ORDER — FAMOTIDINE 40 MG/1
40 TABLET, FILM COATED ORAL DAILY
Status: ON HOLD | COMMUNITY

## 2024-04-15 RX ORDER — IBUPROFEN 600 MG/1
1 TABLET ORAL
Status: DISCONTINUED | OUTPATIENT
Start: 2024-04-15 | End: 2024-04-19 | Stop reason: HOSPADM

## 2024-04-15 RX ORDER — INSULIN LISPRO 100 [IU]/ML
8 INJECTION, SOLUTION INTRAVENOUS; SUBCUTANEOUS
Status: ON HOLD | COMMUNITY

## 2024-04-15 RX ORDER — BISACODYL 10 MG
10 SUPPOSITORY, RECTAL RECTAL DAILY PRN
Status: DISCONTINUED | OUTPATIENT
Start: 2024-04-15 | End: 2024-04-19 | Stop reason: HOSPADM

## 2024-04-15 RX ORDER — SODIUM CHLORIDE 0.9 % (FLUSH) 0.9 %
10 SYRINGE (ML) INJECTION EVERY 12 HOURS SCHEDULED
Status: DISCONTINUED | OUTPATIENT
Start: 2024-04-15 | End: 2024-04-19 | Stop reason: HOSPADM

## 2024-04-15 RX ORDER — FERROUS SULFATE 325(65) MG
325 TABLET ORAL
Status: DISCONTINUED | OUTPATIENT
Start: 2024-04-16 | End: 2024-04-19 | Stop reason: HOSPADM

## 2024-04-15 RX ORDER — INSULIN LISPRO 100 [IU]/ML
3-14 INJECTION, SOLUTION INTRAVENOUS; SUBCUTANEOUS
Status: DISCONTINUED | OUTPATIENT
Start: 2024-04-15 | End: 2024-04-19 | Stop reason: HOSPADM

## 2024-04-15 RX ORDER — BUSPIRONE HYDROCHLORIDE 15 MG/1
15 TABLET ORAL 2 TIMES DAILY PRN
Status: DISCONTINUED | OUTPATIENT
Start: 2024-04-15 | End: 2024-04-19 | Stop reason: HOSPADM

## 2024-04-15 RX ORDER — DOXYCYCLINE 100 MG/1
100 CAPSULE ORAL EVERY 12 HOURS SCHEDULED
Status: DISCONTINUED | OUTPATIENT
Start: 2024-04-15 | End: 2024-04-19 | Stop reason: HOSPADM

## 2024-04-15 RX ORDER — SODIUM CHLORIDE 0.9 % (FLUSH) 0.9 %
10 SYRINGE (ML) INJECTION AS NEEDED
Status: DISCONTINUED | OUTPATIENT
Start: 2024-04-15 | End: 2024-04-19 | Stop reason: HOSPADM

## 2024-04-15 RX ORDER — BISACODYL 5 MG/1
5 TABLET, DELAYED RELEASE ORAL DAILY PRN
Status: DISCONTINUED | OUTPATIENT
Start: 2024-04-15 | End: 2024-04-19 | Stop reason: HOSPADM

## 2024-04-15 RX ORDER — TRAZODONE HYDROCHLORIDE 50 MG/1
50 TABLET ORAL NIGHTLY
Status: DISCONTINUED | OUTPATIENT
Start: 2024-04-15 | End: 2024-04-19 | Stop reason: HOSPADM

## 2024-04-15 RX ORDER — BUMETANIDE 0.25 MG/ML
2 INJECTION INTRAMUSCULAR; INTRAVENOUS 3 TIMES DAILY
Status: DISCONTINUED | OUTPATIENT
Start: 2024-04-15 | End: 2024-04-19

## 2024-04-15 RX ORDER — IPRATROPIUM BROMIDE AND ALBUTEROL SULFATE 2.5; .5 MG/3ML; MG/3ML
3 SOLUTION RESPIRATORY (INHALATION)
Status: DISCONTINUED | OUTPATIENT
Start: 2024-04-15 | End: 2024-04-19 | Stop reason: HOSPADM

## 2024-04-15 RX ORDER — BUMETANIDE 0.25 MG/ML
2 INJECTION INTRAMUSCULAR; INTRAVENOUS EVERY 12 HOURS
Status: DISCONTINUED | OUTPATIENT
Start: 2024-04-15 | End: 2024-04-15

## 2024-04-15 RX ORDER — SODIUM CHLORIDE 9 MG/ML
40 INJECTION, SOLUTION INTRAVENOUS AS NEEDED
Status: DISCONTINUED | OUTPATIENT
Start: 2024-04-15 | End: 2024-04-19 | Stop reason: HOSPADM

## 2024-04-15 RX ORDER — ONDANSETRON 2 MG/ML
4 INJECTION INTRAMUSCULAR; INTRAVENOUS EVERY 4 HOURS PRN
Status: DISCONTINUED | OUTPATIENT
Start: 2024-04-15 | End: 2024-04-19 | Stop reason: HOSPADM

## 2024-04-15 RX ORDER — NIFEDIPINE 30 MG/1
30 TABLET, EXTENDED RELEASE ORAL EVERY MORNING
Status: DISCONTINUED | OUTPATIENT
Start: 2024-04-15 | End: 2024-04-19 | Stop reason: HOSPADM

## 2024-04-15 RX ORDER — BENZONATATE 100 MG/1
100 CAPSULE ORAL 3 TIMES DAILY PRN
Status: DISCONTINUED | OUTPATIENT
Start: 2024-04-15 | End: 2024-04-19 | Stop reason: HOSPADM

## 2024-04-15 RX ORDER — PROCHLORPERAZINE EDISYLATE 5 MG/ML
5 INJECTION INTRAMUSCULAR; INTRAVENOUS EVERY 6 HOURS PRN
Status: DISCONTINUED | OUTPATIENT
Start: 2024-04-15 | End: 2024-04-19 | Stop reason: HOSPADM

## 2024-04-15 RX ORDER — ATORVASTATIN CALCIUM 20 MG/1
20 TABLET, FILM COATED ORAL NIGHTLY
Status: DISCONTINUED | OUTPATIENT
Start: 2024-04-15 | End: 2024-04-19 | Stop reason: HOSPADM

## 2024-04-15 RX ORDER — FOLIC ACID 1 MG/1
1 TABLET ORAL DAILY
Status: DISCONTINUED | OUTPATIENT
Start: 2024-04-15 | End: 2024-04-19 | Stop reason: HOSPADM

## 2024-04-15 RX ORDER — ACETAMINOPHEN 325 MG/1
650 TABLET ORAL EVERY 6 HOURS PRN
Status: DISCONTINUED | OUTPATIENT
Start: 2024-04-15 | End: 2024-04-19 | Stop reason: HOSPADM

## 2024-04-15 RX ORDER — FERROUS GLUCONATE 324(38)MG
324 TABLET ORAL
Status: ON HOLD | COMMUNITY

## 2024-04-15 RX ORDER — DULOXETIN HYDROCHLORIDE 30 MG/1
30 CAPSULE, DELAYED RELEASE ORAL DAILY
Status: DISCONTINUED | OUTPATIENT
Start: 2024-04-15 | End: 2024-04-19 | Stop reason: HOSPADM

## 2024-04-15 RX ORDER — DEXTROSE MONOHYDRATE 25 G/50ML
25 INJECTION, SOLUTION INTRAVENOUS
Status: DISCONTINUED | OUTPATIENT
Start: 2024-04-15 | End: 2024-04-19 | Stop reason: HOSPADM

## 2024-04-15 RX ORDER — MONTELUKAST SODIUM 10 MG/1
10 TABLET ORAL NIGHTLY
Status: DISCONTINUED | OUTPATIENT
Start: 2024-04-15 | End: 2024-04-19 | Stop reason: HOSPADM

## 2024-04-15 RX ORDER — ALBUTEROL SULFATE 2.5 MG/3ML
2.5 SOLUTION RESPIRATORY (INHALATION) EVERY 4 HOURS PRN
Status: DISCONTINUED | OUTPATIENT
Start: 2024-04-15 | End: 2024-04-19 | Stop reason: HOSPADM

## 2024-04-15 RX ORDER — IPRATROPIUM BROMIDE AND ALBUTEROL SULFATE 2.5; .5 MG/3ML; MG/3ML
3 SOLUTION RESPIRATORY (INHALATION) EVERY 4 HOURS PRN
Status: DISCONTINUED | OUTPATIENT
Start: 2024-04-15 | End: 2024-04-15 | Stop reason: SDUPTHER

## 2024-04-15 RX ORDER — NICOTINE 21 MG/24HR
1 PATCH, TRANSDERMAL 24 HOURS TRANSDERMAL DAILY PRN
Status: DISCONTINUED | OUTPATIENT
Start: 2024-04-15 | End: 2024-04-19 | Stop reason: HOSPADM

## 2024-04-15 RX ORDER — ASPIRIN 81 MG/1
81 TABLET ORAL DAILY
Status: DISCONTINUED | OUTPATIENT
Start: 2024-04-15 | End: 2024-04-19 | Stop reason: HOSPADM

## 2024-04-15 RX ORDER — CHOLECALCIFEROL (VITAMIN D3) 125 MCG
5 CAPSULE ORAL NIGHTLY PRN
Status: DISCONTINUED | OUTPATIENT
Start: 2024-04-15 | End: 2024-04-19 | Stop reason: HOSPADM

## 2024-04-15 RX ORDER — POLYETHYLENE GLYCOL 3350 17 G/17G
17 POWDER, FOR SOLUTION ORAL DAILY PRN
Status: DISCONTINUED | OUTPATIENT
Start: 2024-04-15 | End: 2024-04-19 | Stop reason: HOSPADM

## 2024-04-15 RX ORDER — FINASTERIDE 5 MG/1
5 TABLET, FILM COATED ORAL DAILY
Status: DISCONTINUED | OUTPATIENT
Start: 2024-04-15 | End: 2024-04-19 | Stop reason: HOSPADM

## 2024-04-15 RX ORDER — AMOXICILLIN 250 MG
2 CAPSULE ORAL 2 TIMES DAILY PRN
Status: DISCONTINUED | OUTPATIENT
Start: 2024-04-15 | End: 2024-04-19 | Stop reason: HOSPADM

## 2024-04-15 RX ORDER — GUAIFENESIN 600 MG/1
1200 TABLET, EXTENDED RELEASE ORAL EVERY 12 HOURS SCHEDULED
Status: DISCONTINUED | OUTPATIENT
Start: 2024-04-15 | End: 2024-04-19 | Stop reason: HOSPADM

## 2024-04-15 RX ORDER — BUDESONIDE 0.5 MG/2ML
0.5 INHALANT ORAL
Status: DISCONTINUED | OUTPATIENT
Start: 2024-04-15 | End: 2024-04-19 | Stop reason: HOSPADM

## 2024-04-15 RX ORDER — METHYLPREDNISOLONE SODIUM SUCCINATE 40 MG/ML
40 INJECTION, POWDER, LYOPHILIZED, FOR SOLUTION INTRAMUSCULAR; INTRAVENOUS DAILY
Status: DISCONTINUED | OUTPATIENT
Start: 2024-04-15 | End: 2024-04-18

## 2024-04-15 RX ORDER — ARFORMOTEROL TARTRATE 15 UG/2ML
15 SOLUTION RESPIRATORY (INHALATION)
Status: DISCONTINUED | OUTPATIENT
Start: 2024-04-15 | End: 2024-04-19 | Stop reason: HOSPADM

## 2024-04-15 RX ORDER — FERROUS SULFATE 325(65) MG
325 TABLET ORAL
Status: DISCONTINUED | OUTPATIENT
Start: 2024-04-15 | End: 2024-04-15

## 2024-04-15 RX ORDER — GUAIFENESIN/DEXTROMETHORPHAN 100-10MG/5
10 SYRUP ORAL EVERY 6 HOURS PRN
Status: DISCONTINUED | OUTPATIENT
Start: 2024-04-15 | End: 2024-04-19 | Stop reason: HOSPADM

## 2024-04-15 RX ORDER — LIDOCAINE 4 G/G
1 PATCH TOPICAL DAILY PRN
Status: DISCONTINUED | OUTPATIENT
Start: 2024-04-15 | End: 2024-04-19 | Stop reason: HOSPADM

## 2024-04-15 RX ORDER — MONTELUKAST SODIUM 10 MG/1
10 TABLET ORAL NIGHTLY
Status: ON HOLD | COMMUNITY

## 2024-04-15 RX ORDER — ECHINACEA PURPUREA EXTRACT 125 MG
2 TABLET ORAL AS NEEDED
Status: DISCONTINUED | OUTPATIENT
Start: 2024-04-15 | End: 2024-04-19 | Stop reason: HOSPADM

## 2024-04-15 RX ORDER — CARVEDILOL 25 MG/1
25 TABLET ORAL EVERY 12 HOURS SCHEDULED
Status: DISCONTINUED | OUTPATIENT
Start: 2024-04-15 | End: 2024-04-19 | Stop reason: HOSPADM

## 2024-04-15 RX ORDER — ALUMINA, MAGNESIA, AND SIMETHICONE 2400; 2400; 240 MG/30ML; MG/30ML; MG/30ML
15 SUSPENSION ORAL EVERY 6 HOURS PRN
Status: DISCONTINUED | OUTPATIENT
Start: 2024-04-15 | End: 2024-04-19 | Stop reason: HOSPADM

## 2024-04-15 RX ORDER — FAMOTIDINE 20 MG/1
40 TABLET, FILM COATED ORAL EVERY MORNING
Status: DISCONTINUED | OUTPATIENT
Start: 2024-04-15 | End: 2024-04-19 | Stop reason: HOSPADM

## 2024-04-15 RX ADMIN — ATORVASTATIN CALCIUM 20 MG: 20 TABLET, FILM COATED ORAL at 20:33

## 2024-04-15 RX ADMIN — CEFTRIAXONE SODIUM 2000 MG: 2 INJECTION, POWDER, FOR SOLUTION INTRAMUSCULAR; INTRAVENOUS at 20:32

## 2024-04-15 RX ADMIN — INSULIN DETEMIR 15 UNITS: 100 INJECTION, SOLUTION SUBCUTANEOUS at 21:23

## 2024-04-15 RX ADMIN — MONTELUKAST 10 MG: 10 TABLET, FILM COATED ORAL at 20:33

## 2024-04-15 RX ADMIN — LEVETIRACETAM 500 MG: 500 TABLET, FILM COATED ORAL at 09:22

## 2024-04-15 RX ADMIN — ARFORMOTEROL TARTRATE 15 MCG: 15 SOLUTION RESPIRATORY (INHALATION) at 20:24

## 2024-04-15 RX ADMIN — GUAIFENESIN 1200 MG: 600 TABLET ORAL at 20:33

## 2024-04-15 RX ADMIN — ASPIRIN 81 MG: 81 TABLET, COATED ORAL at 09:21

## 2024-04-15 RX ADMIN — IPRATROPIUM BROMIDE AND ALBUTEROL SULFATE 3 ML: .5; 3 SOLUTION RESPIRATORY (INHALATION) at 20:24

## 2024-04-15 RX ADMIN — GUAIFENESIN AND DEXTROMETHORPHAN 10 ML: 100; 10 SYRUP ORAL at 09:22

## 2024-04-15 RX ADMIN — IPRATROPIUM BROMIDE AND ALBUTEROL SULFATE 3 ML: .5; 3 SOLUTION RESPIRATORY (INHALATION) at 11:06

## 2024-04-15 RX ADMIN — NIFEDIPINE 30 MG: 30 TABLET, FILM COATED, EXTENDED RELEASE ORAL at 09:22

## 2024-04-15 RX ADMIN — Medication 10 ML: at 02:20

## 2024-04-15 RX ADMIN — BUMETANIDE 2 MG: 0.25 INJECTION INTRAMUSCULAR; INTRAVENOUS at 09:22

## 2024-04-15 RX ADMIN — EMPAGLIFLOZIN 10 MG: 10 TABLET, FILM COATED ORAL at 11:28

## 2024-04-15 RX ADMIN — LEVETIRACETAM 500 MG: 500 TABLET, FILM COATED ORAL at 20:33

## 2024-04-15 RX ADMIN — ARFORMOTEROL TARTRATE 15 MCG: 15 SOLUTION RESPIRATORY (INHALATION) at 11:06

## 2024-04-15 RX ADMIN — Medication 5 MG: at 20:33

## 2024-04-15 RX ADMIN — DULOXETINE HYDROCHLORIDE 30 MG: 30 CAPSULE, DELAYED RELEASE ORAL at 09:21

## 2024-04-15 RX ADMIN — IPRATROPIUM BROMIDE AND ALBUTEROL SULFATE 3 ML: .5; 3 SOLUTION RESPIRATORY (INHALATION) at 06:11

## 2024-04-15 RX ADMIN — DOXYCYCLINE 100 MG: 100 CAPSULE ORAL at 20:33

## 2024-04-15 RX ADMIN — CARVEDILOL 25 MG: 25 TABLET, FILM COATED ORAL at 09:22

## 2024-04-15 RX ADMIN — FERROUS SULFATE TAB 325 MG (65 MG ELEMENTAL FE) 325 MG: 325 (65 FE) TAB at 11:28

## 2024-04-15 RX ADMIN — CARVEDILOL 25 MG: 25 TABLET, FILM COATED ORAL at 20:33

## 2024-04-15 RX ADMIN — TRAZODONE HYDROCHLORIDE 50 MG: 50 TABLET ORAL at 20:33

## 2024-04-15 RX ADMIN — BUMETANIDE 2 MG: 0.25 INJECTION INTRAMUSCULAR; INTRAVENOUS at 15:14

## 2024-04-15 RX ADMIN — Medication 10 ML: at 20:34

## 2024-04-15 RX ADMIN — APIXABAN 5 MG: 5 TABLET, FILM COATED ORAL at 15:14

## 2024-04-15 RX ADMIN — INSULIN LISPRO 3 UNITS: 100 INJECTION, SOLUTION INTRAVENOUS; SUBCUTANEOUS at 21:23

## 2024-04-15 RX ADMIN — Medication 1 MG: at 09:22

## 2024-04-15 RX ADMIN — FAMOTIDINE 40 MG: 20 TABLET ORAL at 11:28

## 2024-04-15 RX ADMIN — BUMETANIDE 2 MG: 0.25 INJECTION INTRAMUSCULAR; INTRAVENOUS at 20:32

## 2024-04-15 RX ADMIN — GUAIFENESIN 1200 MG: 600 TABLET ORAL at 02:20

## 2024-04-15 RX ADMIN — GUAIFENESIN 1200 MG: 600 TABLET ORAL at 09:46

## 2024-04-15 RX ADMIN — BUDESONIDE 0.5 MG: 0.5 INHALANT RESPIRATORY (INHALATION) at 20:24

## 2024-04-15 RX ADMIN — BUDESONIDE 0.5 MG: 0.5 INHALANT RESPIRATORY (INHALATION) at 11:06

## 2024-04-15 RX ADMIN — METHYLPREDNISOLONE SODIUM SUCCINATE 40 MG: 40 INJECTION INTRAMUSCULAR; INTRAVENOUS at 09:46

## 2024-04-15 RX ADMIN — FINASTERIDE 5 MG: 5 TABLET, FILM COATED ORAL at 11:28

## 2024-04-15 RX ADMIN — INSULIN LISPRO 3 UNITS: 100 INJECTION, SOLUTION INTRAVENOUS; SUBCUTANEOUS at 16:43

## 2024-04-15 RX ADMIN — Medication 10 ML: at 09:23

## 2024-04-15 NOTE — PROGRESS NOTES
[facility]   Hospitalist Progress Note    Date of admission: 4/14/2024  Patient Name: Preston Wallis  1965  Date: 4/15/2024      Subjective     Chief Complaint   Patient presents with    Shortness of Breath       Interval Followup: Patient had been requiring BiPAP with only occasional breaks since admission.  Limited ability to get history from patient currently given continued severity of respiratory symptoms discussed with patient's significant other at bedside.  Outpatient had only been rarely using his NIPPV she attributes this more to issues with oxygenation with it more than a lack of adherence/desire to use.  Has not had any GI bleeding that she has noted.  No recent sick contacts        Objective     Vitals:   Temp:  [97.5 °F (36.4 °C)-98.4 °F (36.9 °C)] 98.4 °F (36.9 °C)  Heart Rate:  [61-66] 63  Resp:  [20-24] 20  BP: (119-150)/(56-73) 144/62  Flow (L/min):  [3-4] 4    Physical Exam  Tired lethargic morbidly obese in bed on BiPAP  Rhonchi/poor aeration, diminished in bases with faint crackles, dyspneic  RRR lower extremity pitting edema  Obese abdomen nontender  Alert to self and a few basic questions but difficult to obtain given lethargy    Result Review:  Vital signs, labs and recent relevant imaging reviewed.        acetaminophen    albuterol    aluminum-magnesium hydroxide-simethicone    benzonatate    senna-docusate sodium **AND** polyethylene glycol **AND** bisacodyl **AND** bisacodyl    busPIRone    dextrose    dextrose    Diclofenac Sodium    glucagon (human recombinant)    guaiFENesin-dextromethorphan    hydrOXYzine    Lidocaine    melatonin    nicotine    ondansetron    ondansetron    Pharmacy Meds to Bed Consult    prochlorperazine    sodium chloride    sodium chloride    sodium chloride    sodium chloride    apixaban, 5 mg, Oral, Q12H  arformoterol, 15 mcg, Nebulization, BID - RT  aspirin, 81 mg, Oral, Daily  atorvastatin, 20 mg, Oral, Nightly  budesonide, 0.5 mg, Nebulization, BID -  RT  bumetanide, 2 mg, Intravenous, TID  carvedilol, 25 mg, Oral, Q12H  DULoxetine, 30 mg, Oral, Daily  empagliflozin, 10 mg, Oral, Daily  famotidine, 40 mg, Oral, QAM  ferrous sulfate, 325 mg, Oral, Daily With Breakfast  finasteride, 5 mg, Oral, Daily  folic acid, 1 mg, Oral, Daily  guaiFENesin, 1,200 mg, Oral, Q12H  insulin detemir, 15 Units, Subcutaneous, Nightly  insulin lispro, 3-14 Units, Subcutaneous, 4x Daily AC & at Bedtime  ipratropium-albuterol, 3 mL, Nebulization, Q6H - RT  levETIRAcetam, 500 mg, Oral, BID  [START ON 4/16/2024] magnesium oxide, 400 mg, Oral, Daily  methylPREDNISolone sodium succinate, 40 mg, Intravenous, Daily  montelukast, 10 mg, Oral, Nightly  NIFEdipine XL, 30 mg, Oral, QAM  sodium chloride, 10 mL, Intravenous, Q12H  traZODone, 50 mg, Oral, Nightly        XR Chest 1 View    Result Date: 4/14/2024  Chronic changes in both lungs are similar to prior. Increased interstitial opacities bilaterally may reflect superimposed pulmonary edema. A small amount of right pleural fluid may also be present.  Electronically Signed By-Dr. Edison Guthrie MD On:4/14/2024 7:37 PM      XR Chest 1 View    Result Date: 4/13/2024  There has been no significant interval change.     CBC          3/3/2024    03:00 4/14/2024    19:23 4/15/2024    03:06   CBC   WBC 9.61  7.89  8.00    RBC 2.69  2.74  2.75    Hemoglobin 7.3  7.4  7.5    Hematocrit 24.7  26.0  25.7    MCV 91.8  94.9  93.5    MCH 27.1  27.0  27.3    MCHC 29.6  28.5  29.2    RDW 16.0  15.7  15.9    Platelets 285  312  327      CMP          3/11/2024    05:04 4/14/2024    19:23 4/15/2024    03:06   CMP   Glucose 54     54  234  158    BUN 41     41  23  23    Creatinine 2.41     2.41  1.92  1.90    EGFR 30.4     30.4  39.9  40.4    Sodium 139     139  138  136    Potassium 4.4     4.4  4.7  4.4    Chloride 93     93  93  92    Calcium 8.8     8.8  8.6  8.5    Total Protein  7.2     Albumin 2.9  3.4     Globulin  3.8     Total Bilirubin  0.2      Alkaline Phosphatase  193     AST (SGOT)  20     ALT (SGPT)  17     Albumin/Globulin Ratio  0.9     BUN/Creatinine Ratio 17.0     17.0  12.0  12.1    Anion Gap 9.2     9.2  6.6  7.3        Assessment / Plan     Summary: 58-year-old male with severe COPD FEV1 26%, paroxysmal A-fib on Eliquis, diastolic CHF, who presented with acutely worsening shortness of breath over the past week    Assessment/Plan (clinically significant if listed here)  AHHRF requiring bipap continuous  Severe COPD with acute exacerbation, 2 L baseline  Diastolic CHF with exacerbation  Bilateral pleural effusions right greater than left  Suspected CAP from unspecified bacterial organism  MILADY/OHS noncompliant to home nippv   Paroxysmal A-fib on Eliquis  History of DVT  CKD 3/4 baseline creatinine near 2  Hypertension  Seizure disorder on Keppra  Depression/mood disorder  Morbid obesity BMI 40  IDDM2 with neuropathy  Left foot diabetic ulcer, debrided 3/2024, MRSA infection at that time    Chronic anemia, appears secondary to chronic disease baseline hemoglobin near 7-8  History of cognitive decline  History of pneumonia secondary to Pseudomonas in 2020 and MRSA in 2021  Recent COVID-19 pneumonia in March    Check ct chest to better evaluate, send infectious studies as able for questionable pna  Bnp wnl but volume overload and given obesity suspect falsely low, increase bumex to 2 tid iv monitor I's and O's renal function Daily weights fluid restriction, Jardiance, additional GDMT as able  2/2024 echo - 61%, g1dd, no sig valvular abnormalities. Will defer repeat echo  Troponin trend flat/down, low than historic trend, suspect nstemi 2, no acute st elevation on EKG.  Continue Coreg, aspirin, statin, Eliquis for paroxysmal A-fib, nifedipine  Add brov/pulm, cont scheduled nebs, steroids, BPH  Bipap for notable hypercapnia  Obtain CT chest for better evaluation given limitations of chest x-ray, moderate bilateral pleural effusions noted and right  greater than left pneumonia, as well as right upper lobe opacity suspicious for pneumonia.  Will need outpatient CT chest to monitor for resolution.  Appears was treated with Zosyn on most recent discharge and March  Given prior pneumonia history (In 2020 did have Pseudomonas (and 3/2024 per dc summary) in 2021 MRSA on sputum) and CT, placed on 4/15 Doxy and cefepime for now follow-up cultures as able.  May warrant tobramycin and more extended antibiotic course versus more aggressive CHF treatment.  Pulmonology consulted given hypercapnia, pleural effusion, COPD, initial case discussed appreciate assistance  Gi ppx / cont pt's famotidine  Tsat 17, ferritin 117, appears AOCD; may benefit from epo.  hb 7.5 which is patients baseline for past several months, suspect AOCD, will also continue pts home po iron  .  Recent [facility] discharge had some bleeding from his foot did require 1 unit PRBCs there  Cont finasteride, doesn't appear to be on flomax, monitor for retention check pvr x2 days if neg will dc  Podiatry evaluated for Patient's left heel wound and left lateral plantar wound has notable eschar with surrounding skin irritation no acute drainage  Offload while in bed, Betadine to wounds, follow-up outpatient with podiatry no acute surgical intervention required does not suspect acutely infected foot wound  Continue Keppra  PT/OT  Check a.m. CBC, BMP, magnesium, phosphorus  Continue hospitalization at current level of care        DVT prophylaxis:  Medical DVT prophylaxis orders are present.      Code Status (Patient has no pulse and is not breathing): CPR (Attempt to Resuscitate)  Medical Interventions (Patient has pulse or is breathing): Full Support      Patient is critically ill due to acute hypoxic hypercapnic respiratory failure requiring BiPAP, COPD exacerbation, CHF exacerbation, bilateral pleural effusions, multiple additional numerous issues as above.  I have spent >31 minutes of critical care time reviewing  documentation including outside hospital records, pertinent labs, imaging studies, examining the patient, modifying care plan, and discussing patient's condition and care plan with the pt's wife, patient, nursing and sw, and pulmonology.

## 2024-04-15 NOTE — PLAN OF CARE
Goal Outcome Evaluation:  Plan of Care Reviewed With: patient        Progress: no change  Outcome Evaluation: Patient is on Bipap 14/6 35% tolerating well will try to take off for breaks throughout the day. Patient is awake and alert.

## 2024-04-15 NOTE — CASE MANAGEMENT/SOCIAL WORK
Mr. Wallis has has a noninvasive ventilator through [employer] for chronic respiratory failure secondary to COPD and MILADY/OHS.     He is not adherent with therapy. He has only used the device 8/90 days. Of those 8 days, only 3 were at least 4 hours in duration.

## 2024-04-15 NOTE — PROGRESS NOTES
RT EQUIPMENT DEVICE RELATED - SKIN ASSESSMENT    RT Medical Equipment/Device:     NIV Mask:  Under-the-nose   size:  b    Skin Assessment:      Cheek:  Intact  Neck:  Intact  Nose:  Intact    Device Skin Pressure Protection:  Skin-to-device areas padded:  None Required    Nurse Notification:  Kaylin Ellison, RRT

## 2024-04-15 NOTE — PLAN OF CARE
Goal Outcome Evaluation:  Plan of Care Reviewed With: patient        Progress: no change  Outcome Evaluation: NEW ADMIT FROM ED, PT HAS BEEN EXPRESSING ANXIETY WITH CONT BIPAP USE BUT SO FAR HAS NOT BEEN ABLE TO TOLERATE BEING OFF OF IT FOR VERY LONG (~7 MIN). LLE WOUND, WET TO DRY DRESSING APPLIED.Sarahy Westfall RN

## 2024-04-15 NOTE — CONSULTS
04/15/24   Foot and Ankle Surgery - Consult  Provider: Jordon Fuentes DPM  Location: [facility]    Subjective:  Preston Wallis is a 58 y.o. male.     Chief Complaint   Patient presents with    Shortness of Breath       Patient is a 58-year-old male known to podiatry services from previous admission.  Patient states he never followed up outpatient due to being admitted to [facility].  Patient says that when he was admitted to [facility] debrided his wounds and they returned.  Patient is accompanied by family and says that they have been changing the dressings wet to dry.  He first got the wounds several months ago while he was at a rehab stay.  Patient says that he was brought to the hospital due to shortness of breath.  Allergies   Allergen Reactions    Benadryl [Diphenhydramine] Itching    Proventil [Albuterol] Other (See Comments)     Mouth sores         Past Medical History:   Diagnosis Date    Age-related cognitive decline     Allergic contact dermatitis     Allergies     Anemia     Bronchiectasis with acute lower respiratory infection     Charcot foot due to diabetes mellitus 9/10/2013    Chronic diastolic (congestive) heart failure     Chronic kidney disease     Chronic respiratory failure with hypoxia     Closed supracondylar fracture of femur 1/12/2022    COPD (chronic obstructive pulmonary disease)     Deep vein thrombosis (DVT) of lower extremity associated with air travel 1/13/2023    Dependence on supplemental oxygen     Eczema     Erectile dysfunction     due to organic reasons    Essential (primary) hypertension     Fracture     closed fracture of other tarsal and metatarsal bones    Fracture of proximal humerus 1/13/2023    GERD without esophagitis     High risk medication use     Hypercholesteremia     Hypomagnesemia     Infected stasis ulcer of left lower extremity 1/13/2023    Insomnia     Low back pain     Major depressive disorder     Morbid (severe) obesity due to excess calories      MRSA pneumonia     Muscle weakness     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 without evidence of necrosis     Obstructive sleep apnea (adult) (pediatric)     Other forms of dyspnea     Other long term (current) drug therapy     Other specified noninfective gastroenteritis and colitis     Other spondylosis, lumbar region     Pain in both knees     Paroxysmal atrial fibrillation     Peripheral neuropathy     attributed to type 2 diabetes    Pneumonia, unspecified organism     Polyneuropathy     Rash and other nonspecific skin eruption     Syncope and collapse     Tachycardia     Tinnitus 2023    Type 1 diabetes mellitus with diabetic chronic kidney disease     Type 2 diabetes mellitus     Unspecified fall, initial encounter     Urinary retention        Past Surgical History:   Procedure Laterality Date    CHOLECYSTECTOMY      CYSTOSCOPY      FEMUR SURGERY Left     Shravan placed    KNEE SURGERY Left     OTHER SURGICAL HISTORY Left     venous port, REMOVED    PORTACATH PLACEMENT Right     TIBIAL PLATEAU OPEN REDUCTION INTERNAL FIXATION Left 2023    Procedure: TIBIAL PLATEAU OPEN REDUCTION INTERNAL FIXATION;  Surgeon: Hugo Kline MD;  Location: [facility];  Service: Orthopedics;  Laterality: Left;    TONSILLECTOMY AND ADENOIDECTOMY         Family History   Problem Relation Age of Onset    Coronary artery disease Mother     Hypertension Mother     Diabetes type II Mother     Asthma Father     Diabetes type II Sister     Cancer Sister        Social History     Socioeconomic History    Marital status:    Tobacco Use    Smoking status: Former     Current packs/day: 0.00     Average packs/day: 1 pack/day for 12.0 years (12.0 ttl pk-yrs)     Types: Cigarettes     Start date:      Quit date:      Years since quittin.3     Passive exposure: Past    Smokeless tobacco: Never   Vaping Use    Vaping status: Never Used   Substance and Sexual  Activity    Alcohol use: Not Currently    Drug use: Never    Sexual activity: Defer          Current Facility-Administered Medications:     acetaminophen (TYLENOL) tablet 650 mg, 650 mg, Oral, Q6H PRN, Shane Abarca MD    albuterol (PROVENTIL) nebulizer solution 0.083% 2.5 mg/3mL, 2.5 mg, Nebulization, Q4H PRN, Shane Abarca MD    aluminum-magnesium hydroxide-simethicone (MAALOX MAX) 400-400-40 MG/5ML suspension 15 mL, 15 mL, Oral, Q6H PRN, Shane Abarca MD    apixaban (ELIQUIS) tablet 5 mg, 5 mg, Oral, Q12H, Charles So MD    arformoterol (BROVANA) nebulizer solution 15 mcg, 15 mcg, Nebulization, BID - RT, Shane Abarca MD, 15 mcg at 04/15/24 1106    aspirin EC tablet 81 mg, 81 mg, Oral, Daily, Charles So MD, 81 mg at 04/15/24 0921    atorvastatin (LIPITOR) tablet 20 mg, 20 mg, Oral, Nightly, Charles So MD    benzonatate (TESSALON) capsule 100 mg, 100 mg, Oral, TID PRN, Shane Abarca MD    sennosides-docusate (PERICOLACE) 8.6-50 MG per tablet 2 tablet, 2 tablet, Oral, BID PRN **AND** polyethylene glycol (MIRALAX) packet 17 g, 17 g, Oral, Daily PRN **AND** bisacodyl (DULCOLAX) EC tablet 5 mg, 5 mg, Oral, Daily PRN **AND** bisacodyl (DULCOLAX) suppository 10 mg, 10 mg, Rectal, Daily PRN, Charles So MD    budesonide (PULMICORT) nebulizer solution 0.5 mg, 0.5 mg, Nebulization, BID - RT, Shane Abarca MD, 0.5 mg at 04/15/24 1106    bumetanide (BUMEX) injection 2 mg, 2 mg, Intravenous, TID, Shane Abarca MD    busPIRone (BUSPAR) tablet 15 mg, 15 mg, Oral, BID PRN, Charles So MD    carvedilol (COREG) tablet 25 mg, 25 mg, Oral, Q12H, Charles So MD, 25 mg at 04/15/24 0922    dextrose (D50W) (25 g/50 mL) IV injection 25 g, 25 g, Intravenous, Q15 Min PRN, Charles So MD    dextrose (GLUTOSE) oral gel 15 g, 15 g, Oral, Q15 Min PRN, Charles So MD    Diclofenac Sodium (VOLTAREN) 1 % gel 2 g, 2 g, Topical, 4x Daily PRN, Shane Abarca MD    DULoxetine (CYMBALTA)   capsule 30 mg, 30 mg, Oral, Daily, Charles So MD, 30 mg at 04/15/24 0921    empagliflozin (JARDIANCE) tablet 10 mg, 10 mg, Oral, Daily, Shane Abarca MD, 10 mg at 04/15/24 1128    famotidine (PEPCID) tablet 40 mg, 40 mg, Oral, QAM, Shane Abarca MD, 40 mg at 04/15/24 1128    ferrous sulfate tablet 325 mg, 325 mg, Oral, Daily With Breakfast, Shane Abarca MD, 325 mg at 04/15/24 1128    finasteride (PROSCAR) tablet 5 mg, 5 mg, Oral, Daily, Shane Abarca MD, 5 mg at 04/15/24 1128    folic acid (FOLVITE) tablet 1 mg, 1 mg, Oral, Daily, Charles So MD, 1 mg at 04/15/24 0922    glucagon (GLUCAGEN) injection 1 mg, 1 mg, Intramuscular, Q15 Min PRN, Charles So MD    guaiFENesin (MUCINEX) 12 hr tablet 1,200 mg, 1,200 mg, Oral, Q12H, Charles So MD, 1,200 mg at 04/15/24 0946    guaiFENesin-dextromethorphan (ROBITUSSIN DM) 100-10 MG/5ML syrup 10 mL, 10 mL, Oral, Q6H PRN, Charles So MD, 10 mL at 04/15/24 0922    hydrOXYzine (ATARAX) tablet 25 mg, 25 mg, Oral, TID PRN, Shane Abarca MD    insulin detemir (LEVEMIR) injection 15 Units, 15 Units, Subcutaneous, Nightly, Charles So MD    Insulin Lispro (humaLOG) injection 3-14 Units, 3-14 Units, Subcutaneous, 4x Daily AC & at Bedtime, Charles So MD    ipratropium-albuterol (DUO-NEB) nebulizer solution 3 mL, 3 mL, Nebulization, Q6H - RT, Charles So MD, 3 mL at 04/15/24 1106    levETIRAcetam (KEPPRA) tablet 500 mg, 500 mg, Oral, BID, Charles So MD, 500 mg at 04/15/24 0922    Lidocaine 4 % 1 patch, 1 patch, Transdermal, Daily PRN, Shane Abarca MD    [START ON 4/16/2024] magnesium oxide (MAG-OX) tablet 400 mg, 400 mg, Oral, Daily, Shane Abarca MD    melatonin tablet 5 mg, 5 mg, Oral, Nightly PRN, Shane Abarca MD    methylPREDNISolone sodium succinate (SOLU-Medrol) injection 40 mg, 40 mg, Intravenous, Daily, Charles So MD, 40 mg at 04/15/24 0946    montelukast (SINGULAIR) tablet 10 mg, 10 mg, Oral, Nightly,  Charles So MD    nicotine (NICODERM CQ) 21 MG/24HR patch 1 patch, 1 patch, Transdermal, Daily PRN, Shane Abarca MD    NIFEdipine XL (PROCARDIA XL) 24 hr tablet 30 mg, 30 mg, Oral, QAM, Charles So MD, 30 mg at 04/15/24 0922    ondansetron (ZOFRAN) injection 4 mg, 4 mg, Intravenous, Q6H PRN, Charles So MD    ondansetron (ZOFRAN) injection 4 mg, 4 mg, Intravenous, Q4H PRN, Shane Abarca MD    Pharmacy Meds to Bed Consult, , Does not apply, Continuous PRN, Charles So MD    prochlorperazine (COMPAZINE) injection 5 mg, 5 mg, Intravenous, Q6H PRN, Shane Abarca MD    sodium chloride 0.9 % flush 10 mL, 10 mL, Intravenous, PRN, Ari Murrieta, DO, 10 mL at 04/14/24 2143    sodium chloride 0.9 % flush 10 mL, 10 mL, Intravenous, Q12H, Charles So MD, 10 mL at 04/15/24 0923    sodium chloride 0.9 % flush 10 mL, 10 mL, Intravenous, PRN, Charles So MD    sodium chloride 0.9 % infusion 40 mL, 40 mL, Intravenous, PRN, Charles So MD    sodium chloride nasal spray 2 spray, 2 spray, Each Nare, PRN, Shane Abarca MD    traZODone (DESYREL) tablet 50 mg, 50 mg, Oral, Nightly, Shane Abarca MD    Review of Systems:  General: Denies fever, chills, fatigue, and weakness.  Eyes: Denies vision loss, blurry vision, and excessive redness.  ENT: Denies hearing issues and difficulty swallowing.  Cardiovascular: Denies palpitations, chest pain, or syncopal episodes.  Respiratory: Denies shortness of breath, wheezing, and coughing.  GI: Denies abdominal pain, nausea, and vomiting.   : Denies frequency, hematuria, and urgency.  Musculoskeletal: Denies muscle cramps, joint pains, and stiffness.  Derm: Denies rash, open wounds, or suspicious lesions.  Neuro: Denies headaches, numbness, loss of coordination, and tremors.  Psych: Denies anxiety and depression.  Endocrine: Denies temperature intolerance and changes in appetite.  Heme: Denies bleeding disorders or abnormal bruising.     Objective   BP  "144/62 (BP Location: Left arm, Patient Position: Lying)   Pulse 74   Temp 98.4 °F (36.9 °C) (Oral)   Resp 20   Ht 175.3 cm (69\")   Wt 124 kg (272 lb 14.9 oz)   SpO2 93%   BMI 40.30 kg/m²     Foot/Ankle Exam    GENERAL  Appearance:  appears stated age  Orientation:  AAOx3  Affect:  appropriate  Gait:  unimpaired  Assistance:  independent  Right shoe gear: casual shoe  Left shoe gear: casual shoe    VASCULAR     Right Foot Vascularity   Dorsalis pedis:  2+  Posterior tibial:  2+  Skin temperature:  warm  Edema gradin+ and non-pitting  CFT:  < 3 seconds  Pedal hair growth:  Present  Varicosities:  none     Left Foot Vascularity   Dorsalis pedis:  2+  Posterior tibial:  2+  Skin temperature:  warm  Edema gradin+ and non-pitting  CFT:  < 3 seconds  Pedal hair growth:  Present  Varicosities:  none     NEUROLOGIC     Right Foot Neurologic   Light touch sensation: absent  Vibratory sensation: absent  Hot/Cold sensation: absent     Left Foot Neurologic   Light touch sensation: absent  Vibratory sensation: absent  Hot/Cold sensation:  absent    MUSCLE STRENGTH     Right Foot Muscle Strength   Foot dorsiflexion:  2  Foot plantar flexion:  2  Foot inversion:  2  Foot eversion:  2     Left Foot Muscle Strength   Foot dorsiflexion:  2  Foot plantar flexion:  2  Foot inversion:  2  Foot eversion:  2    RANGE OF MOTION     Right Foot Range of Motion   Foot and ankle ROM within normal limits       Left Foot Range of Motion   Foot and ankle ROM within normal limits      DERMATOLOGIC      Right Foot Dermatologic   Skin  Right foot skin is intact.      Left Foot Dermatologic   Skin  Left foot skin is intact.      Left foot additional comments: Full-thickness ulcerations to fifth MPJ and left heel.  Necrotic eschar to both wounds.  No erythema no malodor no drainage.            Results from last 7 days   Lab Units 04/15/24  0306   WBC 10*3/mm3 8.00   HEMOGLOBIN g/dL 7.5*   HEMATOCRIT % 25.7*   PLATELETS 10*3/mm3 327 "       Assessment & Plan     Active Hospital Problems    Diagnosis     **Acute on chronic respiratory failure with hypercapnia     Chronic respiratory failure with hypoxia and hypercapnia     Wheezing     Ulcer of left foot, limited to breakdown of skin     Type 2 DM with CKD stage 3 and hypertension     Stage 3b chronic kidney disease     Tobacco abuse, in remission     Paroxysmal atrial fibrillation     Obstructive sleep apnea     COPD exacerbation     Seizures          Patient with decubitus ulcerations to the left foot without signs of infection.    Offload while in bed at all times    Betadine daily to wounds    Patient can follow-up outpatient after discharge for routine wound care.    Thank you for the consultation and allowing me to participate in this patient's care. Please call with any additional questions or concerns.     Part of this note may be an electronic transcription/translation of spoken language to printed text using the Dragon Dictation System.

## 2024-04-15 NOTE — H&P
[facility]   HISTORY AND PHYSICAL    Patient Name: Preston Wallis  : 1965  MRN: [id number]  Primary Care Physician:  Kimmy Riley MD  Date of admission: 2024    Subjective   Subjective     Chief Complaint: Shortness of breath    HPI:    Preston Wallis is a 58 y.o. male with past medical history of diabetes, hypertension, hyperlipidemia, COPD, CHF, DVT on Eliquis, eczema, chronic anemia, cognitive decline, morbid obesity, and GERD presented to the ED with complaint of shortness of breath.  Patient states that for the last week or so he has been having worsening shortness of breath where he stomatic even at rest along with worsening lower extremity edema orthopnea, generalized weakness and fatigue.  Patient was seen at this facility a few weeks ago with similar complaints and was treated for a CHF exacerbation.  Due to his worsening symptoms patient came to the ED for further evaluation.  In the ED patient was hypoxic on arrival requiring oxygen supplementation via nasal cannula with remaining vitals being within normal limits.  Labs showed he had significant anemia with elevated proBNP level, reduced renal function, ABG showing hypercapnic respiratory failure.  Troponin was elevated but delta was negative.  Chest x-ray showed chronic findings along with increased pulmonary edema.  When asked he denied any recent fevers, chills, headaches, focal weakness, chest pain, palpitation, abdominal pain, nausea, vomiting, diarrhea, constipation, dysuria, hematuria, hematochezia, melena, or anxiety.  Patient admitted for evaluation and treatment.    Review of Systems   All systems were reviewed and negative except for: as per HPI    Personal History     Past Medical History:   Diagnosis Date    Age-related cognitive decline     Allergic contact dermatitis     Allergies     Anemia     Bronchiectasis with acute lower respiratory infection     Charcot foot due to diabetes mellitus 9/10/2013    Chronic  diastolic (congestive) heart failure     Chronic kidney disease     Chronic respiratory failure with hypoxia     Closed supracondylar fracture of femur 1/12/2022    COPD (chronic obstructive pulmonary disease)     Deep vein thrombosis (DVT) of lower extremity associated with air travel 1/13/2023    Dependence on supplemental oxygen     Eczema     Erectile dysfunction     due to organic reasons    Essential (primary) hypertension     Fracture     closed fracture of other tarsal and metatarsal bones    Fracture of proximal humerus 1/13/2023    GERD without esophagitis     High risk medication use     Hypercholesteremia     Hypomagnesemia     Infected stasis ulcer of left lower extremity 1/13/2023    Insomnia     Low back pain     Major depressive disorder     Morbid (severe) obesity due to excess calories     MRSA pneumonia     Muscle weakness     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 without evidence of necrosis     Obstructive sleep apnea (adult) (pediatric)     Other forms of dyspnea     Other long term (current) drug therapy     Other specified noninfective gastroenteritis and colitis     Other spondylosis, lumbar region     Pain in both knees     Paroxysmal atrial fibrillation     Peripheral neuropathy     attributed to type 2 diabetes    Pneumonia, unspecified organism     Polyneuropathy     Rash and other nonspecific skin eruption     Syncope and collapse     Tachycardia     Tinnitus 1/13/2023    Type 1 diabetes mellitus with diabetic chronic kidney disease     Type 2 diabetes mellitus     Unspecified fall, initial encounter     Urinary retention        Past Surgical History:   Procedure Laterality Date    CHOLECYSTECTOMY      CYSTOSCOPY      FEMUR SURGERY Left     Shravan placed    KNEE SURGERY Left     OTHER SURGICAL HISTORY Left     venous port, REMOVED    PORTACATH PLACEMENT Right     TIBIAL PLATEAU OPEN REDUCTION INTERNAL FIXATION Left 12/22/2023    Procedure:  TIBIAL PLATEAU OPEN REDUCTION INTERNAL FIXATION;  Surgeon: Hugo Kline MD;  Location: [facility];  Service: Orthopedics;  Laterality: Left;    TONSILLECTOMY AND ADENOIDECTOMY         Family History: family history includes Asthma in his father; Cancer in his sister; Coronary artery disease in his mother; Diabetes type II in his mother and sister; Hypertension in his mother. Otherwise pertinent FHx was reviewed and not pertinent to current issue.    Social History:  reports that he quit smoking about 31 years ago. His smoking use included cigarettes. He started smoking about 43 years ago. He has a 12 pack-year smoking history. He has been exposed to tobacco smoke. He has never used smokeless tobacco. He reports that he does not currently use alcohol. He reports that he does not use drugs.    Home Medications:  Budeson-Glycopyrrol-Formoterol, Cholecalciferol, DULoxetine, Insulin Lispro, NIFEdipine XL, O2, Sodium Hypochlorite, Zinc, albuterol sulfate HFA, apixaban, aspirin, atorvastatin, bumetanide, busPIRone, calcium citrate, carvedilol, collagenase, dapagliflozin, docusate sodium, exenatide er, famotidine, ferrous gluconate, finasteride, folic acid, insulin detemir, ipratropium-albuterol, levETIRAcetam, magnesium oxide, montelukast, multivitamin with iron, nicotine, ondansetron, silver sulfadiazine, traZODone, and vitamin C      Allergies:  Allergies   Allergen Reactions    Benadryl [Diphenhydramine] Itching    Proventil [Albuterol] Other (See Comments)     Mouth sores         Objective   Objective     Vitals:   Temp:  [97.5 °F (36.4 °C)-97.9 °F (36.6 °C)] 97.5 °F (36.4 °C)  Heart Rate:  [61-66] 64  Resp:  [20-24] 20  BP: (119-150)/(56-73) 150/73  Flow (L/min):  [3-4] 3  Physical Exam    Constitutional: Awake, alert   Eyes: PERRLA, sclerae anicteric, no conjunctival injection   HENT: NCAT, mucous membranes moist   Neck: Supple, no thyromegaly, no lymphadenopathy, trachea midline   Respiratory:  Decreased breath sounds bilaterally, nasal cannula   Cardiovascular: RRR, Tollett murmur, rubs, or gallops, palpable pedal pulses bilaterally   Gastrointestinal: Positive bowel sounds, soft, nontender, nondistended   Musculoskeletal: +2 lower extremity edema, no clubbing or cyanosis to extremities   Psychiatric: Appropriate affect, cooperative   Neurologic: Oriented x 3, strength symmetric in all extremities, Cranial Nerves grossly intact to confrontation, speech clear   Skin: Plantar ulcer on the left    Result Review    Result Review:  I have personally reviewed the results from the time of this admission to 4/15/2024 03:44 EDT and agree with these findings:  [x]  Laboratory list / accordion  []  Microbiology  [x]  Radiology  [x]  EKG/Telemetry   []  Cardiology/Vascular   []  Pathology  []  Old records  []  Other:  Most notable findings include: Hypercapnic respiratory failure on ABG, anemia, elevated troponin with flat delta, elevated reticulocyte count, chest x-ray with pulmonary edema      Assessment & Plan   Assessment / Plan     Brief Patient Summary:  Preston Wallis is a 58 y.o. male with past medical history of diabetes, hypertension, hyperlipidemia, COPD, CHF, DVT on Eliquis, eczema, chronic anemia, cognitive decline, morbid obesity, and GERD presented to the ED with complaint of shortness of breath    Active Hospital Problems:  Active Hospital Problems    Diagnosis     **Acute on chronic respiratory failure with hypercapnia     Chronic respiratory failure with hypoxia and hypercapnia     Wheezing     Ulcer of left foot, limited to breakdown of skin     Type 2 DM with CKD stage 3 and hypertension     Stage 3b chronic kidney disease     Tobacco abuse, in remission     Paroxysmal atrial fibrillation     Obstructive sleep apnea     COPD exacerbation     Seizures      Plan:     Acute on chronic respiratory failure with hypercapnia  -Admit to telemetry  -Secondary to CHF exacerbation with superimposed  COPD  -MILADY  -BiPAP  -Supplemental oxygen as needed  -Chest x-ray reviewed  -BNP above baseline  -Troponin elevated, (chronic).  Will trend  -We will diurese with IV Bumex 2 mg twice daily.  Titrate as needed  -Strict input output  -1.5 L fluid restriction  -Daily weights  -We will consult cardiology if warranted  -IV Solu-Medrol  -DuoNeb 3 times daily and as needed  -Mucinex, Tessalon Perles  -Incentive spirometry  -Adjust home meds if warranted  -Consult pulmonology if warranted  -Supportive care    Diabetes  -Insulin sliding scale  -Levemir at bedtime  -Titrate as needed    HTN  -Currently well controlled  -PRN BP meds  -Resume home meds when available  -Titrate if needed    Hx DVT And Afib  -Resume Eliquis    Foot Ulcers  -Podiatry consulted    Anemia  -Chronic  -Reticulocyte count  -Anemia panel  -Transfuse as needed    Morbid obesity  CAD  COPD  CHF  CKD  MILADY    GI ppx      CODE STATUS: Full code     Admission Status:  I believe this patient meets inpatient status.      Electronically signed by Charles So MD, 04/15/24, 3:44 AM EDT.

## 2024-04-15 NOTE — PROGRESS NOTES
Respiratory Therapist Broncho-Pulmonary Hygiene Progress Note      Patient Name:  Preston Wallis  YOB: 1965    Preston Wallis meets the qualification for Level 2 of the Bronco-Pulmonary Hygiene Protocol. This was based on my daily patient assessment and includes review of chest x-ray results, cough ability and quality, oxygenation, secretions or risk for secretion development and patient mobility.     Broncho-Pulmonary Hygiene Assessment:    Level of Movement: Actively changing positions without assistance  Alert/ oriented/ cooperative    Breath Sounds: Diminished and/or coarse rhonchi    Cough: Strong, effective    Chest X-Ray: Presence of atelectasis and/or consolidation    Sputum Productions: None or small amount of thin or watery secretions with effective cough    History and Physical: New onset of bronchitis or existing chronic pulmonary conditions.  **(not in an exacerbation) and Chronic condition    SpO2 to Oxygen Need: greater than 92% on 4-6L nasal canula    Current SpO2 is: 92 on 4L    Based on this information, I have completed the following interventions: Aerobika with bronchodialtor medication or TID      Electronically signed by Talisha Ellison RRT, 04/15/24, 10:04 AM EDT.

## 2024-04-15 NOTE — SIGNIFICANT NOTE
Wound Eval / Progress Noted    YAIMA Stockton     Patient Name: Preston Wallis  : 1965  MRN: [id number]  Today's Date: 4/15/2024                 Admit Date: 2024    Visit Dx:    ICD-10-CM ICD-9-CM   1. Acute respiratory failure with hypercapnia  J96.02 518.81         Acute on chronic respiratory failure with hypercapnia    Paroxysmal atrial fibrillation    Obstructive sleep apnea    COPD exacerbation    Tobacco abuse, in remission    Seizures    Type 2 DM with CKD stage 3 and hypertension    Stage 3b chronic kidney disease    Ulcer of left foot, limited to breakdown of skin    Wheezing    Chronic respiratory failure with hypoxia and hypercapnia        Past Medical History:   Diagnosis Date    Age-related cognitive decline     Allergic contact dermatitis     Allergies     Anemia     Bronchiectasis with acute lower respiratory infection     Charcot foot due to diabetes mellitus 9/10/2013    Chronic diastolic (congestive) heart failure     Chronic kidney disease     Chronic respiratory failure with hypoxia     Closed supracondylar fracture of femur 2022    COPD (chronic obstructive pulmonary disease)     Deep vein thrombosis (DVT) of lower extremity associated with air travel 2023    Dependence on supplemental oxygen     Eczema     Erectile dysfunction     due to organic reasons    Essential (primary) hypertension     Fracture     closed fracture of other tarsal and metatarsal bones    Fracture of proximal humerus 2023    GERD without esophagitis     High risk medication use     Hypercholesteremia     Hypomagnesemia     Infected stasis ulcer of left lower extremity 2023    Insomnia     Low back pain     Major depressive disorder     Morbid (severe) obesity due to excess calories     MRSA pneumonia     Muscle weakness     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 without evidence of necrosis     Obstructive sleep apnea (adult) (pediatric)     Other  forms of dyspnea     Other long term (current) drug therapy     Other specified noninfective gastroenteritis and colitis     Other spondylosis, lumbar region     Pain in both knees     Paroxysmal atrial fibrillation     Peripheral neuropathy     attributed to type 2 diabetes    Pneumonia, unspecified organism     Polyneuropathy     Rash and other nonspecific skin eruption     Syncope and collapse     Tachycardia     Tinnitus 1/13/2023    Type 1 diabetes mellitus with diabetic chronic kidney disease     Type 2 diabetes mellitus     Unspecified fall, initial encounter     Urinary retention         Past Surgical History:   Procedure Laterality Date    CHOLECYSTECTOMY      CYSTOSCOPY      FEMUR SURGERY Left     Shravan placed    KNEE SURGERY Left     OTHER SURGICAL HISTORY Left     venous port, REMOVED    PORTACATH PLACEMENT Right     TIBIAL PLATEAU OPEN REDUCTION INTERNAL FIXATION Left 12/22/2023    Procedure: TIBIAL PLATEAU OPEN REDUCTION INTERNAL FIXATION;  Surgeon: Hugo Kline MD;  Location: [facility]  Service: Orthopedics;  Laterality: Left;    TONSILLECTOMY AND ADENOIDECTOMY           Physical Assessment:  Wound Left gluteal (Active)   Dressing Appearance open to air 04/15/24 1215   Closure None 04/15/24 1215   Base pink;blanchable;dry 04/15/24 1215   Periwound intact;dry;pink 04/15/24 1215   Periwound Temperature warm 04/15/24 1215   Periwound Skin Turgor soft 04/15/24 1215   Edges rolled/closed 04/15/24 1215   Drainage Amount none 04/15/24 1215   Care, Wound cleansed with;sterile normal saline 04/15/24 1215   Dressing Care open to air 04/15/24 1215       Wound 12/22/23 1322 Left posterior heel Pressure Injury (Active)   Wound Image   04/15/24 1215   Pressure Injury Stage U 04/15/24 1215   Dressing Appearance open to air 04/15/24 1215   Closure None 04/15/24 1215   Base black eschar;dry;necrotic 04/15/24 1215   Black (%), Wound Tissue Color 100 04/15/24 1215   Periwound intact;dry;redness 04/15/24  1215   Periwound Temperature warm 04/15/24 1215   Periwound Skin Turgor firm 04/15/24 1215   Edges jagged;rolled/closed 04/15/24 1215   Wound Length (cm) 4.7 cm 04/15/24 1215   Wound Width (cm) 5 cm 04/15/24 1215   Wound Depth (cm) 0.2 cm 04/15/24 1215   Wound Surface Area (cm^2) 23.5 cm^2 04/15/24 1215   Wound Volume (cm^3) 4.7 cm^3 04/15/24 1215   Drainage Amount none 04/15/24 1215   Care, Wound cleansed with;sterile normal saline 04/15/24 1215   Dressing Care dressing applied;dressing moistened;gauze, [id number];abdominal pad;gauze;tubular wrap;other (see comments) 04/15/24 1215   Periwound Care absorptive dressing applied 04/15/24 1215       Wound 02/16/24 1700 Left posterior foot Pressure Injury (Active)   Wound Image   04/15/24 1215   Pressure Injury Stage U 04/15/24 1215   Dressing Appearance open to air 04/15/24 1215   Closure None 04/15/24 1215   Base black eschar;dry;necrotic 04/15/24 1215   Black (%), Wound Tissue Color 100 04/15/24 1215   Periwound intact;redness 04/15/24 1215   Periwound Temperature warm 04/15/24 1215   Periwound Skin Turgor firm 04/15/24 1215   Edges rolled/closed;jagged 04/15/24 1215   Wound Length (cm) 3 cm 04/15/24 1215   Wound Width (cm) 1.3 cm 04/15/24 1215   Wound Depth (cm) 0.3 cm 04/15/24 1215   Wound Surface Area (cm^2) 3.9 cm^2 04/15/24 1215   Wound Volume (cm^3) 1.17 cm^3 04/15/24 1215   Drainage Amount none 04/15/24 1215   Care, Wound cleansed with;sterile normal saline 04/15/24 1215   Dressing Care dressing applied;dressing moistened;gauze, [id number];abdominal pad;gauze, dry;tubular wrap;other (see comments) 04/15/24 1215   Periwound Care absorptive dressing applied 04/15/24 1215          Wound Check / Follow-up:  Patient seen today for a wound consult. Patient is awake and alert, on continuous BiPAP at this time. Wife and daughter are present at the bedside at this time.     Patient with existing chronic wounds to the left heel and left lateral plantar foot.  Unstageable injuries present at this time. Wound bases are completely obscured at this time with black eschar present. Periwound is reddened and intact. Cleansed with NS and gauze. Applied betadine moistened fluffed gauze to the wound bases and secured with an ABD pad and gauze roll. Podiatry recommending to have betadine moistened fluffed gauze applied to the wounds daily. Keep the heels offloaded at all times.     Left anterior lower leg with scattered dry scabs. No visible or open wound bases present. Cleansed with NS. Recommending daily application of the purple top moisturizer to the bilateral legs and feet.    No other skin breakdown noted at this time. Will recommend to implement Q2H turns and offload at all times. Keep the patient clean and free from all moisture. Apply the blue top moisture barrier TID / PRN to the buttocks.    Impression: chronic non-healing wounds to the left heel and left lateral foot    Short term goals:  regain skin integrity, skin protection, topical treatment, pressure reduction, moisture prevention, daily dressing changes    Soco Cardoza RN    4/15/2024    16:27 EDT

## 2024-04-15 NOTE — CONSULTS
Pulmonary / Critical Care Consult Note      Patient Name: Preston Wallis  : 1965  MRN: [id number]  Primary Care Physician:  Kimmy Riley MD  Referring Physician: Shane Abarca MD  Date of admission: 2024    Subjective   Subjective     Reason for Consult/ Chief Complaint:   Acute on chronic hypercapnic respiratory failure requiring NIPPV, CHF exacerbation    HPI:  Preston Wallis is a 58 y.o. male with past medical history of severe COPD with an FEV1 of 26%, CHF, anemia history of recurrent Pseudomonas infections with MDRO Pseudomonas, history of PE in 2019 on Xarelto, tobacco abuse of cigarettes in remission, MILADY with home NIV, morbid obesity, and cognitive decline presented to the ED for evaluation of shortness of breath quiring supplemental oxygen.  In the ED, ABG was 7.2 , troponins were 118, proBNP 807.   CXR demonstrated chronic changes in both lungs similar to prior exams with increased interstitial opacities bilaterally possibly reflecting pulmonary edema.  Small amount of right pleural fluid may also be present.  For the above reasons, the service was consulted to assist in the management and treatment of the patient's acute issues.  Upon assessment, patient lying in bed on continuous NIPPV.  Findings and plan also rediscussed with the family member at bedside.  Family and providing patient was recently hospitalized at [facility] where he began having troubles with his home NIV unit.  Patient was discharged and went home where he still continue to have difficulties with his home unit.  Member stated that the patient has become increasingly weak and having more more difficulties with his home unit coupled with anxiety when he wears his mask.  Patient is currently on BiPAP and short of breath with very little activity.  Cannot come off BiPAP without becoming profoundly dyspneic.  Also reporting some wheezing recently and occasional dry cough.  No fevers or  chills.        Personal History     Past Medical History:   Diagnosis Date    Age-related cognitive decline     Allergic contact dermatitis     Allergies     Anemia     Bronchiectasis with acute lower respiratory infection     Charcot foot due to diabetes mellitus 9/10/2013    Chronic diastolic (congestive) heart failure     Chronic kidney disease     Chronic respiratory failure with hypoxia     Closed supracondylar fracture of femur 1/12/2022    COPD (chronic obstructive pulmonary disease)     Deep vein thrombosis (DVT) of lower extremity associated with air travel 1/13/2023    Dependence on supplemental oxygen     Eczema     Erectile dysfunction     due to organic reasons    Essential (primary) hypertension     Fracture     closed fracture of other tarsal and metatarsal bones    Fracture of proximal humerus 1/13/2023    GERD without esophagitis     High risk medication use     Hypercholesteremia     Hypomagnesemia     Infected stasis ulcer of left lower extremity 1/13/2023    Insomnia     Low back pain     Major depressive disorder     Morbid (severe) obesity due to excess calories     MRSA pneumonia     Muscle weakness     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 without evidence of necrosis     Obstructive sleep apnea (adult) (pediatric)     Other forms of dyspnea     Other long term (current) drug therapy     Other specified noninfective gastroenteritis and colitis     Other spondylosis, lumbar region     Pain in both knees     Paroxysmal atrial fibrillation     Peripheral neuropathy     attributed to type 2 diabetes    Pneumonia, unspecified organism     Polyneuropathy     Rash and other nonspecific skin eruption     Syncope and collapse     Tachycardia     Tinnitus 1/13/2023    Type 1 diabetes mellitus with diabetic chronic kidney disease     Type 2 diabetes mellitus     Unspecified fall, initial encounter     Urinary retention        Past Surgical History:    Procedure Laterality Date    CHOLECYSTECTOMY      CYSTOSCOPY      FEMUR SURGERY Left     Shravan placed    KNEE SURGERY Left     OTHER SURGICAL HISTORY Left     venous port, REMOVED    PORTACATH PLACEMENT Right     TIBIAL PLATEAU OPEN REDUCTION INTERNAL FIXATION Left 12/22/2023    Procedure: TIBIAL PLATEAU OPEN REDUCTION INTERNAL FIXATION;  Surgeon: Hugo Kline MD;  Location: [facility];  Service: Orthopedics;  Laterality: Left;    TONSILLECTOMY AND ADENOIDECTOMY         Family History: family history includes Asthma in his father; Cancer in his sister; Coronary artery disease in his mother; Diabetes type II in his mother and sister; Hypertension in his mother. Otherwise pertinent FHx was reviewed and not pertinent to current issue.    Social History:  reports that he quit smoking about 31 years ago. His smoking use included cigarettes. He started smoking about 43 years ago. He has a 12 pack-year smoking history. He has been exposed to tobacco smoke. He has never used smokeless tobacco. He reports that he does not currently use alcohol. He reports that he does not use drugs.    Home Medications:  Budeson-Glycopyrrol-Formoterol, Calcium Citrate, Cholecalciferol, DULoxetine, Insulin Lispro, NIFEdipine XL, O2, albuterol sulfate HFA, apixaban, aspirin, atorvastatin, bumetanide, busPIRone, carvedilol, dapagliflozin, docusate sodium, doxycycline, famotidine, ferrous gluconate, finasteride, folic acid, insulin detemir, ipratropium-albuterol, levETIRAcetam, magnesium oxide, montelukast, multivitamin with iron, traZODone, and vitamin C    Allergies:  Allergies   Allergen Reactions    Benadryl [Diphenhydramine] Itching    Proventil [Albuterol] Other (See Comments)     Mouth sores         Objective    Objective     Vitals:   Temp:  [97.5 °F (36.4 °C)-98.4 °F (36.9 °C)] 97.7 °F (36.5 °C)  Heart Rate:  [61-81] 74  Resp:  [18-24] 18  BP: (119-153)/(56-73) 153/60  Flow (L/min):  [3-4] 4    Physical Exam:  Vital  Signs Reviewed   General:  WDWN, Alert, NAD.  Chronically ill-appearing male,  HEENT:  PERRL, EOMI.  OP, nares clear, no sinus tenderness  Neck:  Supple, no JVD, no thyromegaly  Lymph: no axillary, cervical, supraclavicular lymphadenopathy noted bilaterally  Chest: Poor aeration with bibasilar crackles, on continuous NIPPV, increased work of breathing noted  CV: RRR, no MGR, pulses 2+, equal.  Abd:  Soft, NT, ND, + BS, no HSM  EXT:  no clubbing, no cyanosis, anasarca, BLE edema  Neuro:  A&Ox3, CN grossly intact, no focal deficits.  Skin: No rashes or lesions noted, multiple tattoos noted      Result Review    Result Review:  I have personally reviewed the results from the time of this admission to 4/15/2024 16:24 EDT and agree with these findings:  [x]  Laboratory  [x]  Microbiology  [x]  Radiology  []  EKG/Telemetry   []  Cardiology/Vascular   []  Pathology  [x]  Old records  [x]  Other:  Most notable findings include:         Lab 04/15/24  0306 04/14/24  1923   WBC 8.00 7.89   HEMOGLOBIN 7.5* 7.4*   HEMATOCRIT 25.7* 26.0*   PLATELETS 327 312   SODIUM 136 138   POTASSIUM 4.4 4.7   CHLORIDE 92* 93*   CO2 36.7* 38.4*   BUN 23* 23*   CREATININE 1.90* 1.92*   GLUCOSE 158* 234*   CALCIUM 8.5* 8.6   TOTAL PROTEIN  --  7.2   ALBUMIN  --  3.4*   GLOBULIN  --  3.8     Chest x-ray with worsening bilateral airspace disease.  Question possible loculated fluid in the right major fissure  NT   Cultures so far pending/negative      Assessment & Plan   Assessment / Plan     Active Hospital Problems:  Active Hospital Problems    Diagnosis     **Acute on chronic respiratory failure with hypercapnia     Chronic respiratory failure with hypoxia and hypercapnia     Wheezing     Ulcer of left foot, limited to breakdown of skin     Type 2 DM with CKD stage 3 and hypertension     Stage 3b chronic kidney disease     Tobacco abuse, in remission     Paroxysmal atrial fibrillation     Obstructive sleep apnea     COPD exacerbation      Seizures        Impression:  Acute on chronic hypercapnic respiratory failure requiring NIPPV  Severe COPD with exacerbation, FEV1 26%  Obesity hypoventilation syndrome  NSTEMI type II from demand ischemia  Acute on chronic decompensated congestive diastolic heart failure  Acute cardiogenic pulmonary edema  Anasarca  Anemia  CKD  Hypocalcemia  Medical noncompliance of NIV  History of MILADY  Tobacco abuse in remission        Plan:  -Continue to wean supplemental oxygen to maintain SpO2 greater than 90%.  Respiratory status remains tenuous given anasarca and FEV1 of 26%  -4/14 ABG 7.26/89/97  -4/14 CXR demonstrating chronic changes in both lungs similar to prior exam with increased interstitial opacities bilaterally reflecting superimposed pulmonary edema.  Small amount of right pleural effusion may also be present.  -Chest CT pending  -Micro negative today.  Infectious markers normal at this time.  Check Legionella, strep pneumo, and respiratory viral panel.  Sputum culture pending  -Continue Brovana, Pulmicort, DuoNebs  -Continue Solu-Medrol 40 mg IV daily  -Continue bronchopulmonary bronchodilator protocols  -Continue Jardiance  -Continue Bumex 2 mg IV 3 times daily  -Continue to monitor renal panel and electrolytes.  Replace electrolytes as necessary  -Continue Eliquis for A-fib  -PT/OT on board.  Appreciate assistance  -RT  on board.  Appreciate assistance    DVT prophylaxis:  Medical DVT prophylaxis orders are present.    Code Status and Medical Interventions:   Ordered at: 04/15/24 0954     Code Status (Patient has no pulse and is not breathing):    CPR (Attempt to Resuscitate)     Medical Interventions (Patient has pulse or is breathing):    Full Support      Labs, imaging, notes and medications personally reviewed.  Discussed with primary and patient    Thank you for involving me in the care of this patient.    Electronically signed by CON Sampson, 04/15/24, 2:56 PM EDT.    Dr. SARANYA  Severiano Carolina, have spent more than 50% of the total time managing the patient in this encounter today.  This included personally reviewing all pertinent labs, imaging, microbiology and documentation. Also discussing the case with the patient and any available family, the admitting physician and any available ancillary staff.    Electronically signed by Severiano Carolina MD, 04/15/24, 4:26 PM EDT.

## 2024-04-15 NOTE — PLAN OF CARE
Goal Outcome Evaluation:  Plan of Care Reviewed With: patient, spouse        Progress: no change  Outcome Evaluation: Patient is A&Ox4. NSR. Continously wears bipap 14/6 35% and taking short breaks for meals wearing 3L NC. Patient is anxious when off of bipap. Straight cath performed per protocol, patient straight caths Q4H at home for neurogenic bladder. CT of chest completed. Wound care assessed and completed.

## 2024-04-15 NOTE — PAYOR COMM NOTE
"PATIENT INFORMATION  Name:  Preston Wallis  MRN#:     [id number]  :  1965         ADMISSION INFORMATION  CLASS: Inpatient   DOS:  24        CURRENT ATTENDING PROVIDER INFORMATION  Name/NPI: Shane Abarca MD [[id number]]  Phone:             Phone: [phone]        REQUESTING PROVIDER and RENDERING FACILITY  Name:  [facility]   NPI:  [id number]  TID:  [id number]  Address:      [address] [place], [place] [address] [address]  Phone  [phone]        UTILIZATION REVIEW CONTACT INFORMATION  Phone:      [phone]  Fax:           [phone]        ADMISSION DIAGNOSIS  Acute respiratory failure with hypercapnia [J96.02]  Acute on chronic respiratory failure with hypercapnia [J96.22]        ++++++++++++++++++++++++++++++++++++++++++++++++++++++++++++++++++++++++++++++++        Preston Wallis \"Gómez\" (58 y.o. Male)       Date of Birth   1965    Social Security Number       Address   [address] [place] [address]    Home Phone       MRN   [id number]       Anabaptism   Restoration    Marital Status                               Admission Date   24    Admission Type   Emergency    Admitting Provider   Charles So MD    Attending Provider   Shane Abarca MD    Department, Room/Bed   [facility] 5TH FLOOR SURGICAL TELEMETRY UNIT, 525/2       Discharge Date       Discharge Disposition       Discharge Destination                                 Attending Provider: Shane Abarca MD    Allergies: Benadryl [Diphenhydramine], Proventil [Albuterol]    Isolation: None   Infection: MRSA/History Only (12/15/23)   Code Status: CPR    Ht: 175.3 cm (69\")   Wt: 124 kg (272 lb 14.9 oz)    Admission Cmt: None   Principal Problem: Acute on chronic respiratory failure with hypercapnia [J96.22]                   Active Insurance as of 2024       Primary Coverage       Payor Plan Insurance Group Employer/Plan Group    [facility] BY YING [facility] BY YING" [id number]       Payor Plan Address Payor Plan Phone Number Payor Plan Fax Number Effective Dates    [address] [address]   3/1/2024 - None Entered    [facility] [address]         Subscriber Name Subscriber Birth Date Member ID       PRESTON WALLIS 1965 [id number]                     Emergency Contacts        (Rel.) Home Phone Work Phone Mobile Phone    Kami Wallis (Spouse) [phone]    Elaine Zaldivar (Daughter) -- -- [phone]                 History & Physical        SoCharles whitt MD at 04/15/24 0347           [facility]   HISTORY AND PHYSICAL    Patient Name: Preston Wallis  : 1965  MRN: [id number]  Primary Care Physician:  Kimmy Riley MD  Date of admission: 2024    Subjective  Subjective     Chief Complaint: Shortness of breath    HPI:    Preston Wallis is a 58 y.o. male with past medical history of diabetes, hypertension, hyperlipidemia, COPD, CHF, DVT on Eliquis, eczema, chronic anemia, cognitive decline, morbid obesity, and GERD presented to the ED with complaint of shortness of breath.  Patient states that for the last week or so he has been having worsening shortness of breath where he stomatic even at rest along with worsening lower extremity edema orthopnea, generalized weakness and fatigue.  Patient was seen at this facility a few weeks ago with similar complaints and was treated for a CHF exacerbation.  Due to his worsening symptoms patient came to the ED for further evaluation.  In the ED patient was hypoxic on arrival requiring oxygen supplementation via nasal cannula with remaining vitals being within normal limits.  Labs showed he had significant anemia with elevated proBNP level, reduced renal function, ABG showing hypercapnic respiratory failure.  Troponin was elevated but delta was negative.  Chest x-ray showed chronic findings along with increased pulmonary edema.  When asked he denied any recent fevers, chills,  headaches, focal weakness, chest pain, palpitation, abdominal pain, nausea, vomiting, diarrhea, constipation, dysuria, hematuria, hematochezia, melena, or anxiety.  Patient admitted for evaluation and treatment.    Review of Systems   All systems were reviewed and negative except for: as per HPI    Personal History     Past Medical History:   Diagnosis Date    Age-related cognitive decline     Allergic contact dermatitis     Allergies     Anemia     Bronchiectasis with acute lower respiratory infection     Charcot foot due to diabetes mellitus 9/10/2013    Chronic diastolic (congestive) heart failure     Chronic kidney disease     Chronic respiratory failure with hypoxia     Closed supracondylar fracture of femur 1/12/2022    COPD (chronic obstructive pulmonary disease)     Deep vein thrombosis (DVT) of lower extremity associated with air travel 1/13/2023    Dependence on supplemental oxygen     Eczema     Erectile dysfunction     due to organic reasons    Essential (primary) hypertension     Fracture     closed fracture of other tarsal and metatarsal bones    Fracture of proximal humerus 1/13/2023    GERD without esophagitis     High risk medication use     Hypercholesteremia     Hypomagnesemia     Infected stasis ulcer of left lower extremity 1/13/2023    Insomnia     Low back pain     Major depressive disorder     Morbid (severe) obesity due to excess calories     MRSA pneumonia     Muscle weakness     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 without evidence of necrosis     Obstructive sleep apnea (adult) (pediatric)     Other forms of dyspnea     Other long term (current) drug therapy     Other specified noninfective gastroenteritis and colitis     Other spondylosis, lumbar region     Pain in both knees     Paroxysmal atrial fibrillation     Peripheral neuropathy     attributed to type 2 diabetes    Pneumonia, unspecified organism     Polyneuropathy     Rash and other  nonspecific skin eruption     Syncope and collapse     Tachycardia     Tinnitus 1/13/2023    Type 1 diabetes mellitus with diabetic chronic kidney disease     Type 2 diabetes mellitus     Unspecified fall, initial encounter     Urinary retention        Past Surgical History:   Procedure Laterality Date    CHOLECYSTECTOMY      CYSTOSCOPY      FEMUR SURGERY Left     Shravan placed    KNEE SURGERY Left     OTHER SURGICAL HISTORY Left     venous port, REMOVED    PORTACATH PLACEMENT Right     TIBIAL PLATEAU OPEN REDUCTION INTERNAL FIXATION Left 12/22/2023    Procedure: TIBIAL PLATEAU OPEN REDUCTION INTERNAL FIXATION;  Surgeon: Hugo Kline MD;  Location: [facility];  Service: Orthopedics;  Laterality: Left;    TONSILLECTOMY AND ADENOIDECTOMY         Family History: family history includes Asthma in his father; Cancer in his sister; Coronary artery disease in his mother; Diabetes type II in his mother and sister; Hypertension in his mother. Otherwise pertinent FHx was reviewed and not pertinent to current issue.    Social History:  reports that he quit smoking about 31 years ago. His smoking use included cigarettes. He started smoking about 43 years ago. He has a 12 pack-year smoking history. He has been exposed to tobacco smoke. He has never used smokeless tobacco. He reports that he does not currently use alcohol. He reports that he does not use drugs.    Home Medications:  Budeson-Glycopyrrol-Formoterol, Cholecalciferol, DULoxetine, Insulin Lispro, NIFEdipine XL, O2, Sodium Hypochlorite, Zinc, albuterol sulfate HFA, apixaban, aspirin, atorvastatin, bumetanide, busPIRone, calcium citrate, carvedilol, collagenase, dapagliflozin, docusate sodium, exenatide er, famotidine, ferrous gluconate, finasteride, folic acid, insulin detemir, ipratropium-albuterol, levETIRAcetam, magnesium oxide, montelukast, multivitamin with iron, nicotine, ondansetron, silver sulfadiazine, traZODone, and vitamin  C      Allergies:  Allergies   Allergen Reactions    Benadryl [Diphenhydramine] Itching    Proventil [Albuterol] Other (See Comments)     Mouth sores         Objective  Objective     Vitals:   Temp:  [97.5 °F (36.4 °C)-97.9 °F (36.6 °C)] 97.5 °F (36.4 °C)  Heart Rate:  [61-66] 64  Resp:  [20-24] 20  BP: (119-150)/(56-73) 150/73  Flow (L/min):  [3-4] 3  Physical Exam    Constitutional: Awake, alert   Eyes: PERRLA, sclerae anicteric, no conjunctival injection   HENT: NCAT, mucous membranes moist   Neck: Supple, no thyromegaly, no lymphadenopathy, trachea midline   Respiratory: Decreased breath sounds bilaterally, nasal cannula   Cardiovascular: RRR, Tollett murmur, rubs, or gallops, palpable pedal pulses bilaterally   Gastrointestinal: Positive bowel sounds, soft, nontender, nondistended   Musculoskeletal: +2 lower extremity edema, no clubbing or cyanosis to extremities   Psychiatric: Appropriate affect, cooperative   Neurologic: Oriented x 3, strength symmetric in all extremities, Cranial Nerves grossly intact to confrontation, speech clear   Skin: Plantar ulcer on the left    Result Review   Result Review:  I have personally reviewed the results from the time of this admission to 4/15/2024 03:44 EDT and agree with these findings:  [x]  Laboratory list / accordion  []  Microbiology  [x]  Radiology  [x]  EKG/Telemetry   []  Cardiology/Vascular   []  Pathology  []  Old records  []  Other:  Most notable findings include: Hypercapnic respiratory failure on ABG, anemia, elevated troponin with flat delta, elevated reticulocyte count, chest x-ray with pulmonary edema      Assessment & Plan  Assessment / Plan     Brief Patient Summary:  Preston Wallis is a 58 y.o. male with past medical history of diabetes, hypertension, hyperlipidemia, COPD, CHF, DVT on Eliquis, eczema, chronic anemia, cognitive decline, morbid obesity, and GERD presented to the ED with complaint of shortness of breath    Active Hospital Problems:  Active  Hospital Problems    Diagnosis     **Acute on chronic respiratory failure with hypercapnia     Chronic respiratory failure with hypoxia and hypercapnia     Wheezing     Ulcer of left foot, limited to breakdown of skin     Type 2 DM with CKD stage 3 and hypertension     Stage 3b chronic kidney disease     Tobacco abuse, in remission     Paroxysmal atrial fibrillation     Obstructive sleep apnea     COPD exacerbation     Seizures      Plan:     Acute on chronic respiratory failure with hypercapnia  -Admit to telemetry  -Secondary to CHF exacerbation with superimposed COPD  -MILADY  -BiPAP  -Supplemental oxygen as needed  -Chest x-ray reviewed  -BNP above baseline  -Troponin elevated, (chronic).  Will trend  -We will diurese with IV Bumex 2 mg twice daily.  Titrate as needed  -Strict input output  -1.5 L fluid restriction  -Daily weights  -We will consult cardiology if warranted  -IV Solu-Medrol  -DuoNeb 3 times daily and as needed  -Mucinex, Tessalon Perles  -Incentive spirometry  -Adjust home meds if warranted  -Consult pulmonology if warranted  -Supportive care    Diabetes  -Insulin sliding scale  -Levemir at bedtime  -Titrate as needed    HTN  -Currently well controlled  -PRN BP meds  -Resume home meds when available  -Titrate if needed    Hx DVT And Afib  -Resume Eliquis    Foot Ulcers  -Podiatry consulted    Anemia  -Chronic  -Reticulocyte count  -Anemia panel  -Transfuse as needed    Morbid obesity  CAD  COPD  CHF  CKD  MILADY    GI ppx      CODE STATUS: Full code     Admission Status:  I believe this patient meets inpatient status.      Electronically signed by Charles So MD, 04/15/24, 3:44 AM EDT.    Electronically signed by Charles So MD at 04/15/24 0409          Emergency Department Notes        Hilaria Zaldivar, RN at 04/14/24 1910          PT ARRIVED VIA EMS FROM HOME WITH WORSENING SOA. PT ON 4L VIA NC. PT GIVEN DUO-NEB EN ROUTE AND TOOK ONE AROUND 4 PM AT HOME.PT HAS HX OF COPD AND CHF. PT HAS +3  EDEMA AND A URINARY CATHETER.     Electronically signed by Hilaria Zaldivar RN at 04/14/24 1933       Vital Signs (last day)       Date/Time Temp Temp src Pulse Resp BP Patient Position SpO2    04/15/24 0823 98.4 (36.9) Oral 63 20 144/62 Lying 90    04/15/24 0619 -- -- 61 -- -- -- 93    04/15/24 0611 -- -- 62 20 -- -- 94    04/15/24 0309 -- -- 64 -- -- -- 92    04/15/24 0258 -- -- 64 -- -- -- 92    04/15/24 0227 97.5 (36.4) Oral -- 20 150/73 -- 92    04/15/24 0050 97.7 (36.5) -- 64 24 127/56 -- 90    04/14/24 2340 -- -- 63 -- 146/69 Lying 88    04/14/24 2130 -- -- 61 20 148/64 Lying 90    04/14/24 2045 -- -- 62 -- 135/61 -- 92    04/14/24 2025 -- -- 62 20 -- -- 96    04/14/24 2000 -- -- 63 23 142/64 Lying 96    04/14/24 1910 97.9 (36.6) Oral -- -- -- -- --    04/14/24 1908 -- -- 66 23 119/57 Lying 90          Oxygen Therapy (last day)       Date/Time SpO2 Device (Oxygen Therapy) Flow (L/min) Oxygen Concentration (%) ETCO2 (mmHg)    04/15/24 0823 90 NPPV/NIV -- -- --    04/15/24 0742 -- -- -- 35 --    04/15/24 0619 93 nasal cannula 4 -- --    04/15/24 0611 94 NPPV/NIV -- 35 --    04/15/24 0309 92 NPPV/NIV -- 35 --    04/15/24 0307 -- -- -- 35 --    04/15/24 0258 92 nasal cannula;humidified 3 -- --    04/15/24 0233 -- -- -- 35 --    04/15/24 0227 92 -- -- -- --    04/15/24 0219 -- NPPV/NIV -- -- --    04/15/24 0050 90 -- -- -- --    04/14/24 2344 -- -- -- 35 --    04/14/24 2340 88 NPPV/NIV -- -- --    04/14/24 2130 90 NPPV/NIV -- -- --    04/14/24 2050 -- -- -- 32 --    04/14/24 2045 92 -- -- -- --    04/14/24 2025 96 nasal cannula 4 36 --    04/14/24 2000 96 room air 4 -- --    04/14/24 1908 90 nasal cannula 4 -- --          Facility-Administered Medications as of 4/15/2024   Medication Dose Route Frequency Provider Last Rate Last Admin    acetaminophen (TYLENOL) tablet 650 mg  650 mg Oral Q6H PRN Shane Abarca MD        albuterol (PROVENTIL) nebulizer solution 0.083% 2.5 mg/3mL  2.5 mg Nebulization Q4H PRN Rima  MD Shane        aluminum-magnesium hydroxide-simethicone (MAALOX MAX) 400-400-40 MG/5ML suspension 15 mL  15 mL Oral Q6H PRN Shane Abarca MD        apixaban (ELIQUIS) tablet 5 mg  5 mg Oral Q12H Charles So MD        arformoterol (BROVANA) nebulizer solution 15 mcg  15 mcg Nebulization BID - RT Shane Abarca MD        aspirin EC tablet 81 mg  81 mg Oral Daily Charles So MD   81 mg at 04/15/24 0921    atorvastatin (LIPITOR) tablet 20 mg  20 mg Oral Nightly Charles So MD        benzonatate (TESSALON) capsule 100 mg  100 mg Oral TID PRN Shane Abarca MD        sennosides-docusate (PERICOLACE) 8.6-50 MG per tablet 2 tablet  2 tablet Oral BID PRN Charles So MD        And    polyethylene glycol (MIRALAX) packet 17 g  17 g Oral Daily PRN Charles So MD        And    bisacodyl (DULCOLAX) EC tablet 5 mg  5 mg Oral Daily PRN Charles So MD        And    bisacodyl (DULCOLAX) suppository 10 mg  10 mg Rectal Daily PRN Charles So MD        budesonide (PULMICORT) nebulizer solution 0.5 mg  0.5 mg Nebulization BID - RT Shane Abarca MD        bumetanide (BUMEX) injection 2 mg  2 mg Intravenous TID hSane Abarca MD        busPIRone (BUSPAR) tablet 15 mg  15 mg Oral BID PRN Charles So MD        carvedilol (COREG) tablet 25 mg  25 mg Oral Q12H Charles So MD   25 mg at 04/15/24 0922    dextrose (D50W) (25 g/50 mL) IV injection 25 g  25 g Intravenous Q15 Min PRN Charles So MD        dextrose (GLUTOSE) oral gel 15 g  15 g Oral Q15 Min PRN Charles So MD        Diclofenac Sodium (VOLTAREN) 1 % gel 2 g  2 g Topical 4x Daily PRN Shane Abarca MD        DULoxetine (CYMBALTA) DR capsule 30 mg  30 mg Oral Daily Charles So MD   30 mg at 04/15/24 0921    empagliflozin (JARDIANCE) tablet 10 mg  10 mg Oral Daily Shane Abarca MD        famotidine (PEPCID) tablet 40 mg  40 mg Oral QAM Shane Abarca MD        ferrous sulfate tablet 325 mg  325 mg Oral Daily With  Breakfast Shane Abarca MD        finasteride (PROSCAR) tablet 5 mg  5 mg Oral Daily Shane Abarca MD        folic acid (FOLVITE) tablet 1 mg  1 mg Oral Daily Charles So MD   1 mg at 04/15/24 0922    [COMPLETED] furosemide (LASIX) injection 40 mg  40 mg Intravenous Once Lepaniceto, Ari, DO   40 mg at 04/14/24 2143    glucagon (GLUCAGEN) injection 1 mg  1 mg Intramuscular Q15 Min PRN Charles So MD        guaiFENesin (MUCINEX) 12 hr tablet 1,200 mg  1,200 mg Oral Q12H Charles So MD   1,200 mg at 04/15/24 0946    guaiFENesin-dextromethorphan (ROBITUSSIN DM) 100-10 MG/5ML syrup 10 mL  10 mL Oral Q6H PRN Charles So MD   10 mL at 04/15/24 0922    hydrOXYzine (ATARAX) tablet 25 mg  25 mg Oral TID PRN Shane Abarca MD        insulin detemir (LEVEMIR) injection 15 Units  15 Units Subcutaneous Nightly Charles So MD        Insulin Lispro (humaLOG) injection 3-14 Units  3-14 Units Subcutaneous 4x Daily AC & at Bedtime Charles So MD        [COMPLETED] ipratropium-albuterol (DUO-NEB) nebulizer solution 3 mL  3 mL Nebulization Once Lepora, Ari, DO   3 mL at 04/14/24 2025    ipratropium-albuterol (DUO-NEB) nebulizer solution 3 mL  3 mL Nebulization Q6H - RT Charles So MD   3 mL at 04/15/24 0611    levETIRAcetam (KEPPRA) tablet 500 mg  500 mg Oral BID Charles So MD   500 mg at 04/15/24 0922    Lidocaine 4 % 1 patch  1 patch Transdermal Daily PRN Shane Abarca MD        [START ON 4/16/2024] magnesium oxide (MAG-OX) tablet 400 mg  400 mg Oral Daily Shane Abarca MD        melatonin tablet 5 mg  5 mg Oral Nightly PRN Shane Abarca MD        methylPREDNISolone sodium succinate (SOLU-Medrol) injection 40 mg  40 mg Intravenous Daily Charles So MD   40 mg at 04/15/24 0946    montelukast (SINGULAIR) tablet 10 mg  10 mg Oral Nightly Charles So MD        nicotine (NICODERM CQ) 21 MG/24HR patch 1 patch  1 patch Transdermal Daily PRN Shane Abarca MD        NIFEdipine XL  (PROCARDIA XL) 24 hr tablet 30 mg  30 mg Oral QAM Charles So MD   30 mg at 04/15/24 0922    ondansetron (ZOFRAN) injection 4 mg  4 mg Intravenous Q6H PRN Charles So MD        ondansetron (ZOFRAN) injection 4 mg  4 mg Intravenous Q4H PRN Shane Abarca MD        Pharmacy Meds to Bed Consult   Does not apply Continuous PRN Charles So MD        prochlorperazine (COMPAZINE) injection 5 mg  5 mg Intravenous Q6H PRN Shane Abarca MD        sodium chloride 0.9 % flush 10 mL  10 mL Intravenous PRN LepAri moreland, DO   10 mL at 04/14/24 2143    sodium chloride 0.9 % flush 10 mL  10 mL Intravenous Q12H Charles So MD   10 mL at 04/15/24 0923    sodium chloride 0.9 % flush 10 mL  10 mL Intravenous PRN Charles So MD        sodium chloride 0.9 % infusion 40 mL  40 mL Intravenous PRN Charles So MD        sodium chloride nasal spray 2 spray  2 spray Each Nare PRN Shane Abarca MD        traZODone (DESYREL) tablet 50 mg  50 mg Oral Nightly Shane Abarca MD         Lab Results (last 24 hours)       Procedure Component Value Units Date/Time    Procalcitonin [[id number]] Collected: 04/15/24 0306    Specimen: Blood from Hand, Left Updated: 04/15/24 0958    POC Glucose Once [[id number]]  (Abnormal) Collected: 04/15/24 0802    Specimen: Blood Updated: 04/15/24 0803     Glucose 140 mg/dL      Comment: Serial Number: [id number]:  [id number]       Basic Metabolic Panel [[id number]]  (Abnormal) Collected: 04/15/24 0306    Specimen: Blood from Hand, Left Updated: 04/15/24 0408     Glucose 158 mg/dL      BUN 23 mg/dL      Creatinine 1.90 mg/dL      Sodium 136 mmol/L      Potassium 4.4 mmol/L      Chloride 92 mmol/L      CO2 36.7 mmol/L      Calcium 8.5 mg/dL      BUN/Creatinine Ratio 12.1     Anion Gap 7.3 mmol/L      eGFR 40.4 mL/min/1.73     Narrative:      GFR Normal >60  Chronic Kidney Disease <60  Kidney Failure <15      Reticulocytes [[id number]]  (Abnormal) Collected: 04/15/24 0306    Specimen:  Blood from Hand, Left Updated: 04/15/24 0347     Reticulocyte % 2.92 %      Reticulocyte Absolute 0.0803 10*6/mm3     CBC (No Diff) [[id number]]  (Abnormal) Collected: 04/15/24 0306    Specimen: Blood from Hand, Left Updated: 04/15/24 0347     WBC 8.00 10*3/mm3      RBC 2.75 10*6/mm3      Hemoglobin 7.5 g/dL      Hematocrit 25.7 %      MCV 93.5 fL      MCH 27.3 pg      MCHC 29.2 g/dL      RDW 15.9 %      RDW-SD 53.5 fl      MPV 8.8 fL      Platelets 327 10*3/mm3     POC Glucose Once [[id number]]  (Abnormal) Collected: 04/15/24 0218    Specimen: Blood Updated: 04/15/24 0220     Glucose 166 mg/dL      Comment: Serial Number: [id number]:  [id number]       Iron Profile [[id number]]  (Abnormal) Collected: 04/14/24 2148    Specimen: Blood Updated: 04/15/24 0118     Iron 46 mcg/dL      Iron Saturation (TSAT) 17 %      Transferrin 182 mg/dL      TIBC 271 mcg/dL     High Sensitivity Troponin T 2Hr [[id number]]  (Abnormal) Collected: 04/14/24 2148    Specimen: Blood Updated: 04/14/24 2227     HS Troponin T 111 ng/L      Troponin T Delta -7 ng/L     Narrative:      High Sensitive Troponin T Reference Range:  <14.0 ng/L- Negative Female for AMI  <22.0 ng/L- Negative Male for AMI  >=14 - Abnormal Female indicating possible myocardial injury.  >=22 - Abnormal Male indicating possible myocardial injury.   Clinicians would have to utilize clinical acumen, EKG, Troponin, and serial changes to determine if it is an Acute Myocardial Infarction or myocardial injury due to an underlying chronic condition.         Blood Gas, Arterial -With Co-Ox Panel: Yes [[id number]]  (Abnormal) Collected: 04/14/24 2032    Specimen: Arterial Blood Updated: 04/14/24 2034     pH, Arterial 7.265 pH units      pCO2, Arterial 89.5 mm Hg      pO2, Arterial 97.2 mm Hg      HCO3, Arterial 39.7 mmol/L      Base Excess, Arterial 10.7 mmol/L      O2 Saturation, Arterial 96.7 %      Hemoglobin, Blood Gas 8.7 g/dL      Carboxyhemoglobin 0.8 %      Methemoglobin  0.20 %      Oxyhemoglobin 95.7 %      FHHB 3.3 %      Site Arterial: right radial     Modality Cannula - Nasal     FIO2 36 %      Flow Rate 4 lpm      PO2/FIO2 270     Erich's Test Positive     Note already titrated down to 3L    [place] Draw [[id number]] Collected: 04/14/24 1923    Specimen: Blood Updated: 04/14/24 2030    Narrative:      The following orders were created for panel order [place] Draw.  Procedure                               Abnormality         Status                     ---------                               -----------         ------                     Green Top (Gel)[[id number]]                                  Final result               Lavender Top[[id number]]                                     Final result               Gold Top - SST[[id number]]                                   Final result               Light Blue Top[[id number]]                                   Final result                 Please view results for these tests on the individual orders.    Green Top (Gel) [[id number]] Collected: 04/14/24 1923    Specimen: Blood Updated: 04/14/24 2030     Extra Tube Hold for add-ons.     Comment: Auto resulted.       Lavender Top [[id number]] Collected: 04/14/24 1923    Specimen: Blood Updated: 04/14/24 2030     Extra Tube hold for add-on     Comment: Auto resulted       Gold Top - SST [[id number]] Collected: 04/14/24 1923    Specimen: Blood Updated: 04/14/24 2030     Extra Tube Hold for add-ons.     Comment: Auto resulted.       Light Blue Top [[id number]] Collected: 04/14/24 1923    Specimen: Blood Updated: 04/14/24 2030     Extra Tube Hold for add-ons.     Comment: Auto resulted       Single High Sensitivity Troponin T [[id number]]  (Abnormal) Collected: 04/14/24 1923    Specimen: Blood Updated: 04/14/24 2026     HS Troponin T 118 ng/L     Narrative:      High Sensitive Troponin T Reference Range:  <14.0 ng/L- Negative Female for AMI  <22.0 ng/L- Negative Male for AMI  >=14 - Abnormal Female  indicating possible myocardial injury.  >=22 - Abnormal Male indicating possible myocardial injury.   Clinicians would have to utilize clinical acumen, EKG, Troponin, and serial changes to determine if it is an Acute Myocardial Infarction or myocardial injury due to an underlying chronic condition.         Comprehensive Metabolic Panel [[id number]]  (Abnormal) Collected: 04/14/24 1923    Specimen: Blood Updated: 04/14/24 1950     Glucose 234 mg/dL      BUN 23 mg/dL      Creatinine 1.92 mg/dL      Sodium 138 mmol/L      Potassium 4.7 mmol/L      Chloride 93 mmol/L      CO2 38.4 mmol/L      Calcium 8.6 mg/dL      Total Protein 7.2 g/dL      Albumin 3.4 g/dL      ALT (SGPT) 17 U/L      AST (SGOT) 20 U/L      Alkaline Phosphatase 193 U/L      Total Bilirubin 0.2 mg/dL      Globulin 3.8 gm/dL      A/G Ratio 0.9 g/dL      BUN/Creatinine Ratio 12.0     Anion Gap 6.6 mmol/L      eGFR 39.9 mL/min/1.73     Narrative:      GFR Normal >60  Chronic Kidney Disease <60  Kidney Failure <15      BNP [[id number]]  (Normal) Collected: 04/14/24 1923    Specimen: Blood Updated: 04/14/24 1948     proBNP 807.4 pg/mL     Narrative:      This assay is used as an aid in the diagnosis of individuals suspected of having heart failure. It can be used as an aid in the diagnosis of acute decompensated heart failure (ADHF) in patients presenting with signs and symptoms of ADHF to the emergency department (ED). In addition, NT-proBNP of <300 pg/mL indicates ADHF is not likely.    Age Range Result Interpretation  NT-proBNP Concentration (pg/mL:      <50             Positive            >450                   Gray                 300-450                    Negative             <300    50-75           Positive            >900                  Gray                300-900                  Negative            <300      >75             Positive            >1800                  Gray                300-1800                  Negative            <300    Lactic  Acid, Plasma [[id number]]  (Normal) Collected: 04/14/24 1923    Specimen: Blood Updated: 04/14/24 1948     Lactate 0.8 mmol/L     CBC & Differential [[id number]]  (Abnormal) Collected: 04/14/24 1923    Specimen: Blood Updated: 04/14/24 1930    Narrative:      The following orders were created for panel order CBC & Differential.  Procedure                               Abnormality         Status                     ---------                               -----------         ------                     CBC Auto Differential[[id number]]        Abnormal            Final result                 Please view results for these tests on the individual orders.    CBC Auto Differential [[id number]]  (Abnormal) Collected: 04/14/24 1923    Specimen: Blood Updated: 04/14/24 1930     WBC 7.89 10*3/mm3      RBC 2.74 10*6/mm3      Hemoglobin 7.4 g/dL      Hematocrit 26.0 %      MCV 94.9 fL      MCH 27.0 pg      MCHC 28.5 g/dL      RDW 15.7 %      RDW-SD 54.3 fl      MPV 8.3 fL      Platelets 312 10*3/mm3      Neutrophil % 60.2 %      Lymphocyte % 20.5 %      Monocyte % 11.4 %      Eosinophil % 6.5 %      Basophil % 0.5 %      Immature Grans % 0.9 %      Neutrophils, Absolute 4.75 10*3/mm3      Lymphocytes, Absolute 1.62 10*3/mm3      Monocytes, Absolute 0.90 10*3/mm3      Eosinophils, Absolute 0.51 10*3/mm3      Basophils, Absolute 0.04 10*3/mm3      Immature Grans, Absolute 0.07 10*3/mm3      nRBC 0.0 /100 WBC     Blood Culture - Blood, Arm, Left [[id number]] Collected: 04/14/24 1923    Specimen: Blood from Arm, Left Updated: 04/14/24 1927    Blood Culture - Blood, Arm, Right [[id number]] Collected: 04/14/24 1923    Specimen: Blood from Arm, Right Updated: 04/14/24 1927          Imaging Results (Last 24 Hours)       Procedure Component Value Units Date/Time    XR Chest 1 View [[id number]] Collected: 04/14/24 1935     Updated: 04/14/24 1939    Narrative:      AP PORTABLE CHEST     HISTORY:  Shortness of air.     COMPARISON:  2/21/2024      TECHNIQUE:  AP portable chest x-ray.     FINDINGS:  Port-A-Cath tip in the SVC. Central venous catheter tubing on the left  side also has its tip in the SVC, unchanged. Cardiac and mediastinal  contours are stable. There is elevation of the right hemidiaphragm.  There are fairly diffuse chronic bilateral infiltrates. The background  interstitium appears increased currently which may reflect pulmonary  edema. Small amount of right pleural fluid is not excluded. No  pneumothorax.       Impression:      Chronic changes in both lungs are similar to prior. Increased  interstitial opacities bilaterally may reflect superimposed pulmonary  edema. A small amount of right pleural fluid may also be present.     Electronically Signed By-Dr. Edison Guthrie MD On:4/14/2024 7:37 PM             ECG/EMG Results (last 24 hours)       Procedure Component Value Units Date/Time    ECG 12 Lead ED Triage Standing Order; SOA [[id number]] Collected: 04/14/24 1909     Updated: 04/14/24 1910     QT Interval 422 ms      QTC Interval 439 ms     Narrative:      HEART RATE= 65  bpm  RR Interval= 924  ms  DE Interval= 218  ms  P Horizontal Axis= -62  deg  P Front Axis= 36  deg  QRSD Interval= 117  ms  QT Interval= 422  ms  QTcB= 439  ms  QRS Axis= 34  deg  T Wave Axis= 129  deg  - ABNORMAL ECG -  Sinus rhythm  Prolonged DE interval  Nonspecific intraventricular conduction delay  Nonspecific T abnormalities, lateral leads  Borderline ST elevation, anterolateral leads  Electronically Signed By:   Date and Time of Study: 2024-04-14 19:09:06          Orders (active)        Start     Ordered    04/16/24 0900  magnesium oxide (MAG-OX) tablet 400 mg  Daily         04/15/24 0957    04/16/24 0600  Document Pulse Oximetry - On Room Air / Home O2 Level  Daily      Comments: Room Air Oxygen Levels Should Only Be Measured if Patient Oxygen Needs are <4L  Home O2 Oxygen Levels Should Only Be Measured Once Patient Within 2L of Home O2 Baseline  Reapply Oxygen  if O2 Sat Drops Below 88%    04/15/24 0056    04/16/24 0600  Basic Metabolic Panel  Daily       04/15/24 0954    04/16/24 0600  CBC & Differential  Daily       04/15/24 0954    04/16/24 0600  Magnesium  Daily       04/15/24 0954    04/16/24 0600  Phosphorus  Daily       04/15/24 0954    04/15/24 2100  atorvastatin (LIPITOR) tablet 20 mg  Nightly         04/15/24 0356    04/15/24 2100  montelukast (SINGULAIR) tablet 10 mg  Nightly         04/15/24 0356    04/15/24 2100  traZODone (DESYREL) tablet 50 mg  Nightly         04/15/24 0957    04/15/24 1500  bumetanide (BUMEX) injection 2 mg  3 times daily         04/15/24 0954    04/15/24 1045  arformoterol (BROVANA) nebulizer solution 15 mcg  2 Times Daily - RT         04/15/24 0954    04/15/24 1045  budesonide (PULMICORT) nebulizer solution 0.5 mg  2 Times Daily - RT         04/15/24 0954    04/15/24 1045  empagliflozin (JARDIANCE) tablet 10 mg  Daily         04/15/24 0957    04/15/24 1045  famotidine (PEPCID) tablet 40 mg  Every Morning         04/15/24 0957    04/15/24 1045  ferrous sulfate tablet 325 mg  Daily With Breakfast         04/15/24 0957    04/15/24 1045  finasteride (PROSCAR) tablet 5 mg  Daily         04/15/24 0957    04/15/24 1000  Incentive Spirometry  Every 4 Hours While Awake       04/15/24 0954    04/15/24 0959  Bladder Scan  Every Shift       04/15/24 0958    04/15/24 0946  Oscillating Positive Expiratory Pressure (OPEP)  Once         04/15/24 0954    04/15/24 0946  RT to Initiate Bronchopulmonary Hygiene Protocol  Once         04/15/24 0954    04/15/24 0945  melatonin tablet 5 mg  Nightly PRN         04/15/24 0954    04/15/24 0945  aluminum-magnesium hydroxide-simethicone (MAALOX MAX) 400-400-40 MG/5ML suspension 15 mL  Every 6 Hours PRN         04/15/24 0954    04/15/24 0945  nicotine (NICODERM CQ) 21 MG/24HR patch 1 patch  Daily PRN         04/15/24 0954    04/15/24 0945  hydrOXYzine (ATARAX) tablet 25 mg  3 Times Daily PRN         04/15/24 0954     04/15/24 0945  ondansetron (ZOFRAN) injection 4 mg  Every 4 Hours PRN         04/15/24 0954    04/15/24 0945  acetaminophen (TYLENOL) tablet 650 mg  Every 6 Hours PRN         04/15/24 0954    04/15/24 0945  Diclofenac Sodium (VOLTAREN) 1 % gel 2 g  4 Times Daily PRN         04/15/24 0954    04/15/24 0945  Lidocaine 4 % 1 patch  Daily PRN         04/15/24 0954    04/15/24 0945  prochlorperazine (COMPAZINE) injection 5 mg  Every 6 Hours PRN         04/15/24 0954    04/15/24 0945  sodium chloride nasal spray 2 spray  As Needed         04/15/24 0954    04/15/24 0945  benzonatate (TESSALON) capsule 100 mg  3 Times Daily PRN         04/15/24 0954    04/15/24 0945  albuterol (PROVENTIL) nebulizer solution 0.083% 2.5 mg/3mL  Every 4 Hours PRN         04/15/24 0954    04/15/24 0945  Procalcitonin  Once         04/15/24 0954    04/15/24 0945  CT Chest Without Contrast Diagnostic  1 Time Imaging         04/15/24 0954    04/15/24 0943  Ferritin  Once         04/15/24 0954    04/15/24 0943  Code Status and Medical Interventions:  Continuous         04/15/24 0954    04/15/24 0900  methylPREDNISolone sodium succinate (SOLU-Medrol) injection 40 mg  Daily         04/15/24 0350    04/15/24 0900  aspirin EC tablet 81 mg  Daily         04/15/24 0356    04/15/24 0900  carvedilol (COREG) tablet 25 mg  Every 12 Hours Scheduled         04/15/24 0356    04/15/24 0900  DULoxetine (CYMBALTA) DR capsule 30 mg  Daily         04/15/24 0356    04/15/24 0900  folic acid (FOLVITE) tablet 1 mg  Daily         04/15/24 0356    04/15/24 0900  levETIRAcetam (KEPPRA) tablet 500 mg  2 Times Daily        Note to Pharmacy: For tube route administration disperse crushed tablets in 10 mL of water, shake for 5 minutes to dissolve, and administer immediately via enteral feeding tube.    04/15/24 0356    04/15/24 0900  NIFEdipine XL (PROCARDIA XL) 24 hr tablet 30 mg  Every Morning         04/15/24 0356    04/15/24 0800  Oral Care  2 Times Daily       04/15/24  0056    04/15/24 0730  Insulin Lispro (humaLOG) injection 3-14 Units  4 Times Daily Before Meals & Nightly         04/15/24 0406    04/15/24 0700  ipratropium-albuterol (DUO-NEB) nebulizer solution 3 mL  Every 6 Hours - RT         04/15/24 0357    04/15/24 0700  POC Glucose 4x Daily Before Meals & at Bedtime  4 Times Daily Before Meals & at Bedtime      Comments: Complete no more than 45 minutes prior to patient eating      04/15/24 0406    04/15/24 0600  Tobacco Cessation Education  Daily      Comments: Document in Education Activity    04/15/24 0056    04/15/24 0600  Respiratory Treatment Education (MDI / Spacer / Nebulizer)  Daily      Comments: Document in Education Activity    04/15/24 0056    04/15/24 0600  COPD Education  Daily      Comments: Document in Education Activity    04/15/24 0056    04/15/24 0600  Incentive Spirometry  Every 4 Hours While Awake       04/15/24 0056    04/15/24 0500  insulin detemir (LEVEMIR) injection 15 Units  Nightly         04/15/24 0406    04/15/24 0405  dextrose (GLUTOSE) oral gel 15 g  Every 15 Minutes PRN         04/15/24 0406    04/15/24 0405  dextrose (D50W) (25 g/50 mL) IV injection 25 g  Every 15 Minutes PRN         04/15/24 0406    04/15/24 0405  glucagon (GLUCAGEN) injection 1 mg  Every 15 Minutes PRN         04/15/24 0406    04/15/24 0400  Cough / Deep Breathe  Every 4 Hours       04/15/24 0056    04/15/24 0400  Vital Signs  Every 4 Hours       04/15/24 0056    04/15/24 0400  Strict Intake & Output  Every Hour       04/15/24 0349    04/15/24 0355  busPIRone (BUSPAR) tablet 15 mg  2 Times Daily PRN         04/15/24 0356    04/15/24 0350  Inpatient Podiatry Consult  Once        Specialty:  Podiatry  Provider:  Jordon Fuentes DPM    04/15/24 0349    04/15/24 0350  Daily Weights  Daily       04/15/24 0349    04/15/24 0315  apixaban (ELIQUIS) tablet 5 mg  Every 12 Hours         04/15/24 0229    04/15/24 0145  sodium chloride 0.9 % flush 10 mL  Every 12 Hours Scheduled    "      04/15/24 0056    04/15/24 0145  guaiFENesin (MUCINEX) 12 hr tablet 1,200 mg  Every 12 Hours Scheduled         04/15/24 0056    04/15/24 0125  Pharmacy Meds to Bed Consult  Continuous PRN         04/15/24 0125    04/15/24 0123  Wound Ostomy Eval & Treat  Once         04/15/24 0122    04/15/24 0123  Inpatient Case Management  Consult  Once        Provider:  (Not yet assigned)    04/15/24 0122    04/15/24 0123  Inpatient Nutrition Consult  Once        Provider:  (Not yet assigned)    04/15/24 0122    04/15/24 0057  NIPPV-IPPV / BIPAP / CPAP  At Bedtime & As Needed-RT         04/15/24 0056    04/15/24 0057  Respiratory Culture - Sputum, Cough  Once         04/15/24 0056    04/15/24 0057  Weigh Patient  Once         04/15/24 0056    04/15/24 0057  Insert Peripheral IV  Once         04/15/24 0056    04/15/24 0057  Saline Lock & Maintain IV Access  Continuous         04/15/24 0056    04/15/24 0057  Activity - Ad Teri  Until Discontinued         04/15/24 0056    04/15/24 0057  Diet: Cardiac; Healthy Heart (2-3 Na+); Fluid Consistency: Thin (IDDSI 0)  Diet Effective Now         04/15/24 0056    04/15/24 0056  Intake & Output  Every Shift       04/15/24 0056    04/15/24 0056  sodium chloride 0.9 % flush 10 mL  As Needed         04/15/24 0056    04/15/24 0056  sodium chloride 0.9 % infusion 40 mL  As Needed         04/15/24 0056    04/15/24 0056  sennosides-docusate (PERICOLACE) 8.6-50 MG per tablet 2 tablet  2 Times Daily PRN        Placed in \"And\" Linked Group    04/15/24 0056    04/15/24 0056  polyethylene glycol (MIRALAX) packet 17 g  Daily PRN        Placed in \"And\" Linked Group    04/15/24 0056    04/15/24 0056  bisacodyl (DULCOLAX) EC tablet 5 mg  Daily PRN        Placed in \"And\" Linked Group    04/15/24 0056    04/15/24 0056  bisacodyl (DULCOLAX) suppository 10 mg  Daily PRN        Placed in \"And\" Linked Group    04/15/24 0056    04/15/24 0056  ondansetron (ZOFRAN) injection 4 mg  Every 6 Hours PRN  "        04/15/24 0056    04/15/24 0056  guaiFENesin-dextromethorphan (ROBITUSSIN DM) 100-10 MG/5ML syrup 10 mL  Every 6 Hours PRN         04/15/24 0056    04/14/24 2156  Inpatient Hospitalist Consult  Once        Specialty:  Hospitalist  Provider:  Charles So MD    04/14/24 2155 04/14/24 1923  Blood Culture - Blood, Arm, Right  Once         04/14/24 1922 04/14/24 1923  Blood Culture - Blood, Arm, Left  Once         04/14/24 1922 04/14/24 1907  Cardiac Monitoring  Continuous        Comments: Follow Standing Orders As Outlined in Process Instructions (Open Order Report to View Full Instructions)    04/14/24 1906 04/14/24 1907  Insert Peripheral IV  Once         04/14/24 1906 04/14/24 1906  sodium chloride 0.9 % flush 10 mL  As Needed         04/14/24 1906    Unscheduled  Oxygen Therapy- Nasal Cannula; Titrate 1-6 LPM Per SpO2; 90 - 95%  Continuous PRN       04/14/24 1906    Unscheduled  Oxygen Therapy- Nasal Cannula; Titrate 1-6 LPM Per SpO2; 88 - 92%  Continuous PRN       04/15/24 0056    Unscheduled  Follow Hypoglycemia Standing Orders For Blood Glucose <70 & Notify Provider of Treatment  As Needed      Comments: Follow Hypoglycemia Orders As Outlined in Process Instructions (Open Order Report to View Full Instructions)  Notify Provider Any Time Hypoglycemia Treatment is Administered    04/15/24 0406    Unscheduled  Apply Heat To Affected Area  As Needed       04/15/24 0954                     Respiratory Therapy Notes (last 24 hours)        Fanny Ybarra, RT at 04/15/24 0931       Summary:HOME NIV ASTRAL COMPLIANCE                 Mr. Wallis has has a noninvasive ventilator through [employer] for chronic respiratory failure secondary to COPD and MILADY/OHS.     He is not adherent with therapy. He has only used the device 8/90 days. Of those 8 days, only 3 were at least 4 hours in duration.      Electronically signed by Fanny Ybarra, RT at 04/15/24 0923       Talisha Ellison, RRT at 04/15/24 0615           RT EQUIPMENT DEVICE RELATED - SKIN ASSESSMENT    RT Medical Equipment/Device:     NIV Mask:  Under-the-nose   size:  b    Skin Assessment:      Cheek:  Intact  Neck:  Intact  Nose:  Intact    Device Skin Pressure Protection:  Skin-to-device areas padded:  None Required    Nurse Notification:  Kaylin Ellison RRT      Electronically signed by Talisha Ellison RRT at 04/15/24 0616       Talisha Ellison RRT at 04/15/24 0615          Goal Outcome Evaluation:  Plan of Care Reviewed With: patient        Progress: no change  Outcome Evaluation: Patient is on Bipap 14/6 35% tolerating well will try to take off for breaks throughout the day. Patient is awake and alert.                                 Electronically signed by Talisha Ellison RRT at 04/15/24 0615

## 2024-04-16 ENCOUNTER — APPOINTMENT (OUTPATIENT)
Dept: GENERAL RADIOLOGY | Facility: HOSPITAL | Age: 59
DRG: 280 | End: 2024-04-16
Payer: COMMERCIAL

## 2024-04-16 ENCOUNTER — APPOINTMENT (OUTPATIENT)
Facility: HOSPITAL | Age: 59
DRG: 280 | End: 2024-04-16
Payer: COMMERCIAL

## 2024-04-16 LAB
ALBUMIN FLD-MCNC: 1.6 G/DL
ANION GAP SERPL CALCULATED.3IONS-SCNC: 6.7 MMOL/L (ref 5–15)
APPEARANCE FLD: ABNORMAL
BASOPHILS # BLD AUTO: 0.01 10*3/MM3 (ref 0–0.2)
BASOPHILS NFR BLD AUTO: 0.2 % (ref 0–1.5)
BUN SERPL-MCNC: 29 MG/DL (ref 6–20)
BUN/CREAT SERPL: 16.9 (ref 7–25)
CALCIUM SPEC-SCNC: 8.2 MG/DL (ref 8.6–10.5)
CHLORIDE SERPL-SCNC: 94 MMOL/L (ref 98–107)
CHOLESTEROL FLUID: 26 MG/DL
CO2 SERPL-SCNC: 36.3 MMOL/L (ref 22–29)
COLOR FLD: ABNORMAL
CREAT SERPL-MCNC: 1.72 MG/DL (ref 0.76–1.27)
DEPRECATED RDW RBC AUTO: 52.7 FL (ref 37–54)
EGFRCR SERPLBLD CKD-EPI 2021: 45.5 ML/MIN/1.73
EOSINOPHIL # BLD AUTO: 0 10*3/MM3 (ref 0–0.4)
EOSINOPHIL NFR BLD AUTO: 0 % (ref 0.3–6.2)
ERYTHROCYTE [DISTWIDTH] IN BLOOD BY AUTOMATED COUNT: 15.8 % (ref 12.3–15.4)
GLUCOSE BLDC GLUCOMTR-MCNC: 166 MG/DL (ref 70–99)
GLUCOSE BLDC GLUCOMTR-MCNC: 212 MG/DL (ref 70–99)
GLUCOSE BLDC GLUCOMTR-MCNC: 264 MG/DL (ref 70–99)
GLUCOSE BLDC GLUCOMTR-MCNC: 266 MG/DL (ref 70–99)
GLUCOSE FLD-MCNC: 209 MG/DL
GLUCOSE SERPL-MCNC: 224 MG/DL (ref 65–99)
HCT VFR BLD AUTO: 26.5 % (ref 37.5–51)
HGB BLD-MCNC: 7.9 G/DL (ref 13–17.7)
IMM GRANULOCYTES # BLD AUTO: 0.08 10*3/MM3 (ref 0–0.05)
IMM GRANULOCYTES NFR BLD AUTO: 1.2 % (ref 0–0.5)
LDH FLD-CCNC: 87 U/L
LYMPHOCYTES # BLD AUTO: 0.88 10*3/MM3 (ref 0.7–3.1)
LYMPHOCYTES NFR BLD AUTO: 13.5 % (ref 19.6–45.3)
LYMPHOCYTES NFR FLD MANUAL: 88 %
MACROPHAGE FLUID: 1 %
MAGNESIUM SERPL-MCNC: 2.4 MG/DL (ref 1.6–2.6)
MCH RBC QN AUTO: 27.4 PG (ref 26.6–33)
MCHC RBC AUTO-ENTMCNC: 29.8 G/DL (ref 31.5–35.7)
MCV RBC AUTO: 92 FL (ref 79–97)
MONOCYTES # BLD AUTO: 0.39 10*3/MM3 (ref 0.1–0.9)
MONOCYTES NFR BLD AUTO: 6 % (ref 5–12)
MONOCYTES NFR FLD: 8 %
NEUTROPHILS NFR BLD AUTO: 5.16 10*3/MM3 (ref 1.7–7)
NEUTROPHILS NFR BLD AUTO: 79.1 % (ref 42.7–76)
NEUTROPHILS NFR FLD MANUAL: 3 %
NRBC BLD AUTO-RTO: 0 /100 WBC (ref 0–0.2)
NUC CELL # FLD: 503 /MM3
PH FLD: 8 [PH]
PHOSPHATE SERPL-MCNC: 4 MG/DL (ref 2.5–4.5)
PLATELET # BLD AUTO: 334 10*3/MM3 (ref 140–450)
PMV BLD AUTO: 9 FL (ref 6–12)
POTASSIUM SERPL-SCNC: 4.8 MMOL/L (ref 3.5–5.2)
PROT FLD-MCNC: 2.6 G/DL
RBC # BLD AUTO: 2.88 10*6/MM3 (ref 4.14–5.8)
RBC # FLD AUTO: ABNORMAL /MM3
SODIUM SERPL-SCNC: 137 MMOL/L (ref 136–145)
TRIGL FLD-MCNC: 13 MG/DL
WBC NRBC COR # BLD AUTO: 6.52 10*3/MM3 (ref 3.4–10.8)

## 2024-04-16 PROCEDURE — 97161 PT EVAL LOW COMPLEX 20 MIN: CPT

## 2024-04-16 PROCEDURE — 87205 SMEAR GRAM STAIN: CPT | Performed by: FAMILY MEDICINE

## 2024-04-16 PROCEDURE — 89051 BODY FLUID CELL COUNT: CPT | Performed by: INTERNAL MEDICINE

## 2024-04-16 PROCEDURE — 94799 UNLISTED PULMONARY SVC/PX: CPT

## 2024-04-16 PROCEDURE — 63710000001 INSULIN LISPRO (HUMAN) PER 5 UNITS: Performed by: FAMILY MEDICINE

## 2024-04-16 PROCEDURE — 87116 MYCOBACTERIA CULTURE: CPT | Performed by: INTERNAL MEDICINE

## 2024-04-16 PROCEDURE — 87077 CULTURE AEROBIC IDENTIFY: CPT

## 2024-04-16 PROCEDURE — 82945 GLUCOSE OTHER FLUID: CPT | Performed by: INTERNAL MEDICINE

## 2024-04-16 PROCEDURE — 99233 SBSQ HOSP IP/OBS HIGH 50: CPT | Performed by: INTERNAL MEDICINE

## 2024-04-16 PROCEDURE — 94664 DEMO&/EVAL PT USE INHALER: CPT

## 2024-04-16 PROCEDURE — 87205 SMEAR GRAM STAIN: CPT | Performed by: INTERNAL MEDICINE

## 2024-04-16 PROCEDURE — 84478 ASSAY OF TRIGLYCERIDES: CPT | Performed by: INTERNAL MEDICINE

## 2024-04-16 PROCEDURE — 83735 ASSAY OF MAGNESIUM: CPT | Performed by: INTERNAL MEDICINE

## 2024-04-16 PROCEDURE — 32555 ASPIRATE PLEURA W/ IMAGING: CPT | Performed by: INTERNAL MEDICINE

## 2024-04-16 PROCEDURE — 80048 BASIC METABOLIC PNL TOTAL CA: CPT | Performed by: INTERNAL MEDICINE

## 2024-04-16 PROCEDURE — 93923 UPR/LXTR ART STDY 3+ LVLS: CPT

## 2024-04-16 PROCEDURE — 84311 SPECTROPHOTOMETRY: CPT | Performed by: INTERNAL MEDICINE

## 2024-04-16 PROCEDURE — 87075 CULTR BACTERIA EXCEPT BLOOD: CPT | Performed by: INTERNAL MEDICINE

## 2024-04-16 PROCEDURE — 82948 REAGENT STRIP/BLOOD GLUCOSE: CPT | Performed by: FAMILY MEDICINE

## 2024-04-16 PROCEDURE — 83615 LACTATE (LD) (LDH) ENZYME: CPT | Performed by: INTERNAL MEDICINE

## 2024-04-16 PROCEDURE — 93923 UPR/LXTR ART STDY 3+ LVLS: CPT | Performed by: SURGERY

## 2024-04-16 PROCEDURE — 87186 SC STD MICRODIL/AGAR DIL: CPT

## 2024-04-16 PROCEDURE — 82042 OTHER SOURCE ALBUMIN QUAN EA: CPT | Performed by: INTERNAL MEDICINE

## 2024-04-16 PROCEDURE — 25010000002 METHYLPREDNISOLONE PER 40 MG: Performed by: FAMILY MEDICINE

## 2024-04-16 PROCEDURE — 0W9930Z DRAINAGE OF RIGHT PLEURAL CAVITY WITH DRAINAGE DEVICE, PERCUTANEOUS APPROACH: ICD-10-PCS | Performed by: INTERNAL MEDICINE

## 2024-04-16 PROCEDURE — 88108 CYTOPATH CONCENTRATE TECH: CPT | Performed by: INTERNAL MEDICINE

## 2024-04-16 PROCEDURE — 85025 COMPLETE CBC W/AUTO DIFF WBC: CPT | Performed by: INTERNAL MEDICINE

## 2024-04-16 PROCEDURE — 71045 X-RAY EXAM CHEST 1 VIEW: CPT

## 2024-04-16 PROCEDURE — 87077 CULTURE AEROBIC IDENTIFY: CPT | Performed by: FAMILY MEDICINE

## 2024-04-16 PROCEDURE — 87102 FUNGUS ISOLATION CULTURE: CPT | Performed by: INTERNAL MEDICINE

## 2024-04-16 PROCEDURE — 84157 ASSAY OF PROTEIN OTHER: CPT | Performed by: INTERNAL MEDICINE

## 2024-04-16 PROCEDURE — 63710000001 INSULIN DETEMIR PER 5 UNITS: Performed by: FAMILY MEDICINE

## 2024-04-16 PROCEDURE — 25010000002 BUMETANIDE PER 0.5 MG: Performed by: INTERNAL MEDICINE

## 2024-04-16 PROCEDURE — 94660 CPAP INITIATION&MGMT: CPT

## 2024-04-16 PROCEDURE — 84100 ASSAY OF PHOSPHORUS: CPT | Performed by: INTERNAL MEDICINE

## 2024-04-16 PROCEDURE — 87070 CULTURE OTHR SPECIMN AEROBIC: CPT | Performed by: INTERNAL MEDICINE

## 2024-04-16 PROCEDURE — 82948 REAGENT STRIP/BLOOD GLUCOSE: CPT

## 2024-04-16 PROCEDURE — 87070 CULTURE OTHR SPECIMN AEROBIC: CPT | Performed by: FAMILY MEDICINE

## 2024-04-16 PROCEDURE — 97165 OT EVAL LOW COMPLEX 30 MIN: CPT

## 2024-04-16 PROCEDURE — 83986 ASSAY PH BODY FLUID NOS: CPT | Performed by: INTERNAL MEDICINE

## 2024-04-16 PROCEDURE — 25010000002 CEFEPIME PER 500 MG: Performed by: INTERNAL MEDICINE

## 2024-04-16 PROCEDURE — 87206 SMEAR FLUORESCENT/ACID STAI: CPT | Performed by: INTERNAL MEDICINE

## 2024-04-16 RX ORDER — METOLAZONE 5 MG/1
5 TABLET ORAL ONCE
Status: COMPLETED | OUTPATIENT
Start: 2024-04-16 | End: 2024-04-16

## 2024-04-16 RX ADMIN — INSULIN LISPRO 3 UNITS: 100 INJECTION, SOLUTION INTRAVENOUS; SUBCUTANEOUS at 08:05

## 2024-04-16 RX ADMIN — CEFEPIME 2000 MG: 2 INJECTION, POWDER, FOR SOLUTION INTRAVENOUS at 08:07

## 2024-04-16 RX ADMIN — FAMOTIDINE 40 MG: 20 TABLET ORAL at 08:08

## 2024-04-16 RX ADMIN — BUMETANIDE 2 MG: 0.25 INJECTION INTRAMUSCULAR; INTRAVENOUS at 08:07

## 2024-04-16 RX ADMIN — Medication 1 MG: at 08:08

## 2024-04-16 RX ADMIN — BUDESONIDE 0.5 MG: 0.5 INHALANT RESPIRATORY (INHALATION) at 07:34

## 2024-04-16 RX ADMIN — BUMETANIDE 2 MG: 0.25 INJECTION INTRAMUSCULAR; INTRAVENOUS at 15:42

## 2024-04-16 RX ADMIN — ATORVASTATIN CALCIUM 20 MG: 20 TABLET, FILM COATED ORAL at 21:23

## 2024-04-16 RX ADMIN — METHYLPREDNISOLONE SODIUM SUCCINATE 40 MG: 40 INJECTION INTRAMUSCULAR; INTRAVENOUS at 08:07

## 2024-04-16 RX ADMIN — EMPAGLIFLOZIN 10 MG: 10 TABLET, FILM COATED ORAL at 08:08

## 2024-04-16 RX ADMIN — BUMETANIDE 2 MG: 0.25 INJECTION INTRAMUSCULAR; INTRAVENOUS at 21:23

## 2024-04-16 RX ADMIN — TRAZODONE HYDROCHLORIDE 50 MG: 50 TABLET ORAL at 21:23

## 2024-04-16 RX ADMIN — INSULIN DETEMIR 15 UNITS: 100 INJECTION, SOLUTION SUBCUTANEOUS at 21:23

## 2024-04-16 RX ADMIN — FERROUS SULFATE TAB 325 MG (65 MG ELEMENTAL FE) 325 MG: 325 (65 FE) TAB at 12:04

## 2024-04-16 RX ADMIN — DOXYCYCLINE 100 MG: 100 CAPSULE ORAL at 08:08

## 2024-04-16 RX ADMIN — IPRATROPIUM BROMIDE AND ALBUTEROL SULFATE 3 ML: .5; 3 SOLUTION RESPIRATORY (INHALATION) at 13:53

## 2024-04-16 RX ADMIN — CEFEPIME 2000 MG: 2 INJECTION, POWDER, FOR SOLUTION INTRAVENOUS at 00:49

## 2024-04-16 RX ADMIN — APIXABAN 5 MG: 5 TABLET, FILM COATED ORAL at 15:42

## 2024-04-16 RX ADMIN — IPRATROPIUM BROMIDE AND ALBUTEROL SULFATE 3 ML: .5; 3 SOLUTION RESPIRATORY (INHALATION) at 07:34

## 2024-04-16 RX ADMIN — MONTELUKAST 10 MG: 10 TABLET, FILM COATED ORAL at 21:23

## 2024-04-16 RX ADMIN — DOXYCYCLINE 100 MG: 100 CAPSULE ORAL at 21:23

## 2024-04-16 RX ADMIN — APIXABAN 5 MG: 5 TABLET, FILM COATED ORAL at 03:38

## 2024-04-16 RX ADMIN — NIFEDIPINE 30 MG: 30 TABLET, FILM COATED, EXTENDED RELEASE ORAL at 08:08

## 2024-04-16 RX ADMIN — LEVETIRACETAM 500 MG: 500 TABLET, FILM COATED ORAL at 08:07

## 2024-04-16 RX ADMIN — CEFEPIME 2000 MG: 2 INJECTION, POWDER, FOR SOLUTION INTRAVENOUS at 17:02

## 2024-04-16 RX ADMIN — INSULIN LISPRO 8 UNITS: 100 INJECTION, SOLUTION INTRAVENOUS; SUBCUTANEOUS at 17:01

## 2024-04-16 RX ADMIN — CARVEDILOL 25 MG: 25 TABLET, FILM COATED ORAL at 08:08

## 2024-04-16 RX ADMIN — LEVETIRACETAM 500 MG: 500 TABLET, FILM COATED ORAL at 21:23

## 2024-04-16 RX ADMIN — ARFORMOTEROL TARTRATE 15 MCG: 15 SOLUTION RESPIRATORY (INHALATION) at 07:34

## 2024-04-16 RX ADMIN — INSULIN LISPRO 8 UNITS: 100 INJECTION, SOLUTION INTRAVENOUS; SUBCUTANEOUS at 21:23

## 2024-04-16 RX ADMIN — GUAIFENESIN 1200 MG: 600 TABLET ORAL at 21:23

## 2024-04-16 RX ADMIN — FINASTERIDE 5 MG: 5 TABLET, FILM COATED ORAL at 08:08

## 2024-04-16 RX ADMIN — IPRATROPIUM BROMIDE AND ALBUTEROL SULFATE 3 ML: .5; 3 SOLUTION RESPIRATORY (INHALATION) at 00:16

## 2024-04-16 RX ADMIN — Medication 10 ML: at 21:24

## 2024-04-16 RX ADMIN — DULOXETINE HYDROCHLORIDE 30 MG: 30 CAPSULE, DELAYED RELEASE ORAL at 08:07

## 2024-04-16 RX ADMIN — Medication 10 ML: at 08:08

## 2024-04-16 RX ADMIN — Medication 400 MG: at 08:08

## 2024-04-16 RX ADMIN — METOLAZONE 5 MG: 5 TABLET ORAL at 09:33

## 2024-04-16 RX ADMIN — ASPIRIN 81 MG: 81 TABLET, COATED ORAL at 08:08

## 2024-04-16 RX ADMIN — CARVEDILOL 25 MG: 25 TABLET, FILM COATED ORAL at 21:23

## 2024-04-16 RX ADMIN — INSULIN LISPRO 5 UNITS: 100 INJECTION, SOLUTION INTRAVENOUS; SUBCUTANEOUS at 12:04

## 2024-04-16 RX ADMIN — GUAIFENESIN 1200 MG: 600 TABLET ORAL at 08:07

## 2024-04-16 NOTE — PROGRESS NOTES
RT EQUIPMENT DEVICE RELATED - SKIN ASSESSMENT    RT Medical Equipment/Device:     NIV Mask:  Under-the-nose   size:       Skin Assessment:      Cheek:  Intact  Chin:  Intact  Nose:  Intact    Device Skin Pressure Protection:  Pressure points protected    Nurse Notification:  Kaylin Rodriguez, RRT

## 2024-04-16 NOTE — PROCEDURES
Procedure name: ultrasound-guided right-sided thoracentesis     Indication - pleural effusion     This procedural risks were explained to the patient including pneumothorax requiring chest tube placement, significant bleeding requiring surgery, and infection , understanding of the risks and benefits acknowledged by the patient and family and he wish to proceed with the procedure.     Timeout performed     Procedure details  Ultrasound was use to visualize a  collection of pleural fluid, the diaphragm, and collapsed lung. At this point, the skin overlying the rib was anesthetized with 5 mL 1% lidocaine without epinephrine. A thoracentesis needle was then inserted into the pleural cavity just over top of the rib and pleural fluid was aspirated back. At this time the thoracentesis catheter was advanced to the pleural cavity over the needle.  Approximately 800mL of cloudy yellow fluid was removed. Pleural fluid was sent for analysis. Chest x-ray shows reexpansion of lung without pneumothorax.      Patient tolerated procedure well     No immediate complications    Electronically signed by Severiano Carolina MD, 04/16/24, 3:50 PM EDT.

## 2024-04-16 NOTE — PLAN OF CARE
Goal Outcome Evaluation:  Plan of Care Reviewed With: patient        Progress: no change  Outcome Evaluation: patient curently on 4L nasal cannula tolerating fine. Bipap is on standby. Will continue to monitor.

## 2024-04-16 NOTE — PROGRESS NOTES
RT EQUIPMENT DEVICE RELATED - SKIN ASSESSMENT    RT Medical Equipment/Device:     NIV Mask:  Under-the-nose   size:       Skin Assessment:      Cheek:  Intact  Neck:  Intact  Nose:  Intact  Mouth:  Intact    Device Skin Pressure Protection:  Skin-to-device areas padded:  None Required    Nurse Notification:  Kaylin Thomas, RRT

## 2024-04-16 NOTE — THERAPY EVALUATION
Acute Care - Physical Therapy Initial Evaluation  [facility] [place]     Patient Name: Preston Wallsi  : 1965  MRN: [id number]  Today's Date: 2024      Visit Dx:     ICD-10-CM ICD-9-CM   1. Acute respiratory failure with hypercapnia  J96.02 518.81   2. Decreased activities of daily living (ADL)  Z78.9 V49.89   3. Difficulty walking  R26.2 719.7     Patient Active Problem List   Diagnosis    Pneumonia due to COVID-19 virus    Polyneuropathy    Paroxysmal atrial fibrillation    Obstructive sleep apnea    MRSA pneumonia    Low back pain    Chronic diastolic heart failure    Allergies    COPD exacerbation    Chronic anticoagulation    Benign prostatic hyperplasia    Impaired mobility and endurance    Stage 3a chronic kidney disease    Iron deficiency anemia secondary to inadequate dietary iron intake    Vitamin D deficiency    Class 3 severe obesity with serious comorbidity in adult    Lower extremity edema    Elevated alkaline phosphatase level    Venous insufficiency (chronic) (peripheral)    Tobacco abuse, in remission    History of Pseudomonas pneumonia    Chronic dyspnea    Gastroesophageal reflux disease    Bronchiectasis without complication    ERIC (acute kidney injury)    Altered mental status    Hyperlipidemia    Luetscher's syndrome    Neurogenic bladder    Class 1 obesity    Pneumonia due to Pseudomonas species    Seizures    Primary osteoarthritis of left knee    Other constipation    Chronic obstructive pulmonary disease    Type 2 DM with CKD stage 3 and hypertension    Essential hypertension    Stage 3b chronic kidney disease    Annual physical exam    Long-term use of high-risk medication    Personal history of PE (pulmonary embolism)    Encounter for aftercare for healing closed traumatic fracture of left femur    Chronic pain of left knee    Primary osteoarthritis of right knee    Sepsis    Bronchiectasis    Bacterial pneumonia    Anemia    Closed fracture of left tibial plateau    Septic joint     Septic arthritis    Skin ulcer of left heel, limited to breakdown of skin    Ulcer of left foot, limited to breakdown of skin    Left foot pain    Wheezing    Acute on chronic respiratory failure with hypercapnia    Chronic respiratory failure with hypoxia and hypercapnia     Past Medical History:   Diagnosis Date    Age-related cognitive decline     Allergic contact dermatitis     Allergies     Anemia     Bronchiectasis with acute lower respiratory infection     Charcot foot due to diabetes mellitus 9/10/2013    Chronic diastolic (congestive) heart failure     Chronic kidney disease     Chronic respiratory failure with hypoxia     Closed supracondylar fracture of femur 1/12/2022    COPD (chronic obstructive pulmonary disease)     Deep vein thrombosis (DVT) of lower extremity associated with air travel 1/13/2023    Dependence on supplemental oxygen     Eczema     Erectile dysfunction     due to organic reasons    Essential (primary) hypertension     Fracture     closed fracture of other tarsal and metatarsal bones    Fracture of proximal humerus 1/13/2023    GERD without esophagitis     High risk medication use     Hypercholesteremia     Hypomagnesemia     Infected stasis ulcer of left lower extremity 1/13/2023    Insomnia     Low back pain     Major depressive disorder     Morbid (severe) obesity due to excess calories     MRSA pneumonia     Muscle weakness     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 without evidence of necrosis     Obstructive sleep apnea (adult) (pediatric)     Other forms of dyspnea     Other long term (current) drug therapy     Other specified noninfective gastroenteritis and colitis     Other spondylosis, lumbar region     Pain in both knees     Paroxysmal atrial fibrillation     Peripheral neuropathy     attributed to type 2 diabetes    Pneumonia, unspecified organism     Polyneuropathy     Rash and other nonspecific skin eruption     Syncope and  collapse     Tachycardia     Tinnitus 1/13/2023    Type 1 diabetes mellitus with diabetic chronic kidney disease     Type 2 diabetes mellitus     Unspecified fall, initial encounter     Urinary retention      Past Surgical History:   Procedure Laterality Date    CHOLECYSTECTOMY      CYSTOSCOPY      FEMUR SURGERY Left     Shravan placed    KNEE SURGERY Left     OTHER SURGICAL HISTORY Left     venous port, REMOVED    PORTACATH PLACEMENT Right     TIBIAL PLATEAU OPEN REDUCTION INTERNAL FIXATION Left 12/22/2023    Procedure: TIBIAL PLATEAU OPEN REDUCTION INTERNAL FIXATION;  Surgeon: Hugo Kline MD;  Location: [facility] MAIN OR;  Service: Orthopedics;  Laterality: Left;    TONSILLECTOMY AND ADENOIDECTOMY       PT Assessment (Last 12 Hours)       PT Evaluation and Treatment       Row Name 04/16/24 1500          Physical Therapy Time and Intention    Subjective Information complains of  generalized pain all over  -AV     Document Type evaluation  -AV     Mode of Treatment individual therapy;physical therapy  -AV       Row Name 04/16/24 1500          General Information    Patient Profile Reviewed yes  -AV     Prior Level of Function --  Reports assist with ADLs. Reports requires assist x2 for transfers to manual w/c. 2 L continuous O2.  -AV     Equipment Currently Used at Home walker, rolling;wheelchair;oxygen  -AV     Existing Precautions/Restrictions fall;oxygen therapy device and L/min;non-weight bearing  NWB LLE  -AV       Row Name 04/16/24 1500          Living Environment    Current Living Arrangements home  -AV     Home Accessibility stairs to enter home  -AV     People in Home spouse;child(chon), adult  -AV       Row Name 04/16/24 1500          Home Main Entrance    Number of Stairs, Main Entrance other (see comments)  Ramp  -AV       Row Name 04/16/24 1500          Cognition    Orientation Status (Cognition) oriented x 3  -AV       Row Name 04/16/24 1500          Range of Motion (ROM)    Range of Motion  bilateral lower extremities;ROM is WFL  -AV       Row Name 04/16/24 1500          Strength (Manual Muscle Testing)    Strength (Manual Muscle Testing) right lower extremity strength;left lower extremity strength  -AV     Left Lower Extremity Strength hip;knee;ankle  -AV     Hip, Left (Strength) 2+/5  -AV     Knee, Left (Strength) 3-/5  -AV     Ankle, Left (Strength) 3-/5  -AV     Right Lower Extremity Strength hip;knee;ankle  -AV     Hip, Right (Strength) 2-/5  -AV     Knee, Right (Strength) 3/5  -AV     Ankle, Right (Strength) 3/5  -AV       Row Name 04/16/24 1500          Mobility    Extremity Weight-bearing Status left lower extremity  -AV     Left Lower Extremity (Weight-bearing Status) non weight-bearing (NWB)  -AV       Row Name 04/16/24 1500          Bed Mobility    Comment, (Bed Mobility) Patient declined sitting EOB stating he had just returned to bed after sitting up. Patient completed supine to long sit with minimal assist  -AV       Row Name 04/16/24 1500          Safety Issues, Functional Mobility    Impairments Affecting Function (Mobility) balance;endurance/activity tolerance;pain;strength;shortness of breath  -AV       Row Name 04/16/24 1500          Balance    Balance Assessment sitting static balance  -AV     Static Sitting Balance minimal assist  -AV     Position, Sitting Balance supported;long sitting  -AV       Row Name             Wound Left gluteal    Wound - Properties Group Present on Original Admission: Y  -KN Side: Left  -KN Location: gluteal  -KN    Retired Wound - Properties Group Present on Original Admission: Y  -KN Side: Left  -KN Location: gluteal  -KN    Retired Wound - Properties Group Present on Original Admission: Y  -KN Side: Left  -KN Location: gluteal  -KN      Row Name             Wound 12/22/23 1322 Left posterior heel Pressure Injury    Wound - Properties Group Placement Date: 12/22/23  - Placement Time: 1322  -LH Side: Left  -LH Orientation: posterior  -LH Location: heel   -LH Primary Wound Type: Pressure inj  -LH    Retired Wound - Properties Group Placement Date: 12/22/23  -LH Placement Time: 1322  -LH Side: Left  -LH Orientation: posterior  -LH Location: heel  -LH Primary Wound Type: Pressure inj  -LH    Retired Wound - Properties Group Date first assessed: 12/22/23  -LH Time first assessed: 1322  -LH Side: Left  -LH Location: heel  -LH Primary Wound Type: Pressure inj  -LH      Row Name             Wound 02/16/24 1700 Left posterior foot Pressure Injury    Wound - Properties Group Placement Date: 02/16/24  -AG Placement Time: 1700  -AG Side: Left  -AG Orientation: posterior  -AG Location: foot  -AG Primary Wound Type: Pressure inj  -AG    Retired Wound - Properties Group Placement Date: 02/16/24  -AG Placement Time: 1700  -AG Side: Left  -AG Orientation: posterior  -AG Location: foot  -AG Primary Wound Type: Pressure inj  -AG    Retired Wound - Properties Group Date first assessed: 02/16/24  -AG Time first assessed: 1700  -AG Side: Left  -AG Location: foot  -AG Primary Wound Type: Pressure inj  -AG      Row Name 04/16/24 1500          Plan of Care Review    Plan of Care Reviewed With patient;spouse;daughter  -AV     Progress no change  -AV     Outcome Evaluation Patient presents with deficits in balance, endurance, strength, and transfers. Patient will benefit from skilled PT services to address these mobility deficits and decrease risk of falls.  -AV       Row Name 04/16/24 1500          Positioning and Restraints    Pre-Treatment Position in bed  -AV     Post Treatment Position bed  -AV     In Bed fowlers;call light within reach;encouraged to call for assist;with family/caregiver  -AV       Row Name 04/16/24 1500          Therapy Assessment/Plan (PT)    Rehab Potential (PT) fair, will monitor progress closely  -AV     Criteria for Skilled Interventions Met (PT) yes;meets criteria  -AV     Therapy Frequency (PT) daily  -AV     Predicted Duration of Therapy Intervention (PT) 10  days  -AV     Problem List (PT) problems related to;balance;mobility;strength;pain  -AV     Activity Limitations Related to Problem List (PT) unable to transfer safely  -AV       Row Name 04/16/24 1500          PT Evaluation Complexity    History, PT Evaluation Complexity 1-2 personal factors and/or comorbidities  -AV     Examination of Body Systems (PT Eval Complexity) total of 4 or more elements  -AV     Clinical Presentation (PT Evaluation Complexity) stable  -AV     Clinical Decision Making (PT Evaluation Complexity) low complexity  -AV     Overall Complexity (PT Evaluation Complexity) low complexity  -AV       Row Name 04/16/24 1500          Therapy Plan Review/Discharge Plan (PT)    Therapy Plan Review (PT) evaluation/treatment results reviewed;patient  -AV       Row Name 04/16/24 1500          Physical Therapy Goals    Bed Mobility Goal Selection (PT) bed mobility, PT goal 1  -AV     Transfer Goal Selection (PT) transfer, PT goal 1  -AV       Row Name 04/16/24 1500          Bed Mobility Goal 1 (PT)    Activity/Assistive Device (Bed Mobility Goal 1, PT) sit to supine/supine to sit  -AV     [place] Level/Cues Needed (Bed Mobility Goal 1, PT) standby assist  -AV     Time Frame (Bed Mobility Goal 1, PT) 10 days  -AV       Row Name 04/16/24 1500          Transfer Goal 1 (PT)    Activity/Assistive Device (Transfer Goal 1, PT) sit-to-stand/stand-to-sit;bed-to-chair/chair-to-bed;walker, rolling  -AV     [place] Level/Cues Needed (Transfer Goal 1, PT) minimum assist (75% or more patient effort)  -AV     Time Frame (Transfer Goal 1, PT) 10 days  -AV               User Key  (r) = Recorded By, (t) = Taken By, (c) = Cosigned By      Initials Name Provider Type    Carmen Wise RN Registered Nurse    Maria Isabel Sylvester RN Registered Nurse    Cindy Lorenzo, RN Registered Nurse    Andrea Henry, PT Physical Therapist                    Physical Therapy Education       Title: PT OT SLP Therapies  (In Progress)       Topic: Physical Therapy (In Progress)       Point: Mobility training (Done)       Learning Progress Summary             Patient Acceptance, E,TB, VU by AV at 4/16/2024 1509                         Point: Home exercise program (Not Started)       Learner Progress:  Not documented in this visit.              Point: Body mechanics (Done)       Learning Progress Summary             Patient Acceptance, E,TB, VU by AV at 4/16/2024 1509                         Point: Precautions (Done)       Learning Progress Summary             Patient Acceptance, E,TB, VU by AV at 4/16/2024 1509                                         User Key       Initials Effective Dates Name Provider Type Discipline    AV 06/11/21 -  Andrea Ruiz, PT Physical Therapist PT                  PT Recommendation and Plan  Anticipated Discharge Disposition (PT): inpatient rehabilitation facility  Planned Therapy Interventions (PT): balance training, bed mobility training, home exercise program, neuromuscular re-education, strengthening, transfer training  Therapy Frequency (PT): daily  Plan of Care Reviewed With: patient, spouse, daughter  Progress: no change  Outcome Evaluation: Patient presents with deficits in balance, endurance, strength, and transfers. Patient will benefit from skilled PT services to address these mobility deficits and decrease risk of falls.   Outcome Measures       Row Name 04/16/24 1500             How much help from another person do you currently need...    Turning from your back to your side while in flat bed without using bedrails? 3  -AV      Moving from lying on back to sitting on the side of a flat bed without bedrails? 2  -AV      Moving to and from a bed to a chair (including a wheelchair)? 2  -AV      Standing up from a chair using your arms (e.g., wheelchair, bedside chair)? 2  -AV      Climbing 3-5 steps with a railing? 1  -AV      To walk in hospital room? 1  -AV      AM-PAC 6 Clicks Score (PT)  11  -AV      Highest Level of Mobility Goal 4 --> Transfer to chair/commode  -AV         Functional Assessment    Outcome Measure Options AM-PAC 6 Clicks Basic Mobility (PT)  -AV                User Key  (r) = Recorded By, (t) = Taken By, (c) = Cosigned By      Initials Name Provider Type    Andrea Henry, PT Physical Therapist                     Time Calculation:    PT Charges       Row Name 04/16/24 1509             Time Calculation    PT Received On 04/16/24  -AV      PT Goal Re-Cert Due Date 04/25/24  -AV         Untimed Charges    PT Eval/Re-eval Minutes 32  -AV         Total Minutes    Untimed Charges Total Minutes 32  -AV       Total Minutes 32  -AV                User Key  (r) = Recorded By, (t) = Taken By, (c) = Cosigned By      Initials Name Provider Type    Andrea Henry, EMMANUELLE Physical Therapist                  Therapy Charges for Today       Code Description Service Date Service Provider Modifiers Qty    [id number] HC PT EVAL LOW COMPLEXITY 3 4/16/2024 Andrea Ruiz, PT GP 1            PT G-Codes  Outcome Measure Options: AM-PAC 6 Clicks Basic Mobility (PT)  AM-PAC 6 Clicks Score (PT): 11  AM-PAC 6 Clicks Score (OT): 14    Andrea Ruiz PT  4/16/2024

## 2024-04-16 NOTE — THERAPY EVALUATION
Patient Name: Preston Wallis  : 1965    MRN: [id number]                              Today's Date: 2024       Admit Date: 2024    Visit Dx:     ICD-10-CM ICD-9-CM   1. Acute respiratory failure with hypercapnia  J96.02 518.81   2. Decreased activities of daily living (ADL)  Z78.9 V49.89     Patient Active Problem List   Diagnosis    Pneumonia due to COVID-19 virus    Polyneuropathy    Paroxysmal atrial fibrillation    Obstructive sleep apnea    MRSA pneumonia    Low back pain    Chronic diastolic heart failure    Allergies    COPD exacerbation    Chronic anticoagulation    Benign prostatic hyperplasia    Impaired mobility and endurance    Stage 3a chronic kidney disease    Iron deficiency anemia secondary to inadequate dietary iron intake    Vitamin D deficiency    Class 3 severe obesity with serious comorbidity in adult    Lower extremity edema    Elevated alkaline phosphatase level    Venous insufficiency (chronic) (peripheral)    Tobacco abuse, in remission    History of Pseudomonas pneumonia    Chronic dyspnea    Gastroesophageal reflux disease    Bronchiectasis without complication    ERIC (acute kidney injury)    Altered mental status    Hyperlipidemia    Luetscher's syndrome    Neurogenic bladder    Class 1 obesity    Pneumonia due to Pseudomonas species    Seizures    Primary osteoarthritis of left knee    Other constipation    Chronic obstructive pulmonary disease    Type 2 DM with CKD stage 3 and hypertension    Essential hypertension    Stage 3b chronic kidney disease    Annual physical exam    Long-term use of high-risk medication    Personal history of PE (pulmonary embolism)    Encounter for aftercare for healing closed traumatic fracture of left femur    Chronic pain of left knee    Primary osteoarthritis of right knee    Sepsis    Bronchiectasis    Bacterial pneumonia    Anemia    Closed fracture of left tibial plateau    Septic joint    Septic arthritis    Skin ulcer of left heel,  limited to breakdown of skin    Ulcer of left foot, limited to breakdown of skin    Left foot pain    Wheezing    Acute on chronic respiratory failure with hypercapnia    Chronic respiratory failure with hypoxia and hypercapnia     Past Medical History:   Diagnosis Date    Age-related cognitive decline     Allergic contact dermatitis     Allergies     Anemia     Bronchiectasis with acute lower respiratory infection     Charcot foot due to diabetes mellitus 9/10/2013    Chronic diastolic (congestive) heart failure     Chronic kidney disease     Chronic respiratory failure with hypoxia     Closed supracondylar fracture of femur 1/12/2022    COPD (chronic obstructive pulmonary disease)     Deep vein thrombosis (DVT) of lower extremity associated with air travel 1/13/2023    Dependence on supplemental oxygen     Eczema     Erectile dysfunction     due to organic reasons    Essential (primary) hypertension     Fracture     closed fracture of other tarsal and metatarsal bones    Fracture of proximal humerus 1/13/2023    GERD without esophagitis     High risk medication use     Hypercholesteremia     Hypomagnesemia     Infected stasis ulcer of left lower extremity 1/13/2023    Insomnia     Low back pain     Major depressive disorder     Morbid (severe) obesity due to excess calories     MRSA pneumonia     Muscle weakness     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 without evidence of necrosis     Obstructive sleep apnea (adult) (pediatric)     Other forms of dyspnea     Other long term (current) drug therapy     Other specified noninfective gastroenteritis and colitis     Other spondylosis, lumbar region     Pain in both knees     Paroxysmal atrial fibrillation     Peripheral neuropathy     attributed to type 2 diabetes    Pneumonia, unspecified organism     Polyneuropathy     Rash and other nonspecific skin eruption     Syncope and collapse     Tachycardia     Tinnitus 1/13/2023     Type 1 diabetes mellitus with diabetic chronic kidney disease     Type 2 diabetes mellitus     Unspecified fall, initial encounter     Urinary retention      Past Surgical History:   Procedure Laterality Date    CHOLECYSTECTOMY      CYSTOSCOPY      FEMUR SURGERY Left     Shravan placed    KNEE SURGERY Left     OTHER SURGICAL HISTORY Left     venous port, REMOVED    PORTACATH PLACEMENT Right     TIBIAL PLATEAU OPEN REDUCTION INTERNAL FIXATION Left 12/22/2023    Procedure: TIBIAL PLATEAU OPEN REDUCTION INTERNAL FIXATION;  Surgeon: Hugo Kline MD;  Location: [facility] OR;  Service: Orthopedics;  Laterality: Left;    TONSILLECTOMY AND ADENOIDECTOMY        General Information       Row Name 04/16/24 1212          OT Time and Intention    Document Type evaluation  -     Mode of Treatment individual therapy;occupational therapy  -       Row Name 04/16/24 1212          General Information    Patient Profile Reviewed yes  -LF     Prior Level of Function --  Assist with ADLs, ambulated short distances with RW but mostly uses manual w/c that he self propels, has a walk-in shower with chair/grab bars, has a BSC, straight caths, sits to groom, and wears 2L home O2.  -     Existing Precautions/Restrictions fall;non-weight bearing  LLE  -     Barriers to Rehab none identified  -       Row Name 04/16/24 1212          Occupational Profile    Reason for Services/Referral (Occupational Profile) Patient is a 58 year old male admitted to [facility] for shortness of breath on April 14th, 2024. Occupational therapy consulted due to recent decline in ADLs/functional transfers. No previous occupational therapy services for current condition.  -       Row Name 04/16/24 1212          Living Environment    People in Home spouse;child(chon), adult;grandchild(chon)  -       Row Name 04/16/24 1212          Home Main Entrance    Number of Stairs, Main Entrance none  Ramp  -       Row Name 04/16/24 1212           Cognition    Orientation Status (Cognition) oriented x 4  -[facility] Name 04/16/24 1212          Safety Issues, Functional Mobility    Impairments Affecting Function (Mobility) balance;endurance/activity tolerance;pain;shortness of breath  -               User Key  (r) = Recorded By, (t) = Taken By, (c) = Cosigned By      Initials Name Provider Type     Lias Miranda OT Occupational Therapist                     Mobility/ADL's       Row Name 04/16/24 1218          Bed Mobility    Bed Mobility supine-sit  -     Supine-Sit [place] (Bed Mobility) minimum assist (75% patient effort)  -     Bed Mobility, Safety Issues decreased use of arms for pushing/pulling;decreased use of legs for bridging/pushing  -     Comment, (Bed Mobility) Patient declined further functional transfers d/t pain.  -[facility] Name 04/16/24 1218          Activities of Daily Living    BADL Assessment/Intervention bathing;upper body dressing;lower body dressing;grooming;feeding;toileting  -LF       Row Name 04/16/24 1218          Bathing Assessment/Intervention    [place] Level (Bathing) bathing skills;upper body;minimum assist (75% patient effort);lower body;maximum assist (25% patient effort);dependent (less than 25% patient effort)  -[facility] Name 04/16/24 1218          Upper Body Dressing Assessment/Training    [place] Level (Upper Body Dressing) upper body dressing skills;minimum assist (75% patient effort)  -LF       Row Name 04/16/24 1218          Lower Body Dressing Assessment/Training    [place] Level (Lower Body Dressing) lower body dressing skills;maximum assist (25% patient effort);dependent (less than 25% patient effort)  -LF       Row Name 04/16/24 1218          Grooming Assessment/Training    [place] Level (Grooming) grooming skills;standby assist  -LF       Row Name 04/16/24 1218          Self-Feeding Assessment/Training    [place] Level (Feeding) feeding skills;set up  -        Row Name 04/16/24 1218          Toileting Assessment/Training    [place] Level (Toileting) toileting skills;maximum assist (25% patient effort);dependent (less than 25% patient effort)  -LF               User Key  (r) = Recorded By, (t) = Taken By, (c) = Cosigned By      Initials Name Provider Type     Lisa Miranda OT Occupational Therapist                   Obj/Interventions       Row Name 04/16/24 1220          Sensory Assessment (Somatosensory)    Sensory Assessment (Somatosensory) UE sensation intact  -LF       Row Name 04/16/24 1220          Vision Assessment/Intervention    Visual Impairment/Limitations WFL  -LF       Row Name 04/16/24 1220          Range of Motion Comprehensive    Comment, General Range of Motion ~45° active right shoulder flexion, full AROM distally, and throughout LUE  -LF       Row Name 04/16/24 1220          Strength Comprehensive (MMT)    Comment, General Manual Muscle Testing (MMT) Assessment 2+/5 right a.delt/lower trap, 4/5 distally, and 4+/5 throughout LUE.  -LF       Row Name 04/16/24 1220          Motor Skills    Motor Skills coordination;functional endurance  -LF     Coordination bilateral;upper extremity;WFL  -LF     Functional Endurance Poor+  -LF       Row Name 04/16/24 1220          Balance    Balance Assessment sitting dynamic balance  -LF     Dynamic Sitting Balance supervision  -LF     Position, Sitting Balance unsupported;sitting edge of bed  -LF               User Key  (r) = Recorded By, (t) = Taken By, (c) = Cosigned By      Initials Name Provider Type    LF Lisa Miranda OT Occupational Therapist                   Goals/Plan       Row Name 04/16/24 1222          Bed Mobility Goal 1 (OT)    Activity/Assistive Device (Bed Mobility Goal 1, OT) bed mobility activities, all  -LF     [place] Level/Cues Needed (Bed Mobility Goal 1, OT) modified independence  -LF     Time Frame (Bed Mobility Goal 1, OT) long term goal (LTG);10 days  -       Row Name  04/16/24 1222          Transfer Goal 1 (OT)    Activity/Assistive Device (Transfer Goal 1, OT) transfers, all  -LF     [place] Level/Cues Needed (Transfer Goal 1, OT) minimum assist (75% or more patient effort)  -LF     Time Frame (Transfer Goal 1, OT) long term goal (LTG);10 days  -       Row Name 04/16/24 1222          Bathing Goal 1 (OT)    Activity/Device (Bathing Goal 1, OT) bathing skills, all  -LF     [place] Level/Cues Needed (Bathing Goal 1, OT) standby assist  -LF     Time Frame (Bathing Goal 1, OT) long term goal (LTG);10 days  -LF       Row Name 04/16/24 1222          Dressing Goal 1 (OT)    Activity/Device (Dressing Goal 1, OT) dressing skills, all  -LF     [place]/Cues Needed (Dressing Goal 1, OT) standby assist  -LF     Time Frame (Dressing Goal 1, OT) long term goal (LTG);10 days  -       Row Name 04/16/24 1222          Toileting Goal 1 (OT)    Activity/Device (Toileting Goal 1, OT) toileting skills, all  -LF     [place] Level/Cues Needed (Toileting Goal 1, OT) minimum assist (75% or more patient effort)  -LF     Time Frame (Toileting Goal 1, OT) long term goal (LTG);10 days  -       Row Name 04/16/24 1222          Problem Specific Goal 1 (OT)    Problem Specific Goal 1 (OT) Patient will demonstrate good- endurance to support ADLs/functional transfers.  -LF     Time Frame (Problem Specific Goal 1, OT) long term goal (LTG);10 days  -       Row Name 04/16/24 1222          Therapy Assessment/Plan (OT)    Planned Therapy Interventions (OT) activity tolerance training;BADL retraining;functional balance retraining;occupation/activity based interventions;patient/caregiver education/training;strengthening exercise;transfer/mobility retraining  -               User Key  (r) = Recorded By, (t) = Taken By, (c) = Cosigned By      Initials Name Provider Type    LF Lisa Miranda, OT Occupational Therapist                   Clinical Impression       Row Name 04/16/24 1221           Pain Assessment    Additional Documentation Pain Scale: FACES Pre/Post-Treatment (Group)  -       Row Name 04/16/24 1221          Pain Scale: FACES Pre/Post-Treatment    Pain: FACES Scale, Pretreatment 2-->hurts little bit  -LF     Posttreatment Pain Rating 4-->hurts little more  -LF     Pain Location generalized  -       Row Name 04/16/24 1221          Plan of Care Review    Plan of Care Reviewed With patient;spouse;daughter  -     Progress no change  -     Outcome Evaluation Patient presents with limitations in self-care, functional transfers, balance, and endurance. He would benefit from continued skilled occupational therapy services to maximize independence with ADLs/functional transfers.  -       Row Name 04/16/24 1221          Therapy Assessment/Plan (OT)    Patient/Family Therapy Goal Statement (OT) To maximize independence.  -     Rehab Potential (OT) good, to achieve stated therapy goals  -     Criteria for Skilled Therapeutic Interventions Met (OT) yes;meets criteria;skilled treatment is necessary  -     Therapy Frequency (OT) 5 times/wk  -       Row Name 04/16/24 1221          Therapy Plan Review/Discharge Plan (OT)    Anticipated Discharge Disposition (OT) inpatient rehabilitation facility  -       Row Name 04/16/24 1221          Vital Signs    O2 Delivery Pre Treatment nasal cannula  -     O2 Delivery Intra Treatment nasal cannula  -     O2 Delivery Post Treatment nasal cannula  -       Row Name 04/16/24 1221          Positioning and Restraints    Pre-Treatment Position in bed  -     Post Treatment Position bed  -     In Bed sitting EOB;call light within reach;encouraged to call for assist;with family/caregiver  -               User Key  (r) = Recorded By, (t) = Taken By, (c) = Cosigned By      Initials Name Provider Type     Lisa Miranda, OT Occupational Therapist                   Outcome Measures       Row Name 04/16/24 1223          How much help from another  is currently needed...    Putting on and taking off regular lower body clothing? 1  -LF     Bathing (including washing, rinsing, and drying) 2  -LF     Toileting (which includes using toilet bed pan or urinal) 1  -LF     Putting on and taking off regular upper body clothing 3  -LF     Taking care of personal grooming (such as brushing teeth) 3  -LF     Eating meals 4  -LF     AM-PAC 6 Clicks Score (OT) 14  -LF       Row Name 04/16/24 0800          How much help from another person do you currently need...    Turning from your back to your side while in flat bed without using bedrails? 2  -FW     Moving from lying on back to sitting on the side of a flat bed without bedrails? 2  -FW     Moving to and from a bed to a chair (including a wheelchair)? 2  -FW     Standing up from a chair using your arms (e.g., wheelchair, bedside chair)? 2  -FW     Climbing 3-5 steps with a railing? 1  -FW     To walk in hospital room? 1  -FW     AM-PAC 6 Clicks Score (PT) 10  -FW     Highest Level of Mobility Goal 4 --> Transfer to chair/commode  -FW       Row Name 04/16/24 1223          Functional Assessment    Outcome Measure Options AM-PAC 6 Clicks Daily Activity (OT);Optimal Instrument  -LF       Row Name 04/16/24 1223          Optimal Instrument    Optimal Instrument Optimal - 3  -LF     Bending/Stooping 4  -LF     Standing 5  -LF     Reaching 3  -LF     From the list, choose the 3 activities you would most like to be able to do without any difficulty Bending/stooping;Standing;Reaching  -LF     Total Score Optimal - 3 12  -LF               User Key  (r) = Recorded By, (t) = Taken By, (c) = Cosigned By      Initials Name Provider Type    LF Lisa Miranda OT Occupational Therapist    FW Yuliet Alejandra, RN Registered Nurse                    Occupational Therapy Education       Title: PT OT SLP Therapies (In Progress)       Topic: Occupational Therapy (In Progress)       Point: ADL training (In Progress)       Description:    Instruct learner(s) on proper safety adaptation and remediation techniques during self care or transfers.   Instruct in proper use of assistive devices.                  Learning Progress Summary             Patient Acceptance, E,TB, NR by  at 4/16/2024 1223                         Point: Precautions (In Progress)       Description:   Instruct learner(s) on prescribed precautions during self-care and functional transfers.                  Learning Progress Summary             Patient Acceptance, E,TB, NR by  at 4/16/2024 1223                         Point: Body mechanics (In Progress)       Description:   Instruct learner(s) on proper positioning and spine alignment during self-care, functional mobility activities and/or exercises.                  Learning Progress Summary             Patient Acceptance, E,TB, NR by  at 4/16/2024 1223                                         User Key       Initials Effective Dates Name Provider Type Discipline     06/16/21 -  Lisa Miranda OT Occupational Therapist OT                  OT Recommendation and Plan  Planned Therapy Interventions (OT): activity tolerance training, BADL retraining, functional balance retraining, occupation/activity based interventions, patient/caregiver education/training, strengthening exercise, transfer/mobility retraining  Therapy Frequency (OT): 5 times/wk  Plan of Care Review  Plan of Care Reviewed With: patient, spouse, daughter  Progress: no change  Outcome Evaluation: Patient presents with limitations in self-care, functional transfers, balance, and endurance. He would benefit from continued skilled occupational therapy services to maximize independence with ADLs/functional transfers.     Time Calculation:   Evaluation Complexity (OT)  Review Occupational Profile/Medical/Therapy History Complexity: brief/low complexity  Assessment, Occupational Performance/Identification of Deficit Complexity: 3-5 performance deficits  Clinical  Decision Making Complexity (OT): problem focused assessment/low complexity  Overall Complexity of Evaluation (OT): low complexity     Time Calculation- OT       Row Name 04/16/24 1224             Time Calculation- OT    OT Received On 04/16/24  -LF      OT Goal Re-Cert Due Date 04/25/24  -LF         Untimed Charges    OT Eval/Re-eval Minutes 35  -LF         Total Minutes    Untimed Charges Total Minutes 35  -LF       Total Minutes 35  -LF                User Key  (r) = Recorded By, (t) = Taken By, (c) = Cosigned By      Initials Name Provider Type    LF Lisa Miranda OT Occupational Therapist                  Therapy Charges for Today       Code Description Service Date Service Provider Modifiers Qty    [id number] HC OT EVAL LOW COMPLEXITY 3 4/16/2024 Lisa Miranda OT GO 1                 Lisa Miranda OT  4/16/2024

## 2024-04-16 NOTE — PLAN OF CARE
Goal Outcome Evaluation:  Plan of Care Reviewed With: patient        Progress: no change  Outcome Evaluation: Patient wore BiPAP 14/6 all night with no issues. When off BiPAP he is on 4L NC.

## 2024-04-16 NOTE — PLAN OF CARE
Goal Outcome Evaluation:  Plan of Care Reviewed With: patient, spouse, daughter        Progress: no change  Outcome Evaluation: Patient presents with limitations in self-care, functional transfers, balance, and endurance. He would benefit from continued skilled occupational therapy services to maximize independence with ADLs/functional transfers.      Anticipated Discharge Disposition (OT): inpatient rehabilitation facility

## 2024-04-16 NOTE — PROGRESS NOTES
Pulmonary / Critical Care Progress Note      Patient Name: Preston Wallis  : 1965  MRN: [id number]  Primary Care Physician:  Kimmy Riley MD  Date of admission: 2024    Subjective   Subjective   Follow-up for Acute on chronic hypercapnic respiratory failure requiring NIPPV, CHF exacerbation    Over the last 24 hours, remains on Eliquis for A-fib.  Remains on Brovana, Pulmicort, DuoNebs.  Remains on cefepime and doxycycline courses.  Niki on Solu-Medrol 40 mg IV daily.  Remains on Jardiance    No acute events overnight.    This morning,  On 2 L nasal cannula  Lying in bed  Patient is improving clinically  Patient reports feeling better than yesterday  Denies any chest pain or chest tightness  No fever or chills  Nonproductive cough  Dyspnea improving  Diuresing well  2.6 L urine output    Objective   Objective     Vitals:   Temp:  [97.5 °F (36.4 °C)-99 °F (37.2 °C)] 98.6 °F (37 °C)  Heart Rate:  [63-81] 78  Resp:  [18-22] 20  BP: (124-153)/(46-62) 124/52  Physical Exam   Vital Signs Reviewed   General: WDWN, Alert, NAD.  Chronically ill-appearing male, lying in bed  HEENT:  PERRL, EOMI.  OP, nares clear, no sinus tenderness  Neck:  Supple, no JVD, no thyromegaly  Chest: Improved aeration with bibasilar crackles, on 2 L nasal cannula  CV: RRR, no MGR, pulses 2+, equal.  Abd:  Soft, NT, ND, + BS, no HSM, obese  EXT:  no clubbing, no cyanosis, anasarca, BLE edema  Neuro:  A&Ox3, CN grossly intact, no focal deficits.  Skin: No rashes or lesions noted      Result Review    Result Review:  I have personally reviewed the results from the time of this admission to 2024 07:33 EDT and agree with these findings:  [x]  Laboratory  [x]  Microbiology  [x]  Radiology  []  EKG/Telemetry   []  Cardiology/Vascular   []  Pathology  []  Old records  []  Other:  Most notable findings include:         Lab 24  0353 04/15/24  0306 24  1923   WBC 6.52 8.00 7.89   HEMOGLOBIN 7.9* 7.5* 7.4*   HEMATOCRIT  26.5* 25.7* 26.0*   PLATELETS 334 327 312   SODIUM 137 136 138   POTASSIUM 4.8 4.4 4.7   CHLORIDE 94* 92* 93*   CO2 36.3* 36.7* 38.4*   BUN 29* 23* 23*   CREATININE 1.72* 1.90* 1.92*   GLUCOSE 224* 158* 234*   CALCIUM 8.2* 8.5* 8.6   PHOSPHORUS 4.0  --   --    TOTAL PROTEIN  --   --  7.2   ALBUMIN  --   --  3.4*   GLOBULIN  --   --  3.8     CT Chest Without Contrast Diagnostic    Result Date: 4/15/2024  CT CHEST WO CONTRAST DIAGNOSTIC-  Date of Exam: 4/15/2024 4:57 PM  Indication: soa, possible pna, plueral effusion eval,  pmhx copd/chf/morb obesity/laura; J96.02-Acute respiratory failure with hypercapnia.  Comparison: Chest x-ray 4/14/2024, CT chest 12/9/2023  Technique: Axial CT images were obtained of the chest without contrast administration.  Reconstructed coronal and sagittal images were also obtained. Automated exposure control and iterative construction methods were used.   Findings:  Hilum and Mediastinum: Moderate coronary artery atherosclerotic disease.  No pericardial effusion.  Unremarkable thoracic aorta and pulmonary arteries. There is a right-sided port. The tip terminates at the superior vena cava. There is a residual port in the left soft tissues and internal jugular terminating at the cavoatrial junction. Cardiomegaly. Precarinal lymph node is enlarged. On image 121 it measures 1.3 cm in short axis. This is similar to previous exam.  Lung Parenchyma and Pleura: There are moderate bilateral pleural effusions. These are new. There is bronchiectasis which is varicose. There is bibasilar airspace opacity right greater than left pneumonia favored over atelectasis. There is scattered geographic groundglass opacity. There is new consolidation in the lateral right upper lobe. In the lingula there is a noncalcified nodule on image #217. It measures 9 mm. There is surrounding groundglass opacity. This is new.  Upper Abdomen: Unremarkable.  Soft tissues: Unremarkable.  Osseous structures: No aggressive focal  lytic or sclerotic osseous lesions. Osteopenia.      Impression: 1.  Moderate bilateral pleural effusions. Bibasilar opacity right greater than left. Pneumonia most likely. 2.  Noncalcified nodule in the lingula measuring 9 mm. Scattered areas of groundglass with mild septal thickening likely due to superimposed pulmonary edema. 3.  Severe varicose and cystic bronchiectasis. 4.  New opacity in the lateral right upper lobe atelectasis versus pneumonia. 5.  A follow-up CT chest in 3 months time is recommended to evaluate for the resolution of the noncalcified nodules and parenchymal opacities.  Electronically Signed By-Francine Garner MD On:4/15/2024 5:19 PM         Assessment & Plan   Assessment / Plan     Active Hospital Problems:  Active Hospital Problems    Diagnosis     **Acute on chronic respiratory failure with hypercapnia     Chronic respiratory failure with hypoxia and hypercapnia     Wheezing     Ulcer of left foot, limited to breakdown of skin     Type 2 DM with CKD stage 3 and hypertension     Stage 3b chronic kidney disease     Tobacco abuse, in remission     Paroxysmal atrial fibrillation     Obstructive sleep apnea     COPD exacerbation     Seizures      Impression:  Acute on chronic hypercapnic respiratory failure requiring NIPPV  Severe COPD with exacerbation, FEV1 26%  Obesity hypoventilation syndrome  NSTEMI type II from demand ischemia  Acute on chronic decompensated congestive diastolic heart failure  Acute cardiogenic pulmonary edema  Bilateral pleural effusions  9 mm lingular lung nodule  Anasarca  Anemia  CKD  Hypocalcemia  Medical noncompliance of NIV  History of MILADY  Tobacco abuse in remission     Plan:  -Continue to wean supplemental oxygen to maintain SpO2 greater than 90%.  Respiratory status remains tenuous given anasarca and FEV1 of 26%  -4/14 CXR demonstrating chronic changes in both lungs similar to prior exam with increased interstitial opacities bilaterally reflecting superimposed  pulmonary edema.  Small amount of right pleural effusion may also be present.  -4/15 chest CT demonstrating moderate bilateral pleural effusions.  Also has a 9 mm lung nodule in the lingula.  Recommend repeating noncontrast chest CT in 3 months to ensure stability  -Ultrasound utilized at bedside to evaluate pleural effusions.  Discussed proceding forward with thoracentesis. Discussed the risks of the procedure with the patient including pneumothorax, hemothorax, bleeding, hypoxia and death. The patient recognizes these findings, acknowledges these findings and is agreeable to the procedure. Obtained consent for right sided thoracentesis 4/16/24  -900 mL of straw-colored fluid removed, collected, and sent to lab for cultures  -Procedural CXR pending  -Micro negative to date  -Continue Brovana, Pulmicort, DuoNebs  -Continue Solu-Medrol 40 mg IV daily  -Continue bronchopulmonary bronchodilator protocols  -Continue Jardiance  -Continue Bumex 2 mg IV 3 times daily.  Give metolazone 5 mg x 1  -Continue to monitor renal panel and electrolytes.  Replace electrolytes as necessary  -Continue Eliquis for A-fib  -PT/OT on board.  Appreciate assistance  -RT  on board.  Appreciate assistan  -Follow-up with us as an outpatient 1 to 2 weeks after discharge    DVT prophylaxis:  Medical DVT prophylaxis orders are present.    CODE STATUS:   Code Status (Patient has no pulse and is not breathing): CPR (Attempt to Resuscitate)  Medical Interventions (Patient has pulse or is breathing): Full Support    Labs, managing, and medications personally reviewed  Discussed with primary and patient    Electronically signed by CON Sampson, 04/16/24, 7:33 AM EDT.    I, Dr. Severiano Carolina, have spent more than 50% of the total time managing the patient in this encounter today.  This included personally reviewing all pertinent labs, imaging, microbiology and documentation. Also discussing the case with the patient and any  available family, the admitting physician and any available ancillary staff.    Electronically signed by Severiano Carolina MD, 04/16/24, 3:49 PM EDT.

## 2024-04-16 NOTE — PLAN OF CARE
Goal Outcome Evaluation:  Plan of Care Reviewed With: patient        Progress: no change  Outcome Evaluation: patient wore bipap. patient stated he straight cath at home and requested to be straight cath. MD notified; see orders. straight cath with 950cc out.

## 2024-04-16 NOTE — PLAN OF CARE
Goal Outcome Evaluation:  Plan of Care Reviewed With: patient, spouse, daughter        Progress: no change  Outcome Evaluation: Patient presents with deficits in balance, endurance, strength, and transfers. Patient will benefit from skilled PT services to address these mobility deficits and decrease risk of falls.      Anticipated Discharge Disposition (PT): inpatient rehabilitation facility

## 2024-04-16 NOTE — PROCEDURES
Bedside thoracic ultrasound:     Left side: Spleen, left hemidiaphragm, left lower lobe of lung identified.  Tiny pleural effusion identified.  Images saved  Right side: Liver, right hemidiaphragm, right lower lobe of lung identified.  Moderate pleural effusion identified.  Images saved     Site marked for thoracentesis.        CPT 53973 with thoracentesis note    Electronically signed by Severiano Carolina MD, 04/16/24, 3:50 PM EDT.

## 2024-04-16 NOTE — PLAN OF CARE
Goal Outcome Evaluation:         VSS. UOP; lopez inserted today. No c/o pain. Thoracentesis today. Wnd care completed.

## 2024-04-17 LAB
ANION GAP SERPL CALCULATED.3IONS-SCNC: 8.4 MMOL/L (ref 5–15)
BASOPHILS # BLD AUTO: 0.01 10*3/MM3 (ref 0–0.2)
BASOPHILS NFR BLD AUTO: 0.1 % (ref 0–1.5)
BH CV LOWER ARTERIAL LEFT ABI RATIO: 149
BH CV LOWER ARTERIAL LEFT DORSALIS PEDIS SYS MAX: 149
BH CV LOWER ARTERIAL LEFT GREAT TOE SYS MAX: NORMAL
BH CV LOWER ARTERIAL LEFT POST TIBIAL SYS MAX: NORMAL
BH CV LOWER ARTERIAL LEFT TBI RATIO: NORMAL
BH CV LOWER ARTERIAL RIGHT ABI RATIO: NORMAL
BH CV LOWER ARTERIAL RIGHT DORSALIS PEDIS SYS MAX: NORMAL
BH CV LOWER ARTERIAL RIGHT GREAT TOE SYS MAX: 150
BH CV LOWER ARTERIAL RIGHT POST TIBIAL SYS MAX: NORMAL
BH CV LOWER ARTERIAL RIGHT TBI RATIO: 150
BUN SERPL-MCNC: 38 MG/DL (ref 6–20)
BUN/CREAT SERPL: 17.4 (ref 7–25)
CALCIUM SPEC-SCNC: 8.5 MG/DL (ref 8.6–10.5)
CHLORIDE SERPL-SCNC: 90 MMOL/L (ref 98–107)
CO2 SERPL-SCNC: 36.6 MMOL/L (ref 22–29)
CREAT SERPL-MCNC: 2.19 MG/DL (ref 0.76–1.27)
DEPRECATED RDW RBC AUTO: 53.9 FL (ref 37–54)
EGFRCR SERPLBLD CKD-EPI 2021: 34.1 ML/MIN/1.73
EOSINOPHIL # BLD AUTO: 0 10*3/MM3 (ref 0–0.4)
EOSINOPHIL NFR BLD AUTO: 0 % (ref 0.3–6.2)
ERYTHROCYTE [DISTWIDTH] IN BLOOD BY AUTOMATED COUNT: 16 % (ref 12.3–15.4)
GLUCOSE BLDC GLUCOMTR-MCNC: 221 MG/DL (ref 70–99)
GLUCOSE BLDC GLUCOMTR-MCNC: 237 MG/DL (ref 70–99)
GLUCOSE BLDC GLUCOMTR-MCNC: 343 MG/DL (ref 70–99)
GLUCOSE BLDC GLUCOMTR-MCNC: 356 MG/DL (ref 70–99)
GLUCOSE SERPL-MCNC: 154 MG/DL (ref 65–99)
HCT VFR BLD AUTO: 26.6 % (ref 37.5–51)
HGB BLD-MCNC: 7.8 G/DL (ref 13–17.7)
IMM GRANULOCYTES # BLD AUTO: 0.1 10*3/MM3 (ref 0–0.05)
IMM GRANULOCYTES NFR BLD AUTO: 0.8 % (ref 0–0.5)
LYMPHOCYTES # BLD AUTO: 1.31 10*3/MM3 (ref 0.7–3.1)
LYMPHOCYTES NFR BLD AUTO: 10.4 % (ref 19.6–45.3)
MAGNESIUM SERPL-MCNC: 2.3 MG/DL (ref 1.6–2.6)
MCH RBC QN AUTO: 26.8 PG (ref 26.6–33)
MCHC RBC AUTO-ENTMCNC: 29.3 G/DL (ref 31.5–35.7)
MCV RBC AUTO: 91.4 FL (ref 79–97)
MONOCYTES # BLD AUTO: 1.28 10*3/MM3 (ref 0.1–0.9)
MONOCYTES NFR BLD AUTO: 10.1 % (ref 5–12)
NEUTROPHILS NFR BLD AUTO: 78.6 % (ref 42.7–76)
NEUTROPHILS NFR BLD AUTO: 9.92 10*3/MM3 (ref 1.7–7)
NRBC BLD AUTO-RTO: 0.2 /100 WBC (ref 0–0.2)
PHOSPHATE SERPL-MCNC: 2.7 MG/DL (ref 2.5–4.5)
PLATELET # BLD AUTO: 319 10*3/MM3 (ref 140–450)
PMV BLD AUTO: 8.8 FL (ref 6–12)
POTASSIUM SERPL-SCNC: 4.6 MMOL/L (ref 3.5–5.2)
RBC # BLD AUTO: 2.91 10*6/MM3 (ref 4.14–5.8)
SODIUM SERPL-SCNC: 135 MMOL/L (ref 136–145)
UPPER ARTERIAL LEFT ARM BRACHIAL SYS MAX: 147
UPPER ARTERIAL RIGHT ARM BRACHIAL SYS MAX: 152
WBC NRBC COR # BLD AUTO: 12.62 10*3/MM3 (ref 3.4–10.8)

## 2024-04-17 PROCEDURE — 94660 CPAP INITIATION&MGMT: CPT

## 2024-04-17 PROCEDURE — 82948 REAGENT STRIP/BLOOD GLUCOSE: CPT

## 2024-04-17 PROCEDURE — 94799 UNLISTED PULMONARY SVC/PX: CPT

## 2024-04-17 PROCEDURE — 80048 BASIC METABOLIC PNL TOTAL CA: CPT | Performed by: INTERNAL MEDICINE

## 2024-04-17 PROCEDURE — 84100 ASSAY OF PHOSPHORUS: CPT | Performed by: INTERNAL MEDICINE

## 2024-04-17 PROCEDURE — 63710000001 INSULIN DETEMIR PER 5 UNITS: Performed by: INTERNAL MEDICINE

## 2024-04-17 PROCEDURE — 99232 SBSQ HOSP IP/OBS MODERATE 35: CPT | Performed by: INTERNAL MEDICINE

## 2024-04-17 PROCEDURE — 25010000002 METHYLPREDNISOLONE PER 40 MG: Performed by: FAMILY MEDICINE

## 2024-04-17 PROCEDURE — 82948 REAGENT STRIP/BLOOD GLUCOSE: CPT | Performed by: FAMILY MEDICINE

## 2024-04-17 PROCEDURE — 63710000001 INSULIN LISPRO (HUMAN) PER 5 UNITS: Performed by: FAMILY MEDICINE

## 2024-04-17 PROCEDURE — 94761 N-INVAS EAR/PLS OXIMETRY MLT: CPT

## 2024-04-17 PROCEDURE — 99233 SBSQ HOSP IP/OBS HIGH 50: CPT | Performed by: INTERNAL MEDICINE

## 2024-04-17 PROCEDURE — 97110 THERAPEUTIC EXERCISES: CPT

## 2024-04-17 PROCEDURE — 94664 DEMO&/EVAL PT USE INHALER: CPT

## 2024-04-17 PROCEDURE — 85025 COMPLETE CBC W/AUTO DIFF WBC: CPT | Performed by: INTERNAL MEDICINE

## 2024-04-17 PROCEDURE — 25010000002 BUMETANIDE PER 0.5 MG: Performed by: INTERNAL MEDICINE

## 2024-04-17 PROCEDURE — 83735 ASSAY OF MAGNESIUM: CPT | Performed by: INTERNAL MEDICINE

## 2024-04-17 PROCEDURE — 25010000002 CEFEPIME PER 500 MG: Performed by: INTERNAL MEDICINE

## 2024-04-17 RX ORDER — METOLAZONE 5 MG/1
5 TABLET ORAL ONCE
Status: COMPLETED | OUTPATIENT
Start: 2024-04-17 | End: 2024-04-17

## 2024-04-17 RX ADMIN — IPRATROPIUM BROMIDE AND ALBUTEROL SULFATE 3 ML: .5; 3 SOLUTION RESPIRATORY (INHALATION) at 23:55

## 2024-04-17 RX ADMIN — INSULIN DETEMIR 10 UNITS: 100 INJECTION, SOLUTION SUBCUTANEOUS at 20:42

## 2024-04-17 RX ADMIN — DULOXETINE HYDROCHLORIDE 30 MG: 30 CAPSULE, DELAYED RELEASE ORAL at 08:15

## 2024-04-17 RX ADMIN — INSULIN LISPRO 12 UNITS: 100 INJECTION, SOLUTION INTRAVENOUS; SUBCUTANEOUS at 20:42

## 2024-04-17 RX ADMIN — BUMETANIDE 2 MG: 0.25 INJECTION INTRAMUSCULAR; INTRAVENOUS at 15:16

## 2024-04-17 RX ADMIN — CARVEDILOL 25 MG: 25 TABLET, FILM COATED ORAL at 20:42

## 2024-04-17 RX ADMIN — IPRATROPIUM BROMIDE AND ALBUTEROL SULFATE 3 ML: .5; 3 SOLUTION RESPIRATORY (INHALATION) at 00:40

## 2024-04-17 RX ADMIN — DOXYCYCLINE 100 MG: 100 CAPSULE ORAL at 20:42

## 2024-04-17 RX ADMIN — Medication 1 MG: at 08:16

## 2024-04-17 RX ADMIN — CARVEDILOL 25 MG: 25 TABLET, FILM COATED ORAL at 08:16

## 2024-04-17 RX ADMIN — ASPIRIN 81 MG: 81 TABLET, COATED ORAL at 08:16

## 2024-04-17 RX ADMIN — ARFORMOTEROL TARTRATE 15 MCG: 15 SOLUTION RESPIRATORY (INHALATION) at 09:57

## 2024-04-17 RX ADMIN — GUAIFENESIN 1200 MG: 600 TABLET ORAL at 20:42

## 2024-04-17 RX ADMIN — INSULIN LISPRO 10 UNITS: 100 INJECTION, SOLUTION INTRAVENOUS; SUBCUTANEOUS at 17:19

## 2024-04-17 RX ADMIN — Medication 400 MG: at 08:16

## 2024-04-17 RX ADMIN — NIFEDIPINE 30 MG: 30 TABLET, FILM COATED, EXTENDED RELEASE ORAL at 08:16

## 2024-04-17 RX ADMIN — TRAZODONE HYDROCHLORIDE 50 MG: 50 TABLET ORAL at 20:42

## 2024-04-17 RX ADMIN — LEVETIRACETAM 500 MG: 500 TABLET, FILM COATED ORAL at 08:16

## 2024-04-17 RX ADMIN — METOLAZONE 5 MG: 5 TABLET ORAL at 12:18

## 2024-04-17 RX ADMIN — DOXYCYCLINE 100 MG: 100 CAPSULE ORAL at 08:16

## 2024-04-17 RX ADMIN — INSULIN DETEMIR 10 UNITS: 100 INJECTION, SOLUTION SUBCUTANEOUS at 08:15

## 2024-04-17 RX ADMIN — IPRATROPIUM BROMIDE AND ALBUTEROL SULFATE 3 ML: .5; 3 SOLUTION RESPIRATORY (INHALATION) at 09:57

## 2024-04-17 RX ADMIN — IPRATROPIUM BROMIDE AND ALBUTEROL SULFATE 3 ML: .5; 3 SOLUTION RESPIRATORY (INHALATION) at 18:43

## 2024-04-17 RX ADMIN — ATORVASTATIN CALCIUM 20 MG: 20 TABLET, FILM COATED ORAL at 20:42

## 2024-04-17 RX ADMIN — FAMOTIDINE 40 MG: 20 TABLET ORAL at 08:15

## 2024-04-17 RX ADMIN — INSULIN LISPRO 5 UNITS: 100 INJECTION, SOLUTION INTRAVENOUS; SUBCUTANEOUS at 08:14

## 2024-04-17 RX ADMIN — INSULIN LISPRO 5 UNITS: 100 INJECTION, SOLUTION INTRAVENOUS; SUBCUTANEOUS at 12:43

## 2024-04-17 RX ADMIN — BUMETANIDE 2 MG: 0.25 INJECTION INTRAMUSCULAR; INTRAVENOUS at 08:15

## 2024-04-17 RX ADMIN — FINASTERIDE 5 MG: 5 TABLET, FILM COATED ORAL at 08:16

## 2024-04-17 RX ADMIN — BUMETANIDE 2 MG: 0.25 INJECTION INTRAMUSCULAR; INTRAVENOUS at 20:42

## 2024-04-17 RX ADMIN — ARFORMOTEROL TARTRATE 15 MCG: 15 SOLUTION RESPIRATORY (INHALATION) at 18:43

## 2024-04-17 RX ADMIN — APIXABAN 5 MG: 5 TABLET, FILM COATED ORAL at 15:16

## 2024-04-17 RX ADMIN — IPRATROPIUM BROMIDE AND ALBUTEROL SULFATE 3 ML: .5; 3 SOLUTION RESPIRATORY (INHALATION) at 13:34

## 2024-04-17 RX ADMIN — LEVETIRACETAM 500 MG: 500 TABLET, FILM COATED ORAL at 20:42

## 2024-04-17 RX ADMIN — BUDESONIDE 0.5 MG: 0.5 INHALANT RESPIRATORY (INHALATION) at 18:43

## 2024-04-17 RX ADMIN — FERROUS SULFATE TAB 325 MG (65 MG ELEMENTAL FE) 325 MG: 325 (65 FE) TAB at 12:18

## 2024-04-17 RX ADMIN — CEFEPIME 2000 MG: 2 INJECTION, POWDER, FOR SOLUTION INTRAVENOUS at 08:15

## 2024-04-17 RX ADMIN — CEFEPIME 2000 MG: 2 INJECTION, POWDER, FOR SOLUTION INTRAVENOUS at 00:12

## 2024-04-17 RX ADMIN — EMPAGLIFLOZIN 10 MG: 10 TABLET, FILM COATED ORAL at 08:16

## 2024-04-17 RX ADMIN — BUDESONIDE 0.5 MG: 0.5 INHALANT RESPIRATORY (INHALATION) at 09:57

## 2024-04-17 RX ADMIN — GUAIFENESIN 1200 MG: 600 TABLET ORAL at 08:16

## 2024-04-17 RX ADMIN — METHYLPREDNISOLONE SODIUM SUCCINATE 40 MG: 40 INJECTION INTRAMUSCULAR; INTRAVENOUS at 08:15

## 2024-04-17 RX ADMIN — Medication 10 ML: at 08:17

## 2024-04-17 RX ADMIN — Medication 10 ML: at 20:43

## 2024-04-17 RX ADMIN — MONTELUKAST 10 MG: 10 TABLET, FILM COATED ORAL at 20:42

## 2024-04-17 RX ADMIN — CEFEPIME 2000 MG: 2 INJECTION, POWDER, FOR SOLUTION INTRAVENOUS at 20:43

## 2024-04-17 RX ADMIN — APIXABAN 5 MG: 5 TABLET, FILM COATED ORAL at 02:30

## 2024-04-17 NOTE — PLAN OF CARE
Goal Outcome Evaluation:                 VSS, A&O x4, tolerating diet and medication. Wound care completed, lopez cath in place, draining clear yellow urine

## 2024-04-17 NOTE — CONSULTS
Nutrition Services    Patient Name: Preston Wallis  YOB: 1965  MRN: [id number]  Admission date: 4/14/2024      CLINICAL NUTRITION ASSESSMENT      Reason for Assessment   Pressure injury     H&P:  Past Medical History:   Diagnosis Date    Age-related cognitive decline     Allergic contact dermatitis     Allergies     Anemia     Bronchiectasis with acute lower respiratory infection     Charcot foot due to diabetes mellitus 9/10/2013    Chronic diastolic (congestive) heart failure     Chronic kidney disease     Chronic respiratory failure with hypoxia     Closed supracondylar fracture of femur 1/12/2022    COPD (chronic obstructive pulmonary disease)     Deep vein thrombosis (DVT) of lower extremity associated with air travel 1/13/2023    Dependence on supplemental oxygen     Eczema     Erectile dysfunction     due to organic reasons    Essential (primary) hypertension     Fracture     closed fracture of other tarsal and metatarsal bones    Fracture of proximal humerus 1/13/2023    GERD without esophagitis     High risk medication use     Hypercholesteremia     Hypomagnesemia     Infected stasis ulcer of left lower extremity 1/13/2023    Insomnia     Low back pain     Major depressive disorder     Morbid (severe) obesity due to excess calories     MRSA pneumonia     Muscle weakness     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 without evidence of necrosis     Obstructive sleep apnea (adult) (pediatric)     Other forms of dyspnea     Other long term (current) drug therapy     Other specified noninfective gastroenteritis and colitis     Other spondylosis, lumbar region     Pain in both knees     Paroxysmal atrial fibrillation     Peripheral neuropathy     attributed to type 2 diabetes    Pneumonia, unspecified organism     Polyneuropathy     Rash and other nonspecific skin eruption     Syncope and collapse     Tachycardia     Tinnitus 1/13/2023    Type 1 diabetes  "mellitus with diabetic chronic kidney disease     Type 2 diabetes mellitus     Unspecified fall, initial encounter     Urinary retention         Current Problems:   Active Hospital Problems    Diagnosis     **Acute on chronic respiratory failure with hypercapnia     Chronic respiratory failure with hypoxia and hypercapnia     Wheezing     Ulcer of left foot, limited to breakdown of skin     Type 2 DM with CKD stage 3 and hypertension     Stage 3b chronic kidney disease     Tobacco abuse, in remission     Paroxysmal atrial fibrillation     Obstructive sleep apnea     COPD exacerbation     Seizures         Nutrition/Diet History         Narrative   Visit with pt was limited as he needed nursing assistance, discussed with RN. Pt has been eating well (% of meals). Per wound care pt has unstageable pressure injuries. Will order Rosalino BID and monitor acceptance/need for adjustments. NFPE: no signs of fat or muscle wasting. Per chart review, pt has lost 38# over 10 months, although I'm unsure if this was intentional or not. BM 4/16, pt has stool softeners ordered PRN.      Anthropometrics        Current Height, Weight Height: 175.3 cm (69\")  Weight: 115 kg (252 lb 13.9 oz)   Current BMI Body mass index is 37.34 kg/m².   BMI Classification Obese Class II   % %    Adjusted Body Weight (ABW)    Weight Hx  Wt Readings from Last 30 Encounters:   04/17/24 0616 115 kg (252 lb 13.9 oz)   04/16/24 0519 124 kg (272 lb 11.3 oz)   04/15/24 0617 124 kg (272 lb 14.9 oz)   04/15/24 0057 124 kg (273 lb 9.5 oz)   04/15/24 0050 124 kg (273 lb 9.5 oz)   04/14/24 1908 127 kg (279 lb 1.6 oz)   04/04/24 1042 122 kg (270 lb)   02/16/24 1458 126 kg (276 lb 10.8 oz)   01/30/24 1032 128 kg (282 lb)   01/11/24 1556 123 kg (272 lb)   12/15/23 0330 127 kg (280 lb)   12/14/23 1400 126 kg (277 lb)   12/06/23 1904 126 kg (277 lb 12.5 oz)   11/20/23 0953 126 kg (278 lb)   11/17/23 1019 126 kg (278 lb)   11/06/23 1119 126 kg (278 lb) "   10/11/23 1020 126 kg (278 lb)   08/22/23 0917 126 kg (278 lb)   08/21/23 1017 129 kg (285 lb)   07/24/23 0849 129 kg (285 lb)   07/19/23 1346 129 kg (285 lb)   06/29/23 1009 132 kg (290 lb)   03/23/23 1508 132 kg (290 lb)   02/21/23 1050 127 kg (280 lb)   01/13/23 1316 127 kg (280 lb)   11/03/22 1211 123 kg (271 lb)   10/19/22 1253 122 kg (270 lb)   09/01/22 1118 123 kg (271 lb)   08/25/22 1055 123 kg (271 lb)   08/16/22 1104 123 kg (272 lb)   07/13/22 1330 123 kg (272 lb)   07/06/22 1100 123 kg (272 lb)   06/30/22 1320 123 kg (272 lb)   06/15/22 1012 123 kg (272 lb)   05/10/22 0842 123 kg (272 lb)   04/05/22 2151 123 kg (270 lb 8.1 oz)   04/05/22 1501 125 kg (276 lb 3.8 oz)          Wt Change Observation 13% loss over 10 months      Estimated/Assessed Needs  Estimated Needs based on: Ideal Body Weight       Energy Requirements 30 kcal/kg    EST Needs (kcal/day) 2130 kcal/day        Protein Requirements 1.2 g/kg    EST Daily Needs (g/day) 85 g/day        Fluid Requirements 1 ml/kcal    Estimated Needs (mL/day) 2130 ml/day      Labs/Medications         Pertinent Labs Reviewed.   Results from last 7 days   Lab Units 04/17/24  0359 04/16/24  0353 04/15/24  0306 04/14/24  1923   SODIUM mmol/L 135* 137 136 138   POTASSIUM mmol/L 4.6 4.8 4.4 4.7   CHLORIDE mmol/L 90* 94* 92* 93*   CO2 mmol/L 36.6* 36.3* 36.7* 38.4*   BUN mg/dL 38* 29* 23* 23*   CREATININE mg/dL 2.19* 1.72* 1.90* 1.92*   CALCIUM mg/dL 8.5* 8.2* 8.5* 8.6   BILIRUBIN mg/dL  --   --   --  0.2   ALK PHOS U/L  --   --   --  193*   ALT (SGPT) U/L  --   --   --  17   AST (SGOT) U/L  --   --   --  20   GLUCOSE mg/dL 154* 224* 158* 234*     Results from last 7 days   Lab Units 04/17/24  0359 04/16/24  0353 04/14/24  1923 04/13/24  0022   MAGNESIUM mg/dL 2.3 2.4  --  2.5*   PHOSPHORUS mg/dL 2.7 4.0   < >  --    HEMOGLOBIN g/dL 7.8* 7.9*   < >  --    HEMATOCRIT % 26.6* 26.5*   < >  --     < > = values in this interval not displayed.     COVID19   Date Value Ref  Range Status   04/15/2024 Not Detected Not Detected - Ref. Range Final     Lab Results   Component Value Date    HGBA1C 8.20 (H) 12/13/2023         Pertinent Medications Reviewed.     Malnutrition Severity Assessment              Nutrition Diagnosis         Nutrition Dx Problem 1 Increased nutrient needs related to  pressure injuries  as evidenced by  increased calorie and protein needs      Nutrition Intervention           Current Nutrition Orders & Evaluation of Intake       Current PO Diet Diet: Cardiac, Fluid Restriction (240 mL/tray); Low Sodium (2g); 1500 mL/day; Fluid Consistency: Thin (IDDSI 0)   Supplement No active supplement orders           Nutrition Intervention/Prescription        Rosalino BID (Each supplement contains 90 kcal, 2.5g protein)-monitor acceptance/need for adjustments         Medical Nutrition Therapy/Nutrition Education          Learner     Readiness Patient  Acceptance     Method     Response Explanation  Verbalizes understanding     Monitor/Evaluation        Monitor PO intake, Supplement intake, Skin status     Nutrition Discharge Plan         Recommend to continue oral nutrition supplements on discharge.      Electronically signed by:  Corinna Lopez RD  04/17/24 13:43 EDT

## 2024-04-17 NOTE — PLAN OF CARE
Goal Outcome Evaluation:      Patient is currently on 3 l/m nasal cannula and tolerating well. He has his home unit at the bedside but said that last night it stopped working correctly so we have a hospital Bipap at bedside that he wore last night and tolerated well.

## 2024-04-17 NOTE — PROGRESS NOTES
[facility]   Hospitalist Progress Note    Date of admission: 4/14/2024  Patient Name: Preston Wallis  1965  Date: 4/17/2024      Subjective     Chief Complaint   Patient presents with   • Shortness of Breath       Interval Followup:   Patient states that he is feeling much better today  Home NIPPV was reevaluated notes that it is actually working discussed that he likely was having worsening symptoms from CHF and effusion are contributing to initial concerns with NIPPV.  His home adherence was very poor when outpatient data reviewed pulled by RT .  No overt fevers.  Patient straight caths at home several times a day is interested/willing to use indwelling catheter for the next day or 2 until diuresis is slowing      Objective     Vitals:   Temp:  [97.7 °F (36.5 °C)-99.1 °F (37.3 °C)] 97.7 °F (36.5 °C)  Heart Rate:  [72-82] 76  Resp:  [16-18] 16  BP: (132-149)/(51-58) 149/53  Flow (L/min):  [3] 3    Physical Exam  General: alert no distress, resting at the bedside   Lungs: Clear, no wheezing   CV: RRR lower extremity pitting edema starting to decrease  Obese abdomen nontender  ANO x 3 much more appropriate overall  Lower extremity diabetic foot ulcer dressed    Result Review:  Vital signs, labs and recent relevant imaging reviewed.      •  acetaminophen  •  albuterol  •  aluminum-magnesium hydroxide-simethicone  •  benzonatate  •  senna-docusate sodium **AND** polyethylene glycol **AND** bisacodyl **AND** bisacodyl  •  busPIRone  •  dextrose  •  dextrose  •  Diclofenac Sodium  •  glucagon (human recombinant)  •  guaiFENesin-dextromethorphan  •  hydrOXYzine  •  Lidocaine  •  melatonin  •  nicotine  •  ondansetron  •  ondansetron  •  Pharmacy to Dose Cefepime  •  prochlorperazine  •  sodium chloride  •  sodium chloride  •  sodium chloride  •  sodium chloride    apixaban, 5 mg, Oral, Q12H  arformoterol, 15 mcg, Nebulization, BID - RT  aspirin, 81 mg, Oral, Daily  atorvastatin, 20 mg, Oral,  Nightly  budesonide, 0.5 mg, Nebulization, BID - RT  bumetanide, 2 mg, Intravenous, TID  carvedilol, 25 mg, Oral, Q12H  cefepime, 2,000 mg, Intravenous, Q12H  doxycycline, 100 mg, Oral, Q12H  DULoxetine, 30 mg, Oral, Daily  empagliflozin, 10 mg, Oral, Daily  famotidine, 40 mg, Oral, QAM  ferrous sulfate, 325 mg, Oral, Daily With Lunch  finasteride, 5 mg, Oral, Daily  folic acid, 1 mg, Oral, Daily  guaiFENesin, 1,200 mg, Oral, Q12H  insulin detemir, 10 Units, Subcutaneous, BID  insulin lispro, 3-14 Units, Subcutaneous, 4x Daily AC & at Bedtime  ipratropium-albuterol, 3 mL, Nebulization, Q6H - RT  levETIRAcetam, 500 mg, Oral, BID  magnesium oxide, 400 mg, Oral, Daily  methylPREDNISolone sodium succinate, 40 mg, Intravenous, Daily  montelukast, 10 mg, Oral, Nightly  NIFEdipine XL, 30 mg, Oral, QAM  sodium chloride, 10 mL, Intravenous, Q12H  traZODone, 50 mg, Oral, Nightly        XR Chest 1 View    Result Date: 4/16/2024  Impression:  1. No pneumothorax status post thoracentesis 2. Stable multifocal airspace disease   Electronically Signed By-Derrick Cortez On:4/16/2024 3:30 PM      CT Chest Without Contrast Diagnostic    Result Date: 4/15/2024  1.  Moderate bilateral pleural effusions. Bibasilar opacity right greater than left. Pneumonia most likely. 2.  Noncalcified nodule in the lingula measuring 9 mm. Scattered areas of groundglass with mild septal thickening likely due to superimposed pulmonary edema. 3.  Severe varicose and cystic bronchiectasis. 4.  New opacity in the lateral right upper lobe atelectasis versus pneumonia. 5.  A follow-up CT chest in 3 months time is recommended to evaluate for the resolution of the noncalcified nodules and parenchymal opacities.  Electronically Signed By-Francine Garner MD On:4/15/2024 5:19 PM      XR Chest 1 View    Result Date: 4/14/2024  Chronic changes in both lungs are similar to prior. Increased interstitial opacities bilaterally may reflect superimposed pulmonary edema. A  small amount of right pleural fluid may also be present.  Electronically Signed By-Dr. Edison Guthrie MD On:4/14/2024 7:37 PM      XR Chest 1 View    Result Date: 4/13/2024  There has been no significant interval change.     CBC          4/15/2024    03:06 4/16/2024    03:53 4/17/2024    03:59   CBC   WBC 8.00  6.52  12.62    RBC 2.75  2.88  2.91    Hemoglobin 7.5  7.9  7.8    Hematocrit 25.7  26.5  26.6    MCV 93.5  92.0  91.4    MCH 27.3  27.4  26.8    MCHC 29.2  29.8  29.3    RDW 15.9  15.8  16.0    Platelets 327  334  319      CMP          4/15/2024    03:06 4/16/2024    03:53 4/17/2024    03:59   CMP   Glucose 158  224  154    BUN 23  29  38    Creatinine 1.90  1.72  2.19    EGFR 40.4  45.5  34.1    Sodium 136  137  135    Potassium 4.4  4.8  4.6    Chloride 92  94  90    Calcium 8.5  8.2  8.5    BUN/Creatinine Ratio 12.1  16.9  17.4    Anion Gap 7.3  6.7  8.4        Assessment / Plan     Summary: 58-year-old male with severe COPD FEV1 26%, paroxysmal A-fib on Eliquis, diastolic CHF, MILADY with poor adherence to nippv, who presented with acutely worsening shortness of breath over the past week.  Pulmonology consulted for assistance, underwent thoracentesis 4/16 800 cc on the right    Assessment/Plan (clinically significant if listed here)  AHHRF requiring bipap continuous  Severe COPD with acute exacerbation, 2 L baseline  Diastolic CHF with exacerbation  Bilateral pleural effusions right greater than left; s/p right thora on 4/16 of 800cc's  Suspected CAP from unspecified bacterial organism  MILADY/OHS noncompliant to home nippv   Paroxysmal A-fib on Eliquis  History of DVT  CKD 3/4 baseline creatinine near 2  Neurogenic bladder with intermittent straight catheterizations outpatient  Hypertension  Seizure disorder on Keppra  Depression/mood disorder  Morbid obesity BMI 40  IDDM2 with neuropathy  Left foot diabetic ulcer, debrided 3/2024, MRSA infection at that time  Chronic anemia, appears secondary to chronic  disease baseline hemoglobin near 7-8  History of cognitive decline  History of pneumonia secondary to Pseudomonas in 2020 and MRSA in 2021  Recent COVID-19 pneumonia in March    PLAN   CT chest with bilateral pleural effusions, findings suggestive of pneumonia, cystic bronchiectasis, outpatient CT chest in approximately 3 months recommended to monitor  Continue high-dose IV diuresis monitor I's and O's renal function Daily weights, Jardiance, patient on GDMT as able  Bnp wnl but falsely low/not accurate in setting of patient's morbid obesity   2/2024 echo - 61%, g1dd, no sig valvular abnormalities. Will defer repeat echo  Troponin trend flat/down, low than historic trend, suspect nstemi 2, no acute st elevation on EKG.  Continue Coreg, aspirin, statin, Eliquis for paroxysmal A-fib, nifedipine  brov/pulm, cont scheduled nebs, steroids, BPH  Bipap for notable hypercapnia, use with naps/prn, home unit reassessed and functioning properly  Recent hospitalization with Pseudomonas infection as well as more remote history, has recent hospital stay also was treated for MRSA diabetic foot infection  Continue IV cefepime for now, sputum with prelim moderate gram-negative bacilli, may warrant tobramycin and more extended antibiotic treatment course depending  Follow-up final cultures and continue to monitor closely  Continue doxycycline course for COPD and may have added benefit additional infectious coverage/diabetic foot does not appear grossly infected at this time however continue local wound care.  Additional KEYLA studies pending as per podiatry/initial case discussed appreciate assistance  Levemir, ssi, jardiance, monitor glucose   Elevated in part from steroids.  Increase/start on a.m. dose of Levemir titrate further based on steroids/trend  Gi ppx / cont pt's famotidine  Tsat 17, ferritin 117, appears AOCD; may benefit from epo.  hb 7.5 which is patients baseline for past several months, suspect AOCD, will also continue  pts home po iron  Recent hospital d/c from [facility] discharge had some bleeding from his foot did require 1 unit PRBCs there, no overt bleeding noted here  Cont finasteride for now straight cath as outpatient, could likely discontinue will defer to urology  Podiatry evaluated for Patient's left heel wound and left lateral plantar wound has notable eschar with surrounding skin irritation no acute drainage  Offload while in bed, Betadine to wounds, follow-up outpatient with podiatry no acute surgical intervention required does not suspect acutely infected foot wound  Continue Karely  PT/OT  Check a.m. CBC, BMP, magnesium, phosphorus, monitor for toxicities while on cefepime  Continue hospitalization at current level of care        DVT prophylaxis:  Medical DVT prophylaxis orders are present.      Code Status (Patient has no pulse and is not breathing): CPR (Attempt to Resuscitate)  Medical Interventions (Patient has pulse or is breathing): Full Support

## 2024-04-17 NOTE — PROGRESS NOTES
Pulmonary / Critical Care Progress Note      Patient Name: Preston Wallis  : 1965  MRN: [id number]  Primary Care Physician:  Kimmy Riley MD  Date of admission: 2024    Subjective   Subjective   Follow-up for Acute on chronic hypercapnic respiratory failure requiring NIPPV, CHF exacerbation    Pleural fluid transudate consistent with volume overload  Wore NIPPV overnight  Down to 3 L of oxygen  Volume status improved however creatinine uptrending  Sodium low  Dyspnea and cough better  Still with some of her orthopnea  No nausea, fevers or chills    Objective   Objective     Vitals:   Temp:  [97.9 °F (36.6 °C)-99.1 °F (37.3 °C)] 99.1 °F (37.3 °C)  Heart Rate:  [68-82] 72  Resp:  [16-18] 16  BP: (130-149)/(51-58) 141/58  Flow (L/min):  [3] 3      Physical Exam   Vital Signs Reviewed   General: WDWN, Alert, NAD.  Chronically ill-appearing male, lying in bed  HEENT:  PERRL, EOMI.  OP, nares clear, no sinus tenderness  Neck:  Supple, no JVD, no thyromegaly  Chest: Improved aeration with bibasilar crackles, tympanic to percussion bilaterally, no work of breathing noted  CV: RRR, no MGR, pulses 2+, equal.  Abd:  Soft, NT, ND, + BS, no HSM, obese  EXT:  no clubbing, no cyanosis, anasarca, BLE edema  Neuro:  A&Ox3, CN grossly intact, no focal deficits.  Skin: No rashes or lesions noted      Result Review    Result Review:  I have personally reviewed the results from the time of this admission to 2024 09:12 EDT and agree with these findings:  [x]  Laboratory  [x]  Microbiology  [x]  Radiology  []  EKG/Telemetry   []  Cardiology/Vascular   []  Pathology  []  Old records  []  Other:  Most notable findings include:         Lab 24  0359 24  0353 04/15/24  0306 24  1923   WBC 12.62* 6.52 8.00 7.89   HEMOGLOBIN 7.8* 7.9* 7.5* 7.4*   HEMATOCRIT 26.6* 26.5* 25.7* 26.0*   PLATELETS 319 334 327 312   SODIUM 135* 137 136 138   POTASSIUM 4.6 4.8 4.4 4.7   CHLORIDE 90* 94* 92* 93*   CO2 36.6*  36.3* 36.7* 38.4*   BUN 38* 29* 23* 23*   CREATININE 2.19* 1.72* 1.90* 1.92*   GLUCOSE 154* 224* 158* 234*   CALCIUM 8.5* 8.2* 8.5* 8.6   PHOSPHORUS 2.7 4.0  --   --    TOTAL PROTEIN  --   --   --  7.2   ALBUMIN  --   --   --  3.4*   GLOBULIN  --   --   --  3.8     CT Chest Without Contrast Diagnostic    Result Date: 4/15/2024  CT CHEST WO CONTRAST DIAGNOSTIC-  Date of Exam: 4/15/2024 4:57 PM  Indication: soa, possible pna, plueral effusion eval,  pmhx copd/chf/morb obesity/laura; J96.02-Acute respiratory failure with hypercapnia.  Comparison: Chest x-ray 4/14/2024, CT chest 12/9/2023  Technique: Axial CT images were obtained of the chest without contrast administration.  Reconstructed coronal and sagittal images were also obtained. Automated exposure control and iterative construction methods were used.   Findings:  Hilum and Mediastinum: Moderate coronary artery atherosclerotic disease.  No pericardial effusion.  Unremarkable thoracic aorta and pulmonary arteries. There is a right-sided port. The tip terminates at the superior vena cava. There is a residual port in the left soft tissues and internal jugular terminating at the cavoatrial junction. Cardiomegaly. Precarinal lymph node is enlarged. On image 121 it measures 1.3 cm in short axis. This is similar to previous exam.  Lung Parenchyma and Pleura: There are moderate bilateral pleural effusions. These are new. There is bronchiectasis which is varicose. There is bibasilar airspace opacity right greater than left pneumonia favored over atelectasis. There is scattered geographic groundglass opacity. There is new consolidation in the lateral right upper lobe. In the lingula there is a noncalcified nodule on image #217. It measures 9 mm. There is surrounding groundglass opacity. This is new.  Upper Abdomen: Unremarkable.  Soft tissues: Unremarkable.  Osseous structures: No aggressive focal lytic or sclerotic osseous lesions. Osteopenia.      Impression: 1.  Moderate  bilateral pleural effusions. Bibasilar opacity right greater than left. Pneumonia most likely. 2.  Noncalcified nodule in the lingula measuring 9 mm. Scattered areas of groundglass with mild septal thickening likely due to superimposed pulmonary edema. 3.  Severe varicose and cystic bronchiectasis. 4.  New opacity in the lateral right upper lobe atelectasis versus pneumonia. 5.  A follow-up CT chest in 3 months time is recommended to evaluate for the resolution of the noncalcified nodules and parenchymal opacities.  Electronically Signed By-Francine Garner MD On:4/15/2024 5:19 PM       Pleural fluid transudate and consistent with volume overload      Assessment & Plan   Assessment / Plan     Active Hospital Problems:  Active Hospital Problems    Diagnosis     **Acute on chronic respiratory failure with hypercapnia     Chronic respiratory failure with hypoxia and hypercapnia     Wheezing     Ulcer of left foot, limited to breakdown of skin     Type 2 DM with CKD stage 3 and hypertension     Stage 3b chronic kidney disease     Tobacco abuse, in remission     Paroxysmal atrial fibrillation     Obstructive sleep apnea     COPD exacerbation     Seizures      Impression:  Acute on chronic hypercapnic respiratory failure requiring NIPPV  Severe COPD with exacerbation, FEV1 26%  Obesity hypoventilation syndrome  NSTEMI type II from demand ischemia  Acute on chronic decompensated congestive diastolic heart failure  Acute cardiogenic pulmonary edema  Bilateral pleural effusions  9 mm lingular lung nodule  Anasarca  Anemia  CKD  Hypocalcemia  Hyponatremia, clinically insignificant  Medical noncompliance of NIV  History of MILADY  Tobacco abuse in remission     Plan:  Wean O2 to keep SPO2 greater than 90%  Pleural fluid transudate and consistent with volume overload  -Continue Brovana, Pulmicort, Eloisa  -Continue Solu-Medrol 40 mg IV daily.  Change over to prednisone 40 mg daily tomorrow to complete 7 days  -Complete 7 days of  doxycycline 100 mg twice daily  -Continue bronchopulmonary bronchodilator protocols  -Continue Jardiance  -Continue Bumex 2 mg IV 3 times daily.  Redose metolazone 5 mg x 1  -Continue to monitor renal panel and electrolytes.  Replace electrolytes as necessary  -Continue Eliquis for A-fib  -PT/OT on board.  Appreciate assistance  -RT  on board.  Appreciate assistance  -Follow-up with us as an outpatient 1 to 2 weeks after discharge    DVT prophylaxis:  Medical DVT prophylaxis orders are present.    CODE STATUS:   Code Status (Patient has no pulse and is not breathing): CPR (Attempt to Resuscitate)  Medical Interventions (Patient has pulse or is breathing): Full Support    Labs, managing, and medications personally reviewed  Discussed with primary and patient    Electronically signed by Severiano Carolina MD, 04/17/24, 4:23 PM EDT.    Addendum:    Patient has a daily productive cough lasting greater than 6 months.  Aerobika has been tried, but not effectively mobilizing secretions.  CT scan performed on 4/15/24 confirms bronchiectasis.  Ordering chest vest therapy for patient.    Electronically signed by CON Camilo, 04/18/24, 8:58 AM EDT.

## 2024-04-17 NOTE — PLAN OF CARE
Goal Outcome Evaluation:  Plan of Care Reviewed With: patient        Progress: no change  Outcome Evaluation: VSS. On 3L NC. Wore bipap when asleep. Doyle draining yellow urine. Had x1 bowel movement. BGL monitored. Insulin given per sliding scale. Had doppler scan tonight of leg.

## 2024-04-17 NOTE — PLAN OF CARE
Goal Outcome Evaluation:   Patient wore home Bipap unit tonight at bedtime. Our unit is still in room on standby, due to the patient not knowing if his machine was going to work appropriately. When not on the Bipap, the patient is on his home O2 at 3L.

## 2024-04-17 NOTE — PROGRESS NOTES
[facility]   Hospitalist Progress Note    Date of admission: 4/14/2024  Patient Name: Preston Wallis  1965  Date: 4/16/2024      Subjective     Chief Complaint   Patient presents with    Shortness of Breath       Interval Followup: Much less short of air today.  Home NIPPV was reevaluated notes that it is actually working discussed that he likely was having worsening symptoms from CHF and effusion are contributing to initial concerns with NIPPV.  His home adherence was very poor when outpatient data reviewed pulled by RT .  No overt fevers.  Patient straight caths at home several times a day is interested/willing to use indwelling catheter for the next day or 2 until diuresis is slowing      Objective     Vitals:   Temp:  [97.9 °F (36.6 °C)-99 °F (37.2 °C)] 99 °F (37.2 °C)  Heart Rate:  [60-78] 72  Resp:  [18-20] 18  BP: (124-149)/(46-58) 149/58  Flow (L/min):  [3-4] 3    Physical Exam  Much more awake and conversant today on nasal cannula wife at bedside  Crackles in bases and mild rhonchi starting to improve with conversational dyspnea  RRR lower extremity pitting edema starting to decrease  Obese abdomen nontender  ANO x 3 much more appropriate overall  Lower extremity diabetic foot ulcer dressed    Result Review:  Vital signs, labs and recent relevant imaging reviewed.        acetaminophen    albuterol    aluminum-magnesium hydroxide-simethicone    benzonatate    senna-docusate sodium **AND** polyethylene glycol **AND** bisacodyl **AND** bisacodyl    busPIRone    dextrose    dextrose    Diclofenac Sodium    glucagon (human recombinant)    guaiFENesin-dextromethorphan    hydrOXYzine    Lidocaine    melatonin    nicotine    ondansetron    ondansetron    Pharmacy to Dose Cefepime    prochlorperazine    sodium chloride    sodium chloride    sodium chloride    sodium chloride    apixaban, 5 mg, Oral, Q12H  arformoterol, 15 mcg, Nebulization, BID - RT  aspirin, 81 mg, Oral, Daily  atorvastatin, 20  mg, Oral, Nightly  budesonide, 0.5 mg, Nebulization, BID - RT  bumetanide, 2 mg, Intravenous, TID  carvedilol, 25 mg, Oral, Q12H  cefepime, 2,000 mg, Intravenous, Q8H  doxycycline, 100 mg, Oral, Q12H  DULoxetine, 30 mg, Oral, Daily  empagliflozin, 10 mg, Oral, Daily  famotidine, 40 mg, Oral, QAM  ferrous sulfate, 325 mg, Oral, Daily With Lunch  finasteride, 5 mg, Oral, Daily  folic acid, 1 mg, Oral, Daily  guaiFENesin, 1,200 mg, Oral, Q12H  insulin detemir, 15 Units, Subcutaneous, Nightly  insulin lispro, 3-14 Units, Subcutaneous, 4x Daily AC & at Bedtime  ipratropium-albuterol, 3 mL, Nebulization, Q6H - RT  levETIRAcetam, 500 mg, Oral, BID  magnesium oxide, 400 mg, Oral, Daily  methylPREDNISolone sodium succinate, 40 mg, Intravenous, Daily  montelukast, 10 mg, Oral, Nightly  NIFEdipine XL, 30 mg, Oral, QAM  sodium chloride, 10 mL, Intravenous, Q12H  traZODone, 50 mg, Oral, Nightly        XR Chest 1 View    Result Date: 4/16/2024  Impression:  1. No pneumothorax status post thoracentesis 2. Stable multifocal airspace disease   Electronically Signed By-Derrick Cortez On:4/16/2024 3:30 PM      CT Chest Without Contrast Diagnostic    Result Date: 4/15/2024  1.  Moderate bilateral pleural effusions. Bibasilar opacity right greater than left. Pneumonia most likely. 2.  Noncalcified nodule in the lingula measuring 9 mm. Scattered areas of groundglass with mild septal thickening likely due to superimposed pulmonary edema. 3.  Severe varicose and cystic bronchiectasis. 4.  New opacity in the lateral right upper lobe atelectasis versus pneumonia. 5.  A follow-up CT chest in 3 months time is recommended to evaluate for the resolution of the noncalcified nodules and parenchymal opacities.  Electronically Signed By-Francine Garner MD On:4/15/2024 5:19 PM      XR Chest 1 View    Result Date: 4/14/2024  Chronic changes in both lungs are similar to prior. Increased interstitial opacities bilaterally may reflect superimposed pulmonary  edema. A small amount of right pleural fluid may also be present.  Electronically Signed By-Dr. Edison Guthrie MD On:4/14/2024 7:37 PM      XR Chest 1 View    Result Date: 4/13/2024  There has been no significant interval change.     CBC          4/14/2024    19:23 4/15/2024    03:06 4/16/2024    03:53   CBC   WBC 7.89  8.00  6.52    RBC 2.74  2.75  2.88    Hemoglobin 7.4  7.5  7.9    Hematocrit 26.0  25.7  26.5    MCV 94.9  93.5  92.0    MCH 27.0  27.3  27.4    MCHC 28.5  29.2  29.8    RDW 15.7  15.9  15.8    Platelets 312  327  334      CMP          4/14/2024    19:23 4/15/2024    03:06 4/16/2024    03:53   CMP   Glucose 234  158  224    BUN 23  23  29    Creatinine 1.92  1.90  1.72    EGFR 39.9  40.4  45.5    Sodium 138  136  137    Potassium 4.7  4.4  4.8    Chloride 93  92  94    Calcium 8.6  8.5  8.2    Total Protein 7.2      Albumin 3.4      Globulin 3.8      Total Bilirubin 0.2      Alkaline Phosphatase 193      AST (SGOT) 20      ALT (SGPT) 17      Albumin/Globulin Ratio 0.9      BUN/Creatinine Ratio 12.0  12.1  16.9    Anion Gap 6.6  7.3  6.7        Assessment / Plan     Summary: 58-year-old male with severe COPD FEV1 26%, paroxysmal A-fib on Eliquis, diastolic CHF, MILADY with poor adherence to nippv, who presented with acutely worsening shortness of breath over the past week.  Pulmonology consulted for assistance, underwent thoracentesis 4/16 800 cc on the right    Assessment/Plan (clinically significant if listed here)  AHHRF requiring bipap continuous  Severe COPD with acute exacerbation, 2 L baseline  Diastolic CHF with exacerbation  Bilateral pleural effusions right greater than left; s/p right thora on 4/16 of 800cc's  Suspected CAP from unspecified bacterial organism  MILADY/OHS noncompliant to home nippv   Paroxysmal A-fib on Eliquis  History of DVT  CKD 3/4 baseline creatinine near 2  Neurogenic bladder with intermittent straight catheterizations outpatient  Hypertension  Seizure disorder on  Keppra  Depression/mood disorder  Morbid obesity BMI 40  IDDM2 with neuropathy  Left foot diabetic ulcer, debrided 3/2024, MRSA infection at that time  Chronic anemia, appears secondary to chronic disease baseline hemoglobin near 7-8  History of cognitive decline  History of pneumonia secondary to Pseudomonas in 2020 and MRSA in 2021  Recent COVID-19 pneumonia in March    CT chest with bilateral pleural effusions, findings suggestive of pneumonia, cystic bronchiectasis, outpatient CT chest in approximately 3 months recommended to monitor  Continue high-dose IV diuresis monitor I's and O's renal function Daily weights, Jardiance, patient on GDMT as able  Bnp wnl but falsely low/not accurate in setting of patient's morbid obesity   2/2024 echo - 61%, g1dd, no sig valvular abnormalities. Will defer repeat echo  Troponin trend flat/down, low than historic trend, suspect nstemi 2, no acute st elevation on EKG.  Continue Coreg, aspirin, statin, Eliquis for paroxysmal A-fib, nifedipine  brov/pulm, cont scheduled nebs, steroids, BPH  Bipap for notable hypercapnia, use with naps/prn, home unit reassessed and functioning properly  Recent hospitalization with Pseudomonas infection as well as more remote history, has recent hospital stay also was treated for MRSA diabetic foot infection  Continue IV cefepime for now, sputum with prelim moderate gram-negative bacilli, may warrant tobramycin and more extended antibiotic treatment course depending  Follow-up final cultures and continue to monitor closely  Continue doxycycline course for COPD and may have added benefit additional infectious coverage/diabetic foot does not appear grossly infected at this time however continue local wound care.  Additional KEYLA studies pending as per podiatry/initial case discussed appreciate assistance  Levemir, ssi, jardiance, monitor glucose   Elevated in part from steroids.  Increase/start on a.m. dose of Levemir titrate further based on  steroids/trend  Gi ppx / cont pt's famotidine  Tsat 17, ferritin 117, appears AOCD; may benefit from epo.  hb 7.5 which is patients baseline for past several months, suspect AOCD, will also continue pts home po iron  Recent hospital d/c from [facility] discharge had some bleeding from his foot did require 1 unit PRBCs there, no overt bleeding noted here  Cont finasteride for now straight cath as outpatient, could likely discontinue will defer to urology  Podiatry evaluated for Patient's left heel wound and left lateral plantar wound has notable eschar with surrounding skin irritation no acute drainage  Offload while in bed, Betadine to wounds, follow-up outpatient with podiatry no acute surgical intervention required does not suspect acutely infected foot wound  Continue Keppra  PT/OT  Check a.m. CBC, BMP, magnesium, phosphorus, monitor for toxicities while on cefepime  Continue hospitalization at current level of care        DVT prophylaxis:  Medical DVT prophylaxis orders are present.      Code Status (Patient has no pulse and is not breathing): CPR (Attempt to Resuscitate)  Medical Interventions (Patient has pulse or is breathing): Full Support

## 2024-04-17 NOTE — PROGRESS NOTES
RT EQUIPMENT DEVICE RELATED - SKIN ASSESSMENT    RT Medical Equipment/Device:     NIV Mask:  Under-the-nose   size:  B    Skin Assessment:      Cheek:  Intact  Chin:  Intact  Ears:  Intact  Neck:  Intact  Nose:  Intact  Mouth:  Intact    Device Skin Pressure Protection:   Patient not currently wearing Bipap mask.    Nurse Notification:  Kaylin Jasmine, CRT
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
## 2024-04-17 NOTE — THERAPY TREATMENT NOTE
Patient Name: Preston Wallis  : 1965    MRN: [id number]                              Today's Date: 2024       Admit Date: 2024    Visit Dx:     ICD-10-CM ICD-9-CM   1. Acute respiratory failure with hypercapnia  J96.02 518.81   2. Decreased activities of daily living (ADL)  Z78.9 V49.89   3. Difficulty walking  R26.2 719.7     Patient Active Problem List   Diagnosis    Pneumonia due to COVID-19 virus    Polyneuropathy    Paroxysmal atrial fibrillation    Obstructive sleep apnea    MRSA pneumonia    Low back pain    Chronic diastolic heart failure    Allergies    COPD exacerbation    Chronic anticoagulation    Benign prostatic hyperplasia    Impaired mobility and endurance    Stage 3a chronic kidney disease    Iron deficiency anemia secondary to inadequate dietary iron intake    Vitamin D deficiency    Class 3 severe obesity with serious comorbidity in adult    Lower extremity edema    Elevated alkaline phosphatase level    Venous insufficiency (chronic) (peripheral)    Tobacco abuse, in remission    History of Pseudomonas pneumonia    Chronic dyspnea    Gastroesophageal reflux disease    Bronchiectasis without complication    ERIC (acute kidney injury)    Altered mental status    Hyperlipidemia    Luetscher's syndrome    Neurogenic bladder    Class 1 obesity    Pneumonia due to Pseudomonas species    Seizures    Primary osteoarthritis of left knee    Other constipation    Chronic obstructive pulmonary disease    Type 2 DM with CKD stage 3 and hypertension    Essential hypertension    Stage 3b chronic kidney disease    Annual physical exam    Long-term use of high-risk medication    Personal history of PE (pulmonary embolism)    Encounter for aftercare for healing closed traumatic fracture of left femur    Chronic pain of left knee    Primary osteoarthritis of right knee    Sepsis    Bronchiectasis    Bacterial pneumonia    Anemia    Closed fracture of left tibial plateau    Septic joint    Septic  arthritis    Skin ulcer of left heel, limited to breakdown of skin    Ulcer of left foot, limited to breakdown of skin    Left foot pain    Wheezing    Acute on chronic respiratory failure with hypercapnia    Chronic respiratory failure with hypoxia and hypercapnia     Past Medical History:   Diagnosis Date    Age-related cognitive decline     Allergic contact dermatitis     Allergies     Anemia     Bronchiectasis with acute lower respiratory infection     Charcot foot due to diabetes mellitus 9/10/2013    Chronic diastolic (congestive) heart failure     Chronic kidney disease     Chronic respiratory failure with hypoxia     Closed supracondylar fracture of femur 1/12/2022    COPD (chronic obstructive pulmonary disease)     Deep vein thrombosis (DVT) of lower extremity associated with air travel 1/13/2023    Dependence on supplemental oxygen     Eczema     Erectile dysfunction     due to organic reasons    Essential (primary) hypertension     Fracture     closed fracture of other tarsal and metatarsal bones    Fracture of proximal humerus 1/13/2023    GERD without esophagitis     High risk medication use     Hypercholesteremia     Hypomagnesemia     Infected stasis ulcer of left lower extremity 1/13/2023    Insomnia     Low back pain     Major depressive disorder     Morbid (severe) obesity due to excess calories     MRSA pneumonia     Muscle weakness     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 without evidence of necrosis     Obstructive sleep apnea (adult) (pediatric)     Other forms of dyspnea     Other long term (current) drug therapy     Other specified noninfective gastroenteritis and colitis     Other spondylosis, lumbar region     Pain in both knees     Paroxysmal atrial fibrillation     Peripheral neuropathy     attributed to type 2 diabetes    Pneumonia, unspecified organism     Polyneuropathy     Rash and other nonspecific skin eruption     Syncope and collapse      Tachycardia     Tinnitus 1/13/2023    Type 1 diabetes mellitus with diabetic chronic kidney disease     Type 2 diabetes mellitus     Unspecified fall, initial encounter     Urinary retention      Past Surgical History:   Procedure Laterality Date    CHOLECYSTECTOMY      CYSTOSCOPY      FEMUR SURGERY Left     Shravan placed    KNEE SURGERY Left     OTHER SURGICAL HISTORY Left     venous port, REMOVED    PORTACATH PLACEMENT Right     TIBIAL PLATEAU OPEN REDUCTION INTERNAL FIXATION Left 12/22/2023    Procedure: TIBIAL PLATEAU OPEN REDUCTION INTERNAL FIXATION;  Surgeon: Hugo Kline MD;  Location: [facility];  Service: Orthopedics;  Laterality: Left;    TONSILLECTOMY AND ADENOIDECTOMY        General Information       Row Name 04/17/24 1310          OT Time and Intention    Document Type therapy note (daily note)  -     Mode of Treatment individual therapy;occupational therapy  -               User Key  (r) = Recorded By, (t) = Taken By, (c) = Cosigned By      Initials Name Provider Type     Lisa Miranda OT Occupational Therapist                     Mobility/ADL's    No documentation.                  Obj/Interventions       Row Name 04/17/24 1311          Shoulder (Therapeutic Exercise)    Shoulder (Therapeutic Exercise) strengthening exercise  -     Shoulder Strengthening (Therapeutic Exercise) bilateral;horizontal aBduction/aDduction;left;flexion;resistance band;green;2 sets;10 repetitions  -       Row Name 04/17/24 1311          Elbow/Forearm (Therapeutic Exercise)    Elbow/Forearm (Therapeutic Exercise) strengthening exercise  -     Elbow/Forearm Strengthening (Therapeutic Exercise) bilateral;flexion;extension;resistance band;green;2 sets;10 repetitions  -       Row Name 04/17/24 1311          Motor Skills    Motor Skills functional endurance  -     Functional Endurance Fair-, cues for pacing and PLB technique throughout  -     Therapeutic Exercise shoulder;elbow/forearm  -                User Key  (r) = Recorded By, (t) = Taken By, (c) = Cosigned By      Initials Name Provider Type    Lisa Sheffield OT Occupational Therapist                   Goals/Plan    No documentation.                  Clinical Impression       Row Name 04/17/24 1314          Pain Assessment    Additional Documentation Pain Scale: FACES Pre/Post-Treatment (Group)  -       Row Name 04/17/24 1314          Pain Scale: FACES Pre/Post-Treatment    Pain: FACES Scale, Pretreatment 0-->no hurt  -LF     Posttreatment Pain Rating 0-->no hurt  -LF       Row Name 04/17/24 1314          Plan of Care Review    Plan of Care Reviewed With patient  -LF     Progress improving  -     Outcome Evaluation Patient agreeable to BUE exercises in bed, demonstrating fair- endurance. Mod cues throughout for pacing and PLB technique throughout. He would benefit from continued OT services to maximize independence.  -       Row Name 04/17/24 1314          Vital Signs    O2 Delivery Pre Treatment nasal cannula  -     O2 Delivery Intra Treatment nasal cannula  -     O2 Delivery Post Treatment nasal cannula  -LF       Row Name 04/17/24 1314          Positioning and Restraints    Pre-Treatment Position in bed  -LF     Post Treatment Position bed  -LF     In Bed fowlers;call light within reach;encouraged to call for assist  -               User Key  (r) = Recorded By, (t) = Taken By, (c) = Cosigned By      Initials Name Provider Type     Lisa Miranda OT Occupational Therapist                   Outcome Measures       Row Name 04/17/24 1318          How much help from another is currently needed...    Putting on and taking off regular lower body clothing? 1  -LF     Bathing (including washing, rinsing, and drying) 2  -LF     Toileting (which includes using toilet bed pan or urinal) 1  -LF     Putting on and taking off regular upper body clothing 3  -LF     Taking care of personal grooming (such as brushing teeth) 3  -LF      Eating meals 4  -LF     AM-PAC 6 Clicks Score (OT) 14  -LF       Row Name 04/17/24 1317          Functional Assessment    Outcome Measure Options AM-PAC 6 Clicks Daily Activity (OT);Optimal Instrument  -LF       Row Name 04/17/24 1317          Optimal Instrument    Optimal Instrument Optimal - 3  -LF     Bending/Stooping 4  -LF     Standing 5  -LF     Reaching 3  -LF     From the list, choose the 3 activities you would most like to be able to do without any difficulty Bending/stooping;Standing;Reaching  -LF     Total Score Optimal - 3 12  -LF               User Key  (r) = Recorded By, (t) = Taken By, (c) = Cosigned By      Initials Name Provider Type     Lisa Miranda OT Occupational Therapist                    Occupational Therapy Education       Title: PT OT SLP Therapies (In Progress)       Topic: Occupational Therapy (In Progress)       Point: ADL training (In Progress)       Description:   Instruct learner(s) on proper safety adaptation and remediation techniques during self care or transfers.   Instruct in proper use of assistive devices.                  Learning Progress Summary             Patient Acceptance, E,TB, NR by  at 4/16/2024 1223                         Point: Home exercise program (Not Started)       Description:   Instruct learner(s) on appropriate technique for monitoring, assisting and/or progressing therapeutic exercises/activities.                  Learner Progress:  Not documented in this visit.              Point: Precautions (In Progress)       Description:   Instruct learner(s) on prescribed precautions during self-care and functional transfers.                  Learning Progress Summary             Patient Acceptance, E,TB, NR by  at 4/16/2024 1223                         Point: Body mechanics (In Progress)       Description:   Instruct learner(s) on proper positioning and spine alignment during self-care, functional mobility activities and/or exercises.                   Learning Progress Summary             Patient Acceptance, E,TB, NR by  at 4/16/2024 1223                                         User Key       Initials Effective Dates Name Provider Type Discipline     06/16/21 -  Lisa Miranda OT Occupational Therapist OT                  OT Recommendation and Plan  Planned Therapy Interventions (OT): activity tolerance training, BADL retraining, functional balance retraining, occupation/activity based interventions, patient/caregiver education/training, strengthening exercise, transfer/mobility retraining  Therapy Frequency (OT): 5 times/wk  Plan of Care Review  Plan of Care Reviewed With: patient  Progress: improving  Outcome Evaluation: Patient agreeable to BUE exercises in bed, demonstrating fair- endurance. Mod cues throughout for pacing and PLB technique throughout. He would benefit from continued OT services to maximize independence.     Time Calculation:   Evaluation Complexity (OT)  Review Occupational Profile/Medical/Therapy History Complexity: brief/low complexity  Assessment, Occupational Performance/Identification of Deficit Complexity: 3-5 performance deficits  Clinical Decision Making Complexity (OT): problem focused assessment/low complexity  Overall Complexity of Evaluation (OT): low complexity     Time Calculation- OT       Row Name 04/17/24 1317             Time Calculation- OT    OT Received On 04/17/24  -      OT Goal Re-Cert Due Date 04/25/24  -         Timed Charges    46759 - OT Therapeutic Exercise Minutes 10  -LF         Total Minutes    Timed Charges Total Minutes 10  -LF       Total Minutes 10  -LF                User Key  (r) = Recorded By, (t) = Taken By, (c) = Cosigned By      Initials Name Provider Type     Lisa Miranda OT Occupational Therapist                  Therapy Charges for Today       Code Description Service Date Service Provider Modifiers Qty    [id number]  OT EVAL LOW COMPLEXITY 3 4/16/2024 Lisa Miranda OT GO 1     [id number]  OT THER PROC EA 15 MIN 4/17/2024 Lisa Miranda OT GO 1                 Lisa Miranda OT  4/17/2024

## 2024-04-18 LAB
ANION GAP SERPL CALCULATED.3IONS-SCNC: 8.6 MMOL/L (ref 5–15)
BASOPHILS # BLD AUTO: 0.02 10*3/MM3 (ref 0–0.2)
BASOPHILS NFR BLD AUTO: 0.2 % (ref 0–1.5)
BUN SERPL-MCNC: 47 MG/DL (ref 6–20)
BUN/CREAT SERPL: 20.7 (ref 7–25)
CALCIUM SPEC-SCNC: 8.7 MG/DL (ref 8.6–10.5)
CHLORIDE SERPL-SCNC: 90 MMOL/L (ref 98–107)
CO2 SERPL-SCNC: 38.4 MMOL/L (ref 22–29)
CREAT SERPL-MCNC: 2.27 MG/DL (ref 0.76–1.27)
CYTO UR: NORMAL
DEPRECATED RDW RBC AUTO: 52.2 FL (ref 37–54)
EGFRCR SERPLBLD CKD-EPI 2021: 32.6 ML/MIN/1.73
EOSINOPHIL # BLD AUTO: 0 10*3/MM3 (ref 0–0.4)
EOSINOPHIL NFR BLD AUTO: 0 % (ref 0.3–6.2)
ERYTHROCYTE [DISTWIDTH] IN BLOOD BY AUTOMATED COUNT: 15.9 % (ref 12.3–15.4)
GLUCOSE BLDC GLUCOMTR-MCNC: 159 MG/DL (ref 70–99)
GLUCOSE BLDC GLUCOMTR-MCNC: 268 MG/DL (ref 70–99)
GLUCOSE BLDC GLUCOMTR-MCNC: 342 MG/DL (ref 70–99)
GLUCOSE BLDC GLUCOMTR-MCNC: 416 MG/DL (ref 70–99)
GLUCOSE SERPL-MCNC: 215 MG/DL (ref 65–99)
HCT VFR BLD AUTO: 27 % (ref 37.5–51)
HGB BLD-MCNC: 8.2 G/DL (ref 13–17.7)
IMM GRANULOCYTES # BLD AUTO: 0.16 10*3/MM3 (ref 0–0.05)
IMM GRANULOCYTES NFR BLD AUTO: 1.5 % (ref 0–0.5)
LAB AP CASE REPORT: NORMAL
LAB AP CLINICAL INFORMATION: NORMAL
LYMPHOCYTES # BLD AUTO: 1.41 10*3/MM3 (ref 0.7–3.1)
LYMPHOCYTES NFR BLD AUTO: 13.6 % (ref 19.6–45.3)
MAGNESIUM SERPL-MCNC: 2.2 MG/DL (ref 1.6–2.6)
MCH RBC QN AUTO: 27.1 PG (ref 26.6–33)
MCHC RBC AUTO-ENTMCNC: 30.4 G/DL (ref 31.5–35.7)
MCV RBC AUTO: 89.1 FL (ref 79–97)
MONOCYTES # BLD AUTO: 1.29 10*3/MM3 (ref 0.1–0.9)
MONOCYTES NFR BLD AUTO: 12.5 % (ref 5–12)
NEUTROPHILS NFR BLD AUTO: 7.45 10*3/MM3 (ref 1.7–7)
NEUTROPHILS NFR BLD AUTO: 72.2 % (ref 42.7–76)
NRBC BLD AUTO-RTO: 0 /100 WBC (ref 0–0.2)
PATH REPORT.FINAL DX SPEC: NORMAL
PATH REPORT.GROSS SPEC: NORMAL
PHOSPHATE SERPL-MCNC: 3 MG/DL (ref 2.5–4.5)
PLATELET # BLD AUTO: 303 10*3/MM3 (ref 140–450)
PMV BLD AUTO: 9.1 FL (ref 6–12)
POTASSIUM SERPL-SCNC: 4.3 MMOL/L (ref 3.5–5.2)
RBC # BLD AUTO: 3.03 10*6/MM3 (ref 4.14–5.8)
SODIUM SERPL-SCNC: 137 MMOL/L (ref 136–145)
WBC NRBC COR # BLD AUTO: 10.33 10*3/MM3 (ref 3.4–10.8)

## 2024-04-18 PROCEDURE — 94664 DEMO&/EVAL PT USE INHALER: CPT

## 2024-04-18 PROCEDURE — 63710000001 PREDNISONE PER 1 MG: Performed by: INTERNAL MEDICINE

## 2024-04-18 PROCEDURE — 80048 BASIC METABOLIC PNL TOTAL CA: CPT | Performed by: INTERNAL MEDICINE

## 2024-04-18 PROCEDURE — 63710000001 INSULIN LISPRO (HUMAN) PER 5 UNITS: Performed by: FAMILY MEDICINE

## 2024-04-18 PROCEDURE — 63710000001 INSULIN DETEMIR PER 5 UNITS: Performed by: INTERNAL MEDICINE

## 2024-04-18 PROCEDURE — 97530 THERAPEUTIC ACTIVITIES: CPT

## 2024-04-18 PROCEDURE — 82948 REAGENT STRIP/BLOOD GLUCOSE: CPT

## 2024-04-18 PROCEDURE — 94799 UNLISTED PULMONARY SVC/PX: CPT

## 2024-04-18 PROCEDURE — 25010000002 BUMETANIDE PER 0.5 MG: Performed by: INTERNAL MEDICINE

## 2024-04-18 PROCEDURE — 94761 N-INVAS EAR/PLS OXIMETRY MLT: CPT

## 2024-04-18 PROCEDURE — 85025 COMPLETE CBC W/AUTO DIFF WBC: CPT | Performed by: INTERNAL MEDICINE

## 2024-04-18 PROCEDURE — 25010000002 METHYLPREDNISOLONE PER 40 MG: Performed by: FAMILY MEDICINE

## 2024-04-18 PROCEDURE — 99232 SBSQ HOSP IP/OBS MODERATE 35: CPT | Performed by: INTERNAL MEDICINE

## 2024-04-18 PROCEDURE — 84100 ASSAY OF PHOSPHORUS: CPT | Performed by: INTERNAL MEDICINE

## 2024-04-18 PROCEDURE — 25010000002 CEFEPIME PER 500 MG: Performed by: INTERNAL MEDICINE

## 2024-04-18 PROCEDURE — 99233 SBSQ HOSP IP/OBS HIGH 50: CPT | Performed by: INTERNAL MEDICINE

## 2024-04-18 PROCEDURE — 83735 ASSAY OF MAGNESIUM: CPT | Performed by: INTERNAL MEDICINE

## 2024-04-18 RX ORDER — PREDNISONE 20 MG/1
40 TABLET ORAL
Status: DISCONTINUED | OUTPATIENT
Start: 2024-04-18 | End: 2024-04-18

## 2024-04-18 RX ORDER — PREDNISONE 20 MG/1
40 TABLET ORAL
Status: DISCONTINUED | OUTPATIENT
Start: 2024-04-19 | End: 2024-04-19 | Stop reason: HOSPADM

## 2024-04-18 RX ORDER — METOLAZONE 5 MG/1
5 TABLET ORAL ONCE
Status: COMPLETED | OUTPATIENT
Start: 2024-04-18 | End: 2024-04-18

## 2024-04-18 RX ADMIN — APIXABAN 5 MG: 5 TABLET, FILM COATED ORAL at 03:46

## 2024-04-18 RX ADMIN — INSULIN LISPRO 8 UNITS: 100 INJECTION, SOLUTION INTRAVENOUS; SUBCUTANEOUS at 12:24

## 2024-04-18 RX ADMIN — GUAIFENESIN 1200 MG: 600 TABLET ORAL at 08:40

## 2024-04-18 RX ADMIN — BUMETANIDE 2 MG: 0.25 INJECTION INTRAMUSCULAR; INTRAVENOUS at 15:22

## 2024-04-18 RX ADMIN — APIXABAN 5 MG: 5 TABLET, FILM COATED ORAL at 15:22

## 2024-04-18 RX ADMIN — FAMOTIDINE 40 MG: 20 TABLET ORAL at 06:30

## 2024-04-18 RX ADMIN — CEFEPIME 2000 MG: 2 INJECTION, POWDER, FOR SOLUTION INTRAVENOUS at 08:38

## 2024-04-18 RX ADMIN — IPRATROPIUM BROMIDE AND ALBUTEROL SULFATE 3 ML: .5; 3 SOLUTION RESPIRATORY (INHALATION) at 19:29

## 2024-04-18 RX ADMIN — DOXYCYCLINE 100 MG: 100 CAPSULE ORAL at 21:19

## 2024-04-18 RX ADMIN — MONTELUKAST 10 MG: 10 TABLET, FILM COATED ORAL at 21:19

## 2024-04-18 RX ADMIN — Medication 10 ML: at 21:20

## 2024-04-18 RX ADMIN — INSULIN LISPRO 3 UNITS: 100 INJECTION, SOLUTION INTRAVENOUS; SUBCUTANEOUS at 08:38

## 2024-04-18 RX ADMIN — PREDNISONE 40 MG: 20 TABLET ORAL at 10:59

## 2024-04-18 RX ADMIN — Medication 400 MG: at 08:39

## 2024-04-18 RX ADMIN — GUAIFENESIN 1200 MG: 600 TABLET ORAL at 21:21

## 2024-04-18 RX ADMIN — BUDESONIDE 0.5 MG: 0.5 INHALANT RESPIRATORY (INHALATION) at 07:03

## 2024-04-18 RX ADMIN — TRAZODONE HYDROCHLORIDE 50 MG: 50 TABLET ORAL at 21:19

## 2024-04-18 RX ADMIN — METHYLPREDNISOLONE SODIUM SUCCINATE 40 MG: 40 INJECTION INTRAMUSCULAR; INTRAVENOUS at 08:39

## 2024-04-18 RX ADMIN — IPRATROPIUM BROMIDE AND ALBUTEROL SULFATE 3 ML: .5; 3 SOLUTION RESPIRATORY (INHALATION) at 11:41

## 2024-04-18 RX ADMIN — INSULIN LISPRO 14 UNITS: 100 INJECTION, SOLUTION INTRAVENOUS; SUBCUTANEOUS at 21:20

## 2024-04-18 RX ADMIN — IPRATROPIUM BROMIDE AND ALBUTEROL SULFATE 3 ML: .5; 3 SOLUTION RESPIRATORY (INHALATION) at 07:03

## 2024-04-18 RX ADMIN — ARFORMOTEROL TARTRATE 15 MCG: 15 SOLUTION RESPIRATORY (INHALATION) at 07:03

## 2024-04-18 RX ADMIN — DOXYCYCLINE 100 MG: 100 CAPSULE ORAL at 08:38

## 2024-04-18 RX ADMIN — EMPAGLIFLOZIN 10 MG: 10 TABLET, FILM COATED ORAL at 08:39

## 2024-04-18 RX ADMIN — BUMETANIDE 2 MG: 0.25 INJECTION INTRAMUSCULAR; INTRAVENOUS at 08:39

## 2024-04-18 RX ADMIN — ATORVASTATIN CALCIUM 20 MG: 20 TABLET, FILM COATED ORAL at 21:24

## 2024-04-18 RX ADMIN — FINASTERIDE 5 MG: 5 TABLET, FILM COATED ORAL at 08:39

## 2024-04-18 RX ADMIN — NIFEDIPINE 30 MG: 30 TABLET, FILM COATED, EXTENDED RELEASE ORAL at 08:39

## 2024-04-18 RX ADMIN — BUMETANIDE 2 MG: 0.25 INJECTION INTRAMUSCULAR; INTRAVENOUS at 21:19

## 2024-04-18 RX ADMIN — ASPIRIN 81 MG: 81 TABLET, COATED ORAL at 08:39

## 2024-04-18 RX ADMIN — Medication 10 ML: at 08:40

## 2024-04-18 RX ADMIN — LEVETIRACETAM 500 MG: 500 TABLET, FILM COATED ORAL at 08:40

## 2024-04-18 RX ADMIN — INSULIN DETEMIR 10 UNITS: 100 INJECTION, SOLUTION SUBCUTANEOUS at 08:39

## 2024-04-18 RX ADMIN — BUDESONIDE 0.5 MG: 0.5 INHALANT RESPIRATORY (INHALATION) at 19:29

## 2024-04-18 RX ADMIN — CEFEPIME 2000 MG: 2 INJECTION, POWDER, FOR SOLUTION INTRAVENOUS at 21:18

## 2024-04-18 RX ADMIN — METOLAZONE 5 MG: 5 TABLET ORAL at 10:59

## 2024-04-18 RX ADMIN — CARVEDILOL 25 MG: 25 TABLET, FILM COATED ORAL at 21:19

## 2024-04-18 RX ADMIN — FERROUS SULFATE TAB 325 MG (65 MG ELEMENTAL FE) 325 MG: 325 (65 FE) TAB at 12:25

## 2024-04-18 RX ADMIN — INSULIN DETEMIR 10 UNITS: 100 INJECTION, SOLUTION SUBCUTANEOUS at 21:20

## 2024-04-18 RX ADMIN — CARVEDILOL 25 MG: 25 TABLET, FILM COATED ORAL at 08:39

## 2024-04-18 RX ADMIN — ARFORMOTEROL TARTRATE 15 MCG: 15 SOLUTION RESPIRATORY (INHALATION) at 19:29

## 2024-04-18 RX ADMIN — INSULIN LISPRO 10 UNITS: 100 INJECTION, SOLUTION INTRAVENOUS; SUBCUTANEOUS at 17:30

## 2024-04-18 RX ADMIN — Medication 1 MG: at 08:38

## 2024-04-18 RX ADMIN — DULOXETINE HYDROCHLORIDE 30 MG: 30 CAPSULE, DELAYED RELEASE ORAL at 08:39

## 2024-04-18 RX ADMIN — LEVETIRACETAM 500 MG: 500 TABLET, FILM COATED ORAL at 21:19

## 2024-04-18 NOTE — PROGRESS NOTES
[facility]   Hospitalist Progress Note    Date of admission: 4/14/2024  Patient Name: Preston Wallis  1965  Date: 4/18/2024      Subjective     Chief Complaint   Patient presents with   • Shortness of Breath       Interval Followup:   Patient states that he is feeling much better today  He states he will go home tomorrow  Patient continues to undergo diuresis.  Overall edema has improved      Objective     Vitals:   Temp:  [97.5 °F (36.4 °C)-98.4 °F (36.9 °C)] 97.5 °F (36.4 °C)  Heart Rate:  [70-87] 72  Resp:  [16-20] 20  BP: (138-155)/(52-79) 138/63  Flow (L/min):  [3] 3    Physical Exam  General: alert no distress, sitting on side of bed with wife at the bedside   Lungs: Clear, no wheezing   CV: RRR lower extremity pitting edema improved   Obese abdomen nontender  ANO x 3 much more appropriate overall  Lower extremity diabetic foot ulcer dressed    Result Review:  Vital signs, labs and recent relevant imaging reviewed.      •  acetaminophen  •  albuterol  •  aluminum-magnesium hydroxide-simethicone  •  benzonatate  •  senna-docusate sodium **AND** polyethylene glycol **AND** bisacodyl **AND** bisacodyl  •  busPIRone  •  dextrose  •  dextrose  •  Diclofenac Sodium  •  glucagon (human recombinant)  •  guaiFENesin-dextromethorphan  •  hydrOXYzine  •  Lidocaine  •  melatonin  •  nicotine  •  ondansetron  •  ondansetron  •  Pharmacy to Dose Cefepime  •  prochlorperazine  •  sodium chloride  •  sodium chloride  •  sodium chloride  •  sodium chloride    apixaban, 5 mg, Oral, Q12H  arformoterol, 15 mcg, Nebulization, BID - RT  aspirin, 81 mg, Oral, Daily  atorvastatin, 20 mg, Oral, Nightly  budesonide, 0.5 mg, Nebulization, BID - RT  bumetanide, 2 mg, Intravenous, TID  carvedilol, 25 mg, Oral, Q12H  cefepime, 2,000 mg, Intravenous, Q12H  doxycycline, 100 mg, Oral, Q12H  DULoxetine, 30 mg, Oral, Daily  empagliflozin, 10 mg, Oral, Daily  famotidine, 40 mg, Oral, QAM  ferrous sulfate, 325 mg, Oral, Daily With  Lunch  finasteride, 5 mg, Oral, Daily  folic acid, 1 mg, Oral, Daily  guaiFENesin, 1,200 mg, Oral, Q12H  insulin detemir, 10 Units, Subcutaneous, BID  insulin lispro, 3-14 Units, Subcutaneous, 4x Daily AC & at Bedtime  ipratropium-albuterol, 3 mL, Nebulization, Q6H - RT  levETIRAcetam, 500 mg, Oral, BID  magnesium oxide, 400 mg, Oral, Daily  montelukast, 10 mg, Oral, Nightly  NIFEdipine XL, 30 mg, Oral, QAM  [START ON 4/19/2024] predniSONE, 40 mg, Oral, Daily With Breakfast  sodium chloride, 10 mL, Intravenous, Q12H  traZODone, 50 mg, Oral, Nightly        XR Chest 1 View    Result Date: 4/16/2024  Impression:  1. No pneumothorax status post thoracentesis 2. Stable multifocal airspace disease   Electronically Signed By-Derrick Cortez On:4/16/2024 3:30 PM      CT Chest Without Contrast Diagnostic    Result Date: 4/15/2024  1.  Moderate bilateral pleural effusions. Bibasilar opacity right greater than left. Pneumonia most likely. 2.  Noncalcified nodule in the lingula measuring 9 mm. Scattered areas of groundglass with mild septal thickening likely due to superimposed pulmonary edema. 3.  Severe varicose and cystic bronchiectasis. 4.  New opacity in the lateral right upper lobe atelectasis versus pneumonia. 5.  A follow-up CT chest in 3 months time is recommended to evaluate for the resolution of the noncalcified nodules and parenchymal opacities.  Electronically Signed By-Francine Garner MD On:4/15/2024 5:19 PM      XR Chest 1 View    Result Date: 4/14/2024  Chronic changes in both lungs are similar to prior. Increased interstitial opacities bilaterally may reflect superimposed pulmonary edema. A small amount of right pleural fluid may also be present.  Electronically Signed By-Dr. Edison Guthrie MD On:4/14/2024 7:37 PM      XR Chest 1 View    Result Date: 4/13/2024  There has been no significant interval change.     CBC          4/16/2024    03:53 4/17/2024    03:59 4/18/2024    03:45   CBC   WBC 6.52  12.62  10.33     RBC 2.88  2.91  3.03    Hemoglobin 7.9  7.8  8.2    Hematocrit 26.5  26.6  27.0    MCV 92.0  91.4  89.1    MCH 27.4  26.8  27.1    MCHC 29.8  29.3  30.4    RDW 15.8  16.0  15.9    Platelets 334  319  303      CMP          4/16/2024    03:53 4/17/2024    03:59 4/18/2024    03:45   CMP   Glucose 224  154  215    BUN 29  38  47    Creatinine 1.72  2.19  2.27    EGFR 45.5  34.1  32.6    Sodium 137  135  137    Potassium 4.8  4.6  4.3    Chloride 94  90  90    Calcium 8.2  8.5  8.7    BUN/Creatinine Ratio 16.9  17.4  20.7    Anion Gap 6.7  8.4  8.6        Assessment / Plan     Summary: 58-year-old male with severe COPD FEV1 26%, paroxysmal A-fib on Eliquis, diastolic CHF, MILADY with poor adherence to nippv, who presented with acutely worsening shortness of breath over the past week.  Pulmonology consulted for assistance, underwent thoracentesis 4/16 800 cc on the right    Assessment/Plan (clinically significant if listed here)  AHHRF requiring bipap continuous  Severe COPD with acute exacerbation, 2 L baseline  Diastolic CHF with exacerbation  Bilateral pleural effusions right greater than left; s/p right thora on 4/16 of 800cc's  Suspected CAP from unspecified bacterial organism  MILADY/OHS noncompliant to home nippv   Paroxysmal A-fib on Eliquis  History of DVT  CKD 3/4 baseline creatinine near 2  Neurogenic bladder with intermittent straight catheterizations outpatient  Hypertension  Seizure disorder on Keppra  Depression/mood disorder  Morbid obesity BMI 40  IDDM2 with neuropathy  Left foot diabetic ulcer, debrided 3/2024, MRSA infection at that time  Chronic anemia, appears secondary to chronic disease baseline hemoglobin near 7-8  History of cognitive decline  History of pneumonia secondary to Pseudomonas in 2020 and MRSA in 2021  Recent COVID-19 pneumonia in March    PLAN   CT chest with bilateral pleural effusions, findings suggestive of pneumonia, cystic bronchiectasis, outpatient CT chest in approximately 3 months  recommended to monitor  Continue  IV diuresis monitor I's and O's renal function Daily weights, Jardiance, patient on GDMT as able  Bnp wnl but falsely low/not accurate in setting of patient's morbid obesity   2/2024 echo - 61%, g1dd, no sig valvular abnormalities. Will defer repeat echo  Troponin trend flat/down, low than historic trend, suspect nstemi 2, no acute st elevation on EKG.  Continue Coreg, aspirin, statin, Eliquis for paroxysmal A-fib, nifedipine  brov/pulm, cont scheduled nebs, steroids, BPH  Continue home bipap machine   Recent hospitalization with Pseudomonas infection as well as more remote history, has recent hospital stay also was treated for MRSA diabetic foot infection  Continue IV cefepime for now, sputum with prelim moderate gram-negative bacilli, may warrant tobramycin and more extended antibiotic treatment course depending  Follow-up final cultures and continue to monitor closely  Continue doxycycline course for COPD   Levemir, ssi, jardiance, monitor glucose   Gi ppx / cont pt's famotidine  Tsat 17, ferritin 117, appears AOCD; may benefit from epo.  hb 7.5 which is patients baseline for past several months, suspect AOCD, will also continue pts home po iron  Recent hospital d/c from [facility] discharge had some bleeding from his foot did require 1 unit PRBCs there, no overt bleeding noted here  Cont finasteride for now straight cath as outpatient, could likely discontinue will defer to urology  Podiatry evaluated for Patient's left heel wound and left lateral plantar wound has notable eschar with surrounding skin irritation no acute drainage  Offload while in bed, Betadine to wounds, follow-up outpatient with podiatry no acute surgical intervention required does not suspect acutely infected foot wound  Continue Keppra  PT/OT  Check a.m. CBC, BMP, magnesium, phosphorus, monitor for toxicities while on cefepime  Continue hospitalization at current level of care        DVT prophylaxis:  Medical DVT  prophylaxis orders are present.      Code Status (Patient has no pulse and is not breathing): CPR (Attempt to Resuscitate)  Medical Interventions (Patient has pulse or is breathing): Full Support

## 2024-04-18 NOTE — PLAN OF CARE
Goal Outcome Evaluation:  Plan of Care Reviewed With: patient        Progress: no change  Outcome Evaluation: Pt wore home unit last night. V60 on standby at this time. Pt currently on a 3L nasal cannla resting comfortably.

## 2024-04-18 NOTE — PROGRESS NOTES
RT EQUIPMENT DEVICE RELATED - SKIN ASSESSMENT    RT Medical Equipment/Device:     NIV Mask:  Under-the-nose   size:       Skin Assessment:      Cheek:  Intact  Chin:  Intact  Nose:  Intact  Mouth:  Intact    Device Skin Pressure Protection:  Pressure points protected    Nurse Notification:  Kaylin Harley, RRT

## 2024-04-18 NOTE — PLAN OF CARE
Problem: Adult Inpatient Plan of Care  Goal: Plan of Care Review  Outcome: Ongoing, Progressing   Goal Outcome Evaluation:      No acute events during the shift. Wound care done per orders. VSS. Patient resting comfortably at end of shift.

## 2024-04-18 NOTE — THERAPY TREATMENT NOTE
Acute Care - Physical Therapy Treatment Note  [facility]     Patient Name: Preston Wallis  : 1965  MRN: [id number]  Today's Date: 2024      Visit Dx:     ICD-10-CM ICD-9-CM   1. Acute respiratory failure with hypercapnia  J96.02 518.81   2. Decreased activities of daily living (ADL)  Z78.9 V49.89   3. Difficulty walking  R26.2 719.7     Patient Active Problem List   Diagnosis    Pneumonia due to COVID-19 virus    Polyneuropathy    Paroxysmal atrial fibrillation    Obstructive sleep apnea    MRSA pneumonia    Low back pain    Chronic diastolic heart failure    Allergies    COPD exacerbation    Chronic anticoagulation    Benign prostatic hyperplasia    Impaired mobility and endurance    Stage 3a chronic kidney disease    Iron deficiency anemia secondary to inadequate dietary iron intake    Vitamin D deficiency    Class 3 severe obesity with serious comorbidity in adult    Lower extremity edema    Elevated alkaline phosphatase level    Venous insufficiency (chronic) (peripheral)    Tobacco abuse, in remission    History of Pseudomonas pneumonia    Chronic dyspnea    Gastroesophageal reflux disease    Bronchiectasis without complication    ERIC (acute kidney injury)    Altered mental status    Hyperlipidemia    Luetscher's syndrome    Neurogenic bladder    Class 1 obesity    Pneumonia due to Pseudomonas species    Seizures    Primary osteoarthritis of left knee    Other constipation    Chronic obstructive pulmonary disease    Type 2 DM with CKD stage 3 and hypertension    Essential hypertension    Stage 3b chronic kidney disease    Annual physical exam    Long-term use of high-risk medication    Personal history of PE (pulmonary embolism)    Encounter for aftercare for healing closed traumatic fracture of left femur    Chronic pain of left knee    Primary osteoarthritis of right knee    Sepsis    Bronchiectasis    Bacterial pneumonia    Anemia    Closed fracture of left tibial plateau    Septic joint    Septic  arthritis    Skin ulcer of left heel, limited to breakdown of skin    Ulcer of left foot, limited to breakdown of skin    Left foot pain    Wheezing    Acute on chronic respiratory failure with hypercapnia    Chronic respiratory failure with hypoxia and hypercapnia     Past Medical History:   Diagnosis Date    Age-related cognitive decline     Allergic contact dermatitis     Allergies     Anemia     Bronchiectasis with acute lower respiratory infection     Charcot foot due to diabetes mellitus 9/10/2013    Chronic diastolic (congestive) heart failure     Chronic kidney disease     Chronic respiratory failure with hypoxia     Closed supracondylar fracture of femur 1/12/2022    COPD (chronic obstructive pulmonary disease)     Deep vein thrombosis (DVT) of lower extremity associated with air travel 1/13/2023    Dependence on supplemental oxygen     Eczema     Erectile dysfunction     due to organic reasons    Essential (primary) hypertension     Fracture     closed fracture of other tarsal and metatarsal bones    Fracture of proximal humerus 1/13/2023    GERD without esophagitis     High risk medication use     Hypercholesteremia     Hypomagnesemia     Infected stasis ulcer of left lower extremity 1/13/2023    Insomnia     Low back pain     Major depressive disorder     Morbid (severe) obesity due to excess calories     MRSA pneumonia     Muscle weakness     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 without evidence of necrosis     Obstructive sleep apnea (adult) (pediatric)     Other forms of dyspnea     Other long term (current) drug therapy     Other specified noninfective gastroenteritis and colitis     Other spondylosis, lumbar region     Pain in both knees     Paroxysmal atrial fibrillation     Peripheral neuropathy     attributed to type 2 diabetes    Pneumonia, unspecified organism     Polyneuropathy     Rash and other nonspecific skin eruption     Syncope and collapse      Tachycardia     Tinnitus 1/13/2023    Type 1 diabetes mellitus with diabetic chronic kidney disease     Type 2 diabetes mellitus     Unspecified fall, initial encounter     Urinary retention      Past Surgical History:   Procedure Laterality Date    CHOLECYSTECTOMY      CYSTOSCOPY      FEMUR SURGERY Left     Shravan placed    KNEE SURGERY Left     OTHER SURGICAL HISTORY Left     venous port, REMOVED    PORTACATH PLACEMENT Right     TIBIAL PLATEAU OPEN REDUCTION INTERNAL FIXATION Left 12/22/2023    Procedure: TIBIAL PLATEAU OPEN REDUCTION INTERNAL FIXATION;  Surgeon: Hugo Kline MD;  [facility];  Service: Orthopedics;  Laterality: Left;    TONSILLECTOMY AND ADENOIDECTOMY       PT Assessment (Last 12 Hours)       PT Evaluation and Treatment       Row Name 04/18/24 1131          Physical Therapy Time and Intention    Subjective Information complains of;weakness (P)   -     Document Type therapy note (daily note) (P)   -     Mode of Treatment physical therapy;individual therapy (P)   -     Patient Effort adequate (P)   -     Symptoms Noted During/After Treatment fatigue (P)   -       Row Name 04/18/24 1131          Pain Scale: FACES Pre/Post-Treatment    Pain: FACES Scale, Pretreatment 0-->no hurt (P)   -     Posttreatment Pain Rating 0-->no hurt (P)   -       Row Name 04/18/24 1131          Mobility    Extremity Weight-bearing Status left lower extremity (P)   -SM     Left Lower Extremity (Weight-bearing Status) non weight-bearing (NWB) (P)   -       Row Name 04/18/24 1131          Bed Mobility    Bed Mobility supine-sit-supine (P)   -     Supine-Sit-Supine [place] (Bed Mobility) minimum assist (75% patient effort);2 person assist (P)   -     Bed Mobility, Safety Issues decreased use of arms for pushing/pulling;decreased use of legs for bridging/pushing (P)   -     Assistive Device (Bed Mobility) bed rails (P)   -       Row Name 04/18/24 1131          Transfers     Transfers sit-stand transfer;stand-sit transfer (P)   -SM     Maintains Weight-bearing Status (Transfers) unable to maintain weight-bearing status (P)   -SM     Comment, (Transfers) Pt performed sit to stand w/ TTWB on LLE, pt refused to lift L foot to follow NWB after VC's. (P)   -SM       Row Name 04/18/24 1131          Sit-Stand Transfer    Sit-Stand [place] (Transfers) moderate assist (50% patient effort);2 person assist (P)   -SM     Assistive Device (Sit-Stand Transfers) walker, front-wheeled (P)   -SM       Row Name 04/18/24 1131          Stand-Sit Transfer    Stand-Sit [place] (Transfers) moderate assist (50% patient effort);2 person assist (P)   -     Assistive Device (Stand-Sit Transfers) walker, front-wheeled (P)   -SM       Row Name 04/18/24 1131          Safety Issues, Functional Mobility    Safety Issues Affecting Function (Mobility) unable to maintain weight-bearing restrictions (P)   -SM     Impairments Affecting Function (Mobility) balance;endurance/activity tolerance;pain;strength;shortness of breath (P)   -SM       Row Name             Wound Left gluteal    Wound - Properties Group Present on Original Admission: Y  -KN Side: Left  -KN Location: gluteal  -KN    Retired Wound - Properties Group Present on Original Admission: Y  -KN Side: Left  -KN Location: gluteal  -KN    Retired Wound - Properties Group Present on Original Admission: Y  -KN Side: Left  -KN Location: gluteal  -KN      Row Name             Wound 12/22/23 1322 Left posterior heel Pressure Injury    Wound - Properties Group Placement Date: 12/22/23 -LH Placement Time: 1322 -LH Side: Left  -LH Orientation: posterior  -LH Location: heel  -LH Primary Wound Type: Pressure inj  -LH    Retired Wound - Properties Group Placement Date: 12/22/23  - Placement Time: 1322 -LH Side: Left  -LH Orientation: posterior  -LH Location: heel  -LH Primary Wound Type: Pressure inj  -LH    Retired Wound - Properties Group Date first  assessed: 12/22/23  - Time first assessed: 1322  -LH Side: Left  -LH Location: heel  -LH Primary Wound Type: Pressure inj  -LH      Row Name             Wound 02/16/24 1700 Left posterior foot Pressure Injury    Wound - Properties Group Placement Date: 02/16/24  -AG Placement Time: 1700  -AG Side: Left  -AG Orientation: posterior  -AG Location: foot  -AG Primary Wound Type: Pressure inj  -AG    Retired Wound - Properties Group Placement Date: 02/16/24  -AG Placement Time: 1700  -AG Side: Left  -AG Orientation: posterior  -AG Location: foot  -AG Primary Wound Type: Pressure inj  -AG    Retired Wound - Properties Group Date first assessed: 02/16/24  -AG Time first assessed: 1700  -AG Side: Left  -AG Location: foot  -AG Primary Wound Type: Pressure inj  -AG      Row Name 04/18/24 1131          Positioning and Restraints    Post Treatment Position bed (P)   -SM     In Bed sitting EOB;call light within reach;encouraged to call for assist;with family/caregiver;notified nsg (P)   -SM       Row Name 04/18/24 1131          Progress Summary (PT)    Progress Toward Functional Goals (PT) progress toward functional goals is fair (P)   -SM               User Key  (r) = Recorded By, (t) = Taken By, (c) = Cosigned By      Initials Name Provider Type     Carmen Nava RN Registered Nurse    Maria Isabel Sylvester, RN Registered Nurse    Cindy Lorenzo, RN Registered Nurse    Grecia Hannah, PTA Student PTA Student                    Physical Therapy Education       Title: PT OT SLP Therapies (In Progress)       Topic: Physical Therapy (In Progress)       Point: Mobility training (Done)       Learning Progress Summary             Patient Acceptance, E,TB, VU by AV at 4/16/2024 2912                         Point: Home exercise program (Not Started)       Learner Progress:  Not documented in this visit.              Point: Body mechanics (Done)       Learning Progress Summary             Patient Acceptance, E,TB, VU by AV at  4/16/2024 1509                         Point: Precautions (Done)       Learning Progress Summary             Patient Acceptance, E,TB, VU by AV at 4/16/2024 1509                                         User Key       Initials Effective Dates Name Provider Type Discipline     06/11/21 -  Andrea Ruiz, PT Physical Therapist PT                  PT Recommendation and Plan     Progress Summary (PT)  Progress Toward Functional Goals (PT): (P) progress toward functional goals is fair   Outcome Measures       Row Name 04/18/24 1100 04/16/24 1500          How much help from another person do you currently need...    Turning from your back to your side while in flat bed without using bedrails? 3 (P)   -SM 3  -AV     Moving from lying on back to sitting on the side of a flat bed without bedrails? 2 (P)   -SM 2  -AV     Moving to and from a bed to a chair (including a wheelchair)? 2 (P)   -SM 2  -AV     Standing up from a chair using your arms (e.g., wheelchair, bedside chair)? 2 (P)   -SM 2  -AV     Climbing 3-5 steps with a railing? 1 (P)   -SM 1  -AV     To walk in hospital room? 1 (P)   -SM 1  -AV     AM-PAC 6 Clicks Score (PT) 11 (P)   -SM 11  -AV     Highest Level of Mobility Goal 4 --> Transfer to chair/commode (P)   -SM 4 --> Transfer to chair/commode  -AV        Functional Assessment    Outcome Measure Options -- AM-PAC 6 Clicks Basic Mobility (PT)  -AV               User Key  (r) = Recorded By, (t) = Taken By, (c) = Cosigned By      Initials Name Provider Type    AV Andrea Ruiz, PT Physical Therapist    Grecia Hannah, PTA Student PTA Student                     Time Calculation:         PT G-Codes  Outcome Measure Options: AM-PAC 6 Clicks Daily Activity (OT), Optimal Instrument  AM-PAC 6 Clicks Score (PT): (P) 11  AM-PAC 6 Clicks Score (OT): 14    Grecia Dos Santos PTA Student  4/18/2024

## 2024-04-18 NOTE — PLAN OF CARE
Goal Outcome Evaluation:  Plan of Care Reviewed With: patient        Progress: no change  Outcome Evaluation: VSS. No complaint of pain. On 3L NC humidified. Wore home bipap tonight. Doyle draining yellow urine. No bowel movement. BGL monitored. Insulin given per sliding scale. Dressing on left foot remain clean, dry, and intact.

## 2024-04-18 NOTE — CONSULTS
Mr. Wallis has a history of bronchiectasis visualized on CT 4/15/24 and daily productive cough for more than six months. He has been using the [employer] OPEP but it has failed to mobilize secretions. He will need to have HFCWO at home to loosen and help loosen and mobilize secretions.

## 2024-04-18 NOTE — PROGRESS NOTES
Pulmonary / Critical Care Progress Note      Patient Name: Preston Wallis  : 1965  MRN: [id number]  Primary Care Physician:  Kimmy Riley MD  Date of admission: 2024    Subjective   Subjective   Follow-up for Acute on chronic hypercapnic respiratory failure requiring NIPPV, CHF exacerbation    No acute events overnight.  Wore NIPPV.    This morning,  Sitting on side of bed  Remains on 3 L nasal cannula  Pleural fluid transudate consistent with volume overload  Volume status improved however creatinine uptrending  Sodium normalized  Dyspnea and cough better  Still with some orthopnea  No nausea, fevers or chills    Objective   Objective     Vitals:   Temp:  [97.7 °F (36.5 °C)-98.4 °F (36.9 °C)] 98.4 °F (36.9 °C)  Heart Rate:  [70-87] 71  Resp:  [16-20] 16  BP: (143-155)/(52-79) 143/52  Flow (L/min):  [3] 3      Physical Exam   Vital Signs Reviewed   General: WDWN, Alert, NAD.  Chronically ill-appearing male, sitting on side of bed  HEENT:  PERRL, EOMI.  OP, nares clear, no sinus tenderness  Neck:  Supple, no JVD, no thyromegaly  Chest: Improved aeration with bibasilar crackles, no work of breathing noted  CV: RRR, no MGR, pulses 2+, equal.  Abd:  Soft, NT, ND, + BS, no HSM, obese  EXT:  no clubbing, no cyanosis, improving anasarca and BLE edema  Neuro:  A&Ox3, CN grossly intact, no focal deficits.  Skin: No rashes or lesions noted      Result Review    Result Review:  I have personally reviewed the results from the time of this admission to 2024 09:01 EDT and agree with these findings:  [x]  Laboratory  [x]  Microbiology  [x]  Radiology  []  EKG/Telemetry   []  Cardiology/Vascular   []  Pathology  []  Old records  []  Other:  Most notable findings include:         Lab 24  0345 24  0359 24  0353 04/15/24  0306 24  1923   WBC 10.33 12.62* 6.52 8.00 7.89   HEMOGLOBIN 8.2* 7.8* 7.9* 7.5* 7.4*   HEMATOCRIT 27.0* 26.6* 26.5* 25.7* 26.0*   PLATELETS 303 319 022 527 770    SODIUM 137 135* 137 136 138   POTASSIUM 4.3 4.6 4.8 4.4 4.7   CHLORIDE 90* 90* 94* 92* 93*   CO2 38.4* 36.6* 36.3* 36.7* 38.4*   BUN 47* 38* 29* 23* 23*   CREATININE 2.27* 2.19* 1.72* 1.90* 1.92*   GLUCOSE 215* 154* 224* 158* 234*   CALCIUM 8.7 8.5* 8.2* 8.5* 8.6   PHOSPHORUS 3.0 2.7 4.0  --   --    TOTAL PROTEIN  --   --   --   --  7.2   ALBUMIN  --   --   --   --  3.4*   GLOBULIN  --   --   --   --  3.8     CT Chest Without Contrast Diagnostic    Result Date: 4/15/2024  CT CHEST WO CONTRAST DIAGNOSTIC-  Date of Exam: 4/15/2024 4:57 PM  Indication: soa, possible pna, plueral effusion eval,  pmhx copd/chf/morb obesity/laura; J96.02-Acute respiratory failure with hypercapnia.  Comparison: Chest x-ray 4/14/2024, CT chest 12/9/2023  Technique: Axial CT images were obtained of the chest without contrast administration.  Reconstructed coronal and sagittal images were also obtained. Automated exposure control and iterative construction methods were used.   Findings:  Hilum and Mediastinum: Moderate coronary artery atherosclerotic disease.  No pericardial effusion.  Unremarkable thoracic aorta and pulmonary arteries. There is a right-sided port. The tip terminates at the superior vena cava. There is a residual port in the left soft tissues and internal jugular terminating at the cavoatrial junction. Cardiomegaly. Precarinal lymph node is enlarged. On image 121 it measures 1.3 cm in short axis. This is similar to previous exam.  Lung Parenchyma and Pleura: There are moderate bilateral pleural effusions. These are new. There is bronchiectasis which is varicose. There is bibasilar airspace opacity right greater than left pneumonia favored over atelectasis. There is scattered geographic groundglass opacity. There is new consolidation in the lateral right upper lobe. In the lingula there is a noncalcified nodule on image #217. It measures 9 mm. There is surrounding groundglass opacity. This is new.  Upper Abdomen: Unremarkable.   Soft tissues: Unremarkable.  Osseous structures: No aggressive focal lytic or sclerotic osseous lesions. Osteopenia.      Impression: 1.  Moderate bilateral pleural effusions. Bibasilar opacity right greater than left. Pneumonia most likely. 2.  Noncalcified nodule in the lingula measuring 9 mm. Scattered areas of groundglass with mild septal thickening likely due to superimposed pulmonary edema. 3.  Severe varicose and cystic bronchiectasis. 4.  New opacity in the lateral right upper lobe atelectasis versus pneumonia. 5.  A follow-up CT chest in 3 months time is recommended to evaluate for the resolution of the noncalcified nodules and parenchymal opacities.  Electronically Signed By-Francine Garner MD On:4/15/2024 5:19 PM       Pleural fluid transudate and consistent with volume overload      Assessment & Plan   Assessment / Plan     Active Hospital Problems:  Active Hospital Problems    Diagnosis    • **Acute on chronic respiratory failure with hypercapnia    • Chronic respiratory failure with hypoxia and hypercapnia    • Wheezing    • Ulcer of left foot, limited to breakdown of skin    • Type 2 DM with CKD stage 3 and hypertension    • Stage 3b chronic kidney disease    • Tobacco abuse, in remission    • Paroxysmal atrial fibrillation    • Obstructive sleep apnea    • COPD exacerbation    • Seizures      Impression   acute on chronic hypercapnic respiratory failure requiring NIPPV  Severe COPD with exacerbation, FEV1 26%  Obesity hypoventilation syndrome  NSTEMI type II from demand ischemia  Acute on chronic decompensated congestive diastolic heart failure  Acute cardiogenic pulmonary edema  Bilateral pleural effusions  9 mm lingular lung nodule  Bronchiectasis with acute exacerbation  Anasarca  Anemia  CKD  Hypocalcemia  Hyponatremia, clinically insignificant  Medical noncompliance of NIV  History of MILADY  Tobacco abuse in remission     Plan:  -Wean O2 to keep SPO2 greater than 90%  -Pleural fluid transudate and  consistent with volume overload  -Continue Brovana, Pulmicort, DuoNebs  -Transition to prednisone 40 mg daily to complete 7 days  -Complete 7 days of doxycycline 100 mg twice daily  -Continue bronchopulmonary and bronchodilator protocols.  -Continue Jardiance  -Continue Bumex 2 mg IV 3 times daily.  Redose metolazone 5 mg x 1.  Monitor closely given uptrending creatinine.  -Continue to monitor renal panel and electrolytes.  Replace electrolytes as necessary.  -Continue Eliquis for A-fib  -PT/OT on board.  Appreciate assistance  -RT  on board.  Appreciate assistance.  Evaluating for home chest vest.  -Follow-up with pulmonology scheduled 5/2/2024    DVT prophylaxis:  Medical DVT prophylaxis orders are present.    CODE STATUS:   Code Status (Patient has no pulse and is not breathing): CPR (Attempt to Resuscitate)  Medical Interventions (Patient has pulse or is breathing): Full Support    Labs, managing, and medications personally reviewed  Discussed with primary and patient    I, Dr. Severiano Carolina, have spent more than 50% of the total time managing the patient in this encounter today.  This included personally reviewing all pertinent labs, imaging, microbiology and documentation. Also discussing the case with the patient and any available family, the admitting physician and any available ancillary staff.    Electronically signed by CON Camilo, 04/18/24, 11:51 AM EDT.  Electronically signed by Severiano Carolina MD, 04/18/24, 2:09 PM EDT.

## 2024-04-18 NOTE — SIGNIFICANT NOTE
04/18/24 1513   OTHER   Discipline occupational therapist   Rehab Time/Intention   Session Not Performed patient unavailable for treatment  (with RT)

## 2024-04-19 ENCOUNTER — TELEPHONE (OUTPATIENT)
Dept: FAMILY MEDICINE CLINIC | Age: 59
End: 2024-04-19
Payer: COMMERCIAL

## 2024-04-19 ENCOUNTER — READMISSION MANAGEMENT (OUTPATIENT)
Dept: CALL CENTER | Facility: HOSPITAL | Age: 59
End: 2024-04-19
Payer: COMMERCIAL

## 2024-04-19 VITALS
HEIGHT: 69 IN | RESPIRATION RATE: 18 BRPM | DIASTOLIC BLOOD PRESSURE: 53 MMHG | HEART RATE: 73 BPM | TEMPERATURE: 98.6 F | BODY MASS INDEX: 37.78 KG/M2 | SYSTOLIC BLOOD PRESSURE: 115 MMHG | WEIGHT: 255.07 LBS | OXYGEN SATURATION: 92 %

## 2024-04-19 PROBLEM — J47.1 BRONCHIECTASIS WITH ACUTE EXACERBATION: Status: ACTIVE | Noted: 2023-12-07

## 2024-04-19 LAB
ANION GAP SERPL CALCULATED.3IONS-SCNC: 9.3 MMOL/L (ref 5–15)
BACTERIA FLD CULT: NORMAL
BACTERIA SPEC AEROBE CULT: NORMAL
BACTERIA SPEC AEROBE CULT: NORMAL
BASOPHILS # BLD AUTO: 0.02 10*3/MM3 (ref 0–0.2)
BASOPHILS NFR BLD AUTO: 0.2 % (ref 0–1.5)
BUN SERPL-MCNC: 54 MG/DL (ref 6–20)
BUN/CREAT SERPL: 23.9 (ref 7–25)
CALCIUM SPEC-SCNC: 8.4 MG/DL (ref 8.6–10.5)
CHLORIDE SERPL-SCNC: 87 MMOL/L (ref 98–107)
CO2 SERPL-SCNC: 37.7 MMOL/L (ref 22–29)
CREAT SERPL-MCNC: 2.26 MG/DL (ref 0.76–1.27)
DEPRECATED RDW RBC AUTO: 49.9 FL (ref 37–54)
EGFRCR SERPLBLD CKD-EPI 2021: 32.8 ML/MIN/1.73
EOSINOPHIL # BLD AUTO: 0 10*3/MM3 (ref 0–0.4)
EOSINOPHIL NFR BLD AUTO: 0 % (ref 0.3–6.2)
ERYTHROCYTE [DISTWIDTH] IN BLOOD BY AUTOMATED COUNT: 15.6 % (ref 12.3–15.4)
GLUCOSE BLDC GLUCOMTR-MCNC: 252 MG/DL (ref 70–99)
GLUCOSE BLDC GLUCOMTR-MCNC: 365 MG/DL (ref 70–99)
GLUCOSE SERPL-MCNC: 283 MG/DL (ref 65–99)
GRAM STN SPEC: NORMAL
GRAM STN SPEC: NORMAL
HCT VFR BLD AUTO: 27.3 % (ref 37.5–51)
HGB BLD-MCNC: 8.3 G/DL (ref 13–17.7)
IMM GRANULOCYTES # BLD AUTO: 0.23 10*3/MM3 (ref 0–0.05)
IMM GRANULOCYTES NFR BLD AUTO: 2 % (ref 0–0.5)
LYMPHOCYTES # BLD AUTO: 1.39 10*3/MM3 (ref 0.7–3.1)
LYMPHOCYTES NFR BLD AUTO: 12 % (ref 19.6–45.3)
MAGNESIUM SERPL-MCNC: 2.2 MG/DL (ref 1.6–2.6)
MCH RBC QN AUTO: 26.7 PG (ref 26.6–33)
MCHC RBC AUTO-ENTMCNC: 30.4 G/DL (ref 31.5–35.7)
MCV RBC AUTO: 87.8 FL (ref 79–97)
MONOCYTES # BLD AUTO: 1.31 10*3/MM3 (ref 0.1–0.9)
MONOCYTES NFR BLD AUTO: 11.3 % (ref 5–12)
NEUTROPHILS NFR BLD AUTO: 74.5 % (ref 42.7–76)
NEUTROPHILS NFR BLD AUTO: 8.66 10*3/MM3 (ref 1.7–7)
NRBC BLD AUTO-RTO: 0 /100 WBC (ref 0–0.2)
PHOSPHATE SERPL-MCNC: 2.5 MG/DL (ref 2.5–4.5)
PLATELET # BLD AUTO: 302 10*3/MM3 (ref 140–450)
PMV BLD AUTO: 9.3 FL (ref 6–12)
POTASSIUM SERPL-SCNC: 4 MMOL/L (ref 3.5–5.2)
RBC # BLD AUTO: 3.11 10*6/MM3 (ref 4.14–5.8)
SODIUM SERPL-SCNC: 134 MMOL/L (ref 136–145)
WBC NRBC COR # BLD AUTO: 11.61 10*3/MM3 (ref 3.4–10.8)

## 2024-04-19 PROCEDURE — 82948 REAGENT STRIP/BLOOD GLUCOSE: CPT

## 2024-04-19 PROCEDURE — 84100 ASSAY OF PHOSPHORUS: CPT | Performed by: INTERNAL MEDICINE

## 2024-04-19 PROCEDURE — 83735 ASSAY OF MAGNESIUM: CPT | Performed by: INTERNAL MEDICINE

## 2024-04-19 PROCEDURE — 82948 REAGENT STRIP/BLOOD GLUCOSE: CPT | Performed by: FAMILY MEDICINE

## 2024-04-19 PROCEDURE — 63710000001 INSULIN DETEMIR PER 5 UNITS: Performed by: INTERNAL MEDICINE

## 2024-04-19 PROCEDURE — 63710000001 PREDNISONE PER 1 MG: Performed by: NURSE PRACTITIONER

## 2024-04-19 PROCEDURE — 80048 BASIC METABOLIC PNL TOTAL CA: CPT | Performed by: INTERNAL MEDICINE

## 2024-04-19 PROCEDURE — 25010000002 CEFEPIME PER 500 MG: Performed by: INTERNAL MEDICINE

## 2024-04-19 PROCEDURE — 63710000001 INSULIN LISPRO (HUMAN) PER 5 UNITS: Performed by: FAMILY MEDICINE

## 2024-04-19 PROCEDURE — 94799 UNLISTED PULMONARY SVC/PX: CPT

## 2024-04-19 PROCEDURE — 94761 N-INVAS EAR/PLS OXIMETRY MLT: CPT

## 2024-04-19 PROCEDURE — 99239 HOSP IP/OBS DSCHRG MGMT >30: CPT | Performed by: INTERNAL MEDICINE

## 2024-04-19 PROCEDURE — 85025 COMPLETE CBC W/AUTO DIFF WBC: CPT | Performed by: INTERNAL MEDICINE

## 2024-04-19 PROCEDURE — 99233 SBSQ HOSP IP/OBS HIGH 50: CPT | Performed by: INTERNAL MEDICINE

## 2024-04-19 PROCEDURE — 94664 DEMO&/EVAL PT USE INHALER: CPT

## 2024-04-19 PROCEDURE — 25010000002 FUROSEMIDE PER 20 MG: Performed by: INTERNAL MEDICINE

## 2024-04-19 RX ORDER — LEVOFLOXACIN 750 MG/1
750 TABLET, FILM COATED ORAL EVERY OTHER DAY
Qty: 5 TABLET | Refills: 0 | Status: ON HOLD | OUTPATIENT
Start: 2024-04-19 | End: 2024-04-29

## 2024-04-19 RX ORDER — DOXYCYCLINE 100 MG/1
100 CAPSULE ORAL EVERY 12 HOURS SCHEDULED
Qty: 6 CAPSULE | Refills: 0 | Status: SHIPPED | OUTPATIENT
Start: 2024-04-19 | End: 2024-04-22

## 2024-04-19 RX ORDER — FUROSEMIDE 10 MG/ML
40 INJECTION INTRAMUSCULAR; INTRAVENOUS
Status: DISCONTINUED | OUTPATIENT
Start: 2024-04-19 | End: 2024-04-19 | Stop reason: HOSPADM

## 2024-04-19 RX ORDER — PREDNISONE 20 MG/1
40 TABLET ORAL
Qty: 4 TABLET | Refills: 0 | Status: SHIPPED | OUTPATIENT
Start: 2024-04-20 | End: 2024-04-22

## 2024-04-19 RX ADMIN — ASPIRIN 81 MG: 81 TABLET, COATED ORAL at 08:41

## 2024-04-19 RX ADMIN — FUROSEMIDE 40 MG: 10 INJECTION, SOLUTION INTRAMUSCULAR; INTRAVENOUS at 09:36

## 2024-04-19 RX ADMIN — CEFEPIME 2000 MG: 2 INJECTION, POWDER, FOR SOLUTION INTRAVENOUS at 08:40

## 2024-04-19 RX ADMIN — LEVETIRACETAM 500 MG: 500 TABLET, FILM COATED ORAL at 08:41

## 2024-04-19 RX ADMIN — ACETAMINOPHEN 650 MG: 325 TABLET ORAL at 03:45

## 2024-04-19 RX ADMIN — PREDNISONE 40 MG: 20 TABLET ORAL at 08:41

## 2024-04-19 RX ADMIN — APIXABAN 5 MG: 5 TABLET, FILM COATED ORAL at 03:39

## 2024-04-19 RX ADMIN — GUAIFENESIN 1200 MG: 600 TABLET ORAL at 08:41

## 2024-04-19 RX ADMIN — IPRATROPIUM BROMIDE AND ALBUTEROL SULFATE 3 ML: .5; 3 SOLUTION RESPIRATORY (INHALATION) at 00:51

## 2024-04-19 RX ADMIN — NIFEDIPINE 30 MG: 30 TABLET, FILM COATED, EXTENDED RELEASE ORAL at 08:41

## 2024-04-19 RX ADMIN — IPRATROPIUM BROMIDE AND ALBUTEROL SULFATE 3 ML: .5; 3 SOLUTION RESPIRATORY (INHALATION) at 07:42

## 2024-04-19 RX ADMIN — ARFORMOTEROL TARTRATE 15 MCG: 15 SOLUTION RESPIRATORY (INHALATION) at 07:42

## 2024-04-19 RX ADMIN — INSULIN LISPRO 8 UNITS: 100 INJECTION, SOLUTION INTRAVENOUS; SUBCUTANEOUS at 08:40

## 2024-04-19 RX ADMIN — Medication 1 MG: at 08:41

## 2024-04-19 RX ADMIN — FAMOTIDINE 40 MG: 20 TABLET ORAL at 06:21

## 2024-04-19 RX ADMIN — EMPAGLIFLOZIN 10 MG: 10 TABLET, FILM COATED ORAL at 08:41

## 2024-04-19 RX ADMIN — INSULIN DETEMIR 10 UNITS: 100 INJECTION, SOLUTION SUBCUTANEOUS at 08:40

## 2024-04-19 RX ADMIN — IPRATROPIUM BROMIDE AND ALBUTEROL SULFATE 3 ML: .5; 3 SOLUTION RESPIRATORY (INHALATION) at 11:36

## 2024-04-19 RX ADMIN — FINASTERIDE 5 MG: 5 TABLET, FILM COATED ORAL at 08:41

## 2024-04-19 RX ADMIN — CARVEDILOL 25 MG: 25 TABLET, FILM COATED ORAL at 08:41

## 2024-04-19 RX ADMIN — Medication 400 MG: at 08:41

## 2024-04-19 RX ADMIN — DULOXETINE HYDROCHLORIDE 30 MG: 30 CAPSULE, DELAYED RELEASE ORAL at 08:40

## 2024-04-19 RX ADMIN — BUDESONIDE 0.5 MG: 0.5 INHALANT RESPIRATORY (INHALATION) at 07:42

## 2024-04-19 RX ADMIN — DOXYCYCLINE 100 MG: 100 CAPSULE ORAL at 08:40

## 2024-04-19 NOTE — PROGRESS NOTES
Pulmonary / Critical Care Progress Note      Patient Name: Preston Wallis  : 1965  MRN: [id number]  Primary Care Physician:  Kimmy Riley MD  Date of admission: 2024    Subjective   Subjective   Follow-up for Acute on chronic hypercapnic respiratory failure requiring NIPPV, CHF exacerbation    Diuresing well  Tolerating NIPPV  Sputum culture came back with Pseudomonas  On 3 L of oxygen  Pleural fluid consistent with volume overload  Dyspnea and cough better  Still with some orthopnea  No nausea, fevers or chills    Objective   Objective     Vitals:   Temp:  [97.9 °F (36.6 °C)-98.8 °F (37.1 °C)] 98.6 °F (37 °C)  Heart Rate:  [62-88] 73  Resp:  [16-20] 18  BP: (115-155)/(53-65) 115/53  Flow (L/min):  [2] 2      Physical Exam   Vital Signs Reviewed   General: WDWN, Alert, NAD.  Chronically ill-appearing male, sitting on side of bed  HEENT:  PERRL, EOMI.  OP, nares clear, no sinus tenderness  Neck:  Supple, no JVD, no thyromegaly  Chest: Improved aeration with bibasilar crackles, no work of breathing noted  CV: RRR, no MGR, pulses 2+, equal.  Abd:  Soft, NT, ND, + BS, no HSM, obese  EXT:  no clubbing, no cyanosis, improving anasarca and BLE edema  Neuro:  A&Ox3, CN grossly intact, no focal deficits.  Skin: No rashes or lesions noted      Result Review    Result Review:  I have personally reviewed the results from the time of this admission to 2024 15:54 EDT and agree with these findings:  [x]  Laboratory  [x]  Microbiology  [x]  Radiology  []  EKG/Telemetry   []  Cardiology/Vascular   []  Pathology  []  Old records  []  Other:  Most notable findings include:         Lab 24  0356 24  0345 24  0359 24  0353 04/15/24  0306 24  1923   WBC 11.61* 10.33 12.62* 6.52 8.00 7.89   HEMOGLOBIN 8.3* 8.2* 7.8* 7.9* 7.5* 7.4*   HEMATOCRIT 27.3* 27.0* 26.6* 26.5* 25.7* 26.0*   PLATELETS 302 303 319 334 327 312   SODIUM 134* 137 135* 137 136 138   POTASSIUM 4.0 4.3 4.6 4.8 4.4 4.7    CHLORIDE 87* 90* 90* 94* 92* 93*   CO2 37.7* 38.4* 36.6* 36.3* 36.7* 38.4*   BUN 54* 47* 38* 29* 23* 23*   CREATININE 2.26* 2.27* 2.19* 1.72* 1.90* 1.92*   GLUCOSE 283* 215* 154* 224* 158* 234*   CALCIUM 8.4* 8.7 8.5* 8.2* 8.5* 8.6   PHOSPHORUS 2.5 3.0 2.7 4.0  --   --    TOTAL PROTEIN  --   --   --   --   --  7.2   ALBUMIN  --   --   --   --   --  3.4*   GLOBULIN  --   --   --   --   --  3.8     CT Chest Without Contrast Diagnostic    Result Date: 4/15/2024  CT CHEST WO CONTRAST DIAGNOSTIC-  Date of Exam: 4/15/2024 4:57 PM  Indication: soa, possible pna, plueral effusion eval,  pmhx copd/chf/morb obesity/laura; J96.02-Acute respiratory failure with hypercapnia.  Comparison: Chest x-ray 4/14/2024, CT chest 12/9/2023  Technique: Axial CT images were obtained of the chest without contrast administration.  Reconstructed coronal and sagittal images were also obtained. Automated exposure control and iterative construction methods were used.   Findings:  Hilum and Mediastinum: Moderate coronary artery atherosclerotic disease.  No pericardial effusion.  Unremarkable thoracic aorta and pulmonary arteries. There is a right-sided port. The tip terminates at the superior vena cava. There is a residual port in the left soft tissues and internal jugular terminating at the cavoatrial junction. Cardiomegaly. Precarinal lymph node is enlarged. On image 121 it measures 1.3 cm in short axis. This is similar to previous exam.  Lung Parenchyma and Pleura: There are moderate bilateral pleural effusions. These are new. There is bronchiectasis which is varicose. There is bibasilar airspace opacity right greater than left pneumonia favored over atelectasis. There is scattered geographic groundglass opacity. There is new consolidation in the lateral right upper lobe. In the lingula there is a noncalcified nodule on image #217. It measures 9 mm. There is surrounding groundglass opacity. This is new.  Upper Abdomen: Unremarkable.  Soft  tissues: Unremarkable.  Osseous structures: No aggressive focal lytic or sclerotic osseous lesions. Osteopenia.      Impression: 1.  Moderate bilateral pleural effusions. Bibasilar opacity right greater than left. Pneumonia most likely. 2.  Noncalcified nodule in the lingula measuring 9 mm. Scattered areas of groundglass with mild septal thickening likely due to superimposed pulmonary edema. 3.  Severe varicose and cystic bronchiectasis. 4.  New opacity in the lateral right upper lobe atelectasis versus pneumonia. 5.  A follow-up CT chest in 3 months time is recommended to evaluate for the resolution of the noncalcified nodules and parenchymal opacities.  Electronically Signed By-Francine Garner MD On:4/15/2024 5:19 PM       Pleural fluid transudate and consistent with volume overload  Sputum culture with Pseudomonas    Assessment & Plan   Assessment / Plan     Active Hospital Problems:  Active Hospital Problems    Diagnosis    • **Acute on chronic respiratory failure with hypercapnia    • Chronic respiratory failure with hypoxia and hypercapnia    • Wheezing    • Ulcer of left foot, limited to breakdown of skin    • Bronchiectasis with acute exacerbation    • Type 2 DM with CKD stage 3 and hypertension    • Stage 3b chronic kidney disease    • Pneumonia of both lungs due to Pseudomonas species    • Tobacco abuse, in remission    • Paroxysmal atrial fibrillation    • Obstructive sleep apnea    • COPD exacerbation    • Seizures      Impression   acute on chronic hypercapnic respiratory failure requiring NIPPV  Severe COPD with exacerbation, FEV1 26%  Obesity hypoventilation syndrome  NSTEMI type II from demand ischemia  Acute on chronic decompensated congestive diastolic heart failure  Acute cardiogenic pulmonary edema  Bilateral pleural effusions  9 mm lingular lung nodule  Bronchiectasis with acute exacerbation  Pseudomonas pneumonia  Anasarca  Anemia  CKD  Hypocalcemia  Hyponatremia, clinically insignificant  Medical  noncompliance of NIV  History of MILADY  Tobacco abuse in remission     Plan:  -Wean O2 to keep SPO2 greater than 90%  -Pleural fluid transudate and consistent with volume overload  -Continue Brovana, Pulmicort, DuoNebs  -Finish 7 days of steroids  -Complete 7 days of doxycycline 100 mg twice daily 6.  Was on cefepime as of yesterday.  Now with Pseudomonas growing in respiratory culture.  Discharged on Levaquin to complete 7 days of therapy start Levaquin to complete 7 days of therapy  -Continue bronchopulmonary and bronchodilator protocols.  -Continue Jardiance  -Renal function improving.  Start Lasix 40 mg IV twice daily  -Continue to monitor renal panel and electrolytes.  Replace electrolytes as necessary.  -Continue Eliquis for A-fib  -PT/OT on board.  Appreciate assistance  -RT  on board.  Appreciate assistance.  Trying to arrange for home chest vest  -Follow-up with pulmonology scheduled 5/2/2024    Mr. Wallis has a history of bronchiectasis visualized on CT 4/15/24 and daily productive cough for more than six months. He has been using the [employer] OPEP but it has failed to mobilize secretions. He will need to have HFCWO at home to loosen and help loosen and mobilize secretions.     DVT prophylaxis:  No DVT prophylaxis order currently exists.    CODE STATUS:   Code Status (Patient has no pulse and is not breathing): CPR (Attempt to Resuscitate)  Medical Interventions (Patient has pulse or is breathing): Full Support    Labs, managing, and medications personally reviewed  Discussed with primary and patient    Electronically signed by Severiano Carolina MD, 04/19/24, 3:55 PM EDT.

## 2024-04-19 NOTE — TELEPHONE ENCOUNTER
Received call from Emily [facility]ton trying to scheduled hospital follow up with Dr. Riley but I did not have anything until 05/06/2024. They were wanting him to follow in a week from his d/c date 04/19/24. Please advise.

## 2024-04-19 NOTE — PLAN OF CARE
Goal Outcome Evaluation:           Progress: no change  Outcome Evaluation: Pt had a capillary blood gluose of 416 overnight. Discussed with hospitalist PA, pt given short and long-acting insulin. Blood glucose with morning labs 288. He had great urine output from lopez last night. Reinforced education on skin care and importance of frequent turning/repositioning in bed. VSS. Bisi Verduzco RN

## 2024-04-19 NOTE — DISCHARGE SUMMARY
[facility]         HOSPITALIST  DISCHARGE SUMMARY    Patient Name: Preston Wallis  : 1965  MRN: [id number]    Date of Admission: 2024  Date of Discharge:  2024    Primary Care Physician: Kimmy Riley MD    Consults       Date and Time Order Name Status Description    4/15/2024  4:23 PM Inpatient Pulmonology Consult Completed     4/15/2024  3:49 AM Inpatient Podiatry Consult Completed     2024  9:55 PM Inpatient Hospitalist Consult              Active and Resolved Hospital Problems:  Active Hospital Problems    Diagnosis POA   • **Acute on chronic respiratory failure with hypercapnia [J96.22] Yes   • Chronic respiratory failure with hypoxia and hypercapnia [J96.11, J96.12] Yes   • Wheezing [R06.2] Yes   • Ulcer of left foot, limited to breakdown of skin [L97.521] Yes   • Type 2 DM with CKD stage 3 and hypertension [E11.22, I12.9, N18.30] Yes   • Stage 3b chronic kidney disease [N18.32] Yes   • Tobacco abuse, in remission [F17.201] Yes   • Paroxysmal atrial fibrillation [I48.0] Yes   • Obstructive sleep apnea [G47.33] Yes   • COPD exacerbation [J44.1] Yes   • Seizures [R56.9] Yes      Resolved Hospital Problems   No resolved problems to display.       Hospital Course     Hospital Course:  Preston Wallis is a 58 y.o. male  with past medical history of severe COPD with an FEV1 of 26%, CHF, anemia history of recurrent Pseudomonas infections with MDRO Pseudomonas, history of PE in 2019 on Xarelto, tobacco abuse of cigarettes in remission, MILADY with home NIV, morbid obesity, who presented to the ED for evaluation of shortness of breath.    In the ED, ABG was 7.2 , troponins were 118, proBNP 807.   CXR demonstrated chronic changes in both lungs similar to prior exams with increased interstitial opacities bilaterally possibly reflecting pulmonary edema.  Small amount of right pleural fluid may also be present.  Patient stateed that for the last week or so he has  been having worsening shortness of breath with worsening lower extremity edema orthopnea, generalized weakness and fatigue.  Patient was seen at this facility a few weeks ago with similar complaints and was treated for a CHF exacerbation.  Due to his worsening symptoms patient came to the ED for further evaluation.  In the ED patient was hypoxic on arrival requiring oxygen supplementation via nasal cannula with remaining vitals being within normal limits.  Labs showed he had significant anemia with elevated proBNP level, reduced renal function, ABG showing hypercapnic respiratory failure.  Troponin was elevated but delta was negative.  Chest x-ray showed chronic findings along with increased pulmonary edema.   Patient admitted for evaluation and treatment.  Patient was admitted to the hospital and received aggressive IV diuresis.  CT of the chest will need to be repeated in 3 months to ensure resolution of his effusions and pneumonia.  Patient was seen by pulmonary he was continued on bronchodilators and his home BiPAP machine patient did receive a course of antibiotics and has been transitioned over to oral antibiotics to complete a total of 7 days as well as a total of 7 days of oral steroids.  Patient can resume his home Bumex dose.  Patient was continued on his Eliquis for his atrial fibrillation.  Patient was continued on Keppra for his seizure disorder.  Patient did really see wound care for his chronic lower extremity foot wounds.  Patient overall is significantly better and will discharge home today with home health services.  Patient will be discharged home in stable condition.     DISCHARGE Follow Up Recommendations for labs and diagnostics:   Patient needs close follow-up with his primary care provider in 1 week.  Patient should also follow-up with his specialists as scheduled  REPEAT CT OF CHEST IN 3 MONTHS       PLEASE NOTE AFTER PATIENT LEFT HIS SPUTUM CULTURE IS GROWING PSEUDOMONAS. I HAVE CALLED HIS  WIFE AND UPDATED HER AND SENT AN RX FOR LEVAQUIN TO HIS PHARMACY TO COVER THIS INFECTION. DR. SANTORO ALSO UPDATED. PATIENT HAS FOLLOW UP IN THE PULMONARY CLINIC.     Day of Discharge     Vital Signs:  Temp:  [97.5 °F (36.4 °C)-98.8 °F (37.1 °C)] 98.6 °F (37 °C)  Heart Rate:  [62-88] 70  Resp:  [16-20] 16  BP: (115-155)/(53-65) 115/53  Flow (L/min):  [2-3] 2  Physical Exam:   General: alert no distress, sitting on side of bed    Lungs: Clear, MILD wheezing   CV: RRR lower extremity pitting edema improved   Obese abdomen nontender  ANO x 3 much more appropriate overall  Lower extremity diabetic foot ulcer dressed        LEVAQUIN ALSO SENT FOR PSEUDOMONAS     Discharge Details        Discharge Medications        New Medications        Instructions Start Date   doxycycline 100 MG capsule  Commonly known as: MONODOX  Replaces: doxycycline 100 MG tablet   100 mg, Oral, Every 12 Hours Scheduled      predniSONE 20 MG tablet  Commonly known as: DELTASONE   40 mg, Oral, Daily With Breakfast   Start Date: April 20, 2024            Changes to Medications        Instructions Start Date   finasteride 5 MG tablet  Commonly known as: PROSCAR  What changed: when to take this   5 mg, Oral, Daily      folic acid 1 MG tablet  Commonly known as: FOLVITE  What changed: when to take this   1 mg, Oral, Daily             Continue These Medications        Instructions Start Date   apixaban 5 MG tablet tablet  Commonly known as: Eliquis   5 mg, Oral, Every 12 Hours      aspirin 81 MG EC tablet   81 mg, Oral, Daily      atorvastatin 20 MG tablet  Commonly known as: LIPITOR   20 mg, Oral, Nightly      Dina Aerosphere 160-9-4.8 MCG/ACT aerosol inhaler  Generic drug: Budeson-Glycopyrrol-Formoterol   2 puffs, Inhalation, 2 Times Daily, Rinse mouth out after each use      bumetanide 2 MG tablet  Commonly known as: BUMEX   Take 1 tablet by mouth 2 (Two) Times a Day.      busPIRone 15 MG tablet  Commonly known as: BUSPAR   15 mg, Oral, 2 Times  Daily PRN      CALCIUM CITRATE PO   1 tablet, Oral, Nightly      carvedilol 25 MG tablet  Commonly known as: COREG   25 mg, Oral, Every 12 Hours Scheduled      docusate sodium 100 MG capsule  Commonly known as: COLACE   100 mg, Oral, 2 Times Daily PRN      DULoxetine 30 MG capsule  Commonly known as: CYMBALTA   30 mg, Oral, Daily      famotidine 40 MG tablet  Commonly known as: PEPCID   40 mg, Oral, Daily      Farxiga 5 MG tablet tablet  Generic drug: dapagliflozin   5 mg, Oral, Daily      ferrous gluconate 324 MG tablet  Commonly known as: FERGON   324 mg, Oral, Daily With Breakfast      Insulin Lispro 100 UNIT/ML injection  Commonly known as: humaLOG   8 Units, Subcutaneous, 3 Times Daily Before Meals, Per sliding scale      ipratropium-albuterol 0.5-2.5 mg/3 ml nebulizer  Commonly known as: DUO-NEB   3 mL, Nebulization, Every 4 Hours PRN      Levemir 100 UNIT/ML injection  Generic drug: insulin detemir   10 Units, Subcutaneous, Nightly      levETIRAcetam 500 MG tablet  Commonly known as: KEPPRA   500 mg, Oral, 2 Times Daily      magnesium oxide 400 tablet tablet  Commonly known as: MAG-OX   400 mg, Oral, Nightly      montelukast 10 MG tablet  Commonly known as: SINGULAIR   10 mg, Oral, Nightly      multivitamin with iron tablet tablet   1 tablet, Oral, Daily      NIFEdipine XL 30 MG 24 hr tablet  Commonly known as: PROCARDIA XL   30 mg, Oral, Every Morning      O2  Commonly known as: OXYGEN   2 Liter O2 - CONTINUOUS (route: Oxygen)      traZODone 50 MG tablet  Commonly known as: DESYREL   50 mg, Oral, Nightly      Ventolin  (90 Base) MCG/ACT inhaler  Generic drug: albuterol sulfate HFA   2 puffs, Inhalation, Every 4 Hours PRN      vitamin C 500 MG tablet  Commonly known as: ASCORBIC ACID   500 mg, Oral, 2 Times Daily      Vitamin D3 50 MCG (2000 UT) tablet  Generic drug: Cholecalciferol   50 mcg, Oral, Daily             Stop These Medications      doxycycline 100 MG tablet  Commonly known as:  ADOXA  Replaced by: doxycycline 100 MG capsule              Allergies   Allergen Reactions   • Benadryl [Diphenhydramine] Itching   • Proventil [Albuterol] Other (See Comments)     Mouth sores         Discharge Disposition:  Home or Self Care    Diet:  Hospital:  Diet Order   Procedures   • Diet: Cardiac, Fluid Restriction (240 mL/tray); Low Sodium (2g); 1500 mL/day; Fluid Consistency: Thin (IDDSI 0)       Discharge Activity: AS TOLERATED       CODE STATUS:  Code Status and Medical Interventions:   Ordered at: 04/15/24 0954     Code Status (Patient has no pulse and is not breathing):    CPR (Attempt to Resuscitate)     Medical Interventions (Patient has pulse or is breathing):    Full Support         Future Appointments   Date Time Provider Department Center   4/29/2024  9:45 AM Jordon Fuentes DPM [facility] POD ET[facility]   4/29/2024 11:30 AM Lino Ware MD [facility] PASTORA [facility]   5/2/2024  3:30 PM Betzaida Nelson APRCHARITY [facility] PCC BAR [facility]   5/20/2024 10:30 AM Teresita White MD [facility] U [facility]   6/28/2024 11:40 AM Neli Paredes APRN [facility] DIAB BT [facility]   7/11/2024 11:00 AM Kimmy Riley MD [facility] PC BARDS [facility]   7/31/2024 10:45 AM Lino Ware MD [facility] CD [facility]DS [facility]       Additional Instructions for the Follow-ups that You Need to Schedule       Discharge Follow-up with PCP   As directed       Currently Documented PCP:    Kimmy Riley MD    PCP Phone Number:    [phone]     Follow Up Details: in 1 week                Pertinent  and/or Most Recent Results     PROCEDURES:   THORACENTESIS     LAB RESULTS:      Lab 04/19/24  0356 04/18/24  0345 04/17/24  0359 04/16/24  0353 04/15/24  0306 04/14/24  1923   WBC 11.61* 10.33 12.62* 6.52 8.00 7.89   HEMOGLOBIN 8.3* 8.2* 7.8* 7.9* 7.5* 7.4*   HEMATOCRIT 27.3* 27.0* 26.6* 26.5* 25.7* 26.0*   PLATELETS 302 303 319 334 327 312   NEUTROS ABS 8.66* 7.45* 9.92* 5.16  --  4.75   IMMATURE GRANS (ABS) 0.23* 0.16* 0.10* 0.08*  --  0.07*   LYMPHS ABS  1.39 1.41 1.31 0.88  --  1.62   MONOS ABS 1.31* 1.29* 1.28* 0.39  --  0.90   EOS ABS 0.00 0.00 0.00 0.00  --  0.51*   MCV 87.8 89.1 91.4 92.0 93.5 94.9   PROCALCITONIN  --   --   --   --  0.11  --    LACTATE  --   --   --   --   --  0.8         Lab 04/19/24  0356 04/18/24  0345 04/17/24  0359 04/16/24  0353 04/15/24  0306 04/14/24  1923 04/13/24  0022   SODIUM 134* 137 135* 137 136   < >  --    POTASSIUM 4.0 4.3 4.6 4.8 4.4   < >  --    CHLORIDE 87* 90* 90* 94* 92*   < >  --    CO2 37.7* 38.4* 36.6* 36.3* 36.7*   < >  --    ANION GAP 9.3 8.6 8.4 6.7 7.3   < >  --    BUN 54* 47* 38* 29* 23*   < >  --    CREATININE 2.26* 2.27* 2.19* 1.72* 1.90*   < >  --    EGFR 32.8* 32.6* 34.1* 45.5* 40.4*   < >  --    GLUCOSE 283* 215* 154* 224* 158*   < >  --    CALCIUM 8.4* 8.7 8.5* 8.2* 8.5*   < >  --    MAGNESIUM 2.2 2.2 2.3 2.4  --   --  2.5*   PHOSPHORUS 2.5 3.0 2.7 4.0  --   --   --    TSH  --   --   --   --   --   --  1.497    < > = values in this interval not displayed.         Lab 04/14/24 1923   TOTAL PROTEIN 7.2   ALBUMIN 3.4*   GLOBULIN 3.8   ALT (SGPT) 17   AST (SGOT) 20   BILIRUBIN 0.2   ALK PHOS 193*         Lab 04/14/24 2148 04/14/24 1923   PROBNP  --  807.4   HSTROP T 111* 118*             Lab 04/15/24  0306 04/14/24  2148   IRON  --  46*   IRON SATURATION (TSAT)  --  17*   TIBC  --  271*   TRANSFERRIN  --  182*   FERRITIN 117.90  --          Lab 04/14/24 2032   PH, ARTERIAL 7.265*   PCO2, ARTERIAL 89.5*   PO2 ART 97.2   O2 SATURATION ART 96.7   FIO2 36   HCO3 ART 39.7*   BASE EXCESS ART 10.7*   CARBOXYHEMOGLOBIN 0.8     Brief Urine Lab Results  (Last result in the past 365 days)        Color   Clarity   Blood   Leuk Est   Nitrite   Protein   CREAT   Urine HCG        02/29/24 1939             21.6               Microbiology Results (last 10 days)       Procedure Component Value - Date/Time    Anaerobic Culture - Pleural Fluid, Pleural Cavity [[id number]]  (Normal) Collected: 04/16/24 1236    Lab Status:  Preliminary result Specimen: Pleural Fluid from Pleural Cavity Updated: 04/19/24 0757     Anaerobic Culture No anaerobes isolated at 3 days    AFB Culture - Body Fluid, Pleural Cavity [[id number]] Collected: 04/16/24 1235    Lab Status: Preliminary result Specimen: Body Fluid from Pleural Cavity Updated: 04/16/24 1536     AFB Stain No acid fast bacilli seen    Body Fluid Culture - Body Fluid, Pleural Cavity [[id number]] Collected: 04/16/24 1235    Lab Status: Final result Specimen: Body Fluid from Pleural Cavity Updated: 04/19/24 0814     Body Fluid Culture No growth at 3 days     Gram Stain Moderate (3+) WBCs seen      No organisms seen    Respiratory Culture - Sputum, Cough [[id number]]  (Abnormal) Collected: 04/16/24 0912    Lab Status: Preliminary result Specimen: Sputum from Cough Updated: 04/19/24 1014     Respiratory Culture Heavy growth (4+) Pseudomonas species     Gram Stain Moderate (3+) WBCs per low power field      Rare (1+) Epithelial cells per low power field      Few (2+) Gram positive cocci in pairs and chains      Moderate (3+) Gram negative bacilli    Narrative:            Respiratory Panel PCR w/COVID-19(SARS-CoV-2) OSMIN/ROBERTA/OCHOA/PAD/COR/LUIS In-House, NP Swab in UTM/VTM, 2 HR TAT - Swab, Nasopharynx [[id number]]  (Normal) Collected: 04/15/24 1514    Lab Status: Final result Specimen: Swab from Nasopharynx Updated: 04/15/24 1627     ADENOVIRUS, PCR Not Detected     Coronavirus 229E Not Detected     Coronavirus HKU1 Not Detected     Coronavirus NL63 Not Detected     Coronavirus OC43 Not Detected     COVID19 Not Detected     Human Metapneumovirus Not Detected     Human Rhinovirus/Enterovirus Not Detected     Influenza A PCR Not Detected     Influenza B PCR Not Detected     Parainfluenza Virus 1 Not Detected     Parainfluenza Virus 2 Not Detected     Parainfluenza Virus 3 Not Detected     Parainfluenza Virus 4 Not Detected     RSV, PCR Not Detected     Bordetella pertussis pcr Not Detected     Bordetella  parapertussis PCR Not Detected     Chlamydophila pneumoniae PCR Not Detected     Mycoplasma pneumo by PCR Not Detected    Narrative:      In the setting of a positive respiratory panel with a viral infection PLUS a negative procalcitonin without other underlying concern for bacterial infection, consider observing off antibiotics or discontinuation of antibiotics and continue supportive care. If the respiratory panel is positive for atypical bacterial infection (Bordetella pertussis, Chlamydophila pneumoniae, or Mycoplasma pneumoniae), consider antibiotic de-escalation to target atypical bacterial infection.    Legionella Antigen, Urine - Urine, Urine, Clean Catch [[id number]]  (Normal) Collected: 04/15/24 1514    Lab Status: Final result Specimen: Urine, Clean Catch Updated: 04/15/24 1558     LEGIONELLA ANTIGEN, URINE Negative    S. Pneumo Ag Urine or CSF - Urine, Urine, Clean Catch [[id number]]  (Normal) Collected: 04/15/24 1514    Lab Status: Final result Specimen: Urine, Clean Catch Updated: 04/15/24 1558     Strep Pneumo Ag Negative    Blood Culture - Blood, Arm, Right [[id number]]  (Normal) Collected: 04/14/24 1923    Lab Status: Preliminary result Specimen: Blood from Arm, Right Updated: 04/18/24 1931     Blood Culture No growth at 4 days    Blood Culture - Blood, Arm, Left [[id number]]  (Normal) Collected: 04/14/24 1923    Lab Status: Preliminary result Specimen: Blood from Arm, Left Updated: 04/18/24 1931     Blood Culture No growth at 4 days            XR Chest 1 View    Result Date: 4/16/2024  Impression:  1. No pneumothorax status post thoracentesis 2. Stable multifocal airspace disease   Electronically Signed ByCarolyn Cortez On:4/16/2024 3:30 PM      CT Chest Without Contrast Diagnostic    Result Date: 4/15/2024  1.  Moderate bilateral pleural effusions. Bibasilar opacity right greater than left. Pneumonia most likely. 2.  Noncalcified nodule in the lingula measuring 9 mm. Scattered areas of groundglass  with mild septal thickening likely due to superimposed pulmonary edema. 3.  Severe varicose and cystic bronchiectasis. 4.  New opacity in the lateral right upper lobe atelectasis versus pneumonia. 5.  A follow-up CT chest in 3 months time is recommended to evaluate for the resolution of the noncalcified nodules and parenchymal opacities.  Electronically Signed By-Francine Garner MD On:4/15/2024 5:19 PM      XR Chest 1 View    Result Date: 4/14/2024  Chronic changes in both lungs are similar to prior. Increased interstitial opacities bilaterally may reflect superimposed pulmonary edema. A small amount of right pleural fluid may also be present.  Electronically Signed By-Dr. Edison Guthrie MD On:4/14/2024 7:37 PM      XR CHEST 1 VW    Result Date: 4/13/2024  There has been no significant interval change.      Results for orders placed during the hospital encounter of 04/14/24    Doppler Arterial Multi Level Lower Extremity - Bilateral CAR    Interpretation Summary  •  Right Conclusion: The right KEYLA is unable to be assessed due to vessel incompressibility. Normal digital pressures.  •  Left Conclusion: The left KEYLA is unable to be assessed due to vessel incompressibility. Unable to assess digital ischemia.  •  Nonocclusive, poorly compressible tibial level disease bilaterally.  Normal waveforms are present through the ankle levels bilaterally.      Results for orders placed during the hospital encounter of 04/14/24    Doppler Arterial Multi Level Lower Extremity - Bilateral CAR    Interpretation Summary  •  Right Conclusion: The right KEYLA is unable to be assessed due to vessel incompressibility. Normal digital pressures.  •  Left Conclusion: The left KEYLA is unable to be assessed due to vessel incompressibility. Unable to assess digital ischemia.  •  Nonocclusive, poorly compressible tibial level disease bilaterally.  Normal waveforms are present through the ankle levels bilaterally.      Results for orders placed during  the hospital encounter of 02/16/24    Adult Transthoracic Echo Complete W/ Cont if Necessary Per Protocol    Interpretation Summary  •  Left ventricular systolic function is normal. Left ventricular ejection fraction appears to be 61 - 65%.  •  Left ventricular wall thickness is consistent with mild concentric hypertrophy.  •  Left ventricular diastolic function is consistent with (grade I) impaired relaxation.  •  There are no significant valvular abnormalities.      Labs Pending at Discharge:  Pending Labs       Order Current Status    Fungus Culture - Body Fluid, Pleural Cavity In process    AFB Culture - Body Fluid, Pleural Cavity Preliminary result    Anaerobic Culture - Pleural Fluid, Pleural Cavity Preliminary result    Blood Culture - Blood, Arm, Left Preliminary result    Blood Culture - Blood, Arm, Right Preliminary result    Respiratory Culture - Sputum, Cough Preliminary result              Time spent on Discharge including face to face service:  MORE THAN 35 minutes    Electronically signed by Nguyễn Farnsworth DO, 04/19/24, 10:47 AM EDT.

## 2024-04-19 NOTE — OUTREACH NOTE
Prep Survey      Flowsheet Row Responses   [facility] patient discharged from? [place]   Is LACE score < 7 ? No   Eligibility [facility] [place]   Date of Admission 04/14/24   Date of Discharge 04/19/24   Discharge Disposition Home-Health Care Sv   Discharge diagnosis Acute on chronic respiratory failure with hypercapnia   Does the patient have one of the following disease processes/diagnoses(primary or secondary)? COPD   Does the patient have Home health ordered? Yes   What is the Home health agency?  [facility]   Is there a DME ordered? No   Comments regarding appointments Ambulatory Referral to [facility]   Medication alerts for this patient see AVS   Prep survey completed? Yes            Gwendolyn GOMEZ - Registered Nurse

## 2024-04-19 NOTE — CASE MANAGEMENT/SOCIAL WORK
RT CM confirmed with [employer] Rep that device has shipped and  has been in contact with patient's spouse.  Grecia IRIS was notified that patient will discharge home today.

## 2024-04-20 LAB
BACTERIA SPEC RESP CULT: ABNORMAL
GRAM STN SPEC: ABNORMAL
QT INTERVAL: 422 MS
QTC INTERVAL: 439 MS

## 2024-04-20 NOTE — PAYOR COMM NOTE
"Preston Wallis \"Gómez\" (58 y.o. Male)       Date of Birth   1965    Social Security Number       Address   [address] [place] [address]    Home Phone       MRN   [id number]       Anabaptist   Restorationist    Marital Status                               Admission Date   4/14/24    Admission Type   Emergency    Admitting Provider   Charles So MD    Attending Provider       Department, Room/Bed   [facility] 5TH FLOOR SURGICAL TELEMETRY UNIT, 525/2       Discharge Date   4/19/2024    Discharge Disposition   Home or Self Care    Discharge Destination                                 Attending Provider: (none)   Allergies: Benadryl [Diphenhydramine], Proventil [Albuterol]    Isolation: None   Infection: MRSA/History Only (12/15/23)   Code Status: Prior    Ht: 175.3 cm (69\")   Wt: 116 kg (255 lb 1.2 oz)    Admission Cmt: None   Principal Problem: Acute on chronic respiratory failure with hypercapnia [J96.22]                   Active Insurance as of 4/14/2024       Primary Coverage       Payor Plan Insurance Group Employer/Plan Group    [facility] BY YING [facility] BY YING [id number]       Payor Plan Address Payor Plan Phone Number Payor Plan Fax Number Effective Dates    [address] [address]   3/1/2024 - None Entered    [facility] [id number]         Subscriber Name Subscriber Birth Date Member ID       PRESTON WALLIS 1965 [id number]                     Emergency Contacts        (Rel.) Home Phone Work Phone Mobile Phone    BimalKami G (Spouse) [phone]    KayleyElaine (Daughter) -- -- [phone]        [id number]       Physician Progress Notes (last 24 hours)  Notes from 04/19/24 1715 through 04/20/24 1715   No notes of this type exist for this encounter.          Discharge Summary        Nguyễn Farnsworth DO at 04/19/24 1047                         [facility]    Patient Name: " Preston Wallis  : 1965  MRN: [id number]    Date of Admission: 2024  Date of Discharge:  2024    Primary Care Physician: Kimmy Riley MD    Consults       Date and Time Order Name Status Description    4/15/2024  4:23 PM Inpatient Pulmonology Consult Completed     4/15/2024  3:49 AM Inpatient Podiatry Consult Completed     2024  9:55 PM Inpatient Hospitalist Consult              Active and Resolved Hospital Problems:  Active Hospital Problems    Diagnosis POA    **Acute on chronic respiratory failure with hypercapnia [J96.22] Yes    Chronic respiratory failure with hypoxia and hypercapnia [J96.11, J96.12] Yes    Wheezing [R06.2] Yes    Ulcer of left foot, limited to breakdown of skin [L97.521] Yes    Type 2 DM with CKD stage 3 and hypertension [E11.22, I12.9, N18.30] Yes    Stage 3b chronic kidney disease [N18.32] Yes    Tobacco abuse, in remission [F17.201] Yes    Paroxysmal atrial fibrillation [I48.0] Yes    Obstructive sleep apnea [G47.33] Yes    COPD exacerbation [J44.1] Yes    Seizures [R56.9] Yes      Resolved Hospital Problems   No resolved problems to display.       Hospital Course     Hospital Course:  Preston Wallis is a 58 y.o. male  with past medical history of severe COPD with an FEV1 of 26%, CHF, anemia history of recurrent Pseudomonas infections with MDRO Pseudomonas, history of PE in 2019 on Xarelto, tobacco abuse of cigarettes in remission, MILADY with home NIV, morbid obesity, who presented to the ED for evaluation of shortness of breath.    In the ED, ABG was 7.2 , troponins were 118, proBNP 807.   CXR demonstrated chronic changes in both lungs similar to prior exams with increased interstitial opacities bilaterally possibly reflecting pulmonary edema.  Small amount of right pleural fluid may also be present.  Patient stateed that for the last week or so he has been having worsening shortness of breath with worsening lower extremity edema orthopnea,  generalized weakness and fatigue.  Patient was seen at this facility a few weeks ago with similar complaints and was treated for a CHF exacerbation.  Due to his worsening symptoms patient came to the ED for further evaluation.  In the ED patient was hypoxic on arrival requiring oxygen supplementation via nasal cannula with remaining vitals being within normal limits.  Labs showed he had significant anemia with elevated proBNP level, reduced renal function, ABG showing hypercapnic respiratory failure.  Troponin was elevated but delta was negative.  Chest x-ray showed chronic findings along with increased pulmonary edema.   Patient admitted for evaluation and treatment.  Patient was admitted to the hospital and received aggressive IV diuresis.  CT of the chest will need to be repeated in 3 months to ensure resolution of his effusions and pneumonia.  Patient was seen by pulmonary he was continued on bronchodilators and his home BiPAP machine patient did receive a course of antibiotics and has been transitioned over to oral antibiotics to complete a total of 7 days as well as a total of 7 days of oral steroids.  Patient can resume his home Bumex dose.  Patient was continued on his Eliquis for his atrial fibrillation.  Patient was continued on Keppra for his seizure disorder.  Patient did really see wound care for his chronic lower extremity foot wounds.  Patient overall is significantly better and will discharge home today with home health services.  Patient will be discharged home in stable condition.     DISCHARGE Follow Up Recommendations for labs and diagnostics:   Patient needs close follow-up with his primary care provider in 1 week.  Patient should also follow-up with his specialists as scheduled  REPEAT CT OF CHEST IN 3 MONTHS       PLEASE NOTE AFTER PATIENT LEFT HIS SPUTUM CULTURE IS GROWING PSEUDOMONAS. I HAVE CALLED HIS WIFE AND UPDATED HER AND SENT AN RX FOR LEVAQUIN TO HIS PHARMACY TO COVER THIS INFECTION.  DR. SANTORO ALSO UPDATED. PATIENT HAS FOLLOW UP IN THE PULMONARY CLINIC.     Day of Discharge     Vital Signs:  Temp:  [97.5 °F (36.4 °C)-98.8 °F (37.1 °C)] 98.6 °F (37 °C)  Heart Rate:  [62-88] 70  Resp:  [16-20] 16  BP: (115-155)/(53-65) 115/53  Flow (L/min):  [2-3] 2  Physical Exam:   General: alert no distress, sitting on side of bed    Lungs: Clear, MILD wheezing   CV: RRR lower extremity pitting edema improved   Obese abdomen nontender  ANO x 3 much more appropriate overall  Lower extremity diabetic foot ulcer dressed        LEVAQUIN ALSO SENT FOR PSEUDOMONAS     Discharge Details        Discharge Medications        New Medications        Instructions Start Date   doxycycline 100 MG capsule  Commonly known as: MONODOX  Replaces: doxycycline 100 MG tablet   100 mg, Oral, Every 12 Hours Scheduled      predniSONE 20 MG tablet  Commonly known as: DELTASONE   40 mg, Oral, Daily With Breakfast   Start Date: April 20, 2024            Changes to Medications        Instructions Start Date   finasteride 5 MG tablet  Commonly known as: PROSCAR  What changed: when to take this   5 mg, Oral, Daily      folic acid 1 MG tablet  Commonly known as: FOLVITE  What changed: when to take this   1 mg, Oral, Daily             Continue These Medications        Instructions Start Date   apixaban 5 MG tablet tablet  Commonly known as: Eliquis   5 mg, Oral, Every 12 Hours      aspirin 81 MG EC tablet   81 mg, Oral, Daily      atorvastatin 20 MG tablet  Commonly known as: LIPITOR   20 mg, Oral, Nightly      Breztri Aerosphere 160-9-4.8 MCG/ACT aerosol inhaler  Generic drug: Budeson-Glycopyrrol-Formoterol   2 puffs, Inhalation, 2 Times Daily, Rinse mouth out after each use      bumetanide 2 MG tablet  Commonly known as: BUMEX   Take 1 tablet by mouth 2 (Two) Times a Day.      busPIRone 15 MG tablet  Commonly known as: BUSPAR   15 mg, Oral, 2 Times Daily PRN      CALCIUM CITRATE PO   1 tablet, Oral, Nightly      carvedilol 25 MG  tablet  Commonly known as: COREG   25 mg, Oral, Every 12 Hours Scheduled      docusate sodium 100 MG capsule  Commonly known as: COLACE   100 mg, Oral, 2 Times Daily PRN      DULoxetine 30 MG capsule  Commonly known as: CYMBALTA   30 mg, Oral, Daily      famotidine 40 MG tablet  Commonly known as: PEPCID   40 mg, Oral, Daily      Farxiga 5 MG tablet tablet  Generic drug: dapagliflozin   5 mg, Oral, Daily      ferrous gluconate 324 MG tablet  Commonly known as: FERGON   324 mg, Oral, Daily With Breakfast      Insulin Lispro 100 UNIT/ML injection  Commonly known as: humaLOG   8 Units, Subcutaneous, 3 Times Daily Before Meals, Per sliding scale      ipratropium-albuterol 0.5-2.5 mg/3 ml nebulizer  Commonly known as: DUO-NEB   3 mL, Nebulization, Every 4 Hours PRN      Levemir 100 UNIT/ML injection  Generic drug: insulin detemir   10 Units, Subcutaneous, Nightly      levETIRAcetam 500 MG tablet  Commonly known as: KEPPRA   500 mg, Oral, 2 Times Daily      magnesium oxide 400 tablet tablet  Commonly known as: MAG-OX   400 mg, Oral, Nightly      montelukast 10 MG tablet  Commonly known as: SINGULAIR   10 mg, Oral, Nightly      multivitamin with iron tablet tablet   1 tablet, Oral, Daily      NIFEdipine XL 30 MG 24 hr tablet  Commonly known as: PROCARDIA XL   30 mg, Oral, Every Morning      O2  Commonly known as: OXYGEN   2 Liter O2 - CONTINUOUS (route: Oxygen)      traZODone 50 MG tablet  Commonly known as: DESYREL   50 mg, Oral, Nightly      Ventolin  (90 Base) MCG/ACT inhaler  Generic drug: albuterol sulfate HFA   2 puffs, Inhalation, Every 4 Hours PRN      vitamin C 500 MG tablet  Commonly known as: ASCORBIC ACID   500 mg, Oral, 2 Times Daily      Vitamin D3 50 MCG (2000 UT) tablet  Generic drug: Cholecalciferol   50 mcg, Oral, Daily             Stop These Medications      doxycycline 100 MG tablet  Commonly known as: ADOXA  Replaced by: doxycycline 100 MG capsule              Allergies   Allergen Reactions     Benadryl [Diphenhydramine] Itching    Proventil [Albuterol] Other (See Comments)     Mouth sores         Discharge Disposition:  Home or Self Care    Diet:  Hospital:  Diet Order   Procedures    Diet: Cardiac, Fluid Restriction (240 mL/tray); Low Sodium (2g); 1500 mL/day; Fluid Consistency: Thin (IDDSI 0)       Discharge Activity: AS TOLERATED       CODE STATUS:  Code Status and Medical Interventions:   Ordered at: 04/15/24 0954     Code Status (Patient has no pulse and is not breathing):    CPR (Attempt to Resuscitate)     Medical Interventions (Patient has pulse or is breathing):    Full Support         Future Appointments   Date Time Provider Department Center   4/29/2024  9:45 AM Jordon Fuentes DPM [facility] POD [facility]   4/29/2024 11:30 AM Lino Ware MD [facility] PASTORA [facility]   5/2/2024  3:30 PM Betzaida Nelson APRN [facility] PCC BAR [facility]   5/20/2024 10:30 AM Teresita White MD [facility] U ETWOOD [facility]   6/28/2024 11:40 AM Neli Paredes APRCHARITY [facility] DIAB BT [facility]AU[facility]   7/11/2024 11:00 AM Kimmy Riley MD [facility] PC BARDS [facility]   7/31/2024 10:45 AM Lino Ware MD [facility] CD BARDS [facility]       Additional Instructions for the Follow-ups that You Need to Schedule       Discharge Follow-up with PCP   As directed       Currently Documented PCP:    Kimmy Riley MD    PCP Phone Number:    [phone]     Follow Up Details: in 1 week                Pertinent  and/or Most Recent Results     PROCEDURES:   THORACENTESIS     LAB RESULTS:      Lab 04/19/24  0356 04/18/24  0345 04/17/24  0359 04/16/24  0353 04/15/24  0306 04/14/24  1923   WBC 11.61* 10.33 12.62* 6.52 8.00 7.89   HEMOGLOBIN 8.3* 8.2* 7.8* 7.9* 7.5* 7.4*   HEMATOCRIT 27.3* 27.0* 26.6* 26.5* 25.7* 26.0*   PLATELETS 302 303 319 334 327 312   NEUTROS ABS 8.66* 7.45* 9.92* 5.16  --  4.75   IMMATURE GRANS (ABS) 0.23* 0.16* 0.10* 0.08*  --  0.07*   LYMPHS ABS 1.39 1.41 1.31 0.88  --  1.62   MONOS ABS 1.31* 1.29* 1.28* 0.39  --  0.90   EOS ABS 0.00 0.00  0.00 0.00  --  0.51*   MCV 87.8 89.1 91.4 92.0 93.5 94.9   PROCALCITONIN  --   --   --   --  0.11  --    LACTATE  --   --   --   --   --  0.8         Lab 04/19/24  0356 04/18/24  0345 04/17/24  0359 04/16/24  0353 04/15/24  0306 04/14/24  1923 04/13/24  0022   SODIUM 134* 137 135* 137 136   < >  --    POTASSIUM 4.0 4.3 4.6 4.8 4.4   < >  --    CHLORIDE 87* 90* 90* 94* 92*   < >  --    CO2 37.7* 38.4* 36.6* 36.3* 36.7*   < >  --    ANION GAP 9.3 8.6 8.4 6.7 7.3   < >  --    BUN 54* 47* 38* 29* 23*   < >  --    CREATININE 2.26* 2.27* 2.19* 1.72* 1.90*   < >  --    EGFR 32.8* 32.6* 34.1* 45.5* 40.4*   < >  --    GLUCOSE 283* 215* 154* 224* 158*   < >  --    CALCIUM 8.4* 8.7 8.5* 8.2* 8.5*   < >  --    MAGNESIUM 2.2 2.2 2.3 2.4  --   --  2.5*   PHOSPHORUS 2.5 3.0 2.7 4.0  --   --   --    TSH  --   --   --   --   --   --  1.497    < > = values in this interval not displayed.         Lab 04/14/24 1923   TOTAL PROTEIN 7.2   ALBUMIN 3.4*   GLOBULIN 3.8   ALT (SGPT) 17   AST (SGOT) 20   BILIRUBIN 0.2   ALK PHOS 193*         Lab 04/14/24 2148 04/14/24 1923   PROBNP  --  807.4   HSTROP T 111* 118*             Lab 04/15/24  0306 04/14/24  2148   IRON  --  46*   IRON SATURATION (TSAT)  --  17*   TIBC  --  271*   TRANSFERRIN  --  182*   FERRITIN 117.90  --          Lab 04/14/24 2032   PH, ARTERIAL 7.265*   PCO2, ARTERIAL 89.5*   PO2 ART 97.2   O2 SATURATION ART 96.7   FIO2 36   HCO3 ART 39.7*   BASE EXCESS ART 10.7*   CARBOXYHEMOGLOBIN 0.8     Brief Urine Lab Results  (Last result in the past 365 days)        Color   Clarity   Blood   Leuk Est   Nitrite   Protein   CREAT   Urine HCG        02/29/24 1939             21.6               Microbiology Results (last 10 days)       Procedure Component Value - Date/Time    Anaerobic Culture - Pleural Fluid, Pleural Cavity [[id number]]  (Normal) Collected: 04/16/24 1236    Lab Status: Preliminary result Specimen: Pleural Fluid from Pleural Cavity Updated: 04/19/24 0757     Anaerobic  Culture No anaerobes isolated at 3 days    AFB Culture - Body Fluid, Pleural Cavity [[id number]] Collected: 04/16/24 1235    Lab Status: Preliminary result Specimen: Body Fluid from Pleural Cavity Updated: 04/16/24 1536     AFB Stain No acid fast bacilli seen    Body Fluid Culture - Body Fluid, Pleural Cavity [[id number]] Collected: 04/16/24 1235    Lab Status: Final result Specimen: Body Fluid from Pleural Cavity Updated: 04/19/24 0814     Body Fluid Culture No growth at 3 days     Gram Stain Moderate (3+) WBCs seen      No organisms seen    Respiratory Culture - Sputum, Cough [[id number]]  (Abnormal) Collected: 04/16/24 0912    Lab Status: Preliminary result Specimen: Sputum from Cough Updated: 04/19/24 1014     Respiratory Culture Heavy growth (4+) Pseudomonas species     Gram Stain Moderate (3+) WBCs per low power field      Rare (1+) Epithelial cells per low power field      Few (2+) Gram positive cocci in pairs and chains      Moderate (3+) Gram negative bacilli    Narrative:            Respiratory Panel PCR w/COVID-19(SARS-CoV-2) OSMIN/ROBERTA/OCHOA/PAD/COR/LUIS In-House, NP Swab in UTM/VTM, 2 HR TAT - Swab, Nasopharynx [[id number]]  (Normal) Collected: 04/15/24 1514    Lab Status: Final result Specimen: Swab from Nasopharynx Updated: 04/15/24 1627     ADENOVIRUS, PCR Not Detected     Coronavirus 229E Not Detected     Coronavirus HKU1 Not Detected     Coronavirus NL63 Not Detected     Coronavirus OC43 Not Detected     COVID19 Not Detected     Human Metapneumovirus Not Detected     Human Rhinovirus/Enterovirus Not Detected     Influenza A PCR Not Detected     Influenza B PCR Not Detected     Parainfluenza Virus 1 Not Detected     Parainfluenza Virus 2 Not Detected     Parainfluenza Virus 3 Not Detected     Parainfluenza Virus 4 Not Detected     RSV, PCR Not Detected     Bordetella pertussis pcr Not Detected     Bordetella parapertussis PCR Not Detected     Chlamydophila pneumoniae PCR Not Detected     Mycoplasma pneumo by  PCR Not Detected    Narrative:      In the setting of a positive respiratory panel with a viral infection PLUS a negative procalcitonin without other underlying concern for bacterial infection, consider observing off antibiotics or discontinuation of antibiotics and continue supportive care. If the respiratory panel is positive for atypical bacterial infection (Bordetella pertussis, Chlamydophila pneumoniae, or Mycoplasma pneumoniae), consider antibiotic de-escalation to target atypical bacterial infection.    Legionella Antigen, Urine - Urine, Urine, Clean Catch [[id number]]  (Normal) Collected: 04/15/24 1514    Lab Status: Final result Specimen: Urine, Clean Catch Updated: 04/15/24 1558     LEGIONELLA ANTIGEN, URINE Negative    S. Pneumo Ag Urine or CSF - Urine, Urine, Clean Catch [[id number]]  (Normal) Collected: 04/15/24 1514    Lab Status: Final result Specimen: Urine, Clean Catch Updated: 04/15/24 1558     Strep Pneumo Ag Negative    Blood Culture - Blood, Arm, Right [[id number]]  (Normal) Collected: 04/14/24 1923    Lab Status: Preliminary result Specimen: Blood from Arm, Right Updated: 04/18/24 1931     Blood Culture No growth at 4 days    Blood Culture - Blood, Arm, Left [[id number]]  (Normal) Collected: 04/14/24 1923    Lab Status: Preliminary result Specimen: Blood from Arm, Left Updated: 04/18/24 1931     Blood Culture No growth at 4 days            XR Chest 1 View    Result Date: 4/16/2024  Impression:  1. No pneumothorax status post thoracentesis 2. Stable multifocal airspace disease   Electronically Signed ByCarolyn Cortez On:4/16/2024 3:30 PM      CT Chest Without Contrast Diagnostic    Result Date: 4/15/2024  1.  Moderate bilateral pleural effusions. Bibasilar opacity right greater than left. Pneumonia most likely. 2.  Noncalcified nodule in the lingula measuring 9 mm. Scattered areas of groundglass with mild septal thickening likely due to superimposed pulmonary edema. 3.  Severe varicose and cystic  bronchiectasis. 4.  New opacity in the lateral right upper lobe atelectasis versus pneumonia. 5.  A follow-up CT chest in 3 months time is recommended to evaluate for the resolution of the noncalcified nodules and parenchymal opacities.  Electronically Signed By-Francine Garner MD On:4/15/2024 5:19 PM      XR Chest 1 View    Result Date: 4/14/2024  Chronic changes in both lungs are similar to prior. Increased interstitial opacities bilaterally may reflect superimposed pulmonary edema. A small amount of right pleural fluid may also be present.  Electronically Signed By-Dr. Edison Guthrie MD On:4/14/2024 7:37 PM      XR CHEST 1 VW    Result Date: 4/13/2024  There has been no significant interval change.      Results for orders placed during the hospital encounter of 04/14/24    Doppler Arterial Multi Level Lower Extremity - Bilateral CAR    Interpretation Summary    Right Conclusion: The right KEYLA is unable to be assessed due to vessel incompressibility. Normal digital pressures.    Left Conclusion: The left KEYLA is unable to be assessed due to vessel incompressibility. Unable to assess digital ischemia.    Nonocclusive, poorly compressible tibial level disease bilaterally.  Normal waveforms are present through the ankle levels bilaterally.      Results for orders placed during the hospital encounter of 04/14/24    Doppler Arterial Multi Level Lower Extremity - Bilateral CAR    Interpretation Summary    Right Conclusion: The right KEYLA is unable to be assessed due to vessel incompressibility. Normal digital pressures.    Left Conclusion: The left KEYLA is unable to be assessed due to vessel incompressibility. Unable to assess digital ischemia.    Nonocclusive, poorly compressible tibial level disease bilaterally.  Normal waveforms are present through the ankle levels bilaterally.      Results for orders placed during the hospital encounter of 02/16/24    Adult Transthoracic Echo Complete W/ Cont if Necessary Per  Protocol    Interpretation Summary    Left ventricular systolic function is normal. Left ventricular ejection fraction appears to be 61 - 65%.    Left ventricular wall thickness is consistent with mild concentric hypertrophy.    Left ventricular diastolic function is consistent with (grade I) impaired relaxation.    There are no significant valvular abnormalities.      Labs Pending at Discharge:  Pending Labs       Order Current Status    Fungus Culture - Body Fluid, Pleural Cavity In process    AFB Culture - Body Fluid, Pleural Cavity Preliminary result    Anaerobic Culture - Pleural Fluid, Pleural Cavity Preliminary result    Blood Culture - Blood, Arm, Left Preliminary result    Blood Culture - Blood, Arm, Right Preliminary result    Respiratory Culture - Sputum, Cough Preliminary result              Time spent on Discharge including face to face service:  MORE THAN 35 minutes    Electronically signed by Nguyễn Farnsworth DO, 04/19/24, 10:47 AM EDT.      Electronically signed by Nguyễn Farnsworth DO at 04/19/24 1430           [facility] [phone]  F [phone]

## 2024-04-21 ENCOUNTER — TRANSITIONAL CARE MANAGEMENT TELEPHONE ENCOUNTER (OUTPATIENT)
Dept: CALL CENTER | Facility: HOSPITAL | Age: 59
End: 2024-04-21
Payer: COMMERCIAL

## 2024-04-21 LAB
BACTERIA SPEC ANAEROBE CULT: NORMAL
FUNGUS WND CULT: NORMAL
MYCOBACTERIUM SPEC CULT: NORMAL
NIGHT BLUE STAIN TISS: NORMAL

## 2024-04-21 NOTE — OUTREACH NOTE
Call Center TCM Note      Flowsheet Row Responses   [facility] facility patient discharged from? [place]   Does the patient have one of the following disease processes/diagnoses(primary or secondary)? COPD   TCM attempt successful? No   Unsuccessful attempts Attempt 1            Bonita Finn RN    4/21/2024, 09:31 EDT

## 2024-04-22 ENCOUNTER — PATIENT OUTREACH (OUTPATIENT)
Dept: CASE MANAGEMENT | Facility: OTHER | Age: 59
End: 2024-04-22
Payer: COMMERCIAL

## 2024-04-22 ENCOUNTER — TRANSITIONAL CARE MANAGEMENT TELEPHONE ENCOUNTER (OUTPATIENT)
Dept: CALL CENTER | Facility: HOSPITAL | Age: 59
End: 2024-04-22
Payer: COMMERCIAL

## 2024-04-22 DIAGNOSIS — H93.8X3 SENSATION OF FULLNESS IN BOTH EARS: Primary | ICD-10-CM

## 2024-04-22 NOTE — OUTREACH NOTE
Call Center TCM Note      Flowsheet Row Responses   [facility] patient discharged from? [place]   Does the patient have one of the following disease processes/diagnoses(primary or secondary)? COPD   TCM attempt successful? Yes  [VR for Kami, wife]   Call start time 1048   Unsuccessful attempts Attempt 2   Call end time 1053   General alerts for this patient Patient is followed by ACM   Discharge diagnosis Acute on chronic respiratory failure with hypercapnia   Meds reviewed with patient/caregiver? Yes   Is the patient having any side effects they believe may be caused by any medication additions or changes? Yes   Side effects comments  Wife reports BG elevated due to steroid use, has a few days remaining of use.  Reports highest since d/c of 545 and AC bk today was 248.  Providing insulin per SS as well as levemir at HS.  Wife reports providing ice chips to pt, encouraged to watch carb intake especially while on steroids and encouraged to consider reaching out to his DM APRN in the event that med adjustments need to be considered while on steroids--wife v/u but prefers to monitor at this time.   Does the patient have all medications ordered at discharge? Yes   Is the patient taking all medications as directed (includes completed medication regime)? Yes   Medication comments Pt started on levaquin post d/c as sputum c/s positive for pseudomonas   Comments pt with upcoming appts for podiatry, neuro and pulmonary   Does the patient have an appointment with their PCP within 7-14 days of discharge? No appointments available   Nursing Interventions Routed TCM call to PCP office   What is the Home health agency?  [facility]   Has home health visited the patient within 72 hours of discharge? Yes   DME comments O2 @ 2 lpm with sats 94-95%, Bipap at HS   Pulse Ox monitoring Intermittent   Pulse Ox device source Patient   O2 Sat: education provided Sat levels   Psychosocial issues? No   Did the patient receive  a copy of their discharge instructions? Yes   Nursing interventions Reviewed instructions with patient   What is the patient's perception of their health status since discharge? Improving  [Wife reports pt is better with improvement in SOA, aware to monitor for worsening s/s and encouraged to watch for edema to feet/ankles,taking diuretics as ordered. HH to follow.]   Nursing Interventions Nurse provided patient education   Is the patient/caregiver able to teach back the hierarchy of who to call/visit for symptoms/problems? PCP, Specialist, Home health nurse, Urgent Care, ED, 911 Yes   TCM call completed? Yes   Call end time 1053            Carmen Zurita, LEN    4/22/2024, 11:03 EDT

## 2024-04-22 NOTE — PROGRESS NOTES
Patient is scheduled for May 6 with PCP.  He has follow up with neurology, podiatry and pulmonology scheduled.  He did come home with a percussion vest and using cpap.  Sent home on antibiotics and steroids.  Cautioned about increase in blood sugar.  Administering 10 units of Lantus bid for now instead of qd.  [facility] is following for home health.

## 2024-04-22 NOTE — OUTREACH NOTE
AMBULATORY CASE MANAGEMENT NOTE    Names and Relationships of Patient/Support Persons: Caller: Kami Wallis; Relationship: Emergency Contact -       Reached out to Kami Wallis regarding Gómez's discharge.  He was discharged with [facility].  Scheduled a follow up, not within 2 weeks due to provider schedule, however he does have the appropriate follow up with pulmonology, podiatry.  He was prescribed steroids and antibiotics.  He also was prescribed chest vest to use 2 x daily.      Tara GOMEZ  Ambulatory Case Management    4/22/2024, 16:40 EDT

## 2024-04-23 LAB
FUNGUS WND CULT: NORMAL
MYCOBACTERIUM SPEC CULT: NORMAL
NIGHT BLUE STAIN TISS: NORMAL

## 2024-04-25 ENCOUNTER — TELEPHONE (OUTPATIENT)
Dept: FAMILY MEDICINE CLINIC | Age: 59
End: 2024-04-25
Payer: COMMERCIAL

## 2024-04-25 DIAGNOSIS — L97.421 SKIN ULCER OF LEFT HEEL, LIMITED TO BREAKDOWN OF SKIN: Primary | ICD-10-CM

## 2024-04-25 RX ORDER — ACETAMINOPHEN 650 MG
TABLET, EXTENDED RELEASE ORAL DAILY
COMMUNITY
End: 2024-04-25

## 2024-04-25 RX ORDER — ACETAMINOPHEN 650 MG
TABLET, EXTENDED RELEASE ORAL DAILY
Qty: 473 ML | Refills: 0 | Status: CANCELLED | OUTPATIENT
Start: 2024-04-25

## 2024-04-25 NOTE — TELEPHONE ENCOUNTER
Kami called in requesting betadine for Gómez foot.  She states when he was seen by wound care, they were treating with this and she is down to her last dose.  Pended medication for pcp to sign.    Home health called back, they have ordered all his supplies.  Will d/c Rx.

## 2024-04-26 ENCOUNTER — PATIENT OUTREACH (OUTPATIENT)
Dept: CASE MANAGEMENT | Facility: OTHER | Age: 59
End: 2024-04-26
Payer: COMMERCIAL

## 2024-04-26 DIAGNOSIS — I50.32 CHRONIC DIASTOLIC (CONGESTIVE) HEART FAILURE: Primary | ICD-10-CM

## 2024-04-26 NOTE — OUTREACH NOTE
AMBULATORY CASE MANAGEMENT NOTE    Names and Relationships of Patient/Support Persons: Caller: [facility]; Relationship: Home Health -     [facility] called to report that Gómez has 2+ edema in left leg and 1+ right.  Verbal order given for ACE wrap to bilateral legs.  She notes that his lungs sounded wet.  Encouraged that he be very vigilant with chest vest and inhalers.    His fasting blood glucose was 184 fasting, no changes.   Orders for his port given verbally is to flush port every 30 days.   Called nephrology to see if any labs need to be collected before his 5/6/24 appointment - they are faxing over lab order.      Tara GOMEZ  Ambulatory Case Management    4/26/2024, 14:55 EDT

## 2024-04-28 ENCOUNTER — HOSPITAL ENCOUNTER (INPATIENT)
Facility: HOSPITAL | Age: 59
LOS: 5 days | Discharge: HOME HEALTH | DRG: 871 | End: 2024-05-03
Attending: INTERNAL MEDICINE | Admitting: INTERNAL MEDICINE
Payer: COMMERCIAL

## 2024-04-28 ENCOUNTER — PREP FOR SURGERY (OUTPATIENT)
Dept: OTHER | Facility: HOSPITAL | Age: 59
End: 2024-04-28
Payer: COMMERCIAL

## 2024-04-28 DIAGNOSIS — J96.11 CHRONIC RESPIRATORY FAILURE WITH HYPOXIA AND HYPERCAPNIA: ICD-10-CM

## 2024-04-28 DIAGNOSIS — Z78.9 DECREASED ACTIVITIES OF DAILY LIVING (ADL): ICD-10-CM

## 2024-04-28 DIAGNOSIS — J96.12 CHRONIC RESPIRATORY FAILURE WITH HYPOXIA AND HYPERCAPNIA: ICD-10-CM

## 2024-04-28 DIAGNOSIS — R26.2 DIFFICULTY WALKING: Primary | ICD-10-CM

## 2024-04-28 PROBLEM — J96.01 ACUTE HYPOXIC ON CHRONIC HYPERCAPNIC RESPIRATORY FAILURE: Status: ACTIVE | Noted: 2024-04-28

## 2024-04-28 PROCEDURE — 94799 UNLISTED PULMONARY SVC/PX: CPT

## 2024-04-28 PROCEDURE — 94660 CPAP INITIATION&MGMT: CPT

## 2024-04-28 PROCEDURE — 99223 1ST HOSP IP/OBS HIGH 75: CPT | Performed by: FAMILY MEDICINE

## 2024-04-28 RX ORDER — SODIUM CHLORIDE 0.9 % (FLUSH) 0.9 %
10 SYRINGE (ML) INJECTION EVERY 12 HOURS SCHEDULED
Status: DISCONTINUED | OUTPATIENT
Start: 2024-04-29 | End: 2024-05-03 | Stop reason: HOSPADM

## 2024-04-28 RX ORDER — POLYETHYLENE GLYCOL 3350 17 G/17G
17 POWDER, FOR SOLUTION ORAL DAILY PRN
Status: DISCONTINUED | OUTPATIENT
Start: 2024-04-28 | End: 2024-05-03 | Stop reason: HOSPADM

## 2024-04-28 RX ORDER — BISACODYL 5 MG/1
5 TABLET, DELAYED RELEASE ORAL DAILY PRN
Status: DISCONTINUED | OUTPATIENT
Start: 2024-04-28 | End: 2024-05-03 | Stop reason: HOSPADM

## 2024-04-28 RX ORDER — SODIUM CHLORIDE 0.9 % (FLUSH) 0.9 %
10 SYRINGE (ML) INJECTION AS NEEDED
Status: DISCONTINUED | OUTPATIENT
Start: 2024-04-28 | End: 2024-04-29

## 2024-04-28 RX ORDER — SODIUM CHLORIDE 9 MG/ML
40 INJECTION, SOLUTION INTRAVENOUS AS NEEDED
Status: DISCONTINUED | OUTPATIENT
Start: 2024-04-28 | End: 2024-04-29

## 2024-04-28 RX ORDER — HEPARIN SODIUM 5000 [USP'U]/ML
5000 INJECTION, SOLUTION INTRAVENOUS; SUBCUTANEOUS EVERY 12 HOURS SCHEDULED
Status: DISCONTINUED | OUTPATIENT
Start: 2024-04-29 | End: 2024-04-29

## 2024-04-28 RX ORDER — SODIUM CHLORIDE 0.9 % (FLUSH) 0.9 %
10 SYRINGE (ML) INJECTION EVERY 12 HOURS SCHEDULED
Status: DISCONTINUED | OUTPATIENT
Start: 2024-04-29 | End: 2024-04-29

## 2024-04-28 RX ORDER — SODIUM CHLORIDE 9 MG/ML
40 INJECTION, SOLUTION INTRAVENOUS AS NEEDED
Status: DISCONTINUED | OUTPATIENT
Start: 2024-04-28 | End: 2024-05-03 | Stop reason: HOSPADM

## 2024-04-28 RX ORDER — BISACODYL 10 MG
10 SUPPOSITORY, RECTAL RECTAL DAILY PRN
Status: DISCONTINUED | OUTPATIENT
Start: 2024-04-28 | End: 2024-05-03 | Stop reason: HOSPADM

## 2024-04-28 RX ORDER — SODIUM CHLORIDE 0.9 % (FLUSH) 0.9 %
10 SYRINGE (ML) INJECTION AS NEEDED
Status: DISCONTINUED | OUTPATIENT
Start: 2024-04-28 | End: 2024-05-03 | Stop reason: HOSPADM

## 2024-04-28 RX ORDER — IBUPROFEN 600 MG/1
1 TABLET ORAL
Status: DISCONTINUED | OUTPATIENT
Start: 2024-04-28 | End: 2024-04-29

## 2024-04-28 RX ORDER — NITROGLYCERIN 0.4 MG/1
0.4 TABLET SUBLINGUAL
Status: DISCONTINUED | OUTPATIENT
Start: 2024-04-28 | End: 2024-05-03 | Stop reason: HOSPADM

## 2024-04-28 RX ORDER — NICOTINE POLACRILEX 4 MG
15 LOZENGE BUCCAL
Status: DISCONTINUED | OUTPATIENT
Start: 2024-04-28 | End: 2024-04-29

## 2024-04-28 RX ORDER — SODIUM CHLORIDE 9 MG/ML
75 INJECTION, SOLUTION INTRAVENOUS CONTINUOUS
Status: DISCONTINUED | OUTPATIENT
Start: 2024-04-29 | End: 2024-04-29

## 2024-04-28 RX ORDER — AMOXICILLIN 250 MG
2 CAPSULE ORAL 2 TIMES DAILY
Status: DISCONTINUED | OUTPATIENT
Start: 2024-04-29 | End: 2024-05-03 | Stop reason: HOSPADM

## 2024-04-28 RX ORDER — DEXTROSE MONOHYDRATE 25 G/50ML
10-50 INJECTION, SOLUTION INTRAVENOUS
Status: DISCONTINUED | OUTPATIENT
Start: 2024-04-28 | End: 2024-04-29

## 2024-04-28 NOTE — PROGRESS NOTES
Direct transfer from [facility]  ED.  Patient presented to outside ED due to lethargy and increased blood glucose of 515.  Although lethargic but  was responding appropriately.  Patient has past medical history of hypercapnic and hypoxemic respiratory failure, obstructive sleep apnea, A-fib, seizure disorder, CKD 3B, diabetes mellitus, anemia, recent CABG, BPH with self cath, and diabetic foot ulcers.  Patient was recently discharged on April 19 from our hospital because of Pseudomonas pneumonia on Levaquin.  Pleural fluid  from thoracentesis was transudative.  Workup at the outside ED showed O2 sat to be 76% on 2 L.  Other vital signs stable patient is afebrile.  Initial ABG showed pH of 7.19 pCO2 of 103 and pO2 45.  With BiPAP 40% FiO2 his ABG showed pH of 7.25 pCO2 91 and pO2 of 62.  Patient is more alert, sitting and talking.  Chest x-ray showed worsening of right more than left effusion with possible right lower lobe pneumonia.  His kidney function has gotten worse with a creatinine of 2.4.  Recent creatinine was 1.7.  White cell count is 15,000.  Repeat blood sugar is better.  Patient given IV Vanco and cefepime at the outside facility and hospitalist has been called to admit him here.  Intensivist was also consulted by outside facility for ICU admission.    Electronically signed by Gatito Higginbotham MD, 04/28/24, 7:01 PM EDT.

## 2024-04-29 ENCOUNTER — APPOINTMENT (OUTPATIENT)
Dept: GENERAL RADIOLOGY | Facility: HOSPITAL | Age: 59
DRG: 871 | End: 2024-04-29
Payer: COMMERCIAL

## 2024-04-29 ENCOUNTER — TELEPHONE (OUTPATIENT)
Dept: PODIATRY | Facility: CLINIC | Age: 59
End: 2024-04-29

## 2024-04-29 ENCOUNTER — TELEPHONE (OUTPATIENT)
Dept: NEUROLOGY | Facility: CLINIC | Age: 59
End: 2024-04-29

## 2024-04-29 ENCOUNTER — PATIENT OUTREACH (OUTPATIENT)
Dept: CASE MANAGEMENT | Facility: OTHER | Age: 59
End: 2024-04-29
Payer: COMMERCIAL

## 2024-04-29 DIAGNOSIS — J96.01 ACUTE HYPOXIC ON CHRONIC HYPERCAPNIC RESPIRATORY FAILURE: Primary | ICD-10-CM

## 2024-04-29 DIAGNOSIS — L97.421 SKIN ULCER OF LEFT HEEL, LIMITED TO BREAKDOWN OF SKIN: ICD-10-CM

## 2024-04-29 DIAGNOSIS — Z79.4 TYPE 2 DIABETES MELLITUS WITH HYPERGLYCEMIA, WITH LONG-TERM CURRENT USE OF INSULIN: ICD-10-CM

## 2024-04-29 DIAGNOSIS — E11.65 TYPE 2 DIABETES MELLITUS WITH HYPERGLYCEMIA, WITH LONG-TERM CURRENT USE OF INSULIN: ICD-10-CM

## 2024-04-29 DIAGNOSIS — N18.31 STAGE 3A CHRONIC KIDNEY DISEASE: ICD-10-CM

## 2024-04-29 DIAGNOSIS — J96.12 ACUTE HYPOXIC ON CHRONIC HYPERCAPNIC RESPIRATORY FAILURE: Primary | ICD-10-CM

## 2024-04-29 LAB
ACETONE BLD QL: NEGATIVE
ALBUMIN FLD-MCNC: 1.8 G/DL
ANION GAP SERPL CALCULATED.3IONS-SCNC: 6.9 MMOL/L (ref 5–15)
APPEARANCE FLD: ABNORMAL
ARTERIAL PATENCY WRIST A: POSITIVE
BACTERIA SPEC RESP CULT: ABNORMAL
BASE EXCESS BLDA CALC-SCNC: 4.6 MMOL/L (ref -2–2)
BASOPHILS # BLD AUTO: 0.06 10*3/MM3 (ref 0–0.2)
BASOPHILS NFR BLD AUTO: 0.4 % (ref 0–1.5)
BDY SITE: ABNORMAL
BUN SERPL-MCNC: 46 MG/DL (ref 6–20)
BUN/CREAT SERPL: 18.8 (ref 7–25)
CA-I BLDA-SCNC: 1.14 MMOL/L (ref 1.13–1.32)
CALCIUM SPEC-SCNC: 8.6 MG/DL (ref 8.6–10.5)
CHLORIDE BLDA-SCNC: 94 MMOL/L (ref 98–106)
CHLORIDE SERPL-SCNC: 94 MMOL/L (ref 98–107)
CHOLESTEROL FLUID: 43 MG/DL
CO2 SERPL-SCNC: 37.1 MMOL/L (ref 22–29)
COHGB MFR BLD: 0.6 % (ref 0–1.5)
COLOR FLD: ABNORMAL
CREAT SERPL-MCNC: 2.45 MG/DL (ref 0.76–1.27)
D-LACTATE SERPL-SCNC: 0.4 MMOL/L (ref 0.5–2)
DEPRECATED RDW RBC AUTO: 49.8 FL (ref 37–54)
EGFRCR SERPLBLD CKD-EPI 2021: 29.8 ML/MIN/1.73
EOSINOPHIL # BLD AUTO: 0.36 10*3/MM3 (ref 0–0.4)
EOSINOPHIL NFR BLD AUTO: 2.3 % (ref 0.3–6.2)
EOSINOPHIL NFR FLD MANUAL: 1 %
ERYTHROCYTE [DISTWIDTH] IN BLOOD BY AUTOMATED COUNT: 14.7 % (ref 12.3–15.4)
FHHB: 7.8 % (ref 0–5)
GLUCOSE BLDA-MCNC: 128 MG/DL (ref 70–99)
GLUCOSE BLDC GLUCOMTR-MCNC: 135 MG/DL (ref 70–99)
GLUCOSE BLDC GLUCOMTR-MCNC: 195 MG/DL (ref 70–99)
GLUCOSE BLDC GLUCOMTR-MCNC: 209 MG/DL (ref 70–99)
GLUCOSE BLDC GLUCOMTR-MCNC: 234 MG/DL (ref 70–99)
GLUCOSE FLD-MCNC: 155 MG/DL
GLUCOSE SERPL-MCNC: 157 MG/DL (ref 65–99)
GRAM STN SPEC: ABNORMAL
HCO3 BLDA-SCNC: 33.1 MMOL/L (ref 22–26)
HCT VFR BLD AUTO: 26.6 % (ref 37.5–51)
HGB BLD-MCNC: 7.8 G/DL (ref 13–17.7)
HGB BLDA-MCNC: 8.9 G/DL (ref 13.8–16.4)
IMM GRANULOCYTES # BLD AUTO: 0.39 10*3/MM3 (ref 0–0.05)
IMM GRANULOCYTES NFR BLD AUTO: 2.5 % (ref 0–0.5)
INHALED O2 CONCENTRATION: 40 %
LACTATE BLDA-SCNC: 0.69 MMOL/L (ref 0.5–2)
LDH FLD-CCNC: 142 U/L
LYMPHOCYTES # BLD AUTO: 1.58 10*3/MM3 (ref 0.7–3.1)
LYMPHOCYTES NFR BLD AUTO: 10.3 % (ref 19.6–45.3)
LYMPHOCYTES NFR FLD MANUAL: 37 %
MACROPHAGE FLUID: 16 %
MCH RBC QN AUTO: 27.3 PG (ref 26.6–33)
MCHC RBC AUTO-ENTMCNC: 29.3 G/DL (ref 31.5–35.7)
MCV RBC AUTO: 93 FL (ref 79–97)
METHGB BLD QL: 0.5 % (ref 0–1.5)
MODALITY: ABNORMAL
MONOCYTES # BLD AUTO: 1.84 10*3/MM3 (ref 0.1–0.9)
MONOCYTES NFR BLD AUTO: 12 % (ref 5–12)
MONOCYTES NFR FLD: 1 %
MRSA DNA SPEC QL NAA+PROBE: NORMAL
NEUTROPHILS NFR BLD AUTO: 11.14 10*3/MM3 (ref 1.7–7)
NEUTROPHILS NFR BLD AUTO: 72.5 % (ref 42.7–76)
NEUTROPHILS NFR FLD MANUAL: 45 %
NOTE: ABNORMAL
NRBC BLD AUTO-RTO: 0 /100 WBC (ref 0–0.2)
NT-PROBNP SERPL-MCNC: 1366 PG/ML (ref 0–900)
NUC CELL # FLD: 1309 /MM3
OXYHGB MFR BLDV: 91.1 % (ref 94–99)
PCO2 BLDA: 77.5 MM HG (ref 35–45)
PH BLDA: 7.25 PH UNITS (ref 7.35–7.45)
PH FLD: 7.5 [PH]
PLATELET # BLD AUTO: 293 10*3/MM3 (ref 140–450)
PMV BLD AUTO: 8.6 FL (ref 6–12)
PO2 BLD: 177 MM[HG] (ref 0–500)
PO2 BLDA: 70.7 MM HG (ref 80–100)
POTASSIUM BLDA-SCNC: 4.55 MMOL/L (ref 3.5–5)
POTASSIUM SERPL-SCNC: 4.7 MMOL/L (ref 3.5–5.2)
PROCALCITONIN SERPL-MCNC: 0.22 NG/ML (ref 0–0.25)
PROT FLD-MCNC: 3.1 G/DL
RBC # BLD AUTO: 2.86 10*6/MM3 (ref 4.14–5.8)
RBC # FLD AUTO: ABNORMAL /MM3
SAO2 % BLDCOA: 92.1 % (ref 95–99)
SODIUM BLDA-SCNC: 133.8 MMOL/L (ref 136–146)
SODIUM SERPL-SCNC: 138 MMOL/L (ref 136–145)
TRIGL FLD-MCNC: 19 MG/DL
VANCOMYCIN SERPL-MCNC: 21.14 MCG/ML (ref 5–40)
WBC NRBC COR # BLD AUTO: 15.37 10*3/MM3 (ref 3.4–10.8)

## 2024-04-29 PROCEDURE — 94799 UNLISTED PULMONARY SVC/PX: CPT

## 2024-04-29 PROCEDURE — 99291 CRITICAL CARE FIRST HOUR: CPT | Performed by: INTERNAL MEDICINE

## 2024-04-29 PROCEDURE — 80202 ASSAY OF VANCOMYCIN: CPT | Performed by: FAMILY MEDICINE

## 2024-04-29 PROCEDURE — 83986 ASSAY PH BODY FLUID NOS: CPT | Performed by: INTERNAL MEDICINE

## 2024-04-29 PROCEDURE — 32555 ASPIRATE PLEURA W/ IMAGING: CPT | Performed by: INTERNAL MEDICINE

## 2024-04-29 PROCEDURE — 0W993ZZ DRAINAGE OF RIGHT PLEURAL CAVITY, PERCUTANEOUS APPROACH: ICD-10-PCS | Performed by: INTERNAL MEDICINE

## 2024-04-29 PROCEDURE — 82945 GLUCOSE OTHER FLUID: CPT | Performed by: INTERNAL MEDICINE

## 2024-04-29 PROCEDURE — 87186 SC STD MICRODIL/AGAR DIL: CPT | Performed by: FAMILY MEDICINE

## 2024-04-29 PROCEDURE — 87641 MR-STAPH DNA AMP PROBE: CPT | Performed by: FAMILY MEDICINE

## 2024-04-29 PROCEDURE — 83880 ASSAY OF NATRIURETIC PEPTIDE: CPT | Performed by: INTERNAL MEDICINE

## 2024-04-29 PROCEDURE — 87015 SPECIMEN INFECT AGNT CONCNTJ: CPT | Performed by: INTERNAL MEDICINE

## 2024-04-29 PROCEDURE — 94660 CPAP INITIATION&MGMT: CPT

## 2024-04-29 PROCEDURE — 25010000002 PIPERACILLIN SOD-TAZOBACTAM PER 1 G: Performed by: FAMILY MEDICINE

## 2024-04-29 PROCEDURE — 88108 CYTOPATH CONCENTRATE TECH: CPT | Performed by: INTERNAL MEDICINE

## 2024-04-29 PROCEDURE — 94640 AIRWAY INHALATION TREATMENT: CPT

## 2024-04-29 PROCEDURE — 87205 SMEAR GRAM STAIN: CPT | Performed by: FAMILY MEDICINE

## 2024-04-29 PROCEDURE — 84311 SPECTROPHOTOMETRY: CPT | Performed by: INTERNAL MEDICINE

## 2024-04-29 PROCEDURE — 71045 X-RAY EXAM CHEST 1 VIEW: CPT

## 2024-04-29 PROCEDURE — 87186 SC STD MICRODIL/AGAR DIL: CPT

## 2024-04-29 PROCEDURE — 85025 COMPLETE CBC W/AUTO DIFF WBC: CPT | Performed by: FAMILY MEDICINE

## 2024-04-29 PROCEDURE — 82948 REAGENT STRIP/BLOOD GLUCOSE: CPT

## 2024-04-29 PROCEDURE — 83615 LACTATE (LD) (LDH) ENZYME: CPT | Performed by: INTERNAL MEDICINE

## 2024-04-29 PROCEDURE — 25810000003 SODIUM CHLORIDE 0.9 % SOLUTION: Performed by: FAMILY MEDICINE

## 2024-04-29 PROCEDURE — 87077 CULTURE AEROBIC IDENTIFY: CPT | Performed by: FAMILY MEDICINE

## 2024-04-29 PROCEDURE — 25010000002 BUMETANIDE PER 0.5 MG: Performed by: INTERNAL MEDICINE

## 2024-04-29 PROCEDURE — 89051 BODY FLUID CELL COUNT: CPT | Performed by: INTERNAL MEDICINE

## 2024-04-29 PROCEDURE — 94761 N-INVAS EAR/PLS OXIMETRY MLT: CPT

## 2024-04-29 PROCEDURE — 87070 CULTURE OTHR SPECIMN AEROBIC: CPT | Performed by: INTERNAL MEDICINE

## 2024-04-29 PROCEDURE — 83050 HGB METHEMOGLOBIN QUAN: CPT | Performed by: INTERNAL MEDICINE

## 2024-04-29 PROCEDURE — 63710000001 INSULIN LISPRO (HUMAN) PER 5 UNITS: Performed by: INTERNAL MEDICINE

## 2024-04-29 PROCEDURE — 82375 ASSAY CARBOXYHB QUANT: CPT | Performed by: INTERNAL MEDICINE

## 2024-04-29 PROCEDURE — 87205 SMEAR GRAM STAIN: CPT | Performed by: INTERNAL MEDICINE

## 2024-04-29 PROCEDURE — 84478 ASSAY OF TRIGLYCERIDES: CPT | Performed by: INTERNAL MEDICINE

## 2024-04-29 PROCEDURE — 87102 FUNGUS ISOLATION CULTURE: CPT | Performed by: INTERNAL MEDICINE

## 2024-04-29 PROCEDURE — 84157 ASSAY OF PROTEIN OTHER: CPT | Performed by: INTERNAL MEDICINE

## 2024-04-29 PROCEDURE — 82042 OTHER SOURCE ALBUMIN QUAN EA: CPT | Performed by: INTERNAL MEDICINE

## 2024-04-29 PROCEDURE — 84145 PROCALCITONIN (PCT): CPT | Performed by: FAMILY MEDICINE

## 2024-04-29 PROCEDURE — 36600 WITHDRAWAL OF ARTERIAL BLOOD: CPT | Performed by: INTERNAL MEDICINE

## 2024-04-29 PROCEDURE — 80048 BASIC METABOLIC PNL TOTAL CA: CPT | Performed by: FAMILY MEDICINE

## 2024-04-29 PROCEDURE — 25010000002 PIPERACILLIN SOD-TAZOBACTAM PER 1 G: Performed by: INTERNAL MEDICINE

## 2024-04-29 PROCEDURE — 63710000001 INSULIN DETEMIR PER 5 UNITS: Performed by: INTERNAL MEDICINE

## 2024-04-29 PROCEDURE — 87040 BLOOD CULTURE FOR BACTERIA: CPT | Performed by: FAMILY MEDICINE

## 2024-04-29 PROCEDURE — 87147 CULTURE TYPE IMMUNOLOGIC: CPT | Performed by: FAMILY MEDICINE

## 2024-04-29 PROCEDURE — 87116 MYCOBACTERIA CULTURE: CPT | Performed by: INTERNAL MEDICINE

## 2024-04-29 PROCEDURE — 25010000002 HEPARIN (PORCINE) PER 1000 UNITS: Performed by: FAMILY MEDICINE

## 2024-04-29 PROCEDURE — 87206 SMEAR FLUORESCENT/ACID STAI: CPT | Performed by: INTERNAL MEDICINE

## 2024-04-29 PROCEDURE — 82009 KETONE BODYS QUAL: CPT | Performed by: FAMILY MEDICINE

## 2024-04-29 PROCEDURE — 99233 SBSQ HOSP IP/OBS HIGH 50: CPT | Performed by: INTERNAL MEDICINE

## 2024-04-29 PROCEDURE — 83605 ASSAY OF LACTIC ACID: CPT | Performed by: FAMILY MEDICINE

## 2024-04-29 PROCEDURE — 87075 CULTR BACTERIA EXCEPT BLOOD: CPT | Performed by: INTERNAL MEDICINE

## 2024-04-29 PROCEDURE — 94645 CONT INHLJ TX EACH ADDL HOUR: CPT

## 2024-04-29 PROCEDURE — 82805 BLOOD GASES W/O2 SATURATION: CPT | Performed by: INTERNAL MEDICINE

## 2024-04-29 PROCEDURE — 87070 CULTURE OTHR SPECIMN AEROBIC: CPT | Performed by: FAMILY MEDICINE

## 2024-04-29 RX ORDER — ARFORMOTEROL TARTRATE 15 UG/2ML
15 SOLUTION RESPIRATORY (INHALATION)
Status: DISCONTINUED | OUTPATIENT
Start: 2024-04-29 | End: 2024-05-03 | Stop reason: HOSPADM

## 2024-04-29 RX ORDER — FAMOTIDINE 20 MG/1
40 TABLET, FILM COATED ORAL DAILY
Status: DISCONTINUED | OUTPATIENT
Start: 2024-04-29 | End: 2024-05-03 | Stop reason: HOSPADM

## 2024-04-29 RX ORDER — INSULIN LISPRO 100 [IU]/ML
3-14 INJECTION, SOLUTION INTRAVENOUS; SUBCUTANEOUS
Status: DISCONTINUED | OUTPATIENT
Start: 2024-04-29 | End: 2024-05-03 | Stop reason: HOSPADM

## 2024-04-29 RX ORDER — METOLAZONE 5 MG/1
5 TABLET ORAL ONCE
Status: COMPLETED | OUTPATIENT
Start: 2024-04-29 | End: 2024-04-29

## 2024-04-29 RX ORDER — CARVEDILOL 25 MG/1
25 TABLET ORAL EVERY 12 HOURS SCHEDULED
Status: DISCONTINUED | OUTPATIENT
Start: 2024-04-29 | End: 2024-05-03 | Stop reason: HOSPADM

## 2024-04-29 RX ORDER — IBUPROFEN 600 MG/1
1 TABLET ORAL
Status: DISCONTINUED | OUTPATIENT
Start: 2024-04-29 | End: 2024-05-03 | Stop reason: HOSPADM

## 2024-04-29 RX ORDER — FERROUS SULFATE 325(65) MG
325 TABLET ORAL
Status: DISCONTINUED | OUTPATIENT
Start: 2024-04-29 | End: 2024-05-03 | Stop reason: HOSPADM

## 2024-04-29 RX ORDER — MONTELUKAST SODIUM 10 MG/1
10 TABLET ORAL NIGHTLY
Status: DISCONTINUED | OUTPATIENT
Start: 2024-04-29 | End: 2024-05-03 | Stop reason: HOSPADM

## 2024-04-29 RX ORDER — ATORVASTATIN CALCIUM 20 MG/1
20 TABLET, FILM COATED ORAL NIGHTLY
Status: DISCONTINUED | OUTPATIENT
Start: 2024-04-29 | End: 2024-05-03 | Stop reason: HOSPADM

## 2024-04-29 RX ORDER — BUMETANIDE 0.25 MG/ML
2 INJECTION INTRAMUSCULAR; INTRAVENOUS 2 TIMES DAILY
Status: DISCONTINUED | OUTPATIENT
Start: 2024-04-29 | End: 2024-04-30

## 2024-04-29 RX ORDER — ALBUTEROL SULFATE 2.5 MG/3ML
2.5 SOLUTION RESPIRATORY (INHALATION)
Status: DISCONTINUED | OUTPATIENT
Start: 2024-04-29 | End: 2024-05-01

## 2024-04-29 RX ORDER — FOLIC ACID 1 MG/1
1 TABLET ORAL NIGHTLY
Status: DISCONTINUED | OUTPATIENT
Start: 2024-04-29 | End: 2024-05-03 | Stop reason: HOSPADM

## 2024-04-29 RX ORDER — ASPIRIN 81 MG/1
81 TABLET ORAL DAILY
Status: DISCONTINUED | OUTPATIENT
Start: 2024-04-29 | End: 2024-05-03 | Stop reason: HOSPADM

## 2024-04-29 RX ORDER — BUDESONIDE 0.5 MG/2ML
0.5 INHALANT ORAL
Status: DISCONTINUED | OUTPATIENT
Start: 2024-04-29 | End: 2024-05-03 | Stop reason: HOSPADM

## 2024-04-29 RX ORDER — NICOTINE POLACRILEX 4 MG
15 LOZENGE BUCCAL
Status: DISCONTINUED | OUTPATIENT
Start: 2024-04-29 | End: 2024-05-03 | Stop reason: HOSPADM

## 2024-04-29 RX ORDER — TOBRAMYCIN INHALATION SOLUTION 300 MG/5ML
300 INHALANT RESPIRATORY (INHALATION)
Status: DISCONTINUED | OUTPATIENT
Start: 2024-04-29 | End: 2024-05-03 | Stop reason: HOSPADM

## 2024-04-29 RX ORDER — LEVETIRACETAM 500 MG/1
500 TABLET ORAL 2 TIMES DAILY
Status: DISCONTINUED | OUTPATIENT
Start: 2024-04-29 | End: 2024-05-03 | Stop reason: HOSPADM

## 2024-04-29 RX ORDER — MELATONIN
2000 DAILY
Status: DISCONTINUED | OUTPATIENT
Start: 2024-04-29 | End: 2024-05-03 | Stop reason: HOSPADM

## 2024-04-29 RX ORDER — FINASTERIDE 5 MG/1
5 TABLET, FILM COATED ORAL NIGHTLY
Status: DISCONTINUED | OUTPATIENT
Start: 2024-04-29 | End: 2024-05-03 | Stop reason: HOSPADM

## 2024-04-29 RX ORDER — DEXTROSE MONOHYDRATE 25 G/50ML
25 INJECTION, SOLUTION INTRAVENOUS
Status: DISCONTINUED | OUTPATIENT
Start: 2024-04-29 | End: 2024-05-03 | Stop reason: HOSPADM

## 2024-04-29 RX ADMIN — INSULIN LISPRO 3 UNITS: 100 INJECTION, SOLUTION INTRAVENOUS; SUBCUTANEOUS at 20:53

## 2024-04-29 RX ADMIN — ALBUTEROL SULFATE 2.5 MG: 2.5 SOLUTION RESPIRATORY (INHALATION) at 09:23

## 2024-04-29 RX ADMIN — ARFORMOTEROL TARTRATE 15 MCG: 15 SOLUTION RESPIRATORY (INHALATION) at 19:27

## 2024-04-29 RX ADMIN — METOLAZONE 5 MG: 5 TABLET ORAL at 09:16

## 2024-04-29 RX ADMIN — BUDESONIDE 0.5 MG: 0.5 INHALANT RESPIRATORY (INHALATION) at 19:27

## 2024-04-29 RX ADMIN — TOBRAMYCIN 300 MG: 300 SOLUTION RESPIRATORY (INHALATION) at 23:35

## 2024-04-29 RX ADMIN — APIXABAN 5 MG: 5 TABLET, FILM COATED ORAL at 12:10

## 2024-04-29 RX ADMIN — PIPERACILLIN SODIUM AND TAZOBACTAM SODIUM 3.38 G: 3; .375 INJECTION, POWDER, LYOPHILIZED, FOR SOLUTION INTRAVENOUS at 23:10

## 2024-04-29 RX ADMIN — INSULIN DETEMIR 10 UNITS: 100 INJECTION, SOLUTION SUBCUTANEOUS at 20:53

## 2024-04-29 RX ADMIN — HEPARIN SODIUM 5000 UNITS: 5000 INJECTION INTRAVENOUS; SUBCUTANEOUS at 09:15

## 2024-04-29 RX ADMIN — ALBUTEROL SULFATE 2.5 MG: 2.5 SOLUTION RESPIRATORY (INHALATION) at 19:27

## 2024-04-29 RX ADMIN — APIXABAN 5 MG: 5 TABLET, FILM COATED ORAL at 23:10

## 2024-04-29 RX ADMIN — SODIUM CHLORIDE 75 ML/HR: 9 INJECTION, SOLUTION INTRAVENOUS at 01:17

## 2024-04-29 RX ADMIN — BUMETANIDE 2 MG: 0.25 INJECTION INTRAMUSCULAR; INTRAVENOUS at 09:15

## 2024-04-29 RX ADMIN — Medication 1 MG: at 20:47

## 2024-04-29 RX ADMIN — FINASTERIDE 5 MG: 5 TABLET, FILM COATED ORAL at 20:47

## 2024-04-29 RX ADMIN — Medication 10 ML: at 09:16

## 2024-04-29 RX ADMIN — INSULIN LISPRO 5 UNITS: 100 INJECTION, SOLUTION INTRAVENOUS; SUBCUTANEOUS at 14:35

## 2024-04-29 RX ADMIN — TOBRAMYCIN 300 MG: 300 SOLUTION RESPIRATORY (INHALATION) at 11:41

## 2024-04-29 RX ADMIN — Medication 2000 UNITS: at 12:09

## 2024-04-29 RX ADMIN — CARVEDILOL 25 MG: 25 TABLET, FILM COATED ORAL at 20:48

## 2024-04-29 RX ADMIN — ARFORMOTEROL TARTRATE 15 MCG: 15 SOLUTION RESPIRATORY (INHALATION) at 09:23

## 2024-04-29 RX ADMIN — PIPERACILLIN SODIUM AND TAZOBACTAM SODIUM 3.38 G: 3; .375 INJECTION, POWDER, LYOPHILIZED, FOR SOLUTION INTRAVENOUS at 14:35

## 2024-04-29 RX ADMIN — ASPIRIN 81 MG: 81 TABLET, COATED ORAL at 12:09

## 2024-04-29 RX ADMIN — ATORVASTATIN CALCIUM 20 MG: 20 TABLET, FILM COATED ORAL at 20:47

## 2024-04-29 RX ADMIN — FAMOTIDINE 40 MG: 20 TABLET ORAL at 12:11

## 2024-04-29 RX ADMIN — HEPARIN SODIUM 5000 UNITS: 5000 INJECTION INTRAVENOUS; SUBCUTANEOUS at 01:17

## 2024-04-29 RX ADMIN — LEVETIRACETAM 500 MG: 500 TABLET, FILM COATED ORAL at 20:47

## 2024-04-29 RX ADMIN — ALBUTEROL SULFATE 2.5 MG: 2.5 SOLUTION RESPIRATORY (INHALATION) at 23:34

## 2024-04-29 RX ADMIN — BUDESONIDE 0.5 MG: 0.5 INHALANT RESPIRATORY (INHALATION) at 09:23

## 2024-04-29 RX ADMIN — LEVETIRACETAM 500 MG: 500 TABLET, FILM COATED ORAL at 12:10

## 2024-04-29 RX ADMIN — MONTELUKAST 10 MG: 10 TABLET, FILM COATED ORAL at 20:48

## 2024-04-29 RX ADMIN — CARVEDILOL 25 MG: 25 TABLET, FILM COATED ORAL at 12:10

## 2024-04-29 RX ADMIN — INSULIN LISPRO 5 UNITS: 100 INJECTION, SOLUTION INTRAVENOUS; SUBCUTANEOUS at 17:05

## 2024-04-29 RX ADMIN — BUMETANIDE 2 MG: 0.25 INJECTION INTRAMUSCULAR; INTRAVENOUS at 23:09

## 2024-04-29 RX ADMIN — Medication 10 ML: at 23:10

## 2024-04-29 RX ADMIN — FERROUS SULFATE TAB 325 MG (65 MG ELEMENTAL FE) 325 MG: 325 (65 FE) TAB at 12:11

## 2024-04-29 RX ADMIN — ALBUTEROL SULFATE 2.5 MG: 2.5 SOLUTION RESPIRATORY (INHALATION) at 11:41

## 2024-04-29 RX ADMIN — PIPERACILLIN SODIUM AND TAZOBACTAM SODIUM 3.38 G: 3; .375 INJECTION, POWDER, LYOPHILIZED, FOR SOLUTION INTRAVENOUS at 01:29

## 2024-04-29 RX ADMIN — PIPERACILLIN SODIUM AND TAZOBACTAM SODIUM 3.38 G: 3; .375 INJECTION, POWDER, LYOPHILIZED, FOR SOLUTION INTRAVENOUS at 06:02

## 2024-04-29 RX ADMIN — ALBUTEROL SULFATE 2.5 MG: 2.5 SOLUTION RESPIRATORY (INHALATION) at 15:17

## 2024-04-29 NOTE — THERAPY EVALUATION
Respiratory Therapist Broncho-Pulmonary Hygiene Progress Note      Patient Name:  Preston Wallis  YOB: 1965    Preston Wallis meets the qualification for Level 1 of the Bronco-Pulmonary Hygiene Protocol. This was based on my daily patient assessment and includes review of chest x-ray results, cough ability and quality, oxygenation, secretions or risk for secretion development and patient mobility.     Broncho-Pulmonary Hygiene Assessment:    Level of Movement: Actively changing positions-requires assistance  Disoriented/Follows Commands    Breath Sounds: Diminished and/or coarse rhonchi    Cough: Strong, effective    Chest X-Ray: Possible signs of consolidation and/or atelectasis or clear.     Sputum Productions: None or small amount of thin or watery secretions with effective cough    History and Physical: Chronic condition    SpO2 to Oxygen Need: greater than 90% on  greater than 6L, Vapotherm, oxygen mask greater than 40% or ventilator    Current SpO2 is: 94 on 35% bipap    Based on this information, I have completed the following interventions: Aerobika with bronchodialtor medication or TID      Electronically signed by Rahat Garrett RRT, 04/29/24, 9:27 AM EDT.

## 2024-04-29 NOTE — PROCEDURES
Procedure name: ultrasound-guided right-sided thoracentesis     Indication - pleural effusion     This procedural risks were explained to the patient including pneumothorax requiring chest tube placement, significant bleeding requiring surgery, and infection , understanding of the risks and benefits acknowledged by the patient and family and he wish to proceed with the procedure.     Timeout performed     Procedure details  Ultrasound was use to visualize a  collection of pleural fluid, the diaphragm, and collapsed lung. At this point, the skin overlying the rib was anesthetized with 5 mL 1% lidocaine without epinephrine. A thoracentesis needle was then inserted into the pleural cavity just over top of the rib and pleural fluid was aspirated back. At this time the thoracentesis catheter was advanced to the pleural cavity over the needle.  Approximately 1000mL of cloudy reddish fluid was removed. Pleural fluid was sent for analysis. Chest x-ray shows reexpansion of lung without pneumothorax.      Patient tolerated procedure well     No immediate complications    Electronically signed by Severiano Carolina MD, 04/29/24, 10:51 AM EDT.

## 2024-04-29 NOTE — PROGRESS NOTES
[facility]   Hospitalist Progress Note  Date: 2024  Patient Name: Preston Wallis  : 1965  MRN: [id number]  Date of admission: 2024      Subjective   Subjective     Chief Complaint: Lethargy, increased blood sugar and shortness of air    Summary:   Direct transfer from [facility]  ED.  Patient presented to outside ED due to lethargy and increased blood glucose of 515.  Although lethargic but  was responding appropriately.  Patient has past medical history of hypercapnic and hypoxemic respiratory failure, obstructive sleep apnea, A-fib, seizure disorder, CKD 3B, diabetes mellitus, anemia, recent CABG, BPH with self cath, and diabetic foot ulcers.  Patient was recently discharged on  from our hospital because of Pseudomonas pneumonia on Levaquin.  Pleural fluid  from thoracentesis was transudative.  Workup at the outside ED showed O2 sat to be 76% on 2 L.  Other vital signs stable patient is afebrile.  Initial ABG showed pH of 7.19 pCO2 of 103 and pO2 45.  With BiPAP 40% FiO2 his ABG showed pH of 7.25 pCO2 91 and pO2 of 62.  Patient is more alert, sitting and talking.  Chest x-ray showed worsening of right more than left effusion with possible right lower lobe pneumonia.  His kidney function has gotten worse with a creatinine of 2.4.  Recent creatinine was 1.7.  White cell count is 15,000.  Repeat blood sugar is better.  Patient given IV Vanco and cefepime at the outside facility and hospitalist has been called to admit him here.  Intensivist was also consulted by outside facility for ICU admission.  Patient had right thoracentesis about 1 L reddish tinged fluid on .  Patient Pseudomonas on some sputum culture was not sensitive to Levaquin.    Interval Followup:   Vital signs stable on 28 % FiO2 with BiPAP 18/8 with rate of 24 tolerated well overnight with improvement in ABG.  Now on nasal cannula during my rounds.  Awake alert oriented x 3.  Does have cough productive of green  mucus.  No chest pain.  Patient has not been compliant with his home astral as per RT case management evaluation.    Review of Systems   All systems were reviewed and negative except for: Summary and interval follow-up    Objective   Objective     Vitals:   Temp:  [96.3 °F (35.7 °C)-99.3 °F (37.4 °C)] 99 °F (37.2 °C)  Heart Rate:  [74-86] 76  Resp:  [14-27] 17  BP: ()/() 135/60  Flow (L/min):  [2] 2  Physical Exam    Constitutional: Awake, alert, no acute distress   Eyes: Pupils equal, sclerae anicteric, no conjunctival injection   HENT: NCAT, mucous membranes moist   Neck: Supple, no thyromegaly, no lymphadenopathy, trachea midline   Respiratory: Rhonchi and decreased to auscultation bilaterally, nonlabored respirations    Cardiovascular: RRR, no murmurs, rubs, or gallops, palpable pedal pulses bilaterally   Gastrointestinal: Positive bowel sounds, soft, nontender, nondistended   Musculoskeletal: +2 bilateral ankle edema, no clubbing or cyanosis to extremities   Psychiatric: Appropriate affect, cooperative   Neurologic: Oriented x 3, strength symmetric in all extremities, Cranial Nerves grossly intact to confrontation, speech clear   Skin: No rashes     Result Review    Result Review:  I have personally reviewed the results for the past 24 hours and agree with these findings:  [x]  Laboratory  [x]  Microbiology  [x]  Radiology  []  EKG/Telemetry   []  Cardiology/Vascular   []  Pathology  [x]  Old records  [x]  Other: Medications    Assessment & Plan   Assessment / Plan     Assessment:  Healthcare associated pneumonia likely Pseudomonas right lower lobe.  Right parapneumonic effusion.  S/p thoracentesis 1 L additional fluid drained April 29 by intensivist.  Acute on chronic hypoxemic hypercapnic respiratory failure..  Improving.  Obstructive sleep apnea on home astral.  Noncompliance with home NIPPV.  History of bronchiectasis.  Acute on chronic diastolic CHF.  Right more than left pleural effusion  CKD  3B baseline creatinine of 2.1.  Creatinine slightly worse.  S/p port placement.  Chronic anemia stable.  Diabetes mellitus.  Diabetic foot wound.  Paroxysmal A-fib on Eliquis.       Plan:  Continue supplemental oxygen keep sats more than 90%  NIPPV as needed and with naps.  Monitor ABG.  Patient to bring his home astral  IV Zosyn.  Sputum culture pending  IV Bumex pain  Nebulizer treatment  Bronchodilator and bronchopulmonary team protocol.  Chest vest therapy, incentive spirometry, MetaNeb and flutter valve.  Check CT of the chest.  Appreciate pulmonary critical input.  S/p thoracentesis.  Await culture data.  Appreciate RT case management.  Sliding-scale insulin.  Continue home Keppra, Lipitor, Coreg and aspirin.  Carb consistent heart healthy diet.  MRSA PCR negative.  Continue Doyle catheter.  PT OT.  Transfer out of unit to monitored bed  Discussed plan with RN.    DVT prophylaxis:  Medical DVT prophylaxis orders are present.        CODE STATUS:   Code Status (Patient has no pulse and is not breathing): CPR (Attempt to Resuscitate)  Medical Interventions (Patient has pulse or is breathing): Full Support      Part of this note may be an electronic transcription/translation of spoken language to printed text using the Dragon Dictation System.     Electronically signed by Gatito Higginbotham MD, 04/29/24, 6:21 PM EDT.

## 2024-04-29 NOTE — PROCEDURES
Bedside thoracic ultrasound:     Left side: Spleen, left hemidiaphragm, left lower lobe of lung identified.  Tiny pleural effusion identified.  Images saved  Right side: Liver, right hemidiaphragm, right lower lobe of lung identified.  Moderate pleural effusion identified.  Images saved     Site marked for thoracentesis.        CPT 92813 with thoracentesis note    Electronically signed by Severiano Carolina MD, 04/29/24, 10:50 AM EDT.

## 2024-04-29 NOTE — TELEPHONE ENCOUNTER
Caller: Kami Wallis    Relationship to patient: Emergency Contact    Best call back number: [phone]    Patient is needing: PATIENT IS IN THE HOSPITAL WITH A POSSIBLE LUNG INFECTION

## 2024-04-29 NOTE — PROGRESS NOTES
[facility]: Vancomycin Pharmacokinetic Initial Consult Note    Preston Wallis is a 58 y.o. male who is on day 1 of pharmacy to dose vancomycin for Pneumonia and Sepsis.    Consult Information  Consulting Provider: Nalini  Planned Duration of Therapy: 7 days  Was Patient Receiving Prior to Admission/Consult?: Yes, last dose received 2000 at 4/28 2023.at [facility].  Loading Dose Ordered or Given: see above  PK/PD Target: -600 mg/L.hr   Relevant ID History: Direct admit from [facility] 4/28, cefepime 2 g 4/28 1824, multiple admissions past 90 days with antibiotics  Other Antimicrobials: Piperacillin/Tazobactam    Imaging Reviewed?: Yes    Microbiology Data  MRSA PCR performed: 04/29/24; Result: Pending  Culture/Source:   4/29 BCx2 IP  4/29 Resp IP  4/16 Resp still prelim, might need to call lab for final, growing Heavy pseudomonas spp.    Vitals/Labs  Ht: 175.3 cm  ; Wt: 116 kg    No data recorded.   Estimated Creatinine Clearance: 44.7 mL/min (A) (by C-G formula based on SCr of 2.26 mg/dL (H)).  Renal: no issues at time of note, CKD stage 3b     Results from last 7 days   Lab Units 04/29/24  0016   WBC 10*3/mm3 15.37*     Assessment/Plan:    Vancomycin Dose: pulse dosing with 2000 mg IV once on 4/28 at 2023 ; which provides the following predicted parameters:  SCr 2.47, wt 118 kg at [facility], gives approx CrCl 38 ml/min  Will order random for clinical, adjust as appropriate  Vanc Random ordered for 4/29 at 0800  Patient has order for Basic Metabolic Panel x3 days    Pharmacy will follow patient's kidney function and will adjust doses and obtain levels as necessary. Thank you for involving pharmacy in this patient's care. Please contact pharmacy with any questions or concerns.                           Deshawn Alexandre, PharmD  Clinical Pharmacist

## 2024-04-29 NOTE — OUTREACH NOTE
AMBULATORY CASE MANAGEMENT NOTE    Names and Relationships of Patient/Support Persons: Caller: Kami Wallis; Relationship: Emergency Contact -     Kami called to report that Gómez is back in the hospital, respiratory failure.    He is not using his Cpap at home.  His glucose was 500+, she states that he takes 10 units of long acting bid and sliding scale on the other.  Will continue to monitor.     Tara R  Ambulatory Case Management    4/29/2024, 08:36 EDT  [facility] End of Month Documentation    This Chronic Medical Management Care Plan for Preston Wallis, 58 y.o. male, has been established; monitored and managed; reviewed and a new plan of care implemented for the month of April.  A cumulative time of 97  minutes was spent on this patient record this month, including phone call with care giver; electronic communication with primary care provider; electronic communication with pharmacist; chart review; phone call with patient.    Regarding the patient's problems: has Pneumonia due to COVID-19 virus; Polyneuropathy; Paroxysmal atrial fibrillation; Obstructive sleep apnea; MRSA pneumonia; Low back pain; Chronic diastolic heart failure; Allergies; COPD exacerbation; Chronic anticoagulation; Benign prostatic hyperplasia; Impaired mobility and endurance; Stage 3a chronic kidney disease; Iron deficiency anemia secondary to inadequate dietary iron intake; Vitamin D deficiency; Class 3 severe obesity with serious comorbidity in adult; Lower extremity edema; Elevated alkaline phosphatase level; Venous insufficiency (chronic) (peripheral); Tobacco abuse, in remission; History of Pseudomonas pneumonia; Chronic dyspnea; Gastroesophageal reflux disease; Bronchiectasis without complication; ERIC (acute kidney injury); Altered mental status; Hyperlipidemia; Luetscher's syndrome; Neurogenic bladder; Class 1 obesity; Pneumonia of both lungs due to Pseudomonas species; Seizures; Primary osteoarthritis of left knee; Other constipation;  Chronic obstructive pulmonary disease; Type 2 DM with CKD stage 3 and hypertension; Essential hypertension; Stage 3b chronic kidney disease; Annual physical exam; Long-term use of high-risk medication; Personal history of PE (pulmonary embolism); Encounter for aftercare for healing closed traumatic fracture of left femur; Chronic pain of left knee; Primary osteoarthritis of right knee; Sepsis; Bronchiectasis with acute exacerbation; Bacterial pneumonia; Anemia; Closed fracture of left tibial plateau; Septic joint; Septic arthritis; Skin ulcer of left heel, limited to breakdown of skin; Ulcer of left foot, limited to breakdown of skin; Left foot pain; Wheezing; Acute on chronic respiratory failure with hypercapnia; Chronic respiratory failure with hypoxia and hypercapnia; and Acute hypoxic on chronic hypercapnic respiratory failure on their problem list., the following items were addressed: medications; transitions to medical care; medical records; referrals to community service providers and any changes can be found within the plan section of the note.  A detailed listing of time spent for chronic care management is tracked within each outreach encounter.  Current medications include:  has a current medication list which includes the following prescription(s): apixaban, aspirin, atorvastatin, breztri aerosphere, bumetanide, buspirone, calcium citrate, carvedilol, vitamin d3, farxiga, docusate sodium, duloxetine, famotidine, ferrous gluconate, finasteride, folic acid, levemir, insulin lispro, ipratropium-albuterol, levetiracetam, levofloxacin, magnesium oxide, montelukast, multivitamin with iron, nifedipine xl, o2, trazodone, ventolin hfa, and vitamin c, and the following Facility-Administered Medications: albuterol, arformoterol, sennosides-docusate **AND** polyethylene glycol **AND** bisacodyl **AND** bisacodyl, budesonide, bumetanide, dextrose, dextrose, glucagon, heparin (porcine), metolazone, nitroglycerin,  piperacillin sod-tazobactam so, piperacillin-tazobactam, sodium chloride, sodium chloride, sodium chloride, sodium chloride, sodium chloride, sodium chloride. and the patient is reported to be caregiver will take responsibility for med compliance; patient is compliant with medication protocol,  Medications are reported to be non-effective in controlling symptoms and changes have been made to the medication protocol.  Regarding these diagnoses, referrals were made to the following provider(s):  specialists.  All notes on chart for PCP to review.    The patient was monitored remotely for pain; medications; blood glucose; mood & behavior.    The patient's physical needs include:  help taking medications as prescribed; medication education; needs assistance with ADLs; resources for disability needs; DME supplies.     The patient's mental support needs include:  continued support    The patient's cognitive support needs include:  medication; continued support; needs assistance with ADLs; health care    The patient's psychosocial support needs include:  continued support; coordination of community providers; medication management or adherence    The patient's functional needs include: health care coverage; medication education; needs assistance for ADLs; physical healthcare; physician referral; resources for disability needs    The patient's environmental needs include:  resources for disability needs    Care Plan overall comments:  Still not at goal, however improving. will continue to follow. Has many upcoming appointments and referrals to specialists. Multiple hospitalizations./readmissions.    Refer to previous outreach notes for more information on the areas listed above.    Monthly Billing Diagnoses  (J96.01,  J96.12) Acute hypoxic on chronic hypercapnic respiratory failure    (L97.421) Skin ulcer of left heel, limited to breakdown of skin    (E11.65,  Z79.4) Type 2 diabetes mellitus with hyperglycemia, with  long-term current use of insulin    (N18.31) Stage 3a chronic kidney disease    Medications   Medications have been reconciled    Care Plan progress this month:      Recently Modified Care Plans Updates made since 3/29/2024 12:00 AM      No recently modified care plans.            Current Specialty Plan of Care Status signed by both patient and provider    Instructions   Patient was provided an electronic copy of care plan  CCM services were explained and offered and patient has accepted these services.  Patient has given their written consent to receive CCM services and understands that this includes the authorization of electronic communication of medical information with the other treating providers.  Patient understands that they may stop CCM services at any time and these changes will be effective at the end of the calendar month and will effectively revocate the agreement of CCM services.  Patient understands that only one practitioner can furnish and be paid for CCM services during one calendar month.  Patient also understands that there may be co-payment or deductible fees in association with CCM services.  Patient will continue with at least monthly follow-up calls with the Ambulatory .    Tara GOMEZ  Ambulatory Case Management    4/29/2024, 08:40 EDT

## 2024-04-29 NOTE — PAYOR COMM NOTE
"          AUTHORIZATION REQUEST from another facility        PATIENT INFORMATION  Name:             Preston Wallis  MRN#:            [id number]        ADMISSION INFORMATION  CLASS:          Inpatient   DOS:               4/28/2024        ADMISSION DIAGNOSIS AND HOSPITAL PROBLEMS       Acute hypoxic on chronic hypercapnic respiratory failure [J96.01, J96.12]         ADMITTING PROVIDER INFORMATION  Name/NPI       Gatito Higginbotham MD [[id number]]  Phone:            Phone: [phone]        RENDERING FACILITY  Name:             [facility]   NPI:                 [id number]  TID:                 [id number]  Address:        [address] [place], [place] [address] [address]        CASE MANAGEMENT CONTACT INFORMATION  Name:             RANDI Parks  Phone:            [phone]  Fax:                [phone]          Date of Birth   1965    Social Security Number       Address   [address] [place] [address]    Home Phone       MRN   [id number]       Taoism   Mosque    Marital Status                               Admission Date   4/28/24    Admission Type   Urgent    Admitting Provider   Gatito Higginbotham MD    Attending Provider   Gatito Higginbotham MD    Department, Room/Bed   [facility] CORONARY CARE UNIT, C05/1       Discharge Date       Discharge Disposition       Discharge Destination                                 Attending Provider: Gatito Higginbotham MD    Allergies: Benadryl [Diphenhydramine], Proventil [Albuterol]    Isolation: None   Infection: MRSA/History Only (12/15/23)   Code Status: CPR    Ht: 175.3 cm (69.02\")   Wt: 116 kg (255 lb 11.7 oz)    Admission Cmt: None   Principal Problem: Acute hypoxic on chronic hypercapnic respiratory failure [J96.01,J96.12]                   Active Insurance as of 4/28/2024       Primary Coverage       Payor Plan Insurance Group Employer/Plan Group    [facility] ANDRES HE [id number]       Payor Plan " Address Payor Plan Phone Number Payor Plan Fax Number Effective Dates    [address] [address]   3/1/2024 - None Entered    [facility] [id number]         Subscriber Name Subscriber Birth Date Member ID       PRESTON WALLIS 1965 [id number]                     Emergency Contacts        (Rel.) Home Phone Work Phone Mobile Phone    Kami Wallis (Spouse) [phone]    Elaine Zaldivar (Daughter) -- -- [phone]                 History & Physical        Goose LakeIam DO at 24 2340          [facility]   HISTORY AND PHYSICAL    Patient Name: Preston Wallis  : 1965  MRN: [id number]  Primary Care Physician:  Kimmy Riley MD  Date of admission: 2024    Subjective   Subjective     Chief Complaint: Altered mental status    History of Present Illness  Direct transfer from [facility]  ED.  Patient presented to outside ED due to lethargy and increased blood glucose of 515.  Although lethargic but  was responding appropriately.  Patient has past medical history of hypercapnic and hypoxemic respiratory failure, obstructive sleep apnea, A-fib, seizure disorder, CKD 3B, diabetes mellitus, anemia, recent CABG, BPH with self cath, and diabetic foot ulcers.  Patient was recently discharged on  from our hospital because of Pseudomonas pneumonia on Levaquin.  Pleural fluid  from thoracentesis was transudative.  Workup at the outside ED showed O2 sat to be 76% on 2 L.  Other vital signs stable patient is afebrile.  Initial ABG showed pH of 7.19 pCO2 of 103 and pO2 45.  With BiPAP 40% FiO2 his ABG showed pH of 7.25 pCO2 91 and pO2 of 62.  Patient is more alert, sitting and talking.  Chest x-ray showed worsening of right more than left effusion with possible right lower lobe pneumonia.  His kidney function has gotten worse with a creatinine of 2.4.  Recent creatinine was 1.7.  White cell count is 15,000.  Repeat blood sugar is better.  Patient given IV Vanco  and cefepime at the outside facility and hospitalist has been called to admit him here.  Intensivist was also consulted by outside facility for ICU admission.  Review of Systems   As stated above in history of stenosis other systems were reviewed and otherwise negative  Personal History     Past Medical History:   Diagnosis Date    Age-related cognitive decline     Allergic contact dermatitis     Allergies     Anemia     Bronchiectasis with acute lower respiratory infection     Charcot foot due to diabetes mellitus 9/10/2013    Chronic diastolic (congestive) heart failure     Chronic kidney disease     Chronic respiratory failure with hypoxia     Closed supracondylar fracture of femur 1/12/2022    COPD (chronic obstructive pulmonary disease)     Deep vein thrombosis (DVT) of lower extremity associated with air travel 1/13/2023    Dependence on supplemental oxygen     Eczema     Erectile dysfunction     due to organic reasons    Essential (primary) hypertension     Fracture     closed fracture of other tarsal and metatarsal bones    Fracture of proximal humerus 1/13/2023    GERD without esophagitis     High risk medication use     Hypercholesteremia     Hypomagnesemia     Infected stasis ulcer of left lower extremity 1/13/2023    Insomnia     Low back pain     Major depressive disorder     Morbid (severe) obesity due to excess calories     MRSA pneumonia     Muscle weakness     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 without evidence of necrosis     Obstructive sleep apnea (adult) (pediatric)     Other forms of dyspnea     Other long term (current) drug therapy     Other specified noninfective gastroenteritis and colitis     Other spondylosis, lumbar region     Pain in both knees     Paroxysmal atrial fibrillation     Peripheral neuropathy     attributed to type 2 diabetes    Pneumonia, unspecified organism     Polyneuropathy     Rash and other nonspecific skin eruption      Syncope and collapse     Tachycardia     Tinnitus 1/13/2023    Type 1 diabetes mellitus with diabetic chronic kidney disease     Type 2 diabetes mellitus     Unspecified fall, initial encounter     Urinary retention        Past Surgical History:   Procedure Laterality Date    CHOLECYSTECTOMY      CYSTOSCOPY      FEMUR SURGERY Left     Shravan placed    KNEE SURGERY Left     OTHER SURGICAL HISTORY Left     venous port, REMOVED    PORTACATH PLACEMENT Right     TIBIAL PLATEAU OPEN REDUCTION INTERNAL FIXATION Left 12/22/2023    Procedure: TIBIAL PLATEAU OPEN REDUCTION INTERNAL FIXATION;  Surgeon: Hugo Kline MD;  Location: [facility];  Service: Orthopedics;  Laterality: Left;    TONSILLECTOMY AND ADENOIDECTOMY         Family History: family history includes Asthma in his father; Cancer in his sister; Coronary artery disease in his mother; Diabetes type II in his mother and sister; Hypertension in his mother. Otherwise pertinent FHx was reviewed and not pertinent to current issue.    Social History:  reports that he quit smoking about 31 years ago. His smoking use included cigarettes. He started smoking about 43 years ago. He has a 12 pack-year smoking history. He has been exposed to tobacco smoke. He has never used smokeless tobacco. He reports that he does not currently use alcohol. He reports that he does not use drugs.    Home Medications:  Budeson-Glycopyrrol-Formoterol, Calcium Citrate, DULoxetine, Insulin Lispro, NIFEdipine XL, O2, Vitamin D3, albuterol sulfate HFA, apixaban, aspirin, atorvastatin, bumetanide, busPIRone, carvedilol, dapagliflozin, docusate sodium, famotidine, ferrous gluconate, finasteride, folic acid, insulin detemir, ipratropium-albuterol, levETIRAcetam, levoFLOXacin, magnesium oxide, montelukast, multivitamin with iron, traZODone, and vitamin C    Allergies:  Allergies   Allergen Reactions    Benadryl [Diphenhydramine] Itching    Proventil [Albuterol] Other (See Comments)     Mouth  sores         Objective    Objective     Vitals:    Heart rate 77  Respiratory rate 27  Blood pressure 115/53  Temp 98.6  O2 saturation 94%    Physical Exam  Constitutional: Chronically ill appearing on BiPAP HENT:  Head:  Normocephalic and atraumatic.  Mouth:  Moist mucous membranes.    Eyes:  Conjunctivae and EOM are normal. No scleral icterus.    Neck:  Neck supple.  No JVD present.    Cardiovascular:  Normal rate, regular rhythm and normal heart sounds with no murmur.  Pulmonary/Chest:  No respiratory distress, no wheezes, no crackles, with normal breath sounds and good air movement.  Abdominal:  Soft. No distension and no tenderness.   Musculoskeletal:  No tenderness, and no deformity.  No red or swollen joints anywhere.    Neurological:  Alert and oriented to person, place, and time.  No cranial nerve deficit.    Skin:  Skin is warm and dry. No rash noted. No pallor.   Peripheral vascular:  No clubbing, no cyanosis, no edema.  Psychiatric: Appropriate mood and affect  :    Result Review    Result Review:  I have personally reviewed the results from the time of this admission to 4/28/2024 23:40 EDT and agree with these findings:  [x]  Laboratory list / accordion  []  Microbiology  [x]  Radiology  []  EKG/Telemetry   []  Cardiology/Vascular   []  Pathology  []  Old records  []  Other:  Most notable findings include: Reviewed from outside facility  Assessment & Plan   Assessment / Plan     Brief Patient Summary:  Preston Wallis is a 58 y.o. male who presents to an outside ED with lethargy.  He was found to be in acute on chronic hypoxic hypercapnic respiratory failure.  Because he was still talking that he was not intubated but required ICU care which outside hospital did not provide  Active Hospital Problems:  1.  Acute on chronic hypoxic hypercapnic respiratory failure  2.  Clinical sepsis  3.  Right lower lobe pneumonia  4.  Acute exacerbation of COPD  5.  Right greater than left pleural effusion  6.   Respiratory acidosis    Plan:   Patient will be admitted to the hospital service  Patient has been started on the pneumonia and sepsis protocols  Patient will be started on broad-spectrum antibiotics  Patient will be kept on BiPAP and repeat ABG ordered  Cautiously hydrate  Pulmonary toilet  DuoNeb every 4  Pulmonary consultation  Possible need for thoracentesis    DVT prophylaxis:  No DVT prophylaxis order currently exists.        CODE STATUS:       Admission Status:  I believe this patient meets inpatient status.    Iam Gayle DO    Electronically signed by Iam Gayle DO at 04/29/24 0503       Emergency Department Notes    No notes of this type exist for this encounter.          Physician Progress Notes (last 24 hours)        Gatito Higginbotham MD at 04/28/24 1855          Direct transfer from [facility]  ED.  Patient presented to outside ED due to lethargy and increased blood glucose of 515.  Although lethargic but  was responding appropriately.  Patient has past medical history of hypercapnic and hypoxemic respiratory failure, obstructive sleep apnea, A-fib, seizure disorder, CKD 3B, diabetes mellitus, anemia, recent CABG, BPH with self cath, and diabetic foot ulcers.  Patient was recently discharged on April 19 from our hospital because of Pseudomonas pneumonia on Levaquin.  Pleural fluid  from thoracentesis was transudative.  Workup at the outside ED showed O2 sat to be 76% on 2 L.  Other vital signs stable patient is afebrile.  Initial ABG showed pH of 7.19 pCO2 of 103 and pO2 45.  With BiPAP 40% FiO2 his ABG showed pH of 7.25 pCO2 91 and pO2 of 62.  Patient is more alert, sitting and talking.  Chest x-ray showed worsening of right more than left effusion with possible right lower lobe pneumonia.  His kidney function has gotten worse with a creatinine of 2.4.  Recent creatinine was 1.7.  White cell count is 15,000.  Repeat blood sugar is better.  Patient given IV Vanco and cefepime at the outside  facility and hospitalist has been called to admit him here.  Intensivist was also consulted by outside facility for ICU admission.    Electronically signed by Gatito Higginbotham MD, 04/28/24, 7:01 PM EDT.      Electronically signed by Gatito Higginbotham MD at 04/28/24 1901       Consult Notes (last 24 hours)  Notes from 04/28/24 0751 through 04/29/24 0751   No notes of this type exist for this encounter.

## 2024-04-29 NOTE — PROGRESS NOTES
RT EQUIPMENT DEVICE RELATED - SKIN ASSESSMENT    RT Medical Equipment/Device:     NIV Mask:  Under-the-nose   size:  B    Skin Assessment:      Chin:  Intact  Nares:  Intact  Neck:  Intact    Device Skin Pressure Protection:  Absorbent pad utilzed/changed    Nurse Notification:  Kaylin Luna, RRT

## 2024-04-29 NOTE — CASE MANAGEMENT/SOCIAL WORK
Mr. Wallis recently discharged from [facility] on 04/19. The patient has a home Astral unit. Pateint's compliance report obtained from his DME, since discharge, patent has only used the device 3 days for a mininum of 4 hours. With no usage noted after April 25th.

## 2024-04-29 NOTE — THERAPY EVALUATION
RT EQUIPMENT DEVICE RELATED - SKIN ASSESSMENT    RT Medical Equipment/Device:     NIV Mask:  Under-the-nose   size:  b    Skin Assessment:      Head/neck:  Intact    Device Skin Pressure Protection:  Pressure points protected    Nurse Notification:  Kaylin Garrett, RRT

## 2024-04-29 NOTE — TELEPHONE ENCOUNTER
Caller: Kami Wallis    Relationship: Emergency Contact    Best call back number: [phone]    Who are you requesting to speak with (clinical staff, provider,  specific staff member):   DR ANGULO    What was the call regardin24 APPT  PT HAD TO RESCHEDULE HIS NEW PT APPT HE HAD TODAY DUE TO BE IN THE HOSP.

## 2024-04-29 NOTE — CONSULTS
04/29/24 1230   Coping/Psychosocial   Additional Documentation Spiritual Care (Group)   Spiritual Care   Spiritual Care Source  initiative   Spiritual Care Follow-Up follow-up planned regularly for general support   Response to Spiritual Care thanks expressed   Spiritual Care Interventions other (see comments)  (introductions made and my role explained. pt educated on how to contact  serves)   Spiritual Care Visit Type initial   Spiritual Care Request spiritual/moral support   Receptivity to Spiritual Care visit welcomed

## 2024-04-29 NOTE — H&P
----- Message from Joselo Pack MD sent at 2/3/2017  7:49 AM CST -----  Please call patient with normal recent results.   [facility]   HISTORY AND PHYSICAL    Patient Name: Preston Wallis  : 1965  MRN: [id number]  Primary Care Physician:  Kimmy Riley MD  Date of admission: 2024    Subjective   Subjective     Chief Complaint: Altered mental status    History of Present Illness  Direct transfer from [facility]  ED.  Patient presented to outside ED due to lethargy and increased blood glucose of 515.  Although lethargic but  was responding appropriately.  Patient has past medical history of hypercapnic and hypoxemic respiratory failure, obstructive sleep apnea, A-fib, seizure disorder, CKD 3B, diabetes mellitus, anemia, recent CABG, BPH with self cath, and diabetic foot ulcers.  Patient was recently discharged on  from our hospital because of Pseudomonas pneumonia on Levaquin.  Pleural fluid  from thoracentesis was transudative.  Workup at the outside ED showed O2 sat to be 76% on 2 L.  Other vital signs stable patient is afebrile.  Initial ABG showed pH of 7.19 pCO2 of 103 and pO2 45.  With BiPAP 40% FiO2 his ABG showed pH of 7.25 pCO2 91 and pO2 of 62.  Patient is more alert, sitting and talking.  Chest x-ray showed worsening of right more than left effusion with possible right lower lobe pneumonia.  His kidney function has gotten worse with a creatinine of 2.4.  Recent creatinine was 1.7.  White cell count is 15,000.  Repeat blood sugar is better.  Patient given IV Vanco and cefepime at the outside facility and hospitalist has been called to admit him here.  Intensivist was also consulted by outside facility for ICU admission.  Review of Systems   As stated above in history of stenosis other systems were reviewed and otherwise negative  Personal History     Past Medical History:   Diagnosis Date   • Age-related cognitive decline    • Allergic contact dermatitis    • Allergies    • Anemia    • Bronchiectasis with acute lower respiratory infection    • Charcot foot due to diabetes mellitus 9/10/2013    • Chronic diastolic (congestive) heart failure    • Chronic kidney disease    • Chronic respiratory failure with hypoxia    • Closed supracondylar fracture of femur 1/12/2022   • COPD (chronic obstructive pulmonary disease)    • Deep vein thrombosis (DVT) of lower extremity associated with air travel 1/13/2023   • Dependence on supplemental oxygen    • Eczema    • Erectile dysfunction     due to organic reasons   • Essential (primary) hypertension    • Fracture     closed fracture of other tarsal and metatarsal bones   • Fracture of proximal humerus 1/13/2023   • GERD without esophagitis    • High risk medication use    • Hypercholesteremia    • Hypomagnesemia    • Infected stasis ulcer of left lower extremity 1/13/2023   • Insomnia    • Low back pain    • Major depressive disorder    • Morbid (severe) obesity due to excess calories    • MRSA pneumonia    • Muscle weakness    • 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 without evidence of necrosis    • Obstructive sleep apnea (adult) (pediatric)    • Other forms of dyspnea    • Other long term (current) drug therapy    • Other specified noninfective gastroenteritis and colitis    • Other spondylosis, lumbar region    • Pain in both knees    • Paroxysmal atrial fibrillation    • Peripheral neuropathy     attributed to type 2 diabetes   • Pneumonia, unspecified organism    • Polyneuropathy    • Rash and other nonspecific skin eruption    • Syncope and collapse    • Tachycardia    • Tinnitus 1/13/2023   • Type 1 diabetes mellitus with diabetic chronic kidney disease    • Type 2 diabetes mellitus    • Unspecified fall, initial encounter    • Urinary retention        Past Surgical History:   Procedure Laterality Date   • CHOLECYSTECTOMY     • CYSTOSCOPY     • FEMUR SURGERY Left     Shravan placed   • KNEE SURGERY Left    • OTHER SURGICAL HISTORY Left     venous port, REMOVED   • PORTACATH PLACEMENT Right    • TIBIAL PLATEAU OPEN  REDUCTION INTERNAL FIXATION Left 12/22/2023    Procedure: TIBIAL PLATEAU OPEN REDUCTION INTERNAL FIXATION;  Surgeon: Hugo Kline MD;  Location: [facility];  Service: Orthopedics;  Laterality: Left;   • TONSILLECTOMY AND ADENOIDECTOMY         Family History: family history includes Asthma in his father; Cancer in his sister; Coronary artery disease in his mother; Diabetes type II in his mother and sister; Hypertension in his mother. Otherwise pertinent FHx was reviewed and not pertinent to current issue.    Social History:  reports that he quit smoking about 31 years ago. His smoking use included cigarettes. He started smoking about 43 years ago. He has a 12 pack-year smoking history. He has been exposed to tobacco smoke. He has never used smokeless tobacco. He reports that he does not currently use alcohol. He reports that he does not use drugs.    Home Medications:  Budeson-Glycopyrrol-Formoterol, Calcium Citrate, DULoxetine, Insulin Lispro, NIFEdipine XL, O2, Vitamin D3, albuterol sulfate HFA, apixaban, aspirin, atorvastatin, bumetanide, busPIRone, carvedilol, dapagliflozin, docusate sodium, famotidine, ferrous gluconate, finasteride, folic acid, insulin detemir, ipratropium-albuterol, levETIRAcetam, levoFLOXacin, magnesium oxide, montelukast, multivitamin with iron, traZODone, and vitamin C    Allergies:  Allergies   Allergen Reactions   • Benadryl [Diphenhydramine] Itching   • Proventil [Albuterol] Other (See Comments)     Mouth sores         Objective    Objective     Vitals:    Heart rate 77  Respiratory rate 27  Blood pressure 115/53  Temp 98.6  O2 saturation 94%    Physical Exam  Constitutional: Chronically ill appearing on BiPAP HENT:  Head:  Normocephalic and atraumatic.  Mouth:  Moist mucous membranes.    Eyes:  Conjunctivae and EOM are normal. No scleral icterus.    Neck:  Neck supple.  No JVD present.    Cardiovascular:  Normal rate, regular rhythm and normal heart sounds with no  murmur.  Pulmonary/Chest:  No respiratory distress, no wheezes, no crackles, with normal breath sounds and good air movement.  Abdominal:  Soft. No distension and no tenderness.   Musculoskeletal:  No tenderness, and no deformity.  No red or swollen joints anywhere.    Neurological:  Alert and oriented to person, place, and time.  No cranial nerve deficit.    Skin:  Skin is warm and dry. No rash noted. No pallor.   Peripheral vascular:  No clubbing, no cyanosis, no edema.  Psychiatric: Appropriate mood and affect  :    Result Review    Result Review:  I have personally reviewed the results from the time of this admission to 4/28/2024 23:40 EDT and agree with these findings:  [x]  Laboratory list / accordion  []  Microbiology  [x]  Radiology  []  EKG/Telemetry   []  Cardiology/Vascular   []  Pathology  []  Old records  []  Other:  Most notable findings include: Reviewed from outside facility  Assessment & Plan   Assessment / Plan     Brief Patient Summary:  Preston Wallis is a 58 y.o. male who presents to an outside ED with lethargy.  He was found to be in acute on chronic hypoxic hypercapnic respiratory failure.  Because he was still talking that he was not intubated but required ICU care which outside hospital did not provide  Active Hospital Problems:  1.  Acute on chronic hypoxic hypercapnic respiratory failure  2.  Clinical sepsis  3.  Right lower lobe pneumonia  4.  Acute exacerbation of COPD  5.  Right greater than left pleural effusion  6.  Respiratory acidosis    Plan:   Patient will be admitted to the hospital service  Patient has been started on the pneumonia and sepsis protocols  Patient will be started on broad-spectrum antibiotics  Patient will be kept on BiPAP and repeat ABG ordered  Cautiously hydrate  Pulmonary toilet  DuoNeb every 4  Pulmonary consultation  Possible need for thoracentesis    DVT prophylaxis:  No DVT prophylaxis order currently exists.        CODE STATUS:       Admission Status:  I  believe this patient meets inpatient status.    Iam Gayle, DO

## 2024-04-29 NOTE — PLAN OF CARE
Goal Outcome Evaluation:      Patient alert and oriented this morning, reports no soa. Wore the bipap throughout the night 18/8 r24 28%. Tolerating well at this time.

## 2024-04-29 NOTE — CONSULTS
Pulmonary / Critical Care Consult Note      Patient Name: Preston Wallis  : 1965  MRN: [id number]  Primary Care Physician:  Kimmy Riley MD  Referring Physician: Gatito Higginbotham MD  Date of admission: 2024    Subjective   Subjective     Reason for Consult/ Chief Complaint:   Respiratory failure and pneumonia    HPI:  Preston Wallis is a 58 y.o. male well-known to me with a history of severe COPD with FEV1 26% predicted, CHF, recurrent Pseudomonas infections with MDRO Pseudomonas, history of PE in 2019 on Xarelto and tobacco abuse, sleep apnea on home NIPPV, morbid obesity and cognitive decline was transferred overnight from [facility] for worsening respiratory failure.  Patient was recently mated here couple weeks ago and diuresed.  Had thoracentesis at that time that was transudate.  Also grew Pseudomonas.  Ultimately discharged on Levaquin.  Patient's wife states that he is been feeling poorly over the last week.  He came in with increasing shortness of breath, cough and feeling poorly.  Also had marked increased work of breathing with O2 saturations in the 70s with hypercapnia.  Was placed on NIPPV and transferred here.  Currently patient is on NIPPV.  ABG is somewhat better.  Still appears weak and fatigued.  Does appear volume overloaded and x-ray with worsening infiltrate and effusion.  The sensitivities from his Pseudomonas was never speciated.  We contacted the lab and unfortunately this was resistant to Levaquin.          Personal History     Past Medical History:   Diagnosis Date    Age-related cognitive decline     Allergic contact dermatitis     Allergies     Anemia     Bronchiectasis with acute lower respiratory infection     Charcot foot due to diabetes mellitus 9/10/2013    Chronic diastolic (congestive) heart failure     Chronic kidney disease     Chronic respiratory failure with hypoxia     Closed supracondylar fracture of femur 2022    COPD (chronic obstructive pulmonary  disease)     Deep vein thrombosis (DVT) of lower extremity associated with air travel 1/13/2023    Dependence on supplemental oxygen     Eczema     Erectile dysfunction     due to organic reasons    Essential (primary) hypertension     Fracture     closed fracture of other tarsal and metatarsal bones    Fracture of proximal humerus 1/13/2023    GERD without esophagitis     High risk medication use     Hypercholesteremia     Hypomagnesemia     Infected stasis ulcer of left lower extremity 1/13/2023    Insomnia     Low back pain     Major depressive disorder     Morbid (severe) obesity due to excess calories     MRSA pneumonia     Muscle weakness     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 without evidence of necrosis     Obstructive sleep apnea (adult) (pediatric)     Other forms of dyspnea     Other long term (current) drug therapy     Other specified noninfective gastroenteritis and colitis     Other spondylosis, lumbar region     Pain in both knees     Paroxysmal atrial fibrillation     Peripheral neuropathy     attributed to type 2 diabetes    Pneumonia, unspecified organism     Polyneuropathy     Rash and other nonspecific skin eruption     Syncope and collapse     Tachycardia     Tinnitus 1/13/2023    Type 1 diabetes mellitus with diabetic chronic kidney disease     Type 2 diabetes mellitus     Unspecified fall, initial encounter     Urinary retention        Past Surgical History:   Procedure Laterality Date    CHOLECYSTECTOMY      CYSTOSCOPY      FEMUR SURGERY Left     Shravan placed    KNEE SURGERY Left     OTHER SURGICAL HISTORY Left     venous port, REMOVED    PORTACATH PLACEMENT Right     TIBIAL PLATEAU OPEN REDUCTION INTERNAL FIXATION Left 12/22/2023    Procedure: TIBIAL PLATEAU OPEN REDUCTION INTERNAL FIXATION;  Surgeon: Hugo Kline MD;  Location: [facility];  Service: Orthopedics;  Laterality: Left;    TONSILLECTOMY AND ADENOIDECTOMY         Family  History: family history includes Asthma in his father; Cancer in his sister; Coronary artery disease in his mother; Diabetes type II in his mother and sister; Hypertension in his mother. Otherwise pertinent FHx was reviewed and not pertinent to current issue.    Social History:  reports that he quit smoking about 31 years ago. His smoking use included cigarettes. He started smoking about 43 years ago. He has a 12 pack-year smoking history. He has been exposed to tobacco smoke. He has never used smokeless tobacco. He reports that he does not currently use alcohol. He reports that he does not use drugs.    Home Medications:  Budeson-Glycopyrrol-Formoterol, Calcium Citrate, DULoxetine, Insulin Lispro, NIFEdipine XL, O2, Vitamin D3, albuterol sulfate HFA, apixaban, aspirin, atorvastatin, bumetanide, busPIRone, carvedilol, dapagliflozin, docusate sodium, famotidine, ferrous gluconate, finasteride, folic acid, insulin detemir, ipratropium-albuterol, levETIRAcetam, levoFLOXacin, magnesium oxide, montelukast, multivitamin with iron, traZODone, and vitamin C    Allergies:  Allergies   Allergen Reactions    Benadryl [Diphenhydramine] Itching    Proventil [Albuterol] Other (See Comments)     Mouth sores         Objective    Objective     Vitals:   Temp:  [96.3 °F (35.7 °C)-98.8 °F (37.1 °C)] 98.8 °F (37.1 °C)  Heart Rate:  [74-83] 83  Resp:  [25-27] 25  BP: ()/(55-99) 124/55    Physical Exam:  Vital Signs Reviewed   General:  WDWN, Alert, NAD.  Critically ill-appearing male,  HEENT:  PERRL, EOMI.  OP, nares clear, no sinus tenderness  Neck:  Supple, no JVD, no thyromegaly  Lymph: no axillary, cervical, supraclavicular lymphadenopathy noted bilaterally  Chest: Poor aeration with bibasilar crackles and rhonchi, diminished right chest and dull to percussion right chest, on continuous NIPPV, increased work of breathing noted  CV: RRR, no MGR, pulses 2+, equal.  Abd:  Soft, NT, ND, + BS, no HSM  EXT:  no clubbing, no  cyanosis, anasarca, BLE edema  Neuro:  A&Ox3, CN grossly intact, no focal deficits.  Skin: No rashes or lesions noted, multiple tattoos noted         Result Review    Result Review:  I have personally reviewed the results from the time of this admission to 4/29/2024 07:43 EDT and agree with these findings:  [x]  Laboratory  [x]  Microbiology  [x]  Radiology  []  EKG/Telemetry   []  Cardiology/Vascular   []  Pathology  [x]  Old records  []  Other:  Most notable findings include:   Previous hospital records personally reviewed, including our consult and procedure notes  Pleural fluid was transudate and negative for volume overload  Sputum culture grew Pseudomonas.  Per microbiology reports this was resistant to Levaquin.  Not entered in EMR  Chest x-ray with worsening lung infiltrate and right pleural effusion  NT BNP remains elevated  ABG 7.2 5/77/70/33        Lab 04/29/24  0603 04/29/24  0140 04/29/24  0016   WBC  --   --  15.37*   HEMOGLOBIN  --   --  7.8*   HEMATOCRIT  --   --  26.6*   PLATELETS  --   --  293   SODIUM 138  --   --    SODIUM, ARTERIAL  --  133.8*  --    POTASSIUM 4.7  --   --    CHLORIDE 94*  --   --    CO2 37.1*  --   --    BUN 46*  --   --    CREATININE 2.45*  --   --    GLUCOSE 157*  --   --    GLUCOSE, ARTERIAL  --  128*  --    CALCIUM 8.6  --   --      Results for orders placed during the hospital encounter of 02/16/24    Adult Transthoracic Echo Complete W/ Cont if Necessary Per Protocol    Interpretation Summary    Left ventricular systolic function is normal. Left ventricular ejection fraction appears to be 61 - 65%.    Left ventricular wall thickness is consistent with mild concentric hypertrophy.    Left ventricular diastolic function is consistent with (grade I) impaired relaxation.    There are no significant valvular abnormalities.          Assessment & Plan   Assessment / Plan     Active Hospital Problems:  Active Hospital Problems    Diagnosis     **Acute hypoxic on chronic  hypercapnic respiratory failure      Impression:  Acute on chronic hypoxemic and hypercapnic respiratory failure requiring NIPPV  Severe COPD without exacerbation, FEV1 26%  Obesity hypoventilation syndrome  Acute on chronic decompensated congestive diastolic heart failure  Acute cardiogenic pulmonary edema  Recurrent Pseudomonas pneumonia, resistant to Levaquin  Altered mental status  CO2 narcosis  Anasarca  Anemia  CKD  Hypocalcemia  Medical noncompliance of NIV  History of MILADY  Tobacco abuse in remission     Plan:  Stop IV fluids  Start Bumex 2 mg IV twice daily and spot dose metolazone 5 mg x 1  Will do diagnostic and therapeutic right-sided thoracentesis  Continue BiPAP on current settings with exception of dropping rate from 30 down to 8  Can give breaks off BiPAP with Airvo.  Wean O2 to keep SPO2 greater than 90%  No indication for steroids  Continue nebulizers and aggressive bronchopulmonary hygiene  Continue Zosyn and add MOIZ nebs.  Pseudomonas grew on last admission sputum culture and sensitivities never reported.  Per micro this was resistant to Levaquin, which unfortunately he was discharged on  Trend renal panel and electrolytes  Can restart Eliquis for atrial fibrillation  Last admission we arranged for NIPPV and chest vest.  Can use home NIPPV and can use chest vest here per protocol.  Per compliance reports he has been noncompliant since he has been discharged    DVT prophylaxis:  Medical DVT prophylaxis orders are present.      The patient is critically ill in the ICU with acute on chronic hypoxemic and hypercapnic respiratory failure requiring NIPPV, Pseudomonas pneumonia, decompensated heart failure volume overload. Multidisciplinary bedside critical care rounds were performed with nursing staff, respiratory therapy, pharmacy, nutritional services, social work. I have personally reviewed the chart, labs and any pertinent imaging available.  I have spent 36 minutes of critical care time, excluding  procedures, in the care of this patient.    Electronically signed by Severiano Carolina MD, 04/29/24, 11:03 AM EDT.

## 2024-04-29 NOTE — PLAN OF CARE
Goal Outcome Evaluation:  Plan of Care Reviewed With: patient        Progress: no change  Outcome Evaluation: Patient came from [facility] on a Bipap, was placed on our Bipap 18/8, rate-24, 40%oxygen. Patient remained on Bipap all night long.

## 2024-04-29 NOTE — SIGNIFICANT NOTE
Wound Eval / Progress Noted    YAIMA [place]     Patient Name: Preston Wallis  : 1965  MRN: [id number]  Today's Date: 2024                 Admit Date: 2024    Visit Dx:  No diagnosis found.      Acute hypoxic on chronic hypercapnic respiratory failure        Past Medical History:   Diagnosis Date    Age-related cognitive decline     Allergic contact dermatitis     Allergies     Anemia     Bronchiectasis with acute lower respiratory infection     Charcot foot due to diabetes mellitus 9/10/2013    Chronic diastolic (congestive) heart failure     Chronic kidney disease     Chronic respiratory failure with hypoxia     Closed supracondylar fracture of femur 2022    COPD (chronic obstructive pulmonary disease)     Deep vein thrombosis (DVT) of lower extremity associated with air travel 2023    Dependence on supplemental oxygen     Eczema     Erectile dysfunction     due to organic reasons    Essential (primary) hypertension     Fracture     closed fracture of other tarsal and metatarsal bones    Fracture of proximal humerus 2023    GERD without esophagitis     High risk medication use     Hypercholesteremia     Hypomagnesemia     Infected stasis ulcer of left lower extremity 2023    Insomnia     Low back pain     Major depressive disorder     Morbid (severe) obesity due to excess calories     MRSA pneumonia     Muscle weakness     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 without evidence of necrosis     Obstructive sleep apnea (adult) (pediatric)     Other forms of dyspnea     Other long term (current) drug therapy     Other specified noninfective gastroenteritis and colitis     Other spondylosis, lumbar region     Pain in both knees     Paroxysmal atrial fibrillation     Peripheral neuropathy     attributed to type 2 diabetes    Pneumonia, unspecified organism     Polyneuropathy     Rash and other nonspecific skin eruption     Syncope and collapse      Tachycardia     Tinnitus 1/13/2023    Type 1 diabetes mellitus with diabetic chronic kidney disease     Type 2 diabetes mellitus     Unspecified fall, initial encounter     Urinary retention         Past Surgical History:   Procedure Laterality Date    CHOLECYSTECTOMY      CYSTOSCOPY      FEMUR SURGERY Left     Shravan placed    KNEE SURGERY Left     OTHER SURGICAL HISTORY Left     venous port, REMOVED    PORTACATH PLACEMENT Right     TIBIAL PLATEAU OPEN REDUCTION INTERNAL FIXATION Left 12/22/2023    Procedure: TIBIAL PLATEAU OPEN REDUCTION INTERNAL FIXATION;  Surgeon: Hugo Kline MD;  Location: [facility] OR;  Service: Orthopedics;  Laterality: Left;    TONSILLECTOMY AND ADENOIDECTOMY       Physical Assessment:     04/29/24 1038   Wound 12/22/23 1322 Left posterior heel Pressure Injury   Placement Date/Time: 12/22/23 1322   Side: Left  Orientation: posterior  Location: heel  Primary Wound Type: Pressure Injury   Pressure Injury Stage U   Dressing Appearance dry;intact   Closure None   Base dry;black eschar   Periwound dry;intact   Periwound Temperature warm   Periwound Skin Turgor soft   Edges open   Wound Length (cm) 4.8 cm   Wound Width (cm) 6.1 cm   Wound Depth (cm) 0.1 cm   Wound Surface Area (cm^2) 29.28 cm^2   Wound Volume (cm^3) 2.928 cm^3   Drainage Amount none   Care, Wound cleansed with;sterile normal saline   Dressing Care dressing applied;dressing moistened;antimicrobial agent applied;gauze, wet-to-moist;gauze, dry;other (see comments)  (betadine moistened fluffed gauze)   Periwound Care absorptive dressing applied   Wound 02/16/24 1700 Left posterior foot Pressure Injury   Placement Date/Time: 02/16/24 1700   Side: Left  Orientation: posterior  Location: foot  Primary Wound Type: Pressure Injury   Pressure Injury Stage U   Dressing Appearance dry;intact   Closure None   Base dry;black eschar   Periwound dry;intact   Periwound Temperature warm   Periwound Skin Turgor soft   Edges open    Drainage Amount none   Care, Wound cleansed with;sterile normal saline   Dressing Care dressing applied;dressing moistened;antimicrobial agent applied;gauze, wet-to-moist;gauze, dry   Periwound Care absorptive dressing applied    Left plantar foot     Left heel    Wound Check / Follow-up: Patient seen today for wound consult.  Patient is awake and alert at time of visit and agreeable to assessment.  Patient is currently in CICU and on continuous BiPAP at this time.    Patient with chronic pressure injuries to left heel and left plantar foot.  Patient's wife at bedside reports these injuries occurred while patient was in a nursing facility for rehabilitation.  Dry, stable eschar is present obscuring view of wound bases.  Cleanse wounds with normal saline and gauze.  Recommending daily dressing changes with Betadine moistened fluffed gauze, dry gauze and gauze roll.  Discussed this treatment plan with attending physician.    Bilateral gluteal aspects are intact with blanchable redness noted.  Recommending skin care / skin protection with application of barrier cream.    Recommending to implement every 2 hour turns, offload heels at all times, and keep patient free from moisture.    Doyle catheter in place for bladder management.    Impression: Chronic wounds to left heel and left plantar foot.  Blanchable redness to bilateral gluteal aspects.    Short term goals: Regain skin integrity, skin protection, moisture prevention, pressure reduction, daily dressing changes, skin care.    Jolly Coronado RN    4/29/2024    12:47 EDT

## 2024-04-30 LAB
ABO GROUP BLD: NORMAL
ANION GAP SERPL CALCULATED.3IONS-SCNC: 20.9 MMOL/L (ref 5–15)
BLD GP AB SCN SERPL QL: NEGATIVE
BUN SERPL-MCNC: 38 MG/DL (ref 6–20)
BUN/CREAT SERPL: 20 (ref 7–25)
CALCIUM SPEC-SCNC: 6.8 MG/DL (ref 8.6–10.5)
CHLORIDE SERPL-SCNC: 100 MMOL/L (ref 98–107)
CO2 SERPL-SCNC: 30.1 MMOL/L (ref 22–29)
CREAT SERPL-MCNC: 1.9 MG/DL (ref 0.76–1.27)
DEPRECATED RDW RBC AUTO: 49.3 FL (ref 37–54)
EGFRCR SERPLBLD CKD-EPI 2021: 40.4 ML/MIN/1.73
ERYTHROCYTE [DISTWIDTH] IN BLOOD BY AUTOMATED COUNT: 14.6 % (ref 12.3–15.4)
FUNGUS WND CULT: NORMAL
GLUCOSE BLDC GLUCOMTR-MCNC: 183 MG/DL (ref 70–99)
GLUCOSE BLDC GLUCOMTR-MCNC: 205 MG/DL (ref 70–99)
GLUCOSE BLDC GLUCOMTR-MCNC: 228 MG/DL (ref 70–99)
GLUCOSE SERPL-MCNC: 97 MG/DL (ref 65–99)
HCT VFR BLD AUTO: 22.6 % (ref 37.5–51)
HGB BLD-MCNC: 6.8 G/DL (ref 13–17.7)
Lab: NORMAL
MAGNESIUM SERPL-MCNC: 1.6 MG/DL (ref 1.6–2.6)
MCH RBC QN AUTO: 27.4 PG (ref 26.6–33)
MCHC RBC AUTO-ENTMCNC: 30.1 G/DL (ref 31.5–35.7)
MCV RBC AUTO: 91.1 FL (ref 79–97)
MYCOBACTERIUM SPEC CULT: NORMAL
NIGHT BLUE STAIN TISS: NORMAL
PHOSPHATE SERPL-MCNC: 2.5 MG/DL (ref 2.5–4.5)
PLATELET # BLD AUTO: 217 10*3/MM3 (ref 140–450)
PMV BLD AUTO: 8.8 FL (ref 6–12)
POTASSIUM SERPL-SCNC: 3.1 MMOL/L (ref 3.5–5.2)
RBC # BLD AUTO: 2.48 10*6/MM3 (ref 4.14–5.8)
RH BLD: NEGATIVE
SODIUM SERPL-SCNC: 151 MMOL/L (ref 136–145)
T&S EXPIRATION DATE: NORMAL
WBC NRBC COR # BLD AUTO: 9.41 10*3/MM3 (ref 3.4–10.8)

## 2024-04-30 PROCEDURE — 94660 CPAP INITIATION&MGMT: CPT

## 2024-04-30 PROCEDURE — 94799 UNLISTED PULMONARY SVC/PX: CPT

## 2024-04-30 PROCEDURE — 80048 BASIC METABOLIC PNL TOTAL CA: CPT | Performed by: INTERNAL MEDICINE

## 2024-04-30 PROCEDURE — 99233 SBSQ HOSP IP/OBS HIGH 50: CPT | Performed by: INTERNAL MEDICINE

## 2024-04-30 PROCEDURE — 86923 COMPATIBILITY TEST ELECTRIC: CPT

## 2024-04-30 PROCEDURE — P9016 RBC LEUKOCYTES REDUCED: HCPCS

## 2024-04-30 PROCEDURE — 86901 BLOOD TYPING SEROLOGIC RH(D): CPT | Performed by: INTERNAL MEDICINE

## 2024-04-30 PROCEDURE — 86850 RBC ANTIBODY SCREEN: CPT | Performed by: INTERNAL MEDICINE

## 2024-04-30 PROCEDURE — 97161 PT EVAL LOW COMPLEX 20 MIN: CPT

## 2024-04-30 PROCEDURE — 82948 REAGENT STRIP/BLOOD GLUCOSE: CPT

## 2024-04-30 PROCEDURE — 94761 N-INVAS EAR/PLS OXIMETRY MLT: CPT

## 2024-04-30 PROCEDURE — 25010000002 PIPERACILLIN SOD-TAZOBACTAM PER 1 G: Performed by: INTERNAL MEDICINE

## 2024-04-30 PROCEDURE — 63710000001 INSULIN DETEMIR PER 5 UNITS: Performed by: INTERNAL MEDICINE

## 2024-04-30 PROCEDURE — 84100 ASSAY OF PHOSPHORUS: CPT | Performed by: INTERNAL MEDICINE

## 2024-04-30 PROCEDURE — 86900 BLOOD TYPING SEROLOGIC ABO: CPT

## 2024-04-30 PROCEDURE — 63710000001 INSULIN LISPRO (HUMAN) PER 5 UNITS: Performed by: INTERNAL MEDICINE

## 2024-04-30 PROCEDURE — 82948 REAGENT STRIP/BLOOD GLUCOSE: CPT | Performed by: INTERNAL MEDICINE

## 2024-04-30 PROCEDURE — 85027 COMPLETE CBC AUTOMATED: CPT | Performed by: INTERNAL MEDICINE

## 2024-04-30 PROCEDURE — 86900 BLOOD TYPING SEROLOGIC ABO: CPT | Performed by: INTERNAL MEDICINE

## 2024-04-30 PROCEDURE — 36430 TRANSFUSION BLD/BLD COMPNT: CPT

## 2024-04-30 PROCEDURE — 83735 ASSAY OF MAGNESIUM: CPT | Performed by: INTERNAL MEDICINE

## 2024-04-30 PROCEDURE — 94664 DEMO&/EVAL PT USE INHALER: CPT

## 2024-04-30 RX ORDER — BUSPIRONE HYDROCHLORIDE 15 MG/1
15 TABLET ORAL 2 TIMES DAILY PRN
Status: DISCONTINUED | OUTPATIENT
Start: 2024-04-30 | End: 2024-05-03 | Stop reason: HOSPADM

## 2024-04-30 RX ORDER — DULOXETIN HYDROCHLORIDE 30 MG/1
30 CAPSULE, DELAYED RELEASE ORAL DAILY
Status: DISCONTINUED | OUTPATIENT
Start: 2024-04-30 | End: 2024-05-03 | Stop reason: HOSPADM

## 2024-04-30 RX ORDER — HYDROCHLOROTHIAZIDE 25 MG/1
25 TABLET ORAL DAILY
Status: DISCONTINUED | OUTPATIENT
Start: 2024-04-30 | End: 2024-05-01

## 2024-04-30 RX ADMIN — FERROUS SULFATE TAB 325 MG (65 MG ELEMENTAL FE) 325 MG: 325 (65 FE) TAB at 08:47

## 2024-04-30 RX ADMIN — INSULIN DETEMIR 10 UNITS: 100 INJECTION, SOLUTION SUBCUTANEOUS at 21:40

## 2024-04-30 RX ADMIN — INSULIN LISPRO 3 UNITS: 100 INJECTION, SOLUTION INTRAVENOUS; SUBCUTANEOUS at 17:37

## 2024-04-30 RX ADMIN — Medication 10 ML: at 22:41

## 2024-04-30 RX ADMIN — FAMOTIDINE 40 MG: 20 TABLET ORAL at 08:46

## 2024-04-30 RX ADMIN — BUDESONIDE 0.5 MG: 0.5 INHALANT RESPIRATORY (INHALATION) at 07:11

## 2024-04-30 RX ADMIN — FINASTERIDE 5 MG: 5 TABLET, FILM COATED ORAL at 21:41

## 2024-04-30 RX ADMIN — HYDROCHLOROTHIAZIDE 25 MG: 25 TABLET ORAL at 10:18

## 2024-04-30 RX ADMIN — SENNOSIDES AND DOCUSATE SODIUM 2 TABLET: 50; 8.6 TABLET ORAL at 08:47

## 2024-04-30 RX ADMIN — LEVETIRACETAM 500 MG: 500 TABLET, FILM COATED ORAL at 08:47

## 2024-04-30 RX ADMIN — Medication 1 MG: at 21:42

## 2024-04-30 RX ADMIN — Medication 10 ML: at 08:47

## 2024-04-30 RX ADMIN — BUDESONIDE 0.5 MG: 0.5 INHALANT RESPIRATORY (INHALATION) at 20:14

## 2024-04-30 RX ADMIN — ATORVASTATIN CALCIUM 20 MG: 20 TABLET, FILM COATED ORAL at 21:41

## 2024-04-30 RX ADMIN — Medication 2000 UNITS: at 08:47

## 2024-04-30 RX ADMIN — PIPERACILLIN SODIUM AND TAZOBACTAM SODIUM 3.38 G: 3; .375 INJECTION, POWDER, LYOPHILIZED, FOR SOLUTION INTRAVENOUS at 22:41

## 2024-04-30 RX ADMIN — MONTELUKAST 10 MG: 10 TABLET, FILM COATED ORAL at 21:41

## 2024-04-30 RX ADMIN — ALBUTEROL SULFATE 2.5 MG: 2.5 SOLUTION RESPIRATORY (INHALATION) at 07:11

## 2024-04-30 RX ADMIN — PIPERACILLIN SODIUM AND TAZOBACTAM SODIUM 3.38 G: 3; .375 INJECTION, POWDER, LYOPHILIZED, FOR SOLUTION INTRAVENOUS at 05:51

## 2024-04-30 RX ADMIN — INSULIN LISPRO 5 UNITS: 100 INJECTION, SOLUTION INTRAVENOUS; SUBCUTANEOUS at 11:51

## 2024-04-30 RX ADMIN — ASPIRIN 81 MG: 81 TABLET, COATED ORAL at 08:47

## 2024-04-30 RX ADMIN — CARVEDILOL 25 MG: 25 TABLET, FILM COATED ORAL at 21:41

## 2024-04-30 RX ADMIN — TOBRAMYCIN 300 MG: 300 SOLUTION RESPIRATORY (INHALATION) at 07:11

## 2024-04-30 RX ADMIN — EMPAGLIFLOZIN 10 MG: 10 TABLET, FILM COATED ORAL at 08:47

## 2024-04-30 RX ADMIN — CARVEDILOL 25 MG: 25 TABLET, FILM COATED ORAL at 08:47

## 2024-04-30 RX ADMIN — APIXABAN 5 MG: 5 TABLET, FILM COATED ORAL at 22:41

## 2024-04-30 RX ADMIN — ARFORMOTEROL TARTRATE 15 MCG: 15 SOLUTION RESPIRATORY (INHALATION) at 20:15

## 2024-04-30 RX ADMIN — Medication 10 ML: at 10:18

## 2024-04-30 RX ADMIN — ALBUTEROL SULFATE 2.5 MG: 2.5 SOLUTION RESPIRATORY (INHALATION) at 20:14

## 2024-04-30 RX ADMIN — Medication 10 ML: at 15:44

## 2024-04-30 RX ADMIN — ALBUTEROL SULFATE 2.5 MG: 2.5 SOLUTION RESPIRATORY (INHALATION) at 04:09

## 2024-04-30 RX ADMIN — PIPERACILLIN SODIUM AND TAZOBACTAM SODIUM 3.38 G: 3; .375 INJECTION, POWDER, LYOPHILIZED, FOR SOLUTION INTRAVENOUS at 15:43

## 2024-04-30 RX ADMIN — ALBUTEROL SULFATE 2.5 MG: 2.5 SOLUTION RESPIRATORY (INHALATION) at 16:23

## 2024-04-30 RX ADMIN — ALBUTEROL SULFATE 2.5 MG: 2.5 SOLUTION RESPIRATORY (INHALATION) at 11:36

## 2024-04-30 RX ADMIN — LEVETIRACETAM 500 MG: 500 TABLET, FILM COATED ORAL at 21:41

## 2024-04-30 RX ADMIN — APIXABAN 5 MG: 5 TABLET, FILM COATED ORAL at 11:46

## 2024-04-30 RX ADMIN — INSULIN LISPRO 5 UNITS: 100 INJECTION, SOLUTION INTRAVENOUS; SUBCUTANEOUS at 21:40

## 2024-04-30 RX ADMIN — DULOXETINE HYDROCHLORIDE 30 MG: 30 CAPSULE, DELAYED RELEASE ORAL at 08:46

## 2024-04-30 RX ADMIN — ARFORMOTEROL TARTRATE 15 MCG: 15 SOLUTION RESPIRATORY (INHALATION) at 07:11

## 2024-04-30 RX ADMIN — TOBRAMYCIN 300 MG: 300 SOLUTION RESPIRATORY (INHALATION) at 20:29

## 2024-04-30 NOTE — THERAPY EVALUATION
Acute Care - Physical Therapy Initial Evaluation  [facility] [place]     Patient Name: Preston Wallis  : 1965  MRN: [id number]  Today's Date: 2024      Visit Dx:     ICD-10-CM ICD-9-CM   1. Difficulty walking  R26.2 719.7     Patient Active Problem List   Diagnosis    Pneumonia due to COVID-19 virus    Polyneuropathy    Paroxysmal atrial fibrillation    Obstructive sleep apnea    MRSA pneumonia    Low back pain    Chronic diastolic heart failure    Allergies    COPD exacerbation    Chronic anticoagulation    Benign prostatic hyperplasia    Impaired mobility and endurance    Stage 3a chronic kidney disease    Iron deficiency anemia secondary to inadequate dietary iron intake    Vitamin D deficiency    Class 3 severe obesity with serious comorbidity in adult    Lower extremity edema    Elevated alkaline phosphatase level    Venous insufficiency (chronic) (peripheral)    Tobacco abuse, in remission    History of Pseudomonas pneumonia    Chronic dyspnea    Gastroesophageal reflux disease    Bronchiectasis without complication    ERIC (acute kidney injury)    Altered mental status    Hyperlipidemia    Luetscher's syndrome    Neurogenic bladder    Class 1 obesity    Pneumonia of both lungs due to Pseudomonas species    Seizures    Primary osteoarthritis of left knee    Other constipation    Chronic obstructive pulmonary disease    Type 2 DM with CKD stage 3 and hypertension    Essential hypertension    Stage 3b chronic kidney disease    Annual physical exam    Long-term use of high-risk medication    Personal history of PE (pulmonary embolism)    Encounter for aftercare for healing closed traumatic fracture of left femur    Chronic pain of left knee    Primary osteoarthritis of right knee    Sepsis    Bronchiectasis with acute exacerbation    Bacterial pneumonia    Anemia    Closed fracture of left tibial plateau    Septic joint    Septic arthritis    Skin ulcer of left heel, limited to breakdown of skin    Ulcer of left  "foot, limited to breakdown of skin    Left foot pain    Wheezing    Acute on chronic respiratory failure with hypercapnia    Chronic respiratory failure with hypoxia and hypercapnia    Acute hypoxic on chronic hypercapnic respiratory failure     Past Medical History:   Diagnosis Date    Age-related cognitive decline     Allergic contact dermatitis     Allergies     Anemia     Bedbound     11/2023 \"MY LEG MUSCLES STOPPED WORKING\"    Bronchiectasis with acute lower respiratory infection     Charcot foot due to diabetes mellitus 09/10/2013    Chronic diastolic (congestive) heart failure     Chronic kidney disease     Chronic respiratory failure with hypoxia     Closed supracondylar fracture of femur 01/12/2022    COPD (chronic obstructive pulmonary disease)     Deep vein thrombosis (DVT) of lower extremity associated with air travel 01/13/2023    Dependence on supplemental oxygen     Eczema     Erectile dysfunction     due to organic reasons    Essential (primary) hypertension     Fracture     closed fracture of other tarsal and metatarsal bones    Fracture of proximal humerus 01/13/2023    GERD without esophagitis     High risk medication use     Hypercholesteremia     Hypomagnesemia     Infected stasis ulcer of left lower extremity 01/13/2023    Insomnia     Low back pain     Major depressive disorder     Morbid (severe) obesity due to excess calories     MRSA pneumonia     Muscle weakness     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 without evidence of necrosis     Obstructive sleep apnea (adult) (pediatric)     On home O2     REPORTS WEARING 2L/NC AAT    Other forms of dyspnea     Other long term (current) drug therapy     Other specified noninfective gastroenteritis and colitis     Other spondylosis, lumbar region     Pain in both knees     Paroxysmal atrial fibrillation     Peripheral neuropathy     attributed to type 2 diabetes    Pneumonia, unspecified organism     " Polyneuropathy     Rash and other nonspecific skin eruption     Syncope and collapse     Tachycardia     Tinnitus 01/13/2023    Type 1 diabetes mellitus with diabetic chronic kidney disease     Type 2 diabetes mellitus     Unspecified fall, initial encounter     Urinary retention      Past Surgical History:   Procedure Laterality Date    CHOLECYSTECTOMY      CYSTOSCOPY      FEMUR SURGERY Left     Shravan placed    KNEE SURGERY Left     OTHER SURGICAL HISTORY Left     venous port, REMOVED    PORTACATH PLACEMENT Right     TIBIAL PLATEAU OPEN REDUCTION INTERNAL FIXATION Left 12/22/2023    Procedure: TIBIAL PLATEAU OPEN REDUCTION INTERNAL FIXATION;  Surgeon: Hugo Kline MD;  Location: [facility] MAIN OR;  Service: Orthopedics;  Laterality: Left;    TONSILLECTOMY AND ADENOIDECTOMY       PT Assessment (Last 12 Hours)       PT Evaluation and Treatment       Row Name 04/30/24 1400          Physical Therapy Time and Intention    Subjective Information complains of;pain;weakness (P)   -     Document Type evaluation (P)   -     Mode of Treatment individual therapy;physical therapy (P)   -     Patient Effort adequate (P)   -     Symptoms Noted During/After Treatment increased pain;fatigue (P)   -       Row Name 04/30/24 1400          General Information    Patient Profile Reviewed yes (P)   -     Patient Observations alert;cooperative;agree to therapy (P)   -     Prior Level of Function mod assist:;all household mobility;community mobility (P)   wife assists with hygiene and ADLs as needed  -     Equipment Currently Used at Home wheelchair;slide board (P)   -     Barriers to Rehab none identified (P)   -       Row Name 04/30/24 1400          Living Environment    Current Living Arrangements home (P)   -     Home Accessibility wheelchair accessible (P)   -     People in Home child(hcon), adult;spouse (P)   -     Primary Care Provided by spouse/significant other;self (P)   -       Row Name 04/30/24  1400          Range of Motion (ROM)    Range of Motion bilateral lower extremities (P)   all B LE AROM limited, B hip PROM > AROM but painful, B knee ext AROM limited by tight hamstrings, L ankle not tested due to wound care status, R ankle reduced dorsiflexion, plantar flexed and supinated in resting seated posture.  -MH       Row Name 04/30/24 1400          Strength (Manual Muscle Testing)    Strength (Manual Muscle Testing) bilateral lower extremities (P)   3-/5 for all B LE MMT due to AROM limitations.  -MH       Row Name 04/30/24 1400          Bed Mobility    Bed Mobility supine-sit;sit-supine (P)   -     Supine-Sit [place] (Bed Mobility) moderate assist (50% patient effort) (P)   -     Sit-Supine [place] (Bed Mobility) moderate assist (50% patient effort) (P)   -     Bed Mobility, Safety Issues decreased use of legs for bridging/pushing;decreased use of arms for pushing/pulling;impaired trunk control for bed mobility (P)   -     Assistive Device (Bed Mobility) bed rails;head of bed elevated (P)   -MH       Row Name 04/30/24 1400          Transfers    Comment, (Transfers) deferred, non ambulatory with slide board transfers to W/C at baseline (P)   -MH       Row Name 04/30/24 1400          Gait/Stairs (Locomotion)    Patient was able to Ambulate no, other medical factors prevent ambulation (P)   -     Reason Patient was unable to Ambulate Non-Ambulatory at Baseline (P)   -MH       Row Name 04/30/24 1400          Safety Issues, Functional Mobility    Impairments Affecting Function (Mobility) endurance/activity tolerance;pain;postural/trunk control;strength (P)   -MH       Row Name 04/30/24 1400          Balance    Balance Assessment sitting static balance;sitting dynamic balance (P)   -     Static Sitting Balance contact guard (P)   -     Dynamic Sitting Balance contact guard (P)   -     Position, Sitting Balance sitting edge of bed;supported (P)   -     Comment, Balance Pt with  reduced balance during dynamic sitting balance, demonstrating posterior lean. (P)   -MH       Row Name             Wound 12/22/23 1322 Left posterior heel Pressure Injury    Wound - Properties Group Placement Date: 12/22/23  -LH Placement Time: 1322  -LH Side: Left  -LH Orientation: posterior  -LH Location: heel  -LH Primary Wound Type: Pressure inj  -LH    Retired Wound - Properties Group Placement Date: 12/22/23  -LH Placement Time: 1322  -LH Side: Left  -LH Orientation: posterior  -LH Location: heel  -LH Primary Wound Type: Pressure inj  -LH    Retired Wound - Properties Group Date first assessed: 12/22/23  -LH Time first assessed: 1322  -LH Side: Left  -LH Location: heel  -LH Primary Wound Type: Pressure inj  -LH      Row Name             Wound 02/16/24 1700 Left posterior foot Pressure Injury    Wound - Properties Group Placement Date: 02/16/24  -AG Placement Time: 1700  -AG Side: Left  -AG Orientation: posterior  -AG Location: foot  -AG Primary Wound Type: Pressure inj  -AG    Retired Wound - Properties Group Placement Date: 02/16/24  -AG Placement Time: 1700  -AG Side: Left  -AG Orientation: posterior  -AG Location: foot  -AG Primary Wound Type: Pressure inj  -AG    Retired Wound - Properties Group Date first assessed: 02/16/24  -AG Time first assessed: 1700  -AG Side: Left  -AG Location: foot  -AG Primary Wound Type: Pressure inj  -AG      Row Name             Wound 04/29/24 2355 Left posterior other (see comments) Pressure Injury    Wound - Properties Group Placement Date: 04/29/24  -ROSE MARY Placement Time: 2355 -ROSE MARY Present on Original Admission: Y  -ROSE MARY Side: Left  -ROSE MARY Orientation: posterior  -ROSE MARY Location: other (see comments)  -ROSE MARY, Left Buttock  Primary Wound Type: Pressure inj  -ROSE MARY Additional Comments: no bleeding observed, cleansed with normal saline and mepilex applied  -ROSE MARY Wound Outcome: Unknown  -ROSE MARY    Retired Wound - Properties Group Placement Date: 04/29/24  -ROSE MARY Placement Time: 2355 -ROSE MARY Present on  Original Admission: Y  -ROSE MARY Side: Left  -ROSE MARY Orientation: posterior  -ROSE MARY Location: other (see comments)  -ROSE MARY, Left Buttock  Primary Wound Type: Pressure inj  -ROSE MARY Additional Comments: no bleeding observed, cleansed with normal saline and mepilex applied  -ROSE MARY Wound Outcome: Unknown  -ROSE MARY    Retired Wound - Properties Group Date first assessed: 04/29/24  -ROSE MARY Time first assessed: 2355  -ROSE MARY Present on Original Admission: Y  -ROSE MARY Side: Left  -ROSE MARY Location: other (see comments)  -ROSE MARY, Left Buttock  Primary Wound Type: Pressure inj  -ROSE MARY Additional Comments: no bleeding observed, cleansed with normal saline and mepilex applied  -ROSE MARY Wound Outcome: Unknown  -ROSE MARY      Row Name 04/30/24 1400          Plan of Care Review    Plan of Care Reviewed With patient (P)   -     Progress no change (P)   -     Outcome Evaluation Pt presents to PT with deficits associated with acute hypoxic on chronics hypercapnic respiratory failure. Skilled services needed at this time. Plan to DC to sub acute facility. (P)   -       Row Name 04/30/24 1400          Positioning and Restraints    Pre-Treatment Position in bed (P)   -     Post Treatment Position bed (P)   -     In Bed fowlers;call light within reach;notified nsg;LLE elevated (P)   -       Row Name 04/30/24 1400          Therapy Assessment/Plan (PT)    Patient/Family Therapy Goals Statement (PT) independent with transfers (P)   -     Rehab Potential (PT) good, to achieve stated therapy goals (P)   -     Criteria for Skilled Interventions Met (PT) meets criteria (P)   -     Therapy Frequency (PT) daily (P)   -     Predicted Duration of Therapy Intervention (PT) 10 days (P)   -     Problem List (PT) problems related to;balance;mobility;range of motion (ROM);strength;pain;postural control (P)   -     Activity Limitations Related to Problem List (PT) unable to ambulate safely;unable to transfer safely (P)   -       Row Name 04/30/24 1400          PT Evaluation Complexity     History, PT Evaluation Complexity 1-2 personal factors and/or comorbidities (P)   -MH     Examination of Body Systems (PT Eval Complexity) total of 3 or more elements (P)   -MH     Clinical Presentation (PT Evaluation Complexity) stable (P)   -MH     Clinical Decision Making (PT Evaluation Complexity) low complexity (P)   -MH     Overall Complexity (PT Evaluation Complexity) low complexity (P)   -       Row Name 04/30/24 1400          Therapy Plan Review/Discharge Plan (PT)    Therapy Plan Review (PT) evaluation/treatment results reviewed (P)   -       Row Name 04/30/24 1400          Physical Therapy Goals    Bed Mobility Goal Selection (PT) bed mobility, PT goal 1 (P)   -     Transfer Goal Selection (PT) transfer, PT goal 1 (P)   -       Row Name 04/30/24 1400          Bed Mobility Goal 1 (PT)    Activity/Assistive Device (Bed Mobility Goal 1, PT) bed mobility activities, all (P)   -     [place] Level/Cues Needed (Bed Mobility Goal 1, PT) contact guard required (P)   -MH     Time Frame (Bed Mobility Goal 1, PT) 10 days (P)   -MH     Progress/Outcomes (Bed Mobility Goal 1, PT) new goal (P)   -       Row Name 04/30/24 1400          Transfer Goal 1 (PT)    Activity/Assistive Device (Transfer Goal 1, PT) wheelchair transfer (P)   -MH     [place] Level/Cues Needed (Transfer Goal 1, PT) standby assist (P)   -MH     Time Frame (Transfer Goal 1, PT) 10 days (P)   -MH     Progress/Outcome (Transfer Goal 1, PT) new goal (P)   -               User Key  (r) = Recorded By, (t) = Taken By, (c) = Cosigned By      Initials Name Provider Type    Carmen Wise RN Registered Nurse    Cindy Lorenzo RN Registered Nurse    Pawan Quispe, PT Student PT Student    Lino Angelo, RN Registered Nurse                    Physical Therapy Education       Title: PT OT SLP Therapies (Done)       Topic: Physical Therapy (Done)       Point: Mobility training (Done)       Learning Progress Summary              Patient Acceptance, E,TB, VU by  at 4/30/2024 [id number]                                         User Key       Initials Effective Dates Name Provider Type Discipline     10/12/23 -  Pawan Hernandez, PT Student PT Student PT                  PT Recommendation and Plan  Anticipated Discharge Disposition (PT): (P) sub acute care setting  Planned Therapy Interventions (PT): (P) balance training, bed mobility training, gait training, patient/family education, stair training, strengthening, transfer training  Therapy Frequency (PT): (P) daily  Plan of Care Reviewed With: (P) patient  Progress: (P) no change  Outcome Evaluation: (P) Pt presents to PT with deficits associated with acute hypoxic on chronics hypercapnic respiratory failure. Skilled services needed at this time. Plan to DC to sub acute facility.   Outcome Measures       Row Name 04/30/24 1400             How much help from another person do you currently need...    Turning from your back to your side while in flat bed without using bedrails? 3 (P)   -MH      Moving from lying on back to sitting on the side of a flat bed without bedrails? 3 (P)   -MH      Moving to and from a bed to a chair (including a wheelchair)? 1 (P)   -MH      Standing up from a chair using your arms (e.g., wheelchair, bedside chair)? 1 (P)   -MH      Climbing 3-5 steps with a railing? 1 (P)   -MH      To walk in hospital room? 1 (P)   -      AM-PAC 6 Clicks Score (PT) 10 (P)   -      Highest Level of Mobility Goal 4 --> Transfer to chair/commode (P)   -                User Key  (r) = Recorded By, (t) = Taken By, (c) = Cosigned By      Initials Name Provider Type     Pawan Hernandez, PT Student PT Student                     Time Calculation:    PT Charges       Row Name 04/30/24 2854             Time Calculation    PT Received On 04/30/24 (P)   -      PT Goal Re-Cert Due Date 05/09/24 (P)   -         Untimed Charges    PT Eval/Re-eval Minutes 31 (P)   -         Total  Minutes    Untimed Charges Total Minutes 31 (P)   -       Total Minutes 31 (P)   -                User Key  (r) = Recorded By, (t) = Taken By, (c) = Cosigned By      Initials Name Provider Type     Pawan Hernandez, PT Student PT Student                  Therapy Charges for Today       Code Description Service Date Service Provider Modifiers Qty    52879915493 HC PT EVAL LOW COMPLEXITY 3 4/30/2024 Pawan Hernandez, PT Student GP 1            PT G-Codes  AM-PAC 6 Clicks Score (PT): (P) 10    Pawan Hernandez PT Student  4/30/2024

## 2024-04-30 NOTE — PLAN OF CARE
Goal Outcome Evaluation:  Plan of Care Reviewed With: patient        Progress: improving  Outcome Evaluation: Patient on bipap all night removed this morning and placed on nasal cannula at 2lpm.

## 2024-04-30 NOTE — PROGRESS NOTES
RT EQUIPMENT DEVICE RELATED - SKIN ASSESSMENT    RT Medical Equipment/Device:     NIV Mask:  Under-the-nose   size:  B    Skin Assessment:      Cheek:  Intact  Neck:  Intact    Device Skin Pressure Protection:  Pressure points protected    Nurse Notification:  Kaylin Luna, RRT

## 2024-04-30 NOTE — PROGRESS NOTES
Pulmonary / Critical Care Progress Note      Patient Name: Preston Wallis  : 1965  MRN: [id number]  Primary Care Physician:  Kimmy Riley MD  Date of admission: 2024    Subjective   Subjective   Follow-up for hypoxic and hypercapnic respiratory failure, severe COPD, obesity hypoventilation syndrome, noncompliance of NIV    Over past 24 hours: Out of ICU.  Remains on Eliquis.  Continues Brovana, Pulmicort, and MOIZ nebs.  Remains on Zosyn course.    No acute events overnight.  Wore NIPPV    This morning,   On 2 L nasal cannula  Lying in bed  Reports feeling well today  Denies any chest pain or chest tightness  Denies cough  No fever or chills  Diuresing well  -1.7 L urine output    Objective   Objective     Vitals:   Temp:  [97.5 °F (36.4 °C)-99.3 °F (37.4 °C)] 97.9 °F (36.6 °C)  Heart Rate:  [74-86] 78  Resp:  [14-27] 18  BP: (102-155)/(50-96) 154/52  Flow (L/min):  [2] 2  Physical Exam   Vital Signs Reviewed   General:  WDWN, Alert, NAD.  Chronically ill-appearing male, lying in bed  HEENT:  PERRL, EOMI.  OP, nares clear, no sinus tenderness  Neck:  Supple, no JVD, no thyromegaly  Chest: Improved aeration with bibasilar crackles and rhonchi, diminished right chest, equal rise and fall bilaterally, on 2 L  CV: RRR, no MGR, pulses 2+, equal.  Abd:  Soft, NT, ND, + BS, no HSM, obese  EXT:  no clubbing, no cyanosis, BLE edema  Neuro:  A&Ox3, CN grossly intact, no focal deficits.  Skin: No rashes or lesions noted      Result Review    Result Review:  I have personally reviewed the results from the time of this admission to 2024 07:28 EDT and agree with these findings:  [x]  Laboratory  [x]  Microbiology  [x]  Radiology  []  EKG/Telemetry   []  Cardiology/Vascular   []  Pathology  []  Old records  []  Other:  Most notable findings include:         Lab 24  0743 24  0603 24  0140 24  0016   WBC 9.41  --   --  15.37*   HEMOGLOBIN 6.8*  --   --  7.8*   HEMATOCRIT 22.6*  --   --   26.6*   PLATELETS 217  --   --  293   SODIUM 151* 138  --   --    SODIUM, ARTERIAL  --   --  133.8*  --    POTASSIUM 3.1* 4.7  --   --    CHLORIDE 100 94*  --   --    CO2 30.1* 37.1*  --   --    BUN 38* 46*  --   --    CREATININE 1.90* 2.45*  --   --    GLUCOSE 97 157*  --   --    GLUCOSE, ARTERIAL  --   --  128*  --    CALCIUM 6.8* 8.6  --   --    PHOSPHORUS 2.5  --   --   --        Assessment & Plan   Assessment / Plan     Active Hospital Problems:  Active Hospital Problems    Diagnosis     **Acute hypoxic on chronic hypercapnic respiratory failure      Impression:   Acute on chronic hypoxemic and hypercapnic respiratory failure requiring NIPPV  Severe COPD without exacerbation, FEV1 26%  Obesity hypoventilation syndrome  Acute on chronic decompensated congestive diastolic heart failure  Acute cardiogenic pulmonary edema  Recurrent Pseudomonas pneumonia, resistant to Levaquin  Altered mental status  CO2 narcosis  Anasarca  Anemia  CKD  Hypocalcemia  Medical noncompliance of NIV  History of MILADY  Tobacco abuse in remission    Plan:   -On 2 L nasal cannula.  Continue to maintain SpO2 greater than 90%.  Patient wears 2-3 at baseline   -Encourage NIPPV as needed with naps and at nighttime   -Patient s/p thoracentesis on 4/29 with 1 L removed.  Pleural fluid transudative per lights criteria likely consistent with CHF   -Continue albuterol, Brovana, Pulmicort, and MOIZ nebs   -Continue Zosyn course for total of 7 days for pneumonia   -Continue Eliquis for A-fib   -Continue Jardiance 10 mg oral daily   -RT  on board.  Appreciate assistance     Last admission we arranged for NIPPV and chest vest.  Can use home NIPPV and can use chest vest here per protocol.  Per compliance reports he has been noncompliant since he has been discharged          DVT prophylaxis:  Medical DVT prophylaxis orders are present.    CODE STATUS:   Code Status (Patient has no pulse and is not breathing): CPR (Attempt to  Resuscitate)  Medical Interventions (Patient has pulse or is breathing): Full Support    I personally reviewed pertinent labs, imaging and provider notes. Discussed with bedside nurse and will discuss with primary service.     Electronically signed by CON Sampson, 04/30/24, 11:14 AM EDT.    This visit was performed by BOTH a physician and an APC. I personally evaluated and examined the patient. I performed all aspects of MDM as documented. , I have reviewed and confirmed the accuracy of the patient's history as documented in this note., and I have reexamined the patient and the results are consistent with the previously documented exam. I have updated the documentation as necessary.     Electronically signed by Martín Rivera MD, 04/30/24, 12:53 PM EDT.   Electronically signed by Martín Rivera MD, 4/30/2024, 07:28 EDT.

## 2024-04-30 NOTE — PROGRESS NOTES
RT EQUIPMENT DEVICE RELATED - SKIN ASSESSMENT    RT Medical Equipment/Device:     NIV Mask:  Under-the-nose   size:  b    Skin Assessment:      Nose:  Intact    Device Skin Pressure Protection:  Pressure points protected    Nurse Notification:  Kaylin Mcelroy RRT

## 2024-04-30 NOTE — PLAN OF CARE
Goal Outcome Evaluation:  Plan of Care Reviewed With: patient        Progress: no change  Outcome Evaluation: Patient has been wearing 2L nasal cannula during the day and when he naps and sleeps wears the Bipap 18/8, rate-6, 30%.

## 2024-04-30 NOTE — PLAN OF CARE
Goal Outcome Evaluation:  Plan of Care Reviewed With: (P) patient        Progress: (P) no change  Outcome Evaluation: (P) Pt presents to PT with deficits associated with acute hypoxic on chronics hypercapnic respiratory failure. Skilled services needed at this time. Plan to DC to sub acute facility.      Anticipated Discharge Disposition (PT): (P) sub acute care setting

## 2024-04-30 NOTE — PLAN OF CARE
Goal Outcome Evaluation:    AAO X4. TOLERATING DIET, RA-1L/NC (WEARS 2.5L/NC AT HOME), & BEDBOUND BASELINE; REPORTS NOT WALKING SINCE 11/2023. CM: NSR.     1 UNIT PRBC TODAY.

## 2024-04-30 NOTE — PROGRESS NOTES
[facility]   Hospitalist Progress Note  Date: 2024  Patient Name: Preston Wallis  : 1965  MRN: [id number]  Date of admission: 2024      Subjective   Subjective     Chief Complaint: Lethargy, increased blood sugar and shortness of air    Summary:   Direct transfer from [facility]  ED.  Patient presented to outside ED due to lethargy and increased blood glucose of 515.  Although lethargic but  was responding appropriately.  Patient has past medical history of hypercapnic and hypoxemic respiratory failure, obstructive sleep apnea, A-fib, seizure disorder, CKD 3B, diabetes mellitus, anemia, recent CABG, BPH with self cath, and diabetic foot ulcers.  Patient was recently discharged on  from our hospital because of Pseudomonas pneumonia on Levaquin.  Pleural fluid  from thoracentesis was transudative.  Workup at the outside ED showed O2 sat to be 76% on 2 L.  Other vital signs stable patient is afebrile.  Initial ABG showed pH of 7.19 pCO2 of 103 and pO2 45.  With BiPAP 40% FiO2 his ABG showed pH of 7.25 pCO2 91 and pO2 of 62.  Patient is more alert, sitting and talking.  Chest x-ray showed worsening of right more than left effusion with possible right lower lobe pneumonia.  His kidney function has gotten worse with a creatinine of 2.4.  Recent creatinine was 1.7.  White cell count is 15,000.  Repeat blood sugar is better.  Patient given IV Vanco and cefepime at the outside facility and hospitalist has been called to admit him here.  Intensivist was also consulted by outside facility for ICU admission.  Patient had right thoracentesis about 1 L reddish tinged fluid on .  Patient Pseudomonas on some sputum culture was not sensitive to Levaquin.    Interval Followup:   Patient on 2 L nasal cannula.  Used BiPAP all night tolerating it well  Other vital signs stable  Awake alert oriented x 3.  Dropped his hemoglobin.  Denies any bleeding or black-colored stools.  Sodium level up.   Creatinine improving.  Does have cough productive of green mucus.  No chest pain.  Patient has not been compliant with his home astral as per RT case management evaluation.  Good urine output -2.5 L  Review of Systems   All systems were reviewed and negative except for: Summary and interval follow-up    Objective   Objective     Vitals:   Temp:  [97.5 °F (36.4 °C)-99 °F (37.2 °C)] 97.7 °F (36.5 °C)  Heart Rate:  [74-85] 76  Resp:  [16-20] 16  BP: (130-154)/(44-60) 137/44  Flow (L/min):  [1-2] 1  Physical Exam    Constitutional: Awake, alert, no acute distress   Eyes: Pupils equal, sclerae anicteric, no conjunctival injection   HENT: NCAT, mucous membranes moist   Neck: Supple, no thyromegaly, no lymphadenopathy, trachea midline   Respiratory: Rhonchi and decreased to auscultation bilaterally, nonlabored respirations    Cardiovascular: RRR, no murmurs, rubs, or gallops, palpable pedal pulses bilaterally   Gastrointestinal: Positive bowel sounds, soft, nontender, nondistended   Musculoskeletal: +2 bilateral ankle edema, no clubbing or cyanosis to extremities   Psychiatric: Appropriate affect, cooperative   Neurologic: Oriented x 3, strength symmetric in all extremities, Cranial Nerves grossly intact to confrontation, speech clear   Skin: No rashes.  Left foot wound dressed    Result Review    Result Review:  I have personally reviewed the results for the past 24 hours and agree with these findings:  [x]  Laboratory  [x]  Microbiology  [x]  Radiology  []  EKG/Telemetry   []  Cardiology/Vascular   []  Pathology  [x]  Old records  [x]  Other: Medications    Assessment & Plan   Assessment / Plan     Assessment:  Healthcare associated pneumonia likely Pseudomonas right lower lobe.  Right parapneumonic effusion.  S/p thoracentesis 1 L red tinged fluid drained April 29 by intensivist.  Acute on chronic hypoxemic hypercapnic respiratory failure.  Resolved.  Obstructive sleep apnea on home astral.  Noncompliance with home  NIPPV.  History of bronchiectasis.  Acute on chronic diastolic CHF.  Right more than left pleural effusion  CKD 3B baseline creatinine of 2.1.  Improving  S/p port placement.  Acute on chronic anemia stable.  No evidence of bleeding.  S/p 1 unit packed RBC  Diabetes mellitus.  Diabetic foot wound.  Paroxysmal A-fib on Eliquis.  Hyper natremia.  Free water deficit       Plan:  Continue supplemental oxygen keep sats more than 90%  NIPPV as needed and with naps.  Monitor ABG.  Patient to use his home astral  IV Zosyn.  Sputum culture pending  DC IV Bumex.  Home HCTZ resumed  Monitor sodium level  Monitor H&H.  Transfuse if hemoglobin less than 7  Nebulizer treatment  Bronchodilator and bronchopulmonary team protocol.  Chest vest therapy, incentive spirometry, MetaNeb and flutter valve.  Check CT of the chest.  Appreciate pulmonary critical input.  S/p thoracentesis.  Await culture data.  Appreciate RT case management.  Sliding-scale insulin.  Continue home Cymbalta, Farxiga Keppra, Lipitor, Coreg and aspirin.  Carb consistent heart healthy diet.  MRSA PCR negative.  Continue Doyle catheter.  PT OT.  Continue telemetry  Discussed plan with RN.    DVT prophylaxis:  Medical DVT prophylaxis orders are present.        CODE STATUS:   Code Status (Patient has no pulse and is not breathing): CPR (Attempt to Resuscitate)  Medical Interventions (Patient has pulse or is breathing): Full Support      Part of this note may be an electronic transcription/translation of spoken language to printed text using the Dragon Dictation System.       Electronically signed by Gatito Higginbotham MD, 04/30/24, 4:04 PM EDT.

## 2024-04-30 NOTE — CONSULTS
Met with patient and wife at bedside. Patient takes insulin as prescribed including sliding scale insulin at mealtimes. Patient's most recent A1c was 8.2% in December 2023 with an estimated average glucose of 189 mg/dl. Patient follows up with [facility] P[facility] June [facility]. Discussed potentially increasing SSI to lower blood sugars.     Patient has a Freestyle Bethany 3 at home but hasn't put it back on since his discharge from the hospital last week. Patient and wife have no questions at this time. Will continue to support and assist as needed.

## 2024-04-30 NOTE — PROGRESS NOTES
Yes I am okay with orders.  I believe time he is back in the hospital at this time though.  Dr. Riley

## 2024-05-01 LAB
ALBUMIN SERPL-MCNC: 2.8 G/DL (ref 3.5–5.2)
ANION GAP SERPL CALCULATED.3IONS-SCNC: 7.6 MMOL/L (ref 5–15)
BASOPHILS # BLD AUTO: 0.04 10*3/MM3 (ref 0–0.2)
BASOPHILS NFR BLD AUTO: 0.3 % (ref 0–1.5)
BH BB BLOOD EXPIRATION DATE: NORMAL
BH BB BLOOD TYPE BARCODE: 9500
BH BB DISPENSE STATUS: NORMAL
BH BB PRODUCT CODE: NORMAL
BH BB UNIT NUMBER: NORMAL
BUN SERPL-MCNC: 39 MG/DL (ref 6–20)
BUN/CREAT SERPL: 18.8 (ref 7–25)
CALCIUM SPEC-SCNC: 8.9 MG/DL (ref 8.6–10.5)
CHLORIDE SERPL-SCNC: 96 MMOL/L (ref 98–107)
CO2 SERPL-SCNC: 37.4 MMOL/L (ref 22–29)
CREAT SERPL-MCNC: 2.07 MG/DL (ref 0.76–1.27)
CROSSMATCH INTERPRETATION: NORMAL
DEPRECATED RDW RBC AUTO: 48.8 FL (ref 37–54)
EGFRCR SERPLBLD CKD-EPI 2021: 36.4 ML/MIN/1.73
EOSINOPHIL # BLD AUTO: 0.35 10*3/MM3 (ref 0–0.4)
EOSINOPHIL NFR BLD AUTO: 2.8 % (ref 0.3–6.2)
ERYTHROCYTE [DISTWIDTH] IN BLOOD BY AUTOMATED COUNT: 14.8 % (ref 12.3–15.4)
GLUCOSE BLDC GLUCOMTR-MCNC: 126 MG/DL (ref 70–99)
GLUCOSE BLDC GLUCOMTR-MCNC: 160 MG/DL (ref 70–99)
GLUCOSE BLDC GLUCOMTR-MCNC: 171 MG/DL (ref 70–99)
GLUCOSE BLDC GLUCOMTR-MCNC: 99 MG/DL (ref 70–99)
GLUCOSE SERPL-MCNC: 159 MG/DL (ref 65–99)
HCT VFR BLD AUTO: 25.9 % (ref 37.5–51)
HGB BLD-MCNC: 7.9 G/DL (ref 13–17.7)
IMM GRANULOCYTES # BLD AUTO: 0.09 10*3/MM3 (ref 0–0.05)
IMM GRANULOCYTES NFR BLD AUTO: 0.7 % (ref 0–0.5)
IRON 24H UR-MRATE: 46 MCG/DL (ref 59–158)
IRON SATN MFR SERPL: 23 % (ref 20–50)
LYMPHOCYTES # BLD AUTO: 1.18 10*3/MM3 (ref 0.7–3.1)
LYMPHOCYTES NFR BLD AUTO: 9.6 % (ref 19.6–45.3)
MCH RBC QN AUTO: 27.6 PG (ref 26.6–33)
MCHC RBC AUTO-ENTMCNC: 30.5 G/DL (ref 31.5–35.7)
MCV RBC AUTO: 90.6 FL (ref 79–97)
MONOCYTES # BLD AUTO: 1.52 10*3/MM3 (ref 0.1–0.9)
MONOCYTES NFR BLD AUTO: 12.3 % (ref 5–12)
NEUTROPHILS NFR BLD AUTO: 74.3 % (ref 42.7–76)
NEUTROPHILS NFR BLD AUTO: 9.15 10*3/MM3 (ref 1.7–7)
NRBC BLD AUTO-RTO: 0 /100 WBC (ref 0–0.2)
PHOSPHATE SERPL-MCNC: 3.6 MG/DL (ref 2.5–4.5)
PLATELET # BLD AUTO: 255 10*3/MM3 (ref 140–450)
PMV BLD AUTO: 8.4 FL (ref 6–12)
POTASSIUM SERPL-SCNC: 3.7 MMOL/L (ref 3.5–5.2)
RBC # BLD AUTO: 2.86 10*6/MM3 (ref 4.14–5.8)
SODIUM SERPL-SCNC: 141 MMOL/L (ref 136–145)
TIBC SERPL-MCNC: 203 MCG/DL (ref 298–536)
TRANSFERRIN SERPL-MCNC: 136 MG/DL (ref 200–360)
UNIT  ABO: NORMAL
UNIT  RH: NORMAL
WBC NRBC COR # BLD AUTO: 12.33 10*3/MM3 (ref 3.4–10.8)

## 2024-05-01 PROCEDURE — 82948 REAGENT STRIP/BLOOD GLUCOSE: CPT

## 2024-05-01 PROCEDURE — 82948 REAGENT STRIP/BLOOD GLUCOSE: CPT | Performed by: INTERNAL MEDICINE

## 2024-05-01 PROCEDURE — 80069 RENAL FUNCTION PANEL: CPT | Performed by: INTERNAL MEDICINE

## 2024-05-01 PROCEDURE — 94660 CPAP INITIATION&MGMT: CPT

## 2024-05-01 PROCEDURE — 85025 COMPLETE CBC W/AUTO DIFF WBC: CPT | Performed by: INTERNAL MEDICINE

## 2024-05-01 PROCEDURE — 94799 UNLISTED PULMONARY SVC/PX: CPT

## 2024-05-01 PROCEDURE — 25010000002 PIPERACILLIN SOD-TAZOBACTAM PER 1 G: Performed by: INTERNAL MEDICINE

## 2024-05-01 PROCEDURE — 25010000002 ACETAZOLAMIDE PER 500 MG: Performed by: INTERNAL MEDICINE

## 2024-05-01 PROCEDURE — 97165 OT EVAL LOW COMPLEX 30 MIN: CPT

## 2024-05-01 PROCEDURE — 99233 SBSQ HOSP IP/OBS HIGH 50: CPT | Performed by: INTERNAL MEDICINE

## 2024-05-01 PROCEDURE — 63710000001 INSULIN LISPRO (HUMAN) PER 5 UNITS: Performed by: INTERNAL MEDICINE

## 2024-05-01 PROCEDURE — 83540 ASSAY OF IRON: CPT | Performed by: INTERNAL MEDICINE

## 2024-05-01 PROCEDURE — 84466 ASSAY OF TRANSFERRIN: CPT | Performed by: INTERNAL MEDICINE

## 2024-05-01 PROCEDURE — 94664 DEMO&/EVAL PT USE INHALER: CPT

## 2024-05-01 PROCEDURE — 63710000001 INSULIN DETEMIR PER 5 UNITS: Performed by: INTERNAL MEDICINE

## 2024-05-01 RX ORDER — BUMETANIDE 1 MG/1
2 TABLET ORAL 2 TIMES DAILY
Status: DISCONTINUED | OUTPATIENT
Start: 2024-05-01 | End: 2024-05-03 | Stop reason: HOSPADM

## 2024-05-01 RX ORDER — ACETAZOLAMIDE 500 MG/5ML
500 INJECTION, POWDER, LYOPHILIZED, FOR SOLUTION INTRAVENOUS ONCE
Status: COMPLETED | OUTPATIENT
Start: 2024-05-01 | End: 2024-05-01

## 2024-05-01 RX ORDER — ALBUTEROL SULFATE 2.5 MG/3ML
2.5 SOLUTION RESPIRATORY (INHALATION) EVERY 6 HOURS PRN
Status: DISCONTINUED | OUTPATIENT
Start: 2024-05-01 | End: 2024-05-03 | Stop reason: HOSPADM

## 2024-05-01 RX ADMIN — ALBUTEROL SULFATE 2.5 MG: 2.5 SOLUTION RESPIRATORY (INHALATION) at 00:37

## 2024-05-01 RX ADMIN — TOBRAMYCIN 300 MG: 300 SOLUTION RESPIRATORY (INHALATION) at 19:23

## 2024-05-01 RX ADMIN — CARVEDILOL 25 MG: 25 TABLET, FILM COATED ORAL at 09:05

## 2024-05-01 RX ADMIN — Medication 1 MG: at 22:37

## 2024-05-01 RX ADMIN — BUMETANIDE 2 MG: 1 TABLET ORAL at 11:44

## 2024-05-01 RX ADMIN — PIPERACILLIN SODIUM AND TAZOBACTAM SODIUM 3.38 G: 3; .375 INJECTION, POWDER, LYOPHILIZED, FOR SOLUTION INTRAVENOUS at 05:49

## 2024-05-01 RX ADMIN — ALBUTEROL SULFATE 2.5 MG: 2.5 SOLUTION RESPIRATORY (INHALATION) at 06:48

## 2024-05-01 RX ADMIN — BUDESONIDE 0.5 MG: 0.5 INHALANT RESPIRATORY (INHALATION) at 19:23

## 2024-05-01 RX ADMIN — ARFORMOTEROL TARTRATE 15 MCG: 15 SOLUTION RESPIRATORY (INHALATION) at 19:23

## 2024-05-01 RX ADMIN — LEVETIRACETAM 500 MG: 500 TABLET, FILM COATED ORAL at 09:05

## 2024-05-01 RX ADMIN — APIXABAN 5 MG: 5 TABLET, FILM COATED ORAL at 22:38

## 2024-05-01 RX ADMIN — EMPAGLIFLOZIN 10 MG: 10 TABLET, FILM COATED ORAL at 09:04

## 2024-05-01 RX ADMIN — FERROUS SULFATE TAB 325 MG (65 MG ELEMENTAL FE) 325 MG: 325 (65 FE) TAB at 09:04

## 2024-05-01 RX ADMIN — INSULIN LISPRO 3 UNITS: 100 INJECTION, SOLUTION INTRAVENOUS; SUBCUTANEOUS at 22:36

## 2024-05-01 RX ADMIN — Medication 10 ML: at 09:33

## 2024-05-01 RX ADMIN — INSULIN LISPRO 3 UNITS: 100 INJECTION, SOLUTION INTRAVENOUS; SUBCUTANEOUS at 17:06

## 2024-05-01 RX ADMIN — TOBRAMYCIN 300 MG: 300 SOLUTION RESPIRATORY (INHALATION) at 06:48

## 2024-05-01 RX ADMIN — Medication 10 ML: at 22:39

## 2024-05-01 RX ADMIN — ACETAZOLAMIDE 500 MG: 500 INJECTION, POWDER, LYOPHILIZED, FOR SOLUTION INTRAVENOUS at 11:44

## 2024-05-01 RX ADMIN — MONTELUKAST 10 MG: 10 TABLET, FILM COATED ORAL at 22:37

## 2024-05-01 RX ADMIN — DULOXETINE HYDROCHLORIDE 30 MG: 30 CAPSULE, DELAYED RELEASE ORAL at 09:04

## 2024-05-01 RX ADMIN — PIPERACILLIN SODIUM AND TAZOBACTAM SODIUM 3.38 G: 3; .375 INJECTION, POWDER, LYOPHILIZED, FOR SOLUTION INTRAVENOUS at 14:14

## 2024-05-01 RX ADMIN — ASPIRIN 81 MG: 81 TABLET, COATED ORAL at 09:04

## 2024-05-01 RX ADMIN — ALBUTEROL SULFATE 2.5 MG: 2.5 SOLUTION RESPIRATORY (INHALATION) at 03:18

## 2024-05-01 RX ADMIN — ARFORMOTEROL TARTRATE 15 MCG: 15 SOLUTION RESPIRATORY (INHALATION) at 06:48

## 2024-05-01 RX ADMIN — ATORVASTATIN CALCIUM 20 MG: 20 TABLET, FILM COATED ORAL at 22:38

## 2024-05-01 RX ADMIN — APIXABAN 5 MG: 5 TABLET, FILM COATED ORAL at 09:04

## 2024-05-01 RX ADMIN — BUDESONIDE 0.5 MG: 0.5 INHALANT RESPIRATORY (INHALATION) at 06:48

## 2024-05-01 RX ADMIN — HYDROCHLOROTHIAZIDE 25 MG: 25 TABLET ORAL at 09:04

## 2024-05-01 RX ADMIN — LEVETIRACETAM 500 MG: 500 TABLET, FILM COATED ORAL at 22:38

## 2024-05-01 RX ADMIN — FAMOTIDINE 40 MG: 20 TABLET ORAL at 09:04

## 2024-05-01 RX ADMIN — INSULIN DETEMIR 10 UNITS: 100 INJECTION, SOLUTION SUBCUTANEOUS at 22:35

## 2024-05-01 RX ADMIN — Medication 2000 UNITS: at 09:05

## 2024-05-01 RX ADMIN — FINASTERIDE 5 MG: 5 TABLET, FILM COATED ORAL at 21:41

## 2024-05-01 NOTE — PLAN OF CARE
Goal Outcome Evaluation:  Plan of Care Reviewed With: patient        Progress: no change (first session: evaluation)  Outcome Evaluation: Patient presents with limitations of balance, strength and endurance/ activity tolerance which are impacting ADL/ transfer independence. Skilled OT is indicated to remediate/ compensate for deficits to maximize independence and safety with functional tasks.      Anticipated Discharge Disposition (OT): sub acute care setting

## 2024-05-01 NOTE — PLAN OF CARE
Goal Outcome Evaluation:  Plan of Care Reviewed With: patient        Progress: no change  Outcome Evaluation: Patient currently on 1L nasal cannula tolerating fine. Bipap is on standby if needed. Will continue to monitor.

## 2024-05-01 NOTE — PROGRESS NOTES
RT EQUIPMENT DEVICE RELATED - SKIN ASSESSMENT    RT Medical Equipment/Device:     NIV Mask:  Under-the-nose   size:  b    Skin Assessment:      Cheek:  Intact  Chin:  Intact  Nose:  Intact    Device Skin Pressure Protection:  Pressure points protected    Nurse Notification:  Kaylin Rodriguez RRT

## 2024-05-01 NOTE — PLAN OF CARE
Goal Outcome Evaluation:  Plan of Care Reviewed With: patient        Progress: improving      PATIENT RECEIVED 1 UNIT PRBC-HGB 7.9 AFTER, VSS, NO ACUTE EVENTS

## 2024-05-01 NOTE — PROGRESS NOTES
RT EQUIPMENT DEVICE RELATED - SKIN ASSESSMENT    RT Medical Equipment/Device:     NIV Mask:  Under-the-nose   size:  b    Skin Assessment:      Cheek:  Intact  Neck:  Intact  Nose:  Intact    Device Skin Pressure Protection:  Positioning supports utilized    Nurse Notification:  Kaylin Vila, RRT

## 2024-05-01 NOTE — PROGRESS NOTES
Pulmonary / Critical Care Progress Note      Patient Name: Preston Wallis  : 1965  MRN: [id number]  Primary Care Physician:  Kimmy Riley MD  Date of admission: 2024    Subjective   Subjective   Follow-up for hypoxic and hypercapnic respiratory failure, severe COPD, obesity hypoventilation syndrome, noncompliance of NIV    Over past 24 hours: Remained on Eliquis.  Continued Brovana, Pulmicort, and MOIZ nebs.  Remains on IV Zosyn.    No acute events overnight.  Wore NIPPV    This morning,   On 2 L nasal cannula  Lying in bed  Denies any chest pain or chest tightness  Denies cough  No fever or chills  Diuresing well  -1.3 L urine output    Objective   Objective     Vitals:   Temp:  [97.3 °F (36.3 °C)-98.7 °F (37.1 °C)] 98.1 °F (36.7 °C)  Heart Rate:  [67-85] 67  Resp:  [12-20] 18  BP: (137-167)/(44-72) 153/72  Flow (L/min):  [1-2] 1  Physical Exam   Vital Signs Reviewed   General:  WDWN, Alert, NAD.  Chronically ill-appearing male, lying in bed  HEENT:  PERRL, EOMI.  OP, nares clear, no sinus tenderness  Neck:  Supple, no JVD, no thyromegaly  Chest: Improved aeration with bibasilar crackles and rhonchi, diminished right chest, equal rise and fall bilaterally, on 2 L  CV: RRR, no MGR, pulses 2+, equal.  Abd:  Soft, NT, ND, + BS, no HSM, obese  EXT:  no clubbing, no cyanosis, BLE edema  Neuro:  A&Ox3, CN grossly intact, no focal deficits.  Skin: No rashes or lesions noted      Result Review    Result Review:  I have personally reviewed the results from the time of this admission to 2024 07:22 EDT and agree with these findings:  [x]  Laboratory  [x]  Microbiology  [x]  Radiology  []  EKG/Telemetry   []  Cardiology/Vascular   []  Pathology  []  Old records  []  Other:  Most notable findings include:         Lab 24  0008 24  0743 24  0603 24  0140 24  0016   WBC 12.33* 9.41  --   --  15.37*   HEMOGLOBIN 7.9* 6.8*  --   --  7.8*   HEMATOCRIT 25.9* 22.6*  --   --  26.6*    PLATELETS 255 217  --   --  293   SODIUM 141 151* 138  --   --    SODIUM, ARTERIAL  --   --   --  133.8*  --    POTASSIUM 3.7 3.1* 4.7  --   --    CHLORIDE 96* 100 94*  --   --    CO2 37.4* 30.1* 37.1*  --   --    BUN 39* 38* 46*  --   --    CREATININE 2.07* 1.90* 2.45*  --   --    GLUCOSE 159* 97 157*  --   --    GLUCOSE, ARTERIAL  --   --   --  128*  --    CALCIUM 8.9 6.8* 8.6  --   --    PHOSPHORUS 3.6 2.5  --   --   --    ALBUMIN 2.8*  --   --   --   --        Assessment & Plan   Assessment / Plan     Active Hospital Problems:  Active Hospital Problems    Diagnosis     **Acute hypoxic on chronic hypercapnic respiratory failure      Impression:   Acute on chronic hypoxemic and hypercapnic respiratory failure requiring NIPPV  Severe COPD without exacerbation, FEV1 26%  Obesity hypoventilation syndrome  Acute on chronic decompensated congestive diastolic heart failure  Acute cardiogenic pulmonary edema  Recurrent Pseudomonas pneumonia, resistant to Levaquin  Altered mental status  CO2 narcosis  Anasarca  Anemia  CKD  Hypocalcemia  Medical noncompliance of NIV  History of MILADY  Tobacco abuse in remission    Plan:   -On 2 L nasal cannula.  Continue to maintain SpO2 greater than 90%.  Patient wears 2-3 at baseline   -Encourage NIPPV as needed with naps and at nighttime   -Patient s/p thoracentesis on 4/29 with 1 L removed.  Pleural fluid transudative per lights criteria likely consistent with CHF   -Continue with Bumex 2 mg orally twice daily.  Needs to be negative balance every day.Monitor renal function closely.  Serum creatinine trending up.  -Continue albuterol, Brovana, Pulmicort, and MOIZ nebs   -Continue Zosyn course for total of 7 days for pneumonia   -Continue Eliquis for A-fib   -Continue Jardiance 10 mg oral daily   -RT  on board.  Appreciate assistance     Last admission we arranged for NIPPV and chest vest.  Can use home NIPPV and can use chest vest here per protocol.  Per compliance reports  he has been noncompliant since he has been discharged          DVT prophylaxis:  Medical DVT prophylaxis orders are present.    CODE STATUS:   Code Status (Patient has no pulse and is not breathing): CPR (Attempt to Resuscitate)  Medical Interventions (Patient has pulse or is breathing): Full Support    I personally reviewed pertinent labs, imaging and provider notes. Discussed with bedside nurse and will discuss with primary service.     Electronically signed by Martín Rivera MD, 5/1/2024, 07:22 EDT.

## 2024-05-01 NOTE — PROGRESS NOTES
[facility]   Hospitalist Progress Note  Date: 2024  Patient Name: Preston Wallis  : 1965  MRN: [id number]  Date of admission: 2024      Subjective   Subjective     Chief Complaint: Lethargy, increased blood sugar and shortness of air    Summary:   Direct transfer from [facility]  ED.  Patient presented to outside ED due to lethargy and increased blood glucose of 515.  Although lethargic but  was responding appropriately.  Patient has past medical history of hypercapnic and hypoxemic respiratory failure, obstructive sleep apnea, A-fib, seizure disorder, CKD 3B, diabetes mellitus, anemia, recent CABG, BPH with self cath, and diabetic foot ulcers.  Patient was recently discharged on  from our hospital because of Pseudomonas pneumonia on Levaquin.  Pleural fluid  from thoracentesis was transudative.  Workup at the outside ED showed O2 sat to be 76% on 2 L.  Other vital signs stable patient is afebrile.  Initial ABG showed pH of 7.19 pCO2 of 103 and pO2 45.  With BiPAP 40% FiO2 his ABG showed pH of 7.25 pCO2 91 and pO2 of 62.  Patient is more alert, sitting and talking.  Chest x-ray showed worsening of right more than left effusion with possible right lower lobe pneumonia.  His kidney function has gotten worse with a creatinine of 2.4.  Recent creatinine was 1.7.  White cell count is 15,000.  Repeat blood sugar is better.  Patient given IV Vanco and cefepime at the outside facility and hospitalist has been called to admit him here.  Intensivist was also consulted by outside facility for ICU admission.  Patient had right thoracentesis about 1 L reddish tinged fluid on .  Patient Pseudomonas on  sputum culture was not sensitive to Levaquin.  Again growing Pseudomonas.    Interval Followup:   Patient on 1 L nasal cannula with 100% saturation.  Used BiPAP all night tolerating it well  Has not started his astral last night  Other vital signs stable  Awake alert oriented x 3.  Feeling  much better   hemoglobin up after blood transfusion.  Sodium normalized as IV Bumex was DC'd.  Creatinine and bicarb trending up  Does have cough productive of green mucus.  No chest pain.  Patient has not been compliant with his home astral as per RT case management evaluation.  Good urine output - 1.3L.  Regular BM   Review of Systems   All systems were reviewed and negative except for: Summary and interval follow-up    Objective   Objective     Vitals:   Temp:  [97.2 °F (36.2 °C)-98.7 °F (37.1 °C)] 97.2 °F (36.2 °C)  Heart Rate:  [67-77] 77  Resp:  [12-20] 16  BP: (122-167)/(40-72) 147/56  Flow (L/min):  [1] 1  Physical Exam    Constitutional: Awake, alert, no acute distress   Eyes: Pupils equal, sclerae anicteric, no conjunctival injection   HENT: NCAT, mucous membranes moist   Neck: Supple, no thyromegaly, no lymphadenopathy, trachea midline   Respiratory: Rhonchi and decreased to auscultation bilaterally, nonlabored respirations    Cardiovascular: RRR, no murmurs, rubs, or gallops, palpable pedal pulses bilaterally   Gastrointestinal: Positive bowel sounds, soft, nontender, nondistended   Musculoskeletal: +1 bilateral ankle edema, no clubbing or cyanosis to extremities   Psychiatric: Appropriate affect, cooperative   Neurologic: Oriented x 3, strength symmetric in all extremities, Cranial Nerves grossly intact to confrontation, speech clear   Skin: No rashes.  Left foot wound dressed    Result Review    Result Review:  I have personally reviewed the results for the past 24 hours and agree with these findings:  [x]  Laboratory  [x]  Microbiology  [x]  Radiology  []  EKG/Telemetry   []  Cardiology/Vascular   []  Pathology  [x]  Old records  [x]  Other: Medications    Assessment & Plan   Assessment / Plan     Assessment:  Healthcare associated pneumonia  Pseudomonas right lower lobe.  Right parapneumonic effusion.  S/p thoracentesis 1 L red tinged fluid drained April 29 by intensivist.  Acute on chronic hypoxemic  hypercapnic respiratory failure.  Resolved.  Obstructive sleep apnea on home astral.  Noncompliance with home NIPPV.  History of bronchiectasis.  Acute on chronic diastolic CHF.  Right more than left pleural effusion  CKD 3B baseline creatinine of 2.1.  Stable  S/p port placement.  Acute on chronic anemia stable.  No evidence of bleeding.  S/p 1 unit packed RBC  Diabetes mellitus.  Diabetic foot wound.  Paroxysmal A-fib on Eliquis.  Hyper natremia.  Free water deficit.  Resolved       Plan:  Continue supplemental oxygen keep sats more than 90%  NIPPV as needed and with naps.  Monitor ABG.  Patient to use his home astral.  Diamox x 1  IV Zosyn for 7 days.  Sputum culture growing Pseudomonas.  Sensitive pending  Resume home Bumex  Monitor sodium level  Monitor H&H.  Transfuse if hemoglobin less than 7  Nebulizer treatment.  MOIZ nebs as per pulmonary  Bronchodilator and bronchopulmonary team protocol.  Chest vest therapy, incentive spirometry, MetaNeb and flutter valve.  Appreciate pulmonary critical input.  S/p thoracentesis.  Await culture data.  Negative date  Appreciate RT case management.  Sliding-scale insulin.  Continue home Cymbalta, Farxiga Keppra, Lipitor, Coreg and aspirin.  Oral iron  Carb consistent heart healthy diet.  MRSA PCR negative.  Continue Doyle catheter.  Patient does straight catheter at home.  DC Odyle catheter at the time of discharge  Appreciate wound care nurse input.  PT OT.  Continue telemetry  Discussed plan with RN and .  Patient does not want to go to rehab.  Discharge home when IV Zosyn is set up.  Prescription given to  to arrange for home health and IV antibiotics.    DVT prophylaxis:  Medical DVT prophylaxis orders are present.        CODE STATUS:   Code Status (Patient has no pulse and is not breathing): CPR (Attempt to Resuscitate)  Medical Interventions (Patient has pulse or is breathing): Full Support      Part of this note may be an electronic  transcription/translation of spoken language to printed text using the Dragon Dictation System.     Electronically signed by Gatito Higginbotham MD, 05/01/24, 3:42 PM EDT.

## 2024-05-01 NOTE — PLAN OF CARE
Goal Outcome Evaluation:      Placed on bipap 18/8 rate 6 around 0020. He is tolerating it well. Spo2 95% on 30%.

## 2024-05-01 NOTE — THERAPY EVALUATION
Patient Name: Preston Wallis  : 1965    MRN: [id number]                              Today's Date: 2024       Admit Date: 2024    Visit Dx:     ICD-10-CM ICD-9-CM   1. Difficulty walking  R26.2 719.7   2. Decreased activities of daily living (ADL)  Z78.9 V49.89     Patient Active Problem List   Diagnosis    Pneumonia due to COVID-19 virus    Polyneuropathy    Paroxysmal atrial fibrillation    Obstructive sleep apnea    MRSA pneumonia    Low back pain    Chronic diastolic heart failure    Allergies    COPD exacerbation    Chronic anticoagulation    Benign prostatic hyperplasia    Impaired mobility and endurance    Stage 3a chronic kidney disease    Iron deficiency anemia secondary to inadequate dietary iron intake    Vitamin D deficiency    Class 3 severe obesity with serious comorbidity in adult    Lower extremity edema    Elevated alkaline phosphatase level    Venous insufficiency (chronic) (peripheral)    Tobacco abuse, in remission    History of Pseudomonas pneumonia    Chronic dyspnea    Gastroesophageal reflux disease    Bronchiectasis without complication    ERIC (acute kidney injury)    Altered mental status    Hyperlipidemia    Luetscher's syndrome    Neurogenic bladder    Class 1 obesity    Pneumonia of both lungs due to Pseudomonas species    Seizures    Primary osteoarthritis of left knee    Other constipation    Chronic obstructive pulmonary disease    Type 2 DM with CKD stage 3 and hypertension    Essential hypertension    Stage 3b chronic kidney disease    Annual physical exam    Long-term use of high-risk medication    Personal history of PE (pulmonary embolism)    Encounter for aftercare for healing closed traumatic fracture of left femur    Chronic pain of left knee    Primary osteoarthritis of right knee    Sepsis    Bronchiectasis with acute exacerbation    Bacterial pneumonia    Anemia    Closed fracture of left tibial plateau    Septic joint    Septic arthritis    Skin ulcer of  "left heel, limited to breakdown of skin    Ulcer of left foot, limited to breakdown of skin    Left foot pain    Wheezing    Acute on chronic respiratory failure with hypercapnia    Chronic respiratory failure with hypoxia and hypercapnia    Acute hypoxic on chronic hypercapnic respiratory failure     Past Medical History:   Diagnosis Date    Age-related cognitive decline     Allergic contact dermatitis     Allergies     Anemia     Bedbound     11/2023 \"MY LEG MUSCLES STOPPED WORKING\"    Bronchiectasis with acute lower respiratory infection     Charcot foot due to diabetes mellitus 09/10/2013    Chronic diastolic (congestive) heart failure     Chronic kidney disease     Chronic respiratory failure with hypoxia     Closed supracondylar fracture of femur 01/12/2022    COPD (chronic obstructive pulmonary disease)     Deep vein thrombosis (DVT) of lower extremity associated with air travel 01/13/2023    Dependence on supplemental oxygen     Eczema     Erectile dysfunction     due to organic reasons    Essential (primary) hypertension     Fracture     closed fracture of other tarsal and metatarsal bones    Fracture of proximal humerus 01/13/2023    GERD without esophagitis     High risk medication use     Hypercholesteremia     Hypomagnesemia     Infected stasis ulcer of left lower extremity 01/13/2023    Insomnia     Low back pain     Major depressive disorder     Morbid (severe) obesity due to excess calories     MRSA pneumonia     Muscle weakness     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 without evidence of necrosis     Obstructive sleep apnea (adult) (pediatric)     On home O2     REPORTS WEARING 2L/NC AAT    Other forms of dyspnea     Other long term (current) drug therapy     Other specified noninfective gastroenteritis and colitis     Other spondylosis, lumbar region     Pain in both knees     Paroxysmal atrial fibrillation     Peripheral neuropathy     attributed to " type 2 diabetes    Pneumonia, unspecified organism     Polyneuropathy     Rash and other nonspecific skin eruption     Syncope and collapse     Tachycardia     Tinnitus 01/13/2023    Type 1 diabetes mellitus with diabetic chronic kidney disease     Type 2 diabetes mellitus     Unspecified fall, initial encounter     Urinary retention      Past Surgical History:   Procedure Laterality Date    CHOLECYSTECTOMY      CYSTOSCOPY      FEMUR SURGERY Left     Shravan placed    KNEE SURGERY Left     OTHER SURGICAL HISTORY Left     venous port, REMOVED    PORTACATH PLACEMENT Right     TIBIAL PLATEAU OPEN REDUCTION INTERNAL FIXATION Left 12/22/2023    Procedure: TIBIAL PLATEAU OPEN REDUCTION INTERNAL FIXATION;  Surgeon: Hugo Kline MD;  Location: [facility] MAIN OR;  Service: Orthopedics;  Laterality: Left;    TONSILLECTOMY AND ADENOIDECTOMY        General Information       Row Name 05/01/24 1258          OT Time and Intention    Document Type evaluation  -AV     Mode of Treatment individual therapy;occupational therapy  -AV       Row Name 05/01/24 1258          General Information    Patient Profile Reviewed yes  -AV     Prior Level of Function --  Independent feeding and grooming with setup while seated. uses walk-in shower w grab bar. strait caths/ bedpan for further toilet hygiene. assist for sliding board transfers to wheelchair. non-ambulatory >1 yr.  2L continuous O2.  -AV     Existing Precautions/Restrictions fall;non-weight bearing;oxygen therapy device and L/min  reports still being NWB LLE (Left tibial plateau fracture/ORIF 12/22/23) upon questioning.  -AV     Barriers to Rehab none identified  -AV       Row Name 05/01/24 1258          Occupational Profile    Reason for Services/Referral (Occupational Profile) Patient is a 58 year old male admitted to [facility] on 4/28/24 with altered mental status and hyperglycemia. He is currently on 3W/ bipap with sats 97%. OT consulted due to recent decline in  ADL/ transfer independence. No previous OT services for current condition.  -AV       Row Name 05/01/24 1258          Living Environment    People in Home child(chon), adult  -AV       Row Name 05/01/24 1258          Home Main Entrance    Number of Stairs, Main Entrance none  ramped entry  -AV       Row Name 05/01/24 1258          Cognition    Orientation Status (Cognition) --  alert, pleasant and cooperative. able to retain information and follow commands.  -AV       Row Name 05/01/24 1258          Safety Issues, Functional Mobility    Impairments Affecting Function (Mobility) balance;endurance/activity tolerance;strength  -AV               User Key  (r) = Recorded By, (t) = Taken By, (c) = Cosigned By      Initials Name Provider Type    Matthew Schaefer OT Occupational Therapist                     Mobility/ADL's       Row Name 05/01/24 1306          Transfers    Comment, (Transfers) mod assist bed mobility per PT  -AV       Row Name 05/01/24 1306          Activities of Daily Living    BADL Assessment/Intervention --  Reports requiring some assist to feed self currently. Min assist grooming with setup in bed. max assist bathing/ dressing.  -AV               User Key  (r) = Recorded By, (t) = Taken By, (c) = Cosigned By      Initials Name Provider Type    Matthew Schaefer OT Occupational Therapist                   Obj/Interventions       Row Name 05/01/24 1309          Sensory Assessment (Somatosensory)    Sensory Assessment reports history of hands being numb. hot/cold discrimination and sensory awareness to light touch intact this session.  -AV       Row Name 05/01/24 1309          Vision Assessment/Intervention    Visual Impairment/Limitations WFL;corrective lenses for reading  -AV       Row Name 05/01/24 1309          Range of Motion Comprehensive    General Range of Motion upper extremity range of motion deficits identified  -AV     Comment, General Range of Motion history of right upper extremity fracture  w residual limitation <45 AROM at shoulder  -AV       Row Name 05/01/24 1309          Strength Comprehensive (MMT)    Comment, General Manual Muscle Testing (MMT) Assessment 4(-)/5 bilateral biceps, triceps and   -AV       Row Name 05/01/24 1309          Motor Skills    Motor Skills functional endurance;coordination  -AV     Coordination --  right dominant- diminished prior to onset  -AV     Functional Endurance poor plus  -AV       Row Name 05/01/24 1309          Balance    Comment, Balance impaired  -AV               User Key  (r) = Recorded By, (t) = Taken By, (c) = Cosigned By      Initials Name Provider Type    AV Matthew Lin OT Occupational Therapist                   Goals/Plan       [facility] Name 05/01/24 1314          Transfer Goal 1 (OT)    Activity/Assistive Device (Transfer Goal 1, OT) transfers, all  sliding board  -AV     [place] Level/Cues Needed (Transfer Goal 1, OT) minimum assist (75% or more patient effort)  -AV     Time Frame (Transfer Goal 1, OT) long term goal (LTG);10 days  -AV       [facility] Name 05/01/24 1314          Bathing Goal 1 (OT)    Activity/Device (Bathing Goal 1, OT) bathing skills, all  -AV     [place] Level/Cues Needed (Bathing Goal 1, OT) modified independence  -AV     Time Frame (Bathing Goal 1, OT) long term goal (LTG);10 days  -AV       [facility] Name 05/01/24 1314          Dressing Goal 1 (OT)    Activity/Device (Dressing Goal 1, OT) dressing skills, all  -AV     [place]/Cues Needed (Dressing Goal 1, OT) modified independence  -AV     Time Frame (Dressing Goal 1, OT) long term goal (LTG);10 days  -AV       [facility] Name 05/01/24 1314          Toileting Goal 1 (OT)    Activity/Device (Toileting Goal 1, OT) toileting skills, all;raised toilet seat  -AV     [place] Level/Cues Needed (Toileting Goal 1, OT) modified independence  -AV     Time Frame (Toileting Goal 1, OT) long term goal (LTG);10 days  -AV       [facility] Name 05/01/24 1314          Grooming Goal 1 (OT)     Activity/Device (Grooming Goal 1, OT) grooming skills, all  -AV     [place] (Grooming Goal 1, OT) modified independence  seated as prior  -AV     Time Frame (Grooming Goal 1, OT) long term goal (LTG);10 days  -AV       Row Name 05/01/24 1314          Strength Goal 1 (OT)    Strength Goal 1 (OT) Patient will demonstrate 4/5 bilateral biceps, triceps and  to increase ADL/transfer independence.  -AV     Time Frame (Strength Goal 1, OT) long term goal (LTG);10 days  -AV       Row Name 05/01/24 1314          Problem Specific Goal 1 (OT)    Problem Specific Goal 1 (OT) Patient will demonstrate fair endurance/ activity tolerance needed to support ADLs.  -AV     Time Frame (Problem Specific Goal 1, OT) long term goal (LTG);10 days  -AV       Row Name 05/01/24 1314          Therapy Assessment/Plan (OT)    Planned Therapy Interventions (OT) activity tolerance training;BADL retraining;functional balance retraining;occupation/activity based interventions;patient/caregiver education/training;strengthening exercise;transfer/mobility retraining  -AV               User Key  (r) = Recorded By, (t) = Taken By, (c) = Cosigned By      Initials Name Provider Type    AV Matthew Lin OT Occupational Therapist                   Clinical Impression       Row Name 05/01/24 1312          Pain Assessment    Additional Documentation Pain Scale: FACES Pre/Post-Treatment (Group)  -AV       Row Name 05/01/24 1312          Pain Scale: FACES Pre/Post-Treatment    Pain: FACES Scale, Pretreatment 0-->no hurt  -AV     Posttreatment Pain Rating 0-->no hurt  -AV       Row Name 05/01/24 1312          Plan of Care Review    Plan of Care Reviewed With patient  -AV     Progress no change  first session: evaluation  -AV     Outcome Evaluation Patient presents with limitations of balance, strength and endurance/ activity tolerance which are impacting ADL/ transfer independence. Skilled OT is indicated to remediate/ compensate for deficits to  maximize independence and safety with functional tasks.  -AV       Row Name 05/01/24 1312          Therapy Assessment/Plan (OT)    Patient/Family Therapy Goal Statement (OT) regain independence  -AV     Rehab Potential (OT) good, to achieve stated therapy goals  -AV     Criteria for Skilled Therapeutic Interventions Met (OT) yes;meets criteria;skilled treatment is necessary  -AV     Therapy Frequency (OT) 5 times/wk  -AV       Row Name 05/01/24 1312          Therapy Plan Review/Discharge Plan (OT)    Anticipated Discharge Disposition (OT) sub acute care setting  -AV       Row Name 05/01/24 1312          Positioning and Restraints    Pre-Treatment Position in bed  -AV     Post Treatment Position bed  -AV     In Bed call light within reach;encouraged to call for assist  -AV               User Key  (r) = Recorded By, (t) = Taken By, (c) = Cosigned By      Initials Name Provider Type    Matthew Schaefer, OT Occupational Therapist                   Outcome Measures       Row Name 05/01/24 1316          How much help from another is currently needed...    Putting on and taking off regular lower body clothing? 2  -AV     Bathing (including washing, rinsing, and drying) 2  -AV     Toileting (which includes using toilet bed pan or urinal) 1  -AV     Putting on and taking off regular upper body clothing 3  -AV     Taking care of personal grooming (such as brushing teeth) 3  -AV     Eating meals 3  -AV     AM-PAC 6 Clicks Score (OT) 14  -AV       Row Name 05/01/24 0800          How much help from another person do you currently need...    Turning from your back to your side while in flat bed without using bedrails? 2  -TM     Moving from lying on back to sitting on the side of a flat bed without bedrails? 2  -TM     Moving to and from a bed to a chair (including a wheelchair)? 1  -TM     Standing up from a chair using your arms (e.g., wheelchair, bedside chair)? 1  -TM     Climbing 3-5 steps with a railing? 1  -TM     To walk  in hospital room? 1  -TM     AM-PAC 6 Clicks Score (PT) 8  -TM     Highest Level of Mobility Goal 3 --> Sit at edge of bed  -TM       Row Name 05/01/24 1316          Functional Assessment    Outcome Measure Options AM-PAC 6 Clicks Daily Activity (OT);Optimal Instrument  -AV       Row Name 05/01/24 1316          Optimal Instrument    Optimal Instrument Optimal - 3  -AV     Bending/Stooping 5  -AV     Standing 5  -AV     Reaching 3  -AV     From the list, choose the 3 activities you would most like to be able to do without any difficulty Bending/stooping;Standing;Reaching  -AV     Total Score Optimal - 3 13  -AV               User Key  (r) = Recorded By, (t) = Taken By, (c) = Cosigned By      Initials Name Provider Type    TM Manda Peres, RN Registered Nurse    Matthew Schaefer OT Occupational Therapist                    Occupational Therapy Education       Title: PT OT SLP Therapies (Done)       Topic: Occupational Therapy (Done)       Point: ADL training (Done)       Description:   Instruct learner(s) on proper safety adaptation and remediation techniques during self care or transfers.   Instruct in proper use of assistive devices.                  Learning Progress Summary             Patient Acceptance, E, VU by PANCHITO at 5/1/2024 1316                         Point: Home exercise program (Done)       Description:   Instruct learner(s) on appropriate technique for monitoring, assisting and/or progressing therapeutic exercises/activities.                  Learning Progress Summary             Patient Acceptance, E, VU by PANCHITO at 5/1/2024 1316                         Point: Precautions (Done)       Description:   Instruct learner(s) on prescribed precautions during self-care and functional transfers.                  Learning Progress Summary             Patient Acceptance, E, VU by PANCHITO at 5/1/2024 1316                         Point: Body mechanics (Done)       Description:   Instruct learner(s) on proper positioning  and spine alignment during self-care, functional mobility activities and/or exercises.                  Learning Progress Summary             Patient Acceptance, E, VU by AV at 5/1/2024 1316                                         User Key       Initials Effective Dates Name Provider Type [facility] 06/16/21 -  Matthew Lin OT Occupational Therapist OT                  OT Recommendation and Plan  Planned Therapy Interventions (OT): activity tolerance training, BADL retraining, functional balance retraining, occupation/activity based interventions, patient/caregiver education/training, strengthening exercise, transfer/mobility retraining  Therapy Frequency (OT): 5 times/wk  Plan of Care Review  Plan of Care Reviewed With: patient  Progress: no change (first session: evaluation)  Outcome Evaluation: Patient presents with limitations of balance, strength and endurance/ activity tolerance which are impacting ADL/ transfer independence. Skilled OT is indicated to remediate/ compensate for deficits to maximize independence and safety with functional tasks.     Time Calculation:   Evaluation Complexity (OT)  Review Occupational Profile/Medical/Therapy History Complexity: expanded/moderate complexity  Assessment, Occupational Performance/Identification of Deficit Complexity: 1-3 performance deficits  Clinical Decision Making Complexity (OT): problem focused assessment/low complexity  Overall Complexity of Evaluation (OT): low complexity     Time Calculation- OT       Row Name 05/01/24 1317             Time Calculation- OT    OT Received On 05/01/24  -AV      OT Goal Re-Cert Due Date 05/10/24  -AV         Untimed Charges    OT Eval/Re-eval Minutes 35  -AV         Total Minutes    Untimed Charges Total Minutes 35  -AV       Total Minutes 35  -AV                User Key  (r) = Recorded By, (t) = Taken By, (c) = Cosigned By      Initials Name Provider Type    Matthew Schaefer OT Occupational Therapist                   Therapy Charges for Today       Code Description Service Date Service Provider Modifiers Qty    [id number] HC OT EVAL LOW COMPLEXITY 3 5/1/2024 Matthew Lin, OT GO 1                 Matthew Lin OT  5/1/2024

## 2024-05-02 LAB
ALBUMIN SERPL-MCNC: 2.8 G/DL (ref 3.5–5.2)
ANION GAP SERPL CALCULATED.3IONS-SCNC: 7.1 MMOL/L (ref 5–15)
BACTERIA FLD CULT: NORMAL
BUN SERPL-MCNC: 33 MG/DL (ref 6–20)
BUN/CREAT SERPL: 17.6 (ref 7–25)
CALCIUM SPEC-SCNC: 8.9 MG/DL (ref 8.6–10.5)
CHLORIDE SERPL-SCNC: 98 MMOL/L (ref 98–107)
CO2 SERPL-SCNC: 37.9 MMOL/L (ref 22–29)
CREAT SERPL-MCNC: 1.87 MG/DL (ref 0.76–1.27)
CYTO UR: NORMAL
EGFRCR SERPLBLD CKD-EPI 2021: 41.2 ML/MIN/1.73
GLUCOSE BLDC GLUCOMTR-MCNC: 102 MG/DL (ref 70–99)
GLUCOSE BLDC GLUCOMTR-MCNC: 145 MG/DL (ref 70–99)
GLUCOSE BLDC GLUCOMTR-MCNC: 158 MG/DL (ref 70–99)
GLUCOSE BLDC GLUCOMTR-MCNC: 173 MG/DL (ref 70–99)
GLUCOSE SERPL-MCNC: 99 MG/DL (ref 65–99)
GRAM STN SPEC: NORMAL
GRAM STN SPEC: NORMAL
LAB AP CASE REPORT: NORMAL
LAB AP CLINICAL INFORMATION: NORMAL
PATH REPORT.FINAL DX SPEC: NORMAL
PATH REPORT.GROSS SPEC: NORMAL
PHOSPHATE SERPL-MCNC: 3.6 MG/DL (ref 2.5–4.5)
POTASSIUM SERPL-SCNC: 3.8 MMOL/L (ref 3.5–5.2)
SODIUM SERPL-SCNC: 143 MMOL/L (ref 136–145)
WHOLE BLOOD HOLD COAG: NORMAL
WHOLE BLOOD HOLD SPECIMEN: NORMAL

## 2024-05-02 PROCEDURE — 63710000001 INSULIN DETEMIR PER 5 UNITS: Performed by: INTERNAL MEDICINE

## 2024-05-02 PROCEDURE — 82948 REAGENT STRIP/BLOOD GLUCOSE: CPT

## 2024-05-02 PROCEDURE — 99232 SBSQ HOSP IP/OBS MODERATE 35: CPT | Performed by: FAMILY MEDICINE

## 2024-05-02 PROCEDURE — 94799 UNLISTED PULMONARY SVC/PX: CPT

## 2024-05-02 PROCEDURE — 94761 N-INVAS EAR/PLS OXIMETRY MLT: CPT

## 2024-05-02 PROCEDURE — 99233 SBSQ HOSP IP/OBS HIGH 50: CPT | Performed by: INTERNAL MEDICINE

## 2024-05-02 PROCEDURE — 94664 DEMO&/EVAL PT USE INHALER: CPT

## 2024-05-02 PROCEDURE — 82948 REAGENT STRIP/BLOOD GLUCOSE: CPT | Performed by: INTERNAL MEDICINE

## 2024-05-02 PROCEDURE — 97110 THERAPEUTIC EXERCISES: CPT

## 2024-05-02 PROCEDURE — 63710000001 INSULIN LISPRO (HUMAN) PER 5 UNITS: Performed by: INTERNAL MEDICINE

## 2024-05-02 PROCEDURE — 25010000002 PIPERACILLIN SOD-TAZOBACTAM PER 1 G: Performed by: INTERNAL MEDICINE

## 2024-05-02 PROCEDURE — 80069 RENAL FUNCTION PANEL: CPT | Performed by: INTERNAL MEDICINE

## 2024-05-02 RX ORDER — TAMSULOSIN HYDROCHLORIDE 0.4 MG/1
0.4 CAPSULE ORAL DAILY
Status: DISCONTINUED | OUTPATIENT
Start: 2024-05-02 | End: 2024-05-03 | Stop reason: HOSPADM

## 2024-05-02 RX ADMIN — INSULIN DETEMIR 10 UNITS: 100 INJECTION, SOLUTION SUBCUTANEOUS at 21:12

## 2024-05-02 RX ADMIN — FINASTERIDE 5 MG: 5 TABLET, FILM COATED ORAL at 21:24

## 2024-05-02 RX ADMIN — CARVEDILOL 25 MG: 25 TABLET, FILM COATED ORAL at 21:12

## 2024-05-02 RX ADMIN — PIPERACILLIN SODIUM AND TAZOBACTAM SODIUM 3.38 G: 3; .375 INJECTION, POWDER, LYOPHILIZED, FOR SOLUTION INTRAVENOUS at 22:23

## 2024-05-02 RX ADMIN — EMPAGLIFLOZIN 10 MG: 10 TABLET, FILM COATED ORAL at 09:27

## 2024-05-02 RX ADMIN — CARVEDILOL 25 MG: 25 TABLET, FILM COATED ORAL at 09:27

## 2024-05-02 RX ADMIN — INSULIN LISPRO 3 UNITS: 100 INJECTION, SOLUTION INTRAVENOUS; SUBCUTANEOUS at 17:31

## 2024-05-02 RX ADMIN — Medication 1 MG: at 21:11

## 2024-05-02 RX ADMIN — BUDESONIDE 0.5 MG: 0.5 INHALANT RESPIRATORY (INHALATION) at 19:40

## 2024-05-02 RX ADMIN — ARFORMOTEROL TARTRATE 15 MCG: 15 SOLUTION RESPIRATORY (INHALATION) at 19:40

## 2024-05-02 RX ADMIN — Medication 10 ML: at 09:28

## 2024-05-02 RX ADMIN — INSULIN LISPRO 3 UNITS: 100 INJECTION, SOLUTION INTRAVENOUS; SUBCUTANEOUS at 21:23

## 2024-05-02 RX ADMIN — MONTELUKAST 10 MG: 10 TABLET, FILM COATED ORAL at 21:11

## 2024-05-02 RX ADMIN — TAMSULOSIN HYDROCHLORIDE 0.4 MG: 0.4 CAPSULE ORAL at 10:40

## 2024-05-02 RX ADMIN — PIPERACILLIN SODIUM AND TAZOBACTAM SODIUM 3.38 G: 3; .375 INJECTION, POWDER, LYOPHILIZED, FOR SOLUTION INTRAVENOUS at 07:51

## 2024-05-02 RX ADMIN — ATORVASTATIN CALCIUM 20 MG: 20 TABLET, FILM COATED ORAL at 21:11

## 2024-05-02 RX ADMIN — TOBRAMYCIN 300 MG: 300 SOLUTION RESPIRATORY (INHALATION) at 06:40

## 2024-05-02 RX ADMIN — FERROUS SULFATE TAB 325 MG (65 MG ELEMENTAL FE) 325 MG: 325 (65 FE) TAB at 09:27

## 2024-05-02 RX ADMIN — BUMETANIDE 2 MG: 1 TABLET ORAL at 21:11

## 2024-05-02 RX ADMIN — BUDESONIDE 0.5 MG: 0.5 INHALANT RESPIRATORY (INHALATION) at 06:40

## 2024-05-02 RX ADMIN — BUMETANIDE 2 MG: 1 TABLET ORAL at 09:27

## 2024-05-02 RX ADMIN — LEVETIRACETAM 500 MG: 500 TABLET, FILM COATED ORAL at 09:27

## 2024-05-02 RX ADMIN — Medication 10 ML: at 21:12

## 2024-05-02 RX ADMIN — ARFORMOTEROL TARTRATE 15 MCG: 15 SOLUTION RESPIRATORY (INHALATION) at 06:40

## 2024-05-02 RX ADMIN — FAMOTIDINE 40 MG: 20 TABLET ORAL at 09:27

## 2024-05-02 RX ADMIN — ASPIRIN 81 MG: 81 TABLET, COATED ORAL at 09:27

## 2024-05-02 RX ADMIN — DULOXETINE HYDROCHLORIDE 30 MG: 30 CAPSULE, DELAYED RELEASE ORAL at 09:27

## 2024-05-02 RX ADMIN — APIXABAN 5 MG: 5 TABLET, FILM COATED ORAL at 10:40

## 2024-05-02 RX ADMIN — LEVETIRACETAM 500 MG: 500 TABLET, FILM COATED ORAL at 21:11

## 2024-05-02 RX ADMIN — Medication 2000 UNITS: at 09:27

## 2024-05-02 RX ADMIN — PIPERACILLIN SODIUM AND TAZOBACTAM SODIUM 3.38 G: 3; .375 INJECTION, POWDER, LYOPHILIZED, FOR SOLUTION INTRAVENOUS at 14:35

## 2024-05-02 NOTE — PROGRESS NOTES
[facility]   Hospitalist Progress Note  Date: 2024  Patient Name: Preston Wallis  : 1965  MRN: [id number]  Date of admission: 2024      Subjective   Subjective     Chief complaint: Lethargy, shortness of breath    Summary:  Direct transfer from an outside facility.  58-year-old male hospitalized on 2024 with increased lethargy, elevated blood sugars, shortness of breath with underlying history of chronic hypoxemic and hypercapnic respiratory failure with NIPPV device, MILADY, seizure disorder, CKD stage IIIb, recent CABG, BPH with history of self catheterizations, diabetic foot ulcers, recent hospitalization for Pseudomonas pneumonia, placed in the critical care unit here, status post thoracentesis, mentation improved, Pseudomonas on sputum culture, not sensitive to Levaquin, needing to be treated with cefepime.  Did require blood transfusions for hemoglobin stabilization.  IV antibiotics arranged for discharge planning for home.  Tolerating NIPPV.  Continued on diuretics.  Pulmonary consulted.  Issues with Port-A-Cath, excessive bleeding.  Awaiting Port-A-Cath reaccess prior to disposition.    Interval follow-up: Seen and examined this morning, no acute distress, no acute major night events, continue of cough and shortness of symptoms but doing well on 2 L nasal cannula oxygen.  Eager to go home but has significant bleeding from Port-A-Cath site.  Pressure dressing applied.  Await hemostasis prior to disposition planning, IV antibiotics being arranged in the outpatient setting.  No chest pain or palpitations.  No nausea or vomiting.  Creatinine 1.87, BUN 33, potassium 3.8, respiratory culture shows Pseudomonas and strep pyogenes, sensitivities pending.    Review of systems:  All systems reviewed and negative except for weakness, fatigue, cough, shortness of breath, bleeding from port    Objective   Objective     Vitals:   Temp:  [97.5 °F (36.4 °C)-99 °F (37.2 °C)] 99 °F (37.2 °C)  Heart Rate:   [74-77] 76  Resp:  [16-20] 18  BP: (124-156)/(46-70) 156/67  Flow (L/min):  [1-1.5] 1  Physical Exam     Physical Exam                         Constitutional: Awake, alert, no acute distress, Port-A-Cath accessed with bleeding              Eyes: Pupils equal, sclerae anicteric, no conjunctival injection              HENT: NCAT, mucous membranes moist              Neck: Supple, full range of motion              Respiratory: Rhonchi and decreased to auscultation bilaterally, nonlabored respirations               Cardiovascular: RRR, no murmurs, rubs, or gallops, palpable pedal pulses bilaterally              Gastrointestinal: Positive bowel sounds, soft, nontender, nondistended              Musculoskeletal: +1 bilateral ankle edema, no clubbing or cyanosis to extremities              Psychiatric: Appropriate affect, cooperative              Neurologic: Oriented x 3, strength symmetric in all extremities, Cranial Nerves grossly intact to confrontation, speech clear              Skin: No rashes.  Left foot wound dressed        Result Review    Result Review:  I have personally reviewed the pertinent results from the past 24 hours to 5/2/2024 15:31 EDT and agree with these findings:  [x]  Laboratory   CBC          4/29/2024    00:16 4/30/2024    07:43 5/1/2024    00:08   CBC   WBC 15.37  9.41  12.33    RBC 2.86  2.48  2.86    Hemoglobin 7.8  6.8  7.9    Hematocrit 26.6  22.6  25.9    MCV 93.0  91.1  90.6    MCH 27.3  27.4  27.6    MCHC 29.3  30.1  30.5    RDW 14.7  14.6  14.8    Platelets 293  217  255      BMP          4/30/2024    07:43 5/1/2024    00:08 5/2/2024    07:51   BMP   BUN 38  39  33    Creatinine 1.90  2.07  1.87    Sodium 151  141  143    Potassium 3.1  3.7  3.8    Chloride 100  96  98    CO2 30.1  37.4  37.9    Calcium 6.8  8.9  8.9      LIVER FUNCTION TESTS:      Lab 05/02/24  0751 05/01/24  0008   ALBUMIN 2.8* 2.8*       [x]  Microbiology   Microbiology Results (last 10 days)       Procedure Component  Value - Date/Time    Anaerobic Culture - Pleural Fluid, Pleural Cavity [[id number]]  (Normal) Collected: 04/29/24 1003    Lab Status: Preliminary result Specimen: Pleural Fluid from Pleural Cavity Updated: 05/02/24 0941     Anaerobic Culture No anaerobes isolated at 3 days    AFB Culture - Body Fluid, Pleural Cavity [[id number]] Collected: 04/29/24 1000    Lab Status: Preliminary result Specimen: Body Fluid from Pleural Cavity Updated: 04/29/24 1353     AFB Stain No acid fast bacilli seen    Body Fluid Culture - Body Fluid, Pleural Cavity [[id number]] Collected: 04/29/24 1000    Lab Status: Final result Specimen: Body Fluid from Pleural Cavity Updated: 05/02/24 0840     Body Fluid Culture No growth at 3 days     Gram Stain Few (2+) WBCs seen      No organisms seen    MRSA Screen, PCR (Inpatient) - Swab, Nares [[id number]]  (Normal) Collected: 04/29/24 0337    Lab Status: Final result Specimen: Swab from Nares Updated: 04/29/24 0459     MRSA PCR No MRSA Detected    Narrative:      The negative predictive value of this diagnostic test is high and should only be used to consider de-escalating anti-MRSA therapy. A positive result may indicate colonization with MRSA and must be correlated clinically.    Respiratory Culture - Sputum, Throat [[id number]]  (Abnormal) Collected: 04/29/24 0052    Lab Status: Preliminary result Specimen: Sputum from Throat Updated: 05/02/24 1027     Respiratory Culture Light growth (2+) Pseudomonas aeruginosa     Comment: Sending to [facility] for MICs             Moderate growth (3+) Streptococcus pyogenes (Group A)     Comment:   This organism is considered to be universally susceptible to penicillin.  No further antibiotic testing will be performed. If Clindamycin or Erythromycin is the drug of choice, notify the laboratory within 7 days to request susceptibility testing.         Moderate growth (3+) Normal Respiratory Nola     Gram Stain Moderate (3+) WBCs seen      Occasional Epithelial cells seen       Few (2+) Gram negative bacilli      Occasional Gram positive cocci    Blood Culture - Blood, Arm, Left [[id number]]  (Normal) Collected: 04/29/24 0041    Lab Status: Preliminary result Specimen: Blood from Arm, Left Updated: 05/02/24 0045     Blood Culture No growth at 3 days    Blood Culture - Blood, Blood, Port [[id number]]  (Normal) Collected: 04/29/24 0034    Lab Status: Preliminary result Specimen: Blood, Port Updated: 05/02/24 0045     Blood Culture No growth at 3 days              [x]  Radiology XR Chest 1 View    Result Date: 4/29/2024  Impression:  1. No pneumothorax status post right thoracentesis 2. Grossly stable multifocal bilateral airspace disease   Electronically Signed By-Derrick Cortez On:4/29/2024 11:17 AM      XR Chest 1 View    Result Date: 4/29/2024   1. Worsening right pleural effusion and right lung consolidation. 2. Stable left upper lobe infiltrates with improved left basilar infiltrates  Electronically Signed By-Dr. Edison Guthrie MD On:4/29/2024 12:13 AM         []  EKG/Telemetry   No orders to display       []  Cardiology/Vascular   []  Pathology  [x]  Old records  []  Other:    Assessment & Plan   Assessment / Plan     Assessment/Plan:    Assessment:  Healthcare associated pneumonia  Pseudomonas and strep pyogenes involving the right lower lobe.  Right parapneumonic effusion.  S/p thoracentesis 1 L red tinged fluid   Acute on chronic hypoxemic hypercapnic respiratory failure  Obstructive sleep apnea on home astral.  Noncompliance with home NIPPV.  History of bronchiectasis.  Acute on chronic diastolic CHF.  Right more than left pleural effusion  CKD 3B baseline creatinine of 2.1.  Stable  S/p port placement.  Acute on chronic anemia stable.  No evidence of bleeding.  S/p 1 unit packed RBC  Diabetes mellitus.  Diabetic foot wound.  Paroxysmal A-fib on Eliquis.  Hypernatremia.  Free water deficit.  Resolved    Plan:  Labs and imaging reviewed  Continue [facility]  Follow-up  sensitivities  Holding off Diamox  Continue on Brovana pulm nebs twice daily  Continue Bumex 2 mg p.o. twice daily  Strict I's and O's  Daily weights  Continue aspirin, Eliquis, Lipitor  Continue Cymbalta  Continue Jardiance  Continue Proscar  Continue MOIZ nebs  Continue tamsulosin  Continue Keppra  Continue insulin sign scale coverage with additional Levemir 10 units nightly  Depending on how he does over the next 24 hours significant achieve hemostasis around his port site to have it reaccessed, will plan disposition  A.m. labs  Full code  DVT prophylaxis with Eliquis  Clinical to dictate further management  Discussed with nurse at bedside  Discussed with Dr. Rivera, pulmonary; wants to ensure sensitivities are available prior to disposition    DVT prophylaxis:  Medical DVT prophylaxis orders are present.        CODE STATUS:   Code Status (Patient has no pulse and is not breathing): CPR (Attempt to Resuscitate)  Medical Interventions (Patient has pulse or is breathing): Full Support        Electronically signed by Rebel Gonzalez MD, 5/2/2024, 15:31 EDT.    Portions of this documentation were transcribed electronically from a voice recognition software.  I confirm all data accurately represents the service(s) I performed at today's visit.

## 2024-05-02 NOTE — THERAPY TREATMENT NOTE
Patient Name: Preston Wallis  : 1965    MRN: [id number]                              Today's Date: 2024       Admit Date: 2024    Visit Dx:     ICD-10-CM ICD-9-CM   1. Difficulty walking  R26.2 719.7   2. Decreased activities of daily living (ADL)  Z78.9 V49.89     Patient Active Problem List   Diagnosis    Pneumonia due to COVID-19 virus    Polyneuropathy    Paroxysmal atrial fibrillation    Obstructive sleep apnea    MRSA pneumonia    Low back pain    Chronic diastolic heart failure    Allergies    COPD exacerbation    Chronic anticoagulation    Benign prostatic hyperplasia    Impaired mobility and endurance    Stage 3a chronic kidney disease    Iron deficiency anemia secondary to inadequate dietary iron intake    Vitamin D deficiency    Class 3 severe obesity with serious comorbidity in adult    Lower extremity edema    Elevated alkaline phosphatase level    Venous insufficiency (chronic) (peripheral)    Tobacco abuse, in remission    History of Pseudomonas pneumonia    Chronic dyspnea    Gastroesophageal reflux disease    Bronchiectasis without complication    ERIC (acute kidney injury)    Altered mental status    Hyperlipidemia    Luetscher's syndrome    Neurogenic bladder    Class 1 obesity    Pneumonia of both lungs due to Pseudomonas species    Seizures    Primary osteoarthritis of left knee    Other constipation    Chronic obstructive pulmonary disease    Type 2 DM with CKD stage 3 and hypertension    Essential hypertension    Stage 3b chronic kidney disease    Annual physical exam    Long-term use of high-risk medication    Personal history of PE (pulmonary embolism)    Encounter for aftercare for healing closed traumatic fracture of left femur    Chronic pain of left knee    Primary osteoarthritis of right knee    Sepsis    Bronchiectasis with acute exacerbation    Bacterial pneumonia    Anemia    Closed fracture of left tibial plateau    Septic joint    Septic arthritis    Skin ulcer of  "left heel, limited to breakdown of skin    Ulcer of left foot, limited to breakdown of skin    Left foot pain    Wheezing    Acute on chronic respiratory failure with hypercapnia    Chronic respiratory failure with hypoxia and hypercapnia    Acute hypoxic on chronic hypercapnic respiratory failure     Past Medical History:   Diagnosis Date    Age-related cognitive decline     Allergic contact dermatitis     Allergies     Anemia     Bedbound     11/2023 \"MY LEG MUSCLES STOPPED WORKING\"    Bronchiectasis with acute lower respiratory infection     Charcot foot due to diabetes mellitus 09/10/2013    Chronic diastolic (congestive) heart failure     Chronic kidney disease     Chronic respiratory failure with hypoxia     Closed supracondylar fracture of femur 01/12/2022    COPD (chronic obstructive pulmonary disease)     Deep vein thrombosis (DVT) of lower extremity associated with air travel 01/13/2023    Dependence on supplemental oxygen     Eczema     Erectile dysfunction     due to organic reasons    Essential (primary) hypertension     Fracture     closed fracture of other tarsal and metatarsal bones    Fracture of proximal humerus 01/13/2023    GERD without esophagitis     High risk medication use     Hypercholesteremia     Hypomagnesemia     Infected stasis ulcer of left lower extremity 01/13/2023    Insomnia     Low back pain     Major depressive disorder     Morbid (severe) obesity due to excess calories     MRSA pneumonia     Muscle weakness     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 without evidence of necrosis     Obstructive sleep apnea (adult) (pediatric)     On home O2     REPORTS WEARING 2L/NC AAT    Other forms of dyspnea     Other long term (current) drug therapy     Other specified noninfective gastroenteritis and colitis     Other spondylosis, lumbar region     Pain in both knees     Paroxysmal atrial fibrillation     Peripheral neuropathy     attributed to " type 2 diabetes    Pneumonia, unspecified organism     Polyneuropathy     Rash and other nonspecific skin eruption     Syncope and collapse     Tachycardia     Tinnitus 01/13/2023    Type 1 diabetes mellitus with diabetic chronic kidney disease     Type 2 diabetes mellitus     Unspecified fall, initial encounter     Urinary retention      Past Surgical History:   Procedure Laterality Date    CHOLECYSTECTOMY      CYSTOSCOPY      FEMUR SURGERY Left     Shravan placed    KNEE SURGERY Left     OTHER SURGICAL HISTORY Left     venous port, REMOVED    PORTACATH PLACEMENT Right     TIBIAL PLATEAU OPEN REDUCTION INTERNAL FIXATION Left 12/22/2023    Procedure: TIBIAL PLATEAU OPEN REDUCTION INTERNAL FIXATION;  Surgeon: Hugo Kline MD;  Location: [facility] OR;  Service: Orthopedics;  Laterality: Left;    TONSILLECTOMY AND ADENOIDECTOMY        General Information       Row Name 05/02/24 1108          OT Time and Intention    Document Type therapy note (daily note)  -AV     Mode of Treatment individual therapy;occupational therapy  -AV       Row Name 05/02/24 1108          General Information    Existing Precautions/Restrictions fall;non-weight bearing;oxygen therapy device and L/min  NWB LLE  -AV       Row Name 05/02/24 1108          Cognition    Orientation Status (Cognition) --  Alert and cooperative.  Able to perform upper extremity exercises with moderate cues and demonstration.  -AV       Row Name 05/02/24 1108          Safety Issues, Functional Mobility    Impairments Affecting Function (Mobility) balance;endurance/activity tolerance;strength  -AV               User Key  (r) = Recorded By, (t) = Taken By, (c) = Cosigned By      Initials Name Provider Type    AV Matthew Lin OT Occupational Therapist                     Mobility/ADL's    No documentation.                  Obj/Interventions       Row Name 05/02/24 1109          Shoulder (Therapeutic Exercise)    Shoulder (Therapeutic Exercise) AROM (active  range of motion)  -AV     Shoulder AROM (Therapeutic Exercise) bilateral;flexion;scapular elevation;15 repititions  Right shoulder flexion deferred as patient bleeding from port-nurse notified.  -AV       Row Name 05/02/24 1109          Elbow/Forearm (Therapeutic Exercise)    Elbow/Forearm (Therapeutic Exercise) AROM (active range of motion)  -AV     Elbow/Forearm AROM (Therapeutic Exercise) bilateral;flexion;extension;supination;pronation;15 repititions  -AV       Row Name 05/02/24 1109          Motor Skills    Therapeutic Exercise shoulder;elbow/forearm  Performed in Semi-[employer]ider's/1.5 L O2.  Sats mid 90s throughout session.  -AV               User Key  (r) = Recorded By, (t) = Taken By, (c) = Cosigned By      Initials Name Provider Type    Matthew Schaefer, SAMSON Occupational Therapist                   Goals/Plan    No documentation.                  Clinical Impression       Row Name 05/02/24 1111          Pain Scale: FACES Pre/Post-Treatment    Pain: FACES Scale, Pretreatment 0-->no hurt  -AV     Posttreatment Pain Rating 0-->no hurt  -AV       Row Name 05/02/24 1111          Plan of Care Review    Plan of Care Reviewed With patient;family  -AV     Progress no change  -AV     Outcome Evaluation Patient having some bleeding from port upon arrival, family already notified nursing.  Patient agreeable to upper extremity AROM exercises in bed.  Continued OT indicated to remediate/compensate for deficits to maximize independence.  -AV       Row Name 05/02/24 1111          Vital Signs    O2 Delivery Pre Treatment nasal cannula  1.5  -AV     O2 Delivery Intra Treatment nasal cannula  1.5  -AV     O2 Delivery Post Treatment nasal cannula  1.5  -AV       Row Name 05/02/24 1111          Positioning and Restraints    Pre-Treatment Position in bed  -AV     Post Treatment Position bed  -AV     In Bed call light within reach;encouraged to call for assist;with family/caregiver;with nsg  -AV               User Key  (r) = Recorded  By, (t) = Taken By, (c) = Cosigned By      Initials Name Provider Type    Matthew Schaefer OT Occupational Therapist                   Outcome Measures       Row Name 05/02/24 1113          How much help from another is currently needed...    Putting on and taking off regular lower body clothing? 2  -AV     Bathing (including washing, rinsing, and drying) 2  -AV     Toileting (which includes using toilet bed pan or urinal) 1  -AV     Putting on and taking off regular upper body clothing 3  -AV     Taking care of personal grooming (such as brushing teeth) 3  -AV     Eating meals 3  -AV     AM-PAC 6 Clicks Score (OT) 14  -AV       Row Name 05/02/24 1113          Optimal Instrument    Bending/Stooping 5  -AV     Standing 5  -AV     Reaching 3  -AV               User Key  (r) = Recorded By, (t) = Taken By, (c) = Cosigned By      Initials Name Provider Type    Matthew Schaefer OT Occupational Therapist                    Occupational Therapy Education       Title: PT OT SLP Therapies (Done)       Topic: Occupational Therapy (Done)       Point: ADL training (Done)       Description:   Instruct learner(s) on proper safety adaptation and remediation techniques during self care or transfers.   Instruct in proper use of assistive devices.                  Learning Progress Summary             Patient Acceptance, E, VU by PANCHITO at 5/1/2024 1316                         Point: Home exercise program (Done)       Description:   Instruct learner(s) on appropriate technique for monitoring, assisting and/or progressing therapeutic exercises/activities.                  Learning Progress Summary             Patient Acceptance, E, VU by PANCHITO at 5/1/2024 1316                         Point: Precautions (Done)       Description:   Instruct learner(s) on prescribed precautions during self-care and functional transfers.                  Learning Progress Summary             Patient Acceptance, E, VU by PANCHITO at 5/1/2024 1316                          Point: Body mechanics (Done)       Description:   Instruct learner(s) on proper positioning and spine alignment during self-care, functional mobility activities and/or exercises.                  Learning Progress Summary             Patient Acceptance, E, VU by AV at 5/1/2024 1316                                         User Key       Initials Effective Dates Name Provider Type Discipline     06/16/21 -  Matthew Lin OT Occupational Therapist OT                  OT Recommendation and Plan  Planned Therapy Interventions (OT): activity tolerance training, BADL retraining, functional balance retraining, occupation/activity based interventions, patient/caregiver education/training, strengthening exercise, transfer/mobility retraining  Therapy Frequency (OT): 5 times/wk  Plan of Care Review  Plan of Care Reviewed With: patient, family  Progress: no change  Outcome Evaluation: Patient having some bleeding from port upon arrival, family already notified nursing.  Patient agreeable to upper extremity AROM exercises in bed.  Continued OT indicated to remediate/compensate for deficits to maximize independence.     Time Calculation:   Evaluation Complexity (OT)  Review Occupational Profile/Medical/Therapy History Complexity: expanded/moderate complexity  Assessment, Occupational Performance/Identification of Deficit Complexity: 1-3 performance deficits  Clinical Decision Making Complexity (OT): problem focused assessment/low complexity  Overall Complexity of Evaluation (OT): low complexity     Time Calculation- OT       Row Name 05/02/24 1114             Time Calculation- OT    OT Received On 05/02/24  -AV      OT Goal Re-Cert Due Date 05/10/24  -AV         Timed Charges    96530 - OT Therapeutic Exercise Minutes 8  -AV         Total Minutes    Timed Charges Total Minutes 8  -AV       Total Minutes 8  -AV                User Key  (r) = Recorded By, (t) = Taken By, (c) = Cosigned By      Initials Name Provider Type     Matthew Schaefer OT Occupational Therapist                  Therapy Charges for Today       Code Description Service Date Service Provider Modifiers Qty    52309017781 HC OT EVAL LOW COMPLEXITY 3 5/1/2024 Matthew Lin OT GO 1    72081236973 HC OT THER PROC EA 15 MIN 5/2/2024 Matthew Lin OT GO 1                 Matthew Lin OT  5/2/2024

## 2024-05-02 NOTE — THERAPY TREATMENT NOTE
Acute Care - Physical Therapy Treatment Note  [facility] [place]     Patient Name: Preston Wallis  : 1965  MRN: [id number]  Today's Date: 2024    Admit date: 2024     Referring Physician: Rebel Gonzalez MD     Surgery Date:* No surgery found *            Visit Dx:     ICD-10-CM ICD-9-CM   1. Difficulty walking  R26.2 719.7   2. Decreased activities of daily living (ADL)  Z78.9 V49.89     Patient Active Problem List   Diagnosis    Pneumonia due to COVID-19 virus    Polyneuropathy    Paroxysmal atrial fibrillation    Obstructive sleep apnea    MRSA pneumonia    Low back pain    Chronic diastolic heart failure    Allergies    COPD exacerbation    Chronic anticoagulation    Benign prostatic hyperplasia    Impaired mobility and endurance    Stage 3a chronic kidney disease    Iron deficiency anemia secondary to inadequate dietary iron intake    Vitamin D deficiency    Class 3 severe obesity with serious comorbidity in adult    Lower extremity edema    Elevated alkaline phosphatase level    Venous insufficiency (chronic) (peripheral)    Tobacco abuse, in remission    History of Pseudomonas pneumonia    Chronic dyspnea    Gastroesophageal reflux disease    Bronchiectasis without complication    ERIC (acute kidney injury)    Altered mental status    Hyperlipidemia    Luetscher's syndrome    Neurogenic bladder    Class 1 obesity    Pneumonia of both lungs due to Pseudomonas species    Seizures    Primary osteoarthritis of left knee    Other constipation    Chronic obstructive pulmonary disease    Type 2 DM with CKD stage 3 and hypertension    Essential hypertension    Stage 3b chronic kidney disease    Annual physical exam    Long-term use of high-risk medication    Personal history of PE (pulmonary embolism)    Encounter for aftercare for healing closed traumatic fracture of left femur    Chronic pain of left knee    Primary osteoarthritis of right knee    Sepsis    Bronchiectasis with acute exacerbation     "Bacterial pneumonia    Anemia    Closed fracture of left tibial plateau    Septic joint    Septic arthritis    Skin ulcer of left heel, limited to breakdown of skin    Ulcer of left foot, limited to breakdown of skin    Left foot pain    Wheezing    Acute on chronic respiratory failure with hypercapnia    Chronic respiratory failure with hypoxia and hypercapnia    Acute hypoxic on chronic hypercapnic respiratory failure     Past Medical History:   Diagnosis Date    Age-related cognitive decline     Allergic contact dermatitis     Allergies     Anemia     Bedbound     11/2023 \"MY LEG MUSCLES STOPPED WORKING\"    Bronchiectasis with acute lower respiratory infection     Charcot foot due to diabetes mellitus 09/10/2013    Chronic diastolic (congestive) heart failure     Chronic kidney disease     Chronic respiratory failure with hypoxia     Closed supracondylar fracture of femur 01/12/2022    COPD (chronic obstructive pulmonary disease)     Deep vein thrombosis (DVT) of lower extremity associated with air travel 01/13/2023    Dependence on supplemental oxygen     Eczema     Erectile dysfunction     due to organic reasons    Essential (primary) hypertension     Fracture     closed fracture of other tarsal and metatarsal bones    Fracture of proximal humerus 01/13/2023    GERD without esophagitis     High risk medication use     Hypercholesteremia     Hypomagnesemia     Infected stasis ulcer of left lower extremity 01/13/2023    Insomnia     Low back pain     Major depressive disorder     Morbid (severe) obesity due to excess calories     MRSA pneumonia     Muscle weakness     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 without evidence of necrosis     Obstructive sleep apnea (adult) (pediatric)     On home O2     REPORTS WEARING 2L/NC AAT    Other forms of dyspnea     Other long term (current) drug therapy     Other specified noninfective gastroenteritis and colitis     Other " spondylosis, lumbar region     Pain in both knees     Paroxysmal atrial fibrillation     Peripheral neuropathy     attributed to type 2 diabetes    Pneumonia, unspecified organism     Polyneuropathy     Rash and other nonspecific skin eruption     Syncope and collapse     Tachycardia     Tinnitus 01/13/2023    Type 1 diabetes mellitus with diabetic chronic kidney disease     Type 2 diabetes mellitus     Unspecified fall, initial encounter     Urinary retention      Past Surgical History:   Procedure Laterality Date    CHOLECYSTECTOMY      CYSTOSCOPY      FEMUR SURGERY Left     Shravan placed    KNEE SURGERY Left     OTHER SURGICAL HISTORY Left     venous port, REMOVED    PORTACATH PLACEMENT Right     TIBIAL PLATEAU OPEN REDUCTION INTERNAL FIXATION Left 12/22/2023    Procedure: TIBIAL PLATEAU OPEN REDUCTION INTERNAL FIXATION;  Surgeon: Hugo Kline MD;  Location: [facility] OR;  Service: Orthopedics;  Laterality: Left;    TONSILLECTOMY AND ADENOIDECTOMY       PT Assessment (Last 12 Hours)       PT Evaluation and Treatment       Row Name 05/02/24 1340          Physical Therapy Time and Intention    Subjective Information no complaints (P)   -     Document Type therapy note (daily note) (P)   -     Mode of Treatment individual therapy;physical therapy (P)   -     Patient Effort adequate (P)   -     Symptoms Noted During/After Treatment none (P)   -       Row Name 05/02/24 1340          General Information    Patient Profile Reviewed yes (P)   -     Patient Observations alert;cooperative;agree to therapy (P)   -     Barriers to Rehab none identified (P)   -       Row Name 05/02/24 1340          Pain    Pretreatment Pain Rating 0/10 - no pain (P)   -     Posttreatment Pain Rating 0/10 - no pain (P)   -       Row Name 05/02/24 1340          Cognition    Orientation Status (Cognition) oriented x 3 (P)   -       Row Name 05/02/24 1340          Bed Mobility    Comment, (Bed Mobility) Patient  deferred bed mobility. (P)   -       Row Name 05/02/24 1340          Transfers    Comment, (Transfers) Patient deferred transfers. (P)   -       Row Name 05/02/24 1340          Gait/Stairs (Locomotion)    Patient was able to Ambulate no, other medical factors prevent ambulation (P)   -     Reason Patient was unable to Ambulate Non-Ambulatory at Baseline (P)   -     Distance in Feet (Gait) 0 (P)   -       Row Name 05/02/24 1340          Safety Issues, Functional Mobility    Impairments Affecting Function (Mobility) balance;endurance/activity tolerance;strength;range of motion (ROM) (P)   -       Row Name 05/02/24 1340          Balance    Comment, Balance No balance assessment today. (P)   -       Row Name 05/02/24 1340          Motor Skills    Therapeutic Exercise other (see comments) (P)   All CHICHI x10: glute sets, quad sets, heel slides, AAROM knee flexion/extension, AAROM hip flexion/extension/abduction/adduction; AROM ankle PF/DF  -       Row Name             Wound 12/22/23 1322 Left posterior heel Pressure Injury    Wound - Properties Group Placement Date: 12/22/23  -LH Placement Time: 1322  -LH Side: Left  -LH Orientation: posterior  -LH Location: heel  -LH Primary Wound Type: Pressure inj  -LH    Retired Wound - Properties Group Placement Date: 12/22/23  -LH Placement Time: 1322  -LH Side: Left  -LH Orientation: posterior  -LH Location: heel  -LH Primary Wound Type: Pressure inj  -LH    Retired Wound - Properties Group Date first assessed: 12/22/23  -LH Time first assessed: 1322  -LH Side: Left  -LH Location: heel  -LH Primary Wound Type: Pressure inj  -LH      Row Name             Wound 02/16/24 1700 Left posterior foot Pressure Injury    Wound - Properties Group Placement Date: 02/16/24  -AG Placement Time: 1700  -AG Side: Left  -AG Orientation: posterior  -AG Location: foot  -AG Primary Wound Type: Pressure inj  -AG    Retired Wound - Properties Group Placement Date: 02/16/24  -AG Placement  Time: 1700  -AG Side: Left  -AG Orientation: posterior  -AG Location: foot  -AG Primary Wound Type: Pressure inj  -AG    Retired Wound - Properties Group Date first assessed: 02/16/24  -AG Time first assessed: 1700  -AG Side: Left  -AG Location: foot  -AG Primary Wound Type: Pressure inj  -AG      Row Name             Wound 04/29/24 2355 Left posterior other (see comments) Pressure Injury    Wound - Properties Group Placement Date: 04/29/24  -ROSE MARY Placement Time: 2355  -ROSE MARY Present on Original Admission: Y  -ROSE MARY Side: Left  -ROSE MARY Orientation: posterior  -ROSE MARY Location: other (see comments)  -ROSE MARY, Left Buttock  Primary Wound Type: Pressure inj  -ROSE MARY Additional Comments: no bleeding observed, cleansed with normal saline and mepilex applied  -ROSE MARY Wound Outcome: Unknown  -ROSE MARY    Retired Wound - Properties Group Placement Date: 04/29/24  -ROSE MARY Placement Time: 2355  -ROSE MARY Present on Original Admission: Y  -ROSE MARY Side: Left  -ROSE MARY Orientation: posterior  -ROSE MARY Location: other (see comments)  -ROSE MARY, Left Buttock  Primary Wound Type: Pressure inj  -ROSE MARY Additional Comments: no bleeding observed, cleansed with normal saline and mepilex applied  -ROSE MARY Wound Outcome: Unknown  -ROSE MARY    Retired Wound - Properties Group Date first assessed: 04/29/24  -ROSE MARY Time first assessed: 2355  -ROSE MARY Present on Original Admission: Y  -ROSE MARY Side: Left  -ROSE MARY Location: other (see comments)  -ROSE MARY, Left Buttock  Primary Wound Type: Pressure inj  -ROSE MARY Additional Comments: no bleeding observed, cleansed with normal saline and mepilex applied  -ROSE MARY Wound Outcome: Unknown  -ROSE MARY      Row Name             Wound 04/30/24 0730 Bilateral posterior coccyx Pressure Injury    Wound - Properties Group Placement Date: 04/30/24  -TB Placement Time: 0730  -TB, COVERED WITH MEPILEX PER HS SHIFT. PEELED & SEALED THIS AM. PICTURE TAKEN.  Side: Bilateral  -TB Orientation: posterior  -TB Location: coccyx  -TB Primary Wound Type: Pressure inj  -TB    Retired Wound - Properties Group Placement Date: 04/30/24   -TB Placement Time: 0730 -TB, COVERED WITH MEPILEX PER HS SHIFT. PEELED & SEALED THIS AM. PICTURE TAKEN.  Side: Bilateral  -TB Orientation: posterior  -TB Location: coccyx  -TB Primary Wound Type: Pressure inj  -TB    Retired Wound - Properties Group Date first assessed: 04/30/24  -TB Time first assessed: 0730 -TB, COVERED WITH MEPILEX PER HS SHIFT. PEELED & SEALED THIS AM. PICTURE TAKEN.  Side: Bilateral  -TB Location: coccyx  -TB Primary Wound Type: Pressure inj  -TB      Row Name 05/02/24 1340          Vital Signs    O2 Delivery Pre Treatment nasal cannula (P)   1.5L  -MF     O2 Delivery Intra Treatment nasal cannula (P)   1.5L  -MF     O2 Delivery Post Treatment nasal cannula (P)   1.5L  -MF       Row Name 05/02/24 1340          Positioning and Restraints    Pre-Treatment Position in bed (P)   -MF     Post Treatment Position bed (P)   -MF     In Bed supine;call light within reach;encouraged to call for assist;exit alarm on (P)   -MF       Row Name 05/02/24 1340          Progress Summary (PT)    Progress Toward Functional Goals (PT) progress toward functional goals is gradual (P)   -MF     Daily Progress Summary (PT) Patient tolerated exercises well today. He declined all transfers today as he reported that he had just moved earlier today and did not want to do it right now. Plan to continue PT to work on independence with transfers as he is non-ambulatory at baseline. (P)   -MF     Barriers to Overall Progress (PT) No barriers identified. (P)   -MF               User Key  (r) = Recorded By, (t) = Taken By, (c) = Cosigned By      Initials Name Provider Type    Carmen Wise RN Registered Nurse    Cindy Lorenzo, RN Registered Nurse    TB Mita Grubbs, RN Registered Nurse    Lino Angelo, RN Registered Nurse    Romeo Cheng, PT Student PT Student                    Physical Therapy Education       Title: PT OT SLP Therapies (Done)       Topic: Physical Therapy (Done)       Point: Mobility  training (Done)       Learning Progress Summary             Patient Acceptance, E, VU by PANCHITO at 5/1/2024 1316    Acceptance, E,TB, VU by  at 4/30/2024 3302                                         User Key       Initials Effective Dates Name Provider Type Discipline    PANCHITO 06/16/21 -  Matthew Lin, OT Occupational Therapist OT     10/12/23 -  Pawan Hernandez, PT Student PT Student PT                  PT Recommendation and Plan     Progress Summary (PT)  Progress Toward Functional Goals (PT): (P) progress toward functional goals is gradual  Daily Progress Summary (PT): (P) Patient tolerated exercises well today. He declined all transfers today as he reported that he had just moved earlier today and did not want to do it right now. Plan to continue PT to work on independence with transfers as he is non-ambulatory at baseline.  Barriers to Overall Progress (PT): (P) No barriers identified.   Outcome Measures       Row Name 05/02/24 1345 04/30/24 1400          How much help from another person do you currently need...    Turning from your back to your side while in flat bed without using bedrails? 2 (P)   -MF 3  -GILMA (r) MH (t) GILMA (c)     Moving from lying on back to sitting on the side of a flat bed without bedrails? 2 (P)   -MF 3  -GILMA (r) MH (t) GILMA (c)     Moving to and from a bed to a chair (including a wheelchair)? 1 (P)   -MF 1  -GILMA (r) MH (t) GILMA (c)     Standing up from a chair using your arms (e.g., wheelchair, bedside chair)? 1 (P)   -MF 1  -GILMA (r) MH (t) GILMA (c)     Climbing 3-5 steps with a railing? 1 (P)   -MF 1  -GILMA (r) MH (t) GILMA (c)     To walk in hospital room? 1 (P)   -MF 1  -GILMA (r) MH (t) GILMA (c)     AM-PAC 6 Clicks Score (PT) 8 (P)   -MF 10  -GILMA (r) MH (t)     Highest Level of Mobility Goal 3 --> Sit at edge of bed (P)   -MF 4 --> Transfer to chair/commode  -GILMA (r) MH (t)        Functional Assessment    Outcome Measure Options AM-PAC 6 Clicks Basic Mobility (PT) (P)   -MF --               User Key  (r) =  Recorded By, (t) = Taken By, (c) = Cosigned By      Initials Name Provider Type    Octavio Page, PT Physical Therapist    Pawan Quispe, PT Student PT Student    Romeo Cheng, PT Student PT Student                     Time Calculation:    PT Charges       Row Name 05/02/24 1340 05/02/24 1339          Time Calculation    PT Received On -- 05/02/24 (P)   -MF        Timed Charges    94762 - PT Therapeutic Exercise Minutes 10 (P)   -MF --        Total Minutes    Timed Charges Total Minutes 10 (P)   -MF --      Total Minutes 10 (P)   -MF --               User Key  (r) = Recorded By, (t) = Taken By, (c) = Cosigned By      Initials Name Provider Type    Romeo Cheng, PT Student PT Student                  Therapy Charges for Today       Code Description Service Date Service Provider Modifiers Qty    [id number]  PT THER PROC EA 15 MIN 5/2/2024 Romeo Vaca, PT Student GP 1            PT G-Codes  Outcome Measure Options: (P) AM-PAC 6 Clicks Basic Mobility (PT)  AM-PAC 6 Clicks Score (PT): (P) 8  AM-PAC 6 Clicks Score (OT): 14    Romeo Vcaa PT Student  5/2/2024

## 2024-05-02 NOTE — PROGRESS NOTES
Pulmonary / Critical Care Progress Note      Patient Name: Preston Wallis  : 1965  MRN: [id number]  Primary Care Physician:  Kimmy Riley MD  Date of admission: 2024    Subjective   Subjective   Follow-up for hypoxic and hypercapnic respiratory failure, severe COPD, obesity hypoventilation syndrome, noncompliance of NIV    Over past 24 hours: Remained on Eliquis.  Continued Brovana, Pulmicort, and MOIZ nebs.  Remains on IV Zosyn.    No acute events overnight.  Could not wear NIPPV at night.  Says it did not blow air.  Used oxygen overnight.    This morning,   On 2 L nasal cannula  Lying in bed  Denies any chest pain or chest tightness  Denies cough  No fever or chills  Diuresing well  - 3.7 L urine output, serum creatinine improved but still high.    Objective   Objective     Vitals:   Temp:  [97.2 °F (36.2 °C)-98.4 °F (36.9 °C)] 97.5 °F (36.4 °C)  Heart Rate:  [67-77] 76  Resp:  [16-20] 18  BP: (122-153)/(40-72) 140/70  Flow (L/min):  [1] 1  Physical Exam   Vital Signs Reviewed   General:  WDWN, Alert, NAD.  Chronically ill-appearing male, lying in bed  HEENT:  PERRL, EOMI.  OP, nares clear, no sinus tenderness  Neck:  Supple, no JVD, no thyromegaly  Chest: Improved aeration with bibasilar crackles and rhonchi, diminished right chest, equal rise and fall bilaterally, on 2 L  CV: RRR, no MGR, pulses 2+, equal.  Abd:  Soft, NT, ND, + BS, no HSM, obese  EXT:  no clubbing, no cyanosis, BLE edema  Neuro:  A&Ox3, CN grossly intact, no focal deficits.  Skin: No rashes or lesions noted      Result Review    Result Review:  I have personally reviewed the results from the time of this admission to 2024 06:59 EDT and agree with these findings:  [x]  Laboratory  [x]  Microbiology  [x]  Radiology  []  EKG/Telemetry   []  Cardiology/Vascular   []  Pathology  []  Old records  []  Other:  Most notable findings include:         Lab 24  0008 24  0743 24  0603 24  0140 24  0016    WBC 12.33* 9.41  --   --  15.37*   HEMOGLOBIN 7.9* 6.8*  --   --  7.8*   HEMATOCRIT 25.9* 22.6*  --   --  26.6*   PLATELETS 255 217  --   --  293   SODIUM 141 151* 138  --   --    SODIUM, ARTERIAL  --   --   --  133.8*  --    POTASSIUM 3.7 3.1* 4.7  --   --    CHLORIDE 96* 100 94*  --   --    CO2 37.4* 30.1* 37.1*  --   --    BUN 39* 38* 46*  --   --    CREATININE 2.07* 1.90* 2.45*  --   --    GLUCOSE 159* 97 157*  --   --    GLUCOSE, ARTERIAL  --   --   --  128*  --    CALCIUM 8.9 6.8* 8.6  --   --    PHOSPHORUS 3.6 2.5  --   --   --    ALBUMIN 2.8*  --   --   --   --        Assessment & Plan   Assessment / Plan     Active Hospital Problems:  Active Hospital Problems    Diagnosis    • **Acute hypoxic on chronic hypercapnic respiratory failure      Impression:   Acute on chronic hypoxemic and hypercapnic respiratory failure requiring NIPPV  Severe COPD without exacerbation, FEV1 26%  Obesity hypoventilation syndrome  Acute on chronic decompensated congestive diastolic heart failure  Acute cardiogenic pulmonary edema  Recurrent Pseudomonas pneumonia, resistant to Levaquin  Altered mental status  CO2 narcosis  Anasarca  Anemia  CKD  Hypocalcemia  Medical noncompliance of NIV  History of MILADY  Tobacco abuse in remission    Plan:   -On 2 L nasal cannula.  Continue to maintain SpO2 greater than 90%.  Patient wears 2-3 at baseline   -Encourage NIPPV as needed with naps and at nighttime   -Patient s/p thoracentesis on 4/29 with 1 L removed.  Pleural fluid transudative per lights criteria likely consistent with CHF   -Continue with Bumex 2 mg orally twice daily.  Needs to be negative balance every day.Monitor renal function closely.  Serum creatinine fluctuating.  -Continue albuterol, Brovana, Pulmicort, and MOIZ nebs   -Continue Zosyn course for total of 7 days for pneumonia   -Continue Eliquis for A-fib   -Continue Jardiance 10 mg oral daily   -RT  on board.  Appreciate assistance.  He says his astral vent is  not blowing air and did not use it last night.  RT  to evaluate.    Last admission we arranged for NIPPV and chest vest.  Can use home NIPPV and can use chest vest here per protocol.  Per compliance reports he has been noncompliant since he has been discharged          DVT prophylaxis:  Medical DVT prophylaxis orders are present.    CODE STATUS:   Code Status (Patient has no pulse and is not breathing): CPR (Attempt to Resuscitate)  Medical Interventions (Patient has pulse or is breathing): Full Support    I personally reviewed pertinent labs, imaging and provider notes. Discussed with bedside nurse and will discuss with primary service.     Electronically signed by Martín Rivera MD, 5/2/2024, 06:59 EDT.

## 2024-05-02 NOTE — CONSULTS
Right upper chest port is accessed with a non-coring needle. Insertion site covered with transparent dressing. There is a moderate amount of blood under the dressing and trailing out of the right corner of the dressing. The blood appears to be coming from the insertion site, but is obscured by the biopatch.    I placed a new IV, restarted the zosyn infusion in the new iv, then flushed the Port.    I removed the dressing then de-accessed the port. With sterile gauze, I applied pressure over the bleeding area. Then, when the bleeding was stopped, I scrubbed the area with chlorhexidine to clean the area. There continued to be a slow ooze of blood from the puncture site. I applied more sterile gauze and applied a sterile, transparent, occlusive dressing.     RN updated.    Romeo Garner RN

## 2024-05-02 NOTE — CASE MANAGEMENT/SOCIAL WORK
RT CM spoke with patient regarding issues with Astral. Device is in working order. Patient did not have the device on for any significant length of time. He states the room is too hot so he can't get a deep breath. He says the device settings were working well for him at home. No changes have been made to the pressures this admission.  Mr. Wallis does not want changes made at this time since he tolerates these pressures at home.  He did get his smartvest on 4/20 and states he was using it twice daily.  He is pleased with the device and says he is able to mobilize secretions after using it.

## 2024-05-03 ENCOUNTER — READMISSION MANAGEMENT (OUTPATIENT)
Dept: CALL CENTER | Facility: HOSPITAL | Age: 59
End: 2024-05-03
Payer: COMMERCIAL

## 2024-05-03 VITALS
OXYGEN SATURATION: 100 % | TEMPERATURE: 97.7 F | SYSTOLIC BLOOD PRESSURE: 137 MMHG | WEIGHT: 275.35 LBS | HEIGHT: 69 IN | RESPIRATION RATE: 18 BRPM | DIASTOLIC BLOOD PRESSURE: 42 MMHG | HEART RATE: 73 BPM | BODY MASS INDEX: 40.78 KG/M2

## 2024-05-03 PROBLEM — R41.89 IMPAIRED COGNITION: Status: ACTIVE | Noted: 2024-05-03

## 2024-05-03 LAB
ALBUMIN SERPL-MCNC: 2.7 G/DL (ref 3.5–5.2)
ALP SERPL-CCNC: 113 U/L (ref 39–117)
ALT SERPL W P-5'-P-CCNC: 19 U/L (ref 1–41)
ANION GAP SERPL CALCULATED.3IONS-SCNC: 8.3 MMOL/L (ref 5–15)
AST SERPL-CCNC: 22 U/L (ref 1–40)
BASOPHILS # BLD AUTO: 0.07 10*3/MM3 (ref 0–0.2)
BASOPHILS NFR BLD AUTO: 0.6 % (ref 0–1.5)
BILIRUB CONJ SERPL-MCNC: <0.2 MG/DL (ref 0–0.3)
BILIRUB INDIRECT SERPL-MCNC: ABNORMAL MG/DL
BILIRUB SERPL-MCNC: 0.2 MG/DL (ref 0–1.2)
BUN SERPL-MCNC: 29 MG/DL (ref 6–20)
BUN/CREAT SERPL: 13.1 (ref 7–25)
CALCIUM SPEC-SCNC: 9 MG/DL (ref 8.6–10.5)
CHLORIDE SERPL-SCNC: 95 MMOL/L (ref 98–107)
CO2 SERPL-SCNC: 36.7 MMOL/L (ref 22–29)
CREAT SERPL-MCNC: 2.21 MG/DL (ref 0.76–1.27)
DEPRECATED RDW RBC AUTO: 48.6 FL (ref 37–54)
EGFRCR SERPLBLD CKD-EPI 2021: 33.7 ML/MIN/1.73
EOSINOPHIL # BLD AUTO: 0.49 10*3/MM3 (ref 0–0.4)
EOSINOPHIL NFR BLD AUTO: 4.4 % (ref 0.3–6.2)
ERYTHROCYTE [DISTWIDTH] IN BLOOD BY AUTOMATED COUNT: 14.6 % (ref 12.3–15.4)
GLUCOSE BLDC GLUCOMTR-MCNC: 135 MG/DL (ref 70–99)
GLUCOSE BLDC GLUCOMTR-MCNC: 98 MG/DL (ref 70–99)
GLUCOSE SERPL-MCNC: 123 MG/DL (ref 65–99)
HCT VFR BLD AUTO: 27.1 % (ref 37.5–51)
HGB BLD-MCNC: 8.2 G/DL (ref 13–17.7)
IMM GRANULOCYTES # BLD AUTO: 0.04 10*3/MM3 (ref 0–0.05)
IMM GRANULOCYTES NFR BLD AUTO: 0.4 % (ref 0–0.5)
LYMPHOCYTES # BLD AUTO: 1.55 10*3/MM3 (ref 0.7–3.1)
LYMPHOCYTES NFR BLD AUTO: 13.9 % (ref 19.6–45.3)
MAGNESIUM SERPL-MCNC: 1.7 MG/DL (ref 1.6–2.6)
MCH RBC QN AUTO: 27.6 PG (ref 26.6–33)
MCHC RBC AUTO-ENTMCNC: 30.3 G/DL (ref 31.5–35.7)
MCV RBC AUTO: 91.2 FL (ref 79–97)
MONOCYTES # BLD AUTO: 1.03 10*3/MM3 (ref 0.1–0.9)
MONOCYTES NFR BLD AUTO: 9.2 % (ref 5–12)
NEUTROPHILS NFR BLD AUTO: 7.99 10*3/MM3 (ref 1.7–7)
NEUTROPHILS NFR BLD AUTO: 71.5 % (ref 42.7–76)
NRBC BLD AUTO-RTO: 0 /100 WBC (ref 0–0.2)
PHOSPHATE SERPL-MCNC: 4.2 MG/DL (ref 2.5–4.5)
PLATELET # BLD AUTO: 296 10*3/MM3 (ref 140–450)
PMV BLD AUTO: 8.8 FL (ref 6–12)
POTASSIUM SERPL-SCNC: 3.5 MMOL/L (ref 3.5–5.2)
PROT SERPL-MCNC: 6.5 G/DL (ref 6–8.5)
RBC # BLD AUTO: 2.97 10*6/MM3 (ref 4.14–5.8)
SODIUM SERPL-SCNC: 140 MMOL/L (ref 136–145)
WBC NRBC COR # BLD AUTO: 11.17 10*3/MM3 (ref 3.4–10.8)

## 2024-05-03 PROCEDURE — 82948 REAGENT STRIP/BLOOD GLUCOSE: CPT | Performed by: INTERNAL MEDICINE

## 2024-05-03 PROCEDURE — 94799 UNLISTED PULMONARY SVC/PX: CPT

## 2024-05-03 PROCEDURE — 83735 ASSAY OF MAGNESIUM: CPT | Performed by: FAMILY MEDICINE

## 2024-05-03 PROCEDURE — 80076 HEPATIC FUNCTION PANEL: CPT | Performed by: FAMILY MEDICINE

## 2024-05-03 PROCEDURE — 84100 ASSAY OF PHOSPHORUS: CPT | Performed by: INTERNAL MEDICINE

## 2024-05-03 PROCEDURE — 25010000002 PIPERACILLIN SOD-TAZOBACTAM PER 1 G: Performed by: INTERNAL MEDICINE

## 2024-05-03 PROCEDURE — 80048 BASIC METABOLIC PNL TOTAL CA: CPT | Performed by: INTERNAL MEDICINE

## 2024-05-03 PROCEDURE — 99239 HOSP IP/OBS DSCHRG MGMT >30: CPT | Performed by: FAMILY MEDICINE

## 2024-05-03 PROCEDURE — 99233 SBSQ HOSP IP/OBS HIGH 50: CPT | Performed by: INTERNAL MEDICINE

## 2024-05-03 PROCEDURE — 85025 COMPLETE CBC W/AUTO DIFF WBC: CPT | Performed by: FAMILY MEDICINE

## 2024-05-03 PROCEDURE — 94664 DEMO&/EVAL PT USE INHALER: CPT

## 2024-05-03 PROCEDURE — 94761 N-INVAS EAR/PLS OXIMETRY MLT: CPT

## 2024-05-03 RX ORDER — TOBRAMYCIN INHALATION SOLUTION 300 MG/5ML
300 INHALANT RESPIRATORY (INHALATION)
Qty: 260 ML | Refills: 0 | Status: SHIPPED | OUTPATIENT
Start: 2024-05-03 | End: 2024-05-29

## 2024-05-03 RX ORDER — TAMSULOSIN HYDROCHLORIDE 0.4 MG/1
0.4 CAPSULE ORAL DAILY
Qty: 30 CAPSULE | Refills: 0 | Status: SHIPPED | OUTPATIENT
Start: 2024-05-04 | End: 2024-05-06 | Stop reason: SDUPTHER

## 2024-05-03 RX ADMIN — PIPERACILLIN SODIUM AND TAZOBACTAM SODIUM 3.38 G: 3; .375 INJECTION, POWDER, LYOPHILIZED, FOR SOLUTION INTRAVENOUS at 05:43

## 2024-05-03 RX ADMIN — FERROUS SULFATE TAB 325 MG (65 MG ELEMENTAL FE) 325 MG: 325 (65 FE) TAB at 08:39

## 2024-05-03 RX ADMIN — BUMETANIDE 2 MG: 1 TABLET ORAL at 08:40

## 2024-05-03 RX ADMIN — ARFORMOTEROL TARTRATE 15 MCG: 15 SOLUTION RESPIRATORY (INHALATION) at 06:18

## 2024-05-03 RX ADMIN — SENNOSIDES AND DOCUSATE SODIUM 2 TABLET: 50; 8.6 TABLET ORAL at 08:40

## 2024-05-03 RX ADMIN — ASPIRIN 81 MG: 81 TABLET, COATED ORAL at 08:40

## 2024-05-03 RX ADMIN — FAMOTIDINE 40 MG: 20 TABLET ORAL at 08:40

## 2024-05-03 RX ADMIN — EMPAGLIFLOZIN 10 MG: 10 TABLET, FILM COATED ORAL at 08:40

## 2024-05-03 RX ADMIN — Medication 2000 UNITS: at 08:40

## 2024-05-03 RX ADMIN — DULOXETINE HYDROCHLORIDE 30 MG: 30 CAPSULE, DELAYED RELEASE ORAL at 08:40

## 2024-05-03 RX ADMIN — LEVETIRACETAM 500 MG: 500 TABLET, FILM COATED ORAL at 08:40

## 2024-05-03 RX ADMIN — CARVEDILOL 25 MG: 25 TABLET, FILM COATED ORAL at 08:40

## 2024-05-03 RX ADMIN — TOBRAMYCIN 300 MG: 300 SOLUTION RESPIRATORY (INHALATION) at 06:19

## 2024-05-03 RX ADMIN — Medication 10 ML: at 12:03

## 2024-05-03 RX ADMIN — TAMSULOSIN HYDROCHLORIDE 0.4 MG: 0.4 CAPSULE ORAL at 08:40

## 2024-05-03 RX ADMIN — BUDESONIDE 0.5 MG: 0.5 INHALANT RESPIRATORY (INHALATION) at 06:18

## 2024-05-03 RX ADMIN — APIXABAN 5 MG: 5 TABLET, FILM COATED ORAL at 12:03

## 2024-05-03 NOTE — OUTREACH NOTE
Prep Survey      Flowsheet Row Responses   [facility] patient discharged from? [place]   Is LACE score < 7 ? No   Eligibility [facility] [place]   Date of Admission 04/28/24   Date of Discharge 05/03/24   Discharge Disposition Home-Health Care Svc   Discharge diagnosis Acute hypoxic on chronic hypercapnic respiratory failure, PNA   Does the patient have one of the following disease processes/diagnoses(primary or secondary)? Pneumonia   Does the patient have Home health ordered? Yes   What is the Home health agency?  IV abx from [facility]   Is there a DME ordered? No   Prep survey completed? Yes            Romelia RODARTE - Registered Nurse

## 2024-05-03 NOTE — PROGRESS NOTES
Pulmonary / Critical Care Progress Note      Patient Name: Preston Wallis  : 1965  MRN: [id number]  Primary Care Physician:  Kimmy Riley MD  Date of admission: 2024    Subjective   Subjective   Follow-up for hypoxic and hypercapnic respiratory failure, severe COPD, obesity hypoventilation syndrome, noncompliance of NIV    Over past 24 hours: Remained on Eliquis.  Continued Brovana, Pulmicort, and MOIZ nebs.  Remains on IV Zosyn.    No acute events overnight.  Did not wear NIPPV last night.    This morning,   On 2 L nasal cannula  Lying in bed  Denies any chest pain or chest tightness  Denies cough  No fever or chills  Diuresing well  - 3.7 L urine output, serum creatinine improved but still high.    Objective   Objective     Vitals:   Temp:  [97.3 °F (36.3 °C)-99 °F (37.2 °C)] 98.5 °F (36.9 °C)  Heart Rate:  [73-86] 75  Resp:  [16-20] 18  BP: (141-176)/(54-68) 172/68  Flow (L/min):  [1-2] 2  Physical Exam   Vital Signs Reviewed   General:  WDWN, Alert, NAD.  Chronically ill-appearing male, lying in bed  HEENT:  PERRL, EOMI.  OP, nares clear, no sinus tenderness  Neck:  Supple, no JVD, no thyromegaly  Chest: Improved aeration with bibasilar crackles and rhonchi, diminished right chest, equal rise and fall bilaterally, on 2 L  CV: RRR, no MGR, pulses 2+, equal.  Abd:  Soft, NT, ND, + BS, no HSM, obese  EXT:  no clubbing, no cyanosis, BLE edema  Neuro:  A&Ox3, CN grossly intact, no focal deficits.  Skin: No rashes or lesions noted      Result Review    Result Review:  I have personally reviewed the results from the time of this admission to 5/3/2024 06:45 EDT and agree with these findings:  [x]  Laboratory  [x]  Microbiology  [x]  Radiology  []  EKG/Telemetry   []  Cardiology/Vascular   []  Pathology  []  Old records  []  Other:  Most notable findings include:         Lab 24  0437 24  0751 24  0008 24  0743 24  0603 24  0140 24  0016   WBC 11.17*  --  12.33*  9.41  --   --  15.37*   HEMOGLOBIN 8.2*  --  7.9* 6.8*  --   --  7.8*   HEMATOCRIT 27.1*  --  25.9* 22.6*  --   --  26.6*   PLATELETS 296  --  255 217  --   --  293   SODIUM 140 143 141 151* 138  --   --    SODIUM, ARTERIAL  --   --   --   --   --  133.8*  --    POTASSIUM 3.5 3.8 3.7 3.1* 4.7  --   --    CHLORIDE 95* 98 96* 100 94*  --   --    CO2 36.7* 37.9* 37.4* 30.1* 37.1*  --   --    BUN 29* 33* 39* 38* 46*  --   --    CREATININE 2.21* 1.87* 2.07* 1.90* 2.45*  --   --    GLUCOSE 123* 99 159* 97 157*  --   --    GLUCOSE, ARTERIAL  --   --   --   --   --  128*  --    CALCIUM 9.0 8.9 8.9 6.8* 8.6  --   --    PHOSPHORUS 4.2 3.6 3.6 2.5  --   --   --    TOTAL PROTEIN 6.5  --   --   --   --   --   --    ALBUMIN 2.7* 2.8* 2.8*  --   --   --   --        Assessment & Plan   Assessment / Plan     Active Hospital Problems:  Active Hospital Problems    Diagnosis    • **Acute hypoxic on chronic hypercapnic respiratory failure      Impression:   Acute on chronic hypoxemic and hypercapnic respiratory failure requiring NIPPV  Severe COPD without exacerbation, FEV1 26%  Obesity hypoventilation syndrome  Acute on chronic decompensated congestive diastolic heart failure  Acute cardiogenic pulmonary edema  Recurrent Pseudomonas pneumonia, resistant to Levaquin  Altered mental status  CO2 narcosis  Anasarca  Anemia  CKD  Hypocalcemia  Medical noncompliance of NIV  History of MILADY  Tobacco abuse in remission    Plan:   Continue diuretics with Bumex.  [place] Pulmicort  Sputum culture growing Pseudomonas and strep pyogenes.  Currently on IV Zosyn.  Adjust antibiotics based on cultures.  May need fluoroquinolone to go home with.    Last admission we arranged for NIPPV and chest vest.  Can use home NIPPV and can use chest vest here per protocol.  Per compliance reports he has been noncompliant since he has been discharged          DVT prophylaxis:  Medical DVT prophylaxis orders are present.    CODE STATUS:   Code Status (Patient has  no pulse and is not breathing): CPR (Attempt to Resuscitate)  Medical Interventions (Patient has pulse or is breathing): Full Support    I personally reviewed pertinent labs, imaging and provider notes. Discussed with bedside nurse and will discuss with primary service.     Electronically signed by Martín Rivera MD, 5/3/2024, 06:45 EDT.

## 2024-05-03 NOTE — DISCHARGE SUMMARY
[facility]         HOSPITALIST  DISCHARGE SUMMARY    Patient Name: Preston Wallis  : 1965  MRN: [id number]    Date of Admission: 2024  Date of Discharge:  5/3/2024    Primary Care Physician: Kimmy Riley MD    Consults       Date and Time Order Name Status Description    2024 11:48 PM Inpatient Pulmonology Consult Completed     4/15/2024  4:23 PM Inpatient Pulmonology Consult Completed     4/15/2024  3:49 AM Inpatient Podiatry Consult Completed             Active and Resolved Hospital Problems:    Healthcare associated pneumonia  Pseudomonas and strep pyogenes involving the right lower lobe.  Right parapneumonic effusion.  S/p thoracentesis 1 L red tinged fluid   Acute on chronic hypoxemic hypercapnic respiratory failure  Obstructive sleep apnea on home astral.  Noncompliance with home NIPPV.  History of bronchiectasis.  Acute on chronic diastolic CHF.  Right more than left pleural effusion  CKD 3B baseline creatinine of 2.1.  Stable  S/p port placement.  Acute on chronic anemia stable.  No evidence of bleeding.  S/p 1 unit packed RBC  Diabetes mellitus.  Diabetic foot wound.  Paroxysmal A-fib on Eliquis.  Hypernatremia.  Free water deficit.  Resolved      Active Hospital Problems    Diagnosis POA   • **Acute hypoxic on chronic hypercapnic respiratory failure [J96.01, J96.12] Yes   • Pneumonia of both lungs due to Pseudomonas species [J15.1] Yes      Resolved Hospital Problems   No resolved problems to display.       Hospital Course     Hospital Course:  Direct transfer from an outside facility.  58-year-old male hospitalized on 2024 with increased lethargy, elevated blood sugars, shortness of breath with underlying history of chronic hypoxemic and hypercapnic respiratory failure with NIPPV device, MILADY, seizure disorder, CKD stage IIIb, recent CABG, BPH with history of self catheterizations, diabetic foot ulcers, recent hospitalization for Pseudomonas pneumonia, placed  in the critical care unit here, status post thoracentesis, mentation improved, Pseudomonas on sputum culture, not sensitive to Levaquin, needing to be treated with cefepime.  Did require blood transfusions for hemoglobin stabilization.  IV antibiotics arranged for discharge planning for home.  Tolerating NIPPV.  Continued on diuretics.  Pulmonary consulted.  Issues with Port-A-Cath, excessive bleeding.  Port-A-Cath reaccessed.  Respiratory culture grew group A strep and Pseudomonas, discharged on MOIZ nebs and Zosyn IV to complete several days of IV antibiotics, patient discharged at patient's request on 5/3/2024, in hemodynamic stable condition to follow-up with pulmonary as outpatient.      Day of Discharge     Vital Signs:  Temp:  [97.3 °F (36.3 °C)-98.9 °F (37.2 °C)] 97.7 °F (36.5 °C)  Heart Rate:  [73-86] 73  Resp:  [16-20] 18  BP: (137-176)/(42-68) 137/42  Flow (L/min):  [1-2] 2  Review of systems:  All systems reviewed and negative except for weakness, fatigue, cough    Physical Exam                         Constitutional: Awake, alert, no acute distress              Eyes: Pupils equal, sclerae anicteric, no conjunctival injection              HENT: NCAT, mucous membranes moist              Neck: Supple, full range of motion              Respiratory: Rhonchi and decreased to auscultation bilaterally, nonlabored respirations               Cardiovascular: RRR, no murmurs, rubs, or gallops, palpable pedal pulses bilaterally              Gastrointestinal: Positive bowel sounds, soft, nontender, nondistended              Musculoskeletal: +1 bilateral ankle edema, no clubbing or cyanosis to extremities              Psychiatric: Appropriate affect, cooperative              Neurologic: Oriented x 3, strength symmetric in all extremities, Cranial Nerves grossly intact to confrontation, speech clear              Skin: No rashes.  Left foot wound dressed      Discharge Details        Discharge Medications        New  Medications        Instructions Start Date   piperacillin-tazobactam  Commonly known as: ZOSYN   4.5 g, Intravenous, Every 8 Hours      tamsulosin 0.4 MG capsule 24 hr capsule  Commonly known as: FLOMAX   0.4 mg, Oral, Daily   Start Date: May 4, 2024     tobramycin  MG/5ML nebulizer solution  Commonly known as: MOIZ   300 mg, Nebulization, Every 12 Hours - RT             Changes to Medications        Instructions Start Date   finasteride 5 MG tablet  Commonly known as: PROSCAR  What changed: when to take this   5 mg, Oral, Daily      folic acid 1 MG tablet  Commonly known as: FOLVITE  What changed: when to take this   1 mg, Oral, Daily             Continue These Medications        Instructions Start Date   apixaban 5 MG tablet tablet  Commonly known as: Eliquis   5 mg, Oral, Every 12 Hours      aspirin 81 MG EC tablet   81 mg, Oral, Daily      atorvastatin 20 MG tablet  Commonly known as: LIPITOR   20 mg, Oral, Nightly      Breztri Aerosphere 160-9-4.8 MCG/ACT aerosol inhaler  Generic drug: Budeson-Glycopyrrol-Formoterol   2 puffs, Inhalation, 2 Times Daily, Rinse mouth out after each use      bumetanide 2 MG tablet  Commonly known as: BUMEX   Take 1 tablet by mouth 2 (Two) Times a Day.      busPIRone 15 MG tablet  Commonly known as: BUSPAR   15 mg, Oral, 2 Times Daily PRN      CALCIUM CITRATE PO   1 tablet, Oral, Nightly      carvedilol 25 MG tablet  Commonly known as: COREG   25 mg, Oral, Every 12 Hours Scheduled      docusate sodium 100 MG capsule  Commonly known as: COLACE   100 mg, Oral, 2 Times Daily PRN      DULoxetine 30 MG capsule  Commonly known as: CYMBALTA   30 mg, Oral, Daily      famotidine 40 MG tablet  Commonly known as: PEPCID   40 mg, Oral, Daily      Farxiga 5 MG tablet tablet  Generic drug: dapagliflozin   5 mg, Oral, Daily      ferrous gluconate 324 MG tablet  Commonly known as: FERGON   324 mg, Oral, Daily With Breakfast      Insulin Lispro 100 UNIT/ML injection  Commonly known as:  humaLOG   8 Units, Subcutaneous, 3 Times Daily Before Meals, Per sliding scale      ipratropium-albuterol 0.5-2.5 mg/3 ml nebulizer  Commonly known as: DUO-NEB   3 mL, Nebulization, Every 4 Hours PRN      Levemir 100 UNIT/ML injection  Generic drug: insulin detemir   10 Units, Subcutaneous, Nightly      levETIRAcetam 500 MG tablet  Commonly known as: KEPPRA   500 mg, Oral, 2 Times Daily      magnesium oxide 400 tablet tablet  Commonly known as: MAG-OX   400 mg, Oral, Nightly      montelukast 10 MG tablet  Commonly known as: SINGULAIR   10 mg, Oral, Nightly      multivitamin with iron tablet tablet   1 tablet, Oral, Daily      NIFEdipine XL 30 MG 24 hr tablet  Commonly known as: PROCARDIA XL   30 mg, Oral, Every Morning      O2  Commonly known as: OXYGEN   2 Liter O2 - CONTINUOUS (route: Oxygen)      traZODone 50 MG tablet  Commonly known as: DESYREL   50 mg, Oral, Nightly      Ventolin  (90 Base) MCG/ACT inhaler  Generic drug: albuterol sulfate HFA   2 puffs, Inhalation, Every 4 Hours PRN      vitamin C 500 MG tablet  Commonly known as: ASCORBIC ACID   500 mg, Oral, 2 Times Daily      Vitamin D3 50 MCG (2000 UT) tablet   50 mcg, Oral, Daily             Stop These Medications      levoFLOXacin 750 MG tablet  Commonly known as: Levaquin              Allergies   Allergen Reactions   • Benadryl [Diphenhydramine] Itching   • Proventil [Albuterol] Other (See Comments)     Mouth sores         Discharge Disposition:  [place] [facility] [place]    Diet:  Hospital:  Diet Order   Procedures   • Diet: Cardiac, Diabetic; Low Sodium (2g); Consistent Carbohydrate; Fluid Consistency: Thin (IDDSI 0)       Discharge Activity: as tolerates      CODE STATUS:  Code Status and Medical Interventions:   Ordered at: 04/28/24 3400     Code Status (Patient has no pulse and is not breathing):    CPR (Attempt to Resuscitate)     Medical Interventions (Patient has pulse or is breathing):    Full Support         Future Appointments   Date Time  Provider Department Center   5/17/2024 10:15 AM Kelly Worthy APRN [facility] [place] PCC [facility]   5/17/2024 11:00 AM Jordon Fuentes DPM [facility] [place] POD ETWN [facility]   5/20/2024 10:30 AM Teresita White MD [facility] [place] [facility] U ETWOOD [facility]   6/28/2024 11:40 AM Neli Paredes APRN [facility] [place] [facility] DIAB BT [facility]   7/11/2024 11:00 AM Kimmy Riley MD [facility] [place] [facility] PC BARDS [facility]   7/25/2024  9:45 AM Lino Ware MD [facility] [place] [facility] PASTORA ETWN [facility]   7/31/2024 10:45 AM Lino Ware MD [facility] [place] [facility] CD [facility]       Additional Instructions for the Follow-ups that You Need to Schedule       Discharge Follow-up with PCP   As directed       Currently Documented PCP:    Kimmy Riley MD    PCP Phone Number:    [phone]     Follow Up Details: 3 to 7 days                Pertinent  and/or Most Recent Results     PROCEDURES:   XR Chest 1 View    Result Date: 4/29/2024  XR CHEST 1 VW-  Date of Exam: 4/29/2024 11:03 AM  Indication: s/p right Thoracentesis  Comparison: 4/28/2024  Findings: No pneumothorax status post right thoracentesis. Interval decrease in size of right pleural effusion. No significant change in multifocal bilateral airspace disease. Heart size grossly stable      Impression:  1. No pneumothorax status post right thoracentesis 2. Grossly stable multifocal bilateral airspace disease   Electronically Signed By-Derrick Cortez On:4/29/2024 11:17 AM      XR Chest 1 View    Result Date: 4/29/2024  AP PORTABLE CHEST  HISTORY: Short of air. Pneumonia.  COMPARISON: 4/16/2024  TECHNIQUE: AP portable chest x-ray.  FINDINGS: Port-A-Cath tubing is unchanged. Heart size is stable. There are some persistent infiltrates in the left lung, predominantly in the upper lobe. The upper lobe infiltrates are similar to prior. The lower lobe opacities are improved. There is worsening of a right pleural effusion and right lung consolidation. No pneumothorax.       1. Worsening right pleural effusion and right lung consolidation. 2. Stable left upper lobe infiltrates with  improved left basilar infiltrates  Electronically Signed By-Dr. Edison Guthrie MD On:4/29/2024 12:13 AM      Doppler Arterial Multi Level Lower Extremity - Bilateral CAR    Result Date: 4/17/2024  •  Right Conclusion: The right KEYLA is unable to be assessed due to vessel incompressibility. Normal digital pressures. •  Left Conclusion: The left KEYLA is unable to be assessed due to vessel incompressibility. Unable to assess digital ischemia. •  Nonocclusive, poorly compressible tibial level disease bilaterally.  Normal waveforms are present through the ankle levels bilaterally.     XR Chest 1 View    Result Date: 4/16/2024  XR CHEST 1 VW-  Date of Exam: 4/16/2024 2:24 PM  Indication: s/p right Thoracentesis; J96.02-Acute respiratory failure with hypercapnia; Z78.9-Other specified health status  Comparison: 4/14/2024  Findings: Patient rotated to the right. Right subclavian port and left subclavian central venous line noted. No pneumothorax status post right thoracentesis. No significant change in multifocal airspace disease      Impression:  1. No pneumothorax status post thoracentesis 2. Stable multifocal airspace disease   Electronically Signed By-Derrick Cortez On:4/16/2024 3:30 PM      CT Chest Without Contrast Diagnostic    Result Date: 4/15/2024  CT CHEST WO CONTRAST DIAGNOSTIC-  Date of Exam: 4/15/2024 4:57 PM  Indication: soa, possible pna, plueral effusion eval,  pmhx copd/chf/morb obesity/laura; J96.02-Acute respiratory failure with hypercapnia.  Comparison: Chest x-ray 4/14/2024, CT chest 12/9/2023  Technique: Axial CT images were obtained of the chest without contrast administration.  Reconstructed coronal and sagittal images were also obtained. Automated exposure control and iterative construction methods were used.   Findings:  Hilum and Mediastinum: Moderate coronary artery atherosclerotic disease.  No pericardial effusion.  Unremarkable thoracic aorta and pulmonary arteries. There is a right-sided port.  The tip terminates at the superior vena cava. There is a residual port in the left soft tissues and internal jugular terminating at the cavoatrial junction. Cardiomegaly. Precarinal lymph node is enlarged. On image 121 it measures 1.3 cm in short axis. This is similar to previous exam.  Lung Parenchyma and Pleura: There are moderate bilateral pleural effusions. These are new. There is bronchiectasis which is varicose. There is bibasilar airspace opacity right greater than left pneumonia favored over atelectasis. There is scattered geographic groundglass opacity. There is new consolidation in the lateral right upper lobe. In the lingula there is a noncalcified nodule on image #217. It measures 9 mm. There is surrounding groundglass opacity. This is new.  Upper Abdomen: Unremarkable.  Soft tissues: Unremarkable.  Osseous structures: No aggressive focal lytic or sclerotic osseous lesions. Osteopenia.      1.  Moderate bilateral pleural effusions. Bibasilar opacity right greater than left. Pneumonia most likely. 2.  Noncalcified nodule in the lingula measuring 9 mm. Scattered areas of groundglass with mild septal thickening likely due to superimposed pulmonary edema. 3.  Severe varicose and cystic bronchiectasis. 4.  New opacity in the lateral right upper lobe atelectasis versus pneumonia. 5.  A follow-up CT chest in 3 months time is recommended to evaluate for the resolution of the noncalcified nodules and parenchymal opacities.  Electronically Signed By-Francine Garner MD On:4/15/2024 5:19 PM      XR Chest 1 View    Result Date: 4/14/2024  AP PORTABLE CHEST  HISTORY: Shortness of air.  COMPARISON: 2/21/2024  TECHNIQUE: AP portable chest x-ray.  FINDINGS: Port-A-Cath tip in the SVC. Central venous catheter tubing on the left side also has its tip in the SVC, unchanged. Cardiac and mediastinal contours are stable. There is elevation of the right hemidiaphragm. There are fairly diffuse chronic bilateral infiltrates. The  background interstitium appears increased currently which may reflect pulmonary edema. Small amount of right pleural fluid is not excluded. No pneumothorax.      Chronic changes in both lungs are similar to prior. Increased interstitial opacities bilaterally may reflect superimposed pulmonary edema. A small amount of right pleural fluid may also be present.  Electronically Signed By-Dr. Edison Guthrie MD On:4/14/2024 7:37 PM      XR CHEST 1 VW    Result Date: 4/13/2024  PORTABLE CHEST     4/12/2024 11:58 PM   HISTORY: Dyspnea.. COMPARISON: March 6, 2024... FINDINGS: The heart is stable in size.  The lung fields demonstrate no significant change. There is persistent marked cardiomegaly, bilateral pleural effusions, and mixed interstitial and alveolar opacities, there are most prominent in the mid and lower lung zones. There is no pneumothorax. The support devices are in good position. No acute osseous changes. Old fracture deformity of the right humerus is redemonstrated.    There has been no significant interval change.       LAB RESULTS:      Lab 05/03/24 0437 05/01/24 0008 04/30/24  0743 04/29/24  0140 04/29/24  0016   WBC 11.17* 12.33* 9.41  --  15.37*   HEMOGLOBIN 8.2* 7.9* 6.8*  --  7.8*   HEMATOCRIT 27.1* 25.9* 22.6*  --  26.6*   PLATELETS 296 255 217  --  293   NEUTROS ABS 7.99* 9.15*  --   --  11.14*   IMMATURE GRANS (ABS) 0.04 0.09*  --   --  0.39*   LYMPHS ABS 1.55 1.18  --   --  1.58   MONOS ABS 1.03* 1.52*  --   --  1.84*   EOS ABS 0.49* 0.35  --   --  0.36   MCV 91.2 90.6 91.1  --  93.0   PROCALCITONIN  --   --   --   --  0.22   LACTATE  --   --   --   --  0.4*   LACTATE, ARTERIAL  --   --   --  0.69  --          Lab 05/03/24  0437 05/02/24  0751 05/01/24  0008 04/30/24  0743 04/29/24  0603 04/29/24  0140   SODIUM 140 143 141 151* 138  --    SODIUM, ARTERIAL  --   --   --   --   --  133.8*   POTASSIUM 3.5 3.8 3.7 3.1* 4.7  --    CHLORIDE 95* 98 96* 100 94*  --    CO2 36.7* 37.9* 37.4* 30.1* 37.1*  --     ANION GAP 8.3 7.1 7.6 20.9* 6.9  --    BUN 29* 33* 39* 38* 46*  --    CREATININE 2.21* 1.87* 2.07* 1.90* 2.45*  --    EGFR 33.7* 41.2* 36.4* 40.4* 29.8*  --    GLUCOSE 123* 99 159* 97 157*  --    GLUCOSE, ARTERIAL  --   --   --   --   --  128*   CALCIUM 9.0 8.9 8.9 6.8* 8.6  --    IONIZED CALCIUM  --   --   --   --   --  1.14   MAGNESIUM 1.7  --   --  1.6  --   --    PHOSPHORUS 4.2 3.6 3.6 2.5  --   --          Lab 05/03/24  0437 05/02/24  0751 05/01/24  0008   TOTAL PROTEIN 6.5  --   --    ALBUMIN 2.7* 2.8* 2.8*   ALT (SGPT) 19  --   --    AST (SGOT) 22  --   --    BILIRUBIN 0.2  --   --    BILIRUBIN DIRECT <0.2  --   --    ALK PHOS 113  --   --          Lab 04/29/24  0603   PROBNP 1,366.0*             Lab 05/01/24  0008 04/30/24  1700   IRON 46*  --    IRON SATURATION (TSAT) 23  --    TIBC 203*  --    TRANSFERRIN 136*  --    ABO TYPING  --  O   RH TYPING  --  Negative   ANTIBODY SCREEN  --  Negative         Lab 04/29/24  0140   PH, ARTERIAL 7.249*   PCO2, ARTERIAL 77.5*   PO2 ART 70.7*   O2 SATURATION ART 92.1*   FIO2 40   HCO3 ART 33.1*   BASE EXCESS ART 4.6*   CARBOXYHEMOGLOBIN 0.6     Brief Urine Lab Results  (Last result in the past 365 days)        Color   Clarity   Blood   Leuk Est   Nitrite   Protein   CREAT   Urine HCG        02/29/24 1939             21.6               Microbiology Results (last 10 days)       Procedure Component Value - Date/Time    Anaerobic Culture - Pleural Fluid, Pleural Cavity [[id number]]  (Normal) Collected: 04/29/24 1003    Lab Status: Preliminary result Specimen: Pleural Fluid from Pleural Cavity Updated: 05/02/24 0941     Anaerobic Culture No anaerobes isolated at 3 days    AFB Culture - Body Fluid, Pleural Cavity [[id number]] Collected: 04/29/24 1000    Lab Status: Preliminary result Specimen: Body Fluid from Pleural Cavity Updated: 04/29/24 1353     AFB Stain No acid fast bacilli seen    Body Fluid Culture - Body Fluid, Pleural Cavity [[id number]] Collected: 04/29/24 1000     Lab Status: Final result Specimen: Body Fluid from Pleural Cavity Updated: 05/02/24 0840     Body Fluid Culture No growth at 3 days     Gram Stain Few (2+) WBCs seen      No organisms seen    MRSA Screen, PCR (Inpatient) - Swab, Nares [[id number]]  (Normal) Collected: 04/29/24 0337    Lab Status: Final result Specimen: Swab from Nares Updated: 04/29/24 0459     MRSA PCR No MRSA Detected    Narrative:      The negative predictive value of this diagnostic test is high and should only be used to consider de-escalating anti-MRSA therapy. A positive result may indicate colonization with MRSA and must be correlated clinically.    Respiratory Culture - Sputum, Throat [[id number]]  (Abnormal) Collected: 04/29/24 0052    Lab Status: Preliminary result Specimen: Sputum from Throat Updated: 05/02/24 1027     Respiratory Culture Light growth (2+) Pseudomonas aeruginosa     Comment: Sending to [facility] for MICs             Moderate growth (3+) Streptococcus pyogenes (Group A)     Comment:   This organism is considered to be universally susceptible to penicillin.  No further antibiotic testing will be performed. If Clindamycin or Erythromycin is the drug of choice, notify the laboratory within 7 days to request susceptibility testing.         Moderate growth (3+) Normal Respiratory Nola     Gram Stain Moderate (3+) WBCs seen      Occasional Epithelial cells seen      Few (2+) Gram negative bacilli      Occasional Gram positive cocci    Blood Culture - Blood, Arm, Left [[id number]]  (Normal) Collected: 04/29/24 0041    Lab Status: Preliminary result Specimen: Blood from Arm, Left Updated: 05/03/24 0045     Blood Culture No growth at 4 days    Blood Culture - Blood, Blood, Port [[id number]]  (Normal) Collected: 04/29/24 0034    Lab Status: Preliminary result Specimen: Blood, Port Updated: 05/03/24 0045     Blood Culture No growth at 4 days            XR Chest 1 View    Result Date: 4/29/2024  Impression: Impression:  1. No pneumothorax  status post right thoracentesis 2. Grossly stable multifocal bilateral airspace disease   Electronically Signed By-Derrick Cortez On:4/29/2024 11:17 AM      XR Chest 1 View    Result Date: 4/29/2024  Impression:  1. Worsening right pleural effusion and right lung consolidation. 2. Stable left upper lobe infiltrates with improved left basilar infiltrates  Electronically Signed By-Dr. Edison Guthrie MD On:4/29/2024 12:13 AM      XR Chest 1 View    Result Date: 4/16/2024  Impression: Impression:  1. No pneumothorax status post thoracentesis 2. Stable multifocal airspace disease   Electronically Signed By-Derrick Cortez On:4/16/2024 3:30 PM      CT Chest Without Contrast Diagnostic    Result Date: 4/15/2024  Impression: 1.  Moderate bilateral pleural effusions. Bibasilar opacity right greater than left. Pneumonia most likely. 2.  Noncalcified nodule in the lingula measuring 9 mm. Scattered areas of groundglass with mild septal thickening likely due to superimposed pulmonary edema. 3.  Severe varicose and cystic bronchiectasis. 4.  New opacity in the lateral right upper lobe atelectasis versus pneumonia. 5.  A follow-up CT chest in 3 months time is recommended to evaluate for the resolution of the noncalcified nodules and parenchymal opacities.  Electronically Signed By-Francine Garner MD On:4/15/2024 5:19 PM      XR Chest 1 View    Result Date: 4/14/2024  Impression: Chronic changes in both lungs are similar to prior. Increased interstitial opacities bilaterally may reflect superimposed pulmonary edema. A small amount of right pleural fluid may also be present.  Electronically Signed By-Dr. Edison Guthrie MD On:4/14/2024 7:37 PM      XR CHEST 1 VW    Result Date: 4/13/2024  Impression: There has been no significant interval change.     Results for orders placed during the hospital encounter of 04/14/24    Doppler Arterial Multi Level Lower Extremity - Bilateral CAR    Interpretation Summary  •  Right Conclusion: The right  KEYLA is unable to be assessed due to vessel incompressibility. Normal digital pressures.  •  Left Conclusion: The left KEYLA is unable to be assessed due to vessel incompressibility. Unable to assess digital ischemia.  •  Nonocclusive, poorly compressible tibial level disease bilaterally.  Normal waveforms are present through the ankle levels bilaterally.      Results for orders placed during the hospital encounter of 04/14/24    Doppler Arterial Multi Level Lower Extremity - Bilateral CAR    Interpretation Summary  •  Right Conclusion: The right KEYLA is unable to be assessed due to vessel incompressibility. Normal digital pressures.  •  Left Conclusion: The left KEYLA is unable to be assessed due to vessel incompressibility. Unable to assess digital ischemia.  •  Nonocclusive, poorly compressible tibial level disease bilaterally.  Normal waveforms are present through the ankle levels bilaterally.      Results for orders placed during the hospital encounter of 02/16/24    Adult Transthoracic Echo Complete W/ Cont if Necessary Per Protocol    Interpretation Summary  •  Left ventricular systolic function is normal. Left ventricular ejection fraction appears to be 61 - 65%.  •  Left ventricular wall thickness is consistent with mild concentric hypertrophy.  •  Left ventricular diastolic function is consistent with (grade I) impaired relaxation.  •  There are no significant valvular abnormalities.      Labs Pending at Discharge:  Pending Labs       Order Current Status    Extra Tubes In process    Fungus Culture - Body Fluid, Pleural Cavity In process    AFB Culture - Body Fluid, Pleural Cavity Preliminary result    Anaerobic Culture - Pleural Fluid, Pleural Cavity Preliminary result    Blood Culture - Blood, Arm, Left Preliminary result    Blood Culture - Blood, Blood, Port Preliminary result    Respiratory Culture - Sputum, Throat Preliminary result              Time spent on Discharge including face to face service:  36  minutes    Electronically signed by Rebel Gonzalez MD, 05/03/24, 2:30 PM EDT.    Portions of this documentation were transcribed electronically from a voice recognition software.  I confirm all data accurately represents the service(s) I performed at today's visit.

## 2024-05-03 NOTE — PLAN OF CARE
Goal Outcome Evaluation:  Plan of Care Reviewed With: patient        Progress: no change  Outcome Evaluation: Admitted with Acute / chronic  resp failure  from [facility] on 4/28. Pt has had drainage and bleeding from RT subclavin mediport. serosangious drainged noted. Pt refused to take Eliquist last night, Plan is for patient to be D/C once Mediport functional for IV  antibodic therapy for  hospital required Pneumonia  , and LLE wound . Slept  most of evening.

## 2024-05-04 LAB
BACTERIA SPEC AEROBE CULT: NORMAL
BACTERIA SPEC AEROBE CULT: NORMAL
BACTERIA SPEC ANAEROBE CULT: NORMAL

## 2024-05-06 ENCOUNTER — PATIENT OUTREACH (OUTPATIENT)
Dept: CASE MANAGEMENT | Facility: OTHER | Age: 59
End: 2024-05-06
Payer: COMMERCIAL

## 2024-05-06 ENCOUNTER — TRANSITIONAL CARE MANAGEMENT TELEPHONE ENCOUNTER (OUTPATIENT)
Dept: CALL CENTER | Facility: HOSPITAL | Age: 59
End: 2024-05-06
Payer: COMMERCIAL

## 2024-05-06 DIAGNOSIS — N18.32 STAGE 3B CHRONIC KIDNEY DISEASE (CKD): ICD-10-CM

## 2024-05-06 DIAGNOSIS — K21.9 GASTROESOPHAGEAL REFLUX DISEASE WITHOUT ESOPHAGITIS: ICD-10-CM

## 2024-05-06 DIAGNOSIS — J96.01 ACUTE HYPOXIC ON CHRONIC HYPERCAPNIC RESPIRATORY FAILURE: Primary | ICD-10-CM

## 2024-05-06 DIAGNOSIS — E11.40 TYPE 2 DIABETES MELLITUS WITH DIABETIC NEUROPATHY, WITH LONG-TERM CURRENT USE OF INSULIN: ICD-10-CM

## 2024-05-06 DIAGNOSIS — N40.1 BENIGN PROSTATIC HYPERPLASIA WITH URINARY RETENTION: ICD-10-CM

## 2024-05-06 DIAGNOSIS — G47.00 INSOMNIA, UNSPECIFIED TYPE: ICD-10-CM

## 2024-05-06 DIAGNOSIS — F41.9 ANXIETY: ICD-10-CM

## 2024-05-06 DIAGNOSIS — R13.10 DYSPHAGIA, UNSPECIFIED TYPE: ICD-10-CM

## 2024-05-06 DIAGNOSIS — D50.9 IRON DEFICIENCY ANEMIA, UNSPECIFIED IRON DEFICIENCY ANEMIA TYPE: ICD-10-CM

## 2024-05-06 DIAGNOSIS — R56.9 SEIZURES: ICD-10-CM

## 2024-05-06 DIAGNOSIS — J96.12 ACUTE HYPOXIC ON CHRONIC HYPERCAPNIC RESPIRATORY FAILURE: Primary | ICD-10-CM

## 2024-05-06 DIAGNOSIS — I48.0 PAROXYSMAL ATRIAL FIBRILLATION: ICD-10-CM

## 2024-05-06 DIAGNOSIS — Z79.4 TYPE 2 DIABETES MELLITUS WITH HYPERGLYCEMIA, WITH LONG-TERM CURRENT USE OF INSULIN: ICD-10-CM

## 2024-05-06 DIAGNOSIS — E11.65 TYPE 2 DIABETES MELLITUS WITH HYPERGLYCEMIA, WITH LONG-TERM CURRENT USE OF INSULIN: ICD-10-CM

## 2024-05-06 DIAGNOSIS — I50.32 CHRONIC DIASTOLIC (CONGESTIVE) HEART FAILURE: ICD-10-CM

## 2024-05-06 DIAGNOSIS — K59.09 OTHER CONSTIPATION: ICD-10-CM

## 2024-05-06 DIAGNOSIS — J44.1 COPD EXACERBATION: ICD-10-CM

## 2024-05-06 DIAGNOSIS — E55.9 VITAMIN D DEFICIENCY: ICD-10-CM

## 2024-05-06 DIAGNOSIS — R33.8 BENIGN PROSTATIC HYPERPLASIA WITH URINARY RETENTION: ICD-10-CM

## 2024-05-06 DIAGNOSIS — G62.9 PERIPHERAL POLYNEUROPATHY: ICD-10-CM

## 2024-05-06 DIAGNOSIS — L97.521 ULCER OF LEFT FOOT, LIMITED TO BREAKDOWN OF SKIN: ICD-10-CM

## 2024-05-06 DIAGNOSIS — Z79.4 TYPE 2 DIABETES MELLITUS WITH DIABETIC NEUROPATHY, WITH LONG-TERM CURRENT USE OF INSULIN: ICD-10-CM

## 2024-05-06 DIAGNOSIS — E78.2 MIXED HYPERLIPIDEMIA: Primary | ICD-10-CM

## 2024-05-07 LAB
FUNGUS WND CULT: NORMAL
MYCOBACTERIUM SPEC CULT: NORMAL
NIGHT BLUE STAIN TISS: NORMAL

## 2024-05-07 RX ORDER — MULTIVIT/IRON SULF/FOLIC ACID 15MG-0.4MG
1 TABLET ORAL EVERY MORNING
Qty: 30 TABLET | Refills: 2 | Status: SHIPPED | OUTPATIENT
Start: 2024-05-07

## 2024-05-07 RX ORDER — CHOLECALCIFEROL (VITAMIN D3) 125 MCG
1 CAPSULE ORAL EVERY MORNING
Qty: 30 TABLET | Refills: 2 | Status: SHIPPED | OUTPATIENT
Start: 2024-05-07

## 2024-05-07 RX ORDER — FAMOTIDINE 40 MG/1
40 TABLET, FILM COATED ORAL EVERY MORNING
Qty: 30 TABLET | Refills: 2 | Status: SHIPPED | OUTPATIENT
Start: 2024-05-07

## 2024-05-07 RX ORDER — ASCORBIC ACID 500 MG
500 TABLET ORAL 2 TIMES DAILY
Qty: 60 TABLET | Refills: 2 | Status: SHIPPED | OUTPATIENT
Start: 2024-05-07

## 2024-05-07 RX ORDER — DAPAGLIFLOZIN 5 MG/1
5 TABLET, FILM COATED ORAL EVERY MORNING
Qty: 30 TABLET | Refills: 2 | Status: SHIPPED | OUTPATIENT
Start: 2024-05-07

## 2024-05-07 RX ORDER — MONTELUKAST SODIUM 10 MG/1
10 TABLET ORAL NIGHTLY
Qty: 30 TABLET | Refills: 2 | Status: SHIPPED | OUTPATIENT
Start: 2024-05-07

## 2024-05-07 RX ORDER — ASPIRIN 81 MG/1
81 TABLET ORAL EVERY MORNING
Qty: 30 TABLET | Refills: 2 | Status: SHIPPED | OUTPATIENT
Start: 2024-05-07

## 2024-05-07 RX ORDER — BUMETANIDE 2 MG/1
2 TABLET ORAL 2 TIMES DAILY
Qty: 60 TABLET | Refills: 2 | Status: SHIPPED | OUTPATIENT
Start: 2024-05-07

## 2024-05-07 RX ORDER — IBUPROFEN 200 MG
950 CAPSULE ORAL NIGHTLY
Qty: 30 TABLET | Refills: 2 | Status: SHIPPED | OUTPATIENT
Start: 2024-05-07

## 2024-05-07 RX ORDER — BUSPIRONE HYDROCHLORIDE 15 MG/1
15 TABLET ORAL 2 TIMES DAILY
Qty: 60 TABLET | Refills: 2 | Status: SHIPPED | OUTPATIENT
Start: 2024-05-07

## 2024-05-07 RX ORDER — ATORVASTATIN CALCIUM 20 MG/1
20 TABLET, FILM COATED ORAL NIGHTLY
Qty: 30 TABLET | Refills: 2 | Status: SHIPPED | OUTPATIENT
Start: 2024-05-07

## 2024-05-07 RX ORDER — FOLIC ACID 1 MG/1
1 TABLET ORAL NIGHTLY
Qty: 30 TABLET | Refills: 2 | Status: SHIPPED | OUTPATIENT
Start: 2024-05-07

## 2024-05-07 RX ORDER — DOCUSATE SODIUM 100 MG/1
100 CAPSULE, LIQUID FILLED ORAL NIGHTLY
Qty: 30 CAPSULE | Refills: 2 | Status: SHIPPED | OUTPATIENT
Start: 2024-05-07

## 2024-05-07 RX ORDER — CARVEDILOL 25 MG/1
25 TABLET ORAL EVERY 12 HOURS SCHEDULED
Qty: 60 TABLET | Refills: 2 | Status: SHIPPED | OUTPATIENT
Start: 2024-05-07

## 2024-05-07 RX ORDER — NIFEDIPINE 30 MG/1
30 TABLET, EXTENDED RELEASE ORAL EVERY MORNING
Qty: 30 TABLET | Refills: 2 | Status: SHIPPED | OUTPATIENT
Start: 2024-05-07

## 2024-05-07 RX ORDER — DULOXETIN HYDROCHLORIDE 30 MG/1
30 CAPSULE, DELAYED RELEASE ORAL EVERY MORNING
Qty: 30 CAPSULE | Refills: 2 | Status: SHIPPED | OUTPATIENT
Start: 2024-05-07

## 2024-05-07 RX ORDER — FERROUS GLUCONATE 324(38)MG
324 TABLET ORAL EVERY MORNING
Qty: 30 TABLET | Refills: 2 | Status: SHIPPED | OUTPATIENT
Start: 2024-05-07

## 2024-05-07 RX ORDER — INSULIN LISPRO 100 [IU]/ML
8 INJECTION, SOLUTION INTRAVENOUS; SUBCUTANEOUS
Qty: 15 ML | Refills: 0 | Status: SHIPPED | OUTPATIENT
Start: 2024-05-07

## 2024-05-07 RX ORDER — TRAZODONE HYDROCHLORIDE 50 MG/1
50 TABLET ORAL NIGHTLY
Qty: 30 TABLET | Refills: 2 | Status: SHIPPED | OUTPATIENT
Start: 2024-05-07

## 2024-05-07 RX ORDER — INSULIN DETEMIR 100 [IU]/ML
10 INJECTION, SOLUTION SUBCUTANEOUS NIGHTLY
Qty: 15 ML | Refills: 2 | Status: SHIPPED | OUTPATIENT
Start: 2024-05-07

## 2024-05-07 RX ORDER — FINASTERIDE 5 MG/1
5 TABLET, FILM COATED ORAL NIGHTLY
Qty: 30 TABLET | Refills: 2 | Status: SHIPPED | OUTPATIENT
Start: 2024-05-07

## 2024-05-07 RX ORDER — TAMSULOSIN HYDROCHLORIDE 0.4 MG/1
0.4 CAPSULE ORAL NIGHTLY
Qty: 30 CAPSULE | Refills: 2 | Status: SHIPPED | OUTPATIENT
Start: 2024-05-07

## 2024-05-07 RX ORDER — LEVETIRACETAM 500 MG/1
500 TABLET ORAL 2 TIMES DAILY
Qty: 60 TABLET | Refills: 2 | Status: SHIPPED | OUTPATIENT
Start: 2024-05-07

## 2024-05-09 ENCOUNTER — OUTSIDE FACILITY SERVICE (OUTPATIENT)
Dept: FAMILY MEDICINE CLINIC | Age: 59
End: 2024-05-09
Payer: COMMERCIAL

## 2024-05-09 PROCEDURE — OUTSIDEPOS PR OUTSIDE POS PLACEHOLDER: Performed by: FAMILY MEDICINE

## 2024-05-10 ENCOUNTER — TELEPHONE (OUTPATIENT)
Dept: FAMILY MEDICINE CLINIC | Age: 59
End: 2024-05-10
Payer: COMMERCIAL

## 2024-05-13 ENCOUNTER — OFFICE VISIT (OUTPATIENT)
Dept: FAMILY MEDICINE CLINIC | Age: 59
End: 2024-05-13
Payer: COMMERCIAL

## 2024-05-13 VITALS
TEMPERATURE: 97.3 F | HEIGHT: 69 IN | HEART RATE: 69 BPM | DIASTOLIC BLOOD PRESSURE: 52 MMHG | SYSTOLIC BLOOD PRESSURE: 130 MMHG | OXYGEN SATURATION: 98 % | BODY MASS INDEX: 40.66 KG/M2

## 2024-05-13 DIAGNOSIS — E11.65 TYPE 2 DIABETES MELLITUS WITH HYPERGLYCEMIA, WITH LONG-TERM CURRENT USE OF INSULIN: ICD-10-CM

## 2024-05-13 DIAGNOSIS — N18.32 STAGE 3B CHRONIC KIDNEY DISEASE (CKD): ICD-10-CM

## 2024-05-13 DIAGNOSIS — D50.8 IRON DEFICIENCY ANEMIA SECONDARY TO INADEQUATE DIETARY IRON INTAKE: ICD-10-CM

## 2024-05-13 DIAGNOSIS — Z79.4 TYPE 2 DIABETES MELLITUS WITH HYPERGLYCEMIA, WITH LONG-TERM CURRENT USE OF INSULIN: ICD-10-CM

## 2024-05-13 DIAGNOSIS — G62.9 PERIPHERAL POLYNEUROPATHY: ICD-10-CM

## 2024-05-13 DIAGNOSIS — G47.33 OBSTRUCTIVE SLEEP APNEA: ICD-10-CM

## 2024-05-13 DIAGNOSIS — I73.9 PERIPHERAL ARTERIAL DISEASE: ICD-10-CM

## 2024-05-13 DIAGNOSIS — R60.0 EDEMA OF RIGHT UPPER ARM: ICD-10-CM

## 2024-05-13 DIAGNOSIS — J18.9 PNEUMONIA OF BOTH LUNGS DUE TO INFECTIOUS ORGANISM, UNSPECIFIED PART OF LUNG: Primary | ICD-10-CM

## 2024-05-13 DIAGNOSIS — Z79.01 CHRONIC ANTICOAGULATION: ICD-10-CM

## 2024-05-13 DIAGNOSIS — J44.1 COPD EXACERBATION: ICD-10-CM

## 2024-05-13 DIAGNOSIS — F51.01 PRIMARY INSOMNIA: ICD-10-CM

## 2024-05-13 DIAGNOSIS — K21.9 GASTROESOPHAGEAL REFLUX DISEASE WITHOUT ESOPHAGITIS: ICD-10-CM

## 2024-05-13 DIAGNOSIS — I12.9 TYPE 2 DM WITH CKD STAGE 3 AND HYPERTENSION: ICD-10-CM

## 2024-05-13 DIAGNOSIS — I48.0 PAROXYSMAL ATRIAL FIBRILLATION: ICD-10-CM

## 2024-05-13 DIAGNOSIS — E11.22 TYPE 2 DM WITH CKD STAGE 3 AND HYPERTENSION: ICD-10-CM

## 2024-05-13 DIAGNOSIS — L21.9 SEBORRHEIC DERMATITIS OF SCALP: ICD-10-CM

## 2024-05-13 DIAGNOSIS — N18.30 TYPE 2 DM WITH CKD STAGE 3 AND HYPERTENSION: ICD-10-CM

## 2024-05-13 DIAGNOSIS — R60.0 LOWER EXTREMITY EDEMA: Chronic | ICD-10-CM

## 2024-05-13 LAB
BACTERIA SPEC RESP CULT: ABNORMAL
EXPIRATION DATE: ABNORMAL
GRAM STN SPEC: ABNORMAL
HBA1C MFR BLD: 7.3 % (ref 4.5–5.7)
Lab: ABNORMAL

## 2024-05-13 PROCEDURE — 1125F AMNT PAIN NOTED PAIN PRSNT: CPT | Performed by: FAMILY MEDICINE

## 2024-05-13 PROCEDURE — 3075F SYST BP GE 130 - 139MM HG: CPT | Performed by: FAMILY MEDICINE

## 2024-05-13 PROCEDURE — 3051F HG A1C>EQUAL 7.0%<8.0%: CPT | Performed by: FAMILY MEDICINE

## 2024-05-13 PROCEDURE — 99214 OFFICE O/P EST MOD 30 MIN: CPT | Performed by: FAMILY MEDICINE

## 2024-05-13 PROCEDURE — 3078F DIAST BP <80 MM HG: CPT | Performed by: FAMILY MEDICINE

## 2024-05-13 PROCEDURE — 83036 HEMOGLOBIN GLYCOSYLATED A1C: CPT | Performed by: FAMILY MEDICINE

## 2024-05-13 NOTE — PROGRESS NOTES
Preston Wallis presents to [facility].    Chief Complaint: Transition of care for hospital admit for pneumonia    Subjective     History of Present Illness:  HPI  Date of Admission: 4/28/2024  Date of Discharge:  5/3/2024    Hospital Course:  I am seeing Gómez today to follow-up for recent hospitalization from 4/28/2024 to 5/3/2024.  He presented to the hospital with increased lethargy, elevated blood sugars, shortness of breath with underlying history of chronic hypoxemic and hypercapnic respiratory failure with NIPPV device, MILADY, seizure disorder, CKD stage IIIb, recent CABG, BPH with history of self catheterizations, diabetic foot ulcers.  He had recently been in the hospital for and treated for Pseudomonas pneumonia.  He was readmitted for presumed Pseudomonas pneumonia along with pleural effusion.  He is s/p thoracentesis and treated with IV Zosyn and cefepime.  You had been sent home previously on Levaquin and the sputum culture showed that Pseudomonas was not sensitive to Levaquin.  He did require blood transfusions for hemoglobin stabilization.  His lower extremity edema remains stable on Bumex diuretics.  He has a Port-A-Cath that will require monthly flushing with home health.  He was discharged on MOIZ nebs and Zosyn IV.  Today he states he feels good.  He is breathing well and has no acute distress.  He has 2 diabetic foot ulcers which do not appear infected but are very slow in healing.  Home health is to help with treatment of these ulcers.  His bowel movements are normal and daily.  His appetite is good and he is eating well.  Patient discharged home on finasteride, folic acid, Eliquis, aspirin, atorvastatin, Breztri, Bumex, buspirone, calcium, carvedilol,'s, Cymbalta 30 mg, Pepcid, Farxiga, iron, insulin lispro, DuoNebs, Levemir 10 units nightly, Keppra 500 mg twice daily, magnesium, montelukast, multivitamin, nifedipine XL 30 mg, trazodone 50 mg, Ventolin HFA, vitamin C,  vitamin D.    In addition he complains of  having more hand swelling in his right hand, no pain in the hand and no issues with circulation     He has diabetes and his blood sugars have been stable and well-controlled with by durian, Farxiga long-acting Levemir 10 units daily and short acting lispro.  He tolerates meds well    He has hypercholesterolemia and is currently stable on atorvastatin and tolerates meds well.    He has chronic anxiety with depression and he is stable and doing well with Cymbalta and BuSpar.  He is sleeping well at night with trazodone.    His blood pressures are under good control with Coreg, nifedipine and he tolerates meds well    He has an underlying seizure disorder and has been seizure-free on Keppra 500 mg twice daily.  He is to follow-up with neurology as directed    His BPH is stable on Proscar.      MRSA nares swab was negative for MRSA, respiratory culture grew out Pseudomonas aeruginosa and Streptococcus pyogenes, blood culture was negative    Doppler Arterial Multi Level Lower Extremity - Bilateral CAR     Interpretation Summary    Right Conclusion: The right KEYLA is unable to be assessed due to vessel incompressibility. Normal digital pressures.    Left Conclusion: The left KEYLA is unable to be assessed due to vessel incompressibility. Unable to assess digital ischemia.    Nonocclusive, poorly compressible tibial level disease bilaterally.  Normal waveforms are present through the ankle levels bilaterally.   Results for orders placed during the hospital encounter of 04/14/24       Adult Transthoracic Echo Complete W/ Cont if Necessary Per Protocol 2/16/24     Left ventricular systolic function is normal. Left ventricular ejection fraction appears to be 61 - 65%.    Left ventricular wall thickness is consistent with mild concentric hypertrophy.    Left ventricular diastolic function is consistent with (grade I) impaired relaxation.    There are no significant valvular  abnormalities.      PROCEDURES:   XR Chest 1 View  Result Date: 4/29/2024  XR CHEST 1 VW-  Date of Exam: 4/29/2024 11:03 AM  Indication: s/p right Thoracentesis  Comparison: 4/28/2024  Findings: No pneumothorax status post right thoracentesis. Interval decrease in size of right pleural effusion. No significant change in multifocal bilateral airspace disease. Heart size grossly stable    Impression:  1. No pneumothorax status post right thoracentesis 2. Grossly stable multifocal bilateral airspace disease   Electronically Signed By-Derrick Cortez On:4/29/2024 11:17 AM       XR Chest 1 View  Result Date: 4/29/2024  AP PORTABLE CHEST  HISTORY: Short of air. Pneumonia.  COMPARISON: 4/16/2024  TECHNIQUE: AP portable chest x-ray.  FINDINGS: Port-A-Cath tubing is unchanged. Heart size is stable. There are some persistent infiltrates in the left lung, predominantly in the upper lobe. The upper lobe infiltrates are similar to prior. The lower lobe opacities are improved. There is worsening of a right pleural effusion and right lung consolidation. No pneumothorax.    1. Worsening right pleural effusion and right lung consolidation. 2. Stable left upper lobe infiltrates with improved left basilar infiltrates  Electronically Signed By-Dr. Edison Guthrie MD On:4/29/2024 12:13 AM       CT Chest Without Contrast Diagnostic  Result Date: 4/15/2024  CT CHEST WO CONTRAST DIAGNOSTIC-  Date of Exam: 4/15/2024 4:57 PM  Indication: soa, possible pna, plueral effusion eval,  pmhx copd/chf/morb obesity/laura; J96.02-Acute respiratory failure with hypercapnia.  Comparison: Chest x-ray 4/14/2024, CT chest 12/9/2023  Technique: Axial CT images were obtained of the chest without contrast administration.  Reconstructed coronal and sagittal images were also obtained. Automated exposure control and iterative construction methods were used.   Findings:  Hilum and Mediastinum: Moderate coronary artery atherosclerotic disease.  No pericardial  effusion.  Unremarkable thoracic aorta and pulmonary arteries. There is a right-sided port. The tip terminates at the superior vena cava. There is a residual port in the left soft tissues and internal jugular terminating at the cavoatrial junction. Cardiomegaly. Precarinal lymph node is enlarged. On image 121 it measures 1.3 cm in short axis. This is similar to previous exam.  Lung Parenchyma and Pleura: There are moderate bilateral pleural effusions. These are new. There is bronchiectasis which is varicose. There is bibasilar airspace opacity right greater than left pneumonia favored over atelectasis. There is scattered geographic groundglass opacity. There is new consolidation in the lateral right upper lobe. In the lingula there is a noncalcified nodule on image #217. It measures 9 mm. There is surrounding groundglass opacity. This is new.  Upper Abdomen: Unremarkable.  Soft tissues: Unremarkable.  Osseous structures: No aggressive focal lytic or sclerotic osseous lesions. Osteopenia.     1.  Moderate bilateral pleural effusions. Bibasilar opacity right greater than left. Pneumonia most likely. 2.  Noncalcified nodule in the lingula measuring 9 mm. Scattered areas of groundglass with mild septal thickening likely due to superimposed pulmonary edema. 3.  Severe varicose and cystic bronchiectasis. 4.  New opacity in the lateral right upper lobe atelectasis versus pneumonia. 5.  A follow-up CT chest in 3 months time is recommended to evaluate for the resolution of the noncalcified nodules and parenchymal opacities.  Electronically Signed By-Francine Garner MD On:4/15/2024 5:19 PM         Result Review   The following data was reviewed by Kimmy Riley MD on 05/13/2024.  Lab Results   Component Value Date    WBC 11.17 (H) 05/03/2024    HGB 8.2 (L) 05/03/2024    HCT 27.1 (L) 05/03/2024    MCV 91.2 05/03/2024     05/03/2024     Lab Results   Component Value Date    GLUCOSE 123 (H) 05/03/2024    BUN 29 (H)  05/03/2024    CREATININE 2.21 (H) 05/03/2024    EGFR 33.7 (L) 05/03/2024    BCR 13.1 05/03/2024    K 3.5 05/03/2024    CO2 36.7 (H) 05/03/2024    CALCIUM 9.0 05/03/2024    PROTENTOTREF 7.1 08/22/2022    ALBUMIN 2.7 (L) 05/03/2024    BILITOT 0.2 05/03/2024    AST 22 05/03/2024    ALT 19 05/03/2024     Lab Results   Component Value Date    CHOL 130 09/07/2023    CHLPL 165 08/26/2020    TRIG 205 (H) 09/07/2023    HDL 48 09/07/2023    LDL 49 09/07/2023     Lab Results   Component Value Date    TSH 1.497 04/13/2024     Lab Results   Component Value Date    HGBA1C 7.3 (A) 05/13/2024     Lab Results   Component Value Date    PSA 0.203 05/25/2023    PSA 0.725 03/17/2022     Lab Results   Component Value Date    IRON 46 (L) 05/01/2024      Lab Results   Component Value Date    CROG82TH 25.5 (L) 05/25/2023               Assessment and Plan:   Diagnoses and all orders for this visit:    1. Pneumonia of both lungs due to infectious organism, unspecified part of lung (Primary)  Comments:  He has completed antibiotic and is breathing well.  Continue pulmonary toilet with his inhalers and nebulizers, F/u pulmonology as directed    2. COPD exacerbation  Comments:  He has completed antibiotic and is breathing well. Continue pulmonary toilet with his inhalers and nebulizers, F/u pulmonology as directed    3. Lower extremity edema  Comments:  Okay to wrap LEH with Ace bandages, he is to make sure he does not wrap too tightly that his toes go numb or feel cold    4. Paroxysmal atrial fibrillation  Comments:  Stable on current treatment plan.  Continue Eliquis and Coreg.  F/u cardiology as directed    5. Obstructive sleep apnea  Comments:  Recommend he uses CPAP as directed    6. Peripheral arterial disease  Comments:  He has left PAD.  Okay to perform leg wrappings and continue Bumex for LE edema    7. Primary insomnia  Comments:  Continue trazodone 50 mg nightly to help with sleep    8. Edema of right lower arm  Comments:  Recommend  he use an Ace bandage or a tight glove for edema and keep hand above heart to help with mild edema    9. Peripheral polyneuropathy    10. Type 2 diabetes mellitus with hyperglycemia, with long-term current use of insulin  Comments:  Hemoglobin A1c today was excellent at 7.3%.  He is to follow-up with endocrinology as directed and continue current Tx plan  Orders:  -     POC Glycosylated Hemoglobin (Hb A1C)    11. Stage 3b chronic kidney disease (CKD)    12. Gastroesophageal reflux disease without esophagitis    13. Iron deficiency anemia secondary to inadequate dietary iron intake    14. Type 2 DM with CKD stage 3 and hypertension    15. Chronic anticoagulation    16. Seborrheic dermatitis of scalp  -     Ketoconazole 1 % shampoo; Apply 10 mL topically Every 3 (Three) Days.  Dispense: 200 mL; Refill: 0              Objective     Medications:  Current Outpatient Medications   Medication Instructions    apixaban (ELIQUIS) 5 mg, Oral, Every 12 Hours    aspirin 81 mg, Oral, Every Morning    atorvastatin (LIPITOR) 20 mg, Oral, Nightly    bumetanide (BUMEX) 2 mg, Oral, 2 Times Daily    busPIRone (BUSPAR) 15 mg, Oral, 2 Times Daily    calcium citrate (CALCITRATE) 950 mg, Oral, Nightly    carvedilol (COREG) 25 mg, Oral, Every 12 Hours Scheduled    dapagliflozin (FARXIGA) 5 mg, Oral, Every Morning    docusate sodium (COLACE) 100 mg, Oral, Nightly    DULoxetine (CYMBALTA) 30 mg, Oral, Every Morning    exenatide er (BYDUREON) 2 mg, Subcutaneous, Weekly    famotidine (PEPCID) 40 mg, Oral, Every Morning    ferrous gluconate (FERGON) 324 mg, Oral, Every Morning    finasteride (PROSCAR) 5 mg, Oral, Nightly    folic acid (FOLVITE) 1 mg, Oral, Nightly    Insulin Lispro (HUMALOG) 8 Units, Subcutaneous, 3 Times Daily Before Meals, Per sliding scale    ipratropium-albuterol (DUO-NEB) 0.5-2.5 mg/3 ml nebulizer 3 mL, Nebulization, Every 4 Hours PRN    Ketoconazole 1 % shampoo 10 mL, Apply externally, Every 3 Days    Levemir 10 Units,  "Subcutaneous, Nightly    levETIRAcetam (KEPPRA) 500 mg, Oral, 2 Times Daily    magnesium oxide (MAG-OX) 400 mg, Oral, Nightly    montelukast (SINGULAIR) 10 mg, Oral, Nightly    Multiple Vitamins-Iron (multivitamin with iron) tablet tablet 1 tablet, Oral, Every Morning    NIFEdipine XL (PROCARDIA XL) 30 mg, Oral, Every Morning    O2 (OXYGEN) 2 Liter O2 - CONTINUOUS (route: Oxygen)    tamsulosin (FLOMAX) 0.4 mg, Oral, Nightly    tobramycin PF (MOIZ) 300 mg, Nebulization, Every 12 Hours - RT    traZODone (DESYREL) 50 mg, Oral, Nightly    vitamin C (ASCORBIC ACID) 500 mg, Oral, 2 Times Daily    Vitamin D3 50 mcg, Oral, Every Morning        Vital Signs:   /52 (BP Location: Left arm, Patient Position: Sitting, Cuff Size: Adult)   Pulse 69   Temp 97.3 °F (36.3 °C)   Ht 175.3 cm (69\")   SpO2 98% Comment: O2 on 2  BMI 40.66 kg/m²             Physical Exam:  Physical Exam  Vitals and nursing note reviewed.   Constitutional:       General: He is not in acute distress.     Appearance: Normal appearance. He is not ill-appearing, toxic-appearing or diaphoretic.   HENT:      Head: Normocephalic and atraumatic.      Right Ear: Tympanic membrane, ear canal and external ear normal.      Left Ear: Tympanic membrane, ear canal and external ear normal.      Nose: No congestion or rhinorrhea.      Mouth/Throat:      Mouth: Mucous membranes are moist.      Pharynx: Oropharynx is clear. No oropharyngeal exudate or posterior oropharyngeal erythema.   Eyes:      Extraocular Movements: Extraocular movements intact.      Conjunctiva/sclera: Conjunctivae normal.      Pupils: Pupils are equal, round, and reactive to light.   Cardiovascular:      Rate and Rhythm: Normal rate and regular rhythm.      Heart sounds: Normal heart sounds.   Pulmonary:      Effort: Pulmonary effort is normal.      Breath sounds: Normal breath sounds. No wheezing, rhonchi or rales.   Abdominal:      General: Abdomen is flat.      Palpations: Abdomen is soft. " "There is no mass.      Tenderness: There is no abdominal tenderness.      Hernia: No hernia is present.   Musculoskeletal:      Cervical back: Neck supple. No rigidity.      Right lower leg: No edema.      Left lower leg: No edema.   Lymphadenopathy:      Cervical: No cervical adenopathy.   Skin:     General: Skin is warm and dry.          Neurological:      General: No focal deficit present.      Mental Status: He is alert and oriented to person, place, and time. Mental status is at baseline.   Psychiatric:         Mood and Affect: Mood normal.         Behavior: Behavior normal.         Thought Content: Thought content normal.         Judgment: Judgment normal.           Review of Systems:  Review of Systems   Constitutional:  Positive for fatigue. Negative for chills and fever.   HENT:  Negative for congestion, ear discharge and sore throat.    Respiratory:  Positive for cough, shortness of breath and wheezing.    Cardiovascular:  Positive for leg swelling. Negative for chest pain and palpitations.   Gastrointestinal:  Negative for abdominal pain, constipation, diarrhea, nausea, vomiting and GERD.   Genitourinary:  Negative for flank pain.   Musculoskeletal:  Positive for back pain.   Skin:  Positive for wound.   Neurological:  Negative for dizziness and headache.   Psychiatric/Behavioral:  Negative for depressed mood.               Follow Up   Return in about 6 months (around 11/13/2024) for Recheck.    Part of this note may be an electronic transcription/translation of spoken language to printed   text using the Dragon Dictation System.            Medical History:  There are no discontinued medications.   Past Medical History:    Age-related cognitive decline    Allergic contact dermatitis    Allergies    Anemia    Bedbound    11/2023 \"MY LEG MUSCLES STOPPED WORKING\"    Bronchiectasis with acute lower respiratory infection    Charcot foot due to diabetes mellitus    Chronic diastolic (congestive) heart failure "    Chronic kidney disease    Chronic respiratory failure with hypoxia    Closed supracondylar fracture of femur    COPD (chronic obstructive pulmonary disease)    Deep vein thrombosis (DVT) of lower extremity associated with air travel    Dependence on supplemental oxygen    Eczema    Erectile dysfunction    due to organic reasons    Essential (primary) hypertension    Fracture    closed fracture of other tarsal and metatarsal bones    Fracture of proximal humerus    GERD without esophagitis    High risk medication use    Hypercholesteremia    Hypomagnesemia    Infected stasis ulcer of left lower extremity    Insomnia    Low back pain    Major depressive disorder    Morbid (severe) obesity due to excess calories    MRSA pneumonia    Muscle weakness    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 without evidence of necrosis    Obstructive sleep apnea (adult) (pediatric)    On home O2    REPORTS WEARING 2L/NC AAT    Other forms of dyspnea    Other long term (current) drug therapy    Other specified noninfective gastroenteritis and colitis    Other spondylosis, lumbar region    Pain in both knees    Paroxysmal atrial fibrillation    Peripheral neuropathy    attributed to type 2 diabetes    Pneumonia, unspecified organism    Polyneuropathy    Rash and other nonspecific skin eruption    Syncope and collapse    Tachycardia    Tinnitus    Type 1 diabetes mellitus with diabetic chronic kidney disease    Type 2 diabetes mellitus    Unspecified fall, initial encounter    Urinary retention     Past Surgical History:    CHOLECYSTECTOMY    CYSTOSCOPY    FEMUR SURGERY    Shravan placed    KNEE SURGERY    OTHER SURGICAL HISTORY    venous port, REMOVED    PORTACATH PLACEMENT    TIBIAL PLATEAU OPEN REDUCTION INTERNAL FIXATION    Procedure: TIBIAL PLATEAU OPEN REDUCTION INTERNAL FIXATION;  Surgeon: Hugo Kline MD;  Location: [facility];  Service: Orthopedics;  Laterality: Left;     TONSILLECTOMY AND ADENOIDECTOMY      Family History   Problem Relation Age of Onset    Coronary artery disease Mother     Hypertension Mother     Diabetes type II Mother     Asthma Father     Diabetes type II Sister     Cancer Sister      Social History     Tobacco Use    Smoking status: Former     Current packs/day: 0.00     Average packs/day: 1 pack/day for 12.0 years (12.0 ttl pk-yrs)     Types: Cigarettes     Start date:      Quit date:      Years since quittin.3     Passive exposure: Past    Smokeless tobacco: Never   Substance Use Topics    Alcohol use: Not Currently       Health Maintenance Due   Topic Date Due    ZOSTER VACCINE (1 of 2) Never done    COVID-19 Vaccine ( season) Never done    ANNUAL PHYSICAL  2024    DIABETIC FOOT EXAM  2024        Immunization History   Administered Date(s) Administered    Flu Vaccine Quad PF >36MO 10/18/2016, 10/16/2017, 2019    Flu Vaccine Split Quad 2019    Fluzone (or Fluarix & Flulaval for VFC) >6mos 10/18/2016, 10/16/2017, 2019, 10/19/2022, 2023    Influenza Injectable Mdck Pf Quad 10/19/2022    Influenza Quad Vaccine (Inpatient) 2013    Influenza, Unspecified 2020    PEDS-Pneumococcal Conjugate (PCV7) 2023    Pneumococcal Conjugate 20-Valent (PCV20) 2023    Pneumococcal Polysaccharide (PPSV23) 1997    Tdap 2018    influenza Split 2019       Allergies   Allergen Reactions    Benadryl [Diphenhydramine] Itching    Proventil [Albuterol] Other (See Comments)     Mouth sores

## 2024-05-13 NOTE — Clinical Note
It is okay for him to use Ace bandages to wrap his feet as long as he does not feel numbness or his toes do not feel cold with the Ace bandage tightness.  In addition home health nurse has not permission to do his port flushes monthly.  Dr. Riley

## 2024-05-14 LAB
FUNGUS WND CULT: NORMAL
MYCOBACTERIUM SPEC CULT: NORMAL
NIGHT BLUE STAIN TISS: NORMAL

## 2024-05-17 ENCOUNTER — OFFICE VISIT (OUTPATIENT)
Dept: PULMONOLOGY | Facility: CLINIC | Age: 59
End: 2024-05-17
Payer: COMMERCIAL

## 2024-05-17 ENCOUNTER — OFFICE VISIT (OUTPATIENT)
Dept: PODIATRY | Facility: CLINIC | Age: 59
End: 2024-05-17
Payer: COMMERCIAL

## 2024-05-17 VITALS
TEMPERATURE: 97.8 F | HEIGHT: 69 IN | WEIGHT: 263 LBS | DIASTOLIC BLOOD PRESSURE: 72 MMHG | SYSTOLIC BLOOD PRESSURE: 125 MMHG | BODY MASS INDEX: 38.95 KG/M2 | HEART RATE: 73 BPM | OXYGEN SATURATION: 90 %

## 2024-05-17 VITALS
TEMPERATURE: 97.7 F | WEIGHT: 263 LBS | HEIGHT: 69 IN | OXYGEN SATURATION: 93 % | HEART RATE: 79 BPM | DIASTOLIC BLOOD PRESSURE: 70 MMHG | SYSTOLIC BLOOD PRESSURE: 156 MMHG | BODY MASS INDEX: 38.95 KG/M2 | RESPIRATION RATE: 18 BRPM

## 2024-05-17 DIAGNOSIS — Z87.01 HISTORY OF PSEUDOMONAS PNEUMONIA: ICD-10-CM

## 2024-05-17 DIAGNOSIS — Z79.4 TYPE 2 DIABETES MELLITUS WITH HYPERGLYCEMIA, WITH LONG-TERM CURRENT USE OF INSULIN: ICD-10-CM

## 2024-05-17 DIAGNOSIS — J44.9 CHRONIC OBSTRUCTIVE PULMONARY DISEASE, UNSPECIFIED COPD TYPE: Primary | ICD-10-CM

## 2024-05-17 DIAGNOSIS — E66.01 CLASS 3 OBESITY: ICD-10-CM

## 2024-05-17 DIAGNOSIS — L97.529 ULCER OF FOOT, LEFT, WITH UNSPECIFIED SEVERITY: Primary | ICD-10-CM

## 2024-05-17 DIAGNOSIS — F17.201 TOBACCO ABUSE, IN REMISSION: ICD-10-CM

## 2024-05-17 DIAGNOSIS — R06.09 CHRONIC DYSPNEA: ICD-10-CM

## 2024-05-17 DIAGNOSIS — G62.9 PERIPHERAL POLYNEUROPATHY: ICD-10-CM

## 2024-05-17 DIAGNOSIS — Z86.711 PERSONAL HISTORY OF PE (PULMONARY EMBOLISM): ICD-10-CM

## 2024-05-17 DIAGNOSIS — J15.1 PNEUMONIA DUE TO PSEUDOMONAS SPECIES, UNSPECIFIED LATERALITY, UNSPECIFIED PART OF LUNG: ICD-10-CM

## 2024-05-17 DIAGNOSIS — J47.9 BRONCHIECTASIS WITHOUT COMPLICATION: ICD-10-CM

## 2024-05-17 DIAGNOSIS — E11.65 TYPE 2 DIABETES MELLITUS WITH HYPERGLYCEMIA, WITH LONG-TERM CURRENT USE OF INSULIN: ICD-10-CM

## 2024-05-17 DIAGNOSIS — G47.33 OBSTRUCTIVE SLEEP APNEA: ICD-10-CM

## 2024-05-17 PROCEDURE — 3074F SYST BP LT 130 MM HG: CPT | Performed by: PODIATRIST

## 2024-05-17 PROCEDURE — 3078F DIAST BP <80 MM HG: CPT | Performed by: PODIATRIST

## 2024-05-17 PROCEDURE — 99213 OFFICE O/P EST LOW 20 MIN: CPT | Performed by: PODIATRIST

## 2024-05-17 RX ORDER — ARFORMOTEROL TARTRATE 15 UG/2ML
15 SOLUTION RESPIRATORY (INHALATION)
Qty: 360 ML | Refills: 3 | Status: SHIPPED | OUTPATIENT
Start: 2024-05-17

## 2024-05-17 RX ORDER — BUDESONIDE 0.5 MG/2ML
0.5 INHALANT ORAL 2 TIMES DAILY
Qty: 360 ML | Refills: 3 | Status: SHIPPED | OUTPATIENT
Start: 2024-05-17

## 2024-05-17 NOTE — PROGRESS NOTES
Primary Care Provider  Kimmy Riley MD   Referring Provider  No ref. provider found    Patient Complaint  Follow-up (2 Weeks HOSP)      SUBJECTIVE    History of Presenting Illness  Preston Wallis is a pleasant 58 y.o. male who presents to [facility] for hospital follow-up. Patient is here with spouse. He was at [facility] 4/28 through 5/3/2024 for acute on chronic hypercapnic hypoxic respiratory failure, obesity hypoventilation syndrome pneumonia Pseudomonas species.  Hospital course includes the following: Direct transfer from an outside facility. 58-year-old male hospitalized on 4/28/2024 with increased lethargy, elevated blood sugars, shortness of breath with underlying history of chronic hypoxemic and hypercapnic respiratory failure with NIPPV device, MILADY, seizure disorder, CKD stage IIIb, recent CABG, BPH with history of self catheterizations, diabetic foot ulcers, recent hospitalization for Pseudomonas pneumonia, placed in the critical care unit here, status post thoracentesis, mentation improved, Pseudomonas on sputum culture, not sensitive to Levaquin, needing to be treated with cefepime. Did require blood transfusions for hemoglobin stabilization. IV antibiotics arranged for discharge planning for home. Tolerating NIPPV. Continued on diuretics. Pulmonary consulted. Issues with Port-A-Cath, excessive bleeding. Port-A-Cath reaccessed. Respiratory culture grew group A strep and Pseudomonas, discharged on MOIZ nebs and Zosyn IV to complete several days of IV antibiotics, patient discharged at patient's request on 5/3/2024, in hemodynamic stable condition to follow-up with pulmonary as outpatient.  He underwent a thoracentesis on 4/29/2024 which was negative for malignant cells, no AFB or fungus isolated at 2 weeks, body fluid culture negative.    Patient has issues with compliance with NIPPV and chest vest last hospital admission he was  arranged to have a NIPPV and chest vest for compliance reports he has been noncompliant since his been discharged.  It is recommended for him to continue with home NIPPV and chest vest therapy.      Mr. Wallis is a patient of Dr. Carolina's and Betzaida Nelson nurse practitioner.  He last saw Betzaida Nelson 4/4/2024.    His medical history includes bronchiectasis, very severe COPD, recurrent Pseudomonas infections MDRO, CKD stage III/IV, MILADY not adherent to BiPAP.  He does continue on O2 at 2 L continuous nasal cannula.    He does have shortness of air with rest and exertional activities. He recovers with rest and his 02 which he is using at 2 L via NC continuous.  He states he has been using his rescue duo nebulizer in the treatment before his visit today.  Patient has been on the tobramycin nebulizers he was prescribed at hospital discharge.  Patient states he is trying to do better with using his NIPPV but states will fall asleep and forget to put it on.  At present time he denies headaches, chest pain, weight loss or hemoptysis. Denies fevers, chills and night sweats. Preston Wallis is able to perform ADLs without difficulties and denies any swollen glands/lymph nodes in the head or neck.  Patient states he will get to feeling better and then stop using his medications equipment like he supposed to and then ends up back in the hospital.  However patient states he has started using his chest vest since getting out of the hospital.  He is using it twice daily and is benefiting from that use as it does help him cough up very thick mucus.    I have personally reviewed the review of systems, past family, social, medical and surgical histories; and agree with their findings.    Review of Systems  Constitutional symptoms:  Denied complaints   Ear, nose, throat: Denied complaints  Cardiovascular:  Denied complaints  Respiratory: Shortness of air, cough, wheeze  Gastrointestinal: Denied complaints  Musculoskeletal: Denied  "complaints    Family History   Problem Relation Age of Onset    Coronary artery disease Mother     Hypertension Mother     Diabetes type II Mother     Asthma Father     Diabetes type II Sister     Cancer Sister         Social History     Socioeconomic History    Marital status:    Tobacco Use    Smoking status: Former     Current packs/day: 0.00     Average packs/day: 1 pack/day for 12.0 years (12.0 ttl pk-yrs)     Types: Cigarettes     Start date:      Quit date:      Years since quittin.3     Passive exposure: Past    Smokeless tobacco: Never   Vaping Use    Vaping status: Never Used   Substance and Sexual Activity    Alcohol use: Not Currently    Drug use: Never    Sexual activity: Defer        Past Medical History:   Diagnosis Date    Age-related cognitive decline     Allergic contact dermatitis     Allergies     Anemia     Bedbound     2023 \"MY LEG MUSCLES STOPPED WORKING\"    Bronchiectasis with acute lower respiratory infection     Charcot foot due to diabetes mellitus 09/10/2013    Chronic diastolic (congestive) heart failure     Chronic kidney disease     Chronic respiratory failure with hypoxia     Closed supracondylar fracture of femur 2022    COPD (chronic obstructive pulmonary disease)     Deep vein thrombosis (DVT) of lower extremity associated with air travel 2023    Dependence on supplemental oxygen     Eczema     Erectile dysfunction     due to organic reasons    Essential (primary) hypertension     Fracture     closed fracture of other tarsal and metatarsal bones    Fracture of proximal humerus 2023    GERD without esophagitis     High risk medication use     Hypercholesteremia     Hypomagnesemia     Infected stasis ulcer of left lower extremity 2023    Insomnia     Low back pain     Major depressive disorder     Morbid (severe) obesity due to excess calories     MRSA pneumonia     Muscle weakness     Non-pressure chronic ulcer of other part of " unspecified foot with bone involvement without evidence of necrosis     Obstructive sleep apnea (adult) (pediatric)     On home O2     REPORTS WEARING 2L/NC AAT    Other forms of dyspnea     Other long term (current) drug therapy     Other specified noninfective gastroenteritis and colitis     Other spondylosis, lumbar region     Pain in both knees     Paroxysmal atrial fibrillation     Peripheral neuropathy     attributed to type 2 diabetes    Pneumonia, unspecified organism     Polyneuropathy     Rash and other nonspecific skin eruption     Syncope and collapse     Tachycardia     Tinnitus 01/13/2023    Type 1 diabetes mellitus with diabetic chronic kidney disease     Type 2 diabetes mellitus     Unspecified fall, initial encounter     Urinary retention         Immunization History   Administered Date(s) Administered    Flu Vaccine Quad PF >36MO 10/18/2016, 10/16/2017, 11/04/2019    Flu Vaccine Split Quad 11/04/2019    Fluzone (or Fluarix & Flulaval for VFC) >6mos 10/18/2016, 10/16/2017, 11/04/2019, 10/19/2022, 11/14/2023    Influenza Injectable Mdck Pf Quad 10/19/2022    Influenza Quad Vaccine (Inpatient) 09/19/2013    Influenza, Unspecified 09/21/2020    PEDS-Pneumococcal Conjugate (PCV7) 11/14/2023    Pneumococcal Conjugate 20-Valent (PCV20) 11/14/2023    Pneumococcal Polysaccharide (PPSV23) 11/20/1997    Tdap 09/18/2018    influenza Split 11/04/2019       Allergies   Allergen Reactions    Benadryl [Diphenhydramine] Itching    Proventil [Albuterol] Other (See Comments)     Mouth sores            Current Outpatient Medications:     apixaban (Eliquis) 5 MG tablet tablet, Take 1 tablet by mouth Every 12 (Twelve) Hours., Disp: 60 tablet, Rfl: 2    aspirin 81 MG EC tablet, Take 1 tablet by mouth Every Morning., Disp: 30 tablet, Rfl: 2    atorvastatin (LIPITOR) 20 MG tablet, Take 1 tablet by mouth Every Night., Disp: 30 tablet, Rfl: 2    bumetanide (BUMEX) 2 MG tablet, Take 1 tablet by mouth 2 (Two) Times a Day.,  Disp: 60 tablet, Rfl: 2    busPIRone (BUSPAR) 15 MG tablet, Take 1 tablet by mouth 2 (Two) Times a Day., Disp: 60 tablet, Rfl: 2    calcium citrate (CALCITRATE) 950 (200 Ca) MG tablet, Take 1 tablet by mouth Every Night., Disp: 30 tablet, Rfl: 2    carvedilol (COREG) 25 MG tablet, Take 1 tablet by mouth Every 12 (Twelve) Hours., Disp: 60 tablet, Rfl: 2    Cholecalciferol (Vitamin D3) 50 MCG (2000 UT) tablet, Take 1 tablet by mouth Every Morning., Disp: 30 tablet, Rfl: 2    dapagliflozin (Farxiga) 5 MG tablet tablet, Take 1 tablet by mouth Every Morning., Disp: 30 tablet, Rfl: 2    docusate sodium (COLACE) 100 MG capsule, Take 1 capsule by mouth Every Night., Disp: 30 capsule, Rfl: 2    DULoxetine (CYMBALTA) 30 MG capsule, Take 1 capsule by mouth Every Morning., Disp: 30 capsule, Rfl: 2    exenatide er (BYDUREON) 2 MG/0.85ML auto-injector injection, Inject 0.85 mL under the skin into the appropriate area as directed 1 (One) Time Per Week., Disp: , Rfl:     famotidine (PEPCID) 40 MG tablet, Take 1 tablet by mouth Every Morning., Disp: 30 tablet, Rfl: 2    ferrous gluconate (FERGON) 324 MG tablet, Take 1 tablet by mouth Every Morning., Disp: 30 tablet, Rfl: 2    finasteride (PROSCAR) 5 MG tablet, Take 1 tablet by mouth Every Night., Disp: 30 tablet, Rfl: 2    folic acid (FOLVITE) 1 MG tablet, Take 1 tablet by mouth Every Night., Disp: 30 tablet, Rfl: 2    insulin detemir (Levemir) 100 UNIT/ML injection, Inject 10 Units under the skin into the appropriate area as directed Every Night., Disp: 15 mL, Rfl: 2    Insulin Lispro (humaLOG) 100 UNIT/ML injection, Inject 8 Units under the skin into the appropriate area as directed 3 (Three) Times a Day Before Meals. Per sliding scale, Disp: 15 mL, Rfl: 0    ipratropium-albuterol (DUO-NEB) 0.5-2.5 mg/3 ml nebulizer, Take 3 mL by nebulization Every 4 (Four) Hours As Needed for Wheezing or Shortness of Air., Disp: 360 mL, Rfl: 5    Ketoconazole 1 % shampoo, Apply 10 mL topically  "Every 3 (Three) Days., Disp: 200 mL, Rfl: 0    levETIRAcetam (KEPPRA) 500 MG tablet, Take 1 tablet by mouth 2 (Two) Times a Day., Disp: 60 tablet, Rfl: 2    magnesium oxide (MAG-OX) 400 tablet tablet, Take 1 tablet by mouth Every Night., Disp: 30 tablet, Rfl: 2    montelukast (SINGULAIR) 10 MG tablet, Take 1 tablet by mouth Every Night., Disp: 30 tablet, Rfl: 2    Multiple Vitamins-Iron (multivitamin with iron) tablet tablet, Take 1 tablet by mouth Every Morning., Disp: 30 tablet, Rfl: 2    NIFEdipine XL (PROCARDIA XL) 30 MG 24 hr tablet, Take 1 tablet by mouth Every Morning., Disp: 30 tablet, Rfl: 2    O2 (OXYGEN), 2 Liter O2 - CONTINUOUS (route: Oxygen), Disp: , Rfl:     tamsulosin (FLOMAX) 0.4 MG capsule 24 hr capsule, Take 1 capsule by mouth Every Night., Disp: 30 capsule, Rfl: 2    tobramycin PF (MOIZ) 300 MG/5ML nebulizer solution, Take 5 mL by nebulization Every 12 (Twelve) Hours for 52 doses. Indications: Pneumonia, Disp: 260 mL, Rfl: 0    traZODone (DESYREL) 50 MG tablet, Take 1 tablet by mouth Every Night., Disp: 30 tablet, Rfl: 2    vitamin C (ASCORBIC ACID) 500 MG tablet, Take 1 tablet by mouth 2 (Two) Times a Day., Disp: 60 tablet, Rfl: 2    arformoterol (BROVANA) 15 MCG/2ML nebulizer solution, Take 2 mL by nebulization 2 (Two) Times a Day., Disp: 360 mL, Rfl: 3    budesonide (Pulmicort) 0.5 MG/2ML nebulizer solution, Take 2 mL by nebulization 2 (Two) Times a Day., Disp: 360 mL, Rfl: 3           Vital Signs   /70 (BP Location: Left arm, Patient Position: Sitting, Cuff Size: Adult)   Pulse 79   Temp 97.7 °F (36.5 °C) (Temporal)   Resp 18   Ht 175.3 cm (69\")   Wt 119 kg (263 lb)   SpO2 93% Comment: 2L PD  BMI 38.84 kg/m²       OBJECTIVE    Physical Exam  Vital Signs Reviewed   WDWN, Alert, NAD.    HEENT:  PERRL, EOMI.  OP, nares clear  Neck:  Supple, no JVD, no thyromegaly  Chest: Occasional inspiratory wheeze upper posterior lobes, no rhonchi no crackles noted, no work of breathing " noted  CV: RRR, no MGR, pulses 2+, equal.  Abd:  Soft, NT, ND, + BS, no HSM Doyle cath present  EXT:  no clubbing, no cyanosis, no edema  Neuro:  A&Ox3, CN grossly intact, no focal deficits.  Skin: No rashes or lesions noted    Results Review  I have personally reviewed the prior office notes, hospital records, labs, and diagnostics.  Non-gynecologic Cytology: [id number]  Order: [id number]  Status: Final result       Visible to patient: Yes (not seen)       Next appt: 05/20/2024 at 10:30 AM in Urology (Teresita White MD)    Specimen Information: Pleural Cavity; Pleural Fluid   0 Result Notes      Component    Case Report   Medical Cytology Report                           Case: [id number]                                   Authorizing Provider:  Severiano Carolina MD       Collected:           04/29/2024 10:03 AM           Ordering Location:     [facility]      Received:            04/30/2024 09:48 AM                                  CORONARY CARE UNIT                                                           Pathologist:           Severiano Bruce MD                                                             Specimen:    Pleural Cavity                                                                            Final Diagnosis   Pleural fluid, right, thoracentesis:                - Negative for malignant cells     Comment:  The following test was performed at a reference laboratory on the pleural fluid. See separate reference laboratory report for additional information.       Flow cytometry: No significant lymphoid immunophenotypic abnormalities   Electronically signed by Severiano Bruce MD on 5/2/2024 at 1129   Clinical Information    Pleural effusion.   Gross Description    1. Pleural Cavity.  Pleural fluid, Right               900 cc of bloody, brittney, slightly fibrinous fluid received (1 cytospin prep prepared).      Microscopic Description    Microscopic examination performed.   [facility]   [facility]              Specimen Collected: 04/29/24 10:03 EDT Last Resulted: 05/02/24 11:29 EDT         CT Chest Without Contrast Diagnostic [[id number]] (Order [id number])  Order  Status: Final result     Study Notes     Hannah Pressley on 4/15/2024  5:08 PM EDT   TECH ROBERT  DLP 5157  CTDI 16.05  PT POOR HISTORIAN, ON BI-PAP, COULD NOT BRING ARM ABOVE HEAD, BEST FILMS     Appointment Information    PACS Images     Radiology Images  Study Result    Narrative & Impression   CT CHEST WO CONTRAST DIAGNOSTIC-     Date of Exam: 4/15/2024 4:57 PM     Indication: soa, possible pna, plueral effusion eval,  pmhx  copd/chf/morb obesity/laura; J96.02-Acute respiratory failure with  hypercapnia.     Comparison: Chest x-ray 4/14/2024, CT chest 12/9/2023     Technique: Axial CT images were obtained of the chest without contrast  administration.  Reconstructed coronal and sagittal images were also  obtained. Automated exposure control and iterative construction methods  were used.        Findings:     Hilum and Mediastinum: Moderate coronary artery atherosclerotic disease.   No pericardial effusion.  Unremarkable thoracic aorta and pulmonary  arteries. There is a right-sided port. The tip terminates at the  superior vena cava. There is a residual port in the left soft tissues  and internal jugular terminating at the cavoatrial junction.  Cardiomegaly. Precarinal lymph node is enlarged. On image 121 it  measures 1.3 cm in short axis. This is similar to previous exam.     Lung Parenchyma and Pleura: There are moderate bilateral pleural  effusions. These are new. There is bronchiectasis which is varicose.  There is bibasilar airspace opacity right greater than left pneumonia  favored over atelectasis. There is scattered geographic groundglass  opacity. There is new consolidation in the lateral right upper lobe. In  the lingula there is a noncalcified nodule on image #217. It measures 9  mm. There is surrounding groundglass opacity. This is  new.     Upper Abdomen: Unremarkable.     Soft tissues: Unremarkable.     Osseous structures: No aggressive focal lytic or sclerotic osseous  lesions. Osteopenia.     IMPRESSION:  1.  Moderate bilateral pleural effusions. Bibasilar opacity right  greater than left. Pneumonia most likely.  2.  Noncalcified nodule in the lingula measuring 9 mm. Scattered areas  of groundglass with mild septal thickening likely due to superimposed  pulmonary edema.  3.  Severe varicose and cystic bronchiectasis.  4.  New opacity in the lateral right upper lobe atelectasis versus  pneumonia.  5.  A follow-up CT chest in 3 months time is recommended to evaluate for  the resolution of the noncalcified nodules and parenchymal opacities.     Electronically Signed By-Francine Garner MD On:4/15/2024 5:19 PM   XR Chest 1 View [[id number]] (Order [id number])  Order  Status: Final result     Appointment Information    PACS Images     Radiology Images  Study Result    Narrative & Impression   XR CHEST 1 VW-     Date of Exam: 4/29/2024 11:03 AM     Indication: s/p right Thoracentesis     Comparison: 4/28/2024     Findings:  No pneumothorax status post right thoracentesis. Interval decrease in  size of right pleural effusion. No significant change in multifocal  bilateral airspace disease. Heart size grossly stable     IMPRESSION:  Impression:     1. No pneumothorax status post right thoracentesis  2. Grossly stable multifocal bilateral airspace disease        Electronically Signed By-Derrick Cortez On:4/29/2024 11:17 AM     ASSESSMENT         Patient Active Problem List   Diagnosis    Pneumonia due to COVID-19 virus    Polyneuropathy    Paroxysmal atrial fibrillation    Obstructive sleep apnea    MRSA pneumonia    Low back pain    Chronic diastolic heart failure    Allergies    COPD exacerbation    Chronic anticoagulation    Benign prostatic hyperplasia    Impaired mobility and endurance    Stage 3a chronic kidney disease    Iron deficiency anemia  secondary to inadequate dietary iron intake    Vitamin D deficiency    Class 3 severe obesity with serious comorbidity in adult    Lower extremity edema    Elevated alkaline phosphatase level    Venous insufficiency (chronic) (peripheral)    Tobacco abuse, in remission    History of Pseudomonas pneumonia    Chronic dyspnea    Gastroesophageal reflux disease    Bronchiectasis without complication    ERIC (acute kidney injury)    Altered mental status    Hyperlipidemia    Luetscher's syndrome    Neurogenic bladder    Class 1 obesity    Pneumonia of both lungs due to Pseudomonas species    Seizures    Primary osteoarthritis of left knee    Other constipation    Chronic obstructive pulmonary disease    Type 2 DM with CKD stage 3 and hypertension    Essential hypertension    Stage 3b chronic kidney disease    Annual physical exam    Long-term use of high-risk medication    Personal history of PE (pulmonary embolism)    Encounter for aftercare for healing closed traumatic fracture of left femur    Chronic pain of left knee    Primary osteoarthritis of right knee    Sepsis    Bronchiectasis with acute exacerbation    Bacterial pneumonia    Anemia    Closed fracture of left tibial plateau    Septic joint    Septic arthritis    Skin ulcer of left heel, limited to breakdown of skin    Ulcer of left foot, limited to breakdown of skin    Left foot pain    Wheezing    Acute on chronic respiratory failure with hypercapnia    Chronic respiratory failure with hypoxia and hypercapnia    Acute hypoxic on chronic hypercapnic respiratory failure    Impaired cognition       Encounter Diagnoses   Name Primary?    Chronic obstructive pulmonary disease, unspecified COPD type Yes    Bronchiectasis without complication     Chronic dyspnea     Tobacco abuse, in remission     History of Pseudomonas pneumonia     Obstructive sleep apnea     Personal history of PE (pulmonary embolism)     Class 3 obesity     Pneumonia due to Pseudomonas species,  unspecified laterality, unspecified part of lung       PLAN  -At this time we will send him Brovana Pulmicort maintenance nebulizers maintenance with instructions in use, sent these to [facility]  -Patient recommend to continue with his albuterol inhaler and/or duo nebulizer as needed for shortness of air or wheeze  -patient to continue with O2 at 2 L titrating to keep his sats above 89%  -Patient to continue tobramycin nebulizer  -Continue to notify patient for AFB and fungal culture results to final from thoracentesis  -Patient has completed his antibiotic for his Pseudomonas infection  -Follow-up CT has been ordered  -Encourage NIPPV as needed with naps and at bedtime  -Patient to follow back up with Dr. Carolina or Betzaida Nelson at [facility] per patient request      Diagnoses and all orders for this visit:    1. Chronic obstructive pulmonary disease, unspecified COPD type (Primary)  -     arformoterol (BROVANA) 15 MCG/2ML nebulizer solution; Take 2 mL by nebulization 2 (Two) Times a Day.  Dispense: 360 mL; Refill: 3  -     budesonide (Pulmicort) 0.5 MG/2ML nebulizer solution; Take 2 mL by nebulization 2 (Two) Times a Day.  Dispense: 360 mL; Refill: 3  -     CT Chest Without Contrast; Future    2. Bronchiectasis without complication  -     arformoterol (BROVANA) 15 MCG/2ML nebulizer solution; Take 2 mL by nebulization 2 (Two) Times a Day.  Dispense: 360 mL; Refill: 3  -     budesonide (Pulmicort) 0.5 MG/2ML nebulizer solution; Take 2 mL by nebulization 2 (Two) Times a Day.  Dispense: 360 mL; Refill: 3  -     CT Chest Without Contrast; Future    3. Chronic dyspnea  -     arformoterol (BROVANA) 15 MCG/2ML nebulizer solution; Take 2 mL by nebulization 2 (Two) Times a Day.  Dispense: 360 mL; Refill: 3  -     budesonide (Pulmicort) 0.5 MG/2ML nebulizer solution; Take 2 mL by nebulization 2 (Two) Times a Day.  Dispense: 360 mL; Refill: 3  -     CT Chest Without Contrast; Future    4. Tobacco abuse, in  remission  -     arformoterol (BROVANA) 15 MCG/2ML nebulizer solution; Take 2 mL by nebulization 2 (Two) Times a Day.  Dispense: 360 mL; Refill: 3  -     budesonide (Pulmicort) 0.5 MG/2ML nebulizer solution; Take 2 mL by nebulization 2 (Two) Times a Day.  Dispense: 360 mL; Refill: 3  -     CT Chest Without Contrast; Future    5. History of Pseudomonas pneumonia  -     arformoterol (BROVANA) 15 MCG/2ML nebulizer solution; Take 2 mL by nebulization 2 (Two) Times a Day.  Dispense: 360 mL; Refill: 3  -     budesonide (Pulmicort) 0.5 MG/2ML nebulizer solution; Take 2 mL by nebulization 2 (Two) Times a Day.  Dispense: 360 mL; Refill: 3  -     CT Chest Without Contrast; Future    6. Obstructive sleep apnea  -     arformoterol (BROVANA) 15 MCG/2ML nebulizer solution; Take 2 mL by nebulization 2 (Two) Times a Day.  Dispense: 360 mL; Refill: 3  -     budesonide (Pulmicort) 0.5 MG/2ML nebulizer solution; Take 2 mL by nebulization 2 (Two) Times a Day.  Dispense: 360 mL; Refill: 3  -     CT Chest Without Contrast; Future    7. Personal history of PE (pulmonary embolism)  -     arformoterol (BROVANA) 15 MCG/2ML nebulizer solution; Take 2 mL by nebulization 2 (Two) Times a Day.  Dispense: 360 mL; Refill: 3  -     budesonide (Pulmicort) 0.5 MG/2ML nebulizer solution; Take 2 mL by nebulization 2 (Two) Times a Day.  Dispense: 360 mL; Refill: 3  -     CT Chest Without Contrast; Future    8. Class 3 obesity  -     arformoterol (BROVANA) 15 MCG/2ML nebulizer solution; Take 2 mL by nebulization 2 (Two) Times a Day.  Dispense: 360 mL; Refill: 3  -     budesonide (Pulmicort) 0.5 MG/2ML nebulizer solution; Take 2 mL by nebulization 2 (Two) Times a Day.  Dispense: 360 mL; Refill: 3  -     CT Chest Without Contrast; Future    9. Pneumonia due to Pseudomonas species, unspecified laterality, unspecified part of lung  -     arformoterol (BROVANA) 15 MCG/2ML nebulizer solution; Take 2 mL by nebulization 2 (Two) Times a Day.  Dispense: 360 mL;  Refill: 3  -     budesonide (Pulmicort) 0.5 MG/2ML nebulizer solution; Take 2 mL by nebulization 2 (Two) Times a Day.  Dispense: 360 mL; Refill: 3  -     CT Chest Without Contrast; Future           Smoking status:former  Vaccination status:reviewed  Medications personally reviewed    Follow Up  Return in about 3 months (around 8/17/2024) for after CT scan with Dr. Carolina or JOSSELIN Fields.    Patient was given instructions and counseling regarding his condition or for health maintenance advice. Please see specific information pulled into the AVS if appropriate.      Patient has NIPPV for worsening disease of acute on chronic respiratory failure secondary to COPD. He requires the non-invasive ventilation nightly and as needed during the day to help reduce his pC02 levels and improve his quality of life. AVAPS-AE is the optimal therapy and will help manage his C02 by adjusting pressures from breath to breath, by giving Target Tidal Volume, which will reduce C02 levels and work of breathing. Patient is currently using and benefiting from the NIPPV.  The obstructive aspect of patient's worsening COPD will be addressed by utilizing an E PAP range offered by the A/E function of AVAPS-AE.  The AVAPS and AE features will give the patient the appropriate pressure when changes occur, not offered on BiPAP.  He will require a battery backup as interruption to therapy or power failure may cause serious medical consequences, rehospitalization's, and/or possible death which BiPAP does not provide. A modem will be placed allowing for remote monitoring.    I spent 50 minutes caring for Preston Wallis on this date of service. This time includes time spent by me in the following activities:preparing for the visit, reviewing tests, obtaining and/or reviewing a separately obtained history, performing a medically appropriate examination and/or evaluation, counseling and educating the patient/family/caregiver, ordering  medications, tests, or procedures, documenting information in the medical record, independently interpreting results and communicating that information with the patient/family/caregiver and answered questions family members, discuss medications.

## 2024-05-20 ENCOUNTER — OFFICE VISIT (OUTPATIENT)
Dept: UROLOGY | Facility: CLINIC | Age: 59
End: 2024-05-20
Payer: COMMERCIAL

## 2024-05-20 VITALS
HEART RATE: 67 BPM | HEIGHT: 69 IN | WEIGHT: 263 LBS | BODY MASS INDEX: 38.95 KG/M2 | SYSTOLIC BLOOD PRESSURE: 133 MMHG | DIASTOLIC BLOOD PRESSURE: 54 MMHG

## 2024-05-20 DIAGNOSIS — N13.8 BPH WITH URINARY OBSTRUCTION: ICD-10-CM

## 2024-05-20 DIAGNOSIS — Z97.8 FOLEY CATHETER IN PLACE: ICD-10-CM

## 2024-05-20 DIAGNOSIS — E10.8 DIABETES MELLITUS TYPE 1 WITH COMPLICATIONS: ICD-10-CM

## 2024-05-20 DIAGNOSIS — N40.1 BPH WITH URINARY OBSTRUCTION: ICD-10-CM

## 2024-05-20 DIAGNOSIS — R33.9 URINARY RETENTION: Primary | ICD-10-CM

## 2024-05-20 DIAGNOSIS — N31.2 NEUROGENIC BLADDER, FLACCID: ICD-10-CM

## 2024-05-20 LAB
BILIRUB BLD-MCNC: NEGATIVE MG/DL
CLARITY, POC: ABNORMAL
COLOR UR: YELLOW
EXPIRATION DATE: ABNORMAL
GLUCOSE UR STRIP-MCNC: ABNORMAL MG/DL
KETONES UR QL: NEGATIVE
LEUKOCYTE EST, POC: ABNORMAL
Lab: ABNORMAL
NITRITE UR-MCNC: NEGATIVE MG/ML
PH UR: 5.5 [PH] (ref 5–8)
PROT UR STRIP-MCNC: ABNORMAL MG/DL
RBC # UR STRIP: ABNORMAL /UL
SP GR UR: 1.02 (ref 1–1.03)
UROBILINOGEN UR QL: NORMAL

## 2024-05-20 NOTE — PROGRESS NOTES
"    [facility] - PODIATRY    Today's Date: 05/20/24    Patient Name: Preston Wallis  MRN: [id number]  CSN: [id number]  PCP: Kimmy Riley MD,   Referring Provider: Kimmy Riley MD    SUBJECTIVE     Chief Complaint   Patient presents with    Left Foot - Follow-up, Foot Ulcer     Hospital follow up           HPI: Preston Wallis, a 58 y.o.male, presents to clinic.    Patient is a 58-year-old male presenting with wounds on his left foot and heel.  Patient is diabetic and has history of Charcot arthropathy.  Patient was recently admitted due to multiple medical issues.  Patient states that he has had wounds on his feet for several months.  Patient says that he he does very minimal walking.  Past Medical History:   Diagnosis Date    Age-related cognitive decline     Allergic contact dermatitis     Allergies     Anemia     Bedbound     11/2023 \"MY LEG MUSCLES STOPPED WORKING\"    Bronchiectasis with acute lower respiratory infection     Charcot foot due to diabetes mellitus 09/10/2013    Chronic diastolic (congestive) heart failure     Chronic kidney disease     Chronic respiratory failure with hypoxia     Closed supracondylar fracture of femur 01/12/2022    COPD (chronic obstructive pulmonary disease)     Deep vein thrombosis (DVT) of lower extremity associated with air travel 01/13/2023    Dependence on supplemental oxygen     Eczema     Erectile dysfunction     due to organic reasons    Essential (primary) hypertension     Fracture     closed fracture of other tarsal and metatarsal bones    Fracture of proximal humerus 01/13/2023    GERD without esophagitis     High risk medication use     Hypercholesteremia     Hypomagnesemia     Infected stasis ulcer of left lower extremity 01/13/2023    Insomnia     Low back pain     Major depressive disorder     Morbid (severe) obesity due to excess calories     MRSA pneumonia     Muscle weakness     Non-pressure chronic ulcer of other part of unspecified foot " with bone involvement without evidence of necrosis     Obstructive sleep apnea (adult) (pediatric)     On home O2     REPORTS WEARING 2L/NC AAT    Other forms of dyspnea     Other long term (current) drug therapy     Other specified noninfective gastroenteritis and colitis     Other spondylosis, lumbar region     Pain in both knees     Paroxysmal atrial fibrillation     Peripheral neuropathy     attributed to type 2 diabetes    Pneumonia, unspecified organism     Polyneuropathy     Rash and other nonspecific skin eruption     Syncope and collapse     Tachycardia     Tinnitus 2023    Type 1 diabetes mellitus with diabetic chronic kidney disease     Type 2 diabetes mellitus     Unspecified fall, initial encounter     Urinary retention      Past Surgical History:   Procedure Laterality Date    CHOLECYSTECTOMY      CYSTOSCOPY      FEMUR SURGERY Left     Shravan placed    KNEE SURGERY Left     OTHER SURGICAL HISTORY Left     venous port, REMOVED    PORTACATH PLACEMENT Right     TIBIAL PLATEAU OPEN REDUCTION INTERNAL FIXATION Left 2023    Procedure: TIBIAL PLATEAU OPEN REDUCTION INTERNAL FIXATION;  Surgeon: Hugo Kline MD;  Location: [facility];  Service: Orthopedics;  Laterality: Left;    TONSILLECTOMY AND ADENOIDECTOMY       Family History   Problem Relation Age of Onset    Coronary artery disease Mother     Hypertension Mother     Diabetes type II Mother     Asthma Father     Diabetes type II Sister     Cancer Sister      Social History     Socioeconomic History    Marital status:    Tobacco Use    Smoking status: Former     Current packs/day: 0.00     Average packs/day: 1 pack/day for 12.0 years (12.0 ttl pk-yrs)     Types: Cigarettes     Start date:      Quit date:      Years since quittin.4     Passive exposure: Past    Smokeless tobacco: Never   Vaping Use    Vaping status: Never Used   Substance and Sexual Activity    Alcohol use: Not Currently    Drug use: Never     Sexual activity: Defer     Allergies   Allergen Reactions    Benadryl [Diphenhydramine] Itching    Proventil [Albuterol] Other (See Comments)     Mouth sores       Current Outpatient Medications   Medication Sig Dispense Refill    apixaban (Eliquis) 5 MG tablet tablet Take 1 tablet by mouth Every 12 (Twelve) Hours. 60 tablet 2    aspirin 81 MG EC tablet Take 1 tablet by mouth Every Morning. 30 tablet 2    atorvastatin (LIPITOR) 20 MG tablet Take 1 tablet by mouth Every Night. 30 tablet 2    bumetanide (BUMEX) 2 MG tablet Take 1 tablet by mouth 2 (Two) Times a Day. 60 tablet 2    busPIRone (BUSPAR) 15 MG tablet Take 1 tablet by mouth 2 (Two) Times a Day. 60 tablet 2    calcium citrate (CALCITRATE) 950 (200 Ca) MG tablet Take 1 tablet by mouth Every Night. 30 tablet 2    carvedilol (COREG) 25 MG tablet Take 1 tablet by mouth Every 12 (Twelve) Hours. 60 tablet 2    Cholecalciferol (Vitamin D3) 50 MCG (2000 UT) tablet Take 1 tablet by mouth Every Morning. 30 tablet 2    dapagliflozin (Farxiga) 5 MG tablet tablet Take 1 tablet by mouth Every Morning. 30 tablet 2    docusate sodium (COLACE) 100 MG capsule Take 1 capsule by mouth Every Night. 30 capsule 2    DULoxetine (CYMBALTA) 30 MG capsule Take 1 capsule by mouth Every Morning. 30 capsule 2    exenatide er (BYDUREON) 2 MG/0.85ML auto-injector injection Inject 0.85 mL under the skin into the appropriate area as directed 1 (One) Time Per Week.      famotidine (PEPCID) 40 MG tablet Take 1 tablet by mouth Every Morning. 30 tablet 2    ferrous gluconate (FERGON) 324 MG tablet Take 1 tablet by mouth Every Morning. 30 tablet 2    finasteride (PROSCAR) 5 MG tablet Take 1 tablet by mouth Every Night. 30 tablet 2    folic acid (FOLVITE) 1 MG tablet Take 1 tablet by mouth Every Night. 30 tablet 2    insulin detemir (Levemir) 100 UNIT/ML injection Inject 10 Units under the skin into the appropriate area as directed Every Night. 15 mL 2    Insulin Lispro (humaLOG) 100 UNIT/ML  injection Inject 8 Units under the skin into the appropriate area as directed 3 (Three) Times a Day Before Meals. Per sliding scale 15 mL 0    ipratropium-albuterol (DUO-NEB) 0.5-2.5 mg/3 ml nebulizer Take 3 mL by nebulization Every 4 (Four) Hours As Needed for Wheezing or Shortness of Air. 360 mL 5    Ketoconazole 1 % shampoo Apply 10 mL topically Every 3 (Three) Days. 200 mL 0    levETIRAcetam (KEPPRA) 500 MG tablet Take 1 tablet by mouth 2 (Two) Times a Day. 60 tablet 2    magnesium oxide (MAG-OX) 400 tablet tablet Take 1 tablet by mouth Every Night. 30 tablet 2    montelukast (SINGULAIR) 10 MG tablet Take 1 tablet by mouth Every Night. 30 tablet 2    Multiple Vitamins-Iron (multivitamin with iron) tablet tablet Take 1 tablet by mouth Every Morning. 30 tablet 2    NIFEdipine XL (PROCARDIA XL) 30 MG 24 hr tablet Take 1 tablet by mouth Every Morning. 30 tablet 2    O2 (OXYGEN) 2 Liter O2 - CONTINUOUS (route: Oxygen)      tobramycin PF (MOIZ) 300 MG/5ML nebulizer solution Take 5 mL by nebulization Every 12 (Twelve) Hours for 52 doses. Indications: Pneumonia 260 mL 0    traZODone (DESYREL) 50 MG tablet Take 1 tablet by mouth Every Night. 30 tablet 2    vitamin C (ASCORBIC ACID) 500 MG tablet Take 1 tablet by mouth 2 (Two) Times a Day. 60 tablet 2    arformoterol (BROVANA) 15 MCG/2ML nebulizer solution Take 2 mL by nebulization 2 (Two) Times a Day. 360 mL 3    budesonide (Pulmicort) 0.5 MG/2ML nebulizer solution Take 2 mL by nebulization 2 (Two) Times a Day. 360 mL 3     No current facility-administered medications for this visit.     Review of Systems   Skin:         Ulcer feet   All other systems reviewed and are negative.      OBJECTIVE     Vitals:    05/17/24 1028   BP: 125/72   Pulse: 73   Temp: 97.8 °F (36.6 °C)   SpO2: 90%       WBC   Date Value Ref Range Status   05/03/2024 11.17 (H) 3.40 - 10.80 10*3/mm3 Final   10/13/2020 10.79 4.80 - 10.80 10*3/uL Final   08/28/2020 9.47 4.5 - 11.0 10*3/uL Final     RBC    Date Value Ref Range Status   05/03/2024 2.97 (L) 4.14 - 5.80 10*6/mm3 Final   08/28/2020 3.31 (L) 4.5 - 5.9 10*6/uL Final     Hemoglobin   Date Value Ref Range Status   05/03/2024 8.2 (L) 13.0 - 17.7 g/dL Final   08/28/2020 9.0 (L) 13.5 - 17.5 g/dL Final     Hematocrit   Date Value Ref Range Status   05/03/2024 27.1 (L) 37.5 - 51.0 % Final   08/28/2020 28.8 (L) 41.0 - 53.0 % Final     MCV   Date Value Ref Range Status   05/03/2024 91.2 79.0 - 97.0 fL Final   08/28/2020 87.0 80.0 - 100.0 fL Final     MCH   Date Value Ref Range Status   05/03/2024 27.6 26.6 - 33.0 pg Final   08/28/2020 27.2 26.0 - 34.0 pg Final     MCHC   Date Value Ref Range Status   05/03/2024 30.3 (L) 31.5 - 35.7 g/dL Final   08/28/2020 31.3 31.0 - 37.0 g/dL Final     RDW   Date Value Ref Range Status   05/03/2024 14.6 12.3 - 15.4 % Final   08/28/2020 13.5 12.0 - 16.8 % Final     RDW-SD   Date Value Ref Range Status   05/03/2024 48.6 37.0 - 54.0 fl Final     MPV   Date Value Ref Range Status   05/03/2024 8.8 6.0 - 12.0 fL Final   08/28/2020 10.3 8.4 - 12.4 fL Final     Platelets   Date Value Ref Range Status   05/03/2024 296 140 - 450 10*3/mm3 Final   08/28/2020 177 140 - 440 10*3/uL Final     Neutrophil Rel %   Date Value Ref Range Status   08/28/2020 61.4 45 - 80 % Final     Neutrophil %   Date Value Ref Range Status   05/03/2024 71.5 42.7 - 76.0 % Final     Lymphocyte Rel %   Date Value Ref Range Status   08/28/2020 24.0 15 - 50 % Final     Lymphocyte %   Date Value Ref Range Status   05/03/2024 13.9 (L) 19.6 - 45.3 % Final     Monocyte Rel %   Date Value Ref Range Status   08/28/2020 9.7 0 - 15 % Final     Monocyte %   Date Value Ref Range Status   05/03/2024 9.2 5.0 - 12.0 % Final     Eosinophil %   Date Value Ref Range Status   05/03/2024 4.4 0.3 - 6.2 % Final   08/28/2020 3.4 0 - 7 % Final     Basophil Rel %   Date Value Ref Range Status   08/28/2020 0.5 0 - 2 % Final     Basophil %   Date Value Ref Range Status   05/03/2024 0.6 0.0 - 1.5  % Final     Immature Grans %   Date Value Ref Range Status   05/03/2024 0.4 0.0 - 0.5 % Final   08/28/2020 1.0 0.0 - 1.0 % Final     Neutrophils Absolute   Date Value Ref Range Status   12/22/2021 13.9 (H) 1.7 - 6.0 x10(3)/ul Final   08/28/2020 5.82 2.0 - 8.8 10*3/uL Final     Neutrophils, Absolute   Date Value Ref Range Status   05/03/2024 7.99 (H) 1.70 - 7.00 10*3/mm3 Final     Lymphocytes Absolute   Date Value Ref Range Status   08/28/2020 2.27 0.7 - 5.5 10*3/uL Final     Lymphocytes, Absolute   Date Value Ref Range Status   05/03/2024 1.55 0.70 - 3.10 10*3/mm3 Final     Monocytes Absolute   Date Value Ref Range Status   08/28/2020 0.92 0.0 - 1.7 10*3/uL Final     Monocytes, Absolute   Date Value Ref Range Status   05/03/2024 1.03 (H) 0.10 - 0.90 10*3/mm3 Final     Eosinophils Absolute   Date Value Ref Range Status   12/22/2021 0.1 0.0 - 0.6 x10(3)/ul Final   08/28/2020 0.32 0.0 - 0.8 10*3/uL Final     Eosinophils, Absolute   Date Value Ref Range Status   05/03/2024 0.49 (H) 0.00 - 0.40 10*3/mm3 Final     Basophils Absolute   Date Value Ref Range Status   12/22/2021 0.1 0.0 - 0.3 x10(3)/ul Final   08/28/2020 0.05 0.0 - 0.2 10*3/uL Final     Basophils, Absolute   Date Value Ref Range Status   05/03/2024 0.07 0.00 - 0.20 10*3/mm3 Final     Immature Grans, Absolute   Date Value Ref Range Status   05/03/2024 0.04 0.00 - 0.05 10*3/mm3 Final   08/28/2020 0.09 0.00 - 0.10 10*3/uL Final     nRBC   Date Value Ref Range Status   05/03/2024 0.0 0.0 - 0.2 /100 WBC Final         Lab Results   Component Value Date    GLUCOSE 123 (H) 05/03/2024    BUN 29 (H) 05/03/2024    CREATININE 2.21 (H) 05/03/2024    EGFRIFNONA 46 (L) 07/27/2021    BCR 13.1 05/03/2024    K 3.5 05/03/2024    CO2 36.7 (H) 05/03/2024    CALCIUM 9.0 05/03/2024    PROTENTOTREF 7.1 08/22/2022    ALBUMIN 2.7 (L) 05/03/2024    LABIL2 0.8 08/22/2022    AST 22 05/03/2024    ALT 19 05/03/2024       Patient seen in no apparent distress.      PHYSICAL EXAM:      Foot/Ankle Exam    GENERAL  Appearance:  appears stated age  Orientation:  AAOx3  Affect:  appropriate  Gait:  unimpaired  Assistance:  independent  Right shoe gear: casual shoe  Left shoe gear: casual shoe    VASCULAR     Right Foot Vascularity   Normal vascular exam    Dorsalis pedis:  2+  Posterior tibial:  2+  Skin temperature:  warm  Edema grading:  None  CFT:  < 3 seconds  Pedal hair growth:  Present  Varicosities:  none     Left Foot Vascularity   Normal vascular exam    Dorsalis pedis:  2+  Posterior tibial:  2+  Skin temperature:  warm  Edema grading:  None  CFT:  < 3 seconds  Pedal hair growth:  Present  Varicosities:  none     NEUROLOGIC     Right Foot Neurologic   Light touch sensation: absent  Vibratory sensation: absent  Hot/Cold sensation: absent  Protective Sensation using Marion-Deshaun Monofilament:   Sites intact: 10  Sites tested: 10     Left Foot Neurologic   Light touch sensation: absent  Vibratory sensation: absent  Hot/Cold sensation:  absent  Protective Sensation using Marion-Deshaun Monofilament:   Sites intact: 10  Sites tested: 10    MUSCLE STRENGTH     Right Foot Muscle Strength   Foot dorsiflexion:  2  Foot plantar flexion:  2  Foot inversion:  2  Foot eversion:  2     Left Foot Muscle Strength   Foot dorsiflexion:  2  Foot plantar flexion:  2  Foot inversion:  2  Foot eversion:  2    RANGE OF MOTION     Right Foot Range of Motion   Foot and ankle ROM within normal limits       Left Foot Range of Motion   Foot and ankle ROM within normal limits      DERMATOLOGIC      Right Foot Dermatologic   Skin  Right foot skin is intact.      Left Foot Dermatologic   Skin  Left foot skin is intact.      Left foot additional comments: Dry eschar to left heel and subfifth MPJ.  No erythema no malodor no drainage.      RADIOLOGY:        XR Chest 1 View    Result Date: 4/29/2024  Narrative: XR CHEST 1 VW-  Date of Exam: 4/29/2024 11:03 AM  Indication: s/p right Thoracentesis  Comparison: 4/28/2024   Findings: No pneumothorax status post right thoracentesis. Interval decrease in size of right pleural effusion. No significant change in multifocal bilateral airspace disease. Heart size grossly stable      Impression: Impression:  1. No pneumothorax status post right thoracentesis 2. Grossly stable multifocal bilateral airspace disease   Electronically Signed By-Derrick Dana On:4/29/2024 11:17 AM      XR chest AP portable    Result Date: 4/29/2024  Narrative: EXAMINATION TECHNIQUE: XR CHEST AP PORTABLE CLINICAL HISTORY: crackles on left COMPARISON: 4/12/2024 FINDINGS: Right chest wall Port-A-Cath. Worsening right lung opacities and pleural effusion. The left subclavian catheter is unchanged. Diffuse interstitial left lung opacities, not significantly changed. No appreciable pneumothorax. The right heart border is entirely obscured.    Impression: Worsening right lung opacities and right pleural effusion. Images personally reviewed, interpreted and dictated by ADI Lozada M.D.    XR Chest 1 View    Result Date: 4/29/2024  Narrative: AP PORTABLE CHEST  HISTORY: Short of air. Pneumonia.  COMPARISON: 4/16/2024  TECHNIQUE: AP portable chest x-ray.  FINDINGS: Port-A-Cath tubing is unchanged. Heart size is stable. There are some persistent infiltrates in the left lung, predominantly in the upper lobe. The upper lobe infiltrates are similar to prior. The lower lobe opacities are improved. There is worsening of a right pleural effusion and right lung consolidation. No pneumothorax.      Impression:  1. Worsening right pleural effusion and right lung consolidation. 2. Stable left upper lobe infiltrates with improved left basilar infiltrates  Electronically Signed By-Dr. Edison Guthrie MD On:4/29/2024 12:13 AM       ASSESSMENT/PLAN     Diagnoses and all orders for this visit:    1. Ulcer of foot, left, with unspecified severity (Primary)  -     Ambulatory Referral to Wound Clinic    2. Peripheral polyneuropathy  -      Ambulatory Referral to Wound Clinic    3. Type 2 diabetes mellitus with hyperglycemia, with long-term current use of insulin      Local wound care, daily dressing changes  Offload heels and foot at all times when in the bed in wheelchair  Patient at risk for infection and amputation due to comorbidities and history of ulcerations    Patient would like to follow-up closer to [place].  Wound care referral placed    Comprehensive lower extremity examination and evaluation was performed.    Discussed findings and treatment plan including risks, benefits, and treatment options with patient in detail. Patient agreed with treatment plan.    Medications and allergies reviewed.  Reviewed available lab values along with other pertinent labs.  These were discussed with the patient.    An After Visit Summary was printed and given to the patient at discharge, including (if requested) any available informative/educational handouts regarding diagnosis, treatment, or medications. All questions were answered to patient/family satisfaction. Should symptoms fail to improve or worsen they agree to call or return to clinic or to go to the Emergency Department. Discussed the importance of following up with any needed screening tests/labs/specialist appointments and any requested follow-up recommended by me today. Importance of maintaining follow-up discussed and patient accepts that missed appointments can delay diagnosis and potentially lead to worsening of conditions.    No follow-ups on file., or sooner if acute issues arise.    This document has been electronically signed by Jordon Fuentes DPM on May 20, 2024 13:59 EDT

## 2024-05-20 NOTE — PROGRESS NOTES
"Chief Complaint  Urinary Retention (6 mo. F/u pt. Has lopez cath in and wants to discuss taking it out. Pt says he has had it in 2-3wks)    Subjective          Preston Wallis presents to [facility] UROLOGY  History of Present Illness    58-year-old white male who is diabetic and has neurogenic bladder with no contraction of urinary bladder and has a Lopez catheter placed because he was on diuretics.  Patient's wife catheterizing 4 times a day.    PSA on 5/25/2023 was 0.203.    Patient has no pain in the suprapubic area or flanks.  No recent urinary tract infection.    Objective no acute distress  Vital Signs:   /54 (BP Location: Left arm, Patient Position: Sitting, Cuff Size: Adult)   Pulse 67   Ht 175.3 cm (69\")   Wt 119 kg (263 lb)   BMI 38.84 kg/m²     Allergies   Allergen Reactions    Benadryl [Diphenhydramine] Itching    Proventil [Albuterol] Other (See Comments)     Mouth sores        Past medical history:  has a past medical history of Age-related cognitive decline, Allergic contact dermatitis, Allergies, Anemia, Bedbound, Bronchiectasis with acute lower respiratory infection, Charcot foot due to diabetes mellitus (09/10/2013), Chronic diastolic (congestive) heart failure, Chronic kidney disease, Chronic respiratory failure with hypoxia, Closed supracondylar fracture of femur (01/12/2022), COPD (chronic obstructive pulmonary disease), Deep vein thrombosis (DVT) of lower extremity associated with air travel (01/13/2023), Dependence on supplemental oxygen, Eczema, Erectile dysfunction, Essential (primary) hypertension, Fracture, Fracture of proximal humerus (01/13/2023), GERD without esophagitis, High risk medication use, Hypercholesteremia, Hypomagnesemia, Infected stasis ulcer of left lower extremity (01/13/2023), Insomnia, Low back pain, Major depressive disorder, Morbid (severe) obesity due to excess calories, MRSA pneumonia, Muscle weakness, Non-pressure chronic ulcer of other " part of unspecified foot with bone involvement without evidence of necrosis, Obstructive sleep apnea (adult) (pediatric), On home O2, Other forms of dyspnea, Other long term (current) drug therapy, Other specified noninfective gastroenteritis and colitis, Other spondylosis, lumbar region, Pain in both knees, Paroxysmal atrial fibrillation, Peripheral neuropathy, Pneumonia, unspecified organism, Polyneuropathy, Rash and other nonspecific skin eruption, Syncope and collapse, Tachycardia, Tinnitus (01/13/2023), Type 1 diabetes mellitus with diabetic chronic kidney disease, Type 2 diabetes mellitus, Unspecified fall, initial encounter, and Urinary retention.   Past surgical history:  has a past surgical history that includes Cholecystectomy; tonsillectomy and adenoidectomy; Knee surgery (Left); Other surgical history (Left); Cystoscopy; Femur Surgery (Left); Tibial Plateau Open Reduction Internal Fixation (Left, 12/22/2023); and Portacath placement (Right).  Personal history: family history includes Asthma in his father; Cancer in his sister; Coronary artery disease in his mother; Diabetes type II in his mother and sister; Hypertension in his mother.  Social history:  reports that he quit smoking about 31 years ago. His smoking use included cigarettes. He started smoking about 43 years ago. He has a 12 pack-year smoking history. He has been exposed to tobacco smoke. He has never used smokeless tobacco. He reports that he does not currently use alcohol. He reports that he does not use drugs.    Review of Systems    No change from before    Physical Exam  Constitutional:       General: He is not in acute distress.     Appearance: Normal appearance. He is obese. He is not ill-appearing or toxic-appearing.      Comments: Patient is getting O2.    Has a Doyle catheter in place.    Patient is on a wheelchair   HENT:      Head: Normocephalic and atraumatic.      Ears:      Comments: No hearing loss  Pulmonary:      Comments:  Patient is O2 dependent  Abdominal:      Palpations: Abdomen is soft.      Tenderness: There is no abdominal tenderness. There is no right CVA tenderness or left CVA tenderness.   Genitourinary:     Penis: Normal.       Testes: Normal.      Comments: Has a Doyle catheter in place  Musculoskeletal:      Right lower leg: Edema present.      Left lower leg: Edema present.   Skin:     General: Skin is warm.      Coloration: Skin is not jaundiced.   Neurological:      General: No focal deficit present.      Mental Status: He is alert and oriented to person, place, and time.      Motor: Weakness present.      Gait: Gait abnormal.   Psychiatric:         Mood and Affect: Mood normal.         Behavior: Behavior normal.         Thought Content: Thought content normal.         Judgment: Judgment normal.        Result Review :                 Assessment and Plan    Diagnoses and all orders for this visit:    1. Urinary retention (Primary)  -     POC Urinalysis Dipstick, Automated    2. Neurogenic bladder, flaccid    3. Diabetes mellitus type 1 with complications    4. BPH with urinary obstruction    5. Doyle catheter in place    Since the wife catheterizes him 4 times a day I went ahead and removed Doyle catheter.  Reuteri do PSA on him in 7 days but the computer will not let me do it.  I will recheck him in 6 months time.  Patient PSA is due in 7 days.     Brief Urine Lab Results  (Last result in the past 365 days)        Color   Clarity   Blood   Leuk Est   Nitrite   Protein   CREAT   Urine HCG        05/20/24 1044 Yellow   Cloudy   Trace   Trace   Negative   100 mg/dL                    Follow Up   Return in about 6 months (around 11/20/2024).  Patient was given instructions and counseling regarding his condition or for health maintenance advice. Please see specific information pulled into the AVS if appropriate.     Teresita White MD

## 2024-05-21 LAB
MYCOBACTERIUM SPEC CULT: NORMAL
NIGHT BLUE STAIN TISS: NORMAL

## 2024-05-22 NOTE — OUTREACH NOTE
AMBULATORY CASE MANAGEMENT NOTE    Name and Relationship of Patient/Support Person: Kami Wallis G - Emergency Contact    Reached out to Elizabeth to be sure that she was aware of the specialist appointment upcoming.  Kami states that [facility] is aware and will help with transportation.  There is one in [place] that is not visible, but she will ride with him on the bus.    She reports that he is doing ok, still not weight bearing.      Education Documentation  No documentation found.        Tara GOMEZ  Ambulatory Case Management    1/4/2024, 14:27 EST  
home/No skilled OT needs

## 2024-05-27 LAB — FUNGUS WND CULT: NORMAL

## 2024-05-28 ENCOUNTER — PATIENT OUTREACH (OUTPATIENT)
Dept: CASE MANAGEMENT | Facility: OTHER | Age: 59
End: 2024-05-28
Payer: COMMERCIAL

## 2024-05-28 DIAGNOSIS — N18.31 STAGE 3A CHRONIC KIDNEY DISEASE: Primary | ICD-10-CM

## 2024-05-28 DIAGNOSIS — E11.65 TYPE 2 DIABETES MELLITUS WITH HYPERGLYCEMIA, WITH LONG-TERM CURRENT USE OF INSULIN: ICD-10-CM

## 2024-05-28 DIAGNOSIS — L97.421 SKIN ULCER OF LEFT HEEL, LIMITED TO BREAKDOWN OF SKIN: ICD-10-CM

## 2024-05-28 DIAGNOSIS — I50.32 CHRONIC DIASTOLIC (CONGESTIVE) HEART FAILURE: ICD-10-CM

## 2024-05-28 DIAGNOSIS — J96.01 ACUTE HYPOXIC ON CHRONIC HYPERCAPNIC RESPIRATORY FAILURE: ICD-10-CM

## 2024-05-28 DIAGNOSIS — J96.12 ACUTE HYPOXIC ON CHRONIC HYPERCAPNIC RESPIRATORY FAILURE: ICD-10-CM

## 2024-05-28 DIAGNOSIS — Z79.4 TYPE 2 DIABETES MELLITUS WITH HYPERGLYCEMIA, WITH LONG-TERM CURRENT USE OF INSULIN: ICD-10-CM

## 2024-05-28 LAB
MYCOBACTERIUM SPEC CULT: NORMAL
NIGHT BLUE STAIN TISS: NORMAL

## 2024-05-28 NOTE — OUTREACH NOTE
[facility] End of Month Documentation    This Chronic Medical Management Care Plan for Preston Wallis, 58 y.o. male, has been established; monitored and managed; reviewed and a new plan of care implemented for the month of May.  A cumulative time of 95  minutes was spent on this patient record this month, including phone call with care giver; electronic communication with primary care provider; electronic communication with pharmacist; chart review; phone call with patient.    Regarding the patient's problems: has Pneumonia due to COVID-19 virus; Polyneuropathy; Paroxysmal atrial fibrillation; Obstructive sleep apnea; MRSA pneumonia; Low back pain; Chronic diastolic heart failure; Allergies; COPD exacerbation; Chronic anticoagulation; Benign prostatic hyperplasia; Impaired mobility and endurance; Stage 3a chronic kidney disease; Iron deficiency anemia secondary to inadequate dietary iron intake; Vitamin D deficiency; Class 3 severe obesity with serious comorbidity in adult; Lower extremity edema; Elevated alkaline phosphatase level; Venous insufficiency (chronic) (peripheral); Tobacco abuse, in remission; History of Pseudomonas pneumonia; Chronic dyspnea; Gastroesophageal reflux disease; Bronchiectasis without complication; ERIC (acute kidney injury); Altered mental status; Hyperlipidemia; Luetscher's syndrome; Neurogenic bladder; Class 1 obesity; Pneumonia of both lungs due to Pseudomonas species; Seizures; Primary osteoarthritis of left knee; Other constipation; Chronic obstructive pulmonary disease; Type 2 DM with CKD stage 3 and hypertension; Essential hypertension; Stage 3b chronic kidney disease; Annual physical exam; Long-term use of high-risk medication; Personal history of PE (pulmonary embolism); Encounter for aftercare for healing closed traumatic fracture of left femur; Chronic pain of left knee; Primary osteoarthritis of right knee; Sepsis; Bronchiectasis with acute exacerbation; Bacterial pneumonia; Anemia;  Closed fracture of left tibial plateau; Septic joint; Septic arthritis; Skin ulcer of left heel, limited to breakdown of skin; Ulcer of left foot, limited to breakdown of skin; Left foot pain; Wheezing; Acute on chronic respiratory failure with hypercapnia; Chronic respiratory failure with hypoxia and hypercapnia; Acute hypoxic on chronic hypercapnic respiratory failure; and Impaired cognition on their problem list., the following items were addressed: medications; transitions to medical care; medical records; referrals to community service providers and any changes can be found within the plan section of the note.  A detailed listing of time spent for chronic care management is tracked within each outreach encounter.  Current medications include:  has a current medication list which includes the following prescription(s): apixaban, arformoterol, aspirin, atorvastatin, budesonide, bumetanide, buspirone, calcium citrate, carvedilol, vitamin d3, dapagliflozin, docusate sodium, duloxetine, exenatide er, famotidine, ferrous gluconate, finasteride, folic acid, levemir, insulin lispro, ipratropium-albuterol, ketoconazole, levetiracetam, magnesium oxide, montelukast, multivitamin with iron, nifedipine xl, o2, tobramycin pf, trazodone, and vitamin c. and the patient is reported to be caregiver will take responsibility for med compliance; patient is compliant with medication protocol,  Medications are reported to be non-effective in controlling symptoms and changes have been made to the medication protocol.  Regarding these diagnoses, referrals were made to the following provider(s):  specialists.  All notes on chart for PCP to review.    The patient was monitored remotely for pain; medications; blood glucose; mood & behavior.    The patient's physical needs include:  help taking medications as prescribed; medication education; needs assistance with ADLs; resources for disability needs; DME supplies.     The patient's mental  support needs include:  continued support    The patient's cognitive support needs include:  medication; continued support; needs assistance with ADLs; health care    The patient's psychosocial support needs include:  continued support; coordination of community providers; medication management or adherence    The patient's functional needs include: health care coverage; medication education; needs assistance for ADLs; physical healthcare; physician referral; resources for disability needs, Limited mobility.    The patient's environmental needs include:  resources for disability needs    Care Plan overall comments:  Still not at goal, however improving. will continue to follow. Has many upcoming appointments and referrals to specialists. Multiple hospitalizations./readmissions.    Refer to previous outreach notes for more information on the areas listed above.    Monthly Billing Diagnoses  (N18.31) Stage 3a chronic kidney disease    (J96.01,  J96.12) Acute hypoxic on chronic hypercapnic respiratory failure    (L97.421) Skin ulcer of left heel, limited to breakdown of skin    (I50.32) Chronic diastolic (congestive) heart failure    (E11.65,  Z79.4) Type 2 diabetes mellitus with hyperglycemia, with long-term current use of insulin    Medications   Medications have been reconciled    Care Plan progress this month:      Recently Modified Care Plans Updates made since 4/27/2024 12:00 AM      No recently modified care plans.            Current Specialty Plan of Care Status signed by both patient and provider    Instructions   Patient was provided an electronic copy of care plan  CCM services were explained and offered and patient has accepted these services.  Patient has given their written consent to receive CCM services and understands that this includes the authorization of electronic communication of medical information with the other treating providers.  Patient understands that they may stop CCM services at any time  and these changes will be effective at the end of the calendar month and will effectively revocate the agreement of CCM services.  Patient understands that only one practitioner can furnish and be paid for CCM services during one calendar month.  Patient also understands that there may be co-payment or deductible fees in association with CCM services.  Patient will continue with at least monthly follow-up calls with the Ambulatory .    Tara GOMEZ  Ambulatory Case Management    5/28/2024, 15:55 EDT

## 2024-05-29 ENCOUNTER — PATIENT OUTREACH (OUTPATIENT)
Dept: CASE MANAGEMENT | Facility: OTHER | Age: 59
End: 2024-05-29
Payer: COMMERCIAL

## 2024-05-29 DIAGNOSIS — N18.31 STAGE 3A CHRONIC KIDNEY DISEASE: Primary | ICD-10-CM

## 2024-05-29 NOTE — OUTREACH NOTE
AMBULATORY CASE MANAGEMENT NOTE    Names and Relationships of Patient/Support Persons: Caller: Roopa Ben; Relationship:  -     Roopa, nurse [facility] called to report that Gómez's legs are 3+ right, 4+ left pitting edema.    She states that his legs have not been wrapped since last week, unknown why.  Advised since he cannot wear RACHEL hose, he must keep his legs wrapped daily and may take off at night and elevate as much as possible.  She and Elizabeth understood.   He is taking 2mg Bumex twice daily.  He had CKD, CHF.  Advised Roopa if not improved with wrap may need to reach out to cardiology for guidance with diuretic increase.      Tara GOMEZ  Ambulatory Case Management    5/29/2024, 16:38 EDT

## 2024-06-03 ENCOUNTER — LAB (OUTPATIENT)
Dept: LAB | Facility: HOSPITAL | Age: 59
End: 2024-06-03
Payer: COMMERCIAL

## 2024-06-03 ENCOUNTER — PATIENT OUTREACH (OUTPATIENT)
Dept: CASE MANAGEMENT | Facility: OTHER | Age: 59
End: 2024-06-03
Payer: COMMERCIAL

## 2024-06-03 DIAGNOSIS — R04.2 HEMOPTYSIS: Primary | ICD-10-CM

## 2024-06-03 DIAGNOSIS — R04.2 HEMOPTYSIS: ICD-10-CM

## 2024-06-03 DIAGNOSIS — J96.22 ACUTE ON CHRONIC RESPIRATORY FAILURE WITH HYPERCAPNIA: Primary | ICD-10-CM

## 2024-06-03 PROCEDURE — 87070 CULTURE OTHR SPECIMN AEROBIC: CPT

## 2024-06-03 PROCEDURE — 87205 SMEAR GRAM STAIN: CPT

## 2024-06-03 NOTE — Clinical Note
Kami called to state that he began coughing up blood last night and brought me in a sputum she collected at home this am - will one of you sign this order so I can take to the lab to process.

## 2024-06-04 ENCOUNTER — PRIOR AUTHORIZATION (OUTPATIENT)
Dept: FAMILY MEDICINE CLINIC | Age: 59
End: 2024-06-04
Payer: COMMERCIAL

## 2024-06-04 NOTE — OUTREACH NOTE
AMBULATORY CASE MANAGEMENT NOTE    Names and Relationships of Patient/Support Persons: Caller: Kami Wallis; Relationship: Emergency Contact -     Kami came in and we went over Gómez meds.  He was due for refills on all meds.  Called [facility] and spoke with Harinder, he will get ready today.  Medications all accurate, no changes.    Elizabeth also requests a sputum culture, he is coughing up bloody sputum.  Order pended for PCP to sign.  Outreach created for follow up on sputum culture.      Tara GOMEZ  Ambulatory Case Management

## 2024-06-05 ENCOUNTER — PATIENT OUTREACH (OUTPATIENT)
Dept: CASE MANAGEMENT | Facility: OTHER | Age: 59
End: 2024-06-05
Payer: COMMERCIAL

## 2024-06-05 DIAGNOSIS — J96.22 ACUTE ON CHRONIC RESPIRATORY FAILURE WITH HYPERCAPNIA: Primary | ICD-10-CM

## 2024-06-05 LAB
BACTERIA SPEC RESP CULT: NORMAL
GRAM STN SPEC: NORMAL
GRAM STN SPEC: NORMAL

## 2024-06-05 NOTE — OUTREACH NOTE
AMBULATORY CASE MANAGEMENT NOTE    Names and Relationships of Patient/Support Persons: Caller: [facility]; Relationship:   Caller: Gwendolyn Blas; Relationship:  -     Roopa called to ask about supplies for monthly port flushed.     Called [facility] and they did discharge services in December.  [place] they have a contract with [facility].  Spoke with Ermelinda salazar and she took a verbal order to continue 1 year (expires in 6/2025).       Tara GOMEZ  Ambulatory Case Management    6/5/2024, 15:45 EDT

## 2024-06-10 ENCOUNTER — OFFICE VISIT (OUTPATIENT)
Dept: WOUND CARE | Facility: HOSPITAL | Age: 59
End: 2024-06-10
Payer: COMMERCIAL

## 2024-06-10 VITALS
SYSTOLIC BLOOD PRESSURE: 130 MMHG | HEART RATE: 72 BPM | RESPIRATION RATE: 18 BRPM | DIASTOLIC BLOOD PRESSURE: 60 MMHG | TEMPERATURE: 97.8 F

## 2024-06-10 DIAGNOSIS — G47.33 OBSTRUCTIVE SLEEP APNEA: ICD-10-CM

## 2024-06-10 DIAGNOSIS — L97.509 TYPE 2 DIABETES MELLITUS WITH FOOT ULCER, WITH LONG-TERM CURRENT USE OF INSULIN: ICD-10-CM

## 2024-06-10 DIAGNOSIS — J44.9 CHRONIC OBSTRUCTIVE PULMONARY DISEASE, UNSPECIFIED COPD TYPE: ICD-10-CM

## 2024-06-10 DIAGNOSIS — J96.11 CHRONIC RESPIRATORY FAILURE WITH HYPOXIA, ON HOME O2 THERAPY: ICD-10-CM

## 2024-06-10 DIAGNOSIS — Z79.4 TYPE 2 DIABETES MELLITUS WITH FOOT ULCER, WITH LONG-TERM CURRENT USE OF INSULIN: ICD-10-CM

## 2024-06-10 DIAGNOSIS — Z99.81 CHRONIC RESPIRATORY FAILURE WITH HYPOXIA, ON HOME O2 THERAPY: ICD-10-CM

## 2024-06-10 DIAGNOSIS — I50.32 CHRONIC DIASTOLIC HEART FAILURE: ICD-10-CM

## 2024-06-10 DIAGNOSIS — E11.621 TYPE 2 DIABETES MELLITUS WITH FOOT ULCER, WITH LONG-TERM CURRENT USE OF INSULIN: ICD-10-CM

## 2024-06-10 DIAGNOSIS — L89.620 PRESSURE INJURY OF LEFT HEEL, UNSTAGEABLE: Primary | ICD-10-CM

## 2024-06-10 DIAGNOSIS — R60.0 LOWER EXTREMITY EDEMA: Chronic | ICD-10-CM

## 2024-06-10 DIAGNOSIS — E66.01 CLASS 2 SEVERE OBESITY WITH SERIOUS COMORBIDITY AND BODY MASS INDEX (BMI) OF 38.0 TO 38.9 IN ADULT, UNSPECIFIED OBESITY TYPE: ICD-10-CM

## 2024-06-10 DIAGNOSIS — I73.9 PERIPHERAL ARTERIAL DISEASE: ICD-10-CM

## 2024-06-10 DIAGNOSIS — L89.890 PRESSURE INJURY OF LEFT FOOT, UNSTAGEABLE: ICD-10-CM

## 2024-06-10 LAB
MYCOBACTERIUM SPEC CULT: NORMAL
NIGHT BLUE STAIN TISS: NORMAL

## 2024-06-10 PROCEDURE — 3075F SYST BP GE 130 - 139MM HG: CPT | Performed by: EMERGENCY MEDICINE

## 2024-06-10 PROCEDURE — 1159F MED LIST DOCD IN RCRD: CPT | Performed by: EMERGENCY MEDICINE

## 2024-06-10 PROCEDURE — 99204 OFFICE O/P NEW MOD 45 MIN: CPT | Performed by: EMERGENCY MEDICINE

## 2024-06-10 PROCEDURE — 11042 DBRDMT SUBQ TIS 1ST 20SQCM/<: CPT | Performed by: EMERGENCY MEDICINE

## 2024-06-10 PROCEDURE — 1160F RVW MEDS BY RX/DR IN RCRD: CPT | Performed by: EMERGENCY MEDICINE

## 2024-06-10 PROCEDURE — 3078F DIAST BP <80 MM HG: CPT | Performed by: EMERGENCY MEDICINE

## 2024-06-10 NOTE — PROGRESS NOTES
Chief Complaint  Wound Check (NEW PATIENT: PATIENT HAS TWO LEFT FOOT ULCERS, THAT HE NOTICED AROUND [facility]. HE WAS IN THE HOSPITAL FOR PNEUMONIA AND THEN FELL AT THE HOSPITAL AND HAD LEFT KNEE SURGERY. HE WENT TO [facility], [place] AND NOTICED FOOT WOUNDS AT THAT TIME. THEY HAVE BEEN APPLYING IODINE SOAKED GAUZE AND KEEPING IT COVERED. [facility] COMES OUT TO SEE HIM WEEKLY. (BS: 117))    Subjective      History of Present Illness    Preston Wallis  is a 58 y.o. male with multiple medical problems including COPD and chronic respiratory failure on oxygen, chronic anemia, chronic diastolic CHF, A-fib, and type 2 diabetes.  Patient was referred here by his podiatrist for evaluation of wounds on his left foot.  Patient reports that these have been there since about December 2023.  He has had repeated hospitalizations, primarily for respiratory issues, and he and his wife believe they started due to extended illness but are not completely sure.  Currently they are applying Betadine soaked gauze to the wounds on his left heel and left fifth MPJ.  He received a diabetic shoe with peg offloading insert and the area around the MPJ wound was cut out but no cut out has been made in the heel.  He says he was told that he could walk on it is much as he wants as long as he is wearing his shoe.  Patient reports that he mostly stays in bed and his wife helps him a great deal.      Allergies:  Benadryl [diphenhydramine] and Proventil [albuterol]      Current Outpatient Medications:     apixaban (Eliquis) 5 MG tablet tablet, Take 1 tablet by mouth Every 12 (Twelve) Hours., Disp: 60 tablet, Rfl: 2    arformoterol (BROVANA) 15 MCG/2ML nebulizer solution, Take 2 mL by nebulization 2 (Two) Times a Day., Disp: 360 mL, Rfl: 3    aspirin 81 MG EC tablet, Take 1 tablet by mouth Every Morning., Disp: 30 tablet, Rfl: 2    atorvastatin (LIPITOR) 20 MG tablet, Take 1 tablet by mouth Every Night., Disp: 30 tablet, Rfl: 2    budesonide  (Pulmicort) 0.5 MG/2ML nebulizer solution, Take 2 mL by nebulization 2 (Two) Times a Day., Disp: 360 mL, Rfl: 3    bumetanide (BUMEX) 2 MG tablet, Take 1 tablet by mouth 2 (Two) Times a Day., Disp: 60 tablet, Rfl: 2    busPIRone (BUSPAR) 15 MG tablet, Take 1 tablet by mouth 2 (Two) Times a Day., Disp: 60 tablet, Rfl: 2    calcium citrate (CALCITRATE) 950 (200 Ca) MG tablet, Take 1 tablet by mouth Every Night., Disp: 30 tablet, Rfl: 2    carvedilol (COREG) 25 MG tablet, Take 1 tablet by mouth Every 12 (Twelve) Hours., Disp: 60 tablet, Rfl: 2    Cholecalciferol (Vitamin D3) 50 MCG (2000 UT) tablet, Take 1 tablet by mouth Every Morning., Disp: 30 tablet, Rfl: 2    dapagliflozin (Farxiga) 5 MG tablet tablet, Take 1 tablet by mouth Every Morning., Disp: 30 tablet, Rfl: 2    docusate sodium (COLACE) 100 MG capsule, Take 1 capsule by mouth Every Night., Disp: 30 capsule, Rfl: 2    DULoxetine (CYMBALTA) 30 MG capsule, Take 1 capsule by mouth Every Morning., Disp: 30 capsule, Rfl: 2    exenatide er (BYDUREON) 2 MG/0.85ML auto-injector injection, Inject 0.85 mL under the skin into the appropriate area as directed 1 (One) Time Per Week., Disp: , Rfl:     famotidine (PEPCID) 40 MG tablet, Take 1 tablet by mouth Every Morning., Disp: 30 tablet, Rfl: 2    ferrous gluconate (FERGON) 324 MG tablet, Take 1 tablet by mouth Every Morning., Disp: 30 tablet, Rfl: 2    finasteride (PROSCAR) 5 MG tablet, Take 1 tablet by mouth Every Night., Disp: 30 tablet, Rfl: 2    folic acid (FOLVITE) 1 MG tablet, Take 1 tablet by mouth Every Night., Disp: 30 tablet, Rfl: 2    insulin detemir (Levemir) 100 UNIT/ML injection, Inject 10 Units under the skin into the appropriate area as directed Every Night., Disp: 15 mL, Rfl: 2    Insulin Lispro (humaLOG) 100 UNIT/ML injection, Inject 8 Units under the skin into the appropriate area as directed 3 (Three) Times a Day Before Meals. Per sliding scale, Disp: 15 mL, Rfl: 0    ipratropium-albuterol (DUO-NEB)  "0.5-2.5 mg/3 ml nebulizer, Take 3 mL by nebulization Every 4 (Four) Hours As Needed for Wheezing or Shortness of Air., Disp: 360 mL, Rfl: 5    Ketoconazole 1 % shampoo, Apply 10 mL topically Every 3 (Three) Days., Disp: 200 mL, Rfl: 0    levETIRAcetam (KEPPRA) 500 MG tablet, Take 1 tablet by mouth 2 (Two) Times a Day., Disp: 60 tablet, Rfl: 2    magnesium oxide (MAG-OX) 400 tablet tablet, Take 1 tablet by mouth Every Night., Disp: 30 tablet, Rfl: 2    montelukast (SINGULAIR) 10 MG tablet, Take 1 tablet by mouth Every Night., Disp: 30 tablet, Rfl: 2    Multiple Vitamins-Iron (multivitamin with iron) tablet tablet, Take 1 tablet by mouth Every Morning., Disp: 30 tablet, Rfl: 2    NIFEdipine XL (PROCARDIA XL) 30 MG 24 hr tablet, Take 1 tablet by mouth Every Morning., Disp: 30 tablet, Rfl: 2    O2 (OXYGEN), 2 Liter O2 - CONTINUOUS (route: Oxygen), Disp: , Rfl:     traZODone (DESYREL) 50 MG tablet, Take 1 tablet by mouth Every Night., Disp: 30 tablet, Rfl: 2    vitamin C (ASCORBIC ACID) 500 MG tablet, Take 1 tablet by mouth 2 (Two) Times a Day., Disp: 60 tablet, Rfl: 2    Past Medical History:   Diagnosis Date    Age-related cognitive decline     Allergic contact dermatitis     Allergies     Anemia     Bedbound     11/2023 \"MY LEG MUSCLES STOPPED WORKING\"    Bronchiectasis with acute lower respiratory infection     Charcot foot due to diabetes mellitus 09/10/2013    Chronic diastolic (congestive) heart failure     Chronic kidney disease     Chronic respiratory failure with hypoxia     Closed supracondylar fracture of femur 01/12/2022    COPD (chronic obstructive pulmonary disease)     Deep vein thrombosis (DVT) of lower extremity associated with air travel 01/13/2023    Dependence on supplemental oxygen     Eczema     Erectile dysfunction     due to organic reasons    Essential (primary) hypertension     Fracture     closed fracture of other tarsal and metatarsal bones    Fracture of proximal humerus 01/13/2023    GERD " without esophagitis     High risk medication use     Hypercholesteremia     Hypomagnesemia     Infected stasis ulcer of left lower extremity 2023    Insomnia     Low back pain     Major depressive disorder     Morbid (severe) obesity due to excess calories     MRSA pneumonia     Muscle weakness     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 without evidence of necrosis     Obstructive sleep apnea (adult) (pediatric)     On home O2     REPORTS WEARING 2L/NC AAT    Other forms of dyspnea     Other long term (current) drug therapy     Other specified noninfective gastroenteritis and colitis     Other spondylosis, lumbar region     Pain in both knees     Paroxysmal atrial fibrillation     Peripheral neuropathy     attributed to type 2 diabetes    Pneumonia, unspecified organism     Polyneuropathy     Rash and other nonspecific skin eruption     Syncope and collapse     Tachycardia     Tinnitus 2023    Type 1 diabetes mellitus with diabetic chronic kidney disease     Type 2 diabetes mellitus     Unspecified fall, initial encounter     Urinary retention      Past Surgical History:   Procedure Laterality Date    CHOLECYSTECTOMY      CYSTOSCOPY      FEMUR SURGERY Left     Shravan placed    KNEE SURGERY Left     OTHER SURGICAL HISTORY Left     venous port, REMOVED    PORTACATH PLACEMENT Right     TIBIAL PLATEAU OPEN REDUCTION INTERNAL FIXATION Left 2023    Procedure: TIBIAL PLATEAU OPEN REDUCTION INTERNAL FIXATION;  Surgeon: Hugo Kline MD;  Location: [facility];  Service: Orthopedics;  Laterality: Left;    TONSILLECTOMY AND ADENOIDECTOMY       Social History     Socioeconomic History    Marital status:    Tobacco Use    Smoking status: Former     Current packs/day: 0.00     Average packs/day: 1 pack/day for 12.0 years (12.0 ttl pk-yrs)     Types: Cigarettes     Start date:      Quit date:      Years since quittin.4     Passive  exposure: Past    Smokeless tobacco: Never   Vaping Use    Vaping status: Never Used   Substance and Sexual Activity    Alcohol use: Not Currently    Drug use: Never    Sexual activity: Defer           Objective     Vitals:    06/10/24 1051   BP: 130/60   BP Location: Right arm   Patient Position: Sitting   Pulse: 72   Resp: 18  Comment: O2: 92% ON 2L/NC   Temp: 97.8 °F (36.6 °C)   TempSrc: Temporal   PainSc:   3   PainLoc: Foot  Comment: BILATERAL FOOT PAIN     There is no height or weight on file to calculate BMI.    STEADI Fall Risk Assessment has not been completed.     Review of Systems     ROS:  Per HPI.     I have reviewed the HPI and ROS as documented by MA/RN. Katerin Torres MD    Physical Exam     NAD, chronically ill-appearing, on oxygen  AAOx3, pleasant, cooperative  Mild edema BLE left greater than right  Extensive Charcot foot deformity RLE  Palpable pedal pulses, faint due to edema  LLE: Large black eschar left plantar medial heel.  Eschar itself is dry but the underlying tissues feel somewhat soft.  No drainage has not or expressed, no erythema or heat.  Large black eschar left plantar fifth MPJ soft and elevating at the edges.  Underlying tissue destruction noted.  No surrounding erythema or warmth, no drainage present or expressed.  No tenderness of either wound due to severe DM neuropathy.  No evidence of acute infection.    See photos for details.    LLE:              Result Review :  The following data was reviewed by: Katerin Torres MD on 06/10/2024:    Prior wound images.  Discharge summary, podiatry note, hemoglobin A1c 7.3, BMP, CBC, arterial Doppler    Wound debridement Pressure Injury    Performed by: Katerin Torres MD  Authorized by: Katerin Torres MD  Associated wounds:   Wound 02/16/24 1700 Left posterior foot Pressure Injury  Correct patient: Identification verified by two methods:     Verbally and Date of birth  Correct procedure/consent    Correct site/side    Correct equipment  as applicable    How many wounds are you performing a debridement on?:  1  Wound 1:     Nursing Documentation:     Wound Location:  Plantar fifth MPJ  Measurements:     The total amount debrided on this wound was under 20 cm2.     Provider Documentation    Debridement type:  Sharp/excisional    Betadine      Other:  Sterile 10 blade     None    Tissue debrided:  Moderate    Type tissue:  Eschar and Slough    Level removed:  Subcutaneous    Patient tolerance:  Good    Hemostasis achieved by:  Silver nitrate    Wound Bed Post Debridement: See Photo              Assessment and Plan   Diagnoses and all orders for this visit:    1. Pressure injury of left heel, unstageable (Primary)    2. Pressure injury of left foot, unstageable  -     Wound debridement Pressure Injury    3. Lower extremity edema    4. Type 2 diabetes mellitus with foot ulcer, with long-term current use of insulin    5. Chronic diastolic heart failure    6. Obstructive sleep apnea    7. Chronic obstructive pulmonary disease, unspecified COPD type    8. Chronic respiratory failure with hypoxia, on home O2 therapy    9. Class 2 severe obesity with serious comorbidity and body mass index (BMI) of 38.0 to 38.9 in adult, unspecified obesity type    10. Peripheral arterial disease  -     Ambulatory Referral to Vascular Surgery        Patient with severe wounds that appear to be related to pressure and complicated by his severe neuropathy and diabetes in addition to his pulmonary disease, heart disease, kidneys disease, etc.    Recommend they continue using Betadine soaked gauze to the wounds.  This should be done 1-2 times daily and wound should be kept covered at all times.    We had a long discussion about the importance of offloading the wounds at all times and avoiding direct pressure on these areas.  They report that they believe he has a pressure offloading cushion which boot to use in bed that he received from the hospital but he has not been using it.   They will look for that when he gets home and start using it again to be sure there is no pressure on his heel.  He has also been trying to prop it up on pillows.  Patient has a wheelchair and will try to use that more rather than walking on his foot as much.    Patient is also advised to improve diabetic control and to eat a nutritious diet low in carbohydrates and with increased protein to aid healing.    I reviewed the patient's arterial study with Dr. Willson who felt that the patient's waveforms looked fairly good but that he may benefit from vascular evaluation and further testing.  Referral to vascular surgery placed.    Recheck 5 weeks, sooner if symptoms change or worsen.    Patient was given instructions and counseling regarding their condition or for health maintenance advice, as well as the wound care plan and recommendations. They understand and agree with the plan.  They will follow back up here in the clinic but are instructed to contact us in the interim should they have any new, returning, or worsening symptoms or concerns. Please see specific information pulled into the AVS if appropriate.     Dragon Dictation utilized for chart completion.    Follow Up   Return in about 5 weeks (around 7/15/2024).      Katerin Torres MD

## 2024-06-14 ENCOUNTER — OFFICE VISIT (OUTPATIENT)
Dept: VASCULAR SURGERY | Facility: HOSPITAL | Age: 59
End: 2024-06-14
Payer: COMMERCIAL

## 2024-06-14 VITALS
OXYGEN SATURATION: 82 % | SYSTOLIC BLOOD PRESSURE: 122 MMHG | RESPIRATION RATE: 18 BRPM | DIASTOLIC BLOOD PRESSURE: 56 MMHG | TEMPERATURE: 97.2 F | HEART RATE: 76 BPM

## 2024-06-14 DIAGNOSIS — I70.25 ATHEROSCLEROSIS OF NATIVE ARTERIES OF THE EXTREMITIES WITH ULCERATION: Primary | ICD-10-CM

## 2024-06-14 PROCEDURE — G0463 HOSPITAL OUTPT CLINIC VISIT: HCPCS

## 2024-06-14 PROCEDURE — G0463 HOSPITAL OUTPT CLINIC VISIT: HCPCS | Performed by: NURSE PRACTITIONER

## 2024-06-14 NOTE — PROGRESS NOTES
[facility] Vascular Surgery New Patient Office Note     Date of Encounter: 06/14/2024     MRN Number: [id number]  Name: Preston Wallis  Phone Number:      Referred By: Katerin Torres MD  PCP: Kimmy Riley MD    Chief Complaint:    Chief Complaint   Patient presents with    Leg Pain     Patient is here as a referral from wound care for possible peripheral artery disease. Patient has non healing wounds to the left lower extremity.        Subjective      History of Present Illness:    Preston Wallis is a 58 y.o. male presents as a referral from Dr. Torres for nonhealing wounds.  He had an arterial Doppler that revealed noncompressible vessels so ABIs were unable to be assessed.  He was in a rehab facility for a broken left leg and developed pressure injuries on his heel and on the lateral plantar surface which was in January 2024. He has been offloading and applying Betadine twice daily.  No other complaints at this time    Review of Systems:  ROS  Review of Systems   Constitutional: Negative.   HENT: Negative.    Cardiovascular: Negative.    Respiratory: Negative.    Skin: Negative.    Musculoskeletal: Negative.    Gastrointestinal: Negative.    Neurological: Negative.    Psychiatric/Behavioral: Negative.     I have reviewed the following portions of the patient's history: problem list, current medications, allergies, past surgical history, past medical history, past social history, past family history, and ROS and confirm it's accurate.    Allergies:  Allergies   Allergen Reactions    Benadryl [Diphenhydramine] Itching    Proventil [Albuterol] Other (See Comments)     Mouth sores         Medications:      Current Outpatient Medications:     apixaban (Eliquis) 5 MG tablet tablet, Take 1 tablet by mouth Every 12 (Twelve) Hours., Disp: 60 tablet, Rfl: 2    arformoterol (BROVANA) 15 MCG/2ML nebulizer solution, Take 2 mL by nebulization 2 (Two) Times a Day., Disp: 360 mL, Rfl: 3    aspirin 81 MG EC  tablet, Take 1 tablet by mouth Every Morning., Disp: 30 tablet, Rfl: 2    atorvastatin (LIPITOR) 20 MG tablet, Take 1 tablet by mouth Every Night., Disp: 30 tablet, Rfl: 2    budesonide (Pulmicort) 0.5 MG/2ML nebulizer solution, Take 2 mL by nebulization 2 (Two) Times a Day., Disp: 360 mL, Rfl: 3    bumetanide (BUMEX) 2 MG tablet, Take 1 tablet by mouth 2 (Two) Times a Day., Disp: 60 tablet, Rfl: 2    busPIRone (BUSPAR) 15 MG tablet, Take 1 tablet by mouth 2 (Two) Times a Day., Disp: 60 tablet, Rfl: 2    calcium citrate (CALCITRATE) 950 (200 Ca) MG tablet, Take 1 tablet by mouth Every Night., Disp: 30 tablet, Rfl: 2    carvedilol (COREG) 25 MG tablet, Take 1 tablet by mouth Every 12 (Twelve) Hours., Disp: 60 tablet, Rfl: 2    Cholecalciferol (Vitamin D3) 50 MCG (2000 UT) tablet, Take 1 tablet by mouth Every Morning., Disp: 30 tablet, Rfl: 2    dapagliflozin (Farxiga) 5 MG tablet tablet, Take 1 tablet by mouth Every Morning., Disp: 30 tablet, Rfl: 2    docusate sodium (COLACE) 100 MG capsule, Take 1 capsule by mouth Every Night., Disp: 30 capsule, Rfl: 2    DULoxetine (CYMBALTA) 30 MG capsule, Take 1 capsule by mouth Every Morning., Disp: 30 capsule, Rfl: 2    exenatide er (BYDUREON) 2 MG/0.85ML auto-injector injection, Inject 0.85 mL under the skin into the appropriate area as directed 1 (One) Time Per Week., Disp: , Rfl:     famotidine (PEPCID) 40 MG tablet, Take 1 tablet by mouth Every Morning., Disp: 30 tablet, Rfl: 2    ferrous gluconate (FERGON) 324 MG tablet, Take 1 tablet by mouth Every Morning., Disp: 30 tablet, Rfl: 2    finasteride (PROSCAR) 5 MG tablet, Take 1 tablet by mouth Every Night., Disp: 30 tablet, Rfl: 2    folic acid (FOLVITE) 1 MG tablet, Take 1 tablet by mouth Every Night., Disp: 30 tablet, Rfl: 2    insulin detemir (Levemir) 100 UNIT/ML injection, Inject 10 Units under the skin into the appropriate area as directed Every Night., Disp: 15 mL, Rfl: 2    Insulin Lispro (humaLOG) 100 UNIT/ML  injection, Inject 8 Units under the skin into the appropriate area as directed 3 (Three) Times a Day Before Meals. Per sliding scale, Disp: 15 mL, Rfl: 0    ipratropium-albuterol (DUO-NEB) 0.5-2.5 mg/3 ml nebulizer, Take 3 mL by nebulization Every 4 (Four) Hours As Needed for Wheezing or Shortness of Air., Disp: 360 mL, Rfl: 5    Ketoconazole 1 % shampoo, Apply 10 mL topically Every 3 (Three) Days., Disp: 200 mL, Rfl: 0    levETIRAcetam (KEPPRA) 500 MG tablet, Take 1 tablet by mouth 2 (Two) Times a Day., Disp: 60 tablet, Rfl: 2    magnesium oxide (MAG-OX) 400 tablet tablet, Take 1 tablet by mouth Every Night., Disp: 30 tablet, Rfl: 2    montelukast (SINGULAIR) 10 MG tablet, Take 1 tablet by mouth Every Night., Disp: 30 tablet, Rfl: 2    Multiple Vitamins-Iron (multivitamin with iron) tablet tablet, Take 1 tablet by mouth Every Morning., Disp: 30 tablet, Rfl: 2    NIFEdipine XL (PROCARDIA XL) 30 MG 24 hr tablet, Take 1 tablet by mouth Every Morning., Disp: 30 tablet, Rfl: 2    O2 (OXYGEN), 2 Liter O2 - CONTINUOUS (route: Oxygen), Disp: , Rfl:     traZODone (DESYREL) 50 MG tablet, Take 1 tablet by mouth Every Night., Disp: 30 tablet, Rfl: 2    vitamin C (ASCORBIC ACID) 500 MG tablet, Take 1 tablet by mouth 2 (Two) Times a Day., Disp: 60 tablet, Rfl: 2      Objective     Physical Exam:  Vitals:    06/14/24 1107   BP: 122/56   BP Location: Left arm   Patient Position: Sitting   Cuff Size: Large Adult   Pulse: 76   Resp: 18   Temp: 97.2 °F (36.2 °C)   TempSrc: Temporal   SpO2: (!) 82%   PainSc:   4   PainLoc: Leg      There is no height or weight on file to calculate BMI.    Physical Exam   Physical Exam  Constitutional:       Appearance:.  Alert, no acute distress  HENT:      Head: Normocephalic and atraumatic.   Eyes:      Pupils: Pupils are equal, round, and reactive to light.   Neck:      Comments:   Cardiovascular:      Rate and Rhythm: Normal rate and regular rhythm.  Palpable pedal pulses bilaterally.  Pulmonary:       Effort: Pulmonary effort is normal, wearing portable oxygen via nasal canula.   Musculoskeletal:      Cervical back: Normal range of motion and neck supple.   Skin:     General: Skin is warm and dry.   Neurological:      General: No focal deficit present.      Mental Status: He is alert and oriented to person, place, and time.     Imaging/Labs:  I have reviewed the results of the arterial Doppler performed on 16 2024.  The ABIs were unobtainable because of noncompressible vessels.  No radiology results for the last 30 days.       Assessment / Plan      Assessment / Plan:  Diagnoses and all orders for this visit:    1. Atherosclerosis of native arteries of the extremities with ulceration (Primary)  -     Duplex Lower Extremity Art / Grafts - Left CAR; Future    Mr. Wallis has nonhealing left lower extremity wounds, his ABIs were unobtainable due to noncompressible vessels.  I recommend that we obtain a left lower extremity duplex and follow-up with Dr. Willson.    I have answered all of his questions and he is in agreement with the plan at this time.  Thank you for allowing me to participate in your patient's care.    Patient Education: Continue wound care as prescribed      Follow Up:   No follow-ups on file.   Or sooner for any further concerns or worsening sign and symptoms. If unable to reach us in the office please dial 911 or go to the nearest emergency department.      CON Azevedo  [facility]

## 2024-06-24 ENCOUNTER — OFFICE VISIT (OUTPATIENT)
Dept: PODIATRY | Facility: CLINIC | Age: 59
End: 2024-06-24
Payer: COMMERCIAL

## 2024-06-24 VITALS
SYSTOLIC BLOOD PRESSURE: 124 MMHG | TEMPERATURE: 96.9 F | DIASTOLIC BLOOD PRESSURE: 48 MMHG | HEART RATE: 68 BPM | OXYGEN SATURATION: 88 % | HEIGHT: 69 IN | BODY MASS INDEX: 38.82 KG/M2

## 2024-06-24 DIAGNOSIS — E11.65 TYPE 2 DIABETES MELLITUS WITH HYPERGLYCEMIA, WITH LONG-TERM CURRENT USE OF INSULIN: ICD-10-CM

## 2024-06-24 DIAGNOSIS — Z79.4 TYPE 2 DIABETES MELLITUS WITH HYPERGLYCEMIA, WITH LONG-TERM CURRENT USE OF INSULIN: ICD-10-CM

## 2024-06-24 DIAGNOSIS — G62.9 PERIPHERAL POLYNEUROPATHY: Primary | ICD-10-CM

## 2024-06-24 DIAGNOSIS — L97.529 ULCER OF FOOT, LEFT, WITH UNSPECIFIED SEVERITY: ICD-10-CM

## 2024-06-24 PROCEDURE — 99213 OFFICE O/P EST LOW 20 MIN: CPT | Performed by: PODIATRIST

## 2024-06-24 PROCEDURE — 1160F RVW MEDS BY RX/DR IN RCRD: CPT | Performed by: PODIATRIST

## 2024-06-24 PROCEDURE — 3074F SYST BP LT 130 MM HG: CPT | Performed by: PODIATRIST

## 2024-06-24 PROCEDURE — 1159F MED LIST DOCD IN RCRD: CPT | Performed by: PODIATRIST

## 2024-06-24 PROCEDURE — 3078F DIAST BP <80 MM HG: CPT | Performed by: PODIATRIST

## 2024-06-24 NOTE — PROGRESS NOTES
"    [facility] - PODIATRY    Today's Date: 06/24/24    Patient Name: Preston Wallis  MRN: [id number]  CSN: [id number]  PCP: Kimmy Riley MD,   Referring Provider: No ref. provider found    SUBJECTIVE     Chief Complaint   Patient presents with    Left Foot - Foot Ulcer, Follow-up     HPI: Preston Wallis, a 59 y.o.male, presents to clinic.    Patient is a 58-year-old male presenting with wounds on his left foot and heel.  Patient is diabetic and has history of Charcot arthropathy.  Patient was recently admitted due to multiple medical issues.  Patient states that he has had wounds on his feet for several months.  Patient says that he he does very minimal walking.  Past Medical History:   Diagnosis Date    Age-related cognitive decline     Allergic contact dermatitis     Allergies     Anemia     Bedbound     11/2023 \"MY LEG MUSCLES STOPPED WORKING\"    Bronchiectasis with acute lower respiratory infection     Charcot foot due to diabetes mellitus 09/10/2013    Chronic diastolic (congestive) heart failure     Chronic kidney disease     Chronic respiratory failure with hypoxia     Closed supracondylar fracture of femur 01/12/2022    COPD (chronic obstructive pulmonary disease)     Deep vein thrombosis (DVT) of lower extremity associated with air travel 01/13/2023    Dependence on supplemental oxygen     Eczema     Erectile dysfunction     due to organic reasons    Essential (primary) hypertension     Fracture     closed fracture of other tarsal and metatarsal bones    Fracture of proximal humerus 01/13/2023    GERD without esophagitis     High risk medication use     Hypercholesteremia     Hypomagnesemia     Infected stasis ulcer of left lower extremity 01/13/2023    Insomnia     Low back pain     Major depressive disorder     Morbid (severe) obesity due to excess calories     MRSA pneumonia     Muscle weakness     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 without " evidence of necrosis     Obstructive sleep apnea (adult) (pediatric)     On home O2     REPORTS WEARING 2L/NC AAT    Other forms of dyspnea     Other long term (current) drug therapy     Other specified noninfective gastroenteritis and colitis     Other spondylosis, lumbar region     Pain in both knees     Paroxysmal atrial fibrillation     Peripheral neuropathy     attributed to type 2 diabetes    Pneumonia, unspecified organism     Polyneuropathy     Rash and other nonspecific skin eruption     Syncope and collapse     Tachycardia     Tinnitus 2023    Type 1 diabetes mellitus with diabetic chronic kidney disease     Type 2 diabetes mellitus     Unspecified fall, initial encounter     Urinary retention      Past Surgical History:   Procedure Laterality Date    CHOLECYSTECTOMY      CYSTOSCOPY      FEMUR SURGERY Left     Shravan placed    KNEE SURGERY Left     OTHER SURGICAL HISTORY Left     venous port, REMOVED    PORTACATH PLACEMENT Right     TIBIAL PLATEAU OPEN REDUCTION INTERNAL FIXATION Left 2023    Procedure: TIBIAL PLATEAU OPEN REDUCTION INTERNAL FIXATION;  Surgeon: Hugo Kline MD;  Location: [facility];  Service: Orthopedics;  Laterality: Left;    TONSILLECTOMY AND ADENOIDECTOMY       Family History   Problem Relation Age of Onset    Coronary artery disease Mother     Hypertension Mother     Diabetes type II Mother     Asthma Father     Diabetes type II Sister     Cancer Sister      Social History     Socioeconomic History    Marital status:    Tobacco Use    Smoking status: Former     Current packs/day: 0.00     Average packs/day: 1 pack/day for 12.0 years (12.0 ttl pk-yrs)     Types: Cigarettes     Start date:      Quit date:      Years since quittin.4     Passive exposure: Past    Smokeless tobacco: Never   Vaping Use    Vaping status: Never Used   Substance and Sexual Activity    Alcohol use: Not Currently    Drug use: Never    Sexual activity: Defer      Allergies   Allergen Reactions    Benadryl [Diphenhydramine] Itching    Proventil [Albuterol] Other (See Comments)     Mouth sores       Current Outpatient Medications   Medication Sig Dispense Refill    apixaban (Eliquis) 5 MG tablet tablet Take 1 tablet by mouth Every 12 (Twelve) Hours. 60 tablet 2    arformoterol (BROVANA) 15 MCG/2ML nebulizer solution Take 2 mL by nebulization 2 (Two) Times a Day. 360 mL 3    aspirin 81 MG EC tablet Take 1 tablet by mouth Every Morning. 30 tablet 2    atorvastatin (LIPITOR) 20 MG tablet Take 1 tablet by mouth Every Night. 30 tablet 2    budesonide (Pulmicort) 0.5 MG/2ML nebulizer solution Take 2 mL by nebulization 2 (Two) Times a Day. 360 mL 3    bumetanide (BUMEX) 2 MG tablet Take 1 tablet by mouth 2 (Two) Times a Day. 60 tablet 2    busPIRone (BUSPAR) 15 MG tablet Take 1 tablet by mouth 2 (Two) Times a Day. 60 tablet 2    calcium citrate (CALCITRATE) 950 (200 Ca) MG tablet Take 1 tablet by mouth Every Night. 30 tablet 2    carvedilol (COREG) 25 MG tablet Take 1 tablet by mouth Every 12 (Twelve) Hours. 60 tablet 2    Cholecalciferol (Vitamin D3) 50 MCG (2000 UT) tablet Take 1 tablet by mouth Every Morning. 30 tablet 2    dapagliflozin (Farxiga) 5 MG tablet tablet Take 1 tablet by mouth Every Morning. 30 tablet 2    docusate sodium (COLACE) 100 MG capsule Take 1 capsule by mouth Every Night. 30 capsule 2    DULoxetine (CYMBALTA) 30 MG capsule Take 1 capsule by mouth Every Morning. 30 capsule 2    exenatide er (BYDUREON) 2 MG/0.85ML auto-injector injection Inject 0.85 mL under the skin into the appropriate area as directed 1 (One) Time Per Week.      famotidine (PEPCID) 40 MG tablet Take 1 tablet by mouth Every Morning. 30 tablet 2    ferrous gluconate (FERGON) 324 MG tablet Take 1 tablet by mouth Every Morning. 30 tablet 2    finasteride (PROSCAR) 5 MG tablet Take 1 tablet by mouth Every Night. 30 tablet 2    folic acid (FOLVITE) 1 MG tablet Take 1 tablet by mouth Every  Night. 30 tablet 2    insulin detemir (Levemir) 100 UNIT/ML injection Inject 10 Units under the skin into the appropriate area as directed Every Night. 15 mL 2    Insulin Lispro (humaLOG) 100 UNIT/ML injection Inject 8 Units under the skin into the appropriate area as directed 3 (Three) Times a Day Before Meals. Per sliding scale 15 mL 0    ipratropium-albuterol (DUO-NEB) 0.5-2.5 mg/3 ml nebulizer Take 3 mL by nebulization Every 4 (Four) Hours As Needed for Wheezing or Shortness of Air. 360 mL 5    Ketoconazole 1 % shampoo Apply 10 mL topically Every 3 (Three) Days. 200 mL 0    levETIRAcetam (KEPPRA) 500 MG tablet Take 1 tablet by mouth 2 (Two) Times a Day. 60 tablet 2    magnesium oxide (MAG-OX) 400 tablet tablet Take 1 tablet by mouth Every Night. 30 tablet 2    montelukast (SINGULAIR) 10 MG tablet Take 1 tablet by mouth Every Night. 30 tablet 2    Multiple Vitamins-Iron (multivitamin with iron) tablet tablet Take 1 tablet by mouth Every Morning. 30 tablet 2    NIFEdipine XL (PROCARDIA XL) 30 MG 24 hr tablet Take 1 tablet by mouth Every Morning. 30 tablet 2    O2 (OXYGEN) 2 Liter O2 - CONTINUOUS (route: Oxygen)      traZODone (DESYREL) 50 MG tablet Take 1 tablet by mouth Every Night. 30 tablet 2    vitamin C (ASCORBIC ACID) 500 MG tablet Take 1 tablet by mouth 2 (Two) Times a Day. 60 tablet 2     No current facility-administered medications for this visit.     Review of Systems   Skin:         Ulcer feet   All other systems reviewed and are negative.      OBJECTIVE     Vitals:    06/24/24 1052   BP: 124/48   Pulse: 68   Temp: 96.9 °F (36.1 °C)   SpO2: (!) 88%       WBC   Date Value Ref Range Status   05/03/2024 11.17 (H) 3.40 - 10.80 10*3/mm3 Final   10/13/2020 10.79 4.80 - 10.80 10*3/uL Final   08/28/2020 9.47 4.5 - 11.0 10*3/uL Final     RBC   Date Value Ref Range Status   05/03/2024 2.97 (L) 4.14 - 5.80 10*6/mm3 Final   08/28/2020 3.31 (L) 4.5 - 5.9 10*6/uL Final     Hemoglobin   Date Value Ref Range Status    05/03/2024 8.2 (L) 13.0 - 17.7 g/dL Final   08/28/2020 9.0 (L) 13.5 - 17.5 g/dL Final     Hematocrit   Date Value Ref Range Status   05/03/2024 27.1 (L) 37.5 - 51.0 % Final   08/28/2020 28.8 (L) 41.0 - 53.0 % Final     MCV   Date Value Ref Range Status   05/03/2024 91.2 79.0 - 97.0 fL Final   08/28/2020 87.0 80.0 - 100.0 fL Final     MCH   Date Value Ref Range Status   05/03/2024 27.6 26.6 - 33.0 pg Final   08/28/2020 27.2 26.0 - 34.0 pg Final     MCHC   Date Value Ref Range Status   05/03/2024 30.3 (L) 31.5 - 35.7 g/dL Final   08/28/2020 31.3 31.0 - 37.0 g/dL Final     RDW   Date Value Ref Range Status   05/03/2024 14.6 12.3 - 15.4 % Final   08/28/2020 13.5 12.0 - 16.8 % Final     RDW-SD   Date Value Ref Range Status   05/03/2024 48.6 37.0 - 54.0 fl Final     MPV   Date Value Ref Range Status   05/03/2024 8.8 6.0 - 12.0 fL Final   08/28/2020 10.3 8.4 - 12.4 fL Final     Platelets   Date Value Ref Range Status   05/03/2024 296 140 - 450 10*3/mm3 Final   08/28/2020 177 140 - 440 10*3/uL Final     Neutrophil Rel %   Date Value Ref Range Status   08/28/2020 61.4 45 - 80 % Final     Neutrophil %   Date Value Ref Range Status   05/03/2024 71.5 42.7 - 76.0 % Final     Lymphocyte Rel %   Date Value Ref Range Status   08/28/2020 24.0 15 - 50 % Final     Lymphocyte %   Date Value Ref Range Status   05/03/2024 13.9 (L) 19.6 - 45.3 % Final     Monocyte Rel %   Date Value Ref Range Status   08/28/2020 9.7 0 - 15 % Final     Monocyte %   Date Value Ref Range Status   05/03/2024 9.2 5.0 - 12.0 % Final     Eosinophil %   Date Value Ref Range Status   05/03/2024 4.4 0.3 - 6.2 % Final   08/28/2020 3.4 0 - 7 % Final     Basophil Rel %   Date Value Ref Range Status   08/28/2020 0.5 0 - 2 % Final     Basophil %   Date Value Ref Range Status   05/03/2024 0.6 0.0 - 1.5 % Final     Immature Grans %   Date Value Ref Range Status   05/03/2024 0.4 0.0 - 0.5 % Final   08/28/2020 1.0 0.0 - 1.0 % Final     Neutrophils Absolute   Date Value  Ref Range Status   12/22/2021 13.9 (H) 1.7 - 6.0 x10(3)/ul Final   08/28/2020 5.82 2.0 - 8.8 10*3/uL Final     Neutrophils, Absolute   Date Value Ref Range Status   05/03/2024 7.99 (H) 1.70 - 7.00 10*3/mm3 Final     Lymphocytes Absolute   Date Value Ref Range Status   08/28/2020 2.27 0.7 - 5.5 10*3/uL Final     Lymphocytes, Absolute   Date Value Ref Range Status   05/03/2024 1.55 0.70 - 3.10 10*3/mm3 Final     Monocytes Absolute   Date Value Ref Range Status   08/28/2020 0.92 0.0 - 1.7 10*3/uL Final     Monocytes, Absolute   Date Value Ref Range Status   05/03/2024 1.03 (H) 0.10 - 0.90 10*3/mm3 Final     Eosinophils Absolute   Date Value Ref Range Status   12/22/2021 0.1 0.0 - 0.6 x10(3)/ul Final   08/28/2020 0.32 0.0 - 0.8 10*3/uL Final     Eosinophils, Absolute   Date Value Ref Range Status   05/03/2024 0.49 (H) 0.00 - 0.40 10*3/mm3 Final     Basophils Absolute   Date Value Ref Range Status   12/22/2021 0.1 0.0 - 0.3 x10(3)/ul Final   08/28/2020 0.05 0.0 - 0.2 10*3/uL Final     Basophils, Absolute   Date Value Ref Range Status   05/03/2024 0.07 0.00 - 0.20 10*3/mm3 Final     Immature Grans, Absolute   Date Value Ref Range Status   05/03/2024 0.04 0.00 - 0.05 10*3/mm3 Final   08/28/2020 0.09 0.00 - 0.10 10*3/uL Final     nRBC   Date Value Ref Range Status   05/03/2024 0.0 0.0 - 0.2 /100 WBC Final         Lab Results   Component Value Date    GLUCOSE 123 (H) 05/03/2024    BUN 29 (H) 05/03/2024    CREATININE 2.21 (H) 05/03/2024    EGFRIFNONA 46 (L) 07/27/2021    BCR 13.1 05/03/2024    K 3.5 05/03/2024    CO2 36.7 (H) 05/03/2024    CALCIUM 9.0 05/03/2024    PROTENTOTREF 7.1 08/22/2022    ALBUMIN 2.7 (L) 05/03/2024    LABIL2 0.8 08/22/2022    AST 22 05/03/2024    ALT 19 05/03/2024       Patient seen in no apparent distress.      PHYSICAL EXAM:     Foot/Ankle Exam    GENERAL  Appearance:  appears stated age  Orientation:  AAOx3  Affect:  appropriate  Gait:  unimpaired  Assistance:  independent  Right shoe gear: casual  shoe  Left shoe gear: casual shoe    VASCULAR     Right Foot Vascularity   Normal vascular exam    Dorsalis pedis:  2+  Posterior tibial:  2+  Skin temperature:  warm  Edema grading:  None  CFT:  < 3 seconds  Pedal hair growth:  Present  Varicosities:  none     Left Foot Vascularity   Normal vascular exam    Dorsalis pedis:  2+  Posterior tibial:  2+  Skin temperature:  warm  Edema grading:  None  CFT:  < 3 seconds  Pedal hair growth:  Present  Varicosities:  none     NEUROLOGIC     Right Foot Neurologic   Light touch sensation: absent  Vibratory sensation: absent  Hot/Cold sensation: absent  Protective Sensation using Vancleve-Deshaun Monofilament:   Sites intact: 10  Sites tested: 10     Left Foot Neurologic   Light touch sensation: absent  Vibratory sensation: absent  Hot/Cold sensation:  absent  Protective Sensation using Vancleve-Deshaun Monofilament:   Sites intact: 10  Sites tested: 10    MUSCLE STRENGTH     Right Foot Muscle Strength   Foot dorsiflexion:  2  Foot plantar flexion:  2  Foot inversion:  2  Foot eversion:  2     Left Foot Muscle Strength   Foot dorsiflexion:  2  Foot plantar flexion:  2  Foot inversion:  2  Foot eversion:  2    RANGE OF MOTION     Right Foot Range of Motion   Foot and ankle ROM within normal limits       Left Foot Range of Motion   Foot and ankle ROM within normal limits      DERMATOLOGIC      Right Foot Dermatologic   Skin  Right foot skin is intact.      Left Foot Dermatologic   Skin  Left foot skin is intact.      Left foot additional comments: Dry eschar to left heel and subfifth MPJ.  No erythema no malodor no drainage.      RADIOLOGY:        No results found.    ASSESSMENT/PLAN     Diagnoses and all orders for this visit:    1. Peripheral polyneuropathy (Primary)    2. Ulcer of foot, left, with unspecified severity    3. Type 2 diabetes mellitus with hyperglycemia, with long-term current use of insulin        Local wound care, daily dressing changes  Offload heels and foot  at all times when in the bed in wheelchair  Patient at risk for infection and amputation due to comorbidities and history of ulcerations    Patient would like to follow-up closer to [place] 6 weeks.    Comprehensive lower extremity examination and evaluation was performed.    Discussed findings and treatment plan including risks, benefits, and treatment options with patient in detail. Patient agreed with treatment plan.    Medications and allergies reviewed.  Reviewed available lab values along with other pertinent labs.  These were discussed with the patient.    An After Visit Summary was printed and given to the patient at discharge, including (if requested) any available informative/educational handouts regarding diagnosis, treatment, or medications. All questions were answered to patient/family satisfaction. Should symptoms fail to improve or worsen they agree to call or return to clinic or to go to the Emergency Department. Discussed the importance of following up with any needed screening tests/labs/specialist appointments and any requested follow-up recommended by me today. Importance of maintaining follow-up discussed and patient accepts that missed appointments can delay diagnosis and potentially lead to worsening of conditions.    Return in about 6 weeks (around 8/5/2024)., or sooner if acute issues arise.    This document has been electronically signed by Jordon Fuentes DPM on June 24, 2024 14:32 EDT

## 2024-06-28 ENCOUNTER — PATIENT OUTREACH (OUTPATIENT)
Dept: CASE MANAGEMENT | Facility: OTHER | Age: 59
End: 2024-06-28
Payer: COMMERCIAL

## 2024-06-28 DIAGNOSIS — E11.65 TYPE 2 DIABETES MELLITUS WITH HYPERGLYCEMIA, WITH LONG-TERM CURRENT USE OF INSULIN: ICD-10-CM

## 2024-06-28 DIAGNOSIS — N18.31 STAGE 3A CHRONIC KIDNEY DISEASE: ICD-10-CM

## 2024-06-28 DIAGNOSIS — L97.421 SKIN ULCER OF LEFT HEEL, LIMITED TO BREAKDOWN OF SKIN: ICD-10-CM

## 2024-06-28 DIAGNOSIS — J96.01 ACUTE HYPOXIC ON CHRONIC HYPERCAPNIC RESPIRATORY FAILURE: ICD-10-CM

## 2024-06-28 DIAGNOSIS — Z79.4 TYPE 2 DIABETES MELLITUS WITH HYPERGLYCEMIA, WITH LONG-TERM CURRENT USE OF INSULIN: ICD-10-CM

## 2024-06-28 DIAGNOSIS — J96.22 ACUTE ON CHRONIC RESPIRATORY FAILURE WITH HYPERCAPNIA: Primary | ICD-10-CM

## 2024-06-28 DIAGNOSIS — J96.12 ACUTE HYPOXIC ON CHRONIC HYPERCAPNIC RESPIRATORY FAILURE: ICD-10-CM

## 2024-06-28 RX ORDER — INSULIN LISPRO 100 [IU]/ML
INJECTION, SOLUTION INTRAVENOUS; SUBCUTANEOUS
Qty: 15 ML | Refills: 0 | Status: SHIPPED | OUTPATIENT
Start: 2024-06-28

## 2024-06-28 NOTE — OUTREACH NOTE
AMBULATORY CASE MANAGEMENT NOTE    Names and Relationships of Patient/Support Persons:  -     Reviewed chart, outreach created for follow up.  Mailed AVS for reminder.     Tara R  Ambulatory Case Management    6/28/2024, 11:51 EDT    [facility] End of Month Documentation    This Chronic Medical Management Care Plan for Preston Wallis, 59 y.o. male, has been established; monitored and managed; reviewed and a new plan of care implemented for the month of June.  A cumulative time of 45  minutes was spent on this patient record this month, including phone call with care giver; electronic communication with primary care provider; electronic communication with pharmacist; chart review; phone call with patient.    Regarding the patient's problems: has Pneumonia due to COVID-19 virus; Polyneuropathy; Paroxysmal atrial fibrillation; Obstructive sleep apnea; MRSA pneumonia; Low back pain; Chronic diastolic heart failure; Allergies; COPD exacerbation; Chronic anticoagulation; Benign prostatic hyperplasia; Impaired mobility and endurance; Stage 3a chronic kidney disease; Iron deficiency anemia secondary to inadequate dietary iron intake; Vitamin D deficiency; Class 3 severe obesity with serious comorbidity in adult; Lower extremity edema; Elevated alkaline phosphatase level; Venous insufficiency (chronic) (peripheral); Tobacco abuse, in remission; History of Pseudomonas pneumonia; Chronic dyspnea; Gastroesophageal reflux disease; Bronchiectasis without complication; ERIC (acute kidney injury); Altered mental status; Hyperlipidemia; Luetscher's syndrome; Neurogenic bladder; Class 1 obesity; Pneumonia of both lungs due to Pseudomonas species; Seizures; Primary osteoarthritis of left knee; Other constipation; Chronic obstructive pulmonary disease; Type 2 DM with CKD stage 3 and hypertension; Essential hypertension; Stage 3b chronic kidney disease; Annual physical exam; Long-term use of high-risk medication; Personal history of PE  (pulmonary embolism); Encounter for aftercare for healing closed traumatic fracture of left femur; Chronic pain of left knee; Primary osteoarthritis of right knee; Sepsis; Bronchiectasis with acute exacerbation; Bacterial pneumonia; Anemia; Closed fracture of left tibial plateau; Septic joint; Septic arthritis; Skin ulcer of left heel, limited to breakdown of skin; Ulcer of left foot, limited to breakdown of skin; Left foot pain; Wheezing; Acute on chronic respiratory failure with hypercapnia; Chronic respiratory failure with hypoxia and hypercapnia; Acute hypoxic on chronic hypercapnic respiratory failure; and Impaired cognition on their problem list., the following items were addressed: medications; transitions to medical care; medical records; referrals to community service providers and any changes can be found within the plan section of the note.  A detailed listing of time spent for chronic care management is tracked within each outreach encounter.  Current medications include:  has a current medication list which includes the following prescription(s): apixaban, arformoterol, aspirin, atorvastatin, budesonide, bumetanide, buspirone, calcium citrate, carvedilol, vitamin d3, dapagliflozin, docusate sodium, duloxetine, exenatide er, famotidine, ferrous gluconate, finasteride, folic acid, levemir, insulin lispro (1 unit dial), ipratropium-albuterol, ketoconazole, levetiracetam, magnesium oxide, montelukast, multivitamin with iron, nifedipine xl, o2, trazodone, and vitamin c. and the patient is reported to be caregiver will take responsibility for med compliance; patient is compliant with medication protocol,  Medications are reported to be non-effective in controlling symptoms and changes have been made to the medication protocol.  Regarding these diagnoses, referrals were made to the following provider(s):  specialists.  All notes on chart for PCP to review.    The patient was monitored remotely for pain;  medications; blood glucose; mood & behavior.    The patient's physical needs include:  help taking medications as prescribed; medication education; needs assistance with ADLs; resources for disability needs; DME supplies.     The patient's mental support needs include:  continued support    The patient's cognitive support needs include:  medication; continued support; needs assistance with ADLs; health care    The patient's psychosocial support needs include:  continued support; coordination of community providers; medication management or adherence    The patient's functional needs include: health care coverage; medication education; needs assistance for ADLs; physical healthcare; physician referral; resources for disability needs    The patient's environmental needs include:  resources for disability needs    Care Plan overall comments:  Still not at goal, however improving. will continue to follow. Has many upcoming appointments and referrals to specialists. Multiple hospitalizations./readmissions.    Refer to previous outreach notes for more information on the areas listed above.    Monthly Billing Diagnoses  (J96.22) Acute on chronic respiratory failure with hypercapnia    (N18.31) Stage 3a chronic kidney disease    (J96.01,  J96.12) Acute hypoxic on chronic hypercapnic respiratory failure    (L97.421) Skin ulcer of left heel, limited to breakdown of skin    (E11.65,  Z79.4) Type 2 diabetes mellitus with hyperglycemia, with long-term current use of insulin    Medications   Medications have been reconciled    Care Plan progress this month:      Recently Modified Care Plans Updates made since 5/28/2024 12:00 AM      No recently modified care plans.            Current Specialty Plan of Care Status signed by both patient and provider    Instructions   Patient was provided an electronic copy of care plan  CCM services were explained and offered and patient has accepted these services.  Patient has given their  written consent to receive CCM services and understands that this includes the authorization of electronic communication of medical information with the other treating providers.  Patient understands that they may stop CCM services at any time and these changes will be effective at the end of the calendar month and will effectively revocate the agreement of CCM services.  Patient understands that only one practitioner can furnish and be paid for CCM services during one calendar month.  Patient also understands that there may be co-payment or deductible fees in association with CCM services.  Patient will continue with at least monthly follow-up calls with the Ambulatory .    Tara GOMEZ  Ambulatory Case Management    6/28/2024, 11:51 EDT

## 2024-07-02 ENCOUNTER — PATIENT OUTREACH (OUTPATIENT)
Dept: CASE MANAGEMENT | Facility: OTHER | Age: 59
End: 2024-07-02
Payer: COMMERCIAL

## 2024-07-02 DIAGNOSIS — N18.31 STAGE 3A CHRONIC KIDNEY DISEASE: Primary | ICD-10-CM

## 2024-07-02 NOTE — OUTREACH NOTE
AMBULATORY CASE MANAGEMENT NOTE    Names and Relationships of Patient/Support Persons: Caller: Kami Wallis; Relationship: Emergency Contact  Caller: [facility] - [place], [place] - [address] [facility] [phone] [facility] [phone] FX; Relationship: Pharmacy -     Kami and I reviewed Gómez care.  He is rescheduled to see Neli Paredes this week.  She admits that he drinks 2-3 sweet teas (32oz) daily. Not only does this elevate his blood sugar, he has to pee so much and she is cathing him.    He saw the podiatrist and his foot is improving.   Elizabeth reports home health coming and fl[place].    She is not aware of any lab orders from nephrology, will call and get faxed.    Reviewed all medication bottles, pharmacy omitted 3 prescriptions, called pharmacy , they will get ready by this afternoon.        Tara GOMEZ  Ambulatory Case Management    7/2/2024, 10:22 EDT

## 2024-07-05 ENCOUNTER — PATIENT OUTREACH (OUTPATIENT)
Dept: CASE MANAGEMENT | Facility: OTHER | Age: 59
End: 2024-07-05
Payer: COMMERCIAL

## 2024-07-05 DIAGNOSIS — N18.31 STAGE 3A CHRONIC KIDNEY DISEASE: Primary | ICD-10-CM

## 2024-07-05 NOTE — OUTREACH NOTE
"AMBULATORY CASE MANAGEMENT NOTE    Names and Relationships of Patient/Support Persons: Caller: Preston Wallis \"Gómez\"; Relationship: Self -     Gómez cancelled his appointment with diabetes management.     Also, never received lab order from nephrology.       Tara GOMEZ  Ambulatory Case Management    7/5/2024, 15:05 EDT  "

## 2024-07-09 ENCOUNTER — PATIENT OUTREACH (OUTPATIENT)
Dept: CASE MANAGEMENT | Facility: OTHER | Age: 59
End: 2024-07-09
Payer: COMMERCIAL

## 2024-07-09 DIAGNOSIS — Z74.09 IMPAIRED MOBILITY AND ENDURANCE: Primary | ICD-10-CM

## 2024-07-09 NOTE — OUTREACH NOTE
"AMBULATORY CASE MANAGEMENT NOTE    Names and Relationships of Patient/Support Persons: Caller: Preston Wallis \"Gómez\"; Relationship: Self -       Elizabeth called to state that [facility] is needing a letter for transport for Gómez.  Called [facility], they do not ever ask for letters, need to call [facility].  Reached out to [facility], they have a Medical Transportation Form on file from 24.  Possibly .  They are going to have someone call me back once they figure out what the phone call is about.     Gómez cancelled his appointment with nephrology due to it is the same day as wound care.    Called nephrology office for the 4th time to get lab order faxed to diagnostic center.  Clearly there is a deficit with our fax machines.      Tara GOMEZ  Ambulatory Case Management    2024, 12:54 EDT  "

## 2024-07-11 ENCOUNTER — OFFICE VISIT (OUTPATIENT)
Dept: FAMILY MEDICINE CLINIC | Age: 59
End: 2024-07-11
Payer: COMMERCIAL

## 2024-07-11 VITALS
OXYGEN SATURATION: 94 % | TEMPERATURE: 98.5 F | HEIGHT: 69 IN | HEART RATE: 71 BPM | SYSTOLIC BLOOD PRESSURE: 130 MMHG | BODY MASS INDEX: 38.82 KG/M2 | DIASTOLIC BLOOD PRESSURE: 46 MMHG

## 2024-07-11 DIAGNOSIS — Z78.9 DIFFICULTY USING CONTINUOUS POSITIVE AIRWAY PRESSURE (CPAP) DEVICE: ICD-10-CM

## 2024-07-11 DIAGNOSIS — F32.A ANXIETY AND DEPRESSION: ICD-10-CM

## 2024-07-11 DIAGNOSIS — K59.09 OTHER CONSTIPATION: ICD-10-CM

## 2024-07-11 DIAGNOSIS — I50.32 CHRONIC DIASTOLIC (CONGESTIVE) HEART FAILURE: ICD-10-CM

## 2024-07-11 DIAGNOSIS — N18.30 TYPE 2 DM WITH CKD STAGE 3 AND HYPERTENSION: ICD-10-CM

## 2024-07-11 DIAGNOSIS — R60.0 LOWER EXTREMITY EDEMA: Chronic | ICD-10-CM

## 2024-07-11 DIAGNOSIS — I87.2 VENOUS INSUFFICIENCY (CHRONIC) (PERIPHERAL): ICD-10-CM

## 2024-07-11 DIAGNOSIS — I12.9 TYPE 2 DM WITH CKD STAGE 3 AND HYPERTENSION: ICD-10-CM

## 2024-07-11 DIAGNOSIS — Z79.4 TYPE 2 DIABETES MELLITUS WITH HYPERGLYCEMIA, WITH LONG-TERM CURRENT USE OF INSULIN: Primary | ICD-10-CM

## 2024-07-11 DIAGNOSIS — Z00.00 ANNUAL PHYSICAL EXAM: ICD-10-CM

## 2024-07-11 DIAGNOSIS — I48.0 PAROXYSMAL ATRIAL FIBRILLATION: ICD-10-CM

## 2024-07-11 DIAGNOSIS — Z23 ENCOUNTER FOR IMMUNIZATION: ICD-10-CM

## 2024-07-11 DIAGNOSIS — E78.2 MIXED HYPERLIPIDEMIA: ICD-10-CM

## 2024-07-11 DIAGNOSIS — F41.9 ANXIETY AND DEPRESSION: ICD-10-CM

## 2024-07-11 DIAGNOSIS — J44.1 COPD EXACERBATION: ICD-10-CM

## 2024-07-11 DIAGNOSIS — D50.8 IRON DEFICIENCY ANEMIA SECONDARY TO INADEQUATE DIETARY IRON INTAKE: ICD-10-CM

## 2024-07-11 DIAGNOSIS — E55.9 VITAMIN D DEFICIENCY: ICD-10-CM

## 2024-07-11 DIAGNOSIS — F51.01 PRIMARY INSOMNIA: ICD-10-CM

## 2024-07-11 DIAGNOSIS — E11.65 TYPE 2 DIABETES MELLITUS WITH HYPERGLYCEMIA, WITH LONG-TERM CURRENT USE OF INSULIN: Primary | ICD-10-CM

## 2024-07-11 DIAGNOSIS — Z12.11 COLON CANCER SCREENING: ICD-10-CM

## 2024-07-11 DIAGNOSIS — E11.22 TYPE 2 DM WITH CKD STAGE 3 AND HYPERTENSION: ICD-10-CM

## 2024-07-11 DIAGNOSIS — Z12.5 PROSTATE CANCER SCREENING: ICD-10-CM

## 2024-07-11 PROCEDURE — 3051F HG A1C>EQUAL 7.0%<8.0%: CPT | Performed by: FAMILY MEDICINE

## 2024-07-11 PROCEDURE — 1160F RVW MEDS BY RX/DR IN RCRD: CPT | Performed by: FAMILY MEDICINE

## 2024-07-11 PROCEDURE — 3078F DIAST BP <80 MM HG: CPT | Performed by: FAMILY MEDICINE

## 2024-07-11 PROCEDURE — 90471 IMMUNIZATION ADMIN: CPT | Performed by: FAMILY MEDICINE

## 2024-07-11 PROCEDURE — 90750 HZV VACC RECOMBINANT IM: CPT | Performed by: FAMILY MEDICINE

## 2024-07-11 PROCEDURE — 99396 PREV VISIT EST AGE 40-64: CPT | Performed by: FAMILY MEDICINE

## 2024-07-11 PROCEDURE — 1159F MED LIST DOCD IN RCRD: CPT | Performed by: FAMILY MEDICINE

## 2024-07-11 PROCEDURE — 1125F AMNT PAIN NOTED PAIN PRSNT: CPT | Performed by: FAMILY MEDICINE

## 2024-07-11 PROCEDURE — 3075F SYST BP GE 130 - 139MM HG: CPT | Performed by: FAMILY MEDICINE

## 2024-07-11 RX ORDER — BUDESONIDE AND FORMOTEROL FUMARATE DIHYDRATE 160; 4.5 UG/1; UG/1
2 AEROSOL RESPIRATORY (INHALATION)
COMMUNITY

## 2024-07-11 RX ORDER — SEMAGLUTIDE 0.68 MG/ML
0.25 INJECTION, SOLUTION SUBCUTANEOUS WEEKLY
Qty: 3 ML | Refills: 0 | Status: SHIPPED | OUTPATIENT
Start: 2024-07-11

## 2024-07-11 RX ORDER — ALBUTEROL SULFATE 2.5 MG/3ML
2.5 SOLUTION RESPIRATORY (INHALATION)
COMMUNITY

## 2024-07-11 RX ORDER — TAMSULOSIN HYDROCHLORIDE 0.4 MG/1
CAPSULE ORAL
COMMUNITY
Start: 2024-05-28

## 2024-07-11 RX ORDER — ONDANSETRON 4 MG/1
4 TABLET, FILM COATED ORAL
COMMUNITY
Start: 2024-02-08

## 2024-07-11 RX ORDER — DULOXETIN HYDROCHLORIDE 60 MG/1
60 CAPSULE, DELAYED RELEASE ORAL DAILY
Qty: 90 CAPSULE | Refills: 1 | Status: SHIPPED | OUTPATIENT
Start: 2024-07-11

## 2024-07-11 RX ORDER — ISOPROPYL MYRISTATE
1 LIQUID (ML) MISCELLANEOUS
COMMUNITY
Start: 2024-03-13

## 2024-07-11 NOTE — PROGRESS NOTES
Preston Wallis presents to [facility].    Chief Complaint: Annual physical and med eval        Subjective     History of Present Illness:    Patient is in today for yearly physical.  Last colonoscopy: Nine 822-8 mm polyp noted, repeat 5 years  Last PSA:   5/28/23-normal 0.2  Nocturia: 1-2 times  Sexual concerns: None  EtOH use: None  Tobacco use: None  He wears a seatbelt in the car  Drug use: None  Last eye exam: Recommend yearly eye exams  Dental exams every 6 months  IMMUNIZATIONS:   COVID BOOSTER, FLU, SHINGRIX   DIET: Diabetic  EXERCISE: None  HEARING: No issues      I am seeing Gómez today to follow-up for his COPD and shortness of air.  In the clinic his O2 sats are low in the low 80 percentile but when we bump up his oxygen to 2 L continuous his oxygen levels are in the mid 90s and he is doing well.  He does not have any respiratory distress.  He is currently stable on Brovana, Proventil, Pulmicort, Symbicort, Bumex, DuoNebs, Singulair and 2 L of oxygen at home.  He was seen back in May for recent hospitalization on 4/28/2024.  He was hospitalized for increased lethargy elevated blood sugars and shortness of air with acute on chronic hypoxia and hypercapnic respiratory failure.  He was treated with a thoracentesis and IV Zofran and cefepime.  His sputum culture was positive for Pseudomonas pneumonia.  He was given Bumex for his lower extremity edema and CHF.  He has a Port-A-Cath in place.  He was discharged on antibiotics and has done well since then and has been hospitalized since then.  He is eating well.  Bowel movements are normal.  He is struggling to sleep at night.  He says his peripheral neuropathy pain is much worse and it keeps him from sleeping.      He has 2 diabetic foot ulcers and he is managed by podiatry     He presents with type 2 diabetes now insulin requiring and his blood sugars have been stable and well-controlled with ozempic, Farxiga,  long-acting Levemir  10 units daily and short acting lispro SSI 2-6 units with meals and QHS.  He tolerates meds well.  Home Bs are ranging from 60s-250.  He feels good     He presents with chronic hypercholesterolemia and is currently stable on atorvastatin and tolerates meds well.     He has chronic anxiety with depression are not well controlledand he is stable and doing well with Cymbalta and BuSpar.  He is sleeping well at night with trazodone.     He has sleep apnea and does not tolerate his cpap-I advised he sleep upright with his hospital bed if he does not use the cpap and f/u sleep specialist about his poor tolerance to cpap    He presents with chronic hypertension remains stable on Coreg, nifedipine and he tolerates meds well     He has an underlying seizure disorder and has been seizure-free on Keppra 500 mg twice daily.  He is to follow-up with neurology as directed     His BPH is stable on Proscar.    Stage 3 b kidney failure, sees nephrology, sees Dr Martínez, renal function is stable, he is to avoid NSAIDs and push fluids    On Vit D supplementation    Gerd is stable on pepcid, he is to avoid spicy and acidic foods     Constipation is stable on RADHA         Result Review   The following data was reviewed by Kimmy Riley MD on 07/11/2024.  Lab Results   Component Value Date    WBC 11.17 (H) 05/03/2024    HGB 8.2 (L) 05/03/2024    HCT 27.1 (L) 05/03/2024    MCV 91.2 05/03/2024     05/03/2024     Lab Results   Component Value Date    GLUCOSE 123 (H) 05/03/2024    BUN 29 (H) 05/03/2024    CREATININE 2.21 (H) 05/03/2024    EGFR 33.7 (L) 05/03/2024    BCR 13.1 05/03/2024    K 3.5 05/03/2024    CO2 36.7 (H) 05/03/2024    CALCIUM 9.0 05/03/2024    PROTENTOTREF 7.1 08/22/2022    ALBUMIN 2.7 (L) 05/03/2024    BILITOT 0.2 05/03/2024    AST 22 05/03/2024    ALT 19 05/03/2024     Lab Results   Component Value Date    CHOL 130 09/07/2023    CHLPL 165 08/26/2020    TRIG 205 (H) 09/07/2023    HDL 48 09/07/2023    LDL 49  09/07/2023     Lab Results   Component Value Date    TSH 1.497 04/13/2024     Lab Results   Component Value Date    HGBA1C 7.3 (A) 05/13/2024     Lab Results   Component Value Date    PSA 0.203 05/25/2023    PSA 0.725 03/17/2022     Lab Results   Component Value Date    IRON 46 (L) 05/01/2024      Lab Results   Component Value Date    HGIG73II 25.5 (L) 05/25/2023               Assessment and Plan:   Diagnoses and all orders for this visit:    1. Type 2 diabetes mellitus with hyperglycemia, with long-term current use of insulin (Primary)  Comments:  Overall hemoglobin A1c is great and blood sugars are stable.  Recommend yearly eye exam.  Recommend diabetic shoes    2. Annual physical exam    3. Prostate cancer screening  Comments:  PSA ordered  Orders:  -     PSA SCREENING; Future    4. Colon cancer screening  Comments:  colonscopy is UTD    5. Encounter for immunization  Comments:  Recommend Shingrix and COVID-vaccine-she defers, recommend flu vaccine in the fall  Orders:  -     Shingrix Vaccine    6. Difficulty using continuous positive airway pressure (CPAP) device  Comments:  Recommend follow-up with sleep specialist but she defers at this time    7. Vitamin D deficiency  Comments:  Stable on vitamin D supplementation.  Will check labs  Orders:  -     Vitamin D 25 hydroxy; Future    8. Type 2 DM with CKD stage 3 and hypertension  -     Basic metabolic panel; Future    9. Lower extremity edema  Comments:  Well-controlled on Bumex.  Will check renal function and potassium levels on labs    10. COPD exacerbation  Comments:  He is stable and doing well.  No changes in current meds or treatment plan.  Continue current meds, O2, and follow-up with pulmonology as directed    11. Iron deficiency anemia secondary to inadequate dietary iron intake  Comments:  Stable on iron supplementation.  Constipation is controlled with DOS.  He will watch closely since we are starting him on Ozempic    12. Venous insufficiency  (chronic) (peripheral)  Comments:  Stable    13. Paroxysmal atrial fibrillation  Comments:  Normal sinus rhythm today.  Follow-up with cardiology as directed.  No changes in current meds or treatment plan.  Continue Eliquis-no SI/SX bruising/GI bleed    14. Chronic diastolic (congestive) heart failure  Comments:  Stable and under good control.  No acute symptoms today.  LE edema looks good on Bumex    15. Mixed hyperlipidemia  -     Lipid panel; Future    16. Other constipation  Comments:  Constipation is controlled with DOS. He will watch closely since we are starting him on Ozempic, he will call if no BM within 3 days and take MiraLAX    17. Primary insomnia  Comments:  Due to peripheral neuropathy pain.  Will start him on topical gabapentin with diclofenac cream    18. Anxiety and depression  Comments:  Worse.  Will go ahead and increase his Cymbalta back up to 60 mg daily and continue BuSpar  Orders:  -     DULoxetine (Cymbalta) 60 MG capsule; Take 1 capsule by mouth Daily.  Dispense: 90 capsule; Refill: 1    Other orders  -     Semaglutide,0.25 or 0.5MG/DOS, (Ozempic, 0.25 or 0.5 MG/DOSE,) 2 MG/3ML solution pen-injector; Inject 0.25 mg under the skin into the appropriate area as directed 1 (One) Time Per Week.  Dispense: 3 mL; Refill: 0              Objective     Medications:  Current Outpatient Medications   Medication Instructions    albuterol (PROVENTIL) 2.5 mg, Inhalation    apixaban (ELIQUIS) 5 mg, Oral, Every 12 Hours    arformoterol (BROVANA) 15 mcg, Nebulization, 2 Times Daily - RT    aspirin 81 mg, Oral, Every Morning    atorvastatin (LIPITOR) 20 mg, Oral, Nightly    budesonide (PULMICORT) 0.5 mg, Nebulization, 2 Times Daily    budesonide-formoterol (Symbicort) 160-4.5 MCG/ACT inhaler 2 puffs, Inhalation    bumetanide (BUMEX) 2 mg, Oral, 2 Times Daily    busPIRone (BUSPAR) 15 mg, Oral, 2 Times Daily    calcium citrate (CALCITRATE) 950 mg, Oral, Nightly    carvedilol (COREG) 25 mg, Oral, Every 12 Hours  "Scheduled    dapagliflozin (FARXIGA) 5 mg, Oral, Every Morning    docusate sodium (COLACE) 100 mg, Oral, Nightly    DULoxetine (CYMBALTA) 60 mg, Oral, Daily    famotidine (PEPCID) 40 mg, Oral, Every Morning    ferrous gluconate (FERGON) 324 mg, Oral, Every Morning    finasteride (PROSCAR) 5 mg, Oral, Nightly    folic acid (FOLVITE) 1 mg, Oral, Nightly    Insulin Lispro, 1 Unit Dial, (HUMALOG) 100 UNIT/ML solution pen-injector inject EIGHT units under the skin INTO THE APPROPRIATE AREA THREE TIMES DAILY BEFORE meals PER sliding scale    ipratropium-albuterol (DUO-NEB) 0.5-2.5 mg/3 ml nebulizer 3 mL, Nebulization, Every 4 Hours PRN    Isopropyl Myristate solution 1 Application, Topical    Ketoconazole 1 % shampoo 10 mL, Apply externally, Every 3 Days    Levemir 10 Units, Subcutaneous, Nightly    levETIRAcetam (KEPPRA) 500 mg, Oral, 2 Times Daily    magnesium oxide (MAG-OX) 400 mg, Oral, Nightly    montelukast (SINGULAIR) 10 mg, Oral, Nightly    Multiple Vitamins-Iron (multivitamin with iron) tablet tablet 1 tablet, Oral, Every Morning    NIFEdipine XL (PROCARDIA XL) 30 mg, Oral, Every Morning    O2 (OXYGEN) 2 Liter O2 - CONTINUOUS (route: Oxygen)    ondansetron (ZOFRAN) 4 mg, Oral    Ozempic (0.25 or 0.5 MG/DOSE) 0.25 mg, Subcutaneous, Weekly    tamsulosin (FLOMAX) 0.4 MG capsule 24 hr capsule     traZODone (DESYREL) 50 mg, Oral, Nightly    vitamin C (ASCORBIC ACID) 500 mg, Oral, 2 Times Daily    Vitamin D3 50 mcg, Oral, Every Morning        Vital Signs:   /46 (BP Location: Left arm, Patient Position: Sitting)   Pulse 71   Temp 98.5 °F (36.9 °C) (Oral)   Ht 175.3 cm (69.02\")   SpO2 94%   BMI 38.82 kg/m²             Physical Exam:  Physical Exam  Vitals and nursing note reviewed.   Constitutional:       General: He is not in acute distress.     Appearance: Normal appearance. He is not ill-appearing, toxic-appearing or diaphoretic.   HENT:      Head: Normocephalic and atraumatic.      Right Ear: Tympanic " membrane, ear canal and external ear normal.      Left Ear: Tympanic membrane, ear canal and external ear normal.      Nose: Nose normal. No congestion or rhinorrhea.      Mouth/Throat:      Pharynx: Oropharynx is clear. No oropharyngeal exudate or posterior oropharyngeal erythema.   Eyes:      Conjunctiva/sclera: Conjunctivae normal.   Cardiovascular:      Rate and Rhythm: Normal rate.      Pulses: Normal pulses.           Dorsalis pedis pulses are 2+ on the right side and 2+ on the left side.   Pulmonary:      Effort: Pulmonary effort is normal. No respiratory distress.      Breath sounds: Normal breath sounds. No stridor. No wheezing, rhonchi or rales.   Musculoskeletal:         General: Normal range of motion.      Cervical back: Normal range of motion and neck supple. No rigidity.      Right foot: Deformity present.   Feet:      Right foot:      Protective Sensation: 7 sites tested.  0 sites sensed.      Skin integrity: Skin integrity normal. No ulcer or blister.      Toenail Condition: Right toenails are normal.      Comments: Diabetic Foot Exam Performed and Monofilament Test Performed     Lymphadenopathy:      Cervical: No cervical adenopathy.   Skin:     General: Skin is warm and dry.      Capillary Refill: Capillary refill takes less than 2 seconds.   Neurological:      Mental Status: He is alert and oriented to person, place, and time.   Psychiatric:         Mood and Affect: Mood normal.         Behavior: Behavior normal.           Review of Systems:  Review of Systems   Constitutional:  Negative for chills, fatigue and fever.   HENT:  Negative for congestion, ear discharge and sore throat.    Respiratory:  Negative for shortness of breath.    Cardiovascular:  Negative for chest pain.   Gastrointestinal:  Negative for abdominal pain, constipation, diarrhea, nausea, vomiting and GERD.   Genitourinary:  Negative for flank pain.   Neurological:  Negative for dizziness and headache.   Psychiatric/Behavioral:   "Negative for depressed mood.               Follow Up   Return in about 3 months (around 10/11/2024) for Recheck.    Part of this note may be an electronic transcription/translation of spoken language to printed   text using the Dragon Dictation System.            Medical History:  Medications Discontinued During This Encounter   Medication Reason    exenatide er (BYDUREON) 2 MG/0.85ML auto-injector injection *Therapy completed    DULoxetine (CYMBALTA) 30 MG capsule       Past Medical History:    Age-related cognitive decline    Allergic contact dermatitis    Allergies    Anemia    Bedbound    11/2023 \"MY LEG MUSCLES STOPPED WORKING\"    Bronchiectasis with acute lower respiratory infection    Charcot foot due to diabetes mellitus    Chronic diastolic (congestive) heart failure    Chronic kidney disease    Chronic respiratory failure with hypoxia    Closed supracondylar fracture of femur    COPD (chronic obstructive pulmonary disease)    Deep vein thrombosis (DVT) of lower extremity associated with air travel    Dependence on supplemental oxygen    Eczema    Erectile dysfunction    due to organic reasons    Essential (primary) hypertension    Fracture    closed fracture of other tarsal and metatarsal bones    Fracture of proximal humerus    GERD without esophagitis    High risk medication use    Hypercholesteremia    Hypomagnesemia    Infected stasis ulcer of left lower extremity    Insomnia    Low back pain    Major depressive disorder    Morbid (severe) obesity due to excess calories    MRSA pneumonia    Muscle weakness    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 without evidence of necrosis    Obstructive sleep apnea (adult) (pediatric)    On home O2    REPORTS WEARING 2L/NC AAT    Other forms of dyspnea    Other long term (current) drug therapy    Other specified noninfective gastroenteritis and colitis    Other spondylosis, lumbar region    Pain in both knees    " Paroxysmal atrial fibrillation    Peripheral neuropathy    attributed to type 2 diabetes    Pneumonia, unspecified organism    Polyneuropathy    Rash and other nonspecific skin eruption    Syncope and collapse    Tachycardia    Tinnitus    Type 1 diabetes mellitus with diabetic chronic kidney disease    Type 2 diabetes mellitus    Unspecified fall, initial encounter    Urinary retention     Past Surgical History:    CHOLECYSTECTOMY    CYSTOSCOPY    FEMUR SURGERY    Shravan placed    KNEE SURGERY    OTHER SURGICAL HISTORY    venous port, REMOVED    PORTACATH PLACEMENT    TIBIAL PLATEAU OPEN REDUCTION INTERNAL FIXATION    Procedure: TIBIAL PLATEAU OPEN REDUCTION INTERNAL FIXATION;  Surgeon: Hugo Kline MD;  Location: [facility];  Service: Orthopedics;  Laterality: Left;    TONSILLECTOMY AND ADENOIDECTOMY      Family History   Problem Relation Age of Onset    Coronary artery disease Mother     Hypertension Mother     Diabetes type II Mother     Asthma Father     Diabetes type II Sister     Cancer Sister      Social History     Tobacco Use    Smoking status: Former     Current packs/day: 0.00     Average packs/day: 1 pack/day for 12.0 years (12.0 ttl pk-yrs)     Types: Cigarettes     Start date:      Quit date:      Years since quittin.5     Passive exposure: Past    Smokeless tobacco: Never   Substance Use Topics    Alcohol use: Not Currently       There are no preventive care reminders to display for this patient.       Immunization History   Administered Date(s) Administered    Flu Vaccine Quad PF >36MO 10/18/2016, 10/16/2017, 2019    Flu Vaccine Split Quad 2019    Fluzone (or Fluarix & Flulaval for VFC) >6mos 10/18/2016, 10/16/2017, 2019, 10/19/2022, 2023    Influenza Injectable Mdck Pf Quad 10/19/2022    Influenza Quad Vaccine (Inpatient) 2013    Influenza, Unspecified 2020    PEDS-Pneumococcal Conjugate (PCV7) 2023    Pneumococcal Conjugate  20-Valent (PCV20) 11/14/2023    Pneumococcal Polysaccharide (PPSV23) 11/20/1997    Shingrix 07/11/2024    Tdap 09/18/2018    influenza Split 11/04/2019       Allergies   Allergen Reactions    Benadryl [Diphenhydramine] Itching    Proventil [Albuterol] Other (See Comments)     Mouth sores

## 2024-07-15 ENCOUNTER — OFFICE VISIT (OUTPATIENT)
Dept: WOUND CARE | Facility: HOSPITAL | Age: 59
End: 2024-07-15
Payer: COMMERCIAL

## 2024-07-15 VITALS
HEART RATE: 69 BPM | TEMPERATURE: 97.7 F | RESPIRATION RATE: 18 BRPM | DIASTOLIC BLOOD PRESSURE: 56 MMHG | SYSTOLIC BLOOD PRESSURE: 130 MMHG

## 2024-07-15 DIAGNOSIS — L97.509 TYPE 2 DIABETES MELLITUS WITH FOOT ULCER, WITH LONG-TERM CURRENT USE OF INSULIN: ICD-10-CM

## 2024-07-15 DIAGNOSIS — Z99.81 CHRONIC RESPIRATORY FAILURE WITH HYPOXIA, ON HOME O2 THERAPY: ICD-10-CM

## 2024-07-15 DIAGNOSIS — L89.620 PRESSURE INJURY OF LEFT HEEL, UNSTAGEABLE: Primary | ICD-10-CM

## 2024-07-15 DIAGNOSIS — E11.621 TYPE 2 DIABETES MELLITUS WITH FOOT ULCER, WITH LONG-TERM CURRENT USE OF INSULIN: ICD-10-CM

## 2024-07-15 DIAGNOSIS — I50.32 CHRONIC DIASTOLIC HEART FAILURE: ICD-10-CM

## 2024-07-15 DIAGNOSIS — I73.9 PERIPHERAL ARTERIAL DISEASE: ICD-10-CM

## 2024-07-15 DIAGNOSIS — Z79.4 TYPE 2 DIABETES MELLITUS WITH FOOT ULCER, WITH LONG-TERM CURRENT USE OF INSULIN: ICD-10-CM

## 2024-07-15 DIAGNOSIS — L89.890 PRESSURE INJURY OF LEFT FOOT, UNSTAGEABLE: ICD-10-CM

## 2024-07-15 DIAGNOSIS — J96.11 CHRONIC RESPIRATORY FAILURE WITH HYPOXIA, ON HOME O2 THERAPY: ICD-10-CM

## 2024-07-15 DIAGNOSIS — G47.33 OBSTRUCTIVE SLEEP APNEA: ICD-10-CM

## 2024-07-15 PROCEDURE — 1159F MED LIST DOCD IN RCRD: CPT | Performed by: EMERGENCY MEDICINE

## 2024-07-15 PROCEDURE — G0463 HOSPITAL OUTPT CLINIC VISIT: HCPCS | Performed by: EMERGENCY MEDICINE

## 2024-07-15 PROCEDURE — 1160F RVW MEDS BY RX/DR IN RCRD: CPT | Performed by: EMERGENCY MEDICINE

## 2024-07-15 PROCEDURE — 3075F SYST BP GE 130 - 139MM HG: CPT | Performed by: EMERGENCY MEDICINE

## 2024-07-15 PROCEDURE — 3078F DIAST BP <80 MM HG: CPT | Performed by: EMERGENCY MEDICINE

## 2024-07-15 NOTE — PROGRESS NOTES
Chief Complaint  Wound Check (WOUND CHECK TO LEFT HEEL, PT HAS HH 1X WEEKLY, PT FEELS THE WOUNDS 'MIGHT BE GETTING BETTER'. NOT ON ANTIBIOTICS () )    Subjective      History of Present Illness    Preston Wallis  is a 59 y.o. male with multiple medical problems including COPD and chronic respiratory failure on oxygen, chronic anemia, chronic diastolic CHF, A-fib, and type 2 diabetes.  Patient was referred here by his podiatrist for evaluation of wounds on his left foot.  Patient reports that these have been there since about December 2023.  He has had repeated hospitalizations, primarily for respiratory issues, and he and his wife believe they started due to extended illness but are not completely sure.      Currently they are applying Betadine soaked gauze to the wounds on his left heel and left fifth MPJ.  Patient reports that he mostly stays in bed and his wife helps him a great deal.  They did give a offloading boot and he tries to stay off of the wound is much as possible.  He does typically sleep on his back and has been advised on different strategies to prevent pressure on the wound at that time.      Allergies:  Benadryl [diphenhydramine] and Proventil [albuterol]      Current Outpatient Medications:     albuterol (PROVENTIL) (2.5 MG/3ML) 0.083% nebulizer solution, Inhale 2.5 mg., Disp: , Rfl:     apixaban (Eliquis) 5 MG tablet tablet, Take 1 tablet by mouth Every 12 (Twelve) Hours., Disp: 60 tablet, Rfl: 2    arformoterol (BROVANA) 15 MCG/2ML nebulizer solution, Take 2 mL by nebulization 2 (Two) Times a Day., Disp: 360 mL, Rfl: 3    aspirin 81 MG EC tablet, Take 1 tablet by mouth Every Morning., Disp: 30 tablet, Rfl: 2    atorvastatin (LIPITOR) 20 MG tablet, Take 1 tablet by mouth Every Night., Disp: 30 tablet, Rfl: 2    budesonide (Pulmicort) 0.5 MG/2ML nebulizer solution, Take 2 mL by nebulization 2 (Two) Times a Day., Disp: 360 mL, Rfl: 3    budesonide-formoterol (Symbicort) 160-4.5 MCG/ACT  inhaler, Inhale 2 puffs., Disp: , Rfl:     bumetanide (BUMEX) 2 MG tablet, Take 1 tablet by mouth 2 (Two) Times a Day., Disp: 60 tablet, Rfl: 2    busPIRone (BUSPAR) 15 MG tablet, Take 1 tablet by mouth 2 (Two) Times a Day., Disp: 60 tablet, Rfl: 2    calcium citrate (CALCITRATE) 950 (200 Ca) MG tablet, Take 1 tablet by mouth Every Night., Disp: 30 tablet, Rfl: 2    carvedilol (COREG) 25 MG tablet, Take 1 tablet by mouth Every 12 (Twelve) Hours., Disp: 60 tablet, Rfl: 2    Cholecalciferol (Vitamin D3) 50 MCG (2000 UT) tablet, Take 1 tablet by mouth Every Morning., Disp: 30 tablet, Rfl: 2    dapagliflozin (Farxiga) 5 MG tablet tablet, Take 1 tablet by mouth Every Morning., Disp: 30 tablet, Rfl: 2    docusate sodium (COLACE) 100 MG capsule, Take 1 capsule by mouth Every Night., Disp: 30 capsule, Rfl: 2    DULoxetine (Cymbalta) 60 MG capsule, Take 1 capsule by mouth Daily., Disp: 90 capsule, Rfl: 1    famotidine (PEPCID) 40 MG tablet, Take 1 tablet by mouth Every Morning., Disp: 30 tablet, Rfl: 2    ferrous gluconate (FERGON) 324 MG tablet, Take 1 tablet by mouth Every Morning., Disp: 30 tablet, Rfl: 2    finasteride (PROSCAR) 5 MG tablet, Take 1 tablet by mouth Every Night., Disp: 30 tablet, Rfl: 2    folic acid (FOLVITE) 1 MG tablet, Take 1 tablet by mouth Every Night., Disp: 30 tablet, Rfl: 2    insulin detemir (Levemir) 100 UNIT/ML injection, Inject 10 Units under the skin into the appropriate area as directed Every Night., Disp: 15 mL, Rfl: 2    Insulin Lispro, 1 Unit Dial, (HUMALOG) 100 UNIT/ML solution pen-injector, inject EIGHT units under the skin INTO THE APPROPRIATE AREA THREE TIMES DAILY BEFORE meals PER sliding scale, Disp: 15 mL, Rfl: 0    ipratropium-albuterol (DUO-NEB) 0.5-2.5 mg/3 ml nebulizer, Take 3 mL by nebulization Every 4 (Four) Hours As Needed for Wheezing or Shortness of Air., Disp: 360 mL, Rfl: 5    Isopropyl Myristate solution, Apply 1 Application topically to the appropriate area as  "directed., Disp: , Rfl:     Ketoconazole 1 % shampoo, Apply 10 mL topically Every 3 (Three) Days., Disp: 200 mL, Rfl: 0    levETIRAcetam (KEPPRA) 500 MG tablet, Take 1 tablet by mouth 2 (Two) Times a Day., Disp: 60 tablet, Rfl: 2    magnesium oxide (MAG-OX) 400 tablet tablet, Take 1 tablet by mouth Every Night., Disp: 30 tablet, Rfl: 2    montelukast (SINGULAIR) 10 MG tablet, Take 1 tablet by mouth Every Night., Disp: 30 tablet, Rfl: 2    Multiple Vitamins-Iron (multivitamin with iron) tablet tablet, Take 1 tablet by mouth Every Morning., Disp: 30 tablet, Rfl: 2    NIFEdipine XL (PROCARDIA XL) 30 MG 24 hr tablet, Take 1 tablet by mouth Every Morning., Disp: 30 tablet, Rfl: 2    O2 (OXYGEN), 2 Liter O2 - CONTINUOUS (route: Oxygen), Disp: , Rfl:     ondansetron (ZOFRAN) 4 MG tablet, Take 1 tablet by mouth., Disp: , Rfl:     Semaglutide,0.25 or 0.5MG/DOS, (Ozempic, 0.25 or 0.5 MG/DOSE,) 2 MG/3ML solution pen-injector, Inject 0.25 mg under the skin into the appropriate area as directed 1 (One) Time Per Week., Disp: 3 mL, Rfl: 0    tamsulosin (FLOMAX) 0.4 MG capsule 24 hr capsule, , Disp: , Rfl:     traZODone (DESYREL) 50 MG tablet, Take 1 tablet by mouth Every Night., Disp: 30 tablet, Rfl: 2    vitamin C (ASCORBIC ACID) 500 MG tablet, Take 1 tablet by mouth 2 (Two) Times a Day., Disp: 60 tablet, Rfl: 2    Past Medical History:   Diagnosis Date    Age-related cognitive decline     Allergic contact dermatitis     Allergies     Anemia     Bedbound     11/2023 \"MY LEG MUSCLES STOPPED WORKING\"    Bronchiectasis with acute lower respiratory infection     Charcot foot due to diabetes mellitus 09/10/2013    Chronic diastolic (congestive) heart failure     Chronic kidney disease     Chronic respiratory failure with hypoxia     Closed supracondylar fracture of femur 01/12/2022    COPD (chronic obstructive pulmonary disease)     Deep vein thrombosis (DVT) of lower extremity associated with air travel 01/13/2023    Dependence on " supplemental oxygen     Eczema     Erectile dysfunction     due to organic reasons    Essential (primary) hypertension     Fracture     closed fracture of other tarsal and metatarsal bones    Fracture of proximal humerus 01/13/2023    GERD without esophagitis     High risk medication use     Hypercholesteremia     Hypomagnesemia     Infected stasis ulcer of left lower extremity 01/13/2023    Insomnia     Low back pain     Major depressive disorder     Morbid (severe) obesity due to excess calories     MRSA pneumonia     Muscle weakness     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 without evidence of necrosis     Obstructive sleep apnea (adult) (pediatric)     On home O2     REPORTS WEARING 2L/NC AAT    Other forms of dyspnea     Other long term (current) drug therapy     Other specified noninfective gastroenteritis and colitis     Other spondylosis, lumbar region     Pain in both knees     Paroxysmal atrial fibrillation     Peripheral neuropathy     attributed to type 2 diabetes    Pneumonia, unspecified organism     Polyneuropathy     Rash and other nonspecific skin eruption     Syncope and collapse     Tachycardia     Tinnitus 01/13/2023    Type 1 diabetes mellitus with diabetic chronic kidney disease     Type 2 diabetes mellitus     Unspecified fall, initial encounter     Urinary retention      Past Surgical History:   Procedure Laterality Date    CHOLECYSTECTOMY      CYSTOSCOPY      FEMUR SURGERY Left     Shravan placed    KNEE SURGERY Left     OTHER SURGICAL HISTORY Left     venous port, REMOVED    PORTACATH PLACEMENT Right     TIBIAL PLATEAU OPEN REDUCTION INTERNAL FIXATION Left 12/22/2023    Procedure: TIBIAL PLATEAU OPEN REDUCTION INTERNAL FIXATION;  Surgeon: Hugo Kline MD;  Location: [facility];  Service: Orthopedics;  Laterality: Left;    TONSILLECTOMY AND ADENOIDECTOMY       Social History     Socioeconomic History    Marital status:     Tobacco Use    Smoking status: Former     Current packs/day: 0.00     Average packs/day: 1 pack/day for 12.0 years (12.0 ttl pk-yrs)     Types: Cigarettes     Start date:      Quit date:      Years since quittin.5     Passive exposure: Past    Smokeless tobacco: Never   Vaping Use    Vaping status: Never Used   Substance and Sexual Activity    Alcohol use: Not Currently    Drug use: Never    Sexual activity: Defer           Objective     Vitals:    07/15/24 1322   BP: 130/56   BP Location: Left arm   Patient Position: Sitting   Cuff Size: Adult   Pulse: 69   Resp: 18  Comment: 98% 2L   Temp: 97.7 °F (36.5 °C)   TempSrc: Temporal   PainSc:   4   PainLoc: Foot     There is no height or weight on file to calculate BMI.    STEADI Fall Risk Assessment has not been completed.     Review of Systems     ROS:  Per HPI.     I have reviewed the HPI and ROS as documented by MA/RN. Katerin Torres MD    Physical Exam     NAD, chronically ill-appearing, on oxygen  AAOx3, pleasant, cooperative  Mild edema BLE   Palpable pedal pulses, faint due to edema  LLE: Large black eschar left plantar and posterior heel.  Eschar itself is elevating along the edges showing some healthier pink tissue underneath.  The underlying soft tissues no longer feel boggy.  No drainage, no erythema or heat.  Soft black eschar left plantar fifth MPJ is loosening and some of the areas are able to be peeled away.  There is some healthier pink underlying tissue and small areas of granulation tissue noted.  No surrounding erythema or warmth, no drainage present or expressed.  No tenderness of either wound due to severe DM neuropathy.  No evidence of acute infection.    See photos for details.    LLE:              Result Review :  The following data was reviewed by: Katerin Torres MD on 07/15/2024:    Prior wound images.  Vascular surgery note             Assessment and Plan   Diagnoses and all orders for this visit:    1. Pressure injury of  left heel, unstageable (Primary)    2. Pressure injury of left foot, unstageable    3. Type 2 diabetes mellitus with foot ulcer, with long-term current use of insulin    4. Chronic respiratory failure with hypoxia, on home O2 therapy    5. Chronic diastolic heart failure    6. Obstructive sleep apnea    7. Peripheral arterial disease      Patient with severe wounds that appear to be related to pressure and complicated by his severe neuropathy and diabetes in addition to his pulmonary disease, heart disease, kidneys disease, etc.    Recommend they continue using Betadine soaked gauze to the wounds.  This should be done 1-2 times daily and wounds should be kept covered at all times.    We have again discussed the importance of 100% offloading of these wounds to allow for healing and help prevent future amputations.    Patient is also advised to improve diabetic control and to eat a nutritious diet low in carbohydrates and with increased protein to aid healing.    We referred the patient to vascular surgery.  He is scheduled for additional testing and follow-up with Dr. Willson 07/29.    Recheck 4 weeks, sooner if symptoms change or worsen.    Patient was given instructions and counseling regarding their condition or for health maintenance advice, as well as the wound care plan and recommendations. They understand and agree with the plan.  They will follow back up here in the clinic but are instructed to contact us in the interim should they have any new, returning, or worsening symptoms or concerns. Please see specific information pulled into the AVS if appropriate.     Dragon Dictation utilized for chart completion.    I spent 33 minutes in both face-to-face and nonface-to-face time for patient care today including, but not limited to, review of old records, reviewing old images, history and physical exam, reviewing test results, counseling patient and family, coordinating care, and documenting.      Follow Up   Return  in about 4 weeks (around 8/12/2024).      Katerin Torres MD

## 2024-07-18 ENCOUNTER — OFFICE VISIT (OUTPATIENT)
Dept: PODIATRY | Facility: CLINIC | Age: 59
End: 2024-07-18
Payer: COMMERCIAL

## 2024-07-18 ENCOUNTER — LAB (OUTPATIENT)
Dept: LAB | Facility: HOSPITAL | Age: 59
End: 2024-07-18
Payer: COMMERCIAL

## 2024-07-18 VITALS
OXYGEN SATURATION: 99 % | HEIGHT: 69 IN | BODY MASS INDEX: 41.18 KG/M2 | WEIGHT: 278 LBS | HEART RATE: 66 BPM | DIASTOLIC BLOOD PRESSURE: 51 MMHG | SYSTOLIC BLOOD PRESSURE: 139 MMHG

## 2024-07-18 DIAGNOSIS — Z79.4 TYPE 2 DIABETES MELLITUS WITH HYPERGLYCEMIA, WITH LONG-TERM CURRENT USE OF INSULIN: ICD-10-CM

## 2024-07-18 DIAGNOSIS — N18.30 TYPE 2 DM WITH CKD STAGE 3 AND HYPERTENSION: ICD-10-CM

## 2024-07-18 DIAGNOSIS — Z12.5 PROSTATE CANCER SCREENING: ICD-10-CM

## 2024-07-18 DIAGNOSIS — D64.9 ANEMIA, UNSPECIFIED TYPE: ICD-10-CM

## 2024-07-18 DIAGNOSIS — D50.8 IRON DEFICIENCY ANEMIA SECONDARY TO INADEQUATE DIETARY IRON INTAKE: ICD-10-CM

## 2024-07-18 DIAGNOSIS — N18.31 CHRONIC KIDNEY DISEASE (CKD) STAGE G3A/A1, MODERATELY DECREASED GLOMERULAR FILTRATION RATE (GFR) BETWEEN 45-59 ML/MIN/1.73 SQUARE METER AND ALBUMINURIA CREATININE RATIO LESS THAN 30 MG/G (CMS/H*: ICD-10-CM

## 2024-07-18 DIAGNOSIS — E55.9 VITAMIN D DEFICIENCY: ICD-10-CM

## 2024-07-18 DIAGNOSIS — E11.22 TYPE 2 DM WITH CKD STAGE 3 AND HYPERTENSION: ICD-10-CM

## 2024-07-18 DIAGNOSIS — E11.40 TYPE 2 DIABETES MELLITUS WITH DIABETIC NEUROPATHY, WITH LONG-TERM CURRENT USE OF INSULIN: ICD-10-CM

## 2024-07-18 DIAGNOSIS — L97.529 ULCER OF FOOT, LEFT, WITH UNSPECIFIED SEVERITY: ICD-10-CM

## 2024-07-18 DIAGNOSIS — E11.65 TYPE 2 DIABETES MELLITUS WITH HYPERGLYCEMIA, WITH LONG-TERM CURRENT USE OF INSULIN: ICD-10-CM

## 2024-07-18 DIAGNOSIS — E78.2 MIXED HYPERLIPIDEMIA: ICD-10-CM

## 2024-07-18 DIAGNOSIS — N25.81 HYPERPARATHYROIDISM DUE TO RENAL INSUFFICIENCY: ICD-10-CM

## 2024-07-18 DIAGNOSIS — N18.32 STAGE 3B CHRONIC KIDNEY DISEASE (CKD): ICD-10-CM

## 2024-07-18 DIAGNOSIS — I12.9 TYPE 2 DM WITH CKD STAGE 3 AND HYPERTENSION: ICD-10-CM

## 2024-07-18 DIAGNOSIS — Z79.4 TYPE 2 DIABETES MELLITUS WITH DIABETIC NEUROPATHY, WITH LONG-TERM CURRENT USE OF INSULIN: ICD-10-CM

## 2024-07-18 DIAGNOSIS — G62.9 PERIPHERAL POLYNEUROPATHY: Primary | ICD-10-CM

## 2024-07-18 DIAGNOSIS — R44.3 HALLUCINATION: ICD-10-CM

## 2024-07-18 DIAGNOSIS — D50.9 IRON DEFICIENCY ANEMIA, UNSPECIFIED IRON DEFICIENCY ANEMIA TYPE: ICD-10-CM

## 2024-07-18 LAB
25(OH)D3 SERPL-MCNC: 41.4 NG/ML (ref 30–100)
ALBUMIN SERPL-MCNC: 3.7 G/DL (ref 3.5–5.2)
ALBUMIN/GLOB SERPL: 0.8 G/DL
ALP SERPL-CCNC: 196 U/L (ref 39–117)
ALT SERPL W P-5'-P-CCNC: 25 U/L (ref 1–41)
AMORPH URATE CRY URNS QL MICRO: NORMAL /HPF
ANION GAP SERPL CALCULATED.3IONS-SCNC: 12.2 MMOL/L (ref 5–15)
AST SERPL-CCNC: 27 U/L (ref 1–40)
BACTERIA UR QL AUTO: NORMAL /HPF
BASOPHILS # BLD AUTO: 0.05 10*3/MM3 (ref 0–0.2)
BASOPHILS NFR BLD AUTO: 0.4 % (ref 0–1.5)
BILIRUB SERPL-MCNC: <0.2 MG/DL (ref 0–1.2)
BILIRUB UR QL STRIP: NEGATIVE
BUN SERPL-MCNC: 37 MG/DL (ref 6–20)
BUN/CREAT SERPL: 18 (ref 7–25)
CALCIUM SPEC-SCNC: 9.5 MG/DL (ref 8.6–10.5)
CHLORIDE SERPL-SCNC: 93 MMOL/L (ref 98–107)
CHOLEST SERPL-MCNC: 109 MG/DL (ref 0–200)
CLARITY UR: ABNORMAL
CO2 SERPL-SCNC: 32.8 MMOL/L (ref 22–29)
COLOR UR: YELLOW
CREAT SERPL-MCNC: 2.06 MG/DL (ref 0.76–1.27)
CREAT UR-MCNC: 26.2 MG/DL
DEPRECATED RDW RBC AUTO: 47.1 FL (ref 37–54)
EGFRCR SERPLBLD CKD-EPI 2021: 36.4 ML/MIN/1.73
EOSINOPHIL # BLD AUTO: 0.49 10*3/MM3 (ref 0–0.4)
EOSINOPHIL NFR BLD AUTO: 4.1 % (ref 0.3–6.2)
ERYTHROCYTE [DISTWIDTH] IN BLOOD BY AUTOMATED COUNT: 13.9 % (ref 12.3–15.4)
FERRITIN SERPL-MCNC: 122 NG/ML (ref 30–400)
GLOBULIN UR ELPH-MCNC: 4.6 GM/DL
GLUCOSE SERPL-MCNC: 290 MG/DL (ref 65–99)
GLUCOSE UR STRIP-MCNC: ABNORMAL MG/DL
HBA1C MFR BLD: 8.3 % (ref 4.8–5.6)
HCT VFR BLD AUTO: 28.7 % (ref 37.5–51)
HDLC SERPL-MCNC: 36 MG/DL (ref 40–60)
HGB BLD-MCNC: 8.7 G/DL (ref 13–17.7)
HGB UR QL STRIP.AUTO: ABNORMAL
IMM GRANULOCYTES # BLD AUTO: 0.03 10*3/MM3 (ref 0–0.05)
IMM GRANULOCYTES NFR BLD AUTO: 0.3 % (ref 0–0.5)
IRON 24H UR-MRATE: 67 MCG/DL (ref 59–158)
IRON SATN MFR SERPL: 24 % (ref 20–50)
KETONES UR QL STRIP: NEGATIVE
LDLC SERPL CALC-MCNC: 52 MG/DL (ref 0–100)
LDLC/HDLC SERPL: 1.38 {RATIO}
LEUKOCYTE ESTERASE UR QL STRIP.AUTO: NEGATIVE
LYMPHOCYTES # BLD AUTO: 2.02 10*3/MM3 (ref 0.7–3.1)
LYMPHOCYTES NFR BLD AUTO: 17.1 % (ref 19.6–45.3)
MCH RBC QN AUTO: 27.6 PG (ref 26.6–33)
MCHC RBC AUTO-ENTMCNC: 30.3 G/DL (ref 31.5–35.7)
MCV RBC AUTO: 91.1 FL (ref 79–97)
MONOCYTES # BLD AUTO: 1 10*3/MM3 (ref 0.1–0.9)
MONOCYTES NFR BLD AUTO: 8.5 % (ref 5–12)
NEUTROPHILS NFR BLD AUTO: 69.6 % (ref 42.7–76)
NEUTROPHILS NFR BLD AUTO: 8.23 10*3/MM3 (ref 1.7–7)
NITRITE UR QL STRIP: NEGATIVE
PH UR STRIP.AUTO: <=5 [PH] (ref 5–8)
PHOSPHATE SERPL-MCNC: 4.4 MG/DL (ref 2.5–4.5)
PLATELET # BLD AUTO: 317 10*3/MM3 (ref 140–450)
PMV BLD AUTO: 8.3 FL (ref 6–12)
POTASSIUM SERPL-SCNC: 4.5 MMOL/L (ref 3.5–5.2)
PROT ?TM UR-MCNC: 116.4 MG/DL
PROT SERPL-MCNC: 8.3 G/DL (ref 6–8.5)
PROT UR QL STRIP: ABNORMAL
PROT/CREAT UR: 4.44 MG/G{CREAT}
PSA SERPL-MCNC: 0.21 NG/ML (ref 0–4)
PTH-INTACT SERPL-MCNC: 30.2 PG/ML (ref 15–65)
RBC # BLD AUTO: 3.15 10*6/MM3 (ref 4.14–5.8)
RBC # UR STRIP: NORMAL /HPF
REF LAB TEST METHOD: NORMAL
SODIUM SERPL-SCNC: 138 MMOL/L (ref 136–145)
SP GR UR STRIP: 1.02 (ref 1–1.03)
SQUAMOUS #/AREA URNS HPF: NORMAL /HPF
TIBC SERPL-MCNC: 280 MCG/DL (ref 298–536)
TRANSFERRIN SERPL-MCNC: 188 MG/DL (ref 200–360)
TRIGL SERPL-MCNC: 117 MG/DL (ref 0–150)
UROBILINOGEN UR QL STRIP: ABNORMAL
VLDLC SERPL-MCNC: 21 MG/DL (ref 5–40)
WBC # UR STRIP: NORMAL /HPF
WBC NRBC COR # BLD AUTO: 11.82 10*3/MM3 (ref 3.4–10.8)

## 2024-07-18 PROCEDURE — 83036 HEMOGLOBIN GLYCOSYLATED A1C: CPT

## 2024-07-18 PROCEDURE — 80053 COMPREHEN METABOLIC PANEL: CPT

## 2024-07-18 PROCEDURE — G0103 PSA SCREENING: HCPCS

## 2024-07-18 PROCEDURE — 1160F RVW MEDS BY RX/DR IN RCRD: CPT | Performed by: PODIATRIST

## 2024-07-18 PROCEDURE — 1159F MED LIST DOCD IN RCRD: CPT | Performed by: PODIATRIST

## 2024-07-18 PROCEDURE — 99213 OFFICE O/P EST LOW 20 MIN: CPT | Performed by: PODIATRIST

## 2024-07-18 PROCEDURE — 3075F SYST BP GE 130 - 139MM HG: CPT | Performed by: PODIATRIST

## 2024-07-18 PROCEDURE — 36415 COLL VENOUS BLD VENIPUNCTURE: CPT

## 2024-07-18 PROCEDURE — 83970 ASSAY OF PARATHORMONE: CPT

## 2024-07-18 PROCEDURE — 82728 ASSAY OF FERRITIN: CPT

## 2024-07-18 PROCEDURE — 84466 ASSAY OF TRANSFERRIN: CPT

## 2024-07-18 PROCEDURE — 81001 URINALYSIS AUTO W/SCOPE: CPT

## 2024-07-18 PROCEDURE — 84100 ASSAY OF PHOSPHORUS: CPT

## 2024-07-18 PROCEDURE — 84156 ASSAY OF PROTEIN URINE: CPT

## 2024-07-18 PROCEDURE — 82570 ASSAY OF URINE CREATININE: CPT

## 2024-07-18 PROCEDURE — 85025 COMPLETE CBC W/AUTO DIFF WBC: CPT

## 2024-07-18 PROCEDURE — 83540 ASSAY OF IRON: CPT

## 2024-07-18 PROCEDURE — 3078F DIAST BP <80 MM HG: CPT | Performed by: PODIATRIST

## 2024-07-18 PROCEDURE — 80061 LIPID PANEL: CPT

## 2024-07-18 PROCEDURE — 82306 VITAMIN D 25 HYDROXY: CPT

## 2024-07-18 RX ORDER — AMANTADINE HYDROCHLORIDE 100 MG/1
TABLET ORAL
COMMUNITY
Start: 2024-07-16

## 2024-07-21 DIAGNOSIS — F41.9 ANXIETY: ICD-10-CM

## 2024-07-21 DIAGNOSIS — G62.9 PERIPHERAL POLYNEUROPATHY: ICD-10-CM

## 2024-07-21 DIAGNOSIS — R13.10 DYSPHAGIA, UNSPECIFIED TYPE: ICD-10-CM

## 2024-07-21 DIAGNOSIS — I50.32 CHRONIC DIASTOLIC (CONGESTIVE) HEART FAILURE: ICD-10-CM

## 2024-07-21 DIAGNOSIS — J44.1 COPD EXACERBATION: ICD-10-CM

## 2024-07-21 DIAGNOSIS — K59.09 OTHER CONSTIPATION: ICD-10-CM

## 2024-07-21 DIAGNOSIS — I48.0 PAROXYSMAL ATRIAL FIBRILLATION: ICD-10-CM

## 2024-07-21 DIAGNOSIS — E78.2 MIXED HYPERLIPIDEMIA: ICD-10-CM

## 2024-07-21 DIAGNOSIS — E55.9 VITAMIN D DEFICIENCY: ICD-10-CM

## 2024-07-21 DIAGNOSIS — Z79.4 TYPE 2 DIABETES MELLITUS WITH HYPERGLYCEMIA, WITH LONG-TERM CURRENT USE OF INSULIN: ICD-10-CM

## 2024-07-21 DIAGNOSIS — E11.65 TYPE 2 DIABETES MELLITUS WITH HYPERGLYCEMIA, WITH LONG-TERM CURRENT USE OF INSULIN: ICD-10-CM

## 2024-07-21 DIAGNOSIS — N18.32 STAGE 3B CHRONIC KIDNEY DISEASE (CKD): ICD-10-CM

## 2024-07-21 DIAGNOSIS — G47.00 INSOMNIA, UNSPECIFIED TYPE: ICD-10-CM

## 2024-07-21 DIAGNOSIS — K21.9 GASTROESOPHAGEAL REFLUX DISEASE WITHOUT ESOPHAGITIS: ICD-10-CM

## 2024-07-21 DIAGNOSIS — L97.521 ULCER OF LEFT FOOT, LIMITED TO BREAKDOWN OF SKIN: ICD-10-CM

## 2024-07-21 DIAGNOSIS — D50.9 IRON DEFICIENCY ANEMIA, UNSPECIFIED IRON DEFICIENCY ANEMIA TYPE: ICD-10-CM

## 2024-07-21 DIAGNOSIS — R56.9 SEIZURES: ICD-10-CM

## 2024-07-22 ENCOUNTER — HOSPITAL ENCOUNTER (OUTPATIENT)
Dept: CT IMAGING | Facility: HOSPITAL | Age: 59
Discharge: HOME | End: 2024-07-22
Admitting: NURSE PRACTITIONER
Payer: COMMERCIAL

## 2024-07-22 DIAGNOSIS — J44.9 CHRONIC OBSTRUCTIVE PULMONARY DISEASE, UNSPECIFIED COPD TYPE: ICD-10-CM

## 2024-07-22 DIAGNOSIS — E66.01 CLASS 3 OBESITY: ICD-10-CM

## 2024-07-22 DIAGNOSIS — J47.9 BRONCHIECTASIS WITHOUT COMPLICATION: ICD-10-CM

## 2024-07-22 DIAGNOSIS — F17.201 TOBACCO ABUSE, IN REMISSION: ICD-10-CM

## 2024-07-22 DIAGNOSIS — G47.33 OBSTRUCTIVE SLEEP APNEA: ICD-10-CM

## 2024-07-22 DIAGNOSIS — Z86.711 PERSONAL HISTORY OF PE (PULMONARY EMBOLISM): ICD-10-CM

## 2024-07-22 DIAGNOSIS — J15.1 PNEUMONIA DUE TO PSEUDOMONAS SPECIES, UNSPECIFIED LATERALITY, UNSPECIFIED PART OF LUNG: ICD-10-CM

## 2024-07-22 DIAGNOSIS — R06.09 CHRONIC DYSPNEA: ICD-10-CM

## 2024-07-22 DIAGNOSIS — Z87.01 HISTORY OF PSEUDOMONAS PNEUMONIA: ICD-10-CM

## 2024-07-22 PROCEDURE — 71250 CT THORAX DX C-: CPT

## 2024-07-22 RX ORDER — BUSPIRONE HYDROCHLORIDE 15 MG/1
TABLET ORAL
Qty: 60 TABLET | Refills: 2 | OUTPATIENT
Start: 2024-07-22

## 2024-07-22 RX ORDER — ASPIRIN 81 MG/1
81 TABLET, COATED ORAL EVERY MORNING
Qty: 30 TABLET | Refills: 2 | OUTPATIENT
Start: 2024-07-22

## 2024-07-22 RX ORDER — FOLIC ACID 1 MG/1
1 TABLET ORAL NIGHTLY
Qty: 30 TABLET | Refills: 2 | OUTPATIENT
Start: 2024-07-22

## 2024-07-22 RX ORDER — LEVETIRACETAM 500 MG/1
TABLET ORAL
Qty: 60 TABLET | Refills: 2 | OUTPATIENT
Start: 2024-07-22

## 2024-07-22 RX ORDER — ATORVASTATIN CALCIUM 20 MG/1
20 TABLET, FILM COATED ORAL NIGHTLY
Qty: 30 TABLET | Refills: 2 | OUTPATIENT
Start: 2024-07-22

## 2024-07-22 RX ORDER — DOCUSATE SODIUM 100 MG/1
100 CAPSULE, LIQUID FILLED ORAL NIGHTLY
Qty: 30 CAPSULE | Refills: 2 | OUTPATIENT
Start: 2024-07-22

## 2024-07-22 RX ORDER — LANOLIN ALCOHOL/MO/W.PET/CERES
1 CREAM (GRAM) TOPICAL NIGHTLY
Qty: 30 TABLET | Refills: 2 | OUTPATIENT
Start: 2024-07-22

## 2024-07-22 RX ORDER — ASCORBIC ACID 500 MG
500 TABLET ORAL 2 TIMES DAILY
Qty: 60 TABLET | Refills: 2 | OUTPATIENT
Start: 2024-07-22

## 2024-07-22 RX ORDER — CARVEDILOL 25 MG/1
TABLET ORAL
Qty: 60 TABLET | Refills: 2 | OUTPATIENT
Start: 2024-07-22

## 2024-07-22 RX ORDER — FAMOTIDINE 40 MG/1
40 TABLET, FILM COATED ORAL EVERY MORNING
Qty: 30 TABLET | Refills: 2 | OUTPATIENT
Start: 2024-07-22

## 2024-07-22 RX ORDER — CHOLECALCIFEROL (VITAMIN D3) 50 MCG
1 TABLET ORAL EVERY MORNING
Qty: 30 TABLET | Refills: 2 | OUTPATIENT
Start: 2024-07-22

## 2024-07-22 RX ORDER — FERROUS GLUCONATE 324(37.5)
324 TABLET ORAL EVERY MORNING
Qty: 30 TABLET | Refills: 2 | OUTPATIENT
Start: 2024-07-22

## 2024-07-22 RX ORDER — IBUPROFEN 200 MG
950 CAPSULE ORAL NIGHTLY
Qty: 30 TABLET | Refills: 2 | OUTPATIENT
Start: 2024-07-22

## 2024-07-22 RX ORDER — NIFEDIPINE 30 MG/1
30 TABLET, EXTENDED RELEASE ORAL EVERY MORNING
Qty: 30 TABLET | Refills: 2 | OUTPATIENT
Start: 2024-07-22

## 2024-07-22 RX ORDER — MONTELUKAST SODIUM 10 MG/1
10 TABLET ORAL NIGHTLY
Qty: 30 TABLET | Refills: 2 | OUTPATIENT
Start: 2024-07-22

## 2024-07-22 RX ORDER — TRAZODONE HYDROCHLORIDE 50 MG/1
50 TABLET ORAL NIGHTLY
Qty: 30 TABLET | Refills: 2 | OUTPATIENT
Start: 2024-07-22

## 2024-07-22 RX ORDER — DULOXETIN HYDROCHLORIDE 30 MG/1
30 CAPSULE, DELAYED RELEASE ORAL EVERY MORNING
Qty: 30 CAPSULE | Refills: 2 | OUTPATIENT
Start: 2024-07-22

## 2024-07-22 RX ORDER — MULTIVITAMIN/IRON/FOLIC ACID 18MG-0.4MG
1 TABLET ORAL EVERY MORNING
Refills: 2 | OUTPATIENT
Start: 2024-07-22

## 2024-07-22 RX ORDER — DAPAGLIFLOZIN 5 MG/1
5 TABLET, FILM COATED ORAL EVERY MORNING
Qty: 30 TABLET | Refills: 2 | OUTPATIENT
Start: 2024-07-22

## 2024-07-22 RX ORDER — BUMETANIDE 2 MG/1
TABLET ORAL
Qty: 60 TABLET | Refills: 2 | OUTPATIENT
Start: 2024-07-22

## 2024-07-22 NOTE — PROGRESS NOTES
"    [facility] - PODIATRY    Today's Date: 07/22/24    Patient Name: Preston Wallis  MRN: [id number]  CSN: [id number]  PCP: Kimmy iRley MD,   Referring Provider: No ref. provider found    SUBJECTIVE     Chief Complaint   Patient presents with    Left Foot - Follow-up, Foot Ulcer     HPI: Preston Wallis, a 59 y.o.male, presents to clinic.    Patient is a 58-year-old male presenting with wounds on his left foot and heel.  Patient is diabetic and has history of Charcot arthropathy.  Patient was recently admitted due to multiple medical issues.  Patient states that he has had wounds on his feet for several months.  Patient says that he he does very minimal walking.    Past Medical History:   Diagnosis Date    Age-related cognitive decline     Allergic contact dermatitis     Allergies     Anemia     Bedbound     11/2023 \"MY LEG MUSCLES STOPPED WORKING\"    Bronchiectasis with acute lower respiratory infection     Charcot foot due to diabetes mellitus 09/10/2013    Chronic diastolic (congestive) heart failure     Chronic kidney disease     Chronic respiratory failure with hypoxia     Closed supracondylar fracture of femur 01/12/2022    COPD (chronic obstructive pulmonary disease)     Deep vein thrombosis (DVT) of lower extremity associated with air travel 01/13/2023    Dependence on supplemental oxygen     Eczema     Erectile dysfunction     due to organic reasons    Essential (primary) hypertension     Fracture     closed fracture of other tarsal and metatarsal bones    Fracture of proximal humerus 01/13/2023    GERD without esophagitis     High risk medication use     Hypercholesteremia     Hypomagnesemia     Infected stasis ulcer of left lower extremity 01/13/2023    Insomnia     Low back pain     Major depressive disorder     Morbid (severe) obesity due to excess calories     MRSA pneumonia     Muscle weakness     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 " without evidence of necrosis     Obstructive sleep apnea (adult) (pediatric)     On home O2     REPORTS WEARING 2L/NC AAT    Other forms of dyspnea     Other long term (current) drug therapy     Other specified noninfective gastroenteritis and colitis     Other spondylosis, lumbar region     Pain in both knees     Paroxysmal atrial fibrillation     Peripheral neuropathy     attributed to type 2 diabetes    Pneumonia, unspecified organism     Polyneuropathy     Rash and other nonspecific skin eruption     Syncope and collapse     Tachycardia     Tinnitus 2023    Type 1 diabetes mellitus with diabetic chronic kidney disease     Type 2 diabetes mellitus     Unspecified fall, initial encounter     Urinary retention      Past Surgical History:   Procedure Laterality Date    CHOLECYSTECTOMY      CYSTOSCOPY      FEMUR SURGERY Left     Shravan placed    KNEE SURGERY Left     OTHER SURGICAL HISTORY Left     venous port, REMOVED    PORTACATH PLACEMENT Right     TIBIAL PLATEAU OPEN REDUCTION INTERNAL FIXATION Left 2023    Procedure: TIBIAL PLATEAU OPEN REDUCTION INTERNAL FIXATION;  Surgeon: Hugo Kline MD;  Location: [facility];  Service: Orthopedics;  Laterality: Left;    TONSILLECTOMY AND ADENOIDECTOMY       Family History   Problem Relation Age of Onset    Coronary artery disease Mother     Hypertension Mother     Diabetes type II Mother     Asthma Father     Diabetes type II Sister     Cancer Sister      Social History     Socioeconomic History    Marital status:    Tobacco Use    Smoking status: Former     Current packs/day: 0.00     Average packs/day: 1 pack/day for 12.0 years (12.0 ttl pk-yrs)     Types: Cigarettes     Start date:      Quit date:      Years since quittin.5     Passive exposure: Past    Smokeless tobacco: Never   Vaping Use    Vaping status: Never Used   Substance and Sexual Activity    Alcohol use: Not Currently    Drug use: Never    Sexual activity: Defer      Allergies   Allergen Reactions    Benadryl [Diphenhydramine] Itching    Proventil [Albuterol] Other (See Comments)     Mouth sores       Current Outpatient Medications   Medication Sig Dispense Refill    albuterol (PROVENTIL) (2.5 MG/3ML) 0.083% nebulizer solution Inhale 2.5 mg.      amantadine (SYMMETREL) 100 MG tablet       apixaban (Eliquis) 5 MG tablet tablet Take 1 tablet by mouth Every 12 (Twelve) Hours. 60 tablet 2    arformoterol (BROVANA) 15 MCG/2ML nebulizer solution Take 2 mL by nebulization 2 (Two) Times a Day. 360 mL 3    aspirin 81 MG EC tablet Take 1 tablet by mouth Every Morning. 30 tablet 2    atorvastatin (LIPITOR) 20 MG tablet Take 1 tablet by mouth Every Night. 30 tablet 2    budesonide (Pulmicort) 0.5 MG/2ML nebulizer solution Take 2 mL by nebulization 2 (Two) Times a Day. 360 mL 3    budesonide-formoterol (Symbicort) 160-4.5 MCG/ACT inhaler Inhale 2 puffs.      bumetanide (BUMEX) 2 MG tablet Take 1 tablet by mouth 2 (Two) Times a Day. 60 tablet 2    busPIRone (BUSPAR) 15 MG tablet Take 1 tablet by mouth 2 (Two) Times a Day. 60 tablet 2    calcium citrate (CALCITRATE) 950 (200 Ca) MG tablet Take 1 tablet by mouth Every Night. 30 tablet 2    carvedilol (COREG) 25 MG tablet Take 1 tablet by mouth Every 12 (Twelve) Hours. 60 tablet 2    Cholecalciferol (Vitamin D3) 50 MCG (2000 UT) tablet Take 1 tablet by mouth Every Morning. 30 tablet 2    dapagliflozin (Farxiga) 5 MG tablet tablet Take 1 tablet by mouth Every Morning. 30 tablet 2    docusate sodium (COLACE) 100 MG capsule Take 1 capsule by mouth Every Night. 30 capsule 2    DULoxetine (Cymbalta) 60 MG capsule Take 1 capsule by mouth Daily. 90 capsule 1    famotidine (PEPCID) 40 MG tablet Take 1 tablet by mouth Every Morning. 30 tablet 2    ferrous gluconate (FERGON) 324 MG tablet Take 1 tablet by mouth Every Morning. 30 tablet 2    finasteride (PROSCAR) 5 MG tablet Take 1 tablet by mouth Every Night. 30 tablet 2    folic acid (FOLVITE) 1 MG  tablet Take 1 tablet by mouth Every Night. 30 tablet 2    insulin detemir (Levemir) 100 UNIT/ML injection Inject 10 Units under the skin into the appropriate area as directed Every Night. 15 mL 2    Insulin Lispro, 1 Unit Dial, (HUMALOG) 100 UNIT/ML solution pen-injector inject EIGHT units under the skin INTO THE APPROPRIATE AREA THREE TIMES DAILY BEFORE meals PER sliding scale 15 mL 0    ipratropium-albuterol (DUO-NEB) 0.5-2.5 mg/3 ml nebulizer Take 3 mL by nebulization Every 4 (Four) Hours As Needed for Wheezing or Shortness of Air. 360 mL 5    Isopropyl Myristate solution Apply 1 Application topically to the appropriate area as directed.      Ketoconazole 1 % shampoo Apply 10 mL topically Every 3 (Three) Days. 200 mL 0    levETIRAcetam (KEPPRA) 500 MG tablet Take 1 tablet by mouth 2 (Two) Times a Day. 60 tablet 2    magnesium oxide (MAG-OX) 400 tablet tablet Take 1 tablet by mouth Every Night. 30 tablet 2    montelukast (SINGULAIR) 10 MG tablet Take 1 tablet by mouth Every Night. 30 tablet 2    Multiple Vitamins-Iron (multivitamin with iron) tablet tablet Take 1 tablet by mouth Every Morning. 30 tablet 2    NIFEdipine XL (PROCARDIA XL) 30 MG 24 hr tablet Take 1 tablet by mouth Every Morning. 30 tablet 2    O2 (OXYGEN) 2 Liter O2 - CONTINUOUS (route: Oxygen)      ondansetron (ZOFRAN) 4 MG tablet Take 1 tablet by mouth.      Semaglutide,0.25 or 0.5MG/DOS, (Ozempic, 0.25 or 0.5 MG/DOSE,) 2 MG/3ML solution pen-injector Inject 0.25 mg under the skin into the appropriate area as directed 1 (One) Time Per Week. 3 mL 0    tamsulosin (FLOMAX) 0.4 MG capsule 24 hr capsule       traZODone (DESYREL) 50 MG tablet Take 1 tablet by mouth Every Night. 30 tablet 2    vitamin C (ASCORBIC ACID) 500 MG tablet Take 1 tablet by mouth 2 (Two) Times a Day. 60 tablet 2     No current facility-administered medications for this visit.     Review of Systems   Skin:         Ulcer feet   All other systems reviewed and are  negative.      OBJECTIVE     Vitals:    07/18/24 1326   BP: 139/51   Pulse: 66   SpO2: 99%       WBC   Date Value Ref Range Status   07/18/2024 11.82 (H) 3.40 - 10.80 10*3/mm3 Final   10/13/2020 10.79 4.80 - 10.80 10*3/uL Final   08/28/2020 9.47 4.5 - 11.0 10*3/uL Final     RBC   Date Value Ref Range Status   07/18/2024 3.15 (L) 4.14 - 5.80 10*6/mm3 Final   08/28/2020 3.31 (L) 4.5 - 5.9 10*6/uL Final     Hemoglobin   Date Value Ref Range Status   07/18/2024 8.7 (L) 13.0 - 17.7 g/dL Final   08/28/2020 9.0 (L) 13.5 - 17.5 g/dL Final     Hematocrit   Date Value Ref Range Status   07/18/2024 28.7 (L) 37.5 - 51.0 % Final   08/28/2020 28.8 (L) 41.0 - 53.0 % Final     MCV   Date Value Ref Range Status   07/18/2024 91.1 79.0 - 97.0 fL Final   08/28/2020 87.0 80.0 - 100.0 fL Final     MCH   Date Value Ref Range Status   07/18/2024 27.6 26.6 - 33.0 pg Final   08/28/2020 27.2 26.0 - 34.0 pg Final     MCHC   Date Value Ref Range Status   07/18/2024 30.3 (L) 31.5 - 35.7 g/dL Final   08/28/2020 31.3 31.0 - 37.0 g/dL Final     RDW   Date Value Ref Range Status   07/18/2024 13.9 12.3 - 15.4 % Final   08/28/2020 13.5 12.0 - 16.8 % Final     RDW-SD   Date Value Ref Range Status   07/18/2024 47.1 37.0 - 54.0 fl Final     MPV   Date Value Ref Range Status   07/18/2024 8.3 6.0 - 12.0 fL Final   08/28/2020 10.3 8.4 - 12.4 fL Final     Platelets   Date Value Ref Range Status   07/18/2024 317 140 - 450 10*3/mm3 Final   08/28/2020 177 140 - 440 10*3/uL Final     Neutrophil Rel %   Date Value Ref Range Status   08/28/2020 61.4 45 - 80 % Final     Neutrophil %   Date Value Ref Range Status   07/18/2024 69.6 42.7 - 76.0 % Final     Lymphocyte Rel %   Date Value Ref Range Status   08/28/2020 24.0 15 - 50 % Final     Lymphocyte %   Date Value Ref Range Status   07/18/2024 17.1 (L) 19.6 - 45.3 % Final     Monocyte Rel %   Date Value Ref Range Status   08/28/2020 9.7 0 - 15 % Final     Monocyte %   Date Value Ref Range Status   07/18/2024 8.5 5.0  - 12.0 % Final     Eosinophil %   Date Value Ref Range Status   07/18/2024 4.1 0.3 - 6.2 % Final   08/28/2020 3.4 0 - 7 % Final     Basophil Rel %   Date Value Ref Range Status   08/28/2020 0.5 0 - 2 % Final     Basophil %   Date Value Ref Range Status   07/18/2024 0.4 0.0 - 1.5 % Final     Immature Grans %   Date Value Ref Range Status   07/18/2024 0.3 0.0 - 0.5 % Final   08/28/2020 1.0 0.0 - 1.0 % Final     Neutrophils Absolute   Date Value Ref Range Status   12/22/2021 13.9 (H) 1.7 - 6.0 x10(3)/ul Final   08/28/2020 5.82 2.0 - 8.8 10*3/uL Final     Neutrophils, Absolute   Date Value Ref Range Status   07/18/2024 8.23 (H) 1.70 - 7.00 10*3/mm3 Final     Lymphocytes Absolute   Date Value Ref Range Status   08/28/2020 2.27 0.7 - 5.5 10*3/uL Final     Lymphocytes, Absolute   Date Value Ref Range Status   07/18/2024 2.02 0.70 - 3.10 10*3/mm3 Final     Monocytes Absolute   Date Value Ref Range Status   08/28/2020 0.92 0.0 - 1.7 10*3/uL Final     Monocytes, Absolute   Date Value Ref Range Status   07/18/2024 1.00 (H) 0.10 - 0.90 10*3/mm3 Final     Eosinophils Absolute   Date Value Ref Range Status   12/22/2021 0.1 0.0 - 0.6 x10(3)/ul Final   08/28/2020 0.32 0.0 - 0.8 10*3/uL Final     Eosinophils, Absolute   Date Value Ref Range Status   07/18/2024 0.49 (H) 0.00 - 0.40 10*3/mm3 Final     Basophils Absolute   Date Value Ref Range Status   12/22/2021 0.1 0.0 - 0.3 x10(3)/ul Final   08/28/2020 0.05 0.0 - 0.2 10*3/uL Final     Basophils, Absolute   Date Value Ref Range Status   07/18/2024 0.05 0.00 - 0.20 10*3/mm3 Final     Immature Grans, Absolute   Date Value Ref Range Status   07/18/2024 0.03 0.00 - 0.05 10*3/mm3 Final   08/28/2020 0.09 0.00 - 0.10 10*3/uL Final     nRBC   Date Value Ref Range Status   05/03/2024 0.0 0.0 - 0.2 /100 WBC Final         Lab Results   Component Value Date    GLUCOSE 290 (H) 07/18/2024    BUN 37 (H) 07/18/2024    CREATININE 2.06 (H) 07/18/2024    EGFRIFNONA 46 (L) 07/27/2021    BCR 18.0  07/18/2024    K 4.5 07/18/2024    CO2 32.8 (H) 07/18/2024    CALCIUM 9.5 07/18/2024    PROTENTOTREF 7.1 08/22/2022    ALBUMIN 3.7 07/18/2024    LABIL2 0.8 08/22/2022    AST 27 07/18/2024    ALT 25 07/18/2024       Patient seen in no apparent distress.      PHYSICAL EXAM:     Foot/Ankle Exam    GENERAL  Appearance:  appears stated age  Orientation:  AAOx3  Affect:  appropriate  Gait:  unimpaired  Assistance:  independent  Right shoe gear: casual shoe  Left shoe gear: casual shoe    VASCULAR     Right Foot Vascularity   Normal vascular exam    Dorsalis pedis:  2+  Posterior tibial:  2+  Skin temperature:  warm  Edema grading:  None  CFT:  < 3 seconds  Pedal hair growth:  Present  Varicosities:  none     Left Foot Vascularity   Normal vascular exam    Dorsalis pedis:  2+  Posterior tibial:  2+  Skin temperature:  warm  Edema grading:  None  CFT:  < 3 seconds  Pedal hair growth:  Present  Varicosities:  none     NEUROLOGIC     Right Foot Neurologic   Light touch sensation: absent  Vibratory sensation: absent  Hot/Cold sensation: absent  Protective Sensation using Colliers-Deshaun Monofilament:   Sites intact: 10  Sites tested: 10     Left Foot Neurologic   Light touch sensation: absent  Vibratory sensation: absent  Hot/Cold sensation:  absent  Protective Sensation using Colliers-Deshaun Monofilament:   Sites intact: 10  Sites tested: 10    MUSCLE STRENGTH     Right Foot Muscle Strength   Foot dorsiflexion:  2  Foot plantar flexion:  2  Foot inversion:  2  Foot eversion:  2     Left Foot Muscle Strength   Foot dorsiflexion:  2  Foot plantar flexion:  2  Foot inversion:  2  Foot eversion:  2    RANGE OF MOTION     Right Foot Range of Motion   Foot and ankle ROM within normal limits       Left Foot Range of Motion   Foot and ankle ROM within normal limits      DERMATOLOGIC      Right Foot Dermatologic   Skin  Right foot skin is intact.      Left Foot Dermatologic   Skin  Left foot skin is intact.      Left foot additional  comments: Dry eschar to left heel and subfifth MPJ.  No erythema no malodor no drainage.      RADIOLOGY:        No results found.    ASSESSMENT/PLAN     Diagnoses and all orders for this visit:    1. Peripheral polyneuropathy (Primary)    2. Ulcer of foot, left, with unspecified severity    3. Type 2 diabetes mellitus with hyperglycemia, with long-term current use of insulin          Local wound care, daily dressing changes  Offload heels and foot at all times when in the bed in wheelchair  Patient at risk for infection and amputation due to comorbidities and history of ulcerations    Comprehensive lower extremity examination and evaluation was performed.    Discussed findings and treatment plan including risks, benefits, and treatment options with patient in detail. Patient agreed with treatment plan.    Medications and allergies reviewed.  Reviewed available lab values along with other pertinent labs.  These were discussed with the patient.    An After Visit Summary was printed and given to the patient at discharge, including (if requested) any available informative/educational handouts regarding diagnosis, treatment, or medications. All questions were answered to patient/family satisfaction. Should symptoms fail to improve or worsen they agree to call or return to clinic or to go to the Emergency Department. Discussed the importance of following up with any needed screening tests/labs/specialist appointments and any requested follow-up recommended by me today. Importance of maintaining follow-up discussed and patient accepts that missed appointments can delay diagnosis and potentially lead to worsening of conditions.    No follow-ups on file., or sooner if acute issues arise.    This document has been electronically signed by Jordon Fuentes DPM on July 22, 2024 15:28 EDT

## 2024-07-23 ENCOUNTER — PATIENT OUTREACH (OUTPATIENT)
Dept: CASE MANAGEMENT | Facility: OTHER | Age: 59
End: 2024-07-23
Payer: COMMERCIAL

## 2024-07-23 DIAGNOSIS — N18.31 STAGE 3A CHRONIC KIDNEY DISEASE: Primary | ICD-10-CM

## 2024-07-23 DIAGNOSIS — D64.9 ANEMIA, UNSPECIFIED TYPE: Primary | ICD-10-CM

## 2024-07-23 NOTE — OUTREACH NOTE
"AMBULATORY CASE MANAGEMENT NOTE    Names and Relationships of Patient/Support Persons: Contact: Preston Wallis \"Gómez\"; Relationship: Self  Contact: nephrology office; Relationship:   Contact: Preston Wallis \"Gómez\"; Relationship: Self -     Attempted to call diabetes management to get patient rescheduled.  Offered appointment Friday, but Gómez declines.  He has an appointment on Thursday and Monday and it would be too taxing for him to do all 3.  Called [facility] to inquire why Ozempic not filled, the particular pharmacy does not fill any Glp1 drugs.  The prescription would need to be sent to [facility].  Patients wife notified.    She reports that he is not feeling well, no specific complaints.   He has been out of his GLP1 meds for several months, possibly due to increase blood sugar.    Will attempt to get his nephrology appointment rescheduled.  PCP would like for patient to complete a FIT test, patient wife notified.    Referral made to hematology also, previous patient.   Outreach created for follow up.    Kami asked about the CT scan, not read yet.     Tara GOMEZ  Ambulatory Case Management    7/23/2024, 12:08 EDT  "

## 2024-07-24 PROCEDURE — 82274 ASSAY TEST FOR BLOOD FECAL: CPT

## 2024-07-25 ENCOUNTER — LAB (OUTPATIENT)
Dept: LAB | Facility: HOSPITAL | Age: 59
End: 2024-07-25
Payer: COMMERCIAL

## 2024-07-25 DIAGNOSIS — D64.9 ANEMIA, UNSPECIFIED TYPE: ICD-10-CM

## 2024-07-25 LAB — HEMOCCULT STL QL IA: NEGATIVE

## 2024-07-25 RX ORDER — SEMAGLUTIDE 0.68 MG/ML
0.25 INJECTION, SOLUTION SUBCUTANEOUS WEEKLY
Qty: 3 ML | Refills: 0 | Status: SHIPPED | OUTPATIENT
Start: 2024-07-25 | End: 2024-07-26 | Stop reason: SDUPTHER

## 2024-07-25 RX ORDER — SEMAGLUTIDE 0.68 MG/ML
0.25 INJECTION, SOLUTION SUBCUTANEOUS WEEKLY
Qty: 3 ML | Refills: 0 | Status: SHIPPED | OUTPATIENT
Start: 2024-07-25 | End: 2024-07-25

## 2024-07-26 ENCOUNTER — TELEPHONE (OUTPATIENT)
Dept: PULMONOLOGY | Facility: CLINIC | Age: 59
End: 2024-07-26
Payer: COMMERCIAL

## 2024-07-26 DIAGNOSIS — E11.65 TYPE 2 DIABETES MELLITUS WITH HYPERGLYCEMIA, WITH LONG-TERM CURRENT USE OF INSULIN: Primary | ICD-10-CM

## 2024-07-26 DIAGNOSIS — Z79.4 TYPE 2 DIABETES MELLITUS WITH HYPERGLYCEMIA, WITH LONG-TERM CURRENT USE OF INSULIN: Primary | ICD-10-CM

## 2024-07-26 DIAGNOSIS — J15.1 PNEUMONIA DUE TO PSEUDOMONAS SPECIES, UNSPECIFIED LATERALITY, UNSPECIFIED PART OF LUNG: Primary | ICD-10-CM

## 2024-07-26 RX ORDER — TOBRAMYCIN INHALATION 300 MG/4ML
4 SOLUTION RESPIRATORY (INHALATION) DAILY
Qty: 120 ML | Refills: 5 | Status: SHIPPED | OUTPATIENT
Start: 2024-07-26

## 2024-07-26 RX ORDER — SEMAGLUTIDE 0.68 MG/ML
0.25 INJECTION, SOLUTION SUBCUTANEOUS WEEKLY
Qty: 3 ML | Refills: 0 | Status: SHIPPED | OUTPATIENT
Start: 2024-07-26

## 2024-07-26 RX ORDER — TOBRAMYCIN INHALATION 300 MG/4ML
4 SOLUTION RESPIRATORY (INHALATION) DAILY
Qty: 120 ML | Refills: 5 | Status: SHIPPED | OUTPATIENT
Start: 2024-07-26 | End: 2024-07-26

## 2024-07-26 NOTE — TELEPHONE ENCOUNTER
I received a secure chat from a nurse stating that Mr Wallis does not have his tobramycin inhaler, the last office note from 5/17 states to continue it, but I do not see it on his med list.

## 2024-07-29 ENCOUNTER — TELEPHONE (OUTPATIENT)
Dept: PULMONOLOGY | Facility: CLINIC | Age: 59
End: 2024-07-29
Payer: COMMERCIAL

## 2024-07-29 ENCOUNTER — OFFICE VISIT (OUTPATIENT)
Dept: VASCULAR SURGERY | Facility: HOSPITAL | Age: 59
End: 2024-07-29
Payer: COMMERCIAL

## 2024-07-29 ENCOUNTER — TELEPHONE (OUTPATIENT)
Dept: FAMILY MEDICINE CLINIC | Age: 59
End: 2024-07-29
Payer: COMMERCIAL

## 2024-07-29 ENCOUNTER — PATIENT OUTREACH (OUTPATIENT)
Dept: CASE MANAGEMENT | Facility: OTHER | Age: 59
End: 2024-07-29
Payer: COMMERCIAL

## 2024-07-29 ENCOUNTER — HOSPITAL ENCOUNTER (OUTPATIENT)
Dept: CARDIOLOGY | Facility: HOSPITAL | Age: 59
Discharge: HOME | End: 2024-07-29
Payer: COMMERCIAL

## 2024-07-29 VITALS
TEMPERATURE: 97.4 F | HEART RATE: 67 BPM | RESPIRATION RATE: 18 BRPM | SYSTOLIC BLOOD PRESSURE: 137 MMHG | OXYGEN SATURATION: 97 % | DIASTOLIC BLOOD PRESSURE: 64 MMHG

## 2024-07-29 DIAGNOSIS — E11.65 TYPE 2 DIABETES MELLITUS WITH HYPERGLYCEMIA, WITH LONG-TERM CURRENT USE OF INSULIN: ICD-10-CM

## 2024-07-29 DIAGNOSIS — R06.09 CHRONIC DYSPNEA: ICD-10-CM

## 2024-07-29 DIAGNOSIS — I50.32 CHRONIC DIASTOLIC (CONGESTIVE) HEART FAILURE: ICD-10-CM

## 2024-07-29 DIAGNOSIS — N40.1 BENIGN PROSTATIC HYPERPLASIA WITH URINARY RETENTION: ICD-10-CM

## 2024-07-29 DIAGNOSIS — J44.1 COPD EXACERBATION: ICD-10-CM

## 2024-07-29 DIAGNOSIS — N18.32 STAGE 3B CHRONIC KIDNEY DISEASE (CKD): ICD-10-CM

## 2024-07-29 DIAGNOSIS — F41.9 ANXIETY: ICD-10-CM

## 2024-07-29 DIAGNOSIS — Z79.4 TYPE 2 DIABETES MELLITUS WITH HYPERGLYCEMIA, WITH LONG-TERM CURRENT USE OF INSULIN: ICD-10-CM

## 2024-07-29 DIAGNOSIS — K21.9 GASTROESOPHAGEAL REFLUX DISEASE WITHOUT ESOPHAGITIS: ICD-10-CM

## 2024-07-29 DIAGNOSIS — I70.25 ATHEROSCLEROSIS OF NATIVE ARTERIES OF THE EXTREMITIES WITH ULCERATION: Primary | ICD-10-CM

## 2024-07-29 DIAGNOSIS — R05.3 CHRONIC COUGH: ICD-10-CM

## 2024-07-29 DIAGNOSIS — Z74.09 IMPAIRED MOBILITY AND ENDURANCE: ICD-10-CM

## 2024-07-29 DIAGNOSIS — K59.09 OTHER CONSTIPATION: ICD-10-CM

## 2024-07-29 DIAGNOSIS — R33.8 BENIGN PROSTATIC HYPERPLASIA WITH URINARY RETENTION: ICD-10-CM

## 2024-07-29 DIAGNOSIS — I70.25 ATHEROSCLEROSIS OF NATIVE ARTERIES OF THE EXTREMITIES WITH ULCERATION: ICD-10-CM

## 2024-07-29 DIAGNOSIS — I48.0 PAROXYSMAL ATRIAL FIBRILLATION: ICD-10-CM

## 2024-07-29 DIAGNOSIS — J47.9 BRONCHIECTASIS WITHOUT COMPLICATION: ICD-10-CM

## 2024-07-29 DIAGNOSIS — N18.31 STAGE 3A CHRONIC KIDNEY DISEASE: Primary | ICD-10-CM

## 2024-07-29 DIAGNOSIS — E78.2 MIXED HYPERLIPIDEMIA: ICD-10-CM

## 2024-07-29 DIAGNOSIS — J15.1 PNEUMONIA DUE TO PSEUDOMONAS SPECIES, UNSPECIFIED LATERALITY, UNSPECIFIED PART OF LUNG: Primary | ICD-10-CM

## 2024-07-29 DIAGNOSIS — R56.9 SEIZURES: ICD-10-CM

## 2024-07-29 DIAGNOSIS — Z86.711 PERSONAL HISTORY OF PE (PULMONARY EMBOLISM): ICD-10-CM

## 2024-07-29 DIAGNOSIS — L97.521 ULCER OF LEFT FOOT, LIMITED TO BREAKDOWN OF SKIN: ICD-10-CM

## 2024-07-29 DIAGNOSIS — D50.9 IRON DEFICIENCY ANEMIA, UNSPECIFIED IRON DEFICIENCY ANEMIA TYPE: ICD-10-CM

## 2024-07-29 DIAGNOSIS — G47.00 INSOMNIA, UNSPECIFIED TYPE: ICD-10-CM

## 2024-07-29 DIAGNOSIS — E55.9 VITAMIN D DEFICIENCY: ICD-10-CM

## 2024-07-29 DIAGNOSIS — J96.22 ACUTE ON CHRONIC RESPIRATORY FAILURE WITH HYPERCAPNIA: ICD-10-CM

## 2024-07-29 DIAGNOSIS — J44.9 CHRONIC OBSTRUCTIVE PULMONARY DISEASE, UNSPECIFIED COPD TYPE: ICD-10-CM

## 2024-07-29 DIAGNOSIS — R13.10 DYSPHAGIA, UNSPECIFIED TYPE: ICD-10-CM

## 2024-07-29 PROCEDURE — 1159F MED LIST DOCD IN RCRD: CPT | Performed by: SURGERY

## 2024-07-29 PROCEDURE — G0463 HOSPITAL OUTPT CLINIC VISIT: HCPCS | Performed by: SURGERY

## 2024-07-29 PROCEDURE — 99213 OFFICE O/P EST LOW 20 MIN: CPT | Performed by: SURGERY

## 2024-07-29 PROCEDURE — 3078F DIAST BP <80 MM HG: CPT | Performed by: SURGERY

## 2024-07-29 PROCEDURE — 1160F RVW MEDS BY RX/DR IN RCRD: CPT | Performed by: SURGERY

## 2024-07-29 PROCEDURE — 3075F SYST BP GE 130 - 139MM HG: CPT | Performed by: SURGERY

## 2024-07-29 RX ORDER — FOLIC ACID 1 MG/1
1 TABLET ORAL NIGHTLY
Qty: 30 TABLET | Refills: 3 | Status: SHIPPED | OUTPATIENT
Start: 2024-07-29

## 2024-07-29 RX ORDER — FINASTERIDE 5 MG/1
5 TABLET, FILM COATED ORAL NIGHTLY
Qty: 30 TABLET | Refills: 3 | Status: SHIPPED | OUTPATIENT
Start: 2024-07-29

## 2024-07-29 RX ORDER — DOCUSATE SODIUM 100 MG/1
100 CAPSULE, LIQUID FILLED ORAL 2 TIMES DAILY
Qty: 60 CAPSULE | Refills: 3 | Status: SHIPPED | OUTPATIENT
Start: 2024-07-29

## 2024-07-29 RX ORDER — IBUPROFEN 200 MG
950 CAPSULE ORAL NIGHTLY
Qty: 30 TABLET | Refills: 3 | Status: SHIPPED | OUTPATIENT
Start: 2024-07-29

## 2024-07-29 RX ORDER — NIFEDIPINE 30 MG/1
30 TABLET, EXTENDED RELEASE ORAL EVERY MORNING
Qty: 30 TABLET | Refills: 3 | Status: SHIPPED | OUTPATIENT
Start: 2024-07-29

## 2024-07-29 RX ORDER — LEVETIRACETAM 500 MG/1
500 TABLET ORAL 2 TIMES DAILY
Qty: 60 TABLET | Refills: 3 | Status: SHIPPED | OUTPATIENT
Start: 2024-07-29

## 2024-07-29 RX ORDER — BUMETANIDE 2 MG/1
2 TABLET ORAL 2 TIMES DAILY
Qty: 60 TABLET | Refills: 3 | Status: SHIPPED | OUTPATIENT
Start: 2024-07-29

## 2024-07-29 RX ORDER — CARVEDILOL 25 MG/1
25 TABLET ORAL EVERY 12 HOURS SCHEDULED
Qty: 60 TABLET | Refills: 3 | Status: SHIPPED | OUTPATIENT
Start: 2024-07-29

## 2024-07-29 RX ORDER — FAMOTIDINE 40 MG/1
40 TABLET, FILM COATED ORAL EVERY MORNING
Qty: 30 TABLET | Refills: 3 | Status: SHIPPED | OUTPATIENT
Start: 2024-07-29

## 2024-07-29 RX ORDER — FERROUS GLUCONATE 324(38)MG
324 TABLET ORAL EVERY MORNING
Qty: 30 TABLET | Refills: 3 | Status: SHIPPED | OUTPATIENT
Start: 2024-07-29

## 2024-07-29 RX ORDER — MONTELUKAST SODIUM 10 MG/1
10 TABLET ORAL NIGHTLY
Qty: 30 TABLET | Refills: 3 | Status: SHIPPED | OUTPATIENT
Start: 2024-07-29

## 2024-07-29 RX ORDER — BUSPIRONE HYDROCHLORIDE 15 MG/1
15 TABLET ORAL 2 TIMES DAILY
Qty: 60 TABLET | Refills: 3 | Status: SHIPPED | OUTPATIENT
Start: 2024-07-29

## 2024-07-29 RX ORDER — TAMSULOSIN HYDROCHLORIDE 0.4 MG/1
0.4 CAPSULE ORAL NIGHTLY
Qty: 30 CAPSULE | Refills: 3 | Status: SHIPPED | OUTPATIENT
Start: 2024-07-29

## 2024-07-29 RX ORDER — MULTIVIT/IRON SULF/FOLIC ACID 15MG-0.4MG
1 TABLET ORAL EVERY MORNING
Qty: 30 TABLET | Refills: 3 | Status: SHIPPED | OUTPATIENT
Start: 2024-07-29

## 2024-07-29 RX ORDER — CHOLECALCIFEROL (VITAMIN D3) 50 MCG
1 TABLET ORAL EVERY MORNING
Qty: 30 TABLET | Refills: 3 | Status: SHIPPED | OUTPATIENT
Start: 2024-07-29

## 2024-07-29 RX ORDER — DAPAGLIFLOZIN 5 MG/1
5 TABLET, FILM COATED ORAL EVERY MORNING
Qty: 30 TABLET | Refills: 3 | Status: SHIPPED | OUTPATIENT
Start: 2024-07-29

## 2024-07-29 RX ORDER — ATORVASTATIN CALCIUM 20 MG/1
20 TABLET, FILM COATED ORAL NIGHTLY
Qty: 30 TABLET | Refills: 3 | Status: SHIPPED | OUTPATIENT
Start: 2024-07-29

## 2024-07-29 RX ORDER — ASCORBIC ACID 500 MG
500 TABLET ORAL 2 TIMES DAILY
Qty: 60 TABLET | Refills: 3 | Status: SHIPPED | OUTPATIENT
Start: 2024-07-29

## 2024-07-29 RX ORDER — TRAZODONE HYDROCHLORIDE 50 MG/1
50 TABLET ORAL NIGHTLY
Qty: 30 TABLET | Refills: 3 | Status: SHIPPED | OUTPATIENT
Start: 2024-07-29

## 2024-07-29 RX ORDER — ASPIRIN 81 MG/1
81 TABLET ORAL EVERY MORNING
Qty: 30 TABLET | Refills: 3 | Status: SHIPPED | OUTPATIENT
Start: 2024-07-29

## 2024-07-29 NOTE — PROGRESS NOTES
[facility]   Follow up Office    Patient Name: Preston Wallis  : 1965  MRN: [id number]  Primary Care Physician:  Kimmy Riley MD      Subjective   Subjective     HPI:    Preston Wallis is a 59 y.o. male who has developed a left heel ulcer after he had a fracture and was in rehab.  He has been undergoing wound care at the wound center.  He has also been followed by podiatry.  Previous studies demonstrated satisfactory waveforms but noncompressible vessels.  He comes to see us because he has failed to make significant progress.      Objective     Vitals:   Temp:  [97.4 °F (36.3 °C)] 97.4 °F (36.3 °C)  Heart Rate:  [67] 67  Resp:  [18] 18  BP: (137)/(64) 137/64    Physical Exam      General: Alert, no acute distress.  HEENT: PERRLA  Abdomen: Benign  Extremities: Symmetric.    Left foot: Dressed.  Photographs of the left foot from 7/15/2024 demonstrates a large eschar in the heel.  Pulses: Unable to palpate left pedal pulses.  Neuro: No gross deficits    Diagnostic studies: The patient was to have a duplex today but unfortunately he is not able to stand and he could not get on the exam table.  His arterial Doppler from 2024 demonstrates noncompressible vessels.  Waveforms are multiphasic throughout.    Assessment & Plan   Assessment / Plan     Diagnoses and all orders for this visit:    1. Atherosclerosis of native arteries of the extremities with ulceration (Primary)  -     XR foot 3+ vw left; Future       Assessment/Plan:   Mr. Wallis has a large left heel eschar.  No previous evaluation has been done of these to determine whether there is evidence of infection or affectation of the bone.  I am recommending that we obtain plain films.  If no clear evidence of infection, then I will recommend that we proceed to angiography.  He has renal insufficiency and a CTA is not an option.  He will call us to let us know when the films have been done.  The plan will be to do carbon dioxide study understanding  that contrast will be likely necessary as well.  The goal will be to minimize the amount of contrast used.  I have discussed with the patient in detail the mechanics of the procedure, the indications, benefits, risks, alternatives as well as potential complications to include but not limited to infection, bleeding, inability to cross the occlusion, vascular injury requiring surgical repair, renal failure.  He appears to understand and desires to proceed.        Electronically signed by Moshe Willson MD, 07/29/24, 10:16 AM EDT.

## 2024-07-29 NOTE — TELEPHONE ENCOUNTER
Tara RN states pt doesn't walk so a walk isnt going to work.  Im not sure of another option. Please advise

## 2024-07-29 NOTE — TELEPHONE ENCOUNTER
I have reached out to [facility] to see if pt qualifies for a POC with what they have on him now.  Waiting on a response.

## 2024-07-29 NOTE — TELEPHONE ENCOUNTER
PA has been submitted for Ozempic   Key: [id number]    Approved MEDICATIONS - SCAN - pa, mediact, [id number] (07/29/2024)

## 2024-07-29 NOTE — OUTREACH NOTE
AMBULATORY CASE MANAGEMENT NOTE    Names and Relationships of Patient/Support Persons: Contact: Kami Wallis; Relationship: Emergency Contact -     Kami called wanting to know if Gómez could get a portable oxygen concentrator.  Will reach out to pulmonology to see if they can arrange.     Pended all refills for [facility] for pharmacy.   Changed stool softner to twice daily per Elizabeth request.  She is stating that they are having to give enemas.      Tara R  Ambulatory Case Management    7/29/2024, 14:09 EDT  [facility] End of Month Documentation    This Chronic Medical Management Care Plan for Preston Wallis, 59 y.o. male, has been monitored and managed; reviewed and a new plan of care implemented for the month of July.  A cumulative time of 103 minutes was spent on this patient record this month, including phone call with care giver; electronic communication with primary care provider; electronic communication with pharmacist; chart review; phone call with patient.    Regarding the patient's problems: has Pneumonia due to COVID-19 virus; Polyneuropathy; Paroxysmal atrial fibrillation; Obstructive sleep apnea; MRSA pneumonia; Low back pain; Chronic diastolic heart failure; Allergies; COPD exacerbation; Chronic anticoagulation; Benign prostatic hyperplasia; Difficulty using continuous positive airway pressure (CPAP) device; Stage 3a chronic kidney disease; Iron deficiency anemia secondary to inadequate dietary iron intake; Vitamin D deficiency; Class 3 severe obesity with serious comorbidity in adult; Lower extremity edema; Elevated alkaline phosphatase level; Venous insufficiency (chronic) (peripheral); Tobacco abuse, in remission; History of Pseudomonas pneumonia; Chronic dyspnea; Gastroesophageal reflux disease; Bronchiectasis without complication; ERIC (acute kidney injury); Altered mental status; Hyperlipidemia; Luetscher's syndrome; Neurogenic bladder; Class 1 obesity; Pneumonia of both lungs due to Pseudomonas  species; Seizures; Primary osteoarthritis of left knee; Other constipation; Chronic obstructive pulmonary disease; Type 2 diabetes mellitus with hyperglycemia, with long-term current use of insulin; Essential hypertension; Stage 3b chronic kidney disease; Annual physical exam; Long-term use of high-risk medication; Personal history of PE (pulmonary embolism); Encounter for aftercare for healing closed traumatic fracture of left femur; Chronic pain of left knee; Primary osteoarthritis of right knee; Sepsis; Bronchiectasis with acute exacerbation; Bacterial pneumonia; Anemia; Closed fracture of left tibial plateau; Septic joint; Septic arthritis; Skin ulcer of left heel, limited to breakdown of skin; Ulcer of left foot, limited to breakdown of skin; Left foot pain; Wheezing; Acute on chronic respiratory failure with hypercapnia; Chronic respiratory failure with hypoxia and hypercapnia; Acute hypoxic on chronic hypercapnic respiratory failure; and Impaired cognition on their problem list., the following items were addressed: medications; transitions to medical care; medical records; referrals to community service providers and any changes can be found within the plan section of the note.  A detailed listing of time spent for chronic care management is tracked within each outreach encounter.  Current medications include:  has a current medication list which includes the following prescription(s): apixaban, aspirin, atorvastatin, bumetanide, buspirone, calcium citrate, carvedilol, vitamin d3, dapagliflozin, docusate sodium, famotidine, ferrous gluconate, finasteride, folic acid, levetiracetam, magnesium oxide, montelukast, multivitamin with iron, nifedipine xl, tamsulosin, trazodone, vitamin c, albuterol, amantadine, arformoterol, budesonide, budesonide-formoterol, duloxetine, levemir, insulin lispro (1 unit dial), ipratropium-albuterol, isopropyl myristate, ketoconazole, o2, ondansetron, ozempic (0.25 or 0.5 mg/dose),  and tobramycin. and the patient is reported to be caregiver will take responsibility for med compliance; patient is compliant with medication protocol,  Medications are reported to be non-effective in controlling symptoms and changes have been made to the medication protocol.  Regarding these diagnoses, referrals were made to the following provider(s):  specialists.  All notes on chart for PCP to review.    The patient was monitored remotely for pain; medications; blood glucose; mood & behavior.    The patient's physical needs include:  help taking medications as prescribed; medication education; needs assistance with ADLs; resources for disability needs; DME supplies.     The patient's mental support needs include:  continued support    The patient's cognitive support needs include:  medication; continued support; needs assistance with ADLs; health care    The patient's psychosocial support needs include:  continued support; coordination of community providers; medication management or adherence, Has great supportive family.    The patient's functional needs include: health care coverage; medication education; needs assistance for ADLs; physical healthcare; physician referral; resources for disability needs, Limited mobility.    The patient's environmental needs include:  resources for disability needs; no involvement in outside activities or no access to other activities    Care Plan overall comments:  Still not at goal, however improving. will continue to follow. Has many upcoming appointments and referrals to specialists. A1c not at goal, continuing ways to improve with addition of new medications. Will follow.    Refer to previous outreach notes for more information on the areas listed above.    Monthly Billing Diagnoses  (N18.31) Stage 3a chronic kidney disease    (Z74.09) Impaired mobility and endurance    (J96.22) Acute on chronic respiratory failure with hypercapnia    (E11.65,  Z79.4) Type 2 diabetes  mellitus with hyperglycemia, with long-term current use of insulin    Medications   Medications have been reconciled    Care Plan progress this month:      Recently Modified Care Plans Updates made since 6/28/2024 12:00 AM      No recently modified care plans.            Current Specialty Plan of Care Status signed by both patient and provider    Instructions   Patient was provided an electronic copy of care plan  CCM services were explained and offered and patient has accepted these services.  Patient has given their written consent to receive CCM services and understands that this includes the authorization of electronic communication of medical information with the other treating providers.  Patient understands that they may stop CCM services at any time and these changes will be effective at the end of the calendar month and will effectively revocate the agreement of CCM services.  Patient understands that only one practitioner can furnish and be paid for CCM services during one calendar month.  Patient also understands that there may be co-payment or deductible fees in association with CCM services.  Patient will continue with at least monthly follow-up calls with the Ambulatory .    Tara GOMEZ  Ambulatory Case Management    7/29/2024, 14:09 EDT

## 2024-07-29 NOTE — TELEPHONE ENCOUNTER
Pt is asking for a POC.  Will you order a walk in the hospital as his next appt here is a month out?    Thank you

## 2024-07-30 ENCOUNTER — PATIENT OUTREACH (OUTPATIENT)
Dept: CASE MANAGEMENT | Facility: OTHER | Age: 59
End: 2024-07-30
Payer: COMMERCIAL

## 2024-07-30 DIAGNOSIS — Z74.09 IMPAIRED MOBILITY AND ENDURANCE: Primary | ICD-10-CM

## 2024-07-30 DIAGNOSIS — J15.1 PNEUMONIA DUE TO PSEUDOMONAS SPECIES, UNSPECIFIED LATERALITY, UNSPECIFIED PART OF LUNG: ICD-10-CM

## 2024-07-30 RX ORDER — TOBRAMYCIN INHALATION SOLUTION 300 MG/5ML
300 INHALANT RESPIRATORY (INHALATION) DAILY
Qty: 150 ML | Refills: 11 | Status: SHIPPED | OUTPATIENT
Start: 2024-07-30

## 2024-07-30 NOTE — TELEPHONE ENCOUNTER
Pt has had oxygen since sept 2019 and will need a 5 year recert 9/2024. At this time he could test for a POC.

## 2024-08-02 ENCOUNTER — PATIENT OUTREACH (OUTPATIENT)
Dept: CASE MANAGEMENT | Facility: OTHER | Age: 59
End: 2024-08-02
Payer: COMMERCIAL

## 2024-08-02 DIAGNOSIS — Z71.89 MEDICATION CARE PLAN DISCUSSED WITH PATIENT: ICD-10-CM

## 2024-08-02 DIAGNOSIS — K59.09 OTHER CONSTIPATION: Primary | ICD-10-CM

## 2024-08-02 NOTE — OUTREACH NOTE
AMBULATORY CASE MANAGEMENT NOTE    Names and Relationships of Patient/Support Persons: Contact: Kami Wallis; Relationship: Emergency Contact -     Conversation with Elizabeth regarding constipation and options.  She admits that nydia sometimes gets impacted and she has to manually remove stool.  She would like to try using 2 stool softeners every other day.  Also suggested she try Doculax glycerin suppositories to help soften the stool in the vault and hopefully he can have a bowel movement.    We reviewed medication for monthly review and planned 30 days in advance for him.  Several changes to medications this month.        Tara GOMEZ  Ambulatory Case Management    8/2/2024, 08:26 EDT

## 2024-08-05 NOTE — PROGRESS NOTES
Primary Care Provider  Kimmy Riley MD     Referring Provider  No ref. provider found     Chief Complaint  Cough, Shortness of Breath, Wheezing, COPD, and Follow-up    Subjective          History of Presenting Illness  atient is a 59-year-old male, patient of Dr. Jackson who presents for management of bronchiectasis and very severe COPD and has a history of recurrent Pseudomonas infection and chronic respiratory failure who presents for a follow-up visit today.  Patient's wife is present with the patient the office today.  Patient did have a hospitalization back at the end of April beginning of May 2024 for acute on chronic hypercapnic and hypoxic respiratory failure, obesity hypoventilation syndrome, and pneumonia due to Pseudomonas species.  Patient did follow-up in the clinic after hospitalization.  Patient was started on tobramycin nebulizer treatments upon discharge in the hospital, however patient has only been taking tobramycin nebulizer treatments once daily instead of twice daily.  Patient states that he does get short of breath that is worse with exertion, moderate in severity, and improved with rest.  Patient states that he is taking Breztri, Brovana, and Pulmicort along with using DuoNebs and albuterol inhaler as needed.  Patient is also taking Singulair.  Patient states that he is using his BiPAP machine with oxygen bled in.  Patient is on continuous oxygen at 2 L/min via nasal cannula.  Patient is needing a 6-minute walk test in the office today to recertify for his oxygen.  Patient states that he is using his oxygen and his oxygen helps him to be able perform activities of daily living a lot easier and attend appointments easier.  Since last office visit patient had chest CT scan completed on 7/22/2024.  Report states as compared to 4/15/2024 chest CT, there is improved aeration of the lungs with decreased but persistent groundglass opacities. There is tree-in-bud nodularity in the left  "lower lobe which is also decreased. Resolved left pleural effusion with decreased now small right pleural effusion. No new airspace consolidation. No new suspicious pulmonary nodule or mass. 9 mm lingular nodule has resolved and is consistent with prior infectious etiology. Background persistent upper lobe predominant bronchiectasis. Mediastinal lymphadenopathy, presumably reactive. Patient denies fever, chills, night sweats, swollen glands in the head and neck, unintentional weight loss, hemoptysis, purulent sputum production, dysphagia, chest pain, palpitations, chest tightness, abdominal pain, nausea, vomiting, and diarrhea.  Patient also denies any myalgias, changes in sense of taste and/or smell, sore throat, any other coronavirus or flu-like symptoms.  Patient denies any leg swelling, orthopnea, paroxysmal nocturnal dyspnea.  Patient does receive assistance with activities of daily living by his wife.        Review of Systems     Family History   Problem Relation Age of Onset    Coronary artery disease Mother     Hypertension Mother     Diabetes type II Mother     Asthma Father     Diabetes type II Sister     Cancer Sister         Social History     Socioeconomic History    Marital status:    Tobacco Use    Smoking status: Former     Current packs/day: 0.00     Average packs/day: 1 pack/day for 12.0 years (12.0 ttl pk-yrs)     Types: Cigarettes     Start date:      Quit date:      Years since quittin.6     Passive exposure: Past    Smokeless tobacco: Never   Vaping Use    Vaping status: Never Used   Substance and Sexual Activity    Alcohol use: Not Currently    Drug use: Never    Sexual activity: Defer        Past Medical History:   Diagnosis Date    Age-related cognitive decline     Allergic contact dermatitis     Allergies     Anemia     Bedbound     2023 \"MY LEG MUSCLES STOPPED WORKING\"    Bronchiectasis with acute lower respiratory infection     Charcot foot due to diabetes mellitus " 09/10/2013    Chronic diastolic (congestive) heart failure     Chronic kidney disease     Chronic respiratory failure with hypoxia     Closed supracondylar fracture of femur 01/12/2022    COPD (chronic obstructive pulmonary disease)     Deep vein thrombosis (DVT) of lower extremity associated with air travel 01/13/2023    Dependence on supplemental oxygen     Eczema     Erectile dysfunction     due to organic reasons    Essential (primary) hypertension     Fracture     closed fracture of other tarsal and metatarsal bones    Fracture of proximal humerus 01/13/2023    GERD without esophagitis     High risk medication use     Hypercholesteremia     Hypomagnesemia     Infected stasis ulcer of left lower extremity 01/13/2023    Insomnia     Low back pain     Major depressive disorder     Morbid (severe) obesity due to excess calories     MRSA pneumonia     Muscle weakness     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 without evidence of necrosis     Obstructive sleep apnea (adult) (pediatric)     On home O2     REPORTS WEARING 2L/NC AAT    Other forms of dyspnea     Other long term (current) drug therapy     Other specified noninfective gastroenteritis and colitis     Other spondylosis, lumbar region     Pain in both knees     Paroxysmal atrial fibrillation     Peripheral neuropathy     attributed to type 2 diabetes    Pneumonia, unspecified organism     Polyneuropathy     Rash and other nonspecific skin eruption     Syncope and collapse     Tachycardia     Tinnitus 01/13/2023    Type 1 diabetes mellitus with diabetic chronic kidney disease     Type 2 diabetes mellitus     Unspecified fall, initial encounter     Urinary retention         Immunization History   Administered Date(s) Administered    Flu Vaccine Quad PF >36MO 10/18/2016, 10/16/2017, 11/04/2019    Flu Vaccine Split Quad 11/04/2019    Fluzone  >6mos 09/19/2013    Fluzone (or Fluarix & Flulaval for VFC) >6mos 10/18/2016,  10/16/2017, 11/04/2019, 10/19/2022, 11/14/2023    Influenza Injectable Mdck Pf Quad 10/19/2022    Influenza, Unspecified 09/21/2020    PEDS-Pneumococcal Conjugate (PCV7) 11/14/2023    Pneumococcal Conjugate 20-Valent (PCV20) 11/14/2023    Pneumococcal Polysaccharide (PPSV23) 11/20/1997    Shingrix 07/11/2024    Tdap 09/18/2018    influenza Split 11/04/2019       Allergies   Allergen Reactions    Benadryl [Diphenhydramine] Itching    Proventil [Albuterol] Other (See Comments)     Mouth sores            Current Outpatient Medications:     albuterol sulfate  (90 Base) MCG/ACT inhaler, Inhale 2 puffs Every 4 (Four) Hours As Needed for Wheezing or Shortness of Air for up to 30 days. Pt cannot take Proventil., Disp: 18 g, Rfl: 5    amantadine (SYMMETREL) 100 MG tablet, , Disp: , Rfl:     apixaban (Eliquis) 5 MG tablet tablet, Take 1 tablet by mouth Every 12 (Twelve) Hours., Disp: 60 tablet, Rfl: 3    arformoterol (BROVANA) 15 MCG/2ML nebulizer solution, Take 2 mL by nebulization 2 (Two) Times a Day., Disp: 360 mL, Rfl: 3    aspirin 81 MG EC tablet, Take 1 tablet by mouth Every Morning., Disp: 30 tablet, Rfl: 3    atorvastatin (LIPITOR) 20 MG tablet, Take 1 tablet by mouth Every Night., Disp: 30 tablet, Rfl: 3    budesonide (Pulmicort) 0.5 MG/2ML nebulizer solution, Take 2 mL by nebulization 2 (Two) Times a Day., Disp: 360 mL, Rfl: 3    bumetanide (BUMEX) 2 MG tablet, Take 1 tablet by mouth 2 (Two) Times a Day., Disp: 60 tablet, Rfl: 3    busPIRone (BUSPAR) 15 MG tablet, Take 1 tablet by mouth 2 (Two) Times a Day., Disp: 60 tablet, Rfl: 3    calcium citrate (CALCITRATE) 950 (200 Ca) MG tablet, Take 1 tablet by mouth Every Night., Disp: 30 tablet, Rfl: 3    carvedilol (COREG) 25 MG tablet, Take 1 tablet by mouth Every 12 (Twelve) Hours., Disp: 60 tablet, Rfl: 3    Cholecalciferol (Vitamin D3) 50 MCG (2000 UT) tablet, Take 1 tablet by mouth Every Morning., Disp: 30 tablet, Rfl: 3    dapagliflozin (Farxiga) 5 MG tablet  tablet, Take 1 tablet by mouth Every Morning., Disp: 30 tablet, Rfl: 3    docusate sodium (COLACE) 100 MG capsule, Take 1 capsule by mouth 2 (Two) Times a Day., Disp: 60 capsule, Rfl: 3    DULoxetine (Cymbalta) 60 MG capsule, Take 1 capsule by mouth Daily., Disp: 90 capsule, Rfl: 1    famotidine (PEPCID) 40 MG tablet, Take 1 tablet by mouth Every Morning., Disp: 30 tablet, Rfl: 3    ferrous gluconate (FERGON) 324 MG tablet, Take 1 tablet by mouth Every Morning., Disp: 30 tablet, Rfl: 3    finasteride (PROSCAR) 5 MG tablet, Take 1 tablet by mouth Every Night., Disp: 30 tablet, Rfl: 3    folic acid (FOLVITE) 1 MG tablet, Take 1 tablet by mouth Every Night., Disp: 30 tablet, Rfl: 3    insulin detemir (Levemir) 100 UNIT/ML injection, Inject 10 Units under the skin into the appropriate area as directed Every Night., Disp: 15 mL, Rfl: 2    Insulin Lispro, 1 Unit Dial, (HUMALOG) 100 UNIT/ML solution pen-injector, inject EIGHT units under the skin INTO THE APPROPRIATE AREA THREE TIMES DAILY BEFORE meals PER sliding scale, Disp: 15 mL, Rfl: 0    ipratropium-albuterol (DUO-NEB) 0.5-2.5 mg/3 ml nebulizer, Take 3 mL by nebulization Every 4 (Four) Hours As Needed for Wheezing or Shortness of Air., Disp: 360 mL, Rfl: 5    Isopropyl Myristate solution, Apply 1 Application topically to the appropriate area as directed., Disp: , Rfl:     Ketoconazole 1 % shampoo, Apply 10 mL topically Every 3 (Three) Days., Disp: 200 mL, Rfl: 0    magnesium oxide (MAG-OX) 400 tablet tablet, Take 1 tablet by mouth Every Night., Disp: 30 tablet, Rfl: 3    montelukast (SINGULAIR) 10 MG tablet, Take 1 tablet by mouth Every Night., Disp: 30 tablet, Rfl: 5    Multiple Vitamins-Iron (multivitamin with iron) tablet tablet, Take 1 tablet by mouth Every Morning., Disp: 30 tablet, Rfl: 3    NIFEdipine XL (PROCARDIA XL) 30 MG 24 hr tablet, Take 1 tablet by mouth Every Morning., Disp: 30 tablet, Rfl: 3    O2 (OXYGEN), 2 Liter O2 - CONTINUOUS (route: Oxygen),  Disp: , Rfl:     Semaglutide,0.25 or 0.5MG/DOS, (Ozempic, 0.25 or 0.5 MG/DOSE,) 2 MG/3ML solution pen-injector, Inject 0.25 mg under the skin into the appropriate area as directed 1 (One) Time Per Week. Dx: E11.65  Indications: Type 2 Diabetes, Disp: 3 mL, Rfl: 0    tamsulosin (FLOMAX) 0.4 MG capsule 24 hr capsule, Take 1 capsule by mouth Every Night., Disp: 30 capsule, Rfl: 3    tobramycin PF (MOIZ) 300 MG/5ML nebulizer solution, Take 5 mL by nebulization 2 (Two) Times a Day for 30 days. Take 5 mL by nebulization twice daily for 28 days on and 28 days off, Disp: 300 mL, Rfl: 11    traZODone (DESYREL) 50 MG tablet, Take 1 tablet by mouth Every Night., Disp: 30 tablet, Rfl: 3    vitamin C (ASCORBIC ACID) 500 MG tablet, Take 1 tablet by mouth 2 (Two) Times a Day., Disp: 60 tablet, Rfl: 3     Objective     Physical Exam  Constitutional:       Appearance: Normal appearance.   HENT:      Head: Normocephalic and atraumatic.   Eyes:      Extraocular Movements: Extraocular movements intact.      Conjunctiva/sclera: Conjunctivae normal.      Pupils: Pupils are equal, round, and reactive to light.   Cardiovascular:      Rate and Rhythm: Normal rate and regular rhythm.   Pulmonary:      Effort: Pulmonary effort is normal.      Comments: Diminished breath sounds bilaterally. No wheezes, rales, or rhonchi appreciated.  Normal work of breathing noted.  Patient is able speak full sentences without difficulty.  Patient is on 2 L of oxygen per minute via nasal cannula.  Abdominal:      General: Bowel sounds are normal.      Palpations: Abdomen is soft.   Musculoskeletal:      Cervical back: Normal range of motion.   Skin:     General: Skin is warm and dry.   Neurological:      General: No focal deficit present.      Mental Status: He is alert and oriented to person, place, and time.   Psychiatric:         Mood and Affect: Mood normal.         Behavior: Behavior normal.           /53 (BP Location: Left arm, Patient Position:  "Sitting, Cuff Size: Large Adult)   Pulse 76   Temp 98 °F (36.7 °C)   Resp 16   Ht 175.3 cm (69.02\")   Wt 109 kg (241 lb) Comment: patient reported.  SpO2 99% Comment: 2 liters of oxygen cf  BMI 35.57 kg/m²         Result Review :   I have reviewed [facility] last office visit note.  I also reviewed chest CT report dated from 7/22/2024.  See scanned report.    Procedures:      CT Chest Without Contrast Diagnostic    Result Date: 7/23/2024  Impression: As compared to 4/15/2024 chest CT, there is improved aeration of the lungs with decreased but persistent groundglass opacities. There is tree-in-bud nodularity in the left lower lobe which is also decreased. Resolved left pleural effusion with decreased now small right pleural effusion. No new airspace consolidation. No new suspicious pulmonary nodule or mass. 9 mm lingular nodule has resolved and is consistent with prior infectious etiology. Background persistent upper lobe predominant bronchiectasis. Mediastinal lymphadenopathy, presumably reactive. Additional ancillary findings as above. Electronically Signed: Shane Thomas MD  7/23/2024 12:09 PM EDT  Workstation ID: [id number]        Assessment and Plan      Assessment:  1.  Very severe COPD with an FEV1 of 21% in 2008, noncompliant with inhalers.  2.  Bronchiectasis.  3.  Recurrent Pseudomonas pneumonia and bronchopneumonia with multidrug-resistant Pseudomonas, patient is now on tobramycin nebulizer treatments.  4.  Chronic dyspnea.  5.  Chronic cough.  6.  Chronic wheezing.  7.  GERD.  8.  History of PE in July 2019 on Xarelto without cor pulmonale.  9.  Obstructive sleep apnea: On BiPAP machine.    10.  Tobacco abuse of cigarettes in remission.      Will repeat chest CT scan again in 3 months to ensure resolution of pneumonia.  Order placed today.      Plan:  1.  Patient had a 6-minute walk test completed in the office today.  See below:    Resting at room air-   96%  room air      HR- 74   1:00 " minute on exertion-  90% room air      HR- 76   2 minutes on exertion- 89%    room air        HR- 76   3 minutes on exertion-   84%  2 liters cont.        HR- 78   4 minutes on exertion-  90%  2 liters cont.           HR- 74   5 minutes on exertion-  94%  2 liters cont.         HR- 76   6 minutes on exertions- 95%  2 liters cont.         HR- 76   Recovery on oxygen-  96%  2 liters cont.            HR- 80     Patient continues to qualify for oxygen.  New order for oxygen therapy placed today.  Will have our DME coordinator send over oxygen recertification for patient to continue oxygen.  Patient to continue oxygen to keep SPO2 at 89% and above.  2.  Recent chest CT scan did show improved aeration of the lungs with decrease the persisting groundglass opacities.  There is a tree-in-bud nodularity left lower lobe which is also decreased.  Patient has only been taking Romycin nebulizer treatments once daily instead of twice daily.  Communicated with Dr. Rivera while patient was in the office today.  Will increase tobramycin nebulizer treatments to twice daily for 28 days on and 28 days off.  New prescription has been sent to [facility].  3.  Will order sputum culture.  4.  Patient reports that he has been taking Breztri, Brovana, and Pulmicort.  Advised patient that he cannot take duplicate therapy.  Patient prefers to stay on nebulizer treatments.  Patient to continue Brovana and Pulmicort nebulizer treatments twice daily as prescribed and rinse mouth out after each use.  5.  Continue albuterol inhaler and DuoNeb nebulizer treatments as needed.  Patient states that he is not able to take brand-name Proventil as it makes his mouth breakout, however he can take other brand-name albuterol without an issue.  Note has been sent to the pharmacy for them to not fill Proventil brand name albuterol inhaler.  6.  Will repeat chest CT scan again in October 2024 to ensure resolution of pneumonia.  Order placed today.  7.   Continue BiPAP with oxygen bled in at current settings at night and with naps oxygen bled in and clean mask and tubing daily.    8.  For  GERD,  patient to continue Pepcid.   Patient is also advised to sleep with the head of the bed elevated and do not eat 3-4 hours prior to bedtime.  9.  Continue flutter valve with nebulizer treatments to assist with airway clearance.  10.  Continue Singulair.  11.  Vaccination status:  patient reports they are up-to-date with flu and pneumonia vaccine.  Patient is advised receive the new flu vaccine when it becomes available.  Patient declines COVID-19 vaccinations.  Discussed with patient the benefits of vaccination including decreased risk of severe illness, hospitalization and death related to flu, pneumonia, and COVID-19.  Patient verbalized understanding.  Patient is advised to continue to follow CDC recommendations such as social distancing, wearing a mask, and washing hands for least 20 seconds.  12.  Smoking status: Patient is a former cigarette smoker.  Not eligible for lung cancer screening as patient reports he quit smoking in 1993.  13.  Patient to call the office, 911, or go to the ER with new or worsening symptoms.  14.  Follow-up in October 2024, sooner if needed.      I spent 40 minutes caring for Preston on this date of service. This time includes time spent by me in the following activities:preparing for the visit, reviewing tests, obtaining and/or reviewing a separately obtained history, performing a medically appropriate examination and/or evaluation , counseling and educating the patient/family/caregiver, ordering medications, tests, or procedures, documenting information in the medical record, independently interpreting results and communicating that information with the patient/family/caregiver, and care coordination    Follow Up   Return for October 2024 in [place] with Dr. Carolina or Betzaida.  Patient was given instructions and counseling regarding his  condition or for health maintenance advice. Please see specific information pulled into the AVS if appropriate.

## 2024-08-06 ENCOUNTER — HOSPITAL ENCOUNTER (OUTPATIENT)
Dept: GENERAL RADIOLOGY | Facility: HOSPITAL | Age: 59
Discharge: HOME | End: 2024-08-06
Admitting: SURGERY
Payer: COMMERCIAL

## 2024-08-06 DIAGNOSIS — I70.25 ATHEROSCLEROSIS OF NATIVE ARTERIES OF THE EXTREMITIES WITH ULCERATION: ICD-10-CM

## 2024-08-06 PROCEDURE — 73630 X-RAY EXAM OF FOOT: CPT

## 2024-08-07 ENCOUNTER — PREP FOR SURGERY (OUTPATIENT)
Dept: OTHER | Facility: HOSPITAL | Age: 59
End: 2024-08-07
Payer: COMMERCIAL

## 2024-08-07 DIAGNOSIS — I70.25 ATHEROSCLEROSIS OF NATIVE ARTERIES OF THE EXTREMITIES WITH ULCERATION: Primary | ICD-10-CM

## 2024-08-07 RX ORDER — SODIUM CHLORIDE 9 MG/ML
100 INJECTION, SOLUTION INTRAVENOUS CONTINUOUS
OUTPATIENT
Start: 2024-08-07

## 2024-08-12 ENCOUNTER — OFFICE VISIT (OUTPATIENT)
Dept: NEUROLOGY | Facility: CLINIC | Age: 59
End: 2024-08-12
Payer: COMMERCIAL

## 2024-08-12 VITALS
WEIGHT: 278 LBS | DIASTOLIC BLOOD PRESSURE: 56 MMHG | SYSTOLIC BLOOD PRESSURE: 154 MMHG | BODY MASS INDEX: 41.18 KG/M2 | HEIGHT: 69 IN | HEART RATE: 68 BPM

## 2024-08-12 DIAGNOSIS — I73.9 PERIPHERAL VASCULAR DISEASE: ICD-10-CM

## 2024-08-12 DIAGNOSIS — R56.9 SEIZURES: Primary | ICD-10-CM

## 2024-08-12 PROCEDURE — 3078F DIAST BP <80 MM HG: CPT | Performed by: PSYCHIATRY & NEUROLOGY

## 2024-08-12 PROCEDURE — 99214 OFFICE O/P EST MOD 30 MIN: CPT | Performed by: PSYCHIATRY & NEUROLOGY

## 2024-08-12 PROCEDURE — 1160F RVW MEDS BY RX/DR IN RCRD: CPT | Performed by: PSYCHIATRY & NEUROLOGY

## 2024-08-12 PROCEDURE — 1159F MED LIST DOCD IN RCRD: CPT | Performed by: PSYCHIATRY & NEUROLOGY

## 2024-08-12 PROCEDURE — 3077F SYST BP >= 140 MM HG: CPT | Performed by: PSYCHIATRY & NEUROLOGY

## 2024-08-12 NOTE — PROGRESS NOTES
"Chief Complaint  Neurologic Problem ([facility] [place] encephalopathy vs seizure. )    Subjective          Preston Wallis is a 59 y.o. male who presents to [facility]  History of Present Illness  59-year-old man evaluated for possible seizure while in the hospital in February.  Patient was admitted for acute on chronic respiratory failure.  On February he was experiencing worsening shortness of breath with worsening lower extremity edema, orthopnea and generalized weakness and fatigue.  Patient was admitted to the hospital and received aggressive IV diuresis.  While in the hospital according to his wife he had an episode when he was poorly responsive and he was tremulous and an EEG was performed showing generalized epileptiform discharges and he was discharged on Keppra.  He has not had any recurrent event.  He is on Keppra at 500 mg twice a day.  He requires assistance for all activities of daily living.  He has significant amount of peripheral vascular disease and a diabetic foot wound.  He is on Eliquis for A-fib.    Objective   Vital Signs:   /56   Pulse 68   Ht 175.3 cm (69.02\")   Wt 126 kg (278 lb)   BMI 41.03 kg/m²     Physical Exam   Alert, fluent, phasic, follows commands well.  Visual fields full, EMs full directions gaze, facial strength is full.  Range of motion the right shoulder is limited.  There is 4/5 strength on intrinsic hand muscles.  Is unable to lift both legs against gravity.  Heart is regular and rhythm.        Assessment and Plan  Diagnoses and all orders for this visit:    1. Seizures (Primary)  Assessment & Plan:  He may have a one-time seizure in the hospital where he was acutely ill with respiratory failure.  EEG was abnormal and I discussed with him that he is not epileptic and therefore I will wean him off Keppra 500 mg daily for 1 week, every other day for the next week and then stop taking Keppra.  She did have a recurrent event is to seek " medical attention.    I will also order a carotid ultrasound since his risk factors for cerebrovascular disease is high with significant amount of peripheral vascular disease.  They are to call me to find out results.  Thank you for let me participate in his care.           Total time spent with the patient and coordinating patient care was 35 minutes.    Follow Up  No follow-ups on file.  Patient was given instructions and counseling regarding his condition or for health maintenance advice. Please see specific information pulled into the AVS if appropriate.

## 2024-08-12 NOTE — ASSESSMENT & PLAN NOTE
He may have a one-time seizure in the hospital where he was acutely ill with respiratory failure.  EEG was abnormal and I discussed with him that he is not epileptic and therefore I will wean him off Keppra 500 mg daily for 1 week, every other day for the next week and then stop taking Keppra.  She did have a recurrent event is to seek medical attention.    I will also order a carotid ultrasound since his risk factors for cerebrovascular disease is high with significant amount of peripheral vascular disease.  They are to call me to find out results.  Thank you for let me participate in his care.

## 2024-08-13 ENCOUNTER — OFFICE VISIT (OUTPATIENT)
Dept: WOUND CARE | Facility: HOSPITAL | Age: 59
End: 2024-08-13
Payer: COMMERCIAL

## 2024-08-13 ENCOUNTER — PATIENT OUTREACH (OUTPATIENT)
Dept: CASE MANAGEMENT | Facility: OTHER | Age: 59
End: 2024-08-13
Payer: COMMERCIAL

## 2024-08-13 VITALS
RESPIRATION RATE: 18 BRPM | TEMPERATURE: 97.3 F | HEART RATE: 68 BPM | DIASTOLIC BLOOD PRESSURE: 64 MMHG | SYSTOLIC BLOOD PRESSURE: 138 MMHG

## 2024-08-13 DIAGNOSIS — Z71.89 MEDICATION CARE PLAN DISCUSSED WITH PATIENT: Primary | ICD-10-CM

## 2024-08-13 DIAGNOSIS — M86.672 OTHER CHRONIC OSTEOMYELITIS, LEFT ANKLE AND FOOT: ICD-10-CM

## 2024-08-13 DIAGNOSIS — I50.32 CHRONIC DIASTOLIC HEART FAILURE: ICD-10-CM

## 2024-08-13 DIAGNOSIS — E11.621 DIABETIC ULCER OF LEFT HEEL ASSOCIATED WITH TYPE 2 DIABETES MELLITUS, WITH NECROSIS OF BONE: ICD-10-CM

## 2024-08-13 DIAGNOSIS — I73.9 PERIPHERAL ARTERIAL DISEASE: ICD-10-CM

## 2024-08-13 DIAGNOSIS — E66.01 CLASS 3 SEVERE OBESITY WITH SERIOUS COMORBIDITY AND BODY MASS INDEX (BMI) OF 40.0 TO 44.9 IN ADULT, UNSPECIFIED OBESITY TYPE: ICD-10-CM

## 2024-08-13 DIAGNOSIS — J96.11 CHRONIC RESPIRATORY FAILURE WITH HYPOXIA, ON HOME O2 THERAPY: ICD-10-CM

## 2024-08-13 DIAGNOSIS — L97.509 TYPE 2 DIABETES MELLITUS WITH FOOT ULCER, WITH LONG-TERM CURRENT USE OF INSULIN: ICD-10-CM

## 2024-08-13 DIAGNOSIS — Z79.4 TYPE 2 DIABETES MELLITUS WITH FOOT ULCER, WITH LONG-TERM CURRENT USE OF INSULIN: ICD-10-CM

## 2024-08-13 DIAGNOSIS — J44.9 CHRONIC OBSTRUCTIVE PULMONARY DISEASE, UNSPECIFIED COPD TYPE: ICD-10-CM

## 2024-08-13 DIAGNOSIS — E11.621 TYPE 2 DIABETES MELLITUS WITH FOOT ULCER, WITH LONG-TERM CURRENT USE OF INSULIN: ICD-10-CM

## 2024-08-13 DIAGNOSIS — L89.893 PRESSURE INJURY OF LEFT FOOT, STAGE 3: ICD-10-CM

## 2024-08-13 DIAGNOSIS — L89.620 PRESSURE INJURY OF LEFT HEEL, UNSTAGEABLE: Primary | ICD-10-CM

## 2024-08-13 DIAGNOSIS — Z99.81 CHRONIC RESPIRATORY FAILURE WITH HYPOXIA, ON HOME O2 THERAPY: ICD-10-CM

## 2024-08-13 DIAGNOSIS — L97.424 DIABETIC ULCER OF LEFT HEEL ASSOCIATED WITH TYPE 2 DIABETES MELLITUS, WITH NECROSIS OF BONE: ICD-10-CM

## 2024-08-13 PROCEDURE — 1159F MED LIST DOCD IN RCRD: CPT | Performed by: EMERGENCY MEDICINE

## 2024-08-13 PROCEDURE — 3075F SYST BP GE 130 - 139MM HG: CPT | Performed by: EMERGENCY MEDICINE

## 2024-08-13 PROCEDURE — 3078F DIAST BP <80 MM HG: CPT | Performed by: EMERGENCY MEDICINE

## 2024-08-13 PROCEDURE — 1160F RVW MEDS BY RX/DR IN RCRD: CPT | Performed by: EMERGENCY MEDICINE

## 2024-08-13 PROCEDURE — G0463 HOSPITAL OUTPT CLINIC VISIT: HCPCS | Performed by: EMERGENCY MEDICINE

## 2024-08-13 RX ORDER — AMOXICILLIN AND CLAVULANATE POTASSIUM 875; 125 MG/1; MG/1
1 TABLET, FILM COATED ORAL 2 TIMES DAILY
Qty: 84 TABLET | Refills: 0 | Status: ON HOLD | OUTPATIENT
Start: 2024-08-13 | End: 2024-08-15

## 2024-08-13 RX ORDER — DOXYCYCLINE HYCLATE 100 MG/1
100 CAPSULE ORAL 2 TIMES DAILY
Qty: 84 CAPSULE | Refills: 0 | Status: SHIPPED | OUTPATIENT
Start: 2024-08-13 | End: 2024-08-15 | Stop reason: HOSPADM

## 2024-08-13 NOTE — PROGRESS NOTES
Chief Complaint  Wound Check (Wound check to left heel, pt continues with daily dressing changes, (Tobramycin nebulizer ) )    Subjective      History of Present Illness    Preston Wallis  is a 59 y.o. male with multiple medical problems including COPD and chronic respiratory failure on oxygen, chronic anemia, chronic diastolic CHF, A-fib, and type 2 diabetes.  Patient was referred here by his podiatrist for evaluation of wounds on his left foot.  Patient reports that these have been there since about December 2023.  He has had repeated hospitalizations, primarily for respiratory issues, and he and his wife believe they started due to extended illness but are not completely sure.      Currently they are applying Betadine soaked gauze to the wounds on his left heel and left fifth MPJ.  Patient reports that he mostly stays in bed and his wife helps him a great deal.  They did give a offloading boot and he tries to stay off of the wound is much as possible.  He does typically sleep on his back and has been advised on different strategies to offload.  The patient says he is basically in his wheelchair all the time and does not go anywhere other than to doctors appointments in the [facility] van takes him to those since he is not able to walk due to wounds and chronic weakness.    Patient is scheduled to have a LLE angiogram with Dr. Willson in 2 days to assess blood flow.  His blood sugar remains poorly controlled and typically running 160-250.    Allergies:  Benadryl [diphenhydramine] and Proventil [albuterol]      Current Outpatient Medications:     albuterol (PROVENTIL) (2.5 MG/3ML) 0.083% nebulizer solution, Inhale 2.5 mg., Disp: , Rfl:     amantadine (SYMMETREL) 100 MG tablet, , Disp: , Rfl:     amoxicillin-clavulanate (AUGMENTIN) 875-125 MG per tablet, Take 1 tablet by mouth 2 (Two) Times a Day for 42 days., Disp: 84 tablet, Rfl: 0    apixaban (Eliquis) 5 MG tablet tablet, Take 1 tablet by mouth Every 12 (Twelve) Hours.,  Disp: 60 tablet, Rfl: 3    arformoterol (BROVANA) 15 MCG/2ML nebulizer solution, Take 2 mL by nebulization 2 (Two) Times a Day., Disp: 360 mL, Rfl: 3    aspirin 81 MG EC tablet, Take 1 tablet by mouth Every Morning., Disp: 30 tablet, Rfl: 3    atorvastatin (LIPITOR) 20 MG tablet, Take 1 tablet by mouth Every Night., Disp: 30 tablet, Rfl: 3    budesonide (Pulmicort) 0.5 MG/2ML nebulizer solution, Take 2 mL by nebulization 2 (Two) Times a Day., Disp: 360 mL, Rfl: 3    budesonide-formoterol (Symbicort) 160-4.5 MCG/ACT inhaler, Inhale 2 puffs., Disp: , Rfl:     bumetanide (BUMEX) 2 MG tablet, Take 1 tablet by mouth 2 (Two) Times a Day., Disp: 60 tablet, Rfl: 3    busPIRone (BUSPAR) 15 MG tablet, Take 1 tablet by mouth 2 (Two) Times a Day., Disp: 60 tablet, Rfl: 3    calcium citrate (CALCITRATE) 950 (200 Ca) MG tablet, Take 1 tablet by mouth Every Night., Disp: 30 tablet, Rfl: 3    carvedilol (COREG) 25 MG tablet, Take 1 tablet by mouth Every 12 (Twelve) Hours., Disp: 60 tablet, Rfl: 3    Cholecalciferol (Vitamin D3) 50 MCG (2000 UT) tablet, Take 1 tablet by mouth Every Morning., Disp: 30 tablet, Rfl: 3    dapagliflozin (Farxiga) 5 MG tablet tablet, Take 1 tablet by mouth Every Morning., Disp: 30 tablet, Rfl: 3    docusate sodium (COLACE) 100 MG capsule, Take 1 capsule by mouth 2 (Two) Times a Day., Disp: 60 capsule, Rfl: 3    doxycycline (VIBRAMYCIN) 100 MG capsule, Take 1 capsule by mouth 2 (Two) Times a Day for 42 days. Do not take at the same time as any vitamin or mineral supplements., Disp: 84 capsule, Rfl: 0    DULoxetine (Cymbalta) 60 MG capsule, Take 1 capsule by mouth Daily., Disp: 90 capsule, Rfl: 1    famotidine (PEPCID) 40 MG tablet, Take 1 tablet by mouth Every Morning., Disp: 30 tablet, Rfl: 3    ferrous gluconate (FERGON) 324 MG tablet, Take 1 tablet by mouth Every Morning., Disp: 30 tablet, Rfl: 3    finasteride (PROSCAR) 5 MG tablet, Take 1 tablet by mouth Every Night., Disp: 30 tablet, Rfl: 3    folic  acid (FOLVITE) 1 MG tablet, Take 1 tablet by mouth Every Night., Disp: 30 tablet, Rfl: 3    insulin detemir (Levemir) 100 UNIT/ML injection, Inject 10 Units under the skin into the appropriate area as directed Every Night., Disp: 15 mL, Rfl: 2    Insulin Lispro, 1 Unit Dial, (HUMALOG) 100 UNIT/ML solution pen-injector, inject EIGHT units under the skin INTO THE APPROPRIATE AREA THREE TIMES DAILY BEFORE meals PER sliding scale, Disp: 15 mL, Rfl: 0    ipratropium-albuterol (DUO-NEB) 0.5-2.5 mg/3 ml nebulizer, Take 3 mL by nebulization Every 4 (Four) Hours As Needed for Wheezing or Shortness of Air., Disp: 360 mL, Rfl: 5    Isopropyl Myristate solution, Apply 1 Application topically to the appropriate area as directed., Disp: , Rfl:     Ketoconazole 1 % shampoo, Apply 10 mL topically Every 3 (Three) Days., Disp: 200 mL, Rfl: 0    magnesium oxide (MAG-OX) 400 tablet tablet, Take 1 tablet by mouth Every Night., Disp: 30 tablet, Rfl: 3    montelukast (SINGULAIR) 10 MG tablet, Take 1 tablet by mouth Every Night., Disp: 30 tablet, Rfl: 3    Multiple Vitamins-Iron (multivitamin with iron) tablet tablet, Take 1 tablet by mouth Every Morning., Disp: 30 tablet, Rfl: 3    NIFEdipine XL (PROCARDIA XL) 30 MG 24 hr tablet, Take 1 tablet by mouth Every Morning., Disp: 30 tablet, Rfl: 3    O2 (OXYGEN), 2 Liter O2 - CONTINUOUS (route: Oxygen), Disp: , Rfl:     ondansetron (ZOFRAN) 4 MG tablet, Take 1 tablet by mouth., Disp: , Rfl:     Semaglutide,0.25 or 0.5MG/DOS, (Ozempic, 0.25 or 0.5 MG/DOSE,) 2 MG/3ML solution pen-injector, Inject 0.25 mg under the skin into the appropriate area as directed 1 (One) Time Per Week. Dx: E11.65  Indications: Type 2 Diabetes, Disp: 3 mL, Rfl: 0    tamsulosin (FLOMAX) 0.4 MG capsule 24 hr capsule, Take 1 capsule by mouth Every Night., Disp: 30 capsule, Rfl: 3    tobramycin PF (MOIZ) 300 MG/5ML nebulizer solution, Take 5 mL by nebulization Daily., Disp: 150 mL, Rfl: 11    traZODone (DESYREL) 50 MG  "tablet, Take 1 tablet by mouth Every Night., Disp: 30 tablet, Rfl: 3    vitamin C (ASCORBIC ACID) 500 MG tablet, Take 1 tablet by mouth 2 (Two) Times a Day., Disp: 60 tablet, Rfl: 3    Past Medical History:   Diagnosis Date    Age-related cognitive decline     Allergic contact dermatitis     Allergies     Anemia     Bedbound     11/2023 \"MY LEG MUSCLES STOPPED WORKING\"    Bronchiectasis with acute lower respiratory infection     Charcot foot due to diabetes mellitus 09/10/2013    Chronic diastolic (congestive) heart failure     Chronic kidney disease     Chronic respiratory failure with hypoxia     Closed supracondylar fracture of femur 01/12/2022    COPD (chronic obstructive pulmonary disease)     Deep vein thrombosis (DVT) of lower extremity associated with air travel 01/13/2023    Dependence on supplemental oxygen     Eczema     Erectile dysfunction     due to organic reasons    Essential (primary) hypertension     Fracture     closed fracture of other tarsal and metatarsal bones    Fracture of proximal humerus 01/13/2023    GERD without esophagitis     High risk medication use     Hypercholesteremia     Hypomagnesemia     Infected stasis ulcer of left lower extremity 01/13/2023    Insomnia     Low back pain     Major depressive disorder     Morbid (severe) obesity due to excess calories     MRSA pneumonia     Muscle weakness     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 without evidence of necrosis     Obstructive sleep apnea (adult) (pediatric)     On home O2     REPORTS WEARING 2L/NC AAT    Other forms of dyspnea     Other long term (current) drug therapy     Other specified noninfective gastroenteritis and colitis     Other spondylosis, lumbar region     Pain in both knees     Paroxysmal atrial fibrillation     Peripheral neuropathy     attributed to type 2 diabetes    Pneumonia, unspecified organism     Polyneuropathy     Rash and other nonspecific skin eruption     " Syncope and collapse     Tachycardia     Tinnitus 2023    Type 1 diabetes mellitus with diabetic chronic kidney disease     Type 2 diabetes mellitus     Unspecified fall, initial encounter     Urinary retention      Past Surgical History:   Procedure Laterality Date    CHOLECYSTECTOMY      CYSTOSCOPY      FEMUR SURGERY Left     Shravan placed    KNEE SURGERY Left     OTHER SURGICAL HISTORY Left     venous port, REMOVED    PORTACATH PLACEMENT Right     TIBIAL PLATEAU OPEN REDUCTION INTERNAL FIXATION Left 2023    Procedure: TIBIAL PLATEAU OPEN REDUCTION INTERNAL FIXATION;  Surgeon: Hugo Kline MD;  Location: [facility] OR;  Service: Orthopedics;  Laterality: Left;    TONSILLECTOMY AND ADENOIDECTOMY       Social History     Socioeconomic History    Marital status:    Tobacco Use    Smoking status: Former     Current packs/day: 0.00     Average packs/day: 1 pack/day for 12.0 years (12.0 ttl pk-yrs)     Types: Cigarettes     Start date:      Quit date:      Years since quittin.6     Passive exposure: Past    Smokeless tobacco: Never   Vaping Use    Vaping status: Never Used   Substance and Sexual Activity    Alcohol use: Not Currently    Drug use: Never    Sexual activity: Defer           Objective     Vitals:    24 1304   BP: 138/64   BP Location: Left arm   Patient Position: Sitting   Cuff Size: Adult   Pulse: 68   Resp: 18  Comment: 93% 2L   Temp: 97.3 °F (36.3 °C)   TempSrc: Temporal   PainSc:   3   PainLoc: Foot     There is no height or weight on file to calculate BMI.    STEADI Fall Risk Assessment has not been completed.     Review of Systems     ROS:  Per HPI.     I have reviewed the HPI and ROS as documented by MA/RN. Katerin Torres MD    Physical Exam     NAD, chronically ill-appearing, on oxygen  AAOx3, pleasant, cooperative  Mild edema BLE   Palpable pedal pulses, faint due to edema  LLE: Firm dry black eschar left plantar and posterior heel, getting smaller.     Improving wound left plantar fifth MPJ with mild maceration and scattered areas of slough as well as granulation tissue.  Wound is getting smaller and appears healthier.  No tenderness of either wound due to severe DM neuropathy.  No evidence of acute infection.    See photos for details.    LLE:                Result Review :  The following data was reviewed by: Katerin Torres MD on 08/13/2024:    Prior wound images.  Vascular surgery note, left foot x-ray indicates possible osteomyelitis at plantar calcaneus adjacent to the enthesophyte             Assessment and Plan   Diagnoses and all orders for this visit:    1. Pressure injury of left heel, unstageable (Primary)    2. Pressure injury of left foot, stage 3    3. Diabetic ulcer of left heel associated with type 2 diabetes mellitus, with necrosis of bone  -     amoxicillin-clavulanate (AUGMENTIN) 875-125 MG per tablet; Take 1 tablet by mouth 2 (Two) Times a Day for 42 days.  Dispense: 84 tablet; Refill: 0  -     doxycycline (VIBRAMYCIN) 100 MG capsule; Take 1 capsule by mouth 2 (Two) Times a Day for 42 days. Do not take at the same time as any vitamin or mineral supplements.  Dispense: 84 capsule; Refill: 0    4. Type 2 diabetes mellitus with foot ulcer, with long-term current use of insulin    5. Chronic respiratory failure with hypoxia, on home O2 therapy    6. Peripheral arterial disease    7. Chronic diastolic heart failure    8. Chronic obstructive pulmonary disease, unspecified COPD type    9. Class 3 severe obesity with serious comorbidity and body mass index (BMI) of 40.0 to 44.9 in adult, unspecified obesity type    10. Other chronic osteomyelitis, left ankle and foot  -     amoxicillin-clavulanate (AUGMENTIN) 875-125 MG per tablet; Take 1 tablet by mouth 2 (Two) Times a Day for 42 days.  Dispense: 84 tablet; Refill: 0  -     doxycycline (VIBRAMYCIN) 100 MG capsule; Take 1 capsule by mouth 2 (Two) Times a Day for 42 days. Do not take at the same time  as any vitamin or mineral supplements.  Dispense: 84 capsule; Refill: 0        Patient with severe wounds that appear to be related to pressure and complicated by his severe neuropathy and diabetes in addition to his pulmonary disease, heart disease, kidney disease, etc.    Wounds today are showing improvement so I recommend they continue using Betadine soaked gauze to the wounds.  This should be done daily and wounds should be kept covered at all times.    We have again discussed the importance of 100% offloading of these wounds to allow for healing and help prevent future amputations.    Patient is also advised to improve diabetic control and to eat a nutritious diet low in carbohydrates and with increased protein to aid healing.    Patient is scheduled for angiogram with Dr. Willson on 08/15 and is encouraged to keep that appointment.    Plain radiographs are suggestive of osteomyelitis of the plantar calcaneus and a focal area adjacent to the enthesophyte.  I have reached out to Dr. Casper, infectious disease, regarding recommendations.  Recommendation is for doxycycline 100 mg and amoxicillin/clavulanate 875 mg twice daily for 6 weeks of broad-spectrum antibiotic coverage.  There are no prior wound or bone cultures available in our system and there is nothing to culture today to help guide antibiotic selection.    We also discussed the possibility of hyperbaric oxygen therapy for the management of osteomyelitis but the patient lives in [place] and does not think he would be able to get here every day for his treatments.    Recheck 4 weeks, sooner if symptoms change or worsen.    Patient was given instructions and counseling regarding their condition or for health maintenance advice, as well as the wound care plan and recommendations. They understand and agree with the plan.  They will follow back up here in the clinic but are instructed to contact us in the interim should they have any new, returning, or  worsening symptoms or concerns. Please see specific information pulled into the AVS if appropriate.     Dragon Dictation utilized for chart completion.    I spent 42 minutes in both face-to-face and nonface-to-face time for patient care today including, but not limited to, review of old records, reviewing old images, history and physical exam, reviewing test results, counseling patient and family, entering orders, speaking with other physicians, coordinating care, and documenting.      Follow Up   Return in about 4 weeks (around 9/10/2024).      Katerin Torres MD

## 2024-08-13 NOTE — OUTREACH NOTE
AMBULATORY CASE MANAGEMENT NOTE    Names and Relationships of Patient/Support Persons: Contact: Kami Wallis; Relationship: Emergency Contact -     Kami reached out to report that Gómez is having a procedure ( cath) on the 15th and needs to stop his blood thinner 2 days ahead.  Was able to identify pill for removal from planner.   Also she reports that they saw the neurologist and making some changes in Keppra.  Volunteered that she can come in and can make changes in medication planner.      I see where he has cancelled his podiatry appointment.      Tara GOMEZ  Ambulatory Case Management    8/13/2024, 10:17 EDT

## 2024-08-14 PROCEDURE — S0260 H&P FOR SURGERY: HCPCS | Performed by: SURGERY

## 2024-08-14 NOTE — H&P
"[facility]   HISTORY AND PHYSICAL    Patient Name: Preston Wallis  : 1965  MRN: [id number]  Primary Care Physician:  Kimmy Riley MD  Date of admission: (Not on file)    Subjective   Subjective     Chief Complaint: Left heel ulcer    HPI:    Preston Wallis is a 59 y.o. male left heel ulcer    Review of Systems    Non contributory except for the History of Present Illness    Personal History     Past Medical History:   Diagnosis Date    Age-related cognitive decline     Allergic contact dermatitis     Allergies     Anemia     Bedbound     2023 \"MY LEG MUSCLES STOPPED WORKING\"    Bronchiectasis with acute lower respiratory infection     Charcot foot due to diabetes mellitus 09/10/2013    Chronic diastolic (congestive) heart failure     Chronic kidney disease     Chronic respiratory failure with hypoxia     Closed supracondylar fracture of femur 2022    COPD (chronic obstructive pulmonary disease)     Deep vein thrombosis (DVT) of lower extremity associated with air travel 2023    Dependence on supplemental oxygen     Eczema     Erectile dysfunction     due to organic reasons    Essential (primary) hypertension     Fracture     closed fracture of other tarsal and metatarsal bones    Fracture of proximal humerus 2023    GERD without esophagitis     High risk medication use     Hypercholesteremia     Hypomagnesemia     Infected stasis ulcer of left lower extremity 2023    Insomnia     Low back pain     Major depressive disorder     Morbid (severe) obesity due to excess calories     MRSA pneumonia     Muscle weakness     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 without evidence of necrosis     Obstructive sleep apnea (adult) (pediatric)     On home O2     REPORTS WEARING 2L/NC AAT    Other forms of dyspnea     Other long term (current) drug therapy     Other specified noninfective gastroenteritis and colitis     Other spondylosis, lumbar " region     Pain in both knees     Paroxysmal atrial fibrillation     Peripheral neuropathy     attributed to type 2 diabetes    Pneumonia, unspecified organism     Polyneuropathy     Rash and other nonspecific skin eruption     Syncope and collapse     Tachycardia     Tinnitus 01/13/2023    Type 1 diabetes mellitus with diabetic chronic kidney disease     Type 2 diabetes mellitus     Unspecified fall, initial encounter     Urinary retention        Past Surgical History:   Procedure Laterality Date    CHOLECYSTECTOMY      CYSTOSCOPY      FEMUR SURGERY Left     Shravan placed    KNEE SURGERY Left     OTHER SURGICAL HISTORY Left     venous port, REMOVED    PORTACATH PLACEMENT Right     TIBIAL PLATEAU OPEN REDUCTION INTERNAL FIXATION Left 12/22/2023    Procedure: TIBIAL PLATEAU OPEN REDUCTION INTERNAL FIXATION;  Surgeon: Hugo Kline MD;  Location: [facility];  Service: Orthopedics;  Laterality: Left;    TONSILLECTOMY AND ADENOIDECTOMY         Family History: family history includes Asthma in his father; Cancer in his sister; Coronary artery disease in his mother; Diabetes type II in his mother and sister; Hypertension in his mother. Otherwise pertinent FHx was reviewed and not pertinent to current issue.    Social History:  reports that he quit smoking about 31 years ago. His smoking use included cigarettes. He started smoking about 43 years ago. He has a 12 pack-year smoking history. He has been exposed to tobacco smoke. He has never used smokeless tobacco. He reports that he does not currently use alcohol. He reports that he does not use drugs.    Home Medications:  No current facility-administered medications on file prior to encounter.     Current Outpatient Medications on File Prior to Encounter   Medication Sig    albuterol (PROVENTIL) (2.5 MG/3ML) 0.083% nebulizer solution Inhale 2.5 mg.    amantadine (SYMMETREL) 100 MG tablet     apixaban (Eliquis) 5 MG tablet tablet Take 1 tablet by mouth Every 12  (Twelve) Hours.    arformoterol (BROVANA) 15 MCG/2ML nebulizer solution Take 2 mL by nebulization 2 (Two) Times a Day.    aspirin 81 MG EC tablet Take 1 tablet by mouth Every Morning.    atorvastatin (LIPITOR) 20 MG tablet Take 1 tablet by mouth Every Night.    budesonide (Pulmicort) 0.5 MG/2ML nebulizer solution Take 2 mL by nebulization 2 (Two) Times a Day.    budesonide-formoterol (Symbicort) 160-4.5 MCG/ACT inhaler Inhale 2 puffs.    bumetanide (BUMEX) 2 MG tablet Take 1 tablet by mouth 2 (Two) Times a Day.    busPIRone (BUSPAR) 15 MG tablet Take 1 tablet by mouth 2 (Two) Times a Day.    calcium citrate (CALCITRATE) 950 (200 Ca) MG tablet Take 1 tablet by mouth Every Night.    carvedilol (COREG) 25 MG tablet Take 1 tablet by mouth Every 12 (Twelve) Hours.    Cholecalciferol (Vitamin D3) 50 MCG (2000 UT) tablet Take 1 tablet by mouth Every Morning.    dapagliflozin (Farxiga) 5 MG tablet tablet Take 1 tablet by mouth Every Morning.    docusate sodium (COLACE) 100 MG capsule Take 1 capsule by mouth 2 (Two) Times a Day.    DULoxetine (Cymbalta) 60 MG capsule Take 1 capsule by mouth Daily.    famotidine (PEPCID) 40 MG tablet Take 1 tablet by mouth Every Morning.    ferrous gluconate (FERGON) 324 MG tablet Take 1 tablet by mouth Every Morning.    finasteride (PROSCAR) 5 MG tablet Take 1 tablet by mouth Every Night.    folic acid (FOLVITE) 1 MG tablet Take 1 tablet by mouth Every Night.    insulin detemir (Levemir) 100 UNIT/ML injection Inject 10 Units under the skin into the appropriate area as directed Every Night.    Insulin Lispro, 1 Unit Dial, (HUMALOG) 100 UNIT/ML solution pen-injector inject EIGHT units under the skin INTO THE APPROPRIATE AREA THREE TIMES DAILY BEFORE meals PER sliding scale    ipratropium-albuterol (DUO-NEB) 0.5-2.5 mg/3 ml nebulizer Take 3 mL by nebulization Every 4 (Four) Hours As Needed for Wheezing or Shortness of Air.    Isopropyl Myristate solution Apply 1 Application topically to the  appropriate area as directed.    Ketoconazole 1 % shampoo Apply 10 mL topically Every 3 (Three) Days.    magnesium oxide (MAG-OX) 400 tablet tablet Take 1 tablet by mouth Every Night.    montelukast (SINGULAIR) 10 MG tablet Take 1 tablet by mouth Every Night.    Multiple Vitamins-Iron (multivitamin with iron) tablet tablet Take 1 tablet by mouth Every Morning.    NIFEdipine XL (PROCARDIA XL) 30 MG 24 hr tablet Take 1 tablet by mouth Every Morning.    O2 (OXYGEN) 2 Liter O2 - CONTINUOUS (route: Oxygen)    ondansetron (ZOFRAN) 4 MG tablet Take 1 tablet by mouth.    Semaglutide,0.25 or 0.5MG/DOS, (Ozempic, 0.25 or 0.5 MG/DOSE,) 2 MG/3ML solution pen-injector Inject 0.25 mg under the skin into the appropriate area as directed 1 (One) Time Per Week. Dx: E11.65  Indications: Type 2 Diabetes    tamsulosin (FLOMAX) 0.4 MG capsule 24 hr capsule Take 1 capsule by mouth Every Night.    tobramycin PF (MOIZ) 300 MG/5ML nebulizer solution Take 5 mL by nebulization Daily.    traZODone (DESYREL) 50 MG tablet Take 1 tablet by mouth Every Night.    vitamin C (ASCORBIC ACID) 500 MG tablet Take 1 tablet by mouth 2 (Two) Times a Day.          Allergies:  Allergies   Allergen Reactions    Benadryl [Diphenhydramine] Itching    Proventil [Albuterol] Other (See Comments)     Mouth sores         Objective   Objective     Vitals:        Physical Exam    General: Awake, alert, NAD   Eyes:  FABIO   Neck: Supple   Lungs: Clear   Heart: RRR   Abdomen: benign   Musculoskeletal:  normal motor tone, symmetric   Skin: warm, normal turgor   Neuro: strength 5/5 all extremities   Pulses: Nonpalpable left pedal pulses.    Diagnostic studies:   The patient was to have a duplex in the office on 7/29/2024 but unfortunately he was not able to stand and he could not get on the exam table. His arterial Doppler from 4/16/2024 demonstrates noncompressible vessels. Waveforms are multiphasic throughout.     Assessment & Plan   Assessment / Plan     Active Hospital  Problems:  Active Hospital Problems    Diagnosis     **Atherosclerosis of native arteries of the extremities with ulceration        Diagnoses and all orders for this visit:    1. Atherosclerosis of native arteries of the extremities with ulceration  -     Cardiac Catheterization/Vascular Study; Standing  -     Cardiac Catheterization/Vascular Study        Assessment/plan:   Mr. Wallis has a large left heel eschar. No previous evaluation has been done of these to determine whether there is evidence of infection or affectation of the bone. I am recommending that we obtain plain films. If no clear evidence of infection, then I will recommend that we proceed to angiography. He has renal insufficiency and a CTA is not an option. He will call us to let us know when the films have been done. The plan will be to do carbon dioxide study understanding that contrast will be likely necessary as well. The goal will be to minimize the amount of contrast used. I have discussed with the patient in detail the mechanics of the procedure, the indications, benefits, risks, alternatives as well as potential complications to include but not limited to infection, bleeding, inability to cross the occlusion, vascular injury requiring surgical repair, renal failure. He appears to understand and desires to proceed.       Electronically signed by Moshe Willson MD, 08/14/24, 1:15 PM EDT.

## 2024-08-15 ENCOUNTER — HOSPITAL ENCOUNTER (OUTPATIENT)
Facility: HOSPITAL | Age: 59
Setting detail: HOSPITAL OUTPATIENT SURGERY
Discharge: HOME | End: 2024-08-15
Attending: SURGERY | Admitting: SURGERY
Payer: COMMERCIAL

## 2024-08-15 VITALS
OXYGEN SATURATION: 96 % | TEMPERATURE: 98.1 F | DIASTOLIC BLOOD PRESSURE: 59 MMHG | HEART RATE: 71 BPM | RESPIRATION RATE: 20 BRPM | WEIGHT: 247.58 LBS | HEIGHT: 69 IN | SYSTOLIC BLOOD PRESSURE: 133 MMHG | BODY MASS INDEX: 36.67 KG/M2

## 2024-08-15 DIAGNOSIS — I70.25 ATHEROSCLEROSIS OF NATIVE ARTERIES OF THE EXTREMITIES WITH ULCERATION: ICD-10-CM

## 2024-08-15 LAB
ANION GAP SERPL CALCULATED.3IONS-SCNC: 10.4 MMOL/L (ref 5–15)
BASOPHILS # BLD AUTO: 0.08 10*3/MM3 (ref 0–0.2)
BASOPHILS NFR BLD AUTO: 0.6 % (ref 0–1.5)
BUN SERPL-MCNC: 49 MG/DL (ref 6–20)
BUN/CREAT SERPL: 19.9 (ref 7–25)
CALCIUM SPEC-SCNC: 9.7 MG/DL (ref 8.6–10.5)
CHLORIDE SERPL-SCNC: 96 MMOL/L (ref 98–107)
CO2 SERPL-SCNC: 31.6 MMOL/L (ref 22–29)
CREAT SERPL-MCNC: 2.46 MG/DL (ref 0.76–1.27)
DEPRECATED RDW RBC AUTO: 46.3 FL (ref 37–54)
EGFRCR SERPLBLD CKD-EPI 2021: 29.4 ML/MIN/1.73
EOSINOPHIL # BLD AUTO: 0.59 10*3/MM3 (ref 0–0.4)
EOSINOPHIL NFR BLD AUTO: 4.5 % (ref 0.3–6.2)
ERYTHROCYTE [DISTWIDTH] IN BLOOD BY AUTOMATED COUNT: 14.5 % (ref 12.3–15.4)
GLUCOSE SERPL-MCNC: 221 MG/DL (ref 65–99)
HCT VFR BLD AUTO: 27.7 % (ref 37.5–51)
HGB BLD-MCNC: 8.6 G/DL (ref 13–17.7)
IMM GRANULOCYTES # BLD AUTO: 0.04 10*3/MM3 (ref 0–0.05)
IMM GRANULOCYTES NFR BLD AUTO: 0.3 % (ref 0–0.5)
LYMPHOCYTES # BLD AUTO: 2.26 10*3/MM3 (ref 0.7–3.1)
LYMPHOCYTES NFR BLD AUTO: 17.3 % (ref 19.6–45.3)
MCH RBC QN AUTO: 27.7 PG (ref 26.6–33)
MCHC RBC AUTO-ENTMCNC: 31 G/DL (ref 31.5–35.7)
MCV RBC AUTO: 89.4 FL (ref 79–97)
MONOCYTES # BLD AUTO: 0.96 10*3/MM3 (ref 0.1–0.9)
MONOCYTES NFR BLD AUTO: 7.3 % (ref 5–12)
NEUTROPHILS NFR BLD AUTO: 70 % (ref 42.7–76)
NEUTROPHILS NFR BLD AUTO: 9.15 10*3/MM3 (ref 1.7–7)
NRBC BLD AUTO-RTO: 0 /100 WBC (ref 0–0.2)
PLATELET # BLD AUTO: 299 10*3/MM3 (ref 140–450)
PMV BLD AUTO: 8.5 FL (ref 6–12)
POTASSIUM SERPL-SCNC: 4.9 MMOL/L (ref 3.5–5.2)
RBC # BLD AUTO: 3.1 10*6/MM3 (ref 4.14–5.8)
SODIUM SERPL-SCNC: 138 MMOL/L (ref 136–145)
WBC NRBC COR # BLD AUTO: 13.08 10*3/MM3 (ref 3.4–10.8)

## 2024-08-15 PROCEDURE — C1887 CATHETER, GUIDING: HCPCS | Performed by: SURGERY

## 2024-08-15 PROCEDURE — 25010000002 MIDAZOLAM PER 1MG: Performed by: SURGERY

## 2024-08-15 PROCEDURE — 75710 ARTERY X-RAYS ARM/LEG: CPT | Performed by: SURGERY

## 2024-08-15 PROCEDURE — 37228 PR REVSC OPN/PRQ TIB/PERO W/ANGIOPLASTY UNI: CPT | Performed by: SURGERY

## 2024-08-15 PROCEDURE — 25010000002 FENTANYL CITRATE (PF) 100 MCG/2ML SOLUTION: Performed by: SURGERY

## 2024-08-15 PROCEDURE — 0 IODIXANOL PER 1 ML: Performed by: SURGERY

## 2024-08-15 PROCEDURE — C1769 GUIDE WIRE: HCPCS | Performed by: SURGERY

## 2024-08-15 PROCEDURE — 99153 MOD SED SAME PHYS/QHP EA: CPT | Performed by: SURGERY

## 2024-08-15 PROCEDURE — C1894 INTRO/SHEATH, NON-LASER: HCPCS | Performed by: SURGERY

## 2024-08-15 PROCEDURE — C1760 CLOSURE DEV, VASC: HCPCS | Performed by: SURGERY

## 2024-08-15 PROCEDURE — C1725 CATH, TRANSLUMIN NON-LASER: HCPCS | Performed by: SURGERY

## 2024-08-15 PROCEDURE — 80048 BASIC METABOLIC PNL TOTAL CA: CPT | Performed by: SURGERY

## 2024-08-15 PROCEDURE — 25010000002 HEPARIN (PORCINE) PER 1000 UNITS: Performed by: SURGERY

## 2024-08-15 PROCEDURE — 25010000002 CEFAZOLIN PER 500 MG: Performed by: SURGERY

## 2024-08-15 PROCEDURE — 37224 PR REVSC OPN/PRG FEM/POP W/ANGIOPLASTY UNI: CPT | Performed by: SURGERY

## 2024-08-15 PROCEDURE — 85025 COMPLETE CBC W/AUTO DIFF WBC: CPT | Performed by: SURGERY

## 2024-08-15 PROCEDURE — 75625 CONTRAST EXAM ABDOMINL AORTA: CPT | Performed by: SURGERY

## 2024-08-15 PROCEDURE — 99152 MOD SED SAME PHYS/QHP 5/>YRS: CPT | Performed by: SURGERY

## 2024-08-15 RX ORDER — IODIXANOL 320 MG/ML
INJECTION, SOLUTION INTRAVASCULAR
Status: DISCONTINUED | OUTPATIENT
Start: 2024-08-15 | End: 2024-08-15 | Stop reason: HOSPADM

## 2024-08-15 RX ORDER — LIDOCAINE HYDROCHLORIDE 20 MG/ML
INJECTION, SOLUTION INFILTRATION; PERINEURAL
Status: DISCONTINUED | OUTPATIENT
Start: 2024-08-15 | End: 2024-08-15 | Stop reason: HOSPADM

## 2024-08-15 RX ORDER — HEPARIN SODIUM 1000 [USP'U]/ML
INJECTION, SOLUTION INTRAVENOUS; SUBCUTANEOUS
Status: DISCONTINUED | OUTPATIENT
Start: 2024-08-15 | End: 2024-08-15 | Stop reason: HOSPADM

## 2024-08-15 RX ORDER — SODIUM CHLORIDE 9 MG/ML
100 INJECTION, SOLUTION INTRAVENOUS CONTINUOUS
Status: CANCELLED | OUTPATIENT
Start: 2024-08-15 | End: 2024-08-15

## 2024-08-15 RX ORDER — MIDAZOLAM HYDROCHLORIDE 2 MG/2ML
INJECTION, SOLUTION INTRAMUSCULAR; INTRAVENOUS
Status: DISCONTINUED | OUTPATIENT
Start: 2024-08-15 | End: 2024-08-15 | Stop reason: HOSPADM

## 2024-08-15 RX ORDER — FENTANYL CITRATE 50 UG/ML
INJECTION, SOLUTION INTRAMUSCULAR; INTRAVENOUS
Status: DISCONTINUED | OUTPATIENT
Start: 2024-08-15 | End: 2024-08-15 | Stop reason: HOSPADM

## 2024-08-15 NOTE — DISCHARGE INSTRUCTIONS
DISCHARGE INSTRUCTIONS  VASCULAR  PROCEDURES      For your surgery you had:  IV sedation.     You may experience dizziness, drowsiness, or light-headedness for several hours following surgery/procedure.  Do not stay alone today or tonight.  Limit your activity for 24 hours.  Resume your diet slowly.  Follow whatever special dietary instructions you may have been given by your doctor.  You should not drive or operate machinery, drink alcohol, or sign legally binding documents for 24 hours or while you are taking pain medication.    NOTIFY YOUR DOCTOR IF YOU EXPERIENCE ANY OF THE FOLLOWING:  Temperature greater than 101 degrees Fahrenheit  Shaking Chills  Redness or excessive drainage from incision  Nausea, vomiting and/or pain that is not controlled by prescribed medications  Increase in bleeding or bleeding that is excessive  Unable to urinate in 6 hours after surgery  If unable to reach your doctor, please go to the closest Emergency Room  May remove dressing: in 24 hours  No heavy lifting greater than 15lbs for 3 days.  You may shower tomorrow, no submerging of incision for 2 weeks. (Pat site dry only)      Medications per physician instructions as indicated on After Visit Summary.    Last dose of pain medication was given at:     [x] [facility] VASCULAR CLOSURE DEVICE DISCHARGE INSTRUCTIONS:  Apply band-aid daily if needed for oozing or moisture, try to keep area clean and dry until scab has formed on site.  NO driving for 24 hours, avoid driving unless necessary and go slowly.  NO strenuous activity, exercise, pushing or pulling.  No heavy lifting greater than 10-15lbs. for 3 days.  Avoid stairs, if necessary go slowly.  While coughing, sneezing, or straining for bowel movement, apply pressure with palm of hand to site for 24 hours.  MD to release for work or sexual activity.      SPECIAL INSTRUCTIONS:  May remove dressing in 1 day and wash area.  Follow-up in the office in 2 weeks, call for appointment.  Resume  home diet.  Resume home medications.  No lifting greater than 15 pounds for 3 days.  May resume Eliquis on Friday, 8/16/2024.

## 2024-08-15 NOTE — Clinical Note
A 4 fr sheath was successfully inserted using micropuncture technique with ultrasound guidance into the right femoral artery.

## 2024-08-15 NOTE — PROGRESS NOTES
Angiogram performed.  60% left popliteal artery stenosis, angioplastied to 5 mm with approximately a 20% residual stenosis.  Multifocal moderate to severe disease of the posterior tibialis at the ankle, angioplastied with satisfactory results and no significant residual stenosis.  More distal moderate disease remains.  For details find full report under chart review, cardiology tab.

## 2024-08-16 ENCOUNTER — PATIENT OUTREACH (OUTPATIENT)
Dept: CASE MANAGEMENT | Facility: OTHER | Age: 59
End: 2024-08-16
Payer: COMMERCIAL

## 2024-08-16 DIAGNOSIS — Z74.09 IMPAIRED MOBILITY AND ENDURANCE: Primary | ICD-10-CM

## 2024-08-16 NOTE — OUTREACH NOTE
"AMBULATORY CASE MANAGEMENT NOTE    Names and Relationships of Patient/Support Persons: Contact: Preston Wallis \"Gómez\"; Relationship: Self -     Elizabeth called stating that Gómez's finger is drawing up some.  Hospital told them yesterday could be due to his shoulder.  Would like to have therapy again for right shoulder pain.  Informed Elizabeth that he may have to have a Telehelath to address issue for PT eval and treat.      Had aortagram yesterday.   Seeing pulmonology next week to discuss POC.    Will need to call [facility] and request meds on Aug 26  Informed Elizabeth to schedule appointment with podiatry to address foot drop issue.   Constipation better.     Tara GOMEZ  Ambulatory Case Management    8/16/2024, 15:30 EDT  "

## 2024-08-22 ENCOUNTER — TELEPHONE (OUTPATIENT)
Dept: PULMONOLOGY | Facility: CLINIC | Age: 59
End: 2024-08-22

## 2024-08-22 ENCOUNTER — OFFICE VISIT (OUTPATIENT)
Dept: PULMONOLOGY | Facility: CLINIC | Age: 59
End: 2024-08-22
Payer: COMMERCIAL

## 2024-08-22 ENCOUNTER — PATIENT OUTREACH (OUTPATIENT)
Dept: CASE MANAGEMENT | Facility: OTHER | Age: 59
End: 2024-08-22
Payer: COMMERCIAL

## 2024-08-22 VITALS
OXYGEN SATURATION: 99 % | HEART RATE: 76 BPM | BODY MASS INDEX: 35.7 KG/M2 | WEIGHT: 241 LBS | RESPIRATION RATE: 16 BRPM | HEIGHT: 69 IN | TEMPERATURE: 98 F | SYSTOLIC BLOOD PRESSURE: 136 MMHG | DIASTOLIC BLOOD PRESSURE: 53 MMHG

## 2024-08-22 DIAGNOSIS — J44.1 COPD EXACERBATION: ICD-10-CM

## 2024-08-22 DIAGNOSIS — K21.9 GASTROESOPHAGEAL REFLUX DISEASE, UNSPECIFIED WHETHER ESOPHAGITIS PRESENT: ICD-10-CM

## 2024-08-22 DIAGNOSIS — R06.2 WHEEZING: ICD-10-CM

## 2024-08-22 DIAGNOSIS — G47.33 OBSTRUCTIVE SLEEP APNEA: ICD-10-CM

## 2024-08-22 DIAGNOSIS — R05.3 CHRONIC COUGH: ICD-10-CM

## 2024-08-22 DIAGNOSIS — Z74.09 IMPAIRED MOBILITY AND ENDURANCE: Primary | ICD-10-CM

## 2024-08-22 DIAGNOSIS — Z86.711 PERSONAL HISTORY OF PE (PULMONARY EMBOLISM): Primary | ICD-10-CM

## 2024-08-22 DIAGNOSIS — J15.1 PNEUMONIA OF BOTH LUNGS DUE TO PSEUDOMONAS SPECIES, UNSPECIFIED PART OF LUNG: ICD-10-CM

## 2024-08-22 DIAGNOSIS — Z87.01 HISTORY OF PSEUDOMONAS PNEUMONIA: ICD-10-CM

## 2024-08-22 DIAGNOSIS — F17.201 TOBACCO ABUSE, IN REMISSION: ICD-10-CM

## 2024-08-22 DIAGNOSIS — J47.9 BRONCHIECTASIS WITHOUT COMPLICATION: ICD-10-CM

## 2024-08-22 DIAGNOSIS — J44.9 CHRONIC OBSTRUCTIVE PULMONARY DISEASE, UNSPECIFIED COPD TYPE: ICD-10-CM

## 2024-08-22 DIAGNOSIS — E66.01 CLASS 3 OBESITY: ICD-10-CM

## 2024-08-22 DIAGNOSIS — R06.09 CHRONIC DYSPNEA: ICD-10-CM

## 2024-08-22 DIAGNOSIS — J15.1 PNEUMONIA DUE TO PSEUDOMONAS SPECIES, UNSPECIFIED LATERALITY, UNSPECIFIED PART OF LUNG: ICD-10-CM

## 2024-08-22 RX ORDER — ARFORMOTEROL TARTRATE 15 UG/2ML
15 SOLUTION RESPIRATORY (INHALATION)
Qty: 360 ML | Refills: 3 | Status: SHIPPED | OUTPATIENT
Start: 2024-08-22

## 2024-08-22 RX ORDER — IPRATROPIUM BROMIDE AND ALBUTEROL SULFATE 2.5; .5 MG/3ML; MG/3ML
3 SOLUTION RESPIRATORY (INHALATION) EVERY 4 HOURS PRN
Qty: 360 ML | Refills: 5 | Status: SHIPPED | OUTPATIENT
Start: 2024-08-22

## 2024-08-22 RX ORDER — ALBUTEROL SULFATE 90 UG/1
2 AEROSOL, METERED RESPIRATORY (INHALATION) EVERY 4 HOURS PRN
COMMUNITY
End: 2024-08-22 | Stop reason: SDUPTHER

## 2024-08-22 RX ORDER — TOBRAMYCIN INHALATION SOLUTION 300 MG/5ML
300 INHALANT RESPIRATORY (INHALATION)
Qty: 300 ML | Refills: 11 | Status: SHIPPED | OUTPATIENT
Start: 2024-08-22 | End: 2024-09-21

## 2024-08-22 RX ORDER — BUDESONIDE 0.5 MG/2ML
0.5 INHALANT ORAL 2 TIMES DAILY
Qty: 360 ML | Refills: 3 | Status: SHIPPED | OUTPATIENT
Start: 2024-08-22

## 2024-08-22 RX ORDER — MONTELUKAST SODIUM 10 MG/1
10 TABLET ORAL NIGHTLY
Qty: 30 TABLET | Refills: 5 | Status: SHIPPED | OUTPATIENT
Start: 2024-08-22

## 2024-08-22 RX ORDER — ALBUTEROL SULFATE 90 UG/1
2 AEROSOL, METERED RESPIRATORY (INHALATION) EVERY 4 HOURS PRN
Qty: 18 G | Refills: 5 | Status: SHIPPED | OUTPATIENT
Start: 2024-08-22 | End: 2024-09-21

## 2024-08-22 RX ORDER — TOBRAMYCIN INHALATION SOLUTION 300 MG/5ML
300 INHALANT RESPIRATORY (INHALATION) DAILY
Qty: 150 ML | Refills: 10 | Status: CANCELLED | OUTPATIENT
Start: 2024-08-22

## 2024-08-22 NOTE — OUTREACH NOTE
AMBULATORY CASE MANAGEMENT NOTE    Names and Relationships of Patient/Support Persons: Contact: Roopa; Relationship:  -     Roopa informed that Gómez was discharged home on 2 antibiotics (doxycycline and Augmentin ) long term due to concern with his foot.     She was wanting to know if he should take a probiotic.  He would not be able to afford otc, so will ask vascular to send in script since they are writing the antibiotics.       Tara GOMEZ  Ambulatory Case Management    8/22/2024, 11:50 EDT  
analgesics, rest, podiatry follow up

## 2024-08-22 NOTE — TELEPHONE ENCOUNTER
Patient had a 6-minute walk test in the office today on 8/22/2024.  Patient continues to qualify for oxygen.  I have placed a new order for oxygen therapy as patient does need recertification for his oxygen sent to his DME company.  Please send over recertification to his DME company. Thank you.

## 2024-08-23 ENCOUNTER — TELEPHONE (OUTPATIENT)
Dept: WOUND CARE | Facility: HOSPITAL | Age: 59
End: 2024-08-23
Payer: COMMERCIAL

## 2024-08-23 NOTE — TELEPHONE ENCOUNTER
HH called wanting to know if you could call in a probiotic for the patient since he is on antibiotics for 6 weeks.     He is not having symptoms at the moment but she wanted to be proactive.

## 2024-08-26 ENCOUNTER — PATIENT OUTREACH (OUTPATIENT)
Dept: CASE MANAGEMENT | Facility: OTHER | Age: 59
End: 2024-08-26
Payer: COMMERCIAL

## 2024-08-26 DIAGNOSIS — L89.620 PRESSURE INJURY OF LEFT HEEL, UNSTAGEABLE: Primary | ICD-10-CM

## 2024-08-26 DIAGNOSIS — Z74.09 IMPAIRED MOBILITY AND ENDURANCE: Primary | ICD-10-CM

## 2024-08-26 RX ORDER — SACCHAROMYCES BOULARDII 250 MG
250 CAPSULE ORAL 2 TIMES DAILY
Qty: 60 CAPSULE | Refills: 0 | Status: SHIPPED | OUTPATIENT
Start: 2024-08-26 | End: 2024-09-25

## 2024-08-26 NOTE — OUTREACH NOTE
AMBULATORY CASE MANAGEMENT NOTE    Names and Relationships of Patient/Support Persons: Contact: [facility] - [place], [place] - [address] [phone]  - [phone] FX; Relationship: Pharmacy -     Called pharmacy to get refills for patient.     Education Documentation  No documentation found.        Tara GOMEZ  Ambulatory Case Management    8/26/2024, 13:43 EDT

## 2024-08-30 ENCOUNTER — PATIENT OUTREACH (OUTPATIENT)
Dept: CASE MANAGEMENT | Facility: OTHER | Age: 59
End: 2024-08-30
Payer: COMMERCIAL

## 2024-08-30 DIAGNOSIS — Z79.4 TYPE 2 DIABETES MELLITUS WITH HYPERGLYCEMIA, WITH LONG-TERM CURRENT USE OF INSULIN: ICD-10-CM

## 2024-08-30 DIAGNOSIS — Z71.89 MEDICATION CARE PLAN DISCUSSED WITH PATIENT: ICD-10-CM

## 2024-08-30 DIAGNOSIS — E11.65 TYPE 2 DIABETES MELLITUS WITH HYPERGLYCEMIA, WITH LONG-TERM CURRENT USE OF INSULIN: ICD-10-CM

## 2024-08-30 DIAGNOSIS — Z74.09 IMPAIRED MOBILITY AND ENDURANCE: Primary | ICD-10-CM

## 2024-08-30 DIAGNOSIS — N18.31 STAGE 3A CHRONIC KIDNEY DISEASE: ICD-10-CM

## 2024-08-30 NOTE — OUTREACH NOTE
AMBULATORY CASE MANAGEMENT NOTE    Names and Relationships of Patient/Support Persons:  -     Helped Kami with meds today.  Helped with tapering with Keppra.  Received new planners.    Informed pharmacy of discontinuation.    Tara GOMEZ  Ambulatory Case Management    8/30/2024, 15:25 EDT    [facility] End of Month Documentation    This Chronic Medical Management Care Plan for Preston Wallis, 59 y.o. male, has been monitored and managed; reviewed and a new plan of care implemented for the month of August.  A cumulative time of 87  minutes was spent on this patient record this month, including phone call with care giver; electronic communication with primary care provider; electronic communication with pharmacist; chart review; phone call with patient.    Regarding the patient's problems: has Chronic cough; Pneumonia due to COVID-19 virus; Polyneuropathy; Paroxysmal atrial fibrillation; Obstructive sleep apnea; MRSA pneumonia; Low back pain; Chronic diastolic heart failure; Allergies; COPD exacerbation; Chronic anticoagulation; Benign prostatic hyperplasia; Difficulty using continuous positive airway pressure (CPAP) device; Stage 3a chronic kidney disease; Iron deficiency anemia secondary to inadequate dietary iron intake; Vitamin D deficiency; Class 3 severe obesity with serious comorbidity in adult; Lower extremity edema; Elevated alkaline phosphatase level; Venous insufficiency (chronic) (peripheral); Tobacco abuse, in remission; History of Pseudomonas pneumonia; Chronic dyspnea; Gastroesophageal reflux disease; Bronchiectasis without complication; ERIC (acute kidney injury); Altered mental status; Hyperlipidemia; Luetscher's syndrome; Neurogenic bladder; Class 1 obesity; Pneumonia due to Pseudomonas species; Seizures; Primary osteoarthritis of left knee; Other constipation; Chronic obstructive pulmonary disease; Type 2 diabetes mellitus with hyperglycemia, with long-term current use of insulin; Essential hypertension;  Stage 3b chronic kidney disease; Annual physical exam; Long-term use of high-risk medication; Personal history of PE (pulmonary embolism); Encounter for aftercare for healing closed traumatic fracture of left femur; Chronic pain of left knee; Primary osteoarthritis of right knee; Sepsis; Bronchiectasis with acute exacerbation; Bacterial pneumonia; Anemia; Closed fracture of left tibial plateau; Septic joint; Septic arthritis; Skin ulcer of left heel, limited to breakdown of skin; Ulcer of left foot, limited to breakdown of skin; Left foot pain; Wheezing; Acute on chronic respiratory failure with hypercapnia; Chronic respiratory failure with hypoxia and hypercapnia; Acute hypoxic on chronic hypercapnic respiratory failure; Impaired cognition; and Atherosclerosis of native arteries of the extremities with ulceration on their problem list., the following items were addressed: medications; transitions to medical care; medical records; referrals to community service providers and any changes can be found within the plan section of the note.  A detailed listing of time spent for chronic care management is tracked within each outreach encounter.  Current medications include:  has a current medication list which includes the following prescription(s): albuterol sulfate hfa, amantadine, apixaban, arformoterol, aspirin, atorvastatin, budesonide, bumetanide, buspirone, calcium citrate, carvedilol, vitamin d3, dapagliflozin, docusate sodium, duloxetine, famotidine, ferrous gluconate, finasteride, folic acid, levemir, insulin lispro (1 unit dial), ipratropium-albuterol, isopropyl myristate, ketoconazole, magnesium oxide, montelukast, multivitamin with iron, nifedipine xl, o2, saccharomyces boulardii, ozempic (0.25 or 0.5 mg/dose), tamsulosin, tobramycin pf, trazodone, and vitamin c. and the patient is reported to be caregiver will take responsibility for med compliance; patient is compliant with medication protocol,  Medications  are reported to be non-effective in controlling symptoms and changes have been made to the medication protocol, Pain.  Regarding these diagnoses, referrals were made to the following provider(s):  specialists.  All notes on chart for PCP to review.    The patient was monitored remotely for pain; medications; blood glucose; mood & behavior.    The patient's physical needs include:  help taking medications as prescribed; medication education; needs assistance with ADLs; resources for disability needs; DME supplies.     The patient's mental support needs include:  continued support    The patient's cognitive support needs include:  medication; continued support; needs assistance with ADLs; health care    The patient's psychosocial support needs include:  continued support; coordination of community providers; medication management or adherence    The patient's functional needs include: health care coverage; medication education; needs assistance for ADLs; physical healthcare; physician referral; resources for disability needs    The patient's environmental needs include:  resources for disability needs; no involvement in outside activities or no access to other activities    Care Plan overall comments:  Still not at goal, however improving. will continue to follow. Has many upcoming appointments and referrals to specialists. A1c not at goal, continuing ways to improve with addition of new medications. Will follow.    Refer to previous outreach notes for more information on the areas listed above.    Monthly Billing Diagnoses  (Z74.09) Impaired mobility and endurance    (Z71.89) Medication care plan discussed with patient    (N18.31) Stage 3a chronic kidney disease    (E11.65,  Z79.4) Type 2 diabetes mellitus with hyperglycemia, with long-term current use of insulin    Medications   Medications have been reconciled    Care Plan progress this month:      Recently Modified Care Plans Updates made since 7/30/2024 12:00 AM       No recently modified care plans.            Current Specialty Plan of Care Status signed by both patient and provider    Instructions   Patient was provided an electronic copy of care plan  CCM services were explained and offered and patient has accepted these services.  Patient has given their written consent to receive CCM services and understands that this includes the authorization of electronic communication of medical information with the other treating providers.  Patient understands that they may stop CCM services at any time and these changes will be effective at the end of the calendar month and will effectively revocate the agreement of CCM services.  Patient understands that only one practitioner can furnish and be paid for CCM services during one calendar month.  Patient also understands that there may be co-payment or deductible fees in association with CCM services.  Patient will continue with at least monthly follow-up calls with the Ambulatory .    Tara GOMEZ  Ambulatory Case Management    8/30/2024, 15:25 EDT

## 2024-09-10 ENCOUNTER — PATIENT OUTREACH (OUTPATIENT)
Dept: CASE MANAGEMENT | Facility: OTHER | Age: 59
End: 2024-09-10
Payer: COMMERCIAL

## 2024-09-10 DIAGNOSIS — J96.12 CHRONIC RESPIRATORY FAILURE WITH HYPOXIA AND HYPERCAPNIA: Primary | ICD-10-CM

## 2024-09-10 DIAGNOSIS — J96.11 CHRONIC RESPIRATORY FAILURE WITH HYPOXIA AND HYPERCAPNIA: Primary | ICD-10-CM

## 2024-09-10 NOTE — PROGRESS NOTES
It is okay with me to move ahead with portable chest x-ray.  I will not be able to review the results.  You will need to forwarded to covering physician at that time.  Thanks.

## 2024-09-10 NOTE — OUTREACH NOTE
AMBULATORY CASE MANAGEMENT NOTE    Names and Relationships of Patient/Support Persons:  -     Roopa called from home health requesting an order for [facility] x-ray CXR - he has wheezing, rhonchi and crackles throughout all lobes.  O2 - 98% on 2L.  No fever.  Chills however.  Reports increased SOA.   Swelling, right 3+, left 4+ ( This is not a new finding)     Jorgito Ames MD  You; [facility] B31 minutes ago (3:34 PM)       It is okay with me to move ahead with portable chest x-ray.  I will not be able to review the results.  You will need to forwarded to covering physician at that time.  Thanks.        Roopa informed.  She is ordering [facility] x-ray.  Will follow.     He cancelled his appointment with wound care today due to not feeling well.     He needs to reschedule appointment with cardiology.  He says he is compliant with pulmonary neb treatments.   Will discuss with pulmonary when results result.     Tara GOMEZ  Ambulatory Case Management    9/10/2024, 15:14 EDT    AMBULATORY CASE MANAGEMENT NOTE    Names and Relationships of Patient/Support Persons:  -     Roopa called to report that CXR report ( being faxed to MD office) - results [facility] OPACITIES, CONCERN FOR PNEUMONIA - Will send to griselda provider  Patient is already on Augmentin and Doxycycline, Tobramycin nebs x 42 days.   Spoke with Dr. Lia villalobos, continue with aggressive pulmonary toilet over the weekend and if worsens go to ER.    Spoke with Valerie manriquez, verbalized understanding.         Tara GOMEZ  Ambulatory Case Management    9/13/2024, 15:42 EDT

## 2024-09-12 NOTE — PROGRESS NOTES
Confirmed with yahaira if she has gotten result yet.  They have not gone out despite her making the referral stat.  Will wait for results.

## 2024-09-12 NOTE — PROGRESS NOTES
I spoke with home health nurse Roopa on 9/10/24- she was to order from [facility] x-ray   Clofazimine Counseling:  I discussed with the patient the risks of clofazimine including but not limited to skin and eye pigmentation, liver damage, nausea/vomiting, gastrointestinal bleeding and allergy.

## 2024-09-13 ENCOUNTER — PATIENT OUTREACH (OUTPATIENT)
Dept: CASE MANAGEMENT | Facility: OTHER | Age: 59
End: 2024-09-13
Payer: COMMERCIAL

## 2024-09-13 DIAGNOSIS — J96.11 CHRONIC RESPIRATORY FAILURE WITH HYPOXIA AND HYPERCAPNIA: Primary | ICD-10-CM

## 2024-09-13 DIAGNOSIS — J96.12 CHRONIC RESPIRATORY FAILURE WITH HYPOXIA AND HYPERCAPNIA: Primary | ICD-10-CM

## 2024-09-17 ENCOUNTER — OUTSIDE FACILITY SERVICE (OUTPATIENT)
Dept: FAMILY MEDICINE CLINIC | Age: 59
End: 2024-09-17
Payer: COMMERCIAL

## 2024-09-24 ENCOUNTER — TELEPHONE (OUTPATIENT)
Dept: UROLOGY | Facility: CLINIC | Age: 59
End: 2024-09-24

## 2024-09-24 DIAGNOSIS — E11.65 TYPE 2 DIABETES MELLITUS WITH HYPERGLYCEMIA, WITH LONG-TERM CURRENT USE OF INSULIN: ICD-10-CM

## 2024-09-24 DIAGNOSIS — Z79.4 TYPE 2 DIABETES MELLITUS WITH HYPERGLYCEMIA, WITH LONG-TERM CURRENT USE OF INSULIN: ICD-10-CM

## 2024-09-24 RX ORDER — SEMAGLUTIDE 0.68 MG/ML
0.25 INJECTION, SOLUTION SUBCUTANEOUS WEEKLY
Qty: 3 ML | Refills: 0 | Status: SHIPPED | OUTPATIENT
Start: 2024-09-24

## 2024-09-24 NOTE — TELEPHONE ENCOUNTER
Caller: Kami Wallis     Relationship: Wife     Best call back number: [phone]     Who are you requesting to speak with (clinical staff, provider,  specific staff member): Dr White     What was the call regarding:  Pt is out of catheters and are wanting to know if we can order them a new supply. Pt usually gets them delivered via  Medical.

## 2024-09-26 ENCOUNTER — OFFICE VISIT (OUTPATIENT)
Dept: WOUND CARE | Facility: HOSPITAL | Age: 59
End: 2024-09-26
Payer: COMMERCIAL

## 2024-09-26 VITALS
TEMPERATURE: 97.8 F | DIASTOLIC BLOOD PRESSURE: 58 MMHG | HEART RATE: 76 BPM | RESPIRATION RATE: 18 BRPM | SYSTOLIC BLOOD PRESSURE: 134 MMHG

## 2024-09-26 DIAGNOSIS — L89.893 PRESSURE INJURY OF LEFT FOOT, STAGE 3: ICD-10-CM

## 2024-09-26 DIAGNOSIS — Z98.890 PERIPHERAL ARTERIAL DISEASE WITH HISTORY OF REVASCULARIZATION: ICD-10-CM

## 2024-09-26 DIAGNOSIS — E11.621 TYPE 2 DIABETES MELLITUS WITH FOOT ULCER, WITH LONG-TERM CURRENT USE OF INSULIN: ICD-10-CM

## 2024-09-26 DIAGNOSIS — E11.621 DIABETIC ULCER OF LEFT HEEL ASSOCIATED WITH TYPE 2 DIABETES MELLITUS, WITH NECROSIS OF BONE: ICD-10-CM

## 2024-09-26 DIAGNOSIS — J96.11 CHRONIC RESPIRATORY FAILURE WITH HYPOXIA, ON HOME O2 THERAPY: ICD-10-CM

## 2024-09-26 DIAGNOSIS — L89.620 PRESSURE INJURY OF LEFT HEEL, UNSTAGEABLE: Primary | ICD-10-CM

## 2024-09-26 DIAGNOSIS — Z99.81 CHRONIC RESPIRATORY FAILURE WITH HYPOXIA, ON HOME O2 THERAPY: ICD-10-CM

## 2024-09-26 DIAGNOSIS — Z79.4 TYPE 2 DIABETES MELLITUS WITH FOOT ULCER, WITH LONG-TERM CURRENT USE OF INSULIN: ICD-10-CM

## 2024-09-26 DIAGNOSIS — J44.9 CHRONIC OBSTRUCTIVE PULMONARY DISEASE, UNSPECIFIED COPD TYPE: ICD-10-CM

## 2024-09-26 DIAGNOSIS — I50.32 CHRONIC DIASTOLIC HEART FAILURE: ICD-10-CM

## 2024-09-26 DIAGNOSIS — L97.424 DIABETIC ULCER OF LEFT HEEL ASSOCIATED WITH TYPE 2 DIABETES MELLITUS, WITH NECROSIS OF BONE: ICD-10-CM

## 2024-09-26 DIAGNOSIS — L97.509 TYPE 2 DIABETES MELLITUS WITH FOOT ULCER, WITH LONG-TERM CURRENT USE OF INSULIN: ICD-10-CM

## 2024-09-26 DIAGNOSIS — I73.9 PERIPHERAL ARTERIAL DISEASE WITH HISTORY OF REVASCULARIZATION: ICD-10-CM

## 2024-09-26 PROCEDURE — 1160F RVW MEDS BY RX/DR IN RCRD: CPT | Performed by: EMERGENCY MEDICINE

## 2024-09-26 PROCEDURE — 3075F SYST BP GE 130 - 139MM HG: CPT | Performed by: EMERGENCY MEDICINE

## 2024-09-26 PROCEDURE — 1159F MED LIST DOCD IN RCRD: CPT | Performed by: EMERGENCY MEDICINE

## 2024-09-26 PROCEDURE — 3078F DIAST BP <80 MM HG: CPT | Performed by: EMERGENCY MEDICINE

## 2024-09-27 ENCOUNTER — PATIENT OUTREACH (OUTPATIENT)
Dept: CASE MANAGEMENT | Facility: OTHER | Age: 59
End: 2024-09-27
Payer: COMMERCIAL

## 2024-09-27 DIAGNOSIS — J96.12 CHRONIC RESPIRATORY FAILURE WITH HYPOXIA AND HYPERCAPNIA: Primary | ICD-10-CM

## 2024-09-27 DIAGNOSIS — Z79.4 TYPE 2 DIABETES MELLITUS WITH HYPERGLYCEMIA, WITH LONG-TERM CURRENT USE OF INSULIN: ICD-10-CM

## 2024-09-27 DIAGNOSIS — Z74.09 IMPAIRED MOBILITY AND ENDURANCE: ICD-10-CM

## 2024-09-27 DIAGNOSIS — N18.31 STAGE 3A CHRONIC KIDNEY DISEASE: ICD-10-CM

## 2024-09-27 DIAGNOSIS — E11.65 TYPE 2 DIABETES MELLITUS WITH HYPERGLYCEMIA, WITH LONG-TERM CURRENT USE OF INSULIN: ICD-10-CM

## 2024-09-27 DIAGNOSIS — Z71.89 MEDICATION CARE PLAN DISCUSSED WITH PATIENT: ICD-10-CM

## 2024-09-27 DIAGNOSIS — J96.11 CHRONIC RESPIRATORY FAILURE WITH HYPOXIA AND HYPERCAPNIA: Primary | ICD-10-CM

## 2024-10-03 ENCOUNTER — PATIENT OUTREACH (OUTPATIENT)
Dept: CASE MANAGEMENT | Facility: OTHER | Age: 59
End: 2024-10-03
Payer: COMMERCIAL

## 2024-10-03 DIAGNOSIS — Z79.4 TYPE 2 DIABETES MELLITUS WITH HYPERGLYCEMIA, WITH LONG-TERM CURRENT USE OF INSULIN: ICD-10-CM

## 2024-10-03 DIAGNOSIS — Z71.89 MEDICATION CARE PLAN DISCUSSED WITH PATIENT: Primary | ICD-10-CM

## 2024-10-03 DIAGNOSIS — E11.65 TYPE 2 DIABETES MELLITUS WITH HYPERGLYCEMIA, WITH LONG-TERM CURRENT USE OF INSULIN: ICD-10-CM

## 2024-10-03 NOTE — OUTREACH NOTE
AMBULATORY CASE MANAGEMENT NOTE    Names and Relationships of Patient/Support Persons:  -     Kami brought me in medications to review.  He needed refills on all meds.  Called pharmacy and reviewed and updated any changes.    [facility] notified  that Gómez will be discharged from home health.  He is going to need outpatient scheduled for his port flush.  Will discuss with pcp and see if she can place order at next office visit - added to appointment notes.   Critical access hospital tells me now that nursing is staying in, so no need to worry about port at this time.      Tara GOMEZ  Ambulatory Case Management    10/3/2024, 09:01 EDT

## 2024-10-22 ENCOUNTER — HOSPITAL ENCOUNTER (OUTPATIENT)
Dept: CT IMAGING | Facility: HOSPITAL | Age: 59
Discharge: HOME | End: 2024-10-22
Admitting: NURSE PRACTITIONER
Payer: COMMERCIAL

## 2024-10-22 ENCOUNTER — OFFICE VISIT (OUTPATIENT)
Dept: FAMILY MEDICINE CLINIC | Age: 59
End: 2024-10-22
Payer: COMMERCIAL

## 2024-10-22 VITALS
DIASTOLIC BLOOD PRESSURE: 75 MMHG | OXYGEN SATURATION: 95 % | BODY MASS INDEX: 35.57 KG/M2 | HEART RATE: 87 BPM | HEIGHT: 69 IN | SYSTOLIC BLOOD PRESSURE: 144 MMHG

## 2024-10-22 DIAGNOSIS — Z86.711 PERSONAL HISTORY OF PE (PULMONARY EMBOLISM): ICD-10-CM

## 2024-10-22 DIAGNOSIS — R06.2 WHEEZING: ICD-10-CM

## 2024-10-22 DIAGNOSIS — J44.9 CHRONIC OBSTRUCTIVE PULMONARY DISEASE, UNSPECIFIED COPD TYPE: ICD-10-CM

## 2024-10-22 DIAGNOSIS — M19.011 PRIMARY OSTEOARTHRITIS OF RIGHT SHOULDER: ICD-10-CM

## 2024-10-22 DIAGNOSIS — E55.9 VITAMIN D DEFICIENCY: ICD-10-CM

## 2024-10-22 DIAGNOSIS — K21.9 GASTROESOPHAGEAL REFLUX DISEASE, UNSPECIFIED WHETHER ESOPHAGITIS PRESENT: ICD-10-CM

## 2024-10-22 DIAGNOSIS — E11.621 DIABETIC ULCER OF LEFT HEEL ASSOCIATED WITH TYPE 2 DIABETES MELLITUS, WITH FAT LAYER EXPOSED: ICD-10-CM

## 2024-10-22 DIAGNOSIS — L97.422 DIABETIC ULCER OF LEFT HEEL ASSOCIATED WITH TYPE 2 DIABETES MELLITUS, WITH FAT LAYER EXPOSED: ICD-10-CM

## 2024-10-22 DIAGNOSIS — Z23 ENCOUNTER FOR IMMUNIZATION: ICD-10-CM

## 2024-10-22 DIAGNOSIS — Z79.4 TYPE 2 DIABETES MELLITUS WITH HYPERGLYCEMIA, WITH LONG-TERM CURRENT USE OF INSULIN: Primary | ICD-10-CM

## 2024-10-22 DIAGNOSIS — G47.00 INSOMNIA, UNSPECIFIED TYPE: ICD-10-CM

## 2024-10-22 DIAGNOSIS — E78.2 MIXED HYPERLIPIDEMIA: ICD-10-CM

## 2024-10-22 DIAGNOSIS — J47.9 BRONCHIECTASIS WITHOUT COMPLICATION: ICD-10-CM

## 2024-10-22 DIAGNOSIS — R29.898 WEAKNESS OF BOTH LOWER EXTREMITIES: ICD-10-CM

## 2024-10-22 DIAGNOSIS — N18.32 STAGE 3B CHRONIC KIDNEY DISEASE: ICD-10-CM

## 2024-10-22 DIAGNOSIS — E11.65 TYPE 2 DIABETES MELLITUS WITH HYPERGLYCEMIA, WITH LONG-TERM CURRENT USE OF INSULIN: Primary | ICD-10-CM

## 2024-10-22 DIAGNOSIS — G47.33 OBSTRUCTIVE SLEEP APNEA: ICD-10-CM

## 2024-10-22 DIAGNOSIS — F17.201 TOBACCO ABUSE, IN REMISSION: ICD-10-CM

## 2024-10-22 DIAGNOSIS — I10 ESSENTIAL HYPERTENSION: ICD-10-CM

## 2024-10-22 DIAGNOSIS — R06.09 CHRONIC DYSPNEA: ICD-10-CM

## 2024-10-22 DIAGNOSIS — R05.3 CHRONIC COUGH: ICD-10-CM

## 2024-10-22 DIAGNOSIS — I50.32 CHRONIC DIASTOLIC (CONGESTIVE) HEART FAILURE: ICD-10-CM

## 2024-10-22 DIAGNOSIS — I48.0 PAROXYSMAL ATRIAL FIBRILLATION: ICD-10-CM

## 2024-10-22 DIAGNOSIS — J15.1 PNEUMONIA OF BOTH LUNGS DUE TO PSEUDOMONAS SPECIES, UNSPECIFIED PART OF LUNG: ICD-10-CM

## 2024-10-22 DIAGNOSIS — I87.2 VENOUS INSUFFICIENCY (CHRONIC) (PERIPHERAL): ICD-10-CM

## 2024-10-22 LAB
EXPIRATION DATE: ABNORMAL
HBA1C MFR BLD: 7.5 % (ref 4.5–5.7)
Lab: ABNORMAL

## 2024-10-22 PROCEDURE — 90480 ADMN SARSCOV2 VAC 1/ONLY CMP: CPT | Performed by: FAMILY MEDICINE

## 2024-10-22 PROCEDURE — 3051F HG A1C>EQUAL 7.0%<8.0%: CPT | Performed by: FAMILY MEDICINE

## 2024-10-22 PROCEDURE — 90471 IMMUNIZATION ADMIN: CPT | Performed by: FAMILY MEDICINE

## 2024-10-22 PROCEDURE — 83036 HEMOGLOBIN GLYCOSYLATED A1C: CPT | Performed by: FAMILY MEDICINE

## 2024-10-22 PROCEDURE — 90750 HZV VACC RECOMBINANT IM: CPT | Performed by: FAMILY MEDICINE

## 2024-10-22 PROCEDURE — 3078F DIAST BP <80 MM HG: CPT | Performed by: FAMILY MEDICINE

## 2024-10-22 PROCEDURE — 99214 OFFICE O/P EST MOD 30 MIN: CPT | Performed by: FAMILY MEDICINE

## 2024-10-22 PROCEDURE — 1170F FXNL STATUS ASSESSED: CPT | Performed by: FAMILY MEDICINE

## 2024-10-22 PROCEDURE — 90662 IIV NO PRSV INCREASED AG IM: CPT | Performed by: FAMILY MEDICINE

## 2024-10-22 PROCEDURE — 3077F SYST BP >= 140 MM HG: CPT | Performed by: FAMILY MEDICINE

## 2024-10-22 PROCEDURE — 91320 SARSCV2 VAC 30MCG TRS-SUC IM: CPT | Performed by: FAMILY MEDICINE

## 2024-10-22 PROCEDURE — 71250 CT THORAX DX C-: CPT

## 2024-10-22 PROCEDURE — 90472 IMMUNIZATION ADMIN EACH ADD: CPT | Performed by: FAMILY MEDICINE

## 2024-10-22 PROCEDURE — 1125F AMNT PAIN NOTED PAIN PRSNT: CPT | Performed by: FAMILY MEDICINE

## 2024-10-22 RX ORDER — LEVETIRACETAM 500 MG/1
TABLET ORAL
COMMUNITY
Start: 2024-08-26 | End: 2024-10-25 | Stop reason: ALTCHOICE

## 2024-10-22 RX ORDER — SEMAGLUTIDE 0.68 MG/ML
0.5 INJECTION, SOLUTION SUBCUTANEOUS WEEKLY
Qty: 3 ML | Refills: 2 | Status: SHIPPED | OUTPATIENT
Start: 2024-10-22

## 2024-10-22 RX ORDER — TRAZODONE HYDROCHLORIDE 50 MG/1
100 TABLET, FILM COATED ORAL NIGHTLY
Qty: 60 TABLET | Refills: 3 | Status: SHIPPED | OUTPATIENT
Start: 2024-10-22

## 2024-10-22 NOTE — PROGRESS NOTES
Preston Wallis presents to [facility].    Chief Complaint: diabetes follow up, LE weakness    Subjective     History of Present Illness:  HPI  I am seeing Gómez today for his ongoing generalized lower extremity weakness as well as heel diabetic pressure sore on his left foot.  He is currently under treatment with wound care and does not feel like he has any signs or symptoms of infection.  He is nonweightbearing on the left foot.  Because of this, he has difficulty completing his activities of daily living independently in the home due to his generalized lower extremity weakness.  He would greatly benefit from a motorized scooter to help him to be able to perform his ADLs efficiently.  He is capable mentally to drive this device.  He is unable to use a walker or cane due to high fall risk.  He also cannot safely use a wheelchair independently due to R shoulder OA with pain. He also has COPD and gets SOA and winded easily    He c/o today of poor sleep at night, can't fall asleep until 4 am and wakes up at 8 am.  Does not nap often and if so only for  20 min.  He has stopped coffee.  He plays phone at night and watches TV-advised him today this is not healthy sleep hygiene.  In addition he has peripheral neuropathy pain which keeps him awake.  In addition he has sleep apnea and does not tolerate CPAP.  He is off    He presents with chronic COPD, he is on 2 L continuous oxygen.  Today he is currently stable and has no acute concerns. He does not have any respiratory distress.  He is currently stable on Brovana, Proventil, Pulmicort, Symbicort, Bumex, DuoNebs, Singulair and 2 L of oxygen at home.       He presents with type 2 diabetes now insulin requiring and his blood sugars have been stable and well-controlled with ozempic 0.5 mg (needs this reflected in his prescription to the pharmacy), Farxiga,  long-acting Levemir 10 units daily and short acting lispro SSI 2-6 units with meals and  QHS.  He has not been compliant with his insulin, he tolerates meds well.  He uses a continuous glucose monitor and his BS ranged from .  Hemoglobin A1c today it is much much better at 7.5%!  He is aware he needs to be seen in the eye doctor yearly to rule out diabetic retinopathy.  Foot exam reveals no feeling in the feet bilaterally with pressure sores on the left foot.    He also presents with chronic hypercholesterolemia and is currently stable on atorvastatin and tolerates meds well.     His chronic anxiety with depression are not well controlledand he is stable and doing well with Cymbalta and BuSpar.  He is sleeping well at night with trazodone.    He presents with chronic hypertension remains stable on Coreg, nifedipine and he tolerates meds well, he is to follow healthy diet and avoid NA in diet      He has an underlying seizure disorder and has been seizure-free on Keppra 500 mg twice daily.  He is to follow-up with neurology as directed     He has chronic BPH is stable on Proscar.  He also presents with stage 3 b kidney failure, sees nephrology, sees Dr Martínez, renal function is stable, he is to avoid NSAIDs and push fluids     On Vit D supplementation and tolerates well.  Vitamin D labs reviewed and no changes needed current treatment plan     He has chronic Gerd is stable on pepcid, he is to avoid spicy and acidic foods      He is constipation is stable on RADHA     I truly appreciate Sima and her care and coordinating all of his medication       Result Review   The following data was reviewed by Kimmy Riley MD on 10/22/2024.  Lab Results   Component Value Date    WBC 13.08 (H) 08/15/2024    HGB 8.6 (L) 08/15/2024    HCT 27.7 (L) 08/15/2024    MCV 89.4 08/15/2024     08/15/2024     Lab Results   Component Value Date    GLUCOSE 221 (H) 08/15/2024    BUN 49 (H) 08/15/2024    CREATININE 2.46 (H) 08/15/2024     08/15/2024    K 4.9 08/15/2024    CL 96 (L) 08/15/2024    CALCIUM 9.7  08/15/2024    PROTEINTOT 8.3 07/18/2024    ALBUMIN 3.7 07/18/2024    ALT 25 07/18/2024    AST 27 07/18/2024    ALKPHOS 196 (H) 07/18/2024    BILITOT <0.2 07/18/2024    GLOB 4.6 07/18/2024    AGRATIO 0.8 07/18/2024    BCR 19.9 08/15/2024    ANIONGAP 10.4 08/15/2024    EGFR 29.4 (L) 08/15/2024     Lab Results   Component Value Date    CHOL 109 07/18/2024    CHLPL 165 08/26/2020    TRIG 117 07/18/2024    HDL 36 (L) 07/18/2024    LDL 52 07/18/2024     Lab Results   Component Value Date    TSH 1.497 04/13/2024     Lab Results   Component Value Date    HGBA1C 7.5 (A) 10/22/2024     Lab Results   Component Value Date    PSA 0.206 07/18/2024    PSA 0.203 05/25/2023    PSA 0.725 03/17/2022     Lab Results   Component Value Date    IRON 67 07/18/2024      Lab Results   Component Value Date    AHDE97ZT 41.4 07/18/2024               Assessment and Plan:   Diagnoses and all orders for this visit:    1. Type 2 diabetes mellitus with hyperglycemia, with long-term current use of insulin (Primary)  Comments:  Improving!  Referral placed for him to get in with Neli Paredes, he missed his last appointment, hemoglobin A1c at 7.5%. Rec yearly eye exam and diabetic shoes  Orders:  -     POC Glycosylated Hemoglobin (Hb A1C)  -     Ambulatory Referral to [facility]  -     Semaglutide,0.25 or 0.5MG/DOS, (Ozempic, 0.25 or 0.5 MG/DOSE,) 2 MG/3ML solution pen-injector; Inject 0.5 mg under the skin into the appropriate area as directed 1 (One) Time Per Week. Dx: E11.65  Indications: Type 2 Diabetes  Dispense: 3 mL; Refill: 2    2. Paroxysmal atrial fibrillation  Comments:  In normal sinus rhythm today.  He is to follow-up with cardiology as directed.  No changes in current meds or treatment plan    3. Encounter for immunization  Comments:  He was given Shingrix, COVID and flu vaccination today.  His immunizations are now up-to-date  Orders:  -     COVID-19 (Pfizer) 12yrs+ (COMIRNATY)  -     Fluzone High-Dose 65+yrs  ([id number])  -     Shingrix Vaccine    4. Mixed hyperlipidemia  Comments:  Stable on atorvastatin.  Labs reviewed no changes needed current meds or treatment plan.  Continue low-cholesterol diet    5. Venous insufficiency (chronic) (peripheral)  Comments:  Continue to wear compression stockings and elevate legs    6. Essential hypertension  Comments:  Blood pressure is borderline elevated.  He is to check home BP and call if it remains> 140/90 and avoid sodium in diet.  Continue current meds    7. Vitamin D deficiency  Comments:  Stable on vitamin D supplementation.  No changes to current meds or treatment plan    8. Stage 3b chronic kidney disease  Comments:  Stable.  He is avoid NSAIDs and push fluids 64 ounces a day    9. Diabetic ulcer of left heel associated with type 2 diabetes mellitus, with fat layer exposed  Comments:  Continue ongoing care with wound care.  Orders:  -     Ambulatory Referral to [facility]    10. Weakness of both lower extremities  Comments:  recommend motorized scooter to help him with his ADLs    11. Primary osteoarthritis of right shoulder  Comments:  recommend motorized scooter to help him with his ADLs    12. Insomnia, unspecified type  Comments:  Will increase trazodone to 100 mg nightly and recommend good sleep hygiene.  Handout on good sleep hygiene placed in my chart  Orders:  -     traZODone (DESYREL) 50 MG tablet; Take 2 tablets by mouth Every Night.  Dispense: 60 tablet; Refill: 3    13. Chronic diastolic (congestive) heart failure  Comments:  No acute issues today.  Continue compression stockings and Bumex for lower extremity edema    14. Chronic obstructive pulmonary disease, unspecified COPD type  Comments:  Follow-up with pulmonology as directed and continue pulmonary toilet.  He is stable today with no acute issues              Objective     Medications:  Current Outpatient Medications   Medication Instructions    amantadine (SYMMETREL) 100 MG tablet      apixaban (ELIQUIS) 5 mg, Oral, Every 12 Hours    arformoterol (BROVANA) 15 mcg, Nebulization, 2 Times Daily - RT    aspirin 81 mg, Oral, Every Morning    atorvastatin (LIPITOR) 20 mg, Oral, Nightly    budesonide (PULMICORT) 0.5 mg, Nebulization, 2 Times Daily    bumetanide (BUMEX) 2 mg, Oral, 2 Times Daily    busPIRone (BUSPAR) 15 mg, Oral, 2 Times Daily    calcium citrate (CALCITRATE) 950 mg, Oral, Nightly    carvedilol (COREG) 25 mg, Oral, Every 12 Hours Scheduled    dapagliflozin (FARXIGA) 5 mg, Oral, Every Morning    docusate sodium (COLACE) 100 mg, Oral, 2 Times Daily    DULoxetine (CYMBALTA) 60 mg, Oral, Daily    famotidine (PEPCID) 40 mg, Oral, Every Morning    ferrous gluconate (FERGON) 324 mg, Oral, Every Morning    finasteride (PROSCAR) 5 mg, Oral, Nightly    folic acid (FOLVITE) 1 mg, Oral, Nightly    Insulin Lispro, 1 Unit Dial, (HUMALOG) 100 UNIT/ML solution pen-injector inject EIGHT units under the skin INTO THE APPROPRIATE AREA THREE TIMES DAILY BEFORE meals PER sliding scale    ipratropium-albuterol (DUO-NEB) 0.5-2.5 mg/3 ml nebulizer 3 mL, Nebulization, Every 4 Hours PRN    Isopropyl Myristate solution 1 Application    Ketoconazole 1 % shampoo 10 mL, Apply externally, Every 3 Days    Levemir 10 Units, Subcutaneous, Nightly    levETIRAcetam (KEPPRA) 500 MG tablet     magnesium oxide (MAG-OX) 400 mg, Oral, Nightly    montelukast (SINGULAIR) 10 mg, Oral, Nightly    Multiple Vitamins-Iron (multivitamin with iron) tablet tablet 1 tablet, Oral, Every Morning    NIFEdipine XL (PROCARDIA XL) 30 mg, Oral, Every Morning    O2 (OXYGEN) 2 Liter O2 - CONTINUOUS (route: Oxygen)    Ozempic (0.25 or 0.5 MG/DOSE) 0.5 mg, Subcutaneous, Weekly, Dx: E11.65    tamsulosin (FLOMAX) 0.4 mg, Oral, Nightly    traZODone (DESYREL) 100 mg, Oral, Nightly    vitamin C (ASCORBIC ACID) 500 mg, Oral, 2 Times Daily    Vitamin D3 50 mcg, Oral, Every Morning        Vital Signs:   /75 (BP Location: Left arm, Patient Position:  "Sitting, Cuff Size: Large Adult)   Pulse 87   Ht 175.3 cm (69.02\")   SpO2 95%   BMI 35.57 kg/m²             Physical Exam:  Physical Exam  Vitals and nursing note reviewed.   Constitutional:       General: He is not in acute distress.     Appearance: Normal appearance. He is obese. He is not ill-appearing, toxic-appearing or diaphoretic.   HENT:      Head: Normocephalic and atraumatic.      Nose: No congestion or rhinorrhea.      Mouth/Throat:      Mouth: Mucous membranes are moist.      Pharynx: Oropharynx is clear. No oropharyngeal exudate or posterior oropharyngeal erythema.   Eyes:      Extraocular Movements: Extraocular movements intact.      Conjunctiva/sclera: Conjunctivae normal.      Pupils: Pupils are equal, round, and reactive to light.   Cardiovascular:      Rate and Rhythm: Normal rate and regular rhythm.      Pulses:           Dorsalis pedis pulses are 2+ on the right side and 2+ on the left side.      Heart sounds: Normal heart sounds. No murmur heard.  Pulmonary:      Effort: Pulmonary effort is normal.      Breath sounds: Normal breath sounds. No wheezing, rhonchi or rales.   Abdominal:      General: Abdomen is flat. Bowel sounds are normal.      Palpations: Abdomen is soft. There is no mass.      Tenderness: There is no abdominal tenderness.      Hernia: No hernia is present.   Musculoskeletal:      Cervical back: Neck supple. No rigidity.      Right lower leg: No edema.      Left lower leg: No edema.   Feet:      Right foot:      Protective Sensation: 7 sites tested.  0 sites sensed.      Skin integrity: Skin integrity normal. No ulcer or blister.      Left foot:      Protective Sensation: 7 sites tested.  0 sites sensed.      Skin integrity: Skin integrity normal. Ulcer present. No blister.      Comments: Diabetic Foot Exam Performed and Monofilament Test Performed     Lymphadenopathy:      Cervical: No cervical adenopathy.   Skin:     General: Skin is warm and dry.   Neurological:      " "General: No focal deficit present.      Mental Status: He is alert and oriented to person, place, and time. Mental status is at baseline.      Motor: Weakness present.      Gait: Gait abnormal.   Psychiatric:         Mood and Affect: Mood normal. Affect is flat.         Speech: Speech normal.         Behavior: Behavior normal.         Thought Content: Thought content normal.         Cognition and Memory: Cognition and memory normal.         Judgment: Judgment normal.           Review of Systems:  Review of Systems   Constitutional:  Positive for fatigue. Negative for chills and fever.   HENT:  Negative for ear discharge and sore throat.    Respiratory:  Positive for shortness of breath. Negative for cough and wheezing.    Cardiovascular:  Negative for chest pain, palpitations and leg swelling.   Gastrointestinal:  Negative for abdominal pain, constipation, diarrhea, nausea, vomiting and GERD.   Genitourinary:  Negative for flank pain.   Neurological:  Positive for weakness and numbness. Negative for dizziness and headache.   Psychiatric/Behavioral:  Positive for sleep disturbance and depressed mood. Negative for suicidal ideas.               Follow Up   Return in about 3 months (around 1/22/2025) for Recheck.    Part of this note may be an electronic transcription/translation of spoken language to printed   text using the Dragon Dictation System.            Medical History:  Medications Discontinued During This Encounter   Medication Reason    traZODone (DESYREL) 50 MG tablet Reorder    Semaglutide,0.25 or 0.5MG/DOS, (Ozempic, 0.25 or 0.5 MG/DOSE,) 2 MG/3ML solution pen-injector Reorder      Past Medical History:    Age-related cognitive decline    Allergic contact dermatitis    Allergies    Anemia    Bedbound    11/2023 \"MY LEG MUSCLES STOPPED WORKING\"    Bronchiectasis with acute lower respiratory infection    Charcot foot due to diabetes mellitus    Chronic diastolic (congestive) heart failure    Chronic kidney " disease    Chronic respiratory failure with hypoxia    Closed supracondylar fracture of femur    COPD (chronic obstructive pulmonary disease)    Deep vein thrombosis (DVT) of lower extremity associated with air travel    Dependence on supplemental oxygen    Eczema    Erectile dysfunction    due to organic reasons    Essential (primary) hypertension    Fracture    closed fracture of other tarsal and metatarsal bones    Fracture of proximal humerus    GERD without esophagitis    High risk medication use    Hypercholesteremia    Hypomagnesemia    Infected stasis ulcer of left lower extremity    Insomnia    Low back pain    Major depressive disorder    Morbid (severe) obesity due to excess calories    MRSA pneumonia    Muscle weakness    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 without evidence of necrosis    Obstructive sleep apnea (adult) (pediatric)    On home O2    REPORTS WEARING 2L/NC AAT    Other forms of dyspnea    Other long term (current) drug therapy    Other specified noninfective gastroenteritis and colitis    Other spondylosis, lumbar region    Pain in both knees    Paroxysmal atrial fibrillation    Peripheral neuropathy    attributed to type 2 diabetes    Pneumonia, unspecified organism    Polyneuropathy    Rash and other nonspecific skin eruption    Syncope and collapse    Tachycardia    Tinnitus    Type 1 diabetes mellitus with diabetic chronic kidney disease    Type 2 diabetes mellitus    Unspecified fall, initial encounter    Urinary retention     Past Surgical History:    CARDIAC CATHETERIZATION    Procedure: Carbon dioxide aortogram with left leg angiogram, possible angioplasty or stenting;  Surgeon: Moshe Willson MD;  [facility]: [facility];  Service: Vascular;  Laterality: Left;    CHOLECYSTECTOMY    CYSTOSCOPY    FEMUR SURGERY    Shravan placed    KNEE SURGERY    OTHER SURGICAL HISTORY    venous port, REMOVED    PORTACATH PLACEMENT     TIBIAL PLATEAU OPEN REDUCTION INTERNAL FIXATION    Procedure: TIBIAL PLATEAU OPEN REDUCTION INTERNAL FIXATION;  Surgeon: Hugo Kline MD;  Location: [facility] OR;  Service: Orthopedics;  Laterality: Left;    TONSILLECTOMY AND ADENOIDECTOMY      Family History   Problem Relation Age of Onset    Coronary artery disease Mother     Hypertension Mother     Diabetes type II Mother     Asthma Father     Diabetes type II Sister     Cancer Sister      Social History     Tobacco Use    Smoking status: Former     Current packs/day: 0.00     Average packs/day: 1 pack/day for 12.0 years (12.0 ttl pk-yrs)     Types: Cigarettes     Start date:      Quit date:      Years since quittin.8     Passive exposure: Past    Smokeless tobacco: Never   Substance Use Topics    Alcohol use: Not Currently       Health Maintenance Due   Topic Date Due    DIABETIC EYE EXAM  2024        Immunization History   Administered Date(s) Administered    COVID-19 (PFIZER) 12YRS+ (COMIRNATY) 10/22/2024    Flu Vaccine Quad PF >36MO 10/18/2016, 10/16/2017, 2019    Flu Vaccine Split Quad 2019    Fluzone  >6mos 2013    Fluzone (or Fluarix & Flulaval for VFC) >6mos 10/18/2016, 10/16/2017, 2019, 10/19/2022, 2023    Fluzone High-Dose 65+YRS 10/22/2024    Influenza Injectable Mdck Pf Quad 10/19/2022    Influenza, Unspecified 2020    PEDS-Pneumococcal Conjugate (PCV7) 2023    Pneumococcal Conjugate 20-Valent (PCV20) 2023    Pneumococcal Polysaccharide (PPSV23) 1997    Shingrix 2024, 10/22/2024    Tdap 2018    influenza Split 2019       Allergies   Allergen Reactions    Benadryl [Diphenhydramine] Itching    Proventil [Albuterol] Other (See Comments)     Mouth sores

## 2024-10-24 ENCOUNTER — OFFICE VISIT (OUTPATIENT)
Dept: WOUND CARE | Facility: HOSPITAL | Age: 59
End: 2024-10-24
Payer: COMMERCIAL

## 2024-10-24 VITALS — HEART RATE: 83 BPM | TEMPERATURE: 97 F | RESPIRATION RATE: 18 BRPM

## 2024-10-24 DIAGNOSIS — Z98.890 PERIPHERAL ARTERIAL DISEASE WITH HISTORY OF REVASCULARIZATION: ICD-10-CM

## 2024-10-24 DIAGNOSIS — E11.621 TYPE 2 DIABETES MELLITUS WITH FOOT ULCER, WITH LONG-TERM CURRENT USE OF INSULIN: ICD-10-CM

## 2024-10-24 DIAGNOSIS — Z79.4 TYPE 2 DIABETES MELLITUS WITH FOOT ULCER, WITH LONG-TERM CURRENT USE OF INSULIN: ICD-10-CM

## 2024-10-24 DIAGNOSIS — I50.32 CHRONIC DIASTOLIC HEART FAILURE: ICD-10-CM

## 2024-10-24 DIAGNOSIS — L97.509 TYPE 2 DIABETES MELLITUS WITH FOOT ULCER, WITH LONG-TERM CURRENT USE OF INSULIN: ICD-10-CM

## 2024-10-24 DIAGNOSIS — I73.9 PERIPHERAL ARTERIAL DISEASE WITH HISTORY OF REVASCULARIZATION: ICD-10-CM

## 2024-10-24 DIAGNOSIS — L89.893 PRESSURE INJURY OF LEFT FOOT, STAGE 3: ICD-10-CM

## 2024-10-24 DIAGNOSIS — L89.620 PRESSURE INJURY OF LEFT HEEL, UNSTAGEABLE: Primary | ICD-10-CM

## 2024-10-24 DIAGNOSIS — J96.11 CHRONIC RESPIRATORY FAILURE WITH HYPOXIA, ON HOME O2 THERAPY: ICD-10-CM

## 2024-10-24 DIAGNOSIS — Z99.81 CHRONIC RESPIRATORY FAILURE WITH HYPOXIA, ON HOME O2 THERAPY: ICD-10-CM

## 2024-10-24 PROCEDURE — G0463 HOSPITAL OUTPT CLINIC VISIT: HCPCS | Performed by: EMERGENCY MEDICINE

## 2024-10-24 PROCEDURE — 1160F RVW MEDS BY RX/DR IN RCRD: CPT | Performed by: EMERGENCY MEDICINE

## 2024-10-24 PROCEDURE — 1159F MED LIST DOCD IN RCRD: CPT | Performed by: EMERGENCY MEDICINE

## 2024-10-24 NOTE — PROGRESS NOTES
Chief Complaint  Wound Check (Wound check to left foot wounds, no ABX, HH 1x weekly, pt changing dressing daily.  ( ) )    Subjective      History of Present Illness    Preston Wallis  is a 59 y.o. male with multiple medical problems including COPD and chronic respiratory failure on oxygen, chronic anemia, chronic diastolic CHF, A-fib, chronic weakness, and type 2 diabetes.  He is being followed for left foot wounds that have been present since December 2023.    Patient had a LLE angiogram with Dr. Willson on 08/15/2024 with left posterior tibial artery select, percutaneous transluminal angioplasty of the left popliteal artery, and percutaneous transluminal angioplasty of the left posterior tibialis artery.    Patient completed a 6-week course of doxycycline and Augmentin for suspected osteomyelitis of the left calcaneus on plain radiographs.    History of Present Illness  The patient is a 59-year-old male who presents for evaluation of his wounds. He is accompanied by an adult female.    They report that his wounds are improving. He continues to apply Betadine gauze to his wounds nightly and tries to be careful to avoid putting pressure on them. He has not observed any drainage from the heel wound. He consistently uses a foam boot when he is in bed.    He occasionally experiences pain when moving his foot, describing it as feeling like there is a splinter in his foot at times, and a throbbing sensation at night.      We ordered him a new electric wheelchair.  He says he has a physical therapy session scheduled for tomorrow and they should have more updates for him on that. He sleeps on both his side and back and has been trying to stay on his side more..    Allergies:  Benadryl [diphenhydramine] and Proventil [albuterol]      Current Outpatient Medications:     amantadine (SYMMETREL) 100 MG tablet, , Disp: , Rfl:     apixaban (Eliquis) 5 MG tablet tablet, Take 1 tablet by mouth Every 12 (Twelve) Hours., Disp: 60  tablet, Rfl: 3    arformoterol (BROVANA) 15 MCG/2ML nebulizer solution, Take 2 mL by nebulization 2 (Two) Times a Day., Disp: 360 mL, Rfl: 3    aspirin 81 MG EC tablet, Take 1 tablet by mouth Every Morning., Disp: 30 tablet, Rfl: 3    atorvastatin (LIPITOR) 20 MG tablet, Take 1 tablet by mouth Every Night., Disp: 30 tablet, Rfl: 3    budesonide (Pulmicort) 0.5 MG/2ML nebulizer solution, Take 2 mL by nebulization 2 (Two) Times a Day., Disp: 360 mL, Rfl: 3    bumetanide (BUMEX) 2 MG tablet, Take 1 tablet by mouth 2 (Two) Times a Day., Disp: 60 tablet, Rfl: 3    busPIRone (BUSPAR) 15 MG tablet, Take 1 tablet by mouth 2 (Two) Times a Day., Disp: 60 tablet, Rfl: 3    calcium citrate (CALCITRATE) 950 (200 Ca) MG tablet, Take 1 tablet by mouth Every Night., Disp: 30 tablet, Rfl: 3    carvedilol (COREG) 25 MG tablet, Take 1 tablet by mouth Every 12 (Twelve) Hours., Disp: 60 tablet, Rfl: 3    Cholecalciferol (Vitamin D3) 50 MCG (2000 UT) tablet, Take 1 tablet by mouth Every Morning., Disp: 30 tablet, Rfl: 3    dapagliflozin (Farxiga) 5 MG tablet tablet, Take 1 tablet by mouth Every Morning., Disp: 30 tablet, Rfl: 3    docusate sodium (COLACE) 100 MG capsule, Take 1 capsule by mouth 2 (Two) Times a Day., Disp: 60 capsule, Rfl: 3    DULoxetine (Cymbalta) 60 MG capsule, Take 1 capsule by mouth Daily., Disp: 90 capsule, Rfl: 1    famotidine (PEPCID) 40 MG tablet, Take 1 tablet by mouth Every Morning., Disp: 30 tablet, Rfl: 3    ferrous gluconate (FERGON) 324 MG tablet, Take 1 tablet by mouth Every Morning., Disp: 30 tablet, Rfl: 3    finasteride (PROSCAR) 5 MG tablet, Take 1 tablet by mouth Every Night., Disp: 30 tablet, Rfl: 3    folic acid (FOLVITE) 1 MG tablet, Take 1 tablet by mouth Every Night., Disp: 30 tablet, Rfl: 3    insulin detemir (Levemir) 100 UNIT/ML injection, Inject 10 Units under the skin into the appropriate area as directed Every Night., Disp: 15 mL, Rfl: 2    Insulin Lispro, 1 Unit Dial, (HUMALOG) 100 UNIT/ML  "solution pen-injector, inject EIGHT units under the skin INTO THE APPROPRIATE AREA THREE TIMES DAILY BEFORE meals PER sliding scale, Disp: 15 mL, Rfl: 0    ipratropium-albuterol (DUO-NEB) 0.5-2.5 mg/3 ml nebulizer, Take 3 mL by nebulization Every 4 (Four) Hours As Needed for Wheezing or Shortness of Air., Disp: 360 mL, Rfl: 5    Isopropyl Myristate solution, Apply 1 Application topically to the appropriate area as directed., Disp: , Rfl:     Ketoconazole 1 % shampoo, Apply 10 mL topically Every 3 (Three) Days., Disp: 200 mL, Rfl: 0    levETIRAcetam (KEPPRA) 500 MG tablet, , Disp: , Rfl:     magnesium oxide (MAG-OX) 400 tablet tablet, Take 1 tablet by mouth Every Night., Disp: 30 tablet, Rfl: 3    montelukast (SINGULAIR) 10 MG tablet, Take 1 tablet by mouth Every Night., Disp: 30 tablet, Rfl: 5    Multiple Vitamins-Iron (multivitamin with iron) tablet tablet, Take 1 tablet by mouth Every Morning., Disp: 30 tablet, Rfl: 3    NIFEdipine XL (PROCARDIA XL) 30 MG 24 hr tablet, Take 1 tablet by mouth Every Morning., Disp: 30 tablet, Rfl: 3    O2 (OXYGEN), 2 Liter O2 - CONTINUOUS (route: Oxygen), Disp: , Rfl:     Semaglutide,0.25 or 0.5MG/DOS, (Ozempic, 0.25 or 0.5 MG/DOSE,) 2 MG/3ML solution pen-injector, Inject 0.5 mg under the skin into the appropriate area as directed 1 (One) Time Per Week. Dx: E11.65  Indications: Type 2 Diabetes, Disp: 3 mL, Rfl: 2    tamsulosin (FLOMAX) 0.4 MG capsule 24 hr capsule, Take 1 capsule by mouth Every Night., Disp: 30 capsule, Rfl: 3    traZODone (DESYREL) 50 MG tablet, Take 2 tablets by mouth Every Night., Disp: 60 tablet, Rfl: 3    vitamin C (ASCORBIC ACID) 500 MG tablet, Take 1 tablet by mouth 2 (Two) Times a Day., Disp: 60 tablet, Rfl: 3    Past Medical History:   Diagnosis Date    Age-related cognitive decline     Allergic contact dermatitis     Allergies     Anemia     Bedbound     11/2023 \"MY LEG MUSCLES STOPPED WORKING\"    Bronchiectasis with acute lower respiratory infection     " Charcot foot due to diabetes mellitus 09/10/2013    Chronic diastolic (congestive) heart failure     Chronic kidney disease     Chronic respiratory failure with hypoxia     Closed supracondylar fracture of femur 01/12/2022    COPD (chronic obstructive pulmonary disease)     Deep vein thrombosis (DVT) of lower extremity associated with air travel 01/13/2023    Dependence on supplemental oxygen     Eczema     Erectile dysfunction     due to organic reasons    Essential (primary) hypertension     Fracture     closed fracture of other tarsal and metatarsal bones    Fracture of proximal humerus 01/13/2023    GERD without esophagitis     High risk medication use     Hypercholesteremia     Hypomagnesemia     Infected stasis ulcer of left lower extremity 01/13/2023    Insomnia     Low back pain     Major depressive disorder     Morbid (severe) obesity due to excess calories     MRSA pneumonia     Muscle weakness     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 without evidence of necrosis     Obstructive sleep apnea (adult) (pediatric)     On home O2     REPORTS WEARING 2L/NC AAT    Other forms of dyspnea     Other long term (current) drug therapy     Other specified noninfective gastroenteritis and colitis     Other spondylosis, lumbar region     Pain in both knees     Paroxysmal atrial fibrillation     Peripheral neuropathy     attributed to type 2 diabetes    Pneumonia, unspecified organism     Polyneuropathy     Rash and other nonspecific skin eruption     Syncope and collapse     Tachycardia     Tinnitus 01/13/2023    Type 1 diabetes mellitus with diabetic chronic kidney disease     Type 2 diabetes mellitus     Unspecified fall, initial encounter     Urinary retention      Past Surgical History:   Procedure Laterality Date    CARDIAC CATHETERIZATION Left 8/15/2024    Procedure: Carbon dioxide aortogram with left leg angiogram, possible angioplasty or stenting;  Surgeon: Anamika  MD Moshe;  Location: [facility] INVASIVE LOCATION;  Service: Vascular;  Laterality: Left;    CHOLECYSTECTOMY      CYSTOSCOPY      FEMUR SURGERY Left     Shravan placed    KNEE SURGERY Left     OTHER SURGICAL HISTORY Left     venous port, REMOVED    PORTACATH PLACEMENT Right     TIBIAL PLATEAU OPEN REDUCTION INTERNAL FIXATION Left 2023    Procedure: TIBIAL PLATEAU OPEN REDUCTION INTERNAL FIXATION;  Surgeon: Hugo Kline MD;  Location: [facility] OR;  Service: Orthopedics;  Laterality: Left;    TONSILLECTOMY AND ADENOIDECTOMY       Social History     Socioeconomic History    Marital status:    Tobacco Use    Smoking status: Former     Current packs/day: 0.00     Average packs/day: 1 pack/day for 12.0 years (12.0 ttl pk-yrs)     Types: Cigarettes     Start date:      Quit date:      Years since quittin.8     Passive exposure: Past    Smokeless tobacco: Never   Vaping Use    Vaping status: Never Used   Substance and Sexual Activity    Alcohol use: Not Currently    Drug use: Never    Sexual activity: Defer           Objective     Vitals:    10/24/24 1325   Pulse: 83   Resp: 18  Comment: 98% 2L   Temp: 97 °F (36.1 °C)   TempSrc: Temporal   PainSc: 0-No pain     There is no height or weight on file to calculate BMI.    STEADI Fall Risk Assessment was completed, and patient is at LOW risk for falls.Assessment completed on:10/22/2024     Review of Systems     ROS:  Per HPI.     I have reviewed the HPI and ROS as documented by MA/RN. Katerin Torres MD  Physical Exam    NAD  AAOx3, pleasant, cooperative  Left posterior heel wound with decreased size and some softening of the central eschar.  New epithelialization along the edges.  Eschar with narrow split through the center with some bleeding noted with manipulation.  Left plantar wound with significant improvement, especially after removing the overlying callus, which peeled away easily.  Wound is now split into 2 very small wounds  with minimal slough in the base.    See photos for details.                  Result Review :  The following data was reviewed by: Katerin Torres MD on 10/24/2024:    Prior notes and images.    Results               Assessment and Plan   Diagnoses and all orders for this visit:    1. Pressure injury of left heel, unstageable (Primary)    2. Pressure injury of left foot, stage 3    3. Type 2 diabetes mellitus with foot ulcer, with long-term current use of insulin    4. Chronic respiratory failure with hypoxia, on home O2 therapy    5. Peripheral arterial disease with history of revascularization    6. Chronic diastolic heart failure        Assessment & Plan    The wounds of the left foot are improving. He was advised to continue using Betadine gauze for wound care every night. He was also instructed to avoid putting pressure on the wounds and to use an offloading shoe for support. Sleeping on his side was recommended to prevent further pressure on the wound. He was encouraged to bear weight on the front of his foot and to use a wheelchair for mobility. If the wound starts to worsen, he should revert to the previous care routine.    Follow-up  Return in 1 month for follow up.    Patient was given instructions and counseling regarding their condition or for health maintenance advice, as well as the wound care plan and recommendations. They understand and agree with the plan.  They will follow back up here in the clinic but are instructed to contact us in the interim should they have any new, returning, or worsening symptoms or concerns. Please see specific information pulled into the AVS if appropriate.     Dragon Dictation utilized for chart completion.    I spent 35 minutes in both face-to-face and nonface-to-face time for patient care today including, but not limited to, review of old records, reviewing old images, history and physical exam, reviewing test results, counseling patient and family, entering orders,  coordinating care, and documenting.      Follow Up   Return in about 4 weeks (around 11/21/2024).      Katerin Torres MD    Patient or patient representative verbalized consent for the use of Ambient Listening during the visit with  Katerin Torres MD for chart documentation. 10/24/2024  14:11 EDT

## 2024-10-25 ENCOUNTER — PATIENT OUTREACH (OUTPATIENT)
Dept: CASE MANAGEMENT | Facility: OTHER | Age: 59
End: 2024-10-25
Payer: COMMERCIAL

## 2024-10-25 DIAGNOSIS — R60.0 LOWER EXTREMITY EDEMA: Primary | Chronic | ICD-10-CM

## 2024-10-25 NOTE — OUTREACH NOTE
AMBULATORY CASE MANAGEMENT NOTE    Names and Relationships of Patient/Support Persons: Contact: Kami Wallis; Relationship: Emergency Contact -     Request a medication list. Printed and left at .  Wound care appt kept.     Education Documentation  No documentation found.        Tara GOMEZ  Ambulatory Case Management    10/25/2024, 14:44 EDT

## 2024-10-30 ENCOUNTER — TELEPHONE (OUTPATIENT)
Dept: PULMONOLOGY | Facility: CLINIC | Age: 59
End: 2024-10-30

## 2024-10-30 ENCOUNTER — OFFICE VISIT (OUTPATIENT)
Dept: PULMONOLOGY | Facility: CLINIC | Age: 59
End: 2024-10-30
Payer: COMMERCIAL

## 2024-10-30 VITALS
WEIGHT: 245 LBS | HEART RATE: 78 BPM | SYSTOLIC BLOOD PRESSURE: 147 MMHG | HEIGHT: 69 IN | TEMPERATURE: 97.6 F | BODY MASS INDEX: 36.29 KG/M2 | DIASTOLIC BLOOD PRESSURE: 55 MMHG | RESPIRATION RATE: 14 BRPM | OXYGEN SATURATION: 96 %

## 2024-10-30 DIAGNOSIS — F17.201 TOBACCO ABUSE, IN REMISSION: ICD-10-CM

## 2024-10-30 DIAGNOSIS — R06.09 CHRONIC DYSPNEA: ICD-10-CM

## 2024-10-30 DIAGNOSIS — J96.01 ACUTE HYPOXIC ON CHRONIC HYPERCAPNIC RESPIRATORY FAILURE: ICD-10-CM

## 2024-10-30 DIAGNOSIS — J44.9 CHRONIC OBSTRUCTIVE PULMONARY DISEASE, UNSPECIFIED COPD TYPE: ICD-10-CM

## 2024-10-30 DIAGNOSIS — G47.33 OBSTRUCTIVE SLEEP APNEA: ICD-10-CM

## 2024-10-30 DIAGNOSIS — J96.12 CHRONIC RESPIRATORY FAILURE WITH HYPOXIA AND HYPERCAPNIA: ICD-10-CM

## 2024-10-30 DIAGNOSIS — T78.40XA ALLERGY, INITIAL ENCOUNTER: Primary | ICD-10-CM

## 2024-10-30 DIAGNOSIS — J96.12 ACUTE HYPOXIC ON CHRONIC HYPERCAPNIC RESPIRATORY FAILURE: ICD-10-CM

## 2024-10-30 DIAGNOSIS — J47.1 BRONCHIECTASIS WITH ACUTE EXACERBATION: ICD-10-CM

## 2024-10-30 DIAGNOSIS — J96.11 CHRONIC RESPIRATORY FAILURE WITH HYPOXIA AND HYPERCAPNIA: ICD-10-CM

## 2024-10-30 NOTE — TELEPHONE ENCOUNTER
I called Endo and had pt bronch rescheduled to 11/12/2024 arrival time 8:00 due to pt being insulin dependant and being NPO. I spoke with Man in Endo to get pt rescheduled.

## 2024-10-30 NOTE — PROGRESS NOTES
Primary Care Provider  Kimmy Riley MD     Referring Provider  No ref. provider found     Chief Complaint  Sleep Apnea, COPD, Follow-up, Pneumonia, Cough, Shortness of Breath, and Wheezing    Subjective          Preston Wallis presents to [facility]  History of Present Illness  Preston Wallis is a 59 y.o. male patient   History of Present Illness  The patient is a 59-year-old male with a history of severe COPD, bronchiectasis, and pseudomonas pneumonia, presenting for a follow-up visit. He is accompanied by an adult female.    He reports no current issues with his breathing and no cough. His treatment regimen includes tobramycin once a month, Brovana and Pulmicort twice daily, and albuterol as needed. He was previously on Spiriva, but it was discontinued due to its similarity to the nebulizers. He has not undergone a bronchoscopy.    He uses a CPAP machine at night but experiences panic attacks when using it. Additionally, he does not rinse his mouth after using the inhaler.    He received the influenza vaccine from Dr. Hanson. However, he has not received the pneumonia vaccine due to a previous hospitalization following its administration.    He had a recent CT scan of the chest.    Review of Systems     General:  No Fatigue, No Fever No weight loss or loss of appetite  HEENT: No dysphagia, No Visual Changes, no rhinorrhea  Respiratory:  + cough,+Dyspnea, intermittent mucoid phlegm, No Pleuritic Pain, no wheezing, no hemoptysis.  Cardiovascular: Denies chest pain, denies palpitations,+FREITAS, No Chest Pressure  Gastrointestinal:  No Abdominal Pain, No Nausea, No Vomiting, No Diarrhea  Genitourinary:  No Dysuria, No Frequency, No Hesitancy  Musculoskeletal: No muscle pain or swelling  Endocrine:  No Heat Intolerance, No Cold Intolerance  Hematologic:  No Bleeding, No Bruising  Psychiatric:  No Anxiety, No Depression  Neurologic:  No Confusion, no Dysarthria, No  "Headaches  Skin:  No Rash, No Open Wounds    Family History   Problem Relation Age of Onset    Coronary artery disease Mother     Hypertension Mother     Diabetes type II Mother     Asthma Father     Diabetes type II Sister     Cancer Sister         Social History     Socioeconomic History    Marital status:    Tobacco Use    Smoking status: Former     Current packs/day: 0.00     Average packs/day: 1 pack/day for 12.0 years (12.0 ttl pk-yrs)     Types: Cigarettes     Start date:      Quit date:      Years since quittin.8     Passive exposure: Past    Smokeless tobacco: Never   Vaping Use    Vaping status: Never Used   Substance and Sexual Activity    Alcohol use: Not Currently    Drug use: Never    Sexual activity: Defer        Past Medical History:   Diagnosis Date    Age-related cognitive decline     Allergic contact dermatitis     Allergies     Anemia     Bedbound     2023 \"MY LEG MUSCLES STOPPED WORKING\"    Bronchiectasis with acute lower respiratory infection     Charcot foot due to diabetes mellitus 09/10/2013    Chronic diastolic (congestive) heart failure     Chronic kidney disease     Chronic respiratory failure with hypoxia     Closed supracondylar fracture of femur 2022    COPD (chronic obstructive pulmonary disease)     Deep vein thrombosis (DVT) of lower extremity associated with air travel 2023    Dependence on supplemental oxygen     Eczema     Erectile dysfunction     due to organic reasons    Essential (primary) hypertension     Fracture     closed fracture of other tarsal and metatarsal bones    Fracture of proximal humerus 2023    GERD without esophagitis     High risk medication use     Hypercholesteremia     Hypomagnesemia     Infected stasis ulcer of left lower extremity 2023    Insomnia     Low back pain     Major depressive disorder     Morbid (severe) obesity due to excess calories     MRSA pneumonia     Muscle weakness     Non-pressure chronic " ulcer of other part of unspecified foot with bone involvement without evidence of necrosis     Obstructive sleep apnea (adult) (pediatric)     On home O2     REPORTS WEARING 2L/NC AAT    Other forms of dyspnea     Other long term (current) drug therapy     Other specified noninfective gastroenteritis and colitis     Other spondylosis, lumbar region     Pain in both knees     Paroxysmal atrial fibrillation     Peripheral neuropathy     attributed to type 2 diabetes    Pneumonia, unspecified organism     Polyneuropathy     Rash and other nonspecific skin eruption     Syncope and collapse     Tachycardia     Tinnitus 01/13/2023    Type 1 diabetes mellitus with diabetic chronic kidney disease     Type 2 diabetes mellitus     Unspecified fall, initial encounter     Urinary retention         Immunization History   Administered Date(s) Administered    COVID-19 (PFIZER) 12YRS+ (COMIRNATY) 10/22/2024    Flu Vaccine Quad PF >36MO 10/18/2016, 10/16/2017, 11/04/2019    Flu Vaccine Split Quad 11/04/2019    Fluzone  >6mos 09/19/2013    Fluzone (or Fluarix & Flulaval for VFC) >6mos 10/18/2016, 10/16/2017, 11/04/2019, 10/19/2022, 11/14/2023    Fluzone High-Dose 65+YRS 10/22/2024    Influenza Injectable Mdck Pf Quad 10/19/2022    Influenza, Unspecified 09/21/2020    PEDS-Pneumococcal Conjugate (PCV7) 11/14/2023    Pneumococcal Conjugate 20-Valent (PCV20) 11/14/2023    Pneumococcal Polysaccharide (PPSV23) 11/20/1997    Shingrix 07/11/2024, 10/22/2024    Tdap 09/18/2018    influenza Split 11/04/2019         Allergies   Allergen Reactions    Benadryl [Diphenhydramine] Itching    Proventil [Albuterol] Other (See Comments)     Mouth sores            Current Outpatient Medications:     apixaban (Eliquis) 5 MG tablet tablet, Take 1 tablet by mouth Every 12 (Twelve) Hours., Disp: 60 tablet, Rfl: 3    arformoterol (BROVANA) 15 MCG/2ML nebulizer solution, Take 2 mL by nebulization 2 (Two) Times a Day., Disp: 360 mL, Rfl: 3    aspirin 81 MG EC  tablet, Take 1 tablet by mouth Every Morning., Disp: 30 tablet, Rfl: 3    atorvastatin (LIPITOR) 20 MG tablet, Take 1 tablet by mouth Every Night., Disp: 30 tablet, Rfl: 3    budesonide (Pulmicort) 0.5 MG/2ML nebulizer solution, Take 2 mL by nebulization 2 (Two) Times a Day., Disp: 360 mL, Rfl: 3    bumetanide (BUMEX) 2 MG tablet, Take 1 tablet by mouth 2 (Two) Times a Day., Disp: 60 tablet, Rfl: 3    busPIRone (BUSPAR) 15 MG tablet, Take 1 tablet by mouth 2 (Two) Times a Day., Disp: 60 tablet, Rfl: 3    calcium citrate (CALCITRATE) 950 (200 Ca) MG tablet, Take 1 tablet by mouth Every Night., Disp: 30 tablet, Rfl: 3    carvedilol (COREG) 25 MG tablet, Take 1 tablet by mouth Every 12 (Twelve) Hours., Disp: 60 tablet, Rfl: 3    Cholecalciferol (Vitamin D3) 50 MCG (2000 UT) tablet, Take 1 tablet by mouth Every Morning., Disp: 30 tablet, Rfl: 3    dapagliflozin (Farxiga) 5 MG tablet tablet, Take 1 tablet by mouth Every Morning., Disp: 30 tablet, Rfl: 3    docusate sodium (COLACE) 100 MG capsule, Take 1 capsule by mouth 2 (Two) Times a Day., Disp: 60 capsule, Rfl: 3    DULoxetine (Cymbalta) 60 MG capsule, Take 1 capsule by mouth Daily., Disp: 90 capsule, Rfl: 1    famotidine (PEPCID) 40 MG tablet, Take 1 tablet by mouth Every Morning., Disp: 30 tablet, Rfl: 3    ferrous gluconate (FERGON) 324 MG tablet, Take 1 tablet by mouth Every Morning., Disp: 30 tablet, Rfl: 3    finasteride (PROSCAR) 5 MG tablet, Take 1 tablet by mouth Every Night., Disp: 30 tablet, Rfl: 3    folic acid (FOLVITE) 1 MG tablet, Take 1 tablet by mouth Every Night., Disp: 30 tablet, Rfl: 3    insulin detemir (Levemir) 100 UNIT/ML injection, Inject 10 Units under the skin into the appropriate area as directed Every Night., Disp: 15 mL, Rfl: 2    Insulin Lispro, 1 Unit Dial, (HUMALOG) 100 UNIT/ML solution pen-injector, inject EIGHT units under the skin INTO THE APPROPRIATE AREA THREE TIMES DAILY BEFORE meals PER sliding scale, Disp: 15 mL, Rfl: 0     "ipratropium-albuterol (DUO-NEB) 0.5-2.5 mg/3 ml nebulizer, Take 3 mL by nebulization Every 4 (Four) Hours As Needed for Wheezing or Shortness of Air., Disp: 360 mL, Rfl: 5    Isopropyl Myristate solution, Apply 1 Application topically to the appropriate area as directed., Disp: , Rfl:     Ketoconazole 1 % shampoo, Apply 10 mL topically Every 3 (Three) Days., Disp: 200 mL, Rfl: 0    magnesium oxide (MAG-OX) 400 tablet tablet, Take 1 tablet by mouth Every Night., Disp: 30 tablet, Rfl: 3    montelukast (SINGULAIR) 10 MG tablet, Take 1 tablet by mouth Every Night., Disp: 30 tablet, Rfl: 5    Multiple Vitamins-Iron (multivitamin with iron) tablet tablet, Take 1 tablet by mouth Every Morning., Disp: 30 tablet, Rfl: 3    NIFEdipine XL (PROCARDIA XL) 30 MG 24 hr tablet, Take 1 tablet by mouth Every Morning., Disp: 30 tablet, Rfl: 3    O2 (OXYGEN), 2 Liter O2 - CONTINUOUS (route: Oxygen), Disp: , Rfl:     Semaglutide,0.25 or 0.5MG/DOS, (Ozempic, 0.25 or 0.5 MG/DOSE,) 2 MG/3ML solution pen-injector, Inject 0.5 mg under the skin into the appropriate area as directed 1 (One) Time Per Week. Dx: E11.65  Indications: Type 2 Diabetes, Disp: 3 mL, Rfl: 2    tamsulosin (FLOMAX) 0.4 MG capsule 24 hr capsule, Take 1 capsule by mouth Every Night., Disp: 30 capsule, Rfl: 3    traZODone (DESYREL) 50 MG tablet, Take 2 tablets by mouth Every Night., Disp: 60 tablet, Rfl: 3    vitamin C (ASCORBIC ACID) 500 MG tablet, Take 1 tablet by mouth 2 (Two) Times a Day., Disp: 60 tablet, Rfl: 3    tiotropium bromide monohydrate (SPIRIVA RESPIMAT) 2.5 MCG/ACT aerosol solution inhaler, Inhale 2 puffs Daily., Disp: 1 each, Rfl: 11     Objective   Physical Exam  Physical Exam      Vital Signs:   /55 (BP Location: Left arm, Patient Position: Sitting, Cuff Size: Large Adult)   Pulse 78   Temp 97.6 °F (36.4 °C) (Temporal)   Resp 14   Ht 175.3 cm (69.02\")   Wt 111 kg (245 lb)   SpO2 96% Comment: 2L continous  BMI 36.16 kg/m²       Vital Signs " Reviewed  WDWN, Alert, in wheelchair, in mild distress, on nasal oxygen, has conversational dyspnea  HEENT:  PERRL, EOMI.  OP, nares clear, no sinus tenderness  Mallampatti classification : 1  Neck:  Supple, no JVD, no thyromegaly  Lymph: no axillary, cervical, supraclavicular lymphadenopathy noted bilaterally  Chest:  good aeration, bilateral diminished breath sounds, no wheezing, crackles, has scattered b/l rhonchi, resonant to percussion b/l  CV: RRR, no MGR, pulses 2+, equal.  Abd:  Soft, NT, ND, + BS, no HSM  EXT:  no clubbing, no cyanosis, No BLE edema  Neuro:  A&Ox3, CN grossly intact, no focal deficits  Skin: No rashes or lesions noted     Result Review :   The following data was reviewed by: Martín Rivera MD on 10/30/2024:  Common labs          7/18/2024    13:55 8/15/2024    13:24 10/22/2024    12:49   Common Labs   Glucose 290  221     BUN 37  49     Creatinine 2.06  2.46     Sodium 138  138     Potassium 4.5  4.9     Chloride 93  96     Calcium 9.5  9.7     Albumin 3.7      Total Bilirubin <0.2      Alkaline Phosphatase 196      AST (SGOT) 27      ALT (SGPT) 25      WBC 11.82  13.08     Hemoglobin 8.7  8.6     Hematocrit 28.7  27.7     Platelets 317  299     Total Cholesterol 109      Triglycerides 117      HDL Cholesterol 36      LDL Cholesterol  52      Hemoglobin A1C 8.30   7.5    PSA 0.206        CMP          5/3/2024    04:37 7/18/2024    13:55 8/15/2024    13:24   CMP   Glucose 123  290  221    BUN 29  37  49    Creatinine 2.21  2.06  2.46    EGFR 33.7  36.4  29.4    Sodium 140  138  138    Potassium 3.5  4.5  4.9    Chloride 95  93  96    Calcium 9.0  9.5  9.7    Total Protein 6.5  8.3     Albumin 2.7  3.7     Globulin  4.6     Total Bilirubin 0.2  <0.2     Alkaline Phosphatase 113  196     AST (SGOT) 22  27     ALT (SGPT) 19  25     Albumin/Globulin Ratio  0.8     BUN/Creatinine Ratio 13.1  18.0  19.9    Anion Gap 8.3  12.2  10.4      CBC          5/3/2024    04:37 7/18/2024    13:55 8/15/2024     13:24   CBC   WBC 11.17  11.82  13.08    RBC 2.97  3.15  3.10    Hemoglobin 8.2  8.7  8.6    Hematocrit 27.1  28.7  27.7    MCV 91.2  91.1  89.4    MCH 27.6  27.6  27.7    MCHC 30.3  30.3  31.0    RDW 14.6  13.9  14.5    Platelets 296  317  299      CBC w/diff          5/3/2024    04:37 7/18/2024    13:55 8/15/2024    13:24   CBC w/Diff   WBC 11.17  11.82  13.08    RBC 2.97  3.15  3.10    Hemoglobin 8.2  8.7  8.6    Hematocrit 27.1  28.7  27.7    MCV 91.2  91.1  89.4    MCH 27.6  27.6  27.7    MCHC 30.3  30.3  31.0    RDW 14.6  13.9  14.5    Platelets 296  317  299    Neutrophil Rel % 71.5  69.6  70.0    Immature Granulocyte Rel % 0.4  0.3  0.3    Lymphocyte Rel % 13.9  17.1  17.3    Monocyte Rel % 9.2  8.5  7.3    Eosinophil Rel % 4.4  4.1  4.5    Basophil Rel % 0.6  0.4  0.6      Data reviewed : Radiologic studies recent CT chest from 10/22/24 reviewed.     Results  Imaging  CT scan of the chest shows severe cavitary pneumonia on both sides, more on the right upper part.    Narrative & Impression   CT CHEST WO CONTRAST DIAGNOSTIC     Date of Exam: 10/22/2024 10:48 AM EDT     Indication: pneumonia, abnormal chest ct scan.     Comparison: Chest CT 7/22/2024     Technique: Axial CT images were obtained of the chest without contrast administration.  Reconstructed coronal and sagittal images were also obtained. Automated exposure control and iterative construction methods were used.        Findings:  Thyroid unremarkable. Right chest port catheter tip within upper SVC. Abandoned subclavian catheter in stable position. Aortic atherosclerotic disease without aneurysm. Normal caliber main pulmonary artery. Calcified coronary atherosclerotic disease.   Mild stable cardiomegaly. Hypodense cardiac blood pooling, nonspecific and can be seen with anemia. Small sliding hiatal hernia.     Stable mild mediastinal adenopathy, presumed reactive. No enlarging thoracic or axillary adenopathy. Cholecystectomy. No acute findings in  the partially imaged upper abdomen. Gynecomastia. No acute chest wall finding. Chronic left rib deformity. No acute   displaced fracture or aggressive lesion. Degenerative related changes throughout the spine with findings suggesting possible diffuse idiopathic skeletal hyperostosis. No visualized displaced fracture or aggressive bone lesion.     Trachea patent. Multifocal upper lobe predominant bronchiectatic change similar to prior exam favor combination of varicose and cystic type bronchiectasis. Bandlike atelectasis versus scar in the inferior aspect of right upper lobe stable from prior.   Mosaic attenuation noted suggesting component of air trapping. Mild tree-in-bud nodularity in the right upper, right lower and left lower lobes slightly increasing in the right upper lobe example image 10 series 201 otherwise stable.. No discrete lobar   consolidation. Trace right pleural effusion less conspicuous from prior. Mildly thickened adjacent pleura similar to prior no pneumothorax or overtly suspicious pulmonary nodule.     IMPRESSION:  Impression:  1. Mild multifocal tree-in-bud nodular opacities slightly increasing in the right upper lobe otherwise stable. Stable multifocal upper lobe predominant bronchiectasis. Findings likely reflect waxing and waning chronic atypical infection which may include   nontuberculous mycobacteria, aspiration, among other etiologies.  2. Less conspicuous trace right pleural fluid with adjacent pleural thickening.  3. Stable mild mediastinal adenopathy, presumed reactive.  4. Other ancillary findings similar to previous exam detailed above.           Electronically Signed: Lino Stevenson MD    10/23/2024 4:37 PM EDT    Workstation ID: [id number]              Assessment and Plan    Diagnoses and all orders for this visit:    1. Allergy, initial encounter (Primary)  -     Case Request; Standing  -     Follow Anesthesia Guidlines / Standing Orders; Standing  -     Case Request  -      tiotropium bromide monohydrate (SPIRIVA RESPIMAT) 2.5 MCG/ACT aerosol solution inhaler; Inhale 2 puffs Daily.  Dispense: 1 each; Refill: 11    2. Acute hypoxic on chronic hypercapnic respiratory failure  -     Case Request; Standing  -     Follow Anesthesia Guidlines / Standing Orders; Standing  -     Case Request  -     tiotropium bromide monohydrate (SPIRIVA RESPIMAT) 2.5 MCG/ACT aerosol solution inhaler; Inhale 2 puffs Daily.  Dispense: 1 each; Refill: 11    3. Tobacco abuse, in remission  -     Case Request; Standing  -     Follow Anesthesia Guidlines / Standing Orders; Standing  -     Case Request  -     tiotropium bromide monohydrate (SPIRIVA RESPIMAT) 2.5 MCG/ACT aerosol solution inhaler; Inhale 2 puffs Daily.  Dispense: 1 each; Refill: 11    4. Chronic dyspnea  -     Case Request; Standing  -     Follow Anesthesia Guidlines / Standing Orders; Standing  -     Case Request  -     tiotropium bromide monohydrate (SPIRIVA RESPIMAT) 2.5 MCG/ACT aerosol solution inhaler; Inhale 2 puffs Daily.  Dispense: 1 each; Refill: 11    5. Obstructive sleep apnea  -     Case Request; Standing  -     Follow Anesthesia Guidlines / Standing Orders; Standing  -     Case Request  -     tiotropium bromide monohydrate (SPIRIVA RESPIMAT) 2.5 MCG/ACT aerosol solution inhaler; Inhale 2 puffs Daily.  Dispense: 1 each; Refill: 11    6. Chronic respiratory failure with hypoxia and hypercapnia  -     Case Request; Standing  -     Follow Anesthesia Guidlines / Standing Orders; Standing  -     Case Request  -     tiotropium bromide monohydrate (SPIRIVA RESPIMAT) 2.5 MCG/ACT aerosol solution inhaler; Inhale 2 puffs Daily.  Dispense: 1 each; Refill: 11    7. Chronic obstructive pulmonary disease, unspecified COPD type  -     Case Request; Standing  -     Follow Anesthesia Guidlines / Standing Orders; Standing  -     Case Request  -     tiotropium bromide monohydrate (SPIRIVA RESPIMAT) 2.5 MCG/ACT aerosol solution inhaler; Inhale 2 puffs Daily.   Dispense: 1 each; Refill: 11    8. Bronchiectasis with acute exacerbation  -     tiotropium bromide monohydrate (SPIRIVA RESPIMAT) 2.5 MCG/ACT aerosol solution inhaler; Inhale 2 puffs Daily.  Dispense: 1 each; Refill: 11      Assessment & Plan  1. Severe cavitary pneumonia.  The CT scan from October 22, 2024 reveals severe cavitary pneumonia in both lungs, more pronounced in the right upper lung. This suggests a persistent infection, potentially involving bacteria not previously treated. A bronchoscopy is recommended to obtain samples for further analysis. The procedure will be scheduled for next Tuesday. The potential risks and benefits, including complications such as bleeding and pneumothorax, have been discussed. He is advised to rinse his mouth after each use of the inhaler.    Discussed bronchoscopy with bronchoalveolar lavage, bronchial washing, possible transbronchial biopsy, endobronchial brushing, airway inspection.  Risks and benefits of the procedure were discussed in detail.  Alternatives and options were reviewed.  Risks including pneumothorax, pneumonia, bleeding, respiratory depression and that it could be fatal were all reviewed.  Patient understands and is agreeable for the procedure.    2. Chronic Obstructive Pulmonary Disease (COPD).  He is currently using Brovana and Pulmicort nebulizers twice a day, and albuterol as needed. He is not using the DuoNeb regularly. A prescription for Spiriva has been provided to be used daily. He is advised to continue using the CPAP machine at night despite experiencing panic attacks. He is unwilling with panic attacks, and the machine has been returned.     3. Health Maintenance.  He has already received the flu shot. He has not received the pneumonia shot due to a previous adverse reaction.      Follow Up   Return in about 4 weeks (around 11/27/2024).  Patient was given instructions and counseling regarding his condition or for health maintenance advice. Please  see specific information pulled into the AVS if appropriate.     Patient or patient representative verbalized consent for the use of Ambient Listening during the visit with  Martín Rivera MD for chart documentation. 10/30/2024  10:35 EDT    Electronically signed by Martín Rivera MD, 10/30/2024, 10:38 EDT.

## 2024-10-30 NOTE — TELEPHONE ENCOUNTER
I called and spoke with [facility] in Endo and pt has been scheduled for bronch 11/05/2024 arrival time 12:00. Pt has been advised to hold Eliquis and Farxiga 5 days prior and Aspirin 2 days prior. Pt has been given instruction sheet at check out.

## 2024-10-31 ENCOUNTER — PATIENT OUTREACH (OUTPATIENT)
Dept: CASE MANAGEMENT | Facility: OTHER | Age: 59
End: 2024-10-31
Payer: COMMERCIAL

## 2024-10-31 DIAGNOSIS — Z71.89 MEDICATION CARE PLAN DISCUSSED WITH PATIENT: ICD-10-CM

## 2024-10-31 DIAGNOSIS — J96.11 CHRONIC RESPIRATORY FAILURE WITH HYPOXIA AND HYPERCAPNIA: ICD-10-CM

## 2024-10-31 DIAGNOSIS — Z74.09 IMPAIRED MOBILITY AND ENDURANCE: ICD-10-CM

## 2024-10-31 DIAGNOSIS — N18.31 STAGE 3A CHRONIC KIDNEY DISEASE: ICD-10-CM

## 2024-10-31 DIAGNOSIS — J96.12 CHRONIC RESPIRATORY FAILURE WITH HYPOXIA AND HYPERCAPNIA: ICD-10-CM

## 2024-10-31 DIAGNOSIS — E11.65 TYPE 2 DIABETES MELLITUS WITH HYPERGLYCEMIA, WITH LONG-TERM CURRENT USE OF INSULIN: Primary | ICD-10-CM

## 2024-10-31 DIAGNOSIS — Z79.4 TYPE 2 DIABETES MELLITUS WITH HYPERGLYCEMIA, WITH LONG-TERM CURRENT USE OF INSULIN: Primary | ICD-10-CM

## 2024-10-31 RX ORDER — TIOTROPIUM BROMIDE 18 UG/1
1 CAPSULE ORAL; RESPIRATORY (INHALATION)
Qty: 30 CAPSULE | Refills: 5 | Status: SHIPPED | OUTPATIENT
Start: 2024-10-31

## 2024-10-31 NOTE — OUTREACH NOTE
[facility] End of Month Documentation    This Chronic Medical Management Care Plan for Preston Wallis, 59 y.o. male, has been monitored and managed; reviewed and a new plan of care implemented for the month of October.  A cumulative time of 34  minutes was spent on this patient record this month, including phone call with care giver; electronic communication with primary care provider; electronic communication with pharmacist; chart review; phone call with patient.    Regarding the patient's problems: has Chronic cough; Pneumonia due to COVID-19 virus; Polyneuropathy; Paroxysmal atrial fibrillation; Obstructive sleep apnea; MRSA pneumonia; Low back pain; Chronic diastolic heart failure; Allergies; COPD exacerbation; Chronic anticoagulation; Benign prostatic hyperplasia; Difficulty using continuous positive airway pressure (CPAP) device; Stage 3a chronic kidney disease; Iron deficiency anemia secondary to inadequate dietary iron intake; Vitamin D deficiency; Class 3 severe obesity with serious comorbidity in adult; Lower extremity edema; Elevated alkaline phosphatase level; Venous insufficiency (chronic) (peripheral); Tobacco abuse, in remission; History of Pseudomonas pneumonia; Chronic dyspnea; Gastroesophageal reflux disease; Bronchiectasis without complication; ERIC (acute kidney injury); Altered mental status; Hyperlipidemia; Luetscher's syndrome; Neurogenic bladder; Class 1 obesity; Pneumonia due to Pseudomonas species; Seizures; Primary osteoarthritis of left knee; Other constipation; Chronic obstructive pulmonary disease; Type 2 diabetes mellitus with hyperglycemia, with long-term current use of insulin; Essential hypertension; Stage 3b chronic kidney disease; Annual physical exam; Long-term use of high-risk medication; Personal history of PE (pulmonary embolism); Encounter for aftercare for healing closed traumatic fracture of left femur; Chronic pain of left knee; Primary osteoarthritis of right knee; Sepsis;  Bronchiectasis with acute exacerbation; Bacterial pneumonia; Anemia; Closed fracture of left tibial plateau; Septic joint; Septic arthritis; Skin ulcer of left heel, limited to breakdown of skin; Ulcer of left foot, limited to breakdown of skin; Left foot pain; Wheezing; Acute on chronic respiratory failure with hypercapnia; Chronic respiratory failure with hypoxia and hypercapnia; Acute hypoxic on chronic hypercapnic respiratory failure; Impaired cognition; and Atherosclerosis of native arteries of the extremities with ulceration on their problem list., the following items were addressed: medications; transitions to medical care; medical records; referrals to community service providers and any changes can be found within the plan section of the note.  A detailed listing of time spent for chronic care management is tracked within each outreach encounter.  Current medications include:  has a current medication list which includes the following prescription(s): apixaban, arformoterol, aspirin, atorvastatin, budesonide, bumetanide, buspirone, calcium citrate, carvedilol, vitamin d3, dapagliflozin, docusate sodium, duloxetine, famotidine, ferrous gluconate, finasteride, folic acid, levemir, insulin lispro (1 unit dial), ipratropium-albuterol, isopropyl myristate, ketoconazole, magnesium oxide, montelukast, multivitamin with iron, nifedipine xl, o2, ozempic (0.25 or 0.5 mg/dose), tamsulosin, tiotropium, trazodone, and vitamin c. and the patient is reported to be caregiver will take responsibility for med compliance; patient is compliant with medication protocol,  Medications are reported to be non-effective in controlling symptoms and changes have been made to the medication protocol.  Regarding these diagnoses, referrals were made to the following provider(s):  specialists.  All notes on chart for PCP to review.    The patient was monitored remotely for pain; medications; blood glucose; mood & behavior; activity  level.    The patient's physical needs include:  help taking medications as prescribed; medication education; needs assistance with ADLs; resources for disability needs; DME supplies.     The patient's mental support needs include:  continued support    The patient's cognitive support needs include:  medication; continued support; needs assistance with ADLs; health care    The patient's psychosocial support needs include:  continued support; coordination of community providers; medication management or adherence    The patient's functional needs include: health care coverage; medication education; needs assistance for ADLs; physical healthcare; physician referral; resources for disability needs    The patient's environmental needs include:  resources for disability needs; no involvement in outside activities or no access to other activities    Care Plan overall comments:  Still not at goal, however improving. will continue to follow. Has many upcoming appointments and referrals to specialists. A1c not at goal, continuing ways to improve with addition of new medications. Will follow.    Refer to previous outreach notes for more information on the areas listed above.    Monthly Billing Diagnoses  (E11.65,  Z79.4) Type 2 diabetes mellitus with hyperglycemia, with long-term current use of insulin    (N18.31) Stage 3a chronic kidney disease    (J96.11,  J96.12) Chronic respiratory failure with hypoxia and hypercapnia    (Z74.09) Impaired mobility and endurance    (Z71.89) Medication care plan discussed with patient    Medications   Medications have been reconciled    Care Plan progress this month:      Recently Modified Care Plans Updates made since 9/30/2024 12:00 AM      No recently modified care plans.            Current Specialty Plan of Care Status signed by both patient and provider    Instructions   Patient was provided an electronic copy of care plan  CCM services were explained and offered and patient has  accepted these services.  Patient has given their written consent to receive CCM services and understands that this includes the authorization of electronic communication of medical information with the other treating providers.  Patient understands that they may stop CCM services at any time and these changes will be effective at the end of the calendar month and will effectively revocate the agreement of CCM services.  Patient understands that only one practitioner can furnish and be paid for CCM services during one calendar month.  Patient also understands that there may be co-payment or deductible fees in association with CCM services.  Patient will continue with at least monthly follow-up calls with the Ambulatory .    Tara GOMEZ  Ambulatory Case Management    10/31/2024, 13:36 EDT

## 2024-10-31 NOTE — TELEPHONE ENCOUNTER
Spiriva Respimat not a  preferred medication, insurance requesting Spiriva HandiHaler. Spoke with patients wife-on verbal release form, she is aware of medication change.

## 2024-11-05 ENCOUNTER — PATIENT OUTREACH (OUTPATIENT)
Dept: CASE MANAGEMENT | Facility: OTHER | Age: 59
End: 2024-11-05
Payer: COMMERCIAL

## 2024-11-05 DIAGNOSIS — E11.65 TYPE 2 DIABETES MELLITUS WITH HYPERGLYCEMIA, WITH LONG-TERM CURRENT USE OF INSULIN: Primary | ICD-10-CM

## 2024-11-05 DIAGNOSIS — Z79.4 TYPE 2 DIABETES MELLITUS WITH HYPERGLYCEMIA, WITH LONG-TERM CURRENT USE OF INSULIN: Primary | ICD-10-CM

## 2024-11-05 NOTE — OUTREACH NOTE
"AMBULATORY CASE MANAGEMENT NOTE    Names and Relationships of Patient/Support Persons: Contact: Kami Wallis; Relationship: Emergency Contact  Contact: Preston Wallis \"Gómez\"; Relationship: Self  Contact: [facility] [address] [phone] [facility] [phone] FX; Relationship: Pharmacy  Contact: [facility] [address] [place] [address] [address] [facility] [phone] [facility] [phone] FX; Relationship: Pharmacy -     Elizabeth called stating that Gómez has been out of his Ozempic for 2 weeks, the dosage is wrong and is showing too early.  [facility]ed [facility] to [facility] the Rx to [facility] with the increased dose.  Called [facility] to cancel the 0.25mg dose.    Notified Gómez.    Also brought in meds, was able to put meds in planners.  Did discuss with home health that this is a great teaching opportunity while in the home.      Gómez is having a procedure next week, so we pulled his eliquis and farxiga as directed from his medication planner starting on Thursday.     Tara GOMEZ  Ambulatory Case Management    11/5/2024, 13:11 EST  "

## 2024-11-06 ENCOUNTER — PATIENT OUTREACH (OUTPATIENT)
Dept: CASE MANAGEMENT | Facility: OTHER | Age: 59
End: 2024-11-06
Payer: COMMERCIAL

## 2024-11-06 DIAGNOSIS — J96.12 CHRONIC RESPIRATORY FAILURE WITH HYPOXIA AND HYPERCAPNIA: Primary | ICD-10-CM

## 2024-11-06 DIAGNOSIS — J96.11 CHRONIC RESPIRATORY FAILURE WITH HYPOXIA AND HYPERCAPNIA: Primary | ICD-10-CM

## 2024-11-06 NOTE — OUTREACH NOTE
AMBULATORY CASE MANAGEMENT NOTE    Names and Relationships of Patient/Support Persons: Contact: Florecita Adrian,  [facility] [employer]; Relationship:  -     Received a call from Florecita Adrian,  for [facility].  She has Gómez enrolled in a program for special diagnosis and he is in for his seizure disorder.  We discussed this at length, his seizure meds have been discontinued.    She states that when she attempts to review his medications he has no idea.  Explained that I am trying to turn this task back over to the family and discussed with home health yesterday.    Reviewed all meds with Florecita.    I had some questions about his nebulizer and respiratory meds.  Pulmonary is going to clarify and get in touch.      Tara GOMEZ  Ambulatory Case Management    11/6/2024, 15:02 EST

## 2024-11-20 ENCOUNTER — TELEPHONE (OUTPATIENT)
Dept: DIABETES SERVICES | Facility: HOSPITAL | Age: 59
End: 2024-11-20
Payer: COMMERCIAL

## 2024-11-21 DIAGNOSIS — E11.65 TYPE 2 DIABETES MELLITUS WITH HYPERGLYCEMIA, WITH LONG-TERM CURRENT USE OF INSULIN: ICD-10-CM

## 2024-11-21 DIAGNOSIS — G47.00 INSOMNIA, UNSPECIFIED TYPE: ICD-10-CM

## 2024-11-21 DIAGNOSIS — I48.0 PAROXYSMAL ATRIAL FIBRILLATION: ICD-10-CM

## 2024-11-21 DIAGNOSIS — K21.9 GASTROESOPHAGEAL REFLUX DISEASE WITHOUT ESOPHAGITIS: ICD-10-CM

## 2024-11-21 DIAGNOSIS — R13.10 DYSPHAGIA, UNSPECIFIED TYPE: ICD-10-CM

## 2024-11-21 DIAGNOSIS — F41.9 ANXIETY: ICD-10-CM

## 2024-11-21 DIAGNOSIS — L97.521 ULCER OF LEFT FOOT, LIMITED TO BREAKDOWN OF SKIN: ICD-10-CM

## 2024-11-21 DIAGNOSIS — K59.09 OTHER CONSTIPATION: ICD-10-CM

## 2024-11-21 DIAGNOSIS — N18.32 STAGE 3B CHRONIC KIDNEY DISEASE (CKD): ICD-10-CM

## 2024-11-21 DIAGNOSIS — E78.2 MIXED HYPERLIPIDEMIA: ICD-10-CM

## 2024-11-21 DIAGNOSIS — D50.9 IRON DEFICIENCY ANEMIA, UNSPECIFIED IRON DEFICIENCY ANEMIA TYPE: ICD-10-CM

## 2024-11-21 DIAGNOSIS — Z79.4 TYPE 2 DIABETES MELLITUS WITH HYPERGLYCEMIA, WITH LONG-TERM CURRENT USE OF INSULIN: ICD-10-CM

## 2024-11-21 DIAGNOSIS — N40.1 BENIGN PROSTATIC HYPERPLASIA WITH URINARY RETENTION: ICD-10-CM

## 2024-11-21 DIAGNOSIS — I50.32 CHRONIC DIASTOLIC (CONGESTIVE) HEART FAILURE: ICD-10-CM

## 2024-11-21 DIAGNOSIS — E55.9 VITAMIN D DEFICIENCY: ICD-10-CM

## 2024-11-21 DIAGNOSIS — R33.8 BENIGN PROSTATIC HYPERPLASIA WITH URINARY RETENTION: ICD-10-CM

## 2024-11-23 ENCOUNTER — READMISSION MANAGEMENT (OUTPATIENT)
Dept: CALL CENTER | Facility: HOSPITAL | Age: 59
End: 2024-11-23
Payer: COMMERCIAL

## 2024-11-24 NOTE — OUTREACH NOTE
Prep Survey      Flowsheet Row Responses   [facility] facility patient discharged from? Non-BH   Is LACE score < 7 ? Non-BH Discharge   Eligibility [facility]   Date of Admission 11/19/24   Date of Discharge 11/23/24   Discharge Disposition Home or Self Care   Discharge diagnosis Sepsis due to pneumonia   Does the patient have one of the following disease processes/diagnoses(primary or secondary)? Sepsis   Does the patient have Home health ordered? No   Prep survey completed? Yes            Romelia RODARTE - Registered Nurse

## 2024-11-25 ENCOUNTER — PATIENT OUTREACH (OUTPATIENT)
Dept: CASE MANAGEMENT | Facility: OTHER | Age: 59
End: 2024-11-25
Payer: COMMERCIAL

## 2024-11-25 ENCOUNTER — TRANSITIONAL CARE MANAGEMENT TELEPHONE ENCOUNTER (OUTPATIENT)
Dept: CALL CENTER | Facility: HOSPITAL | Age: 59
End: 2024-11-25
Payer: COMMERCIAL

## 2024-11-25 DIAGNOSIS — E11.65 TYPE 2 DIABETES MELLITUS WITH HYPERGLYCEMIA, WITH LONG-TERM CURRENT USE OF INSULIN: ICD-10-CM

## 2024-11-25 DIAGNOSIS — J96.12 CHRONIC RESPIRATORY FAILURE WITH HYPOXIA AND HYPERCAPNIA: Primary | ICD-10-CM

## 2024-11-25 DIAGNOSIS — Z71.89 MEDICATION CARE PLAN DISCUSSED WITH PATIENT: ICD-10-CM

## 2024-11-25 DIAGNOSIS — Z74.09 IMPAIRED MOBILITY AND ENDURANCE: ICD-10-CM

## 2024-11-25 DIAGNOSIS — J96.11 CHRONIC RESPIRATORY FAILURE WITH HYPOXIA AND HYPERCAPNIA: Primary | ICD-10-CM

## 2024-11-25 DIAGNOSIS — Z79.4 TYPE 2 DIABETES MELLITUS WITH HYPERGLYCEMIA, WITH LONG-TERM CURRENT USE OF INSULIN: ICD-10-CM

## 2024-11-25 DIAGNOSIS — N18.31 STAGE 3A CHRONIC KIDNEY DISEASE: ICD-10-CM

## 2024-11-25 RX ORDER — BUMETANIDE 2 MG/1
2 TABLET ORAL 2 TIMES DAILY
Qty: 60 TABLET | Refills: 3 | Status: SHIPPED | OUTPATIENT
Start: 2024-11-25

## 2024-11-25 RX ORDER — ASPIRIN 81 MG/1
81 TABLET, COATED ORAL EVERY MORNING
Qty: 30 TABLET | Refills: 3 | Status: SHIPPED | OUTPATIENT
Start: 2024-11-25

## 2024-11-25 RX ORDER — CARVEDILOL 25 MG/1
25 TABLET ORAL EVERY 12 HOURS
Qty: 60 TABLET | Refills: 3 | Status: SHIPPED | OUTPATIENT
Start: 2024-11-25

## 2024-11-25 RX ORDER — ASCORBIC ACID 500 MG
500 TABLET ORAL 2 TIMES DAILY
Qty: 60 TABLET | Refills: 3 | Status: SHIPPED | OUTPATIENT
Start: 2024-11-25

## 2024-11-25 RX ORDER — APIXABAN 5 MG/1
5 TABLET, FILM COATED ORAL EVERY 12 HOURS
Qty: 60 TABLET | Refills: 3 | Status: SHIPPED | OUTPATIENT
Start: 2024-11-25

## 2024-11-25 RX ORDER — BUSPIRONE HYDROCHLORIDE 15 MG/1
15 TABLET ORAL 2 TIMES DAILY
Qty: 60 TABLET | Refills: 3 | Status: SHIPPED | OUTPATIENT
Start: 2024-11-25

## 2024-11-25 RX ORDER — FOLIC ACID 1 MG/1
1000 TABLET ORAL
Qty: 30 TABLET | Refills: 3 | Status: SHIPPED | OUTPATIENT
Start: 2024-11-25

## 2024-11-25 RX ORDER — DAPAGLIFLOZIN 5 MG/1
5 TABLET, FILM COATED ORAL EVERY MORNING
Qty: 30 TABLET | Refills: 3 | Status: SHIPPED | OUTPATIENT
Start: 2024-11-25

## 2024-11-25 RX ORDER — NIFEDIPINE 30 MG/1
30 TABLET, EXTENDED RELEASE ORAL EVERY MORNING
Qty: 30 TABLET | Refills: 3 | Status: SHIPPED | OUTPATIENT
Start: 2024-11-25

## 2024-11-25 RX ORDER — MULTIVITAMIN/IRON/FOLIC ACID 18MG-0.4MG
1 TABLET ORAL EVERY MORNING
Qty: 30 EACH | Refills: 3 | Status: SHIPPED | OUTPATIENT
Start: 2024-11-25

## 2024-11-25 RX ORDER — DOCUSATE SODIUM 100 MG/1
100 CAPSULE, LIQUID FILLED ORAL 2 TIMES DAILY
Qty: 60 CAPSULE | Refills: 3 | Status: SHIPPED | OUTPATIENT
Start: 2024-11-25

## 2024-11-25 RX ORDER — ATORVASTATIN CALCIUM 20 MG/1
20 TABLET, FILM COATED ORAL
Qty: 30 TABLET | Refills: 3 | Status: SHIPPED | OUTPATIENT
Start: 2024-11-25

## 2024-11-25 RX ORDER — MAGNESIUM OXIDE 400 MG/1
1 TABLET ORAL
Qty: 30 TABLET | Refills: 3 | Status: SHIPPED | OUTPATIENT
Start: 2024-11-25

## 2024-11-25 RX ORDER — FINASTERIDE 5 MG/1
5 TABLET, FILM COATED ORAL
Qty: 30 TABLET | Refills: 3 | Status: SHIPPED | OUTPATIENT
Start: 2024-11-25

## 2024-11-25 RX ORDER — IBUPROFEN 200 MG
950 CAPSULE ORAL
Qty: 30 TABLET | Refills: 3 | Status: SHIPPED | OUTPATIENT
Start: 2024-11-25

## 2024-11-25 RX ORDER — TAMSULOSIN HYDROCHLORIDE 0.4 MG/1
1 CAPSULE ORAL
Qty: 30 CAPSULE | Refills: 3 | Status: SHIPPED | OUTPATIENT
Start: 2024-11-25

## 2024-11-25 RX ORDER — FAMOTIDINE 40 MG/1
40 TABLET, FILM COATED ORAL EVERY MORNING
Qty: 30 TABLET | Refills: 3 | Status: SHIPPED | OUTPATIENT
Start: 2024-11-25

## 2024-11-25 RX ORDER — CHOLECALCIFEROL (VITAMIN D3) 50 MCG
1 TABLET ORAL EVERY MORNING
Qty: 30 TABLET | Refills: 3 | Status: SHIPPED | OUTPATIENT
Start: 2024-11-25

## 2024-11-25 RX ORDER — FERROUS GLUCONATE 324(38)MG
324 TABLET ORAL EVERY MORNING
Qty: 30 TABLET | Refills: 3 | Status: SHIPPED | OUTPATIENT
Start: 2024-11-25

## 2024-11-25 RX ORDER — TRAZODONE HYDROCHLORIDE 50 MG/1
50 TABLET, FILM COATED ORAL
Qty: 30 TABLET | Refills: 3 | OUTPATIENT
Start: 2024-11-25

## 2024-11-25 NOTE — OUTREACH NOTE
Call Center TCM Note      Flowsheet Row Responses   [facility] patient discharged from? Non-BH  [[facility]]   Does the patient have one of the following disease processes/diagnoses(primary or secondary)? Sepsis   TCM attempt successful? Yes  [vr form 2021 lists catherine Mcclure]   Call start time 1040   Call end time 1047   Discharge diagnosis Sepsis due to pneumonia   Person spoke with today (if not patient) and relationship catherine Mcclure   Medication alerts for this patient IV ATBs-Crystalrem, wife reports that a bit overwhelming for her at first as only education was from paperwork received but had support from someone in the family for assist. Encouraged to reach out to HH or number on paperwork for questions.   Meds reviewed with patient/caregiver? Yes   Is the patient having any side effects they believe may be caused by any medication additions or changes? No   Does the patient have all medications ordered at discharge? Yes   Is the patient taking all medications as directed (includes completed medication regime)? Yes   Comments Offered appt on 11/26/24 but pt needs time to make arrangements with [facility] for trnasports--no other appts available with PCP, will send a message for f/u   Does the patient have an appointment with their PCP within 7-14 days of discharge? No   Nursing Interventions Routed TCM call to PCP office   What is the Home health agency?  [facility]--reports visits on a routine basis and assists with wound care   Has home health visited the patient within 72 hours of discharge? Unsure   DME comments Pt uses O2 at 2 liters at home, aware to monitor sats to keep in 90s.  No longer uses bipap as unable to tolerate and device returned.  Pt uses his chest vest nightly.   Psychosocial issues? No   Did the patient receive a copy of their discharge instructions? Yes   Nursing interventions Reviewed instructions with patient   What is the patient's perception of their health status since discharge? Improving   [Pt is doing better--still has residual cough.  Wife is aware to monitor for worsening s/s resp issues.  Taking IV atbs as ordered.  HH following for wound care.]   Is the patient/caregiver able to teach back signs and symptoms related to disease process for when to call PCP? Yes   Is the patient/caregiver able to teach back signs and symptoms related to disease process for when to call 911? Yes   TCM call completed? Yes   Call end time 1047            Carmen Zurita RN    11/25/2024, 10:57 EST

## 2024-11-25 NOTE — OUTREACH NOTE
"AMBULATORY CASE MANAGEMENT NOTE    Names and Relationships of Patient/Support Persons: Contact: Preston Wallis \"Gómez\"; Relationship: Self -     Reached out to Elizabeth today, Gómez was discharged Saturday.  He is on IV Meropenem, Elizabeth is administering.   Discussed with Elizabeth that he has cancelled multiple specialist and we need to get him rescheduled, she is aware.    Attempted to schedule a [facility] appointment, nothing available.  Spoke with PCP, scheduled appointment.   Reviewed medication discharge, increased insulin to 20 units.  All other meds sent in for refill per pharmacy request.     Tara R  Ambulatory Case Management    11/25/2024, 12:14 EST  CCM End of Month Documentation    This Chronic Medical Management Care Plan for Preston Wallis, 59 y.o. male, has been monitored and managed; reviewed and a new plan of care implemented for the month of November.  A cumulative time of 66  minutes was spent on this patient record this month, including phone call with care giver; electronic communication with primary care provider; electronic communication with pharmacist; chart review; phone call with patient.    Regarding the patient's problems: has Chronic cough; Pneumonia due to COVID-19 virus; Polyneuropathy; Paroxysmal atrial fibrillation; Obstructive sleep apnea; MRSA pneumonia; Low back pain; Chronic diastolic heart failure; Allergies; COPD exacerbation; Chronic anticoagulation; Benign prostatic hyperplasia; Difficulty using continuous positive airway pressure (CPAP) device; Stage 3a chronic kidney disease; Iron deficiency anemia secondary to inadequate dietary iron intake; Vitamin D deficiency; Class 3 severe obesity with serious comorbidity in adult; Lower extremity edema; Elevated alkaline phosphatase level; Venous insufficiency (chronic) (peripheral); Tobacco abuse, in remission; History of Pseudomonas pneumonia; Chronic dyspnea; Gastroesophageal reflux disease; Bronchiectasis without complication; ERIC (acute " kidney injury); Altered mental status; Hyperlipidemia; Luetscher's syndrome; Neurogenic bladder; Class 1 obesity; Pneumonia due to Pseudomonas species; Seizures; Primary osteoarthritis of left knee; Other constipation; Chronic obstructive pulmonary disease; Type 2 diabetes mellitus with hyperglycemia, with long-term current use of insulin; Essential hypertension; Stage 3b chronic kidney disease; Annual physical exam; Long-term use of high-risk medication; Personal history of PE (pulmonary embolism); Encounter for aftercare for healing closed traumatic fracture of left femur; Chronic pain of left knee; Primary osteoarthritis of right knee; Sepsis; Bronchiectasis with acute exacerbation; Bacterial pneumonia; Anemia; Closed fracture of left tibial plateau; Septic joint; Septic arthritis; Skin ulcer of left heel, limited to breakdown of skin; Ulcer of left foot, limited to breakdown of skin; Left foot pain; Wheezing; Acute on chronic respiratory failure with hypercapnia; Chronic respiratory failure with hypoxia and hypercapnia; Acute hypoxic on chronic hypercapnic respiratory failure; Impaired cognition; and Atherosclerosis of native arteries of the extremities with ulceration on their problem list., the following items were addressed: medications; transitions to medical care; medical records; referrals to community service providers and any changes can be found within the plan section of the note.  A detailed listing of time spent for chronic care management is tracked within each outreach encounter.  Current medications include:  has a current medication list which includes the following prescription(s): apixaban, arformoterol, aspirin, atorvastatin, budesonide, bumetanide, buspirone, calcium citrate, carvedilol, vitamin d3, dapagliflozin, docusate sodium, duloxetine, famotidine, ferrous gluconate, finasteride, folic acid, levemir, insulin lispro (1 unit dial), ipratropium-albuterol, isopropyl myristate, ketoconazole,  magnesium oxide, montelukast, gnp one daily plus iron, nifedipine xl, o2, ozempic (0.25 or 0.5 mg/dose), tamsulosin, tiotropium, trazodone, and vitamin c. and the patient is reported to be caregiver will take responsibility for med compliance; patient is compliant with medication protocol,  Medications are reported to be non-effective in controlling symptoms and changes have been made to the medication protocol.  Regarding these diagnoses, referrals were made to the following provider(s):  specialists.  All notes on chart for PCP to review.    The patient was monitored remotely for pain; medications; blood glucose; mood & behavior; activity level.    The patient's physical needs include:  help taking medications as prescribed; medication education; needs assistance with ADLs; resources for disability needs; DME supplies.     The patient's mental support needs include:  continued support    The patient's cognitive support needs include:  medication; continued support; needs assistance with ADLs; health care    The patient's psychosocial support needs include:  continued support; coordination of community providers; medication management or adherence    The patient's functional needs include: health care coverage; medication education; needs assistance for ADLs; physical healthcare; physician referral; resources for disability needs    The patient's environmental needs include:  resources for disability needs; no involvement in outside activities or no access to other activities    Care Plan overall comments:  Still not at goal, however improving. will continue to follow. Has many upcoming appointments and referrals to specialists. A1c not at goal, continuing ways to improve with addition of new medications. Will follow, new hospitalization.    Refer to previous outreach notes for more information on the areas listed above.    Monthly Billing Diagnoses  (J96.11,  J96.12) Chronic respiratory failure with hypoxia and  hypercapnia    (E11.65,  Z79.4) Type 2 diabetes mellitus with hyperglycemia, with long-term current use of insulin    (N18.31) Stage 3a chronic kidney disease    (Z74.09) Impaired mobility and endurance    (Z71.89) Medication care plan discussed with patient    Medications   Medications have been reconciled    Care Plan progress this month:      Recently Modified Care Plans Updates made since 10/25/2024 12:00 AM      No recently modified care plans.            Current Specialty Plan of Care Status signed by both patient and provider    Instructions   Patient was provided an electronic copy of care plan  CCM services were explained and offered and patient has accepted these services.  Patient has given their written consent to receive CCM services and understands that this includes the authorization of electronic communication of medical information with the other treating providers.  Patient understands that they may stop CCM services at any time and these changes will be effective at the end of the calendar month and will effectively revocate the agreement of CCM services.  Patient understands that only one practitioner can furnish and be paid for CCM services during one calendar month.  Patient also understands that there may be co-payment or deductible fees in association with CCM services.  Patient will continue with at least monthly follow-up calls with the Ambulatory .    Tara GOMEZ  Ambulatory Case Management    11/25/2024, 12:14 EST

## 2024-12-03 ENCOUNTER — PATIENT OUTREACH (OUTPATIENT)
Dept: CASE MANAGEMENT | Facility: OTHER | Age: 59
End: 2024-12-03
Payer: COMMERCIAL

## 2024-12-03 DIAGNOSIS — N18.31 TYPE 2 DIABETES MELLITUS WITH STAGE 3A CHRONIC KIDNEY DISEASE, WITH LONG-TERM CURRENT USE OF INSULIN: Primary | ICD-10-CM

## 2024-12-03 DIAGNOSIS — E11.65 TYPE 2 DIABETES MELLITUS WITH HYPERGLYCEMIA, WITH LONG-TERM CURRENT USE OF INSULIN: Primary | ICD-10-CM

## 2024-12-03 DIAGNOSIS — Z79.4 TYPE 2 DIABETES MELLITUS WITH HYPERGLYCEMIA, WITH LONG-TERM CURRENT USE OF INSULIN: Primary | ICD-10-CM

## 2024-12-03 DIAGNOSIS — Z79.4 TYPE 2 DIABETES MELLITUS WITH STAGE 3A CHRONIC KIDNEY DISEASE, WITH LONG-TERM CURRENT USE OF INSULIN: Primary | ICD-10-CM

## 2024-12-03 DIAGNOSIS — E11.22 TYPE 2 DIABETES MELLITUS WITH STAGE 3A CHRONIC KIDNEY DISEASE, WITH LONG-TERM CURRENT USE OF INSULIN: Primary | ICD-10-CM

## 2024-12-03 RX ORDER — FLASH GLUCOSE SENSOR
1 KIT MISCELLANEOUS
COMMUNITY

## 2024-12-03 NOTE — OUTREACH NOTE
AMBULATORY CASE MANAGEMENT NOTE    Names and Relationships of Patient/Support Persons: Contact: Kami Wallis; Relationship: Emergency Contact -     Kami called, she was needing a couple of things.  Post discharge Gómez has had to reschedule a lot of his appointments.  Needs pulmonology, urology, wound care and diabetes management.   Discussed his diabetes, poor control at this time.  She states that on Thanksgiving, his reader was reading [place].  Asked if he increase his insulin to 20 units like I read on the hospital records, she said no because on the discharge list it only stated 10 units, noone told her to increase.  The discharge summary also shows that he is taking both lantus and levemir so it is wrong.  He needs a sooner than March appointment with diabetes management.        Tara GOMEZ  Ambulatory Case Management    12/3/2024, 15:22 EST

## 2024-12-05 ENCOUNTER — HOSPITAL ENCOUNTER (OUTPATIENT)
Dept: GENERAL RADIOLOGY | Facility: HOSPITAL | Age: 59
Discharge: HOME | End: 2024-12-05
Payer: COMMERCIAL

## 2024-12-05 ENCOUNTER — LAB (OUTPATIENT)
Dept: LAB | Facility: HOSPITAL | Age: 59
End: 2024-12-05
Payer: COMMERCIAL

## 2024-12-05 ENCOUNTER — OFFICE VISIT (OUTPATIENT)
Dept: FAMILY MEDICINE CLINIC | Age: 59
End: 2024-12-05
Payer: COMMERCIAL

## 2024-12-05 VITALS
SYSTOLIC BLOOD PRESSURE: 135 MMHG | WEIGHT: 244 LBS | HEART RATE: 79 BPM | OXYGEN SATURATION: 94 % | TEMPERATURE: 97.7 F | HEIGHT: 69 IN | BODY MASS INDEX: 36.14 KG/M2 | DIASTOLIC BLOOD PRESSURE: 53 MMHG

## 2024-12-05 DIAGNOSIS — E55.9 VITAMIN D DEFICIENCY: ICD-10-CM

## 2024-12-05 DIAGNOSIS — K21.9 GASTROESOPHAGEAL REFLUX DISEASE WITHOUT ESOPHAGITIS: ICD-10-CM

## 2024-12-05 DIAGNOSIS — J44.1 COPD EXACERBATION: ICD-10-CM

## 2024-12-05 DIAGNOSIS — I10 ESSENTIAL HYPERTENSION: ICD-10-CM

## 2024-12-05 DIAGNOSIS — E11.65 TYPE 2 DIABETES MELLITUS WITH HYPERGLYCEMIA, WITH LONG-TERM CURRENT USE OF INSULIN: ICD-10-CM

## 2024-12-05 DIAGNOSIS — G47.00 INSOMNIA, UNSPECIFIED TYPE: ICD-10-CM

## 2024-12-05 DIAGNOSIS — L97.521 ULCER OF LEFT FOOT, LIMITED TO BREAKDOWN OF SKIN: ICD-10-CM

## 2024-12-05 DIAGNOSIS — Z79.4 TYPE 2 DIABETES MELLITUS WITH HYPERGLYCEMIA, WITH LONG-TERM CURRENT USE OF INSULIN: ICD-10-CM

## 2024-12-05 DIAGNOSIS — D50.9 IRON DEFICIENCY ANEMIA, UNSPECIFIED IRON DEFICIENCY ANEMIA TYPE: ICD-10-CM

## 2024-12-05 DIAGNOSIS — J15.1 PNEUMONIA DUE TO PSEUDOMONAS SPECIES, UNSPECIFIED LATERALITY, UNSPECIFIED PART OF LUNG: ICD-10-CM

## 2024-12-05 DIAGNOSIS — N40.1 BENIGN PROSTATIC HYPERPLASIA WITH URINARY RETENTION: ICD-10-CM

## 2024-12-05 DIAGNOSIS — I50.32 CHRONIC DIASTOLIC (CONGESTIVE) HEART FAILURE: ICD-10-CM

## 2024-12-05 DIAGNOSIS — R29.898 WEAKNESS OF BOTH LOWER EXTREMITIES: ICD-10-CM

## 2024-12-05 DIAGNOSIS — N18.32 STAGE 3B CHRONIC KIDNEY DISEASE (CKD): ICD-10-CM

## 2024-12-05 DIAGNOSIS — I48.0 PAROXYSMAL ATRIAL FIBRILLATION: ICD-10-CM

## 2024-12-05 DIAGNOSIS — R33.8 BENIGN PROSTATIC HYPERPLASIA WITH URINARY RETENTION: ICD-10-CM

## 2024-12-05 DIAGNOSIS — J15.1 PNEUMONIA DUE TO PSEUDOMONAS SPECIES, UNSPECIFIED LATERALITY, UNSPECIFIED PART OF LUNG: Primary | ICD-10-CM

## 2024-12-05 LAB
BASOPHILS # BLD AUTO: 0.07 10*3/MM3 (ref 0–0.2)
BASOPHILS NFR BLD AUTO: 0.5 % (ref 0–1.5)
DEPRECATED RDW RBC AUTO: 44.2 FL (ref 37–54)
EOSINOPHIL # BLD AUTO: 0.37 10*3/MM3 (ref 0–0.4)
EOSINOPHIL NFR BLD AUTO: 2.7 % (ref 0.3–6.2)
ERYTHROCYTE [DISTWIDTH] IN BLOOD BY AUTOMATED COUNT: 12.9 % (ref 12.3–15.4)
HCT VFR BLD AUTO: 26 % (ref 37.5–51)
HGB BLD-MCNC: 8.2 G/DL (ref 13–17.7)
IMM GRANULOCYTES # BLD AUTO: 0.02 10*3/MM3 (ref 0–0.05)
IMM GRANULOCYTES NFR BLD AUTO: 0.1 % (ref 0–0.5)
LYMPHOCYTES # BLD AUTO: 2.21 10*3/MM3 (ref 0.7–3.1)
LYMPHOCYTES NFR BLD AUTO: 16.3 % (ref 19.6–45.3)
MCH RBC QN AUTO: 28.9 PG (ref 26.6–33)
MCHC RBC AUTO-ENTMCNC: 31.5 G/DL (ref 31.5–35.7)
MCV RBC AUTO: 91.5 FL (ref 79–97)
MONOCYTES # BLD AUTO: 0.87 10*3/MM3 (ref 0.1–0.9)
MONOCYTES NFR BLD AUTO: 6.4 % (ref 5–12)
NEUTROPHILS NFR BLD AUTO: 10.04 10*3/MM3 (ref 1.7–7)
NEUTROPHILS NFR BLD AUTO: 74 % (ref 42.7–76)
PLATELET # BLD AUTO: 360 10*3/MM3 (ref 140–450)
PMV BLD AUTO: 8.8 FL (ref 6–12)
RBC # BLD AUTO: 2.84 10*6/MM3 (ref 4.14–5.8)
WBC NRBC COR # BLD AUTO: 13.58 10*3/MM3 (ref 3.4–10.8)

## 2024-12-05 PROCEDURE — 80053 COMPREHEN METABOLIC PANEL: CPT

## 2024-12-05 PROCEDURE — 85025 COMPLETE CBC W/AUTO DIFF WBC: CPT

## 2024-12-05 PROCEDURE — 36415 COLL VENOUS BLD VENIPUNCTURE: CPT

## 2024-12-05 PROCEDURE — 71046 X-RAY EXAM CHEST 2 VIEWS: CPT

## 2024-12-05 RX ORDER — TRAZODONE HYDROCHLORIDE 100 MG/1
150 TABLET ORAL NIGHTLY
Qty: 135 TABLET | Refills: 1 | Status: SHIPPED | OUTPATIENT
Start: 2024-12-05

## 2024-12-05 RX ORDER — FLASH GLUCOSE SENSOR
1 KIT MISCELLANEOUS
Qty: 9 EACH | Refills: 1 | OUTPATIENT
Start: 2024-12-05

## 2024-12-05 NOTE — PROGRESS NOTES
Preston Wallis presents to [facility].    Chief Complaint: transition of care    Subjective     History of Present Illness:  HPI  I am following up with Gómez today for transition of care.  He was admitted on 11/19/2024 to [facility].  He was discharged home on 11/23/2024.  He was diagnosed with pneumonia due to multiresistant Pseudomonas with chronic respiratory failure and COPD exacerbation.  Respiratory culture also showed Klebsiella, Serratia and Enterobacter.  He has a chronic diabetic ulcer that was also treated.  He has chronic congestive heart failure and paroxysmal atrial fibrillation.   He has sleep apnea and is supposed to be using the CPAP machine.  He is morbidly obese.  He has history of chronic respiratory failure.     Upon admit to the hospital he was very dyspneic and was placed on higher dose of oxygen.  He was treated with cefepime and then due to a gene CTX imaging detection he was changed to meropenem.  On discharge he was able to be discharged home back down to 2 L oxygen daily.  Chest x-ray showed patchy right lung opacities.  A PICC line has been placed for ongoing IV meropenem antibiotic treatment and then his port was used for the completion of meropenem IV antibiotic treatment and he completed this this past Monday, 12/2/2024.   Current pulmonary meds are spiriva and albuterol inhalers and the following nebs:budesonide, arformoterolol.   Current oxygen level is 2 L nasal cannula    He has chronic kidney disease stage IV with a creatinine of 2.4 in the hospital.  He is managed by nephrology. He has chronic urinary retention and self caths.  He also has underlying diabetes which is very poorly controlled at this time.    For his congestive heart failure he is on Bumex 2 mg twice daily.  For his atrial fibrillation he is taking Coreg and Eliquis 5 mg twice daily.  He is also on nifedipine CCB but this is for his esophageal spasms.  His most recent echo showed  a preserved ejection fraction with diastolic heart failure    He has underlying iron deficiency and his hemoglobin was stable at 8.7    His diabetes is managed with endocrinology Neli Paredes and he has a follow-up appointment with her next week.  His blood sugars have been running greater than 300.  Current treatment includes Farxiga 5 mg daily, insulin lispro sliding scale insulin and Levemir versus Lantus 10 units subcu daily but in the hospital he was given 20 units subcu daily while in the hospital.  He is also under current treatment for diabetic foot ulcers and has been for quite some time.  This is stable.  Dr. Oneal did debride his foot ulcers in the past all the way the muscle and they are healing from the inside out.  He is continuing daily dressing with Betadine      Labs while in the hospital include a WBC of 17.9, hemoglobin 9.1, hematocrit 28.6, platelets 357, blood sugar 123/238/223/253/348, alkaline phosphatase 236, ALT 32, AST 23, sodium 143, potassium 4.3, chloride 98, creatinine 2.36, calcium 9.4.  Blood culture no growth in 48 hours.  Urine culture no growth, sputum showed multi resistant Pseudomonas.    Pneumonia PCR Panel [[id number]] (Abnormal) Collected: 11/19/24 6230   Order Status: Completed Specimen: Sputum from Expectorated Updated: 11/20/24 5957   ACINETOBACTER CALCOACETICUS-BAUMANNII COMPLEX Not detected   ENTEROBACTER CLOACAE COMPLEX Detected   ESCHERICHIA COLI Not detected   HAEMOPHILUS INFLUENZAE Not detected   KLEBSIELLA AEROGENES Not detected so if he was admitted on 1120 he was discharged 1120 3/24/2025 to 6/27/2028 [id number]  KLEBSIELLA OXYTOCA Detected   KLEBSIELLA PNEUMONIAE GROUP Not detected   MORAXELLA CATARRHALIS Not detected   PROTEUS Not detected   PSEUDOMONAS AERUGINOSA Detected   SERRATIA MARCESCENS Detected   STAPHYLOCOCCUS AUREUS Not detected   STREPTOCOCCUS AGALACTIAE (GROUP B) Not detected   STREPTOCOCCUS PNEUMONIAE Not detected   STREPTOCOCCUS PYOGENES (GROUP A)  Not detected   CTX-M Detected   IMP Not detected   KPC Not detected   MEC A/C and MREJ Non Applicable   NDM Not detected   OXA-48-LIKE Not detected   VIM Not detected   CHLAMYDIA PNEUMONIAE Not detected   LEGIONELLA PNEUMOPHILA Not detected   MYCOPLASMA PNEUMONIAE Not detected   ADENOVIRUS Not detected   CORONAVIRUS (NOT COVID19) Not detected   HUMAN METAPNEUMOVIRUS Not detected   HUMAN RHINOVIRUS/ENTEROVIRUS Not detected   INFLUENZA A Not detected   INFLUENZA B Not detected   PARAINFLUENZA VIRUS Not detected   RESPIRATORY SYNCYTIAL VIRUS Not detected       Result Review   The following data was reviewed by Kimmy Riley MD on 12/05/2024.  Lab Results   Component Value Date    WBC 13.08 (H) 08/15/2024    HGB 8.6 (L) 08/15/2024    HCT 27.7 (L) 08/15/2024    MCV 89.4 08/15/2024     08/15/2024     Lab Results   Component Value Date    GLUCOSE 221 (H) 08/15/2024    BUN 49 (H) 08/15/2024    CREATININE 2.46 (H) 08/15/2024     08/15/2024    K 4.9 08/15/2024    CL 96 (L) 08/15/2024    CALCIUM 9.7 08/15/2024    PROTEINTOT 8.3 07/18/2024    ALBUMIN 3.7 07/18/2024    ALT 25 07/18/2024    AST 27 07/18/2024    ALKPHOS 196 (H) 07/18/2024    BILITOT <0.2 07/18/2024    GLOB 4.6 07/18/2024    AGRATIO 0.8 07/18/2024    BCR 19.9 08/15/2024    ANIONGAP 10.4 08/15/2024    EGFR 29.4 (L) 08/15/2024     Lab Results   Component Value Date    CHOL 109 07/18/2024    CHLPL 165 08/26/2020    TRIG 117 07/18/2024    HDL 36 (L) 07/18/2024    LDL 52 07/18/2024     Lab Results   Component Value Date    TSH 1.497 04/13/2024     Lab Results   Component Value Date    HGBA1C 7.5 (A) 10/22/2024     Lab Results   Component Value Date    PSA 0.206 07/18/2024    PSA 0.203 05/25/2023    PSA 0.725 03/17/2022     Lab Results   Component Value Date    Iron 67 07/18/2024    Iron Saturation 13 (L) 03/17/2020    Iron Saturation (TSAT) 24 07/18/2024      Lab Results   Component Value Date    GSID35AZ 41.4 07/18/2024               Assessment and  Plan:   Diagnoses and all orders for this visit:    1. Pneumonia due to Pseudomonas species, unspecified laterality, unspecified part of lung (Primary)  Comments:  Improved, completed meropenem IV, will recheck CBC and CXR and have him f/u with pulmonology and cont pulmonary toilet with Spiriva/albuterol/budesonide/DuoNebs  Orders:  -     XR Chest PA & Lateral; Future  -     CBC w AUTO Differential; Future  -     Comprehensive metabolic panel; Future    2. COPD exacerbation  Comments:  Improved, completed meropenem IV, will recheck CBC and CXR and have him f/u with pulmonology and cont pulmonary toilet with Spiriva/albuterol/budesonide/DuoNebs  Orders:  -     XR Chest PA & Lateral; Future  -     CBC w AUTO Differential; Future  -     Comprehensive metabolic panel; Future    3. Essential hypertension  Comments:  stable and well controlled    4. Type 2 diabetes mellitus with hyperglycemia, with long-term current use of insulin  Comments:  f/u with his endocrinologist Neli Paredes but in the meantime increase Levemir to 15 units daily continue sliding scale insulin.  Also on Ozempic and Farxiga    5. Ulcer of left foot, limited to breakdown of skin  Comments:  Continue Betadine swabs and management with wound care    6. Stage 3b chronic kidney disease (CKD)  Comments:  He is to avoid NSAIDs, push fluids 64 oz a day.Will recheck Cr today.He is to f/u with nephrology as directed, rec decreasing Eliquis to 2.5 mg twice daily    7. Paroxysmal atrial fibrillation  Comments:  Recommend decreasing Eliquis to 2.5 mg twice daily given stage IV renal disease.  Will reach out to cardiology.  He is to follow-up with cardiology as directed    8. Chronic diastolic (congestive) heart failure  Comments:  Ongoing lower extremity edema that is stable.  Continue Bumex as directed follow-up with cardiology as directed    9. Gastroesophageal reflux disease without esophagitis  Comments:  Stable and well-controlled on Pepcid    10. Benign  prostatic hyperplasia with urinary retention  Comments:  He self caths and is doing well.  No signs of UTI    11. Iron deficiency anemia, unspecified iron deficiency anemia type  Comments:  Stable on iron.  Will recheck hemoglobin hematocrit today    12. Weakness of both lower extremities  Comments:  With diabetic foot ulcers.  He now has a motorized scooter that is very helpful with his ADLs    13. Insomnia, unspecified type  Comments:  Okay to increase trazodone to 150 mg daily.  New prescription sent    14. Vitamin D deficiency  Comments:  Continue vitamin D supplementation    Other orders  -     traZODone (DESYREL) 100 MG tablet; Take 1.5 tablets by mouth Every Night.  Dispense: 135 tablet; Refill: 1              Objective     Medications:  Current Outpatient Medications   Medication Instructions    arformoterol (BROVANA) 15 mcg, Nebulization, 2 Times Daily - RT    Aspirin Low Dose 81 mg, Oral, Every Morning    atorvastatin (LIPITOR) 20 mg, Oral, Every Night at Bedtime    budesonide (PULMICORT) 0.5 mg, Nebulization, 2 Times Daily    bumetanide (BUMEX) 2 mg, Oral, 2 Times Daily    busPIRone (BUSPAR) 15 mg, Oral, 2 Times Daily    calcium citrate (CALCITRATE) 950 mg, Oral, Every Night at Bedtime    carvedilol (COREG) 25 mg, Oral, Every 12 Hours    Continuous Glucose Sensor (FreeStyle Bethany Sensor System) 1 each, Every 10 Days    docusate sodium (COLACE) 100 mg, Oral, 2 Times Daily    DULoxetine (CYMBALTA) 60 mg, Oral, Daily    Eliquis 5 mg, Oral, Every 12 Hours    famotidine (PEPCID) 40 mg, Oral, Every Morning    Farxiga 5 mg, Oral, Every Morning    ferrous gluconate (FERGON) 324 mg, Oral, Every Morning    finasteride (PROSCAR) 5 mg, Oral, Every Night at Bedtime    folic acid (FOLVITE) 1,000 mcg, Oral, Every Night at Bedtime    Insulin Lispro, 1 Unit Dial, (HUMALOG) 100 UNIT/ML solution pen-injector inject EIGHT units under the skin INTO THE APPROPRIATE AREA THREE TIMES DAILY BEFORE meals PER sliding scale     "ipratropium-albuterol (DUO-NEB) 0.5-2.5 mg/3 ml nebulizer 3 mL, Nebulization, Every 4 Hours PRN    Isopropyl Myristate solution 1 Application    Ketoconazole 1 % shampoo 10 mL, Apply externally, Every 3 Days    Levemir 10 Units, Subcutaneous, Nightly    magnesium oxide (MAG-OX) 400 mg, Oral, Every Night at Bedtime    montelukast (SINGULAIR) 10 mg, Oral, Nightly    Multiple Vitamins-Iron (GNP One Daily Plus Iron) tablet 1 tablet, Oral, Every Morning    NIFEdipine XL (PROCARDIA XL) 30 mg, Oral, Every Morning    O2 (OXYGEN) 2 Liter O2 - CONTINUOUS (route: Oxygen)    Ozempic (0.25 or 0.5 MG/DOSE) 0.5 mg, Subcutaneous, Weekly, Dx: E11.65    tamsulosin (FLOMAX) 0.4 mg, Oral, Every Night at Bedtime    tiotropium (SPIRIVA) 18 MCG per inhalation capsule 1 capsule, Inhalation, Daily - RT    traZODone (DESYREL) 150 mg, Oral, Nightly    vitamin C (ASCORBIC ACID) 500 mg, Oral, 2 Times Daily    Vitamin D3 50 mcg, Oral, Every Morning        Vital Signs:   /53 (BP Location: Right arm, Patient Position: Sitting)   Pulse 79   Temp 97.7 °F (36.5 °C) (Temporal)   Ht 175.3 cm (69.02\")   Wt 111 kg (244 lb) Comment: estimated  SpO2 94%   BMI 36.01 kg/m²             Physical Exam:  Physical Exam  Vitals and nursing note reviewed.   Constitutional:       General: He is not in acute distress.     Appearance: Normal appearance. He is not ill-appearing, toxic-appearing or diaphoretic.   HENT:      Head: Normocephalic and atraumatic.      Right Ear: Tympanic membrane, ear canal and external ear normal.      Left Ear: Tympanic membrane, ear canal and external ear normal.      Nose: No congestion or rhinorrhea.      Mouth/Throat:      Mouth: Mucous membranes are moist.      Pharynx: Oropharynx is clear. No oropharyngeal exudate or posterior oropharyngeal erythema.   Eyes:      Extraocular Movements: Extraocular movements intact.      Conjunctiva/sclera: Conjunctivae normal.      Pupils: Pupils are equal, round, and reactive to light. "   Cardiovascular:      Rate and Rhythm: Normal rate and regular rhythm.      Heart sounds: Normal heart sounds.   Pulmonary:      Effort: Pulmonary effort is normal.      Breath sounds: Normal breath sounds. No wheezing, rhonchi or rales.   Abdominal:      General: Abdomen is flat.      Palpations: Abdomen is soft. There is no mass.      Tenderness: There is no abdominal tenderness.      Hernia: No hernia is present.   Musculoskeletal:      Cervical back: Neck supple. No rigidity.      Right lower leg: No edema.      Left lower leg: No edema.   Lymphadenopathy:      Cervical: No cervical adenopathy.   Skin:     General: Skin is warm and dry.      Comments: His wife took pictures of his diabetic ulcers on his left heel and lateral foot and no signs of infection, healing slowly   Neurological:      General: No focal deficit present.      Mental Status: He is alert and oriented to person, place, and time. Mental status is at baseline.      Motor: Weakness present.      Gait: Gait abnormal.   Psychiatric:         Mood and Affect: Mood normal.         Behavior: Behavior normal.         Thought Content: Thought content normal.         Judgment: Judgment normal.           Review of Systems:  Review of Systems   Constitutional:  Negative for chills and fever.   HENT:  Negative for congestion, ear discharge and sore throat.    Respiratory:  Positive for cough and wheezing. Negative for shortness of breath.    Cardiovascular:  Negative for chest pain.   Gastrointestinal:  Negative for abdominal pain, constipation, diarrhea, nausea, vomiting and GERD.   Genitourinary:  Negative for flank pain.   Skin:  Positive for skin lesions.   Neurological:  Positive for weakness. Negative for dizziness and headache.   Psychiatric/Behavioral:  Positive for sleep disturbance. Negative for suicidal ideas and depressed mood. The patient is not nervous/anxious.               Follow Up   Return in 3 months (on 3/5/2025), or if symptoms worsen  "or fail to improve, for Recheck.    Part of this note may be an electronic transcription/translation of spoken language to printed   text using the Dragon Dictation System.            Medical History:  Medications Discontinued During This Encounter   Medication Reason    traZODone (DESYREL) 50 MG tablet       Past Medical History:    Age-related cognitive decline    Allergic contact dermatitis    Allergies    Anemia    Bedbound    11/2023 \"MY LEG MUSCLES STOPPED WORKING\"    Bronchiectasis with acute lower respiratory infection    Charcot foot due to diabetes mellitus    Chronic diastolic (congestive) heart failure    Chronic kidney disease    Chronic respiratory failure with hypoxia    Closed supracondylar fracture of femur    COPD (chronic obstructive pulmonary disease)    Deep vein thrombosis (DVT) of lower extremity associated with air travel    Dependence on supplemental oxygen    Eczema    Erectile dysfunction    due to organic reasons    Essential (primary) hypertension    Fracture    closed fracture of other tarsal and metatarsal bones    Fracture of proximal humerus    GERD without esophagitis    High risk medication use    Hypercholesteremia    Hypomagnesemia    Infected stasis ulcer of left lower extremity    Insomnia    Low back pain    Major depressive disorder    Morbid (severe) obesity due to excess calories    MRSA pneumonia    Muscle weakness    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 without evidence of necrosis    Obstructive sleep apnea (adult) (pediatric)    On home O2    REPORTS WEARING 2L/NC AAT    Other forms of dyspnea    Other long term (current) drug therapy    Other specified noninfective gastroenteritis and colitis    Other spondylosis, lumbar region    Pain in both knees    Paroxysmal atrial fibrillation    Peripheral neuropathy    attributed to type 2 diabetes    Pneumonia, unspecified organism    Polyneuropathy    Rash and other nonspecific " skin eruption    Syncope and collapse    Tachycardia    Tinnitus    Type 1 diabetes mellitus with diabetic chronic kidney disease    Type 2 diabetes mellitus    Unspecified fall, initial encounter    Urinary retention     Past Surgical History:    CARDIAC CATHETERIZATION    Procedure: Carbon dioxide aortogram with left leg angiogram, possible angioplasty or stenting;  Surgeon: Moshe Willson MD;  Location: [facility] CATH INVASIVE LOCATION;  Service: Vascular;  Laterality: Left;    CHOLECYSTECTOMY    CYSTOSCOPY    FEMUR SURGERY    Shravan placed    KNEE SURGERY    OTHER SURGICAL HISTORY    venous port, REMOVED    PORTACATH PLACEMENT    TIBIAL PLATEAU OPEN REDUCTION INTERNAL FIXATION    Procedure: TIBIAL PLATEAU OPEN REDUCTION INTERNAL FIXATION;  Surgeon: Hugo Kline MD;  Location[facility] MAIN [facility];  Service: Orthopedics;  Laterality: Left;    TONSILLECTOMY AND ADENOIDECTOMY      Family History   Problem Relation Age of Onset    Coronary artery disease Mother     Hypertension Mother     Diabetes type II Mother     Asthma Father     Diabetes type II Sister     Cancer Sister      Social History     Tobacco Use    Smoking status: Former     Current packs/day: 0.00     Average packs/day: 1 pack/day for 12.0 years (12.0 ttl pk-yrs)     Types: Cigarettes     Start date:      Quit date:      Years since quittin.9     Passive exposure: Past    Smokeless tobacco: Never   Substance Use Topics    Alcohol use: Not Currently       Health Maintenance Due   Topic Date Due    DIABETIC EYE EXAM  2024        Immunization History   Administered Date(s) Administered    COVID-19 (PFIZER) 12YRS+ (COMIRNATY) 10/22/2024    Flu Vaccine Quad PF >36MO 10/18/2016, 10/16/2017, 2019    Flu Vaccine Split Quad 2019    Fluzone  >6mos 2013    Fluzone (or Fluarix & Flulaval for VFC) >6mos 10/18/2016, 10/16/2017, 2019, 10/19/2022, 2023    Fluzone High-Dose 65+YRS 10/22/2024    Influenza Injectable  Mdck Pf Quad 10/19/2022    Influenza, Unspecified 09/21/2020    PEDS-Pneumococcal Conjugate (PCV7) 11/14/2023    Pneumococcal Conjugate 20-Valent (PCV20) 11/14/2023    Pneumococcal Polysaccharide (PPSV23) 11/20/1997    Shingrix 07/11/2024, 10/22/2024    Tdap 09/18/2018    influenza Split 11/04/2019       Allergies   Allergen Reactions    Benadryl [Diphenhydramine] Itching    Proventil [Albuterol] Other (See Comments)     Mouth sores

## 2024-12-06 ENCOUNTER — PATIENT OUTREACH (OUTPATIENT)
Dept: CASE MANAGEMENT | Facility: OTHER | Age: 59
End: 2024-12-06
Payer: COMMERCIAL

## 2024-12-06 DIAGNOSIS — R79.89 ELEVATED LFTS: Primary | ICD-10-CM

## 2024-12-06 DIAGNOSIS — N18.31 STAGE 3A CHRONIC KIDNEY DISEASE: Primary | ICD-10-CM

## 2024-12-06 LAB
ALBUMIN SERPL-MCNC: 3.4 G/DL (ref 3.5–5.2)
ALBUMIN/GLOB SERPL: 0.8 G/DL
ALP SERPL-CCNC: 224 U/L (ref 39–117)
ALT SERPL W P-5'-P-CCNC: 62 U/L (ref 1–41)
ANION GAP SERPL CALCULATED.3IONS-SCNC: 11.9 MMOL/L (ref 5–15)
AST SERPL-CCNC: 42 U/L (ref 1–40)
BILIRUB SERPL-MCNC: <0.2 MG/DL (ref 0–1.2)
BUN SERPL-MCNC: 51 MG/DL (ref 6–20)
BUN/CREAT SERPL: 23.6 (ref 7–25)
CALCIUM SPEC-SCNC: 8.7 MG/DL (ref 8.6–10.5)
CHLORIDE SERPL-SCNC: 92 MMOL/L (ref 98–107)
CO2 SERPL-SCNC: 30.1 MMOL/L (ref 22–29)
CREAT SERPL-MCNC: 2.16 MG/DL (ref 0.76–1.27)
EGFRCR SERPLBLD CKD-EPI 2021: 34.4 ML/MIN/1.73
GLOBULIN UR ELPH-MCNC: 4.1 GM/DL
GLUCOSE SERPL-MCNC: 326 MG/DL (ref 65–99)
POTASSIUM SERPL-SCNC: 4.4 MMOL/L (ref 3.5–5.2)
PROT SERPL-MCNC: 7.5 G/DL (ref 6–8.5)
SODIUM SERPL-SCNC: 134 MMOL/L (ref 136–145)

## 2024-12-06 NOTE — PLAN OF CARE
Problem: Chronic Kidney  Goal: Optimal Care Coordination of a Patient Experiencing Chronic Kidney Disease  Intervention: Alleviate Barriers to Chronic Kidney Disease Treatment  Flowsheets (Taken 12/6/2024 0562)  Alleviate Barriers to Chronic Kidney Disease Treatment: (Reached out to nephrology office to review labs and any interventions needed.  Sent abnormal labs to oncall provider and pcp.)   medication side effects managed   barriers to treatment adherence identified   activity or exercise based on tolerance encouraged

## 2024-12-06 NOTE — OUTREACH NOTE
AMBULATORY CASE MANAGEMENT NOTE    Names and Relationships of Patient/Support Persons:  -     Gómez's labs came back today and there is a decrease in his renal function.  Called nephrology and spoke with Arleth.  Asked if she would send provider a message to review and any guidance.  He did just complete IV Meropenem.  Also send results to oncall provider and pcp.       Rescheduled cardiology appointment.  Spent some time together with both Elizabeth and Gómez yesterday during the office visit.  He just received his motorized scooter/wheelchair and is liking it.      Tara GOMEZ  Ambulatory Case Management    12/6/2024, 11:49 EST

## 2024-12-10 NOTE — PROGRESS NOTES
Primary Care Provider  Kimmy Riley MD   Referring Provider  No ref. provider found    Patient Complaint  Hospital Follow Up Visit and Shortness of Breath      Subjective       History of Presenting Illness  Preston Wallis is a pleasant 59 y.o. male who presents to [facility] with history of severe COPD, bronchiectasis, chronic hypercapnic hypoxic respiratory failure, obesity hypoventilation syndrome, MILADY not adherent to BiPAP and pseudomonas pneumonia.  He last saw Dr. Rivera on 10/30/2024. His treatment regimen includes tobramycin once a month, Brovana and Pulmicort twice daily, Spiriva, and albuterol as needed.  His current oxygen level is 2 L continuous flow via nasal cannula that he is using and benefiting from.  Patient also has chronic kidney disease stage IV and is under the care of nephrology.    Patient is here after hospital follow-up at [facility] 11/19/2024-11/23/2024 for pneumonia due to Pseudomonas.  Hospital course includes the following:The patient continued to improve during the hospitalization and at time of discharge he denied any dyspnea and is back to his baseline, 2 L of oxygen through nasal cannula  The other chronic conditions remained stable and there was no changes in the preadmission medications  He had a sputum that he was sent for PCR analysis which showed Therevac.,  Klebsiella, Pseudomonas and Serratia, unfortunate the patient has also bronchiectasis with making prone to significant multi organism infections  Close the gene CTX-M  gene was detected switched from cefepime to meropenem  The culture shows Pseudomonas but the final sensitivities were not able to perform in the hospital and was  send it for further sensitivities and analysis therefore the results will not be back until 2 weeks because last 2 admissions we had a similar problems with the cultures and it took 2 weeks to get back the results, the last cultures  show multiresistant Pseudomonas with the only viable option cefepime therefore a midline was placed and he will finish treatment at home of 10-day course of treatment. Strongly recommended to follow-up with his pulmonologist    Patient here with spouse.  He continues on O2 at 2L continuous flow.  His DME company is [employer].  Patient states he is doing very well since he got out of the hospital.  He does continue on Brovana, Spiriva, Pulmicort and duo nebulizer.  Patient states he is up-to-date with his flu and pneumonia vaccine.  Patient states his breathing is about the same he has good days and bad days.  At present time patient denies coughing, wheezing, headaches, chest pain, weight loss or hemoptysis. Patient denies fevers, chills and night sweats. Preston Wallis is able to perform ADLs.  Patient states he has not been using his duo nebulizer as frequently as he should and has noticed that his sats will drop down sometimes.  He does have incentive spirometer and flutter valve but he has not been using these either.      I have personally reviewed the review of systems, past family, social, medical and surgical histories; and agree with their findings.      Review of Systems   Constitutional: Negative.    HENT: Negative.     Respiratory:  Positive for shortness of breath.    Cardiovascular: Negative.    Musculoskeletal: Negative.    Neurological: Negative.    Psychiatric/Behavioral: Negative.           Family History   Problem Relation Age of Onset    Coronary artery disease Mother     Hypertension Mother     Diabetes type II Mother     Asthma Father     Diabetes type II Sister     Cancer Sister         Social History     Socioeconomic History    Marital status:    Tobacco Use    Smoking status: Former     Current packs/day: 0.00     Average packs/day: 1 pack/day for 12.0 years (12.0 ttl pk-yrs)     Types: Cigarettes     Start date:      Quit date:      Years since quittin.9     Passive  "exposure: Past    Smokeless tobacco: Never   Vaping Use    Vaping status: Never Used   Substance and Sexual Activity    Alcohol use: Not Currently    Drug use: Never    Sexual activity: Defer        Past Medical History:   Diagnosis Date    Age-related cognitive decline     Allergic contact dermatitis     Allergies     Anemia     Bedbound     11/2023 \"MY LEG MUSCLES STOPPED WORKING\"    Bronchiectasis with acute lower respiratory infection     Charcot foot due to diabetes mellitus 09/10/2013    Chronic diastolic (congestive) heart failure     Chronic kidney disease     Chronic respiratory failure with hypoxia     Closed supracondylar fracture of femur 01/12/2022    COPD (chronic obstructive pulmonary disease)     Deep vein thrombosis (DVT) of lower extremity associated with air travel 01/13/2023    Dependence on supplemental oxygen     Eczema     Erectile dysfunction     due to organic reasons    Essential (primary) hypertension     Fracture     closed fracture of other tarsal and metatarsal bones    Fracture of proximal humerus 01/13/2023    GERD without esophagitis     High risk medication use     Hypercholesteremia     Hypomagnesemia     Infected stasis ulcer of left lower extremity 01/13/2023    Insomnia     Low back pain     Major depressive disorder     Morbid (severe) obesity due to excess calories     MRSA pneumonia     Muscle weakness     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 without evidence of necrosis     Obstructive sleep apnea (adult) (pediatric)     On home O2     REPORTS WEARING 2L/NC AAT    Other forms of dyspnea     Other long term (current) drug therapy     Other specified noninfective gastroenteritis and colitis     Other spondylosis, lumbar region     Pain in both knees     Paroxysmal atrial fibrillation     Peripheral neuropathy     attributed to type 2 diabetes    Pneumonia, unspecified organism     Polyneuropathy     Rash and other nonspecific skin " eruption     Syncope and collapse     Tachycardia     Tinnitus 01/13/2023    Type 1 diabetes mellitus with diabetic chronic kidney disease     Type 2 diabetes mellitus     Unspecified fall, initial encounter     Urinary retention         Immunization History   Administered Date(s) Administered    COVID-19 (PFIZER) 12YRS+ (COMIRNATY) 10/22/2024    Flu Vaccine Quad PF >36MO 10/18/2016, 10/16/2017, 11/04/2019    Flu Vaccine Split Quad 11/04/2019    Fluzone  >6mos 09/19/2013    Fluzone (or Fluarix & Flulaval for VFC) >6mos 09/19/2013, 10/18/2016, 10/16/2017, 11/04/2019, 10/19/2022, 11/14/2023    Fluzone High-Dose 65+YRS 10/22/2024    Influenza Injectable Mdck Pf Quad 10/19/2022    Influenza, Unspecified 10/18/2016, 10/16/2017, 11/04/2019, 09/21/2020    PEDS-Pneumococcal Conjugate (PCV7) 11/14/2023    Pneumococcal Conjugate 20-Valent (PCV20) 11/14/2023    Pneumococcal Polysaccharide (PPSV23) 11/20/1997    Shingrix 07/11/2024, 10/22/2024    Tdap 09/18/2018    influenza Split 11/04/2019       Allergies   Allergen Reactions    Benadryl [Diphenhydramine] Itching    Proventil [Albuterol] Other (See Comments)     Mouth sores            Current Outpatient Medications:     arformoterol (BROVANA) 15 MCG/2ML nebulizer solution, Take 2 mL by nebulization 2 (Two) Times a Day., Disp: 360 mL, Rfl: 3    Aspirin Low Dose 81 MG EC tablet, TAKE 1 TABLET BY MOUTH EVERY MORNING, Disp: 30 tablet, Rfl: 3    atorvastatin (LIPITOR) 20 MG tablet, TAKE 1 TABLET BY MOUTH EVERY NIGHT AT BEDTIME, Disp: 30 tablet, Rfl: 3    B-D UF III MINI PEN NEEDLES 31G X 5 MM misc, USE FOUR TIMES DAILY BEFORE MEALS AND AT BEDTIME, Disp: , Rfl:     budesonide (Pulmicort) 0.5 MG/2ML nebulizer solution, Take 2 mL by nebulization 2 (Two) Times a Day., Disp: 360 mL, Rfl: 3    bumetanide (BUMEX) 2 MG tablet, TAKE 1 TABLET BY MOUTH TWICE DAILY, Disp: 60 tablet, Rfl: 3    busPIRone (BUSPAR) 15 MG tablet, TAKE 1 TABLET BY MOUTH TWICE DAILY, Disp: 60 tablet, Rfl: 3     calcium citrate (CALCITRATE) 950 (200 Ca) MG tablet, TAKE 1 TABLET BY MOUTH EVERY NIGHT AT BEDTIME, Disp: 30 tablet, Rfl: 3    carvedilol (COREG) 25 MG tablet, TAKE 1 TABLET BY MOUTH EVERY TWELVE HOURS, Disp: 60 tablet, Rfl: 3    Cholecalciferol (Vitamin D3) 50 MCG (2000 UT) tablet, TAKE 1 TABLET BY MOUTH EVERY MORNING, Disp: 30 tablet, Rfl: 3    Continuous Glucose Sensor (FreeStyle Bethany 3 Plus Sensor), Use 2 each Every 30 (Thirty) Days. Apply as directed every 15 days, Disp: 2 each, Rfl: 5    docusate sodium (COLACE) 100 MG capsule, TAKE 1 CAPSULE BY MOUTH TWICE DAILY, Disp: 60 capsule, Rfl: 3    DULoxetine (Cymbalta) 60 MG capsule, Take 1 capsule by mouth Daily., Disp: 90 capsule, Rfl: 1    Eliquis 5 MG tablet tablet, TAKE 1 TABLET BY MOUTH EVERY TWELVE HOURS, Disp: 60 tablet, Rfl: 3    famotidine (PEPCID) 40 MG tablet, TAKE 1 TABLET BY MOUTH EVERY MORNING, Disp: 30 tablet, Rfl: 3    Farxiga 5 MG tablet tablet, TAKE 1 TABLET BY MOUTH EVERY MORNING, Disp: 30 tablet, Rfl: 3    ferrous gluconate (FERGON) 324 MG tablet, TAKE 1 TABLET BY MOUTH EVERY MORNING, Disp: 30 tablet, Rfl: 3    finasteride (PROSCAR) 5 MG tablet, TAKE 1 TABLET BY MOUTH EVERY NIGHT AT BEDTIME, Disp: 30 tablet, Rfl: 3    insulin detemir (Levemir) 100 UNIT/ML injection, Inject 15 Units under the skin into the appropriate area as directed Every Night for 180 days., Disp: 15 mL, Rfl: 1    Insulin Lispro, 1 Unit Dial, (HUMALOG) 100 UNIT/ML solution pen-injector, inject EIGHT units under the skin INTO THE APPROPRIATE AREA THREE TIMES DAILY BEFORE meals PER sliding scale, Disp: 15 mL, Rfl: 0    ipratropium-albuterol (DUO-NEB) 0.5-2.5 mg/3 ml nebulizer, Take 3 mL by nebulization Every 4 (Four) Hours As Needed for Wheezing or Shortness of Air., Disp: 360 mL, Rfl: 5    Isopropyl Myristate solution, Apply 1 Application topically to the appropriate area as directed., Disp: , Rfl:     Ketoconazole 1 % shampoo, Apply 10 mL topically Every 3 (Three) Days.,  "Disp: 200 mL, Rfl: 0    magnesium oxide (MAG-OX) 400 MG tablet, TAKE 1 TABLET BY MOUTH EVERY NIGHT AT BEDTIME, Disp: 30 tablet, Rfl: 3    montelukast (SINGULAIR) 10 MG tablet, Take 1 tablet by mouth Every Night., Disp: 30 tablet, Rfl: 5    Multiple Vitamins-Iron (GNP One Daily Plus Iron) tablet, TAKE 1 TABLET BY MOUTH EVERY MORNING, Disp: 30 each, Rfl: 3    NIFEdipine XL (PROCARDIA XL) 30 MG 24 hr tablet, TAKE 1 TABLET BY MOUTH EVERY MORNING, Disp: 30 tablet, Rfl: 3    O2 (OXYGEN), 2 Liter O2 - CONTINUOUS (route: Oxygen), Disp: , Rfl:     Semaglutide, 1 MG/DOSE, (Ozempic, 1 MG/DOSE,) 4 MG/3ML solution pen-injector, Inject 1 mg under the skin into the appropriate area as directed 1 (One) Time Per Week., Disp: 3 mL, Rfl: 5    tamsulosin (FLOMAX) 0.4 MG capsule 24 hr capsule, TAKE 1 CAPSULE BY MOUTH EVERY NIGHT AT BEDTIME, Disp: 30 capsule, Rfl: 3    tiotropium (SPIRIVA) 18 MCG per inhalation capsule, Place 1 capsule into inhaler and inhale Daily., Disp: 30 capsule, Rfl: 5    traZODone (DESYREL) 100 MG tablet, Take 1.5 tablets by mouth Every Night., Disp: 135 tablet, Rfl: 1    vitamin C (ASCORBIC ACID) 500 MG tablet, TAKE 1 TABLET BY MOUTH TWICE DAILY, Disp: 60 tablet, Rfl: 3    folic acid (FOLVITE) 1 MG tablet, TAKE 1 TABLET BY MOUTH EVERY NIGHT AT BEDTIME, Disp: 30 tablet, Rfl: 3         Vital Signs   /58 (BP Location: Left arm, Patient Position: Sitting, Cuff Size: Large Adult)   Pulse 73   Temp 96.8 °F (36 °C)   Resp 16   Ht 175.3 cm (69.02\")   Wt 109 kg (241 lb)   SpO2 95% Comment: 2 liters  BMI 35.57 kg/m²       Objective     Physical Exam  Vitals reviewed.   Constitutional:       General: He is not in acute distress.     Appearance: Normal appearance. He is obese. He is not ill-appearing.   HENT:      Head: Normocephalic and atraumatic.      Nose: Nose normal.      Mouth/Throat:      Mouth: Mucous membranes are moist.      Pharynx: Oropharynx is clear.   Eyes:      Extraocular Movements: Extraocular " movements intact.      Conjunctiva/sclera: Conjunctivae normal.      Pupils: Pupils are equal, round, and reactive to light.   Cardiovascular:      Rate and Rhythm: Normal rate and regular rhythm.      Pulses: Normal pulses.      Heart sounds: Normal heart sounds.   Pulmonary:      Effort: Pulmonary effort is normal. No respiratory distress.      Breath sounds: Normal breath sounds. No stridor. No wheezing, rhonchi or rales.   Abdominal:      General: Bowel sounds are normal.   Musculoskeletal:         General: Normal range of motion.      Cervical back: Normal range of motion and neck supple.   Skin:     General: Skin is warm and dry.   Neurological:      Mental Status: He is alert and oriented to person, place, and time.   Psychiatric:         Behavior: Behavior normal.         Results Review  I have personally reviewed the prior office notes, hospital records, labs, and diagnostics.  XR Chest PA & Lateral [[id number]] (Order [id number])  Order  Status: Final result     Study Notes     Derrick Fitzgerald on 12/5/2024  1:03 PM EST   PNEUMONIA F/U-ASYMPTOMATIC TODAY     Appointment Information    PACS Images     Radiology Images  Study Result    Narrative & Impression   XR CHEST PA AND LATERAL     Date of Exam: 12/5/2024 1:02 PM EST     Indication: pneumonia     Comparison: 4/29/2024. 10/22/2024.     Findings:  Stable cardiomegaly. Stable right IJ Port-A-Cath. Stable abandoned left subclavian Port-A-Cath. Stable bronchiectasis and chronic appearing infiltrates in the mid and upper lung fields. No evidence of pneumothorax or pleural effusion.     IMPRESSION:  Impression:  Stable cardiomegaly. Stable bronchiectasis and chronic appearing infiltrates in the mid and upper lung fields.        Electronically Signed: Man Luu MD    12/6/2024 4:54 PM EST    Workstation ID: [id number]   10/22/24    CT Chest Without Contrast Diagnostic [[id number]] (Order [id number])  Order  Status: Final result     Study Notes     Nora Treviño on  10/22/2024 10:48 AM EDT   Previous online     Appointment Information    Display Notes    IN / ST 10.18.24 prev online                  PACS Images     Radiology Images      Study Result    Narrative & Impression   CT CHEST WO CONTRAST DIAGNOSTIC     Date of Exam: 10/22/2024 10:48 AM EDT     Indication: pneumonia, abnormal chest ct scan.     Comparison: Chest CT 7/22/2024     Technique: Axial CT images were obtained of the chest without contrast administration.  Reconstructed coronal and sagittal images were also obtained. Automated exposure control and iterative construction methods were used.        Findings:  Thyroid unremarkable. Right chest port catheter tip within upper SVC. Abandoned subclavian catheter in stable position. Aortic atherosclerotic disease without aneurysm. Normal caliber main pulmonary artery. Calcified coronary atherosclerotic disease.   Mild stable cardiomegaly. Hypodense cardiac blood pooling, nonspecific and can be seen with anemia. Small sliding hiatal hernia.     Stable mild mediastinal adenopathy, presumed reactive. No enlarging thoracic or axillary adenopathy. Cholecystectomy. No acute findings in the partially imaged upper abdomen. Gynecomastia. No acute chest wall finding. Chronic left rib deformity. No acute   displaced fracture or aggressive lesion. Degenerative related changes throughout the spine with findings suggesting possible diffuse idiopathic skeletal hyperostosis. No visualized displaced fracture or aggressive bone lesion.     Trachea patent. Multifocal upper lobe predominant bronchiectatic change similar to prior exam favor combination of varicose and cystic type bronchiectasis. Bandlike atelectasis versus scar in the inferior aspect of right upper lobe stable from prior.   Mosaic attenuation noted suggesting component of air trapping. Mild tree-in-bud nodularity in the right upper, right lower and left lower lobes slightly increasing in the right upper lobe example image  10 series 201 otherwise stable.. No discrete lobar   consolidation. Trace right pleural effusion less conspicuous from prior. Mildly thickened adjacent pleura similar to prior no pneumothorax or overtly suspicious pulmonary nodule.     IMPRESSION:  Impression:  1. Mild multifocal tree-in-bud nodular opacities slightly increasing in the right upper lobe otherwise stable. Stable multifocal upper lobe predominant bronchiectasis. Findings likely reflect waxing and waning chronic atypical infection which may include   nontuberculous mycobacteria, aspiration, among other etiologies.  2. Less conspicuous trace right pleural fluid with adjacent pleural thickening.  3. Stable mild mediastinal adenopathy, presumed reactive.  4. Other ancillary findings similar to previous exam detailed above.           Electronically Signed: Lino Stevenson MD    10/23/2024 4:37 PM EDT    Workstation ID: [id number]           Assessment         Patient Active Problem List   Diagnosis    Chronic cough    Pneumonia due to COVID-19 virus    Polyneuropathy    Paroxysmal atrial fibrillation    Obstructive sleep apnea    MRSA pneumonia    Low back pain    Chronic diastolic (congestive) heart failure    Allergies    COPD exacerbation    Chronic anticoagulation    Benign prostatic hyperplasia    Difficulty using continuous positive airway pressure (CPAP) device    Stage 3a chronic kidney disease    Iron deficiency anemia secondary to inadequate dietary iron intake    Vitamin D deficiency    Class 3 severe obesity with serious comorbidity in adult    Lower extremity edema    Elevated alkaline phosphatase level    Venous insufficiency (chronic) (peripheral)    Tobacco abuse, in remission    History of Pseudomonas pneumonia    Chronic dyspnea    Gastroesophageal reflux disease    Bronchiectasis without complication    ERIC (acute kidney injury)    Altered mental status    Hyperlipidemia    Luetscher's syndrome    Neurogenic bladder    Class 1 obesity     Pneumonia due to Pseudomonas species    Seizures    Primary osteoarthritis of left knee    Other constipation    Chronic obstructive pulmonary disease    Type 2 diabetes mellitus with hyperglycemia, with long-term current use of insulin    Essential hypertension    Stage 3b chronic kidney disease    Annual physical exam    Long-term use of high-risk medication    Personal history of PE (pulmonary embolism)    Encounter for aftercare for healing closed traumatic fracture of left femur    Chronic pain of left knee    Primary osteoarthritis of right knee    Sepsis    Bronchiectasis with acute exacerbation    Bacterial pneumonia    Anemia    Closed fracture of left tibial plateau    Septic joint    Septic arthritis    Skin ulcer of left heel, limited to breakdown of skin    Ulcer of left foot, limited to breakdown of skin    Left foot pain    Wheezing    Acute on chronic respiratory failure with hypercapnia    Chronic respiratory failure with hypoxia and hypercapnia    Acute hypoxic on chronic hypercapnic respiratory failure    Impaired cognition    Atherosclerosis of native arteries of the extremities with ulceration        Plan     Diagnoses and all orders for this visit:    1. Pneumonia due to Pseudomonas species, unspecified laterality, unspecified part of lung (Primary)    2. Acute hypoxic on chronic hypercapnic respiratory failure    3. COPD exacerbation    4. Bronchiectasis with acute exacerbation    5. Tobacco abuse, in remission    6. CKD (chronic kidney disease), stage IV       7.  Absolutely encourage patient to be proactive with using his duo nebulizer every 6 hours as well as using the incentive spirometer and flutter valve multiple times a day to help with airway clearance and bronchiectasis.  Patient to continue with his Brovana Pulmicort twice daily and Spiriva once a day.  Patient to continue with his O2 at 2 L as he is benefiting from supplemental oxygen.  Patient will follow back up in the [facility]  office in 2 to 3 months or sooner if needed        Smoking status:  reports that he quit smoking about 31 years ago. His smoking use included cigarettes. He started smoking about 43 years ago. He has a 12 pack-year smoking history. He has been exposed to tobacco smoke. He has never used smokeless tobacco.    Vaccination status: Reviewed  Immunization History   Administered Date(s) Administered    COVID-19 (PFIZER) 12YRS+ (COMIRNATY) 10/22/2024    Flu Vaccine Quad PF >36MO 10/18/2016, 10/16/2017, 11/04/2019    Flu Vaccine Split Quad 11/04/2019    Fluzone  >6mos 09/19/2013    Fluzone (or Fluarix & Flulaval for VFC) >6mos 09/19/2013, 10/18/2016, 10/16/2017, 11/04/2019, 10/19/2022, 11/14/2023    Fluzone High-Dose 65+YRS 10/22/2024    Influenza Injectable Mdck Pf Quad 10/19/2022    Influenza, Unspecified 10/18/2016, 10/16/2017, 11/04/2019, 09/21/2020    PEDS-Pneumococcal Conjugate (PCV7) 11/14/2023    Pneumococcal Conjugate 20-Valent (PCV20) 11/14/2023    Pneumococcal Polysaccharide (PPSV23) 11/20/1997    Shingrix 07/11/2024, 10/22/2024    Tdap 09/18/2018    influenza Split 11/04/2019        Medications personally reviewed    Follow Up  Return in about 3 months (around 3/12/2025) for with Betzaida Nelson NP or Dr. Caity Coello.    Patient was given instructions and counseling regarding his condition or for health maintenance advice. Please see specific information pulled into the AVS if appropriate.     I spent 15 minutes caring for Preston Wallis on this date of service. This time includes time spent by me in the following activities:preparing for the visit, reviewing tests, obtaining and/or reviewing a separately obtained history, performing a medically appropriate examination and/or evaluation, counseling and educating the patient/family/caregiver, ordering medications, tests, or procedures, documenting information in the medical record, independently interpreting results and communicating that information with  the patient/family/caregiver and answered questions family members, discuss medications.

## 2024-12-11 ENCOUNTER — OFFICE VISIT (OUTPATIENT)
Dept: DIABETES SERVICES | Facility: CLINIC | Age: 59
End: 2024-12-11
Payer: COMMERCIAL

## 2024-12-11 VITALS
BODY MASS INDEX: 35.7 KG/M2 | HEART RATE: 48 BPM | WEIGHT: 241 LBS | SYSTOLIC BLOOD PRESSURE: 138 MMHG | OXYGEN SATURATION: 88 % | DIASTOLIC BLOOD PRESSURE: 88 MMHG | HEIGHT: 69 IN

## 2024-12-11 DIAGNOSIS — E11.65 UNCONTROLLED TYPE 2 DIABETES MELLITUS WITH HYPERGLYCEMIA: Primary | ICD-10-CM

## 2024-12-11 DIAGNOSIS — Z79.4 TYPE 2 DIABETES MELLITUS WITH BOTH EYES AFFECTED BY MILD NONPROLIFERATIVE RETINOPATHY WITHOUT MACULAR EDEMA, WITH LONG-TERM CURRENT USE OF INSULIN: ICD-10-CM

## 2024-12-11 DIAGNOSIS — Z79.4 TYPE 2 DIABETES MELLITUS WITH DIABETIC NEUROPATHY, WITH LONG-TERM CURRENT USE OF INSULIN: ICD-10-CM

## 2024-12-11 DIAGNOSIS — R80.1 PERSISTENT PROTEINURIA: ICD-10-CM

## 2024-12-11 DIAGNOSIS — E11.22 TYPE 2 DIABETES MELLITUS WITH STAGE 3B CHRONIC KIDNEY DISEASE, WITH LONG-TERM CURRENT USE OF INSULIN: ICD-10-CM

## 2024-12-11 DIAGNOSIS — E11.3293 TYPE 2 DIABETES MELLITUS WITH BOTH EYES AFFECTED BY MILD NONPROLIFERATIVE RETINOPATHY WITHOUT MACULAR EDEMA, WITH LONG-TERM CURRENT USE OF INSULIN: ICD-10-CM

## 2024-12-11 DIAGNOSIS — E66.01 SEVERE OBESITY (BMI 35.0-39.9) WITH COMORBIDITY: ICD-10-CM

## 2024-12-11 DIAGNOSIS — N18.32 TYPE 2 DIABETES MELLITUS WITH STAGE 3B CHRONIC KIDNEY DISEASE, WITH LONG-TERM CURRENT USE OF INSULIN: ICD-10-CM

## 2024-12-11 DIAGNOSIS — Z79.4 TYPE 2 DIABETES MELLITUS WITH STAGE 3B CHRONIC KIDNEY DISEASE, WITH LONG-TERM CURRENT USE OF INSULIN: ICD-10-CM

## 2024-12-11 DIAGNOSIS — E11.40 TYPE 2 DIABETES MELLITUS WITH DIABETIC NEUROPATHY, WITH LONG-TERM CURRENT USE OF INSULIN: ICD-10-CM

## 2024-12-11 LAB
EXPIRATION DATE: ABNORMAL
HBA1C MFR BLD: 8.8 % (ref 4.5–5.7)
Lab: ABNORMAL

## 2024-12-11 RX ORDER — HYDROCHLOROTHIAZIDE 12.5 MG/1
2 CAPSULE ORAL
Qty: 2 EACH | Refills: 5 | Status: SHIPPED | OUTPATIENT
Start: 2024-12-11

## 2024-12-11 RX ORDER — KETOROLAC TROMETHAMINE 30 MG/ML
1 INJECTION, SOLUTION INTRAMUSCULAR; INTRAVENOUS ONCE
Qty: 1 EACH | Refills: 0 | Status: SHIPPED | OUTPATIENT
Start: 2024-12-11 | End: 2024-12-11

## 2024-12-11 RX ORDER — INSULIN DETEMIR 100 [IU]/ML
15 INJECTION, SOLUTION SUBCUTANEOUS NIGHTLY
Qty: 15 ML | Refills: 1 | Status: SHIPPED | OUTPATIENT
Start: 2024-12-11 | End: 2025-06-09

## 2024-12-11 RX ORDER — SEMAGLUTIDE 1.34 MG/ML
1 INJECTION, SOLUTION SUBCUTANEOUS WEEKLY
Qty: 3 ML | Refills: 5 | Status: SHIPPED | OUTPATIENT
Start: 2024-12-11

## 2024-12-11 RX ORDER — FLURBIPROFEN SODIUM 0.3 MG/ML
SOLUTION/ DROPS OPHTHALMIC
COMMUNITY
Start: 2024-12-03

## 2024-12-11 NOTE — PROGRESS NOTES
Chief Complaint  Follow-up (Uncontrolled type 2 diabetes)    Referred By: Kimmy Riley MD    Subjective          Patient or patient representative verbalized consent for the use of Ambient Listening during the visit with  CON Ashley for chart documentation. 12/31/2024  21:01 JAQUI Wallis presents to [facility] for diabetes medication management    History of Present Illness    Visit type:  follow-up  Diabetes type:  Type 2  Current diabetes status/concerns/issues:     History of Present Illness  The patient presents for evaluation of diabetes.    Was last seen in November 2023.  He has been monitoring his blood glucose levels at home, with readings typically ranging from 182 to 203. He reports a significant spike in his blood sugar levels during the Thanksgiving period. He checks his blood glucose levels once daily, usually at bedtime. He is not currently utilizing a continuous glucose monitor. His dietary habits include consuming one meal per day. He has a sufficient supply of Ozempic at home, with three pens currently available. He recently refilled his supply of needles for his Levemir pens. His current medication regimen includes Ozempic, administered as a weekly injection, Farxiga 5 mg taken once daily, and Levemir 15 units administered once daily at night. He has previously used Bydureon but has since transitioned to Ozempic without any adverse effects. He also uses Humalog insulin on a sliding scale basis, particularly when his blood glucose levels are elevated. He does not take the meal dose of Humalog insulin. He was previously using a Bethany reader for his continuous glucose monitoring.    Supplemental Information  He has a wound on his left heel and a smaller wound under the pinky toe on his foot. He goes to the wound clinic for that. In the past, he had a wound on his right foot, but that was all better. He was last seen in November 2023. He was  "recently hospitalized for pneumonia and sepsis.      Current Diabetes symptoms:    Polyuria: No   Polydipsia: No   Polyphagia: No   Blurred vision: No   Excessive fatigue: No  Known Diabetes complications:  Neuropathy: Numbness, Tingling, Shooting Pain and Temperature variation (hot or cold sensations); Location: Feet  Renal: Stage IIIb moderate (GFR = 30-44 mL/min) and Proteinuria  Eyes: Mild Nonproliferative Retinopathy and Without Macular Edema; Location: Bilateral  Amputation/Wounds: He has a pressure injury of the left heel and then a small pressure injury of the left foot  GI: None  Cardiovascular: Hypertension, Hyperlipidemia and CHF  ED: None  Other: Neurogenic bladder  Hypoglycemia:  None reported at this time  Hypoglycemia Symptoms:  No hypoglycemia at this time  Current diabetes treatment: Ozempic 0.5 mg once weekly, Farxiga 5 mg once a day, Levemir 15 units once a day and Humalog sliding scale as follows:  IF BLOOD SUGAR < 160= 0 units, 161-220= 2 units, 221-280= 4 units, 281-340 = 6 units, 341-400 = 8 units    Blood glucose device:  Bethany CGM; meter  Blood glucose monitoring frequency:  Continuous per CGM; meter  Blood glucose range/average:   He reports glucose levels during the 180-200+ range  Glucose Source: Patient reported  Diet:  Limits high carb/sweet foods, Avoids sugary drinks  Activity/Exercise:  None    Past Medical History:   Diagnosis Date    Age-related cognitive decline     Allergic contact dermatitis     Allergies     Anemia     Bedbound     11/2023 \"MY LEG MUSCLES STOPPED WORKING\"    Bronchiectasis with acute lower respiratory infection     Charcot foot due to diabetes mellitus 09/10/2013    Chronic diastolic (congestive) heart failure     Chronic kidney disease     Chronic respiratory failure with hypoxia     Closed supracondylar fracture of femur 01/12/2022    COPD (chronic obstructive pulmonary disease)     Deep vein thrombosis (DVT) of lower extremity associated with air travel " 01/13/2023    Dependence on supplemental oxygen     Eczema     Erectile dysfunction     due to organic reasons    Essential (primary) hypertension     Fracture     closed fracture of other tarsal and metatarsal bones    Fracture of proximal humerus 01/13/2023    GERD without esophagitis     High risk medication use     Hypercholesteremia     Hypomagnesemia     Infected stasis ulcer of left lower extremity 01/13/2023    Insomnia     Low back pain     Major depressive disorder     Morbid (severe) obesity due to excess calories     MRSA pneumonia     Muscle weakness     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 without evidence of necrosis     Obstructive sleep apnea (adult) (pediatric)     On home O2     REPORTS WEARING 2L/NC AAT    Other forms of dyspnea     Other long term (current) drug therapy     Other specified noninfective gastroenteritis and colitis     Other spondylosis, lumbar region     Pain in both knees     Paroxysmal atrial fibrillation     Peripheral neuropathy     attributed to type 2 diabetes    Pneumonia, unspecified organism     Polyneuropathy     Rash and other nonspecific skin eruption     Syncope and collapse     Tachycardia     Tinnitus 01/13/2023    Type 1 diabetes mellitus with diabetic chronic kidney disease     Type 2 diabetes mellitus     Unspecified fall, initial encounter     Urinary retention      Past Surgical History:   Procedure Laterality Date    CARDIAC CATHETERIZATION Left 8/15/2024    Procedure: Carbon dioxide aortogram with left leg angiogram, possible angioplasty or stenting;  Surgeon: Moshe Willson MD;  Location: [facility] CATH INVASIVE LOCATION;  Service: Vascular;  Laterality: Left;    CHOLECYSTECTOMY      CYSTOSCOPY      FEMUR SURGERY Left     Shravan placed    KNEE SURGERY Left     OTHER SURGICAL HISTORY Left     venous port, REMOVED    PORTACATH PLACEMENT Right     TIBIAL PLATEAU OPEN REDUCTION INTERNAL FIXATION Left 12/22/2023     Procedure: TIBIAL PLATEAU OPEN REDUCTION INTERNAL FIXATION;  Surgeon: Hugo Kline MD;  Location: [facility] OR;  Service: Orthopedics;  Laterality: Left;    TONSILLECTOMY AND ADENOIDECTOMY       Family History   Problem Relation Age of Onset    Coronary artery disease Mother     Hypertension Mother     Diabetes type II Mother     Asthma Father     Diabetes type II Sister     Cancer Sister      Social History     Socioeconomic History    Marital status:    Tobacco Use    Smoking status: Former     Current packs/day: 0.00     Average packs/day: 1 pack/day for 12.0 years (12.0 ttl pk-yrs)     Types: Cigarettes     Start date:      Quit date:      Years since quittin.0     Passive exposure: Past    Smokeless tobacco: Never   Vaping Use    Vaping status: Never Used   Substance and Sexual Activity    Alcohol use: Not Currently    Drug use: Never    Sexual activity: Defer     Allergies   Allergen Reactions    Benadryl [Diphenhydramine] Itching    Proventil [Albuterol] Other (See Comments)     Mouth sores         Current Outpatient Medications:     arformoterol (BROVANA) 15 MCG/2ML nebulizer solution, Take 2 mL by nebulization 2 (Two) Times a Day., Disp: 360 mL, Rfl: 3    Aspirin Low Dose 81 MG EC tablet, TAKE 1 TABLET BY MOUTH EVERY MORNING, Disp: 30 tablet, Rfl: 3    atorvastatin (LIPITOR) 20 MG tablet, TAKE 1 TABLET BY MOUTH EVERY NIGHT AT BEDTIME, Disp: 30 tablet, Rfl: 3    B-D UF III MINI PEN NEEDLES 31G X 5 MM misc, USE FOUR TIMES DAILY BEFORE MEALS AND AT BEDTIME, Disp: , Rfl:     budesonide (Pulmicort) 0.5 MG/2ML nebulizer solution, Take 2 mL by nebulization 2 (Two) Times a Day., Disp: 360 mL, Rfl: 3    bumetanide (BUMEX) 2 MG tablet, TAKE 1 TABLET BY MOUTH TWICE DAILY, Disp: 60 tablet, Rfl: 3    busPIRone (BUSPAR) 15 MG tablet, TAKE 1 TABLET BY MOUTH TWICE DAILY, Disp: 60 tablet, Rfl: 3    calcium citrate (CALCITRATE) 950 (200 Ca) MG tablet, TAKE 1 TABLET BY MOUTH EVERY NIGHT AT  BEDTIME, Disp: 30 tablet, Rfl: 3    carvedilol (COREG) 25 MG tablet, TAKE 1 TABLET BY MOUTH EVERY TWELVE HOURS, Disp: 60 tablet, Rfl: 3    Cholecalciferol (Vitamin D3) 50 MCG (2000 UT) tablet, TAKE 1 TABLET BY MOUTH EVERY MORNING, Disp: 30 tablet, Rfl: 3    docusate sodium (COLACE) 100 MG capsule, TAKE 1 CAPSULE BY MOUTH TWICE DAILY, Disp: 60 capsule, Rfl: 3    Eliquis 5 MG tablet tablet, TAKE 1 TABLET BY MOUTH EVERY TWELVE HOURS, Disp: 60 tablet, Rfl: 3    famotidine (PEPCID) 40 MG tablet, TAKE 1 TABLET BY MOUTH EVERY MORNING, Disp: 30 tablet, Rfl: 3    Farxiga 5 MG tablet tablet, TAKE 1 TABLET BY MOUTH EVERY MORNING, Disp: 30 tablet, Rfl: 3    ferrous gluconate (FERGON) 324 MG tablet, TAKE 1 TABLET BY MOUTH EVERY MORNING, Disp: 30 tablet, Rfl: 3    finasteride (PROSCAR) 5 MG tablet, TAKE 1 TABLET BY MOUTH EVERY NIGHT AT BEDTIME, Disp: 30 tablet, Rfl: 3    folic acid (FOLVITE) 1 MG tablet, TAKE 1 TABLET BY MOUTH EVERY NIGHT AT BEDTIME, Disp: 30 tablet, Rfl: 3    insulin detemir (Levemir) 100 UNIT/ML injection, Inject 15 Units under the skin into the appropriate area as directed Every Night for 180 days., Disp: 15 mL, Rfl: 1    Insulin Lispro, 1 Unit Dial, (HUMALOG) 100 UNIT/ML solution pen-injector, inject EIGHT units under the skin INTO THE APPROPRIATE AREA THREE TIMES DAILY BEFORE meals PER sliding scale, Disp: 15 mL, Rfl: 0    ipratropium-albuterol (DUO-NEB) 0.5-2.5 mg/3 ml nebulizer, Take 3 mL by nebulization Every 4 (Four) Hours As Needed for Wheezing or Shortness of Air., Disp: 360 mL, Rfl: 5    Isopropyl Myristate solution, Apply 1 Application topically to the appropriate area as directed., Disp: , Rfl:     Ketoconazole 1 % shampoo, Apply 10 mL topically Every 3 (Three) Days., Disp: 200 mL, Rfl: 0    magnesium oxide (MAG-OX) 400 MG tablet, TAKE 1 TABLET BY MOUTH EVERY NIGHT AT BEDTIME, Disp: 30 tablet, Rfl: 3    montelukast (SINGULAIR) 10 MG tablet, Take 1 tablet by mouth Every Night., Disp: 30 tablet, Rfl:  "5    Multiple Vitamins-Iron (GNP One Daily Plus Iron) tablet, TAKE 1 TABLET BY MOUTH EVERY MORNING, Disp: 30 each, Rfl: 3    NIFEdipine XL (PROCARDIA XL) 30 MG 24 hr tablet, TAKE 1 TABLET BY MOUTH EVERY MORNING, Disp: 30 tablet, Rfl: 3    O2 (OXYGEN), 2 Liter O2 - CONTINUOUS (route: Oxygen), Disp: , Rfl:     tamsulosin (FLOMAX) 0.4 MG capsule 24 hr capsule, TAKE 1 CAPSULE BY MOUTH EVERY NIGHT AT BEDTIME, Disp: 30 capsule, Rfl: 3    tiotropium (SPIRIVA) 18 MCG per inhalation capsule, Place 1 capsule into inhaler and inhale Daily., Disp: 30 capsule, Rfl: 5    traZODone (DESYREL) 100 MG tablet, Take 1.5 tablets by mouth Every Night., Disp: 135 tablet, Rfl: 1    vitamin C (ASCORBIC ACID) 500 MG tablet, TAKE 1 TABLET BY MOUTH TWICE DAILY, Disp: 60 tablet, Rfl: 3    Continuous Glucose Sensor (FreeStyle Bethany 3 Plus Sensor), Use 2 each Every 30 (Thirty) Days. Apply as directed every 15 days, Disp: 2 each, Rfl: 5    DULoxetine (CYMBALTA) 60 MG capsule, TAKE 1 CAPSULE BY MOUTH ONCE DAILY, Disp: 90 capsule, Rfl: 0    Semaglutide, 1 MG/DOSE, (Ozempic, 1 MG/DOSE,) 4 MG/3ML solution pen-injector, Inject 1 mg under the skin into the appropriate area as directed 1 (One) Time Per Week., Disp: 3 mL, Rfl: 5    Objective     Vitals:    12/11/24 1048   BP: 138/88   BP Location: Right arm   Patient Position: Sitting   Cuff Size: Large Adult   Pulse: (!) 48   SpO2: (!) 88%  Comment: 2 liters all the time   Weight: 109 kg (241 lb)   Height: 175.3 cm (69.02\")     Body mass index is 35.57 kg/m².    Physical Exam  Constitutional:       Appearance: Normal appearance. He is obese.      Comments: Severe Obesity with Comorbidity ( BMI 35 - 39.9) Pt Current BMI = 35.57 with comorbidities of hypertension, hyperlipidemia, CHF, diabetes   HENT:      Head: Normocephalic and atraumatic.      Right Ear: External ear normal.      Left Ear: External ear normal.      Nose: Nose normal.   Eyes:      Extraocular Movements: Extraocular movements intact.      " Conjunctiva/sclera: Conjunctivae normal.   Pulmonary:      Effort: Pulmonary effort is normal.   Musculoskeletal:         General: Normal range of motion.      Cervical back: Normal range of motion.   Skin:     General: Skin is warm and dry.   Neurological:      General: No focal deficit present.      Mental Status: He is alert and oriented to person, place, and time. Mental status is at baseline.   Psychiatric:         Mood and Affect: Mood normal.         Behavior: Behavior normal.         Thought Content: Thought content normal.         Judgment: Judgment normal.             Result Review :   The following data was reviewed by: CON Ashley on 12/11/2024:    Most Recent A1C          12/11/2024    10:56   HGBA1C Most Recent   Hemoglobin A1C 8.8        A1C Last 3 Results          7/18/2024    13:55 10/22/2024    12:49 12/11/2024    10:56   HGBA1C Last 3 Results   Hemoglobin A1C 8.30  7.5  8.8      A1c collected in the office today is 8.8%, indicating Uncontrolled Type II diabetes.  This result is up from the prior result of 7.5% collected on 10/22/24     Creatinine   Date Value Ref Range Status   12/31/2024 2.27 (H) 0.76 - 1.27 mg/dL Final   12/05/2024 2.16 (H) 0.76 - 1.27 mg/dL Final     eGFR   Date Value Ref Range Status   12/31/2024 32.4 (L) >60.0 mL/min/1.73 Final   12/05/2024 34.4 (L) >60.0 mL/min/1.73 Final     Labs collected on 12/5/24 show Stage IIIb moderate (GFR = 30-44 mL/min                Diagnoses and all orders for this visit:    1. Uncontrolled type 2 diabetes mellitus with hyperglycemia (Primary)  -     POC Glycosylated Hemoglobin (Hb A1C)  -     Continuous Glucose Sensor (FreeStyle Bethany 3 Plus Sensor); Use 2 each Every 30 (Thirty) Days. Apply as directed every 15 days  Dispense: 2 each; Refill: 5  -     Continuous Glucose  (FreeStyle Bethany 3 Fowler) device; Use 1 each 1 (One) Time for 1 dose.  Dispense: 1 each; Refill: 0  -     Semaglutide, 1 MG/DOSE, (Ozempic, 1 MG/DOSE,) 4  MG/3ML solution pen-injector; Inject 1 mg under the skin into the appropriate area as directed 1 (One) Time Per Week.  Dispense: 3 mL; Refill: 5  -     insulin detemir (Levemir) 100 UNIT/ML injection; Inject 15 Units under the skin into the appropriate area as directed Every Night for 180 days.  Dispense: 15 mL; Refill: 1    2. Type 2 diabetes mellitus with diabetic neuropathy, with long-term current use of insulin    3. Type 2 diabetes mellitus with stage 3b chronic kidney disease, with long-term current use of insulin    4. Persistent proteinuria    5. Type 2 diabetes mellitus with both eyes affected by mild nonproliferative retinopathy without macular edema, with long-term current use of insulin    6. Severe obesity (BMI 35.0-39.9) with comorbidity        Assessment & Plan  1. Diabetes mellitus.  His blood glucose levels have been consistently elevated, with recent readings around 203 and 182. He is currently on Ozempic once weekly, Farxiga 5 mg once daily, and Levemir 15 units at night. He is not taking Humalog with meals but uses it on a sliding scale at night. A prescription for the Bethany 3 reader and sensors will be issued to facilitate continuous glucose monitoring. He is advised to administer 5 units of Humalog insulin prior to each meal, in addition to the sliding scale. The sliding scale will be adjusted to add 1 unit of insulin for every 25 points above a blood glucose level of 150. The dosage of Ozempic will be increased to 1 mg to enhance blood glucose control and potentially reduce insulin requirements. He is instructed to report any gastrointestinal symptoms such as nausea, vomiting, diarrhea, constipation, or abdominal pain. A [facility] Ozempic 1 mg [facility] He can take two shots of 0.5 mg on the same day each week until the current pens are used up, then switch to 1 mg weekly. A refill for Levemir will be provided. The Bethany 3 reader and sensors will be sent to his  pharmacy.        The patient will monitor his blood glucose levels using the CGM.  If he develops problematic hyperglycemia or hypoglycemia or adverse drug reactions, he will contact the office for further instructions.        Follow Up     Return in about 3 months (around 3/11/2025) for Medication Management, CGM Follow-up.    Patient was given instructions and counseling regarding his condition or for health maintenance advice. Please see specific information pulled into the AVS if appropriate.     Neli Paredes, CON  12/11/2024        Dictated Utilizing Dragon Dictation.  Please note that portions of this note were completed with a voice recognition program.  Part of this note may be an electronic transcription/translation of spoken language to printed text using the Dragon Dictation System.

## 2024-12-11 NOTE — PATIENT INSTRUCTIONS
Take Humalog insulin to correct glucose levels over 150 mg/dl as follows:    Add the scale to the Humalog 5 units for your food    If blood glucose is less than 150 mg/dl - 0 units  If blood glucose is between 151 and 175 - 1 units  If blood glucose is between 176 and 200 - 2 units  If blood glucose is between 201 and 225 - 3 units  If blood glucose is between 226 and 250 - 4 units  If blood glucose is between 251 and 275 - 5 units  If blood glucose is between 276 and 300 - 6 units  If blood glucose is between 301 and 325 - 7 units  If blood glucose is between 326 and 350 - 8 units  If blood glucose is 351 or above - 10 units     We will increase the Ozempic to 1 mg once weekly.  If the pharmacy goes ahead and fills the new prescription for 1 mg pens you can use the 0.5 mg pens you have at home by taking 2 injections on the same day at the same time until you run out then you will start using the 1 mg pens

## 2024-12-12 ENCOUNTER — TELEPHONE (OUTPATIENT)
Dept: UROLOGY | Age: 59
End: 2024-12-12

## 2024-12-12 ENCOUNTER — OFFICE VISIT (OUTPATIENT)
Dept: PULMONOLOGY | Facility: CLINIC | Age: 59
End: 2024-12-12
Payer: COMMERCIAL

## 2024-12-12 ENCOUNTER — PRIOR AUTHORIZATION (OUTPATIENT)
Dept: DIABETES SERVICES | Facility: HOSPITAL | Age: 59
End: 2024-12-12
Payer: COMMERCIAL

## 2024-12-12 VITALS
BODY MASS INDEX: 35.7 KG/M2 | WEIGHT: 241 LBS | SYSTOLIC BLOOD PRESSURE: 141 MMHG | OXYGEN SATURATION: 95 % | HEART RATE: 73 BPM | TEMPERATURE: 96.8 F | DIASTOLIC BLOOD PRESSURE: 58 MMHG | RESPIRATION RATE: 16 BRPM | HEIGHT: 69 IN

## 2024-12-12 DIAGNOSIS — J15.1 PNEUMONIA DUE TO PSEUDOMONAS SPECIES, UNSPECIFIED LATERALITY, UNSPECIFIED PART OF LUNG: Primary | ICD-10-CM

## 2024-12-12 DIAGNOSIS — J44.1 COPD EXACERBATION: ICD-10-CM

## 2024-12-12 DIAGNOSIS — J96.12 ACUTE HYPOXIC ON CHRONIC HYPERCAPNIC RESPIRATORY FAILURE: ICD-10-CM

## 2024-12-12 DIAGNOSIS — F17.201 TOBACCO ABUSE, IN REMISSION: ICD-10-CM

## 2024-12-12 DIAGNOSIS — N18.4 CKD (CHRONIC KIDNEY DISEASE), STAGE IV: ICD-10-CM

## 2024-12-12 DIAGNOSIS — J47.1 BRONCHIECTASIS WITH ACUTE EXACERBATION: ICD-10-CM

## 2024-12-12 DIAGNOSIS — J96.01 ACUTE HYPOXIC ON CHRONIC HYPERCAPNIC RESPIRATORY FAILURE: ICD-10-CM

## 2024-12-12 PROCEDURE — 99214 OFFICE O/P EST MOD 30 MIN: CPT | Performed by: NURSE PRACTITIONER

## 2024-12-12 PROCEDURE — 1159F MED LIST DOCD IN RCRD: CPT | Performed by: NURSE PRACTITIONER

## 2024-12-12 PROCEDURE — 3077F SYST BP >= 140 MM HG: CPT | Performed by: NURSE PRACTITIONER

## 2024-12-12 PROCEDURE — 1160F RVW MEDS BY RX/DR IN RCRD: CPT | Performed by: NURSE PRACTITIONER

## 2024-12-12 PROCEDURE — 3078F DIAST BP <80 MM HG: CPT | Performed by: NURSE PRACTITIONER

## 2024-12-17 ENCOUNTER — OFFICE VISIT (OUTPATIENT)
Dept: WOUND CARE | Facility: HOSPITAL | Age: 59
End: 2024-12-17
Payer: COMMERCIAL

## 2024-12-17 ENCOUNTER — TELEPHONE (OUTPATIENT)
Dept: CASE MANAGEMENT | Facility: OTHER | Age: 59
End: 2024-12-17
Payer: COMMERCIAL

## 2024-12-17 VITALS
RESPIRATION RATE: 18 BRPM | TEMPERATURE: 96.7 F | DIASTOLIC BLOOD PRESSURE: 65 MMHG | HEART RATE: 82 BPM | SYSTOLIC BLOOD PRESSURE: 149 MMHG

## 2024-12-17 DIAGNOSIS — E11.621 TYPE 2 DIABETES MELLITUS WITH FOOT ULCER, WITH LONG-TERM CURRENT USE OF INSULIN: ICD-10-CM

## 2024-12-17 DIAGNOSIS — L89.620 PRESSURE INJURY OF LEFT HEEL, UNSTAGEABLE: Primary | ICD-10-CM

## 2024-12-17 DIAGNOSIS — Z79.4 TYPE 2 DIABETES MELLITUS WITH FOOT ULCER, WITH LONG-TERM CURRENT USE OF INSULIN: ICD-10-CM

## 2024-12-17 DIAGNOSIS — I73.9 PERIPHERAL ARTERIAL DISEASE WITH HISTORY OF REVASCULARIZATION: ICD-10-CM

## 2024-12-17 DIAGNOSIS — I50.32 CHRONIC DIASTOLIC HEART FAILURE: ICD-10-CM

## 2024-12-17 DIAGNOSIS — L97.509 TYPE 2 DIABETES MELLITUS WITH FOOT ULCER, WITH LONG-TERM CURRENT USE OF INSULIN: ICD-10-CM

## 2024-12-17 DIAGNOSIS — J96.11 CHRONIC RESPIRATORY FAILURE WITH HYPOXIA, ON HOME O2 THERAPY: ICD-10-CM

## 2024-12-17 DIAGNOSIS — Z99.81 CHRONIC RESPIRATORY FAILURE WITH HYPOXIA, ON HOME O2 THERAPY: ICD-10-CM

## 2024-12-17 DIAGNOSIS — Z98.890 PERIPHERAL ARTERIAL DISEASE WITH HISTORY OF REVASCULARIZATION: ICD-10-CM

## 2024-12-17 PROCEDURE — 3078F DIAST BP <80 MM HG: CPT | Performed by: EMERGENCY MEDICINE

## 2024-12-17 PROCEDURE — G0463 HOSPITAL OUTPT CLINIC VISIT: HCPCS | Performed by: EMERGENCY MEDICINE

## 2024-12-17 PROCEDURE — 3077F SYST BP >= 140 MM HG: CPT | Performed by: EMERGENCY MEDICINE

## 2024-12-17 PROCEDURE — 1159F MED LIST DOCD IN RCRD: CPT | Performed by: EMERGENCY MEDICINE

## 2024-12-17 PROCEDURE — 1160F RVW MEDS BY RX/DR IN RCRD: CPT | Performed by: EMERGENCY MEDICINE

## 2024-12-17 NOTE — PROGRESS NOTES
Chief Complaint  Wound Check (FOLLOW-UP ON LEFT HEEL WOUND )    Subjective      History of Present Illness    Preston Wallis  is a 59 y.o. male     History of Present Illness  The patient is a 59-year-old male here for reevaluation of a wound on the left heel. He is accompanied by his wife who does his dressing changes.    He reports overall good health but has been experiencing issues with his new electric wheelchair, which intermittently malfunctions. Despite these issues, he has been diligent in maintaining pressure relief on his left heel wound. He also reports persistent pain in his fifth toe although the adjacent wound is healed. His wife has observed a reduction in the size of the heel wound. He has been using a towel under his boot to alleviate pressure during sleep. He typically sleeps on his side and has been using a foam offloading boot for the past 3 months. He has been adhering to a daily regimen of Betadine-soaked gauze as part of his wound care routine.    Allergies:  Benadryl [diphenhydramine] and Proventil [albuterol]      Current Outpatient Medications:     arformoterol (BROVANA) 15 MCG/2ML nebulizer solution, Take 2 mL by nebulization 2 (Two) Times a Day., Disp: 360 mL, Rfl: 3    Aspirin Low Dose 81 MG EC tablet, TAKE 1 TABLET BY MOUTH EVERY MORNING, Disp: 30 tablet, Rfl: 3    atorvastatin (LIPITOR) 20 MG tablet, TAKE 1 TABLET BY MOUTH EVERY NIGHT AT BEDTIME, Disp: 30 tablet, Rfl: 3    B-D UF III MINI PEN NEEDLES 31G X 5 MM misc, USE FOUR TIMES DAILY BEFORE MEALS AND AT BEDTIME, Disp: , Rfl:     budesonide (Pulmicort) 0.5 MG/2ML nebulizer solution, Take 2 mL by nebulization 2 (Two) Times a Day., Disp: 360 mL, Rfl: 3    bumetanide (BUMEX) 2 MG tablet, TAKE 1 TABLET BY MOUTH TWICE DAILY, Disp: 60 tablet, Rfl: 3    busPIRone (BUSPAR) 15 MG tablet, TAKE 1 TABLET BY MOUTH TWICE DAILY, Disp: 60 tablet, Rfl: 3    calcium citrate (CALCITRATE) 950 (200 Ca) MG tablet, TAKE 1 TABLET BY MOUTH EVERY NIGHT AT  BEDTIME, Disp: 30 tablet, Rfl: 3    carvedilol (COREG) 25 MG tablet, TAKE 1 TABLET BY MOUTH EVERY TWELVE HOURS, Disp: 60 tablet, Rfl: 3    Cholecalciferol (Vitamin D3) 50 MCG (2000 UT) tablet, TAKE 1 TABLET BY MOUTH EVERY MORNING, Disp: 30 tablet, Rfl: 3    Continuous Glucose Sensor (FreeStyle Bethany 3 Plus Sensor), Use 2 each Every 30 (Thirty) Days. Apply as directed every 15 days, Disp: 2 each, Rfl: 5    docusate sodium (COLACE) 100 MG capsule, TAKE 1 CAPSULE BY MOUTH TWICE DAILY, Disp: 60 capsule, Rfl: 3    DULoxetine (Cymbalta) 60 MG capsule, Take 1 capsule by mouth Daily., Disp: 90 capsule, Rfl: 1    Eliquis 5 MG tablet tablet, TAKE 1 TABLET BY MOUTH EVERY TWELVE HOURS, Disp: 60 tablet, Rfl: 3    famotidine (PEPCID) 40 MG tablet, TAKE 1 TABLET BY MOUTH EVERY MORNING, Disp: 30 tablet, Rfl: 3    Farxiga 5 MG tablet tablet, TAKE 1 TABLET BY MOUTH EVERY MORNING, Disp: 30 tablet, Rfl: 3    ferrous gluconate (FERGON) 324 MG tablet, TAKE 1 TABLET BY MOUTH EVERY MORNING, Disp: 30 tablet, Rfl: 3    finasteride (PROSCAR) 5 MG tablet, TAKE 1 TABLET BY MOUTH EVERY NIGHT AT BEDTIME, Disp: 30 tablet, Rfl: 3    folic acid (FOLVITE) 1 MG tablet, TAKE 1 TABLET BY MOUTH EVERY NIGHT AT BEDTIME, Disp: 30 tablet, Rfl: 3    insulin detemir (Levemir) 100 UNIT/ML injection, Inject 15 Units under the skin into the appropriate area as directed Every Night for 180 days., Disp: 15 mL, Rfl: 1    Insulin Lispro, 1 Unit Dial, (HUMALOG) 100 UNIT/ML solution pen-injector, inject EIGHT units under the skin INTO THE APPROPRIATE AREA THREE TIMES DAILY BEFORE meals PER sliding scale, Disp: 15 mL, Rfl: 0    ipratropium-albuterol (DUO-NEB) 0.5-2.5 mg/3 ml nebulizer, Take 3 mL by nebulization Every 4 (Four) Hours As Needed for Wheezing or Shortness of Air., Disp: 360 mL, Rfl: 5    Isopropyl Myristate solution, Apply 1 Application topically to the appropriate area as directed., Disp: , Rfl:     Ketoconazole 1 % shampoo, Apply 10 mL topically Every 3  "(Three) Days., Disp: 200 mL, Rfl: 0    magnesium oxide (MAG-OX) 400 MG tablet, TAKE 1 TABLET BY MOUTH EVERY NIGHT AT BEDTIME, Disp: 30 tablet, Rfl: 3    montelukast (SINGULAIR) 10 MG tablet, Take 1 tablet by mouth Every Night., Disp: 30 tablet, Rfl: 5    Multiple Vitamins-Iron (GNP One Daily Plus Iron) tablet, TAKE 1 TABLET BY MOUTH EVERY MORNING, Disp: 30 each, Rfl: 3    NIFEdipine XL (PROCARDIA XL) 30 MG 24 hr tablet, TAKE 1 TABLET BY MOUTH EVERY MORNING, Disp: 30 tablet, Rfl: 3    O2 (OXYGEN), 2 Liter O2 - CONTINUOUS (route: Oxygen), Disp: , Rfl:     Semaglutide, 1 MG/DOSE, (Ozempic, 1 MG/DOSE,) 4 MG/3ML solution pen-injector, Inject 1 mg under the skin into the appropriate area as directed 1 (One) Time Per Week., Disp: 3 mL, Rfl: 5    tamsulosin (FLOMAX) 0.4 MG capsule 24 hr capsule, TAKE 1 CAPSULE BY MOUTH EVERY NIGHT AT BEDTIME, Disp: 30 capsule, Rfl: 3    tiotropium (SPIRIVA) 18 MCG per inhalation capsule, Place 1 capsule into inhaler and inhale Daily., Disp: 30 capsule, Rfl: 5    traZODone (DESYREL) 100 MG tablet, Take 1.5 tablets by mouth Every Night., Disp: 135 tablet, Rfl: 1    vitamin C (ASCORBIC ACID) 500 MG tablet, TAKE 1 TABLET BY MOUTH TWICE DAILY, Disp: 60 tablet, Rfl: 3    Past Medical History:   Diagnosis Date    Age-related cognitive decline     Allergic contact dermatitis     Allergies     Anemia     Bedbound     11/2023 \"MY LEG MUSCLES STOPPED WORKING\"    Bronchiectasis with acute lower respiratory infection     Charcot foot due to diabetes mellitus 09/10/2013    Chronic diastolic (congestive) heart failure     Chronic kidney disease     Chronic respiratory failure with hypoxia     Closed supracondylar fracture of femur 01/12/2022    COPD (chronic obstructive pulmonary disease)     Deep vein thrombosis (DVT) of lower extremity associated with air travel 01/13/2023    Dependence on supplemental oxygen     Eczema     Erectile dysfunction     due to organic reasons    Essential (primary) " hypertension     Fracture     closed fracture of other tarsal and metatarsal bones    Fracture of proximal humerus 01/13/2023    GERD without esophagitis     High risk medication use     Hypercholesteremia     Hypomagnesemia     Infected stasis ulcer of left lower extremity 01/13/2023    Insomnia     Low back pain     Major depressive disorder     Morbid (severe) obesity due to excess calories     MRSA pneumonia     Muscle weakness     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 without evidence of necrosis     Obstructive sleep apnea (adult) (pediatric)     On home O2     REPORTS WEARING 2L/NC AAT    Other forms of dyspnea     Other long term (current) drug therapy     Other specified noninfective gastroenteritis and colitis     Other spondylosis, lumbar region     Pain in both knees     Paroxysmal atrial fibrillation     Peripheral neuropathy     attributed to type 2 diabetes    Pneumonia, unspecified organism     Polyneuropathy     Rash and other nonspecific skin eruption     Syncope and collapse     Tachycardia     Tinnitus 01/13/2023    Type 1 diabetes mellitus with diabetic chronic kidney disease     Type 2 diabetes mellitus     Unspecified fall, initial encounter     Urinary retention      Past Surgical History:   Procedure Laterality Date    CARDIAC CATHETERIZATION Left 8/15/2024    Procedure: Carbon dioxide aortogram with left leg angiogram, possible angioplasty or stenting;  Surgeon: Moshe Willson MD;  Location: [facility] INVASIVE LOC[facility];  Service: Vascular;  Laterality: Left;    CHOLECYSTECTOMY      CYSTOSCOPY      FEMUR SURGERY Left     Shravan placed    KNEE SURGERY Left     OTHER SURGICAL HISTORY Left     venous port, REMOVED    PORTACATH PLACEMENT Right     TIBIAL PLATEAU OPEN REDUCTION INTERNAL FIXATION Left 12/22/2023    Procedure: TIBIAL PLATEAU OPEN REDUCTION INTERNAL FIXATION;  Surgeon: Hugo Kline MD;  Location: [facility] OR;  Service:  Orthopedics;  Laterality: Left;    TONSILLECTOMY AND ADENOIDECTOMY       Social History     Socioeconomic History    Marital status:    Tobacco Use    Smoking status: Former     Current packs/day: 0.00     Average packs/day: 1 pack/day for 12.0 years (12.0 ttl pk-yrs)     Types: Cigarettes     Start date:      Quit date:      Years since quittin.9     Passive exposure: Past    Smokeless tobacco: Never   Vaping Use    Vaping status: Never Used   Substance and Sexual Activity    Alcohol use: Not Currently    Drug use: Never    Sexual activity: Defer           Objective     Vitals:    24 1014   BP: 149/65   BP Location: Left arm   Patient Position: Sitting   Pulse: 82   Resp: 18  Comment: O2: 93% ON 2L/NC   Temp: 96.7 °F (35.9 °C)   TempSrc: Temporal   PainSc:   2   PainLoc: Foot  Comment: LEFT FOOT PAIN     There is no height or weight on file to calculate BMI.    STEADI Fall Risk Assessment was completed, and patient is at LOW risk for falls.Assessment completed on:10/22/2024     Review of Systems     ROS:  Per HPI.     I have reviewed the HPI and ROS as documented by MA/RN. Katerin Torres MD    Physical Exam     NAD  AAOx3, pleasant, cooperative      Physical Exam  The patient's right heel has a thick black eschar with elevation along the edges, some of which was able to be peeled away revealing improved underlying tissue health and decreased size of the wound. The lateral plantar foot has a small callus underlying the fifth MPJ with no open wound at today's visit.               Result Review :  The following data was reviewed by: Katerin Torres MD on 2024:    Prior notes and images.               Assessment and Plan   Diagnoses and all orders for this visit:    1. Pressure injury of left heel, unstageable (Primary)    2. Type 2 diabetes mellitus with foot ulcer, with long-term current use of insulin    3. Chronic respiratory failure with hypoxia, on home O2 therapy    4. Peripheral  arterial disease with history of revascularization    5. Chronic diastolic heart failure        Assessment & Plan  1. Left heel wound.  The wound on the left heel shows signs of improvement, with some areas lifting in on the edges. There issome old blood in the middle, indicating extra pressure. Dead skin was trimmed away from the edges to reveal healthier underlying tissue and a decreased wound size. He is advised to continue using Betadine-soaked gauze once a day and to keep pressure off the wound, especially during sleep by using a foam boot. He should contact the clinic if any issues arise between now and the next appointment.    2. Plantar wound.  The wound along the left fifth MPJ is now healed.  There is a small callus present.  They should apply Vaseline, Aquaphor, or equivalent moisturizer 1-2 times daily to improve skin health.    Follow-up  The patient will follow up in 8 weeks.      Patient was given instructions and counseling regarding their condition or for health maintenance advice, as well as the wound care plan and recommendations. They understand and agree with the plan.  They will follow back up here in the clinic but are instructed to contact us in the interim should they have any new, returning, or worsening symptoms or concerns. Please see specific information pulled into the AVS if appropriate.     Dragon Dictation utilized for chart completion.    Follow Up   Return in about 8 weeks (around 2/11/2025).      Katerin Torres MD    Patient or patient representative verbalized consent for the use of Ambient Listening during the visit with  Katerin Torres MD for chart documentation. 12/17/2024  11:32 EST

## 2024-12-19 ENCOUNTER — PATIENT OUTREACH (OUTPATIENT)
Dept: CASE MANAGEMENT | Facility: OTHER | Age: 59
End: 2024-12-19
Payer: COMMERCIAL

## 2024-12-19 ENCOUNTER — TELEPHONE (OUTPATIENT)
Dept: UROLOGY | Facility: CLINIC | Age: 59
End: 2024-12-19

## 2024-12-19 ENCOUNTER — TELEPHONE (OUTPATIENT)
Dept: FAMILY MEDICINE CLINIC | Age: 59
End: 2024-12-19
Payer: COMMERCIAL

## 2024-12-19 DIAGNOSIS — R33.9 URINARY RETENTION: Primary | ICD-10-CM

## 2024-12-19 DIAGNOSIS — Z79.4 TYPE 2 DIABETES MELLITUS WITH HYPERGLYCEMIA, WITH LONG-TERM CURRENT USE OF INSULIN: Primary | ICD-10-CM

## 2024-12-19 DIAGNOSIS — E11.65 TYPE 2 DIABETES MELLITUS WITH HYPERGLYCEMIA, WITH LONG-TERM CURRENT USE OF INSULIN: Primary | ICD-10-CM

## 2024-12-19 DIAGNOSIS — N31.2 NEUROGENIC BLADDER, FLACCID: ICD-10-CM

## 2024-12-19 NOTE — TELEPHONE ENCOUNTER
.    Caller: Kami Wallis    Relationship: Emergency Contact    Best call back number: [phone]     What medication are you requesting: PHENERGAN OR ZOFRAN    What are your current symptoms: VOMITING    How long have you been experiencing symptoms:TWO DAY    Have you had these symptoms before:    [x] Yes  [] No    Have you been treated for these symptoms before:   [x] Yes  [] No    If a prescription is needed, what is your preferred pharmacy and phone number: [facility] [address] [phone] [facility] [phone]      Additional notes:    PATIENTS WIFE ADVISED PATIENT HAS BEEN VOMITING FOR THE LAST TWO DAYS AND REQUESTS THAT MEDICATION BE SENT IN FOR THESE SYMPTOMS

## 2024-12-19 NOTE — OUTREACH NOTE
AMBULATORY CASE MANAGEMENT NOTE    Names and Relationships of Patient/Support Persons: Contact: Roopa at [facility]; Relationship:  -     Reached out to Roopa to see about the lab order for Gómez.  Repeat CMP for liver values. Left message to return call.      Tara GOMEZ  Ambulatory Case Management    12/19/2024, 15:14 EST

## 2024-12-19 NOTE — TELEPHONE ENCOUNTER
Caller: Kami Wallis    Relationship to patient: Emergency Contact    Best call back number: [phone]    Patient is needing: PT CALLED AND CANCELLED SAME DAY APPOINTMENT BECAUSE HE HAS THE STOMACH BUG AND CAN NOT MAKE IT IN TOMORROW. PT WOULD LIKE A REFERRAL TO TRANSFER OF CARE PREFERABLY AT [facility] [place] OR [facility]. PT WANTED TO LET DR. LONG KNOW [facility] AND HE WILL BE VERY MISSED.

## 2024-12-20 ENCOUNTER — PATIENT OUTREACH (OUTPATIENT)
Dept: CASE MANAGEMENT | Facility: OTHER | Age: 59
End: 2024-12-20
Payer: COMMERCIAL

## 2024-12-20 DIAGNOSIS — J96.11 CHRONIC RESPIRATORY FAILURE WITH HYPOXIA AND HYPERCAPNIA: Primary | ICD-10-CM

## 2024-12-20 DIAGNOSIS — J96.12 CHRONIC RESPIRATORY FAILURE WITH HYPOXIA AND HYPERCAPNIA: Primary | ICD-10-CM

## 2024-12-20 NOTE — TELEPHONE ENCOUNTER
Spoke with Elizabeth, she says that he is feeling better today,  Gómez did not want to do a Telehealth to see a provider.  His PCP is out of the office.  She feels like it is drainage and that he may have a sinus infection.  Going to use otc antihistamines.  Will call if need additional care.

## 2024-12-20 NOTE — OUTREACH NOTE
AMBULATORY CASE MANAGEMENT NOTE    Names and Relationships of Patient/Support Persons: Contact: Court at [facility]; Relationship:  -     Following up from telephone encounter from yesterday.  Inquired about his tobramycin nebs, Elizabeth says that it has been a while.  I am not sure if and how long he will have to continue these.  Reached out to pulmonology for advise.  They are going to reach out to patient.    Conclusion, he is to continue danna nebs and they are sending in script to [facility].     Tara GOMEZ  Ambulatory Case Management    12/20/2024, 14:07 EST

## 2024-12-27 ENCOUNTER — TELEPHONE (OUTPATIENT)
Dept: FAMILY MEDICINE CLINIC | Age: 59
End: 2024-12-27
Payer: COMMERCIAL

## 2024-12-27 ENCOUNTER — PATIENT OUTREACH (OUTPATIENT)
Dept: CASE MANAGEMENT | Facility: OTHER | Age: 59
End: 2024-12-27
Payer: COMMERCIAL

## 2024-12-27 DIAGNOSIS — J96.11 CHRONIC RESPIRATORY FAILURE WITH HYPOXIA AND HYPERCAPNIA: Primary | ICD-10-CM

## 2024-12-27 DIAGNOSIS — J96.12 CHRONIC RESPIRATORY FAILURE WITH HYPOXIA AND HYPERCAPNIA: Primary | ICD-10-CM

## 2024-12-27 NOTE — OUTREACH NOTE
AMBULATORY CASE MANAGEMENT NOTE    Names and Relationships of Patient/Support Persons: Contact: Lizet Mcelroy; Relationship:  -     Lizet called stating that she is scheduled for a port flush and needed supplies have not been delivered to home. Verbal order given a few weeks ago to Judy.  Called [facility] and they put in a refill yesterday to be delivered today.  Left message with Court also for CMP for liver function.      Tara GOMEZ  Ambulatory Case Management    12/27/2024, 08:46 EST

## 2024-12-29 DIAGNOSIS — F41.9 ANXIETY AND DEPRESSION: ICD-10-CM

## 2024-12-29 DIAGNOSIS — F32.A ANXIETY AND DEPRESSION: ICD-10-CM

## 2024-12-30 ENCOUNTER — PATIENT OUTREACH (OUTPATIENT)
Dept: CASE MANAGEMENT | Facility: OTHER | Age: 59
End: 2024-12-30
Payer: COMMERCIAL

## 2024-12-30 DIAGNOSIS — D50.8 IRON DEFICIENCY ANEMIA SECONDARY TO INADEQUATE DIETARY IRON INTAKE: ICD-10-CM

## 2024-12-30 DIAGNOSIS — E11.65 TYPE 2 DIABETES MELLITUS WITH HYPERGLYCEMIA, WITH LONG-TERM CURRENT USE OF INSULIN: ICD-10-CM

## 2024-12-30 DIAGNOSIS — Z79.4 TYPE 2 DIABETES MELLITUS WITH HYPERGLYCEMIA, WITH LONG-TERM CURRENT USE OF INSULIN: ICD-10-CM

## 2024-12-30 DIAGNOSIS — J96.11 CHRONIC RESPIRATORY FAILURE WITH HYPOXIA AND HYPERCAPNIA: Primary | ICD-10-CM

## 2024-12-30 DIAGNOSIS — Z74.09 IMPAIRED MOBILITY AND ENDURANCE: ICD-10-CM

## 2024-12-30 DIAGNOSIS — N18.31 STAGE 3A CHRONIC KIDNEY DISEASE: ICD-10-CM

## 2024-12-30 DIAGNOSIS — J96.12 CHRONIC RESPIRATORY FAILURE WITH HYPOXIA AND HYPERCAPNIA: Primary | ICD-10-CM

## 2024-12-30 RX ORDER — DULOXETIN HYDROCHLORIDE 60 MG/1
60 CAPSULE, DELAYED RELEASE ORAL DAILY
Qty: 90 CAPSULE | Refills: 0 | Status: SHIPPED | OUTPATIENT
Start: 2024-12-30

## 2024-12-30 NOTE — OUTREACH NOTE
[facility] End of Month Documentation    This Chronic Medical Management Care Plan for Preston Wallis, 59 y.o. male, has been monitored and managed; reviewed and a new plan of care implemented for the month of December.  A cumulative time of 47  minutes was spent on this patient record this month, including phone call with care giver; electronic communication with primary care provider; electronic communication with pharmacist; chart review; phone call with patient.    Regarding the patient's problems: has Chronic cough; Pneumonia due to COVID-19 virus; Polyneuropathy; Paroxysmal atrial fibrillation; Obstructive sleep apnea; MRSA pneumonia; Low back pain; Chronic diastolic (congestive) heart failure; Allergies; COPD exacerbation; Chronic anticoagulation; Benign prostatic hyperplasia; Difficulty using continuous positive airway pressure (CPAP) device; Stage 3a chronic kidney disease; Iron deficiency anemia secondary to inadequate dietary iron intake; Vitamin D deficiency; Class 3 severe obesity with serious comorbidity in adult; Lower extremity edema; Elevated alkaline phosphatase level; Venous insufficiency (chronic) (peripheral); Tobacco abuse, in remission; History of Pseudomonas pneumonia; Chronic dyspnea; Gastroesophageal reflux disease; Bronchiectasis without complication; ERIC (acute kidney injury); Altered mental status; Hyperlipidemia; Luetscher's syndrome; Neurogenic bladder; Class 1 obesity; Pneumonia due to Pseudomonas species; Seizures; Primary osteoarthritis of left knee; Other constipation; Chronic obstructive pulmonary disease; Type 2 diabetes mellitus with hyperglycemia, with long-term current use of insulin; Essential hypertension; Stage 3b chronic kidney disease; Annual physical exam; Long-term use of high-risk medication; Personal history of PE (pulmonary embolism); Encounter for aftercare for healing closed traumatic fracture of left femur; Chronic pain of left knee; Primary osteoarthritis of right knee;  Sepsis; Bronchiectasis with acute exacerbation; Bacterial pneumonia; Anemia; Closed fracture of left tibial plateau; Septic joint; Septic arthritis; Skin ulcer of left heel, limited to breakdown of skin; Ulcer of left foot, limited to breakdown of skin; Left foot pain; Wheezing; Acute on chronic respiratory failure with hypercapnia; Chronic respiratory failure with hypoxia and hypercapnia; Acute hypoxic on chronic hypercapnic respiratory failure; Impaired cognition; and Atherosclerosis of native arteries of the extremities with ulceration on their problem list., the following items were addressed: medications; transitions to medical care; medical records; referrals to community service providers and any changes can be found within the plan section of the note.  A detailed listing of time spent for chronic care management is tracked within each outreach encounter.  Current medications include:  has a current medication list which includes the following prescription(s): arformoterol, aspirin low dose, atorvastatin, b-d uf iii mini pen needles, budesonide, bumetanide, buspirone, calcium citrate, carvedilol, vitamin d3, freestyle clau 3 plus sensor, docusate sodium, duloxetine, eliquis, famotidine, farxiga, ferrous gluconate, finasteride, folic acid, levemir, insulin lispro (1 unit dial), ipratropium-albuterol, isopropyl myristate, ketoconazole, magnesium oxide, montelukast, gnp one daily plus iron, nifedipine xl, o2, ozempic (1 mg/dose), tamsulosin, tiotropium, trazodone, and vitamin c. and the patient is reported to be caregiver will take responsibility for med compliance; patient is compliant with medication protocol,  Medications are reported to be non-effective in controlling symptoms and changes have been made to the medication protocol.  Regarding these diagnoses, referrals were made to the following provider(s):  specialists.  All notes on chart for PCP to review.    The patient was monitored remotely for pain;  medications; blood glucose; mood & behavior; activity level.    The patient's physical needs include:  help taking medications as prescribed; medication education; needs assistance with ADLs; resources for disability needs; DME supplies.     The patient's mental support needs include:  continued support    The patient's cognitive support needs include:  medication; continued support; needs assistance with ADLs; health care    The patient's psychosocial support needs include:  continued support; coordination of community providers; medication management or adherence    The patient's functional needs include: health care coverage; medication education; needs assistance for ADLs; physical healthcare; physician referral; resources for disability needs    The patient's environmental needs include:  resources for disability needs; no involvement in outside activities or no access to other activities    Care Plan overall comments:  Still not at goal, however improving. will continue to follow. Has many upcoming appointments and referrals to specialists. A1c not at goal, continuing ways to improve with addition of new medications. Will follow, new hospitalization.    Refer to previous outreach notes for more information on the areas listed above.    Monthly Billing Diagnoses  (J96.11,  J96.12) Chronic respiratory failure with hypoxia and hypercapnia    (E11.65,  Z79.4) Type 2 diabetes mellitus with hyperglycemia, with long-term current use of insulin    (N18.31) Stage 3a chronic kidney disease    (Z74.09) Impaired mobility and endurance    (D50.8) Iron deficiency anemia secondary to inadequate dietary iron intake    Medications   Medications have been reconciled    Care Plan progress this month:      Recently Modified Care Plans Updates made since 11/29/2024 12:00 AM      No recently modified care plans.             Chronic Kidney       Track and Manage My Symptoms (Not Progressing)       Start:  12/06/24    Expected End:   06/06/25         My Symptom Management To Do List       Start:  12/06/24         Why is this important?  Keeping track of symptoms can help you feel the best. It also helps the doctor stay on top of any changes to the disease. It may also help keep your disease from getting worse. Taking simple steps can help you cope with  symptoms like feeling very tired or itchy skin.              Follow My Treatment Plan (Progressing)       Start:  12/06/24    Expected End:  06/06/25         My Treatment Plan To Do List       Start:  12/06/24         Why is this important?  Staying as healthy as you can is very important. This may mean making changes if you smoke, don't exercise or eat poorly.  A healthy lifestyle is an important goal for you.  Following the treatment plan and making changes may be hard.  Try some of these steps to help keep the disease from getting worse.              Manage My Diet (Not Progressing)       Start:  12/06/24    Expected End:  06/06/25         My Diet Management To Do List       Start:  12/06/24         Why is this important?  A healthy diet is important for mental and physical health.  Healthy food helps repair damaged body tissue and maintains strong bones and muscles.  No single food is just right so eating a variety of proteins, fruits, vegetables and grains is best.  You may need to change what you eat or drink to manage kidney disease.  A dietitian is the best person to guide you.              Optimal Care Coordination of a Patient Experiencing Chronic Kidney Disease (Progressing)       Start:  12/06/24    Expected End:  06/06/25         Support Psychological Response to Chronic Kidney Disease       Start:  12/06/24          Initial    Support Psychological Response to Chronic Kidney Disease: caregiver stress acknowledged;caregiver support provided;goal setting facilitated;positive reinforcement provided;self-care encouraged taken at 12/06/24         Facilitate Activities to Optimize Comfort  and Well-Being       Start:  12/06/24          Initial    Facilitate Activities to Optimize Comfort and Well-Being: alternating periods of activity with rest promoted;sleep-hygiene practices encouraged taken at 12/06/24         Alleviate Barriers to Chronic Kidney Disease Treatment       Start:  12/06/24          Initial    Alleviate Barriers to Chronic Kidney Disease Treatment: medication side effects managed;barriers to treatment adherence identified;activity or exercise based on tolerance encouraged Reached out to nephrology office to review labs and any interventions needed.  Sent abnormal labs to oncall provider and pcp. taken at 12/06/24         Maintain or Improve Strength or Functional Ability       Start:  12/06/24          Initial    Maintain or Improve Strength or Functional Ability: activity or exercise based on tolerance encouraged;assistive or adaptive device use encouraged taken at 12/06/24         Alleviate Barriers to Optimal Nutrition Status       Start:  12/06/24          Initial    Alleviate Barriers to Optimal Nutrition Status: diversity of foods encouraged;support and encouragement provided taken at 12/06/24         Facilitate Multi-Modal Pain Management       Start:  12/06/24          Initial    Facilitate Multi-Modal Pain Management: pain assessed taken at 12/06/24         Develop and Maintain Palliative Care Plan       Start:  12/06/24                Current Specialty Plan of Care Status signed by both patient and provider    Instructions   Patient was provided an electronic copy of care plan  CCM services were explained and offered and patient has accepted these services.  Patient has given their written consent to receive CCM services and understands that this includes the authorization of electronic communication of medical information with the other treating providers.  Patient understands that they may stop CCM services at any time and these changes will be effective at the end of the  calendar month and will effectively revocate the agreement of CCM services.  Patient understands that only one practitioner can furnish and be paid for CCM services during one calendar month.  Patient also understands that there may be co-payment or deductible fees in association with CCM services.  Patient will continue with at least monthly follow-up calls with the Ambulatory .    Tara GOMEZ  Ambulatory Case Management    12/30/2024, 14:32 EST

## 2024-12-31 PROCEDURE — 80053 COMPREHEN METABOLIC PANEL: CPT | Performed by: FAMILY MEDICINE

## 2025-01-05 ENCOUNTER — OUTSIDE FACILITY SERVICE (OUTPATIENT)
Dept: FAMILY MEDICINE CLINIC | Age: 60
End: 2025-01-05
Payer: COMMERCIAL

## 2025-01-05 PROCEDURE — G0179 MD RECERTIFICATION HHA PT: HCPCS | Performed by: FAMILY MEDICINE

## 2025-01-09 ENCOUNTER — OUTSIDE FACILITY SERVICE (OUTPATIENT)
Dept: FAMILY MEDICINE CLINIC | Age: 60
End: 2025-01-09
Payer: COMMERCIAL

## 2025-01-10 ENCOUNTER — PATIENT OUTREACH (OUTPATIENT)
Dept: CASE MANAGEMENT | Facility: OTHER | Age: 60
End: 2025-01-10
Payer: COMMERCIAL

## 2025-01-10 DIAGNOSIS — J96.12 CHRONIC RESPIRATORY FAILURE WITH HYPOXIA AND HYPERCAPNIA: Primary | ICD-10-CM

## 2025-01-10 DIAGNOSIS — J96.11 CHRONIC RESPIRATORY FAILURE WITH HYPOXIA AND HYPERCAPNIA: Primary | ICD-10-CM

## 2025-01-10 NOTE — OUTREACH NOTE
AMBULATORY CASE MANAGEMENT NOTE    Names and Relationships of Patient/Support Persons:  -     Roopa called reporting that she had checked on Gómez yesterday.    He is on his tobramycin nebs this month, having some thick, yellow, brown sputum.  Will check to see if sputum culture is needed.  His oxygen saturations are still in the 90's but he is having some shortness of air.  May need pulmonology appointment next week.    Roopa reports that wife reports milky urine, cloudy.  Will check with urology to see if urine culture would be appropriate.    Also reports feet terribly swollen. Not wrapping.  Foot looks good.   Family is now doing medications and wondering if diuretic is put in planners correctly.  There was a question about his Ozempic dose, was told there was an increase in dose.  Confirmed that his 0.5 dose was increased to 1mg.  He is having constipation with this despite the colace bid dose.  He has not had a bm in 11 days.  Encouraged miralax bid until bm and then continue daily.  He has fleet enema at home that he is going to  use in addition.    Our office closed today at 12, will call family on Monday to check with him.      Tara GOMEZ  Ambulatory Case Management    1/10/2025, 12:17 EST

## 2025-01-13 ENCOUNTER — PATIENT OUTREACH (OUTPATIENT)
Dept: CASE MANAGEMENT | Facility: OTHER | Age: 60
End: 2025-01-13
Payer: COMMERCIAL

## 2025-01-13 DIAGNOSIS — J96.11 CHRONIC RESPIRATORY FAILURE WITH HYPOXIA AND HYPERCAPNIA: Primary | ICD-10-CM

## 2025-01-13 DIAGNOSIS — N18.31 STAGE 3A CHRONIC KIDNEY DISEASE: ICD-10-CM

## 2025-01-13 DIAGNOSIS — Z79.4 TYPE 2 DIABETES MELLITUS WITH HYPERGLYCEMIA, WITH LONG-TERM CURRENT USE OF INSULIN: ICD-10-CM

## 2025-01-13 DIAGNOSIS — J96.12 CHRONIC RESPIRATORY FAILURE WITH HYPOXIA AND HYPERCAPNIA: Primary | ICD-10-CM

## 2025-01-13 DIAGNOSIS — E11.65 TYPE 2 DIABETES MELLITUS WITH HYPERGLYCEMIA, WITH LONG-TERM CURRENT USE OF INSULIN: ICD-10-CM

## 2025-01-13 NOTE — OUTREACH NOTE
AMBULATORY CASE MANAGEMENT NOTE    Names and Relationships of Patient/Support Persons:  -     Followed up with Elizabeth from report from Friday:     Thick yellow. Brown mucous - Elizabeth states that he is on the Harjit nebs and this has improved with Harjit.  He seems to getting it up a little better also using percussion.  Nothing to report.    Urine milky, cloudy.  Elizabeth reports that yes it is sometimes cloudy, he does not complain of pain at all.  This morning it seemed a little clearer.  She did add a probiotic.  He is on Farxiga and could exacerbate yeast    Feet swollen.  This is from dependent edema from him not being able to lie down.  This am his feet looked better because he was lying down flat.  She says that he sleeps all day and is up most of the night.    She did not increase his dose of Ozempic due to the constipation he is having.  She did do an enema over the weekend with very little results.  She is instructed to give him miralax 2 times a day until he has a good bowel movement and then to continue every day. She says that he is on a stool softener twice daily, he also needs to stay on that.    Called nephrology to see if he is scheduled on Jan 28, there is no appointment.  He is scheduled both on 3/28 and 4/11- he should be seen on 3/28.      Tara GOMEZ  Ambulatory Case Management    1/13/2025, 10:07 EST

## 2025-01-14 ENCOUNTER — TELEPHONE (OUTPATIENT)
Dept: UROLOGY | Age: 60
End: 2025-01-14
Payer: COMMERCIAL

## 2025-01-14 NOTE — TELEPHONE ENCOUNTER
PATIENT HAS NEW PATIENT APPOINTMENT WITH DR. LEWIS ON 07/07/25.    HIS WIFE, INGA, CALLED.  SHE WANTS TO KNOW IF THEY CAN GET A PRESCRIPTION FOR MORE CATHETERS.  HE GOES THROUGH [facility].  SHE SAID HE CATHS EVERY 3 TO 4 HOURS, DEPENDING UPON WHAT HE DRINKS.    DR. LONG WAS THE PROVIDER WHO ORDERED THEM, PREVIOUSLY.    I TOLD HER I WILL GIVE MESSAGE TO DR. LEWIS STAFF.  HE MAY HAVE TO BE SEEN, PRIOR TO GETTING A PRESCRIPTION.    #[phone]

## 2025-01-16 NOTE — TELEPHONE ENCOUNTER
Spoke to patients wife. She stated patient is cathing every 3-4 hours, 5 times a day. Patient has no more catheters at this time, patient is using sons. I informed her that we do have samples they can  at the office until he receives his. She stated that she cannot come to [facility] at this time to pick them up as they live in [place]. I did inform her we can try to send the order in but the patient may need to be seen prior to us filling.

## 2025-01-17 ENCOUNTER — TELEPHONE (OUTPATIENT)
Dept: UROLOGY | Age: 60
End: 2025-01-17

## 2025-01-17 ENCOUNTER — TELEPHONE (OUTPATIENT)
Dept: UROLOGY | Age: 60
End: 2025-01-17
Payer: COMMERCIAL

## 2025-01-17 NOTE — TELEPHONE ENCOUNTER
Name: Kami Wallis      Relationship: Emergency Contact      Best Callback Number: [phone] (home)      HUB PROVIDED THE RELAY MESSAGE FROM THE OFFICE      PATIENT: SCHEDULED PER NOTE    ADDITIONAL INFORMATION: SEPARATE TE FOR CALL BACK.

## 2025-01-17 NOTE — TELEPHONE ENCOUNTER
Called patient and LMOM to give us call back. Patient needs to get schedule to see francisco next available and in [facility] well. I would like to know what catheters he is using so I can get him some samples until his order of catheters come in. HUB OKAY TO RELAY AND SCHEDULE APPOINTMENT.

## 2025-01-17 NOTE — TELEPHONE ENCOUNTER
Hub staff attempted to follow warm transfer process and was unsuccessful     Caller: Kami Wallis    Relationship to patient: Emergency Contact    Best call back number: [phone] (home)  ANYTIME    Patient is needing: ATTEMPTED TO DETERMINE WHICH CATH IS NEEDED FOR SAMPLES PER PREVIOUS TE 01/17/2025.  PATIENT'S WIFE ONLY KNEW THAT THEY CURRENTLY RECEIVE FROM [facility].  PLEASE CALL HER ASAP TO ADVISE/DISCUSS.  THANK YOU.

## 2025-01-21 ENCOUNTER — READMISSION MANAGEMENT (OUTPATIENT)
Dept: CALL CENTER | Facility: HOSPITAL | Age: 60
End: 2025-01-21
Payer: COMMERCIAL

## 2025-01-21 ENCOUNTER — TELEPHONE (OUTPATIENT)
Dept: FAMILY MEDICINE CLINIC | Age: 60
End: 2025-01-21
Payer: COMMERCIAL

## 2025-01-21 NOTE — TELEPHONE ENCOUNTER
Caller: [facility] STAFF    Relationship to patient: HOSPITAL STAFF    Best call back number: [phone]    New or established patient?  [] New  [x] Established    Date of discharge: 01/21/2025    Facility discharged from: [facility]    Diagnosis/Symptoms: N/A    Length of stay (If applicable): N/A    Specialty Only: Did you see a [facility] health provider?    [] Yes  [x] No  If so, who? [place], [place]    Additional Details: PATIENT IS BEING DISCHARGED TODAY 01/21/2025 FROM [facility] IN [place], [place]. HOSPITAL STAFF CALLED TO SCHEDULE HOSPITAL FOLLOW UP. HIS APPOINTMENT IS ON 01/29/2025.

## 2025-01-22 ENCOUNTER — TRANSITIONAL CARE MANAGEMENT TELEPHONE ENCOUNTER (OUTPATIENT)
Dept: CALL CENTER | Facility: HOSPITAL | Age: 60
End: 2025-01-22
Payer: COMMERCIAL

## 2025-01-22 NOTE — OUTREACH NOTE
Call Center TCM Note      Flowsheet Row Responses   [facility] patient discharged from? Non-BH   Does the patient have one of the following disease processes/diagnoses(primary or secondary)? Other   TCM attempt successful? Yes   Call start time 1504   Call end time 1510   Discharge diagnosis unknown   Is patient permission given to speak with other caregiver? Yes   List who call center can speak with Wife   Person spoke with today (if not patient) and relationship Wife   Does the patient have all medications ordered at discharge? Yes   Comments 1/29/2025 12:00 PM  HOSPITAL FOLLOW UP   Does the patient have an appointment with their PCP within 7-14 days of discharge? Yes   DME comments Patient is on continuous O2 at home, per Kami's report. Patient multiple nebulizer treatments daily and wears his percussion vest.   Comments Per pt's wife, he just arrived home 3hrs ago and is sleeping. Pt has not c/o chest pain, fever or chills, his wife reports. Patient's coughing has improved, his wife reports. RN educated wife on importance of pt performing cough and deep breathing q2hr while awake, Kami v/u.   What is the patient's perception of their health status since discharge? Improving   Is the patient/caregiver able to teach back signs and symptoms related to disease process for when to call PCP? Yes   Is the patient/caregiver able to teach back signs and symptoms related to disease process for when to call 911? Yes   TCM call completed? Yes   Call end time 1510            Rashida Diaz RN    1/22/2025, 15:11 EST

## 2025-01-23 ENCOUNTER — HOSPITAL ENCOUNTER (INPATIENT)
Facility: HOSPITAL | Age: 60
LOS: 28 days | Discharge: HOME HEALTH | End: 2025-02-20
Attending: EMERGENCY MEDICINE | Admitting: STUDENT IN AN ORGANIZED HEALTH CARE EDUCATION/TRAINING PROGRAM
Payer: COMMERCIAL

## 2025-01-23 ENCOUNTER — APPOINTMENT (OUTPATIENT)
Dept: GENERAL RADIOLOGY | Facility: HOSPITAL | Age: 60
End: 2025-01-23
Payer: COMMERCIAL

## 2025-01-23 ENCOUNTER — APPOINTMENT (OUTPATIENT)
Dept: CARDIOLOGY | Facility: HOSPITAL | Age: 60
End: 2025-01-23
Payer: COMMERCIAL

## 2025-01-23 DIAGNOSIS — Z79.4 TYPE 2 DIABETES MELLITUS WITH HYPERGLYCEMIA, WITH LONG-TERM CURRENT USE OF INSULIN: ICD-10-CM

## 2025-01-23 DIAGNOSIS — E11.65 UNCONTROLLED TYPE 2 DIABETES MELLITUS WITH HYPERGLYCEMIA: ICD-10-CM

## 2025-01-23 DIAGNOSIS — F17.201 TOBACCO ABUSE, IN REMISSION: ICD-10-CM

## 2025-01-23 DIAGNOSIS — S31.809D WOUND OF GLUTEAL CLEFT, UNSPECIFIED LATERALITY, SUBSEQUENT ENCOUNTER: ICD-10-CM

## 2025-01-23 DIAGNOSIS — J44.9 CHRONIC OBSTRUCTIVE PULMONARY DISEASE, UNSPECIFIED COPD TYPE: ICD-10-CM

## 2025-01-23 DIAGNOSIS — T83.511A URINARY TRACT INFECTION ASSOCIATED WITH INDWELLING URETHRAL CATHETER, INITIAL ENCOUNTER: ICD-10-CM

## 2025-01-23 DIAGNOSIS — R26.2 DIFFICULTY WALKING: ICD-10-CM

## 2025-01-23 DIAGNOSIS — T78.40XA ALLERGY, INITIAL ENCOUNTER: ICD-10-CM

## 2025-01-23 DIAGNOSIS — G47.33 OBSTRUCTIVE SLEEP APNEA: ICD-10-CM

## 2025-01-23 DIAGNOSIS — J44.1 COPD EXACERBATION: ICD-10-CM

## 2025-01-23 DIAGNOSIS — J96.11 CHRONIC RESPIRATORY FAILURE WITH HYPOXIA AND HYPERCAPNIA: ICD-10-CM

## 2025-01-23 DIAGNOSIS — J96.12 ACUTE HYPOXIC ON CHRONIC HYPERCAPNIC RESPIRATORY FAILURE: ICD-10-CM

## 2025-01-23 DIAGNOSIS — J47.9 BRONCHIECTASIS WITHOUT COMPLICATION: ICD-10-CM

## 2025-01-23 DIAGNOSIS — R06.09 CHRONIC DYSPNEA: ICD-10-CM

## 2025-01-23 DIAGNOSIS — B59 PNEUMONIA OF BOTH LOWER LOBES DUE TO PNEUMOCYSTIS JIROVECII: ICD-10-CM

## 2025-01-23 DIAGNOSIS — J15.1 PNEUMONIA DUE TO PSEUDOMONAS SPECIES, UNSPECIFIED LATERALITY, UNSPECIFIED PART OF LUNG: Primary | ICD-10-CM

## 2025-01-23 DIAGNOSIS — J15.1 PNEUMONIA OF BOTH LUNGS DUE TO PSEUDOMONAS SPECIES, UNSPECIFIED PART OF LUNG: ICD-10-CM

## 2025-01-23 DIAGNOSIS — J47.1 BRONCHIECTASIS WITH ACUTE EXACERBATION: ICD-10-CM

## 2025-01-23 DIAGNOSIS — J96.12 CHRONIC RESPIRATORY FAILURE WITH HYPOXIA AND HYPERCAPNIA: ICD-10-CM

## 2025-01-23 DIAGNOSIS — J96.01 ACUTE HYPOXIC ON CHRONIC HYPERCAPNIC RESPIRATORY FAILURE: ICD-10-CM

## 2025-01-23 DIAGNOSIS — E11.65 TYPE 2 DIABETES MELLITUS WITH HYPERGLYCEMIA, WITH LONG-TERM CURRENT USE OF INSULIN: ICD-10-CM

## 2025-01-23 DIAGNOSIS — K61.0 PERIANAL ABSCESS: ICD-10-CM

## 2025-01-23 DIAGNOSIS — J15.9 BACTERIAL PNEUMONIA: ICD-10-CM

## 2025-01-23 DIAGNOSIS — N39.0 URINARY TRACT INFECTION ASSOCIATED WITH INDWELLING URETHRAL CATHETER, INITIAL ENCOUNTER: ICD-10-CM

## 2025-01-23 PROBLEM — J18.9 PNA (PNEUMONIA): Status: ACTIVE | Noted: 2025-01-23

## 2025-01-23 LAB
ALBUMIN SERPL-MCNC: 3.1 G/DL (ref 3.5–5.2)
ALBUMIN/GLOB SERPL: 0.6 G/DL
ALP SERPL-CCNC: 200 U/L (ref 39–117)
ALT SERPL W P-5'-P-CCNC: 42 U/L (ref 1–41)
ANION GAP SERPL CALCULATED.3IONS-SCNC: 10.6 MMOL/L (ref 5–15)
ARTERIAL PATENCY WRIST A: POSITIVE
AST SERPL-CCNC: 49 U/L (ref 1–40)
ATMOSPHERIC PRESS: 748.4 MMHG
BACTERIA UR QL AUTO: ABNORMAL /HPF
BASE EXCESS BLDA CALC-SCNC: 0.8 MMOL/L (ref -2–2)
BASOPHILS # BLD AUTO: 0.05 10*3/MM3 (ref 0–0.2)
BASOPHILS NFR BLD AUTO: 0.2 % (ref 0–1.5)
BDY SITE: ABNORMAL
BILIRUB SERPL-MCNC: 0.2 MG/DL (ref 0–1.2)
BILIRUB UR QL STRIP: NEGATIVE
BUN SERPL-MCNC: 50 MG/DL (ref 6–20)
BUN/CREAT SERPL: 23.1 (ref 7–25)
CALCIUM SPEC-SCNC: 8.6 MG/DL (ref 8.6–10.5)
CHLORIDE SERPL-SCNC: 94 MMOL/L (ref 98–107)
CLARITY UR: ABNORMAL
CO2 SERPL-SCNC: 30.4 MMOL/L (ref 22–29)
COLOR UR: YELLOW
CREAT SERPL-MCNC: 2.16 MG/DL (ref 0.76–1.27)
D-LACTATE SERPL-SCNC: 0.9 MMOL/L (ref 0.5–2)
DEPRECATED RDW RBC AUTO: 46.1 FL (ref 37–54)
EGFRCR SERPLBLD CKD-EPI 2021: 34.4 ML/MIN/1.73
EOSINOPHIL # BLD AUTO: 0.22 10*3/MM3 (ref 0–0.4)
EOSINOPHIL NFR BLD AUTO: 0.9 % (ref 0.3–6.2)
ERYTHROCYTE [DISTWIDTH] IN BLOOD BY AUTOMATED COUNT: 14 % (ref 12.3–15.4)
FLUAV SUBTYP SPEC NAA+PROBE: NOT DETECTED
FLUBV RNA ISLT QL NAA+PROBE: NOT DETECTED
GAS FLOW AIRWAY: 3 LPM
GEN 5 1HR TROPONIN T REFLEX: 92 NG/L
GLOBULIN UR ELPH-MCNC: 4.8 GM/DL
GLUCOSE BLDC GLUCOMTR-MCNC: 205 MG/DL (ref 70–99)
GLUCOSE BLDC GLUCOMTR-MCNC: 219 MG/DL (ref 70–99)
GLUCOSE SERPL-MCNC: 252 MG/DL (ref 65–99)
GLUCOSE UR STRIP-MCNC: ABNORMAL MG/DL
GRAN CASTS URNS QL MICRO: ABNORMAL /LPF
HCO3 BLDA-SCNC: 27.5 MMOL/L (ref 22–26)
HCT VFR BLD AUTO: 30.2 % (ref 37.5–51)
HCT VFR BLD CALC: 27 % (ref 38–51)
HEMODILUTION: NO
HGB BLD-MCNC: 9 G/DL (ref 13–17.7)
HGB BLDA-MCNC: 9.2 G/DL (ref 12–18)
HGB UR QL STRIP.AUTO: ABNORMAL
HOLD SPECIMEN: NORMAL
HOLD SPECIMEN: NORMAL
HYALINE CASTS UR QL AUTO: ABNORMAL /LPF
IMM GRANULOCYTES # BLD AUTO: 0.22 10*3/MM3 (ref 0–0.05)
IMM GRANULOCYTES NFR BLD AUTO: 0.9 % (ref 0–0.5)
INHALED O2 CONCENTRATION: 32 %
KETONES UR QL STRIP: NEGATIVE
LEUKOCYTE ESTERASE UR QL STRIP.AUTO: ABNORMAL
LYMPHOCYTES # BLD AUTO: 1.17 10*3/MM3 (ref 0.7–3.1)
LYMPHOCYTES NFR BLD AUTO: 4.8 % (ref 19.6–45.3)
MCH RBC QN AUTO: 27.4 PG (ref 26.6–33)
MCHC RBC AUTO-ENTMCNC: 29.8 G/DL (ref 31.5–35.7)
MCV RBC AUTO: 91.8 FL (ref 79–97)
MODALITY: ABNORMAL
MONOCYTES # BLD AUTO: 1.28 10*3/MM3 (ref 0.1–0.9)
MONOCYTES NFR BLD AUTO: 5.3 % (ref 5–12)
NEUTROPHILS NFR BLD AUTO: 21.34 10*3/MM3 (ref 1.7–7)
NEUTROPHILS NFR BLD AUTO: 87.9 % (ref 42.7–76)
NITRITE UR QL STRIP: NEGATIVE
NRBC BLD AUTO-RTO: 0 /100 WBC (ref 0–0.2)
NT-PROBNP SERPL-MCNC: 1193 PG/ML (ref 0–900)
PCO2 BLDA: 54.1 MM HG (ref 35–45)
PH BLDA: 7.32 PH UNITS (ref 7.35–7.45)
PH UR STRIP.AUTO: 5.5 [PH] (ref 5–8)
PLATELET # BLD AUTO: 419 10*3/MM3 (ref 140–450)
PMV BLD AUTO: 8.3 FL (ref 6–12)
PO2 BLD: 296 MM[HG] (ref 0–500)
PO2 BLDA: 94.8 MM HG (ref 80–100)
POTASSIUM SERPL-SCNC: 4.9 MMOL/L (ref 3.5–5.2)
PROT SERPL-MCNC: 7.9 G/DL (ref 6–8.5)
PROT UR QL STRIP: ABNORMAL
RBC # BLD AUTO: 3.29 10*6/MM3 (ref 4.14–5.8)
RBC # UR STRIP: ABNORMAL /HPF
REF LAB TEST METHOD: ABNORMAL
RSV RNA NPH QL NAA+NON-PROBE: NOT DETECTED
SAO2 % BLDCOA: 96.5 % (ref 95–99)
SARS-COV-2 RNA RESP QL NAA+PROBE: NOT DETECTED
SODIUM SERPL-SCNC: 135 MMOL/L (ref 136–145)
SP GR UR STRIP: 1.02 (ref 1–1.03)
SQUAMOUS #/AREA URNS HPF: ABNORMAL /HPF
TROPONIN T % DELTA: -2
TROPONIN T NUMERIC DELTA: -2 NG/L
TROPONIN T SERPL HS-MCNC: 94 NG/L
UROBILINOGEN UR QL STRIP: ABNORMAL
WBC # UR STRIP: ABNORMAL /HPF
WBC NRBC COR # BLD AUTO: 24.28 10*3/MM3 (ref 3.4–10.8)
WHOLE BLOOD HOLD COAG: NORMAL
WHOLE BLOOD HOLD SPECIMEN: NORMAL
YEAST URNS QL MICRO: ABNORMAL /HPF

## 2025-01-23 PROCEDURE — 81001 URINALYSIS AUTO W/SCOPE: CPT

## 2025-01-23 PROCEDURE — 87899 AGENT NOS ASSAY W/OPTIC: CPT | Performed by: NURSE PRACTITIONER

## 2025-01-23 PROCEDURE — 25810000003 SODIUM CHLORIDE 0.9 % SOLUTION

## 2025-01-23 PROCEDURE — 25010000002 FUROSEMIDE PER 20 MG: Performed by: STUDENT IN AN ORGANIZED HEALTH CARE EDUCATION/TRAINING PROGRAM

## 2025-01-23 PROCEDURE — 82948 REAGENT STRIP/BLOOD GLUCOSE: CPT

## 2025-01-23 PROCEDURE — 87637 SARSCOV2&INF A&B&RSV AMP PRB: CPT

## 2025-01-23 PROCEDURE — 71045 X-RAY EXAM CHEST 1 VIEW: CPT

## 2025-01-23 PROCEDURE — 99223 1ST HOSP IP/OBS HIGH 75: CPT | Performed by: STUDENT IN AN ORGANIZED HEALTH CARE EDUCATION/TRAINING PROGRAM

## 2025-01-23 PROCEDURE — 99285 EMERGENCY DEPT VISIT HI MDM: CPT

## 2025-01-23 PROCEDURE — 85025 COMPLETE CBC W/AUTO DIFF WBC: CPT

## 2025-01-23 PROCEDURE — 93005 ELECTROCARDIOGRAM TRACING: CPT

## 2025-01-23 PROCEDURE — 83880 ASSAY OF NATRIURETIC PEPTIDE: CPT | Performed by: EMERGENCY MEDICINE

## 2025-01-23 PROCEDURE — 94799 UNLISTED PULMONARY SVC/PX: CPT

## 2025-01-23 PROCEDURE — 87449 NOS EACH ORGANISM AG IA: CPT | Performed by: NURSE PRACTITIONER

## 2025-01-23 PROCEDURE — 93306 TTE W/DOPPLER COMPLETE: CPT

## 2025-01-23 PROCEDURE — 25010000002 METHYLPREDNISOLONE PER 125 MG

## 2025-01-23 PROCEDURE — 25010000002 MEROPENEM PER 100 MG: Performed by: STUDENT IN AN ORGANIZED HEALTH CARE EDUCATION/TRAINING PROGRAM

## 2025-01-23 PROCEDURE — 94640 AIRWAY INHALATION TREATMENT: CPT

## 2025-01-23 PROCEDURE — 25010000002 PIPERACILLIN SOD-TAZOBACTAM PER 1 G

## 2025-01-23 PROCEDURE — 82803 BLOOD GASES ANY COMBINATION: CPT

## 2025-01-23 PROCEDURE — 93010 ELECTROCARDIOGRAM REPORT: CPT | Performed by: INTERNAL MEDICINE

## 2025-01-23 PROCEDURE — 87040 BLOOD CULTURE FOR BACTERIA: CPT

## 2025-01-23 PROCEDURE — 84484 ASSAY OF TROPONIN QUANT: CPT | Performed by: EMERGENCY MEDICINE

## 2025-01-23 PROCEDURE — 93005 ELECTROCARDIOGRAM TRACING: CPT | Performed by: EMERGENCY MEDICINE

## 2025-01-23 PROCEDURE — 63710000001 INSULIN LISPRO (HUMAN) PER 5 UNITS: Performed by: STUDENT IN AN ORGANIZED HEALTH CARE EDUCATION/TRAINING PROGRAM

## 2025-01-23 PROCEDURE — 80053 COMPREHEN METABOLIC PANEL: CPT | Performed by: EMERGENCY MEDICINE

## 2025-01-23 PROCEDURE — 36600 WITHDRAWAL OF ARTERIAL BLOOD: CPT

## 2025-01-23 PROCEDURE — 63710000001 INSULIN GLARGINE PER 5 UNITS: Performed by: STUDENT IN AN ORGANIZED HEALTH CARE EDUCATION/TRAINING PROGRAM

## 2025-01-23 PROCEDURE — 83605 ASSAY OF LACTIC ACID: CPT

## 2025-01-23 RX ORDER — IBUPROFEN 600 MG/1
1 TABLET ORAL
Status: DISCONTINUED | OUTPATIENT
Start: 2025-01-23 | End: 2025-02-20 | Stop reason: HOSPADM

## 2025-01-23 RX ORDER — SODIUM CHLORIDE 0.9 % (FLUSH) 0.9 %
10 SYRINGE (ML) INJECTION AS NEEDED
Status: DISCONTINUED | OUTPATIENT
Start: 2025-01-23 | End: 2025-02-20 | Stop reason: HOSPADM

## 2025-01-23 RX ORDER — IPRATROPIUM BROMIDE AND ALBUTEROL SULFATE 2.5; .5 MG/3ML; MG/3ML
3 SOLUTION RESPIRATORY (INHALATION) ONCE
Status: COMPLETED | OUTPATIENT
Start: 2025-01-23 | End: 2025-01-23

## 2025-01-23 RX ORDER — METHYLPREDNISOLONE SODIUM SUCCINATE 125 MG/2ML
125 INJECTION, POWDER, LYOPHILIZED, FOR SOLUTION INTRAMUSCULAR; INTRAVENOUS ONCE
Status: COMPLETED | OUTPATIENT
Start: 2025-01-23 | End: 2025-01-23

## 2025-01-23 RX ORDER — TAMSULOSIN HYDROCHLORIDE 0.4 MG/1
0.4 CAPSULE ORAL DAILY
Status: DISCONTINUED | OUTPATIENT
Start: 2025-01-24 | End: 2025-02-20 | Stop reason: HOSPADM

## 2025-01-23 RX ORDER — TOBRAMYCIN INHALATION SOLUTION 300 MG/5ML
300 INHALANT RESPIRATORY (INHALATION)
COMMUNITY

## 2025-01-23 RX ORDER — AMOXICILLIN 250 MG
2 CAPSULE ORAL 2 TIMES DAILY PRN
Status: DISCONTINUED | OUTPATIENT
Start: 2025-01-23 | End: 2025-01-28

## 2025-01-23 RX ORDER — NITROGLYCERIN 0.4 MG/1
0.4 TABLET SUBLINGUAL
Status: DISCONTINUED | OUTPATIENT
Start: 2025-01-23 | End: 2025-02-20 | Stop reason: HOSPADM

## 2025-01-23 RX ORDER — PREDNISONE 20 MG/1
40 TABLET ORAL
COMMUNITY
Start: 2025-01-22 | End: 2025-02-20 | Stop reason: HOSPADM

## 2025-01-23 RX ORDER — SODIUM CHLORIDE 9 MG/ML
40 INJECTION, SOLUTION INTRAVENOUS AS NEEDED
Status: DISCONTINUED | OUTPATIENT
Start: 2025-01-23 | End: 2025-02-16

## 2025-01-23 RX ORDER — FAMOTIDINE 20 MG/1
40 TABLET, FILM COATED ORAL EVERY MORNING
Status: DISCONTINUED | OUTPATIENT
Start: 2025-01-24 | End: 2025-02-06

## 2025-01-23 RX ORDER — BUSPIRONE HYDROCHLORIDE 15 MG/1
15 TABLET ORAL 2 TIMES DAILY
Status: DISCONTINUED | OUTPATIENT
Start: 2025-01-23 | End: 2025-02-20 | Stop reason: HOSPADM

## 2025-01-23 RX ORDER — BUDESONIDE 0.5 MG/2ML
0.5 INHALANT ORAL 2 TIMES DAILY
Status: DISCONTINUED | OUTPATIENT
Start: 2025-01-24 | End: 2025-02-20 | Stop reason: HOSPADM

## 2025-01-23 RX ORDER — SACCHAROMYCES BOULARDII 250 MG
250 CAPSULE ORAL DAILY
COMMUNITY
Start: 2025-01-22 | End: 2025-02-20 | Stop reason: HOSPADM

## 2025-01-23 RX ORDER — ATORVASTATIN CALCIUM 20 MG/1
20 TABLET, FILM COATED ORAL DAILY
Status: DISCONTINUED | OUTPATIENT
Start: 2025-01-24 | End: 2025-02-17

## 2025-01-23 RX ORDER — GUAIFENESIN 600 MG/1
600 TABLET, EXTENDED RELEASE ORAL EVERY 12 HOURS SCHEDULED
Status: DISCONTINUED | OUTPATIENT
Start: 2025-01-23 | End: 2025-02-20 | Stop reason: HOSPADM

## 2025-01-23 RX ORDER — BISACODYL 10 MG
10 SUPPOSITORY, RECTAL RECTAL DAILY PRN
Status: DISCONTINUED | OUTPATIENT
Start: 2025-01-23 | End: 2025-01-28

## 2025-01-23 RX ORDER — BUMETANIDE 1 MG/1
2 TABLET ORAL 2 TIMES DAILY
Status: DISCONTINUED | OUTPATIENT
Start: 2025-01-23 | End: 2025-02-15

## 2025-01-23 RX ORDER — LANOLIN ALCOHOL/MO/W.PET/CERES
1 CREAM (GRAM) TOPICAL
COMMUNITY
Start: 2025-01-02

## 2025-01-23 RX ORDER — MEROPENEM 1 G/1
1 INJECTION, POWDER, FOR SOLUTION INTRAVENOUS EVERY 12 HOURS SCHEDULED
COMMUNITY
Start: 2025-01-22 | End: 2025-02-20 | Stop reason: HOSPADM

## 2025-01-23 RX ORDER — BUDESONIDE 0.5 MG/2ML
0.5 INHALANT ORAL
Status: DISCONTINUED | OUTPATIENT
Start: 2025-01-23 | End: 2025-01-23

## 2025-01-23 RX ORDER — DULOXETIN HYDROCHLORIDE 30 MG/1
60 CAPSULE, DELAYED RELEASE ORAL DAILY
Status: DISCONTINUED | OUTPATIENT
Start: 2025-01-24 | End: 2025-01-29

## 2025-01-23 RX ORDER — SODIUM CHLORIDE 0.9 % (FLUSH) 0.9 %
10 SYRINGE (ML) INJECTION EVERY 12 HOURS SCHEDULED
Status: DISCONTINUED | OUTPATIENT
Start: 2025-01-23 | End: 2025-02-20 | Stop reason: HOSPADM

## 2025-01-23 RX ORDER — SODIUM CHLORIDE 0.9 % (FLUSH) 0.9 %
10 SYRINGE (ML) INJECTION AS NEEDED
Status: DISCONTINUED | OUTPATIENT
Start: 2025-01-23 | End: 2025-02-16

## 2025-01-23 RX ORDER — MONTELUKAST SODIUM 10 MG/1
10 TABLET ORAL NIGHTLY
Status: DISCONTINUED | OUTPATIENT
Start: 2025-01-23 | End: 2025-02-20 | Stop reason: HOSPADM

## 2025-01-23 RX ORDER — FERROUS SULFATE 325(65) MG
325 TABLET ORAL
Status: DISCONTINUED | OUTPATIENT
Start: 2025-01-24 | End: 2025-02-20 | Stop reason: HOSPADM

## 2025-01-23 RX ORDER — DEXTROSE MONOHYDRATE 25 G/50ML
25 INJECTION, SOLUTION INTRAVENOUS
Status: DISCONTINUED | OUTPATIENT
Start: 2025-01-23 | End: 2025-02-20 | Stop reason: HOSPADM

## 2025-01-23 RX ORDER — BISACODYL 5 MG/1
5 TABLET, DELAYED RELEASE ORAL DAILY PRN
Status: DISCONTINUED | OUTPATIENT
Start: 2025-01-23 | End: 2025-01-28

## 2025-01-23 RX ORDER — HEPARIN SODIUM 5000 [USP'U]/ML
5000 INJECTION, SOLUTION INTRAVENOUS; SUBCUTANEOUS EVERY 8 HOURS SCHEDULED
Status: DISCONTINUED | OUTPATIENT
Start: 2025-01-23 | End: 2025-01-23

## 2025-01-23 RX ORDER — CARVEDILOL 25 MG/1
25 TABLET ORAL EVERY 12 HOURS
Status: DISCONTINUED | OUTPATIENT
Start: 2025-01-23 | End: 2025-02-20 | Stop reason: HOSPADM

## 2025-01-23 RX ORDER — INSULIN LISPRO 100 [IU]/ML
2-7 INJECTION, SOLUTION INTRAVENOUS; SUBCUTANEOUS
Status: DISCONTINUED | OUTPATIENT
Start: 2025-01-23 | End: 2025-01-28

## 2025-01-23 RX ORDER — NIFEDIPINE 30 MG/1
30 TABLET, EXTENDED RELEASE ORAL EVERY MORNING
Status: DISCONTINUED | OUTPATIENT
Start: 2025-01-24 | End: 2025-02-12

## 2025-01-23 RX ORDER — NICOTINE POLACRILEX 4 MG
15 LOZENGE BUCCAL
Status: DISCONTINUED | OUTPATIENT
Start: 2025-01-23 | End: 2025-02-20 | Stop reason: HOSPADM

## 2025-01-23 RX ORDER — FUROSEMIDE 10 MG/ML
40 INJECTION INTRAMUSCULAR; INTRAVENOUS ONCE
Status: COMPLETED | OUTPATIENT
Start: 2025-01-23 | End: 2025-01-23

## 2025-01-23 RX ORDER — POLYETHYLENE GLYCOL 3350 17 G/17G
17 POWDER, FOR SOLUTION ORAL DAILY PRN
Status: DISCONTINUED | OUTPATIENT
Start: 2025-01-23 | End: 2025-01-28

## 2025-01-23 RX ORDER — ASPIRIN 81 MG/1
81 TABLET ORAL EVERY MORNING
Status: DISCONTINUED | OUTPATIENT
Start: 2025-01-24 | End: 2025-02-20 | Stop reason: HOSPADM

## 2025-01-23 RX ADMIN — Medication 10 ML: at 23:33

## 2025-01-23 RX ADMIN — MONTELUKAST 10 MG: 10 TABLET, FILM COATED ORAL at 23:31

## 2025-01-23 RX ADMIN — GUAIFENESIN 600 MG: 600 TABLET ORAL at 23:31

## 2025-01-23 RX ADMIN — BUMETANIDE 2 MG: 1 TABLET ORAL at 23:31

## 2025-01-23 RX ADMIN — MEROPENEM 1000 MG: 1 INJECTION INTRAVENOUS at 23:33

## 2025-01-23 RX ADMIN — INSULIN GLARGINE 15 UNITS: 100 INJECTION, SOLUTION SUBCUTANEOUS at 23:33

## 2025-01-23 RX ADMIN — BUSPIRONE HYDROCHLORIDE 15 MG: 5 TABLET ORAL at 23:31

## 2025-01-23 RX ADMIN — APIXABAN 5 MG: 5 TABLET, FILM COATED ORAL at 23:31

## 2025-01-23 RX ADMIN — INSULIN LISPRO 3 UNITS: 100 INJECTION, SOLUTION INTRAVENOUS; SUBCUTANEOUS at 23:33

## 2025-01-23 RX ADMIN — CARVEDILOL 25 MG: 25 TABLET, FILM COATED ORAL at 23:31

## 2025-01-23 RX ADMIN — SODIUM CHLORIDE 3690 ML: 9 INJECTION, SOLUTION INTRAVENOUS at 17:38

## 2025-01-23 RX ADMIN — FUROSEMIDE 40 MG: 10 INJECTION, SOLUTION INTRAMUSCULAR; INTRAVENOUS at 23:32

## 2025-01-23 RX ADMIN — IPRATROPIUM BROMIDE AND ALBUTEROL SULFATE 3 ML: .5; 3 SOLUTION RESPIRATORY (INHALATION) at 20:31

## 2025-01-23 RX ADMIN — PIPERACILLIN AND TAZOBACTAM 3.38 G: 3; .375 INJECTION, POWDER, FOR SOLUTION INTRAVENOUS at 17:59

## 2025-01-23 RX ADMIN — METHYLPREDNISOLONE SODIUM SUCCINATE 125 MG: 125 INJECTION, POWDER, FOR SOLUTION INTRAMUSCULAR; INTRAVENOUS at 17:52

## 2025-01-23 RX ADMIN — BUDESONIDE 0.5 MG: 0.5 INHALANT RESPIRATORY (INHALATION) at 20:30

## 2025-01-23 NOTE — LETTER
January 27, 2025      [address] AMBULATORY SERVICES  [address] [place] [address]  [phone]          Patient: Preston Wallis   YOB: 1965   Date of Visit: 1/23/2025       To Whom It May Concern:    Preston Wallis was seen at [address] SERVICES on 1/23/2025.    Please excuse  Elaine  from work today.        Sincerely,

## 2025-01-23 NOTE — Clinical Note
January 27, 2025      [address] AMBULATORY SERVICES  [address] [place] [address]  [phone]          Patient: Preston Wallis   YOB: 1965   Date of Visit: 1/23/2025       To Whom It May Concern:    Preston Wallis was seen at [address] SERVICES on 1/23/2025.    Please excuse {AMB PED EXCUSE CAREGIVER:20375} from work {AMB PED EXCUSE WHEN:20401}.        Sincerely,       No name on file

## 2025-01-23 NOTE — LETTER
January 27, 2025      [address] AMBULATORY SERVICES  [address] [place] [address]  [phone]          Patient: Preston Wallis   YOB: 1965   Date of Visit: 1/23/2025       To Whom It May Concern:    Preston Wallis was seen at [address] on 1/23/2025.    Please excuse        from work today.        Sincerely,

## 2025-01-23 NOTE — Clinical Note
January 27, 2025     Patient: Preston Wallis   YOB: 1965   Date of Visit: 1/23/2025       To Whom It May Concern:    It is my medical opinion that Preston Wallis {Work release (duty restriction):21963}.           Sincerely,        No name on file    CC: No Recipients

## 2025-01-23 NOTE — Clinical Note
January 27, 2025     Patient: Preston Wallis   YOB: 1965   Date of Visit: 1/23/2025       To Whom It May Concern:    It is my medical opinion that Preston Wallis {Work release (duty restriction):33283}.           Sincerely,        No name on file    CC: No Recipients

## 2025-01-23 NOTE — ED PROVIDER NOTES
"Time: 4:41 PM EST  Date of encounter:  1/23/2025  Independent Historian/Clinical History and Information was obtained by:   Patient    History is limited by: N/A    Chief Complaint: SOA      History of Present Illness:  Patient is a 59 y.o. year old male who presents to the emergency department for evaluation of shortness of breath.  Patient states he has been short of breath almost a week.  He states it worsened today.  Went to [facility] on Friday and was admitted 1/17/25/1/22/25.  He was discharged yesterday.  States he has pneumonia.  He states he is still having shortness of breath and wheezing.  Wears 2 L of oxygen at home at all times.  His home health nurse sent him to the ED. patient is currently on meropenem.  He states he used a breathing treatment PTA.  He is having productive cough of thick white sputum.  Denies leg swelling.      Patient Care Team  Primary Care Provider: Kimmy Riley MD    Past Medical History:     Allergies   Allergen Reactions    Benadryl [Diphenhydramine] Itching    Proventil [Albuterol] Other (See Comments)     Mouth sores       Past Medical History:   Diagnosis Date    Age-related cognitive decline     Allergic contact dermatitis     Allergies     Anemia     Bedbound     11/2023 \"MY LEG MUSCLES STOPPED WORKING\"    Bronchiectasis with acute lower respiratory infection     Charcot foot due to diabetes mellitus 09/10/2013    Chronic diastolic (congestive) heart failure     Chronic kidney disease     Chronic respiratory failure with hypoxia     Closed supracondylar fracture of femur 01/12/2022    COPD (chronic obstructive pulmonary disease)     Deep vein thrombosis (DVT) of lower extremity associated with air travel 01/13/2023    Dependence on supplemental oxygen     Eczema     Erectile dysfunction     due to organic reasons    Essential (primary) hypertension     Fracture     closed fracture of other tarsal and metatarsal bones    Fracture of proximal humerus 01/13/2023    GERD " without esophagitis     High risk medication use     Hypercholesteremia     Hypomagnesemia     Infected stasis ulcer of left lower extremity 01/13/2023    Insomnia     Low back pain     Major depressive disorder     Morbid (severe) obesity due to excess calories     MRSA pneumonia     Muscle weakness     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 without evidence of necrosis     Obstructive sleep apnea (adult) (pediatric)     On home O2     REPORTS WEARING 2L/NC AAT    Other forms of dyspnea     Other long term (current) drug therapy     Other specified noninfective gastroenteritis and colitis     Other spondylosis, lumbar region     Pain in both knees     Paroxysmal atrial fibrillation     Peripheral neuropathy     attributed to type 2 diabetes    Pneumonia, unspecified organism     Polyneuropathy     Rash and other nonspecific skin eruption     Syncope and collapse     Tachycardia     Tinnitus 01/13/2023    Type 1 diabetes mellitus with diabetic chronic kidney disease     Type 2 diabetes mellitus     Unspecified fall, initial encounter     Urinary retention      Past Surgical History:   Procedure Laterality Date    CARDIAC CATHETERIZATION Left 8/15/2024    Procedure: Carbon dioxide aortogram with left leg angiogram, possible angioplasty or stenting;  Surgeon: Moshe Willson MD;  Location: [facility] CATH INVASIVE LOCATION;  Service: Vascular;  Laterality: Left;    CHOLECYSTECTOMY      CYSTOSCOPY      FEMUR SURGERY Left     Shravan placed    KNEE SURGERY Left     OTHER SURGICAL HISTORY Left     venous port, REMOVED    PORTACATH PLACEMENT Right     TIBIAL PLATEAU OPEN REDUCTION INTERNAL FIXATION Left 12/22/2023    Procedure: TIBIAL PLATEAU OPEN REDUCTION INTERNAL FIXATION;  Surgeon: uHgo Kline MD;  Location: [facility] OR;  Service: Orthopedics;  Laterality: Left;    TONSILLECTOMY AND ADENOIDECTOMY       Family History   Problem Relation Age of Onset    Coronary artery  disease Mother     Hypertension Mother     Diabetes type II Mother     Asthma Father     Diabetes type II Sister     Cancer Sister        Home Medications:  Prior to Admission medications    Medication Sig Start Date End Date Taking? Authorizing Provider   arformoterol (BROVANA) 15 MCG/2ML nebulizer solution Take 2 mL by nebulization 2 (Two) Times a Day. 8/22/24   Betzaida Nelson APRN   Aspirin Low Dose 81 MG EC tablet TAKE 1 TABLET BY MOUTH EVERY MORNING 11/25/24   Kimmy Riley MD   atorvastatin (LIPITOR) 20 MG tablet TAKE 1 TABLET BY MOUTH EVERY NIGHT AT BEDTIME 11/25/24   Kimmy Riley MD   B-D UF III MINI PEN NEEDLES 31G X 5 MM misc USE FOUR TIMES DAILY BEFORE MEALS AND AT BEDTIME 12/3/24   Provider, MD Breann   budesonide (Pulmicort) 0.5 MG/2ML nebulizer solution Take 2 mL by nebulization 2 (Two) Times a Day. 8/22/24   Betzaida Nelson APRN   bumetanide (BUMEX) 2 MG tablet TAKE 1 TABLET BY MOUTH TWICE DAILY 11/25/24   Kimmy Riley MD   busPIRone (BUSPAR) 15 MG tablet TAKE 1 TABLET BY MOUTH TWICE DAILY 11/25/24   Kimmy Riley MD   calcium citrate (CALCITRATE) 950 (200 Ca) MG tablet TAKE 1 TABLET BY MOUTH EVERY NIGHT AT BEDTIME 11/25/24   Kimmy Riley MD   carvedilol (COREG) 25 MG tablet TAKE 1 TABLET BY MOUTH EVERY TWELVE HOURS 11/25/24   Kimmy Riley MD   Cholecalciferol (Vitamin D3) 50 MCG (2000 UT) tablet TAKE 1 TABLET BY MOUTH EVERY MORNING 11/25/24   Kimmy Riley MD   Continuous Glucose Sensor (FreeStyle Bethany 3 Plus Sensor) Use 2 each Every 30 (Thirty) Days. Apply as directed every 15 days 12/11/24   Neli Paredes APRN   docusate sodium (COLACE) 100 MG capsule TAKE 1 CAPSULE BY MOUTH TWICE DAILY 11/25/24   Kimmy Riley MD   DULoxetine (CYMBALTA) 60 MG capsule TAKE 1 CAPSULE BY MOUTH ONCE DAILY 12/30/24   Kimmy Riley MD   Eliquis 5 MG tablet tablet TAKE 1 TABLET BY MOUTH EVERY TWELVE HOURS 11/25/24   Kimmy Riley  MD Georgiana   famotidine (PEPCID) 40 MG tablet TAKE 1 TABLET BY MOUTH EVERY MORNING 11/25/24   Kimmy Riley MD   Farxiga 5 MG tablet tablet TAKE 1 TABLET BY MOUTH EVERY MORNING 11/25/24   Kimmy Riley MD   ferrous gluconate (FERGON) 324 MG tablet TAKE 1 TABLET BY MOUTH EVERY MORNING 11/25/24   Kimmy Riley MD   finasteride (PROSCAR) 5 MG tablet TAKE 1 TABLET BY MOUTH EVERY NIGHT AT BEDTIME 11/25/24   Kimmy Riley MD   folic acid (FOLVITE) 1 MG tablet TAKE 1 TABLET BY MOUTH EVERY NIGHT AT BEDTIME 11/25/24   Kimmy Riley MD   insulin detemir (Levemir) 100 UNIT/ML injection Inject 15 Units under the skin into the appropriate area as directed Every Night for 180 days. 12/11/24 6/9/25  Neli Paredes APRN   Insulin Lispro, 1 Unit Dial, (HUMALOG) 100 UNIT/ML solution pen-injector inject EIGHT units under the skin INTO THE APPROPRIATE AREA THREE TIMES DAILY BEFORE meals PER sliding scale 6/28/24   Kimmy Riley MD   ipratropium-albuterol (DUO-NEB) 0.5-2.5 mg/3 ml nebulizer Take 3 mL by nebulization Every 4 (Four) Hours As Needed for Wheezing or Shortness of Air. 8/22/24   Betzaida Nelson APRN   Isopropyl Myristate solution Apply 1 Application topically to the appropriate area as directed. 3/13/24   ProviderBreann MD   Ketoconazole 1 % shampoo Apply 10 mL topically Every 3 (Three) Days. 5/13/24   Kimmy Riley MD   magnesium oxide (MAG-OX) 400 MG tablet TAKE 1 TABLET BY MOUTH EVERY NIGHT AT BEDTIME 11/25/24   Kimmy Riley MD   montelukast (SINGULAIR) 10 MG tablet Take 1 tablet by mouth Every Night. 8/22/24   Betzaida Nelson APRN   Multiple Vitamins-Iron (GNP One Daily Plus Iron) tablet TAKE 1 TABLET BY MOUTH EVERY MORNING 11/25/24   Kimmy Riley MD   NIFEdipine XL (PROCARDIA XL) 30 MG 24 hr tablet TAKE 1 TABLET BY MOUTH EVERY MORNING 11/25/24   Kimmy Riley MD   O2 (OXYGEN) 2 Liter O2 - CONTINUOUS (route: Oxygen) 2/1/22    "Provider, MD Breann   Semaglutide, 1 MG/DOSE, (Ozempic, 1 MG/DOSE,) 4 MG/3ML solution pen-injector Inject 1 mg under the skin into the appropriate area as directed 1 (One) Time Per Week. 24   Neli Paredes APRN   tamsulosin (FLOMAX) 0.4 MG capsule 24 hr capsule TAKE 1 CAPSULE BY MOUTH EVERY NIGHT AT BEDTIME 24   Kimmy Riley MD   tiotropium (SPIRIVA) 18 MCG per inhalation capsule Place 1 capsule into inhaler and inhale Daily. 10/31/24   Martín Rivera MD   traZODone (DESYREL) 100 MG tablet Take 1.5 tablets by mouth Every Night. 24   Kimmy Riley MD   vitamin C (ASCORBIC ACID) 500 MG tablet TAKE 1 TABLET BY MOUTH TWICE DAILY 24   Kimmy Riley MD        Social History:   Social History     Tobacco Use    Smoking status: Former     Current packs/day: 0.00     Average packs/day: 1 pack/day for 12.0 years (12.0 ttl pk-yrs)     Types: Cigarettes     Start date:      Quit date:      Years since quittin.0     Passive exposure: Past    Smokeless tobacco: Never   Vaping Use    Vaping status: Never Used   Substance Use Topics    Alcohol use: Not Currently    Drug use: Never         Review of Systems:  Review of Systems   Constitutional: Negative.  Negative for fever.   HENT: Negative.     Eyes: Negative.    Respiratory:  Positive for cough, shortness of breath and wheezing.    Cardiovascular: Negative.  Negative for leg swelling.   Gastrointestinal: Negative.    Endocrine: Negative.    Genitourinary: Negative.    Musculoskeletal: Negative.    Skin: Negative.    Allergic/Immunologic: Negative.    Neurological: Negative.    Hematological: Negative.    Psychiatric/Behavioral: Negative.          Physical Exam:  /73   Pulse 90   Temp 97.6 °F (36.4 °C) (Oral)   Resp 20   Ht 175.3 cm (69\")   Wt 123 kg (270 lb 4.5 oz)   SpO2 100%   BMI 39.91 kg/m²     Physical Exam  Vitals and nursing note reviewed.   Constitutional:       Appearance: Normal " appearance. He is obese.   HENT:      Head: Normocephalic and atraumatic.      Right Ear: Tympanic membrane normal.      Left Ear: Tympanic membrane normal.      Nose: Nose normal.      Mouth/Throat:      Mouth: Mucous membranes are moist.   Eyes:      Extraocular Movements: Extraocular movements intact.      Conjunctiva/sclera: Conjunctivae normal.      Pupils: Pupils are equal, round, and reactive to light.   Cardiovascular:      Rate and Rhythm: Normal rate and regular rhythm.      Heart sounds: Normal heart sounds.   Pulmonary:      Effort: Pulmonary effort is normal. Tachypnea and accessory muscle usage present.      Breath sounds: Examination of the right-upper field reveals wheezing. Examination of the left-upper field reveals wheezing. Examination of the right-middle field reveals wheezing. Examination of the left-middle field reveals wheezing. Examination of the right-lower field reveals wheezing. Examination of the left-lower field reveals wheezing. Wheezing present.   Musculoskeletal:         General: Normal range of motion.      Cervical back: Normal range of motion and neck supple.   Skin:     General: Skin is warm and dry.      Coloration: Skin is not cyanotic.   Neurological:      General: No focal deficit present.      Mental Status: He is alert and oriented to person, place, and time.   Psychiatric:         Attention and Perception: Attention and perception normal.         Mood and Affect: Mood normal.         Behavior: Behavior normal.                  Medical Decision Making:      Comorbidities that affect care:    Sleep apnea, diabetes, Atrial Fibrillation, Chronic Kidney Disease, COPD, Obesity    External Notes reviewed:    Previous ED Note: Patient was seen at [place] [address] and admitted.  Respiratory testing showed new Pseudomonas, achromobacter bacteria and coronavirus.  He was discharged from a [facility] with 6 more doses of meropenem.  Patient had chest x-ray completed 1/17/2024  showing persistent diffuse airspace opacity in the upper and lower lung zones.  Cystic bronchiectasis is redemonstrated.  Small right pleural effusion and Previous Labs: CBC from 1/17/2025 showing WBC of 17.  On 1/18/2025 is elevated at 18.  On 1/21/2025 it was 18. Pneumonia panel PCR panel from 1/17/2025 for Pseudomonas and CTX M as well as positive for COVID      The following orders were placed and all results were independently analyzed by me:  Orders Placed This Encounter   Procedures    COVID-19, FLU A/B, RSV PCR 1 HR TAT - Swab, Nasopharynx    Blood Culture - Blood,    Blood Culture - Blood,    Respiratory Culture - Sputum, Cough    XR Chest 1 View    CT Chest Without Contrast Diagnostic    [place] Draw    Comprehensive Metabolic Panel    BNP    High Sensitivity Troponin T    CBC Auto Differential    Lactic Acid, Plasma    High Sensitivity Troponin T 1Hr    Urinalysis With Microscopic If Indicated (No Culture) - Urine, Catheter    Urinalysis, Microscopic Only - Urine, Clean Catch    Blood Gas, Arterial -    High Sensitivity Troponin T    Basic Metabolic Panel    Diet: Cardiac; No Salt Packet; Fluid Consistency: Thin (IDDSI 0)    Undress & Gown    Continuous Pulse Oximetry    Vital Signs    Vital Signs    Intake & Output    Weigh Patient    Oral Care    Maintain IV Access    Telemetry - Place Orders & Notify Provider of Results When Patient Experiences Acute Chest Pain, Dysrhythmia or Respiratory Distress    Code Status and Medical Interventions: CPR (Attempt to Resuscitate); Full Support    Inpatient Hospitalist Consult    Oxygen Therapy- Nasal Cannula; Titrate 1-6 LPM Per SpO2; 90 - 95%    POC Glucose Once    POC Glucose 4x Daily Before Meals & at Bedtime    ECG 12 Lead ED Triage Standing Order; SOA    Adult Transthoracic Echo Complete W/ Cont if Necessary Per Protocol    Wound Ostomy Eval & Treat    Insert Peripheral IV    Insert Peripheral IV    Inpatient Admission    CBC & Differential    Green Top (Gel)     Lavender Top    Gold Top - SST    Light Blue Top    CBC & Differential       Medications Given in the Emergency Department:  Medications   sodium chloride 0.9 % flush 10 mL (has no administration in time range)   ipratropium-albuterol (DUO-NEB) nebulizer solution 3 mL (has no administration in time range)   sodium chloride 0.9 % flush 10 mL (has no administration in time range)   sodium chloride 0.9 % flush 10 mL (has no administration in time range)   sodium chloride 0.9 % infusion 40 mL (has no administration in time range)   heparin (porcine) 5000 UNIT/ML injection 5,000 Units (has no administration in time range)   nitroglycerin (NITROSTAT) SL tablet 0.4 mg (has no administration in time range)   sennosides-docusate (PERICOLACE) 8.6-50 MG per tablet 2 tablet (has no administration in time range)     And   polyethylene glycol (MIRALAX) packet 17 g (has no administration in time range)     And   bisacodyl (DULCOLAX) EC tablet 5 mg (has no administration in time range)     And   bisacodyl (DULCOLAX) suppository 10 mg (has no administration in time range)   methylPREDNISolone sodium succinate (SOLU-Medrol) 40 mg in sterile water (preservative free) 1 mL (has no administration in time range)   budesonide (PULMICORT) nebulizer solution 0.5 mg (has no administration in time range)   ipratropium (ATROVENT) nebulizer solution 0.5 mg (has no administration in time range)   dextrose (GLUTOSE) oral gel 15 g (has no administration in time range)   dextrose (D50W) (25 g/50 mL) IV injection 25 g (has no administration in time range)   glucagon (GLUCAGEN) injection 1 mg (has no administration in time range)   Insulin Lispro (humaLOG) injection 2-7 Units (has no administration in time range)   meropenem (MERREM) 1,000 mg in sodium chloride 0.9 % 100 mL IVPB-VTB (has no administration in time range)   sodium chloride 0.9 % bolus 3,690 mL (3,690 mL Intravenous New Bag 1/23/25 8132)   piperacillin-tazobactam (ZOSYN) IVPB 3.375 g  IVPB in 100 mL NS (VTB) (0 g Intravenous Stopped 1/23/25 1834)   methylPREDNISolone sodium succinate (SOLU-Medrol) injection 125 mg (125 mg Intravenous Given 1/23/25 1752)        ED Course:    ED Course as of 01/23/25 2007   Thu Jan 23, 2025   1900 Discussed meropenem with Maria Isabel the pharmacist.  States his next dosage would be around midnight.  Patient had a pneumonia panel and was positive for ESBL and Pseudomonas [AJ]      ED Course User Index  [AJ] Jasper Cordova, LUCIAN       Labs:    Lab Results (last 24 hours)       Procedure Component Value Units Date/Time    CBC & Differential [[id number]]  (Abnormal) Collected: 01/23/25 1614    Specimen: Blood from Arm, Right Updated: 01/23/25 1622    Narrative:      The following orders were created for panel order CBC & Differential.  Procedure                               Abnormality         Status                     ---------                               -----------         ------                     CBC Auto Differential[[id number]]        Abnormal            Final result                 Please view results for these tests on the individual orders.    Comprehensive Metabolic Panel [[id number]]  (Abnormal) Collected: 01/23/25 1614    Specimen: Blood from Arm, Right Updated: 01/23/25 1643     Glucose 252 mg/dL      BUN 50 mg/dL      Creatinine 2.16 mg/dL      Sodium 135 mmol/L      Potassium 4.9 mmol/L      Chloride 94 mmol/L      CO2 30.4 mmol/L      Calcium 8.6 mg/dL      Total Protein 7.9 g/dL      Albumin 3.1 g/dL      ALT (SGPT) 42 U/L      AST (SGOT) 49 U/L      Alkaline Phosphatase 200 U/L      Total Bilirubin 0.2 mg/dL      Globulin 4.8 gm/dL      A/G Ratio 0.6 g/dL      BUN/Creatinine Ratio 23.1     Anion Gap 10.6 mmol/L      eGFR 34.4 mL/min/1.73     Narrative:      GFR Categories in Chronic Kidney Disease (CKD)      GFR Category          GFR (mL/min/1.73)    Interpretation  G1                     90 or greater         Normal or high (1)  G2                       60-89                Mild decrease (1)  G3a                   45-59                Mild to moderate decrease  G3b                   30-44                Moderate to severe decrease  G4                    15-29                Severe decrease  G5                    14 or less           Kidney failure          (1)In the absence of evidence of kidney disease, neither GFR category G1 or G2 fulfill the criteria for CKD.    eGFR calculation 2021 CKD-EPI creatinine equation, which does not include race as a factor    BNP [[id number]]  (Abnormal) Collected: 01/23/25 1614    Specimen: Blood from Arm, Right Updated: 01/23/25 1650     proBNP 1,193.0 pg/mL     Narrative:      This assay is used as an aid in the diagnosis of individuals suspected of having heart failure. It can be used as an aid in the diagnosis of acute decompensated heart failure (ADHF) in patients presenting with signs and symptoms of ADHF to the emergency department (ED). In addition, NT-proBNP of <300 pg/mL indicates ADHF is not likely.    Age Range Result Interpretation  NT-proBNP Concentration (pg/mL:      <50             Positive            >450                   Gray                 300-450                    Negative             <300    50-75           Positive            >900                  Gray                300-900                  Negative            <300      >75             Positive            >1800                  Gray                300-1800                  Negative            <300    High Sensitivity Troponin T [[id number]]  (Abnormal) Collected: 01/23/25 1614    Specimen: Blood from Arm, Right Updated: 01/23/25 1712     HS Troponin T 94 ng/L     Narrative:      High Sensitive Troponin T Reference Range:  <14.0 ng/L- Negative Female for AMI  <22.0 ng/L- Negative Male for AMI  >=14 - Abnormal Female indicating possible myocardial injury.  >=22 - Abnormal Male indicating possible myocardial injury.   Clinicians would have to utilize  clinical acumen, EKG, Troponin, and serial changes to determine if it is an Acute Myocardial Infarction or myocardial injury due to an underlying chronic condition.         CBC Auto Differential [[id number]]  (Abnormal) Collected: 01/23/25 1614    Specimen: Blood from Arm, Right Updated: 01/23/25 1622     WBC 24.28 10*3/mm3      RBC 3.29 10*6/mm3      Hemoglobin 9.0 g/dL      Hematocrit 30.2 %      MCV 91.8 fL      MCH 27.4 pg      MCHC 29.8 g/dL      RDW 14.0 %      RDW-SD 46.1 fl      MPV 8.3 fL      Platelets 419 10*3/mm3      Neutrophil % 87.9 %      Lymphocyte % 4.8 %      Monocyte % 5.3 %      Eosinophil % 0.9 %      Basophil % 0.2 %      Immature Grans % 0.9 %      Neutrophils, Absolute 21.34 10*3/mm3      Lymphocytes, Absolute 1.17 10*3/mm3      Monocytes, Absolute 1.28 10*3/mm3      Eosinophils, Absolute 0.22 10*3/mm3      Basophils, Absolute 0.05 10*3/mm3      Immature Grans, Absolute 0.22 10*3/mm3      nRBC 0.0 /100 WBC     COVID-19, FLU A/B, RSV PCR 1 HR TAT - Swab, Nasopharynx [[id number]]  (Normal) Collected: 01/23/25 1723    Specimen: Swab from Nasopharynx Updated: 01/23/25 1806     COVID19 Not Detected     Influenza A PCR Not Detected     Influenza B PCR Not Detected     RSV, PCR Not Detected    Narrative:      Fact sheet for providers: https://www.fda.gov/media/184316/download    Fact sheet for patients: https://www.fda.gov/media/918121/download    Test performed by PCR.    Blood Culture - Blood, Hand, Left [[id number]] Collected: 01/23/25 1735    Specimen: Blood from Hand, Left Updated: 01/23/25 1738    Lactic Acid, Plasma [[id number]]  (Normal) Collected: 01/23/25 1735    Specimen: Blood from Hand, Left Updated: 01/23/25 1802     Lactate 0.9 mmol/L     High Sensitivity Troponin T 1Hr [[id number]]  (Abnormal) Collected: 01/23/25 1735    Specimen: Blood from Hand, Left Updated: 01/23/25 1927     HS Troponin T 92 ng/L      Troponin T Numeric Delta -2 ng/L      Troponin T % Delta -2    Narrative:      High  Sensitive Troponin T Reference Range:  <14.0 ng/L- Negative Female for AMI  <22.0 ng/L- Negative Male for AMI  >=14 - Abnormal Female indicating possible myocardial injury.  >=22 - Abnormal Male indicating possible myocardial injury.   Clinicians would have to utilize clinical acumen, EKG, Troponin, and serial changes to determine if it is an Acute Myocardial Infarction or myocardial injury due to an underlying chronic condition.         Blood Culture - Blood, Arm, Right [[id number]] Collected: 01/23/25 1748    Specimen: Blood from Arm, Right Updated: 01/23/25 1751    Urinalysis With Microscopic If Indicated (No Culture) - Indwelling Urethral Catheter [[id number]]  (Abnormal) Collected: 01/23/25 1912    Specimen: Urine from Indwelling Urethral Catheter Updated: 01/23/25 1930     Color, UA Yellow     Appearance, UA Turbid     pH, UA 5.5     Specific Gravity, UA 1.017     Glucose, UA >=1000 mg/dL (3+)     Ketones, UA Negative     Bilirubin, UA Negative     Blood, UA Large (3+)     Protein, UA >=300 mg/dL (3+)     Leuk Esterase, UA Moderate (2+)     Nitrite, UA Negative     Urobilinogen, UA 0.2 E.U./dL    Urinalysis, Microscopic Only - Indwelling Urethral Catheter [[id number]]  (Abnormal) Collected: 01/23/25 1912    Specimen: Urine from Indwelling Urethral Catheter Updated: 01/23/25 1944     RBC, UA 3-5 /HPF      WBC, UA Too Numerous to Count /HPF      Bacteria, UA 3+ /HPF      Squamous Epithelial Cells, UA 3-6 /HPF      Yeast, UA Large/3+ Budding Yeast /HPF      Hyaline Casts, UA None Seen /LPF      Granular Casts, UA 3-6 /LPF      Methodology Manual Light Microscopy    POC Glucose Once [[id number]]  (Abnormal) Collected: 01/23/25 1955    Specimen: Blood Updated: 01/23/25 1957     Glucose 205 mg/dL      Comment: Serial Number: [id number]:  [id number]                Imaging:    XR Chest 1 View    Result Date: 1/23/2025  XR CHEST 1 VW Date of Exam: 1/23/2025 4:47 PM EST Indication: SOA Triage Protocol Comparison:  Chest AP dated 12/5/2024 Findings: There is an abandoned segment of a left subclavian central venous catheter measuring 17.3 cm in length. A right subclavian port infusion catheter terminates with the tip in the proximal superior vena cava. Patchy airspace opacities are again noted in the lung fields bilaterally, most predominantly in the mid to upper right lung field. Patient is rotated to the right. Heart size is favored to remain within normal limits. No definite effusion is seen.     Impression: Patchy chronic bilateral lung infiltrates. No acute infiltrate. Electronically Signed: Amado Salmeron MD  1/23/2025 5:11 PM EST  Workstation ID: [id number]       Differential Diagnosis and Discussion:    Dyspnea: Differential diagnosis includes but is not limited to metabolic acidosis, neurological disorders, psychogenic, asthma, pneumothorax, upper airway obstruction, COPD, pneumonia, noncardiogenic pulmonary edema, interstitial lung disease, anemia, congestive heart failure, and pulmonary embolism    PROCEDURES:    Labs were collected in the emergency department and all labs were reviewed and interpreted by me.  X-ray were performed in the emergency department and all X-ray impressions were independently interpreted by me.  An EKG was performed and the EKG was interpreted by me.  An EKG was performed and the EKG was interpreted by supervising attending.    ECG 12 Lead ED Triage Standing Order; SOA   Preliminary Result   HEART RATE=95  bpm   RR Ikdvtetz=973  ms   CT Grusgedz=755  ms   P Horizontal Axis=38  deg   P Front Axis=35  deg   QRSD Csbxkgdb=977  ms   QT Hoiimkgj=211  ms   SJqO=941  ms   QRS Axis=47  deg   T Wave Axis=190  deg   - ABNORMAL ECG -   Sinus rhythm   Nonspecific intraventricular conduction delay   Abnormal T, consider ischemia, lateral leads   When compared with ECG of 14-Apr-2024 19:09:06,   Significant rate increase   Significant repolarization change   Date and Time of Study:2025-01-23 16:07:10           Procedures    MDM           Total Critical Care time of 35 minutes. Total critical care time documented does not include time spent on separately billed procedures for services of nurses or physician assistants. I personally saw and examined the patient. I have reviewed all diagnostic interpretations and treatment plans as written. I was present for the key portions of any procedures performed and the inclusive time noted in any critical care statement. Critical care time includes patient management by me, time spent at the patients bedside,  time to review lab and imaging results, discussing patient care, documentation in the medical record, and time spent with family or caregiver.          Patient Care Considerations:    SEPSIS was considered but is NOT present in the emergency department as SIRS criteria is not present.      Consultants/Shared Management Plan:    Hospitalist: I have discussed the case with Dr. Collazo who agrees to accept the patient for admission.    Social Determinants of Health:    Patient is independent, reliable, and has access to care.       Disposition and Care Coordination:    Admit:   Through independent evaluation of the patient's history, physical, and imperical data, the patient meets criteria for inpatient admission to the hospital.        Final diagnoses:   Pneumonia due to Pseudomonas species, unspecified laterality, unspecified part of lung   Urinary tract infection associated with indwelling urethral catheter, initial encounter        ED Disposition       ED Disposition   Decision to Admit    Condition   --    Comment   Level of Care: Telemetry [5]   Diagnosis: PNA (pneumonia) [027514]   Admitting Physician: CARMEN COLLAZO [[id number]]   Certification: I Certify That Inpatient Hospital Services Are Medically Necessary For Greater Than 2 Midnights                 This medical record created using voice recognition software.             Jasper Cordova PA-C  01/23/25  2007

## 2025-01-23 NOTE — LETTER
January 27, 2025      [address] [facility]  [address] [place] [address]  [phone]          Patient: Preston Wallis   YOB: 1965   Date of Visit: 1/23/2025       To Whom It May Concern:    Preston Wallis was seen at [address] [facility] on 1/23/2025.    Please excuse  Elaine Zaldivar  from work today.        Sincerely,

## 2025-01-23 NOTE — Clinical Note
January 27, 2025     Patient: Preston Wallis   YOB: 1965   Date of Visit: 1/23/2025       To Whom It May Concern:    It is my medical opinion that Preston Wallis {Work release (duty restriction):[id number]}.           Sincerely,        No name on file    CC: No Recipients

## 2025-01-24 ENCOUNTER — TELEPHONE (OUTPATIENT)
Dept: FAMILY MEDICINE CLINIC | Age: 60
End: 2025-01-24
Payer: COMMERCIAL

## 2025-01-24 ENCOUNTER — APPOINTMENT (OUTPATIENT)
Dept: CT IMAGING | Facility: HOSPITAL | Age: 60
End: 2025-01-24
Payer: COMMERCIAL

## 2025-01-24 LAB
ANION GAP SERPL CALCULATED.3IONS-SCNC: 11.2 MMOL/L (ref 5–15)
AV MEAN PRESS GRAD SYS DOP V1V2: 4 MMHG
AV VMAX SYS DOP: 138 CM/SEC
BASOPHILS # BLD AUTO: 0.02 10*3/MM3 (ref 0–0.2)
BASOPHILS NFR BLD AUTO: 0.1 % (ref 0–1.5)
BH CV ECHO MEAS - AO MAX PG: 7.6 MMHG
BH CV ECHO MEAS - AO ROOT DIAM: 2.7 CM
BH CV ECHO MEAS - AO V2 VTI: 24.1 CM
BH CV ECHO MEAS - AVA(I,D): 2.5 CM2
BH CV ECHO MEAS - EDV(CUBED): 85.2 ML
BH CV ECHO MEAS - EDV(MOD-SP2): 96 ML
BH CV ECHO MEAS - EDV(MOD-SP4): 96 ML
BH CV ECHO MEAS - EF(MOD-SP2): 48.2 %
BH CV ECHO MEAS - EF(MOD-SP4): 50.7 %
BH CV ECHO MEAS - ESV(CUBED): 35.9 ML
BH CV ECHO MEAS - ESV(MOD-SP2): 49.7 ML
BH CV ECHO MEAS - ESV(MOD-SP4): 47.3 ML
BH CV ECHO MEAS - FS: 25 %
BH CV ECHO MEAS - IVS/LVPW: 1 CM
BH CV ECHO MEAS - IVSD: 1.3 CM
BH CV ECHO MEAS - LA DIMENSION: 3.3 CM
BH CV ECHO MEAS - LAT PEAK E' VEL: 8.4 CM/SEC
BH CV ECHO MEAS - LV DIASTOLIC VOL/BSA (35-75): 40.9 CM2
BH CV ECHO MEAS - LV MASS(C)D: 215.1 GRAMS
BH CV ECHO MEAS - LV MAX PG: 5 MMHG
BH CV ECHO MEAS - LV MEAN PG: 3 MMHG
BH CV ECHO MEAS - LV SYSTOLIC VOL/BSA (12-30): 20.2 CM2
BH CV ECHO MEAS - LV V1 MAX: 112 CM/SEC
BH CV ECHO MEAS - LV V1 VTI: 19.3 CM
BH CV ECHO MEAS - LVIDD: 4.4 CM
BH CV ECHO MEAS - LVIDS: 3.3 CM
BH CV ECHO MEAS - LVOT AREA: 3.1 CM2
BH CV ECHO MEAS - LVOT DIAM: 2 CM
BH CV ECHO MEAS - LVPWD: 1.3 CM
BH CV ECHO MEAS - MED PEAK E' VEL: 7.7 CM/SEC
BH CV ECHO MEAS - MV A MAX VEL: 105 CM/SEC
BH CV ECHO MEAS - MV DEC SLOPE: 426 CM/SEC2
BH CV ECHO MEAS - MV DEC TIME: 0.17 SEC
BH CV ECHO MEAS - MV E MAX VEL: 73.3 CM/SEC
BH CV ECHO MEAS - MV E/A: 0.7
BH CV ECHO MEAS - MV MEAN PG: 2 MMHG
BH CV ECHO MEAS - MV V2 VTI: 18.8 CM
BH CV ECHO MEAS - MVA(VTI): 3.2 CM2
BH CV ECHO MEAS - RVDD: 2.7 CM
BH CV ECHO MEAS - SV(LVOT): 60.6 ML
BH CV ECHO MEAS - SV(MOD-SP2): 46.3 ML
BH CV ECHO MEAS - SV(MOD-SP4): 48.7 ML
BH CV ECHO MEAS - SVI(LVOT): 25.8 ML/M2
BH CV ECHO MEAS - SVI(MOD-SP2): 19.7 ML/M2
BH CV ECHO MEAS - SVI(MOD-SP4): 20.8 ML/M2
BH CV ECHO MEAS - TAPSE (>1.6): 1.27 CM
BH CV ECHO MEASUREMENTS AVERAGE E/E' RATIO: 9.11
BUN SERPL-MCNC: 51 MG/DL (ref 6–20)
BUN/CREAT SERPL: 23.8 (ref 7–25)
CALCIUM SPEC-SCNC: 8 MG/DL (ref 8.6–10.5)
CHLORIDE SERPL-SCNC: 99 MMOL/L (ref 98–107)
CO2 SERPL-SCNC: 25.8 MMOL/L (ref 22–29)
CREAT SERPL-MCNC: 2.14 MG/DL (ref 0.76–1.27)
CRP SERPL-MCNC: 7.03 MG/DL (ref 0–0.5)
DEPRECATED RDW RBC AUTO: 46.6 FL (ref 37–54)
EGFRCR SERPLBLD CKD-EPI 2021: 34.8 ML/MIN/1.73
EOSINOPHIL # BLD AUTO: 0 10*3/MM3 (ref 0–0.4)
EOSINOPHIL NFR BLD AUTO: 0 % (ref 0.3–6.2)
ERYTHROCYTE [DISTWIDTH] IN BLOOD BY AUTOMATED COUNT: 14 % (ref 12.3–15.4)
ERYTHROCYTE [SEDIMENTATION RATE] IN BLOOD: 102 MM/HR (ref 0–20)
GLUCOSE BLDC GLUCOMTR-MCNC: 222 MG/DL (ref 70–99)
GLUCOSE BLDC GLUCOMTR-MCNC: 266 MG/DL (ref 70–99)
GLUCOSE BLDC GLUCOMTR-MCNC: 270 MG/DL (ref 70–99)
GLUCOSE BLDC GLUCOMTR-MCNC: 284 MG/DL (ref 70–99)
GLUCOSE SERPL-MCNC: 301 MG/DL (ref 65–99)
HCT VFR BLD AUTO: 29.3 % (ref 37.5–51)
HGB BLD-MCNC: 8.6 G/DL (ref 13–17.7)
IMM GRANULOCYTES # BLD AUTO: 0.18 10*3/MM3 (ref 0–0.05)
IMM GRANULOCYTES NFR BLD AUTO: 1.1 % (ref 0–0.5)
L PNEUMO1 AG UR QL IA: NEGATIVE
LEFT ATRIUM VOLUME INDEX: 19.7 ML/M2
LV EF BIPLANE MOD: 49.4 %
LYMPHOCYTES # BLD AUTO: 0.93 10*3/MM3 (ref 0.7–3.1)
LYMPHOCYTES NFR BLD AUTO: 5.5 % (ref 19.6–45.3)
MCH RBC QN AUTO: 27.1 PG (ref 26.6–33)
MCHC RBC AUTO-ENTMCNC: 29.4 G/DL (ref 31.5–35.7)
MCV RBC AUTO: 92.4 FL (ref 79–97)
MONOCYTES # BLD AUTO: 0.33 10*3/MM3 (ref 0.1–0.9)
MONOCYTES NFR BLD AUTO: 2 % (ref 5–12)
NEUTROPHILS NFR BLD AUTO: 15.43 10*3/MM3 (ref 1.7–7)
NEUTROPHILS NFR BLD AUTO: 91.3 % (ref 42.7–76)
NRBC BLD AUTO-RTO: 0 /100 WBC (ref 0–0.2)
PLATELET # BLD AUTO: 405 10*3/MM3 (ref 140–450)
PMV BLD AUTO: 8.7 FL (ref 6–12)
POTASSIUM SERPL-SCNC: 4.8 MMOL/L (ref 3.5–5.2)
RBC # BLD AUTO: 3.17 10*6/MM3 (ref 4.14–5.8)
S PNEUM AG SPEC QL LA: NEGATIVE
SODIUM SERPL-SCNC: 136 MMOL/L (ref 136–145)
TROPONIN T SERPL HS-MCNC: 77 NG/L
WBC NRBC COR # BLD AUTO: 16.89 10*3/MM3 (ref 3.4–10.8)

## 2025-01-24 PROCEDURE — 87116 MYCOBACTERIA CULTURE: CPT | Performed by: STUDENT IN AN ORGANIZED HEALTH CARE EDUCATION/TRAINING PROGRAM

## 2025-01-24 PROCEDURE — 71250 CT THORAX DX C-: CPT

## 2025-01-24 PROCEDURE — 25010000002 METHYLPREDNISOLONE PER 40 MG: Performed by: STUDENT IN AN ORGANIZED HEALTH CARE EDUCATION/TRAINING PROGRAM

## 2025-01-24 PROCEDURE — 85652 RBC SED RATE AUTOMATED: CPT | Performed by: STUDENT IN AN ORGANIZED HEALTH CARE EDUCATION/TRAINING PROGRAM

## 2025-01-24 PROCEDURE — 63710000001 INSULIN GLARGINE PER 5 UNITS: Performed by: STUDENT IN AN ORGANIZED HEALTH CARE EDUCATION/TRAINING PROGRAM

## 2025-01-24 PROCEDURE — 87186 SC STD MICRODIL/AGAR DIL: CPT

## 2025-01-24 PROCEDURE — 82948 REAGENT STRIP/BLOOD GLUCOSE: CPT | Performed by: STUDENT IN AN ORGANIZED HEALTH CARE EDUCATION/TRAINING PROGRAM

## 2025-01-24 PROCEDURE — 63710000001 INSULIN LISPRO (HUMAN) PER 5 UNITS: Performed by: STUDENT IN AN ORGANIZED HEALTH CARE EDUCATION/TRAINING PROGRAM

## 2025-01-24 PROCEDURE — 99223 1ST HOSP IP/OBS HIGH 75: CPT | Performed by: INTERNAL MEDICINE

## 2025-01-24 PROCEDURE — 80048 BASIC METABOLIC PNL TOTAL CA: CPT | Performed by: STUDENT IN AN ORGANIZED HEALTH CARE EDUCATION/TRAINING PROGRAM

## 2025-01-24 PROCEDURE — 85025 COMPLETE CBC W/AUTO DIFF WBC: CPT | Performed by: STUDENT IN AN ORGANIZED HEALTH CARE EDUCATION/TRAINING PROGRAM

## 2025-01-24 PROCEDURE — 87186 SC STD MICRODIL/AGAR DIL: CPT | Performed by: STUDENT IN AN ORGANIZED HEALTH CARE EDUCATION/TRAINING PROGRAM

## 2025-01-24 PROCEDURE — 25010000002 MEROPENEM PER 100 MG: Performed by: STUDENT IN AN ORGANIZED HEALTH CARE EDUCATION/TRAINING PROGRAM

## 2025-01-24 PROCEDURE — 94664 DEMO&/EVAL PT USE INHALER: CPT

## 2025-01-24 PROCEDURE — 87070 CULTURE OTHR SPECIMN AEROBIC: CPT | Performed by: STUDENT IN AN ORGANIZED HEALTH CARE EDUCATION/TRAINING PROGRAM

## 2025-01-24 PROCEDURE — 87206 SMEAR FLUORESCENT/ACID STAI: CPT | Performed by: STUDENT IN AN ORGANIZED HEALTH CARE EDUCATION/TRAINING PROGRAM

## 2025-01-24 PROCEDURE — 87077 CULTURE AEROBIC IDENTIFY: CPT | Performed by: STUDENT IN AN ORGANIZED HEALTH CARE EDUCATION/TRAINING PROGRAM

## 2025-01-24 PROCEDURE — 63710000001 REVEFENACIN 175 MCG/3ML SOLUTION: Performed by: STUDENT IN AN ORGANIZED HEALTH CARE EDUCATION/TRAINING PROGRAM

## 2025-01-24 PROCEDURE — 86140 C-REACTIVE PROTEIN: CPT | Performed by: STUDENT IN AN ORGANIZED HEALTH CARE EDUCATION/TRAINING PROGRAM

## 2025-01-24 PROCEDURE — 87205 SMEAR GRAM STAIN: CPT | Performed by: STUDENT IN AN ORGANIZED HEALTH CARE EDUCATION/TRAINING PROGRAM

## 2025-01-24 PROCEDURE — 84484 ASSAY OF TROPONIN QUANT: CPT | Performed by: STUDENT IN AN ORGANIZED HEALTH CARE EDUCATION/TRAINING PROGRAM

## 2025-01-24 PROCEDURE — 94799 UNLISTED PULMONARY SVC/PX: CPT

## 2025-01-24 PROCEDURE — 99232 SBSQ HOSP IP/OBS MODERATE 35: CPT | Performed by: STUDENT IN AN ORGANIZED HEALTH CARE EDUCATION/TRAINING PROGRAM

## 2025-01-24 PROCEDURE — 36415 COLL VENOUS BLD VENIPUNCTURE: CPT | Performed by: STUDENT IN AN ORGANIZED HEALTH CARE EDUCATION/TRAINING PROGRAM

## 2025-01-24 PROCEDURE — 82948 REAGENT STRIP/BLOOD GLUCOSE: CPT

## 2025-01-24 RX ORDER — ARFORMOTEROL TARTRATE 15 UG/2ML
15 SOLUTION RESPIRATORY (INHALATION)
Status: DISCONTINUED | OUTPATIENT
Start: 2025-01-24 | End: 2025-02-20 | Stop reason: HOSPADM

## 2025-01-24 RX ORDER — IPRATROPIUM BROMIDE AND ALBUTEROL SULFATE 2.5; .5 MG/3ML; MG/3ML
3 SOLUTION RESPIRATORY (INHALATION) EVERY 4 HOURS PRN
Status: DISCONTINUED | OUTPATIENT
Start: 2025-01-24 | End: 2025-02-20 | Stop reason: HOSPADM

## 2025-01-24 RX ORDER — TOBRAMYCIN INHALATION SOLUTION 300 MG/5ML
300 INHALANT RESPIRATORY (INHALATION)
Status: DISCONTINUED | OUTPATIENT
Start: 2025-01-24 | End: 2025-02-20 | Stop reason: HOSPADM

## 2025-01-24 RX ORDER — FINASTERIDE 5 MG/1
5 TABLET, FILM COATED ORAL DAILY
Status: DISCONTINUED | OUTPATIENT
Start: 2025-01-24 | End: 2025-02-20 | Stop reason: HOSPADM

## 2025-01-24 RX ADMIN — WATER 40 MG: 1 INJECTION INTRAMUSCULAR; INTRAVENOUS; SUBCUTANEOUS at 09:14

## 2025-01-24 RX ADMIN — Medication 10 ML: at 09:13

## 2025-01-24 RX ADMIN — INSULIN LISPRO 4 UNITS: 100 INJECTION, SOLUTION INTRAVENOUS; SUBCUTANEOUS at 12:31

## 2025-01-24 RX ADMIN — CARVEDILOL 25 MG: 25 TABLET, FILM COATED ORAL at 09:13

## 2025-01-24 RX ADMIN — INSULIN LISPRO 3 UNITS: 100 INJECTION, SOLUTION INTRAVENOUS; SUBCUTANEOUS at 16:46

## 2025-01-24 RX ADMIN — WATER 40 MG: 1 INJECTION INTRAMUSCULAR; INTRAVENOUS; SUBCUTANEOUS at 18:48

## 2025-01-24 RX ADMIN — INSULIN LISPRO 4 UNITS: 100 INJECTION, SOLUTION INTRAVENOUS; SUBCUTANEOUS at 09:30

## 2025-01-24 RX ADMIN — BUDESONIDE 0.5 MG: 0.5 INHALANT RESPIRATORY (INHALATION) at 10:38

## 2025-01-24 RX ADMIN — FERROUS SULFATE TAB 325 MG (65 MG ELEMENTAL FE) 325 MG: 325 (65 FE) TAB at 12:08

## 2025-01-24 RX ADMIN — BUMETANIDE 2 MG: 1 TABLET ORAL at 20:29

## 2025-01-24 RX ADMIN — TOBRAMYCIN 300 MG: 300 SOLUTION RESPIRATORY (INHALATION) at 14:07

## 2025-01-24 RX ADMIN — BUSPIRONE HYDROCHLORIDE 15 MG: 5 TABLET ORAL at 20:30

## 2025-01-24 RX ADMIN — FINASTERIDE 5 MG: 5 TABLET, FILM COATED ORAL at 14:32

## 2025-01-24 RX ADMIN — GUAIFENESIN 600 MG: 600 TABLET ORAL at 09:11

## 2025-01-24 RX ADMIN — APIXABAN 5 MG: 5 TABLET, FILM COATED ORAL at 20:29

## 2025-01-24 RX ADMIN — INSULIN LISPRO 4 UNITS: 100 INJECTION, SOLUTION INTRAVENOUS; SUBCUTANEOUS at 20:30

## 2025-01-24 RX ADMIN — Medication 10 ML: at 12:09

## 2025-01-24 RX ADMIN — REVEFENACIN 175 MCG: 175 SOLUTION RESPIRATORY (INHALATION) at 10:38

## 2025-01-24 RX ADMIN — APIXABAN 5 MG: 5 TABLET, FILM COATED ORAL at 09:11

## 2025-01-24 RX ADMIN — COLLAGENASE SANTYL 1 APPLICATION: 250 OINTMENT TOPICAL at 21:21

## 2025-01-24 RX ADMIN — COLLAGENASE SANTYL 1 APPLICATION: 250 OINTMENT TOPICAL at 17:09

## 2025-01-24 RX ADMIN — Medication 10 ML: at 18:49

## 2025-01-24 RX ADMIN — FAMOTIDINE 40 MG: 20 TABLET ORAL at 09:10

## 2025-01-24 RX ADMIN — ARFORMOTEROL TARTRATE 15 MCG: 15 SOLUTION RESPIRATORY (INHALATION) at 14:07

## 2025-01-24 RX ADMIN — IPRATROPIUM BROMIDE 0.5 MG: 0.5 SOLUTION RESPIRATORY (INHALATION) at 12:32

## 2025-01-24 RX ADMIN — WATER 40 MG: 1 INJECTION INTRAMUSCULAR; INTRAVENOUS; SUBCUTANEOUS at 01:47

## 2025-01-24 RX ADMIN — TAMSULOSIN HYDROCHLORIDE 0.4 MG: 0.4 CAPSULE ORAL at 09:11

## 2025-01-24 RX ADMIN — DULOXETINE HYDROCHLORIDE 60 MG: 30 CAPSULE, DELAYED RELEASE ORAL at 09:10

## 2025-01-24 RX ADMIN — ARFORMOTEROL TARTRATE 15 MCG: 15 SOLUTION RESPIRATORY (INHALATION) at 19:41

## 2025-01-24 RX ADMIN — INSULIN GLARGINE 15 UNITS: 100 INJECTION, SOLUTION SUBCUTANEOUS at 20:30

## 2025-01-24 RX ADMIN — TRAZODONE HYDROCHLORIDE 150 MG: 100 TABLET ORAL at 20:29

## 2025-01-24 RX ADMIN — TOBRAMYCIN 300 MG: 300 SOLUTION RESPIRATORY (INHALATION) at 19:59

## 2025-01-24 RX ADMIN — BUSPIRONE HYDROCHLORIDE 15 MG: 5 TABLET ORAL at 09:10

## 2025-01-24 RX ADMIN — BUMETANIDE 2 MG: 1 TABLET ORAL at 09:12

## 2025-01-24 RX ADMIN — ASPIRIN 81 MG: 81 TABLET, COATED ORAL at 09:12

## 2025-01-24 RX ADMIN — ATORVASTATIN CALCIUM 20 MG: 10 TABLET, FILM COATED ORAL at 09:10

## 2025-01-24 RX ADMIN — MEROPENEM 1000 MG: 1 INJECTION INTRAVENOUS at 12:09

## 2025-01-24 RX ADMIN — NIFEDIPINE 30 MG: 30 TABLET, FILM COATED, EXTENDED RELEASE ORAL at 09:12

## 2025-01-24 RX ADMIN — BUDESONIDE 0.5 MG: 0.5 INHALANT RESPIRATORY (INHALATION) at 19:40

## 2025-01-24 RX ADMIN — GUAIFENESIN 600 MG: 600 TABLET ORAL at 20:31

## 2025-01-24 RX ADMIN — Medication 10 ML: at 20:30

## 2025-01-24 RX ADMIN — CARVEDILOL 25 MG: 25 TABLET, FILM COATED ORAL at 20:30

## 2025-01-24 RX ADMIN — MONTELUKAST 10 MG: 10 TABLET, FILM COATED ORAL at 20:30

## 2025-01-24 NOTE — PROGRESS NOTES
CPAP is ordered for patient has sleep apnea. Patient stated he has claustrophobia and can't tolerate the mask and won't wear the unit.

## 2025-01-24 NOTE — CASE MANAGEMENT/SOCIAL WORK
Mr. Wallis was prescribed Astral noninvasive ventilator for chronic respiratory failure secondary to COPD in April of 2022. Patient returned ventilator last July AMA.     Mr. Wallis has a history of pseudomonas pneumonia for which he was prescribed Tobramycin nebulizer 30 days on 30 days off. He was also prescribed Smartvest for airway clearance/bronchiectasis last April and should be using device twice daily.

## 2025-01-24 NOTE — CONSULTS
Pulmonary / Critical Care Consult Note      Patient Name: Preston Wallis  : 1965  MRN: [id number]  Primary Care Physician:  Kimmy Riley MD  Referring Physician: No ref. provider found  Date of admission: 2025    Subjective   Subjective     Reason for Consult/ Chief Complaint: Bronchiectasis with acute exacerbation, likely persistent MDRO Pseudomonas, Achromobacter and coronavirus pneumonia    HPI:  Preston Wallis is a 59 y.o. male with history of severe COPD with FEV1 26% of predicted, obesity with BMI of 36, CHF, recurrent Pseudomonas infection with MDRO Pseudomonas, history of PE in 2019 on Xarelto and tobacco abuse, obstructive sleep apnea on home NIPPV, morbid obesity and cognitive decline.  He recently was admitted to [facility] with MDRO Pseudomonas, Achromobacter and coronavirus pneumonia.  He was treated with IV meropenem and was discharged home on ertapenem.  He was requiring 2 L oxygen at discharge.  However he had oxygen requirement that went up and came to the hospital.  He is admitted for bronchiectasis with acute exacerbation.  Pulm and critical services consulted for extensive management of his acute issues.  He has history of smoking distant past but quit smoking many years ago.  He has cystic bronchiectasis, hide chest vest at home and has been on nebulizers including Brovana, Pulmicort, Yupelri and as needed DuoNeb.  He has also been on MOIZ neb off-and-on cycle.  He is on oxygen at 2 L/min at home.  He continues to have cough, shortness of breath, wheezing and chest tightness.  He had pleural effusion in the past and had thoracentesis.  However, CT scan does not show any fluid at this time.    Review of Systems  General:  Fatigue, No Fever  HEENT: No dysphagia, No Visual Changes, no rhinorrhea  Respiratory:  + Productive cough,+Dyspnea, thick mucoid phlegm, No Pleuritic Pain, + wheezing, no hemoptysis  Cardiovascular: Denies chest pain, denies palpitations,+FREITAS, No Chest  "Pressure  Gastrointestinal:  No Abdominal Pain, No Nausea, No Vomiting, No Diarrhea  Genitourinary:  No Dysuria, No Frequency, No Hesitancy  Musculoskeletal: No muscle pain or swelling  Endocrine:  No Heat Intolerance, No Cold Intolerance  Hematologic:  No Bleeding, No Bruising  Psychiatric:  No Anxiety, No Depression  Neurologic:  No Confusion, no Dysarthria, No Headaches  Skin:  No Rash, No Open Wounds        Personal History     Past Medical History:   Diagnosis Date   • Age-related cognitive decline    • Allergic contact dermatitis    • Allergies    • Anemia    • Bedbound     11/2023 \"MY LEG MUSCLES STOPPED WORKING\"   • Bronchiectasis with acute lower respiratory infection    • Charcot foot due to diabetes mellitus 09/10/2013   • Chronic diastolic (congestive) heart failure    • Chronic kidney disease    • Chronic respiratory failure with hypoxia    • Closed supracondylar fracture of femur 01/12/2022   • COPD (chronic obstructive pulmonary disease)    • Deep vein thrombosis (DVT) of lower extremity associated with air travel 01/13/2023   • Dependence on supplemental oxygen    • Eczema    • Erectile dysfunction     due to organic reasons   • Essential (primary) hypertension    • Fracture     closed fracture of other tarsal and metatarsal bones   • Fracture of proximal humerus 01/13/2023   • GERD without esophagitis    • High risk medication use    • Hypercholesteremia    • Hypomagnesemia    • Infected stasis ulcer of left lower extremity 01/13/2023   • Insomnia    • Low back pain    • Major depressive disorder    • Morbid (severe) obesity due to excess calories    • MRSA pneumonia    • Muscle weakness    • 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 without evidence of necrosis    • Obstructive sleep apnea (adult) (pediatric)    • On home O2     REPORTS WEARING 2L/NC AAT   • Other forms of dyspnea    • Other long term (current) drug therapy    • Other specified noninfective " gastroenteritis and colitis    • Other spondylosis, lumbar region    • Pain in both knees    • Paroxysmal atrial fibrillation    • Peripheral neuropathy     attributed to type 2 diabetes   • Pneumonia, unspecified organism    • Polyneuropathy    • Rash and other nonspecific skin eruption    • Self-catheterizes urinary bladder     EVERY 4 HOURS   • Syncope and collapse    • Tachycardia    • Tinnitus 01/13/2023   • Type 1 diabetes mellitus with diabetic chronic kidney disease    • Type 2 diabetes mellitus    • Unspecified fall, initial encounter    • Urinary retention        Past Surgical History:   Procedure Laterality Date   • CARDIAC CATHETERIZATION Left 8/15/2024    Procedure: Carbon dioxide aortogram with left leg angiogram, possible angioplasty or stenting;  Surgeon: Moshe Willson MD;  Location: [facility] INVASIVE LOCATION;  Service: Vascular;  Laterality: Left;   • CHOLECYSTECTOMY     • CYSTOSCOPY     • FEMUR SURGERY Left     Shravan placed   • KNEE SURGERY Left    • OTHER SURGICAL HISTORY Left     venous port, REMOVED   • PORTACATH PLACEMENT Right    • TIBIAL PLATEAU OPEN REDUCTION INTERNAL FIXATION Left 12/22/2023    Procedure: TIBIAL PLATEAU OPEN REDUCTION INTERNAL FIXATION;  Surgeon: Hugo Kline MD;  Location: [facility] OR;  Service: Orthopedics;  Laterality: Left;   • TONSILLECTOMY AND ADENOIDECTOMY         Family History: family history includes Asthma in his father; Cancer in his sister; Coronary artery disease in his mother; Diabetes type II in his mother and sister; Hypertension in his mother.     Social History:  reports that he quit smoking about 32 years ago. His smoking use included cigarettes. He started smoking about 44 years ago. He has a 12 pack-year smoking history. He has been exposed to tobacco smoke. He has never used smokeless tobacco. He reports that he does not currently use alcohol. He reports that he does not use drugs.    Home Medications:  DULoxetine, FreeStyle Bethany 3  Plus Sensor, GNP One Daily Plus Iron, Insulin Lispro (1 Unit Dial), Magnesium Oxide -Mg Supplement, NIFEdipine XL, O2, Semaglutide (1 MG/DOSE), Vitamin D3, apixaban, arformoterol, aspirin, atorvastatin, budesonide, bumetanide, busPIRone, calcium citrate, carvedilol, dapagliflozin, docusate sodium, famotidine, ferrous gluconate, finasteride, folic acid, insulin detemir, ipratropium-albuterol, meropenem, montelukast, predniSONE, saccharomyces boulardii, tamsulosin, tiotropium, tobramycin PF, traZODone, and vitamin C    Allergies:  Allergies   Allergen Reactions   • Benadryl [Diphenhydramine] Itching   • Proventil [Albuterol] Other (See Comments)     Mouth sores         Objective    Objective     Vitals:   Temp:  [97.6 °F (36.4 °C)-98.6 °F (37 °C)] 98 °F (36.7 °C)  Heart Rate:  [74-95] 92  Resp:  [14-20] 18  BP: (134-199)/(60-93) 156/69  Flow (L/min) (Oxygen Therapy):  [1-6] 2    Physical Exam:  Vital Signs Reviewed   General: Morbidly obese male, in mild distress, on nasal oxygen, has conversational dyspnea  HEENT:  PERRL, EOMI.  OP, nares clear, no sinus tenderness  Neck:  Supple, no JVD, no thyromegaly  Lymph: no axillary, cervical, supraclavicular lymphadenopathy noted bilaterally  Chest: Poor aeration, expiratory wheezing, scattered rhonchi, resonant to percussion bilaterally, mild increased work of breathing  CV: RRR, no MGR, pulses 2+, equal.  Abd:  Soft, NT, ND, + BS, no HSM  EXT:  no clubbing, no cyanosis, no edema, no joint tenderness  Neuro:  A&Ox3, CN grossly intact, no focal deficits.  Skin: No rashes or lesions noted      Result Review    Result Review:  I have personally reviewed the results from the time of this admission to 1/24/2025 15:19 EST and agree with these findings:  [x]  Laboratory  [x]  Microbiology  [x]  Radiology  [x]  EKG/Telemetry   [x]  Cardiology/Vascular   []  Pathology  []  Old records  []  Other:  Most notable findings include:         Lab 01/24/25  0534 01/23/25  1614   WBC 16.89*  24.28*   HEMOGLOBIN 8.6* 9.0*   HEMATOCRIT 29.3* 30.2*   PLATELETS 405 419   SODIUM 136 135*   POTASSIUM 4.8 4.9   CHLORIDE 99 94*   CO2 25.8 30.4*   BUN 51* 50*   CREATININE 2.14* 2.16*   GLUCOSE 301* 252*   CALCIUM 8.0* 8.6   TOTAL PROTEIN  --  7.9   ALBUMIN  --  3.1*   GLOBULIN  --  4.8     XR Chest 1 View    Result Date: 1/23/2025  XR CHEST 1 VW Date of Exam: 1/23/2025 4:47 PM EST Indication: SOA Triage Protocol Comparison: Chest AP dated 12/5/2024 Findings: There is an abandoned segment of a left subclavian central venous catheter measuring 17.3 cm in length. A right subclavian port infusion catheter terminates with the tip in the proximal superior vena cava. Patchy airspace opacities are again noted in the lung fields bilaterally, most predominantly in the mid to upper right lung field. Patient is rotated to the right. Heart size is favored to remain within normal limits. No definite effusion is seen.     Impression: Impression: Patchy chronic bilateral lung infiltrates. No acute infiltrate. Electronically Signed: Amado Salmeron MD  1/23/2025 5:11 PM EST  Workstation ID: [id number]     CT Chest Without Contrast Diagnostic    Result Date: 1/24/2025  CT CHEST WO CONTRAST DIAGNOSTIC Date of Exam: 1/24/2025 7:48 AM EST Indication: Shortness of breath.. Comparison: 10/22/2024 Technique: Axial CT images were obtained of the chest without contrast administration.  Reconstructed coronal and sagittal images were also obtained. Automated exposure control and iterative construction methods were used. Findings: There is a new somewhat spiculated nodular focus in the superior left lower lobe image #66 of series 2. The rapid interval development suggests an infectious or inflammatory process. Short-term follow-up CT suggested. Stable appearance of verrucous and cystic bronchiectasis involving all lobes of the lungs, but greatest in the right upper lobe and lower lobes. There are again adjacent micronodular densities  peribronchial distribution suggesting suggesting an infectious or inflammatory process. Nontuberculous mycobacterial infection or other atypical agent would be a top considerations. There is layering mucoid material in the trachea and right main bronchus. There is also probable mucoid impaction within areas of bronchiectasis noted. Small bilateral pleural effusions are noted, slightly increased from prior. There is consolidation in the lung bases bilaterally which may be due to atelectasis or additional infiltrates. Right subclavian Xjsogt-n-Dqvm catheter again noted. Retained tubing from left subclavian Nzdoxh-s-Ybsq catheter again noted. There is an increasing pericardial effusion, measuring up to 2.6 cm posteriorly on the right. Enlarged right paratracheal lymph node is noted measuring up to 1.4 cm, likely reactive. Hilar adenopathy is also suspected, but not well characterized due to noncontrast technique. Thyroid is enlarged and heterogeneous which may be due to goitrous change. Limited imaging through the upper abdomen demonstrates postcholecystectomy change. The adrenals have a grossly normal morphology. There is degenerative change in the spine.     Impression: Impression: 1.There is a new somewhat spiculated nodular focus in the superior left lower lobe image #66 of series. The rapid interval development suggests an infectious or inflammatory process. 3-month follow-up CT recommended per Fleischner guidelines. 2.Stable appearance of verrucous and cystic bronchiectasis involving all lobes of the lungs, but greatest in the right upper lobe and lower lobes. There are again adjacent micronodular densities in a peribronchial distribution suggesting an infectious or  inflammatory process. Nontuberculous mycobacterial infection or other atypical agent would be a top considerations. 3.Small bilateral pleural effusions, slightly increased from prior. There is consolidation in the lung bases bilaterally which may be  due to atelectasis or additional infiltrates. 4.Increasing pericardial effusion, now measuring up to 2.6 cm posteriorly on the right. 5.Enlarged right paratracheal lymph node measuring up to 1.4 cm, likely reactive. Hilar adenopathy is also suspected, but not well characterized due to noncontrast technique. 6.Additional findings as given above. Electronically Signed: Deshawn Soto MD  1/24/2025 9:43 AM EST  Workstation ID: [id number]    CT Chest Without Contrast Diagnostic    Result Date: 1/17/2025  CT CHEST WITHOUT CONTRAST HISTORY: Shortness of air, cough COMPARISON: February 11, 2024 FINDINGS: Axial CT images of the chest were obtained without contrast. Sagittal and coronal reformatted images were also obtained and reviewed. This study was performed with techniques to keep radiation doses as low as reasonably achievable, (ALARA). Individualized dose reduction techniques using automated exposure control or adjustment of mA and/or kV according to the patient size were employed. Bilateral deep lines are present. Small mediastinal nodes are seen without evidence of adenopathy. A new small pericardial effusion is noted measuring up to 13 mm in thickness. No axillary mass or adenopathy is present. There is mild bilateral gynecomastia. Widespread cystic bronchiectasis is again seen involving both lungs. There has been significant interval improvement in the multifocal nodular opacities seen on the prior exam felt to represent multifocal pneumonia. Mild atelectasis is noted at the lung bases. A small right pleural effusion is seen. No chest wall abnormality is identified. Limited images of the upper abdomen reveal postoperative changes from cholecystectomy.    Impression: Stable widespread cystic bronchiectasis in both lungs. Improved multifocal pneumonia since the prior study. New small pericardial effusion. Images reviewed, interpreted, and dictated by Francois Hatch MD       Assessment & Plan   Assessment / Plan      Active Hospital Problems:  Active Hospital Problems    Diagnosis    • **PNA (pneumonia)          Impression:  Cystic bronchiectasis with acute exacerbation  History of MDR Pseudomonas infection on chronic MOIZ neb  Difficulty with airway clearance  Obesity with BMI of 36  Acute on chronic hypoxic and hypercapnic respiratory failure    Plan:  Continue with Brovana, Pulmicort nebulizers twice daily.  Continue with Yupelri nebulizer once daily.  Continue chest vest 4 times daily.  Echocardiogram per primary service  Restarted back on meropenem IV as well as MOIZ neb.  I believe he might not be completely treated for Pseudomonas pneumonia given his severe bronchiectasis and need of prolonged antibiotics.  Continue Singulair once daily.  Check a sputum culture if able to obtain.  Continue supplemental oxygen via nasal cannula.  If no significant improvement, may need bronchoscopy in next 2 to 3 days.    I have reviewed labs, imaging, pertinent clinical data and provider notes.   I have discussed with bedside nurse and primary service.     Electronically signed by Martín Rivera MD, 1/24/2025, 15:19 EST.

## 2025-01-24 NOTE — PROGRESS NOTES
[facility]   Hospitalist Progress Note  Date: 2025  Patient Name: Preston Wallis  : 1965  MRN: [id number]  Date of admission: 2025  Room/Bed: 314/1      Subjective   Subjective     Chief Complaint:   Chief Complaint   Patient presents with   • Shortness of Breath       Summary:   59 y.o. male past medical history of COPD, hypertension, CHF, history of MDRO presents to the ED due to shortness of breath.  Patient was discharged on  for pneumonia.  Patient was discharged with IV ertapenem for his MDRO.  Patient states since being discharged his symptoms did not improve and continued to worsen.  At that time patient was discharged with home oxygen of 2 L and currently on 5 L.  Denies fever, chills, chest pain, abdominal pain, nausea, vomiting, diarrhea, headache or dysuria.  In the ED, patient's vitals showed temperature 97.6, heart rate 90 and blood pressure 155/73.  Patient's labs show troponin 94 and repeat 92, BNP 1100, sodium 135, creatinine 2.1, glucose 252, AST 49 ALT 42, white blood cell 24.2 and hemoglobin 9.0.  UA shows white blood cells with leukocytes and nitrites.  Chest x-ray shows chronic bilateral lung infiltrates.  Patient admitted to floors for further management.     Interval Followup:   Patient remains on 2 L nasal cannula, states that his symptoms are necessarily worse but just have not improved.  CT was performed today of the chest which demonstrates new spiculated left lower lobe nodule, bronchiectasis throughout both lungs, micronodule or peribronchial densities suggestive of atypical infection, right paratracheal lymph node and enlarging pericardial effusion.        Objective   Objective     Vitals:   Temp:  [97.6 °F (36.4 °C)-98.6 °F (37 °C)] 98 °F (36.7 °C)  Heart Rate:  [74-95] 91  Resp:  [14-20] 20  BP: (134-199)/(60-93) 156/69  Flow (L/min) (Oxygen Therapy):  [1-6] 2    Physical Exam   General: Awake, alert, in no acute distress  HENT: Atraumatic,  normocephalic. Nasal cannula in place 2 L minute oxygen flow rate  Eyes: pupils equal, round, without scleral icterus  Cardiovascular: Regular rate and rhythm  Pulmonary: No respiratory distress; no conversational dyspnea.  Expiratory rhonchi  Gastrointestinal: Soft nontender nondistended  Musculoskeletal: No gross deformities, or ankle edema      Result Review    Result Review:  I have personally reviewed these results:  [x]  Laboratory      Lab 01/24/25  0534 01/23/25  1735 01/23/25  1614   WBC 16.89*  --  24.28*   HEMOGLOBIN 8.6*  --  9.0*   HEMATOCRIT 29.3*  --  30.2*   PLATELETS 405  --  419   NEUTROS ABS 15.43*  --  21.34*   IMMATURE GRANS (ABS) 0.18*  --  0.22*   LYMPHS ABS 0.93  --  1.17   MONOS ABS 0.33  --  1.28*   EOS ABS 0.00  --  0.22   MCV 92.4  --  91.8   LACTATE  --  0.9  --          Lab 01/24/25  0534 01/23/25  1614 01/18/25  0620 01/17/25  1840   SODIUM 136 135*  --   --    POTASSIUM 4.8 4.9  --   --    CHLORIDE 99 94*  --   --    CO2 25.8 30.4*  --   --    ANION GAP 11.2 10.6  --   --    BUN 51* 50*  --   --    CREATININE 2.14* 2.16*  --   --    EGFR 34.8* 34.4*  --   --    GLUCOSE 301* 252*  --   --    CALCIUM 8.0* 8.6  --   --    MAGNESIUM  --   --  2.6* 2.8*         Lab 01/23/25  1614   TOTAL PROTEIN 7.9   ALBUMIN 3.1*   GLOBULIN 4.8   ALT (SGPT) 42*   AST (SGOT) 49*   BILIRUBIN 0.2   ALK PHOS 200*         Lab 01/24/25  0534 01/23/25  1735 01/23/25  1614   PROBNP  --   --  1,193.0*   HSTROP T 77* 92* 94*             Lab 01/18/25  0620   FERRITIN 199   FOLATE 84.7*   VITAMIN B 12 1,229*         Lab 01/23/25 2036   PH, ARTERIAL 7.315*   PCO2, ARTERIAL 54.1*   PO2 ART 94.8   O2 SATURATION ART 96.5   FIO2 32   HCO3 ART 27.5*   BASE EXCESS ART 0.8     Brief Urine Lab Results  (Last result in the past 365 days)        Color   Clarity   Blood   Leuk Est   Nitrite   Protein   CREAT   Urine HCG        01/23/25 1912 Yellow   Turbid   Large (3+)   Moderate (2+)   Negative   >=300 mg/dL (3+)                  [x]  Microbiology   Microbiology Results (last 10 days)       Procedure Component Value - Date/Time    COVID-19, FLU A/B, RSV PCR 1 HR TAT - Swab, Nasopharynx [[id number]]  (Normal) Collected: 01/23/25 1723    Lab Status: Final result Specimen: Swab from Nasopharynx Updated: 01/23/25 1806     COVID19 Not Detected     Influenza A PCR Not Detected     Influenza B PCR Not Detected     RSV, PCR Not Detected    Narrative:      Fact sheet for providers: https://www.fda.gov/media/637689/download    Fact sheet for patients: https://www.fda.gov/media/201912/download    Test performed by PCR.          [x]  Radiology  CT Chest Without Contrast Diagnostic    Result Date: 1/24/2025  Impression: 1.There is a new somewhat spiculated nodular focus in the superior left lower lobe image #66 of series. The rapid interval development suggests an infectious or inflammatory process. 3-month follow-up CT recommended per Fleischner guidelines. 2.Stable appearance of verrucous and cystic bronchiectasis involving all lobes of the lungs, but greatest in the right upper lobe and lower lobes. There are again adjacent micronodular densities in a peribronchial distribution suggesting an infectious or  inflammatory process. Nontuberculous mycobacterial infection or other atypical agent would be a top considerations. 3.Small bilateral pleural effusions, slightly increased from prior. There is consolidation in the lung bases bilaterally which may be due to atelectasis or additional infiltrates. 4.Increasing pericardial effusion, now measuring up to 2.6 cm posteriorly on the right. 5.Enlarged right paratracheal lymph node measuring up to 1.4 cm, likely reactive. Hilar adenopathy is also suspected, but not well characterized due to noncontrast technique. 6.Additional findings as given above. Electronically Signed: Deshawn Soto MD  1/24/2025 9:43 AM EST  Workstation ID: [id number]    XR Chest 1 View    Result Date: 1/23/2025  Impression: Patchy  chronic bilateral lung infiltrates. No acute infiltrate. Electronically Signed: Amado Salmeron MD  1/23/2025 5:11 PM EST  Workstation ID: [id number]    XR Chest 1 View    Result Date: 1/17/2025  Persistent multifocal pneumonia. Images reviewed, interpreted, and dictated by Dr. Dhruv Duncan. Transcribed by Rosie Monsalve.    CT Chest Without Contrast Diagnostic    Result Date: 1/17/2025  Stable widespread cystic bronchiectasis in both lungs. Improved multifocal pneumonia since the prior study. New small pericardial effusion. Images reviewed, interpreted, and dictated by Francois Hatch MD   []  EKG/Telemetry   []  Cardiology/Vascular   []  Pathology  []  Old records  []  Other:    Assessment & Plan   Assessment / Plan     Assessment  Acute hypoxemic respiratory failure  History of pneumonia with MDRO Pseudomonas  Complicated UTI  Type 2 diabetes on insulin  History of COPD  CKD  Atrial fibrillation on Eliquis  Elevated troponins  Heart failure with preserved ejection fraction  History of BPH  Chronic left heel ulcer  Obstructive sleep apnea     Plan  Continue monitoring in [employer]  Telemetry  Monitor vitals  A.m. CBC BMP  Consult pulmonology for possible atypical respiratory infection with failure of antibiotic   Consult cardiology for enlarging pericardial effusion in setting of above  Follow-up blood cultures ordered  Follow-up echo ordered  Follow-up sputum culture.  Check AFB sputum as well  Continue IV meropenem.  Resume home inhaled Tobramycin  Continue IV Solu-Medrol   Continue home long-acting inhaler, DuoNebs as needed      VTE Prophylaxis:  Pharmacologic VTE prophylaxis orders are present.        CODE STATUS:   Code Status (Patient has no pulse and is not breathing): CPR (Attempt to Resuscitate)  Medical Interventions (Patient has pulse or is breathing): Full Support      Electronically signed by Matty Alejandra MD, 1/24/2025, 12:49 EST.

## 2025-01-24 NOTE — CONSULTS
"  [facility]   Consult Note    Patient Name: Preston Wallis  : 1965  MRN: [id number]  Primary Care Physician: Kimmy Riley MD  Referring Physician: No ref. provider found  Date of admission: 2025    Subjective   Subjective     Reason for Consult: Shortness of breath, evaluation of congestive failure, elevated troponins but flat.    HPI:  Preston Wallis is a 59 y.o. male with history of severe COPD obesity, diabetes, pulmonary embolism shortness of breath, obesity gram-negative pneumonia history of cystic bronchiectasis chronic kidney disease, possible diastolic congestive failure history of long diabetes with complications in the lower extremity, hypertension history of fracture of the proximal humerus hyperlipidemia, muscle weakness, shortness cough and wheezing, acute on chronic hypoxic and hypercapnic respiratory failure patient was noted to have elevated flat troponins, renal insufficiency, elevated sed rate of 102,uncontrolled diabetes currently evaluated and treated with pulmonology, patient indicated he was never diagnosed to have coronary artery disease, no history of CVA, patient has multiple CAD risk factors, EKG showed sinus rhythm nonspecific ST abnormalities.  No acute ST segment changes.  Sed rate and white blood cell count quite elevated.  Lipid panel not done.    Review of Systems  Review of Systems   Constitutional:  Positive for fatigue.   HENT:  Positive for congestion.    Eyes: Negative.    Respiratory:  Positive for cough and shortness of breath.    Cardiovascular:  Negative for chest pain and palpitations.   Gastrointestinal: Negative.    Musculoskeletal: Negative.    Skin:  Positive for pallor.   Neurological:  Positive for weakness.   Psychiatric/Behavioral: Negative.         Personal History     Past Medical History:   Diagnosis Date    Age-related cognitive decline     Allergic contact dermatitis     Allergies     Anemia     Bedbound     2023 \"MY LEG MUSCLES STOPPED " "WORKING\"    Bronchiectasis with acute lower respiratory infection     Charcot foot due to diabetes mellitus 09/10/2013    Chronic diastolic (congestive) heart failure     Chronic kidney disease     Chronic respiratory failure with hypoxia     Closed supracondylar fracture of femur 01/12/2022    COPD (chronic obstructive pulmonary disease)     Deep vein thrombosis (DVT) of lower extremity associated with air travel 01/13/2023    Dependence on supplemental oxygen     Eczema     Erectile dysfunction     due to organic reasons    Essential (primary) hypertension     Fracture     closed fracture of other tarsal and metatarsal bones    Fracture of proximal humerus 01/13/2023    GERD without esophagitis     High risk medication use     Hypercholesteremia     Hypomagnesemia     Infected stasis ulcer of left lower extremity 01/13/2023    Insomnia     Low back pain     Major depressive disorder     Morbid (severe) obesity due to excess calories     MRSA pneumonia     Muscle weakness     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 without evidence of necrosis     Obstructive sleep apnea (adult) (pediatric)     On home O2     REPORTS WEARING 2L/NC AAT    Other forms of dyspnea     Other long term (current) drug therapy     Other specified noninfective gastroenteritis and colitis     Other spondylosis, lumbar region     Pain in both knees     Paroxysmal atrial fibrillation     Peripheral neuropathy     attributed to type 2 diabetes    Pneumonia, unspecified organism     Polyneuropathy     Rash and other nonspecific skin eruption     Self-catheterizes urinary bladder     EVERY 4 HOURS    Smoking     \"SOMETIMES\"    Syncope and collapse     Tachycardia     Tinnitus 01/13/2023    Type 1 diabetes mellitus with diabetic chronic kidney disease     Type 2 diabetes mellitus     Unspecified fall, initial encounter     Urinary retention        Past Surgical History:   Procedure Laterality Date    " CARDIAC CATHETERIZATION Left 8/15/2024    Procedure: Carbon dioxide aortogram with left leg angiogram, possible angioplasty or stenting;  Surgeon: Moshe Willson MD;  Location:  [facility];  Service: Vascular;  Laterality: Left;    CHOLECYSTECTOMY      CYSTOSCOPY      FEMUR SURGERY Left     Shravan placed    KNEE SURGERY Left     OTHER SURGICAL HISTORY Left     venous port, REMOVED    PORTACATH PLACEMENT Right     TIBIAL PLATEAU OPEN REDUCTION INTERNAL FIXATION Left 12/22/2023    Procedure: TIBIAL PLATEAU OPEN REDUCTION INTERNAL FIXATION;  Surgeon: Hugo Kline MD;  Location: [facility];  Service: Orthopedics;  Laterality: Left;    TONSILLECTOMY AND ADENOIDECTOMY         Family History: family history includes Asthma in his father; Cancer in his sister; Coronary artery disease in his mother; Diabetes type II in his mother and sister; Hypertension in his mother. Otherwise pertinent FHx was reviewed and not pertinent to current issue.    Social History:  reports that he quit smoking about 32 years ago. His smoking use included cigarettes. He started smoking about 44 years ago. He has a 12 pack-year smoking history. He has been exposed to tobacco smoke. He has never used smokeless tobacco. He reports that he does not currently use alcohol. He reports that he does not use drugs.    Home Medications:  DULoxetine, FreeStyle Bethany 3 Plus Sensor, GNP One Daily Plus Iron, Insulin Lispro (1 Unit Dial), Magnesium Oxide -Mg Supplement, NIFEdipine XL, O2, Semaglutide (1 MG/DOSE), Vitamin D3, apixaban, arformoterol, aspirin, atorvastatin, budesonide, bumetanide, busPIRone, calcium citrate, carvedilol, dapagliflozin, docusate sodium, famotidine, ferrous gluconate, finasteride, folic acid, insulin detemir, ipratropium-albuterol, meropenem, montelukast, predniSONE, saccharomyces boulardii, tamsulosin, tiotropium, tobramycin PF, traZODone, and vitamin C    Hospital Medications:    Current  Facility-Administered Medications:     apixaban (ELIQUIS) tablet 5 mg, 5 mg, Oral, Q12H, Pedrito Fuchs MD, 5 mg at 01/24/25 0911    arformoterol (BROVANA) nebulizer solution 15 mcg, 15 mcg, Nebulization, BID - RT, Matty Alejandra MD, 15 mcg at 01/24/25 1407    aspirin EC tablet 81 mg, 81 mg, Oral, QAM, Pedrito Fuchs MD, 81 mg at 01/24/25 0912    atorvastatin (LIPITOR) tablet 20 mg, 20 mg, Oral, Daily, Pedrito Fuchs MD, 20 mg at 01/24/25 0910    sennosides-docusate (PERICOLACE) 8.6-50 MG per tablet 2 tablet, 2 tablet, Oral, BID PRN **AND** polyethylene glycol (MIRALAX) packet 17 g, 17 g, Oral, Daily PRN **AND** bisacodyl (DULCOLAX) EC tablet 5 mg, 5 mg, Oral, Daily PRN **AND** bisacodyl (DULCOLAX) suppository 10 mg, 10 mg, Rectal, Daily PRN, Matty Alejandra MD    budesonide (PULMICORT) nebulizer solution 0.5 mg, 0.5 mg, Nebulization, BID, Pedrito Fuchs MD, 0.5 mg at 01/24/25 1038    bumetanide (BUMEX) tablet 2 mg, 2 mg, Oral, BID, Pedrito Fuchs MD, 2 mg at 01/24/25 0912    busPIRone (BUSPAR) tablet 15 mg, 15 mg, Oral, BID, Pedrito Fuchs MD, 15 mg at 01/24/25 0910    carvedilol (COREG) tablet 25 mg, 25 mg, Oral, Q12H, Pedrito Fuchs MD, 25 mg at 01/24/25 0913    collagenase ointment 1 Application, 1 Application, Topical, 2 times per day, Matty Alejandra MD, 1 Application at 01/24/25 1709    dextrose (D50W) (25 g/50 mL) IV injection 25 g, 25 g, Intravenous, Q15 Min PRN, Mtaty Alejandra MD    dextrose (GLUTOSE) oral gel 15 g, 15 g, Oral, Q15 Min PRN, Matty Alejandra MD    DULoxetine (CYMBALTA) DR capsule 60 mg, 60 mg, Oral, Daily, Pedrito Fuchs MD, 60 mg at 01/24/25 0910    famotidine (PEPCID) tablet 40 mg, 40 mg, Oral, QAM, Pedrito Fuchs MD, 40 mg at 01/24/25 0910    ferrous sulfate tablet 325 mg, 325 mg, Oral, Daily With Breakfast, Pedrito Fuchs MD, 325 mg at 01/24/25 1208    finasteride (PROSCAR) tablet 5 mg, 5 mg, Oral, Daily, Matty Alejandra MD, 5 mg at  01/24/25 1432    glucagon (GLUCAGEN) injection 1 mg, 1 mg, Intramuscular, Q15 Min PRN, Matty Alejandra MD    guaiFENesin (MUCINEX) 12 hr tablet 600 mg, 600 mg, Oral, Q12H, Matty Alejandra MD, 600 mg at 01/24/25 0911    insulin glargine (LANTUS, SEMGLEE) injection 15 Units, 15 Units, Subcutaneous, Nightly, Pedrito Fuchs MD, 15 Units at 01/23/25 2333    Insulin Lispro (humaLOG) injection 2-7 Units, 2-7 Units, Subcutaneous, 4x Daily AC & at Bedtime, Matty Alejandra MD, 3 Units at 01/24/25 1646    ipratropium-albuterol (DUO-NEB) nebulizer solution 3 mL, 3 mL, Nebulization, Q4H PRN, Matty Alejandra MD    meropenem (MERREM) 1,000 mg in sodium chloride 0.9 % 100 mL IVPB-VTB, 1,000 mg, Intravenous, Q12H, Matty Alejandra MD, 1,000 mg at 01/24/25 1209    methylPREDNISolone sodium succinate (SOLU-Medrol) 40 mg in sterile water (preservative free) 1 mL, 40 mg, Intravenous, Q8H, Matty Alejandra MD, 40 mg at 01/24/25 0914    montelukast (SINGULAIR) tablet 10 mg, 10 mg, Oral, Nightly, Pedrito Fuchs MD, 10 mg at 01/23/25 2331    NIFEdipine XL (PROCARDIA XL) 24 hr tablet 30 mg, 30 mg, Oral, QAM, Pedrito Fuchs MD, 30 mg at 01/24/25 0912    nitroglycerin (NITROSTAT) SL tablet 0.4 mg, 0.4 mg, Sublingual, Q5 Min PRN, Matty Alejandra MD    revefenacin (YUPELRI) nebulizer solution 175 mcg, 175 mcg, Nebulization, Daily - RT, Pedrito Fuchs MD, 175 mcg at 01/24/25 1038    sodium chloride 0.9 % flush 10 mL, 10 mL, Intravenous, PRN, Matty Alejandra MD, 10 mL at 01/24/25 1209    sodium chloride 0.9 % flush 10 mL, 10 mL, Intravenous, Q12H, Matty Alejandra MD, 10 mL at 01/24/25 0913    sodium chloride 0.9 % flush 10 mL, 10 mL, Intravenous, PRN, Matty Alejandra MD    sodium chloride 0.9 % infusion 40 mL, 40 mL, Intravenous, PRN, Matty Alejandra MD    tamsulosin (FLOMAX) 24 hr capsule 0.4 mg, 0.4 mg, Oral, Daily, Pedrito Fuchs MD, 0.4 mg at 01/24/25 0911    tobramycin PF (MOIZ) nebulizer solution 300  mg, 300 mg, Nebulization, BID - RT, Matty Alejandra MD, 300 mg at 01/24/25 1407    traZODone (DESYREL) tablet 150 mg, 150 mg, Oral, Nightly, Matty Alejandra MD    Allergies:  Allergies   Allergen Reactions    Benadryl [Diphenhydramine] Itching    Proventil [Albuterol] Other (See Comments)     Mouth sores         Objective    Objective   Vitals:  Temp:  [97.5 °F (36.4 °C)-98.6 °F (37 °C)] 97.5 °F (36.4 °C)  Heart Rate:  [74-93] 93  Resp:  [14-20] 16  BP: (134-199)/(60-93) 156/70  Flow (L/min) (Oxygen Therapy):  [1-6] 2    Physical Exam:  Physical Exam  Constitutional:       Appearance: He is obese.   Eyes:      Extraocular Movements: Extraocular movements intact.   Cardiovascular:      Rate and Rhythm: Normal rate and regular rhythm.      Heart sounds: Murmur heard.   Pulmonary:      Breath sounds: Rhonchi present.   Musculoskeletal:      Right lower leg: No edema.      Comments: Probably diabetic and pressure ulcers in the lower extremity   Neurological:      General: No focal deficit present.      Mental Status: He is alert and oriented to person, place, and time.      Motor: Weakness present.           Result Review    Result Review:  I have personally reviewed the results from the time of this admission to 01/24/25 5:49 PM EST and agree with these findings:  [x]  Laboratory  []  Microbiology  []  Radiology  [x]  EKG/Telemetry   []  Cardiology/Vascular   []  Pathology  []  Old records  []  Other:    Most notable findings include: Chronically ill gentleman currently lives with possible gram-negative pneumonia, patient has long history of diabetes, he stopped smoking long time ago, has underlying COPD and significant lung pathology according the chart, echocardiogram showed preserved LV function, patient never had a cardiac catheterization according the history, will schedule nuclear study on Monday, currently is free of chest pain.  EKG shows sinus rhythm no ST abnormalities but no acute ischemic  changes.    Assessment & Plan   Assessment / Plan     Active Hospital Problems:  Active Hospital Problems    Diagnosis     **PNA (pneumonia)        Plan:   Nuclear study on Monday    Electronically signed by Alec Padron MD, 01/24/25, 5:49 PM EST.

## 2025-01-24 NOTE — CONSULTS
"Nutrition Services    Patient Name: Preston Wallis  YOB: 1965  MRN: [id number]  Admission date: 1/23/2025      CLINICAL NUTRITION ASSESSMENT      Reason for Assessment  Identified at Risk by Screening Criteria -PI   H&P:  Past Medical History:   Diagnosis Date    Age-related cognitive decline     Allergic contact dermatitis     Allergies     Anemia     Bedbound     11/2023 \"MY LEG MUSCLES STOPPED WORKING\"    Bronchiectasis with acute lower respiratory infection     Charcot foot due to diabetes mellitus 09/10/2013    Chronic diastolic (congestive) heart failure     Chronic kidney disease     Chronic respiratory failure with hypoxia     Closed supracondylar fracture of femur 01/12/2022    COPD (chronic obstructive pulmonary disease)     Deep vein thrombosis (DVT) of lower extremity associated with air travel 01/13/2023    Dependence on supplemental oxygen     Eczema     Erectile dysfunction     due to organic reasons    Essential (primary) hypertension     Fracture     closed fracture of other tarsal and metatarsal bones    Fracture of proximal humerus 01/13/2023    GERD without esophagitis     High risk medication use     Hypercholesteremia     Hypomagnesemia     Infected stasis ulcer of left lower extremity 01/13/2023    Insomnia     Low back pain     Major depressive disorder     Morbid (severe) obesity due to excess calories     MRSA pneumonia     Muscle weakness     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 without evidence of necrosis     Obstructive sleep apnea (adult) (pediatric)     On home O2     REPORTS WEARING 2L/NC AAT    Other forms of dyspnea     Other long term (current) drug therapy     Other specified noninfective gastroenteritis and colitis     Other spondylosis, lumbar region     Pain in both knees     Paroxysmal atrial fibrillation     Peripheral neuropathy     attributed to type 2 diabetes    Pneumonia, unspecified organism     Polyneuropathy  " "   Rash and other nonspecific skin eruption     Self-catheterizes urinary bladder     EVERY 4 HOURS    Syncope and collapse     Tachycardia     Tinnitus 01/13/2023    Type 1 diabetes mellitus with diabetic chronic kidney disease     Type 2 diabetes mellitus     Unspecified fall, initial encounter     Urinary retention         Current Problems:   Active Hospital Problems    Diagnosis     **PNA (pneumonia)         Nutrition/Diet History         Narrative   Upon RD visit, patient awake with wife and daughter in room. Pt reports a poor appetite r/t WOB. Amenable to trial chopped foods. Chart graphics reveal % meal intake. Denies N/V/D. C/o constipation. Stool softeners ordered prn. Alerted nurse of patient's concerns. Wound care notes chronic wound to L heel, deep tissue injury along L lateral distal heel, and soft tissue necrosis along the perirectal tissue. Patient amenable to Rosalino BID. Prefers orange. Dislikes traditional supplements.      Anthropometrics        Current Height, Weight Height: 175.3 cm (69\")  Weight: 111 kg (244 lb 0.8 oz)   Current BMI Body mass index is 36.04 kg/m².   BMI Classification Obese Class II   % %       Weight Hx  Wt Readings from Last 30 Encounters:   01/24/25 0924 111 kg (244 lb 0.8 oz)   01/23/25 1555 123 kg (270 lb 4.5 oz)   12/12/24 1050 109 kg (241 lb)   12/11/24 1048 109 kg (241 lb)   12/05/24 1155 111 kg (244 lb)   10/30/24 1004 111 kg (245 lb)   08/22/24 1439 109 kg (241 lb)   08/15/24 1325 112 kg (247 lb 9.2 oz)   08/12/24 1238 126 kg (278 lb)   07/18/24 1326 126 kg (278 lb)   05/20/24 1030 119 kg (263 lb)   05/17/24 1151 119 kg (263 lb)   05/17/24 1028 119 kg (263 lb)   05/03/24 0432 125 kg (275 lb 5.7 oz)   05/01/24 0438 124 kg (272 lb 14.9 oz)   04/30/24 0600 127 kg (279 lb 5.2 oz)   04/29/24 2114 126 kg (277 lb 9 oz)   04/29/24 0241 116 kg (255 lb 11.7 oz)   04/19/24 0614 116 kg (255 lb 1.2 oz)   04/18/24 0617 119 kg (262 lb 2 oz)   04/17/24 0616 115 kg (252 lb " 13.9 oz)   04/16/24 0519 124 kg (272 lb 11.3 oz)   04/15/24 0617 124 kg (272 lb 14.9 oz)   04/15/24 0057 124 kg (273 lb 9.5 oz)   04/15/24 0050 124 kg (273 lb 9.5 oz)   04/14/24 1908 127 kg (279 lb 1.6 oz)   04/04/24 1042 122 kg (270 lb)   02/16/24 1458 126 kg (276 lb 10.8 oz)   01/30/24 1032 128 kg (282 lb)   01/11/24 1556 123 kg (272 lb)   12/15/23 0330 127 kg (280 lb)   12/14/23 1400 126 kg (277 lb)   12/06/23 1904 126 kg (277 lb 12.5 oz)   11/20/23 0953 126 kg (278 lb)   11/17/23 1019 126 kg (278 lb)   11/06/23 1119 126 kg (278 lb)   10/11/23 1020 126 kg (278 lb)   08/22/23 0917 126 kg (278 lb)   08/21/23 1017 129 kg (285 lb)   07/24/23 0849 129 kg (285 lb)   07/19/23 1346 129 kg (285 lb)   06/29/23 1009 132 kg (290 lb)   03/23/23 1508 132 kg (290 lb)          Wt Change Observation -12.2% x 6 months     Estimated/Assessed Needs  Estimated Needs based on: Ideal Body Weight       Energy Requirements 25-30 kcal/kg   EST Needs (kcal/day) 2811-5396       Protein Requirements 1.2-1.5 g.kg   EST Daily Needs (g/day)        Fluid Requirements 1 ml/kcal    Estimated Needs (mL/day) 8650-2173     Labs/Medications         Pertinent Labs Reviewed.   Results from last 7 days   Lab Units 01/24/25  0534 01/23/25  1614   SODIUM mmol/L 136 135*   POTASSIUM mmol/L 4.8 4.9   CHLORIDE mmol/L 99 94*   CO2 mmol/L 25.8 30.4*   BUN mg/dL 51* 50*   CREATININE mg/dL 2.14* 2.16*   CALCIUM mg/dL 8.0* 8.6   BILIRUBIN mg/dL  --  0.2   ALK PHOS U/L  --  200*   ALT (SGPT) U/L  --  42*   AST (SGOT) U/L  --  49*   GLUCOSE mg/dL 301* 252*     Results from last 7 days   Lab Units 01/24/25  0534 01/23/25  1614 01/18/25  0620 01/17/25  1840   MAGNESIUM mg/dL  --   --  2.6* 2.8*   HEMOGLOBIN g/dL 8.6*   < >  --   --    HEMATOCRIT % 29.3*   < >  --   --     < > = values in this interval not displayed.     COVID19   Date Value Ref Range Status   01/23/2025 Not Detected Not Detected - Ref. Range Final     Lab Results   Component Value Date    HGBA1C  8.8 (A) 12/11/2024         Pertinent Medications Reviewed.     Malnutrition Severity Assessment              Nutrition Diagnosis         Nutrition Dx Problem 1 Increased nutrient needs related to increased protein demand as evidenced by impaired skin integrity.     Nutrition Intervention           Current Nutrition Orders & Evaluation of Intake       Current PO Diet Diet: Cardiac; No Salt Packet; Fluid Consistency: Thin (IDDSI 0)   Supplement Orders Placed This Encounter      Dietary Nutrition Supplements Rosalino; orange           Nutrition Intervention/Prescription        Rosalino BID mixed with diet Sprite.         Medical Nutrition Therapy/Nutrition Education          Learner     Readiness Patient, Family, and Significant Other  Acceptance     Method     Response Explanation  Verbalizes understanding     Monitor/Evaluation        Monitor Per protocol, PO intake, Supplement intake, Weight, Skin status, POC/GOC     Nutrition Discharge Plan         To be determined     Electronically signed by:  Fanny Tucker RD  01/24/25 13:56 EST

## 2025-01-24 NOTE — H&P
[facility]  Date: [facility]25   Patient Name: Preston Wallis  : 1965  MRN: [id number]  Primary Care Physician:  Kimmy Riley MD  Date of admission: 2025    Subjective   Subjective     Chief Complaint: Shortness of breath    HPI:    Preston Wallis is a 59 y.o. male past medical history of COPD, hypertension, CHF, history of MDRO presents to the ED due to shortness of breath.  Patient was discharged on  for pneumonia.  Patient was discharged with IV ertapenem for his MDRO.  Patient states since being discharged his symptoms did not improve and continued to worsen.  At that time patient was discharged with home oxygen of 2 L and currently on 5 L.  Denies fever, chills, chest pain, abdominal pain, nausea, vomiting, diarrhea, headache or dysuria.  In the ED, patient's vitals showed temperature 97.6, heart rate 90 and blood pressure 155/73.  Patient's labs show troponin 94 and repeat 92, BNP 1100, sodium 135, creatinine 2.1, glucose 252, AST 49 ALT 42, white blood cell 24.2 and hemoglobin 9.0.  UA shows white blood cells with leukocytes and nitrites.  Chest x-ray shows chronic bilateral lung infiltrates.  Patient admitted to floors for further management.    Personal History     Past Medical History:  Past Medical History:   Diagnosis Date    Age-related cognitive decline     Allergic contact dermatitis     Allergies     Anemia     Bedbound     2023 \"MY LEG MUSCLES STOPPED WORKING\"    Bronchiectasis with acute lower respiratory infection     Charcot foot due to diabetes mellitus 09/10/2013    Chronic diastolic (congestive) heart failure     Chronic kidney disease     Chronic respiratory failure with hypoxia     Closed supracondylar fracture of femur 2022    COPD (chronic obstructive pulmonary disease)     Deep vein thrombosis (DVT) of lower extremity associated with air travel 2023    Dependence on supplemental oxygen     Eczema     Erectile " dysfunction     due to organic reasons    Essential (primary) hypertension     Fracture     closed fracture of other tarsal and metatarsal bones    Fracture of proximal humerus 01/13/2023    GERD without esophagitis     High risk medication use     Hypercholesteremia     Hypomagnesemia     Infected stasis ulcer of left lower extremity 01/13/2023    Insomnia     Low back pain     Major depressive disorder     Morbid (severe) obesity due to excess calories     MRSA pneumonia     Muscle weakness     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 without evidence of necrosis     Obstructive sleep apnea (adult) (pediatric)     On home O2     REPORTS WEARING 2L/NC AAT    Other forms of dyspnea     Other long term (current) drug therapy     Other specified noninfective gastroenteritis and colitis     Other spondylosis, lumbar region     Pain in both knees     Paroxysmal atrial fibrillation     Peripheral neuropathy     attributed to type 2 diabetes    Pneumonia, unspecified organism     Polyneuropathy     Rash and other nonspecific skin eruption     Syncope and collapse     Tachycardia     Tinnitus 01/13/2023    Type 1 diabetes mellitus with diabetic chronic kidney disease     Type 2 diabetes mellitus     Unspecified fall, initial encounter     Urinary retention        Past Surgical History:  Past Surgical History:   Procedure Laterality Date    CARDIAC CATHETERIZATION Left 8/15/2024    Procedure: Carbon dioxide aortogram with left leg angiogram, possible angioplasty or stenting;  Surgeon: Moshe Willson MD;  Location: [facility] CATH INVASIVE LOCATION;  Service: Vascular;  Laterality: Left;    CHOLECYSTECTOMY      CYSTOSCOPY      FEMUR SURGERY Left     Shravan placed    KNEE SURGERY Left     OTHER SURGICAL HISTORY Left     venous port, REMOVED    PORTACATH PLACEMENT Right     TIBIAL PLATEAU OPEN REDUCTION INTERNAL FIXATION Left 12/22/2023    Procedure: TIBIAL PLATEAU OPEN REDUCTION INTERNAL  FIXATION;  Surgeon: Hugo Kline MD;  Location: [facility];  Service: Orthopedics;  Laterality: Left;    TONSILLECTOMY AND ADENOIDECTOMY         Family History:   Family History   Problem Relation Age of Onset    Coronary artery disease Mother     Hypertension Mother     Diabetes type II Mother     Asthma Father     Diabetes type II Sister     Cancer Sister        Social History:   Social History     Socioeconomic History    Marital status:    Tobacco Use    Smoking status: Former     Current packs/day: 0.00     Average packs/day: 1 pack/day for 12.0 years (12.0 ttl pk-yrs)     Types: Cigarettes     Start date:      Quit date:      Years since quittin.0     Passive exposure: Past    Smokeless tobacco: Never   Vaping Use    Vaping status: Never Used   Substance and Sexual Activity    Alcohol use: Not Currently    Drug use: Never    Sexual activity: Defer       Home Medications:  DULoxetine, FreeStyle Bethany 3 Plus Sensor, GNP One Daily Plus Iron, Insulin Lispro (1 Unit Dial), Magnesium Oxide -Mg Supplement, NIFEdipine XL, O2, Semaglutide (1 MG/DOSE), Vitamin D3, apixaban, arformoterol, aspirin, atorvastatin, budesonide, bumetanide, busPIRone, calcium citrate, carvedilol, dapagliflozin, docusate sodium, famotidine, ferrous gluconate, finasteride, folic acid, insulin detemir, ipratropium-albuterol, meropenem, montelukast, predniSONE, saccharomyces boulardii, tamsulosin, tiotropium, tobramycin PF, traZODone, and vitamin C    Allergies:  Allergies   Allergen Reactions    Benadryl [Diphenhydramine] Itching    Proventil [Albuterol] Other (See Comments)     Mouth sores         Review of Systems   All systems were reviewed and negative except for: Above    Objective   Objective     Vitals:   Temp:  [97.6 °F (36.4 °C)] 97.6 °F (36.4 °C)  Heart Rate:  [90-95] 90  Resp:  [20] 20  BP: (154-155)/(73-74) 155/73    Physical Exam    Constitutional: Awake, alert, obese   Eyes: Pupils equal, sclerae  anicteric, no conjunctival injection   HENT: NCAT, mucous membranes moist   Neck: Supple, no thyromegaly, no lymphadenopathy, trachea midline   Respiratory: Wheezing bilaterally, crackles noted   Cardiovascular: RRR, no murmurs, rubs, or gallops, palpable pedal pulses bilaterally   Gastrointestinal: Positive bowel sounds, soft, nontender, nondistended   Musculoskeletal: No bilateral ankle edema, no clubbing or cyanosis to extremities   Psychiatric: Appropriate affect, cooperative   Neurologic: Oriented x 3, strength symmetric in all extremities, Cranial Nerves grossly intact to confrontation, speech clear   Skin: No rashes     Result Review    Result Review:  I have personally reviewed the results from the time of this admission to 1/23/2025 20:03 EST and agree with these findings:  [x]  Laboratory  []  Microbiology  [x]  Radiology  []  EKG/Telemetry   []  Cardiology/Vascular   []  Pathology  [x]  Old records  []  Other:      Assessment & Plan   Assessment / Plan     Assessment/Plan:     Assessment  Acute hypoxemic respiratory failure  History of pneumonia with MDRO Pseudomonas  Complicate UTI  Type 2 diabetes on insulin  History of COPD  CKD  Atrial fibrillation on Eliquis  Elevated troponins  Heart failure with preserved ejection fraction  History of BPH  Chronic left heel ulcer  Obstructive sleep apnea    Plan  Patient admitted to [facility]  Telemetry  Monitor vitals  CBC BMP  Troponin trend  Respiratory panel negative  Blood cultures ordered  Chest x-ray reviewed  CT chest ordered  Echo ordered  Start home meds  Start IV meropenem  IV Solu-Medrol 80 3 times daily  Start home long-acting inhaler  DuoNebs and Pulmicort  IV meropenem      VTE Prophylaxis:  Pharmacologic VTE prophylaxis orders are signed & held. Pharmacologic VTE prophylaxis orders are present.        CODE STATUS:    Code Status (Patient has no pulse and is not breathing): CPR (Attempt to Resuscitate)  Medical Interventions (Patient has pulse or is  breathing): Full Support      Admission Status:  I believe this patient meets inpt status.    Electronically signed by Pedrito Fuchs MD, 01/23/25, 8:03 PM EST.

## 2025-01-24 NOTE — PAYOR COMM NOTE
"PATIENT INFORMATION  Name:  Preston Wallis  MRN#:     [id number]  :  1965         ADMISSION INFORMATION  CLASS: Inpatient   DOS:  25        CURRENT ATTENDING PROVIDER INFORMATION  Name/NPI: Remington Mora MD [[id number]]  Phone:  Phone: [phone]  Fax:  [phone]        REQUESTING PROVIDER and RENDERING FACILITY  Name:  [facility]   NPI:  [id number]  TID:  [id number]  Address:      [facility] [address] [place] [address]  Phone:               [phone]  Fax:  [phone]        UTILIZATION REVIEW CONTACT INFORMATION  Phone:      [phone]  Fax:           [phone]        ADMISSION DIAGNOSIS  PNA (pneumonia) [J18.9]        ++++++++++++++++++++++++++++++++++++++++++++++++++++++++++++++++++++++++++++++++          Preston Wallis \"Gómez\" (59 y.o. Male)       Date of Birth   1965    Social Security Number       Address   [address] [place] [address]    Home Phone   [phone]    MRN   [id number]       Christianity   Christian    Marital Status                               Admission Date   25    Admission Type   Emergency    Admitting Provider   Pedrito Fuchs MD    Attending Provider   Remington Mora MD    Department, Room/Bed   [facility] EMERGENCY ROOM,        Discharge Date       Discharge Disposition       Discharge Destination                                 Attending Provider: Remington Mora MD    Allergies: Benadryl [Diphenhydramine], Proventil [Albuterol]    Isolation: None   Infection: MRSA/History Only (12/15/23)   Code Status: CPR    Ht: 175.3 cm (69\")   Wt: 123 kg (270 lb 4.5 oz)    Admission Cmt: None   Principal Problem: PNA (pneumonia) [J18.9]                   Active Insurance as of 2025       Primary Coverage       Payor Plan Insurance Group Employer/Plan Group    [facility] BY [facility] BY Y[employer] [id number]       Payor Plan Address Payor Plan Phone Number Payor Plan Fax Number " "Effective Dates    [address] [address]   3/1/2024 - None Entered    [facility] [id number]         Subscriber Name Subscriber Birth Date Member ID       PRESTON WALLIS 1965 [id number]                     Emergency Contacts        (Rel.) Home Phone Work Phone Mobile Phone    Kami Wallis (Spouse) [phone]    Elaine Zaldivar (Daughter) -- -- [phone]                 History & Physical        Pedrito Fuchs MD at 25           [facility] HISTORY AND PHYSICAL  Date: 2025   Patient Name: Preston Wallis  : 1965  MRN: [id number]  Primary Care Physician:  Kimmy Riley MD  Date of admission: 2025    Subjective  Subjective     Chief Complaint: Shortness of breath    HPI:    Preston Wallis is a 59 y.o. male past medical history of COPD, hypertension, CHF, history of MDRO presents to the ED due to shortness of breath.  Patient was discharged on  for pneumonia.  Patient was discharged with IV ertapenem for his MDRO.  Patient states since being discharged his symptoms did not improve and continued to worsen.  At that time patient was discharged with home oxygen of 2 L and currently on 5 L.  Denies fever, chills, chest pain, abdominal pain, nausea, vomiting, diarrhea, headache or dysuria.  In the ED, patient's vitals showed temperature 97.6, heart rate 90 and blood pressure 155/73.  Patient's labs show troponin 94 and repeat 92, BNP 1100, sodium 135, creatinine 2.1, glucose 252, AST 49 ALT 42, white blood cell 24.2 and hemoglobin 9.0.  UA shows white blood cells with leukocytes and nitrites.  Chest x-ray shows chronic bilateral lung infiltrates.  Patient admitted to floors for further management.    Personal History     Past Medical History:  Past Medical History:   Diagnosis Date    Age-related cognitive decline     Allergic contact dermatitis     Allergies     Anemia     Bedbound     2023 \"MY LEG MUSCLES STOPPED " "WORKING\"    Bronchiectasis with acute lower respiratory infection     Charcot foot due to diabetes mellitus 09/10/2013    Chronic diastolic (congestive) heart failure     Chronic kidney disease     Chronic respiratory failure with hypoxia     Closed supracondylar fracture of femur 01/12/2022    COPD (chronic obstructive pulmonary disease)     Deep vein thrombosis (DVT) of lower extremity associated with air travel 01/13/2023    Dependence on supplemental oxygen     Eczema     Erectile dysfunction     due to organic reasons    Essential (primary) hypertension     Fracture     closed fracture of other tarsal and metatarsal bones    Fracture of proximal humerus 01/13/2023    GERD without esophagitis     High risk medication use     Hypercholesteremia     Hypomagnesemia     Infected stasis ulcer of left lower extremity 01/13/2023    Insomnia     Low back pain     Major depressive disorder     Morbid (severe) obesity due to excess calories     MRSA pneumonia     Muscle weakness     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 without evidence of necrosis     Obstructive sleep apnea (adult) (pediatric)     On home O2     REPORTS WEARING 2L/NC AAT    Other forms of dyspnea     Other long term (current) drug therapy     Other specified noninfective gastroenteritis and colitis     Other spondylosis, lumbar region     Pain in both knees     Paroxysmal atrial fibrillation     Peripheral neuropathy     attributed to type 2 diabetes    Pneumonia, unspecified organism     Polyneuropathy     Rash and other nonspecific skin eruption     Syncope and collapse     Tachycardia     Tinnitus 01/13/2023    Type 1 diabetes mellitus with diabetic chronic kidney disease     Type 2 diabetes mellitus     Unspecified fall, initial encounter     Urinary retention        Past Surgical History:  Past Surgical History:   Procedure Laterality Date    CARDIAC CATHETERIZATION Left 8/15/2024    Procedure: Carbon " dioxide aortogram with left leg angiogram, possible angioplasty or stenting;  Surgeon: Moshe Willson MD;  Location: [facility] CATH INVASIVE LOCATION;  Service: Vascular;  Laterality: Left;    CHOLECYSTECTOMY      CYSTOSCOPY      FEMUR SURGERY Left     Shravan placed    KNEE SURGERY Left     OTHER SURGICAL HISTORY Left     venous port, REMOVED    PORTACATH PLACEMENT Right     TIBIAL PLATEAU OPEN REDUCTION INTERNAL FIXATION Left 2023    Procedure: TIBIAL PLATEAU OPEN REDUCTION INTERNAL FIXATION;  Surgeon: Hugo Kline MD;  Location: [facility] MAIN OR;  Service: Orthopedics;  Laterality: Left;    TONSILLECTOMY AND ADENOIDECTOMY         Family History:   Family History   Problem Relation Age of Onset    Coronary artery disease Mother     Hypertension Mother     Diabetes type II Mother     Asthma Father     Diabetes type II Sister     Cancer Sister        Social History:   Social History     Socioeconomic History    Marital status:    Tobacco Use    Smoking status: Former     Current packs/day: 0.00     Average packs/day: 1 pack/day for 12.0 years (12.0 ttl pk-yrs)     Types: Cigarettes     Start date:      Quit date:      Years since quittin.0     Passive exposure: Past    Smokeless tobacco: Never   Vaping Use    Vaping status: Never Used   Substance and Sexual Activity    Alcohol use: Not Currently    Drug use: Never    Sexual activity: Defer       Home Medications:  DULoxetine, FreeStyle Bethany 3 Plus Sensor, GNP One Daily Plus Iron, Insulin Lispro (1 Unit Dial), Magnesium Oxide -Mg Supplement, NIFEdipine XL, O2, Semaglutide (1 MG/DOSE), Vitamin D3, apixaban, arformoterol, aspirin, atorvastatin, budesonide, bumetanide, busPIRone, calcium citrate, carvedilol, dapagliflozin, docusate sodium, famotidine, ferrous gluconate, finasteride, folic acid, insulin detemir, ipratropium-albuterol, meropenem, montelukast, predniSONE, saccharomyces boulardii, tamsulosin, tiotropium, tobramycin PF,  traZODone, and vitamin C    Allergies:  Allergies   Allergen Reactions    Benadryl [Diphenhydramine] Itching    Proventil [Albuterol] Other (See Comments)     Mouth sores         Review of Systems   All systems were reviewed and negative except for: Above    Objective  Objective     Vitals:   Temp:  [97.6 °F (36.4 °C)] 97.6 °F (36.4 °C)  Heart Rate:  [90-95] 90  Resp:  [20] 20  BP: (154-155)/(73-74) 155/73    Physical Exam    Constitutional: Awake, alert, obese   Eyes: Pupils equal, sclerae anicteric, no conjunctival injection   HENT: NCAT, mucous membranes moist   Neck: Supple, no thyromegaly, no lymphadenopathy, trachea midline   Respiratory: Wheezing bilaterally, crackles noted   Cardiovascular: RRR, no murmurs, rubs, or gallops, palpable pedal pulses bilaterally   Gastrointestinal: Positive bowel sounds, soft, nontender, nondistended   Musculoskeletal: No bilateral ankle edema, no clubbing or cyanosis to extremities   Psychiatric: Appropriate affect, cooperative   Neurologic: Oriented x 3, strength symmetric in all extremities, Cranial Nerves grossly intact to confrontation, speech clear   Skin: No rashes     Result Review   Result Review:  I have personally reviewed the results from the time of this admission to 1/23/2025 20:03 EST and agree with these findings:  [x]  Laboratory  []  Microbiology  [x]  Radiology  []  EKG/Telemetry   []  Cardiology/Vascular   []  Pathology  [x]  Old records  []  Other:      Assessment & Plan  Assessment / Plan     Assessment/Plan:     Assessment  Acute hypoxemic respiratory failure  History of pneumonia with MDRO Pseudomonas  Complicate UTI  Type 2 diabetes on insulin  History of COPD  CKD  Atrial fibrillation on Eliquis  Elevated troponins  Heart failure with preserved ejection fraction  History of BPH  Chronic left heel ulcer  Obstructive sleep apnea    Plan  Patient admitted to [facility]  Telemetry  Monitor vitals  CBC BMP  Troponin trend  Respiratory panel negative  Blood  cultures ordered  Chest x-ray reviewed  CT chest ordered  Echo ordered  Start home meds  Start IV meropenem  IV Solu-Medrol 80 3 times daily  Start home long-acting inhaler  DuoNebs and Pulmicort  IV meropenem      VTE Prophylaxis:  Pharmacologic VTE prophylaxis orders are signed & held. Pharmacologic VTE prophylaxis orders are present.        CODE STATUS:    Code Status (Patient has no pulse and is not breathing): CPR (Attempt to Resuscitate)  Medical Interventions (Patient has pulse or is breathing): Full Support      Admission Status:  I believe this patient meets inpt status.    Electronically signed by Pedrito Fuchs MD, 01/23/25, 8:03 PM EST.             Electronically signed by Pedrito Fuchs MD at 01/23/25 2013          Emergency Department Notes        Jasper Cordova PA-C at 01/23/25 1641          Time: 4:41 PM EST  Date of encounter:  1/23/2025  Independent Historian/Clinical History and Information was obtained by:   Patient    History is limited by: N/A    Chief Complaint: SOA      History of Present Illness:  Patient is a 59 y.o. year old male who presents to the emergency department for evaluation of shortness of breath.  Patient states he has been short of breath almost a week.  He states it worsened today.  Went to [facility] on Friday and was admitted 1/17/25/1/22/25.  He was discharged yesterday.  States he has pneumonia.  He states he is still having shortness of breath and wheezing.  Wears 2 L of oxygen at home at all times.  His home health nurse sent him to the ED. patient is currently on meropenem.  He states he used a breathing treatment PTA.  He is having productive cough of thick white sputum.  Denies leg swelling.      Patient Care Team  Primary Care Provider: Kimmy Riley MD    Past Medical History:     Allergies   Allergen Reactions    Benadryl [Diphenhydramine] Itching    Proventil [Albuterol] Other (See Comments)     Mouth sores       Past Medical History:   Diagnosis  "Date    Age-related cognitive decline     Allergic contact dermatitis     Allergies     Anemia     Bedbound     11/2023 \"MY LEG MUSCLES STOPPED WORKING\"    Bronchiectasis with acute lower respiratory infection     Charcot foot due to diabetes mellitus 09/10/2013    Chronic diastolic (congestive) heart failure     Chronic kidney disease     Chronic respiratory failure with hypoxia     Closed supracondylar fracture of femur 01/12/2022    COPD (chronic obstructive pulmonary disease)     Deep vein thrombosis (DVT) of lower extremity associated with air travel 01/13/2023    Dependence on supplemental oxygen     Eczema     Erectile dysfunction     due to organic reasons    Essential (primary) hypertension     Fracture     closed fracture of other tarsal and metatarsal bones    Fracture of proximal humerus 01/13/2023    GERD without esophagitis     High risk medication use     Hypercholesteremia     Hypomagnesemia     Infected stasis ulcer of left lower extremity 01/13/2023    Insomnia     Low back pain     Major depressive disorder     Morbid (severe) obesity due to excess calories     MRSA pneumonia     Muscle weakness     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 without evidence of necrosis     Obstructive sleep apnea (adult) (pediatric)     On home O2     REPORTS WEARING 2L/NC AAT    Other forms of dyspnea     Other long term (current) drug therapy     Other specified noninfective gastroenteritis and colitis     Other spondylosis, lumbar region     Pain in both knees     Paroxysmal atrial fibrillation     Peripheral neuropathy     attributed to type 2 diabetes    Pneumonia, unspecified organism     Polyneuropathy     Rash and other nonspecific skin eruption     Syncope and collapse     Tachycardia     Tinnitus 01/13/2023    Type 1 diabetes mellitus with diabetic chronic kidney disease     Type 2 diabetes mellitus     Unspecified fall, initial encounter     Urinary retention  "     Past Surgical History:   Procedure Laterality Date    CARDIAC CATHETERIZATION Left 8/15/2024    Procedure: Carbon dioxide aortogram with left leg angiogram, possible angioplasty or stenting;  Surgeon: Moshe Willson MD;  Location: [facility] CATH INVASIVE LOC[facility];  Service: Vascular;  Laterality: Left;    CHOLECYSTECTOMY      CYSTOSCOPY      FEMUR SURGERY Left     Shravan placed    KNEE SURGERY Left     OTHER SURGICAL HISTORY Left     venous port, REMOVED    PORTACATH PLACEMENT Right     TIBIAL PLATEAU OPEN REDUCTION INTERNAL FIXATION Left 12/22/2023    Procedure: TIBIAL PLATEAU OPEN REDUCTION INTERNAL FIXATION;  Surgeon: Hugo Kline MD;  Location: [facility] MAIN OR;  Service: Orthopedics;  Laterality: Left;    TONSILLECTOMY AND ADENOIDECTOMY       Family History   Problem Relation Age of Onset    Coronary artery disease Mother     Hypertension Mother     Diabetes type II Mother     Asthma Father     Diabetes type II Sister     Cancer Sister        Home Medications:  Prior to Admission medications    Medication Sig Start Date End Date Taking? Authorizing Provider   arformoterol (BROVANA) 15 MCG/2ML nebulizer solution Take 2 mL by nebulization 2 (Two) Times a Day. 8/22/24   Betzaida Nelson APRN   Aspirin Low Dose 81 MG EC tablet TAKE 1 TABLET BY MOUTH EVERY MORNING 11/25/24   Kimmy Riley MD   atorvastatin (LIPITOR) 20 MG tablet TAKE 1 TABLET BY MOUTH EVERY NIGHT AT BEDTIME 11/25/24   Kimmy Riley MD   B-D UF III MINI PEN NEEDLES 31G X 5 MM misc USE FOUR TIMES DAILY BEFORE MEALS AND AT BEDTIME 12/3/24   Provider, MD Breann   budesonide (Pulmicort) 0.5 MG/2ML nebulizer solution Take 2 mL by nebulization 2 (Two) Times a Day. 8/22/24   Betzaida Nelson APRN   bumetanide (BUMEX) 2 MG tablet TAKE 1 TABLET BY MOUTH TWICE DAILY 11/25/24   Kimmy Riley MD   busPIRone (BUSPAR) 15 MG tablet TAKE 1 TABLET BY MOUTH TWICE DAILY 11/25/24   Kimmy Riley MD   calcium citrate  (CALCITRATE) 950 (200 Ca) MG tablet TAKE 1 TABLET BY MOUTH EVERY NIGHT AT BEDTIME 11/25/24   Kimmy Riley MD   carvedilol (COREG) 25 MG tablet TAKE 1 TABLET BY MOUTH EVERY TWELVE HOURS 11/25/24   Kimmy Riley MD   Cholecalciferol (Vitamin D3) 50 MCG (2000 UT) tablet TAKE 1 TABLET BY MOUTH EVERY MORNING 11/25/24   Kimmy Riley MD   Continuous Glucose Sensor (FreeStyle Bethany 3 Plus Sensor) Use 2 each Every 30 (Thirty) Days. Apply as directed every 15 days 12/11/24   Neli Paredes APRN   docusate sodium (COLACE) 100 MG capsule TAKE 1 CAPSULE BY MOUTH TWICE DAILY 11/25/24   Kimmy Riley MD   DULoxetine (CYMBALTA) 60 MG capsule TAKE 1 CAPSULE BY MOUTH ONCE DAILY 12/30/24   Kimym Riley MD   Eliquis 5 MG tablet tablet TAKE 1 TABLET BY MOUTH EVERY TWELVE HOURS 11/25/24   Kimmy Riley MD   famotidine (PEPCID) 40 MG tablet TAKE 1 TABLET BY MOUTH EVERY MORNING 11/25/24   Kimmy Riley MD   Farxiga 5 MG tablet tablet TAKE 1 TABLET BY MOUTH EVERY MORNING 11/25/24   Kimmy Riley MD   ferrous gluconate (FERGON) 324 MG tablet TAKE 1 TABLET BY MOUTH EVERY MORNING 11/25/24   Kimmy Riley MD   finasteride (PROSCAR) 5 MG tablet TAKE 1 TABLET BY MOUTH EVERY NIGHT AT BEDTIME 11/25/24   Kimmy Riley MD   folic acid (FOLVITE) 1 MG tablet TAKE 1 TABLET BY MOUTH EVERY NIGHT AT BEDTIME 11/25/24   Kimmy Riley MD   insulin detemir (Levemir) 100 UNIT/ML injection Inject 15 Units under the skin into the appropriate area as directed Every Night for 180 days. 12/11/24 6/9/25  Neli Paredes APRN   Insulin Lispro, 1 Unit Dial, (HUMALOG) 100 UNIT/ML solution pen-injector inject EIGHT units under the skin INTO THE APPROPRIATE AREA THREE TIMES DAILY BEFORE meals PER sliding scale 6/28/24   Kimmy Riley MD   ipratropium-albuterol (DUO-NEB) 0.5-2.5 mg/3 ml nebulizer Take 3 mL by nebulization Every 4 (Four) Hours As Needed for Wheezing or  Shortness of Air. 24   Betzaida Nelson APRN   Isopropyl Myristate solution Apply 1 Application topically to the appropriate area as directed. 3/13/24   Breann Shukla MD   Ketoconazole 1 % shampoo Apply 10 mL topically Every 3 (Three) Days. 24   Kimmy Riley MD   magnesium oxide (MAG-OX) 400 MG tablet TAKE 1 TABLET BY MOUTH EVERY NIGHT AT BEDTIME 24   Kimmy Riley MD   montelukast (SINGULAIR) 10 MG tablet Take 1 tablet by mouth Every Night. 24   Betzaida Nelson APRN   Multiple Vitamins-Iron (GNP One Daily Plus Iron) tablet TAKE 1 TABLET BY MOUTH EVERY MORNING 24   Kimmy Riley MD   NIFEdipine XL (PROCARDIA XL) 30 MG 24 hr tablet TAKE 1 TABLET BY MOUTH EVERY MORNING 24   Kimmy Riley MD   O2 (OXYGEN) 2 Liter O2 - CONTINUOUS (route: Oxygen) 22   ProviderBreann MD   Semaglutide, 1 MG/DOSE, (Ozempic, 1 MG/DOSE,) 4 MG/3ML solution pen-injector Inject 1 mg under the skin into the appropriate area as directed 1 (One) Time Per Week. 24   Neli Paredes APRN   tamsulosin (FLOMAX) 0.4 MG capsule 24 hr capsule TAKE 1 CAPSULE BY MOUTH EVERY NIGHT AT BEDTIME 24   Kimmy Riley MD   tiotropium (SPIRIVA) 18 MCG per inhalation capsule Place 1 capsule into inhaler and inhale Daily. 10/31/24   Martín Rivera MD   traZODone (DESYREL) 100 MG tablet Take 1.5 tablets by mouth Every Night. 24   Kimmy Riley MD   vitamin C (ASCORBIC ACID) 500 MG tablet TAKE 1 TABLET BY MOUTH TWICE DAILY 24   Kimmy Riley MD        Social History:   Social History     Tobacco Use    Smoking status: Former     Current packs/day: 0.00     Average packs/day: 1 pack/day for 12.0 years (12.0 ttl pk-yrs)     Types: Cigarettes     Start date:      Quit date:      Years since quittin.0     Passive exposure: Past    Smokeless tobacco: Never   Vaping Use    Vaping status: Never Used   Substance Use Topics     "Alcohol use: Not Currently    Drug use: Never         Review of Systems:  Review of Systems   Constitutional: Negative.  Negative for fever.   HENT: Negative.     Eyes: Negative.    Respiratory:  Positive for cough, shortness of breath and wheezing.    Cardiovascular: Negative.  Negative for leg swelling.   Gastrointestinal: Negative.    Endocrine: Negative.    Genitourinary: Negative.    Musculoskeletal: Negative.    Skin: Negative.    Allergic/Immunologic: Negative.    Neurological: Negative.    Hematological: Negative.    Psychiatric/Behavioral: Negative.          Physical Exam:  /73   Pulse 90   Temp 97.6 °F (36.4 °C) (Oral)   Resp 20   Ht 175.3 cm (69\")   Wt 123 kg (270 lb 4.5 oz)   SpO2 100%   BMI 39.91 kg/m²     Physical Exam  Vitals and nursing note reviewed.   Constitutional:       Appearance: Normal appearance. He is obese.   HENT:      Head: Normocephalic and atraumatic.      Right Ear: Tympanic membrane normal.      Left Ear: Tympanic membrane normal.      Nose: Nose normal.      Mouth/Throat:      Mouth: Mucous membranes are moist.   Eyes:      Extraocular Movements: Extraocular movements intact.      Conjunctiva/sclera: Conjunctivae normal.      Pupils: Pupils are equal, round, and reactive to light.   Cardiovascular:      Rate and Rhythm: Normal rate and regular rhythm.      Heart sounds: Normal heart sounds.   Pulmonary:      Effort: Pulmonary effort is normal. Tachypnea and accessory muscle usage present.      Breath sounds: Examination of the right-upper field reveals wheezing. Examination of the left-upper field reveals wheezing. Examination of the right-middle field reveals wheezing. Examination of the left-middle field reveals wheezing. Examination of the right-lower field reveals wheezing. Examination of the left-lower field reveals wheezing. Wheezing present.   Musculoskeletal:         General: Normal range of motion.      Cervical back: Normal range of motion and neck supple. "   Skin:     General: Skin is warm and dry.      Coloration: Skin is not cyanotic.   Neurological:      General: No focal deficit present.      Mental Status: He is alert and oriented to person, place, and time.   Psychiatric:         Attention and Perception: Attention and perception normal.         Mood and Affect: Mood normal.         Behavior: Behavior normal.                  Medical Decision Making:      Comorbidities that affect care:    Sleep apnea, diabetes, Atrial Fibrillation, Chronic Kidney Disease, COPD, Obesity    External Notes reviewed:    Previous ED Note: Patient was seen at [place] [address] and admitted.  Respiratory testing showed new Pseudomonas, achromobacter bacteria and coronavirus.  He was discharged from a [facility] with 6 more doses of meropenem.  Patient had chest x-ray completed 1/17/2024 showing persistent diffuse airspace opacity in the upper and lower lung zones.  Cystic bronchiectasis is redemonstrated.  Small right pleural effusion and Previous Labs: CBC from 1/17/2025 showing WBC of 17.  On 1/18/2025 is elevated at 18.  On 1/21/2025 it was 18. Pneumonia panel PCR panel from 1/17/2025 for Pseudomonas and CTX M as well as positive for COVID      The following orders were placed and all results were independently analyzed by me:  Orders Placed This Encounter   Procedures    COVID-19, FLU A/B, RSV PCR 1 HR TAT - Swab, Nasopharynx    Blood Culture - Blood,    Blood Culture - Blood,    Respiratory Culture - Sputum, Cough    XR Chest 1 View    CT Chest Without Contrast Diagnostic    [facility] Draw    Comprehensive Metabolic Panel    BNP    High Sensitivity Troponin T    CBC Auto Differential    Lactic Acid, Plasma    High Sensitivity Troponin T 1Hr    Urinalysis With Microscopic If Indicated (No Culture) - Urine, Catheter    Urinalysis, Microscopic Only - Urine, Clean Catch    Blood Gas, Arterial -    High Sensitivity Troponin T    Basic Metabolic Panel    Diet: Cardiac; No Salt  Packet; Fluid Consistency: Thin (IDDSI 0)    Undress & Gown    Continuous Pulse Oximetry    Vital Signs    Vital Signs    Intake & Output    Weigh Patient    Oral Care    Maintain IV Access    Telemetry - Place Orders & Notify Provider of Results When Patient Experiences Acute Chest Pain, Dysrhythmia or Respiratory Distress    Code Status and Medical Interventions: CPR (Attempt to Resuscitate); Full Support    Inpatient Hospitalist Consult    Oxygen Therapy- Nasal Cannula; Titrate 1-6 LPM Per SpO2; 90 - 95%    POC Glucose Once    POC Glucose 4x Daily Before Meals & at Bedtime    ECG 12 Lead ED Triage Standing Order; SOA    Adult Transthoracic Echo Complete W/ Cont if Necessary Per Protocol    Wound Ostomy Eval & Treat    Insert Peripheral IV    Insert Peripheral IV    Inpatient Admission    CBC & Differential    Green Top (Gel)    Lavender Top    Gold Top - SST    Light Blue Top    CBC & Differential       Medications Given in the Emergency Department:  Medications   sodium chloride 0.9 % flush 10 mL (has no administration in time range)   ipratropium-albuterol (DUO-NEB) nebulizer solution 3 mL (has no administration in time range)   sodium chloride 0.9 % flush 10 mL (has no administration in time range)   sodium chloride 0.9 % flush 10 mL (has no administration in time range)   sodium chloride 0.9 % infusion 40 mL (has no administration in time range)   heparin (porcine) 5000 UNIT/ML injection 5,000 Units (has no administration in time range)   nitroglycerin (NITROSTAT) SL tablet 0.4 mg (has no administration in time range)   sennosides-docusate (PERICOLACE) 8.6-50 MG per tablet 2 tablet (has no administration in time range)     And   polyethylene glycol (MIRALAX) packet 17 g (has no administration in time range)     And   bisacodyl (DULCOLAX) EC tablet 5 mg (has no administration in time range)     And   bisacodyl (DULCOLAX) suppository 10 mg (has no administration in time range)   methylPREDNISolone sodium  succinate (SOLU-Medrol) 40 mg in sterile water (preservative free) 1 mL (has no administration in time range)   budesonide (PULMICORT) nebulizer solution 0.5 mg (has no administration in time range)   ipratropium (ATROVENT) nebulizer solution 0.5 mg (has no administration in time range)   dextrose (GLUTOSE) oral gel 15 g (has no administration in time range)   dextrose (D50W) (25 g/50 mL) IV injection 25 g (has no administration in time range)   glucagon (GLUCAGEN) injection 1 mg (has no administration in time range)   Insulin Lispro (humaLOG) injection 2-7 Units (has no administration in time range)   meropenem (MERREM) 1,000 mg in sodium chloride 0.9 % 100 mL IVPB-VTB (has no administration in time range)   sodium chloride 0.9 % bolus 3,690 mL (3,690 mL Intravenous New Bag 1/23/25 1738)   piperacillin-tazobactam (ZOSYN) IVPB 3.375 g IVPB in 100 mL NS (VTB) (0 g Intravenous Stopped 1/23/25 1834)   methylPREDNISolone sodium succinate (SOLU-Medrol) injection 125 mg (125 mg Intravenous Given 1/23/25 1752)        ED Course:    ED Course as of 01/23/25 2007   Thu Jan 23, 2025   1900 Discussed meropenem with Maria Isabel the pharmacist.  States his next dosage would be around midnight.  Patient had a pneumonia panel and was positive for ESBL and Pseudomonas [AJ]      ED Course User Index  [AJ] Jasper Cordova, PAMARIAMA       Labs:    Lab Results (last 24 hours)       Procedure Component Value Units Date/Time    CBC & Differential [[id number]]  (Abnormal) Collected: 01/23/25 1614    Specimen: Blood from Arm, Right Updated: 01/23/25 1622    Narrative:      The following orders were created for panel order CBC & Differential.  Procedure                               Abnormality         Status                     ---------                               -----------         ------                     CBC Auto Differential[[id number]]        Abnormal            Final result                 Please view results for these tests on the  individual orders.    Comprehensive Metabolic Panel [[id number]]  (Abnormal) Collected: 01/23/25 1614    Specimen: Blood from Arm, Right Updated: 01/23/25 1643     Glucose 252 mg/dL      BUN 50 mg/dL      Creatinine 2.16 mg/dL      Sodium 135 mmol/L      Potassium 4.9 mmol/L      Chloride 94 mmol/L      CO2 30.4 mmol/L      Calcium 8.6 mg/dL      Total Protein 7.9 g/dL      Albumin 3.1 g/dL      ALT (SGPT) 42 U/L      AST (SGOT) 49 U/L      Alkaline Phosphatase 200 U/L      Total Bilirubin 0.2 mg/dL      Globulin 4.8 gm/dL      A/G Ratio 0.6 g/dL      BUN/Creatinine Ratio 23.1     Anion Gap 10.6 mmol/L      eGFR 34.4 mL/min/1.73     Narrative:      GFR Categories in Chronic Kidney Disease (CKD)      GFR Category          GFR (mL/min/1.73)    Interpretation  G1                     90 or greater         Normal or high (1)  G2                      60-89                Mild decrease (1)  G3a                   45-59                Mild to moderate decrease  G3b                   30-44                Moderate to severe decrease  G4                    15-29                Severe decrease  G5                    14 or less           Kidney failure          (1)In the absence of evidence of kidney disease, neither GFR category G1 or G2 fulfill the criteria for CKD.    eGFR calculation 2021 CKD-EPI creatinine equation, which does not include race as a factor    BNP [[id number]]  (Abnormal) Collected: 01/23/25 1614    Specimen: Blood from Arm, Right Updated: 01/23/25 1650     proBNP 1,193.0 pg/mL     Narrative:      This assay is used as an aid in the diagnosis of individuals suspected of having heart failure. It can be used as an aid in the diagnosis of acute decompensated heart failure (ADHF) in patients presenting with signs and symptoms of ADHF to the emergency department (ED). In addition, NT-proBNP of <300 pg/mL indicates ADHF is not likely.    Age Range Result Interpretation  NT-proBNP Concentration (pg/mL:      <50              Positive            >450                   Gray                 300-450                    Negative             <300    50-75           Positive            >900                  Gray                300-900                  Negative            <300      >75             Positive            >1800                  Gray                300-1800                  Negative            <300    High Sensitivity Troponin T [[id number]]  (Abnormal) Collected: 01/23/25 1614    Specimen: Blood from Arm, Right Updated: 01/23/25 1712     HS Troponin T 94 ng/L     Narrative:      High Sensitive Troponin T Reference Range:  <14.0 ng/L- Negative Female for AMI  <22.0 ng/L- Negative Male for AMI  >=14 - Abnormal Female indicating possible myocardial injury.  >=22 - Abnormal Male indicating possible myocardial injury.   Clinicians would have to utilize clinical acumen, EKG, Troponin, and serial changes to determine if it is an Acute Myocardial Infarction or myocardial injury due to an underlying chronic condition.         CBC Auto Differential [[id number]]  (Abnormal) Collected: 01/23/25 1614    Specimen: Blood from Arm, Right Updated: 01/23/25 1622     WBC 24.28 10*3/mm3      RBC 3.29 10*6/mm3      Hemoglobin 9.0 g/dL      Hematocrit 30.2 %      MCV 91.8 fL      MCH 27.4 pg      MCHC 29.8 g/dL      RDW 14.0 %      RDW-SD 46.1 fl      MPV 8.3 fL      Platelets 419 10*3/mm3      Neutrophil % 87.9 %      Lymphocyte % 4.8 %      Monocyte % 5.3 %      Eosinophil % 0.9 %      Basophil % 0.2 %      Immature Grans % 0.9 %      Neutrophils, Absolute 21.34 10*3/mm3      Lymphocytes, Absolute 1.17 10*3/mm3      Monocytes, Absolute 1.28 10*3/mm3      Eosinophils, Absolute 0.22 10*3/mm3      Basophils, Absolute 0.05 10*3/mm3      Immature Grans, Absolute 0.22 10*3/mm3      nRBC 0.0 /100 WBC     COVID-19, FLU A/B, RSV PCR 1 HR TAT - Swab, Nasopharynx [[id number]]  (Normal) Collected: 01/23/25 1723    Specimen: Swab from Nasopharynx Updated: 01/23/25  1806     COVID19 Not Detected     Influenza A PCR Not Detected     Influenza B PCR Not Detected     RSV, PCR Not Detected    Narrative:      Fact sheet for providers: https://www.fda.gov/media/657654/download    Fact sheet for patients: https://www.fda.gov/media/893295/download    Test performed by PCR.    Blood Culture - Blood, Hand, Left [[id number]] Collected: 01/23/25 1735    Specimen: Blood from Hand, Left Updated: 01/23/25 1738    Lactic Acid, Plasma [[id number]]  (Normal) Collected: 01/23/25 1735    Specimen: Blood from Hand, Left Updated: 01/23/25 1802     Lactate 0.9 mmol/L     High Sensitivity Troponin T 1Hr [[id number]]  (Abnormal) Collected: 01/23/25 1735    Specimen: Blood from Hand, Left Updated: 01/23/25 1927     HS Troponin T 92 ng/L      Troponin T Numeric Delta -2 ng/L      Troponin T % Delta -2    Narrative:      High Sensitive Troponin T Reference Range:  <14.0 ng/L- Negative Female for AMI  <22.0 ng/L- Negative Male for AMI  >=14 - Abnormal Female indicating possible myocardial injury.  >=22 - Abnormal Male indicating possible myocardial injury.   Clinicians would have to utilize clinical acumen, EKG, Troponin, and serial changes to determine if it is an Acute Myocardial Infarction or myocardial injury due to an underlying chronic condition.         Blood Culture - Blood, Arm, Right [[id number]] Collected: 01/23/25 1748    Specimen: Blood from Arm, Right Updated: 01/23/25 1751    Urinalysis With Microscopic If Indicated (No Culture) - Indwelling Urethral Catheter [[id number]]  (Abnormal) Collected: 01/23/25 1912    Specimen: Urine from Indwelling Urethral Catheter Updated: 01/23/25 1930     Color, UA Yellow     Appearance, UA Turbid     pH, UA 5.5     Specific Gravity, UA 1.017     Glucose, UA >=1000 mg/dL (3+)     Ketones, UA Negative     Bilirubin, UA Negative     Blood, UA Large (3+)     Protein, UA >=300 mg/dL (3+)     Leuk Esterase, UA Moderate (2+)     Nitrite, UA Negative     Urobilinogen, UA  0.2 E.U./dL    Urinalysis, Microscopic Only - Indwelling Urethral Catheter [[id number]]  (Abnormal) Collected: 01/23/25 1912    Specimen: Urine from Indwelling Urethral Catheter Updated: 01/23/25 1944     RBC, UA 3-5 /HPF      WBC, UA Too Numerous to Count /HPF      Bacteria, UA 3+ /HPF      Squamous Epithelial Cells, UA 3-6 /HPF      Yeast, UA Large/3+ Budding Yeast /HPF      Hyaline Casts, UA None Seen /LPF      Granular Casts, UA 3-6 /LPF      Methodology Manual Light Microscopy    POC Glucose Once [[id number]]  (Abnormal) Collected: 01/23/25 1955    Specimen: Blood Updated: 01/23/25 1957     Glucose 205 mg/dL      Comment: Serial Number: [id number]:  [id number]                Imaging:    XR Chest 1 View    Result Date: 1/23/2025  XR CHEST 1 VW Date of Exam: 1/23/2025 4:47 PM EST Indication: SOA Triage Protocol Comparison: Chest AP dated 12/5/2024 Findings: There is an abandoned segment of a left subclavian central venous catheter measuring 17.3 cm in length. A right subclavian port infusion catheter terminates with the tip in the proximal superior vena cava. Patchy airspace opacities are again noted in the lung fields bilaterally, most predominantly in the mid to upper right lung field. Patient is rotated to the right. Heart size is favored to remain within normal limits. No definite effusion is seen.     Impression: Patchy chronic bilateral lung infiltrates. No acute infiltrate. Electronically Signed: Amado Salmeron MD  1/23/2025 5:11 PM EST  Workstation ID: [id number]       Differential Diagnosis and Discussion:    Dyspnea: Differential diagnosis includes but is not limited to metabolic acidosis, neurological disorders, psychogenic, asthma, pneumothorax, upper airway obstruction, COPD, pneumonia, noncardiogenic pulmonary edema, interstitial lung disease, anemia, congestive heart failure, and pulmonary embolism    PROCEDURES:    Labs were collected in the emergency department and all labs were reviewed and  interpreted by me.  X-ray were performed in the emergency department and all X-ray impressions were independently interpreted by me.  An EKG was performed and the EKG was interpreted by me.  An EKG was performed and the EKG was interpreted by supervising attending.    ECG 12 Lead ED Triage Standing Order; SOA   Preliminary Result   HEART RATE=95  bpm   RR Orzfwkbc=402  ms   MT Hxvsycoe=244  ms   P Horizontal Axis=38  deg   P Front Axis=35  deg   QRSD Hdnyljyn=246  ms   QT Dymvbbpl=772  ms   DHpO=733  ms   QRS Axis=47  deg   T Wave Axis=190  deg   - ABNORMAL ECG -   Sinus rhythm   Nonspecific intraventricular conduction delay   Abnormal T, consider ischemia, lateral leads   When compared with ECG of 14-Apr-2024 19:09:06,   Significant rate increase   Significant repolarization change   Date and Time of Study:2025-01-23 16:07:10          Procedures    MDM           Total Critical Care time of 35 minutes. Total critical care time documented does not include time spent on separately billed procedures for services of nurses or physician assistants. I personally saw and examined the patient. I have reviewed all diagnostic interpretations and treatment plans as written. I was present for the key portions of any procedures performed and the inclusive time noted in any critical care statement. Critical care time includes patient management by me, time spent at the patients bedside,  time to review lab and imaging results, discussing patient care, documentation in the medical record, and time spent with family or caregiver.          Patient Care Considerations:    SEPSIS was considered but is NOT present in the emergency department as SIRS criteria is not present.      Consultants/Shared Management Plan:    Hospitalist: I have discussed the case with Dr. Fuchs who agrees to accept the patient for admission.    Social Determinants of Health:    Patient is independent, reliable, and has access to care.       Disposition  and Care Coordination:    Admit:   Through independent evaluation of the patient's history, physical, and imperical data, the patient meets criteria for inpatient admission to the hospital.        Final diagnoses:   Pneumonia due to Pseudomonas species, unspecified laterality, unspecified part of lung   Urinary tract infection associated with indwelling urethral catheter, initial encounter        ED Disposition       ED Disposition   Decision to Admit    Condition   --    Comment   Level of Care: Telemetry [5]   Diagnosis: PNA (pneumonia) [[id number]]   Admitting Physician: CARMEN COLLAZO [[id number]]   Certification: I Certify That Inpatient Hospital Services Are Medically Necessary For Greater Than 2 Midnights                 This medical record created using voice recognition software.             Jasper Cordova PA-C  01/23/25 2007      Electronically signed by Jasper Cordova PA-C at 01/23/25 2007       Facility-Administered Medications as of 1/23/2025   Medication Dose Route Frequency Provider Last Rate Last Admin    sennosides-docusate (PERICOLACE) 8.6-50 MG per tablet 2 tablet  2 tablet Oral BID PRN Carmen Collazo MD        And    polyethylene glycol (MIRALAX) packet 17 g  17 g Oral Daily PRN Carmen Collazo MD        And    bisacodyl (DULCOLAX) EC tablet 5 mg  5 mg Oral Daily PRN Carmen Collazo MD        And    bisacodyl (DULCOLAX) suppository 10 mg  10 mg Rectal Daily PRN Carmen Collazo MD        budesonide (PULMICORT) nebulizer solution 0.5 mg  0.5 mg Nebulization BID - RT Carmen Collazo MD   0.5 mg at 01/23/25 2030    dextrose (D50W) (25 g/50 mL) IV injection 25 g  25 g Intravenous Q15 Min PRN Carmen Collazo MD        dextrose (GLUTOSE) oral gel 15 g  15 g Oral Q15 Min PRN Carmen Collazo MD        furosemide (LASIX) injection 40 mg  40 mg Intravenous Once Carmen Collazo MD        glucagon (GLUCAGEN) injection 1 mg  1 mg Intramuscular Q15 Min PRN Carmen Collazo MD         guaiFENesin (MUCINEX) 12 hr tablet 600 mg  600 mg Oral Q12H Pedrito Fuchs MD        heparin (porcine) 5000 UNIT/ML injection 5,000 Units  5,000 Units Subcutaneous Q8H Pedrito Fuchs MD        Insulin Lispro (humaLOG) injection 2-7 Units  2-7 Units Subcutaneous 4x Daily AC & at Bedtime Pedrito Fuchs MD        ipratropium (ATROVENT) nebulizer solution 0.5 mg  0.5 mg Nebulization Q4H PRN Pedrito Fuhcs MD        [COMPLETED] ipratropium-albuterol (DUO-NEB) nebulizer solution 3 mL  3 mL Nebulization Once Jasper Cordova PA-C   3 mL at 01/23/25 2031    [START ON 1/24/2025] meropenem (MERREM) 1,000 mg in sodium chloride 0.9 % 100 mL IVPB-VTB  1,000 mg Intravenous Q12H Pedrito Fuchs MD        [START ON 1/24/2025] methylPREDNISolone sodium succinate (SOLU-Medrol) 40 mg in sterile water (preservative free) 1 mL  40 mg Intravenous Q8H Pedrito Fuchs MD        [COMPLETED] methylPREDNISolone sodium succinate (SOLU-Medrol) injection 125 mg  125 mg Intravenous Once Jasper Cordova PA-C   125 mg at 01/23/25 1752    nitroglycerin (NITROSTAT) SL tablet 0.4 mg  0.4 mg Sublingual Q5 Min PRN Pedrito Fuchs MD        [COMPLETED] piperacillin-tazobactam (ZOSYN) IVPB 3.375 g IVPB in 100 mL NS (VTB)  3.375 g Intravenous Once Jasper Cordova PA-C   Stopped at 01/23/25 1834    [COMPLETED] sodium chloride 0.9 % bolus 3,690 mL  30 mL/kg Intravenous Once Jasper Cordova PA-C   Stopped at 01/23/25 2034    sodium chloride 0.9 % flush 10 mL  10 mL Intravenous PRN Reg Ring DO        sodium chloride 0.9 % flush 10 mL  10 mL Intravenous Q12H Pedrito Fuchs MD        sodium chloride 0.9 % flush 10 mL  10 mL Intravenous PRN Pedrito Fuchs MD        sodium chloride 0.9 % infusion 40 mL  40 mL Intravenous PRN Pedrito Fuchs MD         Orders (active)        Start     Ordered    01/24/25 0800  Oral Care  2 Times Daily       01/23/25 1959 01/24/25 0600  CBC & Differential  Morning  Draw         01/23/25 2000 01/24/25 0600  High Sensitivity Troponin T  Morning Draw         01/23/25 2000 01/24/25 0600  Basic Metabolic Panel  Morning Draw         01/23/25 2000 01/24/25 0200  methylPREDNISolone sodium succinate (SOLU-Medrol) 40 mg in sterile water (preservative free) 1 mL  Every 8 Hours         01/23/25 2000 01/24/25 0000  meropenem (MERREM) 1,000 mg in sodium chloride 0.9 % 100 mL IVPB-VTB  Every 12 Hours         01/23/25 2003 01/23/25 2200  heparin (porcine) 5000 UNIT/ML injection 5,000 Units  Every 8 Hours Scheduled         01/23/25 1959 01/23/25 2200  POC Glucose 4x Daily Before Meals & at Bedtime  4 Times Daily Before Meals & at Bedtime      Comments: Complete no more than 45 minutes prior to patient eating      01/23/25 2001 01/23/25 2130  budesonide (PULMICORT) nebulizer solution 0.5 mg  2 Times Daily - RT         01/23/25 2000 01/23/25 2130  Chest Physiotherapy (PD & P)  2 Times Daily - RT       01/23/25 2012 01/23/25 2100  sodium chloride 0.9 % flush 10 mL  Every 12 Hours Scheduled         01/23/25 1959 01/23/25 2100  Insulin Lispro (humaLOG) injection 2-7 Units  4 Times Daily Before Meals & Nightly         01/23/25 2001 01/23/25 2100  guaiFENesin (MUCINEX) 12 hr tablet 600 mg  Every 12 Hours Scheduled         01/23/25 2012 01/23/25 2030  furosemide (LASIX) injection 40 mg  Once         01/23/25 2010 01/23/25 2009  NIPPV (CPAP or BIPAP)  Until Discontinued         01/23/25 2008 01/23/25 2007  Wound Ostomy Eval & Treat  Once         01/23/25 2006 01/23/25 2004  Adult Transthoracic Echo Complete W/ Cont if Necessary Per Protocol  Once         01/23/25 2003 01/23/25 2004  CT Chest Without Contrast Diagnostic  1 Time Imaging         01/23/25 2003 01/23/25 2002  Diet: Cardiac; No Salt Packet; Fluid Consistency: Thin (IDDSI 0)  Diet Effective Now         01/23/25 2001 01/23/25 2000  dextrose (GLUTOSE) oral gel 15 g  Every 15 Minutes PRN       "   01/23/25 2001 01/23/25 2000  dextrose (D50W) (25 g/50 mL) IV injection 25 g  Every 15 Minutes PRN         01/23/25 2001 01/23/25 2000  glucagon (GLUCAGEN) injection 1 mg  Every 15 Minutes PRN         01/23/25 2001 01/23/25 2000  Vital Signs  Every 4 Hours       01/23/25 1959 01/23/25 1959  ipratropium (ATROVENT) nebulizer solution 0.5 mg  Every 4 Hours PRN         01/23/25 2000 01/23/25 1959  Intake & Output  Every Shift       01/23/25 1959 01/23/25 1959  Weigh Patient  Once         01/23/25 1959 01/23/25 1959  Insert Peripheral IV  Once         01/23/25 1959 01/23/25 1959  Code Status and Medical Interventions: CPR (Attempt to Resuscitate); Full Support  Continuous         01/23/25 1959 01/23/25 1959  Inpatient Admission  Once         01/23/25 1959 01/23/25 1959  Continuous Cardiac Monitoring  Continuous        Comments: Follow Standing Orders As Outlined in Process Instructions (Open Order Report to View Full Instructions)    01/23/25 1959 01/23/25 1959  Maintain IV Access  Continuous         01/23/25 1959 01/23/25 1959  Telemetry - Place Orders & Notify Provider of Results When Patient Experiences Acute Chest Pain, Dysrhythmia or Respiratory Distress  Continuous        Comments: Open Order Report to View Parameters Requiring Provider Notification    01/23/25 1959 01/23/25 1958  bisacodyl (DULCOLAX) suppository 10 mg  Daily PRN        Placed in \"And\" Linked Group    01/23/25 1959 01/23/25 1958  sennosides-docusate (PERICOLACE) 8.6-50 MG per tablet 2 tablet  2 Times Daily PRN        Placed in \"And\" Linked Group    01/23/25 1959 01/23/25 1958  polyethylene glycol (MIRALAX) packet 17 g  Daily PRN        Placed in \"And\" Linked Group    01/23/25 1959 01/23/25 1958  bisacodyl (DULCOLAX) EC tablet 5 mg  Daily PRN        Placed in \"And\" Linked Group    01/23/25 1959    01/23/25 1958  nitroglycerin (NITROSTAT) SL tablet 0.4 mg  Every 5 Minutes PRN         01/23/25 1959    " 01/23/25 1958  sodium chloride 0.9 % flush 10 mL  As Needed         01/23/25 1959 01/23/25 1958  sodium chloride 0.9 % infusion 40 mL  As Needed         01/23/25 1959 01/23/25 1925  Respiratory Culture - Sputum, Cough  Once         01/23/25 1924 01/23/25 1907  Inpatient Hospitalist Consult  Once        Specialty:  Hospitalist  Provider:  Pedrito Fuchs MD    01/23/25 1906 01/23/25 1645  Blood Culture - Blood, Hand, Left  Once         01/23/25 1646    01/23/25 1645  Blood Culture - Blood, Arm, Right  Once         01/23/25 1646    01/23/25 1601  Insert Peripheral IV  Once         01/23/25 1601    01/23/25 1600  sodium chloride 0.9 % flush 10 mL  As Needed         01/23/25 1601    01/22/25 0000  predniSONE (DELTASONE) 20 MG tablet  Daily With Lunch         01/23/25 1858    01/22/25 0000  saccharomyces boulardii (FLORASTOR) 250 MG capsule  Daily         01/23/25 1903    01/22/25 0000  meropenem (MERREM) 1 g injection  Every 12 Hours Scheduled         01/23/25 1906    01/02/25 0000  Magnesium Oxide -Mg Supplement 400 (240 Mg) MG tablet  Every Night at Bedtime         01/23/25 1847    Unscheduled  Oxygen Therapy- Nasal Cannula; Titrate 1-6 LPM Per SpO2; 90 - 95%  Continuous PRN       01/23/25 1601    Unscheduled  Follow Hypoglycemia Standing Orders For Blood Glucose <70 & Notify Provider of Treatment  As Needed      Comments: Follow Hypoglycemia Orders As Outlined in Process Instructions (Open Order Report to View Full Instructions)  Notify Provider Any Time Hypoglycemia Treatment is Administered    01/23/25 2001    --  tobramycin PF (MOIZ) 300 MG/5ML nebulizer solution  2 Times Daily - RT         01/23/25 1858    Signed and Held  aspirin EC tablet 81 mg  Every Morning         Signed and Held    Signed and Held  atorvastatin (LIPITOR) tablet 20 mg  Daily         Signed and Held    Signed and Held  budesonide (PULMICORT) nebulizer solution 0.5 mg  2 Times Daily         Signed and Held    Signed and Held   bumetanide (BUMEX) tablet 2 mg  2 Times Daily         Signed and Held    Signed and Held  busPIRone (BUSPAR) tablet 15 mg  2 Times Daily         Signed and Held    Signed and Held  carvedilol (COREG) tablet 25 mg  Every 12 Hours         Signed and Held    Signed and Held  DULoxetine (CYMBALTA) DR capsule 60 mg  Daily         Signed and Held    Signed and Held  apixaban (ELIQUIS) tablet 5 mg  Every 12 Hours Scheduled         Signed and Held    Signed and Held  famotidine (PEPCID) tablet 40 mg  Every Morning         Signed and Held    Signed and Held  ferrous sulfate tablet 325 mg  Daily With Breakfast         Signed and Held    Signed and Held  insulin glargine (LANTUS, SEMGLEE) injection 15 Units  Nightly         Signed and Held    Signed and Held  montelukast (SINGULAIR) tablet 10 mg  Nightly         Signed and Held    Signed and Held  NIFEdipine XL (PROCARDIA XL) 24 hr tablet 30 mg  Every Morning         Signed and Held    Signed and Held  revefenacin (YUPELRI) nebulizer solution 175 mcg  Daily - RT         Signed and Held    Signed and Held  tamsulosin (FLOMAX) 24 hr capsule 0.4 mg  Daily         Signed and Held

## 2025-01-24 NOTE — SIGNIFICANT NOTE
" Wound Eval / Progress Noted    YAIMA Stockton     Patient Name: Preston Wallis  : 1965  MRN: [id number]  Today's Date: 2025                 Admit Date: 2025    Visit Dx:    ICD-10-CM ICD-9-CM   1. Pneumonia due to Pseudomonas species, unspecified laterality, unspecified part of lung  J15.1 482.1   2. Urinary tract infection associated with indwelling urethral catheter, initial encounter  T83.511A 996.64    N39.0 599.0         PNA (pneumonia)        Past Medical History:   Diagnosis Date    Age-related cognitive decline     Allergic contact dermatitis     Allergies     Anemia     Bedbound     2023 \"MY LEG MUSCLES STOPPED WORKING\"    Bronchiectasis with acute lower respiratory infection     Charcot foot due to diabetes mellitus 09/10/2013    Chronic diastolic (congestive) heart failure     Chronic kidney disease     Chronic respiratory failure with hypoxia     Closed supracondylar fracture of femur 2022    COPD (chronic obstructive pulmonary disease)     Deep vein thrombosis (DVT) of lower extremity associated with air travel 2023    Dependence on supplemental oxygen     Eczema     Erectile dysfunction     due to organic reasons    Essential (primary) hypertension     Fracture     closed fracture of other tarsal and metatarsal bones    Fracture of proximal humerus 2023    GERD without esophagitis     High risk medication use     Hypercholesteremia     Hypomagnesemia     Infected stasis ulcer of left lower extremity 2023    Insomnia     Low back pain     Major depressive disorder     Morbid (severe) obesity due to excess calories     MRSA pneumonia     Muscle weakness     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 without evidence of necrosis     Obstructive sleep apnea (adult) (pediatric)     On home O2     REPORTS WEARING 2L/NC AAT    Other forms of dyspnea     Other long term (current) drug therapy     Other specified noninfective " gastroenteritis and colitis     Other spondylosis, lumbar region     Pain in both knees     Paroxysmal atrial fibrillation     Peripheral neuropathy     attributed to type 2 diabetes    Pneumonia, unspecified organism     Polyneuropathy     Rash and other nonspecific skin eruption     Syncope and collapse     Tachycardia     Tinnitus 01/13/2023    Type 1 diabetes mellitus with diabetic chronic kidney disease     Type 2 diabetes mellitus     Unspecified fall, initial encounter     Urinary retention         Past Surgical History:   Procedure Laterality Date    CARDIAC CATHETERIZATION Left 8/15/2024    Procedure: Carbon dioxide aortogram with left leg angiogram, possible angioplasty or stenting;  Surgeon: Moshe Willson MD;  Location: [facility] INVASIVE LOCATION;  Service: Vascular;  Laterality: Left;    CHOLECYSTECTOMY      CYSTOSCOPY      FEMUR SURGERY Left     Shravan placed    KNEE SURGERY Left     OTHER SURGICAL HISTORY Left     venous port, REMOVED    PORTACATH PLACEMENT Right     TIBIAL PLATEAU OPEN REDUCTION INTERNAL FIXATION Left 12/22/2023    Procedure: TIBIAL PLATEAU OPEN REDUCTION INTERNAL FIXATION;  Surgeon: Hugo Kline MD;  Location: [facility] OR;  Service: Orthopedics;  Laterality: Left;    TONSILLECTOMY AND ADENOIDECTOMY           Physical Assessment:  Wound 12/22/23 1322 Left posterior heel Pressure Injury (Active)   Wound Image   01/24/25 1035   Pressure Injury Stage U 01/24/25 1035   Dressing Appearance intact;dried drainage 01/24/25 1035   Closure None 01/24/25 1035   Base black;necrotic;eschar;red 01/24/25 1035   Black (%), Wound Tissue Color 75 01/24/25 1035   Red (%), Wound Tissue Color 25 01/24/25 1035   Periwound dry;redness 01/24/25 1035   Periwound Temperature warm 01/24/25 1035   Periwound Skin Turgor soft 01/24/25 1035   Edges open 01/24/25 1035   Wound Length (cm) 4 cm 01/24/25 1035   Wound Width (cm) 5.5 cm 01/24/25 1035   Wound Depth (cm) 0.2 cm 01/24/25 1035   Wound  Surface Area (cm^2) 22 cm^2 01/24/25 1035   Wound Volume (cm^3) 4.4 cm^3 01/24/25 1035   Drainage Characteristics/Odor serosanguineous 01/24/25 1035   Drainage Amount scant 01/24/25 1035   Care, Wound cleansed with;sterile normal saline 01/24/25 1035   Dressing Care dressing applied;dressing moistened;gauze, dry 01/24/25 1035   Periwound Care absorptive dressing applied 01/24/25 1035       Wound 01/23/25 2330 coccyx pressure injury (Active)   Wound Image   01/24/25 1035   Pressure Injury Stage U 01/24/25 1035   Dressing Appearance open to air 01/24/25 1035   Closure None 01/24/25 1035   Base slough;yellow 01/24/25 1035   Periwound intact;redness;moist 01/24/25 1035   Periwound Temperature warm 01/24/25 1035   Periwound Skin Turgor soft 01/24/25 1035   Edges open 01/24/25 1035   Wound Length (cm) 6 cm 01/24/25 1035   Wound Width (cm) 3 cm 01/24/25 1035   Wound Depth (cm) 0.1 cm 01/24/25 1035   Wound Surface Area (cm^2) 18 cm^2 01/24/25 1035   Wound Volume (cm^3) 1.8 cm^3 01/24/25 1035   Drainage Amount none 01/24/25 1035   Care, Wound cleansed with;sterile normal saline 01/24/25 1035   Dressing Care dressing applied;hydrofiber;silver impregnated;silicone border foam 01/24/25 1035        Wound Check / Follow-up:   Patient seen today for wound consult.  Patient was lying in bed resting quietly with eyes closed but arouses to name.  Explained purpose for visit and patient is agreeable to assessment.      Patient states that he has been found and has been lying in the bed for quite some time.  He was also recently hospitalized at another facility for documented pneumonia.  Patient states that he had been constipated for a while and then began receiving enemas to assist with relief.    Patient has a chronic wound to the left heel with dry stable eschar with surrounding areas of healthy red moist tissue.  There is additionally a small area of dark brown discoloration consistent with deep tissue pressure injury along the  lateral aspect distal to the wound.  Scant amount of serosanguineous drainage was noted with cleansing.  Healed wound with scarring along the lateral aspect of the foot at the base of the left fifth toe.  Generalized dry skin.  Wound along the heel was cleansed with normal saline and gauze.  Recommending daily applications of Betadine moistened gauze that is then covered with dry gauze and secured with gauze roll.    Patient with large area of perirectal tissue necrosis.  The extent of the necrosis is unknown as it is uncertain if it extends into the rectum.  The visible tissue is soft and yellow slough.  Depth is unknown but is likely to reveal a larger cavity if it opens.  Patient states that he was not aware of this wound until recently and believes that it only began a short time ago.  He is uncertain of what may have caused it but feels that maybe the constipation may have attributed to it.  Wound was cleansed with normal saline and gauze and then the slough was painted with Betadine.  In the interim a silver impregnated Hydrofiber and silicone border dressing was applied but will recommend twice daily dressing changes and as needed that consist of Santyl to the wound base and then a normal saline moistened gauze and dressing over the surface.  Patient will need to notify staff anytime he requires to have a bowel movement as the dressing will obscure the rectum.  Hospitalist was made aware of the perirectal tissue necrosis after assessment today.    Impression: Chronic wound to the left heel consistent with pressure.  Deep tissue injury along the left lateral distal heel or foot.  Soft tissue necrosis along the perirectal tissue.    Short term goals: Regain skin integrity.  Daily and twice daily dressing changes.  Skin protection and moisture prevention.  Pressure reduction.    Corazon Daigle RN    1/24/2025    13:00 EST

## 2025-01-24 NOTE — PLAN OF CARE
Problem: Noninvasive Ventilation Acute  Goal: Effective Unassisted Ventilation and Oxygenation  Outcome: Progressing     Problem: Adult Inpatient Plan of Care  Goal: Plan of Care Review  Outcome: Progressing  Flowsheets (Taken 1/24/2025 0510)  Progress: no change  Outcome Evaluation: Patient VSS on 3L NC. Current plan of care reviewed with patient. Plan of care maintained.  Plan of Care Reviewed With: patient  Goal: Patient-Specific Goal (Individualized)  Outcome: Progressing  Goal: Absence of Hospital-Acquired Illness or Injury  Outcome: Progressing  Intervention: Identify and Manage Fall Risk  Recent Flowsheet Documentation  Taken 1/24/2025 0500 by Florecita Fagan RN  Safety Promotion/Fall Prevention:   activity supervised   clutter free environment maintained   assistive device/personal items within reach   fall prevention program maintained   lighting adjusted   nonskid shoes/slippers when out of bed   room organization consistent   safety round/check completed  Taken 1/24/2025 0400 by Florecita Fagan RN  Safety Promotion/Fall Prevention:   activity supervised   clutter free environment maintained   assistive device/personal items within reach   fall prevention program maintained   lighting adjusted   nonskid shoes/slippers when out of bed   room organization consistent   safety round/check completed  Taken 1/24/2025 0300 by Florecita Fagan, RN  Safety Promotion/Fall Prevention:   activity supervised   clutter free environment maintained   assistive device/personal items within reach   fall prevention program maintained   lighting adjusted   nonskid shoes/slippers when out of bed   room organization consistent   safety round/check completed  Taken 1/24/2025 0200 by Florecita Fagan, RN  Safety Promotion/Fall Prevention:   activity supervised   clutter free environment maintained   assistive device/personal items within reach   fall prevention program maintained   lighting adjusted   nonskid  shoes/slippers when out of bed   room organization consistent   safety round/check completed  Taken 1/24/2025 0100 by Florecita Fagan RN  Safety Promotion/Fall Prevention:   activity supervised   clutter free environment maintained   assistive device/personal items within reach   fall prevention program maintained   lighting adjusted   nonskid shoes/slippers when out of bed   room organization consistent   safety round/check completed  Taken 1/23/2025 2330 by Florecita Fagan RN  Safety Promotion/Fall Prevention:   activity supervised   clutter free environment maintained   assistive device/personal items within [facility]   fall prevention program maintained   lighting adjusted   nonskid shoes/slippers when out of bed   room organization consistent   safety round/check completed  Intervention: Prevent Skin Injury  Recent Flowsheet Documentation  Taken 1/24/2025 0400 by Florecita Fagan RN  Body Position: position changed independently  Taken 1/23/2025 2330 by Florecita Fagan RN  Body Position: position changed independently  Skin Protection:   incontinence pads utilized   silicone foam dressing in place  Intervention: Prevent and Manage VTE (Venous Thromboembolism) Risk  Recent Flowsheet Documentation  Taken 1/23/2025 2330 by Florecita Fagan RN  VTE Prevention/Management:   SCDs (sequential compression devices) off   patient refused intervention  Intervention: Prevent Infection  Recent Flowsheet Documentation  Taken 1/24/2025 0500 by Florecita Fagan RN  Infection Prevention:   hand hygiene promoted   rest/sleep promoted  Taken 1/24/2025 0400 by Florecita Fagan RN  Infection Prevention:   hand hygiene promoted   rest/sleep promoted  Taken 1/24/2025 0300 by Florecita Fagan RN  Infection Prevention:   hand hygiene promoted   rest/sleep promoted  Taken 1/24/2025 0200 by Florecita Fagan RN  Infection Prevention:   hand hygiene promoted   rest/sleep promoted  Taken 1/24/2025 0100 by Rylan  LEN Bernabe  Infection Prevention:   hand hygiene promoted   rest/sleep promoted  Taken 1/23/2025 2330 by Florecita Fagan RN  Infection Prevention:   hand hygiene promoted   rest/sleep promoted  Goal: Optimal Comfort and Wellbeing  Outcome: Progressing  Intervention: Provide Person-Centered Care  Recent Flowsheet Documentation  Taken 1/23/2025 2330 by Florecita Fagan RN  Trust Relationship/Rapport:   care explained   choices provided   emotional support provided   empathic listening provided   questions answered   questions encouraged   reassurance provided   thoughts/feelings acknowledged  Goal: Readiness for Transition of Care  Outcome: Progressing  Intervention: Mutually Develop Transition Plan  Recent Flowsheet Documentation  Taken 1/24/2025 0116 by Florecita Fagan RN  Transportation Anticipated: family or friend will provide  Patient/Family Anticipated Services at Transition: home health care  Patient/Family Anticipates Transition to:   home with family   home with help/services  Taken 1/24/2025 0115 by Florecita Fagan RN  Equipment Currently Used at Home: oxygen     Problem: Skin Injury Risk Increased  Goal: Skin Health and Integrity  Outcome: Progressing  Intervention: Optimize Skin Protection  Recent Flowsheet Documentation  Taken 1/24/2025 0400 by Florecita Fagan RN  Head of Bed (HOB) Positioning: HOB at 30-45 degrees  Taken 1/23/2025 2330 by Florecita Fagan RN  Pressure Reduction Techniques:   frequent weight shift encouraged   weight shift assistance provided  Head of Bed (HOB) Positioning: HOB at 30-45 degrees  Pressure Reduction Devices: pressure-redistributing mattress utilized  Skin Protection:   incontinence pads utilized   silicone foam dressing in place     Problem: Sepsis/Septic Shock  Goal: Optimal Coping  Outcome: Progressing  Goal: Absence of Bleeding  Outcome: Progressing  Goal: Blood Glucose Level Within Target Range  Outcome: Progressing  Goal: Absence of Infection  Signs and Symptoms  Outcome: Progressing  Intervention: Initiate Sepsis Management  Recent Flowsheet Documentation  Taken 1/24/2025 0500 by Florecita Fagan RN  Infection Prevention:   hand hygiene promoted   rest/sleep promoted  Taken 1/24/2025 0400 by Florecita Fagan RN  Infection Prevention:   hand hygiene promoted   rest/sleep promoted  Taken 1/24/2025 0300 by Florecita Fagan RN  Infection Prevention:   hand hygiene promoted   rest/sleep promoted  Taken 1/24/2025 0200 by Florecita Fagan RN  Infection Prevention:   hand hygiene promoted   rest/sleep promoted  Taken 1/24/2025 0100 by Florecita Fagan RN  Infection Prevention:   hand hygiene promoted   rest/sleep promoted  Taken 1/23/2025 2330 by Florecita Fagan RN  Infection Prevention:   hand hygiene promoted   rest/sleep promoted  Goal: Optimal Nutrition Delivery  Outcome: Progressing     Problem: Comorbidity Management  Goal: Maintenance of COPD Symptom Control  Outcome: Progressing  Goal: Blood Glucose Level Within Target Range  Outcome: Progressing   Goal Outcome Evaluation:  Plan of Care Reviewed With: patient        Progress: no change  Outcome Evaluation: Patient VSS on 3L NC. Current plan of care reviewed with patient. Plan of care maintained.

## 2025-01-24 NOTE — TELEPHONE ENCOUNTER
Caller: JOHNATHAN- PHARMACIST- [facility]    Relationship:     Best call back number: [phone]     What orders are you requesting (i.e. lab or imaging): VERBAL ORDERS     In what timeframe would the patient need to come in: ASAP     Additional notes: CALLING REQUESTING A VERBAL ORDER TO RESTART PORT CARE

## 2025-01-25 ENCOUNTER — APPOINTMENT (OUTPATIENT)
Dept: CT IMAGING | Facility: HOSPITAL | Age: 60
End: 2025-01-25
Payer: COMMERCIAL

## 2025-01-25 LAB
ANION GAP SERPL CALCULATED.3IONS-SCNC: 10.2 MMOL/L (ref 5–15)
BASOPHILS # BLD AUTO: 0.01 10*3/MM3 (ref 0–0.2)
BASOPHILS NFR BLD AUTO: 0.1 % (ref 0–1.5)
BUN SERPL-MCNC: 63 MG/DL (ref 6–20)
BUN/CREAT SERPL: 29.9 (ref 7–25)
CALCIUM SPEC-SCNC: 7.9 MG/DL (ref 8.6–10.5)
CHLORIDE SERPL-SCNC: 101 MMOL/L (ref 98–107)
CHOLEST SERPL-MCNC: 116 MG/DL (ref 0–200)
CO2 SERPL-SCNC: 28.8 MMOL/L (ref 22–29)
CREAT SERPL-MCNC: 2.11 MG/DL (ref 0.76–1.27)
DEPRECATED RDW RBC AUTO: 45.5 FL (ref 37–54)
EGFRCR SERPLBLD CKD-EPI 2021: 35.4 ML/MIN/1.73
EOSINOPHIL # BLD AUTO: 0 10*3/MM3 (ref 0–0.4)
EOSINOPHIL NFR BLD AUTO: 0 % (ref 0.3–6.2)
ERYTHROCYTE [DISTWIDTH] IN BLOOD BY AUTOMATED COUNT: 14.2 % (ref 12.3–15.4)
GLUCOSE BLDC GLUCOMTR-MCNC: 242 MG/DL (ref 70–99)
GLUCOSE BLDC GLUCOMTR-MCNC: 391 MG/DL (ref 70–99)
GLUCOSE BLDC GLUCOMTR-MCNC: 410 MG/DL (ref 70–99)
GLUCOSE BLDC GLUCOMTR-MCNC: 413 MG/DL (ref 70–99)
GLUCOSE SERPL-MCNC: 363 MG/DL (ref 65–99)
HCT VFR BLD AUTO: 26.2 % (ref 37.5–51)
HDLC SERPL-MCNC: 54 MG/DL (ref 40–60)
HGB BLD-MCNC: 7.9 G/DL (ref 13–17.7)
IMM GRANULOCYTES # BLD AUTO: 0.16 10*3/MM3 (ref 0–0.05)
IMM GRANULOCYTES NFR BLD AUTO: 1 % (ref 0–0.5)
LDLC SERPL CALC-MCNC: 50 MG/DL (ref 0–100)
LDLC/HDLC SERPL: 0.97 {RATIO}
LYMPHOCYTES # BLD AUTO: 0.74 10*3/MM3 (ref 0.7–3.1)
LYMPHOCYTES NFR BLD AUTO: 4.5 % (ref 19.6–45.3)
MCH RBC QN AUTO: 27.2 PG (ref 26.6–33)
MCHC RBC AUTO-ENTMCNC: 30.2 G/DL (ref 31.5–35.7)
MCV RBC AUTO: 90.3 FL (ref 79–97)
MONOCYTES # BLD AUTO: 0.81 10*3/MM3 (ref 0.1–0.9)
MONOCYTES NFR BLD AUTO: 5 % (ref 5–12)
NEUTROPHILS NFR BLD AUTO: 14.57 10*3/MM3 (ref 1.7–7)
NEUTROPHILS NFR BLD AUTO: 89.4 % (ref 42.7–76)
NRBC BLD AUTO-RTO: 0 /100 WBC (ref 0–0.2)
PLATELET # BLD AUTO: 423 10*3/MM3 (ref 140–450)
PMV BLD AUTO: 9.1 FL (ref 6–12)
POTASSIUM SERPL-SCNC: 4.2 MMOL/L (ref 3.5–5.2)
QT INTERVAL: 327 MS
QTC INTERVAL: 410 MS
RBC # BLD AUTO: 2.9 10*6/MM3 (ref 4.14–5.8)
SODIUM SERPL-SCNC: 140 MMOL/L (ref 136–145)
TRIGL SERPL-MCNC: 49 MG/DL (ref 0–150)
VLDLC SERPL-MCNC: 12 MG/DL (ref 5–40)
WBC NRBC COR # BLD AUTO: 16.29 10*3/MM3 (ref 3.4–10.8)

## 2025-01-25 PROCEDURE — 82948 REAGENT STRIP/BLOOD GLUCOSE: CPT

## 2025-01-25 PROCEDURE — 94669 MECHANICAL CHEST WALL OSCILL: CPT

## 2025-01-25 PROCEDURE — 94799 UNLISTED PULMONARY SVC/PX: CPT

## 2025-01-25 PROCEDURE — 99233 SBSQ HOSP IP/OBS HIGH 50: CPT | Performed by: STUDENT IN AN ORGANIZED HEALTH CARE EDUCATION/TRAINING PROGRAM

## 2025-01-25 PROCEDURE — 63710000001 INSULIN LISPRO (HUMAN) PER 5 UNITS: Performed by: STUDENT IN AN ORGANIZED HEALTH CARE EDUCATION/TRAINING PROGRAM

## 2025-01-25 PROCEDURE — 99232 SBSQ HOSP IP/OBS MODERATE 35: CPT | Performed by: STUDENT IN AN ORGANIZED HEALTH CARE EDUCATION/TRAINING PROGRAM

## 2025-01-25 PROCEDURE — 25010000002 MEROPENEM PER 100 MG: Performed by: STUDENT IN AN ORGANIZED HEALTH CARE EDUCATION/TRAINING PROGRAM

## 2025-01-25 PROCEDURE — 85025 COMPLETE CBC W/AUTO DIFF WBC: CPT | Performed by: STUDENT IN AN ORGANIZED HEALTH CARE EDUCATION/TRAINING PROGRAM

## 2025-01-25 PROCEDURE — 25010000002 METHYLPREDNISOLONE PER 40 MG: Performed by: STUDENT IN AN ORGANIZED HEALTH CARE EDUCATION/TRAINING PROGRAM

## 2025-01-25 PROCEDURE — 80061 LIPID PANEL: CPT | Performed by: SPECIALIST

## 2025-01-25 PROCEDURE — 82948 REAGENT STRIP/BLOOD GLUCOSE: CPT | Performed by: STUDENT IN AN ORGANIZED HEALTH CARE EDUCATION/TRAINING PROGRAM

## 2025-01-25 PROCEDURE — 63710000001 INSULIN GLARGINE PER 5 UNITS: Performed by: STUDENT IN AN ORGANIZED HEALTH CARE EDUCATION/TRAINING PROGRAM

## 2025-01-25 PROCEDURE — 74176 CT ABD & PELVIS W/O CONTRAST: CPT

## 2025-01-25 PROCEDURE — 63710000001 REVEFENACIN 175 MCG/3ML SOLUTION: Performed by: STUDENT IN AN ORGANIZED HEALTH CARE EDUCATION/TRAINING PROGRAM

## 2025-01-25 PROCEDURE — 80048 BASIC METABOLIC PNL TOTAL CA: CPT | Performed by: STUDENT IN AN ORGANIZED HEALTH CARE EDUCATION/TRAINING PROGRAM

## 2025-01-25 RX ORDER — INSULIN LISPRO 100 [IU]/ML
4 INJECTION, SOLUTION INTRAVENOUS; SUBCUTANEOUS
Status: DISCONTINUED | OUTPATIENT
Start: 2025-01-25 | End: 2025-01-27

## 2025-01-25 RX ADMIN — BUSPIRONE HYDROCHLORIDE 15 MG: 5 TABLET ORAL at 08:32

## 2025-01-25 RX ADMIN — DULOXETINE HYDROCHLORIDE 60 MG: 30 CAPSULE, DELAYED RELEASE ORAL at 08:32

## 2025-01-25 RX ADMIN — MEROPENEM 1000 MG: 1 INJECTION INTRAVENOUS at 11:45

## 2025-01-25 RX ADMIN — TRAZODONE HYDROCHLORIDE 150 MG: 100 TABLET ORAL at 21:40

## 2025-01-25 RX ADMIN — CARVEDILOL 25 MG: 25 TABLET, FILM COATED ORAL at 08:33

## 2025-01-25 RX ADMIN — BUMETANIDE 2 MG: 1 TABLET ORAL at 08:33

## 2025-01-25 RX ADMIN — FAMOTIDINE 40 MG: 20 TABLET ORAL at 06:00

## 2025-01-25 RX ADMIN — Medication 10 ML: at 08:34

## 2025-01-25 RX ADMIN — APIXABAN 5 MG: 5 TABLET, FILM COATED ORAL at 21:41

## 2025-01-25 RX ADMIN — ATORVASTATIN CALCIUM 20 MG: 10 TABLET, FILM COATED ORAL at 08:32

## 2025-01-25 RX ADMIN — INSULIN LISPRO 7 UNITS: 100 INJECTION, SOLUTION INTRAVENOUS; SUBCUTANEOUS at 21:41

## 2025-01-25 RX ADMIN — TOBRAMYCIN 300 MG: 300 SOLUTION RESPIRATORY (INHALATION) at 18:35

## 2025-01-25 RX ADMIN — GUAIFENESIN 600 MG: 600 TABLET ORAL at 21:42

## 2025-01-25 RX ADMIN — INSULIN LISPRO 4 UNITS: 100 INJECTION, SOLUTION INTRAVENOUS; SUBCUTANEOUS at 12:06

## 2025-01-25 RX ADMIN — NIFEDIPINE 30 MG: 30 TABLET, FILM COATED, EXTENDED RELEASE ORAL at 06:00

## 2025-01-25 RX ADMIN — INSULIN LISPRO 4 UNITS: 100 INJECTION, SOLUTION INTRAVENOUS; SUBCUTANEOUS at 18:12

## 2025-01-25 RX ADMIN — MEROPENEM 1000 MG: 1 INJECTION INTRAVENOUS at 00:02

## 2025-01-25 RX ADMIN — TOBRAMYCIN 300 MG: 300 SOLUTION RESPIRATORY (INHALATION) at 07:27

## 2025-01-25 RX ADMIN — REVEFENACIN 175 MCG: 175 SOLUTION RESPIRATORY (INHALATION) at 07:27

## 2025-01-25 RX ADMIN — TAMSULOSIN HYDROCHLORIDE 0.4 MG: 0.4 CAPSULE ORAL at 08:33

## 2025-01-25 RX ADMIN — MONTELUKAST 10 MG: 10 TABLET, FILM COATED ORAL at 21:41

## 2025-01-25 RX ADMIN — INSULIN GLARGINE 20 UNITS: 100 INJECTION, SOLUTION SUBCUTANEOUS at 21:41

## 2025-01-25 RX ADMIN — COLLAGENASE SANTYL 1 APPLICATION: 250 OINTMENT TOPICAL at 10:58

## 2025-01-25 RX ADMIN — FINASTERIDE 5 MG: 5 TABLET, FILM COATED ORAL at 08:33

## 2025-01-25 RX ADMIN — Medication 10 ML: at 21:42

## 2025-01-25 RX ADMIN — INSULIN LISPRO 6 UNITS: 100 INJECTION, SOLUTION INTRAVENOUS; SUBCUTANEOUS at 18:11

## 2025-01-25 RX ADMIN — ARFORMOTEROL TARTRATE 15 MCG: 15 SOLUTION RESPIRATORY (INHALATION) at 07:27

## 2025-01-25 RX ADMIN — INSULIN LISPRO 3 UNITS: 100 INJECTION, SOLUTION INTRAVENOUS; SUBCUTANEOUS at 08:01

## 2025-01-25 RX ADMIN — BUDESONIDE 0.5 MG: 0.5 INHALANT RESPIRATORY (INHALATION) at 18:36

## 2025-01-25 RX ADMIN — WATER 40 MG: 1 INJECTION INTRAMUSCULAR; INTRAVENOUS; SUBCUTANEOUS at 02:06

## 2025-01-25 RX ADMIN — BUSPIRONE HYDROCHLORIDE 15 MG: 5 TABLET ORAL at 21:41

## 2025-01-25 RX ADMIN — CARVEDILOL 25 MG: 25 TABLET, FILM COATED ORAL at 21:55

## 2025-01-25 RX ADMIN — BUMETANIDE 2 MG: 1 TABLET ORAL at 21:55

## 2025-01-25 RX ADMIN — ARFORMOTEROL TARTRATE 15 MCG: 15 SOLUTION RESPIRATORY (INHALATION) at 18:35

## 2025-01-25 RX ADMIN — FERROUS SULFATE TAB 325 MG (65 MG ELEMENTAL FE) 325 MG: 325 (65 FE) TAB at 08:01

## 2025-01-25 RX ADMIN — INSULIN LISPRO 7 UNITS: 100 INJECTION, SOLUTION INTRAVENOUS; SUBCUTANEOUS at 11:45

## 2025-01-25 RX ADMIN — WATER 40 MG: 1 INJECTION INTRAMUSCULAR; INTRAVENOUS; SUBCUTANEOUS at 10:57

## 2025-01-25 RX ADMIN — APIXABAN 5 MG: 5 TABLET, FILM COATED ORAL at 08:33

## 2025-01-25 RX ADMIN — WATER 40 MG: 1 INJECTION INTRAMUSCULAR; INTRAVENOUS; SUBCUTANEOUS at 18:12

## 2025-01-25 RX ADMIN — BUDESONIDE 0.5 MG: 0.5 INHALANT RESPIRATORY (INHALATION) at 07:27

## 2025-01-25 RX ADMIN — GUAIFENESIN 600 MG: 600 TABLET ORAL at 08:33

## 2025-01-25 RX ADMIN — ASPIRIN 81 MG: 81 TABLET, COATED ORAL at 06:00

## 2025-01-25 NOTE — PROGRESS NOTES
[facility]   Progress Note    Patient Name: Preston Wallis  : 1965  MRN: [id number]  Primary Care Physician: Kimmy Riley MD  Date of admission: 2025    Subjective   Subjective     HPI:  Patient without chest pain or shortness of breath at rest and treated for pneumonia, patient will be evaluated with a nuclear study on Monday for evaluation of coronary artery disease    Review of Systems  Review of Systems   Constitutional: Negative.    HENT: Negative.     Eyes: Negative.    Respiratory: Negative.     Cardiovascular:  Negative for chest pain.   Gastrointestinal: Negative.    Endocrine: Negative.    Genitourinary: Negative.    Musculoskeletal:         Lower extremity skin wounds related to probably diabetes   Skin:  Positive for pallor.   Neurological: Negative.    Psychiatric/Behavioral: Negative.         Objective   Objective     Vitals:  Temp:  [97.5 °F (36.4 °C)-99.3 °F (37.4 °C)] 99.3 °F (37.4 °C)  Heart Rate:  [89-96] 89  Resp:  [16-22] 20  BP: (148-179)/(63-78) 157/63  Flow (L/min) (Oxygen Therapy):  [2] 2    Physical Exam:  Physical Exam    Result Review    Result Review:  I have personally reviewed the results from the time of this admission to 25 3:35 PM EST and agree with these findings:  [x]  Laboratory  []  Microbiology  []  Radiology  []  EKG/Telemetry   []  Cardiology/Vascular   []  Pathology  []  Old records  []  Other:    Most notable findings include: 59-year-old gentleman with history of COPD, pretension, possible congestive failure, patient will be evaluated with a nuclear study on Monday for coronary artery disease    Assessment & Plan   Assessment / Plan     Active Hospital Problems:  Active Hospital Problems    Diagnosis    • **PNA (pneumonia)        Plan:   Continue current therapy for now tomorrow will stop the caffeine after breakfast    DVT prophylaxis: As per internal medicine    CODE STATUS:    Code Status and Medical Interventions: CPR (Attempt to  Resuscitate); Full Support   Ordered at: 01/23/25 1959     Code Status (Patient has no pulse and is not breathing):    CPR (Attempt to Resuscitate)     Medical Interventions (Patient has pulse or is breathing):    Full Support       Disposition:  I expect patient to be discharged as per internal medicine.    Electronically signed by Alec Padron MD, 01/25/25, 3:35 PM EST.

## 2025-01-25 NOTE — PROGRESS NOTES
Pulmonary / Critical Care Progress Note      Patient Name: Preston Wallis  : 1965  MRN: [id number]  Attending:  Matty Alejandra MD  Date of admission: 2025    Subjective   Subjective   Follow-up for acute on chronic hypoxic respiratory failure    Over past 24 hours:  On 2 L nasal cannula  Lungs sound diminished but moving air  Reports feeling little bit better this morning after starting MOIZ and meropenem  Still has cough that is mostly nonproductive    Review of Systems  General: Denied complaints  Cardiovascular:  Denied complaints  Respiratory: Denied complaints  Gastrointestinal: Denied complaints        Objective   Objective     Vitals:   Temp:  [97.5 °F (36.4 °C)-99.3 °F (37.4 °C)] 99.3 °F (37.4 °C)  Heart Rate:  [89-96] 89  Resp:  [16-22] 20  BP: (148-179)/(63-78) 157/63  Flow (L/min) (Oxygen Therapy):  [2] 2    Physical Exam   Vital Signs Reviewed   General:  WDWN, Alert, NAD.    HEENT:  PERRL, EOMI.  OP, nares clear  Chest: Diminished on auscultation bilaterally, no work of breathing noted on 2 L nasal cannula  CV: RRR, no MGR, pulses 2+, equal.  Abd:  Soft, NT, ND, + BS, obese  EXT:  no clubbing, no cyanosis, no edema  Neuro:  A&Ox3, CN grossly intact, no focal deficits.  Skin: No rashes or lesions noted      Result Review    Result Review:  I have personally reviewed the results from the time of this admission to 2025 13:55 EST and agree with these findings:  []  Laboratory  []  Microbiology  []  Radiology  []  EKG/Telemetry   []  Cardiology/Vascular   []  Pathology  []  Old records  []  Other:  Most notable findings include:   -     Assessment & Plan   Assessment / Plan     Active Hospital Problems:  Active Hospital Problems    Diagnosis    • **PNA (pneumonia)          Impression:  Cystic bronchiectasis with acute exacerbation  History of MDR Pseudomonas infection on chronic MOIZ neb  Difficulty with airway clearance  Obesity with BMI of 36  Acute on chronic hypoxic and hypercapnic  respiratory failure     Plan:  Continue with Brovana, Pulmicort nebulizers twice daily.    Continue with Yupelri nebulizer once daily.  Continue chest vest 4 times daily.  Echocardiogram per primary service  Continue with meropenem IV for total 7 days   Continue with MOIZ nebs  Continue Singulair once daily.  Check a sputum culture if able to obtain.  Continue supplemental oxygen via nasal cannula.  If no significant improvement, will consider bronchoscopy     I have reviewed labs, imaging, pertinent clinical data and provider notes.   I have discussed with bedside nurse and primary service.      VTE Prophylaxis:  Pharmacologic VTE prophylaxis orders are present.        CODE STATUS:   Code Status (Patient has no pulse and is not breathing): CPR (Attempt to Resuscitate)  Medical Interventions (Patient has pulse or is breathing): Full Support      Labs, images, and medications personally reviewed.    Discussed with patient    Electronically signed by Deisi Nichole MD, 01/25/25, 1:55 PM EST.

## 2025-01-25 NOTE — PLAN OF CARE
Goal Outcome Evaluation:           Progress: improving   Pt alert and oriented x4. Continues on oxygen/nasal cannula. Doyle cath for retention. Vss. No complaints of pain.

## 2025-01-25 NOTE — PLAN OF CARE
Goal Outcome Evaluation:    AAO X4. TOLERATING DIET, 2L/NC (WEARS 2L BASELINE), & CONTINUE TO ENCOURAGE INCREASED ACTIVITY. WOUND CARE PER MD ORDERS. CM: NSR

## 2025-01-25 NOTE — PLAN OF CARE
Goal Outcome Evaluation:               Pt remains alert and oriented, continues on 2L per nasal canula, IV antibiotics, dressing changes, and turning every two hours to prevent further skin breakdown. Pt continues to use flutter and ISB.  Pt uses 2 liters of O2 at home.  Blood sugar checks Q4 hours on steroids elevated.  Port to right chest with dressing change due tomorrow 1/26

## 2025-01-25 NOTE — PROGRESS NOTES
[facility]   Hospitalist Progress Note  Date: 2025  Patient Name: Preston Wallis  : 1965  MRN: [id number]  Date of admission: 2025  Room/Bed: 314/1      Subjective   Subjective     Chief Complaint:   Chief Complaint   Patient presents with   • Shortness of Breath       Summary:   59 y.o. male past medical history of COPD, hypertension, CHF, history of MDRO presents to the ED due to shortness of breath.  Patient was discharged on  for pneumonia.  Patient was discharged with IV ertapenem for his MDRO.  Patient states since being discharged his symptoms did not improve and continued to worsen.  At that time patient was discharged with home oxygen of 2 L and currently on 5 L.  Denies fever, chills, chest pain, abdominal pain, nausea, vomiting, diarrhea, headache or dysuria.  In the ED, patient's vitals showed temperature 97.6, heart rate 90 and blood pressure 155/73.  Patient's labs show troponin 94 and repeat 92, BNP 1100, sodium 135, creatinine 2.1, glucose 252, AST 49 ALT 42, white blood cell 24.2 and hemoglobin 9.0.  UA shows white blood cells with leukocytes and nitrites.  Chest x-ray shows chronic bilateral lung infiltrates.  Patient admitted to floors for further management.     Interval Followup:   Patient remains on 2 L nasal cannula, states that his symptoms are necessarily worse but just have not improved.  CT was performed to arrival of the chest which demonstrates new spiculated left lower lobe nodule, bronchiectasis throughout both lungs, micronodule or peribronchial densities suggestive of atypical infection, right paratracheal lymph node and enlarging pericardial effusion.  Pulmonology and cardiology services consulted.  TTE demonstrates very small pericardial effusion only, estimated EF 49%, with some grade 1 diastolic dysfunction left ventricle    25  Patient feels well.  He has no complaints today.  He is tolerating antibiotics.  Respiratory status stable.  Wound  care noted perirectal tissue necrosis yesterday as well as some chronic wounds lower extremities.  Pending CT abdomen pelvis.      Objective   Objective     Vitals:   Temp:  [97.5 °F (36.4 °C)-98.5 °F (36.9 °C)] 98.2 °F (36.8 °C)  Heart Rate:  [89-96] 93  Resp:  [16-22] 20  BP: (148-199)/(69-93) 179/74  Flow (L/min) (Oxygen Therapy):  [2] 2    Physical Exam   General: Awake, alert, in no acute distress  HENT: Atraumatic, normocephalic. Nasal cannula in place 2 L minute oxygen flow rate  Eyes: pupils equal, round, without scleral icterus  Cardiovascular: Regular rate and rhythm  Pulmonary: No respiratory distress; no conversational dyspnea.  Gastrointestinal: Soft nontender nondistended  Musculoskeletal: No gross deformities      Result Review    Result Review:  I have personally reviewed these results:  [x]  Laboratory      Lab 01/25/25  0349 01/24/25  0534 01/23/25  1735 01/23/25  1614   WBC 16.29* 16.89*  --  24.28*   HEMOGLOBIN 7.9* 8.6*  --  9.0*   HEMATOCRIT 26.2* 29.3*  --  30.2*   PLATELETS 423 405  --  419   NEUTROS ABS 14.57* 15.43*  --  21.34*   IMMATURE GRANS (ABS) 0.16* 0.18*  --  0.22*   LYMPHS ABS 0.74 0.93  --  1.17   MONOS ABS 0.81 0.33  --  1.28*   EOS ABS 0.00 0.00  --  0.22   MCV 90.3 92.4  --  91.8   SED RATE  --  102*  --   --    CRP  --  7.03*  --   --    LACTATE  --   --  0.9  --          Lab 01/25/25  0349 01/24/25  0534 01/23/25  1614   SODIUM 140 136 135*   POTASSIUM 4.2 4.8 4.9   CHLORIDE 101 99 94*   CO2 28.8 25.8 30.4*   ANION GAP 10.2 11.2 10.6   BUN 63* 51* 50*   CREATININE 2.11* 2.14* 2.16*   EGFR 35.4* 34.8* 34.4*   GLUCOSE 363* 301* 252*   CALCIUM 7.9* 8.0* 8.6         Lab 01/23/25  1614   TOTAL PROTEIN 7.9   ALBUMIN 3.1*   GLOBULIN 4.8   ALT (SGPT) 42*   AST (SGOT) 49*   BILIRUBIN 0.2   ALK PHOS 200*         Lab 01/24/25  0534 01/23/25  1735 01/23/25  1614   PROBNP  --   --  1,193.0*   HSTROP T 77* 92* 94*         Lab 01/25/25  0349   CHOLESTEROL 116   LDL CHOL 50   HDL CHOL 54    TRIGLYCERIDES 49               Lab 01/23/25 2036   PH, ARTERIAL 7.315*   PCO2, ARTERIAL 54.1*   PO2 ART 94.8   O2 SATURATION ART 96.5   FIO2 32   HCO3 ART 27.5*   BASE EXCESS ART 0.8     Brief Urine Lab Results  (Last result in the past 365 days)        Color   Clarity   Blood   Leuk Est   Nitrite   Protein   CREAT   Urine HCG        01/23/25 1912 Yellow   Turbid   Large (3+)   Moderate (2+)   Negative   >=300 mg/dL (3+)                 [x]  Microbiology   Microbiology Results (last 10 days)       Procedure Component Value - Date/Time    Respiratory Culture - Sputum, Cough [[id number]] Collected: 01/24/25 1618    Lab Status: Preliminary result Specimen: Sputum from Cough Updated: 01/25/25 1032     Respiratory Culture No growth     Gram Stain Few (2+) WBCs seen      Rare (1+) Gram positive cocci      Rare (1+) Gram negative bacilli    S. Pneumo Ag Urine or CSF - Urine, Indwelling Urethral Catheter [[id number]]  (Normal) Collected: 01/23/25 1912    Lab Status: Final result Specimen: Urine from Indwelling Urethral Catheter Updated: 01/24/25 1646     Strep Pneumo Ag Negative    Legionella Antigen, Urine - Urine, Indwelling Urethral Catheter [[id number]]  (Normal) Collected: 01/23/25 1912    Lab Status: Final result Specimen: Urine from Indwelling Urethral Catheter Updated: 01/24/25 1646     LEGIONELLA ANTIGEN, URINE Negative    Blood Culture - Blood, Arm, Right [[id number]]  (Normal) Collected: 01/23/25 1748    Lab Status: Preliminary result Specimen: Blood from Arm, Right Updated: 01/24/25 1800     Blood Culture No growth at 24 hours    Narrative:      Less than seven (7) mL's of blood was collected.  Insufficient quantity may yield false negative results.    Blood Culture - Blood, Hand, Left [[id number]]  (Normal) Collected: 01/23/25 1735    Lab Status: Preliminary result Specimen: Blood from Hand, Left Updated: 01/24/25 1745     Blood Culture No growth at 24 hours    Narrative:      Less than seven (7) mL's of blood  was collected.  Insufficient quantity may yield false negative results.    COVID-19, FLU A/B, RSV PCR 1 HR TAT - Swab, Nasopharynx [[id number]]  (Normal) Collected: 01/23/25 1723    Lab Status: Final result Specimen: Swab from Nasopharynx Updated: 01/23/25 1806     COVID19 Not Detected     Influenza A PCR Not Detected     Influenza B PCR Not Detected     RSV, PCR Not Detected    Narrative:      Fact sheet for providers: https://www.fda.gov/media/378268/download    Fact sheet for patients: https://www.fda.gov/media/845753/download    Test performed by PCR.          [x]  Radiology  CT Chest Without Contrast Diagnostic    Result Date: 1/24/2025  Impression: 1.There is a new somewhat spiculated nodular focus in the superior left lower lobe image #66 of series. The rapid interval development suggests an infectious or inflammatory process. 3-month follow-up CT recommended per Fleischner guidelines. 2.Stable appearance of verrucous and cystic bronchiectasis involving all lobes of the lungs, but greatest in the right upper lobe and lower lobes. There are again adjacent micronodular densities in a peribronchial distribution suggesting an infectious or  inflammatory process. Nontuberculous mycobacterial infection or other atypical agent would be a top considerations. 3.Small bilateral pleural effusions, slightly increased from prior. There is consolidation in the lung bases bilaterally which may be due to atelectasis or additional infiltrates. 4.Increasing pericardial effusion, now measuring up to 2.6 cm posteriorly on the right. 5.Enlarged right paratracheal lymph node measuring up to 1.4 cm, likely reactive. Hilar adenopathy is also suspected, but not well characterized due to noncontrast technique. 6.Additional findings as given above. Electronically Signed: Deshawn Soto MD  1/24/2025 9:43 AM EST  Workstation ID: [id number]    XR Chest 1 View    Result Date: 1/23/2025  Impression: Patchy chronic bilateral lung infiltrates. No  acute infiltrate. Electronically Signed: Amado Salmeron MD  1/23/2025 5:11 PM EST  Workstation ID: [id number]    XR Chest 1 View    Result Date: 1/17/2025  Persistent multifocal pneumonia. Images reviewed, interpreted, and dictated by Dr. Dhruv Duncan. Transcribed by Rosie Monsalve.    CT Chest Without Contrast Diagnostic    Result Date: 1/17/2025  Stable widespread cystic bronchiectasis in both lungs. Improved multifocal pneumonia since the prior study. New small pericardial effusion. Images reviewed, interpreted, and dictated by Francois Hatch MD   []  EKG/Telemetry   []  Cardiology/Vascular   []  Pathology  []  Old records  []  Other:    Assessment & Plan   Assessment / Plan     Assessment  Acute hypoxemic respiratory failure  History of pneumonia with MDRO Pseudomonas  Complicated UTI  Type 2 diabetes on insulin  History of COPD  CKD  Atrial fibrillation on Eliquis  Elevated troponins  Heart failure with preserved ejection fraction  History of BPH  Chronic left heel ulcer  Obstructive sleep apnea     Plan  TTE reviewed, wound care documentation reviewed, discussed with wound care  pulmonology following, recommendations reviewed, possible bronchoscopy if little improvement  cardiology following, recommendations reviewed, possible nuclear stress test Monday  Continue monitoring in MedSurg  Continue telemetry, vitals monitoring  Continue chronic indwelling Doyle catheter  A.m. CBC BMP  Follow-up blood cultures   Follow-up sputum culture, AFB sputum as well  Continue IV meropenem, home inhaled Tobramycin  Continue IV Solu-Medrol   Continue home long-acting inhaler, DuoNebs as needed  Monitor clinically for improvement      VTE Prophylaxis:  Pharmacologic VTE prophylaxis orders are present.        CODE STATUS:   Code Status (Patient has no pulse and is not breathing): CPR (Attempt to Resuscitate)  Medical Interventions (Patient has pulse or is breathing): Full Support      Electronically signed by Matty  MD Justyn, 1/25/2025, 10:48 EST.

## 2025-01-26 ENCOUNTER — ANESTHESIA EVENT (OUTPATIENT)
Dept: PERIOP | Facility: HOSPITAL | Age: 60
End: 2025-01-26
Payer: COMMERCIAL

## 2025-01-26 ENCOUNTER — ANESTHESIA (OUTPATIENT)
Dept: PERIOP | Facility: HOSPITAL | Age: 60
End: 2025-01-26
Payer: COMMERCIAL

## 2025-01-26 PROBLEM — K61.0 PERIANAL ABSCESS: Status: ACTIVE | Noted: 2025-01-23

## 2025-01-26 LAB
GLUCOSE BLDC GLUCOMTR-MCNC: 258 MG/DL (ref 70–99)
GLUCOSE BLDC GLUCOMTR-MCNC: 295 MG/DL (ref 70–99)
GLUCOSE BLDC GLUCOMTR-MCNC: 340 MG/DL (ref 70–99)
GLUCOSE BLDC GLUCOMTR-MCNC: 398 MG/DL (ref 70–99)
GLUCOSE BLDC GLUCOMTR-MCNC: 430 MG/DL (ref 70–99)

## 2025-01-26 PROCEDURE — 87075 CULTR BACTERIA EXCEPT BLOOD: CPT | Performed by: STUDENT IN AN ORGANIZED HEALTH CARE EDUCATION/TRAINING PROGRAM

## 2025-01-26 PROCEDURE — 63710000001 INSULIN LISPRO (HUMAN) PER 5 UNITS: Performed by: STUDENT IN AN ORGANIZED HEALTH CARE EDUCATION/TRAINING PROGRAM

## 2025-01-26 PROCEDURE — 87205 SMEAR GRAM STAIN: CPT | Performed by: STUDENT IN AN ORGANIZED HEALTH CARE EDUCATION/TRAINING PROGRAM

## 2025-01-26 PROCEDURE — 87077 CULTURE AEROBIC IDENTIFY: CPT | Performed by: STUDENT IN AN ORGANIZED HEALTH CARE EDUCATION/TRAINING PROGRAM

## 2025-01-26 PROCEDURE — 94799 UNLISTED PULMONARY SVC/PX: CPT

## 2025-01-26 PROCEDURE — 87206 SMEAR FLUORESCENT/ACID STAI: CPT | Performed by: STUDENT IN AN ORGANIZED HEALTH CARE EDUCATION/TRAINING PROGRAM

## 2025-01-26 PROCEDURE — 99232 SBSQ HOSP IP/OBS MODERATE 35: CPT | Performed by: STUDENT IN AN ORGANIZED HEALTH CARE EDUCATION/TRAINING PROGRAM

## 2025-01-26 PROCEDURE — 11045 DBRDMT SUBQ TISS EACH ADDL: CPT | Performed by: STUDENT IN AN ORGANIZED HEALTH CARE EDUCATION/TRAINING PROGRAM

## 2025-01-26 PROCEDURE — 25010000002 PROPOFOL 10 MG/ML EMULSION: Performed by: ANESTHESIOLOGY

## 2025-01-26 PROCEDURE — 94669 MECHANICAL CHEST WALL OSCILL: CPT

## 2025-01-26 PROCEDURE — 63710000001 INSULIN GLARGINE PER 5 UNITS: Performed by: STUDENT IN AN ORGANIZED HEALTH CARE EDUCATION/TRAINING PROGRAM

## 2025-01-26 PROCEDURE — 25010000002 MEROPENEM PER 100 MG: Performed by: STUDENT IN AN ORGANIZED HEALTH CARE EDUCATION/TRAINING PROGRAM

## 2025-01-26 PROCEDURE — 25010000002 FENTANYL CITRATE (PF) 50 MCG/ML SOLUTION: Performed by: ANESTHESIOLOGY

## 2025-01-26 PROCEDURE — 25010000002 METHYLPREDNISOLONE PER 40 MG: Performed by: STUDENT IN AN ORGANIZED HEALTH CARE EDUCATION/TRAINING PROGRAM

## 2025-01-26 PROCEDURE — 11042 DBRDMT SUBQ TIS 1ST 20SQCM/<: CPT | Performed by: STUDENT IN AN ORGANIZED HEALTH CARE EDUCATION/TRAINING PROGRAM

## 2025-01-26 PROCEDURE — 25010000002 LIDOCAINE PF 2% 2 % SOLUTION: Performed by: ANESTHESIOLOGY

## 2025-01-26 PROCEDURE — 87116 MYCOBACTERIA CULTURE: CPT | Performed by: STUDENT IN AN ORGANIZED HEALTH CARE EDUCATION/TRAINING PROGRAM

## 2025-01-26 PROCEDURE — 87102 FUNGUS ISOLATION CULTURE: CPT | Performed by: STUDENT IN AN ORGANIZED HEALTH CARE EDUCATION/TRAINING PROGRAM

## 2025-01-26 PROCEDURE — 82948 REAGENT STRIP/BLOOD GLUCOSE: CPT

## 2025-01-26 PROCEDURE — 46045 I&D ABSC TRANAL UNDER ANES: CPT | Performed by: STUDENT IN AN ORGANIZED HEALTH CARE EDUCATION/TRAINING PROGRAM

## 2025-01-26 PROCEDURE — 87070 CULTURE OTHR SPECIMN AEROBIC: CPT | Performed by: STUDENT IN AN ORGANIZED HEALTH CARE EDUCATION/TRAINING PROGRAM

## 2025-01-26 PROCEDURE — 87186 SC STD MICRODIL/AGAR DIL: CPT | Performed by: STUDENT IN AN ORGANIZED HEALTH CARE EDUCATION/TRAINING PROGRAM

## 2025-01-26 PROCEDURE — 94664 DEMO&/EVAL PT USE INHALER: CPT

## 2025-01-26 PROCEDURE — 25010000002 ENOXAPARIN PER 10 MG: Performed by: STUDENT IN AN ORGANIZED HEALTH CARE EDUCATION/TRAINING PROGRAM

## 2025-01-26 PROCEDURE — 0JBB0ZZ EXCISION OF PERINEUM SUBCUTANEOUS TISSUE AND FASCIA, OPEN APPROACH: ICD-10-PCS | Performed by: STUDENT IN AN ORGANIZED HEALTH CARE EDUCATION/TRAINING PROGRAM

## 2025-01-26 PROCEDURE — 87015 SPECIMEN INFECT AGNT CONCNTJ: CPT | Performed by: STUDENT IN AN ORGANIZED HEALTH CARE EDUCATION/TRAINING PROGRAM

## 2025-01-26 PROCEDURE — 25810000003 SODIUM CHLORIDE 0.9 % SOLUTION: Performed by: ANESTHESIOLOGY

## 2025-01-26 PROCEDURE — 25010000002 ONDANSETRON PER 1 MG: Performed by: ANESTHESIOLOGY

## 2025-01-26 PROCEDURE — 82948 REAGENT STRIP/BLOOD GLUCOSE: CPT | Performed by: STUDENT IN AN ORGANIZED HEALTH CARE EDUCATION/TRAINING PROGRAM

## 2025-01-26 PROCEDURE — 25010000002 SUGAMMADEX 200 MG/2ML SOLUTION: Performed by: ANESTHESIOLOGY

## 2025-01-26 PROCEDURE — 99222 1ST HOSP IP/OBS MODERATE 55: CPT | Performed by: STUDENT IN AN ORGANIZED HEALTH CARE EDUCATION/TRAINING PROGRAM

## 2025-01-26 PROCEDURE — 63710000001 INSULIN LISPRO (HUMAN) PER 5 UNITS: Performed by: PHYSICIAN ASSISTANT

## 2025-01-26 PROCEDURE — 63710000001 REVEFENACIN 175 MCG/3ML SOLUTION: Performed by: STUDENT IN AN ORGANIZED HEALTH CARE EDUCATION/TRAINING PROGRAM

## 2025-01-26 DEVICE — HEMOST ABS SURGIFOAM 8X3CM BX/5: Type: IMPLANTABLE DEVICE | Site: BUTTOCKS | Status: FUNCTIONAL

## 2025-01-26 RX ORDER — MAGNESIUM HYDROXIDE 1200 MG/15ML
LIQUID ORAL AS NEEDED
Status: DISCONTINUED | OUTPATIENT
Start: 2025-01-26 | End: 2025-01-26 | Stop reason: HOSPADM

## 2025-01-26 RX ORDER — ENOXAPARIN SODIUM 100 MG/ML
40 INJECTION SUBCUTANEOUS NIGHTLY
Status: DISCONTINUED | OUTPATIENT
Start: 2025-01-26 | End: 2025-02-15

## 2025-01-26 RX ORDER — PROPOFOL 10 MG/ML
VIAL (ML) INTRAVENOUS AS NEEDED
Status: DISCONTINUED | OUTPATIENT
Start: 2025-01-26 | End: 2025-01-26 | Stop reason: SURG

## 2025-01-26 RX ORDER — PROMETHAZINE HYDROCHLORIDE 12.5 MG/1
25 TABLET ORAL ONCE AS NEEDED
Status: DISCONTINUED | OUTPATIENT
Start: 2025-01-26 | End: 2025-01-26 | Stop reason: HOSPADM

## 2025-01-26 RX ORDER — SODIUM CHLORIDE 9 MG/ML
40 INJECTION, SOLUTION INTRAVENOUS AS NEEDED
Status: DISCONTINUED | OUTPATIENT
Start: 2025-01-26 | End: 2025-02-16

## 2025-01-26 RX ORDER — OXYCODONE HYDROCHLORIDE 5 MG/1
5 TABLET ORAL
Status: DISCONTINUED | OUTPATIENT
Start: 2025-01-26 | End: 2025-01-26 | Stop reason: HOSPADM

## 2025-01-26 RX ORDER — ROCURONIUM BROMIDE 10 MG/ML
INJECTION, SOLUTION INTRAVENOUS AS NEEDED
Status: DISCONTINUED | OUTPATIENT
Start: 2025-01-26 | End: 2025-01-26 | Stop reason: SURG

## 2025-01-26 RX ORDER — FENTANYL CITRATE 50 UG/ML
INJECTION, SOLUTION INTRAMUSCULAR; INTRAVENOUS AS NEEDED
Status: DISCONTINUED | OUTPATIENT
Start: 2025-01-26 | End: 2025-01-26 | Stop reason: SURG

## 2025-01-26 RX ORDER — MEPERIDINE HYDROCHLORIDE 25 MG/ML
12.5 INJECTION INTRAMUSCULAR; INTRAVENOUS; SUBCUTANEOUS
Status: DISCONTINUED | OUTPATIENT
Start: 2025-01-26 | End: 2025-01-26 | Stop reason: HOSPADM

## 2025-01-26 RX ORDER — SODIUM CHLORIDE 0.9 % (FLUSH) 0.9 %
20 SYRINGE (ML) INJECTION AS NEEDED
Status: DISCONTINUED | OUTPATIENT
Start: 2025-01-26 | End: 2025-02-20 | Stop reason: HOSPADM

## 2025-01-26 RX ORDER — HYDROCODONE BITARTRATE AND ACETAMINOPHEN 5; 325 MG/1; MG/1
1 TABLET ORAL EVERY 6 HOURS PRN
Status: DISPENSED | OUTPATIENT
Start: 2025-01-26 | End: 2025-01-31

## 2025-01-26 RX ORDER — PROMETHAZINE HYDROCHLORIDE 25 MG/1
25 SUPPOSITORY RECTAL ONCE AS NEEDED
Status: DISCONTINUED | OUTPATIENT
Start: 2025-01-26 | End: 2025-01-26 | Stop reason: HOSPADM

## 2025-01-26 RX ORDER — SODIUM CHLORIDE 0.9 % (FLUSH) 0.9 %
10 SYRINGE (ML) INJECTION AS NEEDED
Status: DISCONTINUED | OUTPATIENT
Start: 2025-01-26 | End: 2025-02-20 | Stop reason: HOSPADM

## 2025-01-26 RX ORDER — INSULIN LISPRO 100 [IU]/ML
10 INJECTION, SOLUTION INTRAVENOUS; SUBCUTANEOUS ONCE
Status: COMPLETED | OUTPATIENT
Start: 2025-01-26 | End: 2025-01-26

## 2025-01-26 RX ORDER — ONDANSETRON 2 MG/ML
INJECTION INTRAMUSCULAR; INTRAVENOUS AS NEEDED
Status: DISCONTINUED | OUTPATIENT
Start: 2025-01-26 | End: 2025-01-26 | Stop reason: SURG

## 2025-01-26 RX ORDER — ONDANSETRON 2 MG/ML
4 INJECTION INTRAMUSCULAR; INTRAVENOUS ONCE AS NEEDED
Status: DISCONTINUED | OUTPATIENT
Start: 2025-01-26 | End: 2025-01-26 | Stop reason: HOSPADM

## 2025-01-26 RX ORDER — LIDOCAINE HYDROCHLORIDE 20 MG/ML
INJECTION, SOLUTION EPIDURAL; INFILTRATION; INTRACAUDAL; PERINEURAL AS NEEDED
Status: DISCONTINUED | OUTPATIENT
Start: 2025-01-26 | End: 2025-01-26 | Stop reason: SURG

## 2025-01-26 RX ORDER — HEPARIN SODIUM (PORCINE) LOCK FLUSH IV SOLN 100 UNIT/ML 100 UNIT/ML
5 SOLUTION INTRAVENOUS AS NEEDED
Status: DISCONTINUED | OUTPATIENT
Start: 2025-01-26 | End: 2025-02-20 | Stop reason: HOSPADM

## 2025-01-26 RX ORDER — SODIUM CHLORIDE 0.9 % (FLUSH) 0.9 %
10 SYRINGE (ML) INJECTION EVERY 12 HOURS SCHEDULED
Status: DISCONTINUED | OUTPATIENT
Start: 2025-01-26 | End: 2025-02-20 | Stop reason: HOSPADM

## 2025-01-26 RX ORDER — SODIUM CHLORIDE 9 MG/ML
INJECTION, SOLUTION INTRAVENOUS CONTINUOUS PRN
Status: DISCONTINUED | OUTPATIENT
Start: 2025-01-26 | End: 2025-01-26 | Stop reason: SURG

## 2025-01-26 RX ADMIN — INSULIN LISPRO 5 UNITS: 100 INJECTION, SOLUTION INTRAVENOUS; SUBCUTANEOUS at 12:44

## 2025-01-26 RX ADMIN — Medication 10 ML: at 09:06

## 2025-01-26 RX ADMIN — BUMETANIDE 2 MG: 1 TABLET ORAL at 08:11

## 2025-01-26 RX ADMIN — BUMETANIDE 2 MG: 1 TABLET ORAL at 21:56

## 2025-01-26 RX ADMIN — Medication 10 ML: at 21:55

## 2025-01-26 RX ADMIN — FENTANYL CITRATE 50 MCG: 50 INJECTION, SOLUTION INTRAMUSCULAR; INTRAVENOUS at 15:33

## 2025-01-26 RX ADMIN — ENOXAPARIN SODIUM 40 MG: 100 INJECTION SUBCUTANEOUS at 10:05

## 2025-01-26 RX ADMIN — INSULIN LISPRO 4 UNITS: 100 INJECTION, SOLUTION INTRAVENOUS; SUBCUTANEOUS at 08:11

## 2025-01-26 RX ADMIN — GUAIFENESIN 600 MG: 600 TABLET ORAL at 21:55

## 2025-01-26 RX ADMIN — TRAZODONE HYDROCHLORIDE 150 MG: 100 TABLET ORAL at 21:56

## 2025-01-26 RX ADMIN — ROCURONIUM BROMIDE 50 MG: 50 INJECTION INTRAVENOUS at 15:52

## 2025-01-26 RX ADMIN — INSULIN LISPRO 10 UNITS: 100 INJECTION, SOLUTION INTRAVENOUS; SUBCUTANEOUS at 01:49

## 2025-01-26 RX ADMIN — SODIUM CHLORIDE: 9 INJECTION, SOLUTION INTRAVENOUS at 15:36

## 2025-01-26 RX ADMIN — WATER 40 MG: 1 INJECTION INTRAMUSCULAR; INTRAVENOUS; SUBCUTANEOUS at 01:49

## 2025-01-26 RX ADMIN — GUAIFENESIN 600 MG: 600 TABLET ORAL at 08:11

## 2025-01-26 RX ADMIN — ONDANSETRON 4 MG: 2 INJECTION INTRAMUSCULAR; INTRAVENOUS at 16:26

## 2025-01-26 RX ADMIN — IPRATROPIUM BROMIDE AND ALBUTEROL SULFATE 3 ML: .5; 3 SOLUTION RESPIRATORY (INHALATION) at 16:55

## 2025-01-26 RX ADMIN — MEROPENEM 1000 MG: 1 INJECTION INTRAVENOUS at 01:49

## 2025-01-26 RX ADMIN — TAMSULOSIN HYDROCHLORIDE 0.4 MG: 0.4 CAPSULE ORAL at 08:11

## 2025-01-26 RX ADMIN — SUGAMMADEX 200 MG: 100 INJECTION, SOLUTION INTRAVENOUS at 16:35

## 2025-01-26 RX ADMIN — BUSPIRONE HYDROCHLORIDE 15 MG: 5 TABLET ORAL at 08:11

## 2025-01-26 RX ADMIN — FENTANYL CITRATE 50 MCG: 50 INJECTION, SOLUTION INTRAMUSCULAR; INTRAVENOUS at 15:52

## 2025-01-26 RX ADMIN — CARVEDILOL 25 MG: 25 TABLET, FILM COATED ORAL at 21:55

## 2025-01-26 RX ADMIN — Medication 10 ML: at 21:57

## 2025-01-26 RX ADMIN — PROPOFOL 150 MG: 10 INJECTION, EMULSION INTRAVENOUS at 15:52

## 2025-01-26 RX ADMIN — BUDESONIDE 0.5 MG: 0.5 INHALANT RESPIRATORY (INHALATION) at 20:43

## 2025-01-26 RX ADMIN — ARFORMOTEROL TARTRATE 15 MCG: 15 SOLUTION RESPIRATORY (INHALATION) at 20:43

## 2025-01-26 RX ADMIN — INSULIN GLARGINE 20 UNITS: 100 INJECTION, SOLUTION SUBCUTANEOUS at 21:56

## 2025-01-26 RX ADMIN — MONTELUKAST 10 MG: 10 TABLET, FILM COATED ORAL at 21:55

## 2025-01-26 RX ADMIN — INSULIN LISPRO 6 UNITS: 100 INJECTION, SOLUTION INTRAVENOUS; SUBCUTANEOUS at 21:56

## 2025-01-26 RX ADMIN — COLLAGENASE SANTYL 1 APPLICATION: 250 OINTMENT TOPICAL at 11:00

## 2025-01-26 RX ADMIN — DULOXETINE HYDROCHLORIDE 60 MG: 30 CAPSULE, DELAYED RELEASE ORAL at 08:10

## 2025-01-26 RX ADMIN — BUSPIRONE HYDROCHLORIDE 15 MG: 5 TABLET ORAL at 21:56

## 2025-01-26 RX ADMIN — NIFEDIPINE 30 MG: 30 TABLET, FILM COATED, EXTENDED RELEASE ORAL at 08:11

## 2025-01-26 RX ADMIN — TOBRAMYCIN 300 MG: 300 SOLUTION RESPIRATORY (INHALATION) at 09:18

## 2025-01-26 RX ADMIN — MEROPENEM 1000 MG: 1 INJECTION INTRAVENOUS at 15:57

## 2025-01-26 RX ADMIN — FAMOTIDINE 40 MG: 20 TABLET ORAL at 07:11

## 2025-01-26 RX ADMIN — SODIUM CHLORIDE: 9 INJECTION, SOLUTION INTRAVENOUS at 16:38

## 2025-01-26 RX ADMIN — ASPIRIN 81 MG: 81 TABLET, COATED ORAL at 07:11

## 2025-01-26 RX ADMIN — WATER 40 MG: 1 INJECTION INTRAMUSCULAR; INTRAVENOUS; SUBCUTANEOUS at 10:06

## 2025-01-26 RX ADMIN — FINASTERIDE 5 MG: 5 TABLET, FILM COATED ORAL at 08:11

## 2025-01-26 RX ADMIN — TOBRAMYCIN 300 MG: 300 SOLUTION RESPIRATORY (INHALATION) at 20:43

## 2025-01-26 RX ADMIN — FERROUS SULFATE TAB 325 MG (65 MG ELEMENTAL FE) 325 MG: 325 (65 FE) TAB at 08:11

## 2025-01-26 RX ADMIN — REVEFENACIN 175 MCG: 175 SOLUTION RESPIRATORY (INHALATION) at 09:18

## 2025-01-26 RX ADMIN — ARFORMOTEROL TARTRATE 15 MCG: 15 SOLUTION RESPIRATORY (INHALATION) at 09:18

## 2025-01-26 RX ADMIN — BUDESONIDE 0.5 MG: 0.5 INHALANT RESPIRATORY (INHALATION) at 09:18

## 2025-01-26 RX ADMIN — LIDOCAINE HYDROCHLORIDE 100 MG: 20 INJECTION, SOLUTION INTRAVENOUS at 15:52

## 2025-01-26 RX ADMIN — MEROPENEM 1000 MG: 1 INJECTION INTRAVENOUS at 12:44

## 2025-01-26 RX ADMIN — CARVEDILOL 25 MG: 25 TABLET, FILM COATED ORAL at 08:11

## 2025-01-26 RX ADMIN — ATORVASTATIN CALCIUM 20 MG: 10 TABLET, FILM COATED ORAL at 08:11

## 2025-01-26 RX ADMIN — INSULIN LISPRO 4 UNITS: 100 INJECTION, SOLUTION INTRAVENOUS; SUBCUTANEOUS at 08:12

## 2025-01-26 RX ADMIN — MEROPENEM 1000 MG: 1 INJECTION INTRAVENOUS at 23:54

## 2025-01-26 NOTE — PROGRESS NOTES
[facility]   Hospitalist Progress Note  Date: 2025  Patient Name: Preston Wallis  : 1965  MRN: [id number]  Date of admission: 2025  Room/Bed: 360/1      Subjective   Subjective     Chief Complaint:   Chief Complaint   Patient presents with   • Shortness of Breath       Summary:   59 y.o. male past medical history of COPD, hypertension, CHF, history of MDRO presents to the ED due to shortness of breath.  Patient was discharged on  for pneumonia.  Patient was discharged with IV ertapenem for his MDRO.  Patient states since being discharged his symptoms did not improve and continued to worsen.  At that time patient was discharged with home oxygen of 2 L and currently on 5 L.  Denies fever, chills, chest pain, abdominal pain, nausea, vomiting, diarrhea, headache or dysuria.  In the ED, patient's vitals showed temperature 97.6, heart rate 90 and blood pressure 155/73.  Patient's labs show troponin 94 and repeat 92, BNP 1100, sodium 135, creatinine 2.1, glucose 252, AST 49 ALT 42, white blood cell 24.2 and hemoglobin 9.0.  UA shows white blood cells with leukocytes and nitrites.  Chest x-ray shows chronic bilateral lung infiltrates.  Patient admitted to floors for further management.     Interval Followup:   Patient remains on 2 L nasal cannula, states that his symptoms are necessarily worse but just have not improved.  CT was performed to arrival of the chest which demonstrates new spiculated left lower lobe nodule, bronchiectasis throughout both lungs, micronodule or peribronchial densities suggestive of atypical infection, right paratracheal lymph node and enlarging pericardial effusion.  Pulmonology and cardiology services consulted.  TTE demonstrates very small pericardial effusion only, estimated EF 49%, with some grade 1 diastolic dysfunction left ventricle    25  Leukocytosis remains stable.  CT abdomen and pelvis  notable for 5 cm perianal abscess.  I discussed this with  the general surgeon Dr. Canada in consultation, who plans for operative intervention today.    Objective   Objective     Vitals:   Temp:  [97.7 °F (36.5 °C)-98.4 °F (36.9 °C)] 98.1 °F (36.7 °C)  Heart Rate:  [86-98] 98  Resp:  [18-20] 18  BP: (145-158)/(54-63) 147/57  Flow (L/min) (Oxygen Therapy):  [2-4] 4    Physical Exam   General: Awake, alert, in no acute distress  HENT: Atraumatic, normocephalic. Nasal cannula in place 2 L minute oxygen flow rate  Eyes: pupils equal, round, without scleral icterus  Cardiovascular: Regular rate and rhythm  Pulmonary: No respiratory distress; no conversational dyspnea.  Gastrointestinal: Soft nontender nondistended  Musculoskeletal: No gross deformities      Result Review    Result Review:  I have personally reviewed these results:  [x]  Laboratory      Lab 01/25/25  0349 01/24/25  0534 01/23/25  1735 01/23/25  1614   WBC 16.29* 16.89*  --  24.28*   HEMOGLOBIN 7.9* 8.6*  --  9.0*   HEMATOCRIT 26.2* 29.3*  --  30.2*   PLATELETS 423 405  --  419   NEUTROS ABS 14.57* 15.43*  --  21.34*   IMMATURE GRANS (ABS) 0.16* 0.18*  --  0.22*   LYMPHS ABS 0.74 0.93  --  1.17   MONOS ABS 0.81 0.33  --  1.28*   EOS ABS 0.00 0.00  --  0.22   MCV 90.3 92.4  --  91.8   SED RATE  --  102*  --   --    CRP  --  7.03*  --   --    LACTATE  --   --  0.9  --          Lab 01/25/25  0349 01/24/25  0534 01/23/25  1614   SODIUM 140 136 135*   POTASSIUM 4.2 4.8 4.9   CHLORIDE 101 99 94*   CO2 28.8 25.8 30.4*   ANION GAP 10.2 11.2 10.6   BUN 63* 51* 50*   CREATININE 2.11* 2.14* 2.16*   EGFR 35.4* 34.8* 34.4*   GLUCOSE 363* 301* 252*   CALCIUM 7.9* 8.0* 8.6         Lab 01/23/25  1614   TOTAL PROTEIN 7.9   ALBUMIN 3.1*   GLOBULIN 4.8   ALT (SGPT) 42*   AST (SGOT) 49*   BILIRUBIN 0.2   ALK PHOS 200*         Lab 01/24/25  0534 01/23/25  1735 01/23/25  1614   PROBNP  --   --  1,193.0*   HSTROP T 77* 92* 94*         Lab 01/25/25  0349   CHOLESTEROL 116   LDL CHOL 50   HDL CHOL 54   TRIGLYCERIDES 49               Lab  01/23/25 2036   PH, ARTERIAL 7.315*   PCO2, ARTERIAL 54.1*   PO2 ART 94.8   O2 SATURATION ART 96.5   FIO2 32   HCO3 ART 27.5*   BASE EXCESS ART 0.8     Brief Urine Lab Results  (Last result in the past 365 days)        Color   Clarity   Blood   Leuk Est   Nitrite   Protein   CREAT   Urine HCG        01/23/25 1912 Yellow   Turbid   Large (3+)   Moderate (2+)   Negative   >=300 mg/dL (3+)                 [x]  Microbiology   Microbiology Results (last 10 days)       Procedure Component Value - Date/Time    Respiratory Culture - Sputum, Cough [[id number]] Collected: 01/24/25 1618    Lab Status: Preliminary result Specimen: Sputum from Cough Updated: 01/25/25 1032     Respiratory Culture No growth     Gram Stain Few (2+) WBCs seen      Rare (1+) Gram positive cocci      Rare (1+) Gram negative bacilli    AFB Culture - Sputum, Cough [[id number]] Collected: 01/24/25 1618    Lab Status: Preliminary result Specimen: Sputum from Cough Updated: 01/25/25 1334     AFB Stain No acid fast bacilli seen on direct smear    S. Pneumo Ag Urine or CSF - Urine, Indwelling Urethral Catheter [[id number]]  (Normal) Collected: 01/23/25 1912    Lab Status: Final result Specimen: Urine from Indwelling Urethral Catheter Updated: 01/24/25 1646     Strep Pneumo Ag Negative    Legionella Antigen, Urine - Urine, Indwelling Urethral Catheter [[id number]]  (Normal) Collected: 01/23/25 1912    Lab Status: Final result Specimen: Urine from Indwelling Urethral Catheter Updated: 01/24/25 1646     LEGIONELLA ANTIGEN, URINE Negative    Blood Culture - Blood, Arm, Right [[id number]]  (Normal) Collected: 01/23/25 1748    Lab Status: Preliminary result Specimen: Blood from Arm, Right Updated: 01/25/25 1800     Blood Culture No growth at 2 days    Narrative:      Less than seven (7) mL's of blood was collected.  Insufficient quantity may yield false negative results.    Blood Culture - Blood, Hand, Left [[id number]]  (Normal) Collected: 01/23/25 1735    Lab Status:  Preliminary result Specimen: Blood from Hand, Left Updated: 01/25/25 1745     Blood Culture No growth at 2 days    Narrative:      Less than seven (7) mL's of blood was collected.  Insufficient quantity may yield false negative results.    COVID-19, FLU A/B, RSV PCR 1 HR TAT - Swab, Nasopharynx [[id number]]  (Normal) Collected: 01/23/25 1723    Lab Status: Final result Specimen: Swab from Nasopharynx Updated: 01/23/25 1806     COVID19 Not Detected     Influenza A PCR Not Detected     Influenza B PCR Not Detected     RSV, PCR Not Detected    Narrative:      Fact sheet for providers: https://www.fda.gov/media/676909/download    Fact sheet for patients: https://www.fda.gov/media/967318/download    Test performed by PCR.          [x]  Radiology  CT Abdomen Pelvis Without Contrast    Result Date: 1/25/2025  Impression: 1.There is a posterior perianal abscess as detailed above. No intrapelvic extension. 2.Cardiomegaly with relatively small pericardial effusion. There is bibasilar atelectasis with trace pleural effusions. 3.Other incidental nonemergent findings as detailed above. Electronically Signed: Rob Milner MD  1/25/2025 10:55 AM EST  Workstation ID: [id number]    CT Chest Without Contrast Diagnostic    Result Date: 1/24/2025  Impression: 1.There is a new somewhat spiculated nodular focus in the superior left lower lobe image #66 of series. The rapid interval development suggests an infectious or inflammatory process. 3-month follow-up CT recommended per Fleischner guidelines. 2.Stable appearance of verrucous and cystic bronchiectasis involving all lobes of the lungs, but greatest in the right upper lobe and lower lobes. There are again adjacent micronodular densities in a peribronchial distribution suggesting an infectious or  inflammatory process. Nontuberculous mycobacterial infection or other atypical agent would be a top considerations. 3.Small bilateral pleural effusions, slightly increased from prior. There is  consolidation in the lung bases bilaterally which may be due to atelectasis or additional infiltrates. 4.Increasing pericardial effusion, now measuring up to 2.6 cm posteriorly on the right. 5.Enlarged right paratracheal lymph node measuring up to 1.4 cm, likely reactive. Hilar adenopathy is also suspected, but not well characterized due to noncontrast technique. 6.Additional findings as given above. Electronically Signed: Deshawn Soto MD  1/24/2025 9:43 AM EST  Workstation ID: [id number]    XR Chest 1 View    Result Date: 1/23/2025  Impression: Patchy chronic bilateral lung infiltrates. No acute infiltrate. Electronically Signed: Amado Salmeron MD  1/23/2025 5:11 PM EST  Workstation ID: [id number]   []  EKG/Telemetry   []  Cardiology/Vascular   []  Pathology  []  Old records  []  Other:    Assessment & Plan   Assessment / Plan     Assessment  Acute hypoxemic respiratory failure  History of pneumonia with MDRO Pseudomonas  Complicated UTI  Type 2 diabetes on insulin  History of COPD  CKD  Atrial fibrillation on Eliquis  Elevated troponins  Heart failure with preserved ejection fraction  History of BPH  Chronic left heel ulcer  Obstructive sleep apnea     Plan    -Pulmonology following, recommendations reviewed, possible bronchoscopy if little improvement  -Cardiology following, recommendations reviewed, possible nuclear stress test Monday  -General Surgeon consulted, recommendations reviewed, OR possibly today for I/D  -Hold Eliquis, give Lovenox for DVT prophylaxis  -[facility]  -Continue telemetry, vitals monitoring  -Continue chronic indwelling Doyle catheter  -A.m. CBC BMP  -Follow-up blood cultures   -Follow-up sputum culture  -Continue IV meropenem, home inhaled Tobramycin  -Continue IV Solu-Medrol   -Continue home long-acting inhaler, DuoNebs as needed      VTE Prophylaxis:  Pharmacologic VTE prophylaxis orders are present.        CODE STATUS:   Code Status (Patient has no pulse and is not  breathing): CPR (Attempt to Resuscitate)  Medical Interventions (Patient has pulse or is breathing): Full Support      Electronically signed by Matty Alejandra MD, 1/26/2025, 12:18 EST.

## 2025-01-26 NOTE — ANESTHESIA POSTPROCEDURE EVALUATION
Patient: Preston Wallis    Procedure Summary       Date: 01/26/25 Room / Location: [facility] OR  / [facility]    Anesthesia Start: 1547 Anesthesia Stop: 1654    Procedure: INCISION AND DRAINAGE ABSCESS; plain text: incision and drain pus from buttocks wound Diagnosis:       Perianal abscess      (Perianal abscess [K61.0])    Surgeons: Emerson Canada MD Provider: Jordon Morgan MD    Anesthesia Type: general ASA Status: 4 - Emergent            Anesthesia Type: general    Vitals  Vitals Value Taken Time   /63 01/26/25 1726   Temp 36.4 °C (97.5 °F) 01/26/25 1715   Pulse 83 01/26/25 1732   Resp 16 01/26/25 1720   SpO2 94 % 01/26/25 1732   Vitals shown include unfiled device data.        Post Anesthesia Care and Evaluation    Patient location during evaluation: bedside  Patient participation: complete - patient participated  Level of consciousness: awake  Pain management: adequate    Airway patency: patent  PONV Status: none  Cardiovascular status: acceptable and stable  Respiratory status: acceptable  Hydration status: acceptable

## 2025-01-26 NOTE — PLAN OF CARE
Goal Outcome Evaluation:  Plan of Care Reviewed With: patient        Progress: no change  Outcome Evaluation: A/ox4, VSS, ACHS checks, NPO starting at midnight 1/27 MON, Pt had INCISION AND DRAINAGE ABSCESS- incision and drain pus from buttocks wound today, POC ongoing

## 2025-01-26 NOTE — PROGRESS NOTES
Pulmonary / Critical Care Progress Note      Patient Name: Preston Wallis  : 1965  MRN: [id number]  Attending:  Matty Alejandra MD  Date of admission: 2025    Subjective   Subjective   Follow-up for acute on chronic hypoxic respiratory failure    Over past 24 hours:  No acute events overnight  On 4 L nasal cannula  Surgery team in room assessing for perianal abscess  Respiratory culture no growth    Review of Systems  General: Denied complaints  Cardiovascular:  Denied complaints  Respiratory: + Dyspnea on exertion; denied complaints  Gastrointestinal: Denied complaints        Objective   Objective     Vitals:   Temp:  [97.7 °F (36.5 °C)-98.4 °F (36.9 °C)] 98.1 °F (36.7 °C)  Heart Rate:  [86-98] 98  Resp:  [18-20] 18  BP: (145-158)/(54-63) 147/57  Flow (L/min) (Oxygen Therapy):  [2-4] 4    Physical Exam   Vital Signs Reviewed   General:  WDWN, Alert, NAD.    HEENT:  PERRL, EOMI.  OP, nares clear  Chest: Diminished on auscultation bilaterally, no work of breathing noted on 4L nasal cannula  CV: RRR, no MGR, pulses 2+, equal.  Abd:  Soft, NT, ND, + BS, obese  EXT:  no clubbing, no cyanosis, no edema  Neuro:  A&Ox3, CN grossly intact, no focal deficits.  Skin: No rashes or lesions noted      Result Review    Result Review:  I have personally reviewed the results from the time of this admission to 2025 13:36 EST and agree with these findings:  []  Laboratory  []  Microbiology  []  Radiology  []  EKG/Telemetry   []  Cardiology/Vascular   []  Pathology  []  Old records  []  Other:  Most notable findings include:   -     Assessment & Plan   Assessment / Plan     Active Hospital Problems:  Active Hospital Problems    Diagnosis    • **PNA (pneumonia)    • Perianal abscess          Impression:  Cystic bronchiectasis with acute exacerbation  History of MDR Pseudomonas infection on chronic MOIZ neb  Difficulty with airway clearance  Obesity with BMI of 36  Acute on chronic hypoxic and hypercapnic respiratory  failure     Plan:  Continue supplemental oxygen via nasal cannula.  Continue with Brovana, Pulmicort nebulizers twice daily.    Continue with Yupelri nebulizer once daily.  Continue chest vest 4 times daily.  Echocardiogram per primary service  Continue with meropenem IV for total 7 days   Continue with MOIZ nebs  Continue Solu-Medrol 40 mg every 8 hours.  Can start weaning tomorrow.  Continue Singulair once daily.  Continue to follow sputum culture.  No growth to date.  If no significant improvement, will consider bronchoscopy     I have reviewed labs, imaging, pertinent clinical data and provider notes.   I have discussed with bedside nurse and primary service.    VTE Prophylaxis:  Pharmacologic VTE prophylaxis orders are present.        CODE STATUS:   Code Status (Patient has no pulse and is not breathing): CPR (Attempt to Resuscitate)  Medical Interventions (Patient has pulse or is breathing): Full Support      Labs, images, and medications personally reviewed.  Discussed with patient  Electronically signed by Deisi Nichole MD, 01/26/25, 1:37 PM EST.

## 2025-01-26 NOTE — CONSULTS
"[facility]   Consult    Patient Name: Preston Wallis  : 1965  MRN: [id number]  Primary Care Physician:  Kimmy Riley MD  Date of admission: 2025    Subjective   Subjective     Chief Complaint: Shortness of breath    Consult Reason: Perianal abscess    HPI: Preston Wallis is a 59 y.o. male who presented to the hospital last week complaining of increasing shortness of breath.  He was admitted for acute hypoxemic respiratory failure.  Patient is chronically bedbound secondary to a crush injury to his left leg for which she is unable to bear weight.  He states he is able to get around with a wheelchair, but is otherwise in bed.  He has had a pressure wound around his perianal area for some time and underwent a CT abdomen pelvis which was suggestive of an abscess.  General surgery was consulted for management    Review of Systems   HENT:  Negative for sore throat.    Respiratory:  Positive for shortness of breath.    Cardiovascular:  Negative for chest pain.   Genitourinary:  Negative for difficulty urinating.   Neurological:  Negative for dizziness.   Psychiatric/Behavioral:  Negative for confusion.        Personal History     Past Medical History:   Diagnosis Date   • Age-related cognitive decline    • Allergic contact dermatitis    • Allergies    • Anemia    • Bedbound     2023 \"MY LEG MUSCLES STOPPED WORKING\"   • Bronchiectasis with acute lower respiratory infection    • Charcot foot due to diabetes mellitus 09/10/2013   • Chronic diastolic (congestive) heart failure    • Chronic kidney disease    • Chronic respiratory failure with hypoxia    • Closed supracondylar fracture of femur 2022   • COPD (chronic obstructive pulmonary disease)    • Deep vein thrombosis (DVT) of lower extremity associated with air travel 2023   • Dependence on supplemental oxygen    • Eczema    • Erectile dysfunction     due to organic reasons   • Essential (primary) hypertension    • Fracture     closed " "fracture of other tarsal and metatarsal bones   • Fracture of proximal humerus 01/13/2023   • GERD without esophagitis    • High risk medication use    • Hypercholesteremia    • Hypomagnesemia    • Infected stasis ulcer of left lower extremity 01/13/2023   • Insomnia    • Low back pain    • Major depressive disorder    • Morbid (severe) obesity due to excess calories    • MRSA pneumonia    • Muscle weakness    • 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 without evidence of necrosis    • Obstructive sleep apnea (adult) (pediatric)    • On home O2     REPORTS WEARING 2L/NC AAT   • Other forms of dyspnea    • Other long term (current) drug therapy    • Other specified noninfective gastroenteritis and colitis    • Other spondylosis, lumbar region    • Pain in both knees    • Paroxysmal atrial fibrillation    • Peripheral neuropathy     attributed to type 2 diabetes   • Pneumonia, unspecified organism    • Polyneuropathy    • Rash and other nonspecific skin eruption    • Self-catheterizes urinary bladder     EVERY 4 HOURS   • Smoking     \"SOMETIMES\"   • Syncope and collapse    • Tachycardia    • Tinnitus 01/13/2023   • Type 1 diabetes mellitus with diabetic chronic kidney disease    • Type 2 diabetes mellitus    • Unspecified fall, initial encounter    • Urinary retention        Past Surgical History:   Procedure Laterality Date   • CARDIAC CATHETERIZATION Left 8/15/2024    Procedure: Carbon dioxide aortogram with left leg angiogram, possible angioplasty or stenting;  Surgeon: Moshe Willson MD;  Location: [facility] INVASIVE LOCATION;  Service: Vascular;  Laterality: Left;   • CHOLECYSTECTOMY     • CYSTOSCOPY     • FEMUR SURGERY Left     Shravan placed   • KNEE SURGERY Left    • OTHER SURGICAL HISTORY Left     venous port, REMOVED   • PORTACATH PLACEMENT Right    • TIBIAL PLATEAU OPEN REDUCTION INTERNAL FIXATION Left 12/22/2023    Procedure: TIBIAL PLATEAU OPEN REDUCTION INTERNAL " FIXATION;  Surgeon: Hugo Kline MD;  Location: [facility];  Service: Orthopedics;  Laterality: Left;   • TONSILLECTOMY AND ADENOIDECTOMY         Family History: family history includes Asthma in his father; Cancer in his sister; Coronary artery disease in his mother; Diabetes type II in his mother and sister; Hypertension in his mother. Otherwise pertinent FHx was reviewed and not pertinent to current issue.    Social History:  reports that he quit smoking about 32 years ago. His smoking use included cigarettes. He started smoking about 44 years ago. He has a 12 pack-year smoking history. He has been exposed to tobacco smoke. He has never used smokeless tobacco. He reports that he does not currently use alcohol. He reports that he does not use drugs.    Home Medications:  DULoxetine, FreeStyle Bethany 3 Plus Sensor, GNP One Daily Plus Iron, Insulin Lispro (1 Unit Dial), Magnesium Oxide -Mg Supplement, NIFEdipine XL, O2, Semaglutide (1 MG/DOSE), Vitamin D3, apixaban, arformoterol, aspirin, atorvastatin, budesonide, bumetanide, busPIRone, calcium citrate, carvedilol, dapagliflozin, docusate sodium, famotidine, ferrous gluconate, finasteride, folic acid, insulin detemir, ipratropium-albuterol, montelukast, saccharomyces boulardii, tamsulosin, tiotropium, tobramycin PF, traZODone, and vitamin C    Allergies:  Allergies   Allergen Reactions   • Benadryl [Diphenhydramine] Itching   • Proventil [Albuterol] Other (See Comments)     Mouth sores         Objective    Objective     Vitals:   Temp:  [97.7 °F (36.5 °C)-99.3 °F (37.4 °C)] 98.1 °F (36.7 °C)  Heart Rate:  [86-95] 94  Resp:  [18-20] 18  BP: (145-158)/(54-63) 145/63  Flow (L/min) (Oxygen Therapy):  [2-4] 4    Physical Exam  Constitutional:       Appearance: Normal appearance.   HENT:      Head: Normocephalic and atraumatic.      Mouth/Throat:      Mouth: Mucous membranes are moist.      Pharynx: Oropharynx is clear.   Cardiovascular:      Rate and  Rhythm: Normal rate and regular rhythm.   Pulmonary:      Effort: Pulmonary effort is normal. No respiratory distress.   Abdominal:      General: There is no distension.      Palpations: Abdomen is soft.      Tenderness: There is no abdominal tenderness.   Genitourinary:     Comments: Perianal eschar with fibrinous debris and area of fluctuance  Musculoskeletal:         General: No swelling. Normal range of motion.      Cervical back: Normal range of motion and neck supple.   Skin:     General: Skin is warm and dry.   Neurological:      General: No focal deficit present.      Mental Status: He is alert and oriented to person, place, and time.   Psychiatric:         Mood and Affect: Mood normal.         Behavior: Behavior normal.         Result Review    Result Review:  I have personally reviewed the results from the time of this admission to 1/26/2025 11:04 EST and agree with these findings:  [x]  Laboratory  []  Microbiology  [x]  Radiology  []  EKG/Telemetry   []  Cardiology/Vascular   []  Pathology  []  Old records  []  Other:  Most notable findings include: Perianal abscess    Assessment & Plan   Assessment / Plan     Brief Patient Summary:  Preston Wallis is a 59 y.o. male who presents to the hospital with acute hypoxemic respiratory failure.  He is chronically bedbound and has evidence of a nonviable perianal wound with associated abscess.    Active Hospital Problems:  Active Hospital Problems    Diagnosis    • **PNA (pneumonia)        Plan:   Perianal abscess  -Afebrile, vitals stable, no labs from today  -CT abdomen pelvis proximally 5 cm perianal abscess  -Due to proximity to the anal verge and other comorbidities occluding his mobility issues he is at high risk for requiring fecal diversion  -Plan today for incision and drainage of perianal abscess and any other indicated procedure  -Risks/benefits/alternatives of the procedure were explained to the patient and he was agreeable to proceed    Electronically  signed by Emerson Canada MD, 01/26/25, 11:04 AM EST.

## 2025-01-26 NOTE — PROGRESS NOTES
[facility]   Progress Note    Patient Name: Preston Wallis  : 1965  MRN: [id number]  Primary Care Physician: Kimmy Riley MD  Date of admission: 2025    Subjective   Subjective     HPI:  Patient without chest pain or shortness of breath at rest, to be evaluated tomorrow with a nuclear study.  Patient has multiple CAD risk factors    Review of Systems  Review of Systems   Constitutional: Negative.    HENT: Negative.     Eyes: Negative.    Respiratory:  Positive for cough and wheezing.    Cardiovascular: Negative.    Gastrointestinal: Negative.    Endocrine: Negative.    Genitourinary: Negative.    Musculoskeletal:  Positive for arthralgias.        Patient has history of femoral fracture in the past, skin abnormalities related to diabetes, pressure ulcers   Skin:  Positive for pallor.   Neurological: Negative.    Psychiatric/Behavioral: Negative.         Objective   Objective     Vitals:  Temp:  [97.7 °F (36.5 °C)-98.4 °F (36.9 °C)] 98.1 °F (36.7 °C)  Heart Rate:  [86-98] 98  Resp:  [18-20] 18  BP: (145-158)/(54-63) 147/57  Flow (L/min) (Oxygen Therapy):  [2-4] 4    Physical Exam:  Physical Exam  Constitutional:       Appearance: He is obese.   HENT:      Head: Normocephalic.   Cardiovascular:      Rate and Rhythm: Normal rate and regular rhythm.   Pulmonary:      Breath sounds: Wheezing and rhonchi present.   Abdominal:      Palpations: Abdomen is soft.   Musculoskeletal:         General: Normal range of motion.      Comments: Multiple abnormalities in the lower extremities related to diabetes and bone fracture also pressure ulcer   Skin:     General: Skin is warm.   Neurological:      General: No focal deficit present.      Mental Status: He is alert.         Result Review    Result Review:  I have personally reviewed the results from the time of this admission to 25 2:19 PM EST and agree with these findings:  [x]  Laboratory  []  Microbiology  []  Radiology  [x]  EKG/Telemetry   []   Cardiology/Vascular   []  Pathology  []  Old records  []  Other:    Most notable findings include: 59-year-old gentleman admitted to the hospital because of shortness of breath he has pneumonia patient has sinus tachycardia and ST abnormalities in the EKG, patient has long history of diabetes, chronic kidney disease.  Echo LVH, diastolic dysfunction.  Patient has peripheral vascular disease.  Assessment & Plan   Assessment / Plan     Active Hospital Problems:  Active Hospital Problems    Diagnosis    • **PNA (pneumonia)    • Perianal abscess        Plan:   Patient will be evaluated in the morning with a nuclear study.    DVT prophylaxis: As per internal medicine    CODE STATUS:    Code Status and Medical Interventions: CPR (Attempt to Resuscitate); Full Support   Ordered at: 01/23/25 1959     Code Status (Patient has no pulse and is not breathing):    CPR (Attempt to Resuscitate)     Medical Interventions (Patient has pulse or is breathing):    Full Support       Disposition:  I expect patient to be discharged needs for evaluation.    Electronically signed by Alec Padron MD, 01/26/25, 2:19 PM EST.

## 2025-01-26 NOTE — OP NOTE
INCISION AND DRAINAGE ABSCESS  Procedure Report    Patient Name:  Preston Wallis  YOB: 1965    Date of Surgery:  1/26/2025     Indications:  Perianal abscess    Pre-op Diagnosis:   Perianal abscess [K61.0]       Post-Op Diagnosis Codes:     * Perianal abscess [K61.0]    Procedure/CPT® Codes:  No CPT Code Applied in Case Entry    Procedure(s):  Incision and drainage of posterior perianal abscess  Sharp excisional debridement through skin and subcutaneous tissue in the posterior perianal area, 9 x 6 x 1 cm        Staff:  Surgeon(s):  Emerson Canada MD    Assistant: Kalli Leal RN CSA    Anesthesia: Choice    Estimated Blood Loss: minimal    Implants:    Nothing was implanted during the procedure    Specimen:          None        Findings: Grossly nonviable skin and subcutaneous tissue along the posterior midline of the perianal area extending to the anal verge    Complications: None apparent at the time of surgery    Description of Procedure: Informed consent was obtained before the patient was brought to the operating suite and placed in the prone jackknife position.  General anesthesia was induced and maintained throughout the procedure.  The patient's perianal area was prepped and draped in the standard sterile fashion before a timeout procedure was performed, verifying the correct patient, procedure, and other pertinent information.    Using a #15 blade scalpel, nonviable skin and subcutaneous tissue was sharply debrided along the posterior midline of the perianal area.  A small abscess was encountered with all loculations easily broken up bluntly; a wound culture was obtained from this fluid and sent for analysis.  Necrotic tissue was noted to extend to the margin of the anal verge.  A slotted anoscope was inserted and confirmed healthy appearing mucosa within the anal canal; scope was then removed.  Sharp excisional debridement through skin and subcutaneous tissue was performed until healthy,  bleeding tissue was encountered circumferentially.  Total area of debridement was 9 x 6 x 1 cm at the conclusion of the procedure.  Hemostasis was verified and achieved with electrocautery.  Wound was scrubbed with a Betadine soaked scrub brush.  There was no evidence of tunneling or any further fluid collection.  Wound was then packed with a small piece of Surgicel gauze and Betadine soaked 4 x 4's before application of a sterile dressing over top to conclude the procedure.    Patient tolerated the procedure well and there were no immediate complications.  All counts were correct x 2 at the end of the procedure.    Disposition: Stable in PACU - will need fecal diversion later this week        The surgical first assist listed above assisted with all needed aspects of the procedure.    Electronically signed by Emerson Canada MD, 01/26/25, 4:29 PM EST.

## 2025-01-26 NOTE — PLAN OF CARE
Goal Outcome Evaluation:  Plan of Care Reviewed With: patient        Progress: declining  Outcome Evaluation: A/ox4. O2 dropped to 85% increased to 4.5 L NC. Blood glucose >400x2. Plan of care ongoing.

## 2025-01-26 NOTE — ANESTHESIA PREPROCEDURE EVALUATION
Anesthesia Evaluation     Patient summary reviewed and Nursing notes reviewed   no history of anesthetic complications:   NPO Solid Status: > 8 hours  NPO Liquid Status: > 2 hours           Airway   Mallampati: II  TM distance: >3 FB  Neck ROM: full  No difficulty expected  Dental    (+) edentulous    Pulmonary - normal exam    breath sounds clear to auscultation  (+) pneumonia , a smoker Former, COPD,home oxygen, sleep apnea  Cardiovascular - normal exam  Exercise tolerance: poor (<4 METS)    PT is on anticoagulation therapy  Rhythm: regular  Rate: normal    (+) hypertension, dysrhythmias Paroxysmal Atrial Fib, CHF , PVD, DVT, hyperlipidemia    ROS comment: Chest pain and elevated trop on arrival.  Schedule for cardiac stress tomorrow.      Neuro/Psych  (+) seizures, numbness    ROS Comment: Non ambulatory   GI/Hepatic/Renal/Endo    (+) obesity, renal disease- CRI, diabetes mellitus poorly controlled    Musculoskeletal     Abdominal    Substance History      OB/GYN          Other   arthritis,                Interpretation Summary 1/23/25         Left ventricular systolic function is low normal. Calculated left ventricular EF = 49.4%    Left ventricular wall thickness is consistent with moderate concentric hypertrophy.    Left ventricular diastolic function is consistent with (grade I) impaired relaxation.    There is a small (<1cm) pericardial effusion adjacent to the right ventricle.     Scheduled for cardiac stress test tomorrow   Will proceed with ID given emergent    Last eliquis 1/25 pm       Anesthesia Plan    ASA 4 - emergent     general     (Patient understands anesthesia not responsible for dental damage.)  intravenous induction     Anesthetic plan, risks, benefits, and alternatives have been provided, discussed and informed consent has been obtained with: patient.    Use of blood products discussed with patient .    Plan discussed with CRNA.        CODE STATUS:    Code Status (Patient has no pulse and is  not breathing): CPR (Attempt to Resuscitate)  Medical Interventions (Patient has pulse or is breathing): Full Support

## 2025-01-27 ENCOUNTER — APPOINTMENT (OUTPATIENT)
Dept: NUCLEAR MEDICINE | Facility: HOSPITAL | Age: 60
End: 2025-01-27
Payer: COMMERCIAL

## 2025-01-27 ENCOUNTER — ANESTHESIA EVENT (OUTPATIENT)
Dept: PERIOP | Facility: HOSPITAL | Age: 60
End: 2025-01-27
Payer: COMMERCIAL

## 2025-01-27 PROBLEM — Z98.890 STATUS POST INCISION AND DRAINAGE: Status: ACTIVE | Noted: 2025-01-26

## 2025-01-27 LAB
ANION GAP SERPL CALCULATED.3IONS-SCNC: 9.6 MMOL/L (ref 5–15)
BASOPHILS # BLD AUTO: 0.01 10*3/MM3 (ref 0–0.2)
BASOPHILS NFR BLD AUTO: 0.1 % (ref 0–1.5)
BH CV IMMEDIATE POST TECH DATA BLOOD PRESSURE: NORMAL MMHG
BH CV IMMEDIATE POST TECH DATA HEART RATE: 121 BPM
BH CV IMMEDIATE POST TECH DATA OXYGEN SATS: 96 %
BH CV REST NUCLEAR ISOTOPE DOSE: 8.8 MCI
BH CV SIX MINUTE RECOVERY TECH DATA BLOOD PRESSURE: NORMAL
BH CV SIX MINUTE RECOVERY TECH DATA HEART RATE: 114 BPM
BH CV SIX MINUTE RECOVERY TECH DATA OXYGEN SATURATION: 96 %
BH CV STRESS BP STAGE 1: NORMAL
BH CV STRESS COMMENTS STAGE 1: NORMAL
BH CV STRESS DOSE REGADENOSON STAGE 1: 0.4
BH CV STRESS DURATION MIN STAGE 1: 0
BH CV STRESS DURATION SEC STAGE 1: 10
BH CV STRESS HR STAGE 1: 121
BH CV STRESS NUCLEAR ISOTOPE DOSE: 32.2 MCI
BH CV STRESS O2 STAGE 1: 96
BH CV STRESS PROTOCOL 1: NORMAL
BH CV STRESS STAGE 1: 1
BH CV THREE MINUTE POST TECH DATA BLOOD PRESSURE: NORMAL MMHG
BH CV THREE MINUTE POST TECH DATA HEART RATE: 120 BPM
BH CV THREE MINUTE POST TECH DATA OXYGEN SATURATION: 97 %
BUN SERPL-MCNC: 90 MG/DL (ref 6–20)
BUN/CREAT SERPL: 40.5 (ref 7–25)
CALCIUM SPEC-SCNC: 7.9 MG/DL (ref 8.6–10.5)
CHLORIDE SERPL-SCNC: 102 MMOL/L (ref 98–107)
CO2 SERPL-SCNC: 29.4 MMOL/L (ref 22–29)
CREAT SERPL-MCNC: 2.22 MG/DL (ref 0.76–1.27)
DEPRECATED RDW RBC AUTO: 46.7 FL (ref 37–54)
EGFRCR SERPLBLD CKD-EPI 2021: 33.3 ML/MIN/1.73
EOSINOPHIL # BLD AUTO: 0 10*3/MM3 (ref 0–0.4)
EOSINOPHIL NFR BLD AUTO: 0 % (ref 0.3–6.2)
ERYTHROCYTE [DISTWIDTH] IN BLOOD BY AUTOMATED COUNT: 14.3 % (ref 12.3–15.4)
GLUCOSE BLDC GLUCOMTR-MCNC: 202 MG/DL (ref 70–99)
GLUCOSE BLDC GLUCOMTR-MCNC: 231 MG/DL (ref 70–99)
GLUCOSE BLDC GLUCOMTR-MCNC: 264 MG/DL (ref 70–99)
GLUCOSE BLDC GLUCOMTR-MCNC: 315 MG/DL (ref 70–99)
GLUCOSE SERPL-MCNC: 389 MG/DL (ref 65–99)
HCT VFR BLD AUTO: 24.9 % (ref 37.5–51)
HGB BLD-MCNC: 7.4 G/DL (ref 13–17.7)
IMM GRANULOCYTES # BLD AUTO: 0.38 10*3/MM3 (ref 0–0.05)
IMM GRANULOCYTES NFR BLD AUTO: 2.7 % (ref 0–0.5)
LYMPHOCYTES # BLD AUTO: 0.71 10*3/MM3 (ref 0.7–3.1)
LYMPHOCYTES NFR BLD AUTO: 5.1 % (ref 19.6–45.3)
MAXIMAL PREDICTED HEART RATE: 161 BPM
MCH RBC QN AUTO: 27 PG (ref 26.6–33)
MCHC RBC AUTO-ENTMCNC: 29.7 G/DL (ref 31.5–35.7)
MCV RBC AUTO: 90.9 FL (ref 79–97)
MONOCYTES # BLD AUTO: 1.1 10*3/MM3 (ref 0.1–0.9)
MONOCYTES NFR BLD AUTO: 7.8 % (ref 5–12)
NEUTROPHILS NFR BLD AUTO: 11.82 10*3/MM3 (ref 1.7–7)
NEUTROPHILS NFR BLD AUTO: 84.3 % (ref 42.7–76)
NRBC BLD AUTO-RTO: 0 /100 WBC (ref 0–0.2)
PERCENT MAX PREDICTED HR: 77.02 %
PLATELET # BLD AUTO: 383 10*3/MM3 (ref 140–450)
PMV BLD AUTO: 9 FL (ref 6–12)
POTASSIUM SERPL-SCNC: 4.7 MMOL/L (ref 3.5–5.2)
RBC # BLD AUTO: 2.74 10*6/MM3 (ref 4.14–5.8)
SODIUM SERPL-SCNC: 141 MMOL/L (ref 136–145)
SPECT HRT GATED+EF W RNC IV: 40 %
STRESS BASELINE BP: NORMAL MMHG
STRESS BASELINE HR: 117 BPM
STRESS O2 SAT REST: 96 %
STRESS PERCENT HR: 91 %
STRESS POST O2 SAT PEAK: 96 %
STRESS POST PEAK BP: NORMAL MMHG
STRESS POST PEAK HR: 124 BPM
STRESS TARGET HR: 137 BPM
WBC NRBC COR # BLD AUTO: 14.02 10*3/MM3 (ref 3.4–10.8)

## 2025-01-27 PROCEDURE — 25010000002 METHYLPREDNISOLONE PER 40 MG: Performed by: STUDENT IN AN ORGANIZED HEALTH CARE EDUCATION/TRAINING PROGRAM

## 2025-01-27 PROCEDURE — 85025 COMPLETE CBC W/AUTO DIFF WBC: CPT | Performed by: STUDENT IN AN ORGANIZED HEALTH CARE EDUCATION/TRAINING PROGRAM

## 2025-01-27 PROCEDURE — 63710000001 INSULIN LISPRO (HUMAN) PER 5 UNITS: Performed by: STUDENT IN AN ORGANIZED HEALTH CARE EDUCATION/TRAINING PROGRAM

## 2025-01-27 PROCEDURE — 25010000002 REGADENOSON 0.4 MG/5ML SOLUTION: Performed by: STUDENT IN AN ORGANIZED HEALTH CARE EDUCATION/TRAINING PROGRAM

## 2025-01-27 PROCEDURE — 25010000002 ENOXAPARIN PER 10 MG: Performed by: STUDENT IN AN ORGANIZED HEALTH CARE EDUCATION/TRAINING PROGRAM

## 2025-01-27 PROCEDURE — 80048 BASIC METABOLIC PNL TOTAL CA: CPT | Performed by: STUDENT IN AN ORGANIZED HEALTH CARE EDUCATION/TRAINING PROGRAM

## 2025-01-27 PROCEDURE — 63710000001 INSULIN GLARGINE PER 5 UNITS: Performed by: STUDENT IN AN ORGANIZED HEALTH CARE EDUCATION/TRAINING PROGRAM

## 2025-01-27 PROCEDURE — 93017 CV STRESS TEST TRACING ONLY: CPT

## 2025-01-27 PROCEDURE — A9502 TC99M TETROFOSMIN: HCPCS | Performed by: STUDENT IN AN ORGANIZED HEALTH CARE EDUCATION/TRAINING PROGRAM

## 2025-01-27 PROCEDURE — 25810000003 SODIUM CHLORIDE 0.9 % SOLUTION: Performed by: STUDENT IN AN ORGANIZED HEALTH CARE EDUCATION/TRAINING PROGRAM

## 2025-01-27 PROCEDURE — 25010000002 VANCOMYCIN 5 G RECONSTITUTED SOLUTION: Performed by: STUDENT IN AN ORGANIZED HEALTH CARE EDUCATION/TRAINING PROGRAM

## 2025-01-27 PROCEDURE — 99024 POSTOP FOLLOW-UP VISIT: CPT | Performed by: NURSE PRACTITIONER

## 2025-01-27 PROCEDURE — 94799 UNLISTED PULMONARY SVC/PX: CPT

## 2025-01-27 PROCEDURE — 99232 SBSQ HOSP IP/OBS MODERATE 35: CPT | Performed by: STUDENT IN AN ORGANIZED HEALTH CARE EDUCATION/TRAINING PROGRAM

## 2025-01-27 PROCEDURE — 34310000005 TECHNETIUM TETROFOSMIN KIT: Performed by: STUDENT IN AN ORGANIZED HEALTH CARE EDUCATION/TRAINING PROGRAM

## 2025-01-27 PROCEDURE — 94669 MECHANICAL CHEST WALL OSCILL: CPT

## 2025-01-27 PROCEDURE — 99233 SBSQ HOSP IP/OBS HIGH 50: CPT | Performed by: INTERNAL MEDICINE

## 2025-01-27 PROCEDURE — 82948 REAGENT STRIP/BLOOD GLUCOSE: CPT

## 2025-01-27 PROCEDURE — 82948 REAGENT STRIP/BLOOD GLUCOSE: CPT | Performed by: STUDENT IN AN ORGANIZED HEALTH CARE EDUCATION/TRAINING PROGRAM

## 2025-01-27 PROCEDURE — 78452 HT MUSCLE IMAGE SPECT MULT: CPT

## 2025-01-27 PROCEDURE — 25010000002 MEROPENEM PER 100 MG: Performed by: STUDENT IN AN ORGANIZED HEALTH CARE EDUCATION/TRAINING PROGRAM

## 2025-01-27 RX ORDER — INSULIN LISPRO 100 [IU]/ML
7 INJECTION, SOLUTION INTRAVENOUS; SUBCUTANEOUS
Status: DISCONTINUED | OUTPATIENT
Start: 2025-01-27 | End: 2025-02-04

## 2025-01-27 RX ORDER — METOCLOPRAMIDE HYDROCHLORIDE 5 MG/ML
10 INJECTION INTRAMUSCULAR; INTRAVENOUS ONCE
Status: COMPLETED | OUTPATIENT
Start: 2025-01-28 | End: 2025-01-28

## 2025-01-27 RX ORDER — VANCOMYCIN/0.9 % SOD CHLORIDE 1.5G/250ML
1500 PLASTIC BAG, INJECTION (ML) INTRAVENOUS ONCE
Status: COMPLETED | OUTPATIENT
Start: 2025-01-27 | End: 2025-01-27

## 2025-01-27 RX ORDER — REGADENOSON 0.08 MG/ML
0.4 INJECTION, SOLUTION INTRAVENOUS
Status: COMPLETED | OUTPATIENT
Start: 2025-01-27 | End: 2025-01-27

## 2025-01-27 RX ADMIN — FINASTERIDE 5 MG: 5 TABLET, FILM COATED ORAL at 08:11

## 2025-01-27 RX ADMIN — ASPIRIN 81 MG: 81 TABLET, COATED ORAL at 06:35

## 2025-01-27 RX ADMIN — INSULIN GLARGINE 20 UNITS: 100 INJECTION, SOLUTION SUBCUTANEOUS at 20:52

## 2025-01-27 RX ADMIN — INSULIN LISPRO 7 UNITS: 100 INJECTION, SOLUTION INTRAVENOUS; SUBCUTANEOUS at 17:32

## 2025-01-27 RX ADMIN — CARVEDILOL 25 MG: 25 TABLET, FILM COATED ORAL at 08:10

## 2025-01-27 RX ADMIN — ARFORMOTEROL TARTRATE 15 MCG: 15 SOLUTION RESPIRATORY (INHALATION) at 20:22

## 2025-01-27 RX ADMIN — GUAIFENESIN 600 MG: 600 TABLET ORAL at 20:52

## 2025-01-27 RX ADMIN — WATER 40 MG: 1 INJECTION INTRAMUSCULAR; INTRAVENOUS; SUBCUTANEOUS at 14:23

## 2025-01-27 RX ADMIN — Medication 10 ML: at 08:11

## 2025-01-27 RX ADMIN — ENOXAPARIN SODIUM 40 MG: 100 INJECTION SUBCUTANEOUS at 20:51

## 2025-01-27 RX ADMIN — Medication 10 ML: at 21:17

## 2025-01-27 RX ADMIN — TETROFOSMIN 1 DOSE: 1.38 INJECTION, POWDER, LYOPHILIZED, FOR SOLUTION INTRAVENOUS at 11:46

## 2025-01-27 RX ADMIN — INSULIN LISPRO 3 UNITS: 100 INJECTION, SOLUTION INTRAVENOUS; SUBCUTANEOUS at 14:24

## 2025-01-27 RX ADMIN — ATORVASTATIN CALCIUM 20 MG: 10 TABLET, FILM COATED ORAL at 08:11

## 2025-01-27 RX ADMIN — BUMETANIDE 2 MG: 1 TABLET ORAL at 08:11

## 2025-01-27 RX ADMIN — BUDESONIDE 0.5 MG: 0.5 INHALANT RESPIRATORY (INHALATION) at 20:22

## 2025-01-27 RX ADMIN — BUSPIRONE HYDROCHLORIDE 15 MG: 5 TABLET ORAL at 08:11

## 2025-01-27 RX ADMIN — MONTELUKAST 10 MG: 10 TABLET, FILM COATED ORAL at 21:17

## 2025-01-27 RX ADMIN — TAMSULOSIN HYDROCHLORIDE 0.4 MG: 0.4 CAPSULE ORAL at 08:11

## 2025-01-27 RX ADMIN — VANCOMYCIN HYDROCHLORIDE 1500 MG: 5 INJECTION, POWDER, LYOPHILIZED, FOR SOLUTION INTRAVENOUS at 09:10

## 2025-01-27 RX ADMIN — INSULIN LISPRO 4 UNITS: 100 INJECTION, SOLUTION INTRAVENOUS; SUBCUTANEOUS at 20:51

## 2025-01-27 RX ADMIN — WATER 40 MG: 1 INJECTION INTRAMUSCULAR; INTRAVENOUS; SUBCUTANEOUS at 02:23

## 2025-01-27 RX ADMIN — INSULIN LISPRO 3 UNITS: 100 INJECTION, SOLUTION INTRAVENOUS; SUBCUTANEOUS at 17:32

## 2025-01-27 RX ADMIN — TRAZODONE HYDROCHLORIDE 150 MG: 100 TABLET ORAL at 21:17

## 2025-01-27 RX ADMIN — REGADENOSON 0.4 MG: 0.08 INJECTION, SOLUTION INTRAVENOUS at 11:46

## 2025-01-27 RX ADMIN — TOBRAMYCIN 300 MG: 300 SOLUTION RESPIRATORY (INHALATION) at 20:22

## 2025-01-27 RX ADMIN — GUAIFENESIN 600 MG: 600 TABLET ORAL at 08:10

## 2025-01-27 RX ADMIN — TETROFOSMIN 1 DOSE: 1.38 INJECTION, POWDER, LYOPHILIZED, FOR SOLUTION INTRAVENOUS at 10:12

## 2025-01-27 RX ADMIN — INSULIN LISPRO 7 UNITS: 100 INJECTION, SOLUTION INTRAVENOUS; SUBCUTANEOUS at 14:22

## 2025-01-27 RX ADMIN — FERROUS SULFATE TAB 325 MG (65 MG ELEMENTAL FE) 325 MG: 325 (65 FE) TAB at 08:11

## 2025-01-27 RX ADMIN — BUMETANIDE 2 MG: 1 TABLET ORAL at 20:52

## 2025-01-27 RX ADMIN — BUSPIRONE HYDROCHLORIDE 15 MG: 5 TABLET ORAL at 20:52

## 2025-01-27 RX ADMIN — INSULIN LISPRO 5 UNITS: 100 INJECTION, SOLUTION INTRAVENOUS; SUBCUTANEOUS at 08:12

## 2025-01-27 RX ADMIN — NIFEDIPINE 30 MG: 30 TABLET, FILM COATED, EXTENDED RELEASE ORAL at 07:24

## 2025-01-27 RX ADMIN — INSULIN LISPRO 7 UNITS: 100 INJECTION, SOLUTION INTRAVENOUS; SUBCUTANEOUS at 08:10

## 2025-01-27 RX ADMIN — CARVEDILOL 25 MG: 25 TABLET, FILM COATED ORAL at 21:17

## 2025-01-27 RX ADMIN — WATER 40 MG: 1 INJECTION INTRAMUSCULAR; INTRAVENOUS; SUBCUTANEOUS at 21:17

## 2025-01-27 RX ADMIN — MEROPENEM 1000 MG: 1 INJECTION INTRAVENOUS at 14:22

## 2025-01-27 RX ADMIN — Medication 10 ML: at 20:52

## 2025-01-27 RX ADMIN — DULOXETINE HYDROCHLORIDE 60 MG: 30 CAPSULE, DELAYED RELEASE ORAL at 08:11

## 2025-01-27 RX ADMIN — FAMOTIDINE 40 MG: 20 TABLET ORAL at 06:35

## 2025-01-27 NOTE — PROGRESS NOTES
[facility]   Hospitalist Progress Note  Date: 2025  Patient Name: Preston Wallis  : 1965  MRN: [id number]  Date of admission: 2025  Room/Bed: 360/1      Subjective   Subjective     Chief Complaint:   Chief Complaint   Patient presents with    Shortness of Breath       Summary:   59 y.o. male past medical history of COPD, hypertension, CHF, history of MDRO presents to the ED due to shortness of breath.  Patient was discharged on  for pneumonia.  Patient was discharged with IV ertapenem for his MDRO.  Patient states since being discharged his symptoms did not improve and continued to worsen.  At that time patient was discharged with home oxygen of 2 L and currently on 5 L.  Denies fever, chills, chest pain, abdominal pain, nausea, vomiting, diarrhea, headache or dysuria.  In the ED, patient's vitals showed temperature 97.6, heart rate 90 and blood pressure 155/73.  Patient's labs show troponin 94 and repeat 92, BNP 1100, sodium 135, creatinine 2.1, glucose 252, AST 49 ALT 42, white blood cell 24.2 and hemoglobin 9.0.  UA shows white blood cells with leukocytes and nitrites.  Chest x-ray shows chronic bilateral lung infiltrates.  Patient admitted to floors for further management.     Interval Followup:   Patient remains on 2 L nasal cannula, states that his symptoms are necessarily worse but just have not improved.  CT was performed to arrival of the chest which demonstrates new spiculated left lower lobe nodule, bronchiectasis throughout both lungs, micronodule or peribronchial densities suggestive of atypical infection, right paratracheal lymph node and enlarging pericardial effusion.  Pulmonology and cardiology services consulted.  TTE demonstrates very small pericardial effusion only, estimated EF 49%, with some grade 1 diastolic dysfunction left ventricle.  Wound care noted necrotic tissue perianal region.  CT abdomen pelvis was performed, 4.5 cm x 1.8 cm x 3.5 cm posterior anal  abscess noted.  General surgery consulted, patient underwent incision and drainage 1/26.    01/27/25  Patient doing well postop incision and drainage.    Vital signs reviewed, afebrile, saturating well on home 4 L nasal cannula    Labs reviewed, hyperglycemia uncontrolled.  Leukocytosis improving.  Renal function stable.  Imaging reviewed.  Abscess cultures with rare gram-positive cocci.  Sputum culture with moderate gram-negative bacilli.    Objective   Objective     Vitals:   Temp:  [97.3 °F (36.3 °C)-98.1 °F (36.7 °C)] 97.7 °F (36.5 °C)  Heart Rate:  [] 106  Resp:  [12-18] 18  BP: (125-149)/(45-71) 145/71  Flow (L/min) (Oxygen Therapy):  [4-10] 4    Physical Exam   General: Awake, alert, in no acute distress  HENT: Atraumatic, normocephalic. Nasal cannula in place 4 L minute oxygen flow rate  Eyes: pupils equal, round, without scleral icterus  Cardiovascular: Regular rate and rhythm  Pulmonary: No respiratory distress; no conversational dyspnea.  Gastrointestinal: Soft nontender nondistended  Musculoskeletal: No gross deformities      Result Review    Result Review:  I have personally reviewed these results:  [x]  Laboratory      Lab 01/27/25  0549 01/25/25  0349 01/24/25  0534 01/23/25  1735   WBC 14.02* 16.29* 16.89*  --    HEMOGLOBIN 7.4* 7.9* 8.6*  --    HEMATOCRIT 24.9* 26.2* 29.3*  --    PLATELETS 383 423 405  --    NEUTROS ABS 11.82* 14.57* 15.43*  --    IMMATURE GRANS (ABS) 0.38* 0.16* 0.18*  --    LYMPHS ABS 0.71 0.74 0.93  --    MONOS ABS 1.10* 0.81 0.33  --    EOS ABS 0.00 0.00 0.00  --    MCV 90.9 90.3 92.4  --    SED RATE  --   --  102*  --    CRP  --   --  7.03*  --    LACTATE  --   --   --  0.9         Lab 01/27/25  0549 01/25/25  0349 01/24/25  0534   SODIUM 141 140 136   POTASSIUM 4.7 4.2 4.8   CHLORIDE 102 101 99   CO2 29.4* 28.8 25.8   ANION GAP 9.6 10.2 11.2   BUN 90* 63* 51*   CREATININE 2.22* 2.11* 2.14*   EGFR 33.3* 35.4* 34.8*   GLUCOSE 389* 363* 301*   CALCIUM 7.9* 7.9* 8.0*          Lab 01/23/25  1614   TOTAL PROTEIN 7.9   ALBUMIN 3.1*   GLOBULIN 4.8   ALT (SGPT) 42*   AST (SGOT) 49*   BILIRUBIN 0.2   ALK PHOS 200*         Lab 01/24/25  0534 01/23/25  1735 01/23/25  1614   PROBNP  --   --  1,193.0*   HSTROP T 77* 92* 94*         Lab 01/25/25  0349   CHOLESTEROL 116   LDL CHOL 50   HDL CHOL 54   TRIGLYCERIDES 49               Lab 01/23/25 2036   PH, ARTERIAL 7.315*   PCO2, ARTERIAL 54.1*   PO2 ART 94.8   O2 SATURATION ART 96.5   FIO2 32   HCO3 ART 27.5*   BASE EXCESS ART 0.8     Brief Urine Lab Results  (Last result in the past 365 days)        Color   Clarity   Blood   Leuk Est   Nitrite   Protein   CREAT   Urine HCG        01/23/25 1912 Yellow   Turbid   Large (3+)   Moderate (2+)   Negative   >=300 mg/dL (3+)                 [x]  Microbiology   Microbiology Results (last 10 days)       Procedure Component Value - Date/Time    Wound Culture - Surgical Site, Perirectal Abscess [[id number]] Collected: 01/26/25 1629    Lab Status: Preliminary result Specimen: Surgical Site from Perirectal Abscess Updated: 01/26/25 1858     Gram Stain Few (2+) WBCs seen      Rare (1+) Gram positive cocci in pairs and chains    Respiratory Culture - Sputum, Cough [[id number]]  (Abnormal) Collected: 01/24/25 1618    Lab Status: Preliminary result Specimen: Sputum from Cough Updated: 01/27/25 0858     Respiratory Culture Moderate growth (3+) Gram Negative Bacilli      Scant growth (1+) Normal Respiratory Nola     Gram Stain Few (2+) WBCs seen      Rare (1+) Gram positive cocci      Rare (1+) Gram negative bacilli    Narrative:      ID/MICs to follow      AFB Culture - Sputum, Cough [[id number]] Collected: 01/24/25 1618    Lab Status: Preliminary result Specimen: Sputum from Cough Updated: 01/25/25 1334     AFB Stain No acid fast bacilli seen on direct smear    S. Pneumo Ag Urine or CSF - Urine, Indwelling Urethral Catheter [[id number]]  (Normal) Collected: 01/23/25 1912    Lab Status: Final result Specimen: Urine  from Indwelling Urethral Catheter Updated: 01/24/25 1646     Strep Pneumo Ag Negative    Legionella Antigen, Urine - Urine, Indwelling Urethral Catheter [[id number]]  (Normal) Collected: 01/23/25 1912    Lab Status: Final result Specimen: Urine from Indwelling Urethral Catheter Updated: 01/24/25 1646     LEGIONELLA ANTIGEN, URINE Negative    Blood Culture - Blood, Arm, Right [[id number]]  (Normal) Collected: 01/23/25 1748    Lab Status: Preliminary result Specimen: Blood from Arm, Right Updated: 01/26/25 1800     Blood Culture No growth at 3 days    Narrative:      Less than seven (7) mL's of blood was collected.  Insufficient quantity may yield false negative results.    Blood Culture - Blood, Hand, Left [[id number]]  (Normal) Collected: 01/23/25 1735    Lab Status: Preliminary result Specimen: Blood from Hand, Left Updated: 01/26/25 1745     Blood Culture No growth at 3 days    Narrative:      Less than seven (7) mL's of blood was collected.  Insufficient quantity may yield false negative results.    COVID-19, FLU A/B, RSV PCR 1 HR TAT - Swab, Nasopharynx [[id number]]  (Normal) Collected: 01/23/25 1723    Lab Status: Final result Specimen: Swab from Nasopharynx Updated: 01/23/25 1806     COVID19 Not Detected     Influenza A PCR Not Detected     Influenza B PCR Not Detected     RSV, PCR Not Detected    Narrative:      Fact sheet for providers: https://www.fda.gov/media/475053/download    Fact sheet for patients: https://www.fda.gov/media/869226/download    Test performed by PCR.          [x]  Radiology  CT Abdomen Pelvis Without Contrast    Result Date: 1/25/2025  Impression: 1.There is a posterior perianal abscess as detailed above. No intrapelvic extension. 2.Cardiomegaly with relatively small pericardial effusion. There is bibasilar atelectasis with trace pleural effusions. 3.Other incidental nonemergent findings as detailed above. Electronically Signed: Rob Milner MD  1/25/2025 10:55 AM EST  Workstation ID:  [id number]    CT Chest Without Contrast Diagnostic    Result Date: 1/24/2025  Impression: 1.There is a new somewhat spiculated nodular focus in the superior left lower lobe image #66 of series. The rapid interval development suggests an infectious or inflammatory process. 3-month follow-up CT recommended per Fleischner guidelines. 2.Stable appearance of verrucous and cystic bronchiectasis involving all lobes of the lungs, but greatest in the right upper lobe and lower lobes. There are again adjacent micronodular densities in a peribronchial distribution suggesting an infectious or  inflammatory process. Nontuberculous mycobacterial infection or other atypical agent would be a top considerations. 3.Small bilateral pleural effusions, slightly increased from prior. There is consolidation in the lung bases bilaterally which may be due to atelectasis or additional infiltrates. 4.Increasing pericardial effusion, now measuring up to 2.6 cm posteriorly on the right. 5.Enlarged right paratracheal lymph node measuring up to 1.4 cm, likely reactive. Hilar adenopathy is also suspected, but not well characterized due to noncontrast technique. 6.Additional findings as given above. Electronically Signed: Deshawn Soto MD  1/24/2025 9:43 AM EST  Workstation ID: [id number]    XR Chest 1 View    Result Date: 1/23/2025  Impression: Patchy chronic bilateral lung infiltrates. No acute infiltrate. Electronically Signed: Amado Salmeron MD  1/23/2025 5:11 PM EST  Workstation ID: [id number]   []  EKG/Telemetry   []  Cardiology/Vascular   []  Pathology  []  Old records  []  Other:    Assessment & Plan   Assessment / Plan     Assessment  Acute hypoxemic respiratory failure  History of pneumonia with MDRO Pseudomonas  Complicated UTI  Type 2 diabetes on insulin  History of COPD  CKD  Atrial fibrillation on Eliquis  Elevated troponins  Heart failure with preserved ejection fraction  History of BPH  Chronic left heel ulcer  Obstructive sleep apnea      Plan    -Pulmonology following, possible bronchoscopy planned if little improvement  -Cardiology following, recommendations reviewed, follow-up nuclear stress test today  -General Surgeon following, status post 1/27 I/D.  Will need fecal diversion later this week.  -Hold Eliquis for A-fib in setting procedures  -Continue Lovenox for DVT prophylaxis  -Continue monitoring in [facility]  -Continue telemetry, vitals monitoring  -Continue chronic indwelling Doyle catheter  -A.m. CBC BMP  -Follow-up blood cultures   -Follow-up sputum culture  -vancomycin until abscess cultures finalized, de-escalate as needed  -Continue IV meropenem, home inhaled Tobramycin  -Continue IV Solu-Medrol   -Continue home long-acting inhaler, DuoNebs as needed      VTE Prophylaxis:  Pharmacologic VTE prophylaxis orders are present.        CODE STATUS:   Code Status (Patient has no pulse and is not breathing): CPR (Attempt to Resuscitate)  Medical Interventions (Patient has pulse or is breathing): Full Support      Electronically signed by Matty Alejandra MD, 1/27/2025, 10:19 EST.

## 2025-01-27 NOTE — PROGRESS NOTES
[facility]   Progress Note    Patient Name: Preston Wallis  : 1965  MRN: [id number]  Primary Care Physician: Kimmy Riley MD  Date of admission: 2025    Subjective   Subjective     HPI:  Patient without chest pain or shortness of breath at present time, had a nuclear study which showed an apical defect may represent apical thinning versus apical not significant ischemia, patient is on atrial fibrillation, he needs to be anticoagulated if he is not going to have surgery or if there is no contraindication, I saw that the Eliquis was held.  Patient has significant anemia needs to be evaluated regarding the etiology for possible treatment with IV iron or B12 and folic acid.    Review of Systems  Review of Systems   Constitutional: Negative.    HENT: Negative.     Eyes: Negative.    Respiratory: Negative.     Cardiovascular: Negative.    Gastrointestinal: Negative.    Genitourinary: Negative.    Musculoskeletal:         Skin ulcers   Neurological: Negative.    Psychiatric/Behavioral: Negative.         Objective   Objective     Vitals:  Temp:  [97.3 °F (36.3 °C)-98.1 °F (36.7 °C)] 98.1 °F (36.7 °C)  Heart Rate:  [] 117  Resp:  [16-18] 18  BP: (124-145)/(45-71) 124/64  Flow (L/min) (Oxygen Therapy):  [4] 4    Physical Exam:  Physical Exam    Result Review    Result Review:  I have personally reviewed the results from the time of this admission to 25 6:08 PM EST and agree with these findings:  [x]  Laboratory  []  Microbiology  []  Radiology  [x]  EKG/Telemetry   []  Cardiology/Vascular   []  Pathology  []  Old records  []  Other:    Most notable findings include: Patient with history of atrial fibrillation, peripheral vascular disease,, nuclear study showed possible apical infarct no significant ischemia patient also has history of pneumonia, incision and drainage of posterior perianal abscess, excision and debridement through the skin subcutaneous tissue of the posterior perianal  area, pseudomonal pneumonia, bronchiectais, COPD.  Anemia.  Patient needs to be back on Eliquis if he does not have bleeding or surgeries are planned.  Patient anemia needs to be evaluated and treated.    Assessment & Plan   Assessment / Plan     Active Hospital Problems:  Active Hospital Problems    Diagnosis    • **PNA (pneumonia)    • 1. Incision and drainage of posterior perianal abscess 2. Sharp excisional debridement through skin and subcutaneous tissue in the posterior perianal area, 9 x 6 x 1 cm    • Perianal abscess    • Pneumonia due to Pseudomonas species    • Bronchiectasis without complication    • COPD exacerbation        Plan:   Patient is currently followed by pulmonology, internal medicine and surgery, his atrial fibrillation probably needs to be treated with Eliquis if there is no contraindications, patient has significant anemia that need to be evaluated and treated.    DVT prophylaxis: As per internal medicine    CODE STATUS:    Code Status and Medical Interventions: CPR (Attempt to Resuscitate); Full Support   Ordered at: 01/23/25 1959     Code Status (Patient has no pulse and is not breathing):    CPR (Attempt to Resuscitate)     Medical Interventions (Patient has pulse or is breathing):    Full Support       Disposition:  I expect patient to be discharged when patient gets better.    Electronically signed by Alec Padron MD, 01/27/25, 6:08 PM EST.

## 2025-01-27 NOTE — SIGNIFICANT NOTE
01/27/25 1000   Physical Therapy Time and Intention   Session Not Performed patient unavailable for evaluation  (in stress test)

## 2025-01-27 NOTE — PROGRESS NOTES
Pulmonary / Critical Care Progress Note      Patient Name: Preston Wallis  : 1965  MRN: [id number]  Attending:  Matty Alejandra MD  Date of admission: 2025    Subjective   Subjective   Follow-up for acute on chronic hypoxic respiratory failure    Over past 24 hours:  No acute events overnight  On 4 L nasal cannula  Surgery team in room assessing for perianal abscess  Respiratory culture no growth    Review of Systems  General: Denied complaints  Cardiovascular:  Denied complaints  Respiratory: + Dyspnea on exertion; denied complaints  Gastrointestinal: Denied complaints        Objective   Objective     Vitals:   Temp:  [97.3 °F (36.3 °C)-97.7 °F (36.5 °C)] 97.7 °F (36.5 °C)  Heart Rate:  [] 106  Resp:  [12-18] 18  BP: (125-149)/(45-71) 145/71  Flow (L/min) (Oxygen Therapy):  [4-10] 4    Physical Exam   Chronically ill-appearing male  Resting comfortably  Scattered crackles      Result Review    Result Review:  I have personally reviewed the results from the time of this admission to 2025 11:54 EST and agree with these findings:  []  Laboratory  []  Microbiology  []  Radiology  []  EKG/Telemetry   []  Cardiology/Vascular   []  Pathology  []  Old records  []  Other:  Most notable findings include:   -     Assessment & Plan   Assessment / Plan     Active Hospital Problems:  Active Hospital Problems    Diagnosis     **PNA (pneumonia)     1. Incision and drainage of posterior perianal abscess 2. Sharp excisional debridement through skin and subcutaneous tissue in the posterior perianal area, 9 x 6 x 1 cm     Perianal abscess     Pneumonia due to Pseudomonas species     Bronchiectasis without complication     COPD exacerbation          Impression:  Cystic bronchiectasis with acute exacerbation  History of MDR Pseudomonas    Plan:  Plan for bronchoscopy tomorrow 2025  Does have CT scan showing extensive bronchiectasis  With evidence of consolidation  Plan for bronchoscopy tomorrow  1/28/2025  Risks versus benefits of bronchoscopy explained to the patient including death, respiratory failure requiring intubation mechanical ventilation, pneumothorax requiring chest tube placement, bleeding.  Patient voices understanding of the risks of the procedure and wishes to proceed.  Continue meropenem and interim  Hopefully we can adjust antibiotics based upon BAL  Continue Solu-Medrol  Okay from a standpoint to transition to p.o. prednisone  N.p.o. after midnight    VTE Prophylaxis:  Pharmacologic VTE prophylaxis orders are present.        CODE STATUS:   Code Status (Patient has no pulse and is not breathing): CPR (Attempt to Resuscitate)  Medical Interventions (Patient has pulse or is breathing): Full Support      Labs, images, and medications personally reviewed.  Discussed with patient    Electronically signed by Walter Nicole DO, 01/27/25, 4:07 PM EST.

## 2025-01-27 NOTE — PLAN OF CARE
Goal Outcome Evaluation:              Outcome Evaluation: A/o, Vitals stable. No c/o pain, only soreness in anal/buttock area and provided frequent position changes and turns. Patient slept well. PAC accessed.

## 2025-01-27 NOTE — PROGRESS NOTES
[facility] Clinical Pharmacy Services: Vancomycin Pharmacokinetic Initial Consult Note    Preston Wallis is a 59 y.o. male who is on day 1 of pharmacy to dose vancomycin for Skin and Soft Tissue.    Consult Information  Consulting Provider: LUZ MARINA Alejandra  Planned Duration of Therapy: 7 days  Was Patient Receiving Prior to Admission/Consult?: No  Loading Dose Ordered or Given: 1500 mg on 1/27 at 0930  PK/PD Target: Dose by Levels  Relevant ID History: Perianal abscess with I&D 1/26   Other Antimicrobials: Meropenem    Imaging Reviewed?: Yes    Microbiology Data  MRSA PCR performed: No; Result: Not ordered due to excluded indication or presence of suspected abscess  Culture/Source:   Microbiology Results (last 10 days)       Procedure Component Value - Date/Time    Wound Culture - Surgical Site, Perirectal Abscess [[id number]] Collected: 01/26/25 1629    Lab Status: Preliminary result Specimen: Surgical Site from Perirectal Abscess Updated: 01/26/25 1858     Gram Stain Few (2+) WBCs seen      Rare (1+) Gram positive cocci in pairs and chains    Respiratory Culture - Sputum, Cough [[id number]]  (Abnormal) Collected: 01/24/25 1618    Lab Status: Preliminary result Specimen: Sputum from Cough Updated: 01/26/25 1355     Respiratory Culture Moderate growth (3+) Gram Negative Bacilli      Scant growth (1+) Normal Respiratory Nola     Gram Stain Few (2+) WBCs seen      Rare (1+) Gram positive cocci      Rare (1+) Gram negative bacilli    AFB Culture - Sputum, Cough [[id number]] Collected: 01/24/25 1618    Lab Status: Preliminary result Specimen: Sputum from Cough Updated: 01/25/25 1334     AFB Stain No acid fast bacilli seen on direct smear    S. Pneumo Ag Urine or CSF - Urine, Indwelling Urethral Catheter [[id number]]  (Normal) Collected: 01/23/25 1912    Lab Status: Final result Specimen: Urine from Indwelling Urethral Catheter Updated: 01/24/25 1646     Strep Pneumo Ag Negative    Legionella Antigen, Urine - Urine, Indwelling  "Urethral Catheter [[id number]]  (Normal) Collected: 25 1912    Lab Status: Final result Specimen: Urine from Indwelling Urethral Catheter Updated: 25 1646     LEGIONELLA ANTIGEN, URINE Negative    Blood Culture - Blood, Arm, Right [[id number]]  (Normal) Collected: 25 1748    Lab Status: Preliminary result Specimen: Blood from Arm, Right Updated: 25 1800     Blood Culture No growth at 3 days    Narrative:      Less than seven (7) mL's of blood was collected.  Insufficient quantity may yield false negative results.    Blood Culture - Blood, Hand, Left [[id number]]  (Normal) Collected: 25 1735    Lab Status: Preliminary result Specimen: Blood from Hand, Left Updated: 25 1745     Blood Culture No growth at 3 days    Narrative:      Less than seven (7) mL's of blood was collected.  Insufficient quantity may yield false negative results.    COVID-19, FLU A/B, RSV PCR 1 HR TAT - Swab, Nasopharynx [[id number]]  (Normal) Collected: 25 1723    Lab Status: Final result Specimen: Swab from Nasopharynx Updated: 25 1806     COVID19 Not Detected     Influenza A PCR Not Detected     Influenza B PCR Not Detected     RSV, PCR Not Detected    Narrative:      Fact sheet for providers: https://www.fda.gov/media/609059/download    Fact sheet for patients: https://www.fda.gov/media/744270/download    Test performed by PCR.              Vitals/Labs  Ht: 175.3 cm (69\"); Wt: 118 kg (260 lb 2.3 oz)  Temp (24hrs), Av.5 °F (36.4 °C), Min:97.3 °F (36.3 °C), Max:98.1 °F (36.7 °C)   Estimated Creatinine Clearance: 45.4 mL/min (A) (by C-G formula based on SCr of 2.22 mg/dL (H)).     Results from last 7 days   Lab Units 25  0549 25  0349 25  0534   CREATININE mg/dL 2.22* 2.11* 2.14*   WBC 10*3/mm3 14.02* 16.29* 16.89*     Assessment/Plan:    Vancomycin Dose: pulse dosing with 1500 mg IV once on  at 0930  Vanc Random ordered for  at 0600  Patient has order for Basic Metabolic " Panel    Pharmacy will follow patient's kidney function and will adjust doses and obtain levels as necessary. Thank you for involving pharmacy in this patient's care. Please contact pharmacy with any questions or concerns.                           Snehal Myers  Clinical Pharmacist

## 2025-01-27 NOTE — H&P (VIEW-ONLY)
"POST OP PROGRESS NOTE     Patient Name:  Preston Wallis  YOB: 1965  [id number]   LOS: 4 days   1 Day Post-Op            Subjective     Interval History:   VSS, afebrile, pain controlled, patient has decided  he would like a diverting colostomy    Review of Systems:    A complete review of systems was performed and all are negative except what is documented in the HPI.       Objective     Constitutional:  well nourished, no acute distress, appears stated age /71 (BP Location: Left arm, Patient Position: Lying)   Pulse 106   Temp 97.7 °F (36.5 °C) (Oral)   Resp 18   Ht 175.3 cm (69\")   Wt 118 kg (260 lb 2.3 oz)   SpO2 96%   BMI 38.42 kg/m²    Eyes:  anicteric sclerae, moist conjunctivae, no lid lag, PERRLA  ENMT:  oropharynx clear, moist mucous membranes  Neck:   full ROM, trachea midline  Cardiovascular: RRR, S1 and S2 present, no MRG, heart rate 106, no pedal edema  Respiratory: lungs CTA, respirations even and unlabored  GI:  Abdomen soft, nontender, nondistended     Skin:  warm and dry, normal turgor, no rashes, perirectal incision with dressing intact  Psychiatric:  alert and oriented x 4, intact judgment and insight, cooperative          Results Review:       I reviewed the patient's new clinical results including  CBC, BMP.     WBC   Date Value Ref Range Status   01/27/2025 14.02 (H) 3.40 - 10.80 10*3/mm3 Final     RBC   Date Value Ref Range Status   01/27/2025 2.74 (L) 4.14 - 5.80 10*6/mm3 Final     Hemoglobin   Date Value Ref Range Status   01/27/2025 7.4 (L) 13.0 - 17.7 g/dL Final     Hematocrit   Date Value Ref Range Status   01/27/2025 24.9 (L) 37.5 - 51.0 % Final     MCV   Date Value Ref Range Status   01/27/2025 90.9 79.0 - 97.0 fL Final     MCH   Date Value Ref Range Status   01/27/2025 27.0 26.6 - 33.0 pg Final     MCHC   Date Value Ref Range Status   01/27/2025 29.7 (L) 31.5 - 35.7 g/dL Final     RDW   Date Value Ref Range Status   01/27/2025 14.3 12.3 - 15.4 % Final " "    RDW-SD   Date Value Ref Range Status   01/27/2025 46.7 37.0 - 54.0 fl Final     MPV   Date Value Ref Range Status   01/27/2025 9.0 6.0 - 12.0 fL Final     Platelets   Date Value Ref Range Status   01/27/2025 383 140 - 450 10*3/mm3 Final     Neutrophil %   Date Value Ref Range Status   01/27/2025 84.3 (H) 42.7 - 76.0 % Final     Lymphocyte %   Date Value Ref Range Status   01/27/2025 5.1 (L) 19.6 - 45.3 % Final     Monocyte %   Date Value Ref Range Status   01/27/2025 7.8 5.0 - 12.0 % Final     Eosinophil %   Date Value Ref Range Status   01/27/2025 0.0 (L) 0.3 - 6.2 % Final     Basophil %   Date Value Ref Range Status   01/27/2025 0.1 0.0 - 1.5 % Final     Immature Grans %   Date Value Ref Range Status   01/27/2025 2.7 (H) 0.0 - 0.5 % Final     Neutrophils, Absolute   Date Value Ref Range Status   01/27/2025 11.82 (H) 1.70 - 7.00 10*3/mm3 Final     Lymphocytes, Absolute   Date Value Ref Range Status   01/27/2025 0.71 0.70 - 3.10 10*3/mm3 Final     Monocytes, Absolute   Date Value Ref Range Status   01/27/2025 1.10 (H) 0.10 - 0.90 10*3/mm3 Final     Eosinophils, Absolute   Date Value Ref Range Status   01/27/2025 0.00 0.00 - 0.40 10*3/mm3 Final     Basophils, Absolute   Date Value Ref Range Status   01/27/2025 0.01 0.00 - 0.20 10*3/mm3 Final     Immature Grans, Absolute   Date Value Ref Range Status   01/27/2025 0.38 (H) 0.00 - 0.05 10*3/mm3 Final     nRBC   Date Value Ref Range Status   01/27/2025 0.0 0.0 - 0.2 /100 WBC Final         Basic Metabolic Panel    Sodium Sodium   Date Value Ref Range Status   01/27/2025 141 136 - 145 mmol/L Final   01/25/2025 140 136 - 145 mmol/L Final      Potassium Potassium   Date Value Ref Range Status   01/27/2025 4.7 3.5 - 5.2 mmol/L Final   01/25/2025 4.2 3.5 - 5.2 mmol/L Final      Chloride Chloride   Date Value Ref Range Status   01/27/2025 102 98 - 107 mmol/L Final   01/25/2025 101 98 - 107 mmol/L Final      Bicarbonate No results found for: \"PLASMABICARB\"   BUN BUN   Date " "Value Ref Range Status   01/27/2025 90 (H) 6 - 20 mg/dL Final   01/25/2025 63 (H) 6 - 20 mg/dL Final      Creatinine Creatinine   Date Value Ref Range Status   01/27/2025 2.22 (H) 0.76 - 1.27 mg/dL Final   01/25/2025 2.11 (H) 0.76 - 1.27 mg/dL Final      Calcium Calcium   Date Value Ref Range Status   01/27/2025 7.9 (L) 8.6 - 10.5 mg/dL Final   01/25/2025 7.9 (L) 8.6 - 10.5 mg/dL Final      Glucose      No components found for: \"GLUCOSE.*\"       Lab Results   Component Value Date    GLUCOSE 389 (H) 01/27/2025    BUN 90 (H) 01/27/2025    CREATININE 2.22 (H) 01/27/2025     01/27/2025    K 4.7 01/27/2025     01/27/2025    CALCIUM 7.9 (L) 01/27/2025    PROTEINTOT 7.9 01/23/2025    ALBUMIN 3.1 (L) 01/23/2025    ALT 42 (H) 01/23/2025    AST 49 (H) 01/23/2025    ALKPHOS 200 (H) 01/23/2025    BILITOT 0.2 01/23/2025    GLOB 4.8 01/23/2025    AGRATIO 0.6 01/23/2025    BCR 40.5 (H) 01/27/2025    ANIONGAP 9.6 01/27/2025    EGFR 33.3 (L) 01/27/2025       IMAGING:        Medications:    Current Facility-Administered Medications:     [Held by provider] apixaban (ELIQUIS) tablet 5 mg, 5 mg, Oral, Q12H, Emerson Canada MD, 5 mg at 01/25/25 2141    arformoterol (BROVANA) nebulizer solution 15 mcg, 15 mcg, Nebulization, BID - RT, Emerson aCnada MD, 15 mcg at 01/26/25 2043    aspirin EC tablet 81 mg, 81 mg, Oral, QAM, Emerson Canada MD, 81 mg at 01/27/25 0635    atorvastatin (LIPITOR) tablet 20 mg, 20 mg, Oral, Daily, Emerson Canada MD, 20 mg at 01/27/25 0811    sennosides-docusate (PERICOLACE) 8.6-50 MG per tablet 2 tablet, 2 tablet, Oral, BID PRN **AND** polyethylene glycol (MIRALAX) packet 17 g, 17 g, Oral, Daily PRN **AND** bisacodyl (DULCOLAX) EC tablet 5 mg, 5 mg, Oral, Daily PRN **AND** bisacodyl (DULCOLAX) suppository 10 mg, 10 mg, Rectal, Daily PRN, Emerson Canada MD    budesonide (PULMICORT) nebulizer solution 0.5 mg, 0.5 mg, Nebulization, BID, Emerson Canada MD, 0.5 mg at 01/26/25 2043    bumetanide " (BUMEX) tablet 2 mg, 2 mg, Oral, BID, Emerson Canada MD, 2 mg at 01/27/25 0811    busPIRone (BUSPAR) tablet 15 mg, 15 mg, Oral, BID, Emerson Canada MD, 15 mg at 01/27/25 0811    carvedilol (COREG) tablet 25 mg, 25 mg, Oral, Q12H, Emerson Canada MD, 25 mg at 01/27/25 0810    collagenase ointment 1 Application, 1 Application, Topical, 2 times per day, Emerson Canada MD, 1 Application at 01/26/25 1100    dextrose (D50W) (25 g/50 mL) IV injection 25 g, 25 g, Intravenous, Q15 Min PRN, Emerson Canada MD    dextrose (GLUTOSE) oral gel 15 g, 15 g, Oral, Q15 Min PRN, Emerson Canada MD    DULoxetine (CYMBALTA) DR capsule 60 mg, 60 mg, Oral, Daily, Emerson Canada MD, 60 mg at 01/27/25 0811    Enoxaparin Sodium (LOVENOX) syringe 40 mg, 40 mg, Subcutaneous, Nightly, Emerson Canada MD, 40 mg at 01/26/25 1005    famotidine (PEPCID) tablet 40 mg, 40 mg, Oral, QAM, Emerson Canada MD, 40 mg at 01/27/25 0635    ferrous sulfate tablet 325 mg, 325 mg, Oral, Daily With Breakfast, Emerson Canada MD, 325 mg at 01/27/25 0811    finasteride (PROSCAR) tablet 5 mg, 5 mg, Oral, Daily, Emerson Canada MD, 5 mg at 01/27/25 0811    glucagon (GLUCAGEN) injection 1 mg, 1 mg, Intramuscular, Q15 Min PRN, Emerson Canada MD    guaiFENesin (MUCINEX) 12 hr tablet 600 mg, 600 mg, Oral, Q12H, Emerson Canada MD, 600 mg at 01/27/25 0810    heparin injection 500 Units, 5 mL, Intravenous, PRN, Emerson Canada MD    HYDROcodone-acetaminophen (NORCO) 5-325 MG per tablet 1 tablet, 1 tablet, Oral, Q6H PRN, Emerson Canada MD    HYDROmorphone (DILAUDID) injection 0.5 mg, 0.5 mg, Intravenous, Q4H PRN, Emerson Canada MD    insulin glargine (LANTUS, SEMGLEE) injection 20 Units, 20 Units, Subcutaneous, Nightly, Matty Alejandra MD, 20 Units at 01/26/25 2156    Insulin Lispro (humaLOG) injection 2-7 Units, 2-7 Units, Subcutaneous, 4x Daily AC & at Bedtime, Emerson Canada MD, 5 Units at 01/27/25 0812    Insulin Lispro (humaLOG) injection 7  Units, 7 Units, Subcutaneous, TID With Meals, Matty Alejandra MD, 7 Units at 01/27/25 0810    ipratropium-albuterol (DUO-NEB) nebulizer solution 3 mL, 3 mL, Nebulization, Q4H PRN, Emerson Canada MD, 3 mL at 01/26/25 1655    meropenem (MERREM) 1,000 mg in sodium chloride 0.9 % 100 mL IVPB-VTB, 1,000 mg, Intravenous, Q12H, Emerson Canada MD, 1,000 mg at 01/26/25 2354    methylPREDNISolone sodium succinate (SOLU-Medrol) 40 mg in sterile water (preservative free) 1 mL, 40 mg, Intravenous, Q8H, Emerson Canada MD, 40 mg at 01/27/25 0223    montelukast (SINGULAIR) tablet 10 mg, 10 mg, Oral, Nightly, Emerson Canada MD, 10 mg at 01/26/25 2155    NIFEdipine XL (PROCARDIA XL) 24 hr tablet 30 mg, 30 mg, Oral, QAM, Emerson Canada MD, 30 mg at 01/27/25 0724    nitroglycerin (NITROSTAT) SL tablet 0.4 mg, 0.4 mg, Sublingual, Q5 Min PRN, Emerson Canada MD    Pharmacy to dose vancomycin, , Not Applicable, Continuous PRN, Matty Alejandra MD    revefenacin (YUPELRI) nebulizer solution 175 mcg, 175 mcg, Nebulization, Daily - RT, Emerson Canada MD, 175 mcg at 01/26/25 0918    sodium chloride 0.9 % flush 10 mL, 10 mL, Intravenous, PRN, Emerson Canada MD, 10 mL at 01/24/25 1849    sodium chloride 0.9 % flush 10 mL, 10 mL, Intravenous, Q12H, Emerson Canada MD, 10 mL at 01/26/25 2157    sodium chloride 0.9 % flush 10 mL, 10 mL, Intravenous, PRN, Emerson Canada MD    sodium chloride 0.9 % flush 10 mL, 10 mL, Intravenous, Q12H, Emerson Canada MD, 10 mL at 01/27/25 0811    sodium chloride 0.9 % flush 10 mL, 10 mL, Intravenous, PRN, Emerson Canada MD    sodium chloride 0.9 % flush 20 mL, 20 mL, Intravenous, PRN, Emerson Canada MD    sodium chloride 0.9 % infusion 40 mL, 40 mL, Intravenous, PRN, Emerson Canada MD    sodium chloride 0.9 % infusion 40 mL, 40 mL, Intravenous, PRN, Emerson Canada MD    tamsulosin (FLOMAX) 24 hr capsule 0.4 mg, 0.4 mg, Oral, Daily, Emerson Canada MD, 0.4 mg at 01/27/25 0811     tobramycin PF (MOIZ) nebulizer solution 300 mg, 300 mg, Nebulization, BID - RT, Emerson Canada MD, 300 mg at 01/26/25 2043    traZODone (DESYREL) tablet 150 mg, 150 mg, Oral, Nightly, Emerson Canada MD, 150 mg at 01/26/25 2156    vancomycin IVPB 1500 mg in 0.9% NaCl (Premix) 500 mL, 1,500 mg, Intravenous, Once, Matty Alejandra MD, Last Rate: 333.3 mL/hr at 01/27/25 0910, 1,500 mg at 01/27/25 0910    Assessment & Plan       PNA (pneumonia)    Perianal abscess    1. Incision and drainage of posterior perianal abscess 2. Sharp excisional debridement through skin and subcutaneous tissue in the posterior perianal area, 9 x 6 x 1 cm     Cont localized wound care   Will plan for diverting colostomy later in the week          Electronically signed by CON Cruz, 01/27/25, 9:50 AM EST.

## 2025-01-27 NOTE — PLAN OF CARE
Goal Outcome Evaluation:   VSS throughout shift. Afib on the monitor. Went down for nuclear stress test. Still pending. NPO after midnight for bronch tomorrow. This nurse got a hold if MD Canada for post op wound care instructions and that will start tonight. MD Padron came by and requested this nurse to get a hold of hospitalist to tell them to restart eliquis after surgery and Bronch. MD notified. Plan for diverting colostomy later in the week. Will continue POC.

## 2025-01-28 ENCOUNTER — ANESTHESIA (OUTPATIENT)
Dept: PERIOP | Facility: HOSPITAL | Age: 60
End: 2025-01-28
Payer: COMMERCIAL

## 2025-01-28 PROBLEM — S31.809A WOUND OF GLUTEAL CLEFT: Status: ACTIVE | Noted: 2025-01-23

## 2025-01-28 LAB
ACB CMPLX DNA BAL NAA+NON-PRB-NCNCRNG: NOT DETECTED
ANION GAP SERPL CALCULATED.3IONS-SCNC: 9.6 MMOL/L (ref 5–15)
BACTERIA SPEC AEROBE CULT: NORMAL
BACTERIA SPEC AEROBE CULT: NORMAL
BLACTX-M ISLT/SPM QL: NOT DETECTED
BLAIMP ISLT/SPM QL: NOT DETECTED
BLAKPC ISLT/SPM QL: NOT DETECTED
BLAOXA-48-LIKE ISLT/SPM QL: ABNORMAL
BLAVIM ISLT/SPM QL: NOT DETECTED
BUN SERPL-MCNC: 100 MG/DL (ref 6–20)
BUN/CREAT SERPL: 39.4 (ref 7–25)
C PNEUM DNA NPH QL NAA+NON-PROBE: NOT DETECTED
CALCIUM SPEC-SCNC: 7.8 MG/DL (ref 8.6–10.5)
CHLORIDE SERPL-SCNC: 100 MMOL/L (ref 98–107)
CILIATED BAL QL: 1 %
CO2 SERPL-SCNC: 29.4 MMOL/L (ref 22–29)
CREAT SERPL-MCNC: 2.54 MG/DL (ref 0.76–1.27)
E CLOAC COMP DNA BAL NAA+NON-PRB-NCNCRNG: NOT DETECTED
E COLI DNA BAL NAA+NON-PRB-NCNCRNG: NOT DETECTED
EGFRCR SERPLBLD CKD-EPI 2021: 28.3 ML/MIN/1.73
FLUAV SUBTYP SPEC NAA+PROBE: NOT DETECTED
FLUBV RNA ISLT QL NAA+PROBE: NOT DETECTED
GLUCOSE BLDC GLUCOMTR-MCNC: 286 MG/DL (ref 70–99)
GLUCOSE BLDC GLUCOMTR-MCNC: 308 MG/DL (ref 70–99)
GLUCOSE BLDC GLUCOMTR-MCNC: 327 MG/DL (ref 70–99)
GLUCOSE BLDC GLUCOMTR-MCNC: 363 MG/DL (ref 70–99)
GLUCOSE BLDC GLUCOMTR-MCNC: 370 MG/DL (ref 70–99)
GLUCOSE SERPL-MCNC: 388 MG/DL (ref 65–99)
GP B STREP DNA BAL NAA+NON-PRB-NCNCRNG: NOT DETECTED
HADV DNA SPEC NAA+PROBE: NOT DETECTED
HAEM INFLU DNA BAL NAA+NON-PRB-NCNCRNG: NOT DETECTED
HCOV RNA LOWER RESP QL NAA+NON-PROBE: NOT DETECTED
HMPV RNA NPH QL NAA+NON-PROBE: NOT DETECTED
HPIV RNA LOWER RESP QL NAA+NON-PROBE: NOT DETECTED
K AEROGENES DNA BAL NAA+NON-PRB-NCNCRNG: NOT DETECTED
K OXYTOCA DNA BAL NAA+NON-PRB-NCNCRNG: NOT DETECTED
K PNEU GRP DNA BAL NAA+NON-PRB-NCNCRNG: NOT DETECTED
L PNEUMO DNA LOWER RESP QL NAA+NON-PROBE: NOT DETECTED
LYMPHOCYTES NFR FLD MANUAL: 6 %
M CATARRHALIS DNA BAL NAA+NON-PRB-NCNCRNG: NOT DETECTED
M PNEUMO IGG SER IA-ACNC: NOT DETECTED
MACROPHAGE FLUID %: 1 %
MECA+MECC ISLT/SPM QL: ABNORMAL
NDM GENE: NOT DETECTED
NEUTROPHILS NFR FLD MANUAL: 92 %
P AERUGINOSA DNA BAL NAA+NON-PRB-NCNCRNG: DETECTED
POTASSIUM SERPL-SCNC: 4.8 MMOL/L (ref 3.5–5.2)
PROTEUS SP DNA BAL NAA+NON-PRB-NCNCRNG: NOT DETECTED
RHINOVIRUS RNA SPEC NAA+PROBE: NOT DETECTED
RSV RNA NPH QL NAA+NON-PROBE: NOT DETECTED
S AUREUS DNA BAL NAA+NON-PRB-NCNCRNG: NOT DETECTED
S MARCESCENS DNA BAL NAA+NON-PRB-NCNCRNG: NOT DETECTED
S PNEUM DNA BAL NAA+NON-PRB-NCNCRNG: NOT DETECTED
S PYO DNA BAL NAA+NON-PRB-NCNCRNG: NOT DETECTED
SODIUM SERPL-SCNC: 139 MMOL/L (ref 136–145)
VANCOMYCIN SERPL-MCNC: 8.51 MCG/ML (ref 5–40)
VISUAL PRESENCE OF BLOOD: NORMAL

## 2025-01-28 PROCEDURE — 25010000002 VANCOMYCIN 5 G RECONSTITUTED SOLUTION: Performed by: INTERNAL MEDICINE

## 2025-01-28 PROCEDURE — 89051 BODY FLUID CELL COUNT: CPT | Performed by: INTERNAL MEDICINE

## 2025-01-28 PROCEDURE — 31624 DX BRONCHOSCOPE/LAVAGE: CPT | Performed by: INTERNAL MEDICINE

## 2025-01-28 PROCEDURE — 87205 SMEAR GRAM STAIN: CPT | Performed by: INTERNAL MEDICINE

## 2025-01-28 PROCEDURE — 88108 CYTOPATH CONCENTRATE TECH: CPT | Performed by: INTERNAL MEDICINE

## 2025-01-28 PROCEDURE — 25010000002 PROPOFOL 10 MG/ML EMULSION: Performed by: NURSE ANESTHETIST, CERTIFIED REGISTERED

## 2025-01-28 PROCEDURE — 82948 REAGENT STRIP/BLOOD GLUCOSE: CPT

## 2025-01-28 PROCEDURE — 25010000002 MEROPENEM PER 100 MG: Performed by: INTERNAL MEDICINE

## 2025-01-28 PROCEDURE — 87106 FUNGI IDENTIFICATION YEAST: CPT | Performed by: INTERNAL MEDICINE

## 2025-01-28 PROCEDURE — 63710000001 INSULIN LISPRO (HUMAN) PER 5 UNITS: Performed by: INTERNAL MEDICINE

## 2025-01-28 PROCEDURE — 25010000002 MIDAZOLAM PER 1MG: Performed by: ANESTHESIOLOGY

## 2025-01-28 PROCEDURE — 99232 SBSQ HOSP IP/OBS MODERATE 35: CPT | Performed by: STUDENT IN AN ORGANIZED HEALTH CARE EDUCATION/TRAINING PROGRAM

## 2025-01-28 PROCEDURE — 94669 MECHANICAL CHEST WALL OSCILL: CPT

## 2025-01-28 PROCEDURE — 94664 DEMO&/EVAL PT USE INHALER: CPT

## 2025-01-28 PROCEDURE — 25810000003 SODIUM CHLORIDE 0.9 % SOLUTION: Performed by: INTERNAL MEDICINE

## 2025-01-28 PROCEDURE — 82948 REAGENT STRIP/BLOOD GLUCOSE: CPT | Performed by: NURSE ANESTHETIST, CERTIFIED REGISTERED

## 2025-01-28 PROCEDURE — 82948 REAGENT STRIP/BLOOD GLUCOSE: CPT | Performed by: STUDENT IN AN ORGANIZED HEALTH CARE EDUCATION/TRAINING PROGRAM

## 2025-01-28 PROCEDURE — 80048 BASIC METABOLIC PNL TOTAL CA: CPT | Performed by: STUDENT IN AN ORGANIZED HEALTH CARE EDUCATION/TRAINING PROGRAM

## 2025-01-28 PROCEDURE — 87206 SMEAR FLUORESCENT/ACID STAI: CPT | Performed by: INTERNAL MEDICINE

## 2025-01-28 PROCEDURE — 80202 ASSAY OF VANCOMYCIN: CPT | Performed by: STUDENT IN AN ORGANIZED HEALTH CARE EDUCATION/TRAINING PROGRAM

## 2025-01-28 PROCEDURE — 94799 UNLISTED PULMONARY SVC/PX: CPT

## 2025-01-28 PROCEDURE — 25010000002 SUGAMMADEX 200 MG/2ML SOLUTION: Performed by: NURSE ANESTHETIST, CERTIFIED REGISTERED

## 2025-01-28 PROCEDURE — 25010000002 METHYLPREDNISOLONE PER 40 MG: Performed by: STUDENT IN AN ORGANIZED HEALTH CARE EDUCATION/TRAINING PROGRAM

## 2025-01-28 PROCEDURE — 87077 CULTURE AEROBIC IDENTIFY: CPT | Performed by: INTERNAL MEDICINE

## 2025-01-28 PROCEDURE — 0B9F8ZX DRAINAGE OF RIGHT LOWER LUNG LOBE, VIA NATURAL OR ARTIFICIAL OPENING ENDOSCOPIC, DIAGNOSTIC: ICD-10-PCS | Performed by: INTERNAL MEDICINE

## 2025-01-28 PROCEDURE — 87102 FUNGUS ISOLATION CULTURE: CPT | Performed by: INTERNAL MEDICINE

## 2025-01-28 PROCEDURE — 25010000002 ENOXAPARIN PER 10 MG: Performed by: INTERNAL MEDICINE

## 2025-01-28 PROCEDURE — 87116 MYCOBACTERIA CULTURE: CPT | Performed by: INTERNAL MEDICINE

## 2025-01-28 PROCEDURE — 25010000002 LIDOCAINE PF 2% 2 % SOLUTION: Performed by: NURSE ANESTHETIST, CERTIFIED REGISTERED

## 2025-01-28 PROCEDURE — 25010000002 METOCLOPRAMIDE PER 10 MG: Performed by: ANESTHESIOLOGY

## 2025-01-28 PROCEDURE — 87186 SC STD MICRODIL/AGAR DIL: CPT | Performed by: INTERNAL MEDICINE

## 2025-01-28 PROCEDURE — 82948 REAGENT STRIP/BLOOD GLUCOSE: CPT | Performed by: INTERNAL MEDICINE

## 2025-01-28 PROCEDURE — 25810000003 LACTATED RINGERS PER 1000 ML: Performed by: ANESTHESIOLOGY

## 2025-01-28 PROCEDURE — 63710000001 REVEFENACIN 175 MCG/3ML SOLUTION: Performed by: INTERNAL MEDICINE

## 2025-01-28 PROCEDURE — 63710000001 INSULIN LISPRO (HUMAN) PER 5 UNITS: Performed by: STUDENT IN AN ORGANIZED HEALTH CARE EDUCATION/TRAINING PROGRAM

## 2025-01-28 PROCEDURE — 63710000001 INSULIN GLARGINE PER 5 UNITS: Performed by: STUDENT IN AN ORGANIZED HEALTH CARE EDUCATION/TRAINING PROGRAM

## 2025-01-28 PROCEDURE — 25010000002 DEXAMETHASONE PER 1 MG: Performed by: NURSE ANESTHETIST, CERTIFIED REGISTERED

## 2025-01-28 PROCEDURE — 87633 RESP VIRUS 12-25 TARGETS: CPT | Performed by: INTERNAL MEDICINE

## 2025-01-28 PROCEDURE — 25010000002 MEROPENEM PER 100 MG: Performed by: STUDENT IN AN ORGANIZED HEALTH CARE EDUCATION/TRAINING PROGRAM

## 2025-01-28 PROCEDURE — 99233 SBSQ HOSP IP/OBS HIGH 50: CPT | Performed by: INTERNAL MEDICINE

## 2025-01-28 PROCEDURE — 87071 CULTURE AEROBIC QUANT OTHER: CPT | Performed by: INTERNAL MEDICINE

## 2025-01-28 PROCEDURE — 25010000002 ONDANSETRON PER 1 MG: Performed by: NURSE ANESTHETIST, CERTIFIED REGISTERED

## 2025-01-28 RX ORDER — PROPOFOL 10 MG/ML
VIAL (ML) INTRAVENOUS AS NEEDED
Status: DISCONTINUED | OUTPATIENT
Start: 2025-01-28 | End: 2025-01-28 | Stop reason: SURG

## 2025-01-28 RX ORDER — OXYCODONE HYDROCHLORIDE 5 MG/1
5 TABLET ORAL
Status: DISCONTINUED | OUTPATIENT
Start: 2025-01-28 | End: 2025-01-28 | Stop reason: HOSPADM

## 2025-01-28 RX ORDER — BISACODYL 5 MG/1
5 TABLET, DELAYED RELEASE ORAL DAILY
Status: DISCONTINUED | OUTPATIENT
Start: 2025-01-28 | End: 2025-01-30

## 2025-01-28 RX ORDER — ONDANSETRON 2 MG/ML
4 INJECTION INTRAMUSCULAR; INTRAVENOUS ONCE AS NEEDED
Status: DISCONTINUED | OUTPATIENT
Start: 2025-01-28 | End: 2025-01-28 | Stop reason: HOSPADM

## 2025-01-28 RX ORDER — FLUMAZENIL 0.1 MG/ML
INJECTION INTRAVENOUS AS NEEDED
Status: DISCONTINUED | OUTPATIENT
Start: 2025-01-28 | End: 2025-01-28 | Stop reason: SURG

## 2025-01-28 RX ORDER — MIDAZOLAM HYDROCHLORIDE 2 MG/2ML
2 INJECTION, SOLUTION INTRAMUSCULAR; INTRAVENOUS ONCE
Status: COMPLETED | OUTPATIENT
Start: 2025-01-28 | End: 2025-01-28

## 2025-01-28 RX ORDER — KETAMINE HYDROCHLORIDE 10 MG/ML
INJECTION, SOLUTION INTRAMUSCULAR; INTRAVENOUS AS NEEDED
Status: DISCONTINUED | OUTPATIENT
Start: 2025-01-28 | End: 2025-01-28 | Stop reason: SURG

## 2025-01-28 RX ORDER — AMOXICILLIN 250 MG
2 CAPSULE ORAL 2 TIMES DAILY PRN
Status: DISCONTINUED | OUTPATIENT
Start: 2025-01-28 | End: 2025-01-30

## 2025-01-28 RX ORDER — PROMETHAZINE HYDROCHLORIDE 25 MG/1
25 SUPPOSITORY RECTAL ONCE AS NEEDED
Status: DISCONTINUED | OUTPATIENT
Start: 2025-01-28 | End: 2025-01-28 | Stop reason: HOSPADM

## 2025-01-28 RX ORDER — POLYETHYLENE GLYCOL 3350 17 G/17G
17 POWDER, FOR SOLUTION ORAL DAILY
Status: DISCONTINUED | OUTPATIENT
Start: 2025-01-28 | End: 2025-01-30

## 2025-01-28 RX ORDER — PETROLATUM,WHITE
OINTMENT IN PACKET (GRAM) TOPICAL AS NEEDED
Status: DISCONTINUED | OUTPATIENT
Start: 2025-01-28 | End: 2025-01-28 | Stop reason: SURG

## 2025-01-28 RX ORDER — ROCURONIUM BROMIDE 10 MG/ML
INJECTION, SOLUTION INTRAVENOUS AS NEEDED
Status: DISCONTINUED | OUTPATIENT
Start: 2025-01-28 | End: 2025-01-28 | Stop reason: SURG

## 2025-01-28 RX ORDER — PROMETHAZINE HYDROCHLORIDE 25 MG/1
25 TABLET ORAL ONCE AS NEEDED
Status: DISCONTINUED | OUTPATIENT
Start: 2025-01-28 | End: 2025-01-28 | Stop reason: HOSPADM

## 2025-01-28 RX ORDER — BISACODYL 10 MG
10 SUPPOSITORY, RECTAL RECTAL DAILY PRN
Status: DISCONTINUED | OUTPATIENT
Start: 2025-01-28 | End: 2025-01-30

## 2025-01-28 RX ORDER — INSULIN LISPRO 100 [IU]/ML
2-9 INJECTION, SOLUTION INTRAVENOUS; SUBCUTANEOUS
Status: DISCONTINUED | OUTPATIENT
Start: 2025-01-28 | End: 2025-02-20 | Stop reason: HOSPADM

## 2025-01-28 RX ORDER — DEXAMETHASONE SODIUM PHOSPHATE 4 MG/ML
INJECTION, SOLUTION INTRA-ARTICULAR; INTRALESIONAL; INTRAMUSCULAR; INTRAVENOUS; SOFT TISSUE AS NEEDED
Status: DISCONTINUED | OUTPATIENT
Start: 2025-01-28 | End: 2025-01-28 | Stop reason: SURG

## 2025-01-28 RX ORDER — SODIUM CHLORIDE, SODIUM LACTATE, POTASSIUM CHLORIDE, CALCIUM CHLORIDE 600; 310; 30; 20 MG/100ML; MG/100ML; MG/100ML; MG/100ML
9 INJECTION, SOLUTION INTRAVENOUS CONTINUOUS PRN
Status: ACTIVE | OUTPATIENT
Start: 2025-01-28 | End: 2025-01-29

## 2025-01-28 RX ORDER — LIDOCAINE HYDROCHLORIDE 20 MG/ML
INJECTION, SOLUTION EPIDURAL; INFILTRATION; INTRACAUDAL; PERINEURAL AS NEEDED
Status: DISCONTINUED | OUTPATIENT
Start: 2025-01-28 | End: 2025-01-28 | Stop reason: SURG

## 2025-01-28 RX ORDER — ACETAMINOPHEN 500 MG
1000 TABLET ORAL ONCE
Status: COMPLETED | OUTPATIENT
Start: 2025-01-28 | End: 2025-01-28

## 2025-01-28 RX ORDER — ONDANSETRON 2 MG/ML
INJECTION INTRAMUSCULAR; INTRAVENOUS AS NEEDED
Status: DISCONTINUED | OUTPATIENT
Start: 2025-01-28 | End: 2025-01-28 | Stop reason: SURG

## 2025-01-28 RX ADMIN — ONDANSETRON 4 MG: 2 INJECTION INTRAMUSCULAR; INTRAVENOUS at 08:49

## 2025-01-28 RX ADMIN — WATER 40 MG: 1 INJECTION INTRAMUSCULAR; INTRAVENOUS; SUBCUTANEOUS at 05:00

## 2025-01-28 RX ADMIN — TRAZODONE HYDROCHLORIDE 150 MG: 100 TABLET ORAL at 20:47

## 2025-01-28 RX ADMIN — INSULIN LISPRO 5 UNITS: 100 INJECTION, SOLUTION INTRAVENOUS; SUBCUTANEOUS at 12:24

## 2025-01-28 RX ADMIN — PROPOFOL 200 MCG/KG/MIN: 10 INJECTION, EMULSION INTRAVENOUS at 08:41

## 2025-01-28 RX ADMIN — VANCOMYCIN HYDROCHLORIDE 1250 MG: 5 INJECTION, POWDER, LYOPHILIZED, FOR SOLUTION INTRAVENOUS at 12:24

## 2025-01-28 RX ADMIN — PROPOFOL 60 MG: 10 INJECTION, EMULSION INTRAVENOUS at 08:37

## 2025-01-28 RX ADMIN — INSULIN LISPRO 8 UNITS: 100 INJECTION, SOLUTION INTRAVENOUS; SUBCUTANEOUS at 20:47

## 2025-01-28 RX ADMIN — TOBRAMYCIN 300 MG: 300 SOLUTION RESPIRATORY (INHALATION) at 11:12

## 2025-01-28 RX ADMIN — DEXAMETHASONE SODIUM PHOSPHATE 8 MG: 4 INJECTION, SOLUTION INTRAMUSCULAR; INTRAVENOUS at 08:32

## 2025-01-28 RX ADMIN — METOCLOPRAMIDE 10 MG: 5 INJECTION, SOLUTION INTRAMUSCULAR; INTRAVENOUS at 05:44

## 2025-01-28 RX ADMIN — BUDESONIDE 0.5 MG: 0.5 INHALANT RESPIRATORY (INHALATION) at 11:14

## 2025-01-28 RX ADMIN — SODIUM CHLORIDE, POTASSIUM CHLORIDE, SODIUM LACTATE AND CALCIUM CHLORIDE 9 ML/HR: 600; 310; 30; 20 INJECTION, SOLUTION INTRAVENOUS at 08:14

## 2025-01-28 RX ADMIN — BUDESONIDE 0.5 MG: 0.5 INHALANT RESPIRATORY (INHALATION) at 18:14

## 2025-01-28 RX ADMIN — INSULIN LISPRO 7 UNITS: 100 INJECTION, SOLUTION INTRAVENOUS; SUBCUTANEOUS at 12:25

## 2025-01-28 RX ADMIN — ASPIRIN 81 MG: 81 TABLET, COATED ORAL at 10:45

## 2025-01-28 RX ADMIN — CARVEDILOL 25 MG: 25 TABLET, FILM COATED ORAL at 20:47

## 2025-01-28 RX ADMIN — HYDROCODONE BITARTRATE AND ACETAMINOPHEN 1 TABLET: 5; 325 TABLET ORAL at 10:45

## 2025-01-28 RX ADMIN — REVEFENACIN 175 MCG: 175 SOLUTION RESPIRATORY (INHALATION) at 11:13

## 2025-01-28 RX ADMIN — SUGAMMADEX 200 MG: 100 INJECTION, SOLUTION INTRAVENOUS at 08:49

## 2025-01-28 RX ADMIN — ARFORMOTEROL TARTRATE 15 MCG: 15 SOLUTION RESPIRATORY (INHALATION) at 18:14

## 2025-01-28 RX ADMIN — GUAIFENESIN 600 MG: 600 TABLET ORAL at 20:47

## 2025-01-28 RX ADMIN — INSULIN GLARGINE 25 UNITS: 100 INJECTION, SOLUTION SUBCUTANEOUS at 20:47

## 2025-01-28 RX ADMIN — FLUMAZENIL 0.2 MG: 0.1 INJECTION, SOLUTION INTRAVENOUS at 09:01

## 2025-01-28 RX ADMIN — FAMOTIDINE 40 MG: 20 TABLET ORAL at 06:21

## 2025-01-28 RX ADMIN — TOBRAMYCIN 300 MG: 300 SOLUTION RESPIRATORY (INHALATION) at 18:12

## 2025-01-28 RX ADMIN — NIFEDIPINE 30 MG: 30 TABLET, FILM COATED, EXTENDED RELEASE ORAL at 06:21

## 2025-01-28 RX ADMIN — POLYETHYLENE GLYCOL 3350 17 G: 17 POWDER, FOR SOLUTION ORAL at 15:32

## 2025-01-28 RX ADMIN — MIDAZOLAM HYDROCHLORIDE 2 MG: 1 INJECTION, SOLUTION INTRAMUSCULAR; INTRAVENOUS at 08:14

## 2025-01-28 RX ADMIN — BISACODYL 5 MG: 5 TABLET, COATED ORAL at 15:32

## 2025-01-28 RX ADMIN — ARFORMOTEROL TARTRATE 15 MCG: 15 SOLUTION RESPIRATORY (INHALATION) at 11:15

## 2025-01-28 RX ADMIN — BUSPIRONE HYDROCHLORIDE 15 MG: 5 TABLET ORAL at 20:47

## 2025-01-28 RX ADMIN — SENNOSIDES AND DOCUSATE SODIUM 2 TABLET: 50; 8.6 TABLET ORAL at 20:54

## 2025-01-28 RX ADMIN — LIDOCAINE HYDROCHLORIDE 50 MG: 20 INJECTION, SOLUTION EPIDURAL; INFILTRATION; INTRACAUDAL; PERINEURAL at 08:37

## 2025-01-28 RX ADMIN — Medication 1 G: at 08:38

## 2025-01-28 RX ADMIN — BUMETANIDE 2 MG: 1 TABLET ORAL at 20:47

## 2025-01-28 RX ADMIN — IPRATROPIUM BROMIDE AND ALBUTEROL SULFATE 3 ML: .5; 3 SOLUTION RESPIRATORY (INHALATION) at 15:35

## 2025-01-28 RX ADMIN — ROCURONIUM BROMIDE 50 MG: 50 INJECTION INTRAVENOUS at 08:37

## 2025-01-28 RX ADMIN — ACETAMINOPHEN 1000 MG: 500 TABLET ORAL at 08:14

## 2025-01-28 RX ADMIN — MEROPENEM 1000 MG: 1 INJECTION INTRAVENOUS at 01:39

## 2025-01-28 RX ADMIN — Medication 10 ML: at 20:48

## 2025-01-28 RX ADMIN — CARVEDILOL 25 MG: 25 TABLET, FILM COATED ORAL at 10:45

## 2025-01-28 RX ADMIN — KETAMINE HYDROCHLORIDE 50 MG: 10 INJECTION INTRAMUSCULAR; INTRAVENOUS at 08:37

## 2025-01-28 RX ADMIN — MONTELUKAST 10 MG: 10 TABLET, FILM COATED ORAL at 20:47

## 2025-01-28 RX ADMIN — ENOXAPARIN SODIUM 40 MG: 100 INJECTION SUBCUTANEOUS at 20:48

## 2025-01-28 RX ADMIN — MEROPENEM 1000 MG: 1 INJECTION INTRAVENOUS at 15:32

## 2025-01-28 RX ADMIN — INSULIN LISPRO 8 UNITS: 100 INJECTION, SOLUTION INTRAVENOUS; SUBCUTANEOUS at 17:31

## 2025-01-28 RX ADMIN — INSULIN LISPRO 7 UNITS: 100 INJECTION, SOLUTION INTRAVENOUS; SUBCUTANEOUS at 17:31

## 2025-01-28 NOTE — OP NOTE
Procedure name: bronchoscopy with bronchoalveolar lavage     Indication: Bronchiectasis with acute exacerbation and failure to clear airway     Sedation-IV MAC per anesthesia service     Procedure details:  Patient was brought back to the bronchoscopy suite, a bite block was placed in the oral cavity, and a therapeutic bronchoscope was then used to intubate the trachea. The vocal cords inspected and appeared to have normal motion with inhalation and ventilation.  Inspection was performed of the left tracheobronchial tree and there was no endobronchial lesion seen to the segmental level.  Inspection of the right tracheobronchial tree showed no endobronchial lesions to the segmental level.  There was a copious amounts of mucopurulent secretions seen throughout the entire tracheobronchial tree the secretions were thick and yellow in appearance, the bronchoscope was used to meticulously remove these thick secretions, behind the stick secretions there was copious amounts of thin yellow secretions that were draining from both the right upper right lower and left upper left lower lobe there was a fair amount of bronchitis seen throughout the entire tracheobronchial tree A bronchoalveolar lavage was obtained from right lower lobe bronchus        Estimated blood loss:  none     Postoperative diagnosis: Extensive mucous plugging    Patient tolerated procedure well, [no immediate complications        Plan  Transfer back to the floor

## 2025-01-28 NOTE — PLAN OF CARE
Goal Outcome Evaluation:  Plan of Care Reviewed With: patient, spouse        Progress: improving  Outcome Evaluation: Medicated for pain to coccyx x1; patient verbalized effectiveness of medication. Coccyx wound and L heel wound dressed per wound care orders. Lopez catheter in place and lopez care completed. Diverting colostomy scheduled for 1/30; wife updated on surgical plan at bedside.

## 2025-01-28 NOTE — ANESTHESIA POSTPROCEDURE EVALUATION
Patient: Preston Wallis    Procedure Summary       Date: 01/28/25 Room / Location: [facility] OR 01 / [facility] MAIN OR    Anesthesia Start: 0831 Anesthesia Stop: 0907    Procedure: BRONCHOSCOPY: BAL: insertion of lighted instrument to view inside the lung (Bronchus) Diagnosis:       Pneumonia due to Pseudomonas species, unspecified laterality, unspecified part of lung      COPD exacerbation      Bronchiectasis without complication      (Pneumonia due to Pseudomonas species, unspecified laterality, unspecified part of lung [J15.1])      (COPD exacerbation [J44.1])      (Bronchiectasis without complication [J47.9])    Surgeons: Walter Nicole DO Provider: Christie Sultana MD    Anesthesia Type: general ASA Status: 4            Anesthesia Type: general    Vitals  Vitals Value Taken Time   /68 01/28/25 0937   Temp 36.4 °C (97.6 °F) 01/28/25 0909   Pulse 81 01/28/25 0938   Resp 18 01/28/25 0919   SpO2 91 % 01/28/25 0938   Vitals shown include unfiled device data.        Post Anesthesia Care and Evaluation    Patient location during evaluation: bedside  Patient participation: complete - patient participated  Level of consciousness: awake  Pain management: adequate    Airway patency: patent  PONV Status: none  Cardiovascular status: acceptable and stable  Respiratory status: acceptable  Hydration status: acceptable

## 2025-01-28 NOTE — ANESTHESIA PREPROCEDURE EVALUATION
Anesthesia Evaluation     Patient summary reviewed and Nursing notes reviewed   no history of anesthetic complications:   NPO Solid Status: > 8 hours  NPO Liquid Status: > 2 hours           Airway   Mallampati: I  TM distance: >3 FB  Neck ROM: full  No difficulty expected  Dental      Pulmonary - negative pulmonary ROS and normal exam    breath sounds clear to auscultation  (+) pneumonia , COPD,home oxygen, shortness of breath, sleep apnea  Cardiovascular - negative cardio ROS and normal exam  Exercise tolerance: good (4-7 METS)    ECG reviewed  Patient on routine beta blocker and Beta blocker given within 24 hours of surgery  Rhythm: regular  Rate: normal    (+) hypertension, dysrhythmias Paroxysmal Atrial Fib, CHF , PVD, DVT, hyperlipidemia      Neuro/Psych- negative ROS  GI/Hepatic/Renal/Endo - negative ROS   (+) GERD, renal disease- CRI, diabetes mellitus type 2 poorly controlled    Musculoskeletal (-) negative ROS    Abdominal    Substance History - negative use     OB/GYN negative ob/gyn ROS         Other - negative ROS  arthritis,     ROS/Med Hx Other: PAT Nursing Notes unavailable.               Latest Reference Range & Units 01/27/25 05:49   Sodium 136 - 145 mmol/L 141   Potassium 3.5 - 5.2 mmol/L 4.7   Chloride 98 - 107 mmol/L 102   CO2 22.0 - 29.0 mmol/L 29.4 (H)   Anion Gap 5.0 - 15.0 mmol/L 9.6   BUN 6 - 20 mg/dL 90 (H)   Creatinine 0.76 - 1.27 mg/dL 2.22 (H)   BUN/Creatinine Ratio 7.0 - 25.0  40.5 (H)   eGFR >60.0 mL/min/1.73 33.3 (L)   Glucose 65 - 99 mg/dL 389 (H)   Calcium 8.6 - 10.5 mg/dL 7.9 (L)   (H): Data is abnormally high  (L): Data is abnormally low     Latest Reference Range & Units 01/27/25 05:49   Hemoglobin 13.0 - 17.7 g/dL 7.4 (L)   Hematocrit 37.5 - 51.0 % 24.9 (L)   (L): Data is abnormally low      Interpretation Summary         Left ventricular ejection fraction is mildly reduced (Calculated EF = 40%).    Low probability of ischemia during this study, patient may had an apical  infarct versus apical thinning..    Findings consistent with an equivocal ECG stress test.     BNORMAL ECG -  Sinus rhythm  Nonspecific  intraventricular conduction delay  Abnormal T, consider ischemia, lateral leads  When compared with ECG of 14-Apr-2024 19:09:06,  Significant rate increase  Significant repolarization change       Anesthesia Plan    ASA 4     general     (Patient understands anesthesia not responsible for dental damage.)  intravenous induction     Anesthetic plan, risks, benefits, and alternatives have been provided, discussed and informed consent has been obtained with: patient.    Use of blood products discussed with patient .    Plan discussed with CRNA.        CODE STATUS:    Code Status (Patient has no pulse and is not breathing): CPR (Attempt to Resuscitate)  Medical Interventions (Patient has pulse or is breathing): Full Support

## 2025-01-28 NOTE — PROGRESS NOTES
[facility] Clinical Pharmacy Services: Vancomycin Monitoring Note    Preston Wallis is a 59 y.o. male who is on day 2/7 of pharmacy to dose vancomycin for Skin and Soft Tissue.    Previous Vancomycin Dose:   1500 mg IV x1 1/27 @ 0910  Imaging Reviewed?: Yes  Updated Cultures and Sensitivities:   Microbiology Results (last 10 days)       Procedure Component Value - Date/Time    Wound Culture - Surgical Site, Perirectal Abscess [[id number]] Collected: 01/26/25 1629    Lab Status: Preliminary result Specimen: Surgical Site from Perirectal Abscess Updated: 01/26/25 1858     Gram Stain Few (2+) WBCs seen      Rare (1+) Gram positive cocci in pairs and chains    AFB Culture - Surgical Site, Perirectal Abscess [[id number]] Collected: 01/26/25 1629    Lab Status: Preliminary result Specimen: Surgical Site from Perirectal Abscess Updated: 01/27/25 1358     AFB Stain No acid fast bacilli seen    Respiratory Culture - Sputum, Cough [[id number]]  (Abnormal) Collected: 01/24/25 1618    Lab Status: Preliminary result Specimen: Sputum from Cough Updated: 01/27/25 0858     Respiratory Culture Moderate growth (3+) Gram Negative Bacilli      Scant growth (1+) Normal Respiratory Nola     Gram Stain Few (2+) WBCs seen      Rare (1+) Gram positive cocci      Rare (1+) Gram negative bacilli    Narrative:      ID/MICs to follow      AFB Culture - Sputum, Cough [[id number]] Collected: 01/24/25 1618    Lab Status: Preliminary result Specimen: Sputum from Cough Updated: 01/27/25 1402     AFB Stain No acid fast bacilli seen on direct smear      No acid fast bacilli seen on concentrated smear    S. Pneumo Ag Urine or CSF - Urine, Indwelling Urethral Catheter [[id number]]  (Normal) Collected: 01/23/25 1912    Lab Status: Final result Specimen: Urine from Indwelling Urethral Catheter Updated: 01/24/25 1646     Strep Pneumo Ag Negative    Legionella Antigen, Urine - Urine, Indwelling Urethral Catheter [[id number]]  (Normal) Collected: 01/23/25 1912  "   Lab Status: Final result Specimen: Urine from Indwelling Urethral Catheter Updated: 25 1646     LEGIONELLA ANTIGEN, URINE Negative    Blood Culture - Blood, Arm, Right [[id number]]  (Normal) Collected: 25 1748    Lab Status: Preliminary result Specimen: Blood from Arm, Right Updated: 25 1800     Blood Culture No growth at 4 days    Narrative:      Less than seven (7) mL's of blood was collected.  Insufficient quantity may yield false negative results.    Blood Culture - Blood, Hand, Left [[id number]]  (Normal) Collected: 25 1735    Lab Status: Preliminary result Specimen: Blood from Hand, Left Updated: 25 1745     Blood Culture No growth at 4 days    Narrative:      Less than seven (7) mL's of blood was collected.  Insufficient quantity may yield false negative results.    COVID-19, FLU A/B, RSV PCR 1 HR TAT - Swab, Nasopharynx [[id number]]  (Normal) Collected: 25 1723    Lab Status: Final result Specimen: Swab from Nasopharynx Updated: 25 1806     COVID19 Not Detected     Influenza A PCR Not Detected     Influenza B PCR Not Detected     RSV, PCR Not Detected    Narrative:      Fact sheet for providers: https://www.fda.gov/media/831445/download    Fact sheet for patients: https://www.fda.gov/media/466128/download    Test performed by PCR.              Vitals/Labs  Ht: 175.3 cm (69\"); Wt: 118 kg (260 lb 2.3 oz)   Temp (24hrs), Av.8 °F (36.6 °C), Min:97.5 °F (36.4 °C), Max:98.3 °F (36.8 °C)   Estimated Creatinine Clearance: 39.7 mL/min (A) (by C-G formula based on SCr of 2.54 mg/dL (H)).     Results from last 7 days   Lab Units 25  0303 25  0549 25  0349 25  0534   VANCOMYCIN RM mcg/mL 8.51  --   --   --    CREATININE mg/dL 2.54* 2.22* 2.11* 2.14*   WBC 10*3/mm3  --  14.02* 16.29* 16.89*     Assessment/Plan    Current Vancomycin Dose: pulse dosing with 1250 mg IV once on  at 0800  Next Vanc Random ordered for  at 0600  We will continue to " monitor patient changes and renal function     Thank you for involving pharmacy in this patient's care. Please contact pharmacy with any questions or concerns.    Snehal Myers  Clinical Pharmacist

## 2025-01-28 NOTE — PLAN OF CARE
Goal Outcome Evaluation:              Outcome Evaluation: No complaints of pain.  Wound care per order.

## 2025-01-28 NOTE — CONSULTS
"Nutrition Services    Patient Name: Preston Wallis  YOB: 1965  MRN: [id number]  Admission date: 1/23/2025      CLINICAL NUTRITION ASSESSMENT      Reason for Assessment  Follow Up   H&P:  Past Medical History:   Diagnosis Date    1. Incision and drainage of posterior perianal abscess 2. Sharp excisional debridement through skin and subcutaneous tissue in the posterior perianal area, 9 x 6 x 1 cm 01/26/2025    Age-related cognitive decline     Allergic contact dermatitis     Allergies     Anemia     Bedbound     11/2023 \"MY LEG MUSCLES STOPPED WORKING\"    Bronchiectasis with acute lower respiratory infection     Charcot foot due to diabetes mellitus 09/10/2013    Chronic diastolic (congestive) heart failure     Chronic kidney disease     Chronic respiratory failure with hypoxia     Closed supracondylar fracture of femur 01/12/2022    COPD (chronic obstructive pulmonary disease)     Deep vein thrombosis (DVT) of lower extremity associated with air travel 01/13/2023    Dependence on supplemental oxygen     Eczema     Erectile dysfunction     due to organic reasons    Essential (primary) hypertension     Fracture     closed fracture of other tarsal and metatarsal bones    Fracture of proximal humerus 01/13/2023    GERD without esophagitis     High risk medication use     Hypercholesteremia     Hypomagnesemia     Infected stasis ulcer of left lower extremity 01/13/2023    Insomnia     Low back pain     Major depressive disorder     Morbid (severe) obesity due to excess calories     MRSA pneumonia     Muscle weakness     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 without evidence of necrosis     Obstructive sleep apnea (adult) (pediatric)     On home O2     REPORTS WEARING 2L/NC AAT    Other forms of dyspnea     Other long term (current) drug therapy     Other specified noninfective gastroenteritis and colitis     Other spondylosis, lumbar region     Pain in both knees  " "   Paroxysmal atrial fibrillation     Peripheral neuropathy     attributed to type 2 diabetes    Pneumonia, unspecified organism     Polyneuropathy     Rash and other nonspecific skin eruption     Self-catheterizes urinary bladder     EVERY 4 HOURS    Smoking     \"SOMETIMES\"    Syncope and collapse     Tachycardia     Tinnitus 01/13/2023    Type 1 diabetes mellitus with diabetic chronic kidney disease     Type 2 diabetes mellitus     Unspecified fall, initial encounter     Urinary retention         Current Problems:   Active Hospital Problems    Diagnosis     **PNA (pneumonia)     1. Incision and drainage of posterior perianal abscess 2. Sharp excisional debridement through skin and subcutaneous tissue in the posterior perianal area, 9 x 6 x 1 cm     Perianal abscess     Pneumonia due to Pseudomonas species     Bronchiectasis without complication     COPD exacerbation         Nutrition/Diet History         Narrative   Patient OOR at this time. Currently in OR POOL. Chart graphics reveal 100% meal intake. Currently npo for bronchoscopy. Anticipate diet to resume post procedure. Last documented BM 1/22, prn stool softeners ordered. No new wound assessments at this time. Will continue with current nutrition intervention in place.       Anthropometrics        Current Height, Weight Height: 175.3 cm (69\")  Weight: 118 kg (260 lb 2.3 oz)   Current BMI Body mass index is 38.42 kg/m².   BMI Classification Obese Class II   % %       Weight Hx  Wt Readings from Last 30 Encounters:   01/27/25 0500 118 kg (260 lb 2.3 oz)   01/26/25 0308 113 kg (248 lb 14.4 oz)   01/25/25 0429 111 kg (245 lb 2.4 oz)   01/24/25 0924 111 kg (244 lb 0.8 oz)   01/23/25 1555 123 kg (270 lb 4.5 oz)   12/12/24 1050 109 kg (241 lb)   12/11/24 1048 109 kg (241 lb)   12/05/24 1155 111 kg (244 lb)   10/30/24 1004 111 kg (245 lb)   08/22/24 1439 109 kg (241 lb)   08/15/24 1325 112 kg (247 lb 9.2 oz)   08/12/24 1238 126 kg (278 lb)   07/18/24 1326 126 " kg (278 lb)   05/20/24 1030 119 kg (263 lb)   05/17/24 1151 119 kg (263 lb)   05/17/24 1028 119 kg (263 lb)   05/03/24 0432 125 kg (275 lb 5.7 oz)   05/01/24 0438 124 kg (272 lb 14.9 oz)   04/30/24 0600 127 kg (279 lb 5.2 oz)   04/29/24 2114 126 kg (277 lb 9 oz)   04/29/24 0241 116 kg (255 lb 11.7 oz)   04/19/24 0614 116 kg (255 lb 1.2 oz)   04/18/24 0617 119 kg (262 lb 2 oz)   04/17/24 0616 115 kg (252 lb 13.9 oz)   04/16/24 0519 124 kg (272 lb 11.3 oz)   04/15/24 0617 124 kg (272 lb 14.9 oz)   04/15/24 0057 124 kg (273 lb 9.5 oz)   04/15/24 0050 124 kg (273 lb 9.5 oz)   04/14/24 1908 127 kg (279 lb 1.6 oz)   04/04/24 1042 122 kg (270 lb)   02/16/24 1458 126 kg (276 lb 10.8 oz)   01/30/24 1032 128 kg (282 lb)   01/11/24 1556 123 kg (272 lb)   12/15/23 0330 127 kg (280 lb)   12/14/23 1400 126 kg (277 lb)   12/06/23 1904 126 kg (277 lb 12.5 oz)   11/20/23 0953 126 kg (278 lb)   11/17/23 1019 126 kg (278 lb)   11/06/23 1119 126 kg (278 lb)   10/11/23 1020 126 kg (278 lb)   08/22/23 0917 126 kg (278 lb)   08/21/23 1017 129 kg (285 lb)   07/24/23 0849 129 kg (285 lb)   07/19/23 1346 129 kg (285 lb)   06/29/23 1009 132 kg (290 lb)   03/23/23 1508 132 kg (290 lb)          Wt Change Observation -12.2% x 6 months     Estimated/Assessed Needs  Estimated Needs based on: Ideal Body Weight       Energy Requirements 25-30 kcal/kg   EST Needs (kcal/day) 7221-4560       Protein Requirements 1.2-1.5 g.kg   EST Daily Needs (g/day)        Fluid Requirements 1 ml/kcal    Estimated Needs (mL/day) 6815-9183     Labs/Medications         Pertinent Labs Reviewed.   Results from last 7 days   Lab Units 01/28/25  0303 01/27/25  0549 01/25/25  0349 01/24/25  0534 01/23/25  1614   SODIUM mmol/L 139 141 140   < > 135*   POTASSIUM mmol/L 4.8 4.7 4.2   < > 4.9   CHLORIDE mmol/L 100 102 101   < > 94*   CO2 mmol/L 29.4* 29.4* 28.8   < > 30.4*   BUN mg/dL 100* 90* 63*   < > 50*   CREATININE mg/dL 2.54* 2.22* 2.11*   < > 2.16*   CALCIUM mg/dL  7.8* 7.9* 7.9*   < > 8.6   BILIRUBIN mg/dL  --   --   --   --  0.2   ALK PHOS U/L  --   --   --   --  200*   ALT (SGPT) U/L  --   --   --   --  42*   AST (SGOT) U/L  --   --   --   --  49*   GLUCOSE mg/dL 388* 389* 363*   < > 252*    < > = values in this interval not displayed.     Results from last 7 days   Lab Units 01/27/25  0549 01/25/25  0349   HEMOGLOBIN g/dL 7.4* 7.9*   HEMATOCRIT % 24.9* 26.2*   TRIGLYCERIDES mg/dL  --  49     COVID19   Date Value Ref Range Status   01/23/2025 Not Detected Not Detected - Ref. Range Final     Lab Results   Component Value Date    HGBA1C 8.8 (A) 12/11/2024         Pertinent Medications Reviewed.     Malnutrition Severity Assessment              Nutrition Diagnosis         Nutrition Dx Problem 1 Increased nutrient needs related to increased protein demand as evidenced by impaired skin integrity.     Nutrition Intervention           Current Nutrition Orders & Evaluation of Intake       Current PO Diet NPO Diet NPO Type: Strict NPO   Supplement Orders Placed This Encounter      Dietary Nutrition Supplements Rosalino; orange      DIET MESSAGE No Caffeine due to testing  on 1-27-24           Nutrition Intervention/Prescription        Rosalino BID mixed with diet Sprite.         Medical Nutrition Therapy/Nutrition Education          Learner     Readiness N/A OOR  N/A     Method     Response N/A  N/A     Monitor/Evaluation        Monitor Per protocol, PO intake, Supplement intake, Weight, Skin status, POC/GOC     Nutrition Discharge Plan         To be determined     Electronically signed by:  Fanny Tucker RD  01/28/25 09:18 EST

## 2025-01-28 NOTE — SIGNIFICANT NOTE
01/28/25 0700   Physical Therapy Time and Intention   Session Not Performed patient unavailable for evaluation  (bronch)

## 2025-01-28 NOTE — PROGRESS NOTES
Pulmonary / Critical Care Progress Note      Patient Name: Preston Wallis  : 1965  MRN: [id number]  Attending:  Yolanda Cuevas*  Date of admission: 2025    Subjective   Subjective   Follow-up for acute on chronic hypoxic respiratory failure    Patient agreeable for bronchoscopy  Has been n.p.o.  Does feel that he has an abundance of secretions that he is unable to expectorate fully    Review of Systems  General: Denied complaints  Cardiovascular:  Denied complaints  Respiratory: + Dyspnea on exertion; denied complaints  Gastrointestinal: Denied complaints        Objective   Objective     Vitals:   Temp:  [97.5 °F (36.4 °C)-98.3 °F (36.8 °C)] 97.7 °F (36.5 °C)  Heart Rate:  [] 86  Resp:  [18-20] 20  BP: (124-175)/(59-72) 175/72  Flow (L/min) (Oxygen Therapy):  [3-5] 3    Physical Exam   Chronically ill-appearing male  Resting comfortably  Scattered crackles      Result Review    Result Review:  I have personally reviewed the results from the time of this admission to 2025 08:08 EST and agree with these findings:  []  Laboratory  []  Microbiology  []  Radiology  []  EKG/Telemetry   []  Cardiology/Vascular   []  Pathology  []  Old records  []  Other:  Most notable findings include:   -     Assessment & Plan   Assessment / Plan     Active Hospital Problems:  Active Hospital Problems    Diagnosis    • **PNA (pneumonia)    • 1. Incision and drainage of posterior perianal abscess 2. Sharp excisional debridement through skin and subcutaneous tissue in the posterior perianal area, 9 x 6 x 1 cm    • Perianal abscess    • Pneumonia due to Pseudomonas species    • Bronchiectasis without complication    • COPD exacerbation          Impression:  Cystic bronchiectasis with acute exacerbation  History of MDR Pseudomonas    Plan:  CT scan patient with extensive cylindrical bronchiectasis plugging and some new spiculated nodular densities  There is concern that the patient could have developed  Mycobacterium avium or multidrug-resistant organisms despite worsening of the scan despite nebulized antibiotic  We will proceed with bronchoscopy today  Discussed risk versus benefits of bronchoscopy the patient  Risks versus benefits of bronchoscopy explained to the patient including death, respiratory failure requiring intubation mechanical ventilation, pneumothorax requiring chest tube placement, bleeding.  Patient voices understanding of the risks of the procedure and wishes to proceed.  Continue current antibiotic  Continue airway clearance  BAL to be sent for AFB fungus and pneumonia panel  Continue LABA LAMA inhaled corticosteroids  Continue nebulized tobramycin  Okay from a standpoint to discontinue systemic steroids at this time  VTE Prophylaxis:  Pharmacologic VTE prophylaxis orders are present.        CODE STATUS:   Code Status (Patient has no pulse and is not breathing): CPR (Attempt to Resuscitate)  Medical Interventions (Patient has pulse or is breathing): Full Support      Labs, images, and medications personally reviewed.  Discussed with patient    Electronically signed by Walter Nicole DO, 01/28/25, 8:10 AM EST.

## 2025-01-29 ENCOUNTER — ANESTHESIA EVENT (OUTPATIENT)
Dept: PERIOP | Facility: HOSPITAL | Age: 60
End: 2025-01-29
Payer: COMMERCIAL

## 2025-01-29 LAB
ANION GAP SERPL CALCULATED.3IONS-SCNC: 7.7 MMOL/L (ref 5–15)
ANISOCYTOSIS BLD QL: NORMAL
BASO STIPL COARSE BLD QL SMEAR: NORMAL
BASOPHILS # BLD AUTO: 0.04 10*3/MM3 (ref 0–0.2)
BASOPHILS NFR BLD AUTO: 0.2 % (ref 0–1.5)
BUN SERPL-MCNC: 98 MG/DL (ref 6–20)
BUN/CREAT SERPL: 41 (ref 7–25)
CALCIUM SPEC-SCNC: 7.9 MG/DL (ref 8.6–10.5)
CHLORIDE SERPL-SCNC: 99 MMOL/L (ref 98–107)
CO2 SERPL-SCNC: 30.3 MMOL/L (ref 22–29)
CREAT SERPL-MCNC: 2.39 MG/DL (ref 0.76–1.27)
CYTO UR: NORMAL
DEPRECATED RDW RBC AUTO: 45.3 FL (ref 37–54)
EGFRCR SERPLBLD CKD-EPI 2021: 30.5 ML/MIN/1.73
EOSINOPHIL # BLD AUTO: 0 10*3/MM3 (ref 0–0.4)
EOSINOPHIL NFR BLD AUTO: 0 % (ref 0.3–6.2)
ERYTHROCYTE [DISTWIDTH] IN BLOOD BY AUTOMATED COUNT: 13.7 % (ref 12.3–15.4)
GLUCOSE BLDC GLUCOMTR-MCNC: 157 MG/DL (ref 70–99)
GLUCOSE BLDC GLUCOMTR-MCNC: 162 MG/DL (ref 70–99)
GLUCOSE BLDC GLUCOMTR-MCNC: 168 MG/DL (ref 70–99)
GLUCOSE BLDC GLUCOMTR-MCNC: 184 MG/DL (ref 70–99)
GLUCOSE SERPL-MCNC: 263 MG/DL (ref 65–99)
HCT VFR BLD AUTO: 24.2 % (ref 37.5–51)
HGB BLD-MCNC: 7.2 G/DL (ref 13–17.7)
HYPOCHROMIA BLD QL: NORMAL
IMM GRANULOCYTES # BLD AUTO: 1.06 10*3/MM3 (ref 0–0.05)
IMM GRANULOCYTES NFR BLD AUTO: 6.2 % (ref 0–0.5)
LAB AP CASE REPORT: NORMAL
LAB AP CLINICAL INFORMATION: NORMAL
LYMPHOCYTES # BLD AUTO: 1.43 10*3/MM3 (ref 0.7–3.1)
LYMPHOCYTES NFR BLD AUTO: 8.4 % (ref 19.6–45.3)
MCH RBC QN AUTO: 27 PG (ref 26.6–33)
MCHC RBC AUTO-ENTMCNC: 29.8 G/DL (ref 31.5–35.7)
MCV RBC AUTO: 90.6 FL (ref 79–97)
MONOCYTES # BLD AUTO: 1.41 10*3/MM3 (ref 0.1–0.9)
MONOCYTES NFR BLD AUTO: 8.3 % (ref 5–12)
NEUTROPHILS NFR BLD AUTO: 13.05 10*3/MM3 (ref 1.7–7)
NEUTROPHILS NFR BLD AUTO: 76.9 % (ref 42.7–76)
NRBC BLD AUTO-RTO: 0.3 /100 WBC (ref 0–0.2)
PATH REPORT.FINAL DX SPEC: NORMAL
PATH REPORT.GROSS SPEC: NORMAL
PLATELET # BLD AUTO: 313 10*3/MM3 (ref 140–450)
PMV BLD AUTO: 9.1 FL (ref 6–12)
POTASSIUM SERPL-SCNC: 4.4 MMOL/L (ref 3.5–5.2)
RBC # BLD AUTO: 2.67 10*6/MM3 (ref 4.14–5.8)
SMALL PLATELETS BLD QL SMEAR: ADEQUATE
SODIUM SERPL-SCNC: 137 MMOL/L (ref 136–145)
VANCOMYCIN SERPL-MCNC: 17.28 MCG/ML (ref 5–40)
WBC MORPH BLD: NORMAL
WBC NRBC COR # BLD AUTO: 16.99 10*3/MM3 (ref 3.4–10.8)

## 2025-01-29 PROCEDURE — 94664 DEMO&/EVAL PT USE INHALER: CPT

## 2025-01-29 PROCEDURE — 25010000002 MEROPENEM PER 100 MG: Performed by: INTERNAL MEDICINE

## 2025-01-29 PROCEDURE — 85007 BL SMEAR W/DIFF WBC COUNT: CPT | Performed by: STUDENT IN AN ORGANIZED HEALTH CARE EDUCATION/TRAINING PROGRAM

## 2025-01-29 PROCEDURE — 25010000002 LINEZOLID 600 MG/300ML SOLUTION: Performed by: STUDENT IN AN ORGANIZED HEALTH CARE EDUCATION/TRAINING PROGRAM

## 2025-01-29 PROCEDURE — 94799 UNLISTED PULMONARY SVC/PX: CPT

## 2025-01-29 PROCEDURE — 99232 SBSQ HOSP IP/OBS MODERATE 35: CPT | Performed by: STUDENT IN AN ORGANIZED HEALTH CARE EDUCATION/TRAINING PROGRAM

## 2025-01-29 PROCEDURE — 85025 COMPLETE CBC W/AUTO DIFF WBC: CPT | Performed by: STUDENT IN AN ORGANIZED HEALTH CARE EDUCATION/TRAINING PROGRAM

## 2025-01-29 PROCEDURE — 63710000001 INSULIN LISPRO (HUMAN) PER 5 UNITS: Performed by: STUDENT IN AN ORGANIZED HEALTH CARE EDUCATION/TRAINING PROGRAM

## 2025-01-29 PROCEDURE — 80202 ASSAY OF VANCOMYCIN: CPT | Performed by: INTERNAL MEDICINE

## 2025-01-29 PROCEDURE — 63710000001 INSULIN GLARGINE PER 5 UNITS: Performed by: STUDENT IN AN ORGANIZED HEALTH CARE EDUCATION/TRAINING PROGRAM

## 2025-01-29 PROCEDURE — 63710000001 INSULIN LISPRO (HUMAN) PER 5 UNITS: Performed by: INTERNAL MEDICINE

## 2025-01-29 PROCEDURE — 80048 BASIC METABOLIC PNL TOTAL CA: CPT | Performed by: INTERNAL MEDICINE

## 2025-01-29 PROCEDURE — 99232 SBSQ HOSP IP/OBS MODERATE 35: CPT | Performed by: INTERNAL MEDICINE

## 2025-01-29 PROCEDURE — 99024 POSTOP FOLLOW-UP VISIT: CPT | Performed by: NURSE PRACTITIONER

## 2025-01-29 PROCEDURE — 82948 REAGENT STRIP/BLOOD GLUCOSE: CPT | Performed by: INTERNAL MEDICINE

## 2025-01-29 PROCEDURE — 94669 MECHANICAL CHEST WALL OSCILL: CPT

## 2025-01-29 PROCEDURE — 82948 REAGENT STRIP/BLOOD GLUCOSE: CPT

## 2025-01-29 PROCEDURE — 63710000001 REVEFENACIN 175 MCG/3ML SOLUTION: Performed by: INTERNAL MEDICINE

## 2025-01-29 PROCEDURE — 25010000002 ENOXAPARIN PER 10 MG: Performed by: INTERNAL MEDICINE

## 2025-01-29 RX ORDER — LINEZOLID 2 MG/ML
600 INJECTION, SOLUTION INTRAVENOUS EVERY 12 HOURS SCHEDULED
Status: COMPLETED | OUTPATIENT
Start: 2025-01-29 | End: 2025-02-04

## 2025-01-29 RX ADMIN — BUMETANIDE 2 MG: 1 TABLET ORAL at 21:54

## 2025-01-29 RX ADMIN — DULOXETINE HYDROCHLORIDE 60 MG: 30 CAPSULE, DELAYED RELEASE ORAL at 08:29

## 2025-01-29 RX ADMIN — BISACODYL 5 MG: 5 TABLET, COATED ORAL at 08:30

## 2025-01-29 RX ADMIN — LINEZOLID 600 MG: 600 INJECTION, SOLUTION INTRAVENOUS at 22:15

## 2025-01-29 RX ADMIN — Medication 10 ML: at 21:56

## 2025-01-29 RX ADMIN — CARVEDILOL 25 MG: 25 TABLET, FILM COATED ORAL at 21:54

## 2025-01-29 RX ADMIN — INSULIN LISPRO 2 UNITS: 100 INJECTION, SOLUTION INTRAVENOUS; SUBCUTANEOUS at 17:30

## 2025-01-29 RX ADMIN — BUDESONIDE 0.5 MG: 0.5 INHALANT RESPIRATORY (INHALATION) at 09:14

## 2025-01-29 RX ADMIN — Medication 10 ML: at 08:29

## 2025-01-29 RX ADMIN — ENOXAPARIN SODIUM 40 MG: 100 INJECTION SUBCUTANEOUS at 21:55

## 2025-01-29 RX ADMIN — BUMETANIDE 2 MG: 1 TABLET ORAL at 08:31

## 2025-01-29 RX ADMIN — INSULIN LISPRO 7 UNITS: 100 INJECTION, SOLUTION INTRAVENOUS; SUBCUTANEOUS at 17:30

## 2025-01-29 RX ADMIN — TAMSULOSIN HYDROCHLORIDE 0.4 MG: 0.4 CAPSULE ORAL at 08:30

## 2025-01-29 RX ADMIN — HYDROCODONE BITARTRATE AND ACETAMINOPHEN 1 TABLET: 5; 325 TABLET ORAL at 03:39

## 2025-01-29 RX ADMIN — REVEFENACIN 175 MCG: 175 SOLUTION RESPIRATORY (INHALATION) at 09:14

## 2025-01-29 RX ADMIN — BUSPIRONE HYDROCHLORIDE 15 MG: 5 TABLET ORAL at 08:30

## 2025-01-29 RX ADMIN — Medication 10 ML: at 12:56

## 2025-01-29 RX ADMIN — FINASTERIDE 5 MG: 5 TABLET, FILM COATED ORAL at 08:31

## 2025-01-29 RX ADMIN — BUDESONIDE 0.5 MG: 0.5 INHALANT RESPIRATORY (INHALATION) at 18:59

## 2025-01-29 RX ADMIN — ASPIRIN 81 MG: 81 TABLET, COATED ORAL at 06:09

## 2025-01-29 RX ADMIN — NIFEDIPINE 30 MG: 30 TABLET, FILM COATED, EXTENDED RELEASE ORAL at 06:09

## 2025-01-29 RX ADMIN — ARFORMOTEROL TARTRATE 15 MCG: 15 SOLUTION RESPIRATORY (INHALATION) at 09:14

## 2025-01-29 RX ADMIN — Medication 10 ML: at 08:31

## 2025-01-29 RX ADMIN — CARVEDILOL 25 MG: 25 TABLET, FILM COATED ORAL at 08:30

## 2025-01-29 RX ADMIN — HYDROCODONE BITARTRATE AND ACETAMINOPHEN 1 TABLET: 5; 325 TABLET ORAL at 21:54

## 2025-01-29 RX ADMIN — INSULIN LISPRO 2 UNITS: 100 INJECTION, SOLUTION INTRAVENOUS; SUBCUTANEOUS at 12:57

## 2025-01-29 RX ADMIN — Medication 10 ML: at 22:15

## 2025-01-29 RX ADMIN — FAMOTIDINE 40 MG: 20 TABLET ORAL at 06:09

## 2025-01-29 RX ADMIN — MEROPENEM 1000 MG: 1 INJECTION INTRAVENOUS at 14:35

## 2025-01-29 RX ADMIN — GUAIFENESIN 600 MG: 600 TABLET ORAL at 21:55

## 2025-01-29 RX ADMIN — INSULIN GLARGINE 25 UNITS: 100 INJECTION, SOLUTION SUBCUTANEOUS at 21:55

## 2025-01-29 RX ADMIN — FERROUS SULFATE TAB 325 MG (65 MG ELEMENTAL FE) 325 MG: 325 (65 FE) TAB at 08:31

## 2025-01-29 RX ADMIN — MEROPENEM 1000 MG: 1 INJECTION INTRAVENOUS at 01:30

## 2025-01-29 RX ADMIN — INSULIN LISPRO 2 UNITS: 100 INJECTION, SOLUTION INTRAVENOUS; SUBCUTANEOUS at 21:55

## 2025-01-29 RX ADMIN — ATORVASTATIN CALCIUM 20 MG: 10 TABLET, FILM COATED ORAL at 08:30

## 2025-01-29 RX ADMIN — ARFORMOTEROL TARTRATE 15 MCG: 15 SOLUTION RESPIRATORY (INHALATION) at 18:58

## 2025-01-29 RX ADMIN — INSULIN LISPRO 2 UNITS: 100 INJECTION, SOLUTION INTRAVENOUS; SUBCUTANEOUS at 08:32

## 2025-01-29 RX ADMIN — INSULIN LISPRO 7 UNITS: 100 INJECTION, SOLUTION INTRAVENOUS; SUBCUTANEOUS at 12:55

## 2025-01-29 RX ADMIN — TOBRAMYCIN 300 MG: 300 SOLUTION RESPIRATORY (INHALATION) at 09:14

## 2025-01-29 RX ADMIN — MONTELUKAST 10 MG: 10 TABLET, FILM COATED ORAL at 21:54

## 2025-01-29 RX ADMIN — INSULIN LISPRO 7 UNITS: 100 INJECTION, SOLUTION INTRAVENOUS; SUBCUTANEOUS at 08:30

## 2025-01-29 RX ADMIN — BUSPIRONE HYDROCHLORIDE 15 MG: 5 TABLET ORAL at 21:54

## 2025-01-29 RX ADMIN — LINEZOLID 600 MG: 600 INJECTION, SOLUTION INTRAVENOUS at 14:34

## 2025-01-29 RX ADMIN — GUAIFENESIN 600 MG: 600 TABLET ORAL at 08:31

## 2025-01-29 RX ADMIN — TOBRAMYCIN 300 MG: 300 SOLUTION RESPIRATORY (INHALATION) at 18:58

## 2025-01-29 NOTE — PROGRESS NOTES
[facility]   Hospitalist Progress Note  Date: 2025  Patient Name: Preston Wallis  : 1965  MRN: [id number]  Date of admission: 2025  Room/Bed: 360/1      Subjective   Subjective     Chief Complaint: SOB    Summary:Preston Wallis is a 59 y.o. male past medical history of COPD, hypertension, CHF, history of MDRO presents to the ED due to shortness of breath. Patient was discharged on  for MDRO pseudomonas pneumonia on IV ertapenem.     Patient admitted for shortness of breath.  Chest x-ray shows chronic bilateral lung infiltrates.  CT was performed to arrival of the chest which demonstrates new spiculated left lower lobe nodule, bronchiectasis throughout both lungs, micronodule or peribronchial densities suggestive of atypical infection, right paratracheal lymph node and enlarging pericardial effusion.  Pulmonology and cardiology services consulted.  TTE demonstrates very small pericardial effusion only, estimated EF 49%, with some grade 1 diastolic dysfunction left ventricle.  Wound care noted necrotic tissue perianal region.  CT abdomen pelvis was performed, 4.5 cm x 1.8 cm x 3.5 cm posterior anal abscess noted.  General surgery consulted, patient underwent incision and drainage .  S/p bronchoscopy 2025, BAL growing Pseudomonas.    Interval Followup: No acute overnight events.  No acute distress.  Patient resting comfortably in bed today, no family at bedside.  He reports that his respiratory status is much improved.  Asked some questions regarding his possible diverting colostomy planned for later this week.  Answered all question concerns.      Objective   Objective     Vitals:   Temp:  [96.7 °F (35.9 °C)-97.7 °F (36.5 °C)] 97.7 °F (36.5 °C)  Heart Rate:  [82-88] 82  Resp:  [18-20] 18  BP: (142-165)/(52-78) 151/58  Flow (L/min) (Oxygen Therapy):  [3-4] 3    Physical Exam   Gen: NAD, Alert and Oriented  Pulm: Nasal cannula in place, no respiratory distress  Abd: soft,  nondistended  Extremities: no pitting edema    Result Review    Result Review:  I have personally reviewed these results:  [x]  Laboratory      Lab 01/29/25  0417 01/27/25  0549 01/25/25  0349 01/24/25  0534 01/23/25  1735   WBC 16.99* 14.02* 16.29* 16.89*  --    HEMOGLOBIN 7.2* 7.4* 7.9* 8.6*  --    HEMATOCRIT 24.2* 24.9* 26.2* 29.3*  --    PLATELETS 313 383 423 405  --    NEUTROS ABS 13.05* 11.82* 14.57* 15.43*  --    IMMATURE GRANS (ABS) 1.06* 0.38* 0.16* 0.18*  --    LYMPHS ABS 1.43 0.71 0.74 0.93  --    MONOS ABS 1.41* 1.10* 0.81 0.33  --    EOS ABS 0.00 0.00 0.00 0.00  --    MCV 90.6 90.9 90.3 92.4  --    SED RATE  --   --   --  102*  --    CRP  --   --   --  7.03*  --    LACTATE  --   --   --   --  0.9         Lab 01/29/25  0417 01/28/25  0303 01/27/25  0549   SODIUM 137 139 141   POTASSIUM 4.4 4.8 4.7   CHLORIDE 99 100 102   CO2 30.3* 29.4* 29.4*   ANION GAP 7.7 9.6 9.6   BUN 98* 100* 90*   CREATININE 2.39* 2.54* 2.22*   EGFR 30.5* 28.3* 33.3*   GLUCOSE 263* 388* 389*   CALCIUM 7.9* 7.8* 7.9*         Lab 01/23/25  1614   TOTAL PROTEIN 7.9   ALBUMIN 3.1*   GLOBULIN 4.8   ALT (SGPT) 42*   AST (SGOT) 49*   BILIRUBIN 0.2   ALK PHOS 200*         Lab 01/24/25  0534 01/23/25  1735 01/23/25  1614   PROBNP  --   --  1,193.0*   HSTROP T 77* 92* 94*         Lab 01/25/25  0349   CHOLESTEROL 116   LDL CHOL 50   HDL CHOL 54   TRIGLYCERIDES 49             Lab 01/23/25  2036   PH, ARTERIAL 7.315*   PCO2, ARTERIAL 54.1*   PO2 ART 94.8   O2 SATURATION ART 96.5   FIO2 32   HCO3 ART 27.5*   BASE EXCESS ART 0.8     Brief Urine Lab Results  (Last result in the past 365 days)        Color   Clarity   Blood   Leuk Est   Nitrite   Protein   CREAT   Urine HCG        01/23/25 1912 Yellow   Turbid   Large (3+)   Moderate (2+)   Negative   >=300 mg/dL (3+)                 [x]  Microbiology   Microbiology Results (last 10 days)       Procedure Component Value - Date/Time    AFB Culture - Lavage, Lung, Right Upper Lobe [[id number]]  Collected: 01/28/25 0847    Lab Status: Preliminary result Specimen: Lavage from Lung, Right Upper Lobe Updated: 01/29/25 1216     AFB Stain No acid fast bacilli seen on direct smear      No acid fast bacilli seen on concentrated smear    BAL Culture, Quantitative - Lavage, Lung, Right Upper Lobe [[id number]] Collected: 01/28/25 0847    Lab Status: Preliminary result Specimen: Lavage from Lung, Right Upper Lobe Updated: 01/29/25 1022     BAL Culture No growth     Gram Stain Moderate (3+) WBCs seen      Rare (1+) Gram positive cocci in clusters    Pneumonia Panel - Lavage, Lung, Right Upper Lobe [[id number]]  (Abnormal) Collected: 01/28/25 0847    Lab Status: Final result Specimen: Lavage from Lung, Right Upper Lobe Updated: 01/28/25 1120     Escherichia coli PCR Not Detected     Acinetobacter calcoaceticus-baumannii complex PCR Not Detected     Enterobacter cloacae PCR Not Detected     Klebsiella oxytoca PCR Not Detected     Klebsiella pneumoniae group PCR Not Detected     Klebsiella aerogenes PCR Not Detected     Moraxella catarrhalis PCR Not Detected     Proteus species PCR Not Detected     Pseudomonas aeroginosa PCR Detected     Comment: >=10^7 Bin copies/mL        Serratia marcescens PCR Not Detected     Staphylococcus aureus PCR Not Detected     Streptococcus pyogenes PCR Not Detected     Haemophilus influenzae PCR Not Detected     Streptococcus agalactiae PCR Not Detected     Streptococcus pneumoniae PCR Not Detected     Chlamydophila pneumoniae PCR Not Detected     Legionella pneumophilia PCR Not Detected     Mycoplasma pneumo by PCR Not Detected     ADENOVIRUS, PCR Not Detected     CTX-M Gene Not Detected     IMP Gene Not Detected     KPC Gene Not Detected     mecA/C and MREJ Gene N/A     NDM Gene Not Detected     OXA-48-like Gene N/A     VIM Gene Not Detected     Coronavirus Not Detected     Human Metapneumovirus Not Detected     Human Rhinovirus/Enterovirus Not Detected     Influenza A PCR Not Detected      Influenza B PCR Not Detected     RSV, PCR Not Detected     Parainfluenza virus PCR Not Detected    Wound Culture - Surgical Site, Perirectal Abscess [[id number]]  (Abnormal)  (Susceptibility) Collected: 01/26/25 1629    Lab Status: Preliminary result Specimen: Surgical Site from Perirectal Abscess Updated: 01/29/25 1035     Wound Culture Light growth (2+) Enterococcus faecium, VRE     Comment:   Vancomycin Resistant Enterococcus species. Patient may be an isolation risk.         Scant growth (1+) Gram Negative Bacilli     Gram Stain Few (2+) WBCs seen      Rare (1+) Gram positive cocci in pairs and chains    Susceptibility        Enterococcus faecium, VRE      MARIO      Ampicillin Resistant      Linezolid Susceptible      Vancomycin Resistant                           AFB Culture - Surgical Site, Perirectal Abscess [[id number]] Collected: 01/26/25 1629    Lab Status: Preliminary result Specimen: Surgical Site from Perirectal Abscess Updated: 01/27/25 1358     AFB Stain No acid fast bacilli seen    Respiratory Culture - Sputum, Cough [[id number]]  (Abnormal) Collected: 01/24/25 1618    Lab Status: Preliminary result Specimen: Sputum from Cough Updated: 01/28/25 0952     Respiratory Culture Moderate growth (3+) Pseudomonas aeruginosa      Scant growth (1+) Normal Respiratory Nola     Gram Stain Few (2+) WBCs seen      Rare (1+) Gram positive cocci      Rare (1+) Gram negative bacilli    Narrative:      Sending to [facility] for [facility] 1/28/25      AFB Culture - Sputum, Cough [[id number]] Collected: 01/24/25 1618    Lab Status: Preliminary result Specimen: Sputum from Cough Updated: 01/27/25 1402     AFB Stain No acid fast bacilli seen on direct smear      No acid fast bacilli seen on concentrated smear    S. Pneumo Ag Urine or CSF - Urine, Indwelling Urethral Catheter [[id number]]  (Normal) Collected: 01/23/25 1912    Lab Status: Final result Specimen: Urine from Indwelling Urethral Catheter Updated: 01/24/25 1646     Strep Pneumo  Ag Negative    Legionella Antigen, Urine - Urine, Indwelling Urethral Catheter [[id number]]  (Normal) Collected: 01/23/25 1912    Lab Status: Final result Specimen: Urine from Indwelling Urethral Catheter Updated: 01/24/25 1646     LEGIONELLA ANTIGEN, URINE Negative    Blood Culture - Blood, Arm, Right [[id number]]  (Normal) Collected: 01/23/25 1748    Lab Status: Final result Specimen: Blood from Arm, Right Updated: 01/28/25 1800     Blood Culture No growth at 5 days    Narrative:      Less than seven (7) mL's of blood was collected.  Insufficient quantity may yield false negative results.    Blood Culture - Blood, Hand, Left [[id number]]  (Normal) Collected: 01/23/25 1735    Lab Status: Final result Specimen: Blood from Hand, Left Updated: 01/28/25 1745     Blood Culture No growth at 5 days    Narrative:      Less than seven (7) mL's of blood was collected.  Insufficient quantity may yield false negative results.    COVID-19, FLU A/B, RSV PCR 1 HR TAT - Swab, Nasopharynx [[id number]]  (Normal) Collected: 01/23/25 1723    Lab Status: Final result Specimen: Swab from Nasopharynx Updated: 01/23/25 1806     COVID19 Not Detected     Influenza A PCR Not Detected     Influenza B PCR Not Detected     RSV, PCR Not Detected    Narrative:      Fact sheet for providers: https://www.fda.gov/media/471094/download    Fact sheet for patients: https://www.fda.gov/media/423356/download    Test performed by PCR.          [x]  Radiology  CT Abdomen Pelvis Without Contrast    Result Date: 1/25/2025  Impression: 1.There is a posterior perianal abscess as detailed above. No intrapelvic extension. 2.Cardiomegaly with relatively small pericardial effusion. There is bibasilar atelectasis with trace pleural effusions. 3.Other incidental nonemergent findings as detailed above. Electronically Signed: Rob Milner MD  1/25/2025 10:55 AM EST  Workstation ID: [id number]    CT Chest Without Contrast Diagnostic    Result Date: 1/24/2025  Impression:  1.There is a new somewhat spiculated nodular focus in the superior left lower lobe image #66 of series. The rapid interval development suggests an infectious or inflammatory process. 3-month follow-up CT recommended per Fleischner guidelines. 2.Stable appearance of verrucous and cystic bronchiectasis involving all lobes of the lungs, but greatest in the right upper lobe and lower lobes. There are again adjacent micronodular densities in a peribronchial distribution suggesting an infectious or  inflammatory process. Nontuberculous mycobacterial infection or other atypical agent would be a top considerations. 3.Small bilateral pleural effusions, slightly increased from prior. There is consolidation in the lung bases bilaterally which may be due to atelectasis or additional infiltrates. 4.Increasing pericardial effusion, now measuring up to 2.6 cm posteriorly on the right. 5.Enlarged right paratracheal lymph node measuring up to 1.4 cm, likely reactive. Hilar adenopathy is also suspected, but not well characterized due to noncontrast technique. 6.Additional findings as given above. Electronically Signed: Deshawn Soto MD  1/24/2025 9:43 AM EST  Workstation ID: [id number]    XR Chest 1 View    Result Date: 1/23/2025  Impression: Patchy chronic bilateral lung infiltrates. No acute infiltrate. Electronically Signed: Amado Salmeron MD  1/23/2025 5:11 PM EST  Workstation ID: [id number]   []  EKG/Telemetry   []  Cardiology/Vascular   []  Pathology  []  Old records  []  Other:    Assessment & Plan   Assessment / Plan     Assessment:  Acute on chronic hypoxic respiratory failure  MDRO Pseudomonas pneumonia  Cystic bronchiectasis with acute exacerbation  Complicated UTI  Type 2 diabetes with hyperglycemia  CKD stage IIIa  COPD, acute exacerbation  Atrial fibrillation on Eliquis  Elevated troponin secondary to demand ischemia  HFpEF, not in acute exacerbation  History of BPH  Chronic left heel ulcer  Obstructive sleep apnea  Perianal  abscess s/p I&D 2/2 Enterococcus VRE    Plan:  Continue inpatient admission  Pulmonology following.  S/p bronchoscopy 1/28/2025.  BAL positive for Pseudomonas.  Other micro pending.  Continue IV meropenem and MOIZ nebs for Pseudomonas pneumonia  Discontinue IV vancomycin.  Wound culture grew Enterococcus VRE.  Start IV linezolid 600 mg twice daily.  Hold trazodone and duloxetine.  Continue BuSpar.  Continue Brovana, Pulmicort, Yupelri  Glucose improved.  Continue Lantus 25 units at bedtime.  Continue medium dose sliding scale.  Continue scheduled 7 units with meals.  Creatinine stable  General Surgery following.  S/p I&D perianal abscess 1/26/2025.  Planning for diverting colostomy, tomorrow?  Continue scheduled MiraLAX and stool softeners.  WBC up today.  Could be reactive from bronchoscopy yesterday or an adequate antibiotic coverage given VRE and abscess.  Cardiology following.  Eliquis currently on hold for procedure.  Continue chronic indwelling Doyle catheter  A.m. labs       Discussed with RN.    VTE Prophylaxis:  Pharmacologic VTE prophylaxis orders are present.        CODE STATUS:   Code Status (Patient has no pulse and is not breathing): CPR (Attempt to Resuscitate)  Medical Interventions (Patient has pulse or is breathing): Full Support      Electronically signed by Yolanda Cuevas DO, 1/29/2025, 12:49 EST.

## 2025-01-29 NOTE — PROGRESS NOTES
Pulmonary / Critical Care Progress Note      Patient Name: Preston Wallis  : 1965  MRN: [id number]  Attending:  Yolanda Cuevas*  Date of admission: 2025    Subjective   Subjective   Follow-up for acute on chronic hypoxic respiratory failure    Feels better after bronchoscopy  Review of Systems  General: Denied complaints  Cardiovascular:  Denied complaints  Respiratory: + Dyspnea on exertion; denied complaints  Gastrointestinal: Denied complaints        Objective   Objective     Vitals:   Temp:  [96.7 °F (35.9 °C)-98.1 °F (36.7 °C)] 98.1 °F (36.7 °C)  Heart Rate:  [82-88] 85  Resp:  [18-20] 18  BP: (142-165)/(52-72) 154/52  Flow (L/min) (Oxygen Therapy):  [3-4] 3    Physical Exam   Chronically ill-appearing male  Resting comfortably  Scattered crackles      Result Review    Result Review:  I have personally reviewed the results from the time of this admission to 2025 17:33 EST and agree with these findings:  []  Laboratory  []  Microbiology  []  Radiology  []  EKG/Telemetry   []  Cardiology/Vascular   []  Pathology  []  Old records  []  Other:  Most notable findings include:   -     Assessment & Plan   Assessment / Plan     Active Hospital Problems:  Active Hospital Problems    Diagnosis    • **PNA (pneumonia)    • 1. Incision and drainage of posterior perianal abscess 2. Sharp excisional debridement through skin and subcutaneous tissue in the posterior perianal area, 9 x 6 x 1 cm    • Perianal abscess    • Wound of gluteal cleft    • Pneumonia due to Pseudomonas species    • Bronchiectasis without complication    • COPD exacerbation          Impression:  Cystic bronchiectasis with acute exacerbation  History of MDR Pseudomonas    Plan:  Much better after bronchoscopy  Continue airway clearance  Continue IV antibiotics  Continue oxygen therapy  Continue LABA LAMA and corticosteroids  We will follow-up on results of cultures  Is growing heavy growth of Pseudomonas  Cultures and sensitivity  will be followed up on when they are returned  VTE Prophylaxis:  Pharmacologic VTE prophylaxis orders are present.        CODE STATUS:   Code Status (Patient has no pulse and is not breathing): CPR (Attempt to Resuscitate)  Medical Interventions (Patient has pulse or is breathing): Full Support      Labs, images, and medications personally reviewed.  Discussed with patient    Electronically signed by Walter Nicole DO, 01/29/25, 5:33 PM EST.

## 2025-01-29 NOTE — SIGNIFICANT NOTE
" Wound Eval / Progress Noted    YAIMA [place]     Patient Name: Preston Wallis  : 1965  MRN: [id number]  Today's Date: 2025                 Admit Date: 2025    Visit Dx:    ICD-10-CM ICD-9-CM   1. Pneumonia due to Pseudomonas species, unspecified laterality, unspecified part of lung  J15.1 482.1   2. Urinary tract infection associated with indwelling urethral catheter, initial encounter  T83.511A 996.64    N39.0 599.0   3. Perianal abscess  K61.0 566   4. COPD exacerbation  J44.1 491.21   5. Bronchiectasis without complication  J47.9 494.0   6. Allergy, initial encounter  T78.40XA 995.3   7. Acute hypoxic on chronic hypercapnic respiratory failure  J96.01 518.84    J96.12    8. Tobacco abuse, in remission  F17.201 305.1   9. Chronic dyspnea  R06.09 786.09   10. Obstructive sleep apnea  G47.33 327.23   11. Chronic respiratory failure with hypoxia and hypercapnia  J96.11 518.83    J96.12 799.02     786.09   12. Chronic obstructive pulmonary disease, unspecified COPD type  J44.9 496   13. Wound of gluteal cleft, unspecified laterality, subsequent encounter  S31.809D V58.89     877.0         PNA (pneumonia)    COPD exacerbation    Bronchiectasis without complication    Pneumonia due to Pseudomonas species    Perianal abscess    1. Incision and drainage of posterior perianal abscess 2. Sharp excisional debridement through skin and subcutaneous tissue in the posterior perianal area, 9 x 6 x 1 cm    Wound of gluteal cleft        Past Medical History:   Diagnosis Date    1. Incision and drainage of posterior perianal abscess 2. Sharp excisional debridement through skin and subcutaneous tissue in the posterior perianal area, 9 x 6 x 1 cm 2025    Age-related cognitive decline     Allergic contact dermatitis     Allergies     Anemia     Bedbound     2023 \"MY LEG MUSCLES STOPPED WORKING\"    Bronchiectasis with acute lower respiratory infection     Charcot foot due to diabetes mellitus 09/10/2013    Chronic " "diastolic (congestive) heart failure     Chronic kidney disease     Chronic respiratory failure with hypoxia     Closed supracondylar fracture of femur 01/12/2022    COPD (chronic obstructive pulmonary disease)     Deep vein thrombosis (DVT) of lower extremity associated with air travel 01/13/2023    Dependence on supplemental oxygen     Eczema     Erectile dysfunction     due to organic reasons    Essential (primary) hypertension     Fracture     closed fracture of other tarsal and metatarsal bones    Fracture of proximal humerus 01/13/2023    GERD without esophagitis     High risk medication use     Hypercholesteremia     Hypomagnesemia     Infected stasis ulcer of left lower extremity 01/13/2023    Insomnia     Low back pain     Major depressive disorder     Morbid (severe) obesity due to excess calories     MRSA pneumonia     Muscle weakness     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 without evidence of necrosis     Obstructive sleep apnea (adult) (pediatric)     On home O2     REPORTS WEARING 2L/NC AAT    Other forms of dyspnea     Other long term (current) drug therapy     Other specified noninfective gastroenteritis and colitis     Other spondylosis, lumbar region     Pain in both knees     Paroxysmal atrial fibrillation     Peripheral neuropathy     attributed to type 2 diabetes    Pneumonia, unspecified organism     Polyneuropathy     Rash and other nonspecific skin eruption     Self-catheterizes urinary bladder     EVERY 4 HOURS    Smoking     \"SOMETIMES\"    Syncope and collapse     Tachycardia     Tinnitus 01/13/2023    Type 1 diabetes mellitus with diabetic chronic kidney disease     Type 2 diabetes mellitus     Unspecified fall, initial encounter     Urinary retention         Past Surgical History:   Procedure Laterality Date    BRONCHOSCOPY N/A 1/28/2025    Procedure: BRONCHOSCOPY: BAL: insertion of lighted instrument to view inside the lung;  Surgeon: " Walter Nicole, DO;  Location: [facility] MAIN OR;  Service: Pulmonary;  Laterality: N/A;    CARDIAC CATHETERIZATION Left 8/15/2024    Procedure: Carbon dioxide aortogram with left leg angiogram, possible angioplasty or stenting;  Surgeon: Moshe Willson MD;  Location: [facility] CATH INVASIVE LOCATION;  Service: Vascular;  Laterality: Left;    CHOLECYSTECTOMY      CYSTOSCOPY      FEMUR SURGERY Left     Shravan placed    INCISION AND DRAINAGE ABSCESS N/A 1/26/2025    Procedure: INCISION AND DRAINAGE ABSCESS; plain text: incision and drain pus from buttocks wound;  Surgeon: Emerson Canada MD;  Location: [facility] OR OSC;  Service: General;  Laterality: N/A;    KNEE SURGERY Left     OTHER SURGICAL HISTORY Left     venous port, REMOVED    PORTACATH PLACEMENT Right     TIBIAL PLATEAU OPEN REDUCTION INTERNAL FIXATION Left 12/22/2023    Procedure: TIBIAL PLATEAU OPEN REDUCTION INTERNAL FIXATION;  Surgeon: Hugo Kline MD;  Location: [facility] MAIN OR;  Service: Orthopedics;  Laterality: Left;    TONSILLECTOMY AND ADENOIDECTOMY                  Wound Check / Follow-up:  certified ostomy nurse requested to stoma site dewey patient for diverting colostomy. Patient alert and resting in bed on 3west and agreeable to visit.     Educated on colostomy, stoma, and pouch changes with practice stoma, booklet and educational box left at bedside.   Patient uses wheelchair.     Sit up in bed and noted large soft fold extending across abdomen adjacent to umbilicus up to both quadrants.   Chose sites on both lower quadrants as these were flat surfaces above the pannus fold when sitting.   Patient does have very weak rectus muscle, very difficult to palpate very soft abdomen , and firmness noted around umbilicus when asked to cough to palpate muscle.     Planned marking sites where cleansed with chloraprep swabs, allowed to dry, and marked with skin marker and covered with tegaderm.     Marked site 1 to LLQ and site 2 to RLQ       Patient was educated that the stoma may be placed wherever deemed necessary by surgeon that the markings are just suggestions based on external exam. Internal anatomy and surgical procedure will further determine actual placement of stoma. Patient voices understanding.       colostomy stoma site marking for pending colostomy next week. Very soft rectus muscle difficult to palpate along soft abdomen. Large soft fold noted along umbilicus extending across abdomen into both upper quadrants.        Ayanna Bruner RN    1/29/2025    18:38 EST

## 2025-01-29 NOTE — PROGRESS NOTES
"POST OP PROGRESS NOTE     Patient Name:  Preston Wallis  YOB: 1965  [id number]   LOS: 6 days   1 Day Post-Op            Subjective     Interval History:   VSS, afebrile, no complaints    Review of Systems:    A complete review of systems was performed and all are negative except what is documented in the HPI.       Objective     Constitutional:  well nourished, no acute distress, appears stated age /58 (BP Location: Left arm, Patient Position: Lying)   Pulse 82   Temp 97.7 °F (36.5 °C) (Oral)   Resp 18   Ht 175.3 cm (69\")   Wt 123 kg (271 lb 2.7 oz)   SpO2 93%   BMI 40.04 kg/m²    Eyes:  anicteric sclerae, moist conjunctivae, no lid lag, PERRLA  ENMT:  oropharynx clear, moist mucous membranes  Neck:   full ROM, trachea midline  Cardiovascular: RRR, S1 and S2 present, no MRG, heart rate 82, no pedal edema  Respiratory: lungs CTA, respirations even and unlabored  GI:  Abdomen soft, nontender, nondistended     Skin:  warm and dry, normal turgor, no rashes  Psychiatric:  alert and oriented x 4, intact judgment and insight, cooperative          Results Review:       I reviewed the patient's new clinical results including  CBC, BMP.     WBC   Date Value Ref Range Status   01/29/2025 16.99 (H) 3.40 - 10.80 10*3/mm3 Final     RBC   Date Value Ref Range Status   01/29/2025 2.67 (L) 4.14 - 5.80 10*6/mm3 Final     Hemoglobin   Date Value Ref Range Status   01/29/2025 7.2 (L) 13.0 - 17.7 g/dL Final     Hematocrit   Date Value Ref Range Status   01/29/2025 24.2 (L) 37.5 - 51.0 % Final     MCV   Date Value Ref Range Status   01/29/2025 90.6 79.0 - 97.0 fL Final     MCH   Date Value Ref Range Status   01/29/2025 27.0 26.6 - 33.0 pg Final     MCHC   Date Value Ref Range Status   01/29/2025 29.8 (L) 31.5 - 35.7 g/dL Final     RDW   Date Value Ref Range Status   01/29/2025 13.7 12.3 - 15.4 % Final     RDW-SD   Date Value Ref Range Status   01/29/2025 45.3 37.0 - 54.0 fl Final     MPV   Date Value Ref Range " "Status   01/29/2025 9.1 6.0 - 12.0 fL Final     Platelets   Date Value Ref Range Status   01/29/2025 313 140 - 450 10*3/mm3 Final     Neutrophil %   Date Value Ref Range Status   01/29/2025 76.9 (H) 42.7 - 76.0 % Final     Lymphocyte %   Date Value Ref Range Status   01/29/2025 8.4 (L) 19.6 - 45.3 % Final     Monocyte %   Date Value Ref Range Status   01/29/2025 8.3 5.0 - 12.0 % Final     Eosinophil %   Date Value Ref Range Status   01/29/2025 0.0 (L) 0.3 - 6.2 % Final     Basophil %   Date Value Ref Range Status   01/29/2025 0.2 0.0 - 1.5 % Final     Immature Grans %   Date Value Ref Range Status   01/29/2025 6.2 (H) 0.0 - 0.5 % Final     Neutrophils, Absolute   Date Value Ref Range Status   01/29/2025 13.05 (H) 1.70 - 7.00 10*3/mm3 Final     Lymphocytes, Absolute   Date Value Ref Range Status   01/29/2025 1.43 0.70 - 3.10 10*3/mm3 Final     Monocytes, Absolute   Date Value Ref Range Status   01/29/2025 1.41 (H) 0.10 - 0.90 10*3/mm3 Final     Eosinophils, Absolute   Date Value Ref Range Status   01/29/2025 0.00 0.00 - 0.40 10*3/mm3 Final     Basophils, Absolute   Date Value Ref Range Status   01/29/2025 0.04 0.00 - 0.20 10*3/mm3 Final     Immature Grans, Absolute   Date Value Ref Range Status   01/29/2025 1.06 (H) 0.00 - 0.05 10*3/mm3 Final     nRBC   Date Value Ref Range Status   01/29/2025 0.3 (H) 0.0 - 0.2 /100 WBC Final         Basic Metabolic Panel    Sodium Sodium   Date Value Ref Range Status   01/29/2025 137 136 - 145 mmol/L Final   01/28/2025 139 136 - 145 mmol/L Final   01/27/2025 141 136 - 145 mmol/L Final      Potassium Potassium   Date Value Ref Range Status   01/29/2025 4.4 3.5 - 5.2 mmol/L Final   01/28/2025 4.8 3.5 - 5.2 mmol/L Final   01/27/2025 4.7 3.5 - 5.2 mmol/L Final      Chloride Chloride   Date Value Ref Range Status   01/29/2025 99 98 - 107 mmol/L Final   01/28/2025 100 98 - 107 mmol/L Final   01/27/2025 102 98 - 107 mmol/L Final      Bicarbonate No results found for: \"PLASMABICARB\"   BUN " "BUN   Date Value Ref Range Status   01/29/2025 98 (H) 6 - 20 mg/dL Final   01/28/2025 100 (H) 6 - 20 mg/dL Final   01/27/2025 90 (H) 6 - 20 mg/dL Final      Creatinine Creatinine   Date Value Ref Range Status   01/29/2025 2.39 (H) 0.76 - 1.27 mg/dL Final   01/28/2025 2.54 (H) 0.76 - 1.27 mg/dL Final   01/27/2025 2.22 (H) 0.76 - 1.27 mg/dL Final      Calcium Calcium   Date Value Ref Range Status   01/29/2025 7.9 (L) 8.6 - 10.5 mg/dL Final   01/28/2025 7.8 (L) 8.6 - 10.5 mg/dL Final   01/27/2025 7.9 (L) 8.6 - 10.5 mg/dL Final      Glucose      No components found for: \"GLUCOSE.*\"       Lab Results   Component Value Date    GLUCOSE 263 (H) 01/29/2025    BUN 98 (H) 01/29/2025    CREATININE 2.39 (H) 01/29/2025     01/29/2025    K 4.4 01/29/2025    CL 99 01/29/2025    CALCIUM 7.9 (L) 01/29/2025    PROTEINTOT 7.9 01/23/2025    ALBUMIN 3.1 (L) 01/23/2025    ALT 42 (H) 01/23/2025    AST 49 (H) 01/23/2025    ALKPHOS 200 (H) 01/23/2025    BILITOT 0.2 01/23/2025    GLOB 4.8 01/23/2025    AGRATIO 0.6 01/23/2025    BCR 41.0 (H) 01/29/2025    ANIONGAP 7.7 01/29/2025    EGFR 30.5 (L) 01/29/2025       IMAGING:  Imaging Results (Last 72 Hours)       ** No results found for the last 72 hours. **            Medications:    Current Facility-Administered Medications:     [Held by provider] apixaban (ELIQUIS) tablet 5 mg, 5 mg, Oral, Q12H, Walter Nicole DO, 5 mg at 01/25/25 2141    arformoterol (BROVANA) nebulizer solution 15 mcg, 15 mcg, Nebulization, BID - RT, Walter Nicole DO, 15 mcg at 01/29/25 0914    aspirin EC tablet 81 mg, 81 mg, Oral, QAM, Walter Nicole DO, 81 mg at 01/29/25 0609    atorvastatin (LIPITOR) tablet 20 mg, 20 mg, Oral, Daily, Walter Nicole DO, 20 mg at 01/29/25 0830    sennosides-docusate (PERICOLACE) 8.6-50 MG per tablet 2 tablet, 2 tablet, Oral, BID PRN, 2 tablet at 01/28/25 2054 **AND** polyethylene glycol (MIRALAX) packet 17 g, 17 g, Oral, Daily, 17 g at " 01/28/25 1532 **AND** bisacodyl (DULCOLAX) EC tablet 5 mg, 5 mg, Oral, Daily, 5 mg at 01/29/25 0830 **AND** bisacodyl (DULCOLAX) suppository 10 mg, 10 mg, Rectal, Daily PRN, Yolanda Cuevas DO    budesonide (PULMICORT) nebulizer solution 0.5 mg, 0.5 mg, Nebulization, BID, Walter Nicole DO, 0.5 mg at 01/29/25 0914    bumetanide (BUMEX) tablet 2 mg, 2 mg, Oral, BID, Walter Nicole DO, 2 mg at 01/29/25 0831    busPIRone (BUSPAR) tablet 15 mg, 15 mg, Oral, BID, Walter Nicole DO, 15 mg at 01/29/25 0830    carvedilol (COREG) tablet 25 mg, 25 mg, Oral, Q12H, Walter Nicole DO, 25 mg at 01/29/25 0830    dextrose (D50W) (25 g/50 mL) IV injection 25 g, 25 g, Intravenous, Q15 Min PRN, Walter Nicole DO    dextrose (GLUTOSE) oral gel 15 g, 15 g, Oral, Q15 Min PRN, Walter Nicole DO    Enoxaparin Sodium (LOVENOX) syringe 40 mg, 40 mg, Subcutaneous, Nightly, Walter Nicole DO, 40 mg at 01/28/25 2048    famotidine (PEPCID) tablet 40 mg, 40 mg, Oral, QAM, Walter Nicole DO, 40 mg at 01/29/25 0609    ferrous sulfate tablet 325 mg, 325 mg, Oral, Daily With Breakfast, Walter Nicole DO, 325 mg at 01/29/25 0831    finasteride (PROSCAR) tablet 5 mg, 5 mg, Oral, Daily, Walter Nicole DO, 5 mg at 01/29/25 0831    glucagon (GLUCAGEN) injection 1 mg, 1 mg, Intramuscular, Q15 Min PRN, Walter Nicole DO    guaiFENesin (MUCINEX) 12 hr tablet 600 mg, 600 mg, Oral, Q12H, Walter Nicole, , 600 mg at 01/29/25 0831    heparin injection 500 Units, 5 mL, Intravenous, PRN, Walter Nicole,     HYDROcodone-acetaminophen (NORCO) 5-325 MG per tablet 1 tablet, 1 tablet, Oral, Q6H PRN, Walter Nicole, , 1 tablet at 01/29/25 0339    HYDROmorphone (DILAUDID) injection 0.5 mg, 0.5 mg, Intravenous, Q4H PRN, Walter Nicole,     insulin glargine (LANTUS, SEMGLEE)  injection 25 Units, 25 Units, Subcutaneous, Nightly, Yolanda Cuevas, , 25 Units at 01/28/25 2047    Insulin Lispro (humaLOG) injection 2-9 Units, 2-9 Units, Subcutaneous, 4x Daily AC & at Bedtime, Yolanda Cuevas DO, 2 Units at 01/29/25 1257    Insulin Lispro (humaLOG) injection 7 Units, 7 Units, Subcutaneous, TID With Meals, Walter Nicole, , 7 Units at 01/29/25 1255    ipratropium-albuterol (DUO-NEB) nebulizer solution 3 mL, 3 mL, Nebulization, Q4H PRN, Walter Nicole DO, 3 mL at 01/28/25 1535    Linezolid (ZYVOX) 600 mg 300 mL, 600 mg, Intravenous, Q12H, Yolanda Cuevas,     meropenem (MERREM) 1,000 mg in sodium chloride 0.9 % 100 mL IVPB-VTB, 1,000 mg, Intravenous, Q12H, Walter Nicole DO, 1,000 mg at 01/29/25 0130    montelukast (SINGULAIR) tablet 10 mg, 10 mg, Oral, Nightly, Walter Nicole DO, 10 mg at 01/28/25 2047    NIFEdipine XL (PROCARDIA XL) 24 hr tablet 30 mg, 30 mg, Oral, QAM, Walter Nicole, , 30 mg at 01/29/25 0609    nitroglycerin (NITROSTAT) SL tablet 0.4 mg, 0.4 mg, Sublingual, Q5 Min PRN, Walter Nicole DO    revefenacin (YUPELRI) nebulizer solution 175 mcg, 175 mcg, Nebulization, Daily - RT, Walter Nicole DO, 175 mcg at 01/29/25 0914    sodium chloride 0.9 % flush 10 mL, 10 mL, Intravenous, PRN, Walter Nicole DO, 10 mL at 01/24/25 1849    sodium chloride 0.9 % flush 10 mL, 10 mL, Intravenous, Q12H, Walter Nicole DO, 10 mL at 01/29/25 1256    sodium chloride 0.9 % flush 10 mL, 10 mL, Intravenous, PRN, Walter Nicole, DO    sodium chloride 0.9 % flush 10 mL, 10 mL, Intravenous, Q12H, Walter Nicole, DO, 10 mL at 01/29/25 0829    sodium chloride 0.9 % flush 10 mL, 10 mL, Intravenous, PRN, Walter Nicole, DO    sodium chloride 0.9 % flush 20 mL, 20 mL, Intravenous, PRN, Walter Nicole, DO    sodium chloride 0.9 %  infusion 40 mL, 40 mL, Intravenous, PRN, Walter Nicole,     sodium chloride 0.9 % infusion 40 mL, 40 mL, Intravenous, PRN, Walter Nicole DO    tamsulosin (FLOMAX) 24 hr capsule 0.4 mg, 0.4 mg, Oral, Daily, Walter Nicole DO, 0.4 mg at 01/29/25 0830    tobramycin PF (MOIZ) nebulizer solution 300 mg, 300 mg, Nebulization, BID - RT, Walter Nicole DO, 300 mg at 01/29/25 0914    Assessment & Plan       PNA (pneumonia)    COPD exacerbation    Bronchiectasis without complication    Pneumonia due to Pseudomonas species    Perianal abscess    1. Incision and drainage of posterior perianal abscess 2. Sharp excisional debridement through skin and subcutaneous tissue in the posterior perianal area, 9 x 6 x 1 cm    Wound of gluteal cleft     Surgery for tomorrow cancelled due to surgeon illness   Plan for colostomy placement sometime  next week          Electronically signed by CON Cruz, 01/29/25, 2:15 PM EST.

## 2025-01-29 NOTE — PROGRESS NOTES
[facility] Clinical Pharmacy Services: Vancomycin Monitoring Note    Preston Wallis is a 59 y.o. male who is on day 3/7 of pharmacy to dose vancomycin for Skin and Soft Tissue.    Previous Vancomycin Dose:   1250 mg IV x1 1/28 @ 1224  Imaging Reviewed?: Yes  Updated Cultures and Sensitivities:   Microbiology Results (last 10 days)       Procedure Component Value - Date/Time    AFB Culture - Lavage, Lung, Right Upper Lobe [[id number]] Collected: 01/28/25 0847    Lab Status: Preliminary result Specimen: Lavage from Lung, Right Upper Lobe Updated: 01/28/25 1451     AFB Stain No acid fast bacilli seen on direct smear    BAL Culture, Quantitative - Lavage, Lung, Right Upper Lobe [[id number]] Collected: 01/28/25 0847    Lab Status: Preliminary result Specimen: Lavage from Lung, Right Upper Lobe Updated: 01/28/25 1021     Gram Stain Moderate (3+) WBCs seen      Rare (1+) Gram positive cocci in clusters    Pneumonia Panel - Lavage, Lung, Right Upper Lobe [[id number]]  (Abnormal) Collected: 01/28/25 0847    Lab Status: Final result Specimen: Lavage from Lung, Right Upper Lobe Updated: 01/28/25 1120     Escherichia coli PCR Not Detected     Acinetobacter calcoaceticus-baumannii complex PCR Not Detected     Enterobacter cloacae PCR Not Detected     Klebsiella oxytoca PCR Not Detected     Klebsiella pneumoniae group PCR Not Detected     Klebsiella aerogenes PCR Not Detected     Moraxella catarrhalis PCR Not Detected     Proteus species PCR Not Detected     Pseudomonas aeroginosa PCR Detected     Comment: >=10^7 Bin copies/mL        Serratia marcescens PCR Not Detected     Staphylococcus aureus PCR Not Detected     Streptococcus pyogenes PCR Not Detected     Haemophilus influenzae PCR Not Detected     Streptococcus agalactiae PCR Not Detected     Streptococcus pneumoniae PCR Not Detected     Chlamydophila pneumoniae PCR Not Detected     Legionella pneumophilia PCR Not Detected     Mycoplasma pneumo by PCR Not Detected      ADENOVIRUS, PCR Not Detected     CTX-M Gene Not Detected     IMP Gene Not Detected     KPC Gene Not Detected     mecA/C and MREJ Gene N/A     NDM Gene Not Detected     OXA-48-like Gene N/A     VIM Gene Not Detected     Coronavirus Not Detected     Human Metapneumovirus Not Detected     Human Rhinovirus/Enterovirus Not Detected     Influenza A PCR Not Detected     Influenza B PCR Not Detected     RSV, PCR Not Detected     Parainfluenza virus PCR Not Detected    Wound Culture - Surgical Site, Perirectal Abscess [[id number]]  (Abnormal) Collected: 01/26/25 1629    Lab Status: Preliminary result Specimen: Surgical Site from Perirectal Abscess Updated: 01/28/25 0734     Wound Culture Scant growth (1+) Gram Negative Bacilli     Gram Stain Few (2+) WBCs seen      Rare (1+) Gram positive cocci in pairs and chains    AFB Culture - Surgical Site, Perirectal Abscess [[id number]] Collected: 01/26/25 1629    Lab Status: Preliminary result Specimen: Surgical Site from Perirectal Abscess Updated: 01/27/25 1358     AFB Stain No acid fast bacilli seen    Respiratory Culture - Sputum, Cough [[id number]]  (Abnormal) Collected: 01/24/25 1618    Lab Status: Preliminary result Specimen: Sputum from Cough Updated: 01/28/25 0952     Respiratory Culture Moderate growth (3+) Pseudomonas aeruginosa      Scant growth (1+) Normal Respiratory Nola     Gram Stain Few (2+) WBCs seen      Rare (1+) Gram positive cocci      Rare (1+) Gram negative bacilli    Narrative:      [facility]/28/25      AFB Culture - Sputum, Cough [[id number]] Collected: 01/24/25 1618    Lab Status: Preliminary result Specimen: Sputum from Cough Updated: 01/27/25 1402     AFB Stain No acid fast bacilli seen on direct smear      No acid fast bacilli seen on concentrated smear    S. Pneumo Ag Urine or CSF - Urine, Indwelling Urethral Catheter [[id number]]  (Normal) Collected: 01/23/25 1912    Lab Status: Final result Specimen: Urine from Indwelling Urethral  "Catheter Updated: 25 1646     Strep Pneumo Ag Negative    Legionella Antigen, Urine - Urine, Indwelling Urethral Catheter [[id number]]  (Normal) Collected: 25 1912    Lab Status: Final result Specimen: Urine from Indwelling Urethral Catheter Updated: 25 1646     LEGIONELLA ANTIGEN, URINE Negative    Blood Culture - Blood, Arm, Right [[id number]]  (Normal) Collected: 25 1748    Lab Status: Final result Specimen: Blood from Arm, Right Updated: 25 1800     Blood Culture No growth at 5 days    Narrative:      Less than seven (7) mL's of blood was collected.  Insufficient quantity may yield false negative results.    Blood Culture - Blood, Hand, Left [[id number]]  (Normal) Collected: 25 1735    Lab Status: Final result Specimen: Blood from Hand, Left Updated: 25 1745     Blood Culture No growth at 5 days    Narrative:      Less than seven (7) mL's of blood was collected.  Insufficient quantity may yield false negative results.    COVID-19, FLU A/B, RSV PCR 1 HR TAT - Swab, Nasopharynx [[id number]]  (Normal) Collected: 25 1723    Lab Status: Final result Specimen: Swab from Nasopharynx Updated: 25 1806     COVID19 Not Detected     Influenza A PCR Not Detected     Influenza B PCR Not Detected     RSV, PCR Not Detected    Narrative:      Fact sheet for providers: https://www.fda.gov/media/758094/download    Fact sheet for patients: https://www.fda.gov/media/965684/download    Test performed by PCR.              Vitals/Labs  Ht: 175.3 cm (69\"); Wt: 123 kg (271 lb 2.7 oz)   Temp (24hrs), Av.3 °F (36.3 °C), Min:96.7 °F (35.9 °C), Max:97.7 °F (36.5 °C)   Estimated Creatinine Clearance: 43.1 mL/min (A) (by C-G formula based on SCr of 2.39 mg/dL (H)).     Results from last 7 days   Lab Units 25  0417 25  0303 25  0549 25  0349   VANCOMYCIN RM mcg/mL 17.28 8.51  --   --    CREATININE mg/dL 2.39* 2.54* 2.22* 2.11*   WBC 10*3/mm3 16.99*  --  14.02* 16.29* "     Assessment/Plan    Current Vancomycin Dose: pulse dosing with 500mg iv x 1 today.  Next Vanc Random ordered for AM  We will continue to monitor patient changes and renal function     Thank you for involving pharmacy in this patient's care. Please contact pharmacy with any questions or concerns.    Hannah Alcantar  Clinical Pharmacist

## 2025-01-29 NOTE — CONSULTS
Consult placed per Database Referral. Met with patient and family at bedside. Discussed most recent A1c of 8.8% with an estimated average glucose of 206 mg/dl from December 2024. Patient states he is compliant with his insulin administration at home but the steroids he's been on have increased his blood sugar and he is likely requiring more insulin. Discussed adjusting doses while in the hospital and if he goes home on steroids he can continue the increased doses. Discussed with MD. Patient agreeable to plan.     Patient sees CON [facility] for his diabetes management. His next appointment is March 14th, 2025. Patient has no further needs or questions at this time. Will continue to follow during admission.

## 2025-01-29 NOTE — SIGNIFICANT NOTE
" Wound Eval / Progress Noted    YAIMA Stockton     Patient Name: Preston Wallis  : 1965  MRN: [id number]  Today's Date: 2025                 Admit Date: 2025    Visit Dx:    ICD-10-CM ICD-9-CM   1. Pneumonia due to Pseudomonas species, unspecified laterality, unspecified part of lung  J15.1 482.1   2. Urinary tract infection associated with indwelling urethral catheter, initial encounter  T83.511A 996.64    N39.0 599.0   3. Perianal abscess  K61.0 566   4. COPD exacerbation  J44.1 491.21   5. Bronchiectasis without complication  J47.9 494.0   6. Allergy, initial encounter  T78.40XA 995.3   7. Acute hypoxic on chronic hypercapnic respiratory failure  J96.01 518.84    J96.12    8. Tobacco abuse, in remission  F17.201 305.1   9. Chronic dyspnea  R06.09 786.09   10. Obstructive sleep apnea  G47.33 327.23   11. Chronic respiratory failure with hypoxia and hypercapnia  J96.11 518.83    J96.12 799.02     786.09   12. Chronic obstructive pulmonary disease, unspecified COPD type  J44.9 496   13. Wound of gluteal cleft, unspecified laterality, subsequent encounter  S31.809D V58.89     877.0         PNA (pneumonia)    COPD exacerbation    Bronchiectasis without complication    Pneumonia due to Pseudomonas species    Perianal abscess    1. Incision and drainage of posterior perianal abscess 2. Sharp excisional debridement through skin and subcutaneous tissue in the posterior perianal area, 9 x 6 x 1 cm    Wound of gluteal cleft        Past Medical History:   Diagnosis Date    1. Incision and drainage of posterior perianal abscess 2. Sharp excisional debridement through skin and subcutaneous tissue in the posterior perianal area, 9 x 6 x 1 cm 2025    Age-related cognitive decline     Allergic contact dermatitis     Allergies     Anemia     Bedbound     2023 \"MY LEG MUSCLES STOPPED WORKING\"    Bronchiectasis with acute lower respiratory infection     Charcot foot due to diabetes mellitus 09/10/2013    Chronic " "diastolic (congestive) heart failure     Chronic kidney disease     Chronic respiratory failure with hypoxia     Closed supracondylar fracture of femur 01/12/2022    COPD (chronic obstructive pulmonary disease)     Deep vein thrombosis (DVT) of lower extremity associated with air travel 01/13/2023    Dependence on supplemental oxygen     Eczema     Erectile dysfunction     due to organic reasons    Essential (primary) hypertension     Fracture     closed fracture of other tarsal and metatarsal bones    Fracture of proximal humerus 01/13/2023    GERD without esophagitis     High risk medication use     Hypercholesteremia     Hypomagnesemia     Infected stasis ulcer of left lower extremity 01/13/2023    Insomnia     Low back pain     Major depressive disorder     Morbid (severe) obesity due to excess calories     MRSA pneumonia     Muscle weakness     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 without evidence of necrosis     Obstructive sleep apnea (adult) (pediatric)     On home O2     REPORTS WEARING 2L/NC AAT    Other forms of dyspnea     Other long term (current) drug therapy     Other specified noninfective gastroenteritis and colitis     Other spondylosis, lumbar region     Pain in both knees     Paroxysmal atrial fibrillation     Peripheral neuropathy     attributed to type 2 diabetes    Pneumonia, unspecified organism     Polyneuropathy     Rash and other nonspecific skin eruption     Self-catheterizes urinary bladder     EVERY 4 HOURS    Smoking     \"SOMETIMES\"    Syncope and collapse     Tachycardia     Tinnitus 01/13/2023    Type 1 diabetes mellitus with diabetic chronic kidney disease     Type 2 diabetes mellitus     Unspecified fall, initial encounter     Urinary retention         Past Surgical History:   Procedure Laterality Date    CARDIAC CATHETERIZATION Left 8/15/2024    Procedure: Carbon dioxide aortogram with left leg angiogram, possible angioplasty or " stenting;  Surgeon: Moshe Willson MD;  Location: [facility] CATH INVASIVE LOCATION;  Service: Vascular;  Laterality: Left;    CHOLECYSTECTOMY      CYSTOSCOPY      FEMUR SURGERY Left     Shravan placed    INCISION AND DRAINAGE ABSCESS N/A 1/26/2025    Procedure: INCISION AND DRAINAGE ABSCESS; plain text: incision and drain pus from buttocks wound;  Surgeon: Emerson Canada MD;  [place] [facility] [place] [facility]  Service: General;  Laterality: N/A;    KNEE SURGERY Left     OTHER SURGICAL HISTORY Left     venous port, REMOVED    PORTACATH PLACEMENT Right     TIBIAL PLATEAU OPEN REDUCTION INTERNAL FIXATION Left 12/22/2023    Procedure: TIBIAL PLATEAU OPEN REDUCTION INTERNAL FIXATION;  Surgeon: Hugo Kline MD;  Location: [facility] OR;  Service: Orthopedics;  Laterality: Left;    TONSILLECTOMY AND ADENOIDECTOMY           Physical Assessment:      Wound 02/16/24 1700 Left posterior foot Pressure Injury (Active)   Wound Image    01/28/25 1900   Pressure Injury Stage U 01/28/25 1900   Dressing Appearance intact 01/28/25 1900   Base black 01/28/25 1900   Black (%), Wound Tissue Color 100 01/28/25 1900   Periwound dry;redness 01/28/25 1900   Periwound Skin Turgor firm 01/28/25 1900   Wound Length (cm) 2.4 cm 01/28/25 1900   Wound Width (cm) 3.3 cm 01/28/25 1900   Wound Depth (cm) 0 cm 01/28/25 1900   Wound Surface Area (cm^2) 7.92 cm^2 01/28/25 1900   Wound Volume (cm^3) 0 cm^3 01/28/25 1900   Drainage Amount none 01/28/25 1900   Care, Wound cleansed with;irrigated with;sterile normal saline 01/28/25 1900   Dressing Care dressing applied;gauze, [id number];abdominal pad;other (see comments) 01/28/25 1900    Computer issue with LDA, unable to chart heel vs left foot plantar near 5th toe area -- above is the heel assessment      The plantar area near 5th toe is purple and firm, betadine paint applied.          Wound 01/23/25 2330 coccyx pressure injury (Active)   Wound Image   01/28/25 1900   Dressing Appearance moist drainage  01/28/25 1900   Base black;red;pink 01/28/25 1900   Red (%), Wound Tissue Color 20 01/28/25 1900   Periwound pink 01/28/25 1900   Periwound Temperature warm 01/28/25 1900   Periwound Skin Turgor soft 01/28/25 1900   Edges open 01/28/25 1900   Wound Length (cm) 5 cm 01/28/25 1900   Wound Width (cm) 4.2 cm 01/28/25 1900   Wound Depth (cm) 3.8 cm 01/28/25 1900   Wound Surface Area (cm^2) 21 cm^2 01/28/25 1900   Wound Volume (cm^3) 79.8 cm^3 01/28/25 1900   Drainage Characteristics/Odor sanguineous 01/28/25 1900   Drainage Amount small 01/28/25 1900   Care, Wound cleansed with;irrigated with;sterile normal saline 01/28/25 1900   Dressing Care gauze, [id number];dressing applied;dressing changed;dressing moistened;silicone border foam 01/28/25 1900       Wound Left lower arm skin tear (Active)   Wound Image   01/28/25 1900   Dressing Appearance intact 01/28/25 1900   Base red;moist 01/28/25 1900   Periwound ecchymotic 01/28/25 1900   Periwound Temperature warm 01/28/25 1900   Periwound Skin Turgor soft 01/28/25 1900   Edges open 01/28/25 1900   Wound Length (cm) 1 cm 01/28/25 1900   Wound Width (cm) 0.4 cm 01/28/25 1900   Wound Depth (cm) 0.1 cm 01/28/25 1900   Wound Surface Area (cm^2) 0.4 cm^2 01/28/25 1900   Wound Volume (cm^3) 0.04 cm^3 01/28/25 1900   Drainage Characteristics/Odor sanguineous 01/28/25 1900   Drainage Amount scant 01/28/25 1900   Care, Wound cleansed with;irrigated with;sterile normal saline 01/28/25 1900   Dressing Care silicone border foam;other (see comments) 01/28/25 1900        Wound Check / Follow-up:  wound nurse consult for post perianal area debridement. Patient resting in bed on [address]. Alert and agreeable to full wound assessment.     Noted with bloody dressing to left arm, patient stated he bumped on bedside table during meal, removed bandage with NS soaking, partial thickness skin tear noted with 2 openings present, partial flaps in place 80% reapproximated. Oil emulsion and silicone  border applied to pad and protect for healing.     Perianal area post debridement: black tissue present but surgicel was used for hemastasis during surgery, slight bloody ooze still present, did not remove surgicel at this time, rest of tissue is pink and moist extends into perineum. BM inducing meds per staff reports so likely will need dressings changed around BM(s). Tolerated dressing change well, NS moist 2in aby placed and covered with dry dressing and vented silicone border dressing for padding.     Left heel wound : necrotic black dry, crack noted in center but eschar does not remove with cleansing. Betadine moist gauze applied, covered with ABD, kerlix, and heel offloading boot properly placed with heel in opening of boot.     Left foot plantar aspect near 5th toe with discolored area of purple skin, old wound, high risk for re-occurrence, painted with betadine.     Right foot deformity noted, no skin issues present at this time.     Impression: perianal wound with scant bloody ooze present (evidence of surgicel and black tissue from this noted), left heel remains necrotic, left arm (new skin tear), left plantar foot along 5th toe purple skin noted to old wound site.     Short term goals:  turning off of buttocks as much as tolerated by patient, heel offloading with boot or floating, wound care toward healing.     Ayanna Bruner RN    1/28/2025    19:45 EST

## 2025-01-29 NOTE — PLAN OF CARE
Goal Outcome Evaluation:  Plan of Care Reviewed With: patient        Progress: improving  Outcome Evaluation: Wound care completed per orders. Patient still without BM since 1/22 (per charting). Scheduled bowel regimen given per orders. Wound care nurse came and marked patient's abdomen for diverting colostomy. Procedure canceled for tomorrow d/t surgeon illness; will be resheduled for next week.

## 2025-01-29 NOTE — ANESTHESIA PREPROCEDURE EVALUATION
Anesthesia Evaluation     Patient summary reviewed and Nursing notes reviewed   no history of anesthetic complications:   NPO Solid Status: > 8 hours  NPO Liquid Status: > 2 hours           Airway   Mallampati: II  TM distance: >3 FB  Neck ROM: full  No difficulty expected  Dental    (+) edentulous    Pulmonary - normal exam    breath sounds clear to auscultation  (+) pneumonia , a smoker Former, COPD,home oxygen, shortness of breath, sleep apnea  Cardiovascular - normal exam  Exercise tolerance: poor (<4 METS)    PT is on anticoagulation therapy  Rhythm: regular  Rate: normal    (+) hypertension, dysrhythmias Paroxysmal Atrial Fib, CHF Systolic <55%, PVD, DVT, hyperlipidemia    ROS comment: Chest pain and elevated trop on arrival. Negative stress this admission     Neuro/Psych  (+) seizures, numbness    ROS Comment: Non ambulatory   GI/Hepatic/Renal/Endo    (+) morbid obesity, renal disease- CRI, diabetes mellitus poorly controlled    Musculoskeletal     Abdominal    Substance History      OB/GYN          Other   arthritis, blood dyscrasia anemia,            Hb 7.2  Cr 2.4    Interpretation Summary 1/23/25       •  Left ventricular systolic function is low normal. Calculated left ventricular EF = 49.4%  •  Left ventricular wall thickness is consistent with moderate concentric hypertrophy.  •  Left ventricular diastolic function is consistent with (grade I) impaired relaxation.  •  There is a small (<1cm) pericardial effusion adjacent to the right ventricle.       Interpretation Summary Stress 1/27/2025       •  Left ventricular ejection fraction is mildly reduced (Calculated EF = 40%).  •  Low probability of ischemia during this study, patient may had an apical infarct versus apical thinning..  •  Findings consistent with an equivocal ECG stress test.    Last eliquis 1/25 pm       Anesthesia Plan    ASA 4     general     (Diverting colostomy due to sacral wound    No block due to anticoagulation, pt states his  breathing is as good as it gets, had breathing treatment prior to arrival    Discussed r/b at length, pt w/ type and screen, hgb 8.2 this am)  intravenous induction     Anesthetic plan, risks, benefits, and alternatives have been provided, discussed and informed consent has been obtained with: patient.    Use of blood products discussed with patient .        CODE STATUS:    Code Status (Patient has no pulse and is not breathing): CPR (Attempt to Resuscitate)  Medical Interventions (Patient has pulse or is breathing): Full Support

## 2025-01-30 ENCOUNTER — ANESTHESIA (OUTPATIENT)
Dept: PERIOP | Facility: HOSPITAL | Age: 60
End: 2025-01-30
Payer: COMMERCIAL

## 2025-01-30 LAB
ANION GAP SERPL CALCULATED.3IONS-SCNC: 8 MMOL/L (ref 5–15)
BACTERIA SPEC AEROBE CULT: ABNORMAL
BACTERIA SPEC AEROBE CULT: ABNORMAL
BASOPHILS # BLD AUTO: 0.01 10*3/MM3 (ref 0–0.2)
BASOPHILS NFR BLD AUTO: 0.1 % (ref 0–1.5)
BUN SERPL-MCNC: 100 MG/DL (ref 6–20)
BUN/CREAT SERPL: 41.5 (ref 7–25)
CALCIUM SPEC-SCNC: 7.9 MG/DL (ref 8.6–10.5)
CHLORIDE SERPL-SCNC: 99 MMOL/L (ref 98–107)
CO2 SERPL-SCNC: 32 MMOL/L (ref 22–29)
CREAT SERPL-MCNC: 2.41 MG/DL (ref 0.76–1.27)
DEPRECATED RDW RBC AUTO: 45 FL (ref 37–54)
EGFRCR SERPLBLD CKD-EPI 2021: 30.2 ML/MIN/1.73
EOSINOPHIL # BLD AUTO: 0.19 10*3/MM3 (ref 0–0.4)
EOSINOPHIL NFR BLD AUTO: 1.2 % (ref 0.3–6.2)
ERYTHROCYTE [DISTWIDTH] IN BLOOD BY AUTOMATED COUNT: 14.1 % (ref 12.3–15.4)
GLUCOSE BLDC GLUCOMTR-MCNC: 103 MG/DL (ref 70–99)
GLUCOSE BLDC GLUCOMTR-MCNC: 180 MG/DL (ref 70–99)
GLUCOSE BLDC GLUCOMTR-MCNC: 210 MG/DL (ref 70–99)
GLUCOSE BLDC GLUCOMTR-MCNC: 81 MG/DL (ref 70–99)
GLUCOSE SERPL-MCNC: 114 MG/DL (ref 65–99)
GRAM STN SPEC: ABNORMAL
GRAM STN SPEC: ABNORMAL
HCT VFR BLD AUTO: 23 % (ref 37.5–51)
HGB BLD-MCNC: 7.1 G/DL (ref 13–17.7)
IMM GRANULOCYTES # BLD AUTO: 0.45 10*3/MM3 (ref 0–0.05)
IMM GRANULOCYTES NFR BLD AUTO: 2.8 % (ref 0–0.5)
LYMPHOCYTES # BLD AUTO: 2.04 10*3/MM3 (ref 0.7–3.1)
LYMPHOCYTES NFR BLD AUTO: 12.8 % (ref 19.6–45.3)
MCH RBC QN AUTO: 27.2 PG (ref 26.6–33)
MCHC RBC AUTO-ENTMCNC: 30.9 G/DL (ref 31.5–35.7)
MCV RBC AUTO: 88.1 FL (ref 79–97)
MONOCYTES # BLD AUTO: 1.75 10*3/MM3 (ref 0.1–0.9)
MONOCYTES NFR BLD AUTO: 10.9 % (ref 5–12)
NEUTROPHILS NFR BLD AUTO: 11.55 10*3/MM3 (ref 1.7–7)
NEUTROPHILS NFR BLD AUTO: 72.2 % (ref 42.7–76)
NRBC BLD AUTO-RTO: 0.2 /100 WBC (ref 0–0.2)
PLATELET # BLD AUTO: 321 10*3/MM3 (ref 140–450)
PMV BLD AUTO: 9 FL (ref 6–12)
POTASSIUM SERPL-SCNC: 3.9 MMOL/L (ref 3.5–5.2)
RBC # BLD AUTO: 2.61 10*6/MM3 (ref 4.14–5.8)
SODIUM SERPL-SCNC: 139 MMOL/L (ref 136–145)
WBC NRBC COR # BLD AUTO: 15.99 10*3/MM3 (ref 3.4–10.8)

## 2025-01-30 PROCEDURE — 99232 SBSQ HOSP IP/OBS MODERATE 35: CPT | Performed by: STUDENT IN AN ORGANIZED HEALTH CARE EDUCATION/TRAINING PROGRAM

## 2025-01-30 PROCEDURE — 85025 COMPLETE CBC W/AUTO DIFF WBC: CPT | Performed by: STUDENT IN AN ORGANIZED HEALTH CARE EDUCATION/TRAINING PROGRAM

## 2025-01-30 PROCEDURE — 63710000001 INSULIN LISPRO (HUMAN) PER 5 UNITS: Performed by: STUDENT IN AN ORGANIZED HEALTH CARE EDUCATION/TRAINING PROGRAM

## 2025-01-30 PROCEDURE — 94799 UNLISTED PULMONARY SVC/PX: CPT

## 2025-01-30 PROCEDURE — 94760 N-INVAS EAR/PLS OXIMETRY 1: CPT

## 2025-01-30 PROCEDURE — 63710000001 INSULIN GLARGINE PER 5 UNITS: Performed by: STUDENT IN AN ORGANIZED HEALTH CARE EDUCATION/TRAINING PROGRAM

## 2025-01-30 PROCEDURE — 82948 REAGENT STRIP/BLOOD GLUCOSE: CPT | Performed by: INTERNAL MEDICINE

## 2025-01-30 PROCEDURE — 80048 BASIC METABOLIC PNL TOTAL CA: CPT | Performed by: INTERNAL MEDICINE

## 2025-01-30 PROCEDURE — 82948 REAGENT STRIP/BLOOD GLUCOSE: CPT

## 2025-01-30 PROCEDURE — 63710000001 INSULIN LISPRO (HUMAN) PER 5 UNITS: Performed by: INTERNAL MEDICINE

## 2025-01-30 PROCEDURE — 99232 SBSQ HOSP IP/OBS MODERATE 35: CPT | Performed by: INTERNAL MEDICINE

## 2025-01-30 PROCEDURE — 25010000002 ENOXAPARIN PER 10 MG: Performed by: INTERNAL MEDICINE

## 2025-01-30 PROCEDURE — 63710000001 REVEFENACIN 175 MCG/3ML SOLUTION: Performed by: INTERNAL MEDICINE

## 2025-01-30 PROCEDURE — 25010000002 MEROPENEM PER 100 MG: Performed by: INTERNAL MEDICINE

## 2025-01-30 PROCEDURE — 94669 MECHANICAL CHEST WALL OSCILL: CPT

## 2025-01-30 PROCEDURE — 25010000002 LINEZOLID 600 MG/300ML SOLUTION: Performed by: STUDENT IN AN ORGANIZED HEALTH CARE EDUCATION/TRAINING PROGRAM

## 2025-01-30 PROCEDURE — 94664 DEMO&/EVAL PT USE INHALER: CPT

## 2025-01-30 RX ORDER — BISACODYL 5 MG/1
5 TABLET, DELAYED RELEASE ORAL DAILY
Status: DISCONTINUED | OUTPATIENT
Start: 2025-01-30 | End: 2025-02-13

## 2025-01-30 RX ORDER — POLYETHYLENE GLYCOL 3350 17 G/17G
17 POWDER, FOR SOLUTION ORAL DAILY
Status: DISCONTINUED | OUTPATIENT
Start: 2025-01-30 | End: 2025-02-13

## 2025-01-30 RX ORDER — AMOXICILLIN 250 MG
2 CAPSULE ORAL 2 TIMES DAILY
Status: DISCONTINUED | OUTPATIENT
Start: 2025-01-30 | End: 2025-02-13

## 2025-01-30 RX ORDER — BISACODYL 10 MG
10 SUPPOSITORY, RECTAL RECTAL DAILY PRN
Status: DISCONTINUED | OUTPATIENT
Start: 2025-01-30 | End: 2025-02-13

## 2025-01-30 RX ADMIN — FERROUS SULFATE TAB 325 MG (65 MG ELEMENTAL FE) 325 MG: 325 (65 FE) TAB at 08:38

## 2025-01-30 RX ADMIN — ENOXAPARIN SODIUM 40 MG: 100 INJECTION SUBCUTANEOUS at 21:42

## 2025-01-30 RX ADMIN — INSULIN LISPRO 4 UNITS: 100 INJECTION, SOLUTION INTRAVENOUS; SUBCUTANEOUS at 12:25

## 2025-01-30 RX ADMIN — GUAIFENESIN 600 MG: 600 TABLET ORAL at 08:38

## 2025-01-30 RX ADMIN — ATORVASTATIN CALCIUM 20 MG: 10 TABLET, FILM COATED ORAL at 08:38

## 2025-01-30 RX ADMIN — TOBRAMYCIN 300 MG: 300 SOLUTION RESPIRATORY (INHALATION) at 20:02

## 2025-01-30 RX ADMIN — BUMETANIDE 2 MG: 1 TABLET ORAL at 21:41

## 2025-01-30 RX ADMIN — CARVEDILOL 25 MG: 25 TABLET, FILM COATED ORAL at 08:38

## 2025-01-30 RX ADMIN — BUSPIRONE HYDROCHLORIDE 15 MG: 5 TABLET ORAL at 21:41

## 2025-01-30 RX ADMIN — ARFORMOTEROL TARTRATE 15 MCG: 15 SOLUTION RESPIRATORY (INHALATION) at 09:40

## 2025-01-30 RX ADMIN — SENNOSIDES AND DOCUSATE SODIUM 2 TABLET: 50; 8.6 TABLET ORAL at 21:41

## 2025-01-30 RX ADMIN — HYDROCODONE BITARTRATE AND ACETAMINOPHEN 1 TABLET: 5; 325 TABLET ORAL at 22:03

## 2025-01-30 RX ADMIN — GUAIFENESIN 600 MG: 600 TABLET ORAL at 21:41

## 2025-01-30 RX ADMIN — BUDESONIDE 0.5 MG: 0.5 INHALANT RESPIRATORY (INHALATION) at 19:59

## 2025-01-30 RX ADMIN — Medication 10 ML: at 08:39

## 2025-01-30 RX ADMIN — INSULIN LISPRO 7 UNITS: 100 INJECTION, SOLUTION INTRAVENOUS; SUBCUTANEOUS at 08:38

## 2025-01-30 RX ADMIN — BUDESONIDE 0.5 MG: 0.5 INHALANT RESPIRATORY (INHALATION) at 09:40

## 2025-01-30 RX ADMIN — ASPIRIN 81 MG: 81 TABLET, COATED ORAL at 06:45

## 2025-01-30 RX ADMIN — BISACODYL 5 MG: 5 TABLET, COATED ORAL at 08:38

## 2025-01-30 RX ADMIN — TAMSULOSIN HYDROCHLORIDE 0.4 MG: 0.4 CAPSULE ORAL at 11:06

## 2025-01-30 RX ADMIN — Medication 10 ML: at 21:42

## 2025-01-30 RX ADMIN — LINEZOLID 600 MG: 600 INJECTION, SOLUTION INTRAVENOUS at 21:41

## 2025-01-30 RX ADMIN — INSULIN GLARGINE 25 UNITS: 100 INJECTION, SOLUTION SUBCUTANEOUS at 21:42

## 2025-01-30 RX ADMIN — MEROPENEM 1000 MG: 1 INJECTION INTRAVENOUS at 01:42

## 2025-01-30 RX ADMIN — NIFEDIPINE 30 MG: 30 TABLET, FILM COATED, EXTENDED RELEASE ORAL at 06:46

## 2025-01-30 RX ADMIN — MONTELUKAST 10 MG: 10 TABLET, FILM COATED ORAL at 21:41

## 2025-01-30 RX ADMIN — SENNOSIDES AND DOCUSATE SODIUM 2 TABLET: 50; 8.6 TABLET ORAL at 08:38

## 2025-01-30 RX ADMIN — POLYETHYLENE GLYCOL 3350 17 G: 17 POWDER, FOR SOLUTION ORAL at 08:38

## 2025-01-30 RX ADMIN — ARFORMOTEROL TARTRATE 15 MCG: 15 SOLUTION RESPIRATORY (INHALATION) at 19:59

## 2025-01-30 RX ADMIN — REVEFENACIN 175 MCG: 175 SOLUTION RESPIRATORY (INHALATION) at 09:40

## 2025-01-30 RX ADMIN — TOBRAMYCIN 300 MG: 300 SOLUTION RESPIRATORY (INHALATION) at 09:40

## 2025-01-30 RX ADMIN — BUSPIRONE HYDROCHLORIDE 15 MG: 5 TABLET ORAL at 08:38

## 2025-01-30 RX ADMIN — CARVEDILOL 25 MG: 25 TABLET, FILM COATED ORAL at 21:41

## 2025-01-30 RX ADMIN — Medication 10 ML: at 08:40

## 2025-01-30 RX ADMIN — BUMETANIDE 2 MG: 1 TABLET ORAL at 08:38

## 2025-01-30 RX ADMIN — INSULIN LISPRO 7 UNITS: 100 INJECTION, SOLUTION INTRAVENOUS; SUBCUTANEOUS at 12:25

## 2025-01-30 RX ADMIN — FINASTERIDE 5 MG: 5 TABLET, FILM COATED ORAL at 08:38

## 2025-01-30 RX ADMIN — INSULIN LISPRO 2 UNITS: 100 INJECTION, SOLUTION INTRAVENOUS; SUBCUTANEOUS at 21:42

## 2025-01-30 RX ADMIN — FAMOTIDINE 40 MG: 20 TABLET ORAL at 06:46

## 2025-01-30 RX ADMIN — MAGNESIUM HYDROXIDE 10 ML: 2400 SUSPENSION ORAL at 11:05

## 2025-01-30 RX ADMIN — LINEZOLID 600 MG: 600 INJECTION, SOLUTION INTRAVENOUS at 08:39

## 2025-01-30 NOTE — PROGRESS NOTES
Pulmonary / Critical Care Progress Note      Patient Name: Preston Wallis  : 1965  MRN: [id number]  Attending:  Yolanda Cuevas*  Date of admission: 2025    Subjective   Subjective   Follow-up for acute on chronic hypoxic respiratory failure  Continues to feel better  On 3 L of oxygen    Review of Systems  General: Denied complaints  Cardiovascular:  Denied complaints  Respiratory: + Dyspnea on exertion; denied complaints  Gastrointestinal: Denied complaints        Objective   Objective     Vitals:   Temp:  [97.5 °F (36.4 °C)-98.1 °F (36.7 °C)] 98.1 °F (36.7 °C)  Heart Rate:  [84-88] 85  Resp:  [16-18] 18  BP: (134-153)/(48-75) 153/56  Flow (L/min) (Oxygen Therapy):  [3] 3    Physical Exam   Pleasant  Chronically ill  In no acute distress      Result Review    Result Review:  I have personally reviewed the results from the time of this admission to 2025 17:58 EST and agree with these findings:  []  Laboratory  []  Microbiology  []  Radiology  []  EKG/Telemetry   []  Cardiology/Vascular   []  Pathology  []  Old records  []  Other:  Most notable findings include:   -     Assessment & Plan   Assessment / Plan     Active Hospital Problems:  Active Hospital Problems    Diagnosis     **PNA (pneumonia)     1. Incision and drainage of posterior perianal abscess 2. Sharp excisional debridement through skin and subcutaneous tissue in the posterior perianal area, 9 x 6 x 1 cm     Perianal abscess     Wound of gluteal cleft     Pneumonia due to Pseudomonas species     Bronchiectasis without complication     COPD exacerbation          Impression:  Cystic bronchiectasis with acute exacerbation  History of MDR Pseudomonas  Pseudomonas pneumonia    Plan:  Much better after bronchoscopy  Continue airway clearance  Continue IV antibiotics  Continue oxygen therapy  Continue LABA LAMA and corticosteroids  We will follow-up on results of cultures  Still awaiting results of cultures and sensitivity  VTE  Prophylaxis:  Pharmacologic VTE prophylaxis orders are present.        CODE STATUS:   Code Status (Patient has no pulse and is not breathing): CPR (Attempt to Resuscitate)  Medical Interventions (Patient has pulse or is breathing): Full Support      Labs, images, and medications personally reviewed.  Discussed with patient    Electronically signed by Walter Nicole DO, 01/30/25, 5:58 PM EST.

## 2025-01-30 NOTE — PLAN OF CARE
Goal Outcome Evaluation:   Patient AAOX4, able to make wants and needs known, VSS,Pain controlled with PRN pain medication. Wound care completed per orders. No BM this shift   No acute events this shift

## 2025-01-30 NOTE — CONSULTS
Patient sleeping. Dropped off updated insulin dosing paper that we are currently using in the hospital to control blood sugars.

## 2025-01-30 NOTE — PLAN OF CARE
Goal Outcome Evaluation:  Plan of Care Reviewed With: patient        Progress: improving  Outcome Evaluation: wound care, had a small bm today

## 2025-01-31 ENCOUNTER — PATIENT OUTREACH (OUTPATIENT)
Dept: CASE MANAGEMENT | Facility: OTHER | Age: 60
End: 2025-01-31
Payer: COMMERCIAL

## 2025-01-31 DIAGNOSIS — N18.31 STAGE 3A CHRONIC KIDNEY DISEASE: ICD-10-CM

## 2025-01-31 DIAGNOSIS — Z71.89 MEDICATION CARE PLAN DISCUSSED WITH PATIENT: ICD-10-CM

## 2025-01-31 DIAGNOSIS — Z74.09 IMPAIRED MOBILITY AND ENDURANCE: ICD-10-CM

## 2025-01-31 DIAGNOSIS — D50.8 IRON DEFICIENCY ANEMIA SECONDARY TO INADEQUATE DIETARY IRON INTAKE: ICD-10-CM

## 2025-01-31 DIAGNOSIS — J96.11 CHRONIC RESPIRATORY FAILURE WITH HYPOXIA AND HYPERCAPNIA: Primary | ICD-10-CM

## 2025-01-31 DIAGNOSIS — E11.65 TYPE 2 DIABETES MELLITUS WITH HYPERGLYCEMIA, WITH LONG-TERM CURRENT USE OF INSULIN: ICD-10-CM

## 2025-01-31 DIAGNOSIS — J96.12 CHRONIC RESPIRATORY FAILURE WITH HYPOXIA AND HYPERCAPNIA: Primary | ICD-10-CM

## 2025-01-31 DIAGNOSIS — Z79.4 TYPE 2 DIABETES MELLITUS WITH HYPERGLYCEMIA, WITH LONG-TERM CURRENT USE OF INSULIN: ICD-10-CM

## 2025-01-31 LAB
ABO GROUP BLD: NORMAL
ANION GAP SERPL CALCULATED.3IONS-SCNC: 6 MMOL/L (ref 5–15)
BASOPHILS # BLD AUTO: 0.01 10*3/MM3 (ref 0–0.2)
BASOPHILS NFR BLD AUTO: 0.1 % (ref 0–1.5)
BLD GP AB SCN SERPL QL: NEGATIVE
BUN SERPL-MCNC: 95 MG/DL (ref 6–20)
BUN/CREAT SERPL: 41.7 (ref 7–25)
CALCIUM SPEC-SCNC: 8.4 MG/DL (ref 8.6–10.5)
CHLORIDE SERPL-SCNC: 98 MMOL/L (ref 98–107)
CO2 SERPL-SCNC: 37 MMOL/L (ref 22–29)
CREAT SERPL-MCNC: 2.28 MG/DL (ref 0.76–1.27)
DEPRECATED RDW RBC AUTO: 45.1 FL (ref 37–54)
EGFRCR SERPLBLD CKD-EPI 2021: 32.2 ML/MIN/1.73
EOSINOPHIL # BLD AUTO: 0.46 10*3/MM3 (ref 0–0.4)
EOSINOPHIL NFR BLD AUTO: 3.4 % (ref 0.3–6.2)
ERYTHROCYTE [DISTWIDTH] IN BLOOD BY AUTOMATED COUNT: 14.1 % (ref 12.3–15.4)
FERRITIN SERPL-MCNC: 608.6 NG/ML (ref 30–400)
GLUCOSE BLDC GLUCOMTR-MCNC: 124 MG/DL (ref 70–99)
GLUCOSE BLDC GLUCOMTR-MCNC: 161 MG/DL (ref 70–99)
GLUCOSE BLDC GLUCOMTR-MCNC: 73 MG/DL (ref 70–99)
GLUCOSE BLDC GLUCOMTR-MCNC: 94 MG/DL (ref 70–99)
GLUCOSE SERPL-MCNC: 85 MG/DL (ref 65–99)
HCT VFR BLD AUTO: 21.7 % (ref 37.5–51)
HGB BLD-MCNC: 6.6 G/DL (ref 13–17.7)
IMM GRANULOCYTES # BLD AUTO: 0.15 10*3/MM3 (ref 0–0.05)
IMM GRANULOCYTES NFR BLD AUTO: 1.1 % (ref 0–0.5)
IRON 24H UR-MRATE: 31 MCG/DL (ref 59–158)
IRON SATN MFR SERPL: 17 % (ref 20–50)
LYMPHOCYTES # BLD AUTO: 1.65 10*3/MM3 (ref 0.7–3.1)
LYMPHOCYTES NFR BLD AUTO: 12.2 % (ref 19.6–45.3)
MAGNESIUM SERPL-MCNC: 1.8 MG/DL (ref 1.6–2.6)
MCH RBC QN AUTO: 27 PG (ref 26.6–33)
MCHC RBC AUTO-ENTMCNC: 30.4 G/DL (ref 31.5–35.7)
MCV RBC AUTO: 88.9 FL (ref 79–97)
MONOCYTES # BLD AUTO: 1.23 10*3/MM3 (ref 0.1–0.9)
MONOCYTES NFR BLD AUTO: 9.1 % (ref 5–12)
NEUTROPHILS NFR BLD AUTO: 10 10*3/MM3 (ref 1.7–7)
NEUTROPHILS NFR BLD AUTO: 74.1 % (ref 42.7–76)
NRBC BLD AUTO-RTO: 0 /100 WBC (ref 0–0.2)
PHOSPHATE SERPL-MCNC: 3.3 MG/DL (ref 2.5–4.5)
PLATELET # BLD AUTO: 299 10*3/MM3 (ref 140–450)
PMV BLD AUTO: 9.4 FL (ref 6–12)
POTASSIUM SERPL-SCNC: 4.1 MMOL/L (ref 3.5–5.2)
RBC # BLD AUTO: 2.44 10*6/MM3 (ref 4.14–5.8)
RH BLD: NEGATIVE
SODIUM SERPL-SCNC: 141 MMOL/L (ref 136–145)
T&S EXPIRATION DATE: NORMAL
TIBC SERPL-MCNC: 185 MCG/DL (ref 298–536)
TRANSFERRIN SERPL-MCNC: 124 MG/DL (ref 200–360)
WBC NRBC COR # BLD AUTO: 13.5 10*3/MM3 (ref 3.4–10.8)

## 2025-01-31 PROCEDURE — 63710000001 REVEFENACIN 175 MCG/3ML SOLUTION: Performed by: INTERNAL MEDICINE

## 2025-01-31 PROCEDURE — P9016 RBC LEUKOCYTES REDUCED: HCPCS

## 2025-01-31 PROCEDURE — 25010000002 HYDROMORPHONE 1 MG/ML SOLUTION: Performed by: INTERNAL MEDICINE

## 2025-01-31 PROCEDURE — 36430 TRANSFUSION BLD/BLD COMPNT: CPT

## 2025-01-31 PROCEDURE — 85025 COMPLETE CBC W/AUTO DIFF WBC: CPT | Performed by: STUDENT IN AN ORGANIZED HEALTH CARE EDUCATION/TRAINING PROGRAM

## 2025-01-31 PROCEDURE — 94799 UNLISTED PULMONARY SVC/PX: CPT

## 2025-01-31 PROCEDURE — 94664 DEMO&/EVAL PT USE INHALER: CPT

## 2025-01-31 PROCEDURE — 94669 MECHANICAL CHEST WALL OSCILL: CPT

## 2025-01-31 PROCEDURE — 25010000002 LINEZOLID 600 MG/300ML SOLUTION: Performed by: STUDENT IN AN ORGANIZED HEALTH CARE EDUCATION/TRAINING PROGRAM

## 2025-01-31 PROCEDURE — 86901 BLOOD TYPING SEROLOGIC RH(D): CPT | Performed by: STUDENT IN AN ORGANIZED HEALTH CARE EDUCATION/TRAINING PROGRAM

## 2025-01-31 PROCEDURE — 82948 REAGENT STRIP/BLOOD GLUCOSE: CPT | Performed by: INTERNAL MEDICINE

## 2025-01-31 PROCEDURE — 86923 COMPATIBILITY TEST ELECTRIC: CPT

## 2025-01-31 PROCEDURE — 86900 BLOOD TYPING SEROLOGIC ABO: CPT

## 2025-01-31 PROCEDURE — 82728 ASSAY OF FERRITIN: CPT | Performed by: STUDENT IN AN ORGANIZED HEALTH CARE EDUCATION/TRAINING PROGRAM

## 2025-01-31 PROCEDURE — 25010000002 ENOXAPARIN PER 10 MG: Performed by: INTERNAL MEDICINE

## 2025-01-31 PROCEDURE — 84100 ASSAY OF PHOSPHORUS: CPT

## 2025-01-31 PROCEDURE — 83735 ASSAY OF MAGNESIUM: CPT

## 2025-01-31 PROCEDURE — 86850 RBC ANTIBODY SCREEN: CPT | Performed by: STUDENT IN AN ORGANIZED HEALTH CARE EDUCATION/TRAINING PROGRAM

## 2025-01-31 PROCEDURE — 80048 BASIC METABOLIC PNL TOTAL CA: CPT | Performed by: INTERNAL MEDICINE

## 2025-01-31 PROCEDURE — 63710000001 INSULIN GLARGINE PER 5 UNITS: Performed by: STUDENT IN AN ORGANIZED HEALTH CARE EDUCATION/TRAINING PROGRAM

## 2025-01-31 PROCEDURE — 99232 SBSQ HOSP IP/OBS MODERATE 35: CPT | Performed by: INTERNAL MEDICINE

## 2025-01-31 PROCEDURE — 83540 ASSAY OF IRON: CPT | Performed by: STUDENT IN AN ORGANIZED HEALTH CARE EDUCATION/TRAINING PROGRAM

## 2025-01-31 PROCEDURE — 63710000001 INSULIN LISPRO (HUMAN) PER 5 UNITS: Performed by: STUDENT IN AN ORGANIZED HEALTH CARE EDUCATION/TRAINING PROGRAM

## 2025-01-31 PROCEDURE — 63710000001 INSULIN LISPRO (HUMAN) PER 5 UNITS: Performed by: INTERNAL MEDICINE

## 2025-01-31 PROCEDURE — 84466 ASSAY OF TRANSFERRIN: CPT | Performed by: STUDENT IN AN ORGANIZED HEALTH CARE EDUCATION/TRAINING PROGRAM

## 2025-01-31 PROCEDURE — 30233P1 TRANSFUSION OF NONAUTOLOGOUS FROZEN RED CELLS INTO PERIPHERAL VEIN, PERCUTANEOUS APPROACH: ICD-10-PCS | Performed by: STUDENT IN AN ORGANIZED HEALTH CARE EDUCATION/TRAINING PROGRAM

## 2025-01-31 PROCEDURE — 99232 SBSQ HOSP IP/OBS MODERATE 35: CPT | Performed by: STUDENT IN AN ORGANIZED HEALTH CARE EDUCATION/TRAINING PROGRAM

## 2025-01-31 PROCEDURE — 86900 BLOOD TYPING SEROLOGIC ABO: CPT | Performed by: STUDENT IN AN ORGANIZED HEALTH CARE EDUCATION/TRAINING PROGRAM

## 2025-01-31 PROCEDURE — 82948 REAGENT STRIP/BLOOD GLUCOSE: CPT

## 2025-01-31 RX ORDER — HYDROCODONE BITARTRATE AND ACETAMINOPHEN 5; 325 MG/1; MG/1
1 TABLET ORAL EVERY 6 HOURS PRN
Status: DISPENSED | OUTPATIENT
Start: 2025-01-31 | End: 2025-02-05

## 2025-01-31 RX ADMIN — FINASTERIDE 5 MG: 5 TABLET, FILM COATED ORAL at 08:39

## 2025-01-31 RX ADMIN — SENNOSIDES AND DOCUSATE SODIUM 2 TABLET: 50; 8.6 TABLET ORAL at 21:40

## 2025-01-31 RX ADMIN — Medication 10 ML: at 08:43

## 2025-01-31 RX ADMIN — Medication 10 ML: at 08:42

## 2025-01-31 RX ADMIN — BUDESONIDE 0.5 MG: 0.5 INHALANT RESPIRATORY (INHALATION) at 19:23

## 2025-01-31 RX ADMIN — TAMSULOSIN HYDROCHLORIDE 0.4 MG: 0.4 CAPSULE ORAL at 08:40

## 2025-01-31 RX ADMIN — Medication 10 ML: at 21:42

## 2025-01-31 RX ADMIN — BUSPIRONE HYDROCHLORIDE 15 MG: 5 TABLET ORAL at 21:39

## 2025-01-31 RX ADMIN — LINEZOLID 600 MG: 600 INJECTION, SOLUTION INTRAVENOUS at 21:39

## 2025-01-31 RX ADMIN — BUDESONIDE 0.5 MG: 0.5 INHALANT RESPIRATORY (INHALATION) at 09:38

## 2025-01-31 RX ADMIN — INSULIN LISPRO 7 UNITS: 100 INJECTION, SOLUTION INTRAVENOUS; SUBCUTANEOUS at 08:42

## 2025-01-31 RX ADMIN — ARFORMOTEROL TARTRATE 15 MCG: 15 SOLUTION RESPIRATORY (INHALATION) at 19:23

## 2025-01-31 RX ADMIN — NIFEDIPINE 30 MG: 30 TABLET, FILM COATED, EXTENDED RELEASE ORAL at 06:24

## 2025-01-31 RX ADMIN — GUAIFENESIN 600 MG: 600 TABLET ORAL at 21:39

## 2025-01-31 RX ADMIN — ATORVASTATIN CALCIUM 20 MG: 10 TABLET, FILM COATED ORAL at 08:39

## 2025-01-31 RX ADMIN — ARFORMOTEROL TARTRATE 15 MCG: 15 SOLUTION RESPIRATORY (INHALATION) at 09:38

## 2025-01-31 RX ADMIN — LINEZOLID 600 MG: 600 INJECTION, SOLUTION INTRAVENOUS at 08:43

## 2025-01-31 RX ADMIN — REVEFENACIN 175 MCG: 175 SOLUTION RESPIRATORY (INHALATION) at 09:38

## 2025-01-31 RX ADMIN — ENOXAPARIN SODIUM 40 MG: 100 INJECTION SUBCUTANEOUS at 21:38

## 2025-01-31 RX ADMIN — INSULIN GLARGINE 25 UNITS: 100 INJECTION, SOLUTION SUBCUTANEOUS at 21:38

## 2025-01-31 RX ADMIN — TOBRAMYCIN 300 MG: 300 SOLUTION RESPIRATORY (INHALATION) at 09:38

## 2025-01-31 RX ADMIN — BUSPIRONE HYDROCHLORIDE 15 MG: 5 TABLET ORAL at 08:39

## 2025-01-31 RX ADMIN — FAMOTIDINE 40 MG: 20 TABLET ORAL at 06:24

## 2025-01-31 RX ADMIN — CARVEDILOL 25 MG: 25 TABLET, FILM COATED ORAL at 08:39

## 2025-01-31 RX ADMIN — GUAIFENESIN 600 MG: 600 TABLET ORAL at 08:39

## 2025-01-31 RX ADMIN — ASPIRIN 81 MG: 81 TABLET, COATED ORAL at 06:24

## 2025-01-31 RX ADMIN — MONTELUKAST 10 MG: 10 TABLET, FILM COATED ORAL at 21:39

## 2025-01-31 RX ADMIN — INSULIN LISPRO 2 UNITS: 100 INJECTION, SOLUTION INTRAVENOUS; SUBCUTANEOUS at 21:38

## 2025-01-31 RX ADMIN — HYDROCODONE BITARTRATE AND ACETAMINOPHEN 1 TABLET: 5; 325 TABLET ORAL at 06:24

## 2025-01-31 RX ADMIN — INSULIN LISPRO 7 UNITS: 100 INJECTION, SOLUTION INTRAVENOUS; SUBCUTANEOUS at 12:15

## 2025-01-31 RX ADMIN — FERROUS SULFATE TAB 325 MG (65 MG ELEMENTAL FE) 325 MG: 325 (65 FE) TAB at 08:39

## 2025-01-31 RX ADMIN — TOBRAMYCIN 300 MG: 300 SOLUTION RESPIRATORY (INHALATION) at 19:23

## 2025-01-31 RX ADMIN — HYDROMORPHONE HYDROCHLORIDE 0.5 MG: 1 INJECTION, SOLUTION INTRAMUSCULAR; INTRAVENOUS; SUBCUTANEOUS at 08:41

## 2025-01-31 RX ADMIN — SODIUM HYPOCHLORITE: 1.25 SOLUTION TOPICAL at 23:15

## 2025-01-31 RX ADMIN — BUMETANIDE 2 MG: 1 TABLET ORAL at 21:39

## 2025-01-31 RX ADMIN — CARVEDILOL 25 MG: 25 TABLET, FILM COATED ORAL at 21:39

## 2025-01-31 RX ADMIN — BUMETANIDE 2 MG: 1 TABLET ORAL at 08:39

## 2025-01-31 NOTE — SIGNIFICANT NOTE
" Wound Eval / Progress Noted    YAIMA Stockton     Patient Name: Preston Wallis  : 1965  MRN: [id number]  Today's Date: 2025                 Admit Date: 2025    Visit Dx:    ICD-10-CM ICD-9-CM   1. Pneumonia due to Pseudomonas species, unspecified laterality, unspecified part of lung  J15.1 482.1   2. Urinary tract infection associated with indwelling urethral catheter, initial encounter  T83.511A 996.64    N39.0 599.0   3. Perianal abscess  K61.0 566   4. COPD exacerbation  J44.1 491.21   5. Bronchiectasis without complication  J47.9 494.0   6. Allergy, initial encounter  T78.40XA 995.3   7. Acute hypoxic on chronic hypercapnic respiratory failure  J96.01 518.84    J96.12    8. Tobacco abuse, in remission  F17.201 305.1   9. Chronic dyspnea  R06.09 786.09   10. Obstructive sleep apnea  G47.33 327.23   11. Chronic respiratory failure with hypoxia and hypercapnia  J96.11 518.83    J96.12 799.02     786.09   12. Chronic obstructive pulmonary disease, unspecified COPD type  J44.9 496   13. Wound of gluteal cleft, unspecified laterality, subsequent encounter  S31.809D V58.89     877.0         PNA (pneumonia)    COPD exacerbation    Bronchiectasis without complication    Pneumonia due to Pseudomonas species    Perianal abscess    1. Incision and drainage of posterior perianal abscess 2. Sharp excisional debridement through skin and subcutaneous tissue in the posterior perianal area, 9 x 6 x 1 cm    Wound of gluteal cleft        Past Medical History:   Diagnosis Date    1. Incision and drainage of posterior perianal abscess 2. Sharp excisional debridement through skin and subcutaneous tissue in the posterior perianal area, 9 x 6 x 1 cm 2025    Age-related cognitive decline     Allergic contact dermatitis     Allergies     Anemia     Bedbound     2023 \"MY LEG MUSCLES STOPPED WORKING\"    Bronchiectasis with acute lower respiratory infection     Charcot foot due to diabetes mellitus 09/10/2013    Chronic " "diastolic (congestive) heart failure     Chronic kidney disease     Chronic respiratory failure with hypoxia     Closed supracondylar fracture of femur 01/12/2022    COPD (chronic obstructive pulmonary disease)     Deep vein thrombosis (DVT) of lower extremity associated with air travel 01/13/2023    Dependence on supplemental oxygen     Eczema     Erectile dysfunction     due to organic reasons    Essential (primary) hypertension     Fracture     closed fracture of other tarsal and metatarsal bones    Fracture of proximal humerus 01/13/2023    GERD without esophagitis     High risk medication use     Hypercholesteremia     Hypomagnesemia     Infected stasis ulcer of left lower extremity 01/13/2023    Insomnia     Low back pain     Major depressive disorder     Morbid (severe) obesity due to excess calories     MRSA pneumonia     Muscle weakness     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 without evidence of necrosis     Obstructive sleep apnea (adult) (pediatric)     On home O2     REPORTS WEARING 2L/NC AAT    Other forms of dyspnea     Other long term (current) drug therapy     Other specified noninfective gastroenteritis and colitis     Other spondylosis, lumbar region     Pain in both knees     Paroxysmal atrial fibrillation     Peripheral neuropathy     attributed to type 2 diabetes    Pneumonia, unspecified organism     Polyneuropathy     Rash and other nonspecific skin eruption     Self-catheterizes urinary bladder     EVERY 4 HOURS    Smoking     \"SOMETIMES\"    Syncope and collapse     Tachycardia     Tinnitus 01/13/2023    Type 1 diabetes mellitus with diabetic chronic kidney disease     Type 2 diabetes mellitus     Unspecified fall, initial encounter     Urinary retention         Past Surgical History:   Procedure Laterality Date    BRONCHOSCOPY N/A 1/28/2025    Procedure: BRONCHOSCOPY: BAL: insertion of lighted instrument to view inside the lung;  Surgeon: " Walter Nicole, DO;  Location: [facility] MAIN OR;  Service: Pulmonary;  Laterality: N/A;    CARDIAC CATHETERIZATION Left 8/15/2024    Procedure: Carbon dioxide aortogram with left leg angiogram, possible angioplasty or stenting;  Surgeon: Moshe Willson MD;  Location: [facility] CATH INVASIVE LOCATION;  Service: Vascular;  Laterality: Left;    CHOLECYSTECTOMY      CYSTOSCOPY      FEMUR SURGERY Left     Shravan placed    INCISION AND DRAINAGE ABSCESS N/A 1/26/2025    Procedure: INCISION AND DRAINAGE ABSCESS; plain text: incision and drain pus from buttocks wound;  Surgeon: Emerson Canada MD;  Location: [facility] OR OSC;  Service: General;  Laterality: N/A;    KNEE SURGERY Left     OTHER SURGICAL HISTORY Left     venous port, REMOVED    PORTACATH PLACEMENT Right     TIBIAL PLATEAU OPEN REDUCTION INTERNAL FIXATION Left 12/22/2023    Procedure: TIBIAL PLATEAU OPEN REDUCTION INTERNAL FIXATION;  Surgeon: Hugo Kline MD;  Location: [facility] MAIN OR;  Service: Orthopedics;  Laterality: Left;    TONSILLECTOMY AND ADENOIDECTOMY           Physical Assessment:  Wound 02/16/24 1700 Left posterior foot Pressure Injury (Active)   Wound Image   01/31/25 1105   Pressure Injury Stage U 01/31/25 1105   Dressing Appearance intact;dry 01/31/25 1105   Closure None 01/31/25 1105   Base black;necrotic;dry 01/31/25 1105   Periwound dry;redness 01/31/25 1105   Periwound Temperature warm 01/31/25 1105   Periwound Skin Turgor soft 01/31/25 1105   Edges rolled/closed 01/31/25 1105   Drainage Amount none 01/31/25 1105   Care, Wound cleansed with;sterile normal saline 01/31/25 1105   Dressing Care dressing applied;gauze, [id number];abdominal pad;gauze, dry;other (see comments) 01/31/25 1105   Periwound Care absorptive dressing applied 01/31/25 1105       Wound 01/23/25 2330 coccyx pressure injury (Active)   Wound Image   01/31/25 1105   Pressure Injury Stage 3 01/31/25 1105   Dressing Appearance moist drainage;intact 01/31/25 1105    Closure None 01/31/25 1105   Base red;pink;moist;subcutaneous 01/31/25 1105   Periwound intact;dry;pink 01/31/25 1105   Periwound Temperature warm 01/31/25 1105   Periwound Skin Turgor soft 01/31/25 1105   Edges open 01/31/25 1105   Wound Length (cm) 5 cm 01/31/25 1105   Wound Width (cm) 4.5 cm 01/31/25 1105   Wound Depth (cm) 4 cm 01/31/25 1105   Wound Surface Area (cm^2) 22.5 cm^2 01/31/25 1105   Wound Volume (cm^3) 90 cm^3 01/31/25 1105   Drainage Characteristics/Odor serosanguineous 01/31/25 1105   Drainage Amount moderate 01/31/25 1105   Care, Wound cleansed with;irrigated with;sterile normal saline 01/31/25 1105   Dressing Care dressing applied;dressing moistened;packed with;gauze, [id number];sodium chloride impregnated;silicone border foam 01/31/25 1105   Periwound Care absorptive dressing applied 01/31/25 1105       Wound Left lower arm skin tear (Active)   Wound Image   01/31/25 1105   Dressing Appearance moist drainage;intact 01/31/25 1105   Closure None 01/31/25 1105   Base red;bleeding 01/31/25 1105   Red (%), Wound Tissue Color 100 01/31/25 1105   Periwound intact;ecchymotic;pink 01/31/25 1105   Periwound Temperature warm 01/31/25 1105   Periwound Skin Turgor soft 01/31/25 1105   Edges open 01/31/25 1105   Drainage Characteristics/Odor bleeding controlled;sanguineous 01/31/25 1105   Drainage Amount scant 01/31/25 1105   Care, Wound cleansed with;sterile normal saline 01/31/25 1105   Dressing Care dressing applied;non-adherent;petroleum-based;gauze;silicone border foam 01/31/25 1105   Periwound Care absorptive dressing applied 01/31/25 1105        Wound Check / Follow-up:  Patient seen today for a wound check and dressing change. Patient is awake, alert and oriented at the time of the assessment; patient was agreeable to the visit.     Patient with skin tear to the left anterior arm with small open red wound base.  Patient did have a small amount of sanguinous drainage, bleeding was controlled with  cleansing. Recommending to cover with silver impregnated Hydrofiber daily.    Left heel with dry stable necrotic tissue, tissue is firm.  Periwound remains soft with callused tissue around the wound edges. Recommending to continue the application of betadine moistened fluffed gauze.    Stage III injury noted to the perianal region. Darkened area to the wound base appears to be blood clot, some was dislodged during cleansing with the NS. No active bleeding present. Majority of the wound base is red but there are scattered areas of pink moist tissue as well. Periwound is soft and intact. Cleanse and irrigated with NS. Filled the wound with NS moistened fluffed gauze. Recommending to change the current dressing to 1/4 strength dakin's moistened fluffed. Discussed the plan of care with the attending physician, physician was agreeable to the change in treatment.    Impression: surgical incision with delayed closure, skin tear, black eschar to the left heel    Short term goals: Regain skin integrity, skin protection, moist prevention, pressure reduction, topical treatment, twice daily dressing changes and daily dressing changes, quality skin care hygiene.    Soco Cardoza RN    1/31/2025    12:30 EST

## 2025-01-31 NOTE — SIGNIFICANT NOTE
01/31/25 0745   Physical Therapy Time and Intention   Session Not Performed other (see comments)  (hgb at 6.6)

## 2025-01-31 NOTE — PROGRESS NOTES
[facility]   Hospitalist Progress Note  Date: 2025  Patient Name: Preston Wallis  : 1965  MRN: [id number]  Date of admission: 2025  Room/Bed: 360/1      Subjective   Subjective     Chief Complaint: SOB    Summary:Preston Wallis is a 59 y.o. male past medical history of COPD, hypertension, CHF, history of MDRO presents to the ED due to shortness of breath. Patient was discharged on  for MDRO pseudomonas pneumonia on IV ertapenem.     Patient admitted for shortness of breath.  Chest x-ray shows chronic bilateral lung infiltrates.  CT was performed to arrival of the chest which demonstrates new spiculated left lower lobe nodule, bronchiectasis throughout both lungs, micronodule or peribronchial densities suggestive of atypical infection, right paratracheal lymph node and enlarging pericardial effusion.  Pulmonology and cardiology services consulted.  TTE demonstrates very small pericardial effusion only, estimated EF 49%, with some grade 1 diastolic dysfunction left ventricle.  Wound care noted necrotic tissue perianal region.  CT abdomen pelvis was performed, 4.5 cm x 1.8 cm x 3.5 cm posterior anal abscess noted.  General surgery consulted, patient underwent incision and drainage .  S/p bronchoscopy 2025, BAL growing Pseudomonas.    Interval Followup: No acute overnight events.  No acute distress.  Patient resting comfortably in bed today, no family at bedside.  Discussed need to transfuse 1 unit PRBCs given his low hemoglobin today.  He reports that he is doing well, respiratory status a little improved.  Answer question concerns.    Objective   Objective     Vitals:   Temp:  [97.7 °F (36.5 °C)-98.4 °F (36.9 °C)] 97.7 °F (36.5 °C)  Heart Rate:  [80-88] 81  Resp:  [18] 18  BP: (138-156)/(48-65) 156/59  Flow (L/min) (Oxygen Therapy):  [3] 3    Physical Exam   Gen: NAD, Alert and Oriented  Pulm: Nasal cannula in place, no respiratory distress  Abd: soft, nondistended  Extremities:  no pitting edema    Result Review    Result Review:  I have personally reviewed these results:  [x]  Laboratory      Lab 01/31/25  0609 01/30/25  0644 01/29/25 0417   WBC 13.50* 15.99* 16.99*   HEMOGLOBIN 6.6* 7.1* 7.2*   HEMATOCRIT 21.7* 23.0* 24.2*   PLATELETS 299 321 313   NEUTROS ABS 10.00* 11.55* 13.05*   IMMATURE GRANS (ABS) 0.15* 0.45* 1.06*   LYMPHS ABS 1.65 2.04 1.43   MONOS ABS 1.23* 1.75* 1.41*   EOS ABS 0.46* 0.19 0.00   MCV 88.9 88.1 90.6         Lab 01/31/25  0609 01/30/25  0644 01/29/25 0417   SODIUM 141 139 137   POTASSIUM 4.1 3.9 4.4   CHLORIDE 98 99 99   CO2 37.0* 32.0* 30.3*   ANION GAP 6.0 8.0 7.7   BUN 95* 100* 98*   CREATININE 2.28* 2.41* 2.39*   EGFR 32.2* 30.2* 30.5*   GLUCOSE 85 114* 263*   CALCIUM 8.4* 7.9* 7.9*   MAGNESIUM 1.8  --   --    PHOSPHORUS 3.3  --   --                      Lab 01/25/25  0349   CHOLESTEROL 116   LDL CHOL 50   HDL CHOL 54   TRIGLYCERIDES 49         Lab 01/31/25  0859   ABO TYPING O   RH TYPING Negative   ANTIBODY SCREEN Negative           Brief Urine Lab Results  (Last result in the past 365 days)        Color   Clarity   Blood   Leuk Est   Nitrite   Protein   CREAT   Urine HCG        01/23/25 1912 Yellow   Turbid   Large (3+)   Moderate (2+)   Negative   >=300 mg/dL (3+)                 [x]  Microbiology   Microbiology Results (last 10 days)       Procedure Component Value - Date/Time    AFB Culture - Lavage, Lung, Right Upper Lobe [[id number]] Collected: 01/28/25 0847    Lab Status: Preliminary result Specimen: Lavage from Lung, Right Upper Lobe Updated: 01/29/25 1216     AFB Stain No acid fast bacilli seen on direct smear      No acid fast bacilli seen on concentrated smear    BAL Culture, Quantitative - Lavage, Lung, Right Upper Lobe [[id number]]  (Abnormal) Collected: 01/28/25 0847    Lab Status: Preliminary result Specimen: Lavage from Lung, Right Upper Lobe Updated: 01/31/25 1059     BAL Culture >100,000 CFU/mL Pseudomonas aeruginosa     Comment: MICS to  follow 1/31/25         No Normal Respiratory Nola     Gram Stain Moderate (3+) WBCs seen      Rare (1+) Gram positive cocci in clusters    Pneumonia Panel - Lavage, Lung, Right Upper Lobe [[id number]]  (Abnormal) Collected: 01/28/25 0847    Lab Status: Final result Specimen: Lavage from Lung, Right Upper Lobe Updated: 01/28/25 1120     Escherichia coli PCR Not Detected     Acinetobacter calcoaceticus-baumannii complex PCR Not Detected     Enterobacter cloacae PCR Not Detected     Klebsiella oxytoca PCR Not Detected     Klebsiella pneumoniae group PCR Not Detected     Klebsiella aerogenes PCR Not Detected     Moraxella catarrhalis PCR Not Detected     Proteus species PCR Not Detected     Pseudomonas aeroginosa PCR Detected     Comment: >=10^7 Bin copies/mL        Serratia marcescens PCR Not Detected     Staphylococcus aureus PCR Not Detected     Streptococcus pyogenes PCR Not Detected     Haemophilus influenzae PCR Not Detected     Streptococcus agalactiae PCR Not Detected     Streptococcus pneumoniae PCR Not Detected     Chlamydophila pneumoniae PCR Not Detected     Legionella pneumophilia PCR Not Detected     Mycoplasma pneumo by PCR Not Detected     ADENOVIRUS, PCR Not Detected     CTX-M Gene Not Detected     IMP Gene Not Detected     KPC Gene Not Detected     mecA/C and MREJ Gene N/A     NDM Gene Not Detected     OXA-48-like Gene N/A     VIM Gene Not Detected     Coronavirus Not Detected     Human Metapneumovirus Not Detected     Human Rhinovirus/Enterovirus Not Detected     Influenza A PCR Not Detected     Influenza B PCR Not Detected     RSV, PCR Not Detected     Parainfluenza virus PCR Not Detected    Anaerobic Culture - Surgical Site, Perirectal Abscess [[id number]]  (Normal) Collected: 01/26/25 1629    Lab Status: Preliminary result Specimen: Surgical Site from Perirectal Abscess Updated: 01/30/25 0935     Anaerobic Culture Screening for Anaerobes    Wound Culture - Surgical Site, Perirectal Abscess  [[id number]]  (Abnormal)  (Susceptibility) Collected: 01/26/25 1629    Lab Status: Final result Specimen: Surgical Site from Perirectal Abscess Updated: 01/30/25 0938     Wound Culture Light growth (2+) Enterococcus faecium, VRE     Comment:   Vancomycin Resistant Enterococcus species. Patient may be an isolation risk.         Scant growth (1+) Citrobacter amalonaticus     Gram Stain Few (2+) WBCs seen      Rare (1+) Gram positive cocci in pairs and chains    Susceptibility        Enterococcus faecium, VRE      MARIO      Ampicillin Resistant      Linezolid Susceptible      Vancomycin Resistant                       Susceptibility        Citrobacter amalonaticus      MARIO      Amikacin Susceptible      Amoxicillin + Clavulanate Resistant      Cefazolin (Non Urine) Resistant      Cefepime Resistant      Ceftazidime Resistant      Ceftriaxone Resistant      Ciprofloxacin Resistant      Gentamicin Resistant      Levofloxacin Intermediate      Piperacillin + Tazobactam Susceptible      Tetracycline Resistant      Tobramycin Resistant      Trimethoprim + Sulfamethoxazole Resistant                       Susceptibility Comments       Citrobacter amalonaticus    With the exception of urinary-sourced infections, aminoglycosides should not be used as monotherapy.               AFB Culture - Surgical Site, Perirectal Abscess [[id number]] Collected: 01/26/25 1629    Lab Status: Preliminary result Specimen: Surgical Site from Perirectal Abscess Updated: 01/27/25 1358     AFB Stain No acid fast bacilli seen    Respiratory Culture - Sputum, Cough [[id number]]  (Abnormal) Collected: 01/24/25 1618    Lab Status: Preliminary result Specimen: Sputum from Cough Updated: 01/28/25 0952     Respiratory Culture Moderate growth (3+) Pseudomonas aeruginosa      Scant growth (1+) Normal Respiratory Nola     Gram Stain Few (2+) WBCs seen      Rare (1+) Gram positive cocci      Rare (1+) Gram negative bacilli    Narrative:      Sending to [facility] for  [facility] 1/28/25      AFB Culture - Sputum, Cough [[id number]] Collected: 01/24/25 1618    Lab Status: Preliminary result Specimen: Sputum from Cough Updated: 01/29/25 1631     AFB Culture No AFB isolated at less than 1 week     AFB Stain No acid fast bacilli seen on direct smear      No acid fast bacilli seen on concentrated smear    S. Pneumo Ag Urine or CSF - Urine, Indwelling Urethral Catheter [[id number]]  (Normal) Collected: 01/23/25 1912    Lab Status: Final result Specimen: Urine from Indwelling Urethral Catheter Updated: 01/24/25 1646     Strep Pneumo Ag Negative    Legionella Antigen, Urine - Urine, Indwelling Urethral Catheter [[id number]]  (Normal) Collected: 01/23/25 1912    Lab Status: Final result Specimen: Urine from Indwelling Urethral Catheter Updated: 01/24/25 1646     LEGIONELLA ANTIGEN, URINE Negative    Blood Culture - Blood, Arm, Right [[id number]]  (Normal) Collected: 01/23/25 1748    Lab Status: Final result Specimen: Blood from Arm, Right Updated: 01/28/25 1800     Blood Culture No growth at 5 days    Narrative:      Less than seven (7) mL's of blood was collected.  Insufficient quantity may yield false negative results.    Blood Culture - Blood, Hand, Left [[id number]]  (Normal) Collected: 01/23/25 1735    Lab Status: Final result Specimen: Blood from Hand, Left Updated: 01/28/25 1745     Blood Culture No growth at 5 days    Narrative:      Less than seven (7) mL's of blood was collected.  Insufficient quantity may yield false negative results.    COVID-19, FLU A/B, RSV PCR 1 HR TAT - Swab, Nasopharynx [[id number]]  (Normal) Collected: 01/23/25 1723    Lab Status: Final result Specimen: Swab from Nasopharynx Updated: 01/23/25 1806     COVID19 Not Detected     Influenza A PCR Not Detected     Influenza B PCR Not Detected     RSV, PCR Not Detected    Narrative:      Fact sheet for providers: https://www.fda.gov/media/682713/download    Fact sheet for patients:  https://www.fda.gov/media/327098/download    Test performed by PCR.          [x]  Radiology  CT Abdomen Pelvis Without Contrast    Result Date: 1/25/2025  Impression: 1.There is a posterior perianal abscess as detailed above. No intrapelvic extension. 2.Cardiomegaly with relatively small pericardial effusion. There is bibasilar atelectasis with trace pleural effusions. 3.Other incidental nonemergent findings as detailed above. Electronically Signed: Rob Milner MD  1/25/2025 10:55 AM EST  Workstation ID: [id number]    CT Chest Without Contrast Diagnostic    Result Date: 1/24/2025  Impression: 1.There is a new somewhat spiculated nodular focus in the superior left lower lobe image #66 of series. The rapid interval development suggests an infectious or inflammatory process. 3-month follow-up CT recommended per Fleischner guidelines. 2.Stable appearance of verrucous and cystic bronchiectasis involving all lobes of the lungs, but greatest in the right upper lobe and lower lobes. There are again adjacent micronodular densities in a peribronchial distribution suggesting an infectious or  inflammatory process. Nontuberculous mycobacterial infection or other atypical agent would be a top considerations. 3.Small bilateral pleural effusions, slightly increased from prior. There is consolidation in the lung bases bilaterally which may be due to atelectasis or additional infiltrates. 4.Increasing pericardial effusion, now measuring up to 2.6 cm posteriorly on the right. 5.Enlarged right paratracheal lymph node measuring up to 1.4 cm, likely reactive. Hilar adenopathy is also suspected, but not well characterized due to noncontrast technique. 6.Additional findings as given above. Electronically Signed: Deshawn Soto MD  1/24/2025 9:43 AM EST  Workstation ID: [id number]    XR Chest 1 View    Result Date: 1/23/2025  Impression: Patchy chronic bilateral lung infiltrates. No acute infiltrate. Electronically Signed: Amado Salmeron MD   1/23/2025 5:11 PM EST  Workstation ID: [id number]   []  EKG/Telemetry   []  Cardiology/Vascular   []  Pathology  []  Old records  []  Other:    Assessment & Plan   Assessment / Plan     Assessment:  Acute on chronic hypoxic respiratory failure  MDRO Pseudomonas pneumonia  Cystic bronchiectasis with acute exacerbation  Complicated UTI  Type 2 diabetes with hyperglycemia  CKD stage IIIa  COPD, acute exacerbation  Atrial fibrillation on Eliquis  Elevated troponin secondary to demand ischemia  HFpEF, not in acute exacerbation  History of BPH  Chronic left heel ulcer  Obstructive sleep apnea  Perianal abscess s/p I&D 2/2 Enterococcus VRE    Plan:  Continue inpatient admission  Pulmonology following.  S/p bronchoscopy 1/28/2025. BAL PCR positive for Pseudomonas.  Culture growing Pseudomonas.  Completed 7 days IV meropenem.  Continue MOIZ nebs   Wound culture grew Enterococcus VRE.  Continue IV linezolid 600 mg twice daily.  Hold trazodone and duloxetine.  Continue BuSpar.  Continue Brovana, Pulmicort, Yupelri  Continue Lantus 25 units at bedtime.  Continue medium dose sliding scale.  Continue scheduled 7 units with meals.  Creatinine stable  General Surgery following.  S/p I&D perianal abscess 1/26/2025.  Surgery canceled, planning for sometime next week.    Continue scheduled MiraLAX and stool softeners.  Add scheduled senna and milk of mag.  WBC stable.    Cardiology following.  Eliquis currently on hold for procedure.  Continue chronic indwelling Doyle catheter  Hemoglobin dropped to 6.6 today.  1 unit PRBCs ordered.  No active bleeding noted.  Check iron panel.  A.m. labs       Discussed with RN.    VTE Prophylaxis:  Pharmacologic VTE prophylaxis orders are present.        CODE STATUS:   Code Status (Patient has no pulse and is not breathing): CPR (Attempt to Resuscitate)  Medical Interventions (Patient has pulse or is breathing): Full Support      Electronically signed by Yolanda Cuevas DO, 1/31/2025, 13:44  EST.

## 2025-01-31 NOTE — PROGRESS NOTES
Pulmonary / Critical Care Progress Note      Patient Name: Preston Wallis  : 1965  MRN: [id number]  Attending:  Yolanda Cuevas*  Date of admission: 2025    Subjective   Subjective   Follow-up for acute on chronic hypoxic respiratory failure  Feels like his lungs are filling up with secretions again  On 3 L of oxygen    Review of Systems  General: Denied complaints  Cardiovascular:  Denied complaints  Respiratory: + Dyspnea on exertion; denied complaints  Gastrointestinal: Denied complaints        Objective   Objective     Vitals:   Temp:  [97.7 °F (36.5 °C)-98.4 °F (36.9 °C)] 97.7 °F (36.5 °C)  Heart Rate:  [80-88] 82  Resp:  [18] 18  BP: (138-156)/(48-65) 156/57  Flow (L/min) (Oxygen Therapy):  [3] 3    Physical Exam   Pleasant  Chronically ill  In no acute distress      Result Review    Result Review:  I have personally reviewed the results from the time of this admission to 2025 17:55 EST and agree with these findings:  []  Laboratory  []  Microbiology  []  Radiology  []  EKG/Telemetry   []  Cardiology/Vascular   []  Pathology  []  Old records  []  Other:  Most notable findings include:   -     Assessment & Plan   Assessment / Plan     Active Hospital Problems:  Active Hospital Problems    Diagnosis     **PNA (pneumonia)     1. Incision and drainage of posterior perianal abscess 2. Sharp excisional debridement through skin and subcutaneous tissue in the posterior perianal area, 9 x 6 x 1 cm     Perianal abscess     Wound of gluteal cleft     Pneumonia due to Pseudomonas species     Bronchiectasis without complication     COPD exacerbation          Impression:  Cystic bronchiectasis with acute exacerbation  History of MDR Pseudomonas  Pseudomonas pneumonia    Plan:  Having significant mucus production still  Continue antibiotics  Continue airway clearance  Add Mucomyst  Patient may need repeat bronchoscopy      VTE Prophylaxis:  Pharmacologic VTE prophylaxis orders are  present.        CODE STATUS:   Code Status (Patient has no pulse and is not breathing): CPR (Attempt to Resuscitate)  Medical Interventions (Patient has pulse or is breathing): Full Support      Labs, images, and medications personally reviewed.  Discussed with patient    Electronically signed by Walter Nicole DO, 01/31/25, 5:55 PM EST.

## 2025-01-31 NOTE — OUTREACH NOTE
[facility] End of Month Documentation    This Chronic Medical Management Care Plan for Preston Wallis, 59 y.o. male, has been monitored and managed; reviewed and a new plan of care implemented for the month of January.  A cumulative time of 30  minutes was spent on this patient record this month, including phone call with care giver; electronic communication with primary care provider; electronic communication with pharmacist; chart review.    Regarding the patient's problems: has Chronic cough; Pneumonia due to COVID-19 virus; Polyneuropathy; Paroxysmal atrial fibrillation; Obstructive sleep apnea; MRSA pneumonia; Low back pain; Chronic diastolic (congestive) heart failure; Allergies; COPD exacerbation; Chronic anticoagulation; Benign prostatic hyperplasia; Difficulty using continuous positive airway pressure (CPAP) device; Stage 3a chronic kidney disease; Iron deficiency anemia secondary to inadequate dietary iron intake; Vitamin D deficiency; Class 3 severe obesity with serious comorbidity in adult; Lower extremity edema; Elevated alkaline phosphatase level; Venous insufficiency (chronic) (peripheral); Tobacco abuse, in remission; History of Pseudomonas pneumonia; Chronic dyspnea; Gastroesophageal reflux disease; Bronchiectasis without complication; ERIC (acute kidney injury); Altered mental status; Hyperlipidemia; Luetscher's syndrome; Neurogenic bladder; Class 1 obesity; Pneumonia due to Pseudomonas species; Seizures; Primary osteoarthritis of left knee; Other constipation; Chronic obstructive pulmonary disease; Type 2 diabetes mellitus with hyperglycemia, with long-term current use of insulin; Essential hypertension; Stage 3b chronic kidney disease; Annual physical exam; Long-term use of high-risk medication; Personal history of PE (pulmonary embolism); Encounter for aftercare for healing closed traumatic fracture of left femur; Chronic pain of left knee; Primary osteoarthritis of right knee; Sepsis; Bronchiectasis with  acute exacerbation; Bacterial pneumonia; Anemia; Closed fracture of left tibial plateau; Septic joint; Septic arthritis; Skin ulcer of left heel, limited to breakdown of skin; Ulcer of left foot, limited to breakdown of skin; Left foot pain; Wheezing; Acute on chronic respiratory failure with hypercapnia; Chronic respiratory failure with hypoxia and hypercapnia; Acute hypoxic on chronic hypercapnic respiratory failure; Impaired cognition; Atherosclerosis of native arteries of the extremities with ulceration; PNA (pneumonia); Perianal abscess; 1. Incision and drainage of posterior perianal abscess 2. Sharp excisional debridement through skin and subcutaneous tissue in the posterior perianal area, 9 x 6 x 1 cm; and Wound of gluteal cleft on their problem list., the following items were addressed: medications; transitions to medical care; medical records; referrals to community service providers and any changes can be found within the plan section of the note.  A detailed listing of time spent for chronic care management is tracked within each outreach encounter.  Current medications include:  has a current medication list which includes the following prescription(s): arformoterol, aspirin low dose, atorvastatin, budesonide, bumetanide, buspirone, calcium citrate, carvedilol, vitamin d3, freestyle clau 3 plus sensor, docusate sodium, duloxetine, eliquis, famotidine, farxiga, ferrous gluconate, finasteride, folic acid, levemir, insulin lispro (1 unit dial), ipratropium-albuterol, magnesium oxide -mg supplement, montelukast, gnp one daily plus iron, nifedipine xl, o2, ozempic (1 mg/dose), tamsulosin, tiotropium, tobramycin pf, trazodone, and vitamin c, and the following Facility-Administered Medications: [Held by provider] apixaban, arformoterol, aspirin, atorvastatin, sennosides-docusate **AND** polyethylene glycol **AND** bisacodyl **AND** bisacodyl, budesonide, bumetanide, buspirone, carvedilol, dextrose, dextrose,  enoxaparin sodium, famotidine, ferrous sulfate, finasteride, glucagon, guaifenesin, heparin, hydrocodone-acetaminophen, hydromorphone, insulin glargine, insulin lispro, insulin lispro, ipratropium-albuterol, linezolid, magnesium hydroxide, montelukast, nifedipine xl, nitroglycerin, revefenacin, sodium chloride, sodium chloride, sodium chloride, sodium chloride, sodium chloride, sodium chloride, sodium chloride, sodium chloride, sodium hypochlorite, tamsulosin, tobramycin pf. and the patient is reported to be caregiver will take responsibility for med compliance; patient is compliant with medication protocol,  Medications are reported to be non-effective in controlling symptoms and changes have been made to the medication protocol.  Regarding these diagnoses, referrals were made to the following provider(s):  specialists.  All notes on chart for PCP to review.    The patient was monitored remotely for pain; medications; blood glucose; mood & behavior; activity level.    The patient's physical needs include:  help taking medications as prescribed; medication education; needs assistance with ADLs; resources for disability needs; DME supplies.     The patient's mental support needs include:  continued support    The patient's cognitive support needs include:  medication; continued support; needs assistance with ADLs; health care    The patient's psychosocial support needs include:  continued support; coordination of community providers; medication management or adherence    The patient's functional needs include: health care coverage; medication education; needs assistance for ADLs; physical healthcare; physician referral; resources for disability needs    The patient's environmental needs include:  resources for disability needs; no involvement in outside activities or no access to other activities    Care Plan overall comments:  Still not at goal, however improving. will continue to follow. Has many upcoming  appointments and referrals to specialists. A1c not at goal, continuing ways to improve with addition of new medications. Will follow, new hospitalization.    Refer to previous outreach notes for more information on the areas listed above.    Monthly Billing Diagnoses  (J96.11,  J96.12) Chronic respiratory failure with hypoxia and hypercapnia    (E11.65,  Z79.4) Type 2 diabetes mellitus with hyperglycemia, with long-term current use of insulin    (Z74.09) Impaired mobility and endurance    (D50.8) Iron deficiency anemia secondary to inadequate dietary iron intake    (N18.31) Stage 3a chronic kidney disease    (Z71.89) Medication care plan discussed with patient    Medications   Medications have been reconciled    Care Plan progress this month:      Recently Modified Care Plans Updates made since 12/31/2024 12:00 AM      No recently modified care plans.             Chronic Kidney       Track and Manage My Symptoms (Not Progressing)       Start:  12/06/24    Expected End:  06/06/25         My Symptom Management To Do List       Start:  12/06/24         Why is this important?  Keeping track of symptoms can help you feel the best. It also helps the doctor stay on top of any changes to the disease. It may also help keep your disease from getting worse. Taking simple steps can help you cope with  symptoms like feeling very tired or itchy skin.              Follow My Treatment Plan (Progressing)       Start:  12/06/24    Expected End:  06/06/25         My Treatment Plan To Do List       Start:  12/06/24         Why is this important?  Staying as healthy as you can is very important. This may mean making changes if you smoke, don't exercise or eat poorly.  A healthy lifestyle is an important goal for you.  Following the treatment plan and making changes may be hard.  Try some of these steps to help keep the disease from getting worse.              Manage My Diet (Not Progressing)       Start:  12/06/24    Expected End:   06/06/25         My Diet Management To Do List       Start:  12/06/24         Why is this important?  A healthy diet is important for mental and physical health.  Healthy food helps repair damaged body tissue and maintains strong bones and muscles.  No single food is just right so eating a variety of proteins, fruits, vegetables and grains is best.  You may need to change what you eat or drink to manage kidney disease.  A dietitian is the best person to guide you.              Optimal Care Coordination of a Patient Experiencing Chronic Kidney Disease (Progressing)       Start:  12/06/24    Expected End:  06/06/25         Support Psychological Response to Chronic Kidney Disease       Start:  12/06/24          Initial    Support Psychological Response to Chronic Kidney Disease: caregiver stress acknowledged;caregiver support provided;goal setting facilitated;positive reinforcement provided;self-care encouraged taken at 12/06/24         Facilitate Activities to Optimize Comfort and Well-Being       Start:  12/06/24          Initial    Facilitate Activities to Optimize Comfort and Well-Being: alternating periods of activity with rest promoted;sleep-hygiene practices encouraged taken at 12/06/24         Alleviate Barriers to Chronic Kidney Disease Treatment       Start:  12/06/24          Initial    Alleviate Barriers to Chronic Kidney Disease Treatment: medication side effects managed;barriers to treatment adherence identified;activity or exercise based on tolerance encouraged Reached out to nephrology office to review labs and any interventions needed.  Sent abnormal labs to oncall provider and pcp. taken at 12/06/24         Maintain or Improve Strength or Functional Ability       Start:  12/06/24          Initial    Maintain or Improve Strength or Functional Ability: activity or exercise based on tolerance encouraged;assistive or adaptive device use encouraged taken at 12/06/24         Alleviate Barriers to Optimal  Nutrition Status       Start:  12/06/24          Initial    Alleviate Barriers to Optimal Nutrition Status: diversity of foods encouraged;support and encouragement provided taken at 12/06/24         Facilitate Multi-Modal Pain Management       Start:  12/06/24          Initial    Facilitate Multi-Modal Pain Management: pain assessed taken at 12/06/24         Develop and Maintain Palliative Care Plan       Start:  12/06/24                Current Specialty Plan of Care Status signed by both patient and provider    Instructions   Patient was provided an electronic copy of care plan  CCM services were explained and offered and patient has accepted these services.  Patient has given their written consent to receive CCM services and understands that this includes the authorization of electronic communication of medical information with the other treating providers.  Patient understands that they may stop CCM services at any time and these changes will be effective at the end of the calendar month and will effectively revocate the agreement of CCM services.  Patient understands that only one practitioner can furnish and be paid for CCM services during one calendar month.  Patient also understands that there may be co-payment or deductible fees in association with CCM services.  Patient will continue with at least monthly follow-up calls with the Ambulatory .    Tara GOMEZ  Ambulatory Case Management    1/31/2025, 13:06 EST

## 2025-02-01 LAB
ANION GAP SERPL CALCULATED.3IONS-SCNC: 5.5 MMOL/L (ref 5–15)
BACTERIA SPEC ANAEROBE CULT: ABNORMAL
BASOPHILS # BLD AUTO: 0.01 10*3/MM3 (ref 0–0.2)
BASOPHILS NFR BLD AUTO: 0.1 % (ref 0–1.5)
BH BB BLOOD EXPIRATION DATE: NORMAL
BH BB BLOOD TYPE BARCODE: 9500
BH BB DISPENSE STATUS: NORMAL
BH BB PRODUCT CODE: NORMAL
BH BB UNIT NUMBER: NORMAL
BUN SERPL-MCNC: 86 MG/DL (ref 6–20)
BUN/CREAT SERPL: 42 (ref 7–25)
CALCIUM SPEC-SCNC: 8.6 MG/DL (ref 8.6–10.5)
CHLORIDE SERPL-SCNC: 95 MMOL/L (ref 98–107)
CO2 SERPL-SCNC: 38.5 MMOL/L (ref 22–29)
CREAT SERPL-MCNC: 2.05 MG/DL (ref 0.76–1.27)
CROSSMATCH INTERPRETATION: NORMAL
DEPRECATED RDW RBC AUTO: 44.7 FL (ref 37–54)
EGFRCR SERPLBLD CKD-EPI 2021: 36.6 ML/MIN/1.73
EOSINOPHIL # BLD AUTO: 0.6 10*3/MM3 (ref 0–0.4)
EOSINOPHIL NFR BLD AUTO: 4.3 % (ref 0.3–6.2)
ERYTHROCYTE [DISTWIDTH] IN BLOOD BY AUTOMATED COUNT: 14.3 % (ref 12.3–15.4)
GLUCOSE BLDC GLUCOMTR-MCNC: 118 MG/DL (ref 70–99)
GLUCOSE BLDC GLUCOMTR-MCNC: 135 MG/DL (ref 70–99)
GLUCOSE BLDC GLUCOMTR-MCNC: 144 MG/DL (ref 70–99)
GLUCOSE BLDC GLUCOMTR-MCNC: 158 MG/DL (ref 70–99)
GLUCOSE BLDC GLUCOMTR-MCNC: 51 MG/DL (ref 70–99)
GLUCOSE SERPL-MCNC: 93 MG/DL (ref 65–99)
HCT VFR BLD AUTO: 24 % (ref 37.5–51)
HGB BLD-MCNC: 7.5 G/DL (ref 13–17.7)
IMM GRANULOCYTES # BLD AUTO: 0.16 10*3/MM3 (ref 0–0.05)
IMM GRANULOCYTES NFR BLD AUTO: 1.1 % (ref 0–0.5)
LYMPHOCYTES # BLD AUTO: 1.62 10*3/MM3 (ref 0.7–3.1)
LYMPHOCYTES NFR BLD AUTO: 11.5 % (ref 19.6–45.3)
MCH RBC QN AUTO: 27.6 PG (ref 26.6–33)
MCHC RBC AUTO-ENTMCNC: 31.3 G/DL (ref 31.5–35.7)
MCV RBC AUTO: 88.2 FL (ref 79–97)
MONOCYTES # BLD AUTO: 1.24 10*3/MM3 (ref 0.1–0.9)
MONOCYTES NFR BLD AUTO: 8.8 % (ref 5–12)
NEUTROPHILS NFR BLD AUTO: 10.43 10*3/MM3 (ref 1.7–7)
NEUTROPHILS NFR BLD AUTO: 74.2 % (ref 42.7–76)
NRBC BLD AUTO-RTO: 0 /100 WBC (ref 0–0.2)
PLATELET # BLD AUTO: 272 10*3/MM3 (ref 140–450)
PMV BLD AUTO: 9.3 FL (ref 6–12)
POTASSIUM SERPL-SCNC: 3.8 MMOL/L (ref 3.5–5.2)
RBC # BLD AUTO: 2.72 10*6/MM3 (ref 4.14–5.8)
SODIUM SERPL-SCNC: 139 MMOL/L (ref 136–145)
UNIT  ABO: NORMAL
UNIT  RH: NORMAL
WBC NRBC COR # BLD AUTO: 14.06 10*3/MM3 (ref 3.4–10.8)

## 2025-02-01 PROCEDURE — 63710000001 INSULIN GLARGINE PER 5 UNITS: Performed by: STUDENT IN AN ORGANIZED HEALTH CARE EDUCATION/TRAINING PROGRAM

## 2025-02-01 PROCEDURE — 99233 SBSQ HOSP IP/OBS HIGH 50: CPT | Performed by: INTERNAL MEDICINE

## 2025-02-01 PROCEDURE — 25010000002 ENOXAPARIN PER 10 MG: Performed by: INTERNAL MEDICINE

## 2025-02-01 PROCEDURE — 94799 UNLISTED PULMONARY SVC/PX: CPT

## 2025-02-01 PROCEDURE — 25010000002 PIPERACILLIN SOD-TAZOBACTAM PER 1 G: Performed by: STUDENT IN AN ORGANIZED HEALTH CARE EDUCATION/TRAINING PROGRAM

## 2025-02-01 PROCEDURE — 25010000002 LINEZOLID 600 MG/300ML SOLUTION: Performed by: STUDENT IN AN ORGANIZED HEALTH CARE EDUCATION/TRAINING PROGRAM

## 2025-02-01 PROCEDURE — 63710000001 INSULIN LISPRO (HUMAN) PER 5 UNITS: Performed by: STUDENT IN AN ORGANIZED HEALTH CARE EDUCATION/TRAINING PROGRAM

## 2025-02-01 PROCEDURE — 63710000001 REVEFENACIN 175 MCG/3ML SOLUTION: Performed by: INTERNAL MEDICINE

## 2025-02-01 PROCEDURE — 63710000001 INSULIN LISPRO (HUMAN) PER 5 UNITS: Performed by: INTERNAL MEDICINE

## 2025-02-01 PROCEDURE — 94664 DEMO&/EVAL PT USE INHALER: CPT

## 2025-02-01 PROCEDURE — 82948 REAGENT STRIP/BLOOD GLUCOSE: CPT

## 2025-02-01 PROCEDURE — 82948 REAGENT STRIP/BLOOD GLUCOSE: CPT | Performed by: INTERNAL MEDICINE

## 2025-02-01 PROCEDURE — 80048 BASIC METABOLIC PNL TOTAL CA: CPT | Performed by: INTERNAL MEDICINE

## 2025-02-01 PROCEDURE — 85025 COMPLETE CBC W/AUTO DIFF WBC: CPT | Performed by: STUDENT IN AN ORGANIZED HEALTH CARE EDUCATION/TRAINING PROGRAM

## 2025-02-01 PROCEDURE — 99232 SBSQ HOSP IP/OBS MODERATE 35: CPT | Performed by: STUDENT IN AN ORGANIZED HEALTH CARE EDUCATION/TRAINING PROGRAM

## 2025-02-01 RX ADMIN — TOBRAMYCIN 300 MG: 300 SOLUTION RESPIRATORY (INHALATION) at 09:46

## 2025-02-01 RX ADMIN — LINEZOLID 600 MG: 600 INJECTION, SOLUTION INTRAVENOUS at 10:38

## 2025-02-01 RX ADMIN — GUAIFENESIN 600 MG: 600 TABLET ORAL at 10:37

## 2025-02-01 RX ADMIN — PIPERACILLIN AND TAZOBACTAM 3.38 G: 3; .375 INJECTION, POWDER, FOR SOLUTION INTRAVENOUS at 15:58

## 2025-02-01 RX ADMIN — BISACODYL 5 MG: 5 TABLET, COATED ORAL at 09:25

## 2025-02-01 RX ADMIN — ASPIRIN 81 MG: 81 TABLET, COATED ORAL at 06:08

## 2025-02-01 RX ADMIN — POLYETHYLENE GLYCOL 3350 17 G: 17 POWDER, FOR SOLUTION ORAL at 10:37

## 2025-02-01 RX ADMIN — SENNOSIDES AND DOCUSATE SODIUM 2 TABLET: 50; 8.6 TABLET ORAL at 09:25

## 2025-02-01 RX ADMIN — INSULIN LISPRO 2 UNITS: 100 INJECTION, SOLUTION INTRAVENOUS; SUBCUTANEOUS at 21:37

## 2025-02-01 RX ADMIN — TAMSULOSIN HYDROCHLORIDE 0.4 MG: 0.4 CAPSULE ORAL at 10:37

## 2025-02-01 RX ADMIN — INSULIN LISPRO 7 UNITS: 100 INJECTION, SOLUTION INTRAVENOUS; SUBCUTANEOUS at 09:25

## 2025-02-01 RX ADMIN — FINASTERIDE 5 MG: 5 TABLET, FILM COATED ORAL at 10:37

## 2025-02-01 RX ADMIN — ARFORMOTEROL TARTRATE 15 MCG: 15 SOLUTION RESPIRATORY (INHALATION) at 09:46

## 2025-02-01 RX ADMIN — CARVEDILOL 25 MG: 25 TABLET, FILM COATED ORAL at 20:08

## 2025-02-01 RX ADMIN — MAGNESIUM HYDROXIDE 10 ML: 2400 SUSPENSION ORAL at 09:25

## 2025-02-01 RX ADMIN — INSULIN LISPRO 7 UNITS: 100 INJECTION, SOLUTION INTRAVENOUS; SUBCUTANEOUS at 17:08

## 2025-02-01 RX ADMIN — REVEFENACIN 175 MCG: 175 SOLUTION RESPIRATORY (INHALATION) at 09:46

## 2025-02-01 RX ADMIN — MONTELUKAST 10 MG: 10 TABLET, FILM COATED ORAL at 20:08

## 2025-02-01 RX ADMIN — NIFEDIPINE 30 MG: 30 TABLET, FILM COATED, EXTENDED RELEASE ORAL at 06:09

## 2025-02-01 RX ADMIN — LINEZOLID 600 MG: 600 INJECTION, SOLUTION INTRAVENOUS at 20:08

## 2025-02-01 RX ADMIN — SODIUM HYPOCHLORITE: 1.25 SOLUTION TOPICAL at 21:38

## 2025-02-01 RX ADMIN — ARFORMOTEROL TARTRATE 15 MCG: 15 SOLUTION RESPIRATORY (INHALATION) at 21:26

## 2025-02-01 RX ADMIN — HYDROCODONE BITARTRATE AND ACETAMINOPHEN 1 TABLET: 5; 325 TABLET ORAL at 06:09

## 2025-02-01 RX ADMIN — BUSPIRONE HYDROCHLORIDE 15 MG: 5 TABLET ORAL at 10:37

## 2025-02-01 RX ADMIN — BUSPIRONE HYDROCHLORIDE 15 MG: 5 TABLET ORAL at 20:07

## 2025-02-01 RX ADMIN — SODIUM HYPOCHLORITE: 1.25 SOLUTION TOPICAL at 16:10

## 2025-02-01 RX ADMIN — BUDESONIDE 0.5 MG: 0.5 INHALANT RESPIRATORY (INHALATION) at 21:26

## 2025-02-01 RX ADMIN — INSULIN GLARGINE 25 UNITS: 100 INJECTION, SOLUTION SUBCUTANEOUS at 21:37

## 2025-02-01 RX ADMIN — Medication 10 ML: at 20:08

## 2025-02-01 RX ADMIN — BUMETANIDE 2 MG: 1 TABLET ORAL at 10:37

## 2025-02-01 RX ADMIN — GUAIFENESIN 600 MG: 600 TABLET ORAL at 20:08

## 2025-02-01 RX ADMIN — INSULIN LISPRO 7 UNITS: 100 INJECTION, SOLUTION INTRAVENOUS; SUBCUTANEOUS at 12:12

## 2025-02-01 RX ADMIN — FAMOTIDINE 40 MG: 20 TABLET ORAL at 06:08

## 2025-02-01 RX ADMIN — PIPERACILLIN AND TAZOBACTAM 3.38 G: 3; .375 INJECTION, POWDER, FOR SOLUTION INTRAVENOUS at 21:29

## 2025-02-01 RX ADMIN — TOBRAMYCIN 300 MG: 300 SOLUTION RESPIRATORY (INHALATION) at 21:26

## 2025-02-01 RX ADMIN — ATORVASTATIN CALCIUM 20 MG: 10 TABLET, FILM COATED ORAL at 10:37

## 2025-02-01 RX ADMIN — ENOXAPARIN SODIUM 40 MG: 100 INJECTION SUBCUTANEOUS at 20:08

## 2025-02-01 RX ADMIN — Medication 10 ML: at 21:38

## 2025-02-01 RX ADMIN — FERROUS SULFATE TAB 325 MG (65 MG ELEMENTAL FE) 325 MG: 325 (65 FE) TAB at 09:25

## 2025-02-01 RX ADMIN — Medication 10 ML: at 10:38

## 2025-02-01 RX ADMIN — BUDESONIDE 0.5 MG: 0.5 INHALANT RESPIRATORY (INHALATION) at 09:46

## 2025-02-01 RX ADMIN — BUMETANIDE 2 MG: 1 TABLET ORAL at 20:07

## 2025-02-01 RX ADMIN — HYDROCODONE BITARTRATE AND ACETAMINOPHEN 1 TABLET: 5; 325 TABLET ORAL at 20:09

## 2025-02-01 RX ADMIN — CARVEDILOL 25 MG: 25 TABLET, FILM COATED ORAL at 10:37

## 2025-02-01 NOTE — PLAN OF CARE
Goal Outcome Evaluation:  Plan of Care Reviewed With: patient        Progress: no change  Outcome Evaluation: Patient given 1 unit of PRBC this shift. Patient c/o pain to his coccyx; medicated per MAR. Blood glucose WNL. VSS, will continue plan of care.

## 2025-02-01 NOTE — PLAN OF CARE
Goal Outcome Evaluation:              Outcome Evaluation: No problems or complaints this shift.  wound care to coccyx as per order.  plan of care ongoing.

## 2025-02-01 NOTE — PROGRESS NOTES
Pulmonary / Critical Care Progress Note      Patient Name: Preston Wallis  : 1965  MRN: [id number]  Attending:  Yolanda Cuevas*  Date of admission: 2025    Subjective   Subjective   Follow-up for acute on chronic hypoxic respiratory failure    Over the last 24 hours:     This morning,  Currently on 3 L nasal cannula  Remains bedridden  Denies any chest pain, but does report some chest tightness  Reports he feels like he is feeling up with secretions again  Unable to clear secretions  No fever or chills  Nausea or vomiting  Awaiting ostomy surgery    Objective   Objective     Vitals:   Temp:  [97.7 °F (36.5 °C)-98.4 °F (36.9 °C)] 97.7 °F (36.5 °C)  Heart Rate:  [80-89] 89  Resp:  [16-20] 20  BP: (138-156)/(48-65) 149/49  Flow (L/min) (Oxygen Therapy):  [3] 3    Physical Exam   Vital Signs Reviewed   General:  WDWN, Alert, NAD.  Chronically ill-appearing male, lying in bed  HEENT:  PERRL, EOMI.  OP, nares clear, no sinus tenderness  Neck:  Supple, no JVD, no thyromegaly  Chest: Improved aeration with bibasilar crackles and rhonchi, diminished right chest, equal rise and fall bilaterally, on 2 L  CV: RRR, no MGR, pulses 2+, equal.  Abd:  Soft, NT, ND, + BS, no HSM, obese  EXT:  no clubbing, no cyanosis, BLE edema  Neuro:  A&Ox3, CN grossly intact, no focal deficits.  Skin: No rashes or lesions noted      Result Review    Result Review:  I have personally reviewed the results from the time of this admission to 2025 06:59 EST and agree with these findings:  [x]  Laboratory  [x]  Microbiology  [x]  Radiology  []  EKG/Telemetry   []  Cardiology/Vascular   []  Pathology  []  Old records  []  Other:  Most notable findings include:         Lab 25  0600 25  0609 25  0644 25  0417 25  0303 25  0549   WBC 14.06* 13.50* 15.99* 16.99*  --  14.02*   HEMOGLOBIN 7.5* 6.6* 7.1* 7.2*  --  7.4*   HEMATOCRIT 24.0* 21.7* 23.0* 24.2*  --  24.9*   PLATELETS 272 299 321 313  --   383   SODIUM 139 141 139 137 139 141   POTASSIUM 3.8 4.1 3.9 4.4 4.8 4.7   CHLORIDE 95* 98 99 99 100 102   CO2 38.5* 37.0* 32.0* 30.3* 29.4* 29.4*   BUN 86* 95* 100* 98* 100* 90*   CREATININE 2.05* 2.28* 2.41* 2.39* 2.54* 2.22*   GLUCOSE 93 85 114* 263* 388* 389*   CALCIUM 8.6 8.4* 7.9* 7.9* 7.8* 7.9*   PHOSPHORUS  --  3.3  --   --   --   --        Assessment & Plan   Assessment / Plan     Active Hospital Problems:  Active Hospital Problems    Diagnosis     **PNA (pneumonia)     1. Incision and drainage of posterior perianal abscess 2. Sharp excisional debridement through skin and subcutaneous tissue in the posterior perianal area, 9 x 6 x 1 cm     Perianal abscess     Wound of gluteal cleft     Pneumonia due to Pseudomonas species     Bronchiectasis without complication     COPD exacerbation        Impression:  Cystic bronchiectasis with acute exacerbation  History of MDR Pseudomonas  Pseudomonas pneumonia  Acute on chronic hypoxemic and hypercapnic respiratory failure requiring NIPPV  Severe COPD without exacerbation, FEV1 26%  Obesity hypoventilation syndrome  Acute on chronic decompensated congestive diastolic heart failure  Acute cardiogenic pulmonary edema  Recurrent Pseudomonas pneumonia, resistant to Levaquin  Altered mental status  CO2 narcosis  Anasarca  Anemia  CKD  Hypocalcemia  Medical noncompliance of NIV  History of MILADY  Tobacco abuse in remission    PLAN:  -Continue to maintain SpO2 greater than 90%  -S/p bronchoscopy on 1/28 positive for Pseudomonas  -Eliquis remains on hold  -Continue Brovana, Pulmicort, DuoNebs  -Continue MOIZ nebs  -Start bronchopulmonary hygiene.  Encourage I-S and flutter  -Chest physiotherapy available  -Encourage activity as tolerated  -Patient awaiting colostomy sometimes next week  -Patient reporting he feels like his secretions are building up again and unable to clear them.  The service will consider bronchoscopy sometime next week    VTE Prophylaxis:  Pharmacologic VTE  prophylaxis orders are present.    CODE STATUS:   Code Status (Patient has no pulse and is not breathing): CPR (Attempt to Resuscitate)  Medical Interventions (Patient has pulse or is breathing): Full Support    I have reviewed labs, imaging, pertinent clinical data and provider notes.   I have discussed with bedside nurse and primary service.     Electronically signed by CON Sampson, 02/01/25, 1:18 PM EST.  Electronically signed by Martín Rivera MD, 02/01/25, 6:59 AM EST.    This visit was performed by BOTH a physician and an APC. I personally evaluated and examined the patient. I performed all aspects of MDM as documented. , I have reviewed and confirmed the accuracy of the patient's history as documented in this note., and I have reexamined the patient and the results are consistent with the previously documented exam. I have updated the documentation as necessary.     Electronically signed by Martín Rivera MD, 02/01/25, 3:54 PM EST.

## 2025-02-01 NOTE — PROGRESS NOTES
Respiratory Therapist Broncho-Pulmonary Hygiene Progress Note      Patient Name:  Preston Wallis  YOB: 1965    Preston Wallis meets the qualification for Level 3 of the Bronco-Pulmonary Hygiene Protocol. This was based on my daily patient assessment and includes review of chest x-ray results, cough ability and quality, oxygenation, secretions or risk for secretion development and patient mobility.     Broncho-Pulmonary Hygiene Assessment:    Level of Movement: Actively changing positions-requires assistance  Disoriented/Follows Commands    Breath Sounds: Diminished and/or coarse rhonchi    Cough: Strong, effective    Chest X-Ray: Possible signs of consolidation and/or atelectasis or clear.     Sputum Productions: None or small amount of thin or watery secretions with effective cough    History and Physical: Home use of oxygen     SpO2 to Oxygen Need: greater than 92% on room air or  less than 3L nasal canula    Current SpO2 is: 96% on 3L nasal cannula     Based on this information, I have completed the following interventions: CPT (vest)      Electronically signed by Liudmila Harley RRT, 02/01/25, 1:20 PM EST.

## 2025-02-01 NOTE — PROGRESS NOTES
[facility]   Hospitalist Progress Note  Date: 2025  Patient Name: Preston Wallis  : 1965  MRN: [id number]  Date of admission: 2025  Room/Bed: 360/1      Subjective   Subjective     Chief Complaint: SOB    Summary:Preston Wallis is a 59 y.o. male past medical history of COPD, hypertension, CHF, history of MDRO presents to the ED due to shortness of breath. Patient was discharged on  for MDRO pseudomonas pneumonia on IV ertapenem.     Patient admitted for shortness of breath.  Chest x-ray shows chronic bilateral lung infiltrates.  CT was performed to arrival of the chest which demonstrates new spiculated left lower lobe nodule, bronchiectasis throughout both lungs, micronodule or peribronchial densities suggestive of atypical infection, right paratracheal lymph node and enlarging pericardial effusion.  Pulmonology and cardiology services consulted.  TTE demonstrates very small pericardial effusion only, estimated EF 49%, with some grade 1 diastolic dysfunction left ventricle.  Wound care noted necrotic tissue perianal region.  CT abdomen pelvis was performed, 4.5 cm x 1.8 cm x 3.5 cm posterior anal abscess noted.  General surgery consulted, patient underwent incision and drainage .  S/p bronchoscopy 2025, BAL growing Pseudomonas.    Interval Followup: No acute overnight events.  No acute distress.  Patient resting comfortably bed with family at bedside.  Discussed his lab work today.  He reports his respiratory status is about the same.  Answered all questions and concerns.    Objective   Objective     Vitals:   Temp:  [97.7 °F (36.5 °C)-98.4 °F (36.9 °C)] 98.2 °F (36.8 °C)  Heart Rate:  [82-91] 89  Resp:  [16-22] 18  BP: (147-156)/(49-61) 148/54  Flow (L/min) (Oxygen Therapy):  [3] 3    Physical Exam   Gen: NAD, Alert and Oriented  Pulm: Nasal cannula in place, no respiratory distress  Abd: soft, nondistended  Extremities: no pitting edema    Result Review    Result Review:  I  have personally reviewed these results:  [x]  Laboratory      Lab 02/01/25  0600 01/31/25  0609 01/30/25  0644   WBC 14.06* 13.50* 15.99*   HEMOGLOBIN 7.5* 6.6* 7.1*   HEMATOCRIT 24.0* 21.7* 23.0*   PLATELETS 272 299 321   NEUTROS ABS 10.43* 10.00* 11.55*   IMMATURE GRANS (ABS) 0.16* 0.15* 0.45*   LYMPHS ABS 1.62 1.65 2.04   MONOS ABS 1.24* 1.23* 1.75*   EOS ABS 0.60* 0.46* 0.19   MCV 88.2 88.9 88.1         Lab 02/01/25  0600 01/31/25  0609 01/30/25  0644   SODIUM 139 141 139   POTASSIUM 3.8 4.1 3.9   CHLORIDE 95* 98 99   CO2 38.5* 37.0* 32.0*   ANION GAP 5.5 6.0 8.0   BUN 86* 95* 100*   CREATININE 2.05* 2.28* 2.41*   EGFR 36.6* 32.2* 30.2*   GLUCOSE 93 85 114*   CALCIUM 8.6 8.4* 7.9*   MAGNESIUM  --  1.8  --    PHOSPHORUS  --  3.3  --                            Lab 01/31/25  0859 01/31/25  0609   IRON  --  31*   IRON SATURATION (TSAT)  --  17*   TIBC  --  185*   TRANSFERRIN  --  124*   FERRITIN  --  608.60*   ABO TYPING O  --    RH TYPING Negative  --    ANTIBODY SCREEN Negative  --            Brief Urine Lab Results  (Last result in the past 365 days)        Color   Clarity   Blood   Leuk Est   Nitrite   Protein   CREAT   Urine HCG        01/23/25 1912 Yellow   Turbid   Large (3+)   Moderate (2+)   Negative   >=300 mg/dL (3+)                 [x]  Microbiology   Microbiology Results (last 10 days)       Procedure Component Value - Date/Time    AFB Culture - Lavage, Lung, Right Upper Lobe [[id number]] Collected: 01/28/25 0847    Lab Status: Preliminary result Specimen: Lavage from Lung, Right Upper Lobe Updated: 01/29/25 1216     AFB Stain No acid fast bacilli seen on direct smear      No acid fast bacilli seen on concentrated smear    BAL Culture, Quantitative - Lavage, Lung, Right Upper Lobe [[id number]]  (Abnormal) Collected: 01/28/25 0874    Lab Status: Preliminary result Specimen: Lavage from Lung, Right Upper Lobe Updated: 02/01/25 1035     BAL Culture >100,000 CFU/mL Pseudomonas aeruginosa     Comment: MICS to  follow 2.1         No Normal Respiratory Nola     Gram Stain Moderate (3+) WBCs seen      Rare (1+) Gram positive cocci in clusters    Pneumonia Panel - Lavage, Lung, Right Upper Lobe [[id number]]  (Abnormal) Collected: 01/28/25 0847    Lab Status: Final result Specimen: Lavage from Lung, Right Upper Lobe Updated: 01/28/25 1120     Escherichia coli PCR Not Detected     Acinetobacter calcoaceticus-baumannii complex PCR Not Detected     Enterobacter cloacae PCR Not Detected     Klebsiella oxytoca PCR Not Detected     Klebsiella pneumoniae group PCR Not Detected     Klebsiella aerogenes PCR Not Detected     Moraxella catarrhalis PCR Not Detected     Proteus species PCR Not Detected     Pseudomonas aeroginosa PCR Detected     Comment: >=10^7 Bin copies/mL        Serratia marcescens PCR Not Detected     Staphylococcus aureus PCR Not Detected     Streptococcus pyogenes PCR Not Detected     Haemophilus influenzae PCR Not Detected     Streptococcus agalactiae PCR Not Detected     Streptococcus pneumoniae PCR Not Detected     Chlamydophila pneumoniae PCR Not Detected     Legionella pneumophilia PCR Not Detected     Mycoplasma pneumo by PCR Not Detected     ADENOVIRUS, PCR Not Detected     CTX-M Gene Not Detected     IMP Gene Not Detected     KPC Gene Not Detected     mecA/C and MREJ Gene N/A     NDM Gene Not Detected     OXA-48-like Gene N/A     VIM Gene Not Detected     Coronavirus Not Detected     Human Metapneumovirus Not Detected     Human Rhinovirus/Enterovirus Not Detected     Influenza A PCR Not Detected     Influenza B PCR Not Detected     RSV, PCR Not Detected     Parainfluenza virus PCR Not Detected    Anaerobic Culture - Surgical Site, Perirectal Abscess [[id number]]  (Abnormal) Collected: 01/26/25 1629    Lab Status: Final result Specimen: Surgical Site from Perirectal Abscess Updated: 02/01/25 0802     Anaerobic Culture Mixed Anaerobic Organisms    Fungus Culture - Surgical Site, Perirectal Abscess [[id number]]  Collected: 01/26/25 1629    Lab Status: Preliminary result Specimen: Surgical Site from Perirectal Abscess Updated: 01/31/25 1815     Fungus Culture No fungus isolated at less than 1 week    Wound Culture - Surgical Site, Perirectal Abscess [[id number]]  (Abnormal)  (Susceptibility) Collected: 01/26/25 1629    Lab Status: Final result Specimen: Surgical Site from Perirectal Abscess Updated: 01/30/25 0938     Wound Culture Light growth (2+) Enterococcus faecium, VRE     Comment:   Vancomycin Resistant Enterococcus species. Patient may be an isolation risk.         Scant growth (1+) Citrobacter amalonaticus     Gram Stain Few (2+) WBCs seen      Rare (1+) Gram positive cocci in pairs and chains    Susceptibility        Enterococcus faecium, VRE      MARIO      Ampicillin Resistant      Linezolid Susceptible      Vancomycin Resistant                       Susceptibility        Citrobacter amalonaticus      MARIO      Amikacin Susceptible      Amoxicillin + Clavulanate Resistant      Cefazolin (Non Urine) Resistant      Cefepime Resistant      Ceftazidime Resistant      Ceftriaxone Resistant      Ciprofloxacin Resistant      Gentamicin Resistant      Levofloxacin Intermediate      Piperacillin + Tazobactam Susceptible      Tetracycline Resistant      Tobramycin Resistant      Trimethoprim + Sulfamethoxazole Resistant                       Susceptibility Comments       Citrobacter amalonaticus    With the exception of urinary-sourced infections, aminoglycosides should not be used as monotherapy.               AFB Culture - Surgical Site, Perirectal Abscess [[id number]] Collected: 01/26/25 1629    Lab Status: Preliminary result Specimen: Surgical Site from Perirectal Abscess Updated: 01/31/25 1815     AFB Culture No AFB isolated at less than 1 week     AFB Stain No acid fast bacilli seen    Respiratory Culture - Sputum, Cough [[id number]]  (Abnormal) Collected: 01/24/25 1618    Lab Status: Preliminary result Specimen: Sputum  from Cough Updated: 01/28/25 0952     Respiratory Culture Moderate growth (3+) Pseudomonas aeruginosa      Scant growth (1+) Normal Respiratory Nola     Gram Stain Few (2+) WBCs seen      Rare (1+) Gram positive cocci      Rare (1+) Gram negative bacilli    Narrative:      Sending to [facility] for [facility] 1/28/25      AFB Culture - Sputum, Cough [[id number]] Collected: 01/24/25 1618    Lab Status: Preliminary result Specimen: Sputum from Cough Updated: 01/31/25 1631     AFB Culture No AFB isolated at 1 week     AFB Stain No acid fast bacilli seen on direct smear      No acid fast bacilli seen on concentrated smear    S. Pneumo Ag Urine or CSF - Urine, Indwelling Urethral Catheter [[id number]]  (Normal) Collected: 01/23/25 1912    Lab Status: Final result Specimen: Urine from Indwelling Urethral Catheter Updated: 01/24/25 1646     Strep Pneumo Ag Negative    Legionella Antigen, Urine - Urine, Indwelling Urethral Catheter [[id number]]  (Normal) Collected: 01/23/25 1912    Lab Status: Final result Specimen: Urine from Indwelling Urethral Catheter Updated: 01/24/25 1646     LEGIONELLA ANTIGEN, URINE Negative    Blood Culture - Blood, Arm, Right [[id number]]  (Normal) Collected: 01/23/25 1748    Lab Status: Final result Specimen: Blood from Arm, Right Updated: 01/28/25 1800     Blood Culture No growth at 5 days    Narrative:      Less than seven (7) mL's of blood was collected.  Insufficient quantity may yield false negative results.    Blood Culture - Blood, Hand, Left [[id number]]  (Normal) Collected: 01/23/25 1735    Lab Status: Final result Specimen: Blood from Hand, Left Updated: 01/28/25 1745     Blood Culture No growth at 5 days    Narrative:      Less than seven (7) mL's of blood was collected.  Insufficient quantity may yield false negative results.    COVID-19, FLU A/B, RSV PCR 1 HR TAT - Swab, Nasopharynx [[id number]]  (Normal) Collected: 01/23/25 1723    Lab Status: Final result Specimen: Swab from Nasopharynx Updated:  01/23/25 1806     COVID19 Not Detected     Influenza A PCR Not Detected     Influenza B PCR Not Detected     RSV, PCR Not Detected    Narrative:      Fact sheet for providers: https://www.fda.gov/media/404924/download    Fact sheet for patients: https://www.fda.gov/media/350260/download    Test performed by PCR.          [x]  Radiology  CT Abdomen Pelvis Without Contrast    Result Date: 1/25/2025  Impression: 1.There is a posterior perianal abscess as detailed above. No intrapelvic extension. 2.Cardiomegaly with relatively small pericardial effusion. There is bibasilar atelectasis with trace pleural effusions. 3.Other incidental nonemergent findings as detailed above. Electronically Signed: Rob Milner MD  1/25/2025 10:55 AM EST  Workstation ID: [id number]   []  EKG/Telemetry   []  Cardiology/Vascular   []  Pathology  []  Old records  []  Other:    Assessment & Plan   Assessment / Plan     Assessment:  Acute on chronic hypoxic respiratory failure  MDRO Pseudomonas pneumonia  Cystic bronchiectasis with acute exacerbation  Complicated UTI  Type 2 diabetes with hyperglycemia  CKD stage IIIa  COPD, acute exacerbation  Atrial fibrillation on Eliquis  Elevated troponin secondary to demand ischemia  HFpEF, not in acute exacerbation  History of BPH  Chronic left heel ulcer  Obstructive sleep apnea  Perianal abscess s/p I&D 2/2 Enterococcus VRE    Plan:  Continue inpatient admission  Pulmonology following.  S/p bronchoscopy 1/28/2025. BAL PCR positive for Pseudomonas.  Culture growing Pseudomonas.  Completed 7 days IV meropenem.  Continue MOIZ nebs   Wound culture grew Enterococcus VRE.  Continue IV linezolid 600 mg twice daily.  Hold trazodone and duloxetine.  Continue BuSpar.  Wound culture now growing multidrug-resistant Citrobacter.  Start IV Zosyn.  Continue Brovana, Pulmicort, Yupelri  Continue Lantus 25 units at bedtime.  Continue medium dose sliding scale.  Continue scheduled 7 units with meals.  Creatinine  improving  General Surgery following.  S/p I&D perianal abscess 1/26/2025.  Surgery canceled, planning for sometime next week.    Continue scheduled MiraLAX and stool softeners.  Add scheduled senna and milk of mag.  WBC 14  Cardiology following.  Eliquis currently on hold for procedure.  Continue chronic indwelling Doyle catheter  Hemoglobin improved to 7.5.  S/p 1 unit PRBCs.  No active bleeding noted.  Iron panel is low but elevated ferritin  A.m. labs       Discussed with RN.    VTE Prophylaxis:  Pharmacologic VTE prophylaxis orders are present.        CODE STATUS:   Code Status (Patient has no pulse and is not breathing): CPR (Attempt to Resuscitate)  Medical Interventions (Patient has pulse or is breathing): Full Support      Electronically signed by Yolanda Cuevas DO, 2/1/2025, 14:17 EST.

## 2025-02-02 LAB
ANION GAP SERPL CALCULATED.3IONS-SCNC: 3.5 MMOL/L (ref 5–15)
BACTERIA SPEC RESP CULT: ABNORMAL
BACTERIA SPEC RESP CULT: ABNORMAL
BASOPHILS # BLD AUTO: 0.01 10*3/MM3 (ref 0–0.2)
BASOPHILS NFR BLD AUTO: 0.1 % (ref 0–1.5)
BUN SERPL-MCNC: 71 MG/DL (ref 6–20)
BUN/CREAT SERPL: 33.3 (ref 7–25)
CALCIUM SPEC-SCNC: 8.7 MG/DL (ref 8.6–10.5)
CHLORIDE SERPL-SCNC: 95 MMOL/L (ref 98–107)
CO2 SERPL-SCNC: 41.5 MMOL/L (ref 22–29)
CREAT SERPL-MCNC: 2.13 MG/DL (ref 0.76–1.27)
DEPRECATED RDW RBC AUTO: 46.1 FL (ref 37–54)
EGFRCR SERPLBLD CKD-EPI 2021: 35 ML/MIN/1.73
EOSINOPHIL # BLD AUTO: 0.56 10*3/MM3 (ref 0–0.4)
EOSINOPHIL NFR BLD AUTO: 5.2 % (ref 0.3–6.2)
ERYTHROCYTE [DISTWIDTH] IN BLOOD BY AUTOMATED COUNT: 14.1 % (ref 12.3–15.4)
GLUCOSE BLDC GLUCOMTR-MCNC: 113 MG/DL (ref 70–99)
GLUCOSE BLDC GLUCOMTR-MCNC: 199 MG/DL (ref 70–99)
GLUCOSE BLDC GLUCOMTR-MCNC: 204 MG/DL (ref 70–99)
GLUCOSE BLDC GLUCOMTR-MCNC: 219 MG/DL (ref 70–99)
GLUCOSE SERPL-MCNC: 140 MG/DL (ref 65–99)
GRAM STN SPEC: ABNORMAL
HCT VFR BLD AUTO: 22 % (ref 37.5–51)
HGB BLD-MCNC: 6.7 G/DL (ref 13–17.7)
IMM GRANULOCYTES # BLD AUTO: 0.1 10*3/MM3 (ref 0–0.05)
IMM GRANULOCYTES NFR BLD AUTO: 0.9 % (ref 0–0.5)
LYMPHOCYTES # BLD AUTO: 1.67 10*3/MM3 (ref 0.7–3.1)
LYMPHOCYTES NFR BLD AUTO: 15.5 % (ref 19.6–45.3)
MCH RBC QN AUTO: 27.7 PG (ref 26.6–33)
MCHC RBC AUTO-ENTMCNC: 30.5 G/DL (ref 31.5–35.7)
MCV RBC AUTO: 90.9 FL (ref 79–97)
MONOCYTES # BLD AUTO: 1.12 10*3/MM3 (ref 0.1–0.9)
MONOCYTES NFR BLD AUTO: 10.4 % (ref 5–12)
NEUTROPHILS NFR BLD AUTO: 67.9 % (ref 42.7–76)
NEUTROPHILS NFR BLD AUTO: 7.29 10*3/MM3 (ref 1.7–7)
NRBC BLD AUTO-RTO: 0 /100 WBC (ref 0–0.2)
PLATELET # BLD AUTO: 251 10*3/MM3 (ref 140–450)
PMV BLD AUTO: 9.3 FL (ref 6–12)
POTASSIUM SERPL-SCNC: 3.7 MMOL/L (ref 3.5–5.2)
RBC # BLD AUTO: 2.42 10*6/MM3 (ref 4.14–5.8)
SODIUM SERPL-SCNC: 140 MMOL/L (ref 136–145)
WBC NRBC COR # BLD AUTO: 10.75 10*3/MM3 (ref 3.4–10.8)

## 2025-02-02 PROCEDURE — 25010000002 ENOXAPARIN PER 10 MG: Performed by: INTERNAL MEDICINE

## 2025-02-02 PROCEDURE — 82948 REAGENT STRIP/BLOOD GLUCOSE: CPT

## 2025-02-02 PROCEDURE — 85025 COMPLETE CBC W/AUTO DIFF WBC: CPT | Performed by: STUDENT IN AN ORGANIZED HEALTH CARE EDUCATION/TRAINING PROGRAM

## 2025-02-02 PROCEDURE — 63710000001 INSULIN LISPRO (HUMAN) PER 5 UNITS: Performed by: INTERNAL MEDICINE

## 2025-02-02 PROCEDURE — 82948 REAGENT STRIP/BLOOD GLUCOSE: CPT | Performed by: INTERNAL MEDICINE

## 2025-02-02 PROCEDURE — 80048 BASIC METABOLIC PNL TOTAL CA: CPT | Performed by: INTERNAL MEDICINE

## 2025-02-02 PROCEDURE — 94664 DEMO&/EVAL PT USE INHALER: CPT

## 2025-02-02 PROCEDURE — P9016 RBC LEUKOCYTES REDUCED: HCPCS

## 2025-02-02 PROCEDURE — 63710000001 REVEFENACIN 175 MCG/3ML SOLUTION: Performed by: INTERNAL MEDICINE

## 2025-02-02 PROCEDURE — 63710000001 INSULIN GLARGINE PER 5 UNITS: Performed by: STUDENT IN AN ORGANIZED HEALTH CARE EDUCATION/TRAINING PROGRAM

## 2025-02-02 PROCEDURE — 25010000002 LINEZOLID 600 MG/300ML SOLUTION: Performed by: STUDENT IN AN ORGANIZED HEALTH CARE EDUCATION/TRAINING PROGRAM

## 2025-02-02 PROCEDURE — 86923 COMPATIBILITY TEST ELECTRIC: CPT

## 2025-02-02 PROCEDURE — 36430 TRANSFUSION BLD/BLD COMPNT: CPT

## 2025-02-02 PROCEDURE — 99233 SBSQ HOSP IP/OBS HIGH 50: CPT | Performed by: INTERNAL MEDICINE

## 2025-02-02 PROCEDURE — 63710000001 INSULIN LISPRO (HUMAN) PER 5 UNITS: Performed by: STUDENT IN AN ORGANIZED HEALTH CARE EDUCATION/TRAINING PROGRAM

## 2025-02-02 PROCEDURE — 94799 UNLISTED PULMONARY SVC/PX: CPT

## 2025-02-02 PROCEDURE — 25010000002 PIPERACILLIN SOD-TAZOBACTAM PER 1 G: Performed by: STUDENT IN AN ORGANIZED HEALTH CARE EDUCATION/TRAINING PROGRAM

## 2025-02-02 PROCEDURE — 86900 BLOOD TYPING SEROLOGIC ABO: CPT

## 2025-02-02 PROCEDURE — 99232 SBSQ HOSP IP/OBS MODERATE 35: CPT | Performed by: STUDENT IN AN ORGANIZED HEALTH CARE EDUCATION/TRAINING PROGRAM

## 2025-02-02 PROCEDURE — 94669 MECHANICAL CHEST WALL OSCILL: CPT

## 2025-02-02 RX ADMIN — INSULIN LISPRO 2 UNITS: 100 INJECTION, SOLUTION INTRAVENOUS; SUBCUTANEOUS at 20:09

## 2025-02-02 RX ADMIN — CARVEDILOL 25 MG: 25 TABLET, FILM COATED ORAL at 20:10

## 2025-02-02 RX ADMIN — INSULIN LISPRO 7 UNITS: 100 INJECTION, SOLUTION INTRAVENOUS; SUBCUTANEOUS at 17:20

## 2025-02-02 RX ADMIN — PIPERACILLIN AND TAZOBACTAM 3.38 G: 3; .375 INJECTION, POWDER, FOR SOLUTION INTRAVENOUS at 13:27

## 2025-02-02 RX ADMIN — LINEZOLID 600 MG: 600 INJECTION, SOLUTION INTRAVENOUS at 20:10

## 2025-02-02 RX ADMIN — CARVEDILOL 25 MG: 25 TABLET, FILM COATED ORAL at 08:37

## 2025-02-02 RX ADMIN — INSULIN LISPRO 4 UNITS: 100 INJECTION, SOLUTION INTRAVENOUS; SUBCUTANEOUS at 12:06

## 2025-02-02 RX ADMIN — ATORVASTATIN CALCIUM 20 MG: 10 TABLET, FILM COATED ORAL at 08:37

## 2025-02-02 RX ADMIN — BUDESONIDE 0.5 MG: 0.5 INHALANT RESPIRATORY (INHALATION) at 09:21

## 2025-02-02 RX ADMIN — LINEZOLID 600 MG: 600 INJECTION, SOLUTION INTRAVENOUS at 08:48

## 2025-02-02 RX ADMIN — ARFORMOTEROL TARTRATE 15 MCG: 15 SOLUTION RESPIRATORY (INHALATION) at 09:21

## 2025-02-02 RX ADMIN — REVEFENACIN 175 MCG: 175 SOLUTION RESPIRATORY (INHALATION) at 09:21

## 2025-02-02 RX ADMIN — BUMETANIDE 2 MG: 1 TABLET ORAL at 20:10

## 2025-02-02 RX ADMIN — FINASTERIDE 5 MG: 5 TABLET, FILM COATED ORAL at 08:38

## 2025-02-02 RX ADMIN — IPRATROPIUM BROMIDE AND ALBUTEROL SULFATE 3 ML: .5; 3 SOLUTION RESPIRATORY (INHALATION) at 22:38

## 2025-02-02 RX ADMIN — Medication 10 ML: at 20:11

## 2025-02-02 RX ADMIN — Medication 10 ML: at 08:40

## 2025-02-02 RX ADMIN — SODIUM HYPOCHLORITE: 1.25 SOLUTION TOPICAL at 21:12

## 2025-02-02 RX ADMIN — INSULIN LISPRO 7 UNITS: 100 INJECTION, SOLUTION INTRAVENOUS; SUBCUTANEOUS at 08:38

## 2025-02-02 RX ADMIN — TAMSULOSIN HYDROCHLORIDE 0.4 MG: 0.4 CAPSULE ORAL at 08:37

## 2025-02-02 RX ADMIN — INSULIN LISPRO 7 UNITS: 100 INJECTION, SOLUTION INTRAVENOUS; SUBCUTANEOUS at 12:06

## 2025-02-02 RX ADMIN — HYDROCODONE BITARTRATE AND ACETAMINOPHEN 1 TABLET: 5; 325 TABLET ORAL at 16:18

## 2025-02-02 RX ADMIN — FERROUS SULFATE TAB 325 MG (65 MG ELEMENTAL FE) 325 MG: 325 (65 FE) TAB at 08:38

## 2025-02-02 RX ADMIN — GUAIFENESIN 600 MG: 600 TABLET ORAL at 20:10

## 2025-02-02 RX ADMIN — TOBRAMYCIN 300 MG: 300 SOLUTION RESPIRATORY (INHALATION) at 20:10

## 2025-02-02 RX ADMIN — BUSPIRONE HYDROCHLORIDE 15 MG: 5 TABLET ORAL at 08:37

## 2025-02-02 RX ADMIN — PIPERACILLIN AND TAZOBACTAM 3.38 G: 3; .375 INJECTION, POWDER, FOR SOLUTION INTRAVENOUS at 20:17

## 2025-02-02 RX ADMIN — ENOXAPARIN SODIUM 40 MG: 100 INJECTION SUBCUTANEOUS at 20:10

## 2025-02-02 RX ADMIN — MONTELUKAST 10 MG: 10 TABLET, FILM COATED ORAL at 20:10

## 2025-02-02 RX ADMIN — ARFORMOTEROL TARTRATE 15 MCG: 15 SOLUTION RESPIRATORY (INHALATION) at 20:09

## 2025-02-02 RX ADMIN — BUDESONIDE 0.5 MG: 0.5 INHALANT RESPIRATORY (INHALATION) at 20:10

## 2025-02-02 RX ADMIN — INSULIN LISPRO 4 UNITS: 100 INJECTION, SOLUTION INTRAVENOUS; SUBCUTANEOUS at 17:18

## 2025-02-02 RX ADMIN — NIFEDIPINE 30 MG: 30 TABLET, FILM COATED, EXTENDED RELEASE ORAL at 06:17

## 2025-02-02 RX ADMIN — ASPIRIN 81 MG: 81 TABLET, COATED ORAL at 06:16

## 2025-02-02 RX ADMIN — GUAIFENESIN 600 MG: 600 TABLET ORAL at 08:37

## 2025-02-02 RX ADMIN — BUMETANIDE 2 MG: 1 TABLET ORAL at 08:37

## 2025-02-02 RX ADMIN — BUSPIRONE HYDROCHLORIDE 15 MG: 5 TABLET ORAL at 20:10

## 2025-02-02 RX ADMIN — TOBRAMYCIN 300 MG: 300 SOLUTION RESPIRATORY (INHALATION) at 09:21

## 2025-02-02 RX ADMIN — FAMOTIDINE 40 MG: 20 TABLET ORAL at 06:17

## 2025-02-02 RX ADMIN — SODIUM HYPOCHLORITE: 1.25 SOLUTION TOPICAL at 09:14

## 2025-02-02 RX ADMIN — INSULIN GLARGINE 15 UNITS: 100 INJECTION, SOLUTION SUBCUTANEOUS at 20:09

## 2025-02-02 RX ADMIN — PIPERACILLIN AND TAZOBACTAM 3.38 G: 3; .375 INJECTION, POWDER, FOR SOLUTION INTRAVENOUS at 05:35

## 2025-02-02 RX ADMIN — Medication 10 ML: at 20:10

## 2025-02-02 NOTE — PROGRESS NOTES
Pulmonary / Critical Care Progress Note      Patient Name: Preston Wallis  : 1965  MRN: [id number]  Attending:  Yolanda Cuevas*  Date of admission: 2025    Subjective   Subjective   Follow-up for acute on chronic hypoxic respiratory failure    Over the last 24 hours: Remains on MOIZ nebs     This morning,  Remains on 3 L  Remains bedridden  Chest tightness improved  Denies any chest pain  Feels like some secretions are coming out  Is a little better today  No fever or chills    Objective   Objective     Vitals:   Temp:  [97.5 °F (36.4 °C)-98.3 °F (36.8 °C)] 97.6 °F (36.4 °C)  Heart Rate:  [84-91] 84  Resp:  [18-22] 18  BP: (128-158)/(45-85) 150/51  Flow (L/min) (Oxygen Therapy):  [3] 3    Physical Exam   Vital Signs Reviewed   General:  WDWN, Alert, NAD.  Chronically ill-appearing male, lying in bed  HEENT:  PERRL, EOMI.  OP, nares clear, no sinus tenderness  Neck:  Supple, no JVD, no thyromegaly  Chest: Improved aeration with bibasilar crackles and rhonchi, diminished right chest, equal rise and fall bilaterally, on 2 L  CV: RRR, no MGR, pulses 2+, equal.  Abd:  Soft, NT, ND, + BS, no HSM, obese  EXT:  no clubbing, no cyanosis, BLE edema  Neuro:  A&Ox3, CN grossly intact, no focal deficits.  Skin: No rashes or lesions noted      Result Review    Result Review:  I have personally reviewed the results from the time of this admission to 2025 06:58 EST and agree with these findings:  [x]  Laboratory  [x]  Microbiology  [x]  Radiology  []  EKG/Telemetry   []  Cardiology/Vascular   []  Pathology  []  Old records  []  Other:  Most notable findings include:         Lab 25  0605 25  0600 25  0609 25  0644 25  0417 25  0303 25  0549   WBC 10.75 14.06* 13.50* 15.99* 16.99*  --  14.02*   HEMOGLOBIN 6.7* 7.5* 6.6* 7.1* 7.2*  --  7.4*   HEMATOCRIT 22.0* 24.0* 21.7* 23.0* 24.2*  --  24.9*   PLATELETS 251 272 299 321 313  --  383   SODIUM 140 139 141 139 137 139  141   POTASSIUM 3.7 3.8 4.1 3.9 4.4 4.8 4.7   CHLORIDE 95* 95* 98 99 99 100 102   CO2 41.5* 38.5* 37.0* 32.0* 30.3* 29.4* 29.4*   BUN 71* 86* 95* 100* 98* 100* 90*   CREATININE 2.13* 2.05* 2.28* 2.41* 2.39* 2.54* 2.22*   GLUCOSE 140* 93 85 114* 263* 388* 389*   CALCIUM 8.7 8.6 8.4* 7.9* 7.9* 7.8* 7.9*   PHOSPHORUS  --   --  3.3  --   --   --   --        Assessment & Plan   Assessment / Plan     Active Hospital Problems:  Active Hospital Problems    Diagnosis     **PNA (pneumonia)     1. Incision and drainage of posterior perianal abscess 2. Sharp excisional debridement through skin and subcutaneous tissue in the posterior perianal area, 9 x 6 x 1 cm     Perianal abscess     Wound of gluteal cleft     Pneumonia due to Pseudomonas species     Bronchiectasis without complication     COPD exacerbation        Impression:  Cystic bronchiectasis with acute exacerbation  History of MDR Pseudomonas  Pseudomonas pneumonia  Acute on chronic hypoxemic and hypercapnic respiratory failure requiring NIPPV  Severe COPD without exacerbation, FEV1 26%  Obesity hypoventilation syndrome  Acute on chronic decompensated congestive diastolic heart failure  Acute cardiogenic pulmonary edema  Recurrent Pseudomonas pneumonia, resistant to Levaquin  Altered mental status  CO2 narcosis  Anasarca  Anemia  CKD  Hypocalcemia  Medical noncompliance of NIV  History of MILADY  Tobacco abuse in remission    PLAN:    -Continue to maintain SpO2 greater than 90%  -S/p bronchoscopy on 1/28 positive for Pseudomonas  -Eliquis remains on hold  -Continue Brovana, Pulmicort, DuoNebs  -Continue MIOZ nebs  -Start bronchopulmonary hygiene.  Encourage I-S and flutter  -Chest physiotherapy available  -Encourage activity as tolerated  -Patient awaiting colostomy sometimes next week  -Patient reporting he feels like his secretions are building up again and unable to clear them.  The service will consider bronchoscopy sometime next week    VTE  Prophylaxis:  Pharmacologic VTE prophylaxis orders are present.    CODE STATUS:   Code Status (Patient has no pulse and is not breathing): CPR (Attempt to Resuscitate)  Medical Interventions (Patient has pulse or is breathing): Full Support    I have reviewed labs, imaging, pertinent clinical data and provider notes.   I have discussed with bedside nurse and primary service.     Electronically signed by CON Sampson, 02/02/25, 10:37 AM EST.    This visit was performed by BOTH a physician and an APC. I personally evaluated and examined the patient. I performed all aspects of MDM as documented. , I have reviewed and confirmed the accuracy of the patient's history as documented in this note., and I have reexamined the patient and the results are consistent with the previously documented exam. I have updated the documentation as necessary.     Electronically signed by Martín Rivera MD, 02/02/25, 3:29 PM EST.     Electronically signed by Martín Rivera MD, 02/02/25, 6:58 AM EST.

## 2025-02-02 NOTE — PLAN OF CARE
Goal Outcome Evaluation:  Plan of Care Reviewed With: patient        Progress: no change  Outcome Evaluation: Patient had 2 large BMs this shift. Dsg changed to coccyx, Left heel, and left arm. IV ABX given. Blood glucose WNL. Will continue plan of care.

## 2025-02-02 NOTE — PLAN OF CARE
Goal Outcome Evaluation:           Progress: no change  Outcome Evaluation: alert and oriented. shortness of breath with any movement. x1 large BM this shift. wound care performed to coccyx. medicated for pain x1 this shift. plan of care ongoing.

## 2025-02-02 NOTE — PROGRESS NOTES
[facility]   Hospitalist Progress Note  Date: 2025  Patient Name: Preston Wallis  : 1965  MRN: [id number]  Date of admission: 2025  Room/Bed: 360/1      Subjective   Subjective     Chief Complaint: SOB    Summary:Preston Wallis is a 59 y.o. male past medical history of COPD, hypertension, CHF, history of MDRO presents to the ED due to shortness of breath. Patient was discharged on  for MDRO pseudomonas pneumonia on IV ertapenem.     Patient admitted for shortness of breath.  Chest x-ray shows chronic bilateral lung infiltrates.  CT was performed to arrival of the chest which demonstrates new spiculated left lower lobe nodule, bronchiectasis throughout both lungs, micronodule or peribronchial densities suggestive of atypical infection, right paratracheal lymph node and enlarging pericardial effusion.  Pulmonology and cardiology services consulted.  TTE demonstrates very small pericardial effusion only, estimated EF 49%, with some grade 1 diastolic dysfunction left ventricle.  Wound care noted necrotic tissue perianal region.  CT abdomen pelvis was performed, 4.5 cm x 1.8 cm x 3.5 cm posterior anal abscess noted.  General surgery consulted, patient underwent incision and drainage .  S/p bronchoscopy 2025, BAL growing Pseudomonas.    Interval Followup: No acute overnight events.  No acute distress.  Patient resting comfortably bed.  No family at bedside today.  He reports that he is doing well, denies any hematemesis, hemoptysis, hematuria, melena.  Discussed needing to give 1 unit transfusion today.  Otherwise he reports that he is doing well.    Objective   Objective     Vitals:   Temp:  [97.3 °F (36.3 °C)-98.1 °F (36.7 °C)] 98.1 °F (36.7 °C)  Heart Rate:  [84-89] 89  Resp:  [18-22] 18  BP: (128-174)/(45-85) 174/54  Flow (L/min) (Oxygen Therapy):  [2-3] 2    Physical Exam   Gen: NAD, Alert and Oriented  Pulm: Nasal cannula in place, no respiratory distress  Abd: soft,  nondistended  Extremities: no pitting edema    Result Review    Result Review:  I have personally reviewed these results:  [x]  Laboratory      Lab 02/02/25  0605 02/01/25  0600 01/31/25  0609   WBC 10.75 14.06* 13.50*   HEMOGLOBIN 6.7* 7.5* 6.6*   HEMATOCRIT 22.0* 24.0* 21.7*   PLATELETS 251 272 299   NEUTROS ABS 7.29* 10.43* 10.00*   IMMATURE GRANS (ABS) 0.10* 0.16* 0.15*   LYMPHS ABS 1.67 1.62 1.65   MONOS ABS 1.12* 1.24* 1.23*   EOS ABS 0.56* 0.60* 0.46*   MCV 90.9 88.2 88.9         Lab 02/02/25  0605 02/01/25  0600 01/31/25  0609   SODIUM 140 139 141   POTASSIUM 3.7 3.8 4.1   CHLORIDE 95* 95* 98   CO2 41.5* 38.5* 37.0*   ANION GAP 3.5* 5.5 6.0   BUN 71* 86* 95*   CREATININE 2.13* 2.05* 2.28*   EGFR 35.0* 36.6* 32.2*   GLUCOSE 140* 93 85   CALCIUM 8.7 8.6 8.4*   MAGNESIUM  --   --  1.8   PHOSPHORUS  --   --  3.3                           Lab 01/31/25  0859 01/31/25  0609   IRON  --  31*   IRON SATURATION (TSAT)  --  17*   TIBC  --  185*   TRANSFERRIN  --  124*   FERRITIN  --  608.60*   ABO TYPING O  --    RH TYPING Negative  --    ANTIBODY SCREEN Negative  --            Brief Urine Lab Results  (Last result in the past 365 days)        Color   Clarity   Blood   Leuk Est   Nitrite   Protein   CREAT   Urine HCG        01/23/25 1912 Yellow   Turbid   Large (3+)   Moderate (2+)   Negative   >=300 mg/dL (3+)                 [x]  Microbiology   Microbiology Results (last 10 days)       Procedure Component Value - Date/Time    Fungus Culture - Lavage, Lung, Right Upper Lobe [[id number]] Collected: 01/28/25 0847    Lab Status: Preliminary result Specimen: Lavage from Lung, Right Upper Lobe Updated: 02/02/25 0916     Fungus Culture No fungus isolated at less than 1 week    AFB Culture - Lavage, Lung, Right Upper Lobe [[id number]] Collected: 01/28/25 0847    Lab Status: Preliminary result Specimen: Lavage from Lung, Right Upper Lobe Updated: 02/02/25 0916     AFB Culture No AFB isolated at less than 1 week     AFB Stain No  acid fast bacilli seen on direct smear      No acid fast bacilli seen on concentrated smear    BAL Culture, Quantitative - Lavage, Lung, Right Upper Lobe [[id number]]  (Abnormal) Collected: 01/28/25 0847    Lab Status: Preliminary result Specimen: Lavage from Lung, Right Upper Lobe Updated: 02/02/25 1227     BAL Culture >100,000 CFU/mL Pseudomonas aeruginosa     Comment: Sending to reference lab for MICs 2.2         No Normal Respiratory Nola     Gram Stain Moderate (3+) WBCs seen      Rare (1+) Gram positive cocci in clusters    Narrative:      02/02/25 NW: other resp culture was sent to [facility] for pseaer mics, if vitek doesn't work then refer  Refer to previous respiratory culture collected on 01/24/2025 1618 for MICs.      Pneumonia Panel - Lavage, Lung, Right Upper Lobe [[id number]]  (Abnormal) Collected: 01/28/25 0847    Lab Status: Final result Specimen: Lavage from Lung, Right Upper Lobe Updated: 01/28/25 1120     Escherichia coli PCR Not Detected     Acinetobacter calcoaceticus-baumannii complex PCR Not Detected     Enterobacter cloacae PCR Not Detected     Klebsiella oxytoca PCR Not Detected     Klebsiella pneumoniae group PCR Not Detected     Klebsiella aerogenes PCR Not Detected     Moraxella catarrhalis PCR Not Detected     Proteus species PCR Not Detected     Pseudomonas aeroginosa PCR Detected     Comment: >=10^7 Bin copies/mL        Serratia marcescens PCR Not Detected     Staphylococcus aureus PCR Not Detected     Streptococcus pyogenes PCR Not Detected     Haemophilus influenzae PCR Not Detected     Streptococcus agalactiae PCR Not Detected     Streptococcus pneumoniae PCR Not Detected     Chlamydophila pneumoniae PCR Not Detected     Legionella pneumophilia PCR Not Detected     Mycoplasma pneumo by PCR Not Detected     ADENOVIRUS, PCR Not Detected     CTX-M Gene Not Detected     IMP Gene Not Detected     KPC Gene Not Detected     mecA/C and MREJ Gene N/A     NDM Gene Not Detected     OXA-48-like Gene  N/A     VIM Gene Not Detected     Coronavirus Not Detected     Human Metapneumovirus Not Detected     Human Rhinovirus/Enterovirus Not Detected     Influenza A PCR Not Detected     Influenza B PCR Not Detected     RSV, PCR Not Detected     Parainfluenza virus PCR Not Detected    Anaerobic Culture - Surgical Site, Perirectal Abscess [[id number]]  (Abnormal) Collected: 01/26/25 1629    Lab Status: Final result Specimen: Surgical Site from Perirectal Abscess Updated: 02/01/25 0802     Anaerobic Culture Mixed Anaerobic Organisms    Fungus Culture - Surgical Site, Perirectal Abscess [[id number]] Collected: 01/26/25 1629    Lab Status: Preliminary result Specimen: Surgical Site from Perirectal Abscess Updated: 01/31/25 1815     Fungus Culture No fungus isolated at less than 1 week    Wound Culture - Surgical Site, Perirectal Abscess [[id number]]  (Abnormal)  (Susceptibility) Collected: 01/26/25 1629    Lab Status: Final result Specimen: Surgical Site from Perirectal Abscess Updated: 01/30/25 0938     Wound Culture Light growth (2+) Enterococcus faecium, VRE     Comment:   Vancomycin Resistant Enterococcus species. Patient may be an isolation risk.         Scant growth (1+) Citrobacter amalonaticus     Gram Stain Few (2+) WBCs seen      Rare (1+) Gram positive cocci in pairs and chains    Susceptibility        Enterococcus faecium, VRE      MARIO      Ampicillin Resistant      Linezolid Susceptible      Vancomycin Resistant                       Susceptibility        Citrobacter amalonaticus      MARIO      Amikacin Susceptible      Amoxicillin + Clavulanate Resistant      Cefazolin (Non Urine) Resistant      Cefepime Resistant      Ceftazidime Resistant      Ceftriaxone Resistant      Ciprofloxacin Resistant      Gentamicin Resistant      Levofloxacin Intermediate      Piperacillin + Tazobactam Susceptible      Tetracycline Resistant      Tobramycin Resistant      Trimethoprim + Sulfamethoxazole Resistant                        Susceptibility Comments       Citrobacter amalonaticus    With the exception of urinary-sourced infections, aminoglycosides should not be used as monotherapy.               AFB Culture - Surgical Site, Perirectal Abscess [[id number]] Collected: 01/26/25 1629    Lab Status: Preliminary result Specimen: Surgical Site from Perirectal Abscess Updated: 01/31/25 1815     AFB Culture No AFB isolated at less than 1 week     AFB Stain No acid fast bacilli seen    Respiratory Culture - Sputum, Cough [[id number]]  (Abnormal) Collected: 01/24/25 1618    Lab Status: Final result Specimen: Sputum from Cough Updated: 02/02/25 1150     Respiratory Culture Moderate growth (3+) Pseudomonas aeruginosa MDRO     Comment: Multi drug resistant Pseudomonas, patient may be an isolation risk.         Scant growth (1+) Normal Respiratory Nola     Gram Stain Few (2+) WBCs seen      Rare (1+) Gram positive cocci      Rare (1+) Gram negative bacilli    Narrative:      Susceptibility performed at [facility]. See scanned report.      AFB Culture - Sputum, Cough [[id number]] Collected: 01/24/25 1618    Lab Status: Preliminary result Specimen: Sputum from Cough Updated: 01/31/25 1631     AFB Culture No AFB isolated at 1 week     AFB Stain No acid fast bacilli seen on direct smear      No acid fast bacilli seen on concentrated smear    S. Pneumo Ag Urine or CSF - Urine, Indwelling Urethral Catheter [[id number]]  (Normal) Collected: 01/23/25 1912    Lab Status: Final result Specimen: Urine from Indwelling Urethral Catheter Updated: 01/24/25 1646     Strep Pneumo Ag Negative    Legionella Antigen, Urine - Urine, Indwelling Urethral Catheter [[id number]]  (Normal) Collected: 01/23/25 1912    Lab Status: Final result Specimen: Urine from Indwelling Urethral Catheter Updated: 01/24/25 1646     LEGIONELLA ANTIGEN, URINE Negative    Blood Culture - Blood, Arm, Right [[id number]]  (Normal) Collected: 01/23/25 1748    Lab Status: Final result Specimen: Blood from Arm,  Right Updated: 01/28/25 1800     Blood Culture No growth at 5 days    Narrative:      Less than seven (7) mL's of blood was collected.  Insufficient quantity may yield false negative results.    Blood Culture - Blood, Hand, Left [[id number]]  (Normal) Collected: 01/23/25 1735    Lab Status: Final result Specimen: Blood from Hand, Left Updated: 01/28/25 1745     Blood Culture No growth at 5 days    Narrative:      Less than seven (7) mL's of blood was collected.  Insufficient quantity may yield false negative results.    COVID-19, FLU A/B, RSV PCR 1 HR TAT - Swab, Nasopharynx [[id number]]  (Normal) Collected: 01/23/25 1723    Lab Status: Final result Specimen: Swab from Nasopharynx Updated: 01/23/25 1806     COVID19 Not Detected     Influenza A PCR Not Detected     Influenza B PCR Not Detected     RSV, PCR Not Detected    Narrative:      Fact sheet for providers: https://www.fda.gov/media/575951/download    Fact sheet for patients: https://www.fda.gov/media/421015/download    Test performed by PCR.          [x]  Radiology  No radiology results for the last 7 days  []  EKG/Telemetry   []  Cardiology/Vascular   []  Pathology  []  Old records  []  Other:    Assessment & Plan   Assessment / Plan     Assessment:  Acute on chronic hypoxic respiratory failure  MDRO Pseudomonas pneumonia  Cystic bronchiectasis with acute exacerbation  Complicated UTI  Type 2 diabetes with hyperglycemia  CKD stage IIIa  COPD, acute exacerbation  Atrial fibrillation on Eliquis  Elevated troponin secondary to demand ischemia  HFpEF, not in acute exacerbation  History of BPH  Chronic left heel ulcer  Obstructive sleep apnea  Perianal abscess s/p I&D 2/2 Enterococcus VRE and MDRO Citrobacter    Plan:  Continue inpatient admission  Pulmonology following.  S/p bronchoscopy 1/28/2025. BAL PCR positive for Pseudomonas.  Culture growing Pseudomonas.  Completed 7 days IV meropenem.  Continue MOIZ nebs   Wound culture grew Enterococcus VRE and MDRO  Citrobacter..  Continue IV linezolid 600 mg twice daily and IV Zosyn.  Hold trazodone and duloxetine.  Continue BuSpar.  Continue Brovana, Pulmicort, Yupelri  Hypoglycemia last night to 51, decrease Lantus to 15 units at bedtime.  Continue medium dose sliding scale.  Continue scheduled 7 units with meals.  Creatinine improving  General Surgery following.  S/p I&D perianal abscess 1/26/2025.  Surgery canceled, planning for sometime next week.    Continue scheduled bowel regiment  WBC normal today  Cardiology following.  Eliquis currently on hold for procedure.  Continue chronic indwelling Doyle catheter  Hemoglobin decreased to 6.7, 1 unit PRBCs ordered.  S/p 1 unit PRBCs.  No active bleeding noted.  Iron panel is low but elevated ferritin  A.m. labs       Discussed with RN.    VTE Prophylaxis:  Pharmacologic VTE prophylaxis orders are present.        CODE STATUS:   Code Status (Patient has no pulse and is not breathing): CPR (Attempt to Resuscitate)  Medical Interventions (Patient has pulse or is breathing): Full Support      Electronically signed by Yolanda Cuevas DO, 2/2/2025, 12:48 EST.

## 2025-02-03 ENCOUNTER — ANESTHESIA (OUTPATIENT)
Dept: PERIOP | Facility: HOSPITAL | Age: 60
End: 2025-02-03
Payer: COMMERCIAL

## 2025-02-03 ENCOUNTER — ANESTHESIA EVENT (OUTPATIENT)
Dept: PERIOP | Facility: HOSPITAL | Age: 60
End: 2025-02-03
Payer: COMMERCIAL

## 2025-02-03 LAB
ANION GAP SERPL CALCULATED.3IONS-SCNC: 6.1 MMOL/L (ref 5–15)
BACTERIA SPEC AEROBE CULT: ABNORMAL
BACTERIA SPEC AEROBE CULT: ABNORMAL
BASOPHILS # BLD AUTO: 0.01 10*3/MM3 (ref 0–0.2)
BASOPHILS NFR BLD AUTO: 0.1 % (ref 0–1.5)
BH BB BLOOD EXPIRATION DATE: NORMAL
BH BB BLOOD TYPE BARCODE: 9500
BH BB DISPENSE STATUS: NORMAL
BH BB PRODUCT CODE: NORMAL
BH BB UNIT NUMBER: NORMAL
BUN SERPL-MCNC: 61 MG/DL (ref 6–20)
BUN/CREAT SERPL: 29.6 (ref 7–25)
CALCIUM SPEC-SCNC: 8.6 MG/DL (ref 8.6–10.5)
CHLORIDE SERPL-SCNC: 95 MMOL/L (ref 98–107)
CO2 SERPL-SCNC: 38.9 MMOL/L (ref 22–29)
CREAT SERPL-MCNC: 2.06 MG/DL (ref 0.76–1.27)
CROSSMATCH INTERPRETATION: NORMAL
DEPRECATED RDW RBC AUTO: 44.3 FL (ref 37–54)
EGFRCR SERPLBLD CKD-EPI 2021: 36.4 ML/MIN/1.73
EOSINOPHIL # BLD AUTO: 0.58 10*3/MM3 (ref 0–0.4)
EOSINOPHIL NFR BLD AUTO: 4.3 % (ref 0.3–6.2)
ERYTHROCYTE [DISTWIDTH] IN BLOOD BY AUTOMATED COUNT: 14.3 % (ref 12.3–15.4)
GLUCOSE BLDC GLUCOMTR-MCNC: 114 MG/DL (ref 70–99)
GLUCOSE BLDC GLUCOMTR-MCNC: 143 MG/DL (ref 70–99)
GLUCOSE BLDC GLUCOMTR-MCNC: 149 MG/DL (ref 70–99)
GLUCOSE BLDC GLUCOMTR-MCNC: 99 MG/DL (ref 70–99)
GLUCOSE SERPL-MCNC: 126 MG/DL (ref 65–99)
GRAM STN SPEC: ABNORMAL
GRAM STN SPEC: ABNORMAL
HCT VFR BLD AUTO: 27.3 % (ref 37.5–51)
HGB BLD-MCNC: 8.6 G/DL (ref 13–17.7)
IMM GRANULOCYTES # BLD AUTO: 0.07 10*3/MM3 (ref 0–0.05)
IMM GRANULOCYTES NFR BLD AUTO: 0.5 % (ref 0–0.5)
LYMPHOCYTES # BLD AUTO: 1.84 10*3/MM3 (ref 0.7–3.1)
LYMPHOCYTES NFR BLD AUTO: 13.5 % (ref 19.6–45.3)
MCH RBC QN AUTO: 27.7 PG (ref 26.6–33)
MCHC RBC AUTO-ENTMCNC: 31.5 G/DL (ref 31.5–35.7)
MCV RBC AUTO: 88.1 FL (ref 79–97)
MONOCYTES # BLD AUTO: 1.49 10*3/MM3 (ref 0.1–0.9)
MONOCYTES NFR BLD AUTO: 11 % (ref 5–12)
NEUTROPHILS NFR BLD AUTO: 70.6 % (ref 42.7–76)
NEUTROPHILS NFR BLD AUTO: 9.61 10*3/MM3 (ref 1.7–7)
NRBC BLD AUTO-RTO: 0 /100 WBC (ref 0–0.2)
PLATELET # BLD AUTO: 251 10*3/MM3 (ref 140–450)
PMV BLD AUTO: 8.8 FL (ref 6–12)
POTASSIUM SERPL-SCNC: 3.9 MMOL/L (ref 3.5–5.2)
RBC # BLD AUTO: 3.1 10*6/MM3 (ref 4.14–5.8)
SODIUM SERPL-SCNC: 140 MMOL/L (ref 136–145)
UNIT  ABO: NORMAL
UNIT  RH: NORMAL
WBC NRBC COR # BLD AUTO: 13.6 10*3/MM3 (ref 3.4–10.8)

## 2025-02-03 PROCEDURE — 97161 PT EVAL LOW COMPLEX 20 MIN: CPT

## 2025-02-03 PROCEDURE — 87186 SC STD MICRODIL/AGAR DIL: CPT | Performed by: INTERNAL MEDICINE

## 2025-02-03 PROCEDURE — 94760 N-INVAS EAR/PLS OXIMETRY 1: CPT

## 2025-02-03 PROCEDURE — 82948 REAGENT STRIP/BLOOD GLUCOSE: CPT | Performed by: INTERNAL MEDICINE

## 2025-02-03 PROCEDURE — 25010000002 LIDOCAINE HCL (PF) 4 % SOLUTION: Performed by: INTERNAL MEDICINE

## 2025-02-03 PROCEDURE — 94799 UNLISTED PULMONARY SVC/PX: CPT

## 2025-02-03 PROCEDURE — 80048 BASIC METABOLIC PNL TOTAL CA: CPT | Performed by: INTERNAL MEDICINE

## 2025-02-03 PROCEDURE — 87496 CYTOMEG DNA AMP PROBE: CPT | Performed by: INTERNAL MEDICINE

## 2025-02-03 PROCEDURE — 94669 MECHANICAL CHEST WALL OSCILL: CPT

## 2025-02-03 PROCEDURE — 85025 COMPLETE CBC W/AUTO DIFF WBC: CPT | Performed by: STUDENT IN AN ORGANIZED HEALTH CARE EDUCATION/TRAINING PROGRAM

## 2025-02-03 PROCEDURE — 25810000003 LACTATED RINGERS PER 1000 ML: Performed by: ANESTHESIOLOGY

## 2025-02-03 PROCEDURE — 99232 SBSQ HOSP IP/OBS MODERATE 35: CPT | Performed by: INTERNAL MEDICINE

## 2025-02-03 PROCEDURE — 25010000002 ENOXAPARIN PER 10 MG: Performed by: INTERNAL MEDICINE

## 2025-02-03 PROCEDURE — 88108 CYTOPATH CONCENTRATE TECH: CPT | Performed by: INTERNAL MEDICINE

## 2025-02-03 PROCEDURE — 63710000001 INSULIN GLARGINE PER 5 UNITS: Performed by: INTERNAL MEDICINE

## 2025-02-03 PROCEDURE — 87252 VIRUS INOCULATION TISSUE: CPT | Performed by: INTERNAL MEDICINE

## 2025-02-03 PROCEDURE — 87206 SMEAR FLUORESCENT/ACID STAI: CPT | Performed by: INTERNAL MEDICINE

## 2025-02-03 PROCEDURE — 25010000002 PROPOFOL 10 MG/ML EMULSION

## 2025-02-03 PROCEDURE — 87205 SMEAR GRAM STAIN: CPT | Performed by: INTERNAL MEDICINE

## 2025-02-03 PROCEDURE — 87116 MYCOBACTERIA CULTURE: CPT | Performed by: INTERNAL MEDICINE

## 2025-02-03 PROCEDURE — 0BJ08ZZ INSPECTION OF TRACHEOBRONCHIAL TREE, VIA NATURAL OR ARTIFICIAL OPENING ENDOSCOPIC: ICD-10-PCS | Performed by: INTERNAL MEDICINE

## 2025-02-03 PROCEDURE — 87070 CULTURE OTHR SPECIMN AEROBIC: CPT | Performed by: INTERNAL MEDICINE

## 2025-02-03 PROCEDURE — 63710000001 REVEFENACIN 175 MCG/3ML SOLUTION: Performed by: INTERNAL MEDICINE

## 2025-02-03 PROCEDURE — 82948 REAGENT STRIP/BLOOD GLUCOSE: CPT

## 2025-02-03 PROCEDURE — 25010000002 LIDOCAINE PF 2% 2 % SOLUTION

## 2025-02-03 PROCEDURE — 87077 CULTURE AEROBIC IDENTIFY: CPT | Performed by: INTERNAL MEDICINE

## 2025-02-03 PROCEDURE — 99232 SBSQ HOSP IP/OBS MODERATE 35: CPT | Performed by: STUDENT IN AN ORGANIZED HEALTH CARE EDUCATION/TRAINING PROGRAM

## 2025-02-03 PROCEDURE — 25010000002 PIPERACILLIN SOD-TAZOBACTAM PER 1 G: Performed by: STUDENT IN AN ORGANIZED HEALTH CARE EDUCATION/TRAINING PROGRAM

## 2025-02-03 PROCEDURE — 88312 SPECIAL STAINS GROUP 1: CPT | Performed by: INTERNAL MEDICINE

## 2025-02-03 PROCEDURE — 0BC68ZZ EXTIRPATION OF MATTER FROM RIGHT LOWER LOBE BRONCHUS, VIA NATURAL OR ARTIFICIAL OPENING ENDOSCOPIC: ICD-10-PCS | Performed by: INTERNAL MEDICINE

## 2025-02-03 PROCEDURE — 94664 DEMO&/EVAL PT USE INHALER: CPT

## 2025-02-03 PROCEDURE — 87281 PNEUMOCYSTIS CARINII AG IF: CPT | Performed by: INTERNAL MEDICINE

## 2025-02-03 PROCEDURE — 31645 BRNCHSC W/THER ASPIR 1ST: CPT | Performed by: INTERNAL MEDICINE

## 2025-02-03 PROCEDURE — 25010000002 LINEZOLID 600 MG/300ML SOLUTION: Performed by: STUDENT IN AN ORGANIZED HEALTH CARE EDUCATION/TRAINING PROGRAM

## 2025-02-03 PROCEDURE — 87102 FUNGUS ISOLATION CULTURE: CPT | Performed by: INTERNAL MEDICINE

## 2025-02-03 RX ORDER — PROPOFOL 10 MG/ML
VIAL (ML) INTRAVENOUS AS NEEDED
Status: DISCONTINUED | OUTPATIENT
Start: 2025-02-03 | End: 2025-02-03 | Stop reason: SURG

## 2025-02-03 RX ORDER — LIDOCAINE HYDROCHLORIDE 40 MG/ML
INJECTION, SOLUTION RETROBULBAR AS NEEDED
Status: DISCONTINUED | OUTPATIENT
Start: 2025-02-03 | End: 2025-02-03 | Stop reason: HOSPADM

## 2025-02-03 RX ORDER — SODIUM CHLORIDE, SODIUM LACTATE, POTASSIUM CHLORIDE, CALCIUM CHLORIDE 600; 310; 30; 20 MG/100ML; MG/100ML; MG/100ML; MG/100ML
9 INJECTION, SOLUTION INTRAVENOUS CONTINUOUS PRN
Status: DISCONTINUED | OUTPATIENT
Start: 2025-02-03 | End: 2025-02-03 | Stop reason: HOSPADM

## 2025-02-03 RX ORDER — PHENYLEPHRINE HCL IN 0.9% NACL 1 MG/10 ML
SYRINGE (ML) INTRAVENOUS AS NEEDED
Status: DISCONTINUED | OUTPATIENT
Start: 2025-02-03 | End: 2025-02-03 | Stop reason: SURG

## 2025-02-03 RX ORDER — LIDOCAINE HYDROCHLORIDE 20 MG/ML
INJECTION, SOLUTION EPIDURAL; INFILTRATION; INTRACAUDAL; PERINEURAL AS NEEDED
Status: DISCONTINUED | OUTPATIENT
Start: 2025-02-03 | End: 2025-02-03 | Stop reason: SURG

## 2025-02-03 RX ADMIN — ARFORMOTEROL TARTRATE 15 MCG: 15 SOLUTION RESPIRATORY (INHALATION) at 22:08

## 2025-02-03 RX ADMIN — CARVEDILOL 25 MG: 25 TABLET, FILM COATED ORAL at 21:04

## 2025-02-03 RX ADMIN — PIPERACILLIN AND TAZOBACTAM 3.38 G: 3; .375 INJECTION, POWDER, FOR SOLUTION INTRAVENOUS at 04:47

## 2025-02-03 RX ADMIN — BUMETANIDE 2 MG: 1 TABLET ORAL at 21:04

## 2025-02-03 RX ADMIN — BUDESONIDE 0.5 MG: 0.5 INHALANT RESPIRATORY (INHALATION) at 22:08

## 2025-02-03 RX ADMIN — PROPOFOL 100 MCG/KG/MIN: 10 INJECTION, EMULSION INTRAVENOUS at 17:13

## 2025-02-03 RX ADMIN — SODIUM HYPOCHLORITE: 1.25 SOLUTION TOPICAL at 21:04

## 2025-02-03 RX ADMIN — BUMETANIDE 2 MG: 1 TABLET ORAL at 12:22

## 2025-02-03 RX ADMIN — FERROUS SULFATE TAB 325 MG (65 MG ELEMENTAL FE) 325 MG: 325 (65 FE) TAB at 12:31

## 2025-02-03 RX ADMIN — ATORVASTATIN CALCIUM 20 MG: 10 TABLET, FILM COATED ORAL at 12:23

## 2025-02-03 RX ADMIN — BUSPIRONE HYDROCHLORIDE 15 MG: 5 TABLET ORAL at 12:23

## 2025-02-03 RX ADMIN — TOBRAMYCIN 300 MG: 300 SOLUTION RESPIRATORY (INHALATION) at 22:08

## 2025-02-03 RX ADMIN — PIPERACILLIN AND TAZOBACTAM 3.38 G: 3; .375 INJECTION, POWDER, FOR SOLUTION INTRAVENOUS at 13:32

## 2025-02-03 RX ADMIN — ASPIRIN 81 MG: 81 TABLET, COATED ORAL at 06:07

## 2025-02-03 RX ADMIN — SODIUM HYPOCHLORITE: 1.25 SOLUTION TOPICAL at 09:55

## 2025-02-03 RX ADMIN — INSULIN GLARGINE 15 UNITS: 100 INJECTION, SOLUTION SUBCUTANEOUS at 21:04

## 2025-02-03 RX ADMIN — FAMOTIDINE 40 MG: 20 TABLET ORAL at 06:07

## 2025-02-03 RX ADMIN — IPRATROPIUM BROMIDE AND ALBUTEROL SULFATE 3 ML: .5; 3 SOLUTION RESPIRATORY (INHALATION) at 02:50

## 2025-02-03 RX ADMIN — BUDESONIDE 0.5 MG: 0.5 INHALANT RESPIRATORY (INHALATION) at 09:40

## 2025-02-03 RX ADMIN — TAMSULOSIN HYDROCHLORIDE 0.4 MG: 0.4 CAPSULE ORAL at 12:31

## 2025-02-03 RX ADMIN — SODIUM CHLORIDE, POTASSIUM CHLORIDE, SODIUM LACTATE AND CALCIUM CHLORIDE: 600; 310; 30; 20 INJECTION, SOLUTION INTRAVENOUS at 17:02

## 2025-02-03 RX ADMIN — FINASTERIDE 5 MG: 5 TABLET, FILM COATED ORAL at 12:23

## 2025-02-03 RX ADMIN — GUAIFENESIN 600 MG: 600 TABLET ORAL at 21:04

## 2025-02-03 RX ADMIN — ARFORMOTEROL TARTRATE 15 MCG: 15 SOLUTION RESPIRATORY (INHALATION) at 09:40

## 2025-02-03 RX ADMIN — LINEZOLID 600 MG: 600 INJECTION, SOLUTION INTRAVENOUS at 21:04

## 2025-02-03 RX ADMIN — PROPOFOL 50 MG: 10 INJECTION, EMULSION INTRAVENOUS at 17:12

## 2025-02-03 RX ADMIN — Medication 10 ML: at 09:55

## 2025-02-03 RX ADMIN — NIFEDIPINE 30 MG: 30 TABLET, FILM COATED, EXTENDED RELEASE ORAL at 06:07

## 2025-02-03 RX ADMIN — ENOXAPARIN SODIUM 40 MG: 100 INJECTION SUBCUTANEOUS at 21:04

## 2025-02-03 RX ADMIN — Medication 100 MCG: at 17:29

## 2025-02-03 RX ADMIN — BUSPIRONE HYDROCHLORIDE 15 MG: 5 TABLET ORAL at 21:04

## 2025-02-03 RX ADMIN — Medication 10 ML: at 21:04

## 2025-02-03 RX ADMIN — MONTELUKAST 10 MG: 10 TABLET, FILM COATED ORAL at 21:04

## 2025-02-03 RX ADMIN — REVEFENACIN 175 MCG: 175 SOLUTION RESPIRATORY (INHALATION) at 09:40

## 2025-02-03 RX ADMIN — LIDOCAINE HYDROCHLORIDE 100 MG: 20 INJECTION, SOLUTION EPIDURAL; INFILTRATION; INTRACAUDAL; PERINEURAL at 17:12

## 2025-02-03 RX ADMIN — GUAIFENESIN 600 MG: 600 TABLET ORAL at 12:31

## 2025-02-03 RX ADMIN — CARVEDILOL 25 MG: 25 TABLET, FILM COATED ORAL at 12:23

## 2025-02-03 RX ADMIN — LINEZOLID 600 MG: 600 INJECTION, SOLUTION INTRAVENOUS at 09:54

## 2025-02-03 RX ADMIN — PIPERACILLIN AND TAZOBACTAM 3.38 G: 3; .375 INJECTION, POWDER, FOR SOLUTION INTRAVENOUS at 21:04

## 2025-02-03 RX ADMIN — TOBRAMYCIN 300 MG: 300 SOLUTION RESPIRATORY (INHALATION) at 09:40

## 2025-02-03 NOTE — PROGRESS NOTES
[facility]   Hospitalist Progress Note  Date: 2/3/2025  Patient Name: Preston Wallis  : 1965  MRN: [id number]  Date of admission: 2025  Room/Bed: 360/1      Subjective   Subjective     Chief Complaint: SOB    Summary:Preston Wallis is a 59 y.o. male past medical history of COPD, hypertension, CHF, history of MDRO presents to the ED due to shortness of breath. Patient was discharged on  for MDRO pseudomonas pneumonia on IV ertapenem.     Patient admitted for shortness of breath.  Chest x-ray shows chronic bilateral lung infiltrates.  CT was performed to arrival of the chest which demonstrates new spiculated left lower lobe nodule, bronchiectasis throughout both lungs, micronodule or peribronchial densities suggestive of atypical infection, right paratracheal lymph node and enlarging pericardial effusion.  Pulmonology and cardiology services consulted.  TTE demonstrates very small pericardial effusion only, estimated EF 49%, with some grade 1 diastolic dysfunction left ventricle.  Wound care noted necrotic tissue perianal region.  CT abdomen pelvis was performed, 4.5 cm x 1.8 cm x 3.5 cm posterior anal abscess noted.  General surgery consulted, patient underwent incision and drainage .  S/p bronchoscopy 2025, BAL growing Pseudomonas.    Interval Followup: No acute overnight events.  No acute distress.  Patient resting comfortably in bed this morning.  He reports pulmonology is going to take him for bronchoscopy this afternoon.  His hemoglobin is improved.  He denies any chest pain, abdominal pain or new issues overnight.    Objective   Objective     Vitals:   Temp:  [97.8 °F (36.6 °C)-98.6 °F (37 °C)] 98.1 °F (36.7 °C)  Heart Rate:  [83-95] 84  Resp:  [18-20] 20  BP: (151-174)/(51-69) 156/66  Flow (L/min) (Oxygen Therapy):  [2-5] 3    Physical Exam   Gen: NAD, Alert and Oriented  Pulm: Nasal cannula in place, no respiratory distress  Abd: soft, nondistended  Extremities: no pitting  edema    Result Review    Result Review:  I have personally reviewed these results:  [x]  Laboratory      Lab 02/03/25 0544 02/02/25  0605 02/01/25  0600   WBC 13.60* 10.75 14.06*   HEMOGLOBIN 8.6* 6.7* 7.5*   HEMATOCRIT 27.3* 22.0* 24.0*   PLATELETS 251 251 272   NEUTROS ABS 9.61* 7.29* 10.43*   IMMATURE GRANS (ABS) 0.07* 0.10* 0.16*   LYMPHS ABS 1.84 1.67 1.62   MONOS ABS 1.49* 1.12* 1.24*   EOS ABS 0.58* 0.56* 0.60*   MCV 88.1 90.9 88.2         Lab 02/03/25 0544 02/02/25  0605 02/01/25  0600 01/31/25  0609   SODIUM 140 140 139 141   POTASSIUM 3.9 3.7 3.8 4.1   CHLORIDE 95* 95* 95* 98   CO2 38.9* 41.5* 38.5* 37.0*   ANION GAP 6.1 3.5* 5.5 6.0   BUN 61* 71* 86* 95*   CREATININE 2.06* 2.13* 2.05* 2.28*   EGFR 36.4* 35.0* 36.6* 32.2*   GLUCOSE 126* 140* 93 85   CALCIUM 8.6 8.7 8.6 8.4*   MAGNESIUM  --   --   --  1.8   PHOSPHORUS  --   --   --  3.3                           Lab 01/31/25  0859 01/31/25  0609   IRON  --  31*   IRON SATURATION (TSAT)  --  17*   TIBC  --  185*   TRANSFERRIN  --  124*   FERRITIN  --  608.60*   ABO TYPING O  --    RH TYPING Negative  --    ANTIBODY SCREEN Negative  --            Brief Urine Lab Results  (Last result in the past 365 days)        Color   Clarity   Blood   Leuk Est   Nitrite   Protein   CREAT   Urine HCG        01/23/25 1912 Yellow   Turbid   Large (3+)   Moderate (2+)   Negative   >=300 mg/dL (3+)                 [x]  Microbiology   Microbiology Results (last 10 days)       Procedure Component Value - Date/Time    Fungus Culture - Lavage, Lung, Right Upper Lobe [[id number]] Collected: 01/28/25 0847    Lab Status: Preliminary result Specimen: Lavage from Lung, Right Upper Lobe Updated: 02/02/25 0916     Fungus Culture No fungus isolated at less than 1 week    AFB Culture - Lavage, Lung, Right Upper Lobe [[id number]] Collected: 01/28/25 0847    Lab Status: Preliminary result Specimen: Lavage from Lung, Right Upper Lobe Updated: 02/02/25 0916     AFB Culture No AFB isolated at  less than 1 week     AFB Stain No acid fast bacilli seen on direct smear      No acid fast bacilli seen on concentrated smear    BAL Culture, Quantitative - Lavage, Lung, Right Upper Lobe [[id number]]  (Abnormal) Collected: 01/28/25 0847    Lab Status: Final result Specimen: Lavage from Lung, Right Upper Lobe Updated: 02/03/25 0849     BAL Culture >100,000 CFU/mL Pseudomonas aeruginosa      No Normal Respiratory Nola     Gram Stain Moderate (3+) WBCs seen      Rare (1+) Gram positive cocci in clusters    Narrative:        Refer to previous respiratory culture collected on 01/24/2025 1618 for MICs.      Pneumonia Panel - Lavage, Lung, Right Upper Lobe [[id number]]  (Abnormal) Collected: 01/28/25 0847    Lab Status: Final result Specimen: Lavage from Lung, Right Upper Lobe Updated: 01/28/25 1120     Escherichia coli PCR Not Detected     Acinetobacter calcoaceticus-baumannii complex PCR Not Detected     Enterobacter cloacae PCR Not Detected     Klebsiella oxytoca PCR Not Detected     Klebsiella pneumoniae group PCR Not Detected     Klebsiella aerogenes PCR Not Detected     Moraxella catarrhalis PCR Not Detected     Proteus species PCR Not Detected     Pseudomonas aeroginosa PCR Detected     Comment: >=10^7 Bin copies/mL        Serratia marcescens PCR Not Detected     Staphylococcus aureus PCR Not Detected     Streptococcus pyogenes PCR Not Detected     Haemophilus influenzae PCR Not Detected     Streptococcus agalactiae PCR Not Detected     Streptococcus pneumoniae PCR Not Detected     Chlamydophila pneumoniae PCR Not Detected     Legionella pneumophilia PCR Not Detected     Mycoplasma pneumo by PCR Not Detected     ADENOVIRUS, PCR Not Detected     CTX-M Gene Not Detected     IMP Gene Not Detected     KPC Gene Not Detected     mecA/C and MREJ Gene N/A     NDM Gene Not Detected     OXA-48-like Gene N/A     VIM Gene Not Detected     Coronavirus Not Detected     Human Metapneumovirus Not Detected     Human  Rhinovirus/Enterovirus Not Detected     Influenza A PCR Not Detected     Influenza B PCR Not Detected     RSV, PCR Not Detected     Parainfluenza virus PCR Not Detected    Anaerobic Culture - Surgical Site, Perirectal Abscess [[id number]]  (Abnormal) Collected: 01/26/25 1629    Lab Status: Final result Specimen: Surgical Site from Perirectal Abscess Updated: 02/01/25 0802     Anaerobic Culture Mixed Anaerobic Organisms    Fungus Culture - Surgical Site, Perirectal Abscess [[id number]] Collected: 01/26/25 1629    Lab Status: Preliminary result Specimen: Surgical Site from Perirectal Abscess Updated: 02/02/25 1816     Fungus Culture No fungus isolated at 1 week    Wound Culture - Surgical Site, Perirectal Abscess [[id number]]  (Abnormal)  (Susceptibility) Collected: 01/26/25 1629    Lab Status: Final result Specimen: Surgical Site from Perirectal Abscess Updated: 01/30/25 0938     Wound Culture Light growth (2+) Enterococcus faecium, VRE     Comment:   Vancomycin Resistant Enterococcus species. Patient may be an isolation risk.         Scant growth (1+) Citrobacter amalonaticus     Gram Stain Few (2+) WBCs seen      Rare (1+) Gram positive cocci in pairs and chains    Susceptibility        Enterococcus faecium, VRE      MARIO      Ampicillin Resistant      Linezolid Susceptible      Vancomycin Resistant                       Susceptibility        Citrobacter amalonaticus      MARIO      Amikacin Susceptible      Amoxicillin + Clavulanate Resistant      Cefazolin (Non Urine) Resistant      Cefepime Resistant      Ceftazidime Resistant      Ceftriaxone Resistant      Ciprofloxacin Resistant      Gentamicin Resistant      Levofloxacin Intermediate      Piperacillin + Tazobactam Susceptible      Tetracycline Resistant      Tobramycin Resistant      Trimethoprim + Sulfamethoxazole Resistant                       Susceptibility Comments       Citrobacter amalonaticus    With the exception of urinary-sourced infections,  aminoglycosides should not be used as monotherapy.               AFB Culture - Surgical Site, Perirectal Abscess [[id number]] Collected: 01/26/25 1629    Lab Status: Preliminary result Specimen: Surgical Site from Perirectal Abscess Updated: 02/02/25 1816     AFB Culture No AFB isolated at 1 week     AFB Stain No acid fast bacilli seen    Respiratory Culture - Sputum, Cough [[id number]]  (Abnormal) Collected: 01/24/25 1618    Lab Status: Final result Specimen: Sputum from Cough Updated: 02/02/25 1150     Respiratory Culture Moderate growth (3+) Pseudomonas aeruginosa MDRO     Comment: Multi drug resistant Pseudomonas, patient may be an isolation risk.         Scant growth (1+) Normal Respiratory Nola     Gram Stain Few (2+) WBCs seen      Rare (1+) Gram positive cocci      Rare (1+) Gram negative bacilli    Narrative:      Susceptibility performed at [facility]. See scanned report.      AFB Culture - Sputum, Cough [[id number]] Collected: 01/24/25 1618    Lab Status: Preliminary result Specimen: Sputum from Cough Updated: 01/31/25 1631     AFB Culture No AFB isolated at 1 week     AFB Stain No acid fast bacilli seen on direct smear      No acid fast bacilli seen on concentrated smear          [x]  Radiology  No radiology results for the last 7 days  []  EKG/Telemetry   []  Cardiology/Vascular   []  Pathology  []  Old records  []  Other:    Assessment & Plan   Assessment / Plan     Assessment:  Acute on chronic hypoxic respiratory failure  MDRO Pseudomonas pneumonia  Cystic bronchiectasis with acute exacerbation  Complicated UTI  Type 2 diabetes with hyperglycemia  CKD stage IIIa  COPD, acute exacerbation  Atrial fibrillation on Eliquis  Elevated troponin secondary to demand ischemia  HFpEF, not in acute exacerbation  History of BPH  Chronic left heel ulcer  Obstructive sleep apnea  Perianal abscess s/p I&D 2/2 Enterococcus VRE and MDRO Citrobacter    Plan:  Continue inpatient admission  Pulmonology following.  S/p  bronchoscopy 1/28/2025. BAL PCR positive for Pseudomonas.  Culture growing Pseudomonas.  Plan bronchoscopy again this afternoon  Completed 7 days IV meropenem.  Continue MOIZ nebs   Wound culture grew Enterococcus VRE and MDRO Citrobacter.  Continue IV linezolid 600 mg twice daily and IV Zosyn.  Hold trazodone and duloxetine.  Continue BuSpar.  Continue Brovana, Pulmicort, Yupelri  Glucose improved, no lows overnight.  Continue Lantus to 15 units at bedtime.  Continue medium dose sliding scale.  Continue scheduled 7 units with meals.  Creatinine improving  General Surgery following.  S/p I&D perianal abscess 1/26/2025.  Surgery rescheduled for this week.    Continue scheduled bowel regiment  Cardiology following.  Eliquis currently on hold for procedure.  Continue chronic indwelling Doyle catheter  Hemoglobin improved to 8.6, s/p 2 units PRBCs ordered.  S/p 1 unit PRBCs.  No active bleeding noted.  Iron panel is low but elevated ferritin  A.m. labs       Discussed with RN.    VTE Prophylaxis:  Pharmacologic VTE prophylaxis orders are present.        CODE STATUS:   Code Status (Patient has no pulse and is not breathing): CPR (Attempt to Resuscitate)  Medical Interventions (Patient has pulse or is breathing): Full Support      Electronically signed by Yolanda Cuevas DO, 2/3/2025, 11:41 EST.

## 2025-02-03 NOTE — PLAN OF CARE
Goal Outcome Evaluation:  Plan of Care Reviewed With: patient        Progress: no change  Outcome Evaluation: Patient currently not appropriate for continued physical therapy services as he is nonambulatory at baseline and has not been transferring to his wheelchair unless absolutely necessary.  Patient is dependent with all ADLs as well.  He will be discharged from physical therapy caseload.    Anticipated Discharge Disposition (PT): home with 24/7 care, home with assist, extended care facility (if family unable to care for patient, recommend LTC)

## 2025-02-03 NOTE — ANESTHESIA PREPROCEDURE EVALUATION
Anesthesia Evaluation     Patient summary reviewed and Nursing notes reviewed   no history of anesthetic complications:   NPO Solid Status: > 8 hours  NPO Liquid Status: > 2 hours           Airway   Mallampati: I  TM distance: >3 FB  Neck ROM: full  No difficulty expected  Dental    (+) edentulous    Pulmonary     breath sounds clear to auscultation  (+) pneumonia , COPD,home oxygen, shortness of breath, sleep apnea, decreased breath sounds  Cardiovascular - normal exam  Exercise tolerance: good (4-7 METS)    ECG reviewed  Patient on routine beta blocker and Beta blocker given within 24 hours of surgery  Rhythm: regular  Rate: normal    (+) hypertension, dysrhythmias Paroxysmal Atrial Fib, CHF , PVD, DVT, hyperlipidemia,  carotid artery disease      Neuro/Psych- negative ROS  GI/Hepatic/Renal/Endo    (+) obesity, GERD, renal disease- CRI, diabetes mellitus type 2 poorly controlled    Musculoskeletal (-) negative ROS    Abdominal    Substance History - negative use     OB/GYN negative ob/gyn ROS         Other        ROS/Med Hx Other: PAT Nursing Notes unavailable.     Patient with persistent drug resistant pneumonia               Interpretation Summary         Left ventricular ejection fraction is mildly reduced (Calculated EF = 40%).    Low probability of ischemia during this study, patient may had an apical infarct versus apical thinning..    Findings consistent with an equivocal ECG stress test.     BNORMAL ECG -  Sinus rhythm  Nonspecific  intraventricular conduction delay  Abnormal T, consider ischemia, lateral leads  When compared with ECG of 14-Apr-2024 19:09:06,  Significant rate increase  Significant repolarization change       Anesthesia Plan    ASA 4     general     (Patient understands anesthesia not responsible for dental damage.)  intravenous induction     Anesthetic plan, risks, benefits, and alternatives have been provided, discussed and informed consent has been obtained with: patient.    Use of  blood products discussed with patient .    Plan discussed with CRNA.        CODE STATUS:    Code Status (Patient has no pulse and is not breathing): CPR (Attempt to Resuscitate)  Medical Interventions (Patient has pulse or is breathing): Full Support

## 2025-02-03 NOTE — PLAN OF CARE
Goal Outcome Evaluation:  Plan of Care Reviewed With: patient        Progress: no change  Outcome Evaluation: Patient had mult. loose BMs this shift. Dsgs changed per wound care order. Norco given x1 for left sided pain. Patient is to be NPO at midnight for a poss. Bronch. 1 unit of PRBC given. Will continue plan of care.

## 2025-02-03 NOTE — OP NOTE
Bronchoscopy Procedure Note    Patient Identification:  Preston Wallis  59 y.o.  male  1965  [id number]        Procedure:  Bronchoscopy, Therapeutic    Pre-Operative Diagnosis: Mucous plugging    Post-Operative Diagnosis: Same    Indication: COPD exacerbation with bronchiectasis mucous plugging    Anesthesia: Monitored Anesthesia Care (MAC)    Procedure Details: Patient was consented for the procedure with all risk and benefit of the procedure explained in detail.  Patient was given the opportunity to ask questions and all concerns were answered.  The bronchocope was inserted into the main airway via the oropharynx. An anatomical survey was done of the main airways and the subsegmental bronchus to at least the first subsegmental level of all five lobes of both lungs.  The findings are reported below.    Findings:  Bronchoscope passed through LMA to the level of the vocal cords.  Lidocaine used for local anesthetic over vocal cords.  Bronchoscope was passed between the vocal cords into the trachea.  All airways were visualized to at least the first subsegment level of all 5 lobes of both lungs.  Congestion and edema office bronchial mucosa especially right and left lower lobe bronchi extremely friable with thick purulent secretions and mucous plugs noted in both right and left lower lobes mainly in the right washings were taken and mucous plugs were aspirated no growth or foreign body was noted       Estimated Blood Loss:  Minimal           Specimens:  Sent purulent fluid                Complications:  None; patient tolerated the procedure well.           Disposition: PACU - hemodynamically stable.      Patient tolerated the procedure well.        This document has been electronically signed by Chadd Swan MD on February 3, 2025 17:25 EST

## 2025-02-03 NOTE — INTERVAL H&P NOTE
H&P reviewed. The patient was examined and there are no changes to the H&P.      The risks and benefits of the procedure were discussed with the patient benefits being improvement in his breathing and clearance of his airways and the risks being hypoxia and arrhythmias

## 2025-02-03 NOTE — PROGRESS NOTES
Pulmonary / Critical Care Progress Note      Patient Name: Preston Wallis  : 1965  MRN: [id number]  Primary Care Physician:  Kimmy Riley MD  Date of admission: 2025    Subjective   Subjective   Follow-up for acute on chronic respiratory failure hypoxic    Over past 24 hours:  Patient continues to have shortness of breath with cough and feels he is feeling back in his lungs with difficulty expectoration and clearing his airway patient is presently on MOIZ nebs       Review of Systems  Complains of shortness of breath with cough and difficulty expectorating  Chest discomfort  Remains on nasal cannula  No fever or hemoptysis      Objective   Objective     Vitals:   Temp:  [97.8 °F (36.6 °C)-98.6 °F (37 °C)] 98.1 °F (36.7 °C)  Heart Rate:  [83-95] 84  Resp:  [18-20] 20  BP: (151-174)/(51-69) 156/66  Flow (L/min) (Oxygen Therapy):  [2-5] 3  Physical Exam   Vital Signs Reviewed   General: WDWN, Alert,   Appears chronically ill  HEENT:  PERRL, EOMI.  OP, nares clear, no sinus tenderness  Neck:  Supple, no JVD, no thyromegaly  Chest: Scattered bilateral crackles with rhonchi mainly at the lung bases   CV: RRR, no MGR, pulses 2+, equal.  Abd:  Soft, NT, ND, + BS, no HSM  EXT:  no clubbing, no cyanosis, no edema  Neuro:  A&Ox3, CN grossly intact, no focal deficits.  Skin: No rashes or lesions noted      Result Review    Result Review:  I have personally reviewed the results from the time of this admission to 2/3/2025 11:12 EST and agree with these findings:  [x]  Laboratory  [x]  Microbiology  [x]  Radiology  []  EKG/Telemetry   []  Cardiology/Vascular   []  Pathology  []  Old records  []  Other:  Most notable findings include: Bilateral patchy infiltrates    Assessment & Plan   Assessment / Plan     Active Hospital Problems:  Active Hospital Problems    Diagnosis     **PNA (pneumonia)     1. Incision and drainage of posterior perianal abscess 2. Sharp excisional debridement through skin and subcutaneous  tissue in the posterior perianal area, 9 x 6 x 1 cm     Perianal abscess     Wound of gluteal cleft     Bacterial pneumonia     Bronchiectasis with acute exacerbation     Pneumonia due to Pseudomonas species     Bronchiectasis without complication     COPD exacerbation          Impression:  Acute on chronic-hypoxic and hypercapnic respiratory failure  COPD exacerbation severe  Bronchiectasis  Plan:  Continue oxygen along with neb treatments  Continue tobramycin nebs  Continue chest physiotherapy and bronchopulmonary hygiene  Incentive spirometry and flutter  Will repeat chest x-ray  Schedule for bronchoscopy for airway clearance tomorrow    VTE Prophylaxis:  Pharmacologic VTE prophylaxis orders are present.        CODE STATUS:   Code Status (Patient has no pulse and is not breathing): CPR (Attempt to Resuscitate)  Medical Interventions (Patient has pulse or is breathing): Full Support      Electronically signed by Chadd Swan MD, 02/03/25, 11:12 AM EST.    Electronically signed by Chadd Swan MD, 02/03/25, 11:16 AM EST.

## 2025-02-03 NOTE — PLAN OF CARE
Goal Outcome Evaluation:      Antibiotics administered per orders. Patient remains on 2L NC. Patient becomes short of air with minimal exertion. Wound care provided per orders to coccyx and left heel. Reminded to self-turn. Accuchecks within normal parameters. Patient went for bronchoscopy this afternoon.                                       For information on Fall & Injury Prevention, visit: [email or web address] OR  [email or web address] OR  https://www.cdc.gov/steadi/patient.html

## 2025-02-03 NOTE — H&P (VIEW-ONLY)
Pulmonary / Critical Care Progress Note      Patient Name: Preston Wallis  : 1965  MRN: [id number]  Primary Care Physician:  Kimmy Riley MD  Date of admission: 2025    Subjective   Subjective   Follow-up for acute on chronic respiratory failure hypoxic    Over past 24 hours:  Patient continues to have shortness of breath with cough and feels he is feeling back in his lungs with difficulty expectoration and clearing his airway patient is presently on MOIZ nebs       Review of Systems  Complains of shortness of breath with cough and difficulty expectorating  Chest discomfort  Remains on nasal cannula  No fever or hemoptysis      Objective   Objective     Vitals:   Temp:  [97.8 °F (36.6 °C)-98.6 °F (37 °C)] 98.1 °F (36.7 °C)  Heart Rate:  [83-95] 84  Resp:  [18-20] 20  BP: (151-174)/(51-69) 156/66  Flow (L/min) (Oxygen Therapy):  [2-5] 3  Physical Exam   Vital Signs Reviewed   General: WDWN, Alert,   Appears chronically ill  HEENT:  PERRL, EOMI.  OP, nares clear, no sinus tenderness  Neck:  Supple, no JVD, no thyromegaly  Chest: Scattered bilateral crackles with rhonchi mainly at the lung bases   CV: RRR, no MGR, pulses 2+, equal.  Abd:  Soft, NT, ND, + BS, no HSM  EXT:  no clubbing, no cyanosis, no edema  Neuro:  A&Ox3, CN grossly intact, no focal deficits.  Skin: No rashes or lesions noted      Result Review    Result Review:  I have personally reviewed the results from the time of this admission to 2/3/2025 11:12 EST and agree with these findings:  [x]  Laboratory  [x]  Microbiology  [x]  Radiology  []  EKG/Telemetry   []  Cardiology/Vascular   []  Pathology  []  Old records  []  Other:  Most notable findings include: Bilateral patchy infiltrates    Assessment & Plan   Assessment / Plan     Active Hospital Problems:  Active Hospital Problems    Diagnosis     **PNA (pneumonia)     1. Incision and drainage of posterior perianal abscess 2. Sharp excisional debridement through skin and subcutaneous  tissue in the posterior perianal area, 9 x 6 x 1 cm     Perianal abscess     Wound of gluteal cleft     Bacterial pneumonia     Bronchiectasis with acute exacerbation     Pneumonia due to Pseudomonas species     Bronchiectasis without complication     COPD exacerbation          Impression:  Acute on chronic-toxemic and hypercapnic respiratory failure  COPD exacerbation severe  Bronchiectasis  Plan:  Continue oxygen along with neb treatments  Continue tobramycin nebs  Continue chest physiotherapy and bronchopulmonary hygiene  Incentive spirometry and flutter  Will repeat chest x-ray  Schedule for bronchoscopy for airway clearance tomorrow    VTE Prophylaxis:  Pharmacologic VTE prophylaxis orders are present.        CODE STATUS:   Code Status (Patient has no pulse and is not breathing): CPR (Attempt to Resuscitate)  Medical Interventions (Patient has pulse or is breathing): Full Support      Electronically signed by Chadd Swan MD, 02/03/25, 11:12 AM EST.    Electronically signed by Chadd Swan MD, 02/03/25, 11:16 AM EST.

## 2025-02-03 NOTE — THERAPY EVALUATION
Acute Care - Physical Therapy Initial Evaluation   [facility]     Patient Name: Preston Wallis  : 1965  MRN: [id number]  Today's Date: 2/3/2025      Visit Dx:     ICD-10-CM ICD-9-CM   1. Pneumonia due to Pseudomonas species, unspecified laterality, unspecified part of lung  J15.1 482.1   2. Urinary tract infection associated with indwelling urethral catheter, initial encounter  T83.511A 996.64    N39.0 599.0   3. Perianal abscess  K61.0 566   4. COPD exacerbation  J44.1 491.21   5. Bronchiectasis without complication  J47.9 494.0   6. Allergy, initial encounter  T78.40XA 995.3   7. Acute hypoxic on chronic hypercapnic respiratory failure  J96.01 518.84    J96.12    8. Tobacco abuse, in remission  F17.201 305.1   9. Chronic dyspnea  R06.09 786.09   10. Obstructive sleep apnea  G47.33 327.23   11. Chronic respiratory failure with hypoxia and hypercapnia  J96.11 518.83    J96.12 799.02     786.09   12. Chronic obstructive pulmonary disease, unspecified COPD type  J44.9 496   13. Wound of gluteal cleft, unspecified laterality, subsequent encounter  S31.809D V58.89     877.0   14. Pneumonia of both lower lobes due to Pneumocystis jirovecii  B59 136.3   15. Bacterial pneumonia  J15.9 482.9   16. Bronchiectasis with acute exacerbation  J47.1 494.1   17. Pneumonia of both lungs due to Pseudomonas species, unspecified part of lung  J15.1 482.1   18. Difficulty walking  R26.2 719.7     Patient Active Problem List   Diagnosis    Chronic cough    Pneumonia due to COVID-19 virus    Polyneuropathy    Paroxysmal atrial fibrillation    Obstructive sleep apnea    MRSA pneumonia    Low back pain    Chronic diastolic (congestive) heart failure    Allergies    COPD exacerbation    Chronic anticoagulation    Benign prostatic hyperplasia    Difficulty using continuous positive airway pressure (CPAP) device    Stage 3a chronic kidney disease    Iron deficiency anemia secondary to inadequate dietary iron intake    Vitamin D deficiency     Class 3 severe obesity with serious comorbidity in adult    Lower extremity edema    Elevated alkaline phosphatase level    Venous insufficiency (chronic) (peripheral)    Tobacco abuse, in remission    History of Pseudomonas pneumonia    Chronic dyspnea    Gastroesophageal reflux disease    Bronchiectasis without complication    ERIC (acute kidney injury)    Altered mental status    Hyperlipidemia    Luetscher's syndrome    Neurogenic bladder    Class 1 obesity    Pneumonia due to Pseudomonas species    Seizures    Primary osteoarthritis of left knee    Other constipation    Chronic obstructive pulmonary disease    Type 2 diabetes mellitus with hyperglycemia, with long-term current use of insulin    Essential hypertension    Stage 3b chronic kidney disease    Annual physical exam    Long-term use of high-risk medication    Personal history of PE (pulmonary embolism)    Encounter for aftercare for healing closed traumatic fracture of left femur    Chronic pain of left knee    Primary osteoarthritis of right knee    Sepsis    Bronchiectasis with acute exacerbation    Bacterial pneumonia    Anemia    Closed fracture of left tibial plateau    Septic joint    Septic arthritis    Skin ulcer of left heel, limited to breakdown of skin    Ulcer of left foot, limited to breakdown of skin    Left foot pain    Wheezing    Acute on chronic respiratory failure with hypercapnia    Chronic respiratory failure with hypoxia and hypercapnia    Acute hypoxic on chronic hypercapnic respiratory failure    Impaired cognition    Atherosclerosis of native arteries of the extremities with ulceration    PNA (pneumonia)    Perianal abscess    1. Incision and drainage of posterior perianal abscess 2. Sharp excisional debridement through skin and subcutaneous tissue in the posterior perianal area, 9 x 6 x 1 cm    Wound of gluteal cleft     Past Medical History:   Diagnosis Date    1. Incision and drainage of posterior perianal abscess 2. Sharp  "excisional debridement through skin and subcutaneous tissue in the posterior perianal area, 9 x 6 x 1 cm 01/26/2025    Age-related cognitive decline     Allergic contact dermatitis     Allergies     Anemia     Bedbound     11/2023 \"MY LEG MUSCLES STOPPED WORKING\"    Bronchiectasis with acute lower respiratory infection     Charcot foot due to diabetes mellitus 09/10/2013    Chronic diastolic (congestive) heart failure     Chronic kidney disease     Chronic respiratory failure with hypoxia     Closed supracondylar fracture of femur 01/12/2022    COPD (chronic obstructive pulmonary disease)     Deep vein thrombosis (DVT) of lower extremity associated with air travel 01/13/2023    Dependence on supplemental oxygen     Eczema     Erectile dysfunction     due to organic reasons    Essential (primary) hypertension     Fracture     closed fracture of other tarsal and metatarsal bones    Fracture of proximal humerus 01/13/2023    GERD without esophagitis     High risk medication use     Hypercholesteremia     Hypomagnesemia     Infected stasis ulcer of left lower extremity 01/13/2023    Insomnia     Low back pain     Major depressive disorder     Morbid (severe) obesity due to excess calories     MRSA pneumonia     Muscle weakness     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 without evidence of necrosis     Obstructive sleep apnea (adult) (pediatric)     On home O2     REPORTS WEARING 2L/NC AAT    Other forms of dyspnea     Other long term (current) drug therapy     Other specified noninfective gastroenteritis and colitis     Other spondylosis, lumbar region     Pain in both knees     Paroxysmal atrial fibrillation     Peripheral neuropathy     attributed to type 2 diabetes    Pneumonia, unspecified organism     Polyneuropathy     Rash and other nonspecific skin eruption     Self-catheterizes urinary bladder     EVERY 4 HOURS    Smoking     \"SOMETIMES\"    Syncope and collapse     " Tachycardia     Tinnitus 01/13/2023    Type 1 diabetes mellitus with diabetic chronic kidney disease     Type 2 diabetes mellitus     Unspecified fall, initial encounter     Urinary retention      Past Surgical History:   Procedure Laterality Date    BRONCHOSCOPY N/A 1/28/2025    Procedure: BRONCHOSCOPY: BAL: insertion of lighted instrument to view inside the lung;  Surgeon: Walter Nicole DO;  Location: [facility] OR;  Service: Pulmonary;  Laterality: N/A;    CARDIAC CATHETERIZATION Left 8/15/2024    Procedure: Carbon dioxide aortogram with left leg angiogram, possible angioplasty or stenting;  Surgeon: Moshe Willson MD;  Location: [facility] CATH INVASIVE LOC[facility];  Service: Vascular;  Laterality: Left;    CHOLECYSTECTOMY      CYSTOSCOPY      FEMUR SURGERY Left     Shravan placed    INCISION AND DRAINAGE ABSCESS N/A 1/26/2025    Procedure: INCISION AND DRAINAGE ABSCESS; plain text: incision and drain pus from buttocks wound;  Surgeon: Emerson Canada MD;  Location: [facility];  Service: General;  Laterality: N/A;    KNEE SURGERY Left     OTHER SURGICAL HISTORY Left     venous port, REMOVED    PORTACATH PLACEMENT Right     TIBIAL PLATEAU OPEN REDUCTION INTERNAL FIXATION Left 12/22/2023    Procedure: TIBIAL PLATEAU OPEN REDUCTION INTERNAL FIXATION;  Surgeon: Hugo Kline MD;  Location: [facility];  Service: Orthopedics;  Laterality: Left;    TONSILLECTOMY AND ADENOIDECTOMY       PT Assessment (Last 12 Hours)       PT Evaluation and Treatment       Row Name 02/03/25 1500          Physical Therapy Time and Intention    Subjective Information no complaints  -CS     Document Type evaluation  -CS     Mode of Treatment individual therapy;physical therapy  -CS     Patient Effort poor  -CS     Symptoms Noted During/After Treatment none  -CS       Row Name 02/03/25 1500          General Information    Patient Profile Reviewed yes  -CS     Patient Observations alert  -CS     Prior Level of Function  max assist:;dependent:;transfer;bed mobility;ADL's  -CS     Equipment Currently Used at Home wheelchair;slide board;oxygen;hospital bed  -CS     Existing Precautions/Restrictions fall  -CS     Barriers to Rehab previous functional deficit  -CS       Row Name 02/03/25 1500          Living Environment    Current Living Arrangements home  -CS     Home Accessibility wheelchair accessible  -CS     People in Home spouse;child(chon), adult  son  -CS     Primary Care Provided by spouse/significant other;child(chon)  -CS       Row Name 02/03/25 1500          Pain    Pain Location buttock;foot  -CS     Pain Side/Orientation left  left foot  -CS     Additional Documentation Pain Scale: FACES Pre/Post-Treatment (Group)  -CS       Row Name 02/03/25 1500          Pain Scale: FACES Pre/Post-Treatment    Pain: FACES Scale, Pretreatment 4-->hurts little more  -CS     Posttreatment Pain Rating 4-->hurts little more  -CS       Row Name 02/03/25 1500          Cognition    Orientation Status (Cognition) oriented to;person;place  -CS       Row Name 02/03/25 1500          Range of Motion Comprehensive    General Range of Motion lower extremity range of motion deficits identified  -CS     Comment, General Range of Motion limited AROM; AAROM WFL  -CS       Row Name 02/03/25 1500          Strength Comprehensive (MMT)    General Manual Muscle Testing (MMT) Assessment lower extremity strength deficits identified  -CS     Comment, General Manual Muscle Testing (MMT) Assessment BLEs assessed at 2+/5  -CS       Row Name 02/03/25 1500          Bed Mobility    Bed Mobility bed mobility (all) activities  -CS     All Activities, [place] (Bed Mobility) verbal cues;maximum assist (25% patient effort);dependent (less than 25% patient effort)  -CS     Bed Mobility, Safety Issues decreased use of legs for bridging/pushing;decreased use of arms for pushing/pulling  -CS     Assistive Device (Bed Mobility) bed rails;head of bed elevated  -CS     Comment,  "(Bed Mobility) Pt uses sliding board at home when moving out of his hospital bed to wheelchair. He reports not doing this recently or \"quite some time\". Only transfers to his wheelchair when absolutely necessary. No further transfers  -       Row Name 02/03/25 1500          Gait/Stairs (Locomotion)    [place] Level (Gait) not tested  -     Patient was able to Ambulate no, other medical factors prevent ambulation  -     Reason Patient was unable to Ambulate Non-Ambulatory at Baseline  -       Row Name 02/03/25 1500          Safety Issues/Impairments Affecting Functional Mobility    Impairments Affecting Function (Mobility) endurance/activity tolerance;pain;strength;range of motion (ROM)  -       Row Name             Wound 12/22/23 1322 Left posterior heel Pressure Injury    Wound - Properties Group Placement Date: 12/22/23  -LH Placement Time: 1322  -LH Side: Left  -LH Orientation: posterior  -LH Location: heel  -LH Primary Wound Type: Pressure inj  -LH    Retired Wound - Properties Group Placement Date: 12/22/23  -LH Placement Time: 1322  -LH Side: Left  -LH Orientation: posterior  -LH Location: heel  -LH Primary Wound Type: Pressure inj  -LH    Retired Wound - Properties Group Placement Date: 12/22/23  -LH Placement Time: 1322  -LH Side: Left  -LH Orientation: posterior  -LH Location: heel  -LH Primary Wound Type: Pressure inj  -LH    Retired Wound - Properties Group Date first assessed: 12/22/23  -LH Time first assessed: 1322  -LH Side: Left  -LH Location: heel  -LH Primary Wound Type: Pressure inj  -LH      Row Name             Wound 01/23/25 2330 coccyx pressure injury    Wound - Properties Group Placement Date: 01/23/25 -SW Placement Time: 2330 -SW Location: coccyx  -SW Primary Wound Type: Pressure inj  -SW Type: pressure injury  -SW Present on Original Admission: Y  -SW    Retired Wound - Properties Group Placement Date: 01/23/25  -SW Placement Time: 2330 -SW Present on Original Admission: Y  " -SW Location: coccyx  -SW Primary Wound Type: Pressure inj  -SW Type: pressure injury  -SW    Retired Wound - Properties Group Placement Date: 01/23/25  -SW Placement Time: 2330 -SW Present on Original Admission: Y  -SW Location: coccyx  -SW Primary Wound Type: Pressure inj  -SW Type: pressure injury  -SW    Retired Wound - Properties Group Date first assessed: 01/23/25  -SW Time first assessed: 2330  -SW Present on Original Admission: Y  -SW Location: coccyx  -SW Primary Wound Type: Pressure inj  -SW Type: pressure injury  -SW      Row Name             Wound Left lower arm skin tear    Wound - Properties Group Side: Left  -MB Orientation: lower  -MB Location: arm  -MB Primary Wound Type: Skin tear  -MB Type: skin tear  -MB    Retired Wound - Properties Group Side: Left  -MB Orientation: lower  -MB Location: arm  -MB Primary Wound Type: Skin tear  -MB Type: skin tear  -MB    Retired Wound - Properties Group Side: Left  -MB Orientation: lower  -MB Location: arm  -MB Primary Wound Type: Skin tear  -MB Type: skin tear  -MB    Retired Wound - Properties Group Side: Left  -MB Location: arm  -MB Primary Wound Type: Skin tear  -MB Type: skin tear  -MB      Row Name             Wound 02/16/24 1700 Left posterior foot Pressure Injury    Wound - Properties Group Placement Date: 02/16/24  -AG Placement Time: 1700  -AG Side: Left  -AG Orientation: posterior  -AG Location: foot  -AG Primary Wound Type: Pressure inj  -AG    Retired Wound - Properties Group Placement Date: 02/16/24  -AG Placement Time: 1700  -AG Side: Left  -AG Orientation: posterior  -AG Location: foot  -AG Primary Wound Type: Pressure inj  -AG    Retired Wound - Properties Group Placement Date: 02/16/24  -AG Placement Time: 1700  -AG Side: Left  -AG Orientation: posterior  -AG Location: foot  -AG Primary Wound Type: Pressure inj  -AG    Retired Wound - Properties Group Date first assessed: 02/16/24  -AG Time first assessed: 1700  -AG Side: Left  -AG Location:  foot  -AG Primary Wound Type: Pressure inj  -AG      Row Name 02/03/25 1500          Plan of Care Review    Plan of Care Reviewed With patient  -CS     Progress no change  -CS     Outcome Evaluation Patient currently not appropriate for continued physical therapy services as he is nonambulatory at baseline and has not been transferring to his wheelchair unless absolutely necessary.  Patient is dependent with all ADLs as well.  He will be discharged from physical therapy caseload.  -CS       Row Name 02/03/25 1500          Positioning and Restraints    Pre-Treatment Position in bed  -CS     Post Treatment Position bed  -CS     In Bed supine;call light within reach;encouraged to call for assist;exit alarm on  -CS       Row Name 02/03/25 1500          Therapy Assessment/Plan (PT)    Criteria for Skilled Interventions Met (PT) does not meet criteria for skilled intervention  -CS     Therapy Frequency (PT) evaluation only  -CS       Row Name 02/03/25 1500          PT Evaluation Complexity    History, PT Evaluation Complexity 3 or more personal factors and/or comorbidities  -CS     Examination of Body Systems (PT Eval Complexity) total of 3 or more elements  -CS     Clinical Presentation (PT Evaluation Complexity) stable  -CS     Clinical Decision Making (PT Evaluation Complexity) low complexity  -CS     Overall Complexity (PT Evaluation Complexity) low complexity  -CS       Row Name 02/03/25 1500          Therapy Plan Review/Discharge Plan (PT)    Therapy Plan Review (PT) evaluation/treatment results reviewed;patient  -CS       Row Name 02/03/25 1500          Physical Therapy Goals    Problem Specific Goal Selection (PT) problem specific goal 1, PT  -CS       Row Name 02/03/25 1500          Problem Specific Goal 1 (PT)    Problem Specific Goal 1 (PT) Complete PT evaluation  -CS     Time Frame (Problem Specific Goal 1, PT) by discharge  -CS     Progress/Outcome (Problem Specific Goal 1, PT) goal met  -CS                User Key  (r) = Recorded By, (t) = Taken By, (c) = Cosigned By      Initials Name Provider Type    Carmen Wise RN Registered Nurse    Cindy Lorenzo RN Registered Nurse    Ayanna Harper RN Registered Nurse    Virginia Sewell, PT Physical Therapist    Florecita Burgess RN Registered Nurse                    Physical Therapy Education        No education to display                  PT Recommendation and Plan  Anticipated Discharge Disposition (PT): home with 24/7 care, home with assist, extended care facility (if family unable to care for patient, recommend LTC)  Therapy Frequency (PT): evaluation only  Plan of Care Reviewed With: patient  Progress: no change  Outcome Evaluation: Patient currently not appropriate for continued physical therapy services as he is nonambulatory at baseline and has not been transferring to his wheelchair unless absolutely necessary.  Patient is dependent with all ADLs as well.  He will be discharged from physical therapy caseload.   Outcome Measures       Row Name 02/03/25 1500             How much help from another person do you currently need...    Turning from your back to your side while in flat bed without using bedrails? 2  -CS      Moving from lying on back to sitting on the side of a flat bed without bedrails? 1  -CS      Moving to and from a bed to a chair (including a wheelchair)? 1  -CS      Standing up from a chair using your arms (e.g., wheelchair, bedside chair)? 1  -CS      Climbing 3-5 steps with a railing? 1  -CS      To walk in hospital room? 1  -CS      AM-PAC 6 Clicks Score (PT) 7  -CS         Functional Assessment    Outcome Measure Options AM-PAC 6 Clicks Basic Mobility (PT)  -CS                User Key  (r) = Recorded By, (t) = Taken By, (c) = Cosigned By      Initials Name Provider Type    CS Virginia Rodriguez, PT Physical Therapist                     Time Calculation:    PT Charges       Row Name 02/03/25 0424             Time Calculation     PT Received On 02/03/25  -CS         Untimed Charges    PT Eval/Re-eval Minutes 25  -CS         Total Minutes    Untimed Charges Total Minutes 25  -CS       Total Minutes 25  -CS                User Key  (r) = Recorded By, (t) = Taken By, (c) = Cosigned By      Initials Name Provider Type    CS Virginia Rodriguez, EMMANUELLE Physical Therapist                  Therapy Charges for Today       Code Description Service Date Service Provider Modifiers Qty    [id number] HC PT EVAL LOW COMPLEXITY 2 2/3/2025 Virginia Rodriguez PT GP 1            PT G-Codes  Outcome Measure Options: AM-PAC 6 Clicks Basic Mobility (PT)  AM-PAC 6 Clicks Score (PT): 7    Virginia Rodriguez PT  2/3/2025

## 2025-02-04 LAB
ANION GAP SERPL CALCULATED.3IONS-SCNC: 6.9 MMOL/L (ref 5–15)
BASOPHILS # BLD AUTO: 0.01 10*3/MM3 (ref 0–0.2)
BASOPHILS NFR BLD AUTO: 0.1 % (ref 0–1.5)
BUN SERPL-MCNC: 44 MG/DL (ref 6–20)
BUN/CREAT SERPL: 21.4 (ref 7–25)
CALCIUM SPEC-SCNC: 8.4 MG/DL (ref 8.6–10.5)
CHLORIDE SERPL-SCNC: 98 MMOL/L (ref 98–107)
CO2 SERPL-SCNC: 38.1 MMOL/L (ref 22–29)
CREAT SERPL-MCNC: 2.06 MG/DL (ref 0.76–1.27)
DEPRECATED RDW RBC AUTO: 45.8 FL (ref 37–54)
EGFRCR SERPLBLD CKD-EPI 2021: 36.4 ML/MIN/1.73
EOSINOPHIL # BLD AUTO: 0.52 10*3/MM3 (ref 0–0.4)
EOSINOPHIL NFR BLD AUTO: 5.1 % (ref 0.3–6.2)
ERYTHROCYTE [DISTWIDTH] IN BLOOD BY AUTOMATED COUNT: 14.2 % (ref 12.3–15.4)
GLUCOSE BLDC GLUCOMTR-MCNC: 171 MG/DL (ref 70–99)
GLUCOSE BLDC GLUCOMTR-MCNC: 197 MG/DL (ref 70–99)
GLUCOSE BLDC GLUCOMTR-MCNC: 278 MG/DL (ref 70–99)
GLUCOSE BLDC GLUCOMTR-MCNC: 68 MG/DL (ref 70–99)
GLUCOSE BLDC GLUCOMTR-MCNC: 84 MG/DL (ref 70–99)
GLUCOSE SERPL-MCNC: 92 MG/DL (ref 65–99)
HCT VFR BLD AUTO: 25.8 % (ref 37.5–51)
HGB BLD-MCNC: 8 G/DL (ref 13–17.7)
IMM GRANULOCYTES # BLD AUTO: 0.06 10*3/MM3 (ref 0–0.05)
IMM GRANULOCYTES NFR BLD AUTO: 0.6 % (ref 0–0.5)
LYMPHOCYTES # BLD AUTO: 1.57 10*3/MM3 (ref 0.7–3.1)
LYMPHOCYTES NFR BLD AUTO: 15.5 % (ref 19.6–45.3)
MCH RBC QN AUTO: 27.7 PG (ref 26.6–33)
MCHC RBC AUTO-ENTMCNC: 31 G/DL (ref 31.5–35.7)
MCV RBC AUTO: 89.3 FL (ref 79–97)
MONOCYTES # BLD AUTO: 1.34 10*3/MM3 (ref 0.1–0.9)
MONOCYTES NFR BLD AUTO: 13.3 % (ref 5–12)
NEUTROPHILS NFR BLD AUTO: 6.6 10*3/MM3 (ref 1.7–7)
NEUTROPHILS NFR BLD AUTO: 65.4 % (ref 42.7–76)
NRBC BLD AUTO-RTO: 0 /100 WBC (ref 0–0.2)
PLATELET # BLD AUTO: 228 10*3/MM3 (ref 140–450)
PMV BLD AUTO: 8.8 FL (ref 6–12)
POTASSIUM SERPL-SCNC: 3.9 MMOL/L (ref 3.5–5.2)
RBC # BLD AUTO: 2.89 10*6/MM3 (ref 4.14–5.8)
SODIUM SERPL-SCNC: 143 MMOL/L (ref 136–145)
WBC NRBC COR # BLD AUTO: 10.1 10*3/MM3 (ref 3.4–10.8)

## 2025-02-04 PROCEDURE — 94761 N-INVAS EAR/PLS OXIMETRY MLT: CPT

## 2025-02-04 PROCEDURE — 99232 SBSQ HOSP IP/OBS MODERATE 35: CPT | Performed by: INTERNAL MEDICINE

## 2025-02-04 PROCEDURE — 94799 UNLISTED PULMONARY SVC/PX: CPT

## 2025-02-04 PROCEDURE — 94664 DEMO&/EVAL PT USE INHALER: CPT

## 2025-02-04 PROCEDURE — 80048 BASIC METABOLIC PNL TOTAL CA: CPT | Performed by: INTERNAL MEDICINE

## 2025-02-04 PROCEDURE — 25010000002 ENOXAPARIN PER 10 MG: Performed by: INTERNAL MEDICINE

## 2025-02-04 PROCEDURE — 63710000001 REVEFENACIN 175 MCG/3ML SOLUTION: Performed by: INTERNAL MEDICINE

## 2025-02-04 PROCEDURE — 99232 SBSQ HOSP IP/OBS MODERATE 35: CPT | Performed by: STUDENT IN AN ORGANIZED HEALTH CARE EDUCATION/TRAINING PROGRAM

## 2025-02-04 PROCEDURE — 94669 MECHANICAL CHEST WALL OSCILL: CPT

## 2025-02-04 PROCEDURE — 25010000002 PIPERACILLIN SOD-TAZOBACTAM PER 1 G: Performed by: STUDENT IN AN ORGANIZED HEALTH CARE EDUCATION/TRAINING PROGRAM

## 2025-02-04 PROCEDURE — 25010000002 LINEZOLID 600 MG/300ML SOLUTION: Performed by: STUDENT IN AN ORGANIZED HEALTH CARE EDUCATION/TRAINING PROGRAM

## 2025-02-04 PROCEDURE — 85025 COMPLETE CBC W/AUTO DIFF WBC: CPT | Performed by: STUDENT IN AN ORGANIZED HEALTH CARE EDUCATION/TRAINING PROGRAM

## 2025-02-04 PROCEDURE — 63710000001 INSULIN LISPRO (HUMAN) PER 5 UNITS: Performed by: INTERNAL MEDICINE

## 2025-02-04 PROCEDURE — 82948 REAGENT STRIP/BLOOD GLUCOSE: CPT | Performed by: INTERNAL MEDICINE

## 2025-02-04 PROCEDURE — 82948 REAGENT STRIP/BLOOD GLUCOSE: CPT

## 2025-02-04 RX ORDER — SIMETHICONE 80 MG
80 TABLET,CHEWABLE ORAL 4 TIMES DAILY PRN
Status: DISCONTINUED | OUTPATIENT
Start: 2025-02-04 | End: 2025-02-20 | Stop reason: HOSPADM

## 2025-02-04 RX ADMIN — HYDROCODONE BITARTRATE AND ACETAMINOPHEN 1 TABLET: 5; 325 TABLET ORAL at 22:35

## 2025-02-04 RX ADMIN — BUDESONIDE 0.5 MG: 0.5 INHALANT RESPIRATORY (INHALATION) at 10:40

## 2025-02-04 RX ADMIN — PIPERACILLIN AND TAZOBACTAM 3.38 G: 3; .375 INJECTION, POWDER, FOR SOLUTION INTRAVENOUS at 12:52

## 2025-02-04 RX ADMIN — INSULIN LISPRO 2 UNITS: 100 INJECTION, SOLUTION INTRAVENOUS; SUBCUTANEOUS at 17:36

## 2025-02-04 RX ADMIN — BUSPIRONE HYDROCHLORIDE 15 MG: 5 TABLET ORAL at 08:16

## 2025-02-04 RX ADMIN — SENNOSIDES AND DOCUSATE SODIUM 2 TABLET: 50; 8.6 TABLET ORAL at 22:14

## 2025-02-04 RX ADMIN — PIPERACILLIN AND TAZOBACTAM 3.38 G: 3; .375 INJECTION, POWDER, FOR SOLUTION INTRAVENOUS at 22:19

## 2025-02-04 RX ADMIN — ARFORMOTEROL TARTRATE 15 MCG: 15 SOLUTION RESPIRATORY (INHALATION) at 19:02

## 2025-02-04 RX ADMIN — PIPERACILLIN AND TAZOBACTAM 3.38 G: 3; .375 INJECTION, POWDER, FOR SOLUTION INTRAVENOUS at 05:13

## 2025-02-04 RX ADMIN — FINASTERIDE 5 MG: 5 TABLET, FILM COATED ORAL at 08:16

## 2025-02-04 RX ADMIN — GUAIFENESIN 600 MG: 600 TABLET ORAL at 08:16

## 2025-02-04 RX ADMIN — CARVEDILOL 25 MG: 25 TABLET, FILM COATED ORAL at 08:16

## 2025-02-04 RX ADMIN — SIMETHICONE 80 MG: 80 TABLET, CHEWABLE ORAL at 17:36

## 2025-02-04 RX ADMIN — FAMOTIDINE 40 MG: 20 TABLET ORAL at 06:46

## 2025-02-04 RX ADMIN — INSULIN LISPRO 6 UNITS: 100 INJECTION, SOLUTION INTRAVENOUS; SUBCUTANEOUS at 12:01

## 2025-02-04 RX ADMIN — SODIUM HYPOCHLORITE: 1.25 SOLUTION TOPICAL at 22:15

## 2025-02-04 RX ADMIN — INSULIN LISPRO 2 UNITS: 100 INJECTION, SOLUTION INTRAVENOUS; SUBCUTANEOUS at 22:14

## 2025-02-04 RX ADMIN — Medication 10 ML: at 08:17

## 2025-02-04 RX ADMIN — ENOXAPARIN SODIUM 40 MG: 100 INJECTION SUBCUTANEOUS at 22:35

## 2025-02-04 RX ADMIN — BUDESONIDE 0.5 MG: 0.5 INHALANT RESPIRATORY (INHALATION) at 19:01

## 2025-02-04 RX ADMIN — BUMETANIDE 2 MG: 1 TABLET ORAL at 08:16

## 2025-02-04 RX ADMIN — ASPIRIN 81 MG: 81 TABLET, COATED ORAL at 06:46

## 2025-02-04 RX ADMIN — SODIUM HYPOCHLORITE: 1.25 SOLUTION TOPICAL at 12:05

## 2025-02-04 RX ADMIN — TOBRAMYCIN 300 MG: 300 SOLUTION RESPIRATORY (INHALATION) at 19:02

## 2025-02-04 RX ADMIN — MONTELUKAST 10 MG: 10 TABLET, FILM COATED ORAL at 22:14

## 2025-02-04 RX ADMIN — CARVEDILOL 25 MG: 25 TABLET, FILM COATED ORAL at 22:14

## 2025-02-04 RX ADMIN — BUSPIRONE HYDROCHLORIDE 15 MG: 5 TABLET ORAL at 22:14

## 2025-02-04 RX ADMIN — LINEZOLID 600 MG: 600 INJECTION, SOLUTION INTRAVENOUS at 22:19

## 2025-02-04 RX ADMIN — FERROUS SULFATE TAB 325 MG (65 MG ELEMENTAL FE) 325 MG: 325 (65 FE) TAB at 08:16

## 2025-02-04 RX ADMIN — ARFORMOTEROL TARTRATE 15 MCG: 15 SOLUTION RESPIRATORY (INHALATION) at 10:40

## 2025-02-04 RX ADMIN — REVEFENACIN 175 MCG: 175 SOLUTION RESPIRATORY (INHALATION) at 10:40

## 2025-02-04 RX ADMIN — TOBRAMYCIN 300 MG: 300 SOLUTION RESPIRATORY (INHALATION) at 10:41

## 2025-02-04 RX ADMIN — GUAIFENESIN 600 MG: 600 TABLET ORAL at 22:14

## 2025-02-04 RX ADMIN — ATORVASTATIN CALCIUM 20 MG: 10 TABLET, FILM COATED ORAL at 08:16

## 2025-02-04 RX ADMIN — BUMETANIDE 2 MG: 1 TABLET ORAL at 22:14

## 2025-02-04 RX ADMIN — LINEZOLID 600 MG: 600 INJECTION, SOLUTION INTRAVENOUS at 08:16

## 2025-02-04 RX ADMIN — TAMSULOSIN HYDROCHLORIDE 0.4 MG: 0.4 CAPSULE ORAL at 08:16

## 2025-02-04 NOTE — SIGNIFICANT NOTE
Wound Eval / Progress Noted    YAIMA Stockton     Patient Name: Preston Wallis  : 1965  MRN: [id number]  Today's Date: 2025                 Admit Date: 2025    Visit Dx:    ICD-10-CM ICD-9-CM   1. Pneumonia due to Pseudomonas species, unspecified laterality, unspecified part of lung  J15.1 482.1   2. Urinary tract infection associated with indwelling urethral catheter, initial encounter  T83.511A 996.64    N39.0 599.0   3. Perianal abscess  K61.0 566   4. COPD exacerbation  J44.1 491.21   5. Bronchiectasis without complication  J47.9 494.0   6. Allergy, initial encounter  T78.40XA 995.3   7. Acute hypoxic on chronic hypercapnic respiratory failure  J96.01 518.84    J96.12    8. Tobacco abuse, in remission  F17.201 305.1   9. Chronic dyspnea  R06.09 786.09   10. Obstructive sleep apnea  G47.33 327.23   11. Chronic respiratory failure with hypoxia and hypercapnia  J96.11 518.83    J96.12 799.02     786.09   12. Chronic obstructive pulmonary disease, unspecified COPD type  J44.9 496   13. Wound of gluteal cleft, unspecified laterality, subsequent encounter  S31.809D V58.89     877.0   14. Pneumonia of both lower lobes due to Pneumocystis jirovecii  B59 136.3   15. Bacterial pneumonia  J15.9 482.9   16. Bronchiectasis with acute exacerbation  J47.1 494.1   17. Pneumonia of both lungs due to Pseudomonas species, unspecified part of lung  J15.1 482.1   18. Difficulty walking  R26.2 719.7         PNA (pneumonia)    COPD exacerbation    Bronchiectasis without complication    Pneumonia due to Pseudomonas species    Bronchiectasis with acute exacerbation    Bacterial pneumonia    Perianal abscess    1. Incision and drainage of posterior perianal abscess 2. Sharp excisional debridement through skin and subcutaneous tissue in the posterior perianal area, 9 x 6 x 1 cm    Wound of gluteal cleft        Past Medical History:   Diagnosis Date    1. Incision and drainage of posterior perianal abscess 2. Sharp excisional  "debridement through skin and subcutaneous tissue in the posterior perianal area, 9 x 6 x 1 cm 01/26/2025    Age-related cognitive decline     Allergic contact dermatitis     Allergies     Anemia     Bedbound     11/2023 \"MY LEG MUSCLES STOPPED WORKING\"    Bronchiectasis with acute lower respiratory infection     Charcot foot due to diabetes mellitus 09/10/2013    Chronic diastolic (congestive) heart failure     Chronic kidney disease     Chronic respiratory failure with hypoxia     Closed supracondylar fracture of femur 01/12/2022    COPD (chronic obstructive pulmonary disease)     Deep vein thrombosis (DVT) of lower extremity associated with air travel 01/13/2023    Dependence on supplemental oxygen     Eczema     Erectile dysfunction     due to organic reasons    Essential (primary) hypertension     Fracture     closed fracture of other tarsal and metatarsal bones    Fracture of proximal humerus 01/13/2023    GERD without esophagitis     High risk medication use     Hypercholesteremia     Hypomagnesemia     Infected stasis ulcer of left lower extremity 01/13/2023    Insomnia     Low back pain     Major depressive disorder     Morbid (severe) obesity due to excess calories     MRSA pneumonia     Muscle weakness     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 without evidence of necrosis     Obstructive sleep apnea (adult) (pediatric)     On home O2     REPORTS WEARING 2L/NC AAT    Other forms of dyspnea     Other long term (current) drug therapy     Other specified noninfective gastroenteritis and colitis     Other spondylosis, lumbar region     Pain in both knees     Paroxysmal atrial fibrillation     Peripheral neuropathy     attributed to type 2 diabetes    Pneumonia, unspecified organism     Polyneuropathy     Rash and other nonspecific skin eruption     Self-catheterizes urinary bladder     EVERY 4 HOURS    Smoking     \"SOMETIMES\"    Syncope and collapse     Tachycardia  "    Tinnitus 01/13/2023    Type 1 diabetes mellitus with diabetic chronic kidney disease     Type 2 diabetes mellitus     Unspecified fall, initial encounter     Urinary retention         Past Surgical History:   Procedure Laterality Date    BRONCHOSCOPY N/A 1/28/2025    Procedure: BRONCHOSCOPY: BAL: insertion of lighted instrument to view inside the lung;  Surgeon: Walter Nicole DO;  Location: [facility] OR;  Service: Pulmonary;  Laterality: N/A;    BRONCHOSCOPY N/A 2/3/2025    Procedure: BRONCHOSCOPY WITH BRONCHOALVEOLAR LAVAGE, POSSIBLE BIOPSY, BRUSHING, WASHING, AIRWAY INSPECTION: insertion of lighted instrument to view inside the lung;  Surgeon: Chadd Swan MD;  Location: [facility] OR;  Service: Pulmonary;  Laterality: N/A;    CARDIAC CATHETERIZATION Left 8/15/2024    Procedure: Carbon dioxide aortogram with left leg angiogram, possible angioplasty or stenting;  Surgeon: Moshe Willson MD;  Location: [facility] CATH INVASIVE LOCATION;  Service: Vascular;  Laterality: Left;    CHOLECYSTECTOMY      CYSTOSCOPY      FEMUR SURGERY Left     Shravan placed    INCISION AND DRAINAGE ABSCESS N/A 1/26/2025    Procedure: INCISION AND DRAINAGE ABSCESS; plain text: incision and drain pus from buttocks wound;  Surgeon: Emerson Canada MD;  Location: [facility];  Service: General;  Laterality: N/A;    KNEE SURGERY Left     OTHER SURGICAL HISTORY Left     venous port, REMOVED    PORTACATH PLACEMENT Right     TIBIAL PLATEAU OPEN REDUCTION INTERNAL FIXATION Left 12/22/2023    Procedure: TIBIAL PLATEAU OPEN REDUCTION INTERNAL FIXATION;  Surgeon: Hugo Kline MD;  Location: [facility];  Service: Orthopedics;  Laterality: Left;    TONSILLECTOMY AND ADENOIDECTOMY           Physical Assessment:  Wound 02/16/24 1700 Left posterior foot Pressure Injury (Active)   Wound Image    02/04/25 1200   Pressure Injury Stage U 02/04/25 1200   Dressing Appearance dry;intact 02/04/25 1200   Closure None 02/04/25  1200   Base dry;black;eschar;moist;red;yellow;slough 02/04/25 1200   Black (%), Wound Tissue Color 90 02/04/25 1200   Red (%), Wound Tissue Color 5 02/04/25 1200   Yellow (%), Wound Tissue Color 5 02/04/25 1200   Periwound dry;pink 02/04/25 1200   Periwound Temperature warm 02/04/25 1200   Periwound Skin Turgor soft 02/04/25 1200   Edges open 02/04/25 1200   Wound Length (cm) 3.2 cm 02/04/25 1200   Wound Width (cm) 3.2 cm 02/04/25 1200   Wound Depth (cm) 0.1 cm 02/04/25 1200   Wound Surface Area (cm^2) 10.24 cm^2 02/04/25 1200   Wound Volume (cm^3) 1.024 cm^3 02/04/25 1200   Drainage Characteristics/Odor serosanguineous 02/04/25 1200   Drainage Amount scant 02/04/25 1200   Care, Wound cleansed with;sterile normal saline 02/04/25 1200   Dressing Care dressing removed;dressing applied;other (see comments);gauze, dry;gauze;tubular wrap 02/04/25 1200   Periwound Care absorptive dressing applied 02/04/25 1200       Wound 01/23/25 2330 coccyx pressure injury (Active)   Wound Image    02/04/25 1200   Pressure Injury Stage 3 02/04/25 1200   Dressing Appearance dressing loose;moist drainage 02/04/25 1200   Closure None 02/04/25 1200   Base moist;red;yellow;subcutaneous;slough;brown;maroon/purple 02/04/25 1200   Periwound dry;pink;redness 02/04/25 1200   Periwound Temperature warm 02/04/25 1200   Periwound Skin Turgor soft 02/04/25 1200   Edges open 02/04/25 1200   Wound Length (cm) 7 cm 02/04/25 1200   Wound Width (cm) 5 cm 02/04/25 1200   Wound Depth (cm) 2.1 cm 02/04/25 1200   Wound Surface Area (cm^2) 35 cm^2 02/04/25 1200   Wound Volume (cm^3) 73.5 cm^3 02/04/25 1200   Drainage Characteristics/Odor serosanguineous;creamy;brown 02/04/25 1200   Drainage Amount scant 02/04/25 1200   Care, Wound cleansed with;irrigated with;sterile normal saline 02/04/25 1200   Dressing Care dressing removed;dressing applied;other (see comments);silicone border foam 02/04/25 1200   Periwound Care absorptive dressing applied 02/04/25 1200        Wound Left lower arm skin tear (Active)   Wound Image   02/04/25 1200   Dressing Appearance dry;intact 02/04/25 1200   Closure None 02/04/25 1200   Base moist;red 02/04/25 1200   Red (%), Wound Tissue Color 100 02/04/25 1200   Periwound dry;pink 02/04/25 1200   Periwound Temperature warm 02/04/25 1200   Periwound Skin Turgor soft 02/04/25 1200   Edges open 02/04/25 1200   Drainage Characteristics/Odor serosanguineous 02/04/25 1200   Drainage Amount small 02/04/25 1200   Care, Wound cleansed with;sterile normal saline 02/04/25 1200   Dressing Care dressing removed;dressing applied;silver impregnated;hydrofiber;silicone border foam 02/04/25 1200   Periwound Care absorptive dressing applied 02/04/25 1200       Wound 02/04/25 1200 Left anterior thigh unspecified (Active)   Wound Image   02/04/25 1200   Dressing Appearance dry;intact 02/04/25 1200   Closure None 02/04/25 1200   Base dry;yellow;tan;slough;red 02/04/25 1200   Periwound dry;pink 02/04/25 1200   Periwound Temperature warm 02/04/25 1200   Periwound Skin Turgor soft 02/04/25 1200   Edges open 02/04/25 1200   Wound Length (cm) 1.2 cm 02/04/25 1200   Wound Width (cm) 0.6 cm 02/04/25 1200   Wound Depth (cm) 0.1 cm 02/04/25 1200   Wound Surface Area (cm^2) 0.72 cm^2 02/04/25 1200   Wound Volume (cm^3) 0.072 cm^3 02/04/25 1200   Drainage Amount none 02/04/25 1200   Care, Wound cleansed with;sterile normal saline 02/04/25 1200   Dressing Care dressing removed;dressing applied;silver impregnated;hydrofiber;other (see comments) 02/04/25 1200   Periwound Care dry periwound area maintained 02/04/25 1200    Left plantar forefoot    Wound Check / Follow-up:  Patient seen today for wound follow up. Patient is awake, alert, and oriented. Patient requested bed pan upon entry into room; however, he was incontinent of stool prior to. Incontinence care and linen change provided with assistance from primary PCA. Patient reports plan for diverting colostomy placement on 2/6/25  to assist with perirectal wound treatment.  Patient underwent an incision and drainage of perianal abscess on 1/26/2025.  Patient reports he has been bedridden for approximately 2 to 3 years due to the wound on his left heel.  Patient reports he is unsure what originated the wound to his left heel.  Patient states the wound to his perirectal tissue has been present approximately a month.  Nutritional supplement is in place for this patient.     Skin tear to left posterior lower arm.  Wound base presents with moist red tissue.  Periwound tissue is dry and pink.  A small amount of serosanguineous drainage is noted.  Drainage is controlled by cleansing with normal saline and gauze.  Recommending to continue daily dressing changes with silver impregnated Hydrofiber and silicone border dressing securement.    Suspected traumatic injury to left anterior thigh.  Wound base is primarily covered with dry yellow slough, with dry red and tan tissue also noted.  Periwound tissue is dry and pink.  Cleansed with normal saline and gauze, blotted dry.  Recommending daily dressing changes with silver impregnated Hydrofiber, moistened with 2 drops normal saline, and silicone border dressing securement.     Unstageable pressure injury to left heel.  Wound base is primarily covered with dry, black eschar, which appears to be lifting at edges.  Edges of wound base present with moist red/yellow tissue visible beneath eschar.  Recommend to continue current daily dressing changes with wound base being covered with Betadine moist gauze, covered with ABD pad, and gauze roll securement.  Keep heel floated at all times with use of heel offloading boot or cushion.    Left plantar forefoot presents with an area of dried tan tissue, with dry blanchable red scar tissue.  Patient reports previous wound at this site.  Cleansed with normal saline and gauze, blotted dry.  Recommending continue current daily wound treatment with area being painted with  Betadine.     Stage III pressure injury noted to the perianal region post incision and drainage.  Wound base presents with moist yellow subcutaneous tissue, yellow/brown slough, red tissue, and an area of dark maroon tissue suspected to be a loosening blood clot. Wound base was copiously irrigated with normal saline and gauze to clear stool from fecal incontinence.  Periwound tissue is dry with pink/redness.  Recommend continue twice a day dressing changes with wound base being lightly filled with one fourth strength Dakin's moistened fluffed gauze and covered with a silicone border dressing.  Dressing changes also to be performed as needed if dressing becomes soiled from fecal incontinence.    Recommending quality skin care and hygiene with application of blue top moisture barrier to right heel, elbows, and gluteal aspects surrounding dressing four times a day, and as needed for incontinence.  Implement every 2 hour turns, and offload at all times.  Keep patient clean, dry, and free from all moisture.     Impression: Surgical incision with delayed closure/Stage III pressure injury to perianal region, skin tear to left lower arm, suspected traumatic injury to left thigh, unstageable pressure injury to left heel, left plantar forefoot with blanchable red scar tissue and dry tan tissue.     Short term goals: Regain skin integrity, skin protection, moisture prevention, pressure reduction, quality skin care and hygiene, twice a day dressing change, daily dressing changes.    Amparo Novoa RN    2/4/2025    18:26 EST

## 2025-02-04 NOTE — PROGRESS NOTES
Pulmonary / Critical Care Progress Note      Patient Name: Preston Wallis  : 1965  MRN: [id number]  Primary Care Physician:  Kimmy Riley MD  Date of admission: 2025    Subjective   Subjective   Follow-up for acute on chronic respiratory failure hypoxic    Over past 24 hours:  Status post bronchoscopy for airway clearance and mucous plugging  Feels better  Continues to have cough with mucopurulent sputum presently no distress    Review of Systems  Complains of shortness of breath with cough and difficulty expectorating  Chest discomfort  Remains on nasal cannula  No fever or hemoptysis      Objective   Objective     Vitals:   Temp:  [97.1 °F (36.2 °C)-98.6 °F (37 °C)] 98.1 °F (36.7 °C)  Heart Rate:  [81-88] 85  Resp:  [16-21] 18  BP: (122-164)/() 159/61  Flow (L/min) (Oxygen Therapy):  [2-3] 3  Physical Exam   Vital Signs Reviewed   General: WDWN, Alert,   Appears chronically ill  HEENT:  PERRL, EOMI.  OP, nares clear, no sinus tenderness  Neck:  Supple, no JVD, no thyromegaly  Chest: Scattered bilateral crackles with rhonchi mainly at the lung bases   CV: RRR, no MGR, pulses 2+, equal.  Abd:  Soft, NT, ND, + BS, no HSM  EXT:  no clubbing, no cyanosis, no edema  Neuro:  A&Ox3, CN grossly intact, no focal deficits.  Skin: No rashes or lesions noted      Result Review    Result Review:  I have personally reviewed the results from the time of this admission to 2025 10:45 EST and agree with these findings:  [x]  Laboratory  [x]  Microbiology  [x]  Radiology  []  EKG/Telemetry   []  Cardiology/Vascular   []  Pathology  []  Old records  []  Other:  Most notable findings include: Bilateral patchy infiltrates    Assessment & Plan   Assessment / Plan     Active Hospital Problems:  Active Hospital Problems    Diagnosis     **PNA (pneumonia)     1. Incision and drainage of posterior perianal abscess 2. Sharp excisional debridement through skin and subcutaneous tissue in the posterior perianal area,  9 x 6 x 1 cm     Perianal abscess     Wound of gluteal cleft     Bacterial pneumonia     Bronchiectasis with acute exacerbation     Pneumonia due to Pseudomonas species     Bronchiectasis without complication     COPD exacerbation          Impression:  Acute on chronic-hypoxic  and hypercapnic respiratory failure  COPD exacerbation severe  Bronchiectasis  Plan:  Status post bronchoscopy with lavage  Suggestive of Pseudomonas on bronc washings  Continue IV Zosyn  Await culture sensitivity  Continue oxygen along with neb treatments  Continue tobramycin nebs  Continue chest physiotherapy and bronchopulmonary hygiene    VTE Prophylaxis:  Pharmacologic & mechanical VTE prophylaxis orders are present.        CODE STATUS:   Code Status (Patient has no pulse and is not breathing): CPR (Attempt to Resuscitate)  Medical Interventions (Patient has pulse or is breathing): Full Support      Electronically signed by Chadd Swan MD, 02/03/25, 11:12 AM EST.    Electronically signed by Chadd Swan MD, 02/03/25, 11:16 AM EST.   Electronically signed by Chadd Swan MD, 02/04/25, 12:38 PM EST.

## 2025-02-04 NOTE — PLAN OF CARE
Goal Outcome Evaluation:           Progress: no change  Outcome Evaluation: Patient VSS, remains on 3L NC. Blood glucose elevated x1. Insulin adminstered appropriately by MAR. Wound care provided by wound care nurse. IV Zosyn infusing via port. Answered questions regarding future colosotomy placement. All needs met at this time. Plan of care ongoing.

## 2025-02-04 NOTE — PLAN OF CARE
Goal Outcome Evaluation:           Progress: no change  Outcome Evaluation: Patient A&O x4. VS stable. No c/o pain or discomfort on shift. Medications administered per MAR. Blood glucose monitored. Wound care done per orders. No significant changes or events to report on shift. All needs met at this time. Plan of care ongoing.

## 2025-02-04 NOTE — PLAN OF CARE
Goal Outcome Evaluation:           Progress: no change  Patient VSS, remains on 3L NC. Blood glucose elevated x2. Insulin adminstered appropriately by MAR. Wound care provided by wound care nurse. IV antibiotics administered. Answered questions regarding future colosotomy placement. All needs met at this time. Plan of care ongoing.

## 2025-02-04 NOTE — PROGRESS NOTES
[facility]   Hospitalist Progress Note  Date: 2025  Patient Name: Preston Wallis  : 1965  MRN: [id number]  Date of admission: 2025  Room/Bed: [facility]/      Subjective   Subjective     Chief Complaint: SOB    Summary:Preston Wallis is a 59 y.o. male past medical history of COPD, hypertension, CHF, history of MDRO presents to the ED due to shortness of breath. Patient was discharged on  for MDRO pseudomonas pneumonia on IV ertapenem.     Patient admitted for shortness of breath.  Chest x-ray shows chronic bilateral lung infiltrates.  CT was performed to arrival of the chest which demonstrates new spiculated left lower lobe nodule, bronchiectasis throughout both lungs, micronodule or peribronchial densities suggestive of atypical infection, right paratracheal lymph node and enlarging pericardial effusion.  Pulmonology and cardiology services consulted.  TTE demonstrates very small pericardial effusion only, estimated EF 49%, with some grade 1 diastolic dysfunction left ventricle.  Wound care noted necrotic tissue perianal region.  CT abdomen pelvis was performed, 4.5 cm x 1.8 cm x 3.5 cm posterior anal abscess noted.  General surgery consulted, patient underwent incision and drainage .  S/p bronchoscopy 2025, BAL grew MDRO Pseudomonas, he completed 7 days of IV meropenem and discontinued on MOIZ nebs.  Wound culture positive for Enterococcus VRE and MDRO Citrobacter, currently receiving IV linezolid and IV Zosyn.  General surgery following and planning diverting colostomy for wound healing.    Interval Followup: No acute overnight events.  No acute distress.  Patient resting comfortably in bed, receiving his chest vest and nebulizer therapy while was in the room.  Family at bedside.  Discussed labs and care plan.  Reports that his surgery has been rescheduled for Thursday.  Answered all questions and concerns.    Objective   Objective     Vitals:   Temp:  [97.1 °F (36.2 °C)-98.6 °F (37  °C)] 98.1 °F (36.7 °C)  Heart Rate:  [81-88] 82  Resp:  [16-21] 16  BP: (122-164)/() 159/61  Flow (L/min) (Oxygen Therapy):  [2-3] 3    Physical Exam   Gen: NAD, Alert and Oriented  Pulm: Nasal cannula in place, no respiratory distress  Abd: soft, nondistended  Extremities: no pitting edema    Result Review    Result Review:  I have personally reviewed these results:  [x]  Laboratory      Lab 02/04/25  0513 02/03/25  0544 02/02/25  0605   WBC 10.10 13.60* 10.75   HEMOGLOBIN 8.0* 8.6* 6.7*   HEMATOCRIT 25.8* 27.3* 22.0*   PLATELETS 228 251 251   NEUTROS ABS 6.60 9.61* 7.29*   IMMATURE GRANS (ABS) 0.06* 0.07* 0.10*   LYMPHS ABS 1.57 1.84 1.67   MONOS ABS 1.34* 1.49* 1.12*   EOS ABS 0.52* 0.58* 0.56*   MCV 89.3 88.1 90.9         Lab 02/04/25  0513 02/03/25  0544 02/02/25  0605 02/01/25  0600 01/31/25  0609   SODIUM 143 140 140   < > 141   POTASSIUM 3.9 3.9 3.7   < > 4.1   CHLORIDE 98 95* 95*   < > 98   CO2 38.1* 38.9* 41.5*   < > 37.0*   ANION GAP 6.9 6.1 3.5*   < > 6.0   BUN 44* 61* 71*   < > 95*   CREATININE 2.06* 2.06* 2.13*   < > 2.28*   EGFR 36.4* 36.4* 35.0*   < > 32.2*   GLUCOSE 92 126* 140*   < > 85   CALCIUM 8.4* 8.6 8.7   < > 8.4*   MAGNESIUM  --   --   --   --  1.8   PHOSPHORUS  --   --   --   --  3.3    < > = values in this interval not displayed.                           Lab 01/31/25  0859 01/31/25  0609   IRON  --  31*   IRON SATURATION (TSAT)  --  17*   TIBC  --  185*   TRANSFERRIN  --  124*   FERRITIN  --  608.60*   ABO TYPING O  --    RH TYPING Negative  --    ANTIBODY SCREEN Negative  --            Brief Urine Lab Results  (Last result in the past 365 days)        Color   Clarity   Blood   Leuk Est   Nitrite   Protein   CREAT   Urine HCG        01/23/25 1912 Yellow   Turbid   Large (3+)   Moderate (2+)   Negative   >=300 mg/dL (3+)                 [x]  Microbiology   Microbiology Results (last 10 days)       Procedure Component Value - Date/Time    Respiratory Culture - Wash, Bronchus  [[id number]] Collected: 02/03/25 1722    Lab Status: Preliminary result Specimen: Wash from Bronchus Updated: 02/03/25 1942     Gram Stain Few (2+) WBCs seen      No organisms seen    Fungus Culture - Lavage, Lung, Right Upper Lobe [[id number]] Collected: 01/28/25 0847    Lab Status: Preliminary result Specimen: Lavage from Lung, Right Upper Lobe Updated: 02/02/25 0916     Fungus Culture No fungus isolated at less than 1 week    AFB Culture - Lavage, Lung, Right Upper Lobe [[id number]] Collected: 01/28/25 0847    Lab Status: Preliminary result Specimen: Lavage from Lung, Right Upper Lobe Updated: 02/02/25 0916     AFB Culture No AFB isolated at less than 1 week     AFB Stain No acid fast bacilli seen on direct smear      No acid fast bacilli seen on concentrated smear    BAL Culture, Quantitative - Lavage, Lung, Right Upper Lobe [[id number]]  (Abnormal) Collected: 01/28/25 0847    Lab Status: Final result Specimen: Lavage from Lung, Right Upper Lobe Updated: 02/03/25 0849     BAL Culture >100,000 CFU/mL Pseudomonas aeruginosa      No Normal Respiratory Nola     Gram Stain Moderate (3+) WBCs seen      Rare (1+) Gram positive cocci in clusters    Narrative:        Refer to previous respiratory culture collected on 01/24/2025 1618 for MICs.      Pneumonia Panel - Lavage, Lung, Right Upper Lobe [[id number]]  (Abnormal) Collected: 01/28/25 0847    Lab Status: Final result Specimen: Lavage from Lung, Right Upper Lobe Updated: 01/28/25 1120     Escherichia coli PCR Not Detected     Acinetobacter calcoaceticus-baumannii complex PCR Not Detected     Enterobacter cloacae PCR Not Detected     Klebsiella oxytoca PCR Not Detected     Klebsiella pneumoniae group PCR Not Detected     Klebsiella aerogenes PCR Not Detected     Moraxella catarrhalis PCR Not Detected     Proteus species PCR Not Detected     Pseudomonas aeroginosa PCR Detected     Comment: >=10^7 Bin copies/mL        Serratia marcescens PCR Not Detected     Staphylococcus  aureus PCR Not Detected     Streptococcus pyogenes PCR Not Detected     Haemophilus influenzae PCR Not Detected     Streptococcus agalactiae PCR Not Detected     Streptococcus pneumoniae PCR Not Detected     Chlamydophila pneumoniae PCR Not Detected     Legionella pneumophilia PCR Not Detected     Mycoplasma pneumo by PCR Not Detected     ADENOVIRUS, PCR Not Detected     CTX-M Gene Not Detected     IMP Gene Not Detected     KPC Gene Not Detected     mecA/C and MREJ Gene N/A     NDM Gene Not Detected     OXA-48-like Gene N/A     VIM Gene Not Detected     Coronavirus Not Detected     Human Metapneumovirus Not Detected     Human Rhinovirus/Enterovirus Not Detected     Influenza A PCR Not Detected     Influenza B PCR Not Detected     RSV, PCR Not Detected     Parainfluenza virus PCR Not Detected    Anaerobic Culture - Surgical Site, Perirectal Abscess [[id number]]  (Abnormal) Collected: 01/26/25 1629    Lab Status: Final result Specimen: Surgical Site from Perirectal Abscess Updated: 02/01/25 0802     Anaerobic Culture Mixed Anaerobic Organisms    Fungus Culture - Surgical Site, Perirectal Abscess [[id number]] Collected: 01/26/25 1629    Lab Status: Preliminary result Specimen: Surgical Site from Perirectal Abscess Updated: 02/02/25 1816     Fungus Culture No fungus isolated at 1 week    Wound Culture - Surgical Site, Perirectal Abscess [[id number]]  (Abnormal)  (Susceptibility) Collected: 01/26/25 1629    Lab Status: Final result Specimen: Surgical Site from Perirectal Abscess Updated: 01/30/25 0938     Wound Culture Light growth (2+) Enterococcus faecium, VRE     Comment:   Vancomycin Resistant Enterococcus species. Patient may be an isolation risk.         Scant growth (1+) Citrobacter amalonaticus     Gram Stain Few (2+) WBCs seen      Rare (1+) Gram positive cocci in pairs and chains    Susceptibility        Enterococcus faecium, VRE      MARIO      Ampicillin Resistant      Linezolid Susceptible      Vancomycin  Resistant                       Susceptibility        Citrobacter amalonaticus      MARIO      Amikacin Susceptible      Amoxicillin + Clavulanate Resistant      Cefazolin (Non Urine) Resistant      Cefepime Resistant      Ceftazidime Resistant      Ceftriaxone Resistant      Ciprofloxacin Resistant      Gentamicin Resistant      Levofloxacin Intermediate      Piperacillin + Tazobactam Susceptible      Tetracycline Resistant      Tobramycin Resistant      Trimethoprim + Sulfamethoxazole Resistant                       Susceptibility Comments       Citrobacter amalonaticus    With the exception of urinary-sourced infections, aminoglycosides should not be used as monotherapy.               AFB Culture - Surgical Site, Perirectal Abscess [[id number]] Collected: 01/26/25 1629    Lab Status: Preliminary result Specimen: Surgical Site from Perirectal Abscess Updated: 02/02/25 1817     AFB Culture No AFB isolated at 1 week     AFB Stain No acid fast bacilli seen          [x]  Radiology  No radiology results for the last 7 days  []  EKG/Telemetry   []  Cardiology/Vascular   []  Pathology  []  Old records  []  Other:    Assessment & Plan   Assessment / Plan     Assessment:  Acute on chronic hypoxic respiratory failure  MDRO Pseudomonas pneumonia  Cystic bronchiectasis with acute exacerbation  Complicated UTI  Type 2 diabetes with hyperglycemia  CKD stage IIIa  COPD, acute exacerbation  Atrial fibrillation on Eliquis  Elevated troponin secondary to demand ischemia  HFpEF, not in acute exacerbation  History of BPH  Chronic left heel ulcer  Obstructive sleep apnea  Perianal abscess s/p I&D 2/2 Enterococcus VRE and MDRO Citrobacter    Plan:  Continue inpatient admission  Pulmonology following.  S/p bronchoscopy 1/28/2025 and 2/3/2025. BAL PCR positive for Pseudomonas.  Culture Pseudomonas.  Completed 7 days IV meropenem.  Continue MOIZ nebs   Wound culture grew Enterococcus VRE and MDRO Citrobacter.  Continue IV linezolid 600 mg  twice daily and IV Zosyn.  Hold trazodone and duloxetine.  Continue BuSpar.  Continue Brovana, Pulmicort, Yupelri  Glucose still low.  Hold Lantus tonight.  DC scheduled Humalog with meals.  Continue medium dose sliding scale.    Creatinine improving  General Surgery following.  S/p I&D perianal abscess 1/26/2025.  Diverting colostomy scheduled for Thursday  Continue scheduled bowel regiment  Cardiology following.  Eliquis currently on hold for procedure.  Continue chronic indwelling Doyle catheter  Hemoglobin improved to 8.0, s/p 2 units PRBCs.  No active bleeding noted, no melena or hematochezia.  Iron panel is low but elevated ferritin  A.m. labs       Discussed with RN.    VTE Prophylaxis:  Pharmacologic & mechanical VTE prophylaxis orders are present.        CODE STATUS:   Code Status (Patient has no pulse and is not breathing): CPR (Attempt to Resuscitate)  Medical Interventions (Patient has pulse or is breathing): Full Support      Electronically signed by Yolanda Cuevas DO, 2/4/2025, 08:22 EST.

## 2025-02-04 NOTE — ANESTHESIA POSTPROCEDURE EVALUATION
Patient: Preston Wallis    Procedure Summary       Date: 02/03/25 Room / Location: [facility] OR 01 / [facility] MAIN OR    Anesthesia Start: 1702 Anesthesia Stop: 1739    Procedure: BRONCHOSCOPY WITH BRONCHOALVEOLAR LAVAGE, POSSIBLE BIOPSY, BRUSHING, WASHING, AIRWAY INSPECTION: insertion of lighted instrument to view inside the lung (Bronchus) Diagnosis:       Bacterial pneumonia      Bronchiectasis with acute exacerbation      (Bacterial pneumonia [J15.9])      (Bronchiectasis with acute exacerbation [J47.1])    Surgeons: Chadd Swan MD Provider: Fransico Grande MD    Anesthesia Type: general ASA Status: 4            Anesthesia Type: general    Vitals  Vitals Value Taken Time   /78 02/03/25 1817   Temp 36.3 °C (97.4 °F) 02/03/25 1805   Pulse 85 02/03/25 1819   Resp 20 02/03/25 1805   SpO2 96 % 02/03/25 1819   Vitals shown include unfiled device data.        Post Anesthesia Care and Evaluation    Patient location during evaluation: bedside  Patient participation: complete - patient participated  Level of consciousness: awake  Pain management: adequate    Airway patency: patent  PONV Status: none  Cardiovascular status: acceptable and stable  Respiratory status: acceptable  Hydration status: acceptable

## 2025-02-05 ENCOUNTER — APPOINTMENT (OUTPATIENT)
Dept: GENERAL RADIOLOGY | Facility: HOSPITAL | Age: 60
End: 2025-02-05
Payer: COMMERCIAL

## 2025-02-05 LAB
ANION GAP SERPL CALCULATED.3IONS-SCNC: 5.3 MMOL/L (ref 5–15)
BASOPHILS # BLD AUTO: 0.03 10*3/MM3 (ref 0–0.2)
BASOPHILS NFR BLD AUTO: 0.3 % (ref 0–1.5)
BUN SERPL-MCNC: 43 MG/DL (ref 6–20)
BUN/CREAT SERPL: 20.6 (ref 7–25)
CALCIUM SPEC-SCNC: 8.7 MG/DL (ref 8.6–10.5)
CHLORIDE SERPL-SCNC: 94 MMOL/L (ref 98–107)
CO2 SERPL-SCNC: 36.7 MMOL/L (ref 22–29)
CREAT SERPL-MCNC: 2.09 MG/DL (ref 0.76–1.27)
DEPRECATED RDW RBC AUTO: 45 FL (ref 37–54)
EGFRCR SERPLBLD CKD-EPI 2021: 35.8 ML/MIN/1.73
EOSINOPHIL # BLD AUTO: 0.52 10*3/MM3 (ref 0–0.4)
EOSINOPHIL NFR BLD AUTO: 4.8 % (ref 0.3–6.2)
ERYTHROCYTE [DISTWIDTH] IN BLOOD BY AUTOMATED COUNT: 14.3 % (ref 12.3–15.4)
GLUCOSE BLDC GLUCOMTR-MCNC: 172 MG/DL (ref 70–99)
GLUCOSE BLDC GLUCOMTR-MCNC: 190 MG/DL (ref 70–99)
GLUCOSE BLDC GLUCOMTR-MCNC: 194 MG/DL (ref 70–99)
GLUCOSE BLDC GLUCOMTR-MCNC: 201 MG/DL (ref 70–99)
GLUCOSE SERPL-MCNC: 220 MG/DL (ref 65–99)
HCT VFR BLD AUTO: 26.7 % (ref 37.5–51)
HGB BLD-MCNC: 8.3 G/DL (ref 13–17.7)
IMM GRANULOCYTES # BLD AUTO: 0.04 10*3/MM3 (ref 0–0.05)
IMM GRANULOCYTES NFR BLD AUTO: 0.4 % (ref 0–0.5)
LYMPHOCYTES # BLD AUTO: 2.03 10*3/MM3 (ref 0.7–3.1)
LYMPHOCYTES NFR BLD AUTO: 18.7 % (ref 19.6–45.3)
MCH RBC QN AUTO: 27.6 PG (ref 26.6–33)
MCHC RBC AUTO-ENTMCNC: 31.1 G/DL (ref 31.5–35.7)
MCV RBC AUTO: 88.7 FL (ref 79–97)
MONOCYTES # BLD AUTO: 1.5 10*3/MM3 (ref 0.1–0.9)
MONOCYTES NFR BLD AUTO: 13.8 % (ref 5–12)
NEUTROPHILS NFR BLD AUTO: 6.76 10*3/MM3 (ref 1.7–7)
NEUTROPHILS NFR BLD AUTO: 62 % (ref 42.7–76)
NRBC BLD AUTO-RTO: 0 /100 WBC (ref 0–0.2)
P JIROVECII AG SPEC QL IF: NEGATIVE
PLATELET # BLD AUTO: 210 10*3/MM3 (ref 140–450)
PMV BLD AUTO: 8.4 FL (ref 6–12)
POTASSIUM SERPL-SCNC: 4.1 MMOL/L (ref 3.5–5.2)
RBC # BLD AUTO: 3.01 10*6/MM3 (ref 4.14–5.8)
SODIUM SERPL-SCNC: 136 MMOL/L (ref 136–145)
WBC NRBC COR # BLD AUTO: 10.88 10*3/MM3 (ref 3.4–10.8)

## 2025-02-05 PROCEDURE — 71045 X-RAY EXAM CHEST 1 VIEW: CPT

## 2025-02-05 PROCEDURE — 82948 REAGENT STRIP/BLOOD GLUCOSE: CPT

## 2025-02-05 PROCEDURE — 82948 REAGENT STRIP/BLOOD GLUCOSE: CPT | Performed by: INTERNAL MEDICINE

## 2025-02-05 PROCEDURE — 63710000001 INSULIN LISPRO (HUMAN) PER 5 UNITS: Performed by: INTERNAL MEDICINE

## 2025-02-05 PROCEDURE — 99232 SBSQ HOSP IP/OBS MODERATE 35: CPT | Performed by: INTERNAL MEDICINE

## 2025-02-05 PROCEDURE — 25010000002 ENOXAPARIN PER 10 MG: Performed by: INTERNAL MEDICINE

## 2025-02-05 PROCEDURE — 99232 SBSQ HOSP IP/OBS MODERATE 35: CPT | Performed by: STUDENT IN AN ORGANIZED HEALTH CARE EDUCATION/TRAINING PROGRAM

## 2025-02-05 PROCEDURE — 85025 COMPLETE CBC W/AUTO DIFF WBC: CPT | Performed by: STUDENT IN AN ORGANIZED HEALTH CARE EDUCATION/TRAINING PROGRAM

## 2025-02-05 PROCEDURE — 63710000001 REVEFENACIN 175 MCG/3ML SOLUTION: Performed by: INTERNAL MEDICINE

## 2025-02-05 PROCEDURE — 94761 N-INVAS EAR/PLS OXIMETRY MLT: CPT

## 2025-02-05 PROCEDURE — 94669 MECHANICAL CHEST WALL OSCILL: CPT

## 2025-02-05 PROCEDURE — 94664 DEMO&/EVAL PT USE INHALER: CPT

## 2025-02-05 PROCEDURE — 80048 BASIC METABOLIC PNL TOTAL CA: CPT | Performed by: INTERNAL MEDICINE

## 2025-02-05 PROCEDURE — 94799 UNLISTED PULMONARY SVC/PX: CPT

## 2025-02-05 PROCEDURE — 25010000002 PIPERACILLIN SOD-TAZOBACTAM PER 1 G: Performed by: STUDENT IN AN ORGANIZED HEALTH CARE EDUCATION/TRAINING PROGRAM

## 2025-02-05 RX ADMIN — ARFORMOTEROL TARTRATE 15 MCG: 15 SOLUTION RESPIRATORY (INHALATION) at 10:24

## 2025-02-05 RX ADMIN — INSULIN LISPRO 4 UNITS: 100 INJECTION, SOLUTION INTRAVENOUS; SUBCUTANEOUS at 21:23

## 2025-02-05 RX ADMIN — INSULIN LISPRO 2 UNITS: 100 INJECTION, SOLUTION INTRAVENOUS; SUBCUTANEOUS at 12:35

## 2025-02-05 RX ADMIN — BUDESONIDE 0.5 MG: 0.5 INHALANT RESPIRATORY (INHALATION) at 18:13

## 2025-02-05 RX ADMIN — SENNOSIDES AND DOCUSATE SODIUM 2 TABLET: 50; 8.6 TABLET ORAL at 21:24

## 2025-02-05 RX ADMIN — BUSPIRONE HYDROCHLORIDE 15 MG: 5 TABLET ORAL at 09:36

## 2025-02-05 RX ADMIN — GUAIFENESIN 600 MG: 600 TABLET ORAL at 09:36

## 2025-02-05 RX ADMIN — FAMOTIDINE 40 MG: 20 TABLET ORAL at 06:01

## 2025-02-05 RX ADMIN — FINASTERIDE 5 MG: 5 TABLET, FILM COATED ORAL at 09:36

## 2025-02-05 RX ADMIN — TOBRAMYCIN 300 MG: 300 SOLUTION RESPIRATORY (INHALATION) at 18:13

## 2025-02-05 RX ADMIN — ATORVASTATIN CALCIUM 20 MG: 10 TABLET, FILM COATED ORAL at 09:36

## 2025-02-05 RX ADMIN — INSULIN LISPRO 2 UNITS: 100 INJECTION, SOLUTION INTRAVENOUS; SUBCUTANEOUS at 09:36

## 2025-02-05 RX ADMIN — SODIUM HYPOCHLORITE: 1.25 SOLUTION TOPICAL at 14:45

## 2025-02-05 RX ADMIN — Medication 10 ML: at 14:45

## 2025-02-05 RX ADMIN — Medication 10 ML: at 21:24

## 2025-02-05 RX ADMIN — BUSPIRONE HYDROCHLORIDE 15 MG: 5 TABLET ORAL at 21:24

## 2025-02-05 RX ADMIN — CARVEDILOL 25 MG: 25 TABLET, FILM COATED ORAL at 21:24

## 2025-02-05 RX ADMIN — REVEFENACIN 175 MCG: 175 SOLUTION RESPIRATORY (INHALATION) at 10:24

## 2025-02-05 RX ADMIN — TAMSULOSIN HYDROCHLORIDE 0.4 MG: 0.4 CAPSULE ORAL at 09:36

## 2025-02-05 RX ADMIN — PIPERACILLIN AND TAZOBACTAM 3.38 G: 3; .375 INJECTION, POWDER, FOR SOLUTION INTRAVENOUS at 06:01

## 2025-02-05 RX ADMIN — ARFORMOTEROL TARTRATE 15 MCG: 15 SOLUTION RESPIRATORY (INHALATION) at 18:13

## 2025-02-05 RX ADMIN — BUMETANIDE 2 MG: 1 TABLET ORAL at 09:36

## 2025-02-05 RX ADMIN — BUMETANIDE 2 MG: 1 TABLET ORAL at 21:24

## 2025-02-05 RX ADMIN — ASPIRIN 81 MG: 81 TABLET, COATED ORAL at 06:01

## 2025-02-05 RX ADMIN — INSULIN LISPRO 2 UNITS: 100 INJECTION, SOLUTION INTRAVENOUS; SUBCUTANEOUS at 17:55

## 2025-02-05 RX ADMIN — PIPERACILLIN AND TAZOBACTAM 3.38 G: 3; .375 INJECTION, POWDER, FOR SOLUTION INTRAVENOUS at 21:25

## 2025-02-05 RX ADMIN — NIFEDIPINE 30 MG: 30 TABLET, FILM COATED, EXTENDED RELEASE ORAL at 06:01

## 2025-02-05 RX ADMIN — TOBRAMYCIN 300 MG: 300 SOLUTION RESPIRATORY (INHALATION) at 10:23

## 2025-02-05 RX ADMIN — BUDESONIDE 0.5 MG: 0.5 INHALANT RESPIRATORY (INHALATION) at 10:23

## 2025-02-05 RX ADMIN — PIPERACILLIN AND TAZOBACTAM 3.38 G: 3; .375 INJECTION, POWDER, FOR SOLUTION INTRAVENOUS at 12:36

## 2025-02-05 RX ADMIN — CARVEDILOL 25 MG: 25 TABLET, FILM COATED ORAL at 09:36

## 2025-02-05 RX ADMIN — FERROUS SULFATE TAB 325 MG (65 MG ELEMENTAL FE) 325 MG: 325 (65 FE) TAB at 09:36

## 2025-02-05 RX ADMIN — ENOXAPARIN SODIUM 40 MG: 100 INJECTION SUBCUTANEOUS at 21:24

## 2025-02-05 RX ADMIN — SODIUM HYPOCHLORITE: 1.25 SOLUTION TOPICAL at 21:25

## 2025-02-05 RX ADMIN — MONTELUKAST 10 MG: 10 TABLET, FILM COATED ORAL at 21:24

## 2025-02-05 RX ADMIN — Medication 10 ML: at 09:37

## 2025-02-05 RX ADMIN — GUAIFENESIN 600 MG: 600 TABLET ORAL at 21:24

## 2025-02-05 NOTE — PLAN OF CARE
Goal Outcome Evaluation:  Plan of Care Reviewed With: patient        Progress: no change  Outcome Evaluation: A/o x4. VSS. Blood glucose monitored. Insulin administered per MAR. Wound care provided. IV zosyn infusing. No additional needs at this time. Plan of care ongoing.

## 2025-02-05 NOTE — PROGRESS NOTES
Pulmonary / Critical Care Progress Note      Patient Name: Preston Wallis  : 1965  MRN: [id number]  Primary Care Physician:  Kimmy Riley MD  Date of admission: 2025    Subjective   Subjective   Follow-up for acute on chronic respiratory failure hypoxic    Over past 24 hours:  Status post bronchoscopy for airway clearance and mucous plugging  Feels better appears comfortable  Continues to have cough with mucopurulent sputum presently no distress    Review of Systems  Complains of shortness of breath with cough and difficulty expectorating  Chest discomfort  Remains on nasal cannula  No fever or hemoptysis      Objective   Objective     Vitals:   Temp:  [97.8 °F (36.6 °C)-99 °F (37.2 °C)] 97.8 °F (36.6 °C)  Heart Rate:  [83-91] 83  Resp:  [18-22] 18  BP: (153-173)/(49-69) 153/56  Flow (L/min) (Oxygen Therapy):  [2-3] 2  Physical Exam   Vital Signs Reviewed   General: WDWN, Alert,   Appears chronically ill  HEENT:  PERRL, EOMI.  OP, nares clear, no sinus tenderness  Neck:  Supple, no JVD, no thyromegaly  Chest: Scattered bilateral crackles with rhonchi mainly at the lung bases   CV: RRR, no MGR, pulses 2+, equal.  Abd:  Soft, NT, ND, + BS, no HSM  EXT:  no clubbing, no cyanosis, no edema  Neuro:  A&Ox3, CN grossly intact, no focal deficits.  Skin: No rashes or lesions noted      Result Review    Result Review:  I have personally reviewed the results from the time of this admission to 2025 08:13 EST and agree with these findings:  [x]  Laboratory  [x]  Microbiology  [x]  Radiology  []  EKG/Telemetry   []  Cardiology/Vascular   []  Pathology  []  Old records  []  Other:  Most notable findings include: Bilateral patchy infiltrates    Assessment & Plan   Assessment / Plan     Active Hospital Problems:  Active Hospital Problems    Diagnosis     **PNA (pneumonia)     1. Incision and drainage of posterior perianal abscess 2. Sharp excisional debridement through skin and subcutaneous tissue in the  posterior perianal area, 9 x 6 x 1 cm     Perianal abscess     Wound of gluteal cleft     Bacterial pneumonia     Bronchiectasis with acute exacerbation     Pneumonia due to Pseudomonas species     Bronchiectasis without complication     COPD exacerbation          Impression:  Acute on chronic-hypoxic  and hypercapnic respiratory failure  COPD exacerbation severe  Bronchiectasis  Plan:  Status post bronchoscopy with lavage  Suggestive of Pseudomonas on bronc washings  Continue IV Zosyn  Await culture sensitivity no AFB or fungus seen  Continue oxygen along with neb treatments  Continue tobramycin nebs  Continue chest physiotherapy and bronchopulmonary hygiene    VTE Prophylaxis:  Pharmacologic & mechanical VTE prophylaxis orders are present.        CODE STATUS:   Code Status (Patient has no pulse and is not breathing): CPR (Attempt to Resuscitate)  Medical Interventions (Patient has pulse or is breathing): Full Support      Electronically signed by Chadd Swan MD, 02/03/25, 11:12 AM EST.    Electronically signed by Chadd Swan MD, 02/03/25, 11:16 AM EST.   Electronically signed by Chadd Swan MD, 02/04/25, 12:38 PM EST.    Electronically signed by Chadd Swan MD, 02/05/25, 11:04 AM EST.

## 2025-02-05 NOTE — PLAN OF CARE
Goal Outcome Evaluation:  Plan of Care Reviewed With: patient        Progress: no change  Outcome Evaluation: No complaints from patien today. He has slept most of the shift. SSI given per MAR. Wound care provided on Lt arm, Lt foot and coccyx. Patient is to be NPO at midnight for surgery tomorrow.

## 2025-02-05 NOTE — PROGRESS NOTES
[facility]   Hospitalist Progress Note  Date: 2025  Patient Name: Preston Wallis  : 1965  MRN: [id number]  Date of admission: 2025  Room/Bed: [facility]/      Subjective   Subjective     Chief Complaint: SOB    Summary:Preston Wallis is a 59 y.o. male past medical history of COPD, hypertension, CHF, history of MDRO presents to the ED due to shortness of breath. Patient was discharged on  for MDRO pseudomonas pneumonia on IV ertapenem.     Patient admitted for shortness of breath.  Chest x-ray shows chronic bilateral lung infiltrates.  CT was performed to arrival of the chest which demonstrates new spiculated left lower lobe nodule, bronchiectasis throughout both lungs, micronodule or peribronchial densities suggestive of atypical infection, right paratracheal lymph node and enlarging pericardial effusion.  Pulmonology and cardiology services consulted.  TTE demonstrates very small pericardial effusion only, estimated EF 49%, with some grade 1 diastolic dysfunction left ventricle.  Wound care noted necrotic tissue perianal region.  CT abdomen pelvis was performed, 4.5 cm x 1.8 cm x 3.5 cm posterior anal abscess noted.  General surgery consulted, patient underwent incision and drainage .  S/p bronchoscopy 2025, BAL grew MDRO Pseudomonas, he completed 7 days of IV meropenem and discontinued on MOIZ nebs.  Wound culture positive for Enterococcus VRE and MDRO Citrobacter, currently receiving IV linezolid and IV Zosyn.  General surgery following and planning diverting colostomy for wound healing.  Plan for diverting colostomy procedure tomorrow.    Interval Followup: No acute overnight events.  No acute distress.  Patient resting comfortably in bed, no active complaints.  Discussed labs and care plan.  Aware that he has procedure scheduled for tomorrow.  Answered all questions and concerns.    Objective   Objective     Vitals:   Temp:  [97.8 °F (36.6 °C)-99 °F (37.2 °C)] 97.8 °F (36.6  °C)  Heart Rate:  [83-91] 83  Resp:  [18-22] 18  BP: (153-173)/(49-69) 153/56  Flow (L/min) (Oxygen Therapy):  [2-3] 2    Physical Exam   Gen: NAD, Alert and Oriented  Pulm: Nasal cannula in place, no respiratory distress  Abd: soft, nondistended  Extremities: no pitting edema    Result Review    Result Review:  I have personally reviewed these results:  [x]  Laboratory      Lab 02/05/25 0612 02/04/25  0513 02/03/25  0544   WBC 10.88* 10.10 13.60*   HEMOGLOBIN 8.3* 8.0* 8.6*   HEMATOCRIT 26.7* 25.8* 27.3*   PLATELETS 210 228 251   NEUTROS ABS 6.76 6.60 9.61*   IMMATURE GRANS (ABS) 0.04 0.06* 0.07*   LYMPHS ABS 2.03 1.57 1.84   MONOS ABS 1.50* 1.34* 1.49*   EOS ABS 0.52* 0.52* 0.58*   MCV 88.7 89.3 88.1         Lab 02/05/25 0612 02/04/25  0513 02/03/25  0544 02/01/25  0600 01/31/25  0609   SODIUM 136 143 140   < > 141   POTASSIUM 4.1 3.9 3.9   < > 4.1   CHLORIDE 94* 98 95*   < > 98   CO2 36.7* 38.1* 38.9*   < > 37.0*   ANION GAP 5.3 6.9 6.1   < > 6.0   BUN 43* 44* 61*   < > 95*   CREATININE 2.09* 2.06* 2.06*   < > 2.28*   EGFR 35.8* 36.4* 36.4*   < > 32.2*   GLUCOSE 220* 92 126*   < > 85   CALCIUM 8.7 8.4* 8.6   < > 8.4*   MAGNESIUM  --   --   --   --  1.8   PHOSPHORUS  --   --   --   --  3.3    < > = values in this interval not displayed.                           Lab 01/31/25  0859 01/31/25  0609   IRON  --  31*   IRON SATURATION (TSAT)  --  17*   TIBC  --  185*   TRANSFERRIN  --  124*   FERRITIN  --  608.60*   ABO TYPING O  --    RH TYPING Negative  --    ANTIBODY SCREEN Negative  --            Brief Urine Lab Results  (Last result in the past 365 days)        Color   Clarity   Blood   Leuk Est   Nitrite   Protein   CREAT   Urine HCG        01/23/25 1912 Yellow   Turbid   Large (3+)   Moderate (2+)   Negative   >=300 mg/dL (3+)                 [x]  Microbiology   Microbiology Results (last 10 days)       Procedure Component Value - Date/Time    AFB Culture - Wash, Bronchus [[id number]] Collected: 02/03/25 8922     Lab Status: Preliminary result Specimen: Wash from Bronchus Updated: 02/04/25 1509     AFB Stain No acid fast bacilli seen on direct smear      No acid fast bacilli seen on concentrated smear    Respiratory Culture - Wash, Bronchus [[id number]] Collected: 02/03/25 1722    Lab Status: Preliminary result Specimen: Wash from Bronchus Updated: 02/04/25 1028     Respiratory Culture No growth     Gram Stain Few (2+) WBCs seen      No organisms seen    Fungus Culture - Lavage, Lung, Right Upper Lobe [[id number]] Collected: 01/28/25 0847    Lab Status: Preliminary result Specimen: Lavage from Lung, Right Upper Lobe Updated: 02/04/25 0915     Fungus Culture No fungus isolated at 1 week    AFB Culture - Lavage, Lung, Right Upper Lobe [[id number]] Collected: 01/28/25 0847    Lab Status: Preliminary result Specimen: Lavage from Lung, Right Upper Lobe Updated: 02/04/25 0915     AFB Culture No AFB isolated at 1 week     AFB Stain No acid fast bacilli seen on direct smear      No acid fast bacilli seen on concentrated smear    BAL Culture, Quantitative - Lavage, Lung, Right Upper Lobe [[id number]]  (Abnormal) Collected: 01/28/25 0847    Lab Status: Final result Specimen: Lavage from Lung, Right Upper Lobe Updated: 02/03/25 0849     BAL Culture >100,000 CFU/mL Pseudomonas aeruginosa      No Normal Respiratory Nola     Gram Stain Moderate (3+) WBCs seen      Rare (1+) Gram positive cocci in clusters    Narrative:        Refer to previous respiratory culture collected on 01/24/2025 1618 for MICs.      Pneumonia Panel - Lavage, Lung, Right Upper Lobe [[id number]]  (Abnormal) Collected: 01/28/25 0847    Lab Status: Final result Specimen: Lavage from Lung, Right Upper Lobe Updated: 01/28/25 1120     Escherichia coli PCR Not Detected     Acinetobacter calcoaceticus-baumannii complex PCR Not Detected     Enterobacter cloacae PCR Not Detected     Klebsiella oxytoca PCR Not Detected     Klebsiella pneumoniae group PCR Not Detected      Klebsiella aerogenes PCR Not Detected     Moraxella catarrhalis PCR Not Detected     Proteus species PCR Not Detected     Pseudomonas aeroginosa PCR Detected     Comment: >=10^7 Bin copies/mL        Serratia marcescens PCR Not Detected     Staphylococcus aureus PCR Not Detected     Streptococcus pyogenes PCR Not Detected     Haemophilus influenzae PCR Not Detected     Streptococcus agalactiae PCR Not Detected     Streptococcus pneumoniae PCR Not Detected     Chlamydophila pneumoniae PCR Not Detected     Legionella pneumophilia PCR Not Detected     Mycoplasma pneumo by PCR Not Detected     ADENOVIRUS, PCR Not Detected     CTX-M Gene Not Detected     IMP Gene Not Detected     KPC Gene Not Detected     mecA/C and MREJ Gene N/A     NDM Gene Not Detected     OXA-48-like Gene N/A     VIM Gene Not Detected     Coronavirus Not Detected     Human Metapneumovirus Not Detected     Human Rhinovirus/Enterovirus Not Detected     Influenza A PCR Not Detected     Influenza B PCR Not Detected     RSV, PCR Not Detected     Parainfluenza virus PCR Not Detected    Anaerobic Culture - Surgical Site, Perirectal Abscess [[id number]]  (Abnormal) Collected: 01/26/25 1629    Lab Status: Final result Specimen: Surgical Site from Perirectal Abscess Updated: 02/01/25 0802     Anaerobic Culture Mixed Anaerobic Organisms    Fungus Culture - Surgical Site, Perirectal Abscess [[id number]] Collected: 01/26/25 1629    Lab Status: Preliminary result Specimen: Surgical Site from Perirectal Abscess Updated: 02/02/25 1816     Fungus Culture No fungus isolated at 1 week    Wound Culture - Surgical Site, Perirectal Abscess [[id number]]  (Abnormal)  (Susceptibility) Collected: 01/26/25 1629    Lab Status: Final result Specimen: Surgical Site from Perirectal Abscess Updated: 01/30/25 0938     Wound Culture Light growth (2+) Enterococcus faecium, VRE     Comment:   Vancomycin Resistant Enterococcus species. Patient may be an isolation risk.         Scant growth  (1+) Citrobacter amalonaticus     Gram Stain Few (2+) WBCs seen      Rare (1+) Gram positive cocci in pairs and chains    Susceptibility        Enterococcus faecium, VRE      MARIO      Ampicillin Resistant      Linezolid Susceptible      Vancomycin Resistant                       Susceptibility        Citrobacter amalonaticus      MARIO      Amikacin Susceptible      Amoxicillin + Clavulanate Resistant      Cefazolin (Non Urine) Resistant      Cefepime Resistant      Ceftazidime Resistant      Ceftriaxone Resistant      Ciprofloxacin Resistant      Gentamicin Resistant      Levofloxacin Intermediate      Piperacillin + Tazobactam Susceptible      Tetracycline Resistant      Tobramycin Resistant      Trimethoprim + Sulfamethoxazole Resistant                       Susceptibility Comments       Citrobacter amalonaticus    With the exception of urinary-sourced infections, aminoglycosides should not be used as monotherapy.               AFB Culture - Surgical Site, Perirectal Abscess [[id number]] Collected: 01/26/25 1629    Lab Status: Preliminary result Specimen: Surgical Site from Perirectal Abscess Updated: 02/02/25 1816     AFB Culture No AFB isolated at 1 week     AFB Stain No acid fast bacilli seen          [x]  Radiology  XR Chest 1 View    Result Date: 2/5/2025  Impression: 1.Bilateral upper lobe airspace opacities corresponding to areas of bronchiectasis seen on the prior examination. 2.Right basilar airspace disease which may represent atelectasis or pneumonia. 3.Trace bilateral pleural effusions. 4.Cardiomegaly. Electronically Signed: Mynor Singleton  2/5/2025 7:47 AM EST  Workstation ID: [id number]   []  EKG/Telemetry   []  Cardiology/Vascular   []  Pathology  []  Old records  []  Other:    Assessment & Plan   Assessment / Plan     Assessment:  Acute on chronic hypoxic respiratory failure  MDRO Pseudomonas pneumonia  Cystic bronchiectasis with acute exacerbation  Complicated UTI  Type 2 diabetes with  hyperglycemia  CKD stage IIIa  COPD, acute exacerbation  Atrial fibrillation on Eliquis  Elevated troponin secondary to demand ischemia  HFpEF, not in acute exacerbation  History of BPH  Chronic left heel ulcer  Obstructive sleep apnea  Perianal abscess s/p I&D 2/2 Enterococcus VRE and MDRO Citrobacter    Plan:  Continue inpatient admission  Pulmonology following.  S/p bronchoscopy 1/28/2025 and 2/3/2025. BAL PCR positive for Pseudomonas.  Culture Pseudomonas.  Completed 7 days IV meropenem.  Continue MOIZ nebs   Wound culture grew Enterococcus VRE and MDRO Citrobacter.  Continue IV linezolid 600 mg twice daily and IV Zosyn.  Hold trazodone and duloxetine.  Continue BuSpar.  Continue Brovana, Pulmicort, Yupelri  Glucose better.  Holding Lantus, continue insulin sliding scale.    Creatinine stable.  General Surgery following.  S/p I&D perianal abscess 1/26/2025.  Diverting colostomy scheduled for tomorrow.  Patient to be placed n.p.o. after midnight for the procedure.    Continue scheduled bowel regiment  Cardiology following.  Eliquis currently on hold for procedure.  Continue chronic indwelling Doyle catheter  Hemoglobin improved to 8.3, s/p 2 units PRBCs.  No active bleeding noted, no melena or hematochezia.  Iron panel is low but elevated ferritin  A.m. labs.  Clinical course to determine disposition.       Discussed with RN.    VTE Prophylaxis:  Pharmacologic & mechanical VTE prophylaxis orders are present.        CODE STATUS:   Code Status (Patient has no pulse and is not breathing): CPR (Attempt to Resuscitate)  Medical Interventions (Patient has pulse or is breathing): Full Support      Electronically signed by Miranda Enriquez MD, 2/5/2025, 09:24 EST.

## 2025-02-06 LAB
ANION GAP SERPL CALCULATED.3IONS-SCNC: 6.4 MMOL/L (ref 5–15)
BASOPHILS # BLD AUTO: 0.04 10*3/MM3 (ref 0–0.2)
BASOPHILS NFR BLD AUTO: 0.3 % (ref 0–1.5)
BUN SERPL-MCNC: 47 MG/DL (ref 6–20)
BUN/CREAT SERPL: 20.6 (ref 7–25)
CALCIUM SPEC-SCNC: 8.8 MG/DL (ref 8.6–10.5)
CHLORIDE SERPL-SCNC: 93 MMOL/L (ref 98–107)
CO2 SERPL-SCNC: 34.6 MMOL/L (ref 22–29)
CREAT SERPL-MCNC: 2.28 MG/DL (ref 0.76–1.27)
CYTO UR: NORMAL
DEPRECATED RDW RBC AUTO: 45.1 FL (ref 37–54)
EGFRCR SERPLBLD CKD-EPI 2021: 32.2 ML/MIN/1.73
EOSINOPHIL # BLD AUTO: 0.54 10*3/MM3 (ref 0–0.4)
EOSINOPHIL NFR BLD AUTO: 4.2 % (ref 0.3–6.2)
ERYTHROCYTE [DISTWIDTH] IN BLOOD BY AUTOMATED COUNT: 14.3 % (ref 12.3–15.4)
GLUCOSE BLDC GLUCOMTR-MCNC: 150 MG/DL (ref 70–99)
GLUCOSE BLDC GLUCOMTR-MCNC: 174 MG/DL (ref 70–99)
GLUCOSE BLDC GLUCOMTR-MCNC: 176 MG/DL (ref 70–99)
GLUCOSE BLDC GLUCOMTR-MCNC: 261 MG/DL (ref 70–99)
GLUCOSE BLDC GLUCOMTR-MCNC: 303 MG/DL (ref 70–99)
GLUCOSE SERPL-MCNC: 168 MG/DL (ref 65–99)
HCT VFR BLD AUTO: 25.8 % (ref 37.5–51)
HGB BLD-MCNC: 8.2 G/DL (ref 13–17.7)
IMM GRANULOCYTES # BLD AUTO: 0.07 10*3/MM3 (ref 0–0.05)
IMM GRANULOCYTES NFR BLD AUTO: 0.5 % (ref 0–0.5)
LAB AP CASE REPORT: NORMAL
LAB AP CLINICAL INFORMATION: NORMAL
LAB AP SPECIAL STAINS: NORMAL
LYMPHOCYTES # BLD AUTO: 2.1 10*3/MM3 (ref 0.7–3.1)
LYMPHOCYTES NFR BLD AUTO: 16.3 % (ref 19.6–45.3)
MAGNESIUM SERPL-MCNC: 1.5 MG/DL (ref 1.6–2.6)
MCH RBC QN AUTO: 28 PG (ref 26.6–33)
MCHC RBC AUTO-ENTMCNC: 31.8 G/DL (ref 31.5–35.7)
MCV RBC AUTO: 88.1 FL (ref 79–97)
MONOCYTES # BLD AUTO: 1.58 10*3/MM3 (ref 0.1–0.9)
MONOCYTES NFR BLD AUTO: 12.2 % (ref 5–12)
NEUTROPHILS NFR BLD AUTO: 66.5 % (ref 42.7–76)
NEUTROPHILS NFR BLD AUTO: 8.59 10*3/MM3 (ref 1.7–7)
NRBC BLD AUTO-RTO: 0 /100 WBC (ref 0–0.2)
PATH REPORT.FINAL DX SPEC: NORMAL
PATH REPORT.GROSS SPEC: NORMAL
PHOSPHATE SERPL-MCNC: 2.7 MG/DL (ref 2.5–4.5)
PLATELET # BLD AUTO: 212 10*3/MM3 (ref 140–450)
PMV BLD AUTO: 8.7 FL (ref 6–12)
POTASSIUM SERPL-SCNC: 4.4 MMOL/L (ref 3.5–5.2)
RBC # BLD AUTO: 2.93 10*6/MM3 (ref 4.14–5.8)
SODIUM SERPL-SCNC: 134 MMOL/L (ref 136–145)
WBC NRBC COR # BLD AUTO: 12.92 10*3/MM3 (ref 3.4–10.8)

## 2025-02-06 PROCEDURE — 25810000003 SODIUM CHLORIDE 0.9 % SOLUTION

## 2025-02-06 PROCEDURE — 44188 LAP COLOSTOMY: CPT | Performed by: STUDENT IN AN ORGANIZED HEALTH CARE EDUCATION/TRAINING PROGRAM

## 2025-02-06 PROCEDURE — 25010000002 SUGAMMADEX 200 MG/2ML SOLUTION

## 2025-02-06 PROCEDURE — 25010000002 DEXAMETHASONE PER 1 MG

## 2025-02-06 PROCEDURE — 63710000001 REVEFENACIN 175 MCG/3ML SOLUTION: Performed by: INTERNAL MEDICINE

## 2025-02-06 PROCEDURE — 25010000002 MIDAZOLAM PER 1MG: Performed by: ANESTHESIOLOGY

## 2025-02-06 PROCEDURE — 99232 SBSQ HOSP IP/OBS MODERATE 35: CPT | Performed by: STUDENT IN AN ORGANIZED HEALTH CARE EDUCATION/TRAINING PROGRAM

## 2025-02-06 PROCEDURE — 0D1N4Z4 BYPASS SIGMOID COLON TO CUTANEOUS, PERCUTANEOUS ENDOSCOPIC APPROACH: ICD-10-PCS | Performed by: STUDENT IN AN ORGANIZED HEALTH CARE EDUCATION/TRAINING PROGRAM

## 2025-02-06 PROCEDURE — 63710000001 INSULIN LISPRO (HUMAN) PER 5 UNITS: Performed by: INTERNAL MEDICINE

## 2025-02-06 PROCEDURE — 80048 BASIC METABOLIC PNL TOTAL CA: CPT | Performed by: STUDENT IN AN ORGANIZED HEALTH CARE EDUCATION/TRAINING PROGRAM

## 2025-02-06 PROCEDURE — 25010000002 ONDANSETRON PER 1 MG

## 2025-02-06 PROCEDURE — 82948 REAGENT STRIP/BLOOD GLUCOSE: CPT

## 2025-02-06 PROCEDURE — 25010000002 FENTANYL CITRATE (PF) 50 MCG/ML SOLUTION

## 2025-02-06 PROCEDURE — 63710000001 INSULIN LISPRO (HUMAN) PER 5 UNITS: Performed by: STUDENT IN AN ORGANIZED HEALTH CARE EDUCATION/TRAINING PROGRAM

## 2025-02-06 PROCEDURE — 0WQF0ZZ REPAIR ABDOMINAL WALL, OPEN APPROACH: ICD-10-PCS | Performed by: STUDENT IN AN ORGANIZED HEALTH CARE EDUCATION/TRAINING PROGRAM

## 2025-02-06 PROCEDURE — 94760 N-INVAS EAR/PLS OXIMETRY 1: CPT

## 2025-02-06 PROCEDURE — 49594 RPR AA HRN 1ST 3-10 NCR/STRN: CPT | Performed by: STUDENT IN AN ORGANIZED HEALTH CARE EDUCATION/TRAINING PROGRAM

## 2025-02-06 PROCEDURE — 94664 DEMO&/EVAL PT USE INHALER: CPT

## 2025-02-06 PROCEDURE — 85025 COMPLETE CBC W/AUTO DIFF WBC: CPT | Performed by: STUDENT IN AN ORGANIZED HEALTH CARE EDUCATION/TRAINING PROGRAM

## 2025-02-06 PROCEDURE — 94799 UNLISTED PULMONARY SVC/PX: CPT

## 2025-02-06 PROCEDURE — 84100 ASSAY OF PHOSPHORUS: CPT | Performed by: STUDENT IN AN ORGANIZED HEALTH CARE EDUCATION/TRAINING PROGRAM

## 2025-02-06 PROCEDURE — 25010000002 METOCLOPRAMIDE PER 10 MG: Performed by: ANESTHESIOLOGY

## 2025-02-06 PROCEDURE — 25010000002 PROPOFOL 10 MG/ML EMULSION

## 2025-02-06 PROCEDURE — 25810000003 LACTATED RINGERS PER 1000 ML: Performed by: ANESTHESIOLOGY

## 2025-02-06 PROCEDURE — 25010000002 MORPHINE PER 10 MG: Performed by: STUDENT IN AN ORGANIZED HEALTH CARE EDUCATION/TRAINING PROGRAM

## 2025-02-06 PROCEDURE — 83735 ASSAY OF MAGNESIUM: CPT | Performed by: STUDENT IN AN ORGANIZED HEALTH CARE EDUCATION/TRAINING PROGRAM

## 2025-02-06 PROCEDURE — 94669 MECHANICAL CHEST WALL OSCILL: CPT

## 2025-02-06 PROCEDURE — 99232 SBSQ HOSP IP/OBS MODERATE 35: CPT | Performed by: INTERNAL MEDICINE

## 2025-02-06 PROCEDURE — 25010000002 LIDOCAINE 1% - EPINEPHRINE 1:100000 1 %-1:100000 SOLUTION: Performed by: STUDENT IN AN ORGANIZED HEALTH CARE EDUCATION/TRAINING PROGRAM

## 2025-02-06 PROCEDURE — 25010000002 ENOXAPARIN PER 10 MG: Performed by: STUDENT IN AN ORGANIZED HEALTH CARE EDUCATION/TRAINING PROGRAM

## 2025-02-06 PROCEDURE — 82948 REAGENT STRIP/BLOOD GLUCOSE: CPT | Performed by: INTERNAL MEDICINE

## 2025-02-06 PROCEDURE — 25010000002 HYDROMORPHONE 1 MG/ML SOLUTION

## 2025-02-06 PROCEDURE — 25010000002 PIPERACILLIN SOD-TAZOBACTAM PER 1 G: Performed by: STUDENT IN AN ORGANIZED HEALTH CARE EDUCATION/TRAINING PROGRAM

## 2025-02-06 PROCEDURE — 94761 N-INVAS EAR/PLS OXIMETRY MLT: CPT

## 2025-02-06 PROCEDURE — 25010000002 LIDOCAINE PF 2% 2 % SOLUTION

## 2025-02-06 RX ORDER — PROPOFOL 10 MG/ML
VIAL (ML) INTRAVENOUS AS NEEDED
Status: DISCONTINUED | OUTPATIENT
Start: 2025-02-06 | End: 2025-02-06 | Stop reason: SURG

## 2025-02-06 RX ORDER — ONDANSETRON 2 MG/ML
INJECTION INTRAMUSCULAR; INTRAVENOUS AS NEEDED
Status: DISCONTINUED | OUTPATIENT
Start: 2025-02-06 | End: 2025-02-06 | Stop reason: SURG

## 2025-02-06 RX ORDER — FAMOTIDINE 20 MG/1
20 TABLET, FILM COATED ORAL EVERY MORNING
Status: DISCONTINUED | OUTPATIENT
Start: 2025-02-07 | End: 2025-02-20 | Stop reason: HOSPADM

## 2025-02-06 RX ORDER — FENTANYL CITRATE 50 UG/ML
INJECTION, SOLUTION INTRAMUSCULAR; INTRAVENOUS AS NEEDED
Status: DISCONTINUED | OUTPATIENT
Start: 2025-02-06 | End: 2025-02-06 | Stop reason: SURG

## 2025-02-06 RX ORDER — LIDOCAINE HYDROCHLORIDE 20 MG/ML
INJECTION, SOLUTION EPIDURAL; INFILTRATION; INTRACAUDAL; PERINEURAL AS NEEDED
Status: DISCONTINUED | OUTPATIENT
Start: 2025-02-06 | End: 2025-02-06 | Stop reason: SURG

## 2025-02-06 RX ORDER — HYDROCODONE BITARTRATE AND ACETAMINOPHEN 5; 325 MG/1; MG/1
1 TABLET ORAL EVERY 6 HOURS PRN
Status: DISPENSED | OUTPATIENT
Start: 2025-02-06 | End: 2025-02-11

## 2025-02-06 RX ORDER — MIDAZOLAM HYDROCHLORIDE 2 MG/2ML
2 INJECTION, SOLUTION INTRAMUSCULAR; INTRAVENOUS ONCE
Status: COMPLETED | OUTPATIENT
Start: 2025-02-06 | End: 2025-02-06

## 2025-02-06 RX ORDER — ROCURONIUM BROMIDE 10 MG/ML
INJECTION, SOLUTION INTRAVENOUS AS NEEDED
Status: DISCONTINUED | OUTPATIENT
Start: 2025-02-06 | End: 2025-02-06 | Stop reason: SURG

## 2025-02-06 RX ORDER — OXYCODONE HYDROCHLORIDE 5 MG/1
5 TABLET ORAL
Status: DISCONTINUED | OUTPATIENT
Start: 2025-02-06 | End: 2025-02-06

## 2025-02-06 RX ORDER — MORPHINE SULFATE 2 MG/ML
2 INJECTION, SOLUTION INTRAMUSCULAR; INTRAVENOUS EVERY 4 HOURS PRN
Status: DISPENSED | OUTPATIENT
Start: 2025-02-06 | End: 2025-02-11

## 2025-02-06 RX ORDER — ONDANSETRON 2 MG/ML
4 INJECTION INTRAMUSCULAR; INTRAVENOUS ONCE AS NEEDED
Status: DISCONTINUED | OUTPATIENT
Start: 2025-02-06 | End: 2025-02-06

## 2025-02-06 RX ORDER — DEXAMETHASONE SODIUM PHOSPHATE 4 MG/ML
INJECTION, SOLUTION INTRA-ARTICULAR; INTRALESIONAL; INTRAMUSCULAR; INTRAVENOUS; SOFT TISSUE AS NEEDED
Status: DISCONTINUED | OUTPATIENT
Start: 2025-02-06 | End: 2025-02-06 | Stop reason: SURG

## 2025-02-06 RX ORDER — ACETAMINOPHEN 500 MG
1000 TABLET ORAL ONCE
Status: COMPLETED | OUTPATIENT
Start: 2025-02-06 | End: 2025-02-06

## 2025-02-06 RX ORDER — LIDOCAINE HYDROCHLORIDE AND EPINEPHRINE 10; 10 MG/ML; UG/ML
INJECTION, SOLUTION INFILTRATION; PERINEURAL AS NEEDED
Status: DISCONTINUED | OUTPATIENT
Start: 2025-02-06 | End: 2025-02-06 | Stop reason: HOSPADM

## 2025-02-06 RX ORDER — SODIUM CHLORIDE, SODIUM LACTATE, POTASSIUM CHLORIDE, CALCIUM CHLORIDE 600; 310; 30; 20 MG/100ML; MG/100ML; MG/100ML; MG/100ML
20 INJECTION, SOLUTION INTRAVENOUS CONTINUOUS PRN
Status: DISCONTINUED | OUTPATIENT
Start: 2025-02-06 | End: 2025-02-06

## 2025-02-06 RX ORDER — METOCLOPRAMIDE HYDROCHLORIDE 5 MG/ML
10 INJECTION INTRAMUSCULAR; INTRAVENOUS
Status: COMPLETED | OUTPATIENT
Start: 2025-02-06 | End: 2025-02-06

## 2025-02-06 RX ORDER — EPHEDRINE SULFATE 50 MG/ML
INJECTION INTRAVENOUS AS NEEDED
Status: DISCONTINUED | OUTPATIENT
Start: 2025-02-06 | End: 2025-02-06 | Stop reason: SURG

## 2025-02-06 RX ORDER — PHENYLEPHRINE HCL IN 0.9% NACL 1 MG/10 ML
SYRINGE (ML) INTRAVENOUS AS NEEDED
Status: DISCONTINUED | OUTPATIENT
Start: 2025-02-06 | End: 2025-02-06 | Stop reason: SURG

## 2025-02-06 RX ORDER — SODIUM CHLORIDE 9 MG/ML
INJECTION, SOLUTION INTRAVENOUS CONTINUOUS PRN
Status: DISCONTINUED | OUTPATIENT
Start: 2025-02-06 | End: 2025-02-06 | Stop reason: SURG

## 2025-02-06 RX ADMIN — SODIUM HYPOCHLORITE: 1.25 SOLUTION TOPICAL at 19:37

## 2025-02-06 RX ADMIN — ONDANSETRON 4 MG: 2 INJECTION INTRAMUSCULAR; INTRAVENOUS at 13:18

## 2025-02-06 RX ADMIN — Medication 100 MCG: at 12:24

## 2025-02-06 RX ADMIN — SODIUM CHLORIDE, POTASSIUM CHLORIDE, SODIUM LACTATE AND CALCIUM CHLORIDE 20 ML/HR: 600; 310; 30; 20 INJECTION, SOLUTION INTRAVENOUS at 11:12

## 2025-02-06 RX ADMIN — HYDROMORPHONE HYDROCHLORIDE 0.5 MG: 1 INJECTION, SOLUTION INTRAMUSCULAR; INTRAVENOUS; SUBCUTANEOUS at 14:35

## 2025-02-06 RX ADMIN — LIDOCAINE HYDROCHLORIDE 80 MG: 20 INJECTION, SOLUTION INTRAVENOUS at 12:15

## 2025-02-06 RX ADMIN — Medication 10 ML: at 22:21

## 2025-02-06 RX ADMIN — ARFORMOTEROL TARTRATE 15 MCG: 15 SOLUTION RESPIRATORY (INHALATION) at 19:25

## 2025-02-06 RX ADMIN — ROCURONIUM BROMIDE 80 MG: 50 INJECTION INTRAVENOUS at 12:15

## 2025-02-06 RX ADMIN — BUSPIRONE HYDROCHLORIDE 15 MG: 5 TABLET ORAL at 22:02

## 2025-02-06 RX ADMIN — TOBRAMYCIN 300 MG: 300 SOLUTION RESPIRATORY (INHALATION) at 09:47

## 2025-02-06 RX ADMIN — BUDESONIDE 0.5 MG: 0.5 INHALANT RESPIRATORY (INHALATION) at 19:28

## 2025-02-06 RX ADMIN — ASPIRIN 81 MG: 81 TABLET, COATED ORAL at 06:41

## 2025-02-06 RX ADMIN — SODIUM CHLORIDE: 9 INJECTION, SOLUTION INTRAVENOUS at 12:07

## 2025-02-06 RX ADMIN — SODIUM HYPOCHLORITE: 1.25 SOLUTION TOPICAL at 22:49

## 2025-02-06 RX ADMIN — FENTANYL CITRATE 25 MCG: 50 INJECTION, SOLUTION INTRAMUSCULAR; INTRAVENOUS at 12:52

## 2025-02-06 RX ADMIN — CARVEDILOL 25 MG: 25 TABLET, FILM COATED ORAL at 09:08

## 2025-02-06 RX ADMIN — Medication 100 MCG: at 12:36

## 2025-02-06 RX ADMIN — FENTANYL CITRATE 50 MCG: 50 INJECTION, SOLUTION INTRAMUSCULAR; INTRAVENOUS at 12:44

## 2025-02-06 RX ADMIN — EPHEDRINE SULFATE 10 MG: 50 INJECTION INTRAVENOUS at 12:36

## 2025-02-06 RX ADMIN — ARFORMOTEROL TARTRATE 15 MCG: 15 SOLUTION RESPIRATORY (INHALATION) at 09:48

## 2025-02-06 RX ADMIN — DEXAMETHASONE SODIUM PHOSPHATE 4 MG: 4 INJECTION, SOLUTION INTRAMUSCULAR; INTRAVENOUS at 12:22

## 2025-02-06 RX ADMIN — BUDESONIDE 0.5 MG: 0.5 INHALANT RESPIRATORY (INHALATION) at 09:47

## 2025-02-06 RX ADMIN — MORPHINE SULFATE 2 MG: 2 INJECTION, SOLUTION INTRAMUSCULAR; INTRAVENOUS at 17:07

## 2025-02-06 RX ADMIN — INSULIN LISPRO 2 UNITS: 100 INJECTION, SOLUTION INTRAVENOUS; SUBCUTANEOUS at 08:18

## 2025-02-06 RX ADMIN — PIPERACILLIN AND TAZOBACTAM 3.38 G: 3; .375 INJECTION, POWDER, FOR SOLUTION INTRAVENOUS at 12:57

## 2025-02-06 RX ADMIN — BUSPIRONE HYDROCHLORIDE 15 MG: 5 TABLET ORAL at 09:08

## 2025-02-06 RX ADMIN — HYDROCODONE BITARTRATE AND ACETAMINOPHEN 1 TABLET: 5; 325 TABLET ORAL at 22:12

## 2025-02-06 RX ADMIN — Medication 100 MCG: at 12:32

## 2025-02-06 RX ADMIN — MIDAZOLAM HYDROCHLORIDE 2 MG: 1 INJECTION, SOLUTION INTRAMUSCULAR; INTRAVENOUS at 11:48

## 2025-02-06 RX ADMIN — GUAIFENESIN 600 MG: 600 TABLET ORAL at 22:02

## 2025-02-06 RX ADMIN — PROPOFOL 150 MG: 10 INJECTION, EMULSION INTRAVENOUS at 12:15

## 2025-02-06 RX ADMIN — EPHEDRINE SULFATE 20 MG: 50 INJECTION INTRAVENOUS at 13:23

## 2025-02-06 RX ADMIN — EPHEDRINE SULFATE 10 MG: 50 INJECTION INTRAVENOUS at 12:32

## 2025-02-06 RX ADMIN — MONTELUKAST 10 MG: 10 TABLET, FILM COATED ORAL at 22:02

## 2025-02-06 RX ADMIN — ENOXAPARIN SODIUM 40 MG: 100 INJECTION SUBCUTANEOUS at 22:02

## 2025-02-06 RX ADMIN — REVEFENACIN 175 MCG: 175 SOLUTION RESPIRATORY (INHALATION) at 09:47

## 2025-02-06 RX ADMIN — SUGAMMADEX 200 MG: 100 INJECTION, SOLUTION INTRAVENOUS at 13:24

## 2025-02-06 RX ADMIN — METOCLOPRAMIDE 10 MG: 5 INJECTION, SOLUTION INTRAMUSCULAR; INTRAVENOUS at 11:48

## 2025-02-06 RX ADMIN — Medication 10 ML: at 22:03

## 2025-02-06 RX ADMIN — BUMETANIDE 2 MG: 1 TABLET ORAL at 22:02

## 2025-02-06 RX ADMIN — INSULIN LISPRO 6 UNITS: 100 INJECTION, SOLUTION INTRAVENOUS; SUBCUTANEOUS at 18:32

## 2025-02-06 RX ADMIN — NIFEDIPINE 30 MG: 30 TABLET, FILM COATED, EXTENDED RELEASE ORAL at 06:41

## 2025-02-06 RX ADMIN — TOBRAMYCIN 300 MG: 300 SOLUTION RESPIRATORY (INHALATION) at 19:29

## 2025-02-06 RX ADMIN — INSULIN LISPRO 7 UNITS: 100 INJECTION, SOLUTION INTRAVENOUS; SUBCUTANEOUS at 22:02

## 2025-02-06 RX ADMIN — ATORVASTATIN CALCIUM 20 MG: 10 TABLET, FILM COATED ORAL at 09:09

## 2025-02-06 RX ADMIN — CARVEDILOL 25 MG: 25 TABLET, FILM COATED ORAL at 22:02

## 2025-02-06 RX ADMIN — FAMOTIDINE 40 MG: 20 TABLET ORAL at 06:41

## 2025-02-06 RX ADMIN — EPHEDRINE SULFATE 10 MG: 50 INJECTION INTRAVENOUS at 12:30

## 2025-02-06 RX ADMIN — FENTANYL CITRATE 25 MCG: 50 INJECTION, SOLUTION INTRAMUSCULAR; INTRAVENOUS at 12:15

## 2025-02-06 RX ADMIN — ACETAMINOPHEN 1000 MG: 500 TABLET ORAL at 11:47

## 2025-02-06 RX ADMIN — PIPERACILLIN AND TAZOBACTAM 3.38 G: 3; .375 INJECTION, POWDER, FOR SOLUTION INTRAVENOUS at 05:07

## 2025-02-06 RX ADMIN — Medication 100 MCG: at 12:27

## 2025-02-06 NOTE — OP NOTE
COLOSTOMY LAPAROSCOPIC  Procedure Report    Patient Name:  Preston Wallis  YOB: 1965    Date of Surgery:  2/6/2025     Indications: Nonhealing pressure wound near the anus, need for fecal diversion.    Pre-op Diagnosis:   Wound of gluteal cleft, unspecified laterality, subsequent encounter [S31.809D]       Post-Op Diagnosis Codes:     * Wound of gluteal cleft, unspecified laterality, subsequent encounter [S31.809D]  Umbilical hernia    Procedure/CPT® Codes:  GA LAPAROSCOPY SURG COLOSTOMY/SKN LVL CECOSTOMY [00583]    Procedure(s):  Primary repair of chronically incarcerated 3 cm umbilical hernia  Laparoscopic diverting loop colostomy        Staff:  Surgeon(s):  Emerson Canada MD    Assistant: Kalli Leal RN CSA    Anesthesia: General    Estimated Blood Loss: minimal    Implants:    Nothing was implanted during the procedure    Specimen:          None        Findings: Chronically incarcerated 3 cm primary umbilical hernia.    Complications: None apparent at the time of surgery    Description of Procedure: Informed consent was obtained before the patient was brought to the operating suite and placed in supine position.  General endotracheal anesthesia was induced and maintained throughout the procedure.  The patient's abdomen was prepped and draped in a standard sterile fashion before a timeout procedure was performed, verifying the correct patient, procedure, and other pertinent information.    Local anesthetic was administered periumbilically.  Using a #15 blade scalpel, an infraumbilical curvilinear incision was made.  Sharp dissection was carried down until the cicatrix and hernia sac was encountered.  Cicatrix was then bluntly isolated and retracted away from the anterior abdominal wall.  Division between the stalk of the cicatrix and hernia sac was identified and sharply divided.  Hernia sac was sharply entered revealing a small amount of chronically incarcerated omentum.  Hernia defect was  opened to just over 3 cm using electrocautery before contents were able to be reduced back into the abdomen.  A 12 mm balloon trocar was then inserted through the hernia defect and the gas applied to achieve adequate pneumoperitoneum at 15 mmHg.  Laparoscope was inserted and confirmed safe entrance into the abdomen.    Two additional 5 mm trocars were placed in the left hemiabdomen under direct visualization.  Attention was turned to the left lower quadrant where the redundant sigmoid colon was identified.  Colon was mobilized along the white line of Toldt using a harmonic scalpel.  This dissection was carried to the level of the spleen and the left colon was retracted medially.  The identified segment of sigmoid colon for planned loop colostomy formation was easily tented up towards the anterior abdominal wall.  Using electrocautery, a quarter sized skin incision was made in the left lower quadrant within the boundary of the rectus muscle.  Sharp dissection was carried down to the anterior rectus sheath.  This was then scored in a cruciate fashion.  Underlying rectus muscle was bluntly spread in the direction of its fibers.  Peritoneum was then sharply entered.  Grasped segment of sigmoid colon was then easily delivered through the wound.  A mesenteric window was created posteriorly and the segment of colon secured with an ostomy bar.  Afferent and efferent limbs were identified.  Abdomen was allowed to desufflate after confirmation of hemostasis.  Ostomy appeared to be under no tension.  Trocars were then removed.    Umbilical hernia defect was then closed using interrupted 0 PDS in figure-of-eight fashion.  Umbilicus was tacked to the underlying fascia using a single 3-0 Vicryl.  All trocar sites were then closed using a skin stapler.  Sterile dressings were applied over top.  Attention was then turned to maturation of the loop colostomy.  Using electrocautery, the colon was partially divided.  Loop colostomy was  then matured in the standard fashion using interrupted 3-0 Vicryl.  Digital exam confirmed patency in both limbs past the fascial level.  An ostomy appliance was cut to fit and placed over top to conclude the procedure.    Patient tolerated the procedure well and there were no immediate complications.  All counts were correct x 2 at the end of the procedure.    Disposition: Stable in PACU    This procedure was not performed to treat colon cancer through resection      The surgical first assist listed above assisted with all needed aspects of the procedure.    Electronically signed by Emerson Canada MD, 02/06/25, 1:25 PM EST.

## 2025-02-06 NOTE — ANESTHESIA POSTPROCEDURE EVALUATION
Patient: Preston Wallis    Procedure Summary       Date: 02/06/25 Room / Location: [facility] OR 2 /  [facility]    Anesthesia Start: 1206 Anesthesia Stop: 1344    Procedure: COLOSTOMY LAPAROSCOPIC; plain text: creation of colostomy using small incisions (Abdomen) Diagnosis:       Wound of gluteal cleft, unspecified laterality, subsequent encounter      (Wound of gluteal cleft, unspecified laterality, subsequent encounter [S30.538Y])    Surgeons: Emerson Canada MD Provider: Akash Avila MD    Anesthesia Type: general ASA Status: 4            Anesthesia Type: general    Vitals  Vitals Value Taken Time   /55 02/06/25 1508   Temp 36.7 °C (98.1 °F) 02/06/25 1338   Pulse 78 02/06/25 1510   Resp 20 02/06/25 1355   SpO2 95 % 02/06/25 1510   Vitals shown include unfiled device data.        Post Anesthesia Care and Evaluation    Patient location during evaluation: bedside  Patient participation: complete - patient participated  Level of consciousness: awake  Pain score: 2  Pain management: adequate    Airway patency: patent  Anesthetic complications: No anesthetic complications  PONV Status: none  Cardiovascular status: acceptable and stable  Respiratory status: acceptable  Hydration status: acceptable

## 2025-02-06 NOTE — PROGRESS NOTES
Pulmonary / Critical Care Progress Note      Patient Name: Preston Wallis  : 1965  MRN: [id number]  Primary Care Physician:  Kimmy Riley MD  Date of admission: 2025    Subjective   Subjective   Follow-up for acute on chronic respiratory failure hypoxic    Over past 24 hours:  Status post bronchoscopy for airway clearance and mucous plugging   presently on MOIZ nebs feels better appears comfortable  Continues to have cough with mucopurulent sputum feels improved     Review of Systems  Complains of shortness of breath with cough and difficulty expectorating  Chest discomfort  Remains on nasal cannula  No fever or hemoptysis      Objective   Objective     Vitals:   Temp:  [97.8 °F (36.6 °C)-99.1 °F (37.3 °C)] 98.8 °F (37.1 °C)  Heart Rate:  [83-89] 85  Resp:  [16-20] 20  BP: (137-159)/(54-62) 137/57  Flow (L/min) (Oxygen Therapy):  [2] 2  Physical Exam   Vital Signs Reviewed   General: WDWN, Alert,   Appears chronically ill  HEENT:  PERRL, EOMI.  OP, nares clear, no sinus tenderness  Neck:  Supple, no JVD, no thyromegaly  Chest: Scattered bilateral crackles with rhonchi mainly at the lung bases   CV: RRR, no MGR, pulses 2+, equal.  Abd:  Soft, NT, ND, + BS, no HSM  EXT:  no clubbing, no cyanosis, no edema  Neuro:  A&Ox3, CN grossly intact, no focal deficits.  Skin: No rashes or lesions noted      Result Review    Result Review:  I have personally reviewed the results from the time of this admission to 2025 07:32 EST and agree with these findings:  [x]  Laboratory  [x]  Microbiology  [x]  Radiology  []  EKG/Telemetry   []  Cardiology/Vascular   []  Pathology  []  Old records  []  Other:  Most notable findings include: Bilateral patchy infiltrates    Assessment & Plan   Assessment / Plan     Active Hospital Problems:  Active Hospital Problems    Diagnosis     **PNA (pneumonia)     1. Incision and drainage of posterior perianal abscess 2. Sharp excisional debridement through skin and subcutaneous  tissue in the posterior perianal area, 9 x 6 x 1 cm     Perianal abscess     Wound of gluteal cleft     Bacterial pneumonia     Bronchiectasis with acute exacerbation     Pneumonia due to Pseudomonas species     Bronchiectasis without complication     COPD exacerbation          Impression:  Acute on chronic-hypoxic  and hypercapnic respiratory failure  COPD exacerbation severe  Bronchiectasis  Plan:  Status post bronchoscopy with lavage  Suggestive of Pseudomonas on bronc washings  Continue IV Zosyn and Zyvox  no AFB or fungus seen on bronc washings  Continue oxygen along with neb treatments  Continue tobramycin nebs  Continue chest physiotherapy and bronchopulmonary hygiene    VTE Prophylaxis:  Pharmacologic & mechanical VTE prophylaxis orders are present.        CODE STATUS:   Code Status (Patient has no pulse and is not breathing): CPR (Attempt to Resuscitate)  Medical Interventions (Patient has pulse or is breathing): Full Support  Electronically signed by Chadd Swan MD, 02/06/25, 3:06 PM EST.

## 2025-02-06 NOTE — PROGRESS NOTES
[facility] Clinical Pharmacy Services: Renal Dose Adjustment     Famotidine has been appropriately renally dose adjusted based on our System P&T approved policy. Pharmacy will continue to monitor patient renal function while in-house.     Pete Grimm [facility]  Clinical Pharmacist

## 2025-02-06 NOTE — INTERVAL H&P NOTE
H&P reviewed. The patient was examined and there are no changes to the H&P.      59-year-old male with perianal decubitus ulcer in need of fecal diversion.  Plan today for laparoscopic possible open diverting colostomy and any other indicated procedure.  Risks/benefits/alternatives of the procedure were explained to the patient and he was agreeable to proceed.

## 2025-02-06 NOTE — PROGRESS NOTES
[facility]   Hospitalist Progress Note  Date: 2025  Patient Name: Preston Wallis  : 1965  MRN: [id number]  Date of admission: 2025  Room/Bed: [facility]      Subjective   Subjective     Chief Complaint: SOB    Summary:Preston Wallis is a 59 y.o. male past medical history of COPD, hypertension, CHF, history of MDRO presents to the ED due to shortness of breath. Patient was discharged on  for MDRO pseudomonas pneumonia on IV ertapenem.     Patient admitted for shortness of breath.  Chest x-ray shows chronic bilateral lung infiltrates.  CT was performed to arrival of the chest which demonstrates new spiculated left lower lobe nodule, bronchiectasis throughout both lungs, micronodule or peribronchial densities suggestive of atypical infection, right paratracheal lymph node and enlarging pericardial effusion.  Pulmonology and cardiology services consulted.  TTE demonstrates very small pericardial effusion only, estimated EF 49%, with some grade 1 diastolic dysfunction left ventricle.  Wound care noted necrotic tissue perianal region.  CT abdomen pelvis was performed, 4.5 cm x 1.8 cm x 3.5 cm posterior anal abscess noted.  General surgery consulted, patient underwent incision and drainage .  S/p bronchoscopy 2025, BAL grew MDRO Pseudomonas, he completed 7 days of IV meropenem and discontinued on MOIZ nebs.  Wound culture positive for Enterococcus VRE and MDRO Citrobacter, currently receiving IV linezolid and IV Zosyn.  General surgery following and planning diverting colostomy for wound healing.  Plan for diverting colostomy procedure today.    Interval Followup: No acute overnight events.  No acute distress.  Patient was evaluated post surgery, blood in his colostomy bag.  Pain controlled with as needed pain medications.  Complaints.    Objective   Objective     Vitals:   Temp:  [98.2 °F (36.8 °C)-99.1 °F (37.3 °C)] 98.2 °F (36.8 °C)  Heart Rate:  [85-90] 90  Resp:  [16-20] 18  BP:  (137-159)/(54-65) 149/65  Flow (L/min) (Oxygen Therapy):  [2] 2    Physical Exam   Gen: NAD, Alert and Oriented  Pulm: Nasal cannula in place, no respiratory distress  Abd: soft, nondistended; colostomy site looks clean, some blood in colostomy bag.  Extremities: no pitting edema    Result Review    Result Review:  I have personally reviewed these results:  [x]  Laboratory      Lab 02/06/25 0530 02/05/25  0612 02/04/25  0513   WBC 12.92* 10.88* 10.10   HEMOGLOBIN 8.2* 8.3* 8.0*   HEMATOCRIT 25.8* 26.7* 25.8*   PLATELETS 212 210 228   NEUTROS ABS 8.59* 6.76 6.60   IMMATURE GRANS (ABS) 0.07* 0.04 0.06*   LYMPHS ABS 2.10 2.03 1.57   MONOS ABS 1.58* 1.50* 1.34*   EOS ABS 0.54* 0.52* 0.52*   MCV 88.1 88.7 89.3         Lab 02/06/25  0530 02/05/25  0612 02/04/25  0513 02/01/25  0600 01/31/25  0609   SODIUM 134* 136 143   < > 141   POTASSIUM 4.4 4.1 3.9   < > 4.1   CHLORIDE 93* 94* 98   < > 98   CO2 34.6* 36.7* 38.1*   < > 37.0*   ANION GAP 6.4 5.3 6.9   < > 6.0   BUN 47* 43* 44*   < > 95*   CREATININE 2.28* 2.09* 2.06*   < > 2.28*   EGFR 32.2* 35.8* 36.4*   < > 32.2*   GLUCOSE 168* 220* 92   < > 85   CALCIUM 8.8 8.7 8.4*   < > 8.4*   MAGNESIUM 1.5*  --   --   --  1.8   PHOSPHORUS 2.7  --   --   --  3.3    < > = values in this interval not displayed.                           Lab 01/31/25  0859 01/31/25  0609   IRON  --  31*   IRON SATURATION (TSAT)  --  17*   TIBC  --  185*   TRANSFERRIN  --  124*   FERRITIN  --  608.60*   ABO TYPING O  --    RH TYPING Negative  --    ANTIBODY SCREEN Negative  --            Brief Urine Lab Results  (Last result in the past 365 days)        Color   Clarity   Blood   Leuk Est   Nitrite   Protein   CREAT   Urine HCG        01/23/25 1912 Yellow   Turbid   Large (3+)   Moderate (2+)   Negative   >=300 mg/dL (3+)                 [x]  Microbiology   Microbiology Results (last 10 days)       Procedure Component Value - Date/Time    Virus Culture - Wash, Bronchus [[id number]] Collected: 02/03/25  1722    Lab Status: Preliminary result Specimen: Wash from Bronchus Updated: 02/06/25 0407     Viral Culture, General Comment     Comment: Preliminary Report:  No virus isolated at 24 hours. Next report to follow after 4 days.       Narrative:      Performed at:  01 - [facility] [address]  [id number]  : Subha Starr MD, Phone:  [phone]    Pneumocystis Smear By DFA - Wash, Bronchus [[id number]] Collected: 02/03/25 1722    Lab Status: Final result Specimen: Wash from Bronchus Updated: 02/05/25 1407     Pneumocystis Smear, DFA Negative    Narrative:      Performed at:  01 - [facility] [address]  [id number]  : Subha Starr MD, Phone:  [phone]    AFB Culture - Wash, Bronchus [[id number]] Collected: 02/03/25 1722    Lab Status: Preliminary result Specimen: Wash from Bronchus Updated: 02/04/25 1509     AFB Stain No acid fast bacilli seen on direct smear      No acid fast bacilli seen on concentrated smear    Respiratory Culture - Wash, Bronchus [[id number]] Collected: 02/03/25 1722    Lab Status: Preliminary result Specimen: Wash from Bronchus Updated: 02/05/25 1030     Respiratory Culture Culture in progress     Gram Stain Few (2+) WBCs seen      No organisms seen    Fungus Culture - Lavage, Lung, Right Upper Lobe [[id number]]  (Abnormal) Collected: 01/28/25 0847    Lab Status: Preliminary result Specimen: Lavage from Lung, Right Upper Lobe Updated: 02/05/25 1113     Fungus Culture Scant growth (1+) Yeast isolated    Narrative:      Sent to [facility] for ID.    AFB Culture - Lavage, Lung, Right Upper Lobe [[id number]] Collected: 01/28/25 0847    Lab Status: Preliminary result Specimen: Lavage from Lung, Right Upper Lobe Updated: 02/04/25 0915     AFB Culture No AFB isolated at 1 week     AFB Stain No acid fast bacilli seen on direct smear      No acid fast bacilli seen on concentrated smear    BAL Culture, Quantitative - Lavage, Lung,  Right Upper Lobe [[id number]]  (Abnormal) Collected: 01/28/25 0847    Lab Status: Final result Specimen: Lavage from Lung, Right Upper Lobe Updated: 02/03/25 0849     BAL Culture >100,000 CFU/mL Pseudomonas aeruginosa      No Normal Respiratory Nola     Gram Stain Moderate (3+) WBCs seen      Rare (1+) Gram positive cocci in clusters    Narrative:        Refer to previous respiratory culture collected on 01/24/2025 1618 for MICs.      Pneumonia Panel - Lavage, Lung, Right Upper Lobe [[id number]]  (Abnormal) Collected: 01/28/25 0847    Lab Status: Final result Specimen: Lavage from Lung, Right Upper Lobe Updated: 01/28/25 1120     Escherichia coli PCR Not Detected     Acinetobacter calcoaceticus-baumannii complex PCR Not Detected     Enterobacter cloacae PCR Not Detected     Klebsiella oxytoca PCR Not Detected     Klebsiella pneumoniae group PCR Not Detected     Klebsiella aerogenes PCR Not Detected     Moraxella catarrhalis PCR Not Detected     Proteus species PCR Not Detected     Pseudomonas aeroginosa PCR Detected     Comment: >=10^7 Bin copies/mL        Serratia marcescens PCR Not Detected     Staphylococcus aureus PCR Not Detected     Streptococcus pyogenes PCR Not Detected     Haemophilus influenzae PCR Not Detected     Streptococcus agalactiae PCR Not Detected     Streptococcus pneumoniae PCR Not Detected     Chlamydophila pneumoniae PCR Not Detected     Legionella pneumophilia PCR Not Detected     Mycoplasma pneumo by PCR Not Detected     ADENOVIRUS, PCR Not Detected     CTX-M Gene Not Detected     IMP Gene Not Detected     KPC Gene Not Detected     mecA/C and MREJ Gene N/A     NDM Gene Not Detected     OXA-48-like Gene N/A     VIM Gene Not Detected     Coronavirus Not Detected     Human Metapneumovirus Not Detected     Human Rhinovirus/Enterovirus Not Detected     Influenza A PCR Not Detected     Influenza B PCR Not Detected     RSV, PCR Not Detected     Parainfluenza virus PCR Not Detected          [x]   Radiology  XR Chest 1 View    Result Date: 2/5/2025  Impression: 1.Bilateral upper lobe airspace opacities corresponding to areas of bronchiectasis seen on the prior examination. 2.Right basilar airspace disease which may represent atelectasis or pneumonia. 3.Trace bilateral pleural effusions. 4.Cardiomegaly. Electronically Signed: Mynor Areli  2/5/2025 7:47 AM EST  Workstation ID: [id number]   []  EKG/Telemetry   []  Cardiology/Vascular   []  Pathology  []  Old records  []  Other:    Assessment & Plan   Assessment / Plan     Assessment:  Acute on chronic hypoxic respiratory failure  MDRO Pseudomonas pneumonia  Cystic bronchiectasis with acute exacerbation  Complicated UTI  Type 2 diabetes with hyperglycemia  CKD stage IIIa  COPD, acute exacerbation  Atrial fibrillation on Eliquis  Elevated troponin secondary to demand ischemia  HFpEF, not in acute exacerbation  History of BPH  Chronic left heel ulcer  Obstructive sleep apnea  Perianal abscess s/p I&D 2/2 Enterococcus VRE and MDRO Citrobacter    Plan:  Continue inpatient admission  Pulmonology following.  S/p bronchoscopy 1/28/2025 and 2/3/2025. BAL PCR positive for Pseudomonas.  Culture Pseudomonas.  Completed 7 days IV meropenem.  Continue MOIZ nebs   Wound culture grew Enterococcus VRE and MDRO Citrobacter.  Continue IV linezolid 600 mg twice daily and IV Zosyn.  Hold trazodone and duloxetine.  Continue BuSpar.  Continue Brovana, Pulmicort, Yupelri  Glucose better.  Holding Lantus, continue insulin sliding scale.    Creatinine stable.  General Surgery following.  S/p I&D perianal abscess 1/26/2025.  Underwent laparoscopic diverting loop colostomy today along with primary repair of chronically incarcerated 3 cm umbilical hernia.    Appreciate further input by general surgery.  Cardiology following.  Eliquis currently on hold for procedure.  Continue chronic indwelling Doyle catheter  Hemoglobin improved to 8.2, s/p 2 units PRBCs.  No active bleeding noted, no  melena or hematochezia.  Iron panel is low but elevated ferritin  A.m. labs.  Clinical course to determine disposition.       Discussed with RN.    VTE Prophylaxis:  Pharmacologic & mechanical VTE prophylaxis orders are present.        CODE STATUS:   Code Status (Patient has no pulse and is not breathing): CPR (Attempt to Resuscitate)  Medical Interventions (Patient has pulse or is breathing): Full Support      Electronically signed by Miranda Enriquez MD, 2/6/2025, 08:58 EST.

## 2025-02-06 NOTE — PLAN OF CARE
Goal Outcome Evaluation:  Plan of Care Reviewed With: patient        Progress: no change  Outcome Evaluation: A/o x4. VSS. Medicated per MAR. NPO as of midnight. Wound care performed per orders. No additional needs at this time.

## 2025-02-07 LAB
ABO GROUP BLD: NORMAL
ANION GAP SERPL CALCULATED.3IONS-SCNC: 8.2 MMOL/L (ref 5–15)
BACTERIA SPEC RESP CULT: ABNORMAL
BACTERIA SPEC RESP CULT: ABNORMAL
BASOPHILS # BLD AUTO: 0.04 10*3/MM3 (ref 0–0.2)
BASOPHILS # BLD AUTO: 0.05 10*3/MM3 (ref 0–0.2)
BASOPHILS # BLD AUTO: 0.06 10*3/MM3 (ref 0–0.2)
BASOPHILS NFR BLD AUTO: 0.2 % (ref 0–1.5)
BASOPHILS NFR BLD AUTO: 0.3 % (ref 0–1.5)
BASOPHILS NFR BLD AUTO: 0.3 % (ref 0–1.5)
BLD GP AB SCN SERPL QL: NEGATIVE
BUN SERPL-MCNC: 55 MG/DL (ref 6–20)
BUN/CREAT SERPL: 24 (ref 7–25)
CALCIUM SPEC-SCNC: 8.5 MG/DL (ref 8.6–10.5)
CHLORIDE SERPL-SCNC: 95 MMOL/L (ref 98–107)
CMV DNA SPEC QL NAA+PROBE: NEGATIVE
CO2 SERPL-SCNC: 31.8 MMOL/L (ref 22–29)
CREAT SERPL-MCNC: 2.29 MG/DL (ref 0.76–1.27)
DEPRECATED RDW RBC AUTO: 45 FL (ref 37–54)
DEPRECATED RDW RBC AUTO: 45.4 FL (ref 37–54)
DEPRECATED RDW RBC AUTO: 46.1 FL (ref 37–54)
EGFRCR SERPLBLD CKD-EPI 2021: 32.1 ML/MIN/1.73
EOSINOPHIL # BLD AUTO: 0 10*3/MM3 (ref 0–0.4)
EOSINOPHIL # BLD AUTO: 0.01 10*3/MM3 (ref 0–0.4)
EOSINOPHIL # BLD AUTO: 0.09 10*3/MM3 (ref 0–0.4)
EOSINOPHIL NFR BLD AUTO: 0 % (ref 0.3–6.2)
EOSINOPHIL NFR BLD AUTO: 0.1 % (ref 0.3–6.2)
EOSINOPHIL NFR BLD AUTO: 0.6 % (ref 0.3–6.2)
ERYTHROCYTE [DISTWIDTH] IN BLOOD BY AUTOMATED COUNT: 14.1 % (ref 12.3–15.4)
ERYTHROCYTE [DISTWIDTH] IN BLOOD BY AUTOMATED COUNT: 14.1 % (ref 12.3–15.4)
ERYTHROCYTE [DISTWIDTH] IN BLOOD BY AUTOMATED COUNT: 14.4 % (ref 12.3–15.4)
GLUCOSE BLDC GLUCOMTR-MCNC: 189 MG/DL (ref 70–99)
GLUCOSE BLDC GLUCOMTR-MCNC: 204 MG/DL (ref 70–99)
GLUCOSE BLDC GLUCOMTR-MCNC: 221 MG/DL (ref 70–99)
GLUCOSE BLDC GLUCOMTR-MCNC: 243 MG/DL (ref 70–99)
GLUCOSE SERPL-MCNC: 229 MG/DL (ref 65–99)
GRAM STN SPEC: ABNORMAL
GRAM STN SPEC: ABNORMAL
HCT VFR BLD AUTO: 19.3 % (ref 37.5–51)
HCT VFR BLD AUTO: 22.6 % (ref 37.5–51)
HCT VFR BLD AUTO: 22.6 % (ref 37.5–51)
HCT VFR BLD AUTO: 27.9 % (ref 37.5–51)
HGB BLD-MCNC: 6 G/DL (ref 13–17.7)
HGB BLD-MCNC: 7 G/DL (ref 13–17.7)
HGB BLD-MCNC: 7.1 G/DL (ref 13–17.7)
HGB BLD-MCNC: 9 G/DL (ref 13–17.7)
IMM GRANULOCYTES # BLD AUTO: 0.06 10*3/MM3 (ref 0–0.05)
IMM GRANULOCYTES # BLD AUTO: 0.1 10*3/MM3 (ref 0–0.05)
IMM GRANULOCYTES # BLD AUTO: 0.11 10*3/MM3 (ref 0–0.05)
IMM GRANULOCYTES NFR BLD AUTO: 0.4 % (ref 0–0.5)
IMM GRANULOCYTES NFR BLD AUTO: 0.5 % (ref 0–0.5)
IMM GRANULOCYTES NFR BLD AUTO: 0.6 % (ref 0–0.5)
LYMPHOCYTES # BLD AUTO: 1.01 10*3/MM3 (ref 0.7–3.1)
LYMPHOCYTES # BLD AUTO: 1.5 10*3/MM3 (ref 0.7–3.1)
LYMPHOCYTES # BLD AUTO: 1.99 10*3/MM3 (ref 0.7–3.1)
LYMPHOCYTES NFR BLD AUTO: 13.3 % (ref 19.6–45.3)
LYMPHOCYTES NFR BLD AUTO: 4.5 % (ref 19.6–45.3)
LYMPHOCYTES NFR BLD AUTO: 8.3 % (ref 19.6–45.3)
MAGNESIUM SERPL-MCNC: 1.6 MG/DL (ref 1.6–2.6)
MCH RBC QN AUTO: 27.9 PG (ref 26.6–33)
MCH RBC QN AUTO: 27.9 PG (ref 26.6–33)
MCH RBC QN AUTO: 28.2 PG (ref 26.6–33)
MCHC RBC AUTO-ENTMCNC: 31 G/DL (ref 31.5–35.7)
MCHC RBC AUTO-ENTMCNC: 31.1 G/DL (ref 31.5–35.7)
MCHC RBC AUTO-ENTMCNC: 31.4 G/DL (ref 31.5–35.7)
MCV RBC AUTO: 89.7 FL (ref 79–97)
MCV RBC AUTO: 89.8 FL (ref 79–97)
MCV RBC AUTO: 90 FL (ref 79–97)
MONOCYTES # BLD AUTO: 1.22 10*3/MM3 (ref 0.1–0.9)
MONOCYTES # BLD AUTO: 1.48 10*3/MM3 (ref 0.1–0.9)
MONOCYTES # BLD AUTO: 1.57 10*3/MM3 (ref 0.1–0.9)
MONOCYTES NFR BLD AUTO: 10.5 % (ref 5–12)
MONOCYTES NFR BLD AUTO: 5.4 % (ref 5–12)
MONOCYTES NFR BLD AUTO: 8.2 % (ref 5–12)
NEUTROPHILS NFR BLD AUTO: 11.24 10*3/MM3 (ref 1.7–7)
NEUTROPHILS NFR BLD AUTO: 14.98 10*3/MM3 (ref 1.7–7)
NEUTROPHILS NFR BLD AUTO: 20.09 10*3/MM3 (ref 1.7–7)
NEUTROPHILS NFR BLD AUTO: 74.9 % (ref 42.7–76)
NEUTROPHILS NFR BLD AUTO: 82.6 % (ref 42.7–76)
NEUTROPHILS NFR BLD AUTO: 89.3 % (ref 42.7–76)
NRBC BLD AUTO-RTO: 0 /100 WBC (ref 0–0.2)
PHOSPHATE SERPL-MCNC: 4.2 MG/DL (ref 2.5–4.5)
PLATELET # BLD AUTO: 176 10*3/MM3 (ref 140–450)
PLATELET # BLD AUTO: 190 10*3/MM3 (ref 140–450)
PLATELET # BLD AUTO: 196 10*3/MM3 (ref 140–450)
PMV BLD AUTO: 8.8 FL (ref 6–12)
PMV BLD AUTO: 8.9 FL (ref 6–12)
PMV BLD AUTO: 9.1 FL (ref 6–12)
POTASSIUM SERPL-SCNC: 4.6 MMOL/L (ref 3.5–5.2)
RBC # BLD AUTO: 2.15 10*6/MM3 (ref 4.14–5.8)
RBC # BLD AUTO: 2.51 10*6/MM3 (ref 4.14–5.8)
RBC # BLD AUTO: 2.52 10*6/MM3 (ref 4.14–5.8)
RH BLD: NEGATIVE
SODIUM SERPL-SCNC: 135 MMOL/L (ref 136–145)
SPECIMEN SOURCE: NORMAL
T&S EXPIRATION DATE: NORMAL
WBC NRBC COR # BLD AUTO: 15 10*3/MM3 (ref 3.4–10.8)
WBC NRBC COR # BLD AUTO: 18.11 10*3/MM3 (ref 3.4–10.8)
WBC NRBC COR # BLD AUTO: 22.49 10*3/MM3 (ref 3.4–10.8)

## 2025-02-07 PROCEDURE — 94799 UNLISTED PULMONARY SVC/PX: CPT

## 2025-02-07 PROCEDURE — 83735 ASSAY OF MAGNESIUM: CPT | Performed by: STUDENT IN AN ORGANIZED HEALTH CARE EDUCATION/TRAINING PROGRAM

## 2025-02-07 PROCEDURE — 82948 REAGENT STRIP/BLOOD GLUCOSE: CPT | Performed by: STUDENT IN AN ORGANIZED HEALTH CARE EDUCATION/TRAINING PROGRAM

## 2025-02-07 PROCEDURE — 25810000003 SODIUM CHLORIDE 0.9 % SOLUTION: Performed by: PHYSICIAN ASSISTANT

## 2025-02-07 PROCEDURE — 86900 BLOOD TYPING SEROLOGIC ABO: CPT

## 2025-02-07 PROCEDURE — 82948 REAGENT STRIP/BLOOD GLUCOSE: CPT

## 2025-02-07 PROCEDURE — 63710000001 INSULIN LISPRO (HUMAN) PER 5 UNITS: Performed by: STUDENT IN AN ORGANIZED HEALTH CARE EDUCATION/TRAINING PROGRAM

## 2025-02-07 PROCEDURE — 99232 SBSQ HOSP IP/OBS MODERATE 35: CPT | Performed by: INTERNAL MEDICINE

## 2025-02-07 PROCEDURE — 84100 ASSAY OF PHOSPHORUS: CPT | Performed by: STUDENT IN AN ORGANIZED HEALTH CARE EDUCATION/TRAINING PROGRAM

## 2025-02-07 PROCEDURE — 86923 COMPATIBILITY TEST ELECTRIC: CPT

## 2025-02-07 PROCEDURE — 86850 RBC ANTIBODY SCREEN: CPT | Performed by: PHYSICIAN ASSISTANT

## 2025-02-07 PROCEDURE — 80048 BASIC METABOLIC PNL TOTAL CA: CPT | Performed by: STUDENT IN AN ORGANIZED HEALTH CARE EDUCATION/TRAINING PROGRAM

## 2025-02-07 PROCEDURE — 86900 BLOOD TYPING SEROLOGIC ABO: CPT | Performed by: PHYSICIAN ASSISTANT

## 2025-02-07 PROCEDURE — P9016 RBC LEUKOCYTES REDUCED: HCPCS

## 2025-02-07 PROCEDURE — 86901 BLOOD TYPING SEROLOGIC RH(D): CPT | Performed by: PHYSICIAN ASSISTANT

## 2025-02-07 PROCEDURE — 85014 HEMATOCRIT: CPT | Performed by: STUDENT IN AN ORGANIZED HEALTH CARE EDUCATION/TRAINING PROGRAM

## 2025-02-07 PROCEDURE — 85025 COMPLETE CBC W/AUTO DIFF WBC: CPT | Performed by: STUDENT IN AN ORGANIZED HEALTH CARE EDUCATION/TRAINING PROGRAM

## 2025-02-07 PROCEDURE — 85018 HEMOGLOBIN: CPT | Performed by: STUDENT IN AN ORGANIZED HEALTH CARE EDUCATION/TRAINING PROGRAM

## 2025-02-07 PROCEDURE — 94761 N-INVAS EAR/PLS OXIMETRY MLT: CPT

## 2025-02-07 PROCEDURE — 99232 SBSQ HOSP IP/OBS MODERATE 35: CPT | Performed by: STUDENT IN AN ORGANIZED HEALTH CARE EDUCATION/TRAINING PROGRAM

## 2025-02-07 PROCEDURE — 85025 COMPLETE CBC W/AUTO DIFF WBC: CPT | Performed by: PHYSICIAN ASSISTANT

## 2025-02-07 PROCEDURE — 94664 DEMO&/EVAL PT USE INHALER: CPT

## 2025-02-07 PROCEDURE — 36430 TRANSFUSION BLD/BLD COMPNT: CPT

## 2025-02-07 PROCEDURE — 63710000001 REVEFENACIN 175 MCG/3ML SOLUTION: Performed by: STUDENT IN AN ORGANIZED HEALTH CARE EDUCATION/TRAINING PROGRAM

## 2025-02-07 RX ADMIN — TAMSULOSIN HYDROCHLORIDE 0.4 MG: 0.4 CAPSULE ORAL at 08:05

## 2025-02-07 RX ADMIN — REVEFENACIN 175 MCG: 175 SOLUTION RESPIRATORY (INHALATION) at 10:01

## 2025-02-07 RX ADMIN — BUMETANIDE 2 MG: 1 TABLET ORAL at 20:33

## 2025-02-07 RX ADMIN — Medication 10 ML: at 20:54

## 2025-02-07 RX ADMIN — MAGNESIUM HYDROXIDE 10 ML: 2400 SUSPENSION ORAL at 08:04

## 2025-02-07 RX ADMIN — SENNOSIDES AND DOCUSATE SODIUM 2 TABLET: 50; 8.6 TABLET ORAL at 20:32

## 2025-02-07 RX ADMIN — FAMOTIDINE 20 MG: 20 TABLET ORAL at 06:39

## 2025-02-07 RX ADMIN — BUDESONIDE 0.5 MG: 0.5 INHALANT RESPIRATORY (INHALATION) at 10:01

## 2025-02-07 RX ADMIN — BUSPIRONE HYDROCHLORIDE 15 MG: 5 TABLET ORAL at 20:32

## 2025-02-07 RX ADMIN — ARFORMOTEROL TARTRATE 15 MCG: 15 SOLUTION RESPIRATORY (INHALATION) at 20:33

## 2025-02-07 RX ADMIN — FINASTERIDE 5 MG: 5 TABLET, FILM COATED ORAL at 08:05

## 2025-02-07 RX ADMIN — BUDESONIDE 0.5 MG: 0.5 INHALANT RESPIRATORY (INHALATION) at 20:33

## 2025-02-07 RX ADMIN — SENNOSIDES AND DOCUSATE SODIUM 2 TABLET: 50; 8.6 TABLET ORAL at 08:04

## 2025-02-07 RX ADMIN — FERROUS SULFATE TAB 325 MG (65 MG ELEMENTAL FE) 325 MG: 325 (65 FE) TAB at 08:04

## 2025-02-07 RX ADMIN — INSULIN LISPRO 4 UNITS: 100 INJECTION, SOLUTION INTRAVENOUS; SUBCUTANEOUS at 08:04

## 2025-02-07 RX ADMIN — INSULIN LISPRO 4 UNITS: 100 INJECTION, SOLUTION INTRAVENOUS; SUBCUTANEOUS at 17:30

## 2025-02-07 RX ADMIN — Medication 10 ML: at 20:34

## 2025-02-07 RX ADMIN — GUAIFENESIN 600 MG: 600 TABLET ORAL at 20:32

## 2025-02-07 RX ADMIN — MONTELUKAST 10 MG: 10 TABLET, FILM COATED ORAL at 20:33

## 2025-02-07 RX ADMIN — SODIUM HYPOCHLORITE: 1.25 SOLUTION TOPICAL at 14:30

## 2025-02-07 RX ADMIN — POLYETHYLENE GLYCOL 3350 17 G: 17 POWDER, FOR SOLUTION ORAL at 08:04

## 2025-02-07 RX ADMIN — TOBRAMYCIN 300 MG: 300 SOLUTION RESPIRATORY (INHALATION) at 20:33

## 2025-02-07 RX ADMIN — INSULIN LISPRO 2 UNITS: 100 INJECTION, SOLUTION INTRAVENOUS; SUBCUTANEOUS at 12:37

## 2025-02-07 RX ADMIN — GUAIFENESIN 600 MG: 600 TABLET ORAL at 08:05

## 2025-02-07 RX ADMIN — BISACODYL 5 MG: 5 TABLET, COATED ORAL at 08:05

## 2025-02-07 RX ADMIN — CARVEDILOL 25 MG: 25 TABLET, FILM COATED ORAL at 20:33

## 2025-02-07 RX ADMIN — ARFORMOTEROL TARTRATE 15 MCG: 15 SOLUTION RESPIRATORY (INHALATION) at 10:01

## 2025-02-07 RX ADMIN — BUSPIRONE HYDROCHLORIDE 15 MG: 5 TABLET ORAL at 08:05

## 2025-02-07 RX ADMIN — ATORVASTATIN CALCIUM 20 MG: 10 TABLET, FILM COATED ORAL at 08:05

## 2025-02-07 RX ADMIN — INSULIN LISPRO 4 UNITS: 100 INJECTION, SOLUTION INTRAVENOUS; SUBCUTANEOUS at 20:43

## 2025-02-07 RX ADMIN — SODIUM CHLORIDE 1000 ML: 9 INJECTION, SOLUTION INTRAVENOUS at 06:32

## 2025-02-07 RX ADMIN — TOBRAMYCIN 300 MG: 300 SOLUTION RESPIRATORY (INHALATION) at 11:30

## 2025-02-07 NOTE — SIGNIFICANT NOTE
Wound Eval / Progress Noted    YAIMA Stockton     Patient Name: Preston Wallis  : 1965  MRN: [id number]  Today's Date: 2025                 Admit Date: 2025    Visit Dx:    ICD-10-CM ICD-9-CM   1. Pneumonia due to Pseudomonas species, unspecified laterality, unspecified part of lung  J15.1 482.1   2. Urinary tract infection associated with indwelling urethral catheter, initial encounter  T83.511A 996.64    N39.0 599.0   3. Perianal abscess  K61.0 566   4. COPD exacerbation  J44.1 491.21   5. Bronchiectasis without complication  J47.9 494.0   6. Allergy, initial encounter  T78.40XA 995.3   7. Acute hypoxic on chronic hypercapnic respiratory failure  J96.01 518.84    J96.12    8. Tobacco abuse, in remission  F17.201 305.1   9. Chronic dyspnea  R06.09 786.09   10. Obstructive sleep apnea  G47.33 327.23   11. Chronic respiratory failure with hypoxia and hypercapnia  J96.11 518.83    J96.12 799.02     786.09   12. Chronic obstructive pulmonary disease, unspecified COPD type  J44.9 496   13. Wound of gluteal cleft, unspecified laterality, subsequent encounter  S31.809D V58.89     877.0   14. Pneumonia of both lower lobes due to Pneumocystis jirovecii  B59 136.3   15. Bacterial pneumonia  J15.9 482.9   16. Bronchiectasis with acute exacerbation  J47.1 494.1   17. Pneumonia of both lungs due to Pseudomonas species, unspecified part of lung  J15.1 482.1   18. Difficulty walking  R26.2 719.7         PNA (pneumonia)    COPD exacerbation    Bronchiectasis without complication    Pneumonia due to Pseudomonas species    Bronchiectasis with acute exacerbation    Bacterial pneumonia    Perianal abscess    1. Incision and drainage of posterior perianal abscess 2. Sharp excisional debridement through skin and subcutaneous tissue in the posterior perianal area, 9 x 6 x 1 cm    Wound of gluteal cleft        Past Medical History:   Diagnosis Date    1. Incision and drainage of posterior perianal abscess 2. Sharp excisional  "debridement through skin and subcutaneous tissue in the posterior perianal area, 9 x 6 x 1 cm 01/26/2025    Age-related cognitive decline     Allergic contact dermatitis     Allergies     Anemia     Bedbound     11/2023 \"MY LEG MUSCLES STOPPED WORKING\"    Bronchiectasis with acute lower respiratory infection     Charcot foot due to diabetes mellitus 09/10/2013    Chronic diastolic (congestive) heart failure     Chronic kidney disease     Chronic respiratory failure with hypoxia     Closed supracondylar fracture of femur 01/12/2022    COPD (chronic obstructive pulmonary disease)     Deep vein thrombosis (DVT) of lower extremity associated with air travel 01/13/2023    Dependence on supplemental oxygen     Eczema     Erectile dysfunction     due to organic reasons    Essential (primary) hypertension     Fracture     closed fracture of other tarsal and metatarsal bones    Fracture of proximal humerus 01/13/2023    GERD without esophagitis     High risk medication use     Hypercholesteremia     Hypomagnesemia     Infected stasis ulcer of left lower extremity 01/13/2023    Insomnia     Low back pain     Major depressive disorder     Morbid (severe) obesity due to excess calories     MRSA pneumonia     Muscle weakness     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 without evidence of necrosis     Obstructive sleep apnea (adult) (pediatric)     On home O2     REPORTS WEARING 2L/NC AAT    Other forms of dyspnea     Other long term (current) drug therapy     Other specified noninfective gastroenteritis and colitis     Other spondylosis, lumbar region     Pain in both knees     Paroxysmal atrial fibrillation     Peripheral neuropathy     attributed to type 2 diabetes    Pneumonia, unspecified organism     Polyneuropathy     Rash and other nonspecific skin eruption     Self-catheterizes urinary bladder     EVERY 4 HOURS    Smoking     \"SOMETIMES\"    Syncope and collapse     Tachycardia  "    Tinnitus 01/13/2023    Type 1 diabetes mellitus with diabetic chronic kidney disease     Type 2 diabetes mellitus     Unspecified fall, initial encounter     Urinary retention         Past Surgical History:   Procedure Laterality Date    BRONCHOSCOPY N/A 1/28/2025    Procedure: BRONCHOSCOPY: BAL: insertion of lighted instrument to view inside the lung;  Surgeon: Walter Nicole DO;  Location: [facility] MAIN OR;  Service: Pulmonary;  Laterality: N/A;    BRONCHOSCOPY N/A 2/3/2025    Procedure: BRONCHOSCOPY WITH BRONCHOALVEOLAR LAVAGE, POSSIBLE BIOPSY, BRUSHING, WASHING, AIRWAY INSPECTION: insertion of lighted instrument to view inside the lung;  Surgeon: Chadd Swan MD;  Location: [facility] MAIN OR;  Service: Pulmonary;  Laterality: N/A;    CARDIAC CATHETERIZATION Left 8/15/2024    Procedure: Carbon dioxide aortogram with left leg angiogram, possible angioplasty or stenting;  Surgeon: Moshe Willson MD;  Location: [facility] CATH INVASIVE LOCATION;  Service: Vascular;  Laterality: Left;    CHOLECYSTECTOMY      COLOSTOMY N/A 2/6/2025    Procedure: COLOSTOMY LAPAROSCOPIC; plain text: creation of colostomy using small incisions;  Surgeon: Emerson Canada MD;  Location: [facility] OR [facility];  Service: General;  Laterality: N/A;    CYSTOSCOPY      FEMUR SURGERY Left     Shravan placed    INCISION AND DRAINAGE ABSCESS N/A 1/26/2025    Procedure: INCISION AND DRAINAGE ABSCESS; plain text: incision and drain pus from buttocks wound;  Surgeon: Emerson Canada MD;  Location: [facility] OR [facility];  Service: General;  Laterality: N/A;    KNEE SURGERY Left     OTHER SURGICAL HISTORY Left     venous port, REMOVED    PORTACATH PLACEMENT Right     TIBIAL PLATEAU OPEN REDUCTION INTERNAL FIXATION Left 12/22/2023    Procedure: TIBIAL PLATEAU OPEN REDUCTION INTERNAL FIXATION;  Surgeon: Hugo Kline MD;  Location: [facility] OR;  Service: Orthopedics;  Laterality: Left;    TONSILLECTOMY AND ADENOIDECTOMY           Physical  "Assessment:   02/07/25 1105   Colostomy   Placement date: If unknown, DO NOT use \"Add Comment\" note/Placement time: If unknown, DO NOT use \"Add Comment\" note: 02/06/25 1322   Hand Hygiene Completed: Yes   Wound Image     Stomal Appliance 1 piece;Leaking;Changed;Clean;Dry;Intact;Drainable   Stoma Appearance irregular;moist;swollen;dark;red;dusky;bridge in place;protruding above skin level   Peristomal Assessment Pink;Red;Other (Comment)  (ecchymotic with firmness around stoma)   Accessories/Skin Care cleansed with water;skin sealant;other (see comments)  (hydrocolloid to peristomal tissue)   Stoma Function sanguineous;flatus   Treatment Site care;Bag change;Placement checked   Output (mL) 50 mL     Wound Check / Follow-up:  Patient seen today for ostomy education and pouching system change. Patient underwent a diverting laprascopic colostomy placement on 2/6/25 to assist with healing of his perianal abscess. Patient reports he has been bedridden for approximately 2 to 3 years due to the pressure injury to his left heel. Patient's wife is present at bedside and is reported to be the designated support person. Reviewed education with patient and patient's wife to include managing ostomy, diet management with ostomy, when to perform a pouch change, how to perform a pouch change, and notified them that the goal is for patient's or patient's wife to be able to change the pouching system independently prior to discharge; questions encouraged and answered. Educational materials were left with patient/wife and they were encouraged to review materials over the weekend. Patient reports the current plan is for him to have home health support when he returns home.    Loop colostomy noted to lower aspect of abdomen. Pouching system is noted to be leaking at 9 o'clock. Emptied 50 ml of sanguineous drainage. Existing pouch removed with adhesive remover spray. Stoma is noted to be irregular, moist, and protruding above skin level. " Stoma is dusky and dark red, with pale white coloration surrounding an ulceration near 2 o'clock. An ostomy bar is in place and sutures are noted around stoma. Peristomal tissue presents with redness and ecchymosis. There is firm tissue palpated directly around stoma. There are also two areas of epidermal tissue loss around 3 to 4 o'clock. Stoma and peristomal tissue were lightly cleansed with warm water and washcloth. Peristomal tissue was blotted dry. Skin prep was applied around stoma to assist with seal. A hydrocolloid dressing was cut and applied around stoma to assist with peristomal protection. Cut to fit and placed a new one piece Coloplast flat pouch to prepped site. Seal obtained. No signs of lifting or leaking noted. Recommending to continue diligent ostomy care. Education provided throughout ostomy pouching system change. Discussed findings with General Surgeon.     Impression: Loop Colostomy.     Short term goals: Regain skin integrity, skin protection, colostomy management.    Amparo Novoa RN    2/7/2025    18:02 EST

## 2025-02-07 NOTE — PLAN OF CARE
Goal Outcome Evaluation:  Plan of Care Reviewed With: patient        Progress: no change  Outcome Evaluation: Pt is A&Ox4 this shift. Pt c/o pain/discomfort this shift, administered pain meds per MAR. Colostomy has small sanguineous output this shift. Prior to providing wound care on coccyx/perirectal area earlier in shift, pt had passed a copious blood clot from the site with active bleeding, notified provider and camille STAT CBC, results showed HgB was 7, HCT of 22.6, and WBC 22.49 (was 12.92 earlier on 2/6/25) and f/u with provider of results. Pending am lab draw to determine if pt will need another PRBC or not. Coccyx/perirectal wound will need more frequent wound care as it had been changed twice this shift. Monitoring blood glucose per orders. VSS. No new issues or new needs noted at this time.

## 2025-02-07 NOTE — PROGRESS NOTES
[facility]   Hospitalist Progress Note  Date: 2025  Patient Name: Preston Wallis  : 1965  MRN: [id number]  Date of admission: 2025  Room/Bed: [facility]      Subjective   Subjective     Chief Complaint: SOB    Summary:Preston Wallis is a 59 y.o. male past medical history of COPD, hypertension, CHF, history of MDRO presents to the ED due to shortness of breath. Patient was discharged on  for MDRO pseudomonas pneumonia on IV ertapenem.     Patient admitted for shortness of breath.  Chest x-ray shows chronic bilateral lung infiltrates.  CT was performed to arrival of the chest which demonstrates new spiculated left lower lobe nodule, bronchiectasis throughout both lungs, micronodule or peribronchial densities suggestive of atypical infection, right paratracheal lymph node and enlarging pericardial effusion.  Pulmonology and cardiology services consulted.  TTE demonstrates very small pericardial effusion only, estimated EF 49%, with some grade 1 diastolic dysfunction left ventricle.  Wound care noted necrotic tissue perianal region.  CT abdomen pelvis was performed, 4.5 cm x 1.8 cm x 3.5 cm posterior anal abscess noted.  General surgery consulted, patient underwent incision and drainage .  S/p bronchoscopy 2025, BAL grew MDRO Pseudomonas, he completed 7 days of IV meropenem and discontinued on MOIZ nebs.  Wound culture positive for Enterococcus VRE and MDRO Citrobacter, currently receiving IV linezolid and IV Zosyn.  General surgery following and planning diverting colostomy for wound healing.  Underwent laparoscopic diverting loop colostomy procedure along with primary repair of chronically incarcerated 3 cm umbilical hernia on 2025.  General surgery on board.    Interval Followup: Patient had clot with sanguinous output from his perianal wound, receiving wound care.  Colostomy site looks clean with minimal bloody output.  Hemoglobin 6 today, transfusing 3 units of PRBC.  Patient  laying comfortably in bed, no active complaints.  Leukocytosis improving.  Family at bedside, updated.    Objective   Objective     Vitals:   Temp:  [94.5 °F (34.7 °C)-99.1 °F (37.3 °C)] 99.1 °F (37.3 °C)  Heart Rate:  [73-89] 84  Resp:  [16-20] 18  BP: ()/(40-63) 143/52  Flow (L/min) (Oxygen Therapy):  [2-3] 2    Physical Exam   Gen: NAD, Alert and Oriented  Pulm: Nasal cannula in place, no respiratory distress  Abd: soft, nondistended; colostomy site looks clean, minimal blood in colostomy bag.  Extremities: no pitting edema    Result Review    Result Review:  I have personally reviewed these results:  [x]  Laboratory      Lab 02/07/25  1139 02/07/25  0500 02/06/25  2336   WBC 15.00* 18.11* 22.49*   HEMOGLOBIN 7.1* 6.0* 7.0*   HEMATOCRIT 22.6* 19.3* 22.6*   PLATELETS 176 190 196   NEUTROS ABS 11.24* 14.98* 20.09*   IMMATURE GRANS (ABS) 0.06* 0.10* 0.11*   LYMPHS ABS 1.99 1.50 1.01   MONOS ABS 1.57* 1.48* 1.22*   EOS ABS 0.09 0.01 0.00   MCV 89.7 89.8 90.0         Lab 02/07/25  0500 02/06/25  0530 02/05/25  0612   SODIUM 135* 134* 136   POTASSIUM 4.6 4.4 4.1   CHLORIDE 95* 93* 94*   CO2 31.8* 34.6* 36.7*   ANION GAP 8.2 6.4 5.3   BUN 55* 47* 43*   CREATININE 2.29* 2.28* 2.09*   EGFR 32.1* 32.2* 35.8*   GLUCOSE 229* 168* 220*   CALCIUM 8.5* 8.8 8.7   MAGNESIUM 1.6 1.5*  --    PHOSPHORUS 4.2 2.7  --                            Lab 02/07/25  0500   ABO TYPING O   RH TYPING Negative   ANTIBODY SCREEN Negative           Brief Urine Lab Results  (Last result in the past 365 days)        Color   Clarity   Blood   Leuk Est   Nitrite   Protein   CREAT   Urine HCG        01/23/25 1912 Yellow   Turbid   Large (3+)   Moderate (2+)   Negative   >=300 mg/dL (3+)                 [x]  Microbiology   Microbiology Results (last 10 days)       Procedure Component Value - Date/Time    Virus Culture - Wash, Bronchus [[id number]] Collected: 02/03/25 1722    Lab Status: Preliminary result Specimen: Wash from Bronchus Updated:  02/06/25 0407     Viral Culture, General Comment     Comment: Preliminary Report:  No virus isolated at 24 hours. Next report to follow after 4 days.       Narrative:      Performed at:  01 - [facility] [address]  [id number]  : Subha Starr MD, Phone:  [phone]    Pneumocystis Smear By DFA - Wash, Bronchus [[id number]] Collected: 02/03/25 1722    Lab Status: Final result Specimen: Wash from Bronchus Updated: 02/05/25 1407     Pneumocystis Smear, DFA Negative    Narrative:      Performed at:  01 - [facility] [address]  [id number]  : Subha Starr MD, Phone:  [phone]    AFB Culture - Wash, Bronchus [[id number]] Collected: 02/03/25 1722    Lab Status: Preliminary result Specimen: Wash from Bronchus Updated: 02/04/25 1509     AFB Stain No acid fast bacilli seen on direct smear      No acid fast bacilli seen on concentrated smear    Respiratory Culture - Wash, Bronchus [[id number]]  (Abnormal)  (Susceptibility) Collected: 02/03/25 1722    Lab Status: Final result Specimen: Wash from Bronchus Updated: 02/07/25 0818     Respiratory Culture Scant growth (1+) Pseudomonas aeruginosa MDRO     Comment: Multi drug resistant Pseudomonas, patient may be an isolation risk.  Multi drug resistant Pseudomonas, patient may be an isolation risk.         Rare growth Normal Respiratory Noal     Gram Stain Few (2+) WBCs seen      No organisms seen    Susceptibility        Pseudomonas aeruginosa MDRO      MARIO      Cefepime Resistant      Ceftazidime Intermediate      Ciprofloxacin Resistant      Imipenem Intermediate      Levofloxacin Resistant      Meropenem Resistant      Piperacillin + Tazobactam Susceptible      Tobramycin Resistant                       Susceptibility Comments       Pseudomonas aeruginosa MDRO    For MDR Pseudomonas infections, susceptibility results may not correlate to clinical outcomes. Please consider infectious disease  consult.  With the exception of urinary-sourced infections, aminoglycosides should not be used as monotherapy.                     [x]  Radiology  XR Chest 1 View    Result Date: 2/5/2025  Impression: 1.Bilateral upper lobe airspace opacities corresponding to areas of bronchiectasis seen on the prior examination. 2.Right basilar airspace disease which may represent atelectasis or pneumonia. 3.Trace bilateral pleural effusions. 4.Cardiomegaly. Electronically Signed: Mynor Santiagoelor  2/5/2025 7:47 AM EST  Workstation ID: [id number]   []  EKG/Telemetry   []  Cardiology/Vascular   []  Pathology  []  Old records  []  Other:    Assessment & Plan   Assessment / Plan     Assessment:  Acute on chronic hypoxic respiratory failure  MDRO Pseudomonas pneumonia  Cystic bronchiectasis with acute exacerbation  Complicated UTI  Type 2 diabetes with hyperglycemia  CKD stage IIIa  COPD, acute exacerbation  Atrial fibrillation on Eliquis  Elevated troponin secondary to demand ischemia  HFpEF, not in acute exacerbation  History of BPH  Chronic left heel ulcer  Obstructive sleep apnea  Perianal abscess s/p I&D 2/2 Enterococcus VRE and MDRO Citrobacter    Plan:  Continue inpatient admission  Pulmonology following.  S/p bronchoscopy 1/28/2025 and 2/3/2025. BAL PCR positive for Pseudomonas.  Culture Pseudomonas.  Completed 7 days IV meropenem.  Continue MOIZ nebs   Wound culture grew Enterococcus VRE and MDRO Citrobacter.  Continue IV linezolid 600 mg twice daily and IV Zosyn.  Hold trazodone and duloxetine.  Continue BuSpar.  Continue Brovana, Pulmicort, Yupelri  Glucose better.  Holding Lantus, continue insulin sliding scale.    Creatinine stable.  General Surgery following.  Appreciate further input.  S/p I&D perianal abscess 1/26/2025.    Underwent laparoscopic diverting loop colostomy today along with primary repair of chronically incarcerated 3 cm umbilical hernia on 2/6/2025.    Had some bleeding from perianal wound, bleeding stopped  today.  Hemoglobin down to 6, transfusing 3 units of PRBC in total today.  Repeat hemoglobin 7.1 after 1 unit transfusion.  Plan to maintain hemoglobin around 9 due to his cardiac history and comorbidities.  Appreciate further input by general surgery.  Cardiology following.  Eliquis currently on hold due to anemia.  Continue chronic indwelling Doyle catheter  A.m. labs.  Clinical course to determine disposition.       Discussed with RN.    VTE Prophylaxis:  Pharmacologic & mechanical VTE prophylaxis orders are present.        CODE STATUS:   Code Status (Patient has no pulse and is not breathing): CPR (Attempt to Resuscitate)  Medical Interventions (Patient has pulse or is breathing): Full Support      Electronically signed by Miranda Enriquez MD, 2/7/2025, 16:14 EST.

## 2025-02-07 NOTE — PROGRESS NOTES
Pulmonary / Critical Care Progress Note      Patient Name: Preston Wallis  : 1965  MRN: [id number]  Primary Care Physician:  Kmimy Riley MD  Date of admission: 2025    Subjective   Subjective   Follow-up for acute on chronic respiratory failure hypoxic    Overnight, significant bleeding from perianal abscess.    This morning,  Lying in bed  Family present at bedside  S/p surgical colostomy yesterday  Hemoglobin 6.0  Received 1 unit PRBC  Unable to use chest vest due to abdominal surgery  Dyspnea at baseline  Remains on 2 L nasal cannula  Remains weak fatigued      Objective   Objective     Vitals:   Temp:  [94.5 °F (34.7 °C)-99 °F (37.2 °C)] 95 °F (35 °C)  Heart Rate:  [74-89] 79  Resp:  [16-20] 18  BP: ()/(40-67) 124/49  Flow (L/min) (Oxygen Therapy):  [2-4] 2  FiO2 (%):  [56 %-61 %] 56 %    Physical Exam   Vital Signs Reviewed   General: WDWN, Alert, NAD, appears chronically ill, lying in bed  Chest: Scattered bilateral crackles with rhonchi to auscultation  CV: RRR, no MGR, pulses 2+, equal.  Abd:  Soft, appropriately tender, ND, colostomy bag in place  EXT:  no clubbing, no cyanosis, no edema  Neuro:  A&Ox3, CN grossly intact, no focal deficits.  Skin: No rashes or lesions noted      Result Review    Result Review:  I have personally reviewed the results from the time of this admission to 2025 11:57 EST and agree with these findings:  [x]  Laboratory  [x]  Microbiology  [x]  Radiology  []  EKG/Telemetry   []  Cardiology/Vascular   []  Pathology  []  Old records  []  Other:  Most notable findings include:      Lab 25  1139 25  0500 25  2336 25  0530 25  0612 25  0513 25  0544 25  0605 25  0600   WBC 15.00* 18.11* 22.49* 12.92* 10.88* 10.10 13.60* 10.75 14.06*   HEMOGLOBIN 7.1* 6.0* 7.0* 8.2* 8.3* 8.0* 8.6* 6.7* 7.5*   HEMATOCRIT 22.6* 19.3* 22.6* 25.8* 26.7* 25.8* 27.3* 22.0* 24.0*   PLATELETS 176 190 196 212 210 228 251 295 888    SODIUM  --  135*  --  134* 136 143 140 140 139   POTASSIUM  --  4.6  --  4.4 4.1 3.9 3.9 3.7 3.8   CHLORIDE  --  95*  --  93* 94* 98 95* 95* 95*   CO2  --  31.8*  --  34.6* 36.7* 38.1* 38.9* 41.5* 38.5*   BUN  --  55*  --  47* 43* 44* 61* 71* 86*   CREATININE  --  2.29*  --  2.28* 2.09* 2.06* 2.06* 2.13* 2.05*   GLUCOSE  --  229*  --  168* 220* 92 126* 140* 93   CALCIUM  --  8.5*  --  8.8 8.7 8.4* 8.6 8.7 8.6   PHOSPHORUS  --  4.2  --  2.7  --   --   --   --   --      XR Chest 1 View    Result Date: 2/5/2025  XR CHEST 1 VW Date of Exam: 2/5/2025 7:13 AM EST Indication: Pneumonia Comparison: Chest radiograph dated 1/23/2025, CT chest dated 1/24/2025 Findings: The patient is rotated. There is a right-sided chest port with tip terminating at the upper SVC. There is a left-sided subclavian line with tip terminating also at the upper SVC. There is cardiomegaly. There is bilateral upper lobe airspace opacity corresponding to areas of known bronchiectasis seen on the prior examination. There is right basilar airspace disease. There are probable trace bilateral pleural effusions. No visible pneumothorax.     Impression: Impression: 1.Bilateral upper lobe airspace opacities corresponding to areas of bronchiectasis seen on the prior examination. 2.Right basilar airspace disease which may represent atelectasis or pneumonia. 3.Trace bilateral pleural effusions. 4.Cardiomegaly. Electronically Signed: Mynor Singleton  2/5/2025 7:47 AM EST  Workstation ID: [id number]     Assessment & Plan   Assessment / Plan     Active Hospital Problems:  Active Hospital Problems    Diagnosis    • **PNA (pneumonia)    • 1. Incision and drainage of posterior perianal abscess 2. Sharp excisional debridement through skin and subcutaneous tissue in the posterior perianal area, 9 x 6 x 1 cm    • Perianal abscess    • Wound of gluteal cleft    • Bacterial pneumonia    • Bronchiectasis with acute exacerbation    • Pneumonia due to Pseudomonas species     • Bronchiectasis without complication    • COPD exacerbation      Impression:  Bacterial pneumonia  Bronchiectasis with exacerbation  Pneumonia due to Pseudomonas  COPD exacerbation  Perianal abscess s/p I&D  Plan:  Continue MOIZ nebs  Continue meropenem  Status post bronchoscopy with lavage  Bronchopulmonary hygiene  Continue neb treatments  Will try [place]  VTE Prophylaxis:  Pharmacologic & mechanical VTE prophylaxis orders are present.      CODE STATUS:   Code Status (Patient has no pulse and is not breathing): CPR (Attempt to Resuscitate)  Medical Interventions (Patient has pulse or is breathing): Full Support    Electronically signed by CON Camilo, 02/07/25, 11:58 AM EST.  This visit was performed by both a physician and an APC.  I personally evaluated and examined the patient.  I performed all aspects of MDM as documented.  I have reviewed and confirmed the accuracy of the patient's history as documented in this note and I have reexamined the patient and the results are consistent with the previously documented exam.  I have updated the documentation as necessary. Electronically signed by Chadd Swan MD, 02/07/25, 1:41 PM EST.

## 2025-02-07 NOTE — PLAN OF CARE
Goal Outcome Evaluation:              Outcome Evaluation: VSS. UOP; lopez in place. No c/o pain. Received 3 units PRBC today; tolerated well. All dressings changed. New order for sacral wnd dressing to include prn instructions if the wound begins bleeding again. Continue current plan of care.

## 2025-02-07 NOTE — PLAN OF CARE
Goal Outcome Evaluation:           Progress: no change  Outcome Evaluation: Pt to surgery for colostomy placement, transferred to 5stu. Colostomy has sanguineous output. Hypoactive bowel sounds. Turned q2h. Wound care complete. Coccyx wound had large amount of drainage when changing. Given morphine x1 for pain. Pt tolerating clears.

## 2025-02-08 LAB
ANION GAP SERPL CALCULATED.3IONS-SCNC: 9.6 MMOL/L (ref 5–15)
BASOPHILS # BLD AUTO: 0.06 10*3/MM3 (ref 0–0.2)
BASOPHILS NFR BLD AUTO: 0.5 % (ref 0–1.5)
BH BB BLOOD EXPIRATION DATE: NORMAL
BH BB BLOOD TYPE BARCODE: 9500
BH BB DISPENSE STATUS: NORMAL
BH BB PRODUCT CODE: NORMAL
BH BB UNIT NUMBER: NORMAL
BUN SERPL-MCNC: 53 MG/DL (ref 6–20)
BUN/CREAT SERPL: 20.5 (ref 7–25)
CALCIUM SPEC-SCNC: 8.2 MG/DL (ref 8.6–10.5)
CHLORIDE SERPL-SCNC: 97 MMOL/L (ref 98–107)
CO2 SERPL-SCNC: 28.4 MMOL/L (ref 22–29)
CREAT SERPL-MCNC: 2.58 MG/DL (ref 0.76–1.27)
CROSSMATCH INTERPRETATION: NORMAL
DEPRECATED RDW RBC AUTO: 47.5 FL (ref 37–54)
EGFRCR SERPLBLD CKD-EPI 2021: 27.8 ML/MIN/1.73
EOSINOPHIL # BLD AUTO: 0.33 10*3/MM3 (ref 0–0.4)
EOSINOPHIL NFR BLD AUTO: 2.5 % (ref 0.3–6.2)
ERYTHROCYTE [DISTWIDTH] IN BLOOD BY AUTOMATED COUNT: 14.8 % (ref 12.3–15.4)
GLUCOSE BLDC GLUCOMTR-MCNC: 165 MG/DL (ref 70–99)
GLUCOSE BLDC GLUCOMTR-MCNC: 193 MG/DL (ref 70–99)
GLUCOSE BLDC GLUCOMTR-MCNC: 274 MG/DL (ref 70–99)
GLUCOSE BLDC GLUCOMTR-MCNC: 297 MG/DL (ref 70–99)
GLUCOSE SERPL-MCNC: 174 MG/DL (ref 65–99)
HCT VFR BLD AUTO: 29.3 % (ref 37.5–51)
HGB BLD-MCNC: 9.2 G/DL (ref 13–17.7)
IMM GRANULOCYTES # BLD AUTO: 0.09 10*3/MM3 (ref 0–0.05)
IMM GRANULOCYTES NFR BLD AUTO: 0.7 % (ref 0–0.5)
LYMPHOCYTES # BLD AUTO: 2.24 10*3/MM3 (ref 0.7–3.1)
LYMPHOCYTES NFR BLD AUTO: 16.8 % (ref 19.6–45.3)
MAGNESIUM SERPL-MCNC: 1.6 MG/DL (ref 1.6–2.6)
MCH RBC QN AUTO: 28.1 PG (ref 26.6–33)
MCHC RBC AUTO-ENTMCNC: 31.4 G/DL (ref 31.5–35.7)
MCV RBC AUTO: 89.6 FL (ref 79–97)
MONOCYTES # BLD AUTO: 1.62 10*3/MM3 (ref 0.1–0.9)
MONOCYTES NFR BLD AUTO: 12.2 % (ref 5–12)
NEUTROPHILS NFR BLD AUTO: 67.3 % (ref 42.7–76)
NEUTROPHILS NFR BLD AUTO: 8.99 10*3/MM3 (ref 1.7–7)
NRBC BLD AUTO-RTO: 0 /100 WBC (ref 0–0.2)
PHOSPHATE SERPL-MCNC: 3.4 MG/DL (ref 2.5–4.5)
PLATELET # BLD AUTO: 173 10*3/MM3 (ref 140–450)
PMV BLD AUTO: 9.2 FL (ref 6–12)
POTASSIUM SERPL-SCNC: 4 MMOL/L (ref 3.5–5.2)
RBC # BLD AUTO: 3.27 10*6/MM3 (ref 4.14–5.8)
SODIUM SERPL-SCNC: 135 MMOL/L (ref 136–145)
UNIT  ABO: NORMAL
UNIT  RH: NORMAL
WBC NRBC COR # BLD AUTO: 13.33 10*3/MM3 (ref 3.4–10.8)

## 2025-02-08 PROCEDURE — 85025 COMPLETE CBC W/AUTO DIFF WBC: CPT | Performed by: STUDENT IN AN ORGANIZED HEALTH CARE EDUCATION/TRAINING PROGRAM

## 2025-02-08 PROCEDURE — 25010000002 PIPERACILLIN SOD-TAZOBACTAM PER 1 G: Performed by: STUDENT IN AN ORGANIZED HEALTH CARE EDUCATION/TRAINING PROGRAM

## 2025-02-08 PROCEDURE — 82948 REAGENT STRIP/BLOOD GLUCOSE: CPT

## 2025-02-08 PROCEDURE — 99232 SBSQ HOSP IP/OBS MODERATE 35: CPT | Performed by: INTERNAL MEDICINE

## 2025-02-08 PROCEDURE — 94799 UNLISTED PULMONARY SVC/PX: CPT

## 2025-02-08 PROCEDURE — 63710000001 INSULIN LISPRO (HUMAN) PER 5 UNITS: Performed by: STUDENT IN AN ORGANIZED HEALTH CARE EDUCATION/TRAINING PROGRAM

## 2025-02-08 PROCEDURE — 82948 REAGENT STRIP/BLOOD GLUCOSE: CPT | Performed by: STUDENT IN AN ORGANIZED HEALTH CARE EDUCATION/TRAINING PROGRAM

## 2025-02-08 PROCEDURE — 80048 BASIC METABOLIC PNL TOTAL CA: CPT | Performed by: STUDENT IN AN ORGANIZED HEALTH CARE EDUCATION/TRAINING PROGRAM

## 2025-02-08 PROCEDURE — 84100 ASSAY OF PHOSPHORUS: CPT | Performed by: STUDENT IN AN ORGANIZED HEALTH CARE EDUCATION/TRAINING PROGRAM

## 2025-02-08 PROCEDURE — 94761 N-INVAS EAR/PLS OXIMETRY MLT: CPT

## 2025-02-08 PROCEDURE — 83735 ASSAY OF MAGNESIUM: CPT | Performed by: STUDENT IN AN ORGANIZED HEALTH CARE EDUCATION/TRAINING PROGRAM

## 2025-02-08 PROCEDURE — 63710000001 REVEFENACIN 175 MCG/3ML SOLUTION: Performed by: STUDENT IN AN ORGANIZED HEALTH CARE EDUCATION/TRAINING PROGRAM

## 2025-02-08 PROCEDURE — 94664 DEMO&/EVAL PT USE INHALER: CPT

## 2025-02-08 PROCEDURE — 99232 SBSQ HOSP IP/OBS MODERATE 35: CPT | Performed by: STUDENT IN AN ORGANIZED HEALTH CARE EDUCATION/TRAINING PROGRAM

## 2025-02-08 RX ADMIN — MONTELUKAST 10 MG: 10 TABLET, FILM COATED ORAL at 20:22

## 2025-02-08 RX ADMIN — GUAIFENESIN 600 MG: 600 TABLET ORAL at 20:22

## 2025-02-08 RX ADMIN — BUMETANIDE 2 MG: 1 TABLET ORAL at 20:21

## 2025-02-08 RX ADMIN — INSULIN LISPRO 2 UNITS: 100 INJECTION, SOLUTION INTRAVENOUS; SUBCUTANEOUS at 07:52

## 2025-02-08 RX ADMIN — ATORVASTATIN CALCIUM 20 MG: 10 TABLET, FILM COATED ORAL at 08:44

## 2025-02-08 RX ADMIN — PIPERACILLIN AND TAZOBACTAM 4.5 G: 4; .5 INJECTION, POWDER, FOR SOLUTION INTRAVENOUS; PARENTERAL at 22:36

## 2025-02-08 RX ADMIN — BUMETANIDE 2 MG: 1 TABLET ORAL at 08:44

## 2025-02-08 RX ADMIN — HYDROCODONE BITARTRATE AND ACETAMINOPHEN 1 TABLET: 5; 325 TABLET ORAL at 10:46

## 2025-02-08 RX ADMIN — Medication 10 ML: at 08:46

## 2025-02-08 RX ADMIN — FINASTERIDE 5 MG: 5 TABLET, FILM COATED ORAL at 08:44

## 2025-02-08 RX ADMIN — MAGNESIUM HYDROXIDE 10 ML: 2400 SUSPENSION ORAL at 08:45

## 2025-02-08 RX ADMIN — NIFEDIPINE 30 MG: 30 TABLET, FILM COATED, EXTENDED RELEASE ORAL at 06:45

## 2025-02-08 RX ADMIN — CARVEDILOL 25 MG: 25 TABLET, FILM COATED ORAL at 20:21

## 2025-02-08 RX ADMIN — FERROUS SULFATE TAB 325 MG (65 MG ELEMENTAL FE) 325 MG: 325 (65 FE) TAB at 07:52

## 2025-02-08 RX ADMIN — SODIUM HYPOCHLORITE: 1.25 SOLUTION TOPICAL at 00:15

## 2025-02-08 RX ADMIN — BUSPIRONE HYDROCHLORIDE 15 MG: 5 TABLET ORAL at 20:21

## 2025-02-08 RX ADMIN — BUDESONIDE 0.5 MG: 0.5 INHALANT RESPIRATORY (INHALATION) at 19:34

## 2025-02-08 RX ADMIN — GUAIFENESIN 600 MG: 600 TABLET ORAL at 08:45

## 2025-02-08 RX ADMIN — FAMOTIDINE 20 MG: 20 TABLET ORAL at 06:45

## 2025-02-08 RX ADMIN — ARFORMOTEROL TARTRATE 15 MCG: 15 SOLUTION RESPIRATORY (INHALATION) at 09:20

## 2025-02-08 RX ADMIN — Medication 10 ML: at 08:54

## 2025-02-08 RX ADMIN — Medication 10 ML: at 20:22

## 2025-02-08 RX ADMIN — SENNOSIDES AND DOCUSATE SODIUM 2 TABLET: 50; 8.6 TABLET ORAL at 20:22

## 2025-02-08 RX ADMIN — ASPIRIN 81 MG: 81 TABLET, COATED ORAL at 06:45

## 2025-02-08 RX ADMIN — INSULIN LISPRO 2 UNITS: 100 INJECTION, SOLUTION INTRAVENOUS; SUBCUTANEOUS at 17:31

## 2025-02-08 RX ADMIN — TOBRAMYCIN 300 MG: 300 SOLUTION RESPIRATORY (INHALATION) at 19:38

## 2025-02-08 RX ADMIN — BUDESONIDE 0.5 MG: 0.5 INHALANT RESPIRATORY (INHALATION) at 09:19

## 2025-02-08 RX ADMIN — BISACODYL 5 MG: 5 TABLET, COATED ORAL at 08:44

## 2025-02-08 RX ADMIN — BUSPIRONE HYDROCHLORIDE 15 MG: 5 TABLET ORAL at 08:44

## 2025-02-08 RX ADMIN — ARFORMOTEROL TARTRATE 15 MCG: 15 SOLUTION RESPIRATORY (INHALATION) at 19:33

## 2025-02-08 RX ADMIN — INSULIN LISPRO 6 UNITS: 100 INJECTION, SOLUTION INTRAVENOUS; SUBCUTANEOUS at 13:36

## 2025-02-08 RX ADMIN — SODIUM HYPOCHLORITE: 1.25 SOLUTION TOPICAL at 22:37

## 2025-02-08 RX ADMIN — TAMSULOSIN HYDROCHLORIDE 0.4 MG: 0.4 CAPSULE ORAL at 08:52

## 2025-02-08 RX ADMIN — PIPERACILLIN AND TAZOBACTAM 4.5 G: 4; .5 INJECTION, POWDER, FOR SOLUTION INTRAVENOUS; PARENTERAL at 14:40

## 2025-02-08 RX ADMIN — INSULIN LISPRO 6 UNITS: 100 INJECTION, SOLUTION INTRAVENOUS; SUBCUTANEOUS at 20:20

## 2025-02-08 RX ADMIN — TOBRAMYCIN 300 MG: 300 SOLUTION RESPIRATORY (INHALATION) at 09:19

## 2025-02-08 RX ADMIN — REVEFENACIN 175 MCG: 175 SOLUTION RESPIRATORY (INHALATION) at 09:20

## 2025-02-08 RX ADMIN — CARVEDILOL 25 MG: 25 TABLET, FILM COATED ORAL at 08:44

## 2025-02-08 RX ADMIN — SENNOSIDES AND DOCUSATE SODIUM 2 TABLET: 50; 8.6 TABLET ORAL at 08:52

## 2025-02-08 RX ADMIN — POLYETHYLENE GLYCOL 3350 17 G: 17 POWDER, FOR SOLUTION ORAL at 08:45

## 2025-02-08 RX ADMIN — Medication 10 ML: at 20:21

## 2025-02-08 RX ADMIN — PIPERACILLIN AND TAZOBACTAM 4.5 G: 4; .5 INJECTION, POWDER, FOR SOLUTION INTRAVENOUS; PARENTERAL at 08:43

## 2025-02-08 RX ADMIN — SODIUM HYPOCHLORITE: 1.25 SOLUTION TOPICAL at 14:44

## 2025-02-08 NOTE — PLAN OF CARE
Goal Outcome Evaluation:  Plan of Care Reviewed With: patient        Progress: no change  Outcome Evaluation: VSS.  nsr on telemetry.  Hgb up to 9 after receiving 3 units blood yesterday.  denies pain.  dressing change completed to sacrum as ordered; no additional bleeding.

## 2025-02-08 NOTE — PROGRESS NOTES
[facility]   Hospitalist Progress Note  Date: 2025  Patient Name: Preston Wallis  : 1965  MRN: [id number]  Date of admission: 2025  Room/Bed: [facility]      Subjective   Subjective     Chief Complaint: SOB    Summary:Preston Wallis is a 59 y.o. male past medical history of COPD, hypertension, CHF, history of MDRO presents to the ED due to shortness of breath. Patient was discharged on  for MDRO pseudomonas pneumonia on IV ertapenem.     Patient admitted for shortness of breath.  Chest x-ray shows chronic bilateral lung infiltrates.  CT was performed to arrival of the chest which demonstrates new spiculated left lower lobe nodule, bronchiectasis throughout both lungs, micronodule or peribronchial densities suggestive of atypical infection, right paratracheal lymph node and enlarging pericardial effusion.  Pulmonology and cardiology services consulted.  TTE demonstrates very small pericardial effusion only, estimated EF 49%, with some grade 1 diastolic dysfunction left ventricle.  Wound care noted necrotic tissue perianal region.  CT abdomen pelvis was performed, 4.5 cm x 1.8 cm x 3.5 cm posterior anal abscess noted.  General surgery consulted, patient underwent incision and drainage .  S/p bronchoscopy 2025, BAL grew MDRO Pseudomonas, he completed 7 days of IV meropenem and discontinued on MOIZ nebs.  Wound culture positive for Enterococcus VRE and MDRO Citrobacter, currently receiving IV linezolid and IV Zosyn.  General surgery following and planning diverting colostomy for wound healing.  Underwent laparoscopic diverting loop colostomy procedure along with primary repair of chronically incarcerated 3 cm umbilical hernia on 2025.  General surgery on board.  Anemia with hemoglobin of 6 on , received 3 more units of PRBC.  Hemoglobin stable thereafter.    Interval Followup: Minimal bloody output from colostomy site.  Hemoglobin 9.2 today, stable.  No active complaints.  Stable  overnight.  Leukocytosis improving.    Objective   Objective     Vitals:   Temp:  [95 °F (35 °C)-99.1 °F (37.3 °C)] 98.2 °F (36.8 °C)  Heart Rate:  [73-91] 91  Resp:  [16-19] 18  BP: (111-158)/(45-79) 158/47  Flow (L/min) (Oxygen Therapy):  [2] 2    Physical Exam   Gen: NAD, Alert and Oriented  Pulm: Nasal cannula in place, no respiratory distress  Abd: soft, nondistended; colostomy site looks clean, minimal blood in colostomy bag.  Extremities: no pitting edema    Result Review    Result Review:  I have personally reviewed these results:  [x]  Laboratory      Lab 02/08/25  0548 02/07/25  2047 02/07/25  1139 02/07/25  0500   WBC 13.33*  --  15.00* 18.11*   HEMOGLOBIN 9.2* 9.0* 7.1* 6.0*   HEMATOCRIT 29.3* 27.9* 22.6* 19.3*   PLATELETS 173  --  176 190   NEUTROS ABS 8.99*  --  11.24* 14.98*   IMMATURE GRANS (ABS) 0.09*  --  0.06* 0.10*   LYMPHS ABS 2.24  --  1.99 1.50   MONOS ABS 1.62*  --  1.57* 1.48*   EOS ABS 0.33  --  0.09 0.01   MCV 89.6  --  89.7 89.8         Lab 02/08/25  0547 02/07/25  0500 02/06/25  0530   SODIUM 135* 135* 134*   POTASSIUM 4.0 4.6 4.4   CHLORIDE 97* 95* 93*   CO2 28.4 31.8* 34.6*   ANION GAP 9.6 8.2 6.4   BUN 53* 55* 47*   CREATININE 2.58* 2.29* 2.28*   EGFR 27.8* 32.1* 32.2*   GLUCOSE 174* 229* 168*   CALCIUM 8.2* 8.5* 8.8   MAGNESIUM 1.6 1.6 1.5*   PHOSPHORUS 3.4 4.2 2.7                           Lab 02/07/25  0500   ABO TYPING O   RH TYPING Negative   ANTIBODY SCREEN Negative           Brief Urine Lab Results  (Last result in the past 365 days)        Color   Clarity   Blood   Leuk Est   Nitrite   Protein   CREAT   Urine HCG        01/23/25 1912 Yellow   Turbid   Large (3+)   Moderate (2+)   Negative   >=300 mg/dL (3+)                 [x]  Microbiology   Microbiology Results (last 10 days)       Procedure Component Value - Date/Time    Virus Culture - Wash, Bronchus [[id number]] Collected: 02/03/25 1722    Lab Status: Preliminary result Specimen: Wash from Bronchus Updated: 02/07/25 1516      Viral Culture, General Comment     Comment: Preliminary Report:  No virus isolated at 24 hours. Next report to follow after 4 days.       Narrative:      Performed at:  02 - [facility] [place] [id number] [place], [place]  [id number]  : Subha Starr MD, Phone:  [phone]    Pneumocystis Smear By DFA - Wash, Bronchus [[id number]] Collected: 02/03/25 1722    Lab Status: Final result Specimen: Wash from Bronchus Updated: 02/05/25 1407     Pneumocystis Smear, DFA Negative    Narrative:      Performed at:  01 - [facility]  [address] [place], [place]  [id number]  : Subha Starr MD, Phone:  [phone]    AFB Culture - Wash, Bronchus [[id number]] Collected: 02/03/25 1722    Lab Status: Preliminary result Specimen: Wash from Bronchus Updated: 02/04/25 1509     AFB Stain No acid fast bacilli seen on direct smear      No acid fast bacilli seen on concentrated smear    Respiratory Culture - Wash, Bronchus [[id number]]  (Abnormal)  (Susceptibility) Collected: 02/03/25 1722    Lab Status: Final result Specimen: Wash from Bronchus Updated: 02/07/25 0818     Respiratory Culture Scant growth (1+) Pseudomonas aeruginosa MDRO     Comment: Multi drug resistant Pseudomonas, patient may be an isolation risk.  Multi drug resistant Pseudomonas, patient may be an isolation risk.         Rare growth Normal Respiratory Nola     Gram Stain Few (2+) WBCs seen      No organisms seen    Susceptibility        Pseudomonas aeruginosa MDRO      MARIO      Cefepime Resistant      Ceftazidime Intermediate      Ciprofloxacin Resistant      Imipenem Intermediate      Levofloxacin Resistant      Meropenem Resistant      Piperacillin + Tazobactam Susceptible      Tobramycin Resistant                       Susceptibility Comments       Pseudomonas aeruginosa MDRO    For MDR Pseudomonas infections, susceptibility results may not correlate to clinical outcomes. Please consider infectious disease consult.  With the  exception of urinary-sourced infections, aminoglycosides should not be used as monotherapy.               Cytomegalovirus, PCR - Wash, Bronchus [[id number]] Collected: 02/03/25 1722    Lab Status: Final result Specimen: Wash from Bronchus Updated: 02/07/25 1806     Source Comment     Comment: BRONCHUS        Cytomegalovirus PCR Negative     Comment: -------------------ADDITIONAL INFORMATION-------------------  This test was developed and its performance characteristics  determined by [facility] in a manner consistent with CLIA  requirements. This test has not been cleared or approved by  the U.S. Food and Drug Administration.       Narrative:      Performed at:  01 - [facility] [address]  [id number]  : Brad Moser PhD, Phone:  [phone]          [x]  Radiology  XR Chest 1 View    Result Date: 2/5/2025  Impression: 1.Bilateral upper lobe airspace opacities corresponding to areas of bronchiectasis seen on the prior examination. 2.Right basilar airspace disease which may represent atelectasis or pneumonia. 3.Trace bilateral pleural effusions. 4.Cardiomegaly. Electronically Signed: Mynor Singleton  2/5/2025 7:47 AM EST  Workstation ID: [id number]   []  EKG/Telemetry   []  Cardiology/Vascular   []  Pathology  []  Old records  []  Other:    Assessment & Plan   Assessment / Plan     Assessment:  Acute on chronic hypoxic respiratory failure  MDRO Pseudomonas pneumonia  Cystic bronchiectasis with acute exacerbation  Complicated UTI  Type 2 diabetes with hyperglycemia  CKD stage IIIa  COPD, acute exacerbation  Atrial fibrillation on Eliquis  Elevated troponin secondary to demand ischemia  HFpEF, not in acute exacerbation  History of BPH  Chronic left heel ulcer  Obstructive sleep apnea  Perianal abscess s/p I&D 2/2 Enterococcus VRE and MDRO Citrobacter    Plan:  Continue inpatient admission  Pulmonology following.  S/p bronchoscopy 1/28/2025 and 2/3/2025. BAL PCR  positive for Pseudomonas.  Culture Pseudomonas.  Completed 7 days IV meropenem.  Continue MOIZ nebs.  Restarted on IV Zosyn as his recent respiratory culture growing MDRO Pseudomonas susceptible to Zosyn.   Hold trazodone and duloxetine.  Continue BuSpar.  Continue Brovana, Pulmicort, Yupelri  Glucose better.  Holding Lantus, continue insulin sliding scale.    Creatinine stable.  General Surgery following.  Appreciate further input.  S/p I&D perianal abscess 1/26/2025.    Underwent laparoscopic diverting loop colostomy today along with primary repair of chronically incarcerated 3 cm umbilical hernia on 2/6/2025.    Had some bleeding from perianal wound, bleeding stopped today.  Hemoglobin down to 6 on 2/7, 3 more units of PRBC.  Hemoglobin stable with 9.2 today  Continue to trend and monitor.  Appreciate further input by general surgery.  Cardiology following.  Eliquis currently on hold due to anemia.  Will restart once anemia stable.  Continue chronic indwelling Doyle catheter  A.m. labs.  Clinical course to determine disposition.       Discussed with RN.    VTE Prophylaxis:  Pharmacologic & mechanical VTE prophylaxis orders are present.        CODE STATUS:   Code Status (Patient has no pulse and is not breathing): CPR (Attempt to Resuscitate)  Medical Interventions (Patient has pulse or is breathing): Full Support      Electronically signed by Miranda Enriquez MD, 2/8/2025, 08:04 EST.

## 2025-02-08 NOTE — PLAN OF CARE
Goal Outcome Evaluation:  Plan of Care Reviewed With: patient        Progress: improving  Outcome Evaluation: VSS, Pain tolerable today, treated one time this shift, tolerated food well, sacral wound change with no issues. Will continue to follow plan of care

## 2025-02-08 NOTE — PROGRESS NOTES
Pulmonary / Critical Care Progress Note      Patient Name: Preston Wallis  : 1965  MRN: [id number]  Primary Care Physician:  Kimmy Riley MD  Date of admission: 2025    Subjective   Subjective   Follow-up for acute on chronic respiratory failure hypoxic    Overnight, significant bleeding from perianal abscess.    This morning,  Resting in bed  Overall feeling better  Tolerating [place]  Dyspnea at baseline  Remains on 2 L NC  Hemoglobin 9.2  Remains weak fatigued      Objective   Objective     Vitals:   Temp:  [95 °F (35 °C)-99.1 °F (37.3 °C)] 98.2 °F (36.8 °C)  Heart Rate:  [73-91] 91  Resp:  [16-19] 18  BP: (111-158)/(45-79) 158/47  Flow (L/min) (Oxygen Therapy):  [2] 2    Physical Exam   Vital Signs Reviewed   General: Chronically ill-appearing, obese male, Alert, NAD, lying in bed  Chest: Scattered bilateral crackles with rhonchi to auscultation  CV: RRR, no MGR, pulses 2+, equal.  Abd:  Soft, appropriately tender, ND, colostomy bag in place  EXT:  no clubbing, no cyanosis, no edema  Neuro:  A&Ox3, CN grossly intact, no focal deficits.  Skin: No rashes or lesions noted      Result Review    Result Review:  I have personally reviewed the results from the time of this admission to 2025 10:15 EST and agree with these findings:  [x]  Laboratory  [x]  Microbiology  [x]  Radiology  []  EKG/Telemetry   []  Cardiology/Vascular   []  Pathology  []  Old records  []  Other:  Most notable findings include:        Lab 25  0548 25  0547 25  2047 25  1139 25  0500 25  2336 25  0530 25  0612 25  0513 25  0544 25  0605   WBC 13.33*  --   --  15.00* 18.11* 22.49* 12.92* 10.88* 10.10 13.60* 10.75   HEMOGLOBIN 9.2*  --  9.0* 7.1* 6.0* 7.0* 8.2* 8.3* 8.0* 8.6* 6.7*   HEMATOCRIT 29.3*  --  27.9* 22.6* 19.3* 22.6* 25.8* 26.7* 25.8* 27.3* 22.0*   PLATELETS 173  --   --  176 190 196 212 210 228 251 251   SODIUM  --  135*  --   --  135*  --  134* 136  143 140 140   POTASSIUM  --  4.0  --   --  4.6  --  4.4 4.1 3.9 3.9 3.7   CHLORIDE  --  97*  --   --  95*  --  93* 94* 98 95* 95*   CO2  --  28.4  --   --  31.8*  --  34.6* 36.7* 38.1* 38.9* 41.5*   BUN  --  53*  --   --  55*  --  47* 43* 44* 61* 71*   CREATININE  --  2.58*  --   --  2.29*  --  2.28* 2.09* 2.06* 2.06* 2.13*   GLUCOSE  --  174*  --   --  229*  --  168* 220* 92 126* 140*   CALCIUM  --  8.2*  --   --  8.5*  --  8.8 8.7 8.4* 8.6 8.7   PHOSPHORUS  --  3.4  --   --  4.2  --  2.7  --   --   --   --      Assessment & Plan   Assessment / Plan     Active Hospital Problems:  Active Hospital Problems    Diagnosis     **PNA (pneumonia)     1. Incision and drainage of posterior perianal abscess 2. Sharp excisional debridement through skin and subcutaneous tissue in the posterior perianal area, 9 x 6 x 1 cm     Perianal abscess     Wound of gluteal cleft     Bacterial pneumonia     Bronchiectasis with acute exacerbation     Pneumonia due to Pseudomonas species     Bronchiectasis without complication     COPD exacerbation      Impression:  Bronchiectasis with exacerbation  Bacterial pneumonia  Pneumonia due to MDRO Pseudomonas  COPD exacerbation  Perianal abscess status post I&D  Plan:  Continue [place]  Bronchopulmonary hygiene  Continue nebulizer treatments  IV Zosyn plus MOIZ nebs  Status post bronchoscopy  Slowly improving    VTE Prophylaxis:  Pharmacologic & mechanical VTE prophylaxis orders are present.      CODE STATUS:   Code Status (Patient has no pulse and is not breathing): CPR (Attempt to Resuscitate)  Medical Interventions (Patient has pulse or is breathing): Full Support    Electronically signed by CON Camilo, 02/08/25, 10:16 AM EST.    This visit was performed by both a physician and an APC.  I personally evaluated and examined the patient.  I performed all aspects of MDM as documented.  I have reviewed and confirmed the accuracy of the patient's history as documented in this note and I  have reexamined the patient and the results are consistent with the previously documented exam.  I have updated the documentation as necessary  Electronically signed by Chadd Swan MD, 02/08/25, 11:34 AM EST.

## 2025-02-09 LAB
ANION GAP SERPL CALCULATED.3IONS-SCNC: 9.8 MMOL/L (ref 5–15)
BASOPHILS # BLD AUTO: 0.05 10*3/MM3 (ref 0–0.2)
BASOPHILS NFR BLD AUTO: 0.3 % (ref 0–1.5)
BH BB BLOOD EXPIRATION DATE: NORMAL
BH BB BLOOD EXPIRATION DATE: NORMAL
BH BB BLOOD TYPE BARCODE: 9500
BH BB BLOOD TYPE BARCODE: 9500
BH BB DISPENSE STATUS: NORMAL
BH BB DISPENSE STATUS: NORMAL
BH BB PRODUCT CODE: NORMAL
BH BB PRODUCT CODE: NORMAL
BH BB UNIT NUMBER: NORMAL
BH BB UNIT NUMBER: NORMAL
BUN SERPL-MCNC: 58 MG/DL (ref 6–20)
BUN/CREAT SERPL: 22.5 (ref 7–25)
CALCIUM SPEC-SCNC: 8.3 MG/DL (ref 8.6–10.5)
CHLORIDE SERPL-SCNC: 96 MMOL/L (ref 98–107)
CO2 SERPL-SCNC: 29.2 MMOL/L (ref 22–29)
CREAT SERPL-MCNC: 2.58 MG/DL (ref 0.76–1.27)
CROSSMATCH INTERPRETATION: NORMAL
CROSSMATCH INTERPRETATION: NORMAL
DEPRECATED RDW RBC AUTO: 46.6 FL (ref 37–54)
EGFRCR SERPLBLD CKD-EPI 2021: 27.8 ML/MIN/1.73
EOSINOPHIL # BLD AUTO: 0.62 10*3/MM3 (ref 0–0.4)
EOSINOPHIL NFR BLD AUTO: 4.2 % (ref 0.3–6.2)
ERYTHROCYTE [DISTWIDTH] IN BLOOD BY AUTOMATED COUNT: 14.7 % (ref 12.3–15.4)
GLUCOSE BLDC GLUCOMTR-MCNC: 177 MG/DL (ref 70–99)
GLUCOSE BLDC GLUCOMTR-MCNC: 242 MG/DL (ref 70–99)
GLUCOSE BLDC GLUCOMTR-MCNC: 251 MG/DL (ref 70–99)
GLUCOSE BLDC GLUCOMTR-MCNC: 290 MG/DL (ref 70–99)
GLUCOSE SERPL-MCNC: 162 MG/DL (ref 65–99)
HCT VFR BLD AUTO: 26.9 % (ref 37.5–51)
HGB BLD-MCNC: 8.5 G/DL (ref 13–17.7)
IMM GRANULOCYTES # BLD AUTO: 0.08 10*3/MM3 (ref 0–0.05)
IMM GRANULOCYTES NFR BLD AUTO: 0.5 % (ref 0–0.5)
LYMPHOCYTES # BLD AUTO: 2.19 10*3/MM3 (ref 0.7–3.1)
LYMPHOCYTES NFR BLD AUTO: 14.8 % (ref 19.6–45.3)
MAGNESIUM SERPL-MCNC: 1.8 MG/DL (ref 1.6–2.6)
MCH RBC QN AUTO: 28.1 PG (ref 26.6–33)
MCHC RBC AUTO-ENTMCNC: 31.6 G/DL (ref 31.5–35.7)
MCV RBC AUTO: 89.1 FL (ref 79–97)
MONOCYTES # BLD AUTO: 1.26 10*3/MM3 (ref 0.1–0.9)
MONOCYTES NFR BLD AUTO: 8.5 % (ref 5–12)
NEUTROPHILS NFR BLD AUTO: 10.56 10*3/MM3 (ref 1.7–7)
NEUTROPHILS NFR BLD AUTO: 71.7 % (ref 42.7–76)
NRBC BLD AUTO-RTO: 0 /100 WBC (ref 0–0.2)
PHOSPHATE SERPL-MCNC: 2.7 MG/DL (ref 2.5–4.5)
PLATELET # BLD AUTO: 192 10*3/MM3 (ref 140–450)
PMV BLD AUTO: 8.7 FL (ref 6–12)
POTASSIUM SERPL-SCNC: 3.9 MMOL/L (ref 3.5–5.2)
RBC # BLD AUTO: 3.02 10*6/MM3 (ref 4.14–5.8)
SODIUM SERPL-SCNC: 135 MMOL/L (ref 136–145)
UNIT  ABO: NORMAL
UNIT  ABO: NORMAL
UNIT  RH: NORMAL
UNIT  RH: NORMAL
WBC NRBC COR # BLD AUTO: 14.76 10*3/MM3 (ref 3.4–10.8)

## 2025-02-09 PROCEDURE — 94799 UNLISTED PULMONARY SVC/PX: CPT

## 2025-02-09 PROCEDURE — 99232 SBSQ HOSP IP/OBS MODERATE 35: CPT | Performed by: INTERNAL MEDICINE

## 2025-02-09 PROCEDURE — 84100 ASSAY OF PHOSPHORUS: CPT | Performed by: STUDENT IN AN ORGANIZED HEALTH CARE EDUCATION/TRAINING PROGRAM

## 2025-02-09 PROCEDURE — 63710000001 REVEFENACIN 175 MCG/3ML SOLUTION: Performed by: STUDENT IN AN ORGANIZED HEALTH CARE EDUCATION/TRAINING PROGRAM

## 2025-02-09 PROCEDURE — 80048 BASIC METABOLIC PNL TOTAL CA: CPT | Performed by: STUDENT IN AN ORGANIZED HEALTH CARE EDUCATION/TRAINING PROGRAM

## 2025-02-09 PROCEDURE — 63710000001 INSULIN LISPRO (HUMAN) PER 5 UNITS: Performed by: STUDENT IN AN ORGANIZED HEALTH CARE EDUCATION/TRAINING PROGRAM

## 2025-02-09 PROCEDURE — 82948 REAGENT STRIP/BLOOD GLUCOSE: CPT | Performed by: STUDENT IN AN ORGANIZED HEALTH CARE EDUCATION/TRAINING PROGRAM

## 2025-02-09 PROCEDURE — 85025 COMPLETE CBC W/AUTO DIFF WBC: CPT | Performed by: STUDENT IN AN ORGANIZED HEALTH CARE EDUCATION/TRAINING PROGRAM

## 2025-02-09 PROCEDURE — 83735 ASSAY OF MAGNESIUM: CPT | Performed by: STUDENT IN AN ORGANIZED HEALTH CARE EDUCATION/TRAINING PROGRAM

## 2025-02-09 PROCEDURE — 94664 DEMO&/EVAL PT USE INHALER: CPT

## 2025-02-09 PROCEDURE — 25010000002 PIPERACILLIN SOD-TAZOBACTAM PER 1 G: Performed by: STUDENT IN AN ORGANIZED HEALTH CARE EDUCATION/TRAINING PROGRAM

## 2025-02-09 PROCEDURE — 94761 N-INVAS EAR/PLS OXIMETRY MLT: CPT

## 2025-02-09 PROCEDURE — 82948 REAGENT STRIP/BLOOD GLUCOSE: CPT

## 2025-02-09 RX ADMIN — HYDROCODONE BITARTRATE AND ACETAMINOPHEN 1 TABLET: 5; 325 TABLET ORAL at 04:37

## 2025-02-09 RX ADMIN — FERROUS SULFATE TAB 325 MG (65 MG ELEMENTAL FE) 325 MG: 325 (65 FE) TAB at 09:15

## 2025-02-09 RX ADMIN — MAGNESIUM HYDROXIDE 10 ML: 2400 SUSPENSION ORAL at 09:16

## 2025-02-09 RX ADMIN — ASPIRIN 81 MG: 81 TABLET, COATED ORAL at 06:15

## 2025-02-09 RX ADMIN — BUSPIRONE HYDROCHLORIDE 15 MG: 5 TABLET ORAL at 09:16

## 2025-02-09 RX ADMIN — INSULIN LISPRO 6 UNITS: 100 INJECTION, SOLUTION INTRAVENOUS; SUBCUTANEOUS at 20:23

## 2025-02-09 RX ADMIN — NIFEDIPINE 30 MG: 30 TABLET, FILM COATED, EXTENDED RELEASE ORAL at 06:16

## 2025-02-09 RX ADMIN — ATORVASTATIN CALCIUM 20 MG: 10 TABLET, FILM COATED ORAL at 09:16

## 2025-02-09 RX ADMIN — PIPERACILLIN AND TAZOBACTAM 4.5 G: 4; .5 INJECTION, POWDER, FOR SOLUTION INTRAVENOUS; PARENTERAL at 23:21

## 2025-02-09 RX ADMIN — GUAIFENESIN 600 MG: 600 TABLET ORAL at 09:16

## 2025-02-09 RX ADMIN — BUSPIRONE HYDROCHLORIDE 15 MG: 5 TABLET ORAL at 20:25

## 2025-02-09 RX ADMIN — Medication 10 ML: at 20:27

## 2025-02-09 RX ADMIN — INSULIN LISPRO 6 UNITS: 100 INJECTION, SOLUTION INTRAVENOUS; SUBCUTANEOUS at 17:09

## 2025-02-09 RX ADMIN — MONTELUKAST 10 MG: 10 TABLET, FILM COATED ORAL at 20:26

## 2025-02-09 RX ADMIN — BUDESONIDE 0.5 MG: 0.5 INHALANT RESPIRATORY (INHALATION) at 07:17

## 2025-02-09 RX ADMIN — SODIUM HYPOCHLORITE: 1.25 SOLUTION TOPICAL at 23:24

## 2025-02-09 RX ADMIN — SENNOSIDES AND DOCUSATE SODIUM 2 TABLET: 50; 8.6 TABLET ORAL at 20:26

## 2025-02-09 RX ADMIN — ARFORMOTEROL TARTRATE 15 MCG: 15 SOLUTION RESPIRATORY (INHALATION) at 07:17

## 2025-02-09 RX ADMIN — SODIUM HYPOCHLORITE: 1.25 SOLUTION TOPICAL at 12:43

## 2025-02-09 RX ADMIN — BUMETANIDE 2 MG: 1 TABLET ORAL at 09:16

## 2025-02-09 RX ADMIN — TOBRAMYCIN 300 MG: 300 SOLUTION RESPIRATORY (INHALATION) at 19:46

## 2025-02-09 RX ADMIN — CARVEDILOL 25 MG: 25 TABLET, FILM COATED ORAL at 09:16

## 2025-02-09 RX ADMIN — CARVEDILOL 25 MG: 25 TABLET, FILM COATED ORAL at 20:26

## 2025-02-09 RX ADMIN — PIPERACILLIN AND TAZOBACTAM 4.5 G: 4; .5 INJECTION, POWDER, FOR SOLUTION INTRAVENOUS; PARENTERAL at 15:22

## 2025-02-09 RX ADMIN — GUAIFENESIN 600 MG: 600 TABLET ORAL at 20:27

## 2025-02-09 RX ADMIN — TOBRAMYCIN 300 MG: 300 SOLUTION RESPIRATORY (INHALATION) at 07:31

## 2025-02-09 RX ADMIN — ARFORMOTEROL TARTRATE 15 MCG: 15 SOLUTION RESPIRATORY (INHALATION) at 19:46

## 2025-02-09 RX ADMIN — REVEFENACIN 175 MCG: 175 SOLUTION RESPIRATORY (INHALATION) at 07:17

## 2025-02-09 RX ADMIN — TAMSULOSIN HYDROCHLORIDE 0.4 MG: 0.4 CAPSULE ORAL at 09:16

## 2025-02-09 RX ADMIN — FAMOTIDINE 20 MG: 20 TABLET ORAL at 06:16

## 2025-02-09 RX ADMIN — BUDESONIDE 0.5 MG: 0.5 INHALANT RESPIRATORY (INHALATION) at 19:46

## 2025-02-09 RX ADMIN — PIPERACILLIN AND TAZOBACTAM 4.5 G: 4; .5 INJECTION, POWDER, FOR SOLUTION INTRAVENOUS; PARENTERAL at 06:16

## 2025-02-09 RX ADMIN — INSULIN LISPRO 2 UNITS: 100 INJECTION, SOLUTION INTRAVENOUS; SUBCUTANEOUS at 09:15

## 2025-02-09 RX ADMIN — BUMETANIDE 2 MG: 1 TABLET ORAL at 20:24

## 2025-02-09 RX ADMIN — INSULIN LISPRO 4 UNITS: 100 INJECTION, SOLUTION INTRAVENOUS; SUBCUTANEOUS at 12:43

## 2025-02-09 RX ADMIN — FINASTERIDE 5 MG: 5 TABLET, FILM COATED ORAL at 09:15

## 2025-02-09 RX ADMIN — Medication 10 ML: at 09:16

## 2025-02-09 NOTE — PLAN OF CARE
Goal Outcome Evaluation:  Plan of Care Reviewed With: patient        Progress: improving  Outcome Evaluation: Took over care at 1600, no complaints or concerns from patient. Will continue to follow plan of care.

## 2025-02-09 NOTE — PROGRESS NOTES
Pulmonary / Critical Care Progress Note      Patient Name: Preston Wallis  : 1965  MRN: [id number]  Primary Care Physician:  Kimmy Riley MD  Date of admission: 2025    Subjective   Subjective   Follow-up for acute on chronic respiratory failure hypoxic    No acute events overnight.    This morning,  Resting in bed  Overall feeling better  Dyspnea improved  Remains on 2 L NC  Tolerating diet  Remains weak fatigued      Objective   Objective     Vitals:   Temp:  [97.3 °F (36.3 °C)-99 °F (37.2 °C)] 98.9 °F (37.2 °C)  Heart Rate:  [81-92] 82  Resp:  [18-22] 22  BP: (123-155)/(42-55) 155/53  Flow (L/min) (Oxygen Therapy):  [2] 2    Physical Exam   Vital Signs Reviewed   General: Chronically ill-appearing, obese male, Alert, NAD, lying in bed  Chest: Diminished bilaterally with scattered rhonchi, no work of breathing noted on 2 L NC  CV: RRR, no MGR, pulses 2+, equal.  Abd:  Soft, appropriately tender, ND, colostomy bag in place  EXT:  no clubbing, no cyanosis, no edema  Neuro:  A&Ox3, CN grossly intact, no focal deficits.  Skin: No rashes or lesions noted      Result Review    Result Review:  I have personally reviewed the results from the time of this admission to 2025 10:16 EST and agree with these findings:  [x]  Laboratory  [x]  Microbiology  [x]  Radiology  []  EKG/Telemetry   []  Cardiology/Vascular   []  Pathology  []  Old records  []  Other:  Most notable findings include:        Lab 25  0346 25  0548 25  0547 25  2047 25  1139 25  0500 25  2336 25  0530 25  0612 25  0513 25  0544   WBC 14.76* 13.33*  --   --  15.00* 18.11* 22.49* 12.92* 10.88* 10.10 13.60*   HEMOGLOBIN 8.5* 9.2*  --  9.0* 7.1* 6.0* 7.0* 8.2* 8.3* 8.0* 8.6*   HEMATOCRIT 26.9* 29.3*  --  27.9* 22.6* 19.3* 22.6* 25.8* 26.7* 25.8* 27.3*   PLATELETS 192 173  --   --  176 190 196 212 210 228 251   SODIUM 135*  --  135*  --   --  135*  --  134* 136 143 140    POTASSIUM 3.9  --  4.0  --   --  4.6  --  4.4 4.1 3.9 3.9   CHLORIDE 96*  --  97*  --   --  95*  --  93* 94* 98 95*   CO2 29.2*  --  28.4  --   --  31.8*  --  34.6* 36.7* 38.1* 38.9*   BUN 58*  --  53*  --   --  55*  --  47* 43* 44* 61*   CREATININE 2.58*  --  2.58*  --   --  2.29*  --  2.28* 2.09* 2.06* 2.06*   GLUCOSE 162*  --  174*  --   --  229*  --  168* 220* 92 126*   CALCIUM 8.3*  --  8.2*  --   --  8.5*  --  8.8 8.7 8.4* 8.6   PHOSPHORUS 2.7  --  3.4  --   --  4.2  --  2.7  --   --   --      Assessment & Plan   Assessment / Plan     Active Hospital Problems:  Active Hospital Problems    Diagnosis    • **PNA (pneumonia)    • 1. Incision and drainage of posterior perianal abscess 2. Sharp excisional debridement through skin and subcutaneous tissue in the posterior perianal area, 9 x 6 x 1 cm    • Perianal abscess    • Wound of gluteal cleft    • Bacterial pneumonia    • Bronchiectasis with acute exacerbation    • Pneumonia due to Pseudomonas species    • Bronchiectasis without complication    • COPD exacerbation      Impression:  Bronchiectasis with exacerbation   bacterial pneumonia  Perianal abscess  COPD exacerbation  Pneumonia secondary to per Pseudomonas  Plan:  Continue [place]  Bronchopulmonary hygiene  IV Zosyn plus MOIZ nebs  Status post bronchoscopy  Continue oxygen at 2 L/min  Continue nebulizer treatments   VTE Prophylaxis:  Pharmacologic & mechanical VTE prophylaxis orders are present.      CODE STATUS:   Code Status (Patient has no pulse and is not breathing): CPR (Attempt to Resuscitate)  Medical Interventions (Patient has pulse or is breathing): Full Support    Electronically signed by CON Camilo, 02/09/25, 10:19 AM EST.    This visit was performed by both a physician and an APC.  I personally evaluated and examined the patient.  I performed all aspects of MDM as documented.  I have reviewed and confirmed the accuracy of the patient's history as documented in this note and I have  reexamined the patient and the results are consistent with the previously documented exam.  I have updated the documentation as necessary Electronically signed by Chadd Swan MD, 02/09/25, 12:54 PM EST.

## 2025-02-09 NOTE — PROGRESS NOTES
"    [facility] HOSPITALIST PROGRESS NOTE    S: Patient seen and examined.  States he feels about the same.  No acute events noted overnight.  No chest pain.    O: /53 (BP Location: Left arm, Patient Position: Lying)   Pulse 82   Temp 98.9 °F (37.2 °C) (Oral)   Resp 22   Ht 175.3 cm (69\")   Wt 111 kg (245 lb 6 oz)   SpO2 96%   BMI 36.24 kg/m²     GENERAL: Age-appropriate, no acute distress, chronically ill-appearing  EARS,NOSE, MOUTH, THROAT:  MMM,  EYES: PERRLA, EOMI  CV:  NL S1/S2  RESPIRATORY: Diminished bilaterally with bilateral wheezing noted  GI:  S/NT/ND +BS  SKIN: No rash or lesions. No nodules.  INT: No edema. No joint deformity.  PSYCH:  Normal affect, A+O x 3  NEURO: Alert and oriented, grossly nonfocal.    Scheduled Meds:arformoterol, 15 mcg, Nebulization, BID - RT  aspirin, 81 mg, Oral, QAM  atorvastatin, 20 mg, Oral, Daily  senna-docusate sodium, 2 tablet, Oral, BID   And  polyethylene glycol, 17 g, Oral, Daily   And  bisacodyl, 5 mg, Oral, Daily  budesonide, 0.5 mg, Nebulization, BID  bumetanide, 2 mg, Oral, BID  busPIRone, 15 mg, Oral, BID  carvedilol, 25 mg, Oral, Q12H  [Held by provider] enoxaparin, 40 mg, Subcutaneous, Nightly  famotidine, 20 mg, Oral, QAM  ferrous sulfate, 325 mg, Oral, Daily With Breakfast  finasteride, 5 mg, Oral, Daily  guaiFENesin, 600 mg, Oral, Q12H  insulin lispro, 2-9 Units, Subcutaneous, 4x Daily AC & at Bedtime  magnesium hydroxide, 10 mL, Oral, Daily  montelukast, 10 mg, Oral, Nightly  NIFEdipine XL, 30 mg, Oral, QAM  piperacillin-tazobactam, 4.5 g, Intravenous, Q8H  revefenacin, 175 mcg, Nebulization, Daily - RT  sodium chloride, 10 mL, Intravenous, Q12H  sodium chloride, 10 mL, Intravenous, Q12H  Sodium Hypochlorite, , Topical, 2 times per day  tamsulosin, 0.4 mg, Oral, Daily  tobramycin PF, 300 mg, Nebulization, BID - RT      Continuous Infusions:   PRN Meds:.  senna-docusate sodium **AND** polyethylene glycol **AND** bisacodyl **AND** " "bisacodyl    dextrose    dextrose    glucagon (human recombinant)    heparin    HYDROcodone-acetaminophen    ipratropium-albuterol    Morphine    nitroglycerin    simethicone    sodium chloride    sodium chloride    sodium chloride    sodium chloride    sodium chloride    sodium chloride    Labs: Laboratory information reviewed and reconciled.    Results from last 7 days   Lab Units 02/09/25  0346   WBC 10*3/mm3 14.76*         Results from last 7 days   Lab Units 02/09/25  0346   CO2 mmol/L 29.2*   BUN mg/dL 58*   CREATININE mg/dL 2.58*   GLUCOSE mg/dL 162*             Invalid input(s): \"APT\"      Imaging (last 24 hours):    No radiology results for the last day   Assessment/plan:  # MDRO Pseudomonas pneumonia  # Acute on chronic hypoxic respiratory failure  -Pulmonology following  -Supportive care  -Continue Zosyn and tobramycin nebs  -Supportive care  -Serial labs  -Status post bronchoscopy January 28 and February 3    # Perianal abscess  -Status post I&D January 26  -Status post diverting loop colostomy February 6  -Appreciate surgery recommendations  -Wound care    # Diabetes mellitus type 2  -Insulin sliding scale, monitor requirements and adjust as needed    # Paroxysmal atrial fibrillation  -Eliquis currently on hold due to anemia  -Continue to monitor and add back as appropriate    DVT Prophylaxis: Chemical VTE prophylaxis currently on hold, resume as appropriate  Disposition: Continue to monitor  Discharge disposition: To be determined  Prognosis: Guarded    Lino Leal Jr, MD  [facility] Hospitalist  02/09/25  09:11 EST  "

## 2025-02-10 LAB
ALBUMIN SERPL-MCNC: 2.8 G/DL (ref 3.5–5.2)
ALBUMIN/GLOB SERPL: 0.8 G/DL
ALP SERPL-CCNC: 127 U/L (ref 39–117)
ALT SERPL W P-5'-P-CCNC: 21 U/L (ref 1–41)
ANION GAP SERPL CALCULATED.3IONS-SCNC: 11.2 MMOL/L (ref 5–15)
ANION GAP SERPL CALCULATED.3IONS-SCNC: 9.4 MMOL/L (ref 5–15)
AST SERPL-CCNC: 17 U/L (ref 1–40)
BASOPHILS # BLD AUTO: 0.07 10*3/MM3 (ref 0–0.2)
BASOPHILS NFR BLD AUTO: 0.5 % (ref 0–1.5)
BILIRUB SERPL-MCNC: 0.4 MG/DL (ref 0–1.2)
BUN SERPL-MCNC: 58 MG/DL (ref 6–20)
BUN SERPL-MCNC: 63 MG/DL (ref 6–20)
BUN/CREAT SERPL: 23.3 (ref 7–25)
BUN/CREAT SERPL: 23.3 (ref 7–25)
CALCIUM SPEC-SCNC: 8.4 MG/DL (ref 8.6–10.5)
CALCIUM SPEC-SCNC: 8.5 MG/DL (ref 8.6–10.5)
CHLORIDE SERPL-SCNC: 96 MMOL/L (ref 98–107)
CHLORIDE SERPL-SCNC: 98 MMOL/L (ref 98–107)
CO2 SERPL-SCNC: 30.6 MMOL/L (ref 22–29)
CO2 SERPL-SCNC: 30.8 MMOL/L (ref 22–29)
CREAT SERPL-MCNC: 2.49 MG/DL (ref 0.76–1.27)
CREAT SERPL-MCNC: 2.7 MG/DL (ref 0.76–1.27)
D-LACTATE SERPL-SCNC: 1.1 MMOL/L (ref 0.5–2)
DEPRECATED RDW RBC AUTO: 46.8 FL (ref 37–54)
EGFRCR SERPLBLD CKD-EPI 2021: 26.3 ML/MIN/1.73
EGFRCR SERPLBLD CKD-EPI 2021: 29 ML/MIN/1.73
EOSINOPHIL # BLD AUTO: 0.76 10*3/MM3 (ref 0–0.4)
EOSINOPHIL NFR BLD AUTO: 5.5 % (ref 0.3–6.2)
ERYTHROCYTE [DISTWIDTH] IN BLOOD BY AUTOMATED COUNT: 14.6 % (ref 12.3–15.4)
GLOBULIN UR ELPH-MCNC: 3.5 GM/DL
GLUCOSE BLDC GLUCOMTR-MCNC: 169 MG/DL (ref 70–99)
GLUCOSE BLDC GLUCOMTR-MCNC: 198 MG/DL (ref 70–99)
GLUCOSE BLDC GLUCOMTR-MCNC: 200 MG/DL (ref 70–99)
GLUCOSE BLDC GLUCOMTR-MCNC: 285 MG/DL (ref 70–99)
GLUCOSE SERPL-MCNC: 181 MG/DL (ref 65–99)
GLUCOSE SERPL-MCNC: 250 MG/DL (ref 65–99)
HCT VFR BLD AUTO: 28.6 % (ref 37.5–51)
HGB BLD-MCNC: 9.1 G/DL (ref 13–17.7)
IMM GRANULOCYTES # BLD AUTO: 0.09 10*3/MM3 (ref 0–0.05)
IMM GRANULOCYTES NFR BLD AUTO: 0.7 % (ref 0–0.5)
LYMPHOCYTES # BLD AUTO: 1.91 10*3/MM3 (ref 0.7–3.1)
LYMPHOCYTES NFR BLD AUTO: 13.8 % (ref 19.6–45.3)
MAGNESIUM SERPL-MCNC: 1.7 MG/DL (ref 1.6–2.6)
MCH RBC QN AUTO: 28.4 PG (ref 26.6–33)
MCHC RBC AUTO-ENTMCNC: 31.8 G/DL (ref 31.5–35.7)
MCV RBC AUTO: 89.4 FL (ref 79–97)
MONOCYTES # BLD AUTO: 1.1 10*3/MM3 (ref 0.1–0.9)
MONOCYTES NFR BLD AUTO: 7.9 % (ref 5–12)
NEUTROPHILS NFR BLD AUTO: 71.6 % (ref 42.7–76)
NEUTROPHILS NFR BLD AUTO: 9.91 10*3/MM3 (ref 1.7–7)
NRBC BLD AUTO-RTO: 0 /100 WBC (ref 0–0.2)
PHOSPHATE SERPL-MCNC: 2.6 MG/DL (ref 2.5–4.5)
PLATELET # BLD AUTO: 229 10*3/MM3 (ref 140–450)
PMV BLD AUTO: 8.8 FL (ref 6–12)
POTASSIUM SERPL-SCNC: 4.1 MMOL/L (ref 3.5–5.2)
POTASSIUM SERPL-SCNC: 4.3 MMOL/L (ref 3.5–5.2)
PROCALCITONIN SERPL-MCNC: 0.63 NG/ML (ref 0–0.25)
PROT SERPL-MCNC: 6.3 G/DL (ref 6–8.5)
RBC # BLD AUTO: 3.2 10*6/MM3 (ref 4.14–5.8)
SODIUM SERPL-SCNC: 138 MMOL/L (ref 136–145)
SODIUM SERPL-SCNC: 138 MMOL/L (ref 136–145)
WBC NRBC COR # BLD AUTO: 13.84 10*3/MM3 (ref 3.4–10.8)
WHOLE BLOOD HOLD SPECIMEN: NORMAL

## 2025-02-10 PROCEDURE — 63710000001 INSULIN LISPRO (HUMAN) PER 5 UNITS: Performed by: STUDENT IN AN ORGANIZED HEALTH CARE EDUCATION/TRAINING PROGRAM

## 2025-02-10 PROCEDURE — 63710000001 REVEFENACIN 175 MCG/3ML SOLUTION: Performed by: STUDENT IN AN ORGANIZED HEALTH CARE EDUCATION/TRAINING PROGRAM

## 2025-02-10 PROCEDURE — 83735 ASSAY OF MAGNESIUM: CPT | Performed by: STUDENT IN AN ORGANIZED HEALTH CARE EDUCATION/TRAINING PROGRAM

## 2025-02-10 PROCEDURE — 99232 SBSQ HOSP IP/OBS MODERATE 35: CPT | Performed by: INTERNAL MEDICINE

## 2025-02-10 PROCEDURE — 94664 DEMO&/EVAL PT USE INHALER: CPT

## 2025-02-10 PROCEDURE — 99233 SBSQ HOSP IP/OBS HIGH 50: CPT | Performed by: INTERNAL MEDICINE

## 2025-02-10 PROCEDURE — 83605 ASSAY OF LACTIC ACID: CPT | Performed by: INTERNAL MEDICINE

## 2025-02-10 PROCEDURE — 94799 UNLISTED PULMONARY SVC/PX: CPT

## 2025-02-10 PROCEDURE — 85025 COMPLETE CBC W/AUTO DIFF WBC: CPT | Performed by: STUDENT IN AN ORGANIZED HEALTH CARE EDUCATION/TRAINING PROGRAM

## 2025-02-10 PROCEDURE — 84145 PROCALCITONIN (PCT): CPT | Performed by: INTERNAL MEDICINE

## 2025-02-10 PROCEDURE — 82948 REAGENT STRIP/BLOOD GLUCOSE: CPT

## 2025-02-10 PROCEDURE — 80053 COMPREHEN METABOLIC PANEL: CPT | Performed by: STUDENT IN AN ORGANIZED HEALTH CARE EDUCATION/TRAINING PROGRAM

## 2025-02-10 PROCEDURE — 94761 N-INVAS EAR/PLS OXIMETRY MLT: CPT

## 2025-02-10 PROCEDURE — 25010000002 PIPERACILLIN SOD-TAZOBACTAM PER 1 G: Performed by: STUDENT IN AN ORGANIZED HEALTH CARE EDUCATION/TRAINING PROGRAM

## 2025-02-10 PROCEDURE — 84100 ASSAY OF PHOSPHORUS: CPT | Performed by: STUDENT IN AN ORGANIZED HEALTH CARE EDUCATION/TRAINING PROGRAM

## 2025-02-10 PROCEDURE — 82948 REAGENT STRIP/BLOOD GLUCOSE: CPT | Performed by: STUDENT IN AN ORGANIZED HEALTH CARE EDUCATION/TRAINING PROGRAM

## 2025-02-10 PROCEDURE — 87040 BLOOD CULTURE FOR BACTERIA: CPT | Performed by: INTERNAL MEDICINE

## 2025-02-10 RX ADMIN — REVEFENACIN 175 MCG: 175 SOLUTION RESPIRATORY (INHALATION) at 07:13

## 2025-02-10 RX ADMIN — ARFORMOTEROL TARTRATE 15 MCG: 15 SOLUTION RESPIRATORY (INHALATION) at 22:04

## 2025-02-10 RX ADMIN — PIPERACILLIN AND TAZOBACTAM 4.5 G: 4; .5 INJECTION, POWDER, FOR SOLUTION INTRAVENOUS; PARENTERAL at 22:36

## 2025-02-10 RX ADMIN — TAMSULOSIN HYDROCHLORIDE 0.4 MG: 0.4 CAPSULE ORAL at 09:23

## 2025-02-10 RX ADMIN — BUDESONIDE 0.5 MG: 0.5 INHALANT RESPIRATORY (INHALATION) at 22:04

## 2025-02-10 RX ADMIN — SODIUM HYPOCHLORITE: 1.25 SOLUTION TOPICAL at 12:27

## 2025-02-10 RX ADMIN — PIPERACILLIN AND TAZOBACTAM 4.5 G: 4; .5 INJECTION, POWDER, FOR SOLUTION INTRAVENOUS; PARENTERAL at 15:27

## 2025-02-10 RX ADMIN — BUSPIRONE HYDROCHLORIDE 15 MG: 5 TABLET ORAL at 09:23

## 2025-02-10 RX ADMIN — BUSPIRONE HYDROCHLORIDE 15 MG: 5 TABLET ORAL at 20:24

## 2025-02-10 RX ADMIN — GUAIFENESIN 600 MG: 600 TABLET ORAL at 20:24

## 2025-02-10 RX ADMIN — BUMETANIDE 2 MG: 1 TABLET ORAL at 09:23

## 2025-02-10 RX ADMIN — FERROUS SULFATE TAB 325 MG (65 MG ELEMENTAL FE) 325 MG: 325 (65 FE) TAB at 09:23

## 2025-02-10 RX ADMIN — Medication 10 ML: at 20:25

## 2025-02-10 RX ADMIN — TOBRAMYCIN 300 MG: 300 SOLUTION RESPIRATORY (INHALATION) at 07:13

## 2025-02-10 RX ADMIN — TOBRAMYCIN 300 MG: 300 SOLUTION RESPIRATORY (INHALATION) at 22:04

## 2025-02-10 RX ADMIN — NIFEDIPINE 30 MG: 30 TABLET, FILM COATED, EXTENDED RELEASE ORAL at 06:13

## 2025-02-10 RX ADMIN — CARVEDILOL 25 MG: 25 TABLET, FILM COATED ORAL at 20:24

## 2025-02-10 RX ADMIN — ASPIRIN 81 MG: 81 TABLET, COATED ORAL at 06:13

## 2025-02-10 RX ADMIN — GUAIFENESIN 600 MG: 600 TABLET ORAL at 09:23

## 2025-02-10 RX ADMIN — CARVEDILOL 25 MG: 25 TABLET, FILM COATED ORAL at 09:23

## 2025-02-10 RX ADMIN — INSULIN LISPRO 4 UNITS: 100 INJECTION, SOLUTION INTRAVENOUS; SUBCUTANEOUS at 17:36

## 2025-02-10 RX ADMIN — INSULIN LISPRO 6 UNITS: 100 INJECTION, SOLUTION INTRAVENOUS; SUBCUTANEOUS at 12:27

## 2025-02-10 RX ADMIN — SENNOSIDES AND DOCUSATE SODIUM 2 TABLET: 50; 8.6 TABLET ORAL at 09:23

## 2025-02-10 RX ADMIN — PIPERACILLIN AND TAZOBACTAM 4.5 G: 4; .5 INJECTION, POWDER, FOR SOLUTION INTRAVENOUS; PARENTERAL at 06:13

## 2025-02-10 RX ADMIN — SODIUM HYPOCHLORITE: 1.25 SOLUTION TOPICAL at 22:36

## 2025-02-10 RX ADMIN — POLYETHYLENE GLYCOL 3350 17 G: 17 POWDER, FOR SOLUTION ORAL at 09:22

## 2025-02-10 RX ADMIN — MONTELUKAST 10 MG: 10 TABLET, FILM COATED ORAL at 20:24

## 2025-02-10 RX ADMIN — ARFORMOTEROL TARTRATE 15 MCG: 15 SOLUTION RESPIRATORY (INHALATION) at 07:13

## 2025-02-10 RX ADMIN — Medication 10 ML: at 20:24

## 2025-02-10 RX ADMIN — BUMETANIDE 2 MG: 1 TABLET ORAL at 20:24

## 2025-02-10 RX ADMIN — INSULIN LISPRO 2 UNITS: 100 INJECTION, SOLUTION INTRAVENOUS; SUBCUTANEOUS at 20:25

## 2025-02-10 RX ADMIN — BUDESONIDE 0.5 MG: 0.5 INHALANT RESPIRATORY (INHALATION) at 07:13

## 2025-02-10 RX ADMIN — ATORVASTATIN CALCIUM 20 MG: 10 TABLET, FILM COATED ORAL at 09:22

## 2025-02-10 RX ADMIN — Medication 10 ML: at 09:24

## 2025-02-10 RX ADMIN — MAGNESIUM HYDROXIDE 10 ML: 2400 SUSPENSION ORAL at 09:22

## 2025-02-10 RX ADMIN — FINASTERIDE 5 MG: 5 TABLET, FILM COATED ORAL at 09:23

## 2025-02-10 RX ADMIN — INSULIN LISPRO 2 UNITS: 100 INJECTION, SOLUTION INTRAVENOUS; SUBCUTANEOUS at 09:22

## 2025-02-10 RX ADMIN — FAMOTIDINE 20 MG: 20 TABLET ORAL at 06:13

## 2025-02-10 NOTE — PROGRESS NOTES
[facility] Progress Note      Patient Name:  Preston Wallis   MRN:  [id number]   :  1965   Date of Admission:  2025   Date of Service:  2/10/2025   PMD:  Kimmy Riley MD     Hospital Course:     59 y.o. male past medical history of COPD, hypertension, CHF, history of MDRO presents to the ED due to shortness of breath. Patient was discharged on  for MDRO pseudomonas pneumonia on IV ertapenem.      Patient admitted for shortness of breath.  Chest x-ray shows chronic bilateral lung infiltrates.  CT was performed to arrival of the chest which demonstrates new spiculated left lower lobe nodule, bronchiectasis throughout both lungs, micronodule or peribronchial densities suggestive of atypical infection, right paratracheal lymph node and enlarging pericardial effusion.  Pulmonology and cardiology services consulted.  TTE demonstrates very small pericardial effusion only, estimated EF 49%, with some grade 1 diastolic dysfunction left ventricle.  Wound care noted necrotic tissue perianal region.  CT abdomen pelvis was performed, 4.5 cm x 1.8 cm x 3.5 cm posterior anal abscess noted.  General surgery consulted, patient underwent incision and drainage .  S/p bronchoscopy 2025, BAL grew MDRO Pseudomonas, he completed 7 days of IV meropenem and discontinued on MOIZ nebs.  Wound culture positive for Enterococcus VRE and MDRO Citrobacter, currently receiving IV linezolid and IV Zosyn.  General surgery following and planning diverting colostomy for wound healing.  Underwent laparoscopic diverting loop colostomy procedure along with primary repair of chronically incarcerated 3 cm umbilical hernia on 2025.  General surgery on board.     Interval Followup: Patient had clot with sanguinous output from his perianal wound, receiving wound care.  Colostomy site looks clean with minimal bloody output.  Hemoglobin 6 today, transfusing 3 units of PRBC.  Patient laying comfortably  in bed, no active complaints.  Leukocytosis improving.  Family at bedside, updated.       Consultants:    Neurosurgery  Pulmonology    Procedures:  Chest x-ray    Anti-infectives:    IV Zosyn    February 10, 2025    Chief Complaint: Patient with shortness of breath off and on since several days    Subjective:   Shortness of breath feels better today, seen by surgery had diverting colostomy    Review Of Systems:  GENERAL: Complains of weakness and fatigue no time is denies any weight loss appetite is normal.  HEENT:  Denies any rhinitis, no sore throat, no diplopia , hearing is normal  Respiratory: Complains of shortness of breath , no sweating d complaint yspnea on exertion , cough or sputum.  CVS:  Denies any chest pain , palpitation or syncope.  Gi:  No nausea, no vomiting, no diarrhea, no hematemesis , no melena , no rectal bleeding  :  No dysuria frequency , hematuria, no retention:  Musculoskeletal:  Denies any arthritis, or calf pain  Incision and drainage of posterior perianal abscess 2.          Sharp excisional debridement through skin and subcutaneous tissue in the posterior perianal area, 9 x 6 x 1 cm    Wound of gluteal cleft  Hematological:  No bleeding , no petechiae.  Skin:  Denies rash , pruritus , jaundice  Endocrine:  No polyuria , polydipsia , polyphagia.  CNS:  Denies any confusion , headache , or seizure disorder  Psychiatry:  No anxiety , or depression, no suicide ideation      Objective:  Vitals:    02/10/25 0725 02/10/25 1156 02/10/25 1210 02/10/25 1415   BP: 152/54 143/54     BP Location: Left arm Left arm     Patient Position: Lying Lying     Pulse: 81 74 78 81   Resp: 20 18     Temp: 98.9 °F (37.2 °C) 97.8 °F (36.6 °C)     TempSrc: Oral Oral     SpO2: 94% 94% 97% 95%   Weight:       Height:              Physical Exam:  GENERAL APPEARANCE: Alert and oriented.  Appears comfortable.  No acute distress  HEENT: Atraumatic, normocephalic,  PERRLA, mucous membranes moist  NECK: supple; no JVD  "or thyromegaly noted  CARDIOVASCULAR: Regular rate and rhythm, no murmurs appreciated; no edema present in BLE   RESPIRATORY: Harsh vesicular breathing with scattered rhonchi and crepitation.  GASTROINTESTINAL:  Abdomen  soft; bowel sounds present, non-tender, non-distended, no organomegaly, no CVA tenderness   EXTREMITIES: Pulses equal bilaterally   MUSCULOSKELETAL:  Good muscle strength noted no obvious deformity appreciate.          PSYCH: Appropriate mood and affect  Neurologic:  Alert & oriented x 3.  Normal Mental status.  Normal Cranial Nervies.  EOMI.  FABIO.  Normal 5/5 muscular strength in both upper and lower extremities.  Normal sensation.  Normal and symmetric reflexes. Normal cerbellar function.  Normal Gait. Negative Babinski.    Labs Reviewed  CBC:    Lab Results   Component Value Date    WBC 13.84 (H) 02/10/2025    HGB 9.1 (L) 02/10/2025    HCT 28.6 (L) 02/10/2025    MCV 89.4 02/10/2025     CMP:    Lab Results   Component Value Date     02/10/2025    CO2 30.8 (H) 02/10/2025    GLUCOSE 250 (H) 02/10/2025    BUN 58 (H) 02/10/2025    CREATININE 2.49 (H) 02/10/2025    ALBUMIN 2.8 (L) 02/10/2025    CALCIUM 8.5 (L) 02/10/2025    AST 17 02/10/2025    ALT 21 02/10/2025     Lipis Panel:   Lab Results   Component Value Date    CHOL 116 01/25/2025    HDL 54 01/25/2025      INR:    Lab Results   Component Value Date    INR 1.21 (H) 02/16/2024     Cardiac:    Lab Results   Component Value Date    CKMB 14.07 (H) 02/22/2024     No results found for: \"BNP\"  Lactate:    Lab Results   Component Value Date    LACTATE 1.1 02/10/2025    LACTATE 0.9 01/23/2025    LACTATE 0.69 04/29/2024     Blood Culture:  @lastlabx(cult:2)@    Imaging:  XR Chest 1 View    Result Date: 2/5/2025  Narrative: XR CHEST 1 VW Date of Exam: 2/5/2025 7:13 AM EST Indication: Pneumonia Comparison: Chest radiograph dated 1/23/2025, CT chest dated 1/24/2025 Findings: The patient is rotated. There is a right-sided chest port with tip " terminating at the upper SVC. There is a left-sided subclavian line with tip terminating also at the upper SVC. There is cardiomegaly. There is bilateral upper lobe airspace opacity corresponding to areas of known bronchiectasis seen on the prior examination. There is right basilar airspace disease. There are probable trace bilateral pleural effusions. No visible pneumothorax.     Impression: Impression: 1.Bilateral upper lobe airspace opacities corresponding to areas of bronchiectasis seen on the prior examination. 2.Right basilar airspace disease which may represent atelectasis or pneumonia. 3.Trace bilateral pleural effusions. 4.Cardiomegaly. Electronically Signed: Mynor Areli  2/5/2025 7:47 AM EST  Workstation ID: [id number]    Stress Test With Myocardial Perfusion One Day    Result Date: 1/27/2025  Narrative:   Left ventricular ejection fraction is mildly reduced (Calculated EF = 40%).   Low probability of ischemia during this study, patient may had an apical infarct versus apical thinning..   Findings consistent with an equivocal ECG stress test.     CT Abdomen Pelvis Without Contrast    Result Date: 1/25/2025  Narrative: CT ABDOMEN PELVIS WO CONTRAST Date of Exam: 1/25/2025 10:15 AM EST Indication: perirectal abscess. Comparison: None available. Technique: Axial CT images were obtained of the abdomen and pelvis without the administration of contrast. Reconstructed coronal and sagittal images were also obtained. Automated exposure control and iterative construction methods were used. Findings: LUNG BASES: Minimal basal atelectasis with trace effusions. Heart is enlarged. There is a 10 mm thickness pericardial effusion. LIVER:  Unremarkable parenchyma without focal lesion. BILIARY/GALLBLADDER: Cholecystectomy SPLEEN:  Unremarkable PANCREAS:  Unremarkable ADRENAL:  Unremarkable KIDNEYS: Mild bilateral renal cortical atrophy with no solid mass identified. No obstruction.  No calculus identified.  GASTROINTESTINAL/MESENTERY:  No evidence of obstruction nor inflammation.  No evidence of acute appendicitis. There is minimal right paracolic free fluid. AORTA/IVC:  Normal caliber. RETROPERITONEUM/LYMPH NODES:  Unremarkable REPRODUCTIVE:  Unremarkable BLADDER: Doyle catheter is present. OSSEUS STRUCTURES: No acute osseous process is identified. There is a posterior perianal abscess measuring 4.5 cm maximum AP dimension by 1.8 cm maximum transverse dimension by 3.5 cm in maximum CC dimension. This extends to the deep intergluteal crease skin surface. No intrapelvic extension noted.     Impression: Impression: 1.There is a posterior perianal abscess as detailed above. No intrapelvic extension. 2.Cardiomegaly with relatively small pericardial effusion. There is bibasilar atelectasis with trace pleural effusions. 3.Other incidental nonemergent findings as detailed above. Electronically Signed: Rob Milner MD  1/25/2025 10:55 AM EST  Workstation ID: [id number]    Adult Transthoracic Echo Complete W/ Cont if Necessary Per Protocol    Result Date: 1/24/2025  Narrative:   Left ventricular systolic function is low normal. Calculated left ventricular EF = 49.4%   Left ventricular wall thickness is consistent with moderate concentric hypertrophy.   Left ventricular diastolic function is consistent with (grade I) impaired relaxation.   There is a small (<1cm) pericardial effusion adjacent to the right ventricle.     CT Chest Without Contrast Diagnostic    Result Date: 1/24/2025  Narrative: CT CHEST WO CONTRAST DIAGNOSTIC Date of Exam: 1/24/2025 7:48 AM EST Indication: Shortness of breath.. Comparison: 10/22/2024 Technique: Axial CT images were obtained of the chest without contrast administration.  Reconstructed coronal and sagittal images were also obtained. Automated exposure control and iterative construction methods were used. Findings: There is a new somewhat spiculated nodular focus in the superior left lower lobe image  #66 of series 2. The rapid interval development suggests an infectious or inflammatory process. Short-term follow-up CT suggested. Stable appearance of verrucous and cystic bronchiectasis involving all lobes of the lungs, but greatest in the right upper lobe and lower lobes. There are again adjacent micronodular densities peribronchial distribution suggesting suggesting an infectious or inflammatory process. Nontuberculous mycobacterial infection or other atypical agent would be a top considerations. There is layering mucoid material in the trachea and right main bronchus. There is also probable mucoid impaction within areas of bronchiectasis noted. Small bilateral pleural effusions are noted, slightly increased from prior. There is consolidation in the lung bases bilaterally which may be due to atelectasis or additional infiltrates. Right subclavian Aokzfx-f-Lweg catheter again noted. Retained tubing from left subclavian Nivpos-o-Sqxx catheter again noted. There is an increasing pericardial effusion, measuring up to 2.6 cm posteriorly on the right. Enlarged right paratracheal lymph node is noted measuring up to 1.4 cm, likely reactive. Hilar adenopathy is also suspected, but not well characterized due to noncontrast technique. Thyroid is enlarged and heterogeneous which may be due to goitrous change. Limited imaging through the upper abdomen demonstrates postcholecystectomy change. The adrenals have a grossly normal morphology. There is degenerative change in the spine.     Impression: Impression: 1.There is a new somewhat spiculated nodular focus in the superior left lower lobe image #66 of series. The rapid interval development suggests an infectious or inflammatory process. 3-month follow-up CT recommended per Fleischner guidelines. 2.Stable appearance of verrucous and cystic bronchiectasis involving all lobes of the lungs, but greatest in the right upper lobe and lower lobes. There are again adjacent  micronodular densities in a peribronchial distribution suggesting an infectious or  inflammatory process. Nontuberculous mycobacterial infection or other atypical agent would be a top considerations. 3.Small bilateral pleural effusions, slightly increased from prior. There is consolidation in the lung bases bilaterally which may be due to atelectasis or additional infiltrates. 4.Increasing pericardial effusion, now measuring up to 2.6 cm posteriorly on the right. 5.Enlarged right paratracheal lymph node measuring up to 1.4 cm, likely reactive. Hilar adenopathy is also suspected, but not well characterized due to noncontrast technique. 6.Additional findings as given above. Electronically Signed: Deshawn Soto MD  1/24/2025 9:43 AM EST  Workstation ID: [id number]    XR Chest 1 View    Result Date: 1/23/2025  Narrative: XR CHEST 1 VW Date of Exam: 1/23/2025 4:47 PM EST Indication: SOA Triage Protocol Comparison: Chest AP dated 12/5/2024 Findings: There is an abandoned segment of a left subclavian central venous catheter measuring 17.3 cm in length. A right subclavian port infusion catheter terminates with the tip in the proximal superior vena cava. Patchy airspace opacities are again noted in the lung fields bilaterally, most predominantly in the mid to upper right lung field. Patient is rotated to the right. Heart size is favored to remain within normal limits. No definite effusion is seen.     Impression: Impression: Patchy chronic bilateral lung infiltrates. No acute infiltrate. Electronically Signed: Amado Salmeron MD  1/23/2025 5:11 PM EST  Workstation ID: [id number]    XR Chest 1 View    Result Date: 1/17/2025  Narrative: PORTABLE CHEST    1/17/2025 6:20 PM HISTORY: SHORTNESS OF BREATH COUGH COMPARISON: Multiple prior studies. FINDINGS: The heart is proper size. The mediastinum is unremarkable. Persistent diffuse airspace opacities in the upper and lower lung zones. Cystic bronchiectasis is redemonstrated. Small right  pleural effusion. There are unchanged support lines.    Impression: Persistent multifocal pneumonia. Images reviewed, interpreted, and dictated by Dr. Dhruv Duncan. Transcribed by Rosie Monsalve.    CT Chest Without Contrast Diagnostic    Result Date: 1/17/2025  Narrative: CT CHEST WITHOUT CONTRAST HISTORY: Shortness of air, cough COMPARISON: February 11, 2024 FINDINGS: Axial CT images of the chest were obtained without contrast. Sagittal and coronal reformatted images were also obtained and reviewed. This study was performed with techniques to keep radiation doses as low as reasonably achievable, (ALARA). Individualized dose reduction techniques using automated exposure control or adjustment of mA and/or kV according to the patient size were employed. Bilateral deep lines are present. Small mediastinal nodes are seen without evidence of adenopathy. A new small pericardial effusion is noted measuring up to 13 mm in thickness. No axillary mass or adenopathy is present. There is mild bilateral gynecomastia. Widespread cystic bronchiectasis is again seen involving both lungs. There has been significant interval improvement in the multifocal nodular opacities seen on the prior exam felt to represent multifocal pneumonia. Mild atelectasis is noted at the lung bases. A small right pleural effusion is seen. No chest wall abnormality is identified. Limited images of the upper abdomen reveal postoperative changes from cholecystectomy.    Impression: Stable widespread cystic bronchiectasis in both lungs. Improved multifocal pneumonia since the prior study. New small pericardial effusion. Images reviewed, interpreted, and dictated by Francois Hatch MD      Medications Reviewed:  arformoterol, 15 mcg, Nebulization, BID - RT  aspirin, 81 mg, Oral, QAM  atorvastatin, 20 mg, Oral, Daily  senna-docusate sodium, 2 tablet, Oral, BID   And  polyethylene glycol, 17 g, Oral, Daily   And  bisacodyl, 5 mg, Oral,  Daily  budesonide, 0.5 mg, Nebulization, BID  bumetanide, 2 mg, Oral, BID  busPIRone, 15 mg, Oral, BID  carvedilol, 25 mg, Oral, Q12H  [Held by provider] enoxaparin, 40 mg, Subcutaneous, Nightly  famotidine, 20 mg, Oral, QAM  ferrous sulfate, 325 mg, Oral, Daily With Breakfast  finasteride, 5 mg, Oral, Daily  guaiFENesin, 600 mg, Oral, Q12H  insulin lispro, 2-9 Units, Subcutaneous, 4x Daily AC & at Bedtime  magnesium hydroxide, 10 mL, Oral, Daily  montelukast, 10 mg, Oral, Nightly  NIFEdipine XL, 30 mg, Oral, QAM  piperacillin-tazobactam, 4.5 g, Intravenous, Q8H  revefenacin, 175 mcg, Nebulization, Daily - RT  sodium chloride, 10 mL, Intravenous, Q12H  sodium chloride, 10 mL, Intravenous, Q12H  Sodium Hypochlorite, , Topical, 2 times per day  tamsulosin, 0.4 mg, Oral, Daily  tobramycin PF, 300 mg, Nebulization, BID - RT         Assessment/Plan:      PNA (pneumonia)    COPD exacerbation    Bronchiectasis without complication    Pneumonia due to Pseudomonas species    Bronchiectasis with acute exacerbation    Bacterial pneumonia    Perianal abscess    1. Incision and drainage of posterior perianal abscess 2. Sharp excisional debridement through skin and subcutaneous tissue in the posterior perianal area, 9 x 6 x 1 cm    Wound of gluteal cleft    Medical decision making:  MDRO Pseudomonas pneumonia:  # Acute on chronic hypoxic respiratory failure  -Pulmonology following  -Supportive care  -Continue Zosyn and tobramycin nebs  -Supportive care  -Serial labs  -Status post bronchoscopy January 28 and February 3     # Perianal abscess  -Status post I&D January 26  -Status post diverting loop colostomy February 6  -Appreciate surgery recommendations  -Wound care  Surgery input appreciated     # Diabetes mellitus type 2  -Insulin sliding scale, monitor requirements and adjust as needed     # Paroxysmal atrial fibrillation:  -Eliquis currently on hold due to anemia  -Continue to monitor and add back as appropriate    DVT  Prophylaxis:    Lovenox    CODE STATUS:  Full code  Reason for continued hospitalization  and medical necessity :  Pseudomonas pneumonia and perianal abscess requiring further management    Orders:  CBC  CMP  IV Zosyn  Wound care        Time spent:  37+ minute not only  including face-to-face rounding putting in the orders writing the note and all the conversation reviewing the records    This transcription was electronically signed.         Disposition:  {plan; Home with self-care    Diet: ADA 1800      Discussed with: Patient and family      This has been electronically signed by:  _______________________________    Kuhshbu Abbasi [email or web address]     At [facility], we believe that sharing information builds trust and better relationships. You are receiving this note because you recently visited [facility]. It is possible you will see health information before a provider has talked with you about it. This kind of information can be easy to misunderstand. To help you fully understand what it means for your health, we urge you to discuss this note with your provider.           Part of this note may be an electronic transcription/translation of spoken language to printed ,text using the Dragon Dictation System.

## 2025-02-10 NOTE — PLAN OF CARE
Goal Outcome Evaluation:  Plan of Care Reviewed With: patient        Progress: improving  Outcome Evaluation: AO x4, Q2T, no complaints of pain, coccyx dressing changed per order, no acute events overnight

## 2025-02-10 NOTE — SIGNIFICANT NOTE
Wound Eval / Progress Noted    YAIMA Stockton     Patient Name: Preston Wallis  : 1965  MRN: [id number]  Today's Date: 2/10/2025                 Admit Date: 2025    Visit Dx:    ICD-10-CM ICD-9-CM   1. Pneumonia due to Pseudomonas species, unspecified laterality, unspecified part of lung  J15.1 482.1   2. Urinary tract infection associated with indwelling urethral catheter, initial encounter  T83.511A 996.64    N39.0 599.0   3. Perianal abscess  K61.0 566   4. COPD exacerbation  J44.1 491.21   5. Bronchiectasis without complication  J47.9 494.0   6. Allergy, initial encounter  T78.40XA 995.3   7. Acute hypoxic on chronic hypercapnic respiratory failure  J96.01 518.84    J96.12    8. Tobacco abuse, in remission  F17.201 305.1   9. Chronic dyspnea  R06.09 786.09   10. Obstructive sleep apnea  G47.33 327.23   11. Chronic respiratory failure with hypoxia and hypercapnia  J96.11 518.83    J96.12 799.02     786.09   12. Chronic obstructive pulmonary disease, unspecified COPD type  J44.9 496   13. Wound of gluteal cleft, unspecified laterality, subsequent encounter  S31.809D V58.89     877.0   14. Pneumonia of both lower lobes due to Pneumocystis jirovecii  B59 136.3   15. Bacterial pneumonia  J15.9 482.9   16. Bronchiectasis with acute exacerbation  J47.1 494.1   17. Pneumonia of both lungs due to Pseudomonas species, unspecified part of lung  J15.1 482.1   18. Difficulty walking  R26.2 719.7         PNA (pneumonia)    COPD exacerbation    Bronchiectasis without complication    Pneumonia due to Pseudomonas species    Bronchiectasis with acute exacerbation    Bacterial pneumonia    Perianal abscess    1. Incision and drainage of posterior perianal abscess 2. Sharp excisional debridement through skin and subcutaneous tissue in the posterior perianal area, 9 x 6 x 1 cm    Wound of gluteal cleft        Past Medical History:   Diagnosis Date    1. Incision and drainage of posterior perianal abscess 2. Sharp excisional  "debridement through skin and subcutaneous tissue in the posterior perianal area, 9 x 6 x 1 cm 01/26/2025    Age-related cognitive decline     Allergic contact dermatitis     Allergies     Anemia     Bedbound     11/2023 \"MY LEG MUSCLES STOPPED WORKING\"    Bronchiectasis with acute lower respiratory infection     Charcot foot due to diabetes mellitus 09/10/2013    Chronic diastolic (congestive) heart failure     Chronic kidney disease     Chronic respiratory failure with hypoxia     Closed supracondylar fracture of femur 01/12/2022    COPD (chronic obstructive pulmonary disease)     Deep vein thrombosis (DVT) of lower extremity associated with air travel 01/13/2023    Dependence on supplemental oxygen     Eczema     Erectile dysfunction     due to organic reasons    Essential (primary) hypertension     Fracture     closed fracture of other tarsal and metatarsal bones    Fracture of proximal humerus 01/13/2023    GERD without esophagitis     High risk medication use     Hypercholesteremia     Hypomagnesemia     Infected stasis ulcer of left lower extremity 01/13/2023    Insomnia     Low back pain     Major depressive disorder     Morbid (severe) obesity due to excess calories     MRSA pneumonia     Muscle weakness     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 without evidence of necrosis     Obstructive sleep apnea (adult) (pediatric)     On home O2     REPORTS WEARING 2L/NC AAT    Other forms of dyspnea     Other long term (current) drug therapy     Other specified noninfective gastroenteritis and colitis     Other spondylosis, lumbar region     Pain in both knees     Paroxysmal atrial fibrillation     Peripheral neuropathy     attributed to type 2 diabetes    Pneumonia, unspecified organism     Polyneuropathy     Rash and other nonspecific skin eruption     Self-catheterizes urinary bladder     EVERY 4 HOURS    Smoking     \"SOMETIMES\"    Syncope and collapse     Tachycardia  "    Tinnitus 01/13/2023    Type 1 diabetes mellitus with diabetic chronic kidney disease     Type 2 diabetes mellitus     Unspecified fall, initial encounter     Urinary retention         Past Surgical History:   Procedure Laterality Date    BRONCHOSCOPY N/A 1/28/2025    Procedure: BRONCHOSCOPY: BAL: insertion of lighted instrument to view inside the lung;  Surgeon: Walter Nicole DO;  Location: [facility] MAIN OR;  Service: Pulmonary;  Laterality: N/A;    BRONCHOSCOPY N/A 2/3/2025    Procedure: BRONCHOSCOPY WITH BRONCHOALVEOLAR LAVAGE, POSSIBLE BIOPSY, BRUSHING, WASHING, AIRWAY INSPECTION: insertion of lighted instrument to view inside the lung;  Surgeon: Chadd Swan MD;  Location: [facility] MAIN OR;  Service: Pulmonary;  Laterality: N/A;    CARDIAC CATHETERIZATION Left 8/15/2024    Procedure: Carbon dioxide aortogram with left leg angiogram, possible angioplasty or stenting;  Surgeon: Moshe Willson MD;  Location: [facility] CATH INVASIVE LOCATION;  Service: Vascular;  Laterality: Left;    CHOLECYSTECTOMY      COLOSTOMY N/A 2/6/2025    Procedure: COLOSTOMY LAPAROSCOPIC; plain text: creation of colostomy using small incisions;  Surgeon: Emerson Canada MD;  Location: [facility] OR [facility];  Service: General;  Laterality: N/A;    CYSTOSCOPY      FEMUR SURGERY Left     Shravan placed    INCISION AND DRAINAGE ABSCESS N/A 1/26/2025    Procedure: INCISION AND DRAINAGE ABSCESS; plain text: incision and drain pus from buttocks wound;  Surgeon: Emerson Canada MD;  Location: [facility] OR [facility];  Service: General;  Laterality: N/A;    KNEE SURGERY Left     OTHER SURGICAL HISTORY Left     venous port, REMOVED    PORTACATH PLACEMENT Right     TIBIAL PLATEAU OPEN REDUCTION INTERNAL FIXATION Left 12/22/2023    Procedure: TIBIAL PLATEAU OPEN REDUCTION INTERNAL FIXATION;  Surgeon: Hugo Kline MD;  Location: [facility];  Service: Orthopedics;  Laterality: Left;    TONSILLECTOMY AND ADENOIDECTOMY           Physical  "Assessment:   02/10/25 1100   Colostomy   Placement date: If unknown, DO NOT use \"Add Comment\" note/Placement time: If unknown, DO NOT use \"Add Comment\" note: 02/06/25 1322   Hand Hygiene Completed: Yes   Wound Image      Stomal Appliance 1 piece;Clean;Dry;Intact;Drainable;Changed   Stoma Appearance irregular;moist;dusky;protruding above skin level;bridge in place   Peristomal Assessment Pink;Other (Comment)  (ecchymosis with two areas of epidermal tissue loss near 3 to 4 o'clock with moist red tissue)   Accessories/Skin Care cleansed with water;skin sealant;other (see comments)  (hydrocolloid to peristomal tissue)   Stoma Function flatus;stool   Stool Color brown   Stool Consistency soft   Treatment Site care;Bag change;Placement checked   Ostomy Measures: 45mm x 50mm    Wound Check / Follow-up:  Patient seen today for ostomy education and pouching system change. Patient underwent a diverting laprascopic colostomy placement on 2/6/25 to assist with healing of his perianal abscess. Patient reports he has been bedridden for approximately 2 to 3 years due to the pressure injury to his left heel. Patient's wife is present at bedside and identifies as the designated support person. Reviewed education with patient and patient's wife to include managing ostomy, diet management with ostomy, when to perform a pouch change, how to perform a pouch change, and that the goal is for patient's or patient's wife to be able to change the pouching system independently prior to discharge; questions encouraged and answered. Patient reports the current plan is for him to receive home health when he returns home, discussed with case management.     Loop colostomy noted to lower aspect of abdomen. Pouching system is intact with no signs of lifting or leaking. Colostomy pouching system emptied per PCA prior to assessment. Patient's wife removed existing pouch with adhesive remover spray. Stoma is noted to be irregular, moist, and " protruding above skin level. Stoma is dusky and dark red, with moist red tissue noted at base of ostomy from 4 to 8 o'clock. An ostomy bar is in place and sutures are intermittently visible around stoma. Peristomal tissue presents with pink coloration and ecchymosis, with two areas of epidermal tissue loss around 3 to 4 o'clock with pink and red moist tissue. Stoma and peristomal tissue were lightly cleansed with warm water and washcloth per patient's wife, with assistance per wound care RN. Peristomal tissue was blotted dry. Patient's wife applied skin prep around stoma to assist with seal. A hydrocolloid dressing was cut and applied around stoma to assist with peristomal protection. Patient's wife cut to fit a new one piece Coloplast flat pouch. Wound care RN applied pouching system to prepped site. Seal obtained. No signs of lifting or leaking noted. Recommending to continue diligent ostomy care. Education provided throughout ostomy pouching system change.      Impression: Loop Colostomy.     Short term goals: Regain skin integrity, skin protection, colostomy management.    Amparo Novoa, LEN    2/10/2025    18:03 EST

## 2025-02-10 NOTE — PROGRESS NOTES
Pulmonary / Critical Care Progress Note      Patient Name: Preston Wallis  : 1965  MRN: [id number]  Primary Care Physician:  Kimmy Riley MD  Date of admission: 2025    Subjective   Subjective   Follow-up for acute on chronic respiratory failure hypoxic    No acute events overnight.    This morning,  Resting in bed  Overall feeling better  Dyspnea improved  Remains on 2 L NC  Tolerating [place]  Tolerating diet  Remains weak and fatigued      Objective   Objective     Vitals:   Temp:  [96.2 °F (35.7 °C)-98.9 °F (37.2 °C)] 98.9 °F (37.2 °C)  Heart Rate:  [79-90] 81  Resp:  [16-20] 20  BP: (140-158)/(50-77) 152/54  Flow (L/min) (Oxygen Therapy):  [2] 2    Physical Exam   Vital Signs Reviewed   General: Chronically ill-appearing, obese male, Alert, NAD, lying in bed  Chest: Diminished bilaterally with scattered rhonchi, no work of breathing noted on baseline 2 L NC  CV: RRR, no MGR, pulses 2+, equal.  Abd:  Soft, appropriately tender, ND, colostomy bag in place  EXT:  no clubbing, no cyanosis, no edema  Neuro:  A&Ox3, CN grossly intact, no focal deficits.  Skin: No rashes or lesions noted      Result Review    Result Review:  I have personally reviewed the results from the time of this admission to 2/10/2025 08:37 EST and agree with these findings:  [x]  Laboratory  [x]  Microbiology  [x]  Radiology  []  EKG/Telemetry   []  Cardiology/Vascular   []  Pathology  []  Old records  []  Other:  Most notable findings include:        Lab 02/10/25  0411 25  0346 25  0548 25  0547 25  2047 25  1139 25  0500 25  2336 25  0530 25  0612 25  0513   WBC 13.84* 14.76* 13.33*  --   --  15.00* 18.11* 22.49* 12.92* 10.88* 10.10   HEMOGLOBIN 9.1* 8.5* 9.2*  --  9.0* 7.1* 6.0* 7.0* 8.2* 8.3* 8.0*   HEMATOCRIT 28.6* 26.9* 29.3*  --  27.9* 22.6* 19.3* 22.6* 25.8* 26.7* 25.8*   PLATELETS 229 192 173  --   --  176 190 196 212 210 228   SODIUM 138 135*  --  135*  --    --  135*  --  134* 136 143   POTASSIUM 4.3 3.9  --  4.0  --   --  4.6  --  4.4 4.1 3.9   CHLORIDE 98 96*  --  97*  --   --  95*  --  93* 94* 98   CO2 30.6* 29.2*  --  28.4  --   --  31.8*  --  34.6* 36.7* 38.1*   BUN 63* 58*  --  53*  --   --  55*  --  47* 43* 44*   CREATININE 2.70* 2.58*  --  2.58*  --   --  2.29*  --  2.28* 2.09* 2.06*   GLUCOSE 181* 162*  --  174*  --   --  229*  --  168* 220* 92   CALCIUM 8.4* 8.3*  --  8.2*  --   --  8.5*  --  8.8 8.7 8.4*   PHOSPHORUS 2.6 2.7  --  3.4  --   --  4.2  --  2.7  --   --      CT Chest Without Contrast Diagnostic    Result Date: 1/24/2025  CT CHEST WO CONTRAST DIAGNOSTIC Date of Exam: 1/24/2025 7:48 AM EST Indication: Shortness of breath.. Comparison: 10/22/2024 Technique: Axial CT images were obtained of the chest without contrast administration.  Reconstructed coronal and sagittal images were also obtained. Automated exposure control and iterative construction methods were used. Findings: There is a new somewhat spiculated nodular focus in the superior left lower lobe image #66 of series 2. The rapid interval development suggests an infectious or inflammatory process. Short-term follow-up CT suggested. Stable appearance of verrucous and cystic bronchiectasis involving all lobes of the lungs, but greatest in the right upper lobe and lower lobes. There are again adjacent micronodular densities peribronchial distribution suggesting suggesting an infectious or inflammatory process. Nontuberculous mycobacterial infection or other atypical agent would be a top considerations. There is layering mucoid material in the trachea and right main bronchus. There is also probable mucoid impaction within areas of bronchiectasis noted. Small bilateral pleural effusions are noted, slightly increased from prior. There is consolidation in the lung bases bilaterally which may be due to atelectasis or additional infiltrates. Right subclavian Qysvhj-i-Beyz catheter again noted. Retained  tubing from left subclavian Yckirg-s-Hqiv catheter again noted. There is an increasing pericardial effusion, measuring up to 2.6 cm posteriorly on the right. Enlarged right paratracheal lymph node is noted measuring up to 1.4 cm, likely reactive. Hilar adenopathy is also suspected, but not well characterized due to noncontrast technique. Thyroid is enlarged and heterogeneous which may be due to goitrous change. Limited imaging through the upper abdomen demonstrates postcholecystectomy change. The adrenals have a grossly normal morphology. There is degenerative change in the spine.     Impression: Impression: 1.There is a new somewhat spiculated nodular focus in the superior left lower lobe image #66 of series. The rapid interval development suggests an infectious or inflammatory process. 3-month follow-up CT recommended per Fleischner guidelines. 2.Stable appearance of verrucous and cystic bronchiectasis involving all lobes of the lungs, but greatest in the right upper lobe and lower lobes. There are again adjacent micronodular densities in a peribronchial distribution suggesting an infectious or  inflammatory process. Nontuberculous mycobacterial infection or other atypical agent would be a top considerations. 3.Small bilateral pleural effusions, slightly increased from prior. There is consolidation in the lung bases bilaterally which may be due to atelectasis or additional infiltrates. 4.Increasing pericardial effusion, now measuring up to 2.6 cm posteriorly on the right. 5.Enlarged right paratracheal lymph node measuring up to 1.4 cm, likely reactive. Hilar adenopathy is also suspected, but not well characterized due to noncontrast technique. 6.Additional findings as given above. Electronically Signed: Deshawn Soto MD  1/24/2025 9:43 AM EST  Workstation ID: [id number]     Most recent chest x-ray with by lateral upper lobe airspace disease consistent bronchiectasis and trace bilateral effusions  Sputum culture with  multidrug-resistant Pseudomonas      Assessment & Plan   Assessment / Plan     Active Hospital Problems:  Active Hospital Problems    Diagnosis     **PNA (pneumonia)     1. Incision and drainage of posterior perianal abscess 2. Sharp excisional debridement through skin and subcutaneous tissue in the posterior perianal area, 9 x 6 x 1 cm     Perianal abscess     Wound of gluteal cleft     Bacterial pneumonia     Bronchiectasis with acute exacerbation     Pneumonia due to Pseudomonas species     Bronchiectasis without complication     COPD exacerbation      Impression:  Acute on chronic hypoxemic respiratory failure  Multidrug-resistant Pseudomonas pneumonia  Bronchiectasis with acute exacerbation  Acute exacerbation of COPD  Paroxysmal atrial fibrillation  Perianal abscess status post drainage  Status post diverting colostomy    Plan:  Wean O2 to keep SPO2 greater than 90%.  Wears 2 L of oxygen at baseline  Continue NIPPV nightly with naps on current settings  Status post bronchoscopy with clearance of airways.  Positive for MDRO Pseudomonas infection  Continue 7 days of Zosyn per sensitivities for multidrug-resistant Pseudomonas pneumonia  Continue MOIZ nebs and cycle off/on in 30-day intervals for 6 months  Continue Brovana, Pulmicort and papillary  Continue oral diuretics to Bumex  Trend renal panels and electrolytes  Encourage activity and incentive spirometer use  Eliquis on hold for anemia.  Transfuse for hemoglobin less than 7  Patient would greatly benefit from rehab, however he refuses    VTE Prophylaxis:  Pharmacologic & mechanical VTE prophylaxis orders are present.    CODE STATUS:   Code Status (Patient has no pulse and is not breathing): CPR (Attempt to Resuscitate)  Medical Interventions (Patient has pulse or is breathing): Full Support      Labs, imaging, microbiology, notes and medications personally reviewed  Discussed with primary    I, Dr. Severiano Carolina, have spent more than 50% of the total time  managing the patient in this encounter today.  This included personally reviewing all pertinent labs, imaging, microbiology and documentation. Also discussing the case with the patient and any available family, the admitting physician and any available ancillary staff.    Electronically signed by CON Camilo, 02/10/25, 1:12 PM EST.  Electronically signed by Severiano Carolina MD, 02/10/25, 2:15 PM EST.

## 2025-02-10 NOTE — PLAN OF CARE
Goal Outcome Evaluation:      VSS. Pain medication x1. Dressing changes w/ no complications or bleeding. Ostomy functioning well.

## 2025-02-10 NOTE — PROGRESS NOTES
"POST OP PROGRESS NOTE     Patient Name:  Preston Wallis  YOB: 1965  [id number]   LOS: 18 days   4 Days Post-Op            Subjective     Interval History:   VSS, afebrile, having ostomy output    Review of Systems:    A complete review of systems was performed and all are negative except what is documented in the HPI.       Objective     Constitutional:  well nourished, no acute distress, appears stated age /54 (BP Location: Left arm, Patient Position: Lying)   Pulse 74   Temp 97.8 °F (36.6 °C) (Oral)   Resp 18   Ht 175.3 cm (69\")   Wt 111 kg (244 lb 4.3 oz)   SpO2 94%   BMI 36.07 kg/m²    Eyes:  anicteric sclerae, moist conjunctivae, no lid lag, PERRLA  ENMT:  oropharynx clear, moist mucous membranes  Neck:   full ROM, trachea midline  Cardiovascular: RRR, S1 and S2 present, no MRG, heart rate 74, no pedal edema  Respiratory: lungs CTA, respirations even and unlabored  GI:  Abdomen soft, nontender, nondistended, stoma congested, having liquid stool output     Skin:  warm and dry, normal turgor, no rashes  Psychiatric:  alert and oriented x 4, intact judgment and insight, cooperative          Results Review:       I reviewed the patient's new clinical results including  CBC, BMP.     WBC   Date Value Ref Range Status   02/10/2025 13.84 (H) 3.40 - 10.80 10*3/mm3 Final     RBC   Date Value Ref Range Status   02/10/2025 3.20 (L) 4.14 - 5.80 10*6/mm3 Final     Hemoglobin   Date Value Ref Range Status   02/10/2025 9.1 (L) 13.0 - 17.7 g/dL Final     Hematocrit   Date Value Ref Range Status   02/10/2025 28.6 (L) 37.5 - 51.0 % Final     MCV   Date Value Ref Range Status   02/10/2025 89.4 79.0 - 97.0 fL Final     MCH   Date Value Ref Range Status   02/10/2025 28.4 26.6 - 33.0 pg Final     MCHC   Date Value Ref Range Status   02/10/2025 31.8 31.5 - 35.7 g/dL Final     RDW   Date Value Ref Range Status   02/10/2025 14.6 12.3 - 15.4 % Final     RDW-SD   Date Value Ref Range Status   02/10/2025 46.8 " 37.0 - 54.0 fl Final     MPV   Date Value Ref Range Status   02/10/2025 8.8 6.0 - 12.0 fL Final     Platelets   Date Value Ref Range Status   02/10/2025 229 140 - 450 10*3/mm3 Final     Neutrophil %   Date Value Ref Range Status   02/10/2025 71.6 42.7 - 76.0 % Final     Lymphocyte %   Date Value Ref Range Status   02/10/2025 13.8 (L) 19.6 - 45.3 % Final     Monocyte %   Date Value Ref Range Status   02/10/2025 7.9 5.0 - 12.0 % Final     Eosinophil %   Date Value Ref Range Status   02/10/2025 5.5 0.3 - 6.2 % Final     Basophil %   Date Value Ref Range Status   02/10/2025 0.5 0.0 - 1.5 % Final     Immature Grans %   Date Value Ref Range Status   02/10/2025 0.7 (H) 0.0 - 0.5 % Final     Neutrophils, Absolute   Date Value Ref Range Status   02/10/2025 9.91 (H) 1.70 - 7.00 10*3/mm3 Final     Lymphocytes, Absolute   Date Value Ref Range Status   02/10/2025 1.91 0.70 - 3.10 10*3/mm3 Final     Monocytes, Absolute   Date Value Ref Range Status   02/10/2025 1.10 (H) 0.10 - 0.90 10*3/mm3 Final     Eosinophils, Absolute   Date Value Ref Range Status   02/10/2025 0.76 (H) 0.00 - 0.40 10*3/mm3 Final     Basophils, Absolute   Date Value Ref Range Status   02/10/2025 0.07 0.00 - 0.20 10*3/mm3 Final     Immature Grans, Absolute   Date Value Ref Range Status   02/10/2025 0.09 (H) 0.00 - 0.05 10*3/mm3 Final     nRBC   Date Value Ref Range Status   02/10/2025 0.0 0.0 - 0.2 /100 WBC Final         Basic Metabolic Panel    Sodium Sodium   Date Value Ref Range Status   02/10/2025 138 136 - 145 mmol/L Final   02/10/2025 138 136 - 145 mmol/L Final   02/09/2025 135 (L) 136 - 145 mmol/L Final   02/08/2025 135 (L) 136 - 145 mmol/L Final      Potassium Potassium   Date Value Ref Range Status   02/10/2025 4.1 3.5 - 5.2 mmol/L Final   02/10/2025 4.3 3.5 - 5.2 mmol/L Final   02/09/2025 3.9 3.5 - 5.2 mmol/L Final   02/08/2025 4.0 3.5 - 5.2 mmol/L Final      Chloride Chloride   Date Value Ref Range Status   02/10/2025 96 (L) 98 - 107 mmol/L Final  "  02/10/2025 98 98 - 107 mmol/L Final   02/09/2025 96 (L) 98 - 107 mmol/L Final   02/08/2025 97 (L) 98 - 107 mmol/L Final      Bicarbonate No results found for: \"PLASMABICARB\"   BUN BUN   Date Value Ref Range Status   02/10/2025 58 (H) 6 - 20 mg/dL Final   02/10/2025 63 (H) 6 - 20 mg/dL Final   02/09/2025 58 (H) 6 - 20 mg/dL Final   02/08/2025 53 (H) 6 - 20 mg/dL Final      Creatinine Creatinine   Date Value Ref Range Status   02/10/2025 2.49 (H) 0.76 - 1.27 mg/dL Final   02/10/2025 2.70 (H) 0.76 - 1.27 mg/dL Final   02/09/2025 2.58 (H) 0.76 - 1.27 mg/dL Final   02/08/2025 2.58 (H) 0.76 - 1.27 mg/dL Final      Calcium Calcium   Date Value Ref Range Status   02/10/2025 8.5 (L) 8.6 - 10.5 mg/dL Final   02/10/2025 8.4 (L) 8.6 - 10.5 mg/dL Final   02/09/2025 8.3 (L) 8.6 - 10.5 mg/dL Final   02/08/2025 8.2 (L) 8.6 - 10.5 mg/dL Final      Glucose      No components found for: \"GLUCOSE.*\"       Lab Results   Component Value Date    GLUCOSE 250 (H) 02/10/2025    BUN 58 (H) 02/10/2025    CREATININE 2.49 (H) 02/10/2025     02/10/2025    K 4.1 02/10/2025    CL 96 (L) 02/10/2025    CALCIUM 8.5 (L) 02/10/2025    PROTEINTOT 6.3 02/10/2025    ALBUMIN 2.8 (L) 02/10/2025    ALT 21 02/10/2025    AST 17 02/10/2025    ALKPHOS 127 (H) 02/10/2025    BILITOT 0.4 02/10/2025    GLOB 3.5 02/10/2025    AGRATIO 0.8 02/10/2025    BCR 23.3 02/10/2025    ANIONGAP 11.2 02/10/2025    EGFR 29.0 (L) 02/10/2025       IMAGING:  Imaging Results (Last 72 Hours)       ** No results found for the last 72 hours. **            Medications:    Current Facility-Administered Medications:     arformoterol (BROVANA) nebulizer solution 15 mcg, 15 mcg, Nebulization, BID - RT, Emerson Canada MD, 15 mcg at 02/10/25 0713    aspirin EC tablet 81 mg, 81 mg, Oral, QAM, Emerson Canada MD, 81 mg at 02/10/25 0613    atorvastatin (LIPITOR) tablet 20 mg, 20 mg, Oral, Daily, Emerson Canada MD, 20 mg at 02/10/25 0922    sennosides-docusate (PERICOLACE) 8.6-50 MG per " tablet 2 tablet, 2 tablet, Oral, BID, 2 tablet at 02/10/25 0923 **AND** polyethylene glycol (MIRALAX) packet 17 g, 17 g, Oral, Daily, 17 g at 02/10/25 0922 **AND** bisacodyl (DULCOLAX) EC tablet 5 mg, 5 mg, Oral, Daily, 5 mg at 02/08/25 0844 **AND** bisacodyl (DULCOLAX) suppository 10 mg, 10 mg, Rectal, Daily PRN, Emerson Canada MD    budesonide (PULMICORT) nebulizer solution 0.5 mg, 0.5 mg, Nebulization, BID, Emerson Canada MD, 0.5 mg at 02/10/25 0713    bumetanide (BUMEX) tablet 2 mg, 2 mg, Oral, BID, Emerson Canada MD, 2 mg at 02/10/25 0923    busPIRone (BUSPAR) tablet 15 mg, 15 mg, Oral, BID, Emerson Canada MD, 15 mg at 02/10/25 0923    carvedilol (COREG) tablet 25 mg, 25 mg, Oral, Q12H, Emerson Canada MD, 25 mg at 02/10/25 0923    dextrose (D50W) (25 g/50 mL) IV injection 25 g, 25 g, Intravenous, Q15 Min PRN, Emerson Canada MD    dextrose (GLUTOSE) oral gel 15 g, 15 g, Oral, Q15 Min PRN, Emerson Canada MD    [Held by provider] Enoxaparin Sodium (LOVENOX) syringe 40 mg, 40 mg, Subcutaneous, Nightly, Emerson Canada MD, 40 mg at 02/06/25 2202    famotidine (PEPCID) tablet 20 mg, 20 mg, Oral, QAM, Emerson Canada MD, 20 mg at 02/10/25 0613    ferrous sulfate tablet 325 mg, 325 mg, Oral, Daily With Breakfast, Emerson Canada MD, 325 mg at 02/10/25 0923    finasteride (PROSCAR) tablet 5 mg, 5 mg, Oral, Daily, Emerson Canada MD, 5 mg at 02/10/25 0923    glucagon (GLUCAGEN) injection 1 mg, 1 mg, Intramuscular, Q15 Min PRN, Emerson Canada MD    guaiFENesin (MUCINEX) 12 hr tablet 600 mg, 600 mg, Oral, Q12H, Emerson Canada MD, 600 mg at 02/10/25 0923    heparin injection 500 Units, 5 mL, Intravenous, PRN, Emerson Canada MD    HYDROcodone-acetaminophen (NORCO) 5-325 MG per tablet 1 tablet, 1 tablet, Oral, Q6H PRN, Miranda Enriquez MD, 1 tablet at 02/09/25 0437    Insulin Lispro (humaLOG) injection 2-9 Units, 2-9 Units, Subcutaneous, 4x Daily AC & at Bedtime, Emerson Canada MD, 6 Units at  02/10/25 1227    ipratropium-albuterol (DUO-NEB) nebulizer solution 3 mL, 3 mL, Nebulization, Q4H PRN, Emerson Canada MD, 3 mL at 02/03/25 0250    magnesium hydroxide (MILK OF MAGNESIA) suspension 10 mL, 10 mL, Oral, Daily, Emerson Canada MD, 10 mL at 02/10/25 0922    montelukast (SINGULAIR) tablet 10 mg, 10 mg, Oral, Nightly, Emerson Canada MD, 10 mg at 02/09/25 2026    morphine injection 2 mg, 2 mg, Intravenous, Q4H PRN, Miranda Enriquez MD, 2 mg at 02/06/25 1707    NIFEdipine XL (PROCARDIA XL) 24 hr tablet 30 mg, 30 mg, Oral, QAM, Emerson Canada MD, 30 mg at 02/10/25 0613    nitroglycerin (NITROSTAT) SL tablet 0.4 mg, 0.4 mg, Sublingual, Q5 Min PRN, Emerson Canada MD    piperacillin-tazobactam (ZOSYN) IVPB 4.5 g IVPB in 100 mL NS (VTB), 4.5 g, Intravenous, Q8H, Miranda Enriquez MD, 4.5 g at 02/10/25 0613    revefenacin (YUPELRI) nebulizer solution 175 mcg, 175 mcg, Nebulization, Daily - RT, Emerson Canada MD, 175 mcg at 02/10/25 0713    simethicone (MYLICON) chewable tablet 80 mg, 80 mg, Oral, 4x Daily PRN, Emerson Canada MD, 80 mg at 02/04/25 1736    sodium chloride 0.9 % flush 10 mL, 10 mL, Intravenous, PRN, Emerson Canada MD, 10 mL at 01/24/25 1849    sodium chloride 0.9 % flush 10 mL, 10 mL, Intravenous, Q12H, Emerson Canada MD, 10 mL at 02/10/25 0924    sodium chloride 0.9 % flush 10 mL, 10 mL, Intravenous, PRN, Emerson Canada MD    sodium chloride 0.9 % flush 10 mL, 10 mL, Intravenous, Q12H, Emerson Canada MD, 10 mL at 02/10/25 0924    sodium chloride 0.9 % flush 10 mL, 10 mL, Intravenous, Dread FULLER Jeffrey, MD    sodium chloride 0.9 % flush 20 mL, 20 mL, Intravenous, Dread FULLER Jeffrey, MD    sodium chloride 0.9 % infusion 40 mL, 40 mL, Intravenous, Dread FULLER Jeffrey, MD    sodium chloride 0.9 % infusion 40 mL, 40 mL, Intravenous, Dread FULLER Jeffrey, MD    Sodium Hypochlorite (DAKIN'S) 0.125 % topical solution 0.125% (quarter strength), , Topical, 2 times per day, Dread  MD Emerson, Given at 02/10/25 1227    tamsulosin (FLOMAX) 24 hr capsule 0.4 mg, 0.4 mg, Oral, Daily, Emerson Canada MD, 0.4 mg at 02/10/25 0923    tobramycin PF (MOIZ) nebulizer solution 300 mg, 300 mg, Nebulization, BID - RT, Emerson Canada MD, 300 mg at 02/10/25 0713    Assessment & Plan       PNA (pneumonia)    COPD exacerbation    Bronchiectasis without complication    Pneumonia due to Pseudomonas species    Bronchiectasis with acute exacerbation    Bacterial pneumonia    Perianal abscess    1. Incision and drainage of posterior perianal abscess 2. Sharp excisional debridement through skin and subcutaneous tissue in the posterior perianal area, 9 x 6 x 1 cm    Wound of gluteal cleft    S/P diverting colostomy   Continue colostomy teaching          Electronically signed by CON Cruz, 02/10/25, 12:50 PM EST.

## 2025-02-11 LAB
ANION GAP SERPL CALCULATED.3IONS-SCNC: 11.5 MMOL/L (ref 5–15)
BACTERIA UR QL AUTO: ABNORMAL /HPF
BASOPHILS # BLD AUTO: 0.06 10*3/MM3 (ref 0–0.2)
BASOPHILS NFR BLD AUTO: 0.4 % (ref 0–1.5)
BILIRUB UR QL STRIP: NEGATIVE
BUN SERPL-MCNC: 60 MG/DL (ref 6–20)
BUN/CREAT SERPL: 23.6 (ref 7–25)
CALCIUM SPEC-SCNC: 8.7 MG/DL (ref 8.6–10.5)
CHLORIDE SERPL-SCNC: 98 MMOL/L (ref 98–107)
CLARITY UR: CLEAR
CO2 SERPL-SCNC: 29.5 MMOL/L (ref 22–29)
COLOR UR: YELLOW
CREAT SERPL-MCNC: 2.54 MG/DL (ref 0.76–1.27)
CREAT UR-MCNC: 14.2 MG/DL
DEPRECATED RDW RBC AUTO: 47.6 FL (ref 37–54)
EGFRCR SERPLBLD CKD-EPI 2021: 28.3 ML/MIN/1.73
EOSINOPHIL # BLD AUTO: 0.8 10*3/MM3 (ref 0–0.4)
EOSINOPHIL NFR BLD AUTO: 6 % (ref 0.3–6.2)
ERYTHROCYTE [DISTWIDTH] IN BLOOD BY AUTOMATED COUNT: 15 % (ref 12.3–15.4)
GLUCOSE BLDC GLUCOMTR-MCNC: 128 MG/DL (ref 70–99)
GLUCOSE BLDC GLUCOMTR-MCNC: 151 MG/DL (ref 70–99)
GLUCOSE BLDC GLUCOMTR-MCNC: 204 MG/DL (ref 70–99)
GLUCOSE BLDC GLUCOMTR-MCNC: 232 MG/DL (ref 70–99)
GLUCOSE SERPL-MCNC: 137 MG/DL (ref 65–99)
GLUCOSE UR STRIP-MCNC: ABNORMAL MG/DL
HCT VFR BLD AUTO: 28.2 % (ref 37.5–51)
HGB BLD-MCNC: 9 G/DL (ref 13–17.7)
HGB UR QL STRIP.AUTO: ABNORMAL
HYALINE CASTS UR QL AUTO: ABNORMAL /LPF
IMM GRANULOCYTES # BLD AUTO: 0.09 10*3/MM3 (ref 0–0.05)
IMM GRANULOCYTES NFR BLD AUTO: 0.7 % (ref 0–0.5)
KETONES UR QL STRIP: NEGATIVE
LEUKOCYTE ESTERASE UR QL STRIP.AUTO: ABNORMAL
LYMPHOCYTES # BLD AUTO: 2.09 10*3/MM3 (ref 0.7–3.1)
LYMPHOCYTES NFR BLD AUTO: 15.6 % (ref 19.6–45.3)
MAGNESIUM SERPL-MCNC: 1.7 MG/DL (ref 1.6–2.6)
MCH RBC QN AUTO: 28.5 PG (ref 26.6–33)
MCHC RBC AUTO-ENTMCNC: 31.9 G/DL (ref 31.5–35.7)
MCV RBC AUTO: 89.2 FL (ref 79–97)
MONOCYTES # BLD AUTO: 1.1 10*3/MM3 (ref 0.1–0.9)
MONOCYTES NFR BLD AUTO: 8.2 % (ref 5–12)
NEUTROPHILS NFR BLD AUTO: 69.1 % (ref 42.7–76)
NEUTROPHILS NFR BLD AUTO: 9.22 10*3/MM3 (ref 1.7–7)
NITRITE UR QL STRIP: NEGATIVE
NRBC BLD AUTO-RTO: 0 /100 WBC (ref 0–0.2)
PH UR STRIP.AUTO: 7.5 [PH] (ref 5–8)
PHOSPHATE SERPL-MCNC: 2.5 MG/DL (ref 2.5–4.5)
PLATELET # BLD AUTO: 279 10*3/MM3 (ref 140–450)
PMV BLD AUTO: 8.9 FL (ref 6–12)
POTASSIUM SERPL-SCNC: 4.1 MMOL/L (ref 3.5–5.2)
PROT ?TM UR-MCNC: 51.5 MG/DL
PROT UR QL STRIP: ABNORMAL
PROT/CREAT UR: 3.63 MG/G{CREAT}
RBC # BLD AUTO: 3.16 10*6/MM3 (ref 4.14–5.8)
RBC # UR STRIP: ABNORMAL /HPF
REF LAB TEST METHOD: ABNORMAL
SODIUM SERPL-SCNC: 139 MMOL/L (ref 136–145)
SP GR UR STRIP: 1.01 (ref 1–1.03)
SQUAMOUS #/AREA URNS HPF: ABNORMAL /HPF
UROBILINOGEN UR QL STRIP: ABNORMAL
WBC # UR STRIP: ABNORMAL /HPF
WBC NRBC COR # BLD AUTO: 13.36 10*3/MM3 (ref 3.4–10.8)
YEAST URNS QL MICRO: ABNORMAL /HPF

## 2025-02-11 PROCEDURE — 94761 N-INVAS EAR/PLS OXIMETRY MLT: CPT

## 2025-02-11 PROCEDURE — 80048 BASIC METABOLIC PNL TOTAL CA: CPT | Performed by: STUDENT IN AN ORGANIZED HEALTH CARE EDUCATION/TRAINING PROGRAM

## 2025-02-11 PROCEDURE — 94799 UNLISTED PULMONARY SVC/PX: CPT

## 2025-02-11 PROCEDURE — 25010000002 PIPERACILLIN SOD-TAZOBACTAM PER 1 G: Performed by: STUDENT IN AN ORGANIZED HEALTH CARE EDUCATION/TRAINING PROGRAM

## 2025-02-11 PROCEDURE — 83735 ASSAY OF MAGNESIUM: CPT | Performed by: STUDENT IN AN ORGANIZED HEALTH CARE EDUCATION/TRAINING PROGRAM

## 2025-02-11 PROCEDURE — 84100 ASSAY OF PHOSPHORUS: CPT | Performed by: STUDENT IN AN ORGANIZED HEALTH CARE EDUCATION/TRAINING PROGRAM

## 2025-02-11 PROCEDURE — 63710000001 REVEFENACIN 175 MCG/3ML SOLUTION: Performed by: STUDENT IN AN ORGANIZED HEALTH CARE EDUCATION/TRAINING PROGRAM

## 2025-02-11 PROCEDURE — 85025 COMPLETE CBC W/AUTO DIFF WBC: CPT | Performed by: STUDENT IN AN ORGANIZED HEALTH CARE EDUCATION/TRAINING PROGRAM

## 2025-02-11 PROCEDURE — 82948 REAGENT STRIP/BLOOD GLUCOSE: CPT

## 2025-02-11 PROCEDURE — 81001 URINALYSIS AUTO W/SCOPE: CPT | Performed by: STUDENT IN AN ORGANIZED HEALTH CARE EDUCATION/TRAINING PROGRAM

## 2025-02-11 PROCEDURE — 82948 REAGENT STRIP/BLOOD GLUCOSE: CPT | Performed by: STUDENT IN AN ORGANIZED HEALTH CARE EDUCATION/TRAINING PROGRAM

## 2025-02-11 PROCEDURE — 99232 SBSQ HOSP IP/OBS MODERATE 35: CPT | Performed by: INTERNAL MEDICINE

## 2025-02-11 PROCEDURE — 84156 ASSAY OF PROTEIN URINE: CPT | Performed by: STUDENT IN AN ORGANIZED HEALTH CARE EDUCATION/TRAINING PROGRAM

## 2025-02-11 PROCEDURE — 94664 DEMO&/EVAL PT USE INHALER: CPT

## 2025-02-11 PROCEDURE — 63710000001 INSULIN LISPRO (HUMAN) PER 5 UNITS: Performed by: STUDENT IN AN ORGANIZED HEALTH CARE EDUCATION/TRAINING PROGRAM

## 2025-02-11 PROCEDURE — 25010000002 MAGNESIUM SULFATE 2 GM/50ML SOLUTION: Performed by: INTERNAL MEDICINE

## 2025-02-11 PROCEDURE — 99233 SBSQ HOSP IP/OBS HIGH 50: CPT | Performed by: INTERNAL MEDICINE

## 2025-02-11 PROCEDURE — 82570 ASSAY OF URINE CREATININE: CPT | Performed by: STUDENT IN AN ORGANIZED HEALTH CARE EDUCATION/TRAINING PROGRAM

## 2025-02-11 RX ORDER — MAGNESIUM SULFATE HEPTAHYDRATE 40 MG/ML
2 INJECTION, SOLUTION INTRAVENOUS ONCE
Status: COMPLETED | OUTPATIENT
Start: 2025-02-11 | End: 2025-02-11

## 2025-02-11 RX ADMIN — FINASTERIDE 5 MG: 5 TABLET, FILM COATED ORAL at 08:30

## 2025-02-11 RX ADMIN — MAGNESIUM HYDROXIDE 10 ML: 2400 SUSPENSION ORAL at 08:30

## 2025-02-11 RX ADMIN — NIFEDIPINE 30 MG: 30 TABLET, FILM COATED, EXTENDED RELEASE ORAL at 06:17

## 2025-02-11 RX ADMIN — REVEFENACIN 175 MCG: 175 SOLUTION RESPIRATORY (INHALATION) at 09:51

## 2025-02-11 RX ADMIN — SENNOSIDES AND DOCUSATE SODIUM 2 TABLET: 50; 8.6 TABLET ORAL at 08:30

## 2025-02-11 RX ADMIN — MAGNESIUM SULFATE IN WATER 2 G: 40 INJECTION, SOLUTION INTRAVENOUS at 08:32

## 2025-02-11 RX ADMIN — GUAIFENESIN 600 MG: 600 TABLET ORAL at 20:03

## 2025-02-11 RX ADMIN — TAMSULOSIN HYDROCHLORIDE 0.4 MG: 0.4 CAPSULE ORAL at 08:30

## 2025-02-11 RX ADMIN — CARVEDILOL 25 MG: 25 TABLET, FILM COATED ORAL at 20:03

## 2025-02-11 RX ADMIN — CARVEDILOL 25 MG: 25 TABLET, FILM COATED ORAL at 08:32

## 2025-02-11 RX ADMIN — ATORVASTATIN CALCIUM 20 MG: 10 TABLET, FILM COATED ORAL at 08:31

## 2025-02-11 RX ADMIN — ARFORMOTEROL TARTRATE 15 MCG: 15 SOLUTION RESPIRATORY (INHALATION) at 19:42

## 2025-02-11 RX ADMIN — Medication 10 ML: at 08:41

## 2025-02-11 RX ADMIN — Medication 20 ML: at 20:03

## 2025-02-11 RX ADMIN — BUMETANIDE 2 MG: 1 TABLET ORAL at 08:30

## 2025-02-11 RX ADMIN — BUDESONIDE 0.5 MG: 0.5 INHALANT RESPIRATORY (INHALATION) at 09:51

## 2025-02-11 RX ADMIN — BUSPIRONE HYDROCHLORIDE 15 MG: 5 TABLET ORAL at 08:31

## 2025-02-11 RX ADMIN — Medication 10 ML: at 20:59

## 2025-02-11 RX ADMIN — FERROUS SULFATE TAB 325 MG (65 MG ELEMENTAL FE) 325 MG: 325 (65 FE) TAB at 08:31

## 2025-02-11 RX ADMIN — INSULIN LISPRO 4 UNITS: 100 INJECTION, SOLUTION INTRAVENOUS; SUBCUTANEOUS at 17:19

## 2025-02-11 RX ADMIN — ARFORMOTEROL TARTRATE 15 MCG: 15 SOLUTION RESPIRATORY (INHALATION) at 09:51

## 2025-02-11 RX ADMIN — FAMOTIDINE 20 MG: 20 TABLET ORAL at 06:14

## 2025-02-11 RX ADMIN — INSULIN LISPRO 2 UNITS: 100 INJECTION, SOLUTION INTRAVENOUS; SUBCUTANEOUS at 20:25

## 2025-02-11 RX ADMIN — PIPERACILLIN AND TAZOBACTAM 4.5 G: 4; .5 INJECTION, POWDER, FOR SOLUTION INTRAVENOUS; PARENTERAL at 06:13

## 2025-02-11 RX ADMIN — INSULIN LISPRO 4 UNITS: 100 INJECTION, SOLUTION INTRAVENOUS; SUBCUTANEOUS at 12:20

## 2025-02-11 RX ADMIN — BUSPIRONE HYDROCHLORIDE 15 MG: 5 TABLET ORAL at 20:03

## 2025-02-11 RX ADMIN — SENNOSIDES AND DOCUSATE SODIUM 2 TABLET: 50; 8.6 TABLET ORAL at 20:03

## 2025-02-11 RX ADMIN — ASPIRIN 81 MG: 81 TABLET, COATED ORAL at 06:13

## 2025-02-11 RX ADMIN — PIPERACILLIN AND TAZOBACTAM 4.5 G: 4; .5 INJECTION, POWDER, FOR SOLUTION INTRAVENOUS; PARENTERAL at 15:21

## 2025-02-11 RX ADMIN — GUAIFENESIN 600 MG: 600 TABLET ORAL at 08:31

## 2025-02-11 RX ADMIN — SODIUM HYPOCHLORITE: 1.25 SOLUTION TOPICAL at 12:35

## 2025-02-11 RX ADMIN — TOBRAMYCIN 300 MG: 300 SOLUTION RESPIRATORY (INHALATION) at 19:50

## 2025-02-11 RX ADMIN — PIPERACILLIN AND TAZOBACTAM 4.5 G: 4; .5 INJECTION, POWDER, FOR SOLUTION INTRAVENOUS; PARENTERAL at 22:42

## 2025-02-11 RX ADMIN — TOBRAMYCIN 300 MG: 300 SOLUTION RESPIRATORY (INHALATION) at 09:51

## 2025-02-11 RX ADMIN — BUDESONIDE 0.5 MG: 0.5 INHALANT RESPIRATORY (INHALATION) at 19:42

## 2025-02-11 RX ADMIN — BUMETANIDE 2 MG: 1 TABLET ORAL at 20:02

## 2025-02-11 RX ADMIN — MONTELUKAST 10 MG: 10 TABLET, FILM COATED ORAL at 20:03

## 2025-02-11 NOTE — CONSULTS
"Nutrition Services    Patient Name: Preston Wallis  YOB: 1965  MRN: [id number]  Admission date: 1/23/2025      CLINICAL NUTRITION ASSESSMENT      Reason for Assessment  Follow Up   H&P:  Past Medical History:   Diagnosis Date    1. Incision and drainage of posterior perianal abscess 2. Sharp excisional debridement through skin and subcutaneous tissue in the posterior perianal area, 9 x 6 x 1 cm 01/26/2025    Age-related cognitive decline     Allergic contact dermatitis     Allergies     Anemia     Bedbound     11/2023 \"MY LEG MUSCLES STOPPED WORKING\"    Bronchiectasis with acute lower respiratory infection     Charcot foot due to diabetes mellitus 09/10/2013    Chronic diastolic (congestive) heart failure     Chronic kidney disease     Chronic respiratory failure with hypoxia     Closed supracondylar fracture of femur 01/12/2022    COPD (chronic obstructive pulmonary disease)     Deep vein thrombosis (DVT) of lower extremity associated with air travel 01/13/2023    Dependence on supplemental oxygen     Eczema     Erectile dysfunction     due to organic reasons    Essential (primary) hypertension     Fracture     closed fracture of other tarsal and metatarsal bones    Fracture of proximal humerus 01/13/2023    GERD without esophagitis     High risk medication use     Hypercholesteremia     Hypomagnesemia     Infected stasis ulcer of left lower extremity 01/13/2023    Insomnia     Low back pain     Major depressive disorder     Morbid (severe) obesity due to excess calories     MRSA pneumonia     Muscle weakness     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 without evidence of necrosis     Obstructive sleep apnea (adult) (pediatric)     On home O2     REPORTS WEARING 2L/NC AAT    Other forms of dyspnea     Other long term (current) drug therapy     Other specified noninfective gastroenteritis and colitis     Other spondylosis, lumbar region     Pain in both knees  " "   Paroxysmal atrial fibrillation     Peripheral neuropathy     attributed to type 2 diabetes    Pneumonia, unspecified organism     Polyneuropathy     Rash and other nonspecific skin eruption     Self-catheterizes urinary bladder     EVERY 4 HOURS    Smoking     \"SOMETIMES\"    Syncope and collapse     Tachycardia     Tinnitus 01/13/2023    Type 1 diabetes mellitus with diabetic chronic kidney disease     Type 2 diabetes mellitus     Unspecified fall, initial encounter     Urinary retention         Current Problems:   Active Hospital Problems    Diagnosis     **PNA (pneumonia)     1. Incision and drainage of posterior perianal abscess 2. Sharp excisional debridement through skin and subcutaneous tissue in the posterior perianal area, 9 x 6 x 1 cm     Perianal abscess     Wound of gluteal cleft     Bacterial pneumonia     Bronchiectasis with acute exacerbation     Pneumonia due to Pseudomonas species     Bronchiectasis without complication     COPD exacerbation         Nutrition/Diet History         Narrative   Good po intake of % noted. Tolerating diet well, with no concerns offered. Diabetic diet remains appropriate. Rosalino BID in place for wound healing. Continue with current nutrition interventions in place.      Anthropometrics        Current Height, Weight Height: 175.3 cm (69\")  Weight: 108 kg (238 lb 1.6 oz)   Current BMI Body mass index is 35.16 kg/m².   BMI Classification Obese Class II   % %       Weight Hx  Wt Readings from Last 30 Encounters:   02/11/25 0327 108 kg (238 lb 1.6 oz)   02/10/25 0532 111 kg (244 lb 4.3 oz)   02/09/25 0300 111 kg (245 lb 6 oz)   02/08/25 0530 110 kg (243 lb 2.7 oz)   02/07/25 0700 109 kg (239 lb 3.2 oz)   02/06/25 0500 109 kg (239 lb 6.7 oz)   02/05/25 0500 107 kg (236 lb 15.9 oz)   02/04/25 0437 110 kg (241 lb 13.5 oz)   02/03/25 0500 112 kg (247 lb 2.2 oz)   02/02/25 0500 118 kg (260 lb 2.3 oz)   02/01/25 0500 120 kg (263 lb 14.3 oz)   01/31/25 0500 121 kg (265 " lb 10.5 oz)   01/30/25 0500 120 kg (263 lb 10.7 oz)   01/29/25 0500 123 kg (271 lb 2.7 oz)   01/27/25 0500 118 kg (260 lb 2.3 oz)   01/26/25 0308 113 kg (248 lb 14.4 oz)   01/25/25 0429 111 kg (245 lb 2.4 oz)   01/24/25 0924 111 kg (244 lb 0.8 oz)   01/23/25 1555 123 kg (270 lb 4.5 oz)   12/12/24 1050 109 kg (241 lb)   12/11/24 1048 109 kg (241 lb)   12/05/24 1155 111 kg (244 lb)   10/30/24 1004 111 kg (245 lb)   08/22/24 1439 109 kg (241 lb)   08/15/24 1325 112 kg (247 lb 9.2 oz)   08/12/24 1238 126 kg (278 lb)   07/18/24 1326 126 kg (278 lb)   05/20/24 1030 119 kg (263 lb)   05/17/24 1151 119 kg (263 lb)   05/17/24 1028 119 kg (263 lb)   05/03/24 0432 125 kg (275 lb 5.7 oz)   05/01/24 0438 124 kg (272 lb 14.9 oz)   04/30/24 0600 127 kg (279 lb 5.2 oz)   04/29/24 2114 126 kg (277 lb 9 oz)   04/29/24 0241 116 kg (255 lb 11.7 oz)   04/19/24 0614 116 kg (255 lb 1.2 oz)   04/18/24 0617 119 kg (262 lb 2 oz)   04/17/24 0616 115 kg (252 lb 13.9 oz)   04/16/24 0519 124 kg (272 lb 11.3 oz)   04/15/24 0617 124 kg (272 lb 14.9 oz)   04/15/24 0057 124 kg (273 lb 9.5 oz)   04/15/24 0050 124 kg (273 lb 9.5 oz)   04/14/24 1908 127 kg (279 lb 1.6 oz)   04/04/24 1042 122 kg (270 lb)   02/16/24 1458 126 kg (276 lb 10.8 oz)   01/30/24 1032 128 kg (282 lb)   01/11/24 1556 123 kg (272 lb)   12/15/23 0330 127 kg (280 lb)   12/14/23 1400 126 kg (277 lb)   12/06/23 1904 126 kg (277 lb 12.5 oz)   11/20/23 0953 126 kg (278 lb)   11/17/23 1019 126 kg (278 lb)   11/06/23 1119 126 kg (278 lb)   10/11/23 1020 126 kg (278 lb)   08/22/23 0917 126 kg (278 lb)   08/21/23 1017 129 kg (285 lb)   07/24/23 0849 129 kg (285 lb)   07/19/23 1346 129 kg (285 lb)   06/29/23 1009 132 kg (290 lb)   03/23/23 1508 132 kg (290 lb)          Wt Change Observation -12.2% x 6 months     Estimated/Assessed Needs  Estimated Needs based on: Ideal Body Weight       Energy Requirements 25-30 kcal/kg   EST Needs (kcal/day) 8442-3186       Protein Requirements 1.2-1.5 g.kg    EST Daily Needs (g/day)        Fluid Requirements 1 ml/kcal    Estimated Needs (mL/day) 1872-7168     Labs/Medications         Pertinent Labs Reviewed.   Results from last 7 days   Lab Units 02/11/25  0430 02/10/25  1014 02/10/25  0411   SODIUM mmol/L 139 138 138   POTASSIUM mmol/L 4.1 4.1 4.3   CHLORIDE mmol/L 98 96* 98   CO2 mmol/L 29.5* 30.8* 30.6*   BUN mg/dL 60* 58* 63*   CREATININE mg/dL 2.54* 2.49* 2.70*   CALCIUM mg/dL 8.7 8.5* 8.4*   BILIRUBIN mg/dL  --  0.4  --    ALK PHOS U/L  --  127*  --    ALT (SGPT) U/L  --  21  --    AST (SGOT) U/L  --  17  --    GLUCOSE mg/dL 137* 250* 181*     Results from last 7 days   Lab Units 02/11/25  0430 02/10/25  0411 02/09/25  0346   MAGNESIUM mg/dL 1.7 1.7 1.8   PHOSPHORUS mg/dL 2.5 2.6 2.7   HEMOGLOBIN g/dL 9.0* 9.1* 8.5*   HEMATOCRIT % 28.2* 28.6* 26.9*     COVID19   Date Value Ref Range Status   01/23/2025 Not Detected Not Detected - Ref. Range Final     Lab Results   Component Value Date    HGBA1C 8.8 (A) 12/11/2024         Pertinent Medications Reviewed.     Malnutrition Severity Assessment              Nutrition Diagnosis         Nutrition Dx Problem 1 Increased nutrient needs related to increased protein demand as evidenced by impaired skin integrity.     Nutrition Intervention           Current Nutrition Orders & Evaluation of Intake       Current PO Diet Diet: Regular/House, Diabetic, Cardiac; Healthy Heart (2-3 Na+); Consistent Carbohydrate; Fluid Consistency: Thin (IDDSI 0)   Supplement Orders Placed This Encounter      Dietary Nutrition Supplements Rosalino; orange      DIET MESSAGE No Caffeine due to testing  on 1-27-24           Nutrition Intervention/Prescription        Rosalino BID mixed with diet Sprite  Diabetic diet         Medical Nutrition Therapy/Nutrition Education          Learner     Readiness N/A OOR  N/A     Method     Response N/A  N/A     Monitor/Evaluation        Monitor Per protocol, PO intake, Supplement intake, Weight, Skin status,  POC/GOC     Nutrition Discharge Plan         To be determined     Electronically signed by:  Gabby Lobo RD  02/11/25 15:13 EST

## 2025-02-11 NOTE — PROGRESS NOTES
[facility] Progress Note      Patient Name:  Preston Wallis   MRN:  [id number]   :  1965   Date of Admission:  2025   Date of Service:  2025   PMD:  Kimmy Riley MD     Hospital Course:     59 y.o. male past medical history of COPD, hypertension, CHF, history of MDRO presents to the ED due to shortness of breath. Patient was discharged on  for MDRO pseudomonas pneumonia on IV ertapenem.      Patient admitted for shortness of breath.  Chest x-ray shows chronic bilateral lung infiltrates.  CT was performed to arrival of the chest which demonstrates new spiculated left lower lobe nodule, bronchiectasis throughout both lungs, micronodule or peribronchial densities suggestive of atypical infection, right paratracheal lymph node and enlarging pericardial effusion.  Pulmonology and cardiology services consulted.  TTE demonstrates very small pericardial effusion only, estimated EF 49%, with some grade 1 diastolic dysfunction left ventricle.  Wound care noted necrotic tissue perianal region.  CT abdomen pelvis was performed, 4.5 cm x 1.8 cm x 3.5 cm posterior anal abscess noted.  General surgery consulted, patient underwent incision and drainage .  S/p bronchoscopy 2025, BAL grew MDRO Pseudomonas, he completed 7 days of IV meropenem and discontinued on MOIZ nebs.  Wound culture positive for Enterococcus VRE and MDRO Citrobacter, currently receiving IV linezolid and IV Zosyn.  General surgery following and planning diverting colostomy for wound healing.  Underwent laparoscopic diverting loop colostomy procedure along with primary repair of chronically incarcerated 3 cm umbilical hernia on 2025.  General surgery on board.     Interval Followup: Patient had clot with sanguinous output from his perianal wound, receiving wound care.  Colostomy site looks clean with minimal bloody output.  Hemoglobin 6 today, transfusing 3 units of PRBC.  Patient laying comfortably  in bed, no active complaints.  Leukocytosis improving.  Family at bedside, updated.       Consultants:    Neurosurgery  Pulmonology    Procedures:  Chest x-ray    Anti-infectives:    IV Zosyn    February 10, 2025    Chief Complaint: Patient with shortness of breath off and on since several days    Subjective:   Shortness of breath feels better today, seen by surgery had diverting colostomy.    February 11 , 2025    Chief Complaint: Patient with shortness of breath off and on since several days    Subjective: Feels better today less short of breath on 2 L nasal cannula has a lot of weakness and fatigue.  UA positive for yeast  Has worsening renal failure creatinine is 2.54    Review Of Systems:  GENERAL: Complains of weakness and fatigue no time is denies any weight loss appetite is normal.  HEENT:  Denies any rhinitis, no sore throat, no diplopia , hearing is normal  Respiratory: Complains of shortness of breath , no sweating d complaint yspnea on exertion , cough or sputum.  CVS:  Denies any chest pain , palpitation or syncope.  Gi:  No nausea, no vomiting, no diarrhea, no hematemesis , no melena , no rectal bleeding  :  No dysuria frequency , hematuria, no retention:  Musculoskeletal:  Denies any arthritis, or calf pain  Incision and drainage of posterior perianal abscess 2.          Sharp excisional debridement through skin and subcutaneous tissue in the posterior perianal area, 9 x 6 x 1 cm    Wound of gluteal cleft  Hematological:  No bleeding , no petechiae.  Skin:  Denies rash , pruritus , jaundice  Endocrine:  No polyuria , polydipsia , polyphagia.  CNS:  Denies any confusion , headache , or seizure disorder  Psychiatry:  No anxiety , or depression, no suicide ideation      Objective:  Vitals:    02/11/25 0700 02/11/25 0951 02/11/25 1002 02/11/25 1100   BP: 135/61   153/64   BP Location: Left arm   Left arm   Patient Position: Lying   Lying   Pulse: 85 79 78 75   Resp: 18 18  18   Temp: 98.4 °F (36.9 °C)    "97.9 °F (36.6 °C)   TempSrc: Oral   Oral   SpO2: 95% 95%  94%   Weight:       Height:              Physical Exam:  GENERAL APPEARANCE: Alert and oriented.  Appears comfortable.  No acute distress  HEENT: Atraumatic, normocephalic,  PERRLA, mucous membranes moist  NECK: supple; no JVD or thyromegaly noted  CARDIOVASCULAR: Regular rate and rhythm, no murmurs appreciated; no edema present in BLE   RESPIRATORY: Harsh vesicular breathing with scattered rhonchi and crepitation.  GASTROINTESTINAL:  Abdomen  soft; bowel sounds present, non-tender, non-distended, no organomegaly, no CVA tenderness   EXTREMITIES: Pulses equal bilaterally   MUSCULOSKELETAL:  Good muscle strength noted no obvious deformity appreciate.          PSYCH: Appropriate mood and affect  Neurologic:  Alert & oriented x 3.  Normal Mental status.  Normal Cranial Nervies.  EOMI.  FABIO.  Normal 5/5 muscular strength in both upper and lower extremities.  Normal sensation.  Normal and symmetric reflexes. Normal cerbellar function.  Normal Gait. Negative Babinski.    Labs Reviewed  CBC:    Lab Results   Component Value Date    WBC 13.36 (H) 02/11/2025    HGB 9.0 (L) 02/11/2025    HCT 28.2 (L) 02/11/2025    MCV 89.2 02/11/2025     CMP:    Lab Results   Component Value Date     02/11/2025    CO2 29.5 (H) 02/11/2025    GLUCOSE 137 (H) 02/11/2025    BUN 60 (H) 02/11/2025    CREATININE 2.54 (H) 02/11/2025    ALBUMIN 2.8 (L) 02/10/2025    CALCIUM 8.7 02/11/2025    AST 17 02/10/2025    ALT 21 02/10/2025     Lipis Panel:   Lab Results   Component Value Date    CHOL 116 01/25/2025    HDL 54 01/25/2025      INR:    Lab Results   Component Value Date    INR 1.21 (H) 02/16/2024     Cardiac:    Lab Results   Component Value Date    CKMB 14.07 (H) 02/22/2024     No results found for: \"BNP\"  Lactate:    Lab Results   Component Value Date    LACTATE 1.1 02/10/2025    LACTATE 0.9 01/23/2025    LACTATE 0.69 04/29/2024     Blood Culture:  " @lastlabx(cult:2)@    Imaging:  XR Chest 1 View    Result Date: 2/5/2025  Narrative: XR CHEST 1 VW Date of Exam: 2/5/2025 7:13 AM EST Indication: Pneumonia Comparison: Chest radiograph dated 1/23/2025, CT chest dated 1/24/2025 Findings: The patient is rotated. There is a right-sided chest port with tip terminating at the upper SVC. There is a left-sided subclavian line with tip terminating also at the upper SVC. There is cardiomegaly. There is bilateral upper lobe airspace opacity corresponding to areas of known bronchiectasis seen on the prior examination. There is right basilar airspace disease. There are probable trace bilateral pleural effusions. No visible pneumothorax.     Impression: Impression: 1.Bilateral upper lobe airspace opacities corresponding to areas of bronchiectasis seen on the prior examination. 2.Right basilar airspace disease which may represent atelectasis or pneumonia. 3.Trace bilateral pleural effusions. 4.Cardiomegaly. Electronically Signed: Mynor Singleton  2/5/2025 7:47 AM EST  Workstation ID: [id number]    Stress Test With Myocardial Perfusion One Day    Result Date: 1/27/2025  Narrative:   Left ventricular ejection fraction is mildly reduced (Calculated EF = 40%).   Low probability of ischemia during this study, patient may had an apical infarct versus apical thinning..   Findings consistent with an equivocal ECG stress test.     CT Abdomen Pelvis Without Contrast    Result Date: 1/25/2025  Narrative: CT ABDOMEN PELVIS WO CONTRAST Date of Exam: 1/25/2025 10:15 AM EST Indication: perirectal abscess. Comparison: None available. Technique: Axial CT images were obtained of the abdomen and pelvis without the administration of contrast. Reconstructed coronal and sagittal images were also obtained. Automated exposure control and iterative construction methods were used. Findings: LUNG BASES: Minimal basal atelectasis with trace effusions. Heart is enlarged. There is a 10 mm thickness pericardial  effusion. LIVER:  Unremarkable parenchyma without focal lesion. BILIARY/GALLBLADDER: Cholecystectomy SPLEEN:  Unremarkable PANCREAS:  Unremarkable ADRENAL:  Unremarkable KIDNEYS: Mild bilateral renal cortical atrophy with no solid mass identified. No obstruction.  No calculus identified. GASTROINTESTINAL/MESENTERY:  No evidence of obstruction nor inflammation.  No evidence of acute appendicitis. There is minimal right paracolic free fluid. AORTA/IVC:  Normal caliber. RETROPERITONEUM/LYMPH NODES:  Unremarkable REPRODUCTIVE:  Unremarkable BLADDER: Doyle catheter is present. OSSEUS STRUCTURES: No acute osseous process is identified. There is a posterior perianal abscess measuring 4.5 cm maximum AP dimension by 1.8 cm maximum transverse dimension by 3.5 cm in maximum CC dimension. This extends to the deep intergluteal crease skin surface. No intrapelvic extension noted.     Impression: Impression: 1.There is a posterior perianal abscess as detailed above. No intrapelvic extension. 2.Cardiomegaly with relatively small pericardial effusion. There is bibasilar atelectasis with trace pleural effusions. 3.Other incidental nonemergent findings as detailed above. Electronically Signed: Rob Milner MD  1/25/2025 10:55 AM EST  Workstation ID: [id number]    Adult Transthoracic Echo Complete W/ Cont if Necessary Per Protocol    Result Date: 1/24/2025  Narrative:   Left ventricular systolic function is low normal. Calculated left ventricular EF = 49.4%   Left ventricular wall thickness is consistent with moderate concentric hypertrophy.   Left ventricular diastolic function is consistent with (grade I) impaired relaxation.   There is a small (<1cm) pericardial effusion adjacent to the right ventricle.     CT Chest Without Contrast Diagnostic    Result Date: 1/24/2025  Narrative: CT CHEST WO CONTRAST DIAGNOSTIC Date of Exam: 1/24/2025 7:48 AM EST Indication: Shortness of breath.. Comparison: 10/22/2024 Technique: Axial CT images  were obtained of the chest without contrast administration.  Reconstructed coronal and sagittal images were also obtained. Automated exposure control and iterative construction methods were used. Findings: There is a new somewhat spiculated nodular focus in the superior left lower lobe image #66 of series 2. The rapid interval development suggests an infectious or inflammatory process. Short-term follow-up CT suggested. Stable appearance of verrucous and cystic bronchiectasis involving all lobes of the lungs, but greatest in the right upper lobe and lower lobes. There are again adjacent micronodular densities peribronchial distribution suggesting suggesting an infectious or inflammatory process. Nontuberculous mycobacterial infection or other atypical agent would be a top considerations. There is layering mucoid material in the trachea and right main bronchus. There is also probable mucoid impaction within areas of bronchiectasis noted. Small bilateral pleural effusions are noted, slightly increased from prior. There is consolidation in the lung bases bilaterally which may be due to atelectasis or additional infiltrates. Right subclavian Sspzqw-y-Bxne catheter again noted. Retained tubing from left subclavian Yubrta-p-Zavs catheter again noted. There is an increasing pericardial effusion, measuring up to 2.6 cm posteriorly on the right. Enlarged right paratracheal lymph node is noted measuring up to 1.4 cm, likely reactive. Hilar adenopathy is also suspected, but not well characterized due to noncontrast technique. Thyroid is enlarged and heterogeneous which may be due to goitrous change. Limited imaging through the upper abdomen demonstrates postcholecystectomy change. The adrenals have a grossly normal morphology. There is degenerative change in the spine.     Impression: Impression: 1.There is a new somewhat spiculated nodular focus in the superior left lower lobe image #66 of series. The rapid interval  development suggests an infectious or inflammatory process. 3-month follow-up CT recommended per Fleischner guidelines. 2.Stable appearance of verrucous and cystic bronchiectasis involving all lobes of the lungs, but greatest in the right upper lobe and lower lobes. There are again adjacent micronodular densities in a peribronchial distribution suggesting an infectious or  inflammatory process. Nontuberculous mycobacterial infection or other atypical agent would be a top considerations. 3.Small bilateral pleural effusions, slightly increased from prior. There is consolidation in the lung bases bilaterally which may be due to atelectasis or additional infiltrates. 4.Increasing pericardial effusion, now measuring up to 2.6 cm posteriorly on the right. 5.Enlarged right paratracheal lymph node measuring up to 1.4 cm, likely reactive. Hilar adenopathy is also suspected, but not well characterized due to noncontrast technique. 6.Additional findings as given above. Electronically Signed: Deshawn Soto MD  1/24/2025 9:43 AM EST  Workstation ID: [id number]    XR Chest 1 View    Result Date: 1/23/2025  Narrative: XR CHEST 1 VW Date of Exam: 1/23/2025 4:47 PM EST Indication: SOA Triage Protocol Comparison: Chest AP dated 12/5/2024 Findings: There is an abandoned segment of a left subclavian central venous catheter measuring 17.3 cm in length. A right subclavian port infusion catheter terminates with the tip in the proximal superior vena cava. Patchy airspace opacities are again noted in the lung fields bilaterally, most predominantly in the mid to upper right lung field. Patient is rotated to the right. Heart size is favored to remain within normal limits. No definite effusion is seen.     Impression: Impression: Patchy chronic bilateral lung infiltrates. No acute infiltrate. Electronically Signed: Amado Salmeron MD  1/23/2025 5:11 PM EST  Workstation ID: [id number]    XR Chest 1 View    Result Date: 1/17/2025  Narrative: PORTABLE  CHEST    1/17/2025 6:20 PM HISTORY: SHORTNESS OF BREATH COUGH COMPARISON: Multiple prior studies. FINDINGS: The heart is proper size. The mediastinum is unremarkable. Persistent diffuse airspace opacities in the upper and lower lung zones. Cystic bronchiectasis is redemonstrated. Small right pleural effusion. There are unchanged support lines.    Impression: Persistent multifocal pneumonia. Images reviewed, interpreted, and dictated by Dr. Dhruv Duncan. Transcribed by Rosie Monsalve.    CT Chest Without Contrast Diagnostic    Result Date: 1/17/2025  Narrative: CT CHEST WITHOUT CONTRAST HISTORY: Shortness of air, cough COMPARISON: February 11, 2024 FINDINGS: Axial CT images of the chest were obtained without contrast. Sagittal and coronal reformatted images were also obtained and reviewed. This study was performed with techniques to keep radiation doses as low as reasonably achievable, (ALARA). Individualized dose reduction techniques using automated exposure control or adjustment of mA and/or kV according to the patient size were employed. Bilateral deep lines are present. Small mediastinal nodes are seen without evidence of adenopathy. A new small pericardial effusion is noted measuring up to 13 mm in thickness. No axillary mass or adenopathy is present. There is mild bilateral gynecomastia. Widespread cystic bronchiectasis is again seen involving both lungs. There has been significant interval improvement in the multifocal nodular opacities seen on the prior exam felt to represent multifocal pneumonia. Mild atelectasis is noted at the lung bases. A small right pleural effusion is seen. No chest wall abnormality is identified. Limited images of the upper abdomen reveal postoperative changes from cholecystectomy.    Impression: Stable widespread cystic bronchiectasis in both lungs. Improved multifocal pneumonia since the prior study. New small pericardial effusion. Images reviewed, interpreted, and  dictated by Francois Hatch MD      Medications Reviewed:  arformoterol, 15 mcg, Nebulization, BID - RT  aspirin, 81 mg, Oral, QAM  atorvastatin, 20 mg, Oral, Daily  senna-docusate sodium, 2 tablet, Oral, BID   And  polyethylene glycol, 17 g, Oral, Daily   And  bisacodyl, 5 mg, Oral, Daily  budesonide, 0.5 mg, Nebulization, BID  bumetanide, 2 mg, Oral, BID  busPIRone, 15 mg, Oral, BID  carvedilol, 25 mg, Oral, Q12H  [Held by provider] enoxaparin, 40 mg, Subcutaneous, Nightly  famotidine, 20 mg, Oral, QAM  ferrous sulfate, 325 mg, Oral, Daily With Breakfast  finasteride, 5 mg, Oral, Daily  guaiFENesin, 600 mg, Oral, Q12H  insulin lispro, 2-9 Units, Subcutaneous, 4x Daily AC & at Bedtime  magnesium hydroxide, 10 mL, Oral, Daily  montelukast, 10 mg, Oral, Nightly  NIFEdipine XL, 30 mg, Oral, QAM  piperacillin-tazobactam, 4.5 g, Intravenous, Q8H  revefenacin, 175 mcg, Nebulization, Daily - RT  sodium chloride, 10 mL, Intravenous, Q12H  sodium chloride, 10 mL, Intravenous, Q12H  Sodium Hypochlorite, , Topical, 2 times per day  tamsulosin, 0.4 mg, Oral, Daily  tobramycin PF, 300 mg, Nebulization, BID - RT         Assessment/Plan:      PNA (pneumonia)    COPD exacerbation    Bronchiectasis without complication    Pneumonia due to Pseudomonas species    Bronchiectasis with acute exacerbation    Bacterial pneumonia    Perianal abscess    1. Incision and drainage of posterior perianal abscess 2. Sharp excisional debridement through skin and subcutaneous tissue in the posterior perianal area, 9 x 6 x 1 cm    Wound of gluteal cleft    Medical decision making:  MDRO Pseudomonas pneumonia:  # Acute on chronic hypoxic respiratory failure status post bronchoscopy:  Weaned to 2 L of oxygen  -Pulmonology consult appreciated.  -Supportive care  -Continue Zosyn and tobramycin nebs  -Supportive care  -Serial labs  -Status post bronchoscopy January 28 and February 3     # Perianal abscess status post colostomy:  -Status post I&D January  26  -Status post diverting loop colostomy February 6  -Appreciate surgery recommendations  -Wound care  Surgery input appreciated     # Diabetes mellitus type 2  -Insulin sliding scale, monitor requirements and adjust as needed     # Paroxysmal atrial fibrillation:  -Eliquis currently on hold due to anemia  -Continue to monitor and add back as appropriate    DVT Prophylaxis:    Lovenox    CODE STATUS:  Full code  Reason for continued hospitalization  and medical necessity :  Pseudomonas pneumonia and perianal abscess requiring further management    Orders:  CBC  CMP  IV Zosyn  Wound care        Time spent:  36+ minute not only  including face-to-face rounding putting in the orders writing the note and all the conversation reviewing the records    This transcription was electronically signed.         Disposition:  {plan; Home with self-care    Diet: ADA 1800      Discussed with: Patient and family      This has been electronically signed by:  _______________________________    Khushbu Abbasi [email or web address]     At [facility], we believe that sharing information builds trust and better relationships. You are receiving this note because you recently visited [facility]. It is possible you will see health information before a provider has talked with you about it. This kind of information can be easy to misunderstand. To help you fully understand what it means for your health, we urge you to discuss this note with your provider.           Part of this note may be an electronic transcription/translation of spoken language to printed ,text using the Dragon Dictation System.

## 2025-02-11 NOTE — CONSULTS
"  [facility]   Consult Note    Patient Name: Preston Wallis  : 1965  MRN: [id number]  Primary Care Physician:  Kimmy Riley MD  Referring Physician: Kyra Chin, *  Date of admission: 2025    Subjective   Subjective     Reason for Consult/ Chief Complaint: ERIC on CKD     HPI:  Preston Wallis is a 59 y.o. male 59-year male with past medical history of CKD stage III with baseline creatinine 1.7-2, insulin-dependent diabetes, hypertension, COPD, history of pulmonary embolism on anticoagulation who has had a prolonged hospital course primarily after he presented to 2 shortness of breath and was found to have bronchiectasis with bronch showing multidrug-resistant Pseudomonas requiring IV antibiotics and abdominal pain with anal abscess status post incision and drainage and now status post loop colostomy for incarcerated umbilical hernia in the first week of February.      Patient continues to have some output from his perineal wound.  Colostomy site looks clear.  Hemoglobin was noted to be low and patient receiving 3 notes of blood.  Patient's renal dysfunction is stable however creatinine has been on the higher side due to diuresis.  Nephrology has been consulted for management of CKD and mild ERIC    Review of Systems  All review of systems negative except as given below.    Personal History     Past Medical History:   Diagnosis Date    1. Incision and drainage of posterior perianal abscess 2. Sharp excisional debridement through skin and subcutaneous tissue in the posterior perianal area, 9 x 6 x 1 cm 2025    Age-related cognitive decline     Allergic contact dermatitis     Allergies     Anemia     Bedbound     2023 \"MY LEG MUSCLES STOPPED WORKING\"    Bronchiectasis with acute lower respiratory infection     Charcot foot due to diabetes mellitus 09/10/2013    Chronic diastolic (congestive) heart failure     Chronic kidney disease     Chronic respiratory failure with hypoxia     " "Closed supracondylar fracture of femur 01/12/2022    COPD (chronic obstructive pulmonary disease)     Deep vein thrombosis (DVT) of lower extremity associated with air travel 01/13/2023    Dependence on supplemental oxygen     Eczema     Erectile dysfunction     due to organic reasons    Essential (primary) hypertension     Fracture     closed fracture of other tarsal and metatarsal bones    Fracture of proximal humerus 01/13/2023    GERD without esophagitis     High risk medication use     Hypercholesteremia     Hypomagnesemia     Infected stasis ulcer of left lower extremity 01/13/2023    Insomnia     Low back pain     Major depressive disorder     Morbid (severe) obesity due to excess calories     MRSA pneumonia     Muscle weakness     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 without evidence of necrosis     Obstructive sleep apnea (adult) (pediatric)     On home O2     REPORTS WEARING 2L/NC AAT    Other forms of dyspnea     Other long term (current) drug therapy     Other specified noninfective gastroenteritis and colitis     Other spondylosis, lumbar region     Pain in both knees     Paroxysmal atrial fibrillation     Peripheral neuropathy     attributed to type 2 diabetes    Pneumonia, unspecified organism     Polyneuropathy     Rash and other nonspecific skin eruption     Self-catheterizes urinary bladder     EVERY 4 HOURS    Smoking     \"SOMETIMES\"    Syncope and collapse     Tachycardia     Tinnitus 01/13/2023    Type 1 diabetes mellitus with diabetic chronic kidney disease     Type 2 diabetes mellitus     Unspecified fall, initial encounter     Urinary retention        Past Surgical History:   Procedure Laterality Date    BRONCHOSCOPY N/A 1/28/2025    Procedure: BRONCHOSCOPY: BAL: insertion of lighted instrument to view inside the lung;  Surgeon: Walter Nicole DO;  Location: [facility] MA[facility] OR;  Service: Pulmonary;  Laterality: N/A;    BRONCHOSCOPY N/A " 2/3/2025    Procedure: BRONCHOSCOPY WITH BRONCHOALVEOLAR LAVAGE, POSSIBLE BIOPSY, BRUSHING, WASHING, AIRWAY INSPECTION: insertion of lighted instrument to view inside the lung;  Surgeon: Chadd Swan MD;  Location: [facility] OR;  Service: Pulmonary;  Laterality: N/A;    CARDIAC CATHETERIZATION Left 8/15/2024    Procedure: Carbon dioxide aortogram with left leg angiogram, possible angioplasty or stenting;  Surgeon: Moshe Willson MD;  Location: [facility] CATH INVASIVE LOCATION;  Service: Vascular;  Laterality: Left;    CHOLECYSTECTOMY      COLOSTOMY N/A 2/6/2025    Procedure: COLOSTOMY LAPAROSCOPIC; plain text: creation of colostomy using small incisions;  Surgeon: Emerson Canada MD;  [place] [facility] [place] [facility]  Service: General;  Laterality: N/A;    CYSTOSCOPY      FEMUR SURGERY Left     Shravan placed    INCISION AND DRAINAGE ABSCESS N/A 1/26/2025    Procedure: INCISION AND DRAINAGE ABSCESS; plain text: incision and drain pus from buttocks wound;  Surgeon: Emerson Canada MD;  [place] [facility] [place] [facility]  Service: General;  Laterality: N/A;    KNEE SURGERY Left     OTHER SURGICAL HISTORY Left     venous port, REMOVED    PORTACATH PLACEMENT Right     TIBIAL PLATEAU OPEN REDUCTION INTERNAL FIXATION Left 12/22/2023    Procedure: TIBIAL PLATEAU OPEN REDUCTION INTERNAL FIXATION;  Surgeon: Hugo Kline MD;  Location: [facility] OR;  Service: Orthopedics;  Laterality: Left;    TONSILLECTOMY AND ADENOIDECTOMY         Family History: family history includes Asthma in his father; Cancer in his sister; Coronary artery disease in his mother; Diabetes type II in his mother and sister; Hypertension in his mother. Otherwise pertinent FHx was reviewed and not pertinent to current issue.    Social History:  reports that he quit smoking about 32 years ago. His smoking use included cigarettes. He started smoking about 44 years ago. He has a 12 pack-year smoking history. He has been exposed to tobacco smoke. He has never  used smokeless tobacco. He reports that he does not currently use alcohol. He reports that he does not use drugs.    Home Medications:  DULoxetine, FreeStyle Bethany 3 Plus Sensor, GNP One Daily Plus Iron, Insulin Lispro (1 Unit Dial), Magnesium Oxide -Mg Supplement, NIFEdipine XL, O2, Semaglutide (1 MG/DOSE), Vitamin D3, apixaban, arformoterol, aspirin, atorvastatin, budesonide, bumetanide, busPIRone, calcium citrate, carvedilol, dapagliflozin, docusate sodium, famotidine, ferrous gluconate, finasteride, folic acid, insulin detemir, ipratropium-albuterol, montelukast, tamsulosin, tiotropium, tobramycin PF, traZODone, and vitamin C    Allergies:  Allergies   Allergen Reactions    Benadryl [Diphenhydramine] Itching    Proventil [Albuterol] Other (See Comments)     Mouth sores         Objective    Objective     Vitals:   Temp:  [97.8 °F (36.6 °C)-98.7 °F (37.1 °C)] 98.4 °F (36.9 °C)  Heart Rate:  [74-85] 78  Resp:  [18-20] 18  BP: (135-150)/(50-67) 135/61  Flow (L/min) (Oxygen Therapy):  [2] 2    Physical Exam:             Constitutional:         Awake, alert responsive, conversant, no obvious distress   Eyes:                       PERRLA, sclerae anicteric, no conjunctival injection   HEENT:                   Moist mucous membranes, no nasal or eye discharge, no throat congestion   Neck:                      Supple, no thyromegaly, no lymphadenopathy, trachea midline, no elevated JVD   Respiratory:           Clear to auscultation bilaterally, nonlabored respirations    Cardiovascular:     RRR, no murmurs, rubs, or gallops, palpable pedal pulses bilaterally, No bilateral ankle edema   Gastrointestinal:   Positive bowel sounds, soft, nontender, non-distended, no organomegaly   Musculoskeletal:  No clubbing or cyanosis to extremities, muscle wasting, joint swelling, muscle weakness   Psychiatric:              Appropriate affect, cooperative   Neurologic:            Awake alert, oriented x 3, strength symmetric in all  extremities, Cranial Nerves grossly intact to confrontation, speech clear   Skin:                      No rashes, bruising, skin ulcers, petechiae or ecchymosis    Result Review    Result Review:  I have personally reviewed the results from the time of this admission to 2/11/2025 10:56 EST and agree with these findings:  []  Laboratory  []  Microbiology  []  Radiology  []  EKG/Telemetry   []  Cardiology/Vascular   []  Pathology  []  Old records  []  Other:    Results from last 7 days   Lab Units 02/11/25  0430 02/10/25  0411 02/09/25  0346 02/08/25  0548 02/07/25  2047 02/07/25  1139 02/07/25  0500 02/06/25  2336   WBC 10*3/mm3 13.36* 13.84* 14.76* 13.33*  --  15.00* 18.11* 22.49*   HEMOGLOBIN g/dL 9.0* 9.1* 8.5* 9.2* 9.0* 7.1* 6.0* 7.0*   PLATELETS 10*3/mm3 279 229 192 173  --  176 190 196     Results from last 7 days   Lab Units 02/11/25  0430 02/10/25  1014 02/10/25  0411 02/09/25  0346 02/08/25  0547 02/07/25  0500 02/06/25  0530   SODIUM mmol/L 139 138 138 135* 135* 135* 134*   POTASSIUM mmol/L 4.1 4.1 4.3 3.9 4.0 4.6 4.4   CHLORIDE mmol/L 98 96* 98 96* 97* 95* 93*   CO2 mmol/L 29.5* 30.8* 30.6* 29.2* 28.4 31.8* 34.6*   ANION GAP mmol/L 11.5 11.2 9.4 9.8 9.6 8.2 6.4   BUN mg/dL 60* 58* 63* 58* 53* 55* 47*   CREATININE mg/dL 2.54* 2.49* 2.70* 2.58* 2.58* 2.29* 2.28*   GLUCOSE mg/dL 137* 250* 181* 162* 174* 229* 168*   EGFR mL/min/1.73 28.3* 29.0* 26.3* 27.8* 27.8* 32.1* 32.2*   CALCIUM mg/dL 8.7 8.5* 8.4* 8.3* 8.2* 8.5* 8.8   MAGNESIUM mg/dL 1.7  --  1.7 1.8 1.6 1.6 1.5*   BILIRUBIN mg/dL  --  0.4  --   --   --   --   --    ALK PHOS U/L  --  127*  --   --   --   --   --    ALT (SGPT) U/L  --  21  --   --   --   --   --    AST (SGOT) U/L  --  17  --   --   --   --   --        Assessment & Plan   Assessment / Plan     Active Hospital Problems:  Active Hospital Problems    Diagnosis     **PNA (pneumonia)     1. Incision and drainage of posterior perianal abscess 2. Sharp excisional debridement through skin and  subcutaneous tissue in the posterior perianal area, 9 x 6 x 1 cm     Perianal abscess     Wound of gluteal cleft     Bacterial pneumonia     Bronchiectasis with acute exacerbation     Pneumonia due to Pseudomonas species     Bronchiectasis without complication     COPD exacerbation       59-year male with past medical history of CKD stage III with baseline creatinine 1.7-2, insulin-dependent diabetes, hypertension, COPD, history of pulmonary embolism on anticoagulation who has had a prolonged hospital course primarily after he presented to 2 shortness of breath and was found to have bronchiectasis with bronch showing multidrug-resistant Pseudomonas requiring IV antibiotics and abdominal pain with anal abscess status post incision and drainage and now status post loop colostomy for incarcerated umbilical hernia in the first week of February.  Patient requiring Zosyn and tobramycin.  Patient's creatinine elevated 2.5 likely in the setting of diuresis.  CT of the abdomen pelvis showed normal kidneys    Plan:   Continue with Bumex 2 mg p.o. twice daily  Continue with carvedilol 25 and nifedipine 30 mg XL  Urine analysis, UPCR and UACR ordered    Thank you for involving the care of the patient.  Will continue to follow along    Electronically signed by Nancy Damon MD, 02/11/25, 10:56 AM EST.

## 2025-02-11 NOTE — PLAN OF CARE
Goal Outcome Evaluation:  Plan of Care Reviewed With: patient        Progress: no change  Outcome Evaluation: pt q2 turned, no complaints of pain. tolerated wound care well. BG treated per MAR.

## 2025-02-11 NOTE — PLAN OF CARE
Goal Outcome Evaluation:           Progress: no change  Outcome Evaluation: No acute changes to patient condition this shift. No signs or symptoms of distress expressed or observed. Wound care performed on coccyx as ordered. Resting in bed with eyes closed and visible respirations.

## 2025-02-11 NOTE — SIGNIFICANT NOTE
Wound Eval / Progress Noted    YAIMA Stockton     Patient Name: Preston Wallis  : 1965  MRN: [id number]  Today's Date: 2025                 Admit Date: 2025    Visit Dx:    ICD-10-CM ICD-9-CM   1. Pneumonia due to Pseudomonas species, unspecified laterality, unspecified part of lung  J15.1 482.1   2. Urinary tract infection associated with indwelling urethral catheter, initial encounter  T83.511A 996.64    N39.0 599.0   3. Perianal abscess  K61.0 566   4. COPD exacerbation  J44.1 491.21   5. Bronchiectasis without complication  J47.9 494.0   6. Allergy, initial encounter  T78.40XA 995.3   7. Acute hypoxic on chronic hypercapnic respiratory failure  J96.01 518.84    J96.12    8. Tobacco abuse, in remission  F17.201 305.1   9. Chronic dyspnea  R06.09 786.09   10. Obstructive sleep apnea  G47.33 327.23   11. Chronic respiratory failure with hypoxia and hypercapnia  J96.11 518.83    J96.12 799.02     786.09   12. Chronic obstructive pulmonary disease, unspecified COPD type  J44.9 496   13. Wound of gluteal cleft, unspecified laterality, subsequent encounter  S31.809D V58.89     877.0   14. Pneumonia of both lower lobes due to Pneumocystis jirovecii  B59 136.3   15. Bacterial pneumonia  J15.9 482.9   16. Bronchiectasis with acute exacerbation  J47.1 494.1   17. Pneumonia of both lungs due to Pseudomonas species, unspecified part of lung  J15.1 482.1   18. Difficulty walking  R26.2 719.7         PNA (pneumonia)    COPD exacerbation    Bronchiectasis without complication    Pneumonia due to Pseudomonas species    Bronchiectasis with acute exacerbation    Bacterial pneumonia    Perianal abscess    1. Incision and drainage of posterior perianal abscess 2. Sharp excisional debridement through skin and subcutaneous tissue in the posterior perianal area, 9 x 6 x 1 cm    Wound of gluteal cleft        Past Medical History:   Diagnosis Date    1. Incision and drainage of posterior perianal abscess 2. Sharp excisional  "debridement through skin and subcutaneous tissue in the posterior perianal area, 9 x 6 x 1 cm 01/26/2025    Age-related cognitive decline     Allergic contact dermatitis     Allergies     Anemia     Bedbound     11/2023 \"MY LEG MUSCLES STOPPED WORKING\"    Bronchiectasis with acute lower respiratory infection     Charcot foot due to diabetes mellitus 09/10/2013    Chronic diastolic (congestive) heart failure     Chronic kidney disease     Chronic respiratory failure with hypoxia     Closed supracondylar fracture of femur 01/12/2022    COPD (chronic obstructive pulmonary disease)     Deep vein thrombosis (DVT) of lower extremity associated with air travel 01/13/2023    Dependence on supplemental oxygen     Eczema     Erectile dysfunction     due to organic reasons    Essential (primary) hypertension     Fracture     closed fracture of other tarsal and metatarsal bones    Fracture of proximal humerus 01/13/2023    GERD without esophagitis     High risk medication use     Hypercholesteremia     Hypomagnesemia     Infected stasis ulcer of left lower extremity 01/13/2023    Insomnia     Low back pain     Major depressive disorder     Morbid (severe) obesity due to excess calories     MRSA pneumonia     Muscle weakness     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 without evidence of necrosis     Obstructive sleep apnea (adult) (pediatric)     On home O2     REPORTS WEARING 2L/NC AAT    Other forms of dyspnea     Other long term (current) drug therapy     Other specified noninfective gastroenteritis and colitis     Other spondylosis, lumbar region     Pain in both knees     Paroxysmal atrial fibrillation     Peripheral neuropathy     attributed to type 2 diabetes    Pneumonia, unspecified organism     Polyneuropathy     Rash and other nonspecific skin eruption     Self-catheterizes urinary bladder     EVERY 4 HOURS    Smoking     \"SOMETIMES\"    Syncope and collapse     Tachycardia  "    Tinnitus 01/13/2023    Type 1 diabetes mellitus with diabetic chronic kidney disease     Type 2 diabetes mellitus     Unspecified fall, initial encounter     Urinary retention         Past Surgical History:   Procedure Laterality Date    BRONCHOSCOPY N/A 1/28/2025    Procedure: BRONCHOSCOPY: BAL: insertion of lighted instrument to view inside the lung;  Surgeon: Walter Nicole DO;  Location: [facility] MAIN OR;  Service: Pulmonary;  Laterality: N/A;    BRONCHOSCOPY N/A 2/3/2025    Procedure: BRONCHOSCOPY WITH BRONCHOALVEOLAR LAVAGE, POSSIBLE BIOPSY, BRUSHING, WASHING, AIRWAY INSPECTION: insertion of lighted instrument to view inside the lung;  Surgeon: Chadd Swan MD;  Location: [facility] MAIN OR;  Service: Pulmonary;  Laterality: N/A;    CARDIAC CATHETERIZATION Left 8/15/2024    Procedure: Carbon dioxide aortogram with left leg angiogram, possible angioplasty or stenting;  Surgeon: Moshe Willson MD;  Location: [facility] CATH INVASIVE LOCATION;  Service: Vascular;  Laterality: Left;    CHOLECYSTECTOMY      COLOSTOMY N/A 2/6/2025    Procedure: COLOSTOMY LAPAROSCOPIC; plain text: creation of colostomy using small incisions;  Surgeon: Emerson Canada MD;  Location: [facility] OR [facility];  Service: General;  Laterality: N/A;    CYSTOSCOPY      FEMUR SURGERY Left     Shravan placed    INCISION AND DRAINAGE ABSCESS N/A 1/26/2025    Procedure: INCISION AND DRAINAGE ABSCESS; plain text: incision and drain pus from buttocks wound;  Surgeon: Emerson Cnaada MD;  Location: [facility] OR [facility];  Service: General;  Laterality: N/A;    KNEE SURGERY Left     OTHER SURGICAL HISTORY Left     venous port, REMOVED    PORTACATH PLACEMENT Right     TIBIAL PLATEAU OPEN REDUCTION INTERNAL FIXATION Left 12/22/2023    Procedure: TIBIAL PLATEAU OPEN REDUCTION INTERNAL FIXATION;  Surgeon: Hugo Kline MD;  Location: [facility] OR;  Service: Orthopedics;  Laterality: Left;    TONSILLECTOMY AND ADENOIDECTOMY           Physical  Assessment:  Wound 02/16/24 1700 Left posterior foot Pressure Injury (Active)   Wound Image   02/11/25 1238   Pressure Injury Stage U 02/11/25 1238   Dressing Appearance dry;intact 02/11/25 1238   Closure None 02/11/25 1238   Base dry;black;eschar;moist;yellow;slough;red 02/11/25 1238   Black (%), Wound Tissue Color 75 02/11/25 1238   Red (%), Wound Tissue Color 10 02/11/25 1238   Yellow (%), Wound Tissue Color 15 02/11/25 1238   Periwound dry;pink 02/11/25 1238   Periwound Temperature warm 02/11/25 1238   Periwound Skin Turgor soft 02/11/25 1238   Edges open 02/11/25 1238   Wound Length (cm) 3.2 cm 02/11/25 1238   Wound Width (cm) 3.2 cm 02/11/25 1238   Wound Depth (cm) 0.2 cm 02/11/25 1238   Wound Surface Area (cm^2) 10.24 cm^2 02/11/25 1238   Wound Volume (cm^3) 2.048 cm^3 02/11/25 1238   Drainage Characteristics/Odor serosanguineous 02/11/25 1238   Drainage Amount scant 02/11/25 1238   Care, Wound cleansed with;sterile normal saline 02/11/25 1238   Dressing Care dressing removed;dressing applied;gauze, wet-to-moist;silicone border foam 02/11/25 1238   Periwound Care absorptive dressing applied 02/11/25 1238       Wound 01/23/25 2330 coccyx pressure injury (Active)   Wound Image    02/11/25 1238   Pressure Injury Stage 3 02/11/25 1238   Dressing Appearance dry;intact 02/11/25 1238   Closure None 02/11/25 1238   Base moist;maroon/purple;red;yellow;slough 02/11/25 1238   Black (%), Wound Tissue Color 15 02/10/25 2245   Red (%), Wound Tissue Color 25 02/11/25 1238   Yellow (%), Wound Tissue Color 75 02/11/25 1238   Periwound dry;pink;redness;pale white 02/11/25 1238   Periwound Temperature warm 02/11/25 1238   Periwound Skin Turgor soft 02/11/25 1238   Edges open 02/11/25 1238   Wound Length (cm) 8 cm 02/11/25 1238   Wound Width (cm) 6 cm 02/11/25 1238   Wound Depth (cm) 2.1 cm 02/11/25 1238   Wound Surface Area (cm^2) 48 cm^2 02/11/25 1238   Wound Volume (cm^3) 100.8 cm^3 02/11/25 1238   Drainage  Characteristics/Odor serosanguineous 02/11/25 1238   Drainage Amount scant 02/11/25 1238   Care, Wound cleansed with;irrigated with;sterile normal saline 02/11/25 1238   Dressing Care dressing removed;dressing applied;other (see comments);silicone border foam 02/11/25 1238   Periwound Care absorptive dressing applied;dry periwound area maintained 02/11/25 1238       Wound Left lower arm skin tear (Active)   Wound Image   02/11/25 1238   Dressing Appearance dry;intact 02/11/25 1238   Closure None 02/11/25 1238   Base dry;scab 02/11/25 1238   Periwound dry;ecchymotic 02/11/25 1238   Periwound Temperature warm 02/11/25 1238   Periwound Skin Turgor soft 02/11/25 1238   Edges rolled/closed 02/11/25 1238   Drainage Amount none 02/11/25 1238   Care, Wound cleansed with;sterile normal saline 02/11/25 1238   Dressing Care dressing removed;open to air 02/11/25 1238   Periwound Care dry periwound area maintained 02/11/25 1238       Wound 02/04/25 1200 Left anterior thigh unspecified (Active)   Wound Image   02/11/25 1238   Dressing Appearance dry;intact 02/11/25 1238   Closure None 02/11/25 1238   Base dry;scab 02/11/25 1238   Periwound dry;pink 02/11/25 1238   Periwound Temperature warm 02/11/25 1238   Periwound Skin Turgor soft 02/11/25 1238   Edges rolled/closed 02/11/25 1238   Drainage Amount none 02/11/25 1238   Care, Wound cleansed with;sterile normal saline 02/11/25 1238   Dressing Care dressing removed;open to air 02/11/25 1238   Periwound Care dry periwound area maintained 02/11/25 1238       Wound 02/11/25 1238 Left distal plantar unspecified (Active)   Wound Image   02/11/25 1238   Dressing Appearance dry;intact 02/11/25 1238   Closure None 02/11/25 1238   Base closed/resurfaced;dry;blanchable;red 02/11/25 1238   Periwound dry;pink 02/11/25 1238   Periwound Temperature warm 02/11/25 1238   Periwound Skin Turgor soft 02/11/25 1238   Edges rolled/closed 02/11/25 1238   Drainage Amount none 02/11/25 1238   Care, Wound  "cleansed with;sterile normal saline 02/11/25 1238   Dressing Care dressing removed;dressing applied;non-adherent;petroleum-based;gauze;silicone border foam 02/11/25 1238   Periwound Care dry periwound area maintained 02/11/25 1238      02/11/25 1238   Colostomy   Placement date: If unknown, DO NOT use \"Add Comment\" note/Placement time: If unknown, DO NOT use \"Add Comment\" note: 02/06/25 1322   Hand Hygiene Completed: Yes   Stomal Appliance 1 piece;Clean;Dry;Intact;Drainable   Stoma Appearance irregular;moist;dusky;protruding above skin level;bridge in place   Peristomal Assessment AMINA   Stoma Function stool;flatus   Stool Color brown   Stool Consistency soft;formed   Treatment Placement checked   Output (mL) 225 mL     Wound Check / Follow-up:  Patient seen today for ostomy education and wound follow up. Patient underwent an incision and drainage of perianal abscess on 1/26/2025. Patient underwent diverting laprascopic colostomy placement on 2/6/25 to assist with healing of his perianal abscess. Patient reports he has been bedridden for approximately 2 to 3 years due to the pressure injury to his left heel. Reviewed ostomy education with patient to include managing ostomy, diet management with ostomy, and when to perform a pouch change; questions encouraged and answered.     Loop colostomy noted to lower aspect of abdomen. Pouching system is intact with no signs of lifting or leaking. Pouching system emptied and cleansed with water.  225 mL of soft/formed brown stool emptied from pouching system.  Pouching system was cleansed with water to visualize stoma.  Stoma is noted to be irregular, moist, dusky, and protruding above skin level with an ostomy bar in place.  Unable to visualize peristomal tissue.  Recommend continue diligent ostomy care.  Plan for wife to perform ostomy pouching system change tomorrow with assistance from wound care RN.     Skin tear to left posterior lower arm.  Wound base is dry with crusted " red tissue.  Periwound tissue is dry and ecchymotic.  Cleansed with normal saline and gauze, blotted dry.  Recommending quality skin care and hygiene.      Suspected traumatic injury to left anterior thigh.  Wound base is dry with thin, crusted tan tissue.  Periwound tissue is dry and pink.  Cleansed with normal saline and gauze, blotted dry.  Recommending quality skin care and hygiene.     Unstageable pressure injury to left heel.  Wound base is primarily covered with dry, black eschar, with visible moist yellow slough and red tissue.  Edges of eschar are noted to be lifting. Recommending daily dressing changes with a nickel thick layer of Santyl being applied over wound base, covered with normal saline moist gauze, and secured with a silicone border dressing. Keep heel floated at all times with use of heel offloading boot or cushion.     Left plantar forefoot presents with an area of dry blanchable red scar tissue, with dry pink tissue surrounding.  Patient reports previous wound at this site.  Cleansed with normal saline and gauze, blotted dry.  Recommending every 3-day dressing changes with non-adherent, petroleum-based gauze and silicone border dressing securement to protect scar tissue.      Stage III pressure injury noted to the perianal region post incision and drainage.  Wound base is primarily covered with moist yellow slough, with moist red and maroon tissue visible within.  Periwound tissue is dry with blanchable pink/redness and scattered pale white tissue.  No odor is noted to wound base.  Cleansed and irrigated with normal saline and gauze, blotted dry.  Recommending daily dressing changes with a nickel thick layer of Santyl being applied over wound base, wound base being lightly filled/covered with normal saline moistened 2 inch gauze roll, and silicone border dressing securement.  Recommending quality skin care and hygiene to periwound tissue surrounding wound dressing borders with application of  blue top moisture barrier 4 times a day and as needed for incontinence.  Implement every 2 hour turns and offload at all times.  Keep patient clean, dry, and free from all moisture.       Impression: Surgical incision with delayed closure/Stage III pressure injury to perianal region, skin tear to left lower arm, suspected traumatic injury to left thigh, unstageable pressure injury to left heel, left plantar forefoot with blanchable red scar tissue, loop colostomy.     Short term goals: Regain skin integrity, skin protection, moisture prevention, pressure reduction, quality skin care and hygiene, every 3-day dressing change, daily dressing changes, colostomy management.    Amparo Novoa, RN    2/11/2025    18:42 EST

## 2025-02-11 NOTE — PROGRESS NOTES
Pulmonary / Critical Care Progress Note      Patient Name: Preston Wallis  : 1965  MRN: [id number]  Primary Care Physician:  Kimmy Riley MD  Date of admission: 2025    Subjective   Subjective   Follow-up for acute on chronic respiratory failure hypoxic    No acute events overnight.    This morning,  Resting in bed  Overall feeling better  Baseline dyspnea  On baseline 2 L NC  Tolerating [place]  Tolerating diet  Remains weak and fatigued      Objective   Objective     Vitals:   Temp:  [97.8 °F (36.6 °C)-98.7 °F (37.1 °C)] 98.4 °F (36.9 °C)  Heart Rate:  [74-82] 81  Resp:  [18-20] 20  BP: (136-150)/(50-67) 136/63  Flow (L/min) (Oxygen Therapy):  [2] 2    Physical Exam   Vital Signs Reviewed   General: Chronically ill-appearing, obese male, Alert, NAD, resting in bed  Chest: Diminished bilaterally with scattered rhonchi, no work of breathing noted on baseline 2 L NC  CV: RRR, no MGR, pulses 2+, equal.  Abd:  Soft, appropriately tender, ND, colostomy bag in place  EXT:  no clubbing, no cyanosis, no edema  Neuro:  A&Ox3, CN grossly intact, no focal deficits.  Skin: No rashes or lesions noted      Result Review    Result Review:  I have personally reviewed the results from the time of this admission to 2025 07:42 EST and agree with these findings:  [x]  Laboratory  [x]  Microbiology  [x]  Radiology  []  EKG/Telemetry   []  Cardiology/Vascular   []  Pathology  []  Old records  []  Other:  Most notable findings include:        Lab 25  0430 02/10/25  1014 02/10/25  0411 25  0346 25  0548 25  0547 25  2047 25  1139 25  0500 25  2336 25  0530   WBC 13.36*  --  13.84* 14.76* 13.33*  --   --  15.00* 18.11* 22.49* 12.92*   HEMOGLOBIN 9.0*  --  9.1* 8.5* 9.2*  --  9.0* 7.1* 6.0* 7.0* 8.2*   HEMATOCRIT 28.2*  --  28.6* 26.9* 29.3*  --  27.9* 22.6* 19.3* 22.6* 25.8*   PLATELETS 279  --  229 192 173  --   --  176 190 196 212   SODIUM 139 138 138 135*  --   135*  --   --  135*  --  134*   POTASSIUM 4.1 4.1 4.3 3.9  --  4.0  --   --  4.6  --  4.4   CHLORIDE 98 96* 98 96*  --  97*  --   --  95*  --  93*   CO2 29.5* 30.8* 30.6* 29.2*  --  28.4  --   --  31.8*  --  34.6*   BUN 60* 58* 63* 58*  --  53*  --   --  55*  --  47*   CREATININE 2.54* 2.49* 2.70* 2.58*  --  2.58*  --   --  2.29*  --  2.28*   GLUCOSE 137* 250* 181* 162*  --  174*  --   --  229*  --  168*   CALCIUM 8.7 8.5* 8.4* 8.3*  --  8.2*  --   --  8.5*  --  8.8   PHOSPHORUS 2.5  --  2.6 2.7  --  3.4  --   --  4.2  --  2.7   TOTAL PROTEIN  --  6.3  --   --   --   --   --   --   --   --   --    ALBUMIN  --  2.8*  --   --   --   --   --   --   --   --   --    GLOBULIN  --  3.5  --   --   --   --   --   --   --   --   --      CT Chest Without Contrast Diagnostic    Result Date: 1/24/2025  CT CHEST WO CONTRAST DIAGNOSTIC Date of Exam: 1/24/2025 7:48 AM EST Indication: Shortness of breath.. Comparison: 10/22/2024 Technique: Axial CT images were obtained of the chest without contrast administration.  Reconstructed coronal and sagittal images were also obtained. Automated exposure control and iterative construction methods were used. Findings: There is a new somewhat spiculated nodular focus in the superior left lower lobe image #66 of series 2. The rapid interval development suggests an infectious or inflammatory process. Short-term follow-up CT suggested. Stable appearance of verrucous and cystic bronchiectasis involving all lobes of the lungs, but greatest in the right upper lobe and lower lobes. There are again adjacent micronodular densities peribronchial distribution suggesting suggesting an infectious or inflammatory process. Nontuberculous mycobacterial infection or other atypical agent would be a top considerations. There is layering mucoid material in the trachea and right main bronchus. There is also probable mucoid impaction within areas of bronchiectasis noted. Small bilateral pleural effusions are noted,  slightly increased from prior. There is consolidation in the lung bases bilaterally which may be due to atelectasis or additional infiltrates. Right subclavian Efestn-n-Qcom catheter again noted. Retained tubing from left subclavian Cpejkr-n-Bxyv catheter again noted. There is an increasing pericardial effusion, measuring up to 2.6 cm posteriorly on the right. Enlarged right paratracheal lymph node is noted measuring up to 1.4 cm, likely reactive. Hilar adenopathy is also suspected, but not well characterized due to noncontrast technique. Thyroid is enlarged and heterogeneous which may be due to goitrous change. Limited imaging through the upper abdomen demonstrates postcholecystectomy change. The adrenals have a grossly normal morphology. There is degenerative change in the spine.     Impression: Impression: 1.There is a new somewhat spiculated nodular focus in the superior left lower lobe image #66 of series. The rapid interval development suggests an infectious or inflammatory process. 3-month follow-up CT recommended per Fleischner guidelines. 2.Stable appearance of verrucous and cystic bronchiectasis involving all lobes of the lungs, but greatest in the right upper lobe and lower lobes. There are again adjacent micronodular densities in a peribronchial distribution suggesting an infectious or  inflammatory process. Nontuberculous mycobacterial infection or other atypical agent would be a top considerations. 3.Small bilateral pleural effusions, slightly increased from prior. There is consolidation in the lung bases bilaterally which may be due to atelectasis or additional infiltrates. 4.Increasing pericardial effusion, now measuring up to 2.6 cm posteriorly on the right. 5.Enlarged right paratracheal lymph node measuring up to 1.4 cm, likely reactive. Hilar adenopathy is also suspected, but not well characterized due to noncontrast technique. 6.Additional findings as given above. Electronically Signed: Deshawn  MD Charles  1/24/2025 9:43 AM EST  Workstation ID: [id number]     Most recent chest x-ray with by lateral upper lobe airspace disease consistent bronchiectasis and trace bilateral effusions  Sputum culture with multidrug-resistant Pseudomonas    Assessment & Plan   Assessment / Plan     Active Hospital Problems:  Active Hospital Problems    Diagnosis     **PNA (pneumonia)     1. Incision and drainage of posterior perianal abscess 2. Sharp excisional debridement through skin and subcutaneous tissue in the posterior perianal area, 9 x 6 x 1 cm     Perianal abscess     Wound of gluteal cleft     Bacterial pneumonia     Bronchiectasis with acute exacerbation     Pneumonia due to Pseudomonas species     Bronchiectasis without complication     COPD exacerbation      Impression:  Acute on chronic hypoxemic respiratory failure  Multidrug-resistant Pseudomonas pneumonia  Bronchiectasis with acute exacerbation  Acute exacerbation of COPD  Paroxysmal atrial fibrillation  Perianal abscess status post drainage  Status post diverting colostomy  Hypomagnesemia  Class II obesity with BMI 35.1    Plan:  Goal SPO2 greater than 90%.  Wears 2 L of oxygen at baseline  Continue NIPPV nightly with naps on current settings  Status post bronchoscopy with clearance of airways.  Positive for MDRO Pseudomonas infection  Continue 7 days of Zosyn per sensitivities for multidrug-resistant Pseudomonas pneumonia  Continue MOIZ nebs and cycle off/on in 30-day intervals for 6 months  Continue Brovana, Pulmicort and Yupelri  Continue oral diuretics to Bumex  Trend renal panels and electrolytes.  Replace magnesium IV  Encourage activity and incentive spirometer use  Appreciate general surgery assistance.  Continue wound care  Eliquis on hold for anemia.  Transfuse for hemoglobin less than 7  Patient would greatly benefit from rehab, however he refuses    VTE Prophylaxis:  Pharmacologic & mechanical VTE prophylaxis orders are present.    CODE STATUS:    Code Status (Patient has no pulse and is not breathing): CPR (Attempt to Resuscitate)  Medical Interventions (Patient has pulse or is breathing): Full Support      Labs, imaging, microbiology, notes and medications personally reviewed  Discussed with primary    I, Dr. Severiano Carolina, have spent more than 50% of the total time managing the patient in this encounter today.  This included personally reviewing all pertinent labs, imaging, microbiology and documentation. Also discussing the case with the patient and any available family, the admitting physician and any available ancillary staff.    Electronically signed by CON Camilo, 02/11/25, 9:49 AM EST.  Electronically signed by Severiano Carolina MD, 02/11/25, 10:22 AM EST.

## 2025-02-12 LAB
ANION GAP SERPL CALCULATED.3IONS-SCNC: 10.3 MMOL/L (ref 5–15)
BASOPHILS # BLD AUTO: 0.05 10*3/MM3 (ref 0–0.2)
BASOPHILS NFR BLD AUTO: 0.4 % (ref 0–1.5)
BUN SERPL-MCNC: 59 MG/DL (ref 6–20)
BUN/CREAT SERPL: 23.7 (ref 7–25)
CALCIUM SPEC-SCNC: 8.7 MG/DL (ref 8.6–10.5)
CHLORIDE SERPL-SCNC: 96 MMOL/L (ref 98–107)
CO2 SERPL-SCNC: 29.7 MMOL/L (ref 22–29)
CREAT SERPL-MCNC: 2.49 MG/DL (ref 0.76–1.27)
DEPRECATED RDW RBC AUTO: 48.6 FL (ref 37–54)
EGFRCR SERPLBLD CKD-EPI 2021: 29 ML/MIN/1.73
EOSINOPHIL # BLD AUTO: 0.99 10*3/MM3 (ref 0–0.4)
EOSINOPHIL NFR BLD AUTO: 7.1 % (ref 0.3–6.2)
ERYTHROCYTE [DISTWIDTH] IN BLOOD BY AUTOMATED COUNT: 14.9 % (ref 12.3–15.4)
GLUCOSE BLDC GLUCOMTR-MCNC: 138 MG/DL (ref 70–99)
GLUCOSE BLDC GLUCOMTR-MCNC: 199 MG/DL (ref 70–99)
GLUCOSE BLDC GLUCOMTR-MCNC: 208 MG/DL (ref 70–99)
GLUCOSE BLDC GLUCOMTR-MCNC: 250 MG/DL (ref 70–99)
GLUCOSE SERPL-MCNC: 173 MG/DL (ref 65–99)
HCT VFR BLD AUTO: 29.2 % (ref 37.5–51)
HGB BLD-MCNC: 9.4 G/DL (ref 13–17.7)
IMM GRANULOCYTES # BLD AUTO: 0.07 10*3/MM3 (ref 0–0.05)
IMM GRANULOCYTES NFR BLD AUTO: 0.5 % (ref 0–0.5)
LYMPHOCYTES # BLD AUTO: 1.84 10*3/MM3 (ref 0.7–3.1)
LYMPHOCYTES NFR BLD AUTO: 13.1 % (ref 19.6–45.3)
MAGNESIUM SERPL-MCNC: 2.1 MG/DL (ref 1.6–2.6)
MCH RBC QN AUTO: 29.1 PG (ref 26.6–33)
MCHC RBC AUTO-ENTMCNC: 32.2 G/DL (ref 31.5–35.7)
MCV RBC AUTO: 90.4 FL (ref 79–97)
MONOCYTES # BLD AUTO: 1.28 10*3/MM3 (ref 0.1–0.9)
MONOCYTES NFR BLD AUTO: 9.1 % (ref 5–12)
NEUTROPHILS NFR BLD AUTO: 69.8 % (ref 42.7–76)
NEUTROPHILS NFR BLD AUTO: 9.81 10*3/MM3 (ref 1.7–7)
NRBC BLD AUTO-RTO: 0 /100 WBC (ref 0–0.2)
PHOSPHATE SERPL-MCNC: 2.8 MG/DL (ref 2.5–4.5)
PLATELET # BLD AUTO: 325 10*3/MM3 (ref 140–450)
PMV BLD AUTO: 8.8 FL (ref 6–12)
POTASSIUM SERPL-SCNC: 4 MMOL/L (ref 3.5–5.2)
RBC # BLD AUTO: 3.23 10*6/MM3 (ref 4.14–5.8)
SODIUM SERPL-SCNC: 136 MMOL/L (ref 136–145)
WBC NRBC COR # BLD AUTO: 14.04 10*3/MM3 (ref 3.4–10.8)

## 2025-02-12 PROCEDURE — 80048 BASIC METABOLIC PNL TOTAL CA: CPT | Performed by: STUDENT IN AN ORGANIZED HEALTH CARE EDUCATION/TRAINING PROGRAM

## 2025-02-12 PROCEDURE — 99232 SBSQ HOSP IP/OBS MODERATE 35: CPT | Performed by: INTERNAL MEDICINE

## 2025-02-12 PROCEDURE — 85025 COMPLETE CBC W/AUTO DIFF WBC: CPT | Performed by: STUDENT IN AN ORGANIZED HEALTH CARE EDUCATION/TRAINING PROGRAM

## 2025-02-12 PROCEDURE — 63710000001 INSULIN LISPRO (HUMAN) PER 5 UNITS: Performed by: STUDENT IN AN ORGANIZED HEALTH CARE EDUCATION/TRAINING PROGRAM

## 2025-02-12 PROCEDURE — 25010000002 PIPERACILLIN SOD-TAZOBACTAM PER 1 G: Performed by: STUDENT IN AN ORGANIZED HEALTH CARE EDUCATION/TRAINING PROGRAM

## 2025-02-12 PROCEDURE — 94799 UNLISTED PULMONARY SVC/PX: CPT

## 2025-02-12 PROCEDURE — 25010000002 ONDANSETRON PER 1 MG: Performed by: PHYSICIAN ASSISTANT

## 2025-02-12 PROCEDURE — 84100 ASSAY OF PHOSPHORUS: CPT | Performed by: STUDENT IN AN ORGANIZED HEALTH CARE EDUCATION/TRAINING PROGRAM

## 2025-02-12 PROCEDURE — 94664 DEMO&/EVAL PT USE INHALER: CPT

## 2025-02-12 PROCEDURE — 63710000001 REVEFENACIN 175 MCG/3ML SOLUTION: Performed by: STUDENT IN AN ORGANIZED HEALTH CARE EDUCATION/TRAINING PROGRAM

## 2025-02-12 PROCEDURE — 82948 REAGENT STRIP/BLOOD GLUCOSE: CPT

## 2025-02-12 PROCEDURE — 94761 N-INVAS EAR/PLS OXIMETRY MLT: CPT

## 2025-02-12 PROCEDURE — 83735 ASSAY OF MAGNESIUM: CPT | Performed by: STUDENT IN AN ORGANIZED HEALTH CARE EDUCATION/TRAINING PROGRAM

## 2025-02-12 PROCEDURE — 99233 SBSQ HOSP IP/OBS HIGH 50: CPT | Performed by: INTERNAL MEDICINE

## 2025-02-12 PROCEDURE — 82948 REAGENT STRIP/BLOOD GLUCOSE: CPT | Performed by: STUDENT IN AN ORGANIZED HEALTH CARE EDUCATION/TRAINING PROGRAM

## 2025-02-12 RX ORDER — LOSARTAN POTASSIUM 50 MG/1
50 TABLET ORAL
Status: DISCONTINUED | OUTPATIENT
Start: 2025-02-12 | End: 2025-02-20 | Stop reason: HOSPADM

## 2025-02-12 RX ORDER — ONDANSETRON 2 MG/ML
4 INJECTION INTRAMUSCULAR; INTRAVENOUS EVERY 4 HOURS PRN
Status: DISCONTINUED | OUTPATIENT
Start: 2025-02-12 | End: 2025-02-16

## 2025-02-12 RX ADMIN — POLYETHYLENE GLYCOL 3350 17 G: 17 POWDER, FOR SOLUTION ORAL at 09:46

## 2025-02-12 RX ADMIN — ARFORMOTEROL TARTRATE 15 MCG: 15 SOLUTION RESPIRATORY (INHALATION) at 10:48

## 2025-02-12 RX ADMIN — CARVEDILOL 25 MG: 25 TABLET, FILM COATED ORAL at 09:46

## 2025-02-12 RX ADMIN — INSULIN LISPRO 6 UNITS: 100 INJECTION, SOLUTION INTRAVENOUS; SUBCUTANEOUS at 11:37

## 2025-02-12 RX ADMIN — INSULIN LISPRO 2 UNITS: 100 INJECTION, SOLUTION INTRAVENOUS; SUBCUTANEOUS at 17:06

## 2025-02-12 RX ADMIN — FINASTERIDE 5 MG: 5 TABLET, FILM COATED ORAL at 09:46

## 2025-02-12 RX ADMIN — ASPIRIN 81 MG: 81 TABLET, COATED ORAL at 07:47

## 2025-02-12 RX ADMIN — INSULIN LISPRO 4 UNITS: 100 INJECTION, SOLUTION INTRAVENOUS; SUBCUTANEOUS at 20:52

## 2025-02-12 RX ADMIN — BUSPIRONE HYDROCHLORIDE 15 MG: 5 TABLET ORAL at 20:14

## 2025-02-12 RX ADMIN — BUDESONIDE 0.5 MG: 0.5 INHALANT RESPIRATORY (INHALATION) at 10:48

## 2025-02-12 RX ADMIN — BISACODYL 5 MG: 5 TABLET, COATED ORAL at 09:46

## 2025-02-12 RX ADMIN — BUMETANIDE 2 MG: 1 TABLET ORAL at 20:14

## 2025-02-12 RX ADMIN — Medication 10 ML: at 09:47

## 2025-02-12 RX ADMIN — BUSPIRONE HYDROCHLORIDE 15 MG: 5 TABLET ORAL at 09:46

## 2025-02-12 RX ADMIN — Medication 10 ML: at 20:15

## 2025-02-12 RX ADMIN — ATORVASTATIN CALCIUM 20 MG: 10 TABLET, FILM COATED ORAL at 09:46

## 2025-02-12 RX ADMIN — Medication 10 ML: at 20:14

## 2025-02-12 RX ADMIN — MONTELUKAST 10 MG: 10 TABLET, FILM COATED ORAL at 20:14

## 2025-02-12 RX ADMIN — CARVEDILOL 25 MG: 25 TABLET, FILM COATED ORAL at 20:14

## 2025-02-12 RX ADMIN — ARFORMOTEROL TARTRATE 15 MCG: 15 SOLUTION RESPIRATORY (INHALATION) at 21:25

## 2025-02-12 RX ADMIN — GUAIFENESIN 600 MG: 600 TABLET ORAL at 09:46

## 2025-02-12 RX ADMIN — TOBRAMYCIN 300 MG: 300 SOLUTION RESPIRATORY (INHALATION) at 21:25

## 2025-02-12 RX ADMIN — BUDESONIDE 0.5 MG: 0.5 INHALANT RESPIRATORY (INHALATION) at 21:24

## 2025-02-12 RX ADMIN — PIPERACILLIN AND TAZOBACTAM 4.5 G: 4; .5 INJECTION, POWDER, FOR SOLUTION INTRAVENOUS; PARENTERAL at 14:40

## 2025-02-12 RX ADMIN — BUMETANIDE 2 MG: 1 TABLET ORAL at 09:46

## 2025-02-12 RX ADMIN — REVEFENACIN 175 MCG: 175 SOLUTION RESPIRATORY (INHALATION) at 10:48

## 2025-02-12 RX ADMIN — ONDANSETRON HYDROCHLORIDE 4 MG: 2 SOLUTION INTRAMUSCULAR; INTRAVENOUS at 20:52

## 2025-02-12 RX ADMIN — FERROUS SULFATE TAB 325 MG (65 MG ELEMENTAL FE) 325 MG: 325 (65 FE) TAB at 07:47

## 2025-02-12 RX ADMIN — LOSARTAN POTASSIUM 50 MG: 50 TABLET, FILM COATED ORAL at 09:46

## 2025-02-12 RX ADMIN — NIFEDIPINE 30 MG: 30 TABLET, FILM COATED, EXTENDED RELEASE ORAL at 07:47

## 2025-02-12 RX ADMIN — GUAIFENESIN 600 MG: 600 TABLET ORAL at 20:14

## 2025-02-12 RX ADMIN — COLLAGENASE SANTYL 1 APPLICATION: 250 OINTMENT TOPICAL at 10:19

## 2025-02-12 RX ADMIN — SENNOSIDES AND DOCUSATE SODIUM 2 TABLET: 50; 8.6 TABLET ORAL at 09:46

## 2025-02-12 RX ADMIN — TAMSULOSIN HYDROCHLORIDE 0.4 MG: 0.4 CAPSULE ORAL at 09:46

## 2025-02-12 RX ADMIN — PIPERACILLIN AND TAZOBACTAM 4.5 G: 4; .5 INJECTION, POWDER, FOR SOLUTION INTRAVENOUS; PARENTERAL at 07:48

## 2025-02-12 RX ADMIN — FAMOTIDINE 20 MG: 20 TABLET ORAL at 07:47

## 2025-02-12 RX ADMIN — TOBRAMYCIN 300 MG: 300 SOLUTION RESPIRATORY (INHALATION) at 10:48

## 2025-02-12 NOTE — PROGRESS NOTES
[facility] Progress Note      Patient Name:  Preston Wallis   MRN:  [id number]   :  1965   Date of Admission:  2025   Date of Service:  2025   PMD:  Kimmy Riley MD     Hospital Course:     59 y.o. male past medical history of COPD, hypertension, CHF, history of MDRO presents to the ED due to shortness of breath. Patient was discharged on  for MDRO pseudomonas pneumonia on IV ertapenem.      Patient admitted for shortness of breath.  Chest x-ray shows chronic bilateral lung infiltrates.  CT was performed to arrival of the chest which demonstrates new spiculated left lower lobe nodule, bronchiectasis throughout both lungs, micronodule or peribronchial densities suggestive of atypical infection, right paratracheal lymph node and enlarging pericardial effusion.  Pulmonology and cardiology services consulted.  TTE demonstrates very small pericardial effusion only, estimated EF 49%, with some grade 1 diastolic dysfunction left ventricle.  Wound care noted necrotic tissue perianal region.  CT abdomen pelvis was performed, 4.5 cm x 1.8 cm x 3.5 cm posterior anal abscess noted.  General surgery consulted, patient underwent incision and drainage .  S/p bronchoscopy 2025, BAL grew MDRO Pseudomonas, he completed 7 days of IV meropenem and discontinued on MOIZ nebs.  Wound culture positive for Enterococcus VRE and MDRO Citrobacter, currently receiving IV linezolid and IV Zosyn.  General surgery following and planning diverting colostomy for wound healing.  Underwent laparoscopic diverting loop colostomy procedure along with primary repair of chronically incarcerated 3 cm umbilical hernia on 2025.  General surgery on board.     Interval Followup: Patient had clot with sanguinous output from his perianal wound, receiving wound care.  Colostomy site looks clean with minimal bloody output.  Hemoglobin 6 today, transfusing 3 units of PRBC.  Patient laying comfortably  in bed, no active complaints.  Leukocytosis improving.  Family at bedside, updated.       Consultants:    Neurosurgery  Pulmonology    Procedures:  Chest x-ray    Anti-infectives:    IV Zosyn    February 10, 2025    Chief Complaint: Patient with shortness of breath off and on since several days    Subjective:   Shortness of breath feels better today, seen by surgery had diverting colostomy.    February 11 , 2025    Chief Complaint: Patient with shortness of breath off and on since several days    Subjective: Feels better today less short of breath on 2 L nasal cannula has a lot of weakness and fatigue.  UA positive for yeast  Has worsening renal failure creatinine is 2.54.    February 12 , 2025:    Chief Complaint: Patient with shortness of breath off and on since several days    Subjective:   Colostomy has a good output, creatinine is stable.  O2 saturation is 100%    Review Of Systems:  GENERAL: Complains of improving weakness and fatigue no time is denies any weight loss appetite is normal.  HEENT:  Denies any rhinitis, no sore throat, no diplopia , hearing is normal  Respiratory: Complains of shortness of breath , no sweating d complaint yspnea on exertion , cough or sputum.  CVS:  Denies any chest pain , palpitation or syncope.  Gi:  No nausea, no vomiting, no diarrhea, no hematemesis , no melena , no rectal bleeding  :  No dysuria frequency , hematuria, no retention:  Musculoskeletal:  Denies any arthritis, or calf pain  Incision and drainage of posterior perianal abscess 2.          Sharp excisional debridement through skin and subcutaneous tissue in the posterior perianal area, 9 x 6 x 1 cm    Wound of gluteal cleft  Hematological:  No bleeding , no petechiae.  Skin:  Denies rash , pruritus , jaundice  Endocrine:  No polyuria , polydipsia , polyphagia.  CNS:  Denies any confusion , headache , or seizure disorder  Psychiatry:  No anxiety , or depression, no suicide ideation      Objective:  Vitals:     02/12/25 1042 02/12/25 1046 02/12/25 1050 02/12/25 1100   BP:    135/45   BP Location:    Left arm   Patient Position:    Lying   Pulse: 81 78 81 81   Resp: 18 18 18 18   Temp:    97.6 °F (36.4 °C)   TempSrc:    Oral   SpO2: 95%   100%   Weight:       Height:              Physical Exam:  GENERAL APPEARANCE: Alert and oriented.  Appears comfortable.  No acute distress  HEENT: Atraumatic, normocephalic,  PERRLA, mucous membranes moist  NECK: supple; no JVD or thyromegaly noted  CARDIOVASCULAR: Regular rate and rhythm, no murmurs appreciated; no edema present in BLE   RESPIRATORY: Improving Harsh vesicular breathing with scattered rhonchi and crepitation.  GASTROINTESTINAL:  Abdomen  soft; bowel sounds present, non-tender, non-distended, no organomegaly, no CVA tenderness   EXTREMITIES: Pulses equal bilaterally   MUSCULOSKELETAL:  Good muscle strength noted no obvious deformity appreciate.          PSYCH: Appropriate mood and affect  Neurologic:  Alert & oriented x 3.  Normal Mental status.  Normal Cranial Nervies.  EOMI.  FABIO.  Normal 5/5 muscular strength in both upper and lower extremities.  Normal sensation.  Normal and symmetric reflexes. Normal cerbellar function.  Normal Gait. Negative Babinski.    Labs Reviewed  CBC:    Lab Results   Component Value Date    WBC 14.04 (H) 02/12/2025    HGB 9.4 (L) 02/12/2025    HCT 29.2 (L) 02/12/2025    MCV 90.4 02/12/2025     CMP:    Lab Results   Component Value Date     02/12/2025    CO2 29.7 (H) 02/12/2025    GLUCOSE 173 (H) 02/12/2025    BUN 59 (H) 02/12/2025    CREATININE 2.49 (H) 02/12/2025    ALBUMIN 2.8 (L) 02/10/2025    CALCIUM 8.7 02/12/2025    AST 17 02/10/2025    ALT 21 02/10/2025     Lipis Panel:   Lab Results   Component Value Date    CHOL 116 01/25/2025    HDL 54 01/25/2025      INR:    Lab Results   Component Value Date    INR 1.21 (H) 02/16/2024     Cardiac:    Lab Results   Component Value Date    CKMB 14.07 (H) 02/22/2024     No results found for:  "\"BNP\"  Lactate:    Lab Results   Component Value Date    LACTATE 1.1 02/10/2025    LACTATE 0.9 01/23/2025    LACTATE 0.69 04/29/2024     Blood Culture:  @lastlabx(cult:2)@    Imaging:  XR Chest 1 View    Result Date: 2/5/2025  Narrative: XR CHEST 1 VW Date of Exam: 2/5/2025 7:13 AM EST Indication: Pneumonia Comparison: Chest radiograph dated 1/23/2025, CT chest dated 1/24/2025 Findings: The patient is rotated. There is a right-sided chest port with tip terminating at the upper SVC. There is a left-sided subclavian line with tip terminating also at the upper SVC. There is cardiomegaly. There is bilateral upper lobe airspace opacity corresponding to areas of known bronchiectasis seen on the prior examination. There is right basilar airspace disease. There are probable trace bilateral pleural effusions. No visible pneumothorax.     Impression: Impression: 1.Bilateral upper lobe airspace opacities corresponding to areas of bronchiectasis seen on the prior examination. 2.Right basilar airspace disease which may represent atelectasis or pneumonia. 3.Trace bilateral pleural effusions. 4.Cardiomegaly. Electronically Signed: Mynor Singleton  2/5/2025 7:47 AM EST  Workstation ID: [id number]    Stress Test With Myocardial Perfusion One Day    Result Date: 1/27/2025  Narrative:   Left ventricular ejection fraction is mildly reduced (Calculated EF = 40%).   Low probability of ischemia during this study, patient may had an apical infarct versus apical thinning..   Findings consistent with an equivocal ECG stress test.     CT Abdomen Pelvis Without Contrast    Result Date: 1/25/2025  Narrative: CT ABDOMEN PELVIS WO CONTRAST Date of Exam: 1/25/2025 10:15 AM EST Indication: perirectal abscess. Comparison: None available. Technique: Axial CT images were obtained of the abdomen and pelvis without the administration of contrast. Reconstructed coronal and sagittal images were also obtained. Automated exposure control and iterative " construction methods were used. Findings: LUNG BASES: Minimal basal atelectasis with trace effusions. Heart is enlarged. There is a 10 mm thickness pericardial effusion. LIVER:  Unremarkable parenchyma without focal lesion. BILIARY/GALLBLADDER: Cholecystectomy SPLEEN:  Unremarkable PANCREAS:  Unremarkable ADRENAL:  Unremarkable KIDNEYS: Mild bilateral renal cortical atrophy with no solid mass identified. No obstruction.  No calculus identified. GASTROINTESTINAL/MESENTERY:  No evidence of obstruction nor inflammation.  No evidence of acute appendicitis. There is minimal right paracolic free fluid. AORTA/IVC:  Normal caliber. RETROPERITONEUM/LYMPH NODES:  Unremarkable REPRODUCTIVE:  Unremarkable BLADDER: Doyle catheter is present. OSSEUS STRUCTURES: No acute osseous process is identified. There is a posterior perianal abscess measuring 4.5 cm maximum AP dimension by 1.8 cm maximum transverse dimension by 3.5 cm in maximum CC dimension. This extends to the deep intergluteal crease skin surface. No intrapelvic extension noted.     Impression: Impression: 1.There is a posterior perianal abscess as detailed above. No intrapelvic extension. 2.Cardiomegaly with relatively small pericardial effusion. There is bibasilar atelectasis with trace pleural effusions. 3.Other incidental nonemergent findings as detailed above. Electronically Signed: Rob Milner MD  1/25/2025 10:55 AM EST  Workstation ID: [id number]    Adult Transthoracic Echo Complete W/ Cont if Necessary Per Protocol    Result Date: 1/24/2025  Narrative:   Left ventricular systolic function is low normal. Calculated left ventricular EF = 49.4%   Left ventricular wall thickness is consistent with moderate concentric hypertrophy.   Left ventricular diastolic function is consistent with (grade I) impaired relaxation.   There is a small (<1cm) pericardial effusion adjacent to the right ventricle.     CT Chest Without Contrast Diagnostic    Result Date:  1/24/2025  Narrative: CT CHEST WO CONTRAST DIAGNOSTIC Date of Exam: 1/24/2025 7:48 AM EST Indication: Shortness of breath.. Comparison: 10/22/2024 Technique: Axial CT images were obtained of the chest without contrast administration.  Reconstructed coronal and sagittal images were also obtained. Automated exposure control and iterative construction methods were used. Findings: There is a new somewhat spiculated nodular focus in the superior left lower lobe image #66 of series 2. The rapid interval development suggests an infectious or inflammatory process. Short-term follow-up CT suggested. Stable appearance of verrucous and cystic bronchiectasis involving all lobes of the lungs, but greatest in the right upper lobe and lower lobes. There are again adjacent micronodular densities peribronchial distribution suggesting suggesting an infectious or inflammatory process. Nontuberculous mycobacterial infection or other atypical agent would be a top considerations. There is layering mucoid material in the trachea and right main bronchus. There is also probable mucoid impaction within areas of bronchiectasis noted. Small bilateral pleural effusions are noted, slightly increased from prior. There is consolidation in the lung bases bilaterally which may be due to atelectasis or additional infiltrates. Right subclavian Kubdll-v-Ypjw catheter again noted. Retained tubing from left subclavian Jjwbpx-g-Kuhy catheter again noted. There is an increasing pericardial effusion, measuring up to 2.6 cm posteriorly on the right. Enlarged right paratracheal lymph node is noted measuring up to 1.4 cm, likely reactive. Hilar adenopathy is also suspected, but not well characterized due to noncontrast technique. Thyroid is enlarged and heterogeneous which may be due to goitrous change. Limited imaging through the upper abdomen demonstrates postcholecystectomy change. The adrenals have a grossly normal morphology. There is degenerative change  in the spine.     Impression: Impression: 1.There is a new somewhat spiculated nodular focus in the superior left lower lobe image #66 of series. The rapid interval development suggests an infectious or inflammatory process. 3-month follow-up CT recommended per Fleischner guidelines. 2.Stable appearance of verrucous and cystic bronchiectasis involving all lobes of the lungs, but greatest in the right upper lobe and lower lobes. There are again adjacent micronodular densities in a peribronchial distribution suggesting an infectious or  inflammatory process. Nontuberculous mycobacterial infection or other atypical agent would be a top considerations. 3.Small bilateral pleural effusions, slightly increased from prior. There is consolidation in the lung bases bilaterally which may be due to atelectasis or additional infiltrates. 4.Increasing pericardial effusion, now measuring up to 2.6 cm posteriorly on the right. 5.Enlarged right paratracheal lymph node measuring up to 1.4 cm, likely reactive. Hilar adenopathy is also suspected, but not well characterized due to noncontrast technique. 6.Additional findings as given above. Electronically Signed: Deshawn Soto MD  1/24/2025 9:43 AM EST  Workstation ID: [id number]    XR Chest 1 View    Result Date: 1/23/2025  Narrative: XR CHEST 1 VW Date of Exam: 1/23/2025 4:47 PM EST Indication: SOA Triage Protocol Comparison: Chest AP dated 12/5/2024 Findings: There is an abandoned segment of a left subclavian central venous catheter measuring 17.3 cm in length. A right subclavian port infusion catheter terminates with the tip in the proximal superior vena cava. Patchy airspace opacities are again noted in the lung fields bilaterally, most predominantly in the mid to upper right lung field. Patient is rotated to the right. Heart size is favored to remain within normal limits. No definite effusion is seen.     Impression: Impression: Patchy chronic bilateral lung infiltrates. No acute  infiltrate. Electronically Signed: Amado Samleron MD  1/23/2025 5:11 PM EST  Workstation ID: [id number]    XR Chest 1 View    Result Date: 1/17/2025  Narrative: PORTABLE CHEST    1/17/2025 6:20 PM HISTORY: SHORTNESS OF BREATH COUGH COMPARISON: Multiple prior studies. FINDINGS: The heart is proper size. The mediastinum is unremarkable. Persistent diffuse airspace opacities in the upper and lower lung zones. Cystic bronchiectasis is redemonstrated. Small right pleural effusion. There are unchanged support lines.    Impression: Persistent multifocal pneumonia. Images reviewed, interpreted, and dictated by Dr. Dhruv Duncan. Transcribed by Rosie Monsalve.    CT Chest Without Contrast Diagnostic    Result Date: 1/17/2025  Narrative: CT CHEST WITHOUT CONTRAST HISTORY: Shortness of air, cough COMPARISON: February 11, 2024 FINDINGS: Axial CT images of the chest were obtained without contrast. Sagittal and coronal reformatted images were also obtained and reviewed. This study was performed with techniques to keep radiation doses as low as reasonably achievable, (ALARA). Individualized dose reduction techniques using automated exposure control or adjustment of mA and/or kV according to the patient size were employed. Bilateral deep lines are present. Small mediastinal nodes are seen without evidence of adenopathy. A new small pericardial effusion is noted measuring up to 13 mm in thickness. No axillary mass or adenopathy is present. There is mild bilateral gynecomastia. Widespread cystic bronchiectasis is again seen involving both lungs. There has been significant interval improvement in the multifocal nodular opacities seen on the prior exam felt to represent multifocal pneumonia. Mild atelectasis is noted at the lung bases. A small right pleural effusion is seen. No chest wall abnormality is identified. Limited images of the upper abdomen reveal postoperative changes from cholecystectomy.    Impression: Stable  widespread cystic bronchiectasis in both lungs. Improved multifocal pneumonia since the prior study. New small pericardial effusion. Images reviewed, interpreted, and dictated by Francois Hatch MD      Medications Reviewed:  arformoterol, 15 mcg, Nebulization, BID - RT  aspirin, 81 mg, Oral, QAM  atorvastatin, 20 mg, Oral, Daily  senna-docusate sodium, 2 tablet, Oral, BID   And  polyethylene glycol, 17 g, Oral, Daily   And  bisacodyl, 5 mg, Oral, Daily  budesonide, 0.5 mg, Nebulization, BID  bumetanide, 2 mg, Oral, BID  busPIRone, 15 mg, Oral, BID  carvedilol, 25 mg, Oral, Q12H  collagenase, 1 Application, Topical, Daily  [Held by provider] enoxaparin, 40 mg, Subcutaneous, Nightly  famotidine, 20 mg, Oral, QAM  ferrous sulfate, 325 mg, Oral, Daily With Breakfast  finasteride, 5 mg, Oral, Daily  guaiFENesin, 600 mg, Oral, Q12H  insulin lispro, 2-9 Units, Subcutaneous, 4x Daily AC & at Bedtime  losartan, 50 mg, Oral, Q24H  magnesium hydroxide, 10 mL, Oral, Daily  montelukast, 10 mg, Oral, Nightly  piperacillin-tazobactam, 4.5 g, Intravenous, Q8H  revefenacin, 175 mcg, Nebulization, Daily - RT  sodium chloride, 10 mL, Intravenous, Q12H  sodium chloride, 10 mL, Intravenous, Q12H  tamsulosin, 0.4 mg, Oral, Daily  tobramycin PF, 300 mg, Nebulization, BID - RT         Assessment/Plan:      PNA (pneumonia)    COPD exacerbation    Bronchiectasis without complication    Pneumonia due to Pseudomonas species    Bronchiectasis with acute exacerbation    Bacterial pneumonia    Perianal abscess    1. Incision and drainage of posterior perianal abscess 2. Sharp excisional debridement through skin and subcutaneous tissue in the posterior perianal area, 9 x 6 x 1 cm    Wound of gluteal cleft    Medical decision making:  MDRO Pseudomonas pneumonia:  # Acute on chronic hypoxic respiratory failure status post bronchoscopy:  Weaned to 2 L of oxygen  -Pulmonology consult appreciated.  -Supportive care  -Continue Zosyn and tobramycin  nebs  -Supportive care  -Serial labs  -Status post bronchoscopy January 28 and February 3     # Perianal abscess status post colostomy:  -Status post I&D January 26  -Status post diverting loop colostomy February 6  -Appreciate surgery recommendations  -Wound care  Surgery input appreciated.    Hypervolemic hyponatremia:  Continue Bumex 2 mg twice daily nephrology input appreciated    Acute on chronic CKD stage II and III:  Continue to monitor creatinine  Nephrology input appreciated     # Diabetes mellitus type 2  -Insulin sliding scale, monitor requirements and adjust as needed     # Paroxysmal atrial fibrillation:  -Eliquis currently on hold due to anemia  -Continue to monitor and add back as appropriate    DVT Prophylaxis:    Lovenox    CODE STATUS:  Full code  Reason for continued hospitalization  and medical necessity :  Pseudomonas pneumonia and perianal abscess requiring further management    Orders:  CBC  CMP  IV Zosyn  Wound care  Nephrology consult  Savene sodium  Urine osmolality    Time spent:  34+ minute not only  including face-to-face rounding putting in the orders writing the note and all the conversation reviewing the records    This transcription was electronically signed.         Disposition:  {plan; Home with self-care    Diet: ADA 1800      Discussed with: Patient and family      This has been electronically signed by:  _______________________________    Khushbu Abbasi [email or web address]     At [facility], we believe that sharing information builds trust and better relationships. You are receiving this note because you recently visited [facility]. It is possible you will see health information before a provider has talked with you about it. This kind of information can be easy to misunderstand. To help you fully understand what it means for your health, we urge you to discuss this note with your provider.           Part of this note may be an electronic transcription/translation of  spoken language to printed ,text using the Dragon Dictation System.

## 2025-02-12 NOTE — PLAN OF CARE
Goal Outcome Evaluation:  Plan of Care Reviewed With: patient        Progress: no change     Pt alert and oriented x 4. O2 AT 2L per n/c. SOA with exertion. Lungs with rhonchi and wheezes. Pt turned Q 2 hrs. Dressing changes done yesterday by wound care RN. Left heel boot in place. Dressings CDI. Colostomy with good output. No visible blood noted in stool. Pain controlled. F/C patent with clear yellow urine noted. VSS.

## 2025-02-12 NOTE — PROGRESS NOTES
[facility]     Progress Note    Patient Name: Preston Wallis  : 1965  MRN: [id number]  Primary Care Physician:  Kimmy Riley MD  Date of admission: 2025    Subjective   Patient's white count still elevated though stable  Hemoglobin is also stable  Blood pressures and vitals are stable  Creatinine around 2.5  No major acute events otherwise overnight  Colostomy with good urine output    Scheduled Meds:arformoterol, 15 mcg, Nebulization, BID - RT  aspirin, 81 mg, Oral, QAM  atorvastatin, 20 mg, Oral, Daily  senna-docusate sodium, 2 tablet, Oral, BID   And  polyethylene glycol, 17 g, Oral, Daily   And  bisacodyl, 5 mg, Oral, Daily  budesonide, 0.5 mg, Nebulization, BID  bumetanide, 2 mg, Oral, BID  busPIRone, 15 mg, Oral, BID  carvedilol, 25 mg, Oral, Q12H  collagenase, 1 Application, Topical, Daily  [Held by provider] enoxaparin, 40 mg, Subcutaneous, Nightly  famotidine, 20 mg, Oral, QAM  ferrous sulfate, 325 mg, Oral, Daily With Breakfast  finasteride, 5 mg, Oral, Daily  guaiFENesin, 600 mg, Oral, Q12H  insulin lispro, 2-9 Units, Subcutaneous, 4x Daily AC & at Bedtime  magnesium hydroxide, 10 mL, Oral, Daily  montelukast, 10 mg, Oral, Nightly  NIFEdipine XL, 30 mg, Oral, QAM  piperacillin-tazobactam, 4.5 g, Intravenous, Q8H  revefenacin, 175 mcg, Nebulization, Daily - RT  sodium chloride, 10 mL, Intravenous, Q12H  sodium chloride, 10 mL, Intravenous, Q12H  tamsulosin, 0.4 mg, Oral, Daily  tobramycin PF, 300 mg, Nebulization, BID - RT      Continuous Infusions:   PRN Meds:.  senna-docusate sodium **AND** polyethylene glycol **AND** bisacodyl **AND** bisacodyl    dextrose    dextrose    glucagon (human recombinant)    heparin    ipratropium-albuterol    nitroglycerin    simethicone    sodium chloride    sodium chloride    sodium chloride    sodium chloride    sodium chloride    sodium chloride       Review of Systems  Constitutional:        Weakness tiredness fatigue  Eyes:                        No blurry vision, eye discharge, eye irritation, eye pain  HEENT:                   No acute hair loss, earache and discharge, nasal congestion or discharge, sore throat, postnasal drip  Respiratory:           No shortness of breath coughing sputum production wheezing hemoptysis pleuritic chest pain  Cardiovascular:     No chest pain, orthopnea, PND, dizziness, palpitation, lower extremity edema  Gastrointestinal:   No nausea vomiting diarrhea abdominal pain constipation  Genitourinary:       No urinary incontinence, hesitancy, frequency, urgency, dysuria  Hematologic:         No bruising, bleeding, pallor, lymphadenopathy  Endocrine:            No coldness, hot flashes, polyuria, abnormal hair growth  Musculoskeletal:    No body pains, aches, arthritic pains, muscle pain ,muscle wasting  Psychiatric:          No low or high mood, anxiety, hallucinations, delusions  Skin.                      No rash, ulcers, bruising, itching  Neurological:        No confusion, headache, focal weakness, numbness, dysphasia    Objective   Objective     Vitals:   Temp:  [97.3 °F (36.3 °C)-98 °F (36.7 °C)] 97.7 °F (36.5 °C)  Heart Rate:  [74-79] 77  Resp:  [16-18] 18  BP: (140-153)/(47-64) 153/48  Flow (L/min) (Oxygen Therapy):  [2] 2  Physical Exam    Constitutional: Awake, alert responsive, conversant, no obvious distress              Psychiatric:  Appropriate affect, cooperative   Neurologic:  Awake alert ,oriented x 3, strength symmetric in all extremities, Cranial Nerves grossly intact to confrontation, speech clear   Eyes:   PERRLA, sclerae anicteric, no conjunctival injection   HEENT:  Moist mucous membranes, no nasal or eye discharge, no throat congestion   Neck:   Supple, no thyromegaly, no lymphadenopathy, trachea midline, no elevated JVD   Respiratory:  Clear to auscultation bilaterally, nonlabored respirations    Cardiovascular: RRR, no murmurs, rubs, or gallops, palpable pedal pulses bilaterally, No bilateral ankle  edema   Gastrointestinal: Positive bowel sounds, soft, nontender, nondistended, no organomegaly   Musculoskeletal:  No clubbing or cyanosis to extremities,muscle wasting, joint swelling, muscle weakness             Skin:                      No rashes, bruising, skin ulcers, petechiae or ecchymosis    Result Review    Result Review:  I have personally reviewed the results from the time of this admission to 2/12/2025 08:46 EST and agree with these findings:  []  Laboratory  []  Microbiology  []  Radiology  []  EKG/Telemetry   []  Cardiology/Vascular   []  Pathology  []  Old records  []  Other:    Assessment & Plan   Assessment / Plan       Active Hospital Problems:  Active Hospital Problems    Diagnosis     **PNA (pneumonia)     1. Incision and drainage of posterior perianal abscess 2. Sharp excisional debridement through skin and subcutaneous tissue in the posterior perianal area, 9 x 6 x 1 cm     Perianal abscess     Wound of gluteal cleft     Bacterial pneumonia     Bronchiectasis with acute exacerbation     Pneumonia due to Pseudomonas species     Bronchiectasis without complication     COPD exacerbation      59-year male with past medical history of CKD stage III with baseline creatinine 1.7-2, insulin-dependent diabetes, hypertension, COPD, history of pulmonary embolism on anticoagulation who has had a prolonged hospital course primarily after he presented to 2 shortness of breath and was found to have bronchiectasis with bronch showing multidrug-resistant Pseudomonas requiring IV antibiotics and abdominal pain with anal abscess status post incision and drainage and now status post loop colostomy for incarcerated umbilical hernia in the first week of February.  Patient requiring Zosyn and tobramycin.  Patient's creatinine elevated 2.5 likely in the setting of diuresis.  CT of the abdomen pelvis showed normal kidneys.  Patient has about 3 to 4 g of proteinuria     Plan:   Continue with Bumex 2 mg p.o. twice  daily  Continue with carvedilol 25   Will start the patient on losartan 50 mg for hypertension as well as proteinuria  Discontinue Procardia  Will assess timing of starting SGLT2       Thank you for involving the care of the patient.  Will continue to follow along    Electronically signed by Nancy Damon MD, 02/12/25, 8:46 AM EST.

## 2025-02-12 NOTE — SIGNIFICANT NOTE
Wound Eval / Progress Noted    YAIMA Stockton     Patient Name: Preston Wallis  : 1965  MRN: [id number]  Today's Date: 2025                 Admit Date: 2025    Visit Dx:    ICD-10-CM ICD-9-CM   1. Pneumonia due to Pseudomonas species, unspecified laterality, unspecified part of lung  J15.1 482.1   2. Urinary tract infection associated with indwelling urethral catheter, initial encounter  T83.511A 996.64    N39.0 599.0   3. Perianal abscess  K61.0 566   4. COPD exacerbation  J44.1 491.21   5. Bronchiectasis without complication  J47.9 494.0   6. Allergy, initial encounter  T78.40XA 995.3   7. Acute hypoxic on chronic hypercapnic respiratory failure  J96.01 518.84    J96.12    8. Tobacco abuse, in remission  F17.201 305.1   9. Chronic dyspnea  R06.09 786.09   10. Obstructive sleep apnea  G47.33 327.23   11. Chronic respiratory failure with hypoxia and hypercapnia  J96.11 518.83    J96.12 799.02     786.09   12. Chronic obstructive pulmonary disease, unspecified COPD type  J44.9 496   13. Wound of gluteal cleft, unspecified laterality, subsequent encounter  S31.809D V58.89     877.0   14. Pneumonia of both lower lobes due to Pneumocystis jirovecii  B59 136.3   15. Bacterial pneumonia  J15.9 482.9   16. Bronchiectasis with acute exacerbation  J47.1 494.1   17. Pneumonia of both lungs due to Pseudomonas species, unspecified part of lung  J15.1 482.1   18. Difficulty walking  R26.2 719.7         PNA (pneumonia)    COPD exacerbation    Bronchiectasis without complication    Pneumonia due to Pseudomonas species    Bronchiectasis with acute exacerbation    Bacterial pneumonia    Perianal abscess    1. Incision and drainage of posterior perianal abscess 2. Sharp excisional debridement through skin and subcutaneous tissue in the posterior perianal area, 9 x 6 x 1 cm    Wound of gluteal cleft        Past Medical History:   Diagnosis Date    1. Incision and drainage of posterior perianal abscess 2. Sharp excisional  "debridement through skin and subcutaneous tissue in the posterior perianal area, 9 x 6 x 1 cm 01/26/2025    Age-related cognitive decline     Allergic contact dermatitis     Allergies     Anemia     Bedbound     11/2023 \"MY LEG MUSCLES STOPPED WORKING\"    Bronchiectasis with acute lower respiratory infection     Charcot foot due to diabetes mellitus 09/10/2013    Chronic diastolic (congestive) heart failure     Chronic kidney disease     Chronic respiratory failure with hypoxia     Closed supracondylar fracture of femur 01/12/2022    COPD (chronic obstructive pulmonary disease)     Deep vein thrombosis (DVT) of lower extremity associated with air travel 01/13/2023    Dependence on supplemental oxygen     Eczema     Erectile dysfunction     due to organic reasons    Essential (primary) hypertension     Fracture     closed fracture of other tarsal and metatarsal bones    Fracture of proximal humerus 01/13/2023    GERD without esophagitis     High risk medication use     Hypercholesteremia     Hypomagnesemia     Infected stasis ulcer of left lower extremity 01/13/2023    Insomnia     Low back pain     Major depressive disorder     Morbid (severe) obesity due to excess calories     MRSA pneumonia     Muscle weakness     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 without evidence of necrosis     Obstructive sleep apnea (adult) (pediatric)     On home O2     REPORTS WEARING 2L/NC AAT    Other forms of dyspnea     Other long term (current) drug therapy     Other specified noninfective gastroenteritis and colitis     Other spondylosis, lumbar region     Pain in both knees     Paroxysmal atrial fibrillation     Peripheral neuropathy     attributed to type 2 diabetes    Pneumonia, unspecified organism     Polyneuropathy     Rash and other nonspecific skin eruption     Self-catheterizes urinary bladder     EVERY 4 HOURS    Smoking     \"SOMETIMES\"    Syncope and collapse     Tachycardia  "    Tinnitus 01/13/2023    Type 1 diabetes mellitus with diabetic chronic kidney disease     Type 2 diabetes mellitus     Unspecified fall, initial encounter     Urinary retention         Past Surgical History:   Procedure Laterality Date    BRONCHOSCOPY N/A 1/28/2025    Procedure: BRONCHOSCOPY: BAL: insertion of lighted instrument to view inside the lung;  Surgeon: Walter Nicole DO;  Location: [facility] MAIN OR;  Service: Pulmonary;  Laterality: N/A;    BRONCHOSCOPY N/A 2/3/2025    Procedure: BRONCHOSCOPY WITH BRONCHOALVEOLAR LAVAGE, POSSIBLE BIOPSY, BRUSHING, WASHING, AIRWAY INSPECTION: insertion of lighted instrument to view inside the lung;  Surgeon: Chadd Swan MD;  Location: [facility] MAIN OR;  Service: Pulmonary;  Laterality: N/A;    CARDIAC CATHETERIZATION Left 8/15/2024    Procedure: Carbon dioxide aortogram with left leg angiogram, possible angioplasty or stenting;  Surgeon: Moshe Willson MD;  Location: [facility] CATH INVASIVE LOCATION;  Service: Vascular;  Laterality: Left;    CHOLECYSTECTOMY      COLOSTOMY N/A 2/6/2025    Procedure: COLOSTOMY LAPAROSCOPIC; plain text: creation of colostomy using small incisions;  Surgeon: Emerson Canada MD;  Location: [facility] OR [facility] [place];  Service: General;  Laterality: N/A;    CYSTOSCOPY      FEMUR SURGERY Left     Shravan placed    INCISION AND DRAINAGE ABSCESS N/A 1/26/2025    Procedure: INCISION AND DRAINAGE ABSCESS; plain text: incision and drain pus from buttocks wound;  Surgeon: Emerson Canada MD;  Location: [facility] OR [facility] [place];  Service: General;  Laterality: N/A;    KNEE SURGERY Left     OTHER SURGICAL HISTORY Left     venous port, REMOVED    PORTACATH PLACEMENT Right     TIBIAL PLATEAU OPEN REDUCTION INTERNAL FIXATION Left 12/22/2023    Procedure: TIBIAL PLATEAU OPEN REDUCTION INTERNAL FIXATION;  Surgeon: Hugo Kline MD;  Location: [facility] OR;  Service: Orthopedics;  Laterality: Left;    TONSILLECTOMY AND ADENOIDECTOMY           Physical  "Assessment:   02/12/25 1006   Colostomy   Placement date: If unknown, DO NOT use \"Add Comment\" note/Placement time: If unknown, DO NOT use \"Add Comment\" note: 02/06/25 1322   Hand Hygiene Completed: Yes   Stomal Appliance 1 piece;Clean;Dry;Intact;Drainable   Stoma Appearance irregular;moist;dusky;bridge in place;protruding above skin level   Peristomal Assessment AMINA   Stoma Function flatus;stool   Stool Color brown   Stool Consistency liquid   Treatment Placement checked   Output (mL) 100 mL     Wound Check / Follow-up:  Patient seen today for ostomy pouching system change; however, patient's wife was unable to make it in today due to car issues. Discussed with patient and patient's wife on the phone, and plan is for ostomy pouching system change to take place tomorrow. Patient underwent an incision and drainage of perianal abscess on 1/26/2025. Patient underwent diverting laprascopic colostomy placement on 2/6/25 to assist with healing of his perianal abscess. Reviewed ostomy education with patient to include managing ostomy, and when to empty pouching system. Patient reports he has been monitoring ostomy output and contacting floor staff when he notes it needs to be emptied.  Patient reports his wife will be emptying his pouching system for him when he discharges home.     Loop colostomy noted to lower aspect of abdomen. Pouching system is intact with no signs of lifting or leaking. 100ml of liquid brown stool emptied from pouching system. Stoma is noted to be irregular, moist, dusky, and protruding above skin level with an ostomy bar in place.  Unable to visualize peristomal tissue.  Recommending to continue diligent ostomy care.  Plan for wife to perform ostomy pouching system change tomorrow with assistance from wound care RN.    Impression: Loop Colostomy.     Short term goals: Regain skin integrity, skin protection, colostomy management.    Amparo Novoa RN    2/12/2025    16:33 EST     "

## 2025-02-12 NOTE — PLAN OF CARE
Goal Outcome Evaluation:              Outcome Evaluation: VSS. UOP. Wound care today to coccyx and left heel of foot. Blood glucose treated per mar. No complaints of pain this shift. Continue current plan of care.

## 2025-02-12 NOTE — PROGRESS NOTES
Pulmonary / Critical Care Progress Note      Patient Name: Preston Wallis  : 1965  MRN: [id number]  Primary Care Physician:  Kimmy Riley MD  Date of admission: 2025    Subjective   Subjective   Follow-up for acute on chronic respiratory failure hypoxic    No changes  On 2 L of oxygen  Dyspnea at baseline  Tolerating [place]  Tolerating diet  Remains weak and fatigued      Objective   Objective     Vitals:   Temp:  [97.3 °F (36.3 °C)-98 °F (36.7 °C)] 97.7 °F (36.5 °C)  Heart Rate:  [74-79] 77  Resp:  [16-18] 18  BP: (140-153)/(47-64) 153/48  Flow (L/min) (Oxygen Therapy):  [2] 2    Physical Exam   Vital Signs Reviewed   General: Chronically ill-appearing, obese male, Alert, NAD, resting in bed  Chest: Diminished bilaterally with scattered rhonchi, no work of breathing noted on baseline 2 L NC  CV: RRR, no MGR, pulses 2+, equal.  Abd:  Soft, appropriately tender, ND, colostomy bag in place  EXT:  no clubbing, no cyanosis, no edema  Neuro:  A&Ox3, CN grossly intact, no focal deficits.  Skin: No rashes or lesions noted      Result Review    Result Review:  I have personally reviewed the results from the time of this admission to 2025 09:04 EST and agree with these findings:  [x]  Laboratory  [x]  Microbiology  [x]  Radiology  []  EKG/Telemetry   []  Cardiology/Vascular   []  Pathology  []  Old records  []  Other:  Most notable findings include:        Lab 25  0410 25  0430 02/10/25  1014 02/10/25  0411 25  0346 25  0548 25  0547 25  2047 25  1139 25  0500 25  2336 25  0530   WBC 14.04* 13.36*  --  13.84* 14.76* 13.33*  --   --  15.00* 18.11*   < > 12.92*   HEMOGLOBIN 9.4* 9.0*  --  9.1* 8.5* 9.2*  --  9.0* 7.1* 6.0*   < > 8.2*   HEMATOCRIT 29.2* 28.2*  --  28.6* 26.9* 29.3*  --  27.9* 22.6* 19.3*   < > 25.8*   PLATELETS 325 279  --  229 192 173  --   --  176 190   < > 212   SODIUM 136 139 138 138 135*  --  135*  --   --  135*  --  134*    POTASSIUM 4.0 4.1 4.1 4.3 3.9  --  4.0  --   --  4.6  --  4.4   CHLORIDE 96* 98 96* 98 96*  --  97*  --   --  95*  --  93*   CO2 29.7* 29.5* 30.8* 30.6* 29.2*  --  28.4  --   --  31.8*  --  34.6*   BUN 59* 60* 58* 63* 58*  --  53*  --   --  55*  --  47*   CREATININE 2.49* 2.54* 2.49* 2.70* 2.58*  --  2.58*  --   --  2.29*  --  2.28*   GLUCOSE 173* 137* 250* 181* 162*  --  174*  --   --  229*  --  168*   CALCIUM 8.7 8.7 8.5* 8.4* 8.3*  --  8.2*  --   --  8.5*  --  8.8   PHOSPHORUS 2.8 2.5  --  2.6 2.7  --  3.4  --   --  4.2  --  2.7   TOTAL PROTEIN  --   --  6.3  --   --   --   --   --   --   --   --   --    ALBUMIN  --   --  2.8*  --   --   --   --   --   --   --   --   --    GLOBULIN  --   --  3.5  --   --   --   --   --   --   --   --   --     < > = values in this interval not displayed.     CT Chest Without Contrast Diagnostic    Result Date: 1/24/2025  CT CHEST WO CONTRAST DIAGNOSTIC Date of Exam: 1/24/2025 7:48 AM EST Indication: Shortness of breath.. Comparison: 10/22/2024 Technique: Axial CT images were obtained of the chest without contrast administration.  Reconstructed coronal and sagittal images were also obtained. Automated exposure control and iterative construction methods were used. Findings: There is a new somewhat spiculated nodular focus in the superior left lower lobe image #66 of series 2. The rapid interval development suggests an infectious or inflammatory process. Short-term follow-up CT suggested. Stable appearance of verrucous and cystic bronchiectasis involving all lobes of the lungs, but greatest in the right upper lobe and lower lobes. There are again adjacent micronodular densities peribronchial distribution suggesting suggesting an infectious or inflammatory process. Nontuberculous mycobacterial infection or other atypical agent would be a top considerations. There is layering mucoid material in the trachea and right main bronchus. There is also probable mucoid impaction within areas  of bronchiectasis noted. Small bilateral pleural effusions are noted, slightly increased from prior. There is consolidation in the lung bases bilaterally which may be due to atelectasis or additional infiltrates. Right subclavian Yjorms-x-Gtit catheter again noted. Retained tubing from left subclavian Tpovla-p-Xidr catheter again noted. There is an increasing pericardial effusion, measuring up to 2.6 cm posteriorly on the right. Enlarged right paratracheal lymph node is noted measuring up to 1.4 cm, likely reactive. Hilar adenopathy is also suspected, but not well characterized due to noncontrast technique. Thyroid is enlarged and heterogeneous which may be due to goitrous change. Limited imaging through the upper abdomen demonstrates postcholecystectomy change. The adrenals have a grossly normal morphology. There is degenerative change in the spine.     Impression: Impression: 1.There is a new somewhat spiculated nodular focus in the superior left lower lobe image #66 of series. The rapid interval development suggests an infectious or inflammatory process. 3-month follow-up CT recommended per Fleischner guidelines. 2.Stable appearance of verrucous and cystic bronchiectasis involving all lobes of the lungs, but greatest in the right upper lobe and lower lobes. There are again adjacent micronodular densities in a peribronchial distribution suggesting an infectious or  inflammatory process. Nontuberculous mycobacterial infection or other atypical agent would be a top considerations. 3.Small bilateral pleural effusions, slightly increased from prior. There is consolidation in the lung bases bilaterally which may be due to atelectasis or additional infiltrates. 4.Increasing pericardial effusion, now measuring up to 2.6 cm posteriorly on the right. 5.Enlarged right paratracheal lymph node measuring up to 1.4 cm, likely reactive. Hilar adenopathy is also suspected, but not well characterized due to noncontrast technique.  6.Additional findings as given above. Electronically Signed: Deshawn Soto MD  1/24/2025 9:43 AM EST  Workstation ID: [id number]     Most recent chest x-ray with by lateral upper lobe airspace disease consistent bronchiectasis and trace bilateral effusions  Sputum culture with multidrug-resistant Pseudomonas    Assessment & Plan   Assessment / Plan     Active Hospital Problems:  Active Hospital Problems    Diagnosis     **PNA (pneumonia)     1. Incision and drainage of posterior perianal abscess 2. Sharp excisional debridement through skin and subcutaneous tissue in the posterior perianal area, 9 x 6 x 1 cm     Perianal abscess     Wound of gluteal cleft     Bacterial pneumonia     Bronchiectasis with acute exacerbation     Pneumonia due to Pseudomonas species     Bronchiectasis without complication     COPD exacerbation      Impression:  Acute on chronic hypoxemic respiratory failure  Multidrug-resistant Pseudomonas pneumonia  Bronchiectasis with acute exacerbation  Acute exacerbation of COPD  Paroxysmal atrial fibrillation  Perianal abscess status post drainage  Status post diverting colostomy  Hypomagnesemia  Class II obesity with BMI 35.1    Plan:  On home oxygen of 2 L/min  Continue NIPPV nightly with naps on current settings  Status post bronchoscopy with clearance of airways.  Positive for MDRO Pseudomonas infection  Complete 7 days of Zosyn per sensitivities for multidrug-resistant Pseudomonas pneumonia  Continue MOIZ nebs and cycle off/on in 30-day intervals for 6 months  Continue Brovana, Pulmicort and Yupelri  Continue o oral Bumex  Trend renal panels and electrolytes.   Encourage activity and incentive spirometer use  Appreciate general surgery assistance.  Continue wound care  Eliquis on hold for anemia.  Transfuse for hemoglobin less than 7  Patient would greatly benefit from rehab, however he refuses    VTE Prophylaxis:  Pharmacologic & mechanical VTE prophylaxis orders are present.    CODE STATUS:    Code Status (Patient has no pulse and is not breathing): CPR (Attempt to Resuscitate)  Medical Interventions (Patient has pulse or is breathing): Full Support      Labs, imaging, microbiology, notes and medications personally reviewed  Discussed with primary    Electronically signed by Severiano Carolina MD, 02/12/25, 4:09 PM EST.

## 2025-02-13 LAB
CALCIUM SPEC-SCNC: 8.6 MG/DL (ref 8.6–10.5)
GLUCOSE BLDC GLUCOMTR-MCNC: 107 MG/DL (ref 70–99)
GLUCOSE BLDC GLUCOMTR-MCNC: 179 MG/DL (ref 70–99)
GLUCOSE BLDC GLUCOMTR-MCNC: 184 MG/DL (ref 70–99)
GLUCOSE BLDC GLUCOMTR-MCNC: 198 MG/DL (ref 70–99)
VIRUS SPEC CULT: NORMAL
WHOLE BLOOD HOLD SPECIMEN: NORMAL

## 2025-02-13 PROCEDURE — 94761 N-INVAS EAR/PLS OXIMETRY MLT: CPT

## 2025-02-13 PROCEDURE — 94799 UNLISTED PULMONARY SVC/PX: CPT

## 2025-02-13 PROCEDURE — 99233 SBSQ HOSP IP/OBS HIGH 50: CPT | Performed by: INTERNAL MEDICINE

## 2025-02-13 PROCEDURE — 25010000002 PIPERACILLIN SOD-TAZOBACTAM PER 1 G: Performed by: STUDENT IN AN ORGANIZED HEALTH CARE EDUCATION/TRAINING PROGRAM

## 2025-02-13 PROCEDURE — 63710000001 REVEFENACIN 175 MCG/3ML SOLUTION: Performed by: STUDENT IN AN ORGANIZED HEALTH CARE EDUCATION/TRAINING PROGRAM

## 2025-02-13 PROCEDURE — 82948 REAGENT STRIP/BLOOD GLUCOSE: CPT

## 2025-02-13 PROCEDURE — 99232 SBSQ HOSP IP/OBS MODERATE 35: CPT | Performed by: INTERNAL MEDICINE

## 2025-02-13 PROCEDURE — 82948 REAGENT STRIP/BLOOD GLUCOSE: CPT | Performed by: STUDENT IN AN ORGANIZED HEALTH CARE EDUCATION/TRAINING PROGRAM

## 2025-02-13 PROCEDURE — 82310 ASSAY OF CALCIUM: CPT | Performed by: STUDENT IN AN ORGANIZED HEALTH CARE EDUCATION/TRAINING PROGRAM

## 2025-02-13 PROCEDURE — 94664 DEMO&/EVAL PT USE INHALER: CPT

## 2025-02-13 PROCEDURE — 63710000001 INSULIN LISPRO (HUMAN) PER 5 UNITS: Performed by: STUDENT IN AN ORGANIZED HEALTH CARE EDUCATION/TRAINING PROGRAM

## 2025-02-13 RX ORDER — POLYETHYLENE GLYCOL 3350 17 G/17G
17 POWDER, FOR SOLUTION ORAL DAILY PRN
Status: DISCONTINUED | OUTPATIENT
Start: 2025-02-13 | End: 2025-02-20 | Stop reason: HOSPADM

## 2025-02-13 RX ADMIN — BUSPIRONE HYDROCHLORIDE 15 MG: 5 TABLET ORAL at 09:09

## 2025-02-13 RX ADMIN — ASPIRIN 81 MG: 81 TABLET, COATED ORAL at 06:18

## 2025-02-13 RX ADMIN — BUDESONIDE 0.5 MG: 0.5 INHALANT RESPIRATORY (INHALATION) at 18:47

## 2025-02-13 RX ADMIN — CARVEDILOL 25 MG: 25 TABLET, FILM COATED ORAL at 09:08

## 2025-02-13 RX ADMIN — GUAIFENESIN 600 MG: 600 TABLET ORAL at 09:21

## 2025-02-13 RX ADMIN — FAMOTIDINE 20 MG: 20 TABLET ORAL at 06:18

## 2025-02-13 RX ADMIN — Medication 10 ML: at 09:18

## 2025-02-13 RX ADMIN — TOBRAMYCIN 300 MG: 300 SOLUTION RESPIRATORY (INHALATION) at 10:49

## 2025-02-13 RX ADMIN — ATORVASTATIN CALCIUM 20 MG: 10 TABLET, FILM COATED ORAL at 09:09

## 2025-02-13 RX ADMIN — INSULIN LISPRO 2 UNITS: 100 INJECTION, SOLUTION INTRAVENOUS; SUBCUTANEOUS at 11:43

## 2025-02-13 RX ADMIN — PIPERACILLIN AND TAZOBACTAM 4.5 G: 4; .5 INJECTION, POWDER, FOR SOLUTION INTRAVENOUS; PARENTERAL at 06:18

## 2025-02-13 RX ADMIN — TOBRAMYCIN 300 MG: 300 SOLUTION RESPIRATORY (INHALATION) at 18:47

## 2025-02-13 RX ADMIN — TAMSULOSIN HYDROCHLORIDE 0.4 MG: 0.4 CAPSULE ORAL at 09:09

## 2025-02-13 RX ADMIN — MONTELUKAST 10 MG: 10 TABLET, FILM COATED ORAL at 21:23

## 2025-02-13 RX ADMIN — FINASTERIDE 5 MG: 5 TABLET, FILM COATED ORAL at 09:09

## 2025-02-13 RX ADMIN — FERROUS SULFATE TAB 325 MG (65 MG ELEMENTAL FE) 325 MG: 325 (65 FE) TAB at 07:32

## 2025-02-13 RX ADMIN — GUAIFENESIN 600 MG: 600 TABLET ORAL at 21:23

## 2025-02-13 RX ADMIN — INSULIN LISPRO 2 UNITS: 100 INJECTION, SOLUTION INTRAVENOUS; SUBCUTANEOUS at 21:40

## 2025-02-13 RX ADMIN — BUDESONIDE 0.5 MG: 0.5 INHALANT RESPIRATORY (INHALATION) at 10:49

## 2025-02-13 RX ADMIN — COLLAGENASE SANTYL 1 APPLICATION: 250 OINTMENT TOPICAL at 11:44

## 2025-02-13 RX ADMIN — INSULIN LISPRO 2 UNITS: 100 INJECTION, SOLUTION INTRAVENOUS; SUBCUTANEOUS at 17:24

## 2025-02-13 RX ADMIN — ARFORMOTEROL TARTRATE 15 MCG: 15 SOLUTION RESPIRATORY (INHALATION) at 10:49

## 2025-02-13 RX ADMIN — CARVEDILOL 25 MG: 25 TABLET, FILM COATED ORAL at 21:23

## 2025-02-13 RX ADMIN — Medication 10 ML: at 21:23

## 2025-02-13 RX ADMIN — ARFORMOTEROL TARTRATE 15 MCG: 15 SOLUTION RESPIRATORY (INHALATION) at 18:47

## 2025-02-13 RX ADMIN — BUMETANIDE 2 MG: 1 TABLET ORAL at 21:23

## 2025-02-13 RX ADMIN — PIPERACILLIN AND TAZOBACTAM 4.5 G: 4; .5 INJECTION, POWDER, FOR SOLUTION INTRAVENOUS; PARENTERAL at 00:48

## 2025-02-13 RX ADMIN — REVEFENACIN 175 MCG: 175 SOLUTION RESPIRATORY (INHALATION) at 10:49

## 2025-02-13 RX ADMIN — BUSPIRONE HYDROCHLORIDE 15 MG: 5 TABLET ORAL at 21:23

## 2025-02-13 NOTE — SIGNIFICANT NOTE
Wound Eval / Progress Noted    YAIMA Stockton     Patient Name: Preston Wallis  : 1965  MRN: [id number]  Today's Date: 2025                 Admit Date: 2025    Visit Dx:    ICD-10-CM ICD-9-CM   1. Pneumonia due to Pseudomonas species, unspecified laterality, unspecified part of lung  J15.1 482.1   2. Urinary tract infection associated with indwelling urethral catheter, initial encounter  T83.511A 996.64    N39.0 599.0   3. Perianal abscess  K61.0 566   4. COPD exacerbation  J44.1 491.21   5. Bronchiectasis without complication  J47.9 494.0   6. Allergy, initial encounter  T78.40XA 995.3   7. Acute hypoxic on chronic hypercapnic respiratory failure  J96.01 518.84    J96.12    8. Tobacco abuse, in remission  F17.201 305.1   9. Chronic dyspnea  R06.09 786.09   10. Obstructive sleep apnea  G47.33 327.23   11. Chronic respiratory failure with hypoxia and hypercapnia  J96.11 518.83    J96.12 799.02     786.09   12. Chronic obstructive pulmonary disease, unspecified COPD type  J44.9 496   13. Wound of gluteal cleft, unspecified laterality, subsequent encounter  S31.809D V58.89     877.0   14. Pneumonia of both lower lobes due to Pneumocystis jirovecii  B59 136.3   15. Bacterial pneumonia  J15.9 482.9   16. Bronchiectasis with acute exacerbation  J47.1 494.1   17. Pneumonia of both lungs due to Pseudomonas species, unspecified part of lung  J15.1 482.1   18. Difficulty walking  R26.2 719.7         PNA (pneumonia)    COPD exacerbation    Bronchiectasis without complication    Pneumonia due to Pseudomonas species    Bronchiectasis with acute exacerbation    Bacterial pneumonia    Perianal abscess    1. Incision and drainage of posterior perianal abscess 2. Sharp excisional debridement through skin and subcutaneous tissue in the posterior perianal area, 9 x 6 x 1 cm    Wound of gluteal cleft        Past Medical History:   Diagnosis Date    1. Incision and drainage of posterior perianal abscess 2. Sharp excisional  "debridement through skin and subcutaneous tissue in the posterior perianal area, 9 x 6 x 1 cm 01/26/2025    Age-related cognitive decline     Allergic contact dermatitis     Allergies     Anemia     Bedbound     11/2023 \"MY LEG MUSCLES STOPPED WORKING\"    Bronchiectasis with acute lower respiratory infection     Charcot foot due to diabetes mellitus 09/10/2013    Chronic diastolic (congestive) heart failure     Chronic kidney disease     Chronic respiratory failure with hypoxia     Closed supracondylar fracture of femur 01/12/2022    COPD (chronic obstructive pulmonary disease)     Deep vein thrombosis (DVT) of lower extremity associated with air travel 01/13/2023    Dependence on supplemental oxygen     Eczema     Erectile dysfunction     due to organic reasons    Essential (primary) hypertension     Fracture     closed fracture of other tarsal and metatarsal bones    Fracture of proximal humerus 01/13/2023    GERD without esophagitis     High risk medication use     Hypercholesteremia     Hypomagnesemia     Infected stasis ulcer of left lower extremity 01/13/2023    Insomnia     Low back pain     Major depressive disorder     Morbid (severe) obesity due to excess calories     MRSA pneumonia     Muscle weakness     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 without evidence of necrosis     Obstructive sleep apnea (adult) (pediatric)     On home O2     REPORTS WEARING 2L/NC AAT    Other forms of dyspnea     Other long term (current) drug therapy     Other specified noninfective gastroenteritis and colitis     Other spondylosis, lumbar region     Pain in both knees     Paroxysmal atrial fibrillation     Peripheral neuropathy     attributed to type 2 diabetes    Pneumonia, unspecified organism     Polyneuropathy     Rash and other nonspecific skin eruption     Self-catheterizes urinary bladder     EVERY 4 HOURS    Smoking     \"SOMETIMES\"    Syncope and collapse     Tachycardia  "    Tinnitus 01/13/2023    Type 1 diabetes mellitus with diabetic chronic kidney disease     Type 2 diabetes mellitus     Unspecified fall, initial encounter     Urinary retention         Past Surgical History:   Procedure Laterality Date    BRONCHOSCOPY N/A 1/28/2025    Procedure: BRONCHOSCOPY: BAL: insertion of lighted instrument to view inside the lung;  Surgeon: Walter Nicole DO;  Location: [facility] MAIN OR;  Service: Pulmonary;  Laterality: N/A;    BRONCHOSCOPY N/A 2/3/2025    Procedure: BRONCHOSCOPY WITH BRONCHOALVEOLAR LAVAGE, POSSIBLE BIOPSY, BRUSHING, WASHING, AIRWAY INSPECTION: insertion of lighted instrument to view inside the lung;  Surgeon: Chadd Swan MD;  Location: [facility] MAIN OR;  Service: Pulmonary;  Laterality: N/A;    CARDIAC CATHETERIZATION Left 8/15/2024    Procedure: Carbon dioxide aortogram with left leg angiogram, possible angioplasty or stenting;  Surgeon: Moshe Willson MD;  Location: [facility] CATH INVASIVE LOCATION;  Service: Vascular;  Laterality: Left;    CHOLECYSTECTOMY      COLOSTOMY N/A 2/6/2025    Procedure: COLOSTOMY LAPAROSCOPIC; plain text: creation of colostomy using small incisions;  Surgeon: Emerson Canada MD;  Location: [facility] OR [facility] [place];  Service: General;  Laterality: N/A;    CYSTOSCOPY      FEMUR SURGERY Left     Shravan placed    INCISION AND DRAINAGE ABSCESS N/A 1/26/2025    Procedure: INCISION AND DRAINAGE ABSCESS; plain text: incision and drain pus from buttocks wound;  Surgeon: Emerson Canada MD;  Location: [facility] OR [facility] [place];  Service: General;  Laterality: N/A;    KNEE SURGERY Left     OTHER SURGICAL HISTORY Left     venous port, REMOVED    PORTACATH PLACEMENT Right     TIBIAL PLATEAU OPEN REDUCTION INTERNAL FIXATION Left 12/22/2023    Procedure: TIBIAL PLATEAU OPEN REDUCTION INTERNAL FIXATION;  Surgeon: Hugo Kline MD;  Location: [facility] OR;  Service: Orthopedics;  Laterality: Left;    TONSILLECTOMY AND ADENOIDECTOMY           Physical  "Assessment:   02/13/25 1105   Colostomy   Placement date: If unknown, DO NOT use \"Add Comment\" note/Placement time: If unknown, DO NOT use \"Add Comment\" note: 02/06/25 1322   Hand Hygiene Completed: Yes   Wound Image     Stomal Appliance 1 piece;Clean;Dry;Intact;Drainable   Stoma Appearance round;dusky;moist;red;protruding above skin level;bridge in place   Peristomal Assessment Pink;Other (Comment)  (ecchymotic)   Accessories/Skin Care cleansed with water;skin sealant;other (see comments)  (hydrocolloid to peristomal tissue)   Stoma Function flatus;stool   Stool Color brown   Stool Consistency loose   Treatment Site care;Bag change;Placement checked   Output (mL) 150 mL   Stoma measures : 50mm x 50mm     Wound Check / Follow-up:  Patient seen today for ostomy education and pouching system change. Patient underwent an incision and drainage of perianal abscess on 1/26/2025. Patient underwent diverting laprascopic colostomy placement on 2/6/25 to assist with healing of his perianal abscess.  Patient's wife is present at bedside and is patient's designated support person. Reviewed education with patient and patient's wife to include managing ostomy, diet management with ostomy, when to perform a pouch change, and how to perform a pouch change; questions encouraged and answered.      Loop colostomy noted to lower aspect of abdomen. Pouching system is intact with no signs of lifting or leaking. 150ml of loose brown stool emptied from pouching system prior to removal. Patient's wife removed existing pouch with adhesive remover spray. Stoma is noted to be round, moist, and protruding above skin level. Dusky coloration of stoma appears to be lightening, with intermittent areas of red coloration. An ostomy bar is in place and sutures are intermittently visible around stoma. Peristomal tissue presents with pink coloration and ecchymosis, with two resolving areas of epidermal tissue loss around 3 to 4 o'clock with dry pink and " red tissue. Peristomal tissue is noted to be slightly firm and tender. Patient's wife cleansed stoma and peristomal tissue with warm water and washcloth. Peristomal tissue was blotted dry. Patient's wife applied skin prep around stoma to assist with seal. Patient's wife cut to fit and applied a hydrocolloid dressing around stoma to assist with peristomal protection. Patient's wife then cut to fit a new one piece flat Coloplast pouch, and applied it to the prepped site with minimal assistance from wound care RN. Seal obtained. No signs of lifting or leaking noted. Recommending to continue diligent ostomy care. Education provided throughout ostomy pouching system change. Discussed findings with General Surgeon.     Impression: Loop Colostomy.     Short term goals: Regain skin integrity, skin protection, colostomy management.    Amparo Novoa RN    2/13/2025    11:32 EST

## 2025-02-13 NOTE — PROGRESS NOTES
[facility] Progress Note      Patient Name:  Preston Wallis   MRN:  [id number]   :  1965   Date of Admission:  2025   Date of Service:  2025   PMD:  Kimmy Riley MD     Hospital Course:     59 y.o. male past medical history of COPD, hypertension, CHF, history of MDRO presents to the ED due to shortness of breath. Patient was discharged on  for MDRO pseudomonas pneumonia on IV ertapenem.      Patient admitted for shortness of breath.  Chest x-ray shows chronic bilateral lung infiltrates.  CT was performed to arrival of the chest which demonstrates new spiculated left lower lobe nodule, bronchiectasis throughout both lungs, micronodule or peribronchial densities suggestive of atypical infection, right paratracheal lymph node and enlarging pericardial effusion.  Pulmonology and cardiology services consulted.  TTE demonstrates very small pericardial effusion only, estimated EF 49%, with some grade 1 diastolic dysfunction left ventricle.  Wound care noted necrotic tissue perianal region.  CT abdomen pelvis was performed, 4.5 cm x 1.8 cm x 3.5 cm posterior anal abscess noted.  General surgery consulted, patient underwent incision and drainage .  S/p bronchoscopy 2025, BAL grew MDRO Pseudomonas, he completed 7 days of IV meropenem and discontinued on MOIZ nebs.  Wound culture positive for Enterococcus VRE and MDRO Citrobacter, currently receiving IV linezolid and IV Zosyn.  General surgery following and planning diverting colostomy for wound healing.  Underwent laparoscopic diverting loop colostomy procedure along with primary repair of chronically incarcerated 3 cm umbilical hernia on 2025.  General surgery on board.     Interval Followup: Patient had clot with sanguinous output from his perianal wound, receiving wound care.  Colostomy site looks clean with minimal bloody output.  Hemoglobin 6 today, transfusing 3 units of PRBC.  Patient laying comfortably  in bed, no active complaints.  Leukocytosis improving.  Family at bedside, updated.       Consultants:    Neurosurgery  Pulmonology    Procedures:  Chest x-ray    Anti-infectives:    IV Zosyn    February 10, 2025    Chief Complaint: Patient with shortness of breath off and on since several days    Subjective:   Shortness of breath feels better today, seen by surgery had diverting colostomy.    February 11 , 2025    Chief Complaint: Patient with shortness of breath off and on since several days    Subjective: Feels better today less short of breath on 2 L nasal cannula has a lot of weakness and fatigue.  UA positive for yeast  Has worsening renal failure creatinine is 2.54.    February 12 , 2025:    Chief Complaint: Patient with shortness of breath off and on since several days    Subjective:   Colostomy has a good output, creatinine is stable.  O2 saturation is 100%.    February 13 , 2025:    Chief Complaint: Patient with shortness of breath off and on since several days    Subjective:  Advised by nephrology continue Bumex 2 mg twice daily.  Getting local wound care and excisional debridement        Review Of Systems:  GENERAL: Complains of improving weakness and fatigue no time is denies any weight loss appetite is normal.  HEENT:  Denies any rhinitis, no sore throat, no diplopia , hearing is normal  Respiratory: Complains of shortness of breath , no sweating d complaint yspnea on exertion , cough or sputum.  CVS:  Denies any chest pain , palpitation or syncope.  Gi:  No nausea, no vomiting, no diarrhea, no hematemesis , no melena , no rectal bleeding  :  No dysuria frequency , hematuria, no retention:  Musculoskeletal:  Denies any arthritis, or calf pain  Incision and drainage of posterior perianal abscess 2.          Sharp excisional debridement through skin and subcutaneous tissue in the posterior perianal area, 9 x 6 x 1 cm    Wound of gluteal cleft  Hematological:  No bleeding , no petechiae.  Skin:  Denies  rash , pruritus , jaundice  Endocrine:  No polyuria , polydipsia , polyphagia.  CNS:  Denies any confusion , headache , or seizure disorder  Psychiatry:  No anxiety , or depression, no suicide ideation      Objective:  Vitals:    02/13/25 1044 02/13/25 1048 02/13/25 1052 02/13/25 1141   BP:    129/54   BP Location:       Patient Position:       Pulse: 71 73 70 70   Resp:       Temp:    97.7 °F (36.5 °C)   TempSrc:       SpO2:       Weight:       Height:          Physical Exam:  GENERAL APPEARANCE: Alert and oriented.  Appears comfortable.  No acute distress  HEENT: Atraumatic, normocephalic,  PERRLA, mucous membranes moist  NECK: supple; no JVD or thyromegaly noted  CARDIOVASCULAR: Regular rate and rhythm, no murmurs appreciated; no edema present in BLE   RESPIRATORY: Improving Harsh vesicular breathing with scattered rhonchi and crepitation.  GASTROINTESTINAL:  Abdomen  soft; bowel sounds present, non-tender, non-distended, no organomegaly, no CVA tenderness   EXTREMITIES: Pulses equal bilaterally   MUSCULOSKELETAL:  Good muscle strength noted no obvious deformity appreciate.          PSYCH: Appropriate mood and affect  Neurologic:  Alert & oriented x 3.  Normal Mental status.  Normal Cranial Nervies.  EOMI.  FABIO.  Normal 5/5 muscular strength in both upper and lower extremities.  Normal sensation.  Normal and symmetric reflexes. Normal cerbellar function.  Normal Gait. Negative Babinski.    Labs Reviewed  CBC:    Lab Results   Component Value Date    WBC 14.04 (H) 02/12/2025    HGB 9.4 (L) 02/12/2025    HCT 29.2 (L) 02/12/2025    MCV 90.4 02/12/2025     CMP:    Lab Results   Component Value Date     02/12/2025    CO2 29.7 (H) 02/12/2025    GLUCOSE 173 (H) 02/12/2025    BUN 59 (H) 02/12/2025    CREATININE 2.49 (H) 02/12/2025    ALBUMIN 2.8 (L) 02/10/2025    CALCIUM 8.6 02/13/2025    AST 17 02/10/2025    ALT 21 02/10/2025     Lipis Panel:   Lab Results   Component Value Date    CHOL 116 01/25/2025    HDL 54  "01/25/2025      INR:    Lab Results   Component Value Date    INR 1.21 (H) 02/16/2024     Cardiac:    Lab Results   Component Value Date    CKMB 14.07 (H) 02/22/2024     No results found for: \"BNP\"  Lactate:    Lab Results   Component Value Date    LACTATE 1.1 02/10/2025    LACTATE 0.9 01/23/2025    LACTATE 0.69 04/29/2024     Blood Culture:  @lastlabx(cult:2)@    Imaging:  XR Chest 1 View    Result Date: 2/5/2025  Narrative: XR CHEST 1 VW Date of Exam: 2/5/2025 7:13 AM EST Indication: Pneumonia Comparison: Chest radiograph dated 1/23/2025, CT chest dated 1/24/2025 Findings: The patient is rotated. There is a right-sided chest port with tip terminating at the upper SVC. There is a left-sided subclavian line with tip terminating also at the upper SVC. There is cardiomegaly. There is bilateral upper lobe airspace opacity corresponding to areas of known bronchiectasis seen on the prior examination. There is right basilar airspace disease. There are probable trace bilateral pleural effusions. No visible pneumothorax.     Impression: Impression: 1.Bilateral upper lobe airspace opacities corresponding to areas of bronchiectasis seen on the prior examination. 2.Right basilar airspace disease which may represent atelectasis or pneumonia. 3.Trace bilateral pleural effusions. 4.Cardiomegaly. Electronically Signed: Mynor Singleton  2/5/2025 7:47 AM EST  Workstation ID: [id number]    Stress Test With Myocardial Perfusion One Day    Result Date: 1/27/2025  Narrative:   Left ventricular ejection fraction is mildly reduced (Calculated EF = 40%).   Low probability of ischemia during this study, patient may had an apical infarct versus apical thinning..   Findings consistent with an equivocal ECG stress test.     CT Abdomen Pelvis Without Contrast    Result Date: 1/25/2025  Narrative: CT ABDOMEN PELVIS WO CONTRAST Date of Exam: 1/25/2025 10:15 AM EST Indication: perirectal abscess. Comparison: None available. Technique: Axial CT " images were obtained of the abdomen and pelvis without the administration of contrast. Reconstructed coronal and sagittal images were also obtained. Automated exposure control and iterative construction methods were used. Findings: LUNG BASES: Minimal basal atelectasis with trace effusions. Heart is enlarged. There is a 10 mm thickness pericardial effusion. LIVER:  Unremarkable parenchyma without focal lesion. BILIARY/GALLBLADDER: Cholecystectomy SPLEEN:  Unremarkable PANCREAS:  Unremarkable ADRENAL:  Unremarkable KIDNEYS: Mild bilateral renal cortical atrophy with no solid mass identified. No obstruction.  No calculus identified. GASTROINTESTINAL/MESENTERY:  No evidence of obstruction nor inflammation.  No evidence of acute appendicitis. There is minimal right paracolic free fluid. AORTA/IVC:  Normal caliber. RETROPERITONEUM/LYMPH NODES:  Unremarkable REPRODUCTIVE:  Unremarkable BLADDER: Doyle catheter is present. OSSEUS STRUCTURES: No acute osseous process is identified. There is a posterior perianal abscess measuring 4.5 cm maximum AP dimension by 1.8 cm maximum transverse dimension by 3.5 cm in maximum CC dimension. This extends to the deep intergluteal crease skin surface. No intrapelvic extension noted.     Impression: Impression: 1.There is a posterior perianal abscess as detailed above. No intrapelvic extension. 2.Cardiomegaly with relatively small pericardial effusion. There is bibasilar atelectasis with trace pleural effusions. 3.Other incidental nonemergent findings as detailed above. Electronically Signed: Rob Milner MD  1/25/2025 10:55 AM EST  Workstation ID: [id number]    Adult Transthoracic Echo Complete W/ Cont if Necessary Per Protocol    Result Date: 1/24/2025  Narrative:   Left ventricular systolic function is low normal. Calculated left ventricular EF = 49.4%   Left ventricular wall thickness is consistent with moderate concentric hypertrophy.   Left ventricular diastolic function is consistent  with (grade I) impaired relaxation.   There is a small (<1cm) pericardial effusion adjacent to the right ventricle.     CT Chest Without Contrast Diagnostic    Result Date: 1/24/2025  Narrative: CT CHEST WO CONTRAST DIAGNOSTIC Date of Exam: 1/24/2025 7:48 AM EST Indication: Shortness of breath.. Comparison: 10/22/2024 Technique: Axial CT images were obtained of the chest without contrast administration.  Reconstructed coronal and sagittal images were also obtained. Automated exposure control and iterative construction methods were used. Findings: There is a new somewhat spiculated nodular focus in the superior left lower lobe image #66 of series 2. The rapid interval development suggests an infectious or inflammatory process. Short-term follow-up CT suggested. Stable appearance of verrucous and cystic bronchiectasis involving all lobes of the lungs, but greatest in the right upper lobe and lower lobes. There are again adjacent micronodular densities peribronchial distribution suggesting suggesting an infectious or inflammatory process. Nontuberculous mycobacterial infection or other atypical agent would be a top considerations. There is layering mucoid material in the trachea and right main bronchus. There is also probable mucoid impaction within areas of bronchiectasis noted. Small bilateral pleural effusions are noted, slightly increased from prior. There is consolidation in the lung bases bilaterally which may be due to atelectasis or additional infiltrates. Right subclavian Ixlijk-r-Lsot catheter again noted. Retained tubing from left subclavian Grkgjp-u-Afzj catheter again noted. There is an increasing pericardial effusion, measuring up to 2.6 cm posteriorly on the right. Enlarged right paratracheal lymph node is noted measuring up to 1.4 cm, likely reactive. Hilar adenopathy is also suspected, but not well characterized due to noncontrast technique. Thyroid is enlarged and heterogeneous which may be due to  goitrous change. Limited imaging through the upper abdomen demonstrates postcholecystectomy change. The adrenals have a grossly normal morphology. There is degenerative change in the spine.     Impression: Impression: 1.There is a new somewhat spiculated nodular focus in the superior left lower lobe image #66 of series. The rapid interval development suggests an infectious or inflammatory process. 3-month follow-up CT recommended per Fleischner guidelines. 2.Stable appearance of verrucous and cystic bronchiectasis involving all lobes of the lungs, but greatest in the right upper lobe and lower lobes. There are again adjacent micronodular densities in a peribronchial distribution suggesting an infectious or  inflammatory process. Nontuberculous mycobacterial infection or other atypical agent would be a top considerations. 3.Small bilateral pleural effusions, slightly increased from prior. There is consolidation in the lung bases bilaterally which may be due to atelectasis or additional infiltrates. 4.Increasing pericardial effusion, now measuring up to 2.6 cm posteriorly on the right. 5.Enlarged right paratracheal lymph node measuring up to 1.4 cm, likely reactive. Hilar adenopathy is also suspected, but not well characterized due to noncontrast technique. 6.Additional findings as given above. Electronically Signed: Deshawn Soto MD  1/24/2025 9:43 AM EST  Workstation ID: [id number]    XR Chest 1 View    Result Date: 1/23/2025  Narrative: XR CHEST 1 VW Date of Exam: 1/23/2025 4:47 PM EST Indication: SOA Triage Protocol Comparison: Chest AP dated 12/5/2024 Findings: There is an abandoned segment of a left subclavian central venous catheter measuring 17.3 cm in length. A right subclavian port infusion catheter terminates with the tip in the proximal superior vena cava. Patchy airspace opacities are again noted in the lung fields bilaterally, most predominantly in the mid to upper right lung field. Patient is rotated to  the right. Heart size is favored to remain within normal limits. No definite effusion is seen.     Impression: Impression: Patchy chronic bilateral lung infiltrates. No acute infiltrate. Electronically Signed: Amado Salmeron MD  1/23/2025 5:11 PM EST  Workstation ID: [id number]    XR Chest 1 View    Result Date: 1/17/2025  Narrative: PORTABLE CHEST    1/17/2025 6:20 PM HISTORY: SHORTNESS OF BREATH COUGH COMPARISON: Multiple prior studies. FINDINGS: The heart is proper size. The mediastinum is unremarkable. Persistent diffuse airspace opacities in the upper and lower lung zones. Cystic bronchiectasis is redemonstrated. Small right pleural effusion. There are unchanged support lines.    Impression: Persistent multifocal pneumonia. Images reviewed, interpreted, and dictated by Dr. Dhruv Duncan. Transcribed by Rosie Monsalve.    CT Chest Without Contrast Diagnostic    Result Date: 1/17/2025  Narrative: CT CHEST WITHOUT CONTRAST HISTORY: Shortness of air, cough COMPARISON: February 11, 2024 FINDINGS: Axial CT images of the chest were obtained without contrast. Sagittal and coronal reformatted images were also obtained and reviewed. This study was performed with techniques to keep radiation doses as low as reasonably achievable, (ALARA). Individualized dose reduction techniques using automated exposure control or adjustment of mA and/or kV according to the patient size were employed. Bilateral deep lines are present. Small mediastinal nodes are seen without evidence of adenopathy. A new small pericardial effusion is noted measuring up to 13 mm in thickness. No axillary mass or adenopathy is present. There is mild bilateral gynecomastia. Widespread cystic bronchiectasis is again seen involving both lungs. There has been significant interval improvement in the multifocal nodular opacities seen on the prior exam felt to represent multifocal pneumonia. Mild atelectasis is noted at the lung bases. A small right pleural  effusion is seen. No chest wall abnormality is identified. Limited images of the upper abdomen reveal postoperative changes from cholecystectomy.    Impression: Stable widespread cystic bronchiectasis in both lungs. Improved multifocal pneumonia since the prior study. New small pericardial effusion. Images reviewed, interpreted, and dictated by Francois Hatch MD      Medications Reviewed:  arformoterol, 15 mcg, Nebulization, BID - RT  aspirin, 81 mg, Oral, QAM  atorvastatin, 20 mg, Oral, Daily  budesonide, 0.5 mg, Nebulization, BID  bumetanide, 2 mg, Oral, BID  busPIRone, 15 mg, Oral, BID  carvedilol, 25 mg, Oral, Q12H  collagenase, 1 Application, Topical, Daily  [Held by provider] enoxaparin, 40 mg, Subcutaneous, Nightly  famotidine, 20 mg, Oral, QAM  ferrous sulfate, 325 mg, Oral, Daily With Breakfast  finasteride, 5 mg, Oral, Daily  guaiFENesin, 600 mg, Oral, Q12H  insulin lispro, 2-9 Units, Subcutaneous, 4x Daily AC & at Bedtime  losartan, 50 mg, Oral, Q24H  montelukast, 10 mg, Oral, Nightly  revefenacin, 175 mcg, Nebulization, Daily - RT  sodium chloride, 10 mL, Intravenous, Q12H  sodium chloride, 10 mL, Intravenous, Q12H  tamsulosin, 0.4 mg, Oral, Daily  tobramycin PF, 300 mg, Nebulization, BID - RT         Assessment/Plan:      PNA (pneumonia)    COPD exacerbation    Bronchiectasis without complication    Pneumonia due to Pseudomonas species    Bronchiectasis with acute exacerbation    Bacterial pneumonia    Perianal abscess    1. Incision and drainage of posterior perianal abscess 2. Sharp excisional debridement through skin and subcutaneous tissue in the posterior perianal area, 9 x 6 x 1 cm    Wound of gluteal cleft    Medical decision making:  MDRO Pseudomonas pneumonia:  # Acute on chronic hypoxic respiratory failure status post bronchoscopy:  Weaned to 2 L of oxygen  -Pulmonology consult appreciated.  -Supportive care  -Continue Zosyn and tobramycin nebs  -Supportive care  -Serial labs  -Status post  bronchoscopy January 28 and February 3     # Perianal abscess status post colostomy:  -Status post I&D January 26  -Status post diverting loop colostomy February 6  -Appreciate surgery recommendations  -Wound care  Surgery input appreciated.    Hypervolemic hyponatremia:  Continue Bumex 2 mg twice daily nephrology input appreciated    Acute on chronic CKD stage II and III:  Continue to monitor creatinine  Nephrology input appreciated     # Diabetes mellitus type 2  -Insulin sliding scale, monitor requirements and adjust as needed     # Paroxysmal atrial fibrillation:  -Eliquis currently on hold due to anemia  -Continue to monitor and add back as appropriate    DVT Prophylaxis:    Lovenox    CODE STATUS:  Full code  Reason for continued hospitalization  and medical necessity :  Pseudomonas pneumonia and perianal abscess requiring further management    Orders:  CBC  CMP  IV Zosyn  Wound care  Nephrology consult  Savene sodium  Urine osmolality    Time spent:  33+ minute not only  including face-to-face rounding putting in the orders writing the note and all the conversation reviewing the records    This transcription was electronically signed.         Disposition:  {plan; Home with self-care    Diet: ADA 1800      Discussed with: Patient and family      This has been electronically signed by:  _______________________________    Khushbu Abbasi [email or web address]     At [facility], we believe that sharing information builds trust and better relationships. You are receiving this note because you recently visited [facility]. It is possible you will see health information before a provider has talked with you about it. This kind of information can be easy to misunderstand. To help you fully understand what it means for your health, we urge you to discuss this note with your provider.           Part of this note may be an electronic transcription/translation of spoken language to printed ,text using the Dragon  Dictation System.

## 2025-02-13 NOTE — PROGRESS NOTES
Pulmonary / Critical Care Progress Note      Patient Name: Preston Wallis  : 1965  MRN: [id number]  Primary Care Physician:  Kimmy Riley MD  Date of admission: 2025    Subjective   Subjective   Follow-up for acute on chronic respiratory failure hypoxic    No changes  On 2 L of oxygen  Dyspnea at baseline  Tolerating [place]  Tolerating diet  Remains weak and fatigued      Objective   Objective     Vitals:   Temp:  [97.3 °F (36.3 °C)-98.1 °F (36.7 °C)] 98.1 °F (36.7 °C)  Heart Rate:  [67-81] 76  Resp:  [16-18] 16  BP: (123-149)/(42-50) 136/45  Flow (L/min) (Oxygen Therapy):  [2] 2    Physical Exam   Vital Signs Reviewed   General: Chronically ill-appearing, obese male, Alert, NAD, resting in bed  Chest: Diminished bilaterally with scattered rhonchi, no work of breathing noted on baseline 2 L NC  CV: RRR, no MGR, pulses 2+, equal.  Abd:  Soft, appropriately tender, ND, colostomy bag in place  EXT:  no clubbing, no cyanosis, no edema  Neuro:  A&Ox3, CN grossly intact, no focal deficits.  Skin: No rashes or lesions noted      Result Review    Result Review:  I have personally reviewed the results from the time of this admission to 2025 08:19 EST and agree with these findings:  [x]  Laboratory  [x]  Microbiology  [x]  Radiology  []  EKG/Telemetry   []  Cardiology/Vascular   []  Pathology  []  Old records  []  Other:  Most notable findings include:        Lab 25  0356 25  0410 25  0430 02/10/25  1014 02/10/25  0411 25  0346 25  0548 25  0547 25  2047 25  1139 25  0500   WBC  --  14.04* 13.36*  --  13.84* 14.76* 13.33*  --   --  15.00* 18.11*   HEMOGLOBIN  --  9.4* 9.0*  --  9.1* 8.5* 9.2*  --  9.0* 7.1* 6.0*   HEMATOCRIT  --  29.2* 28.2*  --  28.6* 26.9* 29.3*  --  27.9* 22.6* 19.3*   PLATELETS  --  325 279  --  229 192 173  --   --  176 190   SODIUM  --  136 139 138 138 135*  --  135*  --   --  135*   POTASSIUM  --  4.0 4.1 4.1 4.3 3.9  --  4.0   --   --  4.6   CHLORIDE  --  96* 98 96* 98 96*  --  97*  --   --  95*   CO2  --  29.7* 29.5* 30.8* 30.6* 29.2*  --  28.4  --   --  31.8*   BUN  --  59* 60* 58* 63* 58*  --  53*  --   --  55*   CREATININE  --  2.49* 2.54* 2.49* 2.70* 2.58*  --  2.58*  --   --  2.29*   GLUCOSE  --  173* 137* 250* 181* 162*  --  174*  --   --  229*   CALCIUM 8.6 8.7 8.7 8.5* 8.4* 8.3*  --  8.2*  --   --  8.5*   PHOSPHORUS  --  2.8 2.5  --  2.6 2.7  --  3.4  --   --  4.2   TOTAL PROTEIN  --   --   --  6.3  --   --   --   --   --   --   --    ALBUMIN  --   --   --  2.8*  --   --   --   --   --   --   --    GLOBULIN  --   --   --  3.5  --   --   --   --   --   --   --      CT Chest Without Contrast Diagnostic    Result Date: 1/24/2025  CT CHEST WO CONTRAST DIAGNOSTIC Date of Exam: 1/24/2025 7:48 AM EST Indication: Shortness of breath.. Comparison: 10/22/2024 Technique: Axial CT images were obtained of the chest without contrast administration.  Reconstructed coronal and sagittal images were also obtained. Automated exposure control and iterative construction methods were used. Findings: There is a new somewhat spiculated nodular focus in the superior left lower lobe image #66 of series 2. The rapid interval development suggests an infectious or inflammatory process. Short-term follow-up CT suggested. Stable appearance of verrucous and cystic bronchiectasis involving all lobes of the lungs, but greatest in the right upper lobe and lower lobes. There are again adjacent micronodular densities peribronchial distribution suggesting suggesting an infectious or inflammatory process. Nontuberculous mycobacterial infection or other atypical agent would be a top considerations. There is layering mucoid material in the trachea and right main bronchus. There is also probable mucoid impaction within areas of bronchiectasis noted. Small bilateral pleural effusions are noted, slightly increased from prior. There is consolidation in the lung bases  bilaterally which may be due to atelectasis or additional infiltrates. Right subclavian Oxarfa-q-Wqcl catheter again noted. Retained tubing from left subclavian Axzwiv-f-Tepl catheter again noted. There is an increasing pericardial effusion, measuring up to 2.6 cm posteriorly on the right. Enlarged right paratracheal lymph node is noted measuring up to 1.4 cm, likely reactive. Hilar adenopathy is also suspected, but not well characterized due to noncontrast technique. Thyroid is enlarged and heterogeneous which may be due to goitrous change. Limited imaging through the upper abdomen demonstrates postcholecystectomy change. The adrenals have a grossly normal morphology. There is degenerative change in the spine.     Impression: Impression: 1.There is a new somewhat spiculated nodular focus in the superior left lower lobe image #66 of series. The rapid interval development suggests an infectious or inflammatory process. 3-month follow-up CT recommended per Fleischner guidelines. 2.Stable appearance of verrucous and cystic bronchiectasis involving all lobes of the lungs, but greatest in the right upper lobe and lower lobes. There are again adjacent micronodular densities in a peribronchial distribution suggesting an infectious or  inflammatory process. Nontuberculous mycobacterial infection or other atypical agent would be a top considerations. 3.Small bilateral pleural effusions, slightly increased from prior. There is consolidation in the lung bases bilaterally which may be due to atelectasis or additional infiltrates. 4.Increasing pericardial effusion, now measuring up to 2.6 cm posteriorly on the right. 5.Enlarged right paratracheal lymph node measuring up to 1.4 cm, likely reactive. Hilar adenopathy is also suspected, but not well characterized due to noncontrast technique. 6.Additional findings as given above. Electronically Signed: Deshawn Soto MD  1/24/2025 9:43 AM EST  Workstation ID: [id number]     Most recent  chest x-ray with by lateral upper lobe airspace disease consistent bronchiectasis and trace bilateral effusions  Sputum culture with multidrug-resistant Pseudomonas    Assessment & Plan   Assessment / Plan     Active Hospital Problems:  Active Hospital Problems    Diagnosis    • **PNA (pneumonia)    • 1. Incision and drainage of posterior perianal abscess 2. Sharp excisional debridement through skin and subcutaneous tissue in the posterior perianal area, 9 x 6 x 1 cm    • Perianal abscess    • Wound of gluteal cleft    • Bacterial pneumonia    • Bronchiectasis with acute exacerbation    • Pneumonia due to Pseudomonas species    • Bronchiectasis without complication    • COPD exacerbation      Impression:  Acute on chronic hypoxemic respiratory failure  Multidrug-resistant Pseudomonas pneumonia  Bronchiectasis with acute exacerbation  Acute exacerbation of COPD  Paroxysmal atrial fibrillation  Perianal abscess status post drainage  Status post diverting colostomy  Hypomagnesemia  Class II obesity with BMI 35.1    Plan:  No changes from yesterday  On home oxygen of 2 L/min  Continue NIPPV nightly with naps on current settings  Status post bronchoscopy with clearance of airways.  Positive for MDRO Pseudomonas infection  Complete 7 days of Zosyn per sensitivities for multidrug-resistant Pseudomonas pneumonia  Continue MOIZ nebs and cycle off/on in 30-day intervals for 6 months  Continue Brovana, Pulmicort and Yupelri  Continue o oral Bumex  Trend renal panels and electrolytes.   Encourage activity and incentive spirometer use  Appreciate general surgery assistance.  Continue wound care  Eliquis on hold for anemia.  Transfuse for hemoglobin less than 7  Patient would greatly benefit from rehab, however he refuses    VTE Prophylaxis:  Pharmacologic & mechanical VTE prophylaxis orders are present.    CODE STATUS:   Code Status (Patient has no pulse and is not breathing): CPR (Attempt to Resuscitate)  Medical Interventions  (Patient has pulse or is breathing): Full Support      Labs, imaging, microbiology, notes and medications personally reviewed  Discussed with primary    Electronically signed by Severiano Carolina MD, 02/13/25, 4:22 PM EST.

## 2025-02-13 NOTE — PLAN OF CARE
Goal Outcome Evaluation:  Plan of Care Reviewed With: patient        Progress: no change  Outcome Evaluation: Pt A/Ox4, able to make needs known. IV abx administered. No c/o pain. Nausea noted at the beginning of the shift, no emesis, PRN Zofran given. Effective per patient. Adequate output to colostomy. Education reinforced on emptying bag/burping bag, pt confirmed understanding with teach back method. Doyle catheter patent with yellow urine output. [id number] in place. Skin care provided per MD orders. Blood glucose monitored.

## 2025-02-13 NOTE — PROGRESS NOTES
[facility]     Progress Note    Patient Name: Preston Wallis  : 1965  MRN: [id number]  Primary Care Physician:  Kimmy Riley MD  Date of admission: 2025    Subjective   No labs this morning  Blood pressures and vitals are stable  Patient's blood pressures are better controlled this morning    Scheduled Meds:arformoterol, 15 mcg, Nebulization, BID - RT  aspirin, 81 mg, Oral, QAM  atorvastatin, 20 mg, Oral, Daily  senna-docusate sodium, 2 tablet, Oral, BID   And  polyethylene glycol, 17 g, Oral, Daily   And  bisacodyl, 5 mg, Oral, Daily  budesonide, 0.5 mg, Nebulization, BID  bumetanide, 2 mg, Oral, BID  busPIRone, 15 mg, Oral, BID  carvedilol, 25 mg, Oral, Q12H  collagenase, 1 Application, Topical, Daily  [Held by provider] enoxaparin, 40 mg, Subcutaneous, Nightly  famotidine, 20 mg, Oral, QAM  ferrous sulfate, 325 mg, Oral, Daily With Breakfast  finasteride, 5 mg, Oral, Daily  guaiFENesin, 600 mg, Oral, Q12H  insulin lispro, 2-9 Units, Subcutaneous, 4x Daily AC & at Bedtime  losartan, 50 mg, Oral, Q24H  magnesium hydroxide, 10 mL, Oral, Daily  montelukast, 10 mg, Oral, Nightly  piperacillin-tazobactam, 4.5 g, Intravenous, Q8H  revefenacin, 175 mcg, Nebulization, Daily - RT  sodium chloride, 10 mL, Intravenous, Q12H  sodium chloride, 10 mL, Intravenous, Q12H  tamsulosin, 0.4 mg, Oral, Daily  tobramycin PF, 300 mg, Nebulization, BID - RT      Continuous Infusions:   PRN Meds:.•  senna-docusate sodium **AND** polyethylene glycol **AND** bisacodyl **AND** bisacodyl  •  dextrose  •  dextrose  •  glucagon (human recombinant)  •  heparin  •  ipratropium-albuterol  •  nitroglycerin  •  ondansetron  •  simethicone  •  sodium chloride  •  sodium chloride  •  sodium chloride  •  sodium chloride  •  sodium chloride  •  sodium chloride       Review of Systems  Constitutional:        Weakness tiredness fatigue  Eyes:                       No blurry vision, eye discharge, eye irritation, eye pain  HEENT:                    No acute hair loss, earache and discharge, nasal congestion or discharge, sore throat, postnasal drip  Respiratory:           No shortness of breath coughing sputum production wheezing hemoptysis pleuritic chest pain  Cardiovascular:     No chest pain, orthopnea, PND, dizziness, palpitation, lower extremity edema  Gastrointestinal:   No nausea vomiting diarrhea abdominal pain constipation  Genitourinary:       No urinary incontinence, hesitancy, frequency, urgency, dysuria  Hematologic:         No bruising, bleeding, pallor, lymphadenopathy  Endocrine:            No coldness, hot flashes, polyuria, abnormal hair growth  Musculoskeletal:    No body pains, aches, arthritic pains, muscle pain ,muscle wasting  Psychiatric:          No low or high mood, anxiety, hallucinations, delusions  Skin.                      No rash, ulcers, bruising, itching  Neurological:        No confusion, headache, focal weakness, numbness, dysphasia    Objective   Objective     Vitals:   Temp:  [97.3 °F (36.3 °C)-98.1 °F (36.7 °C)] 98.1 °F (36.7 °C)  Heart Rate:  [67-81] 76  Resp:  [16-18] 16  BP: (123-149)/(42-50) 136/45  Flow (L/min) (Oxygen Therapy):  [2] 2  Physical Exam    Constitutional: Awake, alert responsive, conversant, no obvious distress              Psychiatric:  Appropriate affect, cooperative   Neurologic:  Awake alert ,oriented x 3, strength symmetric in all extremities, Cranial Nerves grossly intact to confrontation, speech clear   Eyes:   PERRLA, sclerae anicteric, no conjunctival injection   HEENT:  Moist mucous membranes, no nasal or eye discharge, no throat congestion   Neck:   Supple, no thyromegaly, no lymphadenopathy, trachea midline, no elevated JVD   Respiratory:  Clear to auscultation bilaterally, nonlabored respirations    Cardiovascular: RRR, no murmurs, rubs, or gallops, palpable pedal pulses bilaterally, No bilateral ankle edema   Gastrointestinal: Positive bowel sounds, soft, nontender,  nondistended, no organomegaly   Musculoskeletal:  No clubbing or cyanosis to extremities,muscle wasting, joint swelling, muscle weakness             Skin:                      No rashes, bruising, skin ulcers, petechiae or ecchymosis    Result Review    Result Review:  I have personally reviewed the results from the time of this admission to 2/13/2025 07:49 EST and agree with these findings:  []  Laboratory  []  Microbiology  []  Radiology  []  EKG/Telemetry   []  Cardiology/Vascular   []  Pathology  []  Old records  []  Other:    Assessment & Plan   Assessment / Plan       Active Hospital Problems:  Active Hospital Problems    Diagnosis    • **PNA (pneumonia)    • 1. Incision and drainage of posterior perianal abscess 2. Sharp excisional debridement through skin and subcutaneous tissue in the posterior perianal area, 9 x 6 x 1 cm    • Perianal abscess    • Wound of gluteal cleft    • Bacterial pneumonia    • Bronchiectasis with acute exacerbation    • Pneumonia due to Pseudomonas species    • Bronchiectasis without complication    • COPD exacerbation      59-year male with past medical history of CKD stage III with baseline creatinine 1.7-2, insulin-dependent diabetes, hypertension, COPD, history of pulmonary embolism on anticoagulation who has had a prolonged hospital course primarily after he presented to 2 shortness of breath and was found to have bronchiectasis with bronch showing multidrug-resistant Pseudomonas requiring IV antibiotics and abdominal pain with anal abscess status post incision and drainage and now status post loop colostomy for incarcerated umbilical hernia in the first week of February.  Patient requiring Zosyn and tobramycin.  Patient's creatinine elevated 2.5 likely in the setting of diuresis.  CT of the abdomen pelvis showed normal kidneys.  Patient has about 3 to 4 g of proteinuria likely DM related     Plan:   Continue with Bumex 2 mg p.o. twice daily  Continue with carvedilol 25    Started patient on losartan 50 mg for hypertension as well as proteinuria  Discontinue Procardia  Will assess timing of starting SGLT2       Thank you for involving the care of the patient.  Will continue to follow along

## 2025-02-13 NOTE — PLAN OF CARE
Goal Outcome Evaluation:              Outcome Evaluation: VSS. UOP. right sided chest port deaccessed and reaccesed this shift. wound care provided per MD orders. Education reinforced on bag burping and emptying, pt confirmed understanding with teachback method.

## 2025-02-14 LAB
ALBUMIN SERPL-MCNC: 2.5 G/DL (ref 3.5–5.2)
ANION GAP SERPL CALCULATED.3IONS-SCNC: 8.5 MMOL/L (ref 5–15)
BUN SERPL-MCNC: 58 MG/DL (ref 6–20)
BUN/CREAT SERPL: 22.1 (ref 7–25)
CALCIUM SPEC-SCNC: 8.7 MG/DL (ref 8.6–10.5)
CHLORIDE SERPL-SCNC: 99 MMOL/L (ref 98–107)
CO2 SERPL-SCNC: 29.5 MMOL/L (ref 22–29)
CREAT SERPL-MCNC: 2.62 MG/DL (ref 0.76–1.27)
EGFRCR SERPLBLD CKD-EPI 2021: 27.3 ML/MIN/1.73
GLUCOSE BLDC GLUCOMTR-MCNC: 137 MG/DL (ref 70–99)
GLUCOSE BLDC GLUCOMTR-MCNC: 164 MG/DL (ref 70–99)
GLUCOSE BLDC GLUCOMTR-MCNC: 225 MG/DL (ref 70–99)
GLUCOSE BLDC GLUCOMTR-MCNC: 234 MG/DL (ref 70–99)
GLUCOSE SERPL-MCNC: 135 MG/DL (ref 65–99)
PHOSPHATE SERPL-MCNC: 3.9 MG/DL (ref 2.5–4.5)
POTASSIUM SERPL-SCNC: 4.2 MMOL/L (ref 3.5–5.2)
SODIUM SERPL-SCNC: 137 MMOL/L (ref 136–145)

## 2025-02-14 PROCEDURE — 99232 SBSQ HOSP IP/OBS MODERATE 35: CPT | Performed by: INTERNAL MEDICINE

## 2025-02-14 PROCEDURE — 94664 DEMO&/EVAL PT USE INHALER: CPT

## 2025-02-14 PROCEDURE — 82948 REAGENT STRIP/BLOOD GLUCOSE: CPT | Performed by: STUDENT IN AN ORGANIZED HEALTH CARE EDUCATION/TRAINING PROGRAM

## 2025-02-14 PROCEDURE — 25010000002 ONDANSETRON PER 1 MG: Performed by: PHYSICIAN ASSISTANT

## 2025-02-14 PROCEDURE — 94761 N-INVAS EAR/PLS OXIMETRY MLT: CPT

## 2025-02-14 PROCEDURE — 99233 SBSQ HOSP IP/OBS HIGH 50: CPT | Performed by: INTERNAL MEDICINE

## 2025-02-14 PROCEDURE — 63710000001 REVEFENACIN 175 MCG/3ML SOLUTION: Performed by: STUDENT IN AN ORGANIZED HEALTH CARE EDUCATION/TRAINING PROGRAM

## 2025-02-14 PROCEDURE — 94799 UNLISTED PULMONARY SVC/PX: CPT

## 2025-02-14 PROCEDURE — 63710000001 INSULIN LISPRO (HUMAN) PER 5 UNITS: Performed by: STUDENT IN AN ORGANIZED HEALTH CARE EDUCATION/TRAINING PROGRAM

## 2025-02-14 PROCEDURE — 97605 NEG PRS WND THER DME<=50SQCM: CPT

## 2025-02-14 PROCEDURE — 82948 REAGENT STRIP/BLOOD GLUCOSE: CPT

## 2025-02-14 PROCEDURE — 80069 RENAL FUNCTION PANEL: CPT | Performed by: STUDENT IN AN ORGANIZED HEALTH CARE EDUCATION/TRAINING PROGRAM

## 2025-02-14 RX ADMIN — TOBRAMYCIN 300 MG: 300 SOLUTION RESPIRATORY (INHALATION) at 20:09

## 2025-02-14 RX ADMIN — BUDESONIDE 0.5 MG: 0.5 INHALANT RESPIRATORY (INHALATION) at 20:09

## 2025-02-14 RX ADMIN — COLLAGENASE SANTYL 1 APPLICATION: 250 OINTMENT TOPICAL at 12:02

## 2025-02-14 RX ADMIN — LOSARTAN POTASSIUM 50 MG: 50 TABLET, FILM COATED ORAL at 09:15

## 2025-02-14 RX ADMIN — ONDANSETRON HYDROCHLORIDE 4 MG: 2 SOLUTION INTRAMUSCULAR; INTRAVENOUS at 23:17

## 2025-02-14 RX ADMIN — ATORVASTATIN CALCIUM 20 MG: 10 TABLET, FILM COATED ORAL at 09:15

## 2025-02-14 RX ADMIN — GUAIFENESIN 600 MG: 600 TABLET ORAL at 21:34

## 2025-02-14 RX ADMIN — TAMSULOSIN HYDROCHLORIDE 0.4 MG: 0.4 CAPSULE ORAL at 09:14

## 2025-02-14 RX ADMIN — BUSPIRONE HYDROCHLORIDE 15 MG: 5 TABLET ORAL at 09:15

## 2025-02-14 RX ADMIN — FINASTERIDE 5 MG: 5 TABLET, FILM COATED ORAL at 09:15

## 2025-02-14 RX ADMIN — BUSPIRONE HYDROCHLORIDE 15 MG: 5 TABLET ORAL at 21:35

## 2025-02-14 RX ADMIN — BUMETANIDE 2 MG: 1 TABLET ORAL at 09:15

## 2025-02-14 RX ADMIN — CARVEDILOL 25 MG: 25 TABLET, FILM COATED ORAL at 21:34

## 2025-02-14 RX ADMIN — INSULIN LISPRO 4 UNITS: 100 INJECTION, SOLUTION INTRAVENOUS; SUBCUTANEOUS at 17:16

## 2025-02-14 RX ADMIN — Medication 10 ML: at 21:35

## 2025-02-14 RX ADMIN — REVEFENACIN 175 MCG: 175 SOLUTION RESPIRATORY (INHALATION) at 09:47

## 2025-02-14 RX ADMIN — BUDESONIDE 0.5 MG: 0.5 INHALANT RESPIRATORY (INHALATION) at 09:47

## 2025-02-14 RX ADMIN — INSULIN LISPRO 4 UNITS: 100 INJECTION, SOLUTION INTRAVENOUS; SUBCUTANEOUS at 12:02

## 2025-02-14 RX ADMIN — INSULIN LISPRO 2 UNITS: 100 INJECTION, SOLUTION INTRAVENOUS; SUBCUTANEOUS at 22:50

## 2025-02-14 RX ADMIN — CARVEDILOL 25 MG: 25 TABLET, FILM COATED ORAL at 09:15

## 2025-02-14 RX ADMIN — Medication 10 ML: at 09:16

## 2025-02-14 RX ADMIN — MONTELUKAST 10 MG: 10 TABLET, FILM COATED ORAL at 21:34

## 2025-02-14 RX ADMIN — GUAIFENESIN 600 MG: 600 TABLET ORAL at 09:15

## 2025-02-14 RX ADMIN — ASPIRIN 81 MG: 81 TABLET, COATED ORAL at 05:38

## 2025-02-14 RX ADMIN — BUMETANIDE 2 MG: 1 TABLET ORAL at 21:34

## 2025-02-14 RX ADMIN — ARFORMOTEROL TARTRATE 15 MCG: 15 SOLUTION RESPIRATORY (INHALATION) at 20:09

## 2025-02-14 RX ADMIN — TOBRAMYCIN 300 MG: 300 SOLUTION RESPIRATORY (INHALATION) at 09:47

## 2025-02-14 RX ADMIN — ARFORMOTEROL TARTRATE 15 MCG: 15 SOLUTION RESPIRATORY (INHALATION) at 09:47

## 2025-02-14 RX ADMIN — FAMOTIDINE 20 MG: 20 TABLET ORAL at 05:38

## 2025-02-14 RX ADMIN — FERROUS SULFATE TAB 325 MG (65 MG ELEMENTAL FE) 325 MG: 325 (65 FE) TAB at 09:15

## 2025-02-14 NOTE — PROGRESS NOTES
Pulmonary / Critical Care Progress Note      Patient Name: Preston Wallis  : 1965  MRN: [id number]  Primary Care Physician:  Kimmy Riley MD  Date of admission: 2025    Subjective   Subjective   Follow-up for acute on chronic respiratory failure hypoxic    No acute events overnight    This morning,  Remains on 2 L  Niki bedridden  Remains weak and fatigued  Denies any chest pain or chest tightness  Denies cough  Does not feel like secretions are building up again  No fever or chills    Objective   Objective     Vitals:   Temp:  [97.5 °F (36.4 °C)-99.3 °F (37.4 °C)] 97.8 °F (36.6 °C)  Heart Rate:  [70-76] 76  Resp:  [18-22] 18  BP: (118-151)/(50-73) 151/57  Flow (L/min) (Oxygen Therapy):  [2] 2    Physical Exam   Vital Signs Reviewed   General: Chronically ill-appearing, obese male, Alert, NAD, resting in bed  Chest: Diminished bilaterally with scattered rhonchi, no work of breathing noted on baseline 2 L NC  CV: RRR, no MGR, pulses 2+, equal.  Abd:  Soft, appropriately tender, ND, colostomy bag in place  EXT:  no clubbing, no cyanosis, no edema  Neuro:  A&Ox3, CN grossly intact, no focal deficits.  Skin: No rashes or lesions noted      Result Review    Result Review:  I have personally reviewed the results from the time of this admission to 2025 08:46 EST and agree with these findings:  [x]  Laboratory  [x]  Microbiology  [x]  Radiology  []  EKG/Telemetry   []  Cardiology/Vascular   []  Pathology  []  Old records  []  Other:  Most notable findings include:        Lab 25  0543 25  0356 25  0410 25  0430 02/10/25  1014 02/10/25  0411 25  0346 25  0548 25  0547 25  0547 25  2047 25  1139   WBC  --   --  14.04* 13.36*  --  13.84* 14.76* 13.33*  --   --   --  15.00*   HEMOGLOBIN  --   --  9.4* 9.0*  --  9.1* 8.5* 9.2*  --   --  9.0* 7.1*   HEMATOCRIT  --   --  29.2* 28.2*  --  28.6* 26.9* 29.3*  --   --  27.9* 22.6*   PLATELETS  --   --   325 279  --  229 192 173  --   --   --  176   SODIUM 137  --  136 139 138 138 135*  --   --  135*  --   --    POTASSIUM 4.2  --  4.0 4.1 4.1 4.3 3.9  --   --  4.0  --   --    CHLORIDE 99  --  96* 98 96* 98 96*  --   --  97*  --   --    CO2 29.5*  --  29.7* 29.5* 30.8* 30.6* 29.2*  --   --  28.4  --   --    BUN 58*  --  59* 60* 58* 63* 58*  --   --  53*  --   --    CREATININE 2.62*  --  2.49* 2.54* 2.49* 2.70* 2.58*  --   --  2.58*  --   --    GLUCOSE 135*  --  173* 137* 250* 181* 162*  --   --  174*  --   --    CALCIUM 8.7 8.6 8.7 8.7 8.5* 8.4* 8.3*  --    < > 8.2*  --   --    PHOSPHORUS 3.9  --  2.8 2.5  --  2.6 2.7  --   --  3.4  --   --    TOTAL PROTEIN  --   --   --   --  6.3  --   --   --   --   --   --   --    ALBUMIN 2.5*  --   --   --  2.8*  --   --   --   --   --   --   --    GLOBULIN  --   --   --   --  3.5  --   --   --   --   --   --   --     < > = values in this interval not displayed.          Most recent chest x-ray with by lateral upper lobe airspace disease consistent bronchiectasis and trace bilateral effusions  Sputum culture with multidrug-resistant Pseudomonas    Assessment & Plan   Assessment / Plan     Active Hospital Problems:  Active Hospital Problems    Diagnosis    • **PNA (pneumonia)    • 1. Incision and drainage of posterior perianal abscess 2. Sharp excisional debridement through skin and subcutaneous tissue in the posterior perianal area, 9 x 6 x 1 cm    • Perianal abscess    • Wound of gluteal cleft    • Bacterial pneumonia    • Bronchiectasis with acute exacerbation    • Pneumonia due to Pseudomonas species    • Bronchiectasis without complication    • COPD exacerbation      Impression:  Acute on chronic hypoxemic respiratory failure  Multidrug-resistant Pseudomonas pneumonia  Bronchiectasis with acute exacerbation  Acute exacerbation of COPD  Paroxysmal atrial fibrillation  Perianal abscess status post drainage  Status post diverting colostomy  Hypomagnesemia  Class II obesity with  BMI 35.1    Plan:  No changes from yesterday  On home oxygen of 2 L/min  Continue NIPPV nightly with naps on current settings  Status post bronchoscopy with clearance of airways.  Positive for MDRO Pseudomonas infection  Complete 7 days of Zosyn per sensitivities for multidrug-resistant Pseudomonas pneumonia  Continue MOIZ nebs and cycle off/on in 30-day intervals for 6 months  Continue Brovana, Pulmicort and Yupelri  Continue o oral Bumex  Trend renal panels and electrolytes.   Encourage activity and incentive spirometer use  Appreciate general surgery assistance.  Continue wound care  Eliquis on hold for anemia.  Transfuse for hemoglobin less than 7  Patient would greatly benefit from rehab, however he refuses    VTE Prophylaxis:  Pharmacologic & mechanical VTE prophylaxis orders are present.    CODE STATUS:   Code Status (Patient has no pulse and is not breathing): CPR (Attempt to Resuscitate)  Medical Interventions (Patient has pulse or is breathing): Full Support      Labs, imaging, microbiology, notes and medications personally reviewed  Discussed with primary    I, Dr. Severiano Carolina, have spent more than 50% of the total time managing the patient in this encounter today.  This included personally reviewing all pertinent labs, imaging, microbiology and documentation. Also discussing the case with the patient and any available family, the admitting physician and any available ancillary staff.    Electronically signed by CON Sampson, 02/14/25, 1:08 PM EST.  Electronically signed by Severiano Carolina MD, 02/14/25, 2:40 PM EST.

## 2025-02-14 NOTE — PROGRESS NOTES
[facility]     Progress Note    Patient Name: Preston Wallis  : 1965  MRN: [id number]  Primary Care Physician:  Kimmy Riley MD  Date of admission: 2025    Subjective   Patient's blood pressures and vitals are stable  Volume status appears to be optimized  Blood pressures are better since starting losartan  Creatinine is also stable    Scheduled Meds:arformoterol, 15 mcg, Nebulization, BID - RT  aspirin, 81 mg, Oral, QAM  atorvastatin, 20 mg, Oral, Daily  budesonide, 0.5 mg, Nebulization, BID  bumetanide, 2 mg, Oral, BID  busPIRone, 15 mg, Oral, BID  carvedilol, 25 mg, Oral, Q12H  collagenase, 1 Application, Topical, Daily  [Held by provider] enoxaparin, 40 mg, Subcutaneous, Nightly  famotidine, 20 mg, Oral, QAM  ferrous sulfate, 325 mg, Oral, Daily With Breakfast  finasteride, 5 mg, Oral, Daily  guaiFENesin, 600 mg, Oral, Q12H  insulin lispro, 2-9 Units, Subcutaneous, 4x Daily AC & at Bedtime  losartan, 50 mg, Oral, Q24H  montelukast, 10 mg, Oral, Nightly  revefenacin, 175 mcg, Nebulization, Daily - RT  sodium chloride, 10 mL, Intravenous, Q12H  sodium chloride, 10 mL, Intravenous, Q12H  tamsulosin, 0.4 mg, Oral, Daily  tobramycin PF, 300 mg, Nebulization, BID - RT      Continuous Infusions:   PRN Meds:.•  dextrose  •  dextrose  •  glucagon (human recombinant)  •  heparin  •  ipratropium-albuterol  •  nitroglycerin  •  ondansetron  •  polyethylene glycol  •  simethicone  •  sodium chloride  •  sodium chloride  •  sodium chloride  •  sodium chloride  •  sodium chloride  •  sodium chloride       Review of Systems  Constitutional:        Weakness tiredness fatigue  Eyes:                       No blurry vision, eye discharge, eye irritation, eye pain  HEENT:                   No acute hair loss, earache and discharge, nasal congestion or discharge, sore throat, postnasal drip  Respiratory:           No shortness of breath coughing sputum production wheezing hemoptysis pleuritic chest  pain  Cardiovascular:     No chest pain, orthopnea, PND, dizziness, palpitation, lower extremity edema  Gastrointestinal:   No nausea vomiting diarrhea abdominal pain constipation  Genitourinary:       No urinary incontinence, hesitancy, frequency, urgency, dysuria  Hematologic:         No bruising, bleeding, pallor, lymphadenopathy  Endocrine:            No coldness, hot flashes, polyuria, abnormal hair growth  Musculoskeletal:    No body pains, aches, arthritic pains, muscle pain ,muscle wasting  Psychiatric:          No low or high mood, anxiety, hallucinations, delusions  Skin.                      No rash, ulcers, bruising, itching  Neurological:        No confusion, headache, focal weakness, numbness, dysphasia    Objective   Objective     Vitals:   Temp:  [97.5 °F (36.4 °C)-99.3 °F (37.4 °C)] 97.7 °F (36.5 °C)  Heart Rate:  [70-76] 76  Resp:  [18-22] 18  BP: (118-149)/(50-73) 121/50  Flow (L/min) (Oxygen Therapy):  [2] 2  Physical Exam    Constitutional: Awake, alert responsive, conversant, no obvious distress              Psychiatric:  Appropriate affect, cooperative   Neurologic:  Awake alert ,oriented x 3, strength symmetric in all extremities, Cranial Nerves grossly intact to confrontation, speech clear   Eyes:   PERRLA, sclerae anicteric, no conjunctival injection   HEENT:  Moist mucous membranes, no nasal or eye discharge, no throat congestion   Neck:   Supple, no thyromegaly, no lymphadenopathy, trachea midline, no elevated JVD   Respiratory:  Clear to auscultation bilaterally, nonlabored respirations    Cardiovascular: RRR, no murmurs, rubs, or gallops, palpable pedal pulses bilaterally, No bilateral ankle edema   Gastrointestinal: Positive bowel sounds, soft, nontender, nondistended, no organomegaly   Musculoskeletal:  No clubbing or cyanosis to extremities,muscle wasting, joint swelling, muscle weakness             Skin:                      No rashes, bruising, skin ulcers, petechiae or  ecchymosis    Result Review    Result Review:  I have personally reviewed the results from the time of this admission to 2/14/2025 08:25 EST and agree with these findings:  []  Laboratory  []  Microbiology  []  Radiology  []  EKG/Telemetry   []  Cardiology/Vascular   []  Pathology  []  Old records  []  Other:    Assessment & Plan   Assessment / Plan       Active Hospital Problems:  Active Hospital Problems    Diagnosis    • **PNA (pneumonia)    • 1. Incision and drainage of posterior perianal abscess 2. Sharp excisional debridement through skin and subcutaneous tissue in the posterior perianal area, 9 x 6 x 1 cm    • Perianal abscess    • Wound of gluteal cleft    • Bacterial pneumonia    • Bronchiectasis with acute exacerbation    • Pneumonia due to Pseudomonas species    • Bronchiectasis without complication    • COPD exacerbation      59-year male with past medical history of CKD stage III with baseline creatinine 1.7-2, insulin-dependent diabetes, hypertension, COPD, history of pulmonary embolism on anticoagulation who has had a prolonged hospital course primarily after he presented to 2 shortness of breath and was found to have bronchiectasis with bronch showing multidrug-resistant Pseudomonas requiring IV antibiotics and abdominal pain with anal abscess status post incision and drainage and now status post loop colostomy for incarcerated umbilical hernia in the first week of February.  Patient requiring Zosyn and tobramycin.  Patient's creatinine elevated 2.5 likely in the setting of diuresis.  CT of the abdomen pelvis showed normal kidneys.  Patient has about 3 to 4 g of proteinuria likely DM related     Plan:   Continue with Bumex 2 mg p.o. twice daily  Continue with carvedilol 25   Continue patient on losartan 50 mg for hypertension as well as proteinuria  Will assess timing of starting SGLT2 most likely in outpatient setting       Thank you for involving the care of the patient.  Will continue to follow  along

## 2025-02-14 NOTE — PROGRESS NOTES
[facility] Progress Note      Patient Name:  Preston Wallis   MRN:  [id number]   :  1965   Date of Admission:  2025   Date of Service:  2025   PMD:  Kimmy Riley MD     Hospital Course:     59 y.o. male past medical history of COPD, hypertension, CHF, history of MDRO presents to the ED due to shortness of breath. Patient was discharged on  for MDRO pseudomonas pneumonia on IV ertapenem.      Patient admitted for shortness of breath.  Chest x-ray shows chronic bilateral lung infiltrates.  CT was performed to arrival of the chest which demonstrates new spiculated left lower lobe nodule, bronchiectasis throughout both lungs, micronodule or peribronchial densities suggestive of atypical infection, right paratracheal lymph node and enlarging pericardial effusion.  Pulmonology and cardiology services consulted.  TTE demonstrates very small pericardial effusion only, estimated EF 49%, with some grade 1 diastolic dysfunction left ventricle.  Wound care noted necrotic tissue perianal region.  CT abdomen pelvis was performed, 4.5 cm x 1.8 cm x 3.5 cm posterior anal abscess noted.  General surgery consulted, patient underwent incision and drainage .  S/p bronchoscopy 2025, BAL grew MDRO Pseudomonas, he completed 7 days of IV meropenem and discontinued on MOIZ nebs.  Wound culture positive for Enterococcus VRE and MDRO Citrobacter, currently receiving IV linezolid and IV Zosyn.  General surgery following and planning diverting colostomy for wound healing.  Underwent laparoscopic diverting loop colostomy procedure along with primary repair of chronically incarcerated 3 cm umbilical hernia on 2025.  General surgery on board.     Interval Followup: Patient had clot with sanguinous output from his perianal wound, receiving wound care.  Colostomy site looks clean with minimal bloody output.  Hemoglobin 6 today, transfusing 3 units of PRBC.  Patient laying comfortably  in bed, no active complaints.  Leukocytosis improving.  Family at bedside, updated.       Consultants:    Neurosurgery  Pulmonology    Procedures:  Chest x-ray    Anti-infectives:    IV Zosyn    February 10, 2025    Chief Complaint: Patient with shortness of breath off and on since several days    Subjective:   Shortness of breath feels better today, seen by surgery had diverting colostomy.    February 11 , 2025    Chief Complaint: Patient with shortness of breath off and on since several days    Subjective: Feels better today less short of breath on 2 L nasal cannula has a lot of weakness and fatigue.  UA positive for yeast  Has worsening renal failure creatinine is 2.54.    February 12 , 2025:    Chief Complaint: Patient with shortness of breath off and on since several days    Subjective:   Colostomy has a good output, creatinine is stable.  O2 saturation is 100%.    February 13 , 2025:    Chief Complaint: Patient with shortness of breath off and on since several days    Subjective:  Advised by nephrology continue Bumex 2 mg twice daily.  Getting local wound care and excisional debridement    February 14 , 2025:    Chief Complaint: Patient with shortness of breath off and on since several days    Subjective:/events since last 24 hours:  Continue local wound care, creatinine is stable seen by nephrology,    Review Of Systems:  GENERAL: Complains of improving weakness and fatigue, denies any weight loss appetite is normal.  HEENT:  Denies any rhinitis, no sore throat, no diplopia , hearing is normal  Respiratory: Complains of shortness of breath , no sweating d complaint yspnea on exertion , cough or sputum.  CVS:  Denies any chest pain , palpitation or syncope.  Gi:  No nausea, no vomiting, no diarrhea, no hematemesis , no melena , no rectal bleeding  :  No dysuria frequency , hematuria, no retention:  Musculoskeletal:  Denies any arthritis, or calf pain  Incision and drainage of posterior perianal abscess 2.           Sharp excisional debridement through skin and subcutaneous tissue in the posterior perianal area, 9 x 6 x 1 cm    Wound of gluteal cleft.  Hematological:  No bleeding , no petechiae.  Skin:  Denies rash , pruritus , jaundice  Endocrine:  No polyuria , polydipsia , polyphagia.  CNS:  Denies any confusion , headache , or seizure disorder  Psychiatry:  No anxiety , or depression, no suicide ideation      Objective:  Vitals:    02/14/25 0951 02/14/25 0955 02/14/25 0959 02/14/25 1100   BP:    130/51   BP Location:    Left arm   Patient Position:    Lying   Pulse: 83 86 82    Resp:    18   Temp:    97.4 °F (36.3 °C)   TempSrc:    Oral   SpO2: 94% 94% 94% 100%   Weight:       Height:          Physical Exam:  GENERAL APPEARANCE: Alert and oriented.  Appears comfortable.  No acute distress  HEENT: Atraumatic, normocephalic,  PERRLA, mucous membranes moist  NECK: supple; no JVD or thyromegaly noted  CARDIOVASCULAR: Regular rate and rhythm, no murmurs appreciated; no edema present in BLE   RESPIRATORY: Improving Harsh vesicular breathing with scattered rhonchi and crepitation.  GASTROINTESTINAL:  Abdomen  soft; bowel sounds present, non-tender, non-distended, no organomegaly, no CVA tenderness   EXTREMITIES: Pulses equal bilaterally   MUSCULOSKELETAL:  Good muscle strength noted no obvious deformity appreciate.          PSYCH: Appropriate mood and affect  Neurologic:  Alert & oriented x 3.  Normal Mental status.  Normal Cranial Nervies.  EOMI.  FABIO.  Normal 5/5 muscular strength in both upper and lower extremities.  Normal sensation.  Normal and symmetric reflexes. Normal cerbellar function.  Normal Gait. Negative Babinski.    Labs Reviewed  CBC:    Lab Results   Component Value Date    WBC 14.04 (H) 02/12/2025    HGB 9.4 (L) 02/12/2025    HCT 29.2 (L) 02/12/2025    MCV 90.4 02/12/2025     CMP:    Lab Results   Component Value Date     02/14/2025    CO2 29.5 (H) 02/14/2025    GLUCOSE 135 (H) 02/14/2025    BUN 58  "(H) 02/14/2025    CREATININE 2.62 (H) 02/14/2025    ALBUMIN 2.5 (L) 02/14/2025    CALCIUM 8.7 02/14/2025    AST 17 02/10/2025    ALT 21 02/10/2025     Lipis Panel:   Lab Results   Component Value Date    CHOL 116 01/25/2025    HDL 54 01/25/2025      INR:    Lab Results   Component Value Date    INR 1.21 (H) 02/16/2024     Cardiac:    Lab Results   Component Value Date    CKMB 14.07 (H) 02/22/2024     No results found for: \"BNP\"  Lactate:    Lab Results   Component Value Date    LACTATE 1.1 02/10/2025    LACTATE 0.9 01/23/2025    LACTATE 0.69 04/29/2024     Blood Culture:  @lastlabx(cult:2)@    Imaging:  XR Chest 1 View    Result Date: 2/5/2025  Narrative: XR CHEST 1 VW Date of Exam: 2/5/2025 7:13 AM EST Indication: Pneumonia Comparison: Chest radiograph dated 1/23/2025, CT chest dated 1/24/2025 Findings: The patient is rotated. There is a right-sided chest port with tip terminating at the upper SVC. There is a left-sided subclavian line with tip terminating also at the upper SVC. There is cardiomegaly. There is bilateral upper lobe airspace opacity corresponding to areas of known bronchiectasis seen on the prior examination. There is right basilar airspace disease. There are probable trace bilateral pleural effusions. No visible pneumothorax.     Impression: Impression: 1.Bilateral upper lobe airspace opacities corresponding to areas of bronchiectasis seen on the prior examination. 2.Right basilar airspace disease which may represent atelectasis or pneumonia. 3.Trace bilateral pleural effusions. 4.Cardiomegaly. Electronically Signed: Mynor Santiagoelor  2/5/2025 7:47 AM EST  Workstation ID: [id number]    Stress Test With Myocardial Perfusion One Day    Result Date: 1/27/2025  Narrative:   Left ventricular ejection fraction is mildly reduced (Calculated EF = 40%).   Low probability of ischemia during this study, patient may had an apical infarct versus apical thinning..   Findings consistent with an equivocal ECG stress " test.     CT Abdomen Pelvis Without Contrast    Result Date: 1/25/2025  Narrative: CT ABDOMEN PELVIS WO CONTRAST Date of Exam: 1/25/2025 10:15 AM EST Indication: perirectal abscess. Comparison: None available. Technique: Axial CT images were obtained of the abdomen and pelvis without the administration of contrast. Reconstructed coronal and sagittal images were also obtained. Automated exposure control and iterative construction methods were used. Findings: LUNG BASES: Minimal basal atelectasis with trace effusions. Heart is enlarged. There is a 10 mm thickness pericardial effusion. LIVER:  Unremarkable parenchyma without focal lesion. BILIARY/GALLBLADDER: Cholecystectomy SPLEEN:  Unremarkable PANCREAS:  Unremarkable ADRENAL:  Unremarkable KIDNEYS: Mild bilateral renal cortical atrophy with no solid mass identified. No obstruction.  No calculus identified. GASTROINTESTINAL/MESENTERY:  No evidence of obstruction nor inflammation.  No evidence of acute appendicitis. There is minimal right paracolic free fluid. AORTA/IVC:  Normal caliber. RETROPERITONEUM/LYMPH NODES:  Unremarkable REPRODUCTIVE:  Unremarkable BLADDER: Doyle catheter is present. OSSEUS STRUCTURES: No acute osseous process is identified. There is a posterior perianal abscess measuring 4.5 cm maximum AP dimension by 1.8 cm maximum transverse dimension by 3.5 cm in maximum CC dimension. This extends to the deep intergluteal crease skin surface. No intrapelvic extension noted.     Impression: Impression: 1.There is a posterior perianal abscess as detailed above. No intrapelvic extension. 2.Cardiomegaly with relatively small pericardial effusion. There is bibasilar atelectasis with trace pleural effusions. 3.Other incidental nonemergent findings as detailed above. Electronically Signed: Rob Milner MD  1/25/2025 10:55 AM EST  Workstation ID: [id number]    Adult Transthoracic Echo Complete W/ Cont if Necessary Per Protocol    Result Date: 1/24/2025  Narrative:    Left ventricular systolic function is low normal. Calculated left ventricular EF = 49.4%   Left ventricular wall thickness is consistent with moderate concentric hypertrophy.   Left ventricular diastolic function is consistent with (grade I) impaired relaxation.   There is a small (<1cm) pericardial effusion adjacent to the right ventricle.     CT Chest Without Contrast Diagnostic    Result Date: 1/24/2025  Narrative: CT CHEST WO CONTRAST DIAGNOSTIC Date of Exam: 1/24/2025 7:48 AM EST Indication: Shortness of breath.. Comparison: 10/22/2024 Technique: Axial CT images were obtained of the chest without contrast administration.  Reconstructed coronal and sagittal images were also obtained. Automated exposure control and iterative construction methods were used. Findings: There is a new somewhat spiculated nodular focus in the superior left lower lobe image #66 of series 2. The rapid interval development suggests an infectious or inflammatory process. Short-term follow-up CT suggested. Stable appearance of verrucous and cystic bronchiectasis involving all lobes of the lungs, but greatest in the right upper lobe and lower lobes. There are again adjacent micronodular densities peribronchial distribution suggesting suggesting an infectious or inflammatory process. Nontuberculous mycobacterial infection or other atypical agent would be a top considerations. There is layering mucoid material in the trachea and right main bronchus. There is also probable mucoid impaction within areas of bronchiectasis noted. Small bilateral pleural effusions are noted, slightly increased from prior. There is consolidation in the lung bases bilaterally which may be due to atelectasis or additional infiltrates. Right subclavian Zoqoqu-t-Bhre catheter again noted. Retained tubing from left subclavian Obiuhd-o-Swem catheter again noted. There is an increasing pericardial effusion, measuring up to 2.6 cm posteriorly on the right. Enlarged right  paratracheal lymph node is noted measuring up to 1.4 cm, likely reactive. Hilar adenopathy is also suspected, but not well characterized due to noncontrast technique. Thyroid is enlarged and heterogeneous which may be due to goitrous change. Limited imaging through the upper abdomen demonstrates postcholecystectomy change. The adrenals have a grossly normal morphology. There is degenerative change in the spine.     Impression: Impression: 1.There is a new somewhat spiculated nodular focus in the superior left lower lobe image #66 of series. The rapid interval development suggests an infectious or inflammatory process. 3-month follow-up CT recommended per Fleischner guidelines. 2.Stable appearance of verrucous and cystic bronchiectasis involving all lobes of the lungs, but greatest in the right upper lobe and lower lobes. There are again adjacent micronodular densities in a peribronchial distribution suggesting an infectious or  inflammatory process. Nontuberculous mycobacterial infection or other atypical agent would be a top considerations. 3.Small bilateral pleural effusions, slightly increased from prior. There is consolidation in the lung bases bilaterally which may be due to atelectasis or additional infiltrates. 4.Increasing pericardial effusion, now measuring up to 2.6 cm posteriorly on the right. 5.Enlarged right paratracheal lymph node measuring up to 1.4 cm, likely reactive. Hilar adenopathy is also suspected, but not well characterized due to noncontrast technique. 6.Additional findings as given above. Electronically Signed: Deshawn Soto MD  1/24/2025 9:43 AM EST  Workstation ID: [id number]    XR Chest 1 View    Result Date: 1/23/2025  Narrative: XR CHEST 1 VW Date of Exam: 1/23/2025 4:47 PM EST Indication: SOA Triage Protocol Comparison: Chest AP dated 12/5/2024 Findings: There is an abandoned segment of a left subclavian central venous catheter measuring 17.3 cm in length. A right subclavian port infusion  catheter terminates with the tip in the proximal superior vena cava. Patchy airspace opacities are again noted in the lung fields bilaterally, most predominantly in the mid to upper right lung field. Patient is rotated to the right. Heart size is favored to remain within normal limits. No definite effusion is seen.     Impression: Impression: Patchy chronic bilateral lung infiltrates. No acute infiltrate. Electronically Signed: Amado Salmeron MD  1/23/2025 5:11 PM EST  Workstation ID: [id number]    XR Chest 1 View    Result Date: 1/17/2025  Narrative: PORTABLE CHEST    1/17/2025 6:20 PM HISTORY: SHORTNESS OF BREATH COUGH COMPARISON: Multiple prior studies. FINDINGS: The heart is proper size. The mediastinum is unremarkable. Persistent diffuse airspace opacities in the upper and lower lung zones. Cystic bronchiectasis is redemonstrated. Small right pleural effusion. There are unchanged support lines.    Impression: Persistent multifocal pneumonia. Images reviewed, interpreted, and dictated by Dr. Dhruv Duncan. Transcribed by Rosie Monsalve.    CT Chest Without Contrast Diagnostic    Result Date: 1/17/2025  Narrative: CT CHEST WITHOUT CONTRAST HISTORY: Shortness of air, cough COMPARISON: February 11, 2024 FINDINGS: Axial CT images of the chest were obtained without contrast. Sagittal and coronal reformatted images were also obtained and reviewed. This study was performed with techniques to keep radiation doses as low as reasonably achievable, (ALARA). Individualized dose reduction techniques using automated exposure control or adjustment of mA and/or kV according to the patient size were employed. Bilateral deep lines are present. Small mediastinal nodes are seen without evidence of adenopathy. A new small pericardial effusion is noted measuring up to 13 mm in thickness. No axillary mass or adenopathy is present. There is mild bilateral gynecomastia. Widespread cystic bronchiectasis is again seen involving both  lungs. There has been significant interval improvement in the multifocal nodular opacities seen on the prior exam felt to represent multifocal pneumonia. Mild atelectasis is noted at the lung bases. A small right pleural effusion is seen. No chest wall abnormality is identified. Limited images of the upper abdomen reveal postoperative changes from cholecystectomy.    Impression: Stable widespread cystic bronchiectasis in both lungs. Improved multifocal pneumonia since the prior study. New small pericardial effusion. Images reviewed, interpreted, and dictated by Francois Hatch MD      Medications Reviewed:  arformoterol, 15 mcg, Nebulization, BID - RT  aspirin, 81 mg, Oral, QAM  atorvastatin, 20 mg, Oral, Daily  budesonide, 0.5 mg, Nebulization, BID  bumetanide, 2 mg, Oral, BID  busPIRone, 15 mg, Oral, BID  carvedilol, 25 mg, Oral, Q12H  collagenase, 1 Application, Topical, Daily  [Held by provider] enoxaparin, 40 mg, Subcutaneous, Nightly  famotidine, 20 mg, Oral, QAM  ferrous sulfate, 325 mg, Oral, Daily With Breakfast  finasteride, 5 mg, Oral, Daily  guaiFENesin, 600 mg, Oral, Q12H  insulin lispro, 2-9 Units, Subcutaneous, 4x Daily AC & at Bedtime  losartan, 50 mg, Oral, Q24H  montelukast, 10 mg, Oral, Nightly  revefenacin, 175 mcg, Nebulization, Daily - RT  sodium chloride, 10 mL, Intravenous, Q12H  sodium chloride, 10 mL, Intravenous, Q12H  tamsulosin, 0.4 mg, Oral, Daily  tobramycin PF, 300 mg, Nebulization, BID - RT         Assessment/Plan:      PNA (pneumonia)    COPD exacerbation    Bronchiectasis without complication    Pneumonia due to Pseudomonas species    Bronchiectasis with acute exacerbation    Bacterial pneumonia    Perianal abscess    1. Incision and drainage of posterior perianal abscess 2. Sharp excisional debridement through skin and subcutaneous tissue in the posterior perianal area, 9 x 6 x 1 cm    Wound of gluteal cleft  Local wound care  Medical decision making:    MDRO Pseudomonas  pneumonia:  # Acute on chronic hypoxic respiratory failure status post bronchoscopy:  Weaned to 2 L of oxygen  -Pulmonology consult appreciated.  -Continue supportive care  -Continue Zosyn and tobramycin nebs  -Supportive care  -Serial labs  -Status post bronchoscopy January 28 and February 3     # Perianal abscess status post colostomy:  -Status post I&D January 26  -Status post diverting loop colostomy February 6  -Appreciate surgery recommendations  -Local wound care  Surgery input appreciated.    Hypervolemic hyponatremia:  Continue Bumex 2 mg twice daily as per nephrology input appreciated    Acute on chronic CKD stage II and III:  Continue to monitor creatinine is 2.62.  Nephrology input appreciated     # Diabetes mellitus type 2  -Insulin sliding scale, monitor requirements and adjust as needed     # Paroxysmal atrial fibrillation:  -Eliquis currently on hold due to anemia  -Continue to monitor and add back as appropriate    DVT Prophylaxis:    Lovenox    CODE STATUS:  Full code  Reason for continued hospitalization  and medical necessity :  Pseudomonas pneumonia and perianal abscess requiring further management    Orders:  CBC  CMP  IV Zosyn  Wound care  Nephrology consult  Savene sodium  Urine osmolality    Time spent:  31+ minute not only  including face-to-face rounding putting in the orders writing the note and all the conversation reviewing the records    This transcription was electronically signed.     Disposition:  {plan; Home with self-care    Diet: ADA 1800      Discussed with: Patient and family      This has been electronically signed by:  _______________________________    Khushbu Abbasi [email or web address]     At [facility], we believe that sharing information builds trust and better relationships. You are receiving this note because you recently visited [facility]. It is possible you will see health information before a provider has talked with you about it. This kind of information can  be easy to misunderstand. To help you fully understand what it means for your health, we urge you to discuss this note with your provider.           Part of this note may be an electronic transcription/translation of spoken language to printed ,text using the Dragon Dictation System.

## 2025-02-14 NOTE — PLAN OF CARE
Goal Outcome Evaluation:  Plan of Care Reviewed With: patient        Progress: no change  Outcome Evaluation: A/Ox4, able to make needs known. Weight assistance provided with q2h turns. Skin care provided. Stool ouptput per colostomy. Doyle catheter patent, with clear yellow urine. C/o soreness, refused pain medication. Position adjusted and pillow support in place. VSS.

## 2025-02-14 NOTE — PLAN OF CARE
Goal Outcome Evaluation:  Plan of Care Reviewed With: patient           Outcome Evaluation: Patient alert and oriented. Vital signs stable. Wound vac placed on sacral wound, wound care of left foot performed as ordered. Colostomy site clean, with adequate output. Position adjusted with 1-2 person assistance q2h. Doyle in place, draining well. No complaints of pain at this time. Continue plan of care.

## 2025-02-15 PROBLEM — N18.4 CHRONIC KIDNEY DISEASE, STAGE IV (SEVERE): Status: ACTIVE | Noted: 2025-02-15

## 2025-02-15 PROBLEM — Z86.2 HISTORY OF ANEMIA DUE TO CHRONIC KIDNEY DISEASE: Status: ACTIVE | Noted: 2023-01-13

## 2025-02-15 LAB
BACTERIA SPEC AEROBE CULT: NORMAL
BACTERIA SPEC AEROBE CULT: NORMAL
CALCIUM SPEC-SCNC: 8.9 MG/DL (ref 8.6–10.5)
GLUCOSE BLDC GLUCOMTR-MCNC: 123 MG/DL (ref 70–99)
GLUCOSE BLDC GLUCOMTR-MCNC: 175 MG/DL (ref 70–99)
GLUCOSE BLDC GLUCOMTR-MCNC: 220 MG/DL (ref 70–99)
GLUCOSE BLDC GLUCOMTR-MCNC: 225 MG/DL (ref 70–99)

## 2025-02-15 PROCEDURE — 94799 UNLISTED PULMONARY SVC/PX: CPT

## 2025-02-15 PROCEDURE — 82310 ASSAY OF CALCIUM: CPT | Performed by: STUDENT IN AN ORGANIZED HEALTH CARE EDUCATION/TRAINING PROGRAM

## 2025-02-15 PROCEDURE — 94761 N-INVAS EAR/PLS OXIMETRY MLT: CPT

## 2025-02-15 PROCEDURE — 94664 DEMO&/EVAL PT USE INHALER: CPT

## 2025-02-15 PROCEDURE — 82948 REAGENT STRIP/BLOOD GLUCOSE: CPT | Performed by: STUDENT IN AN ORGANIZED HEALTH CARE EDUCATION/TRAINING PROGRAM

## 2025-02-15 PROCEDURE — 25010000002 EPOETIN ALFA PER 1000 UNITS: Performed by: INTERNAL MEDICINE

## 2025-02-15 PROCEDURE — 63710000001 REVEFENACIN 175 MCG/3ML SOLUTION: Performed by: STUDENT IN AN ORGANIZED HEALTH CARE EDUCATION/TRAINING PROGRAM

## 2025-02-15 PROCEDURE — 63710000001 INSULIN LISPRO (HUMAN) PER 5 UNITS: Performed by: STUDENT IN AN ORGANIZED HEALTH CARE EDUCATION/TRAINING PROGRAM

## 2025-02-15 PROCEDURE — 99232 SBSQ HOSP IP/OBS MODERATE 35: CPT | Performed by: INTERNAL MEDICINE

## 2025-02-15 PROCEDURE — 82948 REAGENT STRIP/BLOOD GLUCOSE: CPT

## 2025-02-15 RX ORDER — BUMETANIDE 1 MG/1
1 TABLET ORAL 2 TIMES DAILY
Status: DISCONTINUED | OUTPATIENT
Start: 2025-02-15 | End: 2025-02-17

## 2025-02-15 RX ADMIN — BUSPIRONE HYDROCHLORIDE 15 MG: 5 TABLET ORAL at 20:37

## 2025-02-15 RX ADMIN — GUAIFENESIN 600 MG: 600 TABLET ORAL at 08:04

## 2025-02-15 RX ADMIN — TOBRAMYCIN 300 MG: 300 SOLUTION RESPIRATORY (INHALATION) at 09:30

## 2025-02-15 RX ADMIN — ERYTHROPOIETIN 40000 UNITS: 40000 INJECTION, SOLUTION INTRAVENOUS; SUBCUTANEOUS at 12:00

## 2025-02-15 RX ADMIN — FINASTERIDE 5 MG: 5 TABLET, FILM COATED ORAL at 08:04

## 2025-02-15 RX ADMIN — BUMETANIDE 2 MG: 1 TABLET ORAL at 08:08

## 2025-02-15 RX ADMIN — GUAIFENESIN 600 MG: 600 TABLET ORAL at 20:36

## 2025-02-15 RX ADMIN — ATORVASTATIN CALCIUM 20 MG: 10 TABLET, FILM COATED ORAL at 08:05

## 2025-02-15 RX ADMIN — INSULIN LISPRO 2 UNITS: 100 INJECTION, SOLUTION INTRAVENOUS; SUBCUTANEOUS at 12:00

## 2025-02-15 RX ADMIN — BUMETANIDE 1 MG: 1 TABLET ORAL at 20:37

## 2025-02-15 RX ADMIN — Medication 10 ML: at 08:04

## 2025-02-15 RX ADMIN — Medication 10 ML: at 20:37

## 2025-02-15 RX ADMIN — ARFORMOTEROL TARTRATE 15 MCG: 15 SOLUTION RESPIRATORY (INHALATION) at 19:35

## 2025-02-15 RX ADMIN — FAMOTIDINE 20 MG: 20 TABLET ORAL at 05:55

## 2025-02-15 RX ADMIN — LOSARTAN POTASSIUM 50 MG: 50 TABLET, FILM COATED ORAL at 08:08

## 2025-02-15 RX ADMIN — BUDESONIDE 0.5 MG: 0.5 INHALANT RESPIRATORY (INHALATION) at 19:34

## 2025-02-15 RX ADMIN — BUSPIRONE HYDROCHLORIDE 15 MG: 5 TABLET ORAL at 08:05

## 2025-02-15 RX ADMIN — ASPIRIN 81 MG: 81 TABLET, COATED ORAL at 05:55

## 2025-02-15 RX ADMIN — REVEFENACIN 175 MCG: 175 SOLUTION RESPIRATORY (INHALATION) at 09:30

## 2025-02-15 RX ADMIN — ARFORMOTEROL TARTRATE 15 MCG: 15 SOLUTION RESPIRATORY (INHALATION) at 09:30

## 2025-02-15 RX ADMIN — CARVEDILOL 25 MG: 25 TABLET, FILM COATED ORAL at 08:08

## 2025-02-15 RX ADMIN — INSULIN LISPRO 4 UNITS: 100 INJECTION, SOLUTION INTRAVENOUS; SUBCUTANEOUS at 20:36

## 2025-02-15 RX ADMIN — TOBRAMYCIN 300 MG: 300 SOLUTION RESPIRATORY (INHALATION) at 20:15

## 2025-02-15 RX ADMIN — BUDESONIDE 0.5 MG: 0.5 INHALANT RESPIRATORY (INHALATION) at 09:30

## 2025-02-15 RX ADMIN — COLLAGENASE SANTYL 1 APPLICATION: 250 OINTMENT TOPICAL at 11:01

## 2025-02-15 RX ADMIN — INSULIN LISPRO 4 UNITS: 100 INJECTION, SOLUTION INTRAVENOUS; SUBCUTANEOUS at 17:22

## 2025-02-15 RX ADMIN — CARVEDILOL 25 MG: 25 TABLET, FILM COATED ORAL at 20:37

## 2025-02-15 RX ADMIN — TAMSULOSIN HYDROCHLORIDE 0.4 MG: 0.4 CAPSULE ORAL at 08:05

## 2025-02-15 RX ADMIN — MONTELUKAST 10 MG: 10 TABLET, FILM COATED ORAL at 20:36

## 2025-02-15 RX ADMIN — FERROUS SULFATE TAB 325 MG (65 MG ELEMENTAL FE) 325 MG: 325 (65 FE) TAB at 08:04

## 2025-02-15 NOTE — PLAN OF CARE
Goal Outcome Evaluation:  Plan of Care Reviewed With: patient        Progress: improving  Outcome Evaluation: AOx4, wound vac in place with good seal. Wound dressings changed per orders. No complaints of pain this shift. U4gnrzo tolerated well, patient occassionally repositions himself. Colostomy in place with liquid dark brown output. ACHS, insulin administered per MAR. Doyle catheter patent. VSS.

## 2025-02-15 NOTE — SIGNIFICANT NOTE
Wound Eval / Progress Noted    YAIMA Stockton     Patient Name: Preston Wallis  : 1965  MRN: [id number]  Today's Date: 2025                 Admit Date: 2025    Visit Dx:    ICD-10-CM ICD-9-CM   1. Pneumonia due to Pseudomonas species, unspecified laterality, unspecified part of lung  J15.1 482.1   2. Urinary tract infection associated with indwelling urethral catheter, initial encounter  T83.511A 996.64    N39.0 599.0   3. Perianal abscess  K61.0 566   4. COPD exacerbation  J44.1 491.21   5. Bronchiectasis without complication  J47.9 494.0   6. Allergy, initial encounter  T78.40XA 995.3   7. Acute hypoxic on chronic hypercapnic respiratory failure  J96.01 518.84    J96.12    8. Tobacco abuse, in remission  F17.201 305.1   9. Chronic dyspnea  R06.09 786.09   10. Obstructive sleep apnea  G47.33 327.23   11. Chronic respiratory failure with hypoxia and hypercapnia  J96.11 518.83    J96.12 799.02     786.09   12. Chronic obstructive pulmonary disease, unspecified COPD type  J44.9 496   13. Wound of gluteal cleft, unspecified laterality, subsequent encounter  S31.809D V58.89     877.0   14. Pneumonia of both lower lobes due to Pneumocystis jirovecii  B59 136.3   15. Bacterial pneumonia  J15.9 482.9   16. Bronchiectasis with acute exacerbation  J47.1 494.1   17. Pneumonia of both lungs due to Pseudomonas species, unspecified part of lung  J15.1 482.1   18. Difficulty walking  R26.2 719.7         PNA (pneumonia)    COPD exacerbation    Bronchiectasis without complication    Pneumonia due to Pseudomonas species    Bronchiectasis with acute exacerbation    Bacterial pneumonia    Perianal abscess    1. Incision and drainage of posterior perianal abscess 2. Sharp excisional debridement through skin and subcutaneous tissue in the posterior perianal area, 9 x 6 x 1 cm    Wound of gluteal cleft        Past Medical History:   Diagnosis Date    1. Incision and drainage of posterior perianal abscess 2. Sharp excisional  "debridement through skin and subcutaneous tissue in the posterior perianal area, 9 x 6 x 1 cm 01/26/2025    Age-related cognitive decline     Allergic contact dermatitis     Allergies     Anemia     Bedbound     11/2023 \"MY LEG MUSCLES STOPPED WORKING\"    Bronchiectasis with acute lower respiratory infection     Charcot foot due to diabetes mellitus 09/10/2013    Chronic diastolic (congestive) heart failure     Chronic kidney disease     Chronic respiratory failure with hypoxia     Closed supracondylar fracture of femur 01/12/2022    COPD (chronic obstructive pulmonary disease)     Deep vein thrombosis (DVT) of lower extremity associated with air travel 01/13/2023    Dependence on supplemental oxygen     Eczema     Erectile dysfunction     due to organic reasons    Essential (primary) hypertension     Fracture     closed fracture of other tarsal and metatarsal bones    Fracture of proximal humerus 01/13/2023    GERD without esophagitis     High risk medication use     Hypercholesteremia     Hypomagnesemia     Infected stasis ulcer of left lower extremity 01/13/2023    Insomnia     Low back pain     Major depressive disorder     Morbid (severe) obesity due to excess calories     MRSA pneumonia     Muscle weakness     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 without evidence of necrosis     Obstructive sleep apnea (adult) (pediatric)     On home O2     REPORTS WEARING 2L/NC AAT    Other forms of dyspnea     Other long term (current) drug therapy     Other specified noninfective gastroenteritis and colitis     Other spondylosis, lumbar region     Pain in both knees     Paroxysmal atrial fibrillation     Peripheral neuropathy     attributed to type 2 diabetes    Pneumonia, unspecified organism     Polyneuropathy     Rash and other nonspecific skin eruption     Self-catheterizes urinary bladder     EVERY 4 HOURS    Smoking     \"SOMETIMES\"    Syncope and collapse     Tachycardia  "    Tinnitus 01/13/2023    Type 1 diabetes mellitus with diabetic chronic kidney disease     Type 2 diabetes mellitus     Unspecified fall, initial encounter     Urinary retention         Past Surgical History:   Procedure Laterality Date    BRONCHOSCOPY N/A 1/28/2025    Procedure: BRONCHOSCOPY: BAL: insertion of lighted instrument to view inside the lung;  Surgeon: Walter Nicole DO;  Location: [facility] MAIN OR;  Service: Pulmonary;  Laterality: N/A;    BRONCHOSCOPY N/A 2/3/2025    Procedure: BRONCHOSCOPY WITH BRONCHOALVEOLAR LAVAGE, POSSIBLE BIOPSY, BRUSHING, WASHING, AIRWAY INSPECTION: insertion of lighted instrument to view inside the lung;  Surgeon: Chadd Swan MD;  Location: [facility] MAIN OR;  Service: Pulmonary;  Laterality: N/A;    CARDIAC CATHETERIZATION Left 8/15/2024    Procedure: Carbon dioxide aortogram with left leg angiogram, possible angioplasty or stenting;  Surgeon: Moshe Willson MD;  Location: [facility] CATH INVASIVE LOCATION;  Service: Vascular;  Laterality: Left;    CHOLECYSTECTOMY      COLOSTOMY N/A 2/6/2025    Procedure: COLOSTOMY LAPAROSCOPIC; plain text: creation of colostomy using small incisions;  Surgeon: Emerson Canada MD;  Location: [facility] OR [facility];  Service: General;  Laterality: N/A;    CYSTOSCOPY      FEMUR SURGERY Left     Shravan placed    INCISION AND DRAINAGE ABSCESS N/A 1/26/2025    Procedure: INCISION AND DRAINAGE ABSCESS; plain text: incision and drain pus from buttocks wound;  Surgeon: Emerson Canada MD;  Location: [facility] OR [facility];  Service: General;  Laterality: N/A;    KNEE SURGERY Left     OTHER SURGICAL HISTORY Left     venous port, REMOVED    PORTACATH PLACEMENT Right     TIBIAL PLATEAU OPEN REDUCTION INTERNAL FIXATION Left 12/22/2023    Procedure: TIBIAL PLATEAU OPEN REDUCTION INTERNAL FIXATION;  Surgeon: Hugo Kline MD;  Location: [facility] OR;  Service: Orthopedics;  Laterality: Left;    TONSILLECTOMY AND ADENOIDECTOMY           Physical  "Assessment:  Wound 01/23/25 2330 coccyx pressure injury (Active)   Wound Image    02/14/25 1530   Pressure Injury Stage 3 02/14/25 1530   Dressing Appearance dry;intact 02/14/25 1530   Closure None 02/14/25 1530   Base moist;red;pink;yellow;slough 02/14/25 1530   Red (%), Wound Tissue Color 50 02/14/25 1530   Yellow (%), Wound Tissue Color 50 02/14/25 1530   Periwound dry;pink 02/14/25 1530   Periwound Temperature warm 02/14/25 1530   Periwound Skin Turgor soft 02/14/25 1530   Edges open 02/14/25 1530   Wound Length (cm) 7 cm 02/14/25 1530   Wound Width (cm) 5 cm 02/14/25 1530   Wound Depth (cm) 1 cm 02/14/25 1530   Wound Surface Area (cm^2) 35 cm^2 02/14/25 1530   Wound Volume (cm^3) 35 cm^3 02/14/25 1530   Drainage Characteristics/Odor serosanguineous 02/14/25 1530   Drainage Amount scant 02/14/25 1530   Care, Wound cleansed with;irrigated with;sterile normal saline;negative pressure wound therapy 02/14/25 1530   Dressing Care dressing removed;dressing applied;foam;transparent film;hydrocolloid 02/14/25 1530   Periwound Care dry periwound area maintained 02/14/25 1530       NPWT (Negative Pressure Wound Therapy) 02/14/25 1530 Francisca-anal (Active)   Therapy Setting continuous therapy 02/14/25 1530   Dressing foam, black;foam, white;transparent dressing 02/14/25 1530   Pressure Setting 125 mmHg 02/14/25 1530   Sponges Inserted 2;other (see comments) 02/14/25 1530   Finger sweep complete Yes 02/14/25 1530 02/14/25 1530   Colostomy   Placement date: If unknown, DO NOT use \"Add Comment\" note/Placement time: If unknown, DO NOT use \"Add Comment\" note: 02/06/25 1322   Hand Hygiene Completed: Yes   Stomal Appliance 1 piece;Clean;Dry;Intact;Drainable   Stoma Appearance round;dusky;moist;bridge in place;protruding above skin level   Peristomal Assessment AMINA   Stoma Function flatus;stool   Stool Color brown   Stool Consistency loose   Treatment Placement checked   Output (mL) 100 mL     Wound Check / Follow-up:  Patient " seen today for ostomy education and wound VAC placement.  Patient is awake, alert, and oriented. Patient underwent an incision and drainage of perianal abscess on 1/26/2025. Patient underwent diverting laprascopic colostomy placement on 2/6/25 to assist with healing of his perianal abscess. Reviewed ostomy education with patient to include managing ostomy, diet management with ostomy, and when to perform a pouch change; questions encouraged and answered.     Loop colostomy noted to lower aspect of abdomen. Pouching system is intact with no signs of lifting or leaking. 100ml of loose brown stool emptied from pouching system. Pouching system was cleansed with water to visualize stoma. Stoma is noted to be round, moist, dusky, and protruding above skin level, with an ostomy bar in place.  Unable to visualize peristomal tissue.  Recommending to continue diligent ostomy care.      Stage III pressure injury noted to the perianal region post incision and drainage.  Wound base presents with moist red tissue and yellow slough, with an area of dark red tissue from 5 to 6 o'clock.  Periwound tissue is dry with blanchable pink/redness. Cleansed and irrigated with normal saline and gauze, blotted dry. Skin prep applied to periwound tissue. Periwound tissue was draped with transparent film. Strips of hydrocolloid dressing were applied to wound edges from 5 to 6 o'clock and 6 to 7 o'clock, and a piece of Vanita's ring was applied at 6 o'clock to assist with seal. One piece of white foam placed within wound base near 6 o'clock. Remaining wound base was then lightly filled with one piece of black foam. Foams were draped with transparent film. A bridge was made with a second piece of black foam and draped with transparent film. Dressing was connected to wound VAC suction at 125 mmHg.  Seal obtained with no signs of lifting or leaking. Recommending to continue wound VAC therapy with dressing changes three times a week. Recommending  quality skin care and hygiene to skin surrounding wound VAC dressing with application of blue top moisture barrier 4 times a day and as needed for incontinence.  Implement every 2 hour turns and offload at all times.  Keep patient clean, dry, and free from all moisture.       Impression: Surgical incision with delayed closure/Stage III pressure injury to perianal region with initiation of wound VAC therapy. Loop Colostomy.     Short term goals: Regain skin integrity, skin protection, moisture prevention, pressure reduction, quality skin care and hygiene, negative pressure wound VAC therapy, colostomy management.    Amparo Novoa RN    2/14/2025    22:24 EST

## 2025-02-15 NOTE — PROGRESS NOTES
[facility] Progress Note      Patient Name:  Preston Wallis   MRN:  [id number]   :  1965   Date of Admission:  2025   Date of Service:  2/15/2025   PMD:  Kimmy Riley MD     Hospital Course:     59 y.o. male past medical history of COPD, hypertension, CHF, history of MDRO presents to the ED due to shortness of breath. Patient was discharged on  for MDRO pseudomonas pneumonia on IV ertapenem.      Patient admitted for shortness of breath.  Chest x-ray shows chronic bilateral lung infiltrates.  CT was performed to arrival of the chest which demonstrates new spiculated left lower lobe nodule, bronchiectasis throughout both lungs, micronodule or peribronchial densities suggestive of atypical infection, right paratracheal lymph node and enlarging pericardial effusion.  Pulmonology and cardiology services consulted.  TTE demonstrates very small pericardial effusion only, estimated EF 49%, with some grade 1 diastolic dysfunction left ventricle.  Wound care noted necrotic tissue perianal region.  CT abdomen pelvis was performed, 4.5 cm x 1.8 cm x 3.5 cm posterior anal abscess noted.  General surgery consulted, patient underwent incision and drainage .  S/p bronchoscopy 2025, BAL grew MDRO Pseudomonas, he completed 7 days of IV meropenem and discontinued on MOIZ nebs.  Wound culture positive for Enterococcus VRE and MDRO Citrobacter, currently receiving IV linezolid and IV Zosyn.  General surgery following and planning diverting colostomy for wound healing.  Underwent laparoscopic diverting loop colostomy procedure along with primary repair of chronically incarcerated 3 cm umbilical hernia on 2025.  General surgery on board.     Interval Followup: Patient had clot with sanguinous output from his perianal wound, receiving wound care.  Colostomy site looks clean with minimal bloody output.  Hemoglobin 6 today, transfusing 3 units of PRBC.  Patient laying comfortably  in bed, no active complaints.  Leukocytosis improving.  Family at bedside, updated.       Consultants:    Neurosurgery  Pulmonology    Procedures:  Chest x-ray    Anti-infectives:    IV Zosyn    February 10, 2025    Chief Complaint: Patient with shortness of breath off and on since several days    Subjective:   Shortness of breath feels better today, seen by surgery had diverting colostomy.    February 11 , 2025    Chief Complaint: Patient with shortness of breath off and on since several days    Subjective: Feels better today less short of breath on 2 L nasal cannula has a lot of weakness and fatigue.  UA positive for yeast  Has worsening renal failure creatinine is 2.54.    February 12 , 2025:    Chief Complaint: Patient with shortness of breath off and on since several days    Subjective:   Colostomy has a good output, creatinine is stable.  O2 saturation is 100%.    February 13 , 2025:    Chief Complaint: Patient with shortness of breath off and on since several days    Subjective:  Advised by nephrology continue Bumex 2 mg twice daily.  Getting local wound care and excisional debridement    February 14 , 2025:    Chief Complaint: Patient with shortness of breath off and on since several days    Subjective:/events since last 24 hours:  Continue local wound care, creatinine is stable seen by nephrology,.    February 15 , 2025:    Chief Complaint: Patient with shortness of breath off and on since several days    Subjective:/events since last 24 hours:  Improved shortness of breath, on IV Zosyn for MDR blood pressure is much better  Bumex decreased by nephrology to 1 mg.    Review Of Systems:  GENERAL: Complains of improving weakness and fatigue, denies any weight loss appetite is normal.  HEENT:  Denies any rhinitis, no sore throat, no diplopia , hearing is normal  Respiratory: Complains of improving shortness of breath , no sweating d complaint improving Dyspnea on exertion , cough or sputum.  CVS:  Denies any  chest pain , palpitation or syncope.  Gi:  No nausea, no vomiting, no diarrhea, no hematemesis , no melena , no rectal bleeding  :  No dysuria frequency , hematuria, no retention:  Musculoskeletal:  Denies any arthritis, or calf pain  Incision and drainage of posterior perianal abscess 2.          Sharp excisional debridement through skin and subcutaneous tissue in the posterior perianal area, 9 x 6 x 1 cm    Wound of gluteal cleft.  Hematological:  No bleeding , no petechiae.  Skin:  Denies rash , pruritus , jaundice  Endocrine:  No polyuria , polydipsia , polyphagia.  CNS:  Denies any confusion , headache , or seizure disorder  Psychiatry:  No anxiety , or depression, no suicide ideation      Objective:  Vitals:    02/15/25 0807 02/15/25 0808 02/15/25 0930 02/15/25 1100   BP: 146/52 138/60  128/50   BP Location:    Left arm   Patient Position:    Sitting   Pulse:   83    Resp:   16    Temp:       TempSrc:       SpO2:   96%    Weight:       Height:          Physical Exam:  GENERAL APPEARANCE: Alert and oriented.  Appears comfortable.  No acute distress  HEENT: Atraumatic, normocephalic,  PERRLA, mucous membranes moist  NECK: supple; no JVD or thyromegaly noted  CARDIOVASCULAR: Regular rate and rhythm, no murmurs appreciated; no edema present in BLE   RESPIRATORY: Improving Harsh vesicular breathing with scattered rhonchi and crepitation.  GASTROINTESTINAL:  Abdomen  soft; bowel sounds present, non-tender, non-distended, no organomegaly, no CVA tenderness   EXTREMITIES: Pulses equal bilaterally   MUSCULOSKELETAL:  Good muscle strength noted no obvious deformity appreciate.          PSYCH: Appropriate mood and affect  Neurologic:  Alert & oriented x 3.  Normal Mental status.  Normal Cranial Nervies.  EOMI.  FABIO.  Normal 5/5 muscular strength in both upper and lower extremities.  Normal sensation.  Normal and symmetric reflexes. Normal cerbellar function.  Normal Gait. Negative Babinski.    Labs Reviewed  CBC:   "  Lab Results   Component Value Date    WBC 14.04 (H) 02/12/2025    HGB 9.4 (L) 02/12/2025    HCT 29.2 (L) 02/12/2025    MCV 90.4 02/12/2025     CMP:    Lab Results   Component Value Date     02/14/2025    CO2 29.5 (H) 02/14/2025    GLUCOSE 135 (H) 02/14/2025    BUN 58 (H) 02/14/2025    CREATININE 2.62 (H) 02/14/2025    ALBUMIN 2.5 (L) 02/14/2025    CALCIUM 8.9 02/15/2025    AST 17 02/10/2025    ALT 21 02/10/2025     Lipis Panel:   Lab Results   Component Value Date    CHOL 116 01/25/2025    HDL 54 01/25/2025      INR:    Lab Results   Component Value Date    INR 1.21 (H) 02/16/2024     Cardiac:    Lab Results   Component Value Date    CKMB 14.07 (H) 02/22/2024     No results found for: \"BNP\"  Lactate:    Lab Results   Component Value Date    LACTATE 1.1 02/10/2025    LACTATE 0.9 01/23/2025    LACTATE 0.69 04/29/2024     Blood Culture:  @lastlabx(cult:2)@    Imaging:  XR Chest 1 View    Result Date: 2/5/2025  Narrative: XR CHEST 1 VW Date of Exam: 2/5/2025 7:13 AM EST Indication: Pneumonia Comparison: Chest radiograph dated 1/23/2025, CT chest dated 1/24/2025 Findings: The patient is rotated. There is a right-sided chest port with tip terminating at the upper SVC. There is a left-sided subclavian line with tip terminating also at the upper SVC. There is cardiomegaly. There is bilateral upper lobe airspace opacity corresponding to areas of known bronchiectasis seen on the prior examination. There is right basilar airspace disease. There are probable trace bilateral pleural effusions. No visible pneumothorax.     Impression: Impression: 1.Bilateral upper lobe airspace opacities corresponding to areas of bronchiectasis seen on the prior examination. 2.Right basilar airspace disease which may represent atelectasis or pneumonia. 3.Trace bilateral pleural effusions. 4.Cardiomegaly. Electronically Signed: Mynor Singleton  2/5/2025 7:47 AM EST  Workstation ID: [id number]    Stress Test With Myocardial Perfusion One " Day    Result Date: 1/27/2025  Narrative:   Left ventricular ejection fraction is mildly reduced (Calculated EF = 40%).   Low probability of ischemia during this study, patient may had an apical infarct versus apical thinning..   Findings consistent with an equivocal ECG stress test.     CT Abdomen Pelvis Without Contrast    Result Date: 1/25/2025  Narrative: CT ABDOMEN PELVIS WO CONTRAST Date of Exam: 1/25/2025 10:15 AM EST Indication: perirectal abscess. Comparison: None available. Technique: Axial CT images were obtained of the abdomen and pelvis without the administration of contrast. Reconstructed coronal and sagittal images were also obtained. Automated exposure control and iterative construction methods were used. Findings: LUNG BASES: Minimal basal atelectasis with trace effusions. Heart is enlarged. There is a 10 mm thickness pericardial effusion. LIVER:  Unremarkable parenchyma without focal lesion. BILIARY/GALLBLADDER: Cholecystectomy SPLEEN:  Unremarkable PANCREAS:  Unremarkable ADRENAL:  Unremarkable KIDNEYS: Mild bilateral renal cortical atrophy with no solid mass identified. No obstruction.  No calculus identified. GASTROINTESTINAL/MESENTERY:  No evidence of obstruction nor inflammation.  No evidence of acute appendicitis. There is minimal right paracolic free fluid. AORTA/IVC:  Normal caliber. RETROPERITONEUM/LYMPH NODES:  Unremarkable REPRODUCTIVE:  Unremarkable BLADDER: Doyle catheter is present. OSSEUS STRUCTURES: No acute osseous process is identified. There is a posterior perianal abscess measuring 4.5 cm maximum AP dimension by 1.8 cm maximum transverse dimension by 3.5 cm in maximum CC dimension. This extends to the deep intergluteal crease skin surface. No intrapelvic extension noted.     Impression: Impression: 1.There is a posterior perianal abscess as detailed above. No intrapelvic extension. 2.Cardiomegaly with relatively small pericardial effusion. There is bibasilar atelectasis with  trace pleural effusions. 3.Other incidental nonemergent findings as detailed above. Electronically Signed: Rob Milner MD  1/25/2025 10:55 AM EST  Workstation ID: [id number]    Adult Transthoracic Echo Complete W/ Cont if Necessary Per Protocol    Result Date: 1/24/2025  Narrative:   Left ventricular systolic function is low normal. Calculated left ventricular EF = 49.4%   Left ventricular wall thickness is consistent with moderate concentric hypertrophy.   Left ventricular diastolic function is consistent with (grade I) impaired relaxation.   There is a small (<1cm) pericardial effusion adjacent to the right ventricle.     CT Chest Without Contrast Diagnostic    Result Date: 1/24/2025  Narrative: CT CHEST WO CONTRAST DIAGNOSTIC Date of Exam: 1/24/2025 7:48 AM EST Indication: Shortness of breath.. Comparison: 10/22/2024 Technique: Axial CT images were obtained of the chest without contrast administration.  Reconstructed coronal and sagittal images were also obtained. Automated exposure control and iterative construction methods were used. Findings: There is a new somewhat spiculated nodular focus in the superior left lower lobe image #66 of series 2. The rapid interval development suggests an infectious or inflammatory process. Short-term follow-up CT suggested. Stable appearance of verrucous and cystic bronchiectasis involving all lobes of the lungs, but greatest in the right upper lobe and lower lobes. There are again adjacent micronodular densities peribronchial distribution suggesting suggesting an infectious or inflammatory process. Nontuberculous mycobacterial infection or other atypical agent would be a top considerations. There is layering mucoid material in the trachea and right main bronchus. There is also probable mucoid impaction within areas of bronchiectasis noted. Small bilateral pleural effusions are noted, slightly increased from prior. There is consolidation in the lung bases bilaterally which may  be due to atelectasis or additional infiltrates. Right subclavian Mmrqod-w-Qjfc catheter again noted. Retained tubing from left subclavian Cexqjw-x-Kwrx catheter again noted. There is an increasing pericardial effusion, measuring up to 2.6 cm posteriorly on the right. Enlarged right paratracheal lymph node is noted measuring up to 1.4 cm, likely reactive. Hilar adenopathy is also suspected, but not well characterized due to noncontrast technique. Thyroid is enlarged and heterogeneous which may be due to goitrous change. Limited imaging through the upper abdomen demonstrates postcholecystectomy change. The adrenals have a grossly normal morphology. There is degenerative change in the spine.     Impression: Impression: 1.There is a new somewhat spiculated nodular focus in the superior left lower lobe image #66 of series. The rapid interval development suggests an infectious or inflammatory process. 3-month follow-up CT recommended per Fleischner guidelines. 2.Stable appearance of verrucous and cystic bronchiectasis involving all lobes of the lungs, but greatest in the right upper lobe and lower lobes. There are again adjacent micronodular densities in a peribronchial distribution suggesting an infectious or  inflammatory process. Nontuberculous mycobacterial infection or other atypical agent would be a top considerations. 3.Small bilateral pleural effusions, slightly increased from prior. There is consolidation in the lung bases bilaterally which may be due to atelectasis or additional infiltrates. 4.Increasing pericardial effusion, now measuring up to 2.6 cm posteriorly on the right. 5.Enlarged right paratracheal lymph node measuring up to 1.4 cm, likely reactive. Hilar adenopathy is also suspected, but not well characterized due to noncontrast technique. 6.Additional findings as given above. Electronically Signed: Deshawn Soto MD  1/24/2025 9:43 AM EST  Workstation ID: [id number]    XR Chest 1 View    Result Date:  1/23/2025  Narrative: XR CHEST 1 VW Date of Exam: 1/23/2025 4:47 PM EST Indication: SOA Triage Protocol Comparison: Chest AP dated 12/5/2024 Findings: There is an abandoned segment of a left subclavian central venous catheter measuring 17.3 cm in length. A right subclavian port infusion catheter terminates with the tip in the proximal superior vena cava. Patchy airspace opacities are again noted in the lung fields bilaterally, most predominantly in the mid to upper right lung field. Patient is rotated to the right. Heart size is favored to remain within normal limits. No definite effusion is seen.     Impression: Impression: Patchy chronic bilateral lung infiltrates. No acute infiltrate. Electronically Signed: Amado Salmeron MD  1/23/2025 5:11 PM EST  Workstation ID: [id number]    XR Chest 1 View    Result Date: 1/17/2025  Narrative: PORTABLE CHEST    1/17/2025 6:20 PM HISTORY: SHORTNESS OF BREATH COUGH COMPARISON: Multiple prior studies. FINDINGS: The heart is proper size. The mediastinum is unremarkable. Persistent diffuse airspace opacities in the upper and lower lung zones. Cystic bronchiectasis is redemonstrated. Small right pleural effusion. There are unchanged support lines.    Impression: Persistent multifocal pneumonia. Images reviewed, interpreted, and dictated by Dr. Dhruv Duncan. Transcribed by Rosie Monsalve.    CT Chest Without Contrast Diagnostic    Result Date: 1/17/2025  Narrative: CT CHEST WITHOUT CONTRAST HISTORY: Shortness of air, cough COMPARISON: February 11, 2024 FINDINGS: Axial CT images of the chest were obtained without contrast. Sagittal and coronal reformatted images were also obtained and reviewed. This study was performed with techniques to keep radiation doses as low as reasonably achievable, (ALARA). Individualized dose reduction techniques using automated exposure control or adjustment of mA and/or kV according to the patient size were employed. Bilateral deep lines are  present. Small mediastinal nodes are seen without evidence of adenopathy. A new small pericardial effusion is noted measuring up to 13 mm in thickness. No axillary mass or adenopathy is present. There is mild bilateral gynecomastia. Widespread cystic bronchiectasis is again seen involving both lungs. There has been significant interval improvement in the multifocal nodular opacities seen on the prior exam felt to represent multifocal pneumonia. Mild atelectasis is noted at the lung bases. A small right pleural effusion is seen. No chest wall abnormality is identified. Limited images of the upper abdomen reveal postoperative changes from cholecystectomy.    Impression: Stable widespread cystic bronchiectasis in both lungs. Improved multifocal pneumonia since the prior study. New small pericardial effusion. Images reviewed, interpreted, and dictated by Francois Hatch MD      Medications Reviewed:  arformoterol, 15 mcg, Nebulization, BID - RT  aspirin, 81 mg, Oral, QAM  atorvastatin, 20 mg, Oral, Daily  budesonide, 0.5 mg, Nebulization, BID  bumetanide, 1 mg, Oral, BID  busPIRone, 15 mg, Oral, BID  carvedilol, 25 mg, Oral, Q12H  collagenase, 1 Application, Topical, Daily  famotidine, 20 mg, Oral, QAM  ferrous sulfate, 325 mg, Oral, Daily With Breakfast  finasteride, 5 mg, Oral, Daily  guaiFENesin, 600 mg, Oral, Q12H  insulin lispro, 2-9 Units, Subcutaneous, 4x Daily AC & at Bedtime  losartan, 50 mg, Oral, Q24H  montelukast, 10 mg, Oral, Nightly  revefenacin, 175 mcg, Nebulization, Daily - RT  sodium chloride, 10 mL, Intravenous, Q12H  sodium chloride, 10 mL, Intravenous, Q12H  tamsulosin, 0.4 mg, Oral, Daily  tobramycin PF, 300 mg, Nebulization, BID - RT     Assessment/Plan:      COPD exacerbation    Bronchiectasis without complication    History of anemia due to chronic kidney disease    Pneumonia due to Pseudomonas species    Bronchiectasis with acute exacerbation    Perianal abscess    1. Incision and drainage of  posterior perianal abscess 2. Sharp excisional debridement through skin and subcutaneous tissue in the posterior perianal area, 9 x 6 x 1 cm    Wound of gluteal cleft    Chronic kidney disease, stage IV (severe)  Local wound care  Medical decision making:    MDRO Pseudomonas pneumonia:  # Acute on chronic hypoxic respiratory failure status post bronchoscopy:  Weaned to 2 L of oxygen  -Pulmonology consult appreciated.  -Continue supportive care  Completed Zosyn and continue tobramycin nebs  -Supportive care  -Serial labs  -Status post bronchoscopy January 28 and February 3     # Perianal abscess status post colostomy:  -Status post I&D January 26  -Status post diverting loop colostomy February 6  -Appreciate surgery recommendations  -Local wound care  Surgery input appreciated.    Hypervolemic hyponatremia:  Continue Bumex 2 mg twice daily as per nephrology input appreciated    Acute on chronic CKD stage II and III:  Continue to monitor creatinine is 2.62/today  Nephrology input appreciated     # Diabetes mellitus type 2  -Insulin sliding scale, monitor requirements and adjust as needed     # Paroxysmal atrial fibrillation:  -Eliquis currently on hold due to anemia  -Continue to monitor and add back as appropriate    DVT Prophylaxis:    Lovenox    CODE STATUS:  Full code      Reason for continued hospitalization  and medical necessity :  Pseudomonas pneumonia and perianal abscess requiring further management    Orders:  CBC  CMP  IV Zosyn  Wound care  Nephrology consult  Savene sodium  Urine osmolality    Time spent:  31+ minute not only  including face-to-face rounding putting in the orders writing the note and all the conversation reviewing the records    This transcription was electronically signed.     Disposition:  {plan; Home with self-care    Diet: ADA 1800      Discussed with: Patient and family      This has been electronically signed by:  _______________________________    Khushbu Abbasi MD.[email or web address].FACP.[facility]      At [facility], we believe that sharing information builds trust and better relationships. You are receiving this note because you recently visited [facility]. It is possible you will see health information before a provider has talked with you about it. This kind of information can be easy to misunderstand. To help you fully understand what it means for your health, we urge you to discuss this note with your provider.           Part of this note may be an electronic transcription/translation of spoken language to printed ,text using the Dragon Dictation System.

## 2025-02-15 NOTE — PROGRESS NOTES
[facility]     Progress Note    Patient Name: Preston Wallis  : 1965  MRN: [id number]  Primary Care Physician:  Kimmy Riley MD  Date of admission: 2025    Subjective patient is fully awake alert responsive.  Blood pressure is stable volume status is euvolemic to little bit on the dry side    Review of Systems  All review of systems are negative except as mentioned in subjective complaints.    Objective   Objective     Vitals:   Temp:  [97.7 °F (36.5 °C)-98.8 °F (37.1 °C)] 97.7 °F (36.5 °C)  Heart Rate:  [79-86] 83  Resp:  [16-18] 16  BP: (128-151)/(40-66) 128/50  Flow (L/min) (Oxygen Therapy):  [2] 2  Physical Exam    Constitutional: Awake, alert responsive, conversant, no obvious distress              Psychiatric:  Appropriate affect, cooperative   Neurologic:  Awake alert ,oriented x 3, strength symmetric in all extremities, Cranial Nerves grossly intact to confrontation, speech clear   Eyes:   PERRLA, sclerae anicteric, no conjunctival injection   HEENT:  Moist mucous membranes, no nasal or eye discharge, no throat congestion   Neck:   Supple, no thyromegaly, no lymphadenopathy, trachea midline, no elevated JVD   Respiratory:  Clear to auscultation bilaterally, nonlabored respirations    Cardiovascular: RRR, no murmurs, rubs, or gallops, palpable pedal pulses bilaterally, No bilateral ankle edema   Gastrointestinal: Positive bowel sounds, soft, nontender, nondistended, no organomegaly   Musculoskeletal:  No clubbing or cyanosis to extremities,muscle wasting, joint swelling, muscle weakness             Skin:                      No rashes, bruising, skin ulcers, petechiae or ecchymosis    Result Review    Result Review:  I have personally reviewed the results from the time of this admission to 2/15/2025 11:17 EST and agree with these findings:  []  Laboratory  []  Microbiology  []  Radiology  []  EKG/Telemetry   []  Cardiology/Vascular   []  Pathology  []  Old records  []   Other:    Results from last 7 days   Lab Units 02/12/25  0410 02/11/25  0430 02/10/25  0411 02/09/25  0346   WBC 10*3/mm3 14.04* 13.36* 13.84* 14.76*   HEMOGLOBIN g/dL 9.4* 9.0* 9.1* 8.5*   PLATELETS 10*3/mm3 325 279 229 192     Results from last 7 days   Lab Units 02/15/25  0559 02/14/25  0543 02/13/25  0356 02/12/25  0410 02/11/25  0430 02/10/25  1014 02/10/25  0411 02/09/25  0346   SODIUM mmol/L  --  137  --  136 139 138 138 135*   POTASSIUM mmol/L  --  4.2  --  4.0 4.1 4.1 4.3 3.9   CHLORIDE mmol/L  --  99  --  96* 98 96* 98 96*   CO2 mmol/L  --  29.5*  --  29.7* 29.5* 30.8* 30.6* 29.2*   ANION GAP mmol/L  --  8.5  --  10.3 11.5 11.2 9.4 9.8   BUN mg/dL  --  58*  --  59* 60* 58* 63* 58*   CREATININE mg/dL  --  2.62*  --  2.49* 2.54* 2.49* 2.70* 2.58*   GLUCOSE mg/dL  --  135*  --  173* 137* 250* 181* 162*   EGFR mL/min/1.73  --  27.3*  --  29.0* 28.3* 29.0* 26.3* 27.8*   CALCIUM mg/dL 8.9 8.7   < > 8.7 8.7 8.5* 8.4* 8.3*   MAGNESIUM mg/dL  --   --   --  2.1 1.7  --  1.7 1.8   BILIRUBIN mg/dL  --   --   --   --   --  0.4  --   --    ALK PHOS U/L  --   --   --   --   --  127*  --   --    ALT (SGPT) U/L  --   --   --   --   --  21  --   --    AST (SGOT) U/L  --   --   --   --   --  17  --   --     < > = values in this interval not displayed.       Assessment & Plan   Assessment / Plan       Active Hospital Problems:    Active Hospital Problems    Diagnosis  POA   • Chronic kidney disease, stage IV (severe) [N18.4]  Unknown     2.5 g of proteinuria  Baseline creatinine 2.3       • 1. Incision and drainage of posterior perianal abscess 2. Sharp excisional debridement through skin and subcutaneous tissue in the posterior perianal area, 9 x 6 x 1 cm [Z98.890]  Not Applicable   • Perianal abscess [K61.0]  Unknown   • Wound of gluteal cleft [T00.738U]  Unknown   • Bronchiectasis with acute exacerbation [J47.1]  Unknown   • Pneumonia due to Pseudomonas species [J15.1]  Unknown   • History of anemia due to chronic kidney  disease [N18.9, Z86.2]  Not Applicable     Start Procrit     • Bronchiectasis without complication [J47.9]  Unknown   • COPD exacerbation [J44.1]  Unknown       Plan:   Decrease Bumex 1 mg twice daily  Start Procrit    Electronically signed by Nelsy Marx MD, 02/15/25, 11:13 AM EST.

## 2025-02-15 NOTE — PLAN OF CARE
"Goal Outcome Evaluation:  Plan of Care Reviewed With: patient        Progress: no change  Outcome Evaluation: A/Ox4, able to make needs known. Wound vac to suction on sacral wound. C/o pain to sacrum, pain regimen reviewed with patient. Pt declined wanting pain medication stating \"it makes me sleep all day\". Relief provided with repositioning and pillow support. Education reinforced on prevention of further skin breakdown, pt confirmed understanding with teach back method. Colostomy to LLQ with adequate brown liquid output. Blood glucose closely monitored, SSI in place. Doyle catheter patent, with yellow urine output. VSS                             "

## 2025-02-16 LAB
ANION GAP SERPL CALCULATED.3IONS-SCNC: 13.5 MMOL/L (ref 5–15)
BUN SERPL-MCNC: 67 MG/DL (ref 6–20)
BUN/CREAT SERPL: 23.8 (ref 7–25)
CALCIUM SPEC-SCNC: 8.4 MG/DL (ref 8.6–10.5)
CALCIUM SPEC-SCNC: 8.5 MG/DL (ref 8.6–10.5)
CHLORIDE SERPL-SCNC: 94 MMOL/L (ref 98–107)
CO2 SERPL-SCNC: 24.5 MMOL/L (ref 22–29)
CREAT SERPL-MCNC: 2.82 MG/DL (ref 0.76–1.27)
EGFRCR SERPLBLD CKD-EPI 2021: 25 ML/MIN/1.73
GLUCOSE BLDC GLUCOMTR-MCNC: 210 MG/DL (ref 70–99)
GLUCOSE BLDC GLUCOMTR-MCNC: 212 MG/DL (ref 70–99)
GLUCOSE BLDC GLUCOMTR-MCNC: 238 MG/DL (ref 70–99)
GLUCOSE BLDC GLUCOMTR-MCNC: 261 MG/DL (ref 70–99)
GLUCOSE SERPL-MCNC: 275 MG/DL (ref 65–99)
NT-PROBNP SERPL-MCNC: 1131 PG/ML (ref 0–900)
POTASSIUM SERPL-SCNC: 4 MMOL/L (ref 3.5–5.2)
SODIUM SERPL-SCNC: 132 MMOL/L (ref 136–145)

## 2025-02-16 PROCEDURE — 94664 DEMO&/EVAL PT USE INHALER: CPT

## 2025-02-16 PROCEDURE — 80048 BASIC METABOLIC PNL TOTAL CA: CPT | Performed by: INTERNAL MEDICINE

## 2025-02-16 PROCEDURE — 94799 UNLISTED PULMONARY SVC/PX: CPT

## 2025-02-16 PROCEDURE — 82948 REAGENT STRIP/BLOOD GLUCOSE: CPT

## 2025-02-16 PROCEDURE — 82948 REAGENT STRIP/BLOOD GLUCOSE: CPT | Performed by: STUDENT IN AN ORGANIZED HEALTH CARE EDUCATION/TRAINING PROGRAM

## 2025-02-16 PROCEDURE — 99233 SBSQ HOSP IP/OBS HIGH 50: CPT | Performed by: INTERNAL MEDICINE

## 2025-02-16 PROCEDURE — 63710000001 INSULIN GLARGINE PER 5 UNITS: Performed by: INTERNAL MEDICINE

## 2025-02-16 PROCEDURE — 83880 ASSAY OF NATRIURETIC PEPTIDE: CPT | Performed by: INTERNAL MEDICINE

## 2025-02-16 PROCEDURE — 63710000001 REVEFENACIN 175 MCG/3ML SOLUTION: Performed by: STUDENT IN AN ORGANIZED HEALTH CARE EDUCATION/TRAINING PROGRAM

## 2025-02-16 PROCEDURE — 97605 NEG PRS WND THER DME<=50SQCM: CPT

## 2025-02-16 PROCEDURE — 94761 N-INVAS EAR/PLS OXIMETRY MLT: CPT

## 2025-02-16 PROCEDURE — 63710000001 INSULIN LISPRO (HUMAN) PER 5 UNITS: Performed by: STUDENT IN AN ORGANIZED HEALTH CARE EDUCATION/TRAINING PROGRAM

## 2025-02-16 RX ORDER — ONDANSETRON 2 MG/ML
4 INJECTION INTRAMUSCULAR; INTRAVENOUS EVERY 4 HOURS PRN
Status: DISCONTINUED | OUTPATIENT
Start: 2025-02-16 | End: 2025-02-20 | Stop reason: HOSPADM

## 2025-02-16 RX ORDER — ACETAMINOPHEN 325 MG/1
650 TABLET ORAL EVERY 6 HOURS PRN
Status: DISCONTINUED | OUTPATIENT
Start: 2025-02-16 | End: 2025-02-20 | Stop reason: HOSPADM

## 2025-02-16 RX ORDER — HYDROXYZINE HYDROCHLORIDE 25 MG/1
25 TABLET, FILM COATED ORAL 3 TIMES DAILY PRN
Status: DISCONTINUED | OUTPATIENT
Start: 2025-02-16 | End: 2025-02-20 | Stop reason: HOSPADM

## 2025-02-16 RX ORDER — ALUMINA, MAGNESIA, AND SIMETHICONE 2400; 2400; 240 MG/30ML; MG/30ML; MG/30ML
15 SUSPENSION ORAL EVERY 6 HOURS PRN
Status: DISCONTINUED | OUTPATIENT
Start: 2025-02-16 | End: 2025-02-20 | Stop reason: HOSPADM

## 2025-02-16 RX ORDER — NICOTINE 21 MG/24HR
1 PATCH, TRANSDERMAL 24 HOURS TRANSDERMAL DAILY PRN
Status: DISCONTINUED | OUTPATIENT
Start: 2025-02-16 | End: 2025-02-20 | Stop reason: HOSPADM

## 2025-02-16 RX ORDER — LIDOCAINE 4 G/G
1 PATCH TOPICAL DAILY PRN
Status: DISCONTINUED | OUTPATIENT
Start: 2025-02-16 | End: 2025-02-20 | Stop reason: HOSPADM

## 2025-02-16 RX ADMIN — FINASTERIDE 5 MG: 5 TABLET, FILM COATED ORAL at 09:29

## 2025-02-16 RX ADMIN — ARFORMOTEROL TARTRATE 15 MCG: 15 SOLUTION RESPIRATORY (INHALATION) at 08:06

## 2025-02-16 RX ADMIN — ASPIRIN 81 MG: 81 TABLET, COATED ORAL at 07:26

## 2025-02-16 RX ADMIN — LOSARTAN POTASSIUM 50 MG: 50 TABLET, FILM COATED ORAL at 09:29

## 2025-02-16 RX ADMIN — TAMSULOSIN HYDROCHLORIDE 0.4 MG: 0.4 CAPSULE ORAL at 09:29

## 2025-02-16 RX ADMIN — BUSPIRONE HYDROCHLORIDE 15 MG: 5 TABLET ORAL at 21:03

## 2025-02-16 RX ADMIN — INSULIN LISPRO 4 UNITS: 100 INJECTION, SOLUTION INTRAVENOUS; SUBCUTANEOUS at 07:26

## 2025-02-16 RX ADMIN — REVEFENACIN 175 MCG: 175 SOLUTION RESPIRATORY (INHALATION) at 08:06

## 2025-02-16 RX ADMIN — BUSPIRONE HYDROCHLORIDE 15 MG: 5 TABLET ORAL at 09:29

## 2025-02-16 RX ADMIN — TOBRAMYCIN 300 MG: 300 SOLUTION RESPIRATORY (INHALATION) at 08:06

## 2025-02-16 RX ADMIN — Medication 10 ML: at 21:04

## 2025-02-16 RX ADMIN — BUDESONIDE 0.5 MG: 0.5 INHALANT RESPIRATORY (INHALATION) at 20:17

## 2025-02-16 RX ADMIN — CARVEDILOL 25 MG: 25 TABLET, FILM COATED ORAL at 21:03

## 2025-02-16 RX ADMIN — ATORVASTATIN CALCIUM 20 MG: 10 TABLET, FILM COATED ORAL at 09:29

## 2025-02-16 RX ADMIN — Medication 10 ML: at 09:29

## 2025-02-16 RX ADMIN — INSULIN LISPRO 6 UNITS: 100 INJECTION, SOLUTION INTRAVENOUS; SUBCUTANEOUS at 17:04

## 2025-02-16 RX ADMIN — GUAIFENESIN 600 MG: 600 TABLET ORAL at 21:03

## 2025-02-16 RX ADMIN — FERROUS SULFATE TAB 325 MG (65 MG ELEMENTAL FE) 325 MG: 325 (65 FE) TAB at 07:26

## 2025-02-16 RX ADMIN — BUMETANIDE 1 MG: 1 TABLET ORAL at 09:29

## 2025-02-16 RX ADMIN — INSULIN GLARGINE 5 UNITS: 100 INJECTION, SOLUTION SUBCUTANEOUS at 11:41

## 2025-02-16 RX ADMIN — INSULIN LISPRO 4 UNITS: 100 INJECTION, SOLUTION INTRAVENOUS; SUBCUTANEOUS at 11:41

## 2025-02-16 RX ADMIN — TOBRAMYCIN 300 MG: 300 SOLUTION RESPIRATORY (INHALATION) at 20:17

## 2025-02-16 RX ADMIN — ARFORMOTEROL TARTRATE 15 MCG: 15 SOLUTION RESPIRATORY (INHALATION) at 20:17

## 2025-02-16 RX ADMIN — BUDESONIDE 0.5 MG: 0.5 INHALANT RESPIRATORY (INHALATION) at 08:06

## 2025-02-16 RX ADMIN — INSULIN LISPRO 4 UNITS: 100 INJECTION, SOLUTION INTRAVENOUS; SUBCUTANEOUS at 21:02

## 2025-02-16 RX ADMIN — GUAIFENESIN 600 MG: 600 TABLET ORAL at 09:29

## 2025-02-16 RX ADMIN — CARVEDILOL 25 MG: 25 TABLET, FILM COATED ORAL at 09:29

## 2025-02-16 RX ADMIN — FAMOTIDINE 20 MG: 20 TABLET ORAL at 07:26

## 2025-02-16 RX ADMIN — COLLAGENASE SANTYL 1 APPLICATION: 250 OINTMENT TOPICAL at 09:30

## 2025-02-16 RX ADMIN — MONTELUKAST 10 MG: 10 TABLET, FILM COATED ORAL at 21:03

## 2025-02-16 NOTE — PROGRESS NOTES
[facility]     Progress Note    Patient Name: Preston Wallis  : 1965  MRN: [id number]  Primary Care Physician:  Kimmy Riley MD  Date of admission: 2025    Subjective patient fully awake alert responsive interactive not in any acute distress  He is doing really well.  Good urine output  Review of Systems  All review of systems are negative except as mentioned in subjective complaints.    Objective   Objective     Vitals:   Temp:  [97.8 °F (36.6 °C)-98.1 °F (36.7 °C)] 97.8 °F (36.6 °C)  Heart Rate:  [72-90] 76  Resp:  [16-18] 18  BP: (126-155)/(40-66) 155/66  Flow (L/min) (Oxygen Therapy):  [2] 2  Physical Exam    Constitutional: Awake, alert responsive, conversant, no obvious distress              Psychiatric:  Appropriate affect, cooperative   Neurologic:  Awake alert ,oriented x 3, strength symmetric in all extremities, Cranial Nerves grossly intact to confrontation, speech clear   Eyes:   PERRLA, sclerae anicteric, no conjunctival injection   HEENT:  Moist mucous membranes, no nasal or eye discharge, no throat congestion   Neck:   Supple, no thyromegaly, no lymphadenopathy, trachea midline, no elevated JVD   Respiratory:  Clear to auscultation bilaterally, nonlabored respirations    Cardiovascular: RRR, no murmurs, rubs, or gallops, palpable pedal pulses bilaterally, No bilateral ankle edema   Gastrointestinal: Positive bowel sounds, soft, nontender, nondistended, no organomegaly   Musculoskeletal:  No clubbing or cyanosis to extremities,muscle wasting, joint swelling, muscle weakness             Skin:                      No rashes, bruising, skin ulcers, petechiae or ecchymosis    Result Review    Result Review:  I have personally reviewed the results from the time of this admission to 2025 09:07 EST and agree with these findings:  []  Laboratory  []  Microbiology  []  Radiology  []  EKG/Telemetry   []  Cardiology/Vascular   []  Pathology  []  Old records  []  Other:    Results from  last 7 days   Lab Units 02/12/25  0410 02/11/25 0430 02/10/25  0411   WBC 10*3/mm3 14.04* 13.36* 13.84*   HEMOGLOBIN g/dL 9.4* 9.0* 9.1*   PLATELETS 10*3/mm3 325 279 229     Results from last 7 days   Lab Units 02/16/25  0343 02/15/25  0559 02/14/25  0543 02/13/25  0356 02/12/25  0410 02/11/25  0430 02/10/25  1014 02/10/25  0411   SODIUM mmol/L  --   --  137  --  136 139 138 138   POTASSIUM mmol/L  --   --  4.2  --  4.0 4.1 4.1 4.3   CHLORIDE mmol/L  --   --  99  --  96* 98 96* 98   CO2 mmol/L  --   --  29.5*  --  29.7* 29.5* 30.8* 30.6*   ANION GAP mmol/L  --   --  8.5  --  10.3 11.5 11.2 9.4   BUN mg/dL  --   --  58*  --  59* 60* 58* 63*   CREATININE mg/dL  --   --  2.62*  --  2.49* 2.54* 2.49* 2.70*   GLUCOSE mg/dL  --   --  135*  --  173* 137* 250* 181*   EGFR mL/min/1.73  --   --  27.3*  --  29.0* 28.3* 29.0* 26.3*   CALCIUM mg/dL 8.5*   < > 8.7   < > 8.7 8.7 8.5* 8.4*   MAGNESIUM mg/dL  --   --   --   --  2.1 1.7  --  1.7   BILIRUBIN mg/dL  --   --   --   --   --   --  0.4  --    ALK PHOS U/L  --   --   --   --   --   --  127*  --    ALT (SGPT) U/L  --   --   --   --   --   --  21  --    AST (SGOT) U/L  --   --   --   --   --   --  17  --     < > = values in this interval not displayed.       Assessment & Plan   Assessment / Plan       Active Hospital Problems:    Active Hospital Problems    Diagnosis  POA   • Chronic kidney disease, stage IV (severe) [N18.4]  Unknown     2.5 g of proteinuria  Baseline creatinine 2.3       • 1. Incision and drainage of posterior perianal abscess 2. Sharp excisional debridement through skin and subcutaneous tissue in the posterior perianal area, 9 x 6 x 1 cm [Z98.890]  Not Applicable   • Perianal abscess [K61.0]  Unknown   • Wound of gluteal cleft [S35.948P]  Unknown   • Bronchiectasis with acute exacerbation [J47.1]  Unknown   • Pneumonia due to Pseudomonas species [J15.1]  Unknown   • History of anemia due to chronic kidney disease [N18.9, Z86.2]  Not Applicable     Start  Procrit     • Bronchiectasis without complication [J47.9]  Unknown   • COPD exacerbation [J44.1]  Unknown       Plan:   Continue present care  Bumex decreased  Labs tomorrow morning    Electronically signed by Nelsy Marx MD, 02/16/25, 9:07 AM EST.

## 2025-02-16 NOTE — PLAN OF CARE
Goal Outcome Evaluation:  Plan of Care Reviewed With: patient        Progress: improving  Outcome Evaluation: AOx4. Patietn q2turn, encouraged to shift weight frequently. Wound vac in place, no complications. F/C patent with clear yellow urine. No complaints of pain this shift. 2L NC to maintain O2 saturation. No significant changes this shift.

## 2025-02-16 NOTE — PLAN OF CARE
Goal Outcome Evaluation:  Plan of Care Reviewed With: patient        Progress: improving  Outcome Evaluation: Wound vac and lopez remain in place. Patient states having mild lower abdominal pain however, no intervention needed. 2L NC continues. No acute changes overnight.

## 2025-02-16 NOTE — PROGRESS NOTES
[facility]   Hospitalist Progress Note    Date of admission: 1/23/2025  Patient Name: Preston Wallis  1965  Date: 2/16/2025      Subjective     Chief Complaint   Patient presents with   • Shortness of Breath       Interval Followup: No acute events overnight.  No nausea vomiting.  No abdominal pain.  Has not noticed any bleeding recently.  Has not had transfusion since the seventh.      Objective     Vitals:   Temp:  [97.8 °F (36.6 °C)-98.1 °F (36.7 °C)] 97.8 °F (36.6 °C)  Heart Rate:  [72-90] 76  Resp:  [16-18] 18  BP: (126-155)/(40-66) 155/66  Flow (L/min) (Oxygen Therapy):  [2] 2    Physical Exam  Awake, resting in bed appears older than stated age  Has mild end expiratory wheezing upper lung fields, on 2 L nasal cannula  RRR no lower extremity pitting edema  Abdomen soft has some tenderness around ostomy site with some dark brown stool, no bleeding appreciated  Doyle in place (at home states has had intermittent straight catheterization)  Negative pressure wound VAC for perianal wound in place    Result Review:  Vital signs, labs and recent relevant imaging reviewed.      CBC          2/10/2025    04:11 2/11/2025    04:30 2/12/2025    04:10   CBC   WBC 13.84  13.36  14.04    RBC 3.20  3.16  3.23    Hemoglobin 9.1  9.0  9.4    Hematocrit 28.6  28.2  29.2    MCV 89.4  89.2  90.4    MCH 28.4  28.5  29.1    MCHC 31.8  31.9  32.2    RDW 14.6  15.0  14.9    Platelets 229  279  325      CMP          2/14/2025    05:43 2/15/2025    05:59 2/16/2025    03:43   CMP   Glucose 135      BUN 58      Creatinine 2.62      EGFR 27.3      Sodium 137      Potassium 4.2      Chloride 99      Calcium 8.7  8.9  8.5    Albumin 2.5      BUN/Creatinine Ratio 22.1      Anion Gap 8.5          •  dextrose  •  dextrose  •  glucagon (human recombinant)  •  heparin  •  ipratropium-albuterol  •  nitroglycerin  •  ondansetron  •  polyethylene glycol  •  simethicone  •  sodium chloride  •  sodium chloride  •  sodium chloride  •  sodium  chloride  •  sodium chloride  •  sodium chloride    arformoterol, 15 mcg, Nebulization, BID - RT  aspirin, 81 mg, Oral, QAM  atorvastatin, 20 mg, Oral, Daily  budesonide, 0.5 mg, Nebulization, BID  bumetanide, 1 mg, Oral, BID  busPIRone, 15 mg, Oral, BID  carvedilol, 25 mg, Oral, Q12H  collagenase, 1 Application, Topical, Daily  famotidine, 20 mg, Oral, QAM  ferrous sulfate, 325 mg, Oral, Daily With Breakfast  finasteride, 5 mg, Oral, Daily  guaiFENesin, 600 mg, Oral, Q12H  insulin lispro, 2-9 Units, Subcutaneous, 4x Daily AC & at Bedtime  losartan, 50 mg, Oral, Q24H  montelukast, 10 mg, Oral, Nightly  revefenacin, 175 mcg, Nebulization, Daily - RT  sodium chloride, 10 mL, Intravenous, Q12H  sodium chloride, 10 mL, Intravenous, Q12H  tamsulosin, 0.4 mg, Oral, Daily  tobramycin PF, 300 mg, Nebulization, BID - RT        No radiology results for the last 7 days    Assessment / Plan     Summary: 59 y.o. History of MDR Pseudomonas pneumonia recently discharged January 21 on IV ertapenem, COPD, CHF, who presented with shortness of breath.  Initial CT imaging showing a new spiculated left lower lobe nodule, bronchiectasis and enlarging pericardial effusion.  Pulmonary and cardiology is consulted.  TTE only showed a small pericardial effusion, EF 49%, grade 1 diastolic dysfunction.  Wound care noted perianal tissue necrosis, CT abdomen was obtained showed a 4.5 x 1.8 x 3.5 posterior anal abscess, general surgery consulted and patient underwent I&D on 1/26.  Underwent bronchoscopy on 1/28 still showing MDR Pseudomonas treated with IV meropenem and patient continued with MOIZ nebs.  Perianal culture with Enterococcus VRE and MDR Citrobacter treated with linezolid and Zosyn.  To help with wound healing patient underwent laparoscopic diverting loop colostomy along with repair of chronically incarcerated 3 cm umbilical hernia on 2/6/2025.  Has required several transfusions during hospitalization as recently 3 units on 2/7/2025.      Assessment/Plan (clinically significant if listed here)  Acute on chronic hypoxemic respiratory failure  Acute on chronic COPD with exacerbation  Bronchiectasis with acute exacerbation  MDR Pseudomonas pneumonia  Perianal abscess with Enterococcus VRE and MDR Citrobacter  Status post diverting colostomy  Blood loss anemia requiring transfusion  Paroxysmal A-fib previously on Eliquis  Pericardial effusion  AOCD and iron deficiency anemia  COPD  CKD-3 b/l Creatinine approximately 2.2  HFpEF 49%, grade 1, follows with dr bishop  Hypertension  IDDM 2  Chronic anxiety  Morbid obesity  BPH, chronic intermittent catheterization use outpatient    Check/add on BMP, BNP, repeat labs.  Bumex dose decreased today per nephrology  Continue Brovana Pulmicort, nebulizers, montelukast, guaifenesin and respiratory hygiene  Continue MOIZ mejias has completed IV antibiotics for Pseudomonas  Continue Coreg, losartan monitor blood pressure renal function  Apixaban has been on hold since 2/6 when he had sanguinous output from perianal wound and had received 3 units PRBC at that time, confirm potential timing of resumption of AC with surgery  Has received several transfusions, received a dose of EPO on 2/15.  Last transfusion 2/7 received 3 units  Continue p.o. iron  Continue aspirin, statin monitor  Flomax, finasteride, Doyle catheter, has been an extended period, to help with wound healing?,  If will need chronically will discontinue BPH meds/Flomax/finasteride likely  Continue SSI monitor glucose, add glargine  Continue BuSpar  Continue famotidine  PT/OT  Check a.m. CBC, BMP, magnesium, phosphorus  Continue hospitalization at current level of care    Dispo: Patient wants to go home at time of discharge with home health only and does not want to go back to rehab, will need definitive plan with wound VAC, surgery and stability with treatments prior to discharge.      VTE Prophylaxis:  Mechanical VTE prophylaxis orders are  present.      Code Status (Patient has no pulse and is not breathing): CPR (Attempt to Resuscitate)  Medical Interventions (Patient has pulse or is breathing): Full Support    Greater than 50 minutes on this encounter including review of labs, imaging, documentation, orders, vitals, monitoring and adjusting treatment and orders as indicated, discussion with the patient, and documenting.

## 2025-02-17 PROBLEM — Z93.3 COLOSTOMY STATUS: Status: ACTIVE | Noted: 2025-02-17

## 2025-02-17 LAB
ALBUMIN SERPL-MCNC: 2.5 G/DL (ref 3.5–5.2)
ALBUMIN/GLOB SERPL: 0.6 G/DL
ALP SERPL-CCNC: 130 U/L (ref 39–117)
ALT SERPL W P-5'-P-CCNC: 16 U/L (ref 1–41)
ANION GAP SERPL CALCULATED.3IONS-SCNC: 10.3 MMOL/L (ref 5–15)
AST SERPL-CCNC: 23 U/L (ref 1–40)
BASOPHILS # BLD AUTO: 0.09 10*3/MM3 (ref 0–0.2)
BASOPHILS NFR BLD AUTO: 0.8 % (ref 0–1.5)
BILIRUB SERPL-MCNC: 0.2 MG/DL (ref 0–1.2)
BUN SERPL-MCNC: 64 MG/DL (ref 6–20)
BUN/CREAT SERPL: 24.3 (ref 7–25)
CALCIUM SPEC-SCNC: 9 MG/DL (ref 8.6–10.5)
CHLORIDE SERPL-SCNC: 97 MMOL/L (ref 98–107)
CO2 SERPL-SCNC: 26.7 MMOL/L (ref 22–29)
CREAT SERPL-MCNC: 2.63 MG/DL (ref 0.76–1.27)
DEPRECATED RDW RBC AUTO: 48 FL (ref 37–54)
EGFRCR SERPLBLD CKD-EPI 2021: 27.2 ML/MIN/1.73
EOSINOPHIL # BLD AUTO: 1.3 10*3/MM3 (ref 0–0.4)
EOSINOPHIL NFR BLD AUTO: 10.9 % (ref 0.3–6.2)
ERYTHROCYTE [DISTWIDTH] IN BLOOD BY AUTOMATED COUNT: 14.7 % (ref 12.3–15.4)
GLOBULIN UR ELPH-MCNC: 4 GM/DL
GLUCOSE BLDC GLUCOMTR-MCNC: 154 MG/DL (ref 70–99)
GLUCOSE BLDC GLUCOMTR-MCNC: 173 MG/DL (ref 70–99)
GLUCOSE BLDC GLUCOMTR-MCNC: 209 MG/DL (ref 70–99)
GLUCOSE BLDC GLUCOMTR-MCNC: 218 MG/DL (ref 70–99)
GLUCOSE SERPL-MCNC: 155 MG/DL (ref 65–99)
HCT VFR BLD AUTO: 25.9 % (ref 37.5–51)
HGB BLD-MCNC: 8.4 G/DL (ref 13–17.7)
IMM GRANULOCYTES # BLD AUTO: 0.17 10*3/MM3 (ref 0–0.05)
IMM GRANULOCYTES NFR BLD AUTO: 1.4 % (ref 0–0.5)
LYMPHOCYTES # BLD AUTO: 2.31 10*3/MM3 (ref 0.7–3.1)
LYMPHOCYTES NFR BLD AUTO: 19.3 % (ref 19.6–45.3)
MAGNESIUM SERPL-MCNC: 1.9 MG/DL (ref 1.6–2.6)
MCH RBC QN AUTO: 29.1 PG (ref 26.6–33)
MCHC RBC AUTO-ENTMCNC: 32.4 G/DL (ref 31.5–35.7)
MCV RBC AUTO: 89.6 FL (ref 79–97)
MONOCYTES # BLD AUTO: 1.49 10*3/MM3 (ref 0.1–0.9)
MONOCYTES NFR BLD AUTO: 12.4 % (ref 5–12)
NEUTROPHILS NFR BLD AUTO: 55.2 % (ref 42.7–76)
NEUTROPHILS NFR BLD AUTO: 6.62 10*3/MM3 (ref 1.7–7)
NRBC BLD AUTO-RTO: 0 /100 WBC (ref 0–0.2)
PHOSPHATE SERPL-MCNC: 2.6 MG/DL (ref 2.5–4.5)
PLATELET # BLD AUTO: 575 10*3/MM3 (ref 140–450)
PMV BLD AUTO: 8.8 FL (ref 6–12)
POTASSIUM SERPL-SCNC: 4 MMOL/L (ref 3.5–5.2)
PROT SERPL-MCNC: 6.5 G/DL (ref 6–8.5)
RBC # BLD AUTO: 2.89 10*6/MM3 (ref 4.14–5.8)
SODIUM SERPL-SCNC: 134 MMOL/L (ref 136–145)
WBC NRBC COR # BLD AUTO: 11.98 10*3/MM3 (ref 3.4–10.8)

## 2025-02-17 PROCEDURE — 82948 REAGENT STRIP/BLOOD GLUCOSE: CPT

## 2025-02-17 PROCEDURE — 99233 SBSQ HOSP IP/OBS HIGH 50: CPT | Performed by: INTERNAL MEDICINE

## 2025-02-17 PROCEDURE — 94799 UNLISTED PULMONARY SVC/PX: CPT

## 2025-02-17 PROCEDURE — 63710000001 INSULIN LISPRO (HUMAN) PER 5 UNITS: Performed by: STUDENT IN AN ORGANIZED HEALTH CARE EDUCATION/TRAINING PROGRAM

## 2025-02-17 PROCEDURE — 63710000001 REVEFENACIN 175 MCG/3ML SOLUTION: Performed by: STUDENT IN AN ORGANIZED HEALTH CARE EDUCATION/TRAINING PROGRAM

## 2025-02-17 PROCEDURE — 94761 N-INVAS EAR/PLS OXIMETRY MLT: CPT

## 2025-02-17 PROCEDURE — 63710000001 INSULIN GLARGINE PER 5 UNITS: Performed by: INTERNAL MEDICINE

## 2025-02-17 PROCEDURE — 82948 REAGENT STRIP/BLOOD GLUCOSE: CPT | Performed by: STUDENT IN AN ORGANIZED HEALTH CARE EDUCATION/TRAINING PROGRAM

## 2025-02-17 PROCEDURE — 84100 ASSAY OF PHOSPHORUS: CPT | Performed by: INTERNAL MEDICINE

## 2025-02-17 PROCEDURE — 83735 ASSAY OF MAGNESIUM: CPT | Performed by: INTERNAL MEDICINE

## 2025-02-17 PROCEDURE — 80053 COMPREHEN METABOLIC PANEL: CPT | Performed by: INTERNAL MEDICINE

## 2025-02-17 PROCEDURE — 94664 DEMO&/EVAL PT USE INHALER: CPT

## 2025-02-17 PROCEDURE — 85025 COMPLETE CBC W/AUTO DIFF WBC: CPT | Performed by: INTERNAL MEDICINE

## 2025-02-17 PROCEDURE — 99232 SBSQ HOSP IP/OBS MODERATE 35: CPT | Performed by: STUDENT IN AN ORGANIZED HEALTH CARE EDUCATION/TRAINING PROGRAM

## 2025-02-17 RX ORDER — BUMETANIDE 1 MG/1
1 TABLET ORAL
Status: DISCONTINUED | OUTPATIENT
Start: 2025-02-17 | End: 2025-02-18

## 2025-02-17 RX ORDER — ATORVASTATIN CALCIUM 20 MG/1
20 TABLET, FILM COATED ORAL NIGHTLY
Status: DISCONTINUED | OUTPATIENT
Start: 2025-02-18 | End: 2025-02-20 | Stop reason: HOSPADM

## 2025-02-17 RX ADMIN — Medication 10 ML: at 21:37

## 2025-02-17 RX ADMIN — BUMETANIDE 1 MG: 1 TABLET ORAL at 17:00

## 2025-02-17 RX ADMIN — GUAIFENESIN 600 MG: 600 TABLET ORAL at 08:16

## 2025-02-17 RX ADMIN — MONTELUKAST 10 MG: 10 TABLET, FILM COATED ORAL at 21:37

## 2025-02-17 RX ADMIN — INSULIN LISPRO 2 UNITS: 100 INJECTION, SOLUTION INTRAVENOUS; SUBCUTANEOUS at 08:15

## 2025-02-17 RX ADMIN — INSULIN LISPRO 2 UNITS: 100 INJECTION, SOLUTION INTRAVENOUS; SUBCUTANEOUS at 17:00

## 2025-02-17 RX ADMIN — ARFORMOTEROL TARTRATE 15 MCG: 15 SOLUTION RESPIRATORY (INHALATION) at 09:31

## 2025-02-17 RX ADMIN — BUMETANIDE 1 MG: 1 TABLET ORAL at 08:16

## 2025-02-17 RX ADMIN — INSULIN LISPRO 4 UNITS: 100 INJECTION, SOLUTION INTRAVENOUS; SUBCUTANEOUS at 21:36

## 2025-02-17 RX ADMIN — CARVEDILOL 25 MG: 25 TABLET, FILM COATED ORAL at 08:16

## 2025-02-17 RX ADMIN — ASPIRIN 81 MG: 81 TABLET, COATED ORAL at 08:16

## 2025-02-17 RX ADMIN — TOBRAMYCIN 300 MG: 300 SOLUTION RESPIRATORY (INHALATION) at 09:31

## 2025-02-17 RX ADMIN — Medication 10 ML: at 08:17

## 2025-02-17 RX ADMIN — FAMOTIDINE 20 MG: 20 TABLET ORAL at 08:16

## 2025-02-17 RX ADMIN — INSULIN LISPRO 4 UNITS: 100 INJECTION, SOLUTION INTRAVENOUS; SUBCUTANEOUS at 11:37

## 2025-02-17 RX ADMIN — LOSARTAN POTASSIUM 50 MG: 50 TABLET, FILM COATED ORAL at 08:16

## 2025-02-17 RX ADMIN — FERROUS SULFATE TAB 325 MG (65 MG ELEMENTAL FE) 325 MG: 325 (65 FE) TAB at 08:16

## 2025-02-17 RX ADMIN — BUSPIRONE HYDROCHLORIDE 15 MG: 5 TABLET ORAL at 08:16

## 2025-02-17 RX ADMIN — ATORVASTATIN CALCIUM 20 MG: 10 TABLET, FILM COATED ORAL at 08:16

## 2025-02-17 RX ADMIN — COLLAGENASE SANTYL 1 APPLICATION: 250 OINTMENT TOPICAL at 08:17

## 2025-02-17 RX ADMIN — INSULIN GLARGINE 5 UNITS: 100 INJECTION, SOLUTION SUBCUTANEOUS at 08:15

## 2025-02-17 RX ADMIN — BUSPIRONE HYDROCHLORIDE 15 MG: 5 TABLET ORAL at 21:37

## 2025-02-17 RX ADMIN — REVEFENACIN 175 MCG: 175 SOLUTION RESPIRATORY (INHALATION) at 09:31

## 2025-02-17 RX ADMIN — Medication 5 MG: at 21:49

## 2025-02-17 RX ADMIN — GUAIFENESIN 600 MG: 600 TABLET ORAL at 21:37

## 2025-02-17 RX ADMIN — BUDESONIDE 0.5 MG: 0.5 INHALANT RESPIRATORY (INHALATION) at 19:05

## 2025-02-17 RX ADMIN — ARFORMOTEROL TARTRATE 15 MCG: 15 SOLUTION RESPIRATORY (INHALATION) at 19:05

## 2025-02-17 RX ADMIN — TOBRAMYCIN 300 MG: 300 SOLUTION RESPIRATORY (INHALATION) at 19:07

## 2025-02-17 RX ADMIN — BUDESONIDE 0.5 MG: 0.5 INHALANT RESPIRATORY (INHALATION) at 09:31

## 2025-02-17 NOTE — PROGRESS NOTES
Pulmonary / Critical Care Progress Note      Patient Name: Preston Wallis  : 1965  MRN: [id number]  Primary Care Physician:  Kimmy Riley MD  Date of admission: 2025    Subjective   Subjective   Follow-up for acute on chronic respiratory failure hypoxic    No acute events overnight    This morning,  Remains on 2 L  Remains bedridden and fatigued  Denies any chest pain or chest tightness  Denies cough  Using Volera as needed  No fever or chills    Objective   Objective     Vitals:   Temp:  [97.2 °F (36.2 °C)-99.1 °F (37.3 °C)] 97.3 °F (36.3 °C)  Heart Rate:  [71-89] 77  Resp:  [16-18] 18  BP: (118-153)/(41-67) 141/51  Flow (L/min) (Oxygen Therapy):  [2] 2    Physical Exam   Vital Signs Reviewed   General: Chronically ill-appearing, obese male, Alert, NAD, resting in bed  Chest: Diminished bibasilarly to auscultation, no work of breathing noted on baseline 2 L NC  CV: RRR, no MGR, pulses 2+, equal.  Abd:  Soft, appropriately tender, ND, colostomy bag in place  EXT:  no clubbing, no cyanosis, no edema  Neuro:  A&Ox3, CN grossly intact, no focal deficits.  Skin: No rashes or lesions noted      Result Review    Result Review:  I have personally reviewed the results from the time of this admission to 2025 10:24 EST and agree with these findings:  [x]  Laboratory  [x]  Microbiology  [x]  Radiology  []  EKG/Telemetry   []  Cardiology/Vascular   []  Pathology  []  Old records  []  Other:  Most notable findings include:        Lab 25  0511 25  0343 02/15/25  0559 25  0543 25  0356 25  0410 25  0430   WBC 11.98*  --   --   --   --  14.04* 13.36*   HEMOGLOBIN 8.4*  --   --   --   --  9.4* 9.0*   HEMATOCRIT 25.9*  --   --   --   --  29.2* 28.2*   PLATELETS 575*  --   --   --   --  325 279   SODIUM 134* 132*  --  137  --  136 139   POTASSIUM 4.0 4.0  --  4.2  --  4.0 4.1   CHLORIDE 97* 94*  --  99  --  96* 98   CO2 26.7 24.5  --  29.5*  --  29.7* 29.5*   BUN 64* 67*  --   58*  --  59* 60*   CREATININE 2.63* 2.82*  --  2.62*  --  2.49* 2.54*   GLUCOSE 155* 275*  --  135*  --  173* 137*   CALCIUM 9.0 8.4*  8.5* 8.9 8.7 8.6 8.7 8.7   PHOSPHORUS 2.6  --   --  3.9  --  2.8 2.5   TOTAL PROTEIN 6.5  --   --   --   --   --   --    ALBUMIN 2.5*  --   --  2.5*  --   --   --    GLOBULIN 4.0  --   --   --   --   --   --      2/5 chest x-ray with by lateral upper lobe airspace disease consistent bronchiectasis and trace bilateral effusions  Sputum culture with multidrug-resistant Pseudomonas    Assessment & Plan   Assessment / Plan     Active Hospital Problems:  Active Hospital Problems    Diagnosis    • Colostomy status    • Chronic kidney disease, stage IV (severe)    • 1. Incision and drainage of posterior perianal abscess 2. Sharp excisional debridement through skin and subcutaneous tissue in the posterior perianal area, 9 x 6 x 1 cm    • Perianal abscess    • Wound of gluteal cleft    • Bronchiectasis with acute exacerbation    • Pneumonia due to Pseudomonas species    • History of anemia due to chronic kidney disease    • Bronchiectasis without complication    • COPD exacerbation      Impression:  Acute on chronic hypoxemic respiratory failure  Multidrug-resistant Pseudomonas pneumonia  Bronchiectasis with acute exacerbation  Acute exacerbation of COPD  Paroxysmal atrial fibrillation  Perianal abscess status post drainage  Status post diverting colostomy  Hypomagnesemia  Class II obesity with BMI 35.1    Plan:  Continue supplemental oxygen to keep SpO2 greater 90%.  Patient uses 2 L at baseline  Continue NIPPV nightly and with naps  Status post bronchoscopy 2/3 with airway clearance.  Positive for MDRO Pseudomonas infection  Completed 7 days of Zosyn  Continue MOIZ nebs 1 month on, 1 month off  Continue oral Bumex.  Continue to trend renal function electrolytes.  Encourage activity  Holding Eliquis due to anemia  May benefit from rehab    VTE Prophylaxis:  Mechanical VTE prophylaxis orders  are present.    CODE STATUS:   Code Status (Patient has no pulse and is not breathing): CPR (Attempt to Resuscitate)  Medical Interventions (Patient has pulse or is breathing): Full Support      Labs, imaging, microbiology, notes and medications personally reviewed  Discussed with primary    Electronically signed by CON Camilo, 02/17/25, 10:24 AM EST.  This patient was seen by both a physician and a NP. I, Deisi Nichole MD, spent >50% of time in accordance with split shared billing. This included personally reviewing all pertinent labs, imaging, microbiology and documentation. Also discussing the case with the patient and any available family, the admitting physician and any available ancillary staff.   Electronically signed by Deisi Nichole MD, 02/17/25, 3:33 PM EST.

## 2025-02-17 NOTE — SIGNIFICANT NOTE
Wound Eval / Progress Noted    YAIMA [place]ton     Patient Name: Preston Wallis  : 1965  MRN: [id number]  Today's Date: 2025                 Admit Date: 2025    Visit Dx:    ICD-10-CM ICD-9-CM   1. Pneumonia due to Pseudomonas species, unspecified laterality, unspecified part of lung  J15.1 482.1   2. Urinary tract infection associated with indwelling urethral catheter, initial encounter  T83.511A 996.64    N39.0 599.0   3. Perianal abscess  K61.0 566   4. COPD exacerbation  J44.1 491.21   5. Bronchiectasis without complication  J47.9 494.0   6. Allergy, initial encounter  T78.40XA 995.3   7. Acute hypoxic on chronic hypercapnic respiratory failure  J96.01 518.84    J96.12    8. Tobacco abuse, in remission  F17.201 305.1   9. Chronic dyspnea  R06.09 786.09   10. Obstructive sleep apnea  G47.33 327.23   11. Chronic respiratory failure with hypoxia and hypercapnia  J96.11 518.83    J96.12 799.02     786.09   12. Chronic obstructive pulmonary disease, unspecified COPD type  J44.9 496   13. Wound of gluteal cleft, unspecified laterality, subsequent encounter  S31.809D V58.89     877.0   14. Pneumonia of both lower lobes due to Pneumocystis jirovecii  B59 136.3   15. Bacterial pneumonia  J15.9 482.9   16. Bronchiectasis with acute exacerbation  J47.1 494.1   17. Pneumonia of both lungs due to Pseudomonas species, unspecified part of lung  J15.1 482.1   18. Difficulty walking  R26.2 719.7         COPD exacerbation    Bronchiectasis without complication    History of anemia due to chronic kidney disease    Pneumonia due to Pseudomonas species    Bronchiectasis with acute exacerbation    Perianal abscess    1. Incision and drainage of posterior perianal abscess 2. Sharp excisional debridement through skin and subcutaneous tissue in the posterior perianal area, 9 x 6 x 1 cm    Wound of gluteal cleft    Chronic kidney disease, stage IV (severe)    Colostomy status        Past Medical History:   Diagnosis Date     "1. Incision and drainage of posterior perianal abscess 2. Sharp excisional debridement through skin and subcutaneous tissue in the posterior perianal area, 9 x 6 x 1 cm 01/26/2025    Age-related cognitive decline     Allergic contact dermatitis     Allergies     Anemia     Bedbound     11/2023 \"MY LEG MUSCLES STOPPED WORKING\"    Bronchiectasis with acute lower respiratory infection     Charcot foot due to diabetes mellitus 09/10/2013    Chronic diastolic (congestive) heart failure     Chronic kidney disease     Chronic respiratory failure with hypoxia     Closed supracondylar fracture of femur 01/12/2022    COPD (chronic obstructive pulmonary disease)     Deep vein thrombosis (DVT) of lower extremity associated with air travel 01/13/2023    Dependence on supplemental oxygen     Eczema     Erectile dysfunction     due to organic reasons    Essential (primary) hypertension     Fracture     closed fracture of other tarsal and metatarsal bones    Fracture of proximal humerus 01/13/2023    GERD without esophagitis     High risk medication use     Hypercholesteremia     Hypomagnesemia     Infected stasis ulcer of left lower extremity 01/13/2023    Insomnia     Low back pain     Major depressive disorder     Morbid (severe) obesity due to excess calories     MRSA pneumonia     Muscle weakness     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 without evidence of necrosis     Obstructive sleep apnea (adult) (pediatric)     On home O2     REPORTS WEARING 2L/NC AAT    Other forms of dyspnea     Other long term (current) drug therapy     Other specified noninfective gastroenteritis and colitis     Other spondylosis, lumbar region     Pain in both knees     Paroxysmal atrial fibrillation     Peripheral neuropathy     attributed to type 2 diabetes    Pneumonia, unspecified organism     Polyneuropathy     Rash and other nonspecific skin eruption     Self-catheterizes urinary bladder     EVERY 4 " "HOURS    Smoking     \"SOMETIMES\"    Syncope and collapse     Tachycardia     Tinnitus 01/13/2023    Type 1 diabetes mellitus with diabetic chronic kidney disease     Type 2 diabetes mellitus     Unspecified fall, initial encounter     Urinary retention         Past Surgical History:   Procedure Laterality Date    BRONCHOSCOPY N/A 1/28/2025    Procedure: BRONCHOSCOPY: BAL: insertion of lighted instrument to view inside the lung;  Surgeon: Walter Nicole DO;  Location: [facility] MAIN OR;  Service: Pulmonary;  Laterality: N/A;    BRONCHOSCOPY N/A 2/3/2025    Procedure: BRONCHOSCOPY WITH BRONCHOALVEOLAR LAVAGE, POSSIBLE BIOPSY, BRUSHING, WASHING, AIRWAY INSPECTION: insertion of lighted instrument to view inside the lung;  Surgeon: Chadd Swan MD;  Location: [facility] MAIN OR;  Service: Pulmonary;  Laterality: N/A;    CARDIAC CATHETERIZATION Left 8/15/2024    Procedure: Carbon dioxide aortogram with left leg angiogram, possible angioplasty or stenting;  Surgeon: Moshe Willson MD;  Location: [facility] CATH INVASIVE LOCATION;  Service: Vascular;  Laterality: Left;    CHOLECYSTECTOMY      COLOSTOMY N/A 2/6/2025    Procedure: COLOSTOMY LAPAROSCOPIC; plain text: creation of colostomy using small incisions;  Surgeon: Emerson Canada MD;  Location: [facility] OR [facility];  Service: General;  Laterality: N/A;    CYSTOSCOPY      FEMUR SURGERY Left     Shravan placed    INCISION AND DRAINAGE ABSCESS N/A 1/26/2025    Procedure: INCISION AND DRAINAGE ABSCESS; plain text: incision and drain pus from buttocks wound;  Surgeon: Emerson Canada MD;  Location: [facility] OR [facility];  Service: General;  Laterality: N/A;    KNEE SURGERY Left     OTHER SURGICAL HISTORY Left     venous port, REMOVED    PORTACATH PLACEMENT Right     TIBIAL PLATEAU OPEN REDUCTION INTERNAL FIXATION Left 12/22/2023    Procedure: TIBIAL PLATEAU OPEN REDUCTION INTERNAL FIXATION;  Surgeon: Hugo Kline MD;  Location: [facility] OR;  Service: Orthopedics;  " Laterality: Left;    TONSILLECTOMY AND ADENOIDECTOMY               NPWT (Negative Pressure Wound Therapy) 02/14/25 1530 Francisca-anal (Active)   Therapy Setting continuous therapy 02/17/25 1300   Pressure Setting 125 mmHg 02/17/25 1300        Wound Check / Follow-up:  patient resting in bed  on right side, alert & oriented, agreeable to assessemnt. NPWT intact with foam compressed and seal check green on NPWT device.   Patient tolerating well and stated he avoids laying on area as much as possible. NPWT was changed on 2/16.         Laparoscopic diverting loop colostomy on 2/6/25. Stabalizing bar remains in place. Stoma has areas of necrosis and this was discussed with surgeon. 6-10oclock necrotic area does move with palpation, may slough off soon. Red moist tissue present in areas visible under discoloration across stoma. 12oclock necrotic area adhered but soft. Flatus noted with change and firm formed stool present to pouch when changed. No peristomal issues noted. Wife unable to be present today due to illness and babysitting grandkids due to weather related school day. She is planning to be here on Wednesday for ongoing education.      Stoma size today [id number]   Coloplast 1 piece flat applied.   Patient does not particpate in care even after stating he knows the steps to change. He expects wife to do all care.           Impression: NPWT to perianal wound intact, Diverting colostomy with necrotic appearing stoma but remains functioning, Patient does not participate in ostomy care. Wife continues to have issues with arriving for education.     Short term goals:  wound healing with NPWT, ongoing education for independent ostomy care with wife.       Ayanna Bruner RN    2/17/2025    14:19 EST

## 2025-02-17 NOTE — PROGRESS NOTES
[facility]     Progress Note    Patient Name: Preston Wallis  : 1965  MRN: [id number]  Primary Care Physician:  Kimmy Riley MD  Date of admission: 2025    Subjective patient resting comfortably without any new complaints    Review of Systems  All review of systems are negative except as mentioned in subjective complaints.    Objective   Objective     Vitals:   Temp:  [97.2 °F (36.2 °C)-99.1 °F (37.3 °C)] 97.3 °F (36.3 °C)  Heart Rate:  [71-89] 77  Resp:  [16-18] 18  BP: (118-153)/(41-67) 141/51  Flow (L/min) (Oxygen Therapy):  [2] 2  Physical Exam    Constitutional: Awake, alert responsive, conversant, no obvious distress              Psychiatric:  Appropriate affect, cooperative   Neurologic:  Awake alert ,oriented x 3, strength symmetric in all extremities, Cranial Nerves grossly intact to confrontation, speech clear   Eyes:   PERRLA, sclerae anicteric, no conjunctival injection   HEENT:  Moist mucous membranes, no nasal or eye discharge, no throat congestion   Neck:   Supple, no thyromegaly, no lymphadenopathy, trachea midline, no elevated JVD   Respiratory:  Clear to auscultation bilaterally, nonlabored respirations    Cardiovascular: RRR, no murmurs, rubs, or gallops, palpable pedal pulses bilaterally, No bilateral ankle edema   Gastrointestinal: Positive bowel sounds, soft, nontender, nondistended, no organomegaly   Musculoskeletal:  No clubbing or cyanosis to extremities,muscle wasting, joint swelling, muscle weakness             Skin:                      No rashes, bruising, skin ulcers, petechiae or ecchymosis    Result Review    Result Review:  I have personally reviewed the results from the time of this admission to 2025 09:46 EST and agree with these findings:  []  Laboratory  []  Microbiology  []  Radiology  []  EKG/Telemetry   []  Cardiology/Vascular   []  Pathology  []  Old records  []  Other:    Results from last 7 days   Lab Units 25  0511 25  8489  02/11/25  0430   WBC 10*3/mm3 11.98* 14.04* 13.36*   HEMOGLOBIN g/dL 8.4* 9.4* 9.0*   PLATELETS 10*3/mm3 575* 325 279     Results from last 7 days   Lab Units 02/17/25  0511 02/16/25  0343 02/15/25  0559 02/14/25  0543 02/13/25  0356 02/12/25  0410 02/11/25  0430 02/10/25  1014 02/10/25  1014   SODIUM mmol/L 134* 132*  --  137  --  136 139  --  138   POTASSIUM mmol/L 4.0 4.0  --  4.2  --  4.0 4.1  --  4.1   CHLORIDE mmol/L 97* 94*  --  99  --  96* 98  --  96*   CO2 mmol/L 26.7 24.5  --  29.5*  --  29.7* 29.5*  --  30.8*   ANION GAP mmol/L 10.3 13.5  --  8.5  --  10.3 11.5  --  11.2   BUN mg/dL 64* 67*  --  58*  --  59* 60*  --  58*   CREATININE mg/dL 2.63* 2.82*  --  2.62*  --  2.49* 2.54*  --  2.49*   GLUCOSE mg/dL 155* 275*  --  135*  --  173* 137*  --  250*   EGFR mL/min/1.73 27.2* 25.0*  --  27.3*  --  29.0* 28.3*  --  29.0*   CALCIUM mg/dL 9.0 8.4*  8.5*   < > 8.7   < > 8.7 8.7  --  8.5*   MAGNESIUM mg/dL 1.9  --   --   --   --  2.1 1.7   < >  --    BILIRUBIN mg/dL 0.2  --   --   --   --   --   --   --  0.4   ALK PHOS U/L 130*  --   --   --   --   --   --   --  127*   ALT (SGPT) U/L 16  --   --   --   --   --   --   --  21   AST (SGOT) U/L 23  --   --   --   --   --   --   --  17    < > = values in this interval not displayed.       Assessment & Plan   Assessment / Plan       Active Hospital Problems:    Active Hospital Problems    Diagnosis  POA   • Colostomy status [Z93.3]  Not Applicable   • Chronic kidney disease, stage IV (severe) [N18.4]  Unknown     2.5 g of proteinuria  Baseline creatinine 2.3       • 1. Incision and drainage of posterior perianal abscess 2. Sharp excisional debridement through skin and subcutaneous tissue in the posterior perianal area, 9 x 6 x 1 cm [Z98.890]  Not Applicable   • Perianal abscess [K61.0]  Unknown   • Wound of gluteal cleft [E19.478W]  Unknown   • Bronchiectasis with acute exacerbation [J47.1]  Unknown   • Pneumonia due to Pseudomonas species [J15.1]  Unknown   •  History of anemia due to chronic kidney disease [N18.9, Z86.2]  Not Applicable     Start Procrit     • Bronchiectasis without complication [J47.9]  Unknown   • COPD exacerbation [J44.1]  Unknown       Plan:   Continue diuresis  Renal function overall stable  Will monitor renal function closely    Electronically signed by Nelsy Marx MD, 02/17/25, 9:44 AM EST.

## 2025-02-17 NOTE — PROGRESS NOTES
[facility]   Hospitalist Progress Note    Date of admission: 1/23/2025  Patient Name: Preston Wallis  1965  Date: 2/17/2025      Subjective     Chief Complaint   Patient presents with   • Shortness of Breath       Interval Followup: No bleeding.  Has some mild tenderness near her ostomy but no worse than it has been the past few days.  Shortness of air about the same, encourage spirometry and respiratory hygiene use.  Patient declined to do MetaNebs today with respiratory.  Has wound VAC      Objective     Vitals:   Temp:  [96.3 °F (35.7 °C)-99.1 °F (37.3 °C)] 98.3 °F (36.8 °C)  Heart Rate:  [75-89] 78  Resp:  [16-18] 16  BP: (118-153)/(41-64) 141/48  Flow (L/min) (Oxygen Therapy):  [2] 2    Physical Exam  Awake, resting in bed appears older than stated age  Similar mild end expiratory wheezing upper lung fields, mild rhonchi, on 2 L nasal cannula  RRR no lower extremity pitting edema  Abdomen soft has some tenderness around ostomy site, ostomy pink, brown stool  Doyle in place (at home states has had intermittent straight catheterization)  Negative pressure wound VAC for perianal wound in place    Result Review:  Vital signs, labs and recent relevant imaging reviewed.      CBC          2/11/2025    04:30 2/12/2025    04:10 2/17/2025    05:11   CBC   WBC 13.36  14.04  11.98    RBC 3.16  3.23  2.89    Hemoglobin 9.0  9.4  8.4    Hematocrit 28.2  29.2  25.9    MCV 89.2  90.4  89.6    MCH 28.5  29.1  29.1    MCHC 31.9  32.2  32.4    RDW 15.0  14.9  14.7    Platelets 279  325  575      CMP          2/15/2025    05:59 2/16/2025    03:43 2/17/2025    05:11   CMP   Glucose  275  155    BUN  67  64    Creatinine  2.82  2.63    EGFR  25.0  27.2    Sodium  132  134    Potassium  4.0  4.0    Chloride  94  97    Calcium 8.9  8.5     8.4  9.0    Total Protein   6.5    Albumin   2.5    Globulin   4.0    Total Bilirubin   0.2    Alkaline Phosphatase   130    AST (SGOT)   23    ALT (SGPT)   16    Albumin/Globulin Ratio    0.6    BUN/Creatinine Ratio  23.8  24.3    Anion Gap  13.5  10.3        •  acetaminophen  •  aluminum-magnesium hydroxide-simethicone  •  collagenase  •  dextrose  •  dextrose  •  Diclofenac Sodium  •  glucagon (human recombinant)  •  heparin  •  hydrOXYzine  •  ipratropium-albuterol  •  Lidocaine  •  melatonin  •  nicotine  •  nitroglycerin  •  ondansetron  •  polyethylene glycol  •  simethicone  •  sodium chloride  •  sodium chloride  •  sodium chloride    arformoterol, 15 mcg, Nebulization, BID - RT  aspirin, 81 mg, Oral, QAM  [START ON 2/18/2025] atorvastatin, 20 mg, Oral, Nightly  budesonide, 0.5 mg, Nebulization, BID  bumetanide, 1 mg, Oral, BID Diuretics  busPIRone, 15 mg, Oral, BID  carvedilol, 25 mg, Oral, Q12H  collagenase, 1 Application, Topical, Daily  famotidine, 20 mg, Oral, QAM  ferrous sulfate, 325 mg, Oral, Daily With Breakfast  [Held by provider] finasteride, 5 mg, Oral, Daily  guaiFENesin, 600 mg, Oral, Q12H  insulin glargine, 5 Units, Subcutaneous, Daily  insulin lispro, 2-9 Units, Subcutaneous, 4x Daily AC & at Bedtime  losartan, 50 mg, Oral, Q24H  montelukast, 10 mg, Oral, Nightly  revefenacin, 175 mcg, Nebulization, Daily - RT  sodium chloride, 10 mL, Intravenous, Q12H  sodium chloride, 10 mL, Intravenous, Q12H  [Held by provider] tamsulosin, 0.4 mg, Oral, Daily  tobramycin PF, 300 mg, Nebulization, BID - RT        No radiology results for the last 7 days    Assessment / Plan     Summary: 59 y.o. History of MDR Pseudomonas pneumonia recently discharged January 21 on IV ertapenem, COPD, CHF, who presented with shortness of breath.  Initial CT imaging showing a new spiculated left lower lobe nodule, bronchiectasis and enlarging pericardial effusion.  Pulmonary and cardiology is consulted.  TTE only showed a small pericardial effusion, EF 49%, grade 1 diastolic dysfunction.  Wound care noted perianal tissue necrosis, CT abdomen was obtained showed a 4.5 x 1.8 x 3.5 posterior anal abscess, general  surgery consulted and patient underwent I&D on 1/26.  Underwent bronchoscopy on 1/28 still showing MDR Pseudomonas treated with IV meropenem and patient continued with MOIZ nebs.  Perianal culture with Enterococcus VRE and MDR Citrobacter treated with linezolid and Zosyn.  To help with wound healing patient underwent laparoscopic diverting loop colostomy along with repair of chronically incarcerated 3 cm umbilical hernia on 2/6/2025.  Has required several transfusions during hospitalization as recently 3 units on 2/7/2025.  Surgery suspect bleeding was from his perianal abscess.    Assessment/Plan (clinically significant if listed here)  Acute on chronic hypoxemic respiratory failure  Acute on chronic COPD with exacerbation  Bronchiectasis with acute exacerbation  MDR Pseudomonas pneumonia  Perianal abscess with Enterococcus VRE and MDR Citrobacter  Status post diverting colostomy  Blood loss anemia requiring transfusion  Paroxysmal A-fib previously on Eliquis  Pericardial effusion  AOCD and iron deficiency anemia  COPD  CKD-3 b/l Creatinine approximately 2.2  HFpEF 49%, grade 1, follows with dr bishop  Hypertension  IDDM 2  Chronic anxiety  Morbid obesity  BPH, chronic intermittent catheterization use outpatient    Discussed with surgery, suspect bleeding was from his perianal wound, would be okay to start Eliquis again from his end.   Continue on aspirin today, hemoglobin did drop on follow-up labs today compared to previous, repeat tomorrow remained stable could probably resume at that time  Plan to change wound VAC today to see if safe to go with this vs gauze/dressing changes per surgery, appreciate perioperative wound/wound vac monitoring  Creatinine slight improvement at 2.6 today, continue with Bumex, losartan as per nephrology  Continue Brovana Pulmicort, nebulizers, montelukast, guaifenesin and respiratory hygiene  Continue MOIZ moisess has completed IV antibiotics for Pseudomonas. Apprec pulm assistance  Plan  for 30 days on 30 days off, discussed with clinical pharmacist, would require prior authorization for MOIZ mejias, has been in the hospital long enough at this point that is close to finishing his initial 30-day treatment depending on timing of discharge may need a course outpatient at discharge versus follow-up with pulmonology for new prescription after his 30-day off.  Continue Coreg, losartan monitor blood pressure renal function.  Timing of Eliquis resumption possibly tomorrow  Has received several transfusions, received a dose of EPO on 2/15.  Last transfusion 2/7 received 3 units  Continue p.o. iron  Continue aspirin, statin monitor  Holding Flomax and finasteride as patient with chronic Doyle catheter in currently, outpatient does chronic intermittent catheterization but leaving continuous Doyle for now to help with perianal wound healing.  Given chronic catheterization need to continue these medications at discharge   Continue SSI monitor glucose, glargine, titrate  Continue BuSpar  Continue famotidine  PT/OT  Check a.m. CBC, BMP, magnesium, phosphorus  Continue hospitalization at current level of care    Dispo: Patient wants to go home at time of discharge with home health only and does not want to go back to rehab, will need definitive plan with wound VAC, surgery and stability with treatments prior to discharge.      VTE Prophylaxis:  Mechanical VTE prophylaxis orders are present.      Code Status (Patient has no pulse and is not breathing): CPR (Attempt to Resuscitate)  Medical Interventions (Patient has pulse or is breathing): Full Support    Greater than 50 minutes on this encounter including review of labs, imaging, documentation, orders, vitals, monitoring and adjusting treatment and orders as indicated, discussion with the patient, surgery, clinical pharmacist, sw, and documenting.

## 2025-02-17 NOTE — SIGNIFICANT NOTE
Wound Eval / Progress Noted    YAMIA Stockton     Patient Name: Preston Wallis  : 1965  MRN: [id number]  Today's Date: 2025                 Admit Date: 2025    Visit Dx:    ICD-10-CM ICD-9-CM   1. Pneumonia due to Pseudomonas species, unspecified laterality, unspecified part of lung  J15.1 482.1   2. Urinary tract infection associated with indwelling urethral catheter, initial encounter  T83.511A 996.64    N39.0 599.0   3. Perianal abscess  K61.0 566   4. COPD exacerbation  J44.1 491.21   5. Bronchiectasis without complication  J47.9 494.0   6. Allergy, initial encounter  T78.40XA 995.3   7. Acute hypoxic on chronic hypercapnic respiratory failure  J96.01 518.84    J96.12    8. Tobacco abuse, in remission  F17.201 305.1   9. Chronic dyspnea  R06.09 786.09   10. Obstructive sleep apnea  G47.33 327.23   11. Chronic respiratory failure with hypoxia and hypercapnia  J96.11 518.83    J96.12 799.02     786.09   12. Chronic obstructive pulmonary disease, unspecified COPD type  J44.9 496   13. Wound of gluteal cleft, unspecified laterality, subsequent encounter  S31.809D V58.89     877.0   14. Pneumonia of both lower lobes due to Pneumocystis jirovecii  B59 136.3   15. Bacterial pneumonia  J15.9 482.9   16. Bronchiectasis with acute exacerbation  J47.1 494.1   17. Pneumonia of both lungs due to Pseudomonas species, unspecified part of lung  J15.1 482.1   18. Difficulty walking  R26.2 719.7         COPD exacerbation    Bronchiectasis without complication    History of anemia due to chronic kidney disease    Pneumonia due to Pseudomonas species    Bronchiectasis with acute exacerbation    Perianal abscess    1. Incision and drainage of posterior perianal abscess 2. Sharp excisional debridement through skin and subcutaneous tissue in the posterior perianal area, 9 x 6 x 1 cm    Wound of gluteal cleft    Chronic kidney disease, stage IV (severe)        Past Medical History:   Diagnosis Date    1. Incision and  "drainage of posterior perianal abscess 2. Sharp excisional debridement through skin and subcutaneous tissue in the posterior perianal area, 9 x 6 x 1 cm 01/26/2025    Age-related cognitive decline     Allergic contact dermatitis     Allergies     Anemia     Bedbound     11/2023 \"MY LEG MUSCLES STOPPED WORKING\"    Bronchiectasis with acute lower respiratory infection     Charcot foot due to diabetes mellitus 09/10/2013    Chronic diastolic (congestive) heart failure     Chronic kidney disease     Chronic respiratory failure with hypoxia     Closed supracondylar fracture of femur 01/12/2022    COPD (chronic obstructive pulmonary disease)     Deep vein thrombosis (DVT) of lower extremity associated with air travel 01/13/2023    Dependence on supplemental oxygen     Eczema     Erectile dysfunction     due to organic reasons    Essential (primary) hypertension     Fracture     closed fracture of other tarsal and metatarsal bones    Fracture of proximal humerus 01/13/2023    GERD without esophagitis     High risk medication use     Hypercholesteremia     Hypomagnesemia     Infected stasis ulcer of left lower extremity 01/13/2023    Insomnia     Low back pain     Major depressive disorder     Morbid (severe) obesity due to excess calories     MRSA pneumonia     Muscle weakness     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 without evidence of necrosis     Obstructive sleep apnea (adult) (pediatric)     On home O2     REPORTS WEARING 2L/NC AAT    Other forms of dyspnea     Other long term (current) drug therapy     Other specified noninfective gastroenteritis and colitis     Other spondylosis, lumbar region     Pain in both knees     Paroxysmal atrial fibrillation     Peripheral neuropathy     attributed to type 2 diabetes    Pneumonia, unspecified organism     Polyneuropathy     Rash and other nonspecific skin eruption     Self-catheterizes urinary bladder     EVERY 4 HOURS    " "Smoking     \"SOMETIMES\"    Syncope and collapse     Tachycardia     Tinnitus 01/13/2023    Type 1 diabetes mellitus with diabetic chronic kidney disease     Type 2 diabetes mellitus     Unspecified fall, initial encounter     Urinary retention         Past Surgical History:   Procedure Laterality Date    BRONCHOSCOPY N/A 1/28/2025    Procedure: BRONCHOSCOPY: BAL: insertion of lighted instrument to view inside the lung;  Surgeon: Walter Nicole DO;  Location: [facility] MAIN OR;  Service: Pulmonary;  Laterality: N/A;    BRONCHOSCOPY N/A 2/3/2025    Procedure: BRONCHOSCOPY WITH BRONCHOALVEOLAR LAVAGE, POSSIBLE BIOPSY, BRUSHING, WASHING, AIRWAY INSPECTION: insertion of lighted instrument to view inside the lung;  Surgeon: Chadd Swan MD;  Location: [facility] MAIN OR;  Service: Pulmonary;  Laterality: N/A;    CARDIAC CATHETERIZATION Left 8/15/2024    Procedure: Carbon dioxide aortogram with left leg angiogram, possible angioplasty or stenting;  Surgeon: Moshe Willson MD;  Location: [facility] CATH INVASIVE LOCATION;  Service: Vascular;  Laterality: Left;    CHOLECYSTECTOMY      COLOSTOMY N/A 2/6/2025    Procedure: COLOSTOMY LAPAROSCOPIC; plain text: creation of colostomy using small incisions;  Surgeon: Emerson Canada MD;  Location: [facility] [place] [facility];  Service: General;  Laterality: N/A;    CYSTOSCOPY      FEMUR SURGERY Left     Shravan placed    INCISION AND DRAINAGE ABSCESS N/A 1/26/2025    Procedure: INCISION AND DRAINAGE ABSCESS; plain text: incision and drain pus from buttocks wound;  Surgeon: Emerson Canada MD;  Location: [facility] OR [facility];  Service: General;  Laterality: N/A;    KNEE SURGERY Left     OTHER SURGICAL HISTORY Left     venous port, REMOVED    PORTACATH PLACEMENT Right     TIBIAL PLATEAU OPEN REDUCTION INTERNAL FIXATION Left 12/22/2023    Procedure: TIBIAL PLATEAU OPEN REDUCTION INTERNAL FIXATION;  Surgeon: Hugo Kline MD;  Location: [facility] OR;  Service: Orthopedics;  Laterality: " "Left;    TONSILLECTOMY AND ADENOIDECTOMY           Physical Assessment:  Wound 01/23/25 2330 coccyx pressure injury (Active)   Wound Image   02/16/25 1525   Pressure Injury Stage 3 02/16/25 1525   Dressing Appearance dry;intact 02/16/25 1525   Closure None 02/16/25 1525   Base moist;red;yellow;slough 02/16/25 1525   Red (%), Wound Tissue Color 50 02/16/25 1525   Yellow (%), Wound Tissue Color 50 02/16/25 1525   Periwound dry;pink 02/16/25 1525   Periwound Temperature warm 02/16/25 1525   Periwound Skin Turgor soft 02/16/25 1525   Edges open 02/16/25 1525   Wound Length (cm) 7.3 cm 02/16/25 1525   Wound Width (cm) 6 cm 02/16/25 1525   Wound Depth (cm) 1.9 cm 02/16/25 1525   Wound Surface Area (cm^2) 43.8 cm^2 02/16/25 1525   Wound Volume (cm^3) 83.22 cm^3 02/16/25 1525   Drainage Characteristics/Odor serosanguineous 02/16/25 1525   Drainage Amount scant 02/16/25 1525   Care, Wound cleansed with;irrigated with;sterile normal saline;negative pressure wound therapy;collagenase agent applied 02/16/25 1525   Dressing Care dressing removed;dressing applied;foam;transparent film;hydrocolloid 02/16/25 1525   Periwound Care dry periwound area maintained 02/16/25 1525       NPWT (Negative Pressure Wound Therapy) 02/14/25 1530 Francisca-anal (Active)   Therapy Setting continuous therapy 02/16/25 1525   Dressing foam, white;foam, black 02/16/25 1525   Pressure Setting 125 mmHg 02/16/25 1525   Sponges Inserted 2;other (see comments) 02/16/25 1525   Sponges Removed 2;other (see comments) 02/16/25 1525   Finger sweep complete Yes 02/16/25 1525      02/16/25 1525   Colostomy   Placement date: If unknown, DO NOT use \"Add Comment\" note/Placement time: If unknown, DO NOT use \"Add Comment\" note: 02/06/25 1322   Hand Hygiene Completed: Yes   Stomal Appliance 1 piece;Clean;Dry;Intact;Drainable   Stoma Appearance round;dusky;moist;bridge in place;protruding above skin level   Peristomal Assessment AMINA   Stoma Function flatus;stool   Stool Color " brown   Stool Consistency soft   Treatment Placement checked   Output (mL) 100 mL     Wound Check / Follow-up:  Patient seen today for ostomy education and wound VAC dressing change.  Patient is awake, alert, and oriented. Patient underwent an incision and drainage of perianal abscess on 1/26/2025. Patient underwent diverting laprascopic colostomy placement on 2/6/25 to assist with healing of his perianal abscess. Reviewed ostomy education with patient to include managing ostomy, diet management with ostomy, and when to perform a pouch change; questions encouraged and answered.      Loop colostomy noted to lower aspect of abdomen. Pouching system is intact with no signs of lifting or leaking. 100ml of soft brown stool emptied from pouching system. Stoma is noted through pouching system to be round, moist, dusky, and protruding above skin level, with an ostomy bar in place.  Unable to visualize peristomal tissue.  Recommending to continue diligent ostomy care.       Stage III pressure injury noted to the perianal region post incision and drainage. Wound VAC is in place at time of assessment with no leaks or alarms noted. Wound VAC dressing removed with adhesive remover spray. One piece of black foam and one piece of white foam removed from wound base. One piece of back foam utilized for bridge also removed, for a total of 3 pieces of foam. Wound base presents with moist red tissue and yellow slough, with an area of dark red coagulated blood at 5 o'clock. The area of coagulated blood is noted to be loosening, with no signs of active bleeding.  Periwound tissue is dry with blanchable pink/redness. Cleansed and irrigated with normal saline and gauze, blotted dry. Skin prep applied to periwound tissue. Periwound tissue was draped with transparent film. Strips of hydrocolloid dressing were applied to wound edges from 5 to 6 o'clock and 6 to 7 o'clock, and a piece of Vanita's ring was applied at 6 o'clock to assist with  seal. A thin layer of Santyl was applied over wound base. One piece of white foam was placed within wound base near 6 o'clock. Remaining wound base was then lightly filled with one piece of black foam. Foams were draped with transparent film. A bridge was made with a second piece of black foam and draped with transparent film. Dressing was connected to wound VAC suction at 125 mmHg.  Seal obtained with no signs of lifting or leaking. Recommending to continue wound VAC therapy with dressing changes three times a week. Recommending quality skin care and hygiene to skin surrounding wound VAC dressing with application of blue top moisture barrier 4 times a day and as needed for incontinence.  Implement every 2 hour turns and offload at all times.  Keep patient clean, dry, and free from all moisture.       Impression: Surgical incision with delayed closure/Stage III pressure injury to perianal region with initiation of wound VAC therapy. Loop Colostomy.     Short term goals: Regain skin integrity, skin protection, moisture prevention, pressure reduction, quality skin care and hygiene, negative pressure wound VAC therapy, colostomy management.    Amparo oNvoa RN    2/16/2025    21:42 EST      03-Aug-2021 16:33

## 2025-02-17 NOTE — PLAN OF CARE
Goal Outcome Evaluation:  Plan of Care Reviewed With: patient        Progress: improving  Outcome Evaluation: AOx4. Patient wound dressing changed per orders. Wound vac in place, no complications. DBP varies, manual BP taken before medications. Other VSS. 2L NC to maintain O2. Colostomy functional with dark red-brown output. ACHS, insulin administered per MAR. F/C patent with clear yellow urine.

## 2025-02-17 NOTE — PLAN OF CARE
Goal Outcome Evaluation:  Plan of Care Reviewed With: patient        Progress: no change  Outcome Evaluation: DBP soft. MD notified. Held bumex but gave Coreg per MD order. Other VSS. On 2L NC. No complaint of SOA. Doyle draininage yellow urine with sediment. Colostomy with dark red-brown loose output. Wound vac intact. BGL monitored. Insulin given per sliding scale.

## 2025-02-18 LAB
ANION GAP SERPL CALCULATED.3IONS-SCNC: 9.2 MMOL/L (ref 5–15)
BASOPHILS # BLD AUTO: 0.1 10*3/MM3 (ref 0–0.2)
BASOPHILS NFR BLD AUTO: 0.9 % (ref 0–1.5)
BUN SERPL-MCNC: 68 MG/DL (ref 6–20)
BUN/CREAT SERPL: 24.1 (ref 7–25)
CALCIUM SPEC-SCNC: 9.2 MG/DL (ref 8.6–10.5)
CHLORIDE SERPL-SCNC: 97 MMOL/L (ref 98–107)
CO2 SERPL-SCNC: 26.8 MMOL/L (ref 22–29)
CREAT SERPL-MCNC: 2.82 MG/DL (ref 0.76–1.27)
DEPRECATED RDW RBC AUTO: 48.5 FL (ref 37–54)
EGFRCR SERPLBLD CKD-EPI 2021: 25 ML/MIN/1.73
EOSINOPHIL # BLD AUTO: 1.32 10*3/MM3 (ref 0–0.4)
EOSINOPHIL NFR BLD AUTO: 11.8 % (ref 0.3–6.2)
ERYTHROCYTE [DISTWIDTH] IN BLOOD BY AUTOMATED COUNT: 15.1 % (ref 12.3–15.4)
GLUCOSE BLDC GLUCOMTR-MCNC: 168 MG/DL (ref 70–99)
GLUCOSE BLDC GLUCOMTR-MCNC: 171 MG/DL (ref 70–99)
GLUCOSE BLDC GLUCOMTR-MCNC: 225 MG/DL (ref 70–99)
GLUCOSE BLDC GLUCOMTR-MCNC: 260 MG/DL (ref 70–99)
GLUCOSE SERPL-MCNC: 184 MG/DL (ref 65–99)
HCT VFR BLD AUTO: 26 % (ref 37.5–51)
HGB BLD-MCNC: 8.2 G/DL (ref 13–17.7)
IMM GRANULOCYTES # BLD AUTO: 0.11 10*3/MM3 (ref 0–0.05)
IMM GRANULOCYTES NFR BLD AUTO: 1 % (ref 0–0.5)
LYMPHOCYTES # BLD AUTO: 2 10*3/MM3 (ref 0.7–3.1)
LYMPHOCYTES NFR BLD AUTO: 17.9 % (ref 19.6–45.3)
MAGNESIUM SERPL-MCNC: 1.8 MG/DL (ref 1.6–2.6)
MCH RBC QN AUTO: 28.3 PG (ref 26.6–33)
MCHC RBC AUTO-ENTMCNC: 31.5 G/DL (ref 31.5–35.7)
MCV RBC AUTO: 89.7 FL (ref 79–97)
MONOCYTES # BLD AUTO: 1.56 10*3/MM3 (ref 0.1–0.9)
MONOCYTES NFR BLD AUTO: 14 % (ref 5–12)
MYCOBACTERIUM SPEC CULT: NORMAL
NEUTROPHILS NFR BLD AUTO: 54.4 % (ref 42.7–76)
NEUTROPHILS NFR BLD AUTO: 6.07 10*3/MM3 (ref 1.7–7)
NIGHT BLUE STAIN TISS: NORMAL
NIGHT BLUE STAIN TISS: NORMAL
NRBC BLD AUTO-RTO: 0.4 /100 WBC (ref 0–0.2)
PHOSPHATE SERPL-MCNC: 2.9 MG/DL (ref 2.5–4.5)
PLATELET # BLD AUTO: 653 10*3/MM3 (ref 140–450)
PMV BLD AUTO: 8.8 FL (ref 6–12)
POTASSIUM SERPL-SCNC: 3.7 MMOL/L (ref 3.5–5.2)
RBC # BLD AUTO: 2.9 10*6/MM3 (ref 4.14–5.8)
SODIUM SERPL-SCNC: 133 MMOL/L (ref 136–145)
WBC NRBC COR # BLD AUTO: 11.16 10*3/MM3 (ref 3.4–10.8)

## 2025-02-18 PROCEDURE — 99232 SBSQ HOSP IP/OBS MODERATE 35: CPT | Performed by: STUDENT IN AN ORGANIZED HEALTH CARE EDUCATION/TRAINING PROGRAM

## 2025-02-18 PROCEDURE — 63710000001 INSULIN LISPRO (HUMAN) PER 5 UNITS: Performed by: STUDENT IN AN ORGANIZED HEALTH CARE EDUCATION/TRAINING PROGRAM

## 2025-02-18 PROCEDURE — 85025 COMPLETE CBC W/AUTO DIFF WBC: CPT | Performed by: INTERNAL MEDICINE

## 2025-02-18 PROCEDURE — 94799 UNLISTED PULMONARY SVC/PX: CPT

## 2025-02-18 PROCEDURE — 94664 DEMO&/EVAL PT USE INHALER: CPT

## 2025-02-18 PROCEDURE — 94761 N-INVAS EAR/PLS OXIMETRY MLT: CPT

## 2025-02-18 PROCEDURE — 82948 REAGENT STRIP/BLOOD GLUCOSE: CPT | Performed by: STUDENT IN AN ORGANIZED HEALTH CARE EDUCATION/TRAINING PROGRAM

## 2025-02-18 PROCEDURE — 99232 SBSQ HOSP IP/OBS MODERATE 35: CPT | Performed by: INTERNAL MEDICINE

## 2025-02-18 PROCEDURE — 82948 REAGENT STRIP/BLOOD GLUCOSE: CPT

## 2025-02-18 PROCEDURE — 80048 BASIC METABOLIC PNL TOTAL CA: CPT | Performed by: INTERNAL MEDICINE

## 2025-02-18 PROCEDURE — 83735 ASSAY OF MAGNESIUM: CPT | Performed by: INTERNAL MEDICINE

## 2025-02-18 PROCEDURE — 63710000001 REVEFENACIN 175 MCG/3ML SOLUTION: Performed by: STUDENT IN AN ORGANIZED HEALTH CARE EDUCATION/TRAINING PROGRAM

## 2025-02-18 PROCEDURE — 84100 ASSAY OF PHOSPHORUS: CPT | Performed by: INTERNAL MEDICINE

## 2025-02-18 PROCEDURE — 63710000001 INSULIN GLARGINE PER 5 UNITS: Performed by: INTERNAL MEDICINE

## 2025-02-18 RX ADMIN — APIXABAN 5 MG: 5 TABLET, FILM COATED ORAL at 21:14

## 2025-02-18 RX ADMIN — COLLAGENASE SANTYL 1 APPLICATION: 250 OINTMENT TOPICAL at 09:07

## 2025-02-18 RX ADMIN — FAMOTIDINE 20 MG: 20 TABLET ORAL at 09:05

## 2025-02-18 RX ADMIN — Medication 10 ML: at 21:13

## 2025-02-18 RX ADMIN — TOBRAMYCIN 300 MG: 300 SOLUTION RESPIRATORY (INHALATION) at 21:06

## 2025-02-18 RX ADMIN — Medication 10 ML: at 09:06

## 2025-02-18 RX ADMIN — INSULIN LISPRO 2 UNITS: 100 INJECTION, SOLUTION INTRAVENOUS; SUBCUTANEOUS at 21:35

## 2025-02-18 RX ADMIN — TOBRAMYCIN 300 MG: 300 SOLUTION RESPIRATORY (INHALATION) at 07:45

## 2025-02-18 RX ADMIN — FERROUS SULFATE TAB 325 MG (65 MG ELEMENTAL FE) 325 MG: 325 (65 FE) TAB at 09:05

## 2025-02-18 RX ADMIN — BUSPIRONE HYDROCHLORIDE 15 MG: 5 TABLET ORAL at 21:14

## 2025-02-18 RX ADMIN — INSULIN LISPRO 6 UNITS: 100 INJECTION, SOLUTION INTRAVENOUS; SUBCUTANEOUS at 12:33

## 2025-02-18 RX ADMIN — BUSPIRONE HYDROCHLORIDE 15 MG: 5 TABLET ORAL at 09:05

## 2025-02-18 RX ADMIN — Medication 5 MG: at 21:36

## 2025-02-18 RX ADMIN — MONTELUKAST 10 MG: 10 TABLET, FILM COATED ORAL at 21:14

## 2025-02-18 RX ADMIN — INSULIN LISPRO 2 UNITS: 100 INJECTION, SOLUTION INTRAVENOUS; SUBCUTANEOUS at 09:04

## 2025-02-18 RX ADMIN — ARFORMOTEROL TARTRATE 15 MCG: 15 SOLUTION RESPIRATORY (INHALATION) at 07:45

## 2025-02-18 RX ADMIN — Medication 10 ML: at 09:05

## 2025-02-18 RX ADMIN — INSULIN GLARGINE 5 UNITS: 100 INJECTION, SOLUTION SUBCUTANEOUS at 09:04

## 2025-02-18 RX ADMIN — BUDESONIDE 0.5 MG: 0.5 INHALANT RESPIRATORY (INHALATION) at 07:45

## 2025-02-18 RX ADMIN — GUAIFENESIN 600 MG: 600 TABLET ORAL at 21:14

## 2025-02-18 RX ADMIN — ARFORMOTEROL TARTRATE 15 MCG: 15 SOLUTION RESPIRATORY (INHALATION) at 21:06

## 2025-02-18 RX ADMIN — ATORVASTATIN CALCIUM 20 MG: 20 TABLET, FILM COATED ORAL at 21:13

## 2025-02-18 RX ADMIN — INSULIN LISPRO 4 UNITS: 100 INJECTION, SOLUTION INTRAVENOUS; SUBCUTANEOUS at 18:06

## 2025-02-18 RX ADMIN — REVEFENACIN 175 MCG: 175 SOLUTION RESPIRATORY (INHALATION) at 07:45

## 2025-02-18 RX ADMIN — BUDESONIDE 0.5 MG: 0.5 INHALANT RESPIRATORY (INHALATION) at 21:06

## 2025-02-18 RX ADMIN — GUAIFENESIN 600 MG: 600 TABLET ORAL at 09:05

## 2025-02-18 RX ADMIN — ASPIRIN 81 MG: 81 TABLET, COATED ORAL at 09:05

## 2025-02-18 NOTE — PLAN OF CARE
Goal Outcome Evaluation:           Progress: improving  Outcome Evaluation: Patient able to sleep intermittently during night. Turned [id number]. No issues during shift. Adiel Treadwell RN

## 2025-02-18 NOTE — PROGRESS NOTES
Pulmonary / Critical Care Progress Note      Patient Name: Preston Wallis  : 1965  MRN: [id number]  Primary Care Physician:  Kimmy Riley MD  Date of admission: 2025    Subjective   Subjective   Follow-up for acute on chronic respiratory failure hypoxic    No acute events overnight  Remains on 2 L  Remains bedridden and fatigued  Denies any chest pain or chest tightness  Denies cough  No fevers or chills    Objective   Objective     Vitals:   Temp:  [97.8 °F (36.6 °C)-99.9 °F (37.7 °C)] 99.9 °F (37.7 °C)  Heart Rate:  [70-80] 79  Resp:  [16-19] 19  BP: (115-151)/(43-61) 151/61  Flow (L/min) (Oxygen Therapy):  [2] 2    Physical Exam   Vital Signs Reviewed   General: Chronically ill-appearing, obese male, Alert, NAD, resting in bed  Chest: Diminished bibasilarly to auscultation, no work of breathing noted on baseline 2 L NC  CV: RRR, no MGR, pulses 2+, equal.  Abd:  Soft, appropriately tender, ND, colostomy bag in place  EXT:  no clubbing, no cyanosis, no edema  Neuro:  A&Ox3, CN grossly intact, no focal deficits.  Skin: No rashes or lesions noted      Result Review    Result Review:  I have personally reviewed the results from the time of this admission to 2025 13:42 EST and agree with these findings:  [x]  Laboratory  [x]  Microbiology  [x]  Radiology  []  EKG/Telemetry   []  Cardiology/Vascular   []  Pathology  []  Old records  []  Other:  Most notable findings include:        Lab 25  0423 25  0511 25  0343 02/15/25  0559 25  0543 25  0356 25  0410   WBC 11.16* 11.98*  --   --   --   --  14.04*   HEMOGLOBIN 8.2* 8.4*  --   --   --   --  9.4*   HEMATOCRIT 26.0* 25.9*  --   --   --   --  29.2*   PLATELETS 653* 575*  --   --   --   --  325   SODIUM 133* 134* 132*  --  137  --  136   POTASSIUM 3.7 4.0 4.0  --  4.2  --  4.0   CHLORIDE 97* 97* 94*  --  99  --  96*   CO2 26.8 26.7 24.5  --  29.5*  --  29.7*   BUN 68* 64* 67*  --  58*  --  59*   CREATININE 2.82*  2.63* 2.82*  --  2.62*  --  2.49*   GLUCOSE 184* 155* 275*  --  135*  --  173*   CALCIUM 9.2 9.0 8.4*  8.5* 8.9 8.7 8.6 8.7   PHOSPHORUS 2.9 2.6  --   --  3.9  --  2.8   TOTAL PROTEIN  --  6.5  --   --   --   --   --    ALBUMIN  --  2.5*  --   --  2.5*  --   --    GLOBULIN  --  4.0  --   --   --   --   --      2/5 chest x-ray with by lateral upper lobe airspace disease consistent bronchiectasis and trace bilateral effusions  Sputum culture with multidrug-resistant Pseudomonas    Assessment & Plan   Assessment / Plan     Active Hospital Problems:  Active Hospital Problems    Diagnosis    • Colostomy status    • Chronic kidney disease, stage IV (severe)    • 1. Incision and drainage of posterior perianal abscess 2. Sharp excisional debridement through skin and subcutaneous tissue in the posterior perianal area, 9 x 6 x 1 cm    • Perianal abscess    • Wound of gluteal cleft    • Bronchiectasis with acute exacerbation    • Pneumonia due to Pseudomonas species    • History of anemia due to chronic kidney disease    • Bronchiectasis without complication    • COPD exacerbation      Impression:  Acute on chronic hypoxemic respiratory failure  Multidrug-resistant Pseudomonas pneumonia  Bronchiectasis with acute exacerbation  Acute exacerbation of COPD  Paroxysmal atrial fibrillation  Perianal abscess status post drainage  Status post diverting colostomy  Hypomagnesemia  Class II obesity with BMI 35.1    Plan:  Continue supplemental oxygen to keep SpO2 greater 90%.  Patient uses 2 L at baseline  Continue NIPPV nightly and with naps  Status post bronchoscopy 2/3 with airway clearance.  Positive for MDRO Pseudomonas infection  Completed 7 days of Zosyn  Continue MOIZ nebs 1 month on, 1 month off  Renal managing diuretics.  Continue to trend renal function electrolytes.  Continue Singulair  Encourage activity  Holding Eliquis due to anemia  May benefit from rehab    VTE Prophylaxis:  Mechanical VTE prophylaxis orders are  present.    CODE STATUS:   Code Status (Patient has no pulse and is not breathing): CPR (Attempt to Resuscitate)  Medical Interventions (Patient has pulse or is breathing): Full Support      Labs, imaging, microbiology, notes and medications personally reviewed  Discussed with primary    Electronically signed by Deisi Nichole MD, 02/18/25, 1:42 PM EST.

## 2025-02-18 NOTE — PLAN OF CARE
Goal Outcome Evaluation:  Plan of Care Reviewed With: patient        Progress: no change  Outcome Evaluation: colostomy with dark brown soft formed output, some blood noted in colostomy bag.  lopez catheter in place, cloudy yellow urine output.  No complaints of pain, dressing changed per order.  wound vac in place.  Blood pressure meds held this morning due to low DBP,

## 2025-02-18 NOTE — PROGRESS NOTES
[facility]     Progress Note    Patient Name: Preston Wallis  : 1965  MRN: [id number]  Primary Care Physician:  Kimmy Riley MD  Date of admission: 2025    Subjective  last 24 hours patient has done well and there is no episode of bleeding and no new issues concerns.    Review of Systems  All review of systems are negative except as mentioned in subjective complaints.    Objective   Objective     Vitals:   Temp:  [96.3 °F (35.7 °C)-99.9 °F (37.7 °C)] 99.9 °F (37.7 °C)  Heart Rate:  [70-80] 79  Resp:  [16-19] 19  BP: (115-151)/(43-61) 151/61  Flow (L/min) (Oxygen Therapy):  [2] 2  Physical Exam    Constitutional: Awake, alert responsive, conversant, no obvious distress              Psychiatric:  Appropriate affect, cooperative   Neurologic:  Awake alert ,oriented x 3, strength symmetric in all extremities, Cranial Nerves grossly intact to confrontation, speech clear   Eyes:   PERRLA, sclerae anicteric, no conjunctival injection   HEENT:  Moist mucous membranes, no nasal or eye discharge, no throat congestion   Neck:   Supple, no thyromegaly, no lymphadenopathy, trachea midline, no elevated JVD   Respiratory:  Clear to auscultation bilaterally, nonlabored respirations    Cardiovascular: RRR, no murmurs, rubs, or gallops, palpable pedal pulses bilaterally, No bilateral ankle edema   Gastrointestinal: Positive bowel sounds, soft, nontender, nondistended, no organomegaly   Musculoskeletal:  No clubbing or cyanosis to extremities,muscle wasting, joint swelling, muscle weakness             Skin:                      No rashes, bruising, skin ulcers, petechiae or ecchymosis    Result Review    Result Review:  I have personally reviewed the results from the time of this admission to 2025 11:30 EST and agree with these findings:  []  Laboratory  []  Microbiology  []  Radiology  []  EKG/Telemetry   []  Cardiology/Vascular   []  Pathology  []  Old records  []  Other:    Results from last 7 days    Lab Units 02/18/25  0423 02/17/25  0511 02/12/25  0410   WBC 10*3/mm3 11.16* 11.98* 14.04*   HEMOGLOBIN g/dL 8.2* 8.4* 9.4*   PLATELETS 10*3/mm3 653* 575* 325     Results from last 7 days   Lab Units 02/18/25  0423 02/17/25  0511 02/16/25  0343 02/15/25  0559 02/14/25  0543 02/13/25  0356 02/12/25  0410   SODIUM mmol/L 133* 134* 132*  --  137  --  136   POTASSIUM mmol/L 3.7 4.0 4.0  --  4.2  --  4.0   CHLORIDE mmol/L 97* 97* 94*  --  99  --  96*   CO2 mmol/L 26.8 26.7 24.5  --  29.5*  --  29.7*   ANION GAP mmol/L 9.2 10.3 13.5  --  8.5  --  10.3   BUN mg/dL 68* 64* 67*  --  58*  --  59*   CREATININE mg/dL 2.82* 2.63* 2.82*  --  2.62*  --  2.49*   GLUCOSE mg/dL 184* 155* 275*  --  135*  --  173*   EGFR mL/min/1.73 25.0* 27.2* 25.0*  --  27.3*  --  29.0*   CALCIUM mg/dL 9.2 9.0 8.4*  8.5*   < > 8.7   < > 8.7   MAGNESIUM mg/dL 1.8 1.9  --   --   --   --  2.1   BILIRUBIN mg/dL  --  0.2  --   --   --   --   --    ALK PHOS U/L  --  130*  --   --   --   --   --    ALT (SGPT) U/L  --  16  --   --   --   --   --    AST (SGOT) U/L  --  23  --   --   --   --   --     < > = values in this interval not displayed.       Assessment & Plan   Assessment / Plan       Active Hospital Problems:    Active Hospital Problems    Diagnosis  POA   • Colostomy status [Z93.3]  Not Applicable   • Chronic kidney disease, stage IV (severe) [N18.4]  Unknown     2.5 g of proteinuria  Baseline creatinine 2.3       • 1. Incision and drainage of posterior perianal abscess 2. Sharp excisional debridement through skin and subcutaneous tissue in the posterior perianal area, 9 x 6 x 1 cm [Z98.890]  Not Applicable   • Perianal abscess [K61.0]  Unknown   • Wound of gluteal cleft [S30.274F]  Unknown   • Bronchiectasis with acute exacerbation [J47.1]  Unknown   • Pneumonia due to Pseudomonas species [J15.1]  Unknown   • History of anemia due to chronic kidney disease [N18.9, Z86.2]  Not Applicable     Start Procrit     • Bronchiectasis without complication  [J47.9]  Unknown   • COPD exacerbation [J44.1]  Unknown       Plan:     Patient's volume status is much improved and we are going to stop diuretics today   Sodium stable renal function slightly worse       Electronically signed by Nelsy Marx MD, 02/18/25, 11:30 AM EST.

## 2025-02-18 NOTE — PROGRESS NOTES
[facility]   Hospitalist Progress Note  Date: 2025  Patient Name: Preston Wallis  : 1965  MRN: [id number]  Date of admission: 2025  Room/Bed: [facility]      Subjective   Subjective     Chief Complaint:   Shortness of breath    Summary:Preston Wallis is a 59 y.o. male with medical history of MDR Pseudomonas pneumonia recently discharged  on IV ertapenem, COPD, CHF, who presented with shortness of breath.  Initial CT imaging showing a new spiculated left lower lobe nodule, bronchiectasis and enlarging pericardial effusion.  Pulmonary and cardiology is consulted.  TTE only showed a small pericardial effusion, EF 49%, grade 1 diastolic dysfunction.  Wound care noted perianal tissue necrosis, CT abdomen was obtained showed a 4.5 x 1.8 x 3.5 posterior anal abscess, general surgery consulted and patient underwent I&D on .  Underwent bronchoscopy on  still showing MDR Pseudomonas treated with IV meropenem and patient continued with MOIZ nebs.  Perianal culture with Enterococcus VRE and MDR Citrobacter treated with linezolid and Zosyn.  To help with wound healing patient underwent laparoscopic diverting loop colostomy along with repair of chronically incarcerated 3 cm umbilical hernia on 2025.  Has required several transfusions during hospitalization as recently 3 units on 2025.  Surgery suspect bleeding was from his perianal abscess.     Interval Followup:   Continued stability in patient's hemoglobin.  Will trial patient back on his home Eliquis this evening.  Patient still adamant he is discharging home with home health.  Discussed with case management still working towards arranging everything required at home.  Patient currently with wound VAC in place.    Objective   Objective     Vitals:   Temp:  [97.8 °F (36.6 °C)-99.9 °F (37.7 °C)] 98.9 °F (37.2 °C)  Heart Rate:  [70-94] 94  Resp:  [16-19] 17  BP: (115-155)/(43-61) 155/61  Flow (L/min) (Oxygen Therapy):  [2] 2    Physical  Exam   General: Sleeping, wakes to voice  Eyes: pupils equal, no scleral icterus  Cardiovascular: RRR, no murmurs   Pulmonary: CTA bilaterally; no wheezes; no conversational dyspnea  Abdomen soft has some tenderness around ostomy site, ostomy pink, brown stool  Doyle in place (at home states has had intermittent straight catheterization)  Negative pressure wound VAC for perianal wound in place       Result Review    Result Review:  I have personally reviewed these results:  [x]  Laboratory      Lab 02/18/25  0423 02/17/25  0511 02/12/25  0410   WBC 11.16* 11.98* 14.04*   HEMOGLOBIN 8.2* 8.4* 9.4*   HEMATOCRIT 26.0* 25.9* 29.2*   PLATELETS 653* 575* 325   NEUTROS ABS 6.07 6.62 9.81*   IMMATURE GRANS (ABS) 0.11* 0.17* 0.07*   LYMPHS ABS 2.00 2.31 1.84   MONOS ABS 1.56* 1.49* 1.28*   EOS ABS 1.32* 1.30* 0.99*   MCV 89.7 89.6 90.4         Lab 02/18/25  0423 02/17/25  0511 02/16/25  0343 02/15/25  0559 02/14/25  0543 02/13/25  0356 02/12/25  0410   SODIUM 133* 134* 132*  --  137  --  136   POTASSIUM 3.7 4.0 4.0  --  4.2  --  4.0   CHLORIDE 97* 97* 94*  --  99  --  96*   CO2 26.8 26.7 24.5  --  29.5*  --  29.7*   ANION GAP 9.2 10.3 13.5  --  8.5  --  10.3   BUN 68* 64* 67*  --  58*  --  59*   CREATININE 2.82* 2.63* 2.82*  --  2.62*  --  2.49*   EGFR 25.0* 27.2* 25.0*  --  27.3*  --  29.0*   GLUCOSE 184* 155* 275*  --  135*  --  173*   CALCIUM 9.2 9.0 8.4*  8.5*   < > 8.7   < > 8.7   MAGNESIUM 1.8 1.9  --   --   --   --  2.1   PHOSPHORUS 2.9 2.6  --   --  3.9  --  2.8    < > = values in this interval not displayed.         Lab 02/17/25  0511 02/14/25  0543   TOTAL PROTEIN 6.5  --    ALBUMIN 2.5* 2.5*   GLOBULIN 4.0  --    ALT (SGPT) 16  --    AST (SGOT) 23  --    BILIRUBIN 0.2  --    ALK PHOS 130*  --          Lab 02/16/25  0343   PROBNP 1,131.0*                 Brief Urine Lab Results  (Last result in the past 365 days)        Color   Clarity   Blood   Leuk Est   Nitrite   Protein   CREAT   Urine HCG        02/11/25 1124              14.2         02/11/25 1125 Yellow   Clear   Small (1+)   Trace   Negative   100 mg/dL (2+)                 [x]  Microbiology   Microbiology Results (last 10 days)       Procedure Component Value - Date/Time    Blood Culture - Blood, Arm, Left [[id number]]  (Normal) Collected: 02/10/25 1014    Lab Status: Final result Specimen: Blood from Arm, Left Updated: 02/15/25 1030     Blood Culture No growth at 5 days    Blood Culture - Blood, Hand, Left [[id number]]  (Normal) Collected: 02/10/25 1014    Lab Status: Final result Specimen: Blood from Hand, Left Updated: 02/15/25 1030     Blood Culture No growth at 5 days    Narrative:      Less than seven (7) mL's of blood was collected.  Insufficient quantity may yield false negative results.          [x]  Radiology  No radiology results for the last 7 days  []  EKG/Telemetry   []  Cardiology/Vascular   []  Pathology  []  Old records  []  Other:    Assessment & Plan   Assessment / Plan     Assessment:  Acute on chronic hypoxemic respiratory failure  Acute on chronic COPD with exacerbation  Bronchiectasis with acute exacerbation  MDR Pseudomonas pneumonia  Perianal abscess with Enterococcus VRE and MDR Citrobacter  Status post diverting colostomy  Blood loss anemia requiring transfusion  Paroxysmal A-fib previously on Eliquis  Pericardial effusion  AOCD and iron deficiency anemia  COPD  CKD-3 b/l Creatinine approximately 2.2  HFpEF 49%, grade 1, follows with dr bishop  Hypertension  IDDM 2  Chronic anxiety  Morbid obesity  BPH, chronic intermittent catheterization use outpatient     Plan  Discussed with surgery, suspect bleeding was from his perianal wound, trialing resumption of Eliquis starting the evening of the 18th  Continues with current wound VAC, will discuss with wound care as well as surgery before discharging  Creatinine slightly up to 2.8 today.  Nephrology holding further diuretics  Continue Brovana Pulmicort, nebulizers, montelukast, guaifenesin and  respiratory hygiene  Continue MOIZ mejias has completed IV antibiotics for Pseudomonas. Apprec pulm assistance  Plan for 30 days on 30 days off, discussed with clinical pharmacist, would require prior authorization for MOIZ mejias, has been in the hospital long enough at this point that is close to finishing his initial 30-day treatment depending on timing of discharge may need a course outpatient at discharge versus follow-up with pulmonology for new prescription after his 30-day off.  Continue Coreg, losartan monitor blood pressure renal function.  Has received several transfusions, received a dose of EPO on 2/15.  Last transfusion 2/7 received 3 units  Continue p.o. iron  Continue aspirin, statin monitor  Holding Flomax and finasteride as patient with chronic Doyle catheter in currently, outpatient does chronic intermittent catheterization but leaving continuous Doyle for now to help with perianal wound healing.  Given chronic catheterization need to continue these medications at discharge   Continue SSI monitor glucose, glargine, titrate  Continue BuSpar  Continue famotidine  PT/OT  Check a.m. CBC, BMP, magnesium, phosphorus  Continue hospitalization at current level of care     Dispo: Patient wants to go home at time of discharge with home health only and does not want to go back to rehab, will need definitive plan with wound VAC, surgery and stability with treatments prior to discharge.         Discussed with RN.  Discussed with case management    VTE Prophylaxis:  Mechanical VTE prophylaxis orders are present.        CODE STATUS:   Code Status (Patient has no pulse and is not breathing): CPR (Attempt to Resuscitate)  Medical Interventions (Patient has pulse or is breathing): Full Support      Electronically signed by Kevon Vargas MD, 2/18/2025, 16:50 EST.

## 2025-02-19 LAB
ANION GAP SERPL CALCULATED.3IONS-SCNC: 9.5 MMOL/L (ref 5–15)
BASOPHILS # BLD AUTO: 0.1 10*3/MM3 (ref 0–0.2)
BASOPHILS NFR BLD AUTO: 0.8 % (ref 0–1.5)
BUN SERPL-MCNC: 75 MG/DL (ref 6–20)
BUN/CREAT SERPL: 30.7 (ref 7–25)
CALCIUM SPEC-SCNC: 9.5 MG/DL (ref 8.6–10.5)
CHLORIDE SERPL-SCNC: 98 MMOL/L (ref 98–107)
CO2 SERPL-SCNC: 26.5 MMOL/L (ref 22–29)
CREAT SERPL-MCNC: 2.44 MG/DL (ref 0.76–1.27)
DEPRECATED RDW RBC AUTO: 48.9 FL (ref 37–54)
EGFRCR SERPLBLD CKD-EPI 2021: 29.7 ML/MIN/1.73
EOSINOPHIL # BLD AUTO: 1.31 10*3/MM3 (ref 0–0.4)
EOSINOPHIL NFR BLD AUTO: 10.2 % (ref 0.3–6.2)
ERYTHROCYTE [DISTWIDTH] IN BLOOD BY AUTOMATED COUNT: 15.1 % (ref 12.3–15.4)
FUNGUS WND CULT: ABNORMAL
GLUCOSE BLDC GLUCOMTR-MCNC: 160 MG/DL (ref 70–99)
GLUCOSE BLDC GLUCOMTR-MCNC: 199 MG/DL (ref 70–99)
GLUCOSE BLDC GLUCOMTR-MCNC: 242 MG/DL (ref 70–99)
GLUCOSE BLDC GLUCOMTR-MCNC: 246 MG/DL (ref 70–99)
GLUCOSE SERPL-MCNC: 170 MG/DL (ref 65–99)
HCT VFR BLD AUTO: 26.5 % (ref 37.5–51)
HGB BLD-MCNC: 8.5 G/DL (ref 13–17.7)
IMM GRANULOCYTES # BLD AUTO: 0.15 10*3/MM3 (ref 0–0.05)
IMM GRANULOCYTES NFR BLD AUTO: 1.2 % (ref 0–0.5)
LYMPHOCYTES # BLD AUTO: 2.39 10*3/MM3 (ref 0.7–3.1)
LYMPHOCYTES NFR BLD AUTO: 18.6 % (ref 19.6–45.3)
MAGNESIUM SERPL-MCNC: 1.8 MG/DL (ref 1.6–2.6)
MCH RBC QN AUTO: 29.1 PG (ref 26.6–33)
MCHC RBC AUTO-ENTMCNC: 32.1 G/DL (ref 31.5–35.7)
MCV RBC AUTO: 90.8 FL (ref 79–97)
MONOCYTES # BLD AUTO: 1.82 10*3/MM3 (ref 0.1–0.9)
MONOCYTES NFR BLD AUTO: 14.1 % (ref 5–12)
NEUTROPHILS NFR BLD AUTO: 55.1 % (ref 42.7–76)
NEUTROPHILS NFR BLD AUTO: 7.11 10*3/MM3 (ref 1.7–7)
NRBC BLD AUTO-RTO: 0.5 /100 WBC (ref 0–0.2)
PHOSPHATE SERPL-MCNC: 2.6 MG/DL (ref 2.5–4.5)
PLATELET # BLD AUTO: 703 10*3/MM3 (ref 140–450)
PMV BLD AUTO: 8.5 FL (ref 6–12)
POTASSIUM SERPL-SCNC: 3.8 MMOL/L (ref 3.5–5.2)
RBC # BLD AUTO: 2.92 10*6/MM3 (ref 4.14–5.8)
SODIUM SERPL-SCNC: 134 MMOL/L (ref 136–145)
WBC NRBC COR # BLD AUTO: 12.88 10*3/MM3 (ref 3.4–10.8)

## 2025-02-19 PROCEDURE — 85025 COMPLETE CBC W/AUTO DIFF WBC: CPT | Performed by: INTERNAL MEDICINE

## 2025-02-19 PROCEDURE — 84100 ASSAY OF PHOSPHORUS: CPT | Performed by: INTERNAL MEDICINE

## 2025-02-19 PROCEDURE — 97605 NEG PRS WND THER DME<=50SQCM: CPT

## 2025-02-19 PROCEDURE — 83735 ASSAY OF MAGNESIUM: CPT | Performed by: INTERNAL MEDICINE

## 2025-02-19 PROCEDURE — 99232 SBSQ HOSP IP/OBS MODERATE 35: CPT | Performed by: STUDENT IN AN ORGANIZED HEALTH CARE EDUCATION/TRAINING PROGRAM

## 2025-02-19 PROCEDURE — 94799 UNLISTED PULMONARY SVC/PX: CPT

## 2025-02-19 PROCEDURE — 80048 BASIC METABOLIC PNL TOTAL CA: CPT | Performed by: INTERNAL MEDICINE

## 2025-02-19 PROCEDURE — 94761 N-INVAS EAR/PLS OXIMETRY MLT: CPT

## 2025-02-19 PROCEDURE — 82948 REAGENT STRIP/BLOOD GLUCOSE: CPT | Performed by: STUDENT IN AN ORGANIZED HEALTH CARE EDUCATION/TRAINING PROGRAM

## 2025-02-19 PROCEDURE — 63710000001 INSULIN GLARGINE PER 5 UNITS: Performed by: INTERNAL MEDICINE

## 2025-02-19 PROCEDURE — 63710000001 REVEFENACIN 175 MCG/3ML SOLUTION: Performed by: STUDENT IN AN ORGANIZED HEALTH CARE EDUCATION/TRAINING PROGRAM

## 2025-02-19 PROCEDURE — 82948 REAGENT STRIP/BLOOD GLUCOSE: CPT

## 2025-02-19 PROCEDURE — 99232 SBSQ HOSP IP/OBS MODERATE 35: CPT | Performed by: INTERNAL MEDICINE

## 2025-02-19 PROCEDURE — 63710000001 INSULIN LISPRO (HUMAN) PER 5 UNITS: Performed by: STUDENT IN AN ORGANIZED HEALTH CARE EDUCATION/TRAINING PROGRAM

## 2025-02-19 PROCEDURE — 94664 DEMO&/EVAL PT USE INHALER: CPT

## 2025-02-19 RX ADMIN — MONTELUKAST 10 MG: 10 TABLET, FILM COATED ORAL at 20:58

## 2025-02-19 RX ADMIN — INSULIN GLARGINE 5 UNITS: 100 INJECTION, SOLUTION SUBCUTANEOUS at 09:14

## 2025-02-19 RX ADMIN — ARFORMOTEROL TARTRATE 15 MCG: 15 SOLUTION RESPIRATORY (INHALATION) at 10:04

## 2025-02-19 RX ADMIN — INSULIN LISPRO 4 UNITS: 100 INJECTION, SOLUTION INTRAVENOUS; SUBCUTANEOUS at 21:17

## 2025-02-19 RX ADMIN — FAMOTIDINE 20 MG: 20 TABLET ORAL at 09:14

## 2025-02-19 RX ADMIN — Medication 10 ML: at 20:58

## 2025-02-19 RX ADMIN — INSULIN LISPRO 2 UNITS: 100 INJECTION, SOLUTION INTRAVENOUS; SUBCUTANEOUS at 17:55

## 2025-02-19 RX ADMIN — BUSPIRONE HYDROCHLORIDE 15 MG: 5 TABLET ORAL at 20:58

## 2025-02-19 RX ADMIN — TOBRAMYCIN 300 MG: 300 SOLUTION RESPIRATORY (INHALATION) at 10:04

## 2025-02-19 RX ADMIN — FERROUS SULFATE TAB 325 MG (65 MG ELEMENTAL FE) 325 MG: 325 (65 FE) TAB at 09:14

## 2025-02-19 RX ADMIN — BUDESONIDE 0.5 MG: 0.5 INHALANT RESPIRATORY (INHALATION) at 10:03

## 2025-02-19 RX ADMIN — ATORVASTATIN CALCIUM 20 MG: 20 TABLET, FILM COATED ORAL at 20:59

## 2025-02-19 RX ADMIN — INSULIN LISPRO 4 UNITS: 100 INJECTION, SOLUTION INTRAVENOUS; SUBCUTANEOUS at 13:03

## 2025-02-19 RX ADMIN — APIXABAN 5 MG: 5 TABLET, FILM COATED ORAL at 09:14

## 2025-02-19 RX ADMIN — GUAIFENESIN 600 MG: 600 TABLET ORAL at 09:14

## 2025-02-19 RX ADMIN — COLLAGENASE SANTYL 1 APPLICATION: 250 OINTMENT TOPICAL at 13:04

## 2025-02-19 RX ADMIN — BUDESONIDE 0.5 MG: 0.5 INHALANT RESPIRATORY (INHALATION) at 20:10

## 2025-02-19 RX ADMIN — GUAIFENESIN 600 MG: 600 TABLET ORAL at 20:58

## 2025-02-19 RX ADMIN — Medication 10 ML: at 09:15

## 2025-02-19 RX ADMIN — ARFORMOTEROL TARTRATE 15 MCG: 15 SOLUTION RESPIRATORY (INHALATION) at 20:10

## 2025-02-19 RX ADMIN — Medication 5 MG: at 21:58

## 2025-02-19 RX ADMIN — ASPIRIN 81 MG: 81 TABLET, COATED ORAL at 09:14

## 2025-02-19 RX ADMIN — BUSPIRONE HYDROCHLORIDE 15 MG: 5 TABLET ORAL at 09:14

## 2025-02-19 RX ADMIN — APIXABAN 5 MG: 5 TABLET, FILM COATED ORAL at 20:58

## 2025-02-19 RX ADMIN — REVEFENACIN 175 MCG: 175 SOLUTION RESPIRATORY (INHALATION) at 10:04

## 2025-02-19 RX ADMIN — INSULIN LISPRO 2 UNITS: 100 INJECTION, SOLUTION INTRAVENOUS; SUBCUTANEOUS at 09:14

## 2025-02-19 RX ADMIN — TOBRAMYCIN 300 MG: 300 SOLUTION RESPIRATORY (INHALATION) at 20:10

## 2025-02-19 NOTE — PLAN OF CARE
Goal Outcome Evaluation:  Plan of Care Reviewed With: patient        Progress: no change  Outcome Evaluation: A&Ox4, no complaints of pain, wound vac and dressing changed by wound care nurse.  Morning dose of coreg and cozaar held due to BP.  Doyle in place with couldy yellow urine output, colostomy with formed stool and little bit of blood noted in ostomy.  No acute event this shift.

## 2025-02-19 NOTE — SIGNIFICANT NOTE
home vac approved whenever patient is medically ready for DC.   Surgeon and provider aware of approval when medically ready       Did make surgeon aware of blood noted in pouch yesterday and mild bleeding on stoma with change but was stopped with light pressure and no bleeding noted after new pouch applied per staff report.     Stoma continues to be necrotic and surgeon was made aware by photo and message on Monday.   Bar remains in place and surgeon will remove in office next week.

## 2025-02-19 NOTE — PROGRESS NOTES
[facility]   Hospitalist Progress Note  Date: 2025  Patient Name: Preston Wallis  : 1965  MRN: [id number]  Date of admission: 2025  Room/Bed: [facility]      Subjective   Subjective     Chief Complaint:   Shortness of breath    Summary:Preston Wallis is a 59 y.o. male with medical history of MDR Pseudomonas pneumonia recently discharged  on IV ertapenem, COPD, CHF, who presented with shortness of breath.  Initial CT imaging showing a new spiculated left lower lobe nodule, bronchiectasis and enlarging pericardial effusion.  Pulmonary and cardiology is consulted.  TTE only showed a small pericardial effusion, EF 49%, grade 1 diastolic dysfunction.  Wound care noted perianal tissue necrosis, CT abdomen was obtained showed a 4.5 x 1.8 x 3.5 posterior anal abscess, general surgery consulted and patient underwent I&D on .  Underwent bronchoscopy on  still showing MDR Pseudomonas treated with IV meropenem and patient continued with MOIZ nebs.  Perianal culture with Enterococcus VRE and MDR Citrobacter treated with linezolid and Zosyn.  To help with wound healing patient underwent laparoscopic diverting loop colostomy along with repair of chronically incarcerated 3 cm umbilical hernia on 2025.  Has required several transfusions during hospitalization as recently 3 units on 2025.  Surgery suspect bleeding was from his perianal abscess.     Interval Followup:   Stability in hemoglobin over the past 24 hours afternoon reinitiation of home Eliquis.Discussed with wound care, home VAC approved whenever patient is medically ready for discharge.  Discussing with case management home health services.  Diuretics currently on hold, patient also hypotensive this morning.  Will hold further doses of losartan at this time.    Objective   Objective     Vitals:   Temp:  [97.9 °F (36.6 °C)-98.5 °F (36.9 °C)] 98.1 °F (36.7 °C)  Heart Rate:  [85-98] 92  Resp:  [16-22] 18  BP: (115-145)/(38-55)  144/46  Flow (L/min) (Oxygen Therapy):  [2] 2    Physical Exam   General: Sitting up in bed resting comfortably, no acute distress  Eyes: pupils equal, no scleral icterus  Cardiovascular: RRR, no murmurs   Pulmonary: CTA bilaterally; no wheezes; no conversational dyspnea  Abdomen soft has some tenderness around ostomy site, ostomy pink, brown stool  Doyle in place (at home states has had intermittent straight catheterization)  Negative pressure wound VAC for perianal wound in place       Result Review    Result Review:  I have personally reviewed these results:  [x]  Laboratory      Lab 02/19/25 0428 02/18/25  0423 02/17/25  0511   WBC 12.88* 11.16* 11.98*   HEMOGLOBIN 8.5* 8.2* 8.4*   HEMATOCRIT 26.5* 26.0* 25.9*   PLATELETS 703* 653* 575*   NEUTROS ABS 7.11* 6.07 6.62   IMMATURE GRANS (ABS) 0.15* 0.11* 0.17*   LYMPHS ABS 2.39 2.00 2.31   MONOS ABS 1.82* 1.56* 1.49*   EOS ABS 1.31* 1.32* 1.30*   MCV 90.8 89.7 89.6         Lab 02/19/25  0428 02/18/25  0423 02/17/25  0511   SODIUM 134* 133* 134*   POTASSIUM 3.8 3.7 4.0   CHLORIDE 98 97* 97*   CO2 26.5 26.8 26.7   ANION GAP 9.5 9.2 10.3   BUN 75* 68* 64*   CREATININE 2.44* 2.82* 2.63*   EGFR 29.7* 25.0* 27.2*   GLUCOSE 170* 184* 155*   CALCIUM 9.5 9.2 9.0   MAGNESIUM 1.8 1.8 1.9   PHOSPHORUS 2.6 2.9 2.6         Lab 02/17/25  0511 02/14/25  0543   TOTAL PROTEIN 6.5  --    ALBUMIN 2.5* 2.5*   GLOBULIN 4.0  --    ALT (SGPT) 16  --    AST (SGOT) 23  --    BILIRUBIN 0.2  --    ALK PHOS 130*  --          Lab 02/16/25  0343   PROBNP 1,131.0*                 Brief Urine Lab Results  (Last result in the past 365 days)        Color   Clarity   Blood   Leuk Est   Nitrite   Protein   CREAT   Urine HCG        02/11/25 1125             14.2         02/11/25 1125 Yellow   Clear   Small (1+)   Trace   Negative   100 mg/dL (2+)                 [x]  Microbiology   Microbiology Results (last 10 days)       Procedure Component Value - Date/Time    Blood Culture - Blood, Arm, Left  [[id number]]  (Normal) Collected: 02/10/25 1014    Lab Status: Final result Specimen: Blood from Arm, Left Updated: 02/15/25 1030     Blood Culture No growth at 5 days    Blood Culture - Blood, Hand, Left [[id number]]  (Normal) Collected: 02/10/25 1014    Lab Status: Final result Specimen: Blood from Hand, Left Updated: 02/15/25 1030     Blood Culture No growth at 5 days    Narrative:      Less than seven (7) mL's of blood was collected.  Insufficient quantity may yield false negative results.          [x]  Radiology  No radiology results for the last 7 days  []  EKG/Telemetry   []  Cardiology/Vascular   []  Pathology  []  Old records  []  Other:    Assessment & Plan   Assessment / Plan     Assessment:  Acute on chronic hypoxemic respiratory failure  Acute on chronic COPD with exacerbation  Bronchiectasis with acute exacerbation  MDR Pseudomonas pneumonia  Perianal abscess with Enterococcus VRE and MDR Citrobacter  Status post diverting colostomy  Blood loss anemia requiring transfusion  Paroxysmal A-fib previously on Eliquis  Pericardial effusion  AOCD and iron deficiency anemia  COPD  CKD-3 b/l Creatinine approximately 2.2  HFpEF 49%, grade 1, follows with dr bishop  Hypertension  IDDM 2  Chronic anxiety  Morbid obesity  BPH, chronic intermittent catheterization use outpatient     Plan  Discussed with surgery, suspect bleeding was from his perianal wound, stability in hemoglobin following resumption of Eliquis on the 18th, continue to monitor  Continues with current wound VAC, wound VAC has been set up as an outpatient per discussion with wound care  Improvement in creatinine, further diuresis per nephrology  Continue Brovana Pulmicort, nebulizers, montelukast, guaifenesin and respiratory hygiene  Continue MOIZ mejias has completed IV antibiotics for Pseudomonas. Apprec pulm assistance  Plan for 30 days on 30 days off, discussed with clinical pharmacist, would require prior authorization for MOIZ nebs, has been in the  hospital long enough at this point that is close to finishing his initial 30-day treatment depending on timing of discharge may need a course outpatient at discharge versus follow-up with pulmonology for new prescription after his 30-day off.  Continue Coreg, holding further losartan given blood pressure  Has received several transfusions, received a dose of EPO on 2/15.  Last transfusion 2/7 received 3 units  Continue p.o. iron  Continue aspirin, statin monitor  Holding Flomax and finasteride as patient with chronic Doyle catheter in currently, outpatient does chronic intermittent catheterization but leaving continuous Doyle for now to help with perianal wound healing.  Given chronic catheterization need to continue these medications at discharge   Continue SSI monitor glucose, glargine, titrate  Continue BuSpar  Continue famotidine  PT/OT  Check a.m. CBC, BMP, magnesium, phosphorus  Continue hospitalization at current level of care     Dispo: Patient wants to go home at time of discharge with home health only and does not want to go back to rehab, will need definitive plan with wound VAC, surgery and stability with treatments prior to discharge.         Discussed with RN.  Discussed with case management    VTE Prophylaxis:  Pharmacologic & mechanical VTE prophylaxis orders are present.        CODE STATUS:   Code Status (Patient has no pulse and is not breathing): CPR (Attempt to Resuscitate)  Medical Interventions (Patient has pulse or is breathing): Full Support      Electronically signed by Kevon Vargas MD, 2/19/2025, 18:29 EST.

## 2025-02-19 NOTE — SIGNIFICANT NOTE
wife able to change ostomy pouch, home wound vac approval pending     During rounds on unit, staff made this nurse aware that wife was at bedside.   Upon arrival wife and Home health nurse both at bedside.   Wife able to perform complete ostomy change with minimal cues.     Patient not participating in care but able to verbalize .   Wife stated she would be comfortable with ostomy care at home   She asked about wound VAC and wound care. Educated that nursing would be doing the wound vac dressing changes and pic shown to patient, wife and HH nurse of wound most recent status and to apply NS moist gauze packing if VAC leaks or has issues at home.   Both wife and HH nurse verbalized understanding.

## 2025-02-19 NOTE — PROGRESS NOTES
Pulmonary / Critical Care Progress Note      Patient Name: Preston Wallis  : 1965  MRN: [id number]  Primary Care Physician:  Kimmy Riley MD  Date of admission: 2025    Subjective   Subjective   Follow-up for acute on chronic respiratory failure hypoxic    No acute events overnight    This morning,  Remains on 2 L  Remains bedridden and fatigued  No chest pain or chest tightness  Denies cough or dyspnea  No fevers or chills    Objective   Objective     Vitals:   Temp:  [98.2 °F (36.8 °C)-99.9 °F (37.7 °C)] 98.4 °F (36.9 °C)  Heart Rate:  [79-98] 86  Resp:  [16-22] 18  BP: (124-155)/(44-61) 145/55  Flow (L/min) (Oxygen Therapy):  [2] 2    Physical Exam   Vital Signs Reviewed   General: Chronically ill-appearing, obese male, Alert, NAD, resting in bed  Chest: Diminished bibasilarly to auscultation, no work of breathing noted on baseline 2 L NC  CV: RRR, no MGR, pulses 2+, equal.  Abd:  Soft, appropriately tender, ND, colostomy bag in place  EXT:  no clubbing, no cyanosis, no edema  Neuro:  A&Ox3, CN grossly intact, no focal deficits.  Skin: No rashes or lesions noted      Result Review    Result Review:  I have personally reviewed the results from the time of this admission to 2025 09:04 EST and agree with these findings:  [x]  Laboratory  [x]  Microbiology  [x]  Radiology  []  EKG/Telemetry   []  Cardiology/Vascular   []  Pathology  []  Old records  []  Other:  Most notable findings include:        Lab 25  0428 25  0423 25  0511 25  0343 02/15/25  0559 25  0543 25  0356   WBC 12.88* 11.16* 11.98*  --   --   --   --    HEMOGLOBIN 8.5* 8.2* 8.4*  --   --   --   --    HEMATOCRIT 26.5* 26.0* 25.9*  --   --   --   --    PLATELETS 703* 653* 575*  --   --   --   --    SODIUM 134* 133* 134* 132*  --  137  --    POTASSIUM 3.8 3.7 4.0 4.0  --  4.2  --    CHLORIDE 98 97* 97* 94*  --  99  --    CO2 26.5 26.8 26.7 24.5  --  29.5*  --    BUN 75* 68* 64* 67*  --  58*  --     CREATININE 2.44* 2.82* 2.63* 2.82*  --  2.62*  --    GLUCOSE 170* 184* 155* 275*  --  135*  --    CALCIUM 9.5 9.2 9.0 8.4*  8.5* 8.9 8.7 8.6   PHOSPHORUS 2.6 2.9 2.6  --   --  3.9  --    TOTAL PROTEIN  --   --  6.5  --   --   --   --    ALBUMIN  --   --  2.5*  --   --  2.5*  --    GLOBULIN  --   --  4.0  --   --   --   --      2/5 chest x-ray with by lateral upper lobe airspace disease consistent bronchiectasis and trace bilateral effusions  Sputum culture with multidrug-resistant Pseudomonas    Assessment & Plan   Assessment / Plan     Active Hospital Problems:  Active Hospital Problems    Diagnosis     Colostomy status     Chronic kidney disease, stage IV (severe)     1. Incision and drainage of posterior perianal abscess 2. Sharp excisional debridement through skin and subcutaneous tissue in the posterior perianal area, 9 x 6 x 1 cm     Perianal abscess     Wound of gluteal cleft     Bronchiectasis with acute exacerbation     Pneumonia due to Pseudomonas species     History of anemia due to chronic kidney disease     Bronchiectasis without complication     COPD exacerbation      Impression:  Acute on chronic hypoxemic respiratory failure  Multidrug-resistant Pseudomonas pneumonia  Bronchiectasis with acute exacerbation  Acute exacerbation of COPD  Paroxysmal atrial fibrillation  Perianal abscess status post drainage  Status post diverting colostomy  Hypomagnesemia  Class II obesity with BMI 35.1    Plan:    Continue supplemental oxygen to keep SpO2 greater 90%.  Patient uses 2 L at baseline  Continue NIPPV nightly and with naps  Status post bronchoscopy 2/3 with airway clearance.  Positive for MDRO Pseudomonas infection  Completed 7 days of Zosyn  Continue MOIZ nebs 1 month on, 1 month off  Renal managing diuretics.  Continue to trend renal function electrolytes.  Continue Singulair  Encourage activity  Holding Eliquis due to anemia  May benefit from rehab  Pulmonary will sign off for now.  He has follow-up  appointment 3/6/2025.  He is to be discharged with MOIZ mejias, 1 month on, 1 month off    VTE Prophylaxis:  Pharmacologic & mechanical VTE prophylaxis orders are present.    CODE STATUS:   Code Status (Patient has no pulse and is not breathing): CPR (Attempt to Resuscitate)  Medical Interventions (Patient has pulse or is breathing): Full Support      Labs, imaging, microbiology, notes and medications personally reviewed  Discussed with primary    Electronically signed by CON Camilo, 02/19/25, 1:00 PM EST.  This patient was seen by both a physician and a NP. I, Deisi Nichole MD, spent >50% of time in accordance with split shared billing. This included personally reviewing all pertinent labs, imaging, microbiology and documentation. Also discussing the case with the patient and any available family, the admitting physician and any available ancillary staff.   Electronically signed by Deisi Nichole MD, 02/19/25, 4:00 PM EST.

## 2025-02-19 NOTE — PLAN OF CARE
Goal Outcome Evaluation:  Plan of Care Reviewed With: patient        Progress: no change  Outcome Evaluation: Patient stated he was having some pain but refused pain medication.  Doyle catheter patency maintained.  Skin care performed per order.  Blood glucose monitored and treated with SSI.  Q2 turns.  Wound vac is still in place.  PM dose of Coreg held per provider due to BP.  Colostomy has small amount of dark brown liqid stool.  Call light is in reach and patient is able to make needs known.

## 2025-02-19 NOTE — PROGRESS NOTES
[facility]     Progress Note    Patient Name: Preston Wallis  : 1965  MRN: [id number]  Primary Care Physician:  Kimmy Riley MD  Date of admission: 2025    Subjective patient is insisting to go home today.  I defer that decision to the primary team.  Overall patient is doing well renal function is stable.  Is little better than yesterday    Review of Systems  All review of systems are negative except as mentioned in subjective complaints.    Objective   Objective     Vitals:   Temp:  [98.2 °F (36.8 °C)-99.9 °F (37.7 °C)] 98.4 °F (36.9 °C)  Heart Rate:  [79-98] 86  Resp:  [16-22] 18  BP: (124-155)/(44-61) 145/55  Flow (L/min) (Oxygen Therapy):  [2] 2  Physical Exam    Constitutional: Awake, alert responsive, conversant, no obvious distress              Psychiatric:  Appropriate affect, cooperative   Neurologic:  Awake alert ,oriented x 3, strength symmetric in all extremities, Cranial Nerves grossly intact to confrontation, speech clear   Eyes:   PERRLA, sclerae anicteric, no conjunctival injection   HEENT:  Moist mucous membranes, no nasal or eye discharge, no throat congestion   Neck:   Supple, no thyromegaly, no lymphadenopathy, trachea midline, no elevated JVD   Respiratory:  Clear to auscultation bilaterally, nonlabored respirations    Cardiovascular: RRR, no murmurs, rubs, or gallops, palpable pedal pulses bilaterally, No bilateral ankle edema   Gastrointestinal: Positive bowel sounds, soft, nontender, nondistended, no organomegaly   Musculoskeletal:  No clubbing or cyanosis to extremities,muscle wasting, joint swelling, muscle weakness             Skin:                      No rashes, bruising, skin ulcers, petechiae or ecchymosis    Result Review    Result Review:  I have personally reviewed the results from the time of this admission to 2025 09:10 EST and agree with these findings:  []  Laboratory  []  Microbiology  []  Radiology  []  EKG/Telemetry   []  Cardiology/Vascular   []   Pathology  []  Old records  []  Other:    Results from last 7 days   Lab Units 02/19/25 0428 02/18/25 0423 02/17/25  0511   WBC 10*3/mm3 12.88* 11.16* 11.98*   HEMOGLOBIN g/dL 8.5* 8.2* 8.4*   PLATELETS 10*3/mm3 703* 653* 575*     Results from last 7 days   Lab Units 02/19/25  0428 02/18/25  0423 02/17/25  0511 02/16/25  0343 02/16/25  0343 02/15/25  0559 02/14/25  0543   SODIUM mmol/L 134* 133* 134*  --  132*  --  137   POTASSIUM mmol/L 3.8 3.7 4.0  --  4.0  --  4.2   CHLORIDE mmol/L 98 97* 97*  --  94*  --  99   CO2 mmol/L 26.5 26.8 26.7  --  24.5  --  29.5*   ANION GAP mmol/L 9.5 9.2 10.3  --  13.5  --  8.5   BUN mg/dL 75* 68* 64*  --  67*  --  58*   CREATININE mg/dL 2.44* 2.82* 2.63*  --  2.82*  --  2.62*   GLUCOSE mg/dL 170* 184* 155*  --  275*  --  135*   EGFR mL/min/1.73 29.7* 25.0* 27.2*  --  25.0*  --  27.3*   CALCIUM mg/dL 9.5 9.2 9.0  --  8.4*  8.5*   < > 8.7   MAGNESIUM mg/dL 1.8 1.8 1.9   < >  --   --   --    BILIRUBIN mg/dL  --   --  0.2  --   --   --   --    ALK PHOS U/L  --   --  130*  --   --   --   --    ALT (SGPT) U/L  --   --  16  --   --   --   --    AST (SGOT) U/L  --   --  23  --   --   --   --     < > = values in this interval not displayed.       Assessment & Plan   Assessment / Plan       Active Hospital Problems:    Active Hospital Problems    Diagnosis  POA    Colostomy status [Z93.3]  Not Applicable    Chronic kidney disease, stage IV (severe) [N18.4]  Unknown     2.5 g of proteinuria  Baseline creatinine 2.3        1. Incision and drainage of posterior perianal abscess 2. Sharp excisional debridement through skin and subcutaneous tissue in the posterior perianal area, 9 x 6 x 1 cm [Z98.890]  Not Applicable    Perianal abscess [K61.0]  Unknown    Wound of gluteal cleft [S32.248Y]  Unknown    Bronchiectasis with acute exacerbation [J47.1]  Unknown    Pneumonia due to Pseudomonas species [J15.1]  Unknown    History of anemia due to chronic kidney disease [N18.9, Z86.2]  Not Applicable      Start Procrit      Bronchiectasis without complication [J47.9]  Unknown    COPD exacerbation [J44.1]  Unknown       Plan:   Patient's renal function is very stable and will follow-up as outpatient       Electronically signed by Nelsy Marx MD, 02/19/25, 9:10 AM EST.

## 2025-02-20 ENCOUNTER — TELEPHONE (OUTPATIENT)
Dept: FAMILY MEDICINE CLINIC | Age: 60
End: 2025-02-20
Payer: COMMERCIAL

## 2025-02-20 ENCOUNTER — PATIENT OUTREACH (OUTPATIENT)
Dept: CASE MANAGEMENT | Facility: OTHER | Age: 60
End: 2025-02-20
Payer: COMMERCIAL

## 2025-02-20 ENCOUNTER — READMISSION MANAGEMENT (OUTPATIENT)
Dept: CALL CENTER | Facility: HOSPITAL | Age: 60
End: 2025-02-20
Payer: COMMERCIAL

## 2025-02-20 VITALS
OXYGEN SATURATION: 100 % | WEIGHT: 296.74 LBS | RESPIRATION RATE: 16 BRPM | HEART RATE: 83 BPM | HEIGHT: 69 IN | TEMPERATURE: 98.9 F | BODY MASS INDEX: 43.95 KG/M2 | SYSTOLIC BLOOD PRESSURE: 124 MMHG | DIASTOLIC BLOOD PRESSURE: 45 MMHG

## 2025-02-20 DIAGNOSIS — J96.11 CHRONIC RESPIRATORY FAILURE WITH HYPOXIA AND HYPERCAPNIA: Primary | ICD-10-CM

## 2025-02-20 DIAGNOSIS — J96.12 CHRONIC RESPIRATORY FAILURE WITH HYPOXIA AND HYPERCAPNIA: Primary | ICD-10-CM

## 2025-02-20 LAB
ANION GAP SERPL CALCULATED.3IONS-SCNC: 8.8 MMOL/L (ref 5–15)
BASOPHILS # BLD AUTO: 0.1 10*3/MM3 (ref 0–0.2)
BASOPHILS NFR BLD AUTO: 0.8 % (ref 0–1.5)
BUN SERPL-MCNC: 81 MG/DL (ref 6–20)
BUN/CREAT SERPL: 31.3 (ref 7–25)
CALCIUM SPEC-SCNC: 9.7 MG/DL (ref 8.6–10.5)
CHLORIDE SERPL-SCNC: 100 MMOL/L (ref 98–107)
CO2 SERPL-SCNC: 26.2 MMOL/L (ref 22–29)
CREAT SERPL-MCNC: 2.59 MG/DL (ref 0.76–1.27)
DEPRECATED RDW RBC AUTO: 50.4 FL (ref 37–54)
EGFRCR SERPLBLD CKD-EPI 2021: 27.7 ML/MIN/1.73
EOSINOPHIL # BLD AUTO: 1.33 10*3/MM3 (ref 0–0.4)
EOSINOPHIL NFR BLD AUTO: 10.7 % (ref 0.3–6.2)
ERYTHROCYTE [DISTWIDTH] IN BLOOD BY AUTOMATED COUNT: 15.5 % (ref 12.3–15.4)
GLUCOSE BLDC GLUCOMTR-MCNC: 175 MG/DL (ref 70–99)
GLUCOSE BLDC GLUCOMTR-MCNC: 206 MG/DL (ref 70–99)
GLUCOSE BLDC GLUCOMTR-MCNC: 245 MG/DL (ref 70–99)
GLUCOSE SERPL-MCNC: 161 MG/DL (ref 65–99)
HCT VFR BLD AUTO: 26 % (ref 37.5–51)
HGB BLD-MCNC: 8.2 G/DL (ref 13–17.7)
IMM GRANULOCYTES # BLD AUTO: 0.17 10*3/MM3 (ref 0–0.05)
IMM GRANULOCYTES NFR BLD AUTO: 1.4 % (ref 0–0.5)
LYMPHOCYTES # BLD AUTO: 2.71 10*3/MM3 (ref 0.7–3.1)
LYMPHOCYTES NFR BLD AUTO: 21.8 % (ref 19.6–45.3)
MAGNESIUM SERPL-MCNC: 1.9 MG/DL (ref 1.6–2.6)
MCH RBC QN AUTO: 28.4 PG (ref 26.6–33)
MCHC RBC AUTO-ENTMCNC: 31.5 G/DL (ref 31.5–35.7)
MCV RBC AUTO: 90 FL (ref 79–97)
MONOCYTES # BLD AUTO: 2.17 10*3/MM3 (ref 0.1–0.9)
MONOCYTES NFR BLD AUTO: 17.4 % (ref 5–12)
NEUTROPHILS NFR BLD AUTO: 47.9 % (ref 42.7–76)
NEUTROPHILS NFR BLD AUTO: 5.97 10*3/MM3 (ref 1.7–7)
NRBC BLD AUTO-RTO: 0.4 /100 WBC (ref 0–0.2)
PHOSPHATE SERPL-MCNC: 2.5 MG/DL (ref 2.5–4.5)
PLATELET # BLD AUTO: 726 10*3/MM3 (ref 140–450)
PMV BLD AUTO: 8.4 FL (ref 6–12)
POTASSIUM SERPL-SCNC: 3.7 MMOL/L (ref 3.5–5.2)
RBC # BLD AUTO: 2.89 10*6/MM3 (ref 4.14–5.8)
SODIUM SERPL-SCNC: 135 MMOL/L (ref 136–145)
WBC NRBC COR # BLD AUTO: 12.45 10*3/MM3 (ref 3.4–10.8)

## 2025-02-20 PROCEDURE — 84100 ASSAY OF PHOSPHORUS: CPT | Performed by: INTERNAL MEDICINE

## 2025-02-20 PROCEDURE — 94799 UNLISTED PULMONARY SVC/PX: CPT

## 2025-02-20 PROCEDURE — 63710000001 INSULIN GLARGINE PER 5 UNITS: Performed by: INTERNAL MEDICINE

## 2025-02-20 PROCEDURE — 85025 COMPLETE CBC W/AUTO DIFF WBC: CPT | Performed by: INTERNAL MEDICINE

## 2025-02-20 PROCEDURE — 82948 REAGENT STRIP/BLOOD GLUCOSE: CPT

## 2025-02-20 PROCEDURE — 63710000001 INSULIN LISPRO (HUMAN) PER 5 UNITS: Performed by: STUDENT IN AN ORGANIZED HEALTH CARE EDUCATION/TRAINING PROGRAM

## 2025-02-20 PROCEDURE — 94761 N-INVAS EAR/PLS OXIMETRY MLT: CPT

## 2025-02-20 PROCEDURE — 83735 ASSAY OF MAGNESIUM: CPT | Performed by: INTERNAL MEDICINE

## 2025-02-20 PROCEDURE — 82948 REAGENT STRIP/BLOOD GLUCOSE: CPT | Performed by: STUDENT IN AN ORGANIZED HEALTH CARE EDUCATION/TRAINING PROGRAM

## 2025-02-20 PROCEDURE — 25010000002 HEPARIN LOCK FLUSH PER 10 UNITS: Performed by: STUDENT IN AN ORGANIZED HEALTH CARE EDUCATION/TRAINING PROGRAM

## 2025-02-20 PROCEDURE — 63710000001 REVEFENACIN 175 MCG/3ML SOLUTION: Performed by: STUDENT IN AN ORGANIZED HEALTH CARE EDUCATION/TRAINING PROGRAM

## 2025-02-20 PROCEDURE — 99239 HOSP IP/OBS DSCHRG MGMT >30: CPT | Performed by: INTERNAL MEDICINE

## 2025-02-20 PROCEDURE — 80048 BASIC METABOLIC PNL TOTAL CA: CPT | Performed by: INTERNAL MEDICINE

## 2025-02-20 RX ORDER — INSULIN LISPRO 100 [IU]/ML
5 INJECTION, SOLUTION INTRAVENOUS; SUBCUTANEOUS
Qty: 15 ML | Refills: 0 | Status: SHIPPED | OUTPATIENT
Start: 2025-02-20

## 2025-02-20 RX ORDER — INSULIN DETEMIR 100 [IU]/ML
5 INJECTION, SOLUTION SUBCUTANEOUS NIGHTLY
Qty: 15 ML | Refills: 1 | Status: SHIPPED | OUTPATIENT
Start: 2025-02-20 | End: 2025-08-19

## 2025-02-20 RX ADMIN — COLLAGENASE SANTYL 1 APPLICATION: 250 OINTMENT TOPICAL at 10:54

## 2025-02-20 RX ADMIN — REVEFENACIN 175 MCG: 175 SOLUTION RESPIRATORY (INHALATION) at 07:35

## 2025-02-20 RX ADMIN — ASPIRIN 81 MG: 81 TABLET, COATED ORAL at 08:51

## 2025-02-20 RX ADMIN — FERROUS SULFATE TAB 325 MG (65 MG ELEMENTAL FE) 325 MG: 325 (65 FE) TAB at 08:51

## 2025-02-20 RX ADMIN — INSULIN GLARGINE 5 UNITS: 100 INJECTION, SOLUTION SUBCUTANEOUS at 08:50

## 2025-02-20 RX ADMIN — INSULIN LISPRO 2 UNITS: 100 INJECTION, SOLUTION INTRAVENOUS; SUBCUTANEOUS at 08:50

## 2025-02-20 RX ADMIN — Medication 10 ML: at 08:51

## 2025-02-20 RX ADMIN — ARFORMOTEROL TARTRATE 15 MCG: 15 SOLUTION RESPIRATORY (INHALATION) at 07:34

## 2025-02-20 RX ADMIN — GUAIFENESIN 600 MG: 600 TABLET ORAL at 08:51

## 2025-02-20 RX ADMIN — TOBRAMYCIN 300 MG: 300 SOLUTION RESPIRATORY (INHALATION) at 07:35

## 2025-02-20 RX ADMIN — HEPARIN 500 UNITS: 100 SYRINGE at 16:20

## 2025-02-20 RX ADMIN — BUSPIRONE HYDROCHLORIDE 15 MG: 5 TABLET ORAL at 08:51

## 2025-02-20 RX ADMIN — APIXABAN 5 MG: 5 TABLET, FILM COATED ORAL at 08:51

## 2025-02-20 RX ADMIN — INSULIN LISPRO 4 UNITS: 100 INJECTION, SOLUTION INTRAVENOUS; SUBCUTANEOUS at 13:08

## 2025-02-20 RX ADMIN — BUDESONIDE 0.5 MG: 0.5 INHALANT RESPIRATORY (INHALATION) at 07:34

## 2025-02-20 RX ADMIN — FAMOTIDINE 20 MG: 20 TABLET ORAL at 08:51

## 2025-02-20 NOTE — PROGRESS NOTES
[facility]     Progress Note    Patient Name: Preston Wallis  : 1965  MRN: [id number]  Primary Care Physician:  Kimmy Riley MD  Date of admission: 2025    Subjective   Patient is feeling fine he did not express any new concerns or issues.  Hemodynamically stable renal function stable  Review of Systems  All review of systems are negative except as mentioned in subjective complaints.    Objective   Objective     Vitals:   Temp:  [97.5 °F (36.4 °C)-98.9 °F (37.2 °C)] 98.9 °F (37.2 °C)  Heart Rate:  [] 83  Resp:  [16-18] 16  BP: (123-151)/(41-46) 124/45  Flow (L/min) (Oxygen Therapy):  [2] 2  Physical Exam    Constitutional: Awake, alert responsive, conversant, no obvious distress              Psychiatric:  Appropriate affect, cooperative   Neurologic:  Awake alert ,oriented x 3, strength symmetric in all extremities, Cranial Nerves grossly intact to confrontation, speech clear   Eyes:   PERRLA, sclerae anicteric, no conjunctival injection   HEENT:  Moist mucous membranes, no nasal or eye discharge, no throat congestion   Neck:   Supple, no thyromegaly, no lymphadenopathy, trachea midline, no elevated JVD   Respiratory:  Clear to auscultation bilaterally, nonlabored respirations    Cardiovascular: RRR, no murmurs, rubs, or gallops, palpable pedal pulses bilaterally, No bilateral ankle edema   Gastrointestinal: Positive bowel sounds, soft, nontender, nondistended, no organomegaly   Musculoskeletal:  No clubbing or cyanosis to extremities,muscle wasting, joint swelling, muscle weakness             Skin:                      No rashes, bruising, skin ulcers, petechiae or ecchymosis    Result Review    Result Review:  I have personally reviewed the results from the time of this admission to 2025 09:40 EST and agree with these findings:  []  Laboratory  []  Microbiology  []  Radiology  []  EKG/Telemetry   []  Cardiology/Vascular   []  Pathology  []  Old records  []  Other:    Results from  last 7 days   Lab Units 02/20/25 0448 02/19/25 0428 02/18/25 0423 02/17/25  0511   WBC 10*3/mm3 12.45* 12.88* 11.16* 11.98*   HEMOGLOBIN g/dL 8.2* 8.5* 8.2* 8.4*   PLATELETS 10*3/mm3 726* 703* 653* 575*     Results from last 7 days   Lab Units 02/20/25 0448 02/19/25 0428 02/18/25 0423 02/17/25  0511 02/16/25  0343 02/16/25 0343 02/15/25  0559 02/14/25  0543   SODIUM mmol/L 135* 134* 133* 134*  --  132*  --  137   POTASSIUM mmol/L 3.7 3.8 3.7 4.0  --  4.0  --  4.2   CHLORIDE mmol/L 100 98 97* 97*  --  94*  --  99   CO2 mmol/L 26.2 26.5 26.8 26.7  --  24.5  --  29.5*   ANION GAP mmol/L 8.8 9.5 9.2 10.3  --  13.5  --  8.5   BUN mg/dL 81* 75* 68* 64*  --  67*  --  58*   CREATININE mg/dL 2.59* 2.44* 2.82* 2.63*  --  2.82*  --  2.62*   GLUCOSE mg/dL 161* 170* 184* 155*  --  275*  --  135*   EGFR mL/min/1.73 27.7* 29.7* 25.0* 27.2*  --  25.0*  --  27.3*   CALCIUM mg/dL 9.7 9.5 9.2 9.0  --  8.4*  8.5*   < > 8.7   MAGNESIUM mg/dL 1.9 1.8 1.8 1.9   < >  --   --   --    BILIRUBIN mg/dL  --   --   --  0.2  --   --   --   --    ALK PHOS U/L  --   --   --  130*  --   --   --   --    ALT (SGPT) U/L  --   --   --  16  --   --   --   --    AST (SGOT) U/L  --   --   --  23  --   --   --   --     < > = values in this interval not displayed.       Assessment & Plan   Assessment / Plan       Active Hospital Problems:    Active Hospital Problems    Diagnosis  POA    Colostomy status [Z93.3]  Not Applicable    Chronic kidney disease, stage IV (severe) [N18.4]  Unknown     2.5 g of proteinuria  Baseline creatinine 2.3        1. Incision and drainage of posterior perianal abscess 2. Sharp excisional debridement through skin and subcutaneous tissue in the posterior perianal area, 9 x 6 x 1 cm [Z98.890]  Not Applicable    Perianal abscess [K61.0]  Unknown    Wound of gluteal cleft [S30.233U]  Unknown    Bronchiectasis with acute exacerbation [J47.1]  Unknown    Pneumonia due to Pseudomonas species [J15.1]  Unknown    History of anemia  due to chronic kidney disease [N18.9, Z86.2]  Not Applicable     Start Procrit      Bronchiectasis without complication [J47.9]  Unknown    COPD exacerbation [J44.1]  Unknown       Plan:   Continue present care and if patient gets discharged we will follow-up in [place] [facility]       Electronically signed by Nelsy Marx MD, 02/20/25, 9:40 AM EST.

## 2025-02-20 NOTE — OUTREACH NOTE
Prep Survey      Flowsheet Row Responses   [employer] facility patient discharged from? Non-BH   Is LACE score < 7 ? Non-BH Discharge   Eligibility TCM   Discharge Disposition Home-Health Care Svc   Discharge diagnosis Acute hypoxemic respiratory failure   Does the patient have one of the following disease processes/diagnoses(primary or secondary)? Other   Does the patient have Home health ordered? Yes   What is the Home health agency?  [facility]   Is there a DME ordered? Yes   What DME was ordered? [employer]   Prep survey completed? Yes            ANA CASSIDY - Registered Nurse

## 2025-02-20 NOTE — TELEPHONE ENCOUNTER
Pt is being discharged from [facility] on 02/20/2025, He needs a follow up in less than a week per hospital. He was in the hospital for multipe things. He's been in the hospital since 01/23/2025. The reasons are COPD exacerbation, anemia, kidney disease perianal abscess

## 2025-02-20 NOTE — PLAN OF CARE
Goal Outcome Evaluation:  Plan of Care Reviewed With: patient        Progress: improving  Outcome Evaluation: No complaints of pain this shift.  Wound vac present and seal is still intact.  Colostomy is producing creamy dark brown stool.  PM dose of Coreg held due to BP.  Doyle patency maintained.  Call light is in reach and patient is able to make needs known. Positive sepsis screen and provider was notified.

## 2025-02-20 NOTE — OUTREACH NOTE
"AMBULATORY CASE MANAGEMENT NOTE    Names and Relationships of Patient/Support Persons: Contact: Preston Wallis \"Gómez\"; Relationship: Self -     Reached out to Elizabeth to let her know the date and time of Gómez's appointment next week with PCP.   She said that she is working or would like help with a waiver program to get help at home.  She did some extensive training at the hospital to help with the colostomy bag, she feels comfortable.    Will place referral for social work with waiver.    I have asked her to call me in the morning when she gets the discharge paper work to review any changes in medication.      Tara GOMEZ  Ambulatory Case Management    2/20/2025, 13:53 EST  "

## 2025-02-20 NOTE — SIGNIFICANT NOTE
Wound Eval / Progress Noted    YAIMA Stockton     Patient Name: Preston Wallis  : 1965  MRN: [id number]  Today's Date: 2025                 Admit Date: 2025    Visit Dx:    ICD-10-CM ICD-9-CM   1. Pneumonia due to Pseudomonas species, unspecified laterality, unspecified part of lung  J15.1 482.1   2. Urinary tract infection associated with indwelling urethral catheter, initial encounter  T83.511A 996.64    N39.0 599.0   3. Perianal abscess  K61.0 566   4. COPD exacerbation  J44.1 491.21   5. Bronchiectasis without complication  J47.9 494.0   6. Allergy, initial encounter  T78.40XA 995.3   7. Acute hypoxic on chronic hypercapnic respiratory failure  J96.01 518.84    J96.12    8. Tobacco abuse, in remission  F17.201 305.1   9. Chronic dyspnea  R06.09 786.09   10. Obstructive sleep apnea  G47.33 327.23   11. Chronic respiratory failure with hypoxia and hypercapnia  J96.11 518.83    J96.12 799.02     786.09   12. Chronic obstructive pulmonary disease, unspecified COPD type  J44.9 496   13. Wound of gluteal cleft, unspecified laterality, subsequent encounter  S31.809D V58.89     877.0   14. Pneumonia of both lower lobes due to Pneumocystis jirovecii  B59 136.3   15. Bacterial pneumonia  J15.9 482.9   16. Bronchiectasis with acute exacerbation  J47.1 494.1   17. Pneumonia of both lungs due to Pseudomonas species, unspecified part of lung  J15.1 482.1   18. Difficulty walking  R26.2 719.7         COPD exacerbation    Bronchiectasis without complication    History of anemia due to chronic kidney disease    Pneumonia due to Pseudomonas species    Bronchiectasis with acute exacerbation    Perianal abscess    1. Incision and drainage of posterior perianal abscess 2. Sharp excisional debridement through skin and subcutaneous tissue in the posterior perianal area, 9 x 6 x 1 cm    Wound of gluteal cleft    Chronic kidney disease, stage IV (severe)    Colostomy status        Past Medical History:   Diagnosis Date     "1. Incision and drainage of posterior perianal abscess 2. Sharp excisional debridement through skin and subcutaneous tissue in the posterior perianal area, 9 x 6 x 1 cm 01/26/2025    Age-related cognitive decline     Allergic contact dermatitis     Allergies     Anemia     Bedbound     11/2023 \"MY LEG MUSCLES STOPPED WORKING\"    Bronchiectasis with acute lower respiratory infection     Charcot foot due to diabetes mellitus 09/10/2013    Chronic diastolic (congestive) heart failure     Chronic kidney disease     Chronic respiratory failure with hypoxia     Closed supracondylar fracture of femur 01/12/2022    COPD (chronic obstructive pulmonary disease)     Deep vein thrombosis (DVT) of lower extremity associated with air travel 01/13/2023    Dependence on supplemental oxygen     Eczema     Erectile dysfunction     due to organic reasons    Essential (primary) hypertension     Fracture     closed fracture of other tarsal and metatarsal bones    Fracture of proximal humerus 01/13/2023    GERD without esophagitis     High risk medication use     Hypercholesteremia     Hypomagnesemia     Infected stasis ulcer of left lower extremity 01/13/2023    Insomnia     Low back pain     Major depressive disorder     Morbid (severe) obesity due to excess calories     MRSA pneumonia     Muscle weakness     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 without evidence of necrosis     Obstructive sleep apnea (adult) (pediatric)     On home O2     REPORTS WEARING 2L/NC AAT    Other forms of dyspnea     Other long term (current) drug therapy     Other specified noninfective gastroenteritis and colitis     Other spondylosis, lumbar region     Pain in both knees     Paroxysmal atrial fibrillation     Peripheral neuropathy     attributed to type 2 diabetes    Pneumonia, unspecified organism     Polyneuropathy     Rash and other nonspecific skin eruption     Self-catheterizes urinary bladder     EVERY 4 " "HOURS    Smoking     \"SOMETIMES\"    Syncope and collapse     Tachycardia     Tinnitus 01/13/2023    Type 1 diabetes mellitus with diabetic chronic kidney disease     Type 2 diabetes mellitus     Unspecified fall, initial encounter     Urinary retention         Past Surgical History:   Procedure Laterality Date    BRONCHOSCOPY N/A 1/28/2025    Procedure: BRONCHOSCOPY: BAL: insertion of lighted instrument to view inside the lung;  Surgeon: Walter Nicole DO;  Location: [facility] MAIN OR;  Service: Pulmonary;  Laterality: N/A;    BRONCHOSCOPY N/A 2/3/2025    Procedure: BRONCHOSCOPY WITH BRONCHOALVEOLAR LAVAGE, POSSIBLE BIOPSY, BRUSHING, WASHING, AIRWAY INSPECTION: insertion of lighted instrument to view inside the lung;  Surgeon: Chadd Swan MD;  Location: [facility] MAIN OR;  Service: Pulmonary;  Laterality: N/A;    CARDIAC CATHETERIZATION Left 8/15/2024    Procedure: Carbon dioxide aortogram with left leg angiogram, possible angioplasty or stenting;  Surgeon: Moshe Willson MD;  Location: [facility] CATH INVASIVE LOCATION;  Service: Vascular;  Laterality: Left;    CHOLECYSTECTOMY      COLOSTOMY N/A 2/6/2025    Procedure: COLOSTOMY LAPAROSCOPIC; plain text: creation of colostomy using small incisions;  Surgeon: Emerson Canada MD;  Location: [facility] OR [facility];  Service: General;  Laterality: N/A;    CYSTOSCOPY      FEMUR SURGERY Left     Shravan placed    INCISION AND DRAINAGE ABSCESS N/A 1/26/2025    Procedure: INCISION AND DRAINAGE ABSCESS; plain text: incision and drain pus from buttocks wound;  Surgeon: Emerson Canada MD;  Location: [facility] OR [facility];  Service: General;  Laterality: N/A;    KNEE SURGERY Left     OTHER SURGICAL HISTORY Left     venous port, REMOVED    PORTACATH PLACEMENT Right     TIBIAL PLATEAU OPEN REDUCTION INTERNAL FIXATION Left 12/22/2023    Procedure: TIBIAL PLATEAU OPEN REDUCTION INTERNAL FIXATION;  Surgeon: Hugo Kline MD;  Location: [facility] OR;  Service: Orthopedics;  " Laterality: Left;    TONSILLECTOMY AND ADENOIDECTOMY           Physical Assessment:  Wound 02/16/24 1700 Left posterior foot Pressure Injury (Active)   Wound Image   02/19/25 1426   Pressure Injury Stage U 02/19/25 1426   Dressing Appearance dry;intact 02/19/25 1426   Closure None 02/19/25 1426   Base moist;yellow;slough;red;dry;black;eschar 02/19/25 1426   Black (%), Wound Tissue Color 40 02/19/25 1426   Red (%), Wound Tissue Color 10 02/19/25 1426   Yellow (%), Wound Tissue Color 50 02/19/25 1426   Periwound dry;pale white;pink 02/19/25 1426   Periwound Temperature warm 02/19/25 1426   Periwound Skin Turgor soft 02/19/25 1426   Edges open 02/19/25 1426   Wound Length (cm) 3.4 cm 02/19/25 1426   Wound Width (cm) 3.2 cm 02/19/25 1426   Wound Depth (cm) 0.2 cm 02/19/25 1426   Wound Surface Area (cm^2) 10.88 cm^2 02/19/25 1426   Wound Volume (cm^3) 2.176 cm^3 02/19/25 1426   Drainage Characteristics/Odor serosanguineous 02/19/25 1426   Drainage Amount scant 02/19/25 1426   Care, Wound cleansed with;sterile normal saline;collagenase agent applied 02/19/25 1426   Dressing Care dressing removed;dressing applied;other (see comments);silicone border foam 02/19/25 1426   Periwound Care absorptive dressing applied 02/19/25 1426       Wound 01/23/25 2330 coccyx pressure injury (Active)   Wound Image    02/19/25 1426   Pressure Injury Stage 3 02/19/25 1426   Dressing Appearance dry;intact 02/19/25 1426   Closure None 02/19/25 1426   Base moist;yellow;slough;red 02/19/25 1426   Red (%), Wound Tissue Color 60 02/19/25 1426   Yellow (%), Wound Tissue Color 40 02/19/25 1426   Periwound dry;pink 02/19/25 1426   Periwound Temperature warm 02/19/25 1426   Periwound Skin Turgor soft 02/19/25 1426   Edges open 02/19/25 1426   Wound Length (cm) 7.3 cm 02/19/25 1426   Wound Width (cm) 6 cm 02/19/25 1426   Wound Depth (cm) 1.5 cm 02/19/25 1426   Wound Surface Area (cm^2) 43.8 cm^2 02/19/25 1426   Wound Volume (cm^3) 65.7 cm^3 02/19/25  1426   Undermining [Depth (cm)/Location] 0.9cm / 12 o'clock ; 1.3cm / 5 to 6 o'clock 02/19/25 1426   Drainage Characteristics/Odor serosanguineous 02/19/25 1426   Drainage Amount scant 02/19/25 1426   Care, Wound cleansed with;irrigated with;sterile normal saline;negative pressure wound therapy 02/19/25 1426   Dressing Care dressing removed;dressing applied;foam;transparent film;hydrocolloid;skin barrier agent applied 02/19/25 1426   Periwound Care dry periwound area maintained;hydrocolloid barrier applied 02/19/25 1426       Wound 02/04/25 1200 Left anterior thigh unspecified (Active)   Wound Image   02/19/25 1426   Dressing Appearance open to air 02/19/25 1426   Closure None 02/19/25 1426   Base moist;red 02/19/25 1426   Red (%), Wound Tissue Color 100 02/19/25 1426   Periwound dry;pink 02/19/25 1426   Periwound Temperature warm 02/19/25 1426   Periwound Skin Turgor soft 02/19/25 1426   Edges open 02/19/25 1426   Wound Length (cm) 1.1 cm 02/19/25 1426   Wound Width (cm) 0.6 cm 02/19/25 1426   Wound Depth (cm) 0.1 cm 02/19/25 1426   Wound Surface Area (cm^2) 0.66 cm^2 02/19/25 1426   Wound Volume (cm^3) 0.066 cm^3 02/19/25 1426   Drainage Characteristics/Odor serosanguineous 02/19/25 1426   Drainage Amount scant 02/19/25 1426   Care, Wound cleansed with;sterile normal saline 02/19/25 1426   Dressing Care dressing applied;non-adherent;petroleum-based;gauze;silicone border foam 02/19/25 1426   Periwound Care absorptive dressing applied 02/19/25 1426       Wound 02/11/25 1238 Left distal plantar unspecified (Active)   Wound Image   02/19/25 1426   Dressing Appearance dry;intact 02/19/25 1426   Closure None 02/19/25 1426   Base dry;blanchable;red 02/19/25 1426   Periwound dry;pink 02/19/25 1426   Periwound Temperature warm 02/19/25 1426   Periwound Skin Turgor soft 02/19/25 1426   Edges rolled/closed 02/19/25 1426   Drainage Amount none 02/19/25 1426   Care, Wound cleansed with;sterile normal saline 02/19/25 1426  "  Dressing Care dressing removed;dressing applied;non-adherent;petroleum-based;gauze;silicone border foam 02/19/25 1426   Periwound Care dry periwound area maintained 02/19/25 1426       NPWT (Negative Pressure Wound Therapy) 02/14/25 1530 Francisca-anal (Active)   Therapy Setting continuous therapy 02/19/25 1426   Dressing foam, black;foam, white;transparent dressing 02/19/25 1426   Pressure Setting 125 mmHg 02/19/25 1426   Sponges Inserted 2;other (see comments) 02/19/25 1426   Sponges Removed 2;other (see comments) 02/19/25 1426   Finger sweep complete Yes 02/19/25 1426 02/19/25 1426   Colostomy   Placement date: If unknown, DO NOT use \"Add Comment\" note/Placement time: If unknown, DO NOT use \"Add Comment\" note: 02/06/25 1322   Hand Hygiene Completed: Yes   Stomal Appliance 1 piece;Clean;Dry;Intact;Drainable   Stoma Appearance irregular;moist;dusky;black;red;bridge in place;protruding above skin level   Peristomal Assessment AMINA   Stoma Function stool;flatus   Stool Color brown   Stool Consistency loose;formed   Treatment Placement checked   Output (mL) 100 mL      Wound Check / Follow-up:  Patient seen today for ostomy education, wound VAC dressing change, and wound follow up.  Patient is awake, alert, and oriented. Patient underwent an incision and drainage of perianal abscess on 1/26/2025. Patient underwent diverting laprascopic colostomy placement on 2/6/25 to assist with healing of his perianal abscess. Patient reports he has been bedridden for approximately 2 to 3 years due to the pressure injury to his left heel. Reviewed ostomy education with patient to include managing ostomy, diet management with ostomy, and when to perform a pouch change; questions encouraged and answered.      Loop colostomy noted to lower aspect of abdomen. Pouching system is intact with no signs of lifting or leaking. 100ml of loose and formed brown stool emptied from pouching system. Stoma is noted through pouching system to be " irregular, moist, dusky, black, red, and protruding above skin level, with an ostomy bar in place.  Unable to visualize peristomal tissue. No signs of bleeding noted to stoma or within pouching system at this time. Recommending to continue diligent ostomy care.       Stage III pressure injury noted to the perianal region post incision and drainage. Wound VAC is in place at time of assessment with no leaks or alarms noted. Wound VAC dressing removed with adhesive remover spray. One piece of black foam and one piece of white foam removed from wound base. One piece of back foam utilized for bridge also removed, for a total of 3 pieces of foam. Wound base presents with moist red tissue and yellow slough, with an area of loosening dark red coagulated blood at 5 o'clock. Periwound tissue is dry with blanchable pink/redness. Cleansed and irrigated with normal saline and gauze, blotted dry. Skin prep applied to periwound tissue. Periwound tissue was draped with transparent film. Strips of hydrocolloid dressing were applied to wound edges from 5 to 6 o'clock and 6 to 7 o'clock, and a piece of Vanita's ring was applied at 6 o'clock to assist with seal. One piece of white foam was placed within wound base near 6 o'clock. Remaining wound base was then lightly filled with one piece of black foam. Foams were draped with transparent film. A strip of hydrocolloid was applied at 12 o'clock to assist with seal protection. A bridge was made with a second piece of black foam and draped with transparent film. Dressing was connected to wound VAC suction at 125 mmHg.  Seal obtained with no signs of lifting or leaking. Recommending to continue wound VAC therapy with dressing changes three times a week. Recommending quality skin care and hygiene to skin surrounding wound VAC dressing with application of blue top moisture barrier 4 times a day and as needed for incontinence.  Implement every 2 hour turns and offload at all times.  Keep  patient clean, dry, and free from all moisture.      Suspected traumatic injury to left anterior thigh.  Wound base presents with moist red tissue.  Periwound tissue is dry and pink.  Cleansed with normal saline and gauze, blotted dry.  Recommending every 3-day dressing changes with non-adherent, petroleum-based gauze and silicone border dressing securement.       Unstageable pressure injury to left heel.  Wound base is primarily covered with tightly adhered yellow slough and dry black eschar, with moist red tissue noted within. Amount of eschar has decreased with moist yellow slough now being present where eschar had been. Recommending to continue daily dressing changes with a nickel thick layer of Santyl being applied over wound base, covered with normal saline moist gauze, and secured with a silicone border dressing. Keep heel floated at all times with use of heel offloading boot or cushion.     Left plantar forefoot presents with an area of dry blanchable red scar tissue, with dry pink tissue surrounding.  Patient reports previous wound at this site.  Cleansed with normal saline and gauze, blotted dry.  Recommending to continue every 3-day dressing changes with non-adherent, petroleum-based gauze and silicone border dressing securement to protect scar tissue.       Impression: Surgical incision with delayed closure/Stage III pressure injury to perianal region with initiation of wound VAC therapy. Suspected traumatic injury to left thigh. Unstageable pressure injury to left heel. Left plantar forefoot with blanchable red scar tissue.  Loop Colostomy.     Short term goals: Regain skin integrity, skin protection, moisture prevention, pressure reduction, quality skin care and hygiene, negative pressure wound VAC therapy, every 3-day dressing change, daily dressing changes, colostomy management.     Amparo Novoa RN    2/19/2025    20:05 EST

## 2025-02-20 NOTE — DISCHARGE SUMMARY
[facility]         HOSPITALIST  DISCHARGE SUMMARY    Patient Name: Preston Wallis  : 1965  MRN: [id number]    Date of Admission: 2025  Date of Discharge:  2023  Primary Care Physician: Kimmy Riley MD    Consults:  Nephrology  General Surgery  Cardiology  Pulmonary critical care    Active and Resolved Hospital Problems:  Acute on chronic hypoxemic respiratory failure  Acute on chronic COPD with exacerbation  Bronchiectasis with acute exacerbation  MDR Pseudomonas pneumonia  Perianal abscess with Enterococcus VRE and MDR Citrobacter  Status post diverting colostomy  Blood loss anemia requiring transfusion  Paroxysmal A-fib previously on Eliquis  Pericardial effusion  AOCD and iron deficiency anemia  COPD  CKD-3 b/l Creatinine approximately 2.2  HFpEF 49%, grade 1, follows with dr bishop  Hypertension  IDDM 2  Chronic anxiety  Morbid obesity  BPH, chronic intermittent catheterization use outpatient      Hospital Course     Hospital Course:  Preston Wallis is a 59 y.o. male with medical history of MDR Pseudomonas pneumonia recently discharged  on IV ertapenem, COPD, CHF, who presented with shortness of breath.  Initial CT imaging showing a new spiculated left lower lobe nodule, bronchiectasis and enlarging pericardial effusion.  Pulmonary and cardiology is consulted.  TTE only showed a small pericardial effusion, EF 49%, grade 1 diastolic dysfunction.  Wound care noted perianal tissue necrosis, CT abdomen was obtained showed a 4.5 x 1.8 x 3.5 posterior anal abscess, general surgery consulted and patient underwent I&D on .  Underwent bronchoscopy on  still showing MDR Pseudomonas treated with IV meropenem and patient continued with MOIZ nebs.  Perianal culture with Enterococcus VRE and MDR Citrobacter treated inpatient with linezolid and Zosyn.  To help with wound healing patient underwent laparoscopic diverting loop colostomy along with repair of chronically  incarcerated 3 cm umbilical hernia on 2/6/2025.  Has required several transfusions during hospitalization as recently 3 units on 2/7/2025.  Surgery suspect bleeding was from his perianal abscess.  Patient eventually transition back to his home Eliquis with stability in hemoglobin.  Original recommendations were for SNF discharge given patient's several wounds, however patient really wanting home with home health.  Wound VAC was transition to home device, wound care will follow-up as an outpatient.  Patient will need to follow-up with general surgery as an outpatient as well as nephrology.  On discharge several patient's home cardiac medications were held given soft blood pressures in the hospital.  Patient's diuretics also held, improvement in renal function prior to discharge.  Given patient's Pseudomonas pneumonia he needs to remain on MOIZ nebs 30 days on 30 days off cycles.  Patient completed a 30-day cycle inpatient, will need to follow-up outpatient with pulmonology clinic for new prescription of MOIZ nebs.  Doyle catheter maintained while inpatient, however discontinued on discharge, patient uses intermittent catheterization at home, has medical supplies at home.  Patient seen on date of discharge, clinically and hemodynamically stable.  Patient provided concerning signs and symptoms prompting immediate medical attention, patient understanding and agreeable       DISCHARGE Follow Up Recommendations for labs and diagnostics:   Follow-up primary care physician as soon as possible  Follow-up with pulmonologist in clinic, will remain on MOIZ nebs 30-day on 30-day of cycles  Patient needs to follow-up with nephrology in clinic, discharged off diuretics and holding blood pressure medications  Patient will need to follow-up with general surgery in clinic  She will need to remain active with home health services, wound care      Day of Discharge     Vital Signs:  Temp:  [97.5 °F (36.4 °C)-98.9 °F (37.2 °C)] 98.9 °F  (37.2 °C)  Heart Rate:  [] 83  Resp:  [16] 16  BP: (124-151)/(41-45) 124/45  Flow (L/min) (Oxygen Therapy):  [2] 2  Physical Exam:   General: Sitting up in bed resting comfortably, no acute distress  Eyes: pupils equal, no scleral icterus  Cardiovascular: RRR, no murmurs   Pulmonary: CTA bilaterally; no wheezes; no conversational dyspnea  Abdomen soft has some tenderness around ostomy site, ostomy pink, brown stool  Doyle in place (at home states has had intermittent straight catheterization)  Negative pressure wound VAC for perianal wound in place       Discharge Details        Discharge Medications        New Medications        Instructions Start Date   collagenase 250 UNIT/GM ointment   1 Application, Topical, As Needed      collagenase 250 UNIT/GM ointment   1 Application, Topical, Daily             Changes to Medications        Instructions Start Date   Insulin Lispro (1 Unit Dial) 100 UNIT/ML solution pen-injector  Commonly known as: HUMALOG  What changed: See the new instructions.   5 Units, Subcutaneous, 3 Times Daily Before Meals      Levemir 100 UNIT/ML injection  Generic drug: insulin detemir  What changed: how much to take   5 Units, Subcutaneous, Nightly      Ozempic (1 MG/DOSE) 4 MG/3ML solution pen-injector  Generic drug: Semaglutide (1 MG/DOSE)  What changed: additional instructions   1 mg, Subcutaneous, Weekly             Continue These Medications        Instructions Start Date   arformoterol 15 MCG/2ML nebulizer solution  Commonly known as: BROVANA   15 mcg, Nebulization, 2 Times Daily - RT      Aspirin Low Dose 81 MG EC tablet  Generic drug: aspirin   81 mg, Oral, Every Morning      atorvastatin 20 MG tablet  Commonly known as: LIPITOR   20 mg, Oral, Every Night at Bedtime      budesonide 0.5 MG/2ML nebulizer solution  Commonly known as: Pulmicort   0.5 mg, Nebulization, 2 Times Daily      busPIRone 15 MG tablet  Commonly known as: BUSPAR   15 mg, Oral, 2 Times Daily      calcium citrate  950 (200 Ca) MG tablet  Commonly known as: CALCITRATE   950 mg, Oral, Every Night at Bedtime      docusate sodium 100 MG capsule  Commonly known as: COLACE   100 mg, Oral, 2 Times Daily      Eliquis 5 MG tablet tablet  Generic drug: apixaban   5 mg, Oral, Every 12 Hours      famotidine 40 MG tablet  Commonly known as: PEPCID   40 mg, Oral, Every Morning      Farxiga 5 MG tablet tablet  Generic drug: dapagliflozin   5 mg, Oral, Every Morning      ferrous gluconate 324 MG tablet  Commonly known as: FERGON   324 mg, Oral, Every Morning      finasteride 5 MG tablet  Commonly known as: PROSCAR   5 mg, Oral, Every Night at Bedtime      folic acid 1 MG tablet  Commonly known as: FOLVITE   1,000 mcg, Oral, Every Night at Bedtime      FreeStyle Bethany 3 Plus Sensor   2 each, Not Applicable, Every 30 Days, Apply as directed every 15 days      GNP One Daily Plus Iron tablet   1 tablet, Oral, Every Morning      ipratropium-albuterol 0.5-2.5 mg/3 ml nebulizer  Commonly known as: DUO-NEB   3 mL, Nebulization, Every 4 Hours PRN      Magnesium Oxide -Mg Supplement 400 (240 Mg) MG tablet   1 tablet, Every Night at Bedtime      montelukast 10 MG tablet  Commonly known as: SINGULAIR   10 mg, Oral, Nightly      O2  Commonly known as: OXYGEN   2 Liter O2 - CONTINUOUS (route: Oxygen)      tamsulosin 0.4 MG capsule 24 hr capsule  Commonly known as: FLOMAX   0.4 mg, Oral, Every Night at Bedtime      tiotropium 18 MCG per inhalation capsule  Commonly known as: SPIRIVA   1 capsule, Inhalation, Daily - RT      tobramycin  MG/5ML nebulizer solution  Commonly known as: MOIZ   300 mg, Nebulization, 2 Times Daily - RT, nebulize 1 vial BY MOUTH TWICE DAILY FOR 28 DAYS ON AND 28 DAYS OFF      vitamin C 500 MG tablet  Commonly known as: ASCORBIC ACID   500 mg, Oral, 2 Times Daily      Vitamin D3 50 MCG (2000 UT) tablet   50 mcg, Oral, Every Morning             Stop These Medications      bumetanide 2 MG tablet  Commonly known as: BUMEX      carvedilol 25 MG tablet  Commonly known as: COREG     DULoxetine 60 MG capsule  Commonly known as: CYMBALTA     meropenem 1 g injection  Commonly known as: MERREM     NIFEdipine XL 30 MG 24 hr tablet  Commonly known as: PROCARDIA XL     predniSONE 20 MG tablet  Commonly known as: DELTASONE     saccharomyces boulardii 250 MG capsule  Commonly known as: FLORASTOR     traZODone 100 MG tablet  Commonly known as: DESYREL              Allergies   Allergen Reactions   • Benadryl [Diphenhydramine] Itching   • Proventil [Albuterol] Other (See Comments)     Mouth sores         [place] [facility] [place]iet:  Hospital:  Diet Order   Procedures   • Diet: Regular/House, Diabetic, Cardiac; Healthy Heart (2-3 Na+); Consistent Carbohydrate; Fluid Consistency: Thin (IDDSI 0)       Discharge Activity:   Activity Instructions       Activity as Tolerated              CODE STATUS:  Code Status and Medical Interventions: CPR (Attempt to Resuscitate); Full Support   Ordered at: 01/23/25 1959     Code Status (Patient has no pulse and is not breathing):    CPR (Attempt to Resuscitate)     Medical Interventions (Patient has pulse or is breathing):    Full Support         Future Appointments   Date Time Provider Department Center   2/25/2025  1:15 PM Kim[place] [facility] [facility]   2/26/2025 11:00 AM Katerin Torres MD  [facility]   2/26/2025  2:00 PM Emerson Canada [place] [facility] [place] [facility]   3/6/2025 10:15 AM Betzaida Nelson APRN [facility] [place] [facility]   3/14/2025 11:20 AM Neli Paredes APRCHARITY [facility] [facility]   3/31/2025 10:00 AM Kyra Natarajan APRN [facility] [place] [facility]   4/24/2025 10:00 AM Kimmy Riley MD [place] [facility]DS [facility]       Additional Instructions for the Follow-ups that You Need to Schedule       Discharge Follow-up with PCP   As directed       Currently Documented PCP:    Kimmy Riley MD    PCP Phone Number:    [phone]     Follow Up Details: In less than one week         Discharge Follow-up with Specified Provider: Dr Canada; 1 Week   As directed      To: Dr Canada   Follow Up: 1 Week        Discharge Follow-up with Specified Provider: Dr Marx; 1 Week   As directed      To: Dr Marx   Follow Up: 1 Week        Discharge Follow-up with Specified Provider: Dr Corey Roche; 2 Weeks   As directed      To: Dr Corey Roche   Follow Up: 2 Weeks                Pertinent  and/or Most Recent Results     PROCEDURES:     INCISION AND DRAINAGE ABSCESS  Procedure Report     Patient Name:  Preston Wallis  YOB: 1965     Date of Surgery:  1/26/2025     Indications:  Perianal abscess     Pre-op Diagnosis:   Perianal abscess [K61.0]       Post-Op Diagnosis Codes:     * Perianal abscess [K61.0]     Procedure/CPT® Codes:  No CPT Code Applied in Case Entry     Procedure(s):  Incision and drainage of posterior perianal abscess  Sharp excisional debridement through skin and subcutaneous tissue in the posterior perianal area, 9 x 6 x 1 cm           Staff:  Surgeon(s):  Emerson Canada MD     Assistant: Kalli Leal RN CSA     Anesthesia: Choice     Estimated Blood Loss: minimal     Implants:    Nothing was implanted during the procedure     Specimen:          None         Findings: Grossly nonviable skin and subcutaneous tissue along the posterior midline of the perianal area extending to the anal verge     Complications: None apparent at the time of surgery     Description of Procedure: Informed consent was obtained before the patient was brought to the operating suite and placed in the prone jackknife position.  General anesthesia was induced and maintained throughout the procedure.  The patient's perianal area was prepped and draped in the standard sterile fashion before a timeout procedure was performed, verifying the correct patient, procedure, and other pertinent information.     Using a #15 blade scalpel, nonviable skin and subcutaneous tissue was sharply debrided  along the posterior midline of the perianal area.  A small abscess was encountered with all loculations easily broken up bluntly; a wound culture was obtained from this fluid and sent for analysis.  Necrotic tissue was noted to extend to the margin of the anal verge.  A slotted anoscope was inserted and confirmed healthy appearing mucosa within the anal canal; scope was then removed.  Sharp excisional debridement through skin and subcutaneous tissue was performed until healthy, bleeding tissue was encountered circumferentially.  Total area of debridement was 9 x 6 x 1 cm at the conclusion of the procedure.  Hemostasis was verified and achieved with electrocautery.  Wound was scrubbed with a Betadine soaked scrub brush.  There was no evidence of tunneling or any further fluid collection.  Wound was then packed with a small piece of Surgicel gauze and Betadine soaked 4 x 4's before application of a sterile dressing over top to conclude the procedure.     Patient tolerated the procedure well and there were no immediate complications.  All counts were correct x 2 at the end of the procedure.     Disposition: Stable in PACU - will need fecal diversion later this week           The surgical first assist listed above assisted with all needed aspects of the procedure.     Electronically signed by Emerosn Canada MD, 01/26/25, 4:29 PM EST.                 Procedure name: bronchoscopy with bronchoalveolar lavage     Indication: Bronchiectasis with acute exacerbation and failure to clear airway     Sedation-IV MAC per anesthesia service     Procedure details:  Patient was brought back to the bronchoscopy suite, a bite block was placed in the oral cavity, and a therapeutic bronchoscope was then used to intubate the trachea. The vocal cords inspected and appeared to have normal motion with inhalation and ventilation.  Inspection was performed of the left tracheobronchial tree and there was no endobronchial lesion seen to the  segmental level.  Inspection of the right tracheobronchial tree showed no endobronchial lesions to the segmental level.  There was a copious amounts of mucopurulent secretions seen throughout the entire tracheobronchial tree the secretions were thick and yellow in appearance, the bronchoscope was used to meticulously remove these thick secretions, behind the stick secretions there was copious amounts of thin yellow secretions that were draining from both the right upper right lower and left upper left lower lobe there was a fair amount of bronchitis seen throughout the entire tracheobronchial tree A bronchoalveolar lavage was obtained from right lower lobe bronchus        Estimated blood loss:  none     Postoperative diagnosis: Extensive mucous plugging     Patient tolerated procedure well, [no immediate complications        Plan  Transfer back to the floor                   Bronchoscopy Procedure Note     Patient Identification:  Preston Wallis  59 y.o.  male  1965  [id number]         Procedure:  Bronchoscopy, Therapeutic     Pre-Operative Diagnosis: Mucous plugging     Post-Operative Diagnosis: Same     Indication: COPD exacerbation with bronchiectasis mucous plugging     Anesthesia: Monitored Anesthesia Care (MAC)     Procedure Details: Patient was consented for the procedure with all risk and benefit of the procedure explained in detail.  Patient was given the opportunity to ask questions and all concerns were answered.  The bronchocope was inserted into the main airway via the oropharynx. An anatomical survey was done of the main airways and the subsegmental bronchus to at least the first subsegmental level of all five lobes of both lungs.  The findings are reported below.    Findings:  Bronchoscope passed through LMA to the level of the vocal cords.  Lidocaine used for local anesthetic over vocal cords.  Bronchoscope was passed between the vocal cords into the trachea.  All airways were visualized to at  least the first subsegment level of all 5 lobes of both lungs.  Congestion and edema office bronchial mucosa especially right and left lower lobe bronchi extremely friable with thick purulent secretions and mucous plugs noted in both right and left lower lobes mainly in the right washings were taken and mucous plugs were aspirated no growth or foreign body was noted         Estimated Blood Loss:  Minimal           Specimens:  Sent purulent fluid                Complications:  None; patient tolerated the procedure well.           Disposition: PACU - hemodynamically stable.        Patient tolerated the procedure well.           COLOSTOMY LAPAROSCOPIC  Procedure Report     Patient Name:  Preston Wallis  YOB: 1965     Date of Surgery:  2/6/2025     Indications: Nonhealing pressure wound near the anus, need for fecal diversion.     Pre-op Diagnosis:   Wound of gluteal cleft, unspecified laterality, subsequent encounter [S31.809D]       Post-Op Diagnosis Codes:     * Wound of gluteal cleft, unspecified laterality, subsequent encounter [S31.809D]  Umbilical hernia     Procedure/CPT® Codes:  RI LAPAROSCOPY SURG COLOSTOMY/SKN LVL CECOSTOMY [12667]     Procedure(s):  Primary repair of chronically incarcerated 3 cm umbilical hernia  Laparoscopic diverting loop colostomy           Staff:  Surgeon(s):  Emerson Canada MD     Assistant: Kalli Leal RN CSA     Anesthesia: General     Estimated Blood Loss: minimal     Implants:    Nothing was implanted during the procedure     Specimen:          None         Findings: Chronically incarcerated 3 cm primary umbilical hernia.     Complications: None apparent at the time of surgery     Description of Procedure: Informed consent was obtained before the patient was brought to the operating suite and placed in supine position.  General endotracheal anesthesia was induced and maintained throughout the procedure.  The patient's abdomen was prepped and draped in a standard  sterile fashion before a timeout procedure was performed, verifying the correct patient, procedure, and other pertinent information.     Local anesthetic was administered periumbilically.  Using a #15 blade scalpel, an infraumbilical curvilinear incision was made.  Sharp dissection was carried down until the cicatrix and hernia sac was encountered.  Cicatrix was then bluntly isolated and retracted away from the anterior abdominal wall.  Division between the stalk of the cicatrix and hernia sac was identified and sharply divided.  Hernia sac was sharply entered revealing a small amount of chronically incarcerated omentum.  Hernia defect was opened to just over 3 cm using electrocautery before contents were able to be reduced back into the abdomen.  A 12 mm balloon trocar was then inserted through the hernia defect and the gas applied to achieve adequate pneumoperitoneum at 15 mmHg.  Laparoscope was inserted and confirmed safe entrance into the abdomen.     Two additional 5 mm trocars were placed in the left hemiabdomen under direct visualization.  Attention was turned to the left lower quadrant where the redundant sigmoid colon was identified.  Colon was mobilized along the white line of Toldt using a harmonic scalpel.  This dissection was carried to the level of the spleen and the left colon was retracted medially.  The identified segment of sigmoid colon for planned loop colostomy formation was easily tented up towards the anterior abdominal wall.  Using electrocautery, a quarter sized skin incision was made in the left lower quadrant within the boundary of the rectus muscle.  Sharp dissection was carried down to the anterior rectus sheath.  This was then scored in a cruciate fashion.  Underlying rectus muscle was bluntly spread in the direction of its fibers.  Peritoneum was then sharply entered.  Grasped segment of sigmoid colon was then easily delivered through the wound.  A mesenteric window was created  posteriorly and the segment of colon secured with an ostomy bar.  Afferent and efferent limbs were identified.  Abdomen was allowed to desufflate after confirmation of hemostasis.  Ostomy appeared to be under no tension.  Trocars were then removed.     Umbilical hernia defect was then closed using interrupted 0 PDS in figure-of-eight fashion.  Umbilicus was tacked to the underlying fascia using a single 3-0 Vicryl.  All trocar sites were then closed using a skin stapler.  Sterile dressings were applied over top.  Attention was then turned to maturation of the loop colostomy.  Using electrocautery, the colon was partially divided.  Loop colostomy was then matured in the standard fashion using interrupted 3-0 Vicryl.  Digital exam confirmed patency in both limbs past the fascial level.  An ostomy appliance was cut to fit and placed over top to conclude the procedure.     Patient tolerated the procedure well and there were no immediate complications.  All counts were correct x 2 at the end of the procedure.     Disposition: Stable in PACU     This procedure was not performed to treat colon cancer through resection        The surgical first assist listed above assisted with all needed aspects of the procedure.     Electronically signed by Emerson Canada MD, 02/06/25, 1:25 PM EST.                             LAB RESULTS:      Lab 02/20/25 0448 02/19/25  0428 02/18/25  0423 02/17/25  0511   WBC 12.45* 12.88* 11.16* 11.98*   HEMOGLOBIN 8.2* 8.5* 8.2* 8.4*   HEMATOCRIT 26.0* 26.5* 26.0* 25.9*   PLATELETS 726* 703* 653* 575*   NEUTROS ABS 5.97 7.11* 6.07 6.62   IMMATURE GRANS (ABS) 0.17* 0.15* 0.11* 0.17*   LYMPHS ABS 2.71 2.39 2.00 2.31   MONOS ABS 2.17* 1.82* 1.56* 1.49*   EOS ABS 1.33* 1.31* 1.32* 1.30*   MCV 90.0 90.8 89.7 89.6         Lab 02/20/25  0448 02/19/25  0428 02/18/25  0423 02/17/25  0511 02/16/25  0343 02/15/25  0559 02/14/25  0543   SODIUM 135* 134* 133* 134* 132*  --  137   POTASSIUM 3.7 3.8 3.7 4.0 4.0   --  4.2   CHLORIDE 100 98 97* 97* 94*  --  99   CO2 26.2 26.5 26.8 26.7 24.5  --  29.5*   ANION GAP 8.8 9.5 9.2 10.3 13.5  --  8.5   BUN 81* 75* 68* 64* 67*  --  58*   CREATININE 2.59* 2.44* 2.82* 2.63* 2.82*  --  2.62*   EGFR 27.7* 29.7* 25.0* 27.2* 25.0*  --  27.3*   GLUCOSE 161* 170* 184* 155* 275*  --  135*   CALCIUM 9.7 9.5 9.2 9.0 8.4*  8.5*   < > 8.7   MAGNESIUM 1.9 1.8 1.8 1.9  --   --   --    PHOSPHORUS 2.5 2.6 2.9 2.6  --   --  3.9    < > = values in this interval not displayed.         Lab 02/17/25  0511 02/14/25  0543   TOTAL PROTEIN 6.5  --    ALBUMIN 2.5* 2.5*   GLOBULIN 4.0  --    ALT (SGPT) 16  --    AST (SGOT) 23  --    BILIRUBIN 0.2  --    ALK PHOS 130*  --          Lab 02/16/25  0343   PROBNP 1,131.0*                 Brief Urine Lab Results  (Last result in the past 365 days)        Color   Clarity   Blood   Leuk Est   Nitrite   Protein   CREAT   Urine HCG        02/11/25 1125             14.2         02/11/25 1125 Yellow   Clear   Small (1+)   Trace   Negative   100 mg/dL (2+)                 Microbiology Results (last 10 days)       ** No results found for the last 240 hours. **            XR Chest 1 View    Result Date: 2/5/2025  Impression: Impression: 1.Bilateral upper lobe airspace opacities corresponding to areas of bronchiectasis seen on the prior examination. 2.Right basilar airspace disease which may represent atelectasis or pneumonia. 3.Trace bilateral pleural effusions. 4.Cardiomegaly. Electronically Signed: Mynor Areli  2/5/2025 7:47 AM EST  Workstation ID: [id number]    CT Abdomen Pelvis Without Contrast    Result Date: 1/25/2025  Impression: Impression: 1.There is a posterior perianal abscess as detailed above. No intrapelvic extension. 2.Cardiomegaly with relatively small pericardial effusion. There is bibasilar atelectasis with trace pleural effusions. 3.Other incidental nonemergent findings as detailed above. Electronically Signed: Rob Milner MD  1/25/2025 10:55 AM EST   Workstation ID: [id number]    CT Chest Without Contrast Diagnostic    Result Date: 1/24/2025  Impression: Impression: 1.There is a new somewhat spiculated nodular focus in the superior left lower lobe image #66 of series. The rapid interval development suggests an infectious or inflammatory process. 3-month follow-up CT recommended per Fleischner guidelines. 2.Stable appearance of verrucous and cystic bronchiectasis involving all lobes of the lungs, but greatest in the right upper lobe and lower lobes. There are again adjacent micronodular densities in a peribronchial distribution suggesting an infectious or  inflammatory process. Nontuberculous mycobacterial infection or other atypical agent would be a top considerations. 3.Small bilateral pleural effusions, slightly increased from prior. There is consolidation in the lung bases bilaterally which may be due to atelectasis or additional infiltrates. 4.Increasing pericardial effusion, now measuring up to 2.6 cm posteriorly on the right. 5.Enlarged right paratracheal lymph node measuring up to 1.4 cm, likely reactive. Hilar adenopathy is also suspected, but not well characterized due to noncontrast technique. 6.Additional findings as given above. Electronically Signed: Deshawn Soto MD  1/24/2025 9:43 AM EST  Workstation ID: [id number]    XR Chest 1 View    Result Date: 1/23/2025  Impression: Impression: Patchy chronic bilateral lung infiltrates. No acute infiltrate. Electronically Signed: Amado Salmeron MD  1/23/2025 5:11 PM EST  Workstation ID: [id number]     Results for orders placed during the hospital encounter of 04/14/24    Doppler Arterial Multi Level Lower Extremity - Bilateral CAR    Interpretation Summary  •  Right Conclusion: The right KEYLA is unable to be assessed due to vessel incompressibility. Normal digital pressures.  •  Left Conclusion: The left KEYLA is unable to be assessed due to vessel incompressibility. Unable to assess digital ischemia.  •  Nonocclusive,  poorly compressible tibial level disease bilaterally.  Normal waveforms are present through the ankle levels bilaterally.      Results for orders placed during the hospital encounter of 04/14/24    Doppler Arterial Multi Level Lower Extremity - Bilateral CAR    Interpretation Summary  •  Right Conclusion: The right KEYLA is unable to be assessed due to vessel incompressibility. Normal digital pressures.  •  Left Conclusion: The left KEYLA is unable to be assessed due to vessel incompressibility. Unable to assess digital ischemia.  •  Nonocclusive, poorly compressible tibial level disease bilaterally.  Normal waveforms are present through the ankle levels bilaterally.      Results for orders placed during the hospital encounter of 01/23/25    Adult Transthoracic Echo Complete W/ Cont if Necessary Per Protocol    Interpretation Summary  •  Left ventricular systolic function is low normal. Calculated left ventricular EF = 49.4%  •  Left ventricular wall thickness is consistent with moderate concentric hypertrophy.  •  Left ventricular diastolic function is consistent with (grade I) impaired relaxation.  •  There is a small (<1cm) pericardial effusion adjacent to the right ventricle.      Labs Pending at Discharge:  Pending Labs       Order Current Status    Flow Cytometry Collected (02/03/25 8152)    AFB Culture - Lavage, Lung, Right Upper Lobe Preliminary result    AFB Culture - Sputum, Cough Preliminary result    AFB Culture - Surgical Site, Perirectal Abscess Preliminary result    AFB Culture - Wash, Bronchus Preliminary result              Time spent on Discharge including face to face service:  55 minutes    Electronically signed by Kevon Vargas MD, 02/20/25, 6:33 PM EST.

## 2025-02-20 NOTE — PLAN OF CARE
Goal Outcome Evaluation:  Plan of Care Reviewed With: patient        Progress: improving  Outcome Evaluation: pt discharging home with home health

## 2025-02-21 ENCOUNTER — TRANSITIONAL CARE MANAGEMENT TELEPHONE ENCOUNTER (OUTPATIENT)
Dept: CALL CENTER | Facility: HOSPITAL | Age: 60
End: 2025-02-21
Payer: COMMERCIAL

## 2025-02-21 ENCOUNTER — PATIENT OUTREACH (OUTPATIENT)
Dept: CASE MANAGEMENT | Facility: OTHER | Age: 60
End: 2025-02-21
Payer: COMMERCIAL

## 2025-02-21 DIAGNOSIS — Z74.09 IMPAIRED MOBILITY AND ENDURANCE: Primary | ICD-10-CM

## 2025-02-21 DIAGNOSIS — D50.8 IRON DEFICIENCY ANEMIA SECONDARY TO INADEQUATE DIETARY IRON INTAKE: ICD-10-CM

## 2025-02-21 LAB
MYCOBACTERIUM SPEC CULT: NORMAL
NIGHT BLUE STAIN TISS: NORMAL
NIGHT BLUE STAIN TISS: NORMAL

## 2025-02-21 NOTE — SIGNIFICANT NOTE
Patient to DC home today   Dressing was applied yesterday , is intact and green seal on device noted   Educated patient on home device    He is able to replace cannister connect and disconnect his dressing from device and understanding troubleshooting and vendor number location on device. Patient education booklet provided.    [id number]     Hospital VAC [id number] cleaned and placed in soiled utility room for vendor pickup on 3W     Home health to follow at home tomorrow     Ostomy supplies and home vac supplies to include 1 white foam packed for patient to take home with him.

## 2025-02-21 NOTE — OUTREACH NOTE
AMBULATORY CASE MANAGEMENT NOTE    Names and Relationships of Patient/Support Persons:  -       Gómez/Elizabeth brought in medications and planners.  Went through the discharge paperwork and made adjustments to the medication planner to reflect the changes in the medication.    [facility] to inform him of d/c medications and faxed him the discharge information for him to stop on profile.        Stop These Medications       bumetanide 2 MG tablet  Commonly known as: BUMEX      carvedilol 25 MG tablet  Commonly known as: COREG      DULoxetine 60 MG capsule  Commonly known as: CYMBALTA      meropenem 1 g injection  Commonly known as: MERREM      NIFEdipine XL 30 MG 24 hr tablet  Commonly known as: PROCARDIA XL      predniSONE 20 MG tablet  Commonly known as: DELTASONE      saccharomyces boulardii 250 MG capsule  Commonly known as: FLORASTOR      traZODone 100 MG tablet  Commonly known as: DESYREL        Informed Elizabeth of the medication / insulin dosage changes.  Also let her know about someone is oncall at our office 24/7 if they need over the weekend.  She has already arranged transportation for next week appointments.      Tara GOMEZ  Ambulatory Case Management    2/21/2025, 08:37 EST

## 2025-02-21 NOTE — OUTREACH NOTE
Call Center TCM Note      Flowsheet Row Responses   [facility] patient discharged from? Non-   Does the patient have one of the following disease processes/diagnoses(primary or secondary)? Other   TCM attempt successful? Yes   Call start time 1541   Call end time 1544   Discharge diagnosis Acute hypoxemic respiratory failure   Is patient permission given to speak with other caregiver? Yes   List who call center can speak with Wife and pt   Person spoke with today (if not patient) and relationship Wife and pt   Meds reviewed with patient/caregiver? Yes   Is the patient having any side effects they believe may be caused by any medication additions or changes? No   Does the patient have all medications ordered at discharge? Yes   Is the patient taking all medications as directed (includes completed medication regime)? Yes   Comments Hosp dc fu apt on 2/25/25   Does the patient have an appointment with their PCP within 7-14 days of discharge? Yes   What is the Home health agency?  [facility]   Has home health visited the patient within 72 hours of discharge? Call prior to 72 hours   Home health comments pt has contact info   What DME was ordered? [employer]   Has all DME been delivered? Yes   Did the patient receive a copy of their discharge instructions? Yes   Nursing interventions Reviewed instructions with patient   What is the patient's perception of their health status since discharge? Improving   Is the patient/caregiver able to teach back signs and symptoms related to disease process for when to call PCP? Yes   Is the patient/caregiver able to teach back signs and symptoms related to disease process for when to call 911? Yes   Is the patient/caregiver able to teach back the hierarchy of who to call/visit for symptoms/problems? PCP, Specialist, Home health nurse, Urgent Care, ED, 911 Yes   If the patient is a current smoker, are they able to teach back resources for cessation? Not a smoker    TCM call completed? Yes   Call end time [id number]            Herminia Ray, RN    2/21/2025, 15:45 EST

## 2025-02-23 LAB
MYCOBACTERIUM SPEC CULT: NORMAL
NIGHT BLUE STAIN TISS: NORMAL

## 2025-02-24 ENCOUNTER — PATIENT OUTREACH (OUTPATIENT)
Age: 60
End: 2025-02-24
Payer: COMMERCIAL

## 2025-02-24 ENCOUNTER — PATIENT OUTREACH (OUTPATIENT)
Dept: CASE MANAGEMENT | Facility: OTHER | Age: 60
End: 2025-02-24
Payer: COMMERCIAL

## 2025-02-24 DIAGNOSIS — K61.0 PERIANAL ABSCESS: Primary | ICD-10-CM

## 2025-02-24 NOTE — OUTREACH NOTE
AMBULATORY CASE MANAGEMENT NOTE    Names and Relationships of Patient/Support Persons: Contact: Kami Wallis; Relationship: Emergency Contact -     Elizabeth called asking how she is to get Gómez out of the bed and into the wheelchair and dressed for his appointment tomorrow.  He is still in bed since his discharge on Thursday.  Eating and drinking good.    She is afraid to have him scoot because he has the wound vac and afraid that it could dislodge and he is also in a lot of pain.   Called [facility] to see when they are coming, maybe PT can come and help with methods of getting him to bedside.    Spoke with Verónica and they just received the notice of discharge today, they were planning to go tomorrow.  He has pcp and wound tomorrow.  Elizabeth has already arranged transportation for these visits.  But the mechanics of getting him out of bed is the concern today.  Asked about the dressing, Elizabeth admits that she is unsure since she cannot get him to roll onto his side all the way, she has not actually seen it.     Tara GOMEZ  Ambulatory Case Management    2/24/2025, 10:34 EST  Care Coordination    Kenisha called after her initial visit with Gómez.  She states that he needs a higher level of care after seeing him today.  He is a 2+ assist to turn in his bed and cannot manage wound vac with just wife/son at home.  Recommend SNF for placement.  Patient is agreeable.  Faxed records to [facility] to Christie will follow up in the AM.  Cancel pcp appt in am.

## 2025-02-24 NOTE — OUTREACH NOTE
Social Work Assessment  Questions/Answers      Flowsheet Row Most Recent Value   Referral Source physician   Reason for Consult community resources   Preferred Language English   Permission Granted to Share Info With family/designee   Advance Care Planning Reviewed present on chart   Decision Making Considerations patient/family ability to make health care decisions   People in Home spouse   Current Living Arrangements home   In the past 12 months has the electric, gas, oil, or water company threatened to shut off services in your home? No   Primary Care Provided by spouse/significant other   Provides Primary Care For no one, unable/limited ability to care for self   Family Caregiver if Needed spouse   Quality of Family Relationships helpful   Employment Status disabled   Current or Previous  Service none   Source of Income disability   Application for Public Assistance obtained public assistance pending number   Spiritual, Cultural Beliefs, Catholic Practices, Values that Affect Care no   Medications assistive person   Meal Preparation assistive person   Housekeeping assistive person   Laundry assistive person   Shopping assistive person   If for any reason you need help with day-to-day activities such as bathing, preparing meals, shopping, managing finances, etc., do you get the help you need? I could use a little more help   Major Change/Loss/Stressor medical condition/diagnosis   Sources of Support spouse   Reaction to Health Status adjusting   Do you want help finding or keeping work or a job? I do not need or want help          SDOH updated and reviewed with the patient during this program:  --     Disabilities: At Risk (1/24/2025)    Disabilities     Concentrating, Remembering, or Making Decisions Difficulty: no     Doing Errands Independently Difficulty: yes      --     Employment: Not At Risk (2/24/2025)    Employment     Do you want help finding or keeping work or a job?: I do not need or want  help      Financial Resource Strain: Low Risk  (1/30/2025)    Overall Financial Resource Strain (CARDIA)     Difficulty of Paying Living Expenses: Not hard at all      --     Food Insecurity: No Food Insecurity (1/24/2025)    Hunger Vital Sign     Worried About Running Out of Food in the Last Year: Never true     Ran Out of Food in the Last Year: Never true      --     Housing Stability: Not At Risk (2/24/2025)    Housing Stability     Current Living Arrangements: home     Potentially Unsafe Housing Conditions: none      --     Transportation Needs: No Transportation Needs (1/24/2025)    PRAPARE - Transportation     Lack of Transportation (Medical): No     Lack of Transportation (Non-Medical): No      --     Utilities: Not At Risk (2/24/2025)    [facility] Utilities     Threatened with loss of utilities: No      Continuing Care   [facility]    [address] [place] [address]    Phone: [phone]    Request Status: Pending - No Request Sent    Services: Financial Assistance, Food Insecurity Services, Personal Care Services    Resource for: Financial Resource Strain, Food Insecurity, Utilities     Patient Outreach    MSW spoke with patient's spouse to discuss resources needed at this time. Spouse has a caregiver in mind she would like to assist patient. MSW educated on waiver program and how to apply. MSW did advise there is currently a waitlist for services, but MSW will mail information for patient/spouse to start the process for applying. MSW available should patient or spouse have any additional needs or questions.    Vanessa GOMEZ -   Ambulatory Case Management    2/24/2025, 13:59 EST

## 2025-02-25 ENCOUNTER — TELEPHONE (OUTPATIENT)
Dept: SURGERY | Facility: CLINIC | Age: 60
End: 2025-02-25
Payer: COMMERCIAL

## 2025-02-25 ENCOUNTER — PATIENT OUTREACH (OUTPATIENT)
Dept: CASE MANAGEMENT | Facility: OTHER | Age: 60
End: 2025-02-25
Payer: COMMERCIAL

## 2025-02-25 ENCOUNTER — PATIENT OUTREACH (OUTPATIENT)
Age: 60
End: 2025-02-25
Payer: COMMERCIAL

## 2025-02-25 DIAGNOSIS — R53.81 PHYSICAL DEBILITY: Primary | ICD-10-CM

## 2025-02-25 LAB
MYCOBACTERIUM SPEC CULT: NORMAL
NIGHT BLUE STAIN TISS: NORMAL
NIGHT BLUE STAIN TISS: NORMAL

## 2025-02-25 NOTE — TELEPHONE ENCOUNTER
NAWAF, NURSE NAVIGATOR, CALLED FROM [facility], OFFICE OF DR. PUTNAM.    PATIENT HAS AN APPOINTMENT WITH DR. PERALES TOMORROW.  WIFE SAID SHE CAN'T GET HIM OUT OF BED, AND DOESN'T KNOW HOW SHE WILL BE ABLE GET HIM HER.    PATIENT HAS A WOUND VAC.      NAWAF SAID THE HOME HEALTH NURSE SAW PATIENT YESTERDAY, AND SAID THAT HE NEEDS TO GO TO A REHAB.  NAWAF SAID SHE HAS A CALL OUT TO HOME HEALTH.    DO WE HAVE ANY REASSURANCE OR GUIDANCE FOR PATIENT?    NAWAF WOULD LIKE TO SPEAK TO MA FOR DR. PERALES.      #[phone]

## 2025-02-25 NOTE — OUTREACH NOTE
AMBULATORY CASE MANAGEMENT NOTE    Names and Relationships of Patient/Support Persons: Contact: [facility] - Brianne; Relationship:   Contact: [facility] -; Relationship:   Contact: Kami Wallis; Relationship: Emergency Contact -     Reached out to [facility] to see if they received the records from yesterday.  They have.  There is a process that needs to be followed before admission.  Records reviewed and insurance approval.  They also have to accept him.    Asked Brianne if this would take place today, she cannot confirm but will call me, phone number given.      Reaching out to  at hospital to see if they can assist.    Elizabeth will call if she hears from [facility].     [facility] called and they have accepted him.  Spoke with elizabeth and she confirms that he needs EMS transport.    Reached out to EMS, they have to have preauth from insurance.    Insurance directed me where to go online to get the proper paperwork filled out.    Filled out and faxed back to [employer].    Once auth is faxed, can send EMS approval and get him transportation.          Tara GOMEZ  Ambulatory Case Management    2/25/2025, 10:12 EST

## 2025-02-25 NOTE — TELEPHONE ENCOUNTER
I do not believe we can get him admitted to a rehab facility as an outpatient. Is this something his PCP does? His ostomy bar needs removed by home health at the very least if he cannot come in for evaluation tomorrow

## 2025-02-25 NOTE — TELEPHONE ENCOUNTER
Spoke with Sima and relayed Dr. Canada's message. Sima states that she is waiting to hear back from [facility] to see if they will accept the patient because home health would not accept the patien/t. Patient required 2 people to be able to inspect his drain. He is still in his hospital gown from Thursday. Sima states that the patient may just need to go back to the hospital via EMS. I did suggest that the patient/patient's wife can contact EMS to schedule a  from them. We are unable to arrange for that as the patient has to be the one to accept the charges should the insurance not cover the EMS ride. Sima was advised to call our office with updates or if she had any other questions or concerns. Sima v/dominga.

## 2025-02-25 NOTE — OUTREACH NOTE
Care Coordination    MSW care coordinated with Sima regarding patient going to SNF and transportation needed. Sima has form needed and obtaining PCP signatures for precert required through EMS.    MSW reached out to [employer] Provider line and obtained fax#[phone] for authorization to be faxed to.     Per Yessi at [facility], [employer] will fax PCP clinic approval for precert.     MSW available if needed for future supports.    Vanessa GOMEZ -   Ambulatory Case Management    2/25/2025, 15:02 EST

## 2025-02-27 ENCOUNTER — TELEPHONE (OUTPATIENT)
Dept: SURGERY | Facility: CLINIC | Age: 60
End: 2025-02-27
Payer: COMMERCIAL

## 2025-02-27 ENCOUNTER — PATIENT OUTREACH (OUTPATIENT)
Age: 60
End: 2025-02-27
Payer: COMMERCIAL

## 2025-02-27 ENCOUNTER — PATIENT OUTREACH (OUTPATIENT)
Dept: CASE MANAGEMENT | Facility: OTHER | Age: 60
End: 2025-02-27
Payer: COMMERCIAL

## 2025-02-27 DIAGNOSIS — J44.1 CHRONIC OBSTRUCTIVE PULMONARY DISEASE WITH ACUTE EXACERBATION: ICD-10-CM

## 2025-02-27 DIAGNOSIS — J96.11 CHRONIC RESPIRATORY FAILURE WITH HYPOXIA AND HYPERCAPNIA: ICD-10-CM

## 2025-02-27 DIAGNOSIS — K61.0 PERIANAL ABSCESS: ICD-10-CM

## 2025-02-27 DIAGNOSIS — E11.65 UNCONTROLLED TYPE 2 DIABETES MELLITUS WITH HYPERGLYCEMIA, WITH LONG-TERM CURRENT USE OF INSULIN: ICD-10-CM

## 2025-02-27 DIAGNOSIS — N18.4 CHRONIC KIDNEY DISEASE, STAGE IV (SEVERE): Primary | ICD-10-CM

## 2025-02-27 DIAGNOSIS — Z79.4 UNCONTROLLED TYPE 2 DIABETES MELLITUS WITH HYPERGLYCEMIA, WITH LONG-TERM CURRENT USE OF INSULIN: ICD-10-CM

## 2025-02-27 DIAGNOSIS — J96.12 CHRONIC RESPIRATORY FAILURE WITH HYPOXIA AND HYPERCAPNIA: ICD-10-CM

## 2025-02-27 NOTE — TELEPHONE ENCOUNTER
Per Jaky, patient can see April on 3/4/2025 @ 10am. Spoke with the nursing home staff and will have previous caller return my call. It sounds like her name may be Nathan and not Amy.

## 2025-02-27 NOTE — OUTREACH NOTE
[facility] End of Month Documentation    This Chronic Medical Management Care Plan for Preston Wallis, 59 y.o. male, has been monitored and managed; reviewed and a new plan of care implemented for the month of February.  A cumulative time of 201  minutes was spent on this patient record this month, including phone call with care giver; electronic communication with primary care provider; electronic communication with pharmacist; chart review.    Regarding the patient's problems: has Chronic cough; Pneumonia due to COVID-19 virus; Polyneuropathy; Paroxysmal atrial fibrillation; Obstructive sleep apnea; MRSA pneumonia; Low back pain; Chronic diastolic (congestive) heart failure; Allergies; COPD exacerbation; Chronic anticoagulation; Benign prostatic hyperplasia; Difficulty using continuous positive airway pressure (CPAP) device; Stage 3a chronic kidney disease; Iron deficiency anemia secondary to inadequate dietary iron intake; Vitamin D deficiency; Class 3 severe obesity with serious comorbidity in adult; Lower extremity edema; Elevated alkaline phosphatase level; Venous insufficiency (chronic) (peripheral); Tobacco abuse, in remission; History of Pseudomonas pneumonia; Chronic dyspnea; Gastroesophageal reflux disease; Bronchiectasis without complication; ERIC (acute kidney injury); Altered mental status; History of anemia due to chronic kidney disease; Hyperlipidemia; Luetscher's syndrome; Neurogenic bladder; Class 1 obesity; Pneumonia due to Pseudomonas species; Seizures; Primary osteoarthritis of left knee; Other constipation; Chronic obstructive pulmonary disease; Type 2 diabetes mellitus with hyperglycemia, with long-term current use of insulin; Essential hypertension; Stage 3b chronic kidney disease; Annual physical exam; Long-term use of high-risk medication; Personal history of PE (pulmonary embolism); Encounter for aftercare for healing closed traumatic fracture of left femur; Chronic pain of left knee; Primary  osteoarthritis of right knee; Sepsis; Bronchiectasis with acute exacerbation; Bacterial pneumonia; Anemia; Closed fracture of left tibial plateau; Septic joint; Septic arthritis; Skin ulcer of left heel, limited to breakdown of skin; Ulcer of left foot, limited to breakdown of skin; Left foot pain; Wheezing; Acute on chronic respiratory failure with hypercapnia; Chronic respiratory failure with hypoxia and hypercapnia; Acute hypoxic on chronic hypercapnic respiratory failure; Impaired cognition; Atherosclerosis of native arteries of the extremities with ulceration; PNA (pneumonia); Perianal abscess; 1. Incision and drainage of posterior perianal abscess 2. Sharp excisional debridement through skin and subcutaneous tissue in the posterior perianal area, 9 x 6 x 1 cm; Wound of gluteal cleft; Chronic kidney disease, stage IV (severe); and Colostomy status on their problem list., the following items were addressed: medications; transitions to medical care; medical records; referrals to community service providers and any changes can be found within the plan section of the note.  A detailed listing of time spent for chronic care management is tracked within each outreach encounter.  Current medications include:  has a current medication list which includes the following prescription(s): arformoterol, aspirin low dose, atorvastatin, budesonide, buspirone, calcium citrate, vitamin d3, collagenase, collagenase, freestyle clau 3 plus sensor, docusate sodium, eliquis, famotidine, farxiga, ferrous gluconate, finasteride, folic acid, levemir, insulin lispro (1 unit dial), ipratropium-albuterol, magnesium oxide -mg supplement, montelukast, gnp one daily plus iron, o2, ozempic (1 mg/dose), tamsulosin, tiotropium, tobramycin pf, and vitamin c. and the patient is reported to be caregiver will take responsibility for med compliance; patient is compliant with medication protocol,  Medications are reported to be non-effective in  controlling symptoms and changes have been made to the medication protocol.  Regarding these diagnoses, referrals were made to the following provider(s):  specialists.  All notes on chart for PCP to review.    The patient was monitored remotely for pain; medications; blood glucose; mood & behavior; activity level.    The patient's physical needs include:  help taking medications as prescribed; medication education; needs assistance with ADLs; resources for disability needs; DME supplies.     The patient's mental support needs include:  continued support    The patient's cognitive support needs include:  medication; continued support; needs assistance with ADLs; health care    The patient's psychosocial support needs include:  continued support; coordination of community providers; medication management or adherence    The patient's functional needs include: health care coverage; medication education; needs assistance for ADLs; physical healthcare; physician referral; resources for disability needs    The patient's environmental needs include:  resources for disability needs; no involvement in outside activities or no access to other activities    Care Plan overall comments:  Still not at goal, however improving. will continue to follow. Has many upcoming appointments and referrals to specialists. A1c not at goal, continuing ways to improve with addition of new medications. Will follow, new hospitalization.    Refer to previous outreach notes for more information on the areas listed above.    Monthly Billing Diagnoses  (N18.4) Chronic kidney disease, stage IV (severe)    (K61.0) Perianal abscess    (J96.11,  J96.12) Chronic respiratory failure with hypoxia and hypercapnia    (J44.1) Chronic obstructive pulmonary disease with acute exacerbation    (E11.65,  Z79.4) Uncontrolled type 2 diabetes mellitus with hyperglycemia, with long-term current use of insulin    Medications   Medications have been reconciled    Care  Plan progress this month:      Recently Modified Care Plans Updates made since 1/27/2025 12:00 AM      No recently modified care plans.             Chronic Kidney       Track and Manage My Symptoms (Not Progressing)       Start:  12/06/24    Expected End:  06/06/25         My Symptom Management To Do List       Start:  12/06/24         Why is this important?  Keeping track of symptoms can help you feel the best. It also helps the doctor stay on top of any changes to the disease. It may also help keep your disease from getting worse. Taking simple steps can help you cope with  symptoms like feeling very tired or itchy skin.              Follow My Treatment Plan (Progressing)       Start:  12/06/24    Expected End:  06/06/25         My Treatment Plan To Do List       Start:  12/06/24         Why is this important?  Staying as healthy as you can is very important. This may mean making changes if you smoke, don't exercise or eat poorly.  A healthy lifestyle is an important goal for you.  Following the treatment plan and making changes may be hard.  Try some of these steps to help keep the disease from getting worse.              Manage My Diet (Not Progressing)       Start:  12/06/24    Expected End:  06/06/25         My Diet Management To Do List       Start:  12/06/24         Why is this important?  A healthy diet is important for mental and physical health.  Healthy food helps repair damaged body tissue and maintains strong bones and muscles.  No single food is just right so eating a variety of proteins, fruits, vegetables and grains is best.  You may need to change what you eat or drink to manage kidney disease.  A dietitian is the best person to guide you.              Optimal Care Coordination of a Patient Experiencing Chronic Kidney Disease (Progressing)       Start:  12/06/24    Expected End:  06/06/25         Support Psychological Response to Chronic Kidney Disease       Start:  12/06/24          Initial     Support Psychological Response to Chronic Kidney Disease: caregiver stress acknowledged;caregiver support provided;goal setting facilitated;positive reinforcement provided;self-care encouraged taken at 12/06/24         Facilitate Activities to Optimize Comfort and Well-Being       Start:  12/06/24          Initial    Facilitate Activities to Optimize Comfort and Well-Being: alternating periods of activity with rest promoted;sleep-hygiene practices encouraged taken at 12/06/24         Alleviate Barriers to Chronic Kidney Disease Treatment       Start:  12/06/24          Initial    Alleviate Barriers to Chronic Kidney Disease Treatment: medication side effects managed;barriers to treatment adherence identified;activity or exercise based on tolerance encouraged Reached out to nephrology office to review labs and any interventions needed.  Sent abnormal labs to oncall provider and pcp. taken at 12/06/24         Maintain or Improve Strength or Functional Ability       Start:  12/06/24          Initial    Maintain or Improve Strength or Functional Ability: activity or exercise based on tolerance encouraged;assistive or adaptive device use encouraged taken at 12/06/24         Alleviate Barriers to Optimal Nutrition Status       Start:  12/06/24          Initial    Alleviate Barriers to Optimal Nutrition Status: diversity of foods encouraged;support and encouragement provided taken at 12/06/24         Facilitate Multi-Modal Pain Management       Start:  12/06/24          Initial    Facilitate Multi-Modal Pain Management: pain assessed taken at 12/06/24         Develop and Maintain Palliative Care Plan       Start:  12/06/24                Current Specialty Plan of Care Status signed by both patient and provider    Instructions   Patient was provided an electronic copy of care plan  CCM services were explained and offered and patient has accepted these services.  Patient has given their written consent to receive CCM  services and understands that this includes the authorization of electronic communication of medical information with the other treating providers.  Patient understands that they may stop CCM services at any time and these changes will be effective at the end of the calendar month and will effectively revocate the agreement of CCM services.  Patient understands that only one practitioner can furnish and be paid for CCM services during one calendar month.  Patient also understands that there may be co-payment or deductible fees in association with CCM services.  Patient will continue with at least monthly follow-up calls with the Ambulatory .    Tara GOMEZ  Ambulatory Case Management    2/27/2025, 11:48 EST

## 2025-02-27 NOTE — OUTREACH NOTE
Patient Outreach    MSW sent information via mail to patient's spouse for How to Apply for waiver services. MSW sent to Sima to please mail.    Vanessa GOMEZ -   Ambulatory Case Management    2/27/2025, 15:58 EST

## 2025-02-27 NOTE — TELEPHONE ENCOUNTER
ANDREW CALLED FROM [facility].  PATIENT HAS AN APPOINTMENT 02/28/25.  SHE NEEDS TO CANCEL IT AND RESCHEDULE.  PATIENT HAS TO COME VIA EMS, AND THEY NEED 3 DAYS NOTICE FOR TRANSPORTATION.     SHE SAID HE HAS STAPLES.  SHE WAS TOLD DR. PERALES WILL BE OUT.  IS THERE AN NP OR SOMEONE TO SEE?    CB#[phone]  ASK FOR [facility].

## 2025-02-27 NOTE — TELEPHONE ENCOUNTER
JOE RETURNED CALL TO TAD.  I MISUNDERSTOOD HER NAME PREVIOUSLY.    I TOLD HER THAT THE PATIENT CAN SEE MONICA MERCADO ON 03/04/25 AT 10:00 AM.  JOE AGREEABLE WITH APPOINTMENT.

## 2025-02-28 LAB
MYCOBACTERIUM SPEC CULT: NORMAL
NIGHT BLUE STAIN TISS: NORMAL
NIGHT BLUE STAIN TISS: NORMAL

## 2025-03-02 LAB
MYCOBACTERIUM SPEC CULT: NORMAL
NIGHT BLUE STAIN TISS: NORMAL

## 2025-03-04 LAB
MYCOBACTERIUM SPEC CULT: NORMAL
NIGHT BLUE STAIN TISS: NORMAL
NIGHT BLUE STAIN TISS: NORMAL

## 2025-03-06 ENCOUNTER — TELEPHONE (OUTPATIENT)
Dept: SURGERY | Facility: CLINIC | Age: 60
End: 2025-03-06
Payer: COMMERCIAL
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
## 2025-03-06 NOTE — PROGRESS NOTES
Primary Care Provider  Kimmy Riley MD     Referring Provider  No ref. provider found     Patient or patient representative verbalized consent for the use of Ambient Listening during the visit with  CON [facility] for chart documentation. 6/30/2025  11:00 EDT    Chief Complaint  Follow-up, Wheezing, and Sleep Apnea (oxygen)    Subjective          History of Presenting Illness    History of Present Illness  The patient is a 60-year-old male, patient of Dr. Jean Baptiste who presents for management of bronchiectasis who presents for a follow-up visit today.  He is accompanied by his wife.    He reports abnormal breathing patterns but does not experience any fever, chills, hemoptysis, or chest pain. He is currently on a regimen of tobramycin nebulizer treatments, administered every other month, and also uses Singulair, DuoNeb, Brovana, and Pulmicort. He has an albuterol inhaler at home and is not experiencing any adverse reactions to it. He has a flutter valve but does not use a chest vest due to the presence of a colostomy bag. He does not use saline nebulizers.    He has a significant pressure wound on his leg, which is nearing complete healing under the [facility]. He underwent a wound VAC procedure during his hospital stay in 04/2025.    He has a history of diabetes, with recent blood glucose levels recorded at 86 and 105. He was hospitalized in 04/2025 at [facility] for nausea and vomiting, and congestive heart failure. He is under the care of a cardiologist, whom he visits annually.  Patient did have a chest CT scan completed on 4/22/2025 during hospitalization.  Report states severe bronchiectasis centrally in both lungs.  Improved right lower lobe infiltrate compared with the patient's last study. No new infiltrates are identified. Small nodular densities peripherally in the lungs likely related to mucous plugging.    He currently has a colostomy bag in place, which he has had  "for approximately 6 months to a year. He had an abscess on his back side.    Patient does have a history of sleep apnea, however declines CPAP machine use due to having panic attacks.      Patient denies fever, chills, night sweats, swollen glands in the head and neck, unintentional weight loss, hemoptysis, purulent sputum production, dysphagia, chest pain, palpitations, chest tightness, abdominal pain, nausea, vomiting, and diarrhea.   Patient denies any leg swelling, orthopnea, paroxysmal nocturnal dyspnea.  Patient is able to perform activities of daily living.        Review of Systems     Family History   Problem Relation Age of Onset    Coronary artery disease Mother     Hypertension Mother     Diabetes type II Mother     Asthma Father     Diabetes type II Sister     Cancer Sister     Malig Hyperthermia Neg Hx     Colon cancer Neg Hx         Social History     Socioeconomic History    Marital status:    Tobacco Use    Smoking status: Former     Current packs/day: 0.00     Average packs/day: 1 pack/day for 12.0 years (12.0 ttl pk-yrs)     Types: Cigarettes     Start date:      Quit date:      Years since quittin.5     Passive exposure: Past    Smokeless tobacco: Never   Vaping Use    Vaping status: Never Used   Substance and Sexual Activity    Alcohol use: Not Currently    Drug use: Never    Sexual activity: Defer        Past Medical History:   Diagnosis Date    1. Incision and drainage of posterior perianal abscess 2. Sharp excisional debridement through skin and subcutaneous tissue in the posterior perianal area, 9 x 6 x 1 cm 2025    Age-related cognitive decline     Allergic contact dermatitis     Allergies     Anemia     Asthma     Bedbound     2023 \"MY LEG MUSCLES STOPPED WORKING\"    Bronchiectasis with acute lower respiratory infection     Charcot foot due to diabetes mellitus 09/10/2013    Chronic diastolic (congestive) heart failure     Chronic kidney disease     Chronic " "respiratory failure with hypoxia     Closed supracondylar fracture of femur 01/12/2022    COPD (chronic obstructive pulmonary disease)     Deep vein thrombosis (DVT) of lower extremity associated with air travel 01/13/2023    Dependence on supplemental oxygen     Eczema     Elevated cholesterol     Erectile dysfunction     due to organic reasons    Essential (primary) hypertension     Fracture     closed fracture of other tarsal and metatarsal bones    Fracture of proximal humerus 01/13/2023    GERD without esophagitis     High risk medication use     Hypercholesteremia     Hypomagnesemia     Infected stasis ulcer of left lower extremity 01/13/2023    Insomnia     Low back pain     Major depressive disorder     Morbid (severe) obesity due to excess calories     MRSA pneumonia     Muscle weakness     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 without evidence of necrosis     Obstructive sleep apnea (adult) (pediatric)     On home O2     REPORTS WEARING 2L/NC AAT    Other forms of dyspnea     Other long term (current) drug therapy     Other specified noninfective gastroenteritis and colitis     Other spondylosis, lumbar region     Pain in both knees     Paroxysmal atrial fibrillation     Peripheral neuropathy     attributed to type 2 diabetes    Pneumonia, unspecified organism     Polyneuropathy     Rash and other nonspecific skin eruption     Self-catheterizes urinary bladder     EVERY 4 HOURS    Smoking     \"SOMETIMES\"    Syncope and collapse     Tachycardia     Tinnitus 01/13/2023    Type 1 diabetes mellitus with diabetic chronic kidney disease     Type 2 diabetes mellitus     Unspecified fall, initial encounter     Urinary retention         Immunization History   Administered Date(s) Administered    COVID-19 (PFIZER) 12YRS+ (COMIRNATY) 10/22/2024    Flu Vaccine Quad PF >36MO 10/18/2016, 10/16/2017, 11/04/2019    Flu Vaccine Split Quad 11/04/2019    Fluzone  >6mos 09/19/2013 "    Fluzone (or Fluarix & Flulaval for VFC) >6mos 09/19/2013, 10/18/2016, 10/16/2017, 11/04/2019, 10/19/2022, 11/14/2023    Fluzone High-Dose 65+YRS 10/22/2024    Influenza Injectable Mdck Pf Quad 10/19/2022    Influenza, Unspecified 10/18/2016, 10/16/2017, 11/04/2019, 09/21/2020    PEDS-Pneumococcal Conjugate (PCV7) 11/14/2023    Pneumococcal Conjugate 20-Valent (PCV20) 11/14/2023    Pneumococcal Polysaccharide (PPSV23) 11/20/1997    Shingrix 07/11/2024, 10/22/2024    Tdap 09/18/2018    influenza Split 11/04/2019       Allergies   Allergen Reactions    Benadryl [Diphenhydramine] Itching    Proventil [Albuterol] Other (See Comments)     Mouth sores            Current Outpatient Medications:     albuterol sulfate  (90 Base) MCG/ACT inhaler, Inhale 2 puffs Every 4 (Four) Hours As Needed for Wheezing., Disp: 54 g, Rfl: 1    amantadine (SYMMETREL) 100 MG tablet, , Disp: , Rfl:     apixaban (Eliquis) 5 MG tablet tablet, Take 1 tablet by mouth Every 12 (Twelve) Hours., Disp: 60 tablet, Rfl: 5    arformoterol (BROVANA) 15 MCG/2ML nebulizer solution, Take 2 mL by nebulization 2 (Two) Times a Day., Disp: 360 mL, Rfl: 3    aspirin (Aspirin Low Dose) 81 MG EC tablet, Take 1 tablet by mouth Every Morning., Disp: 30 tablet, Rfl: 5    atorvastatin (LIPITOR) 20 MG tablet, Take 1 tablet by mouth every night at bedtime., Disp: 30 tablet, Rfl: 5    budesonide (Pulmicort) 0.5 MG/2ML nebulizer solution, Take 2 mL by nebulization 2 (Two) Times a Day., Disp: 360 mL, Rfl: 3    busPIRone (BUSPAR) 15 MG tablet, Take 1 tablet by mouth 2 (Two) Times a Day., Disp: 60 tablet, Rfl: 5    calcium citrate (CALCITRATE) 950 (200 Ca) MG tablet, Take 1 tablet by mouth every night at bedtime., Disp: 30 tablet, Rfl: 5    Cholecalciferol (Vitamin D3) 50 MCG (2000 UT) tablet, Take 1 tablet by mouth Every Morning., Disp: 30 tablet, Rfl: 5    collagenase (Santyl) 250 UNIT/GM ointment, Apply 1 Application topically to the appropriate area as directed  Daily., Disp: 30 g, Rfl: 1    Continuous Glucose Sensor (FreeStyle Bethany 3 Plus Sensor), Use Every 15 (Fifteen) Days., Disp: 2 each, Rfl: 5    dapagliflozin (Farxiga) 5 MG tablet tablet, Take 1 tablet by mouth Every Morning., Disp: 30 tablet, Rfl: 5    dilTIAZem CD (CARDIZEM CD) 240 MG 24 hr capsule, Take 1 capsule by mouth Every Morning., Disp: 30 capsule, Rfl: 5    docusate sodium (COLACE) 100 MG capsule, Take 1 capsule by mouth 2 (Two) Times a Day., Disp: 60 capsule, Rfl: 5    DULoxetine (Cymbalta) 30 MG capsule, Take 1 capsule by mouth Daily., Disp: 30 capsule, Rfl: 5    famotidine (Pepcid) 20 MG tablet, Take 1 tablet by mouth At Night As Needed for Indigestion or Heartburn., Disp: , Rfl:     ferrous gluconate (FERGON) 324 MG tablet, Take 1 tablet by mouth Every Morning., Disp: 30 tablet, Rfl: 5    ferrous sulfate 325 (65 FE) MG tablet, Take 1 tablet by mouth Daily With Breakfast., Disp: , Rfl:     finasteride (PROSCAR) 5 MG tablet, Take 1 tablet by mouth every night at bedtime., Disp: 30 tablet, Rfl: 5    fluconazole (DIFLUCAN) 150 MG tablet, Take 1 tablet by mouth Daily., Disp: 3 tablet, Rfl: 0    folic acid (FOLVITE) 1 MG tablet, Take 1 tablet by mouth every night at bedtime., Disp: 30 tablet, Rfl: 5    hydrALAZINE (APRESOLINE) 25 MG tablet, Take 1 tablet by mouth 3 (Three) Times a Day., Disp: , Rfl:     Insulin Glargine (BASAGLAR KWIKPEN) 100 UNIT/ML injection pen, Inject 15 Units under the skin into the appropriate area as directed Daily for 180 days., Disp: 15 mL, Rfl: 1    Insulin Lispro, 1 Unit Dial, (HUMALOG) 100 UNIT/ML solution pen-injector, Inject 15 Units under the skin into the appropriate area as directed 3 (Three) Times a Day Before Meals for 180 days., Disp: 40 mL, Rfl: 1    ipratropium-albuterol (DUO-NEB) 0.5-2.5 mg/3 ml nebulizer, Take 3 mL by nebulization Every 4 (Four) Hours As Needed for Wheezing or Shortness of Air., Disp: 360 mL, Rfl: 5    Magnesium Oxide -Mg Supplement 400 (240 Mg) MG  tablet, Take 1 tablet by mouth every night at bedtime., Disp: 30 tablet, Rfl: 5    Menthol-Zinc Oxide 0.44-20.6 % ointment, Apply 1 Application topically to the appropriate area as directed 2 (Two) Times a Day. With calamine, i.e. Calmoseptine, Disp: 113 g, Rfl: 1    metoprolol tartrate (LOPRESSOR) 50 MG tablet, Take 1 tablet by mouth 2 (Two) Times a Day., Disp: , Rfl:     montelukast (SINGULAIR) 10 MG tablet, Take 1 tablet by mouth Every Night., Disp: 30 tablet, Rfl: 5    Multiple Vitamins-Iron (Multi-Vitamin/Iron) tablet, Take 1 tablet by mouth Every Morning., Disp: 30 each, Rfl: 5    Nutritional Supplements (Rosalino Nutrivigor) pack, Take 1 packet by mouth 2 (Two) Times a Day., Disp: 180 each, Rfl: 1    ondansetron ODT (ZOFRAN-ODT) 4 MG disintegrating tablet, Place 1 tablet on the tongue Every 8 (Eight) Hours As Needed for Nausea or Vomiting., Disp: 20 tablet, Rfl: 0    pantoprazole (PROTONIX) 40 MG EC tablet, Take 1 tablet by mouth Every Morning., Disp: 30 tablet, Rfl: 5    Povidone-Iodine (Betadine) 5 % external solution 5%, Apply 1 mL topically to the appropriate area as directed 2 (Two) Times a Day. Left Heel, Disp: , Rfl:     Semaglutide, 1 MG/DOSE, (Ozempic, 1 MG/DOSE,) 4 MG/3ML solution pen-injector, Inject 1 mg under the skin into the appropriate area as directed 1 (One) Time Per Week., Disp: 3 mL, Rfl: 5    silver sulfadiazine (SILVADENE, SSD) 1 % cream, Apply  topically to the appropriate area as directed., Disp: , Rfl:     sodium hypochlorite (DAKIN'S 1/4 STRENGTH) 0.125 % solution topical solution 0.125%, Apply 10 mL topically to the appropriate area as directed 2 (Two) Times a Day., Disp: 1000 mL, Rfl: 1    tamsulosin (FLOMAX) 0.4 MG capsule 24 hr capsule, Take 1 capsule by mouth every night at bedtime., Disp: 30 capsule, Rfl: 5    Tobramycin 300 MG/4ML nebulizer solution, Inhale 2 (Two) Times a Day., Disp: , Rfl:     vitamin C (ASCORBIC ACID) 500 MG tablet, Take 1 tablet by mouth 2 (Two) Times a Day.,  "Disp: 60 tablet, Rfl: 5    zinc sulfate (ZINCATE) 220 (50 Zn) MG capsule, Take 1 capsule by mouth Every Morning., Disp: 30 capsule, Rfl: 5     Objective     Physical Exam  Constitutional:       Appearance: Normal appearance.   HENT:      Head: Normocephalic and atraumatic.      Mouth/Throat:      Mouth: Mucous membranes are moist.      Pharynx: Oropharynx is clear.   Eyes:      Extraocular Movements: Extraocular movements intact.      Conjunctiva/sclera: Conjunctivae normal.      Pupils: Pupils are equal, round, and reactive to light.   Cardiovascular:      Rate and Rhythm: Normal rate and regular rhythm.   Pulmonary:      Effort: Pulmonary effort is normal.      Comments: Diminished breath sounds bilaterally. No wheezes, rales, or rhonchi appreciated.  Normal work of breathing noted.  Patient is able speak full sentences without difficulty.  Patient is on 2 L of oxygen per minute via nasal cannula.  Skin:     General: Skin is warm and dry.   Neurological:      General: No focal deficit present.      Mental Status: He is alert and oriented to person, place, and time.   Psychiatric:         Mood and Affect: Mood normal.         Behavior: Behavior normal.           /54 (BP Location: Left arm, Patient Position: Sitting, Cuff Size: Adult)   Pulse 87   Temp 98.3 °F (36.8 °C) (Oral)   Resp 16   Ht 175.3 cm (69.02\")   SpO2 98% Comment: 2 liters  BMI 36.16 kg/m²         Result Review :   I have reviewed [facility] last office visit note.  I also reviewed chest CT scan report dated from 4/22/2025.  See scanned report.    Results        Procedures:              Assessment and Plan      Assessment:  Diagnoses and all orders for this visit:    1. Chronic obstructive pulmonary disease, unspecified COPD type (Primary)  -     CT Chest Without Contrast; Future    2. Obstructive sleep apnea  -     CT Chest Without Contrast; Future    3. Chronic dyspnea  -     CT Chest Without Contrast; Future    4. " Bronchiectasis without complication  -     CT Chest Without Contrast; Future    5. Chronic respiratory failure with hypoxia and hypercapnia  -     CT Chest Without Contrast; Future    6. Tobacco abuse, in remission  -     CT Chest Without Contrast; Future    7. Personal history of PE (pulmonary embolism)  -     CT Chest Without Contrast; Future    8. History of Pseudomonas pneumonia  -     CT Chest Without Contrast; Future    9. Pneumonia due to Pseudomonas species, unspecified laterality, unspecified part of lung  -     CT Chest Without Contrast; Future    Other orders  -     albuterol sulfate  (90 Base) MCG/ACT inhaler; Inhale 2 puffs Every 4 (Four) Hours As Needed for Wheezing.  Dispense: 54 g; Refill: 1         Assessment & Plan  1. Bronchiectasis.  Patient is not able to currently use his chest vest due to having a colostomy bag in place.  Patient states that he may be having his colostomy bag reversed.  Patient states that he does have a flutter valve to use to assist with airway clearance.  Patient is advised to continue flutter valve with nebulizer treatments to assist with airway clearance.  Will order a follow-up chest CT scan to be completed in August 2025.  Order placed today.    2.  History of Pseudomonas pneumonia.    Patient is currently on tobramycin nebulizer treatments every other month and is advised to continue tobramycin nebulizer treatments as prescribed.    3.  Very severe COPD.  Patient to continue Brovana and Pulmicort nebulizer treatments twice daily as prescribed and rinse mouth out after each use.  Continue albuterol inhaler and DuoNeb nebulizer treatments as needed.  Patient to continue singular.  Patient to continue oxygen to keep SPO2 at 89% and above.    4.  Vaccination status: patient reports they are up-to-date with flu, pneumonia, and did receive 1 Covid vaccine.      5.  Smoking status: Patient is a former cigarette smoker.  Not eligible for lung cancer screening as patient  reports that he quit smoking back in 1993.    6.  Patient to call the office, 911, or go to the ER with new or worsening symptoms.    7.  Follow-up in October 2025, sooner if needed.              Follow Up   Return for October 2025 in [place].  Patient was given instructions and counseling regarding his condition or for health maintenance advice. Please see specific information pulled into the AVS if appropriate.

## 2025-03-07 ENCOUNTER — PATIENT MESSAGE (OUTPATIENT)
Dept: FAMILY MEDICINE CLINIC | Age: 60
End: 2025-03-07
Payer: COMMERCIAL

## 2025-03-07 LAB
MYCOBACTERIUM SPEC CULT: NORMAL
NIGHT BLUE STAIN TISS: NORMAL
NIGHT BLUE STAIN TISS: NORMAL

## 2025-03-09 LAB
MYCOBACTERIUM SPEC CULT: NORMAL
NIGHT BLUE STAIN TISS: NORMAL

## 2025-03-10 ENCOUNTER — OFFICE VISIT (OUTPATIENT)
Dept: SURGERY | Facility: CLINIC | Age: 60
End: 2025-03-10
Payer: COMMERCIAL

## 2025-03-10 VITALS — WEIGHT: 296 LBS | BODY MASS INDEX: 43.84 KG/M2 | HEIGHT: 69 IN

## 2025-03-10 DIAGNOSIS — Z93.3 COLOSTOMY IN PLACE: Primary | ICD-10-CM

## 2025-03-10 PROCEDURE — 1160F RVW MEDS BY RX/DR IN RCRD: CPT | Performed by: NURSE PRACTITIONER

## 2025-03-10 PROCEDURE — 99024 POSTOP FOLLOW-UP VISIT: CPT | Performed by: NURSE PRACTITIONER

## 2025-03-10 PROCEDURE — 1159F MED LIST DOCD IN RCRD: CPT | Performed by: NURSE PRACTITIONER

## 2025-03-10 RX ORDER — KETOROLAC TROMETHAMINE 30 MG/ML
INJECTION, SOLUTION INTRAMUSCULAR; INTRAVENOUS SEE ADMIN INSTRUCTIONS
Status: ON HOLD | COMMUNITY
Start: 2024-12-17 | End: 2025-03-17

## 2025-03-10 RX ORDER — GABAPENTIN 300 MG/1
300 CAPSULE ORAL
Status: ON HOLD | COMMUNITY
Start: 2024-12-02 | End: 2025-03-17

## 2025-03-10 RX ORDER — INSULIN DETEMIR 100 [IU]/ML
INJECTION, SOLUTION SUBCUTANEOUS
Status: ON HOLD | COMMUNITY
Start: 2025-02-26 | End: 2025-03-17

## 2025-03-10 RX ORDER — METOPROLOL TARTRATE 100 MG/1
100 TABLET ORAL
Status: ON HOLD | COMMUNITY
Start: 2024-12-02 | End: 2025-03-17

## 2025-03-10 RX ORDER — METFORMIN HYDROCHLORIDE 500 MG/1
500 TABLET, EXTENDED RELEASE ORAL
Status: ON HOLD | COMMUNITY
Start: 2024-12-02 | End: 2025-03-17

## 2025-03-10 RX ORDER — LISINOPRIL 10 MG/1
10 TABLET ORAL DAILY
Status: ON HOLD | COMMUNITY
End: 2025-03-17

## 2025-03-10 RX ORDER — DILTIAZEM HYDROCHLORIDE 240 MG/1
1 CAPSULE, COATED, EXTENDED RELEASE ORAL DAILY
Status: ON HOLD | COMMUNITY
Start: 2024-12-02 | End: 2025-03-17

## 2025-03-10 RX ORDER — FERROUS SULFATE 325(65) MG
325 TABLET ORAL
Status: ON HOLD | COMMUNITY
Start: 2024-12-02 | End: 2025-03-17

## 2025-03-10 RX ORDER — HYDRALAZINE HYDROCHLORIDE 50 MG/1
50 TABLET, FILM COATED ORAL
Status: ON HOLD | COMMUNITY
Start: 2024-12-02 | End: 2025-03-17

## 2025-03-10 NOTE — PROGRESS NOTES
"Chief Complaint: Suture / Staple Removal    Subjective      Follow-up visit       History of Present Illness  Preston Wallis is a 59 y.o. male presents to [facility] GENERAL SUR[facility]ERY for follow-up visit.  Patient underwent a laparoscopic colostomy on 2/6/2025 performed by Dr. Emerson Canada.  Patient has been in a skilled nursing facility.  He presents today to have his staples removed.  He currently denies any fever or chills.  Without complaints today.      Admits to tolerating his diet well.  Colostomy has formed stool    Objective     Past Medical History:   Diagnosis Date    1. Incision and drainage of posterior perianal abscess 2. Sharp excisional debridement through skin and subcutaneous tissue in the posterior perianal area, 9 x 6 x 1 cm 01/26/2025    Age-related cognitive decline     Allergic contact dermatitis     Allergies     Anemia     Bedbound     11/2023 \"MY LEG MUSCLES STOPPED WORKING\"    Bronchiectasis with acute lower respiratory infection     Charcot foot due to diabetes mellitus 09/10/2013    Chronic diastolic (congestive) heart failure     Chronic kidney disease     Chronic respiratory failure with hypoxia     Closed supracondylar fracture of femur 01/12/2022    COPD (chronic obstructive pulmonary disease)     Deep vein thrombosis (DVT) of lower extremity associated with air travel 01/13/2023    Dependence on supplemental oxygen     Eczema     Erectile dysfunction     due to organic reasons    Essential (primary) hypertension     Fracture     closed fracture of other tarsal and metatarsal bones    Fracture of proximal humerus 01/13/2023    GERD without esophagitis     High risk medication use     Hypercholesteremia     Hypomagnesemia     Infected stasis ulcer of left lower extremity 01/13/2023    Insomnia     Low back pain     Major depressive disorder     Morbid (severe) obesity due to excess calories     MRSA pneumonia     Muscle weakness     Non-pressure chronic ulcer of other " "part of unspecified foot with bone involvement without evidence of necrosis     Obstructive sleep apnea (adult) (pediatric)     On home O2     REPORTS WEARING 2L/NC AAT    Other forms of dyspnea     Other long term (current) drug therapy     Other specified noninfective gastroenteritis and colitis     Other spondylosis, lumbar region     Pain in both knees     Paroxysmal atrial fibrillation     Peripheral neuropathy     attributed to type 2 diabetes    Pneumonia, unspecified organism     Polyneuropathy     Rash and other nonspecific skin eruption     Self-catheterizes urinary bladder     EVERY 4 HOURS    Smoking     \"SOMETIMES\"    Syncope and collapse     Tachycardia     Tinnitus 01/13/2023    Type 1 diabetes mellitus with diabetic chronic kidney disease     Type 2 diabetes mellitus     Unspecified fall, initial encounter     Urinary retention        Past Surgical History:   Procedure Laterality Date    BRONCHOSCOPY N/A 1/28/2025    Procedure: BRONCHOSCOPY: BAL: insertion of lighted instrument to view inside the lung;  Surgeon: Walter Nicole DO;  Location: [facility] OR;  Service: Pulmonary;  Laterality: N/A;    BRONCHOSCOPY N/A 2/3/2025    Procedure: BRONCHOSCOPY WITH BRONCHOALVEOLAR LAVAGE, POSSIBLE BIOPSY, BRUSHING, WASHING, AIRWAY INSPECTION: insertion of lighted instrument to view inside the lung;  Surgeon: Chadd Swan MD;  Location: [facility] MAIN OR;  Service: Pulmonary;  Laterality: N/A;    CARDIAC CATHETERIZATION Left 8/15/2024    Procedure: Carbon dioxide aortogram with left leg angiogram, possible angioplasty or stenting;  Surgeon: Moshe Willson MD;  Location: [facility] CATH INVASIVE LOCATION;  Service: Vascular;  Laterality: Left;    CHOLECYSTECTOMY      COLOSTOMY N/A 2/6/2025    Procedure: COLOSTOMY LAPAROSCOPIC; plain text: creation of colostomy using small incisions;  Surgeon: Emerson Canada MD;  Location: [facility] OR OSC;  Service: General;  Laterality: N/A;    CYSTOSCOPY      FEMUR " SURGERY Left     Shravan placed    INCISION AND DRAINAGE ABSCESS N/A 1/26/2025    Procedure: INCISION AND DRAINAGE ABSCESS; plain text: incision and drain pus from buttocks wound;  Surgeon: Emerson Canada MD;  Location: [facility] OR [place];  Service: General;  Laterality: N/A;    KNEE SURGERY Left     OTHER SURGICAL HISTORY Left     venous port, REMOVED    PORTACATH PLACEMENT Right     TIBIAL PLATEAU OPEN REDUCTION INTERNAL FIXATION Left 12/22/2023    Procedure: TIBIAL PLATEAU OPEN REDUCTION INTERNAL FIXATION;  Surgeon: Hugo Kline MD;  Location: [facility] OR;  Service: Orthopedics;  Laterality: Left;    TONSILLECTOMY AND ADENOIDECTOMY         Outpatient Medications Marked as Taking for the 3/10/25 encounter (Office Visit) with Connor April, APRN   Medication Sig Dispense Refill    amoxicillin-clavulanate (AUGMENTIN) 875-125 MG per tablet       arformoterol (BROVANA) 15 MCG/2ML nebulizer solution Take 2 mL by nebulization 2 (Two) Times a Day. 360 mL 3    Aspirin Low Dose 81 MG EC tablet TAKE 1 TABLET BY MOUTH EVERY MORNING 30 tablet 3    atorvastatin (LIPITOR) 20 MG tablet TAKE 1 TABLET BY MOUTH EVERY NIGHT AT BEDTIME 30 tablet 3    budesonide (Pulmicort) 0.5 MG/2ML nebulizer solution Take 2 mL by nebulization 2 (Two) Times a Day. 360 mL 3    busPIRone (BUSPAR) 15 MG tablet TAKE 1 TABLET BY MOUTH TWICE DAILY 60 tablet 3    calcium citrate (CALCITRATE) 950 (200 Ca) MG tablet TAKE 1 TABLET BY MOUTH EVERY NIGHT AT BEDTIME 30 tablet 3    Cholecalciferol (Vitamin D3) 50 MCG (2000 UT) tablet TAKE 1 TABLET BY MOUTH EVERY MORNING 30 tablet 3    collagenase 250 UNIT/GM ointment Apply 1 Application topically to the appropriate area as directed As Needed (To be applied to figueroa-rectal wound with wound VAC dressing changes three times a week as needed for slough.). 90 g 1    collagenase 250 UNIT/GM ointment Apply 1 Application topically to the appropriate area as directed Daily.      Continuous Glucose  (FreeStyle  [place] 3 [place]) device See Admin Instructions.      Continuous Glucose Sensor (FreeStyle Beth[place] 3 Plus Sensor) Use 2 each Every 30 (Thirty) Days. Apply as directed every 15 days 2 each 5    dilTIAZem CD (CARDIZEM CD) 240 MG 24 hr capsule Take 1 capsule by mouth Daily.      docusate sodium (COLACE) 100 MG capsule TAKE 1 CAPSULE BY MOUTH TWICE DAILY 60 capsule 3    Eliquis 5 MG tablet tablet TAKE 1 TABLET BY MOUTH EVERY TWELVE HOURS 60 tablet 3    famotidine (PEPCID) 40 MG tablet TAKE 1 TABLET BY MOUTH EVERY MORNING 30 tablet 3    Farxiga 5 MG tablet tablet TAKE 1 TABLET BY MOUTH EVERY MORNING 30 tablet 3    ferrous gluconate (FERGON) 324 MG tablet TAKE 1 TABLET BY MOUTH EVERY MORNING 30 tablet 3    ferrous sulfate 325 (65 FE) MG tablet Take 1 tablet by mouth.      finasteride (PROSCAR) 5 MG tablet TAKE 1 TABLET BY MOUTH EVERY NIGHT AT BEDTIME 30 tablet 3    folic acid (FOLVITE) 1 MG tablet TAKE 1 TABLET BY MOUTH EVERY NIGHT AT BEDTIME 30 tablet 3    gabapentin (NEURONTIN) 300 MG capsule Take 1 capsule by mouth.      hydrALAZINE (APRESOLINE) 50 MG tablet Take 1 tablet by mouth.      insulin detemir (Levemir) 100 UNIT/ML injection Inject 5 Units under the skin into the appropriate area as directed Every Night for 180 days. 15 mL 1    Insulin Lispro, 1 Unit Dial, (HUMALOG) 100 UNIT/ML solution pen-injector Inject 5 Units under the skin into the appropriate area as directed 3 (Three) Times a Day Before Meals. 15 mL 0    ipratropium-albuterol (DUO-NEB) 0.5-2.5 mg/3 ml nebulizer Take 3 mL by nebulization Every 4 (Four) Hours As Needed for Wheezing or Shortness of Air. 360 mL 5    Levemir FlexPen 100 UNIT/ML injection       lisinopril (PRINIVIL,ZESTRIL) 10 MG tablet Take 1 tablet by mouth Daily.      Magnesium Oxide -Mg Supplement 400 (240 Mg) MG tablet Take 1 tablet by mouth every night at bedtime.      metFORMIN ER (GLUCOPHAGE-XR) 500 MG 24 hr tablet Take 1 tablet by mouth.      metoprolol tartrate (LOPRESSOR) 100 MG  tablet Take 1 tablet by mouth.      montelukast (SINGULAIR) 10 MG tablet Take 1 tablet by mouth Every Night. 30 tablet 5    Multiple Vitamins-Iron (GNP One Daily Plus Iron) tablet TAKE 1 TABLET BY MOUTH EVERY MORNING 30 each 3    O2 (OXYGEN) 2 Liter O2 - CONTINUOUS (route: Oxygen)      Semaglutide, 1 MG/DOSE, (Ozempic, 1 MG/DOSE,) 4 MG/3ML solution pen-injector Inject 1 mg under the skin into the appropriate area as directed 1 (One) Time Per Week. 3 mL 5    tamsulosin (FLOMAX) 0.4 MG capsule 24 hr capsule TAKE 1 CAPSULE BY MOUTH EVERY NIGHT AT BEDTIME 30 capsule 3    tiotropium (SPIRIVA) 18 MCG per inhalation capsule Place 1 capsule into inhaler and inhale Daily. 30 capsule 5    tobramycin PF (MOIZ) 300 MG/5ML nebulizer solution Take 5 mL by nebulization 2 (Two) Times a Day. nebulize 1 vial BY MOUTH TWICE DAILY FOR 28 DAYS ON AND 28 DAYS OFF      vitamin C (ASCORBIC ACID) 500 MG tablet TAKE 1 TABLET BY MOUTH TWICE DAILY 60 tablet 3       Allergies   Allergen Reactions    Benadryl [Diphenhydramine] Itching    Proventil [Albuterol] Other (See Comments)     Mouth sores          Family History   Problem Relation Age of Onset    Coronary artery disease Mother     Hypertension Mother     Diabetes type II Mother     Asthma Father     Diabetes type II Sister     Cancer Sister     Malig Hyperthermia Neg Hx        Social History     Socioeconomic History    Marital status:    Tobacco Use    Smoking status: Former     Current packs/day: 0.00     Average packs/day: 1 pack/day for 12.0 years (12.0 ttl pk-yrs)     Types: Cigarettes     Start date:      Quit date:      Years since quittin.2     Passive exposure: Past    Smokeless tobacco: Never   Vaping Use    Vaping status: Never Used   Substance and Sexual Activity    Alcohol use: Not Currently    Drug use: Never    Sexual activity: Defer       Review of Systems   Constitutional:  Negative for chills and fever.   Gastrointestinal:  Negative for abdominal  "distention, abdominal pain, anal bleeding, blood in stool, constipation, diarrhea, nausea, rectal pain and vomiting.        Vital Signs:   Ht 175.3 cm (69\")   Wt 134 kg (296 lb)   BMI 43.71 kg/m²      Physical Exam  Vitals and nursing note reviewed.   Constitutional:       General: He is not in acute distress.     Appearance: He is obese. He is not ill-appearing.      Comments: On stretcher   HENT:      Head: Normocephalic and atraumatic.   Cardiovascular:      Rate and Rhythm: Normal rate.   Pulmonary:      Effort: Pulmonary effort is normal.   Abdominal:      Palpations: Abdomen is soft.      Comments: Umbilicus--surgical incision is clean dry and intact without erythema Staples are present.    Right lower abdomen-lap site is clean dry and intact without erythema.  Staples are present.    Today I remove all staples.    Colostomy is present with brown formed stool.   Musculoskeletal:         General: No deformity. Normal range of motion.   Skin:     General: Skin is warm and dry.   Neurological:      General: No focal deficit present.      Mental Status: He is alert and oriented to person, place, and time.   Psychiatric:         Mood and Affect: Mood normal.         Thought Content: Thought content normal.          Result Review :          []  Laboratory  []  Radiology  []  Pathology  []  Microbiology  []  EKG/Telemetry   []  Cardiology/Vascular   []  Old records  Today reviewed Dr. Pearl's operative and pathology report     Assessment and Plan    Diagnoses and all orders for this visit:    1. Colostomy in place (Primary)        Follow Up   Return for Follow-up with Dr. Canada in 2 weeks.    Today I did call the skilled nursing facility and spoke with Joann.  I did give verbal orders for the patient to have his wound VAC changed 3 times a week.  Orders were read back and verified.    Seek medical emergency services:  Fever greater than 101 associated with chills.  Extreme pain.  Patient to call the office, " 911, or go to the ER with new or worsening symptoms.    Patient was given instructions and counseling regarding his condition or for health maintenance advice. Please see specific information pulled into the AVS if appropriate.     As always, it has been a pleasure to participate in your patient's care. Please call with questions or concerns.

## 2025-03-11 LAB
MYCOBACTERIUM SPEC CULT: NORMAL
NIGHT BLUE STAIN TISS: NORMAL
NIGHT BLUE STAIN TISS: NORMAL

## 2025-03-14 ENCOUNTER — OFFICE VISIT (OUTPATIENT)
Dept: DIABETES SERVICES | Facility: CLINIC | Age: 60
End: 2025-03-14
Payer: COMMERCIAL

## 2025-03-14 VITALS
DIASTOLIC BLOOD PRESSURE: 50 MMHG | HEIGHT: 69 IN | HEART RATE: 93 BPM | BODY MASS INDEX: 33.77 KG/M2 | SYSTOLIC BLOOD PRESSURE: 130 MMHG | OXYGEN SATURATION: 98 % | WEIGHT: 228 LBS

## 2025-03-14 DIAGNOSIS — Z79.4 CONTROLLED TYPE 2 DIABETES MELLITUS WITH DIABETIC POLYNEUROPATHY, WITH LONG-TERM CURRENT USE OF INSULIN: Primary | ICD-10-CM

## 2025-03-14 DIAGNOSIS — E11.22 TYPE 2 DIABETES MELLITUS WITH STAGE 4 CHRONIC KIDNEY DISEASE, WITH LONG-TERM CURRENT USE OF INSULIN: ICD-10-CM

## 2025-03-14 DIAGNOSIS — Z79.4 TYPE 2 DIABETES MELLITUS WITH STAGE 4 CHRONIC KIDNEY DISEASE, WITH LONG-TERM CURRENT USE OF INSULIN: ICD-10-CM

## 2025-03-14 DIAGNOSIS — E11.621 DIABETIC ULCER OF LEFT HEEL ASSOCIATED WITH TYPE 2 DIABETES MELLITUS, UNSPECIFIED ULCER STAGE: ICD-10-CM

## 2025-03-14 DIAGNOSIS — L97.429 DIABETIC ULCER OF LEFT HEEL ASSOCIATED WITH TYPE 2 DIABETES MELLITUS, UNSPECIFIED ULCER STAGE: ICD-10-CM

## 2025-03-14 DIAGNOSIS — E11.42 CONTROLLED TYPE 2 DIABETES MELLITUS WITH DIABETIC POLYNEUROPATHY, WITH LONG-TERM CURRENT USE OF INSULIN: Primary | ICD-10-CM

## 2025-03-14 DIAGNOSIS — N18.4 TYPE 2 DIABETES MELLITUS WITH STAGE 4 CHRONIC KIDNEY DISEASE, WITH LONG-TERM CURRENT USE OF INSULIN: ICD-10-CM

## 2025-03-14 DIAGNOSIS — Z79.4 TYPE 2 DIABETES MELLITUS WITH BOTH EYES AFFECTED BY MILD NONPROLIFERATIVE RETINOPATHY WITHOUT MACULAR EDEMA, WITH LONG-TERM CURRENT USE OF INSULIN: ICD-10-CM

## 2025-03-14 DIAGNOSIS — E11.3293 TYPE 2 DIABETES MELLITUS WITH BOTH EYES AFFECTED BY MILD NONPROLIFERATIVE RETINOPATHY WITHOUT MACULAR EDEMA, WITH LONG-TERM CURRENT USE OF INSULIN: ICD-10-CM

## 2025-03-14 LAB
EXPIRATION DATE: ABNORMAL
HBA1C MFR BLD: 7 % (ref 4.5–5.7)
Lab: ABNORMAL

## 2025-03-14 PROCEDURE — 3075F SYST BP GE 130 - 139MM HG: CPT | Performed by: NURSE PRACTITIONER

## 2025-03-14 PROCEDURE — 3078F DIAST BP <80 MM HG: CPT | Performed by: NURSE PRACTITIONER

## 2025-03-14 PROCEDURE — 99214 OFFICE O/P EST MOD 30 MIN: CPT | Performed by: NURSE PRACTITIONER

## 2025-03-14 PROCEDURE — 1159F MED LIST DOCD IN RCRD: CPT | Performed by: NURSE PRACTITIONER

## 2025-03-14 PROCEDURE — 3051F HG A1C>EQUAL 7.0%<8.0%: CPT | Performed by: NURSE PRACTITIONER

## 2025-03-14 PROCEDURE — 1160F RVW MEDS BY RX/DR IN RCRD: CPT | Performed by: NURSE PRACTITIONER

## 2025-03-14 RX ORDER — HYDROCHLOROTHIAZIDE 12.5 MG/1
2 CAPSULE ORAL
Qty: 2 EACH | Refills: 5 | Status: ON HOLD | OUTPATIENT
Start: 2025-03-14 | End: 2025-03-17

## 2025-03-14 NOTE — PROGRESS NOTES
Chief Complaint  Diabetes (Med management, A1C, CGM Eval, does not have CGM reader with person today, states medications should be accurate but is not completely sure since being in a nursing home facility.)    Referred By: Kimmy Riley MD    Subjective          Patient or patient representative verbalized consent for the use of Ambient Listening during the visit with  CON Ashley for chart documentation. 3/26/2025  14:41 EDT    Preston Wallis presents to [facility] for diabetes medication management    History of Present Illness    Visit type:  follow-up  Diabetes type:  Type 2  Current diabetes status/concerns/issues:     History of Present Illness  The patient presents for evaluation of diabetes mellitus.    He is currently residing in a rehab facility for wound care.  He has been monitoring his diabetes with a continuous glucose monitor (CGM), although he currently lacks a sensor. His blood glucose levels have been inconsistent, with one episode of hypoglycemia characterized by symptoms of shakiness and sweating. His blood glucose levels have ranged from the 130s to as high as 280s. He is not receiving Levemir insulin at his current facility. His current medication regimen includes Farxiga 5 mg daily and Lispro insulin 5 units administered three times daily before meals. He has not been on Ozempic 1 mg once weekly for some time.    Supplemental Information  He had surgery for a perianal abscess and a colostomy was performed to divert stool and aid in healing. He did have a wound VAC, but the wound doctor at [facility] told him that they were not using it correctly so it has been removed. He receives wound care every 3 days at the facility. He had pneumonia in 01/2025.        Current Diabetes symptoms:    Polyuria: No   Polydipsia: No   Polyphagia: No   Blurred vision: No   Excessive fatigue: No  Known Diabetes complications:  Neuropathy: Numbness, Tingling,  "Shooting Pain and Temperature variation (hot or cold sensations); Location: Feet  Renal: No current urine microalbumin available and Stage IV severe (GFR = 15-29 mL/min)  Eyes: Mild Nonproliferative Retinopathy and Without Macular Edema; Location: Bilateral; Date of Last Exam: 11/1/23  Amputation/Wounds: He has a pressure injury of the left heel and then a small pressure injury of the left foot  GI: None  Cardiovascular: Hypertension, Hyperlipidemia and CHF  ED: None  Other: Neurogenic bladder  Hypoglycemia:  None reported at this time  Hypoglycemia Symptoms:  No hypoglycemia at this time  Current diabetes treatment:  Farxiga 5 mg once a day, Levemir 15 units once a day and Humalog  5 units prior to each meal plus sliding scale taking 1 unit of insulin for every 25 points above a blood glucose level of 150.    Blood glucose device:  [employer] CGM  Blood glucose monitoring frequency:  Continuous per CGM  Blood glucose range/average:   he did bring his reader to the appointment today but reports glucose levels are between 130 - 280 mg/dL  Glucose Source: Patient Reported  Diet:  Limits high carb/sweet foods, Avoids sugary drinks  Activity/Exercise:  None    Past Medical History:   Diagnosis Date    1. Incision and drainage of posterior perianal abscess 2. Sharp excisional debridement through skin and subcutaneous tissue in the posterior perianal area, 9 x 6 x 1 cm 01/26/2025    Age-related cognitive decline     Allergic contact dermatitis     Allergies     Anemia     Bedbound     11/2023 \"MY LEG MUSCLES STOPPED WORKING\"    Bronchiectasis with acute lower respiratory infection     Charcot foot due to diabetes mellitus 09/10/2013    Chronic diastolic (congestive) heart failure     Chronic kidney disease     Chronic respiratory failure with hypoxia     Closed supracondylar fracture of femur 01/12/2022    COPD (chronic obstructive pulmonary disease)     Deep vein thrombosis (DVT) of lower extremity associated with air travel " "01/13/2023    Dependence on supplemental oxygen     Eczema     Erectile dysfunction     due to organic reasons    Essential (primary) hypertension     Fracture     closed fracture of other tarsal and metatarsal bones    Fracture of proximal humerus 01/13/2023    GERD without esophagitis     High risk medication use     Hypercholesteremia     Hypomagnesemia     Infected stasis ulcer of left lower extremity 01/13/2023    Insomnia     Low back pain     Major depressive disorder     Morbid (severe) obesity due to excess calories     MRSA pneumonia     Muscle weakness     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 without evidence of necrosis     Obstructive sleep apnea (adult) (pediatric)     On home O2     REPORTS WEARING 2L/NC AAT    Other forms of dyspnea     Other long term (current) drug therapy     Other specified noninfective gastroenteritis and colitis     Other spondylosis, lumbar region     Pain in both knees     Paroxysmal atrial fibrillation     Peripheral neuropathy     attributed to type 2 diabetes    Pneumonia, unspecified organism     Polyneuropathy     Rash and other nonspecific skin eruption     Self-catheterizes urinary bladder     EVERY 4 HOURS    Smoking     \"SOMETIMES\"    Syncope and collapse     Tachycardia     Tinnitus 01/13/2023    Type 1 diabetes mellitus with diabetic chronic kidney disease     Type 2 diabetes mellitus     Unspecified fall, initial encounter     Urinary retention      Past Surgical History:   Procedure Laterality Date    BRONCHOSCOPY N/A 1/28/2025    Procedure: BRONCHOSCOPY: BAL: insertion of lighted instrument to view inside the lung;  Surgeon: Walter Nicole DO;  Location: [facility] MAIN OR;  Service: Pulmonary;  Laterality: N/A;    BRONCHOSCOPY N/A 2/3/2025    Procedure: BRONCHOSCOPY WITH BRONCHOALVEOLAR LAVAGE, POSSIBLE BIOPSY, BRUSHING, WASHING, AIRWAY INSPECTION: insertion of lighted instrument to view inside the lung;  " Surgeon: Chadd Swan MD;  Location: [facility] MAIN [facility];  Service: Pulmonary;  Laterality: N/A;    CARDIAC CATHETERIZATION Left 8/15/2024    Procedure: Carbon dioxide aortogram with left leg angiogram, possible angioplasty or stenting;  Surgeon: Moshe Willson MD;  Location: [facility] CATH INVASIVE LOCATION;  Service: Vascular;  Laterality: Left;    CHOLECYSTECTOMY      COLOSTOMY N/A 2/6/2025    Procedure: COLOSTOMY LAPAROSCOPIC; plain text: creation of colostomy using small incisions;  Surgeon: Emerson Canada MD;  Location: [facility] OR OSC;  Service: General;  Laterality: N/A;    CYSTOSCOPY      ENDOSCOPY N/A 3/18/2025    Procedure: ESOPHAGOGASTRODUODENOSCOPY WITH BIOPSIES;  Surgeon: Rafael Hernandez MD;  Location: [facility] ENDOSCOPY;  Service: Gastroenterology;  Laterality: N/A;  HIATAL HERNIA, REFLUX ESOPHAGITIS    FEMUR SURGERY Left     Shravan placed    INCISION AND DRAINAGE ABSCESS N/A 1/26/2025    Procedure: INCISION AND DRAINAGE ABSCESS; plain text: incision and drain pus from buttocks wound;  Surgeon: Emerson Canada MD;  Location: [facility];  Service: General;  Laterality: N/A;    KNEE SURGERY Left     OTHER SURGICAL HISTORY Left     venous port, REMOVED    PORTACATH PLACEMENT Right     TIBIAL PLATEAU OPEN REDUCTION INTERNAL FIXATION Left 12/22/2023    Procedure: TIBIAL PLATEAU OPEN REDUCTION INTERNAL FIXATION;  Surgeon: Hugo Kline MD;  Location: [facility] OR;  Service: Orthopedics;  Laterality: Left;    TONSILLECTOMY AND ADENOIDECTOMY       Family History   Problem Relation Age of Onset    Coronary artery disease Mother     Hypertension Mother     Diabetes type II Mother     Asthma Father     Diabetes type II Sister     Cancer Sister     Malig Hyperthermia Neg Hx      Social History     Socioeconomic History    Marital status:    Tobacco Use    Smoking status: Former     Current packs/day: 0.00     Average packs/day: 1 pack/day for 12.0 years (12.0 ttl pk-yrs)     Types: Cigarettes      Start date:      Quit date:      Years since quittin.2     Passive exposure: Past    Smokeless tobacco: Never   Vaping Use    Vaping status: Never Used   Substance and Sexual Activity    Alcohol use: Not Currently    Drug use: Never    Sexual activity: Defer     Allergies   Allergen Reactions    Benadryl [Diphenhydramine] Itching    Proventil [Albuterol] Other (See Comments)     Mouth sores         Current Outpatient Medications:     arformoterol (BROVANA) 15 MCG/2ML nebulizer solution, Take 2 mL by nebulization 2 (Two) Times a Day., Disp: 360 mL, Rfl: 3    Aspirin Low Dose 81 MG EC tablet, TAKE 1 TABLET BY MOUTH EVERY MORNING, Disp: 30 tablet, Rfl: 3    atorvastatin (LIPITOR) 20 MG tablet, TAKE 1 TABLET BY MOUTH EVERY NIGHT AT BEDTIME, Disp: 30 tablet, Rfl: 3    budesonide (Pulmicort) 0.5 MG/2ML nebulizer solution, Take 2 mL by nebulization 2 (Two) Times a Day., Disp: 360 mL, Rfl: 3    busPIRone (BUSPAR) 15 MG tablet, TAKE 1 TABLET BY MOUTH TWICE DAILY, Disp: 60 tablet, Rfl: 3    calcium citrate (CALCITRATE) 950 (200 Ca) MG tablet, TAKE 1 TABLET BY MOUTH EVERY NIGHT AT BEDTIME, Disp: 30 tablet, Rfl: 3    Cholecalciferol (Vitamin D3) 50 MCG (2000 UT) tablet, TAKE 1 TABLET BY MOUTH EVERY MORNING, Disp: 30 tablet, Rfl: 3    docusate sodium (COLACE) 100 MG capsule, TAKE 1 CAPSULE BY MOUTH TWICE DAILY, Disp: 60 capsule, Rfl: 3    Eliquis 5 MG tablet tablet, TAKE 1 TABLET BY MOUTH EVERY TWELVE HOURS, Disp: 60 tablet, Rfl: 3    Farxiga 5 MG tablet tablet, TAKE 1 TABLET BY MOUTH EVERY MORNING, Disp: 30 tablet, Rfl: 3    ferrous gluconate (FERGON) 324 MG tablet, TAKE 1 TABLET BY MOUTH EVERY MORNING, Disp: 30 tablet, Rfl: 3    finasteride (PROSCAR) 5 MG tablet, TAKE 1 TABLET BY MOUTH EVERY NIGHT AT BEDTIME, Disp: 30 tablet, Rfl: 3    folic acid (FOLVITE) 1 MG tablet, TAKE 1 TABLET BY MOUTH EVERY NIGHT AT BEDTIME, Disp: 30 tablet, Rfl: 3    insulin detemir (Levemir) 100 UNIT/ML injection, Inject 5 Units under  the skin into the appropriate area as directed Every Night for 180 days., Disp: 15 mL, Rfl: 1    Insulin Lispro, 1 Unit Dial, (HUMALOG) 100 UNIT/ML solution pen-injector, Inject 5 Units under the skin into the appropriate area as directed 3 (Three) Times a Day Before Meals., Disp: 15 mL, Rfl: 0    ipratropium-albuterol (DUO-NEB) 0.5-2.5 mg/3 ml nebulizer, Take 3 mL by nebulization Every 4 (Four) Hours As Needed for Wheezing or Shortness of Air., Disp: 360 mL, Rfl: 5    Magnesium Oxide -Mg Supplement 400 (240 Mg) MG tablet, Take 1 tablet by mouth every night at bedtime., Disp: , Rfl:     montelukast (SINGULAIR) 10 MG tablet, Take 1 tablet by mouth Every Night., Disp: 30 tablet, Rfl: 5    Multiple Vitamins-Iron (GNP One Daily Plus Iron) tablet, TAKE 1 TABLET BY MOUTH EVERY MORNING, Disp: 30 each, Rfl: 3    tamsulosin (FLOMAX) 0.4 MG capsule 24 hr capsule, TAKE 1 CAPSULE BY MOUTH EVERY NIGHT AT BEDTIME, Disp: 30 capsule, Rfl: 3    tiotropium (SPIRIVA) 18 MCG per inhalation capsule, Place 1 capsule into inhaler and inhale Daily., Disp: 30 capsule, Rfl: 5    vitamin C (ASCORBIC ACID) 500 MG tablet, TAKE 1 TABLET BY MOUTH TWICE DAILY, Disp: 60 tablet, Rfl: 3    collagenase (Santyl) 250 UNIT/GM ointment, Apply 1 Application topically to the appropriate area as directed Daily., Disp: 30 g, Rfl: 1    dilTIAZem CD (CARDIZEM CD) 240 MG 24 hr capsule, Take 1 capsule by mouth Daily for 30 days., Disp: 30 capsule, Rfl: 0    pantoprazole (PROTONIX) 40 MG EC tablet, Take 1 tablet by mouth Every Morning., Disp: 30 tablet, Rfl: 1    Povidone-Iodine (Betadine) 5 % external solution 5%, Apply 1 mL topically to the appropriate area as directed 2 (Two) Times a Day. Left Heel, Disp: , Rfl:     Semaglutide, 1 MG/DOSE, (Ozempic, 1 MG/DOSE,) 4 MG/3ML solution pen-injector, Inject 1 mg under the skin into the appropriate area as directed 1 (One) Time Per Week. (Patient taking differently: Inject 1 mg under the skin into the appropriate area  "as directed 1 (One) Time Per Week. Saturday), Disp: 3 mL, Rfl: 5    Sodium Hypochlorite (Anasept) 0.057 % liquid, Apply 1 Application topically Daily. Sacrum, Disp: , Rfl:     Sodium Hypochlorite (Anasept) 0.057 % liquid, Apply 1 Application topically As Needed (This is in addition to the QD scheduled application). Sacrum, Disp: , Rfl:     sodium hypochlorite (DAKIN'S 1/4 STRENGTH) 0.125 % solution topical solution 0.125%, Apply 10 mL topically to the appropriate area as directed 2 (Two) Times a Day., Disp: 1000 mL, Rfl: 1    [START ON 3/27/2025] tobramycin PF (MOIZ) 300 MG/5ML nebulizer solution, Take 5 mL by nebulization 2 (Two) Times a Day for 28 days. nebulize 1 vial BY MOUTH TWICE DAILY FOR 28 DAYS ON AND 28 DAYS OFF, Disp: , Rfl:     zinc sulfate (Zinc-220) 220 (50 Zn) MG capsule, Take 1 capsule by mouth Daily., Disp: , Rfl:     Objective     Vitals:    03/14/25 1100   BP: 130/50   BP Location: Right arm   Patient Position: Sitting   Cuff Size: Large Adult   Pulse: 93   SpO2: 98%   Weight: 103 kg (228 lb)   Height: 175.3 cm (69\")     Body mass index is 33.67 kg/m².    Physical Exam  Constitutional:       Appearance: Normal appearance. He is obese.   HENT:      Head: Normocephalic and atraumatic.      Right Ear: External ear normal.      Left Ear: External ear normal.      Nose: Nose normal.   Eyes:      Extraocular Movements: Extraocular movements intact.      Conjunctiva/sclera: Conjunctivae normal.   Pulmonary:      Effort: Pulmonary effort is normal.   Musculoskeletal:         General: Normal range of motion.      Cervical back: Normal range of motion.   Skin:     General: Skin is warm and dry.   Neurological:      General: No focal deficit present.      Mental Status: He is alert and oriented to person, place, and time. Mental status is at baseline.   Psychiatric:         Mood and Affect: Mood normal.         Behavior: Behavior normal.         Thought Content: Thought content normal.         Judgment: " Judgment normal.             Result Review :   The following data was reviewed by: CON Ashley on 03/14/2025:    Most Recent A1C          3/14/2025    11:16   HGBA1C Most Recent   Hemoglobin A1C 7.0        A1C Last 3 Results          10/22/2024    12:49 12/11/2024    10:56 3/14/2025    11:16   HGBA1C Last 3 Results   Hemoglobin A1C 7.5  8.8  7.0      A1c collected in the office today is 7%, indicating Controlled Type II diabetes.  This result is down from the prior result of 8.8% collected on 12/11/24       Creatinine   Date Value Ref Range Status   03/19/2025 1.87 (H) 0.76 - 1.27 mg/dL Final   03/18/2025 2.01 (H) 0.76 - 1.27 mg/dL Final     eGFR   Date Value Ref Range Status   03/19/2025 40.9 (L) >60.0 mL/min/1.73 Final   03/18/2025 37.5 (L) >60.0 mL/min/1.73 Final     Labs collected on 2/20/25 show Stage IV severe (GFR = 15-29 mL/min      Total Cholesterol   Date Value Ref Range Status   01/25/2025 116 0 - 200 mg/dL Final   07/18/2024 109 0 - 200 mg/dL Final     Triglycerides   Date Value Ref Range Status   01/25/2025 49 0 - 150 mg/dL Final   07/18/2024 117 0 - 150 mg/dL Final     HDL Cholesterol   Date Value Ref Range Status   01/25/2025 54 40 - 60 mg/dL Final   07/18/2024 36 (L) 40 - 60 mg/dL Final     LDL Cholesterol    Date Value Ref Range Status   01/25/2025 50 0 - 100 mg/dL Final   07/18/2024 52 0 - 100 mg/dL Final     Lipid panel collected on 1/25/25 shows normal lipid panel              Diagnoses and all orders for this visit:    1. Controlled type 2 diabetes mellitus with diabetic polyneuropathy, with long-term current use of insulin (Primary)  -     POC Glycosylated Hemoglobin (Hb A1C)  -     Continuous Glucose Sensor (FreeStyle Bethany 3 Plus Sensor); Use 2 each Every 30 (Thirty) Days. Apply as directed every 15 days  Dispense: 2 each; Refill: 5    2. Type 2 diabetes mellitus with both eyes affected by mild nonproliferative retinopathy without macular edema, with long-term current use of  insulin    3. Type 2 diabetes mellitus with stage 4 chronic kidney disease, with long-term current use of insulin    4. Diabetic ulcer of left heel associated with type 2 diabetes mellitus, unspecified ulcer stage          Assessment & Plan  1. Diabetes mellitus.  His A1c level has shown significant improvement, decreasing from 8.8 in December 2024 to 7 currently. This suggests effective dietary management at the skilled facility. He is currently on Farxiga 5 mg once a day and Lispro insulin 5 units three times a day with meals. He has not been on Ozempic 1 mg once weekly for a while. His blood sugar levels have been fluctuating, with occasional lows below 70 and highs up to 280s. A consultation with the skilled facility will be conducted to ensure they provide the necessary documents and to confirm their treatment plan. If they are not administering Ozempic, the introduction of Levemir or another long-acting insulin may be considered to maintain optimal blood sugar control. A prescription for Bethany 3+ sensors will be provided.          The patient will monitor his blood glucose levels per continuous glucose monitor.  If he develops problematic hyperglycemia or hypoglycemia or adverse drug reactions, he will contact the office for further instructions.        Follow Up     Return in about 3 months (around 6/14/2025) for Medication Management, CGM Follow-up.    Patient was given instructions and counseling regarding his condition or for health maintenance advice. Please see specific information pulled into the AVS if appropriate.     Neli Paredes, APRN  03/14/2025        Dictated Utilizing Dragon Dictation.  Please note that portions of this note were completed with a voice recognition program.  Part of this note may be an electronic transcription/translation of spoken language to printed text using the Dragon Dictation System.

## 2025-03-16 ENCOUNTER — APPOINTMENT (OUTPATIENT)
Dept: CT IMAGING | Facility: HOSPITAL | Age: 60
End: 2025-03-16
Payer: COMMERCIAL

## 2025-03-16 ENCOUNTER — APPOINTMENT (OUTPATIENT)
Dept: GENERAL RADIOLOGY | Facility: HOSPITAL | Age: 60
End: 2025-03-16
Payer: COMMERCIAL

## 2025-03-16 ENCOUNTER — HOSPITAL ENCOUNTER (INPATIENT)
Facility: HOSPITAL | Age: 60
LOS: 3 days | Discharge: HOME HEALTH | End: 2025-03-19
Attending: EMERGENCY MEDICINE | Admitting: INTERNAL MEDICINE
Payer: COMMERCIAL

## 2025-03-16 DIAGNOSIS — K92.2 GASTROINTESTINAL HEMORRHAGE, UNSPECIFIED GASTROINTESTINAL HEMORRHAGE TYPE: Primary | ICD-10-CM

## 2025-03-16 DIAGNOSIS — S31.809S WOUND OF GLUTEAL CLEFT, UNSPECIFIED LATERALITY, SEQUELA: ICD-10-CM

## 2025-03-16 DIAGNOSIS — Z78.9 DECREASED ACTIVITIES OF DAILY LIVING (ADL): ICD-10-CM

## 2025-03-16 DIAGNOSIS — R26.2 DIFFICULTY WALKING: ICD-10-CM

## 2025-03-16 DIAGNOSIS — Z93.3 COLOSTOMY STATUS: ICD-10-CM

## 2025-03-16 DIAGNOSIS — R26.2 INABILITY TO WALK: ICD-10-CM

## 2025-03-16 LAB
ABO GROUP BLD: NORMAL
ALBUMIN SERPL-MCNC: 2.9 G/DL (ref 3.5–5.2)
ALBUMIN/GLOB SERPL: 0.7 G/DL
ALP SERPL-CCNC: 196 U/L (ref 39–117)
ALT SERPL W P-5'-P-CCNC: 30 U/L (ref 1–41)
ANION GAP SERPL CALCULATED.3IONS-SCNC: 10.1 MMOL/L (ref 5–15)
APTT PPP: 37.2 SECONDS (ref 24.2–34.2)
AST SERPL-CCNC: 34 U/L (ref 1–40)
BACTERIA UR QL AUTO: ABNORMAL /HPF
BASOPHILS # BLD AUTO: 0.07 10*3/MM3 (ref 0–0.2)
BASOPHILS NFR BLD AUTO: 0.6 % (ref 0–1.5)
BILIRUB SERPL-MCNC: 0.2 MG/DL (ref 0–1.2)
BILIRUB UR QL STRIP: NEGATIVE
BLD GP AB SCN SERPL QL: NEGATIVE
BUN SERPL-MCNC: 20 MG/DL (ref 6–20)
BUN/CREAT SERPL: 9.6 (ref 7–25)
CALCIUM SPEC-SCNC: 9.2 MG/DL (ref 8.6–10.5)
CHLORIDE SERPL-SCNC: 101 MMOL/L (ref 98–107)
CLARITY UR: CLEAR
CO2 SERPL-SCNC: 28.9 MMOL/L (ref 22–29)
COLOR UR: YELLOW
CREAT SERPL-MCNC: 2.09 MG/DL (ref 0.76–1.27)
D-LACTATE SERPL-SCNC: 1.2 MMOL/L (ref 0.5–2)
DEPRECATED RDW RBC AUTO: 49.2 FL (ref 37–54)
EGFRCR SERPLBLD CKD-EPI 2021: 35.8 ML/MIN/1.73
EOSINOPHIL # BLD AUTO: 0.56 10*3/MM3 (ref 0–0.4)
EOSINOPHIL NFR BLD AUTO: 4.8 % (ref 0.3–6.2)
ERYTHROCYTE [DISTWIDTH] IN BLOOD BY AUTOMATED COUNT: 15.2 % (ref 12.3–15.4)
GEN 5 1HR TROPONIN T REFLEX: 125 NG/L
GLOBULIN UR ELPH-MCNC: 4.4 GM/DL
GLUCOSE BLDC GLUCOMTR-MCNC: 96 MG/DL (ref 70–99)
GLUCOSE SERPL-MCNC: 124 MG/DL (ref 65–99)
GLUCOSE UR STRIP-MCNC: ABNORMAL MG/DL
HCT VFR BLD AUTO: 29.9 % (ref 37.5–51)
HGB BLD-MCNC: 9.2 G/DL (ref 13–17.7)
HGB UR QL STRIP.AUTO: ABNORMAL
HOLD SPECIMEN: NORMAL
HOLD SPECIMEN: NORMAL
HYALINE CASTS UR QL AUTO: ABNORMAL /LPF
IMM GRANULOCYTES # BLD AUTO: 0.04 10*3/MM3 (ref 0–0.05)
IMM GRANULOCYTES NFR BLD AUTO: 0.3 % (ref 0–0.5)
INR PPP: 1.27 (ref 0.86–1.15)
KETONES UR QL STRIP: ABNORMAL
LEUKOCYTE ESTERASE UR QL STRIP.AUTO: ABNORMAL
LIPASE SERPL-CCNC: 7 U/L (ref 13–60)
LYMPHOCYTES # BLD AUTO: 1.66 10*3/MM3 (ref 0.7–3.1)
LYMPHOCYTES NFR BLD AUTO: 14.2 % (ref 19.6–45.3)
MCH RBC QN AUTO: 27.5 PG (ref 26.6–33)
MCHC RBC AUTO-ENTMCNC: 30.8 G/DL (ref 31.5–35.7)
MCV RBC AUTO: 89.3 FL (ref 79–97)
MONOCYTES # BLD AUTO: 0.89 10*3/MM3 (ref 0.1–0.9)
MONOCYTES NFR BLD AUTO: 7.6 % (ref 5–12)
NEUTROPHILS NFR BLD AUTO: 72.5 % (ref 42.7–76)
NEUTROPHILS NFR BLD AUTO: 8.5 10*3/MM3 (ref 1.7–7)
NITRITE UR QL STRIP: NEGATIVE
NRBC BLD AUTO-RTO: 0 /100 WBC (ref 0–0.2)
NT-PROBNP SERPL-MCNC: 1518 PG/ML (ref 0–900)
PH UR STRIP.AUTO: 7 [PH] (ref 5–8)
PLATELET # BLD AUTO: 516 10*3/MM3 (ref 140–450)
PMV BLD AUTO: 8.7 FL (ref 6–12)
POTASSIUM SERPL-SCNC: 3.8 MMOL/L (ref 3.5–5.2)
PROT SERPL-MCNC: 7.3 G/DL (ref 6–8.5)
PROT UR QL STRIP: ABNORMAL
PROTHROMBIN TIME: 16.5 SECONDS (ref 11.8–14.9)
RBC # BLD AUTO: 3.35 10*6/MM3 (ref 4.14–5.8)
RBC # UR STRIP: ABNORMAL /HPF
REF LAB TEST METHOD: ABNORMAL
RH BLD: NEGATIVE
SODIUM SERPL-SCNC: 140 MMOL/L (ref 136–145)
SP GR UR STRIP: 1.01 (ref 1–1.03)
SQUAMOUS #/AREA URNS HPF: ABNORMAL /HPF
T&S EXPIRATION DATE: NORMAL
TROPONIN T % DELTA: -5
TROPONIN T NUMERIC DELTA: -7 NG/L
TROPONIN T SERPL HS-MCNC: 132 NG/L
UROBILINOGEN UR QL STRIP: ABNORMAL
WBC # UR STRIP: ABNORMAL /HPF
WBC NRBC COR # BLD AUTO: 11.72 10*3/MM3 (ref 3.4–10.8)
WHOLE BLOOD HOLD COAG: NORMAL
WHOLE BLOOD HOLD SPECIMEN: NORMAL
YEAST URNS QL MICRO: ABNORMAL /HPF

## 2025-03-16 PROCEDURE — 96375 TX/PRO/DX INJ NEW DRUG ADDON: CPT

## 2025-03-16 PROCEDURE — 86900 BLOOD TYPING SEROLOGIC ABO: CPT | Performed by: INTERNAL MEDICINE

## 2025-03-16 PROCEDURE — 25810000003 SODIUM CHLORIDE 0.9 % SOLUTION: Performed by: EMERGENCY MEDICINE

## 2025-03-16 PROCEDURE — 84484 ASSAY OF TROPONIN QUANT: CPT | Performed by: EMERGENCY MEDICINE

## 2025-03-16 PROCEDURE — 96374 THER/PROPH/DIAG INJ IV PUSH: CPT

## 2025-03-16 PROCEDURE — 94799 UNLISTED PULMONARY SVC/PX: CPT

## 2025-03-16 PROCEDURE — 80053 COMPREHEN METABOLIC PANEL: CPT | Performed by: EMERGENCY MEDICINE

## 2025-03-16 PROCEDURE — 81001 URINALYSIS AUTO W/SCOPE: CPT | Performed by: EMERGENCY MEDICINE

## 2025-03-16 PROCEDURE — 99222 1ST HOSP IP/OBS MODERATE 55: CPT | Performed by: INTERNAL MEDICINE

## 2025-03-16 PROCEDURE — 86901 BLOOD TYPING SEROLOGIC RH(D): CPT | Performed by: INTERNAL MEDICINE

## 2025-03-16 PROCEDURE — 93005 ELECTROCARDIOGRAM TRACING: CPT | Performed by: EMERGENCY MEDICINE

## 2025-03-16 PROCEDURE — 99291 CRITICAL CARE FIRST HOUR: CPT

## 2025-03-16 PROCEDURE — 93010 ELECTROCARDIOGRAM REPORT: CPT | Performed by: INTERNAL MEDICINE

## 2025-03-16 PROCEDURE — 82948 REAGENT STRIP/BLOOD GLUCOSE: CPT

## 2025-03-16 PROCEDURE — 36415 COLL VENOUS BLD VENIPUNCTURE: CPT

## 2025-03-16 PROCEDURE — 74176 CT ABD & PELVIS W/O CONTRAST: CPT

## 2025-03-16 PROCEDURE — 85025 COMPLETE CBC W/AUTO DIFF WBC: CPT | Performed by: EMERGENCY MEDICINE

## 2025-03-16 PROCEDURE — 83880 ASSAY OF NATRIURETIC PEPTIDE: CPT | Performed by: EMERGENCY MEDICINE

## 2025-03-16 PROCEDURE — 85730 THROMBOPLASTIN TIME PARTIAL: CPT | Performed by: INTERNAL MEDICINE

## 2025-03-16 PROCEDURE — 99285 EMERGENCY DEPT VISIT HI MDM: CPT

## 2025-03-16 PROCEDURE — 85610 PROTHROMBIN TIME: CPT | Performed by: INTERNAL MEDICINE

## 2025-03-16 PROCEDURE — 25010000002 ONDANSETRON PER 1 MG: Performed by: EMERGENCY MEDICINE

## 2025-03-16 PROCEDURE — 83605 ASSAY OF LACTIC ACID: CPT | Performed by: EMERGENCY MEDICINE

## 2025-03-16 PROCEDURE — 94640 AIRWAY INHALATION TREATMENT: CPT

## 2025-03-16 PROCEDURE — 83690 ASSAY OF LIPASE: CPT | Performed by: EMERGENCY MEDICINE

## 2025-03-16 PROCEDURE — 71045 X-RAY EXAM CHEST 1 VIEW: CPT

## 2025-03-16 PROCEDURE — 86850 RBC ANTIBODY SCREEN: CPT | Performed by: INTERNAL MEDICINE

## 2025-03-16 RX ORDER — ACETAMINOPHEN 160 MG/5ML
650 SOLUTION ORAL EVERY 4 HOURS PRN
Status: DISCONTINUED | OUTPATIENT
Start: 2025-03-16 | End: 2025-03-20 | Stop reason: HOSPADM

## 2025-03-16 RX ORDER — DEXTROSE MONOHYDRATE 25 G/50ML
25 INJECTION, SOLUTION INTRAVENOUS
Status: DISCONTINUED | OUTPATIENT
Start: 2025-03-16 | End: 2025-03-20 | Stop reason: HOSPADM

## 2025-03-16 RX ORDER — ONDANSETRON 2 MG/ML
4 INJECTION INTRAMUSCULAR; INTRAVENOUS ONCE
Status: COMPLETED | OUTPATIENT
Start: 2025-03-16 | End: 2025-03-16

## 2025-03-16 RX ORDER — SODIUM CHLORIDE 0.9 % (FLUSH) 0.9 %
10 SYRINGE (ML) INJECTION AS NEEDED
Status: DISCONTINUED | OUTPATIENT
Start: 2025-03-16 | End: 2025-03-20 | Stop reason: HOSPADM

## 2025-03-16 RX ORDER — INSULIN LISPRO 100 [IU]/ML
2-9 INJECTION, SOLUTION INTRAVENOUS; SUBCUTANEOUS
Status: DISCONTINUED | OUTPATIENT
Start: 2025-03-16 | End: 2025-03-20 | Stop reason: HOSPADM

## 2025-03-16 RX ORDER — BISACODYL 5 MG/1
5 TABLET, DELAYED RELEASE ORAL DAILY PRN
Status: DISCONTINUED | OUTPATIENT
Start: 2025-03-16 | End: 2025-03-20 | Stop reason: HOSPADM

## 2025-03-16 RX ORDER — NICOTINE POLACRILEX 4 MG
15 LOZENGE BUCCAL
Status: DISCONTINUED | OUTPATIENT
Start: 2025-03-16 | End: 2025-03-20 | Stop reason: HOSPADM

## 2025-03-16 RX ORDER — IPRATROPIUM BROMIDE AND ALBUTEROL SULFATE 2.5; .5 MG/3ML; MG/3ML
3 SOLUTION RESPIRATORY (INHALATION) EVERY 4 HOURS PRN
Status: DISCONTINUED | OUTPATIENT
Start: 2025-03-16 | End: 2025-03-20 | Stop reason: HOSPADM

## 2025-03-16 RX ORDER — BISACODYL 10 MG
10 SUPPOSITORY, RECTAL RECTAL DAILY PRN
Status: DISCONTINUED | OUTPATIENT
Start: 2025-03-16 | End: 2025-03-20 | Stop reason: HOSPADM

## 2025-03-16 RX ORDER — ARFORMOTEROL TARTRATE 15 UG/2ML
15 SOLUTION RESPIRATORY (INHALATION)
Status: DISCONTINUED | OUTPATIENT
Start: 2025-03-16 | End: 2025-03-20 | Stop reason: HOSPADM

## 2025-03-16 RX ORDER — PANTOPRAZOLE SODIUM 40 MG/10ML
40 INJECTION, POWDER, LYOPHILIZED, FOR SOLUTION INTRAVENOUS EVERY 12 HOURS SCHEDULED
Status: DISCONTINUED | OUTPATIENT
Start: 2025-03-17 | End: 2025-03-19

## 2025-03-16 RX ORDER — ACETAMINOPHEN 325 MG/1
650 TABLET ORAL EVERY 4 HOURS PRN
Status: DISCONTINUED | OUTPATIENT
Start: 2025-03-16 | End: 2025-03-20 | Stop reason: HOSPADM

## 2025-03-16 RX ORDER — SODIUM CHLORIDE 9 MG/ML
40 INJECTION, SOLUTION INTRAVENOUS AS NEEDED
Status: DISCONTINUED | OUTPATIENT
Start: 2025-03-16 | End: 2025-03-20 | Stop reason: HOSPADM

## 2025-03-16 RX ORDER — IBUPROFEN 600 MG/1
1 TABLET ORAL
Status: DISCONTINUED | OUTPATIENT
Start: 2025-03-16 | End: 2025-03-20 | Stop reason: HOSPADM

## 2025-03-16 RX ORDER — SODIUM CHLORIDE 0.9 % (FLUSH) 0.9 %
10 SYRINGE (ML) INJECTION EVERY 12 HOURS SCHEDULED
Status: DISCONTINUED | OUTPATIENT
Start: 2025-03-16 | End: 2025-03-20 | Stop reason: HOSPADM

## 2025-03-16 RX ORDER — PANTOPRAZOLE SODIUM 40 MG/10ML
40 INJECTION, POWDER, LYOPHILIZED, FOR SOLUTION INTRAVENOUS ONCE
Status: COMPLETED | OUTPATIENT
Start: 2025-03-16 | End: 2025-03-16

## 2025-03-16 RX ORDER — BUDESONIDE 0.5 MG/2ML
0.5 INHALANT ORAL 2 TIMES DAILY
Status: DISCONTINUED | OUTPATIENT
Start: 2025-03-16 | End: 2025-03-20 | Stop reason: HOSPADM

## 2025-03-16 RX ORDER — POLYETHYLENE GLYCOL 3350 17 G/17G
17 POWDER, FOR SOLUTION ORAL DAILY PRN
Status: DISCONTINUED | OUTPATIENT
Start: 2025-03-16 | End: 2025-03-20 | Stop reason: HOSPADM

## 2025-03-16 RX ORDER — ONDANSETRON 2 MG/ML
4 INJECTION INTRAMUSCULAR; INTRAVENOUS EVERY 6 HOURS PRN
Status: DISCONTINUED | OUTPATIENT
Start: 2025-03-16 | End: 2025-03-20 | Stop reason: HOSPADM

## 2025-03-16 RX ORDER — AMOXICILLIN 250 MG
2 CAPSULE ORAL 2 TIMES DAILY PRN
Status: DISCONTINUED | OUTPATIENT
Start: 2025-03-16 | End: 2025-03-20 | Stop reason: HOSPADM

## 2025-03-16 RX ORDER — NITROGLYCERIN 0.4 MG/1
0.4 TABLET SUBLINGUAL
Status: DISCONTINUED | OUTPATIENT
Start: 2025-03-16 | End: 2025-03-17

## 2025-03-16 RX ORDER — ACETAMINOPHEN 650 MG/1
650 SUPPOSITORY RECTAL EVERY 4 HOURS PRN
Status: DISCONTINUED | OUTPATIENT
Start: 2025-03-16 | End: 2025-03-20 | Stop reason: HOSPADM

## 2025-03-16 RX ADMIN — SODIUM CHLORIDE 1000 ML: 9 INJECTION, SOLUTION INTRAVENOUS at 15:56

## 2025-03-16 RX ADMIN — ARFORMOTEROL TARTRATE 15 MCG: 15 SOLUTION RESPIRATORY (INHALATION) at 20:00

## 2025-03-16 RX ADMIN — BUDESONIDE 0.5 MG: 0.5 INHALANT RESPIRATORY (INHALATION) at 20:00

## 2025-03-16 RX ADMIN — IPRATROPIUM BROMIDE AND ALBUTEROL SULFATE 3 ML: .5; 3 SOLUTION RESPIRATORY (INHALATION) at 20:03

## 2025-03-16 RX ADMIN — PANTOPRAZOLE SODIUM 40 MG: 40 INJECTION, POWDER, FOR SOLUTION INTRAVENOUS at 17:11

## 2025-03-16 RX ADMIN — ONDANSETRON 4 MG: 2 INJECTION INTRAMUSCULAR; INTRAVENOUS at 15:56

## 2025-03-16 NOTE — ED PROVIDER NOTES
"Time: 5:26 PM EDT  Date of encounter:  3/16/2025  Independent Historian/Clinical History and Information was obtained by:   Patient    History is limited by: N/A    Chief Complaint: Hematemesis      History of Present Illness:  Patient is a 59 y.o. year old male who presents to the emergency department for evaluation of hematemesis at the nursing home.  Patient denies diarrhea.  Patient does report abdominal pain.  Patient has no chest pain or shortness of breath.  Patient has no cough hemoptysis.      Patient Care Team  Primary Care Provider: Kimmy Riley MD    Past Medical History:     Allergies   Allergen Reactions    Benadryl [Diphenhydramine] Itching    Proventil [Albuterol] Other (See Comments)     Mouth sores       Past Medical History:   Diagnosis Date    1. Incision and drainage of posterior perianal abscess 2. Sharp excisional debridement through skin and subcutaneous tissue in the posterior perianal area, 9 x 6 x 1 cm 01/26/2025    Age-related cognitive decline     Allergic contact dermatitis     Allergies     Anemia     Bedbound     11/2023 \"MY LEG MUSCLES STOPPED WORKING\"    Bronchiectasis with acute lower respiratory infection     Charcot foot due to diabetes mellitus 09/10/2013    Chronic diastolic (congestive) heart failure     Chronic kidney disease     Chronic respiratory failure with hypoxia     Closed supracondylar fracture of femur 01/12/2022    COPD (chronic obstructive pulmonary disease)     Deep vein thrombosis (DVT) of lower extremity associated with air travel 01/13/2023    Dependence on supplemental oxygen     Eczema     Erectile dysfunction     due to organic reasons    Essential (primary) hypertension     Fracture     closed fracture of other tarsal and metatarsal bones    Fracture of proximal humerus 01/13/2023    GERD without esophagitis     High risk medication use     Hypercholesteremia     Hypomagnesemia     Infected stasis ulcer of left lower extremity 01/13/2023    " "Insomnia     Low back pain     Major depressive disorder     Morbid (severe) obesity due to excess calories     MRSA pneumonia     Muscle weakness     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 without evidence of necrosis     Obstructive sleep apnea (adult) (pediatric)     On home O2     REPORTS WEARING 2L/NC AAT    Other forms of dyspnea     Other long term (current) drug therapy     Other specified noninfective gastroenteritis and colitis     Other spondylosis, lumbar region     Pain in both knees     Paroxysmal atrial fibrillation     Peripheral neuropathy     attributed to type 2 diabetes    Pneumonia, unspecified organism     Polyneuropathy     Rash and other nonspecific skin eruption     Self-catheterizes urinary bladder     EVERY 4 HOURS    Smoking     \"SOMETIMES\"    Syncope and collapse     Tachycardia     Tinnitus 01/13/2023    Type 1 diabetes mellitus with diabetic chronic kidney disease     Type 2 diabetes mellitus     Unspecified fall, initial encounter     Urinary retention      Past Surgical History:   Procedure Laterality Date    BRONCHOSCOPY N/A 1/28/2025    Procedure: BRONCHOSCOPY: BAL: insertion of lighted instrument to view inside the lung;  Surgeon: Walter Nicole DO;  Location: [facility] MAIN OR;  Service: Pulmonary;  Laterality: N/A;    BRONCHOSCOPY N/A 2/3/2025    Procedure: BRONCHOSCOPY WITH BRONCHOALVEOLAR LAVAGE, POSSIBLE BIOPSY, BRUSHING, WASHING, AIRWAY INSPECTION: insertion of lighted instrument to view inside the lung;  Surgeon: Chadd Swan MD;  Location: [facility] MAIN OR;  Service: Pulmonary;  Laterality: N/A;    CARDIAC CATHETERIZATION Left 8/15/2024    Procedure: Carbon dioxide aortogram with left leg angiogram, possible angioplasty or stenting;  Surgeon: Moshe Willson MD;  Location: [facility] CATH INVASIVE LOCATION;  Service: Vascular;  Laterality: Left;    CHOLECYSTECTOMY      COLOSTOMY N/A 2/6/2025    Procedure: COLOSTOMY " LAPAROSCOPIC; plain text: creation of colostomy using small incisions;  Surgeon: Emerson Canada MD;  [place] [facility] [place] [facility] [place]  Service: General;  Laterality: N/A;    CYSTOSCOPY      FEMUR SURGERY Left     Shravan placed    INCISION AND DRAINAGE ABSCESS N/A 1/26/2025    Procedure: INCISION AND DRAINAGE ABSCESS; plain text: incision and drain pus from buttocks wound;  Surgeon: Emerson Canada MD;  Location: [facility] OR [facility];  Service: General;  Laterality: N/A;    KNEE SURGERY Left     OTHER SURGICAL HISTORY Left     venous port, REMOVED    PORTACATH PLACEMENT Right     TIBIAL PLATEAU OPEN REDUCTION INTERNAL FIXATION Left 12/22/2023    Procedure: TIBIAL PLATEAU OPEN REDUCTION INTERNAL FIXATION;  Surgeon: Hugo Kline MD;  Location: [facility] MAIN OR;  Service: Orthopedics;  Laterality: Left;    TONSILLECTOMY AND ADENOIDECTOMY       Family History   Problem Relation Age of Onset    Coronary artery disease Mother     Hypertension Mother     Diabetes type II Mother     Asthma Father     Diabetes type II Sister     Cancer Sister     Malig Hyperthermia Neg Hx        Home Medications:  Prior to Admission medications    Medication Sig Start Date End Date Taking? Authorizing Provider   amoxicillin-clavulanate (AUGMENTIN) 875-125 MG per tablet  3/8/25   Provider, MD Breann   arformoterol (BROVANA) 15 MCG/2ML nebulizer solution Take 2 mL by nebulization 2 (Two) Times a Day. 8/22/24   Betzaida Nelson APRN   Aspirin Low Dose 81 MG EC tablet TAKE 1 TABLET BY MOUTH EVERY MORNING 11/25/24   Kimmy Riley MD   atorvastatin (LIPITOR) 20 MG tablet TAKE 1 TABLET BY MOUTH EVERY NIGHT AT BEDTIME 11/25/24   Kimmy Riley MD   budesonide (Pulmicort) 0.5 MG/2ML nebulizer solution Take 2 mL by nebulization 2 (Two) Times a Day. 8/22/24   Betzaida Nelson APRN   busPIRone (BUSPAR) 15 MG tablet TAKE 1 TABLET BY MOUTH TWICE DAILY 11/25/24   Kimmy Riley MD   calcium citrate (CALCITRATE) 950 (200 Ca) MG tablet  TAKE 1 TABLET BY MOUTH EVERY NIGHT AT BEDTIME 11/25/24   Kimmy Riley MD   Cholecalciferol (Vitamin D3) 50 MCG (2000 UT) tablet TAKE 1 TABLET BY MOUTH EVERY MORNING 11/25/24   Kimmy Riley MD   collagenase 250 UNIT/GM ointment Apply 1 Application topically to the appropriate area as directed As Needed (To be applied to figueroa-rectal wound with wound VAC dressing changes three times a week as needed for slough.). 2/20/25   Kevon Vargas MD   collagenase 250 UNIT/GM ointment Apply 1 Application topically to the appropriate area as directed Daily. 2/20/25   Kevon Vargas MD   Continuous Glucose  (FreeStyle [place] 3 [place]) device See Admin Instructions. 12/17/24   Breann Shukla MD   Continuous Glucose Sensor (FreeStyle [place] 3 Plus Sensor) Use 2 each Every 30 (Thirty) Days. Apply as directed every 15 days 3/14/25   Neli Paredes APRN   dilTIAZem CD (CARDIZEM CD) 240 MG 24 hr capsule Take 1 capsule by mouth Daily. 12/2/24   Breann Shukla MD   docusate sodium (COLACE) 100 MG capsule TAKE 1 CAPSULE BY MOUTH TWICE DAILY 11/25/24   Kimmy Riley MD   Eliquis 5 MG tablet tablet TAKE 1 TABLET BY MOUTH EVERY TWELVE HOURS 11/25/24   Kimmy Riley MD   famotidine (PEPCID) 40 MG tablet TAKE 1 TABLET BY MOUTH EVERY MORNING 11/25/24   Kimmy Riley MD   Farxiga 5 MG tablet tablet TAKE 1 TABLET BY MOUTH EVERY MORNING 11/25/24   Kimmy Riley MD   ferrous gluconate (FERGON) 324 MG tablet TAKE 1 TABLET BY MOUTH EVERY MORNING 11/25/24   Kimmy Riley MD   ferrous sulfate 325 (65 FE) MG tablet Take 1 tablet by mouth. 12/2/24   Breann Shukla MD   finasteride (PROSCAR) 5 MG tablet TAKE 1 TABLET BY MOUTH EVERY NIGHT AT BEDTIME 11/25/24   Kimmy Riley MD   folic acid (FOLVITE) 1 MG tablet TAKE 1 TABLET BY MOUTH EVERY NIGHT AT BEDTIME 11/25/24   Kimmy Riley MD   gabapentin (NEURONTIN) 300 MG capsule Take 1 capsule by mouth. 12/2/24    Breann Shukla MD   hydrALAZINE (APRESOLINE) 50 MG tablet Take 1 tablet by mouth. 12/2/24   Breann Shukla MD   insulin detemir (Levemir) 100 UNIT/ML injection Inject 5 Units under the skin into the appropriate area as directed Every Night for 180 days. 2/20/25 8/19/25  Kevon Vargas MD   Insulin Lispro, 1 Unit Dial, (HUMALOG) 100 UNIT/ML solution pen-injector Inject 5 Units under the skin into the appropriate area as directed 3 (Three) Times a Day Before Meals. 2/20/25   Kevon Vargas MD   ipratropium-albuterol (DUO-NEB) 0.5-2.5 mg/3 ml nebulizer Take 3 mL by nebulization Every 4 (Four) Hours As Needed for Wheezing or Shortness of Air. 8/22/24   Betzaida Nelson APRN   Levemir FlexPen 100 UNIT/ML injection  2/26/25   Breann Shukla MD   lisinopril (PRINIVIL,ZESTRIL) 10 MG tablet Take 1 tablet by mouth Daily.    Breann Shukla MD   Magnesium Oxide -Mg Supplement 400 (240 Mg) MG tablet Take 1 tablet by mouth every night at bedtime. 1/2/25   Breann Shukla MD   metFORMIN ER (GLUCOPHAGE-XR) 500 MG 24 hr tablet Take 1 tablet by mouth. 12/2/24   Breann Shukla MD   metoprolol tartrate (LOPRESSOR) 100 MG tablet Take 1 tablet by mouth. 12/2/24   Breann Shukla MD   montelukast (SINGULAIR) 10 MG tablet Take 1 tablet by mouth Every Night. 8/22/24   Betzaida Nelson APRN   Multiple Vitamins-Iron (GNP One Daily Plus Iron) tablet TAKE 1 TABLET BY MOUTH EVERY MORNING 11/25/24   Kimmy Riley MD   O2 (OXYGEN) 2 Liter O2 - CONTINUOUS (route: Oxygen) 2/1/22   Breann Shukla MD   Semaglutide, 1 MG/DOSE, (Ozempic, 1 MG/DOSE,) 4 MG/3ML solution pen-injector Inject 1 mg under the skin into the appropriate area as directed 1 (One) Time Per Week.  Patient not taking: Reported on 3/14/2025 12/11/24   Neli Paredes APRN   tamsulosin (FLOMAX) 0.4 MG capsule 24 hr capsule TAKE 1 CAPSULE BY MOUTH EVERY NIGHT AT BEDTIME 11/25/24   Kimmy Riley MD   tiotropium  "(SPIRIVA) 18 MCG per inhalation capsule Place 1 capsule into inhaler and inhale Daily. 10/31/24   Martín Rivera MD   tobramycin PF (MOIZ) 300 MG/5ML nebulizer solution Take 5 mL by nebulization 2 (Two) Times a Day. nebulize 1 vial BY MOUTH TWICE DAILY FOR 28 DAYS ON AND 28 DAYS OFF    Provider, MD Breann   vitamin C (ASCORBIC ACID) 500 MG tablet TAKE 1 TABLET BY MOUTH TWICE DAILY 24   Kimmy Riley MD        Social History:   Social History     Tobacco Use    Smoking status: Former     Current packs/day: 0.00     Average packs/day: 1 pack/day for 12.0 years (12.0 ttl pk-yrs)     Types: Cigarettes     Start date:      Quit date:      Years since quittin.2     Passive exposure: Past    Smokeless tobacco: Never   Vaping Use    Vaping status: Never Used   Substance Use Topics    Alcohol use: Not Currently    Drug use: Never         Review of Systems:  Review of Systems   Constitutional:  Negative for chills and fever.   HENT:  Negative for congestion, rhinorrhea and sore throat.    Eyes:  Negative for pain and visual disturbance.   Respiratory:  Negative for apnea, cough, chest tightness and shortness of breath.    Cardiovascular:  Negative for chest pain and palpitations.   Gastrointestinal:  Positive for vomiting. Negative for abdominal pain, diarrhea and nausea.   Genitourinary:  Negative for difficulty urinating and dysuria.   Musculoskeletal:  Negative for joint swelling and myalgias.   Skin:  Negative for color change.   Neurological:  Negative for seizures and headaches.   Psychiatric/Behavioral: Negative.     All other systems reviewed and are negative.       Physical Exam:  BP (!) 181/91   Pulse 103   Temp 97.9 °F (36.6 °C)   Resp 16   Ht 175.3 cm (69\")   Wt 104 kg (228 lb 13.4 oz)   SpO2 99%   BMI 33.79 kg/m²     Physical Exam  Vitals and nursing note reviewed.   Constitutional:       General: He is not in acute distress.     Appearance: Normal appearance. He is not " toxic-appearing.   HENT:      Head: Normocephalic and atraumatic.      Jaw: There is normal jaw occlusion.   Eyes:      General: Lids are normal.      Extraocular Movements: Extraocular movements intact.      Conjunctiva/sclera: Conjunctivae normal.      Pupils: Pupils are equal, round, and reactive to light.   Cardiovascular:      Rate and Rhythm: Normal rate and regular rhythm.      Pulses: Normal pulses.      Heart sounds: Normal heart sounds.   Pulmonary:      Effort: Pulmonary effort is normal. No respiratory distress.      Breath sounds: Normal breath sounds. No wheezing or rhonchi.   Abdominal:      General: Abdomen is flat.      Palpations: Abdomen is soft.      Tenderness: There is no abdominal tenderness. There is no guarding or rebound.   Musculoskeletal:         General: Normal range of motion.      Cervical back: Normal range of motion and neck supple.      Right lower leg: No edema.      Left lower leg: No edema.   Skin:     General: Skin is warm and dry.   Neurological:      Mental Status: He is alert and oriented to person, place, and time. Mental status is at baseline.   Psychiatric:         Mood and Affect: Mood normal.                    Medical Decision Making:      Comorbidities that affect care:    Diabetes    External Notes reviewed:    Previous Clinic Note: Patient was last seen in clinic for diabetes.      The following orders were placed and all results were independently analyzed by me:  Orders Placed This Encounter   Procedures    XR Chest 1 View    CT Abdomen Pelvis Without Contrast    [place] Draw    Comprehensive Metabolic Panel    BNP    High Sensitivity Troponin T    Lipase    Urinalysis With Microscopic If Indicated (No Culture) - Urine, Clean Catch    Lactic Acid, Plasma    CBC Auto Differential    High Sensitivity Troponin T 1Hr    Urinalysis, Microscopic Only - Urine, Clean Catch    NPO Diet NPO Type: Strict NPO    Undress & Gown    Continuous Pulse Oximetry    Vital Signs     Undress & Gown    Code Status and Medical Interventions: CPR (Attempt to Resuscitate); Full Support    Gastroenterology (on-call MD unless specified)    Inpatient Hospitalist Consult    Oxygen Therapy- Nasal Cannula; Titrate 1-6 LPM Per SpO2; 90 - 95%    ECG 12 Lead ED Triage Standing Order; SOA    Insert Peripheral IV    Insert Peripheral IV    Inpatient Admission    CBC & Differential    Green Top (Gel)    Lavender Top    Gold Top - SST    Light Blue Top       Medications Given in the Emergency Department:  Medications   sodium chloride 0.9 % flush 10 mL (has no administration in time range)   sodium chloride 0.9 % flush 10 mL (has no administration in time range)   ondansetron (ZOFRAN) injection 4 mg (4 mg Intravenous Given 3/16/25 1556)   sodium chloride 0.9 % bolus 1,000 mL (1,000 mL Intravenous New Bag 3/16/25 1556)   pantoprazole (PROTONIX) injection 40 mg (40 mg Intravenous Given 3/16/25 1711)        ED Course:         Labs:    Lab Results (last 24 hours)       Procedure Component Value Units Date/Time    CBC & Differential [[id number]]  (Abnormal) Collected: 03/16/25 1443    Specimen: Blood Updated: 03/16/25 1451    Narrative:      The following orders were created for panel order CBC & Differential.  Procedure                               Abnormality         Status                     ---------                               -----------         ------                     CBC Auto Differential[[id number]]        Abnormal            Final result                 Please view results for these tests on the individual orders.    Comprehensive Metabolic Panel [[id number]]  (Abnormal) Collected: 03/16/25 1443    Specimen: Blood Updated: 03/16/25 1515     Glucose 124 mg/dL      BUN 20 mg/dL      Creatinine 2.09 mg/dL      Sodium 140 mmol/L      Potassium 3.8 mmol/L      Chloride 101 mmol/L      CO2 28.9 mmol/L      Calcium 9.2 mg/dL      Total Protein 7.3 g/dL      Albumin 2.9 g/dL      ALT (SGPT) 30 U/L      AST  (SGOT) 34 U/L      Alkaline Phosphatase 196 U/L      Total Bilirubin 0.2 mg/dL      Globulin 4.4 gm/dL      A/G Ratio 0.7 g/dL      BUN/Creatinine Ratio 9.6     Anion Gap 10.1 mmol/L      eGFR 35.8 mL/min/1.73     Narrative:      GFR Categories in Chronic Kidney Disease (CKD)      GFR Category          GFR (mL/min/1.73)    Interpretation  G1                     90 or greater         Normal or high (1)  G2                      60-89                Mild decrease (1)  G3a                   45-59                Mild to moderate decrease  G3b                   30-44                Moderate to severe decrease  G4                    15-29                Severe decrease  G5                    14 or less           Kidney failure          (1)In the absence of evidence of kidney disease, neither GFR category G1 or G2 fulfill the criteria for CKD.    eGFR calculation 2021 CKD-EPI creatinine equation, which does not include race as a factor    BNP [[id number]]  (Abnormal) Collected: 03/16/25 1443    Specimen: Blood Updated: 03/16/25 1512     proBNP 1,518.0 pg/mL     Narrative:      This assay is used as an aid in the diagnosis of individuals suspected of having heart failure. It can be used as an aid in the diagnosis of acute decompensated heart failure (ADHF) in patients presenting with signs and symptoms of ADHF to the emergency department (ED). In addition, NT-proBNP of <300 pg/mL indicates ADHF is not likely.    Age Range Result Interpretation  NT-proBNP Concentration (pg/mL:      <50             Positive            >450                   Gray                 300-450                    Negative             <300    50-75           Positive            >900                  Gray                300-900                  Negative            <300      >75             Positive            >1800                  Gray                300-1800                  Negative            <300    High Sensitivity Troponin T [[id number]]  (Abnormal)  Collected: 03/16/25 1443    Specimen: Blood Updated: 03/16/25 1531     HS Troponin T 132 ng/L     Narrative:      High Sensitive Troponin T Reference Range:  <14.0 ng/L- Negative Female for AMI  <22.0 ng/L- Negative Male for AMI  >=14 - Abnormal Female indicating possible myocardial injury.  >=22 - Abnormal Male indicating possible myocardial injury.   Clinicians would have to utilize clinical acumen, EKG, Troponin, and serial changes to determine if it is an Acute Myocardial Infarction or myocardial injury due to an underlying chronic condition.         Lipase [[id number]]  (Abnormal) Collected: 03/16/25 1443    Specimen: Blood Updated: 03/16/25 1515     Lipase 7 U/L     Lactic Acid, Plasma [[id number]]  (Normal) Collected: 03/16/25 1443    Specimen: Blood Updated: 03/16/25 1514     Lactate 1.2 mmol/L     CBC Auto Differential [[id number]]  (Abnormal) Collected: 03/16/25 1443    Specimen: Blood Updated: 03/16/25 1451     WBC 11.72 10*3/mm3      RBC 3.35 10*6/mm3      Hemoglobin 9.2 g/dL      Hematocrit 29.9 %      MCV 89.3 fL      MCH 27.5 pg      MCHC 30.8 g/dL      RDW 15.2 %      RDW-SD 49.2 fl      MPV 8.7 fL      Platelets 516 10*3/mm3      Neutrophil % 72.5 %      Lymphocyte % 14.2 %      Monocyte % 7.6 %      Eosinophil % 4.8 %      Basophil % 0.6 %      Immature Grans % 0.3 %      Neutrophils, Absolute 8.50 10*3/mm3      Lymphocytes, Absolute 1.66 10*3/mm3      Monocytes, Absolute 0.89 10*3/mm3      Eosinophils, Absolute 0.56 10*3/mm3      Basophils, Absolute 0.07 10*3/mm3      Immature Grans, Absolute 0.04 10*3/mm3      nRBC 0.0 /100 WBC     High Sensitivity Troponin T 1Hr [[id number]]  (Abnormal) Collected: 03/16/25 1555    Specimen: Blood Updated: 03/16/25 1639     HS Troponin T 125 ng/L      Troponin T Numeric Delta -7 ng/L      Troponin T % Delta -5    Narrative:      High Sensitive Troponin T Reference Range:  <14.0 ng/L- Negative Female for AMI  <22.0 ng/L- Negative Male for AMI  >=14 - Abnormal Female  indicating possible myocardial injury.  >=22 - Abnormal Male indicating possible myocardial injury.   Clinicians would have to utilize clinical acumen, EKG, Troponin, and serial changes to determine if it is an Acute Myocardial Infarction or myocardial injury due to an underlying chronic condition.         Urinalysis With Microscopic If Indicated (No Culture) - Indwelling Urethral Catheter [[id number]]  (Abnormal) Collected: 03/16/25 1604    Specimen: Urine from Indwelling Urethral Catheter Updated: 03/16/25 1638     Color, UA Yellow     Appearance, UA Clear     pH, UA 7.0     Specific Gravity, UA 1.014     Glucose,  mg/dL (Trace)     Ketones, UA Trace     Bilirubin, UA Negative     Blood, UA Moderate (2+)     Protein, UA >=300 mg/dL (3+)     Leuk Esterase, UA Trace     Nitrite, UA Negative     Urobilinogen, UA 0.2 E.U./dL    Urinalysis, Microscopic Only - Indwelling Urethral Catheter [[id number]]  (Abnormal) Collected: 03/16/25 1604    Specimen: Urine from Indwelling Urethral Catheter Updated: 03/16/25 1638     RBC, UA 6-10 /HPF      WBC, UA 3-5 /HPF      Bacteria, UA 1+ /HPF      Squamous Epithelial Cells, UA None Seen /HPF      Yeast, UA Large/3+ Budding Yeast /HPF      Hyaline Casts, UA 7-12 /LPF      Methodology Manual Light Microscopy             Imaging:    CT Abdomen Pelvis Without Contrast  Result Date: 3/16/2025  CT ABDOMEN PELVIS WO CONTRAST Date of Exam: 3/16/2025 3:16 PM EDT Indication: Flank pain, kidney stone suspected Abdominal pain flank pain. Comparison: CT abdomen pelvis 1/25/2025. Chest CT 1/24/2025. Technique: Axial CT images were obtained of the abdomen and pelvis without the administration of contrast. Reconstructed coronal and sagittal images were also obtained. Automated exposure control and iterative construction methods were used. Findings: Included Chest: Bibasal atelectasis. Bronchiectasis in the left lower lobe. Mildly decreased tree-in-bud nodular opacities in the left lower lobe  since 1/24/2025, though these are partially imaged and would recommend referring to prior chest CT report for further recommendations. Trace pericardial effusion. Liver: No significant abnormality.  Gallbladder and biliary tree: Cholecystectomy  Spleen: No significant abnormality.  Pancreas: No significant abnormality.  Adrenal glands: No significant abnormality.  Kidneys and ureters: No significant abnormality.  Stomach and duodenum:No significant abnormality. Small and large bowel: Status post diverting colostomy. Mild fat stranding at the colostomy, likely postsurgical change. Colonic diverticulosis. No evidence of bowel obstruction. Normal-appearing appendix. Peritoneal cavity: No free fluid or free air. Bladder: Doyle in decompressed bladder  Pelvic organs: No significant abnormality.  Vasculature: Atherosclerotic calcifications.  Lymph nodes: No pathologic appearing lymph nodes by imaging criteria.  Bones and soft tissues: Degenerative changes of the imaged spine. No acute osseous abnormality. Left proximal femoral fixation hardware. Interval umbilical hernia repair with mild amount of underlying fat stranding/inflammatory change, favored postsurgical. No organized fluid collection in the region of the previously seen perianal abscess.     Impression: No acute findings in the abdomen/pelvis. Status post diverting colostomy. Mild fat stranding at the colostomy, likely postsurgical change. No evidence of bowel obstruction. Interval umbilical hernia repair with mild amount of underlying fat stranding/inflammatory change, favored postsurgical. Mildly decreased tree-in-bud nodular opacities in the left lower lobe since 1/24/2025, though these are partially imaged and would recommend referring to prior chest CT report for further recommendations. Electronically Signed: Farshad Soriano  3/16/2025 3:55 PM EDT  Workstation ID: [id number]    XR Chest 1 View  Result Date: 3/16/2025  XR CHEST 1 VW Date of Exam: 3/16/2025  2:50 PM EDT Indication: SOA Triage Protocol Comparison: February 5, 2025 Findings: The patient is somewhat rotated. A right subclavian port has its tip at the mid SVC. A left subclavian catheter has its tip at the upper SVC. There are coarse bilateral interstitial markings. There is a small right pleural effusion. The heart and mediastinal contours appear stable. There are degenerative changes along the right shoulder.     Impression: 1.Coarse bilateral interstitial markings, which could reflect interstitial pulmonary edema or atypical pneumonia. 2.Small right pleural effusion. Electronically Signed: Fransico Truong MD  3/16/2025 3:32 PM EDT  Workstation ID: [id number]        Differential Diagnosis and Discussion:    Vomiting: Differential diagnosis includes but is not limited to migraine, labyrinthine disorders, psychogenic, metabolic and endocrine causes, peptic ulcer, gastric outlet obstruction, gastritis, gastroenteritis, appendicitis, intestinal obstruction, paralytic ileus, food poisoning, cholecystitis, acute hepatitis, acute pancreatitis, acute febrile illness, and myocardial infarction.    PROCEDURES:    Labs were collected in the emergency department and all labs were reviewed and interpreted by me.  CT scan was performed in the emergency department and the CT scan radiology impression was interpreted by me.    ECG 12 Lead ED Triage Standing Order; SOA   Preliminary Result   HEART HJDX=986  bpm   RR Gczrshxy=772  ms   SD Wmfhkyaz=147  ms   P Horizontal Axis=  deg   P Front Axis=27  deg   QRSD Interval=87  ms   QT Xoqzuwtv=016  ms   DNaJ=360  ms   QRS Axis=29  deg   T Wave Axis=185  deg   - BORDERLINE ECG -   Sinus tachycardia   Borderline prolonged SD interval   Borderline repolarization abnormality   Date and Time of Study:2025-03-16 15:10:57          Procedures    MDM     Amount and/or Complexity of Data Reviewed  Decide to obtain previous medical records or to obtain history from someone other than the  patient: yes       The patient´s CBC that was reviewed and interpreted by me shows no abnormalities of critical concern. Of note, there is no anemia requiring a blood transfusion and the platelet count is acceptable.  CMP shows a creatinine of 2 which is chronic for the patient.  CT scan of the abdomen pelvis is negative for acute intra-abdominal pathology.  Patient is given Protonix in the emergency department.      Total Critical Care time of 40 minutes. Total critical care time documented does not include time spent on separately billed procedures for services of nurses or physician assistants. I personally saw and examined the patient. I have reviewed all diagnostic interpretations and treatment plans as written. I was present for the key portions of any procedures performed and the inclusive time noted in any critical care statement. Critical care time includes patient management by me, time spent at the patients bedside,  time to review lab and imaging results, discussing patient care, documentation in the medical record, and time spent with family or caregiver.          Patient Care Considerations:    None      Consultants/Shared Management Plan:    Case was discussed with Dr. Wolf who agrees to consult.  Case was discussed with Dr. Leal who agrees to admit the patient.    Social Determinants of Health:    Patient is independent, reliable, and has access to care.       Disposition and Care Coordination:    Admit:   Through independent evaluation of the patient's history, physical, and imperical data, the patient meets criteria for inpatient admission to the hospital.        Final diagnoses:   Gastrointestinal hemorrhage, unspecified gastrointestinal hemorrhage type        ED Disposition       ED Disposition   Decision to Admit    Condition   --    Comment   Level of Care: Telemetry [5]   Diagnosis: GI bleed [[id number]]   Certification: I Certify That Inpatient Hospital Services Are Medically Necessary For  Greater Than 2 Midnights                 This medical record created using voice recognition software.             Claudia Chinchilla MD  03/16/25 [id number]

## 2025-03-16 NOTE — PAYOR COMM NOTE
"Preston Wallis \"Gómez\" (59 y.o. Male)     PATIENT INFORMATION  Name:  Preston Wallis  MRN#:     [id number]  :  1965       ADMISSION INFORMATION  CLASS: Inpatient   DOS:  3/15/2025    CURRENT ATTENDING PROVIDER INFORMATION  Name/NPI: Lino Leal Jr, MD (NPI: [id number])   Phone:  Phone: [phone]  Fax:  [phone]    REQUESTING PROVIDER and RENDERING FACILITY  Name:  [facility]   NPI:  [id number]  TID:  [id number]  Address:      [facility] [address] [place] [address]  Phone:               [phone]  Fax:  [phone]    UTILIZATION REVIEW CONTACT INFORMATION  Phone:      [phone]  Fax:           [phone]    ADMISSION DIAGNOSIS  GI bleed [K92.2]    +++++++++++++++++++++++++++++++++++++++++++++++++++++++++++++++++++++++++++++++        Date of Birth   1965    Social Security Number       Address   [address] [place] [address]    Home Phone   [phone]    MRN   [id number]       Jain   Adventism    Marital Status                               Admission Date   3/16/2025    Admission Type   Emergency    Admitting Provider       Attending Provider   Claudia Chinchilla MD    Department, Room/Bed   [facility] EMERGENCY ROOM,        Discharge Date       Discharge Disposition       Discharge Destination                                 Attending Provider: Claudia Chinchilla MD    Allergies: Benadryl [Diphenhydramine], Proventil [Albuterol]    Isolation: None   Infection: MRSA/History Only (12/15/23), VRE (25), MDR Pseudomonas (25), CR Pseudomonas CRPA (25)   Code Status: CPR    Ht: 175.3 cm (69\")   Wt: 104 kg (228 lb 13.4 oz)    Admission Cmt: None   Principal Problem: GI bleed [K92.2]                   Active Insurance as of 3/16/2025       Primary Coverage       Payor Plan Insurance Group Employer/Plan Group    [facility] BY YING KELLEY BY YING [id number]       Payor Plan Address Payor Plan " Phone Number Payor Plan Fax Number Effective Dates    [address] [address]   3/1/2024 - None Entered    [facility] [phone]         Subscriber Name Subscriber Birth Date Member ID       RICH URIARTE 1965 [id number]                        Gastrointestinal Bleeding, [facility]       Indications Met   Last updated by Polly Hernandez, LEN on 3/16/2025 [id number]     Review Status Created By   Primary Completed Polly Hernandez RN      Criteria Review   Gastrointestinal Bleeding, [facility]     Overall Determination: Indications Met     Criteria:  [×] Admission is indicated for  1 or more  of the following :      [×] Hemodynamic instability          3/16/2025  5:57 PM              -- 3/16/2025  5:57 PM by Polly Hernandez, LEN --                                    (X) Hemodynamic instability, as indicated by  1 or more  of the following  (1) (2) (3) (4) (5) (6):                  (X) Vital sign abnormality not readily corrected by appropriate treatment, as indicated by  1 or more  of the following  [A]:                  (X) Tachycardia that persists despite appropriate treatment (eg, volume repletion, treatment of pain, treatment of underlying cause)          3/16/2025  5:57 PM              -- 3/16/2025  5:57 PM by Polly Hernandez, LEN --                  -114 despite IV NS 1000ml bolus, Zofran IV & Protonix IV     Notes:  -- 3/16/2025  5:57 PM by Polly Hernandez, RN --      Subject: Admission      To ED from [facility] after vomiting coffee ground emesis intermittently since yesterday. Denies Abd pain, CP or SOA. No diarrhea or dysuria.       -114. Lungs diminished. Abd soft. AAOx3      PMHx:      Pt was d/c'd 2/20/25 after prolonged hospitalization for MDRO Pseudomonas PNA with IV Merrem & Jt nebs. Also tx for perianal abscess with Enterococcus VRE & MDR Citrobacter. Also had diverting loop colostomy due to poor wound healing.       Has been in [facility] since discharge.       On Eliquis.      CHF,  CKD, COPD, DM DVT, HTn, GERD, HLD, Morbid obesity, Polyneuropathy & bedbound.       Colostomy                  ED results:       CT abd: No acute findings.       CXR: 1.Coarse bilateral interstitial markings, which could reflect interstitial pulmonary edema or atypical pneumonia.      2.Small right pleural effusion.      Trops 132 & 125; BNP 1518.0.      WBC 11.7      H/H 9.2      Creatinine 2.09 (baseline), , GFR 35 (baseline)      UA: trace ketones, 2+ blood, trace leukocytes, 1+ bacteria                  In ED:       IV NS 1000ml bolus      Zofran IV      Protonix 40mg IV                  Admit-      Telemetry      GI consult      Clear liquid diet      NPO after MN      HOLD Eliquis      H/H q8h      Protonix IV q12h      Zofran IV prn      Duonebs q4h      Pulmicort/Brovana nebs bid      T&S                History & Physical        Lino Leal Jr., MD at [address] [place] [address] HISTORY AND PHYSICAL  Date: 3/16/2025   Patient Name: Preston Wallis  : 1965  MRN: [id number]  Primary Care Physician:  Kimmy Riley MD  Date of admission: 3/16/2025    Subjective  Subjective     Chief Complaint: Coffee-ground emesis    HPI:    Preston Wallis is a 59 y.o. male past medical history of CKD stage III, diabetes mellitus, COPD that presented to the emergency department for evaluation of coffee-ground emesis.  Patient has been residing in a nursing facility since previous admission discharged on .  States that since yesterday a.m. he has had intermittent episodes of coffee-ground emesis.  Denies any other fevers, chills, sweats, chest pain, shortness of breath, palpitations, abdominal pain, diarrhea constipation, dysuria, new weakness or rash.  In the emergency department hemoglobin found to be stable from previous discharge.  GI was notified patient started on Protonix and will be admitted for ongoing monitoring and management.    Of note, patient with previous  "prolonged hospitalization discharged February 20 as above.  During that hospital stay treated for MDRO Pseudomonas pneumonia with IV meropenem and tobramycin nebs.  To continue on 30 days on and 30 days off tobramycin nebs.  Completed 30 days on while in house and scheduled for outpatient follow-up with pulmonology for ongoing management.  Patient was also treated for perianal abscess with Enterococcus VRE and MDR Citrobacter which was treated with Zyvox and Zosyn per DC summary.  Subsequently underwent diverting loop colostomy due to poor wound healing.      Personal History     Past Medical History:  Past Medical History:   Diagnosis Date   • 1. Incision and drainage of posterior perianal abscess 2. Sharp excisional debridement through skin and subcutaneous tissue in the posterior perianal area, 9 x 6 x 1 cm 01/26/2025   • Age-related cognitive decline    • Allergic contact dermatitis    • Allergies    • Anemia    • Bedbound     11/2023 \"MY LEG MUSCLES STOPPED WORKING\"   • Bronchiectasis with acute lower respiratory infection    • Charcot foot due to diabetes mellitus 09/10/2013   • Chronic diastolic (congestive) heart failure    • Chronic kidney disease    • Chronic respiratory failure with hypoxia    • Closed supracondylar fracture of femur 01/12/2022   • COPD (chronic obstructive pulmonary disease)    • Deep vein thrombosis (DVT) of lower extremity associated with air travel 01/13/2023   • Dependence on supplemental oxygen    • Eczema    • Erectile dysfunction     due to organic reasons   • Essential (primary) hypertension    • Fracture     closed fracture of other tarsal and metatarsal bones   • Fracture of proximal humerus 01/13/2023   • GERD without esophagitis    • High risk medication use    • Hypercholesteremia    • Hypomagnesemia    • Infected stasis ulcer of left lower extremity 01/13/2023   • Insomnia    • Low back pain    • Major depressive disorder    • Morbid (severe) obesity due to excess calories  " "  • MRSA pneumonia    • Muscle weakness    • 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 without evidence of necrosis    • Obstructive sleep apnea (adult) (pediatric)    • On home O2     REPORTS WEARING 2L/NC AAT   • Other forms of dyspnea    • Other long term (current) drug therapy    • Other specified noninfective gastroenteritis and colitis    • Other spondylosis, lumbar region    • Pain in both knees    • Paroxysmal atrial fibrillation    • Peripheral neuropathy     attributed to type 2 diabetes   • Pneumonia, unspecified organism    • Polyneuropathy    • Rash and other nonspecific skin eruption    • Self-catheterizes urinary bladder     EVERY 4 HOURS   • Smoking     \"SOMETIMES\"   • Syncope and collapse    • Tachycardia    • Tinnitus 01/13/2023   • Type 1 diabetes mellitus with diabetic chronic kidney disease    • Type 2 diabetes mellitus    • Unspecified fall, initial encounter    • Urinary retention          Past Surgical History:  Past Surgical History:   Procedure Laterality Date   • BRONCHOSCOPY N/A 1/28/2025    Procedure: BRONCHOSCOPY: BAL: insertion of lighted instrument to view inside the lung;  Surgeon: Walter Nicole DO;  Location: [facility] OR;  Service: Pulmonary;  Laterality: N/A;   • BRONCHOSCOPY N/A 2/3/2025    Procedure: BRONCHOSCOPY WITH BRONCHOALVEOLAR LAVAGE, POSSIBLE BIOPSY, BRUSHING, WASHING, AIRWAY INSPECTION: insertion of lighted instrument to view inside the lung;  Surgeon: Chadd Swan MD;  Location: [facility] MAIN OR;  Service: Pulmonary;  Laterality: N/A;   • CARDIAC CATHETERIZATION Left 8/15/2024    Procedure: Carbon dioxide aortogram with left leg angiogram, possible angioplasty or stenting;  Surgeon: Moshe Willson MD;  Location: [facility] CATH INVASIVE LOCATION;  Service: Vascular;  Laterality: Left;   • CHOLECYSTECTOMY     • COLOSTOMY N/A 2/6/2025    Procedure: COLOSTOMY LAPAROSCOPIC; plain text: creation of colostomy using small " incisions;  Surgeon: Emerson Canada MD;  [facility];  Service: General;  Laterality: N/A;   • CYSTOSCOPY     • FEMUR SURGERY Left     Shravan placed   • INCISION AND DRAINAGE ABSCESS N/A 2025    Procedure: INCISION AND DRAINAGE ABSCESS; plain text: incision and drain pus from buttocks wound;  Surgeon: Emerson Canada MD;  [place] [facility] [place] [facility]  Service: General;  Laterality: N/A;   • KNEE SURGERY Left    • OTHER SURGICAL HISTORY Left     venous port, REMOVED   • PORTACATH PLACEMENT Right    • TIBIAL PLATEAU OPEN REDUCTION INTERNAL FIXATION Left 2023    Procedure: TIBIAL PLATEAU OPEN REDUCTION INTERNAL FIXATION;  Surgeon: Hugo Kline MD;  Location: [facility] OR;  Service: Orthopedics;  Laterality: Left;   • TONSILLECTOMY AND ADENOIDECTOMY           Family History:   Family History   Problem Relation Age of Onset   • Coronary artery disease Mother    • Hypertension Mother    • Diabetes type II Mother    • Asthma Father    • Diabetes type II Sister    • Cancer Sister    • Malig Hyperthermia Neg Hx          Social History:   Social History     Tobacco Use   • Smoking status: Former     Current packs/day: 0.00     Average packs/day: 1 pack/day for 12.0 years (12.0 ttl pk-yrs)     Types: Cigarettes     Start date:      Quit date:      Years since quittin.2     Passive exposure: Past   • Smokeless tobacco: Never   Vaping Use   • Vaping status: Never Used   Substance Use Topics   • Alcohol use: Not Currently   • Drug use: Never           Home Medications:  FreeStyle Bethany 3 Plus Sensor, FreeStyle Bethany 3 [place], GNP One Daily Plus Iron, Insulin Lispro (1 Unit Dial), Magnesium Oxide -Mg Supplement, O2, Semaglutide (1 MG/DOSE), Vitamin D3, amoxicillin-clavulanate, apixaban, arformoterol, aspirin, atorvastatin, budesonide, busPIRone, calcium citrate, collagenase, dapagliflozin, dilTIAZem CD, docusate sodium, famotidine, ferrous gluconate, ferrous sulfate, finasteride, folic  acid, gabapentin, hydrALAZINE, insulin detemir, ipratropium-albuterol, lisinopril, metFORMIN ER, metoprolol tartrate, montelukast, tamsulosin, tiotropium, tobramycin PF, and vitamin C    Allergies:  Allergies   Allergen Reactions   • Benadryl [Diphenhydramine] Itching   • Proventil [Albuterol] Other (See Comments)     Mouth sores         Review of Systems   All systems were reviewed and negative except for: Coffee-ground emesis    Objective  Objective     Vitals:   Temp:  [97.9 °F (36.6 °C)] 97.9 °F (36.6 °C)  Heart Rate:  [103-114] 103  Resp:  [16] 16  BP: (163-187)/(91-94) 181/91  Flow (L/min) (Oxygen Therapy):  [2] 2    Physical Exam    Constitutional: Awake, alert, no acute distress   Eyes: Pupils equal, sclerae anicteric, no conjunctival injection   HENT: NCAT, mucous membranes moist   Neck: Supple, no thyromegaly, no lymphadenopathy, trachea midline   Respiratory: Diminished bilaterally but nonlabored   Cardiovascular: RRR, no murmurs, rubs, or gallops, palpable pedal pulses bilaterally   Gastrointestinal: Positive bowel sounds, soft, nontender, nondistended, colostomy noted   Musculoskeletal: No bilateral ankle edema, no clubbing or cyanosis to extremities   Psychiatric: Appropriate affect, cooperative   Neurologic: Oriented x 3, strength symmetric in all extremities, Cranial Nerves grossly intact to confrontation, speech clear   Skin: No rashes     Result Review   Result Review:  I have personally reviewed the results from the time of this admission to 3/16/2025 17:18 EDT and agree with these findings:  [x]  Laboratory  []  Microbiology  [x]  Radiology  []  EKG/Telemetry   []  Cardiology/Vascular   []  Pathology  []  Old records  []  Other:      Assessment & Plan  Assessment / Plan     Assessment/Plan:   Hematemesis: GI called from the ER, appreciate recommendations.  Will give clear liquid diet for now and n.p.o. at midnight.  Continue on his IV twice daily.  Check type and screen and coags.  Monitor serial  H&H and transfuse for active signs of bleeding or hemoglobin less than 7.  Monitor on telemetry as well.  CKD stage III: Appears at baseline.  Renally dose medications and avoid nephrotoxins.  Monitor serial labs.  History of COPD without exacerbation: Will continue home regimen  Paroxysmal atrial fibrillation: Holding Eliquis due to suspected hematemesis.  Monitor on telemetry and resume other home medications once verified  Insulin-dependent diabetes mellitus: Insulin sliding scale, monitor requirements and adjust as needed  Recent MDRO Pseudomonas pneumonia: Per discharge summary patient is to be on a course of tobramycin neb treatment 30 days on and 30 days off.  Did reportedly complete 30 days on during previous hospitalization with outpatient pulmonology follow-up for ongoing management.  Urinary retention: Intermittently self caths at home.  Bladder scan as needed      VTE Prophylaxis:  Mechanical VTE prophylaxis orders are signed & held.          CODE STATUS:    Code Status (Patient has no pulse and is not breathing): CPR (Attempt to Resuscitate)  Medical Interventions (Patient has pulse or is breathing): Full Support      Admission Status:  I believe this patient meets inpatient status.    Electronically signed by Lino Leal Jr, MD, 03/16/25, 5:18 PM EDT.             Electronically signed by Lino Leal Jr., MD at 03/16/25 1730       Vital Signs (last day)       Date/Time Temp Temp src Pulse Resp BP Patient Position SpO2    03/16/25 1710 -- -- 103 -- -- -- 99    03/16/25 1640 -- -- 105 -- -- -- 100    03/16/25 1630 -- -- 104 -- 181/91 -- 100    03/16/25 1600 -- -- 106 -- 163/94 -- 99    03/16/25 1530 -- -- 111 -- -- -- 99    03/16/25 1515 -- -- 109 -- -- -- 100    03/16/25 1500 -- -- 111 -- -- -- 100    03/16/25 1437 -- -- -- -- -- -- 98    03/16/25 1430 97.9 (36.6) -- 113 16 187/92 -- 98    03/16/25 1429 -- -- 114 -- 187/92 -- 98          Current Facility-Administered Medications   Medication Dose  Route Frequency Provider Last Rate Last Admin   • sodium chloride 0.9 % flush 10 mL  10 mL Intravenous PRN Claudia Chinchilla MD       • sodium chloride 0.9 % flush 10 mL  10 mL Intravenous PRN Claudia Chinchilla MD         Current Outpatient Medications   Medication Sig Dispense Refill   • amoxicillin-clavulanate (AUGMENTIN) 875-125 MG per tablet      • arformoterol (BROVANA) 15 MCG/2ML nebulizer solution Take 2 mL by nebulization 2 (Two) Times a Day. 360 mL 3   • Aspirin Low Dose 81 MG EC tablet TAKE 1 TABLET BY MOUTH EVERY MORNING 30 tablet 3   • atorvastatin (LIPITOR) 20 MG tablet TAKE 1 TABLET BY MOUTH EVERY NIGHT AT BEDTIME 30 tablet 3   • budesonide (Pulmicort) 0.5 MG/2ML nebulizer solution Take 2 mL by nebulization 2 (Two) Times a Day. 360 mL 3   • busPIRone (BUSPAR) 15 MG tablet TAKE 1 TABLET BY MOUTH TWICE DAILY 60 tablet 3   • calcium citrate (CALCITRATE) 950 (200 Ca) MG tablet TAKE 1 TABLET BY MOUTH EVERY NIGHT AT BEDTIME 30 tablet 3   • Cholecalciferol (Vitamin D3) 50 MCG (2000 UT) tablet TAKE 1 TABLET BY MOUTH EVERY MORNING 30 tablet 3   • collagenase 250 UNIT/GM ointment Apply 1 Application topically to the appropriate area as directed As Needed (To be applied to figueora-rectal wound with wound VAC dressing changes three times a week as needed for slough.). 90 g 1   • collagenase 250 UNIT/GM ointment Apply 1 Application topically to the appropriate area as directed Daily.     • Continuous Glucose  (FreeStyle Bethany 3 [place]) device See Admin Instructions.     • Continuous Glucose Sensor (FreeStyle Bethany 3 Plus Sensor) Use 2 each Every 30 (Thirty) Days. Apply as directed every 15 days 2 each 5   • dilTIAZem CD (CARDIZEM CD) 240 MG 24 hr capsule Take 1 capsule by mouth Daily.     • docusate sodium (COLACE) 100 MG capsule TAKE 1 CAPSULE BY MOUTH TWICE DAILY 60 capsule 3   • Eliquis 5 MG tablet tablet TAKE 1 TABLET BY MOUTH EVERY TWELVE HOURS 60 tablet 3   • famotidine (PEPCID) 40 MG tablet TAKE 1  TABLET BY MOUTH EVERY MORNING 30 tablet 3   • Farxiga 5 MG tablet tablet TAKE 1 TABLET BY MOUTH EVERY MORNING 30 tablet 3   • ferrous gluconate (FERGON) 324 MG tablet TAKE 1 TABLET BY MOUTH EVERY MORNING 30 tablet 3   • ferrous sulfate 325 (65 FE) MG tablet Take 1 tablet by mouth.     • finasteride (PROSCAR) 5 MG tablet TAKE 1 TABLET BY MOUTH EVERY NIGHT AT BEDTIME 30 tablet 3   • folic acid (FOLVITE) 1 MG tablet TAKE 1 TABLET BY MOUTH EVERY NIGHT AT BEDTIME 30 tablet 3   • gabapentin (NEURONTIN) 300 MG capsule Take 1 capsule by mouth.     • hydrALAZINE (APRESOLINE) 50 MG tablet Take 1 tablet by mouth.     • insulin detemir (Levemir) 100 UNIT/ML injection Inject 5 Units under the skin into the appropriate area as directed Every Night for 180 days. 15 mL 1   • Insulin Lispro, 1 Unit Dial, (HUMALOG) 100 UNIT/ML solution pen-injector Inject 5 Units under the skin into the appropriate area as directed 3 (Three) Times a Day Before Meals. 15 mL 0   • ipratropium-albuterol (DUO-NEB) 0.5-2.5 mg/3 ml nebulizer Take 3 mL by nebulization Every 4 (Four) Hours As Needed for Wheezing or Shortness of Air. 360 mL 5   • Levemir FlexPen 100 UNIT/ML injection      • lisinopril (PRINIVIL,ZESTRIL) 10 MG tablet Take 1 tablet by mouth Daily.     • Magnesium Oxide -Mg Supplement 400 (240 Mg) MG tablet Take 1 tablet by mouth every night at bedtime.     • metFORMIN ER (GLUCOPHAGE-XR) 500 MG 24 hr tablet Take 1 tablet by mouth.     • metoprolol tartrate (LOPRESSOR) 100 MG tablet Take 1 tablet by mouth.     • montelukast (SINGULAIR) 10 MG tablet Take 1 tablet by mouth Every Night. 30 tablet 5   • Multiple Vitamins-Iron (GNP One Daily Plus Iron) tablet TAKE 1 TABLET BY MOUTH EVERY MORNING 30 each 3   • O2 (OXYGEN) 2 Liter O2 - CONTINUOUS (route: Oxygen)     • Semaglutide, 1 MG/DOSE, (Ozempic, 1 MG/DOSE,) 4 MG/3ML solution pen-injector Inject 1 mg under the skin into the appropriate area as directed 1 (One) Time Per Week. (Patient not taking:  Reported on 3/14/2025) 3 mL 5   • tamsulosin (FLOMAX) 0.4 MG capsule 24 hr capsule TAKE 1 CAPSULE BY MOUTH EVERY NIGHT AT BEDTIME 30 capsule 3   • tiotropium (SPIRIVA) 18 MCG per inhalation capsule Place 1 capsule into inhaler and inhale Daily. 30 capsule 5   • tobramycin PF (MOIZ) 300 MG/5ML nebulizer solution Take 5 mL by nebulization 2 (Two) Times a Day. nebulize 1 vial BY MOUTH TWICE DAILY FOR 28 DAYS ON AND 28 DAYS OFF     • vitamin C (ASCORBIC ACID) 500 MG tablet TAKE 1 TABLET BY MOUTH TWICE DAILY 60 tablet 3     Lab Results (last 24 hours)       Procedure Component Value Units Date/Time    High Sensitivity Troponin T 1Hr [[id number]]  (Abnormal) Collected: 03/16/25 1555    Specimen: Blood Updated: 03/16/25 1639     HS Troponin T 125 ng/L      Troponin T Numeric Delta -7 ng/L      Troponin T % Delta -5    Narrative:      High Sensitive Troponin T Reference Range:  <14.0 ng/L- Negative Female for AMI  <22.0 ng/L- Negative Male for AMI  >=14 - Abnormal Female indicating possible myocardial injury.  >=22 - Abnormal Male indicating possible myocardial injury.   Clinicians would have to utilize clinical acumen, EKG, Troponin, and serial changes to determine if it is an Acute Myocardial Infarction or myocardial injury due to an underlying chronic condition.         Urinalysis With Microscopic If Indicated (No Culture) - Indwelling Urethral Catheter [[id number]]  (Abnormal) Collected: 03/16/25 3605    Specimen: Urine from Indwelling Urethral Catheter Updated: 03/16/25 1638     Color, UA Yellow     Appearance, UA Clear     pH, UA 7.0     Specific Gravity, UA 1.014     Glucose,  mg/dL (Trace)     Ketones, UA Trace     Bilirubin, UA Negative     Blood, UA Moderate (2+)     Protein, UA >=300 mg/dL (3+)     Leuk Esterase, UA Trace     Nitrite, UA Negative     Urobilinogen, UA 0.2 E.U./dL    Urinalysis, Microscopic Only - Indwelling Urethral Catheter [[id number]]  (Abnormal) Collected: 03/16/25 9712    Specimen: Urine  from Indwelling Urethral Catheter Updated: 03/16/25 1638     RBC, UA 6-10 /HPF      WBC, UA 3-5 /HPF      Bacteria, UA 1+ /HPF      Squamous Epithelial Cells, UA None Seen /HPF      Yeast, UA Large/3+ Budding Yeast /HPF      Hyaline Casts, UA 7-12 /LPF      Methodology Manual Light Microscopy    High Sensitivity Troponin T [[id number]]  (Abnormal) Collected: 03/16/25 1443    Specimen: Blood Updated: 03/16/25 1531     HS Troponin T 132 ng/L     Narrative:      High Sensitive Troponin T Reference Range:  <14.0 ng/L- Negative Female for AMI  <22.0 ng/L- Negative Male for AMI  >=14 - Abnormal Female indicating possible myocardial injury.  >=22 - Abnormal Male indicating possible myocardial injury.   Clinicians would have to utilize clinical acumen, EKG, Troponin, and serial changes to determine if it is an Acute Myocardial Infarction or myocardial injury due to an underlying chronic condition.         Comprehensive Metabolic Panel [[id number]]  (Abnormal) Collected: 03/16/25 1443    Specimen: Blood Updated: 03/16/25 1515     Glucose 124 mg/dL      BUN 20 mg/dL      Creatinine 2.09 mg/dL      Sodium 140 mmol/L      Potassium 3.8 mmol/L      Chloride 101 mmol/L      CO2 28.9 mmol/L      Calcium 9.2 mg/dL      Total Protein 7.3 g/dL      Albumin 2.9 g/dL      ALT (SGPT) 30 U/L      AST (SGOT) 34 U/L      Alkaline Phosphatase 196 U/L      Total Bilirubin 0.2 mg/dL      Globulin 4.4 gm/dL      A/G Ratio 0.7 g/dL      BUN/Creatinine Ratio 9.6     Anion Gap 10.1 mmol/L      eGFR 35.8 mL/min/1.73     Narrative:      GFR Categories in Chronic Kidney Disease (CKD)      GFR Category          GFR (mL/min/1.73)    Interpretation  G1                     90 or greater         Normal or high (1)  G2                      60-89                Mild decrease (1)  G3a                   45-59                Mild to moderate decrease  G3b                   30-44                Moderate to severe decrease  G4                    15-29                 Severe decrease  G5                    14 or less           Kidney failure          (1)In the absence of evidence of kidney disease, neither GFR category G1 or G2 fulfill the criteria for CKD.    eGFR calculation 2021 CKD-EPI creatinine equation, which does not include race as a factor    Lipase [[id number]]  (Abnormal) Collected: 03/16/25 1443    Specimen: Blood Updated: 03/16/25 1515     Lipase 7 U/L     Lactic Acid, Plasma [[id number]]  (Normal) Collected: 03/16/25 1443    Specimen: Blood Updated: 03/16/25 1514     Lactate 1.2 mmol/L     BNP [[id number]]  (Abnormal) Collected: 03/16/25 1443    Specimen: Blood Updated: 03/16/25 1512     proBNP 1,518.0 pg/mL     Narrative:      This assay is used as an aid in the diagnosis of individuals suspected of having heart failure. It can be used as an aid in the diagnosis of acute decompensated heart failure (ADHF) in patients presenting with signs and symptoms of ADHF to the emergency department (ED). In addition, NT-proBNP of <300 pg/mL indicates ADHF is not likely.    Age Range Result Interpretation  NT-proBNP Concentration (pg/mL:      <50             Positive            >450                   Gray                 300-450                    Negative             <300    50-75           Positive            >900                  Gray                300-900                  Negative            <300      >75             Positive            >1800                  Gray                300-1800                  Negative            <300    [facility] [[id number]] Collected: 03/16/25 1443    Specimen: Blood Updated: 03/16/25 1500    Narrative:      The following orders were created for panel order [facility].  Procedure                               Abnormality         Status                     ---------                               -----------         ------                     Green Top (Gel)[[id number]]                                  Final result               Lavender  Top[[id number]]                                     Final result               Gold Top - SST[[id number]]                                   Final result               Light Blue Top[[id number]]                                   Final result                 Please view results for these tests on the individual orders.    Green Top (Gel) [[id number]] Collected: 03/16/25 1443    Specimen: Blood Updated: 03/16/25 1500     Extra Tube Hold for add-ons.     Comment: Auto resulted.       Lavender Top [[id number]] Collected: 03/16/25 1443    Specimen: Blood Updated: 03/16/25 1500     Extra Tube hold for add-on     Comment: Auto resulted       Gold Top - SST [[id number]] Collected: 03/16/25 1443    Specimen: Blood Updated: 03/16/25 1500     Extra Tube Hold for add-ons.     Comment: Auto resulted.       Light Blue Top [[id number]] Collected: 03/16/25 1443    Specimen: Blood Updated: 03/16/25 1500     Extra Tube Hold for add-ons.     Comment: Auto resulted       CBC & Differential [[id number]]  (Abnormal) Collected: 03/16/25 1443    Specimen: Blood Updated: 03/16/25 1451    Narrative:      The following orders were created for panel order CBC & Differential.  Procedure                               Abnormality         Status                     ---------                               -----------         ------                     CBC Auto Differential[[id number]]        Abnormal            Final result                 Please view results for these tests on the individual orders.    CBC Auto Differential [[id number]]  (Abnormal) Collected: 03/16/25 1443    Specimen: Blood Updated: 03/16/25 1451     WBC 11.72 10*3/mm3      RBC 3.35 10*6/mm3      Hemoglobin 9.2 g/dL      Hematocrit 29.9 %      MCV 89.3 fL      MCH 27.5 pg      MCHC 30.8 g/dL      RDW 15.2 %      RDW-SD 49.2 fl      MPV 8.7 fL      Platelets 516 10*3/mm3      Neutrophil % 72.5 %      Lymphocyte % 14.2 %      Monocyte % 7.6 %      Eosinophil % 4.8 %      Basophil % 0.6 %       Immature Grans % 0.3 %      Neutrophils, Absolute 8.50 10*3/mm3      Lymphocytes, Absolute 1.66 10*3/mm3      Monocytes, Absolute 0.89 10*3/mm3      Eosinophils, Absolute 0.56 10*3/mm3      Basophils, Absolute 0.07 10*3/mm3      Immature Grans, Absolute 0.04 10*3/mm3      nRBC 0.0 /100 WBC           Imaging Results (Last 24 Hours)       Procedure Component Value Units Date/Time    CT Abdomen Pelvis Without Contrast [[id number]] Collected: 03/16/25 1545     Updated: 03/16/25 1557    Narrative:      CT ABDOMEN PELVIS WO CONTRAST    Date of Exam: 3/16/2025 3:16 PM EDT    Indication: Flank pain, kidney stone suspected  Abdominal pain  flank pain.    Comparison: CT abdomen pelvis 1/25/2025. Chest CT 1/24/2025.    Technique: Axial CT images were obtained of the abdomen and pelvis without the administration of contrast. Reconstructed coronal and sagittal images were also obtained. Automated exposure control and iterative construction methods were used.      Findings:  Included Chest: Bibasal atelectasis. Bronchiectasis in the left lower lobe. Mildly decreased tree-in-bud nodular opacities in the left lower lobe since 1/24/2025, though these are partially imaged and would recommend referring to prior chest CT report   for further recommendations. Trace pericardial effusion.    Liver: No significant abnormality.     Gallbladder and biliary tree: Cholecystectomy     Spleen: No significant abnormality.     Pancreas: No significant abnormality.     Adrenal glands: No significant abnormality.     Kidneys and ureters: No significant abnormality.     Stomach and duodenum:No significant abnormality.    Small and large bowel: Status post diverting colostomy. Mild fat stranding at the colostomy, likely postsurgical change. Colonic diverticulosis. No evidence of bowel obstruction. Normal-appearing appendix.    Peritoneal cavity: No free fluid or free air.    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     Vasculature: Atherosclerotic calcifications.     Lymph nodes: No pathologic appearing lymph nodes by imaging criteria.     Bones and soft tissues: Degenerative changes of the imaged spine. No acute osseous abnormality. Left proximal femoral fixation hardware. Interval umbilical hernia repair with mild amount of underlying fat stranding/inflammatory change, favored   postsurgical. No organized fluid collection in the region of the previously seen perianal abscess.      Impression:      Impression:  No acute findings in the abdomen/pelvis.    Status post diverting colostomy. Mild fat stranding at the colostomy, likely postsurgical change. No evidence of bowel obstruction.    Interval umbilical hernia repair with mild amount of underlying fat stranding/inflammatory change, favored postsurgical.    Mildly decreased tree-in-bud nodular opacities in the left lower lobe since 1/24/2025, though these are partially imaged and would recommend referring to prior chest CT report for further recommendations.        Electronically Signed: Farshad Soriano    3/16/2025 3:55 PM EDT    Workstation ID: [id number]    XR Chest 1 View [[id number]] Collected: 03/16/25 1528     Updated: 03/16/25 1534    Narrative:      XR CHEST 1 VW    Date of Exam: 3/16/2025 2:50 PM EDT    Indication: SOA Triage Protocol    Comparison: February 5, 2025    Findings:  The patient is somewhat rotated. A right subclavian port has its tip at the mid SVC. A left subclavian catheter has its tip at the upper SVC. There are coarse bilateral interstitial markings. There is a small right pleural effusion. The heart and   mediastinal contours appear stable. There are degenerative changes along the right shoulder.      Impression:      Impression:  1.Coarse bilateral interstitial markings, which could reflect interstitial pulmonary edema or atypical pneumonia.  2.Small right pleural effusion.        Electronically Signed: Fransico Truong MD    3/16/2025  3:32 PM EDT    Workstation ID: [id number]          Orders (last 24 hrs)        Start     Ordered    03/16/25 1717  Inpatient Admission  Once         03/16/25 1718    03/16/25 1717  Code Status and Medical Interventions: CPR (Attempt to Resuscitate); Full Support  Continuous         03/16/25 1718    03/16/25 1700  pantoprazole (PROTONIX) injection 40 mg  Once         03/16/25 1637    03/16/25 1639  Inpatient Hospitalist Consult  Once        Specialty:  Hospitalist  Provider:  Lino Leal Jr., MD    03/16/25 1638    03/16/25 1638  Gastroenterology (on-call MD unless specified)  Once        Specialty:  Gastroenterology  Provider:  Rafael Hernandez MD    03/16/25 1637    03/16/25 1612  Urinalysis, Microscopic Only - Indwelling Urethral Catheter  Once         03/16/25 1611    03/16/25 1543  High Sensitivity Troponin T 1Hr  PROCEDURE ONCE         03/16/25 1531    03/16/25 1500  ondansetron (ZOFRAN) injection 4 mg  Once         03/16/25 1443    03/16/25 1500  sodium chloride 0.9 % bolus 1,000 mL  Once         03/16/25 1444    03/16/25 1444  CT Abdomen Pelvis Without Contrast  1 Time Imaging         03/16/25 1443    03/16/25 1438  NPO Diet NPO Type: Strict NPO  Diet Effective Now,   Status:  Canceled         03/16/25 1437    03/16/25 1438  Undress & Gown  Once         03/16/25 1437    03/16/25 1438  Cardiac Monitoring  Continuous        Comments: Follow Standing Orders As Outlined in Process Instructions (Open Order Report to View Full Instructions)    03/16/25 1437    03/16/25 1438  Continuous Pulse Oximetry  Continuous         03/16/25 1437    03/16/25 1438  Vital Signs  Per Hospital Policy/Protocol         03/16/25 1437    03/16/25 1438  ECG 12 Lead ED Triage Standing Order; SOA  Once         03/16/25 1437    03/16/25 1438  XR Chest 1 View  1 Time Imaging         03/16/25 1437    03/16/25 1438  Insert Peripheral IV  Once         03/16/25 1437    03/16/25 1438  [facility] Draw  Once         03/16/25 1437    03/16/25  1438  CBC & Differential  Once         03/16/25 1437    03/16/25 1438  Comprehensive Metabolic Panel  Once         03/16/25 1437    03/16/25 1438  BNP  Once         03/16/25 1437    03/16/25 1438  High Sensitivity Troponin T  Once         03/16/25 1437    03/16/25 1438  NPO Diet NPO Type: Strict NPO  Diet Effective Now         03/16/25 1437    03/16/25 1438  Undress & Gown  Once         03/16/25 1437    03/16/25 1438  Insert Peripheral IV  Once         03/16/25 1437    03/16/25 1438  [facility] Draw  Once,   Status:  Canceled         03/16/25 1437    03/16/25 1438  Lipase  Once         03/16/25 1437    03/16/25 1438  Urinalysis With Microscopic If Indicated (No Culture) - Indwelling Urethral Catheter  Once         03/16/25 1437    03/16/25 1438  Lactic Acid, Plasma  Once         03/16/25 1437    03/16/25 1438  Green Top (Gel)  PROCEDURE ONCE         03/16/25 1437    03/16/25 1438  Lavender Top  PROCEDURE ONCE         03/16/25 1437    03/16/25 1438  Gold Top - SST  PROCEDURE ONCE         03/16/25 1437    03/16/25 1438  Light Blue Top  PROCEDURE ONCE         03/16/25 1437    03/16/25 1438  CBC Auto Differential  PROCEDURE ONCE         03/16/25 1437    03/16/25 1437  sodium chloride 0.9 % flush 10 mL  As Needed         03/16/25 1437    03/16/25 1437  sodium chloride 0.9 % flush 10 mL  As Needed         03/16/25 1437    Unscheduled  Oxygen Therapy- Nasal Cannula; Titrate 1-6 LPM Per SpO2; 90 - 95%  Continuous PRN       03/16/25 1437    Signed and Held  Vital Signs  Every 4 Hours       Signed and Held    Signed and Held  Intake & Output  Every Shift       Signed and Held    Signed and Held  Weigh Patient  Once         Signed and Held    Signed and Held  Oral Care  2 Times Daily       Signed and Held    Signed and Held  Insert Peripheral IV  Once         Signed and Held    Signed and Held  Saline Lock & Maintain IV Access  Continuous         Signed and Held    Signed and Held  sodium chloride 0.9 % flush 10 mL  Every 12  Hours Scheduled         Signed and Held    Signed and Held  sodium chloride 0.9 % flush 10 mL  As Needed         Signed and Held    Signed and Held  sodium chloride 0.9 % infusion 40 mL  As Needed         Signed and Held    Signed and Held  pantoprazole (PROTONIX) injection 40 mg  Every 12 Hours Scheduled         Signed and Held    Signed and Held  Place Sequential Compression Device  Once         Signed and Held    Signed and Held  Maintain Sequential Compression Device  Continuous         Signed and Held    Signed and Held  Continuous Cardiac Monitoring  Continuous        Comments: Follow Standing Orders As Outlined in Process Instructions (Open Order Report to View Full Instructions)    Signed and Held    Signed and Held  nitroglycerin (NITROSTAT) SL tablet 0.4 mg  Every 5 Minutes PRN         Signed and Held    Signed and Held  Maintain IV Access  Continuous         Signed and Held    Signed and Held  Telemetry - Place Orders & Notify Provider of Results When Patient Experiences Acute Chest Pain, Dysrhythmia or Respiratory Distress  Continuous        Comments: Open Order Report to View Parameters Requiring Provider Notification    Signed and Held    Signed and Held  Up With Assistance  As Needed         Signed and Held    Signed and Held  Notify Provider (With Default Parameters)  Continuous        Comments: Open Order Report to View Parameters Requiring Provider Notification    Signed and Held    Signed and Held  NPO Diet NPO Type: Strict NPO  Diet Effective Midnight         Signed and Held    Signed and Held  Diet: Liquid; Clear Liquid; Fluid Consistency: Thin (IDDSI 0)  Diet Effective Now         Signed and Held    Signed and Held  Inpatient Gastroenterology Consult  Once        Specialty:  Gastroenterology  Provider:  Rafael Hernandez MD    Signed and Held    Signed and Held  Hemoglobin & Hematocrit, Blood  Every 8 Hours       Signed and Held    Signed and Held  Comprehensive Metabolic Panel  Morning Draw  "        Signed and Held    Signed and Held  acetaminophen (TYLENOL) tablet 650 mg  Every 4 Hours PRN        Placed in \"Or\" Linked Group    Signed and Held    Signed and Held  acetaminophen (TYLENOL) 160 MG/5ML oral solution 650 mg  Every 4 Hours PRN        Placed in \"Or\" Linked Group    Signed and Held    Signed and Held  acetaminophen (TYLENOL) suppository 650 mg  Every 4 Hours PRN        Placed in \"Or\" Linked Group    Signed and Held    Signed and Held  sennosides-docusate (PERICOLACE) 8.6-50 MG per tablet 2 tablet  2 Times Daily PRN        Placed in \"And\" Linked Group    Signed and Held    Signed and Held  polyethylene glycol (MIRALAX) packet 17 g  Daily PRN        Placed in \"And\" Linked Group    Signed and Held    Signed and Held  bisacodyl (DULCOLAX) EC tablet 5 mg  Daily PRN        Placed in \"And\" Linked Group    Signed and Held    Signed and Held  bisacodyl (DULCOLAX) suppository 10 mg  Daily PRN        Placed in \"And\" Linked Group    Signed and Held    Signed and Held  ondansetron (ZOFRAN) injection 4 mg  Every 6 Hours PRN         Signed and Held    Signed and Held  Type & Screen  Once         Signed and Held    Signed and Held  Protime-INR  STAT         Signed and Held    Signed and Held  aPTT  STAT         Signed and Held    Signed and Held  Bladder Scan  As Needed       Signed and Held    Signed and Held  Follow Hypoglycemia Standing Orders For Blood Glucose <70 & Notify Provider of Treatment  As Needed        Comments: Follow Hypoglycemia Orders As Outlined in Process Instructions (Open Order Report to View Full Instructions)  Notify Provider Any Time Hypoglycemia Treatment is Administered    Signed and Held    Signed and Held  dextrose (GLUTOSE) oral gel 15 g  Every 15 Minutes PRN         Signed and Held    Signed and Held  dextrose (D50W) (25 g/50 mL) IV injection 25 g  Every 15 Minutes PRN         Signed and Held    Signed and Held  glucagon (GLUCAGEN) injection 1 mg  Every 15 Minutes PRN         " Signed and Held    Signed and Held  POC Glucose 4x Daily Before Meals & at Bedtime  4 Times Daily Before Meals & at Bedtime      Comments: Complete no more than 45 minutes prior to patient eating      Signed and Held    Signed and Held  Insulin Lispro (humaLOG) injection 2-9 Units  4 Times Daily Before Meals & Nightly         Signed and Held    Signed and Held  arformoterol (BROVANA) nebulizer solution 15 mcg  2 Times Daily - RT         Signed and Held    Signed and Held  budesonide (PULMICORT) nebulizer solution 0.5 mg  2 Times Daily         Signed and Held    Signed and Held  ipratropium-albuterol (DUO-NEB) nebulizer solution 3 mL  Every 4 Hours PRN         Signed and Held

## 2025-03-16 NOTE — H&P
[facility] HISTORY AND PHYSICAL  Date: 3/16/2025   Patient Name: Preston Wallis  : 1965  MRN: [id number]  Primary Care Physician:  Kimmy Riley MD  Date of admission: 3/16/2025    Subjective   Subjective     Chief Complaint: Coffee-ground emesis    HPI:    Preston Wallis is a 59 y.o. male past medical history of CKD stage III, diabetes mellitus, COPD that presented to the emergency department for evaluation of coffee-ground emesis.  Patient has been residing in a nursing facility since previous admission discharged on .  States that since yesterday a.m. he has had intermittent episodes of coffee-ground emesis.  Denies any other fevers, chills, sweats, chest pain, shortness of breath, palpitations, abdominal pain, diarrhea constipation, dysuria, new weakness or rash.  In the emergency department hemoglobin found to be stable from previous discharge.  GI was notified patient started on Protonix and will be admitted for ongoing monitoring and management.    Of note, patient with previous prolonged hospitalization discharged  as above.  During that hospital stay treated for MDRO Pseudomonas pneumonia with IV meropenem and tobramycin nebs.  To continue on 30 days on and 30 days off tobramycin nebs.  Completed 30 days on while in house and scheduled for outpatient follow-up with pulmonology for ongoing management.  Patient was also treated for perianal abscess with Enterococcus VRE and MDR Citrobacter which was treated with Zyvox and Zosyn per DC summary.  Subsequently underwent diverting loop colostomy due to poor wound healing.      Personal History     Past Medical History:  Past Medical History:   Diagnosis Date    1. Incision and drainage of posterior perianal abscess 2. Sharp excisional debridement through skin and subcutaneous tissue in the posterior perianal area, 9 x 6 x 1 cm 2025    Age-related cognitive decline     Allergic contact dermatitis      "Allergies     Anemia     Bedbound     11/2023 \"MY LEG MUSCLES STOPPED WORKING\"    Bronchiectasis with acute lower respiratory infection     Charcot foot due to diabetes mellitus 09/10/2013    Chronic diastolic (congestive) heart failure     Chronic kidney disease     Chronic respiratory failure with hypoxia     Closed supracondylar fracture of femur 01/12/2022    COPD (chronic obstructive pulmonary disease)     Deep vein thrombosis (DVT) of lower extremity associated with air travel 01/13/2023    Dependence on supplemental oxygen     Eczema     Erectile dysfunction     due to organic reasons    Essential (primary) hypertension     Fracture     closed fracture of other tarsal and metatarsal bones    Fracture of proximal humerus 01/13/2023    GERD without esophagitis     High risk medication use     Hypercholesteremia     Hypomagnesemia     Infected stasis ulcer of left lower extremity 01/13/2023    Insomnia     Low back pain     Major depressive disorder     Morbid (severe) obesity due to excess calories     MRSA pneumonia     Muscle weakness     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 without evidence of necrosis     Obstructive sleep apnea (adult) (pediatric)     On home O2     REPORTS WEARING 2L/NC AAT    Other forms of dyspnea     Other long term (current) drug therapy     Other specified noninfective gastroenteritis and colitis     Other spondylosis, lumbar region     Pain in both knees     Paroxysmal atrial fibrillation     Peripheral neuropathy     attributed to type 2 diabetes    Pneumonia, unspecified organism     Polyneuropathy     Rash and other nonspecific skin eruption     Self-catheterizes urinary bladder     EVERY 4 HOURS    Smoking     \"SOMETIMES\"    Syncope and collapse     Tachycardia     Tinnitus 01/13/2023    Type 1 diabetes mellitus with diabetic chronic kidney disease     Type 2 diabetes mellitus     Unspecified fall, initial encounter     Urinary " retention          Past Surgical History:  Past Surgical History:   Procedure Laterality Date    BRONCHOSCOPY N/A 1/28/2025    Procedure: BRONCHOSCOPY: BAL: insertion of lighted instrument to view inside the lung;  Surgeon: Walter Nicole DO;  Location: [facility] MAIN OR;  Service: Pulmonary;  Laterality: N/A;    BRONCHOSCOPY N/A 2/3/2025    Procedure: BRONCHOSCOPY WITH BRONCHOALVEOLAR LAVAGE, POSSIBLE BIOPSY, BRUSHING, WASHING, AIRWAY INSPECTION: insertion of lighted instrument to view inside the lung;  Surgeon: Chadd Swan MD;  Location: [facility] MAIN OR;  Service: Pulmonary;  Laterality: N/A;    CARDIAC CATHETERIZATION Left 8/15/2024    Procedure: Carbon dioxide aortogram with left leg angiogram, possible angioplasty or stenting;  Surgeon: Moshe Willson MD;  Location: [facility] CATH INVASIVE LOCATION;  Service: Vascular;  Laterality: Left;    CHOLECYSTECTOMY      COLOSTOMY N/A 2/6/2025    Procedure: COLOSTOMY LAPAROSCOPIC; plain text: creation of colostomy using small incisions;  Surgeon: Emerson Canada MD;  Location: [facility] OR [facility];  Service: General;  Laterality: N/A;    CYSTOSCOPY      FEMUR SURGERY Left     Shravan placed    INCISION AND DRAINAGE ABSCESS N/A 1/26/2025    Procedure: INCISION AND DRAINAGE ABSCESS; plain text: incision and drain pus from buttocks wound;  Surgeon: Emerson Canada MD;  Location: [facility] OR [facility];  Service: General;  Laterality: N/A;    KNEE SURGERY Left     OTHER SURGICAL HISTORY Left     venous port, REMOVED    PORTACATH PLACEMENT Right     TIBIAL PLATEAU OPEN REDUCTION INTERNAL FIXATION Left 12/22/2023    Procedure: TIBIAL PLATEAU OPEN REDUCTION INTERNAL FIXATION;  Surgeon: Hugo Kline MD;  Location: [facility] OR;  Service: Orthopedics;  Laterality: Left;    TONSILLECTOMY AND ADENOIDECTOMY           Family History:   Family History   Problem Relation Age of Onset    Coronary artery disease Mother     Hypertension Mother     Diabetes type II Mother     Asthma  Father     Diabetes type II Sister     Cancer Sister     Malig Hyperthermia Neg Hx          Social History:   Social History     Tobacco Use    Smoking status: Former     Current packs/day: 0.00     Average packs/day: 1 pack/day for 12.0 years (12.0 ttl pk-yrs)     Types: Cigarettes     Start date:      Quit date:      Years since quittin.2     Passive exposure: Past    Smokeless tobacco: Never   Vaping Use    Vaping status: Never Used   Substance Use Topics    Alcohol use: Not Currently    Drug use: Never           Home Medications:  FreeStyle [place] 3 Plus Sensor, FreeStyle [place] 3 [place], GNP One Daily Plus Iron, Insulin Lispro (1 Unit Dial), Magnesium Oxide -Mg Supplement, O2, Semaglutide (1 MG/DOSE), Vitamin D3, amoxicillin-clavulanate, apixaban, arformoterol, aspirin, atorvastatin, budesonide, busPIRone, calcium citrate, collagenase, dapagliflozin, dilTIAZem CD, docusate sodium, famotidine, ferrous gluconate, ferrous sulfate, finasteride, folic acid, gabapentin, hydrALAZINE, insulin detemir, ipratropium-albuterol, lisinopril, metFORMIN ER, metoprolol tartrate, montelukast, tamsulosin, tiotropium, tobramycin PF, and vitamin C    Allergies:  Allergies   Allergen Reactions    Benadryl [Diphenhydramine] Itching    Proventil [Albuterol] Other (See Comments)     Mouth sores         Review of Systems   All systems were reviewed and negative except for: Coffee-ground emesis    Objective   Objective     Vitals:   Temp:  [97.9 °F (36.6 °C)] 97.9 °F (36.6 °C)  Heart Rate:  [103-114] 103  Resp:  [16] 16  BP: (163-187)/(91-94) 181/91  Flow (L/min) (Oxygen Therapy):  [2] 2    Physical Exam    Constitutional: Awake, alert, no acute distress   Eyes: Pupils equal, sclerae anicteric, no conjunctival injection   HENT: NCAT, mucous membranes moist   Neck: Supple, no thyromegaly, no lymphadenopathy, trachea midline   Respiratory: Diminished bilaterally but nonlabored   Cardiovascular: RRR, no murmurs, rubs, or gallops,  palpable pedal pulses bilaterally   Gastrointestinal: Positive bowel sounds, soft, nontender, nondistended, colostomy noted   Musculoskeletal: No bilateral ankle edema, no clubbing or cyanosis to extremities   Psychiatric: Appropriate affect, cooperative   Neurologic: Oriented x 3, strength symmetric in all extremities, Cranial Nerves grossly intact to confrontation, speech clear   Skin: No rashes     Result Review    Result Review:  I have personally reviewed the results from the time of this admission to 3/16/2025 17:18 EDT and agree with these findings:  [x]  Laboratory  []  Microbiology  [x]  Radiology  []  EKG/Telemetry   []  Cardiology/Vascular   []  Pathology  []  Old records  []  Other:      Assessment & Plan   Assessment / Plan     Assessment/Plan:   Hematemesis: GI called from the ER, appreciate recommendations.  Will give clear liquid diet for now and n.p.o. at midnight.  Continue on his IV twice daily.  Check type and screen and coags.  Monitor serial H&H and transfuse for active signs of bleeding or hemoglobin less than 7.  Monitor on telemetry as well.  CKD stage III: Appears at baseline.  Renally dose medications and avoid nephrotoxins.  Monitor serial labs.  History of COPD without exacerbation: Will continue home regimen  Paroxysmal atrial fibrillation: Holding Eliquis due to suspected hematemesis.  Monitor on telemetry and resume other home medications once verified  Insulin-dependent diabetes mellitus: Insulin sliding scale, monitor requirements and adjust as needed  Recent MDRO Pseudomonas pneumonia: Per discharge summary patient is to be on a course of tobramycin neb treatment 30 days on and 30 days off.  Did reportedly complete 30 days on during previous hospitalization with outpatient pulmonology follow-up for ongoing management.  Urinary retention: Intermittently self caths at home.  Bladder scan as needed      VTE Prophylaxis:  Mechanical VTE prophylaxis orders are signed & held.           CODE STATUS:    Code Status (Patient has no pulse and is not breathing): CPR (Attempt to Resuscitate)  Medical Interventions (Patient has pulse or is breathing): Full Support      Admission Status:  I believe this patient meets inpatient status.    Electronically signed by Lino Leal Jr, MD, 03/16/25, 5:18 PM EDT.

## 2025-03-17 LAB
ALBUMIN SERPL-MCNC: 2.6 G/DL (ref 3.5–5.2)
ALBUMIN/GLOB SERPL: 0.6 G/DL
ALP SERPL-CCNC: 167 U/L (ref 39–117)
ALT SERPL W P-5'-P-CCNC: 29 U/L (ref 1–41)
ANION GAP SERPL CALCULATED.3IONS-SCNC: 11.3 MMOL/L (ref 5–15)
AST SERPL-CCNC: 35 U/L (ref 1–40)
BILIRUB SERPL-MCNC: 0.2 MG/DL (ref 0–1.2)
BUN SERPL-MCNC: 20 MG/DL (ref 6–20)
BUN/CREAT SERPL: 10.5 (ref 7–25)
CALCIUM SPEC-SCNC: 8.4 MG/DL (ref 8.6–10.5)
CHLORIDE SERPL-SCNC: 102 MMOL/L (ref 98–107)
CO2 SERPL-SCNC: 27.7 MMOL/L (ref 22–29)
CREAT SERPL-MCNC: 1.9 MG/DL (ref 0.76–1.27)
EGFRCR SERPLBLD CKD-EPI 2021: 40.1 ML/MIN/1.73
GLOBULIN UR ELPH-MCNC: 4.1 GM/DL
GLUCOSE BLDC GLUCOMTR-MCNC: 116 MG/DL (ref 70–99)
GLUCOSE BLDC GLUCOMTR-MCNC: 117 MG/DL (ref 70–99)
GLUCOSE BLDC GLUCOMTR-MCNC: 162 MG/DL (ref 70–99)
GLUCOSE BLDC GLUCOMTR-MCNC: 99 MG/DL (ref 70–99)
GLUCOSE SERPL-MCNC: 105 MG/DL (ref 65–99)
HCT VFR BLD AUTO: 27 % (ref 37.5–51)
HCT VFR BLD AUTO: 27.2 % (ref 37.5–51)
HCT VFR BLD AUTO: 30.4 % (ref 37.5–51)
HGB BLD-MCNC: 8.2 G/DL (ref 13–17.7)
HGB BLD-MCNC: 8.3 G/DL (ref 13–17.7)
HGB BLD-MCNC: 9.4 G/DL (ref 13–17.7)
MYCOBACTERIUM SPEC CULT: NORMAL
NIGHT BLUE STAIN TISS: NORMAL
NIGHT BLUE STAIN TISS: NORMAL
POTASSIUM SERPL-SCNC: 3.6 MMOL/L (ref 3.5–5.2)
PROT SERPL-MCNC: 6.7 G/DL (ref 6–8.5)
SODIUM SERPL-SCNC: 141 MMOL/L (ref 136–145)

## 2025-03-17 PROCEDURE — 94760 N-INVAS EAR/PLS OXIMETRY 1: CPT

## 2025-03-17 PROCEDURE — 94664 DEMO&/EVAL PT USE INHALER: CPT

## 2025-03-17 PROCEDURE — 63710000001 INSULIN LISPRO (HUMAN) PER 5 UNITS: Performed by: INTERNAL MEDICINE

## 2025-03-17 PROCEDURE — 85018 HEMOGLOBIN: CPT | Performed by: INTERNAL MEDICINE

## 2025-03-17 PROCEDURE — 85014 HEMATOCRIT: CPT | Performed by: INTERNAL MEDICINE

## 2025-03-17 PROCEDURE — 87481 CANDIDA DNA AMP PROBE: CPT | Performed by: INTERNAL MEDICINE

## 2025-03-17 PROCEDURE — 82948 REAGENT STRIP/BLOOD GLUCOSE: CPT

## 2025-03-17 PROCEDURE — 94799 UNLISTED PULMONARY SVC/PX: CPT

## 2025-03-17 PROCEDURE — 99252 IP/OBS CONSLTJ NEW/EST SF 35: CPT | Performed by: INTERNAL MEDICINE

## 2025-03-17 PROCEDURE — 99233 SBSQ HOSP IP/OBS HIGH 50: CPT | Performed by: INTERNAL MEDICINE

## 2025-03-17 PROCEDURE — 96376 TX/PRO/DX INJ SAME DRUG ADON: CPT

## 2025-03-17 PROCEDURE — 80053 COMPREHEN METABOLIC PANEL: CPT | Performed by: INTERNAL MEDICINE

## 2025-03-17 RX ORDER — SODIUM HYPOCHLORITE 0.057 %
1 SOLUTION, IRRIGATION IRRIGATION DAILY
COMMUNITY

## 2025-03-17 RX ORDER — ATORVASTATIN CALCIUM 20 MG/1
20 TABLET, FILM COATED ORAL NIGHTLY
Status: DISCONTINUED | OUTPATIENT
Start: 2025-03-17 | End: 2025-03-20 | Stop reason: HOSPADM

## 2025-03-17 RX ORDER — ZINC SULFATE 50(220)MG
220 CAPSULE ORAL DAILY
COMMUNITY
Start: 2025-03-14 | End: 2025-03-28

## 2025-03-17 RX ORDER — FINASTERIDE 5 MG/1
5 TABLET, FILM COATED ORAL NIGHTLY
Status: DISCONTINUED | OUTPATIENT
Start: 2025-03-17 | End: 2025-03-20 | Stop reason: HOSPADM

## 2025-03-17 RX ORDER — MONTELUKAST SODIUM 10 MG/1
10 TABLET ORAL NIGHTLY
Status: DISCONTINUED | OUTPATIENT
Start: 2025-03-17 | End: 2025-03-20 | Stop reason: HOSPADM

## 2025-03-17 RX ORDER — SODIUM HYPOCHLORITE 0.057 %
1 SOLUTION, IRRIGATION IRRIGATION AS NEEDED
COMMUNITY

## 2025-03-17 RX ORDER — POVIDONE-IODINE 0.5 MG/ML
1 SPRAY TOPICAL 2 TIMES DAILY
COMMUNITY

## 2025-03-17 RX ORDER — DILTIAZEM HYDROCHLORIDE 240 MG/1
240 CAPSULE, COATED, EXTENDED RELEASE ORAL DAILY
Status: DISCONTINUED | OUTPATIENT
Start: 2025-03-17 | End: 2025-03-18

## 2025-03-17 RX ORDER — TAMSULOSIN HYDROCHLORIDE 0.4 MG/1
0.4 CAPSULE ORAL DAILY
Status: DISCONTINUED | OUTPATIENT
Start: 2025-03-17 | End: 2025-03-20 | Stop reason: HOSPADM

## 2025-03-17 RX ORDER — MINERAL OIL/HYDROPHIL PETROLAT
1 OINTMENT (GRAM) TOPICAL
Status: DISCONTINUED | OUTPATIENT
Start: 2025-03-17 | End: 2025-03-20 | Stop reason: HOSPADM

## 2025-03-17 RX ADMIN — BUDESONIDE 0.5 MG: 0.5 INHALANT RESPIRATORY (INHALATION) at 09:22

## 2025-03-17 RX ADMIN — BUDESONIDE 0.5 MG: 0.5 INHALANT RESPIRATORY (INHALATION) at 19:58

## 2025-03-17 RX ADMIN — Medication 10 ML: at 05:28

## 2025-03-17 RX ADMIN — PANTOPRAZOLE SODIUM 40 MG: 40 INJECTION, POWDER, FOR SOLUTION INTRAVENOUS at 05:28

## 2025-03-17 RX ADMIN — ATORVASTATIN CALCIUM 20 MG: 20 TABLET, FILM COATED ORAL at 21:38

## 2025-03-17 RX ADMIN — INSULIN LISPRO 2 UNITS: 100 INJECTION, SOLUTION INTRAVENOUS; SUBCUTANEOUS at 21:25

## 2025-03-17 RX ADMIN — ARFORMOTEROL TARTRATE 15 MCG: 15 SOLUTION RESPIRATORY (INHALATION) at 09:22

## 2025-03-17 RX ADMIN — PANTOPRAZOLE SODIUM 40 MG: 40 INJECTION, POWDER, FOR SOLUTION INTRAVENOUS at 21:25

## 2025-03-17 RX ADMIN — Medication 10 ML: at 10:50

## 2025-03-17 RX ADMIN — BUSPIRONE HYDROCHLORIDE 15 MG: 10 TABLET ORAL at 21:25

## 2025-03-17 RX ADMIN — Medication 473 ML: at 21:26

## 2025-03-17 RX ADMIN — Medication 10 ML: at 21:26

## 2025-03-17 RX ADMIN — WHITE PETROLATUM 1 APPLICATION: 1.75 OINTMENT TOPICAL at 21:26

## 2025-03-17 RX ADMIN — FINASTERIDE 5 MG: 5 TABLET, FILM COATED ORAL at 21:25

## 2025-03-17 RX ADMIN — MONTELUKAST 10 MG: 10 TABLET, FILM COATED ORAL at 21:25

## 2025-03-17 RX ADMIN — ARFORMOTEROL TARTRATE 15 MCG: 15 SOLUTION RESPIRATORY (INHALATION) at 19:59

## 2025-03-17 NOTE — PLAN OF CARE
Goal Outcome Evaluation:              Outcome Evaluation: Patient alert and oriented throughout shift. Pt on 2L NC with no signs of distress. Wound care completed, wound care to see patient today. Isolation precautions maintained. Continue plan of care.

## 2025-03-17 NOTE — PROGRESS NOTES
[facility]   Hospitalist Progress Note  Date: 3/17/2025  Patient Name: Preston Wallis  : 1965  MRN: [id number]  Date of admission: 3/16/2025      Subjective   Subjective     Chief Complaint: Follow up for coffee ground emesis    Summary: 60 y/o M with COPD on 2 L oxygen, MILADY, hypertension, atrial fibrillation on Eliquis, diabetes, Pseudomonas pneumonia who presented from nursing facility with coffee-ground emesis.  Tachycardic and hypertensive.  Lactate 1.2.  Hemoglobin 9.2 which is near baseline.  GI consulted.  On IV PPI.    Interval Followup:   No acute events overnight  No episodes of vomiting since admission  No epigastric pain  No overt bleeding    Objective   Objective     Vitals:   Temp:  [97.9 °F (36.6 °C)-99.1 °F (37.3 °C)] 98.2 °F (36.8 °C)  Heart Rate:  [] 90  Resp:  [16-18] 18  BP: (147-187)/(62-94) 151/65  Flow (L/min) (Oxygen Therapy):  [2] 2  Physical Exam    Constitutional: conversant, NAD   Respiratory:  nonlabored respirations    Cardiovascular:  RRR, no edema   Gastrointestinal: soft, nondistended, LLQ colostomy   Neurologic: Alert, speech clear   Skin: Extremities warm.  Chronic gluteal wound, POA, wound images reviewed    Result Review    Result Review:  I have personally reviewed the following over the last 24 hours (07:00 to 07:00) and agree with the following findings  [x]  Laboratory  CBC          2025    04:48 3/16/2025    14:43 3/17/2025    00:03   CBC   WBC 12.45  11.72     RBC 2.89  3.35     Hemoglobin 8.2  9.2  8.3    Hematocrit 26.0  29.9  27.2    MCV 90.0  89.3     MCH 28.4  27.5     MCHC 31.5  30.8     RDW 15.5  15.2     Platelets 726  516       CMP          2025    04:48 3/16/2025    14:43 3/17/2025    02:13   CMP   Glucose 161  124  105    BUN 81  20  20    Creatinine 2.59  2.09  1.90    EGFR 27.7  35.8  40.1    Sodium 135  140  141    Potassium 3.7  3.8  3.6    Chloride 100  101  102    Calcium 9.7  9.2  8.4    Total Protein  7.3  6.7    Albumin  2.9   2.6    Globulin  4.4  4.1    Total Bilirubin  0.2  0.2    Alkaline Phosphatase  196  167    AST (SGOT)  34  35    ALT (SGPT)  30  29    Albumin/Globulin Ratio  0.7  0.6    BUN/Creatinine Ratio 31.3  9.6  10.5    Anion Gap 8.8  10.1  11.3      []  Microbiology  [x]  Radiology   CT Abdomen Pelvis Without Contrast  Result Date: 3/16/2025  CT ABDOMEN PELVIS WO CONTRAST Date of Exam: 3/16/2025 3:16 PM EDT Indication: Flank pain, kidney stone suspected Abdominal pain flank pain. Comparison: CT abdomen pelvis 1/25/2025. Chest CT 1/24/2025. Technique: Axial CT images were obtained of the abdomen and pelvis without the administration of contrast. Reconstructed coronal and sagittal images were also obtained. Automated exposure control and iterative construction methods were used. Findings: Included Chest: Bibasal atelectasis. Bronchiectasis in the left lower lobe. Mildly decreased tree-in-bud nodular opacities in the left lower lobe since 1/24/2025, though these are partially imaged and would recommend referring to prior chest CT report for further recommendations. Trace pericardial effusion. Liver: No significant abnormality.  Gallbladder and biliary tree: Cholecystectomy  Spleen: No significant abnormality.  Pancreas: No significant abnormality.  Adrenal glands: No significant abnormality.  Kidneys and ureters: No significant abnormality.  Stomach and duodenum:No significant abnormality. Small and large bowel: Status post diverting colostomy. Mild fat stranding at the colostomy, likely postsurgical change. Colonic diverticulosis. No evidence of bowel obstruction. Normal-appearing appendix. Peritoneal cavity: No free fluid or free air. Bladder: Doyle in decompressed bladder  Pelvic organs: No significant abnormality.  Vasculature: Atherosclerotic calcifications.  Lymph nodes: No pathologic appearing lymph nodes by imaging criteria.  Bones and soft tissues: Degenerative changes of the imaged spine. No acute osseous  abnormality. Left proximal femoral fixation hardware. Interval umbilical hernia repair with mild amount of underlying fat stranding/inflammatory change, favored postsurgical. No organized fluid collection in the region of the previously seen perianal abscess.     Impression: No acute findings in the abdomen/pelvis. Status post diverting colostomy. Mild fat stranding at the colostomy, likely postsurgical change. No evidence of bowel obstruction. Interval umbilical hernia repair with mild amount of underlying fat stranding/inflammatory change, favored postsurgical. Mildly decreased tree-in-bud nodular opacities in the left lower lobe since 1/24/2025, though these are partially imaged and would recommend referring to prior chest CT report for further recommendations. Electronically Signed: Farshad Soriano  3/16/2025 3:55 PM EDT  Workstation ID: [id number]    XR Chest 1 View  Result Date: 3/16/2025  XR CHEST 1 VW Date of Exam: 3/16/2025 2:50 PM EDT Indication: SOA Triage Protocol Comparison: February 5, 2025 Findings: The patient is somewhat rotated. A right subclavian port has its tip at the mid SVC. A left subclavian catheter has its tip at the upper SVC. There are coarse bilateral interstitial markings. There is a small right pleural effusion. The heart and mediastinal contours appear stable. There are degenerative changes along the right shoulder.     Impression: 1.Coarse bilateral interstitial markings, which could reflect interstitial pulmonary edema or atypical pneumonia. 2.Small right pleural effusion. Electronically Signed: Fransico Truong MD  3/16/2025 3:32 PM EDT  Workstation ID: [id number]      [x]  EKG/Telemetry monitor personally reviewed and independently interpreted: NSR  []  Cardiology/Vascular   []  Pathology  []  Old records  [x]  Other:    Intake/Output Summary (Last 24 hours) at 3/17/2025 1020  Last data filed at 3/17/2025 0500  Gross per 24 hour   Intake 500 ml   Output 700 ml   Net -200 ml          Assessment & Plan   Assessment / Plan     Assessment/Plan:  Coffee ground emesis  Chronic anemia  Essential HTN  Afib, no longer on Eliquis  Type 2 diabetes  COPD on chronic oxygen 2 L/min  History of MDR Pseudomonas pneumonia  History of perianal abscess status post diverting colostomy  Thrombocytosis  CKD stage IIIb  BPH, chronic intermittent catheterization use outpatient   Chronic anxiety       No episodes of vomiting or overt bleeding since admission  CT abdomen pelvis no contrast no acute intra-abdominal finding reported  Hemoglobin 8.2, down from 9.2 yesterday.  Platelets 516.  INR 1.27  Eliquis and baby aspirin on hold  Continue IV PPI twice daily  H&H every 12 hours, transfuse for hemoglobin less than 7  GI consulted.  Keep n.p.o. pending evaluation  SBP >160; restart diltiazem  mg daily otherwise hold antihypertensive agents  Kidney function at baseline.  Nonoliguric.  Avoid nephrotoxic agent.  Trend renal function and electrolytes  Continue scheduled nebulizers, continue 2 L/min nasal cannula which is baseline  Continue finasteride and tamsulosin  Continue home BuSpar  Wound care RN consult  Maintain SCDs  CBC, BMP in AM    Discussed plan with RN.    VTE Prophylaxis:  Mechanical VTE prophylaxis orders are present.      CODE STATUS:   Code Status (Patient has no pulse and is not breathing): CPR (Attempt to Resuscitate)  Medical Interventions (Patient has pulse or is breathing): Full Support    I spent greater than 50 minutes minutes of time for evaluation and management of this patient including review of chart, labs pertinent imaging available as well as medical decision making and discussion with physicians, staff and patient.       Electronically signed by Leonard Multani DO, 03/17/25, 10:22 AM EDT.

## 2025-03-17 NOTE — CONSULTS
"Chief Complaint  Vomiting, Nausea, and Abdominal Pain    History of Present Illness  Preston Wallis is a 59 y.o. male with past medical history positive for diabetes, atrial fibrillation, neuropathy, high cholesterol, high blood pressure, morbid obesity, COPD, GERD who presents to [facility] FLOOR [facility] on referral from No ref. provider found for a gastroenterology evaluation of nausea and vomiting patient has episodes of hematemesis there was half a cup full of coffee-ground material and each time he vomited.  Patient denies any abdominal pain.  There is no history of rectal bleeding or melena.  His hemoglobin stable from previous admission        Labs Result Review Imaging    Past Medical History:   Diagnosis Date    1. Incision and drainage of posterior perianal abscess 2. Sharp excisional debridement through skin and subcutaneous tissue in the posterior perianal area, 9 x 6 x 1 cm 01/26/2025    Age-related cognitive decline     Allergic contact dermatitis     Allergies     Anemia     Bedbound     11/2023 \"MY LEG MUSCLES STOPPED WORKING\"    Bronchiectasis with acute lower respiratory infection     Charcot foot due to diabetes mellitus 09/10/2013    Chronic diastolic (congestive) heart failure     Chronic kidney disease     Chronic respiratory failure with hypoxia     Closed supracondylar fracture of femur 01/12/2022    COPD (chronic obstructive pulmonary disease)     Deep vein thrombosis (DVT) of lower extremity associated with air travel 01/13/2023    Dependence on supplemental oxygen     Eczema     Erectile dysfunction     due to organic reasons    Essential (primary) hypertension     Fracture     closed fracture of other tarsal and metatarsal bones    Fracture of proximal humerus 01/13/2023    GERD without esophagitis     High risk medication use     Hypercholesteremia     Hypomagnesemia     Infected stasis ulcer of left lower extremity 01/13/2023    Insomnia     Low back pain     " "Major depressive disorder     Morbid (severe) obesity due to excess calories     MRSA pneumonia     Muscle weakness     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 without evidence of necrosis     Obstructive sleep apnea (adult) (pediatric)     On home O2     REPORTS WEARING 2L/NC AAT    Other forms of dyspnea     Other long term (current) drug therapy     Other specified noninfective gastroenteritis and colitis     Other spondylosis, lumbar region     Pain in both knees     Paroxysmal atrial fibrillation     Peripheral neuropathy     attributed to type 2 diabetes    Pneumonia, unspecified organism     Polyneuropathy     Rash and other nonspecific skin eruption     Self-catheterizes urinary bladder     EVERY 4 HOURS    Smoking     \"SOMETIMES\"    Syncope and collapse     Tachycardia     Tinnitus 01/13/2023    Type 1 diabetes mellitus with diabetic chronic kidney disease     Type 2 diabetes mellitus     Unspecified fall, initial encounter     Urinary retention        Past Surgical History:   Procedure Laterality Date    BRONCHOSCOPY N/A 1/28/2025    Procedure: BRONCHOSCOPY: BAL: insertion of lighted instrument to view inside the lung;  Surgeon: Walter Nicole DO;  Location: [facility] MAIN OR;  Service: Pulmonary;  Laterality: N/A;    BRONCHOSCOPY N/A 2/3/2025    Procedure: BRONCHOSCOPY WITH BRONCHOALVEOLAR LAVAGE, POSSIBLE BIOPSY, BRUSHING, WASHING, AIRWAY INSPECTION: insertion of lighted instrument to view inside the lung;  Surgeon: Chadd Sawn MD;  Location: [facility] MAIN OR;  Service: Pulmonary;  Laterality: N/A;    CARDIAC CATHETERIZATION Left 8/15/2024    Procedure: Carbon dioxide aortogram with left leg angiogram, possible angioplasty or stenting;  Surgeon: Moshe Willson MD;  Location: [facility] CATH INVASIVE LOCATION;  Service: Vascular;  Laterality: Left;    CHOLECYSTECTOMY      COLOSTOMY N/A 2/6/2025    Procedure: COLOSTOMY LAPAROSCOPIC; plain text: creation " of colostomy using small incisions;  Surgeon: Emerson Canada MD;  Location: [facility] OR [facility];  Service: General;  Laterality: N/A;    CYSTOSCOPY      FEMUR SURGERY Left     Shravan placed    INCISION AND DRAINAGE ABSCESS N/A 1/26/2025    Procedure: INCISION AND DRAINAGE ABSCESS; plain text: incision and drain pus from buttocks wound;  Surgeon: Emerson Canada MD;  Location: [facility] OR [facility];  Service: General;  Laterality: N/A;    KNEE SURGERY Left     OTHER SURGICAL HISTORY Left     venous port, REMOVED    PORTACATH PLACEMENT Right     TIBIAL PLATEAU OPEN REDUCTION INTERNAL FIXATION Left 12/22/2023    Procedure: TIBIAL PLATEAU OPEN REDUCTION INTERNAL FIXATION;  Surgeon: Hugo Kline MD;  Location: [facility] MAIN OR;  Service: Orthopedics;  Laterality: Left;    TONSILLECTOMY AND ADENOIDECTOMY           Current Facility-Administered Medications:     acetaminophen (TYLENOL) tablet 650 mg, 650 mg, Oral, Q4H PRN **OR** acetaminophen (TYLENOL) 160 MG/5ML oral solution 650 mg, 650 mg, Oral, Q4H PRN **OR** acetaminophen (TYLENOL) suppository 650 mg, 650 mg, Rectal, Q4H PRN, Lino Leal Jr., MD    arformoterol (BROVANA) nebulizer solution 15 mcg, 15 mcg, Nebulization, BID - RT, Lino Leal Jr., MD, 15 mcg at 03/17/25 0922    atorvastatin (LIPITOR) tablet 20 mg, 20 mg, Oral, Nightly, Leonard Multani, DO    sennosides-docusate (PERICOLACE) 8.6-50 MG per tablet 2 tablet, 2 tablet, Oral, BID PRN **AND** polyethylene glycol (MIRALAX) packet 17 g, 17 g, Oral, Daily PRN **AND** bisacodyl (DULCOLAX) EC tablet 5 mg, 5 mg, Oral, Daily PRN **AND** bisacodyl (DULCOLAX) suppository 10 mg, 10 mg, Rectal, Daily PRN, Lino Leal Jr., MD    budesonide (PULMICORT) nebulizer solution 0.5 mg, 0.5 mg, Nebulization, BID, Lino Leal Jr., MD, 0.5 mg at 03/17/25 0922    busPIRone (BUSPAR) tablet 15 mg, 15 mg, Oral, BID, Leonard Multani DO    dextrose (D50W) (25 g/50 mL) IV injection 25 g, 25 g, Intravenous, Q15 Min PRN, Lino Leal  JASON Mtz MD    dextrose (GLUTOSE) oral gel 15 g, 15 g, Oral, Q15 Min PRN, Lino Leal Jr., MD    [Held by provider] dilTIAZem CD (CARDIZEM CD) 24 hr capsule 240 mg, 240 mg, Oral, Daily, Leonard Multani DO    finasteride (PROSCAR) tablet 5 mg, 5 mg, Oral, Nightly, Leonard Multani DO    glucagon (GLUCAGEN) injection 1 mg, 1 mg, Intramuscular, Q15 Min PRN, Lino Leal Jr., MD    Insulin Lispro (humaLOG) injection 2-9 Units, 2-9 Units, Subcutaneous, 4x Daily AC & at Bedtime, Lino Leal Jr., MD    ipratropium-albuterol (DUO-NEB) nebulizer solution 3 mL, 3 mL, Nebulization, Q4H PRN, Lino Leal Jr., MD, 3 mL at 03/16/25 2003    montelukast (SINGULAIR) tablet 10 mg, 10 mg, Oral, Nightly, Leonard Multani DO    ondansetron (ZOFRAN) injection 4 mg, 4 mg, Intravenous, Q6H PRN, Lino Leal Jr., MD    pantoprazole (PROTONIX) injection 40 mg, 40 mg, Intravenous, Q12H, Lino Leal Jr., MD, 40 mg at 03/17/25 0528    sodium chloride 0.9 % flush 10 mL, 10 mL, Intravenous, PRN, Claudia Chinchilla MD    sodium chloride 0.9 % flush 10 mL, 10 mL, Intravenous, PRN, Claudia Chinchilla MD    sodium chloride 0.9 % flush 10 mL, 10 mL, Intravenous, Q12H, Lino Leal Jr., MD, 10 mL at 03/17/25 1050    sodium chloride 0.9 % flush 10 mL, 10 mL, Intravenous, PRNMel Joseph R Jr., MD    sodium chloride 0.9 % infusion 40 mL, 40 mL, Intravenous, PRN, Lino Leal Jr., MD    tamsulosin (FLOMAX) 24 hr capsule 0.4 mg, 0.4 mg, Oral, Daily, Leonard Multani DO     Allergies   Allergen Reactions    Benadryl [Diphenhydramine] Itching    Proventil [Albuterol] Other (See Comments)     Mouth sores         Family History   Problem Relation Age of Onset    Coronary artery disease Mother     Hypertension Mother     Diabetes type II Mother     Asthma Father     Diabetes type II Sister     Cancer Sister     Malig Hyperthermia Neg Hx         Social History     Social History Narrative    Not on file   Social history positive for smoking  "quit in 1993 no alcohol or drug    Immunization:  Immunization History   Administered Date(s) Administered    COVID-19 (PFIZER) 12YRS+ (COMIRNATY) 10/22/2024    Flu Vaccine Quad PF >36MO 10/18/2016, 10/16/2017, 11/04/2019    Flu Vaccine Split Quad 11/04/2019    Fluzone  >6mos 09/19/2013    Fluzone (or Fluarix & Flulaval for VFC) >6mos 09/19/2013, 10/18/2016, 10/16/2017, 11/04/2019, 10/19/2022, 11/14/2023    Fluzone High-Dose 65+YRS 10/22/2024    Influenza Injectable Mdck Pf Quad 10/19/2022    Influenza, Unspecified 10/18/2016, 10/16/2017, 11/04/2019, 09/21/2020    PEDS-Pneumococcal Conjugate (PCV7) 11/14/2023    Pneumococcal Conjugate 20-Valent (PCV20) 11/14/2023    Pneumococcal Polysaccharide (PPSV23) 11/20/1997    Shingrix 07/11/2024, 10/22/2024    Tdap 09/18/2018    influenza Split 11/04/2019        Objective     Review of Systems 10 system review is negative except as mentioned HPI    Vital Signs:   /74 (BP Location: Right arm, Patient Position: Lying)   Pulse 92   Temp 97.9 °F (36.6 °C) (Oral)   Resp 18   Ht 175.3 cm (69\")   Wt 104 kg (228 lb 13.4 oz)   SpO2 98%   BMI 33.79 kg/m²       Physical Exam  Constitutional:       General: He is awake. He is not in acute distress.     Appearance: Normal appearance. He is well-developed and well-groomed.   HENT:      Head: Normocephalic and atraumatic.      Mouth/Throat:      Mouth: Mucous membranes are moist.      Comments: Rip dental hygiene is good  Eyes:      General: Lids are normal.      Conjunctiva/sclera: Conjunctivae normal.      Pupils: Pupils are equal, round, and reactive to light.   Neck:      Thyroid: No thyroid mass.      Trachea: Trachea normal.   Cardiovascular:      Rate and Rhythm: Normal rate and regular rhythm.      Heart sounds: Normal heart sounds.   Pulmonary:      Effort: Pulmonary effort is normal.      Breath sounds: Normal breath sounds and air entry.   Abdominal:      General: Abdomen is flat. Bowel sounds are normal. There is no " distension.      Palpations: Abdomen is soft. There is no mass.      Tenderness: There is no abdominal tenderness. There is no guarding.   Musculoskeletal:      Cervical back: Neck supple.      Right lower leg: No edema.      Left lower leg: No edema.   Skin:     General: Skin is warm and moist.      Coloration: Skin is not cyanotic, jaundiced or pale.      Findings: No rash.      Nails: There is no clubbing.   Neurological:      Mental Status: He is alert and oriented to person, place, and time.   Psychiatric:         Attention and Perception: Attention normal.         Mood and Affect: Mood and affect normal.         Speech: Speech normal.         Labs:  Results from last 7 days   Lab Units 03/17/25  0748 03/17/25  0003 03/16/25  1443   WBC 10*3/mm3  --   --  11.72*   HEMOGLOBIN g/dL 8.2* 8.3* 9.2*   HEMATOCRIT % 27.0* 27.2* 29.9*   PLATELETS 10*3/mm3  --   --  516*      Results from last 7 days   Lab Units 03/17/25  0213 03/16/25  1443   SODIUM mmol/L 141 140   POTASSIUM mmol/L 3.6 3.8   CHLORIDE mmol/L 102 101   CO2 mmol/L 27.7 28.9   BUN mg/dL 20 20   CREATININE mg/dL 1.90* 2.09*   CALCIUM mg/dL 8.4* 9.2   BILIRUBIN mg/dL 0.2 0.2   ALK PHOS U/L 167* 196*   ALT (SGPT) U/L 29 30   AST (SGOT) U/L 35 34   GLUCOSE mg/dL 105* 124*      Results from last 7 days   Lab Units 03/16/25  1443   INR  1.27*        Assessment & Plan:  Principal Problem:    GI bleed  Hematemesis with nausea  Anemia which is probably acute on chronic anemia    Plan do upper endoscopy in the morning risk and benefits have been discussed with patient.    Patient was given instructions and counseling regarding his condition or for health maintenance advice. Please see specific information pulled into the AVS if appropriate.        Signed:  Karri Hernandez MD  03/17/25  17:16 EDT

## 2025-03-17 NOTE — SIGNIFICANT NOTE
perirectal wound with black foam frays noted embedded in wound bed. superficial frays were removed   Assisted wound nurse who had already removed some frays with NS moist gauze, attempting to remove remaining frays. Removed multiple frays but some are covered with a layer of tissue and unable to be removed without additional trauma, will attempt to use moist dressings to see if can loosen from base and remove at next wound nurse visit     Other wound nurse did let providers know about issues with frays embedded into wound base.

## 2025-03-17 NOTE — PLAN OF CARE
Problem: Adult Inpatient Plan of Care  Goal: Plan of Care Review  Outcome: Not Progressing  Flowsheets (Taken 3/17/2025 1704)  Outcome Evaluation:   Patient alert and oriented throughout shift. On 2 liters NC with no signs of distress. Encourage turn. Patient plan of care discussed at bedside   family stated that patient has been immobile for two years now. PT/OT consulted. Patient is from nursing home. RLQ colostomy. Wound care nurse completed patient wound care at bedside on shift. Patient now on a clear liquid diet. Will continue with patient plan of care.  Plan of Care Reviewed With: patient   Goal Outcome Evaluation:  Plan of Care Reviewed With: patient           Outcome Evaluation: Patient alert and oriented throughout shift. On 2 liters NC with no signs of distress. Encourage turn. Patient plan of care discussed at bedside; family stated that patient has been immobile for two years now. PT/OT consulted. Patient is from nursing home. RLQ colostomy. Wound care nurse completed patient wound care at bedside on shift. Patient now on a clear liquid diet. Will continue with patient plan of care.

## 2025-03-18 ENCOUNTER — ANESTHESIA (OUTPATIENT)
Dept: GASTROENTEROLOGY | Facility: HOSPITAL | Age: 60
End: 2025-03-18
Payer: COMMERCIAL

## 2025-03-18 ENCOUNTER — ANESTHESIA EVENT (OUTPATIENT)
Dept: GASTROENTEROLOGY | Facility: HOSPITAL | Age: 60
End: 2025-03-18
Payer: COMMERCIAL

## 2025-03-18 LAB
ANION GAP SERPL CALCULATED.3IONS-SCNC: 8 MMOL/L (ref 5–15)
BUN SERPL-MCNC: 19 MG/DL (ref 6–20)
BUN/CREAT SERPL: 9.5 (ref 7–25)
C AURIS DNA SPEC QL NAA+NON-PROBE: NOT DETECTED
CALCIUM SPEC-SCNC: 8.7 MG/DL (ref 8.6–10.5)
CHLORIDE SERPL-SCNC: 101 MMOL/L (ref 98–107)
CO2 SERPL-SCNC: 29 MMOL/L (ref 22–29)
CREAT SERPL-MCNC: 2.01 MG/DL (ref 0.76–1.27)
DEPRECATED RDW RBC AUTO: 48.9 FL (ref 37–54)
EGFRCR SERPLBLD CKD-EPI 2021: 37.5 ML/MIN/1.73
ERYTHROCYTE [DISTWIDTH] IN BLOOD BY AUTOMATED COUNT: 14.9 % (ref 12.3–15.4)
GLUCOSE BLDC GLUCOMTR-MCNC: 112 MG/DL (ref 70–99)
GLUCOSE BLDC GLUCOMTR-MCNC: 124 MG/DL (ref 70–99)
GLUCOSE BLDC GLUCOMTR-MCNC: 157 MG/DL (ref 70–99)
GLUCOSE BLDC GLUCOMTR-MCNC: 189 MG/DL (ref 70–99)
GLUCOSE SERPL-MCNC: 136 MG/DL (ref 65–99)
HCT VFR BLD AUTO: 27.6 % (ref 37.5–51)
HGB BLD-MCNC: 8.7 G/DL (ref 13–17.7)
MCH RBC QN AUTO: 28.2 PG (ref 26.6–33)
MCHC RBC AUTO-ENTMCNC: 31.5 G/DL (ref 31.5–35.7)
MCV RBC AUTO: 89.6 FL (ref 79–97)
PLATELET # BLD AUTO: 437 10*3/MM3 (ref 140–450)
PMV BLD AUTO: 8.1 FL (ref 6–12)
POTASSIUM SERPL-SCNC: 3.5 MMOL/L (ref 3.5–5.2)
RBC # BLD AUTO: 3.08 10*6/MM3 (ref 4.14–5.8)
SODIUM SERPL-SCNC: 138 MMOL/L (ref 136–145)
WBC NRBC COR # BLD AUTO: 10.77 10*3/MM3 (ref 3.4–10.8)

## 2025-03-18 PROCEDURE — 88305 TISSUE EXAM BY PATHOLOGIST: CPT | Performed by: INTERNAL MEDICINE

## 2025-03-18 PROCEDURE — 82948 REAGENT STRIP/BLOOD GLUCOSE: CPT

## 2025-03-18 PROCEDURE — 25810000003 LACTATED RINGERS PER 1000 ML

## 2025-03-18 PROCEDURE — 97161 PT EVAL LOW COMPLEX 20 MIN: CPT

## 2025-03-18 PROCEDURE — 25010000002 PROPOFOL 10 MG/ML EMULSION

## 2025-03-18 PROCEDURE — 63710000001 INSULIN LISPRO (HUMAN) PER 5 UNITS: Performed by: INTERNAL MEDICINE

## 2025-03-18 PROCEDURE — 99233 SBSQ HOSP IP/OBS HIGH 50: CPT | Performed by: INTERNAL MEDICINE

## 2025-03-18 PROCEDURE — 0DB38ZX EXCISION OF LOWER ESOPHAGUS, VIA NATURAL OR ARTIFICIAL OPENING ENDOSCOPIC, DIAGNOSTIC: ICD-10-PCS | Performed by: INTERNAL MEDICINE

## 2025-03-18 PROCEDURE — 80048 BASIC METABOLIC PNL TOTAL CA: CPT | Performed by: INTERNAL MEDICINE

## 2025-03-18 PROCEDURE — 85027 COMPLETE CBC AUTOMATED: CPT | Performed by: INTERNAL MEDICINE

## 2025-03-18 PROCEDURE — 97166 OT EVAL MOD COMPLEX 45 MIN: CPT

## 2025-03-18 PROCEDURE — 94799 UNLISTED PULMONARY SVC/PX: CPT

## 2025-03-18 PROCEDURE — 25010000002 LIDOCAINE PF 2% 2 % SOLUTION

## 2025-03-18 PROCEDURE — 43239 EGD BIOPSY SINGLE/MULTIPLE: CPT | Performed by: INTERNAL MEDICINE

## 2025-03-18 PROCEDURE — 94664 DEMO&/EVAL PT USE INHALER: CPT

## 2025-03-18 RX ORDER — DILTIAZEM HYDROCHLORIDE 240 MG/1
240 CAPSULE, COATED, EXTENDED RELEASE ORAL
Status: DISCONTINUED | OUTPATIENT
Start: 2025-03-18 | End: 2025-03-20 | Stop reason: HOSPADM

## 2025-03-18 RX ORDER — FERROUS SULFATE 325(65) MG
325 TABLET ORAL
Status: DISCONTINUED | OUTPATIENT
Start: 2025-03-19 | End: 2025-03-20 | Stop reason: HOSPADM

## 2025-03-18 RX ORDER — LIDOCAINE HYDROCHLORIDE 20 MG/ML
INJECTION, SOLUTION EPIDURAL; INFILTRATION; INTRACAUDAL; PERINEURAL AS NEEDED
Status: DISCONTINUED | OUTPATIENT
Start: 2025-03-18 | End: 2025-03-18 | Stop reason: SURG

## 2025-03-18 RX ORDER — PROPOFOL 10 MG/ML
VIAL (ML) INTRAVENOUS AS NEEDED
Status: DISCONTINUED | OUTPATIENT
Start: 2025-03-18 | End: 2025-03-18 | Stop reason: SURG

## 2025-03-18 RX ORDER — SODIUM CHLORIDE, SODIUM LACTATE, POTASSIUM CHLORIDE, CALCIUM CHLORIDE 600; 310; 30; 20 MG/100ML; MG/100ML; MG/100ML; MG/100ML
INJECTION, SOLUTION INTRAVENOUS CONTINUOUS PRN
Status: DISCONTINUED | OUTPATIENT
Start: 2025-03-18 | End: 2025-03-18 | Stop reason: SURG

## 2025-03-18 RX ORDER — SODIUM CHLORIDE 9 MG/ML
9 INJECTION, SOLUTION INTRAVENOUS CONTINUOUS
Status: CANCELLED | OUTPATIENT
Start: 2025-03-18 | End: 2025-03-18

## 2025-03-18 RX ADMIN — Medication 473 ML: at 22:00

## 2025-03-18 RX ADMIN — PANTOPRAZOLE SODIUM 40 MG: 40 INJECTION, POWDER, FOR SOLUTION INTRAVENOUS at 21:58

## 2025-03-18 RX ADMIN — PROPOFOL 50 MG: 10 INJECTION, EMULSION INTRAVENOUS at 16:53

## 2025-03-18 RX ADMIN — ATORVASTATIN CALCIUM 20 MG: 20 TABLET, FILM COATED ORAL at 21:59

## 2025-03-18 RX ADMIN — INSULIN LISPRO 2 UNITS: 100 INJECTION, SOLUTION INTRAVENOUS; SUBCUTANEOUS at 21:59

## 2025-03-18 RX ADMIN — MONTELUKAST 10 MG: 10 TABLET, FILM COATED ORAL at 21:59

## 2025-03-18 RX ADMIN — Medication 10 ML: at 15:25

## 2025-03-18 RX ADMIN — PROPOFOL 150 MCG/KG/MIN: 10 INJECTION, EMULSION INTRAVENOUS at 16:53

## 2025-03-18 RX ADMIN — SODIUM CHLORIDE, POTASSIUM CHLORIDE, SODIUM LACTATE AND CALCIUM CHLORIDE: 600; 310; 30; 20 INJECTION, SOLUTION INTRAVENOUS at 16:53

## 2025-03-18 RX ADMIN — Medication 10 ML: at 21:59

## 2025-03-18 RX ADMIN — FINASTERIDE 5 MG: 5 TABLET, FILM COATED ORAL at 21:58

## 2025-03-18 RX ADMIN — ARFORMOTEROL TARTRATE 15 MCG: 15 SOLUTION RESPIRATORY (INHALATION) at 09:24

## 2025-03-18 RX ADMIN — BUDESONIDE 0.5 MG: 0.5 INHALANT RESPIRATORY (INHALATION) at 09:24

## 2025-03-18 RX ADMIN — BUDESONIDE 0.5 MG: 0.5 INHALANT RESPIRATORY (INHALATION) at 19:56

## 2025-03-18 RX ADMIN — WHITE PETROLATUM 1 APPLICATION: 1.75 OINTMENT TOPICAL at 12:29

## 2025-03-18 RX ADMIN — ARFORMOTEROL TARTRATE 15 MCG: 15 SOLUTION RESPIRATORY (INHALATION) at 19:56

## 2025-03-18 RX ADMIN — LIDOCAINE HYDROCHLORIDE 100 MG: 20 INJECTION, SOLUTION INTRAVENOUS at 16:53

## 2025-03-18 RX ADMIN — BUSPIRONE HYDROCHLORIDE 15 MG: 10 TABLET ORAL at 21:59

## 2025-03-18 RX ADMIN — COLLAGENASE SANTYL 1 APPLICATION: 250 OINTMENT TOPICAL at 12:28

## 2025-03-18 RX ADMIN — WHITE PETROLATUM 1 APPLICATION: 1.75 OINTMENT TOPICAL at 21:59

## 2025-03-18 RX ADMIN — Medication 473 ML: at 12:30

## 2025-03-18 NOTE — THERAPY EVALUATION
Patient Name: Preston Wallis  : 1965    MRN: [id number]                              Today's Date: 3/18/2025       Admit Date: 3/16/2025    Visit Dx:     ICD-10-CM ICD-9-CM   1. Gastrointestinal hemorrhage, unspecified gastrointestinal hemorrhage type  K92.2 578.9   2. Decreased activities of daily living (ADL)  Z78.9 V49.89   3. Difficulty walking  R26.2 719.7     Patient Active Problem List   Diagnosis    Chronic cough    Pneumonia due to COVID-19 virus    Polyneuropathy    Paroxysmal atrial fibrillation    Obstructive sleep apnea    MRSA pneumonia    Low back pain    Chronic diastolic (congestive) heart failure    Allergies    COPD exacerbation    Chronic anticoagulation    Benign prostatic hyperplasia    Difficulty using continuous positive airway pressure (CPAP) device    Stage 3a chronic kidney disease    Iron deficiency anemia secondary to inadequate dietary iron intake    Vitamin D deficiency    Class 3 severe obesity with serious comorbidity in adult    Lower extremity edema    Elevated alkaline phosphatase level    Venous insufficiency (chronic) (peripheral)    Tobacco abuse, in remission    History of Pseudomonas pneumonia    Chronic dyspnea    Gastroesophageal reflux disease    Bronchiectasis without complication    ERIC (acute kidney injury)    Altered mental status    History of anemia due to chronic kidney disease    Hyperlipidemia    Luetscher's syndrome    Neurogenic bladder    Class 1 obesity    Pneumonia due to Pseudomonas species    Seizures    Primary osteoarthritis of left knee    Other constipation    Chronic obstructive pulmonary disease    Type 2 diabetes mellitus with hyperglycemia, with long-term current use of insulin    Essential hypertension    Stage 3b chronic kidney disease    Annual physical exam    Long-term use of high-risk medication    Personal history of PE (pulmonary embolism)    Encounter for aftercare for healing closed traumatic fracture of left femur    Chronic pain of  "left knee    Primary osteoarthritis of right knee    Sepsis    Bronchiectasis with acute exacerbation    Bacterial pneumonia    Anemia    Closed fracture of left tibial plateau    Septic joint    Septic arthritis    Skin ulcer of left heel, limited to breakdown of skin    Ulcer of left foot, limited to breakdown of skin    Left foot pain    Wheezing    Acute on chronic respiratory failure with hypercapnia    Chronic respiratory failure with hypoxia and hypercapnia    Acute hypoxic on chronic hypercapnic respiratory failure    Impaired cognition    Atherosclerosis of native arteries of the extremities with ulceration    PNA (pneumonia)    Perianal abscess    1. Incision and drainage of posterior perianal abscess 2. Sharp excisional debridement through skin and subcutaneous tissue in the posterior perianal area, 9 x 6 x 1 cm    Wound of gluteal cleft    Chronic kidney disease, stage IV (severe)    Colostomy status    GI bleed     Past Medical History:   Diagnosis Date    1. Incision and drainage of posterior perianal abscess 2. Sharp excisional debridement through skin and subcutaneous tissue in the posterior perianal area, 9 x 6 x 1 cm 01/26/2025    Age-related cognitive decline     Allergic contact dermatitis     Allergies     Anemia     Bedbound     11/2023 \"MY LEG MUSCLES STOPPED WORKING\"    Bronchiectasis with acute lower respiratory infection     Charcot foot due to diabetes mellitus 09/10/2013    Chronic diastolic (congestive) heart failure     Chronic kidney disease     Chronic respiratory failure with hypoxia     Closed supracondylar fracture of femur 01/12/2022    COPD (chronic obstructive pulmonary disease)     Deep vein thrombosis (DVT) of lower extremity associated with air travel 01/13/2023    Dependence on supplemental oxygen     Eczema     Erectile dysfunction     due to organic reasons    Essential (primary) hypertension     Fracture     closed fracture of other tarsal and metatarsal bones    Fracture " "of proximal humerus 01/13/2023    GERD without esophagitis     High risk medication use     Hypercholesteremia     Hypomagnesemia     Infected stasis ulcer of left lower extremity 01/13/2023    Insomnia     Low back pain     Major depressive disorder     Morbid (severe) obesity due to excess calories     MRSA pneumonia     Muscle weakness     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 without evidence of necrosis     Obstructive sleep apnea (adult) (pediatric)     On home O2     REPORTS WEARING 2L/NC AAT    Other forms of dyspnea     Other long term (current) drug therapy     Other specified noninfective gastroenteritis and colitis     Other spondylosis, lumbar region     Pain in both knees     Paroxysmal atrial fibrillation     Peripheral neuropathy     attributed to type 2 diabetes    Pneumonia, unspecified organism     Polyneuropathy     Rash and other nonspecific skin eruption     Self-catheterizes urinary bladder     EVERY 4 HOURS    Smoking     \"SOMETIMES\"    Syncope and collapse     Tachycardia     Tinnitus 01/13/2023    Type 1 diabetes mellitus with diabetic chronic kidney disease     Type 2 diabetes mellitus     Unspecified fall, initial encounter     Urinary retention      Past Surgical History:   Procedure Laterality Date    BRONCHOSCOPY N/A 1/28/2025    Procedure: BRONCHOSCOPY: BAL: insertion of lighted instrument to view inside the lung;  Surgeon: Walter Nicole DO;  Location: [facility] MAIN OR;  Service: Pulmonary;  Laterality: N/A;    BRONCHOSCOPY N/A 2/3/2025    Procedure: BRONCHOSCOPY WITH BRONCHOALVEOLAR LAVAGE, POSSIBLE BIOPSY, BRUSHING, WASHING, AIRWAY INSPECTION: insertion of lighted instrument to view inside the lung;  Surgeon: Chadd Swan MD;  Location: [facility] MAIN OR;  Service: Pulmonary;  Laterality: N/A;    CARDIAC CATHETERIZATION Left 8/15/2024    Procedure: Carbon dioxide aortogram with left leg angiogram, possible angioplasty or " stenting;  Surgeon: Moshe Willson MD;  Location: [facility] CATH INVASIVE LOCATION;  Service: Vascular;  Laterality: Left;    CHOLECYSTECTOMY      COLOSTOMY N/A 2/6/2025    Procedure: COLOSTOMY LAPAROSCOPIC; plain text: creation of colostomy using small incisions;  Surgeon: Emerson Canada MD;  [place] [facility] [place] [facility]  Service: General;  Laterality: N/A;    CYSTOSCOPY      FEMUR SURGERY Left     Shravan placed    INCISION AND DRAINAGE ABSCESS N/A 1/26/2025    Procedure: INCISION AND DRAINAGE ABSCESS; plain text: incision and drain pus from buttocks wound;  Surgeon: Emerson Canada MD;  [place] [facility] [place] [facility] [place]  Service: General;  Laterality: N/A;    KNEE SURGERY Left     OTHER SURGICAL HISTORY Left     venous port, REMOVED    PORTACATH PLACEMENT Right     TIBIAL PLATEAU OPEN REDUCTION INTERNAL FIXATION Left 12/22/2023    Procedure: TIBIAL PLATEAU OPEN REDUCTION INTERNAL FIXATION;  Surgeon: Hugo Kline MD;  Location: [facility] OR;  Service: Orthopedics;  Laterality: Left;    TONSILLECTOMY AND ADENOIDECTOMY        General Information       Row Name 03/18/25 1052          OT Time and Intention    Subjective Information complains of;weakness;pain  -EG     Document Type evaluation  -EG     Mode of Treatment individual therapy;occupational therapy  -EG       Row Name 03/18/25 1052          General Information    Patient Profile Reviewed --  recently has been a facility resident; Elvia lift used; TD for ADL's and care; W/C used at facility; Wound vac recently placed around buttocks region that was limiting OOB abilities  -EG     Prior Level of Function max assist:;dependent:;ADL's;transfer  -EG     Existing Precautions/Restrictions fall;oxygen therapy device and L/min  Multiple wounds; colostomy  -EG     Barriers to Rehab medically complex;previous functional deficit  -EG       Row Name 03/18/25 1052          Occupational Profile    Reason for Services/Referral (Occupational Profile) Patient is a 59-year-old  male admitted to [facility] on 3/16/2025.  OT was consulted due to GI bleed.  OT to assess for new onset of deficits and limitations in ADL/transfer performance.  No previous OT services for current condition.  -EG       Row Name 03/18/25 1052          Living Environment    Current Living Arrangements extended care facility;other (see comments)  Patient states he wants to discharge home and not back to LTC  -EG     People in Home facility resident  -EG       Row Name 03/18/25 1052          Home Main Entrance    Number of Stairs, Main Entrance none  -EG     Stair Railings, Main Entrance none  -EG       Row Name 03/18/25 1052          Safety Issues/Impairments Affecting Functional Mobility    Safety Issues Affecting Function (Mobility) insight into deficits/self-awareness;problem-solving  -EG     Impairments Affecting Function (Mobility) balance;endurance/activity tolerance;sensation/sensory awareness;strength;pain  -EG               User Key  (r) = Recorded By, (t) = Taken By, (c) = Cosigned By      Initials Name Provider Type    EG Jamila Domínguez, OT Occupational Therapist                     Mobility/ADL's       Row Name 03/18/25 1058          Bed Mobility    Bed Mobility supine-sit-supine  -EG     Supine-Sit-Supine [place] (Bed Mobility) maximum assist (25% patient effort);dependent (less than 25% patient effort)  -EG     Assistive Device (Bed Mobility) bed rails;head of bed elevated  -EG       Row Name 03/18/25 1058          Transfers    Comment, (Transfers) Unable to safely perform this date; Elvia lift most recently at facility  -EG       Row Name 03/18/25 1058          Functional Mobility    Functional Mobility- Comment Unable to safely perform at this time  -EG       Row Name 03/18/25 1058          Activities of Daily Living    BADL Assessment/Intervention bathing;upper body dressing;lower body dressing;grooming;feeding;toileting  -       Row Name 03/18/25 1058          Bathing  Assessment/Intervention    [place] Level (Bathing) bathing skills;upper body;lower body;maximum assist (25% patient effort)  -EG       [facility] Name 03/18/25 1058          Upper Body Dressing Assessment/Training    [place] Level (Upper Body Dressing) upper body dressing skills;minimum assist (75% patient effort);moderate assist (50% patient effort)  -EG       [facility] Name 03/18/25 1058          Lower Body Dressing Assessment/Training    [place] Level (Lower Body Dressing) lower body dressing skills;dependent (less than 25% patient effort)  -EG       [facility] Name 03/18/25 1058          Grooming Assessment/Training    [place] Level (Grooming) grooming skills;set up  -EG       [facility] Name 03/18/25 1058          Self-Feeding Assessment/Training    [place] Level (Feeding) feeding skills;set up  -EG       Row Name 03/18/25 1058          Toileting Assessment/Training    [place] Level (Toileting) toileting skills;dependent (less than 25% patient effort)  -EG     Comment, (Toileting) colostomy; reports being cathed frequently throughout the day  -EG               User Key  (r) = Recorded By, (t) = Taken By, (c) = Cosigned By      Initials Name Provider Type    EG Jamila Domínguez OT Occupational Therapist                   Obj/Interventions       [facility] Name 03/18/25 1059          Sensory Assessment (Somatosensory)    Sensory Assessment (Somatosensory) UE sensation intact  -EG       Row Name 03/18/25 1059          Vision Assessment/Intervention    Visual Impairment/Limitations WFL  -EG       Row Name 03/18/25 1059          Range of Motion Comprehensive    General Range of Motion bilateral upper extremity ROM WFL  -EG       Row Name 03/18/25 1059          Motor Skills    Motor Skills coordination;functional endurance  -EG     Coordination WFL  -EG     Functional Endurance Poor  -EG       Row Name 03/18/25 1059          Balance    Comment, Balance unable to truly assess  -EG               User Key  (r) = Recorded  By, (t) = Taken By, (c) = Cosigned By      Initials Name Provider Type    EG Jamila Domínguez, OT Occupational Therapist                   Goals/Plan       Row Name 03/18/25 1101          Bed Mobility Goal 1 (OT)    Activity/Assistive Device (Bed Mobility Goal 1, OT) bed mobility activities, all  -EG     [place] Level/Cues Needed (Bed Mobility Goal 1, OT) modified independence  -EG     Time Frame (Bed Mobility Goal 1, OT) long term goal (LTG);10 days  -EG       Row Name 03/18/25 1101          Transfer Goal 1 (OT)    Activity/Assistive Device (Transfer Goal 1, OT) transfers, all  -EG     [place] Level/Cues Needed (Transfer Goal 1, OT) moderate assist (50-74% patient effort)  -EG     Time Frame (Transfer Goal 1, OT) long term goal (LTG);10 days  -EG       Row Name 03/18/25 1101          Bathing Goal 1 (OT)    Activity/Device (Bathing Goal 1, OT) bathing skills, all  -EG     [place] Level/Cues Needed (Bathing Goal 1, OT) minimum assist (75% or more patient effort)  -EG     Time Frame (Bathing Goal 1, OT) long term goal (LTG);10 days  -EG       Row Name 03/18/25 1101          Dressing Goal 1 (OT)    Activity/Device (Dressing Goal 1, OT) dressing skills, all  -EG     [place]/Cues Needed (Dressing Goal 1, OT) moderate assist (50-74% patient effort)  -EG     Time Frame (Dressing Goal 1, OT) long term goal (LTG);10 days  -EG       Row Name 03/18/25 1101          Problem Specific Goal 1 (OT)    Problem Specific Goal 1 (OT) Patient will improve functional endurance for OOB activities to fair to promote improved functional task participation.  -EG     Time Frame (Problem Specific Goal 1, OT) long term goal (LTG);10 days  -EG       Row Name 03/18/25 1101          Therapy Assessment/Plan (OT)    Planned Therapy Interventions (OT) activity tolerance training;functional balance retraining;occupation/activity based interventions;adaptive equipment training;BADL retraining;neuromuscular control/coordination  retraining;patient/caregiver education/training;transfer/mobility retraining;strengthening exercise  -EG               User Key  (r) = Recorded By, (t) = Taken By, (c) = Cosigned By      Initials Name Provider Type    Jamila Burnett OT Occupational Therapist                   Clinical Impression       Row Name 03/18/25 1100          Plan of Care Review    Plan of Care Reviewed With patient  -EG     Progress no change  -EG     Outcome Evaluation Patient presents with limitations of impaired functional strength, balance, endurance and limited safety/insight into own deficits requiring need for skilled OT services to facilitate return to prior level of function with ADLs.  -EG       Row Name 03/18/25 1100          Therapy Assessment/Plan (OT)    Rehab Potential (OT) limited  -EG     Criteria for Skilled Therapeutic Interventions Met (OT) yes;meets criteria;skilled treatment is necessary  -EG     Therapy Frequency (OT) 5 times/wk  -EG       Row Name 03/18/25 1100          Therapy Plan Review/Discharge Plan (OT)    Equipment Needs Upon Discharge (OT) --  Continue to assess equipment needs as patient progresses  -EG     Anticipated Discharge Disposition (OT) sub acute care setting  back to [facility]  -EG       Row Name 03/18/25 1100          Positioning and Restraints    Pre-Treatment Position in bed  -EG     Post Treatment Position bed  -EG     In Bed call light within reach;encouraged to call for assist;exit alarm on;supine  -EG               User Key  (r) = Recorded By, (t) = Taken By, (c) = Cosigned By      Initials Name Provider Type    Jamila Burnett OT Occupational Therapist                   Outcome Measures       Row Name 03/18/25 1103          How much help from another is currently needed...    Putting on and taking off regular lower body clothing? 1  -EG     Bathing (including washing, rinsing, and drying) 2  -EG     Toileting (which includes using toilet bed pan or  urinal) 1  -EG     Putting on and taking off regular upper body clothing 3  -EG     Taking care of personal grooming (such as brushing teeth) 3  -EG     Eating meals 4  -EG     AM-PAC 6 Clicks Score (OT) 14  -EG       Row Name 03/18/25 1000          How much help from another person do you currently need...    Turning from your back to your side while in flat bed without using bedrails? 3  -DP     Moving from lying on back to sitting on the side of a flat bed without bedrails? 2  -DP     Moving to and from a bed to a chair (including a wheelchair)? 1  -DP     Standing up from a chair using your arms (e.g., wheelchair, bedside chair)? 1  -DP     Climbing 3-5 steps with a railing? 1  -DP     To walk in hospital room? 1  -DP     AM-PAC 6 Clicks Score (PT) 9  -DP     Highest Level of Mobility Goal 3 --> Sit at edge of bed  -DP       Row Name 03/18/25 1103 03/18/25 1000       Functional Assessment    Outcome Measure Options AM-PAC 6 Clicks Daily Activity (OT);Optimal Instrument  -EG AM-PAC 6 Clicks Basic Mobility (PT)  -DP      Row Name 03/18/25 1103          Optimal Instrument    Optimal Instrument Optimal - 3  -EG     Bending/Stooping 4  -EG     Standing 5  -EG     Reaching 2  -EG     From the list, choose the 3 activities you would most like to be able to do without any difficulty Standing;Reaching;Bending/stooping  -EG     Total Score Optimal - 3 11  -EG               User Key  (r) = Recorded By, (t) = Taken By, (c) = Cosigned By      Initials Name Provider Type    DP Gretchen Aly, PT Physical Therapist    Jamila Burnett OT Occupational Therapist                    Occupational Therapy Education       Title: PT OT SLP Therapies (Done)       Topic: Occupational Therapy (Done)       Point: ADL training (Done)       Learning Progress Summary            Patient Nisreen E, VU,NR by EG at 3/18/2025 1103    Comment: Education on OT services and benefits  Education on fall prevention and safety                       Point: Home exercise program (Done)       Learning Progress Summary            Patient Eager, E, VU,NR by EG at 3/18/2025 1103    Comment: Education on OT services and benefits  Education on fall prevention and safety                      Point: Precautions (Done)       Learning Progress Summary            Patient Eager, E, VU,NR by EG at 3/18/2025 1103    Comment: Education on OT services and benefits  Education on fall prevention and safety                      Point: Body mechanics (Done)       Learning Progress Summary            Patient Eager, E, VU,NR by EG at 3/18/2025 1103    Comment: Education on OT services and benefits  Education on fall prevention and safety                                      User Key       Initials Effective Dates Name Provider Type Discipline    EG 09/14/22 -  Jamila Domínguez, OT Occupational Therapist OT                  OT Recommendation and Plan  Planned Therapy Interventions (OT): activity tolerance training, functional balance retraining, occupation/activity based interventions, adaptive equipment training, BADL retraining, neuromuscular control/coordination retraining, patient/caregiver education/training, transfer/mobility retraining, strengthening exercise  Therapy Frequency (OT): 5 times/wk  Plan of Care Review  Plan of Care Reviewed With: patient  Progress: no change  Outcome Evaluation: Patient presents with limitations of impaired functional strength, balance, endurance and limited safety/insight into own deficits requiring need for skilled OT services to facilitate return to prior level of function with ADLs.     Time Calculation:   Evaluation Complexity (OT)  Review Occupational Profile/Medical/Therapy History Complexity: expanded/moderate complexity  Assessment, Occupational Performance/Identification of Deficit Complexity: 3-5 performance deficits  Clinical Decision Making Complexity (OT): detailed assessment/moderate complexity  Overall Complexity of Evaluation  (OT): moderate complexity     Time Calculation- OT       Row Name 03/18/25 1106             Time Calculation- OT    OT Received On 03/18/25  -EG      OT Goal Re-Cert Due Date 03/27/25  -EG         Untimed Charges    OT Eval/Re-eval Minutes 34  -EG         Total Minutes    Untimed Charges Total Minutes 34  -EG       Total Minutes 34  -EG                User Key  (r) = Recorded By, (t) = Taken By, (c) = Cosigned By      Initials Name Provider Type    EG Jamila Domínguez OT Occupational Therapist                  Therapy Charges for Today       Code Description Service Date Service Provider Modifiers Qty    [id number] HC OT EVAL MOD COMPLEXITY 3 3/18/2025 Jamila Domínguez OT GO 1                 Jamila Domínguez OT  3/18/2025

## 2025-03-18 NOTE — PLAN OF CARE
Problem: Adult Inpatient Plan of Care  Goal: Plan of Care Review  Outcome: Progressing  Flowsheets (Taken 3/18/2025 8231)  Outcome Evaluation:   Patient alert and oriented throughout shift. On 2 liters NC with no signs of distress. Patient plan of care discussed at bedside. Specialty air mattress. Encourage turn. RLQ colostomy. EGD completed on shift   pt tolerated well. clear liquid diet and advance as tolerated.  Wound care to heel and skin care completed at bedside. Will continue with patient plan of care.   Goal Outcome Evaluation:  Plan of Care Reviewed With: patient           Outcome Evaluation: Patient alert and oriented throughout shift. On 2 liters NC with no signs of distress. Patient plan of care discussed at bedside. Specialty air mattress. Encourage turn. RLQ colostomy. EGD completed on shift; pt tolerated well. clear liquid diet and advance as tolerated.  Wound care to heel and skin care completed at bedside. Will continue with patient plan of care.

## 2025-03-18 NOTE — PLAN OF CARE
Goal Outcome Evaluation:              Outcome Evaluation: Patient alert and oriented throughout shift. 2L NC, with no signs of distress. Wound care orders place, and wound care completed. Lopez still in place, and lopez care complete. RLQ Colostomy changed. Pt on clear liquid diet, Scope to be done later today. Continue plan of care.

## 2025-03-18 NOTE — SIGNIFICANT NOTE
" Wound Eval / Progress Noted    YAIMA Stockton     Patient Name: Preston Wallis  : 1965  MRN: [id number]  Today's Date: 3/17/2025                 Admit Date: 3/16/2025    Visit Dx:    ICD-10-CM ICD-9-CM   1. Gastrointestinal hemorrhage, unspecified gastrointestinal hemorrhage type  K92.2 578.9         GI bleed        Past Medical History:   Diagnosis Date    1. Incision and drainage of posterior perianal abscess 2. Sharp excisional debridement through skin and subcutaneous tissue in the posterior perianal area, 9 x 6 x 1 cm 2025    Age-related cognitive decline     Allergic contact dermatitis     Allergies     Anemia     Bedbound     2023 \"MY LEG MUSCLES STOPPED WORKING\"    Bronchiectasis with acute lower respiratory infection     Charcot foot due to diabetes mellitus 09/10/2013    Chronic diastolic (congestive) heart failure     Chronic kidney disease     Chronic respiratory failure with hypoxia     Closed supracondylar fracture of femur 2022    COPD (chronic obstructive pulmonary disease)     Deep vein thrombosis (DVT) of lower extremity associated with air travel 2023    Dependence on supplemental oxygen     Eczema     Erectile dysfunction     due to organic reasons    Essential (primary) hypertension     Fracture     closed fracture of other tarsal and metatarsal bones    Fracture of proximal humerus 2023    GERD without esophagitis     High risk medication use     Hypercholesteremia     Hypomagnesemia     Infected stasis ulcer of left lower extremity 2023    Insomnia     Low back pain     Major depressive disorder     Morbid (severe) obesity due to excess calories     MRSA pneumonia     Muscle weakness     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 without evidence of necrosis     Obstructive sleep apnea (adult) (pediatric)     On home O2     REPORTS WEARING 2L/NC AAT    Other forms of dyspnea     Other long term (current) drug therapy  " "   Other specified noninfective gastroenteritis and colitis     Other spondylosis, lumbar region     Pain in both knees     Paroxysmal atrial fibrillation     Peripheral neuropathy     attributed to type 2 diabetes    Pneumonia, unspecified organism     Polyneuropathy     Rash and other nonspecific skin eruption     Self-catheterizes urinary bladder     EVERY 4 HOURS    Smoking     \"SOMETIMES\"    Syncope and collapse     Tachycardia     Tinnitus 01/13/2023    Type 1 diabetes mellitus with diabetic chronic kidney disease     Type 2 diabetes mellitus     Unspecified fall, initial encounter     Urinary retention         Past Surgical History:   Procedure Laterality Date    BRONCHOSCOPY N/A 1/28/2025    Procedure: BRONCHOSCOPY: BAL: insertion of lighted instrument to view inside the lung;  Surgeon: Walter Nicloe DO;  Location: [facility] MAIN OR;  Service: Pulmonary;  Laterality: N/A;    BRONCHOSCOPY N/A 2/3/2025    Procedure: BRONCHOSCOPY WITH BRONCHOALVEOLAR LAVAGE, POSSIBLE BIOPSY, BRUSHING, WASHING, AIRWAY INSPECTION: insertion of lighted instrument to view inside the lung;  Surgeon: Chadd Swan MD;  Location: [facility] MAIN OR;  Service: Pulmonary;  Laterality: N/A;    CARDIAC CATHETERIZATION Left 8/15/2024    Procedure: Carbon dioxide aortogram with left leg angiogram, possible angioplasty or stenting;  Surgeon: Moshe Willson MD;  Location: [facility] CATH INVASIVE LOCATION;  Service: Vascular;  Laterality: Left;    CHOLECYSTECTOMY      COLOSTOMY N/A 2/6/2025    Procedure: COLOSTOMY LAPAROSCOPIC; plain text: creation of colostomy using small incisions;  Surgeon: Emerson Canada MD;  Location: [facility] OR [facility];  Service: General;  Laterality: N/A;    CYSTOSCOPY      FEMUR SURGERY Left     Shravan placed    INCISION AND DRAINAGE ABSCESS N/A 1/26/2025    Procedure: INCISION AND DRAINAGE ABSCESS; plain text: incision and drain pus from buttocks wound;  Surgeon: Emerson Canada MD;  Location: [facility] OR [facility];  " Service: General;  Laterality: N/A;    KNEE SURGERY Left     OTHER SURGICAL HISTORY Left     venous port, REMOVED    PORTACATH PLACEMENT Right     TIBIAL PLATEAU OPEN REDUCTION INTERNAL FIXATION Left 12/22/2023    Procedure: TIBIAL PLATEAU OPEN REDUCTION INTERNAL FIXATION;  Surgeon: Hugo Kline MD;  Location: [facility] OR;  Service: Orthopedics;  Laterality: Left;    TONSILLECTOMY AND ADENOIDECTOMY           Physical Assessment:   03/17/25 1540   Wound 01/23/25 2330 coccyx pressure injury   Placement Date/Time: 01/23/25 2330   Location: coccyx  Primary Wound Type: Pressure Injury  Type: pressure injury  Present on Original Admission: Yes   Wound Image     Pressure Injury Stage 3   Dressing Appearance dry;intact   Closure None   Base moist;red;maroon/purple;other (see comments);slough  (Frayed black foam fibers embedded into wound base within area of maroon coloration.  Coagulated red tissue noted to distal aspect.)   Periwound dry;pink   Periwound Temperature warm   Periwound Skin Turgor soft   Edges open   Wound Length (cm) 7 cm   Wound Width (cm) 3.9 cm   Wound Depth (cm) 0.4 cm   Wound Surface Area (cm^2) 21.44 cm^2   Wound Volume (cm^3) 5.718 cm^3   Drainage Characteristics/Odor serosanguineous   Drainage Amount scant   Care, Wound cleansed with;sterile normal saline   Dressing Care dressing removed;dressing applied;other (see comments);silicone border foam  (Wound base covered with normal saline moistened 2 inch gauze roll.)   Periwound Care absorptive dressing applied   Wound 02/04/25 1200 Left anterior thigh unspecified   Placement Date/Time: 02/04/25 1200   Side: Left  Orientation: anterior  Location: thigh  Primary Wound Type: Traumatic  Type: unspecified   Wound Image    Dressing Appearance open to air   Closure None   Base dry;red   Periwound dry;pink   Periwound Temperature warm   Periwound Skin Turgor soft   Edges rolled/closed   Drainage Amount none   Care, Wound cleansed with;sterile  "normal saline   Dressing Care open to air   Periwound Care dry periwound area maintained   Wound 02/16/24 1700 Left posterior foot Pressure Injury   Placement Date/Time: 02/16/24 1700   Side: Left  Orientation: posterior  Location: foot  Primary Wound Type: Pressure Injury   Wound Image    Pressure Injury Stage U   Dressing Appearance open to air   Closure None   Base dry;black;eschar;moist;yellow;slough;red   Periwound dry;redness;pink   Periwound Temperature warm   Periwound Skin Turgor soft   Edges open   Wound Length (cm) 3.4 cm   Wound Width (cm) 4 cm   Wound Depth (cm) 0.4 cm   Wound Surface Area (cm^2) 10.68 cm^2   Wound Volume (cm^3) 2.848 cm^3   Drainage Characteristics/Odor serosanguineous   Drainage Amount scant   Care, Wound cleansed with;sterile normal saline   Dressing Care dressing applied;other (see comments);silicone border foam  (Wound base covered with normal saline moistened 2 inch gauze roll.)   Periwound Care absorptive dressing applied   Colostomy   Placement date: If unknown, DO NOT use \"Add Comment\" note/Placement time: If unknown, DO NOT use \"Add Comment\" note: 02/06/25 1322   Hand Hygiene Completed: Yes   Wound Image     Stomal Appliance 1 piece;Clean;Dry;Intact;Drainable;Changed   Stoma Appearance irregular;moist;red;protruding above skin level;mucocutaneous separation   Peristomal Assessment Red;Other (Comment)  (Mucocutaneous separation noted from 12-4 o'clock with moist red tissue.  Scattered areas of erosion noted from 7-11 o'clock with moist red tissue and yellow/tan slough.)   Accessories/Skin Care cleansed with water;skin sealant;other (see comments)  (Silver impregnated Hydrofiber applied over areas of erosion and mucocutaneous separation. Bilateral sites covered with strips of hydrocolloid dressing.)   Stoma Function stool   Stool Color brown   Stool Consistency liquid   Treatment Bag change;Site care;Placement checked    Left Distal Foot    Abbreviated Note    Wound Check / " Follow-up:  Patient seen today for wound consult.  Patient is well-known to wound care RN from previous admission. Patient underwent an incision and drainage of perianal abscess on 1/26/2025. Patient underwent diverting laprascopic colostomy placement on 2/6/25 to assist with healing of his perianal abscess. Patient reports he has been bedridden for approximately 2 to 3 years due to the pressure injury to his left heel.  Patient initially discharged home with transition to home wound VAC unit on 2/20/2025.  Patient reports he was home for 3 days before he had to be admitted to a rehab facility, due to inability to get into chair.  Patient reports he was not seen by home health following discharge, as the nurse who was assigned to him resigned and he was not assigned a new nurse.  Patient states his wound VAC dressing was changed once while in rehab, prior to being discontinued per the wound care provider at the facility, whom recommended dressing changes.  Primary and reports patient did not have a wound VAC in place upon arrival to unit.  While performing wound assessment, 1 piece of white foam is noted to most distal aspect of wound base near anus.  Initial plan to resume negative pressure wound VAC therapy during admission on hold at this time due to the presence of frayed black wound VAC fibers embedded into wound base.  Notified general surgeon and attending MD of findings and plan to reassess for potential wound VAC placement later in admission.  Indwelling urinary catheter in place at time of assessment.  A specialty air mattress has been ordered for patient.  Nutritional supplements have been ordered for patient.    Please see wound assessments above:    Stage III pressure injury noted to perirectal region.  Intermittent frayed black wound VAC fibers noted to proximal aspect of wound base within area of maroon coloration. Removed scattered fibers by cleansing with normal saline and gauze. Additional wound  nurse assisted with removal of loose fibers.  Intermittent areas of coagulated red tissue noted to distal aspect of wound base where white foam was noted.  Minimal areas of moist yellow slough.  Slight odor is noted post cleansing/irrigation with normal saline and gauze.  Recommending twice daily dressing changes with wound base being covered with 1/8 strength Dakin's moistened fluffed gauze roll, and secured with silicone border dressing.  Implement every 2 hour turns and offload at all times.  Keep patient clean, dry, and free from all moisture.  Discussed findings with Attending MD and orders obtained.     Chronic unstageable pressure injury to left heel.  Wound base is primarily covered with dry black eschar; however edges are slightly lifted with moist yellow slough noted surrounding.  Scattered areas of moist red tissue noted.  Slight odor is noted post cleansing with normal saline and gauze. Recommending daily dressing changes with a nickel thick layer of Santyl being applied over wound base, covered with 1/8 strength Dakin's moistened fluffed gauze roll, and secured with a vented silicone border dressing.  Recommending heels remain floated at all times.  Recommending application of blue top moisture barrier right heel 4 times a day.  Discussed findings with Attending MD and orders obtained.    Colostomy noted to left lower quadrant.  Received order to remove ostomy bar from General Surgeon.  Removed existing ostomy pouch with adhesive remover spray.  Removed ostomy bar.  Cleansed stoma and peristomal tissue with warm water and washcloth, blotted dry.  Stoma is noted to be irregular, moist, red, and protruding above skin level.  Mucocutaneous separation noted from 12-4 o'clock with moist red tissue noted within.  Scattered erosion noted from 7-11 o'clock with moist red tissue and yellow to tan stringy slough.  Area of scattered erosions from 7-11 o'clock measures 4 cm x 2 cm.  Peristomal tissue with redness  noted. Stoma measures 45 mm x 30 mm.  Applied Skin-Prep to periwound tissue.  Applied silver impregnated Hydrofiber over areas of erosion and mucocutaneous separation.  Secured both sites with strips of hydrocolloid dressing.  Cut to fit and applied a new 1 piece flat Coloplast pouch to prepped site.  Seal obtained with no lifting or leaking.  Recommending diligent ostomy care.  Notified General Surgeon of findings.    Scattered scabs noted to bilateral legs with dry red and crusted dark red tissue.  Blanchable redness noted to right heel and elbows. Recommending quality skin care and hygiene with application of Aquaphor twice a day.    Impression: Stage III pressure injury to perirectal region.  Chronic unstageable pressure injury to left heel.  Existing colostomy to left lower quadrant.  Scattered scabs to bilateral legs.    Short term goals:  Regain skin integrity, skin protection, moisture prevention, pressure reduction, quality skin care and hygiene, twice a day dressing change, daily dressing change, ostomy management.    Amparo Novoa RN    3/17/2025    23:49 EDT

## 2025-03-18 NOTE — PROGRESS NOTES
[facility]   Hospitalist Progress Note  Date: 3/18/2025  Patient Name: Preston Wallis  : 1965  MRN: [id number]  Date of admission: 3/16/2025      Subjective   Subjective     Chief Complaint: Follow up for coffee ground emesis    Summary: 58 y/o M with COPD on 2 L oxygen, MILADY, hypertension, atrial fibrillation on Eliquis, diabetes, Pseudomonas pneumonia who presented from nursing facility with coffee-ground emesis.  Tachycardic and hypertensive.  Lactate 1.2.  Hemoglobin 9.2 which is near baseline.  GI consulted.  On IV PPI.    Interval Followup:   No acute events overnight  Afebrile, blood pressures on the high side, on 2 L nasal cannula  No episodes of vomiting.  Denies abdominal pain  N.p.o. for EGD    Objective   Objective     Vitals:   Temp:  [97.3 °F (36.3 °C)-98.1 °F (36.7 °C)] 98.1 °F (36.7 °C)  Heart Rate:  [88-97] 88  Resp:  [18-20] 19  BP: (147-175)/(54-74) 153/69  Flow (L/min) (Oxygen Therapy):  [2] 2  Physical Exam    Constitutional: conversant, NAD   Respiratory:  nonlabored respirations    Cardiovascular:  RRR, no edema   Gastrointestinal: soft, nondistended, LLQ colostomy   Neurologic: Alert, speech clear   Skin: Extremities warm.  Chronic gluteal wound, POA, wound images reviewed    Result Review    Result Review:  I have personally reviewed the following over the last 24 hours (07:00 to 07:00) and agree with the following findings  [x]  Laboratory  CBC          3/16/2025    14:43 3/17/2025    00:03 3/17/2025    07:48 3/17/2025    17:09 3/18/2025    06:15   CBC   WBC 11.72     10.77    RBC 3.35     3.08    Hemoglobin 9.2  8.3  8.2  9.4  8.7    Hematocrit 29.9  27.2  27.0  30.4  27.6    MCV 89.3     89.6    MCH 27.5     28.2    MCHC 30.8     31.5    RDW 15.2     14.9    Platelets 516     437      CMP          3/16/2025    14:43 3/17/2025    02:13 3/18/2025    06:15   CMP   Glucose 124  105  136    BUN 20  20  19    Creatinine 2.09  1.90  2.01    EGFR 35.8  40.1  37.5    Sodium 140  141  138     Potassium 3.8  3.6  3.5    Chloride 101  102  101    Calcium 9.2  8.4  8.7    Total Protein 7.3  6.7     Albumin 2.9  2.6     Globulin 4.4  4.1     Total Bilirubin 0.2  0.2     Alkaline Phosphatase 196  167     AST (SGOT) 34  35     ALT (SGPT) 30  29     Albumin/Globulin Ratio 0.7  0.6     BUN/Creatinine Ratio 9.6  10.5  9.5    Anion Gap 10.1  11.3  8.0      []  Microbiology  [x]  Radiology   CT Abdomen Pelvis Without Contrast  Result Date: 3/16/2025  CT ABDOMEN PELVIS WO CONTRAST Date of Exam: 3/16/2025 3:16 PM EDT Indication: Flank pain, kidney stone suspected Abdominal pain flank pain. Comparison: CT abdomen pelvis 1/25/2025. Chest CT 1/24/2025. Technique: Axial CT images were obtained of the abdomen and pelvis without the administration of contrast. Reconstructed coronal and sagittal images were also obtained. Automated exposure control and iterative construction methods were used. Findings: Included Chest: Bibasal atelectasis. Bronchiectasis in the left lower lobe. Mildly decreased tree-in-bud nodular opacities in the left lower lobe since 1/24/2025, though these are partially imaged and would recommend referring to prior chest CT report for further recommendations. Trace pericardial effusion. Liver: No significant abnormality.  Gallbladder and biliary tree: Cholecystectomy  Spleen: No significant abnormality.  Pancreas: No significant abnormality.  Adrenal glands: No significant abnormality.  Kidneys and ureters: No significant abnormality.  Stomach and duodenum:No significant abnormality. Small and large bowel: Status post diverting colostomy. Mild fat stranding at the colostomy, likely postsurgical change. Colonic diverticulosis. No evidence of bowel obstruction. Normal-appearing appendix. Peritoneal cavity: No free fluid or free air. Bladder: Doyle in decompressed bladder  Pelvic organs: No significant abnormality.  Vasculature: Atherosclerotic calcifications.  Lymph nodes: No pathologic appearing lymph  nodes by imaging criteria.  Bones and soft tissues: Degenerative changes of the imaged spine. No acute osseous abnormality. Left proximal femoral fixation hardware. Interval umbilical hernia repair with mild amount of underlying fat stranding/inflammatory change, favored postsurgical. No organized fluid collection in the region of the previously seen perianal abscess.     Impression: No acute findings in the abdomen/pelvis. Status post diverting colostomy. Mild fat stranding at the colostomy, likely postsurgical change. No evidence of bowel obstruction. Interval umbilical hernia repair with mild amount of underlying fat stranding/inflammatory change, favored postsurgical. Mildly decreased tree-in-bud nodular opacities in the left lower lobe since 1/24/2025, though these are partially imaged and would recommend referring to prior chest CT report for further recommendations. Electronically Signed: Farshad Soriano  3/16/2025 3:55 PM EDT  Workstation ID: [id number]    XR Chest 1 View  Result Date: 3/16/2025  XR CHEST 1 VW Date of Exam: 3/16/2025 2:50 PM EDT Indication: SOA Triage Protocol Comparison: February 5, 2025 Findings: The patient is somewhat rotated. A right subclavian port has its tip at the mid SVC. A left subclavian catheter has its tip at the upper SVC. There are coarse bilateral interstitial markings. There is a small right pleural effusion. The heart and mediastinal contours appear stable. There are degenerative changes along the right shoulder.     Impression: 1.Coarse bilateral interstitial markings, which could reflect interstitial pulmonary edema or atypical pneumonia. 2.Small right pleural effusion. Electronically Signed: Fransico Truong MD  3/16/2025 3:32 PM EDT  Workstation ID: [id number]      [x]  EKG/Telemetry monitor personally reviewed and independently interpreted: NSR  []  Cardiology/Vascular   []  Pathology  []  Old records  [x]  Other:    Intake/Output Summary (Last 24 hours) at 3/18/2025  0825  Last data filed at 3/18/2025 0400  Gross per 24 hour   Intake 240 ml   Output 1575 ml   Net -1335 ml         Assessment & Plan   Assessment / Plan     Assessment/Plan:  Coffee ground emesis  Chronic anemia  Essential HTN  Afib, no longer on Eliquis  Type 2 diabetes  COPD on chronic oxygen 2 L/min  History of MDR Pseudomonas pneumonia  History of perianal abscess status post diverting colostomy  Thrombocytosis  CKD stage IIIb  BPH, chronic intermittent catheterization use outpatient   Chronic anxiety         No episodes of vomiting or overt bleeding since admission  CT abdomen pelvis no contrast no acute intra-abdominal finding reported  Initial hemoglobin 9.2, down to 8.7 today. Platelets WNL. INR 1.27  Recent ferritin 608, iron 31, TSAT 17%; continue oral iron  Eliquis and baby aspirin on hold  Avoid NSAIDs  Continue IV PPI twice daily  H&H q daily, transfuse for hemoglobin less than 7  GI following, getting EGD today, appreciate assistance  Continue diltiazem  mg daily  Kidney function near baseline.  Nonoliguric.  Avoid nephrotoxic agent.  Trend renal function and electrolytes  Continue scheduled nebulizers, continue 2 L/min nasal cannula which is baseline  Continue finasteride and tamsulosin  Continue home BuSpar  Wound care RN following.  Appreciate evaluation. recommendations reviewed.  Twice daily dressing changes, every 2 hours turns, specialty mattress, will need wound VAC reapplied  Continue ostomy care  Maintain SCDs  Restart diet postprocedure  CBC, BMP in AM    Discussed plan with RN.    VTE Prophylaxis:  Mechanical VTE prophylaxis orders are present.      CODE STATUS:   Code Status (Patient has no pulse and is not breathing): CPR (Attempt to Resuscitate)  Medical Interventions (Patient has pulse or is breathing): Full Support    Electronically signed by Leonard Multani DO, 03/18/25, 3:58 PM EDT.

## 2025-03-18 NOTE — ANESTHESIA PREPROCEDURE EVALUATION
Anesthesia Evaluation     Patient summary reviewed and Nursing notes reviewed   no history of anesthetic complications:   NPO Solid Status: > 8 hours  NPO Liquid Status: > 2 hours           Airway   Mallampati: I  TM distance: >3 FB  Neck ROM: full  No difficulty expected  Comment: Large beard  Dental    (+) edentulous    Pulmonary    (+) pneumonia , a smoker Former, COPD,home oxygen (2L NC), shortness of breath, sleep apnea, decreased breath sounds  Cardiovascular - normal exam  Exercise tolerance: poor (<4 METS)    PT is on anticoagulation therapy    (+) hypertension, dysrhythmias Paroxysmal Atrial Fib, CHF Systolic <55%, PVD, DVT, hyperlipidemia    ROS comment: Chest pain and elevated trop on arrival. Negative stress this admission     Neuro/Psych  (+) seizures, numbness    ROS Comment: Non ambulatory   GI/Hepatic/Renal/Endo    (+) morbid obesity, GERD, renal disease- CRI, diabetes mellitus poorly controlled    Musculoskeletal     Abdominal   (+) obese   Substance History      OB/GYN          Other   arthritis, blood dyscrasia anemia,     ROS/Med Hx Other: Ozempic- last taken 3/8/25    3/16/25 Admit-  59 y.o. male past medical history of CKD stage III, diabetes mellitus, COPD that presented to the emergency department for evaluation of coffee-ground emesis.      Patient had lap colostomy on 2/6/25 and bronchoscopy on 2/3/25     BORDERLINE ECG -  Sinus tachycardia  Borderline prolonged MS interval  Borderline repolarization abnormality  Date and Time of Study:2025-03-16 15:10:57    1/23/25 Echo  ·  Left ventricular systolic function is low normal. Calculated left ventricular EF = 49.4%  ·  Left ventricular wall thickness is consistent with moderate concentric hypertrophy.  ·  Left ventricular diastolic function is consistent with (grade I) impaired relaxation.  ·  There is a small (<1cm) pericardial effusion adjacent to the right ventricle.      Stress Test 1/27/25  ·  Left ventricular ejection fraction is mildly  reduced (Calculated EF = 40%).  ·  Low probability of ischemia during this study, patient may had an apical infarct versus apical thinning..  ·  Findings consistent with an equivocal ECG stress test.       03/18/25 06:15  Sodium: 138  Potassium: 3.5  Chloride: 101  CO2: 29.0  Anion Gap: 8.0  BUN: 19  Creatinine: 2.01 (H)  BUN/Creatinine Ratio: 9.5  eGFR: 37.5 (L)  Glucose: 136 (H)  Calcium: 8.7  03/18/25 06:15  WBC: 10.77  RBC: 3.08 (L)  Hemoglobin: 8.7 (L)  Hematocrit: 27.6 (L)  Platelets: 437      Currently on 2L NC             Hb 7.2  Cr 2.4    Interpretation Summary 1/23/25         Left ventricular systolic function is low normal. Calculated left ventricular EF = 49.4%    Left ventricular wall thickness is consistent with moderate concentric hypertrophy.    Left ventricular diastolic function is consistent with (grade I) impaired relaxation.    There is a small (<1cm) pericardial effusion adjacent to the right ventricle.       Interpretation Summary Stress 1/27/2025         Left ventricular ejection fraction is mildly reduced (Calculated EF = 40%).    Low probability of ischemia during this study, patient may had an apical infarct versus apical thinning..    Findings consistent with an equivocal ECG stress test.    Last eliquis 1/25 pm       Anesthesia Plan    ASA 4     general   total IV anesthesia  (Patient understands anesthesia not responsible for dental damage. Risks explained including allergic reactions, BP, HR, O2 changes, aspiration, advanced airway placement. Pt verbalized understanding.)  intravenous induction     Anesthetic plan, risks, benefits, and alternatives have been provided, discussed and informed consent has been obtained with: patient.    Plan discussed with CRNA.        CODE STATUS:    Code Status (Patient has no pulse and is not breathing): CPR (Attempt to Resuscitate)  Medical Interventions (Patient has pulse or is breathing): Full Support

## 2025-03-18 NOTE — THERAPY EVALUATION
Acute Care - Physical Therapy Initial Evaluation  YAIMA [facility]     Patient Name: Preston Wallis  : 1965  MRN: [id number]  Today's Date: 3/18/2025      Visit Dx:     ICD-10-CM ICD-9-CM   1. Gastrointestinal hemorrhage, unspecified gastrointestinal hemorrhage type  K92.2 578.9   2. Decreased activities of daily living (ADL)  Z78.9 V49.89   3. Difficulty walking  R26.2 719.7     Patient Active Problem List   Diagnosis    Chronic cough    Pneumonia due to COVID-19 virus    Polyneuropathy    Paroxysmal atrial fibrillation    Obstructive sleep apnea    MRSA pneumonia    Low back pain    Chronic diastolic (congestive) heart failure    Allergies    COPD exacerbation    Chronic anticoagulation    Benign prostatic hyperplasia    Difficulty using continuous positive airway pressure (CPAP) device    Stage 3a chronic kidney disease    Iron deficiency anemia secondary to inadequate dietary iron intake    Vitamin D deficiency    Class 3 severe obesity with serious comorbidity in adult    Lower extremity edema    Elevated alkaline phosphatase level    Venous insufficiency (chronic) (peripheral)    Tobacco abuse, in remission    History of Pseudomonas pneumonia    Chronic dyspnea    Gastroesophageal reflux disease    Bronchiectasis without complication    ERIC (acute kidney injury)    Altered mental status    History of anemia due to chronic kidney disease    Hyperlipidemia    Luetscher's syndrome    Neurogenic bladder    Class 1 obesity    Pneumonia due to Pseudomonas species    Seizures    Primary osteoarthritis of left knee    Other constipation    Chronic obstructive pulmonary disease    Type 2 diabetes mellitus with hyperglycemia, with long-term current use of insulin    Essential hypertension    Stage 3b chronic kidney disease    Annual physical exam    Long-term use of high-risk medication    Personal history of PE (pulmonary embolism)    Encounter for aftercare for healing closed traumatic fracture of left femur    Chronic  "pain of left knee    Primary osteoarthritis of right knee    Sepsis    Bronchiectasis with acute exacerbation    Bacterial pneumonia    Anemia    Closed fracture of left tibial plateau    Septic joint    Septic arthritis    Skin ulcer of left heel, limited to breakdown of skin    Ulcer of left foot, limited to breakdown of skin    Left foot pain    Wheezing    Acute on chronic respiratory failure with hypercapnia    Chronic respiratory failure with hypoxia and hypercapnia    Acute hypoxic on chronic hypercapnic respiratory failure    Impaired cognition    Atherosclerosis of native arteries of the extremities with ulceration    PNA (pneumonia)    Perianal abscess    1. Incision and drainage of posterior perianal abscess 2. Sharp excisional debridement through skin and subcutaneous tissue in the posterior perianal area, 9 x 6 x 1 cm    Wound of gluteal cleft    Chronic kidney disease, stage IV (severe)    Colostomy status    GI bleed     Past Medical History:   Diagnosis Date    1. Incision and drainage of posterior perianal abscess 2. Sharp excisional debridement through skin and subcutaneous tissue in the posterior perianal area, 9 x 6 x 1 cm 01/26/2025    Age-related cognitive decline     Allergic contact dermatitis     Allergies     Anemia     Bedbound     11/2023 \"MY LEG MUSCLES STOPPED WORKING\"    Bronchiectasis with acute lower respiratory infection     Charcot foot due to diabetes mellitus 09/10/2013    Chronic diastolic (congestive) heart failure     Chronic kidney disease     Chronic respiratory failure with hypoxia     Closed supracondylar fracture of femur 01/12/2022    COPD (chronic obstructive pulmonary disease)     Deep vein thrombosis (DVT) of lower extremity associated with air travel 01/13/2023    Dependence on supplemental oxygen     Eczema     Erectile dysfunction     due to organic reasons    Essential (primary) hypertension     Fracture     closed fracture of other tarsal and metatarsal bones    " "Fracture of proximal humerus 01/13/2023    GERD without esophagitis     High risk medication use     Hypercholesteremia     Hypomagnesemia     Infected stasis ulcer of left lower extremity 01/13/2023    Insomnia     Low back pain     Major depressive disorder     Morbid (severe) obesity due to excess calories     MRSA pneumonia     Muscle weakness     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 without evidence of necrosis     Obstructive sleep apnea (adult) (pediatric)     On home O2     REPORTS WEARING 2L/NC AAT    Other forms of dyspnea     Other long term (current) drug therapy     Other specified noninfective gastroenteritis and colitis     Other spondylosis, lumbar region     Pain in both knees     Paroxysmal atrial fibrillation     Peripheral neuropathy     attributed to type 2 diabetes    Pneumonia, unspecified organism     Polyneuropathy     Rash and other nonspecific skin eruption     Self-catheterizes urinary bladder     EVERY 4 HOURS    Smoking     \"SOMETIMES\"    Syncope and collapse     Tachycardia     Tinnitus 01/13/2023    Type 1 diabetes mellitus with diabetic chronic kidney disease     Type 2 diabetes mellitus     Unspecified fall, initial encounter     Urinary retention      Past Surgical History:   Procedure Laterality Date    BRONCHOSCOPY N/A 1/28/2025    Procedure: BRONCHOSCOPY: BAL: insertion of lighted instrument to view inside the lung;  Surgeon: Walter Nicole DO;  Location: [facility] MAIN OR;  Service: Pulmonary;  Laterality: N/A;    BRONCHOSCOPY N/A 2/3/2025    Procedure: BRONCHOSCOPY WITH BRONCHOALVEOLAR LAVAGE, POSSIBLE BIOPSY, BRUSHING, WASHING, AIRWAY INSPECTION: insertion of lighted instrument to view inside the lung;  Surgeon: Chadd Swan MD;  Location: [facility] MAIN OR;  Service: Pulmonary;  Laterality: N/A;    CARDIAC CATHETERIZATION Left 8/15/2024    Procedure: Carbon dioxide aortogram with left leg angiogram, possible angioplasty " or stenting;  Surgeon: Moshe Willson MD;  Location: [facility] CATH INVASIVE LOCATION;  Service: Vascular;  Laterality: Left;    CHOLECYSTECTOMY      COLOSTOMY N/A 2/6/2025    Procedure: COLOSTOMY LAPAROSCOPIC; plain text: creation of colostomy using small incisions;  Surgeon: Emerson Canada MD;  Location: [facility] OR [facility];  Service: General;  Laterality: N/A;    CYSTOSCOPY      FEMUR SURGERY Left     Shravan placed    INCISION AND DRAINAGE ABSCESS N/A 1/26/2025    Procedure: INCISION AND DRAINAGE ABSCESS; plain text: incision and drain pus from buttocks wound;  Surgeon: Emerson Canada MD;  Location: [facility] OR [facility];  Service: General;  Laterality: N/A;    KNEE SURGERY Left     OTHER SURGICAL HISTORY Left     venous port, REMOVED    PORTACATH PLACEMENT Right     TIBIAL PLATEAU OPEN REDUCTION INTERNAL FIXATION Left 12/22/2023    Procedure: TIBIAL PLATEAU OPEN REDUCTION INTERNAL FIXATION;  Surgeon: Hugo Kline MD;  Location: [facility] OR;  Service: Orthopedics;  Laterality: Left;    TONSILLECTOMY AND ADENOIDECTOMY       PT Assessment (Last 12 Hours)       PT Evaluation and Treatment       Row Name 03/18/25 1000          Physical Therapy Time and Intention    Subjective Information complains of;weakness  -DP     Document Type evaluation  -DP     Mode of Treatment individual therapy;physical therapy  -DP     Patient Effort fair  -DP       Row Name 03/18/25 1000          General Information    Patient Profile Reviewed yes  -DP     Patient Observations alert;agree to therapy  -DP     General Observations of Patient Pt was at a nursing home for rehab prior to this hospital admission. He is WC bound at baseline and used a sliding board for out of bed transfers.  -DP     Equipment Currently Used at Home wheelchair  -DP     Existing Precautions/Restrictions fall  -DP     Barriers to Rehab none identified  -DP       Row Name 03/18/25 1000          Living Environment    Current Living Arrangements extended care facility   -DP     Home Accessibility wheelchair accessible  -DP     People in Home facility resident  -DP       Row Name 03/18/25 1000          Range of Motion (ROM)    Range of Motion ROM is WFL  -DP       Row Name 03/18/25 1000          Strength (Manual Muscle Testing)    Strength (Manual Muscle Testing) bilateral lower extremities  2-/5  -DP       Row Name 03/18/25 1000          Bed Mobility    Bed Mobility supine-sit-supine  -DP     Supine-Sit-Supine [place] (Bed Mobility) maximum assist (25% patient effort);dependent (less than 25% patient effort)  -DP       Row Name 03/18/25 1000          Transfers    Comment, (Transfers) deferred  -DP       Row Name 03/18/25 1000          Gait/Stairs (Locomotion)    Comment, (Gait/Stairs) non ambulatory at baseline  -DP       Row Name             Wound 01/23/25 2330 coccyx pressure injury    Wound - Properties Group Placement Date: 01/23/25  -SW Placement Time: 2330 -SW Present on Original Admission: Y  -SW Location: coccyx  -SW Primary Wound Type: Pressure inj  -SW Type: pressure injury  -SW    Retired Wound - Properties Group Placement Date: 01/23/25  -SW Placement Time: 2330  -SW Present on Original Admission: Y  -SW Location: coccyx  -SW Primary Wound Type: Pressure inj  -SW Type: pressure injury  -SW    Retired Wound - Properties Group Placement Date: 01/23/25  -SW Placement Time: 2330  -SW Present on Original Admission: Y  -SW Location: coccyx  -SW Primary Wound Type: Pressure inj  -SW Type: pressure injury  -SW    Retired Wound - Properties Group Date first assessed: 01/23/25  -SW Time first assessed: 2330  -SW Present on Original Admission: Y  -SW Location: coccyx  -SW Primary Wound Type: Pressure inj  -SW Type: pressure injury  -SW      Row Name             [REMOVED] Wound abdomen Incision    Wound - Properties Group Location: abdomen  -NC Primary Wound Type: Incision  -NC Wound Outcome: Healed  -KE Removal Date: 03/17/25  -KE Removal Time: 2336 -KE    Retired Wound -  Properties Group Location: abdomen  -NC Primary Wound Type: Incision  -NC Wound Outcome: Healed  -KE Removal Date: 03/17/25  -KE Removal Time: 2336  -KE    Retired Wound - Properties Group Location: abdomen  -NC Primary Wound Type: Incision  -NC Removal Date: 03/17/25  -KE Removal Time: 2336 -KE Wound Outcome: Healed  -KE    Retired Wound - Properties Group Location: abdomen  -NC Primary Wound Type: Incision  -NC Resolution Date: 03/17/25  -KE Resolution Time: 2336 -KE Wound Outcome: Healed  -KE      Row Name             Wound 02/04/25 1200 Left anterior thigh unspecified    Wound - Properties Group Placement Date: 02/04/25  -KE Placement Time: 1200  -KE Side: Left  -KE Orientation: anterior  -KE Location: thigh  -KE Primary Wound Type: Traumatic  -KE Type: unspecified  -KE    Retired Wound - Properties Group Placement Date: 02/04/25  -KE Placement Time: 1200  -KE Side: Left  -KE Orientation: anterior  -KE Location: thigh  -KE Primary Wound Type: Traumatic  -KE Type: unspecified  -KE    Retired Wound - Properties Group Placement Date: 02/04/25  -KE Placement Time: 1200  -KE Side: Left  -KE Orientation: anterior  -KE Location: thigh  -KE Primary Wound Type: Traumatic  -KE Type: unspecified  -KE    Retired Wound - Properties Group Date first assessed: 02/04/25  -KE Time first assessed: 1200  -KE Side: Left  -KE Location: thigh  -KE Primary Wound Type: Traumatic  -KE Type: unspecified  -KE      Row Name             [REMOVED] Wound 02/11/25 1238 Left distal plantar    Wound - Properties Group Placement Date: 02/11/25  -KE Placement Time: 1238  -KE Side: Left  -KE Orientation: distal  -KE Location: plantar  -KE Wound Outcome: Healed  -KE Removal Date: 03/17/25  -KE Removal Time: 2338  -KE Type: unspecified  -KE    Retired Wound - Properties Group Placement Date: 02/11/25  -KE Placement Time: 1238  -KE Side: Left  -KE Orientation: distal  -KE Location: plantar  -KE Wound Outcome: Healed  -KE Removal Date: 03/17/25  -KE  Removal Time: 2338 -KE Type: unspecified  -KE    Retired Wound - Properties Group Placement Date: 02/11/25  -KE Placement Time: 1238  -KE Side: Left  -KE Orientation: distal  -KE Location: plantar  -KE Removal Date: 03/17/25  -KE Removal Time: 2338 -KE Wound Outcome: Healed  -KE Type: unspecified  -KE    Retired Wound - Properties Group Date first assessed: 02/11/25  -KE Time first assessed: 1238  -KE Side: Left  -KE Location: plantar  -KE Resolution Date: 03/17/25  -KE Resolution Time: 2338 -KE Wound Outcome: Healed  -KE Type: unspecified  -KE      Row Name             Wound 02/16/24 1700 Left posterior foot Pressure Injury    Wound - Properties Group Placement Date: 02/16/24  -AG Placement Time: 1700  -AG Side: Left  -AG Orientation: posterior  -AG Location: foot  -AG Primary Wound Type: Pressure inj  -AG    Retired Wound - Properties Group Placement Date: 02/16/24  -AG Placement Time: 1700  -AG Side: Left  -AG Orientation: posterior  -AG Location: foot  -AG Primary Wound Type: Pressure inj  -AG    Retired Wound - Properties Group Placement Date: 02/16/24  -AG Placement Time: 1700  -AG Side: Left  -AG Orientation: posterior  -AG Location: foot  -AG Primary Wound Type: Pressure inj  -AG    Retired Wound - Properties Group Date first assessed: 02/16/24  -AG Time first assessed: 1700  -AG Side: Left  -AG Location: foot  -AG Primary Wound Type: Pressure inj  -AG      Row Name 03/18/25 1000          Plan of Care Review    Plan of Care Reviewed With patient  -DP     Outcome Evaluation Pt presents with decreased strength, transfers and functional mobility. Pt will benefit from inpatient PT services and placement in a rehab facility upon discharge.  -DP       Row Name 03/18/25 1000          Therapy Assessment/Plan (PT)    Criteria for Skilled Interventions Met (PT) yes;meets criteria  -DP     Therapy Frequency (PT) daily  -DP     Predicted Duration of Therapy Intervention (PT) 10 days  -DP     Problem List (PT)  problems related to;mobility;balance  -DP     Activity Limitations Related to Problem List (PT) unable to ambulate safely;unable to transfer safely  -DP       Row Name 03/18/25 1000          PT Evaluation Complexity    History, PT Evaluation Complexity no personal factors and/or comorbidities  -DP     Examination of Body Systems (PT Eval Complexity) total of 4 or more elements  -DP     Clinical Presentation (PT Evaluation Complexity) stable  -DP     Clinical Decision Making (PT Evaluation Complexity) low complexity  -DP     Overall Complexity (PT Evaluation Complexity) low complexity  -DP       Row Name 03/18/25 1000          Physical Therapy Goals    Bed Mobility Goal Selection (PT) bed mobility, PT goal 1  -DP     Transfer Goal Selection (PT) transfer, PT goal 1  -DP     Gait Training Goal Selection (PT) --  -DP       Row Name 03/18/25 1000          Bed Mobility Goal 1 (PT)    Activity/Assistive Device (Bed Mobility Goal 1, PT) sit to supine/supine to sit  -DP     Carrier Level/Cues Needed (Bed Mobility Goal 1, PT) minimum assist (75% or more patient effort)  -DP     Time Frame (Bed Mobility Goal 1, PT) 10 days  -DP       Row Name 03/18/25 1000          Transfer Goal 1 (PT)    Activity/Assistive Device (Transfer Goal 1, PT) bed-to-chair/chair-to-bed  -DP     Carrier Level/Cues Needed (Transfer Goal 1, PT) minimum assist (75% or more patient effort)  -DP     Time Frame (Transfer Goal 1, PT) 10 days  -DP       Row Name 03/18/25 1000          Gait Training Goal 1 (PT)    Activity/Assistive Device (Gait Training Goal 1, PT) --  -DP     Carrier Level (Gait Training Goal 1, PT) --  -DP     Distance (Gait Training Goal 1, PT) --  -DP     Time Frame (Gait Training Goal 1, PT) --  -DP               User Key  (r) = Recorded By, (t) = Taken By, (c) = Cosigned By      Initials Name Provider Type    Kelly Kerr, RN Registered Nurse    Cindy Lorenzo RN Registered Nurse    Amparo Prater, LEN  Registered Nurse    Gretchen Joseph, PT Physical Therapist    Florecita Burgess RN Registered Nurse                      PT Recommendation and Plan  Anticipated Discharge Disposition (PT): sub acute care setting  Planned Therapy Interventions (PT): balance training, bed mobility training, gait training, neuromuscular re-education, strengthening, transfer training  Therapy Frequency (PT): daily  Plan of Care Reviewed With: patient  Outcome Evaluation: Pt presents with decreased strength, transfers and functional mobility. Pt will benefit from inpatient PT services and placement in a rehab facility upon discharge.   Outcome Measures       Row Name 03/18/25 1000             How much help from another person do you currently need...    Turning from your back to your side while in flat bed without using bedrails? 3  -DP      Moving from lying on back to sitting on the side of a flat bed without bedrails? 2  -DP      Moving to and from a bed to a chair (including a wheelchair)? 1  -DP      Standing up from a chair using your arms (e.g., wheelchair, bedside chair)? 1  -DP      Climbing 3-5 steps with a railing? 1  -DP      To walk in hospital room? 1  -DP      AM-PAC 6 Clicks Score (PT) 9  -DP         Functional Assessment    Outcome Measure Options AM-PAC 6 Clicks Basic Mobility (PT)  -DP                User Key  (r) = Recorded By, (t) = Taken By, (c) = Cosigned By      Initials Name Provider Type    Gretchen Joseph, PT Physical Therapist                     Time Calculation:    PT Charges       Row Name 03/18/25 1057             Time Calculation    PT Received On 03/18/25  -DP      PT Goal Re-Cert Due Date 03/27/25  -DP         Untimed Charges    PT Eval/Re-eval Minutes 25  -DP         Total Minutes    Untimed Charges Total Minutes 25  -DP       Total Minutes 25  -DP                User Key  (r) = Recorded By, (t) = Taken By, (c) = Cosigned By      Initials Name Provider Type    Gretchen Joseph, PT Physical  Therapist                      PT G-Codes  Outcome Measure Options: AM-PAC 6 Clicks Basic Mobility (PT)  AM-PAC 6 Clicks Score (PT): 9    Gretchen Aly, PT  3/18/2025

## 2025-03-18 NOTE — PLAN OF CARE
Goal Outcome Evaluation:  Plan of Care Reviewed With: patient        Progress: no change  Outcome Evaluation: Patient presents with limitations of impaired functional strength, balance, endurance and limited safety/insight into own deficits requiring need for skilled OT services to facilitate return to prior level of function with ADLs.    Anticipated Discharge Disposition (OT): sub acute care setting (back to [facility])

## 2025-03-18 NOTE — H&P
"Pre Procedure History & Physical    Chief Complaint:   Nausea vomiting hematemesis    Subjective     HPI:   Nausea vomiting and hematemesis    Past Medical History:   Past Medical History:   Diagnosis Date    1. Incision and drainage of posterior perianal abscess 2. Sharp excisional debridement through skin and subcutaneous tissue in the posterior perianal area, 9 x 6 x 1 cm 01/26/2025    Age-related cognitive decline     Allergic contact dermatitis     Allergies     Anemia     Bedbound     11/2023 \"MY LEG MUSCLES STOPPED WORKING\"    Bronchiectasis with acute lower respiratory infection     Charcot foot due to diabetes mellitus 09/10/2013    Chronic diastolic (congestive) heart failure     Chronic kidney disease     Chronic respiratory failure with hypoxia     Closed supracondylar fracture of femur 01/12/2022    COPD (chronic obstructive pulmonary disease)     Deep vein thrombosis (DVT) of lower extremity associated with air travel 01/13/2023    Dependence on supplemental oxygen     Eczema     Erectile dysfunction     due to organic reasons    Essential (primary) hypertension     Fracture     closed fracture of other tarsal and metatarsal bones    Fracture of proximal humerus 01/13/2023    GERD without esophagitis     High risk medication use     Hypercholesteremia     Hypomagnesemia     Infected stasis ulcer of left lower extremity 01/13/2023    Insomnia     Low back pain     Major depressive disorder     Morbid (severe) obesity due to excess calories     MRSA pneumonia     Muscle weakness     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 without evidence of necrosis     Obstructive sleep apnea (adult) (pediatric)     On home O2     REPORTS WEARING 2L/NC AAT    Other forms of dyspnea     Other long term (current) drug therapy     Other specified noninfective gastroenteritis and colitis     Other spondylosis, lumbar region     Pain in both knees     Paroxysmal atrial fibrillation " "    Peripheral neuropathy     attributed to type 2 diabetes    Pneumonia, unspecified organism     Polyneuropathy     Rash and other nonspecific skin eruption     Self-catheterizes urinary bladder     EVERY 4 HOURS    Smoking     \"SOMETIMES\"    Syncope and collapse     Tachycardia     Tinnitus 01/13/2023    Type 1 diabetes mellitus with diabetic chronic kidney disease     Type 2 diabetes mellitus     Unspecified fall, initial encounter     Urinary retention        Past Surgical History:  Past Surgical History:   Procedure Laterality Date    BRONCHOSCOPY N/A 1/28/2025    Procedure: BRONCHOSCOPY: BAL: insertion of lighted instrument to view inside the lung;  Surgeon: Walter Nicole DO;  Location: [facility] MAIN OR;  Service: Pulmonary;  Laterality: N/A;    BRONCHOSCOPY N/A 2/3/2025    Procedure: BRONCHOSCOPY WITH BRONCHOALVEOLAR LAVAGE, POSSIBLE BIOPSY, BRUSHING, WASHING, AIRWAY INSPECTION: insertion of lighted instrument to view inside the lung;  Surgeon: Chadd Swan MD;  Location: [facility] MAIN OR;  Service: Pulmonary;  Laterality: N/A;    CARDIAC CATHETERIZATION Left 8/15/2024    Procedure: Carbon dioxide aortogram with left leg angiogram, possible angioplasty or stenting;  Surgeon: Moshe Willson MD;  Location: [facility] CATH INVASIVE LOCATION;  Service: Vascular;  Laterality: Left;    CHOLECYSTECTOMY      COLOSTOMY N/A 2/6/2025    Procedure: COLOSTOMY LAPAROSCOPIC; plain text: creation of colostomy using small incisions;  Surgeon: Emerson Canada MD;  [place] [facility] [place] [facility]  Service: General;  Laterality: N/A;    CYSTOSCOPY      FEMUR SURGERY Left     Shravan placed    INCISION AND DRAINAGE ABSCESS N/A 1/26/2025    Procedure: INCISION AND DRAINAGE ABSCESS; plain text: incision and drain pus from buttocks wound;  Surgeon: Emerson Canada MD;  [place] [facility] [place] [facility]  Service: General;  Laterality: N/A;    KNEE SURGERY Left     OTHER SURGICAL HISTORY Left     venous port, REMOVED    PORTACATH PLACEMENT " Right     TIBIAL PLATEAU OPEN REDUCTION INTERNAL FIXATION Left 12/22/2023    Procedure: TIBIAL PLATEAU OPEN REDUCTION INTERNAL FIXATION;  Surgeon: Hugo Kline MD;  Location: [facility];  Service: Orthopedics;  Laterality: Left;    TONSILLECTOMY AND ADENOIDECTOMY         Family History:  Family History   Problem Relation Age of Onset    Coronary artery disease Mother     Hypertension Mother     Diabetes type II Mother     Asthma Father     Diabetes type II Sister     Cancer Sister     Malig Hyperthermia Neg Hx        Social History:   reports that he quit smoking about 32 years ago. His smoking use included cigarettes. He started smoking about 44 years ago. He has a 12 pack-year smoking history. He has been exposed to tobacco smoke. He has never used smokeless tobacco. He reports that he does not currently use alcohol. He reports that he does not use drugs.    Medications:   Medications Prior to Admission   Medication Sig Dispense Refill Last Dose/Taking    arformoterol (BROVANA) 15 MCG/2ML nebulizer solution Take 2 mL by nebulization 2 (Two) Times a Day. 360 mL 3 Past Week    Aspirin Low Dose 81 MG EC tablet TAKE 1 TABLET BY MOUTH EVERY MORNING 30 tablet 3 Past Week    atorvastatin (LIPITOR) 20 MG tablet TAKE 1 TABLET BY MOUTH EVERY NIGHT AT BEDTIME 30 tablet 3 Past Week    budesonide (Pulmicort) 0.5 MG/2ML nebulizer solution Take 2 mL by nebulization 2 (Two) Times a Day. 360 mL 3 Past Week    busPIRone (BUSPAR) 15 MG tablet TAKE 1 TABLET BY MOUTH TWICE DAILY 60 tablet 3 Past Week    calcium citrate (CALCITRATE) 950 (200 Ca) MG tablet TAKE 1 TABLET BY MOUTH EVERY NIGHT AT BEDTIME 30 tablet 3 Past Week    Cholecalciferol (Vitamin D3) 50 MCG (2000 UT) tablet TAKE 1 TABLET BY MOUTH EVERY MORNING 30 tablet 3 Past Week    docusate sodium (COLACE) 100 MG capsule TAKE 1 CAPSULE BY MOUTH TWICE DAILY 60 capsule 3 Past Week    Eliquis 5 MG tablet tablet TAKE 1 TABLET BY MOUTH EVERY TWELVE HOURS 60 tablet 3  Past Week    famotidine (PEPCID) 40 MG tablet TAKE 1 TABLET BY MOUTH EVERY MORNING 30 tablet 3 Past Week    Farxiga 5 MG tablet tablet TAKE 1 TABLET BY MOUTH EVERY MORNING 30 tablet 3 Past Week    ferrous gluconate (FERGON) 324 MG tablet TAKE 1 TABLET BY MOUTH EVERY MORNING 30 tablet 3 Past Week    finasteride (PROSCAR) 5 MG tablet TAKE 1 TABLET BY MOUTH EVERY NIGHT AT BEDTIME 30 tablet 3 Past Week    folic acid (FOLVITE) 1 MG tablet TAKE 1 TABLET BY MOUTH EVERY NIGHT AT BEDTIME 30 tablet 3 Past Week    insulin detemir (Levemir) 100 UNIT/ML injection Inject 5 Units under the skin into the appropriate area as directed Every Night for 180 days. 15 mL 1 Past Week    Insulin Lispro, 1 Unit Dial, (HUMALOG) 100 UNIT/ML solution pen-injector Inject 5 Units under the skin into the appropriate area as directed 3 (Three) Times a Day Before Meals. 15 mL 0 Past Week    ipratropium-albuterol (DUO-NEB) 0.5-2.5 mg/3 ml nebulizer Take 3 mL by nebulization Every 4 (Four) Hours As Needed for Wheezing or Shortness of Air. 360 mL 5 Past Week    Magnesium Oxide -Mg Supplement 400 (240 Mg) MG tablet Take 1 tablet by mouth every night at bedtime.   Past Week    montelukast (SINGULAIR) 10 MG tablet Take 1 tablet by mouth Every Night. 30 tablet 5 Past Week    Multiple Vitamins-Iron (GNP One Daily Plus Iron) tablet TAKE 1 TABLET BY MOUTH EVERY MORNING 30 each 3 Past Week    Povidone-Iodine (Betadine) 5 % external solution 5% Apply 1 mL topically to the appropriate area as directed 2 (Two) Times a Day. Left Heel   Past Week    Semaglutide, 1 MG/DOSE, (Ozempic, 1 MG/DOSE,) 4 MG/3ML solution pen-injector Inject 1 mg under the skin into the appropriate area as directed 1 (One) Time Per Week. (Patient taking differently: Inject 1 mg under the skin into the appropriate area as directed 1 (One) Time Per Week. Saturday) 3 mL 5 Past Week    Sodium Hypochlorite (Anasept) 0.057 % liquid Apply 1 Application topically Daily. Sacrum   Past Week    Sodium  "Hypochlorite (Anasept) 0.057 % liquid Apply 1 Application topically As Needed (This is in addition to the QD scheduled application). Sacrum   Past Week    tamsulosin (FLOMAX) 0.4 MG capsule 24 hr capsule TAKE 1 CAPSULE BY MOUTH EVERY NIGHT AT BEDTIME 30 capsule 3 Past Week    tiotropium (SPIRIVA) 18 MCG per inhalation capsule Place 1 capsule into inhaler and inhale Daily. 30 capsule 5 Past Week    tobramycin PF (MOIZ) 300 MG/5ML nebulizer solution Take 5 mL by nebulization 2 (Two) Times a Day. nebulize 1 vial BY MOUTH TWICE DAILY FOR 28 DAYS ON AND 28 DAYS OFF   Past Week    vitamin C (ASCORBIC ACID) 500 MG tablet TAKE 1 TABLET BY MOUTH TWICE DAILY 60 tablet 3 Past Week    zinc sulfate (Zinc-220) 220 (50 Zn) MG capsule Take 1 capsule by mouth Daily.   Past Week       Allergies:  Benadryl [diphenhydramine] and Proventil [albuterol]        Objective     Blood pressure 165/70, pulse 92, temperature 98.9 °F (37.2 °C), temperature source Temporal, resp. rate 26, height 175.3 cm (69\"), weight 104 kg (228 lb 13.4 oz), SpO2 99%.    Physical Exam   Constitutional: Pt is oriented to person, place, and time and well-developed, well-nourished, and in no distress.   Mouth/Throat: Oropharynx is clear and moist.   Neck: Normal range of motion.   Cardiovascular: Normal rate, regular rhythm and normal heart sounds.    Pulmonary/Chest: Effort normal and breath sounds normal.   Abdominal: Soft. Nontender  Skin: Skin is warm and dry.   Psychiatric: Mood, memory, affect and judgment normal.     Assessment & Plan     Diagnosis:  Nausea vomiting and hematemesis    Anticipated Surgical Procedure:  EGD    The risks, benefits, and alternatives of this procedure have been discussed with the patient or the responsible party- the patient understands and agrees to proceed.            "

## 2025-03-18 NOTE — CONSULTS
"Nutrition Services    Patient Name: Preston Wallis  YOB: 1965  MRN: [id number]  Admission date: 3/16/2025      CLINICAL NUTRITION ASSESSMENT      Reason for Assessment   Pressure Injury     H&P:  Past Medical History:   Diagnosis Date    1. Incision and drainage of posterior perianal abscess 2. Sharp excisional debridement through skin and subcutaneous tissue in the posterior perianal area, 9 x 6 x 1 cm 01/26/2025    Age-related cognitive decline     Allergic contact dermatitis     Allergies     Anemia     Bedbound     11/2023 \"MY LEG MUSCLES STOPPED WORKING\"    Bronchiectasis with acute lower respiratory infection     Charcot foot due to diabetes mellitus 09/10/2013    Chronic diastolic (congestive) heart failure     Chronic kidney disease     Chronic respiratory failure with hypoxia     Closed supracondylar fracture of femur 01/12/2022    COPD (chronic obstructive pulmonary disease)     Deep vein thrombosis (DVT) of lower extremity associated with air travel 01/13/2023    Dependence on supplemental oxygen     Eczema     Erectile dysfunction     due to organic reasons    Essential (primary) hypertension     Fracture     closed fracture of other tarsal and metatarsal bones    Fracture of proximal humerus 01/13/2023    GERD without esophagitis     High risk medication use     Hypercholesteremia     Hypomagnesemia     Infected stasis ulcer of left lower extremity 01/13/2023    Insomnia     Low back pain     Major depressive disorder     Morbid (severe) obesity due to excess calories     MRSA pneumonia     Muscle weakness     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 without evidence of necrosis     Obstructive sleep apnea (adult) (pediatric)     On home O2     REPORTS WEARING 2L/NC AAT    Other forms of dyspnea     Other long term (current) drug therapy     Other specified noninfective gastroenteritis and colitis     Other spondylosis, lumbar region     Pain in " "both knees     Paroxysmal atrial fibrillation     Peripheral neuropathy     attributed to type 2 diabetes    Pneumonia, unspecified organism     Polyneuropathy     Rash and other nonspecific skin eruption     Self-catheterizes urinary bladder     EVERY 4 HOURS    Smoking     \"SOMETIMES\"    Syncope and collapse     Tachycardia     Tinnitus 01/13/2023    Type 1 diabetes mellitus with diabetic chronic kidney disease     Type 2 diabetes mellitus     Unspecified fall, initial encounter     Urinary retention         Current Problems:   Active Hospital Problems    Diagnosis     **GI bleed         Nutrition/Diet History         Narrative   Pt resting in bed. Currently NPO for pending endoscopy, on Clear Liquids at admit on 3/16.  Admitted for coffed ground emesis. Admitted from LT facility  Recent extended hospitalization here from 1/23/25 - 2/20/2025 and had a colostomy placed to help w/ wound healing by diverting stool away from wound  Followed by Wound Care for multiple wounds including Stage III to coccyx and Unstageable on Left Foot     Anthropometrics        Current Height, Weight Height: 175.3 cm (69\")  Weight: 104 kg (228 lb 13.4 oz)   Current BMI Body mass index is 33.79 kg/m².   BMI Classification Obese Class I   % % IBW   Adjusted Body Weight (ABW) 174lbs/79kg   Weight Hx  Wt Readings from Last 30 Encounters:   03/16/25 1431 104 kg (228 lb 13.4 oz)   03/14/25 1100 103 kg (228 lb)   03/10/25 1132 134 kg (296 lb)   02/20/25 0608 135 kg (296 lb 11.8 oz)   02/19/25 1210 (!) 136 kg (300 lb 4.3 oz)   02/18/25 0427 (!) 136 kg (300 lb 11.3 oz)   02/15/25 0330 101 kg (222 lb 7.1 oz)   02/14/25 0536 102 kg (224 lb 13.9 oz)   02/13/25 0401 104 kg (230 lb 6.1 oz)   02/12/25 0215 106 kg (233 lb 7.5 oz)   02/11/25 0327 108 kg (238 lb 1.6 oz)   02/10/25 0532 111 kg (244 lb 4.3 oz)   02/09/25 0300 111 kg (245 lb 6 oz)   02/08/25 0530 110 kg (243 lb 2.7 oz)   02/07/25 0700 109 kg (239 lb 3.2 oz)   02/06/25 0500 109 kg " (239 lb 6.7 oz)   02/05/25 0500 107 kg (236 lb 15.9 oz)   02/04/25 0437 110 kg (241 lb 13.5 oz)   02/03/25 0500 112 kg (247 lb 2.2 oz)   02/02/25 0500 118 kg (260 lb 2.3 oz)   02/01/25 0500 120 kg (263 lb 14.3 oz)   01/31/25 0500 121 kg (265 lb 10.5 oz)   01/30/25 0500 120 kg (263 lb 10.7 oz)   01/29/25 0500 123 kg (271 lb 2.7 oz)   01/27/25 0500 118 kg (260 lb 2.3 oz)   01/26/25 0308 113 kg (248 lb 14.4 oz)   01/25/25 0429 111 kg (245 lb 2.4 oz)   01/24/25 0924 111 kg (244 lb 0.8 oz)   01/23/25 1555 123 kg (270 lb 4.5 oz)   12/12/24 1050 109 kg (241 lb)   12/11/24 1048 109 kg (241 lb)   12/05/24 1155 111 kg (244 lb)   10/30/24 1004 111 kg (245 lb)   08/22/24 1439 109 kg (241 lb)   08/15/24 1325 112 kg (247 lb 9.2 oz)   08/12/24 1238 126 kg (278 lb)   07/18/24 1326 126 kg (278 lb)   05/20/24 1030 119 kg (263 lb)   05/17/24 1151 119 kg (263 lb)   05/17/24 1028 119 kg (263 lb)   05/03/24 0432 125 kg (275 lb 5.7 oz)   05/01/24 0438 124 kg (272 lb 14.9 oz)   04/30/24 0600 127 kg (279 lb 5.2 oz)   04/29/24 2114 126 kg (277 lb 9 oz)   04/29/24 0241 116 kg (255 lb 11.7 oz)   04/19/24 0614 116 kg (255 lb 1.2 oz)   04/18/24 0617 119 kg (262 lb 2 oz)   04/17/24 0616 115 kg (252 lb 13.9 oz)   04/16/24 0519 124 kg (272 lb 11.3 oz)   04/15/24 0617 124 kg (272 lb 14.9 oz)   04/15/24 0057 124 kg (273 lb 9.5 oz)   04/15/24 0050 124 kg (273 lb 9.5 oz)   04/14/24 1908 127 kg (279 lb 1.6 oz)   04/04/24 1042 122 kg (270 lb)   02/16/24 1458 126 kg (276 lb 10.8 oz)   01/30/24 1032 128 kg (282 lb)   01/11/24 1556 123 kg (272 lb)   12/15/23 0330 127 kg (280 lb)   12/14/23 1400 126 kg (277 lb)   12/06/23 1904 126 kg (277 lb 12.5 oz)   11/20/23 0953 126 kg (278 lb)   11/17/23 1019 126 kg (278 lb)   11/06/23 1119 126 kg (278 lb)   10/11/23 1020 126 kg (278 lb)   08/22/23 0917 126 kg (278 lb)   08/21/23 1017 129 kg (285 lb)   07/24/23 0849 129 kg (285 lb)          Wt Change Observation -15lbs/6% past 1 month; Per EMR, pt was d/c'd on  Ozempic  Wt of 243lbs on 2/8/25 used in estimating wt loss from d/t ? Accuracy of Wt of 300lbs on 2/18/25     Estimated/Assessed Needs  Estimated Needs based on: Adjusted Body Weight 79kg       Energy Requirements 25-30 kcal/kg   EST Needs (kcal/day) 1975 - 2370 kcals/d       Protein Requirements 1.2-2.0 g/kg   EST Daily Needs (g/day) 95 - 158gms/d       Fluid Requirements 1 ml/kcal    Estimated Needs (mL/day) 1975 - 2370mls/d     Labs/Medications         Pertinent Labs Reviewed. H/o Stage IIIb CKD   Results from last 7 days   Lab Units 03/18/25  0615 03/17/25  0213 03/16/25  1443   SODIUM mmol/L 138 141 140   POTASSIUM mmol/L 3.5 3.6 3.8   CHLORIDE mmol/L 101 102 101   CO2 mmol/L 29.0 27.7 28.9   BUN mg/dL 19 20 20   CREATININE mg/dL 2.01* 1.90* 2.09*   CALCIUM mg/dL 8.7 8.4* 9.2   BILIRUBIN mg/dL  --  0.2 0.2   ALK PHOS U/L  --  167* 196*   ALT (SGPT) U/L  --  29 30   AST (SGOT) U/L  --  35 34   GLUCOSE mg/dL 136* 105* 124*     Results from last 7 days   Lab Units 03/18/25  0615   HEMOGLOBIN g/dL 8.7*   HEMATOCRIT % 27.6*     COVID19   Date Value Ref Range Status   01/23/2025 Not Detected Not Detected - Ref. Range Final     Lab Results   Component Value Date    HGBA1C 7.0 (A) 03/14/2025         Pertinent Medications Reviewed.     Malnutrition Severity Assessment              Nutrition Diagnosis         Nutrition Dx Problem 1 Increased nutrient needs related to  Stage III PI & U wound  as evidenced by impaired skin integrity.     Nutrition Intervention           Current Nutrition Orders & Evaluation of Intake       Current PO Diet NPO Diet NPO Type: Strict NPO   Supplement Orders Placed This Encounter      Dietary Nutrition Supplements Rosalino; orange           Nutrition Intervention/Prescription        Cont w/ Rosalino twice daily to promote wound healing  Monitor for diet advancement and initiate high protein foods and/or supplements as appropriate  Rec daily MVI            Medical Nutrition Therapy/Nutrition  Education          Learner     Readiness Patient  Acceptance     Method     Response Explanation  Verbalizes understanding     Monitor/Evaluation        Monitor PO intake, Pertinent labs, Weight, Skin status, GI status, POC/GOC     Nutrition Discharge Plan         Recommend to continue oral nutrition supplements on discharge.  Rosalino for wounds     Electronically signed by:  Jayshree Leal RD  03/18/25 10:40 EDT

## 2025-03-19 VITALS
HEIGHT: 69 IN | SYSTOLIC BLOOD PRESSURE: 139 MMHG | TEMPERATURE: 98.1 F | HEART RATE: 70 BPM | WEIGHT: 228.84 LBS | DIASTOLIC BLOOD PRESSURE: 58 MMHG | BODY MASS INDEX: 33.89 KG/M2 | OXYGEN SATURATION: 98 % | RESPIRATION RATE: 16 BRPM

## 2025-03-19 LAB
ANION GAP SERPL CALCULATED.3IONS-SCNC: 7.7 MMOL/L (ref 5–15)
BUN SERPL-MCNC: 26 MG/DL (ref 6–20)
BUN/CREAT SERPL: 13.9 (ref 7–25)
CALCIUM SPEC-SCNC: 9.4 MG/DL (ref 8.6–10.5)
CHLORIDE SERPL-SCNC: 101 MMOL/L (ref 98–107)
CO2 SERPL-SCNC: 28.3 MMOL/L (ref 22–29)
CREAT SERPL-MCNC: 1.87 MG/DL (ref 0.76–1.27)
DEPRECATED RDW RBC AUTO: 47.8 FL (ref 37–54)
EGFRCR SERPLBLD CKD-EPI 2021: 40.9 ML/MIN/1.73
ERYTHROCYTE [DISTWIDTH] IN BLOOD BY AUTOMATED COUNT: 14.6 % (ref 12.3–15.4)
GLUCOSE BLDC GLUCOMTR-MCNC: 116 MG/DL (ref 70–99)
GLUCOSE BLDC GLUCOMTR-MCNC: 122 MG/DL (ref 70–99)
GLUCOSE BLDC GLUCOMTR-MCNC: 154 MG/DL (ref 70–99)
GLUCOSE SERPL-MCNC: 130 MG/DL (ref 65–99)
HCT VFR BLD AUTO: 30.8 % (ref 37.5–51)
HGB BLD-MCNC: 9.6 G/DL (ref 13–17.7)
MCH RBC QN AUTO: 27.8 PG (ref 26.6–33)
MCHC RBC AUTO-ENTMCNC: 31.2 G/DL (ref 31.5–35.7)
MCV RBC AUTO: 89.3 FL (ref 79–97)
PLATELET # BLD AUTO: 479 10*3/MM3 (ref 140–450)
PMV BLD AUTO: 8.6 FL (ref 6–12)
POTASSIUM SERPL-SCNC: 3.7 MMOL/L (ref 3.5–5.2)
QT INTERVAL: 337 MS
QTC INTERVAL: 457 MS
RBC # BLD AUTO: 3.45 10*6/MM3 (ref 4.14–5.8)
SODIUM SERPL-SCNC: 137 MMOL/L (ref 136–145)
WBC NRBC COR # BLD AUTO: 9.05 10*3/MM3 (ref 3.4–10.8)

## 2025-03-19 PROCEDURE — 82948 REAGENT STRIP/BLOOD GLUCOSE: CPT | Performed by: INTERNAL MEDICINE

## 2025-03-19 PROCEDURE — 94760 N-INVAS EAR/PLS OXIMETRY 1: CPT

## 2025-03-19 PROCEDURE — 94799 UNLISTED PULMONARY SVC/PX: CPT

## 2025-03-19 PROCEDURE — 85027 COMPLETE CBC AUTOMATED: CPT | Performed by: INTERNAL MEDICINE

## 2025-03-19 PROCEDURE — 63710000001 INSULIN LISPRO (HUMAN) PER 5 UNITS: Performed by: INTERNAL MEDICINE

## 2025-03-19 PROCEDURE — 99239 HOSP IP/OBS DSCHRG MGMT >30: CPT | Performed by: INTERNAL MEDICINE

## 2025-03-19 PROCEDURE — 80048 BASIC METABOLIC PNL TOTAL CA: CPT | Performed by: INTERNAL MEDICINE

## 2025-03-19 PROCEDURE — 94664 DEMO&/EVAL PT USE INHALER: CPT

## 2025-03-19 PROCEDURE — 25010000002 HEPARIN LOCK FLUSH PER 10 UNITS: Performed by: INTERNAL MEDICINE

## 2025-03-19 RX ORDER — PANTOPRAZOLE SODIUM 40 MG/1
40 TABLET, DELAYED RELEASE ORAL
Status: DISCONTINUED | OUTPATIENT
Start: 2025-03-19 | End: 2025-03-20 | Stop reason: HOSPADM

## 2025-03-19 RX ORDER — TOBRAMYCIN INHALATION SOLUTION 300 MG/5ML
300 INHALANT RESPIRATORY (INHALATION)
Start: 2025-03-27 | End: 2025-04-24

## 2025-03-19 RX ORDER — PANTOPRAZOLE SODIUM 40 MG/1
40 TABLET, DELAYED RELEASE ORAL
Qty: 30 TABLET | Refills: 1 | Status: SHIPPED | OUTPATIENT
Start: 2025-03-20

## 2025-03-19 RX ORDER — DILTIAZEM HYDROCHLORIDE 240 MG/1
240 CAPSULE, COATED, EXTENDED RELEASE ORAL
Qty: 30 CAPSULE | Refills: 0 | Status: SHIPPED | OUTPATIENT
Start: 2025-03-20 | End: 2025-04-19

## 2025-03-19 RX ORDER — HEPARIN SODIUM (PORCINE) LOCK FLUSH IV SOLN 100 UNIT/ML 100 UNIT/ML
5 SOLUTION INTRAVENOUS AS NEEDED
Status: DISCONTINUED | OUTPATIENT
Start: 2025-03-19 | End: 2025-03-20 | Stop reason: HOSPADM

## 2025-03-19 RX ADMIN — Medication 10 ML: at 08:27

## 2025-03-19 RX ADMIN — HEPARIN 500 UNITS: 100 SYRINGE at 18:11

## 2025-03-19 RX ADMIN — BUSPIRONE HYDROCHLORIDE 15 MG: 10 TABLET ORAL at 08:27

## 2025-03-19 RX ADMIN — PANTOPRAZOLE SODIUM 40 MG: 40 TABLET, DELAYED RELEASE ORAL at 08:27

## 2025-03-19 RX ADMIN — FERROUS SULFATE TAB 325 MG (65 MG ELEMENTAL FE) 325 MG: 325 (65 FE) TAB at 08:27

## 2025-03-19 RX ADMIN — Medication 473 ML: at 11:59

## 2025-03-19 RX ADMIN — WHITE PETROLATUM 1 APPLICATION: 1.75 OINTMENT TOPICAL at 14:14

## 2025-03-19 RX ADMIN — BUDESONIDE 0.5 MG: 0.5 INHALANT RESPIRATORY (INHALATION) at 19:27

## 2025-03-19 RX ADMIN — INSULIN LISPRO 2 UNITS: 100 INJECTION, SOLUTION INTRAVENOUS; SUBCUTANEOUS at 12:17

## 2025-03-19 RX ADMIN — APIXABAN 5 MG: 5 TABLET, FILM COATED ORAL at 08:27

## 2025-03-19 RX ADMIN — BUDESONIDE 0.5 MG: 0.5 INHALANT RESPIRATORY (INHALATION) at 09:31

## 2025-03-19 RX ADMIN — ARFORMOTEROL TARTRATE 15 MCG: 15 SOLUTION RESPIRATORY (INHALATION) at 09:31

## 2025-03-19 RX ADMIN — ARFORMOTEROL TARTRATE 15 MCG: 15 SOLUTION RESPIRATORY (INHALATION) at 19:26

## 2025-03-19 RX ADMIN — TAMSULOSIN HYDROCHLORIDE 0.4 MG: 0.4 CAPSULE ORAL at 08:27

## 2025-03-19 RX ADMIN — COLLAGENASE SANTYL 1 APPLICATION: 250 OINTMENT TOPICAL at 11:59

## 2025-03-19 RX ADMIN — DILTIAZEM HYDROCHLORIDE 240 MG: 240 CAPSULE, COATED, EXTENDED RELEASE ORAL at 08:27

## 2025-03-19 NOTE — SIGNIFICANT NOTE
Patient alert and agreeable to for this nurse to assess perirectal wound   This wound nurse removed numerous black fibers from wound base , multiple fibers removed, some remain and discoloration remains.   Patient tolerated well, denied discomfort.  Air mattress in place

## 2025-03-19 NOTE — CONSULTS
"Nutrition Services    Patient Name: Preston Wallis  YOB: 1965  MRN: [id number]  Admission date: 3/16/2025      CLINICAL NUTRITION ASSESSMENT      Reason for Assessment  Follow Up and Pressure Injury     H&P:  Past Medical History:   Diagnosis Date    1. Incision and drainage of posterior perianal abscess 2. Sharp excisional debridement through skin and subcutaneous tissue in the posterior perianal area, 9 x 6 x 1 cm 01/26/2025    Age-related cognitive decline     Allergic contact dermatitis     Allergies     Anemia     Bedbound     11/2023 \"MY LEG MUSCLES STOPPED WORKING\"    Bronchiectasis with acute lower respiratory infection     Charcot foot due to diabetes mellitus 09/10/2013    Chronic diastolic (congestive) heart failure     Chronic kidney disease     Chronic respiratory failure with hypoxia     Closed supracondylar fracture of femur 01/12/2022    COPD (chronic obstructive pulmonary disease)     Deep vein thrombosis (DVT) of lower extremity associated with air travel 01/13/2023    Dependence on supplemental oxygen     Eczema     Erectile dysfunction     due to organic reasons    Essential (primary) hypertension     Fracture     closed fracture of other tarsal and metatarsal bones    Fracture of proximal humerus 01/13/2023    GERD without esophagitis     High risk medication use     Hypercholesteremia     Hypomagnesemia     Infected stasis ulcer of left lower extremity 01/13/2023    Insomnia     Low back pain     Major depressive disorder     Morbid (severe) obesity due to excess calories     MRSA pneumonia     Muscle weakness     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 without evidence of necrosis     Obstructive sleep apnea (adult) (pediatric)     On home O2     REPORTS WEARING 2L/NC AAT    Other forms of dyspnea     Other long term (current) drug therapy     Other specified noninfective gastroenteritis and colitis     Other spondylosis, lumbar region  " "   Pain in both knees     Paroxysmal atrial fibrillation     Peripheral neuropathy     attributed to type 2 diabetes    Pneumonia, unspecified organism     Polyneuropathy     Rash and other nonspecific skin eruption     Self-catheterizes urinary bladder     EVERY 4 HOURS    Smoking     \"SOMETIMES\"    Syncope and collapse     Tachycardia     Tinnitus 01/13/2023    Type 1 diabetes mellitus with diabetic chronic kidney disease     Type 2 diabetes mellitus     Unspecified fall, initial encounter     Urinary retention         Current Problems:   Active Hospital Problems    Diagnosis     **GI bleed         Nutrition/Diet History         Narrative   3/19: Had EGD w/ biopsies on 3/18. Diet advanced today to Cardiac/Reg Consistency/Thin Liquids  Cont on Rosalino twice daily for wound healing    3/18: Pt resting in bed. Currently NPO for pending endoscopy, on Clear Liquids at admit on 3/16.  Admitted for coffed ground emesis. Admitted from LTC facility  Recent extended hospitalization here from 1/23/25 - 2/20/2025 and had a colostomy placed to help w/ wound healing by diverting stool away from wound  Followed by Wound Care for multiple wounds including Stage III to coccyx and Unstageable on Left Foot     Anthropometrics        Current Height, Weight Height: 175.3 cm (69\")  Weight: 104 kg (228 lb 13.4 oz)   Current BMI Body mass index is 33.79 kg/m².   BMI Classification Obese Class I   % % IBW   Adjusted Body Weight (ABW) 174lbs/79kg   Weight Hx  Wt Readings from Last 30 Encounters:   03/16/25 1431 104 kg (228 lb 13.4 oz)   03/14/25 1100 103 kg (228 lb)   03/10/25 1132 134 kg (296 lb)   02/20/25 0608 135 kg (296 lb 11.8 oz)   02/19/25 1210 (!) 136 kg (300 lb 4.3 oz)   02/18/25 0427 (!) 136 kg (300 lb 11.3 oz)   02/15/25 0330 101 kg (222 lb 7.1 oz)   02/14/25 0536 102 kg (224 lb 13.9 oz)   02/13/25 0401 104 kg (230 lb 6.1 oz)   02/12/25 0215 106 kg (233 lb 7.5 oz)   02/11/25 0327 108 kg (238 lb 1.6 oz)   02/10/25 0532 111 " kg (244 lb 4.3 oz)   02/09/25 0300 111 kg (245 lb 6 oz)   02/08/25 0530 110 kg (243 lb 2.7 oz)   02/07/25 0700 109 kg (239 lb 3.2 oz)   02/06/25 0500 109 kg (239 lb 6.7 oz)   02/05/25 0500 107 kg (236 lb 15.9 oz)   02/04/25 0437 110 kg (241 lb 13.5 oz)   02/03/25 0500 112 kg (247 lb 2.2 oz)   02/02/25 0500 118 kg (260 lb 2.3 oz)   02/01/25 0500 120 kg (263 lb 14.3 oz)   01/31/25 0500 121 kg (265 lb 10.5 oz)   01/30/25 0500 120 kg (263 lb 10.7 oz)   01/29/25 0500 123 kg (271 lb 2.7 oz)   01/27/25 0500 118 kg (260 lb 2.3 oz)   01/26/25 0308 113 kg (248 lb 14.4 oz)   01/25/25 0429 111 kg (245 lb 2.4 oz)   01/24/25 0924 111 kg (244 lb 0.8 oz)   01/23/25 1555 123 kg (270 lb 4.5 oz)   12/12/24 1050 109 kg (241 lb)   12/11/24 1048 109 kg (241 lb)   12/05/24 1155 111 kg (244 lb)   10/30/24 1004 111 kg (245 lb)   08/22/24 1439 109 kg (241 lb)   08/15/24 1325 112 kg (247 lb 9.2 oz)   08/12/24 1238 126 kg (278 lb)   07/18/24 1326 126 kg (278 lb)   05/20/24 1030 119 kg (263 lb)   05/17/24 1151 119 kg (263 lb)   05/17/24 1028 119 kg (263 lb)   05/03/24 0432 125 kg (275 lb 5.7 oz)   05/01/24 0438 124 kg (272 lb 14.9 oz)   04/30/24 0600 127 kg (279 lb 5.2 oz)   04/29/24 2114 126 kg (277 lb 9 oz)   04/29/24 0241 116 kg (255 lb 11.7 oz)   04/19/24 0614 116 kg (255 lb 1.2 oz)   04/18/24 0617 119 kg (262 lb 2 oz)   04/17/24 0616 115 kg (252 lb 13.9 oz)   04/16/24 0519 124 kg (272 lb 11.3 oz)   04/15/24 0617 124 kg (272 lb 14.9 oz)   04/15/24 0057 124 kg (273 lb 9.5 oz)   04/15/24 0050 124 kg (273 lb 9.5 oz)   04/14/24 1908 127 kg (279 lb 1.6 oz)   04/04/24 1042 122 kg (270 lb)   02/16/24 1458 126 kg (276 lb 10.8 oz)   01/30/24 1032 128 kg (282 lb)   01/11/24 1556 123 kg (272 lb)   12/15/23 0330 127 kg (280 lb)   12/14/23 1400 126 kg (277 lb)   12/06/23 1904 126 kg (277 lb 12.5 oz)   11/20/23 0953 126 kg (278 lb)   11/17/23 1019 126 kg (278 lb)   11/06/23 1119 126 kg (278 lb)   10/11/23 1020 126 kg (278 lb)   08/22/23 0917 126 kg (278  lb)   08/21/23 1017 129 kg (285 lb)   07/24/23 0849 129 kg (285 lb)          Wt Change Observation -15lbs/6% past 1 month; Per EMR, pt was d/c'd on Ozempic  Wt of 243lbs on 2/8/25 used in estimating wt loss from d/t ? Accuracy of Wt of 300lbs on 2/18/25     Estimated/Assessed Needs  Estimated Needs based on: Adjusted Body Weight 79kg       Energy Requirements 25-30 kcal/kg   EST Needs (kcal/day) 1975 - 2370 kcals/d       Protein Requirements 1.2-2.0 g/kg   EST Daily Needs (g/day) 95 - 158gms/d       Fluid Requirements 1 ml/kcal    Estimated Needs (mL/day) 1975 - 2370mls/d     Labs/Medications         Pertinent Labs Reviewed. H/o Stage IIIb CKD   Results from last 7 days   Lab Units 03/19/25  0534 03/18/25  0615 03/17/25  0213 03/16/25  1443   SODIUM mmol/L 137 138 141 140   POTASSIUM mmol/L 3.7 3.5 3.6 3.8   CHLORIDE mmol/L 101 101 102 101   CO2 mmol/L 28.3 29.0 27.7 28.9   BUN mg/dL 26* 19 20 20   CREATININE mg/dL 1.87* 2.01* 1.90* 2.09*   CALCIUM mg/dL 9.4 8.7 8.4* 9.2   BILIRUBIN mg/dL  --   --  0.2 0.2   ALK PHOS U/L  --   --  167* 196*   ALT (SGPT) U/L  --   --  29 30   AST (SGOT) U/L  --   --  35 34   GLUCOSE mg/dL 130* 136* 105* 124*     Results from last 7 days   Lab Units 03/19/25  0534   HEMOGLOBIN g/dL 9.6*   HEMATOCRIT % 30.8*     COVID19   Date Value Ref Range Status   01/23/2025 Not Detected Not Detected - Ref. Range Final     Lab Results   Component Value Date    HGBA1C 7.0 (A) 03/14/2025         Pertinent Medications Reviewed.     Malnutrition Severity Assessment              Nutrition Diagnosis         Nutrition Dx Problem 1 Increased nutrient needs related to  Stage III PI & U wound  as evidenced by impaired skin integrity.     Nutrition Intervention           Current Nutrition Orders & Evaluation of Intake       Current PO Diet Diet: Cardiac, Diabetic; Healthy Heart (2-3 Na+); Consistent Carbohydrate; Fluid Consistency: Thin (IDDSI 0)   Supplement Orders Placed This Encounter      Dietary  Nutrition Supplements Rosalino; orange           Nutrition Intervention/Prescription        Cont w/ Rosalino twice daily to promote wound healing  Add Boost GC once daily w/ dinner to provide ~190kcals, 16gms prot  Add High Protein w/ Meals d/t increased needs for wound healing  Rec daily MVI            Medical Nutrition Therapy/Nutrition Education          Learner     Readiness Patient  Acceptance     Method     Response Explanation  Verbalizes understanding     Monitor/Evaluation        Monitor PO intake, Pertinent labs, Weight, Skin status, GI status, POC/GOC     Nutrition Discharge Plan         Recommend to continue oral nutrition supplements on discharge.  Rosalino for wounds     Electronically signed by:  Jayshree Leal RD  03/19/25 12:31 EDT

## 2025-03-19 NOTE — PLAN OF CARE
Goal Outcome Evaluation:  Plan of Care Reviewed With: patient        Progress: improving  Outcome Evaluation: Patient is alert and oriented. VSS. No complaints of pain this shift. Seen by wound care RN. Patient is planning for discharge home. Will continue with POC.

## 2025-03-19 NOTE — ANESTHESIA POSTPROCEDURE EVALUATION
Patient: Preston Wallis    Procedure Summary       Date: 03/18/25 Room / Location: [facility] ENDOSCOPY 3 / [facility] ENDOSCOPY    Anesthesia Start: 1649 Anesthesia Stop: 1703    Procedure: ESOPHAGOGASTRODUODENOSCOPY WITH BIOPSIES Diagnosis:       Gastrointestinal hemorrhage, unspecified gastrointestinal hemorrhage type      (Gastrointestinal hemorrhage, unspecified gastrointestinal hemorrhage type [K92.2])    Surgeons: Rafael Hernandez MD Provider: Akash Thayer CRNA    Anesthesia Type: general ASA Status: 4            Anesthesia Type: general    Vitals  Vitals Value Taken Time   /65 03/18/25 17:23   Temp 36.9 °C (98.4 °F) 03/18/25 17:22   Pulse 94 03/18/25 17:25   Resp 22 03/18/25 17:22   SpO2 99 % 03/18/25 17:25   Vitals shown include unfiled device data.        Post Anesthesia Care and Evaluation    Post-procedure mental status: acceptable.  Pain management: satisfactory to patient    Airway patency: patent  Anesthetic complications: No anesthetic complications    Cardiovascular status: acceptable  Respiratory status: acceptable    Comments: Per chart review

## 2025-03-19 NOTE — DISCHARGE SUMMARY
[facility]         HOSPITALIST  DISCHARGE SUMMARY    Patient Name: Preston Wallis  : 1965  MRN: [id number]    Date of Admission: 3/16/2025  Date of Discharge:  25  Primary Care Physician: Kimmy Riley MD    Consults       Date and Time Order Name Status Description    3/19/2025  9:47 AM Inpatient General Surgery Consult      3/16/2025  7:05 PM Inpatient Gastroenterology Consult Completed     3/16/2025  4:38 PM Inpatient Hospitalist Consult      3/16/2025  4:37 PM Gastroenterology (on-call MD unless specified)      2025 10:25 AM Inpatient Nephrology Consult Completed             Active and Resolved Hospital Problems:  Coffee ground emesis  Esophagitis without bleeding  Chronic anemia  Essential HTN  Atrial fibrillation on Eliquis  Type 2 diabetes  COPD on chronic oxygen 2 L/min  History of MDR Pseudomonas pneumonia  History of perianal abscess status post diverting colostomy  Thrombocytosis  CKD stage IIIb  BPH, chronic intermittent catheterization use outpatient   Chronic anxiety    Hospital Course     Hospital Course:  Preston Wallis is a 59 y.o. male with COPD on 2 L oxygen, MILADY, hypertension, atrial fibrillation on Eliquis, diabetes, Pseudomonas pneumonia who presented from nursing facility with coffee-ground emesis.  On presentation tachycardic and hypertensive. Lactate 1.2. Hemoglobin 9.2 which is near baseline.  Placed on IV fluids, IV PPI.  Eliquis and aspirin held.Recent ferritin 608, iron 31, TSAT 17%; was already on oral iron.  GI consulted.  Underwent EGD which showed esophagitis with no bleeding.  Biopsies were taken, pathology pending.  Hemoglobin remained stable.  Diet advanced without issue.  Eliquis restarted.  Will continue Protonix and follow-up with gastroenterology for repeat EGD.   Of note has chronic perianal wound, seen by general surgery, no debridement warranted, seen by wound care RN, underwent dressing changes, patient did not want to go back to  facility, did not need wound VAC placed at this time, home health set up to assist with wound care until he can be seen in wound care clinic.  Follow-up with multiple specialists scheduled.  Discharged home in stable condition      Esophageal biopsy pathology pending  Tobramycin nebs to be restarted 3/27.  1 month on 1 month off cycle      Day of Discharge     Vital Signs:  Temp:  [97.9 °F (36.6 °C)-98.6 °F (37 °C)] 98.1 °F (36.7 °C)  Heart Rate:  [85-94] 93  Resp:  [17-22] 18  BP: (107-165)/(52-77) 134/70  Flow (L/min) (Oxygen Therapy):  [2-9] 2  Physical Exam:   GENERAL: Conversant and nontoxic.  HEART: RRR. No edema  LUNGS: nonlabored  ABDOMEN: Soft, nd, LLQ ostomy  NEUROLOGIC: Alert, CN intact  Skin: left gluteal wound, POA        Discharge Details        Discharge Medications        New Medications        Instructions Start Date   dilTIAZem  MG 24 hr capsule  Commonly known as: CARDIZEM CD   240 mg, Oral, Every 24 Hours Scheduled   Start Date: March 20, 2025     pantoprazole 40 MG EC tablet  Commonly known as: PROTONIX   40 mg, Oral, Every Early Morning   Start Date: March 20, 2025            Changes to Medications        Instructions Start Date   tobramycin  MG/5ML nebulizer solution  Commonly known as: MOIZ  What changed: These instructions start on March 27, 2025. If you are unsure what to do until then, ask your doctor or other care provider.   300 mg, Nebulization, 2 Times Daily - RT, nebulize 1 vial BY MOUTH TWICE DAILY FOR 28 DAYS ON AND 28 DAYS OFF   Start Date: March 27, 2025            Continue These Medications        Instructions Start Date   Anasept 0.057 % liquid  Generic drug: Sodium Hypochlorite   1 Application, Daily      Anasept 0.057 % liquid  Generic drug: Sodium Hypochlorite   1 Application, As Needed      arformoterol 15 MCG/2ML nebulizer solution  Commonly known as: BROVANA   15 mcg, Nebulization, 2 Times Daily - RT      Aspirin Low Dose 81 MG EC tablet  Generic drug:  aspirin   81 mg, Oral, Every Morning      atorvastatin 20 MG tablet  Commonly known as: LIPITOR   20 mg, Oral, Every Night at Bedtime      Betadine 5 % external solution 5%  Generic drug: Povidone-Iodine   1 Application, 2 Times Daily      budesonide 0.5 MG/2ML nebulizer solution  Commonly known as: Pulmicort   0.5 mg, Nebulization, 2 Times Daily      busPIRone 15 MG tablet  Commonly known as: BUSPAR   15 mg, Oral, 2 Times Daily      calcium citrate 950 (200 Ca) MG tablet  Commonly known as: CALCITRATE   950 mg, Oral, Every Night at Bedtime      docusate sodium 100 MG capsule  Commonly known as: COLACE   100 mg, Oral, 2 Times Daily      Eliquis 5 MG tablet tablet  Generic drug: apixaban   5 mg, Oral, Every 12 Hours      Farxiga 5 MG tablet tablet  Generic drug: dapagliflozin   5 mg, Oral, Every Morning      ferrous gluconate 324 MG tablet  Commonly known as: FERGON   324 mg, Oral, Every Morning      finasteride 5 MG tablet  Commonly known as: PROSCAR   5 mg, Oral, Every Night at Bedtime      folic acid 1 MG tablet  Commonly known as: FOLVITE   1,000 mcg, Oral, Every Night at Bedtime      GNP One Daily Plus Iron tablet   1 tablet, Oral, Every Morning      Insulin Lispro (1 Unit Dial) 100 UNIT/ML solution pen-injector  Commonly known as: HUMALOG   5 Units, Subcutaneous, 3 Times Daily Before Meals      ipratropium-albuterol 0.5-2.5 mg/3 ml nebulizer  Commonly known as: DUO-NEB   3 mL, Nebulization, Every 4 Hours PRN      Levemir 100 UNIT/ML injection  Generic drug: insulin detemir   5 Units, Subcutaneous, Nightly      Magnesium Oxide -Mg Supplement 400 (240 Mg) MG tablet   1 tablet, Every Night at Bedtime      montelukast 10 MG tablet  Commonly known as: SINGULAIR   10 mg, Oral, Nightly      Ozempic (1 MG/DOSE) 4 MG/3ML solution pen-injector  Generic drug: Semaglutide (1 MG/DOSE)   1 mg, Subcutaneous, Weekly      tamsulosin 0.4 MG capsule 24 hr capsule  Commonly known as: FLOMAX   0.4 mg, Oral, Every Night at Bedtime       tiotropium 18 MCG per inhalation capsule  Commonly known as: SPIRIVA   1 capsule, Inhalation, Daily - RT      vitamin C 500 MG tablet  Commonly known as: ASCORBIC ACID   500 mg, Oral, 2 Times Daily      Vitamin D3 50 MCG (2000 UT) tablet   50 mcg, Oral, Every Morning      Zinc-220 220 (50 Zn) MG capsule  Generic drug: zinc sulfate   220 mg, Daily             Stop These Medications      famotidine 40 MG tablet  Commonly known as: PEPCID              Allergies   Allergen Reactions    Benadryl [Diphenhydramine] Itching    Proventil [Albuterol] Other (See Comments)     Mouth sores         Discharge Disposition:  Home-Health Care [facility]    Diet:  Hospital:  Diet Order   Procedures    Diet: Cardiac, Diabetic, High Protein; Healthy Heart (2-3 Na+); Consistent Carbohydrate; Fluid Consistency: Thin (IDDSI 0)       Discharge Activity:   Activity Instructions       Activity as Tolerated              CODE STATUS:  Code Status and Medical Interventions: CPR (Attempt to Resuscitate); Full Support   Ordered at: 03/16/25 1718     Code Status (Patient has no pulse and is not breathing):    CPR (Attempt to Resuscitate)     Medical Interventions (Patient has pulse or is breathing):    Full Support         Future Appointments   Date Time Provider Department Center   3/24/2025  9:30 AM Emerson Canada MD [facility]   3/31/2025 10:00 AM Kyra Natarajan [facility]   4/24/2025 10:00 AM Kimmy Riley MD [facility] BARDS [facility]   6/27/2025 11:20 AM Neli Paredes [facility] BT [facility]   6/30/2025  9:30 AM Betzaida Nelson [facility]ITY [facility]       Additional Instructions for the Follow-ups that You Need to Schedule       Ambulatory Referral to Wound Clinic   As directed      Discharge Follow-up with PCP   As directed       Currently Documented PCP:    Kimmy Riley MD    PCP Phone Number:    [phone]     Follow Up Details: 1-2 weeks        Discharge Follow-up with Specified Provider: Dr Hernandez; 1 Month    As directed      To: Dr Hernandez   Follow Up: 1 Month        Discharge Follow-up with Specified Provider: Betzaida Nelson APRN; 1 Month   As directed      To: Betzaida Nelson APRN   Follow Up: 1 Month   Follow Up Details: Pulmonology                Pertinent  and/or Most Recent Results     PROCEDURES:     EGD  - A medium-sized hiatal hernia was present. Findings: - LA Grade C (one or more mucosal breaks continuous between tops of 2 or more mucosal folds, less than 75% circumference) esophagitis with no bleeding was found in the lower third of the esophagus. Biopsies were taken with a cold forceps for histology. Possibilty of underlying Barretts. - The entire examined stomach was normal. - The first portion of the duodenum and second portion of the duodenum were kimberlyn    LAB RESULTS:      Lab 03/19/25  0534 03/18/25  0615 03/17/25  1709 03/17/25  0748 03/17/25  0003 03/16/25  1917 03/16/25  1443   WBC 9.05 10.77  --   --   --   --  11.72*   HEMOGLOBIN 9.6* 8.7* 9.4* 8.2* 8.3*  --  9.2*   HEMATOCRIT 30.8* 27.6* 30.4* 27.0* 27.2*  --  29.9*   PLATELETS 479* 437  --   --   --   --  516*   NEUTROS ABS  --   --   --   --   --   --  8.50*   IMMATURE GRANS (ABS)  --   --   --   --   --   --  0.04   LYMPHS ABS  --   --   --   --   --   --  1.66   MONOS ABS  --   --   --   --   --   --  0.89   EOS ABS  --   --   --   --   --   --  0.56*   MCV 89.3 89.6  --   --   --   --  89.3   LACTATE  --   --   --   --   --   --  1.2   PROTIME  --   --   --   --   --   --  16.5*   APTT  --   --   --   --   --  37.2*  --          Lab 03/19/25  0534 03/18/25  0615 03/17/25  0213 03/16/25  1443 03/14/25  1116   SODIUM 137 138 141 140  --    POTASSIUM 3.7 3.5 3.6 3.8  --    CHLORIDE 101 101 102 101  --    CO2 28.3 29.0 27.7 28.9  --    ANION GAP 7.7 8.0 11.3 10.1  --    BUN 26* 19 20 20  --    CREATININE 1.87* 2.01* 1.90* 2.09*  --    EGFR 40.9* 37.5* 40.1* 35.8*  --    GLUCOSE 130* 136* 105* 124*  --    CALCIUM 9.4 8.7 8.4* 9.2  --     HEMOGLOBIN A1C  --   --   --   --  7.0*         Lab 03/17/25  0213 03/16/25  1443   TOTAL PROTEIN 6.7 7.3   ALBUMIN 2.6* 2.9*   GLOBULIN 4.1 4.4   ALT (SGPT) 29 30   AST (SGOT) 35 34   BILIRUBIN 0.2 0.2   ALK PHOS 167* 196*   LIPASE  --  7*         Lab 03/16/25  1555 03/16/25  1443   PROBNP  --  1,518.0*   HSTROP T 125* 132*   PROTIME  --  16.5*   INR  --  1.27*             Lab 03/16/25  1917   ABO TYPING O   RH TYPING Negative   ANTIBODY SCREEN Negative         Brief Urine Lab Results  (Last result in the past 365 days)        Color   Clarity   Blood   Leuk Est   Nitrite   Protein   CREAT   Urine HCG        03/16/25 1604 Yellow   Clear   Moderate (2+)   Trace   Negative   >=300 mg/dL (3+)                 Microbiology Results (last 10 days)       Procedure Component Value - Date/Time    CANDIDA AURIS PCR - Swab, Axilla Right, Axilla Left and Groin [[id number]]  (Normal) Collected: 03/17/25 0718    Lab Status: Final result Specimen: Swab from Axilla Right, Axilla Left and Groin Updated: 03/18/25 1128     NEGRO AURIS PCR Not Detected            CT Abdomen Pelvis Without Contrast  Result Date: 3/16/2025  Impression: No acute findings in the abdomen/pelvis. Status post diverting colostomy. Mild fat stranding at the colostomy, likely postsurgical change. No evidence of bowel obstruction. Interval umbilical hernia repair with mild amount of underlying fat stranding/inflammatory change, favored postsurgical. Mildly decreased tree-in-bud nodular opacities in the left lower lobe since 1/24/2025, though these are partially imaged and would recommend referring to prior chest CT report for further recommendations. Electronically Signed: Farshad Soriano  3/16/2025 3:55 PM EDT  Workstation ID: [id number]    XR Chest 1 View  Result Date: 3/16/2025  Impression: 1.Coarse bilateral interstitial markings, which could reflect interstitial pulmonary edema or atypical pneumonia. 2.Small right pleural effusion. Electronically Signed:  Fransico Truong MD  3/16/2025 3:32 PM EDT  Workstation ID: [id number]       Results for orders placed during the hospital encounter of 04/14/24    Doppler Arterial Multi Level Lower Extremity - Bilateral CAR    Interpretation Summary    Right Conclusion: The right KEYLA is unable to be assessed due to vessel incompressibility. Normal digital pressures.    Left Conclusion: The left KEYLA is unable to be assessed due to vessel incompressibility. Unable to assess digital ischemia.    Nonocclusive, poorly compressible tibial level disease bilaterally.  Normal waveforms are present through the ankle levels bilaterally.      Results for orders placed during the hospital encounter of 04/14/24    Doppler Arterial Multi Level Lower Extremity - Bilateral CAR    Interpretation Summary    Right Conclusion: The right KEYLA is unable to be assessed due to vessel incompressibility. Normal digital pressures.    Left Conclusion: The left KEYLA is unable to be assessed due to vessel incompressibility. Unable to assess digital ischemia.    Nonocclusive, poorly compressible tibial level disease bilaterally.  Normal waveforms are present through the ankle levels bilaterally.      Results for orders placed during the hospital encounter of 01/23/25    Adult Transthoracic Echo Complete W/ Cont if Necessary Per Protocol    Interpretation Summary    Left ventricular systolic function is low normal. Calculated left ventricular EF = 49.4%    Left ventricular wall thickness is consistent with moderate concentric hypertrophy.    Left ventricular diastolic function is consistent with (grade I) impaired relaxation.    There is a small (<1cm) pericardial effusion adjacent to the right ventricle.      Labs Pending at Discharge:  Pending Labs       Order Current Status    Tissue Pathology Exam In process              Time spent on Discharge including face to face service:  >30 minutes    Electronically signed by Leonard Multani DO, 03/19/25, 3:38 PM EDT.       service:  >30 minutes    Electronically signed by Leonard Multani DO, 03/19/25, 3:38 PM EDT.

## 2025-03-19 NOTE — SIGNIFICANT NOTE
" Wound Eval / Progress Noted    YAIMA Stockton     Patient Name: Preston Wallis  : 1965  MRN: [id number]  Today's Date: 3/19/2025                 Admit Date: 3/16/2025    Visit Dx:    ICD-10-CM ICD-9-CM   1. Gastrointestinal hemorrhage, unspecified gastrointestinal hemorrhage type  K92.2 578.9   2. Decreased activities of daily living (ADL)  Z78.9 V49.89   3. Difficulty walking  R26.2 719.7   4. Inability to walk  R26.2 719.7   5. Colostomy status  Z93.3 V44.3   6. Wound of gluteal cleft, unspecified laterality, sequela  S31.809S 906.0         GI bleed        Past Medical History:   Diagnosis Date    1. Incision and drainage of posterior perianal abscess 2. Sharp excisional debridement through skin and subcutaneous tissue in the posterior perianal area, 9 x 6 x 1 cm 2025    Age-related cognitive decline     Allergic contact dermatitis     Allergies     Anemia     Bedbound     2023 \"MY LEG MUSCLES STOPPED WORKING\"    Bronchiectasis with acute lower respiratory infection     Charcot foot due to diabetes mellitus 09/10/2013    Chronic diastolic (congestive) heart failure     Chronic kidney disease     Chronic respiratory failure with hypoxia     Closed supracondylar fracture of femur 2022    COPD (chronic obstructive pulmonary disease)     Deep vein thrombosis (DVT) of lower extremity associated with air travel 2023    Dependence on supplemental oxygen     Eczema     Erectile dysfunction     due to organic reasons    Essential (primary) hypertension     Fracture     closed fracture of other tarsal and metatarsal bones    Fracture of proximal humerus 2023    GERD without esophagitis     High risk medication use     Hypercholesteremia     Hypomagnesemia     Infected stasis ulcer of left lower extremity 2023    Insomnia     Low back pain     Major depressive disorder     Morbid (severe) obesity due to excess calories     MRSA pneumonia     Muscle weakness     Non-pressure chronic ulcer of " "other part of unspecified foot with bone involvement without evidence of necrosis     Obstructive sleep apnea (adult) (pediatric)     On home O2     REPORTS WEARING 2L/NC AAT    Other forms of dyspnea     Other long term (current) drug therapy     Other specified noninfective gastroenteritis and colitis     Other spondylosis, lumbar region     Pain in both knees     Paroxysmal atrial fibrillation     Peripheral neuropathy     attributed to type 2 diabetes    Pneumonia, unspecified organism     Polyneuropathy     Rash and other nonspecific skin eruption     Self-catheterizes urinary bladder     EVERY 4 HOURS    Smoking     \"SOMETIMES\"    Syncope and collapse     Tachycardia     Tinnitus 01/13/2023    Type 1 diabetes mellitus with diabetic chronic kidney disease     Type 2 diabetes mellitus     Unspecified fall, initial encounter     Urinary retention         Past Surgical History:   Procedure Laterality Date    BRONCHOSCOPY N/A 1/28/2025    Procedure: BRONCHOSCOPY: BAL: insertion of lighted instrument to view inside the lung;  Surgeon: Walter Nicole DO;  Location: [facility] MAIN OR;  Service: Pulmonary;  Laterality: N/A;    BRONCHOSCOPY N/A 2/3/2025    Procedure: BRONCHOSCOPY WITH BRONCHOALVEOLAR LAVAGE, POSSIBLE BIOPSY, BRUSHING, WASHING, AIRWAY INSPECTION: insertion of lighted instrument to view inside the lung;  Surgeon: Chadd Swan MD;  Location: [facility] MAIN OR;  Service: Pulmonary;  Laterality: N/A;    CARDIAC CATHETERIZATION Left 8/15/2024    Procedure: Carbon dioxide aortogram with left leg angiogram, possible angioplasty or stenting;  Surgeon: Moshe Willson MD;  Location: [facility] CATH INVASIVE LOCATION;  Service: Vascular;  Laterality: Left;    CHOLECYSTECTOMY      COLOSTOMY N/A 2/6/2025    Procedure: COLOSTOMY LAPAROSCOPIC; plain text: creation of colostomy using small incisions;  Surgeon: Emerson Canada MD;  Location: [facility] OR OSC;  Service: General;  Laterality: N/A;    CYSTOSCOPY      " "FEMUR SURGERY Left     Shravan placed    INCISION AND DRAINAGE ABSCESS N/A 1/26/2025    Procedure: INCISION AND DRAINAGE ABSCESS; plain text: incision and drain pus from buttocks wound;  Surgeon: Emerson Canada MD;  [facility]: [facility];  Service: General;  Laterality: N/A;    KNEE SURGERY Left     OTHER SURGICAL HISTORY Left     venous port, REMOVED    PORTACATH PLACEMENT Right     TIBIAL PLATEAU OPEN REDUCTION INTERNAL FIXATION Left 12/22/2023    Procedure: TIBIAL PLATEAU OPEN REDUCTION INTERNAL FIXATION;  Surgeon: Hugo Kline MD;  Location: [facility] OR;  Service: Orthopedics;  Laterality: Left;    TONSILLECTOMY AND ADENOIDECTOMY           Physical Assessment:     03/19/25 1201   Wound 01/23/25 2330 coccyx pressure injury   Placement Date/Time: 01/23/25 2330   Location: coccyx  Primary Wound Type: Pressure Injury  Type: pressure injury  Present on Original Admission: Yes   Wound Image      Pressure Injury Stage 3   Dressing Appearance dry;intact   Closure None   Base moist;red;maroon/purple;white;other (see comments)  (black frayed wound VAC fibers noted within area of maroon coloration to medial and proximal aspects of wound base)   Periwound dry;pink   Periwound Temperature warm   Periwound Skin Turgor soft   Edges open   Wound Length (cm) 7 cm   Wound Width (cm) 3.9 cm   Wound Depth (cm) 0.4 cm   Wound Surface Area (cm^2) 21.44 cm^2   Wound Volume (cm^3) 5.718 cm^3   Drainage Characteristics/Odor serosanguineous   Drainage Amount scant   Care, Wound cleansed with;sterile normal saline   Dressing Care dressing removed;dressing applied;other (see comments);silicone border foam  (wound base covered with 1/8th strength Dakin's moistened 2\" gauze roll)   Periwound Care absorptive dressing applied   Wound 02/16/24 1700 Left posterior foot Pressure Injury   Placement Date/Time: 02/16/24 1700   Side: Left  Orientation: posterior  Location: foot  Primary Wound Type: Pressure Injury   Wound Image   " "  Pressure Injury Stage U   Dressing Appearance dry;intact   Closure None   Base moist;pink;red;yellow;tan;slough;dry;black;eschar   Periwound dry;redness;pink   Periwound Temperature warm   Periwound Skin Turgor soft   Edges open   Wound Length (cm) 3.2 cm   Wound Width (cm) 3.5 cm   Wound Depth (cm) 0.4 cm   Wound Surface Area (cm^2) 8.8 cm^2   Wound Volume (cm^3) 2.346 cm^3   Drainage Characteristics/Odor creamy   Drainage Amount scant   Care, Wound cleansed with;sterile normal saline;collagenase agent applied   Dressing Care dressing removed;dressing applied;other (see comments);skin barrier agent applied;silicone border foam  (Nickel thick layer of santyl applied over wound base and covered with 1/8th strength dakin's moistened 2\" gauze roll.)   Periwound Care absorptive dressing applied   Colostomy   Placement date: If unknown, DO NOT use \"Add Comment\" note/Placement time: If unknown, DO NOT use \"Add Comment\" note: 02/06/25 1322   Hand Hygiene Completed: Yes   Wound Image    Stomal Appliance Clean;Dry;Intact;2 piece;Drainable;Changed;1 piece   Stoma Appearance irregular;moist;red;protruding above skin level;mucocutaneous separation   Peristomal Assessment Red;Other (Comment)  (Mucocutaneous separation noted from 12-4 o'clock with moist red tissue. Scattered areas of erosion noted from 7-11 o'clock with moist red tissue and green slough.)   Accessories/Skin Care cleansed with water;skin sealant;other (see comments);skin barrier ring  (Silver impregnated Hydrofiber applied over areas of erosion and covered with a strip of hydrocolloid dressing)   Stoma Function stool   Stool Color brown   Stool Consistency liquid;formed   Treatment Bag change;Site care;Placement checked   Output (mL) 275 mL   Stoma measures: 35mm x 35mm     Left Hip    Abbreviated Note     Wound Check / Follow-up:  Patient seen today for wound follow up.  Patient is well-known to wound care RN from previous admission. Patient underwent an " incision and drainage of perianal abscess on 1/26/2025. Patient underwent diverting laprascopic colostomy placement on 2/6/25 to assist with healing of his perianal abscess. Patient reports he has been bedridden for approximately 2 to 3 years due to the pressure injury to his left heel.  Patient initially discharged home with transition to home wound VAC unit on 2/20/2025.  Patient reports he was home for 3 days before he had to be admitted to a rehab facility, due to inability to get into chair. Patient reports his difficulty transferring was related to the wound VAC tubing. Initial plan was to resume negative pressure wound VAC therapy after removal of frayed black wound VAC fibers embedded into wound base; however, patient reports and his wife confirms they feel they will be able to care for him at home if he does not have a wound VAC on. General Surgeon rounded on patient earlier in the shift and reports no indication for surgical debridement at this time. General Surgeon recommends continuing with wet-to-dry dressing changes. Discussed with Attending MD with bedside. Current plan is for patient to discharge home with home health to assist with figueroa-stomal breakdown to colostomy and wound care, and for patient to be seen at the wound clinic. Indwelling urinary catheter in place at time of assessment.  Patient is on a specialty air mattress and has nutritional supplements ordered. Patient's wife was present throughout entire assessment and education was provided for all care provided. Patient's wife verbalized understanding and reports she feels she will be able to perform wound care and ostomy management. Provided patient's wife with supplies to perform wound care/dressing changes and ostomy pouching system changes.     Please see wound assessments above:     Stage III pressure injury noted to perirectal region.  Intermittent frayed black wound VAC fibers noted to proximal and medial aspect of wound base within  area of maroon coloration. Removed scattered wound VAC fibers from wound base as able; however, additional fibers remain too tightly adhered to remove at this time. Wound base presents with moist red and white tissue surrounding areas of maroon coloration. Recommending to decrease BID dressing changes to daily dressing changes with wound base being covered with 1/8 strength Dakin's moistened fluffed gauze roll, and secured with silicone border dressing.  Implement every 2 hour turns and offload at all times.  Keep patient clean, dry, and free from all moisture.      Chronic unstageable pressure injury to left heel.  Wound base appears to have softened since previous assessment with less black eschar noted and more yellow slough. Scattered areas of moist red/pink tissue noted as well. Applied skin prep to periwound tissue to assist with maceration prevention. Recommending to continue daily dressing changes with a nickel thick layer of Santyl being applied over wound base, covered with 1/8 strength Dakin's moistened fluffed gauze roll, and secured with a silicone border dressing.  Recommending heels remain floated at all times.  Recommending application of blue top moisture barrier right heel 4 times a day.      Colostomy noted to left lower quadrant. Removed existing ostomy pouch with adhesive remover spray.  Cleansed stoma and peristomal tissue with warm water and washcloths, blotted dry.  Stoma is noted to be irregular, moist, red, and protruding above skin level.  Mucocutaneous separation noted from 12-4 o'clock is less pronounced at this time, with moist red tissue still noted within.  Scattered erosion noted from 7-11 o'clock with moist red tissue and green slough.  Additional peristomal tissue with redness noted. Stoma measures 35 mm x 35 mm.  Applied Skin-Prep to periwound tissue.  Applied silver impregnated Hydrofiber over areas of erosion and secured with a strip of hydrocolloid dressing. A piece of Vanita's  ring was then applied over edge of hydrocolloid adjacent to stoma. Cut to fit and applied a new 1 piece flat Coloplast pouch to prepped site.  Seal obtained with no lifting or leaking.  Recommending diligent ostomy care.     Impression: Stage III pressure injury to perirectal region.  Chronic unstageable pressure injury to left heel.  Existing colostomy to left lower quadrant with peristomal breakdown.     Short term goals:  Regain skin integrity, skin protection, moisture prevention, pressure reduction, quality skin care and hygiene, daily dressing changes, ostomy management.    Amparo Novoa RN    3/19/2025    18:59 EDT

## 2025-03-19 NOTE — CONSULTS
"[facility]   Consult    Patient Name: Preston Wallis  : 1965  MRN: [id number]  Primary Care Physician:  Kimmy Riley MD  Date of admission: 3/16/2025    Subjective   Subjective     Chief Complaint: Nausea and vomiting    Consult Reason: Perianal wound    HPI: Preston Wallis is a 59 y.o. male who presents to the hospital earlier this week with coffee-ground emesis.  Patient was recently admitted to the hospital and underwent sharp excisional debridement of a large perianal wound as well as diverting loop sigmoid colostomy with me approximately 2 months ago.  He has continued to have normal ostomy output and has been undergoing daily dressing changes at his nursing facility for the perianal wound.  Surgery service was asked to evaluate the perianal wound for possible debridement.    Review of Systems   Constitutional:  Negative for chills and fever.   HENT:  Negative for sore throat.    Respiratory:  Negative for shortness of breath.    Cardiovascular:  Negative for chest pain.   Gastrointestinal:  Positive for nausea and vomiting. Negative for abdominal pain.   Genitourinary:  Negative for difficulty urinating.   Neurological:  Negative for dizziness.   Psychiatric/Behavioral:  Negative for confusion.        Personal History     Past Medical History:   Diagnosis Date    1. Incision and drainage of posterior perianal abscess 2. Sharp excisional debridement through skin and subcutaneous tissue in the posterior perianal area, 9 x 6 x 1 cm 2025    Age-related cognitive decline     Allergic contact dermatitis     Allergies     Anemia     Bedbound     2023 \"MY LEG MUSCLES STOPPED WORKING\"    Bronchiectasis with acute lower respiratory infection     Charcot foot due to diabetes mellitus 09/10/2013    Chronic diastolic (congestive) heart failure     Chronic kidney disease     Chronic respiratory failure with hypoxia     Closed supracondylar fracture of femur 2022    COPD (chronic obstructive " "pulmonary disease)     Deep vein thrombosis (DVT) of lower extremity associated with air travel 01/13/2023    Dependence on supplemental oxygen     Eczema     Erectile dysfunction     due to organic reasons    Essential (primary) hypertension     Fracture     closed fracture of other tarsal and metatarsal bones    Fracture of proximal humerus 01/13/2023    GERD without esophagitis     High risk medication use     Hypercholesteremia     Hypomagnesemia     Infected stasis ulcer of left lower extremity 01/13/2023    Insomnia     Low back pain     Major depressive disorder     Morbid (severe) obesity due to excess calories     MRSA pneumonia     Muscle weakness     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 without evidence of necrosis     Obstructive sleep apnea (adult) (pediatric)     On home O2     REPORTS WEARING 2L/NC AAT    Other forms of dyspnea     Other long term (current) drug therapy     Other specified noninfective gastroenteritis and colitis     Other spondylosis, lumbar region     Pain in both knees     Paroxysmal atrial fibrillation     Peripheral neuropathy     attributed to type 2 diabetes    Pneumonia, unspecified organism     Polyneuropathy     Rash and other nonspecific skin eruption     Self-catheterizes urinary bladder     EVERY 4 HOURS    Smoking     \"SOMETIMES\"    Syncope and collapse     Tachycardia     Tinnitus 01/13/2023    Type 1 diabetes mellitus with diabetic chronic kidney disease     Type 2 diabetes mellitus     Unspecified fall, initial encounter     Urinary retention        Past Surgical History:   Procedure Laterality Date    BRONCHOSCOPY N/A 1/28/2025    Procedure: BRONCHOSCOPY: BAL: insertion of lighted instrument to view inside the lung;  Surgeon: Walter Nicole DO;  Location: [facility] MAIN OR;  Service: Pulmonary;  Laterality: N/A;    BRONCHOSCOPY N/A 2/3/2025    Procedure: BRONCHOSCOPY WITH BRONCHOALVEOLAR LAVAGE, POSSIBLE BIOPSY, " BRUSHING, WASHING, AIRWAY INSPECTION: insertion of lighted instrument to view inside the lung;  Surgeon: Chadd Swan MD;  Location: [facility] MAIN OR;  Service: Pulmonary;  Laterality: N/A;    CARDIAC CATHETERIZATION Left 8/15/2024    Procedure: Carbon dioxide aortogram with left leg angiogram, possible angioplasty or stenting;  Surgeon: Moshe Willson MD;  Location: [facility] CATH INVASIVE LOCATION;  Service: Vascular;  Laterality: Left;    CHOLECYSTECTOMY      COLOSTOMY N/A 2/6/2025    Procedure: COLOSTOMY LAPAROSCOPIC; plain text: creation of colostomy using small incisions;  Surgeon: Emerson Canada MD;  Location: [facility];  Service: General;  Laterality: N/A;    CYSTOSCOPY      FEMUR SURGERY Left     Shravan placed    INCISION AND DRAINAGE ABSCESS N/A 1/26/2025    Procedure: INCISION AND DRAINAGE ABSCESS; plain text: incision and drain pus from buttocks wound;  Surgeon: Emerson Canada MD;  Location: [facility];  Service: General;  Laterality: N/A;    KNEE SURGERY Left     OTHER SURGICAL HISTORY Left     venous port, REMOVED    PORTACATH PLACEMENT Right     TIBIAL PLATEAU OPEN REDUCTION INTERNAL FIXATION Left 12/22/2023    Procedure: TIBIAL PLATEAU OPEN REDUCTION INTERNAL FIXATION;  Surgeon: Hugo Kline MD;  Location: [facility] OR;  Service: Orthopedics;  Laterality: Left;    TONSILLECTOMY AND ADENOIDECTOMY         Family History: family history includes Asthma in his father; Cancer in his sister; Coronary artery disease in his mother; Diabetes type II in his mother and sister; Hypertension in his mother. Otherwise pertinent FHx was reviewed and not pertinent to current issue.    Social History:  reports that he quit smoking about 32 years ago. His smoking use included cigarettes. He started smoking about 44 years ago. He has a 12 pack-year smoking history. He has been exposed to tobacco smoke. He has never used smokeless tobacco. He reports that he does not currently use alcohol. He  reports that he does not use drugs.    Home Medications:  GNP One Daily Plus Iron, Insulin Lispro (1 Unit Dial), Magnesium Oxide -Mg Supplement, Povidone-Iodine, Semaglutide (1 MG/DOSE), Sodium Hypochlorite, Vitamin D3, apixaban, arformoterol, aspirin, atorvastatin, budesonide, busPIRone, calcium citrate, dapagliflozin, docusate sodium, famotidine, ferrous gluconate, finasteride, folic acid, insulin detemir, ipratropium-albuterol, montelukast, tamsulosin, tiotropium, tobramycin PF, vitamin C, and zinc sulfate    Allergies:  Allergies   Allergen Reactions    Benadryl [Diphenhydramine] Itching    Proventil [Albuterol] Other (See Comments)     Mouth sores         Objective    Objective     Vitals:   Temp:  [97.5 °F (36.4 °C)-98.9 °F (37.2 °C)] 98.1 °F (36.7 °C)  Heart Rate:  [85-94] 93  Resp:  [17-26] 18  BP: (107-165)/(52-77) 134/70  Flow (L/min) (Oxygen Therapy):  [2-9] 2    Physical Exam  Constitutional:       Appearance: Normal appearance.   HENT:      Head: Normocephalic and atraumatic.      Mouth/Throat:      Mouth: Mucous membranes are moist.      Pharynx: Oropharynx is clear.   Cardiovascular:      Rate and Rhythm: Normal rate and regular rhythm.   Pulmonary:      Effort: Pulmonary effort is normal. No respiratory distress.   Abdominal:      General: There is no distension.      Palpations: Abdomen is soft.      Tenderness: There is no abdominal tenderness.      Comments: Left lower quadrant colostomy pink/patent/productive   Genitourinary:     Comments: Perianal wound with healthy appearing granulation tissue, minimal fibrinous debris, no surrounding erythema, no evidence of expressible drainage or surrounding fluid collection  Musculoskeletal:         General: No swelling. Normal range of motion.      Cervical back: Normal range of motion and neck supple.   Skin:     General: Skin is warm and dry.   Neurological:      General: No focal deficit present.      Mental Status: He is alert and oriented to person,  place, and time.   Psychiatric:         Mood and Affect: Mood normal.         Behavior: Behavior normal.         Result Review    Result Review:  I have personally reviewed the results from the time of this admission to 3/19/2025 10:37 EDT and agree with these findings:  [x]  Laboratory  []  Microbiology  []  Radiology  []  EKG/Telemetry   []  Cardiology/Vascular   []  Pathology  []  Old records  []  Other:    Assessment & Plan   Assessment / Plan     Brief Patient Summary:  Preston Wallis is a 59 y.o. male who presented to the hospital with hematemesis.    Active Hospital Problems:  Active Hospital Problems    Diagnosis     **GI bleed        Plan:   Perianal wound  -Afebrile, vitals stable, no leukocytosis  -No role for surgical debridement of the perianal wound at this time  -Okay to continue wet-to-dry gauze changes once daily or more frequently as needed  -Will sign off, but please call with any questions or concerns    Electronically signed by Emerson Canada MD, 03/19/25, 10:37 AM EDT.

## 2025-03-19 NOTE — PLAN OF CARE
Goal Outcome Evaluation:              Outcome Evaluation: Patient alert and oriented throughout shift. On 2 liters NC with no signs of distress. EGD completed on shift; pt tolerated well. clear liquid diet and advance as tolerated.  Wound care  completed at bedside. Will continue with patient plan of care.

## 2025-03-20 ENCOUNTER — READMISSION MANAGEMENT (OUTPATIENT)
Dept: CALL CENTER | Facility: HOSPITAL | Age: 60
End: 2025-03-20
Payer: COMMERCIAL

## 2025-03-20 ENCOUNTER — TRANSITIONAL CARE MANAGEMENT TELEPHONE ENCOUNTER (OUTPATIENT)
Dept: CALL CENTER | Facility: HOSPITAL | Age: 60
End: 2025-03-20
Payer: COMMERCIAL

## 2025-03-20 ENCOUNTER — PATIENT OUTREACH (OUTPATIENT)
Dept: CASE MANAGEMENT | Facility: OTHER | Age: 60
End: 2025-03-20
Payer: COMMERCIAL

## 2025-03-20 DIAGNOSIS — Z79.899 ENCOUNTER FOR MEDICATION MANAGEMENT: Primary | ICD-10-CM

## 2025-03-20 LAB
CYTO UR: NORMAL
LAB AP CASE REPORT: NORMAL
LAB AP CLINICAL INFORMATION: NORMAL
PATH REPORT.FINAL DX SPEC: NORMAL
PATH REPORT.GROSS SPEC: NORMAL

## 2025-03-20 NOTE — OUTREACH NOTE
Prep Survey      Flowsheet Row Responses   [facility] patient discharged from? [place]   Is LACE score < 7 ? No   Eligibility [facility]   Date of Admission 03/16/25   Date of Discharge 03/19/25   [place] [facility]   [place]charge diagnosis GI Bleed   Does the patient have one of the following disease processes/diagnoses(primary or secondary)? Other   Does the patient have Home health ordered? Yes   What is the Home health agency?  [facility]   Is there a DME ordered? No   Medication alerts for this patient see avs   Prep survey completed? Yes            Karen VIRAMONTES - Registered Nurse

## 2025-03-20 NOTE — OUTREACH NOTE
AMBULATORY CASE MANAGEMENT NOTE    Names and Relationships of Patient/Support Persons: Contact: Kami Wallis; Relationship: Emergency Contact -     Elizabeth came in and wanted to go over discharge medications post discharge.  Slight changes noted.  Planned medications for 30 days with her.  Gómez is back home and not at rehab.  Home health was contacted and re-admitted today.      Tara GOMEZ  Ambulatory Case Management    3/20/2025, 16:46 EDT

## 2025-03-20 NOTE — OUTREACH NOTE
Call Center TCM Note      Flowsheet Row Responses   [facility], [place], operated by [facility] patient discharged from? [place]   Does the patient have one of the following disease processes/diagnoses(primary or secondary)? Other   TCM attempt successful? Yes  [VR for Kami, wife]   Call start time 0835   Call end time 0849   Discharge diagnosis GI Bleed   Person spoke with today (if not patient) and relationship Wife   Meds reviewed with patient/caregiver? Yes   Is the patient having any side effects they believe may be caused by any medication additions or changes? No   Does the patient have all medications ordered at discharge? No   Prescription comments Pt is on alternating Jt 28 days off and 28 days on,  currently on the off cycle..  Reviewed instructions with wife which indicate to restart on 3/27/25   Comments Hospital f/u 4/1/25@1200pm, Surgery 3/24/25@0900, Uro 3/31/25@1000am   Does the patient have an appointment with their PCP within 7-14 days of discharge? Yes   Nursing Interventions Confirmed date/time of appointment   What is the Home health agency?  [facility]   Has home health visited the patient within 72 hours of discharge? Unsure   DME comments Pt had sling ordered for lift and will be delivered toda.  Pt already had O2 at home for use   Psychosocial issues? No   Did the patient receive a copy of their discharge instructions? Yes   Nursing interventions Reviewed instructions with patient   What is the patient's perception of their health status since discharge? Same  [Reports no further issues with emesis at this time, mindful to watch for return of symptoms.  Pt has orders for dressing changes, wife reports she has supplies needed and has done the ones on heel in the past, was provided education on figueroa-rectal wound.]   Is the patient/caregiver able to teach back signs and symptoms related to disease process for when to call PCP? Yes   Is the patient/caregiver able to teach back signs and symptoms related  to disease process for when to call 911? Yes   Additional teach back comments Wound care clinic referral in place,  to assist with wound care to assist until seen.   TCM call completed? Yes   Call end time [id number]            Carmen Zurita RN    3/20/2025, 08:58 EDT

## 2025-03-24 ENCOUNTER — TELEPHONE (OUTPATIENT)
Dept: SURGERY | Facility: CLINIC | Age: 60
End: 2025-03-24

## 2025-03-24 NOTE — TELEPHONE ENCOUNTER
Caller: Kami Wallis    Relationship:  Emergency Contact    Best call back number: [phone]     PATIENT CALLED REQUESTING TO CANCEL SAME DAY APPT.    Did the patient call AFTER the start time of their scheduled appointment?  []YES  [x]NO    Was the patient's appointment rescheduled? []YES  [x]NO    Any additional information: SAME DAY APPT CANCEL

## 2025-03-25 ENCOUNTER — OFFICE VISIT (OUTPATIENT)
Dept: WOUND CARE | Facility: HOSPITAL | Age: 60
End: 2025-03-25
Payer: COMMERCIAL

## 2025-03-25 VITALS
DIASTOLIC BLOOD PRESSURE: 45 MMHG | TEMPERATURE: 97.4 F | RESPIRATION RATE: 18 BRPM | HEART RATE: 84 BPM | SYSTOLIC BLOOD PRESSURE: 135 MMHG

## 2025-03-25 DIAGNOSIS — E66.811 CLASS 1 OBESITY WITH SERIOUS COMORBIDITY AND BODY MASS INDEX (BMI) OF 33.0 TO 33.9 IN ADULT, UNSPECIFIED OBESITY TYPE: ICD-10-CM

## 2025-03-25 DIAGNOSIS — L89.620 PRESSURE INJURY OF LEFT HEEL, UNSTAGEABLE: Primary | ICD-10-CM

## 2025-03-25 DIAGNOSIS — L97.509 TYPE 2 DIABETES MELLITUS WITH FOOT ULCER, WITH LONG-TERM CURRENT USE OF INSULIN: ICD-10-CM

## 2025-03-25 DIAGNOSIS — Z79.4 TYPE 2 DIABETES MELLITUS WITH FOOT ULCER, WITH LONG-TERM CURRENT USE OF INSULIN: ICD-10-CM

## 2025-03-25 DIAGNOSIS — Z99.81 CHRONIC RESPIRATORY FAILURE WITH HYPOXIA, ON HOME O2 THERAPY: ICD-10-CM

## 2025-03-25 DIAGNOSIS — E11.621 TYPE 2 DIABETES MELLITUS WITH FOOT ULCER, WITH LONG-TERM CURRENT USE OF INSULIN: ICD-10-CM

## 2025-03-25 DIAGNOSIS — I73.9 PERIPHERAL ARTERIAL DISEASE WITH HISTORY OF REVASCULARIZATION: ICD-10-CM

## 2025-03-25 DIAGNOSIS — J96.11 CHRONIC RESPIRATORY FAILURE WITH HYPOXIA, ON HOME O2 THERAPY: ICD-10-CM

## 2025-03-25 DIAGNOSIS — E11.65 UNCONTROLLED TYPE 2 DIABETES MELLITUS WITH HYPERGLYCEMIA: ICD-10-CM

## 2025-03-25 DIAGNOSIS — L89.152 PRESSURE INJURY OF SACRAL REGION, STAGE 2: ICD-10-CM

## 2025-03-25 DIAGNOSIS — Z98.890 PERIPHERAL ARTERIAL DISEASE WITH HISTORY OF REVASCULARIZATION: ICD-10-CM

## 2025-03-25 PROCEDURE — 11042 DBRDMT SUBQ TIS 1ST 20SQCM/<: CPT | Performed by: EMERGENCY MEDICINE

## 2025-03-25 PROCEDURE — 3078F DIAST BP <80 MM HG: CPT | Performed by: EMERGENCY MEDICINE

## 2025-03-25 PROCEDURE — 3075F SYST BP GE 130 - 139MM HG: CPT | Performed by: EMERGENCY MEDICINE

## 2025-03-25 PROCEDURE — 99215 OFFICE O/P EST HI 40 MIN: CPT | Performed by: EMERGENCY MEDICINE

## 2025-03-25 PROCEDURE — 1159F MED LIST DOCD IN RCRD: CPT | Performed by: EMERGENCY MEDICINE

## 2025-03-25 PROCEDURE — 1160F RVW MEDS BY RX/DR IN RCRD: CPT | Performed by: EMERGENCY MEDICINE

## 2025-03-25 RX ORDER — SODIUM HYPOCHLORITE 1.25 MG/ML
10 SOLUTION TOPICAL 2 TIMES DAILY
Qty: 1000 ML | Refills: 1 | Status: SHIPPED | OUTPATIENT
Start: 2025-03-25

## 2025-03-25 RX ORDER — COLLAGENASE SANTYL 250 [ARB'U]/G
1 OINTMENT TOPICAL DAILY
Qty: 30 G | Refills: 1 | Status: SHIPPED | OUTPATIENT
Start: 2025-03-25

## 2025-03-25 NOTE — PROGRESS NOTES
Chief Complaint  Wound Check (Follow-up on Left heel and Buttocks Wounds)    Subjective      History of Present Illness    Preston Wallis  is a 59 y.o. male     History of Present Illness  The patient is a 59-year-old male here for evaluation of a perianal wound and a left heel wound. He has not been seen since December 2024 due to frequent hospitalizations over the past few months.    Much of the history is reported by his wife in the presence of the patient.  He developed a perianal wound, necessitating the placement of a colostomy to facilitate healing. He has been receiving home health care, with a nurse scheduled to visit tomorrow. He spends most of his time in bed, only using a wheelchair for clinic visits. The perianal wound is cleaned and treated with Dakin's solution, followed by the application of a large bandage. He is also developing a small wound on his left buttock, attributed to the bandage. He was slid over to the chair using a sliding board at home, but they will start using the Elvia sling for clinic visits for ease of transfer.      He also developed a significant wound on his left heel, which was essentially healed until a hospitalization and his subsequent stay at [facility]. At this facility, a wound VAC was applied to his sacral wound. He was unable to visit the doctor due to the wound VAC, so he was sent to the facility. He notes that the facility did not provide adequate padding or pillows for his leg and the heel wound recurred and worsened. Currently, he uses pillows to elevate his leg at home. He still has foam boots available for use but has not been using them. He inquired about the necessity of resuming Betadine treatment for his foot, which was previously used for a large eschar. He requests a prescription for Santyl and Dakin's solution. He has been applying Santyl ointment to his foot, followed by Dakin's wet-to-dry dressings nightly.    MEDICATIONS  Current:  Santyl, Dakin's solution.  Past: Betadine.    Allergies:  Benadryl [diphenhydramine] and Proventil [albuterol]      Current Outpatient Medications:     arformoterol (BROVANA) 15 MCG/2ML nebulizer solution, Take 2 mL by nebulization 2 (Two) Times a Day., Disp: 360 mL, Rfl: 3    Aspirin Low Dose 81 MG EC tablet, TAKE 1 TABLET BY MOUTH EVERY MORNING, Disp: 30 tablet, Rfl: 3    atorvastatin (LIPITOR) 20 MG tablet, TAKE 1 TABLET BY MOUTH EVERY NIGHT AT BEDTIME, Disp: 30 tablet, Rfl: 3    budesonide (Pulmicort) 0.5 MG/2ML nebulizer solution, Take 2 mL by nebulization 2 (Two) Times a Day., Disp: 360 mL, Rfl: 3    busPIRone (BUSPAR) 15 MG tablet, TAKE 1 TABLET BY MOUTH TWICE DAILY, Disp: 60 tablet, Rfl: 3    calcium citrate (CALCITRATE) 950 (200 Ca) MG tablet, TAKE 1 TABLET BY MOUTH EVERY NIGHT AT BEDTIME, Disp: 30 tablet, Rfl: 3    Cholecalciferol (Vitamin D3) 50 MCG (2000 UT) tablet, TAKE 1 TABLET BY MOUTH EVERY MORNING, Disp: 30 tablet, Rfl: 3    collagenase (Santyl) 250 UNIT/GM ointment, Apply 1 Application topically to the appropriate area as directed Daily., Disp: 30 g, Rfl: 1    dilTIAZem CD (CARDIZEM CD) 240 MG 24 hr capsule, Take 1 capsule by mouth Daily for 30 days., Disp: 30 capsule, Rfl: 0    docusate sodium (COLACE) 100 MG capsule, TAKE 1 CAPSULE BY MOUTH TWICE DAILY, Disp: 60 capsule, Rfl: 3    Eliquis 5 MG tablet tablet, TAKE 1 TABLET BY MOUTH EVERY TWELVE HOURS, Disp: 60 tablet, Rfl: 3    Farxiga 5 MG tablet tablet, TAKE 1 TABLET BY MOUTH EVERY MORNING, Disp: 30 tablet, Rfl: 3    ferrous gluconate (FERGON) 324 MG tablet, TAKE 1 TABLET BY MOUTH EVERY MORNING, Disp: 30 tablet, Rfl: 3    finasteride (PROSCAR) 5 MG tablet, TAKE 1 TABLET BY MOUTH EVERY NIGHT AT BEDTIME, Disp: 30 tablet, Rfl: 3    folic acid (FOLVITE) 1 MG tablet, TAKE 1 TABLET BY MOUTH EVERY NIGHT AT BEDTIME, Disp: 30 tablet, Rfl: 3    insulin detemir (Levemir) 100 UNIT/ML injection, Inject 5 Units under the skin into the appropriate area as  directed Every Night for 180 days., Disp: 15 mL, Rfl: 1    Insulin Lispro, 1 Unit Dial, (HUMALOG) 100 UNIT/ML solution pen-injector, Inject 5 Units under the skin into the appropriate area as directed 3 (Three) Times a Day Before Meals., Disp: 15 mL, Rfl: 0    ipratropium-albuterol (DUO-NEB) 0.5-2.5 mg/3 ml nebulizer, Take 3 mL by nebulization Every 4 (Four) Hours As Needed for Wheezing or Shortness of Air., Disp: 360 mL, Rfl: 5    Magnesium Oxide -Mg Supplement 400 (240 Mg) MG tablet, Take 1 tablet by mouth every night at bedtime., Disp: , Rfl:     montelukast (SINGULAIR) 10 MG tablet, Take 1 tablet by mouth Every Night., Disp: 30 tablet, Rfl: 5    Multiple Vitamins-Iron (GNP One Daily Plus Iron) tablet, TAKE 1 TABLET BY MOUTH EVERY MORNING, Disp: 30 each, Rfl: 3    pantoprazole (PROTONIX) 40 MG EC tablet, Take 1 tablet by mouth Every Morning., Disp: 30 tablet, Rfl: 1    Povidone-Iodine (Betadine) 5 % external solution 5%, Apply 1 mL topically to the appropriate area as directed 2 (Two) Times a Day. Left Heel, Disp: , Rfl:     Semaglutide, 1 MG/DOSE, (Ozempic, 1 MG/DOSE,) 4 MG/3ML solution pen-injector, Inject 1 mg under the skin into the appropriate area as directed 1 (One) Time Per Week. (Patient taking differently: Inject 1 mg under the skin into the appropriate area as directed 1 (One) Time Per Week. Saturday), Disp: 3 mL, Rfl: 5    Sodium Hypochlorite (Anasept) 0.057 % liquid, Apply 1 Application topically Daily. Sacrum, Disp: , Rfl:     Sodium Hypochlorite (Anasept) 0.057 % liquid, Apply 1 Application topically As Needed (This is in addition to the QD scheduled application). Sacrum, Disp: , Rfl:     sodium hypochlorite (DAKIN'S 1/4 STRENGTH) 0.125 % solution topical solution 0.125%, Apply 10 mL topically to the appropriate area as directed 2 (Two) Times a Day., Disp: 1000 mL, Rfl: 1    tamsulosin (FLOMAX) 0.4 MG capsule 24 hr capsule, TAKE 1 CAPSULE BY MOUTH EVERY NIGHT AT BEDTIME, Disp: 30 capsule, Rfl: 3     "tiotropium (SPIRIVA) 18 MCG per inhalation capsule, Place 1 capsule into inhaler and inhale Daily., Disp: 30 capsule, Rfl: 5    [START ON 3/27/2025] tobramycin PF (MOIZ) 300 MG/5ML nebulizer solution, Take 5 mL by nebulization 2 (Two) Times a Day for 28 days. nebulize 1 vial BY MOUTH TWICE DAILY FOR 28 DAYS ON AND 28 DAYS OFF, Disp: , Rfl:     vitamin C (ASCORBIC ACID) 500 MG tablet, TAKE 1 TABLET BY MOUTH TWICE DAILY, Disp: 60 tablet, Rfl: 3    zinc sulfate (Zinc-220) 220 (50 Zn) MG capsule, Take 1 capsule by mouth Daily., Disp: , Rfl:     Past Medical History:   Diagnosis Date    1. Incision and drainage of posterior perianal abscess 2. Sharp excisional debridement through skin and subcutaneous tissue in the posterior perianal area, 9 x 6 x 1 cm 01/26/2025    Age-related cognitive decline     Allergic contact dermatitis     Allergies     Anemia     Bedbound     11/2023 \"MY LEG MUSCLES STOPPED WORKING\"    Bronchiectasis with acute lower respiratory infection     Charcot foot due to diabetes mellitus 09/10/2013    Chronic diastolic (congestive) heart failure     Chronic kidney disease     Chronic respiratory failure with hypoxia     Closed supracondylar fracture of femur 01/12/2022    COPD (chronic obstructive pulmonary disease)     Deep vein thrombosis (DVT) of lower extremity associated with air travel 01/13/2023    Dependence on supplemental oxygen     Eczema     Erectile dysfunction     due to organic reasons    Essential (primary) hypertension     Fracture     closed fracture of other tarsal and metatarsal bones    Fracture of proximal humerus 01/13/2023    GERD without esophagitis     High risk medication use     Hypercholesteremia     Hypomagnesemia     Infected stasis ulcer of left lower extremity 01/13/2023    Insomnia     Low back pain     Major depressive disorder     Morbid (severe) obesity due to excess calories     MRSA pneumonia     Muscle weakness     Non-pressure chronic ulcer of other part of " "unspecified foot with bone involvement without evidence of necrosis     Obstructive sleep apnea (adult) (pediatric)     On home O2     REPORTS WEARING 2L/NC AAT    Other forms of dyspnea     Other long term (current) drug therapy     Other specified noninfective gastroenteritis and colitis     Other spondylosis, lumbar region     Pain in both knees     Paroxysmal atrial fibrillation     Peripheral neuropathy     attributed to type 2 diabetes    Pneumonia, unspecified organism     Polyneuropathy     Rash and other nonspecific skin eruption     Self-catheterizes urinary bladder     EVERY 4 HOURS    Smoking     \"SOMETIMES\"    Syncope and collapse     Tachycardia     Tinnitus 01/13/2023    Type 1 diabetes mellitus with diabetic chronic kidney disease     Type 2 diabetes mellitus     Unspecified fall, initial encounter     Urinary retention      Past Surgical History:   Procedure Laterality Date    BRONCHOSCOPY N/A 1/28/2025    Procedure: BRONCHOSCOPY: BAL: insertion of lighted instrument to view inside the lung;  Surgeon: Walter Nicole DO;  Location: [facility] MAIN OR;  Service: Pulmonary;  Laterality: N/A;    BRONCHOSCOPY N/A 2/3/2025    Procedure: BRONCHOSCOPY WITH BRONCHOALVEOLAR LAVAGE, POSSIBLE BIOPSY, BRUSHING, WASHING, AIRWAY INSPECTION: insertion of lighted instrument to view inside the lung;  Surgeon: Chadd Swan MD;  Location: [facility] MAIN OR;  Service: Pulmonary;  Laterality: N/A;    CARDIAC CATHETERIZATION Left 8/15/2024    Procedure: Carbon dioxide aortogram with left leg angiogram, possible angioplasty or stenting;  Surgeon: Moshe Willson MD;  Location: [facility] CATH INVASIVE LOCATION;  Service: Vascular;  Laterality: Left;    CHOLECYSTECTOMY      COLOSTOMY N/A 2/6/2025    Procedure: COLOSTOMY LAPAROSCOPIC; plain text: creation of colostomy using small incisions;  Surgeon: Emerson Canada MD;  Location: [facility] OR OSC;  Service: General;  Laterality: N/A;    CYSTOSCOPY      ENDOSCOPY N/A " 3/18/2025    Procedure: ESOPHAGOGASTRODUODENOSCOPY WITH BIOPSIES;  Surgeon: Rafael Hernandez MD;  Location: [facility] ENDOSCOPY;  Service: Gastroenterology;  Laterality: N/A;  HIATAL HERNIA, REFLUX ESOPHAGITIS    FEMUR SURGERY Left     Shravan placed    INCISION AND DRAINAGE ABSCESS N/A 2025    Procedure: INCISION AND DRAINAGE ABSCESS; plain text: incision and drain pus from buttocks wound;  Surgeon: Emerson Canada MD;  Location: [facility] OR OSC;  Service: General;  Laterality: N/A;    KNEE SURGERY Left     OTHER SURGICAL HISTORY Left     venous port, REMOVED    PORTACATH PLACEMENT Right     TIBIAL PLATEAU OPEN REDUCTION INTERNAL FIXATION Left 2023    Procedure: TIBIAL PLATEAU OPEN REDUCTION INTERNAL FIXATION;  Surgeon: Hugo Kline MD;  Location: [facility] OR;  Service: Orthopedics;  Laterality: Left;    TONSILLECTOMY AND ADENOIDECTOMY       Social History     Socioeconomic History    Marital status:    Tobacco Use    Smoking status: Former     Current packs/day: 0.00     Average packs/day: 1 pack/day for 12.0 years (12.0 ttl pk-yrs)     Types: Cigarettes     Start date:      Quit date:      Years since quittin.2     Passive exposure: Past    Smokeless tobacco: Never   Vaping Use    Vaping status: Never Used   Substance and Sexual Activity    Alcohol use: Not Currently    Drug use: Never    Sexual activity: Defer           Objective     Vitals:    25 1124   BP: 135/45   BP Location: Left arm   Patient Position: Lying   Cuff Size: Adult   Pulse: 84   Resp: 18  Comment: O2: 95% on 2L/NC   Temp: 97.4 °F (36.3 °C)   TempSrc: Temporal   PainSc: 4    PainLoc: Buttocks     There is no height or weight on file to calculate BMI.    STEADI Fall Risk Assessment has not been completed.     Review of Systems     ROS:  Per HPI.     I have reviewed the HPI and ROS as documented by MA/RN. Katerin Torres MD    Physical Exam     NAD  AAOx3, pleasant, cooperative      Physical  Exam  The patient's left heel exhibits an unstageable pressure injury with a significant amount of loose necrotic slough and tightly adhered slough as well. There is some scattered granulation tissue along the edges. The sacral wound has a pink, healthy base directly adjacent to the anus. There is some evidence of deep tissue pressure injury along the superior most aspect. No evidence of infection in either wound.               Result Review :  The following data was reviewed by: Katerin Torres MD on 03/25/2025:    Prior notes and images.  Multiple discharge summaries, multiple inpatient wound images, CT abdomen pelvis, hemoglobin A1c 8.5, no wound cultures from recent hospitalization      Wound debridement Pressure Injury    Performed by: Katerin Torres MD  Authorized by: Katerin Torres MD  Associated wounds:   Wound 02/16/24 1700 Left posterior foot Pressure Injury    Correct patient: Identification verified by two methods:     Verbally and Date of birth  Correct procedure/consent    Correct site/side    Correct equipment as applicable    How many wounds are you performing a debridement on?:  1  Wound 1:     Nursing Documentation:   Measurements:     The total amount debrided on this wound was under 20 cm2.     Provider Documentation    Debridement type:  Sharp/excisional    Betadine      Other:  Sterile 10 blade     None    Tissue debrided:  Large    Type tissue:  Slough    Level removed:  Subcutaneous    Patient tolerance:  Good    Hemostasis achieved by:  Direct pressure    Wound Bed Post Debridement: See Photo            Assessment and Plan   Diagnoses and all orders for this visit:    1. Pressure injury of left heel, unstageable (Primary)  -     sodium hypochlorite (DAKIN'S 1/4 STRENGTH) 0.125 % solution topical solution 0.125%; Apply 10 mL topically to the appropriate area as directed 2 (Two) Times a Day.  Dispense: 1000 mL; Refill: 1  -     collagenase (Santyl) 250 UNIT/GM ointment; Apply 1 Application  topically to the appropriate area as directed Daily.  Dispense: 30 g; Refill: 1  -     Wound debridement    2. Pressure injury of sacral region, stage 2  -     sodium hypochlorite (DAKIN'S 1/4 STRENGTH) 0.125 % solution topical solution 0.125%; Apply 10 mL topically to the appropriate area as directed 2 (Two) Times a Day.  Dispense: 1000 mL; Refill: 1    3. Type 2 diabetes mellitus with foot ulcer, with long-term current use of insulin    4. Peripheral arterial disease with history of revascularization    5. Chronic respiratory failure with hypoxia, on home O2 therapy    6. Class 1 obesity with serious comorbidity and body mass index (BMI) of 33.0 to 33.9 in adult, unspecified obesity type    7. Uncontrolled type 2 diabetes mellitus with hyperglycemia        Assessment & Plan  1. Left heel wound.  The left heel exhibits an unstageable pressure injury with significant amounts of loose necrotic slough and tightly adhered slough, along with some scattered granulation tissue along the edges. There is no evidence of infection. He is advised to continue using Santyl to the base and Dakin's wet-to-dry dressings once daily. It is crucial to keep pressure off the heel to prevent further deterioration. The use of foam boots is recommended to help alleviate pressure. Prescriptions for Santyl and Dakin's will be sent to [facility].    2. Perianal wound.  The perianal wound has a pink, healthy base directly adjacent to the anus, with some evidence of deep tissue pressure injury along the superior aspect. There is no evidence of infection. The colostomy should aid in the healing process by preventing stool contamination. He is advised to avoid putting pressure on the wound by rotating side to side every 2 hours and staying off his bottom. Continue using Dakin's wet-to-dry dressings once daily.    Follow-up  The patient will follow up in 3 to 4 weeks.      Patient was given instructions and counseling regarding their  condition or for health maintenance advice, as well as the wound care plan and recommendations. They understand and agree with the plan.  They will follow back up here in the clinic but are instructed to contact us in the interim should they have any new, returning, or worsening symptoms or concerns. Please see specific information pulled into the AVS if appropriate.     Dragon Dictation utilized for chart completion.    I spent 46 minutes in both face-to-face and nonface-to-face time for patient care today including, but not limited to, review of old records, reviewing old images, history and physical exam, reviewing test results, counseling patient and family, entering orders, coordinating care, and documenting.  This is separate from the above documented procedure.      Follow Up   Return in about 4 weeks (around 4/22/2025).      Katerin Torres MD    Patient or patient representative verbalized consent for the use of Ambient Listening during the visit with  Katerin Torres MD for chart documentation. 3/25/2025  12:06 EDT

## 2025-03-28 ENCOUNTER — TELEPHONE (OUTPATIENT)
Dept: UROLOGY | Age: 60
End: 2025-03-28
Payer: COMMERCIAL

## 2025-03-28 ENCOUNTER — TELEPHONE (OUTPATIENT)
Dept: SURGERY | Facility: CLINIC | Age: 60
End: 2025-03-28
Payer: COMMERCIAL

## 2025-03-28 ENCOUNTER — PATIENT OUTREACH (OUTPATIENT)
Dept: CASE MANAGEMENT | Facility: OTHER | Age: 60
End: 2025-03-28
Payer: COMMERCIAL

## 2025-03-28 ENCOUNTER — TELEPHONE (OUTPATIENT)
Dept: DIABETES SERVICES | Facility: HOSPITAL | Age: 60
End: 2025-03-28

## 2025-03-28 DIAGNOSIS — Z79.4 TYPE 2 DIABETES MELLITUS WITH HYPERGLYCEMIA, WITH LONG-TERM CURRENT USE OF INSULIN: Primary | ICD-10-CM

## 2025-03-28 DIAGNOSIS — E11.65 TYPE 2 DIABETES MELLITUS WITH HYPERGLYCEMIA, WITH LONG-TERM CURRENT USE OF INSULIN: Primary | ICD-10-CM

## 2025-03-28 DIAGNOSIS — E11.65 UNCONTROLLED TYPE 2 DIABETES MELLITUS WITH HYPERGLYCEMIA: ICD-10-CM

## 2025-03-28 NOTE — TELEPHONE ENCOUNTER
Provider: OLGA RAY    Caller: Kami Wallis    Relationship to Patient: Emergency Contact    Pharmacy: [facility]    Phone Number: [phone]    Reason for Call: SINCE LEVIMIR HAS BEEN DISCONTINUED, PATIENT NEEDS AN ALTERNATIVE ON WHAT HE CAN USE. PLEASE ADVISE

## 2025-03-28 NOTE — TELEPHONE ENCOUNTER
Spoke with the patient's wife. She states that they are currently trying to go through [facility] to get these supplies. I spoke with [facility] and asked for them to send an order for us to sign.

## 2025-03-28 NOTE — OUTREACH NOTE
AMBULATORY CASE MANAGEMENT NOTE    Names and Relationships of Patient/Support Persons: Contact: Kami Wallis; Relationship: Emergency Contact -     Kami called to report that Levemir is no longer being manufactured and needs another long acting insulin substitute.      Tara GOMEZ  Ambulatory Case Management    3/28/2025, 15:39 EDT

## 2025-03-28 NOTE — TELEPHONE ENCOUNTER
CALLED PT TO OFFER R/S OF CX'D APPT 2/24 W/ ANGELLA    SPOKE W/ PT'S WIFE INGA LISTED ON  [facility] WHO STATED SHE WOULD CALL BACK TO R/S.    ANYTHING ELSE TO DO?

## 2025-03-28 NOTE — TELEPHONE ENCOUNTER
Patients wife called and states they need a presciption for colostomy supplies and catheters. Please advise!

## 2025-03-30 RX ORDER — INSULIN GLARGINE 100 [IU]/ML
15 INJECTION, SOLUTION SUBCUTANEOUS DAILY
Qty: 15 ML | Refills: 1 | Status: SHIPPED | OUTPATIENT
Start: 2025-03-30 | End: 2025-04-02 | Stop reason: SDUPTHER

## 2025-03-31 ENCOUNTER — PATIENT OUTREACH (OUTPATIENT)
Dept: CASE MANAGEMENT | Facility: OTHER | Age: 60
End: 2025-03-31
Payer: COMMERCIAL

## 2025-03-31 ENCOUNTER — OFFICE VISIT (OUTPATIENT)
Dept: UROLOGY | Facility: CLINIC | Age: 60
End: 2025-03-31
Payer: COMMERCIAL

## 2025-03-31 VITALS — HEIGHT: 69 IN | BODY MASS INDEX: 33.62 KG/M2 | WEIGHT: 227 LBS

## 2025-03-31 DIAGNOSIS — N31.2 NEUROGENIC BLADDER, FLACCID: ICD-10-CM

## 2025-03-31 DIAGNOSIS — Z79.4 TYPE 2 DIABETES MELLITUS WITH HYPERGLYCEMIA, WITH LONG-TERM CURRENT USE OF INSULIN: Primary | ICD-10-CM

## 2025-03-31 DIAGNOSIS — J44.1 CHRONIC OBSTRUCTIVE PULMONARY DISEASE WITH ACUTE EXACERBATION: ICD-10-CM

## 2025-03-31 DIAGNOSIS — N18.4 CHRONIC KIDNEY DISEASE, STAGE IV (SEVERE): ICD-10-CM

## 2025-03-31 DIAGNOSIS — J96.11 CHRONIC RESPIRATORY FAILURE WITH HYPOXIA AND HYPERCAPNIA: ICD-10-CM

## 2025-03-31 DIAGNOSIS — R33.9 URINARY RETENTION: Primary | ICD-10-CM

## 2025-03-31 DIAGNOSIS — Z78.9 INTERMITTENT SELF-CATHETERIZATION OF BLADDER: ICD-10-CM

## 2025-03-31 DIAGNOSIS — E11.65 TYPE 2 DIABETES MELLITUS WITH HYPERGLYCEMIA, WITH LONG-TERM CURRENT USE OF INSULIN: Primary | ICD-10-CM

## 2025-03-31 DIAGNOSIS — J96.12 CHRONIC RESPIRATORY FAILURE WITH HYPOXIA AND HYPERCAPNIA: ICD-10-CM

## 2025-03-31 PROBLEM — M17.11 PRIMARY OSTEOARTHRITIS OF RIGHT KNEE: Status: RESOLVED | Noted: 2023-04-07 | Resolved: 2025-03-31

## 2025-03-31 PROBLEM — J18.9 PNA (PNEUMONIA): Status: RESOLVED | Noted: 2025-01-23 | Resolved: 2025-03-31

## 2025-03-31 PROBLEM — L97.421 SKIN ULCER OF LEFT HEEL, LIMITED TO BREAKDOWN OF SKIN: Status: RESOLVED | Noted: 2024-02-16 | Resolved: 2025-03-31

## 2025-03-31 PROBLEM — F32.4 MAJOR DEPRESSIVE DISORDER, SINGLE EPISODE, IN PARTIAL REMISSION: Chronic | Status: ACTIVE | Noted: 2023-03-20

## 2025-03-31 PROBLEM — N18.9 HISTORY OF ANEMIA DUE TO CHRONIC KIDNEY DISEASE: Status: RESOLVED | Noted: 2023-01-13 | Resolved: 2025-03-31

## 2025-03-31 PROBLEM — J12.82 PNEUMONIA DUE TO COVID-19 VIRUS: Status: RESOLVED | Noted: 2021-10-19 | Resolved: 2025-03-31

## 2025-03-31 PROBLEM — Z91.89 AT RISK FOR VENOUS THROMBOEMBOLISM (VTE): Status: ACTIVE | Noted: 2024-02-26

## 2025-03-31 PROBLEM — J96.22 ACUTE ON CHRONIC RESPIRATORY FAILURE WITH HYPERCAPNIA: Status: RESOLVED | Noted: 2024-04-14 | Resolved: 2025-03-31

## 2025-03-31 PROBLEM — G47.00 INSOMNIA: Status: ACTIVE | Noted: 2023-02-07

## 2025-03-31 PROBLEM — Z98.890 STATUS POST INCISION AND DRAINAGE: Status: RESOLVED | Noted: 2025-01-26 | Resolved: 2025-03-31

## 2025-03-31 PROBLEM — S72.92XD: Status: RESOLVED | Noted: 2023-04-07 | Resolved: 2025-03-31

## 2025-03-31 PROBLEM — K92.2 GI BLEED: Status: RESOLVED | Noted: 2025-03-16 | Resolved: 2025-03-31

## 2025-03-31 PROBLEM — S82.122A: Status: ACTIVE | Noted: 2024-01-23

## 2025-03-31 PROBLEM — N17.9 AKI (ACUTE KIDNEY INJURY): Status: RESOLVED | Noted: 2022-01-12 | Resolved: 2025-03-31

## 2025-03-31 PROBLEM — I11.0 HYPERTENSIVE HEART DISEASE WITH CHRONIC DIASTOLIC CONGESTIVE HEART FAILURE: Status: ACTIVE | Noted: 2022-05-12

## 2025-03-31 PROBLEM — Z00.00 ANNUAL PHYSICAL EXAM: Status: RESOLVED | Noted: 2023-02-07 | Resolved: 2025-03-31

## 2025-03-31 PROBLEM — I50.32 HYPERTENSIVE HEART DISEASE WITH CHRONIC DIASTOLIC CONGESTIVE HEART FAILURE: Status: ACTIVE | Noted: 2022-05-12

## 2025-03-31 PROBLEM — L97.521 ULCER OF LEFT FOOT, LIMITED TO BREAKDOWN OF SKIN: Status: RESOLVED | Noted: 2024-02-16 | Resolved: 2025-03-31

## 2025-03-31 PROBLEM — K61.0 PERIANAL ABSCESS: Status: RESOLVED | Noted: 2025-01-23 | Resolved: 2025-03-31

## 2025-03-31 PROBLEM — K70.30 ALCOHOLIC CIRRHOSIS: Status: ACTIVE | Noted: 2023-03-20

## 2025-03-31 PROBLEM — A41.9 SEPSIS: Status: RESOLVED | Noted: 2023-12-06 | Resolved: 2025-03-31

## 2025-03-31 PROBLEM — M17.12 PRIMARY OSTEOARTHRITIS OF LEFT KNEE: Status: RESOLVED | Noted: 2023-02-07 | Resolved: 2025-03-31

## 2025-03-31 PROBLEM — E66.811 CLASS 1 OBESITY: Status: RESOLVED | Noted: 2022-04-06 | Resolved: 2025-03-31

## 2025-03-31 PROBLEM — E66.813 CLASS 3 SEVERE OBESITY WITH SERIOUS COMORBIDITY IN ADULT: Status: RESOLVED | Noted: 2022-06-21 | Resolved: 2025-03-31

## 2025-03-31 PROBLEM — J30.9 ALLERGIC RHINITIS: Status: ACTIVE | Noted: 2024-12-04

## 2025-03-31 PROBLEM — E66.01 CLASS 3 SEVERE OBESITY WITH SERIOUS COMORBIDITY IN ADULT: Status: RESOLVED | Noted: 2022-06-21 | Resolved: 2025-03-31

## 2025-03-31 PROBLEM — S82.122A: Status: RESOLVED | Noted: 2024-01-23 | Resolved: 2025-03-31

## 2025-03-31 PROBLEM — Z87.01 HISTORY OF PSEUDOMONAS PNEUMONIA: Status: RESOLVED | Noted: 2022-08-25 | Resolved: 2025-03-31

## 2025-03-31 PROBLEM — S31.809A WOUND OF GLUTEAL CLEFT: Status: RESOLVED | Noted: 2025-01-23 | Resolved: 2025-03-31

## 2025-03-31 PROBLEM — R74.8 ELEVATED ALKALINE PHOSPHATASE LEVEL: Status: RESOLVED | Noted: 2022-06-21 | Resolved: 2025-03-31

## 2025-03-31 PROBLEM — K59.09 OTHER CONSTIPATION: Status: RESOLVED | Noted: 2023-02-07 | Resolved: 2025-03-31

## 2025-03-31 PROBLEM — N18.32 STAGE 3B CHRONIC KIDNEY DISEASE: Status: RESOLVED | Noted: 2023-02-07 | Resolved: 2025-03-31

## 2025-03-31 PROBLEM — U07.1 PNEUMONIA DUE TO COVID-19 VIRUS: Status: RESOLVED | Noted: 2021-10-19 | Resolved: 2025-03-31

## 2025-03-31 PROBLEM — Z86.2 HISTORY OF ANEMIA DUE TO CHRONIC KIDNEY DISEASE: Status: RESOLVED | Noted: 2023-01-13 | Resolved: 2025-03-31

## 2025-03-31 PROBLEM — G47.33 OBSTRUCTIVE SLEEP APNEA: Status: ACTIVE | Noted: 2023-03-20

## 2025-03-31 PROBLEM — M00.9 SEPTIC JOINT: Status: RESOLVED | Noted: 2024-02-16 | Resolved: 2025-03-31

## 2025-03-31 PROBLEM — Z86.711 PERSONAL HISTORY OF PE (PULMONARY EMBOLISM): Status: RESOLVED | Noted: 2023-02-09 | Resolved: 2025-03-31

## 2025-03-31 PROBLEM — M00.9 SEPTIC ARTHRITIS: Status: RESOLVED | Noted: 2024-02-16 | Resolved: 2025-03-31

## 2025-03-31 PROBLEM — F17.201 TOBACCO ABUSE, IN REMISSION: Status: RESOLVED | Noted: 2022-08-25 | Resolved: 2025-03-31

## 2025-03-31 PROBLEM — E11.51 TYPE 2 DIABETES MELLITUS WITH DIABETIC PERIPHERAL ANGIOPATHY WITHOUT GANGRENE, WITH LONG-TERM CURRENT USE OF INSULIN: Chronic | Status: ACTIVE | Noted: 2023-03-20

## 2025-03-31 PROBLEM — R06.2 WHEEZING: Status: RESOLVED | Noted: 2024-04-04 | Resolved: 2025-03-31

## 2025-03-31 PROBLEM — Z79.899 LONG-TERM USE OF HIGH-RISK MEDICATION: Status: RESOLVED | Noted: 2023-02-07 | Resolved: 2025-03-31

## 2025-03-31 PROBLEM — J96.01 ACUTE HYPOXIC ON CHRONIC HYPERCAPNIC RESPIRATORY FAILURE: Status: RESOLVED | Noted: 2024-04-28 | Resolved: 2025-03-31

## 2025-03-31 PROBLEM — J47.9 BRONCHIECTASIS WITHOUT COMPLICATION: Status: RESOLVED | Noted: 2022-08-25 | Resolved: 2025-03-31

## 2025-03-31 PROBLEM — Z91.89 AT RISK FOR VENOUS THROMBOEMBOLISM (VTE): Status: RESOLVED | Noted: 2024-02-26 | Resolved: 2025-03-31

## 2025-03-31 PROBLEM — Z93.3 COLOSTOMY STATUS: Status: RESOLVED | Noted: 2025-02-17 | Resolved: 2025-03-31

## 2025-03-31 PROBLEM — Z99.81 OXYGEN DEPENDENT: Status: ACTIVE | Noted: 2022-09-23

## 2025-03-31 PROCEDURE — 99213 OFFICE O/P EST LOW 20 MIN: CPT | Performed by: NURSE PRACTITIONER

## 2025-03-31 RX ORDER — INSULIN DETEMIR 100 [IU]/ML
5 INJECTION, SOLUTION SUBCUTANEOUS NIGHTLY
Qty: 15 ML | Refills: 1 | OUTPATIENT
Start: 2025-03-31 | End: 2025-09-27

## 2025-03-31 NOTE — OUTREACH NOTE
[facility] End of Month Documentation    This Chronic Medical Management Care Plan for Preston Wallis, 59 y.o. male, has been monitored and managed; reviewed and a new plan of care implemented for the month of March.  A cumulative time of 33  minutes was spent on this patient record this month, including phone call with care giver; electronic communication with primary care provider; electronic communication with pharmacist; chart review.    Regarding the patient's problems: has Chronic cough; Pneumonia due to COVID-19 virus; Polyneuropathy; Paroxysmal atrial fibrillation; Obstructive sleep apnea; MRSA pneumonia; Low back pain; Chronic diastolic (congestive) heart failure; Allergies; COPD exacerbation; Chronic anticoagulation; Benign prostatic hyperplasia; Difficulty using continuous positive airway pressure (CPAP) device; Stage 3a chronic kidney disease; Iron deficiency anemia secondary to inadequate dietary iron intake; Vitamin D deficiency; Class 3 severe obesity with serious comorbidity in adult; Lower extremity edema; Elevated alkaline phosphatase level; Venous insufficiency (chronic) (peripheral); Tobacco abuse, in remission; History of Pseudomonas pneumonia; Chronic dyspnea; Gastroesophageal reflux disease; Bronchiectasis without complication; ERIC (acute kidney injury); Altered mental status; History of anemia due to chronic kidney disease; Hyperlipidemia; Luetscher's syndrome; Neurogenic bladder; Class 1 obesity; Pneumonia due to Pseudomonas species; Seizures; Primary osteoarthritis of left knee; Other constipation; Chronic obstructive pulmonary disease; Type 2 diabetes mellitus with hyperglycemia, with long-term current use of insulin; Essential hypertension; Stage 3b chronic kidney disease; Annual physical exam; Long-term use of high-risk medication; Personal history of PE (pulmonary embolism); Encounter for aftercare for healing closed traumatic fracture of left femur; Chronic pain of left knee; Primary  osteoarthritis of right knee; Sepsis; Bronchiectasis with acute exacerbation; Bacterial pneumonia; Anemia; Closed fracture of left tibial plateau; Septic joint; Septic arthritis; Skin ulcer of left heel, limited to breakdown of skin; Ulcer of left foot, limited to breakdown of skin; Left foot pain; Wheezing; Acute on chronic respiratory failure with hypercapnia; Chronic respiratory failure with hypoxia and hypercapnia; Acute hypoxic on chronic hypercapnic respiratory failure; Impaired cognition; Atherosclerosis of native arteries of the extremities with ulceration; PNA (pneumonia); Perianal abscess; 1. Incision and drainage of posterior perianal abscess 2. Sharp excisional debridement through skin and subcutaneous tissue in the posterior perianal area, 9 x 6 x 1 cm; Wound of gluteal cleft; Chronic kidney disease, stage IV (severe); Colostomy status; and GI bleed on their problem list., the following items were addressed: medications; transitions to medical care; medical records; referrals to community service providers and any changes can be found within the plan section of the note.  A detailed listing of time spent for chronic care management is tracked within each outreach encounter.  Current medications include:  has a current medication list which includes the following prescription(s): arformoterol, aspirin low dose, atorvastatin, budesonide, buspirone, calcium citrate, vitamin d3, santyl, diltiazem cd, docusate sodium, eliquis, farxiga, ferrous gluconate, finasteride, folic acid, basaglar kwikpen, insulin lispro (1 unit dial), ipratropium-albuterol, magnesium oxide -mg supplement, montelukast, gnp one daily plus iron, pantoprazole, betadine, ozempic (1 mg/dose), anasept, anasept, sodium hypochlorite, tamsulosin, tiotropium, tobramycin pf, and vitamin c. and the patient is reported to be caregiver will take responsibility for med compliance; patient is compliant with medication protocol,  Medications are  reported to be non-effective in controlling symptoms and changes have been made to the medication protocol.  Regarding these diagnoses, referrals were made to the following provider(s):  specialists.  All notes on chart for PCP to review.    The patient was monitored remotely for pain; medications; blood glucose; mood & behavior; activity level.    The patient's physical needs include:  help taking medications as prescribed; medication education; needs assistance with ADLs; resources for disability needs; DME supplies.     The patient's mental support needs include:  continued support    The patient's cognitive support needs include:  medication; continued support; needs assistance with ADLs; health care    The patient's psychosocial support needs include:  continued support; coordination of community providers; medication management or adherence, Has great supportive family.    The patient's functional needs include: health care coverage; medication education; needs assistance for ADLs; physical healthcare; physician referral; resources for disability needs, Limited mobility.    The patient's environmental needs include:  resources for disability needs; no involvement in outside activities or no access to other activities    Care Plan overall comments:  Still not at goal, however improving. will continue to follow. Has many upcoming appointments and referrals to specialists. A1c not at goal, continuing ways to improve with addition of new medications. Will follow, new hospitalization.    Refer to previous outreach notes for more information on the areas listed above.    Monthly Billing Diagnoses  (E11.65,  Z79.4) Type 2 diabetes mellitus with hyperglycemia, with long-term current use of insulin    (N18.4) Chronic kidney disease, stage IV (severe)    (J96.11,  J96.12) Chronic respiratory failure with hypoxia and hypercapnia    (J44.1) Chronic obstructive pulmonary disease with acute exacerbation    Medications    Medications have been reconciled    Care Plan progress this month:      Recently Modified Care Plans Updates made since 2/28/2025 12:00 AM      No recently modified care plans.             Chronic Kidney       Track and Manage My Symptoms (Not Progressing)       Start:  12/06/24    Expected End:  06/06/25         My Symptom Management To Do List       Start:  12/06/24         Why is this important?  Keeping track of symptoms can help you feel the best. It also helps the doctor stay on top of any changes to the disease. It may also help keep your disease from getting worse. Taking simple steps can help you cope with  symptoms like feeling very tired or itchy skin.              Follow My Treatment Plan (Progressing)       Start:  12/06/24    Expected End:  06/06/25         My Treatment Plan To Do List       Start:  12/06/24         Why is this important?  Staying as healthy as you can is very important. This may mean making changes if you smoke, don't exercise or eat poorly.  A healthy lifestyle is an important goal for you.  Following the treatment plan and making changes may be hard.  Try some of these steps to help keep the disease from getting worse.              Manage My Diet (Not Progressing)       Start:  12/06/24    Expected End:  06/06/25         My Diet Management To Do List       Start:  12/06/24         Why is this important?  A healthy diet is important for mental and physical health.  Healthy food helps repair damaged body tissue and maintains strong bones and muscles.  No single food is just right so eating a variety of proteins, fruits, vegetables and grains is best.  You may need to change what you eat or drink to manage kidney disease.  A dietitian is the best person to guide you.              Optimal Care Coordination of a Patient Experiencing Chronic Kidney Disease (Progressing)       Start:  12/06/24    Expected End:  06/06/25         Support Psychological Response to Chronic Kidney Disease        Start:  12/06/24          Initial    Support Psychological Response to Chronic Kidney Disease: caregiver stress acknowledged;caregiver support provided;goal setting facilitated;positive reinforcement provided;self-care encouraged taken at 12/06/24         Facilitate Activities to Optimize Comfort and Well-Being       Start:  12/06/24          Initial    Facilitate Activities to Optimize Comfort and Well-Being: alternating periods of activity with rest promoted;sleep-hygiene practices encouraged taken at 12/06/24         Alleviate Barriers to Chronic Kidney Disease Treatment       Start:  12/06/24          Initial    Alleviate Barriers to Chronic Kidney Disease Treatment: medication side effects managed;barriers to treatment adherence identified;activity or exercise based on tolerance encouraged Reached out to nephrology office to review labs and any interventions needed.  Sent abnormal labs to oncall provider and pcp. taken at 12/06/24         Maintain or Improve Strength or Functional Ability       Start:  12/06/24          Initial    Maintain or Improve Strength or Functional Ability: activity or exercise based on tolerance encouraged;assistive or adaptive device use encouraged taken at 12/06/24         Alleviate Barriers to Optimal Nutrition Status       Start:  12/06/24          Initial    Alleviate Barriers to Optimal Nutrition Status: diversity of foods encouraged;support and encouragement provided taken at 12/06/24         Facilitate Multi-Modal Pain Management       Start:  12/06/24          Initial    Facilitate Multi-Modal Pain Management: pain assessed taken at 12/06/24         Develop and Maintain Palliative Care Plan       Start:  12/06/24                Current Specialty Plan of Care Status signed by both patient and provider    Instructions   Patient was provided an electronic copy of care plan  CCM services were explained and offered and patient has accepted these services.  Patient has given  their written consent to receive CCM services and understands that this includes the authorization of electronic communication of medical information with the other treating providers.  Patient understands that they may stop CCM services at any time and these changes will be effective at the end of the calendar month and will effectively revocate the agreement of CCM services.  Patient understands that only one practitioner can furnish and be paid for CCM services during one calendar month.  Patient also understands that there may be co-payment or deductible fees in association with CCM services.  Patient will continue with at least monthly follow-up calls with the Ambulatory .    Tara GOMEZ  Ambulatory Case Management    3/31/2025, 09:04 EDT

## 2025-03-31 NOTE — PROGRESS NOTES
"Chief Complaint: Establish Care, Urinary Retention, and Neurologic Problem    Subjective         History of Present Illness  Preston Wallis is a 59 y.o. male presents to [facility] UROLOG[facility] to be seen for urinary retention.    Patient previously established with Dr. White last seen in May 2024.    Patient diagnosed with diabetic neurogenic bladder with no contraction of urinary bladder and had a Lopez catheter placed previously.    The patient's wife was catheterizing the patient 4 times a day however unsure when specifically he had lopez anchored.     He was at [facility] for some time.     He does have care tenders and nobody has changed the catheter.  Again the patient is unsure how long this has been in place.    They request to have the catheter removed due to wife cathing him this works out better.    His wife states he did have a UTI while in the long term care facility. I have no records of this.    His wife is cathing 4-6 x a day.         Objective     Past Medical History:   Diagnosis Date    1. Incision and drainage of posterior perianal abscess 2. Sharp excisional debridement through skin and subcutaneous tissue in the posterior perianal area, 9 x 6 x 1 cm 01/26/2025    Age-related cognitive decline     Allergic contact dermatitis     Allergies     Anemia     Bedbound     11/2023 \"MY LEG MUSCLES STOPPED WORKING\"    Bronchiectasis with acute lower respiratory infection     Charcot foot due to diabetes mellitus 09/10/2013    Chronic diastolic (congestive) heart failure     Chronic kidney disease     Chronic respiratory failure with hypoxia     Closed supracondylar fracture of femur 01/12/2022    COPD (chronic obstructive pulmonary disease)     Deep vein thrombosis (DVT) of lower extremity associated with air travel 01/13/2023    Dependence on supplemental oxygen     Eczema     Erectile dysfunction     due to organic reasons    Essential (primary) hypertension     Fracture     " "closed fracture of other tarsal and metatarsal bones    Fracture of proximal humerus 01/13/2023    GERD without esophagitis     High risk medication use     Hypercholesteremia     Hypomagnesemia     Infected stasis ulcer of left lower extremity 01/13/2023    Insomnia     Low back pain     Major depressive disorder     Morbid (severe) obesity due to excess calories     MRSA pneumonia     Muscle weakness     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 without evidence of necrosis     Obstructive sleep apnea (adult) (pediatric)     On home O2     REPORTS WEARING 2L/NC AAT    Other forms of dyspnea     Other long term (current) drug therapy     Other specified noninfective gastroenteritis and colitis     Other spondylosis, lumbar region     Pain in both knees     Paroxysmal atrial fibrillation     Peripheral neuropathy     attributed to type 2 diabetes    Pneumonia, unspecified organism     Polyneuropathy     Rash and other nonspecific skin eruption     Self-catheterizes urinary bladder     EVERY 4 HOURS    Smoking     \"SOMETIMES\"    Syncope and collapse     Tachycardia     Tinnitus 01/13/2023    Type 1 diabetes mellitus with diabetic chronic kidney disease     Type 2 diabetes mellitus     Unspecified fall, initial encounter     Urinary retention        Past Surgical History:   Procedure Laterality Date    BRONCHOSCOPY N/A 1/28/2025    Procedure: BRONCHOSCOPY: BAL: insertion of lighted instrument to view inside the lung;  Surgeon: Walter Nicole DO;  Location: [facility] MAIN OR;  Service: Pulmonary;  Laterality: N/A;    BRONCHOSCOPY N/A 2/3/2025    Procedure: BRONCHOSCOPY WITH BRONCHOALVEOLAR LAVAGE, POSSIBLE BIOPSY, BRUSHING, WASHING, AIRWAY INSPECTION: insertion of lighted instrument to view inside the lung;  Surgeon: Chadd Swan MD;  Location: [facility] MAIN OR;  Service: Pulmonary;  Laterality: N/A;    CARDIAC CATHETERIZATION Left 8/15/2024    Procedure: Carbon " dioxide aortogram with left leg angiogram, possible angioplasty or stenting;  Surgeon: Moshe Willson MD;  Location: [facility] CATH INVASIVE LOCATION;  Service: Vascular;  Laterality: Left;    CHOLECYSTECTOMY      COLOSTOMY N/A 2/6/2025    Procedure: COLOSTOMY LAPAROSCOPIC; plain text: creation of colostomy using small incisions;  Surgeon: Emerson Canada MD;  Location: [facility] OR OSC;  Service: General;  Laterality: N/A;    CYSTOSCOPY      ENDOSCOPY N/A 3/18/2025    Procedure: ESOPHAGOGASTRODUODENOSCOPY WITH BIOPSIES;  Surgeon: Rafael Hernandez MD;  Location: [facility] ENDOSCOPY;  Service: Gastroenterology;  Laterality: N/A;  HIATAL HERNIA, REFLUX ESOPHAGITIS    FEMUR SURGERY Left     Shravan placed    INCISION AND DRAINAGE ABSCESS N/A 1/26/2025    Procedure: INCISION AND DRAINAGE ABSCESS; plain text: incision and drain pus from buttocks wound;  Surgeon: Emerson Canada MD;  Location: [facility] OR [facility];  Service: General;  Laterality: N/A;    KNEE SURGERY Left     OTHER SURGICAL HISTORY Left     venous port, REMOVED    PORTACATH PLACEMENT Right     TIBIAL PLATEAU OPEN REDUCTION INTERNAL FIXATION Left 12/22/2023    Procedure: TIBIAL PLATEAU OPEN REDUCTION INTERNAL FIXATION;  Surgeon: Hugo Kline MD;  Location: [facility] OR;  Service: Orthopedics;  Laterality: Left;    TONSILLECTOMY AND ADENOIDECTOMY           Current Outpatient Medications:     arformoterol (BROVANA) 15 MCG/2ML nebulizer solution, Take 2 mL by nebulization 2 (Two) Times a Day., Disp: 360 mL, Rfl: 3    Aspirin Low Dose 81 MG EC tablet, TAKE 1 TABLET BY MOUTH EVERY MORNING, Disp: 30 tablet, Rfl: 3    atorvastatin (LIPITOR) 20 MG tablet, TAKE 1 TABLET BY MOUTH EVERY NIGHT AT BEDTIME, Disp: 30 tablet, Rfl: 3    budesonide (Pulmicort) 0.5 MG/2ML nebulizer solution, Take 2 mL by nebulization 2 (Two) Times a Day., Disp: 360 mL, Rfl: 3    busPIRone (BUSPAR) 15 MG tablet, TAKE 1 TABLET BY MOUTH TWICE DAILY, Disp: 60 tablet, Rfl: 3    calcium citrate  (CALCITRATE) 950 (200 Ca) MG tablet, TAKE 1 TABLET BY MOUTH EVERY NIGHT AT BEDTIME, Disp: 30 tablet, Rfl: 3    Cholecalciferol (Vitamin D3) 50 MCG (2000 UT) tablet, TAKE 1 TABLET BY MOUTH EVERY MORNING, Disp: 30 tablet, Rfl: 3    collagenase (Santyl) 250 UNIT/GM ointment, Apply 1 Application topically to the appropriate area as directed Daily., Disp: 30 g, Rfl: 1    dilTIAZem CD (CARDIZEM CD) 240 MG 24 hr capsule, Take 1 capsule by mouth Daily for 30 days., Disp: 30 capsule, Rfl: 0    docusate sodium (COLACE) 100 MG capsule, TAKE 1 CAPSULE BY MOUTH TWICE DAILY, Disp: 60 capsule, Rfl: 3    Eliquis 5 MG tablet tablet, TAKE 1 TABLET BY MOUTH EVERY TWELVE HOURS, Disp: 60 tablet, Rfl: 3    Farxiga 5 MG tablet tablet, TAKE 1 TABLET BY MOUTH EVERY MORNING, Disp: 30 tablet, Rfl: 3    ferrous gluconate (FERGON) 324 MG tablet, TAKE 1 TABLET BY MOUTH EVERY MORNING, Disp: 30 tablet, Rfl: 3    finasteride (PROSCAR) 5 MG tablet, TAKE 1 TABLET BY MOUTH EVERY NIGHT AT BEDTIME, Disp: 30 tablet, Rfl: 3    folic acid (FOLVITE) 1 MG tablet, TAKE 1 TABLET BY MOUTH EVERY NIGHT AT BEDTIME, Disp: 30 tablet, Rfl: 3    Insulin Glargine (BASAGLAR KWIKPEN) 100 UNIT/ML injection pen, Inject 15 Units under the skin into the appropriate area as directed Daily for 180 days., Disp: 15 mL, Rfl: 1    Insulin Lispro, 1 Unit Dial, (HUMALOG) 100 UNIT/ML solution pen-injector, Inject 5 Units under the skin into the appropriate area as directed 3 (Three) Times a Day Before Meals., Disp: 15 mL, Rfl: 0    ipratropium-albuterol (DUO-NEB) 0.5-2.5 mg/3 ml nebulizer, Take 3 mL by nebulization Every 4 (Four) Hours As Needed for Wheezing or Shortness of Air., Disp: 360 mL, Rfl: 5    Magnesium Oxide -Mg Supplement 400 (240 Mg) MG tablet, Take 1 tablet by mouth every night at bedtime., Disp: , Rfl:     montelukast (SINGULAIR) 10 MG tablet, Take 1 tablet by mouth Every Night., Disp: 30 tablet, Rfl: 5    Multiple Vitamins-Iron (GNP One Daily Plus Iron) tablet, TAKE 1  TABLET BY MOUTH EVERY MORNING, Disp: 30 each, Rfl: 3    pantoprazole (PROTONIX) 40 MG EC tablet, Take 1 tablet by mouth Every Morning., Disp: 30 tablet, Rfl: 1    Povidone-Iodine (Betadine) 5 % external solution 5%, Apply 1 mL topically to the appropriate area as directed 2 (Two) Times a Day. Left Heel, Disp: , Rfl:     Semaglutide, 1 MG/DOSE, (Ozempic, 1 MG/DOSE,) 4 MG/3ML solution pen-injector, Inject 1 mg under the skin into the appropriate area as directed 1 (One) Time Per Week. (Patient taking differently: Inject 1 mg under the skin into the appropriate area as directed 1 (One) Time Per Week. Saturday), Disp: 3 mL, Rfl: 5    Sodium Hypochlorite (Anasept) 0.057 % liquid, Apply 1 Application topically Daily. Sacrum, Disp: , Rfl:     Sodium Hypochlorite (Anasept) 0.057 % liquid, Apply 1 Application topically As Needed (This is in addition to the QD scheduled application). Sacrum, Disp: , Rfl:     sodium hypochlorite (DAKIN'S 1/4 STRENGTH) 0.125 % solution topical solution 0.125%, Apply 10 mL topically to the appropriate area as directed 2 (Two) Times a Day., Disp: 1000 mL, Rfl: 1    tamsulosin (FLOMAX) 0.4 MG capsule 24 hr capsule, TAKE 1 CAPSULE BY MOUTH EVERY NIGHT AT BEDTIME, Disp: 30 capsule, Rfl: 3    tiotropium (SPIRIVA) 18 MCG per inhalation capsule, Place 1 capsule into inhaler and inhale Daily., Disp: 30 capsule, Rfl: 5    tobramycin PF (MOIZ) 300 MG/5ML nebulizer solution, Take 5 mL by nebulization 2 (Two) Times a Day for 28 days. nebulize 1 vial BY MOUTH TWICE DAILY FOR 28 DAYS ON AND 28 DAYS OFF, Disp: , Rfl:     vitamin C (ASCORBIC ACID) 500 MG tablet, TAKE 1 TABLET BY MOUTH TWICE DAILY, Disp: 60 tablet, Rfl: 3    Allergies   Allergen Reactions    Benadryl [Diphenhydramine] Itching    Proventil [Albuterol] Other (See Comments)     Mouth sores          Family History   Problem Relation Age of Onset    Coronary artery disease Mother     Hypertension Mother     Diabetes type II Mother     Asthma Father   "   Diabetes type II Sister     Cancer Sister     Malig Hyperthermia Neg Hx        Social History     Socioeconomic History    Marital status:    Tobacco Use    Smoking status: Former     Current packs/day: 0.00     Average packs/day: 1 pack/day for 12.0 years (12.0 ttl pk-yrs)     Types: Cigarettes     Start date:      Quit date:      Years since quittin.2     Passive exposure: Past    Smokeless tobacco: Never   Vaping Use    Vaping status: Never Used   Substance and Sexual Activity    Alcohol use: Not Currently    Drug use: Never    Sexual activity: Defer       Vital Signs:   Ht 175.3 cm (69.02\")   Wt 103 kg (227 lb)   BMI 33.51 kg/m²      Physical Exam     Result Review :   The following data was reviewed by: CON Abrams on 2025:  Results for orders placed or performed during the hospital encounter of 25   Comprehensive Metabolic Panel    Collection Time: 25  2:43 PM    Specimen: Blood   Result Value Ref Range    Glucose 124 (H) 65 - 99 mg/dL    BUN 20 6 - 20 mg/dL    Creatinine 2.09 (H) 0.76 - 1.27 mg/dL    Sodium 140 136 - 145 mmol/L    Potassium 3.8 3.5 - 5.2 mmol/L    Chloride 101 98 - 107 mmol/L    CO2 28.9 22.0 - 29.0 mmol/L    Calcium 9.2 8.6 - 10.5 mg/dL    Total Protein 7.3 6.0 - 8.5 g/dL    Albumin 2.9 (L) 3.5 - 5.2 g/dL    ALT (SGPT) 30 1 - 41 U/L    AST (SGOT) 34 1 - 40 U/L    Alkaline Phosphatase 196 (H) 39 - 117 U/L    Total Bilirubin 0.2 0.0 - 1.2 mg/dL    Globulin 4.4 gm/dL    A/G Ratio 0.7 g/dL    BUN/Creatinine Ratio 9.6 7.0 - 25.0    Anion Gap 10.1 5.0 - 15.0 mmol/L    eGFR 35.8 (L) >60.0 mL/min/1.73   BNP    Collection Time: 25  2:43 PM    Specimen: Blood   Result Value Ref Range    proBNP 1,518.0 (H) 0.0 - 900.0 pg/mL   High Sensitivity Troponin T    Collection Time: 25  2:43 PM    Specimen: Blood   Result Value Ref Range    HS Troponin T 132 (C) <22 ng/L   Lipase    Collection Time: 25  2:43 PM    Specimen: Blood   Result " Value Ref Range    Lipase 7 (L) 13 - 60 U/L   Lactic Acid, Plasma    Collection Time: 03/16/25  2:43 PM    Specimen: Blood   Result Value Ref Range    Lactate 1.2 0.5 - 2.0 mmol/L   CBC Auto Differential    Collection Time: 03/16/25  2:43 PM    Specimen: Blood   Result Value Ref Range    WBC 11.72 (H) 3.40 - 10.80 10*3/mm3    RBC 3.35 (L) 4.14 - 5.80 10*6/mm3    Hemoglobin 9.2 (L) 13.0 - 17.7 g/dL    Hematocrit 29.9 (L) 37.5 - 51.0 %    MCV 89.3 79.0 - 97.0 fL    MCH 27.5 26.6 - 33.0 pg    MCHC 30.8 (L) 31.5 - 35.7 g/dL    RDW 15.2 12.3 - 15.4 %    RDW-SD 49.2 37.0 - 54.0 fl    MPV 8.7 6.0 - 12.0 fL    Platelets 516 (H) 140 - 450 10*3/mm3    Neutrophil % 72.5 42.7 - 76.0 %    Lymphocyte % 14.2 (L) 19.6 - 45.3 %    Monocyte % 7.6 5.0 - 12.0 %    Eosinophil % 4.8 0.3 - 6.2 %    Basophil % 0.6 0.0 - 1.5 %    Immature Grans % 0.3 0.0 - 0.5 %    Neutrophils, Absolute 8.50 (H) 1.70 - 7.00 10*3/mm3    Lymphocytes, Absolute 1.66 0.70 - 3.10 10*3/mm3    Monocytes, Absolute 0.89 0.10 - 0.90 10*3/mm3    Eosinophils, Absolute 0.56 (H) 0.00 - 0.40 10*3/mm3    Basophils, Absolute 0.07 0.00 - 0.20 10*3/mm3    Immature Grans, Absolute 0.04 0.00 - 0.05 10*3/mm3    nRBC 0.0 0.0 - 0.2 /100 WBC   Protime-INR    Collection Time: 03/16/25  2:43 PM    Specimen: Blood   Result Value Ref Range    Protime 16.5 (H) 11.8 - 14.9 Seconds    INR 1.27 (H) 0.86 - 1.15   Green Top (Gel)    Collection Time: 03/16/25  2:43 PM   Result Value Ref Range    Extra Tube Hold for add-ons.    Lavender Top    Collection Time: 03/16/25  2:43 PM   Result Value Ref Range    Extra Tube hold for add-on    Gold Top - SST    Collection Time: 03/16/25  2:43 PM   Result Value Ref Range    Extra Tube Hold for add-ons.    Light Blue Top    Collection Time: 03/16/25  2:43 PM   Result Value Ref Range    Extra Tube Hold for add-ons.    ECG 12 Lead ED Triage Standing Order; SOA    Collection Time: 03/16/25  3:10 PM   Result Value Ref Range    QT Interval 337 ms    QTC  Interval 457 ms   High Sensitivity Troponin T 1Hr    Collection Time: 03/16/25  3:55 PM    Specimen: Blood   Result Value Ref Range    HS Troponin T 125 (C) <22 ng/L    Troponin T Numeric Delta -7 ng/L    Troponin T % Delta -5 Abnormal if >/= 20%   Urinalysis With Microscopic If Indicated (No Culture) - Indwelling Urethral Catheter    Collection Time: 03/16/25  4:04 PM    Specimen: Indwelling Urethral Catheter; Urine   Result Value Ref Range    Color, UA Yellow Yellow, Straw    Appearance, UA Clear Clear    pH, UA 7.0 5.0 - 8.0    Specific Gravity, UA 1.014 1.005 - 1.030    Glucose,  mg/dL (Trace) (A) Negative    Ketones, UA Trace (A) Negative    Bilirubin, UA Negative Negative    Blood, UA Moderate (2+) (A) Negative    Protein, UA >=300 mg/dL (3+) (A) Negative    Leuk Esterase, UA Trace (A) Negative    Nitrite, UA Negative Negative    Urobilinogen, UA 0.2 E.U./dL 0.2 - 1.0 E.U./dL   Urinalysis, Microscopic Only - Indwelling Urethral Catheter    Collection Time: 03/16/25  4:04 PM    Specimen: Indwelling Urethral Catheter; Urine   Result Value Ref Range    RBC, UA 6-10 (A) None Seen, 0-2 /HPF    WBC, UA 3-5 (A) None Seen, 0-2 /HPF    Bacteria, UA 1+ (A) None Seen /HPF    Squamous Epithelial Cells, UA None Seen None Seen, 0-2 /HPF    Yeast, UA Large/3+ Budding Yeast (A) None Seen /HPF    Hyaline Casts, UA 7-12 None Seen /LPF    Methodology Manual Light Microscopy    aPTT    Collection Time: 03/16/25  7:17 PM    Specimen: Hand, Left; Blood   Result Value Ref Range    PTT 37.2 (H) 24.2 - 34.2 seconds   Type & Screen    Collection Time: 03/16/25  7:17 PM    Specimen: Hand, Left; Blood   Result Value Ref Range    ABO Type O     RH type Negative     Antibody Screen Negative     T&S Expiration Date 3/19/2025 11:59:59 PM    POC Glucose Once    Collection Time: 03/16/25  8:16 PM    Specimen: Blood   Result Value Ref Range    Glucose 96 70 - 99 mg/dL   Hemoglobin & Hematocrit, Blood    Collection Time: 03/17/25 12:03 AM     Specimen: Arm, Right; Blood   Result Value Ref Range    Hemoglobin 8.3 (L) 13.0 - 17.7 g/dL    Hematocrit 27.2 (L) 37.5 - 51.0 %   Comprehensive Metabolic Panel    Collection Time: 03/17/25  2:13 AM    Specimen: Arm, Right; Blood   Result Value Ref Range    Glucose 105 (H) 65 - 99 mg/dL    BUN 20 6 - 20 mg/dL    Creatinine 1.90 (H) 0.76 - 1.27 mg/dL    Sodium 141 136 - 145 mmol/L    Potassium 3.6 3.5 - 5.2 mmol/L    Chloride 102 98 - 107 mmol/L    CO2 27.7 22.0 - 29.0 mmol/L    Calcium 8.4 (L) 8.6 - 10.5 mg/dL    Total Protein 6.7 6.0 - 8.5 g/dL    Albumin 2.6 (L) 3.5 - 5.2 g/dL    ALT (SGPT) 29 1 - 41 U/L    AST (SGOT) 35 1 - 40 U/L    Alkaline Phosphatase 167 (H) 39 - 117 U/L    Total Bilirubin 0.2 0.0 - 1.2 mg/dL    Globulin 4.1 gm/dL    A/G Ratio 0.6 g/dL    BUN/Creatinine Ratio 10.5 7.0 - 25.0    Anion Gap 11.3 5.0 - 15.0 mmol/L    eGFR 40.1 (L) >60.0 mL/min/1.73   CANDIDA AURIS PCR - Swab, Axilla Right, Axilla Left and Groin    Collection Time: 03/17/25  7:18 AM    Specimen: Axilla Right, Axilla Left and Groin; Swab   Result Value Ref Range    CANDIDA AURIS PCR Not Detected Not Detected   Hemoglobin & Hematocrit, Blood    Collection Time: 03/17/25  7:48 AM    Specimen: Hand, Left; Blood   Result Value Ref Range    Hemoglobin 8.2 (L) 13.0 - 17.7 g/dL    Hematocrit 27.0 (L) 37.5 - 51.0 %   POC Glucose Once    Collection Time: 03/17/25  8:06 AM    Specimen: Blood   Result Value Ref Range    Glucose 116 (H) 70 - 99 mg/dL   POC Glucose Once    Collection Time: 03/17/25 11:06 AM    Specimen: Blood   Result Value Ref Range    Glucose 117 (H) 70 - 99 mg/dL   POC Glucose Once    Collection Time: 03/17/25  4:11 PM    Specimen: Blood   Result Value Ref Range    Glucose 99 70 - 99 mg/dL   Hemoglobin & Hematocrit, Blood    Collection Time: 03/17/25  5:09 PM    Specimen: Arm, Left; Blood   Result Value Ref Range    Hemoglobin 9.4 (L) 13.0 - 17.7 g/dL    Hematocrit 30.4 (L) 37.5 - 51.0 %   POC Glucose Once    Collection  Time: 03/17/25  7:36 PM    Specimen: Blood   Result Value Ref Range    Glucose 162 (H) 70 - 99 mg/dL   CBC (No Diff)    Collection Time: 03/18/25  6:15 AM    Specimen: Arm, Left; Blood   Result Value Ref Range    WBC 10.77 3.40 - 10.80 10*3/mm3    RBC 3.08 (L) 4.14 - 5.80 10*6/mm3    Hemoglobin 8.7 (L) 13.0 - 17.7 g/dL    Hematocrit 27.6 (L) 37.5 - 51.0 %    MCV 89.6 79.0 - 97.0 fL    MCH 28.2 26.6 - 33.0 pg    MCHC 31.5 31.5 - 35.7 g/dL    RDW 14.9 12.3 - 15.4 %    RDW-SD 48.9 37.0 - 54.0 fl    MPV 8.1 6.0 - 12.0 fL    Platelets 437 140 - 450 10*3/mm3   Basic Metabolic Panel    Collection Time: 03/18/25  6:15 AM    Specimen: Arm, Left; Blood   Result Value Ref Range    Glucose 136 (H) 65 - 99 mg/dL    BUN 19 6 - 20 mg/dL    Creatinine 2.01 (H) 0.76 - 1.27 mg/dL    Sodium 138 136 - 145 mmol/L    Potassium 3.5 3.5 - 5.2 mmol/L    Chloride 101 98 - 107 mmol/L    CO2 29.0 22.0 - 29.0 mmol/L    Calcium 8.7 8.6 - 10.5 mg/dL    BUN/Creatinine Ratio 9.5 7.0 - 25.0    Anion Gap 8.0 5.0 - 15.0 mmol/L    eGFR 37.5 (L) >60.0 mL/min/1.73   POC Glucose Once    Collection Time: 03/18/25  7:39 AM    Specimen: Blood   Result Value Ref Range    Glucose 112 (H) 70 - 99 mg/dL   POC Glucose Once    Collection Time: 03/18/25 11:02 AM    Specimen: Blood   Result Value Ref Range    Glucose 189 (H) 70 - 99 mg/dL   Tissue Pathology Exam    Collection Time: 03/18/25  5:01 PM    Specimen: Esophagus, Distal; Tissue   Result Value Ref Range    Case Report       Surgical Pathology Report                         Case: [id number]                                  Authorizing Provider:  Rafael Hernandez MD    Collected:           03/18/2025 05:01 PM          Ordering Location:     [facility] Received:            03/19/2025 07:36 AM                                 SUITES                                                                       Pathologist:           Alix De La Fuente MD                                               "       Specimen:    Esophagus, Distal, DISTAL ESOPHAGUS BIOPSIES                                               Clinical Information       Gastrointestinal hemorrhage, unspecified gastrointestinal hemorrhage type      Final Diagnosis       Distal esophagus, biopsy:   - Squamocolumnar mucosa with intestinal metaplasia   - Negative for dysplasia and malignancy        Gross Description       1. Esophagus, Distal.  Received in formalin and labeled \" distal esophagus\" is a 0.7 cm aggregate of tan soft tissue fragments. The specimen is entirely submitted in one cassette.   MIKE      Microscopic Description       Microscopic examination performed.     POC Glucose Once    Collection Time: 03/18/25  6:20 PM    Specimen: Blood   Result Value Ref Range    Glucose 124 (H) 70 - 99 mg/dL   POC Glucose Once    Collection Time: 03/18/25  9:46 PM    Specimen: Blood   Result Value Ref Range    Glucose 157 (H) 70 - 99 mg/dL   CBC (No Diff)    Collection Time: 03/19/25  5:34 AM    Specimen: Blood   Result Value Ref Range    WBC 9.05 3.40 - 10.80 10*3/mm3    RBC 3.45 (L) 4.14 - 5.80 10*6/mm3    Hemoglobin 9.6 (L) 13.0 - 17.7 g/dL    Hematocrit 30.8 (L) 37.5 - 51.0 %    MCV 89.3 79.0 - 97.0 fL    MCH 27.8 26.6 - 33.0 pg    MCHC 31.2 (L) 31.5 - 35.7 g/dL    RDW 14.6 12.3 - 15.4 %    RDW-SD 47.8 37.0 - 54.0 fl    MPV 8.6 6.0 - 12.0 fL    Platelets 479 (H) 140 - 450 10*3/mm3   Basic Metabolic Panel    Collection Time: 03/19/25  5:34 AM    Specimen: Blood   Result Value Ref Range    Glucose 130 (H) 65 - 99 mg/dL    BUN 26 (H) 6 - 20 mg/dL    Creatinine 1.87 (H) 0.76 - 1.27 mg/dL    Sodium 137 136 - 145 mmol/L    Potassium 3.7 3.5 - 5.2 mmol/L    Chloride 101 98 - 107 mmol/L    CO2 28.3 22.0 - 29.0 mmol/L    Calcium 9.4 8.6 - 10.5 mg/dL    BUN/Creatinine Ratio 13.9 7.0 - 25.0    Anion Gap 7.7 5.0 - 15.0 mmol/L    eGFR 40.9 (L) >60.0 mL/min/1.73   POC Glucose 4x Daily Before Meals & at Bedtime    Collection Time: 03/19/25  7:41 AM    Specimen: " Blood   Result Value Ref Range    Glucose 122 (H) 70 - 99 mg/dL   POC Glucose 4x Daily Before Meals & at Bedtime    Collection Time: 03/19/25 12:03 PM    Specimen: Blood   Result Value Ref Range    Glucose 154 (H) 70 - 99 mg/dL   POC Glucose 4x Daily Before Meals & at Bedtime    Collection Time: 03/19/25  5:06 PM    Specimen: Blood   Result Value Ref Range    Glucose 116 (H) 70 - 99 mg/dL     *Note: Due to a large number of results and/or encounters for the requested time period, some results have not been displayed. A complete set of results can be found in Results Review.      PSA          7/18/2024    13:55   PSA   PSA 0.206          Procedures        Assessment and Plan    Diagnoses and all orders for this visit:    1. Urinary retention (Primary)    2. Neurogenic bladder, flaccid    3. Intermittent self-catheterization of bladder      Patient will have cath removed by HH.     Will order 14 [place] coude cath as required due to inability to pass a a straight cath form BPH.     He will cath 6x a day.    He does require closed cath system for reduction of UTIs.     He will also require a kit for cathing as his wife is performing this      I spent 15 minutes caring for Preston on this date of service. This time includes time spent by me in the following activities:reviewing tests, obtaining and/or reviewing a separately obtained history, performing a medically appropriate examination and/or evaluation , counseling and educating the patient/family/caregiver, ordering medications, tests, or procedures, and documenting information in the medical record  Follow Up   Return in about 1 year (around 3/31/2026) for annual f/u neurogenic bladder.  Patient was given instructions and counseling regarding his condition or for health maintenance advice. Please see specific information pulled into the AVS if appropriate.         This document has been electronically signed by CON Abrams  March 31, 2025 14:37 EDT        Statement Selected

## 2025-04-01 ENCOUNTER — OFFICE VISIT (OUTPATIENT)
Dept: FAMILY MEDICINE CLINIC | Age: 60
End: 2025-04-01
Payer: COMMERCIAL

## 2025-04-01 ENCOUNTER — PATIENT OUTREACH (OUTPATIENT)
Dept: CASE MANAGEMENT | Facility: OTHER | Age: 60
End: 2025-04-01
Payer: COMMERCIAL

## 2025-04-01 VITALS
DIASTOLIC BLOOD PRESSURE: 49 MMHG | TEMPERATURE: 98.2 F | HEART RATE: 87 BPM | SYSTOLIC BLOOD PRESSURE: 138 MMHG | HEIGHT: 69 IN | BODY MASS INDEX: 33.51 KG/M2 | OXYGEN SATURATION: 97 %

## 2025-04-01 DIAGNOSIS — N18.32 STAGE 3B CHRONIC KIDNEY DISEASE (CKD): ICD-10-CM

## 2025-04-01 DIAGNOSIS — A49.8 BACTERIAL INFECTION DUE TO PSEUDOMONAS: ICD-10-CM

## 2025-04-01 DIAGNOSIS — G62.9 POLYNEUROPATHY: ICD-10-CM

## 2025-04-01 DIAGNOSIS — I50.32 CHRONIC DIASTOLIC (CONGESTIVE) HEART FAILURE: ICD-10-CM

## 2025-04-01 DIAGNOSIS — E11.65 UNCONTROLLED TYPE 2 DIABETES MELLITUS WITH HYPERGLYCEMIA: ICD-10-CM

## 2025-04-01 DIAGNOSIS — G47.33 OBSTRUCTIVE SLEEP APNEA: ICD-10-CM

## 2025-04-01 DIAGNOSIS — I87.8 POOR VENOUS ACCESS: Primary | ICD-10-CM

## 2025-04-01 DIAGNOSIS — I48.0 PAROXYSMAL ATRIAL FIBRILLATION: ICD-10-CM

## 2025-04-01 DIAGNOSIS — F41.9 ANXIETY AND DEPRESSION: ICD-10-CM

## 2025-04-01 DIAGNOSIS — Z79.4 TYPE 2 DIABETES MELLITUS WITH HYPERGLYCEMIA, WITH LONG-TERM CURRENT USE OF INSULIN: Primary | ICD-10-CM

## 2025-04-01 DIAGNOSIS — Z95.828 HISTORY OF INSERTION OF CENTRAL VENOUS ACCESS PORT: ICD-10-CM

## 2025-04-01 DIAGNOSIS — R79.89 ELEVATED LFTS: ICD-10-CM

## 2025-04-01 DIAGNOSIS — K92.0 HEMATEMESIS WITHOUT NAUSEA: Primary | ICD-10-CM

## 2025-04-01 DIAGNOSIS — F32.A ANXIETY AND DEPRESSION: ICD-10-CM

## 2025-04-01 DIAGNOSIS — L97.521 ULCER OF LEFT FOOT, LIMITED TO BREAKDOWN OF SKIN: ICD-10-CM

## 2025-04-01 DIAGNOSIS — L89.303 PRESSURE INJURY OF BUTTOCK, STAGE 3, UNSPECIFIED LATERALITY: ICD-10-CM

## 2025-04-01 DIAGNOSIS — J44.1 COPD EXACERBATION: ICD-10-CM

## 2025-04-01 DIAGNOSIS — E11.65 TYPE 2 DIABETES MELLITUS WITH HYPERGLYCEMIA, WITH LONG-TERM CURRENT USE OF INSULIN: Primary | ICD-10-CM

## 2025-04-01 DIAGNOSIS — I10 ESSENTIAL HYPERTENSION: ICD-10-CM

## 2025-04-01 RX ORDER — DULOXETIN HYDROCHLORIDE 30 MG/1
30 CAPSULE, DELAYED RELEASE ORAL DAILY
Qty: 30 CAPSULE | Refills: 5 | Status: SHIPPED | OUTPATIENT
Start: 2025-04-01

## 2025-04-01 RX ORDER — ARGININE/GLUTAMINE/CALCIUM BMB 7G-7G-1.5G
1 POWDER IN PACKET (EA) ORAL 2 TIMES DAILY
Qty: 180 EACH | Refills: 1 | Status: SHIPPED | OUTPATIENT
Start: 2025-04-01

## 2025-04-01 NOTE — ASSESSMENT & PLAN NOTE
Overall stable on 2 L O2 nasal cannula and permanent pulmonary toilet treatment.  He is follow-up with pulmonology as directed

## 2025-04-01 NOTE — ASSESSMENT & PLAN NOTE
He is follow-up with cardiology as directed.  No lower extremity edema, heart palpitations, chest pain, or pleural effusion and good breath sounds on physical exam.  No changes to current meds or treatment plan.  He is back on aspirin and Eliquis

## 2025-04-01 NOTE — ASSESSMENT & PLAN NOTE
He is follow-up with cardiology as directed.  No lower extremity edema or pleural effusion and good breath sounds on physical exam.  No changes to current meds or treatment plan

## 2025-04-01 NOTE — OUTREACH NOTE
AMBULATORY CASE MANAGEMENT NOTE    Names and Relationships of Patient/Support Persons: Contact: [employer] (port supplies); Relationship:  -     Following up on appointment today with PCP.  Uncertain of last port flush.  Called [employer] and they can no longer maintain supplies, will need to go to outpatient center for flushing.  Called [facility] to help with order so we can get him scheduled.      Tara GOMEZ  Ambulatory Case Management    4/1/2025, 11:54 EDT    CCM Interim Update    Faxed outpatient infusion order to [facility].

## 2025-04-01 NOTE — PROGRESS NOTES
Preston Wallis presents to [facility].    Chief Complaint: Transition of care from recent hospitalization 3/16/2025 to 3/19/2025    Subjective     History of Present Illness:    I am seeing Shamika Wallis today to follow-up for recent admission.  He was admitted on 3/16/2025 and discharged on 3/19/2025.  Discharge summary was performed by Bin Kirby and he was admitted to a [facility] facility.  His discharge summary includes that he is a 59-year-old male with underlying COPD chronically dependent on 2 L of oxygen, along with obstructive sleep apnea, hypertension, atrial fibrillation on Eliquis blood thinner, insulin-dependent type 2 diabetes, chronic Pseudomonas pneumonia and he presented to the hospital ER with coffee-ground emesis.  He was also tachycardic and had high blood pressure.  His hemoglobin was 9.2 and stable, at baseline.  He was given IV fluids and started on an IV PPI and his Eliquis and aspirin were held.  GI was consulted and an EGD was performed which showed esophagitis without active bleeding.  Biopsies were taken and sent for pathology.  While in the hospital his hemoglobin remained stable.  He tolerated p.o. intake.  His labs showed an iron of 31, ferritin of 608 and lactic acid of 1.2.  He was discharged home on an oral PPI pantoprazole 40 mg daily.  Other new medications on discharge include diltiazem 240 mg daily for blood pressure    In addition he has a chronic perianal wound and was consulted with general surgery who did not feel like debridement was warranted, he has started wound care while in the hospital and underwent dressing changes.  He did not want to go back to his skilled nursing facility so he was discharged back home.  Wound care did not feel like he needed a VAC placement.  Home health is now in the home helping with his wound care.  He is now getting topical Betadine applications to the wound    He is on atorvastatin for hypercholesterolemia and  tolerates medication well     he suffers from chronic anxiety and is stable on buspirone    His chronic COPD is currently stable on 2 L of oxygen.  For his chronic Pseudomonas pneumonia he is on tobramycin nebulizer.  For pulmonary toilet he does Brovana nebulizer, DuoNeb nebulizer, budesonide nebulizer, tiotropium.  He also takes Singulair for chronic allergies and COPD.    For his type 2 diabetes insulin-dependent he is currently on lispro 5 units 3 times daily with meals and Levemir 5 units subcu nightly, Ozempic 1 mg weekly, Farxiga 5 mg daily.  Hemoglobin A1c in the hospital was 7.0%, improved from 8.8%.  His endocrinologist Neli Paredes change his Levemir to insulin glargine 15 units nightly.    He presents with chronic atrial fibrillation and hypertension.  He is currently stable on Eliquis 5 mg twice daily/aspirin 81 mg daily and this was resumed at discharge.  He is not currently on a beta-blocker.    He has BPH and is stable on Flomax 0.4 mg every 24 hours.  He has a urinary catheter in place at this time.  Urine is clear no signs of infection    He is on vitamin C and vitamin D and zinc supplementation    He has iron deficiency anemia which remained stable while in the hospital and he is on iron supplementation 324 mg daily and folic acid 1 mg daily    He does get chronic constipation is stable on stool softener  mg twice daily     The beneficiary is completely immobile and cannot make changes in body position without assistance and he cannot independently make changes in body position significant enough to alleviate pressure and he does have impaired nutritional status, urinary inconinence, altered sensory perception and compromised circulatory status.    For the wheelchair the pressure ulcer needs to be mentioned in the note and and that he is confined ot chair.  Would benefit from Roho style cushion.         Result Review   The following data was reviewed by Kimmy Riley MD on  04/01/2025.  Lab Results   Component Value Date    WBC 9.05 03/19/2025    HGB 9.6 (L) 03/19/2025    HCT 30.8 (L) 03/19/2025    MCV 89.3 03/19/2025     (H) 03/19/2025     Lab Results   Component Value Date    GLUCOSE 130 (H) 03/19/2025    BUN 26 (H) 03/19/2025    CREATININE 1.87 (H) 03/19/2025     03/19/2025    K 3.7 03/19/2025     03/19/2025    CALCIUM 9.4 03/19/2025    PROTEINTOT 6.7 03/17/2025    ALBUMIN 2.6 (L) 03/17/2025    ALT 29 03/17/2025    AST 35 03/17/2025    ALKPHOS 167 (H) 03/17/2025    BILITOT 0.2 03/17/2025    GLOB 4.1 03/17/2025    AGRATIO 0.6 03/17/2025    BCR 13.9 03/19/2025    ANIONGAP 7.7 03/19/2025    EGFR 40.9 (L) 03/19/2025     Lab Results   Component Value Date    CHOL 116 01/25/2025    CHLPL 165 08/26/2020    TRIG 49 01/25/2025    HDL 54 01/25/2025    LDL 50 01/25/2025     Lab Results   Component Value Date    TSH 1.497 04/13/2024     Lab Results   Component Value Date    HGBA1C 7.0 (A) 03/14/2025     Lab Results   Component Value Date    PSA 0.206 07/18/2024    PSA 0.203 05/25/2023    PSA 0.725 03/17/2022     Lab Results   Component Value Date    Iron 31 (L) 01/31/2025    Iron Saturation 13 (L) 03/17/2020    Iron Saturation (TSAT) 17 (L) 01/31/2025      Lab Results   Component Value Date    SQKY19IS 41.4 07/18/2024               Assessment and Plan:   Assessment & Plan  Hematemesis without nausea  Symptoms have resolved.  No more vomiting or nausea.  Pending biopsy results from the EGD.  All Pepcid now on Protonix       Uncontrolled type 2 diabetes mellitus with hyperglycemia    He is doing much better.  Now on insulin glargine 15 units nightly and sliding scale insulin as well as Farxiga.  In addition he is on Ozempic.  Tolerates meds well.  Recent hemoglobin A1c is 7%       Anxiety and depression    Anxiety and depression are worse with this recent decline in health, skilled nursing unit visit and hospitalization.  At this stage I will start him on Cymbalta.  He is  bedridden or wheelchair ridden and needs a eggcrate cushion due to his stage III buttock pressure ulcer.  His nutrition is poor over the last few months.  He needs more protein intake so I will put him on a protein supplement.      Orders:    DULoxetine (Cymbalta) 30 MG capsule; Take 1 capsule by mouth Daily.    Essential hypertension    Overall stable and well-controlled at this time.  No changes to current meds or treatment plan       Elevated LFTs  We will recheck labs       Bacterial infection due to Pseudomonas  Continue tobramycin nebulizer       Paroxysmal atrial fibrillation  He is follow-up with cardiology as directed.  No lower extremity edema, heart palpitations, chest pain, or pleural effusion and good breath sounds on physical exam.  No changes to current meds or treatment plan.  He is back on aspirin and Eliquis       Ulcer of left foot, limited to breakdown of skin  He has a stage III diabetic foot ulcer, currently under treatment with home health and wound care, recommend increasing protein intake through protein supplement  Orders:    Nutritional Supplements (Rosalino Nutrivigor) pack; Take 1 packet by mouth 2 (Two) Times a Day.    Stage 3b chronic kidney disease (CKD)    He is to avoid NSAIDs and push fluids 64 ounces a day       COPD exacerbation    Overall stable on 2 L O2 nasal cannula and permanent pulmonary toilet treatment.  He is follow-up with pulmonology as directed       Chronic diastolic (congestive) heart failure    He is follow-up with cardiology as directed.  No lower extremity edema or pleural effusion and good breath sounds on physical exam.  No changes to current meds or treatment plan           Obstructive sleep apnea  Continue CPAP at night       Polyneuropathy  He has a stage III diabetic foot ulcer, currently under treatment with home health and wound care       Pressure injury of buttock, stage 3, unspecified laterality  He is bedridden or wheelchair ridden and needs a eggcrate  cushion due to his stage III buttock pressure ulcer.  His nutrition is poor over the last few months.  He needs more protein intake so I will put him on a protein supplement.     The beneficiary is completely immobile and cannot make changes in body position without assistance and he cannot independently make changes in body position significant enough to alleviate pressure and he does have impaired nutritional status, urinary inconinence, altered sensory perception and compromised circulatory status.    For the wheelchair the pressure ulcer needs to be mentioned in the note and and that he is confined ot chair.  Would benefit from Roho style cushion.       Orders:    Nutritional Supplements (Rosalino Nutrivigor) pack; Take 1 packet by mouth 2 (Two) Times a Day.    History of insertion of central venous access port  Will get him port care at the hospital once a month                  Objective     Medications:  Current Outpatient Medications   Medication Instructions    arformoterol (BROVANA) 15 mcg, Nebulization, 2 Times Daily - RT    Aspirin Low Dose 81 mg, Oral, Every Morning    atorvastatin (LIPITOR) 20 mg, Oral, Every Night at Bedtime    BASAGLAR KWIKPEN 15 Units, Subcutaneous, Daily    budesonide (PULMICORT) 0.5 mg, Nebulization, 2 Times Daily    busPIRone (BUSPAR) 15 mg, Oral, 2 Times Daily    calcium citrate (CALCITRATE) 950 mg, Oral, Every Night at Bedtime    collagenase (Santyl) 250 UNIT/GM ointment 1 Application, Topical, Daily    dilTIAZem CD (CARDIZEM CD) 240 mg, Oral, Every 24 Hours Scheduled    docusate sodium (COLACE) 100 mg, Oral, 2 Times Daily    DULoxetine (CYMBALTA) 30 mg, Oral, Daily    Eliquis 5 mg, Oral, Every 12 Hours    Farxiga 5 mg, Oral, Every Morning    ferrous gluconate (FERGON) 324 mg, Oral, Every Morning    finasteride (PROSCAR) 5 mg, Oral, Every Night at Bedtime    folic acid (FOLVITE) 1,000 mcg, Oral, Every Night at Bedtime    Insulin Lispro (1 Unit Dial) (HUMALOG) 5 Units,  "Subcutaneous, 3 Times Daily Before Meals    ipratropium-albuterol (DUO-NEB) 0.5-2.5 mg/3 ml nebulizer 3 mL, Nebulization, Every 4 Hours PRN    Magnesium Oxide -Mg Supplement 400 (240 Mg) MG tablet 1 tablet, Every Night at Bedtime    montelukast (SINGULAIR) 10 mg, Oral, Nightly    Multiple Vitamins-Iron (GNP One Daily Plus Iron) tablet 1 tablet, Oral, Every Morning    Nutritional Supplements (Rosalino Nutrivigor) pack 1 packet, Oral, 2 Times Daily    Ozempic (1 MG/DOSE) 1 mg, Subcutaneous, Weekly    pantoprazole (PROTONIX) 40 mg, Oral, Every Early Morning    Povidone-Iodine (Betadine) 5 % external solution 5% 1 Application, 2 Times Daily    Sodium Hypochlorite (Anasept) 0.057 % liquid 1 Application, Daily    Sodium Hypochlorite (Anasept) 0.057 % liquid 1 Application, As Needed    sodium hypochlorite (DAKIN'S 1/4 STRENGTH) 0.125 % solution topical solution 0.125% 10 mL, Topical, 2 Times Daily    tamsulosin (FLOMAX) 0.4 mg, Oral, Every Night at Bedtime    tiotropium (SPIRIVA) 18 MCG per inhalation capsule 1 capsule, Inhalation, Daily - RT    tobramycin PF (MOIZ) 300 mg, Nebulization, 2 Times Daily - RT, nebulize 1 vial BY MOUTH TWICE DAILY FOR 28 DAYS ON AND 28 DAYS OFF    vitamin C (ASCORBIC ACID) 500 mg, Oral, 2 Times Daily    Vitamin D3 50 mcg, Oral, Every Morning        Vital Signs:   /49 (BP Location: Left arm, Patient Position: Sitting, Cuff Size: Large Adult)   Pulse 87   Temp 98.2 °F (36.8 °C) (Temporal)   Ht 175.3 cm (69.02\")   SpO2 97%   BMI 33.51 kg/m²           BP Readings from Last 3 Encounters:   04/01/25 138/49   03/25/25 135/45   03/19/25 139/58      Wt Readings from Last 3 Encounters:   03/31/25 103 kg (227 lb)   03/16/25 104 kg (228 lb 13.4 oz)   03/14/25 103 kg (228 lb)        Physical Exam:  Physical Exam  Vitals and nursing note reviewed.   Constitutional:       General: He is not in acute distress.     Appearance: Normal appearance. He is obese. He is not ill-appearing, toxic-appearing or " diaphoretic.   HENT:      Head: Normocephalic and atraumatic.      Right Ear: Tympanic membrane, ear canal and external ear normal.      Left Ear: Tympanic membrane, ear canal and external ear normal.      Nose: No congestion or rhinorrhea.      Mouth/Throat:      Mouth: Mucous membranes are moist.      Pharynx: Oropharynx is clear. No oropharyngeal exudate or posterior oropharyngeal erythema.   Eyes:      Extraocular Movements: Extraocular movements intact.      Conjunctiva/sclera: Conjunctivae normal.      Pupils: Pupils are equal, round, and reactive to light.   Cardiovascular:      Rate and Rhythm: Normal rate and regular rhythm.      Heart sounds: Normal heart sounds. No murmur heard.  Pulmonary:      Effort: Pulmonary effort is normal.      Breath sounds: Normal breath sounds. No wheezing, rhonchi or rales.   Abdominal:      General: Abdomen is flat.      Palpations: Abdomen is soft. There is no mass.      Tenderness: There is no abdominal tenderness.      Hernia: No hernia is present.   Musculoskeletal:      Cervical back: Neck supple. No rigidity.      Right lower leg: No edema.      Left lower leg: No edema.   Lymphadenopathy:      Cervical: No cervical adenopathy.   Skin:     General: Skin is warm and dry.          Neurological:      Mental Status: He is alert and oriented to person, place, and time. Mental status is at baseline.      Motor: Weakness present.      Gait: Gait abnormal.   Psychiatric:         Mood and Affect: Mood normal.         Behavior: Behavior normal.         Thought Content: Thought content normal.         Judgment: Judgment normal.           Review of Systems:  Review of Systems   Constitutional:  Positive for fatigue. Negative for chills and fever.   HENT:  Negative for congestion, ear discharge and sore throat.    Respiratory:  Negative for cough, shortness of breath and wheezing.    Cardiovascular:  Negative for chest pain, palpitations and leg swelling.   Gastrointestinal:   "Negative for abdominal pain, constipation, diarrhea, nausea, vomiting and GERD.   Genitourinary:  Negative for flank pain.   Skin:  Positive for wound.   Neurological:  Positive for weakness. Negative for dizziness and headache.   Psychiatric/Behavioral:  Positive for depressed mood. Negative for suicidal ideas. The patient is nervous/anxious.               Follow Up   Return if symptoms worsen or fail to improve.    Part of this note may be an electronic transcription/translation of spoken language to printed   text using the Dragon Dictation System.              Health Maintenance   Topic Date Due    URINE MICROALBUMIN-CREATININE RATIO (uACR)  07/27/2022    DIABETIC EYE EXAM  11/01/2024    INFLUENZA VACCINE  07/01/2025    ANNUAL PHYSICAL  07/11/2025    HEMOGLOBIN A1C  09/14/2025    LIPID PANEL  01/25/2026    COLORECTAL CANCER SCREENING  09/08/2027    TDAP/TD VACCINES (2 - Td or Tdap) 09/18/2028    HEPATITIS C SCREENING  Completed    COVID-19 Vaccine  Completed    Pneumococcal Vaccine 50+  Completed    ZOSTER VACCINE  Completed    Hepatitis B  Discontinued          Medical History:  There are no discontinued medications.   Past Medical History:    1. Incision and drainage of posterior perianal abscess 2. Sharp excisional debridement through skin and subcutaneous tissue in the posterior perianal area, 9 x 6 x 1 cm    Age-related cognitive decline    Allergic contact dermatitis    Allergies    Anemia    Bedbound    11/2023 \"MY LEG MUSCLES STOPPED WORKING\"    Bronchiectasis with acute lower respiratory infection    Charcot foot due to diabetes mellitus    Chronic diastolic (congestive) heart failure    Chronic kidney disease    Chronic respiratory failure with hypoxia    Closed supracondylar fracture of femur    COPD (chronic obstructive pulmonary disease)    Deep vein thrombosis (DVT) of lower extremity associated with air travel    Dependence on supplemental oxygen    Eczema    Erectile dysfunction    due to organic " "reasons    Essential (primary) hypertension    Fracture    closed fracture of other tarsal and metatarsal bones    Fracture of proximal humerus    GERD without esophagitis    High risk medication use    Hypercholesteremia    Hypomagnesemia    Infected stasis ulcer of left lower extremity    Insomnia    Low back pain    Major depressive disorder    Morbid (severe) obesity due to excess calories    MRSA pneumonia    Muscle weakness    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 without evidence of necrosis    Obstructive sleep apnea (adult) (pediatric)    On home O2    REPORTS WEARING 2L/NC AAT    Other forms of dyspnea    Other long term (current) drug therapy    Other specified noninfective gastroenteritis and colitis    Other spondylosis, lumbar region    Pain in both knees    Paroxysmal atrial fibrillation    Peripheral neuropathy    attributed to type 2 diabetes    Pneumonia, unspecified organism    Polyneuropathy    Rash and other nonspecific skin eruption    Self-catheterizes urinary bladder    EVERY 4 HOURS    Smoking    \"SOMETIMES\"    Syncope and collapse    Tachycardia    Tinnitus    Type 1 diabetes mellitus with diabetic chronic kidney disease    Type 2 diabetes mellitus    Unspecified fall, initial encounter    Urinary retention     Past Surgical History:    BRONCHOSCOPY    Procedure: BRONCHOSCOPY: BAL: insertion of lighted instrument to view inside the lung;  Surgeon: Walter Nicole DO;  Location: [facility] MAIN OR;  Service: Pulmonary;  Laterality: N/A;    BRONCHOSCOPY    Procedure: BRONCHOSCOPY WITH BRONCHOALVEOLAR LAVAGE, POSSIBLE BIOPSY, BRUSHING, WASHING, AIRWAY INSPECTION: insertion of lighted instrument to view inside the lung;  Surgeon: Chadd Swan MD;  Location: [facility] MAIN OR;  Service: Pulmonary;  Laterality: N/A;    CARDIAC CATHETERIZATION    Procedure: Carbon dioxide aortogram with left leg angiogram, possible angioplasty or stenting;  " Surgeon: Moshe Willson MD;  Location: [facility] CATH INVASIVE LOCATION;  Service: Vascular;  Laterality: Left;    CHOLECYSTECTOMY    COLOSTOMY    Procedure: COLOSTOMY LAPAROSCOPIC; plain text: creation of colostomy using small incisions;  Surgeon: Emerson Canada MD;  Location: [facility] OR [facility];  Service: General;  Laterality: N/A;    CYSTOSCOPY    ENDOSCOPY    Procedure: ESOPHAGOGASTRODUODENOSCOPY WITH BIOPSIES;  Surgeon: Rafael Hernandez MD;  Location: [facility] ENDOSCOPY;  Service: Gastroenterology;  Laterality: N/A;  HIATAL HERNIA, REFLUX ESOPHAGITIS    FEMUR SURGERY    Shravan placed    INCISION AND DRAINAGE ABSCESS    Procedure: INCISION AND DRAINAGE ABSCESS; plain text: incision and drain pus from buttocks wound;  Surgeon: Emerson Canada MD;  Location: [facility] OR [facility];  Service: General;  Laterality: N/A;    KNEE SURGERY    OTHER SURGICAL HISTORY    venous port, REMOVED    PORTACATH PLACEMENT    TIBIAL PLATEAU OPEN REDUCTION INTERNAL FIXATION    Procedure: TIBIAL PLATEAU OPEN REDUCTION INTERNAL FIXATION;  Surgeon: Hugo Kline MD;  Location: [facility];  Service: Orthopedics;  Laterality: Left;    TONSILLECTOMY AND ADENOIDECTOMY      Family History   Problem Relation Age of Onset    Coronary artery disease Mother     Hypertension Mother     Diabetes type II Mother     Asthma Father     Diabetes type II Sister     Cancer Sister     Malig Hyperthermia Neg Hx      Social History     Tobacco Use    Smoking status: Former     Current packs/day: 0.00     Average packs/day: 1 pack/day for 12.0 years (12.0 ttl pk-yrs)     Types: Cigarettes     Start date:      Quit date:      Years since quittin.2     Passive exposure: Past    Smokeless tobacco: Never   Substance Use Topics    Alcohol use: Not Currently       Health Maintenance Due   Topic Date Due    URINE MICROALBUMIN-CREATININE RATIO (uACR)  2022    DIABETIC EYE EXAM  2024        Immunization History   Administered Date(s)  Administered    COVID-19 (PFIZER) 12YRS+ (COMIRNATY) 10/22/2024    Flu Vaccine Quad PF >36MO 10/18/2016, 10/16/2017, 11/04/2019    Flu Vaccine Split Quad 11/04/2019    Fluzone  >6mos 09/19/2013    Fluzone (or Fluarix & Flulaval for VFC) >6mos 09/19/2013, 10/18/2016, 10/16/2017, 11/04/2019, 10/19/2022, 11/14/2023    Fluzone High-Dose 65+YRS 10/22/2024    Influenza Injectable Mdck Pf Quad 10/19/2022    Influenza, Unspecified 10/18/2016, 10/16/2017, 11/04/2019, 09/21/2020    PEDS-Pneumococcal Conjugate (PCV7) 11/14/2023    Pneumococcal Conjugate 20-Valent (PCV20) 11/14/2023    Pneumococcal Polysaccharide (PPSV23) 11/20/1997    Shingrix 07/11/2024, 10/22/2024    Tdap 09/18/2018    influenza Split 11/04/2019       Allergies   Allergen Reactions    Benadryl [Diphenhydramine] Itching    Proventil [Albuterol] Other (See Comments)     Mouth sores

## 2025-04-01 NOTE — ASSESSMENT & PLAN NOTE
Overall stable and well-controlled at this time.  No changes to current meds or treatment plan

## 2025-04-01 NOTE — ASSESSMENT & PLAN NOTE
He has a stage III diabetic foot ulcer, currently under treatment with home health and wound care

## 2025-04-02 DIAGNOSIS — J96.11 CHRONIC RESPIRATORY FAILURE WITH HYPOXIA AND HYPERCAPNIA: ICD-10-CM

## 2025-04-02 DIAGNOSIS — J44.1 COPD EXACERBATION: Primary | ICD-10-CM

## 2025-04-02 DIAGNOSIS — Z79.4 CONTROLLED TYPE 2 DIABETES MELLITUS WITH DIABETIC POLYNEUROPATHY, WITH LONG-TERM CURRENT USE OF INSULIN: Primary | ICD-10-CM

## 2025-04-02 DIAGNOSIS — J96.12 CHRONIC RESPIRATORY FAILURE WITH HYPOXIA AND HYPERCAPNIA: ICD-10-CM

## 2025-04-02 DIAGNOSIS — E11.42 CONTROLLED TYPE 2 DIABETES MELLITUS WITH DIABETIC POLYNEUROPATHY, WITH LONG-TERM CURRENT USE OF INSULIN: Primary | ICD-10-CM

## 2025-04-02 RX ORDER — INSULIN GLARGINE 100 [IU]/ML
15 INJECTION, SOLUTION SUBCUTANEOUS DAILY
Qty: 15 ML | Refills: 1 | Status: SHIPPED | OUTPATIENT
Start: 2025-04-02 | End: 2025-09-29

## 2025-04-02 NOTE — TELEPHONE ENCOUNTER
[facility] CANNOT ORDER INSULIN GLARGINE FROM THEIR SUPPLIER. OK PER PATIENT TO SEND TO [facility] IN [place].

## 2025-04-02 NOTE — TELEPHONE ENCOUNTER
Patient went to [facility], the pharmacy is stating that it needs to be preauthorized.  She is trying to pick it up before all the rain sets in

## 2025-04-03 ENCOUNTER — TELEPHONE (OUTPATIENT)
Dept: UROLOGY | Age: 60
End: 2025-04-03
Payer: COMMERCIAL

## 2025-04-03 ENCOUNTER — PATIENT OUTREACH (OUTPATIENT)
Dept: CASE MANAGEMENT | Facility: OTHER | Age: 60
End: 2025-04-03
Payer: COMMERCIAL

## 2025-04-03 DIAGNOSIS — L97.521 ULCER OF LEFT FOOT, LIMITED TO BREAKDOWN OF SKIN: ICD-10-CM

## 2025-04-03 DIAGNOSIS — N18.31 STAGE 3A CHRONIC KIDNEY DISEASE: Primary | ICD-10-CM

## 2025-04-03 DIAGNOSIS — E11.65 UNCONTROLLED TYPE 2 DIABETES MELLITUS WITH HYPERGLYCEMIA: ICD-10-CM

## 2025-04-03 DIAGNOSIS — L89.303 PRESSURE INJURY OF BUTTOCK, STAGE 3, UNSPECIFIED LATERALITY: ICD-10-CM

## 2025-04-03 DIAGNOSIS — R29.898 WEAKNESS OF BOTH LOWER EXTREMITIES: Primary | ICD-10-CM

## 2025-04-03 NOTE — TELEPHONE ENCOUNTER
PATIENT'S WIFE, INGA, CALLED.  SHE SAID WHEN PATIENT SAW [facility] ON 03/17/25, THEY DISCUSSED GETTING CATHETERS.  WIFE SAID SHE TOLD HER THAT THEY USE [employer], BUT WE DIDN'T LIKE THAT COMPANY.  SHE WANTS TO KNOW IF WE FOUND A COMPANY.  SHE SAID HE NEEDS CATHETERS AND COLOSTOMY BAGS.  SHE SAID WE CAN ORDER THE CATHETERS, AND THE COMPANY MAY BE ABLE TO HIS COLOSTOMY BAGS TOO.  SHE SAID WE CAN LET HER KNOW WHAT COMPANY IT IS.    #[phone]

## 2025-04-03 NOTE — TELEPHONE ENCOUNTER
LM for the patient to let her know that the Rep with [facility] (Jared) told us that he was already communicating with her to get this ordered. I'm not sure what else we are to do. HUB okay to relay

## 2025-04-03 NOTE — OUTREACH NOTE
AMBULATORY CASE MANAGEMENT NOTE    Names and Relationships of Patient/Support Persons: Contact: Caretenirving Hagen; Relationship:   Contact: aKmi Wallis; Relationship: Emergency Contact -     Reached out to [facility] to see who is seeing Gómez now.  Xiao -[phone]  Called and left message with nurse to give her lab orders   Called Elizabeth to confirm the date of the appointment , 4/11/25.    Still waiting on mattress and neb orders.     Education Documentation  No documentation found.        Tara GOMEZ  Ambulatory Case Management    4/3/2025, 10:20 EDT

## 2025-04-03 NOTE — TELEPHONE ENCOUNTER
Spoke with pharmacy - insurance now prefers lantus over basaglar. Ok to switch to lantus per Fernandez Wallis APRN.    PA not required

## 2025-04-04 ENCOUNTER — TELEPHONE (OUTPATIENT)
Dept: CASE MANAGEMENT | Facility: OTHER | Age: 60
End: 2025-04-04
Payer: COMMERCIAL

## 2025-04-04 DIAGNOSIS — N18.31 STAGE 3A CHRONIC KIDNEY DISEASE: Primary | ICD-10-CM

## 2025-04-04 NOTE — TELEPHONE ENCOUNTER
Faxed orders for mattress, cushion and nebulizer supplies ( signed) along with addended office note to Bog[facility]

## 2025-04-07 NOTE — TELEPHONE ENCOUNTER
PATIENT'S WIFE CALLED.  MESSAGE WAS RELAYED TO HER.    SHE SAID SHE IS NOT AWARE OF TALKING TO ANYONE WITH [facility].    PLEASE CALL HER [phone].

## 2025-04-07 NOTE — TELEPHONE ENCOUNTER
LM for patient's wife, I spoke with the Rep Jared and he has said that they have been talking, but he was going to reach out to them again. Jared will be in our office tomorrow and I will get another update. HUB okay to relay.

## 2025-04-15 ENCOUNTER — OUTSIDE FACILITY SERVICE (OUTPATIENT)
Dept: FAMILY MEDICINE CLINIC | Age: 60
End: 2025-04-15
Payer: MEDICAID

## 2025-04-16 ENCOUNTER — TELEPHONE (OUTPATIENT)
Dept: SURGERY | Facility: CLINIC | Age: 60
End: 2025-04-16
Payer: COMMERCIAL

## 2025-04-16 NOTE — TELEPHONE ENCOUNTER
JOSE CALLED FROM [facility]  TO F/U ON GETTING AN OSTOMY SUPPLY ORDER.      HE ASKED FOR TAD TO CALL HIM -[phone].

## 2025-04-17 ENCOUNTER — OFFICE VISIT (OUTPATIENT)
Age: 60
End: 2025-04-17
Payer: COMMERCIAL

## 2025-04-17 ENCOUNTER — LAB REQUISITION (OUTPATIENT)
Dept: LAB | Facility: HOSPITAL | Age: 60
End: 2025-04-17
Payer: COMMERCIAL

## 2025-04-17 ENCOUNTER — RESULTS FOLLOW-UP (OUTPATIENT)
Dept: LAB | Facility: HOSPITAL | Age: 60
End: 2025-04-17
Payer: COMMERCIAL

## 2025-04-17 VITALS
SYSTOLIC BLOOD PRESSURE: 148 MMHG | HEART RATE: 95 BPM | BODY MASS INDEX: 33.51 KG/M2 | DIASTOLIC BLOOD PRESSURE: 75 MMHG | WEIGHT: 227 LBS

## 2025-04-17 DIAGNOSIS — D72.829 LEUKOCYTOSIS, UNSPECIFIED TYPE: Primary | ICD-10-CM

## 2025-04-17 DIAGNOSIS — I50.32 CHRONIC DIASTOLIC (CONGESTIVE) HEART FAILURE: ICD-10-CM

## 2025-04-17 DIAGNOSIS — Z79.4 TYPE 2 DIABETES MELLITUS WITH HYPERGLYCEMIA, WITH LONG-TERM CURRENT USE OF INSULIN: ICD-10-CM

## 2025-04-17 DIAGNOSIS — I10 ESSENTIAL HYPERTENSION: ICD-10-CM

## 2025-04-17 DIAGNOSIS — R06.02 SHORTNESS OF BREATH: ICD-10-CM

## 2025-04-17 DIAGNOSIS — I48.0 PAROXYSMAL ATRIAL FIBRILLATION: Primary | ICD-10-CM

## 2025-04-17 DIAGNOSIS — I50.9 HEART FAILURE, UNSPECIFIED: ICD-10-CM

## 2025-04-17 DIAGNOSIS — D50.9 IRON DEFICIENCY ANEMIA, UNSPECIFIED IRON DEFICIENCY ANEMIA TYPE: ICD-10-CM

## 2025-04-17 DIAGNOSIS — L89.626 PRESSURE-INDUCED DEEP TISSUE DAMAGE OF LEFT HEEL: ICD-10-CM

## 2025-04-17 DIAGNOSIS — E11.65 TYPE 2 DIABETES MELLITUS WITH HYPERGLYCEMIA, WITH LONG-TERM CURRENT USE OF INSULIN: ICD-10-CM

## 2025-04-17 DIAGNOSIS — E78.5 HYPERLIPIDEMIA, UNSPECIFIED HYPERLIPIDEMIA TYPE: ICD-10-CM

## 2025-04-17 LAB
ANION GAP SERPL CALCULATED.3IONS-SCNC: 13.1 MMOL/L (ref 5–15)
BASOPHILS # BLD AUTO: 0.04 10*3/MM3 (ref 0–0.2)
BASOPHILS NFR BLD AUTO: 0.3 % (ref 0–1.5)
BUN SERPL-MCNC: 29 MG/DL (ref 6–20)
BUN/CREAT SERPL: 14.9 (ref 7–25)
CALCIUM SPEC-SCNC: 9 MG/DL (ref 8.6–10.5)
CHLORIDE SERPL-SCNC: 97 MMOL/L (ref 98–107)
CO2 SERPL-SCNC: 25.9 MMOL/L (ref 22–29)
CREAT SERPL-MCNC: 1.94 MG/DL (ref 0.76–1.27)
DEPRECATED RDW RBC AUTO: 46.1 FL (ref 37–54)
EGFRCR SERPLBLD CKD-EPI 2021: 39.1 ML/MIN/1.73
EOSINOPHIL # BLD AUTO: 0.4 10*3/MM3 (ref 0–0.4)
EOSINOPHIL NFR BLD AUTO: 2.6 % (ref 0.3–6.2)
ERYTHROCYTE [DISTWIDTH] IN BLOOD BY AUTOMATED COUNT: 14.3 % (ref 12.3–15.4)
GLUCOSE SERPL-MCNC: 252 MG/DL (ref 65–99)
HCT VFR BLD AUTO: 29.9 % (ref 37.5–51)
HGB BLD-MCNC: 9.2 G/DL (ref 13–17.7)
IMM GRANULOCYTES # BLD AUTO: 0.06 10*3/MM3 (ref 0–0.05)
IMM GRANULOCYTES NFR BLD AUTO: 0.4 % (ref 0–0.5)
LYMPHOCYTES # BLD AUTO: 2.33 10*3/MM3 (ref 0.7–3.1)
LYMPHOCYTES NFR BLD AUTO: 14.9 % (ref 19.6–45.3)
MCH RBC QN AUTO: 27.2 PG (ref 26.6–33)
MCHC RBC AUTO-ENTMCNC: 30.8 G/DL (ref 31.5–35.7)
MCV RBC AUTO: 88.5 FL (ref 79–97)
MONOCYTES # BLD AUTO: 1.27 10*3/MM3 (ref 0.1–0.9)
MONOCYTES NFR BLD AUTO: 8.1 % (ref 5–12)
NEUTROPHILS NFR BLD AUTO: 11.5 10*3/MM3 (ref 1.7–7)
NEUTROPHILS NFR BLD AUTO: 73.7 % (ref 42.7–76)
PLATELET # BLD AUTO: 586 10*3/MM3 (ref 140–450)
PMV BLD AUTO: 8.8 FL (ref 6–12)
POTASSIUM SERPL-SCNC: 4.1 MMOL/L (ref 3.5–5.2)
RBC # BLD AUTO: 3.38 10*6/MM3 (ref 4.14–5.8)
SODIUM SERPL-SCNC: 136 MMOL/L (ref 136–145)
WBC NRBC COR # BLD AUTO: 15.6 10*3/MM3 (ref 3.4–10.8)

## 2025-04-17 PROCEDURE — 85025 COMPLETE CBC W/AUTO DIFF WBC: CPT | Performed by: FAMILY MEDICINE

## 2025-04-17 PROCEDURE — 80048 BASIC METABOLIC PNL TOTAL CA: CPT | Performed by: FAMILY MEDICINE

## 2025-04-17 NOTE — ASSESSMENT & PLAN NOTE
Blood pressure slightly elevated today.  Encourage patient to continue to monitor blood pressure at home.  Encouraged low-sodium diet.

## 2025-04-17 NOTE — ASSESSMENT & PLAN NOTE
Patient currently denies heart palpitations.  No bleeding issues.  Continue Eliquis 5 mg twice daily and Cardizem  daily.

## 2025-04-17 NOTE — TELEPHONE ENCOUNTER
Order has been received. Waiting for Dr. Canada to be back in office to sign order. Will fax back when signed. Patient's wife was made aware.

## 2025-04-17 NOTE — PROGRESS NOTES
Chief Complaint  Follow-up    Subjective        History of Present Illness   Preston Wallis presents to [facility] CARDIOLOGY     Mr. Wallis is a 59-year-old male with significant history of severe COPD diabetes pulmonary embolism CKD here for his annual follow-up on congestive heart failure hyperlipidemia and hypertension.  He is in the office today in a wheelchair and accompanied by his wife.  Patient reports over all cardiac wise he is doing well with no complaints.  He was just recently at [facility] admitted 1/23/2025 for shortness of breath and was diagnosed with pneumonia.   He does have chronic shortness of breath and wears oxygen via nasal cannula 2 Liters continuously. He does have a chronic perianal wound that is being cared for currently as well. Patient has no complaints of chest pain, palpitations, lightheadedness or syncope.  He has not had an increase in weight gain and denies leg edema at this time.      Past Medical History:   Diagnosis Date    1. Incision and drainage of posterior perianal abscess 2. Sharp excisional debridement through skin and subcutaneous tissue in the posterior perianal area, 9 x 6 x 1 cm 01/26/2025    Age-related cognitive decline     Allergic contact dermatitis     Allergies     Anemia     Bedbound     Bronchiectasis with acute lower respiratory infection     Charcot foot due to diabetes mellitus 09/10/2013    Chronic diastolic (congestive) heart failure     Chronic kidney disease     Chronic respiratory failure with hypoxia     Closed supracondylar fracture of femur 01/12/2022    COPD (chronic obstructive pulmonary disease)     Deep vein thrombosis (DVT) of lower extremity associated with air travel 01/13/2023    Dependence on supplemental oxygen     Eczema     Erectile dysfunction     Essential (primary) hypertension     Fracture     Fracture of proximal humerus 01/13/2023    GERD without esophagitis     High risk medication use     Hypercholesteremia      Hypomagnesemia     Infected stasis ulcer of left lower extremity 01/13/2023    Insomnia     Low back pain     Major depressive disorder     Morbid (severe) obesity due to excess calories     MRSA pneumonia     Muscle weakness     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 without evidence of necrosis     Obstructive sleep apnea (adult) (pediatric)     On home O2     Other forms of dyspnea     Other long term (current) drug therapy     Other specified noninfective gastroenteritis and colitis     Other spondylosis, lumbar region     Pain in both knees     Paroxysmal atrial fibrillation     Peripheral neuropathy     Pneumonia, unspecified organism     Polyneuropathy     Rash and other nonspecific skin eruption     Self-catheterizes urinary bladder     Smoking     Syncope and collapse     Tachycardia     Tinnitus 01/13/2023    Type 1 diabetes mellitus with diabetic chronic kidney disease     Type 2 diabetes mellitus     Unspecified fall, initial encounter     Urinary retention        Allergies   Allergen Reactions    Benadryl [Diphenhydramine] Itching    Proventil [Albuterol] Other (See Comments)     Mouth sores          Past Surgical History:   Procedure Laterality Date    BRONCHOSCOPY N/A 1/28/2025    Procedure: BRONCHOSCOPY: BAL: insertion of lighted instrument to view inside the lung;  Surgeon: Walter Nicole DO;  Location: [facility] MAIN OR;  Service: Pulmonary;  Laterality: N/A;    BRONCHOSCOPY N/A 2/3/2025    Procedure: BRONCHOSCOPY WITH BRONCHOALVEOLAR LAVAGE, POSSIBLE BIOPSY, BRUSHING, WASHING, AIRWAY INSPECTION: insertion of lighted instrument to view inside the lung;  Surgeon: Chadd Swan MD;  Location: [facility] OR;  Service: Pulmonary;  Laterality: N/A;    CARDIAC CATHETERIZATION Left 8/15/2024    Procedure: Carbon dioxide aortogram with left leg angiogram, possible angioplasty or stenting;  Surgeon: Moshe Willson MD;  Location: [facility] CATH  INVASIVE LOCATION;  Service: Vascular;  Laterality: Left;    CHOLECYSTECTOMY      COLOSTOMY N/A 2/6/2025    Procedure: COLOSTOMY LAPAROSCOPIC; plain text: creation of colostomy using small incisions;  Surgeon: Emerson Canada MD;  Location: [facility] [place] [facility];  Service: General;  Laterality: N/A;    CYSTOSCOPY      ENDOSCOPY N/A 3/18/2025    Procedure: ESOPHAGOGASTRODUODENOSCOPY WITH BIOPSIES;  Surgeon: Rafael Hernandez MD;  Location: [facility] ENDOSCOPY;  Service: Gastroenterology;  Laterality: N/A;  HIATAL HERNIA, REFLUX ESOPHAGITIS    FEMUR SURGERY Left     Shravan placed    INCISION AND DRAINAGE ABSCESS N/A 1/26/2025    Procedure: INCISION AND DRAINAGE ABSCESS; plain text: incision and drain pus from buttocks wound;  Surgeon: Emerson Canada MD;  Location: [facility] OR [facility];  Service: General;  Laterality: N/A;    KNEE SURGERY Left     OTHER SURGICAL HISTORY Left     venous port, REMOVED    PORTACATH PLACEMENT Right     TIBIAL PLATEAU OPEN REDUCTION INTERNAL FIXATION Left 12/22/2023    Procedure: TIBIAL PLATEAU OPEN REDUCTION INTERNAL FIXATION;  Surgeon: Hugo Kline MD;  Location: [facility];  Service: Orthopedics;  Laterality: Left;    TONSILLECTOMY AND ADENOIDECTOMY          Social History  He  reports that he quit smoking about 32 years ago. His smoking use included cigarettes. He started smoking about 44 years ago. He has a 12 pack-year smoking history. He has been exposed to tobacco smoke. He has never used smokeless tobacco. He reports that he does not currently use alcohol. He reports that he does not use drugs.    Family History  His family history includes Asthma in his father; Cancer in his sister; Coronary artery disease in his mother; Diabetes type II in his mother and sister; Hypertension in his mother.       Current Outpatient Medications on File Prior to Visit   Medication Sig    arformoterol (BROVANA) 15 MCG/2ML nebulizer solution Take 2 mL by nebulization 2 (Two) Times a Day.    Aspirin  Low Dose 81 MG EC tablet TAKE 1 TABLET BY MOUTH EVERY MORNING    atorvastatin (LIPITOR) 20 MG tablet TAKE 1 TABLET BY MOUTH EVERY NIGHT AT BEDTIME    budesonide (Pulmicort) 0.5 MG/2ML nebulizer solution Take 2 mL by nebulization 2 (Two) Times a Day.    busPIRone (BUSPAR) 15 MG tablet TAKE 1 TABLET BY MOUTH TWICE DAILY    calcium citrate (CALCITRATE) 950 (200 Ca) MG tablet TAKE 1 TABLET BY MOUTH EVERY NIGHT AT BEDTIME    Cholecalciferol (Vitamin D3) 50 MCG (2000 UT) tablet TAKE 1 TABLET BY MOUTH EVERY MORNING    collagenase (Santyl) 250 UNIT/GM ointment Apply 1 Application topically to the appropriate area as directed Daily.    dilTIAZem CD (CARDIZEM CD) 240 MG 24 hr capsule Take 1 capsule by mouth Daily for 30 days.    docusate sodium (COLACE) 100 MG capsule TAKE 1 CAPSULE BY MOUTH TWICE DAILY    DULoxetine (Cymbalta) 30 MG capsule Take 1 capsule by mouth Daily.    Eliquis 5 MG tablet tablet TAKE 1 TABLET BY MOUTH EVERY TWELVE HOURS    Farxiga 5 MG tablet tablet TAKE 1 TABLET BY MOUTH EVERY MORNING    ferrous gluconate (FERGON) 324 MG tablet TAKE 1 TABLET BY MOUTH EVERY MORNING    finasteride (PROSCAR) 5 MG tablet TAKE 1 TABLET BY MOUTH EVERY NIGHT AT BEDTIME    folic acid (FOLVITE) 1 MG tablet TAKE 1 TABLET BY MOUTH EVERY NIGHT AT BEDTIME    Insulin Glargine (BASAGLAR KWIKPEN) 100 UNIT/ML injection pen Inject 15 Units under the skin into the appropriate area as directed Daily for 180 days.    Insulin Lispro, 1 Unit Dial, (HUMALOG) 100 UNIT/ML solution pen-injector Inject 5 Units under the skin into the appropriate area as directed 3 (Three) Times a Day Before Meals.    ipratropium-albuterol (DUO-NEB) 0.5-2.5 mg/3 ml nebulizer Take 3 mL by nebulization Every 4 (Four) Hours As Needed for Wheezing or Shortness of Air.    Magnesium Oxide -Mg Supplement 400 (240 Mg) MG tablet Take 1 tablet by mouth every night at bedtime.    montelukast (SINGULAIR) 10 MG tablet Take 1 tablet by mouth Every Night.    Multiple  Vitamins-Iron (GNP One Daily Plus Iron) tablet TAKE 1 TABLET BY MOUTH EVERY MORNING    Nutritional Supplements (Rosalino Nutrivigor) pack Take 1 packet by mouth 2 (Two) Times a Day.    pantoprazole (PROTONIX) 40 MG EC tablet Take 1 tablet by mouth Every Morning.    Povidone-Iodine (Betadine) 5 % external solution 5% Apply 1 mL topically to the appropriate area as directed 2 (Two) Times a Day. Left Heel    Semaglutide, 1 MG/DOSE, (Ozempic, 1 MG/DOSE,) 4 MG/3ML solution pen-injector Inject 1 mg under the skin into the appropriate area as directed 1 (One) Time Per Week.    Sodium Hypochlorite (Anasept) 0.057 % liquid Apply 1 Application topically Daily. Sacrum    Sodium Hypochlorite (Anasept) 0.057 % liquid Apply 1 Application topically As Needed (This is in addition to the QD scheduled application). Sacrum    sodium hypochlorite (DAKIN'S 1/4 STRENGTH) 0.125 % solution topical solution 0.125% Apply 10 mL topically to the appropriate area as directed 2 (Two) Times a Day.    tamsulosin (FLOMAX) 0.4 MG capsule 24 hr capsule TAKE 1 CAPSULE BY MOUTH EVERY NIGHT AT BEDTIME    tiotropium (SPIRIVA) 18 MCG per inhalation capsule Place 1 capsule into inhaler and inhale Daily.    tobramycin PF (MOIZ) 300 MG/5ML nebulizer solution Take 5 mL by nebulization 2 (Two) Times a Day for 28 days. nebulize 1 vial BY MOUTH TWICE DAILY FOR 28 DAYS ON AND 28 DAYS OFF    vitamin C (ASCORBIC ACID) 500 MG tablet TAKE 1 TABLET BY MOUTH TWICE DAILY     No current facility-administered medications on file prior to visit.         Review of Systems   Respiratory:  Negative for chest tightness and shortness of breath.    Cardiovascular:  Negative for chest pain, palpitations and leg swelling.   Gastrointestinal:  Negative for nausea, vomiting and indigestion.   Neurological:  Negative for dizziness, syncope and light-headedness.        Objective   Vitals:    04/17/25 1041   BP: 148/75   Pulse: 95   Weight: 103 kg (227 lb)         Physical Exam   General  ": Alert, awake, no acute distress  Neck : Supple, no carotid bruit, no jugular venous distention  CVS : Regular rate and rhythm, no murmur, no rubs or gallops  Lungs: Clear to auscultation bilaterally, no crackles or rhonchi  Abdomen: Soft, nontender, bowel sounds active  Extremities: Warm, well-perfused, no pedal edema      Result Review     The following data was reviewed by CON Menon  proBNP   Date Value Ref Range Status   03/16/2025 1,518.0 (H) 0.0 - 900.0 pg/mL Final     CMP          3/17/2025    02:13 3/18/2025    06:15 3/19/2025    05:34   CMP   Glucose 105  136  130    BUN 20  19  26    Creatinine 1.90  2.01  1.87    EGFR 40.1  37.5  40.9    Sodium 141  138  137    Potassium 3.6  3.5  3.7    Chloride 102  101  101    Calcium 8.4  8.7  9.4    Total Protein 6.7      Albumin 2.6      Globulin 4.1      Total Bilirubin 0.2      Alkaline Phosphatase 167      AST (SGOT) 35      ALT (SGPT) 29      Albumin/Globulin Ratio 0.6      BUN/Creatinine Ratio 10.5  9.5  13.9    Anion Gap 11.3  8.0  7.7      CBC w/diff          3/17/2025    00:03 3/17/2025    07:48 3/17/2025    17:09 3/18/2025    06:15 3/19/2025    05:34   CBC w/Diff   WBC    10.77  9.05    RBC    3.08  3.45    Hemoglobin 8.3  8.2  9.4  8.7  9.6    Hematocrit 27.2  27.0  30.4  27.6  30.8    MCV    89.6  89.3    MCH    28.2  27.8    MCHC    31.5  31.2    RDW    14.9  14.6    Platelets    437  479       Lab Results   Component Value Date    TSH 1.497 04/13/2024      No results found for: \"FREET4\"   No results found for: \"DDIMERQUANT\"  Magnesium   Date Value Ref Range Status   02/20/2025 1.9 1.6 - 2.6 mg/dL Final      No results found for: \"DIGOXIN\"   Lab Results   Component Value Date    TROPONINT 125 (C) 03/16/2025           Lipid Panel          7/18/2024    13:55 1/25/2025    03:49   Lipid Panel   Total Cholesterol 109  116    Triglycerides 117  49    HDL Cholesterol 36  54    VLDL Cholesterol 21  12    LDL Cholesterol  52  50    LDL/HDL Ratio 1.38  " 0.97          Results for orders placed during the hospital encounter of 01/23/25    Adult Transthoracic Echo Complete W/ Cont if Necessary Per Protocol    Interpretation Summary    Left ventricular systolic function is low normal. Calculated left ventricular EF = 49.4%    Left ventricular wall thickness is consistent with moderate concentric hypertrophy.    Left ventricular diastolic function is consistent with (grade I) impaired relaxation.    There is a small (<1cm) pericardial effusion adjacent to the right ventricle.    Results for orders placed during the hospital encounter of 01/23/25    Stress Test With Myocardial Perfusion One Day    Interpretation Summary    Left ventricular ejection fraction is mildly reduced (Calculated EF = 40%).    Low probability of ischemia during this study, patient may had an apical infarct versus apical thinning..    Findings consistent with an equivocal ECG stress test.        Procedures        Assessment and Plan   Diagnoses and all orders for this visit:    1. Paroxysmal atrial fibrillation (Primary)  Assessment & Plan:  Patient currently denies heart palpitations.  No bleeding issues.  Continue Eliquis 5 mg twice daily and Cardizem  daily.      2. Essential hypertension  Assessment & Plan:  Blood pressure slightly elevated today.  Encourage patient to continue to monitor blood pressure at home.  Encouraged low-sodium diet.      3. Chronic diastolic (congestive) heart failure  Assessment & Plan:  Patient has chronic COPD and shortness of breath.  Denies any increase in shortness of breath.  No lower extremity edema.  Clinically not in fluid overload. Echocardiogram 1/23/2025 LV ejection fraction 49.4%.Left ventricular wall thickness is consistent with moderate concentric hypertrophy.  Encourage patient to closely monitor blood pressure goal is below 130/90.          4. Hyperlipidemia, unspecified hyperlipidemia type  Assessment & Plan:   Lipid panel 1/25/2025 LDL 50, HDL  54, triglycerides.  Continue atorvastatin 20 mg at night.  Encouraged to watch diet.                  Follow Up   Return in about 1 year (around 4/17/2026) for with Dr Ware.    Preston Wallis  reports that he quit smoking about 32 years ago. His smoking use included cigarettes. He started smoking about 44 years ago. He has a 12 pack-year smoking history. He has been exposed to tobacco smoke. He has never used smokeless tobacco.          Patient was given instructions and counseling regarding his condition or for health maintenance advice. Please see specific information pulled into the AVS if appropriate.     Signed,  Alix Jackson, APRN  04/17/2025     Dictated Utilizing Dragon Dictation: Please note that portions of this note were completed with a voice recognition program.  Part of this note may be an electronic transcription/translation of spoken language to printed text using the Dragon Dictation System.

## 2025-04-17 NOTE — ASSESSMENT & PLAN NOTE
Patient has chronic COPD and shortness of breath.  Denies any increase in shortness of breath.  No lower extremity edema.  Clinically not in fluid overload. Echocardiogram 1/23/2025 LV ejection fraction 49.4%.Left ventricular wall thickness is consistent with moderate concentric hypertrophy.  Encourage patient to closely monitor blood pressure goal is below 130/90.

## 2025-04-17 NOTE — ASSESSMENT & PLAN NOTE
Lipid panel 1/25/2025 LDL 50, HDL 54, triglycerides.  Continue atorvastatin 20 mg at night.  Encouraged to watch diet.

## 2025-04-18 ENCOUNTER — DOCUMENTATION (OUTPATIENT)
Dept: FAMILY MEDICINE CLINIC | Age: 60
End: 2025-04-18
Payer: MEDICAID

## 2025-04-18 RX ORDER — FERROUS GLUCONATE 324(38)MG
324 TABLET ORAL EVERY MORNING
Qty: 30 TABLET | Refills: 0 | Status: SHIPPED | OUTPATIENT
Start: 2025-04-18

## 2025-04-18 RX ORDER — NITROFURANTOIN 25; 75 MG/1; MG/1
100 CAPSULE ORAL 2 TIMES DAILY
Qty: 10 CAPSULE | Refills: 0 | Status: SHIPPED | OUTPATIENT
Start: 2025-04-18 | End: 2025-04-22

## 2025-04-18 RX ORDER — DAPAGLIFLOZIN 5 MG/1
5 TABLET, FILM COATED ORAL EVERY MORNING
Qty: 30 TABLET | Refills: 0 | Status: SHIPPED | OUTPATIENT
Start: 2025-04-18

## 2025-04-18 RX ORDER — NITROFURANTOIN 25; 75 MG/1; MG/1
100 CAPSULE ORAL 2 TIMES DAILY
Qty: 10 CAPSULE | Refills: 0 | Status: SHIPPED | OUTPATIENT
Start: 2025-04-18 | End: 2025-04-24 | Stop reason: HOSPADM

## 2025-04-18 NOTE — TELEPHONE ENCOUNTER
I reviewed lab shahzad labs with DR Bourne and pt has a UTI I discussed culture with DR Bourne and she is going to give pt macrobid to crumes benny informed and she will call [facility] to get the chest x-ray ordered

## 2025-04-18 NOTE — TELEPHONE ENCOUNTER
JOSE FROM [facility] CALLED TO F/U ON ORDER FOR OSTOMY SUPPLIES THAT HE FAXED.  I TOLD HIM IT WAS RECEIVED, AND PER TAD, ORDER WAAS SIGNED BY DR. PERALES AND FAXED ON 04/18/25.

## 2025-04-21 ENCOUNTER — TELEPHONE (OUTPATIENT)
Dept: FAMILY MEDICINE CLINIC | Age: 60
End: 2025-04-21
Payer: MEDICAID

## 2025-04-21 DIAGNOSIS — R11.2 NAUSEA AND VOMITING, UNSPECIFIED VOMITING TYPE: Primary | ICD-10-CM

## 2025-04-21 RX ORDER — ONDANSETRON 4 MG/1
4 TABLET, ORALLY DISINTEGRATING ORAL EVERY 8 HOURS PRN
Qty: 20 TABLET | Refills: 0 | Status: SHIPPED | OUTPATIENT
Start: 2025-04-21

## 2025-04-22 ENCOUNTER — HOSPITAL ENCOUNTER (INPATIENT)
Facility: HOSPITAL | Age: 60
LOS: 2 days | Discharge: HOME | DRG: 291 | End: 2025-04-24
Attending: EMERGENCY MEDICINE | Admitting: INTERNAL MEDICINE
Payer: MEDICAID

## 2025-04-22 ENCOUNTER — APPOINTMENT (OUTPATIENT)
Dept: CT IMAGING | Facility: HOSPITAL | Age: 60
DRG: 291 | End: 2025-04-22
Payer: MEDICAID

## 2025-04-22 ENCOUNTER — APPOINTMENT (OUTPATIENT)
Dept: GENERAL RADIOLOGY | Facility: HOSPITAL | Age: 60
DRG: 291 | End: 2025-04-22
Payer: MEDICAID

## 2025-04-22 ENCOUNTER — PATIENT OUTREACH (OUTPATIENT)
Dept: CASE MANAGEMENT | Facility: OTHER | Age: 60
End: 2025-04-22
Payer: MEDICAID

## 2025-04-22 DIAGNOSIS — D50.9 IRON DEFICIENCY ANEMIA, UNSPECIFIED IRON DEFICIENCY ANEMIA TYPE: ICD-10-CM

## 2025-04-22 DIAGNOSIS — R26.2 DIFFICULTY WALKING: ICD-10-CM

## 2025-04-22 DIAGNOSIS — R65.10 SIRS (SYSTEMIC INFLAMMATORY RESPONSE SYNDROME): ICD-10-CM

## 2025-04-22 DIAGNOSIS — J96.11 CHRONIC RESPIRATORY FAILURE WITH HYPOXIA AND HYPERCAPNIA: Primary | ICD-10-CM

## 2025-04-22 DIAGNOSIS — D72.829 LEUKOCYTOSIS, UNSPECIFIED TYPE: ICD-10-CM

## 2025-04-22 DIAGNOSIS — E55.9 VITAMIN D DEFICIENCY: ICD-10-CM

## 2025-04-22 DIAGNOSIS — E11.65 TYPE 2 DIABETES MELLITUS WITH HYPERGLYCEMIA, WITH LONG-TERM CURRENT USE OF INSULIN: ICD-10-CM

## 2025-04-22 DIAGNOSIS — I48.0 PAROXYSMAL ATRIAL FIBRILLATION: ICD-10-CM

## 2025-04-22 DIAGNOSIS — K21.9 GASTROESOPHAGEAL REFLUX DISEASE WITHOUT ESOPHAGITIS: ICD-10-CM

## 2025-04-22 DIAGNOSIS — R11.2 NAUSEA AND VOMITING, UNSPECIFIED VOMITING TYPE: Primary | ICD-10-CM

## 2025-04-22 DIAGNOSIS — E78.2 MIXED HYPERLIPIDEMIA: ICD-10-CM

## 2025-04-22 DIAGNOSIS — K59.09 OTHER CONSTIPATION: ICD-10-CM

## 2025-04-22 DIAGNOSIS — N40.1 BENIGN PROSTATIC HYPERPLASIA WITH URINARY RETENTION: ICD-10-CM

## 2025-04-22 DIAGNOSIS — J44.1 COPD EXACERBATION: ICD-10-CM

## 2025-04-22 DIAGNOSIS — L97.521 ULCER OF LEFT FOOT, LIMITED TO BREAKDOWN OF SKIN: ICD-10-CM

## 2025-04-22 DIAGNOSIS — R33.8 BENIGN PROSTATIC HYPERPLASIA WITH URINARY RETENTION: ICD-10-CM

## 2025-04-22 DIAGNOSIS — F41.9 ANXIETY: ICD-10-CM

## 2025-04-22 DIAGNOSIS — Z79.4 TYPE 2 DIABETES MELLITUS WITH HYPERGLYCEMIA, WITH LONG-TERM CURRENT USE OF INSULIN: ICD-10-CM

## 2025-04-22 DIAGNOSIS — J96.12 CHRONIC RESPIRATORY FAILURE WITH HYPOXIA AND HYPERCAPNIA: Primary | ICD-10-CM

## 2025-04-22 DIAGNOSIS — N18.32 STAGE 3B CHRONIC KIDNEY DISEASE (CKD): ICD-10-CM

## 2025-04-22 PROBLEM — I50.33 ACUTE ON CHRONIC DIASTOLIC HEART FAILURE: Status: ACTIVE | Noted: 2022-02-01

## 2025-04-22 LAB
ALBUMIN SERPL-MCNC: 3.4 G/DL (ref 3.5–5.2)
ALBUMIN/GLOB SERPL: 0.7 G/DL
ALP SERPL-CCNC: 282 U/L (ref 39–117)
ALT SERPL W P-5'-P-CCNC: 53 U/L (ref 1–41)
ANION GAP SERPL CALCULATED.3IONS-SCNC: 8.8 MMOL/L (ref 5–15)
AST SERPL-CCNC: 72 U/L (ref 1–40)
BACTERIA UR QL AUTO: NORMAL /HPF
BASOPHILS # BLD AUTO: 0.11 10*3/MM3 (ref 0–0.2)
BASOPHILS NFR BLD AUTO: 0.7 % (ref 0–1.5)
BILIRUB SERPL-MCNC: 0.2 MG/DL (ref 0–1.2)
BILIRUB UR QL STRIP: NEGATIVE
BUN SERPL-MCNC: 26 MG/DL (ref 6–20)
BUN/CREAT SERPL: 14.5 (ref 7–25)
CALCIUM SPEC-SCNC: 9.9 MG/DL (ref 8.6–10.5)
CHLORIDE SERPL-SCNC: 97 MMOL/L (ref 98–107)
CLARITY UR: CLEAR
CO2 SERPL-SCNC: 31.2 MMOL/L (ref 22–29)
COLOR UR: YELLOW
CREAT SERPL-MCNC: 1.79 MG/DL (ref 0.76–1.27)
D-LACTATE SERPL-SCNC: 0.9 MMOL/L (ref 0.5–2)
DEPRECATED RDW RBC AUTO: 46.5 FL (ref 37–54)
EGFRCR SERPLBLD CKD-EPI 2021: 43.1 ML/MIN/1.73
EOSINOPHIL # BLD AUTO: 0.37 10*3/MM3 (ref 0–0.4)
EOSINOPHIL NFR BLD AUTO: 2.4 % (ref 0.3–6.2)
ERYTHROCYTE [DISTWIDTH] IN BLOOD BY AUTOMATED COUNT: 14.2 % (ref 12.3–15.4)
GLOBULIN UR ELPH-MCNC: 5.1 GM/DL
GLUCOSE BLDC GLUCOMTR-MCNC: 85 MG/DL (ref 70–99)
GLUCOSE SERPL-MCNC: 139 MG/DL (ref 65–99)
GLUCOSE UR STRIP-MCNC: ABNORMAL MG/DL
HCT VFR BLD AUTO: 31.4 % (ref 37.5–51)
HGB BLD-MCNC: 9.6 G/DL (ref 13–17.7)
HGB UR QL STRIP.AUTO: NEGATIVE
HOLD SPECIMEN: NORMAL
HOLD SPECIMEN: NORMAL
HYALINE CASTS UR QL AUTO: NORMAL /LPF
IMM GRANULOCYTES # BLD AUTO: 0.09 10*3/MM3 (ref 0–0.05)
IMM GRANULOCYTES NFR BLD AUTO: 0.6 % (ref 0–0.5)
KETONES UR QL STRIP: NEGATIVE
LEUKOCYTE ESTERASE UR QL STRIP.AUTO: NEGATIVE
LIPASE SERPL-CCNC: 44 U/L (ref 13–60)
LIPASE SERPL-CCNC: 46 U/L (ref 13–60)
LYMPHOCYTES # BLD AUTO: 1.92 10*3/MM3 (ref 0.7–3.1)
LYMPHOCYTES NFR BLD AUTO: 12.6 % (ref 19.6–45.3)
MAGNESIUM SERPL-MCNC: 2 MG/DL (ref 1.6–2.6)
MCH RBC QN AUTO: 27.6 PG (ref 26.6–33)
MCHC RBC AUTO-ENTMCNC: 30.6 G/DL (ref 31.5–35.7)
MCV RBC AUTO: 90.2 FL (ref 79–97)
MONOCYTES # BLD AUTO: 0.94 10*3/MM3 (ref 0.1–0.9)
MONOCYTES NFR BLD AUTO: 6.2 % (ref 5–12)
NEUTROPHILS NFR BLD AUTO: 11.76 10*3/MM3 (ref 1.7–7)
NEUTROPHILS NFR BLD AUTO: 77.5 % (ref 42.7–76)
NITRITE UR QL STRIP: NEGATIVE
NRBC BLD AUTO-RTO: 0 /100 WBC (ref 0–0.2)
NT-PROBNP SERPL-MCNC: 2629 PG/ML (ref 0–900)
PH UR STRIP.AUTO: 7 [PH] (ref 5–8)
PLATELET # BLD AUTO: 539 10*3/MM3 (ref 140–450)
PMV BLD AUTO: 8.7 FL (ref 6–12)
POTASSIUM SERPL-SCNC: 4.3 MMOL/L (ref 3.5–5.2)
PROCALCITONIN SERPL-MCNC: 0.24 NG/ML (ref 0–0.25)
PROT SERPL-MCNC: 8.5 G/DL (ref 6–8.5)
PROT UR QL STRIP: ABNORMAL
RBC # BLD AUTO: 3.48 10*6/MM3 (ref 4.14–5.8)
RBC # UR STRIP: NORMAL /HPF
REF LAB TEST METHOD: NORMAL
SODIUM SERPL-SCNC: 137 MMOL/L (ref 136–145)
SP GR UR STRIP: 1.01 (ref 1–1.03)
SQUAMOUS #/AREA URNS HPF: NORMAL /HPF
UROBILINOGEN UR QL STRIP: ABNORMAL
WBC # UR STRIP: NORMAL /HPF
WBC NRBC COR # BLD AUTO: 15.19 10*3/MM3 (ref 3.4–10.8)
WHOLE BLOOD HOLD COAG: NORMAL
WHOLE BLOOD HOLD SPECIMEN: NORMAL

## 2025-04-22 PROCEDURE — 25010000002 FUROSEMIDE PER 20 MG: Performed by: INTERNAL MEDICINE

## 2025-04-22 PROCEDURE — 25010000002 ONDANSETRON PER 1 MG: Performed by: INTERNAL MEDICINE

## 2025-04-22 PROCEDURE — 85025 COMPLETE CBC W/AUTO DIFF WBC: CPT | Performed by: EMERGENCY MEDICINE

## 2025-04-22 PROCEDURE — 83735 ASSAY OF MAGNESIUM: CPT | Performed by: INTERNAL MEDICINE

## 2025-04-22 PROCEDURE — 25010000002 ONDANSETRON PER 1 MG: Performed by: EMERGENCY MEDICINE

## 2025-04-22 PROCEDURE — 84145 PROCALCITONIN (PCT): CPT | Performed by: EMERGENCY MEDICINE

## 2025-04-22 PROCEDURE — 83605 ASSAY OF LACTIC ACID: CPT | Performed by: EMERGENCY MEDICINE

## 2025-04-22 PROCEDURE — 25010000002 VANCOMYCIN 5 G RECONSTITUTED SOLUTION: Performed by: EMERGENCY MEDICINE

## 2025-04-22 PROCEDURE — 99285 EMERGENCY DEPT VISIT HI MDM: CPT

## 2025-04-22 PROCEDURE — 71250 CT THORAX DX C-: CPT

## 2025-04-22 PROCEDURE — 82948 REAGENT STRIP/BLOOD GLUCOSE: CPT | Performed by: INTERNAL MEDICINE

## 2025-04-22 PROCEDURE — 83690 ASSAY OF LIPASE: CPT | Performed by: INTERNAL MEDICINE

## 2025-04-22 PROCEDURE — 25010000002 PIPERACILLIN SOD-TAZOBACTAM PER 1 G: Performed by: INTERNAL MEDICINE

## 2025-04-22 PROCEDURE — 87040 BLOOD CULTURE FOR BACTERIA: CPT | Performed by: EMERGENCY MEDICINE

## 2025-04-22 PROCEDURE — 36415 COLL VENOUS BLD VENIPUNCTURE: CPT

## 2025-04-22 PROCEDURE — 71045 X-RAY EXAM CHEST 1 VIEW: CPT

## 2025-04-22 PROCEDURE — 51702 INSERT TEMP BLADDER CATH: CPT

## 2025-04-22 PROCEDURE — 83690 ASSAY OF LIPASE: CPT | Performed by: EMERGENCY MEDICINE

## 2025-04-22 PROCEDURE — 25010000002 PIPERACILLIN SOD-TAZOBACTAM PER 1 G: Performed by: EMERGENCY MEDICINE

## 2025-04-22 PROCEDURE — 93010 ELECTROCARDIOGRAM REPORT: CPT | Performed by: INTERNAL MEDICINE

## 2025-04-22 PROCEDURE — 81001 URINALYSIS AUTO W/SCOPE: CPT | Performed by: EMERGENCY MEDICINE

## 2025-04-22 PROCEDURE — 93005 ELECTROCARDIOGRAM TRACING: CPT | Performed by: INTERNAL MEDICINE

## 2025-04-22 PROCEDURE — 83880 ASSAY OF NATRIURETIC PEPTIDE: CPT | Performed by: INTERNAL MEDICINE

## 2025-04-22 PROCEDURE — 25810000003 SODIUM CHLORIDE 0.9 % SOLUTION: Performed by: EMERGENCY MEDICINE

## 2025-04-22 PROCEDURE — 74176 CT ABD & PELVIS W/O CONTRAST: CPT

## 2025-04-22 PROCEDURE — 99223 1ST HOSP IP/OBS HIGH 75: CPT | Performed by: INTERNAL MEDICINE

## 2025-04-22 PROCEDURE — 80053 COMPREHEN METABOLIC PANEL: CPT | Performed by: EMERGENCY MEDICINE

## 2025-04-22 RX ORDER — TAMSULOSIN HYDROCHLORIDE 0.4 MG/1
0.4 CAPSULE ORAL DAILY
Status: DISCONTINUED | OUTPATIENT
Start: 2025-04-22 | End: 2025-04-25 | Stop reason: HOSPADM

## 2025-04-22 RX ORDER — SODIUM CHLORIDE 0.9 % (FLUSH) 0.9 %
10 SYRINGE (ML) INJECTION AS NEEDED
Status: DISCONTINUED | OUTPATIENT
Start: 2025-04-22 | End: 2025-04-25 | Stop reason: HOSPADM

## 2025-04-22 RX ORDER — CHOLECALCIFEROL (VITAMIN D3) 50 MCG
1 TABLET ORAL EVERY MORNING
Qty: 30 TABLET | Refills: 5 | Status: SHIPPED | OUTPATIENT
Start: 2025-04-22

## 2025-04-22 RX ORDER — PANTOPRAZOLE SODIUM 40 MG/1
40 TABLET, DELAYED RELEASE ORAL
Qty: 30 TABLET | Refills: 4 | Status: SHIPPED | OUTPATIENT
Start: 2025-04-22

## 2025-04-22 RX ORDER — ZINC SULFATE 50(220)MG
220 CAPSULE ORAL EVERY MORNING
Qty: 30 CAPSULE | Refills: 5 | Status: SHIPPED | OUTPATIENT
Start: 2025-04-22

## 2025-04-22 RX ORDER — DILTIAZEM HYDROCHLORIDE 240 MG/1
240 CAPSULE, COATED, EXTENDED RELEASE ORAL EVERY MORNING
Qty: 30 CAPSULE | Refills: 5 | Status: SHIPPED | OUTPATIENT
Start: 2025-04-22

## 2025-04-22 RX ORDER — DILTIAZEM HYDROCHLORIDE 240 MG/1
240 CAPSULE, COATED, EXTENDED RELEASE ORAL EVERY MORNING
Status: DISCONTINUED | OUTPATIENT
Start: 2025-04-23 | End: 2025-04-25 | Stop reason: HOSPADM

## 2025-04-22 RX ORDER — ATORVASTATIN CALCIUM 20 MG/1
20 TABLET, FILM COATED ORAL NIGHTLY
Status: DISCONTINUED | OUTPATIENT
Start: 2025-04-22 | End: 2025-04-25 | Stop reason: HOSPADM

## 2025-04-22 RX ORDER — MONTELUKAST SODIUM 10 MG/1
10 TABLET ORAL NIGHTLY
Status: DISCONTINUED | OUTPATIENT
Start: 2025-04-22 | End: 2025-04-25 | Stop reason: HOSPADM

## 2025-04-22 RX ORDER — ASCORBIC ACID 500 MG
500 TABLET ORAL 2 TIMES DAILY
Qty: 60 TABLET | Refills: 5 | Status: SHIPPED | OUTPATIENT
Start: 2025-04-22

## 2025-04-22 RX ORDER — FUROSEMIDE 10 MG/ML
40 INJECTION INTRAMUSCULAR; INTRAVENOUS
Status: DISCONTINUED | OUTPATIENT
Start: 2025-04-22 | End: 2025-04-25 | Stop reason: HOSPADM

## 2025-04-22 RX ORDER — IPRATROPIUM BROMIDE AND ALBUTEROL SULFATE 2.5; .5 MG/3ML; MG/3ML
3 SOLUTION RESPIRATORY (INHALATION) EVERY 4 HOURS PRN
Status: DISCONTINUED | OUTPATIENT
Start: 2025-04-22 | End: 2025-04-25 | Stop reason: HOSPADM

## 2025-04-22 RX ORDER — DAPAGLIFLOZIN 5 MG/1
5 TABLET, FILM COATED ORAL EVERY MORNING
Qty: 30 TABLET | Refills: 4 | Status: SHIPPED | OUTPATIENT
Start: 2025-04-22

## 2025-04-22 RX ORDER — FINASTERIDE 5 MG/1
5 TABLET, FILM COATED ORAL DAILY
Status: DISCONTINUED | OUTPATIENT
Start: 2025-04-22 | End: 2025-04-25 | Stop reason: HOSPADM

## 2025-04-22 RX ORDER — ONDANSETRON 4 MG/1
4 TABLET, ORALLY DISINTEGRATING ORAL EVERY 6 HOURS PRN
Status: DISCONTINUED | OUTPATIENT
Start: 2025-04-22 | End: 2025-04-25 | Stop reason: HOSPADM

## 2025-04-22 RX ORDER — ZINC SULFATE 50(220)MG
220 CAPSULE ORAL EVERY MORNING
COMMUNITY
End: 2025-04-22 | Stop reason: SDUPTHER

## 2025-04-22 RX ORDER — DULOXETIN HYDROCHLORIDE 30 MG/1
30 CAPSULE, DELAYED RELEASE ORAL DAILY
Status: DISCONTINUED | OUTPATIENT
Start: 2025-04-22 | End: 2025-04-25 | Stop reason: HOSPADM

## 2025-04-22 RX ORDER — ONDANSETRON 2 MG/ML
4 INJECTION INTRAMUSCULAR; INTRAVENOUS EVERY 6 HOURS PRN
Status: DISCONTINUED | OUTPATIENT
Start: 2025-04-22 | End: 2025-04-25 | Stop reason: HOSPADM

## 2025-04-22 RX ORDER — DILTIAZEM HYDROCHLORIDE 240 MG/1
240 CAPSULE, COATED, EXTENDED RELEASE ORAL DAILY
COMMUNITY
Start: 2024-12-02 | End: 2025-04-22

## 2025-04-22 RX ORDER — MONTELUKAST SODIUM 10 MG/1
10 TABLET ORAL NIGHTLY
Qty: 30 TABLET | Refills: 5 | Status: SHIPPED | OUTPATIENT
Start: 2025-04-22

## 2025-04-22 RX ORDER — DEXTROSE MONOHYDRATE 25 G/50ML
25 INJECTION, SOLUTION INTRAVENOUS
Status: DISCONTINUED | OUTPATIENT
Start: 2025-04-22 | End: 2025-04-25 | Stop reason: HOSPADM

## 2025-04-22 RX ORDER — TAMSULOSIN HYDROCHLORIDE 0.4 MG/1
1 CAPSULE ORAL
Qty: 30 CAPSULE | Refills: 5 | Status: SHIPPED | OUTPATIENT
Start: 2025-04-22

## 2025-04-22 RX ORDER — ASPIRIN 81 MG/1
81 TABLET, COATED ORAL EVERY MORNING
Qty: 30 TABLET | Refills: 5 | Status: SHIPPED | OUTPATIENT
Start: 2025-04-22

## 2025-04-22 RX ORDER — ONDANSETRON 2 MG/ML
4 INJECTION INTRAMUSCULAR; INTRAVENOUS ONCE
Status: COMPLETED | OUTPATIENT
Start: 2025-04-22 | End: 2025-04-22

## 2025-04-22 RX ORDER — BUSPIRONE HYDROCHLORIDE 15 MG/1
15 TABLET ORAL 2 TIMES DAILY
Qty: 60 TABLET | Refills: 5 | Status: SHIPPED | OUTPATIENT
Start: 2025-04-22

## 2025-04-22 RX ORDER — ATORVASTATIN CALCIUM 20 MG/1
20 TABLET, FILM COATED ORAL
Qty: 30 TABLET | Refills: 5 | Status: SHIPPED | OUTPATIENT
Start: 2025-04-22

## 2025-04-22 RX ORDER — FAMOTIDINE 20 MG/1
40 TABLET, FILM COATED ORAL EVERY MORNING
Status: DISCONTINUED | OUTPATIENT
Start: 2025-04-23 | End: 2025-04-22 | Stop reason: SDUPTHER

## 2025-04-22 RX ORDER — IBUPROFEN 600 MG/1
1 TABLET ORAL
Status: DISCONTINUED | OUTPATIENT
Start: 2025-04-22 | End: 2025-04-25 | Stop reason: HOSPADM

## 2025-04-22 RX ORDER — LABETALOL HYDROCHLORIDE 5 MG/ML
20 INJECTION, SOLUTION INTRAVENOUS
Status: DISCONTINUED | OUTPATIENT
Start: 2025-04-22 | End: 2025-04-25 | Stop reason: HOSPADM

## 2025-04-22 RX ORDER — ASPIRIN 81 MG/1
81 TABLET ORAL DAILY
Status: DISCONTINUED | OUTPATIENT
Start: 2025-04-22 | End: 2025-04-25 | Stop reason: HOSPADM

## 2025-04-22 RX ORDER — VANCOMYCIN/0.9 % SOD CHLORIDE 1.5G/250ML
1500 PLASTIC BAG, INJECTION (ML) INTRAVENOUS EVERY 24 HOURS
Status: DISCONTINUED | OUTPATIENT
Start: 2025-04-23 | End: 2025-04-25 | Stop reason: HOSPADM

## 2025-04-22 RX ORDER — IBUPROFEN 200 MG
950 CAPSULE ORAL
Qty: 30 TABLET | Refills: 5 | Status: SHIPPED | OUTPATIENT
Start: 2025-04-22

## 2025-04-22 RX ORDER — BUSPIRONE HYDROCHLORIDE 15 MG/1
15 TABLET ORAL 2 TIMES DAILY
Status: DISCONTINUED | OUTPATIENT
Start: 2025-04-22 | End: 2025-04-25 | Stop reason: HOSPADM

## 2025-04-22 RX ORDER — LANOLIN ALCOHOL/MO/W.PET/CERES
1 CREAM (GRAM) TOPICAL
Qty: 30 TABLET | Refills: 5 | Status: SHIPPED | OUTPATIENT
Start: 2025-04-22

## 2025-04-22 RX ORDER — APIXABAN 5 MG/1
5 TABLET, FILM COATED ORAL
Qty: 60 TABLET | Refills: 5 | Status: SHIPPED | OUTPATIENT
Start: 2025-04-22

## 2025-04-22 RX ORDER — DOCUSATE SODIUM 100 MG/1
100 CAPSULE, LIQUID FILLED ORAL 2 TIMES DAILY
Qty: 60 CAPSULE | Refills: 5 | Status: SHIPPED | OUTPATIENT
Start: 2025-04-22

## 2025-04-22 RX ORDER — FAMOTIDINE 40 MG/1
40 TABLET, FILM COATED ORAL EVERY MORNING
COMMUNITY
Start: 2025-04-02

## 2025-04-22 RX ORDER — SODIUM CHLORIDE 9 MG/ML
40 INJECTION, SOLUTION INTRAVENOUS AS NEEDED
Status: DISCONTINUED | OUTPATIENT
Start: 2025-04-22 | End: 2025-04-25 | Stop reason: HOSPADM

## 2025-04-22 RX ORDER — INSULIN LISPRO 100 [IU]/ML
2-7 INJECTION, SOLUTION INTRAVENOUS; SUBCUTANEOUS
Status: DISCONTINUED | OUTPATIENT
Start: 2025-04-22 | End: 2025-04-25 | Stop reason: HOSPADM

## 2025-04-22 RX ORDER — PANTOPRAZOLE SODIUM 40 MG/1
40 TABLET, DELAYED RELEASE ORAL
Status: DISCONTINUED | OUTPATIENT
Start: 2025-04-23 | End: 2025-04-25 | Stop reason: HOSPADM

## 2025-04-22 RX ORDER — FINASTERIDE 5 MG/1
5 TABLET, FILM COATED ORAL
Qty: 30 TABLET | Refills: 5 | Status: SHIPPED | OUTPATIENT
Start: 2025-04-22

## 2025-04-22 RX ORDER — NICOTINE POLACRILEX 4 MG
15 LOZENGE BUCCAL
Status: DISCONTINUED | OUTPATIENT
Start: 2025-04-22 | End: 2025-04-25 | Stop reason: HOSPADM

## 2025-04-22 RX ORDER — FOLIC ACID 1 MG/1
1000 TABLET ORAL
Qty: 30 TABLET | Refills: 5 | Status: SHIPPED | OUTPATIENT
Start: 2025-04-22

## 2025-04-22 RX ORDER — FERROUS GLUCONATE 324(38)MG
324 TABLET ORAL EVERY MORNING
Qty: 30 TABLET | Refills: 4 | Status: SHIPPED | OUTPATIENT
Start: 2025-04-22

## 2025-04-22 RX ORDER — SODIUM CHLORIDE 0.9 % (FLUSH) 0.9 %
10 SYRINGE (ML) INJECTION EVERY 12 HOURS SCHEDULED
Status: DISCONTINUED | OUTPATIENT
Start: 2025-04-22 | End: 2025-04-25 | Stop reason: HOSPADM

## 2025-04-22 RX ORDER — FAMOTIDINE 40 MG/1
40 TABLET, FILM COATED ORAL EVERY MORNING
Qty: 30 TABLET | Refills: 5 | OUTPATIENT
Start: 2025-04-22

## 2025-04-22 RX ADMIN — ATORVASTATIN CALCIUM 20 MG: 20 TABLET, FILM COATED ORAL at 22:26

## 2025-04-22 RX ADMIN — PIPERACILLIN AND TAZOBACTAM 4.5 G: 4; .5 INJECTION, POWDER, FOR SOLUTION INTRAVENOUS; PARENTERAL at 16:36

## 2025-04-22 RX ADMIN — VANCOMYCIN HYDROCHLORIDE 2250 MG: 5 INJECTION, POWDER, LYOPHILIZED, FOR SOLUTION INTRAVENOUS at 17:12

## 2025-04-22 RX ADMIN — FINASTERIDE 5 MG: 5 TABLET, FILM COATED ORAL at 22:34

## 2025-04-22 RX ADMIN — ONDANSETRON 4 MG: 2 INJECTION INTRAMUSCULAR; INTRAVENOUS at 13:47

## 2025-04-22 RX ADMIN — FUROSEMIDE 40 MG: 10 INJECTION, SOLUTION INTRAMUSCULAR; INTRAVENOUS at 16:33

## 2025-04-22 RX ADMIN — ONDANSETRON 4 MG: 2 INJECTION INTRAMUSCULAR; INTRAVENOUS at 17:00

## 2025-04-22 RX ADMIN — Medication 10 ML: at 22:35

## 2025-04-22 RX ADMIN — SODIUM CHLORIDE 1000 ML: 9 INJECTION, SOLUTION INTRAVENOUS at 13:47

## 2025-04-22 RX ADMIN — DULOXETINE 30 MG: 30 CAPSULE, DELAYED RELEASE ORAL at 22:34

## 2025-04-22 RX ADMIN — TAMSULOSIN HYDROCHLORIDE 0.4 MG: 0.4 CAPSULE ORAL at 22:33

## 2025-04-22 RX ADMIN — MONTELUKAST 10 MG: 10 TABLET, FILM COATED ORAL at 22:26

## 2025-04-22 RX ADMIN — BUSPIRONE HYDROCHLORIDE 15 MG: 15 TABLET ORAL at 22:26

## 2025-04-22 RX ADMIN — APIXABAN 5 MG: 5 TABLET, FILM COATED ORAL at 22:26

## 2025-04-22 RX ADMIN — PIPERACILLIN AND TAZOBACTAM 4.5 G: 4; .5 INJECTION, POWDER, FOR SOLUTION INTRAVENOUS; PARENTERAL at 22:34

## 2025-04-22 NOTE — PROGRESS NOTES
"[facility] Clinical Pharmacy Services: Vancomycin Pharmacokinetic Initial Consult Note    Preston Wallis is a 59 y.o. male who is on day 1 of pharmacy to dose vancomycin for Bacteremia and Intra-Abdominal Infection.    Consult Information  Consulting Provider: LUZ MARINA Aquino  Planned Duration of Therapy: 7 days  Was Patient Receiving Prior to Admission/Consult?: No  Loading Dose Ordered or Given: 2250 mg on  at 1712  PK/PD Target: -600 mg/L.hr     Other Antimicrobials: Piperacillin/Tazobactam    Imaging Reviewed?: Yes    Microbiology Data  MRSA PCR performed: No; Result: hx of MRSA and VRE  Culture/Source:   Microbiology Results (last 10 days)       ** No results found for the last 240 hours. **              Vitals/Labs  Ht: 175.3 cm (69\"); Wt: 107 kg (235 lb 7.2 oz)  Temp (24hrs), Av.6 °F (36.4 °C), Min:97.5 °F (36.4 °C), Max:97.7 °F (36.5 °C)   Estimated Creatinine Clearance: 53.5 mL/min (A) (by C-G formula based on SCr of 1.79 mg/dL (H)).       Results from last 7 days   Lab Units 25  1212 25  1245   CREATININE mg/dL 1.79* 1.94*   WBC 10*3/mm3 15.19* 15.60*     Assessment/Plan:    Vancomycin Dose:  1500 mg IV every 24 hours; which provides the following predicted parameters:  Regimen: 1500 mg IV every 24 hours.  Start time: 09:00 on 2025  Exposure target: AUC24 (range)400-600 mg/L.hr   AUC24,ss: 525 mg/L.hr  Probability of AUC24 > 400: 78 %  Ctrough,ss: 16.3 mg/L  Probability of Ctrough,ss > 20: 32 %  Probability of nephrotoxicity (Lodise BRADEN ): 12 %  Vanc Random planned for   Patient has order for Basic Metabolic Panel    Pharmacy will follow patient's kidney function and will adjust doses and obtain levels as necessary. Thank you for involving pharmacy in this patient's care. Please contact pharmacy with any questions or concerns.                           Mishel Saldivar, PharmD  Clinical Pharmacist    "

## 2025-04-22 NOTE — OUTREACH NOTE
AMBULATORY CASE MANAGEMENT NOTE    Names and Relationships of Patient/Support Persons: Contact: Mel, home health; Relationship:  -     Mel called while in the home with Gómez.  She is reporting that she was supposed to repeat a CBC today but he is dehydrated due to the vomiting.  He started on the antibiotic on Friday and has been vomiting since Saturday.    She also reports that he is wheezing but that is nothing too uncommon for him.  I asked if he has not had a neb treatment this morning, to go ahead and have him do his treatment and then reassess his wheezing.  He is supposed to follow up with Marga Nelson tomorrow in [facility] if he feels like going.    [facility] still has not come for CXR.    Speaking with PCP - with the patient being dehydrated and vomiting for 4 days, possible septicemia ( urine or pneumonia), he needs eval at ER for fluids, cbc and cxr.  Mel informed.     Tara GOMEZ  Ambulatory Case Management    4/22/2025, 10:41 EDT

## 2025-04-22 NOTE — H&P
[facility]   HISTORY AND PHYSICAL    Patient Name: Preston Wallis  : 1965  MRN: [id number]  Primary Care Physician:  Kimmy Riley MD  Date of admission: 2025    Subjective   Subjective     Chief Complaint: Nausea/vomiting    This is a 59-year-old gentleman with a history of heart failure with mildly reduced ejection fraction, chronic kidney disease stage 3b, COPD on 2 L home oxygen, A-fib on Eliquis, dyslipidemia, type 2 diabetes, HTN, chronic sacral decubitus ulcer, bedbound status, BPH, GERD, colostomy in place who is presenting with a complaint of nausea and vomiting.  Patient reports a 4-5-day history of nausea and vomiting.  He has been intermittently tolerant of oral intake.  He had lab work checked by his primary care physician approximately 1 week ago and was found to have an elevated white blood cell count and evidence of UTI.  The patient was given p.o. antibiotics.  He reports no dysuria, although, he does have to straight cath himself every couple of hours so he has difficulty determining if it is any more painful than normal.  He does report increased frequency of having to straight cath himself.  He presented today because his home nurse told him he should be evaluated for the nausea and vomiting.  The patient does report swelling of both of his legs.  He is unsure if he takes a diuretic at home or not.    In the emergency room, the patient had lab work and CT scans of his chest, abdomen, pelvis done.  CT revealed no acute abnormality.  His lab work showed most of his labs were near his baseline, however, the patient's white blood cell count remained elevated from approximately 1 week ago.  He denies any steroid use. He is being admitted for evaluation of suspected infection.        Review of Systems   Constitutional:  Negative for chills, diaphoresis, fatigue and fever.   HENT:  Negative for facial swelling, rhinorrhea, sinus pressure, sore throat, trouble swallowing and voice  "change.    Eyes:  Negative for photophobia, redness and visual disturbance.   Respiratory:  Negative for cough, shortness of breath and wheezing.    Cardiovascular:  Positive for leg swelling. Negative for chest pain and palpitations.   Gastrointestinal:  Positive for nausea and vomiting. Negative for abdominal pain, anal bleeding, blood in stool, constipation and diarrhea.   Endocrine: Negative for polyuria.   Genitourinary:  Negative for difficulty urinating, dysuria, flank pain, frequency, hematuria and urgency.   Musculoskeletal:  Negative for arthralgias, back pain, gait problem, joint swelling, myalgias, neck pain and neck stiffness.   Skin:  Negative for rash and wound.   Allergic/Immunologic: Negative for immunocompromised state.   Neurological:  Negative for dizziness, tremors, seizures, syncope, facial asymmetry, speech difficulty, weakness, light-headedness, numbness and headaches.   Hematological:  Does not bruise/bleed easily.   Psychiatric/Behavioral:  Negative for agitation, confusion and hallucinations. The patient is not nervous/anxious.        Personal History     Past Medical History:   Diagnosis Date    1. Incision and drainage of posterior perianal abscess 2. Sharp excisional debridement through skin and subcutaneous tissue in the posterior perianal area, 9 x 6 x 1 cm 01/26/2025    Age-related cognitive decline     Allergic contact dermatitis     Allergies     Anemia     Bedbound     11/2023 \"MY LEG MUSCLES STOPPED WORKING\"    Bronchiectasis with acute lower respiratory infection     Charcot foot due to diabetes mellitus 09/10/2013    Chronic diastolic (congestive) heart failure     Chronic kidney disease     Chronic respiratory failure with hypoxia     Closed supracondylar fracture of femur 01/12/2022    COPD (chronic obstructive pulmonary disease)     Deep vein thrombosis (DVT) of lower extremity associated with air travel 01/13/2023    Dependence on supplemental oxygen     Eczema     Erectile " "dysfunction     due to organic reasons    Essential (primary) hypertension     Fracture     closed fracture of other tarsal and metatarsal bones    Fracture of proximal humerus 01/13/2023    GERD without esophagitis     High risk medication use     Hypercholesteremia     Hypomagnesemia     Infected stasis ulcer of left lower extremity 01/13/2023    Insomnia     Low back pain     Major depressive disorder     Morbid (severe) obesity due to excess calories     MRSA pneumonia     Muscle weakness     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 without evidence of necrosis     Obstructive sleep apnea (adult) (pediatric)     On home O2     REPORTS WEARING 2L/NC AAT    Other forms of dyspnea     Other long term (current) drug therapy     Other specified noninfective gastroenteritis and colitis     Other spondylosis, lumbar region     Pain in both knees     Paroxysmal atrial fibrillation     Peripheral neuropathy     attributed to type 2 diabetes    Pneumonia, unspecified organism     Polyneuropathy     Rash and other nonspecific skin eruption     Self-catheterizes urinary bladder     EVERY 4 HOURS    Smoking     \"SOMETIMES\"    Syncope and collapse     Tachycardia     Tinnitus 01/13/2023    Type 1 diabetes mellitus with diabetic chronic kidney disease     Type 2 diabetes mellitus     Unspecified fall, initial encounter     Urinary retention        Past Surgical History:   Procedure Laterality Date    BRONCHOSCOPY N/A 1/28/2025    Procedure: BRONCHOSCOPY: BAL: insertion of lighted instrument to view inside the lung;  Surgeon: Walter Nicole DO;  Location: [facility] OR;  Service: Pulmonary;  Laterality: N/A;    BRONCHOSCOPY N/A 2/3/2025    Procedure: BRONCHOSCOPY WITH BRONCHOALVEOLAR LAVAGE, POSSIBLE BIOPSY, BRUSHING, WASHING, AIRWAY INSPECTION: insertion of lighted instrument to view inside the lung;  Surgeon: Chadd Swan MD;  Location: [facility] MAIN OR;  Service: " Pulmonary;  Laterality: N/A;    CARDIAC CATHETERIZATION Left 8/15/2024    Procedure: Carbon dioxide aortogram with left leg angiogram, possible angioplasty or stenting;  Surgeon: Moshe Willson MD;  Location: [facility] CATH INVASIVE LOCATION;  Service: Vascular;  Laterality: Left;    CHOLECYSTECTOMY      COLOSTOMY N/A 2/6/2025    Procedure: COLOSTOMY LAPAROSCOPIC; plain text: creation of colostomy using small incisions;  Surgeon: Emerson Canada MD;  [place] [facility] [place] [facility]  Service: General;  Laterality: N/A;    CYSTOSCOPY      ENDOSCOPY N/A 3/18/2025    Procedure: ESOPHAGOGASTRODUODENOSCOPY WITH BIOPSIES;  Surgeon: Rafael Hernandez MD;  Location: [facility] ENDOSCOPY;  Service: Gastroenterology;  Laterality: N/A;  HIATAL HERNIA, REFLUX ESOPHAGITIS    FEMUR SURGERY Left     Shravan placed    INCISION AND DRAINAGE ABSCESS N/A 1/26/2025    Procedure: INCISION AND DRAINAGE ABSCESS; plain text: incision and drain pus from buttocks wound;  Surgeon: Emerson Canada MD;  [place] [facility] [place] [facility]  Service: General;  Laterality: N/A;    KNEE SURGERY Left     OTHER SURGICAL HISTORY Left     venous port, REMOVED    PORTACATH PLACEMENT Right     TIBIAL PLATEAU OPEN REDUCTION INTERNAL FIXATION Left 12/22/2023    Procedure: TIBIAL PLATEAU OPEN REDUCTION INTERNAL FIXATION;  Surgeon: Hugo Kline MD;  Location: [facility];  Service: Orthopedics;  Laterality: Left;    TONSILLECTOMY AND ADENOIDECTOMY         Family History: family history includes Asthma in his father; Cancer in his sister; Coronary artery disease in his mother; Diabetes type II in his mother and sister; Hypertension in his mother. Otherwise pertinent FHx was reviewed and not pertinent to current issue.    Social History:  reports that he quit smoking about 32 years ago. His smoking use included cigarettes. He started smoking about 44 years ago. He has a 12 pack-year smoking history. He has been exposed to tobacco smoke. He has never used smokeless  tobacco. He reports that he does not currently use alcohol. He reports that he does not use drugs.    Home Medications:  BASAGLAR KWIKPEN, DULoxetine, Insulin Lispro (1 Unit Dial), Rosalino Nutrivigor, Magnesium Oxide -Mg Supplement, Multi-Vitamin/Iron, Povidone-Iodine, Semaglutide (1 MG/DOSE), Vitamin D3, apixaban, arformoterol, aspirin, atorvastatin, budesonide, busPIRone, calcium citrate, collagenase, dapagliflozin, dilTIAZem CD, docusate sodium, famotidine, ferrous gluconate, finasteride, folic acid, ipratropium-albuterol, montelukast, nitrofurantoin (macrocrystal-monohydrate), ondansetron ODT, pantoprazole, sodium hypochlorite, tamsulosin, tiotropium, tobramycin PF, vitamin C, and zinc sulfate    Allergies:  Allergies   Allergen Reactions    Benadryl [Diphenhydramine] Itching    Proventil [Albuterol] Other (See Comments)     Mouth sores         Objective    Objective     Vitals:   Temp:  [97.5 °F (36.4 °C)-97.7 °F (36.5 °C)] 97.7 °F (36.5 °C)  Heart Rate:  [91-98] 98  Resp:  [16-20] 18  BP: (166-184)/(78-84) 184/84  Flow (L/min) (Oxygen Therapy):  [2] 2    Physical Exam  Constitutional:       General: He is not in acute distress.     Appearance: Normal appearance. He is obese. He is not ill-appearing, toxic-appearing or diaphoretic.   HENT:      Head: Normocephalic and atraumatic.      Mouth/Throat:      Mouth: Mucous membranes are moist.      Pharynx: Oropharynx is clear.   Eyes:      General: No scleral icterus.        Right eye: No discharge.         Left eye: No discharge.      Extraocular Movements: Extraocular movements intact.      Conjunctiva/sclera: Conjunctivae normal.   Cardiovascular:      Rate and Rhythm: Normal rate and regular rhythm.      Heart sounds: Normal heart sounds. No murmur heard.  Pulmonary:      Effort: Pulmonary effort is normal. No respiratory distress.      Breath sounds: No stridor. Rhonchi and rales present. No wheezing.   Abdominal:      General: Abdomen is flat. Bowel sounds are  normal. There is no distension.      Tenderness: There is no abdominal tenderness. There is no guarding.      Comments: Ostomy noted   Musculoskeletal:         General: No tenderness or signs of injury.      Cervical back: No tenderness.      Right lower leg: Edema present.      Left lower leg: Edema present.   Skin:     General: Skin is warm and dry.      Coloration: Skin is not jaundiced or pale.      Comments: Stage III sacral pressure ulcer; stage I left heel pressure ulcer   Neurological:      General: No focal deficit present.      Mental Status: He is alert and oriented to person, place, and time. Mental status is at baseline.      Cranial Nerves: No cranial nerve deficit.      Motor: No weakness.   Psychiatric:         Mood and Affect: Mood normal.         Thought Content: Thought content normal.         Result Review    Result Review:  I have personally reviewed the results from the time of this admission to 4/22/2025 16:15 EDT and agree with these findings:  [x]  Laboratory list / accordion  [x]  Microbiology  [x]  Radiology  [x]  EKG/Telemetry   [x]  Cardiology/Vascular   []  Pathology  [x]  Old records  []  Other:  Most notable findings include: History of ESBL and MDR infections      Assessment & Plan   Assessment / Plan     Brief Patient Summary:  Preston Wallis is a 59 y.o. male who is presenting for nausea and vomiting.  He was found to have a leukocytosis and is being admitted for further evaluation of infection given his prior history of MDR and ESBL infections.    Active Hospital Problems:  Active Hospital Problems    Diagnosis     **Nausea and vomiting          Nausea and vomiting  -Abdominal exam rather unremarkable  -CT cap unrevealing of acute pathology  -UA unremarkable  -f/u blood cx  -Zosyn   -possibly gastritis  -prn zofran  -CLD, ADAT    Acute exacerbation of HFmrEF  -EF: 49%  -2g salt restriction, 2L fluid restriction  -Strict I/Os, daily weights  -Telemetry ordered  -Diurese with: lasix  40 iv bid   -Net I/O: 0    Anemia of renal disease  -Trend hgb  -transfuse as needed    Type 2 Diabetes mellitus  -Insulin dependent   -A1c: 7.10  -Blood glucose mostly controlled  -BG checks q4h   -Low  dose SSI  -Long acting: 15u qhs    CKD3b  -renal function near baseline  -renal dose meds, avoid nephrotoxins  -Cont home farxiga    Afib  -Telemetry ordered  -Currently rate controlled   -Continue home: cardizem  -Anticoag: eliquis    Hypertension  -PRN labetalol    COPD  -on 2L home o2  -no ae  -prn duoneb    Decubitus ulcers  -Stage 3 on sacrum, stage 1 on L heel  -wound care consult    Ostomy in place  -ostomy RN consulted    HLD  -Cont home asa, lipitor    Anxiety/depression  -Cont home buspar bid, duloxetine    GERD  -Cont home pepcid, protonix    BPH  -Cont home flomax, proscar      VTE Prophylaxis:  Pharmacologic VTE prophylaxis orders are signed & held.          CODE STATUS:    Code Status (Patient has no pulse and is not breathing): CPR (Attempt to Resuscitate)  Medical Interventions (Patient has pulse or is breathing): Full Support  Level Of Support Discussed With: Patient    Admission Status:  I believe this patient meets inpatient status.    Ian Aquino MD

## 2025-04-22 NOTE — ED NOTES
This RN spoke with Dr. Aquino via telephone at this time regarding a patient concern. Patient reporting that the consulting physician informed patient that he would order a medication for his for nausea, timed now. This RN noticed in Epic that the last given dose was 1347. The PRN order for zofran from Dr. Aquino is timed for every 6 hours PRN. This RN spoke with provider who told this RN to proceed with 4mg zofran IV at this time to begin his Q6 hour dose, regardless of the previously administered dose.

## 2025-04-22 NOTE — ED PROVIDER NOTES
"Time: 1:27 PM EDT  Date of encounter:  4/22/2025  Independent Historian/Clinical History and Information was obtained by:   Patient and Family    History is limited by: N/A    Chief Complaint: Nausea and vomiting, weakness      History of Present Illness:  Patient is a 59 y.o. year old male with history of diabetes, left Charcot foot causing him to be bedridden for the past couple years, diastolic heart failure, hypertension, who presents to the emergency department for evaluation of nausea and vomiting the past 4 to 5 days at home, feels weak and dehydrated.    No significant diarrhea, having some output in his colostomy bag.    No obvious cough or congestion.    He gets in and out urinary catheters at home for his urinary retention and has a history of UTIs.    No fevers reported.          Patient Care Team  Primary Care Provider: Kimmy Riley MD    Past Medical History:     Allergies   Allergen Reactions    Benadryl [Diphenhydramine] Itching    Proventil [Albuterol] Other (See Comments)     Mouth sores       Past Medical History:   Diagnosis Date    1. Incision and drainage of posterior perianal abscess 2. Sharp excisional debridement through skin and subcutaneous tissue in the posterior perianal area, 9 x 6 x 1 cm 01/26/2025    Age-related cognitive decline     Allergic contact dermatitis     Allergies     Anemia     Bedbound     11/2023 \"MY LEG MUSCLES STOPPED WORKING\"    Bronchiectasis with acute lower respiratory infection     Charcot foot due to diabetes mellitus 09/10/2013    Chronic diastolic (congestive) heart failure     Chronic kidney disease     Chronic respiratory failure with hypoxia     Closed supracondylar fracture of femur 01/12/2022    COPD (chronic obstructive pulmonary disease)     Deep vein thrombosis (DVT) of lower extremity associated with air travel 01/13/2023    Dependence on supplemental oxygen     Eczema     Erectile dysfunction     due to organic reasons    Essential (primary) " "hypertension     Fracture     closed fracture of other tarsal and metatarsal bones    Fracture of proximal humerus 01/13/2023    GERD without esophagitis     High risk medication use     Hypercholesteremia     Hypomagnesemia     Infected stasis ulcer of left lower extremity 01/13/2023    Insomnia     Low back pain     Major depressive disorder     Morbid (severe) obesity due to excess calories     MRSA pneumonia     Muscle weakness     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 without evidence of necrosis     Obstructive sleep apnea (adult) (pediatric)     On home O2     REPORTS WEARING 2L/NC AAT    Other forms of dyspnea     Other long term (current) drug therapy     Other specified noninfective gastroenteritis and colitis     Other spondylosis, lumbar region     Pain in both knees     Paroxysmal atrial fibrillation     Peripheral neuropathy     attributed to type 2 diabetes    Pneumonia, unspecified organism     Polyneuropathy     Rash and other nonspecific skin eruption     Self-catheterizes urinary bladder     EVERY 4 HOURS    Smoking     \"SOMETIMES\"    Syncope and collapse     Tachycardia     Tinnitus 01/13/2023    Type 1 diabetes mellitus with diabetic chronic kidney disease     Type 2 diabetes mellitus     Unspecified fall, initial encounter     Urinary retention      Past Surgical History:   Procedure Laterality Date    BRONCHOSCOPY N/A 1/28/2025    Procedure: BRONCHOSCOPY: BAL: insertion of lighted instrument to view inside the lung;  Surgeon: Walter Nicole DO;  Location: [facility] MAIN OR;  Service: Pulmonary;  Laterality: N/A;    BRONCHOSCOPY N/A 2/3/2025    Procedure: BRONCHOSCOPY WITH BRONCHOALVEOLAR LAVAGE, POSSIBLE BIOPSY, BRUSHING, WASHING, AIRWAY INSPECTION: insertion of lighted instrument to view inside the lung;  Surgeon: Chadd Swan MD;  Location: [facility] MAIN OR;  Service: Pulmonary;  Laterality: N/A;    CARDIAC CATHETERIZATION Left " 8/15/2024    Procedure: Carbon dioxide aortogram with left leg angiogram, possible angioplasty or stenting;  Surgeon: Moshe Willson MD;  Location: [facility] CATH INVASIVE LOCATION;  Service: Vascular;  Laterality: Left;    CHOLECYSTECTOMY      COLOSTOMY N/A 2/6/2025    Procedure: COLOSTOMY LAPAROSCOPIC; plain text: creation of colostomy using small incisions;  Surgeon: Emerson Canada MD;  Location: [facility] OR OSC;  Service: General;  Laterality: N/A;    CYSTOSCOPY      ENDOSCOPY N/A 3/18/2025    Procedure: ESOPHAGOGASTRODUODENOSCOPY WITH BIOPSIES;  Surgeon: Rafael Hernandez MD;  Location: [facility] ENDOSCOPY;  Service: Gastroenterology;  Laterality: N/A;  HIATAL HERNIA, REFLUX ESOPHAGITIS    FEMUR SURGERY Left     Shravan placed    INCISION AND DRAINAGE ABSCESS N/A 1/26/2025    Procedure: INCISION AND DRAINAGE ABSCESS; plain text: incision and drain pus from buttocks wound;  Surgeon: Emerson Canada MD;  Location: [facility] OR [facility] [place];  Service: General;  Laterality: N/A;    KNEE SURGERY Left     OTHER SURGICAL HISTORY Left     venous port, REMOVED    PORTACATH PLACEMENT Right     TIBIAL PLATEAU OPEN REDUCTION INTERNAL FIXATION Left 12/22/2023    Procedure: TIBIAL PLATEAU OPEN REDUCTION INTERNAL FIXATION;  Surgeon: Hugo Kline MD;  Location: [facility];  Service: Orthopedics;  Laterality: Left;    TONSILLECTOMY AND ADENOIDECTOMY       Family History   Problem Relation Age of Onset    Coronary artery disease Mother     Hypertension Mother     Diabetes type II Mother     Asthma Father     Diabetes type II Sister     Cancer Sister     Malig Hyperthermia Neg Hx        Home Medications:  Prior to Admission medications    Medication Sig Start Date End Date Taking? Authorizing Provider   arformoterol (BROVANA) 15 MCG/2ML nebulizer solution Take 2 mL by nebulization 2 (Two) Times a Day. 8/22/24   Betzaida Nelson APRN   Aspirin Low Dose 81 MG EC tablet TAKE 1 TABLET BY MOUTH EVERY MORNING 4/22/25   Kimmy Riley  MD Georg[place]   atorvastatin (LIPITOR) 20 MG tablet TAKE 1 TABLET BY MOUTH EVERY NIGHT AT BEDTIME 4/22/25   Kimmy Riley MD   budesonide (Pulmicort) 0.5 MG/2ML nebulizer solution Take 2 mL by nebulization 2 (Two) Times a Day. 8/22/24   Betzaida Nelson APRN   busPIRone (BUSPAR) 15 MG tablet TAKE 1 TABLET BY MOUTH TWICE DAILY 4/22/25   Kimmy Riley MD   calcium citrate (CALCITRATE) 950 (200 Ca) MG tablet TAKE 1 TABLET BY MOUTH EVERY NIGHT AT BEDTIME 4/22/25   Kimmy iRley MD   Cholecalciferol (Vitamin D3) 50 MCG (2000 UT) tablet TAKE 1 TABLET BY MOUTH EVERY MORNING 4/22/25   Kimmy Riley MD   collagenase (Santyl) 250 UNIT/GM ointment Apply 1 Application topically to the appropriate area as directed Daily. 3/25/25   Katerin Torres MD   dapagliflozin (Farxiga) 5 MG tablet tablet Take 1 tablet by mouth Every Morning. 4/22/25   Kimmy Riley MD   dilTIAZem CD (CARDIZEM CD) 240 MG 24 hr capsule Take 1 capsule by mouth Every Morning. 4/22/25   Kimmy Riley MD   docusate sodium (COLACE) 100 MG capsule TAKE 1 CAPSULE BY MOUTH TWICE DAILY 4/22/25   Kimmy Riley MD   DULoxetine (Cymbalta) 30 MG capsule Take 1 capsule by mouth Daily. 4/1/25   Kimmy Riley MD   Eliquis 5 MG tablet tablet TAKE 1 TABLET BY MOUTH EVERY TWELVE HOURS 4/22/25   Kimmy Riley MD   ferrous gluconate (FERGON) 324 MG tablet Take 1 tablet by mouth Every Morning. 4/22/25   Kimmy Riley MD   finasteride (PROSCAR) 5 MG tablet TAKE 1 TABLET BY MOUTH EVERY NIGHT AT BEDTIME 4/22/25   Kimmy Riley MD   folic acid (FOLVITE) 1 MG tablet TAKE 1 TABLET BY MOUTH EVERY NIGHT AT BEDTIME 4/22/25   Kimmy Riley MD   Insulin Glargine (BASAGLAR KWIKPEN) 100 UNIT/ML injection pen Inject 15 Units under the skin into the appropriate area as directed Daily for 180 days. 4/2/25 9/29/25  Pawan Wallis, CON   Insulin Lispro, 1 Unit Dial, (HUMALOG) 100 UNIT/ML solution pen-injector  Inject 5 Units under the skin into the appropriate area as directed 3 (Three) Times a Day Before Meals. 2/20/25   Kevon Vargas MD   ipratropium-albuterol (DUO-NEB) 0.5-2.5 mg/3 ml nebulizer Take 3 mL by nebulization Every 4 (Four) Hours As Needed for Wheezing or Shortness of Air. 8/22/24   Betzaida Nelson APRN   Magnesium Oxide -Mg Supplement 400 (240 Mg) MG tablet TAKE 1 TABLET BY MOUTH EVERY NIGHT AT BEDTIME 4/22/25   Kimmy Riley MD   montelukast (SINGULAIR) 10 MG tablet Take 1 tablet by mouth Every Night. 4/22/25   Kimmy Riley MD   Multiple Vitamins-Iron (Multi-Vitamin/Iron) tablet TAKE 1 TABLET BY MOUTH EVERY MORNING 4/22/25   Kimmy Riley MD   nitrofurantoin, macrocrystal-monohydrate, (Macrobid) 100 MG capsule Take 1 capsule by mouth 2 (Two) Times a Day. 4/18/25   Kimmy Riley MD   nitrofurantoin, macrocrystal-monohydrate, (Macrobid) 100 MG capsule Take 1 capsule by mouth 2 (Two) Times a Day for 5 days. 4/18/25 4/23/25  Kimmy Riley MD   Nutritional Supplements (Rosalino Nutrivigor) pack Take 1 packet by mouth 2 (Two) Times a Day. 4/1/25   Kimmy Riley MD   ondansetron ODT (ZOFRAN-ODT) 4 MG disintegrating tablet Place 1 tablet on the tongue Every 8 (Eight) Hours As Needed for Nausea or Vomiting. 4/21/25   Kimmy Riley MD   pantoprazole (PROTONIX) 40 MG EC tablet Take 1 tablet by mouth Every Morning. 4/22/25   Kimmy Riley MD   Povidone-Iodine (Betadine) 5 % external solution 5% Apply 1 mL topically to the appropriate area as directed 2 (Two) Times a Day. Left Heel    ProviderBreann MD   Semaglutide, 1 MG/DOSE, (Ozempic, 1 MG/DOSE,) 4 MG/3ML solution pen-injector Inject 1 mg under the skin into the appropriate area as directed 1 (One) Time Per Week. 12/11/24   Neli Paredes APRN   Sodium Hypochlorite (Anasept) 0.057 % liquid Apply 1 Application topically Daily. Sacrum    Provider, MD Breann   Sodium Hypochlorite (Anasept)  0.057 % liquid Apply 1 Application topically As Needed (This is in addition to the QD scheduled application). Sacrum    ProviderBreann MD   sodium hypochlorite (DAKIN'S 1/4 STRENGTH) 0.125 % solution topical solution 0.125% Apply 10 mL topically to the appropriate area as directed 2 (Two) Times a Day. 3/25/25   Katerin Torres MD   tamsulosin (FLOMAX) 0.4 MG capsule 24 hr capsule TAKE 1 CAPSULE BY MOUTH EVERY NIGHT AT BEDTIME 4/22/25   Kimmy Riley MD   tiotropium (SPIRIVA) 18 MCG per inhalation capsule Place 1 capsule into inhaler and inhale Daily. 10/31/24   Martín Rivera MD   tobramycin PF (MOIZ) 300 MG/5ML nebulizer solution Take 5 mL by nebulization 2 (Two) Times a Day for 28 days. nebulize 1 vial BY MOUTH TWICE DAILY FOR 28 DAYS ON AND 28 DAYS OFF 3/27/25 4/24/25  Leonard Multani,    vitamin C (ASCORBIC ACID) 500 MG tablet TAKE 1 TABLET BY MOUTH TWICE DAILY 4/22/25   Kimmy Riley MD   zinc sulfate (ZINCATE) 220 (50 Zn) MG capsule Take 1 capsule by mouth Every Morning. 4/22/25   Kimmy Riley MD   Aspirin Low Dose 81 MG EC tablet TAKE 1 TABLET BY MOUTH EVERY MORNING 11/25/24 4/22/25  Kimmy Riley MD   atorvastatin (LIPITOR) 20 MG tablet TAKE 1 TABLET BY MOUTH EVERY NIGHT AT BEDTIME 11/25/24 4/22/25  Kimmy Riley MD   busPIRone (BUSPAR) 15 MG tablet TAKE 1 TABLET BY MOUTH TWICE DAILY 11/25/24 4/22/25  Kimmy Riley MD   calcium citrate (CALCITRATE) 950 (200 Ca) MG tablet TAKE 1 TABLET BY MOUTH EVERY NIGHT AT BEDTIME 11/25/24 4/22/25  Kimmy Riley MD   Cholecalciferol (Vitamin D3) 50 MCG (2000 UT) tablet TAKE 1 TABLET BY MOUTH EVERY MORNING 11/25/24 4/22/25  Kimmy Riley MD   dapagliflozin (Farxiga) 5 MG tablet tablet Take 1 tablet by mouth Every Morning. 4/18/25 4/22/25  Kimmy Riley MD   dilTIAZem CD (CARDIZEM CD) 240 MG 24 hr capsule Take 1 capsule by mouth Daily for 30 days. 3/20/25 4/22/25  Leonard Multani DO   docusate sodium  (COLACE) 100 MG capsule TAKE 1 CAPSULE BY MOUTH TWICE DAILY 24  Kimmy Riley MD   Eliquis 5 MG tablet tablet TAKE 1 TABLET BY MOUTH EVERY TWELVE HOURS 24  Kimmy Riley MD   ferrous gluconate (FERGON) 324 MG tablet Take 1 tablet by mouth Every Morning. 25  Kimmy Riley MD   finasteride (PROSCAR) 5 MG tablet TAKE 1 TABLET BY MOUTH EVERY NIGHT AT BEDTIME 24  Kimmy Riley MD   folic acid (FOLVITE) 1 MG tablet TAKE 1 TABLET BY MOUTH EVERY NIGHT AT BEDTIME 24  Kimmy Riley MD   Magnesium Oxide -Mg Supplement 400 (240 Mg) MG tablet Take 1 tablet by mouth every night at bedtime. 25  Breann Shukla MD   montelukast (SINGULAIR) 10 MG tablet Take 1 tablet by mouth Every Night. 24  Betzaida Nelson APRN   Multiple Vitamins-Iron (GNP One Daily Plus Iron) tablet TAKE 1 TABLET BY MOUTH EVERY MORNING 24  Kimmy Riley MD   pantoprazole (PROTONIX) 40 MG EC tablet Take 1 tablet by mouth Every Morning. 3/20/25 4/22/25  Leonard Multani DO   tamsulosin (FLOMAX) 0.4 MG capsule 24 hr capsule TAKE 1 CAPSULE BY MOUTH EVERY NIGHT AT BEDTIME 24  Kimmy Riley MD   vitamin C (ASCORBIC ACID) 500 MG tablet TAKE 1 TABLET BY MOUTH TWICE DAILY 24  Kimmy Riley MD   zinc sulfate (ZINCATE) 220 (50 Zn) MG capsule Take 1 capsule by mouth Every Morning.  25  ProviderBreann MD        Social History:   Social History     Tobacco Use    Smoking status: Former     Current packs/day: 0.00     Average packs/day: 1 pack/day for 12.0 years (12.0 ttl pk-yrs)     Types: Cigarettes     Start date:      Quit date: 1993     Years since quittin.3     Passive exposure: Past    Smokeless tobacco: Never   Vaping Use    Vaping status: Never Used   Substance Use Topics    Alcohol use: Not Currently    Drug use: Never         Review of  "Systems:  Review of Systems   I performed a 10 point review of systems which was all negative, except for the positives found in the HPI above.  Physical Exam:  /78 (BP Location: Left arm, Patient Position: Lying)   Pulse 91   Temp 97.5 °F (36.4 °C) (Oral)   Resp 20   Ht 175.3 cm (69\")   Wt 107 kg (235 lb 7.2 oz)   SpO2 95%   BMI 34.77 kg/m²     Physical Exam   General: Awake alert and in no obvious distress, appears chronically ill, holding emesis bag    HEENT: Head normocephalic atraumatic, eyes PERRLA EOMI, nose normal, oropharynx normal.  Mucous membranes look mildly dry, dehydrated    Neck: Supple full range of motion, no meningismus, no lymphadenopathy    Heart: Regular rate and rhythm, no murmurs or rubs, 2+ radial pulses bilaterally    Lungs: Clear to auscultation bilaterally without wheezes or crackles, no respiratory distress    Abdomen: Soft, nontender, nondistended, no rebound or guarding    Skin: Warm, dry, no rash    Musculoskeletal: Normal range of motion, 1+ bilateral lower extremity edema    Neurologic: Oriented x3, no motor deficits no sensory deficits    Psychiatric: Mood appears stable, no psychosis            Medical Decision Making:      Comorbidities that affect care:    Bedbound for 2 years, Atrial Fibrillation, Diabetes, Hypertension    External Notes reviewed:    Previous Labs: I looked at his elevated white blood cell count of 15 today and compared to baseline, and it looks like it is a significant acute rise in the past 5 days.      The following orders were placed and all results were independently analyzed by me:  Orders Placed This Encounter   Procedures    Blood Culture - Blood,    Blood Culture - Blood,    XR Chest 1 View    CT Chest Without Contrast Diagnostic    CT Abdomen Pelvis Without Contrast    [facility] Draw    Comprehensive Metabolic Panel    Lipase    Urinalysis With Microscopic If Indicated (No Culture) - Urine, Clean Catch    Lactic Acid, Plasma    CBC Auto " Differential    Urinalysis, Microscopic Only - Urine, Clean Catch    Procalcitonin    NPO Diet NPO Type: Strict NPO    Undress & Gown    Hospitalist (on-call MD unless specified)    Insert Peripheral IV    CBC & Differential    Green Top (Gel)    Lavender Top    Gold Top - SST    Light Blue Top       Medications Given in the Emergency Department:  Medications   sodium chloride 0.9 % flush 10 mL (has no administration in time range)   vancomycin 2250 mg/500 mL 0.9% NS IVPB ([facility]) (has no administration in time range)   piperacillin-tazobactam (ZOSYN) IVPB 4.5 g IVPB in 100 mL NS (VTB) (has no administration in time range)   sodium chloride 0.9 % bolus 1,000 mL (1,000 mL Intravenous New Bag 4/22/25 1347)   ondansetron (ZOFRAN) injection 4 mg (4 mg Intravenous Given 4/22/25 1347)        ED Course:         Labs:    Lab Results (last 24 hours)       Procedure Component Value Units Date/Time    CBC & Differential [[id number]]  (Abnormal) Collected: 04/22/25 1212    Specimen: Blood Updated: 04/22/25 1220    Narrative:      The following orders were created for panel order CBC & Differential.  Procedure                               Abnormality         Status                     ---------                               -----------         ------                     CBC Auto Differential[[id number]]        Abnormal            Final result                 Please view results for these tests on the individual orders.    Comprehensive Metabolic Panel [[id number]]  (Abnormal) Collected: 04/22/25 1212    Specimen: Blood Updated: 04/22/25 1236     Glucose 139 mg/dL      BUN 26 mg/dL      Creatinine 1.79 mg/dL      Sodium 137 mmol/L      Potassium 4.3 mmol/L      Chloride 97 mmol/L      CO2 31.2 mmol/L      Calcium 9.9 mg/dL      Total Protein 8.5 g/dL      Albumin 3.4 g/dL      ALT (SGPT) 53 U/L      AST (SGOT) 72 U/L      Alkaline Phosphatase 282 U/L      Total Bilirubin 0.2 mg/dL      Globulin 5.1 gm/dL      A/G Ratio 0.7 g/dL       BUN/Creatinine Ratio 14.5     Anion Gap 8.8 mmol/L      eGFR 43.1 mL/min/1.73     Narrative:      GFR Categories in Chronic Kidney Disease (CKD)      GFR Category          GFR (mL/min/1.73)    Interpretation  G1                     90 or greater         Normal or high (1)  G2                      60-89                Mild decrease (1)  G3a                   45-59                Mild to moderate decrease  G3b                   30-44                Moderate to severe decrease  G4                    15-29                Severe decrease  G5                    14 or less           Kidney failure          (1)In the absence of evidence of kidney disease, neither GFR category G1 or G2 fulfill the criteria for CKD.    eGFR calculation 2021 CKD-EPI creatinine equation, which does not include race as a factor    Lipase [[id number]]  (Normal) Collected: 04/22/25 1212    Specimen: Blood Updated: 04/22/25 1236     Lipase 44 U/L     Lactic Acid, Plasma [[id number]]  (Normal) Collected: 04/22/25 1212    Specimen: Blood Updated: 04/22/25 1234     Lactate 0.9 mmol/L     CBC Auto Differential [[id number]]  (Abnormal) Collected: 04/22/25 1212    Specimen: Blood Updated: 04/22/25 1220     WBC 15.19 10*3/mm3      RBC 3.48 10*6/mm3      Hemoglobin 9.6 g/dL      Hematocrit 31.4 %      MCV 90.2 fL      MCH 27.6 pg      MCHC 30.6 g/dL      RDW 14.2 %      RDW-SD 46.5 fl      MPV 8.7 fL      Platelets 539 10*3/mm3      Neutrophil % 77.5 %      Lymphocyte % 12.6 %      Monocyte % 6.2 %      Eosinophil % 2.4 %      Basophil % 0.7 %      Immature Grans % 0.6 %      Neutrophils, Absolute 11.76 10*3/mm3      Lymphocytes, Absolute 1.92 10*3/mm3      Monocytes, Absolute 0.94 10*3/mm3      Eosinophils, Absolute 0.37 10*3/mm3      Basophils, Absolute 0.11 10*3/mm3      Immature Grans, Absolute 0.09 10*3/mm3      nRBC 0.0 /100 WBC     Procalcitonin [[id number]]  (Normal) Collected: 04/22/25 1212    Specimen: Blood Updated: 04/22/25 1358     Procalcitonin  "0.24 ng/mL     Narrative:      As a Marker for Sepsis (Non-Neonates):    1. <0.5 ng/mL represents a low risk of severe sepsis and/or septic shock.  2. >2 ng/mL represents a high risk of severe sepsis and/or septic shock.    As a Marker for Lower Respiratory Tract Infections that require antibiotic therapy:    PCT on Admission    Antibiotic Therapy       6-12 Hrs later    >0.5                Strongly Recommended  >0.25 - <0.5        Recommended  0.1 - 0.25          Discouraged              Remeasure/reassess PCT  <0.1                Strongly Discouraged     Remeasure/reassess PCT    As 28 day mortality risk marker: \"Change in Procalcitonin Result\" (>80% or <=80%) if Day 0 (or Day 1) and Day 4 values are available. Refer to [email or web address]://[email or web address] Creek Networkspct-[email or web address]    Change in PCT <=80%  A decrease of PCT levels below or equal to 80% defines a positive change in PCT test result representing a higher risk for 28-day all-cause mortality of patients diagnosed with severe sepsis for septic shock.    Change in PCT >80%  A decrease of PCT levels of more than 80% defines a negative change in PCT result representing a lower risk for 28-day all-cause mortality of patients diagnosed with severe sepsis or septic shock.    This test is Prognostic not Diagnostic, if elevated correlate with clinical findings before administering antibiotic treatment.        Urinalysis With Microscopic If Indicated (No Culture) - Straight Cath [[id number]]  (Abnormal) Collected: 04/22/25 1306    Specimen: Urine from Straight Cath Updated: 04/22/25 1326     Color, UA Yellow     Appearance, UA Clear     pH, UA 7.0     Specific Gravity, UA 1.010     Glucose,  mg/dL (Trace)     Ketones, UA Negative     Bilirubin, UA Negative     Blood, UA Negative     Protein, UA >=300 mg/dL (3+)     Leuk Esterase, UA Negative     Nitrite, UA Negative     Urobilinogen, UA 0.2 E.U./dL    Urinalysis, Microscopic Only - Straight Cath [[id number]] Collected: 04/22/25 1306 "    Specimen: Urine from Straight Cath Updated: 04/22/25 1350     RBC, UA 0-2 /HPF      WBC, UA None Seen /HPF      Bacteria, UA None Seen /HPF      Squamous Epithelial Cells, UA 0-2 /HPF      Hyaline Casts, UA None Seen /LPF      Methodology Manual Light Microscopy    Blood Culture - Blood, Arm, Left [[id number]] Collected: 04/22/25 1357    Specimen: Blood from Arm, Left Updated: 04/22/25 1405    Blood Culture - Blood, Arm, Right [[id number]] Collected: 04/22/25 1357    Specimen: Blood from Arm, Right Updated: 04/22/25 1405             Imaging:    CT Chest Without Contrast Diagnostic  Result Date: 4/22/2025  CT CHEST WO CONTRAST DIAGNOSTIC Date of Exam: 4/22/2025 2:52 PM EDT Indication: Eval pneumonia, elevated white blood cell count, abnormal chest x-ray. Comparison: 1/24/2025 Technique: Axial CT images were obtained of the chest without contrast administration.  Reconstructed coronal and sagittal images were also obtained. Automated exposure control and iterative construction methods were used. Findings: There is severe bronchiectasis centrally in both lungs. Airspace disease in the right lower lobe has improved compared with the patient's last study. There are no new infiltrates identified. There is linear scarring particularly in the right lung base. There are smaller nodular densities peripherally in the lungs likely related to mucous plugging. Multiple small nodes are identified in the mediastinum. The largest node in the right paratracheal region has a short axis diameter of 1.2 cm which hasn't changed to the previous exam. There are coronary artery atherosclerotic calcifications. There is trace pericardial fluid, decreased compared with the last study. There is no pleural fluid seen. Upper abdomen is remarkable for prior cholecystectomy. There  are degenerative changes in the spine with DISH. No destructive bone lesions are identified.     Impression: 1.Severe bronchiectasis centrally in both lungs. 2.Improved  right lower lobe infiltrate compared with the patient's last study. 3.No new infiltrates are identified. 4.Small nodular densities peripherally in the lungs likely related to mucous plugging. 5.Trace pericardial fluid, decreased compared with the last study. 6.Coronary artery atherosclerotic calcifications. Electronically Signed: Iam Hope MD  4/22/2025 3:37 PM EDT  Workstation ID: [id number]    XR Chest 1 View  Result Date: 4/22/2025  XR CHEST 1 VW Date of Exam: 4/22/2025 2:13 PM EDT Indication: eval sepsis; hx of PNA Comparison: 3/16/2025 Findings: Right subclavian central venous port again noted. Background of chronic scarring and bronchiectasis again noted. There is volume loss on the right with rightward mediastinal shift again noted. There appears to be port tubing from a previous left subclavian [id number] catheter which is retained. The reservoir is not identified. Small pleural effusions are suspected, right greater than left. No pneumothorax identified. There is severe destructive change in the right humeral head.     Impression: 1.Small pleural effusions, right greater than left. 2.Background of chronic scarring and bronchiectasis with volume loss on the right. It would be difficult to rule out a superimposed infiltrate/pneumonia given the background of chronic lung disease. Electronically Signed: Deshawn Soto MD  4/22/2025 2:23 PM EDT  Workstation ID: [id number]        Differential Diagnosis and Discussion:    Vomiting: Differential diagnosis includes but is not limited to migraine, labyrinthine disorders, psychogenic, metabolic and endocrine causes, peptic ulcer, gastric outlet obstruction, gastritis, gastroenteritis, appendicitis, intestinal obstruction, paralytic ileus, food poisoning, cholecystitis, acute hepatitis, acute pancreatitis, acute febrile illness, and myocardial infarction.  Weakness: Based on the patient's history, signs, and symptoms, the diffential diagnosis includes but is not  limited to meningitis, stroke, sepsis, subarachnoid hemorrhage, intracranial bleeding, encephalitis, acute uti, dehydration, MS, myasthenia gravis, Guillan [place], migraine variant, neuromuscular disorders vertigo, electrolyte imbalance, and metabolic disorders.    PROCEDURES:    Labs were collected in the emergency department and all labs were reviewed and interpreted by me.    No orders to display       Procedures    MDM     Amount and/or Complexity of Data Reviewed  Tests in the radiology section of CPT®: reviewed  Decide to obtain previous medical records or to obtain history from someone other than the patient: yes           This patient is a 59-year-old male with multiple medical comorbidities including diabetes with a Charcot foot, now bedridden for the past few years, sacral decubitus ulcer and pressure ulcer on left heel, history of multiple positive blood and respiratory/sputum and urine cultures.    He is coming in for nausea and vomiting for the past several days and decreased p.o. intake and feeling weak.    I am giving him some IV Zofran and fluids and doing a medical workup including blood work and chest x-ray and urinalysis.    He has a benign, nontender abdomen.    No obvious signs of skin and soft tissue infection related to his ulcers.      I reviewed the images of his CT abdomen pelvis and also CT chest, which shows bronchiectasis, no obvious infiltrate.    It sounds he is not keep anything down and given his worsening lab work and chronic comorbidities, I will plan to admit him to the hospital for further workup.                Patient Care Considerations:          Consultants/Shared Management Plan:    Hospitalist: I have discussed the case with the admitting hospitalist who agrees to accept the patient for admission.    Social Determinants of Health:    Patient has presented with family members who are responsible, reliable and will ensure follow up care.      Disposition and Care  Coordination:    Admit:   Through independent evaluation of the patient's history, physical, and imperical data, the patient meets criteria for inpatient admission to the hospital.        Final diagnoses:   Nausea and vomiting, unspecified vomiting type   Leukocytosis, unspecified type   SIRS (systemic inflammatory response syndrome)        ED Disposition       ED Disposition   Intended Admit    Condition   --    Comment   --               This medical record created using voice recognition software.             Kwaku Madrid MD  04/22/25 1570

## 2025-04-22 NOTE — CASE MANAGEMENT/SOCIAL WORK
Discharge Planning Assessment   [facility]     Patient Name: Preston Wallis  MRN: [id number]  Today's Date: 4/22/2025    Admit Date: 4/22/2025        Discharge Needs Assessment       Row Name 04/22/25 1621       Living Environment    People in Home child(chon), adult;spouse    Current Living Arrangements home    Potentially Unsafe Housing Conditions none    In the past 12 months has the electric, gas, oil, or water company threatened to shut off services in your home? No    Primary Care Provided by self;spouse/significant other    Provides Primary Care For no one, unable/limited ability to care for self    Family Caregiver if Needed child(chon), adult;spouse    Quality of Family Relationships helpful;involved;supportive    Able to Return to Prior Arrangements yes       Resource/Environmental Concerns    Resource/Environmental Concerns none    Transportation Concerns none       Transportation Needs    In the past 12 months, has lack of transportation kept you from medical appointments or from getting medications? no    In the past 12 months, has lack of transportation kept you from meetings, work, or from getting things needed for daily living? No       Food Insecurity    Within the past 12 months, you worried that your food would run out before you got the money to buy more. Never true    Within the past 12 months, the food you bought just didn't last and you didn't have money to get more. Never true       Transition Planning    Patient/Family Anticipates Transition to home with family    Patient/Family Anticipated Services at Transition none    Transportation Anticipated family or friend will provide       Discharge Needs Assessment    Readmission Within the Last 30 Days no previous admission in last 30 days    Current Outpatient/Agency/Support Group homecare agency;clinic(s)    Equipment Currently Used at Home wheelchair;glucometer;commode;wound care supplies;hospital bed;lift device;oxygen;shower chair;walker,  standard;nebulizer    Concerns to be Addressed discharge planning    Do you want help finding or keeping work or a job? I do not need or want help    Do you want help with school or training? For example, starting or completing job training or getting a high school diploma, GED or equivalent No    Anticipated Changes Related to Illness none    Equipment Needed After Discharge none                   Discharge Plan    No documentation.                 Selected Continued Care - Episodes Includes continued care and service providers with selected services from the active episodes listed below             Demographic Summary       Row Name 04/22/25 1619       General Information    Admission Type observation    Arrived From home    Referral Source emergency department    Reason for Consult discharge planning    Preferred Language English       Contact Information    Permission Granted to Share Info With ;, insurance;family/designee    Contact Information Obtained for ;, insurance                   Functional Status       Row Name 04/22/25 1620       Functional Status    Usual Activity Tolerance fair    Current Activity Tolerance fair       Physical Activity    On average, how many days per week do you engage in moderate to strenuous exercise (like a brisk walk)? 0 days    On average, how many minutes do you engage in exercise at this level? 0 min    Number of minutes of exercise per week 0       Assessment of Health Literacy    How often do you have someone help you read hospital materials? Occasionally    How often do you have problems learning about your medical condition because of difficulty understanding written information? Occasionally    How often do you have a problem understanding what is told to you about your medical condition? Occasionally    How confident are you filling out medical forms by yourself? Somewhat    Health Literacy Moderate       Functional  Status, IADL    Medications independent;assistive person    Meal Preparation independent;assistive person    Housekeeping independent;assistive person    Laundry independent;assistive person    Shopping independent;assistive person    If for any reason you need help with day-to-day activities such as bathing, preparing meals, shopping, managing finances, etc., do you get the help you need? I get all the help I need       Mental Status    General Appearance WDL WDL       Mental Status Summary    Recent Changes in Mental Status/Cognitive Functioning no changes       Employment/    Employment Status disabled    Current or Previous Occupation not applicable                   Psychosocial       Row Name 04/22/25 1620       Developmental Stage (Eriksson's Stages of Development)    Developmental Stage Stage 7 (35-65 years/Middle Adulthood) Generativity vs. Stagnation                   Abuse/Neglect       Row Name 04/22/25 1621       Personal Safety    Feels Unsafe at Home or Work/School no    Feels Threatened by Someone no    Does Anyone Try to Keep You From Having Contact with Others or Doing Things Outside Your Home? no    Physical Signs of Abuse Present no                   Legal       Row Name 04/22/25 1621       Financial Resource Strain    How hard is it for you to pay for the very basics like food, housing, medical care, and heating? 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                   Patient Forms    No documentation.                 SW met with patient and wife, Kami at  bedside. Pt reports that he lives at home with his wife and 2 adult children who are all a good support for him. Pt does have assistance with ADL's from wife when needed. Pt is established with Cardiology, Pulmonary, Wound care, Endocrinology, Gastroenterology and Urology. [facility] Pt has a wheelchair, glucometer, bedside commode, wound care supplies, hospital bed, lift device, nebulizer, shower chair and a walker. Pt reports that he is on 2L of O2 full time. Pt does have a depression diagnosis and takes Cymbalta prescribed by his PCP. Pt sees Dr. Kimmy Riley for his PCP and uses [facility] in [place]. No current needs reported at this time.    MONICA Cardona

## 2025-04-23 LAB
ANION GAP SERPL CALCULATED.3IONS-SCNC: 10.4 MMOL/L (ref 5–15)
BASOPHILS # BLD AUTO: 0.11 10*3/MM3 (ref 0–0.2)
BASOPHILS NFR BLD AUTO: 0.8 % (ref 0–1.5)
BUN SERPL-MCNC: 25 MG/DL (ref 6–20)
BUN/CREAT SERPL: 13.6 (ref 7–25)
CALCIUM SPEC-SCNC: 8.8 MG/DL (ref 8.6–10.5)
CHLORIDE SERPL-SCNC: 98 MMOL/L (ref 98–107)
CO2 SERPL-SCNC: 28.6 MMOL/L (ref 22–29)
CREAT SERPL-MCNC: 1.84 MG/DL (ref 0.76–1.27)
DEPRECATED RDW RBC AUTO: 45.4 FL (ref 37–54)
EGFRCR SERPLBLD CKD-EPI 2021: 41.7 ML/MIN/1.73
EOSINOPHIL # BLD AUTO: 0.47 10*3/MM3 (ref 0–0.4)
EOSINOPHIL NFR BLD AUTO: 3.2 % (ref 0.3–6.2)
ERYTHROCYTE [DISTWIDTH] IN BLOOD BY AUTOMATED COUNT: 14.1 % (ref 12.3–15.4)
GLUCOSE BLDC GLUCOMTR-MCNC: 117 MG/DL (ref 70–99)
GLUCOSE BLDC GLUCOMTR-MCNC: 135 MG/DL (ref 70–99)
GLUCOSE BLDC GLUCOMTR-MCNC: 146 MG/DL (ref 70–99)
GLUCOSE BLDC GLUCOMTR-MCNC: 77 MG/DL (ref 70–99)
GLUCOSE SERPL-MCNC: 79 MG/DL (ref 65–99)
HCT VFR BLD AUTO: 27.9 % (ref 37.5–51)
HGB BLD-MCNC: 8.7 G/DL (ref 13–17.7)
IMM GRANULOCYTES # BLD AUTO: 0.07 10*3/MM3 (ref 0–0.05)
IMM GRANULOCYTES NFR BLD AUTO: 0.5 % (ref 0–0.5)
LYMPHOCYTES # BLD AUTO: 2.16 10*3/MM3 (ref 0.7–3.1)
LYMPHOCYTES NFR BLD AUTO: 14.8 % (ref 19.6–45.3)
MAGNESIUM SERPL-MCNC: 1.9 MG/DL (ref 1.6–2.6)
MCH RBC QN AUTO: 28 PG (ref 26.6–33)
MCHC RBC AUTO-ENTMCNC: 31.2 G/DL (ref 31.5–35.7)
MCV RBC AUTO: 89.7 FL (ref 79–97)
MONOCYTES # BLD AUTO: 1.12 10*3/MM3 (ref 0.1–0.9)
MONOCYTES NFR BLD AUTO: 7.7 % (ref 5–12)
NEUTROPHILS NFR BLD AUTO: 10.65 10*3/MM3 (ref 1.7–7)
NEUTROPHILS NFR BLD AUTO: 73 % (ref 42.7–76)
NRBC BLD AUTO-RTO: 0 /100 WBC (ref 0–0.2)
PLATELET # BLD AUTO: 459 10*3/MM3 (ref 140–450)
PMV BLD AUTO: 8.5 FL (ref 6–12)
POTASSIUM SERPL-SCNC: 4 MMOL/L (ref 3.5–5.2)
QT INTERVAL: 376 MS
QTC INTERVAL: 474 MS
RBC # BLD AUTO: 3.11 10*6/MM3 (ref 4.14–5.8)
SODIUM SERPL-SCNC: 137 MMOL/L (ref 136–145)
WBC NRBC COR # BLD AUTO: 14.58 10*3/MM3 (ref 3.4–10.8)

## 2025-04-23 PROCEDURE — 25010000002 VANCOMYCIN 5 G RECONSTITUTED SOLUTION: Performed by: INTERNAL MEDICINE

## 2025-04-23 PROCEDURE — 82948 REAGENT STRIP/BLOOD GLUCOSE: CPT

## 2025-04-23 PROCEDURE — 63710000001 INSULIN GLARGINE PER 5 UNITS: Performed by: INTERNAL MEDICINE

## 2025-04-23 PROCEDURE — 36415 COLL VENOUS BLD VENIPUNCTURE: CPT | Performed by: INTERNAL MEDICINE

## 2025-04-23 PROCEDURE — 85025 COMPLETE CBC W/AUTO DIFF WBC: CPT | Performed by: INTERNAL MEDICINE

## 2025-04-23 PROCEDURE — 82948 REAGENT STRIP/BLOOD GLUCOSE: CPT | Performed by: INTERNAL MEDICINE

## 2025-04-23 PROCEDURE — 87481 CANDIDA DNA AMP PROBE: CPT | Performed by: INTERNAL MEDICINE

## 2025-04-23 PROCEDURE — 25010000002 FUROSEMIDE PER 20 MG: Performed by: INTERNAL MEDICINE

## 2025-04-23 PROCEDURE — 25010000002 PIPERACILLIN SOD-TAZOBACTAM PER 1 G: Performed by: INTERNAL MEDICINE

## 2025-04-23 PROCEDURE — 99233 SBSQ HOSP IP/OBS HIGH 50: CPT | Performed by: INTERNAL MEDICINE

## 2025-04-23 PROCEDURE — 25810000003 SODIUM CHLORIDE 0.9 % SOLUTION: Performed by: INTERNAL MEDICINE

## 2025-04-23 PROCEDURE — 80048 BASIC METABOLIC PNL TOTAL CA: CPT | Performed by: INTERNAL MEDICINE

## 2025-04-23 PROCEDURE — 83735 ASSAY OF MAGNESIUM: CPT | Performed by: INTERNAL MEDICINE

## 2025-04-23 RX ORDER — NITROGLYCERIN 0.4 MG/1
0.4 TABLET SUBLINGUAL
Status: DISCONTINUED | OUTPATIENT
Start: 2025-04-23 | End: 2025-04-25 | Stop reason: HOSPADM

## 2025-04-23 RX ADMIN — APIXABAN 5 MG: 5 TABLET, FILM COATED ORAL at 08:23

## 2025-04-23 RX ADMIN — DULOXETINE 30 MG: 30 CAPSULE, DELAYED RELEASE ORAL at 08:25

## 2025-04-23 RX ADMIN — FINASTERIDE 5 MG: 5 TABLET, FILM COATED ORAL at 08:25

## 2025-04-23 RX ADMIN — DILTIAZEM HYDROCHLORIDE 240 MG: 240 CAPSULE, COATED, EXTENDED RELEASE ORAL at 06:23

## 2025-04-23 RX ADMIN — FUROSEMIDE 40 MG: 10 INJECTION, SOLUTION INTRAMUSCULAR; INTRAVENOUS at 08:23

## 2025-04-23 RX ADMIN — ASPIRIN 81 MG: 81 TABLET, COATED ORAL at 08:23

## 2025-04-23 RX ADMIN — PIPERACILLIN AND TAZOBACTAM 4.5 G: 4; .5 INJECTION, POWDER, FOR SOLUTION INTRAVENOUS; PARENTERAL at 21:43

## 2025-04-23 RX ADMIN — APIXABAN 5 MG: 5 TABLET, FILM COATED ORAL at 21:43

## 2025-04-23 RX ADMIN — VANCOMYCIN HYDROCHLORIDE 1500 MG: 5 INJECTION, POWDER, LYOPHILIZED, FOR SOLUTION INTRAVENOUS at 08:25

## 2025-04-23 RX ADMIN — MONTELUKAST 10 MG: 10 TABLET, FILM COATED ORAL at 21:43

## 2025-04-23 RX ADMIN — PIPERACILLIN AND TAZOBACTAM 4.5 G: 4; .5 INJECTION, POWDER, FOR SOLUTION INTRAVENOUS; PARENTERAL at 06:23

## 2025-04-23 RX ADMIN — BUSPIRONE HYDROCHLORIDE 15 MG: 15 TABLET ORAL at 21:43

## 2025-04-23 RX ADMIN — Medication 10 ML: at 21:43

## 2025-04-23 RX ADMIN — Medication 473 ML: at 21:43

## 2025-04-23 RX ADMIN — PIPERACILLIN AND TAZOBACTAM 4.5 G: 4; .5 INJECTION, POWDER, FOR SOLUTION INTRAVENOUS; PARENTERAL at 14:30

## 2025-04-23 RX ADMIN — BUSPIRONE HYDROCHLORIDE 15 MG: 15 TABLET ORAL at 08:23

## 2025-04-23 RX ADMIN — TAMSULOSIN HYDROCHLORIDE 0.4 MG: 0.4 CAPSULE ORAL at 08:23

## 2025-04-23 RX ADMIN — ATORVASTATIN CALCIUM 20 MG: 20 TABLET, FILM COATED ORAL at 21:43

## 2025-04-23 RX ADMIN — FUROSEMIDE 40 MG: 10 INJECTION, SOLUTION INTRAMUSCULAR; INTRAVENOUS at 17:30

## 2025-04-23 RX ADMIN — COLLAGENASE SANTYL 1 APPLICATION: 250 OINTMENT TOPICAL at 21:43

## 2025-04-23 RX ADMIN — EMPAGLIFLOZIN 10 MG: 10 TABLET, FILM COATED ORAL at 08:23

## 2025-04-23 RX ADMIN — Medication 10 ML: at 08:25

## 2025-04-23 RX ADMIN — INSULIN GLARGINE 15 UNITS: 100 INJECTION, SOLUTION SUBCUTANEOUS at 21:43

## 2025-04-23 RX ADMIN — PANTOPRAZOLE SODIUM 40 MG: 40 TABLET, DELAYED RELEASE ORAL at 06:23

## 2025-04-23 NOTE — CONSULTS
"Nutrition Services    Patient Name: Preston Wallis  YOB: 1965  MRN: [id number]  Admission date: 4/22/2025      CLINICAL NUTRITION ASSESSMENT      Reason for Assessment   Pressure injury     H&P:  Past Medical History:   Diagnosis Date    1. Incision and drainage of posterior perianal abscess 2. Sharp excisional debridement through skin and subcutaneous tissue in the posterior perianal area, 9 x 6 x 1 cm 01/26/2025    Age-related cognitive decline     Allergic contact dermatitis     Allergies     Anemia     Asthma     Bedbound     11/2023 \"MY LEG MUSCLES STOPPED WORKING\"    Bronchiectasis with acute lower respiratory infection     Charcot foot due to diabetes mellitus 09/10/2013    Chronic diastolic (congestive) heart failure     Chronic kidney disease     Chronic respiratory failure with hypoxia     Closed supracondylar fracture of femur 01/12/2022    COPD (chronic obstructive pulmonary disease)     Deep vein thrombosis (DVT) of lower extremity associated with air travel 01/13/2023    Dependence on supplemental oxygen     Eczema     Elevated cholesterol     Erectile dysfunction     due to organic reasons    Essential (primary) hypertension     Fracture     closed fracture of other tarsal and metatarsal bones    Fracture of proximal humerus 01/13/2023    GERD without esophagitis     High risk medication use     Hypercholesteremia     Hypomagnesemia     Infected stasis ulcer of left lower extremity 01/13/2023    Insomnia     Low back pain     Major depressive disorder     Morbid (severe) obesity due to excess calories     MRSA pneumonia     Muscle weakness     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 without evidence of necrosis     Obstructive sleep apnea (adult) (pediatric)     On home O2     REPORTS WEARING 2L/NC AAT    Other forms of dyspnea     Other long term (current) drug therapy     Other specified noninfective gastroenteritis and colitis     Other " "spondylosis, lumbar region     Pain in both knees     Paroxysmal atrial fibrillation     Peripheral neuropathy     attributed to type 2 diabetes    Pneumonia, unspecified organism     Polyneuropathy     Rash and other nonspecific skin eruption     Self-catheterizes urinary bladder     EVERY 4 HOURS    Smoking     \"SOMETIMES\"    Syncope and collapse     Tachycardia     Tinnitus 01/13/2023    Type 1 diabetes mellitus with diabetic chronic kidney disease     Type 2 diabetes mellitus     Unspecified fall, initial encounter     Urinary retention         Current Problems:   Active Hospital Problems    Diagnosis     **Nausea and vomiting     Type 2 diabetes mellitus with diabetic peripheral angiopathy without gangrene, with long-term current use of insulin     Chronic obstructive pulmonary disease     Essential hypertension     Neurogenic bladder     Gastroesophageal reflux disease     Benign prostatic hyperplasia     Hyperlipidemia     Paroxysmal atrial fibrillation     Acute on chronic diastolic heart failure         Nutrition/Diet History         Narrative   Pt has coccyx pressure injury. Recommend Rosalino to promote wound healing.      Anthropometrics        Current Height, Weight Height: 175.3 cm (69\")  Weight: 107 kg (235 lb 7.2 oz)   Current BMI Body mass index is 34.77 kg/m².   BMI Classification Obese Class I   % %   Adjusted Body Weight (ABW)    Weight Hx  Wt Readings from Last 30 Encounters:   04/22/25 1204 107 kg (235 lb 7.2 oz)   04/17/25 1041 103 kg (227 lb)   03/31/25 1011 103 kg (227 lb)   03/16/25 1431 104 kg (228 lb 13.4 oz)   03/14/25 1100 103 kg (228 lb)   03/10/25 1132 134 kg (296 lb)   02/20/25 0608 135 kg (296 lb 11.8 oz)   02/19/25 1210 (!) 136 kg (300 lb 4.3 oz)   02/18/25 0427 (!) 136 kg (300 lb 11.3 oz)   02/15/25 0330 101 kg (222 lb 7.1 oz)   02/14/25 0536 102 kg (224 lb 13.9 oz)   02/13/25 0401 104 kg (230 lb 6.1 oz)   02/12/25 0215 106 kg (233 lb 7.5 oz)   02/11/25 0327 108 kg (238 lb 1.6 " oz)   02/10/25 0532 111 kg (244 lb 4.3 oz)   02/09/25 0300 111 kg (245 lb 6 oz)   02/08/25 0530 110 kg (243 lb 2.7 oz)   02/07/25 0700 109 kg (239 lb 3.2 oz)   02/06/25 0500 109 kg (239 lb 6.7 oz)   02/05/25 0500 107 kg (236 lb 15.9 oz)   02/04/25 0437 110 kg (241 lb 13.5 oz)   02/03/25 0500 112 kg (247 lb 2.2 oz)   02/02/25 0500 118 kg (260 lb 2.3 oz)   02/01/25 0500 120 kg (263 lb 14.3 oz)   01/31/25 0500 121 kg (265 lb 10.5 oz)   01/30/25 0500 120 kg (263 lb 10.7 oz)   01/29/25 0500 123 kg (271 lb 2.7 oz)   01/27/25 0500 118 kg (260 lb 2.3 oz)   01/26/25 0308 113 kg (248 lb 14.4 oz)   01/25/25 0429 111 kg (245 lb 2.4 oz)   01/24/25 0924 111 kg (244 lb 0.8 oz)   01/23/25 1555 123 kg (270 lb 4.5 oz)   12/12/24 1050 109 kg (241 lb)   12/11/24 1048 109 kg (241 lb)   12/05/24 1155 111 kg (244 lb)   10/30/24 1004 111 kg (245 lb)   08/22/24 1439 109 kg (241 lb)   08/15/24 1325 112 kg (247 lb 9.2 oz)   08/12/24 1238 126 kg (278 lb)   07/18/24 1326 126 kg (278 lb)   05/20/24 1030 119 kg (263 lb)   05/17/24 1151 119 kg (263 lb)   05/17/24 1028 119 kg (263 lb)   05/03/24 0432 125 kg (275 lb 5.7 oz)   05/01/24 0438 124 kg (272 lb 14.9 oz)   04/30/24 0600 127 kg (279 lb 5.2 oz)   04/29/24 2114 126 kg (277 lb 9 oz)   04/29/24 0241 116 kg (255 lb 11.7 oz)   04/19/24 0614 116 kg (255 lb 1.2 oz)   04/18/24 0617 119 kg (262 lb 2 oz)   04/17/24 0616 115 kg (252 lb 13.9 oz)   04/16/24 0519 124 kg (272 lb 11.3 oz)   04/15/24 0617 124 kg (272 lb 14.9 oz)   04/15/24 0057 124 kg (273 lb 9.5 oz)   04/15/24 0050 124 kg (273 lb 9.5 oz)   04/14/24 1908 127 kg (279 lb 1.6 oz)   04/04/24 1042 122 kg (270 lb)   02/16/24 1458 126 kg (276 lb 10.8 oz)   01/30/24 1032 128 kg (282 lb)   01/11/24 1556 123 kg (272 lb)   12/15/23 0330 127 kg (280 lb)   12/14/23 1400 126 kg (277 lb)   12/06/23 1904 126 kg (277 lb 12.5 oz)   11/20/23 0953 126 kg (278 lb)   11/17/23 1019 126 kg (278 lb)   11/06/23 1119 126 kg (278 lb)   10/11/23 1020 126 kg (278 lb)           Wt Change Observation 4% wt loss x 5 months. Not significant. Wt jump most likely due to fluid or an outlier.      Estimated/Assessed Needs  Estimated Needs based on: Current Body Weight       Energy Requirements 25-30 kcal/kg   EST Needs (kcal/day) 0143-1447 kcal       Protein Requirements 0.8 g/kg    EST Daily Needs (g/day) 85 g pro       Fluid Requirements Fluid Restriction    Estimated Needs (mL/day) 240 ml/tray     Labs/Medications         Pertinent Labs Reviewed.   Results from last 7 days   Lab Units 04/23/25  0534 04/22/25  1212 04/17/25  1245   SODIUM mmol/L 137 137 136   POTASSIUM mmol/L 4.0 4.3 4.1   CHLORIDE mmol/L 98 97* 97*   CO2 mmol/L 28.6 31.2* 25.9   BUN mg/dL 25* 26* 29*   CREATININE mg/dL 1.84* 1.79* 1.94*   CALCIUM mg/dL 8.8 9.9 9.0   BILIRUBIN mg/dL  --  0.2  --    ALK PHOS U/L  --  282*  --    ALT (SGPT) U/L  --  53*  --    AST (SGOT) U/L  --  72*  --    GLUCOSE mg/dL 79 139* 252*     Results from last 7 days   Lab Units 04/23/25  0534 04/22/25  1212   MAGNESIUM mg/dL 1.9 2.0   HEMOGLOBIN g/dL 8.7* 9.6*   HEMATOCRIT % 27.9* 31.4*     COVID19   Date Value Ref Range Status   01/23/2025 Not Detected Not Detected - Ref. Range Final     Lab Results   Component Value Date    HGBA1C 7.0 (A) 03/14/2025         Pertinent Medications Reviewed.     Malnutrition Severity Assessment              Nutrition Diagnosis         Nutrition Dx Problem 1 Increased nutrient needs related to  wound healing  as evidenced by impaired skin integrity.     Nutrition Intervention           Current Nutrition Orders & Evaluation of Intake       Current PO Diet Diet: Cardiac, Diabetic, Liquid; Clear Liquid; Low Sodium (2g); Consistent Carbohydrate; Fluid Consistency: Thin (IDDSI 0)   Supplement No active supplement orders           Nutrition Intervention/Prescription        Rosalino BID. Provides 190 kcal. 5 g pro.         Medical Nutrition Therapy/Nutrition Education          Learner     Readiness N/A  N/A     Method      Response N/A  N/A     Monitor/Evaluation        Monitor Per protocol, Supplement intake, Weight, Skin status, POC/GOC     Nutrition Discharge Plan         No nutrition discharge needs identified at this time     Electronically signed by:  Joi Treviño  04/23/25 09:37 EDT

## 2025-04-23 NOTE — PLAN OF CARE
Goal Outcome Evaluation:  Plan of Care Reviewed With: patient        Progress: no change  Outcome Evaluation: AOx4, bedbound. Wound care to left foot complete by wound care RN. Fluid restriction initiated today 2000ml/day.No c/o pain. Right port is unaccessed. Colostomy assessed today. Peribottle in use for proper emptying of bag at 1/3 full, see nursing communication. No new concerns this shift, continue plan of care.

## 2025-04-23 NOTE — PLAN OF CARE
Goal Outcome Evaluation:  Plan of Care Reviewed With: patient has been getting scheduled antibiotics on this shift, Pt has no C/O on this shift, plan of care on going.

## 2025-04-23 NOTE — PROGRESS NOTES
"[facility] Clinical Pharmacy Services: Vancomycin Monitoring Note    Preston Wallis is a 59 y.o. male who is on day  of pharmacy to dose vancomycin for Bacteremia and Intra-Abdominal Infection.    Previous Vancomycin Dose:   1500 mg IV every  24  hours  Imaging Reviewed?: Yes  Updated Cultures and Sensitivities:   Cx pending    Vitals/Labs  Ht: 175.3 cm (69\"); Wt: 107 kg (235 lb 7.2 oz)   Temp (24hrs), Av.9 °F (36.6 °C), Min:97.5 °F (36.4 °C), Max:98.2 °F (36.8 °C)   Estimated Creatinine Clearance: 52.1 mL/min (A) (by C-G formula based on SCr of 1.84 mg/dL (H)).       Results from last 7 days   Lab Units 25  0534 25  1212 25  1245   CREATININE mg/dL 1.84* 1.79* 1.94*   WBC 10*3/mm3 14.58* 15.19* 15.60*     Assessment/Plan    Current Vancomycin Dose:  1500 mg IV every 24 hours; which provides the following predicted parameters:  AUC24,ss: 543 mg/L.hr  Probability of AUC24 > 400: 80 %  Ctrough,ss: 17 mg/L  Probability of Ctrough,ss > 20: 36 %  Probability of nephrotoxicity (Lodise BRADEN ): 13 %  Next Vanc Random ordered for  at 0600.   We will continue to monitor patient changes and renal function     Thank you for involving pharmacy in this patient's care. Please contact pharmacy with any questions or concerns.    Linda Adrian [facility]  Clinical Pharmacist    "

## 2025-04-23 NOTE — SIGNIFICANT NOTE
" Wound Eval / Progress Noted    YAIMA [place]ton     Patient Name: Preston Wallis  : 1965  MRN: [id number]  Today's Date: 2025                 Admit Date: 2025    Visit Dx:    ICD-10-CM ICD-9-CM   1. Nausea and vomiting, unspecified vomiting type  R11.2 787.01   2. Leukocytosis, unspecified type  D72.829 288.60   3. SIRS (systemic inflammatory response syndrome)  R65.10 995.90         Acute on chronic diastolic heart failure    Paroxysmal atrial fibrillation    Benign prostatic hyperplasia    Gastroesophageal reflux disease    Hyperlipidemia    Neurogenic bladder    Chronic obstructive pulmonary disease    Essential hypertension    Type 2 diabetes mellitus with diabetic peripheral angiopathy without gangrene, with long-term current use of insulin    Nausea and vomiting        Past Medical History:   Diagnosis Date    1. Incision and drainage of posterior perianal abscess 2. Sharp excisional debridement through skin and subcutaneous tissue in the posterior perianal area, 9 x 6 x 1 cm 2025    Age-related cognitive decline     Allergic contact dermatitis     Allergies     Anemia     Asthma     Bedbound     2023 \"MY LEG MUSCLES STOPPED WORKING\"    Bronchiectasis with acute lower respiratory infection     Charcot foot due to diabetes mellitus 09/10/2013    Chronic diastolic (congestive) heart failure     Chronic kidney disease     Chronic respiratory failure with hypoxia     Closed supracondylar fracture of femur 2022    COPD (chronic obstructive pulmonary disease)     Deep vein thrombosis (DVT) of lower extremity associated with air travel 2023    Dependence on supplemental oxygen     Eczema     Elevated cholesterol     Erectile dysfunction     due to organic reasons    Essential (primary) hypertension     Fracture     closed fracture of other tarsal and metatarsal bones    Fracture of proximal humerus 2023    GERD without esophagitis     High risk medication use     " "Hypercholesteremia     Hypomagnesemia     Infected stasis ulcer of left lower extremity 01/13/2023    Insomnia     Low back pain     Major depressive disorder     Morbid (severe) obesity due to excess calories     MRSA pneumonia     Muscle weakness     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 without evidence of necrosis     Obstructive sleep apnea (adult) (pediatric)     On home O2     REPORTS WEARING 2L/NC AAT    Other forms of dyspnea     Other long term (current) drug therapy     Other specified noninfective gastroenteritis and colitis     Other spondylosis, lumbar region     Pain in both knees     Paroxysmal atrial fibrillation     Peripheral neuropathy     attributed to type 2 diabetes    Pneumonia, unspecified organism     Polyneuropathy     Rash and other nonspecific skin eruption     Self-catheterizes urinary bladder     EVERY 4 HOURS    Smoking     \"SOMETIMES\"    Syncope and collapse     Tachycardia     Tinnitus 01/13/2023    Type 1 diabetes mellitus with diabetic chronic kidney disease     Type 2 diabetes mellitus     Unspecified fall, initial encounter     Urinary retention         Past Surgical History:   Procedure Laterality Date    BRONCHOSCOPY N/A 1/28/2025    Procedure: BRONCHOSCOPY: BAL: insertion of lighted instrument to view inside the lung;  Surgeon: Walter Nicole DO;  Location: [facility] MAIN OR;  Service: Pulmonary;  Laterality: N/A;    BRONCHOSCOPY N/A 2/3/2025    Procedure: BRONCHOSCOPY WITH BRONCHOALVEOLAR LAVAGE, POSSIBLE BIOPSY, BRUSHING, WASHING, AIRWAY INSPECTION: insertion of lighted instrument to view inside the lung;  Surgeon: Chadd Swan MD;  Location: [facility] MAIN OR;  Service: Pulmonary;  Laterality: N/A;    CARDIAC CATHETERIZATION Left 8/15/2024    Procedure: Carbon dioxide aortogram with left leg angiogram, possible angioplasty or stenting;  Surgeon: Moshe Willson MD;  Location: [facility] CATH INVASIVE LOCATION;  Service: " Vascular;  Laterality: Left;    CHOLECYSTECTOMY      COLOSTOMY N/A 2/6/2025    Procedure: COLOSTOMY LAPAROSCOPIC; plain text: creation of colostomy using small incisions;  Surgeon: Emerson Canada MD;  [place] [facility] [place] [facility] [place]  Service: General;  Laterality: N/A;    CYSTOSCOPY      ENDOSCOPY N/A 3/18/2025    Procedure: ESOPHAGOGASTRODUODENOSCOPY WITH BIOPSIES;  Surgeon: Rafael Hernandez MD;  Location: [facility] ENDOSCOPY;  Service: Gastroenterology;  Laterality: N/A;  HIATAL HERNIA, REFLUX ESOPHAGITIS    FEMUR SURGERY Left     Shravan placed    INCISION AND DRAINAGE ABSCESS N/A 1/26/2025    Procedure: INCISION AND DRAINAGE ABSCESS; plain text: incision and drain pus from buttocks wound;  Surgeon: Emerson Canada MD;  [place] [facility] [place] [facility] [place]  Service: General;  Laterality: N/A;    KNEE SURGERY Left     OTHER SURGICAL HISTORY Left     venous port, REMOVED    PORTACATH PLACEMENT Right     TIBIAL PLATEAU OPEN REDUCTION INTERNAL FIXATION Left 12/22/2023    Procedure: TIBIAL PLATEAU OPEN REDUCTION INTERNAL FIXATION;  Surgeon: Hugo Kline MD;  Location: [facility];  Service: Orthopedics;  Laterality: Left;    TONSILLECTOMY AND ADENOIDECTOMY           Physical Assessment:  Wound 02/16/24 1700 Left posterior foot Pressure Injury (Active)   Wound Image   04/22/25 1827   Pressure Injury Stage 3 04/23/25 1158   Dressing Appearance dry;intact 04/23/25 1158   Closure None 04/23/25 1158   Base moist;red;slough;yellow 04/23/25 1158   Periwound dry;intact 04/23/25 1158   Periwound Temperature warm 04/23/25 1158   Periwound Skin Turgor soft 04/23/25 1158   Edges open 04/23/25 1158   Wound Length (cm) 3.4 cm 04/23/25 1158   Wound Width (cm) 3.1 cm 04/23/25 1158   Wound Depth (cm) 0.2 cm 04/23/25 1158   Wound Surface Area (cm^2) 8.28 cm^2 04/23/25 1158   Wound Volume (cm^3) 1.104 cm^3 04/23/25 1158   Drainage Characteristics/Odor serosanguineous;yellow 04/23/25 1158   Drainage Amount small 04/23/25 1158   Care, Wound  "cleansed with;irrigated with;sterile normal saline 04/23/25 1158   Dressing Care dressing applied;dressing moistened;sodium chloride impregnated;gauze, wet-to-moist;silicone border foam 04/23/25 1158   Periwound Care absorptive dressing applied 04/23/25 1158       Wound 01/23/25 2330 coccyx pressure injury (Active)   Wound Image   04/22/25 1832   Dressing Appearance dry;intact 04/23/25 1158   Closure None 04/23/25 1158   Base moist;red 04/23/25 1158   Periwound dry;intact 04/23/25 1158   Periwound Temperature warm 04/23/25 1158   Periwound Skin Turgor soft 04/23/25 1158   Edges open 04/23/25 1158   Wound Length (cm) 2.4 cm 04/23/25 1158   Wound Width (cm) 2.8 cm 04/23/25 1158   Wound Depth (cm) 0.1 cm 04/23/25 1158   Wound Surface Area (cm^2) 5.28 cm^2 04/23/25 1158   Wound Volume (cm^3) 0.352 cm^3 04/23/25 1158   Drainage Characteristics/Odor serosanguineous 04/23/25 1158   Drainage Amount small 04/23/25 1158   Care, Wound cleansed with;sterile normal saline 04/23/25 1158   Dressing Care dressing applied;hydrofiber;silver impregnated;silicone border foam 04/23/25 1158   Periwound Care absorptive dressing applied 04/23/25 1158      Colostomy LLQ   Placement date: If unknown, DO NOT use \"Add Comment\" note/Placement time: If unknown, DO NOT use \"Add Comment\" note: 02/06/25 1322   Hand Hygiene Completed: Yes  Location: LLQ   Stomal Appliance 1 piece;Clean;Dry;Intact;Drainable   Stoma Appearance other (see comments)  (unable to visualize)   Peristomal Assessment AMINA   Stoma Function stool   Stool Color brown   Stool Consistency formed;soft     Wound Check / Follow-up: Patient seen today for wound consult.  Patient is awake, alert and oriented at time of visit.  He is agreeable to assessment.  Patient's spouse is present at bedside.    Patient with existing colostomy to left abdomen.  Large amount of soft, formed stool is noted within pouching system.  Pouching system emptied.  Francisca bottle ordered for staff to use to " place water within pouching system to aid in cleansing.  Discussed with patient and spouse.  Patient and spouse both verbalized understanding.    Patient with chronic pressure injury to left heel, presenting as stage III today.  Moist, red tissue is present to wound base along with yellow sloughing tissue.  There is evidence of biofilm within wound base as well.  Periwound tissue is dry and intact.  Cleansed and irrigated wound with normal saline.  Recommending twice daily dressing changes with collagenase (Santyl) ointment and 1/8 strength Dakin's moistened fluffed gauze, secured with silicone border dressing.  Discussed treatment plan with attending physician.  MD agrees with treatment plan.    Wound to gluteal / perirectal area showing much improvement.  Patient's spouse at bedside reports that this wound started as an abscess in January and patient required surgical intervention for drainage of abscess.  Moist, red tissue is present to wound base.  Minimal depth remains.  Periwound tissue is dry and intact.  Cleansed and irrigated wound with normal saline.  Applied a silver impregnated Hydrofiber then secured with silicone border dressing.  Recommending daily dressing changes.    Recommending to implement pressure reduction measures including every 2 hour turns and offloading heels at all times.      Impression: Existing colostomy.  Chronic pressure injury to left heel.  Improving wound to gluteal / perirectal area.      Short term goals: Regain skin integrity, skin protection, pressure reduction, ostomy support, twice daily dressing changes, daily dressing changes.      Jolly Coronado RN    4/23/2025    15:37 EDT

## 2025-04-23 NOTE — PROGRESS NOTES
[facility]   Progress Note    Patient Name: Preston Wallis  : 1965  MRN: [id number]  Primary Care Physician:  Kimmy Riley MD  Date of admission: 2025    Subjective   Subjective     I have examined this patient at bedside this morning. They report feeling well. No acute events overnight. Patient is having bowel movements and is producing urine in his lopez. Patient is tolerating clear liquid diet. I discussed the case with the patient's RN.      Review of Systems   Constitutional:  Negative for chills, diaphoresis, fatigue and fever.   HENT:  Negative for facial swelling, rhinorrhea, sinus pressure, sore throat, trouble swallowing and voice change.    Eyes:  Negative for photophobia, redness and visual disturbance.   Respiratory:  Negative for cough, shortness of breath and wheezing.    Cardiovascular:  Negative for chest pain, palpitations and leg swelling.   Gastrointestinal:  Negative for abdominal pain, anal bleeding, blood in stool, constipation, diarrhea, nausea and vomiting.   Endocrine: Negative for polyuria.   Genitourinary:  Negative for difficulty urinating, dysuria, flank pain, frequency, hematuria and urgency.   Musculoskeletal:  Negative for arthralgias, back pain, gait problem, joint swelling, myalgias, neck pain and neck stiffness.   Skin:  Positive for wound (L heel and sacrum). Negative for rash.   Allergic/Immunologic: Negative for immunocompromised state.   Neurological:  Negative for dizziness, tremors, seizures, syncope, facial asymmetry, speech difficulty, weakness, light-headedness, numbness and headaches.   Hematological:  Does not bruise/bleed easily.   Psychiatric/Behavioral:  Negative for agitation, confusion and hallucinations. The patient is not nervous/anxious.        Objective   Objective     Vitals:   Temp:  [97.5 °F (36.4 °C)-98.2 °F (36.8 °C)] 98.1 °F (36.7 °C)  Heart Rate:  [] 91  Resp:  [16-20] 16  BP: (164-184)/(67-87) 164/71  Flow (L/min) (Oxygen  Therapy):  [2] 2    Physical Exam  Constitutional:       General: He is not in acute distress.     Appearance: Normal appearance. He is obese. He is not ill-appearing, toxic-appearing or diaphoretic.   HENT:      Head: Normocephalic and atraumatic.      Mouth/Throat:      Mouth: Mucous membranes are moist.      Pharynx: Oropharynx is clear.   Eyes:      General: No scleral icterus.        Right eye: No discharge.         Left eye: No discharge.      Extraocular Movements: Extraocular movements intact.      Conjunctiva/sclera: Conjunctivae normal.   Cardiovascular:      Rate and Rhythm: Normal rate and regular rhythm.      Heart sounds: Normal heart sounds. No murmur heard.  Pulmonary:      Effort: Pulmonary effort is normal. No respiratory distress.      Breath sounds: No stridor. Rhonchi present. No wheezing or rales.   Abdominal:      General: Abdomen is flat. Bowel sounds are normal. There is no distension.      Tenderness: There is no abdominal tenderness. There is no guarding.   Musculoskeletal:         General: No tenderness or signs of injury.      Cervical back: No tenderness.      Right lower leg: Edema (improving) present.      Left lower leg: Edema (improving) present.   Skin:     General: Skin is warm and dry.      Coloration: Skin is not jaundiced or pale.      Comments: Decubitus ulcer on sacrum and L heel covered   Neurological:      General: No focal deficit present.      Mental Status: He is alert and oriented to person, place, and time. Mental status is at baseline.      Cranial Nerves: No cranial nerve deficit.      Motor: No weakness.   Psychiatric:         Mood and Affect: Mood normal.         Thought Content: Thought content normal.          Result Review    Result Review:  I have personally reviewed the results from the time of this admission to 4/23/2025 10:36 EDT and agree with these findings:  [x]  Laboratory list / accordion  [x]  Microbiology  [x]  Radiology  [x]  EKG/Telemetry   [x]   Cardiology/Vascular   []  Pathology  []  Old records  []  Other:        Assessment & Plan   Assessment / Plan     Brief Patient Summary:  Preston Wallis is a 59 y.o. male who is presenting for nausea and vomiting.  He was found to have a leukocytosis and is being admitted for further evaluation of infection given his prior history of MDR and ESBL infections.     Active Hospital Problems:  Active Hospital Problems    Diagnosis     **Nausea and vomiting     Type 2 diabetes mellitus with diabetic peripheral angiopathy without gangrene, with long-term current use of insulin     Chronic obstructive pulmonary disease     Essential hypertension     Neurogenic bladder     Gastroesophageal reflux disease     Benign prostatic hyperplasia     Hyperlipidemia     Paroxysmal atrial fibrillation     Acute on chronic diastolic heart failure           Acute exacerbation of HFmrEF  -EF: 49%  -2g salt restriction, 2L fluid restriction  -Strict I/Os, daily weights  -Telemetry ordered  -Diurese with: lasix 40 iv bid   -Net I/O: -655     Nausea and vomiting - resolved  -Abdominal exam rather unremarkable  -CT cap unrevealing of acute pathology  -UA unremarkable  -possibly gastritis  -prn zofran  -vanc, Zosyn   -f/u blood cx  -Diet advanced to regular today    Anemia of renal disease  -Trend hgb  -transfuse as needed     Type 2 Diabetes mellitus  -Insulin dependent   -A1c: 7.10  -Blood glucose mostly controlled  -BG checks q4h   -Low  dose SSI  -Long acting: 15u qhs     CKD3b  -renal function near baseline  -renal dose meds, avoid nephrotoxins  -Cont home farxiga     Afib  -C2V: 4  -Telemetry ordered  -Currently rate controlled   -Continue home: cardizem  -Anticoag: eliquis     Hypertension  -PRN labetalol     COPD  -on 2L home o2  -no ae  -prn duoneb     Decubitus ulcers  -Stage 3 on sacrum, stage 1 on L heel  -wound care consult  -Rosalino added per nutrition's recs     Ostomy in place  -ostomy RN consulted     HLD  -Cont home asa, lipitor      Anxiety/depression  -Cont home buspar bid, duloxetine     GERD  -Cont home pepcid, protonix     BPH  -Cont home flomax, proscar        VTE ppx: eliquis    Stress Ulcer ppx: home protonix     Estimated Medical Readiness Discharge Date:  04/25/25     Anticipated Disposition: home     Discharge Milestones:  blood cx results        CODE STATUS:    Code Status (Patient has no pulse and is not breathing): CPR (Attempt to Resuscitate)  Medical Interventions (Patient has pulse or is breathing): Full Support  Level Of Support Discussed With: Patient      Ian Aquino MD

## 2025-04-24 ENCOUNTER — TELEPHONE (OUTPATIENT)
Dept: FAMILY MEDICINE CLINIC | Age: 60
End: 2025-04-24

## 2025-04-24 ENCOUNTER — READMISSION MANAGEMENT (OUTPATIENT)
Dept: CALL CENTER | Facility: HOSPITAL | Age: 60
End: 2025-04-24
Payer: MEDICAID

## 2025-04-24 VITALS
HEART RATE: 74 BPM | DIASTOLIC BLOOD PRESSURE: 64 MMHG | TEMPERATURE: 98.2 F | HEIGHT: 69 IN | RESPIRATION RATE: 18 BRPM | SYSTOLIC BLOOD PRESSURE: 144 MMHG | OXYGEN SATURATION: 96 % | WEIGHT: 230.38 LBS | BODY MASS INDEX: 34.12 KG/M2

## 2025-04-24 PROBLEM — I50.33 ACUTE ON CHRONIC DIASTOLIC HEART FAILURE: Status: RESOLVED | Noted: 2022-02-01 | Resolved: 2025-04-24

## 2025-04-24 LAB
ALBUMIN SERPL-MCNC: 2.5 G/DL (ref 3.5–5.2)
ALBUMIN/GLOB SERPL: 0.6 G/DL
ALP SERPL-CCNC: 224 U/L (ref 39–117)
ALT SERPL W P-5'-P-CCNC: 39 U/L (ref 1–41)
ANION GAP SERPL CALCULATED.3IONS-SCNC: 12.3 MMOL/L (ref 5–15)
AST SERPL-CCNC: 51 U/L (ref 1–40)
BILIRUB SERPL-MCNC: 0.3 MG/DL (ref 0–1.2)
BUN SERPL-MCNC: 22 MG/DL (ref 6–20)
BUN/CREAT SERPL: 10.8 (ref 7–25)
CALCIUM SPEC-SCNC: 8.5 MG/DL (ref 8.6–10.5)
CHLORIDE SERPL-SCNC: 95 MMOL/L (ref 98–107)
CO2 SERPL-SCNC: 28.7 MMOL/L (ref 22–29)
CREAT SERPL-MCNC: 2.03 MG/DL (ref 0.76–1.27)
DEPRECATED RDW RBC AUTO: 45.7 FL (ref 37–54)
EGFRCR SERPLBLD CKD-EPI 2021: 37.1 ML/MIN/1.73
ERYTHROCYTE [DISTWIDTH] IN BLOOD BY AUTOMATED COUNT: 14.1 % (ref 12.3–15.4)
GLOBULIN UR ELPH-MCNC: 4.4 GM/DL
GLUCOSE BLDC GLUCOMTR-MCNC: 110 MG/DL (ref 70–99)
GLUCOSE BLDC GLUCOMTR-MCNC: 120 MG/DL (ref 70–99)
GLUCOSE BLDC GLUCOMTR-MCNC: 213 MG/DL (ref 70–99)
GLUCOSE SERPL-MCNC: 125 MG/DL (ref 65–99)
HCT VFR BLD AUTO: 27.2 % (ref 37.5–51)
HGB BLD-MCNC: 8.4 G/DL (ref 13–17.7)
MCH RBC QN AUTO: 27.6 PG (ref 26.6–33)
MCHC RBC AUTO-ENTMCNC: 30.9 G/DL (ref 31.5–35.7)
MCV RBC AUTO: 89.5 FL (ref 79–97)
PLATELET # BLD AUTO: 409 10*3/MM3 (ref 140–450)
PMV BLD AUTO: 8.6 FL (ref 6–12)
POTASSIUM SERPL-SCNC: 3.4 MMOL/L (ref 3.5–5.2)
PROT SERPL-MCNC: 6.9 G/DL (ref 6–8.5)
RBC # BLD AUTO: 3.04 10*6/MM3 (ref 4.14–5.8)
SODIUM SERPL-SCNC: 136 MMOL/L (ref 136–145)
VANCOMYCIN SERPL-MCNC: 31.73 MCG/ML (ref 5–40)
WBC NRBC COR # BLD AUTO: 14.15 10*3/MM3 (ref 3.4–10.8)

## 2025-04-24 PROCEDURE — 97161 PT EVAL LOW COMPLEX 20 MIN: CPT

## 2025-04-24 PROCEDURE — 80202 ASSAY OF VANCOMYCIN: CPT | Performed by: INTERNAL MEDICINE

## 2025-04-24 PROCEDURE — 85027 COMPLETE CBC AUTOMATED: CPT | Performed by: INTERNAL MEDICINE

## 2025-04-24 PROCEDURE — 63710000001 INSULIN LISPRO (HUMAN) PER 5 UNITS: Performed by: INTERNAL MEDICINE

## 2025-04-24 PROCEDURE — 80053 COMPREHEN METABOLIC PANEL: CPT | Performed by: INTERNAL MEDICINE

## 2025-04-24 PROCEDURE — 97110 THERAPEUTIC EXERCISES: CPT

## 2025-04-24 PROCEDURE — 97165 OT EVAL LOW COMPLEX 30 MIN: CPT

## 2025-04-24 PROCEDURE — 82948 REAGENT STRIP/BLOOD GLUCOSE: CPT

## 2025-04-24 PROCEDURE — 99238 HOSP IP/OBS DSCHRG MGMT 30/<: CPT | Performed by: INTERNAL MEDICINE

## 2025-04-24 PROCEDURE — 25010000002 PIPERACILLIN SOD-TAZOBACTAM PER 1 G: Performed by: INTERNAL MEDICINE

## 2025-04-24 RX ORDER — CIPROFLOXACIN 500 MG/1
500 TABLET, FILM COATED ORAL 2 TIMES DAILY
Qty: 10 TABLET | Refills: 0 | Status: SHIPPED | OUTPATIENT
Start: 2025-04-24

## 2025-04-24 RX ORDER — CIPROFLOXACIN 500 MG/1
500 TABLET, FILM COATED ORAL 2 TIMES DAILY
Qty: 10 TABLET | Refills: 0 | Status: SHIPPED | OUTPATIENT
Start: 2025-04-24 | End: 2025-04-24

## 2025-04-24 RX ORDER — METRONIDAZOLE 500 MG/1
500 TABLET ORAL 3 TIMES DAILY
Qty: 15 TABLET | Refills: 0 | Status: SHIPPED | OUTPATIENT
Start: 2025-04-24 | End: 2025-04-24

## 2025-04-24 RX ORDER — METRONIDAZOLE 500 MG/1
500 TABLET ORAL 3 TIMES DAILY
Qty: 15 TABLET | Refills: 0 | Status: SHIPPED | OUTPATIENT
Start: 2025-04-24

## 2025-04-24 RX ADMIN — COLLAGENASE SANTYL 1 APPLICATION: 250 OINTMENT TOPICAL at 14:45

## 2025-04-24 RX ADMIN — EMPAGLIFLOZIN 10 MG: 10 TABLET, FILM COATED ORAL at 09:02

## 2025-04-24 RX ADMIN — ASPIRIN 81 MG: 81 TABLET, COATED ORAL at 09:02

## 2025-04-24 RX ADMIN — BUSPIRONE HYDROCHLORIDE 15 MG: 15 TABLET ORAL at 09:02

## 2025-04-24 RX ADMIN — APIXABAN 5 MG: 5 TABLET, FILM COATED ORAL at 09:02

## 2025-04-24 RX ADMIN — PIPERACILLIN AND TAZOBACTAM 4.5 G: 4; .5 INJECTION, POWDER, FOR SOLUTION INTRAVENOUS; PARENTERAL at 14:45

## 2025-04-24 RX ADMIN — FINASTERIDE 5 MG: 5 TABLET, FILM COATED ORAL at 09:02

## 2025-04-24 RX ADMIN — DILTIAZEM HYDROCHLORIDE 240 MG: 240 CAPSULE, COATED, EXTENDED RELEASE ORAL at 06:31

## 2025-04-24 RX ADMIN — INSULIN LISPRO 3 UNITS: 100 INJECTION, SOLUTION INTRAVENOUS; SUBCUTANEOUS at 12:12

## 2025-04-24 RX ADMIN — Medication 10 ML: at 09:03

## 2025-04-24 RX ADMIN — PIPERACILLIN AND TAZOBACTAM 4.5 G: 4; .5 INJECTION, POWDER, FOR SOLUTION INTRAVENOUS; PARENTERAL at 06:31

## 2025-04-24 RX ADMIN — PANTOPRAZOLE SODIUM 40 MG: 40 TABLET, DELAYED RELEASE ORAL at 06:31

## 2025-04-24 RX ADMIN — DULOXETINE 30 MG: 30 CAPSULE, DELAYED RELEASE ORAL at 09:02

## 2025-04-24 RX ADMIN — TAMSULOSIN HYDROCHLORIDE 0.4 MG: 0.4 CAPSULE ORAL at 09:02

## 2025-04-24 RX ADMIN — Medication 473 ML: at 14:45

## 2025-04-24 NOTE — PLAN OF CARE
Goal Outcome Evaluation:  Plan of Care Reviewed With: patient        Progress: no change  Outcome Evaluation: Patient presents with deficits in balance, endurance, strength, and transfers. Patient will benefit from skilled PT services to address these mobility deficits and decrease risk of falls.    Anticipated Discharge Disposition (PT): home with home health, home with assist

## 2025-04-24 NOTE — PLAN OF CARE
Goal Outcome Evaluation:  Plan of Care Reviewed With: patient        Progress: no change  Outcome Evaluation: VSS, no c/.o pain, nausea/vomiting. MD has cleared patient to go home.

## 2025-04-24 NOTE — DISCHARGE SUMMARY
[facility]         HOSPITALIST  DISCHARGE SUMMARY    Patient Name: Preston Wallis  : 1965  MRN: [id number]    Date of Admission: 2025  Date of Discharge: 2025  Primary Care Physician: Kimmy Riley MD    Consults       Date and Time Order Name Status Description    2025  2:40 PM Hospitalist (on-call MD unless specified)              Active and Resolved Hospital Problems:  Active Hospital Problems    Diagnosis POA    Nausea and vomiting [R11.2] Yes    Type 2 diabetes mellitus with diabetic peripheral angiopathy without gangrene, with long-term current use of insulin [E11.51, Z79.4] Not Applicable    Chronic obstructive pulmonary disease [J44.9] Yes    Essential hypertension [I10] Yes    Neurogenic bladder [N31.9] Yes    Gastroesophageal reflux disease [K21.9] Yes    Benign prostatic hyperplasia [N40.0] Yes    Hyperlipidemia [E78.5] Yes    Paroxysmal atrial fibrillation [I48.0] Yes      Resolved Hospital Problems    Diagnosis POA    **Acute on chronic diastolic heart failure [I50.33] Yes       Hospital Course     Hospital Course:  Preston Wallis is a 59 y.o. male who was admitted to our facility for nausea and vomiting.  Serology revealed a leukocytosis of 15,000.  His CT CAP was unrevealing of any acute pathology and his urinalysis was unremarkable for any acute infection.  The patient has a history of MDR infections and was thus admitted for further evaluation and care.  Given his GI symptoms, he was suspected that he has a intra-abdominal source of infection.  He was given vancomycin and Zosyn and his leukocytosis has been steadily improving.  He reported significant improvement in his symptoms.  His blood cultures have been negative at time of discharge.  He was discharged with prescription for ciprofloxacin and Flagyl to complete a 7-day course of the biotics.  The patient was also found to be in and acute CHF exacerbation.  He was treated with Lasix 40 mg IV twice  daily and is net I/O was -6 L by time of discharge.  He was instructed to follow-up with his primary care physician within the next 1 week.        DISCHARGE Follow Up Recommendations for labs and diagnostics: Repeat CBC      Day of Discharge     Vital Signs:  Temp:  [98.2 °F (36.8 °C)-98.6 °F (37 °C)] 98.2 °F (36.8 °C)  Heart Rate:  [68-81] 68  Resp:  [18-20] 18  BP: (139-171)/(51-64) 151/56  Flow (L/min) (Oxygen Therapy):  [2] 2  Physical Exam: Constitutional:       General: He is not in acute distress.     Appearance: Normal appearance. He is obese. He is not ill-appearing, toxic-appearing or diaphoretic.   HENT:      Head: Normocephalic and atraumatic.      Mouth/Throat:      Mouth: Mucous membranes are moist.      Pharynx: Oropharynx is clear.   Eyes:      General: No scleral icterus.        Right eye: No discharge.         Left eye: No discharge.      Extraocular Movements: Extraocular movements intact.      Conjunctiva/sclera: Conjunctivae normal.   Cardiovascular:      Rate and Rhythm: Normal rate and regular rhythm.      Heart sounds: Normal heart sounds. No murmur heard.  Pulmonary:      Effort: Pulmonary effort is normal. No respiratory distress.      Breath sounds: No stridor. Rhonchi present. No wheezing or rales.   Abdominal:      General: Abdomen is flat. Bowel sounds are normal. There is no distension.      Tenderness: There is no abdominal tenderness. There is no guarding.   Musculoskeletal:         General: No tenderness or signs of injury.      Cervical back: No tenderness.      Right lower leg: Edema (improving) present.      Left lower leg: Edema (improving) present.   Skin:     General: Skin is warm and dry.      Coloration: Skin is not jaundiced or pale.      Comments: Decubitus ulcer on sacrum and L heel covered   Neurological:      General: No focal deficit present.      Mental Status: He is alert and oriented to person, place, and time. Mental status is at baseline.      Cranial Nerves: No  cranial nerve deficit.      Motor: No weakness.   Psychiatric:         Mood and Affect: Mood normal.         Thought Content: Thought content normal.       Discharge Details        Discharge Medications        New Medications        Instructions Start Date   ciprofloxacin 500 MG tablet  Commonly known as: Cipro   500 mg, Oral, 2 Times Daily      metroNIDAZOLE 500 MG tablet  Commonly known as: Flagyl   500 mg, Oral, 3 Times Daily             Continue These Medications        Instructions Start Date   arformoterol 15 MCG/2ML nebulizer solution  Commonly known as: BROVANA   15 mcg, Nebulization, 2 Times Daily - RT      Aspirin Low Dose 81 MG EC tablet  Generic drug: aspirin   81 mg, Oral, Every Morning      atorvastatin 20 MG tablet  Commonly known as: LIPITOR   20 mg, Oral, Every Night at Bedtime      BASAGLAR KWIKPEN 100 UNIT/ML injection pen   15 Units, Subcutaneous, Daily      Betadine 5 % external solution 5%  Generic drug: Povidone-Iodine   1 Application, 2 Times Daily      budesonide 0.5 MG/2ML nebulizer solution  Commonly known as: Pulmicort   0.5 mg, Nebulization, 2 Times Daily      busPIRone 15 MG tablet  Commonly known as: BUSPAR   15 mg, Oral, 2 Times Daily      calcium citrate 950 (200 Ca) MG tablet  Commonly known as: CALCITRATE   950 mg, Oral, Every Night at Bedtime      dapagliflozin 5 MG tablet tablet  Commonly known as: Farxiga   5 mg, Oral, Every Morning      dilTIAZem  MG 24 hr capsule  Commonly known as: CARDIZEM CD   240 mg, Oral, Every Morning      docusate sodium 100 MG capsule  Commonly known as: COLACE   100 mg, Oral, 2 Times Daily      DULoxetine 30 MG capsule  Commonly known as: Cymbalta   30 mg, Oral, Daily      Eliquis 5 MG tablet tablet  Generic drug: apixaban   5 mg, Oral      famotidine 40 MG tablet  Commonly known as: PEPCID   40 mg, Every Morning      ferrous gluconate 324 MG tablet  Commonly known as: FERGON   324 mg, Oral, Every Morning      finasteride 5 MG tablet  Commonly  known as: PROSCAR   5 mg, Oral, Every Night at Bedtime      folic acid 1 MG tablet  Commonly known as: FOLVITE   1,000 mcg, Oral, Every Night at Bedtime      Insulin Lispro (1 Unit Dial) 100 UNIT/ML solution pen-injector  Commonly known as: HUMALOG   5 Units, Subcutaneous, 3 Times Daily Before Meals      ipratropium-albuterol 0.5-2.5 mg/3 ml nebulizer  Commonly known as: DUO-NEB   3 mL, Nebulization, Every 4 Hours PRN      Rosalino Nutrivigor pack   1 packet, Oral, 2 Times Daily      Magnesium Oxide -Mg Supplement 400 (240 Mg) MG tablet   400 mg, Oral, Every Night at Bedtime      montelukast 10 MG tablet  Commonly known as: SINGULAIR   10 mg, Oral, Nightly      Multi-Vitamin/Iron tablet   1 tablet, Oral, Every Morning      ondansetron ODT 4 MG disintegrating tablet  Commonly known as: ZOFRAN-ODT   4 mg, Translingual, Every 8 Hours PRN      Ozempic (1 MG/DOSE) 4 MG/3ML solution pen-injector  Generic drug: Semaglutide (1 MG/DOSE)   1 mg, Subcutaneous, Weekly      pantoprazole 40 MG EC tablet  Commonly known as: PROTONIX   40 mg, Oral, Every Early Morning      Santyl 250 UNIT/GM ointment  Generic drug: collagenase   1 Application, Topical, Daily      sodium hypochlorite 0.125 % solution topical solution 0.125%  Commonly known as: DAKIN'S 1/4 STRENGTH   10 mL, Topical, 2 Times Daily      tamsulosin 0.4 MG capsule 24 hr capsule  Commonly known as: FLOMAX   0.4 mg, Oral, Every Night at Bedtime      tiotropium 18 MCG per inhalation capsule  Commonly known as: SPIRIVA   1 capsule, Inhalation, Daily - RT      tobramycin  MG/5ML nebulizer solution  Commonly known as: MOIZ   300 mg, Nebulization, 2 Times Daily - RT, nebulize 1 vial BY MOUTH TWICE DAILY FOR 28 DAYS ON AND 28 DAYS OFF      vitamin C 500 MG tablet  Commonly known as: ASCORBIC ACID   500 mg, Oral, 2 Times Daily      Vitamin D3 50 MCG (2000 UT) tablet   50 mcg, Oral, Every Morning      zinc sulfate 220 (50 Zn) MG capsule  Commonly known as: ZINCATE   220 mg,  Oral, Every Morning             Stop These Medications      nitrofurantoin (macrocrystal-monohydrate) 100 MG capsule  Commonly known as: Macrobid              Allergies   Allergen Reactions    Benadryl [Diphenhydramine] Itching    Proventil [Albuterol] Other (See Comments)     Mouth sores         Discharge Disposition:  Home or Self Care    Diet:  Hospital:  Diet Order   Procedures    Diet: Cardiac, Diabetic, Fluid Restriction (240 mL/tray); Low Sodium (2g); Consistent Carbohydrate; 2000 mL/day; Fluid Consistency: Thin (IDDSI 0)       Discharge Activity:       CODE STATUS:  Code Status and Medical Interventions: CPR (Attempt to Resuscitate); Full Support   Ordered at: 04/22/25 1608     Code Status (Patient has no pulse and is not breathing):    CPR (Attempt to Resuscitate)     Medical Interventions (Patient has pulse or is breathing):    Full Support     Level Of Support Discussed With:    Patient         Future Appointments   Date Time Provider Department Center   4/28/2025 11:00 AM Katerin Torres MD [facility] W C [facility]   5/6/2025 12:00 PM Kimmy Riley MD [facility] PC BARDS [facility]   6/19/2025  8:15 AM Josefina Treviño APRN [facility] GE ETWR [facility]   6/27/2025 11:20 AM Neli Paredes APRCHARITY [facility] DIAB BT [facility]   6/30/2025  9:30 AM Betzaida Nelson APRCHARITY [facility] PCC BAR [facility]   4/13/2026 10:15 AM Kyra Natarajan [facility]   4/22/2026 11:00 AM Lino Ware MD [facility] CD BAPAT [facility]           Pertinent  and/or Most Recent Results     PROCEDURES:   None    LAB RESULTS:      Lab 04/24/25  0501 04/23/25  0534 04/22/25  1212   WBC 14.15* 14.58* 15.19*   HEMOGLOBIN 8.4* 8.7* 9.6*   HEMATOCRIT 27.2* 27.9* 31.4*   PLATELETS 409 459* 539*   NEUTROS ABS  --  10.65* 11.76*   IMMATURE GRANS (ABS)  --  0.07* 0.09*   LYMPHS ABS  --  2.16 1.92   MONOS ABS  --  1.12* 0.94*   EOS ABS  --  0.47* 0.37   MCV 89.5 89.7 90.2   PROCALCITONIN  --   --  0.24   LACTATE  --   --  0.9         Lab 04/24/25  0501 04/23/25  0534 04/22/25  1212  "  SODIUM 136 137 137   POTASSIUM 3.4* 4.0 4.3   CHLORIDE 95* 98 97*   CO2 28.7 28.6 31.2*   ANION GAP 12.3 10.4 8.8   BUN 22* 25* 26*   CREATININE 2.03* 1.84* 1.79*   EGFR 37.1* 41.7* 43.1*   GLUCOSE 125* 79 139*   CALCIUM 8.5* 8.8 9.9   MAGNESIUM  --  1.9 2.0         Lab 04/24/25  0501 04/22/25  1212   TOTAL PROTEIN 6.9 8.5   ALBUMIN 2.5* 3.4*   GLOBULIN 4.4 5.1   ALT (SGPT) 39 53*   AST (SGOT) 51* 72*   BILIRUBIN 0.3 0.2   ALK PHOS 224* 282*   LIPASE  --  46  44         Lab 04/22/25  1212   PROBNP 2,629.0*                 Brief Urine Lab Results  (Last result in the past 365 days)        Color   Clarity   Blood   Leuk Est   Nitrite   Protein   CREAT   Urine HCG        04/22/25 1306 Yellow   Clear   Negative   Negative   Negative   >=300 mg/dL (3+)                 Microbiology Results (last 10 days)       Procedure Component Value - Date/Time    Blood Culture - Blood, Arm, Left [[id number]]  (Normal) Collected: 04/22/25 1357    Lab Status: Preliminary result Specimen: Blood from Arm, Left Updated: 04/24/25 1415     Blood Culture No growth at 2 days    Blood Culture - Blood, Arm, Right [[id number]]  (Normal) Collected: 04/22/25 1357    Lab Status: Preliminary result Specimen: Blood from Arm, Right Updated: 04/24/25 1415     Blood Culture No growth at 2 days    Narrative:      Less than seven (7) mL's of blood was collected.  Insufficient quantity may yield false negative results.            CT Abdomen Pelvis Without Contrast  Addendum Date: 4/23/2025  ADDENDUM #1 Please ignore the erroneous stated \"hysterectomy\" for reproductive section. Patient state no acute process. Electronically Signed: Rob Milner MD  4/23/2025 3:19 PM EDT  Workstation ID: [id number] ORIGINAL REPORT: CT ABDOMEN PELVIS WO CONTRAST Date of Exam: 4/22/2025 2:52 PM EDT Indication: Eval abdominal pain and vomiting with elevated white blood cell count, history of chronic kidney disease. Comparison: 3/16/2025 Technique: Axial CT images were obtained " of the abdomen and pelvis without the administration of contrast. Reconstructed coronal and sagittal images were also obtained. Automated exposure control and iterative construction methods were used. Findings: LUNG BASES: Similar lower lung cylindrical bronchiectasis with cystic areas. Similar few tree-in-bud opacities in the left lower lobe. LIVER:  Unremarkable parenchyma without focal lesion. BILIARY/GALLBLADDER: Cholecystectomy SPLEEN:  Unremarkable PANCREAS:  Unremarkable ADRENAL:  Unremarkable KIDNEYS: Renal cortical atrophy with no solid mass identified. No obstruction.  No calculus identified. GASTROINTESTINAL/MESENTERY: There is a small sliding hiatal hernia. There is a left lower quadrant colostomy. No evidence of obstruction nor inflammation.  AORTA/IVC:  Normal caliber. RETROPERITONEUM/LYMPH NODES:  Unremarkable REPRODUCTIVE: Hysterectomy BLADDER:  Unremarkable OSSEUS STRUCTURES:  Typical for age with no acute process identified. Impression: 1.No acute findings in the abdomen or pelvis. 2.Similar lower lung cylindrical bronchiectasis with cystic areas. Similar few tree-in-bud opacities in the left lower lobe. 3.Small sliding hiatal hernia. 4.Left lower quadrant colostomy. No evidence of obstruction nor inflammation. Electronically Signed: Rob Milner MD  4/22/2025 3:48 PM EDT  Workstation ID: [id number]    Result Date: 4/23/2025  Impression: 1.No acute findings in the abdomen or pelvis. 2.Similar lower lung cylindrical bronchiectasis with cystic areas. Similar few tree-in-bud opacities in the left lower lobe. 3.Small sliding hiatal hernia. 4.Left lower quadrant colostomy. No evidence of obstruction nor inflammation. Electronically Signed: Rob Milner MD  4/22/2025 3:48 PM EDT  Workstation ID: [id number]    CT Chest Without Contrast Diagnostic  Result Date: 4/22/2025  Impression: 1.Severe bronchiectasis centrally in both lungs. 2.Improved right lower lobe infiltrate compared with the patient's last  study. 3.No new infiltrates are identified. 4.Small nodular densities peripherally in the lungs likely related to mucous plugging. 5.Trace pericardial fluid, decreased compared with the last study. 6.Coronary artery atherosclerotic calcifications. Electronically Signed: Iam Hope MD  4/22/2025 3:37 PM EDT  Workstation ID: [id number]    XR Chest 1 View  Result Date: 4/22/2025  Impression: 1.Small pleural effusions, right greater than left. 2.Background of chronic scarring and bronchiectasis with volume loss on the right. It would be difficult to rule out a superimposed infiltrate/pneumonia given the background of chronic lung disease. Electronically Signed: Deshawn Soto MD  4/22/2025 2:23 PM EDT  Workstation ID: [id number]       Results for orders placed during the hospital encounter of 04/14/24    Doppler Arterial Multi Level Lower Extremity - Bilateral CAR    Interpretation Summary    Right Conclusion: The right KEYLA is unable to be assessed due to vessel incompressibility. Normal digital pressures.    Left Conclusion: The left KEYLA is unable to be assessed due to vessel incompressibility. Unable to assess digital ischemia.    Nonocclusive, poorly compressible tibial level disease bilaterally.  Normal waveforms are present through the ankle levels bilaterally.      Results for orders placed during the hospital encounter of 04/14/24    Doppler Arterial Multi Level Lower Extremity - Bilateral CAR    Interpretation Summary    Right Conclusion: The right KEYLA is unable to be assessed due to vessel incompressibility. Normal digital pressures.    Left Conclusion: The left KEYLA is unable to be assessed due to vessel incompressibility. Unable to assess digital ischemia.    Nonocclusive, poorly compressible tibial level disease bilaterally.  Normal waveforms are present through the ankle levels bilaterally.      Results for orders placed during the hospital encounter of 01/23/25    Adult Transthoracic Echo Complete W/ Cont if  Necessary Per Protocol    Interpretation Summary    Left ventricular systolic function is low normal. Calculated left ventricular EF = 49.4%    Left ventricular wall thickness is consistent with moderate concentric hypertrophy.    Left ventricular diastolic function is consistent with (grade I) impaired relaxation.    There is a small (<1cm) pericardial effusion adjacent to the right ventricle.      Labs Pending at Discharge:  Pending Labs       Order Current Status    CANDIDA AURIS PCR - Swab, Axilla Right, Axilla Left and Groin In process    Blood Culture - Blood, Arm, Left Preliminary result    Blood Culture - Blood, Arm, Right Preliminary result              Time spent on Discharge including face to face service: Less than 30 minutes    Electronically signed by Ian Aquino MD, 04/24/25, 5:24 PM EDT.

## 2025-04-24 NOTE — PLAN OF CARE
Goal Outcome Evaluation:  Plan of Care Reviewed With: patient        Progress: no change  Outcome Evaluation: VSS, no c/o pain, nausea/ vomiting. Colostomy and lopez maintained throughout shift. Patient has been cleared by MD to go home. Will be going by EMS. Waiting for transportation for discharge.

## 2025-04-24 NOTE — THERAPY EVALUATION
Patient Name: Preston Wallis  : 1965    MRN: [id number]                              Today's Date: 2025       Admit Date: 2025    Visit Dx:     ICD-10-CM ICD-9-CM   1. Nausea and vomiting, unspecified vomiting type  R11.2 787.01   2. Leukocytosis, unspecified type  D72.829 288.60   3. SIRS (systemic inflammatory response syndrome)  R65.10 995.90     Patient Active Problem List   Diagnosis    Chronic cough    Polyneuropathy    Paroxysmal atrial fibrillation    Obstructive sleep apnea    Insomnia    Acute on chronic diastolic heart failure    Allergies    COPD exacerbation    Chronic anticoagulation    Benign prostatic hyperplasia    Difficulty using continuous positive airway pressure (CPAP) device    Stage 3a chronic kidney disease    Iron deficiency anemia secondary to inadequate dietary iron intake    Vitamin D deficiency    Lower extremity edema    Venous insufficiency (chronic) (peripheral)    Chronic dyspnea    Gastroesophageal reflux disease    Altered mental status    Hypertensive heart disease with chronic diastolic congestive heart failure    Diabetic neuropathy    Hyperlipidemia    Luetscher's syndrome    Neurogenic bladder    Pneumonia due to Pseudomonas species    Seizures    Chronic obstructive pulmonary disease    Essential hypertension    Chronic pain of left knee    Bronchiectasis with acute exacerbation    Bacterial pneumonia    Anemia    Closed fracture of left tibial plateau    Left foot pain    Chronic respiratory failure with hypoxia and hypercapnia    Impaired cognition    Atherosclerosis of native arteries of the extremities with ulceration    Chronic kidney disease, stage IV (severe)    Alcoholic cirrhosis    Allergic rhinitis    Major depressive disorder, single episode, in partial remission    Oxygen dependent    Type 2 diabetes mellitus with diabetic peripheral angiopathy without gangrene, with long-term current use of insulin    Nausea and vomiting     Past Medical  "History:   Diagnosis Date    1. Incision and drainage of posterior perianal abscess 2. Sharp excisional debridement through skin and subcutaneous tissue in the posterior perianal area, 9 x 6 x 1 cm 01/26/2025    Age-related cognitive decline     Allergic contact dermatitis     Allergies     Anemia     Asthma     Bedbound     11/2023 \"MY LEG MUSCLES STOPPED WORKING\"    Bronchiectasis with acute lower respiratory infection     Charcot foot due to diabetes mellitus 09/10/2013    Chronic diastolic (congestive) heart failure     Chronic kidney disease     Chronic respiratory failure with hypoxia     Closed supracondylar fracture of femur 01/12/2022    COPD (chronic obstructive pulmonary disease)     Deep vein thrombosis (DVT) of lower extremity associated with air travel 01/13/2023    Dependence on supplemental oxygen     Eczema     Elevated cholesterol     Erectile dysfunction     due to organic reasons    Essential (primary) hypertension     Fracture     closed fracture of other tarsal and metatarsal bones    Fracture of proximal humerus 01/13/2023    GERD without esophagitis     High risk medication use     Hypercholesteremia     Hypomagnesemia     Infected stasis ulcer of left lower extremity 01/13/2023    Insomnia     Low back pain     Major depressive disorder     Morbid (severe) obesity due to excess calories     MRSA pneumonia     Muscle weakness     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 without evidence of necrosis     Obstructive sleep apnea (adult) (pediatric)     On home O2     REPORTS WEARING 2L/NC AAT    Other forms of dyspnea     Other long term (current) drug therapy     Other specified noninfective gastroenteritis and colitis     Other spondylosis, lumbar region     Pain in both knees     Paroxysmal atrial fibrillation     Peripheral neuropathy     attributed to type 2 diabetes    Pneumonia, unspecified organism     Polyneuropathy     Rash and other " "nonspecific skin eruption     Self-catheterizes urinary bladder     EVERY 4 HOURS    Smoking     \"SOMETIMES\"    Syncope and collapse     Tachycardia     Tinnitus 01/13/2023    Type 1 diabetes mellitus with diabetic chronic kidney disease     Type 2 diabetes mellitus     Unspecified fall, initial encounter     Urinary retention      Past Surgical History:   Procedure Laterality Date    BRONCHOSCOPY N/A 1/28/2025    Procedure: BRONCHOSCOPY: BAL: insertion of lighted instrument to view inside the lung;  Surgeon: Walter Nicole DO;  Location: [facility] MAIN OR;  Service: Pulmonary;  Laterality: N/A;    BRONCHOSCOPY N/A 2/3/2025    Procedure: BRONCHOSCOPY WITH BRONCHOALVEOLAR LAVAGE, POSSIBLE BIOPSY, BRUSHING, WASHING, AIRWAY INSPECTION: insertion of lighted instrument to view inside the lung;  Surgeon: Chadd Swan MD;  Location: [facility] MAIN OR;  Service: Pulmonary;  Laterality: N/A;    CARDIAC CATHETERIZATION Left 8/15/2024    Procedure: Carbon dioxide aortogram with left leg angiogram, possible angioplasty or stenting;  Surgeon: Moshe Willson MD;  Location: [facility] CATH INVASIVE LOCATION;  Service: Vascular;  Laterality: Left;    CHOLECYSTECTOMY      COLOSTOMY N/A 2/6/2025    Procedure: COLOSTOMY LAPAROSCOPIC; plain text: creation of colostomy using small incisions;  Surgeon: Emerson Canada MD;  Location: [facility] [place] [facility];  Service: General;  Laterality: N/A;    CYSTOSCOPY      ENDOSCOPY N/A 3/18/2025    Procedure: ESOPHAGOGASTRODUODENOSCOPY WITH BIOPSIES;  Surgeon: Rafael Hernandez MD;  Location: [facility] ENDOSCOPY;  Service: Gastroenterology;  Laterality: N/A;  HIATAL HERNIA, REFLUX ESOPHAGITIS    FEMUR SURGERY Left     Shravan placed    INCISION AND DRAINAGE ABSCESS N/A 1/26/2025    Procedure: INCISION AND DRAINAGE ABSCESS; plain text: incision and drain pus from buttocks wound;  Surgeon: Emerson Canada MD;  Location: [facility] OR [facility]SC;  Service: General;  Laterality: N/A;    KNEE SURGERY Left     OTHER " SURGICAL HISTORY Left     venous port, REMOVED    PORTACATH PLACEMENT Right     TIBIAL PLATEAU OPEN REDUCTION INTERNAL FIXATION Left 12/22/2023    Procedure: TIBIAL PLATEAU OPEN REDUCTION INTERNAL FIXATION;  Surgeon: Hugo Kline MD;  Location: [facility] OR;  Service: Orthopedics;  Laterality: Left;    TONSILLECTOMY AND ADENOIDECTOMY        General Information       Row Name 04/24/25 0929 04/24/25 0916       OT Time and Intention    Document Type therapy note (daily note)  -PG evaluation  -PG    Mode of Treatment individual therapy;occupational therapy  -PG individual therapy;occupational therapy  -PG      Row Name 04/24/25 0916          General Information    Patient Profile Reviewed yes  Resides with wife and 2 older sons at home.  Primarily bedbound but is able to use sliding board to chair with assistance.  Assistance needed with LB self-care  -PG     Prior Level of Function mod assist:;ADL's  -PG     Existing Precautions/Restrictions fall  -PG     Barriers to Rehab none identified  -PG       Row Name 04/24/25 0916          Occupational Profile    Reason for Services/Referral (Occupational Profile) Patient is a 59-year-old male admitted for nausea and vomiting, SIRS and leukocytosis.  Patient is being evaluated by Occupational Therapy due to recent decline in ADL function.  No previous OT services identified  -PG       Row Name 04/24/25 0916          Living Environment    Current Living Arrangements home  -PG       Row Name 04/24/25 0916          Cognition    Orientation Status (Cognition) oriented x 3  -PG       Row Name 04/24/25 0916          Safety Issues/Impairments Affecting Functional Mobility    Impairments Affecting Function (Mobility) balance;endurance/activity tolerance;strength;range of motion (ROM)  -PG               User Key  (r) = Recorded By, (t) = Taken By, (c) = Cosigned By      Initials Name Provider Type    PG Layton Zimmer, OT Occupational Therapist                      Mobility/ADL's       Row Name 04/24/25 0923          Activities of Daily Living    BADL Assessment/Intervention bathing;upper body dressing;lower body dressing;grooming;toileting  -PG       [facility] Name 04/24/25 0923          Bathing Assessment/Intervention    [place] Level (Bathing) bathing skills;moderate assist (50% patient effort)  -PG       Row Name 04/24/25 0923          Upper Body Dressing Assessment/Training    [place] Level (Upper Body Dressing) upper body dressing skills;set up  -PG       Row Name 04/24/25 0923          Lower Body Dressing Assessment/Training    [place] Level (Lower Body Dressing) lower body dressing skills;dependent (less than 25% patient effort)  -PG       Row Name 04/24/25 0923          Grooming Assessment/Training    [place] Level (Grooming) grooming skills;set up  -PG       [facility] Name 04/24/25 0923          Toileting Assessment/Training    [place] Level (Toileting) toileting skills;dependent (less than 25% patient effort)  -PG               User Key  (r) = Recorded By, (t) = Taken By, (c) = Cosigned By      Initials Name Provider Type    PG Layton Zimmer, OT Occupational Therapist                   Obj/Interventions       Row Name 04/24/25 0924          Sensory Assessment (Somatosensory)    Sensory Assessment (Somatosensory) sensation intact  -PG       [facility] Name 04/24/25 0924          Vision Assessment/Intervention    Visual Impairment/Limitations WFL  -PG       [facility] Name 04/24/25 0924          Range of Motion Comprehensive    General Range of Motion upper extremity range of motion deficits identified  -PG     Comment, General Range of Motion R shld 75 degrees, LUE wfl  -PG       [facility] Name 04/24/25 0924          Strength Comprehensive (MMT)    Comment, General Manual Muscle Testing (MMT) Assessment RUE 3-/5 shld, LUE 4/5  -PG       Row Name 04/24/25 0929          Shoulder (Therapeutic Exercise)    Shoulder (Therapeutic Exercise) strengthening exercise  -PG     Shoulder  Strengthening (Therapeutic Exercise) 15 repititions;yellow;resistance band  -PG       Row Name 04/24/25 0929          Elbow/Forearm (Therapeutic Exercise)    Elbow/Forearm (Therapeutic Exercise) strengthening exercise  -PG     Elbow/Forearm Strengthening (Therapeutic Exercise) 15 repititions;yellow;resistance band  -PG       Row Name 04/24/25 0929 04/24/25 0924       Motor Skills    Motor Skills -- coordination;functional endurance  -PG    Coordination -- WFL  -PG    Functional Endurance -- fair minus  -PG    Therapeutic Exercise shoulder;elbow/forearm  -PG --              User Key  (r) = Recorded By, (t) = Taken By, (c) = Cosigned By      Initials Name Provider Type    PG Layton Zimmer, OT Occupational Therapist                   Goals/Plan       Row Name 04/24/25 0927          Transfer Goal 1 (OT)    Activity/Assistive Device (Transfer Goal 1, OT) bed-to-chair/chair-to-bed;other (see comments)  sliding board  -PG     [place] Level/Cues Needed (Transfer Goal 1, OT) minimum assist (75% or more patient effort)  -PG     Time Frame (Transfer Goal 1, OT) long term goal (LTG);10 days  -PG       Row Name 04/24/25 0927          Bathing Goal 1 (OT)    Activity/Device (Bathing Goal 1, OT) bathing skills, all  -PG     [place] Level/Cues Needed (Bathing Goal 1, OT) minimum assist (75% or more patient effort)  -PG     Time Frame (Bathing Goal 1, OT) long term goal (LTG);10 days  -PG       Row Name 04/24/25 0927          Dressing Goal 1 (OT)    Activity/Device (Dressing Goal 1, OT) dressing skills, all  -PG     [place]/Cues Needed (Dressing Goal 1, OT) minimum assist (75% or more patient effort)  -PG     Time Frame (Dressing Goal 1, OT) long term goal (LTG);10 days  -PG       Row Name 04/24/25 0927          Toileting Goal 1 (OT)    Activity/Device (Toileting Goal 1, OT) toileting skills, all  -PG     [place] Level/Cues Needed (Toileting Goal 1, OT) minimum assist (75% or more patient effort)  -PG     Time  Frame (Toileting Goal 1, OT) long term goal (LTG);10 days  -PG       Row Name 04/24/25 0927          Grooming Goal 1 (OT)    Activity/Device (Grooming Goal 1, OT) grooming skills, all  -PG     [place] (Grooming Goal 1, OT) set-up required  -PG     Time Frame (Grooming Goal 1, OT) long term goal (LTG);10 days  -PG       Row Name 04/24/25 0927          Problem Specific Goal 1 (OT)    Problem Specific Goal 1 (OT) Patient will improve activity tolerance to good minus to support independence with self-care activities  -PG     Time Frame (Problem Specific Goal 1, OT) long term goal (LTG);10 days  -PG       Row Name 04/24/25 0927          Therapy Assessment/Plan (OT)    Planned Therapy Interventions (OT) activity tolerance training;BADL retraining;strengthening exercise;transfer/mobility retraining;patient/caregiver education/training;occupation/activity based interventions  -PG               User Key  (r) = Recorded By, (t) = Taken By, (c) = Cosigned By      Initials Name Provider Type    PG Layton Zimmer, OT Occupational Therapist                   Clinical Impression       Row Name 04/24/25 0926          Pain Assessment    Pretreatment Pain Rating 3/10  -PG     Posttreatment Pain Rating 3/10  -PG     Pain Side/Orientation generalized  -PG     Pain Management Interventions nursing notified  -PG       Row Name 04/24/25 0926          Plan of Care Review    Plan of Care Reviewed With patient  -PG     Progress no change  -PG     Outcome Evaluation Patient presents with limitations affecting strength, activity tolerance, and balance impacting patient's ability to return home safely and independently.  The skills of a therapist will be required to safely and effectively implement the following treatment plan to restore maximal level of function  -PG       Row Name 04/24/25 0926          Therapy Assessment/Plan (OT)    Patient/Family Therapy Goal Statement (OT) Get stronger and return home  -PG     Rehab Potential (OT)  good  -PG     Criteria for Skilled Therapeutic Interventions Met (OT) yes;meets criteria;skilled treatment is necessary  -PG     Therapy Frequency (OT) 5 times/wk  -PG       Row Name 04/24/25 0926          Therapy Plan Review/Discharge Plan (OT)    Anticipated Discharge Disposition (OT) home with home health;home with assist  -PG               User Key  (r) = Recorded By, (t) = Taken By, (c) = Cosigned By      Initials Name Provider Type    PG Layton Zimmer, OT Occupational Therapist                   Outcome Measures       Row Name 04/24/25 0929          How much help from another is currently needed...    Putting on and taking off regular lower body clothing? 1  -PG     Bathing (including washing, rinsing, and drying) 2  -PG     Toileting (which includes using toilet bed pan or urinal) 1  -PG     Putting on and taking off regular upper body clothing 3  -PG     Taking care of personal grooming (such as brushing teeth) 4  -PG     Eating meals 4  -PG     AM-PAC 6 Clicks Score (OT) 15  -PG       Row Name 04/24/25 0726 04/23/25 0281       How much help from another person do you currently need...    Turning from your back to your side while in flat bed without using bedrails? 2  -SS 2  -HD    Moving from lying on back to sitting on the side of a flat bed without bedrails? 2  -SS 2  -HD    Moving to and from a bed to a chair (including a wheelchair)? 1  -SS 1  -HD    Standing up from a chair using your arms (e.g., wheelchair, bedside chair)? 1  -SS 1  -HD    Climbing 3-5 steps with a railing? 1  -SS 1  -HD    To walk in hospital room? 1  -SS 1  -HD    AM-PAC 6 Clicks Score (PT) 8  -SS 8  -HD      Row Name 04/24/25 0929          Functional Assessment    Outcome Measure Options AM-PAC 6 Clicks Daily Activity (OT);Optimal Instrument  -PG       Row Name 04/24/25 0929          Optimal Instrument    Optimal Instrument Optimal - 3  -PG     Bending/Stooping 4  -PG     Standing 4  -PG     Reaching 2  -PG     From the list,  choose the 3 activities you would most like to be able to do without any difficulty Bending/stooping;Standing;Reaching  -PG     Total Score Optimal - 3 10  -PG               User Key  (r) = Recorded By, (t) = Taken By, (c) = Cosigned By      Initials Name Provider Type    PG Layton Zimmer OT Occupational Therapist    Josefina Romero, RN Registered Nurse    Lizbeth Casas RN Registered Nurse                    Occupational Therapy Education       Title: PT OT SLP Therapies (Done)       Topic: Occupational Therapy (Done)       Point: ADL training (Done)       Learning Progress Summary            Patient Acceptance, E,D, DU by PG at 4/24/2025 0929                      Point: Home exercise program (Done)       Learning Progress Summary            Patient Acceptance, E,D, DU by PG at 4/24/2025 0929                      Point: Precautions (Done)       Learning Progress Summary            Patient Acceptance, E,D, DU by PG at 4/24/2025 0929                      Point: Body mechanics (Done)       Learning Progress Summary            Patient Acceptance, E,D, DU by PG at 4/24/2025 0929                                      User Key       Initials Effective Dates Name Provider Type Discipline    PG 06/16/21 -  Layton Zimmer OT Occupational Therapist OT                  OT Recommendation and Plan  Planned Therapy Interventions (OT): activity tolerance training, BADL retraining, strengthening exercise, transfer/mobility retraining, patient/caregiver education/training, occupation/activity based interventions  Therapy Frequency (OT): 5 times/wk  Plan of Care Review  Plan of Care Reviewed With: patient  Progress: no change  Outcome Evaluation: Patient presents with limitations affecting strength, activity tolerance, and balance impacting patient's ability to return home safely and independently.  The skills of a therapist will be required to safely and effectively implement the following treatment plan to restore maximal  level of function     Time Calculation:   Evaluation Complexity (OT)  Review Occupational Profile/Medical/Therapy History Complexity: brief/low complexity  Assessment, Occupational Performance/Identification of Deficit Complexity: 3-5 performance deficits  Clinical Decision Making Complexity (OT): problem focused assessment/low complexity  Overall Complexity of Evaluation (OT): low complexity     Time Calculation- OT       Row Name 04/24/25 0931             Time Calculation- OT    OT Received On 04/24/25  -PG      OT Goal Re-Cert Due Date 05/03/25  -PG         Timed Charges    15605 - OT Therapeutic Exercise Minutes 10  -PG         Untimed Charges    OT Eval/Re-eval Minutes 30  -PG         Total Minutes    Timed Charges Total Minutes 10  -PG      Untimed Charges Total Minutes 30  -PG       Total Minutes 40  -PG                User Key  (r) = Recorded By, (t) = Taken By, (c) = Cosigned By      Initials Name Provider Type    PG Layton Zimmer OT Occupational Therapist                  Therapy Charges for Today       Code Description Service Date Service Provider Modifiers Qty    [id number]  OT THER PROC EA 15 MIN 4/24/2025 Layton Zimmer OT GO 1    [id number]  OT EVAL LOW COMPLEXITY 2 4/24/2025 Layton Zimmer OT GO 1                 Layton Zimmer OT  4/24/2025

## 2025-04-24 NOTE — PLAN OF CARE
Goal Outcome Evaluation:           Progress: no change  Outcome Evaluation: Pt aox4, VSS. Wound care completed on L foot. Pt has no complaints of pain this shift. placed on monitor. Seems to be resting comfortably. Will continue to monitor.

## 2025-04-24 NOTE — PROGRESS NOTES
"[facility] Clinical Pharmacy Services: Vancomycin Monitoring Note    Preston Wallis is a 59 y.o. male who is on day 3 of pharmacy to dose vancomycin for Bacteremia and Intra-Abdominal Infection.    Previous Vancomycin Dose:   1500 mg IV every  24  hours  Imaging Reviewed?: Yes  Updated Cultures and Sensitivities:    Blood cx: NGTD    Vitals/Labs  Ht: 175.3 cm (69\"); Wt: 104 kg (230 lb 6.1 oz)   Temp (24hrs), Av.4 °F (36.9 °C), Min:98.2 °F (36.8 °C), Max:98.6 °F (37 °C)   Estimated Creatinine Clearance: 46.8 mL/min (A) (by C-G formula based on SCr of 2.03 mg/dL (H)).       Results from last 7 days   Lab Units 25  0501 25  0534 25  1212   VANCOMYCIN RM mcg/mL 31.73  --   --    CREATININE mg/dL 2.03* 1.84* 1.79*   WBC 10*3/mm3 14.15* 14.58* 15.19*     Assessment/Plan    Current Vancomycin Dose: Will hold dose for today () due to decreased renal function and current vancomycin level.          1500 mg IV every 24 hours.  Next Vanc Random ordered for  at 0600.   We will continue to monitor patient changes and renal function     Thank you for involving pharmacy in this patient's care. Please contact pharmacy with any questions or concerns.    Fatou Chadwick [facility]  Clinical Pharmacist      "

## 2025-04-24 NOTE — THERAPY EVALUATION
Acute Care - Physical Therapy Initial Evaluation  [facility]     Patient Name: Preston Wallis  : 1965  MRN: [id number]  Today's Date: 2025      Visit Dx:     ICD-10-CM ICD-9-CM   1. Nausea and vomiting, unspecified vomiting type  R11.2 787.01   2. Leukocytosis, unspecified type  D72.829 288.60   3. SIRS (systemic inflammatory response syndrome)  R65.10 995.90   4. Difficulty walking  R26.2 719.7     Patient Active Problem List   Diagnosis    Chronic cough    Polyneuropathy    Paroxysmal atrial fibrillation    Obstructive sleep apnea    Insomnia    Allergies    COPD exacerbation    Chronic anticoagulation    Benign prostatic hyperplasia    Difficulty using continuous positive airway pressure (CPAP) device    Stage 3a chronic kidney disease    Iron deficiency anemia secondary to inadequate dietary iron intake    Vitamin D deficiency    Lower extremity edema    Venous insufficiency (chronic) (peripheral)    Chronic dyspnea    Gastroesophageal reflux disease    Altered mental status    Hypertensive heart disease with chronic diastolic congestive heart failure    Diabetic neuropathy    Hyperlipidemia    Luetscher's syndrome    Neurogenic bladder    Pneumonia due to Pseudomonas species    Seizures    Chronic obstructive pulmonary disease    Essential hypertension    Chronic pain of left knee    Bronchiectasis with acute exacerbation    Bacterial pneumonia    Anemia    Closed fracture of left tibial plateau    Left foot pain    Chronic respiratory failure with hypoxia and hypercapnia    Impaired cognition    Atherosclerosis of native arteries of the extremities with ulceration    Chronic kidney disease, stage IV (severe)    Alcoholic cirrhosis    Allergic rhinitis    Major depressive disorder, single episode, in partial remission    Oxygen dependent    Type 2 diabetes mellitus with diabetic peripheral angiopathy without gangrene, with long-term current use of insulin    Nausea and vomiting     Past Medical  "History:   Diagnosis Date    1. Incision and drainage of posterior perianal abscess 2. Sharp excisional debridement through skin and subcutaneous tissue in the posterior perianal area, 9 x 6 x 1 cm 01/26/2025    Age-related cognitive decline     Allergic contact dermatitis     Allergies     Anemia     Asthma     Bedbound     11/2023 \"MY LEG MUSCLES STOPPED WORKING\"    Bronchiectasis with acute lower respiratory infection     Charcot foot due to diabetes mellitus 09/10/2013    Chronic diastolic (congestive) heart failure     Chronic kidney disease     Chronic respiratory failure with hypoxia     Closed supracondylar fracture of femur 01/12/2022    COPD (chronic obstructive pulmonary disease)     Deep vein thrombosis (DVT) of lower extremity associated with air travel 01/13/2023    Dependence on supplemental oxygen     Eczema     Elevated cholesterol     Erectile dysfunction     due to organic reasons    Essential (primary) hypertension     Fracture     closed fracture of other tarsal and metatarsal bones    Fracture of proximal humerus 01/13/2023    GERD without esophagitis     High risk medication use     Hypercholesteremia     Hypomagnesemia     Infected stasis ulcer of left lower extremity 01/13/2023    Insomnia     Low back pain     Major depressive disorder     Morbid (severe) obesity due to excess calories     MRSA pneumonia     Muscle weakness     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 without evidence of necrosis     Obstructive sleep apnea (adult) (pediatric)     On home O2     REPORTS WEARING 2L/NC AAT    Other forms of dyspnea     Other long term (current) drug therapy     Other specified noninfective gastroenteritis and colitis     Other spondylosis, lumbar region     Pain in both knees     Paroxysmal atrial fibrillation     Peripheral neuropathy     attributed to type 2 diabetes    Pneumonia, unspecified organism     Polyneuropathy     Rash and other " "nonspecific skin eruption     Self-catheterizes urinary bladder     EVERY 4 HOURS    Smoking     \"SOMETIMES\"    Syncope and collapse     Tachycardia     Tinnitus 01/13/2023    Type 1 diabetes mellitus with diabetic chronic kidney disease     Type 2 diabetes mellitus     Unspecified fall, initial encounter     Urinary retention      Past Surgical History:   Procedure Laterality Date    BRONCHOSCOPY N/A 1/28/2025    Procedure: BRONCHOSCOPY: BAL: insertion of lighted instrument to view inside the lung;  Surgeon: Walter Nicole DO;  Location: [facility] MAIN OR;  Service: Pulmonary;  Laterality: N/A;    BRONCHOSCOPY N/A 2/3/2025    Procedure: BRONCHOSCOPY WITH BRONCHOALVEOLAR LAVAGE, POSSIBLE BIOPSY, BRUSHING, WASHING, AIRWAY INSPECTION: insertion of lighted instrument to view inside the lung;  Surgeon: Chadd Swan MD;  Location: [facility] MAIN OR;  Service: Pulmonary;  Laterality: N/A;    CARDIAC CATHETERIZATION Left 8/15/2024    Procedure: Carbon dioxide aortogram with left leg angiogram, possible angioplasty or stenting;  Surgeon: Moshe Willson MD;  Location: [facility] CATH INVASIVE LOCATION;  Service: Vascular;  Laterality: Left;    CHOLECYSTECTOMY      COLOSTOMY N/A 2/6/2025    Procedure: COLOSTOMY LAPAROSCOPIC; plain text: creation of colostomy using small incisions;  Surgeon: Emerson Canada MD;  Location: [facility] OR [facility];  Service: General;  Laterality: N/A;    CYSTOSCOPY      ENDOSCOPY N/A 3/18/2025    Procedure: ESOPHAGOGASTRODUODENOSCOPY WITH BIOPSIES;  Surgeon: Rafael Hernandez MD;  Location: [facility] ENDOSCOPY;  Service: Gastroenterology;  Laterality: N/A;  HIATAL HERNIA, REFLUX ESOPHAGITIS    FEMUR SURGERY Left     Shravan placed    INCISION AND DRAINAGE ABSCESS N/A 1/26/2025    Procedure: INCISION AND DRAINAGE ABSCESS; plain text: incision and drain pus from buttocks wound;  Surgeon: Emerson Canada MD;  Location: [facility] OR [facility];  Service: General;  Laterality: N/A;    KNEE SURGERY Left     OTHER " SURGICAL HISTORY Left     venous port, REMOVED    PORTACATH PLACEMENT Right     TIBIAL PLATEAU OPEN REDUCTION INTERNAL FIXATION Left 12/22/2023    Procedure: TIBIAL PLATEAU OPEN REDUCTION INTERNAL FIXATION;  Surgeon: Hugo Kline MD;  Location: [facility];  Service: Orthopedics;  Laterality: Left;    TONSILLECTOMY AND ADENOIDECTOMY       PT Assessment (Last 12 Hours)       PT Evaluation and Treatment       Row Name 04/24/25 1300          Physical Therapy Time and Intention    Document Type evaluation  -AV     Mode of Treatment individual therapy;physical therapy  -AV       Row Name 04/24/25 1300          General Information    Patient Profile Reviewed yes  -AV     Patient Observations alert;cooperative;agree to therapy  -AV     Prior Level of Function --  Assist with ADLs. Recently has been bedbound but was able to transfer to manual or motorized w/c with sliding board and assist. 2L continuous O2.  -AV     Equipment Currently Used at Home wheelchair;wheelchair, motorized;slide board  -AV     Existing Precautions/Restrictions fall  Reports limiting sitting due to sacral wound  -AV       Row Name 04/24/25 1300          Living Environment    Current Living Arrangements home  -AV     Home Accessibility stairs to enter home  -AV     People in Home spouse;child(chon), adult  -AV       Row Name 04/24/25 1300          Home Main Entrance    Number of Stairs, Main Entrance other (see comments)  Ramp  -AV       Row Name 04/24/25 1300          Cognition    Orientation Status (Cognition) oriented x 3  -AV       Row Name 04/24/25 1300          Range of Motion (ROM)    Range of Motion bilateral lower extremities;ROM is WFL  -AV       Row Name 04/24/25 1300          Strength (Manual Muscle Testing)    Strength (Manual Muscle Testing) right lower extremity strength detail;left lower extremity strength detail  -AV     Left Lower Extremity Strength --  3-/5  -AV     Right Lower Extremity Strength --  3/5  -AV       Row  Name 04/24/25 1300          Bed Mobility    Comment, (Bed Mobility) Patient completes rolling with CGA. Furhter transfers deferred due to patient reports limiting sitting due to sacral wound  -AV       Row Name 04/24/25 1300          Safety Issues/Impairments Affecting Functional Mobility    Impairments Affecting Function (Mobility) balance;endurance/activity tolerance;pain;strength  -AV       Row Name 04/24/25 1300          Balance    Comment, Balance Not tested, likely impaired  -AV       Row Name             Wound 01/23/25 2330 coccyx pressure injury    Wound - Properties Group Placement Date: 01/23/25  -SW Placement Time: 2330  -SW Present on Original Admission: Y  -SW Location: coccyx  -SW Primary Wound Type: Pressure inj  -SW Type: pressure injury  -SW    Retired Wound - Properties Group Placement Date: 01/23/25  -SW Placement Time: 2330  -SW Present on Original Admission: Y  -SW Location: coccyx  -SW Primary Wound Type: Pressure inj  -SW Type: pressure injury  -SW    Retired Wound - Properties Group Placement Date: 01/23/25  -SW Placement Time: 2330  -SW Present on Original Admission: Y  -SW Location: coccyx  -SW Primary Wound Type: Pressure inj  -SW Type: pressure injury  -SW    Retired Wound - Properties Group Date first assessed: 01/23/25  -SW Time first assessed: 2330  -SW Present on Original Admission: Y  -SW Location: coccyx  -SW Primary Wound Type: Pressure inj  -SW Type: pressure injury  -SW      Row Name             Wound 02/16/24 1700 Left posterior foot Pressure Injury    Wound - Properties Group Placement Date: 02/16/24  -AG Placement Time: 1700  -AG Side: Left  -AG Orientation: posterior  -AG Location: foot  -AG Primary Wound Type: Pressure inj  -AG    Retired Wound - Properties Group Placement Date: 02/16/24  -AG Placement Time: 1700  -AG Side: Left  -AG Orientation: posterior  -AG Location: foot  -AG Primary Wound Type: Pressure inj  -AG    Retired Wound - Properties Group Placement Date:  02/16/24  -AG Placement Time: 1700 -AG Side: Left  -AG Orientation: posterior  -AG Location: foot  -AG Primary Wound Type: Pressure inj  -AG    Retired Wound - Properties Group Date first assessed: 02/16/24  -AG Time first assessed: 1700  -AG Side: Left  -AG Location: foot  -AG Primary Wound Type: Pressure inj  -AG      Row Name 04/24/25 1300          Plan of Care Review    Plan of Care Reviewed With patient  -AV     Progress no change  -AV     Outcome Evaluation Patient presents with deficits in balance, endurance, strength, and transfers. Patient will benefit from skilled PT services to address these mobility deficits and decrease risk of falls.  -AV       Row Name 04/24/25 1300          Therapy Assessment/Plan (PT)    Rehab Potential (PT) good  -AV     Criteria for Skilled Interventions Met (PT) yes;meets criteria  -AV     Therapy Frequency (PT) daily  -AV     Predicted Duration of Therapy Intervention (PT) 10 days  -AV     Problem List (PT) problems related to;balance;mobility;strength;pain  -AV     Activity Limitations Related to Problem List (PT) unable to transfer safely  -AV       Row Name 04/24/25 1300          PT Evaluation Complexity    History, PT Evaluation Complexity 1-2 personal factors and/or comorbidities  -AV     Examination of Body Systems (PT Eval Complexity) total of 3 or more elements  -AV     Clinical Presentation (PT Evaluation Complexity) stable  -AV     Clinical Decision Making (PT Evaluation Complexity) low complexity  -AV     Overall Complexity (PT Evaluation Complexity) low complexity  -AV       Row Name 04/24/25 1300          Therapy Plan Review/Discharge Plan (PT)    Therapy Plan Review (PT) evaluation/treatment results reviewed;patient  -AV       Row Name 04/24/25 1300          Physical Therapy Goals    Bed Mobility Goal Selection (PT) bed mobility, PT goal 1  -AV     Transfer Goal Selection (PT) transfer, PT goal 1  -AV       Row Name 04/24/25 1300          Bed Mobility Goal 1 (PT)     Activity/Assistive Device (Bed Mobility Goal 1, PT) sit to supine/supine to sit  -AV     Karnes Level/Cues Needed (Bed Mobility Goal 1, PT) minimum assist (75% or more patient effort)  -AV     Time Frame (Bed Mobility Goal 1, PT) 10 days  -AV       Row Name 04/24/25 1300          Transfer Goal 1 (PT)    Activity/Assistive Device (Transfer Goal 1, PT) bed-to-chair/chair-to-bed;wheelchair transfer;sliding board  -AV     Karnes Level/Cues Needed (Transfer Goal 1, PT) minimum assist (75% or more patient effort)  -AV     Time Frame (Transfer Goal 1, PT) 10 days  -AV               User Key  (r) = Recorded By, (t) = Taken By, (c) = Cosigned By      Initials Name Provider Type    Cindy Lorenzo, RN Registered Nurse    Andrea Henry, PT Physical Therapist    Florecita Burgess RN Registered Nurse                    Physical Therapy Education       Title: PT OT SLP Therapies (In Progress)       Topic: Physical Therapy (In Progress)       Point: Mobility training (Done)       Learning Progress Summary            Patient Acceptance, E,TB, VU by AV at 4/24/2025 1405                      Point: Home exercise program (Not Started)       Learner Progress:  Not documented in this visit.              Point: Body mechanics (Done)       Learning Progress Summary            Patient Acceptance, E,TB, VU by AV at 4/24/2025 1405                      Point: Precautions (Done)       Learning Progress Summary            Patient Acceptance, E,TB, VU by AV at 4/24/2025 1405                                      User Key       Initials Effective Dates Name Provider Type Discipline    AV 06/11/21 -  Andrea Ruiz, PT Physical Therapist PT                  PT Recommendation and Plan  Anticipated Discharge Disposition (PT): home with home health, home with assist  Planned Therapy Interventions (PT): balance training, bed mobility training, home exercise program, neuromuscular re-education, strengthening, transfer  training  Therapy Frequency (PT): daily  Plan of Care Reviewed With: patient  Progress: no change  Outcome Evaluation: Patient presents with deficits in balance, endurance, strength, and transfers. Patient will benefit from skilled PT services to address these mobility deficits and decrease risk of falls.   Outcome Measures       Row Name 04/24/25 1400             How much help from another person do you currently need...    Turning from your back to your side while in flat bed without using bedrails? 3  -AV      Moving from lying on back to sitting on the side of a flat bed without bedrails? 2  -AV      Moving to and from a bed to a chair (including a wheelchair)? 2  -AV      Standing up from a chair using your arms (e.g., wheelchair, bedside chair)? 1  -AV      Climbing 3-5 steps with a railing? 1  -AV      To walk in hospital room? 1  -AV      AM-PAC 6 Clicks Score (PT) 10  -AV         Functional Assessment    Outcome Measure Options AM-PAC 6 Clicks Basic Mobility (PT)  -AV                User Key  (r) = Recorded By, (t) = Taken By, (c) = Cosigned By      Initials Name Provider Type    AV Andrea Ruiz, PT Physical Therapist                     Time Calculation:    PT Charges       Row Name 04/24/25 1404             Time Calculation    PT Received On 04/24/25  -AV      PT Goal Re-Cert Due Date 05/03/25  -AV         Untimed Charges    PT Eval/Re-eval Minutes 20  -AV         Total Minutes    Untimed Charges Total Minutes 20  -AV       Total Minutes 20  -AV                User Key  (r) = Recorded By, (t) = Taken By, (c) = Cosigned By      Initials Name Provider Type    AV Andrea Ruiz, PT Physical Therapist                  Therapy Charges for Today       Code Description Service Date Service Provider Modifiers Qty    [id number]  PT EVAL LOW COMPLEXITY 2 4/24/2025 Andrea Ruiz, PT GP 1            PT G-Codes  Outcome Measure Options: AM-PAC 6 Clicks Basic Mobility (PT)  AM-PAC 6 Clicks Score (PT):  10  AM-PAC 6 Clicks Score (OT): 15    Andrea Ruiz, PT  4/24/2025

## 2025-04-25 ENCOUNTER — TRANSITIONAL CARE MANAGEMENT TELEPHONE ENCOUNTER (OUTPATIENT)
Dept: CALL CENTER | Facility: HOSPITAL | Age: 60
End: 2025-04-25
Payer: MEDICAID

## 2025-04-25 NOTE — OUTREACH NOTE
Call Center TCM Note      Flowsheet Row Responses   [facility] patient discharged from? [facility]   Does the patient have one of the following disease processes/diagnoses(primary or secondary)? CHF   TCM attempt successful? Yes  [verbal release for Kami wife]   Call start time 0941   Call end time 0947   Discharge diagnosis Acute on chronic diastolic heart failure   Person spoke with today (if not patient) and relationship Wife   Meds reviewed with patient/caregiver? Yes   Is the patient having any side effects they believe may be caused by any medication additions or changes? No   Is the patient taking all medications as directed (includes completed medication regime)? Yes   Medication comments taking atbs as ordered   Comments Hospital f/u 5/6/25@1200pm (appt in place at time of call)   Does the patient have an appointment with their PCP within 7-14 days of discharge? Yes   Nursing Interventions Confirmed date/time of appointment   What [facility] the [facility] health agency[facility]   Has home health visited the patient within 72 hours of discharge? Unsure   Psychosocial issues? No   Did the patient receive a copy of their discharge instructions? Yes   Nursing interventions Reviewed instructions with patient   What is the patient's perception of their health status since discharge? Same  [Wife reports pt still resting today, no complaints of n/v.  Aware to monitor for increase in edema and SOA.  Reports he is unable to weigh. She encourages healthy diet and has made diet changes lately for pt, uses a salt substitute.]   Nursing interventions Nurse provided patient education   Is the patient able to teach back signs and symptoms of worsening condition? (i.e. weight gain, shortness of air, etc.) Yes   Is the patient/caregiver able to teach back the hierarchy of who to call/visit for symptoms/problems? PCP, Specialist, Home health nurse, Urgent Care, ED, 911 Yes   CHF Zone this Call Green Zone   Green Zone  Patient reports doing well, No new or worsening shortness of breath   TCM call completed? Yes   Call end time 0947             EDU STANLEY - Registered Nurse    4/25/2025, 09:52 EDT

## 2025-04-25 NOTE — PLAN OF CARE
Goal Outcome Evaluation:      Pt  discharged at 2020 via ambulance. Partial assessment completed prior to d/c. No meds passed or BS done since left at 2020. Pt tolerated transfer to [facility] and belongings were given to EMS.

## 2025-04-25 NOTE — OUTREACH NOTE
Prep Survey      Flowsheet Row Responses   [facility] patient discharged from? [facility]   Is LACE score < 7 ? No   Eligibility [facility] [place]   Date of Admission 04/22/25   Date of Discharge 04/24/25   Discharge Disposition Home or Self Care   Discharge diagnosis Acute on chronic diastolic heart failure   Does the patient have one of the following disease processes/diagnoses(primary or secondary)? CHF   Does the patient have Home health ordered? Yes   [facility]   Is there a DME ordered? No   Prep survey completed? Yes            Romelia RODARTE - Registered Nurse

## 2025-04-26 LAB — C AURIS DNA SPEC QL NAA+NON-PROBE: NOT DETECTED

## 2025-04-27 LAB
BACTERIA SPEC AEROBE CULT: NORMAL
BACTERIA SPEC AEROBE CULT: NORMAL

## 2025-04-28 ENCOUNTER — PRIOR AUTHORIZATION (OUTPATIENT)
Dept: DIABETES SERVICES | Facility: HOSPITAL | Age: 60
End: 2025-04-28
Payer: COMMERCIAL

## 2025-04-28 ENCOUNTER — OFFICE VISIT (OUTPATIENT)
Dept: WOUND CARE | Facility: HOSPITAL | Age: 60
End: 2025-04-28
Payer: COMMERCIAL

## 2025-04-28 VITALS
TEMPERATURE: 97.7 F | HEART RATE: 106 BPM | SYSTOLIC BLOOD PRESSURE: 148 MMHG | RESPIRATION RATE: 18 BRPM | DIASTOLIC BLOOD PRESSURE: 77 MMHG

## 2025-04-28 DIAGNOSIS — J96.11 CHRONIC RESPIRATORY FAILURE WITH HYPOXIA, ON HOME O2 THERAPY: ICD-10-CM

## 2025-04-28 DIAGNOSIS — I50.32 CHRONIC DIASTOLIC HEART FAILURE: ICD-10-CM

## 2025-04-28 DIAGNOSIS — L89.152 PRESSURE INJURY OF SACRAL REGION, STAGE 2: ICD-10-CM

## 2025-04-28 DIAGNOSIS — E11.65 UNCONTROLLED TYPE 2 DIABETES MELLITUS WITH HYPERGLYCEMIA: ICD-10-CM

## 2025-04-28 DIAGNOSIS — I73.9 PERIPHERAL ARTERIAL DISEASE WITH HISTORY OF REVASCULARIZATION: ICD-10-CM

## 2025-04-28 DIAGNOSIS — L97.509 TYPE 2 DIABETES MELLITUS WITH FOOT ULCER, WITH LONG-TERM CURRENT USE OF INSULIN: ICD-10-CM

## 2025-04-28 DIAGNOSIS — Z99.81 CHRONIC RESPIRATORY FAILURE WITH HYPOXIA, ON HOME O2 THERAPY: ICD-10-CM

## 2025-04-28 DIAGNOSIS — L89.623 PRESSURE INJURY OF LEFT HEEL, STAGE 3: Primary | ICD-10-CM

## 2025-04-28 DIAGNOSIS — Z79.4 TYPE 2 DIABETES MELLITUS WITH FOOT ULCER, WITH LONG-TERM CURRENT USE OF INSULIN: ICD-10-CM

## 2025-04-28 DIAGNOSIS — Z98.890 PERIPHERAL ARTERIAL DISEASE WITH HISTORY OF REVASCULARIZATION: ICD-10-CM

## 2025-04-28 DIAGNOSIS — E11.621 TYPE 2 DIABETES MELLITUS WITH FOOT ULCER, WITH LONG-TERM CURRENT USE OF INSULIN: ICD-10-CM

## 2025-04-28 PROCEDURE — 3078F DIAST BP <80 MM HG: CPT | Performed by: EMERGENCY MEDICINE

## 2025-04-28 PROCEDURE — 1159F MED LIST DOCD IN RCRD: CPT | Performed by: EMERGENCY MEDICINE

## 2025-04-28 PROCEDURE — 3077F SYST BP >= 140 MM HG: CPT | Performed by: EMERGENCY MEDICINE

## 2025-04-28 PROCEDURE — 99214 OFFICE O/P EST MOD 30 MIN: CPT | Performed by: EMERGENCY MEDICINE

## 2025-04-28 PROCEDURE — 1160F RVW MEDS BY RX/DR IN RCRD: CPT | Performed by: EMERGENCY MEDICINE

## 2025-04-28 PROCEDURE — G0463 HOSPITAL OUTPT CLINIC VISIT: HCPCS | Performed by: EMERGENCY MEDICINE

## 2025-04-28 NOTE — PROGRESS NOTES
Chief Complaint  Wound Check (Pt here today for wound check to heel and coccyx, spouse applies dressing daily to coccyx and left heel gets santyl daily. Pt discharged from the hospital this past Thursday for elevated WBC. - no longer has dexcom so he does not know his CBG. - On ABX Cipro & Flagyl)    Subjective      History of Present Illness    Preston Wallis  is a 59 y.o. male     History of Present Illness  The patient is a 59-year-old male here for reevaluation of left heel and sacral pressure ulcers.    He was recently discharged from the hospital last Thursday, where he was admitted due to nausea and vomiting and an elevated white blood cell count, indicative of an infection. The source of the infection remains undetermined. He reports no pain associated with his wounds. He has been receiving weekly home health visits. His current treatment regimen includes the application of Santyl and Dakin's solution to the heel, followed by bandaging. The sacral wound is being managed with daily cleaning and application of Aquacel, which is then covered.    MEDICATIONS  Santyl, Dakin's solution, Aquacel    Allergies:  Benadryl [diphenhydramine] and Proventil [albuterol]      Current Outpatient Medications:     arformoterol (BROVANA) 15 MCG/2ML nebulizer solution, Take 2 mL by nebulization 2 (Two) Times a Day., Disp: 360 mL, Rfl: 3    Aspirin Low Dose 81 MG EC tablet, TAKE 1 TABLET BY MOUTH EVERY MORNING, Disp: 30 tablet, Rfl: 5    atorvastatin (LIPITOR) 20 MG tablet, TAKE 1 TABLET BY MOUTH EVERY NIGHT AT BEDTIME, Disp: 30 tablet, Rfl: 5    budesonide (Pulmicort) 0.5 MG/2ML nebulizer solution, Take 2 mL by nebulization 2 (Two) Times a Day., Disp: 360 mL, Rfl: 3    busPIRone (BUSPAR) 15 MG tablet, TAKE 1 TABLET BY MOUTH TWICE DAILY, Disp: 60 tablet, Rfl: 5    calcium citrate (CALCITRATE) 950 (200 Ca) MG tablet, TAKE 1 TABLET BY MOUTH EVERY NIGHT AT BEDTIME, Disp: 30 tablet, Rfl: 5    Cholecalciferol (Vitamin D3) 50 MCG (2000  UT) tablet, TAKE 1 TABLET BY MOUTH EVERY MORNING, Disp: 30 tablet, Rfl: 5    ciprofloxacin (Cipro) 500 MG tablet, Take 1 tablet by mouth 2 (Two) Times a Day., Disp: 10 tablet, Rfl: 0    collagenase (Santyl) 250 UNIT/GM ointment, Apply 1 Application topically to the appropriate area as directed Daily., Disp: 30 g, Rfl: 1    dapagliflozin (Farxiga) 5 MG tablet tablet, Take 1 tablet by mouth Every Morning., Disp: 30 tablet, Rfl: 4    dilTIAZem CD (CARDIZEM CD) 240 MG 24 hr capsule, Take 1 capsule by mouth Every Morning., Disp: 30 capsule, Rfl: 5    docusate sodium (COLACE) 100 MG capsule, TAKE 1 CAPSULE BY MOUTH TWICE DAILY, Disp: 60 capsule, Rfl: 5    DULoxetine (Cymbalta) 30 MG capsule, Take 1 capsule by mouth Daily., Disp: 30 capsule, Rfl: 5    Eliquis 5 MG tablet tablet, TAKE 1 TABLET BY MOUTH EVERY TWELVE HOURS, Disp: 60 tablet, Rfl: 5    famotidine (PEPCID) 40 MG tablet, Take 1 tablet by mouth Every Morning., Disp: , Rfl:     ferrous gluconate (FERGON) 324 MG tablet, Take 1 tablet by mouth Every Morning., Disp: 30 tablet, Rfl: 4    finasteride (PROSCAR) 5 MG tablet, TAKE 1 TABLET BY MOUTH EVERY NIGHT AT BEDTIME, Disp: 30 tablet, Rfl: 5    folic acid (FOLVITE) 1 MG tablet, TAKE 1 TABLET BY MOUTH EVERY NIGHT AT BEDTIME, Disp: 30 tablet, Rfl: 5    Insulin Glargine (BASAGLAR KWIKPEN) 100 UNIT/ML injection pen, Inject 15 Units under the skin into the appropriate area as directed Daily for 180 days., Disp: 15 mL, Rfl: 1    Insulin Lispro, 1 Unit Dial, (HUMALOG) 100 UNIT/ML solution pen-injector, Inject 5 Units under the skin into the appropriate area as directed 3 (Three) Times a Day Before Meals., Disp: 15 mL, Rfl: 0    ipratropium-albuterol (DUO-NEB) 0.5-2.5 mg/3 ml nebulizer, Take 3 mL by nebulization Every 4 (Four) Hours As Needed for Wheezing or Shortness of Air., Disp: 360 mL, Rfl: 5    Magnesium Oxide -Mg Supplement 400 (240 Mg) MG tablet, TAKE 1 TABLET BY MOUTH EVERY NIGHT AT BEDTIME, Disp: 30 tablet, Rfl: 5     metroNIDAZOLE (Flagyl) 500 MG tablet, Take 1 tablet by mouth 3 (Three) Times a Day., Disp: 15 tablet, Rfl: 0    montelukast (SINGULAIR) 10 MG tablet, Take 1 tablet by mouth Every Night., Disp: 30 tablet, Rfl: 5    Multiple Vitamins-Iron (Multi-Vitamin/Iron) tablet, TAKE 1 TABLET BY MOUTH EVERY MORNING, Disp: 60 each, Rfl: 5    Nutritional Supplements (Rosalino Nutrivigor) pack, Take 1 packet by mouth 2 (Two) Times a Day., Disp: 180 each, Rfl: 1    ondansetron ODT (ZOFRAN-ODT) 4 MG disintegrating tablet, Place 1 tablet on the tongue Every 8 (Eight) Hours As Needed for Nausea or Vomiting., Disp: 20 tablet, Rfl: 0    pantoprazole (PROTONIX) 40 MG EC tablet, Take 1 tablet by mouth Every Morning., Disp: 30 tablet, Rfl: 4    Povidone-Iodine (Betadine) 5 % external solution 5%, Apply 1 mL topically to the appropriate area as directed 2 (Two) Times a Day. Left Heel, Disp: , Rfl:     Semaglutide, 1 MG/DOSE, (Ozempic, 1 MG/DOSE,) 4 MG/3ML solution pen-injector, Inject 1 mg under the skin into the appropriate area as directed 1 (One) Time Per Week., Disp: 3 mL, Rfl: 5    sodium hypochlorite (DAKIN'S 1/4 STRENGTH) 0.125 % solution topical solution 0.125%, Apply 10 mL topically to the appropriate area as directed 2 (Two) Times a Day., Disp: 1000 mL, Rfl: 1    tamsulosin (FLOMAX) 0.4 MG capsule 24 hr capsule, TAKE 1 CAPSULE BY MOUTH EVERY NIGHT AT BEDTIME, Disp: 30 capsule, Rfl: 5    tiotropium (SPIRIVA) 18 MCG per inhalation capsule, Place 1 capsule into inhaler and inhale Daily., Disp: 30 capsule, Rfl: 5    vitamin C (ASCORBIC ACID) 500 MG tablet, TAKE 1 TABLET BY MOUTH TWICE DAILY, Disp: 60 tablet, Rfl: 5    zinc sulfate (ZINCATE) 220 (50 Zn) MG capsule, Take 1 capsule by mouth Every Morning., Disp: 30 capsule, Rfl: 5    Past Medical History:   Diagnosis Date    1. Incision and drainage of posterior perianal abscess 2. Sharp excisional debridement through skin and subcutaneous tissue in the posterior perianal area, 9 x 6 x 1 cm  "01/26/2025    Age-related cognitive decline     Allergic contact dermatitis     Allergies     Anemia     Asthma     Bedbound     11/2023 \"MY LEG MUSCLES STOPPED WORKING\"    Bronchiectasis with acute lower respiratory infection     Charcot foot due to diabetes mellitus 09/10/2013    Chronic diastolic (congestive) heart failure     Chronic kidney disease     Chronic respiratory failure with hypoxia     Closed supracondylar fracture of femur 01/12/2022    COPD (chronic obstructive pulmonary disease)     Deep vein thrombosis (DVT) of lower extremity associated with air travel 01/13/2023    Dependence on supplemental oxygen     Eczema     Elevated cholesterol     Erectile dysfunction     due to organic reasons    Essential (primary) hypertension     Fracture     closed fracture of other tarsal and metatarsal bones    Fracture of proximal humerus 01/13/2023    GERD without esophagitis     High risk medication use     Hypercholesteremia     Hypomagnesemia     Infected stasis ulcer of left lower extremity 01/13/2023    Insomnia     Low back pain     Major depressive disorder     Morbid (severe) obesity due to excess calories     MRSA pneumonia     Muscle weakness     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 without evidence of necrosis     Obstructive sleep apnea (adult) (pediatric)     On home O2     REPORTS WEARING 2L/NC AAT    Other forms of dyspnea     Other long term (current) drug therapy     Other specified noninfective gastroenteritis and colitis     Other spondylosis, lumbar region     Pain in both knees     Paroxysmal atrial fibrillation     Peripheral neuropathy     attributed to type 2 diabetes    Pneumonia, unspecified organism     Polyneuropathy     Rash and other nonspecific skin eruption     Self-catheterizes urinary bladder     EVERY 4 HOURS    Smoking     \"SOMETIMES\"    Syncope and collapse     Tachycardia     Tinnitus 01/13/2023    Type 1 diabetes mellitus with " diabetic chronic kidney disease     Type 2 diabetes mellitus     Unspecified fall, initial encounter     Urinary retention      Past Surgical History:   Procedure Laterality Date    BRONCHOSCOPY N/A 1/28/2025    Procedure: BRONCHOSCOPY: BAL: insertion of lighted instrument to view inside the lung;  Surgeon: Walter Nicole DO;  Location: [facility] MAIN OR;  Service: Pulmonary;  Laterality: N/A;    BRONCHOSCOPY N/A 2/3/2025    Procedure: BRONCHOSCOPY WITH BRONCHOALVEOLAR LAVAGE, POSSIBLE BIOPSY, BRUSHING, WASHING, AIRWAY INSPECTION: insertion of lighted instrument to view inside the lung;  Surgeon: Chadd Swan MD;  Location: [facility] MAIN OR;  Service: Pulmonary;  Laterality: N/A;    CARDIAC CATHETERIZATION Left 8/15/2024    Procedure: Carbon dioxide aortogram with left leg angiogram, possible angioplasty or stenting;  Surgeon: Moshe Willson MD;  Location: [facility] CATH INVASIVE LOCATION;  Service: Vascular;  Laterality: Left;    CHOLECYSTECTOMY      COLOSTOMY N/A 2/6/2025    Procedure: COLOSTOMY LAPAROSCOPIC; plain text: creation of colostomy using small incisions;  Surgeon: Emerson Canada MD;  Location: [facility] OR OSC;  Service: General;  Laterality: N/A;    CYSTOSCOPY      ENDOSCOPY N/A 3/18/2025    Procedure: ESOPHAGOGASTRODUODENOSCOPY WITH BIOPSIES;  Surgeon: Rafael Hernandez MD;  Location: [facility] ENDOSCOPY;  Service: Gastroenterology;  Laterality: N/A;  HIATAL HERNIA, REFLUX ESOPHAGITIS    FEMUR SURGERY Left     Shravan placed    INCISION AND DRAINAGE ABSCESS N/A 1/26/2025    Procedure: INCISION AND DRAINAGE ABSCESS; plain text: incision and drain pus from buttocks wound;  Surgeon: Emerson Canada MD;  Location: [facility] OR OSC;  Service: General;  Laterality: N/A;    KNEE SURGERY Left     OTHER SURGICAL HISTORY Left     venous port, REMOVED    PORTACATH PLACEMENT Right     TIBIAL PLATEAU OPEN REDUCTION INTERNAL FIXATION Left 12/22/2023    Procedure: TIBIAL PLATEAU OPEN REDUCTION INTERNAL FIXATION;   Surgeon: Hugo Kline MD;  Location: [facility] OR;  Service: Orthopedics;  Laterality: Left;    TONSILLECTOMY AND ADENOIDECTOMY       Social History     Socioeconomic History    Marital status:    Tobacco Use    Smoking status: Former     Current packs/day: 0.00     Average packs/day: 1 pack/day for 12.0 years (12.0 ttl pk-yrs)     Types: Cigarettes     Start date:      Quit date:      Years since quittin.3     Passive exposure: Past    Smokeless tobacco: Never   Vaping Use    Vaping status: Never Used   Substance and Sexual Activity    Alcohol use: Not Currently    Drug use: Never    Sexual activity: Defer           Objective     Vitals:    25 1137   BP: 148/77   BP Location: Left arm   Patient Position: Sitting   Cuff Size: Adult   Pulse: 106   Resp: 18  Comment: 98% RA   Temp: 97.7 °F (36.5 °C)   TempSrc: Temporal   PainSc: 3    PainLoc: Foot     There is no height or weight on file to calculate BMI.    STEADI Fall Risk Assessment has not been completed.     Review of Systems     ROS:  Per HPI.     I have reviewed the HPI and ROS as documented by MA/RN. Katerin Torres MD    Physical Exam     NAD  AAOx3, pleasant, cooperative      Physical Exam  The patient's left heel wound exhibits some slough but improving granulation tissue present and decreasing size. There is periwound maceration noted but no surrounding deep tissue pressure injury. Sacral wound with healthy pink base, no evidence of infection, no surrounding deep tissue pressure injury.               Result Review :  The following data was reviewed by: Katerin Torres MD on 2025:    Prior notes and images.  CBC, CMP, blood culture x 2, ED note, discharge summary, CT chest abdomen pelvis,               Assessment and Plan   Diagnoses and all orders for this visit:    1. Pressure injury of left heel, stage 3 (Primary)    2. Pressure injury of sacral region, stage 2    3. Type 2 diabetes mellitus with foot ulcer,  with long-term current use of insulin    4. Peripheral arterial disease with history of revascularization    5. Chronic diastolic heart failure    6. Uncontrolled type 2 diabetes mellitus with hyperglycemia    7. Chronic respiratory failure with hypoxia, on home O2 therapy        Assessment & Plan  1. Left heel pressure ulcer.  The left heel wound exhibits some slough but improving granulation tissue and decreasing size. There is periwound maceration noted but no surrounding deep tissue pressure injury. The current treatment with Santyl will continue, and Dakin's solution will be discontinued due to excessive moisture causing maceration. The wound will be covered with a bandage once a day.    2. Sacral pressure ulcer.  The sacral wound has a healthy pink base with no evidence of infection or surrounding deep tissue pressure injury. A small piece of collagen with silver will be applied to the wound today to expedite healing, followed by coverage with Aquacel Ag or equivalent Hydrofiber with silver. He is advised to maintain pressure relief on the affected areas to facilitate faster healing.    3. Skin abrasions.  For minor skin abrasions on the legs, the use of Vaseline or Aquaphor is recommended to keep the skin healthy, less dry, less itchy, and less likely to break open.    Patient has home health coming once a week and his wife does the dressing changes the other days.    Follow-up  The patient will follow up in 3 weeks or sooner if needed.      Patient was given instructions and counseling regarding their condition or for health maintenance advice, as well as the wound care plan and recommendations. They understand and agree with the plan.  They will follow back up here in the clinic but are instructed to contact us in the interim should they have any new, returning, or worsening symptoms or concerns. Please see specific information pulled into the AVS if appropriate.     Dragon Dictation utilized for chart  completion.    Follow Up   Return in about 3 weeks (around 5/19/2025).      Katerin Torres MD    Patient or patient representative verbalized consent for the use of Ambient Listening during the visit with  Katerin Torres MD for chart documentation. 4/28/2025  12:26 EDT

## 2025-04-28 NOTE — TELEPHONE ENCOUNTER
Ozempic Approved    The authorization is effective from 04/28/2025 to 10/27/2025, as long as you are enrolled as   a member of your current health plan.    ENDOCRINOLOGY - SCAN - ozempic approval letter (04/28/2025)

## 2025-04-28 NOTE — TELEPHONE ENCOUNTER
PA request received from [facility] for the following medication    Semaglutide, 1 MG/DOSE, (Ozempic, 1 MG/DOSE,) 4 MG/3ML solution pen-injector (12/11/2024)     Key: [id number]

## 2025-04-28 NOTE — PROGRESS NOTES
Wound Location: Left heel     Wound Exudate: moderate  Wound Debridement: Mechanically debrided with normal saline & gauze  Wound Thickness: full    Cleanse with:  NS or antibacterial soap and water    Primary: silicone bordered dressing    Francisca-wound: skin prep    Instructions: Cleanse wound with ns or antibacterial soap and water, pat dry, apply mary thick layer of santyl to wound base and cover with silicone bordered dressing. Change Daily    Wound Location: Coccyx    Wound Exudate: moderate  Wound Debridement: Mechanically debrided with normal saline & gauze  Wound Thickness: full    Cleanse with:  NS or antibacterial soap and water    Primary: Collagen Ag  Secondary: Aquacel Ag  Secured with: silicone bordered dressing  Francisca-wound: Dry     Instructions: Cleanse wound with ns or antibacterial soap and water, pat dry, apply Collagen Ag to wound base, place Aquacel Ag over Collagen Ag and secure with silicone bordered dressing. Change Daily    Education: Educated patient/family member/CG on how to apply new dressings, verbal understanding provided. Patient/family member/CG instructed to call with any questions or concerns

## 2025-04-30 ENCOUNTER — PATIENT OUTREACH (OUTPATIENT)
Dept: CASE MANAGEMENT | Facility: OTHER | Age: 60
End: 2025-04-30
Payer: COMMERCIAL

## 2025-04-30 DIAGNOSIS — Z79.4 TYPE 2 DIABETES MELLITUS WITH HYPERGLYCEMIA, WITH LONG-TERM CURRENT USE OF INSULIN: ICD-10-CM

## 2025-04-30 DIAGNOSIS — N18.31 STAGE 3A CHRONIC KIDNEY DISEASE: Primary | ICD-10-CM

## 2025-04-30 DIAGNOSIS — J44.1 CHRONIC OBSTRUCTIVE PULMONARY DISEASE WITH ACUTE EXACERBATION: ICD-10-CM

## 2025-04-30 DIAGNOSIS — J96.12 CHRONIC RESPIRATORY FAILURE WITH HYPOXIA AND HYPERCAPNIA: ICD-10-CM

## 2025-04-30 DIAGNOSIS — E11.65 TYPE 2 DIABETES MELLITUS WITH HYPERGLYCEMIA, WITH LONG-TERM CURRENT USE OF INSULIN: ICD-10-CM

## 2025-04-30 DIAGNOSIS — J96.11 CHRONIC RESPIRATORY FAILURE WITH HYPOXIA AND HYPERCAPNIA: ICD-10-CM

## 2025-04-30 NOTE — OUTREACH NOTE
[facility] End of Month Documentation    This Chronic Medical Management Care Plan for Preston Wallis, 59 y.o. male, has been monitored and managed; reviewed and a new plan of care implemented for the month of April.  A cumulative time of 74  minutes was spent on this patient record this month, including phone call with care giver; electronic communication with primary care provider; electronic communication with pharmacist; chart review.    Regarding the patient's problems: has Chronic cough; Polyneuropathy; Paroxysmal atrial fibrillation; Obstructive sleep apnea; Insomnia; Allergies; COPD exacerbation; Chronic anticoagulation; Benign prostatic hyperplasia; Difficulty using continuous positive airway pressure (CPAP) device; Stage 3a chronic kidney disease; Iron deficiency anemia secondary to inadequate dietary iron intake; Vitamin D deficiency; Lower extremity edema; Venous insufficiency (chronic) (peripheral); Chronic dyspnea; Gastroesophageal reflux disease; Altered mental status; Hypertensive heart disease with chronic diastolic congestive heart failure; Diabetic neuropathy; Hyperlipidemia; Luetscher's syndrome; Neurogenic bladder; Pneumonia due to Pseudomonas species; Seizures; Chronic obstructive pulmonary disease; Essential hypertension; Chronic pain of left knee; Bronchiectasis with acute exacerbation; Bacterial pneumonia; Anemia; Closed fracture of left tibial plateau; Left foot pain; Chronic respiratory failure with hypoxia and hypercapnia; Impaired cognition; Atherosclerosis of native arteries of the extremities with ulceration; Chronic kidney disease, stage IV (severe); Alcoholic cirrhosis; Allergic rhinitis; Major depressive disorder, single episode, in partial remission; Oxygen dependent; Type 2 diabetes mellitus with diabetic peripheral angiopathy without gangrene, with long-term current use of insulin; and Nausea and vomiting on their problem list., the following items were addressed: medications; transitions  to medical care; medical records; referrals to community service providers and any changes can be found within the plan section of the note.  A detailed listing of time spent for chronic care management is tracked within each outreach encounter.  Current medications include:  has a current medication list which includes the following prescription(s): arformoterol, aspirin low dose, atorvastatin, budesonide, buspirone, calcium citrate, vitamin d3, ciprofloxacin, santyl, dapagliflozin, diltiazem cd, docusate sodium, duloxetine, eliquis, famotidine, ferrous gluconate, finasteride, folic acid, basaglar kwikpen, insulin lispro (1 unit dial), ipratropium-albuterol, magnesium oxide -mg supplement, metronidazole, montelukast, multi-vitamin/iron, arlen nutrivigor, ondansetron odt, pantoprazole, betadine, ozempic (1 mg/dose), sodium hypochlorite, tamsulosin, tiotropium, vitamin c, and zinc sulfate. and the patient is reported to be caregiver will take responsibility for med compliance; patient is compliant with medication protocol,  Medications are reported to be non-effective in controlling symptoms and changes have been made to the medication protocol.  Regarding these diagnoses, referrals were made to the following provider(s):  specialists.  All notes on chart for PCP to review.    The patient was monitored remotely for pain; medications; blood glucose; mood & behavior; activity level.    The patient's physical needs include:  help taking medications as prescribed; medication education; needs assistance with ADLs; resources for disability needs; DME supplies.     The patient's mental support needs include:  continued support    The patient's cognitive support needs include:  medication; continued support; needs assistance with ADLs; health care    The patient's psychosocial support needs include:  continued support; coordination of community providers; medication management or adherence    The patient's functional needs  include: health care coverage; medication education; needs assistance for ADLs; physical healthcare; physician referral; resources for disability needs    The patient's environmental needs include:  resources for disability needs; no involvement in outside activities or no access to other activities    Care Plan overall comments:  Still not at goal, however improving. will continue to follow. Has many upcoming appointments and referrals to specialists. A1c not at goal, continuing ways to improve with addition of new medications. Will follow, new hospitalization.    Refer to previous outreach notes for more information on the areas listed above.    Monthly Billing Diagnoses  (N18.31) Stage 3a chronic kidney disease    (E11.65,  Z79.4) Type 2 diabetes mellitus with hyperglycemia, with long-term current use of insulin    (J96.11,  J96.12) Chronic respiratory failure with hypoxia and hypercapnia    (J44.1) Chronic obstructive pulmonary disease with acute exacerbation    Medications   Medications have been reconciled    Care Plan progress this month:      Recently Modified Care Plans Updates made since 3/30/2025 12:00 AM      No recently modified care plans.             Chronic Kidney       Track and Manage My Symptoms (Not Progressing)       Start:  12/06/24    Expected End:  06/06/25         My Symptom Management To Do List       Start:  12/06/24         Why is this important?  Keeping track of symptoms can help you feel the best. It also helps the doctor stay on top of any changes to the disease. It may also help keep your disease from getting worse. Taking simple steps can help you cope with  symptoms like feeling very tired or itchy skin.              Follow My Treatment Plan (Progressing)       Start:  12/06/24    Expected End:  06/06/25         My Treatment Plan To Do List       Start:  12/06/24         Why is this important?  Staying as healthy as you can is very important. This may mean making changes if you  smoke, don't exercise or eat poorly.  A healthy lifestyle is an important goal for you.  Following the treatment plan and making changes may be hard.  Try some of these steps to help keep the disease from getting worse.              Manage My Diet (Not Progressing)       Start:  12/06/24    Expected End:  06/06/25         My Diet Management To Do List       Start:  12/06/24         Why is this important?  A healthy diet is important for mental and physical health.  Healthy food helps repair damaged body tissue and maintains strong bones and muscles.  No single food is just right so eating a variety of proteins, fruits, vegetables and grains is best.  You may need to change what you eat or drink to manage kidney disease.  A dietitian is the best person to guide you.              Optimal Care Coordination of a Patient Experiencing Chronic Kidney Disease (Progressing)       Start:  12/06/24    Expected End:  06/06/25         Support Psychological Response to Chronic Kidney Disease       Start:  12/06/24          Initial    Support Psychological Response to Chronic Kidney Disease: caregiver stress acknowledged;caregiver support provided;goal setting facilitated;positive reinforcement provided;self-care encouraged taken at 12/06/24         Facilitate Activities to Optimize Comfort and Well-Being       Start:  12/06/24          Initial    Facilitate Activities to Optimize Comfort and Well-Being: alternating periods of activity with rest promoted;sleep-hygiene practices encouraged taken at 12/06/24         Alleviate Barriers to Chronic Kidney Disease Treatment       Start:  12/06/24          Initial    Alleviate Barriers to Chronic Kidney Disease Treatment: medication side effects managed;barriers to treatment adherence identified;activity or exercise based on tolerance encouraged Reached out to nephrology office to review labs and any interventions needed.  Sent abnormal labs to oncall provider and pcp. taken at 12/06/24          Maintain or Improve Strength or Functional Ability       Start:  12/06/24          Initial    Maintain or Improve Strength or Functional Ability: activity or exercise based on tolerance encouraged;assistive or adaptive device use encouraged taken at 12/06/24         Alleviate Barriers to Optimal Nutrition Status       Start:  12/06/24          Initial    Alleviate Barriers to Optimal Nutrition Status: diversity of foods encouraged;support and encouragement provided taken at 12/06/24         Facilitate Multi-Modal Pain Management       Start:  12/06/24          Initial    Facilitate Multi-Modal Pain Management: pain assessed taken at 12/06/24         Develop and Maintain Palliative Care Plan       Start:  12/06/24                Current Specialty Plan of Care Status signed by both patient and provider    Instructions   Patient was provided an electronic copy of care plan  CCM services were explained and offered and patient has accepted these services.  Patient has given their written consent to receive CCM services and understands that this includes the authorization of electronic communication of medical information with the other treating providers.  Patient understands that they may stop CCM services at any time and these changes will be effective at the end of the calendar month and will effectively revocate the agreement of CCM services.  Patient understands that only one practitioner can furnish and be paid for CCM services during one calendar month.  Patient also understands that there may be co-payment or deductible fees in association with CCM services.  Patient will continue with at least monthly follow-up calls with the Ambulatory .    Tara GOMEZ  Ambulatory Case Management    4/30/2025, 10:06 EDT

## 2025-05-06 ENCOUNTER — LAB (OUTPATIENT)
Dept: LAB | Facility: HOSPITAL | Age: 60
End: 2025-05-06
Payer: COMMERCIAL

## 2025-05-06 ENCOUNTER — OFFICE VISIT (OUTPATIENT)
Dept: FAMILY MEDICINE CLINIC | Age: 60
End: 2025-05-06
Payer: COMMERCIAL

## 2025-05-06 VITALS
HEART RATE: 106 BPM | HEIGHT: 69 IN | BODY MASS INDEX: 34.01 KG/M2 | SYSTOLIC BLOOD PRESSURE: 145 MMHG | DIASTOLIC BLOOD PRESSURE: 67 MMHG | OXYGEN SATURATION: 98 %

## 2025-05-06 DIAGNOSIS — J44.1 COPD EXACERBATION: ICD-10-CM

## 2025-05-06 DIAGNOSIS — J44.9 CHRONIC OBSTRUCTIVE PULMONARY DISEASE, UNSPECIFIED COPD TYPE: ICD-10-CM

## 2025-05-06 DIAGNOSIS — N18.31 STAGE 3A CHRONIC KIDNEY DISEASE: ICD-10-CM

## 2025-05-06 DIAGNOSIS — R33.8 BENIGN PROSTATIC HYPERPLASIA WITH URINARY RETENTION: ICD-10-CM

## 2025-05-06 DIAGNOSIS — L97.521 ULCER OF LEFT FOOT, LIMITED TO BREAKDOWN OF SKIN: ICD-10-CM

## 2025-05-06 DIAGNOSIS — N31.9 NEUROGENIC BLADDER: ICD-10-CM

## 2025-05-06 DIAGNOSIS — F32.A ANXIETY AND DEPRESSION: ICD-10-CM

## 2025-05-06 DIAGNOSIS — N39.0 URINARY TRACT INFECTION WITHOUT HEMATURIA, SITE UNSPECIFIED: Primary | ICD-10-CM

## 2025-05-06 DIAGNOSIS — E11.65 UNCONTROLLED TYPE 2 DIABETES MELLITUS WITH HYPERGLYCEMIA: ICD-10-CM

## 2025-05-06 DIAGNOSIS — D50.8 IRON DEFICIENCY ANEMIA SECONDARY TO INADEQUATE DIETARY IRON INTAKE: ICD-10-CM

## 2025-05-06 DIAGNOSIS — G47.33 OBSTRUCTIVE SLEEP APNEA: ICD-10-CM

## 2025-05-06 DIAGNOSIS — E11.51 TYPE 2 DIABETES MELLITUS WITH DIABETIC PERIPHERAL ANGIOPATHY WITHOUT GANGRENE, WITH LONG-TERM CURRENT USE OF INSULIN: Chronic | ICD-10-CM

## 2025-05-06 DIAGNOSIS — N40.1 BENIGN PROSTATIC HYPERPLASIA WITH LOWER URINARY TRACT SYMPTOMS, SYMPTOM DETAILS UNSPECIFIED: ICD-10-CM

## 2025-05-06 DIAGNOSIS — I10 ESSENTIAL HYPERTENSION: ICD-10-CM

## 2025-05-06 DIAGNOSIS — E55.9 VITAMIN D DEFICIENCY: ICD-10-CM

## 2025-05-06 DIAGNOSIS — E11.65 TYPE 2 DIABETES MELLITUS WITH HYPERGLYCEMIA, WITH LONG-TERM CURRENT USE OF INSULIN: ICD-10-CM

## 2025-05-06 DIAGNOSIS — K59.09 OTHER CONSTIPATION: ICD-10-CM

## 2025-05-06 DIAGNOSIS — F41.9 ANXIETY: ICD-10-CM

## 2025-05-06 DIAGNOSIS — N18.32 STAGE 3B CHRONIC KIDNEY DISEASE (CKD): ICD-10-CM

## 2025-05-06 DIAGNOSIS — K21.9 GASTROESOPHAGEAL REFLUX DISEASE, UNSPECIFIED WHETHER ESOPHAGITIS PRESENT: ICD-10-CM

## 2025-05-06 DIAGNOSIS — E78.2 MIXED HYPERLIPIDEMIA: ICD-10-CM

## 2025-05-06 DIAGNOSIS — I48.0 PAROXYSMAL ATRIAL FIBRILLATION: ICD-10-CM

## 2025-05-06 DIAGNOSIS — D50.9 IRON DEFICIENCY ANEMIA, UNSPECIFIED IRON DEFICIENCY ANEMIA TYPE: ICD-10-CM

## 2025-05-06 DIAGNOSIS — N40.1 BENIGN PROSTATIC HYPERPLASIA WITH URINARY RETENTION: ICD-10-CM

## 2025-05-06 DIAGNOSIS — E78.5 HYPERLIPIDEMIA, UNSPECIFIED HYPERLIPIDEMIA TYPE: ICD-10-CM

## 2025-05-06 DIAGNOSIS — K21.9 GASTROESOPHAGEAL REFLUX DISEASE WITHOUT ESOPHAGITIS: ICD-10-CM

## 2025-05-06 DIAGNOSIS — D72.829 LEUKOCYTOSIS, UNSPECIFIED TYPE: ICD-10-CM

## 2025-05-06 DIAGNOSIS — Z79.4 TYPE 2 DIABETES MELLITUS WITH HYPERGLYCEMIA, WITH LONG-TERM CURRENT USE OF INSULIN: ICD-10-CM

## 2025-05-06 DIAGNOSIS — F41.9 ANXIETY AND DEPRESSION: ICD-10-CM

## 2025-05-06 DIAGNOSIS — Z79.4 TYPE 2 DIABETES MELLITUS WITH DIABETIC PERIPHERAL ANGIOPATHY WITHOUT GANGRENE, WITH LONG-TERM CURRENT USE OF INSULIN: Chronic | ICD-10-CM

## 2025-05-06 LAB
BACTERIA UR QL AUTO: ABNORMAL /HPF
BASOPHILS # BLD AUTO: 0.06 10*3/MM3 (ref 0–0.2)
BASOPHILS NFR BLD AUTO: 0.5 % (ref 0–1.5)
BILIRUB UR QL STRIP: NEGATIVE
CLARITY UR: ABNORMAL
COLOR UR: YELLOW
DEPRECATED RDW RBC AUTO: 48.6 FL (ref 37–54)
EOSINOPHIL # BLD AUTO: 0.54 10*3/MM3 (ref 0–0.4)
EOSINOPHIL NFR BLD AUTO: 4.5 % (ref 0.3–6.2)
ERYTHROCYTE [DISTWIDTH] IN BLOOD BY AUTOMATED COUNT: 14.4 % (ref 12.3–15.4)
GLUCOSE UR STRIP-MCNC: ABNORMAL MG/DL
GRAN CASTS URNS QL MICRO: ABNORMAL /LPF
HCT VFR BLD AUTO: 31.8 % (ref 37.5–51)
HGB BLD-MCNC: 9.6 G/DL (ref 13–17.7)
HGB UR QL STRIP.AUTO: ABNORMAL
IMM GRANULOCYTES # BLD AUTO: 0.02 10*3/MM3 (ref 0–0.05)
IMM GRANULOCYTES NFR BLD AUTO: 0.2 % (ref 0–0.5)
KETONES UR QL STRIP: NEGATIVE
LEUKOCYTE ESTERASE UR QL STRIP.AUTO: NEGATIVE
LYMPHOCYTES # BLD AUTO: 1.9 10*3/MM3 (ref 0.7–3.1)
LYMPHOCYTES NFR BLD AUTO: 15.7 % (ref 19.6–45.3)
MCH RBC QN AUTO: 27.6 PG (ref 26.6–33)
MCHC RBC AUTO-ENTMCNC: 30.2 G/DL (ref 31.5–35.7)
MCV RBC AUTO: 91.4 FL (ref 79–97)
MONOCYTES # BLD AUTO: 0.93 10*3/MM3 (ref 0.1–0.9)
MONOCYTES NFR BLD AUTO: 7.7 % (ref 5–12)
NEUTROPHILS NFR BLD AUTO: 71.4 % (ref 42.7–76)
NEUTROPHILS NFR BLD AUTO: 8.62 10*3/MM3 (ref 1.7–7)
NITRITE UR QL STRIP: NEGATIVE
PH UR STRIP.AUTO: 6 [PH] (ref 5–8)
PLATELET # BLD AUTO: 427 10*3/MM3 (ref 140–450)
PMV BLD AUTO: 8.8 FL (ref 6–12)
PROT UR QL STRIP: ABNORMAL
RBC # BLD AUTO: 3.48 10*6/MM3 (ref 4.14–5.8)
RBC # UR STRIP: ABNORMAL /HPF
REF LAB TEST METHOD: ABNORMAL
SP GR UR STRIP: 1.02 (ref 1–1.03)
SQUAMOUS #/AREA URNS HPF: ABNORMAL /HPF
UROBILINOGEN UR QL STRIP: ABNORMAL
WBC # UR STRIP: ABNORMAL /HPF
WBC NRBC COR # BLD AUTO: 12.07 10*3/MM3 (ref 3.4–10.8)

## 2025-05-06 PROCEDURE — 81001 URINALYSIS AUTO W/SCOPE: CPT | Performed by: FAMILY MEDICINE

## 2025-05-06 PROCEDURE — 80053 COMPREHEN METABOLIC PANEL: CPT

## 2025-05-06 PROCEDURE — 87086 URINE CULTURE/COLONY COUNT: CPT | Performed by: FAMILY MEDICINE

## 2025-05-06 PROCEDURE — 85025 COMPLETE CBC W/AUTO DIFF WBC: CPT

## 2025-05-06 PROCEDURE — 36415 COLL VENOUS BLD VENIPUNCTURE: CPT

## 2025-05-06 PROCEDURE — 83540 ASSAY OF IRON: CPT

## 2025-05-06 PROCEDURE — 84466 ASSAY OF TRANSFERRIN: CPT

## 2025-05-06 RX ORDER — HYDROCHLOROTHIAZIDE 12.5 MG/1
CAPSULE ORAL
COMMUNITY
Start: 2025-04-07 | End: 2025-05-07 | Stop reason: SDUPTHER

## 2025-05-06 RX ORDER — SILVER SULFADIAZINE 10 MG/G
CREAM TOPICAL
COMMUNITY

## 2025-05-06 RX ORDER — TOBRAMYCIN INHALATION SOLUTION 300 MG/5ML
INHALANT RESPIRATORY (INHALATION)
COMMUNITY
Start: 2025-04-25

## 2025-05-06 RX ORDER — ASCORBIC ACID 500 MG
500 TABLET ORAL 2 TIMES DAILY
Qty: 60 TABLET | Refills: 5 | Status: SHIPPED | OUTPATIENT
Start: 2025-05-06

## 2025-05-06 RX ORDER — ATORVASTATIN CALCIUM 20 MG/1
20 TABLET, FILM COATED ORAL
Qty: 30 TABLET | Refills: 5 | Status: SHIPPED | OUTPATIENT
Start: 2025-05-06

## 2025-05-06 RX ORDER — DOCUSATE SODIUM 100 MG/1
100 CAPSULE, LIQUID FILLED ORAL 2 TIMES DAILY
Qty: 60 CAPSULE | Refills: 5 | Status: SHIPPED | OUTPATIENT
Start: 2025-05-06

## 2025-05-06 RX ORDER — LANOLIN ALCOHOL/MO/W.PET/CERES
1 CREAM (GRAM) TOPICAL
Qty: 30 TABLET | Refills: 5 | Status: SHIPPED | OUTPATIENT
Start: 2025-05-06

## 2025-05-06 RX ORDER — MONTELUKAST SODIUM 10 MG/1
10 TABLET ORAL NIGHTLY
Qty: 30 TABLET | Refills: 5 | Status: SHIPPED | OUTPATIENT
Start: 2025-05-06

## 2025-05-06 RX ORDER — FLUTICASONE PROPIONATE AND SALMETEROL 500; 50 UG/1; UG/1
1 POWDER RESPIRATORY (INHALATION)
COMMUNITY
Start: 2024-12-02 | End: 2025-05-06

## 2025-05-06 RX ORDER — GABAPENTIN 300 MG/1
300 CAPSULE ORAL 3 TIMES DAILY
COMMUNITY
Start: 2024-12-02 | End: 2025-05-06

## 2025-05-06 RX ORDER — FERROUS GLUCONATE 324(38)MG
324 TABLET ORAL EVERY MORNING
Qty: 30 TABLET | Refills: 5 | Status: SHIPPED | OUTPATIENT
Start: 2025-05-06

## 2025-05-06 RX ORDER — IBUPROFEN 200 MG
950 CAPSULE ORAL
Qty: 30 TABLET | Refills: 5 | Status: SHIPPED | OUTPATIENT
Start: 2025-05-06

## 2025-05-06 RX ORDER — BUSPIRONE HYDROCHLORIDE 15 MG/1
15 TABLET ORAL 2 TIMES DAILY
Qty: 60 TABLET | Refills: 5 | Status: SHIPPED | OUTPATIENT
Start: 2025-05-06

## 2025-05-06 RX ORDER — DULOXETIN HYDROCHLORIDE 30 MG/1
30 CAPSULE, DELAYED RELEASE ORAL DAILY
Qty: 30 CAPSULE | Refills: 5 | Status: SHIPPED | OUTPATIENT
Start: 2025-05-06

## 2025-05-06 RX ORDER — OMEPRAZOLE 40 MG/1
40 CAPSULE, DELAYED RELEASE ORAL DAILY
COMMUNITY
Start: 2024-12-02 | End: 2025-05-06

## 2025-05-06 RX ORDER — CHOLECALCIFEROL (VITAMIN D3) 50 MCG
1 TABLET ORAL EVERY MORNING
Qty: 30 TABLET | Refills: 5 | Status: SHIPPED | OUTPATIENT
Start: 2025-05-06

## 2025-05-06 RX ORDER — HYDRALAZINE HYDROCHLORIDE 50 MG/1
50 TABLET, FILM COATED ORAL 3 TIMES DAILY
COMMUNITY
Start: 2024-12-02 | End: 2025-05-06

## 2025-05-06 RX ORDER — FERROUS SULFATE 324(65)MG
TABLET, DELAYED RELEASE (ENTERIC COATED) ORAL
COMMUNITY
Start: 2025-02-26 | End: 2025-05-06 | Stop reason: SDUPTHER

## 2025-05-06 RX ORDER — FOLIC ACID 1 MG/1
1000 TABLET ORAL
Qty: 30 TABLET | Refills: 5 | Status: SHIPPED | OUTPATIENT
Start: 2025-05-06

## 2025-05-06 RX ORDER — PANTOPRAZOLE SODIUM 40 MG/1
40 TABLET, DELAYED RELEASE ORAL
Qty: 30 TABLET | Refills: 5 | Status: SHIPPED | OUTPATIENT
Start: 2025-05-06

## 2025-05-06 RX ORDER — SEMAGLUTIDE 1.34 MG/ML
1 INJECTION, SOLUTION SUBCUTANEOUS WEEKLY
Qty: 3 ML | Refills: 5 | Status: SHIPPED | OUTPATIENT
Start: 2025-05-06

## 2025-05-06 RX ORDER — FINASTERIDE 5 MG/1
5 TABLET, FILM COATED ORAL
Qty: 30 TABLET | Refills: 5 | Status: SHIPPED | OUTPATIENT
Start: 2025-05-06

## 2025-05-06 RX ORDER — POTASSIUM CHLORIDE 750 MG/1
10 TABLET, EXTENDED RELEASE ORAL DAILY
COMMUNITY
Start: 2024-12-02 | End: 2025-05-06

## 2025-05-06 RX ORDER — DAPAGLIFLOZIN 5 MG/1
5 TABLET, FILM COATED ORAL EVERY MORNING
Qty: 30 TABLET | Refills: 5 | Status: SHIPPED | OUTPATIENT
Start: 2025-05-06

## 2025-05-06 RX ORDER — DILTIAZEM HYDROCHLORIDE 240 MG/1
240 CAPSULE, COATED, EXTENDED RELEASE ORAL EVERY MORNING
Qty: 30 CAPSULE | Refills: 5 | Status: SHIPPED | OUTPATIENT
Start: 2025-05-06

## 2025-05-06 RX ORDER — TAMSULOSIN HYDROCHLORIDE 0.4 MG/1
1 CAPSULE ORAL
Qty: 30 CAPSULE | Refills: 5 | Status: SHIPPED | OUTPATIENT
Start: 2025-05-06

## 2025-05-06 RX ORDER — ZINC SULFATE 50(220)MG
220 CAPSULE ORAL EVERY MORNING
Qty: 30 CAPSULE | Refills: 5 | Status: SHIPPED | OUTPATIENT
Start: 2025-05-06

## 2025-05-06 RX ORDER — METFORMIN HYDROCHLORIDE 500 MG/1
500 TABLET, EXTENDED RELEASE ORAL
COMMUNITY
Start: 2024-12-02 | End: 2025-05-06

## 2025-05-06 RX ORDER — ASPIRIN 81 MG/1
81 TABLET ORAL EVERY MORNING
Qty: 30 TABLET | Refills: 5 | Status: SHIPPED | OUTPATIENT
Start: 2025-05-06

## 2025-05-06 NOTE — ASSESSMENT & PLAN NOTE
He is to avoid NSAIDs and push fluids 64 ounces a day    Orders:    Comprehensive metabolic panel; Future

## 2025-05-06 NOTE — ASSESSMENT & PLAN NOTE
Orders:    atorvastatin (LIPITOR) 20 MG tablet; Take 1 tablet by mouth every night at bedtime.

## 2025-05-06 NOTE — ASSESSMENT & PLAN NOTE
Current treatment includes low-cholesterol diet and atorvastatin 20 mg nightly, he tolerates med well.  Labs reviewed and he is under good control

## 2025-05-06 NOTE — ASSESSMENT & PLAN NOTE
Overall stable and well-controlled on current treatment plan.  Continue current medications.  He needs his eye exam once a year.  Currently getting wound care for diabetic foot ulcer

## 2025-05-06 NOTE — ASSESSMENT & PLAN NOTE
He is in normal sinus rhythm today.  He is to follow-up with cardiology as directed and continue Cardizem and Eliquis as directed  Orders:    aspirin (Aspirin Low Dose) 81 MG EC tablet; Take 1 tablet by mouth Every Morning.    apixaban (Eliquis) 5 MG tablet tablet; Take 1 tablet by mouth Every 12 (Twelve) Hours.    dilTIAZem CD (CARDIZEM CD) 240 MG 24 hr capsule; Take 1 capsule by mouth Every Morning.

## 2025-05-06 NOTE — ASSESSMENT & PLAN NOTE
Currently on iron supplementation.  Will check labs to see if his iron levels have improved  Orders:    CBC w AUTO Differential; Future    Iron Profile; Future

## 2025-05-06 NOTE — ASSESSMENT & PLAN NOTE
Borderline.  No changes in current meds or treatment plan.  He should minimize sodium in his diet.  Encouraged ongoing weight loss with the Ozempic

## 2025-05-06 NOTE — ASSESSMENT & PLAN NOTE
Orders:    finasteride (PROSCAR) 5 MG tablet; Take 1 tablet by mouth every night at bedtime.    tamsulosin (FLOMAX) 0.4 MG capsule 24 hr capsule; Take 1 capsule by mouth every night at bedtime.

## 2025-05-06 NOTE — ASSESSMENT & PLAN NOTE
Stable on tamsulosin and Proscar.  No changes in current treatment plan.  Permanent catheter in place

## 2025-05-06 NOTE — ASSESSMENT & PLAN NOTE
He needs to be on a CPAP machine and he did not tolerate can this is been removed from his home.  He understands the risk of not treating sleep apnea.  He is working on weight loss and Ozempic is helping.  He is unable to exercise.  Encourage healthy diet

## 2025-05-06 NOTE — ASSESSMENT & PLAN NOTE
GERD is stable on famotidine and Protonix and he tolerates meds well.  He is to avoid acidic and spicy foods in his diet

## 2025-05-06 NOTE — ASSESSMENT & PLAN NOTE
Orders:    ferrous gluconate (FERGON) 324 MG tablet; Take 1 tablet by mouth Every Morning.    Multiple Vitamins-Iron (Multi-Vitamin/Iron) tablet; Take 1 tablet by mouth Every Morning.

## 2025-05-06 NOTE — ASSESSMENT & PLAN NOTE
Currently on vitamin D supplementation.  Will check labs to see if his vitamin D labs have improved  Orders:    Cholecalciferol (Vitamin D3) 50 MCG (2000 UT) tablet; Take 1 tablet by mouth Every Morning.

## 2025-05-07 ENCOUNTER — PATIENT OUTREACH (OUTPATIENT)
Dept: CASE MANAGEMENT | Facility: OTHER | Age: 60
End: 2025-05-07
Payer: COMMERCIAL

## 2025-05-07 ENCOUNTER — TELEPHONE (OUTPATIENT)
Dept: CASE MANAGEMENT | Facility: OTHER | Age: 60
End: 2025-05-07
Payer: COMMERCIAL

## 2025-05-07 DIAGNOSIS — E11.65 UNCONTROLLED TYPE 2 DIABETES MELLITUS WITH HYPERGLYCEMIA: Primary | ICD-10-CM

## 2025-05-07 DIAGNOSIS — N18.31 STAGE 3A CHRONIC KIDNEY DISEASE: Primary | ICD-10-CM

## 2025-05-07 LAB
ALBUMIN SERPL-MCNC: 3.1 G/DL (ref 3.5–5.2)
ALBUMIN/GLOB SERPL: 0.7 G/DL
ALP SERPL-CCNC: 220 U/L (ref 39–117)
ALT SERPL W P-5'-P-CCNC: 26 U/L (ref 1–41)
ANION GAP SERPL CALCULATED.3IONS-SCNC: 15.6 MMOL/L (ref 5–15)
AST SERPL-CCNC: 55 U/L (ref 1–40)
BILIRUB SERPL-MCNC: <0.2 MG/DL (ref 0–1.2)
BUN SERPL-MCNC: 25 MG/DL (ref 6–20)
BUN/CREAT SERPL: 11.4 (ref 7–25)
CALCIUM SPEC-SCNC: 9.4 MG/DL (ref 8.6–10.5)
CHLORIDE SERPL-SCNC: 94 MMOL/L (ref 98–107)
CO2 SERPL-SCNC: 27.4 MMOL/L (ref 22–29)
CREAT SERPL-MCNC: 2.19 MG/DL (ref 0.76–1.27)
EGFRCR SERPLBLD CKD-EPI 2021: 33.8 ML/MIN/1.73
GLOBULIN UR ELPH-MCNC: 4.6 GM/DL
GLUCOSE SERPL-MCNC: 492 MG/DL (ref 65–99)
IRON 24H UR-MRATE: 50 MCG/DL (ref 59–158)
IRON SATN MFR SERPL: 24 % (ref 20–50)
POTASSIUM SERPL-SCNC: 4 MMOL/L (ref 3.5–5.2)
PROT SERPL-MCNC: 7.7 G/DL (ref 6–8.5)
SODIUM SERPL-SCNC: 137 MMOL/L (ref 136–145)
TIBC SERPL-MCNC: 204 MCG/DL (ref 298–536)
TRANSFERRIN SERPL-MCNC: 137 MG/DL (ref 200–360)

## 2025-05-07 RX ORDER — HYDROCHLOROTHIAZIDE 12.5 MG/1
CAPSULE ORAL
Qty: 2 EACH | Refills: 5 | Status: SHIPPED | OUTPATIENT
Start: 2025-05-07

## 2025-05-07 NOTE — OUTREACH NOTE
AMBULATORY CASE MANAGEMENT NOTE    Names and Relationships of Patient/Support Persons: Contact: Kami Wallis; Relationship: Emergency Contact -     Assistance needed with medication planning.  Discharged from hospital recently.  Reconcilled discharge plan with chart and medication brought into office.    Missing 4 prescriptions.  Elizabeth will stop by drug store and add to existing 17 days I could put in planners.      Added urine culture per PCP orders.  It did not reflex to culture.   Tara GOMEZ  Ambulatory Case Management    5/7/2025, 08:15 EDT

## 2025-05-07 NOTE — TELEPHONE ENCOUNTER
"Lab called with a critical value:  Glucose 492 ( he had cereal for breakfast) -  \"he also had not taken any medicine the night before and that morning\" per wife when called about lab value  "

## 2025-05-08 LAB — BACTERIA SPEC AEROBE CULT: NO GROWTH

## 2025-05-16 ENCOUNTER — TELEPHONE (OUTPATIENT)
Dept: UROLOGY | Age: 60
End: 2025-05-16
Payer: COMMERCIAL

## 2025-05-16 NOTE — TELEPHONE ENCOUNTER
Caller: Kami Wallis    Relationship to patient: Emergency Contact    Best call back number: [phone] (home)       Patient is needing: WE ATTEMPTED TO REQUEST ANOTHER REFILL OF CATHETER SUPPLIES HOWEVER LIBERATOR SUPPLY STATED THAT THE ORDER IS NO LONGER ACTIVE. HE HAS MORE THAN 3 DAY SUPPLY BUT WILL RUN OUT.

## 2025-05-19 ENCOUNTER — OFFICE VISIT (OUTPATIENT)
Dept: WOUND CARE | Facility: HOSPITAL | Age: 60
End: 2025-05-19
Payer: COMMERCIAL

## 2025-05-19 VITALS
DIASTOLIC BLOOD PRESSURE: 48 MMHG | HEART RATE: 78 BPM | TEMPERATURE: 97.5 F | SYSTOLIC BLOOD PRESSURE: 154 MMHG | RESPIRATION RATE: 18 BRPM

## 2025-05-19 DIAGNOSIS — E11.622 TYPE 2 DIABETES MELLITUS WITH OTHER SKIN ULCER, WITH LONG-TERM CURRENT USE OF INSULIN: ICD-10-CM

## 2025-05-19 DIAGNOSIS — Z99.81 CHRONIC RESPIRATORY FAILURE WITH HYPOXIA, ON HOME O2 THERAPY: ICD-10-CM

## 2025-05-19 DIAGNOSIS — I50.32 CHRONIC DIASTOLIC HEART FAILURE: ICD-10-CM

## 2025-05-19 DIAGNOSIS — E11.621 TYPE 2 DIABETES MELLITUS WITH FOOT ULCER, WITH LONG-TERM CURRENT USE OF INSULIN: ICD-10-CM

## 2025-05-19 DIAGNOSIS — J96.11 CHRONIC RESPIRATORY FAILURE WITH HYPOXIA, ON HOME O2 THERAPY: ICD-10-CM

## 2025-05-19 DIAGNOSIS — L97.509 TYPE 2 DIABETES MELLITUS WITH FOOT ULCER, WITH LONG-TERM CURRENT USE OF INSULIN: ICD-10-CM

## 2025-05-19 DIAGNOSIS — Z98.890 PERIPHERAL ARTERIAL DISEASE WITH HISTORY OF REVASCULARIZATION: ICD-10-CM

## 2025-05-19 DIAGNOSIS — L89.623 PRESSURE INJURY OF LEFT HEEL, STAGE 3: Primary | ICD-10-CM

## 2025-05-19 DIAGNOSIS — L89.152 PRESSURE INJURY OF SACRAL REGION, STAGE 2: ICD-10-CM

## 2025-05-19 DIAGNOSIS — Z79.4 TYPE 2 DIABETES MELLITUS WITH FOOT ULCER, WITH LONG-TERM CURRENT USE OF INSULIN: ICD-10-CM

## 2025-05-19 DIAGNOSIS — I73.9 PERIPHERAL ARTERIAL DISEASE WITH HISTORY OF REVASCULARIZATION: ICD-10-CM

## 2025-05-19 DIAGNOSIS — Z79.4 TYPE 2 DIABETES MELLITUS WITH OTHER SKIN ULCER, WITH LONG-TERM CURRENT USE OF INSULIN: ICD-10-CM

## 2025-05-19 PROCEDURE — G0463 HOSPITAL OUTPT CLINIC VISIT: HCPCS | Performed by: NURSE PRACTITIONER

## 2025-05-19 PROCEDURE — 99214 OFFICE O/P EST MOD 30 MIN: CPT | Performed by: EMERGENCY MEDICINE

## 2025-05-19 PROCEDURE — 3077F SYST BP >= 140 MM HG: CPT | Performed by: EMERGENCY MEDICINE

## 2025-05-19 PROCEDURE — 1159F MED LIST DOCD IN RCRD: CPT | Performed by: EMERGENCY MEDICINE

## 2025-05-19 PROCEDURE — 3078F DIAST BP <80 MM HG: CPT | Performed by: EMERGENCY MEDICINE

## 2025-05-19 PROCEDURE — 1160F RVW MEDS BY RX/DR IN RCRD: CPT | Performed by: EMERGENCY MEDICINE

## 2025-05-19 NOTE — PROGRESS NOTES
Chief Complaint  Wound Check (Follow up coccyx and left heel wounds.  Home health assessing weekly and wife changing all other days.  Santyl to left heel and silver to coccyx wound. )    Subjective      History of Present Illness    Preston Wallis  is a 59 y.o. male     History of Present Illness  The patient is a 59-year-old male here for a recheck of sacral and left heel pressure wounds.  He is accompanied by his wife who does his dressing changes.    He has been applying Santyl to the left heel and collagen with silver covered by Aquacel Ag to the sacral region daily. The wound on his sacral region exhibits a tendency to bleed during home cleaning, which could be attributed to his anticoagulant therapy. He reports no pain in the affected areas. He receives weekly visits from home health services, primarily for wound care, which they find beneficial. However, he expresses dissatisfaction with the timeliness of supply delivery and the accuracy of the supplies provided, often necessitating out-of-pocket purchases.    He also reports experiencing neuropathy, resulting in cold feet and severe pain if they are not kept covered and warm.    MEDICATIONS  Current: Santyl, Aquacel Ag    Allergies:  Benadryl [diphenhydramine] and Proventil [albuterol]      Current Outpatient Medications:     apixaban (Eliquis) 5 MG tablet tablet, Take 1 tablet by mouth Every 12 (Twelve) Hours., Disp: 60 tablet, Rfl: 5    arformoterol (BROVANA) 15 MCG/2ML nebulizer solution, Take 2 mL by nebulization 2 (Two) Times a Day., Disp: 360 mL, Rfl: 3    aspirin (Aspirin Low Dose) 81 MG EC tablet, Take 1 tablet by mouth Every Morning., Disp: 30 tablet, Rfl: 5    atorvastatin (LIPITOR) 20 MG tablet, Take 1 tablet by mouth every night at bedtime., Disp: 30 tablet, Rfl: 5    budesonide (Pulmicort) 0.5 MG/2ML nebulizer solution, Take 2 mL by nebulization 2 (Two) Times a Day., Disp: 360 mL, Rfl: 3    busPIRone (BUSPAR) 15 MG tablet, Take 1 tablet by mouth 2  (Two) Times a Day., Disp: 60 tablet, Rfl: 5    calcium citrate (CALCITRATE) 950 (200 Ca) MG tablet, Take 1 tablet by mouth every night at bedtime., Disp: 30 tablet, Rfl: 5    Cholecalciferol (Vitamin D3) 50 MCG (2000 UT) tablet, Take 1 tablet by mouth Every Morning., Disp: 30 tablet, Rfl: 5    collagenase (Santyl) 250 UNIT/GM ointment, Apply 1 Application topically to the appropriate area as directed Daily., Disp: 30 g, Rfl: 1    Continuous Glucose Sensor (FreeStyle Bethany 3 Plus Sensor), Use Every 15 (Fifteen) Days., Disp: 2 each, Rfl: 5    dapagliflozin (Farxiga) 5 MG tablet tablet, Take 1 tablet by mouth Every Morning., Disp: 30 tablet, Rfl: 5    dilTIAZem CD (CARDIZEM CD) 240 MG 24 hr capsule, Take 1 capsule by mouth Every Morning., Disp: 30 capsule, Rfl: 5    docusate sodium (COLACE) 100 MG capsule, Take 1 capsule by mouth 2 (Two) Times a Day., Disp: 60 capsule, Rfl: 5    DULoxetine (Cymbalta) 30 MG capsule, Take 1 capsule by mouth Daily., Disp: 30 capsule, Rfl: 5    ferrous gluconate (FERGON) 324 MG tablet, Take 1 tablet by mouth Every Morning., Disp: 30 tablet, Rfl: 5    finasteride (PROSCAR) 5 MG tablet, Take 1 tablet by mouth every night at bedtime., Disp: 30 tablet, Rfl: 5    folic acid (FOLVITE) 1 MG tablet, Take 1 tablet by mouth every night at bedtime., Disp: 30 tablet, Rfl: 5    Insulin Glargine (BASAGLAR KWIKPEN) 100 UNIT/ML injection pen, Inject 15 Units under the skin into the appropriate area as directed Daily for 180 days., Disp: 15 mL, Rfl: 1    Insulin Lispro, 1 Unit Dial, (HUMALOG) 100 UNIT/ML solution pen-injector, Inject 5 Units under the skin into the appropriate area as directed 3 (Three) Times a Day Before Meals., Disp: 15 mL, Rfl: 0    ipratropium-albuterol (DUO-NEB) 0.5-2.5 mg/3 ml nebulizer, Take 3 mL by nebulization Every 4 (Four) Hours As Needed for Wheezing or Shortness of Air., Disp: 360 mL, Rfl: 5    Magnesium Oxide -Mg Supplement 400 (240 Mg) MG tablet, Take 1 tablet by mouth every  night at bedtime., Disp: 30 tablet, Rfl: 5    Menthol-Zinc Oxide 0.44-20.6 % ointment, Apply 1 Application topically to the appropriate area as directed 2 (Two) Times a Day. With calamine, i.e. Calmoseptine, Disp: 113 g, Rfl: 1    montelukast (SINGULAIR) 10 MG tablet, Take 1 tablet by mouth Every Night., Disp: 30 tablet, Rfl: 5    Multiple Vitamins-Iron (Multi-Vitamin/Iron) tablet, Take 1 tablet by mouth Every Morning., Disp: 30 each, Rfl: 5    Nutritional Supplements (Rosalino Nutrivigor) pack, Take 1 packet by mouth 2 (Two) Times a Day., Disp: 180 each, Rfl: 1    ondansetron ODT (ZOFRAN-ODT) 4 MG disintegrating tablet, Place 1 tablet on the tongue Every 8 (Eight) Hours As Needed for Nausea or Vomiting., Disp: 20 tablet, Rfl: 0    pantoprazole (PROTONIX) 40 MG EC tablet, Take 1 tablet by mouth Every Morning., Disp: 30 tablet, Rfl: 5    Povidone-Iodine (Betadine) 5 % external solution 5%, Apply 1 mL topically to the appropriate area as directed 2 (Two) Times a Day. Left Heel, Disp: , Rfl:     Semaglutide, 1 MG/DOSE, (Ozempic, 1 MG/DOSE,) 4 MG/3ML solution pen-injector, Inject 1 mg under the skin into the appropriate area as directed 1 (One) Time Per Week., Disp: 3 mL, Rfl: 5    silver sulfadiazine (SILVADENE, SSD) 1 % cream, Apply  topically to the appropriate area as directed., Disp: , Rfl:     sodium hypochlorite (DAKIN'S 1/4 STRENGTH) 0.125 % solution topical solution 0.125%, Apply 10 mL topically to the appropriate area as directed 2 (Two) Times a Day., Disp: 1000 mL, Rfl: 1    tamsulosin (FLOMAX) 0.4 MG capsule 24 hr capsule, Take 1 capsule by mouth every night at bedtime., Disp: 30 capsule, Rfl: 5    tobramycin PF (MOIZ) 300 MG/5ML nebulizer solution, nebulize 1 vial BY MOUTH TWICE DAILY FOR 28 DAYS ON AND 28 DAYS OFF, Disp: , Rfl:     vitamin C (ASCORBIC ACID) 500 MG tablet, Take 1 tablet by mouth 2 (Two) Times a Day., Disp: 60 tablet, Rfl: 5    zinc sulfate (ZINCATE) 220 (50 Zn) MG capsule, Take 1 capsule by  "mouth Every Morning., Disp: 30 capsule, Rfl: 5    Past Medical History:   Diagnosis Date    1. Incision and drainage of posterior perianal abscess 2. Sharp excisional debridement through skin and subcutaneous tissue in the posterior perianal area, 9 x 6 x 1 cm 01/26/2025    Age-related cognitive decline     Allergic contact dermatitis     Allergies     Anemia     Asthma     Bedbound     11/2023 \"MY LEG MUSCLES STOPPED WORKING\"    Bronchiectasis with acute lower respiratory infection     Charcot foot due to diabetes mellitus 09/10/2013    Chronic diastolic (congestive) heart failure     Chronic kidney disease     Chronic respiratory failure with hypoxia     Closed supracondylar fracture of femur 01/12/2022    COPD (chronic obstructive pulmonary disease)     Deep vein thrombosis (DVT) of lower extremity associated with air travel 01/13/2023    Dependence on supplemental oxygen     Eczema     Elevated cholesterol     Erectile dysfunction     due to organic reasons    Essential (primary) hypertension     Fracture     closed fracture of other tarsal and metatarsal bones    Fracture of proximal humerus 01/13/2023    GERD without esophagitis     High risk medication use     Hypercholesteremia     Hypomagnesemia     Infected stasis ulcer of left lower extremity 01/13/2023    Insomnia     Low back pain     Major depressive disorder     Morbid (severe) obesity due to excess calories     MRSA pneumonia     Muscle weakness     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 without evidence of necrosis     Obstructive sleep apnea (adult) (pediatric)     On home O2     REPORTS WEARING 2L/NC AAT    Other forms of dyspnea     Other long term (current) drug therapy     Other specified noninfective gastroenteritis and colitis     Other spondylosis, lumbar region     Pain in both knees     Paroxysmal atrial fibrillation     Peripheral neuropathy     attributed to type 2 diabetes    Pneumonia, " "unspecified organism     Polyneuropathy     Rash and other nonspecific skin eruption     Self-catheterizes urinary bladder     EVERY 4 HOURS    Smoking     \"SOMETIMES\"    Syncope and collapse     Tachycardia     Tinnitus 01/13/2023    Type 1 diabetes mellitus with diabetic chronic kidney disease     Type 2 diabetes mellitus     Unspecified fall, initial encounter     Urinary retention      Past Surgical History:   Procedure Laterality Date    BRONCHOSCOPY N/A 1/28/2025    Procedure: BRONCHOSCOPY: BAL: insertion of lighted instrument to view inside the lung;  Surgeon: Walter Nicole DO;  Location: [facility] MAIN OR;  Service: Pulmonary;  Laterality: N/A;    BRONCHOSCOPY N/A 2/3/2025    Procedure: BRONCHOSCOPY WITH BRONCHOALVEOLAR LAVAGE, POSSIBLE BIOPSY, BRUSHING, WASHING, AIRWAY INSPECTION: insertion of lighted instrument to view inside the lung;  Surgeon: Chadd Swan MD;  Location: [facility] MAIN OR;  Service: Pulmonary;  Laterality: N/A;    CARDIAC CATHETERIZATION Left 8/15/2024    Procedure: Carbon dioxide aortogram with left leg angiogram, possible angioplasty or stenting;  Surgeon: Moshe Willson MD;  Location: [facility] CATH INVASIVE LOCATION;  Service: Vascular;  Laterality: Left;    CHOLECYSTECTOMY      COLOSTOMY N/A 2/6/2025    Procedure: COLOSTOMY LAPAROSCOPIC; plain text: creation of colostomy using small incisions;  Surgeon: Emerson Canada MD;  Location: [facility] OR [facility];  Service: General;  Laterality: N/A;    CYSTOSCOPY      ENDOSCOPY N/A 3/18/2025    Procedure: ESOPHAGOGASTRODUODENOSCOPY WITH BIOPSIES;  Surgeon: Rafael Hernandez MD;  Location: [facility] ENDOSCOPY;  Service: Gastroenterology;  Laterality: N/A;  HIATAL HERNIA, REFLUX ESOPHAGITIS    FEMUR SURGERY Left     Shravan placed    INCISION AND DRAINAGE ABSCESS N/A 1/26/2025    Procedure: INCISION AND DRAINAGE ABSCESS; plain text: incision and drain pus from buttocks wound;  Surgeon: Emerson Canada MD;  Location: [facility] OR [facility];  Service: " General;  Laterality: N/A;    KNEE SURGERY Left     OTHER SURGICAL HISTORY Left     venous port, REMOVED    PORTACATH PLACEMENT Right     TIBIAL PLATEAU OPEN REDUCTION INTERNAL FIXATION Left 2023    Procedure: TIBIAL PLATEAU OPEN REDUCTION INTERNAL FIXATION;  Surgeon: Hugo Kline MD;  Location: [facility] OR;  Service: Orthopedics;  Laterality: Left;    TONSILLECTOMY AND ADENOIDECTOMY       Social History     Socioeconomic History    Marital status:    Tobacco Use    Smoking status: Former     Current packs/day: 0.00     Average packs/day: 1 pack/day for 12.0 years (12.0 ttl pk-yrs)     Types: Cigarettes     Start date:      Quit date:      Years since quittin.4     Passive exposure: Past    Smokeless tobacco: Never   Vaping Use    Vaping status: Never Used   Substance and Sexual Activity    Alcohol use: Not Currently    Drug use: Never    Sexual activity: Defer           Objective     Vitals:    25 1124   BP: 154/48   BP Location: Left arm   Patient Position: Lying   Cuff Size: Large Adult   Pulse: 78   Resp: 18  Comment: O2 sat 97 w/ 2liters   Temp: 97.5 °F (36.4 °C)   PainSc: 0-No pain     There is no height or weight on file to calculate BMI.    STEADI Fall Risk Assessment has not been completed.     Review of Systems     ROS:  Per HPI.     I have reviewed the HPI and ROS as documented by MA/RN. Katerin Torres MD    Physical Exam     NAD  AAOx3, pleasant, cooperative      Physical Exam  The patient's two sacral/coccyx wounds are significantly smaller. The more inferior wound is the larger of the two and there is some hypergranulation tissue and tissue that is growing above the level of the skin. Tissue is slightly friable and bleeds easily, silver nitrate applied today. Left heel wound is also continuing to get smaller and base exhibits healthy granulation tissue with minimal slough. Decreased periwound maceration today.               Result Review :  The following  data was reviewed by: Katerin Torres MD on 05/19/2025:    Prior notes and images.               Assessment and Plan   Diagnoses and all orders for this visit:    1. Pressure injury of left heel, stage 3 (Primary)    2. Pressure injury of sacral region, stage 2  -     Menthol-Zinc Oxide 0.44-20.6 % ointment; Apply 1 Application topically to the appropriate area as directed 2 (Two) Times a Day. With calamine, i.e. Calmoseptine  Dispense: 113 g; Refill: 1    3. Type 2 diabetes mellitus with foot ulcer, with long-term current use of insulin    4. Chronic diastolic heart failure    5. Chronic respiratory failure with hypoxia, on home O2 therapy    6. Peripheral arterial disease with history of revascularization    7. Type 2 diabetes mellitus with other skin ulcer, with long-term current use of insulin        Assessment & Plan  1. Sacral pressure wounds.  The sacral pressure wounds have shown significant improvement, with the upper wound being particularly small. The lower wound, although slightly larger, is also healing well. There is a presence of hypergranulation tissue, which is characterized by red, beefy, healthy tissue extending beyond the wound edges instead of remaining confined to the base. This type of tissue can occasionally hinder the wound's ability to close completely. The application of silver nitrate, a form of chemical cauterization, may aid in reducing the hypergranulation tissue, aligning it with the wound edges and facilitating complete closure. The current treatment plan involves the application of collagen with silver, followed by Aquacel Ag on both wounds. The use of Santyl will be discontinued for now due to the minimal presence of slough. If the collagen does not dissolve effectively as the sacral/coccyx wounds continue to reduce in size, a piece of Aquacel Ag can be applied over it. Once the wounds are nearly healed and significantly reduced in size, Calmoseptine can be used. A prescription for  Calmoseptine will be sent to the pharmacy, and a few packets will be provided for home use. Silver nitrate will be applied to the lower wound on the sacral region to manage the hypergranulation tissue. If any issues arise before the next scheduled visit, he is advised to contact us via message or phone call for an earlier appointment.    2. Left heel pressure wound.  The left heel pressure wound is also showing improvement, with healthy granulation tissue present and minimal slough. The current treatment plan involves switching from Santyl to collagen with silver, followed by Aquacel Ag. This change is due to the minimal presence of slough and the overall healthy appearance of the wound.      Follow-up  The patient is scheduled for a follow-up visit in 4 weeks.    PROCEDURE  Silver nitrate was applied to the lower wound on the sacral region to manage the hypergranulation tissue and easy bleeding.      Patient was given instructions and counseling regarding their condition or for health maintenance advice, as well as the wound care plan and recommendations. They understand and agree with the plan.  They will follow back up here in the clinic but are instructed to contact us in the interim should they have any new, returning, or worsening symptoms or concerns. Please see specific information pulled into the AVS if appropriate.     Dragon Dictation utilized for chart completion.    I spent 32 minutes in both face-to-face and nonface-to-face time for patient care today including, but not limited to, review of old records, reviewing old images, history and physical exam, reviewing test results, counseling patient and family, entering orders, coordinating care, and documenting.      Follow Up   Return in about 4 weeks (around 6/16/2025).      Katerin Torres MD    Patient or patient representative verbalized consent for the use of Ambient Listening during the visit with  Katerin Torres MD for chart documentation.  5/19/2025  13:23 EDT

## 2025-05-19 NOTE — TELEPHONE ENCOUNTER
Patient was supposed to have been sent to [facility] not sure where that went because there is nothing scanned into the chart

## 2025-05-19 NOTE — TELEPHONE ENCOUNTER
I spoke with [facility] Rep Coleman and he states that he spoke with the patient's wife last week in regards to this and he had plenty. He is going to reach out to them again, because he thinks this is related to the ostomy orders not urology orders.

## 2025-05-22 ENCOUNTER — PATIENT OUTREACH (OUTPATIENT)
Dept: CASE MANAGEMENT | Facility: OTHER | Age: 60
End: 2025-05-22
Payer: COMMERCIAL

## 2025-05-22 DIAGNOSIS — E11.65 TYPE 2 DIABETES MELLITUS WITH HYPERGLYCEMIA, WITH LONG-TERM CURRENT USE OF INSULIN: Primary | ICD-10-CM

## 2025-05-22 DIAGNOSIS — Z79.4 TYPE 2 DIABETES MELLITUS WITH HYPERGLYCEMIA, WITH LONG-TERM CURRENT USE OF INSULIN: Primary | ICD-10-CM

## 2025-05-22 NOTE — OUTREACH NOTE
AMBULATORY CASE MANAGEMENT NOTE    Names and Relationships of Patient/Support Persons: Contact: Kami Wallis; Relationship: Emergency Contact -     Elizabeth called to state that Gómez is having a terrible time with his neuropathy in his feet.  Keeping him up at night and not sleeping.    His gabapentin had been stopped a while ago.  On duloxetine 30mg.  Any suggestions?    Tara R  Ambulatory Case Management    5/22/2025, 12:17 EDT    PCP agreeable to send in topical, patient agreeable.

## 2025-05-23 ENCOUNTER — PATIENT OUTREACH (OUTPATIENT)
Dept: CASE MANAGEMENT | Facility: OTHER | Age: 60
End: 2025-05-23
Payer: COMMERCIAL

## 2025-05-23 DIAGNOSIS — E11.65 TYPE 2 DIABETES MELLITUS WITH HYPERGLYCEMIA, WITH LONG-TERM CURRENT USE OF INSULIN: Primary | ICD-10-CM

## 2025-05-23 DIAGNOSIS — Z79.4 TYPE 2 DIABETES MELLITUS WITH HYPERGLYCEMIA, WITH LONG-TERM CURRENT USE OF INSULIN: Primary | ICD-10-CM

## 2025-05-23 NOTE — OUTREACH NOTE
AMBULATORY CASE MANAGEMENT NOTE    Names and Relationships of Patient/Support Persons: Contact: Kami Wallis; Relationship: Emergency Contact -     Elizabeth came in and together we were able to put in 2 weeks of Gómez's medications in planner.  Spoke with Harinder hernandez [facility] to let him know not to fill his medications before June 5 due to I will be out of the office early July.  Informed Elizabeth that home health can always help with medications also if not available.      Tara GOMEZ  Ambulatory Case Management    5/23/2025, 10:12 EDT

## 2025-05-30 ENCOUNTER — PATIENT OUTREACH (OUTPATIENT)
Dept: CASE MANAGEMENT | Facility: OTHER | Age: 60
End: 2025-05-30
Payer: COMMERCIAL

## 2025-05-30 ENCOUNTER — TELEMEDICINE (OUTPATIENT)
Dept: FAMILY MEDICINE CLINIC | Age: 60
End: 2025-05-30
Payer: COMMERCIAL

## 2025-05-30 DIAGNOSIS — Z79.4 TYPE 2 DIABETES MELLITUS WITH HYPERGLYCEMIA, WITH LONG-TERM CURRENT USE OF INSULIN: Primary | ICD-10-CM

## 2025-05-30 DIAGNOSIS — N18.4 CHRONIC KIDNEY DISEASE, STAGE IV (SEVERE): ICD-10-CM

## 2025-05-30 DIAGNOSIS — J44.1 CHRONIC OBSTRUCTIVE PULMONARY DISEASE WITH ACUTE EXACERBATION: ICD-10-CM

## 2025-05-30 DIAGNOSIS — Z87.09 HISTORY OF COPD: ICD-10-CM

## 2025-05-30 DIAGNOSIS — N18.31 STAGE 3A CHRONIC KIDNEY DISEASE: ICD-10-CM

## 2025-05-30 DIAGNOSIS — E11.65 TYPE 2 DIABETES MELLITUS WITH HYPERGLYCEMIA, WITH LONG-TERM CURRENT USE OF INSULIN: Primary | ICD-10-CM

## 2025-05-30 DIAGNOSIS — Z87.01 HISTORY OF PNEUMONIA: ICD-10-CM

## 2025-05-30 DIAGNOSIS — R05.8 PRODUCTIVE COUGH: Primary | ICD-10-CM

## 2025-05-30 NOTE — OUTREACH NOTE
AMBULATORY CASE MANAGEMENT NOTE    Names and Relationships of Patient/Support Persons: Contact: Home health; Relationship:  -     Home health called stating Gómez was having crackles and green sputum.   Scheduled appointment with [facility] for possible treatment.      Tara GOMEZ  Ambulatory Case Management    5/30/2025, 11:56 EDT

## 2025-05-30 NOTE — PROGRESS NOTES
Chief Complaint  Cough (productive cough crackles on right side upper and lower, left clear, green sputum , no fever, per home health nurse.)    Subjective      Preston Wallis is a 59 y.o. male who presents to [facility]     History of Present Illness  The patient presents via virtual visit for evaluation of lung congestion.    He reports a persistent cough accompanied by thick green phlegm production. He has been experiencing shortness of breath, which is a chronic condition for him and has not worsened. He has been utilizing his regular respiratory medications, including tobramycin, which have provided some relief. He reports a history of pneumonia. He has previously tolerated Augmentin without any adverse reactions. Patient was assessed today by his home health nurse who reported crackles to right lung throughout. She reported that he completed a tobramycin neb treatment while she was in the home.     Due to lack of transportation patient was unable to be seen in the office today. Home health provided services in the patients home today.     MEDICATIONS  Tobramycin         Objective   Vital Signs:   There were no vitals filed for this visit.  There is no height or weight on file to calculate BMI.    Wt Readings from Last 3 Encounters:   04/24/25 104 kg (230 lb 6.1 oz)   04/17/25 103 kg (227 lb)   03/31/25 103 kg (227 lb)     BP Readings from Last 3 Encounters:   05/19/25 154/48   05/06/25 145/67   04/28/25 148/77       Health Maintenance   Topic Date Due    URINE MICROALBUMIN-CREATININE RATIO (uACR)  07/27/2022    DIABETIC EYE EXAM  11/01/2024    INFLUENZA VACCINE  07/01/2025    ANNUAL PHYSICAL  07/11/2025    HEMOGLOBIN A1C  09/14/2025    DIABETIC FOOT EXAM  10/22/2025    LIPID PANEL  01/25/2026    COLORECTAL CANCER SCREENING  09/08/2027    TDAP/TD VACCINES (2 - Td or Tdap) 09/18/2028    HEPATITIS C SCREENING  Completed    COVID-19 Vaccine  Completed    Pneumococcal Vaccine 50+   "Completed    ZOSTER VACCINE  Completed    Hepatitis B  Discontinued       CT Abdomen Pelvis Without Contrast  Addendum Date: 4/23/2025  ADDENDUM #1 Please ignore the erroneous stated \"hysterectomy\" for reproductive section. Patient state no acute process. Electronically Signed: Rob Milner MD  4/23/2025 3:19 PM EDT  Workstation ID: [id number] ORIGINAL REPORT: CT ABDOMEN PELVIS WO CONTRAST Date of Exam: 4/22/2025 2:52 PM EDT Indication: Eval abdominal pain and vomiting with elevated white blood cell count, history of chronic kidney disease. Comparison: 3/16/2025 Technique: Axial CT images were obtained of the abdomen and pelvis without the administration of contrast. Reconstructed coronal and sagittal images were also obtained. Automated exposure control and iterative construction methods were used. Findings: LUNG BASES: Similar lower lung cylindrical bronchiectasis with cystic areas. Similar few tree-in-bud opacities in the left lower lobe. LIVER:  Unremarkable parenchyma without focal lesion. BILIARY/GALLBLADDER: Cholecystectomy SPLEEN:  Unremarkable PANCREAS:  Unremarkable ADRENAL:  Unremarkable KIDNEYS: Renal cortical atrophy with no solid mass identified. No obstruction.  No calculus identified. GASTROINTESTINAL/MESENTERY: There is a small sliding hiatal hernia. There is a left lower quadrant colostomy. No evidence of obstruction nor inflammation.  AORTA/IVC:  Normal caliber. RETROPERITONEUM/LYMPH NODES:  Unremarkable REPRODUCTIVE: Hysterectomy BLADDER:  Unremarkable OSSEUS STRUCTURES:  Typical for age with no acute process identified. Impression: 1.No acute findings in the abdomen or pelvis. 2.Similar lower lung cylindrical bronchiectasis with cystic areas. Similar few tree-in-bud opacities in the left lower lobe. 3.Small sliding hiatal hernia. 4.Left lower quadrant colostomy. No evidence of obstruction nor inflammation. Electronically Signed: Rob Milner MD  4/22/2025 3:48 PM EDT  Workstation ID: " [id number]    Result Date: 4/23/2025  Impression: 1.No acute findings in the abdomen or pelvis. 2.Similar lower lung cylindrical bronchiectasis with cystic areas. Similar few tree-in-bud opacities in the left lower lobe. 3.Small sliding hiatal hernia. 4.Left lower quadrant colostomy. No evidence of obstruction nor inflammation. Electronically Signed: Rob Milner MD  4/22/2025 3:48 PM EDT  Workstation ID: [id number]    CT Chest Without Contrast Diagnostic  Result Date: 4/22/2025  Impression: 1.Severe bronchiectasis centrally in both lungs. 2.Improved right lower lobe infiltrate compared with the patient's last study. 3.No new infiltrates are identified. 4.Small nodular densities peripherally in the lungs likely related to mucous plugging. 5.Trace pericardial fluid, decreased compared with the last study. 6.Coronary artery atherosclerotic calcifications. Electronically Signed: Iam Hope MD  4/22/2025 3:37 PM EDT  Workstation ID: [id number]    XR Chest 1 View  Result Date: 4/22/2025  Impression: 1.Small pleural effusions, right greater than left. 2.Background of chronic scarring and bronchiectasis with volume loss on the right. It would be difficult to rule out a superimposed infiltrate/pneumonia given the background of chronic lung disease. Electronically Signed: Deshawn Soto MD  4/22/2025 2:23 PM EDT  Workstation ID: [id number]    CT Abdomen Pelvis Without Contrast  Result Date: 3/16/2025  Impression: No acute findings in the abdomen/pelvis. Status post diverting colostomy. Mild fat stranding at the colostomy, likely postsurgical change. No evidence of bowel obstruction. Interval umbilical hernia repair with mild amount of underlying fat stranding/inflammatory change, favored postsurgical. Mildly decreased tree-in-bud nodular opacities in the left lower lobe since 1/24/2025, though these are partially imaged and would recommend referring to prior chest CT report for further recommendations. Electronically Signed:  Farshad Soriano  3/16/2025 3:55 PM EDT  Workstation ID: [id number]    XR Chest 1 View  Result Date: 3/16/2025  Impression: 1.Coarse bilateral interstitial markings, which could reflect interstitial pulmonary edema or atypical pneumonia. 2.Small right pleural effusion. Electronically Signed: Fransico Truong MD  3/16/2025 3:32 PM EDT  Workstation ID: [id number]    XR Chest 1 View  Result Date: 2/5/2025  Impression: 1.Bilateral upper lobe airspace opacities corresponding to areas of bronchiectasis seen on the prior examination. 2.Right basilar airspace disease which may represent atelectasis or pneumonia. 3.Trace bilateral pleural effusions. 4.Cardiomegaly. Electronically Signed: Mynor Singleton  2/5/2025 7:47 AM EST  Workstation ID: [id number]       Physical Exam     Physical Exam         Result Review :  The following data was reviewed by: CON Mckeon on 05/30/2025:         Procedures          ASSESSMENT/PLAN  Diagnoses and all orders for this visit:    1. Productive cough (Primary)  -     amoxicillin-clavulanate (AUGMENTIN) 875-125 MG per tablet; Take 1 tablet by mouth 2 (Two) Times a Day for 10 days.  Dispense: 20 tablet; Refill: 0    2. History of pneumonia  -     amoxicillin-clavulanate (AUGMENTIN) 875-125 MG per tablet; Take 1 tablet by mouth 2 (Two) Times a Day for 10 days.  Dispense: 20 tablet; Refill: 0        Assessment & Plan  1. Lung congestion.  He reports persistent shortness of breath and phlegm production, which are consistent with his chronic symptoms other than the change in sputum production. . He has been using his normal breathing treatments, including tobramycin, which have provided some relief. A prescription for Augmentin will be sent to his pharmacy. If there is no improvement in his condition within the next 48 - 72 hours he will need further evaluation. If symptoms worsen and you become increasingly short of breath you should seek treatment at the emergency department. Due to inability  to complete physical assessment patient is being treated with Amoxicillin with hopes of covering him for possible COPD exacerbation or pneumonia. Patient is diabetic so will avoid steroids at this time.                Preston Wallis  reports that he quit smoking about 32 years ago. His smoking use included cigarettes. He started smoking about 44 years ago. He has a 12 pack-year smoking history. He has been exposed to tobacco smoke. He has never used smokeless tobacco.           FOLLOW UP  No follow-ups on file.  Patient was given instructions and counseling regarding his condition or for health maintenance advice. Please see specific information pulled into the AVS if appropriate.       CON Mckeon  05/30/25  15:26 EDT    Patient or patient representative verbalized consent for the use of Ambient Listening during the visit with  CON Mckeon for chart documentation. 5/30/2025  15:30 EDT

## 2025-05-30 NOTE — OUTREACH NOTE
[facility] End of Month Documentation    This Chronic Medical Management Care Plan for Preston Wallis, 59 y.o. male, has been monitored and managed; reviewed and a new plan of care implemented for the month of May.  A cumulative time of 67  minutes was spent on this patient record this month, including phone call with care giver; electronic communication with primary care provider; electronic communication with pharmacist; chart review.    Regarding the patient's problems: has Chronic cough; Polyneuropathy; Paroxysmal atrial fibrillation; Obstructive sleep apnea; Insomnia; Allergies; COPD exacerbation; Chronic anticoagulation; Benign prostatic hyperplasia; Difficulty using continuous positive airway pressure (CPAP) device; Stage 3a chronic kidney disease; Iron deficiency anemia secondary to inadequate dietary iron intake; Vitamin D deficiency; Lower extremity edema; Venous insufficiency (chronic) (peripheral); Chronic dyspnea; Gastroesophageal reflux disease; Altered mental status; Hypertensive heart disease with chronic diastolic congestive heart failure; Diabetic neuropathy; Hyperlipidemia; Luetscher's syndrome; Neurogenic bladder; Pneumonia due to Pseudomonas species; Seizures; Chronic obstructive pulmonary disease; Essential hypertension; Chronic pain of left knee; Bronchiectasis with acute exacerbation; Bacterial pneumonia; Anemia; Closed fracture of left tibial plateau; Left foot pain; Chronic respiratory failure with hypoxia and hypercapnia; Impaired cognition; Atherosclerosis of native arteries of the extremities with ulceration; Chronic kidney disease, stage IV (severe); Alcoholic cirrhosis; Allergic rhinitis; Major depressive disorder, single episode, in partial remission; Oxygen dependent; Type 2 diabetes mellitus with diabetic peripheral angiopathy without gangrene, with long-term current use of insulin; and Nausea and vomiting on their problem list., the following items were addressed: medications; transitions  to medical care; medical records; referrals to community service providers and any changes can be found within the plan section of the note.  A detailed listing of time spent for chronic care management is tracked within each outreach encounter.  Current medications include:  has a current medication list which includes the following prescription(s): apixaban, arformoterol, aspirin, atorvastatin, budesonide, buspirone, calcium citrate, vitamin d3, santyl, freestyle clau 3 plus sensor, dapagliflozin, diltiazem cd, docusate sodium, duloxetine, ferrous gluconate, finasteride, folic acid, basaglar kwikpen, insulin lispro (1 unit dial), ipratropium-albuterol, magnesium oxide -mg supplement, menthol-zinc oxide, montelukast, multi-vitamin/iron, arlen nutrivigor, ondansetron odt, pantoprazole, betadine, ozempic (1 mg/dose), silver sulfadiazine, sodium hypochlorite, tamsulosin, tobramycin pf, vitamin c, and zinc sulfate. and the patient is reported to be caregiver will take responsibility for med compliance; patient is compliant with medication protocol,  Medications are reported to be non-effective in controlling symptoms and changes have been made to the medication protocol.  Regarding these diagnoses, referrals were made to the following provider(s):  specialists.  All notes on chart for PCP to review.    The patient was monitored remotely for pain; medications; blood glucose; mood & behavior; activity level.    The patient's physical needs include:  help taking medications as prescribed; medication education; needs assistance with ADLs; resources for disability needs; DME supplies.     The patient's mental support needs include:  continued support    The patient's cognitive support needs include:  medication; continued support; needs assistance with ADLs; health care    The patient's psychosocial support needs include:  continued support; coordination of community providers; medication management or adherence    The  patient's functional needs include: health care coverage; medication education; needs assistance for ADLs; physical healthcare; physician referral; resources for disability needs    The patient's environmental needs include:  resources for disability needs; no involvement in outside activities or no access to other activities    Care Plan overall comments:  Still not at goal, however improving. will continue to follow. Has many upcoming appointments and referrals to specialists. A1c not at goal, continuing ways to improve with addition of new medications. Will follow, new hospitalization.    Refer to previous outreach notes for more information on the areas listed above.    Monthly Billing Diagnoses  (E11.65,  Z79.4) Type 2 diabetes mellitus with hyperglycemia, with long-term current use of insulin    (N18.31) Stage 3a chronic kidney disease    (J44.1) Chronic obstructive pulmonary disease with acute exacerbation    (N18.4) Chronic kidney disease, stage IV (severe)    Medications   Medications have been reconciled    Care Plan progress this month:      Recently Modified Care Plans Updates made since 4/29/2025 12:00 AM      No recently modified care plans.             Chronic Kidney       Track and Manage My Symptoms (Not Progressing)       Start:  12/06/24    Expected End:  06/06/25         My Symptom Management To Do List       Start:  12/06/24         Why is this important?  Keeping track of symptoms can help you feel the best. It also helps the doctor stay on top of any changes to the disease. It may also help keep your disease from getting worse. Taking simple steps can help you cope with  symptoms like feeling very tired or itchy skin.              Follow My Treatment Plan (Progressing)       Start:  12/06/24    Expected End:  06/06/25         My Treatment Plan To Do List       Start:  12/06/24         Why is this important?  Staying as healthy as you can is very important. This may mean making changes if you  smoke, don't exercise or eat poorly.  A healthy lifestyle is an important goal for you.  Following the treatment plan and making changes may be hard.  Try some of these steps to help keep the disease from getting worse.              Manage My Diet (Not Progressing)       Start:  12/06/24    Expected End:  06/06/25         My Diet Management To Do List       Start:  12/06/24         Why is this important?  A healthy diet is important for mental and physical health.  Healthy food helps repair damaged body tissue and maintains strong bones and muscles.  No single food is just right so eating a variety of proteins, fruits, vegetables and grains is best.  You may need to change what you eat or drink to manage kidney disease.  A dietitian is the best person to guide you.              Optimal Care Coordination of a Patient Experiencing Chronic Kidney Disease (Progressing)       Start:  12/06/24    Expected End:  06/06/25         Support Psychological Response to Chronic Kidney Disease       Start:  12/06/24          Initial    Support Psychological Response to Chronic Kidney Disease: caregiver stress acknowledged;caregiver support provided;goal setting facilitated;positive reinforcement provided;self-care encouraged taken at 12/06/24         Facilitate Activities to Optimize Comfort and Well-Being       Start:  12/06/24          Initial    Facilitate Activities to Optimize Comfort and Well-Being: alternating periods of activity with rest promoted;sleep-hygiene practices encouraged taken at 12/06/24         Alleviate Barriers to Chronic Kidney Disease Treatment       Start:  12/06/24          Initial    Alleviate Barriers to Chronic Kidney Disease Treatment: medication side effects managed;barriers to treatment adherence identified;activity or exercise based on tolerance encouraged Reached out to nephrology office to review labs and any interventions needed.  Sent abnormal labs to oncall provider and pcp. taken at 12/06/24          Maintain or Improve Strength or Functional Ability       Start:  12/06/24          Initial    Maintain or Improve Strength or Functional Ability: activity or exercise based on tolerance encouraged;assistive or adaptive device use encouraged taken at 12/06/24         Alleviate Barriers to Optimal Nutrition Status       Start:  12/06/24          Initial    Alleviate Barriers to Optimal Nutrition Status: diversity of foods encouraged;support and encouragement provided taken at 12/06/24         Facilitate Multi-Modal Pain Management       Start:  12/06/24          Initial    Facilitate Multi-Modal Pain Management: pain assessed taken at 12/06/24         Develop and Maintain Palliative Care Plan       Start:  12/06/24                Current Specialty Plan of Care Status signed by both patient and provider    Instructions   Patient was provided an electronic copy of care plan  CCM services were explained and offered and patient has accepted these services.  Patient has given their written consent to receive CCM services and understands that this includes the authorization of electronic communication of medical information with the other treating providers.  Patient understands that they may stop CCM services at any time and these changes will be effective at the end of the calendar month and will effectively revocate the agreement of CCM services.  Patient understands that only one practitioner can furnish and be paid for CCM services during one calendar month.  Patient also understands that there may be co-payment or deductible fees in association with CCM services.  Patient will continue with at least monthly follow-up calls with the Ambulatory .    Tara GOMEZ  Ambulatory Case Management    5/30/2025, 11:58 EDT

## 2025-06-02 ENCOUNTER — PATIENT OUTREACH (OUTPATIENT)
Dept: CASE MANAGEMENT | Facility: OTHER | Age: 60
End: 2025-06-02
Payer: COMMERCIAL

## 2025-06-02 DIAGNOSIS — N18.31 STAGE 3A CHRONIC KIDNEY DISEASE: Primary | ICD-10-CM

## 2025-06-02 NOTE — OUTREACH NOTE
AMBULATORY CASE MANAGEMENT NOTE    Names and Relationships of Patient/Support Persons: Contact: Kami Wallis; Relationship: Emergency Contact -     Elizabeth called about his colostomy supplies.  Called [employer]/to check status and they are waiting on office notes in order to ship.    Called office together and they needed a corrected order.  Spoke with navigator at urology office and discussed, she will correct.    Will need to decide who is going to manage the supplies.  Elizabeth asked if PCP could - if not he needs to follow up with gen surg.   He also does not have a pcp follow up.    Spoke with PCP and she recommends following up with general surgery for him to look at and be sure that it is still healthy.  Elizabeth notified.     Tara GOMEZ  Ambulatory Case Management    6/2/2025, 14:27 EDT

## 2025-06-03 ENCOUNTER — OUTSIDE FACILITY SERVICE (OUTPATIENT)
Dept: FAMILY MEDICINE CLINIC | Age: 60
End: 2025-06-03
Payer: COMMERCIAL

## 2025-06-06 ENCOUNTER — OFFICE VISIT (OUTPATIENT)
Dept: SURGERY | Facility: CLINIC | Age: 60
End: 2025-06-06
Payer: COMMERCIAL

## 2025-06-06 VITALS
BODY MASS INDEX: 34.07 KG/M2 | DIASTOLIC BLOOD PRESSURE: 84 MMHG | SYSTOLIC BLOOD PRESSURE: 165 MMHG | HEIGHT: 69 IN | HEART RATE: 89 BPM | WEIGHT: 230 LBS

## 2025-06-06 DIAGNOSIS — Z43.3 COLOSTOMY, EVALUATE: Primary | ICD-10-CM

## 2025-06-06 RX ORDER — AMANTADINE HYDROCHLORIDE 100 MG/1
TABLET ORAL
COMMUNITY
Start: 2025-06-05

## 2025-06-06 NOTE — PROGRESS NOTES
"Chief Complaint  Follow-up and Colostomy    Subjective    Subjective     Preston Wallis is a 59 y.o. male who presents to [facility] GENERAL SUR[facility]ERY    History of Present Illness  59-year-old male with a history of a sacral decubitus ulcer requiring fecal diversion with minimally invasive loop colostomy earlier this year presents today for routine follow-up.  Patient states he is currently tolerating his diet and having normal colostomy output.  His wound on his sacrum has essentially healed completely and he denies any further issues with this.  He remains wheelchair-bound as he is nonweightbearing to the left lower extremity.      Personal History     Social History     Tobacco Use    Smoking status: Former     Current packs/day: 0.00     Average packs/day: 1 pack/day for 12.0 years (12.0 ttl pk-yrs)     Types: Cigarettes     Start date:      Quit date:      Years since quittin.4     Passive exposure: Past    Smokeless tobacco: Never   Vaping Use    Vaping status: Never Used   Substance Use Topics    Alcohol use: Not Currently    Drug use: Never     Allergies   Allergen Reactions    Benadryl [Diphenhydramine] Itching    Proventil [Albuterol] Other (See Comments)     Mouth sores         Objective    Objective   Vital Signs:   /84   Pulse 89   Ht 175.3 cm (69.02\")   Wt 104 kg (230 lb)   BMI 33.95 kg/m²       Physical Exam  Constitutional:       Appearance: Normal appearance.   HENT:      Head: Normocephalic and atraumatic.      Mouth/Throat:      Mouth: Mucous membranes are moist.      Pharynx: Oropharynx is clear.   Cardiovascular:      Rate and Rhythm: Normal rate and regular rhythm.   Pulmonary:      Effort: Pulmonary effort is normal. No respiratory distress.   Abdominal:      General: There is no distension.      Palpations: Abdomen is soft.      Tenderness: There is no abdominal tenderness.      Comments: Left-sided loop colostomy pink/patent/productive   Musculoskeletal: "         General: No swelling. Normal range of motion.      Cervical back: Normal range of motion and neck supple.   Skin:     General: Skin is warm and dry.   Neurological:      General: No focal deficit present.      Mental Status: He is alert and oriented to person, place, and time.   Psychiatric:         Mood and Affect: Mood normal.         Behavior: Behavior normal.                  Assessment / Plan      Diagnoses and all orders for this visit:    1. Colostomy, evaluate (Primary)    59-year-old male status post laparoscopic loop colostomy for fecal diversion.  Overall doing well with no issues at this time.  Will need to be weightbearing as tolerated on the bilateral lower extremities and not bedbound/wheelchair-bound before consideration of ostomy reversal.  Plan for home health physical therapy assessment in the coming weeks.  He may follow-up with me in 6 months to assess his functional status for possible colostomy reversal.    Follow Up   Return in about 6 months (around 12/6/2025).      Patient was given instructions and counseling regarding his condition or for health maintenance advice. Please see specific information pulled into the AVS if appropriate.     Electronically signed by Emerson Canada MD, 06/06/25, 2:17 PM EDT.

## 2025-06-09 ENCOUNTER — TELEPHONE (OUTPATIENT)
Dept: SURGERY | Facility: CLINIC | Age: 60
End: 2025-06-09

## 2025-06-09 NOTE — TELEPHONE ENCOUNTER
Caller:  Kami Wallis     Relationship:  WIFE     Best call back number: [phone]     What is your medical concern?  PATIENT WANTED TO INFORM OFFICE THAT [employer] WILL BE REACHING OUT TO THE OFFICE TO REQUEST INFORMATION FOR PATIENTS MEDICAL SUPPLIES

## 2025-06-19 ENCOUNTER — OFFICE VISIT (OUTPATIENT)
Dept: GASTROENTEROLOGY | Facility: CLINIC | Age: 60
End: 2025-06-19
Payer: COMMERCIAL

## 2025-06-19 VITALS
HEIGHT: 69 IN | BODY MASS INDEX: 33.95 KG/M2 | HEART RATE: 89 BPM | SYSTOLIC BLOOD PRESSURE: 162 MMHG | DIASTOLIC BLOOD PRESSURE: 63 MMHG

## 2025-06-19 DIAGNOSIS — K20.80 LOS ANGELES GRADE C ESOPHAGITIS: Primary | ICD-10-CM

## 2025-06-19 PROCEDURE — 3077F SYST BP >= 140 MM HG: CPT

## 2025-06-19 PROCEDURE — 1159F MED LIST DOCD IN RCRD: CPT

## 2025-06-19 PROCEDURE — 1160F RVW MEDS BY RX/DR IN RCRD: CPT

## 2025-06-19 PROCEDURE — 99214 OFFICE O/P EST MOD 30 MIN: CPT

## 2025-06-19 PROCEDURE — 3078F DIAST BP <80 MM HG: CPT

## 2025-06-19 NOTE — PROGRESS NOTES
"Chief Complaint  Heartburn, Nausea, and Constipation (Pt is having more formed output than his normal in the colostomy bag )    Preston Wallis is a 59 y.o. male who presents to [facility] [place] for hospital follow up.     History of present Illness  Admitted to [facility] 3/16/25 for coffee ground emesis.   EGD 3/18/25 by  - Grade C esophagitits, medium hiatal hernia, normal stomach and duodenum. Esophageal biopsies - intestinal metaplasia.   Discharged with Protonix 40mg.     Patient presents to the office for hospital follow up. Patient has a history of colostomy for fecal diversion due to sacral wound, option for possible take down once mobility improves. He continue Protonix 40mg daily which adequately controls reflux symptoms. Denies nausea, vomiting, abdominal pain, and dysphagia. Denies lower GI symptoms - having adequate output via colostomy. Denies melena and hematochezia.     Past Medical History:   Diagnosis Date    1. Incision and drainage of posterior perianal abscess 2. Sharp excisional debridement through skin and subcutaneous tissue in the posterior perianal area, 9 x 6 x 1 cm 01/26/2025    Age-related cognitive decline     Allergic contact dermatitis     Allergies     Anemia     Asthma     Bedbound     11/2023 \"MY LEG MUSCLES STOPPED WORKING\"    Bronchiectasis with acute lower respiratory infection     Charcot foot due to diabetes mellitus 09/10/2013    Chronic diastolic (congestive) heart failure     Chronic kidney disease     Chronic respiratory failure with hypoxia     Closed supracondylar fracture of femur 01/12/2022    COPD (chronic obstructive pulmonary disease)     Deep vein thrombosis (DVT) of lower extremity associated with air travel 01/13/2023    Dependence on supplemental oxygen     Eczema     Elevated cholesterol     Erectile dysfunction     due to organic reasons    Essential (primary) hypertension     Fracture     closed fracture of other tarsal " "and metatarsal bones    Fracture of proximal humerus 01/13/2023    GERD without esophagitis     High risk medication use     Hypercholesteremia     Hypomagnesemia     Infected stasis ulcer of left lower extremity 01/13/2023    Insomnia     Low back pain     Major depressive disorder     Morbid (severe) obesity due to excess calories     MRSA pneumonia     Muscle weakness     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 without evidence of necrosis     Obstructive sleep apnea (adult) (pediatric)     On home O2     REPORTS WEARING 2L/NC AAT    Other forms of dyspnea     Other long term (current) drug therapy     Other specified noninfective gastroenteritis and colitis     Other spondylosis, lumbar region     Pain in both knees     Paroxysmal atrial fibrillation     Peripheral neuropathy     attributed to type 2 diabetes    Pneumonia, unspecified organism     Polyneuropathy     Rash and other nonspecific skin eruption     Self-catheterizes urinary bladder     EVERY 4 HOURS    Smoking     \"SOMETIMES\"    Syncope and collapse     Tachycardia     Tinnitus 01/13/2023    Type 1 diabetes mellitus with diabetic chronic kidney disease     Type 2 diabetes mellitus     Unspecified fall, initial encounter     Urinary retention        Past Surgical History:   Procedure Laterality Date    BRONCHOSCOPY N/A 1/28/2025    Procedure: BRONCHOSCOPY: BAL: insertion of lighted instrument to view inside the lung;  Surgeon: Walter Nicole DO;  Location: [facility] OR;  Service: Pulmonary;  Laterality: N/A;    BRONCHOSCOPY N/A 2/3/2025    Procedure: BRONCHOSCOPY WITH BRONCHOALVEOLAR LAVAGE, POSSIBLE BIOPSY, BRUSHING, WASHING, AIRWAY INSPECTION: insertion of lighted instrument to view inside the lung;  Surgeon: Chadd Swan MD;  Location: [facility] OR;  Service: Pulmonary;  Laterality: N/A;    CARDIAC CATHETERIZATION Left 8/15/2024    Procedure: Carbon dioxide aortogram with left leg " angiogram, possible angioplasty or stenting;  Surgeon: Moshe Willson MD;  Location: [facility] CATH INVASIVE LOCATION;  Service: Vascular;  Laterality: Left;    CHOLECYSTECTOMY      COLOSTOMY N/A 2/6/2025    Procedure: COLOSTOMY LAPAROSCOPIC; plain text: creation of colostomy using small incisions;  Surgeon: Emerson Canada MD;  Location: [facility] OR OSC;  Service: General;  Laterality: N/A;    CYSTOSCOPY      ENDOSCOPY N/A 3/18/2025    Procedure: ESOPHAGOGASTRODUODENOSCOPY WITH BIOPSIES;  Surgeon: Rafael Hernandez MD;  Location: [facility] ENDOSCOPY;  Service: Gastroenterology;  Laterality: N/A;  HIATAL HERNIA, REFLUX ESOPHAGITIS    FEMUR SURGERY Left     Shravan placed    INCISION AND DRAINAGE ABSCESS N/A 1/26/2025    Procedure: INCISION AND DRAINAGE ABSCESS; plain text: incision and drain pus from buttocks wound;  Surgeon: Emerson Canada MD;  Location: [facility] OR [facility];  Service: General;  Laterality: N/A;    KNEE SURGERY Left     OTHER SURGICAL HISTORY Left     venous port, REMOVED    PORTACATH PLACEMENT Right     TIBIAL PLATEAU OPEN REDUCTION INTERNAL FIXATION Left 12/22/2023    Procedure: TIBIAL PLATEAU OPEN REDUCTION INTERNAL FIXATION;  Surgeon: Hugo Kline MD;  Location: [facility] OR;  Service: Orthopedics;  Laterality: Left;    TONSILLECTOMY AND ADENOIDECTOMY           Current Outpatient Medications:     amantadine (SYMMETREL) 100 MG tablet, , Disp: , Rfl:     apixaban (Eliquis) 5 MG tablet tablet, Take 1 tablet by mouth Every 12 (Twelve) Hours., Disp: 60 tablet, Rfl: 5    arformoterol (BROVANA) 15 MCG/2ML nebulizer solution, Take 2 mL by nebulization 2 (Two) Times a Day., Disp: 360 mL, Rfl: 3    aspirin (Aspirin Low Dose) 81 MG EC tablet, Take 1 tablet by mouth Every Morning., Disp: 30 tablet, Rfl: 5    atorvastatin (LIPITOR) 20 MG tablet, Take 1 tablet by mouth every night at bedtime., Disp: 30 tablet, Rfl: 5    budesonide (Pulmicort) 0.5 MG/2ML nebulizer solution, Take 2 mL by nebulization 2 (Two)  Times a Day., Disp: 360 mL, Rfl: 3    busPIRone (BUSPAR) 15 MG tablet, Take 1 tablet by mouth 2 (Two) Times a Day., Disp: 60 tablet, Rfl: 5    calcium citrate (CALCITRATE) 950 (200 Ca) MG tablet, Take 1 tablet by mouth every night at bedtime., Disp: 30 tablet, Rfl: 5    Cholecalciferol (Vitamin D3) 50 MCG (2000 UT) tablet, Take 1 tablet by mouth Every Morning., Disp: 30 tablet, Rfl: 5    collagenase (Santyl) 250 UNIT/GM ointment, Apply 1 Application topically to the appropriate area as directed Daily., Disp: 30 g, Rfl: 1    Continuous Glucose Sensor (FreeStyle [place] 3 Plus Sensor), Use Every 15 (Fifteen) Days., Disp: 2 each, Rfl: 5    dapagliflozin (Farxiga) 5 MG tablet tablet, Take 1 tablet by mouth Every Morning., Disp: 30 tablet, Rfl: 5    dilTIAZem CD (CARDIZEM CD) 240 MG 24 hr capsule, Take 1 capsule by mouth Every Morning., Disp: 30 capsule, Rfl: 5    docusate sodium (COLACE) 100 MG capsule, Take 1 capsule by mouth 2 (Two) Times a Day., Disp: 60 capsule, Rfl: 5    DULoxetine (Cymbalta) 30 MG capsule, Take 1 capsule by mouth Daily., Disp: 30 capsule, Rfl: 5    ferrous gluconate (FERGON) 324 MG tablet, Take 1 tablet by mouth Every Morning., Disp: 30 tablet, Rfl: 5    finasteride (PROSCAR) 5 MG tablet, Take 1 tablet by mouth every night at bedtime., Disp: 30 tablet, Rfl: 5    folic acid (FOLVITE) 1 MG tablet, Take 1 tablet by mouth every night at bedtime., Disp: 30 tablet, Rfl: 5    Insulin Glargine (BASAGLAR KWIKPEN) 100 UNIT/ML injection pen, Inject 15 Units under the skin into the appropriate area as directed Daily for 180 days., Disp: 15 mL, Rfl: 1    Insulin Lispro, 1 Unit Dial, (HUMALOG) 100 UNIT/ML solution pen-injector, Inject 5 Units under the skin into the appropriate area as directed 3 (Three) Times a Day Before Meals., Disp: 15 mL, Rfl: 0    ipratropium-albuterol (DUO-NEB) 0.5-2.5 mg/3 ml nebulizer, Take 3 mL by nebulization Every 4 (Four) Hours As Needed for Wheezing or Shortness of Air., Disp: 360  mL, Rfl: 5    Magnesium Oxide -Mg Supplement 400 (240 Mg) MG tablet, Take 1 tablet by mouth every night at bedtime., Disp: 30 tablet, Rfl: 5    Menthol-Zinc Oxide 0.44-20.6 % ointment, Apply 1 Application topically to the appropriate area as directed 2 (Two) Times a Day. With calamine, i.e. Calmoseptine, Disp: 113 g, Rfl: 1    montelukast (SINGULAIR) 10 MG tablet, Take 1 tablet by mouth Every Night., Disp: 30 tablet, Rfl: 5    Multiple Vitamins-Iron (Multi-Vitamin/Iron) tablet, Take 1 tablet by mouth Every Morning., Disp: 30 each, Rfl: 5    Nutritional Supplements (Rosalino Nutrivigor) pack, Take 1 packet by mouth 2 (Two) Times a Day., Disp: 180 each, Rfl: 1    ondansetron ODT (ZOFRAN-ODT) 4 MG disintegrating tablet, Place 1 tablet on the tongue Every 8 (Eight) Hours As Needed for Nausea or Vomiting., Disp: 20 tablet, Rfl: 0    pantoprazole (PROTONIX) 40 MG EC tablet, Take 1 tablet by mouth Every Morning., Disp: 30 tablet, Rfl: 5    Povidone-Iodine (Betadine) 5 % external solution 5%, Apply 1 mL topically to the appropriate area as directed 2 (Two) Times a Day. Left Heel, Disp: , Rfl:     Semaglutide, 1 MG/DOSE, (Ozempic, 1 MG/DOSE,) 4 MG/3ML solution pen-injector, Inject 1 mg under the skin into the appropriate area as directed 1 (One) Time Per Week., Disp: 3 mL, Rfl: 5    silver sulfadiazine (SILVADENE, SSD) 1 % cream, Apply  topically to the appropriate area as directed., Disp: , Rfl:     sodium hypochlorite (DAKIN'S 1/4 STRENGTH) 0.125 % solution topical solution 0.125%, Apply 10 mL topically to the appropriate area as directed 2 (Two) Times a Day., Disp: 1000 mL, Rfl: 1    tamsulosin (FLOMAX) 0.4 MG capsule 24 hr capsule, Take 1 capsule by mouth every night at bedtime., Disp: 30 capsule, Rfl: 5    tobramycin PF (MOIZ) 300 MG/5ML nebulizer solution, nebulize 1 vial BY MOUTH TWICE DAILY FOR 28 DAYS ON AND 28 DAYS OFF, Disp: , Rfl:     vitamin C (ASCORBIC ACID) 500 MG tablet, Take 1 tablet by mouth 2 (Two) Times a  "Day., Disp: 60 tablet, Rfl: 5    zinc sulfate (ZINCATE) 220 (50 Zn) MG capsule, Take 1 capsule by mouth Every Morning., Disp: 30 capsule, Rfl: 5     Allergies   Allergen Reactions    Benadryl [Diphenhydramine] Itching    Proventil [Albuterol] Other (See Comments)     Mouth sores         Family History   Problem Relation Age of Onset    Coronary artery disease Mother     Hypertension Mother     Diabetes type II Mother     Asthma Father     Diabetes type II Sister     Cancer Sister     Malig Hyperthermia Neg Hx     Colon cancer Neg Hx         Social History     Social History Narrative    Not on file       Objective       Vital Signs:   /63 (BP Location: Left arm, Patient Position: Sitting, Cuff Size: Adult)   Pulse 89   Ht 175.3 cm (69.02\")   BMI 33.95 kg/m²       Physical Exam  Constitutional:       Appearance: Normal appearance. He is normal weight.   HENT:      Head: Normocephalic and atraumatic.      Nose: Nose normal.   Pulmonary:      Effort: Pulmonary effort is normal.      Comments: 2L NC  Skin:     General: Skin is warm and dry.   Neurological:      Mental Status: He is alert and oriented to person, place, and time. Mental status is at baseline.   Psychiatric:         Mood and Affect: Mood normal.         Behavior: Behavior normal.         Thought Content: Thought content normal.         Judgment: Judgment normal.         Result Review :       CBC w/diff          4/23/2025    05:34 4/24/2025    05:01 5/6/2025    13:09   CBC w/Diff   WBC 14.58  14.15  12.07    RBC 3.11  3.04  3.48    Hemoglobin 8.7  8.4  9.6    Hematocrit 27.9  27.2  31.8    MCV 89.7  89.5  91.4    MCH 28.0  27.6  27.6    MCHC 31.2  30.9  30.2    RDW 14.1  14.1  14.4    Platelets 459  409  427    Neutrophil Rel % 73.0   71.4    Immature Granulocyte Rel % 0.5   0.2    Lymphocyte Rel % 14.8   15.7    Monocyte Rel % 7.7   7.7    Eosinophil Rel % 3.2   4.5    Basophil Rel % 0.8   0.5      CMP          4/23/2025    05:34 4/24/2025    " 05:01 5/6/2025    13:09   CMP   Glucose 79  125  492    BUN 25  22  25    Creatinine 1.84  2.03  2.19    EGFR 41.7  37.1  33.8    Sodium 137  136  137    Potassium 4.0  3.4  4.0    Chloride 98  95  94    Calcium 8.8  8.5  9.4    Total Protein  6.9  7.7    Albumin  2.5  3.1    Globulin  4.4  4.6    Total Bilirubin  0.3  <0.2    Alkaline Phosphatase  224  220    AST (SGOT)  51  55    ALT (SGPT)  39  26    Albumin/Globulin Ratio  0.6  0.7    BUN/Creatinine Ratio 13.6  10.8  11.4    Anion Gap 10.4  12.3  15.6                Assessment and Plan    Diagnoses and all orders for this visit:    1. [place] grade C esophagitis (Primary)  -     Case Request; Standing  -     Follow Anesthesia Guidelines / Protocol; Standing  -     Follow Anesthesia Guidelines / Protocol; Future  -     Verify NPO; Standing  -     Obtain Informed Consent; Standing  -     Case Request    Continue Protonix 40mg daily.  Cardiac clearance from Dr. Ware  Pulmonary clearance from Dr. Carolina  Recommended repeat EGD to document healing. Patient agreeable but wishes to defer to September due to ongoing health concerns. Surgical Risk and Benefits: Possible risk/complications, benefits, and alternatives to surgical or invasive procedure have been explained to patient and/or legal guardian. Risks include bleeding, infection, and perforation. Patient has been evaluated and can tolerate anesthesia and/or sedation. Risk, benefits, and alternatives to anesthesia and sedation have been explained to patient and/or legal guardian.     Follow Up   No follow-ups on file.  Patient was given instructions and counseling regarding his condition or for health maintenance advice. Please see specific information pulled into the AVS if appropriate.

## 2025-06-20 ENCOUNTER — PATIENT OUTREACH (OUTPATIENT)
Dept: CASE MANAGEMENT | Facility: OTHER | Age: 60
End: 2025-06-20
Payer: COMMERCIAL

## 2025-06-20 DIAGNOSIS — J44.1 CHRONIC OBSTRUCTIVE PULMONARY DISEASE WITH ACUTE EXACERBATION: ICD-10-CM

## 2025-06-20 DIAGNOSIS — E11.65 TYPE 2 DIABETES MELLITUS WITH HYPERGLYCEMIA, WITH LONG-TERM CURRENT USE OF INSULIN: ICD-10-CM

## 2025-06-20 DIAGNOSIS — J96.12 CHRONIC RESPIRATORY FAILURE WITH HYPOXIA AND HYPERCAPNIA: ICD-10-CM

## 2025-06-20 DIAGNOSIS — N18.31 STAGE 3A CHRONIC KIDNEY DISEASE: Primary | ICD-10-CM

## 2025-06-20 DIAGNOSIS — J96.11 CHRONIC RESPIRATORY FAILURE WITH HYPOXIA AND HYPERCAPNIA: ICD-10-CM

## 2025-06-20 DIAGNOSIS — Z79.4 TYPE 2 DIABETES MELLITUS WITH HYPERGLYCEMIA, WITH LONG-TERM CURRENT USE OF INSULIN: ICD-10-CM

## 2025-06-20 NOTE — OUTREACH NOTE
[facility] End of Month Documentation    This Chronic Medical Management Care Plan for Preston Wallis, 59 y.o. male, has been monitored and managed; reviewed and a new plan of care implemented for the month of June.  A cumulative time of 34 minutes was spent on this patient record this month, including phone call with care giver; electronic communication with primary care provider; electronic communication with pharmacist; chart review.    Regarding the patient's problems: has Chronic cough; Polyneuropathy; Paroxysmal atrial fibrillation; Obstructive sleep apnea; Insomnia; Allergies; COPD exacerbation; Chronic anticoagulation; Benign prostatic hyperplasia; Difficulty using continuous positive airway pressure (CPAP) device; Stage 3a chronic kidney disease; Iron deficiency anemia secondary to inadequate dietary iron intake; Vitamin D deficiency; Lower extremity edema; Venous insufficiency (chronic) (peripheral); Chronic dyspnea; Gastroesophageal reflux disease; Altered mental status; Hypertensive heart disease with chronic diastolic congestive heart failure; Diabetic neuropathy; Hyperlipidemia; Luetscher's syndrome; Neurogenic bladder; Pneumonia due to Pseudomonas species; Seizures; Chronic obstructive pulmonary disease; Essential hypertension; Chronic pain of left knee; Bronchiectasis with acute exacerbation; Bacterial pneumonia; Anemia; Closed fracture of left tibial plateau; Left foot pain; Chronic respiratory failure with hypoxia and hypercapnia; Impaired cognition; Atherosclerosis of native arteries of the extremities with ulceration; Chronic kidney disease, stage IV (severe); Alcoholic cirrhosis; Allergic rhinitis; Major depressive disorder, single episode, in partial remission; Oxygen dependent; Type 2 diabetes mellitus with diabetic peripheral angiopathy without gangrene, with long-term current use of insulin; Nausea and vomiting; and [place] grade C esophagitis on their problem list., the following items were  addressed: medications; transitions to medical care; medical records; referrals to community service providers and any changes can be found within the plan section of the note.  A detailed listing of time spent for chronic care management is tracked within each outreach encounter.  Current medications include:  has a current medication list which includes the following prescription(s): amantadine, apixaban, arformoterol, aspirin, atorvastatin, budesonide, buspirone, calcium citrate, vitamin d3, santyl, freestyle clau 3 plus sensor, dapagliflozin, diltiazem cd, docusate sodium, duloxetine, ferrous gluconate, finasteride, folic acid, basaglar kwikpen, insulin lispro (1 unit dial), ipratropium-albuterol, magnesium oxide -mg supplement, menthol-zinc oxide, montelukast, multi-vitamin/iron, arlen nutrivigor, ondansetron odt, pantoprazole, betadine, ozempic (1 mg/dose), silver sulfadiazine, sodium hypochlorite, tamsulosin, tobramycin pf, vitamin c, and zinc sulfate. and the patient is reported to be caregiver will take responsibility for med compliance; patient is compliant with medication protocol,  Medications are reported to be non-effective in controlling symptoms and changes have been made to the medication protocol.  Regarding these diagnoses, referrals were made to the following provider(s):  specialists.  All notes on chart for PCP to review.    The patient was monitored remotely for pain; medications; blood glucose; mood & behavior; activity level.    The patient's physical needs include:  help taking medications as prescribed; medication education; needs assistance with ADLs; resources for disability needs; DME supplies.     The patient's mental support needs include:  continued support    The patient's cognitive support needs include:  medication; continued support; needs assistance with ADLs; health care    The patient's psychosocial support needs include:  continued support; coordination of community  providers; medication management or adherence    The patient's functional needs include: health care coverage; medication education; needs assistance for ADLs; physical healthcare; physician referral; resources for disability needs    The patient's environmental needs include:  resources for disability needs; no involvement in outside activities or no access to other activities    Care Plan overall comments:  Still not at goal, however improving. will continue to follow. Has many upcoming appointments and referrals to specialists. A1c not at goal, continuing ways to improve with addition of new medications. Will follow, new hospitalization.    Refer to previous outreach notes for more information on the areas listed above.    Monthly Billing Diagnoses  (N18.31) Stage 3a chronic kidney disease    (E11.65,  Z79.4) Type 2 diabetes mellitus with hyperglycemia, with long-term current use of insulin    (J44.1) Chronic obstructive pulmonary disease with acute exacerbation    (J96.11,  J96.12) Chronic respiratory failure with hypoxia and hypercapnia    Medications   Medications have been reconciled    Care Plan progress this month:      Recently Modified Care Plans Updates made since 5/20/2025 12:00 AM      No recently modified care plans.             Chronic Kidney       Track and Manage My Symptoms (Progressing)       Start:  12/06/24    Expected End:  06/06/25         My Symptom Management To Do List       Start:  12/06/24         Why is this important?  Keeping track of symptoms can help you feel the best. It also helps the doctor stay on top of any changes to the disease. It may also help keep your disease from getting worse. Taking simple steps can help you cope with  symptoms like feeling very tired or itchy skin.              Follow My Treatment Plan (Progressing)       Start:  12/06/24    Expected End:  06/06/25         My Treatment Plan To Do List       Start:  12/06/24         Why is this important?  Staying as  healthy as you can is very important. This may mean making changes if you smoke, don't exercise or eat poorly.  A healthy lifestyle is an important goal for you.  Following the treatment plan and making changes may be hard.  Try some of these steps to help keep the disease from getting worse.              Manage My Diet (Progressing)       Start:  12/06/24    Expected End:  06/06/25         My Diet Management To Do List       Start:  12/06/24         Why is this important?  A healthy diet is important for mental and physical health.  Healthy food helps repair damaged body tissue and maintains strong bones and muscles.  No single food is just right so eating a variety of proteins, fruits, vegetables and grains is best.  You may need to change what you eat or drink to manage kidney disease.  A dietitian is the best person to guide you.              Optimal Care Coordination of a Patient Experiencing Chronic Kidney Disease (Progressing)       Start:  12/06/24    Expected End:  06/06/25         Support Psychological Response to Chronic Kidney Disease       Start:  12/06/24          Initial Last    Support Psychological Response to Chronic Kidney Disease: caregiver stress acknowledged;caregiver support provided;goal setting facilitated;positive reinforcement provided;self-care encouraged taken at 12/06/24 caregiver support provided taken at 06/20/25         Facilitate Activities to Optimize Comfort and Well-Being       Start:  12/06/24          Initial Last    Facilitate Activities to Optimize Comfort and Well-Being: alternating periods of activity with rest promoted;sleep-hygiene practices encouraged taken at 12/06/24 alternating periods of activity with rest promoted;meticulous skin care promoted taken at 06/20/25         Alleviate Barriers to Chronic Kidney Disease Treatment       Start:  12/06/24          Initial Last    Alleviate Barriers to Chronic Kidney Disease Treatment: medication side effects managed;barriers  to treatment adherence identified;activity or exercise based on tolerance encouraged Reached out to nephrology office to review labs and any interventions needed.  Sent abnormal labs to oncall provider and pcp. taken at 12/06/24 activity or exercise based on tolerance encouraged;healthy lifestyle promoted taken at 06/20/25         Maintain or Improve Strength or Functional Ability       Start:  12/06/24          Initial Last    Maintain or Improve Strength or Functional Ability: activity or exercise based on tolerance encouraged;assistive or adaptive device use encouraged taken at 12/06/24 assistive or adaptive device use encouraged;activity or exercise based on tolerance encouraged taken at 06/20/25         Alleviate Barriers to Optimal Nutrition Status       Start:  12/06/24          Initial Last    Alleviate Barriers to Optimal Nutrition Status: diversity of foods encouraged;support and encouragement provided taken at 12/06/24 support and encouragement provided taken at 06/20/25         Facilitate Multi-Modal Pain Management       Start:  12/06/24          Initial    Facilitate Multi-Modal Pain Management: pain assessed taken at 12/06/24         Develop and Maintain Palliative Care Plan       Start:  12/06/24                Current Specialty Plan of Care Status signed by both patient and provider    Instructions   Patient was provided an electronic copy of care plan  CCM services were explained and offered and patient has accepted these services.  Patient has given their written consent to receive CCM services and understands that this includes the authorization of electronic communication of medical information with the other treating providers.  Patient understands that they may stop CCM services at any time and these changes will be effective at the end of the calendar month and will effectively revocate the agreement of CCM services.  Patient understands that only one practitioner can furnish and be paid for  CCM services during one calendar month.  Patient also understands that there may be co-payment or deductible fees in association with CCM services.  Patient will continue with at least monthly follow-up calls with the Ambulatory .    Tara GOMEZ  Ambulatory Case Management    6/20/2025, 16:13 EDT

## 2025-06-20 NOTE — OUTREACH NOTE
AMBULATORY CASE MANAGEMENT NOTE    Names and Relationships of Patient/Support Persons:  -     Elizabeth called to report that Gómez is having increased neuropathy ( she describes) numbness, he is dropping and having trouble grasping items. Could be coming from his neck but would not be a candidate for surgery if so.   He may be eligible for shoulder joint injections or hip.    Elizabeth states that his foot is almost 100% healed and her hope for him is to get PT and get him more mobile.  Wound care appointment nest week.    They are not really wanting to see yet another doctor, but he may be best suited at pain management or ortho.  I do not see any recent imaging of hips or shoulders.  He will need to have shoulder strength for him to help himself up to be more mobile.  Scheduled appointment for July to discuss.      Education Documentation  No documentation found.        Tara GOMEZ  Ambulatory Case Management    6/20/2025, 16:31 EDT

## 2025-06-20 NOTE — PLAN OF CARE
Problem: Chronic Kidney  Goal: Track and Manage My Symptoms  Outcome: Progressing  Goal: Follow My Treatment Plan  Outcome: Progressing  Goal: Manage My Diet  Outcome: Progressing  Goal: Optimal Care Coordination of a Patient Experiencing Chronic Kidney Disease  Outcome: Progressing  Intervention: Support Psychological Response to Chronic Kidney Disease  Flowsheets (Taken 6/20/2025 1609)  Support Psychological Response to Chronic Kidney Disease: caregiver support provided  Intervention: Facilitate Activities to Optimize Comfort and Well-Being  Flowsheets (Taken 6/20/2025 1609)  Facilitate Activities to Optimize Comfort and Well-Being:   alternating periods of activity with rest promoted   meticulous skin care promoted  Intervention: Alleviate Barriers to Chronic Kidney Disease Treatment  Flowsheets (Taken 6/20/2025 1609)  Alleviate Barriers to Chronic Kidney Disease Treatment:   activity or exercise based on tolerance encouraged   healthy lifestyle promoted  Intervention: Maintain or Improve Strength or Functional Ability  Flowsheets (Taken 6/20/2025 1609)  Maintain or Improve Strength or Functional Ability:   assistive or adaptive device use encouraged   activity or exercise based on tolerance encouraged  Intervention: Alleviate Barriers to Optimal Nutrition Status  Flowsheets (Taken 6/20/2025 1609)  Alleviate Barriers to Optimal Nutrition Status: support and encouragement provided

## 2025-06-23 NOTE — PROGRESS NOTES
I would definitely work with physical therapy to help strengthen the shoulders and lower extremity first, but if he is going to need joint injections in the hips and/or shoulders he would be most benefited by seeing an orthopedist in town.  This could take months to get him an appointment.  I would recommend going ahead and setting up an appointment in case he does not improve with physical therapy alone.  I can get imaging when he comes in July.  Dr. Riley

## 2025-06-24 DIAGNOSIS — M25.511 RIGHT SHOULDER PAIN, UNSPECIFIED CHRONICITY: Primary | ICD-10-CM

## 2025-06-25 ENCOUNTER — OFFICE VISIT (OUTPATIENT)
Dept: WOUND CARE | Facility: HOSPITAL | Age: 60
End: 2025-06-25
Payer: COMMERCIAL

## 2025-06-25 VITALS
SYSTOLIC BLOOD PRESSURE: 147 MMHG | TEMPERATURE: 97.5 F | HEART RATE: 94 BPM | DIASTOLIC BLOOD PRESSURE: 62 MMHG | RESPIRATION RATE: 18 BRPM

## 2025-06-25 DIAGNOSIS — J96.11 CHRONIC RESPIRATORY FAILURE WITH HYPOXIA, ON HOME O2 THERAPY: ICD-10-CM

## 2025-06-25 DIAGNOSIS — Z79.4 TYPE 2 DIABETES MELLITUS WITH FOOT ULCER, WITH LONG-TERM CURRENT USE OF INSULIN: ICD-10-CM

## 2025-06-25 DIAGNOSIS — L89.152 PRESSURE INJURY OF SACRAL REGION, STAGE 2: ICD-10-CM

## 2025-06-25 DIAGNOSIS — Z79.4 TYPE 2 DIABETES MELLITUS WITH OTHER SKIN ULCER, WITH LONG-TERM CURRENT USE OF INSULIN: ICD-10-CM

## 2025-06-25 DIAGNOSIS — I50.32 CHRONIC DIASTOLIC HEART FAILURE: ICD-10-CM

## 2025-06-25 DIAGNOSIS — E11.621 TYPE 2 DIABETES MELLITUS WITH FOOT ULCER, WITH LONG-TERM CURRENT USE OF INSULIN: ICD-10-CM

## 2025-06-25 DIAGNOSIS — L97.509 TYPE 2 DIABETES MELLITUS WITH FOOT ULCER, WITH LONG-TERM CURRENT USE OF INSULIN: ICD-10-CM

## 2025-06-25 DIAGNOSIS — Z98.890 PERIPHERAL ARTERIAL DISEASE WITH HISTORY OF REVASCULARIZATION: ICD-10-CM

## 2025-06-25 DIAGNOSIS — I73.9 PERIPHERAL ARTERIAL DISEASE WITH HISTORY OF REVASCULARIZATION: ICD-10-CM

## 2025-06-25 DIAGNOSIS — L89.623 PRESSURE INJURY OF LEFT HEEL, STAGE 3: Primary | ICD-10-CM

## 2025-06-25 DIAGNOSIS — E11.622 TYPE 2 DIABETES MELLITUS WITH OTHER SKIN ULCER, WITH LONG-TERM CURRENT USE OF INSULIN: ICD-10-CM

## 2025-06-25 DIAGNOSIS — Z99.81 CHRONIC RESPIRATORY FAILURE WITH HYPOXIA, ON HOME O2 THERAPY: ICD-10-CM

## 2025-06-25 PROCEDURE — 3078F DIAST BP <80 MM HG: CPT | Performed by: EMERGENCY MEDICINE

## 2025-06-25 PROCEDURE — 1160F RVW MEDS BY RX/DR IN RCRD: CPT | Performed by: EMERGENCY MEDICINE

## 2025-06-25 PROCEDURE — 1159F MED LIST DOCD IN RCRD: CPT | Performed by: EMERGENCY MEDICINE

## 2025-06-25 PROCEDURE — 3077F SYST BP >= 140 MM HG: CPT | Performed by: EMERGENCY MEDICINE

## 2025-06-25 PROCEDURE — G0463 HOSPITAL OUTPT CLINIC VISIT: HCPCS | Performed by: EMERGENCY MEDICINE

## 2025-06-25 NOTE — PROGRESS NOTES
"Chief Complaint  Wound Check (Pt here today for evaluation of wounds to left heel and coccyx. Pt states the coccyx is healed and they are only applying Calmoseptine and the heel is \"almost healed\". () )    Subjective      History of Present Illness    Preston Wallis  is a 60 y.o. male     History of Present Illness  The patient is a 60-year-old male here for evaluation and recheck of wounds on his coccyx and left heel.  He is accompanied by his wife who does his dressing changes for him.    He has been applying Calmoseptine to the wound on his coccyx, which appears to be making progress. He reports no pain associated with this wound.    For the wound on his left heel, he has been using collagen and Aquacel Ag.  They are curious when he can start weightbearing so that he can begin physical therapy.    MEDICATIONS  Current: Calmoseptine, collagen, Aquacel    Allergies:  Benadryl [diphenhydramine] and Proventil [albuterol]      Current Outpatient Medications:     amantadine (SYMMETREL) 100 MG tablet, , Disp: , Rfl:     apixaban (Eliquis) 5 MG tablet tablet, Take 1 tablet by mouth Every 12 (Twelve) Hours., Disp: 60 tablet, Rfl: 5    arformoterol (BROVANA) 15 MCG/2ML nebulizer solution, Take 2 mL by nebulization 2 (Two) Times a Day., Disp: 360 mL, Rfl: 3    aspirin (Aspirin Low Dose) 81 MG EC tablet, Take 1 tablet by mouth Every Morning., Disp: 30 tablet, Rfl: 5    atorvastatin (LIPITOR) 20 MG tablet, Take 1 tablet by mouth every night at bedtime., Disp: 30 tablet, Rfl: 5    budesonide (Pulmicort) 0.5 MG/2ML nebulizer solution, Take 2 mL by nebulization 2 (Two) Times a Day., Disp: 360 mL, Rfl: 3    busPIRone (BUSPAR) 15 MG tablet, Take 1 tablet by mouth 2 (Two) Times a Day., Disp: 60 tablet, Rfl: 5    calcium citrate (CALCITRATE) 950 (200 Ca) MG tablet, Take 1 tablet by mouth every night at bedtime., Disp: 30 tablet, Rfl: 5    Cholecalciferol (Vitamin D3) 50 MCG (2000 UT) tablet, Take 1 tablet by mouth Every " Morning., Disp: 30 tablet, Rfl: 5    collagenase (Santyl) 250 UNIT/GM ointment, Apply 1 Application topically to the appropriate area as directed Daily., Disp: 30 g, Rfl: 1    Continuous Glucose Sensor (FreeStyle Bethany 3 Plus Sensor), Use Every 15 (Fifteen) Days., Disp: 2 each, Rfl: 5    dapagliflozin (Farxiga) 5 MG tablet tablet, Take 1 tablet by mouth Every Morning., Disp: 30 tablet, Rfl: 5    dilTIAZem CD (CARDIZEM CD) 240 MG 24 hr capsule, Take 1 capsule by mouth Every Morning., Disp: 30 capsule, Rfl: 5    docusate sodium (COLACE) 100 MG capsule, Take 1 capsule by mouth 2 (Two) Times a Day., Disp: 60 capsule, Rfl: 5    DULoxetine (Cymbalta) 30 MG capsule, Take 1 capsule by mouth Daily., Disp: 30 capsule, Rfl: 5    ferrous gluconate (FERGON) 324 MG tablet, Take 1 tablet by mouth Every Morning., Disp: 30 tablet, Rfl: 5    finasteride (PROSCAR) 5 MG tablet, Take 1 tablet by mouth every night at bedtime., Disp: 30 tablet, Rfl: 5    folic acid (FOLVITE) 1 MG tablet, Take 1 tablet by mouth every night at bedtime., Disp: 30 tablet, Rfl: 5    Insulin Glargine (BASAGLAR KWIKPEN) 100 UNIT/ML injection pen, Inject 15 Units under the skin into the appropriate area as directed Daily for 180 days., Disp: 15 mL, Rfl: 1    Insulin Lispro, 1 Unit Dial, (HUMALOG) 100 UNIT/ML solution pen-injector, Inject 5 Units under the skin into the appropriate area as directed 3 (Three) Times a Day Before Meals., Disp: 15 mL, Rfl: 0    ipratropium-albuterol (DUO-NEB) 0.5-2.5 mg/3 ml nebulizer, Take 3 mL by nebulization Every 4 (Four) Hours As Needed for Wheezing or Shortness of Air., Disp: 360 mL, Rfl: 5    Magnesium Oxide -Mg Supplement 400 (240 Mg) MG tablet, Take 1 tablet by mouth every night at bedtime., Disp: 30 tablet, Rfl: 5    Menthol-Zinc Oxide 0.44-20.6 % ointment, Apply 1 Application topically to the appropriate area as directed 2 (Two) Times a Day. With calamine, i.e. Calmoseptine, Disp: 113 g, Rfl: 1    montelukast (SINGULAIR) 10  MG tablet, Take 1 tablet by mouth Every Night., Disp: 30 tablet, Rfl: 5    Multiple Vitamins-Iron (Multi-Vitamin/Iron) tablet, Take 1 tablet by mouth Every Morning., Disp: 30 each, Rfl: 5    Nutritional Supplements (Rosalino Nutrivigor) pack, Take 1 packet by mouth 2 (Two) Times a Day., Disp: 180 each, Rfl: 1    ondansetron ODT (ZOFRAN-ODT) 4 MG disintegrating tablet, Place 1 tablet on the tongue Every 8 (Eight) Hours As Needed for Nausea or Vomiting., Disp: 20 tablet, Rfl: 0    pantoprazole (PROTONIX) 40 MG EC tablet, Take 1 tablet by mouth Every Morning., Disp: 30 tablet, Rfl: 5    Povidone-Iodine (Betadine) 5 % external solution 5%, Apply 1 mL topically to the appropriate area as directed 2 (Two) Times a Day. Left Heel, Disp: , Rfl:     Semaglutide, 1 MG/DOSE, (Ozempic, 1 MG/DOSE,) 4 MG/3ML solution pen-injector, Inject 1 mg under the skin into the appropriate area as directed 1 (One) Time Per Week., Disp: 3 mL, Rfl: 5    silver sulfadiazine (SILVADENE, SSD) 1 % cream, Apply  topically to the appropriate area as directed., Disp: , Rfl:     sodium hypochlorite (DAKIN'S 1/4 STRENGTH) 0.125 % solution topical solution 0.125%, Apply 10 mL topically to the appropriate area as directed 2 (Two) Times a Day., Disp: 1000 mL, Rfl: 1    tamsulosin (FLOMAX) 0.4 MG capsule 24 hr capsule, Take 1 capsule by mouth every night at bedtime., Disp: 30 capsule, Rfl: 5    tobramycin PF (MOIZ) 300 MG/5ML nebulizer solution, nebulize 1 vial BY MOUTH TWICE DAILY FOR 28 DAYS ON AND 28 DAYS OFF, Disp: , Rfl:     vitamin C (ASCORBIC ACID) 500 MG tablet, Take 1 tablet by mouth 2 (Two) Times a Day., Disp: 60 tablet, Rfl: 5    zinc sulfate (ZINCATE) 220 (50 Zn) MG capsule, Take 1 capsule by mouth Every Morning., Disp: 30 capsule, Rfl: 5    Past Medical History:   Diagnosis Date    1. Incision and drainage of posterior perianal abscess 2. Sharp excisional debridement through skin and subcutaneous tissue in the posterior perianal area, 9 x 6 x 1 cm  "01/26/2025    Age-related cognitive decline     Allergic contact dermatitis     Allergies     Anemia     Asthma     Bedbound     11/2023 \"MY LEG MUSCLES STOPPED WORKING\"    Bronchiectasis with acute lower respiratory infection     Charcot foot due to diabetes mellitus 09/10/2013    Chronic diastolic (congestive) heart failure     Chronic kidney disease     Chronic respiratory failure with hypoxia     Closed supracondylar fracture of femur 01/12/2022    COPD (chronic obstructive pulmonary disease)     Deep vein thrombosis (DVT) of lower extremity associated with air travel 01/13/2023    Dependence on supplemental oxygen     Eczema     Elevated cholesterol     Erectile dysfunction     due to organic reasons    Essential (primary) hypertension     Fracture     closed fracture of other tarsal and metatarsal bones    Fracture of proximal humerus 01/13/2023    GERD without esophagitis     High risk medication use     Hypercholesteremia     Hypomagnesemia     Infected stasis ulcer of left lower extremity 01/13/2023    Insomnia     Low back pain     Major depressive disorder     Morbid (severe) obesity due to excess calories     MRSA pneumonia     Muscle weakness     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 without evidence of necrosis     Obstructive sleep apnea (adult) (pediatric)     On home O2     REPORTS WEARING 2L/NC AAT    Other forms of dyspnea     Other long term (current) drug therapy     Other specified noninfective gastroenteritis and colitis     Other spondylosis, lumbar region     Pain in both knees     Paroxysmal atrial fibrillation     Peripheral neuropathy     attributed to type 2 diabetes    Pneumonia, unspecified organism     Polyneuropathy     Rash and other nonspecific skin eruption     Self-catheterizes urinary bladder     EVERY 4 HOURS    Smoking     \"SOMETIMES\"    Syncope and collapse     Tachycardia     Tinnitus 01/13/2023    Type 1 diabetes mellitus with " diabetic chronic kidney disease     Type 2 diabetes mellitus     Unspecified fall, initial encounter     Urinary retention      Past Surgical History:   Procedure Laterality Date    BRONCHOSCOPY N/A 1/28/2025    Procedure: BRONCHOSCOPY: BAL: insertion of lighted instrument to view inside the lung;  Surgeon: Walter Nicole DO;  Location: [facility] MAIN OR;  Service: Pulmonary;  Laterality: N/A;    BRONCHOSCOPY N/A 2/3/2025    Procedure: BRONCHOSCOPY WITH BRONCHOALVEOLAR LAVAGE, POSSIBLE BIOPSY, BRUSHING, WASHING, AIRWAY INSPECTION: insertion of lighted instrument to view inside the lung;  Surgeon: Chadd Swan MD;  Location: [facility] MAIN OR;  Service: Pulmonary;  Laterality: N/A;    CARDIAC CATHETERIZATION Left 8/15/2024    Procedure: Carbon dioxide aortogram with left leg angiogram, possible angioplasty or stenting;  Surgeon: Moshe Willson MD;  Location: [facility] CATH INVASIVE LOCATION;  Service: Vascular;  Laterality: Left;    CHOLECYSTECTOMY      COLOSTOMY N/A 2/6/2025    Procedure: COLOSTOMY LAPAROSCOPIC; plain text: creation of colostomy using small incisions;  Surgeon: Emerson Canada MD;  Location: [facility] OR OSC;  Service: General;  Laterality: N/A;    CYSTOSCOPY      ENDOSCOPY N/A 3/18/2025    Procedure: ESOPHAGOGASTRODUODENOSCOPY WITH BIOPSIES;  Surgeon: Rafael Hernandez MD;  Location: [facility] ENDOSCOPY;  Service: Gastroenterology;  Laterality: N/A;  HIATAL HERNIA, REFLUX ESOPHAGITIS    FEMUR SURGERY Left     Shravan placed    INCISION AND DRAINAGE ABSCESS N/A 1/26/2025    Procedure: INCISION AND DRAINAGE ABSCESS; plain text: incision and drain pus from buttocks wound;  Surgeon: Emerson Canada MD;  Location: [facility] OR [facility]SC;  Service: General;  Laterality: N/A;    KNEE SURGERY Left     OTHER SURGICAL HISTORY Left     venous port, REMOVED    PORTACATH PLACEMENT Right     TIBIAL PLATEAU OPEN REDUCTION INTERNAL FIXATION Left 12/22/2023    Procedure: TIBIAL PLATEAU OPEN REDUCTION INTERNAL FIXATION;   Surgeon: Hugo Kline MD;  Location: [facility] OR;  Service: Orthopedics;  Laterality: Left;    TONSILLECTOMY AND ADENOIDECTOMY       Social History     Socioeconomic History    Marital status:    Tobacco Use    Smoking status: Former     Current packs/day: 0.00     Average packs/day: 1 pack/day for 12.0 years (12.0 ttl pk-yrs)     Types: Cigarettes     Start date:      Quit date:      Years since quittin.5     Passive exposure: Past    Smokeless tobacco: Never   Vaping Use    Vaping status: Never Used   Substance and Sexual Activity    Alcohol use: Not Currently    Drug use: Never    Sexual activity: Defer           Objective     Vitals:    25 1027   BP: 147/62   BP Location: Left arm   Patient Position: Sitting   Cuff Size: Adult   Pulse: 94   Resp: 18  Comment: 98% 2L   Temp: 97.5 °F (36.4 °C)   TempSrc: Temporal   PainSc: 3   Comment: tingling and burning   PainLoc: Foot  Comment: bilateral     There is no height or weight on file to calculate BMI.    STEADI Fall Risk Assessment has not been completed.     Review of Systems     ROS:  Per HPI.     I have reviewed the HPI and ROS as documented by MA/RN. Katerin Torres MD    Physical Exam     NAD  AAOx3, pleasant, cooperative    Physical Exam  Two very tiny wounds are present overlying the sacrum. A small amount of what appears to be old wound VAC foam was removed from the wounds. After removal, bases are red and healthy. Silver nitrate was applied to stimulate healing. The left heel wound is also significantly improved, although there is some macerated tissue along the medial aspect with slight blistering. The remaining area of unhealed wound is pink and healthy with thin areas of epithelialization and skin islands noted.               Result Review :  The following data was reviewed by: Katerin Torres MD on 2025:    Prior notes and images.               Assessment and Plan   Diagnoses and all orders for this  visit:    1. Pressure injury of left heel, stage 3 (Primary)    2. Pressure injury of sacral region, stage 2    3. Type 2 diabetes mellitus with foot ulcer, with long-term current use of insulin    4. Chronic diastolic heart failure    5. Chronic respiratory failure with hypoxia, on home O2 therapy    6. Peripheral arterial disease with history of revascularization    7. Type 2 diabetes mellitus with other skin ulcer, with long-term current use of insulin        Assessment & Plan  1. Sacral wound.  The sacral wound is showing significant improvement. Silver nitrate was applied to stimulate further healing. He is advised to continue using Calmoseptine on the wound and cover it with a Band-Aid due to its open nature.    2. Left heel wound.  The left heel wound is also significantly improved, although there is some macerated tissue along the medial aspect with slight blistering. The remaining area of the unhealed wound is pink and healthy with thin areas of epithelialization and skin islands noted. He is advised to apply Betadine to the macerated area to promote drying. He can continue using collagen and Aquacel on the pink part of the wound, but it may not absorb much longer due to skin bridging and epithelialization. He is encouraged to start weight-bearing activities with caution, ensuring that the back of the heel is protected. He should wear shoes that do not exert pressure on the wound and monitor for any signs of redness or pressure points.    Follow-up  The patient will follow up in 6 weeks.    PROCEDURE  Silver nitrate was applied to the sacral wounds to stimulate healing.      Patient was given instructions and counseling regarding their condition or for health maintenance advice, as well as the wound care plan and recommendations. They understand and agree with the plan.  They will follow back up here in the clinic but are instructed to contact us in the interim should they have any new, returning, or  worsening symptoms or concerns. Please see specific information pulled into the AVS if appropriate.     Dragon Dictation utilized for chart completion.    Follow Up   Return in about 6 weeks (around 8/6/2025).      Katerin Torres MD    Patient or patient representative verbalized consent for the use of Ambient Listening during the visit with  Katerin Torres MD for chart documentation. 6/25/2025  11:08 EDT

## 2025-06-27 ENCOUNTER — LAB (OUTPATIENT)
Dept: LAB | Facility: HOSPITAL | Age: 60
End: 2025-06-27
Payer: COMMERCIAL

## 2025-06-27 ENCOUNTER — RESULTS FOLLOW-UP (OUTPATIENT)
Dept: LAB | Facility: HOSPITAL | Age: 60
End: 2025-06-27
Payer: COMMERCIAL

## 2025-06-27 ENCOUNTER — OFFICE VISIT (OUTPATIENT)
Dept: DIABETES SERVICES | Facility: CLINIC | Age: 60
End: 2025-06-27
Payer: COMMERCIAL

## 2025-06-27 ENCOUNTER — TELEPHONE (OUTPATIENT)
Dept: UROLOGY | Age: 60
End: 2025-06-27
Payer: COMMERCIAL

## 2025-06-27 ENCOUNTER — TELEPHONE (OUTPATIENT)
Dept: FAMILY MEDICINE CLINIC | Age: 60
End: 2025-06-27
Payer: COMMERCIAL

## 2025-06-27 VITALS
HEART RATE: 89 BPM | DIASTOLIC BLOOD PRESSURE: 66 MMHG | WEIGHT: 245 LBS | HEIGHT: 69 IN | SYSTOLIC BLOOD PRESSURE: 167 MMHG | OXYGEN SATURATION: 95 % | BODY MASS INDEX: 36.29 KG/M2

## 2025-06-27 DIAGNOSIS — E11.65 UNCONTROLLED TYPE 2 DIABETES MELLITUS WITH HYPERGLYCEMIA: Primary | ICD-10-CM

## 2025-06-27 DIAGNOSIS — Z79.4 TYPE 2 DIABETES MELLITUS WITH BOTH EYES AFFECTED BY MILD NONPROLIFERATIVE RETINOPATHY WITHOUT MACULAR EDEMA, WITH LONG-TERM CURRENT USE OF INSULIN: ICD-10-CM

## 2025-06-27 DIAGNOSIS — R33.9 URINARY RETENTION: ICD-10-CM

## 2025-06-27 DIAGNOSIS — Z97.8 USES SELF-APPLIED CONTINUOUS GLUCOSE MONITORING DEVICE: ICD-10-CM

## 2025-06-27 DIAGNOSIS — Z79.4 TYPE 2 DIABETES MELLITUS WITH DIABETIC NEUROPATHY, WITH LONG-TERM CURRENT USE OF INSULIN: ICD-10-CM

## 2025-06-27 DIAGNOSIS — N18.32 TYPE 2 DIABETES MELLITUS WITH STAGE 3B CHRONIC KIDNEY DISEASE, WITH LONG-TERM CURRENT USE OF INSULIN: ICD-10-CM

## 2025-06-27 DIAGNOSIS — E11.3293 TYPE 2 DIABETES MELLITUS WITH BOTH EYES AFFECTED BY MILD NONPROLIFERATIVE RETINOPATHY WITHOUT MACULAR EDEMA, WITH LONG-TERM CURRENT USE OF INSULIN: ICD-10-CM

## 2025-06-27 DIAGNOSIS — Z79.4 TYPE 2 DIABETES MELLITUS WITH STAGE 3B CHRONIC KIDNEY DISEASE, WITH LONG-TERM CURRENT USE OF INSULIN: ICD-10-CM

## 2025-06-27 DIAGNOSIS — E11.40 TYPE 2 DIABETES MELLITUS WITH DIABETIC NEUROPATHY, WITH LONG-TERM CURRENT USE OF INSULIN: ICD-10-CM

## 2025-06-27 DIAGNOSIS — E11.22 TYPE 2 DIABETES MELLITUS WITH STAGE 3B CHRONIC KIDNEY DISEASE, WITH LONG-TERM CURRENT USE OF INSULIN: ICD-10-CM

## 2025-06-27 DIAGNOSIS — E11.621 DIABETIC ULCER OF LEFT HEEL ASSOCIATED WITH TYPE 2 DIABETES MELLITUS, LIMITED TO BREAKDOWN OF SKIN: ICD-10-CM

## 2025-06-27 DIAGNOSIS — L97.421 DIABETIC ULCER OF LEFT HEEL ASSOCIATED WITH TYPE 2 DIABETES MELLITUS, LIMITED TO BREAKDOWN OF SKIN: ICD-10-CM

## 2025-06-27 DIAGNOSIS — R33.9 URINARY RETENTION: Primary | ICD-10-CM

## 2025-06-27 DIAGNOSIS — B37.9 CANDIDIASIS: Primary | ICD-10-CM

## 2025-06-27 LAB
BACTERIA UR QL AUTO: ABNORMAL /HPF
BILIRUB UR QL STRIP: NEGATIVE
CLARITY UR: CLEAR
COLOR UR: YELLOW
EXPIRATION DATE: ABNORMAL
GLUCOSE UR STRIP-MCNC: ABNORMAL MG/DL
HBA1C MFR BLD: 7.7 % (ref 4.5–5.7)
HGB UR QL STRIP.AUTO: ABNORMAL
KETONES UR QL STRIP: NEGATIVE
LEUKOCYTE ESTERASE UR QL STRIP.AUTO: NEGATIVE
Lab: ABNORMAL
NITRITE UR QL STRIP: NEGATIVE
PH UR STRIP.AUTO: 6 [PH] (ref 5–8)
PROT UR QL STRIP: ABNORMAL
RBC # UR STRIP: ABNORMAL /HPF
REF LAB TEST METHOD: ABNORMAL
SP GR UR STRIP: 1.02 (ref 1–1.03)
SQUAMOUS #/AREA URNS HPF: ABNORMAL /HPF
UROBILINOGEN UR QL STRIP: ABNORMAL
WBC # UR STRIP: ABNORMAL /HPF
YEAST URNS QL MICRO: ABNORMAL /HPF

## 2025-06-27 PROCEDURE — 81001 URINALYSIS AUTO W/SCOPE: CPT

## 2025-06-27 PROCEDURE — 87086 URINE CULTURE/COLONY COUNT: CPT

## 2025-06-27 PROCEDURE — 87186 SC STD MICRODIL/AGAR DIL: CPT

## 2025-06-27 PROCEDURE — 87077 CULTURE AEROBIC IDENTIFY: CPT

## 2025-06-27 PROCEDURE — 87147 CULTURE TYPE IMMUNOLOGIC: CPT

## 2025-06-27 RX ORDER — INSULIN LISPRO 100 [IU]/ML
15 INJECTION, SOLUTION INTRAVENOUS; SUBCUTANEOUS
Qty: 40 ML | Refills: 1 | Status: SHIPPED | OUTPATIENT
Start: 2025-06-27 | End: 2025-12-24

## 2025-06-27 RX ORDER — FLUCONAZOLE 150 MG/1
150 TABLET ORAL DAILY
Qty: 3 TABLET | Refills: 0 | Status: SHIPPED | OUTPATIENT
Start: 2025-06-27

## 2025-06-27 NOTE — PROGRESS NOTES
Chief Complaint  Diabetes (Med management, A1C, CGM Eval)    Referred By: No ref. provider found    Subjective          Patient or patient representative verbalized consent for the use of Ambient Listening during the visit with  CON Ashley for chart documentation. 6/27/2025  11:35 EDT    Preston Wallis presents to [facility] for diabetes medication management    History of Present Illness    Visit type:  follow-up  Diabetes type:  Type 2  Current diabetes status/concerns/issues:     History of Present Illness  The patient is a 60-year-old male who presents for evaluation of diabetes mellitus.    His medication regimen includes Farxiga 5 mg once daily, glargine 15 units once daily, and Humalog 5 units before each meal, with additional sliding scale insulin as needed. The dosage of Humalog varies depending on his dietary intake. He also uses a Bethany sensor for glucose monitoring. He continues to take Ozempic 1 mg every Saturday. .    His diet typically consists of three meals a day, with breakfast often including milk and cereal, lunch around 12:00 PM to 1:00 PM, and dinner usually comprising of meat and vegetables. He also consumes snacks.    He has a sacral wound that has significantly improved, now presenting as a small spot. The wound on his left heel is nearing complete healing. He had two wounds on his left foot, one of which has completely healed, while the other, located at the bottom of the foot, is still healing but allows him to bear some weight. He has not yet regained the ability to walk.    His colostomy remains in place, with plans for reversal once complete healing is achieved, estimated to be within the next 6 months to a year.        Current Diabetes symptoms:    Polyuria: No   Polydipsia: No   Polyphagia: No   Blurred vision: No   Excessive fatigue: No  Known Diabetes complications:  Neuropathy: Numbness, Tingling, Shooting Pain and Temperature variation  "(hot or cold sensations); Location: Feet  Renal: No current urine microalbumin available and Stage IIIb moderate (GFR = 30-44 mL/min)  Eyes: Mild Nonproliferative Retinopathy and Without Macular Edema; Location: Bilateral; Date of Last Exam: 11/1/23  Amputation/Wounds: He has a pressure injury of the left heel and then a small pressure injury of the left foot (this is now healed); sacral wound which is healing  GI: None  Cardiovascular: Hypertension, Hyperlipidemia and CHF  ED: None  Other: Neurogenic bladder  Hypoglycemia:  None reported at this time  Hypoglycemia Symptoms:  No hypoglycemia at this time  Current diabetes treatment:   Farxiga 5 mg once a day, Glargine 15 units once a day and Humalog 5 units prior to each meal plus sliding scale taking 1 unit of insulin for every 25 points above a blood glucose level of 150.  He is taking up to 10 units at some meals.  He is also taking Ozempic 1 mg once weekly  Blood glucose device:  [employer] CGM  Blood glucose monitoring frequency:  Continuous per CGM  Blood glucose range/average:  The 14-day sensor report shows an average glucose of 192 mg/dL, with 49% in target range ( mgdL), 32% in the high range (181-250 mg/dL), 19% in the very high range (>250 mg/dL), 0% in the low range (54-70 mg/dL) and 0% in the very low range (<54 mg/dL).   Glucose Source: Device Reviewed  Diet:  Limits high carb/sweet foods, Avoids sugary drinks  Activity/Exercise:  None    Past Medical History:   Diagnosis Date    1. Incision and drainage of posterior perianal abscess 2. Sharp excisional debridement through skin and subcutaneous tissue in the posterior perianal area, 9 x 6 x 1 cm 01/26/2025    Age-related cognitive decline     Allergic contact dermatitis     Allergies     Anemia     Asthma     Bedbound     11/2023 \"MY LEG MUSCLES STOPPED WORKING\"    Bronchiectasis with acute lower respiratory infection     Charcot foot due to diabetes mellitus 09/10/2013    Chronic diastolic " "(congestive) heart failure     Chronic kidney disease     Chronic respiratory failure with hypoxia     Closed supracondylar fracture of femur 01/12/2022    COPD (chronic obstructive pulmonary disease)     Deep vein thrombosis (DVT) of lower extremity associated with air travel 01/13/2023    Dependence on supplemental oxygen     Eczema     Elevated cholesterol     Erectile dysfunction     due to organic reasons    Essential (primary) hypertension     Fracture     closed fracture of other tarsal and metatarsal bones    Fracture of proximal humerus 01/13/2023    GERD without esophagitis     High risk medication use     Hypercholesteremia     Hypomagnesemia     Infected stasis ulcer of left lower extremity 01/13/2023    Insomnia     Low back pain     Major depressive disorder     Morbid (severe) obesity due to excess calories     MRSA pneumonia     Muscle weakness     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 without evidence of necrosis     Obstructive sleep apnea (adult) (pediatric)     On home O2     REPORTS WEARING 2L/NC AAT    Other forms of dyspnea     Other long term (current) drug therapy     Other specified noninfective gastroenteritis and colitis     Other spondylosis, lumbar region     Pain in both knees     Paroxysmal atrial fibrillation     Peripheral neuropathy     attributed to type 2 diabetes    Pneumonia, unspecified organism     Polyneuropathy     Rash and other nonspecific skin eruption     Self-catheterizes urinary bladder     EVERY 4 HOURS    Smoking     \"SOMETIMES\"    Syncope and collapse     Tachycardia     Tinnitus 01/13/2023    Type 1 diabetes mellitus with diabetic chronic kidney disease     Type 2 diabetes mellitus     Unspecified fall, initial encounter     Urinary retention      Past Surgical History:   Procedure Laterality Date    BRONCHOSCOPY N/A 1/28/2025    Procedure: BRONCHOSCOPY: BAL: insertion of lighted instrument to view inside the lung;  " Surgeon: Walter Nicole DO;  Location: [facility] MAIN OR;  Service: Pulmonary;  Laterality: N/A;    BRONCHOSCOPY N/A 2/3/2025    Procedure: BRONCHOSCOPY WITH BRONCHOALVEOLAR LAVAGE, POSSIBLE BIOPSY, BRUSHING, WASHING, AIRWAY INSPECTION: insertion of lighted instrument to view inside the lung;  Surgeon: Chadd Swan MD;  Location: [facility] MAIN OR;  Service: Pulmonary;  Laterality: N/A;    CARDIAC CATHETERIZATION Left 8/15/2024    Procedure: Carbon dioxide aortogram with left leg angiogram, possible angioplasty or stenting;  Surgeon: Moshe Willson MD;  Location: [facility] CATH INVASIVE LOCATION;  Service: Vascular;  Laterality: Left;    CHOLECYSTECTOMY      COLOSTOMY N/A 2/6/2025    Procedure: COLOSTOMY LAPAROSCOPIC; plain text: creation of colostomy using small incisions;  Surgeon: Emerson Canada MD;  Location: [facility] OR OSC;  Service: General;  Laterality: N/A;    CYSTOSCOPY      ENDOSCOPY N/A 3/18/2025    Procedure: ESOPHAGOGASTRODUODENOSCOPY WITH BIOPSIES;  Surgeon: Rafael Hernandez MD;  Location: [facility] ENDOSCOPY;  Service: Gastroenterology;  Laterality: N/A;  HIATAL HERNIA, REFLUX ESOPHAGITIS    FEMUR SURGERY Left     Shravan placed    INCISION AND DRAINAGE ABSCESS N/A 1/26/2025    Procedure: INCISION AND DRAINAGE ABSCESS; plain text: incision and drain pus from buttocks wound;  Surgeon: Emerson Canada MD;  Location: [facility] OR OSC;  Service: General;  Laterality: N/A;    KNEE SURGERY Left     OTHER SURGICAL HISTORY Left     venous port, REMOVED    PORTACATH PLACEMENT Right     TIBIAL PLATEAU OPEN REDUCTION INTERNAL FIXATION Left 12/22/2023    Procedure: TIBIAL PLATEAU OPEN REDUCTION INTERNAL FIXATION;  Surgeon: Hugo Kline MD;  Location: [facility] MAIN OR;  Service: Orthopedics;  Laterality: Left;    TONSILLECTOMY AND ADENOIDECTOMY       Family History   Problem Relation Age of Onset    Coronary artery disease Mother     Hypertension Mother     Diabetes type II Mother     Asthma Father      Diabetes type II Sister     Cancer Sister     Deonna Hyperthermia Neg Hx     Colon cancer Neg Hx      Social History     Socioeconomic History    Marital status:    Tobacco Use    Smoking status: Former     Current packs/day: 0.00     Average packs/day: 1 pack/day for 12.0 years (12.0 ttl pk-yrs)     Types: Cigarettes     Start date:      Quit date:      Years since quittin.5     Passive exposure: Past    Smokeless tobacco: Never   Vaping Use    Vaping status: Never Used   Substance and Sexual Activity    Alcohol use: Not Currently    Drug use: Never    Sexual activity: Defer     Allergies   Allergen Reactions    Benadryl [Diphenhydramine] Itching    Proventil [Albuterol] Other (See Comments)     Mouth sores         Current Outpatient Medications:     amantadine (SYMMETREL) 100 MG tablet, , Disp: , Rfl:     apixaban (Eliquis) 5 MG tablet tablet, Take 1 tablet by mouth Every 12 (Twelve) Hours., Disp: 60 tablet, Rfl: 5    arformoterol (BROVANA) 15 MCG/2ML nebulizer solution, Take 2 mL by nebulization 2 (Two) Times a Day., Disp: 360 mL, Rfl: 3    aspirin (Aspirin Low Dose) 81 MG EC tablet, Take 1 tablet by mouth Every Morning., Disp: 30 tablet, Rfl: 5    atorvastatin (LIPITOR) 20 MG tablet, Take 1 tablet by mouth every night at bedtime., Disp: 30 tablet, Rfl: 5    budesonide (Pulmicort) 0.5 MG/2ML nebulizer solution, Take 2 mL by nebulization 2 (Two) Times a Day., Disp: 360 mL, Rfl: 3    busPIRone (BUSPAR) 15 MG tablet, Take 1 tablet by mouth 2 (Two) Times a Day., Disp: 60 tablet, Rfl: 5    calcium citrate (CALCITRATE) 950 (200 Ca) MG tablet, Take 1 tablet by mouth every night at bedtime., Disp: 30 tablet, Rfl: 5    Cholecalciferol (Vitamin D3) 50 MCG (2000 UT) tablet, Take 1 tablet by mouth Every Morning., Disp: 30 tablet, Rfl: 5    collagenase (Santyl) 250 UNIT/GM ointment, Apply 1 Application topically to the appropriate area as directed Daily., Disp: 30 g, Rfl: 1    Continuous Glucose Sensor  (FreeStyle Bethany 3 Plus Sensor), Use Every 15 (Fifteen) Days., Disp: 2 each, Rfl: 5    dapagliflozin (Farxiga) 5 MG tablet tablet, Take 1 tablet by mouth Every Morning., Disp: 30 tablet, Rfl: 5    dilTIAZem CD (CARDIZEM CD) 240 MG 24 hr capsule, Take 1 capsule by mouth Every Morning., Disp: 30 capsule, Rfl: 5    docusate sodium (COLACE) 100 MG capsule, Take 1 capsule by mouth 2 (Two) Times a Day., Disp: 60 capsule, Rfl: 5    DULoxetine (Cymbalta) 30 MG capsule, Take 1 capsule by mouth Daily., Disp: 30 capsule, Rfl: 5    ferrous gluconate (FERGON) 324 MG tablet, Take 1 tablet by mouth Every Morning., Disp: 30 tablet, Rfl: 5    finasteride (PROSCAR) 5 MG tablet, Take 1 tablet by mouth every night at bedtime., Disp: 30 tablet, Rfl: 5    folic acid (FOLVITE) 1 MG tablet, Take 1 tablet by mouth every night at bedtime., Disp: 30 tablet, Rfl: 5    Insulin Glargine (BASAGLAR KWIKPEN) 100 UNIT/ML injection pen, Inject 15 Units under the skin into the appropriate area as directed Daily for 180 days., Disp: 15 mL, Rfl: 1    Insulin Lispro, 1 Unit Dial, (HUMALOG) 100 UNIT/ML solution pen-injector, Inject 15 Units under the skin into the appropriate area as directed 3 (Three) Times a Day Before Meals for 180 days., Disp: 40 mL, Rfl: 1    ipratropium-albuterol (DUO-NEB) 0.5-2.5 mg/3 ml nebulizer, Take 3 mL by nebulization Every 4 (Four) Hours As Needed for Wheezing or Shortness of Air., Disp: 360 mL, Rfl: 5    Magnesium Oxide -Mg Supplement 400 (240 Mg) MG tablet, Take 1 tablet by mouth every night at bedtime., Disp: 30 tablet, Rfl: 5    Menthol-Zinc Oxide 0.44-20.6 % ointment, Apply 1 Application topically to the appropriate area as directed 2 (Two) Times a Day. With calamine, i.e. Calmoseptine, Disp: 113 g, Rfl: 1    montelukast (SINGULAIR) 10 MG tablet, Take 1 tablet by mouth Every Night., Disp: 30 tablet, Rfl: 5    Multiple Vitamins-Iron (Multi-Vitamin/Iron) tablet, Take 1 tablet by mouth Every Morning., Disp: 30 each, Rfl:  "5    Nutritional Supplements (Rosalino Nutrivigor) pack, Take 1 packet by mouth 2 (Two) Times a Day., Disp: 180 each, Rfl: 1    ondansetron ODT (ZOFRAN-ODT) 4 MG disintegrating tablet, Place 1 tablet on the tongue Every 8 (Eight) Hours As Needed for Nausea or Vomiting., Disp: 20 tablet, Rfl: 0    pantoprazole (PROTONIX) 40 MG EC tablet, Take 1 tablet by mouth Every Morning., Disp: 30 tablet, Rfl: 5    Povidone-Iodine (Betadine) 5 % external solution 5%, Apply 1 mL topically to the appropriate area as directed 2 (Two) Times a Day. Left Heel, Disp: , Rfl:     Semaglutide, 1 MG/DOSE, (Ozempic, 1 MG/DOSE,) 4 MG/3ML solution pen-injector, Inject 1 mg under the skin into the appropriate area as directed 1 (One) Time Per Week., Disp: 3 mL, Rfl: 5    silver sulfadiazine (SILVADENE, SSD) 1 % cream, Apply  topically to the appropriate area as directed., Disp: , Rfl:     sodium hypochlorite (DAKIN'S 1/4 STRENGTH) 0.125 % solution topical solution 0.125%, Apply 10 mL topically to the appropriate area as directed 2 (Two) Times a Day., Disp: 1000 mL, Rfl: 1    tamsulosin (FLOMAX) 0.4 MG capsule 24 hr capsule, Take 1 capsule by mouth every night at bedtime., Disp: 30 capsule, Rfl: 5    vitamin C (ASCORBIC ACID) 500 MG tablet, Take 1 tablet by mouth 2 (Two) Times a Day., Disp: 60 tablet, Rfl: 5    zinc sulfate (ZINCATE) 220 (50 Zn) MG capsule, Take 1 capsule by mouth Every Morning., Disp: 30 capsule, Rfl: 5    tobramycin PF (MOIZ) 300 MG/5ML nebulizer solution, nebulize 1 vial BY MOUTH TWICE DAILY FOR 28 DAYS ON AND 28 DAYS OFF (Patient not taking: Reported on 6/27/2025), Disp: , Rfl:     Objective     Vitals:    06/27/25 1059   BP: 167/66   BP Location: Left arm   Patient Position: Sitting   Cuff Size: Adult   Pulse: 89   SpO2: 95%   Weight: 111 kg (245 lb)   Height: 175.3 cm (69.02\")     Body mass index is 36.16 kg/m².    Physical Exam  Constitutional:       Appearance: Normal appearance. He is normal weight.   HENT:      Head: " Normocephalic and atraumatic.      Right Ear: External ear normal.      Left Ear: External ear normal.      Nose: Nose normal.   Eyes:      Extraocular Movements: Extraocular movements intact.      Conjunctiva/sclera: Conjunctivae normal.   Pulmonary:      Effort: Pulmonary effort is normal.   Musculoskeletal:         General: Normal range of motion.      Cervical back: Normal range of motion.   Skin:     General: Skin is warm and dry.   Neurological:      General: No focal deficit present.      Mental Status: He is alert and oriented to person, place, and time. Mental status is at baseline.   Psychiatric:         Mood and Affect: Mood normal.         Behavior: Behavior normal.         Thought Content: Thought content normal.         Judgment: Judgment normal.             Result Review :   The following data was reviewed by: CON Ashley on 06/27/2025:    Most Recent A1C          6/27/2025    11:16   HGBA1C Most Recent   Hemoglobin A1C 7.7        A1C Last 3 Results          12/11/2024    10:56 3/14/2025    11:16 6/27/2025    11:16   HGBA1C Last 3 Results   Hemoglobin A1C 8.8  7.0  7.7      A1c collected in the office today is 7.7%, indicating Uncontrolled Type II diabetes.  This result is up from the prior result of 7% collected on 3/14/25       Creatinine   Date Value Ref Range Status   05/06/2025 2.19 (H) 0.76 - 1.27 mg/dL Final   04/24/2025 2.03 (H) 0.76 - 1.27 mg/dL Final     eGFR   Date Value Ref Range Status   05/06/2025 33.8 (L) >60.0 mL/min/1.73 Final   04/24/2025 37.1 (L) >60.0 mL/min/1.73 Final     Labs collected on 5/6/25 show Stage IIIb moderate (GFR = 30-44 mL/min              Diagnoses and all orders for this visit:    1. Uncontrolled type 2 diabetes mellitus with hyperglycemia (Primary)  -     POC Glycosylated Hemoglobin (Hb A1C)  -     Insulin Lispro, 1 Unit Dial, (HUMALOG) 100 UNIT/ML solution pen-injector; Inject 15 Units under the skin into the appropriate area as directed 3 (Three)  Times a Day Before Meals for 180 days.  Dispense: 40 mL; Refill: 1    2. Type 2 diabetes mellitus with both eyes affected by mild nonproliferative retinopathy without macular edema, with long-term current use of insulin    3. Type 2 diabetes mellitus with stage 3b chronic kidney disease, with long-term current use of insulin    4. Type 2 diabetes mellitus with diabetic neuropathy, with long-term current use of insulin    5. Diabetic ulcer of left heel associated with type 2 diabetes mellitus, limited to breakdown of skin    6. Uses self-applied continuous glucose monitoring device        Assessment & Plan  1. Diabetes mellitus.  - Average glucose level is 192, with 49% of readings within the target range.  - A1c level has increased to 7.7 from the previous reading of 7.  - Kidney disease is stable at stage 3b.  - Humalog dosage will be adjusted prior to meals based on glucose and carbohydrates. Special attention to appropriated dosing before breakfast and lunch to minimize postprandial hyperglycemia. A prescription for Humalog was provided.    2. Sacral wound: Resolving.  - Significant improvement observed, with the wound healing well.  - Continued monitoring and care recommended to ensure complete healing.  - No new health issues reported.    3. Left foot wound: Resolving.  - Pressure injury on the left heel is healing.  - Patient can start putting a little weight on the foot as it continues to heal.  - One of the wounds has healed completely, while the other is healing well.            The patient will monitor his blood glucose levels per continuous glucose monitor.  If he develops problematic hyperglycemia or hypoglycemia or adverse drug reactions, he will contact the office for further instructions.        Follow Up     Return in about 3 months (around 9/27/2025) for Medication Management, CGM Follow-up.    Patient was given instructions and counseling regarding his condition or for health maintenance advice.  Please see specific information pulled into the AVS if appropriate.     Neli Paredes, APRN  06/27/2025        Dictated Utilizing Dragon Dictation.  Please note that portions of this note were completed with a voice recognition program.  Part of this note may be an electronic transcription/translation of spoken language to printed text using the Dragon Dictation System.

## 2025-06-27 NOTE — TELEPHONE ENCOUNTER
PATIENT'S WIFE, INGA, CALLED.  SHE THINKS PATIENT HAS UTI.  HE HAS CHILLS.  IT HURTS WHEN SHE CATHS HIM, AND THERE IS BLOOD IN THE URINE WHEN SHE CATHS.  HE FEELS LIKE HE HAS TO URINATE CONSTANTLY.    SHE REACHED OUT TO PCP TO SEE IF THEY COULD ORDER TEST, BUT PCP IS OUT.     #[phone]

## 2025-06-27 NOTE — TELEPHONE ENCOUNTER
I CALLED INGA AND TOLD HER, PER LILY:  Orders placed she can take a cath specimen to the lab any time

## 2025-06-27 NOTE — TELEPHONE ENCOUNTER
I spoke with wife and she declines appt here I advised that she needs to call urologist Kyra Natarajan his urologist or come in for an acute appt  today

## 2025-06-27 NOTE — TELEPHONE ENCOUNTER
Caller: Kami Wallis    Relationship: Emergency Contact    Best call back number: [phone]     What is the best time to reach you: ANYTIME     Who are you requesting to speak with (clinical staff, provider,  specific staff member): CLINICAL     What was the call regarding: PATIENT SPOUSE IS CALLING WOULD LIKE TO SEE IF PATIENT IS ABLE TO GIVE A URINE SAMPLE, FOR POSSIBLE UTI SYMPTOMS, DECLINE APPOINTMENT.

## 2025-06-30 ENCOUNTER — OFFICE VISIT (OUTPATIENT)
Dept: PULMONOLOGY | Facility: CLINIC | Age: 60
End: 2025-06-30
Payer: COMMERCIAL

## 2025-06-30 VITALS
BODY MASS INDEX: 36.16 KG/M2 | OXYGEN SATURATION: 98 % | TEMPERATURE: 98.3 F | SYSTOLIC BLOOD PRESSURE: 139 MMHG | HEART RATE: 87 BPM | DIASTOLIC BLOOD PRESSURE: 54 MMHG | RESPIRATION RATE: 16 BRPM | HEIGHT: 69 IN

## 2025-06-30 DIAGNOSIS — Z87.01 HISTORY OF PSEUDOMONAS PNEUMONIA: ICD-10-CM

## 2025-06-30 DIAGNOSIS — J47.9 BRONCHIECTASIS WITHOUT COMPLICATION: ICD-10-CM

## 2025-06-30 DIAGNOSIS — J96.11 CHRONIC RESPIRATORY FAILURE WITH HYPOXIA AND HYPERCAPNIA: ICD-10-CM

## 2025-06-30 DIAGNOSIS — R06.09 CHRONIC DYSPNEA: ICD-10-CM

## 2025-06-30 DIAGNOSIS — J15.1 PNEUMONIA DUE TO PSEUDOMONAS SPECIES, UNSPECIFIED LATERALITY, UNSPECIFIED PART OF LUNG: ICD-10-CM

## 2025-06-30 DIAGNOSIS — M25.511 RIGHT SHOULDER PAIN, UNSPECIFIED CHRONICITY: Primary | ICD-10-CM

## 2025-06-30 DIAGNOSIS — G47.33 OBSTRUCTIVE SLEEP APNEA: ICD-10-CM

## 2025-06-30 DIAGNOSIS — J96.12 CHRONIC RESPIRATORY FAILURE WITH HYPOXIA AND HYPERCAPNIA: ICD-10-CM

## 2025-06-30 DIAGNOSIS — J44.9 CHRONIC OBSTRUCTIVE PULMONARY DISEASE, UNSPECIFIED COPD TYPE: Primary | ICD-10-CM

## 2025-06-30 DIAGNOSIS — F17.201 TOBACCO ABUSE, IN REMISSION: ICD-10-CM

## 2025-06-30 DIAGNOSIS — Z86.711 PERSONAL HISTORY OF PE (PULMONARY EMBOLISM): ICD-10-CM

## 2025-06-30 LAB
BACTERIA SPEC AEROBE CULT: ABNORMAL
BACTERIA SPEC AEROBE CULT: ABNORMAL

## 2025-06-30 PROCEDURE — 1159F MED LIST DOCD IN RCRD: CPT | Performed by: NURSE PRACTITIONER

## 2025-06-30 PROCEDURE — 99214 OFFICE O/P EST MOD 30 MIN: CPT | Performed by: NURSE PRACTITIONER

## 2025-06-30 PROCEDURE — 1160F RVW MEDS BY RX/DR IN RCRD: CPT | Performed by: NURSE PRACTITIONER

## 2025-06-30 PROCEDURE — 3075F SYST BP GE 130 - 139MM HG: CPT | Performed by: NURSE PRACTITIONER

## 2025-06-30 PROCEDURE — 3078F DIAST BP <80 MM HG: CPT | Performed by: NURSE PRACTITIONER

## 2025-06-30 RX ORDER — METOPROLOL TARTRATE 50 MG
50 TABLET ORAL 2 TIMES DAILY
COMMUNITY

## 2025-06-30 RX ORDER — LEVOFLOXACIN 500 MG/1
500 TABLET, FILM COATED ORAL DAILY
Qty: 7 TABLET | Refills: 0 | Status: SHIPPED | OUTPATIENT
Start: 2025-06-30 | End: 2025-07-07

## 2025-06-30 RX ORDER — HYDRALAZINE HYDROCHLORIDE 25 MG/1
25 TABLET, FILM COATED ORAL 3 TIMES DAILY
COMMUNITY

## 2025-06-30 RX ORDER — FAMOTIDINE 20 MG/1
20 TABLET, FILM COATED ORAL NIGHTLY PRN
COMMUNITY

## 2025-06-30 RX ORDER — TOBRAMYCIN INHALATION 300 MG/4ML
SOLUTION RESPIRATORY (INHALATION) 2 TIMES DAILY
COMMUNITY

## 2025-06-30 RX ORDER — ALBUTEROL SULFATE 90 UG/1
2 INHALANT RESPIRATORY (INHALATION) EVERY 4 HOURS PRN
Qty: 54 G | Refills: 1 | Status: SHIPPED | OUTPATIENT
Start: 2025-06-30

## 2025-06-30 RX ORDER — ALBUTEROL SULFATE 90 UG/1
2 INHALANT RESPIRATORY (INHALATION) EVERY 4 HOURS PRN
COMMUNITY
End: 2025-06-30 | Stop reason: SDUPTHER

## 2025-06-30 RX ORDER — FERROUS SULFATE 325(65) MG
325 TABLET ORAL
COMMUNITY

## 2025-07-01 ENCOUNTER — TELEPHONE (OUTPATIENT)
Dept: FAMILY MEDICINE CLINIC | Age: 60
End: 2025-07-01

## 2025-07-01 NOTE — TELEPHONE ENCOUNTER
Mel mcghee/[facility] called stating that pt is wheezing and coughing, she states that his lungs sound pretty bad.  He started with a productive cough yesterday.  Since he goes into pneumonia pretty easy, she is asking if you would like to give him something.

## 2025-07-01 NOTE — TELEPHONE ENCOUNTER
I would recommend they contact his pulmonologist since he is such a difficult case with chronic Pseudomonas, he needs a chest x-ray, sputum culture and eval with urgent care or with our urgent care ARPN here if pulmonology is unable to help.  Dr. Riley

## 2025-07-02 ENCOUNTER — TELEPHONE (OUTPATIENT)
Age: 60
End: 2025-07-02

## 2025-07-02 NOTE — TELEPHONE ENCOUNTER
Spoke with patient regarding his appointment with Marion Broderick on 7/3/2025.  Patient will need to have x-ray performed prior to being seen as our x-ray in office is not available.  Patient can go to any [facility] facility - [facility] - beside [place] to have x-rays performed.   Patient and his wife understood this however, patient uses [employer] transportation and they will not take him to more than one location per trip. They will not take to [facility] by [employer] for x-rays and then bring to his appointment at our office.    [employer] also requires 3 day notice for all transportation trips.     Patient is going to contact [employer] to see if he can still have x-rays performed tomorrow and appointment can then be rescheduled for next week when he can give [employer] the required 3 day notice.      Patient will contact office back and let us know what [employer] transportation allows him to do.      wnt

## 2025-07-03 ENCOUNTER — HOSPITAL ENCOUNTER (OUTPATIENT)
Dept: GENERAL RADIOLOGY | Facility: HOSPITAL | Age: 60
Discharge: HOME | End: 2025-07-03
Admitting: PHYSICIAN ASSISTANT
Payer: COMMERCIAL

## 2025-07-03 DIAGNOSIS — M25.511 RIGHT SHOULDER PAIN, UNSPECIFIED CHRONICITY: ICD-10-CM

## 2025-07-03 PROCEDURE — 73030 X-RAY EXAM OF SHOULDER: CPT

## 2025-07-10 ENCOUNTER — PATIENT OUTREACH (OUTPATIENT)
Dept: CASE MANAGEMENT | Facility: OTHER | Age: 60
End: 2025-07-10
Payer: MEDICAID

## 2025-07-10 DIAGNOSIS — E11.65 UNCONTROLLED TYPE 2 DIABETES MELLITUS WITH HYPERGLYCEMIA, WITH LONG-TERM CURRENT USE OF INSULIN: ICD-10-CM

## 2025-07-10 DIAGNOSIS — Z71.89 MEDICATION CARE PLAN DISCUSSED WITH PATIENT: Primary | ICD-10-CM

## 2025-07-10 DIAGNOSIS — Z79.4 UNCONTROLLED TYPE 2 DIABETES MELLITUS WITH HYPERGLYCEMIA, WITH LONG-TERM CURRENT USE OF INSULIN: ICD-10-CM

## 2025-07-10 NOTE — OUTREACH NOTE
"AMBULATORY CASE MANAGEMENT NOTE    Names and Relationships of Patient/Support Persons: Contact: Preston Wallis \"Gómez\"; Relationship: Self -     Reached out to Elizabeth to follow up on the shoulder/ ortho referral.  She reports that she had to cancel due to not scheduling transportation in enough time.   Together we rescheduled for next week.   PT is working with Gómez and he was able to stand on his own.  Great progress.   Nursing is discharging him due to his wound has healed.    He is scheduled for a CT in August for his chronic lung disease.    They would like to keep the appointment next week with PCP to discuss his neuropathy.    Medications brought in and reviewed.  Was able to put in planners to assist.      Tara GOMEZ  Ambulatory Case Management    7/10/2025, 11:49 EDT  "

## 2025-07-16 ENCOUNTER — OFFICE VISIT (OUTPATIENT)
Dept: FAMILY MEDICINE CLINIC | Age: 60
End: 2025-07-16
Payer: MEDICAID

## 2025-07-16 ENCOUNTER — HOSPITAL ENCOUNTER (OUTPATIENT)
Dept: GENERAL RADIOLOGY | Facility: HOSPITAL | Age: 60
Discharge: HOME | End: 2025-07-16
Admitting: FAMILY MEDICINE
Payer: MEDICAID

## 2025-07-16 ENCOUNTER — TELEPHONE (OUTPATIENT)
Dept: FAMILY MEDICINE CLINIC | Age: 60
End: 2025-07-16

## 2025-07-16 VITALS
TEMPERATURE: 97.3 F | OXYGEN SATURATION: 97 % | BODY MASS INDEX: 36.16 KG/M2 | DIASTOLIC BLOOD PRESSURE: 59 MMHG | SYSTOLIC BLOOD PRESSURE: 158 MMHG | HEIGHT: 69 IN | HEART RATE: 90 BPM

## 2025-07-16 DIAGNOSIS — I10 ESSENTIAL HYPERTENSION: ICD-10-CM

## 2025-07-16 DIAGNOSIS — M79.672 LEFT FOOT PAIN: ICD-10-CM

## 2025-07-16 DIAGNOSIS — E78.2 MIXED HYPERLIPIDEMIA: ICD-10-CM

## 2025-07-16 DIAGNOSIS — E11.51 TYPE 2 DIABETES MELLITUS WITH DIABETIC PERIPHERAL ANGIOPATHY WITHOUT GANGRENE, WITH LONG-TERM CURRENT USE OF INSULIN: Chronic | ICD-10-CM

## 2025-07-16 DIAGNOSIS — Z12.5 PROSTATE CANCER SCREENING: ICD-10-CM

## 2025-07-16 DIAGNOSIS — F32.4 MAJOR DEPRESSIVE DISORDER, SINGLE EPISODE, IN PARTIAL REMISSION: Chronic | ICD-10-CM

## 2025-07-16 DIAGNOSIS — Z00.00 ANNUAL PHYSICAL EXAM: Primary | ICD-10-CM

## 2025-07-16 DIAGNOSIS — E55.9 VITAMIN D DEFICIENCY: ICD-10-CM

## 2025-07-16 DIAGNOSIS — G62.9 POLYNEUROPATHY: ICD-10-CM

## 2025-07-16 DIAGNOSIS — D50.8 IRON DEFICIENCY ANEMIA SECONDARY TO INADEQUATE DIETARY IRON INTAKE: ICD-10-CM

## 2025-07-16 DIAGNOSIS — Z79.4 TYPE 2 DIABETES MELLITUS WITH DIABETIC PERIPHERAL ANGIOPATHY WITHOUT GANGRENE, WITH LONG-TERM CURRENT USE OF INSULIN: Chronic | ICD-10-CM

## 2025-07-16 DIAGNOSIS — N40.1 BENIGN PROSTATIC HYPERPLASIA WITH NOCTURIA: ICD-10-CM

## 2025-07-16 DIAGNOSIS — I48.0 PAROXYSMAL ATRIAL FIBRILLATION: ICD-10-CM

## 2025-07-16 DIAGNOSIS — R82.90 CLOUDY URINE: ICD-10-CM

## 2025-07-16 DIAGNOSIS — G47.33 OBSTRUCTIVE SLEEP APNEA: ICD-10-CM

## 2025-07-16 DIAGNOSIS — Z12.11 COLON CANCER SCREENING: ICD-10-CM

## 2025-07-16 DIAGNOSIS — N18.31 STAGE 3A CHRONIC KIDNEY DISEASE: ICD-10-CM

## 2025-07-16 DIAGNOSIS — Z23 ENCOUNTER FOR IMMUNIZATION: ICD-10-CM

## 2025-07-16 DIAGNOSIS — R35.1 BENIGN PROSTATIC HYPERPLASIA WITH NOCTURIA: ICD-10-CM

## 2025-07-16 DIAGNOSIS — F51.01 PRIMARY INSOMNIA: ICD-10-CM

## 2025-07-16 DIAGNOSIS — M25.511 RIGHT SHOULDER PAIN, UNSPECIFIED CHRONICITY: ICD-10-CM

## 2025-07-16 LAB
AMORPH URATE CRY URNS QL MICRO: ABNORMAL /HPF
BACTERIA UR QL AUTO: ABNORMAL /HPF
BILIRUB UR QL STRIP: NEGATIVE
CLARITY UR: ABNORMAL
COLOR UR: YELLOW
GLUCOSE UR STRIP-MCNC: ABNORMAL MG/DL
HGB UR QL STRIP.AUTO: ABNORMAL
KETONES UR QL STRIP: NEGATIVE
LEUKOCYTE ESTERASE UR QL STRIP.AUTO: NEGATIVE
NITRITE UR QL STRIP: NEGATIVE
PH UR STRIP.AUTO: 6 [PH] (ref 5–8)
PROT UR QL STRIP: ABNORMAL
RBC # UR STRIP: ABNORMAL /HPF
REF LAB TEST METHOD: ABNORMAL
SP GR UR STRIP: 1.02 (ref 1–1.03)
SQUAMOUS #/AREA URNS HPF: ABNORMAL /HPF
UROBILINOGEN UR QL STRIP: ABNORMAL
WBC # UR STRIP: ABNORMAL /HPF

## 2025-07-16 PROCEDURE — 3078F DIAST BP <80 MM HG: CPT | Performed by: FAMILY MEDICINE

## 2025-07-16 PROCEDURE — 3051F HG A1C>EQUAL 7.0%<8.0%: CPT | Performed by: FAMILY MEDICINE

## 2025-07-16 PROCEDURE — 81001 URINALYSIS AUTO W/SCOPE: CPT | Performed by: FAMILY MEDICINE

## 2025-07-16 PROCEDURE — 1125F AMNT PAIN NOTED PAIN PRSNT: CPT | Performed by: FAMILY MEDICINE

## 2025-07-16 PROCEDURE — 99396 PREV VISIT EST AGE 40-64: CPT | Performed by: FAMILY MEDICINE

## 2025-07-16 PROCEDURE — 3077F SYST BP >= 140 MM HG: CPT | Performed by: FAMILY MEDICINE

## 2025-07-16 PROCEDURE — 87086 URINE CULTURE/COLONY COUNT: CPT | Performed by: FAMILY MEDICINE

## 2025-07-16 PROCEDURE — 73630 X-RAY EXAM OF FOOT: CPT

## 2025-07-16 NOTE — ASSESSMENT & PLAN NOTE
He is stable on Proscar and Flomax and tolerates well.  He is getting in and out caths due to urinary retention and sees urology

## 2025-07-16 NOTE — ASSESSMENT & PLAN NOTE
Stable and well-controlled on Ozempic and long-acting insulin.  He is not very good at following a low carbohydrate diet.  Encouraged him to eat healthier and be more active.  He is finally standing independently and trying to walk again with physical therapy    Orders:    Hemoglobin A1c; Future    Microalbumin / Creatinine Urine Ratio - Urine, Clean Catch; Future

## 2025-07-16 NOTE — ASSESSMENT & PLAN NOTE
He is not sleeping well due to pain in his left foot.  Will proceed with x-ray of the left foot since he has poor feeling in his feet due to diabetic neuropathy to further eval if he needs to be treated for fracture.  This may be peripheral neuropathy and if it is I will get him started on lyrica

## 2025-07-16 NOTE — ASSESSMENT & PLAN NOTE
He is in normal sinus rhythm today.  He is to follow-up with cardiology as directed and continue current meds

## 2025-07-16 NOTE — ASSESSMENT & PLAN NOTE
Acute left pain with no known injury but he has significant bruising on the left side of his foot.  Will proceed with x-ray of left foot  Orders:    XR Foot 3+ View Left; Future

## 2025-07-16 NOTE — ASSESSMENT & PLAN NOTE
Blood pressure is not at goal but home blood pressure checks with home health have been normal.  No changes to current meds or treatment plan at this time    Orders:    Comprehensive Metabolic Panel; Future    CBC (No Diff); Future

## 2025-07-16 NOTE — ASSESSMENT & PLAN NOTE
Currently on iron supplementation.  Will recheck labs and adjust Tx plan pending results  Orders:    Iron Profile w/o Ferritin; Future

## 2025-07-16 NOTE — ASSESSMENT & PLAN NOTE
Did not tolerate CPAP and he has sleeping upright in his hospital bed and is doing well with this

## 2025-07-16 NOTE — ASSESSMENT & PLAN NOTE
Cholesterol is well controlled on current medication and treatment plan, tolerates meds well, no changes are needed to current meds or tx plan, will check appropriate labs today.  Encourage healthy diet and daily exercise    Orders:    Lipid Panel; Future

## 2025-07-16 NOTE — PROGRESS NOTES
Preston Wallis presents to [facility].    Chief Complaint: Annual physical    Subjective     History of Present Illness:  HPI    Patient is in today for yearly physical.  Last colonoscopy:  last EGD/colonoscopy performed 9/8/2022 with Dr. Poncho Engel showed 3 to + gastritis, poor colon prep, 8 mm polyp at the hepatic flexure  Last PSA: 0.206 tested on 7/18/2024  Urinary symptoms: cloudy urine, he gets cathed  Nocturia: 1-2 x a night  Sexual concerns:  no issues  EtOH use: None  Tobacco use: None  He wears a seatbelt in the car  Drug use: None  Last eye exam:  Summer 2024-[facility] eye care  Dental exams: done-false teeth  IMMUNIZATIONS:  COVID BOOSTER, FLU in Fall   DIET: diabetic  EXERCISE: working with PT  HEARING: no issues    I am seeing Gómez today for recent hospitalization at [facility].  He is admitted on 4/22/2025 and discharged on 4/24/2025.  He was admitted due to nausea and vomiting with an elevated white count indicative of infection.  Workup was performed and given his GI symptoms it was suspected he had an intra-abdominal source of infection.  He was given IV vancomycin and Zosyn and is leukocytes improved.  Blood cultures were negative at discharge.  He was discharged home on ciprofloxacin and Flagyl for 7 more days.  While in the hospital he also had acute on chronic CHF exacerbation.  He was treated with Lasix 40 mg IV twice daily and was -6 L at the time of discharge.  He also presented with a wound to the left heel and coccyx.  He is under treatment with wound care Ermelinda Fitzgerald.  Wounds are being treated with Santyl and DAC and solution and the sacral wound is being managed with daily cleaning and application of Aquacel, which is then covered.  Home health is coming to see him.  He is nonambulatory and needs full assistance with standing and transfers.      Gómez has type 2 diabetes mellitus with diabetic peripheral angiopathy without gangrene, neurogenic  bladder, and peripheral neuropathy in his on long-term current use of insulin Basaglar 15 units QHS  He is also on Farxiga 5 mg daily and Ozempic 1 mg weekly and he is tolerating medication well.  Checks BS 1-4 x a day, running 147.     He has chronic hypertension  he is on diltiazem 240 mg xr daily    He presents with benign prostate hypertrophy and self caths.  He is currently on Proscar and tamsulosin and sees urology.    He has chronic paroxysmal atrial fibrillation and he sees cardiology.  Current treatment includes Eliquis 5 mg twice daily and diltiazem 240 mg XR daily    He has chronic COPD and continues pulmonary toilet with Brovana nebulizer, DuoNebs, budesonide nebs, MOIZ nebulizer, his chronic allergies are stable on Singulair    He has chronic hypercholesterolemia, current treatment is atorvastatin 20 mg daily and he tolerates med well.  He is also to follow a low cholesterol daily    He presents with chronic GERD which is stable on pantoprazole.     In addition he is on magnesium supplementation, vitamin D3, Rosalino, vitamin C, calcium, folic acid and zinc and iron    He has chronic anxiety and is currently stable on cymbalta and BuSpar 15 mg twice daily    He is on RADHA stool softeners for chronic constipation      Stage 3b kidney insufficiency, Cr 2.19, GFR 33, sees Dr Marx this past April, f/u appt in 10/25    He is not sleeping at night due to left foot pain, there is a bruise and he doesn't know how it got there,    He has ongoing R shoulder pain with sign decreased ROM and moderate pain, sees ortho this Friday, xray results showed:   XR SHOULDER 2+ VW RIGHT  Date of Exam: 7/3/2025 11:23 AM EDT  Indication: Right shoulder pain  Findings:  Deformity of the proximal right humerus again noted with remote fracture and associated advanced degenerative change involving the glenohumeral joint. Moderate to advanced degenerative changes are noted at the AC joint. Right-sided port is in place.   Additional  catheter projects over the mid mediastinum extending to the SVC. Airspace opacity in the mid and lower lung zones are noted.  IMPRESSION:  1. Advanced degenerative changes of the right shoulder with remote healed fracture with persistent foreshortening/impaction. Degenerative changes have progressed in the comparison  2. Patchy airspace opacities in the right midlung zone again noted. Please refer to CT from 4/22/2025 of the chest.      Result Review   The following data was reviewed by Kimmy Riley MD on 05/06/2025.  Lab Results   Component Value Date    WBC 12.07 (H) 05/06/2025    HGB 9.6 (L) 05/06/2025    HCT 31.8 (L) 05/06/2025    MCV 91.4 05/06/2025     05/06/2025     Lab Results   Component Value Date    GLUCOSE 492 (C) 05/06/2025    BUN 25 (H) 05/06/2025    CREATININE 2.19 (H) 05/06/2025     05/06/2025    K 4.0 05/06/2025    CL 94 (L) 05/06/2025    CALCIUM 9.4 05/06/2025    PROTEINTOT 7.7 05/06/2025    ALBUMIN 3.1 (L) 05/06/2025    ALT 26 05/06/2025    AST 55 (H) 05/06/2025    ALKPHOS 220 (H) 05/06/2025    BILITOT <0.2 05/06/2025    GLOB 4.6 05/06/2025    AGRATIO 0.7 05/06/2025    BCR 11.4 05/06/2025    ANIONGAP 15.6 (H) 05/06/2025    EGFR 33.8 (L) 05/06/2025     Lab Results   Component Value Date    CHOL 116 01/25/2025    CHLPL 165 08/26/2020    TRIG 49 01/25/2025    HDL 54 01/25/2025    LDL 50 01/25/2025     Lab Results   Component Value Date    TSH 1.497 04/13/2024     Lab Results   Component Value Date    HGBA1C 7.7 (A) 06/27/2025     Lab Results   Component Value Date    PSA 0.206 07/18/2024    PSA 0.203 05/25/2023    PSA 0.725 03/17/2022     Lab Results   Component Value Date    Iron 50 (L) 05/06/2025    Iron Saturation 13 (L) 03/17/2020    Iron Saturation (TSAT) 24 05/06/2025      Lab Results   Component Value Date    IKOT55QM 41.4 07/18/2024               Assessment and Plan:   Assessment & Plan  Annual physical exam         Encounter for immunization  Recommend flu and  COVID-vaccine in the fall       Colon cancer screening  He is due for repeat colon cancer screening in 2027       Prostate cancer screening  PSA ordered today  Orders:    PSA Screen; Future    Cloudy urine  We will send urine for culture.  Micro urine is not impressive for UTI  Orders:    Urinalysis With Culture If Indicated -; Future    Urinalysis, Microscopic Only - Urine, Clean Catch    Urine Culture - Urine, Urine, Clean Catch    Type 2 diabetes mellitus with diabetic peripheral angiopathy without gangrene, with long-term current use of insulin  Stable and well-controlled on Ozempic and long-acting insulin.  He is not very good at following a low carbohydrate diet.  Encouraged him to eat healthier and be more active.  He is finally standing independently and trying to walk again with physical therapy    Orders:    Hemoglobin A1c; Future    Microalbumin / Creatinine Urine Ratio - Urine, Clean Catch; Future    Major depressive disorder, single episode, in partial remission  Stable and well controlled on cymbalta, he tolerates med well         Paroxysmal atrial fibrillation  He is in normal sinus rhythm today.  He is to follow-up with cardiology as directed and continue current meds       Polyneuropathy  He is not sleeping well due to pain in his left foot.  Will proceed with x-ray of the left foot since he has poor feeling in his feet due to diabetic neuropathy to further eval if he needs to be treated for fracture.  This may be peripheral neuropathy and if it is I will get him started on lyrica       Left foot pain  Acute left pain with no known injury but he has significant bruising on the left side of his foot.  Will proceed with x-ray of left foot  Orders:    XR Foot 3+ View Left; Future    Obstructive sleep apnea  Did not tolerate CPAP and he has sleeping upright in his hospital bed and is doing well with this       Primary insomnia  He is not sleeping well due to pain in his left foot.  Will proceed with  x-ray of the left foot since he has poor feeling in his feet due to diabetic neuropathy to further eval if he needs to be treated for fracture.  This may be peripheral neuropathy and if it is I will get him started on lyrica       Stage 3a chronic kidney disease  He is to avoid NSAIDs and push fluids 64 ounces a day         Iron deficiency anemia secondary to inadequate dietary iron intake  Currently on iron supplementation.  Will recheck labs and adjust Tx plan pending results  Orders:    Iron Profile w/o Ferritin; Future    Vitamin D deficiency  Currently on vitamin D supplementation to 2000 units daily.  Will check labs and adjust Tx plan pending results  Orders:    Vitamin D,25-Hydroxy; Future    Benign prostatic hyperplasia with nocturia  He is stable on Proscar and Flomax and tolerates well.  He is getting in and out caths due to urinary retention and sees urology       Right shoulder pain, unspecified chronicity  He is to follow-up with orthopedics for further evaluation.  I believe he is going to need an MRI to further eval his current pain because his x-ray shows no acute fracture and shows a remote healed fracture with persistent foreshortening and impaction in the right shoulder.  XR SHOULDER 2+ VW RIGHT  Date of Exam: 7/3/2025 11:23 AM EDT  Indication: Right shoulder pain  Findings:  Deformity of the proximal right humerus again noted with remote fracture and associated advanced degenerative change involving the glenohumeral joint. Moderate to advanced degenerative changes are noted at the AC joint. Right-sided port is in place.   Additional catheter projects over the mid mediastinum extending to the SVC. Airspace opacity in the mid and lower lung zones are noted.  IMPRESSION:  1. Advanced degenerative changes of the right shoulder with remote healed fracture with persistent foreshortening/impaction. Degenerative changes have progressed in the comparison  2. Patchy airspace opacities in the right midlung  zone again noted. Please refer to CT from 4/22/2025 of the chest.       Essential hypertension  Blood pressure is not at goal but home blood pressure checks with home health have been normal.  No changes to current meds or treatment plan at this time    Orders:    Comprehensive Metabolic Panel; Future    CBC (No Diff); Future    Mixed hyperlipidemia  Cholesterol is well controlled on current medication and treatment plan, tolerates meds well, no changes are needed to current meds or tx plan, will check appropriate labs today.  Encourage healthy diet and daily exercise    Orders:    Lipid Panel; Future               Objective     Medications:  Current Outpatient Medications   Medication Instructions    albuterol sulfate  (90 Base) MCG/ACT inhaler 2 puffs, Inhalation, Every 4 Hours PRN    amantadine (SYMMETREL) 100 MG tablet     apixaban (ELIQUIS) 5 mg, Oral, Every 12 Hours Scheduled    arformoterol (BROVANA) 15 mcg, Nebulization, 2 Times Daily - RT    aspirin (ASPIRIN LOW DOSE) 81 mg, Oral, Every Morning    atorvastatin (LIPITOR) 20 mg, Oral, Every Night at Bedtime    BASAGLAR KWIKPEN 15 Units, Subcutaneous, Daily    budesonide (PULMICORT) 0.5 mg, Nebulization, 2 Times Daily    busPIRone (BUSPAR) 15 mg, Oral, 2 Times Daily    calcium citrate (CALCITRATE) 950 mg, Oral, Every Night at Bedtime    collagenase (Santyl) 250 UNIT/GM ointment 1 Application, Topical, Daily    Continuous Glucose Sensor (FreeStyle Bethany 3 Plus Sensor) Not Applicable, Every 15 Days    dapagliflozin (FARXIGA) 5 mg, Oral, Every Morning    dilTIAZem CD (CARDIZEM CD) 240 mg, Oral, Every Morning    docusate sodium (COLACE) 100 mg, Oral, 2 Times Daily    DULoxetine (CYMBALTA) 30 mg, Oral, Daily    famotidine (PEPCID) 20 mg, Nightly PRN    ferrous gluconate (FERGON) 324 mg, Oral, Every Morning    ferrous sulfate 325 mg, Daily With Breakfast    finasteride (PROSCAR) 5 mg, Oral, Every Night at Bedtime    fluconazole (DIFLUCAN) 150 mg, Oral, Daily  "   folic acid (FOLVITE) 1,000 mcg, Oral, Every Night at Bedtime    hydrALAZINE (APRESOLINE) 25 mg, 3 Times Daily    Insulin Lispro (1 Unit Dial) (HUMALOG) 15 Units, Subcutaneous, 3 Times Daily Before Meals    ipratropium-albuterol (DUO-NEB) 0.5-2.5 mg/3 ml nebulizer 3 mL, Nebulization, Every 4 Hours PRN    Magnesium Oxide -Mg Supplement 400 mg, Oral, Every Night at Bedtime    Menthol-Zinc Oxide 0.44-20.6 % ointment 1 Application, Topical, 2 Times Daily, With calamine, i.e. Calmoseptine    metoprolol tartrate (LOPRESSOR) 50 mg, 2 Times Daily    montelukast (SINGULAIR) 10 mg, Oral, Nightly    Multiple Vitamins-Iron (Multi-Vitamin/Iron) tablet 1 tablet, Oral, Every Morning    Nutritional Supplements (Rosalino Nutrivigor) pack 1 packet, Oral, 2 Times Daily    ondansetron ODT (ZOFRAN-ODT) 4 mg, Translingual, Every 8 Hours PRN    Ozempic (1 MG/DOSE) 1 mg, Subcutaneous, Weekly    pantoprazole (PROTONIX) 40 mg, Oral, Every Early Morning    Povidone-Iodine (Betadine) 5 % external solution 5% 1 Application, 2 Times Daily    silver sulfadiazine (SILVADENE, SSD) 1 % cream Apply  topically to the appropriate area as directed.    sodium hypochlorite (DAKIN'S 1/4 STRENGTH) 0.125 % solution topical solution 0.125% 10 mL, Topical, 2 Times Daily    tamsulosin (FLOMAX) 0.4 mg, Oral, Every Night at Bedtime    Tobramycin 300 MG/4ML nebulizer solution 2 Times Daily    vitamin C (ASCORBIC ACID) 500 mg, Oral, 2 Times Daily    Vitamin D3 50 mcg, Oral, Every Morning    zinc sulfate (ZINCATE) 220 mg, Oral, Every Morning        Vital Signs:   /59 (BP Location: Left arm, Patient Position: Sitting)   Pulse 90   Temp 97.3 °F (36.3 °C) (Temporal)   Ht 175.3 cm (69.02\")   SpO2 97% Comment: 2LPM  BMI 36.16 kg/m²           BP Readings from Last 3 Encounters:   07/16/25 158/59   06/30/25 139/54   06/27/25 167/66      Wt Readings from Last 3 Encounters:   06/27/25 111 kg (245 lb)   06/06/25 104 kg (230 lb)   04/24/25 104 kg (230 lb 6.1 oz)    "     Physical Exam:  Physical Exam  Vitals and nursing note reviewed.   Constitutional:       General: He is not in acute distress.     Appearance: Normal appearance. He is not ill-appearing, toxic-appearing or diaphoretic.   HENT:      Head: Normocephalic and atraumatic.      Right Ear: Tympanic membrane, ear canal and external ear normal.      Left Ear: Tympanic membrane, ear canal and external ear normal.      Nose: No congestion or rhinorrhea.      Mouth/Throat:      Mouth: Mucous membranes are moist.      Pharynx: Oropharynx is clear. No oropharyngeal exudate or posterior oropharyngeal erythema.   Eyes:      Extraocular Movements: Extraocular movements intact.      Conjunctiva/sclera: Conjunctivae normal.      Pupils: Pupils are equal, round, and reactive to light.   Cardiovascular:      Rate and Rhythm: Normal rate and regular rhythm.      Pulses:           Dorsalis pedis pulses are 1+ on the right side and 1+ on the left side.      Heart sounds: Normal heart sounds.   Pulmonary:      Effort: Pulmonary effort is normal.      Breath sounds: Normal breath sounds. No wheezing, rhonchi or rales.   Abdominal:      General: Abdomen is flat.      Palpations: Abdomen is soft. There is no mass.      Tenderness: There is no abdominal tenderness.      Hernia: No hernia is present.   Musculoskeletal:      Cervical back: Neck supple. No rigidity.      Right lower leg: No edema.      Left lower leg: No edema.        Feet:    Feet:      Right foot:      Protective Sensation: 7 sites tested.  0 sites sensed.      Skin integrity: Callus present. No ulcer or blister.      Toenail Condition: Right toenails are abnormally thick.      Left foot:      Protective Sensation: 7 sites tested.  7 sites sensed.      Skin integrity: Ulcer and callus present. No blister.      Toenail Condition: Left toenails are abnormally thick.      Comments: Diabetic Foot Exam Performed and Monofilament Test Performed     Lymphadenopathy:      Cervical:  No cervical adenopathy.   Skin:     General: Skin is warm and dry.   Neurological:      Mental Status: He is alert and oriented to person, place, and time. Mental status is at baseline.      Motor: Weakness present.      Gait: Gait abnormal.   Psychiatric:         Mood and Affect: Mood normal.         Behavior: Behavior normal.         Thought Content: Thought content normal.         Judgment: Judgment normal.           Review of Systems:  Review of Systems   Constitutional:  Negative for chills and fever.   HENT:  Negative for congestion, ear discharge and sore throat.    Respiratory:  Negative for cough, shortness of breath and wheezing.    Cardiovascular:  Negative for chest pain, palpitations and leg swelling.   Gastrointestinal:  Negative for abdominal pain, constipation, diarrhea, nausea, vomiting and GERD.   Genitourinary:  Negative for flank pain.   Musculoskeletal:  Positive for arthralgias and back pain.   Skin:  Positive for skin lesions.   Neurological:  Positive for weakness. Negative for dizziness and headache.   Psychiatric/Behavioral:  Negative for sleep disturbance and suicidal ideas.               Follow Up   Return in about 3 months (around 10/16/2025) for Recheck.    Part of this note may be an electronic transcription/translation of spoken language to printed   text using the Dragon Dictation System.              Health Maintenance   Topic Date Due    URINE MICROALBUMIN-CREATININE RATIO (uACR)  07/27/2022    DIABETIC EYE EXAM  11/01/2024    INFLUENZA VACCINE  10/01/2025    HEMOGLOBIN A1C  12/27/2025    LIPID PANEL  01/25/2026    ANNUAL PHYSICAL  07/16/2026    DIABETIC FOOT EXAM  07/16/2026    COLORECTAL CANCER SCREENING  09/08/2027    TDAP/TD VACCINES (2 - Td or Tdap) 09/18/2028    HEPATITIS C SCREENING  Completed    COVID-19 Vaccine  Completed    Pneumococcal Vaccine 50+  Completed    ZOSTER VACCINE  Completed    Hepatitis B  Discontinued          Medical History:  There are no discontinued  "medications.     Past Medical History:    1. Incision and drainage of posterior perianal abscess 2. Sharp excisional debridement through skin and subcutaneous tissue in the posterior perianal area, 9 x 6 x 1 cm    Age-related cognitive decline    Allergic contact dermatitis    Allergies    Anemia    Asthma    Bedbound    11/2023 \"MY LEG MUSCLES STOPPED WORKING\"    Bronchiectasis with acute lower respiratory infection    Charcot foot due to diabetes mellitus    Chronic diastolic (congestive) heart failure    Chronic kidney disease    Chronic respiratory failure with hypoxia    Closed supracondylar fracture of femur    COPD (chronic obstructive pulmonary disease)    Deep vein thrombosis (DVT) of lower extremity associated with air travel    Dependence on supplemental oxygen    Eczema    Elevated cholesterol    Erectile dysfunction    due to organic reasons    Essential (primary) hypertension    Fracture    closed fracture of other tarsal and metatarsal bones    Fracture of proximal humerus    GERD without esophagitis    High risk medication use    Hypercholesteremia    Hypomagnesemia    Infected stasis ulcer of left lower extremity    Insomnia    Low back pain    Major depressive disorder    Morbid (severe) obesity due to excess calories    MRSA pneumonia    Muscle weakness    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 without evidence of necrosis    Obstructive sleep apnea (adult) (pediatric)    On home O2    REPORTS WEARING 2L/NC AAT    Other forms of dyspnea    Other long term (current) drug therapy    Other specified noninfective gastroenteritis and colitis    Other spondylosis, lumbar region    Pain in both knees    Paroxysmal atrial fibrillation    Peripheral neuropathy    attributed to type 2 diabetes    Pneumonia, unspecified organism    Polyneuropathy    Rash and other nonspecific skin eruption    Self-catheterizes urinary bladder    EVERY 4 HOURS    Smoking    " "\"SOMETIMES\"    Syncope and collapse    Tachycardia    Tinnitus    Type 1 diabetes mellitus with diabetic chronic kidney disease    Type 2 diabetes mellitus    Unspecified fall, initial encounter    Urinary retention     Past Surgical History:    BRONCHOSCOPY    Procedure: BRONCHOSCOPY: BAL: insertion of lighted instrument to view inside the lung;  Surgeon: Walter Nicole DO;  Location: [facility] MAIN OR;  Service: Pulmonary;  Laterality: N/A;    BRONCHOSCOPY    Procedure: BRONCHOSCOPY WITH BRONCHOALVEOLAR LAVAGE, POSSIBLE BIOPSY, BRUSHING, WASHING, AIRWAY INSPECTION: insertion of lighted instrument to view inside the lung;  Surgeon: Chadd Swan MD;  Location: [facility] MAIN OR;  Service: Pulmonary;  Laterality: N/A;    CARDIAC CATHETERIZATION    Procedure: Carbon dioxide aortogram with left leg angiogram, possible angioplasty or stenting;  Surgeon: Moshe Willson MD;  Location: [facility] CATH INVASIVE LOCATION;  Service: Vascular;  Laterality: Left;    CHOLECYSTECTOMY    COLOSTOMY    Procedure: COLOSTOMY LAPAROSCOPIC; plain text: creation of colostomy using small incisions;  Surgeon: Emerson Canada MD;  Location: [facility] OR [facility];  Service: General;  Laterality: N/A;    CYSTOSCOPY    ENDOSCOPY    Procedure: ESOPHAGOGASTRODUODENOSCOPY WITH BIOPSIES;  Surgeon: Rafael Hernandez MD;  Location: [facility] ENDOSCOPY;  Service: Gastroenterology;  Laterality: N/A;  HIATAL HERNIA, REFLUX ESOPHAGITIS    FEMUR SURGERY    Shravan placed    INCISION AND DRAINAGE ABSCESS    Procedure: INCISION AND DRAINAGE ABSCESS; plain text: incision and drain pus from buttocks wound;  Surgeon: Emerson Canada MD;  Location: [facility] OR [facility];  Service: General;  Laterality: N/A;    KNEE SURGERY    OTHER SURGICAL HISTORY    venous port, REMOVED    PORTACATH PLACEMENT    TIBIAL PLATEAU OPEN REDUCTION INTERNAL FIXATION    Procedure: TIBIAL PLATEAU OPEN REDUCTION INTERNAL FIXATION;  Surgeon: Hugo Kline MD;  Location: [facility] MAIN OR; "  Service: Orthopedics;  Laterality: Left;    TONSILLECTOMY AND ADENOIDECTOMY      Family History   Problem Relation Age of Onset    Coronary artery disease Mother     Hypertension Mother     Diabetes type II Mother     Asthma Father     Diabetes type II Sister     Cancer Sister     Malig Hyperthermia Neg Hx     Colon cancer Neg Hx      Social History     Tobacco Use    Smoking status: Former     Current packs/day: 0.00     Average packs/day: 1 pack/day for 12.0 years (12.0 ttl pk-yrs)     Types: Cigarettes     Start date:      Quit date:      Years since quittin.5     Passive exposure: Past    Smokeless tobacco: Never   Substance Use Topics    Alcohol use: Not Currently       Health Maintenance Due   Topic Date Due    URINE MICROALBUMIN-CREATININE RATIO (uACR)  2022    DIABETIC EYE EXAM  2024        Immunization History   Administered Date(s) Administered    COVID-19 (PFIZER) 12YRS+ (COMIRNATY) 10/22/2024    Flu Vaccine Quad PF >36MO 10/18/2016, 10/16/2017, 2019    Flu Vaccine Split Quad 2019    Fluzone  >6mos 2013    Fluzone (or Fluarix & Flulaval for VFC) >6mos 2013, 10/18/2016, 10/16/2017, 2019, 10/19/2022, 2023    Fluzone High-Dose 65+YRS 10/22/2024    Influenza Injectable Mdck Pf Quad 10/19/2022    Influenza, Unspecified 10/18/2016, 10/16/2017, 2019, 2020    PEDS-Pneumococcal Conjugate (PCV7) 2023    Pneumococcal Conjugate 20-Valent (PCV20) 2023    Pneumococcal Polysaccharide (PPSV23) 1997    Shingrix 2024, 10/22/2024    Tdap 2018    influenza Split 2019       Allergies   Allergen Reactions    Benadryl [Diphenhydramine] Itching    Proventil [Albuterol] Other (See Comments)     Mouth sores

## 2025-07-16 NOTE — ASSESSMENT & PLAN NOTE
Currently on vitamin D supplementation to 2000 units daily.  Will check labs and adjust Tx plan pending results  Orders:    Vitamin D,25-Hydroxy; Future

## 2025-07-18 ENCOUNTER — TELEPHONE (OUTPATIENT)
Dept: SURGERY | Facility: CLINIC | Age: 60
End: 2025-07-18
Payer: MEDICAID

## 2025-07-18 ENCOUNTER — OFFICE VISIT (OUTPATIENT)
Age: 60
End: 2025-07-18
Payer: MEDICAID

## 2025-07-18 DIAGNOSIS — S42.201S CLOSED FRACTURE OF PROXIMAL END OF RIGHT HUMERUS, UNSPECIFIED FRACTURE MORPHOLOGY, SEQUELA: ICD-10-CM

## 2025-07-18 DIAGNOSIS — M19.011 PRIMARY OSTEOARTHRITIS OF RIGHT SHOULDER: ICD-10-CM

## 2025-07-18 DIAGNOSIS — M25.511 RIGHT SHOULDER PAIN, UNSPECIFIED CHRONICITY: Primary | ICD-10-CM

## 2025-07-18 DIAGNOSIS — M75.101 TEAR OF RIGHT ROTATOR CUFF, UNSPECIFIED TEAR EXTENT, UNSPECIFIED WHETHER TRAUMATIC: ICD-10-CM

## 2025-07-18 LAB — BACTERIA SPEC AEROBE CULT: NO GROWTH

## 2025-07-18 RX ADMIN — LIDOCAINE HYDROCHLORIDE 5 ML: 10 INJECTION, SOLUTION EPIDURAL; INFILTRATION; INTRACAUDAL; PERINEURAL at 12:02

## 2025-07-18 RX ADMIN — TRIAMCINOLONE ACETONIDE 40 MG: 40 INJECTION, SUSPENSION INTRA-ARTICULAR; INTRAMUSCULAR at 12:02

## 2025-07-18 NOTE — TELEPHONE ENCOUNTER
ANDREW CALLED FROM [facility].  SHE SAID HER  ASKED FOR HER TO LEAVE THE MESSAGE FOR TAD.  I TOLD HER TAD IS NOT HERE TODAY, BUT I WILL LEAVE MESSAGE FOR HER.    SHE WANTS TO VERIFY IF FAX FROM 07/14/25 WAS RECEIVED FOR PATIENT'S OSTOMY SUPPLIES.  I TOLD HER I DON'T SEE IT SCANNED IN PATIENT'S CHART, HOWEVER MA HAVE IT.      ANDREW WILL FAX AGAIN TODAY.    CB#[phone]  FAX#[phone]

## 2025-07-18 NOTE — PROGRESS NOTES
Chief Complaint  Follow-up and Pain of the Right Shoulder    Subjective      Preston Wallis presents to [facility] for evaluation of the right shoulder.       History of Present Illness  The patient is a 60-year-old male who presents today to follow up on his right shoulder pain, last seen by Dr. Aldrich in 2023 for the same issue.He reports persistent pain in his right shoulder, which he believes is due to a previous humerus fracture that was treated nonoperatively. He has not experienced any recent injuries or strains. The onset of the pain was gradual and has been present for approximately 5 years. He has not received any steroid injections for his shoulder for a long time. He is currently undergoing physical therapy at home, which focuses on improving his ability to stand as he uses a wheelchair full time. He experiences significant pain when standing up and also reports discomfort at night, which disrupts his sleep. He does not use diclofenac cream for relief.         Allergies   Allergen Reactions    Benadryl [Diphenhydramine] Itching    Proventil [Albuterol] Other (See Comments)     Mouth sores         Objective     Vital Signs:   There were no vitals filed for this visit.  There is no height or weight on file to calculate BMI.    I reviewed the patient's chief complaint, history of present illness, review of systems, past medical history, surgical history, family history, social history, medications, and allergy list.     Ortho Exam  Right Shoulder: mild tenderness to palpation over muscular biceps, no evidence of skin discoloration, atrophy, or swelling. Forward elevation to 110 degrees, internal rotation to side hip.  Pain with cross arm abduction. Neurovascularly intact.  Sensation intact to light touch of the medial, radial and ulnar nerve.  Demonstrates intact active elbow ROM. Active pronation and supination of hand, thumb opposition intact. Demonstrates intact active wrist  ROM.  Radial Pulse Palpable.         Physical Exam  Musculoskeletal:  Right shoulder: Pronation and supination are normal. Thumb opposition is intact. Pulses are normal.        Large Joint: R subacromial bursa  Date/Time: 7/18/2025 12:02 PM  Consent given by: patient  Site marked: site marked  Timeout: Immediately prior to procedure a time out was called to verify the correct patient, procedure, equipment, support staff and site/side marked as required   Procedure Details  Location: shoulder - R subacromial bursa  Preparation: Patient was prepped and draped in the usual sterile fashion  Needle gauge: 21 G.  Medications administered: 5 mL lidocaine PF 1% 1 %; 40 mg triamcinolone acetonide 40 MG/ML  Patient tolerance: patient tolerated the procedure well with no immediate complications      This injection documentation was Scribed for CON Fajardo by Thu Martins MA.  07/18/25   12:06 EDT     Imaging Results (Most Recent)       None               Results  Labs   - Blood sugar: 77         Assessment and Plan   Diagnoses and all orders for this visit:    1. Right shoulder pain, unspecified chronicity (Primary)    2. Closed fracture of proximal end of right humerus, unspecified fracture morphology, sequela    3. Primary osteoarthritis of right shoulder    4. Tear of right rotator cuff, unspecified tear extent, unspecified whether traumatic    Other orders  -     Large Joint: R subacromial bursa         Assessment & Plan    Patient presents to office visit for follow up of right shoulder pain, last seen by Dr. Aldrich in 2023 for the same issue. Patient has a complex medical history and multiple comorbidities. At this time he is established with home health and PT comes to his house. PT is working with pt on standing. Due to history of humerus fracture that was treated conservatively, limited mobility, and recent PT visits working on ROM/strength focus will be placed on potential exacerbation of right  shoulder pain from increase use. Educated patient the right shoulder pain may be due to muscle strain from increased activity during physical therapy sessions. He was advised to apply ice to the affected area for a week. If there is no improvement, he can try using a heating pad. Over-the-counter diclofenac cream was recommended for topical application. Rx NSAID was not recommended at this time due to known Romero's Esophagus and upcoming EGD for evaluation. During today's office visit a steroid injection in the shoulder was offered as an option to alleviate the pain. The importance of stretching and strengthening the muscles was emphasized, and he was advised to continue physical therapy. Educated on potential referral to pain management for nerve ablation as suggested from Dr. Aldrich in 2023 if relief is not provided to pt as he is not a surgical candidate due to complex medical history.     Follow-up: 08/29/2025        Tobacco Use: Medium Risk (7/18/2025)    Patient History     Smoking Tobacco Use: Former     Smokeless Tobacco Use: Never     Passive Exposure: Past     Patient reports they have a history of tobacco use; encouraged continued tobacco cessation for further health benefits.            Follow Up   No follow-ups on file.  There are no Patient Instructions on file for this visit.    Patient was given instructions and counseling regarding his condition or for health maintenance advice. Please see specific information pulled into the AVS if appropriate.       Patient or patient representative verbalized consent for the use of Ambient Listening during the visit with  CON Fajardo for chart documentation. 7/20/2025  20:18 EDT    CON Fajardo   07/18/2025  11:59 EDT    Dictated Utilizing Dragon Dictation. Please note that portions of this note were completed with a voice recognition program. Part of this note may be an electronic transcription/translation of spoken language to printed text  using the Dragon Dictation System.

## 2025-07-20 RX ORDER — TRIAMCINOLONE ACETONIDE 40 MG/ML
40 INJECTION, SUSPENSION INTRA-ARTICULAR; INTRAMUSCULAR
Status: COMPLETED | OUTPATIENT
Start: 2025-07-18 | End: 2025-07-18

## 2025-07-20 RX ORDER — LIDOCAINE HYDROCHLORIDE 10 MG/ML
5 INJECTION, SOLUTION EPIDURAL; INFILTRATION; INTRACAUDAL; PERINEURAL
Status: COMPLETED | OUTPATIENT
Start: 2025-07-18 | End: 2025-07-18

## 2025-07-21 NOTE — TELEPHONE ENCOUNTER
ANDREW CALLED FROM [facility] TO F/U ON CALL FROM FRIDAY.  I TOLD HER THE ORDER WAS FAXED TO [facility] ON FRIDAY TO NUMBER GIVEN.      SHE DOESN'T SEE IT, AND ASKED FOR IT TO BE FAXED AGAIN.  FAX [phone].    #[phone]

## 2025-07-23 ENCOUNTER — PATIENT OUTREACH (OUTPATIENT)
Dept: CASE MANAGEMENT | Facility: OTHER | Age: 60
End: 2025-07-23
Payer: MEDICAID

## 2025-07-23 DIAGNOSIS — G62.9 PERIPHERAL POLYNEUROPATHY: ICD-10-CM

## 2025-07-23 DIAGNOSIS — E11.65 UNCONTROLLED TYPE 2 DIABETES MELLITUS WITH HYPERGLYCEMIA, WITH LONG-TERM CURRENT USE OF INSULIN: Primary | ICD-10-CM

## 2025-07-23 DIAGNOSIS — Z79.4 UNCONTROLLED TYPE 2 DIABETES MELLITUS WITH HYPERGLYCEMIA, WITH LONG-TERM CURRENT USE OF INSULIN: Primary | ICD-10-CM

## 2025-07-23 NOTE — OUTREACH NOTE
AMBULATORY CASE MANAGEMENT NOTE    Names and Relationships of Patient/Support Persons: Contact: Kami Wallis; Relationship: Emergency Contact -     Elizabeth called to state that they never got the Rx for the Lyrica.  Reviewed medication and was sent to the wrong pharmacy.   Will call pharmacy to see if it has been filled and it will need to be resent to [facility].  [facility] has not filled, will have her resend.  Will pend for MD to resend    Tara GOMEZ  Ambulatory Case Management    7/23/2025, 13:17 EDT

## 2025-07-23 NOTE — TELEPHONE ENCOUNTER
This prescription was sent to the wrong pharmacy.  I called to see if they had filled, they had not.  Ok to send to [facility].

## 2025-07-24 RX ORDER — PREGABALIN 75 MG/1
75 CAPSULE ORAL 2 TIMES DAILY
Qty: 60 CAPSULE | Refills: 2 | Status: SHIPPED | OUTPATIENT
Start: 2025-07-24

## 2025-07-28 ENCOUNTER — PATIENT OUTREACH (OUTPATIENT)
Dept: CASE MANAGEMENT | Facility: OTHER | Age: 60
End: 2025-07-28
Payer: MEDICAID

## 2025-07-28 DIAGNOSIS — J44.1 COPD EXACERBATION: ICD-10-CM

## 2025-07-28 DIAGNOSIS — Z87.01 HISTORY OF PSEUDOMONAS PNEUMONIA: Primary | ICD-10-CM

## 2025-07-28 DIAGNOSIS — J47.9 BRONCHIECTASIS WITHOUT COMPLICATION: ICD-10-CM

## 2025-07-28 DIAGNOSIS — R06.09 CHRONIC DYSPNEA: Primary | ICD-10-CM

## 2025-07-28 DIAGNOSIS — J44.1 CHRONIC OBSTRUCTIVE PULMONARY DISEASE WITH ACUTE EXACERBATION: Primary | ICD-10-CM

## 2025-07-28 DIAGNOSIS — J44.9 CHRONIC OBSTRUCTIVE PULMONARY DISEASE, UNSPECIFIED COPD TYPE: ICD-10-CM

## 2025-07-28 DIAGNOSIS — R82.90 CLOUDY URINE: ICD-10-CM

## 2025-07-28 RX ORDER — TOBRAMYCIN INHALATION SOLUTION 300 MG/5ML
300 INHALANT RESPIRATORY (INHALATION)
Qty: 300 ML | Refills: 6 | Status: ON HOLD | OUTPATIENT
Start: 2025-07-28

## 2025-07-28 RX ORDER — GUAIFENESIN 600 MG/1
1200 TABLET, EXTENDED RELEASE ORAL 2 TIMES DAILY
COMMUNITY
End: 2025-07-28 | Stop reason: SDUPTHER

## 2025-07-28 RX ORDER — GUAIFENESIN 600 MG/1
1200 TABLET, EXTENDED RELEASE ORAL 2 TIMES DAILY
Qty: 120 TABLET | Refills: 2 | Status: ON HOLD | OUTPATIENT
Start: 2025-07-28

## 2025-07-28 NOTE — OUTREACH NOTE
[facility] Interim Update    Gómez does not have a indwelling catheter, Elizabeth can do the in and out cath.  Offered appointment but he has to be seen by wound care on Wednesday.  Will reach out to pulmonology to see if they could possible see him the same day.

## 2025-07-28 NOTE — OUTREACH NOTE
AMBULATORY CASE MANAGEMENT NOTE    Names and Relationships of Patient/Support Persons: Contact: Kami Wallis; Relationship: Emergency Contact -     Elizabeth called stating that Gómez is having increased shortness of air, but his oxygen saturations are still good in the 90's.  Just thick mucous that he cannot clear.   He is not on Mucinex at the time, will pend Rx in a separate encounter.    He is due to start his Tobramycin nebs at the beginning of August.  Pulmonology is sending in Rx.   She is also reporting that his pee is cloudy/foamy.   Can we collect a sputum culture and urine / culture?  - he does have a cath in place and I don't believe Elizabeth would be able to pull urine from a port,       Tara GOMEZ  Ambulatory Case Management    7/28/2025, 09:07 EDT

## 2025-07-29 ENCOUNTER — LAB (OUTPATIENT)
Dept: LAB | Facility: HOSPITAL | Age: 60
End: 2025-07-29
Payer: MEDICAID

## 2025-07-29 ENCOUNTER — TELEPHONE (OUTPATIENT)
Dept: FAMILY MEDICINE CLINIC | Age: 60
End: 2025-07-29
Payer: MEDICAID

## 2025-07-29 DIAGNOSIS — Z79.4 TYPE 2 DIABETES MELLITUS WITH DIABETIC PERIPHERAL ANGIOPATHY WITHOUT GANGRENE, WITH LONG-TERM CURRENT USE OF INSULIN: Chronic | ICD-10-CM

## 2025-07-29 DIAGNOSIS — E11.51 TYPE 2 DIABETES MELLITUS WITH DIABETIC PERIPHERAL ANGIOPATHY WITHOUT GANGRENE, WITH LONG-TERM CURRENT USE OF INSULIN: Chronic | ICD-10-CM

## 2025-07-29 DIAGNOSIS — R06.09 CHRONIC DYSPNEA: ICD-10-CM

## 2025-07-29 DIAGNOSIS — R82.90 CLOUDY URINE: ICD-10-CM

## 2025-07-29 DIAGNOSIS — J44.1 COPD EXACERBATION: ICD-10-CM

## 2025-07-29 LAB
ALBUMIN UR-MCNC: 127.2 MG/DL
CREAT UR-MCNC: 33.6 MG/DL
MICROALBUMIN/CREAT UR: 3785.7 MG/G (ref 0–29)

## 2025-07-29 PROCEDURE — 87186 SC STD MICRODIL/AGAR DIL: CPT

## 2025-07-29 PROCEDURE — 82043 UR ALBUMIN QUANTITATIVE: CPT

## 2025-07-29 PROCEDURE — 87077 CULTURE AEROBIC IDENTIFY: CPT

## 2025-07-29 PROCEDURE — 87205 SMEAR GRAM STAIN: CPT

## 2025-07-29 PROCEDURE — 87086 URINE CULTURE/COLONY COUNT: CPT

## 2025-07-29 PROCEDURE — 87070 CULTURE OTHR SPECIMN AEROBIC: CPT

## 2025-07-29 PROCEDURE — 82570 ASSAY OF URINE CREATININE: CPT

## 2025-07-29 NOTE — TELEPHONE ENCOUNTER
PT mari Clarke pt is soa oxygen up to 3 liters , elevated heart rate she called EMS and he is going to [facility]

## 2025-07-31 ENCOUNTER — TELEPHONE (OUTPATIENT)
Dept: FAMILY MEDICINE CLINIC | Age: 60
End: 2025-07-31
Payer: MEDICAID

## 2025-07-31 LAB
BACTERIA SPEC AEROBE CULT: ABNORMAL
BACTERIA SPEC RESP CULT: NORMAL
GRAM STN SPEC: NORMAL

## 2025-07-31 NOTE — TELEPHONE ENCOUNTER
1st attempt - pt wife Elizabeth informed of overdue labs ordered by pcp on 7/16/25. Pt is currently admitted to [facility].     
Continue Regimen: Clobetasol 0.05% cream BID PRN
Render In Strict Bullet Format?: No
Detail Level: Zone

## 2025-08-04 ENCOUNTER — APPOINTMENT (OUTPATIENT)
Dept: GENERAL RADIOLOGY | Facility: HOSPITAL | Age: 60
End: 2025-08-04
Payer: MEDICAID

## 2025-08-04 ENCOUNTER — READMISSION MANAGEMENT (OUTPATIENT)
Dept: CALL CENTER | Facility: HOSPITAL | Age: 60
End: 2025-08-04
Payer: MEDICAID

## 2025-08-04 ENCOUNTER — HOSPITAL ENCOUNTER (INPATIENT)
Facility: HOSPITAL | Age: 60
LOS: 16 days | Discharge: HOME | End: 2025-08-20
Attending: STUDENT IN AN ORGANIZED HEALTH CARE EDUCATION/TRAINING PROGRAM | Admitting: STUDENT IN AN ORGANIZED HEALTH CARE EDUCATION/TRAINING PROGRAM
Payer: MEDICAID

## 2025-08-04 PROBLEM — N17.9 AKI (ACUTE KIDNEY INJURY): Status: ACTIVE | Noted: 2025-08-04

## 2025-08-04 PROBLEM — N18.30 CKD (CHRONIC KIDNEY DISEASE) STAGE 3, GFR 30-59 ML/MIN: Status: ACTIVE | Noted: 2025-08-04

## 2025-08-05 ENCOUNTER — HOSPITAL ENCOUNTER (OUTPATIENT)
Dept: CT IMAGING | Facility: HOSPITAL | Age: 60
Discharge: HOME | End: 2025-08-05
Payer: MEDICAID

## 2025-08-05 ENCOUNTER — APPOINTMENT (OUTPATIENT)
Dept: GENERAL RADIOLOGY | Facility: HOSPITAL | Age: 60
End: 2025-08-05
Payer: MEDICAID

## 2025-08-05 ENCOUNTER — APPOINTMENT (OUTPATIENT)
Dept: CT IMAGING | Facility: HOSPITAL | Age: 60
End: 2025-08-05
Payer: MEDICAID

## 2025-08-06 ENCOUNTER — APPOINTMENT (OUTPATIENT)
Dept: CARDIOLOGY | Facility: HOSPITAL | Age: 60
End: 2025-08-06
Payer: MEDICAID

## 2025-08-09 ENCOUNTER — APPOINTMENT (OUTPATIENT)
Dept: GENERAL RADIOLOGY | Facility: HOSPITAL | Age: 60
End: 2025-08-09
Payer: MEDICAID

## 2025-08-11 ENCOUNTER — APPOINTMENT (OUTPATIENT)
Dept: GENERAL RADIOLOGY | Facility: HOSPITAL | Age: 60
End: 2025-08-11
Payer: MEDICAID

## 2025-08-11 ENCOUNTER — PATIENT OUTREACH (OUTPATIENT)
Dept: CASE MANAGEMENT | Facility: OTHER | Age: 60
End: 2025-08-11
Payer: MEDICAID

## 2025-08-11 DIAGNOSIS — J44.1 CHRONIC OBSTRUCTIVE PULMONARY DISEASE WITH ACUTE EXACERBATION: Primary | ICD-10-CM

## 2025-08-11 DIAGNOSIS — E11.65 UNCONTROLLED TYPE 2 DIABETES MELLITUS WITH HYPERGLYCEMIA, WITH LONG-TERM CURRENT USE OF INSULIN: ICD-10-CM

## 2025-08-11 DIAGNOSIS — Z79.4 UNCONTROLLED TYPE 2 DIABETES MELLITUS WITH HYPERGLYCEMIA, WITH LONG-TERM CURRENT USE OF INSULIN: ICD-10-CM

## 2025-08-12 ENCOUNTER — APPOINTMENT (OUTPATIENT)
Facility: HOSPITAL | Age: 60
End: 2025-08-12
Payer: MEDICAID

## 2025-08-18 PROBLEM — I50.33 ACUTE ON CHRONIC DIASTOLIC CHF (CONGESTIVE HEART FAILURE): Status: ACTIVE | Noted: 2025-08-04

## 2025-08-19 PROBLEM — I50.33 ACUTE ON CHRONIC HEART FAILURE WITH PRESERVED EJECTION FRACTION (HFPEF): Status: ACTIVE | Noted: 2025-08-19

## 2025-08-20 ENCOUNTER — READMISSION MANAGEMENT (OUTPATIENT)
Dept: CALL CENTER | Facility: HOSPITAL | Age: 60
End: 2025-08-20
Payer: MEDICAID

## 2025-08-20 PROBLEM — N39.0 UTI DUE TO KLEBSIELLA SPECIES: Status: ACTIVE | Noted: 2025-08-20

## 2025-08-20 PROBLEM — B96.89 UTI DUE TO KLEBSIELLA SPECIES: Status: ACTIVE | Noted: 2025-08-20

## 2025-08-20 PROBLEM — R65.21 SHOCK, SEPTIC: Status: ACTIVE | Noted: 2023-12-06

## 2025-08-21 ENCOUNTER — TRANSITIONAL CARE MANAGEMENT TELEPHONE ENCOUNTER (OUTPATIENT)
Dept: CALL CENTER | Facility: HOSPITAL | Age: 60
End: 2025-08-21
Payer: MEDICAID

## 2025-08-26 ENCOUNTER — TELEPHONE (OUTPATIENT)
Dept: FAMILY MEDICINE CLINIC | Age: 60
End: 2025-08-26
Payer: MEDICAID

## 2025-08-27 ENCOUNTER — PATIENT OUTREACH (OUTPATIENT)
Dept: CASE MANAGEMENT | Facility: OTHER | Age: 60
End: 2025-08-27
Payer: MEDICAID

## 2025-08-27 DIAGNOSIS — Z79.4 UNCONTROLLED TYPE 2 DIABETES MELLITUS WITH HYPERGLYCEMIA, WITH LONG-TERM CURRENT USE OF INSULIN: ICD-10-CM

## 2025-08-27 DIAGNOSIS — J44.1 CHRONIC OBSTRUCTIVE PULMONARY DISEASE WITH ACUTE EXACERBATION: ICD-10-CM

## 2025-08-27 DIAGNOSIS — E11.65 UNCONTROLLED TYPE 2 DIABETES MELLITUS WITH HYPERGLYCEMIA, WITH LONG-TERM CURRENT USE OF INSULIN: ICD-10-CM

## 2025-08-27 DIAGNOSIS — N18.4 CHRONIC KIDNEY DISEASE, STAGE IV (SEVERE): Primary | ICD-10-CM

## (undated) DEVICE — SOLIDIFIER LIQLOC PLS 1500CC BT

## (undated) DEVICE — DEV OPN LIGASURE SM/JAW 28D 16.5MM 18.8CM 1P/U

## (undated) DEVICE — AVANTI + 5F STD W/GW: Brand: AVANTI

## (undated) DEVICE — SOL IRR NACL 0.9PCT BO 1000ML

## (undated) DEVICE — LAPAROSCOPIC SCISSORS: Brand: EPIX LAPAROSCOPIC SCISSORS

## (undated) DEVICE — BNDG ESMARK STRL 6INX12FT LF

## (undated) DEVICE — ELECTRD BLD EZ CLN MOD XLNG 2.75IN

## (undated) DEVICE — SOL IRRG H2O PL/BG 1000ML STRL

## (undated) DEVICE — THE STERILE LIGHT HANDLE COVER IS USED WITH STERIS SURGICAL LIGHTING AND VISUALIZATION SYSTEMS.

## (undated) DEVICE — SUT VIC 3/0 SH 27IN J416H

## (undated) DEVICE — SYR LL TP 10ML STRL

## (undated) DEVICE — SPNG GZ WOVN 4X4IN 12PLY 10/BX STRL

## (undated) DEVICE — INTRO CHECKFLOW W/RAABE MOD .038 6F55CM

## (undated) DEVICE — INTENDED FOR TISSUE SEPARATION, AND OTHER PROCEDURES THAT REQUIRE A SHARP SURGICAL BLADE TO PUNCTURE OR CUT.: Brand: BARD-PARKER ® CARBON RIB-BACK BLADES

## (undated) DEVICE — TBG PENCL TELESCP MEGADYNE SMOKE EVAC 10FT

## (undated) DEVICE — SOL IRR NACL 0.9PCT 1000ML

## (undated) DEVICE — SYR LUER SLPTP 50ML

## (undated) DEVICE — SWAB CULT COL W AMIES GEL

## (undated) DEVICE — UNDERCAST PADDING: Brand: DEROYAL

## (undated) DEVICE — LAPAROVUE VISIBILITY SYSTEM LAPAROSCOPIC SOLUTIONS: Brand: LAPAROVUE

## (undated) DEVICE — PROTECT HEEL HEELMEDIX STD

## (undated) DEVICE — ANTIBACTERIAL UNDYED BRAIDED (POLYGLACTIN 910), SYNTHETIC ABSORBABLE SUTURE: Brand: COATED VICRYL

## (undated) DEVICE — 2, DISPOSABLE SUCTION/IRRIGATOR WITHOUT DISPOSABLE TIP: Brand: STRYKEFLOW

## (undated) DEVICE — SUT VIC 3/0 CTI 36IN J944H

## (undated) DEVICE — DEV ATOMIZATION MUCOSAL/NASALTRACH

## (undated) DEVICE — CALIBRATED DRILL BIT , AO: Brand: AXSOS

## (undated) DEVICE — PAD,ABDOMINAL,8"X10",ST,LF: Brand: MEDLINE

## (undated) DEVICE — LINER SURG CANSTR SXN S/RIGD 1500CC

## (undated) DEVICE — GW STARTER JB STR .035 15X180CM

## (undated) DEVICE — OPTIFOAM GENTLE SA, POSTOP, 4X8: Brand: MEDLINE

## (undated) DEVICE — SUT PDS2 0 CT1 27IN Z340H MF VIL

## (undated) DEVICE — DRSNG WND GZ PAD BORDERED 4X8IN STRL

## (undated) DEVICE — Device

## (undated) DEVICE — CATH TEMPO 5F UF 65CM 5SH: Brand: TEMPO

## (undated) DEVICE — BNDG,ELSTC,MATRIX,STRL,4"X5YD,LF,HOOK&LP: Brand: MEDLINE

## (undated) DEVICE — TROCAR: Brand: KII® SLEEVE

## (undated) DEVICE — GENERAL LAPAROSCOPY-LF: Brand: MEDLINE INDUSTRIES, INC.

## (undated) DEVICE — SLV SCD KN/LEN ADJ EXPRSS BLENDED MD 1P/U

## (undated) DEVICE — BLCK/BITE BLOX WO/DENTL/RIM W/STRAP 54F

## (undated) DEVICE — SINGLE USE BIOPSY VALVE MAJ-210: Brand: SINGLE USE BIOPSY VALVE (STERILE)

## (undated) DEVICE — CONTAINER,SPEC,PNEUM TUBE,4OZ,STRL PATH: Brand: MEDLINE

## (undated) DEVICE — SKIN PREP TRAY W/CHG: Brand: MEDLINE INDUSTRIES, INC.

## (undated) DEVICE — PENCL SMOKE/EVAC MEGADYNE TELESCP 10FT

## (undated) DEVICE — DRSNG WND BORDR/ADHS NONADHR/GZ LF 4X14IN STRL

## (undated) DEVICE — PROXIMATE RH ROTATING HEAD SKIN STAPLERS (35 WIDE) CONTAINS 35 STAINLESS STEEL STAPLES: Brand: PROXIMATE

## (undated) DEVICE — SUT ETHIB 1 CT1 30IN  X425H

## (undated) DEVICE — INTENT TO BE USED WITH SUTURE MATERIAL FOR TISSUE CLOSURE: Brand: RICHARD-ALLAN® NEEDLE 1/2 CIRCLE TAPER

## (undated) DEVICE — 1LYRTR 16FR10ML100%SIL UMS SNP: Brand: MEDLINE INDUSTRIES, INC.

## (undated) DEVICE — SUT GUT CHRM 2/0 SH 27IN G123H

## (undated) DEVICE — APPL CHLORAPREP HI/LITE 26ML ORNG

## (undated) DEVICE — DRP C/ARMOR

## (undated) DEVICE — CONN JET HYDRA H20 AUXILIARY DISP

## (undated) DEVICE — STERILE POLYISOPRENE POWDER-FREE SURGICAL GLOVES WITH EMOLLIENT COATING: Brand: PROTEXIS

## (undated) DEVICE — SYS CLS SKIN PREMIERPRO EXOFINFUSION 22CM

## (undated) DEVICE — LAPAROSCOPIC TROCAR SLEEVE/SINGLE USE: Brand: KII® OPTICAL ACCESS SYSTEM

## (undated) DEVICE — GW PERIPH VASSALLO/GT FLOP 0.14IN 300CM STR

## (undated) DEVICE — 3M™ IOBAN™ 2 ANTIMICROBIAL INCISE DRAPE 6650EZ: Brand: IOBAN™ 2

## (undated) DEVICE — HARMONIC 700 SHEARS, ADVANCED HEMOSTASIS: Brand: HARMONIC

## (undated) DEVICE — SI AVANTI+ 6F STD W/GW  NO OBT: Brand: AVANTI

## (undated) DEVICE — RADIFOCUS GLIDECATH: Brand: GLIDECATH

## (undated) DEVICE — MINOR-LF: Brand: MEDLINE INDUSTRIES, INC.

## (undated) DEVICE — SOL ISO/ALC 70PCT 4OZ

## (undated) DEVICE — SPNG LAP 18X18IN LF STRL PK/5

## (undated) DEVICE — NAVICROSS SUPPORT CATHETER: Brand: NAVICROSS

## (undated) DEVICE — DRAPE,REIN 53X77,STERILE: Brand: MEDLINE

## (undated) DEVICE — GLV SURG SENSICARE PI ORTHO SZ6.5 LF STRL

## (undated) DEVICE — K-WIRE DRILL TIP
Type: IMPLANTABLE DEVICE | Site: LEG | Status: NON-FUNCTIONAL
Brand: AXSOS
Removed: 2023-12-22

## (undated) DEVICE — Device: Brand: SENSURA

## (undated) DEVICE — BRIEF KNIT SEAMLSS PREM 70IN 3XL PK/2

## (undated) DEVICE — SINGLE-USE BIOPSY FORCEPS: Brand: RADIAL JAW 4

## (undated) DEVICE — PK ORTHO MAJ 40

## (undated) DEVICE — DRP C/ARM 41X74IN

## (undated) DEVICE — GLV SURG SIGNATURE ESSENTIAL PF LTX SZ8

## (undated) DEVICE — DEV CLS VASC MYNXCONTROL 6FTO7F

## (undated) DEVICE — ROD OS LP SURFIT 2 1/2IN

## (undated) DEVICE — DISPOSABLE TOURNIQUET CUFF SINGLE BLADDER, SINGLE PORT AND QUICK CONNECT CONNECTOR: Brand: COLOR CUFF

## (undated) DEVICE — TROCARS: Brand: KII® BALLOON BLUNT TIP SYSTEM

## (undated) DEVICE — TRAP,MUCUS SPECIMEN,40CC: Brand: MEDLINE

## (undated) DEVICE — SINGLE USE SUCTION VALVE MAJ-209: Brand: SINGLE USE SUCTION VALVE (STERILE)

## (undated) DEVICE — T-DRAPE,EXTREMITY,STERILE: Brand: MEDLINE

## (undated) DEVICE — DEFENDO AIR WATER SUCTION AND BIOPSY VALVE KIT FOR  OLYMPUS: Brand: DEFENDO AIR/WATER/SUCTION AND BIOPSY VALVE

## (undated) DEVICE — BALN EVERCROSS OTW .035 5F 5X40 135

## (undated) DEVICE — STERILE POLYISOPRENE POWDER-FREE SURGICAL GLOVES: Brand: PROTEXIS

## (undated) DEVICE — BNDG,ELSTC,MATRIX,STRL,6"X5YD,LF,HOOK&LP: Brand: MEDLINE

## (undated) DEVICE — FRAME FIXATOR DIAM.3.0MM, AO FITTING: Brand: AXSOS

## (undated) DEVICE — INTENDED FOR TISSUE SEPARATION, AND OTHER PROCEDURES THAT REQUIRE A SHARP SURGICAL BLADE TO PUNCTURE OR CUT.: Brand: BARD-PARKER ® STAINLESS STEEL BLADES

## (undated) DEVICE — TRAP FLD MINIVAC MEGADYNE 100ML

## (undated) DEVICE — GLV SURG PREMIERPRO ORTHO LTX PF SZ8 BRN

## (undated) DEVICE — GLV SURG BIOGEL LTX PF 8

## (undated) DEVICE — DRESSING,GAUZE,XEROFORM,CURAD,5"X9",ST: Brand: CURAD

## (undated) DEVICE — GOWN,SIRUS,POLYRNF,BRTHSLV,2XL,18/CS: Brand: MEDLINE

## (undated) DEVICE — ST ACC MICROPUNCTURE STFF .018 ECHO/PLAT/TP 4F/10CM 21G/7CM